# Patient Record
Sex: MALE | Race: OTHER | HISPANIC OR LATINO | URBAN - METROPOLITAN AREA
[De-identification: names, ages, dates, MRNs, and addresses within clinical notes are randomized per-mention and may not be internally consistent; named-entity substitution may affect disease eponyms.]

---

## 2020-02-24 ENCOUNTER — OUTPATIENT (OUTPATIENT)
Dept: OUTPATIENT SERVICES | Facility: HOSPITAL | Age: 50
LOS: 1 days | End: 2020-02-24
Payer: MEDICARE

## 2020-02-24 ENCOUNTER — TRANSCRIPTION ENCOUNTER (OUTPATIENT)
Age: 50
End: 2020-02-24

## 2020-02-24 VITALS
SYSTOLIC BLOOD PRESSURE: 150 MMHG | DIASTOLIC BLOOD PRESSURE: 102 MMHG | RESPIRATION RATE: 16 BRPM | HEART RATE: 83 BPM | HEIGHT: 71 IN | OXYGEN SATURATION: 98 % | WEIGHT: 216.93 LBS | TEMPERATURE: 98 F

## 2020-02-24 DIAGNOSIS — Z98.890 OTHER SPECIFIED POSTPROCEDURAL STATES: Chronic | ICD-10-CM

## 2020-02-24 DIAGNOSIS — N18.6 END STAGE RENAL DISEASE: ICD-10-CM

## 2020-02-24 DIAGNOSIS — E87.5 HYPERKALEMIA: ICD-10-CM

## 2020-02-24 DIAGNOSIS — Z01.818 ENCOUNTER FOR OTHER PREPROCEDURAL EXAMINATION: ICD-10-CM

## 2020-02-24 DIAGNOSIS — E11.9 TYPE 2 DIABETES MELLITUS WITHOUT COMPLICATIONS: ICD-10-CM

## 2020-02-24 DIAGNOSIS — I10 ESSENTIAL (PRIMARY) HYPERTENSION: ICD-10-CM

## 2020-02-24 PROCEDURE — G0463: CPT

## 2020-02-24 RX ORDER — POVIDONE-IODINE 5 %
1 AEROSOL (ML) TOPICAL ONCE
Refills: 0 | Status: COMPLETED | OUTPATIENT
Start: 2020-02-25 | End: 2020-02-25

## 2020-02-24 NOTE — H&P PST ADULT - NEGATIVE GENERAL GENITOURINARY SYMPTOMS
no dysuria/no flank pain L/end stage renal disease, pt on HD via righ chest permacath from 2/2019/no hematuria/no renal colic/no flank pain R/no urinary hesitancy/no nocturia/no urine discoloration/no gas in urine/no incontinence/normal urinary frequency

## 2020-02-24 NOTE — H&P PST ADULT - NEGATIVE OPHTHALMOLOGIC SYMPTOMS
no lacrimation L/no photophobia/no diplopia/no blurred vision L/no lacrimation R/no blurred vision R

## 2020-02-24 NOTE — H&P PST ADULT - HISTORY OF PRESENT ILLNESS
49 years old male presented to PST for evaluation before surgery, pt was diagnosed with end stage renal disease and is scheduled for left arm arteriovenous creation on 2/25/2020.

## 2020-02-24 NOTE — H&P PST ADULT - VENOUS THROMBOEMBOLISM CURRENT STATUS
(2) central venous access/(1) other risk factor (includes escalating BMI, pack-years of smoking, diabetes requiring insulin, chemotherapy, female gender and length of surgery)

## 2020-02-24 NOTE — H&P PST ADULT - GASTROINTESTINAL DETAILS
soft/no distention/no masses palpable/nontender/no rebound tenderness/bowel sounds normal/no bruit/normal

## 2020-02-24 NOTE — H&P PST ADULT - NEGATIVE GASTROINTESTINAL SYMPTOMS
no change in bowel habits/no flatulence/no abdominal pain/no nausea/no diarrhea/no constipation/no vomiting

## 2020-02-24 NOTE — H&P PST ADULT - NSICDXFAMILYHX_GEN_ALL_CORE_FT
FAMILY HISTORY:  FH: hypertension, mother, father- alive  FH: type 2 diabetes, mother, father- alive

## 2020-02-24 NOTE — H&P PST ADULT - NEGATIVE NEUROLOGICAL SYMPTOMS
no vertigo/no loss of sensation/no difficulty walking/no confusion/no facial palsy/no syncope/no tremors/no loss of consciousness/no hemiparesis/no paresthesias/no transient paralysis/no weakness/no generalized seizures/no focal seizures/no headache

## 2020-02-24 NOTE — H&P PST ADULT - MUSCULOSKELETAL
details… detailed exam no joint warmth/no calf tenderness/decreased ROM/no joint erythema/no joint swelling

## 2020-02-24 NOTE — H&P PST ADULT - NSICDXPASTMEDICALHX_GEN_ALL_CORE_FT
PAST MEDICAL HISTORY:  Anemia     Bone spur right shoulder- hx of - sx done    End stage renal disease started HD 2/2019 T, Th, Sat via right chest permacath    History of hyperkalemia before HD- K-6.2 - to repeat in am of sx, pt had HD today    Hypertension     Injury of right wrist hx of at age 14    Type 2 diabetes mellitus

## 2020-02-24 NOTE — H&P PST ADULT - DOES PATIENT HAVE ADVANCE DIRECTIVE
No/in case of emergency call patient 's girl friend Sofie [phone] to make medical decisions , HCP form given to pt

## 2020-02-24 NOTE — H&P PST ADULT - NEGATIVE CARDIOVASCULAR SYMPTOMS
no paroxysmal nocturnal dyspnea/no claudication/no palpitations/no chest pain/no dyspnea on exertion/no orthopnea/no peripheral edema

## 2020-02-24 NOTE — H&P PST ADULT - NSICDXPROBLEM_GEN_ALL_CORE_FT
PROBLEM DIAGNOSES  Problem: Hyperkalemia  Assessment and Plan: K- before HD- K-6.2 - to repeat in am of sx, pt had HD today- Anesthesia aware    Problem: Hypertension  Assessment and Plan: Continue antihypertensive meds and take with sips of water on day of surgery.  Cleared by PCP. Follow-up with PCP postop for management.     Problem: Type 2 diabetes mellitus  Assessment and Plan: Continue antidiabetic meds and hold on day of surgery. Perioperative glucose monitoring and cover as needed. Follow-up with PCP for diabetic management     Problem: End stage renal disease  Assessment and Plan: Patient  is scheduled for left arm arteriovenous creation on 2/25/2020.

## 2020-02-24 NOTE — H&P PST ADULT - NSICDXPASTSURGICALHX_GEN_ALL_CORE_FT
PAST SURGICAL HISTORY:  H/O right wrist surgery tendons repair - at age 14    History of vascular access device right chest permacath - 2/2019    S/P arthroscopy of right shoulder 2010

## 2020-02-25 ENCOUNTER — OUTPATIENT (OUTPATIENT)
Dept: OUTPATIENT SERVICES | Facility: HOSPITAL | Age: 50
LOS: 1 days | End: 2020-02-25
Payer: MEDICARE

## 2020-02-25 VITALS
TEMPERATURE: 99 F | WEIGHT: 216.93 LBS | HEIGHT: 71 IN | DIASTOLIC BLOOD PRESSURE: 89 MMHG | SYSTOLIC BLOOD PRESSURE: 159 MMHG | RESPIRATION RATE: 16 BRPM | OXYGEN SATURATION: 100 % | HEART RATE: 88 BPM

## 2020-02-25 VITALS
OXYGEN SATURATION: 98 % | HEART RATE: 82 BPM | TEMPERATURE: 98 F | SYSTOLIC BLOOD PRESSURE: 126 MMHG | DIASTOLIC BLOOD PRESSURE: 72 MMHG | RESPIRATION RATE: 16 BRPM

## 2020-02-25 DIAGNOSIS — N18.6 END STAGE RENAL DISEASE: ICD-10-CM

## 2020-02-25 DIAGNOSIS — Z98.890 OTHER SPECIFIED POSTPROCEDURAL STATES: Chronic | ICD-10-CM

## 2020-02-25 LAB
ANION GAP SERPL CALC-SCNC: 9 MMOL/L — SIGNIFICANT CHANGE UP (ref 5–17)
BUN SERPL-MCNC: 48 MG/DL — HIGH (ref 7–18)
CALCIUM SERPL-MCNC: 8.9 MG/DL — SIGNIFICANT CHANGE UP (ref 8.4–10.5)
CHLORIDE SERPL-SCNC: 105 MMOL/L — SIGNIFICANT CHANGE UP (ref 96–108)
CO2 SERPL-SCNC: 24 MMOL/L — SIGNIFICANT CHANGE UP (ref 22–31)
CREAT SERPL-MCNC: 7.61 MG/DL — HIGH (ref 0.5–1.3)
GLUCOSE BLDC GLUCOMTR-MCNC: 116 MG/DL — HIGH (ref 70–99)
GLUCOSE BLDC GLUCOMTR-MCNC: 91 MG/DL — SIGNIFICANT CHANGE UP (ref 70–99)
GLUCOSE SERPL-MCNC: 94 MG/DL — SIGNIFICANT CHANGE UP (ref 70–99)
POTASSIUM SERPL-MCNC: 5.1 MMOL/L — SIGNIFICANT CHANGE UP (ref 3.5–5.3)
POTASSIUM SERPL-MCNC: 7.2 MMOL/L — CRITICAL HIGH (ref 3.5–5.3)
POTASSIUM SERPL-SCNC: 5.1 MMOL/L — SIGNIFICANT CHANGE UP (ref 3.5–5.3)
POTASSIUM SERPL-SCNC: 7.2 MMOL/L — CRITICAL HIGH (ref 3.5–5.3)
SODIUM SERPL-SCNC: 138 MMOL/L — SIGNIFICANT CHANGE UP (ref 135–145)

## 2020-02-25 PROCEDURE — 80048 BASIC METABOLIC PNL TOTAL CA: CPT

## 2020-02-25 PROCEDURE — 36819 AV FUSE UPPR ARM BASILIC: CPT

## 2020-02-25 PROCEDURE — 82962 GLUCOSE BLOOD TEST: CPT

## 2020-02-25 PROCEDURE — 36415 COLL VENOUS BLD VENIPUNCTURE: CPT

## 2020-02-25 PROCEDURE — 84132 ASSAY OF SERUM POTASSIUM: CPT

## 2020-02-25 RX ORDER — CHLORHEXIDINE GLUCONATE 213 G/1000ML
1 SOLUTION TOPICAL ONCE
Refills: 0 | Status: COMPLETED | OUTPATIENT
Start: 2020-02-25 | End: 2020-02-25

## 2020-02-25 RX ORDER — TRAMADOL HYDROCHLORIDE 50 MG/1
1 TABLET ORAL
Qty: 20 | Refills: 0
Start: 2020-02-25 | End: 2020-02-29

## 2020-02-25 RX ORDER — SODIUM CHLORIDE 9 MG/ML
1000 INJECTION INTRAMUSCULAR; INTRAVENOUS; SUBCUTANEOUS
Refills: 0 | Status: DISCONTINUED | OUTPATIENT
Start: 2020-02-25 | End: 2020-02-25

## 2020-02-25 RX ORDER — ONDANSETRON 8 MG/1
4 TABLET, FILM COATED ORAL ONCE
Refills: 0 | Status: DISCONTINUED | OUTPATIENT
Start: 2020-02-25 | End: 2020-02-25

## 2020-02-25 RX ORDER — SODIUM CHLORIDE 9 MG/ML
3 INJECTION INTRAMUSCULAR; INTRAVENOUS; SUBCUTANEOUS EVERY 8 HOURS
Refills: 0 | Status: DISCONTINUED | OUTPATIENT
Start: 2020-02-25 | End: 2020-02-25

## 2020-02-25 RX ORDER — HYDROMORPHONE HYDROCHLORIDE 2 MG/ML
0.5 INJECTION INTRAMUSCULAR; INTRAVENOUS; SUBCUTANEOUS
Refills: 0 | Status: DISCONTINUED | OUTPATIENT
Start: 2020-02-25 | End: 2020-02-25

## 2020-02-25 RX ORDER — TRAMADOL HYDROCHLORIDE 50 MG/1
1 TABLET ORAL
Qty: 10 | Refills: 0
Start: 2020-02-25 | End: 2020-02-28

## 2020-02-25 RX ORDER — FENTANYL CITRATE 50 UG/ML
25 INJECTION INTRAVENOUS
Refills: 0 | Status: DISCONTINUED | OUTPATIENT
Start: 2020-02-25 | End: 2020-02-25

## 2020-02-25 RX ORDER — TRAMADOL HYDROCHLORIDE 50 MG/1
50 TABLET ORAL ONCE
Refills: 0 | Status: DISCONTINUED | OUTPATIENT
Start: 2020-02-25 | End: 2020-02-25

## 2020-02-25 RX ADMIN — Medication 1 APPLICATION(S): at 07:11

## 2020-02-25 RX ADMIN — CHLORHEXIDINE GLUCONATE 1 APPLICATION(S): 213 SOLUTION TOPICAL at 07:08

## 2020-02-25 RX ADMIN — SODIUM CHLORIDE 3 MILLILITER(S): 9 INJECTION INTRAMUSCULAR; INTRAVENOUS; SUBCUTANEOUS at 07:07

## 2020-02-25 NOTE — BRIEF OPERATIVE NOTE - OPERATION/FINDINGS
preop dx: ESRD on HD via right permacath  postop dx: same  procedure: left brachiobasilic arteriovenous fistula creation with venous transposition

## 2020-02-25 NOTE — ASU DISCHARGE PLAN (ADULT/PEDIATRIC) - NURSING INSTRUCTIONS
KEEP DRESSING DRY UNTIL SEEN BY DR Rubio KEEP DRESSING DRY UNTIL SEEN BY DR Rubio  How to empty the Lewis-Cardona drain. EMPTY the bulb when it is half full or every 8 to 12 hours. WASH your hands with soap and water. REMOVE the plug from the bulb. POUR the fluid into a measuring cup. CLEAN the plug with an alcohol swab or a cotton ball dipped in rubbing alcohol. SQUEEZE the bulb flat and put the plug back in. The bulb should stay flat until it starts to fill with fluid again. MEASURE the amount of fluid you pour out. WRITE down how much fluid you empty from the PITA drain and the date and time you collected it. Flush the fluid down the toilet. Wash your hands.

## 2020-02-25 NOTE — ASU DISCHARGE PLAN (ADULT/PEDIATRIC) - ASU DC SPECIAL INSTRUCTIONSFT
Follow up with Dr Rubio on Thursday. Call office for an appointment Follow up with Dr Rubio on Thursday. Call office for an appointment  empty  drain, record amount, twice daily, bring list with you to the doctor at the F/U appointment

## 2020-02-25 NOTE — ASU PATIENT PROFILE, ADULT - DOES PATIENT HAVE ADVANCE DIRECTIVE
in case of emergency call patient 's girl friend Sofie [phone] to make medical decisions , HCP form given to pt/No

## 2020-02-25 NOTE — BRIEF OPERATIVE NOTE - NSICDXBRIEFPROCEDURE_GEN_ALL_CORE_FT
PROCEDURES:  Creation, AV fistula, brachiobasilic, using transposition technique 25-Feb-2020 12:02:35  Fili Bennett

## 2021-10-23 NOTE — ASU PATIENT PROFILE, ADULT - NS PRO TALK SOMEONE YN
CV Surgery: ECMO Daily Management    Date: 10/23/2021    Reason for Visit: Management of temporary circulatory support    ECMO information: VV ECMO 9/25/21    ECMO indication: Acute respiratory collapse     Cannulation: 31 Fr Protek-Duo R IJ     ECMO Results   Internal Changes:               ECMO Pump Type Centrifugal Pump              ECMO Sweep Flow 4 L/min             ECMO Sweep Flow FiO2 1             ECMO Pump Flow 3.4 L/min             ECMO Pump Flow - RPM @ 4,000 RPMs             ECMO Preoxygenator Pressure 230 mm Hg     No line chatter.    Anticoagulation: No current anticoagulation.  INR 1.7  PTT 33    Oxygen: Nasal cannula 4 L/min      Current Status: Patient is stable with adequate flows on VV ECMO. Seen sitting upright in a bedside chair.     Current Infusions:  No current gtts      Vitals, labs, and imaging documented over the past 24 hours have been independently reviewed.    Assessment    Patient is a 34 year old male with COVID-19 PNA and acute respiratory failure requiring VV ECMO.    Plan    -Correction of hypofibrinogenemia.   -Epistaxis again today (10/23), if tolerating diet, will d/c Dobhoff tube.  -Continue ECMO speeds at 4,000 RPMs.     Charting performed by yunior Walker for Dr. Ferguson.     All medical record entries made by the raysaibgerald were at my direction. I have reviewed the chart and agree that the record accurately reflects my personal performance of the history, physical exam, hospital course, and assessment and plan.   no

## 2021-12-14 ENCOUNTER — EMERGENCY (EMERGENCY)
Facility: HOSPITAL | Age: 51
LOS: 1 days | Discharge: HOME | End: 2021-12-14
Attending: EMERGENCY MEDICINE | Admitting: EMERGENCY MEDICINE
Payer: MEDICARE

## 2021-12-14 VITALS
DIASTOLIC BLOOD PRESSURE: 85 MMHG | OXYGEN SATURATION: 98 % | HEART RATE: 85 BPM | RESPIRATION RATE: 18 BRPM | TEMPERATURE: 98 F | SYSTOLIC BLOOD PRESSURE: 160 MMHG

## 2021-12-14 DIAGNOSIS — Z98.890 OTHER SPECIFIED POSTPROCEDURAL STATES: Chronic | ICD-10-CM

## 2021-12-14 LAB
ALBUMIN SERPL ELPH-MCNC: 3.4 G/DL — SIGNIFICANT CHANGE UP (ref 3.3–5)
ALP SERPL-CCNC: 92 U/L — SIGNIFICANT CHANGE UP (ref 40–120)
ALT FLD-CCNC: 12 U/L — SIGNIFICANT CHANGE UP (ref 4–41)
ANION GAP SERPL CALC-SCNC: 12 MMOL/L — SIGNIFICANT CHANGE UP (ref 7–14)
AST SERPL-CCNC: 25 U/L — SIGNIFICANT CHANGE UP (ref 4–40)
BASOPHILS # BLD AUTO: 0.05 K/UL — SIGNIFICANT CHANGE UP (ref 0–0.2)
BASOPHILS NFR BLD AUTO: 0.7 % — SIGNIFICANT CHANGE UP (ref 0–2)
BILIRUB SERPL-MCNC: 0.4 MG/DL — SIGNIFICANT CHANGE UP (ref 0.2–1.2)
BUN SERPL-MCNC: 42 MG/DL — HIGH (ref 7–23)
CALCIUM SERPL-MCNC: 8.4 MG/DL — SIGNIFICANT CHANGE UP (ref 8.4–10.5)
CHLORIDE SERPL-SCNC: 97 MMOL/L — LOW (ref 98–107)
CO2 SERPL-SCNC: 27 MMOL/L — SIGNIFICANT CHANGE UP (ref 22–31)
CREAT SERPL-MCNC: 5.82 MG/DL — HIGH (ref 0.5–1.3)
EOSINOPHIL # BLD AUTO: 0.63 K/UL — HIGH (ref 0–0.5)
EOSINOPHIL NFR BLD AUTO: 8.3 % — HIGH (ref 0–6)
GLUCOSE SERPL-MCNC: 121 MG/DL — HIGH (ref 70–99)
HCT VFR BLD CALC: 35.1 % — LOW (ref 39–50)
HGB BLD-MCNC: 11.3 G/DL — LOW (ref 13–17)
IANC: 5.15 K/UL — SIGNIFICANT CHANGE UP (ref 1.5–8.5)
IMM GRANULOCYTES NFR BLD AUTO: 0.3 % — SIGNIFICANT CHANGE UP (ref 0–1.5)
LYMPHOCYTES # BLD AUTO: 0.89 K/UL — LOW (ref 1–3.3)
LYMPHOCYTES # BLD AUTO: 11.7 % — LOW (ref 13–44)
MCHC RBC-ENTMCNC: 32.2 GM/DL — SIGNIFICANT CHANGE UP (ref 32–36)
MCHC RBC-ENTMCNC: 34 PG — SIGNIFICANT CHANGE UP (ref 27–34)
MCV RBC AUTO: 105.7 FL — HIGH (ref 80–100)
MONOCYTES # BLD AUTO: 0.89 K/UL — SIGNIFICANT CHANGE UP (ref 0–0.9)
MONOCYTES NFR BLD AUTO: 11.7 % — SIGNIFICANT CHANGE UP (ref 2–14)
NEUTROPHILS # BLD AUTO: 5.15 K/UL — SIGNIFICANT CHANGE UP (ref 1.8–7.4)
NEUTROPHILS NFR BLD AUTO: 67.3 % — SIGNIFICANT CHANGE UP (ref 43–77)
NRBC # BLD: 0 /100 WBCS — SIGNIFICANT CHANGE UP
NRBC # FLD: 0 K/UL — SIGNIFICANT CHANGE UP
PLATELET # BLD AUTO: 199 K/UL — SIGNIFICANT CHANGE UP (ref 150–400)
POTASSIUM SERPL-MCNC: 5.3 MMOL/L — SIGNIFICANT CHANGE UP (ref 3.5–5.3)
POTASSIUM SERPL-MCNC: 5.6 MMOL/L — HIGH (ref 3.5–5.3)
POTASSIUM SERPL-MCNC: 5.6 MMOL/L — HIGH (ref 3.5–5.3)
POTASSIUM SERPL-SCNC: 5.3 MMOL/L — SIGNIFICANT CHANGE UP (ref 3.5–5.3)
POTASSIUM SERPL-SCNC: 5.6 MMOL/L — HIGH (ref 3.5–5.3)
POTASSIUM SERPL-SCNC: 5.6 MMOL/L — HIGH (ref 3.5–5.3)
PROT SERPL-MCNC: 6.3 G/DL — SIGNIFICANT CHANGE UP (ref 6–8.3)
RBC # BLD: 3.32 M/UL — LOW (ref 4.2–5.8)
RBC # FLD: 14.2 % — SIGNIFICANT CHANGE UP (ref 10.3–14.5)
SODIUM SERPL-SCNC: 136 MMOL/L — SIGNIFICANT CHANGE UP (ref 135–145)
WBC # BLD: 7.63 K/UL — SIGNIFICANT CHANGE UP (ref 3.8–10.5)
WBC # FLD AUTO: 7.63 K/UL — SIGNIFICANT CHANGE UP (ref 3.8–10.5)

## 2021-12-14 PROCEDURE — G1004: CPT

## 2021-12-14 PROCEDURE — 99284 EMERGENCY DEPT VISIT MOD MDM: CPT | Mod: 25,GC

## 2021-12-14 PROCEDURE — 73070 X-RAY EXAM OF ELBOW: CPT | Mod: 26,RT

## 2021-12-14 PROCEDURE — 12002 RPR S/N/AX/GEN/TRNK2.6-7.5CM: CPT

## 2021-12-14 PROCEDURE — 73090 X-RAY EXAM OF FOREARM: CPT | Mod: 26,RT

## 2021-12-14 PROCEDURE — 70450 CT HEAD/BRAIN W/O DYE: CPT | Mod: 26,MG

## 2021-12-14 PROCEDURE — 93010 ELECTROCARDIOGRAM REPORT: CPT | Mod: 59

## 2021-12-14 RX ORDER — TETANUS TOXOID, REDUCED DIPHTHERIA TOXOID AND ACELLULAR PERTUSSIS VACCINE, ADSORBED 5; 2.5; 8; 8; 2.5 [IU]/.5ML; [IU]/.5ML; UG/.5ML; UG/.5ML; UG/.5ML
0.5 SUSPENSION INTRAMUSCULAR ONCE
Refills: 0 | Status: COMPLETED | OUTPATIENT
Start: 2021-12-14 | End: 2021-12-14

## 2021-12-14 RX ADMIN — TETANUS TOXOID, REDUCED DIPHTHERIA TOXOID AND ACELLULAR PERTUSSIS VACCINE, ADSORBED 0.5 MILLILITER(S): 5; 2.5; 8; 8; 2.5 SUSPENSION INTRAMUSCULAR at 19:18

## 2021-12-14 NOTE — ED ADULT NURSE NOTE - OBJECTIVE STATEMENT
pt received to 3b, aox4.  pt c/o "I syncopized today while I was getting peritoneal dialysis".  pt reports + LOC.  + lac to back of head.  bleeding controlled.  SL laced, labs sent.  amadou martin

## 2021-12-14 NOTE — ED PROVIDER NOTE - CLINICAL SUMMARY MEDICAL DECISION MAKING FREE TEXT BOX
50 yo M with hx of ESRD on HD last session yesterday, cirrhosis, CHF presents with syncope after undergoing paracentesis (12.5) at 3pm today. VS WNL; normotensive. R sided occipital laceration 5cm. R elbow ttp, neurovascularly intact. Syncope likely secondary to transient hypotension secondary to paracentesis. Will check electrolytes, albumin, head CT, elbow XR, laceration repair, reassess.

## 2021-12-14 NOTE — ED PROVIDER NOTE - CARE PLAN
Principal Discharge DX:	Syncope, vasovagal  Secondary Diagnosis:	Laceration of scalp  Secondary Diagnosis:	Contusion of head, initial encounter   1 Principal Discharge DX:	Traumatic subdural hemorrhage  Secondary Diagnosis:	Laceration of scalp  Secondary Diagnosis:	Contusion of head, initial encounter  Secondary Diagnosis:	Vasovagal syncope

## 2021-12-14 NOTE — ED PROVIDER NOTE - OBJECTIVE STATEMENT
50 yo M with hx of ESRD on HD last session yesterday, cirrhosis, CHF presents with syncope after undergoing paracentesis (12.5) at 3pm today. Reports standing after paracentesis, feeling lightheaded and passing out for a few seconds, hitting the back of his head. Denies chest pain, sob or palpitations. No abd pain or fever.

## 2021-12-14 NOTE — ED PROVIDER NOTE - CARE PROVIDER_API CALL
Clifford Alvarez (MD; ANUSHA; MS)  Neurosurgery  [address], [address]  [place] [address]  Phone: [phone]  Fax: [phone]  Established Patient  Follow Up Time: Routine

## 2021-12-14 NOTE — ED ADULT TRIAGE NOTE - CHIEF COMPLAINT QUOTE
p/t with ESRD on HD and  liver cirrhosis had a syncopal episode after paracentesis procedure this afternoon,, 12.5 l was removed, p/t is awake and responsive, denies any chest pain, no neuro deficits noted, lac to back of head noted

## 2021-12-14 NOTE — ED PROVIDER NOTE - NSFOLLOWUPINSTRUCTIONS_ED_ALL_ED_FT
You were seen for evaluation after a fall.    Your head CT showed a small bleed.  We repeated the scan after a 4-hour interval and made sure there was no worsening of the bleed.    You were evaluated by the Neurosurgery doctors and deemed stable for discharge.  Please follow-up with Dr. Rc Alvarez in two weeks time.  Call the number above to schedule an appointment.    I am sending a medication to your pharmacy for seizure prophylaxis at the recommendation of the neurosurgeon.  Please take this as indicated on the pill bottle.    If you have any concerning symptoms, seek immediate medical attention.      Subdural Hematoma       A subdural hematoma is a collection of blood between the brain and its outer covering (dura). As the amount of blood increases, pressure builds on the brain.  There are two types of subdural hematomas:  •Acute. This type develops shortly after a hard, direct hit to the head and causes blood to collect very quickly. This is a medical emergency. If it is not diagnosed and treated quickly, it can lead to severe brain injury or death.      •Chronic. This is when bleeding develops more slowly, over weeks or months. In some cases, this type does not cause symptoms.        What are the causes?    This condition is caused by bleeding (hemorrhage) from a broken (ruptured) blood vessel. In most cases, a blood vessel ruptures and bleeds because of a head injury, such as from a hard, direct hit. Head injuries can happen in car accidents, falls, assaults, or while playing sports.    In rare cases, a hemorrhage can happen without a known cause (spontaneously), especially if you take blood thinners (anticoagulants).      What increases the risk?  This condition is more likely to develop in:  •Older people.      •Infants.      •People who take blood thinners.      •People who have head injuries.      •People who abuse alcohol.        What are the signs or symptoms?  Symptoms of this condition can vary depending on the size of the hematoma. Symptoms can be mild, severe, or life-threatening. They include:  •Headaches.      •Nausea or vomiting.      •Changes in vision, such as double vision or loss of vision.      •Changes in speech or trouble understanding what people say.      •Loss of balance or trouble walking.      •Weakness, numbness, or tingling in the arms or legs, especially on one side of the body.      •Seizures.      •Change in personality.      •Increased sleepiness.      •Memory loss.      •Loss of consciousness.      •Coma.      Symptoms of acute subdural hematoma can develop over minutes or hours. Symptoms of chronic subdural hematoma may develop over weeks or months.      How is this diagnosed?  This condition is diagnosed based on the results of:  •A physical exam.       •Tests of strength, reflexes, coordination, senses, manner of walking (gait), and facial and eye movements (neurological exam).       •Imaging tests, such as an MRI or a CT scan.        How is this treated?    Treatment for this condition depends on the type of hematoma and how severe it is.  Treatment for acute hematoma may include:  •Emergency surgery to drain blood or remove a blood clot.      •Medicines that help the body get rid of excess fluids (diuretics). These may help to reduce pressure in the brain.      •Assisted breathing (ventilation).    Treatment for chronic hematoma may include:  •Observation and bed rest at the hospital.      •Surgery.      If you take blood thinners, you may need to stop taking them for a short time. You may also be given anti-seizure (anticonvulsant) medicine.    Sometimes, no treatment is needed for chronic subdural hematoma.      Follow these instructions at home:    Activity   •Avoid situations where you could injure your head again, such as in competitive sports, downhill snow sports, and horseback riding. Do not do these activities until your health care provider approves.  •Wear protective gear, such as a helmet, when participating in activities such as biking or contact sports.        •Avoid too much visual stimulation while recovering. This means limiting how much you read and limiting your screen time on a smart phone, tablet, computer, or TV.      •Rest as told by your health care provider. Rest helps the brain heal.      •Try to avoid activities that cause physical or mental stress. Return to work or school as told by your health care provider.      • Do not lift anything that is heavier than 5 lb (2.3 kg), or the limit you are told, until your health care provider says that it is safe.      • Do not drive, ride a bike, or use heavy machinery until your health care provider approves.      •Always wear your seat belt when you are in a motor vehicle.      Alcohol use     • Do not drink alcohol if your health care provider tells you not to drink.    •If you drink alcohol, limit how much you use to:  •0–1 drink a day for women.      •0–2 drinks a day for men.        General instructions     •Monitor your symptoms, and ask people around you to do the same. Recovery from brain injuries varies. Talk with your health care provider about what to expect.      •Take over-the-counter and prescription medicines only as told by your health care provider. Do not take blood thinners or NSAIDs unless your health care provider approves. These include aspirin, ibuprofen, naproxen, and warfarin.      •Keep your home environment safe to reduce the risk of falling.      •Keep all follow-up visits as told by your health care provider. This is important.        Where to find more information    •National [place] of Neurological Disorders and Stroke: www.ninds.nih.gov      •American Academy of Neurology (AAN): www.aan.com      •Brain Injury Association of [place]: www.biausa.org        Get help right away if you:    •Are taking blood thinners and you fall or you experience minor trauma to the head. If you take any blood thinners, even a very small injury can cause a subdural hematoma.      •Have a bleeding disorder and you fall or you experience minor trauma to the head.    •Develop any of the following symptoms after a head injury:  •Clear fluid draining from your nose or ears.      •Nausea or vomiting.      •Changes in speech or trouble understanding what people say.      •Seizures.      •Drowsiness or a decrease in alertness.      •Double vision.      •Numbness or inability to move (paralysis) in any part of your body.      •Difficulty walking or poor coordination.      •Difficulty thinking.      •Confusion or forgetfulness.      •Personality changes.      •Irrational or aggressive behavior.        These symptoms may represent a serious problem that is an emergency. Do not wait to see if the symptoms will go away. Get medical help right away. Call your local emergency services (911 in the [place] Do not drive yourself to the hospital.       Summary    •A subdural hematoma is a collection of blood between the brain and its outer covering (dura).      •Treatment for this condition depends on what type of subdural hematoma you have and how severe it is.      •Symptoms can vary from mild to severe to life-threatening.      •Monitor your symptoms, and ask others around you to do the same.      This information is not intended to replace advice given to you by your health care provider. Make sure you discuss any questions you have with your health care provider.

## 2021-12-14 NOTE — ED PROVIDER NOTE - NS ED ROS FT
GENERAL: No fever or chills  EYES: no change in vision  HEENT: Head trauma.   CARDIAC: no chest pain or palpitations   PULMONARY: no cough or SOB  GI: no abdominal pain, nausea, vomiting, diarrhea, or constipation   : No changes in urination  SKIN: no rashes  NEURO: no headache, numbness, or weakness.  MSK: see hpi

## 2021-12-14 NOTE — ED PROVIDER NOTE - SHIFT CHANGE DETAILS
51M p/w syncope and fall after ~12L peritoneal fluid was taken out earlier today during a therapeutic paracentesis.  Two sets of K were hemolyzed, pending a repeat.  Will reassess and dispo pending results.

## 2021-12-14 NOTE — ED PROVIDER NOTE - ATTENDING CONTRIBUTION TO CARE
MD Dukes:  patient seen and evaluated with the resident.  I was present for key portions of the History and Physical, and I agree with the Impression and Plan.    Patient is a 50 yo M, MHx of ESRD on HD as well as cirrhosis, who experienced an LOC after large-volume paracentesis.   Onset:  gradually with antecedent light-headedness and tunnel-vision.    Context:  patient had just finished large volume paracentesis (12L removed) and was in the process of being discharged from the facility where procedure had occurred.  "Usually after they take off a lot of fluid I go sleep in my car for an hour, but I didn't make it to the car."   Quality:  full LOC.  Duration < 5 sec.  Associated Sx:  No CP/SOB, no palpitations, back pain.  No weakness/numbness, no abdominal pain, & no fever/chills.    VS: wnl.  Physical Exam: adult M, NAD,   5-6cm occipital scalp laceration, no midline c-spine TTP, PERRL, EOMI, neck supple, CTA B, RRR, Abd: s/nd/nt, Ext: no edema.  Neuro:  AAOx3, moving all 4 extremities equally, normal gait.     ECG:  QTc 508 w/o comparison, no ST-elev/depr.  Impression/Plan: H&P at this time most consistent with vasovagal syncope due to intravascular depletion, in turn due to large-volume paracentesis.    Suspicion for ACS/AMI low, given appearance of patient in the ED, VS, lab values, and modifiers.  H&P inconsistent with myocarditis, pulmonary embolism, pneumothorax, pneumonia, or arrhythmia at this time.  History not abrupt in onset, tearing or ripping; pulses symmetric; no evidence of aortic dissection.  Neither CTA chest, nor ddimer indicated at this time.  While his ECG does show a QTc of 508, that patient's HPI is very consistent with intravascular volume depletion as the cause of his syncopal episode, as opposed to malignant arrhythmia.

## 2021-12-14 NOTE — ED PROVIDER NOTE - PATIENT PORTAL LINK FT
You can access the FollowMyHealth Patient Portal offered by [facility] by registering at the following website: [email or web address]. By joining [facility]’s FollowMyHealth portal, you will also be able to view your health information using other applications (apps) compatible with our system.

## 2021-12-14 NOTE — ED PROVIDER NOTE - PROGRESS NOTE DETAILS
Brandon PGY3 - Received critical notification from radiology resident stating pt. has a small subdural hematoma along the falx.  NSx paged. Brandon PGY3 - Spoke w/ neurosurgery who said if interval scan is unremarkable, pt. can be dc/d w/ two week f/u and keppra 500mg BID for two weeks. Brandon PGY3 - Interval scan unremarkable.  Keppra rx sent.  Will dc w/ neurosurg f/u and return precautions.  All other questions and concerns have been addressed.

## 2021-12-15 VITALS
SYSTOLIC BLOOD PRESSURE: 158 MMHG | RESPIRATION RATE: 16 BRPM | TEMPERATURE: 97 F | OXYGEN SATURATION: 99 % | DIASTOLIC BLOOD PRESSURE: 77 MMHG | HEART RATE: 70 BPM

## 2021-12-15 PROBLEM — Z86.39 PERSONAL HISTORY OF OTHER ENDOCRINE, NUTRITIONAL AND METABOLIC DISEASE: Chronic | Status: ACTIVE | Noted: 2020-02-24

## 2021-12-15 PROBLEM — N18.6 END STAGE RENAL DISEASE: Chronic | Status: ACTIVE | Noted: 2020-02-24

## 2021-12-15 PROBLEM — M77.9 ENTHESOPATHY, UNSPECIFIED: Chronic | Status: ACTIVE | Noted: 2020-02-24

## 2021-12-15 PROBLEM — E11.9 TYPE 2 DIABETES MELLITUS WITHOUT COMPLICATIONS: Chronic | Status: ACTIVE | Noted: 2020-02-24

## 2021-12-15 PROBLEM — D64.9 ANEMIA, UNSPECIFIED: Chronic | Status: ACTIVE | Noted: 2020-02-24

## 2021-12-15 PROBLEM — S69.91XA UNSPECIFIED INJURY OF RIGHT WRIST, HAND AND FINGER(S), INITIAL ENCOUNTER: Chronic | Status: ACTIVE | Noted: 2020-02-24

## 2021-12-15 PROBLEM — I10 ESSENTIAL (PRIMARY) HYPERTENSION: Chronic | Status: ACTIVE | Noted: 2020-02-24

## 2021-12-15 LAB
APTT BLD: 31.5 SEC — SIGNIFICANT CHANGE UP (ref 27–36.3)
INR BLD: 1.27 RATIO — HIGH (ref 0.88–1.16)
PROTHROM AB SERPL-ACNC: 14.3 SEC — HIGH (ref 10.6–13.6)
SARS-COV-2 RNA SPEC QL NAA+PROBE: SIGNIFICANT CHANGE UP

## 2021-12-15 PROCEDURE — 70450 CT HEAD/BRAIN W/O DYE: CPT | Mod: 26,MA

## 2021-12-15 PROCEDURE — 99282 EMERGENCY DEPT VISIT SF MDM: CPT

## 2021-12-15 RX ORDER — LEVETIRACETAM 250 MG/1
1 TABLET, FILM COATED ORAL
Qty: 28 | Refills: 0
Start: 2021-12-15 | End: 2021-12-28

## 2021-12-15 RX ORDER — LEVETIRACETAM 250 MG/1
1000 TABLET, FILM COATED ORAL ONCE
Refills: 0 | Status: COMPLETED | OUTPATIENT
Start: 2021-12-15 | End: 2021-12-15

## 2021-12-15 RX ADMIN — LEVETIRACETAM 400 MILLIGRAM(S): 250 TABLET, FILM COATED ORAL at 03:58

## 2021-12-15 NOTE — CONSULT NOTE ADULT - ASSESSMENT
51M s/p syncope fall found to have small falx SDH    recc:  - no acute neurosurgical intervention at this time  - rpt CTH 4-6h for stability  - keppra for seizure ppx

## 2021-12-15 NOTE — CONSULT NOTE ADULT - SUBJECTIVE AND OBJECTIVE BOX
NEUROSURGERY CONSULT    HPI: 51y Male pmhx ESRD on HD, CHF, Cirhosis presents s/p fall hitting head after syncope. CTh with small falx sdh, posterior scalp laceration repaired in ED. Pateint denies any Meyer, nausea, vomiting. Denies any anticogulants.     RADIOLOGY:   IMPRESSION:    1. Small subdural hematoma layering along the falx cerebri.  2. Mild right parietal scalp soft tissue swelling without evidence of an   underlying calvarial fracture.      MEDS:      PHYSICAL EXAM:  Vital Signs Last 24 Hrs  T(C): 36.6 (14 Dec 2021 20:12), Max: 36.7 (14 Dec 2021 16:10)  T(F): 97.9 (14 Dec 2021 20:12), Max: 98 (14 Dec 2021 16:10)  HR: 67 (14 Dec 2021 20:12) (67 - 85)  BP: 167/80 (14 Dec 2021 20:12) (160/85 - 167/80)  BP(mean): --  RR: 18 (14 Dec 2021 20:12) (18 - 18)  SpO2: 100% (14 Dec 2021 20:12) (98% - 100%)    LABS:                        11.3   7.63  )-----------( 199      ( 14 Dec 2021 19:32 )             35.1     12-14    x   |  x   |  x   ----------------------------<  x   5.3   |  x   |  x     Ca    8.4      14 Dec 2021 19:32    TPro  6.3  /  Alb  3.4  /  TBili  0.4  /  DBili  x   /  AST  25  /  ALT  12  /  AlkPhos  92  12-14

## 2022-10-28 ENCOUNTER — EMERGENCY (EMERGENCY)
Facility: HOSPITAL | Age: 52
LOS: 1 days | Discharge: HOME | End: 2022-10-28
Attending: STUDENT IN AN ORGANIZED HEALTH CARE EDUCATION/TRAINING PROGRAM
Payer: MEDICARE

## 2022-10-28 VITALS
RESPIRATION RATE: 16 BRPM | HEART RATE: 72 BPM | WEIGHT: 194.01 LBS | TEMPERATURE: 98 F | OXYGEN SATURATION: 99 % | DIASTOLIC BLOOD PRESSURE: 88 MMHG | HEIGHT: 71 IN | SYSTOLIC BLOOD PRESSURE: 176 MMHG

## 2022-10-28 DIAGNOSIS — Z98.890 OTHER SPECIFIED POSTPROCEDURAL STATES: Chronic | ICD-10-CM

## 2022-10-28 PROCEDURE — 99284 EMERGENCY DEPT VISIT MOD MDM: CPT

## 2022-10-28 RX ORDER — ACETAMINOPHEN 500 MG
1000 TABLET ORAL ONCE
Refills: 0 | Status: COMPLETED | OUTPATIENT
Start: 2022-10-28 | End: 2022-10-28

## 2022-10-28 RX ORDER — SODIUM CHLORIDE 9 MG/ML
3 INJECTION INTRAMUSCULAR; INTRAVENOUS; SUBCUTANEOUS EVERY 8 HOURS
Refills: 0 | Status: DISCONTINUED | OUTPATIENT
Start: 2022-10-28 | End: 2022-11-01

## 2022-10-28 RX ADMIN — Medication 400 MILLIGRAM(S): at 23:44

## 2022-10-28 NOTE — ED ADULT TRIAGE NOTE - CHIEF COMPLAINT QUOTE
patient states " I have pain for three days, when I get up it's a ten "  Claims with umbilical hernia

## 2022-10-29 VITALS
TEMPERATURE: 98 F | RESPIRATION RATE: 18 BRPM | DIASTOLIC BLOOD PRESSURE: 89 MMHG | HEART RATE: 63 BPM | OXYGEN SATURATION: 97 % | SYSTOLIC BLOOD PRESSURE: 189 MMHG

## 2022-10-29 LAB
ALBUMIN SERPL ELPH-MCNC: 2.4 G/DL — LOW (ref 3.5–5)
ALP SERPL-CCNC: 78 U/L — SIGNIFICANT CHANGE UP (ref 40–120)
ALT FLD-CCNC: 17 U/L DA — SIGNIFICANT CHANGE UP (ref 10–60)
ANION GAP SERPL CALC-SCNC: 5 MMOL/L — SIGNIFICANT CHANGE UP (ref 5–17)
AST SERPL-CCNC: 19 U/L — SIGNIFICANT CHANGE UP (ref 10–40)
BASOPHILS # BLD AUTO: 0.05 K/UL — SIGNIFICANT CHANGE UP (ref 0–0.2)
BASOPHILS NFR BLD AUTO: 0.6 % — SIGNIFICANT CHANGE UP (ref 0–2)
BILIRUB SERPL-MCNC: 0.2 MG/DL — SIGNIFICANT CHANGE UP (ref 0.2–1.2)
BUN SERPL-MCNC: 32 MG/DL — HIGH (ref 7–18)
CALCIUM SERPL-MCNC: 8.5 MG/DL — SIGNIFICANT CHANGE UP (ref 8.4–10.5)
CHLORIDE SERPL-SCNC: 103 MMOL/L — SIGNIFICANT CHANGE UP (ref 96–108)
CO2 SERPL-SCNC: 33 MMOL/L — HIGH (ref 22–31)
CREAT SERPL-MCNC: 4.98 MG/DL — HIGH (ref 0.5–1.3)
EGFR: 13 ML/MIN/1.73M2 — LOW
EOSINOPHIL # BLD AUTO: 0.93 K/UL — HIGH (ref 0–0.5)
EOSINOPHIL NFR BLD AUTO: 11.8 % — HIGH (ref 0–6)
GLUCOSE SERPL-MCNC: 201 MG/DL — HIGH (ref 70–99)
HCT VFR BLD CALC: 32.4 % — LOW (ref 39–50)
HGB BLD-MCNC: 10.8 G/DL — LOW (ref 13–17)
IMM GRANULOCYTES NFR BLD AUTO: 0.3 % — SIGNIFICANT CHANGE UP (ref 0–0.9)
LACTATE SERPL-SCNC: 1.4 MMOL/L — SIGNIFICANT CHANGE UP (ref 0.7–2)
LIDOCAIN IGE QN: 410 U/L — HIGH (ref 73–393)
LYMPHOCYTES # BLD AUTO: 0.83 K/UL — LOW (ref 1–3.3)
LYMPHOCYTES # BLD AUTO: 10.5 % — LOW (ref 13–44)
MCHC RBC-ENTMCNC: 33.3 GM/DL — SIGNIFICANT CHANGE UP (ref 32–36)
MCHC RBC-ENTMCNC: 34.8 PG — HIGH (ref 27–34)
MCV RBC AUTO: 104.5 FL — HIGH (ref 80–100)
MONOCYTES # BLD AUTO: 0.64 K/UL — SIGNIFICANT CHANGE UP (ref 0–0.9)
MONOCYTES NFR BLD AUTO: 8.1 % — SIGNIFICANT CHANGE UP (ref 2–14)
NEUTROPHILS # BLD AUTO: 5.41 K/UL — SIGNIFICANT CHANGE UP (ref 1.8–7.4)
NEUTROPHILS NFR BLD AUTO: 68.7 % — SIGNIFICANT CHANGE UP (ref 43–77)
NRBC # BLD: 0 /100 WBCS — SIGNIFICANT CHANGE UP (ref 0–0)
PLATELET # BLD AUTO: 196 K/UL — SIGNIFICANT CHANGE UP (ref 150–400)
POTASSIUM SERPL-MCNC: 5.1 MMOL/L — SIGNIFICANT CHANGE UP (ref 3.5–5.3)
POTASSIUM SERPL-SCNC: 5.1 MMOL/L — SIGNIFICANT CHANGE UP (ref 3.5–5.3)
PROT SERPL-MCNC: 6.1 G/DL — SIGNIFICANT CHANGE UP (ref 6–8.3)
RBC # BLD: 3.1 M/UL — LOW (ref 4.2–5.8)
RBC # FLD: 14.5 % — SIGNIFICANT CHANGE UP (ref 10.3–14.5)
SODIUM SERPL-SCNC: 141 MMOL/L — SIGNIFICANT CHANGE UP (ref 135–145)
WBC # BLD: 7.88 K/UL — SIGNIFICANT CHANGE UP (ref 3.8–10.5)
WBC # FLD AUTO: 7.88 K/UL — SIGNIFICANT CHANGE UP (ref 3.8–10.5)

## 2022-10-29 PROCEDURE — 99284 EMERGENCY DEPT VISIT MOD MDM: CPT | Mod: 25

## 2022-10-29 PROCEDURE — 83690 ASSAY OF LIPASE: CPT

## 2022-10-29 PROCEDURE — 74176 CT ABD & PELVIS W/O CONTRAST: CPT | Mod: MA

## 2022-10-29 PROCEDURE — 85025 COMPLETE CBC W/AUTO DIFF WBC: CPT

## 2022-10-29 PROCEDURE — 96374 THER/PROPH/DIAG INJ IV PUSH: CPT

## 2022-10-29 PROCEDURE — 80074 ACUTE HEPATITIS PANEL: CPT

## 2022-10-29 PROCEDURE — 74176 CT ABD & PELVIS W/O CONTRAST: CPT | Mod: 26,MA

## 2022-10-29 PROCEDURE — 36415 COLL VENOUS BLD VENIPUNCTURE: CPT

## 2022-10-29 PROCEDURE — 83605 ASSAY OF LACTIC ACID: CPT

## 2022-10-29 PROCEDURE — 80053 COMPREHEN METABOLIC PANEL: CPT

## 2022-10-29 RX ORDER — DIATRIZOATE MEGLUMINE 180 MG/ML
30 INJECTION, SOLUTION INTRAVESICAL ONCE
Refills: 0 | Status: COMPLETED | OUTPATIENT
Start: 2022-10-29 | End: 2022-10-29

## 2022-10-29 RX ADMIN — DIATRIZOATE MEGLUMINE 30 MILLILITER(S): 180 INJECTION, SOLUTION INTRAVESICAL at 01:47

## 2022-10-29 NOTE — ED PROVIDER NOTE - PATIENT PORTAL LINK FT
You can access the FollowMyHealth Patient Portal offered by [facility] by registering at the following website: [email or web address]. By joining [facility]’s FollowMyHealth portal, you will also be able to view your health information using other applications (apps) compatible with our system.

## 2022-10-29 NOTE — ED ADULT NURSE NOTE - NSIMPLEMENTINTERV_GEN_ALL_ED
Implemented All Universal Safety Interventions:  [place] to call system. Call bell, personal items and telephone within reach. Instruct patient to call for assistance. Room bathroom lighting operational. Non-slip footwear when patient is off stretcher. Physically safe environment: no spills, clutter or unnecessary equipment. Stretcher in lowest position, wheels locked, appropriate side rails in place.

## 2022-10-29 NOTE — ED PROVIDER NOTE - DURATION
Jersey called back and said [facility] needed an order put in his chart for Eval and Treat for low back pain.   Notified him I would forward the message to Dr Garcia and she will order PT next week .  Verb understanding given    day(s)

## 2022-10-29 NOTE — ED PROVIDER NOTE - PHYSICAL EXAMINATION
Vital Signs Reviewed  GEN: Comfortable, NAD, AAOx3  HEENT: NCAT, EOMI, MMM, Neck Supple  RESP: CTAB, No rales/rhonchi/wheezing  CV: RRR, S1S2, No murmurs  ABD: No TTP, ND, No masses, Right CVA Tenderness  Extrem/Skin: Equal pulses bilat, No cyanosis/edema/rashes  Neuro: No focal deficits Vital Signs Reviewed  GEN: Comfortable, NAD, AAOx3  HEENT: NCAT, EOMI, MMM, Neck Supple  RESP: CTAB, No rales/rhonchi/wheezing  CV: RRR, S1S2, No murmurs  ABD: ND, No masses, Umbilical hernia soft minimal tenderness to palpation, no skin changes, unable to fully reduce.   Extrem/Skin: Equal pulses bilat, No cyanosis/edema/rashes  Neuro: No focal deficits

## 2022-10-29 NOTE — ED PROVIDER NOTE - CLINICAL SUMMARY MEDICAL DECISION MAKING FREE TEXT BOX
Patient presenting with signs of symptoms previous kidney stones. Labs and urine are pending. Patient's stable and will reassess. Patient presenting with pain over the umbilical hernia, hernia is soft and able to partially reduct though not fully. Obtaining labs and CT. Patient's stable and will reassess. Patient presenting with pain over the umbilical hernia, hernia is soft and able to partially reduce though not fully. Obtaining labs and CT. Patient's stable and will reassess.    Labs unremarkable. CT showing hernia without signs of ischemia. On reassessment pt is sleeping comfortably. When woken pt states that he has pain when standing. Rec urgent gen surg f/u and pt advised to get HD on Monday as he received PO contrast. Most likely hernia w/o surgical complications - the details of the case, history, and exam make more emergent diagnoses much less likely. Discussed with pt my clinical impression and results, patient given strict return precautions if persistent or worsening of symptoms occurs, and need for close follow up. Pt expressed understanding and agrees with plan. Pt is well appearing with a reassuring exam. Discharge home with PMD or Specialist f/u within 5 days.

## 2022-10-29 NOTE — ED PROVIDER NOTE - NSFOLLOWUPINSTRUCTIONS_ED_ALL_ED_FT
You were seen in the emergency room today for a hernia. Please see the General Surgery doctors at the next available appointment. Please call your primary doctor to inform them of this ER visit and obtain the next available appointment within the next 5 days. As we discussed, return to the ER if you have any worsening symptoms.    We no longer feel that you need further emergency care or admission to the hospital at this time.    While we have determined that you are currently stable for discharge, we know that things can change. Please seek immediate medical attention or return to the ER if you experience any of the following:  Any worsening or persistent symptoms  Severe Pain  Chest Pain  Difficulty Breathing  Bleeding  Passing Out  Severe Rash  Inability to Eat or Drink  Persistent Fever    Please see a primary care doctor or specialist within 5 days to ensure that you are improving.    Please call the [facility] phone numbers on this document if you have any problems obtaining a follow up appointment.    I wish you well! -Dr León

## 2022-10-29 NOTE — ED ADULT NURSE NOTE - DOES PATIENT HAVE ADVANCE DIRECTIVE
in case of emergency call patient 's girl friend Sofie [phone] to make medical decisions , HCP form given to pt/No

## 2022-10-29 NOTE — ED PROVIDER NOTE - OBJECTIVE STATEMENT
52 year old male with a PHMx of ESRD ( last dialysis this morning) ?CHF, psoriasis and many years of umbilical hernia pain presents to the ED with pain in the umbilical hernia times several days with no other associated symptoms. Patient states his pain is worse with standing up and certain movements. Patient continuing eating normally and passing gas normally. No nausea, vomiting, fevers, skin changes, urinary symptoms, chest pain, shortness of breath, syncope and other recent illnesses or hospitalizations. NKDA.

## 2022-10-29 NOTE — ED PROVIDER NOTE - CHPI ED SYMPTOMS NEG
no skin changes, no urinary symptoms, no chest pain, no shortness of breath, no syncope/no fever/no nausea/no vomiting

## 2022-10-29 NOTE — ED PROVIDER NOTE - NSFOLLOWUPCLINICS_GEN_ALL_ED_FT
[facility]  [address] [place] [address]  Phone: [phone]  Fax: [phone]  Follow Up Time: Urgent

## 2022-10-31 PROBLEM — Z00.00 ENCOUNTER FOR PREVENTIVE HEALTH EXAMINATION: Status: ACTIVE | Noted: 2022-10-31

## 2022-11-03 ENCOUNTER — APPOINTMENT (OUTPATIENT)
Dept: SURGERY | Facility: CLINIC | Age: 52
End: 2022-11-03

## 2022-11-03 VITALS
DIASTOLIC BLOOD PRESSURE: 91 MMHG | SYSTOLIC BLOOD PRESSURE: 195 MMHG | WEIGHT: 202.82 LBS | HEART RATE: 85 BPM | BODY MASS INDEX: 28.4 KG/M2 | HEIGHT: 71 IN

## 2022-11-03 VITALS — TEMPERATURE: 97.5 F

## 2022-11-03 DIAGNOSIS — Z01.818 ENCOUNTER FOR OTHER PREPROCEDURAL EXAMINATION: ICD-10-CM

## 2022-11-03 DIAGNOSIS — I50.9 HEART FAILURE, UNSPECIFIED: ICD-10-CM

## 2022-11-03 DIAGNOSIS — Z87.891 PERSONAL HISTORY OF NICOTINE DEPENDENCE: ICD-10-CM

## 2022-11-03 DIAGNOSIS — I10 ESSENTIAL (PRIMARY) HYPERTENSION: ICD-10-CM

## 2022-11-03 DIAGNOSIS — K40.90 UNILATERAL INGUINAL HERNIA, W/OUT OBSTRUCTION OR GANGRENE, NOT SPECIFIED AS RECURRENT: ICD-10-CM

## 2022-11-03 DIAGNOSIS — N19 UNSPECIFIED KIDNEY FAILURE: ICD-10-CM

## 2022-11-03 DIAGNOSIS — E11.9 TYPE 2 DIABETES MELLITUS W/OUT COMPLICATIONS: ICD-10-CM

## 2022-11-03 DIAGNOSIS — K42.0 UMBILICAL HERNIA WITH OBSTRUCTION, W/OUT GANGRENE: ICD-10-CM

## 2022-11-03 PROCEDURE — 99203 OFFICE O/P NEW LOW 30 MIN: CPT

## 2022-11-03 RX ORDER — CARVEDILOL 12.5 MG/1
12.5 TABLET, FILM COATED ORAL
Qty: 180 | Refills: 0 | Status: ACTIVE | COMMUNITY
Start: 2021-12-28

## 2022-11-03 RX ORDER — CALCIUM ACETATE 667 MG
667 TABLET ORAL
Qty: 90 | Refills: 0 | Status: ACTIVE | COMMUNITY
Start: 2022-10-15

## 2022-11-03 RX ORDER — LOVASTATIN 10 MG/1
10 TABLET ORAL
Qty: 90 | Refills: 0 | Status: ACTIVE | COMMUNITY
Start: 2022-05-31

## 2022-11-03 RX ORDER — LIDOCAINE AND PRILOCAINE 25; 25 MG/G; MG/G
2.5-2.5 CREAM TOPICAL
Qty: 60 | Refills: 0 | Status: ACTIVE | COMMUNITY
Start: 2022-05-31

## 2022-11-03 RX ORDER — TRAZODONE HYDROCHLORIDE 50 MG/1
50 TABLET ORAL
Qty: 30 | Refills: 0 | Status: ACTIVE | COMMUNITY
Start: 2021-12-31

## 2022-11-03 RX ORDER — FOLIC ACID/VIT B COMPLEX AND C 0.8 MG
TABLET ORAL
Qty: 90 | Refills: 0 | Status: ACTIVE | COMMUNITY
Start: 2022-05-31

## 2022-11-03 RX ORDER — LISINOPRIL 20 MG/1
20 TABLET ORAL
Qty: 90 | Refills: 0 | Status: ACTIVE | COMMUNITY
Start: 2022-07-25

## 2022-11-03 RX ORDER — GLIPIZIDE 5 MG/1
5 TABLET ORAL
Qty: 146 | Refills: 0 | Status: ACTIVE | COMMUNITY
Start: 2022-10-22

## 2022-11-03 RX ORDER — TRAZODONE HYDROCHLORIDE 100 MG/1
100 TABLET ORAL
Qty: 30 | Refills: 0 | Status: ACTIVE | COMMUNITY
Start: 2022-05-31

## 2022-11-03 NOTE — PLAN
[FreeTextEntry1] : Mr. ALAINA CANO  was informed of significance of findings. All options, risks and benefits were discussed at length. Informed consent for repair of R inguinal hernia  with the use of mesh, and repair of incarcerated umbilical hernia w poss mesh, and the potential post op complications were discussed, including infection, recurrence and other related complications. \par The patient wishes to proceed with the planned surgery. We will schedule for surgery at the next available date, pending any required insurance pre-certification or pre-approval. Patient agrees to obtain any necessary pre-operative evaluations and testing prior to surgery.\par He was advised to seek immediate medical attention with any acute change in symptoms or with the development of any new or worsening symptoms. \par Patient's questions and concerns addressed to patient's satisfaction, and patient verbalized an understanding of the information discussed.\par \par \par Will schedule for the surgery at [facility] [place].

## 2022-11-03 NOTE — PHYSICAL EXAM
[No Rash or Lesion] : No rash or lesion [Alert] : alert [Oriented to Person] : oriented to person [Oriented to Place] : oriented to place [Oriented to Time] : oriented to time [Calm] : calm [de-identified] : A/Ox3; NAD. appears comfortable [de-identified] : EOMI; sclera anicteric. [de-identified] : no cervical lymphadenopathy  [de-identified] : airway patent, no use of accessory muscles [de-identified] : abd is soft, NT/ND\par incarcerated umbilical hernia  [de-identified] : No penile discharge or lesions. No scrotal swelling or discoloration. Testes descended bilaterally, smooth, without masses. Epididymis non-tender. Right Inguinal Hernia  [de-identified] : +ROM, normal gait

## 2022-11-03 NOTE — REVIEW OF SYSTEMS
[Fever] : no fever [Chills] : no chills [Feeling Poorly] : not feeling poorly [Chest Pain] : no chest pain [Lower Ext Edema] : no extremity edema [Shortness Of Breath] : no shortness of breath [Cough] : no cough [Abdominal Pain] : no abdominal pain [Anxiety] : no anxiety [Muscle Weakness] : no muscle weakness [Swollen Glands] : no swollen glands [FreeTextEntry8] : c/o pain to the right side of groin

## 2022-11-03 NOTE — HISTORY OF PRESENT ILLNESS
[de-identified] : Mr. CANO is a 52 year y/o M w PMHX CKD, on dialysis M W F (since 2019), HTN, T2DM, CHF, presents for consultation visit w the cc of having pain to the right side of groin and discomfort to the umbilicus.

## 2022-11-03 NOTE — REASON FOR VISIT
[Consultation] : a consultation visit [FreeTextEntry1] : discomfort, umbilicus, right side of groin

## 2022-11-03 NOTE — CONSULT LETTER
[Dear  ___] : Dear  [unfilled], [Consult Letter:] : I had the pleasure of evaluating your patient, [unfilled]. [Consult Closing:] : Thank you very much for allowing me to participate in the care of this patient.  If you have any questions, please do not hesitate to contact me. [Sincerely,] : Sincerely, [FreeTextEntry3] : Michael Escobedo MD\par

## 2022-11-03 NOTE — ASSESSMENT
[FreeTextEntry1] : Mr. CANO is a 52 year y/o M who presents with incarcerated umbilical hernia and R Inguinal Hernia.

## 2022-12-20 ENCOUNTER — OUTPATIENT (OUTPATIENT)
Dept: OUTPATIENT SERVICES | Facility: HOSPITAL | Age: 52
LOS: 1 days | End: 2022-12-20
Payer: MEDICARE

## 2022-12-20 VITALS
SYSTOLIC BLOOD PRESSURE: 178 MMHG | RESPIRATION RATE: 16 BRPM | HEIGHT: 71 IN | WEIGHT: 210.1 LBS | OXYGEN SATURATION: 98 % | TEMPERATURE: 98 F | DIASTOLIC BLOOD PRESSURE: 84 MMHG | HEART RATE: 61 BPM

## 2022-12-20 DIAGNOSIS — Z98.890 OTHER SPECIFIED POSTPROCEDURAL STATES: Chronic | ICD-10-CM

## 2022-12-20 DIAGNOSIS — K40.90 UNILATERAL INGUINAL HERNIA, WITHOUT OBSTRUCTION OR GANGRENE, NOT SPECIFIED AS RECURRENT: ICD-10-CM

## 2022-12-20 DIAGNOSIS — K42.0 UMBILICAL HERNIA WITH OBSTRUCTION, WITHOUT GANGRENE: ICD-10-CM

## 2022-12-20 DIAGNOSIS — I77.0 ARTERIOVENOUS FISTULA, ACQUIRED: Chronic | ICD-10-CM

## 2022-12-20 DIAGNOSIS — N18.6 END STAGE RENAL DISEASE: ICD-10-CM

## 2022-12-20 DIAGNOSIS — E11.9 TYPE 2 DIABETES MELLITUS WITHOUT COMPLICATIONS: ICD-10-CM

## 2022-12-20 DIAGNOSIS — Z01.818 ENCOUNTER FOR OTHER PREPROCEDURAL EXAMINATION: ICD-10-CM

## 2022-12-20 DIAGNOSIS — Z29.9 ENCOUNTER FOR PROPHYLACTIC MEASURES, UNSPECIFIED: ICD-10-CM

## 2022-12-20 DIAGNOSIS — I50.9 HEART FAILURE, UNSPECIFIED: ICD-10-CM

## 2022-12-20 DIAGNOSIS — I10 ESSENTIAL (PRIMARY) HYPERTENSION: ICD-10-CM

## 2022-12-20 DIAGNOSIS — E78.5 HYPERLIPIDEMIA, UNSPECIFIED: ICD-10-CM

## 2022-12-20 PROCEDURE — 93005 ELECTROCARDIOGRAM TRACING: CPT

## 2022-12-20 PROCEDURE — G0463: CPT

## 2022-12-20 RX ORDER — SEVELAMER CARBONATE 2400 MG/1
2 POWDER, FOR SUSPENSION ORAL
Qty: 0 | Refills: 0 | DISCHARGE

## 2022-12-20 RX ORDER — LISINOPRIL 2.5 MG/1
1 TABLET ORAL
Qty: 0 | Refills: 0 | DISCHARGE

## 2022-12-20 RX ORDER — AMLODIPINE BESYLATE 2.5 MG/1
1 TABLET ORAL
Qty: 0 | Refills: 0 | DISCHARGE

## 2022-12-20 NOTE — H&P PST ADULT - NSICDXPASTSURGICALHX_GEN_ALL_CORE_FT
PAST SURGICAL HISTORY:  AV fistula     H/O right wrist surgery tendons repair - at age 14    History of vascular access device right chest permacath - 2/2019    S/P arthroscopy of right shoulder 2010

## 2022-12-20 NOTE — H&P PST ADULT - NSICDXPROCEDURE_GEN_ALL_CORE_FT
PROCEDURES:  Repair of incarcerated umbilical hernia in patient older than 5 years of age 20-Dec-2022 16:01:50  Nicky Donahue  Initial repair, hernia, inguinal, reducible, age 5 years or older 20-Dec-2022 16:03:54  Nicky Donahue

## 2022-12-20 NOTE — H&P PST ADULT - ASSESSMENT
51 yo male is scheduled for : Repair of Incarcerated Umbilical Hernia with Possible Mesh and Right Inguinal Hernia Repair with Mesh, on 12/30/22

## 2022-12-20 NOTE — H&P PST ADULT - PROBLEM SELECTOR PLAN 2
Repair of Incarcerated Umbilical Hernia with Possible Mesh and Right Inguinal Hernia Repair with Mesh

## 2022-12-20 NOTE — H&P PST ADULT - PROBLEM SELECTOR PLAN 1
Repair of Incarcerated Umbilical Hernia with Possible Mesh and Right Inguinal Hernia Repair with Mesh        STOP BANG : 3  Intermediate risk for BARBARA complication

## 2022-12-20 NOTE — H&P PST ADULT - WEIGHT IN KG
Patient contacted. Results message read. Patient states calf pain is intermittent. He will monitor this and call in a week if pain doesn't improve. No orders entered at this time.
Xrays of the left calf look good except for minimal arthritis of the knee. If he still having left calf pain have him do an MRI of the lumbar spine without contrast to rule out lumbar radiculopathy.
95.3

## 2022-12-20 NOTE — H&P PST ADULT - HISTORY OF PRESENT ILLNESS
53 yo male with history od HTN, DM, CHF, ESRD (HD MWF via left AVF placed in 2021), reports the above.  Patient states has had umbilical hernia for approximately one year, the right inguinal hernia for approximately two months.  He wants to have the surgery because the right inguinal hernia is very painful.  He is scheduled for : Repair of Incarcerated Umbilical Hernia with Possible Mesh and Right Inguinal Hernia Repair with Mesh, on 12/30/22

## 2022-12-20 NOTE — H&P PST ADULT - PROBLEM SELECTOR PLAN 8
Instruction regarding chlorhexidine soap use is given.  Patient verbalized understanding. Literature also given

## 2022-12-20 NOTE — H&P PST ADULT - PROBLEM SELECTOR PLAN 5
Continue HD MWF  Surgery falls on Friday  Advised patient to have HD the day before.  Also spoke to nurse Parnell at [facility] [place]

## 2023-01-20 ENCOUNTER — APPOINTMENT (OUTPATIENT)
Dept: SURGERY | Facility: HOSPITAL | Age: 53
End: 2023-01-20

## 2023-06-28 NOTE — ASU DISCHARGE PLAN (ADULT/PEDIATRIC) - ACTIVITY LEVEL
No heavy lifting No excercise/No sports/gym/No heavy lifting Cimzia Pregnancy And Lactation Text: This medication crosses the placenta but can be considered safe in certain situations. Cimzia may be excreted in breast milk.

## 2023-10-01 ENCOUNTER — INPATIENT (INPATIENT)
Facility: HOSPITAL | Age: 53
LOS: 1 days | Discharge: HOME | DRG: 917 | End: 2023-10-03
Attending: STUDENT IN AN ORGANIZED HEALTH CARE EDUCATION/TRAINING PROGRAM | Admitting: STUDENT IN AN ORGANIZED HEALTH CARE EDUCATION/TRAINING PROGRAM
Payer: COMMERCIAL

## 2023-10-01 VITALS
TEMPERATURE: 98 F | HEART RATE: 34 BPM | DIASTOLIC BLOOD PRESSURE: 58 MMHG | RESPIRATION RATE: 22 BRPM | WEIGHT: 205.03 LBS | SYSTOLIC BLOOD PRESSURE: 95 MMHG | HEIGHT: 71 IN | OXYGEN SATURATION: 100 %

## 2023-10-01 DIAGNOSIS — Z98.890 OTHER SPECIFIED POSTPROCEDURAL STATES: Chronic | ICD-10-CM

## 2023-10-01 DIAGNOSIS — I77.0 ARTERIOVENOUS FISTULA, ACQUIRED: Chronic | ICD-10-CM

## 2023-10-01 LAB
ALBUMIN SERPL ELPH-MCNC: 3.1 G/DL — LOW (ref 3.5–5)
ALP SERPL-CCNC: 93 U/L — SIGNIFICANT CHANGE UP (ref 40–120)
ALT FLD-CCNC: 17 U/L DA — SIGNIFICANT CHANGE UP (ref 10–60)
ANION GAP SERPL CALC-SCNC: 7 MMOL/L — SIGNIFICANT CHANGE UP (ref 5–17)
AST SERPL-CCNC: 24 U/L — SIGNIFICANT CHANGE UP (ref 10–40)
BASOPHILS # BLD AUTO: 0.07 K/UL — SIGNIFICANT CHANGE UP (ref 0–0.2)
BASOPHILS NFR BLD AUTO: 0.8 % — SIGNIFICANT CHANGE UP (ref 0–2)
BILIRUB SERPL-MCNC: 0.5 MG/DL — SIGNIFICANT CHANGE UP (ref 0.2–1.2)
BUN SERPL-MCNC: 73 MG/DL — HIGH (ref 7–18)
CALCIUM SERPL-MCNC: 8.4 MG/DL — SIGNIFICANT CHANGE UP (ref 8.4–10.5)
CHLORIDE SERPL-SCNC: 103 MMOL/L — SIGNIFICANT CHANGE UP (ref 96–108)
CO2 SERPL-SCNC: 26 MMOL/L — SIGNIFICANT CHANGE UP (ref 22–31)
CREAT SERPL-MCNC: 8.66 MG/DL — HIGH (ref 0.5–1.3)
EGFR: 7 ML/MIN/1.73M2 — LOW
EOSINOPHIL # BLD AUTO: 1.01 K/UL — HIGH (ref 0–0.5)
EOSINOPHIL NFR BLD AUTO: 12 % — HIGH (ref 0–6)
FLUAV AG NPH QL: SIGNIFICANT CHANGE UP
FLUBV AG NPH QL: SIGNIFICANT CHANGE UP
GAS PNL BLDV: SIGNIFICANT CHANGE UP
GLUCOSE SERPL-MCNC: 234 MG/DL — HIGH (ref 70–99)
HCT VFR BLD CALC: 35.6 % — LOW (ref 39–50)
HGB BLD-MCNC: 11.3 G/DL — LOW (ref 13–17)
IMM GRANULOCYTES NFR BLD AUTO: 0.4 % — SIGNIFICANT CHANGE UP (ref 0–0.9)
LACTATE SERPL-SCNC: 2.1 MMOL/L — HIGH (ref 0.7–2)
LYMPHOCYTES # BLD AUTO: 0.82 K/UL — LOW (ref 1–3.3)
LYMPHOCYTES # BLD AUTO: 9.8 % — LOW (ref 13–44)
MAGNESIUM SERPL-MCNC: 2.3 MG/DL — SIGNIFICANT CHANGE UP (ref 1.6–2.6)
MCHC RBC-ENTMCNC: 31.4 PG — SIGNIFICANT CHANGE UP (ref 27–34)
MCHC RBC-ENTMCNC: 31.7 GM/DL — LOW (ref 32–36)
MCV RBC AUTO: 98.9 FL — SIGNIFICANT CHANGE UP (ref 80–100)
MONOCYTES # BLD AUTO: 0.78 K/UL — SIGNIFICANT CHANGE UP (ref 0–0.9)
MONOCYTES NFR BLD AUTO: 9.3 % — SIGNIFICANT CHANGE UP (ref 2–14)
NEUTROPHILS # BLD AUTO: 5.7 K/UL — SIGNIFICANT CHANGE UP (ref 1.8–7.4)
NEUTROPHILS NFR BLD AUTO: 67.7 % — SIGNIFICANT CHANGE UP (ref 43–77)
NRBC # BLD: 0 /100 WBCS — SIGNIFICANT CHANGE UP (ref 0–0)
PHOSPHATE SERPL-MCNC: 6 MG/DL — HIGH (ref 2.5–4.5)
PLATELET # BLD AUTO: 210 K/UL — SIGNIFICANT CHANGE UP (ref 150–400)
POTASSIUM SERPL-MCNC: 7.4 MMOL/L — CRITICAL HIGH (ref 3.5–5.3)
POTASSIUM SERPL-SCNC: 7.4 MMOL/L — CRITICAL HIGH (ref 3.5–5.3)
PROT SERPL-MCNC: 7.3 G/DL — SIGNIFICANT CHANGE UP (ref 6–8.3)
RBC # BLD: 3.6 M/UL — LOW (ref 4.2–5.8)
RBC # FLD: 14.7 % — HIGH (ref 10.3–14.5)
SARS-COV-2 RNA SPEC QL NAA+PROBE: SIGNIFICANT CHANGE UP
SODIUM SERPL-SCNC: 136 MMOL/L — SIGNIFICANT CHANGE UP (ref 135–145)
TROPONIN I, HIGH SENSITIVITY RESULT: 449.1 NG/L — HIGH
WBC # BLD: 8.41 K/UL — SIGNIFICANT CHANGE UP (ref 3.8–10.5)
WBC # FLD AUTO: 8.41 K/UL — SIGNIFICANT CHANGE UP (ref 3.8–10.5)

## 2023-10-01 PROCEDURE — 99291 CRITICAL CARE FIRST HOUR: CPT | Mod: GC

## 2023-10-01 RX ORDER — INSULIN HUMAN 100 [IU]/ML
5 INJECTION, SOLUTION SUBCUTANEOUS ONCE
Refills: 0 | Status: DISCONTINUED | OUTPATIENT
Start: 2023-10-01 | End: 2023-10-01

## 2023-10-01 RX ORDER — CALCIUM GLUCONATE 100 MG/ML
1 VIAL (ML) INTRAVENOUS ONCE
Refills: 0 | Status: DISCONTINUED | OUTPATIENT
Start: 2023-10-01 | End: 2023-10-01

## 2023-10-01 RX ORDER — SODIUM CHLORIDE 9 MG/ML
100 INJECTION INTRAMUSCULAR; INTRAVENOUS; SUBCUTANEOUS
Refills: 0 | Status: DISCONTINUED | OUTPATIENT
Start: 2023-10-01 | End: 2023-10-03

## 2023-10-01 RX ORDER — MUPIROCIN 20 MG/G
1 OINTMENT TOPICAL
Refills: 0 | Status: DISCONTINUED | OUTPATIENT
Start: 2023-10-01 | End: 2023-10-03

## 2023-10-01 RX ORDER — DEXTROSE 50 % IN WATER 50 %
25 SYRINGE (ML) INTRAVENOUS ONCE
Refills: 0 | Status: DISCONTINUED | OUTPATIENT
Start: 2023-10-01 | End: 2023-10-01

## 2023-10-01 RX ORDER — SODIUM BICARBONATE 1 MEQ/ML
50 SYRINGE (ML) INTRAVENOUS ONCE
Refills: 0 | Status: COMPLETED | OUTPATIENT
Start: 2023-10-01 | End: 2023-10-01

## 2023-10-01 RX ORDER — CALCIUM GLUCONATE 100 MG/ML
2 VIAL (ML) INTRAVENOUS ONCE
Refills: 0 | Status: COMPLETED | OUTPATIENT
Start: 2023-10-01 | End: 2023-10-01

## 2023-10-01 RX ADMIN — Medication 100 GRAM(S): at 23:07

## 2023-10-01 RX ADMIN — Medication 200 GRAM(S): at 23:28

## 2023-10-01 RX ADMIN — Medication 50 MILLIEQUIVALENT(S): at 23:05

## 2023-10-01 NOTE — CHART NOTE - NSCHARTNOTEFT_GEN_A_CORE
Full consult note to follow.    Notified by Internal Medicine Resident team that this patient is requiring emergent dialysis. In brief, this is a 52 year old male with PMHx of ESRD on HD (M/W/F), HTN, DM2 who arrived at the hospital due to feeing weak. Initial VBG is notable for severe hyperkalemia of 7.3 mEq/L. The patient is also bradycardia, with heart rates at less than 50 bpm. There is hypotension present, with BP of 95/58, though that has since improved. Orders for emergent hemodialysis have been placed, and the nursing supervisor has been contacted to inform the on call dialysis nurse to initiate dialysis.

## 2023-10-02 DIAGNOSIS — R00.1 BRADYCARDIA, UNSPECIFIED: ICD-10-CM

## 2023-10-02 DIAGNOSIS — R79.89 OTHER SPECIFIED ABNORMAL FINDINGS OF BLOOD CHEMISTRY: ICD-10-CM

## 2023-10-02 DIAGNOSIS — I50.9 HEART FAILURE, UNSPECIFIED: ICD-10-CM

## 2023-10-02 DIAGNOSIS — K74.60 UNSPECIFIED CIRRHOSIS OF LIVER: ICD-10-CM

## 2023-10-02 DIAGNOSIS — E11.9 TYPE 2 DIABETES MELLITUS WITHOUT COMPLICATIONS: ICD-10-CM

## 2023-10-02 DIAGNOSIS — E87.5 HYPERKALEMIA: ICD-10-CM

## 2023-10-02 DIAGNOSIS — R18.8 OTHER ASCITES: ICD-10-CM

## 2023-10-02 DIAGNOSIS — Z29.9 ENCOUNTER FOR PROPHYLACTIC MEASURES, UNSPECIFIED: ICD-10-CM

## 2023-10-02 LAB
ANION GAP SERPL CALC-SCNC: 3 MMOL/L — LOW (ref 5–17)
ANION GAP SERPL CALC-SCNC: 8 MMOL/L — SIGNIFICANT CHANGE UP (ref 5–17)
BUN SERPL-MCNC: 44 MG/DL — HIGH (ref 7–18)
BUN SERPL-MCNC: 74 MG/DL — HIGH (ref 7–18)
CALCIUM SERPL-MCNC: 8.2 MG/DL — LOW (ref 8.4–10.5)
CALCIUM SERPL-MCNC: 8.4 MG/DL — SIGNIFICANT CHANGE UP (ref 8.4–10.5)
CHLORIDE SERPL-SCNC: 104 MMOL/L — SIGNIFICANT CHANGE UP (ref 96–108)
CHLORIDE SERPL-SCNC: 105 MMOL/L — SIGNIFICANT CHANGE UP (ref 96–108)
CK MB BLD-MCNC: 4.5 % — HIGH (ref 0–3.5)
CK MB CFR SERPL CALC: 5 NG/ML — HIGH (ref 0–3.6)
CK SERPL-CCNC: 112 U/L — SIGNIFICANT CHANGE UP (ref 35–232)
CO2 SERPL-SCNC: 28 MMOL/L — SIGNIFICANT CHANGE UP (ref 22–31)
CO2 SERPL-SCNC: 30 MMOL/L — SIGNIFICANT CHANGE UP (ref 22–31)
CREAT SERPL-MCNC: 5 MG/DL — HIGH (ref 0.5–1.3)
CREAT SERPL-MCNC: 8.85 MG/DL — HIGH (ref 0.5–1.3)
EGFR: 13 ML/MIN/1.73M2 — LOW
EGFR: 7 ML/MIN/1.73M2 — LOW
GLUCOSE BLDC GLUCOMTR-MCNC: 102 MG/DL — HIGH (ref 70–99)
GLUCOSE BLDC GLUCOMTR-MCNC: 108 MG/DL — HIGH (ref 70–99)
GLUCOSE BLDC GLUCOMTR-MCNC: 66 MG/DL — LOW (ref 70–99)
GLUCOSE SERPL-MCNC: 85 MG/DL — SIGNIFICANT CHANGE UP (ref 70–99)
GLUCOSE SERPL-MCNC: 90 MG/DL — SIGNIFICANT CHANGE UP (ref 70–99)
HBV CORE AB SER-ACNC: SIGNIFICANT CHANGE UP
HBV SURFACE AB SER-ACNC: >1000 MIU/ML — SIGNIFICANT CHANGE UP
HBV SURFACE AB SER-ACNC: REACTIVE
HBV SURFACE AG SER-ACNC: SIGNIFICANT CHANGE UP
HCT VFR BLD CALC: 32 % — LOW (ref 39–50)
HCV AB S/CO SERPL IA: 0.12 S/CO — SIGNIFICANT CHANGE UP (ref 0–0.99)
HCV AB SERPL-IMP: SIGNIFICANT CHANGE UP
HGB BLD-MCNC: 10.4 G/DL — LOW (ref 13–17)
LACTATE SERPL-SCNC: 0.8 MMOL/L — SIGNIFICANT CHANGE UP (ref 0.7–2)
MAGNESIUM SERPL-MCNC: 2.1 MG/DL — SIGNIFICANT CHANGE UP (ref 1.6–2.6)
MCHC RBC-ENTMCNC: 31.5 PG — SIGNIFICANT CHANGE UP (ref 27–34)
MCHC RBC-ENTMCNC: 32.5 GM/DL — SIGNIFICANT CHANGE UP (ref 32–36)
MCV RBC AUTO: 97 FL — SIGNIFICANT CHANGE UP (ref 80–100)
NRBC # BLD: 0 /100 WBCS — SIGNIFICANT CHANGE UP (ref 0–0)
NT-PROBNP SERPL-SCNC: HIGH PG/ML (ref 0–125)
PHOSPHATE SERPL-MCNC: 3.3 MG/DL — SIGNIFICANT CHANGE UP (ref 2.5–4.5)
PLATELET # BLD AUTO: 183 K/UL — SIGNIFICANT CHANGE UP (ref 150–400)
POTASSIUM SERPL-MCNC: 4.1 MMOL/L — SIGNIFICANT CHANGE UP (ref 3.5–5.3)
POTASSIUM SERPL-MCNC: 5.4 MMOL/L — HIGH (ref 3.5–5.3)
POTASSIUM SERPL-SCNC: 4.1 MMOL/L — SIGNIFICANT CHANGE UP (ref 3.5–5.3)
POTASSIUM SERPL-SCNC: 5.4 MMOL/L — HIGH (ref 3.5–5.3)
RBC # BLD: 3.3 M/UL — LOW (ref 4.2–5.8)
RBC # FLD: 14.9 % — HIGH (ref 10.3–14.5)
SODIUM SERPL-SCNC: 137 MMOL/L — SIGNIFICANT CHANGE UP (ref 135–145)
SODIUM SERPL-SCNC: 141 MMOL/L — SIGNIFICANT CHANGE UP (ref 135–145)
TROPONIN I, HIGH SENSITIVITY RESULT: 332.5 NG/L — HIGH
WBC # BLD: 6.84 K/UL — SIGNIFICANT CHANGE UP (ref 3.8–10.5)
WBC # FLD AUTO: 6.84 K/UL — SIGNIFICANT CHANGE UP (ref 3.8–10.5)

## 2023-10-02 PROCEDURE — 99222 1ST HOSP IP/OBS MODERATE 55: CPT | Mod: GC

## 2023-10-02 PROCEDURE — 99223 1ST HOSP IP/OBS HIGH 75: CPT

## 2023-10-02 PROCEDURE — 93306 TTE W/DOPPLER COMPLETE: CPT | Mod: 26

## 2023-10-02 PROCEDURE — 71045 X-RAY EXAM CHEST 1 VIEW: CPT | Mod: 26

## 2023-10-02 RX ORDER — INSULIN LISPRO 100/ML
VIAL (ML) SUBCUTANEOUS
Refills: 0 | Status: DISCONTINUED | OUTPATIENT
Start: 2023-10-02 | End: 2023-10-03

## 2023-10-02 RX ORDER — INSULIN HUMAN 100 [IU]/ML
5 INJECTION, SOLUTION SUBCUTANEOUS ONCE
Refills: 0 | Status: COMPLETED | OUTPATIENT
Start: 2023-10-02 | End: 2023-10-02

## 2023-10-02 RX ORDER — LOVASTATIN 20 MG
1 TABLET ORAL
Qty: 0 | Refills: 0 | DISCHARGE

## 2023-10-02 RX ORDER — CARVEDILOL PHOSPHATE 80 MG/1
1 CAPSULE, EXTENDED RELEASE ORAL
Qty: 0 | Refills: 0 | DISCHARGE

## 2023-10-02 RX ORDER — TRAZODONE HCL 50 MG
50 TABLET ORAL AT BEDTIME
Refills: 0 | Status: DISCONTINUED | OUTPATIENT
Start: 2023-10-02 | End: 2023-10-03

## 2023-10-02 RX ORDER — NIFEDIPINE 30 MG
60 TABLET, EXTENDED RELEASE 24 HR ORAL DAILY
Refills: 0 | Status: DISCONTINUED | OUTPATIENT
Start: 2023-10-02 | End: 2023-10-03

## 2023-10-02 RX ORDER — HEPARIN SODIUM 5000 [USP'U]/ML
5000 INJECTION INTRAVENOUS; SUBCUTANEOUS EVERY 8 HOURS
Refills: 0 | Status: DISCONTINUED | OUTPATIENT
Start: 2023-10-02 | End: 2023-10-03

## 2023-10-02 RX ORDER — TRAZODONE HCL 50 MG
50 TABLET ORAL ONCE
Refills: 0 | Status: COMPLETED | OUTPATIENT
Start: 2023-10-02 | End: 2023-10-02

## 2023-10-02 RX ORDER — INFLUENZA VIRUS VACCINE 15; 15; 15; 15 UG/.5ML; UG/.5ML; UG/.5ML; UG/.5ML
0.5 SUSPENSION INTRAMUSCULAR ONCE
Refills: 0 | Status: DISCONTINUED | OUTPATIENT
Start: 2023-10-02 | End: 2023-10-03

## 2023-10-02 RX ORDER — CARVEDILOL PHOSPHATE 80 MG/1
12.5 CAPSULE, EXTENDED RELEASE ORAL EVERY 12 HOURS
Refills: 0 | Status: DISCONTINUED | OUTPATIENT
Start: 2023-10-02 | End: 2023-10-03

## 2023-10-02 RX ORDER — LISINOPRIL 2.5 MG/1
20 TABLET ORAL DAILY
Refills: 0 | Status: DISCONTINUED | OUTPATIENT
Start: 2023-10-02 | End: 2023-10-03

## 2023-10-02 RX ORDER — INSULIN LISPRO 100/ML
VIAL (ML) SUBCUTANEOUS AT BEDTIME
Refills: 0 | Status: DISCONTINUED | OUTPATIENT
Start: 2023-10-02 | End: 2023-10-03

## 2023-10-02 RX ORDER — DEXTROSE 50 % IN WATER 50 %
25 SYRINGE (ML) INTRAVENOUS ONCE
Refills: 0 | Status: COMPLETED | OUTPATIENT
Start: 2023-10-02 | End: 2023-10-02

## 2023-10-02 RX ADMIN — CARVEDILOL PHOSPHATE 12.5 MILLIGRAM(S): 80 CAPSULE, EXTENDED RELEASE ORAL at 17:57

## 2023-10-02 RX ADMIN — LISINOPRIL 20 MILLIGRAM(S): 2.5 TABLET ORAL at 08:16

## 2023-10-02 RX ADMIN — Medication 25 GRAM(S): at 00:26

## 2023-10-02 RX ADMIN — HEPARIN SODIUM 5000 UNIT(S): 5000 INJECTION INTRAVENOUS; SUBCUTANEOUS at 14:07

## 2023-10-02 RX ADMIN — HEPARIN SODIUM 5000 UNIT(S): 5000 INJECTION INTRAVENOUS; SUBCUTANEOUS at 23:43

## 2023-10-02 RX ADMIN — Medication 60 MILLIGRAM(S): at 16:03

## 2023-10-02 RX ADMIN — Medication 50 MILLIGRAM(S): at 06:43

## 2023-10-02 RX ADMIN — Medication 50 MILLIGRAM(S): at 23:43

## 2023-10-02 RX ADMIN — INSULIN HUMAN 5 UNIT(S): 100 INJECTION, SOLUTION SUBCUTANEOUS at 00:25

## 2023-10-02 NOTE — CONSULT NOTE ADULT - ATTENDING COMMENTS
52 year old male with hx of ESRD (HD MWF), CHF, cirrhosis presents with chest pressure. In the ED patient found to be bradycardic to 30s, with blood gas revealing for K 7.4, without significant acidosis. Troponin 449 with EKG showing sinus bradycardia, no peaked t waves. Received insulin/dextrose and calcium gluconate with improvement in HR to 50s-60s as well as resolution of symptoms. ER team discussed with nephrology, patient scheduled for urgent HD.    On exam O2 sat maintained >95% on NC 5 L, HR 60s, normotensive. Non-labored breathing. Edematous b/l LE    Labs and imaging reviewed as above    Impression:    1. Symptomatic bradycardia, likely hyperkalemia-induced, resolved  2. Acute hypoxic respiratory failure  2. Hyperkalemia  3. ESRD on HD    Recommendations:  - urgent dialysis for hyperkalemia  - obtain post HD BMP  - trend troponin, EKG  - f/u nephrology for additional HD as needed  - telemetry monitoring    Patient does not require ICU level of care at this time.

## 2023-10-02 NOTE — H&P ADULT - HISTORY OF PRESENT ILLNESS
Patient is a 52 year old male from home, ambulates independently at baseline, A&OX3, with PMH of HTN, DM, anemia, ESRD on HD (M/W/F), CHF, cirrhosis, and chronic recurrent ascites (goes to [facility] radiology once ever 3 weeks to get it drained) presented to ED with chest pressure starting today. Patient states he was resting, when he had sudden onset of chest pressure, associated with lightheadedness and felt like he was going to pass out. Patient states the this lasted about an hour and has since resolved. Patient states he has been having generalized weakness for one day. Patient denies any fever, chills, nausea chest pain, SOB, abdominal pain, or change in bowel habits. Patient's last HD session was on Friday.  Patient last Ascites drained Thursday 9/22 when they removed 4.5L.

## 2023-10-02 NOTE — H&P ADULT - PROBLEM SELECTOR PLAN 4
- Patient with history of DM  - Patient on Glipizide 10mg at home  - Hold Home antidiabetics  - Start Insulin sliding scale  - Finger sticks ACHS  - Diabetic diet

## 2023-10-02 NOTE — ED PROVIDER NOTE - CARE PLAN
Principal Discharge DX:	Hyperkalemia   1 Principal Discharge DX:	Hyperkalemia  Secondary Diagnosis:	Bradycardia  Secondary Diagnosis:	Elevated troponin level

## 2023-10-02 NOTE — H&P ADULT - ATTENDING COMMENTS
Vital Signs Last 24 Hrs  T(C): 36.7 (01 Oct 2023 22:41), Max: 36.7 (01 Oct 2023 22:41)  T(F): 98 (01 Oct 2023 22:41), Max: 98 (01 Oct 2023 22:41)  HR: 67 (01 Oct 2023 23:52) (34 - 67)  BP: 126/72 (01 Oct 2023 23:52) (95/58 - 126/72)  BP(mean): 77 (01 Oct 2023 23:22) (77 - 77)  RR: 22 (01 Oct 2023 22:41) (22 - 22)  SpO2: 100% (01 Oct 2023 23:52) (100% - 100%)    Parameters below as of 01 Oct 2023 23:21  Patient On (Oxygen Delivery Method): mask, nonrebreather    Labs  hgb - 11.3  wbc 8.4 with eosinophilia    K - 7.4  BUN -73 / Cr 8.66  Glu - 234  Alb - 3.1  Phos - 6  Lactate - 2.1  Trop - 449    CXR reviewed  Appears unremarkable based on independent  review    Impression   - Severe symptomatic hyperkalemia  - Chest pain with elevated troponin-> r/o ACS   - Symptomatic  bradycardia induced by hyperkalemia  - Acute respiratory failure with hypoxia  - ESRD on HD with hyperphosphatemia    Plan   - s/p emergent management of hyperkalemia in the ED   with clinical improvement in presenting symptoms   - s/p ICU  and nephrology evaluation with plans for emergent HD for symptomatic hyperkalemia   - Admit to tele   - Serial troponin/EKG  - Supplemental O2 - wean off as able  - HD asap  - Close monitoring; improvement in vital signs   - post HD labs Vital Signs Last 24 Hrs  T(C): 36.7 (01 Oct 2023 22:41), Max: 36.7 (01 Oct 2023 22:41)  T(F): 98 (01 Oct 2023 22:41), Max: 98 (01 Oct 2023 22:41)  HR: 67 (01 Oct 2023 23:52) (34 - 67)  BP: 126/72 (01 Oct 2023 23:52) (95/58 - 126/72)  BP(mean): 77 (01 Oct 2023 23:22) (77 - 77)  RR: 22 (01 Oct 2023 22:41) (22 - 22)  SpO2: 100% (01 Oct 2023 23:52) (100% - 100%)    Parameters below as of 01 Oct 2023 23:21  Patient On (Oxygen Delivery Method): mask, nonrebreather    Labs  hgb - 11.3  wbc 8.4 with eosinophilia    K - 7.4  BUN -73 / Cr 8.66  Glu - 234  Alb - 3.1  Phos - 6  Lactate - 2.1  Trop - 449    CXR reviewed  Appears unremarkable based on independent  review    Impression   - Severe symptomatic hyperkalemia  - Chest pain with elevated troponin-> r/o ACS   - Symptomatic  bradycardia induced by hyperkalemia  - Acute respiratory failure with hypoxia  - ESRD on HD with hyperphosphatemia  - Chronic liver disease -decompensated- no active issue    Plan   - s/p emergent management of hyperkalemia in the ED   with clinical improvement in presenting symptoms   - s/p ICU  and nephrology evaluation with plans for emergent HD for symptomatic hyperkalemia   - Admit to tele   - Serial troponin/EKG  - Supplemental O2 - wean off  - HD asap  - Close monitoring; improvement in vital signs   - post HD labs

## 2023-10-02 NOTE — ED PROVIDER NOTE - OBJECTIVE STATEMENT
53 yo M PMHx of ESRD on dialysis MWF (last dialysis Friday) presents with chest pressure since this evening. Started when he was watching TV. Accompanied by lightheadedness, shortness of breath, severe chest discomfort. Patient arrived to ER bradycardic to the 30s. Received full session of dialysis Friday at [facility].

## 2023-10-02 NOTE — ED PROVIDER NOTE - PHYSICAL EXAMINATION
GENERAL: no acute distress, non-toxic appearing  HEAD: normocephalic, atraumatic  HEENT: normal conjunctiva, oral mucosa moist, neck supple  CARDIAC: +bradycardic, normal S1 and S2,  no appreciable murmurs  PULM: +crackles bilaterally  GI: abdomen nondistended, soft, nontender, no guarding or rebound tenderness  NEURO: alert and oriented x 3, normal speech, moving all extremities   MSK: +RUE fistula with palpable thrill, +LE pitting edema 2+, no visible deformities, calf tenderness/redness/swelling  SKIN: no visible rashes, dry, well-perfused  PSYCH: appropriate mood and affect

## 2023-10-02 NOTE — H&P ADULT - PROBLEM SELECTOR PLAN 2
- Patient with lightheadness and chest pressure at home  - Patient asymptomatic on exam  - Patient HR in 60s on exam  - Patient with bradycardia to 30s in ED  - Patients EKG showing Junctional bradycardia, HR 33,   - Likely 2/2 hyperkalemia

## 2023-10-02 NOTE — PATIENT PROFILE ADULT - FALL HARM RISK - HARM RISK INTERVENTIONS
Assistance with ambulation/Assistance OOB with selected safe patient handling equipment/Communicate Risk of Fall with Harm to all staff/Reinforce activity limits and safety measures with patient and family/Review medications for side effects contributing to fall risk/Sit up slowly, dangle for a short time, stand at bedside before walking/Tailored Fall Risk Interventions/Toileting schedule using arm’s reach rule for commode and bathroom/Visual Cue: Yellow wristband and red socks/Bed in lowest position, wheels locked, appropriate side rails in place/Call bell, personal items and telephone in reach/Instruct patient to call for assistance before getting out of bed or chair/Non-slip footwear when patient is out of bed/[place] to call system/Physically safe environment - no spills, clutter or unnecessary equipment/Purposeful Proactive Rounding/Room/bathroom lighting operational, light cord in reach

## 2023-10-02 NOTE — CONSULT NOTE ADULT - ASSESSMENT
=================== Neuro============================      ================= Cardiovascular==========================        ================- Pulm=================================      ==================ID===================================      ================= Nephro================================      =================GI====================================      ================ Heme==================================      =================Endocrine===============================      ================= Skin/Catheters============================  Peripheral IV lines   Vigil catheter   Patient consented for A line and CVC     =================Prophylaxis =============================  DVT prophylaxis   GI prophylaxis     ==================GOC==================================  FULL CODE   Disposition       52 year old male with hx of ESRD (HD MWF), CHF, cirrhosis presents with chest pressure. Patient was found to be hyperkalemic  and bradycardic. Patient was seen and examined at bedside, in NAD with resolution of sxs. BP remained stable 119/60 and HR was 60bpm. EKG showed sinus james, with no evidence of peaked T waves. Nephrology was consulted and patient will get HD through left AVF tonight. No indication for ICU at this time, can reconsult as needed. Patient can be admitted to telemetry for electrolyte abnormalities     #Bradycardia  # Hyperkalemia  # ESRD    =================== Neuro============================    #No issues     ================= Cardiovascular==========================  #Bradycardia  p/w lightheadedness w/ HR 37  Now asymptomatic w/ HR 60s, hemodynamically stable   EKG showed sinus james  trop 449  likely 2/2 hyperkalemia  trend trop     #HTN  on lisinopril, nifedipine and coreq at home  can hold for now as BP on softer side      ================- Pulm=================================  #Acute Hypoxic Resp Failure   hx of ESRD on HD, last session Friday  p/w dyspnea  CXR shows mild vascular congestion  titrated off NRB to 5L NC saturating 100%  HD session today  titrate O2 as tolerated       ==================ID===================================  #no issues     ================= Nephro================================  ESRD  hx of ESRD on HD, last session Friday  p/w dyspnea  CXR shows mild vascular congestion  titrated off NRB to 5L NC saturating 100%  HD session today    Hyperkalemia  K 7.4  s/p insulin + dextrose  HD session now  f/u post HD labs  admit to tele       =================GI====================================  No issues  ================ Heme==================================  No issues     =================Endocrine===============================  No issues     ================= Skin/Catheters============================  Peripheral IV lines        =================Prophylaxis =============================  DVT prophylaxis : hep Sub Q  GI prophylaxis     ==================GOC==================================  FULL CODE   Disposition       52 year old male with hx of ESRD (HD MWF), CHF, cirrhosis presents with chest pressure. Patient was found to be hyperkalemic  and bradycardic. Patient was seen and examined at bedside, in NAD with resolution of sxs. BP remained stable 119/60 and HR was 60bpm. EKG showed sinus james, with no evidence of peaked T waves. Nephrology was consulted and patient will get HD through left AVF tonight. No indication for ICU at this time, can reconsult as needed. Patient can be admitted to telemetry for electrolyte abnormalities     #Bradycardia  # Hyperkalemia  # ESRD    =================== Neuro============================    #No issues     ================= Cardiovascular==========================  #Bradycardia  p/w lightheadedness w/ HR 37  Now asymptomatic w/ HR 60s, hemodynamically stable   EKG showed sinus james  trop 449  likely 2/2 hyperkalemia  trend trop     #HTN  on lisinopril, nifedipine and coreq at home  can hold for now as BP on softer side      ================- Pulm=================================  #Acute Hypoxic Resp Failure   hx of ESRD on HD, last session Friday  p/w dyspnea  CXR shows mild vascular congestion  titrated off NRB to 5L NC saturating 100%  HD session today  titrate O2 as tolerated       ==================ID===================================  #no issues     ================= Nephro================================  ESRD  hx of ESRD on HD, last session Friday  p/w dyspnea  CXR shows mild vascular congestion  titrated off NRB to 5L NC saturating 100%  HD session today    Hyperkalemia  K 7.4  s/p insulin + dextrose  HD session now  f/u post HD labs  admit to tele       =================GI====================================  No issues  ================ Heme==================================  No issues     =================Endocrine===============================  No issues     ================= Skin/Catheters============================  Peripheral IV lines        =================Prophylaxis =============================  DVT prophylaxis : hep Sub Q  GI prophylaxis     ==================GOC==================================  FULL CODE   Disposition : Telemetry

## 2023-10-02 NOTE — H&P ADULT - NSHPPHYSICALEXAM_GEN_ALL_CORE
HEAD:  Atraumatic, Normocephalic  EYES:  conjunctiva and sclera clear  NECK: Supple, No JVD, Normal thyroid  NERVOUS SYSTEM:  Alert & Oriented, moving all ext  CHEST/LUNG: decrease BS b/l; No rales, rhonchi, wheezing, or rubs  HEART: Regular rate and rhythm; No murmurs, rubs, or gallops  ABDOMEN: Soft, mild generalized tenderness   EXTREMITIES:  +2 BLE edema. left AVF w/ thrill   LYMPH: No lymphadenopathy noted  SKIN: No rashes or lesions

## 2023-10-02 NOTE — H&P ADULT - ASSESSMENT
Patient is a 52 year old male from home, ambulates independently at baseline, A&OX3, with PMH of HTN, DM, anemia, ESRD on HD (M/W/F), CHF, cirrhosis, and chronic recurrent ascites (goes to [facility] radiology once ever 3 weeks to get it drained) presented to ED with chest pressure starting today.  Patient was found to have elevated K to 7 and bradycardia down to 33 with junctional bradycardia on EKG.  ICU was consulted for bradycardia and hyperkalemia however patient did not require ICU at this time.  patient was treated with Insulin and dextrose to lower K and his potassium was found to be 5.4 on repeat.  Nephrology was consulted for urgent HD session which was indicated and patient had Urgent HD session overnight without fluid removal as per Nephrologist recommendations.  Patient will likely require repeat HD session for fluid removal.  Patient is being admitted to tele for hyperkalemia, bradycardia, and elevated trops to 449.1.

## 2023-10-02 NOTE — H&P ADULT - PROBLEM SELECTOR PLAN 1
- Patient with elevated K on arrival to 7.4  - Patient with Chest pressure   - Patient bradycardic in ED  - Patient with EKG showing junctional bradycardia  - Patient received insulin and dextrose in ED for Hyperkalemia  - Repeat K found to be 5.4  - ICU consulted for hyperkalemia, no ICU indication at this time  - Patient last HD Session was on Friday 9/30  - Patient denies missing HD session  - Patient had Urgent HD session overnight per nephrology recommendations  - F/U Post HD labs  - Admit to tele   - Monitor CMP closely  - Nephrology Consulted Dr. Weems

## 2023-10-02 NOTE — H&P ADULT - PROBLEM SELECTOR PLAN 5
- Patient with recurrent episodes of ascites requiring drainage every 3 weeks  - - Patient with recurrent episodes of ascites requiring drainage every 3 weeks  - Patients last drainage on 9/22 with 4.5L removed  - Patient with mild ascites on exam  - Patient with history of Cirrhosis

## 2023-10-02 NOTE — ED PROVIDER NOTE - ATTENDING CONTRIBUTION TO CARE
The resident's documentation has been prepared under my direct supervision and personally reviewed by me in its entirety. I confirm that the note above accurately reflects all work, treatment, procedures, and medical decision making performed by me. I was present at all time during patient encounter.  I examined patient independently-patient with hx ESRD on HD-last HD 2 days ago, CHF, HTN presents with 1 hr of chest pressure/dyspnea and lightheadedness, in ED patient Pacer pads placed, has severe junctional bradycardia HR 33 on EKG, CXR shows no pulmonary edema. Given hyperkalemia cocktail for suspected hyperkalemia with improvement of HR 33->50-60s and improvement of hypotension, nephrology emergently consulted for emergent dialysis, ICU consulted who recommends HD and medical admission-no need for ICU level care at this time. Patient stable for admission for emergent HD. (Yuval NICHOLS)

## 2023-10-02 NOTE — ED PROVIDER NOTE - CLINICAL SUMMARY MEDICAL DECISION MAKING FREE TEXT BOX
53 yo M PMHx of ESRD on dialysis MWF last done on Friday presents with bradycardia to 30s, BP 90s, patient symptomatic with chest pressure, lightheadedness - patient given hyperkalemia cocktail (insulin/D50, bicarbonate, calcium) with improving HR, BP. Arranged for emergent dialysis with nephrology. ICU consulted.

## 2023-10-02 NOTE — H&P ADULT - PROBLEM SELECTOR PLAN 7
Discussed results with patient. Patient verbalized understanding.
- Patient with recurrent episodes of ascites requiring drainage every 3 weeks  - Patients last drainage on 9/22 with 4.5L removed  - Patient with mild ascites on exam  - Patient with history of Cirrhosis  - Patient LFTs elevated

## 2023-10-02 NOTE — H&P ADULT - PROBLEM SELECTOR PLAN 6
- Patient with history of CHF  - Patient on Lisinopril and Carvedilol at home  - Continue home meds with holding parameters

## 2023-10-02 NOTE — CONSULT NOTE ADULT - ASSESSMENT
Patient is a 53yo Male with ESRD on HD (MWF), CHF, Cirrhosis requiring recurrent paracentesis (last performed 1.5 wks ago), HTN, DM,  presents with chest pressure. Pt found to be hyperkalemic requiring urgent HD O/N. Nephrology consulted for ESRD status.    1) ESRD: Last HD early this am (10/2), tolerated well with net 0.5L removed. Pt follows with Nephrologist Dr. Franco, who will resume care tomorrow 10/3 and assess if pt needs additional HD. Recc 1L fluid restriction/day. Monitor electrolytes.  2) HTN with ESRD: BP elevated. c/w Lisinopril 20mg PO daily. Pt states he also takes Nifedipine ER 60mg PO daily and Coreg 12.5mg PO bid. Will resume Nifedipine at this time and can resume Coreg if no further episodes of bradycardia. c/w  low sodium diet. Monitor BP.  3) Hyperkalemia- resolved with HD. c/w low potassium diet. Monitor serum potassium  4) Anemia of renal disease: Hb acceptable. Can resume outpt [facility]RISTIANE. Monitor Hb.    [facility] NEPHROLOGY  Rashad Johns M.D.  Félix Montoya D.O.  Bobbi Esparza M.D.  MD Tarah Bowser, MSN, ANP-C    Telephone: [phone]  Facsimile: [phone]    [facility] [place] [address], [address]  [place] [address]

## 2023-10-02 NOTE — CONSULT NOTE ADULT - SUBJECTIVE AND OBJECTIVE BOX
Patient is a 52y old  Male who presents with a chief complaint of chest pressure    HPI: 52 year old male with hx of ESRD (HD MWF), CHF, cirrhosis presents with chest pressure. Patient states that while he was resting, he had sudden onset of chest pressure, associated with lightheadedness and felt like he was going to pass out. Patient states the sxs lasted for an hour and has now resolved. Patient states prior to this episode he has been having generalized weakness for one day. Currently denies any fever, chills, nausea chest pain, SOB, abdominal pain, or change in bowel habits. Patient's last HD session was on Friday.    In the ED:  Afebrile, HR 35-47, /60, 100% on 5L NC  K 7.4, Trop 449  EKG showed   s/p insulin/dextrose + Ca Gluconate       Allergies    No Known Allergies    Intolerances        MEDICATIONS  (STANDING):  lisinopril 20 milliGRAM(s) Oral daily  mupirocin 2% Ointment 1 Application(s) Both Nostrils two times a day    MEDICATIONS  (PRN):  sodium chloride 0.9% Bolus. 100 milliLiter(s) IV Bolus every 5 minutes PRN SBP LESS THAN or EQUAL to 100 mmHg      Daily Height in cm: 180.34 (01 Oct 2023 22:41)    Daily     Drug Dosing Weight  Height (cm): 180.3 (01 Oct 2023 22:41)  Weight (kg): 93 (01 Oct 2023 22:41)  BMI (kg/m2): 28.6 (01 Oct 2023 22:41)  BSA (m2): 2.13 (01 Oct 2023 22:41)    PAST MEDICAL & SURGICAL HISTORY:  Type 2 diabetes mellitus      Hypertension      End stage renal disease  started HD 2/2019 T, Th, Sat via right chest permacath      Bone spur  right shoulder- hx of - sx done      Anemia      Injury of right wrist  hx of at age 14      History of hyperkalemia  before HD- K-6.2 - to repeat in am of sx, pt had HD today      S/P arthroscopy of right shoulder  2010      History of vascular access device  right chest permacath - 2/2019      H/O right wrist surgery  tendons repair - at age 14      AV fistula          FAMILY HISTORY:  FH: hypertension  mother, father- alive    FH: type 2 diabetes  mother, father- alive        SOCIAL HISTORY:    ADVANCE DIRECTIVES:    REVIEW OF SYSTEMS:    CONSTITUTIONAL: No fever, weight loss, or fatigue  EYES: No eye pain, visual disturbances, or discharge  ENMT:  No difficulty hearing, tinnitus, vertigo; No sinus or throat pain  NECK: No pain or stiffness  BREASTS: No pain, masses, or nipple discharge  RESPIRATORY: No cough, wheezing, chills or hemoptysis; No shortness of breath  CARDIOVASCULAR: No chest pain, palpitations, dizziness, or leg swelling  GASTROINTESTINAL: No abdominal or epigastric pain. No nausea, vomiting, or hematemesis; No diarrhea or constipation. No melena or hematochezia.  GENITOURINARY: No dysuria, frequency, hematuria, or incontinence  NEUROLOGICAL: No headaches, memory loss, loss of strength, numbness, or tremors  SKIN: No itching, burning, rashes, or lesions   LYMPH NODES: No enlarged glands  ENDOCRINE: No heat or cold intolerance; No hair loss  MUSCULOSKELETAL: No joint pain or swelling; No muscle, back, or extremity pain  PSYCHIATRIC: No depression, anxiety, mood swings, or difficulty sleeping  HEME/LYMPH: No easy bruising, or bleeding gums  ALLERGY AND IMMUNOLOGIC: No hives or eczema          ICU Vital Signs Last 24 Hrs  T(C): 36.7 (01 Oct 2023 22:41), Max: 36.7 (01 Oct 2023 22:41)  T(F): 98 (01 Oct 2023 22:41), Max: 98 (01 Oct 2023 22:41)  HR: 67 (01 Oct 2023 23:52) (34 - 67)  BP: 126/72 (01 Oct 2023 23:52) (95/58 - 126/72)  BP(mean): 77 (01 Oct 2023 23:22) (77 - 77)  ABP: --  ABP(mean): --  RR: 22 (01 Oct 2023 22:41) (22 - 22)  SpO2: 100% (01 Oct 2023 23:52) (100% - 100%)    O2 Parameters below as of 01 Oct 2023 23:21  Patient On (Oxygen Delivery Method): mask, nonrebreather                I&O's Detail      PHYSICAL EXAM:    GENERAL: NAD, well-groomed, well-developed  HEAD:  Atraumatic, Normocephalic  EYES: EOMI, PERRLA, conjunctiva and sclera clear  ENMT: No tonsillar erythema, exudates, or enlargement; Moist mucous membranes, Good dentition, No lesions  NECK: Supple, No JVD, Normal thyroid  NERVOUS SYSTEM:  Alert & Oriented X3, Good concentration; Motor Strength 5/5 B/L upper and lower extremities; DTRs 2+ intact and symmetric  CHEST/LUNG: Clear to percussion bilaterally; No rales, rhonchi, wheezing, or rubs  HEART: Regular rate and rhythm; No murmurs, rubs, or gallops  ABDOMEN: Soft, Nontender, Nondistended; Bowel sounds present  EXTREMITIES:  2+ Peripheral Pulses, No clubbing, cyanosis, or edema  LYMPH: No lymphadenopathy noted  SKIN: No rashes or lesions    LABS:  CBC Full  -  ( 01 Oct 2023 23:00 )  WBC Count : 8.41 K/uL  RBC Count : 3.60 M/uL  Hemoglobin : 11.3 g/dL  Hematocrit : 35.6 %  Platelet Count - Automated : 210 K/uL  Mean Cell Volume : 98.9 fl  Mean Cell Hemoglobin : 31.4 pg  Mean Cell Hemoglobin Concentration : 31.7 gm/dL  Auto Neutrophil # : 5.70 K/uL  Auto Lymphocyte # : 0.82 K/uL  Auto Monocyte # : 0.78 K/uL  Auto Eosinophil # : 1.01 K/uL  Auto Basophil # : 0.07 K/uL  Auto Neutrophil % : 67.7 %  Auto Lymphocyte % : 9.8 %  Auto Monocyte % : 9.3 %  Auto Eosinophil % : 12.0 %  Auto Basophil % : 0.8 %    10-01    136  |  103  |  73<H>  ----------------------------<  234<H>  7.4<HH>   |  26  |  8.66<H>    Ca    8.4      01 Oct 2023 23:00  Phos  6.0     10-01  Mg     2.3     10-01    TPro  7.3  /  Alb  3.1<L>  /  TBili  0.5  /  DBili  x   /  AST  24  /  ALT  17  /  AlkPhos  93  10-01    CAPILLARY BLOOD GLUCOSE      POCT Blood Glucose.: 196 mg/dL (01 Oct 2023 22:44)      Urinalysis Basic - ( 01 Oct 2023 23:00 )    Color: x / Appearance: x / SG: x / pH: x  Gluc: 234 mg/dL / Ketone: x  / Bili: x / Urobili: x   Blood: x / Protein: x / Nitrite: x   Leuk Esterase: x / RBC: x / WBC x   Sq Epi: x / Non Sq Epi: x / Bacteria: x              EKG:    ECHO, US:    RADIOLOGY:    CRITICAL CARE TIME SPENT:   Patient is a 52y old  Male who presents with a chief complaint of chest pressure    HPI: 52 year old male with hx of ESRD (HD MWF), CHF, cirrhosis presents with chest pressure. Patient states that while he was resting, he had sudden onset of chest pressure, associated with lightheadedness and felt like he was going to pass out. Patient states the sxs lasted for an hour and has now resolved. Patient states prior to this episode he has been having generalized weakness for one day. Currently denies any fever, chills, nausea chest pain, SOB, abdominal pain, or change in bowel habits. Patient's last HD session was on Friday.    In the ED:  Afebrile, HR 35-47, /60, 100% on 5L NC  K 7.4, Trop 449  EKG showed sinus james  s/p insulin/dextrose + Ca Gluconate       Allergies    No Known Allergies    Intolerances        MEDICATIONS  (STANDING):  lisinopril 20 milliGRAM(s) Oral daily  mupirocin 2% Ointment 1 Application(s) Both Nostrils two times a day    MEDICATIONS  (PRN):  sodium chloride 0.9% Bolus. 100 milliLiter(s) IV Bolus every 5 minutes PRN SBP LESS THAN or EQUAL to 100 mmHg      Daily Height in cm: 180.34 (01 Oct 2023 22:41)    Daily     Drug Dosing Weight  Height (cm): 180.3 (01 Oct 2023 22:41)  Weight (kg): 93 (01 Oct 2023 22:41)  BMI (kg/m2): 28.6 (01 Oct 2023 22:41)  BSA (m2): 2.13 (01 Oct 2023 22:41)    PAST MEDICAL & SURGICAL HISTORY:  Type 2 diabetes mellitus      Hypertension      End stage renal disease  started HD 2/2019 T, Th, Sat via right chest permacath      Bone spur  right shoulder- hx of - sx done      Anemia      Injury of right wrist  hx of at age 14      History of hyperkalemia  before HD- K-6.2 - to repeat in am of sx, pt had HD today      S/P arthroscopy of right shoulder  2010      History of vascular access device  right chest permacath - 2/2019      H/O right wrist surgery  tendons repair - at age 14      AV fistula          FAMILY HISTORY:  FH: hypertension  mother, father- alive    FH: type 2 diabetes  mother, father- alive        SOCIAL HISTORY:    ADVANCE DIRECTIVES:    REVIEW OF SYSTEMS:      CONSTITUTIONAL: + weakness, no fevers or chills  EYES/ENT: No visual changes;  No vertigo or throat pain   NECK: No pain or stiffness  RESPIRATORY: No cough, wheezing, hemoptysis; + shortness of breath  CARDIOVASCULAR: No chest pain or palpitations  GASTROINTESTINAL: No abdominal or epigastric pain. No nausea, vomiting, or hematemesis; No diarrhea or constipation. No melena or hematochezia.  GENITOURINARY: No dysuria, frequency or hematuria  NEUROLOGICAL: No numbness or weakness  SKIN: No itching, burning, rashes, or lesions   All other review of systems is negative unless indicated above.        ICU Vital Signs Last 24 Hrs  T(C): 36.7 (01 Oct 2023 22:41), Max: 36.7 (01 Oct 2023 22:41)  T(F): 98 (01 Oct 2023 22:41), Max: 98 (01 Oct 2023 22:41)  HR: 67 (01 Oct 2023 23:52) (34 - 67)  BP: 126/72 (01 Oct 2023 23:52) (95/58 - 126/72)  BP(mean): 77 (01 Oct 2023 23:22) (77 - 77)  ABP: --  ABP(mean): --  RR: 22 (01 Oct 2023 22:41) (22 - 22)  SpO2: 100% (01 Oct 2023 23:52) (100% - 100%)    O2 Parameters below as of 01 Oct 2023 23:21  Patient On (Oxygen Delivery Method): mask, nonrebreather                I&O's Detail      PHYSICAL EXAM:    GENERAL: NAD,  HEAD:  Atraumatic, Normocephalic  EYES:  conjunctiva and sclera clear  NECK: Supple, No JVD, Normal thyroid  NERVOUS SYSTEM:  Alert & Oriented, moving all ext  CHEST/LUNG: decrease BS b/l; No rales, rhonchi, wheezing, or rubs  HEART: Regular rate and rhythm; No murmurs, rubs, or gallops  ABDOMEN: Soft, mild generalized tenderness   EXTREMITIES:  +2 BLE edema. left AVF w/ thrill   LYMPH: No lymphadenopathy noted  SKIN: No rashes or lesions    LABS:  CBC Full  -  ( 01 Oct 2023 23:00 )  WBC Count : 8.41 K/uL  RBC Count : 3.60 M/uL  Hemoglobin : 11.3 g/dL  Hematocrit : 35.6 %  Platelet Count - Automated : 210 K/uL  Mean Cell Volume : 98.9 fl  Mean Cell Hemoglobin : 31.4 pg  Mean Cell Hemoglobin Concentration : 31.7 gm/dL  Auto Neutrophil # : 5.70 K/uL  Auto Lymphocyte # : 0.82 K/uL  Auto Monocyte # : 0.78 K/uL  Auto Eosinophil # : 1.01 K/uL  Auto Basophil # : 0.07 K/uL  Auto Neutrophil % : 67.7 %  Auto Lymphocyte % : 9.8 %  Auto Monocyte % : 9.3 %  Auto Eosinophil % : 12.0 %  Auto Basophil % : 0.8 %    10-01    136  |  103  |  73<H>  ----------------------------<  234<H>  7.4<HH>   |  26  |  8.66<H>    Ca    8.4      01 Oct 2023 23:00  Phos  6.0     10-01  Mg     2.3     10-01    TPro  7.3  /  Alb  3.1<L>  /  TBili  0.5  /  DBili  x   /  AST  24  /  ALT  17  /  AlkPhos  93  10-01    CAPILLARY BLOOD GLUCOSE      POCT Blood Glucose.: 196 mg/dL (01 Oct 2023 22:44)      Urinalysis Basic - ( 01 Oct 2023 23:00 )    Color: x / Appearance: x / SG: x / pH: x  Gluc: 234 mg/dL / Ketone: x  / Bili: x / Urobili: x   Blood: x / Protein: x / Nitrite: x   Leuk Esterase: x / RBC: x / WBC x   Sq Epi: x / Non Sq Epi: x / Bacteria: x              EKG:    ECHO, US:    RADIOLOGY:    CRITICAL CARE TIME SPENT:

## 2023-10-02 NOTE — CONSULT NOTE ADULT - SUBJECTIVE AND OBJECTIVE BOX
[facility] NEPHROLOGY- CONSULTATION NOTE    Patient is a 51yo Male with ESRD on HD (MWF), CHF, Cirrhosis requiring recurrent paracentesis (last performed 1.5 wks ago), HTN, DM,  presents with chest pressure. Pt found to have symptomatic hyperkalemia with bradycardia requiring urgent HD O/N. Nephrology consulted for ESRD status.    Pt with ESRD on HD, MWF @ [facility] (Nephrologist Dr. Domenic Franco). Last outpt HD 9/29  Pt denies any SOB, current chest pain, n/v/d, or abd pain +r LE edema.      PAST MEDICAL & SURGICAL HISTORY:  Type 2 diabetes mellitus      Hypertension      End stage renal disease  started HD 2/2019 T, Th, Sat via right chest permacath      Bone spur  right shoulder- hx of - sx done      Anemia      Injury of right wrist  hx of at age 14      History of hyperkalemia  before HD- K-6.2 - to repeat in am of sx, pt had HD today      S/P arthroscopy of right shoulder  2010      History of vascular access device  right chest permacath - 2/2019      H/O right wrist surgery  tendons repair - at age 14      AV fistula        No Known Allergies    Home Medications Reviewed  Hospital Medications:   MEDICATIONS  (STANDING):  heparin   Injectable 5000 Unit(s) SubCutaneous every 8 hours  insulin lispro (ADMELOG) corrective regimen sliding scale   SubCutaneous three times a day before meals  insulin lispro (ADMELOG) corrective regimen sliding scale   SubCutaneous at bedtime  lisinopril 20 milliGRAM(s) Oral daily  mupirocin 2% Ointment 1 Application(s) Both Nostrils two times a day  traZODone 50 milliGRAM(s) Oral at bedtime    SOCIAL HISTORY: +smoker 1-2 ciggs/day  Denies ETOh, or drug use  FAMILY HISTORY:  FH: hypertension  mother, father- alive    FH: type 2 diabetes  mother, father- alive        REVIEW OF SYSTEMS:  Gen: no changes in weight  HEENT: no rhinorrhea  Neck: no sore throat  Cards: no chest pain  Resp: no dyspnea  GI: no nausea or vomiting or diarrhea  : no dysuria or hematuria  Vascular: +LE edema  Derm: no rashes  Neuro: no numbness/tingling  All other review of systems is negative unless indicated above.    VITALS:  T(F): 97.9 (10-02-23 @ 11:40), Max: 98.5 (10-02-23 @ 07:30)  HR: 69 (10-02-23 @ 11:40)  BP: 177/85 (10-02-23 @ 11:40)  RR: 18 (10-02-23 @ 11:40)  SpO2: 96% (10-02-23 @ 11:40)  Wt(kg): --    Height (cm): 180.3 (10-01 @ 22:41)  Weight (kg): 93 (10-01 @ 22:41)  BMI (kg/m2): 28.6 (10-01 @ 22:41)  BSA (m2): 2.13 (10-01 @ 22:41)    PHYSICAL EXAM:  Gen: NAD, calm  HEENT: MMM  Neck: no JVD  Cards: RRR, +S1/S2, no M/G/R  Resp: mild left basilar rales; clear right side  GI: soft, NT +mild distention  : no CVA tenderness  Extremities: +trace LE edema B/L  Derm: no rashes  Neuro: non-focal  Access: Left AVF +thrill +bruit    LABS:  10-02    137  |  104  |  44<H>  ----------------------------<  85  4.1   |  30  |  5.00<H>    Ca    8.2<L>      02 Oct 2023 06:25  Phos  3.3     10-02  Mg     2.1     10-02    TPro  7.3  /  Alb  3.1<L>  /  TBili  0.5  /  DBili      /  AST  24  /  ALT  17  /  AlkPhos  93  10-01    Creatinine Trend: 5.00 <--, 8.85 <--, 8.66 <--                        10.4   6.84  )-----------( 183      ( 02 Oct 2023 09:57 )             32.0     Urine Studies:  Urinalysis Basic - ( 02 Oct 2023 06:25 )    Color:  / Appearance:  / SG:  / pH:   Gluc: 85 mg/dL / Ketone:   / Bili:  / Urobili:    Blood:  / Protein:  / Nitrite:    Leuk Esterase:  / RBC:  / WBC    Sq Epi:  / Non Sq Epi:  / Bacteria:         RADIOLOGY & ADDITIONAL STUDIES:      < from: Xray Chest 1 View- PORTABLE-Urgent (Xray Chest 1 View- PORTABLE-Urgent .) (10.02.23 @ 00:08) >    ACC: [id number] EXAM:  XR CHEST PORTABLE URGENT 1V   ORDERED BY: ROCKY GÓMEZ     PROCEDURE DATE:  10/02/2023          INTERPRETATION:  EXAM: XR CHEST URGENT    INDICATION: Admitting Dxs: E87.5 HYPERKALEMIA esrd bradycardic HXR    COMPARISON: None    IMPRESSION: No focal infiltrate or congestion. Heart is at the upper   limits of normal in its transthoracic diameter on this portable exam.   Regional osseous structures appropriate for age.    --- End of Report ---      < end of copied text >

## 2023-10-03 ENCOUNTER — TRANSCRIPTION ENCOUNTER (OUTPATIENT)
Age: 53
End: 2023-10-03

## 2023-10-03 VITALS
HEART RATE: 70 BPM | OXYGEN SATURATION: 98 % | SYSTOLIC BLOOD PRESSURE: 103 MMHG | TEMPERATURE: 98 F | DIASTOLIC BLOOD PRESSURE: 61 MMHG | RESPIRATION RATE: 17 BRPM

## 2023-10-03 LAB
ANION GAP SERPL CALC-SCNC: 9 MMOL/L — SIGNIFICANT CHANGE UP (ref 5–17)
BASOPHILS # BLD AUTO: 0.06 K/UL — SIGNIFICANT CHANGE UP (ref 0–0.2)
BASOPHILS NFR BLD AUTO: 0.9 % — SIGNIFICANT CHANGE UP (ref 0–2)
BUN SERPL-MCNC: 57 MG/DL — HIGH (ref 7–18)
CALCIUM SERPL-MCNC: 8.6 MG/DL — SIGNIFICANT CHANGE UP (ref 8.4–10.5)
CHLORIDE SERPL-SCNC: 102 MMOL/L — SIGNIFICANT CHANGE UP (ref 96–108)
CO2 SERPL-SCNC: 28 MMOL/L — SIGNIFICANT CHANGE UP (ref 22–31)
CREAT SERPL-MCNC: 7.89 MG/DL — HIGH (ref 0.5–1.3)
CULTURE RESULTS: SIGNIFICANT CHANGE UP
EGFR: 8 ML/MIN/1.73M2 — LOW
EOSINOPHIL # BLD AUTO: 1.15 K/UL — HIGH (ref 0–0.5)
EOSINOPHIL NFR BLD AUTO: 16.7 % — HIGH (ref 0–6)
GLUCOSE BLDC GLUCOMTR-MCNC: 88 MG/DL — SIGNIFICANT CHANGE UP (ref 70–99)
GLUCOSE SERPL-MCNC: 132 MG/DL — HIGH (ref 70–99)
HCT VFR BLD CALC: 36.3 % — LOW (ref 39–50)
HGB BLD-MCNC: 11.8 G/DL — LOW (ref 13–17)
IMM GRANULOCYTES NFR BLD AUTO: 0.3 % — SIGNIFICANT CHANGE UP (ref 0–0.9)
LYMPHOCYTES # BLD AUTO: 0.87 K/UL — LOW (ref 1–3.3)
LYMPHOCYTES # BLD AUTO: 12.6 % — LOW (ref 13–44)
MAGNESIUM SERPL-MCNC: 2.3 MG/DL — SIGNIFICANT CHANGE UP (ref 1.6–2.6)
MCHC RBC-ENTMCNC: 31.4 PG — SIGNIFICANT CHANGE UP (ref 27–34)
MCHC RBC-ENTMCNC: 32.5 GM/DL — SIGNIFICANT CHANGE UP (ref 32–36)
MCV RBC AUTO: 96.5 FL — SIGNIFICANT CHANGE UP (ref 80–100)
MONOCYTES # BLD AUTO: 0.65 K/UL — SIGNIFICANT CHANGE UP (ref 0–0.9)
MONOCYTES NFR BLD AUTO: 9.4 % — SIGNIFICANT CHANGE UP (ref 2–14)
NEUTROPHILS # BLD AUTO: 4.13 K/UL — SIGNIFICANT CHANGE UP (ref 1.8–7.4)
NEUTROPHILS NFR BLD AUTO: 60.1 % — SIGNIFICANT CHANGE UP (ref 43–77)
NRBC # BLD: 0 /100 WBCS — SIGNIFICANT CHANGE UP (ref 0–0)
PHOSPHATE SERPL-MCNC: 5 MG/DL — HIGH (ref 2.5–4.5)
PLATELET # BLD AUTO: 216 K/UL — SIGNIFICANT CHANGE UP (ref 150–400)
POTASSIUM SERPL-MCNC: 5.1 MMOL/L — SIGNIFICANT CHANGE UP (ref 3.5–5.3)
POTASSIUM SERPL-SCNC: 5.1 MMOL/L — SIGNIFICANT CHANGE UP (ref 3.5–5.3)
RBC # BLD: 3.76 M/UL — LOW (ref 4.2–5.8)
RBC # FLD: 14.6 % — HIGH (ref 10.3–14.5)
SODIUM SERPL-SCNC: 139 MMOL/L — SIGNIFICANT CHANGE UP (ref 135–145)
SPECIMEN SOURCE: SIGNIFICANT CHANGE UP
WBC # BLD: 6.88 K/UL — SIGNIFICANT CHANGE UP (ref 3.8–10.5)
WBC # FLD AUTO: 6.88 K/UL — SIGNIFICANT CHANGE UP (ref 3.8–10.5)

## 2023-10-03 PROCEDURE — 93005 ELECTROCARDIOGRAM TRACING: CPT

## 2023-10-03 PROCEDURE — 82803 BLOOD GASES ANY COMBINATION: CPT

## 2023-10-03 PROCEDURE — 71045 X-RAY EXAM CHEST 1 VIEW: CPT

## 2023-10-03 PROCEDURE — 83605 ASSAY OF LACTIC ACID: CPT

## 2023-10-03 PROCEDURE — 82553 CREATINE MB FRACTION: CPT

## 2023-10-03 PROCEDURE — 82962 GLUCOSE BLOOD TEST: CPT

## 2023-10-03 PROCEDURE — 85027 COMPLETE CBC AUTOMATED: CPT

## 2023-10-03 PROCEDURE — 80053 COMPREHEN METABOLIC PANEL: CPT

## 2023-10-03 PROCEDURE — 86706 HEP B SURFACE ANTIBODY: CPT

## 2023-10-03 PROCEDURE — 36415 COLL VENOUS BLD VENIPUNCTURE: CPT

## 2023-10-03 PROCEDURE — 87070 CULTURE OTHR SPECIMN AEROBIC: CPT

## 2023-10-03 PROCEDURE — 93306 TTE W/DOPPLER COMPLETE: CPT

## 2023-10-03 PROCEDURE — 83880 ASSAY OF NATRIURETIC PEPTIDE: CPT

## 2023-10-03 PROCEDURE — 83735 ASSAY OF MAGNESIUM: CPT

## 2023-10-03 PROCEDURE — 99285 EMERGENCY DEPT VISIT HI MDM: CPT

## 2023-10-03 PROCEDURE — 84100 ASSAY OF PHOSPHORUS: CPT

## 2023-10-03 PROCEDURE — 96374 THER/PROPH/DIAG INJ IV PUSH: CPT

## 2023-10-03 PROCEDURE — 84295 ASSAY OF SERUM SODIUM: CPT

## 2023-10-03 PROCEDURE — 99261: CPT

## 2023-10-03 PROCEDURE — 99238 HOSP IP/OBS DSCHRG MGMT 30/<: CPT

## 2023-10-03 PROCEDURE — 80048 BASIC METABOLIC PNL TOTAL CA: CPT

## 2023-10-03 PROCEDURE — 84484 ASSAY OF TROPONIN QUANT: CPT

## 2023-10-03 PROCEDURE — 84132 ASSAY OF SERUM POTASSIUM: CPT

## 2023-10-03 PROCEDURE — 87340 HEPATITIS B SURFACE AG IA: CPT

## 2023-10-03 PROCEDURE — 87637 SARSCOV2&INF A&B&RSV AMP PRB: CPT

## 2023-10-03 PROCEDURE — 86704 HEP B CORE ANTIBODY TOTAL: CPT

## 2023-10-03 PROCEDURE — 96375 TX/PRO/DX INJ NEW DRUG ADDON: CPT

## 2023-10-03 PROCEDURE — 85025 COMPLETE CBC W/AUTO DIFF WBC: CPT

## 2023-10-03 PROCEDURE — 86803 HEPATITIS C AB TEST: CPT

## 2023-10-03 PROCEDURE — 82550 ASSAY OF CK (CPK): CPT

## 2023-10-03 RX ORDER — NIFEDIPINE 30 MG
1 TABLET, EXTENDED RELEASE 24 HR ORAL
Qty: 30 | Refills: 0
Start: 2023-10-03 | End: 2023-11-01

## 2023-10-03 RX ORDER — CHLORHEXIDINE GLUCONATE 213 G/1000ML
1 SOLUTION TOPICAL
Refills: 0 | Status: DISCONTINUED | OUTPATIENT
Start: 2023-10-03 | End: 2023-10-03

## 2023-10-03 RX ORDER — LISINOPRIL 2.5 MG/1
1 TABLET ORAL
Qty: 0 | Refills: 0 | DISCHARGE

## 2023-10-03 RX ORDER — NICOTINE POLACRILEX 2 MG
4 GUM BUCCAL ONCE
Refills: 0 | Status: COMPLETED | OUTPATIENT
Start: 2023-10-03 | End: 2023-10-03

## 2023-10-03 RX ADMIN — CARVEDILOL PHOSPHATE 12.5 MILLIGRAM(S): 80 CAPSULE, EXTENDED RELEASE ORAL at 06:40

## 2023-10-03 RX ADMIN — LISINOPRIL 20 MILLIGRAM(S): 2.5 TABLET ORAL at 06:40

## 2023-10-03 RX ADMIN — Medication 4 MILLIGRAM(S): at 08:21

## 2023-10-03 RX ADMIN — HEPARIN SODIUM 5000 UNIT(S): 5000 INJECTION INTRAVENOUS; SUBCUTANEOUS at 06:39

## 2023-10-03 RX ADMIN — Medication 60 MILLIGRAM(S): at 06:40

## 2023-10-03 RX ADMIN — MUPIROCIN 1 APPLICATION(S): 20 OINTMENT TOPICAL at 06:39

## 2023-10-03 NOTE — CONSULT NOTE ADULT - SUBJECTIVE AND OBJECTIVE BOX
C A R D I O L O G Y  *********************    DATE OF SERVICE: 10-03-23    HISTORY OF PRESENT ILLNESS:  Patient is a 52 year old male from home, ambulates independently at baseline, A&OX3, with PMH of HTN, DM, anemia, ESRD on HD (M/W/F), CHF, known NICM s/p cath this year,  cirrhosis, and chronic recurrent ascites (goes to [facility] radiology once ever 3 weeks to get it drained) presented to ED with chest pressure  Patient states he was resting, when he had sudden onset of chest pressure, associated with lightheadedness and felt like he was going to pass out. Patient states the this lasted about an hour and has since resolved. Patient states he has been having generalized weakness for one day. Patient denies any fever, chills, nausea chest pain, SOB, abdominal pain, or change in bowel habits. Patient's last HD session was on Friday.  Patient last Ascites drained Thursday 9/22 when they removed 4.5L.   CArdiology is called for bradycardia ? heart block      PAST MEDICAL & SURGICAL HISTORY:  Type 2 diabetes mellitus  Hypertension  End stage renal disease  started HD 2/2019 T, Th, Sat via right chest permacath  Bone spur  right shoulder- hx of - sx done  Anemia  Injury of right wrist  hx of at age 14  History of hyperkalemia  before HD- K-6.2 - to repeat in am of sx, pt had HD today  S/P arthroscopy of right shoulder  2010  History of vascular access device  right chest permacath - 2/2019  H/O right wrist surgery  tendons repair - at age 14  AV fistula        MEDICATIONS:  MEDICATIONS  (STANDING):  carvedilol 12.5 milliGRAM(s) Oral every 12 hours  chlorhexidine 2% Cloths 1 Application(s) Topical <User Schedule>  heparin   Injectable 5000 Unit(s) SubCutaneous every 8 hours  influenza   Vaccine 0.5 milliLiter(s) IntraMuscular once  insulin lispro (ADMELOG) corrective regimen sliding scale   SubCutaneous at bedtime  insulin lispro (ADMELOG) corrective regimen sliding scale   SubCutaneous three times a day before meals  lisinopril 20 milliGRAM(s) Oral daily  mupirocin 2% Ointment 1 Application(s) Both Nostrils two times a day  NIFEdipine XL 60 milliGRAM(s) Oral daily  traZODone 50 milliGRAM(s) Oral at bedtime      Allergies    No Known Allergies    Intolerances        FAMILY HISTORY:  FH: hypertension  mother, father- alive    FH: type 2 diabetes  mother, father- alive      Non-contributary for premature coronary disease or sudden cardiac death    SOCIAL HISTORY:    [ ] Non-smoker  [ X] Smoker  [ ] Alcohol        REVIEW OF SYSTEMS:  [ ]chest pain  [  ]shortness of breath  [  ]palpitations  [  ]syncope  [ ]near syncope [ ]upper extremity weakness   [ ] lower extremity weakness  [  ]diplopia  [  ]altered mental status   [  ]fevers  [ ]chills [ ]nausea  [ ]vomitting  [  ]dysphagia    [ ]abdominal pain  [ ]melena  [ ]BRBPR    [  ]epistaxis  [  ]rash    [ ]lower extremity edema        [X] All others negative	  [ ] Unable to obtain      LABS:	 	    CARDIAC MARKERS:  CARDIAC MARKERS ( 02 Oct 2023 07:04 )  x     / x     / 112 U/L / x     / 5.0 ng/mL        Troponin I, High Sensitivity Result: 332.5 ng/L (10-02-23 @ 07:04)  Troponin I, High Sensitivity Result: 449.1 ng/L (10-01-23 @ 23:00)                            11.8   6.88  )-----------( 216      ( 03 Oct 2023 10:37 )             36.3     Hb Trend: 11.8<--    10-02    137  |  104  |  44<H>  ----------------------------<  85  4.1   |  30  |  5.00<H>    Ca    8.2<L>      02 Oct 2023 06:25  Phos  5.0     10-03  Mg     2.3     10-03    TPro  7.3  /  Alb  3.1<L>  /  TBili  0.5  /  DBili  x   /  AST  24  /  ALT  17  /  AlkPhos  93  10-01    Creatinine Trend: 5.00<--, 8.85<--, 8.66<--        PHYSICAL EXAM:  T(C): 36.4 (10-03-23 @ 11:11), Max: 37.4 (10-02-23 @ 22:17)  HR: 71 (10-03-23 @ 11:11) (62 - 71)  BP: 130/99 (10-03-23 @ 11:11) (130/99 - 200/89)  RR: 16 (10-03-23 @ 11:11) (16 - 18)  SpO2: 98% (10-03-23 @ 11:11) (85% - 98%)  Wt(kg): --   BMI (kg/m2): 28.6 (10-01-23 @ 22:41)  I&O's Summary    03 Oct 2023 07:01  -  03 Oct 2023 12:01  --------------------------------------------------------  IN: 220 mL / OUT: 0 mL / NET: 220 mL    HEENT:  (-)icterus (-)pallor  CV: N S1 S2 1/6 AMANDA (+)2 Pulses B/l  Resp:  Clear to ausculatation B/L, normal effort  GI: (+) BS Soft, NT, ND  Lymph:  (-)Edema, (-)obvious lymphadenopathy  Skin: Warm to touch, Normal turgor  Psych: Appropriate mood and affect        TELEMETRY: 	  sinus     ECG:  	? CHB ventricualr rate 33 BPM    RADIOLOGY:         CXR:     < from: Xray Chest 1 View- PORTABLE-Urgent (Xray Chest 1 View- PORTABLE-Urgent .) (10.02.23 @ 00:08) >  No focal infiltrate or congestion. Heart is at the upper   limits of normal in its transthoracic diameter on this portable exam.   Regional osseous structures appropriate for age.    < end of copied text >    ASSESSMENT/PLAN: 	52 year old male from home, ambulates independently at baseline, A&OX3, with PMH of HTN, DM, anemia, ESRD on HD (M/W/F), CHF, known NICM s/p cath this year,  cirrhosis, and chronic recurrent ascites (goes to [facility] radiology once ever 3 weeks to get it drained) presented to ED with chest pressure near syncope found with bradycardia in the setting of hyperkalemic    # bradycardia   - Worrisome of CHB, EP kali Dominique called      # Cardiomyopathy  - Appears well compensated from a CHF prospectice  - Cont coreg and lisinopril for now  - con tele      # ESRD  - HD per renal  - monitor K+    I once again thank you for allowing me to participate in the care of your patient.  If you have any questions or concerns please do not hesitate to contact me.    Deep Benoit MD, [facility]  BEEPER [phone]

## 2023-10-03 NOTE — PROGRESS NOTE ADULT - ATTENDING COMMENTS
Patient was seen and examined, he feels well. No chest pain or shortness of breath. He was offered an extra HD session by nephrologist this am but he refused. D/w Dr. Griffiths, patient is not volume overloaded, he ambulated w/ no difficulty and O2 sats noted to be over 95%. Patient is for DC home and out-patient nephro f/u. Dietary compliance urged

## 2023-10-03 NOTE — CONSULT NOTE ADULT - SUBJECTIVE AND OBJECTIVE BOX
EP  HISTORY OF PRESENT ILLNESS: HPI:  Patient is a 52 year old male from home, ambulates independently at baseline, A&OX3, with PMH of HTN, DM, anemia, ESRD on HD (M/W/F), CHF, cirrhosis, and chronic recurrent ascites (goes to [facility] radiology once ever 3 weeks to get it drained) presented to ED with chest pressure starting today. Patient states he was resting, when he had sudden onset of chest pressure, associated with lightheadedness and felt like he was going to pass out. Patient states the this lasted about an hour and has since resolved. Patient states he has been having generalized weakness for one day. Patient denies any fever, chills, nausea chest pain, SOB, abdominal pain, or change in bowel habits. Patient's last HD session was on Friday.  Patient last Ascites drained Thursday 9/22 when they removed 4.5L.   (02 Oct 2023 02:23)    Mr Torres is a very pleasant 52yoM here for global weakness and collapse/fainting at home.  On arrival found to be bradycardic in the 30s, with a narrow complex rhythm on EKG.  Potassium levels were elevated, and he was taken for urgent hemodialysis.  He has recovered.  EP called re: determining why he was so bradycardic, and to determine if he requires permanent pacing.  Patient states his last dialysis session was 48hrs prior to arrival.  He has been drinking grapefruit juice and enjoying whole grapefruit starting just before the weekend (~3 days).  He takes carvedilol 12.5mg BID, and lisinopril for chronic systolic CHF.  He is aware of an LVEF% < 35%, and knows he should talk to his cardiology team at [facility] about an ICD.   He previously had a right sided permacath for dialysis.  Currently dialyzes via left upper arm AV fistula.  No history of palpitations or fainting prior to this hospitalization.    PAST MEDICAL & SURGICAL HISTORY:  Type 2 diabetes mellitus  Hypertension  End stage renal disease  started HD 2/2019 T, Th, Sat via right chest permacath  Bone spur  right shoulder- hx of - sx done  Anemia  Injury of right wrist  hx of at age 14  History of hyperkalemia  before HD- K-6.2 - to repeat in am of sx, pt had HD today  S/P arthroscopy of right shoulder  2010  History of vascular access device  right chest permacath - 2/2019  H/O right wrist surgery  tendons repair - at age 14  AV fistula    MEDICATIONS  (STANDING):  carvedilol 12.5 milliGRAM(s) Oral every 12 hours  chlorhexidine 2% Cloths 1 Application(s) Topical <User Schedule>  heparin   Injectable 5000 Unit(s) SubCutaneous every 8 hours  influenza   Vaccine 0.5 milliLiter(s) IntraMuscular once  insulin lispro (ADMELOG) corrective regimen sliding scale   SubCutaneous at bedtime  insulin lispro (ADMELOG) corrective regimen sliding scale   SubCutaneous three times a day before meals  lisinopril 20 milliGRAM(s) Oral daily  mupirocin 2% Ointment 1 Application(s) Both Nostrils two times a day  NIFEdipine XL 60 milliGRAM(s) Oral daily  traZODone 50 milliGRAM(s) Oral at bedtime    Allergies    No Known Allergies    Intolerances      FAMILY HISTORY:  FH: hypertension  mother, father- alive    FH: type 2 diabetes  mother, father- alive    Non-contributary for premature coronary disease or sudden cardiac death    SOCIAL HISTORY:    [ x] Non-smoker  [ ] Smoker  [ ] Alcohol      PHYSICAL EXAM:  T(C): 36.4 (10-03-23 @ 11:11), Max: 37.4 (10-02-23 @ 22:17)  HR: 71 (10-03-23 @ 11:11) (62 - 71)  BP: 130/99 (10-03-23 @ 11:11) (130/99 - 200/89)  RR: 16 (10-03-23 @ 11:11) (16 - 18)  SpO2: 98% (10-03-23 @ 11:11) (85% - 98%)  Wt(kg): --    Appearance: Normal appearing adult male in no acute distress	  HEENT:   Normal oral mucosa, PERRL, EOMI	  Lymphatic: No lymphadenopathy , no edema  Cardiovascular: Normal S1 S2, No JVD, No murmurs , Peripheral pulses palpable 2+ bilaterally, left arm AVF.  Respiratory: Lungs clear to auscultation, normal effort 	  Gastrointestinal:  Soft, Non-tender, + BS	  Skin: No rashes, No ecchymoses, No cyanosis, warm to touch  Musculoskeletal: Normal range of motion, normal strength  Psychiatry:  Mood & affect appropriate    TELEMETRY: NSR, narrow QRS	    ECG:  	  Sinus arrest, rare P waves.  Junctional escape rhythm (retrograde P-waves embedded in QRS), longer ST-segments, peaked T-waves.    Echo:  < from: Transthoracic Echocardiogram (10.02.23 @ 13:52) >  DIMENSIONS:  Dimensions:     Normal Values:  LA:     5.7 cm    2.0 - 4.0 cm  Ao:     3.5 cm    2.0 - 3.8 cm  SEPTUM: 1.2 cm    0.6 - 1.2 cm  PWT:    1.2 cm    0.6 - 1.1 cm  LVIDd:  6.1 cm    3.0 - 5.6 cm  LVIDs:  4.7 cm    1.8 - 4.0 cm      Derived Variables:  LVMI: 152 g/m2  RWT: 0.39  Ejection Fraction Michaels: 34 %    ------------------------------------------------------------------------  OBSERVATIONS:  Mitral Valve: Tethered mitral valve leaflets. Trace mitral  regurgitation.  Aortic Root: Aortic Root: 3.5 cm. Ascending aorta not well  seen.  Aortic Valve: Trileaflet aortic valve. No aortic stenosis.  Mild aortic regurgitation.  Left Atrium: Severely dilated left atrium.  LA volume index  = 49 cc/m2.  Left Ventricle: Moderate global left ventricular systolic  dysfunction (LVEF 34% by biplane) Dilated left ventricle  (LVIDd 6.1cm). Mildly increased left ventricular wall  thickness.  Grade III diastolic dysfunction (severe).  Right Heart: Mild right atrial enlargement. Right  ventricular enlargement with normal RV systolic function  (TAPSE 2.2cm). Normal tricuspid valve. At least moderate  tricuspid regurgitation,eccentric jet severity may be  underestimated. Normal pulmonic valve. Mild-moderate  pulmonic insufficiency is noted.  Pericardium/PleuraSmall pericardial effusion. No  echocardiographic evidence of tamponade physiology. Cardiac  tamponade is a clinical diagnosis.  Hemodynamic: RA Pressure is 8 mm Hg. RV systolic pressure  is severely increased at  60 mm Hg.    < end of copied text >    LABS:	 	                          11.8   6.88  )-----------( 216      ( 03 Oct 2023 10:37 )             36.3     10-03    139  |  102  |  57<H>  ----------------------------<  132<H>  5.1   |  28  |  7.89<H>    Ca    8.6      03 Oct 2023 10:37  Phos  5.0     10-03  Mg     2.3     10-03    TPro  7.3  /  Alb  3.1<L>  /  TBili  0.5  /  DBili  x   /  AST  24  /  ALT  17  /  AlkPhos  93  10-01    ASSESSMENT/PLAN: Mr Torres is a very pleasant 52y Male here w/ weakness and syncope at home.  His presenting rhythm was essentially a junctional escape rhythm with sinus arrest, and peaked T-waves worrisome for hyperkalemia.  The mechanism of bradycardia was multifactorial:    -increased dietary potassium from grapefruit intake.  -longer interdialytic period over the weekend.  -beta blocker toxicity from grapefruit juice interference in carvedilol metabolism.  Dialysis treated the hyperkalemia, but as carvedilol is lipid-soluble, it does not dialyze-off the beta blocker.  With potassium over 7, I would expect the QRS to start widening, but can certainly see loss of P-waves in certain individuals.  This is likely a multi-hit phenomenon as described above.    He should continue carvedilol and his ACE inhibitor for CHF management.  He should NOT drink grapefruit or orange juice and needs re-education on which high-potassium foods to avoid.  Recommend outpatient evaluation for a primary prevention ICD.  Being a younger patient on hemodialysis, an S-ICD system is a good option.  If he needs atrial pacing eventually, the EP community is working towards atrial lead-less pacing (the AVEIR platform from [employer]), but this is not available commercially yet.  At this time he does not require ventricular pacing or cardiac resynchronization therapy.  He can be discharged home with close followup w/ his [facility] cardiology team.      Elfego Dominique M.D.  Cardiac Electrophysiology    office [phone]  pager [phone]

## 2023-10-03 NOTE — PROGRESS NOTE ADULT - SUBJECTIVE AND OBJECTIVE BOX
NEPHROLOGY MEDICAL CARE, [facility] - Dr. Domenic Griffiths/ Dr. Bishnu Amador/ Dr. Fili Smiley/ Dr. Naomie Crowder    Date of Service: 10-03-23    Patient was seen and examined at bedside.    CC: patient is okay and no sob;  can't get sleep    Vital Signs Last 24 Hrs  T(C): 36.4 (03 Oct 2023 11:11), Max: 37.4 (02 Oct 2023 22:17)  T(F): 97.5 (03 Oct 2023 11:11), Max: 99.3 (02 Oct 2023 22:17)  HR: 71 (03 Oct 2023 11:11) (62 - 71)  BP: 130/99 (03 Oct 2023 11:11) (130/99 - 200/89)  BP(mean): --  RR: 16 (03 Oct 2023 11:11) (16 - 18)  SpO2: 98% (03 Oct 2023 11:11) (85% - 98%)    Parameters below as of 03 Oct 2023 11:11  Patient On (Oxygen Delivery Method): room air        10-03 @ 07:01  -  10-03 @ 12:05  --------------------------------------------------------  IN: 220 mL / OUT: 0 mL / NET: 220 mL        PHYSICAL EXAM:  GENERAL: No acute respiratory distress.  HEAD:  Atraumatic, Normocephalic  EYES: conjunctiva and sclera clear  ENMT: Moist mucous membranes  NECK: Supple, No JVD  NERVOUS SYSTEM:  Awake, Alert & Oriented X3, No focal deficits present.   CHEST/LUNG: b/l rhonchi; no rales or wheezing  HEART: Regular rate and rhythm; No murmurs, rubs, or gallops  ABDOMEN: Soft, Non-tender, Nondistended; Bowel sounds present  EXTREMITIES: pedal edema  SKIN: No rashes or lesions  DIALYSIS ACCESS:  AVF thrill/bruit+       MEDICATIONS:  MEDICATIONS  (STANDING):  carvedilol 12.5 milliGRAM(s) Oral every 12 hours  chlorhexidine 2% Cloths 1 Application(s) Topical <User Schedule>  heparin   Injectable 5000 Unit(s) SubCutaneous every 8 hours  influenza   Vaccine 0.5 milliLiter(s) IntraMuscular once  insulin lispro (ADMELOG) corrective regimen sliding scale   SubCutaneous at bedtime  insulin lispro (ADMELOG) corrective regimen sliding scale   SubCutaneous three times a day before meals  lisinopril 20 milliGRAM(s) Oral daily  mupirocin 2% Ointment 1 Application(s) Both Nostrils two times a day  NIFEdipine XL 60 milliGRAM(s) Oral daily  traZODone 50 milliGRAM(s) Oral at bedtime    MEDICATIONS  (PRN):  sodium chloride 0.9% Bolus. 100 milliLiter(s) IV Bolus every 5 minutes PRN SBP LESS THAN or EQUAL to 100 mmHg          LABS:                        11.8   6.88  )-----------( 216      ( 03 Oct 2023 10:37 )             36.3     10-02    137  |  104  |  44<H>  ----------------------------<  85  4.1   |  30  |  5.00<H>    Ca    8.2<L>      02 Oct 2023 06:25  Phos  5.0     10-03  Mg     2.3     10-03    TPro  7.3  /  Alb  3.1<L>  /  TBili  0.5  /  DBili  x   /  AST  24  /  ALT  17  /  AlkPhos  93  10-01      Urinalysis Basic - ( 02 Oct 2023 06:25 )    Color: x / Appearance: x / SG: x / pH: x  Gluc: 85 mg/dL / Ketone: x  / Bili: x / Urobili: x   Blood: x / Protein: x / Nitrite: x   Leuk Esterase: x / RBC: x / WBC x   Sq Epi: x / Non Sq Epi: x / Bacteria: x      Magnesium: 2.3 mg/dL (10-03 @ 10:37)  Phosphorus: 5.0 mg/dL (10-03 @ 10:37)    Urine studies    PTH and Vit D:        
PGY-1 Progress Note discussed with attending    PAGER #: [[phone]] TILL 5:00 PM  PLEASE CONTACT ON CALL TEAM:  - On Call Team (Please refer to Alva) FROM 5:00 PM - 8:30PM  - Nightfloat Team FROM 8:30 -7:30 AM    CHIEF COMPLAINT & BRIEF HOSPITAL COURSE:    INTERVAL HPI/OVERNIGHT EVENTS:   MEDICATIONS  (STANDING):  carvedilol 12.5 milliGRAM(s) Oral every 12 hours  chlorhexidine 2% Cloths 1 Application(s) Topical <User Schedule>  heparin   Injectable 5000 Unit(s) SubCutaneous every 8 hours  influenza   Vaccine 0.5 milliLiter(s) IntraMuscular once  insulin lispro (ADMELOG) corrective regimen sliding scale   SubCutaneous at bedtime  insulin lispro (ADMELOG) corrective regimen sliding scale   SubCutaneous three times a day before meals  lisinopril 20 milliGRAM(s) Oral daily  mupirocin 2% Ointment 1 Application(s) Both Nostrils two times a day  NIFEdipine XL 60 milliGRAM(s) Oral daily  traZODone 50 milliGRAM(s) Oral at bedtime    MEDICATIONS  (PRN):  sodium chloride 0.9% Bolus. 100 milliLiter(s) IV Bolus every 5 minutes PRN SBP LESS THAN or EQUAL to 100 mmHg      REVIEW OF SYSTEMS:  CONSTITUTIONAL: No fever, weight loss, or fatigue  RESPIRATORY: No shortness of breath  CARDIOVASCULAR: No chest pain  GASTROINTESTINAL: No abdominal pain.  GENITOURINARY: No dysuria  NEUROLOGICAL: No headaches  SKIN: No itching, burning, rashes    Vital Signs Last 24 Hrs  T(C): 36.4 (03 Oct 2023 11:11), Max: 37.4 (02 Oct 2023 22:17)  T(F): 97.5 (03 Oct 2023 11:11), Max: 99.3 (02 Oct 2023 22:17)  HR: 71 (03 Oct 2023 11:11) (62 - 71)  BP: 130/99 (03 Oct 2023 11:11) (130/99 - 200/89)  BP(mean): --  RR: 16 (03 Oct 2023 11:11) (16 - 18)  SpO2: 98% (03 Oct 2023 11:11) (85% - 98%)    Parameters below as of 03 Oct 2023 11:11  Patient On (Oxygen Delivery Method): room air        PHYSICAL EXAMINATION:  GENERAL: NAD, well built  HEAD:  Atraumatic, Normocephalic  EYES:  conjunctiva and sclera clear  CHEST/LUNG: Clear to auscultation. No rales, rhonchi, wheezing, or rubs  HEART: Regular rate and rhythm; No murmurs, rubs, or gallops  ABDOMEN: Soft, Nontender, Nondistended; Bowel sounds present  NERVOUS SYSTEM:  Alert & Oriented X3,    EXTREMITIES:  2+ Peripheral Pulses, No clubbing, cyanosis, or edema  SKIN: warm dry                          11.8   6.88  )-----------( 216      ( 03 Oct 2023 10:37 )             36.3     10-02    137  |  104  |  44<H>  ----------------------------<  85  4.1   |  30  |  5.00<H>    Ca    8.2<L>      02 Oct 2023 06:25  Phos  3.3     10-02  Mg     2.1     10-02    TPro  7.3  /  Alb  3.1<L>  /  TBili  0.5  /  DBili  x   /  AST  24  /  ALT  17  /  AlkPhos  93  10-01    LIVER FUNCTIONS - ( 01 Oct 2023 23:00 )  Alb: 3.1 g/dL / Pro: 7.3 g/dL / ALK PHOS: 93 U/L / ALT: 17 U/L DA / AST: 24 U/L / GGT: x           CARDIAC MARKERS ( 02 Oct 2023 07:04 )  x     / x     / 112 U/L / x     / 5.0 ng/mL          CAPILLARY BLOOD GLUCOSE      RADIOLOGY & ADDITIONAL TESTS:

## 2023-10-03 NOTE — DISCHARGE NOTE PROVIDER - HOSPITAL COURSE
Patient is a 52 year old male from home, ambulates independently at baseline, A&OX3, with PMH of HTN, DM, anemia, ESRD on HD (M/W/F), CHF, cirrhosis, and chronic recurrent ascites (goes to [facility] radiology once ever 3 weeks to get it drained) presented to ED with chest pressure starting and global weakness/collapse at home on 10/2. Patient was found to have elevated K to 7 and bradycardia down to 33 with junctional bradycardia on EKG. ICU was consulted for bradycardia and hyperkalemia however patient did not require ICU at this time.  Patient was treated with insulin and dextrose to lower K and his potassium was found to be 5.4 on repeat. Nephrology was consulted for urgent HD session, which was indicated. He underwent urgent HD session overnight without fluid removal as per Nephrologist recommendations.  Patient is being admitted to tele for hyperkalemia, bradycardia, and elevated trops to 449.1. Electrophysiology was consulted to help determine why he was so bradycardic and to determine if he requires permanent pacing. Found that pt had been drinking grapefruit juice and eating whole grapefruit 3 days prior, which led to beta blocker toxicity. Grapefruit also has a high potassium content, compounding the issue. Notably, because carvedilol is lipid-soluble, it doesn't dialyze.         .  He is aware of an LVEF% < 35%, and knows he should talk to his cardiology team at [facility] about an ICD.  He previously had a right sided permacath for dialysis.  Currently dialyzes via left upper arm AV fistula.  No history of palpitations or fainting prior to this hospitalization.        HISTORY OF PRESENT ILLNESS: HPI:  Patient is a 52 year old male from home, ambulates independently at baseline, A&OX3, with PMH of HTN, DM, anemia, ESRD on HD (M/W/F), CHF, cirrhosis, and chronic recurrent ascites (goes to [facility] radiology once ever 3 weeks to get it drained) presented to ED with chest pressure starting today. Patient states he was resting, when he had sudden onset of chest pressure, associated with lightheadedness and felt like he was going to pass out. Patient states the this lasted about an hour and has since resolved. Patient states he has been having generalized weakness for one day. Patient denies any fever, chills, nausea chest pain, SOB, abdominal pain, or change in bowel habits. Patient's last HD session was on Friday.  Patient last Ascites drained Thursday 9/22 when they removed 4.5L.   (02 Oct 2023 02:23)        ASSESSMENT/PLAN: Mr Torres is a very pleasant 52y Male here w/ weakness and syncope at home.  His presenting rhythm was essentially a junctional escape rhythm with sinus arrest, and peaked T-waves worrisome for hyperkalemia.  The mechanism of bradycardia was multifactorial:    -increased dietary potassium from grapefruit intake.  -longer interdialytic period over the weekend.  -beta blocker toxicity from grapefruit juice interference in carvedilol metabolism.  Dialysis treated the hyperkalemia, but as carvedilol is lipid-soluble, it does not dialyze-off the beta blocker.  With potassium over 7, I would expect the QRS to start widening, but can certainly see loss of P-waves in certain individuals.  This is likely a multi-hit phenomenon as described above.    He should continue carvedilol and his ACE inhibitor for CHF management.  He should NOT drink grapefruit or orange juice and needs re-education on which high-potassium foods to avoid.  Recommend outpatient evaluation for a primary prevention ICD.  Being a younger patient on hemodialysis, an S-ICD system is a good option.  If he needs atrial pacing eventually, the EP community is working towards atrial lead-less pacing (the AVEIR platform from [employer]), but this is not available commercially yet.  At this time he does not require ventricular pacing or cardiac resynchronization therapy.  He can be discharged home with close followup w/ his [facility] cardiology team.   Patient is a 52 year old male from home, ambulates independently at baseline, A&OX3, with PMH of HTN, DM, anemia, ESRD on HD (M/W/F), CHF, cirrhosis, and chronic recurrent ascites (goes to [facility] radiology once ever 3 weeks to get it drained) presented to ED with chest pressure starting and global weakness/collapse at home on 10/2. Patient was found to have elevated K to 7 and bradycardia down to 33 with junctional bradycardia on EKG. ICU was consulted for bradycardia and hyperkalemia however patient did not require ICU at this time.  Patient was treated with insulin and dextrose to lower K and his potassium was found to be 5.4 on repeat. Nephrology was consulted for urgent HD session, which was indicated. He underwent urgent HD session overnight without fluid removal as per Nephrologist recommendations.  He later underwent another round of HD, and .5L was removed. Patient is being admitted to tele for hyperkalemia, bradycardia, and elevated trops to 449.1. Electrophysiology was consulted to help determine why he was so bradycardic and to determine if he requires permanent pacing. Found that pt had been drinking grapefruit juice and eating whole grapefruit 3 days prior, which led to beta blocker toxicity. Grapefruit also has a high potassium content, compounding the issue. Notably, because carvedilol is lipid-soluble, it doesn't dialyze.         .  He is aware of an LVEF% < 35%, and knows he should talk to his cardiology team at [facility] about an ICD.  He previously had a right sided permacath for dialysis.  Currently dialyzes via left upper arm AV fistula.  No history of palpitations or fainting prior to this hospitalization.        HISTORY OF PRESENT ILLNESS: HPI:  Patient is a 52 year old male from home, ambulates independently at baseline, A&OX3, with PMH of HTN, DM, anemia, ESRD on HD (M/W/F), CHF, cirrhosis, and chronic recurrent ascites (goes to [facility] radiology once ever 3 weeks to get it drained) presented to ED with chest pressure starting today. Patient states he was resting, when he had sudden onset of chest pressure, associated with lightheadedness and felt like he was going to pass out. Patient states the this lasted about an hour and has since resolved. Patient states he has been having generalized weakness for one day. Patient denies any fever, chills, nausea chest pain, SOB, abdominal pain, or change in bowel habits. Patient's last HD session was on Friday.  Patient last Ascites drained Thursday 9/22 when they removed 4.5L.   (02 Oct 2023 02:23)        ASSESSMENT/PLAN: Mr Torres is a very pleasant 52y Male here w/ weakness and syncope at home.  His presenting rhythm was essentially a junctional escape rhythm with sinus arrest, and peaked T-waves worrisome for hyperkalemia.  The mechanism of bradycardia was multifactorial:    -increased dietary potassium from grapefruit intake.  -longer interdialytic period over the weekend.  -beta blocker toxicity from grapefruit juice interference in carvedilol metabolism.  Dialysis treated the hyperkalemia, but as carvedilol is lipid-soluble, it does not dialyze-off the beta blocker.  With potassium over 7, I would expect the QRS to start widening, but can certainly see loss of P-waves in certain individuals.  This is likely a multi-hit phenomenon as described above.    He should continue carvedilol and his ACE inhibitor for CHF management.  He should NOT drink grapefruit or orange juice and needs re-education on which high-potassium foods to avoid.  Recommend outpatient evaluation for a primary prevention ICD.  Being a younger patient on hemodialysis, an S-ICD system is a good option.  If he needs atrial pacing eventually, the EP community is working towards atrial lead-less pacing (the AVEIR platform from [employer]), but this is not available commercially yet.  At this time he does not require ventricular pacing or cardiac resynchronization therapy.  He can be discharged home with close followup w/ his [facility] cardiology team.   Patient is a 52 year old male from home, ambulates independently at baseline, A&OX3, with PMH of HTN, DM, anemia, ESRD on HD (M/W/F), CHF, cirrhosis, and chronic recurrent ascites (goes to [facility] radiology once ever 3 weeks to get it drained) presented to ED with chest pressure starting and global weakness/collapse at home on 10/2. Patient was found to have elevated K to 7 and bradycardia down to 33 with junctional bradycardia on EKG. He was admitted to tele for hyperkalemia, bradycardia, and elevated trops to 449.1. ICU was consulted for bradycardia and hyperkalemia however patient did not require ICU at this time. Patient was treated with insulin and dextrose to lower K and his potassium was found to be 5.4 on repeat. Nephrology was consulted for urgent HD session, which was indicated. He underwent urgent HD session overnight without fluid removal as per Nephrologist recommendations. He later underwent another round of HD, and .5L was removed.     Electrophysiology was consulted to help determine why he was so bradycardic and to determine if he requires permanent pacing. Found that pt had been drinking grapefruit juice and eating whole grapefruit 3 days prior, which led to beta blocker toxicity. Grapefruit also has a high potassium content, compounding the issue. Notably, because carvedilol is lipid-soluble, it doesn't dialyze. He was educated about appropriate nutrition going forward.     Pt also has LVEF <35% and is aware he should talk to his cardiology team at [facility] about an S-ICD, which is appropriate given his young age and hemodialysis. Per EP, at this time he does not require ventricular pacing or cardiac resynchronization therapy, and he can be discharged home with close followup w/ his [facility] cardiology team.     Patient is a 52 year old male from home, ambulates independently at baseline, A&OX3, with PMH of HTN, DM, anemia, ESRD on HD (M/W/F), CHF, cirrhosis, and chronic recurrent ascites (goes to [facility] radiology once ever 3 weeks to get it drained) presented to ED with chest pressure starting and global weakness/collapse at home on 10/2. Patient was found to have elevated K to 7 and bradycardia down to 33 with junctional bradycardia on EKG. He was admitted to tele for hyperkalemia, bradycardia, and elevated trops to 449.1. ICU was consulted for bradycardia and hyperkalemia however patient did not require ICU at this time. Patient was treated with insulin and dextrose to lower K and his potassium was found to be 5.4 on repeat. Nephrology was consulted for urgent HD session, which was indicated. He underwent urgent HD session overnight without fluid removal as per Nephrologist recommendations. He later underwent another round of HD, and .5L was removed.     Electrophysiology was consulted to help determine why he was so bradycardic and to determine if he requires permanent pacing. Found that pt had been drinking grapefruit juice and eating whole grapefruit 3 days prior, which led to beta blocker toxicity. Grapefruit also has a high potassium content, compounding the issue. Notably, because carvedilol is lipid-soluble, it doesn't dialyze. He was educated about appropriate nutrition going forward.     Pt also has LVEF <35% and is aware he should talk to his cardiology team at [facility] about an S-ICD, which is appropriate given his young age and hemodialysis. Per EP, at this time he does not require ventricular pacing or cardiac resynchronization therapy, and he can be discharged home with close followup w/ his [facility] cardiology team.    Given patient's improved clinical status and current hemodynamic stability, decision was made to discharge. Discussed with attending. Please refer to patient's complete medical chart with documents for a full hospital course, for this is only a brief summary.       Patient is a 52 year old male from home, ambulates independently at baseline, A&OX3, with PMH of HTN, DM, anemia, ESRD on HD (M/W/F), CHF, cirrhosis, and chronic recurrent ascites (goes to [facility] radiology once ever 3 weeks to get it drained) presented to ED with chest pressure starting and global weakness/collapse at home on 10/2. Patient was found to have elevated K to 7 and bradycardia down to 33 with junctional bradycardia on EKG. He was admitted to tele for hyperkalemia, bradycardia, and elevated trops to 449.1. ICU was consulted for bradycardia and hyperkalemia however patient did not require ICU at this time. Patient was treated with insulin and dextrose to lower K and his potassium was found to be 5.4 on repeat. Nephrology was consulted for urgent HD session, which was indicated. He underwent urgent HD session overnight without fluid removal as per Nephrologist recommendations.     Electrophysiology was consulted to help determine why he was so bradycardic and to determine if he requires permanent pacing. Found that pt had been drinking grapefruit juice and eating whole grapefruit 3 days prior, which led to beta blocker toxicity. Grapefruit also has a high potassium content, compounding the issue. Notably, because carvedilol is lipid-soluble, it doesn't dialyze. He was educated about appropriate nutrition going forward.     Pt also has LVEF <35% and is aware he should talk to his cardiology team at [facility] about an S-ICD, which is appropriate given his young age and hemodialysis. Per EP, at this time he does not require ventricular pacing or cardiac resynchronization therapy, and he can be discharged home with close followup w/ his [facility] cardiology team.    Given patient's improved clinical status and current hemodynamic stability, decision was made to discharge. Discussed with attending. Please refer to patient's complete medical chart with documents for a full hospital course, for this is only a brief summary.

## 2023-10-03 NOTE — PROGRESS NOTE ADULT - PROBLEM SELECTOR PLAN 3
- Patient with elevated trops to 449  - Patient with chest pressure and lightheadedness at home  - symptoms resolved by time of exam in ED  - Trend trops till downtrend  - EKG without acute signs of ischemia  - Likely 2/2 to ESRD requiring urgent HD vs Cardiac (less likely)  - Consider Echo if chest pain returns or trops continue to rise  - Admit to tele

## 2023-10-03 NOTE — DISCHARGE NOTE PROVIDER - NSDCCPCAREPLAN_GEN_ALL_CORE_FT
PRINCIPAL DISCHARGE DIAGNOSIS  Diagnosis: Hyperkalemia  Assessment and Plan of Treatment: You were admitted with very slow heart rate and very high potassium, which caused weakness and an irregular heart rhythm. It is likely you were affected by BETA BLOCKER TOXICITY, which was induced when you drank grapefruit juice. Grapefruit and grapefruit juice is full of an enzyme that interacts with certain medications, including the coreg you take. You were admitted to telemetry to monitor your heart. You were treated with insulin and dextrose, which helped bring down your high potassium level, and your potassium normalized. You also had an emergent session of hemodialysis, which helped your symptoms. You will be discharged on your regular home medications. Please make sure to avoid grapefruit and grapefruit juice in the future while you are taking a beta blocker (a medication that ends in -lol). Please follow with your primary care doctor regularly.      SECONDARY DISCHARGE DIAGNOSES  Diagnosis: Elevated troponin level  Assessment and Plan of Treatment: You came with elevated troponin levels, which can indicate heart injury. You had an EKG, which found an unusual heart rhythm. There were no signs of ischemia. Likely, the elevated troponin was due to your kidney disease. Over the course of your hospital stay, the troponin levels normalized. If you have chest pain that may radiate into the left arm, please seek medical care urgently.       Diagnosis: Ascites  Assessment and Plan of Treatment: You have recurrent ascites due to cirrhosis, or fluid buildup in the abdomen because of liver dysfunction. Your last drainage was on 9/22 when 4.5L were removed. Please continue your regular sessions as usual and follow with your primary doctor frequently.    Diagnosis: Chronic CHF  Assessment and Plan of Treatment: You have chronic congestive heart failure, which means your heart doesn't pump blood appropriately. You take LISINOPRIL and CARVEDILOL at home. These were continued while you were at the hospital. Please continue at home and follow with your primary care doctor regularly.       Diagnosis: Diabetes  Assessment and Plan of Treatment: You have diabetes, or high blood sugar. While you were in the hospital, your oral diabetes medications (glipizide 10) was held, and you were put on an insulin sliding scale to ensure tight glucose control. When you go home, please continue with GLIPIZIDE 10 as prescribed and follow with your primary care doctor.    Diagnosis: Hyperkalemia  Assessment and Plan of Treatment: You were admitted with very slow heart rate and very high potassium, which caused weakness and an irregular heart rhythm. It is likely you were affected by BETA BLOCKER TOXICITY, which was induced when you drank grapefruit juice. Grapefruit and grapefruit juice is full of an enzyme that interacts with certain medications, including the coreg you take. You were admitted to telemetry to monitor your heart. You were treated with insulin and dextrose, which helped bring down your high potassium level, and your potassium normalized. You also had an emergent session of hemodialysis, which helped your symptoms. You will be discharged on your regular home medications. Please make sure to avoid grapefruit and grapefruit juice in the future while you are taking a beta blocker (a medication that ends in -lol). Please follow with your primary care doctor regularly.    Diagnosis: Hypertension  Assessment and Plan of Treatment: You have high blood pressure, for which you take LISINOPRIL 20, NIFEDIPINE 60, and COREG 12.5 daily. While you were here, we continued your lisinopril and nifedipine and initially held your coreg. On discharge, you will continue your regular medications as usual. Your nifedipine dose will be increased from 60 to 90. Please follow with your primary care doctor regularly.     PRINCIPAL DISCHARGE DIAGNOSIS  Diagnosis: Hyperkalemia  Assessment and Plan of Treatment: You were admitted with very slow heart rate and very high potassium, which caused weakness and an irregular heart rhythm. It is likely you were affected by BETA BLOCKER TOXICITY, which was induced when you drank grapefruit juice. Grapefruit and grapefruit juice is full of an enzyme that interacts with certain medications, including the coreg you take. You were admitted to telemetry to monitor your heart. You were treated with insulin and dextrose, which helped bring down your high potassium level, and your potassium normalized. You also had an emergent session of hemodialysis, which helped your symptoms. You will be discharged on your regular home medications. Please make sure to avoid grapefruit and grapefruit juice in the future while you are taking a beta blocker (a medication that ends in -lol). Please follow with your primary care doctor regularly.      SECONDARY DISCHARGE DIAGNOSES  Diagnosis: Elevated troponin level  Assessment and Plan of Treatment: You came with elevated troponin levels, which can indicate heart injury. You had an EKG, which found an unusual heart rhythm. There were no signs of ischemia. Likely, the elevated troponin was due to your kidney disease. Over the course of your hospital stay, the troponin levels normalized. If you have chest pain that may radiate into the left arm, please seek medical care urgently.       Diagnosis: Ascites  Assessment and Plan of Treatment: You have recurrent ascites due to cirrhosis, or fluid buildup in the abdomen because of liver dysfunction. Your last drainage was on 9/22 when 4.5L were removed. Please continue your regular sessions as usual and follow with your primary doctor frequently.    Diagnosis: Chronic CHF  Assessment and Plan of Treatment: You have chronic congestive heart failure, which means your heart doesn't pump blood appropriately. You take LISINOPRIL and CARVEDILOL at home. These were continued while you were at the hospital. Please continue at home and follow with your primary care doctor regularly.       Diagnosis: Diabetes  Assessment and Plan of Treatment: You have diabetes, or high blood sugar. While you were in the hospital, your oral diabetes medications (glipizide 10) was held, and you were put on an insulin sliding scale to ensure tight glucose control. When you go home, please continue with GLIPIZIDE 10 as prescribed and follow with your primary care doctor.    Diagnosis: Hypertension  Assessment and Plan of Treatment: You have high blood pressure, for which you take LISINOPRIL 20, NIFEDIPINE 60, and COREG 12.5 daily. While you were here, we continued your lisinopril and nifedipine and initially held your coreg. On discharge, we will increase your nifedipine from 60 to 90 daily. PLease continue with your primary care doctor regularly.     PRINCIPAL DISCHARGE DIAGNOSIS  Diagnosis: Hyperkalemia  Assessment and Plan of Treatment: You were admitted with very slow heart rate and very high potassium, which caused weakness and an irregular heart rhythm. It is likely you were affected by BETA BLOCKER TOXICITY, which was induced when you drank grapefruit juice. Grapefruit and grapefruit juice is full of an enzyme that interacts with certain medications, including the coreg you take. You were admitted to telemetry to monitor your heart. You were treated with insulin and dextrose, which helped bring down your high potassium level, and your potassium normalized. You also had an emergent session of hemodialysis, which helped your symptoms. You will be discharged on your regular home medications. Please make sure to avoid grapefruit and grapefruit juice in the future while you are taking a beta blocker (a medication that ends in -lol). Please follow with your primary care doctor regularly.  Hold lisinopril, follow with your nephrologist.      SECONDARY DISCHARGE DIAGNOSES  Diagnosis: Acute respiratory failure with hypoxia  Assessment and Plan of Treatment: You were noted to have low oxygen levels on admisson, you underwent urgent hemodialysis after which symptoms improved.    Diagnosis: Bradycardia  Assessment and Plan of Treatment: You were found to have low HR on admission, you were evaluated by cardiologist. Recommend outpatient evaluation for a primary prevention ICD.  Being a younger patient on hemodialysis, an S-ICD system is a good option per Electrophysilogist and advised to follow up with your primary cardiologist.    Diagnosis: Diabetes  Assessment and Plan of Treatment: You have diabetes, or high blood sugar. While you were in the hospital, your oral diabetes medications (glipizide 10) was held, and you were put on an insulin sliding scale to ensure tight glucose control. When you go home, please continue with GLIPIZIDE 10 as prescribed and follow with your primary care doctor.    Diagnosis: Ascites  Assessment and Plan of Treatment: You have recurrent ascites due to cirrhosis, or fluid buildup in the abdomen because of liver dysfunction. Your last drainage was on 9/22 when 4.5L were removed. Please continue your regular sessions as usual and follow with your primary doctor frequently.    Diagnosis: Chronic CHF  Assessment and Plan of Treatment: You have chronic congestive heart failure, which means your heart doesn't pump blood appropriately. You take LISINOPRIL and CARVEDILOL at home. These were continued while you were at the hospital. Please continue at home and follow with your primary care doctor regularly.       Diagnosis: Hypertension  Assessment and Plan of Treatment: You have high blood pressure, for which you take NIFEDIPINE 60, and COREG 12.5 daily. While you were here, we continued your lisinopril and nifedipine and initially held your coreg. On discharge, we will increase your nifedipine from 60 to 90 daily. HOLD lisinopril as it causes potassium retention as well.  PLease continue with your primary care doctor regularly.

## 2023-10-03 NOTE — PROGRESS NOTE ADULT - ASSESSMENT
1. ESRD on HD (M/W/F or T/TH/SAT)  -HD today was planning for UF but pt refused. Patient's volume status is clinically stable. HD orders were written and discussed with dialysis nurse.   -Plan for HD tomorrow at outpatient dialysis center if discharged.   -Hemodialysis Renal Diet and Fluid restriction to 1L/day  -Adjust meds to eGFR and avoid IV Gadolinium contrast,NSAIDs, and phosphate enema.  -Monitor Electrolytes daily.  2. Anemia of ESRD:  -order Epogen if hb <10.0g/dL.   -F/u CBC daily  -transfuse if HB < 7.0.  3. HTN:   -bp is acceptable.   -continue coreg 12.5mg bid if hr >60; increase nifedipine 90mg daily.  -titrate bp meds to keep sbp >110 and < 130  4. Mineral Bone Disease:  -continue phos binders  5. Hyperkalemia:  -k is stable post HD yesterday; awaiting labs.  -avoid lisinopril for now.     d/w primary team.     
Patient is a 52 year old male from home, ambulates independently at baseline, A&OX3, with PMH of HTN, DM, anemia, ESRD on HD (M/W/F), CHF, cirrhosis, and chronic recurrent ascites (goes to [facility] radiology once ever 3 weeks to get it drained) presented to ED with chest pressure starting today.  Patient was found to have elevated K to 7 and bradycardia down to 33 with junctional bradycardia on EKG.  ICU was consulted for bradycardia and hyperkalemia however patient did not require ICU at this time.  patient was treated with Insulin and dextrose to lower K and his potassium was found to be 5.4 on repeat.  Nephrology was consulted for urgent HD session which was indicated and patient had Urgent HD session overnight without fluid removal as per Nephrologist recommendations.  Patient will likely require repeat HD session for fluid removal.  Patient is being admitted to tele for hyperkalemia, bradycardia, and elevated trops to 449.1.

## 2023-10-03 NOTE — DISCHARGE NOTE NURSING/CASE MANAGEMENT/SOCIAL WORK - NSDCVIVACCINE_GEN_ALL_CORE_FT
Tdap; 14-Dec-2021 19:18; Roxi Lewis (MARIA ELENA); [employer]; [id number] (Exp. Date: 09-Sep-2023); IntraMuscular; Deltoid Right.; 0.5 milliLiter(s); VIS (VIS Published: 09-May-2013, VIS Presented: 14-Dec-2021);

## 2023-10-03 NOTE — PROGRESS NOTE ADULT - PROBLEM SELECTOR PLAN 5
- Patient with recurrent episodes of ascites requiring drainage every 3 weeks  - Patients last drainage on 9/22 with 4.5L removed  - Patient with mild ascites on exam  - Patient with history of Cirrhosis

## 2023-10-03 NOTE — DISCHARGE NOTE PROVIDER - CARE PROVIDER_API CALL
Domenic Griffiths  Internal Medicine  [address], [place] [address]  Phone: [phone]  Fax: [phone]  Follow Up Time: 1-3 days    FATOUMATA GAGNON  [address] [place] [address]  Phone: [phone]  Fax: ()-  Follow Up Time: 1 week

## 2023-10-03 NOTE — PROGRESS NOTE ADULT - PROBLEM SELECTOR PLAN 7
- Patient with recurrent episodes of ascites requiring drainage every 3 weeks  - Patients last drainage on 9/22 with 4.5L removed  - Patient with mild ascites on exam  - Patient with history of Cirrhosis  - Patient LFTs elevated

## 2023-10-03 NOTE — DISCHARGE NOTE PROVIDER - NSDCMRMEDTOKEN_GEN_ALL_CORE_FT
carvedilol 12.5 mg oral tablet: 1 tab(s) orally 2 times a day  glipiZIDE 10 mg oral tablet: 1 tab(s) orally once a day  lisinopril 20 mg oral tablet: 1 tab(s) orally once a day  traZODone 50 mg oral tablet: 1 tab(s) orally once a day (at bedtime)   carvedilol 12.5 mg oral tablet: 1 tab(s) orally 2 times a day  glipiZIDE 10 mg oral tablet: 1 tab(s) orally once a day  lisinopril 20 mg oral tablet: 1 tab(s) orally once a day  NIFEdipine 90 mg oral tablet, extended release: 1 tab(s) orally once a day  traZODone 50 mg oral tablet: 1 tab(s) orally once a day (at bedtime)   carvedilol 12.5 mg oral tablet: 1 tab(s) orally 2 times a day  glipiZIDE 10 mg oral tablet: 1 tab(s) orally once a day  NIFEdipine 90 mg oral tablet, extended release: 1 tab(s) orally once a day  traZODone 50 mg oral tablet: 1 tab(s) orally once a day (at bedtime)

## 2023-10-03 NOTE — DISCHARGE NOTE PROVIDER - ATTENDING DISCHARGE PHYSICAL EXAMINATION:
Vital Signs Last 24 Hrs  T(C): 36.8 (03 Oct 2023 15:58), Max: 37.4 (02 Oct 2023 22:17)  T(F): 98.2 (03 Oct 2023 15:58), Max: 99.3 (02 Oct 2023 22:17)  HR: 70 (03 Oct 2023 15:58) (62 - 71)  BP: 103/61 (03 Oct 2023 15:58) (103/61 - 180/87)  RR: 17 (03 Oct 2023 15:58) (16 - 18)  SpO2: 98% (03 Oct 2023 15:58) (85% - 98%)    Parameters below as of 03 Oct 2023 15:58  Patient On (Oxygen Delivery Method): room air    PHYSICAL EXAMINATION:  GENERAL: NAD, well built  HEAD:  Atraumatic, Normocephalic  EYES:  conjunctiva and sclera clear  CHEST/LUNG: Clear to auscultation. No rales, rhonchi, wheezing, or rubs  HEART: Regular rate and rhythm; No murmurs, rubs, or gallops  ABDOMEN: Soft, Nontender, Nondistended; Bowel sounds present  NERVOUS SYSTEM:  Alert & Oriented X3,    EXTREMITIES:  2+ Peripheral Pulses, No clubbing, cyanosis, or edema  SKIN: warm dry

## 2023-10-03 NOTE — DISCHARGE NOTE NURSING/CASE MANAGEMENT/SOCIAL WORK - NSDCPEFALRISK_GEN_ALL_CORE
For information on Fall & Injury Prevention, visit: [email or web address] OR  [email or web address] OR  https://www.cdc.gov/steadi/patient.html

## 2023-10-03 NOTE — DISCHARGE NOTE NURSING/CASE MANAGEMENT/SOCIAL WORK - PATIENT PORTAL LINK FT
You can access the FollowMyHealth Patient Portal offered by [facility] by registering at the following website: [email or web address]. By joining [employer]’s FollowMyHealth portal, you will also be able to view your health information using other applications (apps) compatible with our system.

## 2023-10-03 NOTE — DISCHARGE NOTE PROVIDER - PROVIDER TOKENS
PROVIDER:[TOKEN:[[id number]],FOLLOWUP:[1-3 days]],PROVIDER:[TOKEN:[[id number]],FOLLOWUP:[1 week]]

## 2023-11-02 ENCOUNTER — INPATIENT (INPATIENT)
Facility: HOSPITAL | Age: 53
LOS: 3 days | Discharge: HOME | DRG: 291 | End: 2023-11-06
Attending: INTERNAL MEDICINE | Admitting: INTERNAL MEDICINE
Payer: MEDICARE

## 2023-11-02 VITALS
HEART RATE: 97 BPM | WEIGHT: 199.96 LBS | DIASTOLIC BLOOD PRESSURE: 91 MMHG | OXYGEN SATURATION: 96 % | TEMPERATURE: 99 F | HEIGHT: 71 IN | SYSTOLIC BLOOD PRESSURE: 190 MMHG | RESPIRATION RATE: 23 BRPM

## 2023-11-02 DIAGNOSIS — Z98.890 OTHER SPECIFIED POSTPROCEDURAL STATES: Chronic | ICD-10-CM

## 2023-11-02 DIAGNOSIS — J81.0 ACUTE PULMONARY EDEMA: ICD-10-CM

## 2023-11-02 DIAGNOSIS — N18.6 END STAGE RENAL DISEASE: ICD-10-CM

## 2023-11-02 DIAGNOSIS — J96.01 ACUTE RESPIRATORY FAILURE WITH HYPOXIA: ICD-10-CM

## 2023-11-02 DIAGNOSIS — A41.9 SEPSIS, UNSPECIFIED ORGANISM: ICD-10-CM

## 2023-11-02 DIAGNOSIS — I50.9 HEART FAILURE, UNSPECIFIED: ICD-10-CM

## 2023-11-02 DIAGNOSIS — I10 ESSENTIAL (PRIMARY) HYPERTENSION: ICD-10-CM

## 2023-11-02 DIAGNOSIS — K74.60 UNSPECIFIED CIRRHOSIS OF LIVER: ICD-10-CM

## 2023-11-02 DIAGNOSIS — I77.0 ARTERIOVENOUS FISTULA, ACQUIRED: Chronic | ICD-10-CM

## 2023-11-02 DIAGNOSIS — Z29.9 ENCOUNTER FOR PROPHYLACTIC MEASURES, UNSPECIFIED: ICD-10-CM

## 2023-11-02 DIAGNOSIS — E11.9 TYPE 2 DIABETES MELLITUS WITHOUT COMPLICATIONS: ICD-10-CM

## 2023-11-02 LAB
ALBUMIN SERPL ELPH-MCNC: 3.3 G/DL — LOW (ref 3.5–5)
ALBUMIN SERPL ELPH-MCNC: 3.3 G/DL — LOW (ref 3.5–5)
ALP SERPL-CCNC: 99 U/L — SIGNIFICANT CHANGE UP (ref 40–120)
ALP SERPL-CCNC: 99 U/L — SIGNIFICANT CHANGE UP (ref 40–120)
ALT FLD-CCNC: 18 U/L DA — SIGNIFICANT CHANGE UP (ref 10–60)
ALT FLD-CCNC: 18 U/L DA — SIGNIFICANT CHANGE UP (ref 10–60)
ANION GAP SERPL CALC-SCNC: 7 MMOL/L — SIGNIFICANT CHANGE UP (ref 5–17)
ANION GAP SERPL CALC-SCNC: 7 MMOL/L — SIGNIFICANT CHANGE UP (ref 5–17)
APTT BLD: 34.7 SEC — SIGNIFICANT CHANGE UP (ref 24.5–35.6)
APTT BLD: 34.7 SEC — SIGNIFICANT CHANGE UP (ref 24.5–35.6)
AST SERPL-CCNC: 21 U/L — SIGNIFICANT CHANGE UP (ref 10–40)
AST SERPL-CCNC: 21 U/L — SIGNIFICANT CHANGE UP (ref 10–40)
BASE EXCESS BLDV CALC-SCNC: 11.2 MMOL/L — SIGNIFICANT CHANGE UP
BASE EXCESS BLDV CALC-SCNC: 11.2 MMOL/L — SIGNIFICANT CHANGE UP
BASOPHILS # BLD AUTO: 0.06 K/UL — SIGNIFICANT CHANGE UP (ref 0–0.2)
BASOPHILS # BLD AUTO: 0.06 K/UL — SIGNIFICANT CHANGE UP (ref 0–0.2)
BASOPHILS NFR BLD AUTO: 0.4 % — SIGNIFICANT CHANGE UP (ref 0–2)
BASOPHILS NFR BLD AUTO: 0.4 % — SIGNIFICANT CHANGE UP (ref 0–2)
BILIRUB SERPL-MCNC: 0.5 MG/DL — SIGNIFICANT CHANGE UP (ref 0.2–1.2)
BILIRUB SERPL-MCNC: 0.5 MG/DL — SIGNIFICANT CHANGE UP (ref 0.2–1.2)
BUN SERPL-MCNC: 45 MG/DL — HIGH (ref 7–18)
BUN SERPL-MCNC: 45 MG/DL — HIGH (ref 7–18)
CALCIUM SERPL-MCNC: 8.6 MG/DL — SIGNIFICANT CHANGE UP (ref 8.4–10.5)
CALCIUM SERPL-MCNC: 8.6 MG/DL — SIGNIFICANT CHANGE UP (ref 8.4–10.5)
CHLORIDE SERPL-SCNC: 103 MMOL/L — SIGNIFICANT CHANGE UP (ref 96–108)
CHLORIDE SERPL-SCNC: 103 MMOL/L — SIGNIFICANT CHANGE UP (ref 96–108)
CK MB CFR SERPL CALC: 5.5 NG/ML — HIGH (ref 0–3.6)
CK MB CFR SERPL CALC: 5.5 NG/ML — HIGH (ref 0–3.6)
CO2 SERPL-SCNC: 31 MMOL/L — SIGNIFICANT CHANGE UP (ref 22–31)
CO2 SERPL-SCNC: 31 MMOL/L — SIGNIFICANT CHANGE UP (ref 22–31)
CREAT SERPL-MCNC: 5.81 MG/DL — HIGH (ref 0.5–1.3)
CREAT SERPL-MCNC: 5.81 MG/DL — HIGH (ref 0.5–1.3)
EGFR: 11 ML/MIN/1.73M2 — LOW
EGFR: 11 ML/MIN/1.73M2 — LOW
EOSINOPHIL # BLD AUTO: 0.81 K/UL — HIGH (ref 0–0.5)
EOSINOPHIL # BLD AUTO: 0.81 K/UL — HIGH (ref 0–0.5)
EOSINOPHIL NFR BLD AUTO: 5.9 % — SIGNIFICANT CHANGE UP (ref 0–6)
EOSINOPHIL NFR BLD AUTO: 5.9 % — SIGNIFICANT CHANGE UP (ref 0–6)
GLUCOSE SERPL-MCNC: 122 MG/DL — HIGH (ref 70–99)
GLUCOSE SERPL-MCNC: 122 MG/DL — HIGH (ref 70–99)
HCO3 BLDV-SCNC: 36 MMOL/L — HIGH (ref 22–29)
HCO3 BLDV-SCNC: 36 MMOL/L — HIGH (ref 22–29)
HCT VFR BLD CALC: 33.7 % — LOW (ref 39–50)
HCT VFR BLD CALC: 33.7 % — LOW (ref 39–50)
HGB BLD-MCNC: 11 G/DL — LOW (ref 13–17)
HGB BLD-MCNC: 11 G/DL — LOW (ref 13–17)
IMM GRANULOCYTES NFR BLD AUTO: 0.5 % — SIGNIFICANT CHANGE UP (ref 0–0.9)
IMM GRANULOCYTES NFR BLD AUTO: 0.5 % — SIGNIFICANT CHANGE UP (ref 0–0.9)
INR BLD: 1.13 RATIO — SIGNIFICANT CHANGE UP (ref 0.85–1.18)
INR BLD: 1.13 RATIO — SIGNIFICANT CHANGE UP (ref 0.85–1.18)
LACTATE SERPL-SCNC: 0.9 MMOL/L — SIGNIFICANT CHANGE UP (ref 0.7–2)
LACTATE SERPL-SCNC: 0.9 MMOL/L — SIGNIFICANT CHANGE UP (ref 0.7–2)
LYMPHOCYTES # BLD AUTO: 0.59 K/UL — LOW (ref 1–3.3)
LYMPHOCYTES # BLD AUTO: 0.59 K/UL — LOW (ref 1–3.3)
LYMPHOCYTES # BLD AUTO: 4.3 % — LOW (ref 13–44)
LYMPHOCYTES # BLD AUTO: 4.3 % — LOW (ref 13–44)
MCHC RBC-ENTMCNC: 32 PG — SIGNIFICANT CHANGE UP (ref 27–34)
MCHC RBC-ENTMCNC: 32 PG — SIGNIFICANT CHANGE UP (ref 27–34)
MCHC RBC-ENTMCNC: 32.6 GM/DL — SIGNIFICANT CHANGE UP (ref 32–36)
MCHC RBC-ENTMCNC: 32.6 GM/DL — SIGNIFICANT CHANGE UP (ref 32–36)
MCV RBC AUTO: 98 FL — SIGNIFICANT CHANGE UP (ref 80–100)
MCV RBC AUTO: 98 FL — SIGNIFICANT CHANGE UP (ref 80–100)
MONOCYTES # BLD AUTO: 0.89 K/UL — SIGNIFICANT CHANGE UP (ref 0–0.9)
MONOCYTES # BLD AUTO: 0.89 K/UL — SIGNIFICANT CHANGE UP (ref 0–0.9)
MONOCYTES NFR BLD AUTO: 6.5 % — SIGNIFICANT CHANGE UP (ref 2–14)
MONOCYTES NFR BLD AUTO: 6.5 % — SIGNIFICANT CHANGE UP (ref 2–14)
NEUTROPHILS # BLD AUTO: 11.29 K/UL — HIGH (ref 1.8–7.4)
NEUTROPHILS # BLD AUTO: 11.29 K/UL — HIGH (ref 1.8–7.4)
NEUTROPHILS NFR BLD AUTO: 82.4 % — HIGH (ref 43–77)
NEUTROPHILS NFR BLD AUTO: 82.4 % — HIGH (ref 43–77)
NRBC # BLD: 0 /100 WBCS — SIGNIFICANT CHANGE UP (ref 0–0)
NRBC # BLD: 0 /100 WBCS — SIGNIFICANT CHANGE UP (ref 0–0)
PCO2 BLDV: 49 MMHG — SIGNIFICANT CHANGE UP (ref 42–55)
PCO2 BLDV: 49 MMHG — SIGNIFICANT CHANGE UP (ref 42–55)
PH BLDV: 7.48 — HIGH (ref 7.32–7.43)
PH BLDV: 7.48 — HIGH (ref 7.32–7.43)
PLATELET # BLD AUTO: 200 K/UL — SIGNIFICANT CHANGE UP (ref 150–400)
PLATELET # BLD AUTO: 200 K/UL — SIGNIFICANT CHANGE UP (ref 150–400)
PO2 BLDV: 100 MMHG — SIGNIFICANT CHANGE UP
PO2 BLDV: 100 MMHG — SIGNIFICANT CHANGE UP
POTASSIUM SERPL-MCNC: 4.6 MMOL/L — SIGNIFICANT CHANGE UP (ref 3.5–5.3)
POTASSIUM SERPL-MCNC: 4.6 MMOL/L — SIGNIFICANT CHANGE UP (ref 3.5–5.3)
POTASSIUM SERPL-SCNC: 4.6 MMOL/L — SIGNIFICANT CHANGE UP (ref 3.5–5.3)
POTASSIUM SERPL-SCNC: 4.6 MMOL/L — SIGNIFICANT CHANGE UP (ref 3.5–5.3)
PROT SERPL-MCNC: 8 G/DL — SIGNIFICANT CHANGE UP (ref 6–8.3)
PROT SERPL-MCNC: 8 G/DL — SIGNIFICANT CHANGE UP (ref 6–8.3)
PROTHROM AB SERPL-ACNC: 12.8 SEC — SIGNIFICANT CHANGE UP (ref 9.5–13)
PROTHROM AB SERPL-ACNC: 12.8 SEC — SIGNIFICANT CHANGE UP (ref 9.5–13)
RAPID RVP RESULT: SIGNIFICANT CHANGE UP
RAPID RVP RESULT: SIGNIFICANT CHANGE UP
RBC # BLD: 3.44 M/UL — LOW (ref 4.2–5.8)
RBC # BLD: 3.44 M/UL — LOW (ref 4.2–5.8)
RBC # FLD: 14.2 % — SIGNIFICANT CHANGE UP (ref 10.3–14.5)
RBC # FLD: 14.2 % — SIGNIFICANT CHANGE UP (ref 10.3–14.5)
SAO2 % BLDV: 97.3 % — SIGNIFICANT CHANGE UP
SAO2 % BLDV: 97.3 % — SIGNIFICANT CHANGE UP
SARS-COV-2 RNA SPEC QL NAA+PROBE: SIGNIFICANT CHANGE UP
SARS-COV-2 RNA SPEC QL NAA+PROBE: SIGNIFICANT CHANGE UP
SODIUM SERPL-SCNC: 141 MMOL/L — SIGNIFICANT CHANGE UP (ref 135–145)
SODIUM SERPL-SCNC: 141 MMOL/L — SIGNIFICANT CHANGE UP (ref 135–145)
TROPONIN I, HIGH SENSITIVITY RESULT: 690.1 NG/L — HIGH
TROPONIN I, HIGH SENSITIVITY RESULT: 690.1 NG/L — HIGH
WBC # BLD: 13.71 K/UL — HIGH (ref 3.8–10.5)
WBC # BLD: 13.71 K/UL — HIGH (ref 3.8–10.5)
WBC # FLD AUTO: 13.71 K/UL — HIGH (ref 3.8–10.5)
WBC # FLD AUTO: 13.71 K/UL — HIGH (ref 3.8–10.5)

## 2023-11-02 PROCEDURE — 99223 1ST HOSP IP/OBS HIGH 75: CPT | Mod: GC

## 2023-11-02 PROCEDURE — 99291 CRITICAL CARE FIRST HOUR: CPT

## 2023-11-02 PROCEDURE — 71045 X-RAY EXAM CHEST 1 VIEW: CPT | Mod: 26

## 2023-11-02 PROCEDURE — 93010 ELECTROCARDIOGRAM REPORT: CPT

## 2023-11-02 RX ORDER — CARVEDILOL PHOSPHATE 80 MG/1
1 CAPSULE, EXTENDED RELEASE ORAL
Refills: 0 | DISCHARGE

## 2023-11-02 RX ORDER — ONDANSETRON 8 MG/1
4 TABLET, FILM COATED ORAL EVERY 8 HOURS
Refills: 0 | Status: DISCONTINUED | OUTPATIENT
Start: 2023-11-02 | End: 2023-11-06

## 2023-11-02 RX ORDER — ATORVASTATIN CALCIUM 80 MG/1
40 TABLET, FILM COATED ORAL AT BEDTIME
Refills: 0 | Status: DISCONTINUED | OUTPATIENT
Start: 2023-11-02 | End: 2023-11-06

## 2023-11-02 RX ORDER — LANOLIN ALCOHOL/MO/W.PET/CERES
3 CREAM (GRAM) TOPICAL AT BEDTIME
Refills: 0 | Status: DISCONTINUED | OUTPATIENT
Start: 2023-11-02 | End: 2023-11-06

## 2023-11-02 RX ORDER — NIFEDIPINE 30 MG
60 TABLET, EXTENDED RELEASE 24 HR ORAL DAILY
Refills: 0 | Status: DISCONTINUED | OUTPATIENT
Start: 2023-11-02 | End: 2023-11-06

## 2023-11-02 RX ORDER — ACETAMINOPHEN 500 MG
650 TABLET ORAL EVERY 6 HOURS
Refills: 0 | Status: DISCONTINUED | OUTPATIENT
Start: 2023-11-02 | End: 2023-11-06

## 2023-11-02 RX ORDER — TRAZODONE HCL 50 MG
1 TABLET ORAL
Refills: 0 | DISCHARGE

## 2023-11-02 RX ORDER — HEPARIN SODIUM 5000 [USP'U]/ML
5000 INJECTION INTRAVENOUS; SUBCUTANEOUS EVERY 12 HOURS
Refills: 0 | Status: DISCONTINUED | OUTPATIENT
Start: 2023-11-02 | End: 2023-11-06

## 2023-11-02 RX ORDER — LISINOPRIL 2.5 MG/1
20 TABLET ORAL DAILY
Refills: 0 | Status: DISCONTINUED | OUTPATIENT
Start: 2023-11-02 | End: 2023-11-06

## 2023-11-02 RX ORDER — CARVEDILOL PHOSPHATE 80 MG/1
6.25 CAPSULE, EXTENDED RELEASE ORAL EVERY 12 HOURS
Refills: 0 | Status: DISCONTINUED | OUTPATIENT
Start: 2023-11-02 | End: 2023-11-06

## 2023-11-02 RX ADMIN — CARVEDILOL PHOSPHATE 6.25 MILLIGRAM(S): 80 CAPSULE, EXTENDED RELEASE ORAL at 21:11

## 2023-11-02 RX ADMIN — Medication 60 MILLIGRAM(S): at 22:57

## 2023-11-02 RX ADMIN — HEPARIN SODIUM 5000 UNIT(S): 5000 INJECTION INTRAVENOUS; SUBCUTANEOUS at 21:12

## 2023-11-02 RX ADMIN — ATORVASTATIN CALCIUM 40 MILLIGRAM(S): 80 TABLET, FILM COATED ORAL at 22:57

## 2023-11-02 RX ADMIN — LISINOPRIL 20 MILLIGRAM(S): 2.5 TABLET ORAL at 22:58

## 2023-11-02 RX ADMIN — Medication 3 MILLIGRAM(S): at 22:57

## 2023-11-02 NOTE — ED ADULT NURSE NOTE - OBJECTIVE STATEMENT
Pt to ED wit PAU. Pt received on NRB and placed on bipap by respiratory. Pt with hx of CHF, dialysis M/W/F, with left AV fistula. PIV inserted, labs drawn and sent. Pt connected to cardiac monitor, scoping NSR.

## 2023-11-02 NOTE — PATIENT PROFILE ADULT - FALL HARM RISK - HARM RISK INTERVENTIONS

## 2023-11-02 NOTE — PATIENT PROFILE ADULT - FUNCTIONAL ASSESSMENT - DAILY ACTIVITY 6.
I discussed the patient with the resident, reviewed their findings, assessment, and plan, and agree with the content of their clinical note for today.    Briefly, patient is a 32 y/o female, who presents for follow-up of anxiety.    Anxiety - Was recently started on gabapentin, but does not like this. Was in the ER yesterday with anxiety and prescribed a short prescription of lorazepam. Discussed long psychiatry history. Would not start SSRI or BZDs due to history. Will restart on buspar, which has worked well for her in the past. Will also refer to [facility] to establish with behavioral health.     Please see resident note for more information.    Ashley Wu MD, MPH  Formerly named [facility] Faculty      4 = No assist / stand by assistance

## 2023-11-02 NOTE — H&P ADULT - PROBLEM SELECTOR PLAN 4
Neprhology Dr. Griffiths consulted  Appreciate [facility]  Social Work Consult

## 2023-11-02 NOTE — H&P ADULT - ASSESSMENT
53M from home, ambulates independently at baseline, A&OX3, with PMH of HTN, DM, anemia, ESRD on HD (M/W/F) minimal urine, CHF (EF 33% 10/23, follows at [facility] for cardiology), cirrhosis, and chronic recurrent ascites (goes to [facility] radiology once ever 3 weeks to get it drained) presenting with sudden shortness of breath at 1AM 11/2., HR 97, WBC 14K, cultures collected, CXR shows increasing hazy density right base; pitting edema, admitted for AHRF in the setting of CHF exacerbation, will admit for dialysis and rule out sepsis.

## 2023-11-02 NOTE — ED PROVIDER NOTE - CLINICAL SUMMARY MEDICAL DECISION MAKING FREE TEXT BOX
53 yr old male with hx of HTN, DM, anemia, ESRD on HD (M/W/F) left arm avf, CHF, cirrhosis, and chronic recurrent ascites (goes to [facility] radiology once ever 3 weeks to get it drained) presents to ed c/o sob and chest tightness this am after having taken his medications. +dry cough, pt did dialysis yesterday. no fever, no uri sx, no abd pain. EMS placed nonrebreather for 89% O2    resp distress r/o acute pulm edema vs pna vs viral uri vs acs vs pleural effusion vs chf vs anemia vs sepsis? vs pericarditis? labs, cxr, bipap, ekg, admit

## 2023-11-02 NOTE — ED ADULT NURSE REASSESSMENT NOTE - NS ED NURSE REASSESS COMMENT FT1
pt provided poc, pt provided meal before transfer to floor, vitals stable, pt 98% on room air, no other concerns are noted.

## 2023-11-02 NOTE — ED PROVIDER NOTE - PROGRESS NOTE DETAILS
dominguez: cp improve and sob improve. bipap removed. work up shows no pleural effusion. trop elevated but likely demand. dr melchor saw pt and will dialysis today to remove more fluid. pt also with ef ~ 30-35 likely main cause of sx. dominguez: cp improve and sob improve. bipap removed. work up shows no pleural effusion. trop elevated but likely demand. dr melchor saw pt and will dialysis today to remove more fluid. pt also with ef ~ 30-35 likely main cause of sx. trop higher than prior but possibly due to type II demand. pt now off bipap, cp improve and sob.

## 2023-11-02 NOTE — ED ADULT NURSE NOTE - NSFALLUNIVINTERV_ED_ALL_ED
Bed/Stretcher in lowest position, wheels locked, appropriate side rails in place/Call bell, personal items and telephone in reach/Instruct patient to call for assistance before getting out of bed/chair/stretcher/Non-slip footwear applied when patient is off stretcher/[place] to call system/Physically safe environment - no spills, clutter or unnecessary equipment/Purposeful proactive rounding/Room/bathroom lighting operational, light cord in reach

## 2023-11-02 NOTE — H&P ADULT - PROBLEM SELECTOR PLAN 1
Assessment:  - Pt appears clinically hypervolemic, likely cause of SoB  - Echo in 10/23 revealed LVEF 33%  - Makes minimal urine, thus will require HD for fluid removal  - BNP __K on admission  - CXR as above  Plan:  [] Admit to tele  [] Dialysis  [] Strict I&O  [] Daily weights  [] Fluid restriction  [] Cardiac/DASH Diet: Low salt, low cholesterol diet  [] Cardiology consult Assessment:  - Pt appears clinically hypervolemic, likely cause of SoB  - Echo in 10/23 revealed LVEF 33%  - Makes minimal urine, thus will require HD for fluid removal  - BNP __K on admission  - CXR as above  Plan:  [] Dialysis  [] Strict I&O  [] Daily weights  [] Fluid restriction  [] Cardiac/DASH Diet: Low salt, low cholesterol diet  [] Cardiology consult

## 2023-11-02 NOTE — CONSULT NOTE ADULT - ASSESSMENT
Plan for for UF today with UF 2.0kg and HD tomorrow as scheduled.  1. ESRD on HD (M/W/F)  -Plan for UF for 2.0kg for 2hrs today and HD orders were written and discussed with dialysis nurse.   -Plan tomorrow for his schedule HD session.   -Hemodialysis Renal Diet and Fluid restriction to 1L/day  -Adjust meds to eGFR and avoid IV Gadolinium contrast,NSAIDs, and phosphate enema.  -Monitor Electrolytes daily.  2. HTN:   -bp is acceptable at present.  -continue bp meds  -titrate bp meds to keep sbp >110 and < 130  3. Anemia of ESRD:  -order Epogen if hb <10  -F/u CBC daily  -transfuse if HB < 7.0.  4. Mineral Bone Disease:  -continue phoslo tid.   -monitor phos  5. Sob possible fluid overload with uncontrolled HTN  -Plan extra UF today  -consult cardiology r/o ACS.     Discussed with patient in detail regarding the renal plan and care.  d/w ER physician.      1. ESRD on HD (M/W/F)  -Plan for UF for 2.0kg for 2hrs today and HD orders were written and discussed with dialysis nurse.   -Plan tomorrow for his schedule HD session.   -Hemodialysis Renal Diet and Fluid restriction to 1L/day  -Adjust meds to eGFR and avoid IV Gadolinium contrast,NSAIDs, and phosphate enema.  -Monitor Electrolytes daily.  2. HTN:   -bp is acceptable at present.  -continue bp meds  -titrate bp meds to keep sbp >110 and < 130  3. Anemia of ESRD:  -order Epogen if hb <10  -F/u CBC daily  -transfuse if HB < 7.0.  4. Mineral Bone Disease:  -continue phoslo tid.   -monitor phos  5. Sob possible fluid overload with uncontrolled HTN  -Plan extra UF today  -consult cardiology r/o ACS.   -bipap as needed.    Discussed with patient in detail regarding the renal plan and care.  d/w ER physician.

## 2023-11-02 NOTE — H&P ADULT - PROBLEM SELECTOR PLAN 7
cirrhosis, and chronic recurrent ascites (goes to [facility] radiology once ever 3 weeks to get it drained  CT abdomen

## 2023-11-02 NOTE — ED ADULT NURSE REASSESSMENT NOTE - NS ED NURSE REASSESS COMMENT FT1
per provider pt has  ischemia, end stage renal disease, provider said no concern of elevated troponin, will f/u in AM

## 2023-11-02 NOTE — ED PROVIDER NOTE - OBJECTIVE STATEMENT
53 yr old male with hx of HTN, DM, anemia, ESRD on HD (M/W/F) left arm avf, CHF, cirrhosis, and chronic recurrent ascites (goes to [facility] radiology once ever 3 weeks to get it drained) presents to ed c/o sob and chest tightness this am after having taken his medications. +dry cough, pt did dialysis yesterday. no fever, no uri sx, no abd pain. EMS placed nonrebreather for 89% O2

## 2023-11-02 NOTE — ED PROVIDER NOTE - CRITICAL CARE ATTENDING CONTRIBUTION TO CARE
Hale: Due to the presence of resp distress and the risk of sudden rapid deterioration due to my attendance to this patient required critical care time of approx 40 minutes including assessment/reassessment, documentation, ordering and interpreting ancillary studies, discussion with ED staff and consultants, patient and their family, and excludes time spent in teaching of Residents and time spent on separately billable procedures.

## 2023-11-02 NOTE — H&P ADULT - NSHPREVIEWOFSYSTEMS_GEN_ALL_CORE
CONSTITUTIONAL: No fever, weight loss, or fatigue  RESPIRATORY: No cough, wheezing, chills or hemoptysis; No shortness of breath  CARDIOVASCULAR: No chest pain, palpitations, dizziness, or leg swelling  GASTROINTESTINAL: No abdominal pain. No nausea, vomiting, or hematemesis; No diarrhea or constipation. No melena or hematochezia.  GENITOURINARY: No dysuria or hematuria, urinary frequency  NEUROLOGICAL: No headaches, memory loss, loss of strength, numbness, or tremors  ENDOCRINE: No polyuria, polydipsia, or heat/cold intolerance  MUSKULOSKELETAL: No muscle aches, joint pains  HEME: no easy bruisability, no tender or enlarged lymph nodes  SKIN: No itching, burning, rashes, or lesions . CONSTITUTIONAL: Fatigued  RESPIRATORY: Shortness of breath  CARDIOVASCULAR: No chest pain, some leg swelling  GASTROINTESTINAL: No abdominal pain.   GENITOURINARY: No dysuria   MUSCULOSKELETAL: No muscle aches  HEME: no easy bruisability  SKIN: No itching

## 2023-11-02 NOTE — ED PROVIDER NOTE - CARDIAC, MLM
Normal rate, regular rhythm.  Heart sounds S1, S2.  No murmurs, rubs or gallops. left avf with + bruit/thrill
3

## 2023-11-02 NOTE — CONSULT NOTE ADULT - SUBJECTIVE AND OBJECTIVE BOX
NEPHROLOGY MEDICAL CARE, [facility] - Dr. Domenic Griffiths/ Dr. Bishnu Amador/ Dr. Fili Smiley/ Dr. Naomie Crowder    Date of Service: 11-02-23    Patient was seen and examined at bedside.    Consultation requested by:  Unknown Doctor    Reason for Consult: End Stage Renal Disease management    HPI:      PMH:   Type 2 diabetes mellitus    Hypertension    HLD (hyperlipidemia)    End stage renal disease    Bone spur    Anemia    Injury of right wrist    History of hyperkalemia    No pertinent past medical history        PSH:   S/P arthroscopy of right shoulder    History of vascular access device    H/O right wrist surgery    AV fistula        FAMILY HISTORY:  FH: hypertension  mother, father- alive    FH: type 2 diabetes  mother, father- alive        Social History:  non-smoker/ non-alcoholic     Home Meds:  Home Medications:  carvedilol 12.5 mg oral tablet: 1 tab(s) orally 2 times a day (02 Oct 2023 01:15)  glipiZIDE 10 mg oral tablet: 1 tab(s) orally once a day (02 Oct 2023 01:15)  traZODone 50 mg oral tablet: 1 tab(s) orally once a day (at bedtime) (02 Oct 2023 06:24)      Allergies:  Allergies    No Known Allergies    Intolerances        REVIEW OF SYSTEMS:  CONSTITUTIONAL: No fever; No weight loss; No fatigue  EYES: No eye pain; No visual disturbances; No discharge  ENMT:  No difficulty hearing; No tinnitus;  No vertigo; No sinus; No throat pain  NECK: No pain; No stiffness  BREASTS: No pain; No masses; No nipple discharge  RESPIRATORY: No cough; No wheezing; No chills; No hemoptysis; No shortness of breath  CARDIOVASCULAR: No chest pain; No palpitations; No dizziness; No leg swelling  GASTROINTESTINAL: No abdominal pain; No epigastric pain; No nausea; No vomiting; No hematemesis; No diarrhea; No constipation. No melena   GENITOURINARY: No dysuria No frequency; No hematuria; No incontinence  NEUROLOGICAL: No headaches; No memory loss; No loss of strength; No numbness; No tremors  SKIN: No itching; No burning; No rashes  ENDOCRINE: No heat or cold intolerance; No hair loss  MUSCULOSKELETAL: No joint pain or swelling; No muscle, back, or extremity pain  PSYCHIATRIC: No depression; No anxiety; No mood swings; No difficulty sleeping  HEME/LYMPH: No easy bruising; No bleeding gums  ALLERY AND IMMUNOLOGIC: No hives or eczema    Vital Signs Last 24 Hrs  T(C): 37.3 (02 Nov 2023 08:14), Max: 37.3 (02 Nov 2023 08:14)  T(F): 99.2 (02 Nov 2023 08:14), Max: 99.2 (02 Nov 2023 08:14)  HR: 81 (02 Nov 2023 09:47) (81 - 97)  BP: 190/91 (02 Nov 2023 08:14) (190/91 - 190/91)  BP(mean): --  RR: 23 (02 Nov 2023 08:14) (23 - 23)  SpO2: 96% (02 Nov 2023 08:14) (96% - 96%)    Parameters below as of 02 Nov 2023 08:14  Patient On (Oxygen Delivery Method): mask, nonrebreather  O2 Flow (L/min): 15        PHYSICAL EXAM:  General: No acute respiratory distress.  Eyes: conjunctiva and sclera clear  ENMT: Atraumatic, Normocephalic, supple, No JVD present. Moist mucous membranes  Respiratory:   Bilaterally clear lungs; No rales, rhonchi, wheezing  Cardiovascular: S1S2+; no m/r/g  Gastrointestinal: Soft, Non-tender, Nondistended; Bowel sounds present, no hepatosplenomegaly.   Neuro:  Awake, Alert & Oriented X3, No focal deficits present.   Ext:  2+ Peripheral Pulses and No edema, No Cyanosis  Skin: No rashes  Back: No CVA tenderness.  Dialysis Access::  AVF thrill/bruit+ or AVG thrill/bruit+ or PERMACATH    LABS:                        11.0   13.71 )-----------( 200      ( 02 Nov 2023 09:30 )             33.7     11-02    141  |  103  |  45<H>  ----------------------------<  122<H>  4.6   |  31  |  5.81<H>    Ca    8.6      02 Nov 2023 09:30    TPro  8.0  /  Alb  3.3<L>  /  TBili  0.5  /  DBili  x   /  AST  21  /  ALT  18  /  AlkPhos  99  11-02    PT/INR - ( 02 Nov 2023 09:30 )   PT: 12.8 sec;   INR: 1.13 ratio         PTT - ( 02 Nov 2023 09:30 )  PTT:34.7 sec  Urinalysis Basic - ( 02 Nov 2023 09:30 )    Color: x / Appearance: x / SG: x / pH: x  Gluc: 122 mg/dL / Ketone: x  / Bili: x / Urobili: x   Blood: x / Protein: x / Nitrite: x   Leuk Esterase: x / RBC: x / WBC x   Sq Epi: x / Non Sq Epi: x / Bacteria: x        Urine studies      Medications:         NEPHROLOGY MEDICAL CARE, [facility] - Dr. Domenic Griffiths/ Dr. Bishnu Amador/ Dr. Fili Smiley/ Dr. Naomie Crowder    Date of Service: 11-02-23    Patient was seen and examined at bedside.    Consultation requested by:  Unknown Doctor    Reason for Consult: End Stage Renal Disease management    HPI:  52 y/o male with PMH ESRD on HD (MWF at [facility], Nephro: Dr. Griffiths), HTN, HFrEF (30 to 35%), Ascites due to Heart Failure, DM came for elevated bp and sob. Renal consulted for ESRD management. Patient last HD yesterday with UF 2.5kg and reached his edw (91.0kg) and his bp was stable around 130s. Patient's edw can be falsely elevated when patient has too much ascites and pt feel weak and drained in the past when trying to challenge his edw in the past.  Patient states his bp became elevated to 200s and took extra bp meds then became sob so called 911. Patient also gets monthly paracentesis for the ascites at [facility] with 5 to 6L every session. Patient does makes urine but not much. Patient was placed on BiPAP in the ER and patient feel better immediately and his bp improved to 140s. Pt also states he has chest pain during the elevated bp. denies nausea, vomiting, diarrhea and urinary symptoms.     PMH:   Type 2 diabetes mellitus    Hypertension    HLD (hyperlipidemia)    End stage renal disease    Bone spur    Anemia    Injury of right wrist    History of hyperkalemia            PSH:   S/P arthroscopy of right shoulder    History of vascular access device    H/O right wrist surgery    AV fistula        FAMILY HISTORY:  FH: hypertension  mother, father- alive    FH: type 2 diabetes  mother, father- alive        Social History:  non-smoker/ non-alcoholic     Home Meds:  Home Medications:  carvedilol 12.5 mg oral tablet: 1 tab(s) orally 2 times a day (02 Oct 2023 01:15)  glipiZIDE 10 mg oral tablet: 1 tab(s) orally once a day (02 Oct 2023 01:15)  traZODone 50 mg oral tablet: 1 tab(s) orally once a day (at bedtime) (02 Oct 2023 06:24)      Allergies:  Allergies    No Known Allergies    Intolerances        REVIEW OF SYSTEMS:  CONSTITUTIONAL: No fever; No weight loss; No fatigue  EYES: No eye pain; No visual disturbances; No discharge  ENMT:  No difficulty hearing; No tinnitus;  No vertigo; No sinus; No throat pain  NECK: No pain; No stiffness  BREASTS: No pain; No masses; No nipple discharge  RESPIRATORY: No cough; No wheezing; No chills; No hemoptysis;  shortness of breath  CARDIOVASCULAR:  chest pain; No palpitations; No dizziness; No leg swelling  GASTROINTESTINAL: No abdominal pain; No epigastric pain; No nausea; No vomiting; No hematemesis; No diarrhea; No constipation. No melena   GENITOURINARY: No dysuria No frequency; No hematuria; No incontinence  NEUROLOGICAL: No headaches; No memory loss; No loss of strength; No numbness; No tremors  SKIN: No itching; No burning; No rashes  ENDOCRINE: No heat or cold intolerance; No hair loss  MUSCULOSKELETAL: No joint pain or swelling; No muscle, back, or extremity pain  PSYCHIATRIC: No depression; No anxiety; No mood swings; No difficulty sleeping  HEME/LYMPH: No easy bruising; No bleeding gums  ALLERY AND IMMUNOLOGIC: No hives or eczema    Vital Signs Last 24 Hrs  T(C): 37.3 (02 Nov 2023 08:14), Max: 37.3 (02 Nov 2023 08:14)  T(F): 99.2 (02 Nov 2023 08:14), Max: 99.2 (02 Nov 2023 08:14)  HR: 81 (02 Nov 2023 09:47) (81 - 97)  BP: 190/91 (02 Nov 2023 08:14) (190/91 - 190/91)  BP(mean): --  RR: 23 (02 Nov 2023 08:14) (23 - 23)  SpO2: 96% (02 Nov 2023 08:14) (96% - 96%)    Parameters below as of 02 Nov 2023 08:14  Patient On (Oxygen Delivery Method): mask, nonrebreather  O2 Flow (L/min): 15        PHYSICAL EXAM:  General: No acute respiratory distress.  Eyes: conjunctiva and sclera clear  ENMT: Atraumatic, Normocephalic, supple, No JVD present. Moist mucous membranes  Respiratory:   Bilaterally poor lungs; No rales, rhonchi, wheezing  Cardiovascular: S1S2+; no m/r/g  Gastrointestinal: Soft, Non-tender, distended; Bowel sounds presen  Neuro:  Awake, Alert & Oriented X3, No focal deficits present.   Ext:  2+ Peripheral Pulses and 3+ pedal edema, No Cyanosis  Skin: No rashes  Back: No CVA tenderness.  Dialysis Access::  AVF thrill/bruit+ or AVG thrill/bruit+ or PERMACATH    LABS:                        11.0   13.71 )-----------( 200      ( 02 Nov 2023 09:30 )             33.7     11-02    141  |  103  |  45<H>  ----------------------------<  122<H>  4.6   |  31  |  5.81<H>    Ca    8.6      02 Nov 2023 09:30    TPro  8.0  /  Alb  3.3<L>  /  TBili  0.5  /  DBili  x   /  AST  21  /  ALT  18  /  AlkPhos  99  11-02    PT/INR - ( 02 Nov 2023 09:30 )   PT: 12.8 sec;   INR: 1.13 ratio         PTT - ( 02 Nov 2023 09:30 )  PTT:34.7 sec  Urinalysis Basic - ( 02 Nov 2023 09:30 )    Color: x / Appearance: x / SG: x / pH: x  Gluc: 122 mg/dL / Ketone: x  / Bili: x / Urobili: x   Blood: x / Protein: x / Nitrite: x   Leuk Esterase: x / RBC: x / WBC x   Sq Epi: x / Non Sq Epi: x / Bacteria: x        Urine studies      Medications:         NEPHROLOGY MEDICAL CARE, [facility] - Dr. Domenic Griffiths/ Dr. Bishnu Amador/ Dr. Fili Smiley/ Dr. Naomie Crowder    Date of Service: 11-02-23    Patient was seen and examined at bedside.    Consultation requested by:  Unknown Doctor    Reason for Consult: End Stage Renal Disease management    HPI:  52 y/o male with PMH ESRD on HD (MWF at [facility], Nephro: Dr. Griffiths), HTN, HFrEF (30 to 35%), Ascites due to Heart Failure, DM came for elevated bp and sob. Renal consulted for ESRD management. Patient last HD yesterday with UF 2.5kg and reached his edw (91.0kg) and his bp was stable around 130s. Patient's edw can be falsely elevated when patient has too much ascites and pt feel weak and drained in the past when trying to challenge his edw in the past.  Patient states his bp became elevated to 200s and took extra bp meds then became sob so called 911. Patient also gets monthly paracentesis for the ascites at [facility] with 5 to 6L every session. Patient does makes urine but not much. Patient was placed on BiPAP in the ER and patient feel better immediately and his bp improved to 140s. Pt also states he has chest pain during the elevated bp. denies nausea, vomiting, diarrhea and urinary symptoms.     PMH:   Type 2 diabetes mellitus    Hypertension    HLD (hyperlipidemia)    End stage renal disease    Bone spur    Anemia    Injury of right wrist    History of hyperkalemia            PSH:   S/P arthroscopy of right shoulder    History of vascular access device    H/O right wrist surgery    AV fistula        FAMILY HISTORY:  FH: hypertension  mother, father- alive    FH: type 2 diabetes  mother, father- alive        Social History:  non-smoker/ non-alcoholic     Home Meds:  Home Medications:  carvedilol 12.5 mg oral tablet: 1 tab(s) orally 2 times a day (02 Oct 2023 01:15)  glipiZIDE 10 mg oral tablet: 1 tab(s) orally once a day (02 Oct 2023 01:15)  traZODone 50 mg oral tablet: 1 tab(s) orally once a day (at bedtime) (02 Oct 2023 06:24)      Allergies:  Allergies    No Known Allergies    Intolerances        REVIEW OF SYSTEMS:  CONSTITUTIONAL: No fever; No weight loss; No fatigue  EYES: No eye pain; No visual disturbances; No discharge  ENMT:  No difficulty hearing; No tinnitus;  No vertigo; No sinus; No throat pain  NECK: No pain; No stiffness  BREASTS: No pain; No masses; No nipple discharge  RESPIRATORY: No cough; No wheezing; No chills; No hemoptysis;  shortness of breath  CARDIOVASCULAR:  chest pain; No palpitations; No dizziness; No leg swelling  GASTROINTESTINAL: No abdominal pain; No epigastric pain; No nausea; No vomiting; No hematemesis; No diarrhea; No constipation. No melena   GENITOURINARY: No dysuria No frequency; No hematuria; No incontinence  NEUROLOGICAL: No headaches; No memory loss; No loss of strength; No numbness; No tremors  SKIN: No itching; No burning; No rashes  ENDOCRINE: No heat or cold intolerance; No hair loss  MUSCULOSKELETAL: No joint pain or swelling; No muscle, back, or extremity pain  PSYCHIATRIC: No depression; No anxiety; No mood swings; No difficulty sleeping  HEME/LYMPH: No easy bruising; No bleeding gums  ALLERY AND IMMUNOLOGIC: No hives or eczema    Vital Signs Last 24 Hrs  T(C): 37.3 (02 Nov 2023 08:14), Max: 37.3 (02 Nov 2023 08:14)  T(F): 99.2 (02 Nov 2023 08:14), Max: 99.2 (02 Nov 2023 08:14)  HR: 81 (02 Nov 2023 09:47) (81 - 97)  BP: 190/91 (02 Nov 2023 08:14) (190/91 - 190/91)  BP(mean): --  RR: 23 (02 Nov 2023 08:14) (23 - 23)  SpO2: 96% (02 Nov 2023 08:14) (96% - 96%)    Parameters below as of 02 Nov 2023 08:14  Patient On (Oxygen Delivery Method): mask, nonrebreather  O2 Flow (L/min): 15        PHYSICAL EXAM:  General: No acute respiratory distress.  Eyes: conjunctiva and sclera clear  ENMT: Atraumatic, Normocephalic, supple, No JVD present. Moist mucous membranes  Respiratory:   Bilaterally poor lungs; No rales, rhonchi, wheezing  Cardiovascular: S1S2+; no m/r/g  Gastrointestinal: Soft, Non-tender, distended; Bowel sounds presen  Neuro:  Awake, Alert & Oriented X3, No focal deficits present.   Ext:  2+ Peripheral Pulses and 3+ pedal edema, No Cyanosis  Skin: No rashes  Back: No CVA tenderness.  Dialysis Access::  Left upper arm AVF thrill/bruit+     LABS:                        11.0   13.71 )-----------( 200      ( 02 Nov 2023 09:30 )             33.7     11-02    141  |  103  |  45<H>  ----------------------------<  122<H>  4.6   |  31  |  5.81<H>    Ca    8.6      02 Nov 2023 09:30    TPro  8.0  /  Alb  3.3<L>  /  TBili  0.5  /  DBili  x   /  AST  21  /  ALT  18  /  AlkPhos  99  11-02    PT/INR - ( 02 Nov 2023 09:30 )   PT: 12.8 sec;   INR: 1.13 ratio         PTT - ( 02 Nov 2023 09:30 )  PTT:34.7 sec  Urinalysis Basic - ( 02 Nov 2023 09:30 )    Color: x / Appearance: x / SG: x / pH: x  Gluc: 122 mg/dL / Ketone: x  / Bili: x / Urobili: x   Blood: x / Protein: x / Nitrite: x   Leuk Esterase: x / RBC: x / WBC x   Sq Epi: x / Non Sq Epi: x / Bacteria: x        Urine studies      Medications:

## 2023-11-02 NOTE — ED PROVIDER NOTE - CONSTITUTIONAL, MLM
normal... awake, alert, oriented to person, place, time/situation and in mild apparent distress. sitting up with NRBM

## 2023-11-02 NOTE — H&P ADULT - NSHPPHYSICALEXAM_GEN_ALL_CORE
GENERAL: NAD  HEAD:  Atraumatic, Normocephalic  No JVD; Normal thyroid; Trachea midline  NERVOUS SYSTEM:  Alert & Oriented X3  CHEST/LUNG: Crackles  HEART: Regular rate and rhythm  ABDOMEN: Soft, Nontender, Nondistended  EXTREMITIES: +1edema  SKIN: No rashes or lesions;  Good capillary refill

## 2023-11-02 NOTE — H&P ADULT - ATTENDING COMMENTS
#AHRF 2/2 flash pulmonary edema   #ESRD on HD (MWF)  #Type 2 DM   #Anemia of chronic disease  #HFrEF (35%)  #Liver cirrhosis  #Ascites requiring frequent paracentesis     53yM with extensive past medical history including type 2 DM, anemia of chronic disease, HFrEF (33%), ESRD on HD (MWF), who presented with acute onset shortness of breath and an episode of vomiting. Admitted for flash pulmonary edema.   Patient seen at bedside on 11/02/23. Stated he follows with Dr. Griffiths for dialysis, and has never missed a session; last session was the day prior to admission. Reports that he makes urine once a week. The evening after dialysis, patient was eating pizza and cherry flavored jelly with peaches (red colored), and subsequently developed acute shortness of breath, stating he felt like he was drowning, and proceeded to vomit. He states that the emesis was all red, but is unsure whether it was blood or the jello he ate. Upon arrival of EMS, patient was told he was saturating int he high 80s, and supplemental oxygen was not helping. He was put on BiPAP on the ED, and noted improvement at that time. ROS otherwise negative.     FH: mother - diabetes  Social Hx; history of cocaine use, smoking (1pk/week), quit in 2019; drinks alcohol socially     PE: as above; mild crackles in the b/l lower lobes, 2+ pitting edema b/l LE  Labs and imaging reviewed by me: CBC, BMP, CXR    Plan:   -Dr. Griffiths following, planned for HD on 11/03, per schedule   -Daily weight, monitor intake/output; per patient, he makes urine weekly   -Titrate oxygen to keep saturation >92%, initially required BiPAP, at time of evaluation was on 3L NC  -Sepsis criteria met based on elevated WBC, HR >90, and possible infiltrate on CXR, however, low suspicion for infection, will hold on antibiotics for now and obtain procalcitonin  -ACHS FS, ISS  -Chronic ascites, patient follows OP for paracentesis [id number]  -Continue meds for chronic conditions   -SW consult for dialysis reinstatement   -F/U BCx, procalcitonin  -DVT ppx

## 2023-11-02 NOTE — H&P ADULT - HISTORY OF PRESENT ILLNESS
53M from home, ambulates independently at baseline, A&OX3, with PMH of HTN, DM, anemia, ESRD on HD (M/W/F), CHF (EF 35% for many years), cirrhosis, and chronic recurrent ascites (goes to [facility] radiology once ever 3 weeks to get it drained) 53M from home, ambulates independently at baseline, A&OX3, with PMH of HTN, DM, anemia, ESRD on HD (M/W/F) minimal urine, CHF (EF 33% 10/23, follows at [facility] for cardiology), cirrhosis, and chronic recurrent ascites (goes to [facility] radiology once ever 3 weeks to get it drained) presenting with sudden shortness of breath at 1AM 11/2. Patient states that he was eating pizza, cherry jello at the time, then suddenly felt like he was drowning. He noted to cough up bright red substance as well. He has been compliant with dialysis where he usually leaves fluid negative.     Patient denies fevers, chills, abdominal pain, nausea, or vomiting. No diarrhea. No acute weakness.    In the ED, HR 97, WBC 14K, cultures collected, CXR shows There is an increasing hazy density right base suggesting infiltration has effusion and slight increase left base effusion as well. RVP negative. Nephrology Dr. Griffiths was consulted; patient to receive dialysis

## 2023-11-02 NOTE — ED ADULT TRIAGE NOTE - CHIEF COMPLAINT QUOTE
Pt with hx. of CHF, dialysis M/W/F, AV fistula to Lt arm c/o difficulty breathing and cough x today. As per EMS, noted saturation 89%RA.

## 2023-11-02 NOTE — ED PROVIDER NOTE - CADM POA URETHRAL CATHETER
PATIENT NAME Radha Bautista M.D. SURGERY DATE  3/29/2019 10:57 AM    AGE 61 y.o. PATIENT TYPE female    PRE-OPERATIVE DIAGNOSIS: right knee osteoarthritis    POST-OPERATIVE DIAGNOSIS: right knee osteoarthritis    PROCEDURE PERFORMED: right total knee replacement using robotic assistance ([employer]); all three components were press-fit ; posterior cruciate retaining implants. Median parapatellar arthrotomy; with lateral retinacular release. Implants used:  JOE TRIATHALON TKR SYSTEM WITH:    Size 3 CR Femoral component                          Size 3 primary tibial baseplate                          Size 9 mm X3 polyethylene CS tibial insert                          Size 31 x9 mm symmetric patellar component    FIRST ASSISTANT: Bria Chawla PA-C    ANESTHESIA: spinal with adductor canal nerve block. COMPLICATIONS: None apparent. POST-OP CONDITION:  To recovery room. SPECIMENS: BONE    EBL: MINIMAL    INDICATIONS FOR SURGERY: The patient is a 61y.o.-year-old female with a long history of knee pain affecting the activities of daily living. Pt had severe pain inability to ambulate, night pain and frequent falls. The pain was refractory to conservative management including injections (corticosteroid/viscosupplementation); PT, Ambulatory aids; oral medication (NSAIDs and pain medication) and bracing for 3 - 6 months. Preop workup showed radiographic changes with osteophyte formation; loss of cartilage space; subchondral sclerosis and deformity. The patient is scheduled for a right total knee replacement. The risks, benefits and alternatives of surgery were clearly discussed and the patient wished to proceed with surgery. OPERATIVE FINDINGS: severe osteoarthritis with tricompartmental involvement and varus deformity. Complete loss of articular cartilage, osteophyte formation and severe synovitis.    DETAILS OF PROCEDURE: The patient was brought to the operating room, and balance both medial and laterally. The patella  tracked centrally with  lateral retinacular release. Next, the posterior osteophytes and meniscal remnants were resected. The pain cocktail was injected into the posterior capsule of the medial and lateral gutters. Final range of motion: 0 extension to 130 degrees of flexion. Next all trials were removed. The femoral and tibia array pins were removed. The cut bony surfaces were irrigated with copious amounts of irrigation solution. The tibial, femoral and patellar components were press-fit in place. The insert was then placed. The knee was held in extension. The knee was irrigated with 5 liters of irrigation solution using pulsatile lavage. The tourniquet was let down before closure. Any bleeding vessels were coagulated using the Bovie cautery. The knee was then closed in layers using #2 Ethibond to close the extensor mechanism, a combination of 0- and 2-0 Monocryl to close the subcutaneous tissue and 4-0 Monocryl to close the skin. Dermaflex was applied to seal the wound and allowed to dry before a dry sterile compression dressing was applied. The patient was then taken to recovery in stable condition having tolerated the procedure well.     Tony Garrett M.D. No

## 2023-11-03 DIAGNOSIS — R04.2 HEMOPTYSIS: ICD-10-CM

## 2023-11-03 DIAGNOSIS — R79.89 OTHER SPECIFIED ABNORMAL FINDINGS OF BLOOD CHEMISTRY: ICD-10-CM

## 2023-11-03 DIAGNOSIS — Z29.9 ENCOUNTER FOR PROPHYLACTIC MEASURES, UNSPECIFIED: ICD-10-CM

## 2023-11-03 LAB
ANION GAP SERPL CALC-SCNC: 6 MMOL/L — SIGNIFICANT CHANGE UP (ref 5–17)
ANION GAP SERPL CALC-SCNC: 6 MMOL/L — SIGNIFICANT CHANGE UP (ref 5–17)
BASOPHILS # BLD AUTO: 0.05 K/UL — SIGNIFICANT CHANGE UP (ref 0–0.2)
BASOPHILS # BLD AUTO: 0.05 K/UL — SIGNIFICANT CHANGE UP (ref 0–0.2)
BASOPHILS NFR BLD AUTO: 0.4 % — SIGNIFICANT CHANGE UP (ref 0–2)
BASOPHILS NFR BLD AUTO: 0.4 % — SIGNIFICANT CHANGE UP (ref 0–2)
BUN SERPL-MCNC: 63 MG/DL — HIGH (ref 7–18)
BUN SERPL-MCNC: 63 MG/DL — HIGH (ref 7–18)
CALCIUM SERPL-MCNC: 8.4 MG/DL — SIGNIFICANT CHANGE UP (ref 8.4–10.5)
CALCIUM SERPL-MCNC: 8.4 MG/DL — SIGNIFICANT CHANGE UP (ref 8.4–10.5)
CHLORIDE SERPL-SCNC: 102 MMOL/L — SIGNIFICANT CHANGE UP (ref 96–108)
CHLORIDE SERPL-SCNC: 102 MMOL/L — SIGNIFICANT CHANGE UP (ref 96–108)
CO2 SERPL-SCNC: 32 MMOL/L — HIGH (ref 22–31)
CO2 SERPL-SCNC: 32 MMOL/L — HIGH (ref 22–31)
CREAT SERPL-MCNC: 7.34 MG/DL — HIGH (ref 0.5–1.3)
CREAT SERPL-MCNC: 7.34 MG/DL — HIGH (ref 0.5–1.3)
EGFR: 8 ML/MIN/1.73M2 — LOW
EGFR: 8 ML/MIN/1.73M2 — LOW
EOSINOPHIL # BLD AUTO: 0.94 K/UL — HIGH (ref 0–0.5)
EOSINOPHIL # BLD AUTO: 0.94 K/UL — HIGH (ref 0–0.5)
EOSINOPHIL NFR BLD AUTO: 8.2 % — HIGH (ref 0–6)
EOSINOPHIL NFR BLD AUTO: 8.2 % — HIGH (ref 0–6)
GLUCOSE BLDC GLUCOMTR-MCNC: 84 MG/DL — SIGNIFICANT CHANGE UP (ref 70–99)
GLUCOSE BLDC GLUCOMTR-MCNC: 84 MG/DL — SIGNIFICANT CHANGE UP (ref 70–99)
GLUCOSE BLDC GLUCOMTR-MCNC: 89 MG/DL — SIGNIFICANT CHANGE UP (ref 70–99)
GLUCOSE BLDC GLUCOMTR-MCNC: 89 MG/DL — SIGNIFICANT CHANGE UP (ref 70–99)
GLUCOSE SERPL-MCNC: 110 MG/DL — HIGH (ref 70–99)
GLUCOSE SERPL-MCNC: 110 MG/DL — HIGH (ref 70–99)
HAV IGM SER-ACNC: SIGNIFICANT CHANGE UP
HAV IGM SER-ACNC: SIGNIFICANT CHANGE UP
HBV CORE IGM SER-ACNC: SIGNIFICANT CHANGE UP
HBV CORE IGM SER-ACNC: SIGNIFICANT CHANGE UP
HBV SURFACE AG SER-ACNC: SIGNIFICANT CHANGE UP
HBV SURFACE AG SER-ACNC: SIGNIFICANT CHANGE UP
HCT VFR BLD CALC: 29.5 % — LOW (ref 39–50)
HCT VFR BLD CALC: 29.5 % — LOW (ref 39–50)
HCV AB S/CO SERPL IA: 0.1 S/CO — SIGNIFICANT CHANGE UP (ref 0–0.99)
HCV AB S/CO SERPL IA: 0.1 S/CO — SIGNIFICANT CHANGE UP (ref 0–0.99)
HCV AB SERPL-IMP: SIGNIFICANT CHANGE UP
HCV AB SERPL-IMP: SIGNIFICANT CHANGE UP
HGB BLD-MCNC: 9.6 G/DL — LOW (ref 13–17)
HGB BLD-MCNC: 9.6 G/DL — LOW (ref 13–17)
IMM GRANULOCYTES NFR BLD AUTO: 0.3 % — SIGNIFICANT CHANGE UP (ref 0–0.9)
IMM GRANULOCYTES NFR BLD AUTO: 0.3 % — SIGNIFICANT CHANGE UP (ref 0–0.9)
LYMPHOCYTES # BLD AUTO: 0.79 K/UL — LOW (ref 1–3.3)
LYMPHOCYTES # BLD AUTO: 0.79 K/UL — LOW (ref 1–3.3)
LYMPHOCYTES # BLD AUTO: 6.9 % — LOW (ref 13–44)
LYMPHOCYTES # BLD AUTO: 6.9 % — LOW (ref 13–44)
MAGNESIUM SERPL-MCNC: 2.2 MG/DL — SIGNIFICANT CHANGE UP (ref 1.6–2.6)
MAGNESIUM SERPL-MCNC: 2.2 MG/DL — SIGNIFICANT CHANGE UP (ref 1.6–2.6)
MCHC RBC-ENTMCNC: 32 PG — SIGNIFICANT CHANGE UP (ref 27–34)
MCHC RBC-ENTMCNC: 32 PG — SIGNIFICANT CHANGE UP (ref 27–34)
MCHC RBC-ENTMCNC: 32.5 GM/DL — SIGNIFICANT CHANGE UP (ref 32–36)
MCHC RBC-ENTMCNC: 32.5 GM/DL — SIGNIFICANT CHANGE UP (ref 32–36)
MCV RBC AUTO: 98.3 FL — SIGNIFICANT CHANGE UP (ref 80–100)
MCV RBC AUTO: 98.3 FL — SIGNIFICANT CHANGE UP (ref 80–100)
MONOCYTES # BLD AUTO: 0.87 K/UL — SIGNIFICANT CHANGE UP (ref 0–0.9)
MONOCYTES # BLD AUTO: 0.87 K/UL — SIGNIFICANT CHANGE UP (ref 0–0.9)
MONOCYTES NFR BLD AUTO: 7.6 % — SIGNIFICANT CHANGE UP (ref 2–14)
MONOCYTES NFR BLD AUTO: 7.6 % — SIGNIFICANT CHANGE UP (ref 2–14)
NEUTROPHILS # BLD AUTO: 8.75 K/UL — HIGH (ref 1.8–7.4)
NEUTROPHILS # BLD AUTO: 8.75 K/UL — HIGH (ref 1.8–7.4)
NEUTROPHILS NFR BLD AUTO: 76.6 % — SIGNIFICANT CHANGE UP (ref 43–77)
NEUTROPHILS NFR BLD AUTO: 76.6 % — SIGNIFICANT CHANGE UP (ref 43–77)
NRBC # BLD: 0 /100 WBCS — SIGNIFICANT CHANGE UP (ref 0–0)
NRBC # BLD: 0 /100 WBCS — SIGNIFICANT CHANGE UP (ref 0–0)
PHOSPHATE SERPL-MCNC: 3.6 MG/DL — SIGNIFICANT CHANGE UP (ref 2.5–4.5)
PHOSPHATE SERPL-MCNC: 3.6 MG/DL — SIGNIFICANT CHANGE UP (ref 2.5–4.5)
PLATELET # BLD AUTO: 158 K/UL — SIGNIFICANT CHANGE UP (ref 150–400)
PLATELET # BLD AUTO: 158 K/UL — SIGNIFICANT CHANGE UP (ref 150–400)
POTASSIUM SERPL-MCNC: 4.9 MMOL/L — SIGNIFICANT CHANGE UP (ref 3.5–5.3)
POTASSIUM SERPL-MCNC: 4.9 MMOL/L — SIGNIFICANT CHANGE UP (ref 3.5–5.3)
POTASSIUM SERPL-SCNC: 4.9 MMOL/L — SIGNIFICANT CHANGE UP (ref 3.5–5.3)
POTASSIUM SERPL-SCNC: 4.9 MMOL/L — SIGNIFICANT CHANGE UP (ref 3.5–5.3)
RBC # BLD: 3 M/UL — LOW (ref 4.2–5.8)
RBC # BLD: 3 M/UL — LOW (ref 4.2–5.8)
RBC # FLD: 14.2 % — SIGNIFICANT CHANGE UP (ref 10.3–14.5)
RBC # FLD: 14.2 % — SIGNIFICANT CHANGE UP (ref 10.3–14.5)
SODIUM SERPL-SCNC: 140 MMOL/L — SIGNIFICANT CHANGE UP (ref 135–145)
SODIUM SERPL-SCNC: 140 MMOL/L — SIGNIFICANT CHANGE UP (ref 135–145)
TROPONIN I, HIGH SENSITIVITY RESULT: 1967.8 NG/L — HIGH
TROPONIN I, HIGH SENSITIVITY RESULT: 1967.8 NG/L — HIGH
TROPONIN I, HIGH SENSITIVITY RESULT: 2870.9 NG/L — HIGH
TROPONIN I, HIGH SENSITIVITY RESULT: 2870.9 NG/L — HIGH
TSH SERPL-MCNC: 1.56 UU/ML — SIGNIFICANT CHANGE UP (ref 0.34–4.82)
TSH SERPL-MCNC: 1.56 UU/ML — SIGNIFICANT CHANGE UP (ref 0.34–4.82)
WBC # BLD: 11.44 K/UL — HIGH (ref 3.8–10.5)
WBC # BLD: 11.44 K/UL — HIGH (ref 3.8–10.5)
WBC # FLD AUTO: 11.44 K/UL — HIGH (ref 3.8–10.5)
WBC # FLD AUTO: 11.44 K/UL — HIGH (ref 3.8–10.5)

## 2023-11-03 PROCEDURE — 99233 SBSQ HOSP IP/OBS HIGH 50: CPT | Mod: GC

## 2023-11-03 PROCEDURE — 99223 1ST HOSP IP/OBS HIGH 75: CPT | Mod: GC,25

## 2023-11-03 PROCEDURE — 99406 BEHAV CHNG SMOKING 3-10 MIN: CPT

## 2023-11-03 RX ORDER — CALCIUM ACETATE 667 MG
667 TABLET ORAL
Refills: 0 | Status: DISCONTINUED | OUTPATIENT
Start: 2023-11-03 | End: 2023-11-06

## 2023-11-03 RX ORDER — INSULIN LISPRO 100/ML
VIAL (ML) SUBCUTANEOUS AT BEDTIME
Refills: 0 | Status: DISCONTINUED | OUTPATIENT
Start: 2023-11-03 | End: 2023-11-06

## 2023-11-03 RX ORDER — INSULIN LISPRO 100/ML
VIAL (ML) SUBCUTANEOUS
Refills: 0 | Status: DISCONTINUED | OUTPATIENT
Start: 2023-11-03 | End: 2023-11-06

## 2023-11-03 RX ORDER — CHLORHEXIDINE GLUCONATE 213 G/1000ML
1 SOLUTION TOPICAL DAILY
Refills: 0 | Status: DISCONTINUED | OUTPATIENT
Start: 2023-11-03 | End: 2023-11-06

## 2023-11-03 RX ADMIN — CARVEDILOL PHOSPHATE 6.25 MILLIGRAM(S): 80 CAPSULE, EXTENDED RELEASE ORAL at 06:12

## 2023-11-03 RX ADMIN — LISINOPRIL 20 MILLIGRAM(S): 2.5 TABLET ORAL at 06:12

## 2023-11-03 RX ADMIN — CARVEDILOL PHOSPHATE 6.25 MILLIGRAM(S): 80 CAPSULE, EXTENDED RELEASE ORAL at 18:16

## 2023-11-03 RX ADMIN — CHLORHEXIDINE GLUCONATE 1 APPLICATION(S): 213 SOLUTION TOPICAL at 14:38

## 2023-11-03 RX ADMIN — Medication 60 MILLIGRAM(S): at 06:15

## 2023-11-03 NOTE — PROGRESS NOTE ADULT - PROBLEM SELECTOR PLAN 6
- Patient with history of CHF  - Patient on Lisinopril and Carvedilol at home  - Continue home meds with holding parameters Continue home medications.

## 2023-11-03 NOTE — PROGRESS NOTE ADULT - ASSESSMENT
Patient is a 52 year old male from home, ambulates independently at baseline, A&OX3, with PMH of HTN, DM, anemia, ESRD on HD (M/W/F), CHF, cirrhosis, and chronic recurrent ascites (goes to [facility] radiology once ever 3 weeks to get it drained) presented to ED with chest pressure starting today.  Patient was found to have elevated K to 7 and bradycardia down to 33 with junctional bradycardia on EKG.  ICU was consulted for bradycardia and hyperkalemia however patient did not require ICU at this time.  patient was treated with Insulin and dextrose to lower K and his potassium was found to be 5.4 on repeat.  Nephrology was consulted for urgent HD session which was indicated and patient had Urgent HD session overnight without fluid removal as per Nephrologist recommendations.  Patient will likely require repeat HD session for fluid removal.  Patient is being admitted to tele for hyperkalemia, bradycardia, and elevated trops to 449.1. 53M from home, ambulates independently at baseline, A&OX3, with PMH of HTN, DM, anemia, ESRD on HD (M/W/F) minimal urine, CHF (EF 33% 10/23, follows at [facility] for cardiology), cirrhosis, and chronic recurrent ascites (goes to [facility] radiology once ever 3 weeks to get it drained) presenting with sudden shortness of breath. HR 97, WBC 14K, cultures collected, CXR shows increasing hazy density right base; pitting edema, admitted for AHRF in the setting of CHF exacerbation, will admit for dialysis and rule out sepsis.

## 2023-11-03 NOTE — CONSULT NOTE ADULT - TIME BILLING
- Review of records, telemetry, vital signs and daily labs.   - General and cardiovascular physical examination.  - Generation of cardiovascular treatment plan.  - Coordination of care.      Patient was seen and examined by me on 11/3/23 ,interim events noted,labs and radiology studies reviewed.  Solo Quick MD,FACC.  [address] [place], [place] [id number].  [phone]
- Review of chart (documentation, labs/studies, VS trends)  - Personal review of chest imaging  - Medication reconciliation  - Bedside interview and physical examination  - Bedside SpO2 monitoring  - Coordination of care with primary team

## 2023-11-03 NOTE — CHART NOTE - NSCHARTNOTEFT_GEN_A_CORE
Patient refusing CT scan of chest. Patient states he wants to sleep. Primary team to follow. Patient refusing CT scan of chest. Patient states he wants to sleep. 1AM Patient agreeable to do CT scan now. RN and I reached out to CT multiple times but no answer. Primary team to follow.

## 2023-11-03 NOTE — CONSULT NOTE ADULT - SUBJECTIVE AND OBJECTIVE BOX
PULMONARY SERVICE INITIAL CONSULT NOTE    HPI:  53M from home, ambulates independently at baseline, A&OX3, with PMH of HTN, DM, anemia, ESRD on HD (M/W/F) minimal urine, CHF (EF 33% 10/23, follows at [facility] for cardiology), cirrhosis, and chronic recurrent ascites (goes to [facility] radiology once ever 3 weeks to get it drained) presenting with sudden shortness of breath at 1AM 11/2. Patient states that he was eating pizza, cherry jello at the time, then suddenly felt like he was drowning. He noted to cough up bright red substance as well. He has been compliant with dialysis where he usually leaves fluid negative. Patient denies fevers, chills, abdominal pain, nausea, or vomiting. No diarrhea. No acute weakness.  In the ED, HR 97, WBC 14K, cultures collected, CXR shows There is an increasing hazy density right base suggesting infiltration has effusion and slight increase left base effusion as well. RVP negative. Nephrology Dr. Griffiths was consulted; patient to receive dialysis  (02 Nov 2023 12:54)    Pulmonary hx: 53 year old male light smoker, hx of HTN, DM, ESRD (MWF), CHF (EF 33%), cirrhosis, recurrent ascites presented with sudden SOB. Patient states he was eating, then went to sleep and woke up with diffuse abdominal pain and vomited multiple times. States his emesis was red in color due to the jello he had consumed. He developed acute onset SOB and felt lightheaded.  Then this morning he noticed he coughed up phlegm with a dime sized amount of blood. States he only coughs occasionally and has no associated fever, chill, chest pain, SOB, recent travel or any hx of lung disease. Does endorse 50lbs unintentional weight loss over the past two years. No family hx of cancers. Smokes habitually with 1-3 cigarettes week over 20 years.        REVIEW OF SYSTEMS:    CONSTITUTIONAL: No weakness, fevers or chills  EYES/ENT: No visual changes;  No vertigo or throat pain   NECK: No pain or stiffness  RESPIRATORY: No cough, wheezing, + hemoptysis; No shortness of breath  CARDIOVASCULAR: No chest pain or palpitations  GASTROINTESTINAL: No abdominal or epigastric pain. No nausea, vomiting, or hematemesis; No diarrhea or constipation. No melena or hematochezia.  GENITOURINARY: No dysuria, frequency or hematuria  NEUROLOGICAL: No numbness or weakness  SKIN: No itching, burning, rashes, or lesions   All other review of systems is negative unless indicated above.    PAST MEDICAL & SURGICAL HISTORY:  Type 2 diabetes mellitus      Hypertension      End stage renal disease  started HD 2/2019 T, Th, Sat via right chest permacath      Bone spur  right shoulder- hx of - sx done      Anemia      Injury of right wrist  hx of at age 14      History of hyperkalemia  before HD- K-6.2 - to repeat in am of sx, pt had HD today      S/P arthroscopy of right shoulder  2010      History of vascular access device  right chest permacath - 2/2019      H/O right wrist surgery  tendons repair - at age 14      AV fistula          FAMILY HISTORY:  FH: hypertension  mother, father- alive    FH: type 2 diabetes  mother, father- alive        SOCIAL HISTORY:  Smoking Status: [ ] Current, [ ] Former, [ ] Never  Pack Years:    MEDICATIONS:  Pulmonary:    Antimicrobials:    Anticoagulants:  heparin   Injectable 5000 Unit(s) SubCutaneous every 12 hours    Onc:    GI/:  aluminum hydroxide/magnesium hydroxide/simethicone Suspension 30 milliLiter(s) Oral every 4 hours PRN    Endocrine:  atorvastatin 40 milliGRAM(s) Oral at bedtime  insulin lispro (ADMELOG) corrective regimen sliding scale   SubCutaneous three times a day before meals  insulin lispro (ADMELOG) corrective regimen sliding scale   SubCutaneous at bedtime    Cardiac:  carvedilol 6.25 milliGRAM(s) Oral every 12 hours  lisinopril 20 milliGRAM(s) Oral daily  NIFEdipine XL 60 milliGRAM(s) Oral daily    Other Medications:  acetaminophen     Tablet .. 650 milliGRAM(s) Oral every 6 hours PRN  chlorhexidine 2% Cloths 1 Application(s) Topical daily  melatonin 3 milliGRAM(s) Oral at bedtime PRN  ondansetron Injectable 4 milliGRAM(s) IV Push every 8 hours PRN      Allergies    No Known Allergies    Intolerances        Vital Signs Last 24 Hrs  T(C): 36.8 (03 Nov 2023 17:34), Max: 36.9 (03 Nov 2023 05:23)  T(F): 98.2 (03 Nov 2023 17:34), Max: 98.5 (03 Nov 2023 05:23)  HR: 74 (03 Nov 2023 17:34) (67 - 76)  BP: 120/95 (03 Nov 2023 17:34) (120/95 - 162/80)  BP(mean): --  RR: 17 (03 Nov 2023 17:34) (16 - 18)  SpO2: 95% (03 Nov 2023 17:34) (92% - 98%)    Parameters below as of 03 Nov 2023 17:34  Patient On (Oxygen Delivery Method): room air        11-03 @ 07:01  -  11-03 @ 17:53  --------------------------------------------------------  IN: 600 mL / OUT: 3100 mL / NET: -2500 mL          PHYSICAL EXAM:  Constitutional: well-appearing  Head: NC/AT  EENT: PERRL, anicteric sclera; oropharynx clear, MMM  Neck: supple, no appreciable JVD  Respiratory: mild LLL crackles   Cardiovascular: +S1/S2, RRR  Gastrointestinal: soft, NT/ND  Extremities: WWP; no edema, clubbing or cyanosis  Neurological: awake and alert; ANAYA    LABS:      CBC Full  -  ( 03 Nov 2023 09:00 )  WBC Count : 11.44 K/uL  RBC Count : 3.00 M/uL  Hemoglobin : 9.6 g/dL  Hematocrit : 29.5 %  Platelet Count - Automated : 158 K/uL  Mean Cell Volume : 98.3 fl  Mean Cell Hemoglobin : 32.0 pg  Mean Cell Hemoglobin Concentration : 32.5 gm/dL  Auto Neutrophil # : 8.75 K/uL  Auto Lymphocyte # : 0.79 K/uL  Auto Monocyte # : 0.87 K/uL  Auto Eosinophil # : 0.94 K/uL  Auto Basophil # : 0.05 K/uL  Auto Neutrophil % : 76.6 %  Auto Lymphocyte % : 6.9 %  Auto Monocyte % : 7.6 %  Auto Eosinophil % : 8.2 %  Auto Basophil % : 0.4 %    11-03    140  |  102  |  63<H>  ----------------------------<  110<H>  4.9   |  32<H>  |  7.34<H>    Ca    8.4      03 Nov 2023 09:00  Phos  3.6     11-03  Mg     2.2     11-03    TPro  8.0  /  Alb  3.3<L>  /  TBili  0.5  /  DBili  x   /  AST  21  /  ALT  18  /  AlkPhos  99  11-02    PT/INR - ( 02 Nov 2023 09:30 )   PT: 12.8 sec;   INR: 1.13 ratio         PTT - ( 02 Nov 2023 09:30 )  PTT:34.7 sec      Urinalysis Basic - ( 03 Nov 2023 09:00 )    Color: x / Appearance: x / SG: x / pH: x  Gluc: 110 mg/dL / Ketone: x  / Bili: x / Urobili: x   Blood: x / Protein: x / Nitrite: x   Leuk Esterase: x / RBC: x / WBC x   Sq Epi: x / Non Sq Epi: x / Bacteria: x                RADIOLOGY & ADDITIONAL STUDIES:

## 2023-11-03 NOTE — PROGRESS NOTE ADULT - PROBLEM SELECTOR PLAN 2
- Patient with lightheadness and chest pressure at home  - Patient asymptomatic on exam  - Patient HR in 60s on exam  - Patient with bradycardia to 30s in ED  - Patients EKG showing Junctional bradycardia, HR 33,   - Likely 2/2 hyperkalemia As above.

## 2023-11-03 NOTE — PROGRESS NOTE ADULT - PROBLEM SELECTOR PLAN 5
- Patient with recurrent episodes of ascites requiring drainage every 3 weeks  - Patients last drainage on 9/22 with 4.5L removed  - Patient with mild ascites on exam  - Patient with history of Cirrhosis hx of ESRD on M/W/F  Neprhology Dr. Griffiths consulted  Appreciate recs  HD on 11/02, 11/03  HD schedule as per nephro recs  pt is euvolemic at this time  Social Work Consult.

## 2023-11-03 NOTE — CONSULT NOTE ADULT - ASSESSMENT
53M from home, ambulates independently at baseline, A&OX3, with PMH of HTN, DM, anemia, ESRD on HD (M/W/F) minimal urine, CHF (EF 33% 10/23, follows at [facility] for cardiology), cirrhosis, and chronic recurrent ascites (goes to [facility] radiology once ever 3 weeks to get it drained) presenting with sudden shortness of breath. HR 97, WBC 14K, cultures collected, CXR shows increasing hazy density right base; pitting edema, admitted for AHRF in the setting of CHF exacerbation, will admit for dialysis and rule out sepsis.      #  Acute hypoxic respiratory failure.   - Echo in 10/23 revealed LVEF 33%  - 2/2 fluid overload   - HD per Nephro  - Nephro f/u     # HF     #  Elevated troponin.   EKG shows NSR with T wave abnormalities  Tele Monitoring  f/u Echo

## 2023-11-03 NOTE — PROGRESS NOTE ADULT - SUBJECTIVE AND OBJECTIVE BOX
PGY-1 Progress Note discussed with attending    PAGER #: [[phone]] TILL 5:00 PM  PLEASE CONTACT ON CALL TEAM:  - On Call Team (Please refer to Alva) FROM 5:00 PM - 8:30PM  - Nightfloat Team FROM 8:30 -7:30 AM    CHIEF COMPLAINT & BRIEF HOSPITAL COURSE:    INTERVAL HPI/OVERNIGHT EVENTS:       REVIEW OF SYSTEMS:  CONSTITUTIONAL: No fever, weight loss, or fatigue  RESPIRATORY: No cough, wheezing, chills or hemoptysis; No shortness of breath  CARDIOVASCULAR: No chest pain, palpitations, dizziness, or leg swelling  GASTROINTESTINAL: No abdominal pain. No nausea, vomiting, or hematemesis; No diarrhea or constipation. No melena or hematochezia.  GENITOURINARY: No dysuria or hematuria, urinary frequency  NEUROLOGICAL: No headaches, memory loss, loss of strength, numbness, or tremors  SKIN: No itching, burning, rashes, or lesions     Vital Signs Last 24 Hrs  T(C): 36.8 (03 Nov 2023 09:02), Max: 36.9 (03 Nov 2023 05:23)  T(F): 98.2 (03 Nov 2023 09:02), Max: 98.5 (03 Nov 2023 05:23)  HR: 76 (03 Nov 2023 09:02) (73 - 82)  BP: 138/72 (03 Nov 2023 09:02) (121/69 - 162/80)  BP(mean): --  RR: 17 (03 Nov 2023 09:02) (16 - 19)  SpO2: 96% (03 Nov 2023 09:02) (94% - 100%)    Parameters below as of 03 Nov 2023 09:02  Patient On (Oxygen Delivery Method): nasal cannula  O2 Flow (L/min): 2      PHYSICAL EXAMINATION:  GENERAL: NAD, well built  HEAD:  Atraumatic, Normocephalic  EYES:  conjunctiva and sclera clear  NECK: Supple, No JVD, Normal thyroid  CHEST/LUNG: Clear to auscultation. Clear to percussion bilaterally; No rales, rhonchi, wheezing, or rubs  HEART: Regular rate and rhythm; No murmurs, rubs, or gallops  ABDOMEN: Soft, Nontender, Nondistended; Bowel sounds present  NERVOUS SYSTEM:  Alert & Oriented X3,    EXTREMITIES:  2+ Peripheral Pulses, No clubbing, cyanosis, or edema  SKIN: warm dry                          9.6    11.44 )-----------( 158      ( 03 Nov 2023 09:00 )             29.5     11-03    140  |  102  |  63<H>  ----------------------------<  110<H>  4.9   |  32<H>  |  7.34<H>    Ca    8.4      03 Nov 2023 09:00  Phos  3.6     11-03  Mg     2.2     11-03    TPro  8.0  /  Alb  3.3<L>  /  TBili  0.5  /  DBili  x   /  AST  21  /  ALT  18  /  AlkPhos  99  11-02    LIVER FUNCTIONS - ( 02 Nov 2023 09:30 )  Alb: 3.3 g/dL / Pro: 8.0 g/dL / ALK PHOS: 99 U/L / ALT: 18 U/L DA / AST: 21 U/L / GGT: x           CARDIAC MARKERS ( 02 Nov 2023 09:30 )  x     / x     / x     / x     / 5.5 ng/mL      PT/INR - ( 02 Nov 2023 09:30 )   PT: 12.8 sec;   INR: 1.13 ratio         PTT - ( 02 Nov 2023 09:30 )  PTT:34.7 sec    CAPILLARY BLOOD GLUCOSE      RADIOLOGY & ADDITIONAL TESTS:                   PGY-1 Progress Note discussed with attending    PAGER #: [[phone]] TILL 5:00 PM  PLEASE CONTACT ON CALL TEAM:  - On Call Team (Please refer to Alva) FROM 5:00 PM - 8:30PM  - Nightfloat Team FROM 8:30 -7:30 AM    INTERVAL HPI/OVERNIGHT EVENTS:   no acute overnight events, pt. seen and examined at bedside. Pt is stating that he is able to breath better today. However, he is still complaining of bloody coughs that is bright red. He denies any SOB, CP, N,V,D,Abd pain.       REVIEW OF SYSTEMS:  CONSTITUTIONAL: No fever, weight loss, or fatigue  RESPIRATORY: No cough, wheezing, chills + hemoptysis; No shortness of breath  CARDIOVASCULAR: No chest pain, palpitations, dizziness, or leg swelling  GASTROINTESTINAL: No abdominal pain. No nausea, vomiting, or hematemesis; No diarrhea or constipation. No melena or hematochezia.  GENITOURINARY: No dysuria or hematuria, urinary frequency  NEUROLOGICAL: No headaches, memory loss, loss of strength, numbness, or tremors  SKIN: No itching, burning, rashes, or lesions     Vital Signs Last 24 Hrs  T(C): 36.8 (03 Nov 2023 09:02), Max: 36.9 (03 Nov 2023 05:23)  T(F): 98.2 (03 Nov 2023 09:02), Max: 98.5 (03 Nov 2023 05:23)  HR: 76 (03 Nov 2023 09:02) (73 - 82)  BP: 138/72 (03 Nov 2023 09:02) (121/69 - 162/80)  BP(mean): --  RR: 17 (03 Nov 2023 09:02) (16 - 19)  SpO2: 96% (03 Nov 2023 09:02) (94% - 100%)    Parameters below as of 03 Nov 2023 09:02  Patient On (Oxygen Delivery Method): nasal cannula  O2 Flow (L/min): 2      PHYSICAL EXAMINATION:  GENERAL: NAD, well built  HEAD:  Atraumatic, Normocephalic  EYES:  conjunctiva and sclera clear  NECK: Supple, No JVD, Normal thyroid  CHEST/LUNG: Clear to auscultation. Clear to percussion bilaterally; No rales, rhonchi, wheezing, or rubs  HEART: Regular rate and rhythm; No murmurs, rubs, or gallops  ABDOMEN: Soft, Nontender, Nondistended; Bowel sounds present  NERVOUS SYSTEM:  Alert & Oriented X3,    EXTREMITIES:  2+ Peripheral Pulses, No clubbing, cyanosis, or edema, + AV fistula in the RUE with palpable thrush  SKIN: warm dry                          9.6    11.44 )-----------( 158      ( 03 Nov 2023 09:00 )             29.5     11-03    140  |  102  |  63<H>  ----------------------------<  110<H>  4.9   |  32<H>  |  7.34<H>    Ca    8.4      03 Nov 2023 09:00  Phos  3.6     11-03  Mg     2.2     11-03    TPro  8.0  /  Alb  3.3<L>  /  TBili  0.5  /  DBili  x   /  AST  21  /  ALT  18  /  AlkPhos  99  11-02    LIVER FUNCTIONS - ( 02 Nov 2023 09:30 )  Alb: 3.3 g/dL / Pro: 8.0 g/dL / ALK PHOS: 99 U/L / ALT: 18 U/L DA / AST: 21 U/L / GGT: x           CARDIAC MARKERS ( 02 Nov 2023 09:30 )  x     / x     / x     / x     / 5.5 ng/mL      PT/INR - ( 02 Nov 2023 09:30 )   PT: 12.8 sec;   INR: 1.13 ratio         PTT - ( 02 Nov 2023 09:30 )  PTT:34.7 sec    CAPILLARY BLOOD GLUCOSE      RADIOLOGY & ADDITIONAL TESTS:

## 2023-11-03 NOTE — CONSULT NOTE ADULT - ATTENDING COMMENTS
· Continue Coreg 6 25 mg oral b i d   With meals  · Continue Imdur 30 mg oral daily  · Continue nifedipine 30 mg oral daily 53M active light smoker with PMH ESRD on HD, HFrEF, recurrent ascites, p/w acute dyspnea and abdominal discomfort with new onset low volume hemoptysis for which pulmonary was consulted. Cough with hemoptysis started after bout of emesis; R sided airspace opacity seen on CXR. Possible aspiration pneumonitis in setting of uremia and fluid overload state contributing to respiratory sx.      Recommendations:  - Please provide collection cup at bedside to quantify/qualify hemoptysis; pt says decreasing amounts and now dark rather than bright red  - CT chest non con to evaluate for occult airway dz, better evaluate parenchyma, r/o other causes for hemoptysis  - If hemoptysis worsens or persistent would consider cough suppressants +/- desmopressin dose  - Given improvement of respiratory sx, downtrending leukocytosis, can observe off abx for now. Low threshold for initiation of abx for aspiration pna vs CAP  - Monitor strict I/Os, daily weights; fluid removal as per primary team/nephrology as hemodynamics allow  - Smoking cessation counseling provided at bedside; pt motivated to quit without NRT/adjunct therapies  - Aspiration precautions  - Will continue to follow       Mena Ervin MD  Pulmonary & Critical Care  Available on Teams

## 2023-11-03 NOTE — PROGRESS NOTE ADULT - SUBJECTIVE AND OBJECTIVE BOX
NEPHROLOGY MEDICAL CARE, [facility] - Dr. Domenic Griffiths/ Dr. Bishnu Amador/ Dr. Fili Smiley/ Dr. Naomie Crowder    Date of Service: 11-03-23    Patient was seen and examined at bedside.    CC: patient is okay and NAD  breathing is better.    Vital Signs Last 24 Hrs  T(C): 36.4 (03 Nov 2023 12:05), Max: 36.9 (03 Nov 2023 05:23)  T(F): 97.6 (03 Nov 2023 12:05), Max: 98.5 (03 Nov 2023 05:23)  HR: 67 (03 Nov 2023 12:05) (67 - 76)  BP: 153/72 (03 Nov 2023 12:05) (121/69 - 162/80)  BP(mean): --  RR: 16 (03 Nov 2023 12:05) (16 - 18)  SpO2: 96% (03 Nov 2023 12:05) (94% - 98%)    Parameters below as of 03 Nov 2023 12:05  Patient On (Oxygen Delivery Method): nasal cannula  O2 Flow (L/min): 2      11-03 @ 07:01  -  11-03 @ 13:48  --------------------------------------------------------  IN: 600 mL / OUT: 3100 mL / NET: -2500 mL        PHYSICAL EXAM:  General: No acute respiratory distress.  Eyes: conjunctiva and sclera clear  ENMT: Atraumatic, Normocephalic  Respiratory:   Bilaterally poor lungs; No rales, rhonchi, wheezing  Cardiovascular: S1S2+; no m/r/g  Gastrointestinal: Soft, Non-tender, distended; Bowel sounds present  Neuro:  Awake, Alert & Oriented X3, No focal deficits present.   Ext: 2+ pedal edema, No Cyanosis  Skin: No rashes  Dialysis Access:  Left Upper Arm AVF thrill/bruit+     MEDICATIONS:  MEDICATIONS  (STANDING):  atorvastatin 40 milliGRAM(s) Oral at bedtime  carvedilol 6.25 milliGRAM(s) Oral every 12 hours  chlorhexidine 2% Cloths 1 Application(s) Topical daily  heparin   Injectable 5000 Unit(s) SubCutaneous every 12 hours  lisinopril 20 milliGRAM(s) Oral daily  NIFEdipine XL 60 milliGRAM(s) Oral daily    MEDICATIONS  (PRN):  acetaminophen     Tablet .. 650 milliGRAM(s) Oral every 6 hours PRN Temp greater or equal to 38C (100.4F), Mild Pain (1 - 3)  aluminum hydroxide/magnesium hydroxide/simethicone Suspension 30 milliLiter(s) Oral every 4 hours PRN Dyspepsia  melatonin 3 milliGRAM(s) Oral at bedtime PRN Insomnia  ondansetron Injectable 4 milliGRAM(s) IV Push every 8 hours PRN Nausea and/or Vomiting          LABS:                        9.6    11.44 )-----------( 158      ( 03 Nov 2023 09:00 )             29.5     11-03    140  |  102  |  63<H>  ----------------------------<  110<H>  4.9   |  32<H>  |  7.34<H>    Ca    8.4      03 Nov 2023 09:00  Phos  3.6     11-03  Mg     2.2     11-03    TPro  8.0  /  Alb  3.3<L>  /  TBili  0.5  /  DBili  x   /  AST  21  /  ALT  18  /  AlkPhos  99  11-02    PT/INR - ( 02 Nov 2023 09:30 )   PT: 12.8 sec;   INR: 1.13 ratio         PTT - ( 02 Nov 2023 09:30 )  PTT:34.7 sec  Urinalysis Basic - ( 03 Nov 2023 09:00 )    Color: x / Appearance: x / SG: x / pH: x  Gluc: 110 mg/dL / Ketone: x  / Bili: x / Urobili: x   Blood: x / Protein: x / Nitrite: x   Leuk Esterase: x / RBC: x / WBC x   Sq Epi: x / Non Sq Epi: x / Bacteria: x      Magnesium: 2.2 mg/dL (11-03 @ 09:00)  Phosphorus: 3.6 mg/dL (11-03 @ 09:00)    Urine studies    PTH and Vit D:

## 2023-11-03 NOTE — PROGRESS NOTE ADULT - PROBLEM SELECTOR PLAN 7
- Patient with recurrent episodes of ascites requiring drainage every 3 weeks  - Patients last drainage on 9/22 with 4.5L removed  - Patient with mild ascites on exam  - Patient with history of Cirrhosis  - Patient LFTs elevated On oral DM med at home but doesn't state he takes  ISS.

## 2023-11-03 NOTE — PROGRESS NOTE ADULT - ATTENDING COMMENTS
Patient seen and examined this afternoon after return from HD with Dr. Griffiths Nphrologist at bedside    53yM with extensive past medical history of type 2 DM, anemia of chronic disease, HFrEF (33%), ESRD on HD (MWF), who presented with acute onset shortness of breath and an episode of vomiting. Admitted for flash pulmonary edema.  Patient also complaining of hemoptysis  Patient seen at bedside on 11/02/23. Stated he follows with Dr. Griffiths for dialysis, and has never missed a session; Dialyzed Wednesday full session. Upon arrival of EMS, patient was told he was saturating int he high 80s, and supplemental oxygen was not helping. He was put on BiPAP on the ED, and noted improvement at that time. ROS otherwise negative.     Patient s/p HD doing much better does complain of blood streaks in sputum. appeared to be fluid overloaded on admission but seems improved post HD and 2.5 liter fluid removal in HD. Ambulate independently; reports gets ascitic fluid tapped every 3 weeks outpt Radiology and remove about 4 liters. smoker 1 pk/week. also c/o inability to sleep at night and takes Trazadone around midnight but does not sleep until early morning. reports also his wife reporting snoring at night    Patient noted to have mildly elevated troponin on admission likely demand ischemia but rise in troponin this morning with labs with HD noted to have rise in troponin to 4 times admission levels. denies fever, chills, SOB, palpitations, chest pain, nausea, vomiting, diarrhea, constipation, dizziness    Vital Signs Last 24 Hrs  T(C): 37.2 (03 Nov 2023 22:36), Max: 37.2 (03 Nov 2023 22:36)  T(F): 99 (03 Nov 2023 22:36), Max: 99 (03 Nov 2023 22:36)  HR: 75 (03 Nov 2023 22:36) (67 - 76)  BP: 136/71 (03 Nov 2023 22:36) (120/95 - 162/80)  RR: 18 (03 Nov 2023 22:36) (16 - 18)  SpO2: 91% (03 Nov 2023 22:36) (91% - 96%): room air    PE: as above;  Psych: AAO x3  Neuro: No gross focal deficits; Power and sensation intact  CVS: S1S2 present, regular, no edema; crackles at bases  Resp: BLAE+, No wheeze or Rhonchi  GI: Soft, BS+, Non tender, non distended  Extr: No  calf tenderness B/L Lower extremities  Skin: Warm and moist without any rashes    Labs:                        9.6    11.44 )-----------( 158      ( 03 Nov 2023 09:00 )             29.5   11-03    140  |  102  |  63<H>  ----------------------------<  110<H>  4.9   |  32<H>  |  7.34<H>  Ca    8.4      03 Nov 2023 09:00  Phos  3.6     11-03  Mg     2.2     11-03  TPro  8.0  /  Alb  3.3<L>  /  TBili  0.5  /  DBili  x   /  AST  21  /  ALT  18  /  AlkPhos  99  11-02    Xray Chest 1 View- PORTABLE-Urgent (11.02.23 @ 10:35)   IMPRESSION: There are some new small bibasilar lung findings as above. Heart enlargement again seen.    A/P:  #AHRF 2/2 flash pulmonary edema sec to fluid overload  Elevated tropnin sec. to demand ischemia vs NSTEMI  #Hemoptysis suspected Aspiration Pneumonitis  #ESRD on HD (MWF)  #Type 2 DM   #Anemia of chronic disease  #HFrEF (35%) with acute exacerbation  #Liver cirrhosis ?? Cardiac cirrhosis with   #Recurrent Ascites requiring frequent paracentesis     Plan:   Nephrology consult appreciated; d/w Aye; will try to squeeze in for extra session of HD in AM if HD unit scheduling allows   Daily weight, monitor intake/output;   Acute elevation in troponin; EKG; Transfer to Tele; Cardiology consult Dr Quick; 2D Echo AM  Patient off supplemental oxygen on admission now tapered off; saturating well on RA  Sepsis criteria met based on elevated WBC, HR >90, and possible infiltrate on CXR, however, low suspicion for infection, likely d/t pulm edema  agree with holding antibiotics at this time  Pulmonary consult requested; appreciate imput; d/w Dr. Ervin; will get CT Chest non contrast  Accuchek ACHS , ISS  Chronic ascites, patient follows OP for paracentesis [id number]  Continue Losartan, Coreg, Lovostatin at home to Lipitor in house   -SWer f/u for reinstate outpt HD for Monday ; d/w Atilio Dunn  Follow up Blood Cx  DVT ppx  Rest as per PGY2 note above; d/w Dr. Lim and RN

## 2023-11-03 NOTE — PROGRESS NOTE ADULT - ASSESSMENT
1. ESRD on HD (M/W/F)  -s/p UF for 2.0kg yesterday and HD today with UF 2.5kg. plan for extra UF tomorrow if available.   -Hemodialysis Renal Diet and Fluid restriction to 1L/day  -Adjust meds to eGFR and avoid IV Gadolinium contrast,NSAIDs, and phosphate enema.  -Monitor Electrolytes daily.  2. HTN:   -bp is acceptable at present.  -continue bp meds  -titrate bp meds to keep sbp >110 and < 130  3. Anemia of ESRD:  -F/u CBC daily  -transfuse if HB < 7.0.  4. Mineral Bone Disease:  -continue phoslo tid.   -monitor phos  5. Sob possible fluid overload with uncontrolled HTN  -improving  -consult pulmon   -bipap as needed.    Discussed with patient in detail regarding the renal plan and care.  d/w Primary Team

## 2023-11-03 NOTE — PROGRESS NOTE ADULT - PROBLEM SELECTOR PLAN 3
- Patient with elevated trops to 449  - Patient with chest pressure and lightheadedness at home  - symptoms resolved by time of exam in ED  - Trend trops till downtrend  - EKG without acute signs of ischemia  - Likely 2/2 to ESRD requiring urgent HD vs Cardiac (less likely)  - Consider Echo if chest pain returns or trops continue to rise  - Admit to tele p/w elevated troponin  EKG shows NSR with T wave abnormalities  Trop 690 > 2870   f/u T3  Tele Monitoring  f/u Echo  likely demand ischemia in the setting of ESRD  Cardio consulted: Dr. Balderrama p/w elevated troponin  EKG shows NSR with T wave abnormalities  Trop 690 > 2870   f/u T3  Tele Monitoring  f/u Echo  likely demand ischemia in the setting of ESRD  Cardio consulted: Dr. Quick

## 2023-11-03 NOTE — PROGRESS NOTE ADULT - PROBLEM SELECTOR PLAN 4
- Patient with history of DM  - Patient on Glipizide 10mg at home  - Hold Home antidiabetics  - Start Insulin sliding scale  - Finger sticks ACHS  - Diabetic diet pt complains of hemoptysis one week ago that has reoccurred on this admission  pt with streaks of blood in the cup  bright red in color  Pulm consulted Dr. Ervin  F/u CT chest non con  please monitor for bleeding  stop dvt ppx if bleeding increases or continues

## 2023-11-03 NOTE — PROGRESS NOTE ADULT - PROBLEM SELECTOR PLAN 1
- Patient with elevated K on arrival to 7.4  - Patient with Chest pressure   - Patient bradycardic in ED  - Patient with EKG showing junctional bradycardia  - Patient received insulin and dextrose in ED for Hyperkalemia  - Repeat K found to be 5.4  - ICU consulted for hyperkalemia, no ICU indication at this time  - Patient last HD Session was on Friday 9/30  - Patient denies missing HD session  - Patient had Urgent HD session overnight per nephrology recommendations  - F/U Post HD labs  - Admit to tele   - Monitor CMP closely  - Nephrology Consulted Dr. Weems Pt appears clinically hypervolemic, likely cause of SoB  - Echo in 10/23 revealed LVEF 33%  - Makes minimal urine, thus will require HD for fluid removal  - CXR as above  Plan:  - Dialysis on 11/02, 11/03  - Nephro consulted Dr. Griffiths  - Strict I&O  - Daily weights  - Fluid restriction  - Cardiac/DASH Diet: Low salt, low cholesterol diet  - Cardiology consult. Dr. Balderrama Pt appears clinically hypervolemic, likely cause of SoB  - Echo in 10/23 revealed LVEF 33%  - Makes minimal urine, thus will require HD for fluid removal  - CXR as above  Plan:  - Dialysis on 11/02, 11/03  - Nephro consulted Dr. Griffiths  - Strict I&O  - Daily weights  - Fluid restriction  - Cardiac/DASH Diet: Low salt, low cholesterol diet  - Cardiology consult. Dr. Quick

## 2023-11-03 NOTE — CONSULT NOTE ADULT - SUBJECTIVE AND OBJECTIVE BOX
PATIENT SEEN AND EXAMINED BY JEAN PAUL GABRIEL M.D. ON :-  DATE OF SERVICE:             Interim events noted,Labs ,Radiological studies and Cardiology tests reviewed .  DISCUSSED WITH ACP/MEDICAL RESIDENT ON PLAN OF CARE.    MR#[id number]  PATIENT NAME:-[facility] COURSE: HPI:  53M from home, ambulates independently at baseline, A&OX3, with PMH of HTN, DM, anemia, ESRD on HD (M/W/F) minimal urine, CHF (EF 33% 10/23, follows at [facility] for cardiology), cirrhosis, and chronic recurrent ascites (goes to [facility] radiology once ever 3 weeks to get it drained) presenting with sudden shortness of breath at 1AM 11/2. Patient states that he was eating pizza, cherry jello at the time, then suddenly felt like he was drowning. He noted to cough up bright red substance as well. He has been compliant with dialysis where he usually leaves fluid negative.     Patient denies fevers, chills, abdominal pain, nausea, or vomiting. No diarrhea. No acute weakness.    In the ED, HR 97, WBC 14K, cultures collected, CXR shows There is an increasing hazy density right base suggesting infiltration has effusion and slight increase left base effusion as well. RVP negative. Nephrology Dr. Griffiths was consulted; patient to receive dialysis       (02 Nov 2023 12:54)      INTERIM EVENTS:Patient seen at bedside ,interim events noted.      PMH -reviewed admission note, no change since admission  HEART FAILURE: Acute[ ]Chronic[ ] Systolic[ ] Diastolic[ ] Combined Systolic and Diastolic[ ]  CAD[ ] CABG[ ] PCI[ ]  DEVICES[ ] PPM[ ] ICD[ ] ILR[ ]  ATRIAL FIBRILLATION[ ] Paroxysmal[ ] Permanent[ ] CHADS2-[  ]  GHASSAN[ ] CKD1[ ] CKD2[ ] CKD3[ ] CKD4[ ] ESRD[ ]  COPD[ ] HTN[ ]   DM[ ] Type1[ ] Type 2[ ]   CVA[ ] Paresis[ ]    AMBULATION: Assisted[ ] Cane/walker[ ] Independent[ ]    MEDICATIONS  (STANDING):  atorvastatin 40 milliGRAM(s) Oral at bedtime  calcium acetate 667 milliGRAM(s) Oral three times a day with meals  carvedilol 6.25 milliGRAM(s) Oral every 12 hours  chlorhexidine 2% Cloths 1 Application(s) Topical daily  heparin   Injectable 5000 Unit(s) SubCutaneous every 12 hours  insulin lispro (ADMELOG) corrective regimen sliding scale   SubCutaneous three times a day before meals  insulin lispro (ADMELOG) corrective regimen sliding scale   SubCutaneous at bedtime  lisinopril 20 milliGRAM(s) Oral daily  NIFEdipine XL 60 milliGRAM(s) Oral daily    MEDICATIONS  (PRN):  acetaminophen     Tablet .. 650 milliGRAM(s) Oral every 6 hours PRN Temp greater or equal to 38C (100.4F), Mild Pain (1 - 3)  aluminum hydroxide/magnesium hydroxide/simethicone Suspension 30 milliLiter(s) Oral every 4 hours PRN Dyspepsia  melatonin 3 milliGRAM(s) Oral at bedtime PRN Insomnia  ondansetron Injectable 4 milliGRAM(s) IV Push every 8 hours PRN Nausea and/or Vomiting            REVIEW OF SYSTEMS:  Constitutional: [ ] fever, [ ]weight loss,  [ ]fatigue [ ]weight gain  Eyes: [ ] visual changes  Respiratory: [ ]shortness of breath;  [ ] cough, [ ]wheezing, [ ]chills, [ ]hemoptysis  Cardiovascular: [ ] chest pain, [ ]palpitations, [ ]dizziness,  [ ]leg swelling[ ]orthopnea[ ]PND  Gastrointestinal: [ ] abdominal pain, [ ]nausea, [ ]vomiting,  [ ]diarrhea [ ]Constipation [ ]Melena  Genitourinary: [ ] dysuria, [ ] hematuria [ ]Vigil  Neurologic: [ ] headaches [ ] tremors[ ]weakness [ ]Paralysis Right[ ] Left[ ]  Skin: [ ] itching, [ ]burning, [ ] rashes  Endocrine: [ ] heat or cold intolerance  Musculoskeletal: [ ] joint pain or swelling; [ ] muscle, back, or extremity pain  Psychiatric: [ ] depression, [ ]anxiety, [ ]mood swings, or [ ]difficulty sleeping  Hematologic: [ ] easy bruising, [ ] bleeding gums    [ ] All remaining systems negative except as per above.   [ ]Unable to obtain.  [x] No change in ROS since admission      Vital Signs Last 24 Hrs  T(C): 36.8 (03 Nov 2023 17:34), Max: 36.9 (03 Nov 2023 05:23)  T(F): 98.2 (03 Nov 2023 17:34), Max: 98.5 (03 Nov 2023 05:23)  HR: 74 (03 Nov 2023 17:34) (67 - 76)  BP: 120/95 (03 Nov 2023 17:34) (120/95 - 162/80)  BP(mean): --  RR: 17 (03 Nov 2023 17:34) (16 - 18)  SpO2: 95% (03 Nov 2023 17:34) (92% - 96%)    Parameters below as of 03 Nov 2023 17:34  Patient On (Oxygen Delivery Method): room air      I&O's Summary    03 Nov 2023 07:01  -  03 Nov 2023 20:18  --------------------------------------------------------  IN: 600 mL / OUT: 3100 mL / NET: -2500 mL        PHYSICAL EXAM:  General: No acute distress BMI-  HEENT: EOMI, PERRL  Neck: Supple, [ ] JVD  Lungs: Equal air entry bilaterally; [ ] rales [ ] wheezing [ ] rhonchi  Heart: Regular rate and rhythm; [x ] murmur   2/6 [ x] systolic [ ] diastolic [ ] radiation[ ] rubs [ ]  gallops  Abdomen: Nontender, bowel sounds present  Extremities: No clubbing, cyanosis, [ ] edema [ ]Pulses  equal and intact  Nervous system:  Alert & Oriented X3, no focal deficits  Psychiatric: Normal affect  Skin: No rashes or lesions    LABS:  11-03    140  |  102  |  63<H>  ----------------------------<  110<H>  4.9   |  32<H>  |  7.34<H>    Ca    8.4      03 Nov 2023 09:00  Phos  3.6     11-03  Mg     2.2     11-03    TPro  8.0  /  Alb  3.3<L>  /  TBili  0.5  /  DBili  x   /  AST  21  /  ALT  18  /  AlkPhos  99  11-02    Creatinine Trend: 7.34<--, 5.81<--                        9.6    11.44 )-----------( 158      ( 03 Nov 2023 09:00 )             29.5     PT/INR - ( 02 Nov 2023 09:30 )   PT: 12.8 sec;   INR: 1.13 ratio         PTT - ( 02 Nov 2023 09:30 )  PTT:34.7 sec

## 2023-11-04 LAB
ANION GAP SERPL CALC-SCNC: 7 MMOL/L — SIGNIFICANT CHANGE UP (ref 5–17)
ANION GAP SERPL CALC-SCNC: 7 MMOL/L — SIGNIFICANT CHANGE UP (ref 5–17)
BASOPHILS # BLD AUTO: 0.04 K/UL — SIGNIFICANT CHANGE UP (ref 0–0.2)
BASOPHILS # BLD AUTO: 0.04 K/UL — SIGNIFICANT CHANGE UP (ref 0–0.2)
BASOPHILS NFR BLD AUTO: 0.5 % — SIGNIFICANT CHANGE UP (ref 0–2)
BASOPHILS NFR BLD AUTO: 0.5 % — SIGNIFICANT CHANGE UP (ref 0–2)
BILIRUB SERPL-MCNC: 0.4 MG/DL — SIGNIFICANT CHANGE UP (ref 0.2–1.2)
BILIRUB SERPL-MCNC: 0.4 MG/DL — SIGNIFICANT CHANGE UP (ref 0.2–1.2)
BUN SERPL-MCNC: 50 MG/DL — HIGH (ref 7–18)
BUN SERPL-MCNC: 50 MG/DL — HIGH (ref 7–18)
CALCIUM SERPL-MCNC: 8.1 MG/DL — LOW (ref 8.4–10.5)
CALCIUM SERPL-MCNC: 8.1 MG/DL — LOW (ref 8.4–10.5)
CHLORIDE SERPL-SCNC: 103 MMOL/L — SIGNIFICANT CHANGE UP (ref 96–108)
CHLORIDE SERPL-SCNC: 103 MMOL/L — SIGNIFICANT CHANGE UP (ref 96–108)
CK MB BLD-MCNC: 3.5 % — SIGNIFICANT CHANGE UP (ref 0–3.5)
CK MB BLD-MCNC: 3.5 % — SIGNIFICANT CHANGE UP (ref 0–3.5)
CK MB CFR SERPL CALC: 2.6 NG/ML — SIGNIFICANT CHANGE UP (ref 0–3.6)
CK MB CFR SERPL CALC: 2.6 NG/ML — SIGNIFICANT CHANGE UP (ref 0–3.6)
CK SERPL-CCNC: 74 U/L — SIGNIFICANT CHANGE UP (ref 35–232)
CK SERPL-CCNC: 74 U/L — SIGNIFICANT CHANGE UP (ref 35–232)
CO2 SERPL-SCNC: 30 MMOL/L — SIGNIFICANT CHANGE UP (ref 22–31)
CO2 SERPL-SCNC: 30 MMOL/L — SIGNIFICANT CHANGE UP (ref 22–31)
CREAT SERPL-MCNC: 5.95 MG/DL — HIGH (ref 0.5–1.3)
CREAT SERPL-MCNC: 5.95 MG/DL — HIGH (ref 0.5–1.3)
EGFR: 11 ML/MIN/1.73M2 — LOW
EGFR: 11 ML/MIN/1.73M2 — LOW
EOSINOPHIL # BLD AUTO: 1.06 K/UL — HIGH (ref 0–0.5)
EOSINOPHIL # BLD AUTO: 1.06 K/UL — HIGH (ref 0–0.5)
EOSINOPHIL NFR BLD AUTO: 13.9 % — HIGH (ref 0–6)
EOSINOPHIL NFR BLD AUTO: 13.9 % — HIGH (ref 0–6)
GLUCOSE BLDC GLUCOMTR-MCNC: 110 MG/DL — HIGH (ref 70–99)
GLUCOSE BLDC GLUCOMTR-MCNC: 110 MG/DL — HIGH (ref 70–99)
GLUCOSE BLDC GLUCOMTR-MCNC: 120 MG/DL — HIGH (ref 70–99)
GLUCOSE BLDC GLUCOMTR-MCNC: 120 MG/DL — HIGH (ref 70–99)
GLUCOSE BLDC GLUCOMTR-MCNC: 140 MG/DL — HIGH (ref 70–99)
GLUCOSE BLDC GLUCOMTR-MCNC: 140 MG/DL — HIGH (ref 70–99)
GLUCOSE BLDC GLUCOMTR-MCNC: 92 MG/DL — SIGNIFICANT CHANGE UP (ref 70–99)
GLUCOSE BLDC GLUCOMTR-MCNC: 92 MG/DL — SIGNIFICANT CHANGE UP (ref 70–99)
GLUCOSE SERPL-MCNC: 127 MG/DL — HIGH (ref 70–99)
GLUCOSE SERPL-MCNC: 127 MG/DL — HIGH (ref 70–99)
HCT VFR BLD CALC: 29.7 % — LOW (ref 39–50)
HCT VFR BLD CALC: 29.7 % — LOW (ref 39–50)
HGB BLD-MCNC: 9.6 G/DL — LOW (ref 13–17)
HGB BLD-MCNC: 9.6 G/DL — LOW (ref 13–17)
IMM GRANULOCYTES NFR BLD AUTO: 0.3 % — SIGNIFICANT CHANGE UP (ref 0–0.9)
IMM GRANULOCYTES NFR BLD AUTO: 0.3 % — SIGNIFICANT CHANGE UP (ref 0–0.9)
INR BLD: 1.24 RATIO — HIGH (ref 0.85–1.18)
INR BLD: 1.24 RATIO — HIGH (ref 0.85–1.18)
LYMPHOCYTES # BLD AUTO: 0.86 K/UL — LOW (ref 1–3.3)
LYMPHOCYTES # BLD AUTO: 0.86 K/UL — LOW (ref 1–3.3)
LYMPHOCYTES # BLD AUTO: 11.3 % — LOW (ref 13–44)
LYMPHOCYTES # BLD AUTO: 11.3 % — LOW (ref 13–44)
MAGNESIUM SERPL-MCNC: 2.1 MG/DL — SIGNIFICANT CHANGE UP (ref 1.6–2.6)
MAGNESIUM SERPL-MCNC: 2.1 MG/DL — SIGNIFICANT CHANGE UP (ref 1.6–2.6)
MCHC RBC-ENTMCNC: 31.1 PG — SIGNIFICANT CHANGE UP (ref 27–34)
MCHC RBC-ENTMCNC: 31.1 PG — SIGNIFICANT CHANGE UP (ref 27–34)
MCHC RBC-ENTMCNC: 32.3 GM/DL — SIGNIFICANT CHANGE UP (ref 32–36)
MCHC RBC-ENTMCNC: 32.3 GM/DL — SIGNIFICANT CHANGE UP (ref 32–36)
MCV RBC AUTO: 96.1 FL — SIGNIFICANT CHANGE UP (ref 80–100)
MCV RBC AUTO: 96.1 FL — SIGNIFICANT CHANGE UP (ref 80–100)
MELD SCORE WITH DIALYSIS: 22 POINTS — SIGNIFICANT CHANGE UP
MELD SCORE WITH DIALYSIS: 22 POINTS — SIGNIFICANT CHANGE UP
MELD SCORE WITHOUT DIALYSIS: 22 POINTS — SIGNIFICANT CHANGE UP
MELD SCORE WITHOUT DIALYSIS: 22 POINTS — SIGNIFICANT CHANGE UP
MONOCYTES # BLD AUTO: 0.84 K/UL — SIGNIFICANT CHANGE UP (ref 0–0.9)
MONOCYTES # BLD AUTO: 0.84 K/UL — SIGNIFICANT CHANGE UP (ref 0–0.9)
MONOCYTES NFR BLD AUTO: 11.1 % — SIGNIFICANT CHANGE UP (ref 2–14)
MONOCYTES NFR BLD AUTO: 11.1 % — SIGNIFICANT CHANGE UP (ref 2–14)
NEUTROPHILS # BLD AUTO: 4.78 K/UL — SIGNIFICANT CHANGE UP (ref 1.8–7.4)
NEUTROPHILS # BLD AUTO: 4.78 K/UL — SIGNIFICANT CHANGE UP (ref 1.8–7.4)
NEUTROPHILS NFR BLD AUTO: 62.9 % — SIGNIFICANT CHANGE UP (ref 43–77)
NEUTROPHILS NFR BLD AUTO: 62.9 % — SIGNIFICANT CHANGE UP (ref 43–77)
NRBC # BLD: 0 /100 WBCS — SIGNIFICANT CHANGE UP (ref 0–0)
NRBC # BLD: 0 /100 WBCS — SIGNIFICANT CHANGE UP (ref 0–0)
PHOSPHATE SERPL-MCNC: 3.4 MG/DL — SIGNIFICANT CHANGE UP (ref 2.5–4.5)
PHOSPHATE SERPL-MCNC: 3.4 MG/DL — SIGNIFICANT CHANGE UP (ref 2.5–4.5)
PLATELET # BLD AUTO: 159 K/UL — SIGNIFICANT CHANGE UP (ref 150–400)
PLATELET # BLD AUTO: 159 K/UL — SIGNIFICANT CHANGE UP (ref 150–400)
POTASSIUM SERPL-MCNC: 4.4 MMOL/L — SIGNIFICANT CHANGE UP (ref 3.5–5.3)
POTASSIUM SERPL-MCNC: 4.4 MMOL/L — SIGNIFICANT CHANGE UP (ref 3.5–5.3)
POTASSIUM SERPL-SCNC: 4.4 MMOL/L — SIGNIFICANT CHANGE UP (ref 3.5–5.3)
POTASSIUM SERPL-SCNC: 4.4 MMOL/L — SIGNIFICANT CHANGE UP (ref 3.5–5.3)
PROTHROM AB SERPL-ACNC: 14.1 SEC — HIGH (ref 9.5–13)
PROTHROM AB SERPL-ACNC: 14.1 SEC — HIGH (ref 9.5–13)
RBC # BLD: 3.09 M/UL — LOW (ref 4.2–5.8)
RBC # BLD: 3.09 M/UL — LOW (ref 4.2–5.8)
RBC # FLD: 13.7 % — SIGNIFICANT CHANGE UP (ref 10.3–14.5)
RBC # FLD: 13.7 % — SIGNIFICANT CHANGE UP (ref 10.3–14.5)
SODIUM SERPL-SCNC: 140 MMOL/L — SIGNIFICANT CHANGE UP (ref 135–145)
SODIUM SERPL-SCNC: 140 MMOL/L — SIGNIFICANT CHANGE UP (ref 135–145)
TROPONIN I, HIGH SENSITIVITY RESULT: 1381.4 NG/L — HIGH
TROPONIN I, HIGH SENSITIVITY RESULT: 1381.4 NG/L — HIGH
WBC # BLD: 7.6 K/UL — SIGNIFICANT CHANGE UP (ref 3.8–10.5)
WBC # BLD: 7.6 K/UL — SIGNIFICANT CHANGE UP (ref 3.8–10.5)
WBC # FLD AUTO: 7.6 K/UL — SIGNIFICANT CHANGE UP (ref 3.8–10.5)
WBC # FLD AUTO: 7.6 K/UL — SIGNIFICANT CHANGE UP (ref 3.8–10.5)

## 2023-11-04 PROCEDURE — 71250 CT THORAX DX C-: CPT | Mod: 26

## 2023-11-04 PROCEDURE — 99233 SBSQ HOSP IP/OBS HIGH 50: CPT

## 2023-11-04 RX ADMIN — Medication 3 MILLIGRAM(S): at 01:44

## 2023-11-04 RX ADMIN — CARVEDILOL PHOSPHATE 6.25 MILLIGRAM(S): 80 CAPSULE, EXTENDED RELEASE ORAL at 17:51

## 2023-11-04 RX ADMIN — Medication 667 MILLIGRAM(S): at 08:19

## 2023-11-04 RX ADMIN — LISINOPRIL 20 MILLIGRAM(S): 2.5 TABLET ORAL at 05:58

## 2023-11-04 RX ADMIN — CARVEDILOL PHOSPHATE 6.25 MILLIGRAM(S): 80 CAPSULE, EXTENDED RELEASE ORAL at 05:58

## 2023-11-04 RX ADMIN — Medication 60 MILLIGRAM(S): at 05:58

## 2023-11-04 RX ADMIN — Medication 667 MILLIGRAM(S): at 12:57

## 2023-11-04 RX ADMIN — CHLORHEXIDINE GLUCONATE 1 APPLICATION(S): 213 SOLUTION TOPICAL at 12:56

## 2023-11-04 RX ADMIN — ATORVASTATIN CALCIUM 40 MILLIGRAM(S): 80 TABLET, FILM COATED ORAL at 22:13

## 2023-11-04 NOTE — PROGRESS NOTE ADULT - ASSESSMENT
1. ESRD on HD (M/W/F)  -s/p HD yesterday. HD on Monday.  -Hemodialysis Renal Diet and Fluid restriction to 1L/day  -Adjust meds to eGFR and avoid IV Gadolinium contrast,NSAIDs, and phosphate enema.  -Monitor Electrolytes daily.  2. HTN:   -BP is acceptable at present.  -continue bp meds  -titrate bp meds to keep sbp >110 and < 130  3. Anemia of ESRD:  -F/u CBC daily  -transfuse if HB < 7.0.  4. Mineral Bone Disease:  -continue phoslo tid.   -monitor phos  5. Sob possible fluid overload with uncontrolled HTN  -improving  -consult pulmon   -bipap as needed.    Discussed with patient in detail regarding the renal plan and care.  d/w Primary Team

## 2023-11-04 NOTE — PROGRESS NOTE ADULT - ASSESSMENT
A/P:  #Elevated tropnin sec. to demand ischemia vs NSTEMI  #AHRF 2/2 flash pulmonary edema sec to fluid overload  #Hemoptysis suspected Aspiration Pneumonitis  #ESRD on HD (MWF)  #Type 2 DM   #Anemia of chronic disease  #HFrEF (35%) with acute exacerbation  #Liver cirrhosis ?? Cardiac cirrhosis with   #Recurrent Ascites requiring frequent paracentesis     Plan:   Nephrology consult and f/u appreciated; d/w Siriki; unable to do extra HD today staffing as well as pt hemodynamically stable  Daily weight, monitor intake/output;   Acute elevation in troponin; EKG; Monitor on Tele; Cardiology consult d/w Dr Quick; Follow up 2DEcho; if no significant abnormality no further intervention inpatient and f/u outpatient  Patient off supplemental oxygen  saturating well on RA  Sepsis criteria met based on elevated WBC, HR >90, and possible infiltrate on CXR, however, low suspicion for infection, likely d/t pulm edema  agree with holding antibiotics at this time  Pulmonary consult appreciated; d/w Dr. Ervin; Await CT Chest non contrast  Accuchek ACHS , ISS  Chronic ascites, patient follows OP for paracentesis [id number]  Continue Losartan, Coreg, Lovostatin at home to Lipitor in house   -SWer f/u for reinstate outpt HD for Monday ; d/w Atilio Dunn  Follow up Blood Cx  DVT ppx  Rest as per PGY2 note above; d/w Dr. Lim and RN .

## 2023-11-04 NOTE — PROGRESS NOTE ADULT - SUBJECTIVE AND OBJECTIVE BOX
PATIENT SEEN AND EXAMINED BY JEAN PAUL GABRIEL M.D. ON :- 11/4/23  DATE OF SERVICE:  11/4/23           Interim events noted,Labs ,Radiological studies and Cardiology tests reviewed .  DISCUSSED WITH ACP/MEDICAL RESIDENT ON PLAN OF CARE.    MR#[id number]  [facility]: HPI:  53M from home, ambulates independently at baseline, A&OX3, with PMH of HTN, DM, anemia, ESRD on HD (M/W/F) minimal urine, CHF (EF 33% 10/23, follows at [facility] for cardiology), cirrhosis, and chronic recurrent ascites (goes to [facility] radiology once ever 3 weeks to get it drained) presenting with sudden shortness of breath at 1AM 11/2. Patient states that he was eating pizza, cherry jello at the time, then suddenly felt like he was drowning. He noted to cough up bright red substance as well. He has been compliant with dialysis where he usually leaves fluid negative.     Patient denies fevers, chills, abdominal pain, nausea, or vomiting. No diarrhea. No acute weakness.    In the ED, HR 97, WBC 14K, cultures collected, CXR shows There is an increasing hazy density right base suggesting infiltration has effusion and slight increase left base effusion as well. RVP negative. Nephrology Dr. Griffiths was consulted; patient to receive dialysis       (02 Nov 2023 12:54)      INTERIM EVENTS:Patient seen at bedside ,interim events noted.      PMH -reviewed admission note, no change since admission  HEART FAILURE: Acute[ ]Chronic[ ] Systolic[ ] Diastolic[ ] Combined Systolic and Diastolic[ ]  CAD[ ] CABG[ ] PCI[ ]  DEVICES[ ] PPM[ ] ICD[ ] ILR[ ]  ATRIAL FIBRILLATION[ ] Paroxysmal[ ] Permanent[ ] CHADS2-[  ]  GHASSAN[ ] CKD1[ ] CKD2[ ] CKD3[ ] CKD4[ ] ESRD[ ]  COPD[ ] HTN[ ]   DM[ ] Type1[ ] Type 2[ ]   CVA[ ] Paresis[ ]    AMBULATION: Assisted[ ] Cane/walker[ ] Independent[ ]    MEDICATIONS  (STANDING):  atorvastatin 40 milliGRAM(s) Oral at bedtime  calcium acetate 667 milliGRAM(s) Oral three times a day with meals  carvedilol 6.25 milliGRAM(s) Oral every 12 hours  chlorhexidine 2% Cloths 1 Application(s) Topical daily  heparin   Injectable 5000 Unit(s) SubCutaneous every 12 hours  insulin lispro (ADMELOG) corrective regimen sliding scale   SubCutaneous three times a day before meals  insulin lispro (ADMELOG) corrective regimen sliding scale   SubCutaneous at bedtime  lisinopril 20 milliGRAM(s) Oral daily  NIFEdipine XL 60 milliGRAM(s) Oral daily    MEDICATIONS  (PRN):  acetaminophen     Tablet .. 650 milliGRAM(s) Oral every 6 hours PRN Temp greater or equal to 38C (100.4F), Mild Pain (1 - 3)  aluminum hydroxide/magnesium hydroxide/simethicone Suspension 30 milliLiter(s) Oral every 4 hours PRN Dyspepsia  melatonin 3 milliGRAM(s) Oral at bedtime PRN Insomnia  ondansetron Injectable 4 milliGRAM(s) IV Push every 8 hours PRN Nausea and/or Vomiting            REVIEW OF SYSTEMS:  Constitutional: [ ] fever, [ ]weight loss,  [ ]fatigue [ ]weight gain  Eyes: [ ] visual changes  Respiratory: [ ]shortness of breath;  [ ] cough, [ ]wheezing, [ ]chills, [ ]hemoptysis  Cardiovascular: [ ] chest pain, [ ]palpitations, [ ]dizziness,  [ ]leg swelling[ ]orthopnea[ ]PND  Gastrointestinal: [ ] abdominal pain, [ ]nausea, [ ]vomiting,  [ ]diarrhea [ ]Constipation [ ]Melena  Genitourinary: [ ] dysuria, [ ] hematuria [ ]Vigil  Neurologic: [ ] headaches [ ] tremors[ ]weakness [ ]Paralysis Right[ ] Left[ ]  Skin: [ ] itching, [ ]burning, [ ] rashes  Endocrine: [ ] heat or cold intolerance  Musculoskeletal: [ ] joint pain or swelling; [ ] muscle, back, or extremity pain  Psychiatric: [ ] depression, [ ]anxiety, [ ]mood swings, or [ ]difficulty sleeping  Hematologic: [ ] easy bruising, [ ] bleeding gums    [ ] All remaining systems negative except as per above.   [ ]Unable to obtain.  [x] No change in ROS since admission      Vital Signs Last 24 Hrs  T(C): 36.9 (04 Nov 2023 15:37), Max: 37.2 (03 Nov 2023 22:36)  T(F): 98.4 (04 Nov 2023 15:37), Max: 99 (03 Nov 2023 22:36)  HR: 68 (04 Nov 2023 15:37) (65 - 75)  BP: 136/69 (04 Nov 2023 15:37) (132/64 - 141/76)  BP(mean): --  RR: 19 (04 Nov 2023 15:37) (18 - 20)  SpO2: 97% (04 Nov 2023 15:37) (91% - 100%)    Parameters below as of 04 Nov 2023 15:37  Patient On (Oxygen Delivery Method): room air      I&O's Summary    03 Nov 2023 07:01  -  04 Nov 2023 07:00  --------------------------------------------------------  IN: 600 mL / OUT: 3100 mL / NET: -2500 mL        PHYSICAL EXAM:  General: No acute distress BMI-  HEENT: EOMI, PERRL  Neck: Supple, [ ] JVD  Lungs: Equal air entry bilaterally; [ ] rales [ ] wheezing [ ] rhonchi  Heart: Regular rate and rhythm; [x ] murmur   2/6 [ x] systolic [ ] diastolic [ ] radiation[ ] rubs [ ]  gallops  Abdomen: Nontender, bowel sounds present  Extremities: No clubbing, cyanosis, [ ] edema [ ]Pulses  equal and intact  Nervous system:  Alert & Oriented X3, no focal deficits  Psychiatric: Normal affect  Skin: No rashes or lesions    LABS:  11-04    140  |  103  |  50<H>  ----------------------------<  127<H>  4.4   |  30  |  5.95<H>    Ca    8.1<L>      04 Nov 2023 05:22  Phos  3.4     11-04  Mg     2.1     11-04    TPro  x   /  Alb  x   /  TBili  0.4  /  DBili  x   /  AST  x   /  ALT  x   /  AlkPhos  x   11-04    Creatinine Trend: 5.95<--, 7.34<--, 5.81<--                        9.6    7.60  )-----------( 159      ( 04 Nov 2023 05:22 )             29.7     PT/INR - ( 04 Nov 2023 05:22 )   PT: 14.1 sec;   INR: 1.24 ratio

## 2023-11-04 NOTE — SBIRT NOTE ADULT - NSSBIRTDRGPOSREINDET_GEN_A_CORE
Atilio commended pt on his non illicit drug use and discussed the harms and life threatening results illicit drug use has on one's overall health.

## 2023-11-04 NOTE — PROGRESS NOTE ADULT - SUBJECTIVE AND OBJECTIVE BOX
Patient seen and examined this afternoon after return from HD with Dr. Griffiths Nphrologist at bedside    53yM with extensive past medical history of type 2 DM, anemia of chronic disease, HFrEF (33%), ESRD on HD (MWF), who presented with acute onset shortness of breath and an episode of vomiting. Admitted for flash pulmonary edema.  Patient also complaining of hemoptysis  Patient seen at bedside on 11/02/23. Stated he follows with Dr. Griffiths for dialysis, and has never missed a session; Dialyzed Wednesday full session. Upon arrival of EMS, patient was told he was saturating int he high 80s, and supplemental oxygen was not helping. He was put on BiPAP on the ED, and noted improvement at that time. ROS otherwise negative.     Patient s/p HD doing much better does complain of blood streaks in sputum. appeared to be fluid overloaded on admission but seems improved post HD and 2.5 liter fluid removal in HD. Ambulate independently; reports gets ascitic fluid tapped every 3 weeks outpt Radiology and remove about 4 liters. smoker 1 pk/week. also c/o inability to sleep at night and takes Trazadone around midnight but does not sleep until early morning. reports also his wife reporting snoring at night    Patient noted to have mildly elevated troponin on admission likely demand ischemia but rise in troponin this morning with labs with HD noted to have rise in troponin to 4 times admission levels. denies fever, chills, SOB, palpitations, chest pain, nausea, vomiting, diarrhea, constipation, dizziness    Vital Signs Last 24 Hrs  T(C): 37.2 (03 Nov 2023 22:36), Max: 37.2 (03 Nov 2023 22:36)  T(F): 99 (03 Nov 2023 22:36), Max: 99 (03 Nov 2023 22:36)  HR: 75 (03 Nov 2023 22:36) (67 - 76)  BP: 136/71 (03 Nov 2023 22:36) (120/95 - 162/80)  RR: 18 (03 Nov 2023 22:36) (16 - 18)  SpO2: 91% (03 Nov 2023 22:36) (91% - 96%): room air    PE: as above;  Psych: AAO x3  Neuro: No gross focal deficits; Power and sensation intact  CVS: S1S2 present, regular, no edema; crackles at bases  Resp: BLAE+, No wheeze or Rhonchi  GI: Soft, BS+, Non tender, non distended  Extr: No  calf tenderness B/L Lower extremities  Skin: Warm and moist without any rashes    Labs:                        9.6    7.60  )-----------( 159      ( 04 Nov 2023 05:22 )             29.7     11-04  140  |  103  |  50<H>  ----------------------------<  127<H>  4.4   |  30  |  5.95<H>  Ca    8.1<L>      04 Nov 2023 05:22  Phos  3.4     11-04  Mg     2.1     11-04  TPro  x   /  Alb  x   /  TBili  0.4  /  DBili  x   /  AST  x   /  ALT  x   /  AlkPhos  x   11-04      Xray Chest 1 View- PORTABLE-Urgent (11.02.23 @ 10:35)   IMPRESSION: There are some new small bibasilar lung findings as above. Heart enlargement again seen.

## 2023-11-04 NOTE — SBIRT NOTE ADULT - NSSBIRTALCPOSREINDET_GEN_A_CORE
Atilio discussed susbtance use resources and educated pt on the harms of alcohol use has on his overall health.

## 2023-11-04 NOTE — PROGRESS NOTE ADULT - ASSESSMENT
53M from home, ambulates independently at baseline, A&OX3, with PMH of HTN, DM, anemia, ESRD on HD (M/W/F) minimal urine, CHF (EF 33% 10/23, follows at [facility] for cardiology), cirrhosis, and chronic recurrent ascites (goes to [facility] radiology once ever 3 weeks to get it drained) presenting with sudden shortness of breath. HR 97, WBC 14K, cultures collected, CXR shows increasing hazy density right base; pitting edema, admitted for AHRF in the setting of CHF exacerbation, will admit for dialysis and rule out sepsis.      #  Acute hypoxic respiratory failure.   - Echo in 10/23 revealed LVEF 33%  - 2/2 fluid overload   - HD per Nephro  - Nephro f/u     # HF     #  Elevated troponin.   EKG shows NSR with T wave abnormalities  Tele Monitoring  f/u Echo

## 2023-11-04 NOTE — PROGRESS NOTE ADULT - SUBJECTIVE AND OBJECTIVE BOX
ALAINA CANO  53y  Patient is a 53y old  Male who presents with a chief complaint of CHF Exacerbation (2023 17:18)    HPI:  ESRD on HD, s/p HD yesterday.    HEALTH ISSUES - PROBLEM Dx:  Acute hypoxic respiratory failure    CHF exacerbation    ESRD on dialysis    HTN (hypertension)    DM (diabetes mellitus)    Elevated troponin    Hemoptysis    Prophylactic measure          MEDICATIONS  (STANDING):  atorvastatin 40 milliGRAM(s) Oral at bedtime  calcium acetate 667 milliGRAM(s) Oral three times a day with meals  carvedilol 6.25 milliGRAM(s) Oral every 12 hours  chlorhexidine 2% Cloths 1 Application(s) Topical daily  heparin   Injectable 5000 Unit(s) SubCutaneous every 12 hours  insulin lispro (ADMELOG) corrective regimen sliding scale   SubCutaneous three times a day before meals  insulin lispro (ADMELOG) corrective regimen sliding scale   SubCutaneous at bedtime  lisinopril 20 milliGRAM(s) Oral daily  NIFEdipine XL 60 milliGRAM(s) Oral daily    MEDICATIONS  (PRN):  acetaminophen     Tablet .. 650 milliGRAM(s) Oral every 6 hours PRN Temp greater or equal to 38C (100.4F), Mild Pain (1 - 3)  aluminum hydroxide/magnesium hydroxide/simethicone Suspension 30 milliLiter(s) Oral every 4 hours PRN Dyspepsia  melatonin 3 milliGRAM(s) Oral at bedtime PRN Insomnia  ondansetron Injectable 4 milliGRAM(s) IV Push every 8 hours PRN Nausea and/or Vomiting    Vital Signs Last 24 Hrs  T(C): 36.4 (2023 19:45), Max: 37.2 (2023 22:36)  T(F): 97.5 (2023 19:45), Max: 99 (2023 22:36)  HR: 70 (2023 19:45) (65 - 75)  BP: 140/73 (2023 19:45) (132/64 - 141/76)  BP(mean): --  RR: 19 (2023 19:45) (18 - 20)  SpO2: 100% (2023 19:45) (91% - 100%)    Parameters below as of 2023 19:45  Patient On (Oxygen Delivery Method): room air      Daily     Daily Weight in k (2023 23:53)  PHYSICAL EXAM:  Constitutional: He appears comfortable and not distressed. Not diaphoretic.    Neck:  The thyroid is normal. Trachea is midline.     Breasts: Normal examination.    Respiratory: The lungs are clear to auscultation. No dullness and expansion is normal.    Cardiovascular: S1 and S2 are normal. No mummurs, rubs or gallops are present.    Gastrointestinal: The abdomen is soft. No tenderness is present. No masses are present. Bowel sounds are normal.    Genitourinary: The bladder is not distended. No CVA tenderness is present.    Extremities: No edema is noted. No deformities are present.    Neurological: Cognition is normal. Tone, power and sensation are normal. Gait is steady.    Skin: No leasions are seen  or palpated.    Lymph Nodes: No lymphadenopathy is present.    Psychiatric: Mood is appropriate. No hallucinations or flight of ideas are noted.                              9.6    7.60  )-----------( 159      ( 2023 05:22 )             29.7     11-04    140  |  103  |  50<H>  ----------------------------<  127<H>  4.4   |  30  |  5.95<H>    Ca    8.1<L>      2023 05:22  Phos  3.4     11-04  Mg     2.1     11-04    TPro  x   /  Alb  x   /  TBili  0.4  /  DBili  x   /  AST  x   /  ALT  x   /  AlkPhos  x   11-04    Urinalysis Basic - ( 2023 05:22 )    Color: x / Appearance: x / SG: x / pH: x  Gluc: 127 mg/dL / Ketone: x  / Bili: x / Urobili: x   Blood: x / Protein: x / Nitrite: x   Leuk Esterase: x / RBC: x / WBC x   Sq Epi: x / Non Sq Epi: x / Bacteria: x

## 2023-11-05 LAB
CRP SERPL-MCNC: 61 MG/L — HIGH
CRP SERPL-MCNC: 61 MG/L — HIGH
ERYTHROCYTE [SEDIMENTATION RATE] IN BLOOD: 46 MM/HR — HIGH (ref 0–20)
ERYTHROCYTE [SEDIMENTATION RATE] IN BLOOD: 46 MM/HR — HIGH (ref 0–20)
GLUCOSE BLDC GLUCOMTR-MCNC: 113 MG/DL — HIGH (ref 70–99)
GLUCOSE BLDC GLUCOMTR-MCNC: 113 MG/DL — HIGH (ref 70–99)
GLUCOSE BLDC GLUCOMTR-MCNC: 137 MG/DL — HIGH (ref 70–99)
GLUCOSE BLDC GLUCOMTR-MCNC: 137 MG/DL — HIGH (ref 70–99)
GLUCOSE BLDC GLUCOMTR-MCNC: 168 MG/DL — HIGH (ref 70–99)
GLUCOSE BLDC GLUCOMTR-MCNC: 168 MG/DL — HIGH (ref 70–99)
GLUCOSE BLDC GLUCOMTR-MCNC: 69 MG/DL — LOW (ref 70–99)
GLUCOSE BLDC GLUCOMTR-MCNC: 69 MG/DL — LOW (ref 70–99)

## 2023-11-05 PROCEDURE — 99233 SBSQ HOSP IP/OBS HIGH 50: CPT | Mod: GC

## 2023-11-05 RX ORDER — TRAZODONE HCL 50 MG
50 TABLET ORAL DAILY
Refills: 0 | Status: DISCONTINUED | OUTPATIENT
Start: 2023-11-05 | End: 2023-11-06

## 2023-11-05 RX ADMIN — Medication 667 MILLIGRAM(S): at 17:06

## 2023-11-05 RX ADMIN — Medication 50 MILLIGRAM(S): at 21:18

## 2023-11-05 RX ADMIN — ATORVASTATIN CALCIUM 40 MILLIGRAM(S): 80 TABLET, FILM COATED ORAL at 21:19

## 2023-11-05 RX ADMIN — CARVEDILOL PHOSPHATE 6.25 MILLIGRAM(S): 80 CAPSULE, EXTENDED RELEASE ORAL at 17:06

## 2023-11-05 RX ADMIN — Medication 667 MILLIGRAM(S): at 08:07

## 2023-11-05 RX ADMIN — CARVEDILOL PHOSPHATE 6.25 MILLIGRAM(S): 80 CAPSULE, EXTENDED RELEASE ORAL at 06:06

## 2023-11-05 RX ADMIN — LISINOPRIL 20 MILLIGRAM(S): 2.5 TABLET ORAL at 06:06

## 2023-11-05 RX ADMIN — Medication 60 MILLIGRAM(S): at 06:06

## 2023-11-05 RX ADMIN — Medication 667 MILLIGRAM(S): at 11:55

## 2023-11-05 RX ADMIN — CHLORHEXIDINE GLUCONATE 1 APPLICATION(S): 213 SOLUTION TOPICAL at 11:55

## 2023-11-05 NOTE — PROGRESS NOTE ADULT - ATTENDING COMMENTS
Patient seen and examined this morning    53yM with extensive past medical history of type 2 DM, anemia of chronic disease, HFrEF (35%), ESRD on HD (MWF), who presented with acute onset shortness of breath and an episode of vomiting. Admitted for flash pulmonary edema.  Patient also complaining of hemoptysis; Patient follows with Dr. Griffiths for dialysis, and hcompliant with HD; Upon arrival of EMS, patient was told he was saturating int he high 80s, and supplemental oxygen was not helping. He was put on BiPAP on the ED, and noted improvement at that time. ROS otherwise negative.     Patient s/p HD doing much better does complain of blood streaks in sputum. appeared to be fluid overloaded on admission but seems improved post HD and remaining stable. Ambulate independently; reports gets ascitic fluid tapped every 3 weeks outpt Radiology and remove about 4 liters. smoker 1 pk/week. also c/o inability to sleep at night and takes Trazadone around midnight but does not sleep until early morning. reports also his wife reporting snoring at night    Patient noted to have mildly elevated troponin on admission likely demand ischemia but rise in troponin this morning with labs with HD noted to have rise in troponin to 4 times admission levels. denies fever, chills, SOB, palpitations, chest pain, nausea, vomiting, diarrhea, constipation, dizziness Patient seen and examined this morning    53yM with extensive past medical history of type 2 DM, anemia of chronic disease, HFrEF (35%), ESRD on HD (MWF), who presented with acute onset shortness of breath and an episode of vomiting. Admitted for flash pulmonary edema.  Patient also complaining of hemoptysis; Patient follows with Dr. Griffiths for dialysis, and hcompliant with HD; Upon arrival of EMS, patient was told he was saturating int he high 80s, and supplemental oxygen was not helping. He was put on BiPAP on the ED, and noted improvement at that time. ROS otherwise negative.     Patient s/p HD doing much better does complain of blood streaks in sputum. appeared to be fluid overloaded on admission but seems improved post HD and remaining stable. Ambulate independently; reports gets ascitic fluid tapped every 3 weeks outpt Radiology and remove about 4 liters. smoker 1 pk/week. also c/o inability to sleep at night and takes Trazadone around midnight but does not sleep until early morning. reports also his wife reporting snoring at night; Patient noted to have mildly elevated troponin on admission likely demand ischemia but rise in troponin  4 times admission transferred to Tele    Patient doing well; Ambulating well; not in fluid overload; noted to have Eosinophils to 12 to 15% going back to last admission. still has some mild streaks of blood but improved as per patient; Review of prior Echo a month ago with EF35% and Grade III DD; Patient reports having a Coronary Angio at [facility] within the year not needing any stent (non ischemic ?). No new complaints today    Vital Signs Last 24 Hrs: reviewed as above; saturation 97 to 100% RA    P/E:  Psych: AAO x3  Neuro: No gross focal deficits; Power and sensation intact  CVS: S1S2 present, regular, no edema  Resp: BLAE+, No wheeze or Rhonchi  GI: Soft, BS+, Non tender, non distended  Extr: No  calf tenderness B/L Lower extremities  Skin: Warm and moist without any rashes    Labs:                        9.6    7.60  )-----------( 159      ( 04 Nov 2023 05:22 )             29.7     11-04  140  |  103  |  50<H>  ----------------------------<  127<H>  4.4   |  30  |  5.95<H>  Ca    8.1<L>      04 Nov 2023 05:22  Phos  3.4     11-04  Mg     2.1     11-04  TPro  x   /  Alb  x   /  TBili  0.4  /  DBili  x   /  AST  x   /  ALT  x   /  AlkPhos  x   11-04    CT Chest No Cont (11.04.23 @ 15:24)   IMPRESSION: Patchy opacities are noted in the right middle and left lower lobes. Etiology is unclear.    A/P:  #Hemoptysis with Eosinophilia and underlying CKD/ESRD concern for Vasculitis/ Autoimmune disease process  #Elevated troponin sec. to demand ischemia vs NSTEMI  #AHRF 2/2 flash pulmonary edema sec to fluid overload resolved  #Hemoptysis suspected Aspiration Pneumonitis  #ESRD on HD (MWF)  #Type 2 DM   #Anemia of chronic disease due toCKD/ESRD  Chronic combined systolic and Diastolic HF ??infiltrative disease  #Liver cirrhosis ?? Cardiac cirrhosis with Recurrent Ascites requiring frequent paracentesis     Plan:   Given the eosinophilia will hold discharge today send an autoimmune work up for vasculitis especially given hemoptysis and patchy opacities on CT  ESR/ CRP/ JONAS/ C3/C4/ ANCA/ Cryoglobulins/ Strongyloid Ab; Hold discharge for today; Will D/C after HD tomorrow; d/w Nephro Dr. Griffiths  No fever or leucocytosis in the setting of patchy infiltrates and hence will hold off Antibiotics; d/w Pulmonary Dr. Ervin; agrees  Discussed with Cardio Dr. Quick; reviewed Echo findings and prior Angio; will consider infiltrative cardiac disease like Amyloidosis  Follow up a repeat 2DEcho; if no significant abnormality no further intervention inpatient and f/u outpatient  Patient off supplemental oxygen  saturating well on RA  Sepsis criteria met based on elevated WBC, HR >90, and possible infiltrate on CXR, however, low suspicion for infection, likely d/t pulm edema  agree with holding antibiotics at this time  Accuchek ACHS , ISS  Chronic ascites, patient follows OP for paracentesis [id number]  Continue Losartan, Coreg, Lovastatin at home to Lipitor in house   SWer follow up AM   DVT ppx    Discussed with PGy1 Dr. Quevedo and RN

## 2023-11-05 NOTE — PROGRESS NOTE ADULT - SUBJECTIVE AND OBJECTIVE BOX
PATIENT SEEN AND EXAMINED BY SOLO GABRIEL M.D. ON :- 11/5/23  DATE OF SERVICE:   11/5/23          Interim events noted,Labs ,Radiological studies and Cardiology tests reviewed .  [facility] ON PLAN OF CARE.    MR#[id number]  PATIENT NAME:RAAD CANO    DATE OF SERVICE: 11-05-23 @ 21:57  Patient was seen and examined by Solo Gabriel MD on    11-05-23 @ 21:57 .  Interim events noted.Consultant notes ,Labs,Telemetry reviewed by me       HOSPITAL COURSE: HPI:  53M from home, ambulates independently at baseline, A&OX3, with PMH of HTN, DM, anemia, ESRD on HD (M/W/F) minimal urine, CHF (EF 33% 10/23, follows at [facility] for cardiology), cirrhosis, and chronic recurrent ascites (goes to [facility] radiology once ever 3 weeks to get it drained) presenting with sudden shortness of breath at 1AM 11/2. Patient states that he was eating pizza, cherry jello at the time, then suddenly felt like he was drowning. He noted to cough up bright red substance as well. He has been compliant with dialysis where he usually leaves fluid negative.     Patient denies fevers, chills, abdominal pain, nausea, or vomiting. No diarrhea. No acute weakness.    In the ED, HR 97, WBC 14K, cultures collected, CXR shows There is an increasing hazy density right base suggesting infiltration has effusion and slight increase left base effusion as well. RVP negative. Nephrology Dr. Griffiths was consulted; patient to receive dialysis       (02 Nov 2023 12:54)      INTERIM EVENTS:Patient seen at bedside ,interim events noted.      PMH -reviewed admission note, no change since admission  HEART FAILURE: Acute[ ]Chronic[ ] Systolic[ ] Diastolic[ ] Combined Systolic and Diastolic[ ]  CAD[ ] CABG[ ] PCI[ ]  DEVICES[ ] PPM[ ] ICD[ ] ILR[ ]  ATRIAL FIBRILLATION[ ] Paroxysmal[ ] Permanent[ ] CHADS2-[  ]  GHASSAN[ ] CKD1[ ] CKD2[ ] CKD3[ ] CKD4[ ] ESRD[ ]  COPD[ ] HTN[ ]   DM[ ] Type1[ ] Type 2[ ]   CVA[ ] Paresis[ ]    AMBULATION: Assisted[ ] Cane/walker[ ] Independent[ ]    MEDICATIONS  (STANDING):  atorvastatin 40 milliGRAM(s) Oral at bedtime  calcium acetate 667 milliGRAM(s) Oral three times a day with meals  carvedilol 6.25 milliGRAM(s) Oral every 12 hours  chlorhexidine 2% Cloths 1 Application(s) Topical daily  heparin   Injectable 5000 Unit(s) SubCutaneous every 12 hours  insulin lispro (ADMELOG) corrective regimen sliding scale   SubCutaneous three times a day before meals  insulin lispro (ADMELOG) corrective regimen sliding scale   SubCutaneous at bedtime  lisinopril 20 milliGRAM(s) Oral daily  NIFEdipine XL 60 milliGRAM(s) Oral daily  traZODone 50 milliGRAM(s) Oral daily    MEDICATIONS  (PRN):  acetaminophen     Tablet .. 650 milliGRAM(s) Oral every 6 hours PRN Temp greater or equal to 38C (100.4F), Mild Pain (1 - 3)  aluminum hydroxide/magnesium hydroxide/simethicone Suspension 30 milliLiter(s) Oral every 4 hours PRN Dyspepsia  melatonin 3 milliGRAM(s) Oral at bedtime PRN Insomnia  ondansetron Injectable 4 milliGRAM(s) IV Push every 8 hours PRN Nausea and/or Vomiting            REVIEW OF SYSTEMS:  Constitutional: [ ] fever, [ ]weight loss,  [ ]fatigue [ ]weight gain  Eyes: [ ] visual changes  Respiratory: [ ]shortness of breath;  [ ] cough, [ ]wheezing, [ ]chills, [ ]hemoptysis  Cardiovascular: [ ] chest pain, [ ]palpitations, [ ]dizziness,  [ ]leg swelling[ ]orthopnea[ ]PND  Gastrointestinal: [ ] abdominal pain, [ ]nausea, [ ]vomiting,  [ ]diarrhea [ ]Constipation [ ]Melena  Genitourinary: [ ] dysuria, [ ] hematuria [ ]Vigil  Neurologic: [ ] headaches [ ] tremors[ ]weakness [ ]Paralysis Right[ ] Left[ ]  Skin: [ ] itching, [ ]burning, [ ] rashes  Endocrine: [ ] heat or cold intolerance  Musculoskeletal: [ ] joint pain or swelling; [ ] muscle, back, or extremity pain  Psychiatric: [ ] depression, [ ]anxiety, [ ]mood swings, or [ ]difficulty sleeping  Hematologic: [ ] easy bruising, [ ] bleeding gums    [ ] All remaining systems negative except as per above.   [ ]Unable to obtain.  [x] No change in ROS since admission      Vital Signs Last 24 Hrs  T(C): 36.7 (05 Nov 2023 19:57), Max: 36.9 (05 Nov 2023 04:55)  T(F): 98.1 (05 Nov 2023 19:57), Max: 98.4 (05 Nov 2023 04:55)  HR: 69 (05 Nov 2023 19:57) (61 - 69)  BP: 145/77 (05 Nov 2023 19:57) (139/76 - 163/73)  BP(mean): --  RR: 18 (05 Nov 2023 19:57) (18 - 20)  SpO2: 100% (05 Nov 2023 19:57) (97% - 100%)    Parameters below as of 05 Nov 2023 19:57  Patient On (Oxygen Delivery Method): room air      I&O's Summary    04 Nov 2023 08:01  -  05 Nov 2023 07:00  --------------------------------------------------------  IN: 300 mL / OUT: 0 mL / NET: 300 mL        PHYSICAL EXAM:  General: No acute distress BMI-  HEENT: EOMI, PERRL  Neck: Supple, [ ] JVD  Lungs: Equal air entry bilaterally; [ ] rales [ ] wheezing [ ] rhonchi  Heart: Regular rate and rhythm; [x ] murmur   2/6 [ x] systolic [ ] diastolic [ ] radiation[ ] rubs [ ]  gallops  Abdomen: Nontender, bowel sounds present  Extremities: No clubbing, cyanosis, [ ] edema [ ]Pulses  equal and intact  Nervous system:  Alert & Oriented X3, no focal deficits  Psychiatric: Normal affect  Skin: No rashes or lesions    LABS:  11-04    140  |  103  |  50<H>  ----------------------------<  127<H>  4.4   |  30  |  5.95<H>    Ca    8.1<L>      04 Nov 2023 05:22  Phos  3.4     11-04  Mg     2.1     11-04    TPro  x   /  Alb  x   /  TBili  0.4  /  DBili  x   /  AST  x   /  ALT  x   /  AlkPhos  x   11-04    Creatinine Trend: 5.95<--, 7.34<--, 5.81<--                        9.6    7.60  )-----------( 159      ( 04 Nov 2023 05:22 )             29.7     PT/INR - ( 04 Nov 2023 05:22 )   PT: 14.1 sec;   INR: 1.24 ratio           CONCLUSIONS:  1. Trace mitral regurgitation.  2.  Trace aortic regurgitation.  3. Moderately dilated left atrium.  LA volume index = 46  cc/m2.  4. Severe left ventricular enlargement.  5. Moderate global left ventricular systolic dysfunction.  6. Grade III-IV diastolic dysfunction (severe).  7. Right ventricle not well visualized.    Probably normal  right ventricular size and systolic function.  8. Trace pericardial effusion.    ------------------------------------------------------------------------  Confirmed on  11/5/2023 - 15:50:34 by Deep Benoit MD  ------------------------------------------------------------------------

## 2023-11-05 NOTE — PROGRESS NOTE ADULT - ASSESSMENT
53M from home, ambulates independently at baseline, A&OX3, with PMH of HTN, DM, anemia, ESRD on HD (M/W/F) minimal urine, CHF (EF 33% 10/23, follows at [facility] for cardiology), cirrhosis, and chronic recurrent ascites (goes to [facility] radiology once ever 3 weeks to get it drained) presenting with sudden shortness of breath. HR 97, WBC 14K, cultures collected, CXR shows increasing hazy density right base; pitting edema, admitted for AHRF in the setting of CHF exacerbation, will admit for dialysis and rule out sepsis.      #  Acute hypoxic respiratory failure.   - Echo in 10/23 revealed LVEF 33%  - 2/2 fluid overload   - HD per Nephro  - Nephro f/u     # HF     #  Elevated troponin.   EKG shows NSR with T wave abnormalities  Tele Monitoring   Echo Severe left ventricular enlargement.  5. Moderate global left ventricular systolic dysfunction.  6. Grade III-IV diastolic dysfunction (severe).

## 2023-11-05 NOTE — PROGRESS NOTE ADULT - SUBJECTIVE AND OBJECTIVE BOX
PGY-1 Progress Note discussed with attending    PAGER #: [[phone]] TILL 5:00 PM  PLEASE CONTACT ON CALL TEAM:  - On Call Team (Please refer to Alva) FROM 5:00 PM - 8:30PM  - Nightfloat Team FROM 8:30 -7:30 AM    CHIEF COMPLAINT & BRIEF HOSPITAL COURSE:    INTERVAL HPI/OVERNIGHT EVENTS:       REVIEW OF SYSTEMS:  CONSTITUTIONAL: No fever, weight loss, or fatigue  RESPIRATORY: No cough, wheezing, chills or hemoptysis; No shortness of breath  CARDIOVASCULAR: No chest pain, palpitations, dizziness, or leg swelling  GASTROINTESTINAL: No abdominal pain. No nausea, vomiting, or hematemesis; No diarrhea or constipation. No melena or hematochezia.  GENITOURINARY: No dysuria or hematuria, urinary frequency  NEUROLOGICAL: No headaches, memory loss, loss of strength, numbness, or tremors  SKIN: No itching, burning, rashes, or lesions     Vital Signs Last 24 Hrs  T(C): 36.6 (05 Nov 2023 07:22), Max: 36.9 (04 Nov 2023 11:15)  T(F): 97.9 (05 Nov 2023 07:22), Max: 98.4 (04 Nov 2023 11:15)  HR: 63 (05 Nov 2023 07:22) (63 - 70)  BP: 163/73 (05 Nov 2023 07:22) (136/69 - 163/73)  BP(mean): --  RR: 18 (05 Nov 2023 07:22) (18 - 20)  SpO2: 97% (05 Nov 2023 07:22) (97% - 100%)    Parameters below as of 05 Nov 2023 07:22  Patient On (Oxygen Delivery Method): room air        PHYSICAL EXAMINATION:  GENERAL: NAD, well built  HEAD:  Atraumatic, Normocephalic  EYES:  conjunctiva and sclera clear  NECK: Supple, No JVD, Normal thyroid  CHEST/LUNG: Clear to auscultation. Clear to percussion bilaterally; No rales, rhonchi, wheezing, or rubs  HEART: Regular rate and rhythm; No murmurs, rubs, or gallops  ABDOMEN: Soft, Nontender, Nondistended; Bowel sounds present  NERVOUS SYSTEM:  Alert & Oriented X3,    EXTREMITIES:  2+ Peripheral Pulses, No clubbing, cyanosis, or edema  SKIN: warm dry                          9.6    7.60  )-----------( 159      ( 04 Nov 2023 05:22 )             29.7     11-04    140  |  103  |  50<H>  ----------------------------<  127<H>  4.4   |  30  |  5.95<H>    Ca    8.1<L>      04 Nov 2023 05:22  Phos  3.4     11-04  Mg     2.1     11-04    TPro  x   /  Alb  x   /  TBili  0.4  /  DBili  x   /  AST  x   /  ALT  x   /  AlkPhos  x   11-04      CARDIAC MARKERS ( 04 Nov 2023 05:22 )  x     / x     / 74 U/L / x     / 2.6 ng/mL      PT/INR - ( 04 Nov 2023 05:22 )   PT: 14.1 sec;   INR: 1.24 ratio             CAPILLARY BLOOD GLUCOSE      RADIOLOGY & ADDITIONAL TESTS:                   PGY-1 Progress Note discussed with attending    PAGER #: [[phone]] TILL 5:00 PM  PLEASE CONTACT ON CALL TEAM:  - On Call Team (Please refer to Alva) FROM 5:00 PM - 8:30PM  - Nightfloat Team FROM 8:30 -7:30 AM      INTERVAL HPI/OVERNIGHT EVENTS: Patient examined at bedside, offers no complaints.      REVIEW OF SYSTEMS:  CONSTITUTIONAL: No fever, weight loss, or fatigue  RESPIRATORY: No cough, wheezing, chills or hemoptysis; No shortness of breath  CARDIOVASCULAR: No chest pain, palpitations, dizziness, or leg swelling  GASTROINTESTINAL: No abdominal pain. No nausea, vomiting, or hematemesis; No diarrhea or constipation. No melena or hematochezia.  GENITOURINARY: No dysuria or hematuria, urinary frequency  NEUROLOGICAL: No headaches, memory loss, loss of strength, numbness, or tremors  SKIN: No itching, burning, rashes, or lesions     Vital Signs Last 24 Hrs  T(C): 36.6 (05 Nov 2023 07:22), Max: 36.9 (04 Nov 2023 11:15)  T(F): 97.9 (05 Nov 2023 07:22), Max: 98.4 (04 Nov 2023 11:15)  HR: 63 (05 Nov 2023 07:22) (63 - 70)  BP: 163/73 (05 Nov 2023 07:22) (136/69 - 163/73)  BP(mean): --  RR: 18 (05 Nov 2023 07:22) (18 - 20)  SpO2: 97% (05 Nov 2023 07:22) (97% - 100%)    Parameters below as of 05 Nov 2023 07:22  Patient On (Oxygen Delivery Method): room air        PHYSICAL EXAMINATION:  GENERAL: NAD, well built  HEAD:  Atraumatic, Normocephalic  EYES:  conjunctiva and sclera clear  NECK: Supple, No JVD, Normal thyroid  CHEST/LUNG: Clear to auscultation. No rales, rhonchi, wheezing, or rubs  HEART: Regular rate and rhythm; No murmurs, rubs, or gallops  ABDOMEN: Soft, Nontender, Nondistended; Bowel sounds present  NERVOUS SYSTEM:  Alert & Oriented X3,    EXTREMITIES:  2+ Peripheral Pulses, No clubbing, cyanosis, or edema  SKIN: warm dry                          9.6    7.60  )-----------( 159      ( 04 Nov 2023 05:22 )             29.7     11-04    140  |  103  |  50<H>  ----------------------------<  127<H>  4.4   |  30  |  5.95<H>    Ca    8.1<L>      04 Nov 2023 05:22  Phos  3.4     11-04  Mg     2.1     11-04    TPro  x   /  Alb  x   /  TBili  0.4  /  DBili  x   /  AST  x   /  ALT  x   /  AlkPhos  x   11-04      CARDIAC MARKERS ( 04 Nov 2023 05:22 )  x     / x     / 74 U/L / x     / 2.6 ng/mL      PT/INR - ( 04 Nov 2023 05:22 )   PT: 14.1 sec;   INR: 1.24 ratio             CAPILLARY BLOOD GLUCOSE      RADIOLOGY & ADDITIONAL TESTS:

## 2023-11-05 NOTE — PROGRESS NOTE ADULT - PROBLEM SELECTOR PLAN 4
pt complains of hemoptysis one week ago that has reoccurred on this admission  pt with streaks of blood in the cup  bright red in color  Pulm consulted Dr. Ervin  F/u CT chest non con  please monitor for bleeding  stop dvt ppx if bleeding increases or continues hx of ESRD on M/W/F  Neprhology Dr. Griffiths consulted  Appreciate recs  HD on 11/02, 11/03  HD schedule as per nephro recs  pt is euvolemic at this time  Social Work Consult.

## 2023-11-05 NOTE — PROGRESS NOTE ADULT - ASSESSMENT
53M from home, ambulates independently at baseline, A&OX3, with PMH of HTN, DM, anemia, ESRD on HD (M/W/F) minimal urine, CHF (EF 33% 10/23, follows at [facility] for cardiology), cirrhosis, and chronic recurrent ascites (goes to [facility] radiology once ever 3 weeks to get it drained) presenting with sudden shortness of breath. HR 97, WBC 14K, cultures collected, CXR shows increasing hazy density right base; pitting edema, admitted for AHRF in the setting of CHF exacerbation, will admit for dialysis and rule out sepsis. 53M from home, ambulates independently at baseline, A&OX3, with PMH of HTN, DM, anemia, ESRD on HD (M/W/F) minimal urine, CHF (EF 33% 10/23, follows at [facility] for cardiology), cirrhosis, and chronic recurrent ascites (goes to [facility] radiology once ever 3 weeks to get it drained) presenting with sudden shortness of breath. Also c/o blood streaked sputum. HR 97, WBC 14K, cultures collected, CXR shows increasing hazy density right base; pitting edema, admitted for AHRF in the setting of CHF exacerbation, Admitted for dialysis.

## 2023-11-05 NOTE — PROGRESS NOTE ADULT - PROBLEM SELECTOR PLAN 7
On oral DM med at home but doesn't state he takes  ISS. On oral DM med at home but takes only when he has heavy meals  ISS.

## 2023-11-05 NOTE — PROGRESS NOTE ADULT - ASSESSMENT
1. ESRD on HD (M/W/F)  -s/p HD yesterday. HD on Monday.  -Hemodialysis Renal Diet and Fluid restriction to 1L/day  -Adjust meds to eGFR and avoid IV Gadolinium contrast,NSAIDs, and phosphate enema.  -Monitor Electrolytes daily.    2. HTN:   -BP is acceptable at present.  -continue bp meds  -titrate bp meds to keep sbp >110 and < 130.    3. Anemia of ESRD:  -F/u CBC daily  -transfuse if HB < 7.0.    4. Mineral Bone Disease:  -continue phoslo tid.   -monitor phos.    5. Sob possible fluid overload with uncontrolled HTN  -improving  -consult pulmon   - BiPAP as needed.    Discussed with patient in detail regarding the renal plan and care.

## 2023-11-05 NOTE — PROGRESS NOTE ADULT - PROBLEM SELECTOR PLAN 2
As above. Pt appears clinically hypervolemic, likely cause of SoB  (CKD exacerbation)  - Echo in 10/23 revealed LVEF 33%  - CXR as above  - CT chest: Patchy opacities are noted in the right middle and left lower lobes  - s/p HD on 11/03  - SATing well on RA  - HD tomorrow  - Nephro consulted Dr. Griffiths  - Strict I&O  - Daily weights  - Fluid restriction  - Cardiac/DASH Diet: Low salt, low cholesterol diet  - Cardiology consult. Dr. Quick

## 2023-11-05 NOTE — PROGRESS NOTE ADULT - SUBJECTIVE AND OBJECTIVE BOX
Lam Park MD  Nephrology      Covering for Dr Zak CANO, EDWARD  53y  Patient is a 53y old  Male who presents with a chief complaint of CHF Exacerbation (2023 09:52)    HPI:  ESRD and CHF, admitted with volume overload.  S/P HD on Friday.  No dyspnea now.      HEALTH ISSUES - PROBLEM Dx:  Acute hypoxic respiratory failure    CHF exacerbation    ESRD on dialysis    HTN (hypertension)    DM (diabetes mellitus)    Elevated troponin    Hemoptysis    Prophylactic measure          MEDICATIONS  (STANDING):  atorvastatin 40 milliGRAM(s) Oral at bedtime  calcium acetate 667 milliGRAM(s) Oral three times a day with meals  carvedilol 6.25 milliGRAM(s) Oral every 12 hours  chlorhexidine 2% Cloths 1 Application(s) Topical daily  heparin   Injectable 5000 Unit(s) SubCutaneous every 12 hours  insulin lispro (ADMELOG) corrective regimen sliding scale   SubCutaneous three times a day before meals  insulin lispro (ADMELOG) corrective regimen sliding scale   SubCutaneous at bedtime  lisinopril 20 milliGRAM(s) Oral daily  NIFEdipine XL 60 milliGRAM(s) Oral daily  traZODone 50 milliGRAM(s) Oral daily    MEDICATIONS  (PRN):  acetaminophen     Tablet .. 650 milliGRAM(s) Oral every 6 hours PRN Temp greater or equal to 38C (100.4F), Mild Pain (1 - 3)  aluminum hydroxide/magnesium hydroxide/simethicone Suspension 30 milliLiter(s) Oral every 4 hours PRN Dyspepsia  melatonin 3 milliGRAM(s) Oral at bedtime PRN Insomnia  ondansetron Injectable 4 milliGRAM(s) IV Push every 8 hours PRN Nausea and/or Vomiting    Vital Signs Last 24 Hrs  T(C): 36.7 (2023 11:20), Max: 36.9 (2023 15:37)  T(F): 98.1 (2023 11:20), Max: 98.4 (2023 15:37)  HR: 61 (2023 11:20) (61 - 70)  BP: 142/67 (2023 11:20) (136/69 - 163/73)  BP(mean): --  RR: 18 (2023 11:20) (18 - 20)  SpO2: 97% (2023 11:20) (97% - 100%)    Parameters below as of 2023 11:20  Patient On (Oxygen Delivery Method): room air      Daily     Daily Weight in k.8 (2023 04:55)    PHYSICAL EXAM:  Constitutional:  He appears comfortable and not distressed. Not diaphoretic.    Neck:  The thyroid is normal. Trachea is midline.     Breasts: Normal examination.    Respiratory: The lungs are clear to auscultation. No dullness and expansion is normal.    Cardiovascular: S1 and S2 are normal. No mummurs, rubs or gallops are present.    Gastrointestinal: The abdomen is soft. No tenderness is present. Minimal ascites.    Genitourinary: The bladder is not distended. No CVA tenderness is present.    Extremities: No edema is noted. No deformities are present.    Neurological: Cognition is normal. Tone, power and sensation are normal. Gait is steady.    Skin: No lesions are seen  or palpated.    Lymph Nodes: No lymphadenopathy is present.    Psychiatric: Mood is appropriate. No hallucinations or flight of ideas are noted.                              9.6    7.60  )-----------( 159      ( 2023 05:22 )             29.7     11-04    140  |  103  |  50<H>  ----------------------------<  127<H>  4.4   |  30  |  5.95<H>    Ca    8.1<L>      2023 05:22  Phos  3.4     11-  Mg     2.1     -    TPro  x   /  Alb  x   /  TBili  0.4  /  DBili  x   /  AST  x   /  ALT  x   /  AlkPhos  x   04

## 2023-11-05 NOTE — PROGRESS NOTE ADULT - PROBLEM SELECTOR PLAN 1
Pt appears clinically hypervolemic, likely cause of SoB  - Echo in 10/23 revealed LVEF 33%  - Makes minimal urine, thus will require HD for fluid removal  - CXR as above  Plan:  - Dialysis on 11/02, 11/03  - Nephro consulted Dr. Griffiths  - Strict I&O  - Daily weights  - Fluid restriction  - Cardiac/DASH Diet: Low salt, low cholesterol diet  - Cardiology consult. Dr. Quick pt complains of hemoptysis one week ago that has reoccurred on this admission  pt with streaks of blood in the cup; bright red in color  ? due to Pul HTN vs parasitic infection vs Autoimmune  f/u JONAS, ESR, CRP, ANCA, Cryoglobulin, Strongyloides Ab  Pulm consulted Dr. Ervin  CT chest as above.  please monitor for bleeding

## 2023-11-05 NOTE — PROGRESS NOTE ADULT - PROBLEM SELECTOR PLAN 5
hx of ESRD on M/W/F  Neprhology Dr. Griffiths consulted  Appreciate recs  HD on 11/02, 11/03  HD schedule as per nephro recs  pt is euvolemic at this time  Social Work Consult. - Echo in 10/23 revealed LVEF 33% and Grade III DD, RV systolic pressure 60mm Hg  - f/u repeat ECHO  - not on exacerbation

## 2023-11-05 NOTE — PROGRESS NOTE ADULT - PROBLEM SELECTOR PLAN 3
p/w elevated troponin  EKG shows NSR with T wave abnormalities  Trop 690 > 2870   f/u T3  Tele Monitoring  f/u Echo  likely demand ischemia in the setting of ESRD  Cardio consulted: Dr. Quick p/w elevated troponin  EKG shows NSR with T wave abnormalities  Trop 690 > 2870   Tele Monitoring  Patient had CATH less than a month ago in [place], as per pt, No blockage or stents placed  f/u Echo  likely demand ischemia in the setting of ESRD  Cardio consulted: Dr. Quick

## 2023-11-06 ENCOUNTER — TRANSCRIPTION ENCOUNTER (OUTPATIENT)
Age: 53
End: 2023-11-06

## 2023-11-06 VITALS
SYSTOLIC BLOOD PRESSURE: 156 MMHG | HEART RATE: 70 BPM | DIASTOLIC BLOOD PRESSURE: 80 MMHG | OXYGEN SATURATION: 100 % | TEMPERATURE: 98 F | RESPIRATION RATE: 18 BRPM

## 2023-11-06 LAB
ALBUMIN SERPL ELPH-MCNC: 2.7 G/DL — LOW (ref 3.5–5)
ALBUMIN SERPL ELPH-MCNC: 2.7 G/DL — LOW (ref 3.5–5)
ALP SERPL-CCNC: 83 U/L — SIGNIFICANT CHANGE UP (ref 40–120)
ALP SERPL-CCNC: 83 U/L — SIGNIFICANT CHANGE UP (ref 40–120)
ALT FLD-CCNC: 17 U/L DA — SIGNIFICANT CHANGE UP (ref 10–60)
ALT FLD-CCNC: 17 U/L DA — SIGNIFICANT CHANGE UP (ref 10–60)
ANION GAP SERPL CALC-SCNC: 11 MMOL/L — SIGNIFICANT CHANGE UP (ref 5–17)
ANION GAP SERPL CALC-SCNC: 11 MMOL/L — SIGNIFICANT CHANGE UP (ref 5–17)
AST SERPL-CCNC: 13 U/L — SIGNIFICANT CHANGE UP (ref 10–40)
AST SERPL-CCNC: 13 U/L — SIGNIFICANT CHANGE UP (ref 10–40)
BASOPHILS # BLD AUTO: 0.05 K/UL — SIGNIFICANT CHANGE UP (ref 0–0.2)
BASOPHILS # BLD AUTO: 0.05 K/UL — SIGNIFICANT CHANGE UP (ref 0–0.2)
BASOPHILS NFR BLD AUTO: 0.8 % — SIGNIFICANT CHANGE UP (ref 0–2)
BASOPHILS NFR BLD AUTO: 0.8 % — SIGNIFICANT CHANGE UP (ref 0–2)
BILIRUB SERPL-MCNC: 0.4 MG/DL — SIGNIFICANT CHANGE UP (ref 0.2–1.2)
BILIRUB SERPL-MCNC: 0.4 MG/DL — SIGNIFICANT CHANGE UP (ref 0.2–1.2)
BUN SERPL-MCNC: 95 MG/DL — HIGH (ref 7–18)
BUN SERPL-MCNC: 95 MG/DL — HIGH (ref 7–18)
C3 SERPL-MCNC: 129 MG/DL — SIGNIFICANT CHANGE UP (ref 81–157)
C3 SERPL-MCNC: 129 MG/DL — SIGNIFICANT CHANGE UP (ref 81–157)
C4 SERPL-MCNC: 34 MG/DL — SIGNIFICANT CHANGE UP (ref 13–39)
C4 SERPL-MCNC: 34 MG/DL — SIGNIFICANT CHANGE UP (ref 13–39)
CALCIUM SERPL-MCNC: 8.3 MG/DL — LOW (ref 8.4–10.5)
CALCIUM SERPL-MCNC: 8.3 MG/DL — LOW (ref 8.4–10.5)
CHLORIDE SERPL-SCNC: 102 MMOL/L — SIGNIFICANT CHANGE UP (ref 96–108)
CHLORIDE SERPL-SCNC: 102 MMOL/L — SIGNIFICANT CHANGE UP (ref 96–108)
CO2 SERPL-SCNC: 25 MMOL/L — SIGNIFICANT CHANGE UP (ref 22–31)
CO2 SERPL-SCNC: 25 MMOL/L — SIGNIFICANT CHANGE UP (ref 22–31)
CREAT SERPL-MCNC: 9.1 MG/DL — HIGH (ref 0.5–1.3)
CREAT SERPL-MCNC: 9.1 MG/DL — HIGH (ref 0.5–1.3)
EGFR: 6 ML/MIN/1.73M2 — LOW
EGFR: 6 ML/MIN/1.73M2 — LOW
EOSINOPHIL # BLD AUTO: 1.1 K/UL — HIGH (ref 0–0.5)
EOSINOPHIL # BLD AUTO: 1.1 K/UL — HIGH (ref 0–0.5)
EOSINOPHIL NFR BLD AUTO: 17.9 % — HIGH (ref 0–6)
EOSINOPHIL NFR BLD AUTO: 17.9 % — HIGH (ref 0–6)
GLUCOSE BLDC GLUCOMTR-MCNC: 112 MG/DL — HIGH (ref 70–99)
GLUCOSE BLDC GLUCOMTR-MCNC: 112 MG/DL — HIGH (ref 70–99)
GLUCOSE BLDC GLUCOMTR-MCNC: 79 MG/DL — SIGNIFICANT CHANGE UP (ref 70–99)
GLUCOSE BLDC GLUCOMTR-MCNC: 79 MG/DL — SIGNIFICANT CHANGE UP (ref 70–99)
GLUCOSE SERPL-MCNC: 117 MG/DL — HIGH (ref 70–99)
GLUCOSE SERPL-MCNC: 117 MG/DL — HIGH (ref 70–99)
HCT VFR BLD CALC: 30 % — LOW (ref 39–50)
HCT VFR BLD CALC: 30 % — LOW (ref 39–50)
HGB BLD-MCNC: 9.7 G/DL — LOW (ref 13–17)
HGB BLD-MCNC: 9.7 G/DL — LOW (ref 13–17)
IMM GRANULOCYTES NFR BLD AUTO: 0.3 % — SIGNIFICANT CHANGE UP (ref 0–0.9)
IMM GRANULOCYTES NFR BLD AUTO: 0.3 % — SIGNIFICANT CHANGE UP (ref 0–0.9)
LYMPHOCYTES # BLD AUTO: 0.82 K/UL — LOW (ref 1–3.3)
LYMPHOCYTES # BLD AUTO: 0.82 K/UL — LOW (ref 1–3.3)
LYMPHOCYTES # BLD AUTO: 13.3 % — SIGNIFICANT CHANGE UP (ref 13–44)
LYMPHOCYTES # BLD AUTO: 13.3 % — SIGNIFICANT CHANGE UP (ref 13–44)
MAGNESIUM SERPL-MCNC: 2.4 MG/DL — SIGNIFICANT CHANGE UP (ref 1.6–2.6)
MAGNESIUM SERPL-MCNC: 2.4 MG/DL — SIGNIFICANT CHANGE UP (ref 1.6–2.6)
MCHC RBC-ENTMCNC: 31.3 PG — SIGNIFICANT CHANGE UP (ref 27–34)
MCHC RBC-ENTMCNC: 31.3 PG — SIGNIFICANT CHANGE UP (ref 27–34)
MCHC RBC-ENTMCNC: 32.3 GM/DL — SIGNIFICANT CHANGE UP (ref 32–36)
MCHC RBC-ENTMCNC: 32.3 GM/DL — SIGNIFICANT CHANGE UP (ref 32–36)
MCV RBC AUTO: 96.8 FL — SIGNIFICANT CHANGE UP (ref 80–100)
MCV RBC AUTO: 96.8 FL — SIGNIFICANT CHANGE UP (ref 80–100)
MONOCYTES # BLD AUTO: 0.58 K/UL — SIGNIFICANT CHANGE UP (ref 0–0.9)
MONOCYTES # BLD AUTO: 0.58 K/UL — SIGNIFICANT CHANGE UP (ref 0–0.9)
MONOCYTES NFR BLD AUTO: 9.4 % — SIGNIFICANT CHANGE UP (ref 2–14)
MONOCYTES NFR BLD AUTO: 9.4 % — SIGNIFICANT CHANGE UP (ref 2–14)
NEUTROPHILS # BLD AUTO: 3.58 K/UL — SIGNIFICANT CHANGE UP (ref 1.8–7.4)
NEUTROPHILS # BLD AUTO: 3.58 K/UL — SIGNIFICANT CHANGE UP (ref 1.8–7.4)
NEUTROPHILS NFR BLD AUTO: 58.3 % — SIGNIFICANT CHANGE UP (ref 43–77)
NEUTROPHILS NFR BLD AUTO: 58.3 % — SIGNIFICANT CHANGE UP (ref 43–77)
NRBC # BLD: 0 /100 WBCS — SIGNIFICANT CHANGE UP (ref 0–0)
NRBC # BLD: 0 /100 WBCS — SIGNIFICANT CHANGE UP (ref 0–0)
PHOSPHATE SERPL-MCNC: 4.8 MG/DL — HIGH (ref 2.5–4.5)
PHOSPHATE SERPL-MCNC: 4.8 MG/DL — HIGH (ref 2.5–4.5)
PLATELET # BLD AUTO: 196 K/UL — SIGNIFICANT CHANGE UP (ref 150–400)
PLATELET # BLD AUTO: 196 K/UL — SIGNIFICANT CHANGE UP (ref 150–400)
POTASSIUM SERPL-MCNC: 5.7 MMOL/L — HIGH (ref 3.5–5.3)
POTASSIUM SERPL-MCNC: 5.7 MMOL/L — HIGH (ref 3.5–5.3)
POTASSIUM SERPL-SCNC: 5.7 MMOL/L — HIGH (ref 3.5–5.3)
POTASSIUM SERPL-SCNC: 5.7 MMOL/L — HIGH (ref 3.5–5.3)
PROT SERPL-MCNC: 7 G/DL — SIGNIFICANT CHANGE UP (ref 6–8.3)
PROT SERPL-MCNC: 7 G/DL — SIGNIFICANT CHANGE UP (ref 6–8.3)
RBC # BLD: 3.1 M/UL — LOW (ref 4.2–5.8)
RBC # BLD: 3.1 M/UL — LOW (ref 4.2–5.8)
RBC # FLD: 13.8 % — SIGNIFICANT CHANGE UP (ref 10.3–14.5)
RBC # FLD: 13.8 % — SIGNIFICANT CHANGE UP (ref 10.3–14.5)
SODIUM SERPL-SCNC: 138 MMOL/L — SIGNIFICANT CHANGE UP (ref 135–145)
SODIUM SERPL-SCNC: 138 MMOL/L — SIGNIFICANT CHANGE UP (ref 135–145)
WBC # BLD: 6.15 K/UL — SIGNIFICANT CHANGE UP (ref 3.8–10.5)
WBC # BLD: 6.15 K/UL — SIGNIFICANT CHANGE UP (ref 3.8–10.5)
WBC # FLD AUTO: 6.15 K/UL — SIGNIFICANT CHANGE UP (ref 3.8–10.5)
WBC # FLD AUTO: 6.15 K/UL — SIGNIFICANT CHANGE UP (ref 3.8–10.5)

## 2023-11-06 PROCEDURE — 85025 COMPLETE CBC W/AUTO DIFF WBC: CPT

## 2023-11-06 PROCEDURE — 93306 TTE W/DOPPLER COMPLETE: CPT

## 2023-11-06 PROCEDURE — 71250 CT THORAX DX C-: CPT

## 2023-11-06 PROCEDURE — 71045 X-RAY EXAM CHEST 1 VIEW: CPT

## 2023-11-06 PROCEDURE — 0225U NFCT DS DNA&RNA 21 SARSCOV2: CPT

## 2023-11-06 PROCEDURE — 82803 BLOOD GASES ANY COMBINATION: CPT

## 2023-11-06 PROCEDURE — 85652 RBC SED RATE AUTOMATED: CPT

## 2023-11-06 PROCEDURE — 86038 ANTINUCLEAR ANTIBODIES: CPT

## 2023-11-06 PROCEDURE — 80048 BASIC METABOLIC PNL TOTAL CA: CPT

## 2023-11-06 PROCEDURE — 82553 CREATINE MB FRACTION: CPT

## 2023-11-06 PROCEDURE — 87040 BLOOD CULTURE FOR BACTERIA: CPT

## 2023-11-06 PROCEDURE — 93005 ELECTROCARDIOGRAM TRACING: CPT

## 2023-11-06 PROCEDURE — 83605 ASSAY OF LACTIC ACID: CPT

## 2023-11-06 PROCEDURE — 99285 EMERGENCY DEPT VISIT HI MDM: CPT | Mod: 25

## 2023-11-06 PROCEDURE — 86036 ANCA SCREEN EACH ANTIBODY: CPT

## 2023-11-06 PROCEDURE — 99232 SBSQ HOSP IP/OBS MODERATE 35: CPT

## 2023-11-06 PROCEDURE — 80053 COMPREHEN METABOLIC PANEL: CPT

## 2023-11-06 PROCEDURE — 82595 ASSAY OF CRYOGLOBULIN: CPT

## 2023-11-06 PROCEDURE — 85610 PROTHROMBIN TIME: CPT

## 2023-11-06 PROCEDURE — 82962 GLUCOSE BLOOD TEST: CPT

## 2023-11-06 PROCEDURE — 86682 HELMINTH ANTIBODY: CPT

## 2023-11-06 PROCEDURE — 99261: CPT

## 2023-11-06 PROCEDURE — 86140 C-REACTIVE PROTEIN: CPT

## 2023-11-06 PROCEDURE — 86160 COMPLEMENT ANTIGEN: CPT

## 2023-11-06 PROCEDURE — 99239 HOSP IP/OBS DSCHRG MGMT >30: CPT

## 2023-11-06 PROCEDURE — 84484 ASSAY OF TROPONIN QUANT: CPT

## 2023-11-06 PROCEDURE — 85730 THROMBOPLASTIN TIME PARTIAL: CPT

## 2023-11-06 PROCEDURE — 82550 ASSAY OF CK (CPK): CPT

## 2023-11-06 PROCEDURE — 83735 ASSAY OF MAGNESIUM: CPT

## 2023-11-06 PROCEDURE — 84100 ASSAY OF PHOSPHORUS: CPT

## 2023-11-06 PROCEDURE — 36415 COLL VENOUS BLD VENIPUNCTURE: CPT

## 2023-11-06 PROCEDURE — 80074 ACUTE HEPATITIS PANEL: CPT

## 2023-11-06 PROCEDURE — 94660 CPAP INITIATION&MGMT: CPT

## 2023-11-06 PROCEDURE — 84443 ASSAY THYROID STIM HORMONE: CPT

## 2023-11-06 RX ORDER — LISINOPRIL 2.5 MG/1
1 TABLET ORAL
Refills: 0 | DISCHARGE

## 2023-11-06 RX ORDER — CARVEDILOL PHOSPHATE 80 MG/1
1 CAPSULE, EXTENDED RELEASE ORAL
Refills: 0 | DISCHARGE

## 2023-11-06 RX ORDER — NIFEDIPINE 30 MG
1 TABLET, EXTENDED RELEASE 24 HR ORAL
Refills: 0 | DISCHARGE

## 2023-11-06 RX ORDER — LOVASTATIN 20 MG
1 TABLET ORAL
Refills: 0 | DISCHARGE

## 2023-11-06 RX ORDER — CARVEDILOL PHOSPHATE 80 MG/1
1 CAPSULE, EXTENDED RELEASE ORAL
Qty: 0 | Refills: 0 | DISCHARGE

## 2023-11-06 RX ORDER — TRAZODONE HCL 50 MG
1 TABLET ORAL
Refills: 0 | DISCHARGE

## 2023-11-06 RX ORDER — LISINOPRIL 2.5 MG/1
1 TABLET ORAL
Qty: 1 | Refills: 0
Start: 2023-11-06

## 2023-11-06 RX ORDER — ATORVASTATIN CALCIUM 80 MG/1
1 TABLET, FILM COATED ORAL
Refills: 0 | DISCHARGE

## 2023-11-06 RX ADMIN — LISINOPRIL 20 MILLIGRAM(S): 2.5 TABLET ORAL at 05:30

## 2023-11-06 RX ADMIN — Medication 60 MILLIGRAM(S): at 05:30

## 2023-11-06 RX ADMIN — Medication 667 MILLIGRAM(S): at 08:50

## 2023-11-06 NOTE — DISCHARGE NOTE PROVIDER - PROVIDER TOKENS
PROVIDER:[TOKEN:[[id number]],FOLLOWUP:[1 week]],PROVIDER:[TOKEN:[[id number]],FOLLOWUP:[1 week]] PROVIDER:[TOKEN:[[id number]],FOLLOWUP:[1 week]],PROVIDER:[TOKEN:[[id number]],FOLLOWUP:[1 week]],PROVIDER:[TOKEN:[[id number]],FOLLOWUP:[1 week],ESTABLISHEDPATIENT:[T]]

## 2023-11-06 NOTE — PROGRESS NOTE ADULT - PROBLEM SELECTOR PLAN 7
On oral DM med at home, but takes only when he has heavy meals  - ISS On glipizide 5mg at home, takes only when he has heavy meals as per pt  - ISS Home med:  glipizide 5mg at home, takes only when he has heavy meals as per pt  - ISS

## 2023-11-06 NOTE — DISCHARGE NOTE PROVIDER - ATTENDING DISCHARGE PHYSICAL EXAMINATION:
Patiewnt admitted with SOB noted to have fluid overload along with c/o hemoptysis with sputum also noted eosinophilia  Doing well s/p HD    P/E:  Psych: AAO x3  Neuro: No gross focal deficits; Power and sensation intact  CVS: S1S2 present, regular, no edema  Resp: BLAE+, No wheeze or Rhonchi  GI: Soft, BS+, Non tender, non distended  Extr: No  calf tenderness B/L Lower extremities  Skin: Warm and moist without any rashes    ]Plan:  D/C Home  Outpt f/u Dr. Ervin/ Dr. Ceja Pulm  Follow up Eosinophils  Lisinopril 20 mg ; f/u potassium with next outpt HD; d/w Dr. Griffiths  See discharge instructions reviewed and updated and d/w patient at length

## 2023-11-06 NOTE — PROGRESS NOTE ADULT - PROBLEM SELECTOR PLAN 3
p/w elevated troponin  EKG shows NSR with T wave abnormalities  Trop 690 > 2870 > 1967 >1381  Patient had CATH less than a month ago in [place], as per pt, No blockage or stents placed  11/04 echo: grade III-IV diastolic dysfunction with trace pericardial effusion  likely demand ischemia in the setting of ESRD  Cardio Dr. Quick consulted  - tele Monitoring  - trops downtrending p/w elevated troponin  EKG shows NSR with T wave abnormalities  Trop 690 > 2870 > 1967 >1381  Patient had CATH less than a month ago in [place], as per pt, No blockage or stents placed  11/04 echo: grade III-IV diastolic dysfunction with trace pericardial effusion  likely demand ischemia in the setting of ESRD  Cardio Dr. Quick consulted  - tele Monitoring  - trops downtrending  - advised to follow-up with cardiologist at [place] for ongoing monitoring in 1-2 weeks s/p discharge p/w elevated troponin  EKG shows NSR with T wave abnormalities  Trop 690 > 2870 > 1967 >1381 downtrended  Pt had CATH less than a month ago in [place], as per pt, No blockage or stents placed  11/04 echo: grade III-IV diastolic dysfunction with trace pericardial effusion likely demand ischemia in the setting of ESRD  Cardio Dr. Quick consulted  - tele Monitoring  - advised to follow-up with cardiologist at [place] for ongoing monitoring in 1-2 weeks s/p discharge

## 2023-11-06 NOTE — DISCHARGE NOTE NURSING/CASE MANAGEMENT/SOCIAL WORK - NSDCVIVACCINE_GEN_ALL_CORE_FT
Tdap; 14-Dec-2021 19:18; Roxi Lewis (MARIA ELENA); [employer]; [id number] (Exp. Date: 09-Sep-2023); IntraMuscular; Deltoid Right.; 0.5 milliLiter(s); VIS (VIS Published: 09-May-2013, VIS Presented: 14-Dec-2021);

## 2023-11-06 NOTE — DISCHARGE NOTE PROVIDER - HOSPITAL COURSE
53 year old male from home, ambulates independently at baseline , A&OX3, with PMH of HTN, DM, anemia, ESRD on HD (M/W/F) minimal urine, CHF (EF 33% 10/23, follows at [facility] for cardiology), cirrhosis, and chronic recurrent ascites (goes to [facility] street radiology once ever 3 weeks to get it drained) presenting with sudden shortness of breath. Patient was eating pizza and cherry jello at the time. Also complained of cough w/ bright blood-streaked sputum. In the ED, HR 97, WBC 14k. Patient had crackles on auscultation and 1+ pitting edema on physical examination. Elevated trops (690) at admission. EKG showed NSR with T wave abnormalities. Blood cultures were collected. RVP and COVID negative. CXR showed increased hazy density in R base. Patient was admitted to medicine for CHF exacerbation requiring urgent hemodialysis. Since admission, patient was started on heparin for DVT prophylaxis. Pulmonology Dr. Ervin. nephrology Dr. Griffiths, and cardiology Dr. Quick were consulted. Troponins peaked at 2870 and downtrending to 1300s; elevated trops likely due to demand ischemia in the setting of ESRD/ CT chest showed patchy opacity in R middle and L lower lobes. CRP 61. Blood cultures NGTD. Hemoptysis resolved 3 days into admission. Patient appeared clinically hypervolemic, likely due to CKD exacerbation. Echo in 10/23 revealed LVEF 33%. Repeat echo in 11/04 showed grade 3-4 diastolic dysfunction with trace pericardial effusion. Patient had hemodialysis on 11/02, 11/03, and 11/06 as per nephrology recommendations; pt euvolemic at this time. Patient was saturating well on room air throughout hospital course. Patient was started on ISS and atorvastatin. Pt was continued on home blood pressure medications (carvedilol, lisinopril, and nifedipine) throughout hospital course. Given patient's improved clinical status and current hemodynamic stability, decision was made to discharge the patient. Patient is stable for discharge per attending and is advised to follow up with PCP as outpatient. Please refer to patient's complete medical chart with documents for a full hospital course, for this is only a brief summary.   53 year old male from home, ambulates independently at baseline , A&OX3, with PMH of HTN, DM, anemia, ESRD on HD (M/W/F) minimal urine, CHF (EF 33% 10/23, follows at [facility] for cardiology), cirrhosis, and chronic recurrent ascites (goes to [address] radiology once ever 3 weeks to get it drained) presenting with cough wi bright blood- streaked streaked sputum and  shortness of breath which started while patient was eating. In the ED, /91, HR 97. On physical examination, crackles on auscultation and 1+ pitting edema. Labs WBC 14k, elevated troponin (690). EKG showed NSR with T wave abnormalities. CXR showed increased hazy density in R base. Patient was admitted to medicine for CHF exacerbation requiring urgent hemodialysis    Since admission, troponins peaked at 2870 and downtrending to 1300s;elevated trops likely due to demand ischemia in the setting of ESRD/ CT chest showed patchy opacity in R middle and L lower lobes. Pulmonology Dr. Ervin. nephrology Dr. Griffiths, and cardiology Dr. Quick were consulted.Blood cultures NGTD. Hemoptysis resolved 3 days into admission. Patient appeared clinically hypervolemic, likely due to CKD exacerbation. Echo in 10/23 revealed LVEF 33%. Repeat echo in 11/04 showed grade 3-4 diastolic dysfunction with trace pericardial effusion. Patient had hemodialysis on 11/02, 11/03, and 11/06 as per nephrology recommendations; pt euvolemic at this time.     For his HTN, pt was continued on home blood pressure medications (carvedilol, lisinopril, and nifedipine) throughout hospital course.     For his ESRD, Pt was dialyzed (M/W/F)    For his diabetes, pt takes ........ Pt was started on insulin sliding scale     Patient has been  saturating well on room air throughout hospital course. Given patient's improved clinical status and current hemodynamic stability, decision was made to discharge the patient. Patient is stable for discharge per attending and is advised to follow up with PCP as outpatient. Please refer to patient's complete medical chart with documents for a full hospital course, for this is only a brief summary.   53 year old male from home, ambulates independently at baseline , A&OX3, with PMH of HTN, DM, anemia, ESRD on HD (M/W/F) minimal urine, CHF (EF 33% 10/23, follows at [facility] for cardiology), cirrhosis, and chronic recurrent ascites (goes to [address] radiology once ever 3 weeks to get it drained) presenting with cough with bright blood- streaked streaked sputum and  shortness of breath which started while patient was eating. In the ED, /91, HR 97. On physical examination, crackles on auscultation and 1+ pitting edema. Labs WBC 14k, elevated troponin (690). EKG showed NSR with T wave abnormalities. CXR showed increased hazy density in R base. Patient was admitted to medicine for CHF exacerbation requiring urgent hemodialysis    Since admission, troponins peaked at 2870 and downtrending to 1300s; elevated trops likely due to demand ischemia in the setting of ESRD. CT chest showed patchy opacity in R middle and L lower lobes. Pulmonology Dr. Ervin. nephrology Dr. Griffiths, and cardiology Dr. Quick were consulted. Blood cultures NGTD. Hemoptysis resolved 3 days into admission. Patient appeared clinically hypervolemic, likely due to CKD exacerbation. Echo in 10/23 revealed LVEF 33%. Repeat echo in 11/04 showed grade 3-4 diastolic dysfunction with trace pericardial effusion. Patient had hemodialysis on 11/02, 11/03, and 11/06 as per nephrology recommendations; pt euvolemic at this time.     For his HTN, pt was continued on home blood pressure medications (carvedilol, lisinopril, and nifedipine) throughout hospital course.     For his ESRD, Pt was dialyzed (M/W/F)    For his diabetes, pt takes ........ Pt was started on insulin sliding scale     Patient has been  saturating well on room air throughout hospital course. Given patient's improved clinical status and current hemodynamic stability, decision was made to discharge the patient. Patient is stable for discharge per attending and is advised to follow up with PCP as outpatient. Please refer to patient's complete medical chart with documents for a full hospital course, for this is only a brief summary.   53 year old male from home, ambulates independently at baseline , A&OX3, with PMH of HTN, DM, anemia, ESRD on HD (M/W/F) minimal urine, CHF (EF 33% 10/23, follows at [facility] for cardiology), cirrhosis, and chronic recurrent ascites (goes to [address] radiology once ever 3 weeks to get it drained) presenting with cough with bright blood- streaked streaked sputum and  shortness of breath which started while patient was eating. In the ED, /91, HR 97. On physical examination, crackles on auscultation and 1+ pitting edema. Labs WBC 14k, elevated troponin (690). EKG showed NSR with T wave abnormalities. CXR showed increased hazy density in R base. Patient was admitted to medicine for CHF exacerbation requiring urgent hemodialysis    Since admission, troponins peaked at 2870 and downtrending to 1300s; elevated trops likely due to demand ischemia in the setting of ESRD. CT chest showed patchy opacity in R middle and L lower lobes. Pulmonology Dr. Ervin. nephrology Dr. Griffiths, and cardiology Dr. Quick were consulted. Blood cultures NGTD. Hemoptysis resolved 3 days into admission. Patient appeared clinically hypervolemic, likely due to CKD exacerbation. Echo in 10/23 revealed LVEF 33%. Repeat echo in 11/04 showed grade 3-4 diastolic dysfunction with trace pericardial effusion. Patient had hemodialysis on 11/02, 11/03, and 11/06 as per nephrology recommendations; pt euvolemic at this time.     For his HTN, pt was continued on home blood pressure medications (carvedilol, lisinopril, and nifedipine) throughout hospital course.     For his ESRD, Pt was dialyzed (M/W/F)    For his diabetes, pt takes glipizide 5mg at home. Pt was started on insulin sliding scale.    Patient has been  saturating well on room air throughout hospital course. Given patient's improved clinical status and current hemodynamic stability, decision was made to discharge the patient. Patient is stable for discharge per attending and is advised to follow up with PCP as outpatient. Please refer to patient's complete medical chart with documents for a full hospital course, for this is only a brief summary.   53 year old male from home, ambulates independently at baseline , A&OX3, with PMH of HTN, DM, anemia, ESRD on HD (M/W/F) minimal urine, CHF (EF 33% 10/23, follows at [facility] for cardiology), cirrhosis, and chronic recurrent ascites (goes to [address] radiology once ever 3 weeks to get it drained) presenting with cough with bright blood- streaked streaked sputum and  shortness of breath which started while patient was eating. In the ED, /91, HR 97. On physical examination, crackles on auscultation and 1+ pitting edema. Labs WBC 14k, elevated troponin (690). EKG showed NSR with T wave abnormalities. CXR showed increased hazy density in R base. Patient was admitted to medicine for CHF exacerbation requiring urgent hemodialysis    Since admission, troponins peaked at 2870 and downtrending to 1300s; elevated trops likely due to demand ischemia in the setting of ESRD. CT chest showed patchy opacity in R middle and L lower lobes. Pulmonology Dr. Ervin. nephrology Dr. Griffiths, and cardiology Dr. Quick were consulted. Blood cultures NGTD. Hemoptysis resolved 3 days into admission. Patient appeared clinically hypervolemic, likely due to CKD exacerbation. Echo in 10/23 revealed LVEF 33%. Repeat echo in 11/04 showed grade 3-4 diastolic dysfunction with trace pericardial effusion. Patient had hemodialysis on 11/02, 11/03, and 11/06 as per nephrology recommendations; pt euvolemic at this time.     For his HTN, pt was continued on home blood pressure medications (carvedilol, lisinopril, and nifedipine) throughout hospital course.     For his ESRD, Pt was dialyzed (M/W/F)    For his diabetes, pt takes glipizide 5mg at home. Pt was started on insulin sliding scale.    Patient has been  saturating well on room air throughout hospital course. Given patient's improved clinical status and current hemodynamic stability, decision was made to discharge the patient. Patient is stable for discharge per attending and is advised to follow up with PCP as outpatient. Please refer to patient's complete medical chart with documents for a full hospital course, for this is only a brief summary. 53 year old male from home, ambulates independently at baseline , A&OX3, with PMH of HTN, DM, anemia, ESRD on HD (M/W/F) minimal urine, CHF (EF 33% 10/23, follows at [facility] for cardiology), cirrhosis, and chronic recurrent ascites (goes to [address] radiology once ever 3 weeks to get it drained) presenting with cough with bright blood- streaked streaked sputum and  shortness of breath which started while patient was eating. In the ED, /91, HR 97. On physical examination, crackles on auscultation and 1+ pitting edema. Labs WBC 14k, elevated troponin (690). EKG showed NSR with T wave abnormalities. CXR showed increased hazy density in R base. Patient was admitted to medicine for CHF exacerbation requiring urgent hemodialysis    Since admission, troponins peaked at 2870 and downtrending to 1300s; elevated trops likely due to demand ischemia in the setting of ESRD. CT chest showed patchy opacity in R middle and L lower lobes. Pulmonology Dr. Ervin, nephrology Dr. Griffiths, and cardiology Dr. Quick were consulted. Blood cultures NGTD. Hemoptysis resolved 3 days into admission. Patient appeared clinically hypervolemic, likely due to CKD exacerbation. Echo in 10/23 revealed LVEF 33%. Repeat echo in 11/04 showed grade 3-4 diastolic dysfunction with trace pericardial effusion. Patient had hemodialysis on 11/02, 11/03, and 11/06 as per nephrology recommendations; pt euvolemic at this time.     For his HTN, pt was continued on home blood pressure medications (carvedilol, lisinopril, and nifedipine) throughout hospital course.     For his ESRD, Pt was dialyzed (M/W/F)    For his diabetes, pt takes glipizide 5mg at home. Pt was started on insulin sliding scale.    Patient has been  saturating well on room air throughout hospital course. Given patient's improved clinical status and current hemodynamic stability, decision was made to discharge the patient. Patient is stable for discharge per attending and is advised to follow up with PCP as outpatient. Please refer to patient's complete medical chart with documents for a full hospital course, for this is only a brief summary. 53 year old male from home, ambulates independently at baseline , A&OX3, with PMH of HTN, DM, anemia, ESRD on HD (M/W/F) minimal urine, CHF (EF 33% 10/23, follows at [facility] for cardiology), cirrhosis, and chronic recurrent ascites (goes to [address] radiology once ever 3 weeks to get it drained) presenting with cough with bright blood- streaked streaked sputum and  shortness of breath which started while patient was eating. In the ED, /91, HR 97. On physical examination, crackles on auscultation and 1+ pitting edema. Labs WBC 14k, elevated troponin (690). EKG showed NSR with T wave abnormalities. CXR showed increased hazy density in R base. Patient was admitted to medicine for CHF exacerbation requiring urgent hemodialysis    Since admission, troponin peaked at 2870 and downtrending to 1300s; elevated trops likely due to demand ischemia in the setting of ESRD. CT chest showed patchy opacity in R middle and L lower lobes. Pulmonology Dr. Ervin, nephrology Dr. Griffiths, and cardiology Dr. Quick were consulted. Blood cultures NGTD. Hemoptysis resolved 3 days into admission. Patient appeared clinically hypervolemic, likely due to CKD exacerbation. Echo in 10/23 revealed LVEF 33%. Repeat echo in 11/04 showed grade 3-4 diastolic dysfunction with trace pericardial effusion. Patient had hemodialysis on 11/02, 11/03, and 11/06 as per nephrology recommendations; pt euvolemic at this time.     For his HTN, pt was continued on home blood pressure medications (carvedilol, lisinopril, and nifedipine) throughout hospital course.     For his ESRD, Pt was dialyzed (M/W/F)    For his diabetes, pt takes glipizide 5mg at home. Pt was started on insulin sliding scale.    Patient has been  saturating well on room air throughout hospital course. Given patient's improved clinical status and current hemodynamic stability, decision was made to discharge the patient. Patient is stable for discharge per attending and is advised to follow up with PCP as outpatient. Please refer to patient's complete medical chart with documents for a full hospital course, for this is only a brief summary.

## 2023-11-06 NOTE — PROGRESS NOTE ADULT - SUBJECTIVE AND OBJECTIVE BOX
NEPHROLOGY MEDICAL CARE, [facility] - Dr. Domenic Griffiths/ Dr. Bishnu Amador/ Dr. Fili Smiley/ Dr. Naomie Crowder    Date of Service: 11-06-23    Patient was seen and examined at bedside.    CC: patient is okay and hemoptysis has resolved.    Vital Signs Last 24 Hrs  T(C): 36.9 (06 Nov 2023 13:36), Max: 37.1 (05 Nov 2023 23:58)  T(F): 98.4 (06 Nov 2023 13:36), Max: 98.8 (05 Nov 2023 23:58)  HR: 68 (06 Nov 2023 13:36) (58 - 69)  BP: 160/70 (06 Nov 2023 13:36) (145/77 - 178/86)  BP(mean): --  RR: 19 (06 Nov 2023 13:36) (17 - 19)  SpO2: 95% (06 Nov 2023 13:36) (93% - 100%)    Parameters below as of 06 Nov 2023 13:36  Patient On (Oxygen Delivery Method): room air          PHYSICAL EXAM:  General: No acute respiratory distress.  Eyes: conjunctiva and sclera clear  ENMT: Atraumatic, Normocephalic  Respiratory:   Bilaterally poor lungs; No rales, rhonchi, wheezing  Cardiovascular: S1S2+; no m/r/g  Gastrointestinal: Soft, Non-tender, distended; Bowel sounds present  Neuro:  Awake, Alert & Oriented X#  Ext: 1+ pedal edema, No Cyanosis  Skin: No rashes  Dialysis Access:  Left Upper Arm AVF thrill/bruit+     MEDICATIONS:  MEDICATIONS  (STANDING):  atorvastatin 40 milliGRAM(s) Oral at bedtime  calcium acetate 667 milliGRAM(s) Oral three times a day with meals  carvedilol 6.25 milliGRAM(s) Oral every 12 hours  chlorhexidine 2% Cloths 1 Application(s) Topical daily  heparin   Injectable 5000 Unit(s) SubCutaneous every 12 hours  insulin lispro (ADMELOG) corrective regimen sliding scale   SubCutaneous three times a day before meals  insulin lispro (ADMELOG) corrective regimen sliding scale   SubCutaneous at bedtime  lisinopril 20 milliGRAM(s) Oral daily  NIFEdipine XL 60 milliGRAM(s) Oral daily  traZODone 50 milliGRAM(s) Oral daily    MEDICATIONS  (PRN):  acetaminophen     Tablet .. 650 milliGRAM(s) Oral every 6 hours PRN Temp greater or equal to 38C (100.4F), Mild Pain (1 - 3)  aluminum hydroxide/magnesium hydroxide/simethicone Suspension 30 milliLiter(s) Oral every 4 hours PRN Dyspepsia  melatonin 3 milliGRAM(s) Oral at bedtime PRN Insomnia  ondansetron Injectable 4 milliGRAM(s) IV Push every 8 hours PRN Nausea and/or Vomiting          LABS:                        9.7    6.15  )-----------( 196      ( 06 Nov 2023 10:05 )             30.0     11-06    138  |  102  |  95<H>  ----------------------------<  117<H>  5.7<H>   |  25  |  9.10<H>    Ca    8.3<L>      06 Nov 2023 10:05  Phos  4.8     11-06  Mg     2.4     11-06    TPro  7.0  /  Alb  2.7<L>  /  TBili  0.4  /  DBili  x   /  AST  13  /  ALT  17  /  AlkPhos  83  11-06      Urinalysis Basic - ( 06 Nov 2023 10:05 )    Color: x / Appearance: x / SG: x / pH: x  Gluc: 117 mg/dL / Ketone: x  / Bili: x / Urobili: x   Blood: x / Protein: x / Nitrite: x   Leuk Esterase: x / RBC: x / WBC x   Sq Epi: x / Non Sq Epi: x / Bacteria: x      Magnesium: 2.4 mg/dL (11-06 @ 10:05)  Phosphorus: 4.8 mg/dL (11-06 @ 10:05)    Urine studies    PTH and Vit D:

## 2023-11-06 NOTE — PROGRESS NOTE ADULT - PROVIDER SPECIALTY LIST ADULT
Hospitalist
Internal Medicine
Nephrology
Nephrology
Cardiology
Nephrology
Pulmonology
Cardiology
Internal Medicine
Internal Medicine
Nephrology

## 2023-11-06 NOTE — PROGRESS NOTE ADULT - REASON FOR ADMISSION
CHF Exacerbation

## 2023-11-06 NOTE — PROGRESS NOTE ADULT - TIME BILLING
- Review of records, telemetry, vital signs and daily labs.   - General and cardiovascular physical examination.  - Generation of cardiovascular treatment plan.  - Coordination of care.      Patient was seen and examined by me on 11/4/23 ,interim events noted,labs and radiology studies reviewed.  Solo Quick MD,FACC.  [address] [place], [place] [id number].  [phone]
- Review of records, telemetry, vital signs and daily labs.   - General and cardiovascular physical examination.  - Generation of cardiovascular treatment plan.  - Coordination of care.      Patient was seen and examined by me on 11/5/23 ,interim events noted,labs and radiology studies reviewed.  Solo Quick MD,FACC.  [address] [place], [place] [id number].  [phone]
- personally reviewed pt's labs, VS/flowsheets, pertinent imaging, and consultant notes  - bedside SpO2 measurement to determine supplemental O2 needs  - general pulm hx/exam  - medication reconciliation  - coordination of care with primary team  - dispo planning/counseling of hemoptysis, common etiologies, and return precautions

## 2023-11-06 NOTE — PROGRESS NOTE ADULT - PROBLEM SELECTOR PLAN 5
Echo in 10/23 revealed LVEF 33% and Grade III DD, RV systolic pressure 60mm Hg  Repeat echo 11/04 grade III-IV diastolic dysfunction with trace pericardial effusion  - not on exacerbation

## 2023-11-06 NOTE — DISCHARGE NOTE PROVIDER - NSDCMRMEDTOKEN_GEN_ALL_CORE_FT
atorvastatin 20 mg oral tablet: 1 tab(s) orally once a day  carvedilol 6.25 mg oral tablet: 1 tab(s) orally 2 times a day  lisinopril 20 mg oral tablet: 1 tab(s) orally once a day  NIFEdipine (Eqv-Adalat CC) 60 mg oral tablet, extended release: 1 tab(s) orally once a day   carvedilol 12.5 mg oral tablet: 1 tab(s) orally once a day  glipiZIDE 5 mg oral tablet: 1 tab(s) orally once a day  lisinopril 40 mg oral tablet: 1 tab(s) orally once a day  lovastatin 10 mg oral tablet: 1 tab(s) orally once a day (at bedtime)  NIFEdipine (Eqv-Procardia XL) 90 mg oral tablet, extended release: 1 tab(s) orally once a day  traZODone 100 mg oral tablet: 1 orally once a day (at bedtime)   carvedilol 12.5 mg oral tablet: 1 tab(s) orally 2 times a day  lisinopril 40 mg oral tablet: 1 tab(s) orally once a day  lovastatin 10 mg oral tablet: 1 tab(s) orally once a day (at bedtime)  NIFEdipine (Eqv-Procardia XL) 90 mg oral tablet, extended release: 1 tab(s) orally once a day  traZODone 100 mg oral tablet: 1 orally once a day (at bedtime)   carvedilol 12.5 mg oral tablet: 1 tab(s) orally 2 times a day  lisinopril 20 mg oral tablet: 1 tab(s) orally once a day  lovastatin 10 mg oral tablet: 1 tab(s) orally once a day (at bedtime)  NIFEdipine (Eqv-Procardia XL) 90 mg oral tablet, extended release: 1 tab(s) orally once a day  traZODone 100 mg oral tablet: 1 orally once a day (at bedtime)

## 2023-11-06 NOTE — DISCHARGE NOTE NURSING/CASE MANAGEMENT/SOCIAL WORK - NSDCPEFALRISK_GEN_ALL_CORE
For information on Fall & Injury Prevention, visit: [email or web address] OR  [email or web address] OR  https://www.cdc.gov/steadi/patient.html

## 2023-11-06 NOTE — PROGRESS NOTE ADULT - ASSESSMENT
54yo man active light smoker with PMH ESRD on HD, HFrEF, recurrent ascites, p/w acute dyspnea and abdominal discomfort with new onset low volume hemoptysis for which pulmonary was consulted. Cough with hemoptysis started after bout of emesis - also after which he had cherry jello; R sided airspace opacity seen on CXR. Possible aspiration pneumonitis in setting of uremia and fluid overload state contributing to respiratory sx. CT chest reviewed, non-specific patchy opacities in RML/LLL that can be seen after aspiration of blood.     #Hemoptysis - now resolved  #Aspiration into airway  #Fluid overload    Recommendations:  - CT reviewed as above  - no longer with hemoptysis  - unlikely aspiration PNA based on clinical improvement thus far, can withhold ABx  - aspiration precautions   - fluid removal/HD per primary team/nephrology as tolerated, he has outpatient follow-up already  - discussed return precautions if hemoptysis were to recur after discharge    Please call back with quesitons/concerns

## 2023-11-06 NOTE — DISCHARGE NOTE PROVIDER - CARE PROVIDER_API CALL
Mena Ervin  Pulmonary Disease  [address], [address]  [place] [address]  Phone: [phone]  Fax: [phone]  Follow Up Time: 1 week    Domenic Griffiths  Internal Medicine  [address] [place] [address]  Phone: [phone]  Fax: [phone]  Follow Up Time: 1 week   Mena Ervin  Pulmonary Disease  [address], [address]  [place] [address]  Phone: [phone]  Fax: [phone]  Follow Up Time: 1 week    Domenic Griffiths  Internal Medicine  [address] [place] [address]  Phone: [phone]  Fax: [phone]  Follow Up Time: 1 week    FATOUMATA GAGNON  [address], [place] [address]  Phone: [phone]  Fax: [phone]  Established Patient  Follow Up Time: 1 week

## 2023-11-06 NOTE — PROGRESS NOTE ADULT - SUBJECTIVE AND OBJECTIVE BOX
PULMONARY CONSULT SERVICE FOLLOW-UP NOTE    INTERVAL HPI:  Reviewed chart and overnight events; patient seen and examined at bedside. Seen during HD this AM - feeling overall much better. Has not had any hemoptysis since Saturday and was already clearing up prior to then. Has coughed and clear mucus. Endorses sore throat / feeling raw after having vomited last week. No dyspnea or other respiratory complaints otherwise. Hopeful to go home after HD today.    MEDICATIONS:  Pulmonary:    Antimicrobials:    Anticoagulants:  heparin   Injectable 5000 Unit(s) SubCutaneous every 12 hours    Cardiac:  carvedilol 6.25 milliGRAM(s) Oral every 12 hours  lisinopril 20 milliGRAM(s) Oral daily  NIFEdipine XL 60 milliGRAM(s) Oral daily    Allergies    No Known Allergies    Intolerances    Vital Signs Last 24 Hrs  T(C): 36.9 (06 Nov 2023 10:02), Max: 37.1 (05 Nov 2023 23:58)  T(F): 98.5 (06 Nov 2023 10:02), Max: 98.8 (05 Nov 2023 23:58)  HR: 60 (06 Nov 2023 10:02) (58 - 69)  BP: 178/86 (06 Nov 2023 10:02) (145/77 - 178/86)  BP(mean): --  RR: 19 (06 Nov 2023 08:17) (18 - 19)  SpO2: 95% (06 Nov 2023 08:17) (94% - 100%)    Parameters below as of 06 Nov 2023 08:17  Patient On (Oxygen Delivery Method): room air    PHYSICAL EXAM:  Constitutional: well-appearing man resting comfortably during HD; NAD  HEENT: atraumatic; PERRL, anicteric sclera; MMM  Neck: supple  Cardiovascular: +S1/S2, RRR  Respiratory: few/scant crackles at bases otherwise CTA; respirations non-labored on ambient air  Gastrointestinal: soft, NT/ND  Extremities: WWP; no edema, clubbing or cyanosis  Vascular: 2+ radial pulses B/L  Neurological: awake and alert; ANAYA    LABS:  CBC Full  -  ( 06 Nov 2023 10:05 )  WBC Count : 6.15 K/uL  RBC Count : 3.10 M/uL  Hemoglobin : 9.7 g/dL  Hematocrit : 30.0 %  Platelet Count - Automated : 196 K/uL  Mean Cell Volume : 96.8 fl  Mean Cell Hemoglobin : 31.3 pg  Mean Cell Hemoglobin Concentration : 32.3 gm/dL  Auto Neutrophil # : 3.58 K/uL  Auto Lymphocyte # : 0.82 K/uL  Auto Monocyte # : 0.58 K/uL  Auto Eosinophil # : 1.10 K/uL  Auto Basophil # : 0.05 K/uL  Auto Neutrophil % : 58.3 %  Auto Lymphocyte % : 13.3 %  Auto Monocyte % : 9.4 %  Auto Eosinophil % : 17.9 %  Auto Basophil % : 0.8 %    11-06    138  |  102  |  95<H>  ----------------------------<  117<H>  5.7<H>   |  25  |  9.10<H>    Ca    8.3<L>      06 Nov 2023 10:05  Phos  4.8     11-06  Mg     2.4     11-06    TPro  7.0  /  Alb  2.7<L>  /  TBili  0.4  /  DBili  x   /  AST  13  /  ALT  17  /  AlkPhos  83  11-06      Urinalysis Basic - ( 06 Nov 2023 10:05 )    Color: x / Appearance: x / SG: x / pH: x  Gluc: 117 mg/dL / Ketone: x  / Bili: x / Urobili: x   Blood: x / Protein: x / Nitrite: x   Leuk Esterase: x / RBC: x / WBC x   Sq Epi: x / Non Sq Epi: x / Bacteria: x    RADIOLOGY & ADDITIONAL STUDIES:  < from: CT Chest No Cont (11.04.23 @ 15:24) >  IMPRESSION: Patchy opacities are noted in the right middle and left lower   lobes.    PULMONARY CONSULT SERVICE FOLLOW-UP NOTE    INTERVAL HPI:  Reviewed chart and overnight events; patient seen and examined at bedside. Seen during HD this AM - feeling overall much better. Has not had any hemoptysis since Saturday and was already clearing up prior to then. Has coughed and clear mucus. Endorses sore throat / feeling raw after having vomited last week. No dyspnea or other respiratory complaints otherwise. Hopeful to go home after HD today.    MEDICATIONS:  Pulmonary:    Antimicrobials:    Anticoagulants:  heparin   Injectable 5000 Unit(s) SubCutaneous every 12 hours    Cardiac:  carvedilol 6.25 milliGRAM(s) Oral every 12 hours  lisinopril 20 milliGRAM(s) Oral daily  NIFEdipine XL 60 milliGRAM(s) Oral daily    Allergies    No Known Allergies    Intolerances    Vital Signs Last 24 Hrs  T(C): 36.9 (06 Nov 2023 10:02), Max: 37.1 (05 Nov 2023 23:58)  T(F): 98.5 (06 Nov 2023 10:02), Max: 98.8 (05 Nov 2023 23:58)  HR: 60 (06 Nov 2023 10:02) (58 - 69)  BP: 178/86 (06 Nov 2023 10:02) (145/77 - 178/86)  BP(mean): --  RR: 19 (06 Nov 2023 08:17) (18 - 19)  SpO2: 95% (06 Nov 2023 08:17) (94% - 100%)    Parameters below as of 06 Nov 2023 08:17  Patient On (Oxygen Delivery Method): room air    PHYSICAL EXAM:  Constitutional: well-appearing man resting comfortably during HD; NAD  HEENT: atraumatic; PERRL, anicteric sclera; MMM  Neck: supple  Cardiovascular: +S1/S2, RRR  Respiratory: few/scant crackles at bases otherwise CTA; respirations non-labored on ambient air  Gastrointestinal: soft, NT/ND  Extremities: WWP; no edema, clubbing or cyanosis  Vascular: 2+ radial pulses B/L  Neurological: awake and alert; ANAYA    LABS:  CBC Full  -  ( 06 Nov 2023 10:05 )  WBC Count : 6.15 K/uL  RBC Count : 3.10 M/uL  Hemoglobin : 9.7 g/dL  Hematocrit : 30.0 %  Platelet Count - Automated : 196 K/uL  Mean Cell Volume : 96.8 fl  Mean Cell Hemoglobin : 31.3 pg  Mean Cell Hemoglobin Concentration : 32.3 gm/dL  Auto Neutrophil # : 3.58 K/uL  Auto Lymphocyte # : 0.82 K/uL  Auto Monocyte # : 0.58 K/uL  Auto Eosinophil # : 1.10 K/uL  Auto Basophil # : 0.05 K/uL  Auto Neutrophil % : 58.3 %  Auto Lymphocyte % : 13.3 %  Auto Monocyte % : 9.4 %  Auto Eosinophil % : 17.9 %  Auto Basophil % : 0.8 %    11-06    138  |  102  |  95<H>  ----------------------------<  117<H>  5.7<H>   |  25  |  9.10<H>    Ca    8.3<L>      06 Nov 2023 10:05  Phos  4.8     11-06  Mg     2.4     11-06    TPro  7.0  /  Alb  2.7<L>  /  TBili  0.4  /  DBili  x   /  AST  13  /  ALT  17  /  AlkPhos  83  11-06      Urinalysis Basic - ( 06 Nov 2023 10:05 )    Color: x / Appearance: x / SG: x / pH: x  Gluc: 117 mg/dL / Ketone: x  / Bili: x / Urobili: x   Blood: x / Protein: x / Nitrite: x   Leuk Esterase: x / RBC: x / WBC x   Sq Epi: x / Non Sq Epi: x / Bacteria: x    RADIOLOGY & ADDITIONAL STUDIES (personally reviewed):  < from: CT Chest No Cont (11.04.23 @ 15:24) >  IMPRESSION: Patchy opacities are noted in the right middle and left lower   lobes.

## 2023-11-06 NOTE — PROGRESS NOTE ADULT - PROBLEM SELECTOR PLAN 4
Hx of ESRD on M/W/F  Neprhology Dr. Griffiths consulted  HD on 11/02, 11/03  - Continue HD schedule as per nephro recs  - pt euvolemic at this time  - social work consult Hx of ESRD on M/W/F  Neprhology Dr. Griffiths consulted  HD on 11/02, 11/03, 11/06  - Continue HD schedule as per nephro recs  - pt euvolemic at this time  - social work consult

## 2023-11-06 NOTE — PROGRESS NOTE ADULT - ASSESSMENT
1. ESRD on HD (M/W/F)  -s/p HD today with UF 3.0kg    -Hemodialysis Renal Diet and Fluid restriction to 1L/day  -Adjust meds to eGFR and avoid IV Gadolinium contrast,NSAIDs, and phosphate enema.  -Monitor Electrolytes daily.  2. HTN:   -bp is acceptable at present.  -continue bp meds  -titrate bp meds to keep sbp >110 and < 130  3. Anemia of ESRD:  -F/u CBC daily  -transfuse if HB < 7.0.  4. Mineral Bone Disease:  -continue phoslo tid.   -monitor phos  5. Sob possible fluid overload with uncontrolled HTN  -Hemoptysis has resolved; eosinophilia present and in the past; serology w/u sent and f/u as outpatient.  -f/u with pulmon as outpatient. Ct scan chest noted.  6. Hyperkalemia:  -Use 2K bath during HD  -monitor K.       Discussed with patient in detail regarding the renal plan and care.  d/w Primary Team

## 2023-11-06 NOTE — DISCHARGE NOTE NURSING/CASE MANAGEMENT/SOCIAL WORK - PATIENT PORTAL LINK FT
You can access the FollowMyHealth Patient Portal offered by [facility] by registering at the following website: [email or web address]. By joining [employer]’s FollowMyHealth portal, you will also be able to view your health information using other applications (apps) compatible with our system.

## 2023-11-06 NOTE — DISCHARGE NOTE PROVIDER - NSDCCPCAREPLAN_GEN_ALL_CORE_FT
PRINCIPAL DISCHARGE DIAGNOSIS  Diagnosis: Flash pulmonary edema  Assessment and Plan of Treatment:      PRINCIPAL DISCHARGE DIAGNOSIS  Diagnosis: Hemoptysis  Assessment and Plan of Treatment:       SECONDARY DISCHARGE DIAGNOSES  Diagnosis: Acute hypoxic respiratory failure  Assessment and Plan of Treatment:     Diagnosis: Elevated troponin  Assessment and Plan of Treatment:     Diagnosis: ESRD on dialysis  Assessment and Plan of Treatment:     Diagnosis: CHF exacerbation  Assessment and Plan of Treatment:     Diagnosis: Hypertension  Assessment and Plan of Treatment:     Diagnosis: Diabetes  Assessment and Plan of Treatment:      PRINCIPAL DISCHARGE DIAGNOSIS  Diagnosis: Hemoptysis  Assessment and Plan of Treatment: You presented to the ED with shortness of breath and cough with blood-streaked sputum (hemoptysis). You had a chest-Xray which showed a hazy density in R lung base. You had a chest CT scan that showed patchy opacity in R middle and L lower lobes. You were admitted to medicine for CHF exacerbation requiring urgent hemodialysis. Your blood cultures were negative. Your labs showed elevated inflammatory markers (CRP). Your hemoptysis self-resolved 3 days into admission. Pulmonology (Dr. Ervin) was consulted, did not require antibiotic treatment at this time. You were recommended to return ED if hemoptysis were to recur after discharge. Please follow up with your primary care physician in one week to inform them of your recent hospitalization and further management of your medical conditions.      SECONDARY DISCHARGE DIAGNOSES  Diagnosis: Acute hypoxic respiratory failure  Assessment and Plan of Treatment: You presented to the ED with shortness of breath. You appeared to be in a fluid-overloaded state (hypervolemic) due to exacerbation of your chronic kidney disease; this likely caused your difficulty breathing. Your oxygen levels were normal on room air throughout your stay at the hospital. Nephrology (Dr. Griffiths) was consulted; as per their recommendations, you had hemodialysis on 11/02, 11/03, 11/06. Your repeat echo showed no significant acute findings. You were also on fluid restriction and cardiac DASH diet (low salt, low sodium). Please follow up with your primary care physician / nephrologist in one week to inform them of your recent hospitalization and further management of your medical conditions.    Diagnosis: Elevated troponin  Assessment and Plan of Treatment: You presented to the ED with elevated troponins at 690 (cardiac enzyme labs). In the ED, your EKG showed abnormalities in your T-wave, but otherwise normal.  Since admission, your repeat troponins peaked at 2870 and downtrended to 1300s; this was likely due to extra demand placed on your heart in the setting of your end-stage renal disease. You had a repeat echocardiogram (ultrasound of your heart). Cardiology (Dr. Quick) was consulted. Please follow up with your primary care physician / nephrologist in one week to inform them of your recent hospitalization and further management of your medical conditions    Diagnosis: ESRD on dialysis  Assessment and Plan of Treatment: Nephrology (Dr. Griffiths) was consulted. You had hemodialysis on 11/02, 11/03, 11/06 as per nephrology's recommendations. Please follow up with your primary care physician / nephrologist in one week to inform them of your recent hospitalization and further management of your medical conditions.    Diagnosis: CHF exacerbation  Assessment and Plan of Treatment: You were not in acute distress throughout your hospital stay. You had a repeat echocardiogram (ultrasound of your heart). Cardiology (Dr. Quick) was consulted. Please follow up with your primary care physician / nephrologist in one week to inform them of your recent hospitalization and further management of your medical conditions.    Diagnosis: Hypertension  Assessment and Plan of Treatment: You were continued on home carvedilol 6.25mg twice daily, lisinopril 20mg daily, nifedipine 60mg daily throughout your hospital stay. You were also started on atorvastatin 40mg at bedtime due to your cardiovascular risk factors (history of hypertension and diabetes). Continue current regimen. Please follow up with your primary care physician in one week to inform them of your recent hospitalization and further management of your medical conditions.  CONTINUE CARVEDILOL 6.25 MG TWICE DAILY.  CONTINUE LISINOPRIL 20MG ONCE DAILY.  CONTINUE NIFEDIPINE 60MG ONCE DAILY.  CONTINUE ATORVASTATIN 40MG ONCE DAILY AT BEDTIME.  - Your Blood Pressure was adequately controlled with above regimen.  - You should continue on the current antihypertensive regimen regularly.  - You blood pressure should be within 140-120/80-90.  - You should follow-up with your PCP within 1 week of your discharge for routine blood pressure monitoring at your next visit.  - Notify your doctor if you have any of the following symptoms:   (Dizziness, Lightheadedness, Blurry vision, etc.)    Diagnosis: Diabetes  Assessment and Plan of Treatment: At home, you take oral ___ for diabetes. Since admission, you were started on insulin sliding scale.  Please follow up with your primary care physician in one week to inform them of your recent hospitalization and further management of your medical conditions.     PRINCIPAL DISCHARGE DIAGNOSIS  Diagnosis: Hemoptysis  Assessment and Plan of Treatment: You presented to the ED with shortness of breath and cough with blood-streaked sputum (hemoptysis). You had a chest-Xray which showed a hazy density in R lung base. You had a chest CT scan that showed patchy opacity in R middle and L lower lobes. You were admitted to medicine for CHF exacerbation requiring urgent hemodialysis. Your blood cultures were negative. Your labs showed elevated inflammatory markers (CRP). Your hemoptysis self-resolved 3 days into admission. Pulmonology (Dr. Ervin) was consulted, did not require antibiotic treatment at this time. You were recommended to return ED if hemoptysis were to recur after discharge. Please follow up with your primary care physician in one week to inform them of your recent hospitalization and further management of your medical conditions.      SECONDARY DISCHARGE DIAGNOSES  Diagnosis: Acute hypoxic respiratory failure  Assessment and Plan of Treatment: You presented to the ED with shortness of breath. You appeared to be in a fluid-overloaded state (hypervolemic) due to exacerbation of your chronic kidney disease; this likely caused your difficulty breathing. Your oxygen levels were normal on room air throughout your stay at the hospital. Nephrology (Dr. Griffiths) was consulted; as per their recommendations, you had hemodialysis on 11/02, 11/03, 11/06. Your repeat echo showed no significant acute findings. You were also on fluid restriction and cardiac DASH diet (low salt, low sodium). Please follow up with your primary care physician / nephrologist in one week to inform them of your recent hospitalization and further management of your medical conditions.    Diagnosis: Elevated troponin  Assessment and Plan of Treatment: You presented to the ED with elevated troponins at 690 (cardiac enzyme labs). In the ED, your EKG showed abnormalities in your T-wave, but otherwise normal.  Since admission, your repeat troponins peaked at 2870 and downtrended to 1300s; this was likely due to extra demand placed on your heart in the setting of your end-stage renal disease. You had a repeat echocardiogram (ultrasound of your heart). Cardiology (Dr. Quick) was consulted. Please follow up with your primary care physician / nephrologist in one week to inform them of your recent hospitalization and further management of your medical conditions    Diagnosis: ESRD on dialysis  Assessment and Plan of Treatment: Nephrology (Dr. Griffiths) was consulted. You had hemodialysis on 11/02, 11/03, 11/06 as per nephrology's recommendations. Please follow up with your primary care physician / nephrologist in one week to inform them of your recent hospitalization and further management of your medical conditions.    Diagnosis: CHF exacerbation  Assessment and Plan of Treatment: You were not in acute distress throughout your hospital stay. You had a repeat echocardiogram (ultrasound of your heart). Cardiology (Dr. Quick) was consulted. Please follow up with your primary care physician / nephrologist in one week to inform them of your recent hospitalization and further management of your medical conditions.    Diagnosis: Hypertension  Assessment and Plan of Treatment: You were continued on home carvedilol, lisinopril, nifedipine throughout your hospital stay. You were also started on atorvastatin 40mg at bedtime due to your cardiovascular risk factors (history of hypertension and diabetes). Continue current regimen. Please follow up with your primary care physician in one week to inform them of your recent hospitalization and further management of your medical conditions.  CONTINUE CARVEDILOL.  CONTINUE LISINOPRIL.  CONTINUE NIFEDIPINE.  CONTINUE ATORVASTATIN 40MG ONCE DAILY AT BEDTIME.  - Your Blood Pressure was adequately controlled with above regimen.  - You should continue on the current antihypertensive regimen regularly.  - You blood pressure should be within 140-120/80-90.  - You should follow-up with your PCP within 1 week of your discharge for routine blood pressure monitoring at your next visit.  - Notify your doctor if you have any of the following symptoms:   (Dizziness, Lightheadedness, Blurry vision, etc.)    Diagnosis: Diabetes  Assessment and Plan of Treatment: At home, you take oral glipizide 5mg for diabetes. Since admission, you were started on insulin sliding scale.  Please follow up with your primary care physician in one week to inform them of your recent hospitalization and further management of your medical conditions.     PRINCIPAL DISCHARGE DIAGNOSIS  Diagnosis: Hemoptysis  Assessment and Plan of Treatment: You presented to the ED with shortness of breath and cough with blood-streaked sputum (hemoptysis). You had a chest-Xray which showed a hazy density in R lung base. You had a chest CT scan that showed patchy opacity in R middle and L lower lobes. You were admitted to medicine for CHF exacerbation requiring urgent hemodialysis. Your blood cultures were negative. Your labs showed elevated inflammatory markers (CRP). Your hemoptysis self-resolved 3 days into admission. Pulmonology (Dr. Ervin) was consulted, did not require antibiotic treatment at this time. You have labs that are pending; will contact you if there is any abnormals. You were recommended to return ED if hemoptysis were to recur after discharge. Please follow up with pulmonology in one week to inform them of your recent hospitalization and further management of your medical conditions.      SECONDARY DISCHARGE DIAGNOSES  Diagnosis: Acute hypoxic respiratory failure  Assessment and Plan of Treatment: You presented to the ED with shortness of breath. You appeared to be in a fluid-overloaded state (hypervolemic) due to exacerbation of your chronic kidney disease; this likely caused your difficulty breathing. Your oxygen levels were normal on room air throughout your stay at the hospital. Nephrology (Dr. Griffiths) was consulted; as per their recommendations, you had hemodialysis on 11/02, 11/03, 11/06. You were also on fluid restriction and cardiac DASH diet (low salt, low sodium). Please follow up with your primary care physician / nephrologist in one week to inform them of your recent hospitalization and further management of your medical conditions.    Diagnosis: Elevated troponin  Assessment and Plan of Treatment: You presented to the ED with elevated troponins at 690 (cardiac enzyme labs). In the ED, your EKG showed abnormalities in your T-wave, but otherwise normal.  Since admission, your repeat troponins peaked at 2870 and downtrended to 1300s; this was likely due to extra demand placed on your heart in the setting of your end-stage renal disease. You had a repeat echocardiogram (ultrasound of your heart). Cardiology (Dr. Quick) was consulted. Please follow up with your primary care physician / nephrologist in one week to inform them of your recent hospitalization and further management of your medical conditions    Diagnosis: ESRD on dialysis  Assessment and Plan of Treatment: Nephrology (Dr. Griffiths) was consulted. You had hemodialysis on 11/02, 11/03, 11/06 as per nephrology's recommendations. Please follow up with your primary care physician / nephrologist in one week to inform them of your recent hospitalization and further management of your medical conditions    Diagnosis: CHF exacerbation  Assessment and Plan of Treatment: You were not in acute distress throughout your hospital stay. You had a repeat echocardiogram (ultrasound of your heart); it showed severe left ventricular enlargement, moderate left ventricular systolic dysfunction, and grade 3-4 diastolic dysfunction. Cardiology (Dr. Quick) was consulted. Please follow-up with your cardiologist at [place] outpatient for ongoing monitoring; you were recommended to get MRI of heart to rule out other causes.  Please follow up with your primary care physician / nephrologist in one week to inform them of your recent hospitalization and further management of your medical conditions.    Diagnosis: Hypertension  Assessment and Plan of Treatment: You were continued on home carvedilol, lisinopril, nifedipine throughout your hospital stay. You were also started on atorvastatin 40mg at bedtime due to your cardiovascular risk factors (history of hypertension and diabetes). Continue current regimen. Please follow up with your primary care physician in one week to inform them of your recent hospitalization and further management of your medical conditions.  CONTINUE CARVEDILOL XXXX  CONTINUE LISINOPRIL XXXX  CONTINUE NIFEDIPINE XXXX  CONTINUE ATORVASTATIN XXXX  - Your Blood Pressure was adequately controlled with above regimen.  - You should continue on the current antihypertensive regimen regularly.  - You blood pressure should be within 140-120/80-90.  - You should follow-up with your PCP within 1 week of your discharge for routine blood pressure monitoring at your next visit.  - Notify your doctor if you have any of the following symptoms:   (Dizziness, Lightheadedness, Blurry vision, etc.)    Diagnosis: Diabetes  Assessment and Plan of Treatment: At home, you take oral glipizide 5mg for diabetes. Since admission, you were started on insulin sliding scale.  CONTINUE GLIPIZIDE XXXX  Please follow up with your primary care physician in one week to inform them of your recent hospitalization and further management of your medical conditions.     PRINCIPAL DISCHARGE DIAGNOSIS  Diagnosis: Hemoptysis  Assessment and Plan of Treatment: You presented to the ED with shortness of breath and cough with blood-streaked sputum (hemoptysis). You had a chest-Xray which showed a hazy density in R lung base. You had a chest CT scan that showed patchy opacity in R middle and L lower lobes. You were admitted to medicine for CHF exacerbation requiring urgent hemodialysis. Your blood cultures were negative. Your labs showed elevated inflammatory markers (CRP). Your hemoptysis self-resolved 3 days into admission. Pulmonology (Dr. Ervin) was consulted, did not require antibiotic treatment at this time. You have labs that are pending; will contact you if there is any abnormals. You were recommended to return ED if hemoptysis were to recur after discharge. Please follow up with pulmonology in one week to inform them of your recent hospitalization and further management of your medical conditions.      SECONDARY DISCHARGE DIAGNOSES  Diagnosis: Acute hypoxic respiratory failure  Assessment and Plan of Treatment: You presented to the ED with shortness of breath. You appeared to be in a fluid-overloaded state (hypervolemic) due to exacerbation of your chronic kidney disease; this likely caused your difficulty breathing. Your oxygen levels were normal on room air throughout your stay at the hospital. Nephrology (Dr. Griffiths) was consulted; as per their recommendations, you had hemodialysis on 11/02, 11/03, 11/06. You were also on fluid restriction and cardiac DASH diet (low salt, low sodium). Please follow up with your primary care physician / nephrologist in one week to inform them of your recent hospitalization and further management of your medical conditions.    Diagnosis: Elevated troponin  Assessment and Plan of Treatment: You presented to the ED with elevated troponins at 690 (cardiac enzyme labs). In the ED, your EKG showed abnormalities in your T-wave, but otherwise normal.  Since admission, your repeat troponins peaked at 2870 and downtrended to 1300s; this was likely due to extra demand placed on your heart in the setting of your end-stage renal disease. You had a repeat echocardiogram (ultrasound of your heart). Cardiology (Dr. Quick) was consulted. Please follow up with your primary care physician / nephrologist in one week to inform them of your recent hospitalization and further management of your medical conditions    Diagnosis: ESRD on dialysis  Assessment and Plan of Treatment: Nephrology (Dr. Griffiths) was consulted. You had hemodialysis on 11/02, 11/03, 11/06 as per nephrology's recommendations. Please follow up with your primary care physician / nephrologist in one week to inform them of your recent hospitalization and further management of your medical conditions    Diagnosis: CHF exacerbation  Assessment and Plan of Treatment: You were not in acute distress throughout your hospital stay. You had a repeat echocardiogram (ultrasound of your heart); it showed severe left ventricular enlargement, moderate left ventricular systolic dysfunction, and grade 3-4 diastolic dysfunction. Cardiology (Dr. Quick) was consulted. Please follow-up with your cardiologist at [facility] for ongoing monitoring; you were recommended to get MRI of heart to rule out other causes.  Please follow up with your primary care physician / nephrologist in one week to inform them of your recent hospitalization and further management of your medical conditions.    Diagnosis: Hypertension  Assessment and Plan of Treatment: You were continued on home carvedilol, lisinopril, nifedipine throughout your hospital stay. You were also started on atorvastatin 40mg at bedtime due to your cardiovascular risk factors (history of hypertension and diabetes). Continue current regimen. Please follow up with your primary care physician in one week to inform them of your recent hospitalization and further management of your medical conditions.  CONTINUE CARVEDILOL, LISINOPRIL, NIFEDIPINE   - Your Blood Pressure was adequately controlled with above regimen.  - You should continue on the current antihypertensive regimen regularly.  - You blood pressure should be within 140-120/80-90.  - You should follow-up with your PCP within 1 week of your discharge for routine blood pressure monitoring at your next visit.  - Notify your doctor if you have any of the following symptoms:   (Dizziness, Lightheadedness, Blurry vision, etc.)    Diagnosis: Diabetes  Assessment and Plan of Treatment: At home, you take oral glipizide 5mg for diabetes. Since admission, you were started on insulin sliding scale.  CONTINUE GLIPIZIDE  Please follow up with your primary care physician in one week to inform them of your recent hospitalization and further management of your medical conditions.     PRINCIPAL DISCHARGE DIAGNOSIS  Diagnosis: Acute hypoxic respiratory failure  Assessment and Plan of Treatment: You presented to the ED with shortness of breath. You appeared to be in a fluid-overloaded state (hypervolemic) due to exacerbation of your chronic kidney disease; this likely caused your difficulty breathing. Your oxygen levels were normal on room air throughout your stay at the hospital. Nephrology (Dr. Griffiths) was consulted; as per their recommendations, you had hemodialysis on 11/02, 11/03, 11/06. You were also on fluid restriction and cardiac DASH diet (low salt, low sodium). Please follow up with your primary care physician / nephrologist in one week to inform them of your recent hospitalization and further management of your medical conditions.      SECONDARY DISCHARGE DIAGNOSES  Diagnosis: Elevated troponin  Assessment and Plan of Treatment: You presented to the ED with elevated troponins at 690 (cardiac enzyme labs). In the ED, your EKG showed abnormalities in your T-wave, but otherwise normal.  Since admission, your repeat troponins peaked at 2870 and downtrended to 1300s; this was likely due to extra demand placed on your heart in the setting of your end-stage renal disease. You had a repeat echocardiogram (ultrasound of your heart). Cardiology (Dr. Quick) was consulted. Please follow up with your primary care physician / nephrologist in one week to inform them of your recent hospitalization and further management of your medical conditions    Diagnosis: ESRD on dialysis  Assessment and Plan of Treatment: Nephrology (Dr. Griffiths) was consulted. You had hemodialysis on 11/02, 11/03, 11/06 as per nephrology's recommendations. Please follow up with your primary care physician / nephrologist in one week to inform them of your recent hospitalization and further management of your medical conditions    Diagnosis: CHF exacerbation  Assessment and Plan of Treatment: You were not in acute distress throughout your hospital stay. You had a repeat echocardiogram (ultrasound of your heart); it showed severe left ventricular enlargement, moderate left ventricular systolic dysfunction, and grade 3-4 diastolic dysfunction. Cardiology (Dr. Quick) was consulted. Please follow-up with your cardiologist at [facility] for ongoing monitoring; you were recommended to get MRI of heart to rule out other causes.  Please follow up with your primary care physician / nephrologist in one week to inform them of your recent hospitalization and further management of your medical conditions.    Diagnosis: Hypertension  Assessment and Plan of Treatment: You were continued on home carvedilol, lisinopril, nifedipine throughout your hospital stay. You were also started on atorvastatin 40mg at bedtime due to your cardiovascular risk factors (history of hypertension and diabetes). Continue current regimen. Please follow up with your primary care physician in one week to inform them of your recent hospitalization and further management of your medical conditions.  CONTINUE CARVEDILOL, LISINOPRIL, NIFEDIPINE   - Your Blood Pressure was adequately controlled with above regimen.  - You should continue on the current antihypertensive regimen regularly.  - You blood pressure should be within 140-120/80-90.  - You should follow-up with your PCP within 1 week of your discharge for routine blood pressure monitoring at your next visit.  - Notify your doctor if you have any of the following symptoms:   (Dizziness, Lightheadedness, Blurry vision, etc.)    Diagnosis: Diabetes  Assessment and Plan of Treatment: During your hospitilzation your sugar levels were well controlled and on the lower end. We recommend you discontinue any diabetic medications including your jardiance.   Please follow up with your primary care physician in one week to inform them of your recent hospitalization and further management of your medical conditions and if needed an endocrinology consult    Diagnosis: Hemoptysis  Assessment and Plan of Treatment: You presented to the ED with shortness of breath and cough with blood-streaked sputum (hemoptysis). You had a chest-Xray which showed a hazy density in R lung base. You had a chest CT scan that showed patchy opacity in R middle and L lower lobes. You were admitted to medicine for CHF exacerbation requiring urgent hemodialysis. Your blood cultures were negative. Your labs showed elevated inflammatory markers (CRP). Your hemoptysis self-resolved 3 days into admission. Pulmonology (Dr. Ervin) was consulted, did not require antibiotic treatment at this time. You have labs that are pending; will contact you if there is any abnormals. You were recommended to return ED if hemoptysis were to recur after discharge. Please follow up with pulmonology in one week to inform them of your recent hospitalization and further management of your medical conditions.    Diagnosis: Cardiomyopathy  Assessment and Plan of Treatment: You were found to have systolic and diastolic dysfunction of your heart. You also have history of hemoptysis. One of the causes of cardiomyopathy is amyloidosis. We recommend you follow up with your cardiologist for further workup.    Diagnosis: Hyperkalemia  Assessment and Plan of Treatment: Your potassium was 5.7 this morning before dialysis. Dialysis lowers your potassium. Please make sure to continue going to your HD sessions so your potassium levels stay within normal limits     PRINCIPAL DISCHARGE DIAGNOSIS  Diagnosis: Acute hypoxic respiratory failure  Assessment and Plan of Treatment: You presented to the ED with worsening shortness of breath. You appeared to be in a fluid-overloaded state (hypervolemic) due to exacerbation of your heart failure potentially from elevated Blood Pressure; this likely caused your difficulty breathing. Your oxygen levels were normal on room air throughout your stay at the hospital after you got dialyzed. Nephrology (Dr. Griffiths) was consulted; as per their recommendations, you had hemodialysis on 11/02, 11/03, 11/06. You were also on fluid restriction and cardiac DASH diet (low salt, low sodium). Please follow up with your primary care physician / nephrologist in one week to inform them of your recent hospitalization and further management of your medical conditions.      SECONDARY DISCHARGE DIAGNOSES  Diagnosis: Elevated troponin  Assessment and Plan of Treatment: You presented to the ED with elevated troponins at 690 (cardiac enzyme labs). In the ED, your EKG showed abnormalities in your T-wave, but otherwise normal.  Since admission, your repeat troponins peaked at 2870 and downtrended to 1300s; this was likely due to extra demand placed on your heart in the setting of your end-stage renal disease and fluid overload. You had a repeat echocardiogram (ultrasound of your heart) which was negative for acute changes. Cardiology (Dr. Quick) was consulted. Please follow up with your primary care physician / nephrologist in one week to inform them of your recent hospitalization and further management of your medical conditions  PLEASE FOLLOW UP WITH YOUR HEART SPECIALIST    Diagnosis: ESRD on dialysis  Assessment and Plan of Treatment: Nephrology (Dr. Griffiths) was consulted. You had hemodialysis on 11/02, 11/03, 11/06 as per nephrology's recommendations. Please follow up with your primary care physician / nephrologist in one week to inform them of your recent hospitalization and further management of your medical conditions; PLEASE GET A REPEAT POTASSIUM WITH NEXT DIALYSIS ON WEDNESDAY    Diagnosis: CHF exacerbation  Assessment and Plan of Treatment: You were not in acute distress throughout your hospital stay. You had a repeat echocardiogram (ultrasound of your heart); it showed severe left ventricular enlargement, moderate left ventricular systolic dysfunction, and grade 3-4 diastolic dysfunction. Cardiology (Dr. Quick) was consulted. Please follow-up with your cardiologist at [facility] outpatient for ongoing monitoring; you were recommended to get MRI of heart to rule out other causes.  Please follow up with your primary care physician / nephrologist in one week to inform them of your recent hospitalization and further management of your medical conditions.    Diagnosis: Hypertension  Assessment and Plan of Treatment: You were continued on home carvedilol, lisinopril, nifedipine throughout your hospital stay. You were also started on atorvastatin 40mg at bedtime due to your cardiovascular risk factors (history of hypertension and diabetes). Continue current regimen. Please follow up with your primary care physician in one week to inform them of your recent hospitalization and further management of your medical conditions.  CONTINUE CARVEDILOL, LISINOPRIL, NIFEDIPINE   - Your Blood Pressure was adequately controlled with above regimen.  - You should continue on the current antihypertensive regimen regularly.  - You blood pressure should be within 140-120/80-90.  - You should follow-up with your PCP within 1 week of your discharge for routine blood pressure monitoring at your next visit.  - Notify your doctor if you have any of the following symptoms:   (Dizziness, Lightheadedness, Blurry vision, etc.)    Diagnosis: Hemoptysis  Assessment and Plan of Treatment: You presented to the ED with shortness of breath and you reported cough with blood-streaked sputum (hemoptysis). You had a chest-Xray which showed a hazy density in Right  lung base. You had a chest CT scan that showed patchy opacity in Right middle and Left  lower lobes. You did not had any fever or elevated WBC count to suggest any Pneumonia and was not given any antyibitics. You were admitted to medicine for CHF exacerbation requiring urgent hemodialysis. Your blood cultures were negative. Your labs showed elevated inflammatory markers (CRP). Your hemoptysis self-resolved 3 days into admission. Pulmonology (Dr. Reese) was consulted, did not require antibiotic treatment at this time.  You were recommended to return ED if hemoptysis were to recur after discharge. Please follow up with pulmonology in one week to inform them of your recent hospitalization and further management of your medical conditions.  PLEASE FOLLOW UP WITH DR. REESE OR DR. PEREZ LUNG SPECIALIST  IN 2 TO 3 WEKS WEEKS AS OUTPATIENT TO FOLLOW UP ON ELEVATED ESOSINOPHILS AND BLOOD IN SPUTUM    Diagnosis: Diabetes  Assessment and Plan of Treatment: During your hospitilzation your sugar levels were well controlled and on the lower end. We recommend you discontinue any diabetic medications including your jardiance.   Please follow up with your primary care physician in one week to inform them of your recent hospitalization and further management of your medical conditions and if needed an endocrinology consult    Diagnosis: Cardiomyopathy  Assessment and Plan of Treatment: You were found to have systolic and diastolic dysfunction of your heart. You also have history of hemoptysis. One of the causes of cardiomyopathy is amyloidosis. We recommend you follow up with your cardiologist for further workup.    Diagnosis: Hyperkalemia  Assessment and Plan of Treatment: Your potassium was 5.7 this morning before dialysis. Dialysis lowers your potassium. Please make sure to continue going to your HD sessions so your potassium levels stay within normal limits

## 2023-11-06 NOTE — DISCHARGE NOTE PROVIDER - NSDCFUADDINST_GEN_ALL_CORE_FT
Pending labs; will contact if there are any abnormalities. Recommend follow-up with cardiologist at [place] outpatient; recommended to get MRI to rule out amyloid disease vs. other.

## 2023-11-06 NOTE — PROGRESS NOTE ADULT - PROBLEM SELECTOR PLAN 1
11/02 pt complains of hemoptysis one week ago that has reoccurred on this admission  Pt with streaks of blood in the cup; bright red in color  Likely due to Pul HTN vs parasitic infection vs Autoimmune  Pulm Dr. Ervin consulted  10/02 CXR: increased hazy density in R base  10/04 CT chest: patchy opacity in R middle and L lower lobes  CRP 61,  - f/u JONAS, ESR, ANCA, Cryoglobulin, Strongyloides Ab  - please monitor for bleeding 11/02 pt complains of hemoptysis one week ago that has reoccurred on this admission  Pt with streaks of blood in the cup; bright red in color  Likely due to Pul HTN vs parasitic infection vs Autoimmune  Pulm Dr. Ervin consulted  10/02 CXR: increased hazy density in R base  10/04 CT chest: patchy opacity in R middle and L lower lobes  CRP 61,  - pending JONAS, ESR, ANCA, Cryoglobulin, Strongyloides Ab -- will f/u with pt if abnormal  - monitor for bleeding Pt appears clinically hypervolemic, likely cause of SOB  (CKD exacerbation)  10/23 echo revealed LVEF 33%  11/04 repeat echo: grade III-IV diastolic dysfunction with trace pericardial effusion  Nephrology Dr. Griffiths consulted  Cardiology Dr. Quick consulted  Patient is saturating well on RA (>95% pO2)  11/02 CXR: increased hazy density in R base  11/04 CT chest: patchy opacity in R middle and L lower lobes  s/p HD on 11/02 and 11/03; HD today 11/06  - Strict I&O  - Daily weights  - Fluid restriction  - Cardiac/DASH Diet: Low salt, low cholesterol diet  - advised to follow-up with Dr. Griffiths outpatient for ongoing monitoring in 1-2 weeks s/p discharge

## 2023-11-06 NOTE — PROGRESS NOTE ADULT - PROBLEM SELECTOR PLAN 2
Pt appears clinically hypervolemic, likely cause of SoB  (CKD exacerbation)  10/23 echo revealed LVEF 33%  11/04 repeat echo: grade III-IV diastolic dysfunction with trace pericardial effusion  Nephrology Dr. Griffiths consulted  Cardiology Dr. Quick consulted  Patient is saturating well on RA (>95% pO2)  - CXR and CT chest as above  - s/p HD on 11/03; HD today 11/06  - Strict I&O  - Daily weights  - Fluid restriction  - Cardiac/DASH Diet: Low salt, low cholesterol diet Pt appears clinically hypervolemic, likely cause of SoB  (CKD exacerbation)  10/23 echo revealed LVEF 33%  11/04 repeat echo: grade III-IV diastolic dysfunction with trace pericardial effusion  Nephrology Dr. Griffiths consulted  Cardiology Dr. Quick consulted  Patient is saturating well on RA (>95% pO2)  - CXR and CT chest as above  - s/p HD on 11/02 and 11/03; HD today 11/06  - Strict I&O  - Daily weights  - Fluid restriction  - Cardiac/DASH Diet: Low salt, low cholesterol diet  - advised to follow-up with Dr. Griffiths outpatient for ongoing monitoring in 1-2 weeks s/p discharge On this admission,  pt complained of coughing with streaks of blood in the cup; bright red in color  Likely due to Pul HTN vs parasitic infection vs Autoimmune  Pulm Dr. Ervin consulted  11/02 CXR: increased hazy density in R base  11/04 CT chest: patchy opacity in R middle and L lower lobes  CRP 61,  - pending JONAS, ESR, ANCA, Cryoglobulin, Strongyloides Ab -- will f/u with pt if abnormal  - monitor for bleeding

## 2023-11-06 NOTE — PROGRESS NOTE ADULT - SUBJECTIVE AND OBJECTIVE BOX
PGY-1 Progress Note discussed with attending    PAGER #: [[phone]] TILL 5:00 PM  PLEASE CONTACT ON CALL TEAM:  - On Call Team (Please refer to Alva) FROM 5:00 PM - 8:30PM  - Nightfloat Team FROM 8:30 -7:30 AM    INTERVAL HPI/OVERNIGHT EVENTS:       REVIEW OF SYSTEMS:  CONSTITUTIONAL: No fever, chills, weight loss, or fatigue  RESPIRATORY: No cough, wheezing, hemoptysis; No shortness of breath  CARDIOVASCULAR: No chest pain, palpitations, dizziness, or leg swelling  GASTROINTESTINAL: No abdominal pain. No nausea, vomiting, or hematemesis; No diarrhea or constipation. No bloody or black stool  GENITOURINARY: No dysuria or hematuria, urinary frequency  NEUROLOGICAL: No headaches, tremors  SKIN: No itching, burning, rashes, or lesions     Vital Signs Last 24 Hrs  T(C): 36.6 (06 Nov 2023 04:37), Max: 37.1 (05 Nov 2023 23:58)  T(F): 97.9 (06 Nov 2023 04:37), Max: 98.8 (05 Nov 2023 23:58)  HR: 58 (06 Nov 2023 04:37) (58 - 69)  BP: 151/77 (06 Nov 2023 04:37) (142/67 - 163/73)  BP(mean): --  RR: 18 (06 Nov 2023 04:37) (18 - 18)  SpO2: 95% (06 Nov 2023 04:37) (94% - 100%)    Parameters below as of 06 Nov 2023 04:37  Patient On (Oxygen Delivery Method): room air        PHYSICAL EXAMINATION:  GENERAL: NAD,   HEAD:  Atraumatic, Normocephalic  EYES:  PERRLA,  conjunctiva and sclera clear  NECK: Supple, No JVD,   CHEST/LUNG: Clear to auscultation. Clear to percussion bilaterally; No rales, rhonchi, wheezing, or rubs  HEART: Regular rate and rhythm; No murmurs, rubs, or gallops  ABDOMEN: Soft, Nontender, Nondistended; Bowel sounds present, No Rovsing's sign   NERVOUS SYSTEM:  Alert & Oriented X3,    EXTREMITIES:  2+ Peripheral Pulses, No clubbing, cyanosis, or edema  CALF: No Calf tenderness   SKIN: warm dry                      CAPILLARY BLOOD GLUCOSE      RADIOLOGY & ADDITIONAL TESTS:                   PGY-1 Progress Note discussed with attending    PAGER #: [[phone]] TILL 5:00 PM  PLEASE CONTACT ON CALL TEAM:  - On Call Team (Please refer to Alva) FROM 5:00 PM - 8:30PM  - Nightfloat Team FROM 8:30 -7:30 AM    INTERVAL HPI/OVERNIGHT EVENTS: No acute overnight events. States feeling better today compared to yesterday. Did not sleep well overnight; states only being able to stay asleep for 3 hours. Takes trazodone 50mg at home, occasionally 100mg, to help with sleep; interested in sleep study outpatient. Patient also states that his hemoptysis resolved yesterday evening. Complains of ongoing intermittent dyspnea and orthopnea. Patient states that his baseline systolic bp is 160-190s on days he goes for dialysis; last HD session prior to admission 11/01. Patient also states that he usually vomits "green phlegm" after his dialysis sessions. Otherwise, no acute complaints. Today, denies CP, palpitations, cough, n/v, constipation, diarrhea, HA, dizziness, and other pertinent sx.      REVIEW OF SYSTEMS:  CONSTITUTIONAL: No fever, chills, weight loss, or fatigue  RESPIRATORY: (+) dyspnea, orthopnea. No cough, wheezing, hemoptysis  CARDIOVASCULAR: No chest pain, palpitations, dizziness, or leg swelling  GASTROINTESTINAL: No abdominal pain. No nausea, vomiting, or hematemesis; No diarrhea or constipation. No bloody or black stool  GENITOURINARY: No dysuria or hematuria, urinary frequency  NEUROLOGICAL: (+) sleep disturbance; No headaches, tremors  SKIN: No itching, burning, rashes, or lesions     Vital Signs Last 24 Hrs  T(C): 36.6 (06 Nov 2023 04:37), Max: 37.1 (05 Nov 2023 23:58)  T(F): 97.9 (06 Nov 2023 04:37), Max: 98.8 (05 Nov 2023 23:58)  HR: 58 (06 Nov 2023 04:37) (58 - 69)  BP: 151/77 (06 Nov 2023 04:37) (142/67 - 163/73)  BP(mean): --  RR: 18 (06 Nov 2023 04:37) (18 - 18)  SpO2: 95% (06 Nov 2023 04:37) (94% - 100%)    Parameters below as of 06 Nov 2023 04:37  Patient On (Oxygen Delivery Method): room air        PHYSICAL EXAMINATION:  GENERAL: NAD,   HEAD:  Atraumatic, Normocephalic  EYES:  PERRLA,  conjunctiva and sclera clear  NECK: Supple, No JVD,   CHEST/LUNG: Clear to auscultation. Clear to percussion bilaterally; No rales, rhonchi, wheezing, or rubs  HEART: Regular rate and rhythm; No murmurs, rubs, or gallops  ABDOMEN: Soft, Nontender, Nondistended; Bowel sounds present, No Rovsing's sign   NERVOUS SYSTEM:  Alert & Oriented X3,    EXTREMITIES:  2+ Peripheral Pulses, No clubbing, cyanosis, or edema  CALF: No Calf tenderness   SKIN: warm dry                      CAPILLARY BLOOD GLUCOSE      RADIOLOGY & ADDITIONAL TESTS:

## 2023-11-07 LAB
ANA TITR SER: NEGATIVE — SIGNIFICANT CHANGE UP
ANA TITR SER: NEGATIVE — SIGNIFICANT CHANGE UP
CULTURE RESULTS: SIGNIFICANT CHANGE UP
SPECIMEN SOURCE: SIGNIFICANT CHANGE UP
STRONGYLOIDES AB SER-ACNC: NEGATIVE — SIGNIFICANT CHANGE UP
STRONGYLOIDES AB SER-ACNC: NEGATIVE — SIGNIFICANT CHANGE UP

## 2023-11-08 LAB
AUTO DIFF PNL BLD: NEGATIVE — SIGNIFICANT CHANGE UP
AUTO DIFF PNL BLD: NEGATIVE — SIGNIFICANT CHANGE UP
C-ANCA SER-ACNC: NEGATIVE — SIGNIFICANT CHANGE UP
C-ANCA SER-ACNC: NEGATIVE — SIGNIFICANT CHANGE UP
CRYOGLOB SERPL-MCNC: NEGATIVE — SIGNIFICANT CHANGE UP
CRYOGLOB SERPL-MCNC: NEGATIVE — SIGNIFICANT CHANGE UP
MPO AB + PR3 PNL SER: SIGNIFICANT CHANGE UP
MPO AB + PR3 PNL SER: SIGNIFICANT CHANGE UP
P-ANCA SER-ACNC: NEGATIVE — SIGNIFICANT CHANGE UP
P-ANCA SER-ACNC: NEGATIVE — SIGNIFICANT CHANGE UP

## 2023-12-07 ENCOUNTER — APPOINTMENT (OUTPATIENT)
Dept: SURGERY | Facility: CLINIC | Age: 53
End: 2023-12-07
Payer: MEDICARE

## 2023-12-07 VITALS
BODY MASS INDEX: 28.42 KG/M2 | HEIGHT: 71 IN | WEIGHT: 203 LBS | SYSTOLIC BLOOD PRESSURE: 167 MMHG | DIASTOLIC BLOOD PRESSURE: 85 MMHG | HEART RATE: 63 BPM | OXYGEN SATURATION: 96 %

## 2023-12-07 DIAGNOSIS — L02.91 CUTANEOUS ABSCESS, UNSPECIFIED: ICD-10-CM

## 2023-12-07 PROCEDURE — 99213 OFFICE O/P EST LOW 20 MIN: CPT | Mod: 25

## 2023-12-07 PROCEDURE — 10060 I&D ABSCESS SIMPLE/SINGLE: CPT

## 2023-12-14 LAB — BACTERIA WND CULT: ABNORMAL

## 2024-06-18 NOTE — H&P PST ADULT - NEGATIVE CARDIOVASCULAR SYMPTOMS
Abdomen , soft, nontender, nondistended , no guarding or rigidity , no masses palpable , normal bowel sounds , Liver and Spleen , no hepatomegaly present , no hepatosplenomegaly , liver nontender , spleen not palpable no chest pain/no palpitations/no peripheral edema

## 2024-07-08 NOTE — ASU PREOP CHECKLIST - ISOLATION PRECAUTIONS
Today you were seen for your annual Medicare wellness exam and follow-up of your chronic conditions and your medications were refilled you also had a cortisone shot in your right knee monitor your blood sugar closely and follow-up if symptoms do not improve please also watch for signs of infection otherwise follow-up in 1 year otherwise as needed   none

## 2024-11-18 ENCOUNTER — INPATIENT (INPATIENT)
Facility: HOSPITAL | Age: 54
LOS: 6 days | Discharge: HOME | DRG: 392 | End: 2024-11-25
Attending: STUDENT IN AN ORGANIZED HEALTH CARE EDUCATION/TRAINING PROGRAM | Admitting: STUDENT IN AN ORGANIZED HEALTH CARE EDUCATION/TRAINING PROGRAM
Payer: MEDICARE

## 2024-11-18 VITALS
DIASTOLIC BLOOD PRESSURE: 80 MMHG | HEIGHT: 71 IN | TEMPERATURE: 98 F | WEIGHT: 207.9 LBS | SYSTOLIC BLOOD PRESSURE: 149 MMHG | RESPIRATION RATE: 18 BRPM | OXYGEN SATURATION: 99 % | HEART RATE: 56 BPM

## 2024-11-18 DIAGNOSIS — I50.22 CHRONIC SYSTOLIC (CONGESTIVE) HEART FAILURE: ICD-10-CM

## 2024-11-18 DIAGNOSIS — R10.9 UNSPECIFIED ABDOMINAL PAIN: ICD-10-CM

## 2024-11-18 DIAGNOSIS — Z29.9 ENCOUNTER FOR PROPHYLACTIC MEASURES, UNSPECIFIED: ICD-10-CM

## 2024-11-18 DIAGNOSIS — I77.0 ARTERIOVENOUS FISTULA, ACQUIRED: Chronic | ICD-10-CM

## 2024-11-18 DIAGNOSIS — N18.6 END STAGE RENAL DISEASE: ICD-10-CM

## 2024-11-18 DIAGNOSIS — I10 ESSENTIAL (PRIMARY) HYPERTENSION: ICD-10-CM

## 2024-11-18 DIAGNOSIS — Z87.898 PERSONAL HISTORY OF OTHER SPECIFIED CONDITIONS: ICD-10-CM

## 2024-11-18 DIAGNOSIS — E87.8 OTHER DISORDERS OF ELECTROLYTE AND FLUID BALANCE, NOT ELSEWHERE CLASSIFIED: ICD-10-CM

## 2024-11-18 DIAGNOSIS — I50.42 CHRONIC COMBINED SYSTOLIC (CONGESTIVE) AND DIASTOLIC (CONGESTIVE) HEART FAILURE: ICD-10-CM

## 2024-11-18 DIAGNOSIS — Z98.890 OTHER SPECIFIED POSTPROCEDURAL STATES: Chronic | ICD-10-CM

## 2024-11-18 DIAGNOSIS — E78.5 HYPERLIPIDEMIA, UNSPECIFIED: ICD-10-CM

## 2024-11-18 DIAGNOSIS — R00.1 BRADYCARDIA, UNSPECIFIED: ICD-10-CM

## 2024-11-18 DIAGNOSIS — E11.9 TYPE 2 DIABETES MELLITUS WITHOUT COMPLICATIONS: ICD-10-CM

## 2024-11-18 LAB
ALBUMIN SERPL ELPH-MCNC: 3.9 G/DL — SIGNIFICANT CHANGE UP (ref 3.5–5)
ALP SERPL-CCNC: 85 U/L — SIGNIFICANT CHANGE UP (ref 40–120)
ALT FLD-CCNC: 20 U/L DA — SIGNIFICANT CHANGE UP (ref 10–60)
ANION GAP SERPL CALC-SCNC: 8 MMOL/L — SIGNIFICANT CHANGE UP (ref 5–17)
AST SERPL-CCNC: 35 U/L — SIGNIFICANT CHANGE UP (ref 10–40)
BASE EXCESS BLDV CALC-SCNC: 5.8 MMOL/L — SIGNIFICANT CHANGE UP
BASOPHILS # BLD AUTO: 0.05 K/UL — SIGNIFICANT CHANGE UP (ref 0–0.2)
BASOPHILS NFR BLD AUTO: 0.6 % — SIGNIFICANT CHANGE UP (ref 0–2)
BILIRUB SERPL-MCNC: 0.7 MG/DL — SIGNIFICANT CHANGE UP (ref 0.2–1.2)
BLD GP AB SCN SERPL QL: SIGNIFICANT CHANGE UP
BLOOD GAS COMMENTS, VENOUS: SIGNIFICANT CHANGE UP
BUN SERPL-MCNC: 39 MG/DL — HIGH (ref 7–18)
CA-I SERPL-SCNC: SIGNIFICANT CHANGE UP MMOL/L (ref 1.15–1.33)
CALCIUM SERPL-MCNC: 9.4 MG/DL — SIGNIFICANT CHANGE UP (ref 8.4–10.5)
CHLORIDE SERPL-SCNC: 100 MMOL/L — SIGNIFICANT CHANGE UP (ref 96–108)
CO2 SERPL-SCNC: 26 MMOL/L — SIGNIFICANT CHANGE UP (ref 22–31)
CREAT SERPL-MCNC: 5.13 MG/DL — HIGH (ref 0.5–1.3)
EGFR: 12 ML/MIN/1.73M2 — LOW
EOSINOPHIL # BLD AUTO: 0.72 K/UL — HIGH (ref 0–0.5)
EOSINOPHIL NFR BLD AUTO: 8.6 % — HIGH (ref 0–6)
GAS PNL BLDV: 124 MMOL/L — LOW (ref 136–145)
GAS PNL BLDV: SIGNIFICANT CHANGE UP
GAS PNL BLDV: SIGNIFICANT CHANGE UP
GLUCOSE SERPL-MCNC: 136 MG/DL — HIGH (ref 70–99)
HCO3 BLDV-SCNC: 30 MMOL/L — HIGH (ref 22–29)
HCT VFR BLD CALC: 35.7 % — LOW (ref 39–50)
HGB BLD-MCNC: 12.1 G/DL — LOW (ref 13–17)
HOROWITZ INDEX BLDV+IHG-RTO: SIGNIFICANT CHANGE UP
IMM GRANULOCYTES NFR BLD AUTO: 0.2 % — SIGNIFICANT CHANGE UP (ref 0–0.9)
LACTATE BLDV-MCNC: 1.7 MMOL/L — SIGNIFICANT CHANGE UP (ref 0.5–2)
LIDOCAIN IGE QN: 56 U/L — SIGNIFICANT CHANGE UP (ref 13–75)
LYMPHOCYTES # BLD AUTO: 0.64 K/UL — LOW (ref 1–3.3)
LYMPHOCYTES # BLD AUTO: 7.6 % — LOW (ref 13–44)
MCHC RBC-ENTMCNC: 32 PG — SIGNIFICANT CHANGE UP (ref 27–34)
MCHC RBC-ENTMCNC: 33.9 G/DL — SIGNIFICANT CHANGE UP (ref 32–36)
MCV RBC AUTO: 94.4 FL — SIGNIFICANT CHANGE UP (ref 80–100)
MONOCYTES # BLD AUTO: 0.65 K/UL — SIGNIFICANT CHANGE UP (ref 0–0.9)
MONOCYTES NFR BLD AUTO: 7.7 % — SIGNIFICANT CHANGE UP (ref 2–14)
NEUTROPHILS # BLD AUTO: 6.33 K/UL — SIGNIFICANT CHANGE UP (ref 1.8–7.4)
NEUTROPHILS NFR BLD AUTO: 75.3 % — SIGNIFICANT CHANGE UP (ref 43–77)
NRBC # BLD: 0 /100 WBCS — SIGNIFICANT CHANGE UP (ref 0–0)
PCO2 BLDV: 40 MMHG — LOW (ref 42–55)
PH BLDV: 7.48 — HIGH (ref 7.32–7.43)
PLATELET # BLD AUTO: 209 K/UL — SIGNIFICANT CHANGE UP (ref 150–400)
PO2 BLDV: 37 MMHG — SIGNIFICANT CHANGE UP
POTASSIUM BLDV-SCNC: 7.7 MMOL/L — CRITICAL HIGH (ref 3.5–5.1)
POTASSIUM SERPL-MCNC: 4.5 MMOL/L — SIGNIFICANT CHANGE UP (ref 3.5–5.3)
POTASSIUM SERPL-SCNC: 4.5 MMOL/L — SIGNIFICANT CHANGE UP (ref 3.5–5.3)
PROT SERPL-MCNC: 8.8 G/DL — HIGH (ref 6–8.3)
RBC # BLD: 3.78 M/UL — LOW (ref 4.2–5.8)
RBC # FLD: 15.9 % — HIGH (ref 10.3–14.5)
SAO2 % BLDV: 66.9 % — SIGNIFICANT CHANGE UP
SODIUM SERPL-SCNC: 134 MMOL/L — LOW (ref 135–145)
TROPONIN I, HIGH SENSITIVITY RESULT: 18.9 NG/L — SIGNIFICANT CHANGE UP
WBC # BLD: 8.41 K/UL — SIGNIFICANT CHANGE UP (ref 3.8–10.5)
WBC # FLD AUTO: 8.41 K/UL — SIGNIFICANT CHANGE UP (ref 3.8–10.5)

## 2024-11-18 PROCEDURE — 74176 CT ABD & PELVIS W/O CONTRAST: CPT | Mod: 26,MC

## 2024-11-18 PROCEDURE — 76705 ECHO EXAM OF ABDOMEN: CPT | Mod: 26

## 2024-11-18 PROCEDURE — 99223 1ST HOSP IP/OBS HIGH 75: CPT | Mod: GC

## 2024-11-18 PROCEDURE — 99285 EMERGENCY DEPT VISIT HI MDM: CPT

## 2024-11-18 PROCEDURE — 71045 X-RAY EXAM CHEST 1 VIEW: CPT | Mod: 26

## 2024-11-18 RX ORDER — ACETAMINOPHEN 500MG 500 MG/1
1000 TABLET, COATED ORAL ONCE
Refills: 0 | Status: COMPLETED | OUTPATIENT
Start: 2024-11-18 | End: 2024-11-18

## 2024-11-18 RX ORDER — FAMOTIDINE 20 MG/1
20 TABLET, FILM COATED ORAL ONCE
Refills: 0 | Status: COMPLETED | OUTPATIENT
Start: 2024-11-18 | End: 2024-11-18

## 2024-11-18 RX ORDER — ONDANSETRON HYDROCHLORIDE 4 MG/1
4 TABLET, FILM COATED ORAL ONCE
Refills: 0 | Status: COMPLETED | OUTPATIENT
Start: 2024-11-18 | End: 2024-11-18

## 2024-11-18 RX ORDER — ONDANSETRON HYDROCHLORIDE 4 MG/1
4 TABLET, FILM COATED ORAL EVERY 8 HOURS
Refills: 0 | Status: DISCONTINUED | OUTPATIENT
Start: 2024-11-18 | End: 2024-11-19

## 2024-11-18 RX ORDER — MAGNESIUM, ALUMINUM HYDROXIDE 200-225/5
30 SUSPENSION, ORAL (FINAL DOSE FORM) ORAL EVERY 4 HOURS
Refills: 0 | Status: DISCONTINUED | OUTPATIENT
Start: 2024-11-18 | End: 2024-11-19

## 2024-11-18 RX ORDER — TRAZODONE HYDROCHLORIDE 150 MG/1
100 TABLET ORAL AT BEDTIME
Refills: 0 | Status: DISCONTINUED | OUTPATIENT
Start: 2024-11-18 | End: 2024-11-25

## 2024-11-18 RX ORDER — HEPARIN SODIUM,PORCINE 1000/ML
5000 VIAL (ML) INJECTION EVERY 8 HOURS
Refills: 0 | Status: DISCONTINUED | OUTPATIENT
Start: 2024-11-18 | End: 2024-11-25

## 2024-11-18 RX ORDER — NIFEDIPINE 10 MG
90 CAPSULE ORAL DAILY
Refills: 0 | Status: DISCONTINUED | OUTPATIENT
Start: 2024-11-18 | End: 2024-11-25

## 2024-11-18 RX ORDER — ACETAMINOPHEN 500MG 500 MG/1
650 TABLET, COATED ORAL EVERY 6 HOURS
Refills: 0 | Status: DISCONTINUED | OUTPATIENT
Start: 2024-11-18 | End: 2024-11-25

## 2024-11-18 RX ORDER — CEFTRIAXONE SODIUM 1 G
2000 VIAL (EA) INJECTION ONCE
Refills: 0 | Status: COMPLETED | OUTPATIENT
Start: 2024-11-18 | End: 2024-11-18

## 2024-11-18 RX ORDER — CARVEDILOL 25 MG/1
12.5 TABLET, FILM COATED ORAL EVERY 12 HOURS
Refills: 0 | Status: DISCONTINUED | OUTPATIENT
Start: 2024-11-18 | End: 2024-11-25

## 2024-11-18 RX ORDER — ACETAMINOPHEN, DIPHENHYDRAMINE HCL, PHENYLEPHRINE HCL 325; 25; 5 MG/1; MG/1; MG/1
3 TABLET ORAL AT BEDTIME
Refills: 0 | Status: DISCONTINUED | OUTPATIENT
Start: 2024-11-18 | End: 2024-11-19

## 2024-11-18 RX ADMIN — ACETAMINOPHEN 500MG 400 MILLIGRAM(S): 500 TABLET, COATED ORAL at 19:05

## 2024-11-18 RX ADMIN — Medication 4 MILLIGRAM(S): at 21:58

## 2024-11-18 RX ADMIN — FAMOTIDINE 20 MILLIGRAM(S): 20 TABLET, FILM COATED ORAL at 18:53

## 2024-11-18 RX ADMIN — ONDANSETRON HYDROCHLORIDE 4 MILLIGRAM(S): 4 TABLET, FILM COATED ORAL at 18:53

## 2024-11-18 NOTE — H&P ADULT - NSHPPHYSICALEXAM_GEN_ALL_CORE
PHYSICAL EXAM:  GENERAL: in mild distress due to pain, speaks in full sentences, no signs of respiratory distress  HEAD:  Atraumatic, Normocephalic  EYES: EOMI, PERRLA, conjunctiva and sclera clear  NECK: Supple, No JVD  CHEST/LUNG: Clear to auscultation bilaterally; No wheeze; No crackles; No accessory muscles used  HEART: Regular rate and rhythm; No murmurs;   ABDOMEN: Soft, + epigastric tenderness, + mild distended,; Bowel sounds present; No guarding  EXTREMITIES:  2+ Peripheral Pulses, No cyanosis or edema + bruit over LUE AVF   PSYCH: AAOx3  NEUROLOGY: non-focal  SKIN: No rashes or lesions

## 2024-11-18 NOTE — ED PROVIDER NOTE - PHYSICAL EXAMINATION
Constitutional: In acute distress secondary to pain and actively vomiting.   Head: Normocephalic, atraumatic.   Eyes: PERRL. EOMI. No conjunctival pallor.   ENT: Moist mucous membranes. Uvula midline. No pharyngeal erythema or exudates.  Neck: No LAD. Supple.  CVS: Regular rate, regular rhythm. Normal S1, S2. No murmurs, rubs, or gallops.2+ pitting edema bilaterally.   Respiratory: No respiratory distress. Clear to auscultation bilaterally. No wheezing, rales, or rhonchi.   Abdomen: Abd is soft and distended. epigastric, RUQ, and LLQ tenderness. No rebound, guarding, or rigidity.   MSK: No CVA tenderness bilaterally.   Neuro: Alert and oriented to person, place, and time. Follows commands. Moves all extremities.   Skin: Warm and dry. No rashes.   Psych: Normal affect, cooperative.

## 2024-11-18 NOTE — H&P ADULT - ASSESSMENT
Being admitted to medicine for intractable abdominal pain, nausea 54Y/ M with PMHx of HTN, HLD, DM (not on medications currently), CHF, ESRD on HD ( L UE AVF)  MWF( follows with Dr. Griffiths) , ascites( follows with Dr. Li at [facility])  last drained 10/10/2024 presented  to the with intractable abdominal pain associated with nausea, bilious emesis x 4 . Patient states he states tolerated his HD session well after which he began experiencing 10/10 epigastric abdominal pain with bilious emesis x 4 ( last .episode .witnessed iun ER) . Patient repports improvemnt in pain after vomiting.  He denies chest pain, shortness of breath, cough, fever, chills, diarrhea, dysuria, hematuria, flank pain, and any additional complaints at this time.  No recent travel or sick contacts.        In the ER   VS   /80, RR18, HR 56, afebrile, sats 99% RA   s/p zofran, tylenol, pepcid , morphine   Labs s/o Hb 12.1, Na 134, BUn / Cr 31/ 5.13  CTAP  Moderate to large volume ascites, and generalized anasarca.    Trace pericardial effusion.    Apparent wall thickening of the urinary bladder, likely related to its   underdistended nature. Cystitis may appear similar. Correlation with   urinalysis.    Small hiatal hernia.  US abd  Atrophic right kidney.    Moderate abdominal ascites      Being admitted to medicine for symptomatic treatment of  intractable abdominal pain, nausea, now resolving, on clear liquids  54Y/ M with PMHx of HTN, HLD, DM (not on medications currently), CHF, ESRD on HD ( L UE AVF)  MWF( follows with Dr. Griffiths) , ascites( follows with Dr. Li at [facility])  last drained 10/10/2024 presented  to the with intractable abdominal pain associated with nausea, bilious emesis x 4 . Patient states he states tolerated his HD session well after which he began experiencing 10/10 epigastric abdominal pain with bilious emesis x 4 ( last .episode .witnessed iun ER) . Patient repports improvemnt in pain after vomiting.  He denies chest pain, shortness of breath, cough, fever, chills, diarrhea, dysuria, hematuria, flank pain, and any additional complaints at this time.  No recent travel or sick contacts.        In the ER   VS   /80, RR18, HR 56, afebrile, sats 99% RA   s/p zofran, tylenol, pepcid , morphine   Labs s/o Hb 12.1, Na 134, BUn / Cr 31/ 5.13  CTAP  Moderate to large volume ascites, and generalized anasarca.    Trace pericardial effusion.    Apparent wall thickening of the urinary bladder, likely related to its   underdistended nature. Cystitis may appear similar. Correlation with   urinalysis.    Small hiatal hernia.  US abd  Atrophic right kidney.    Moderate abdominal ascites      Being admitted to medicine for symptomatic treatment of  intractable abdominal pain, nausea, now resolving, on clear liquids   s/p CTX for SBP PPx  54Y/ M with PMHx of HTN, HLD, DM (not on medications currently), CHF, ESRD on HD ( L UE AVF)  MWF( follows with Dr. Griffiths) , ascites( follows with Dr. Li at [facility])  last drained 10/10/2024 presented  to the with intractable abdominal pain associated with nausea, bilious emesis x 4 . In the ER, VSS, s/p  zofran, tylenol, pepcid , morphine . Labs s/o Hb 12.1, Na 134, BUn / Cr 31/ 5.13. CTAP c/w large volume ascites     Being admitted to medicine for symptomatic treatment of  intractable abdominal pain, nausea, now resolving, on clear liquids   s/p CTX for SBP PPx

## 2024-11-18 NOTE — ED ADULT TRIAGE NOTE - CHIEF COMPLAINT QUOTE
C/o generalized abdominal pain with nausea and vomiting x today. Dialysis days M,W,F, last dialysis treatment today.

## 2024-11-18 NOTE — ED PROVIDER NOTE - PRIOR EKG STATUS
the EKG is unchanged from prior EKG [facility] Counseling:  I discussed with the patient the risks of golimumab including but not limited to myelosuppression, immunosuppression, autoimmune hepatitis, demyelinating diseases, lymphoma, and serious infections.  The patient understands that monitoring is required including a PPD at baseline and must alert us or the primary physician if symptoms of infection or other concerning signs are noted.

## 2024-11-18 NOTE — H&P ADULT - PROBLEM SELECTOR PLAN 4
EKG shows sinus james EKG shows sinus james with 1st degree AVB     as per prior EKG patient has 1 st degree AVB EKG shows sinus james with 1st degree AVB     as per prior EKG patient has Hx of  1 st degree AVB    currently asymptomatic EKG shows sinus james with 1st degree AVB     as per prior EKG patient has Hx of  1 st degree AVB    currently asymptomatic  c/w monitoring

## 2024-11-18 NOTE — H&P ADULT - HISTORY OF PRESENT ILLNESS
54Y/ M with PMHx of HTN, HLD, DM (not on medications currently), CHF, ESRD on HD ( L UE AVF)  MWF( follows with Dr. Griffiths) , ascites( follows with Dr. Li at [facility])  last drained 10/10/2024 presented  to the with intractable abdominal pain associated with nausea, bilious emesis x 4 . Patient states he states tolerated his HD session well after which he began experiencing 10/10 epigastric abdominal pain with bilious emesis x 4 ( last .episode .witnessed iun ER) . Patient repports improvement in pain after vomiting.  He denies chest pain, shortness of breath, cough, fever, chills, diarrhea, dysuria, hematuria, flank pain, and any additional complaints at this time.  No recent travel or sick contacts.        In the ER   VS   /80, RR18, HR 56, afebrile, sats 99% RA   s/p zofran, tylenol, pepcid , morphine   Labs s/o Hb 12.1, Na 134, BUn / Cr 31/ 5.13  CTAP  Moderate to large volume ascites, and generalized anasarca.    Trace pericardial effusion.    Apparent wall thickening of the urinary bladder, likely related to its   underdistended nature. Cystitis may appear similar. Correlation with   urinalysis.    Small hiatal hernia.  US abd  Atrophic right kidney.    Moderate abdominal ascites   54Y/ M with PMHx of HTN, HLD, DM (not on medications currently), CHF, ESRD on HD ( L UE AVF)  MWF( follows with Dr. Griffiths) , ascites( follows with Dr. Li at [facility])  last drained 10/10/2024 presented  to the with intractable abdominal pain associated with nausea, bilious emesis x 4 . Patient states he states tolerated his HD session well after which he began experiencing 10/10 epigastric abdominal pain with bilious emesis x 4 ( last .episode .witnessed iun ER) . Patient repports improvement in pain after vomiting.  He denies chest pain, shortness of breath, cough, fever, chills, diarrhea, dysuria, hematuria, flank pain, and any additional complaints at this time.  No recent travel or sick contacts.        In the ER   VS   /80, RR18, HR 56, afebrile, sats 99% RA   s/p zofran, tylenol, pepcid , morphine   Labs s/o Hb 12.1, Na 134, BUn / Cr 31/ 5.13    CTAP  Moderate to large volume ascites, and generalized anasarca.    Trace pericardial effusion.    Apparent wall thickening of the urinary bladder, likely related to its   underdistended nature. Cystitis may appear similar. Correlation with   urinalysis.    Small hiatal hernia.    US abd  Atrophic right kidney.    Moderate abdominal ascites   54Y/ M with PMHx of HTN, HLD, DM (not on medications currently), CHF, ESRD on HD ( L UE AVF)  MWF( follows with Dr. Griffiths) , ascites( follows with Dr. Li at [facility])  last drained 10/10/2024 presented  to the with intractable abdominal pain associated with nausea, bilious emesis x 4 . Patient states he states tolerated his HD session well after which he began experiencing 10/10 epigastric abdominal pain with bilious emesis x 4 ( last .episode .witnessed iun ER) . Patient repports improvement in pain after vomiting.  He denies chest pain, shortness of breath, cough, fever, chills, diarrhea, dysuria, hematuria, flank pain, and any additional complaints at this time.  No recent travel or sick contacts.        In the ER   VS   /80, RR18, HR 56, afebrile, sats 99% RA   s/p zofran, tylenol, pepcid , morphine   Labs s/o Hb 12.1, Na 134, BUn / Cr 31/ 5.13    CTAP c/w mod- large ascites , anasarca, Trace pericardial effusion, ? cystitis, Small hiatal hernia.    US abd- no acute finding

## 2024-11-18 NOTE — H&P ADULT - PROBLEM SELECTOR PLAN 3
ESRD on HD  MWF   Follows with Dr. Griffiths   Last dialysis 11/18- tolerated well, 2.5  removed     -Nephro Dr. Griffiths consulted   -renal diet  -fluid restriction

## 2024-11-18 NOTE — H&P ADULT - ATTENDING COMMENTS
# Intractable Abdominal Pain  # Nausea and Vomiting  > CT A/P with moderate to large ascites  > Prior notes document patient with cirrhosis; patient denies cirrhosis but states he used to drink a lot when he was younger; RUQ Abdominal U/S shows liver is WNL; MELD-Na score is 25 points  > Patient's next paracentesis was supposed to be 11/26/24  - IV Ceftriaxone 2gm for SBP PPx given prior documents saying patient has cirrhosis and elevated MELD-Na score  - Can consult IR for possible paracentesis  - PPI  - Maalox  - Zofran prn  - IV Fluid Boluses prn  - Tylenol prn for mild pain, IV Dilaudid 0.2mg prn for moderate to severe pain    # Airway impaction on CT  - Incentive Spirometer    # Hx of DM  > Blood Glucose of 120 in the ER  - Insulin Sliding Scale  - Blood Glucose Monitoring  - Can monitor blood glucose for 24 hours before starting long acting insulin if needed    # Hx of HTN, HLD, CHF, ESRD  - Continue home medications Lisinopril, Carvedilol, Nifedipine, Statin  - Can consult Nephrology for dialysis    # DVT PPx  - Heparin    # FEN  - Clear Liquid Diet  - Monitor and replete electrolytes as needed  - Avoid standing fluids due to ESRD # Intractable Abdominal Pain  # Nausea and Vomiting  > CT A/P with moderate to large ascites  > Prior notes document patient with cirrhosis; patient denies cirrhosis but states he used to drink a lot when he was younger; RUQ Abdominal U/S shows liver is WNL; MELD-Na score is 25 points  > Patient's next paracentesis was supposed to be 11/26/24  - IV Ceftriaxone 2gm for SBP PPx given prior documents saying patient has cirrhosis and elevated MELD-Na score  - Can consult IR for possible paracentesis  - PPI  - Maalox  - Zofran prn  - IV Fluid Boluses prn  - Tylenol prn for mild pain, IV Dilaudid 0.2mg prn for moderate to severe pain    # Airway impaction on CT  - Incentive Spirometer    # Hx of DM  > Blood Glucose of 120 in the ER; diet controlled only; not on home diabetic medications  - Diabetic Diet    # Hx of HTN, HLD, CHF, ESRD  - Continue home medications Lisinopril, Carvedilol, Nifedipine, Statin  - Can consult Nephrology for dialysis    # DVT PPx  - Heparin    # FEN  - Diabetic Clear Liquid Diet  - Monitor and replete electrolytes as needed  - Avoid standing fluids due to ESRD RUQ Abdominal U/S  FINDINGS:  Liver: Within normal limits.  IMPRESSION:  Atrophic right kidney.  Moderate abdominal ascites    Non-Contrast CT A/P  PERITONEUM/RETROPERITONEUM: Moderate to large volume ascites. Extra peritoneal fat is seen herniating into the left lateral canal.  IMPRESSION:  Moderate to large volume ascites, and generalized anasarca.  Trace pericardial effusion.  Apparent wall thickening of the urinary bladder, likely related to its underdistended nature. Cystitis may appear similar. Correlation with urinalysis.  Small hiatal hernia.    Chest X-Ray: IMPRESSION: No acute cardiopulmonary disease process. Cardiomegaly.    EKG: Sinus Bradycardia, 58bpm, QTc 496ms, MA 228ms, on my read no ST segment elevation or depression, no TWI    54 year old male patient with pmhx HTN, HLD, CHF, ESRD on HD (MWF) still makes a little urine who presented to the ER due to abdominal pain, nausea, and vomiting.  The patient completed a 4 hour dialysis session and afterwards had severe abdominal pain with nausea and vomiting. He denies eating anything bad or spoiled recently; he had only eaten a protein bar in the morning. He has ascites for the past 2 years and used to get a paracentesis every 4 weeks, but now only gets one every 6 weeks. His last paracentesis was 10/10/24 and his next one is for 11/26/24. In the ER CT A/P positive only for moderate to large ascites. Patient denies history of cirrhosis; although noted that prior admission notes document patient as having decompensated cirrhosis. Patient states he used to drink a lot of alcohol when he was younger, but does not drink any alcohol anymore. RUQ Abdominal U/S shows liver is within normal limits.  Review Of Systems included: + abdominal pain, + nausea, + vomiting, + loss of appetite, + chills, no fever, no chest pain, no shortness of breath, no dysuria, no diarrhea, no constipation, no sore throat, no cough, no recent eating of bad/spoiled food, no rashes or wounds    General: Awake, Alert, Oriented  Cardiac: RRR  Pulmonary: CTA b/l  Abdominal: Soft, Distended, + epigastric tenderness  Extremities: 2+ edema b/l    # Intractable Abdominal Pain  # Nausea and Vomiting  > CT A/P with moderate to large ascites  > Prior notes document patient with cirrhosis; patient denies cirrhosis but states he used to drink a lot when he was younger; RUQ Abdominal U/S shows liver is WNL; MELD-Na score is 25 points  > Patient's next paracentesis was supposed to be 11/26/24  - IV Ceftriaxone 2gm for SBP PPx given prior documents saying patient has cirrhosis and elevated MELD-Na score  - Can consult IR for possible paracentesis  - PPI  - Maalox  - Zofran prn  - IV Fluid Boluses prn  - Tylenol prn for mild pain, IV Dilaudid 0.2mg prn for moderate to severe pain    # Airway impaction on CT  - Incentive Spirometer    # Hx of DM  > Blood Glucose of 120 in the ER  - Insulin Sliding Scale  - Blood Glucose Monitoring  - Can monitor blood glucose for 24 hours before starting long acting insulin if needed    # Hx of HTN, HLD, CHF, ESRD on HD (MWF) still makes a little urine  - Continue home medications Lisinopril, Carvedilol, Nifedipine, Statin  - Can consult Nephrology for dialysis    # DVT PPx: Heparin    # FEN  - Clear Liquid Diet  - Monitor and replete electrolytes as needed  - Avoid standing fluids due to ESRD    Previous Admissions Included  11/2/2023 - 11/6/2023: CHF Exacerbation. Elevated troponin of 2,870 due to demand ischemia, PNA. echo in 11/04/23 showed grade 3-4 diastolic dysfunction, EF 35-40%  10/2/2023 - 10/3/2023: Hyperkalemia, Junctional Bradycardia. Pt also has LVEF <35% and is aware he should talk to his cardiology team at [facility] about an S-ICD, which is appropriate given his young age and hemodialysis. Per EP, at this time he does not require ventricular pacing or cardiac resynchronization therapy, and he can be discharged home with close followup w/ his [facility] cardiology team.  11/29/2022: ER Visit for umbilical hernia  12/14/2021: ER Visit for Vasovagal Syncope, Traumatic subdural hemorrhage, Laceration of scalp, Contusion of head  2/25/2020: s/p AV fistula creation    Patient case and management was discussed with ER Attending. I did examine all labs (including CBC, PT/INR, CMP, Lipase, Troponin, VBG), imaging, prior notes

## 2024-11-18 NOTE — ED ADULT NURSE NOTE - CAS EDN DISCHARGE ASSESSMENT
Price (Use Numbers Only, No Special Characters Or $): 200.00 Consent: The patient's consent was obtained including but not limited to risks of crusting, scabbing, blistering, scarring, darker or lighter pigmentary change, recurrence, incomplete removal and infection. The patient understands that the procedure is cosmetic in nature and is not covered by insurance. Detail Level: Detailed Post-Care Instructions: I reviewed with the patient in detail post-care instructions. Patient is to wear sunprotection, and avoid picking at any of the treated lesions. Pt may apply Vaseline to crusted or scabbing areas. Render Post-Care Instructions In Note?: no Alert and oriented to person, place and time

## 2024-11-18 NOTE — H&P ADULT - PROBLEM SELECTOR PLAN 1
s/p zofran, tylenol, pepcid     Patient reports mild improvement in pain   Patient is hungry, requesting trial of liquids    clear liquids - advance as tolerated   c/w tylenol for mild , dilaudid for severe pain   c/w pepcid s/p zofran, tylenol, pepcid , morphine     Patient reports mild improvement in pain   Patient is hungry, requesting trial of liquids    clear liquids - advance as tolerated   c/w tylenol for mild , dilaudid for severe pain PRN  c/w pepcid, maalox PRN s/p zofran, tylenol, pepcid , morphine     Patient reports mild improvement in pain   Patient is hungry, requesting trial of liquids    clear liquids - advance as tolerated   c/w tylenol for mild , dilaudid for severe pain PRN  c/w protonix, maalox PRN

## 2024-11-18 NOTE — ED PROVIDER NOTE - CARE PLAN
Principal Discharge DX:	Abdominal pain   1 Principal Discharge DX:	Abdominal pain  Secondary Diagnosis:	Intractable abdominal pain

## 2024-11-18 NOTE — H&P ADULT - NSHPREVIEWOFSYSTEMS_GEN_ALL_CORE
- CONSTITUTIONAL: Denies fever and  + chills  - HEENT: Denies changes in vision and hearing.  - RESPIRATORY: Denies SOB and cough.  - CV: Denies chest pain and palpitations  - GI: + epigastric abdominal pain, nausea, vomiting and denies diarrhea.  - : Denies dysuria and urinary frequency.  - SKIN: Denies rash and pruritus.  - NEUROLOGICAL: Denies headache and syncope.  - PSYCHIATRIC: Denies recent changes in mood. Denies anxiety and depression.

## 2024-11-18 NOTE — ED ADULT NURSE NOTE - CADM POA URETHRAL CATHETER
Continuity of Care Form    Patient Name: Jaimie Augustin   :  1950  MRN:  [id number]    Admit date:  2022  Discharge date:  ***    Code Status Order: Full Code   Advance Directives:      Admitting Physician:  Brett Mcguire MD  PCP: Martha Tim MD    Discharging Nurse: [facility] Unit/Room#: 5893/2042-25  Discharging Unit Phone Number: ***    Emergency Contact:   Extended Emergency Contact Information  Primary Emergency Contact: [facility]  Address: [address] [facility], [address] Phone: [phone]  Mobile Phone: [phone]  Relation: Other  Secondary Emergency Contact: Anabell Chavarria  Address: [facility] [facility], [address]  Home Phone: [phone]  Relation: Spouse    Past Surgical History:  Past Surgical History:   Procedure Laterality Date    BACK SURGERY      BREAST SURGERY      COLONOSCOPY      [address]      fused right heel and right leg lengthened-due to polio    TONSILLECTOMY         Immunization History:   Immunization History   Administered Date(s) Administered    COVID-19, Philippe Turcios, Primary or Immunocompromised, PF, 100mcg/0.5mL 2021, 2021    COVID-19, Pfizer Purple top, DILUTE for use, 12+ yrs, 30mcg/0.3mL dose 2021    Influenza Virus Vaccine 10/01/2009, 2013, 10/02/2014    Pneumococcal Conjugate 13-valent ([id number]) 2015    Pneumococcal Polysaccharide (Pacpjhtrw06) 10/30/2012    Zoster Live (Zostavax) 12/15/2011       Active Problems:  Patient Active Problem List   Diagnosis Code    HTN (hypertension) I10    Hypertriglyceridemia E78.1    DDD (degenerative disc disease), cervical M50.30    Menopause Z78.0    Debility R53.81    Non-intractable vomiting with nausea R11.2    Status post lumbar spine surgery for decompression of spinal cord Z98.890       Isolation/Infection:   Isolation            No Isolation          Patient Infection Status       None to display            Nurse Assessment:  Last Vital Signs: /75   Pulse 77   Temp 97.7 °F (36.5 °C)   Resp 19   Ht 5' 4\" (1.626 m)   Wt 180 lb 3.2 oz (81.7 kg)   SpO2 97%   BMI 30.93 kg/m²     Last documented pain score (0-10 scale): Pain Level: 6  Last Weight:   Wt Readings from Last 1 Encounters:   22 180 lb 3.2 oz (81.7 kg)     Mental Status:  {IP PT MENTAL STATUS:}    IV Access:  { GUILLERMO IV ACCESS:191941494}    Nursing Mobility/ADLs:  Walking   {P DME LXTO:538492726}  Transfer  {P DME CPCH:960935778}  Bathing  {CHP DME QYZB:520519336}  Dressing  {CHP DME CJQX:449926445}  Toileting  {P DME GFJD:177448401}  Feeding  {[facility] DME WKC}  Med Admin  {[facility] DME LGW}  Med Delivery   { GUILLERMO MED Delivery:210434863}    Wound Care Documentation and Therapy:  Wound 22 Back Lower;Medial closed with sutures, no drainage, no odor. (Active)   Wound Etiology Surgical 22 2100   Dressing Status New dressing applied;Clean;Dry; Intact 22 1200   Wound Cleansed Soap and water 22 0948   Dressing/Treatment Open to air 22 2100   Dressing Change Due 22 2110   Wound Assessment Dry 22 2100   Drainage Amount None 22 2100   Odor None 22 2100   Jennifer-wound Assessment Dry/flaky; Intact 22 2100   Margins Attached edges 22 2100   Number of days: 9        Elimination:  Continence: Bowel: {YES / FC:16575}  Bladder: {YES / IP:36826}  Urinary Catheter: {Urinary Catheter:277999584}   Colostomy/Ileostomy/Ileal Conduit: {YES / IM:43183}       Date of Last BM: ***    Intake/Output Summary (Last 24 hours) at 5/3/2022 1338  Last data filed at 2022 1900  Gross per 24 hour   Intake 480 ml   Output --   Net 480 ml     I/O last 3 completed shifts:   In: 480 [P.O.:480]  Out: -     Safety Concerns:     [address] Safety Concerns:487699625}    Impairments/Disabilities:      [address] Impairments/Disabilities:257289793}    Nutrition Therapy:  Current Nutrition Therapy:   [address] GUILLERMO Diet List:715713066}    Routes of Feeding: {CHP DME Other Feedings:825832600}  Liquids: {Slp liquid thickness:68677}  Daily Fluid Restriction: {CHP DME Yes amt example:791159920}  Last Modified Barium Swallow with Video (Video Swallowing Test): {Done Not Done HZDD:127086425}    Treatments at the Time of Hospital Discharge:   Respiratory Treatments: ***  Oxygen Therapy:  {Therapy; copd oxygen:97428}  Ventilator:    { CC Vent LNEX:599268410}    Rehab Therapies: {THERAPEUTIC INTERVENTION:7290999490}  Weight Bearing Status/Restrictions: { CC Weight Bearin}  Other Medical Equipment (for information only, NOT a DME order):  {EQUIPMENT:754969232}  Other Treatments: ***    Patient's personal belongings (please select all that are sent with patient):  {[facility] DME Belongings:003536235}    RN SIGNATURE:  {Esignature:449390929}    CASE MANAGEMENT/SOCIAL WORK SECTION    Inpatient Status Date: ***    Readmission Risk Assessment Score:  Readmission Risk              Risk of Unplanned Readmission:  10           Discharging to Facility/ Agency   Name:   Address:  Phone:  Fax:    Dialysis Facility (if applicable)   Name:  Address:  Dialysis Schedule:  Phone:  Fax:    / signature: {Esignature:442908395}    PHYSICIAN SECTION    Prognosis: {Prognosis:2242478505}    Condition at Discharge: [address]levins Patient Condition:765116623}    Rehab Potential (if transferring to Rehab): {Prognosis:3922687055}    Recommended Labs or Other Treatments After Discharge: ***    Physician Certification: I certify the above information and transfer of Tracey Forbes  is necessary for the continuing treatment of the diagnosis listed and that she requires {Admit to Appropriate Level of Care:68995} for {GREATER/LESS:755844985} 30 days.      Update Admission H&P: [facility]349[facility]}    PHYSICIAN SIGNATURE: {[facility]:401876258} No

## 2024-11-18 NOTE — H&P ADULT - PROBLEM SELECTOR PLAN 6
denies SOB  TTE 11/23- EF 35-40 %, G3-4 DD  CXR wet read- cardiomegaly     takes lisinopril 40 mg nifedipine 90 mg OD, carvedilol 12.5 mg BID    -c/w home meds    -daily weights, strict I&O

## 2024-11-18 NOTE — H&P ADULT - PROBLEM SELECTOR PLAN 9
h/o DM not on any home meds  will  hold oral dm meds while inpatient   f/u A1c  c/w lantus + lispro + sliding scale  Adjust insulin as indicated  FS ACHS q6 while NPO h/o DM not on any home meds for many years   controlled by diet   will hold off on any IP treatment and monitor CMP daily

## 2024-11-18 NOTE — H&P ADULT - PROBLEM SELECTOR PLAN 7
h/o HTN on lisinopril 40 mg nifedipine 90 mg OD, carvedilol 12.5 mg BID  Monitor BP  c/w home meds  with holding parameters   Lower MAP 20-25% in next 2-4 hrs

## 2024-11-18 NOTE — ED PROVIDER NOTE - CLINICAL SUMMARY MEDICAL DECISION MAKING FREE TEXT BOX
54-year-old male with past medical history of HTN, HLD, DM (not on medications currently), CHF, ESRD on HD MWF, ascites last drained 10/10/2024 presenting to the ED for evaluation of severe abdominal pain with bilious vomiting since earlier this afternoon.  As per patient, he states that he went to dialysis as planned and completed a full 4-hour session without difficulty.  Shortly after he began experiencing severe abdominal pain with several episodes of bilious emesis and sent to the ED for further evaluation.  Patient reports that after vomiting his pain improves.  He has never experienced Imler symptoms in the past.  He denies chest pain, shortness of breath, cough, fever, chills, diarrhea, dysuria, hematuria, flank pain, and any additional complaints at this time.  No recent travel or sick contacts.    On arrival to the ED, the patient is hypertensive, bradycardic, afebrile, and satting 99% on room air.  Patient's abdomen is tender in the right upper quadrant, left lower quadrant and epigastric region.  Lungs are clear to auscultation bilaterally.  Plan for CTA labs, EKG, symptomatic management, and reevaluate. 54-year-old male with past medical history of HTN, HLD, DM (not on medications currently), CHF, ESRD on HD MWF, ascites last drained 10/10/2024 presenting to the ED for evaluation of severe abdominal pain with bilious vomiting since earlier this afternoon.  As per patient, he states that he went to dialysis as planned and completed a full 4-hour session without difficulty.  Shortly after he began experiencing severe abdominal pain with several episodes of bilious emesis and sent to the ED for further evaluation.  Patient reports that after vomiting his pain improves.  He has never experienced similar symptoms in the past.  He denies chest pain, shortness of breath, cough, fever, chills, diarrhea, dysuria, hematuria, flank pain, and any additional complaints at this time.  No recent travel or sick contacts.    On arrival to the ED, the patient is hypertensive, bradycardic, afebrile, and satting 99% on room air.  Patient's abdomen is tender in the right upper quadrant, left lower quadrant and epigastric region.  Lungs are clear to auscultation bilaterally.  Plan for CTA labs, EKG, symptomatic management, and reevaluate.

## 2024-11-18 NOTE — ED PROVIDER NOTE - PROGRESS NOTE DETAILS
Discussed case with Dr. Man, Hospitalist, who accepts this patient for admission. Still experiencing severe abdominal pain. Plan for admission.

## 2024-11-18 NOTE — H&P ADULT - PROBLEM SELECTOR PLAN 5
As per chart review - Hx of cirrhosis   follows with Dr. Li at [facility] radiology  for regular  paracentesis   lasts session 11/10, next scheduled for 11/26 As per chart review - Hx of cirrhosis   follows with Dr. Li at [facility] radiology  for regular  paracentesis   lasts session 11/10, next scheduled for 11/26  s/p CTX 2 g IV for SBP PPx   [ ] consider hepatology consult in AM No As per chart review - Hx of cirrhosis   follows with Dr. Li at [facility] radiology  for regular  paracentesis   lasts session 11/10, next scheduled for 11/26  patient started to have chills   s/p CTX 2 g IV for SBP PPx     [ ] consider hepatology consult in AM

## 2024-11-19 ENCOUNTER — TRANSCRIPTION ENCOUNTER (OUTPATIENT)
Age: 54
End: 2024-11-19

## 2024-11-19 ENCOUNTER — RESULT REVIEW (OUTPATIENT)
Age: 54
End: 2024-11-19

## 2024-11-19 DIAGNOSIS — Z75.8 OTHER PROBLEMS RELATED TO MEDICAL FACILITIES AND OTHER HEALTH CARE: ICD-10-CM

## 2024-11-19 LAB
ALBUMIN SERPL ELPH-MCNC: 3.3 G/DL — LOW (ref 3.5–5)
ALBUMIN SERPL ELPH-MCNC: 3.6 G/DL — SIGNIFICANT CHANGE UP (ref 3.5–5)
ALP SERPL-CCNC: 80 U/L — SIGNIFICANT CHANGE UP (ref 40–120)
ALP SERPL-CCNC: 86 U/L — SIGNIFICANT CHANGE UP (ref 40–120)
ALT FLD-CCNC: 16 U/L DA — SIGNIFICANT CHANGE UP (ref 10–60)
ALT FLD-CCNC: 19 U/L DA — SIGNIFICANT CHANGE UP (ref 10–60)
ANION GAP SERPL CALC-SCNC: 4 MMOL/L — LOW (ref 5–17)
ANION GAP SERPL CALC-SCNC: 6 MMOL/L — SIGNIFICANT CHANGE UP (ref 5–17)
APTT BLD: 33.1 SEC — SIGNIFICANT CHANGE UP (ref 24.5–35.6)
AST SERPL-CCNC: 18 U/L — SIGNIFICANT CHANGE UP (ref 10–40)
AST SERPL-CCNC: 40 U/L — SIGNIFICANT CHANGE UP (ref 10–40)
B PERT IGG+IGM PNL SER: ABNORMAL
BASOPHILS # BLD AUTO: 0 K/UL — SIGNIFICANT CHANGE UP (ref 0–0.2)
BASOPHILS NFR BLD AUTO: 0 % — SIGNIFICANT CHANGE UP (ref 0–2)
BILIRUB SERPL-MCNC: 0.6 MG/DL — SIGNIFICANT CHANGE UP (ref 0.2–1.2)
BILIRUB SERPL-MCNC: 0.7 MG/DL — SIGNIFICANT CHANGE UP (ref 0.2–1.2)
BUN SERPL-MCNC: 45 MG/DL — HIGH (ref 7–18)
BUN SERPL-MCNC: 50 MG/DL — HIGH (ref 7–18)
CALCIUM SERPL-MCNC: 8.7 MG/DL — SIGNIFICANT CHANGE UP (ref 8.4–10.5)
CALCIUM SERPL-MCNC: 9.4 MG/DL — SIGNIFICANT CHANGE UP (ref 8.4–10.5)
CHLORIDE SERPL-SCNC: 102 MMOL/L — SIGNIFICANT CHANGE UP (ref 96–108)
CHLORIDE SERPL-SCNC: 102 MMOL/L — SIGNIFICANT CHANGE UP (ref 96–108)
CO2 SERPL-SCNC: 25 MMOL/L — SIGNIFICANT CHANGE UP (ref 22–31)
CO2 SERPL-SCNC: 27 MMOL/L — SIGNIFICANT CHANGE UP (ref 22–31)
COLOR FLD: YELLOW — SIGNIFICANT CHANGE UP
COMMENT - FLUIDS: SIGNIFICANT CHANGE UP
CREAT SERPL-MCNC: 5.35 MG/DL — HIGH (ref 0.5–1.3)
CREAT SERPL-MCNC: 5.96 MG/DL — HIGH (ref 0.5–1.3)
EGFR: 10 ML/MIN/1.73M2 — LOW
EGFR: 12 ML/MIN/1.73M2 — LOW
EOSINOPHIL # BLD AUTO: 0.42 K/UL — SIGNIFICANT CHANGE UP (ref 0–0.5)
EOSINOPHIL NFR BLD AUTO: 3 % — SIGNIFICANT CHANGE UP (ref 0–6)
FLUID INTAKE SUBSTANCE CLASS: SIGNIFICANT CHANGE UP
GLUCOSE SERPL-MCNC: 120 MG/DL — HIGH (ref 70–99)
GLUCOSE SERPL-MCNC: 141 MG/DL — HIGH (ref 70–99)
HCT VFR BLD CALC: 33.2 % — LOW (ref 39–50)
HCT VFR BLD CALC: 35.5 % — LOW (ref 39–50)
HGB BLD-MCNC: 11.2 G/DL — LOW (ref 13–17)
HGB BLD-MCNC: 11.8 G/DL — LOW (ref 13–17)
HYPOCHROMIA BLD QL: SLIGHT — SIGNIFICANT CHANGE UP
HYPOSEGMENTATION: PRESENT — SIGNIFICANT CHANGE UP
INR BLD: 1.18 RATIO — HIGH (ref 0.85–1.16)
LYMPHOCYTES # BLD AUTO: 0.42 K/UL — LOW (ref 1–3.3)
LYMPHOCYTES # BLD AUTO: 3 % — LOW (ref 13–44)
LYMPHOCYTES # FLD: 4 % — SIGNIFICANT CHANGE UP
MAGNESIUM SERPL-MCNC: 2.1 MG/DL — SIGNIFICANT CHANGE UP (ref 1.6–2.6)
MAGNESIUM SERPL-MCNC: 2.1 MG/DL — SIGNIFICANT CHANGE UP (ref 1.6–2.6)
MANUAL SMEAR VERIFICATION: SIGNIFICANT CHANGE UP
MCHC RBC-ENTMCNC: 31.8 PG — SIGNIFICANT CHANGE UP (ref 27–34)
MCHC RBC-ENTMCNC: 32.7 PG — SIGNIFICANT CHANGE UP (ref 27–34)
MCHC RBC-ENTMCNC: 33.2 G/DL — SIGNIFICANT CHANGE UP (ref 32–36)
MCHC RBC-ENTMCNC: 33.7 G/DL — SIGNIFICANT CHANGE UP (ref 32–36)
MCV RBC AUTO: 95.7 FL — SIGNIFICANT CHANGE UP (ref 80–100)
MCV RBC AUTO: 97.1 FL — SIGNIFICANT CHANGE UP (ref 80–100)
MESOTHL CELL # FLD: 6 % — SIGNIFICANT CHANGE UP
METAMYELOCYTES # FLD: 4 % — HIGH (ref 0–0)
MONOCYTES # BLD AUTO: 1.25 K/UL — HIGH (ref 0–0.9)
MONOCYTES NFR BLD AUTO: 9 % — SIGNIFICANT CHANGE UP (ref 2–14)
MONOS+MACROS # FLD: 3 % — SIGNIFICANT CHANGE UP
MRSA PCR RESULT.: SIGNIFICANT CHANGE UP
MYELOCYTES NFR BLD: 2 % — HIGH (ref 0–0)
NEUTROPHILS # BLD AUTO: 10.95 K/UL — HIGH (ref 1.8–7.4)
NEUTROPHILS NFR BLD AUTO: 71 % — SIGNIFICANT CHANGE UP (ref 43–77)
NEUTROPHILS-BODY FLUID: 87 % — SIGNIFICANT CHANGE UP
NEUTS BAND # BLD: 8 % — SIGNIFICANT CHANGE UP (ref 0–8)
NRBC # BLD: 0 /100 WBCS — SIGNIFICANT CHANGE UP (ref 0–0)
NRBC # BLD: 0 /100 WBCS — SIGNIFICANT CHANGE UP (ref 0–0)
PHOSPHATE SERPL-MCNC: 3.1 MG/DL — SIGNIFICANT CHANGE UP (ref 2.5–4.5)
PHOSPHATE SERPL-MCNC: 3.4 MG/DL — SIGNIFICANT CHANGE UP (ref 2.5–4.5)
PLAT MORPH BLD: NORMAL — SIGNIFICANT CHANGE UP
PLATELET # BLD AUTO: 184 K/UL — SIGNIFICANT CHANGE UP (ref 150–400)
PLATELET # BLD AUTO: 219 K/UL — SIGNIFICANT CHANGE UP (ref 150–400)
PLATELET COUNT - ESTIMATE: NORMAL — SIGNIFICANT CHANGE UP
POTASSIUM SERPL-MCNC: 4.9 MMOL/L — SIGNIFICANT CHANGE UP (ref 3.5–5.3)
POTASSIUM SERPL-MCNC: 5.2 MMOL/L — SIGNIFICANT CHANGE UP (ref 3.5–5.3)
POTASSIUM SERPL-SCNC: 4.9 MMOL/L — SIGNIFICANT CHANGE UP (ref 3.5–5.3)
POTASSIUM SERPL-SCNC: 5.2 MMOL/L — SIGNIFICANT CHANGE UP (ref 3.5–5.3)
PROT SERPL-MCNC: 7.4 G/DL — SIGNIFICANT CHANGE UP (ref 6–8.3)
PROT SERPL-MCNC: 8.4 G/DL — HIGH (ref 6–8.3)
PROTHROM AB SERPL-ACNC: 13.8 SEC — HIGH (ref 9.9–13.4)
RBC # BLD: 3.42 M/UL — LOW (ref 4.2–5.8)
RBC # BLD: 3.71 M/UL — LOW (ref 4.2–5.8)
RBC # FLD: 15.9 % — HIGH (ref 10.3–14.5)
RBC # FLD: 16 % — HIGH (ref 10.3–14.5)
RBC BLD AUTO: ABNORMAL
RCV VOL RI: 215 /UL — HIGH (ref 0–5)
S AUREUS DNA NOSE QL NAA+PROBE: DETECTED
SODIUM SERPL-SCNC: 133 MMOL/L — LOW (ref 135–145)
SODIUM SERPL-SCNC: 133 MMOL/L — LOW (ref 135–145)
TOTAL NUCLEATED CELL COUNT, BODY FLUID: 3780 /UL — SIGNIFICANT CHANGE UP
WBC # BLD: 13.05 K/UL — HIGH (ref 3.8–10.5)
WBC # BLD: 13.86 K/UL — HIGH (ref 3.8–10.5)
WBC # FLD AUTO: 13.05 K/UL — HIGH (ref 3.8–10.5)
WBC # FLD AUTO: 13.86 K/UL — HIGH (ref 3.8–10.5)

## 2024-11-19 PROCEDURE — 88305 TISSUE EXAM BY PATHOLOGIST: CPT | Mod: 26

## 2024-11-19 PROCEDURE — 88112 CYTOPATH CELL ENHANCE TECH: CPT | Mod: 26

## 2024-11-19 PROCEDURE — 99233 SBSQ HOSP IP/OBS HIGH 50: CPT | Mod: FS

## 2024-11-19 PROCEDURE — 49083 ABD PARACENTESIS W/IMAGING: CPT

## 2024-11-19 RX ORDER — CEFTRIAXONE SODIUM 1 G
2000 VIAL (EA) INJECTION EVERY 24 HOURS
Refills: 0 | Status: DISCONTINUED | OUTPATIENT
Start: 2024-11-20 | End: 2024-11-25

## 2024-11-19 RX ORDER — CEFTRIAXONE SODIUM 1 G
VIAL (EA) INJECTION
Refills: 0 | Status: DISCONTINUED | OUTPATIENT
Start: 2024-11-19 | End: 2024-11-25

## 2024-11-19 RX ORDER — CHLORHEXIDINE GLUCONATE 1.2 MG/ML
1 RINSE ORAL DAILY
Refills: 0 | Status: COMPLETED | OUTPATIENT
Start: 2024-11-19 | End: 2024-11-24

## 2024-11-19 RX ORDER — PANTOPRAZOLE SODIUM 40 MG/1
40 TABLET, DELAYED RELEASE ORAL
Refills: 0 | Status: DISCONTINUED | OUTPATIENT
Start: 2024-11-19 | End: 2024-11-25

## 2024-11-19 RX ORDER — HYDROMORPHONE HYDROCHLORIDE 2 MG/1
0.2 TABLET ORAL EVERY 4 HOURS
Refills: 0 | Status: DISCONTINUED | OUTPATIENT
Start: 2024-11-19 | End: 2024-11-25

## 2024-11-19 RX ORDER — POLYETHYLENE GLYCOL 3350 17 G/17G
17 POWDER, FOR SOLUTION ORAL DAILY
Refills: 0 | Status: DISCONTINUED | OUTPATIENT
Start: 2024-11-19 | End: 2024-11-25

## 2024-11-19 RX ORDER — SENNOSIDES 8.6 MG
2 TABLET ORAL AT BEDTIME
Refills: 0 | Status: DISCONTINUED | OUTPATIENT
Start: 2024-11-19 | End: 2024-11-25

## 2024-11-19 RX ORDER — CEFTRIAXONE SODIUM 1 G
2000 VIAL (EA) INJECTION ONCE
Refills: 0 | Status: COMPLETED | OUTPATIENT
Start: 2024-11-19 | End: 2024-11-19

## 2024-11-19 RX ADMIN — Medication 90 MILLIGRAM(S): at 06:14

## 2024-11-19 RX ADMIN — HYDROMORPHONE HYDROCHLORIDE 0.2 MILLIGRAM(S): 2 TABLET ORAL at 02:15

## 2024-11-19 RX ADMIN — ONDANSETRON HYDROCHLORIDE 4 MILLIGRAM(S): 4 TABLET, FILM COATED ORAL at 00:48

## 2024-11-19 RX ADMIN — Medication 100 MILLIGRAM(S): at 02:29

## 2024-11-19 RX ADMIN — HYDROMORPHONE HYDROCHLORIDE 0.2 MILLIGRAM(S): 2 TABLET ORAL at 12:55

## 2024-11-19 RX ADMIN — HYDROMORPHONE HYDROCHLORIDE 0.2 MILLIGRAM(S): 2 TABLET ORAL at 12:25

## 2024-11-19 RX ADMIN — Medication 10 MILLIGRAM(S): at 22:42

## 2024-11-19 RX ADMIN — HYDROMORPHONE HYDROCHLORIDE 0.2 MILLIGRAM(S): 2 TABLET ORAL at 02:30

## 2024-11-19 RX ADMIN — PANTOPRAZOLE SODIUM 40 MILLIGRAM(S): 40 TABLET, DELAYED RELEASE ORAL at 09:40

## 2024-11-19 RX ADMIN — TRAZODONE HYDROCHLORIDE 100 MILLIGRAM(S): 150 TABLET ORAL at 22:41

## 2024-11-19 RX ADMIN — CARVEDILOL 12.5 MILLIGRAM(S): 25 TABLET, FILM COATED ORAL at 18:22

## 2024-11-19 RX ADMIN — POLYETHYLENE GLYCOL 3350 17 GRAM(S): 17 POWDER, FOR SOLUTION ORAL at 12:25

## 2024-11-19 RX ADMIN — Medication 2000 MILLIGRAM(S): at 18:22

## 2024-11-19 RX ADMIN — CARVEDILOL 12.5 MILLIGRAM(S): 25 TABLET, FILM COATED ORAL at 06:14

## 2024-11-19 RX ADMIN — Medication 2 TABLET(S): at 22:42

## 2024-11-19 NOTE — CONSULT NOTE ADULT - SUBJECTIVE AND OBJECTIVE BOX
NEPHROLOGY MEDICAL CARE, [facility] - Dr. Domenic Griffiths/ Dr. Bishnu Amador/ Dr. Fili Smiley/ Dr. Naomie Crowder    Date of Service: 11-19-24    Patient was seen and examined at bedside.    Consultation requested by:  Joy Reyes    Reason for Consult: End Stage Renal Disease management        HPI:  54Y/ M with PMHx of HTN, HLD, DM (not on medications currently), CHF, ESRD on HD ( L UE AVF)  MWF([place], Nephro: Dr. Griffiths), ascites( follows with Dr. Li at [facility])  last drained 10/10/2024 presented  to the with intractable abdominal pain associated with nausea, bilious emesis x 4 . Patient states he states tolerated his HD session well after which he began experiencing 10/10 epigastric abdominal pain with bilious emesis x 4 ( last .episode .witnessed iun ER) . Patient repports improvement in pain after vomiting.  He denies chest pain, shortness of breath, cough, fever, chills, diarrhea, dysuria, hematuria, flank pain, and any additional complaints at this time.  No recent travel or sick contacts.  Patient completed his HD with UF 2.0kg        In the ER   VS   /80, RR18, HR 56, afebrile, sats 99% RA   s/p zofran, tylenol, pepcid , morphine   Labs s/o Hb 12.1, Na 134, BUn / Cr 31/ 5.13    CTAP c/w mod- large ascites , anasarca, Trace pericardial effusion, ? cystitis, Small hiatal hernia.    US abd- no acute finding    (18 Nov 2024 22:22)        PMH:   Type 2 diabetes mellitus    Hypertension    HLD (hyperlipidemia)    End stage renal disease    Bone spur    Anemia    Injury of right wrist    History of hyperkalemia    No pertinent past medical history        PSH:   S/P arthroscopy of right shoulder    History of vascular access device    H/O right wrist surgery    AV fistula        FAMILY HISTORY:  FH: hypertension  mother, father- alive    FH: type 2 diabetes  mother, father- alive        Social History:  non-smoker/ non-alcoholic     Home Meds:  Home Medications:  carvedilol 12.5 mg oral tablet: 1 tab(s) orally 2 times a day (06 Nov 2023 15:55)  lisinopril 40 mg oral tablet: 1 tab(s) orally once a day (18 Nov 2024 22:28)  lovastatin 10 mg oral tablet: 1 tab(s) orally once a day (at bedtime) (06 Nov 2023 14:37)  NIFEdipine (Eqv-Procardia XL) 90 mg oral tablet, extended release: 1 tab(s) orally once a day (06 Nov 2023 14:38)  traZODone 100 mg oral tablet: 1 orally once a day (at bedtime) (06 Nov 2023 14:37)      Allergies:  Allergies    No Known Allergies    Intolerances        REVIEW OF SYSTEMS:  CONSTITUTIONAL: No fever; No weight loss; No fatigue  EYES: No eye pain; No visual disturbances; No discharge  ENMT:  No difficulty hearing; No tinnitus;  No vertigo; No sinus; No throat pain  NECK: No pain; No stiffness  BREASTS: No pain; No masses; No nipple discharge  RESPIRATORY: No cough; No wheezing; No chills; No hemoptysis; No shortness of breath  CARDIOVASCULAR: No chest pain; No palpitations; No dizziness; No leg swelling  GASTROINTESTINAL: abdominal pain; No epigastric pain;  nausea; vomiting; No hematemesis; No diarrhea; No constipation. No melena   GENITOURINARY: No dysuria No frequency; No hematuria; No incontinence  NEUROLOGICAL: No headaches; No memory loss; No loss of strength; No numbness; No tremors  SKIN: No itching; No burning; No rashes  ENDOCRINE: No heat or cold intolerance; No hair loss  MUSCULOSKELETAL: No joint pain or swelling; No muscle, back, or extremity pain  PSYCHIATRIC: No depression; No anxiety; No mood swings; No difficulty sleeping  HEME/LYMPH: No easy bruising; No bleeding gums  ALLERY AND IMMUNOLOGIC: No hives or eczema    Vital Signs Last 24 Hrs  T(C): 37.1 (19 Nov 2024 12:57), Max: 37.9 (19 Nov 2024 02:08)  T(F): 98.8 (19 Nov 2024 12:57), Max: 100.3 (19 Nov 2024 02:08)  HR: 76 (19 Nov 2024 12:57) (56 - 80)  BP: 144/74 (19 Nov 2024 12:57) (144/74 - 178/73)  BP(mean): 99 (19 Nov 2024 02:08) (99 - 99)  RR: 20 (19 Nov 2024 12:57) (18 - 20)  SpO2: 95% (19 Nov 2024 12:57) (91% - 99%)    Parameters below as of 19 Nov 2024 12:57  Patient On (Oxygen Delivery Method): room air          PHYSICAL EXAM:  General: No acute respiratory distress.  Eyes: conjunctiva and sclera clear  ENMT: Atraumatic, Normocephalic, supple, No JVD present. Moist mucous membranes  Respiratory:   Bilaterally poor lungs; No rales, rhonchi, wheezing  Cardiovascular: S1S2+; no m/r/g  Gastrointestinal: Soft, Non-tender, distended; Bowel sounds presen  Neuro:  Awake, Alert & Oriented X3, No focal deficits present.   Ext:  2+ Peripheral Pulses and 3+ pedal edema, No Cyanosis  Skin: No rashes  Back: No CVA tenderness.  Dialysis Access::  Left upper arm AVF thrill/bruit+         LABS:                        11.2   13.86 )-----------( 184      ( 19 Nov 2024 06:55 )             33.2     11-19    133[L]  |  102  |  50[H]  ----------------------------<  141[H]  4.9   |  27  |  5.96[H]    Ca    8.7      19 Nov 2024 06:55  Phos  3.1     11-19  Mg     2.1     11-19    TPro  7.4  /  Alb  3.3[L]  /  TBili  0.6  /  DBili  x   /  AST  18  /  ALT  16  /  AlkPhos  80  11-19    PT/INR - ( 19 Nov 2024 00:03 )   PT: 13.8 sec;   INR: 1.18 ratio         PTT - ( 19 Nov 2024 00:03 )  PTT:33.1 sec  Urinalysis Basic - ( 19 Nov 2024 06:55 )    Color: x / Appearance: x / SG: x / pH: x  Gluc: 141 mg/dL / Ketone: x  / Bili: x / Urobili: x   Blood: x / Protein: x / Nitrite: x   Leuk Esterase: x / RBC: x / WBC x   Sq Epi: x / Non Sq Epi: x / Bacteria: x      Magnesium: 2.1 mg/dL (11-19 @ 06:55)  Phosphorus: 3.1 mg/dL (11-19 @ 06:55)  Magnesium: 2.1 mg/dL (11-19 @ 00:03)  Phosphorus: 3.4 mg/dL (11-19 @ 00:03)    Urine studies      Medications:  acetaminophen     Tablet .. 650 milliGRAM(s) Oral every 6 hours PRN  atorvastatin 10 milliGRAM(s) Oral at bedtime  bisacodyl 5 milliGRAM(s) Oral daily  carvedilol 12.5 milliGRAM(s) Oral every 12 hours  heparin   Injectable 5000 Unit(s) SubCutaneous every 8 hours  HYDROmorphone  Injectable 0.2 milliGRAM(s) IV Push every 4 hours PRN  NIFEdipine XL 90 milliGRAM(s) Oral daily  pantoprazole    Tablet 40 milliGRAM(s) Oral before breakfast  polyethylene glycol 3350 17 Gram(s) Oral daily  senna 2 Tablet(s) Oral at bedtime  traZODone 100 milliGRAM(s) Oral at bedtime

## 2024-11-19 NOTE — PROGRESS NOTE ADULT - SUBJECTIVE AND OBJECTIVE BOX
NP Note discussed with  Primary Attending    Patient is a 54y old  Male who presents with a chief complaint of intractable abdominal pain (19 Nov 2024 08:58)      INTERVAL HPI/OVERNIGHT EVENTS: no new complaints    MEDICATIONS  (STANDING):  atorvastatin 10 milliGRAM(s) Oral at bedtime  bisacodyl 5 milliGRAM(s) Oral daily  carvedilol 12.5 milliGRAM(s) Oral every 12 hours  heparin   Injectable 5000 Unit(s) SubCutaneous every 8 hours  NIFEdipine XL 90 milliGRAM(s) Oral daily  pantoprazole    Tablet 40 milliGRAM(s) Oral before breakfast  polyethylene glycol 3350 17 Gram(s) Oral daily  senna 2 Tablet(s) Oral at bedtime  traZODone 100 milliGRAM(s) Oral at bedtime    MEDICATIONS  (PRN):  acetaminophen     Tablet .. 650 milliGRAM(s) Oral every 6 hours PRN Temp greater or equal to 38C (100.4F), Mild Pain (1 - 3)  HYDROmorphone  Injectable 0.2 milliGRAM(s) IV Push every 4 hours PRN Pain      __________________________________________________  REVIEW OF SYSTEMS:    CONSTITUTIONAL: No fever,   EYES: no acute visual disturbances  NECK: No pain or stiffness  RESPIRATORY: No cough; No shortness of breath  CARDIOVASCULAR: No chest pain, no palpitations  GASTROINTESTINAL: No pain. No nausea or vomiting; No diarrhea   NEUROLOGICAL: No headache or numbness, no tremors  MUSCULOSKELETAL: No joint pain, no muscle pain  GENITOURINARY: no dysuria, no frequency, no hesitancy, constipation   PSYCHIATRY: no depression , no anxiety  ALL OTHER  ROS negative        Vital Signs Last 24 Hrs  T(C): 36.9 (19 Nov 2024 04:50), Max: 37.9 (19 Nov 2024 02:08)  T(F): 98.5 (19 Nov 2024 04:50), Max: 100.3 (19 Nov 2024 02:08)  HR: 80 (19 Nov 2024 04:50) (56 - 80)  BP: 165/66 (19 Nov 2024 04:50) (149/80 - 178/73)  BP(mean): 99 (19 Nov 2024 02:08) (99 - 99)  RR: 18 (19 Nov 2024 04:50) (18 - 18)  SpO2: 91% (19 Nov 2024 04:50) (91% - 99%)    Parameters below as of 19 Nov 2024 04:50  Patient On (Oxygen Delivery Method): room air        ________________________________________________  PHYSICAL EXAM:  GENERAL: NAD  HEENT: Normocephalic;  conjunctivae and sclerae clear; moist mucous membranes;   NECK : supple  CHEST/LUNG: Clear to auscultation bilaterally with good air entry   HEART: S1 S2  regular; no murmurs, gallops or rubs  ABDOMEN: firm, Nontender, distended; Bowel sounds present, constpation   EXTREMITIES: no cyanosis; no edema; no calf tenderness  SKIN: warm and dry; no rash  NERVOUS SYSTEM:  Awake and alert; Oriented  to place, person and time ; no new deficits    _________________________________________________  LABS:                        11.2   13.86 )-----------( 184      ( 19 Nov 2024 06:55 )             33.2     11-19    133[L]  |  102  |  50[H]  ----------------------------<  141[H]  4.9   |  27  |  5.96[H]    Ca    8.7      19 Nov 2024 06:55  Phos  3.1     11-19  Mg     2.1     11-19    TPro  7.4  /  Alb  3.3[L]  /  TBili  0.6  /  DBili  x   /  AST  18  /  ALT  16  /  AlkPhos  80  11-19    PT/INR - ( 19 Nov 2024 00:03 )   PT: 13.8 sec;   INR: 1.18 ratio         PTT - ( 19 Nov 2024 00:03 )  PTT:33.1 sec  Urinalysis Basic - ( 19 Nov 2024 06:55 )    Color: x / Appearance: x / SG: x / pH: x  Gluc: 141 mg/dL / Ketone: x  / Bili: x / Urobili: x   Blood: x / Protein: x / Nitrite: x   Leuk Esterase: x / RBC: x / WBC x   Sq Epi: x / Non Sq Epi: x / Bacteria: x      CAPILLARY BLOOD GLUCOSE      POCT Blood Glucose.: 142 mg/dL (18 Nov 2024 18:18)        RADIOLOGY & ADDITIONAL TESTS:  < from: US Abdomen Upper Quadrant Right (11.18.24 @ 20:26) >    ACC: [id number] EXAM:  US ABDOMEN RT UPR QUADRANT   ORDERED BY:  YUSRA REEVES     PROCEDURE DATE:  11/18/2024          INTERPRETATION:  CLINICAL INFORMATION: Bilious emesis, right upper   quadrant pain    COMPARISON: CT abdomen pelvis performed on the same day.    TECHNIQUE: Sonography of the right upper quadrant.    FINDINGS:  Liver: Within normal limits.  Bile ducts: Normal caliber. Common bile duct measures 3 mm.  Gallbladder: Within normal limits.  Pancreas: Poorly visualized.  Right kidney: 7.7 cm. No hydronephrosis. Atrophic.  Ascites: Moderate.  IVC: Visualized portions are within normal limits.    IMPRESSION:  Atrophic right kidney.    Moderate abdominal ascites    --- End of Report ---            MORTEZA PRINCE MD; Attending Radiologist  This document has been electronically signed. Nov 18 2024  8:39PM    < end of copied text >    Imaging Personally Reviewed:  YES    Consultant(s) Notes Reviewed:   YES    Plan of care was discussed with patient and /or primary care giver; all questions and concerns were addressed and care was aligned with patient's wishes.

## 2024-11-19 NOTE — PROGRESS NOTE ADULT - ASSESSMENT
54Y/ M with PMHx of HTN, HLD, DM (not on medications currently), CHF, ESRD on HD ( L UE AVF)  MWF( follows with Dr. Griffiths) , ascites( follows with Dr. Li at [facility])  last drained 10/10/2024 presented  to the with intractable abdominal pain associated with nausea, bilious emesis x 4 . In the ER, VSS, s/p  Zofran Tylenol Pepcid , morphine . Labs s/o Hb 12.1, Na 134, BUn / Cr 31/ 5.13. CTAP c/w large volume ascites     Being admitted to medicine for symptomatic treatment of  intractable abdominal pain  s/p CTX for SBP PPx   IR consulted for therapeutic and diagnostic paracentesis.   f/u Bcx   f/u constipation Bowl regimen initiated

## 2024-11-19 NOTE — CONSULT NOTE ADULT - SUBJECTIVE AND OBJECTIVE BOX
[  ] STAT REQUEST              [ X ] ROUTINE REQUEST    Patient is a 54 year old male with intractable abdominal pain. GI consulted to evaluate.         HPI:  54Y/ M with PMHx of HTN, HLD, DM (not on medications currently), CHF, ESRD on HD ( L UE AVF)  MWF( follows with Dr. Griffiths) , ascites( follows with Dr. Li at [facility])  last drained 10/10/2024 presented  to the with intractable abdominal pain associated with nausea, bilious emesis x 4 . Patient states he states tolerated his HD session well after which he began experiencing 10/10 epigastric abdominal pain with bilious emesis x 4 ( last .episode .witnessed iun ER) . Patient repports improvement in pain after vomiting.  He denies chest pain, shortness of breath, cough, fever, chills, diarrhea, dysuria, hematuria, flank pain, and any additional complaints at this time.  No recent travel or sick contacts.           PAIN MANAGEMENT:  Pain Scale:                10 /10  Pain Location:  Diffuse abdominal pain       PAST MEDICAL HISTORY    Type 2 diabetes mellitus    Hypertension    HLD (hyperlipidemia)    End stage renal disease    Bone spur    Anemia    Injury of right wrist    History of hyperkalemia    No pertinent past medical history        PAST SURGICAL HISTORY  S/P arthroscopy of right shoulder    History of vascular access device    H/O right wrist surgery    AV fistula        Allergies    No Known Allergies    Intolerances        HOME MEDICATIONS    MEDICATIONS  (STANDING):  atorvastatin 10 milliGRAM(s) Oral at bedtime  carvedilol 12.5 milliGRAM(s) Oral every 12 hours  heparin   Injectable 5000 Unit(s) SubCutaneous every 8 hours  NIFEdipine XL 90 milliGRAM(s) Oral daily  pantoprazole    Tablet 40 milliGRAM(s) Oral before breakfast  traZODone 100 milliGRAM(s) Oral at bedtime    MEDICATIONS  (PRN):  acetaminophen     Tablet .. 650 milliGRAM(s) Oral every 6 hours PRN Temp greater or equal to 38C (100.4F), Mild Pain (1 - 3)  HYDROmorphone  Injectable 0.2 milliGRAM(s) IV Push every 4 hours PRN Pain      SOCIAL HISTORY  Advanced Directives:       [ X ] Full Code       [  ] DNR  Marital Status:         [  ] M      [ X ] S      [  ] D       [  ] W  Children:       [ X ] Yes      [  ] No  Occupation:        [  ] Employed       [ X ] Unemployed       [  ] Retired  Diet:       [ X ] Regular       [  ] PEG feeding          [  ] NG tube feeding  Drug Use:           [X  ] Patient denied          [  ] Yes  Alcohol:           [  ] No             [ X ] Yes (socially)         [  ] Yes (chronic)  Tobacco:           [  ] Yes           [ X ] No    FAMILY HISTORY  [ X ] Heart Disease            [ X ] Diabetes             [ X ] HTN             [  ] Colon Cancer             [  ] Stomach Cancer              [  ] Pancreatic Cancer    VITAL SIGNS   Vital Signs Last 24 Hrs  T(C): 36.9 (24 @ 04:50), Max: 37.9 (24 @ 02:08)  T(F): 98.5 (24 @ 04:50), Max: 100.3 (24 @ 02:08)  HR: 80 (24 @ 04:50) (56 - 80)  BP: 165/66 (24 @ 04:50) (149/80 - 178/73)  BP(mean): 99 (24 @ 02:08) (99 - 99)  RR: 18 (24 @ 04:50) (18 - 18)  SpO2: 91% (24 @ 04:50) (91% - 99%)  Daily Height in cm: 180.34 (2024 02:08)    Daily Weight in k (2024 02:08)       CBC Full  -  ( 2024 06:55 )  WBC Count : 13.86 K/uL  RBC Count : 3.42 M/uL  Hemoglobin : 11.2 g/dL  Hematocrit : 33.2 %  Platelet Count - Automated : 184 K/uL  Mean Cell Volume : 97.1 fl  Mean Cell Hemoglobin : 32.7 pg  Mean Cell Hemoglobin Concentration : 33.7 g/dL  Auto Neutrophil # : x  Auto Lymphocyte # : x  Auto Monocyte # : x  Auto Eosinophil # : x  Auto Basophil # : x  Auto Neutrophil % : x  Auto Lymphocyte % : x  Auto Monocyte % : x  Auto Eosinophil % : x  Auto Basophil % : x          133[L]  |  102  |  50[H]  ----------------------------<  141[H]  4.9   |  27  |  5.96[H]    Ca    8.7      2024 06:55  Phos  3.1       Mg     2.1         TPro  7.4  /  Alb  3.3[L]  /  TBili  0.6  /  DBili  x   /  AST  18  /  ALT  16  /  AlkPhos  80        Lipase: 56 U/L ( @ 18:54)      PT/INR - ( 2024 00:03 )   PT: 13.8 sec;   INR: 1.18 ratio       PTT - ( 2024 00:03 )  PTT:33.1 sec         ECG    Ventricular Rate 73 BPM    Atrial Rate 73 BPM    P-R Interval 192 ms    QRS Duration 98 ms    Q-T Interval 410 ms    QTC Calculation(Bazett) 451 ms    P Axis 22 degrees    R Axis -53 degrees    T Axis 147 degrees    Diagnosis Line Normal sinus rhythm  Possible Left atrial enlargement  Incomplete right bundle branch block  Left anterior fascicular block  Left ventricular hypertrophy  ST & T wave abnormality, consider lateral ischemia  Abnormal ECG        RADIOLOGY/IMAGING                  ACC: [id number] EXAM:  CT ABDOMEN AND PELVIS   ORDERED BY:  YUSRA REEVES     PROCEDURE DATE:  2024          INTERPRETATION:  CLINICAL INFORMATION: Abdominal pain. Bilious emesis.   End-stage renal disease on hemodialysis.    COMPARISON: 10/29/2022 PM.    CONTRAST/COMPLICATIONS:  IV Contrast: NONE  Oral Contrast: NONE      PROCEDURE:  CT of the Abdomen and Pelvis was performed.  Sagittal and coronal reformats were performed.    FINDINGS:  LOWER CHEST: There is bilateral gynecomastia. The heart is borderline in   size. There is trace pericardial effusion. Coronary, valvular and aortic   calcifications are noted. There is a small hiatal hernia. Impacted airway   focal bronchiectasis of the left lower lobe is appreciated. This appears   worsened when compared to 10/29/2022..    LIVER: Within normal limits.  BILE DUCTS: Normal caliber.  GALLBLADDER: The gallbladder is distended..  SPLEEN: Calcified splenic granulomata and splenic vascular calcifications   are appreciated..  PANCREAS: Within normal limits.  ADRENALS: Within normal limits.  KIDNEYS/URETERS: The kidneys are atrophic. Calcifications of this   bilateral kidneys are likely vascular in nature. There is a left renal   cyst. There is no urinary tract obstruction.    BLADDER: Minimally distended though appears diffusely thick-walled.  REPRODUCTIVE ORGANS: The prostate gland is partially visualized.    BOWEL: No bowel obstruction. Appendix is normal. There is colonic   diverticulosis. There is a small hiatal hernia.  PERITONEUM/RETROPERITONEUM: Moderate to large volume ascites. Extra   peritoneal fat is seen herniating into the left lateral canal.  VESSELS: Extensive atherosclerotic changes.  LYMPH NODES: No lymphadenopathy.  ABDOMINAL WALL: Postsurgical changes are appreciated, compatible with   right inguinal and anterior abdominal wall herniorrhaphy. There is   generalized soft tissue edema superficial subcutaneous soft tissues.  BONES: Degenerative changes..    IMPRESSION:  Moderate to large volume ascites, and generalized anasarca.    Trace pericardial effusion.    Apparent wall thickening of the urinary bladder, likely related to its   underdistended nature. Cystitis may appear similar. Correlation with   urinalysis.    Small hiatal hernia.        ACC: [id number] EXAM:  US ABDOMEN RT UPR QUADRANT   ORDERED BY:  YUSRA REEVES     PROCEDURE DATE:  2024          INTERPRETATION:  CLINICAL INFORMATION: Bilious emesis, right upper   quadrant pain    COMPARISON: CT abdomen pelvis performed on the same day.    TECHNIQUE: Sonography of the right upper quadrant.    FINDINGS:  Liver: Within normal limits.  Bile ducts: Normal caliber. Common bile duct measures 3 mm.  Gallbladder: Within normal limits.  Pancreas: Poorly visualized.  Right kidney: 7.7 cm. No hydronephrosis. Atrophic.  Ascites: Moderate.  IVC: Visualized portions are within normal limits.    IMPRESSION:  Atrophic right kidney.    Moderate abdominal ascites         [  ] STAT REQUEST              [ X ] ROUTINE REQUEST    Patient is a 54 year old male with intractable abdominal pain. GI consulted to evaluate.         HPI:  54 year old male with past medical history significant for Hyperlipidemia, HTN, DM, CAD, CHF, CHF, ESRD on HD ( L UE AVF)  MWF( follows with Dr. Griffiths) , ascites( follows with Dr. Li at [facility])  last drained 10/10/2024 presented  to the emergency room with one day history of progressively worsening non radiating 10/10 intensity crampy periumbilical abdominal pain associated with nausea and a few episodes of non bloody vomiting. Patient denies hematemesis, hematochezia, melena, fever, chills, chest pain, SOB, cough, hematuria, dysuria or diarrhea.        PAIN MANAGEMENT:  Pain Scale:                10 /10  Pain Location:  Diffuse abdominal pain       PAST MEDICAL HISTORY    DM    Hypertension    Hyperlipidemia    ESRD on HD    Bone spur    Anemia    Injury of right wrist          PAST SURGICAL HISTORY    Arthroscopy of right shoulder    Vascular access device    Right wrist surgery    AV fistula        Allergies    No Known Allergies    Intolerances  None         MEDICATIONS  (STANDING):  atorvastatin 10 milliGRAM(s) Oral at bedtime  carvedilol 12.5 milliGRAM(s) Oral every 12 hours  heparin   Injectable 5000 Unit(s) SubCutaneous every 8 hours  NIFEdipine XL 90 milliGRAM(s) Oral daily  pantoprazole    Tablet 40 milliGRAM(s) Oral before breakfast  traZODone 100 milliGRAM(s) Oral at bedtime    MEDICATIONS  (PRN):  acetaminophen     Tablet .. 650 milliGRAM(s) Oral every 6 hours PRN Temp greater or equal to 38C (100.4F), Mild Pain (1 - 3)  HYDROmorphone  Injectable 0.2 milliGRAM(s) IV Push every 4 hours PRN Pain      SOCIAL HISTORY  Advanced Directives:       [ X ] Full Code       [  ] DNR  Marital Status:         [  ] M      [ X ] S      [  ] D       [  ] W  Children:       [ X ] Yes      [  ] No  Occupation:        [  ] Employed       [ X ] Unemployed       [  ] Retired  Diet:       [ X ] Regular       [  ] PEG feeding          [  ] NG tube feeding  Drug Use:           [X  ] Patient denied          [  ] Yes  Alcohol:           [  ] No             [ X ] Yes (socially)         [  ] Yes (chronic)  Tobacco:           [  ] Yes           [ X ] No    FAMILY HISTORY  [ X ] Heart Disease            [ X ] Diabetes             [ X ] HTN             [  ] Colon Cancer             [  ] Stomach Cancer              [  ] Pancreatic Cancer    VITAL SIGNS   Vital Signs Last 24 Hrs  T(C): 36.9 (24 @ 04:50), Max: 37.9 (24 @ 02:08)  T(F): 98.5 (24 @ 04:50), Max: 100.3 (24 @ 02:08)  HR: 80 (24 @ 04:50) (56 - 80)  BP: 165/66 (24 @ 04:50) (149/80 - 178/73)  BP(mean): 99 (24 @ 02:08) (99 - 99)  RR: 18 (24 @ 04:50) (18 - 18)  SpO2: 91% (24 @ 04:50) (91% - 99%)  Daily Height in cm: 180.34 (2024 02:08)    Daily Weight in k (2024 02:08)       CBC Full  -  ( 2024 06:55 )  WBC Count : 13.86 K/uL  RBC Count : 3.42 M/uL  Hemoglobin : 11.2 g/dL  Hematocrit : 33.2 %  Platelet Count - Automated : 184 K/uL  Mean Cell Volume : 97.1 fl  Mean Cell Hemoglobin : 32.7 pg  Mean Cell Hemoglobin Concentration : 33.7 g/dL  Auto Neutrophil # : x  Auto Lymphocyte # : x  Auto Monocyte # : x  Auto Eosinophil # : x  Auto Basophil # : x  Auto Neutrophil % : x  Auto Lymphocyte % : x  Auto Monocyte % : x  Auto Eosinophil % : x  Auto Basophil % : x          133[L]  |  102  |  50[H]  ----------------------------<  141[H]  4.9   |  27  |  5.96[H]    Ca    8.7      2024 06:55  Phos  3.1       Mg     2.1         TPro  7.4  /  Alb  3.3[L]  /  TBili  0.6  /  DBili  x   /  AST  18  /  ALT  16  /  AlkPhos  80        Lipase: 56 U/L ( @ 18:54)      PT/INR - ( 2024 00:03 )   PT: 13.8 sec;   INR: 1.18 ratio       PTT - ( 2024 00:03 )  PTT:33.1 sec         ECG    Ventricular Rate 73 BPM    Atrial Rate 73 BPM    P-R Interval 192 ms    QRS Duration 98 ms    Q-T Interval 410 ms    QTC Calculation(Bazett) 451 ms    P Axis 22 degrees    R Axis -53 degrees    T Axis 147 degrees    Diagnosis Line Normal sinus rhythm  Possible Left atrial enlargement  Incomplete right bundle branch block  Left anterior fascicular block  Left ventricular hypertrophy  ST & T wave abnormality, consider lateral ischemia  Abnormal ECG        RADIOLOGY/IMAGING                  ACC: [id number] EXAM:  CT ABDOMEN AND PELVIS   ORDERED BY:  YUSRA REEVES     PROCEDURE DATE:  2024          INTERPRETATION:  CLINICAL INFORMATION: Abdominal pain. Bilious emesis.   End-stage renal disease on hemodialysis.    COMPARISON: 10/29/2022 PM.    CONTRAST/COMPLICATIONS:  IV Contrast: NONE  Oral Contrast: NONE      PROCEDURE:  CT of the Abdomen and Pelvis was performed.  Sagittal and coronal reformats were performed.    FINDINGS:  LOWER CHEST: There is bilateral gynecomastia. The heart is borderline in   size. There is trace pericardial effusion. Coronary, valvular and aortic   calcifications are noted. There is a small hiatal hernia. Impacted airway   focal bronchiectasis of the left lower lobe is appreciated. This appears   worsened when compared to 10/29/2022..    LIVER: Within normal limits.  BILE DUCTS: Normal caliber.  GALLBLADDER: The gallbladder is distended..  SPLEEN: Calcified splenic granulomata and splenic vascular calcifications   are appreciated..  PANCREAS: Within normal limits.  ADRENALS: Within normal limits.  KIDNEYS/URETERS: The kidneys are atrophic. Calcifications of this   bilateral kidneys are likely vascular in nature. There is a left renal   cyst. There is no urinary tract obstruction.    BLADDER: Minimally distended though appears diffusely thick-walled.  REPRODUCTIVE ORGANS: The prostate gland is partially visualized.    BOWEL: No bowel obstruction. Appendix is normal. There is colonic   diverticulosis. There is a small hiatal hernia.  PERITONEUM/RETROPERITONEUM: Moderate to large volume ascites. Extra   peritoneal fat is seen herniating into the left lateral canal.  VESSELS: Extensive atherosclerotic changes.  LYMPH NODES: No lymphadenopathy.  ABDOMINAL WALL: Postsurgical changes are appreciated, compatible with   right inguinal and anterior abdominal wall herniorrhaphy. There is   generalized soft tissue edema superficial subcutaneous soft tissues.  BONES: Degenerative changes..    IMPRESSION:  Moderate to large volume ascites, and generalized anasarca.    Trace pericardial effusion.    Apparent wall thickening of the urinary bladder, likely related to its   underdistended nature. Cystitis may appear similar. Correlation with   urinalysis.    Small hiatal hernia.        ACC: [id number] EXAM:  US ABDOMEN RT UPR QUADRANT   ORDERED BY:  YUSRA REEVES     PROCEDURE DATE:  2024          INTERPRETATION:  CLINICAL INFORMATION: Bilious emesis, right upper   quadrant pain    COMPARISON: CT abdomen pelvis performed on the same day.    TECHNIQUE: Sonography of the right upper quadrant.    FINDINGS:  Liver: Within normal limits.  Bile ducts: Normal caliber. Common bile duct measures 3 mm.  Gallbladder: Within normal limits.  Pancreas: Poorly visualized.  Right kidney: 7.7 cm. No hydronephrosis. Atrophic.  Ascites: Moderate.  IVC: Visualized portions are within normal limits.    IMPRESSION:  Atrophic right kidney.    Moderate abdominal ascites

## 2024-11-19 NOTE — DISCHARGE NOTE PROVIDER - NSDCFUADDAPPT_GEN_ALL_CORE_FT
APPTS ARE READY TO BE MADE: [x ] YES    Best Family or Patient Contact (if needed):    Additional Information about above appointments (if needed):    1: New PCP  2:   3:     Other comments or requests:

## 2024-11-19 NOTE — PROCEDURE NOTE - PROCEDURE FINDINGS AND DETAILS
4600 cc serous fluid drained. Catheter removed and a sterile  dressing applied.  Specimens were obtained and sent for a complete evaluation including cytology.

## 2024-11-19 NOTE — DISCHARGE NOTE PROVIDER - ATTENDING DISCHARGE PHYSICAL EXAMINATION:
I, Beny Galeas, am the last provider completing and signing this document after my resident or NP has started the document. I have personally seen and examined the patient. I have collaborated with and supervised the midlevel practitioner on the discharge service for the patient. I agree with everything as documented by the midlevel practitioner. I have reviewed and made amendments to the documentation where necessary.    I was present with the Resident during the key portions of the history and exam. I discussed the case with the Resident and agree with the findings and plan as documented in the Resident 's note, unless noted below.    My physical exam on day of discharge:  Alert, answering qs appropriately, following commands  no murmurs heard  breathing comfortably  abdomen NT/ND BS+    #sepsis

## 2024-11-19 NOTE — DISCHARGE NOTE PROVIDER - NSDCCPCAREPLAN_GEN_ALL_CORE_FT
PRINCIPAL DISCHARGE DIAGNOSIS  Diagnosis: Ascites  Assessment and Plan of Treatment:      PRINCIPAL DISCHARGE DIAGNOSIS  Diagnosis: Ascites  Assessment and Plan of Treatment: You presented to the hospital with abdominal pain and abdominal distension. Your CT A/P showed that you had large volume ascites, You were evaluated by GI whom recommended a Theraputic and diagnostic paracentesis and removed 4600cc from your abdomin.      SECONDARY DISCHARGE DIAGNOSES  Diagnosis: HLD (hyperlipidemia)  Assessment and Plan of Treatment: You have a history of hyperlipidemia, which is when you have too much cholesterol in your blood. High amounts of cholesterol in your blood can put you at higher risks for heart attck, strokes and other health problems. Follow up with PCP for treatment goals, continue medication as prescribed, have liver function testing every 3 months as anti lipid medications can cause liver irritation, eat low fat meals, avoid red meat, butter, fried foods and cheese. Get daily exercise.    Diagnosis: HTN (hypertension)  Assessment and Plan of Treatment: You have a history of high blood pressure. High blood pressure is a condition that puts you at risk for heart attack, stroke and kidney disease. Please continue to take your medications as prescribed. You can also help control your blood pressure by maintaining a healthy weight, eating a diet low in fat and rich in fruits and vegetables, reduce the amount of salt in your diet. Also, reduce alcohol and try to include some form of physical activity daily for at least 30 mins. Follow up with your medical doctor to establish long term blood pressure treatment goals.  Notify your doctor if you have any of the following symptoms:   Dizziness, Lightheadedness, Blurry vision, Headache, Chest pain, Shortness of breath    Diagnosis: DM (diabetes mellitus)  Assessment and Plan of Treatment: Continue to follow with your primary care MD or your endocrinologist. Discuss what the goal hemoglobin A1C level is for you.  Follow a heart healthy diabetic diet. If you check your fingerstick glucose at home, call your MD if it is greater than 250mg/dL on 2 occasions or less than 100mg/dL on 2 occasions. Know signs of low blood sugar, such as: dizziness, shakiness, sweating, confusion, hunger, nervousness- drink 4 ounces apple juice if occurs and call your doctor. Know early signs of high blood sugar, such as: frequent urination, increased thirst, blurry vision, fatigue, headache - call your doctor if this occurs.    Diagnosis: ESRD on dialysis  Assessment and Plan of Treatment: You has a Hx of ESRD and are on HD MWF, dueing HD Excess fluids and waste products will be removed from your blood; your electrolytes will be balanced; your blood pressure will be controlled. Continue with your dialysis treatments. Follow with your nephrologist (kidney physician). Continue your medications as prescribed.     PRINCIPAL DISCHARGE DIAGNOSIS  Diagnosis: Ascites  Assessment and Plan of Treatment: You presented to the hospital with abdominal pain and abdominal distension. Your CT A/P showed that you had large volume ascites, You were evaluated by GI whom recommended a Theraputic and diagnostic paracentesis and removed 4600cc from your abdomin.      SECONDARY DISCHARGE DIAGNOSES  Diagnosis: SBP (spontaneous bacterial peritonitis)  Assessment and Plan of Treatment: You completed ceftriaxone for SBP.  Please follow up with PCP in 1 week for further medical management.    Diagnosis: ESRD on dialysis  Assessment and Plan of Treatment: You has a Hx of ESRD and are on HD MWF, dueing HD Excess fluids and waste products will be removed from your blood; your electrolytes will be balanced; your blood pressure will be controlled. Continue with your dialysis treatments. Follow with your nephrologist (kidney physician). Continue your medications as prescribed.    Diagnosis: HLD (hyperlipidemia)  Assessment and Plan of Treatment: You have a history of hyperlipidemia, which is when you have too much cholesterol in your blood. High amounts of cholesterol in your blood can put you at higher risks for heart attck, strokes and other health problems. Follow up with PCP for treatment goals, continue medication as prescribed, have liver function testing every 3 months as anti lipid medications can cause liver irritation, eat low fat meals, avoid red meat, butter, fried foods and cheese. Get daily exercise.    Diagnosis: HTN (hypertension)  Assessment and Plan of Treatment: You have a history of high blood pressure. High blood pressure is a condition that puts you at risk for heart attack, stroke and kidney disease. Please continue to take your medications as prescribed. You can also help control your blood pressure by maintaining a healthy weight, eating a diet low in fat and rich in fruits and vegetables, reduce the amount of salt in your diet. Also, reduce alcohol and try to include some form of physical activity daily for at least 30 mins. Follow up with your medical doctor to establish long term blood pressure treatment goals.  Notify your doctor if you have any of the following symptoms:   Dizziness, Lightheadedness, Blurry vision, Headache, Chest pain, Shortness of breath    Diagnosis: DM (diabetes mellitus)  Assessment and Plan of Treatment: Continue to follow with your primary care MD or your endocrinologist. Discuss what the goal hemoglobin A1C level is for you.  Follow a heart healthy diabetic diet. If you check your fingerstick glucose at home, call your MD if it is greater than 250mg/dL on 2 occasions or less than 100mg/dL on 2 occasions. Know signs of low blood sugar, such as: dizziness, shakiness, sweating, confusion, hunger, nervousness- drink 4 ounces apple juice if occurs and call your doctor. Know early signs of high blood sugar, such as: frequent urination, increased thirst, blurry vision, fatigue, headache - call your doctor if this occurs.     PRINCIPAL DISCHARGE DIAGNOSIS  Diagnosis: SBP (spontaneous bacterial peritonitis)  Assessment and Plan of Treatment: You were found with an infection of the ascites fluid in your abdomen. You completed a course of IV antibiotics. You will need to take oral antibiotics for a couple more days.  Please follow up with PCP in 1 week for further medical management.      SECONDARY DISCHARGE DIAGNOSES  Diagnosis: Ascites  Assessment and Plan of Treatment: You presented to the hospital with abdominal pain and abdominal distension. Your CT A/P showed that you had large volume ascites, You were evaluated by GI whom recommended a Theraputic and diagnostic paracentesis and removed 4600cc from your abdomin.    Diagnosis: ESRD on dialysis  Assessment and Plan of Treatment: You has a Hx of ESRD and are on HD MWF, dueing HD Excess fluids and waste products will be removed from your blood; your electrolytes will be balanced; your blood pressure will be controlled. Continue with your dialysis treatments. Follow with your nephrologist (kidney physician). Continue your medications as prescribed.    Diagnosis: HLD (hyperlipidemia)  Assessment and Plan of Treatment: You have a history of hyperlipidemia, which is when you have too much cholesterol in your blood. High amounts of cholesterol in your blood can put you at higher risks for heart attck, strokes and other health problems. Follow up with PCP for treatment goals, continue medication as prescribed, have liver function testing every 3 months as anti lipid medications can cause liver irritation, eat low fat meals, avoid red meat, butter, fried foods and cheese. Get daily exercise.    Diagnosis: HTN (hypertension)  Assessment and Plan of Treatment: You have a history of high blood pressure. High blood pressure is a condition that puts you at risk for heart attack, stroke and kidney disease. Please continue to take your medications as prescribed. You can also help control your blood pressure by maintaining a healthy weight, eating a diet low in fat and rich in fruits and vegetables, reduce the amount of salt in your diet. Also, reduce alcohol and try to include some form of physical activity daily for at least 30 mins. Follow up with your medical doctor to establish long term blood pressure treatment goals.  Notify your doctor if you have any of the following symptoms:   Dizziness, Lightheadedness, Blurry vision, Headache, Chest pain, Shortness of breath    Diagnosis: DM (diabetes mellitus)  Assessment and Plan of Treatment: Continue to follow with your primary care MD or your endocrinologist. Discuss what the goal hemoglobin A1C level is for you.  Follow a heart healthy diabetic diet. If you check your fingerstick glucose at home, call your MD if it is greater than 250mg/dL on 2 occasions or less than 100mg/dL on 2 occasions. Know signs of low blood sugar, such as: dizziness, shakiness, sweating, confusion, hunger, nervousness- drink 4 ounces apple juice if occurs and call your doctor. Know early signs of high blood sugar, such as: frequent urination, increased thirst, blurry vision, fatigue, headache - call your doctor if this occurs.    Diagnosis: SBP (spontaneous bacterial peritonitis)  Assessment and Plan of Treatment: You completed ceftriaxone for SBP.  Please follow up with PCP in 1 week for further medical management.

## 2024-11-19 NOTE — DISCHARGE NOTE PROVIDER - DATE OF DISCHARGE SERVICE:
Please see TE 3/19/21. Symbicort prescribed 3/19/22. No other questions or concerns at this time.     25-Nov-2024

## 2024-11-19 NOTE — DISCHARGE NOTE PROVIDER - NSDCMRMEDTOKEN_GEN_ALL_CORE_FT
carvedilol 12.5 mg oral tablet: 1 tab(s) orally 2 times a day  lisinopril 40 mg oral tablet: 1 tab(s) orally once a day  lovastatin 10 mg oral tablet: 1 tab(s) orally once a day (at bedtime)  NIFEdipine (Eqv-Procardia XL) 90 mg oral tablet, extended release: 1 tab(s) orally once a day  traZODone 100 mg oral tablet: 1 orally once a day (at bedtime)   carvedilol 12.5 mg oral tablet: 1 tab(s) orally 2 times a day  Lasix 20 mg oral tablet: 1 tab(s) orally once a day  lisinopril 40 mg oral tablet: 1 tab(s) orally once a day  lovastatin 10 mg oral tablet: 1 tab(s) orally once a day (at bedtime)  NIFEdipine (Eqv-Procardia XL) 90 mg oral tablet, extended release: 1 tab(s) orally once a day  traZODone 100 mg oral tablet: 1 orally once a day (at bedtime)   carvedilol 12.5 mg oral tablet: 1 tab(s) orally 2 times a day  cefuroxime 250 mg oral tablet: 1 tab(s) orally every 12 hours  Lasix 20 mg oral tablet: 1 tab(s) orally once a day  lisinopril 40 mg oral tablet: 1 tab(s) orally once a day  lovastatin 10 mg oral tablet: 1 tab(s) orally once a day (at bedtime)  NIFEdipine (Eqv-Procardia XL) 90 mg oral tablet, extended release: 1 tab(s) orally once a day  ondansetron 4 mg oral tablet: 1 tab(s) orally every 8 hours as needed for  nausea  pantoprazole 40 mg oral delayed release tablet: 1 tab(s) orally once a day (before a meal)  traZODone 100 mg oral tablet: 1 orally once a day (at bedtime)

## 2024-11-19 NOTE — ED ADULT NURSE REASSESSMENT NOTE - NS ED NURSE REASSESS COMMENT FT1
Pt actively vomiting, medicated as per MAR. Pt also actively shivering, unable to get blood cultures at this time. Dr. Villarreal made aware and will delay cultures at this time

## 2024-11-19 NOTE — PATIENT PROFILE ADULT - FALL HARM RISK - HARM RISK INTERVENTIONS

## 2024-11-19 NOTE — DISCHARGE NOTE PROVIDER - HOSPITAL COURSE
54Y/ M with PMHx of HTN, HLD, DM (not on medications currently), CHF, ESRD on HD ( L UE AVF)  MWF( follows with Dr. Griffiths) , ascites( follows with Dr. Li at [facility])  last drained 10/10/2024 presented  to the with intractable abdominal pain associated with nausea, bilious emesis x 4 . In the ER, VSS, s/p  Zofran Tylenol Pepcid , morphine . Labs s/o Hb 12.1, Na 134, BUn / Cr 31/ 5.13. CTAP c/w large volume ascites.      Being admitted to medicine for symptomatic treatment of  intractable abdominal pain  s/p CTX for SBP PPx   IR consulted for therapeutic and diagnostic paracentesis preformed 11/19/2024 with 4600cc removed.     INCOMPLETE 11/19/2024++++++++    Please note that this a brief summary of hospital course please refer to daily progress notes and consult notes for full course and events. Patient seen and examined at bedside, discussed with medical attending. Patient medically cleared for discharge to ++++++++++ 54Y/ M with PMHx of HTN, HLD, DM (not on medications currently), CHF, ESRD on HD ( L UE AVF)  MWF( follows with Dr. Griffiths) , ascites( follows with Dr. Li at [facility])  last drained 10/10/2024 presented  to the with intractable abdominal pain associated with nausea, bilious emesis x 4 . In the ER, VSS, s/p  Zofran Tylenol Pepcid , morphine . Labs s/o Hb 12.1, Na 134, BUn / Cr 31/ 5.13. CTAP c/w large volume ascites.      Being admitted to medicine for symptomatic treatment of  intractable abdominal pain  s/p CTX for SBP with ceftriaxone.   IR consulted for therapeutic and diagnostic paracentesis preformed 11/19/2024 with 4600cc removed.   GI consulted recommending EGD.       Please note that this a brief summary of hospital course please refer to daily progress notes and consult notes for full course and events. Patient seen and examined at bedside, discussed with medical attending. Patient medically cleared for discharge to home.

## 2024-11-19 NOTE — DISCHARGE NOTE PROVIDER - DETAILS OF MALNUTRITION DIAGNOSIS/DIAGNOSES
This patient has been assessed with a concern for Malnutrition and was treated during this hospitalization for the following Nutrition diagnosis/diagnoses:     -  11/25/2024: Moderate protein-calorie malnutrition

## 2024-11-19 NOTE — DISCHARGE NOTE PROVIDER - CARE PROVIDER_API CALL
FATOUMATA GAGNON  [address] [place] [address]  Phone: [phone]  Fax: [phone]  Follow Up Time: 1 week

## 2024-11-19 NOTE — DISCHARGE NOTE PROVIDER - NSFOLLOWUPCLINICS_GEN_ALL_ED_FT
[facility]  Internal Medicine  [address] [place] [address]  Phone: [phone]  Fax: [phone]  Follow Up Time: 2 weeks

## 2024-11-20 LAB
ALBUMIN FLD-MCNC: 2.7 G/DL — SIGNIFICANT CHANGE UP
ALBUMIN SERPL ELPH-MCNC: 2.9 G/DL — LOW (ref 3.5–5)
ALP SERPL-CCNC: 76 U/L — SIGNIFICANT CHANGE UP (ref 40–120)
ALT FLD-CCNC: 17 U/L DA — SIGNIFICANT CHANGE UP (ref 10–60)
ANION GAP SERPL CALC-SCNC: 6 MMOL/L — SIGNIFICANT CHANGE UP (ref 5–17)
AST SERPL-CCNC: 15 U/L — SIGNIFICANT CHANGE UP (ref 10–40)
BILIRUB SERPL-MCNC: 0.6 MG/DL — SIGNIFICANT CHANGE UP (ref 0.2–1.2)
BUN SERPL-MCNC: 68 MG/DL — HIGH (ref 7–18)
CALCIUM SERPL-MCNC: 8.9 MG/DL — SIGNIFICANT CHANGE UP (ref 8.4–10.5)
CHLORIDE SERPL-SCNC: 98 MMOL/L — SIGNIFICANT CHANGE UP (ref 96–108)
CO2 SERPL-SCNC: 26 MMOL/L — SIGNIFICANT CHANGE UP (ref 22–31)
CREAT SERPL-MCNC: 7.73 MG/DL — HIGH (ref 0.5–1.3)
EGFR: 8 ML/MIN/1.73M2 — LOW
GLUCOSE BLDC GLUCOMTR-MCNC: 164 MG/DL — HIGH (ref 70–99)
GLUCOSE BLDC GLUCOMTR-MCNC: 180 MG/DL — HIGH (ref 70–99)
GLUCOSE FLD-MCNC: 131 MG/DL — SIGNIFICANT CHANGE UP
GLUCOSE SERPL-MCNC: 196 MG/DL — HIGH (ref 70–99)
GRAM STN FLD: SIGNIFICANT CHANGE UP
HBV SURFACE AG SER-ACNC: SIGNIFICANT CHANGE UP
HCT VFR BLD CALC: 31.6 % — LOW (ref 39–50)
HGB BLD-MCNC: 10.9 G/DL — LOW (ref 13–17)
LDH SERPL L TO P-CCNC: 258 U/L — SIGNIFICANT CHANGE UP
MCHC RBC-ENTMCNC: 33.2 PG — SIGNIFICANT CHANGE UP (ref 27–34)
MCHC RBC-ENTMCNC: 34.5 G/DL — SIGNIFICANT CHANGE UP (ref 32–36)
MCV RBC AUTO: 96.3 FL — SIGNIFICANT CHANGE UP (ref 80–100)
NRBC # BLD: 0 /100 WBCS — SIGNIFICANT CHANGE UP (ref 0–0)
PLATELET # BLD AUTO: 149 K/UL — LOW (ref 150–400)
POTASSIUM SERPL-MCNC: 5.4 MMOL/L — HIGH (ref 3.5–5.3)
POTASSIUM SERPL-SCNC: 5.4 MMOL/L — HIGH (ref 3.5–5.3)
PROT FLD-MCNC: 5 G/DL — SIGNIFICANT CHANGE UP
PROT SERPL-MCNC: 7 G/DL — SIGNIFICANT CHANGE UP (ref 6–8.3)
RBC # BLD: 3.28 M/UL — LOW (ref 4.2–5.8)
RBC # FLD: 15.7 % — HIGH (ref 10.3–14.5)
SODIUM SERPL-SCNC: 130 MMOL/L — LOW (ref 135–145)
SPECIMEN SOURCE: SIGNIFICANT CHANGE UP
WBC # BLD: 14.81 K/UL — HIGH (ref 3.8–10.5)
WBC # FLD AUTO: 14.81 K/UL — HIGH (ref 3.8–10.5)

## 2024-11-20 PROCEDURE — 99497 ADVNCD CARE PLAN 30 MIN: CPT

## 2024-11-20 PROCEDURE — 99233 SBSQ HOSP IP/OBS HIGH 50: CPT | Mod: FS

## 2024-11-20 RX ORDER — FUROSEMIDE 40 MG/1
20 TABLET ORAL DAILY
Refills: 0 | Status: DISCONTINUED | OUTPATIENT
Start: 2024-11-20 | End: 2024-11-25

## 2024-11-20 RX ADMIN — CARVEDILOL 12.5 MILLIGRAM(S): 25 TABLET, FILM COATED ORAL at 17:11

## 2024-11-20 RX ADMIN — Medication 10 MILLIGRAM(S): at 21:51

## 2024-11-20 RX ADMIN — CHLORHEXIDINE GLUCONATE 1 APPLICATION(S): 1.2 RINSE ORAL at 14:28

## 2024-11-20 RX ADMIN — TRAZODONE HYDROCHLORIDE 100 MILLIGRAM(S): 150 TABLET ORAL at 21:51

## 2024-11-20 RX ADMIN — POLYETHYLENE GLYCOL 3350 17 GRAM(S): 17 POWDER, FOR SOLUTION ORAL at 14:25

## 2024-11-20 RX ADMIN — Medication 5 MILLIGRAM(S): at 14:28

## 2024-11-20 RX ADMIN — Medication 2 TABLET(S): at 21:51

## 2024-11-20 RX ADMIN — ACETAMINOPHEN 500MG 650 MILLIGRAM(S): 500 TABLET, COATED ORAL at 15:46

## 2024-11-20 RX ADMIN — Medication 100 MILLIGRAM(S): at 15:49

## 2024-11-20 RX ADMIN — Medication 1 APPLICATION(S): at 17:10

## 2024-11-20 RX ADMIN — Medication 1: at 17:09

## 2024-11-20 RX ADMIN — ACETAMINOPHEN 500MG 650 MILLIGRAM(S): 500 TABLET, COATED ORAL at 16:06

## 2024-11-20 RX ADMIN — FUROSEMIDE 20 MILLIGRAM(S): 40 TABLET ORAL at 02:26

## 2024-11-20 NOTE — PROGRESS NOTE ADULT - ASSESSMENT
1. ESRD on HD (M/W/F)  -HD today with 1.5kg/ HD orders were written and discussed with dialysis nurse.   -Hemodialysis Renal Diet and Fluid restriction to 1L/day  -Adjust meds to eGFR and avoid IV Gadolinium contrast,NSAIDs, and phosphate enema.  -Monitor Electrolytes daily.  2. HTN:   -bp is acceptable at present.  -continue bp meds  -titrate bp meds to keep sbp >110 and < 130  3. Anemia of ESRD:  -order Epogen if hb <10  -F/u CBC daily  -transfuse if HB < 7.0.  4. Mineral Bone Disease:  -continue phoslo tid.   -monitor phos  5. Abd pain due to SBP. Ascites due to heart failure.   -s/p paracentesis 4L on 11/19  -SBP with diagnostic tap.  -on rocephin and f/u cultures

## 2024-11-20 NOTE — PROGRESS NOTE ADULT - SUBJECTIVE AND OBJECTIVE BOX
[   ] ICU                                          [   ] CCU                                      [  X ] Medical Floor      Patient is a 54 year old male with intractable abdominal pain. GI consulted to evaluate.        54 year old male with past medical history significant for Hyperlipidemia, HTN, DM, CAD, CHF, CHF, ESRD on HD ( L UE AVF)  MWF( follows with Dr. Griffiths) , ascites( follows with Dr. Li at [facility])  last drained 10/10/2024 presented  to the emergency room with one day history of progressively worsening non radiating 10/10 intensity crampy periumbilical abdominal pain associated with nausea and a few episodes of non bloody vomiting. Patient denies hematemesis, hematochezia, melena, fever, chills, chest pain, SOB, cough, hematuria, dysuria or diarrhea.     Patient is comfortable. No new complaints reported, No nausea, vomiting, hematemesis, hematochezia, melena, fever, chills, chest pain, SOB, cough or diarrhea reported.          PAST MEDICAL HISTORY    DM    Hypertension    Hyperlipidemia    ESRD on HD    Bone spur    Anemia    Injury of right wrist          PAST SURGICAL HISTORY    Arthroscopy of right shoulder    Vascular access device    Right wrist surgery    AV fistula        Allergies    No Known Allergies    Intolerances  None         MEDICATIONS  (STANDING):  atorvastatin 10 milliGRAM(s) Oral at bedtime  carvedilol 12.5 milliGRAM(s) Oral every 12 hours  heparin   Injectable 5000 Unit(s) SubCutaneous every 8 hours  NIFEdipine XL 90 milliGRAM(s) Oral daily  pantoprazole    Tablet 40 milliGRAM(s) Oral before breakfast  traZODone 100 milliGRAM(s) Oral at bedtime    MEDICATIONS  (PRN):  acetaminophen     Tablet .. 650 milliGRAM(s) Oral every 6 hours PRN Temp greater or equal to 38C (100.4F), Mild Pain (1 - 3)  HYDROmorphone  Injectable 0.2 milliGRAM(s) IV Push every 4 hours PRN Pain      SOCIAL HISTORY  Advanced Directives:       [ X ] Full Code       [  ] DNR  Marital Status:         [  ] M      [ X ] S      [  ] D       [  ] W  Children:       [ X ] Yes      [  ] No  Occupation:        [  ] Employed       [ X ] Unemployed       [  ] Retired  Diet:       [ X ] Regular       [  ] PEG feeding          [  ] NG tube feeding  Drug Use:           [X  ] Patient denied          [  ] Yes  Alcohol:           [  ] No             [ X ] Yes (socially)         [  ] Yes (chronic)  Tobacco:           [  ] Yes           [ X ] No    FAMILY HISTORY  [ X ] Heart Disease            [ X ] Diabetes             [ X ] HTN             [  ] Colon Cancer             [  ] Stomach Cancer              [  ] Pancreatic Cancer        VITALS   Vital Signs Last 24 Hrs  T(C): 37.6 (11-20-24 @ 06:34), Max: 38.1 (11-20-24 @ 05:11)  T(F): 99.6 (11-20-24 @ 06:34), Max: 100.6 (11-20-24 @ 05:11)  HR: 71 (11-20-24 @ 05:11) (63 - 76)  BP: 127/62 (11-20-24 @ 05:11) (126/64 - 144/74)   RR: 17 (11-20-24 @ 05:11) (17 - 20)  SpO2: 90% (11-20-24 @ 05:11) (90% - 95%)      MEDICATIONS  (STANDING):  atorvastatin 10 milliGRAM(s) Oral at bedtime  bisacodyl 5 milliGRAM(s) Oral daily  carvedilol 12.5 milliGRAM(s) Oral every 12 hours  cefTRIAXone   IVPB      cefTRIAXone   IVPB 2000 milliGRAM(s) IV Intermittent every 24 hours  chlorhexidine 2% Cloths 1 Application(s) Topical daily  furosemide    Tablet 20 milliGRAM(s) Oral daily  heparin   Injectable 5000 Unit(s) SubCutaneous every 8 hours  mupirocin 2% Ointment 1 Application(s) Topical two times a day  NIFEdipine XL 90 milliGRAM(s) Oral daily  pantoprazole    Tablet 40 milliGRAM(s) Oral before breakfast  polyethylene glycol 3350 17 Gram(s) Oral daily  senna 2 Tablet(s) Oral at bedtime  traZODone 100 milliGRAM(s) Oral at bedtime    MEDICATIONS  (PRN):  acetaminophen     Tablet .. 650 milliGRAM(s) Oral every 6 hours PRN Temp greater or equal to 38C (100.4F), Mild Pain (1 - 3)  HYDROmorphone  Injectable 0.2 milliGRAM(s) IV Push every 4 hours PRN Pain                            11.2   13.86 )-----------( 184      ( 19 Nov 2024 06:55 )             33.2       11-19    133[L]  |  102  |  50[H]  ----------------------------<  141[H]  4.9   |  27  |  5.96[H]    Ca    8.7      19 Nov 2024 06:55  Phos  3.1     11-19  Mg     2.1     11-19    TPro  7.4  /  Alb  3.3[L]  /  TBili  0.6  /  DBili  x   /  AST  18  /  ALT  16  /  AlkPhos  80  11-19      PT/INR - ( 19 Nov 2024 00:03 )   PT: 13.8 sec;   INR: 1.18 ratio         PTT - ( 19 Nov 2024 00:03 )  PTT:33.1 sec

## 2024-11-20 NOTE — PROGRESS NOTE ADULT - SUBJECTIVE AND OBJECTIVE BOX
NEPHROLOGY MEDICAL CARE, [facility] - Dr. Domenic Griffiths/ Dr. Bishnu Amador/ Dr. Fili Smiley/ Dr. Naomie Crowder    Date of Service: 11-20-24    Patient was seen and examined at bedside.    CC: patient is okay and NAD    Vital Signs Last 24 Hrs  T(C): 37.2 (20 Nov 2024 13:50), Max: 38.1 (20 Nov 2024 05:11)  T(F): 98.9 (20 Nov 2024 13:50), Max: 100.6 (20 Nov 2024 05:11)  HR: 73 (20 Nov 2024 17:12) (63 - 73)  BP: 135/65 (20 Nov 2024 17:12) (126/64 - 148/68)  BP(mean): 82 (20 Nov 2024 17:12) (82 - 82)  RR: 16 (20 Nov 2024 13:50) (16 - 20)  SpO2: 97% (20 Nov 2024 13:50) (88% - 97%)    Parameters below as of 20 Nov 2024 13:50  Patient On (Oxygen Delivery Method): room air        11-20 @ 07:01  -  11-20 @ 18:32  --------------------------------------------------------  IN: 700 mL / OUT: 2700 mL / NET: -2000 mL        PHYSICAL EXAM:  General: No acute respiratory distress.  Eyes: conjunctiva and sclera clear  ENMT: Atraumatic, Normocephalic, supple  Respiratory:   Bilaterally poor lungs; No rales, rhonchi, wheezing  Cardiovascular: S1S2+; no m/r/g  Gastrointestinal: Soft, Non-tender, distended; Bowel sounds present  Neuro:  Awake, Alert & Oriented X3, No focal deficits present.   Ext:  2+ pedal edema, No Cyanosis  Skin: No rashes  Dialysis Access::  Left upper arm AVF thrill/bruit+     MEDICATIONS:  MEDICATIONS  (STANDING):  atorvastatin 10 milliGRAM(s) Oral at bedtime  bisacodyl 5 milliGRAM(s) Oral daily  carvedilol 12.5 milliGRAM(s) Oral every 12 hours  cefTRIAXone   IVPB      cefTRIAXone   IVPB 2000 milliGRAM(s) IV Intermittent every 24 hours  chlorhexidine 2% Cloths 1 Application(s) Topical daily  dextrose 50% Injectable 25 Gram(s) IV Push once  furosemide    Tablet 20 milliGRAM(s) Oral daily  heparin   Injectable 5000 Unit(s) SubCutaneous every 8 hours  insulin lispro (ADMELOG) corrective regimen sliding scale   SubCutaneous three times a day before meals  insulin lispro (ADMELOG) corrective regimen sliding scale   SubCutaneous at bedtime  mupirocin 2% Ointment 1 Application(s) Topical two times a day  NIFEdipine XL 90 milliGRAM(s) Oral daily  pantoprazole    Tablet 40 milliGRAM(s) Oral before breakfast  polyethylene glycol 3350 17 Gram(s) Oral daily  senna 2 Tablet(s) Oral at bedtime  traZODone 100 milliGRAM(s) Oral at bedtime    MEDICATIONS  (PRN):  acetaminophen     Tablet .. 650 milliGRAM(s) Oral every 6 hours PRN Temp greater or equal to 38C (100.4F), Mild Pain (1 - 3)  dextrose Oral Gel 15 Gram(s) Oral once PRN Blood Glucose LESS THAN 70 milliGRAM(s)/deciliter  HYDROmorphone  Injectable 0.2 milliGRAM(s) IV Push every 4 hours PRN Pain          LABS:                        10.9   14.81 )-----------( 149      ( 20 Nov 2024 10:00 )             31.6     11-20    130[L]  |  98  |  68[H]  ----------------------------<  196[H]  5.4[H]   |  26  |  7.73[H]    Ca    8.9      20 Nov 2024 10:00  Phos  3.1     11-19  Mg     2.1     11-19    TPro  7.0  /  Alb  2.9[L]  /  TBili  0.6  /  DBili  x   /  AST  15  /  ALT  17  /  AlkPhos  76  11-20    PT/INR - ( 19 Nov 2024 00:03 )   PT: 13.8 sec;   INR: 1.18 ratio         PTT - ( 19 Nov 2024 00:03 )  PTT:33.1 sec  Urinalysis Basic - ( 20 Nov 2024 10:00 )    Color: x / Appearance: x / SG: x / pH: x  Gluc: 196 mg/dL / Ketone: x  / Bili: x / Urobili: x   Blood: x / Protein: x / Nitrite: x   Leuk Esterase: x / RBC: x / WBC x   Sq Epi: x / Non Sq Epi: x / Bacteria: x        Urine studies    PTH and Vit D:

## 2024-11-20 NOTE — GOALS OF CARE CONVERSATION - ADVANCED CARE PLANNING - CONVERSATION DETAILS
Spoke in full detail regarding life sustaining treatment in the event of cardiac and respiratory arrest. Patient A+OX3, patient verbalizes that he wants to have CPR and mechanical ventilation. Patient verbalizes that his spouse Sofie Torres would be his HCP when he does not have medical capacity. Patient encouraged to verbalize concerns and emotional support given.

## 2024-11-20 NOTE — PROGRESS NOTE ADULT - ASSESSMENT
1. Abdominal pain  2. Ascites  3. SBP  4. S/p therapeutic and diagnostic paracentesis  5. Peptic ulcer disease less likely  6. Anemia    Suggestions:    1. Continue antibiotic   2. Diet as tolerated  3. Protonix 40mg daily  4. Avoid NSAID  5. ID evaluation  6. Monitor H/H  7. Transfuse PRBC as needed  8. DVT prophylaxis

## 2024-11-20 NOTE — PROGRESS NOTE ADULT - SUBJECTIVE AND OBJECTIVE BOX
NP Note discussed with  Primary Attending    Patient is a 54y old  Male who presents with a chief complaint of intractable abdominal pain (20 Nov 2024 09:23)      INTERVAL HPI/OVERNIGHT EVENTS: no new complaints    MEDICATIONS  (STANDING):  atorvastatin 10 milliGRAM(s) Oral at bedtime  bisacodyl 5 milliGRAM(s) Oral daily  carvedilol 12.5 milliGRAM(s) Oral every 12 hours  cefTRIAXone   IVPB      cefTRIAXone   IVPB 2000 milliGRAM(s) IV Intermittent every 24 hours  chlorhexidine 2% Cloths 1 Application(s) Topical daily  furosemide    Tablet 20 milliGRAM(s) Oral daily  heparin   Injectable 5000 Unit(s) SubCutaneous every 8 hours  mupirocin 2% Ointment 1 Application(s) Topical two times a day  NIFEdipine XL 90 milliGRAM(s) Oral daily  pantoprazole    Tablet 40 milliGRAM(s) Oral before breakfast  polyethylene glycol 3350 17 Gram(s) Oral daily  senna 2 Tablet(s) Oral at bedtime  traZODone 100 milliGRAM(s) Oral at bedtime    MEDICATIONS  (PRN):  acetaminophen     Tablet .. 650 milliGRAM(s) Oral every 6 hours PRN Temp greater or equal to 38C (100.4F), Mild Pain (1 - 3)  HYDROmorphone  Injectable 0.2 milliGRAM(s) IV Push every 4 hours PRN Pain      __________________________________________________  REVIEW OF SYSTEMS:    CONSTITUTIONAL: No fever,   EYES: no acute visual disturbances  NECK: No pain or stiffness  RESPIRATORY: No cough; No shortness of breath  CARDIOVASCULAR: No chest pain, no palpitations  GASTROINTESTINAL: No pain. No nausea or vomiting; No diarrhea   NEUROLOGICAL: No headache or numbness, no tremors  MUSCULOSKELETAL: No joint pain, no muscle pain  GENITOURINARY: no dysuria, no frequency, no hesitancy  PSYCHIATRY: no depression , no anxiety  ALL OTHER  ROS negative        Vital Signs Last 24 Hrs  T(C): 37.6 (20 Nov 2024 06:34), Max: 38.1 (20 Nov 2024 05:11)  T(F): 99.6 (20 Nov 2024 06:34), Max: 100.6 (20 Nov 2024 05:11)  HR: 71 (20 Nov 2024 05:11) (63 - 76)  BP: 127/62 (20 Nov 2024 05:11) (126/64 - 144/74)  BP(mean): --  RR: 17 (20 Nov 2024 05:11) (17 - 20)  SpO2: 90% (20 Nov 2024 05:11) (90% - 95%)    Parameters below as of 20 Nov 2024 05:11  Patient On (Oxygen Delivery Method): room air        ________________________________________________  PHYSICAL EXAM:  GENERAL: NAD  HEENT: Normocephalic;  conjunctivae and sclerae clear; moist mucous membranes;   NECK : supple  CHEST/LUNG: Clear to auscultation bilaterally with good air entry   HEART: S1 S2  regular; no murmurs, gallops or rubs  ABDOMEN: Soft, Nontender, Nondistended; Bowel sounds present  EXTREMITIES: no cyanosis; no edema; no calf tenderness  SKIN: warm and dry; no rash  NERVOUS SYSTEM:  Awake and alert; Oriented  to place, person and time ; no new deficits    _________________________________________________  LABS:                        11.2   13.86 )-----------( 184      ( 19 Nov 2024 06:55 )             33.2     11-19    133[L]  |  102  |  50[H]  ----------------------------<  141[H]  4.9   |  27  |  5.96[H]    Ca    8.7      19 Nov 2024 06:55  Phos  3.1     11-19  Mg     2.1     11-19    TPro  7.4  /  Alb  3.3[L]  /  TBili  0.6  /  DBili  x   /  AST  18  /  ALT  16  /  AlkPhos  80  11-19    PT/INR - ( 19 Nov 2024 00:03 )   PT: 13.8 sec;   INR: 1.18 ratio         PTT - ( 19 Nov 2024 00:03 )  PTT:33.1 sec  Urinalysis Basic - ( 19 Nov 2024 06:55 )    Color: x / Appearance: x / SG: x / pH: x  Gluc: 141 mg/dL / Ketone: x  / Bili: x / Urobili: x   Blood: x / Protein: x / Nitrite: x   Leuk Esterase: x / RBC: x / WBC x   Sq Epi: x / Non Sq Epi: x / Bacteria: x      CAPILLARY BLOOD GLUCOSE            RADIOLOGY & ADDITIONAL TESTS:    Imaging  Reviewed:  YES/NO    Consultant(s) Notes Reviewed:   YES/ No      Plan of care was discussed with patient and /or primary care giver; all questions and concerns were addressed  NP Note discussed with  Primary Attending    Patient is a 54y old  Male who presents with a chief complaint of intractable abdominal pain (20 Nov 2024 09:23)      INTERVAL HPI/OVERNIGHT EVENTS: no new complaints    MEDICATIONS  (STANDING):  atorvastatin 10 milliGRAM(s) Oral at bedtime  bisacodyl 5 milliGRAM(s) Oral daily  carvedilol 12.5 milliGRAM(s) Oral every 12 hours  cefTRIAXone   IVPB      cefTRIAXone   IVPB 2000 milliGRAM(s) IV Intermittent every 24 hours  chlorhexidine 2% Cloths 1 Application(s) Topical daily  furosemide    Tablet 20 milliGRAM(s) Oral daily  heparin   Injectable 5000 Unit(s) SubCutaneous every 8 hours  mupirocin 2% Ointment 1 Application(s) Topical two times a day  NIFEdipine XL 90 milliGRAM(s) Oral daily  pantoprazole    Tablet 40 milliGRAM(s) Oral before breakfast  polyethylene glycol 3350 17 Gram(s) Oral daily  senna 2 Tablet(s) Oral at bedtime  traZODone 100 milliGRAM(s) Oral at bedtime    MEDICATIONS  (PRN):  acetaminophen     Tablet .. 650 milliGRAM(s) Oral every 6 hours PRN Temp greater or equal to 38C (100.4F), Mild Pain (1 - 3)  HYDROmorphone  Injectable 0.2 milliGRAM(s) IV Push every 4 hours PRN Pain      __________________________________________________  REVIEW OF SYSTEMS:    CONSTITUTIONAL: No fever,   EYES: no acute visual disturbances  NECK: No pain or stiffness  RESPIRATORY: No cough; No shortness of breath  CARDIOVASCULAR: No chest pain, no palpitations  GASTROINTESTINAL: No pain. No nausea or vomiting; No diarrhea   NEUROLOGICAL: No headache or numbness, no tremors  MUSCULOSKELETAL: No joint pain, no muscle pain  GENITOURINARY: no dysuria, no frequency, no hesitancy  PSYCHIATRY: no depression , no anxiety  ALL OTHER  ROS negative        Vital Signs Last 24 Hrs  T(C): 37.6 (20 Nov 2024 06:34), Max: 38.1 (20 Nov 2024 05:11)  T(F): 99.6 (20 Nov 2024 06:34), Max: 100.6 (20 Nov 2024 05:11)  HR: 71 (20 Nov 2024 05:11) (63 - 76)  BP: 127/62 (20 Nov 2024 05:11) (126/64 - 144/74)  BP(mean): --  RR: 17 (20 Nov 2024 05:11) (17 - 20)  SpO2: 90% (20 Nov 2024 05:11) (90% - 95%)    Parameters below as of 20 Nov 2024 05:11  Patient On (Oxygen Delivery Method): room air        ________________________________________________  PHYSICAL EXAM:  GENERAL: NAD  HEENT: Normocephalic;  conjunctivae and sclerae clear; moist mucous membranes;   NECK : supple  CHEST/LUNG: Clear to auscultation bilaterally with good air entry   HEART: S1 S2  regular; no murmurs, gallops or rubs  ABDOMEN: Soft, Nontender, Nondistended; Bowel sounds present  EXTREMITIES: LUE AVF, no redness or swelling, no cyanosis; no edema; no calf tenderness  SKIN: warm and dry; no rash  NERVOUS SYSTEM:  Awake and alert; Oriented  to place, person and time ; no new deficits    _________________________________________________  LABS:                        11.2   13.86 )-----------( 184      ( 19 Nov 2024 06:55 )             33.2     11-19    133[L]  |  102  |  50[H]  ----------------------------<  141[H]  4.9   |  27  |  5.96[H]    Ca    8.7      19 Nov 2024 06:55  Phos  3.1     11-19  Mg     2.1     11-19    TPro  7.4  /  Alb  3.3[L]  /  TBili  0.6  /  DBili  x   /  AST  18  /  ALT  16  /  AlkPhos  80  11-19    PT/INR - ( 19 Nov 2024 00:03 )   PT: 13.8 sec;   INR: 1.18 ratio         PTT - ( 19 Nov 2024 00:03 )  PTT:33.1 sec  Urinalysis Basic - ( 19 Nov 2024 06:55 )    Color: x / Appearance: x / SG: x / pH: x  Gluc: 141 mg/dL / Ketone: x  / Bili: x / Urobili: x   Blood: x / Protein: x / Nitrite: x   Leuk Esterase: x / RBC: x / WBC x   Sq Epi: x / Non Sq Epi: x / Bacteria: x      CAPILLARY BLOOD GLUCOSE      RADIOLOGY & ADDITIONAL TESTS:  < from: US Abdomen Upper Quadrant Right (11.18.24 @ 20:26) >  ACC: [id number] EXAM:  US ABDOMEN RT UPR QUADRANT   ORDERED BY:  [email or web address]     PROCEDURE DATE:  11/18/2024          INTERPRETATION:  CLINICAL INFORMATION: Bilious emesis, right upper   quadrant pain    COMPARISON: CT abdomen pelvis performed on the same day.    TECHNIQUE: Sonography of the right upper quadrant.    FINDINGS:  Liver: Within normal limits.  Bile ducts: Normal caliber. Common bile duct measures 3 mm.  Gallbladder: Within normal limits.  Pancreas: Poorly visualized.  Right kidney: 7.7 cm. No hydronephrosis. Atrophic.  Ascites: Moderate.  IVC: Visualized portions are within normal limits.    IMPRESSION:  Atrophic right kidney.    Moderate abdominal ascites    --- End of Report ---    < end of copied text >  < from: CT Abdomen and Pelvis No Cont (11.18.24 @ 19:25) >  ACC: [id number] EXAM:  CT ABDOMEN AND PELVIS   ORDERED BY:  Metrasens     PROCEDURE DATE:  11/18/2024          INTERPRETATION:  CLINICAL INFORMATION: Abdominal pain. Bilious emesis.   End-stage renal disease on hemodialysis.    COMPARISON: 10/29/2022 PM.    CONTRAST/COMPLICATIONS:  IV Contrast: NONE  Oral Contrast: NONE      PROCEDURE:  CT of the Abdomen and Pelvis was performed.  Sagittal and coronal reformats were performed.    FINDINGS:  LOWER CHEST: There is bilateral gynecomastia. The heart is borderline in   size. There is trace pericardial effusion. Coronary, valvular and aortic   calcifications are noted. There is a small hiatal hernia. Impacted airway   focal bronchiectasis of the left lower lobe is appreciated. This appears   worsened when compared to 10/29/2022..    LIVER: Within normal limits.  BILE DUCTS: Normal caliber.  GALLBLADDER: The gallbladder is distended..  SPLEEN: Calcified splenic granulomata and splenic vascular calcifications   are appreciated..  PANCREAS: Within normal limits.  ADRENALS: Within normal limits.  KIDNEYS/URETERS: The kidneys are atrophic. Calcifications of this   bilateral kidneys are likely vascular in nature. There is a left renal   cyst. There is no urinary tract obstruction.    BLADDER: Minimally distended though appears diffusely thick-walled.  REPRODUCTIVE ORGANS: The prostate gland is partially visualized.    BOWEL: No bowel obstruction. Appendix is normal. There is colonic   diverticulosis. There is a small hiatal hernia.  PERITONEUM/RETROPERITONEUM: Moderate to large volume ascites. Extra   peritoneal fat is seen herniating into the left lateral canal.  VESSELS: Extensive atherosclerotic changes.  LYMPH NODES: No lymphadenopathy.  ABDOMINAL WALL: Postsurgical changes are appreciated, compatible with   right inguinal and anterior abdominal wall herniorrhaphy. There is   generalized soft tissue edema superficial subcutaneous soft tissues.  BONES: Degenerative changes..    IMPRESSION:  Moderate to large volume ascites, and generalized anasarca.    Trace pericardial effusion.    Apparent wall thickening of the urinary bladder, likely related to its   underdistended nature. Cystitis may appear similar. Correlation with   urinalysis.    Small hiatal hernia.        --- End of Report ---    < end of copied text >    Imaging  Reviewed:  YES    Consultant(s) Notes Reviewed:   YES      Plan of care was discussed with patient and /or primary care giver; all questions and concerns were addressed

## 2024-11-20 NOTE — PROGRESS NOTE ADULT - ASSESSMENT
55 y/o M with PMHx of HTN, HLD, DM (not on medications currently), CHF, ESRD on HD ( L UE AVF)  MWF( follows with Dr. Griffiths) , ascites( follows with Dr. Li at [facility])  last drained 10/10/2024 presented  to the with intractable abdominal pain associated with nausea, bilious emesis x 4 . In the ER, VSS, s/p  Zofran Tylenol Pepcid , morphine . Labs s/o Hb 12.1, Na 134, BUN/Cr 31/ 5.13. CTAP c/w large volume ascites. Being admitted to medicine for symptomatic treatment of  intractable abdominal pain. CTX for SBP PPX. IR consulted for therapeutic and diagnostic paracentesis. Blood culture negative. Patient spike fever however isolated.

## 2024-11-21 DIAGNOSIS — K65.2 SPONTANEOUS BACTERIAL PERITONITIS: ICD-10-CM

## 2024-11-21 LAB
GLUCOSE BLDC GLUCOMTR-MCNC: 124 MG/DL — HIGH (ref 70–99)
GLUCOSE BLDC GLUCOMTR-MCNC: 138 MG/DL — HIGH (ref 70–99)
GLUCOSE BLDC GLUCOMTR-MCNC: 184 MG/DL — HIGH (ref 70–99)
GLUCOSE BLDC GLUCOMTR-MCNC: 203 MG/DL — HIGH (ref 70–99)

## 2024-11-21 PROCEDURE — 99233 SBSQ HOSP IP/OBS HIGH 50: CPT | Mod: FS

## 2024-11-21 RX ADMIN — Medication 2 TABLET(S): at 22:29

## 2024-11-21 RX ADMIN — Medication 1 APPLICATION(S): at 17:19

## 2024-11-21 RX ADMIN — Medication 2: at 17:18

## 2024-11-21 RX ADMIN — CHLORHEXIDINE GLUCONATE 1 APPLICATION(S): 1.2 RINSE ORAL at 12:39

## 2024-11-21 RX ADMIN — HYDROMORPHONE HYDROCHLORIDE 0.2 MILLIGRAM(S): 2 TABLET ORAL at 08:10

## 2024-11-21 RX ADMIN — HYDROMORPHONE HYDROCHLORIDE 0.2 MILLIGRAM(S): 2 TABLET ORAL at 07:54

## 2024-11-21 RX ADMIN — CARVEDILOL 12.5 MILLIGRAM(S): 25 TABLET, FILM COATED ORAL at 05:19

## 2024-11-21 RX ADMIN — TRAZODONE HYDROCHLORIDE 100 MILLIGRAM(S): 150 TABLET ORAL at 22:29

## 2024-11-21 RX ADMIN — PANTOPRAZOLE SODIUM 40 MILLIGRAM(S): 40 TABLET, DELAYED RELEASE ORAL at 05:18

## 2024-11-21 RX ADMIN — Medication 90 MILLIGRAM(S): at 05:19

## 2024-11-21 RX ADMIN — Medication 100 MILLIGRAM(S): at 16:21

## 2024-11-21 RX ADMIN — Medication 1: at 12:39

## 2024-11-21 RX ADMIN — FUROSEMIDE 20 MILLIGRAM(S): 40 TABLET ORAL at 05:19

## 2024-11-21 RX ADMIN — Medication 1 APPLICATION(S): at 05:21

## 2024-11-21 RX ADMIN — Medication 10 MILLIGRAM(S): at 22:29

## 2024-11-21 RX ADMIN — Medication 5 MILLIGRAM(S): at 12:40

## 2024-11-21 NOTE — PROGRESS NOTE ADULT - SUBJECTIVE AND OBJECTIVE BOX
[   ] ICU                                          [   ] CCU                                      [  X ] Medical Floor    Patient is a 54 year old male with intractable abdominal pain. GI consulted to evaluate.        54 year old male with past medical history significant for Hyperlipidemia, HTN, DM, CAD, CHF, CHF, ESRD on HD ( L UE AVF)  MWF( follows with Dr. Griffiths) , ascites( [facility] with [facility]. [facility] at [facility])  last drained 10/10/2024 presented  to the emergency room with one day history of progressively worsening non radiating 10/10 intensity crampy periumbilical abdominal pain associated with nausea and a few episodes of non bloody vomiting. Patient denies hematemesis, hematochezia, melena, fever, chills, chest pain, SOB, cough, hematuria, dysuria or diarrhea.     Patient is comfortable. No new complaints reported, No nausea, vomiting, hematemesis, hematochezia, melena, fever, chills, chest pain, SOB, cough or diarrhea reported.          PAST MEDICAL HISTORY    DM    Hypertension    Hyperlipidemia    ESRD on HD    Bone spur    Anemia    Injury of right wrist          PAST SURGICAL HISTORY    Arthroscopy of right shoulder    Vascular access device    Right wrist surgery    AV fistula        Allergies    No Known Allergies    Intolerances  None         MEDICATIONS  (STANDING):  atorvastatin 10 milliGRAM(s) Oral at bedtime  carvedilol 12.5 milliGRAM(s) Oral every 12 hours  heparin   Injectable 5000 Unit(s) SubCutaneous every 8 hours  NIFEdipine XL 90 milliGRAM(s) Oral daily  pantoprazole    Tablet 40 milliGRAM(s) Oral before breakfast  traZODone 100 milliGRAM(s) Oral at bedtime    MEDICATIONS  (PRN):  acetaminophen     Tablet .. 650 milliGRAM(s) Oral every 6 hours PRN Temp greater or equal to 38C (100.4F), Mild Pain (1 - 3)  HYDROmorphone  Injectable 0.2 milliGRAM(s) IV Push every 4 hours PRN Pain      SOCIAL HISTORY  Advanced Directives:       [ X ] Full Code       [  ] DNR  Marital Status:         [  ] M      [ X ] S      [  ] D       [  ] W  Children:       [ X ] Yes      [  ] No  Occupation:        [  ] Employed       [ X ] Unemployed       [  ] Retired  Diet:       [ X ] Regular       [  ] PEG feeding          [  ] NG tube feeding  Drug Use:           [X  ] Patient denied          [  ] Yes  Alcohol:           [  ] No             [ X ] Yes (socially)         [  ] Yes (chronic)  Tobacco:           [  ] Yes           [ X ] No    FAMILY HISTORY  [ X ] Heart Disease            [ X ] Diabetes             [ X ] HTN             [  ] Colon Cancer             [  ] Stomach Cancer              [  ] Pancreatic Cancer        VITALS   Vital Signs Last 24 Hrs  T(C): 37.6 (11-21-24 @ 05:21), Max: 37.6 (11-20-24 @ 10:30)  T(F): 99.7 (11-21-24 @ 05:21), Max: 99.7 (11-20-24 @ 10:30)  HR: 76 (11-21-24 @ 05:21) (67 - 76)  BP: 166/79 (11-21-24 @ 05:21) (128/64 - 166/79)  BP(mean): 80 (11-20-24 @ 21:11) (80 - 82)  RR: 18 (11-21-24 @ 05:21) (16 - 18)  SpO2: 94% (11-21-24 @ 05:21) (88% - 97%)        MEDICATIONS  (STANDING):  atorvastatin 10 milliGRAM(s) Oral at bedtime  bisacodyl 5 milliGRAM(s) Oral daily  carvedilol 12.5 milliGRAM(s) Oral every 12 hours  cefTRIAXone   IVPB      cefTRIAXone   IVPB 2000 milliGRAM(s) IV Intermittent every 24 hours  chlorhexidine 2% Cloths 1 Application(s) Topical daily  dextrose 50% Injectable 25 Gram(s) IV Push once  furosemide    Tablet 20 milliGRAM(s) Oral daily  heparin   Injectable 5000 Unit(s) SubCutaneous every 8 hours  insulin lispro (ADMELOG) corrective regimen sliding scale   SubCutaneous three times a day before meals  insulin lispro (ADMELOG) corrective regimen sliding scale   SubCutaneous at bedtime  mupirocin 2% Ointment 1 Application(s) Topical two times a day  NIFEdipine XL 90 milliGRAM(s) Oral daily  pantoprazole    Tablet 40 milliGRAM(s) Oral before breakfast  polyethylene glycol 3350 17 Gram(s) Oral daily  senna 2 Tablet(s) Oral at bedtime  traZODone 100 milliGRAM(s) Oral at bedtime    MEDICATIONS  (PRN):  acetaminophen     Tablet .. 650 milliGRAM(s) Oral every 6 hours PRN Temp greater or equal to 38C (100.4F), Mild Pain (1 - 3)  dextrose Oral Gel 15 Gram(s) Oral once PRN Blood Glucose LESS THAN 70 milliGRAM(s)/deciliter  HYDROmorphone  Injectable 0.2 milliGRAM(s) IV Push every 4 hours PRN Pain                            10.9   14.81 )-----------( 149      ( 20 Nov 2024 10:00 )             31.6       11-20    130[L]  |  98  |  68[H]  ----------------------------<  196[H]  5.4[H]   |  26  |  7.73[H]    Ca    8.9      20 Nov 2024 10:00    TPro  7.0  /  Alb  2.9[L]  /  TBili  0.6  /  DBili  x   /  AST  15  /  ALT  17  /  AlkPhos  76  11-20

## 2024-11-21 NOTE — PROGRESS NOTE ADULT - SUBJECTIVE AND OBJECTIVE BOX
NEPHROLOGY MEDICAL CARE, [facility] - Dr. Domenic Griffiths/ Dr. Bishnu Amador/ Dr. Fili Smiley/ Dr. Naomie Crowder    Date of Service: 11-21-24    Patient was seen and examined at bedside.    CC: patient is okay and NAD    Vital Signs Last 24 Hrs  T(C): 37.6 (21 Nov 2024 05:21), Max: 37.6 (21 Nov 2024 05:21)  T(F): 99.7 (21 Nov 2024 05:21), Max: 99.7 (21 Nov 2024 05:21)  HR: 76 (21 Nov 2024 05:21) (67 - 76)  BP: 166/79 (21 Nov 2024 05:21) (128/64 - 166/79)  BP(mean): 80 (20 Nov 2024 21:11) (80 - 82)  RR: 18 (21 Nov 2024 05:21) (16 - 18)  SpO2: 94% (21 Nov 2024 05:21) (94% - 97%)    Parameters below as of 21 Nov 2024 05:21  Patient On (Oxygen Delivery Method): room air        11-20 @ 07:01  -  11-21 @ 07:00  --------------------------------------------------------  IN: 700 mL / OUT: 2700 mL / NET: -2000 mL        PHYSICAL EXAM:  General: No acute respiratory distress.  Eyes: conjunctiva and sclera clear  ENMT: Atraumatic, Normocephalic, supple  Respiratory:   Bilaterally poor lungs; No rales, rhonchi, wheezing  Cardiovascular: S1S2+; no m/r/g  Gastrointestinal: Soft, Non-tender, distended; Bowel sounds present  Neuro:  Awake, Alert & Oriented X3, No focal deficits present.   Ext:  2+ pedal edema, No Cyanosis  Skin: No rashes  Dialysis Access::  Left upper arm AVF thrill/bruit+     MEDICATIONS:  MEDICATIONS  (STANDING):  atorvastatin 10 milliGRAM(s) Oral at bedtime  bisacodyl 5 milliGRAM(s) Oral daily  carvedilol 12.5 milliGRAM(s) Oral every 12 hours  cefTRIAXone   IVPB      cefTRIAXone   IVPB 2000 milliGRAM(s) IV Intermittent every 24 hours  chlorhexidine 2% Cloths 1 Application(s) Topical daily  dextrose 50% Injectable 25 Gram(s) IV Push once  furosemide    Tablet 20 milliGRAM(s) Oral daily  heparin   Injectable 5000 Unit(s) SubCutaneous every 8 hours  insulin lispro (ADMELOG) corrective regimen sliding scale   SubCutaneous three times a day before meals  insulin lispro (ADMELOG) corrective regimen sliding scale   SubCutaneous at bedtime  mupirocin 2% Ointment 1 Application(s) Topical two times a day  NIFEdipine XL 90 milliGRAM(s) Oral daily  pantoprazole    Tablet 40 milliGRAM(s) Oral before breakfast  polyethylene glycol 3350 17 Gram(s) Oral daily  senna 2 Tablet(s) Oral at bedtime  traZODone 100 milliGRAM(s) Oral at bedtime    MEDICATIONS  (PRN):  acetaminophen     Tablet .. 650 milliGRAM(s) Oral every 6 hours PRN Temp greater or equal to 38C (100.4F), Mild Pain (1 - 3)  dextrose Oral Gel 15 Gram(s) Oral once PRN Blood Glucose LESS THAN 70 milliGRAM(s)/deciliter  HYDROmorphone  Injectable 0.2 milliGRAM(s) IV Push every 4 hours PRN Pain          LABS:                        10.9   14.81 )-----------( 149      ( 20 Nov 2024 10:00 )             31.6     11-20    130[L]  |  98  |  68[H]  ----------------------------<  196[H]  5.4[H]   |  26  |  7.73[H]    Ca    8.9      20 Nov 2024 10:00    TPro  7.0  /  Alb  2.9[L]  /  TBili  0.6  /  DBili  x   /  AST  15  /  ALT  17  /  AlkPhos  76  11-20      Urinalysis Basic - ( 20 Nov 2024 10:00 )    Color: x / Appearance: x / SG: x / pH: x  Gluc: 196 mg/dL / Ketone: x  / Bili: x / Urobili: x   Blood: x / Protein: x / Nitrite: x   Leuk Esterase: x / RBC: x / WBC x   Sq Epi: x / Non Sq Epi: x / Bacteria: x        Urine studies    PTH and Vit D:

## 2024-11-21 NOTE — PROGRESS NOTE ADULT - ASSESSMENT
53 y/o M with PMHx of HTN, HLD, DM (not on medications currently), CHF, ESRD on HD ( L UE AVF)  MWF( follows with Dr. Griffiths) , ascites( follows with Dr. Li at [facility])  last drained 10/10/2024 presented  to the with intractable abdominal pain associated with nausea, bilious emesis x 4 . In the ER, VSS, s/p  Zofran Tylenol Pepcid , morphine . Labs s/o Hb 12.1, Na 134, BUN/Cr 31/ 5.13. CTAP c/w large volume ascites. Being admitted to medicine for symptomatic treatment of  intractable abdominal pain. CTX for SBP PPX. IR consulted for therapeutic and diagnostic paracentesis. Blood culture negative. Patient spike fever however isolated.

## 2024-11-21 NOTE — PROGRESS NOTE ADULT - SUBJECTIVE AND OBJECTIVE BOX
NP Note discussed with  Primary Attending    Patient is a 54y old  Male who presents with a chief complaint of intractable abdominal pain (21 Nov 2024 09:21)      INTERVAL HPI/OVERNIGHT EVENTS: no new complaints    MEDICATIONS  (STANDING):  atorvastatin 10 milliGRAM(s) Oral at bedtime  bisacodyl 5 milliGRAM(s) Oral daily  carvedilol 12.5 milliGRAM(s) Oral every 12 hours  cefTRIAXone   IVPB      cefTRIAXone   IVPB 2000 milliGRAM(s) IV Intermittent every 24 hours  chlorhexidine 2% Cloths 1 Application(s) Topical daily  dextrose 50% Injectable 25 Gram(s) IV Push once  furosemide    Tablet 20 milliGRAM(s) Oral daily  heparin   Injectable 5000 Unit(s) SubCutaneous every 8 hours  insulin lispro (ADMELOG) corrective regimen sliding scale   SubCutaneous three times a day before meals  insulin lispro (ADMELOG) corrective regimen sliding scale   SubCutaneous at bedtime  mupirocin 2% Ointment 1 Application(s) Topical two times a day  NIFEdipine XL 90 milliGRAM(s) Oral daily  pantoprazole    Tablet 40 milliGRAM(s) Oral before breakfast  polyethylene glycol 3350 17 Gram(s) Oral daily  senna 2 Tablet(s) Oral at bedtime  traZODone 100 milliGRAM(s) Oral at bedtime    MEDICATIONS  (PRN):  acetaminophen     Tablet .. 650 milliGRAM(s) Oral every 6 hours PRN Temp greater or equal to 38C (100.4F), Mild Pain (1 - 3)  dextrose Oral Gel 15 Gram(s) Oral once PRN Blood Glucose LESS THAN 70 milliGRAM(s)/deciliter  HYDROmorphone  Injectable 0.2 milliGRAM(s) IV Push every 4 hours PRN Pain      __________________________________________________  REVIEW OF SYSTEMS:    CONSTITUTIONAL: No fever,   EYES: no acute visual disturbances  NECK: No pain or stiffness  RESPIRATORY: No cough; No shortness of breath  CARDIOVASCULAR: No chest pain, no palpitations  GASTROINTESTINAL: No pain. mild nausea no vomiting; No diarrhea   NEUROLOGICAL: No headache or numbness, no tremors  MUSCULOSKELETAL: No joint pain, no muscle pain  GENITOURINARY: no dysuria, no frequency, no hesitancy  PSYCHIATRY: no depression , no anxiety  ALL OTHER  ROS negative        Vital Signs Last 24 Hrs  T(C): 37.6 (21 Nov 2024 05:21), Max: 37.6 (21 Nov 2024 05:21)  T(F): 99.7 (21 Nov 2024 05:21), Max: 99.7 (21 Nov 2024 05:21)  HR: 76 (21 Nov 2024 05:21) (67 - 76)  BP: 166/79 (21 Nov 2024 05:21) (128/64 - 166/79)  BP(mean): 80 (20 Nov 2024 21:11) (80 - 82)  RR: 18 (21 Nov 2024 05:21) (16 - 18)  SpO2: 94% (21 Nov 2024 05:21) (94% - 97%)    Parameters below as of 21 Nov 2024 05:21  Patient On (Oxygen Delivery Method): room air        ________________________________________________  PHYSICAL EXAM:  GENERAL: NAD  HEENT: Normocephalic;  conjunctivae and sclerae clear; moist mucous membranes;   NECK : supple  CHEST/LUNG: Clear to auscultation bilaterally with good air entry   HEART: S1 S2  regular; no murmurs, gallops or rubs  ABDOMEN: Soft, Nontender, Nondistended; Bowel sounds present  EXTREMITIES: LUE AVF, no redness or swelling, no cyanosis; no edema; no calf tenderness  SKIN: warm and dry; no rash  NERVOUS SYSTEM:  Awake and alert; Oriented  to place, person and time ; no new deficits      _________________________________________________  LABS:                        10.9   14.81 )-----------( 149      ( 20 Nov 2024 10:00 )             31.6     11-20    130[L]  |  98  |  68[H]  ----------------------------<  196[H]  5.4[H]   |  26  |  7.73[H]    Ca    8.9      20 Nov 2024 10:00    TPro  7.0  /  Alb  2.9[L]  /  TBili  0.6  /  DBili  x   /  AST  15  /  ALT  17  /  AlkPhos  76  11-20      Urinalysis Basic - ( 20 Nov 2024 10:00 )    Color: x / Appearance: x / SG: x / pH: x  Gluc: 196 mg/dL / Ketone: x  / Bili: x / Urobili: x   Blood: x / Protein: x / Nitrite: x   Leuk Esterase: x / RBC: x / WBC x   Sq Epi: x / Non Sq Epi: x / Bacteria: x      CAPILLARY BLOOD GLUCOSE      POCT Blood Glucose.: 184 mg/dL (21 Nov 2024 11:57)  POCT Blood Glucose.: 138 mg/dL (21 Nov 2024 07:46)  POCT Blood Glucose.: 180 mg/dL (20 Nov 2024 21:28)  POCT Blood Glucose.: 164 mg/dL (20 Nov 2024 16:51)        RADIOLOGY & ADDITIONAL TESTS:  < from: US Abdomen Upper Quadrant Right (11.18.24 @ 20:26) >  ACC: [id number] EXAM:  US ABDOMEN RT UPR QUADRANT   ORDERED BY:  AirXP     PROCEDURE DATE:  11/18/2024          INTERPRETATION:  CLINICAL INFORMATION: Bilious emesis, right upper   quadrant pain    COMPARISON: CT abdomen pelvis performed on the same day.    TECHNIQUE: Sonography of the right upper quadrant.    FINDINGS:  Liver: Within normal limits.  Bile ducts: Normal caliber. Common bile duct measures 3 mm.  Gallbladder: Within normal limits.  Pancreas: Poorly visualized.  Right kidney: 7.7 cm. No hydronephrosis. Atrophic.  Ascites: Moderate.  IVC: Visualized portions are within normal limits.    IMPRESSION:  Atrophic right kidney.    Moderate abdominal ascites    --- End of Report ---    < end of copied text >  < from: CT Abdomen and Pelvis No Cont (11.18.24 @ 19:25) >  ACC: [id number] EXAM:  CT ABDOMEN AND PELVIS   ORDERED BY:  AirXP     PROCEDURE DATE:  11/18/2024          INTERPRETATION:  CLINICAL INFORMATION: Abdominal pain. Bilious emesis.   End-stage renal disease on hemodialysis.    COMPARISON: 10/29/2022 PM.    CONTRAST/COMPLICATIONS:  IV Contrast: NONE  Oral Contrast: NONE      PROCEDURE:  CT of the Abdomen and Pelvis was performed.  Sagittal and coronal reformats were performed.    FINDINGS:  LOWER CHEST: There is bilateral gynecomastia. The heart is borderline in   size. There is trace pericardial effusion. Coronary, valvular and aortic   calcifications are noted. There is a small hiatal hernia. Impacted airway   focal bronchiectasis of the left lower lobe is appreciated. This appears   worsened when compared to 10/29/2022..    LIVER: Within normal limits.  BILE DUCTS: Normal caliber.  GALLBLADDER: The gallbladder is distended..  SPLEEN: Calcified splenic granulomata and splenic vascular calcifications   are appreciated..  PANCREAS: Within normal limits.  ADRENALS: Within normal limits.  KIDNEYS/URETERS: The kidneys are atrophic. Calcifications of this   bilateral kidneys are likely vascular in nature. There is a left renal   cyst. There is no urinary tract obstruction.    BLADDER: Minimally distended though appears diffusely thick-walled.  REPRODUCTIVE ORGANS: The prostate gland is partially visualized.    BOWEL: No bowel obstruction. Appendix is normal. There is colonic   diverticulosis. There is a small hiatal hernia.  PERITONEUM/RETROPERITONEUM: Moderate to large volume ascites. Extra   peritoneal fat is seen herniating into the left lateral canal.  VESSELS: Extensive atherosclerotic changes.  LYMPH NODES: No lymphadenopathy.  ABDOMINAL WALL: Postsurgical changes are appreciated, compatible with   right inguinal and anterior abdominal wall herniorrhaphy. There is   generalized soft tissue edema superficial subcutaneous soft tissues.  BONES: Degenerative changes..    IMPRESSION:  Moderate to large volume ascites, and generalized anasarca.    Trace pericardial effusion.    Apparent wall thickening of the urinary bladder, likely related to its   underdistended nature. Cystitis may appear similar. Correlation with   urinalysis.    Small hiatal hernia.        --- End of Report ---    < end of copied text >    Imaging  Reviewed:  YES    Consultant(s) Notes Reviewed:   YES      Plan of care was discussed with patient and /or primary care giver; all questions and concerns were addressed

## 2024-11-21 NOTE — PROGRESS NOTE ADULT - ASSESSMENT
1. ESRD on HD (M/W/F)  -s/p HD yesterday with 1.5kg. Plan for HD tomorrow. HD orders were written and discussed with dialysis nurse.   -Hemodialysis Renal Diet and Fluid restriction to 1L/day  -Adjust meds to eGFR and avoid IV Gadolinium contrast,NSAIDs, and phosphate enema.  -Monitor Electrolytes daily.  2. HTN:   -bp is acceptable at present.  -continue bp meds  -titrate bp meds to keep sbp >110 and < 130  3. Anemia of ESRD:  -order Epogen if hb <10  -F/u CBC daily  -transfuse if HB < 7.0.  4. Mineral Bone Disease:  -continue phoslo tid.   -monitor phos  5. Abd pain due to SBP. Ascites due to heart failure.   -s/p paracentesis 4L on 11/19  -SBP with diagnostic tap.  -on rocephin and f/u cultures

## 2024-11-22 LAB
ALBUMIN SERPL ELPH-MCNC: 2.7 G/DL — LOW (ref 3.5–5)
ALP SERPL-CCNC: 89 U/L — SIGNIFICANT CHANGE UP (ref 40–120)
ALT FLD-CCNC: 17 U/L DA — SIGNIFICANT CHANGE UP (ref 10–60)
ANION GAP SERPL CALC-SCNC: 8 MMOL/L — SIGNIFICANT CHANGE UP (ref 5–17)
AST SERPL-CCNC: 16 U/L — SIGNIFICANT CHANGE UP (ref 10–40)
BILIRUB SERPL-MCNC: 0.5 MG/DL — SIGNIFICANT CHANGE UP (ref 0.2–1.2)
BUN SERPL-MCNC: 74 MG/DL — HIGH (ref 7–18)
CALCIUM SERPL-MCNC: 9.1 MG/DL — SIGNIFICANT CHANGE UP (ref 8.4–10.5)
CHLORIDE SERPL-SCNC: 103 MMOL/L — SIGNIFICANT CHANGE UP (ref 96–108)
CO2 SERPL-SCNC: 23 MMOL/L — SIGNIFICANT CHANGE UP (ref 22–31)
CREAT SERPL-MCNC: 8.43 MG/DL — HIGH (ref 0.5–1.3)
EGFR: 7 ML/MIN/1.73M2 — LOW
GLUCOSE BLDC GLUCOMTR-MCNC: 108 MG/DL — HIGH (ref 70–99)
GLUCOSE BLDC GLUCOMTR-MCNC: 155 MG/DL — HIGH (ref 70–99)
GLUCOSE BLDC GLUCOMTR-MCNC: 158 MG/DL — HIGH (ref 70–99)
GLUCOSE BLDC GLUCOMTR-MCNC: 94 MG/DL — SIGNIFICANT CHANGE UP (ref 70–99)
GLUCOSE SERPL-MCNC: 104 MG/DL — HIGH (ref 70–99)
HCT VFR BLD CALC: 29.6 % — LOW (ref 39–50)
HGB BLD-MCNC: 10.2 G/DL — LOW (ref 13–17)
MAGNESIUM SERPL-MCNC: 2.2 MG/DL — SIGNIFICANT CHANGE UP (ref 1.6–2.6)
MCHC RBC-ENTMCNC: 32.7 PG — SIGNIFICANT CHANGE UP (ref 27–34)
MCHC RBC-ENTMCNC: 34.5 G/DL — SIGNIFICANT CHANGE UP (ref 32–36)
MCV RBC AUTO: 94.9 FL — SIGNIFICANT CHANGE UP (ref 80–100)
NRBC # BLD: 0 /100 WBCS — SIGNIFICANT CHANGE UP (ref 0–0)
PHOSPHATE SERPL-MCNC: 3.1 MG/DL — SIGNIFICANT CHANGE UP (ref 2.5–4.5)
PLATELET # BLD AUTO: 167 K/UL — SIGNIFICANT CHANGE UP (ref 150–400)
POTASSIUM SERPL-MCNC: 4.6 MMOL/L — SIGNIFICANT CHANGE UP (ref 3.5–5.3)
POTASSIUM SERPL-SCNC: 4.6 MMOL/L — SIGNIFICANT CHANGE UP (ref 3.5–5.3)
PROT SERPL-MCNC: 7 G/DL — SIGNIFICANT CHANGE UP (ref 6–8.3)
RBC # BLD: 3.12 M/UL — LOW (ref 4.2–5.8)
RBC # FLD: 15.2 % — HIGH (ref 10.3–14.5)
SODIUM SERPL-SCNC: 134 MMOL/L — LOW (ref 135–145)
WBC # BLD: 7.7 K/UL — SIGNIFICANT CHANGE UP (ref 3.8–10.5)
WBC # FLD AUTO: 7.7 K/UL — SIGNIFICANT CHANGE UP (ref 3.8–10.5)

## 2024-11-22 PROCEDURE — 76705 ECHO EXAM OF ABDOMEN: CPT | Mod: 26

## 2024-11-22 PROCEDURE — 99232 SBSQ HOSP IP/OBS MODERATE 35: CPT | Mod: FS

## 2024-11-22 RX ADMIN — Medication 5000 UNIT(S): at 16:02

## 2024-11-22 RX ADMIN — Medication 1: at 08:14

## 2024-11-22 RX ADMIN — CARVEDILOL 12.5 MILLIGRAM(S): 25 TABLET, FILM COATED ORAL at 21:20

## 2024-11-22 RX ADMIN — Medication 100 MILLIGRAM(S): at 16:03

## 2024-11-22 RX ADMIN — Medication 1: at 11:48

## 2024-11-22 RX ADMIN — Medication 2 TABLET(S): at 21:20

## 2024-11-22 RX ADMIN — HYDROMORPHONE HYDROCHLORIDE 0.2 MILLIGRAM(S): 2 TABLET ORAL at 20:21

## 2024-11-22 RX ADMIN — CHLORHEXIDINE GLUCONATE 1 APPLICATION(S): 1.2 RINSE ORAL at 11:52

## 2024-11-22 RX ADMIN — Medication 10 MILLIGRAM(S): at 21:21

## 2024-11-22 RX ADMIN — PANTOPRAZOLE SODIUM 40 MILLIGRAM(S): 40 TABLET, DELAYED RELEASE ORAL at 06:44

## 2024-11-22 RX ADMIN — FUROSEMIDE 20 MILLIGRAM(S): 40 TABLET ORAL at 05:55

## 2024-11-22 RX ADMIN — POLYETHYLENE GLYCOL 3350 17 GRAM(S): 17 POWDER, FOR SOLUTION ORAL at 11:52

## 2024-11-22 RX ADMIN — Medication 5 MILLIGRAM(S): at 11:51

## 2024-11-22 RX ADMIN — TRAZODONE HYDROCHLORIDE 100 MILLIGRAM(S): 150 TABLET ORAL at 21:20

## 2024-11-22 RX ADMIN — HYDROMORPHONE HYDROCHLORIDE 0.2 MILLIGRAM(S): 2 TABLET ORAL at 21:12

## 2024-11-22 RX ADMIN — Medication 1 APPLICATION(S): at 06:28

## 2024-11-22 RX ADMIN — Medication 1 APPLICATION(S): at 21:20

## 2024-11-22 NOTE — PROGRESS NOTE ADULT - PROBLEM SELECTOR PLAN 10
DVT PPX: heparin   GI PPX: PPI
heparin sc
DVT PPX: heparin   GI PPX: PPI
DVT PPX: heparin   GI PPX: PPI

## 2024-11-22 NOTE — CONSULT NOTE ADULT - SUBJECTIVE AND OBJECTIVE BOX
HPI:  54Y/ M with PMHx of HTN, HLD, DM (not on medications currently), CHF, ESRD on HD ( L UE AVF)  MWF( follows with Dr. Griffiths) , ascites( follows with Dr. Li at [facility])  last drained 10/10/2024 presented  to the with intractable abdominal pain associated with nausea, bilious emesis x 4 . Patient states he states tolerated his HD session well after which he began experiencing 10/10 epigastric abdominal pain with bilious emesis x 4 ( last .episode .witnessed iun ER) . Patient repports improvement in pain after vomiting.  He denies chest pain, shortness of breath, cough, fever, chills, diarrhea, dysuria, hematuria, flank pain, and any additional complaints at this time.  No recent travel or sick contacts.                      PAST MEDICAL & SURGICAL HISTORY:  Type 2 diabetes mellitus      Hypertension      End stage renal disease  started HD 2/2019 T, Th, Sat via right chest permacath      Bone spur  right shoulder- hx of - sx done      Anemia      Injury of right wrist  hx of at age 14      History of hyperkalemia  before HD- K-6.2 - to repeat in am of sx, pt had HD today      S/P arthroscopy of right shoulder  2010      History of vascular access device  right chest permacath - 2/2019      H/O right wrist surgery  tendons repair - at age 14      AV fistula          No Known Allergies      Meds:  acetaminophen     Tablet .. 650 milliGRAM(s) Oral every 6 hours PRN  atorvastatin 10 milliGRAM(s) Oral at bedtime  bisacodyl 5 milliGRAM(s) Oral daily  carvedilol 12.5 milliGRAM(s) Oral every 12 hours  cefTRIAXone   IVPB      cefTRIAXone   IVPB 2000 milliGRAM(s) IV Intermittent every 24 hours  chlorhexidine 2% Cloths 1 Application(s) Topical daily  dextrose 50% Injectable 25 Gram(s) IV Push once  dextrose Oral Gel 15 Gram(s) Oral once PRN  furosemide    Tablet 20 milliGRAM(s) Oral daily  heparin   Injectable 5000 Unit(s) SubCutaneous every 8 hours  HYDROmorphone  Injectable 0.2 milliGRAM(s) IV Push every 4 hours PRN  insulin lispro (ADMELOG) corrective regimen sliding scale   SubCutaneous three times a day before meals  insulin lispro (ADMELOG) corrective regimen sliding scale   SubCutaneous at bedtime  mupirocin 2% Ointment 1 Application(s) Topical two times a day  NIFEdipine XL 90 milliGRAM(s) Oral daily  pantoprazole    Tablet 40 milliGRAM(s) Oral before breakfast  polyethylene glycol 3350 17 Gram(s) Oral daily  senna 2 Tablet(s) Oral at bedtime  traZODone 100 milliGRAM(s) Oral at bedtime      SOCIAL HISTORY:  Smoker:  YES / NO        PACK YEARS:                         WHEN QUIT?  ETOH use:  YES / NO               FREQUENCY / QUANTITY:  Ilicit Drug use:  YES / NO  Occupation:  Assisted device use (Cane / Walker):  Live with:    FAMILY HISTORY:  FH: hypertension  mother, father- alive    FH: type 2 diabetes  mother, father- alive        VITALS:  Vital Signs Last 24 Hrs  T(C): 36.8 (22 Nov 2024 11:57), Max: 37.2 (22 Nov 2024 05:23)  T(F): 98.3 (22 Nov 2024 11:57), Max: 98.9 (22 Nov 2024 05:23)  HR: 75 (22 Nov 2024 11:57) (73 - 77)  BP: 122/62 (22 Nov 2024 11:57) (122/62 - 129/60)  BP(mean): 71 (21 Nov 2024 21:37) (71 - 71)  RR: 15 (22 Nov 2024 11:57) (15 - 18)  SpO2: 94% (22 Nov 2024 11:57) (93% - 95%)    Parameters below as of 22 Nov 2024 11:57  Patient On (Oxygen Delivery Method): room air        LABS/DIAGNOSTIC TESTS:                          10.2   7.70  )-----------( 167      ( 22 Nov 2024 16:45 )             29.6     WBC Count: 7.70 K/uL (11-22 @ 16:45)  WBC Count: 14.81 K/uL (11-20 @ 10:00)      11-22    134[L]  |  103  |  74[H]  ----------------------------<  104[H]  4.6   |  23  |  8.43[H]    Ca    9.1      22 Nov 2024 16:45  Protein Total, Fluid: 5.0:    Lactate Dehydrogenase, Fluid: 258:    Glucose, Fluid: 131:    Albumin, Fluid: 2.7:  Fluid Type: Peritoneal fl  Body Fluid Appearance: Slightly Cloudy  Color - Body Fluid: Yellow  Total Nucleated Cell Count, Body Fluid: 3780: Reference Ranges:    Total RBC Count: 215 /uL  Neutrophils-Body Fluid: 87 %  BF Lymphocytes: 4 %  Monocyte/Macrophage Count - Body Fluid: 3 %  Mesothelial Cells - Body Fluid: 6 %  Comment - Fluids: Differential is based on 100 cells counted after cytocentrifugationPhos  3.1     11-22  Mg     2.2     11-22    TPro  7.0  /  Alb  2.7[L]  /  TBili  0.5  /  DBili  x   /  AST  16  /  ALT  17  /  AlkPhos  89  11-22            LIVER FUNCTIONS - ( 22 Nov 2024 16:45 )  Alb: 2.7 g/dL / Pro: 7.0 g/dL / ALK PHOS: 89 U/L / ALT: 17 U/L DA / AST: 16 U/L / GGT: x                 LACTATE:    ABG -     CULTURES:   Ascites Fl  11-19 @ 15:33   No growth  --    polymorphonuclear leukocytes seen  No organisms seen  by cytocentrifuge      .Blood BLOOD  11-18 @ 02:30   No growth at 72 Hours  --  --      .Blood BLOOD  11-18 @ 02:20   No growth at 72 Hours  --  --          RADIOLOGY:< from: US Abdomen Limited (11.22.24 @ 10:46) >  ACC: [id number] EXAM:  US ABDOMEN LIMITED   ORDERED BY: IDALIA ZIMMER     PROCEDURE DATE:  11/22/2024          INTERPRETATION:  CLINICAL INFORMATION: Ascites    COMPARISON: None available.    TECHNIQUE: Limited ultrasound of the abdomen to evaluate for ascites.    Findings: Ascites in the right upper quadrant, left upper quadrant and   the left lower quadrant abdomen with the largest ascites pocket in the   left lower quadrant abdomen measuring 19.2 x 11.6 x 15.2 cm.    IMPRESSION:  Ascites as above.    --- End of Report ---            REBECCA WILDER MD; Attending Radiologist  This document has been electronically signed. Nov 22 2024 12:26PM    < end of copied text >  ----------------------------------------------------------------------------------------------------------------  ACC: [id number] EXAM:  CT ABDOMEN AND PELVIS   ORDERED BY:  YUSRA REEVES     PROCEDURE DATE:  11/18/2024          INTERPRETATION:  CLINICAL INFORMATION: Abdominal pain. Bilious emesis.   End-stage renal disease on hemodialysis.    COMPARISON: 10/29/2022 PM.    CONTRAST/COMPLICATIONS:  IV Contrast: NONE  Oral Contrast: NONE      PROCEDURE:  CT of the Abdomen and Pelvis was performed.  Sagittal and coronal reformats were performed.    FINDINGS:  LOWER CHEST: There is bilateral gynecomastia. The heart is borderline in   size. There is trace pericardial effusion. Coronary, valvular and aortic   calcifications are noted. There is a small hiatal hernia. Impacted airway   focal bronchiectasis of the left lower lobe is appreciated. This appears   worsened when compared to 10/29/2022..    LIVER: Within normal limits.  BILE DUCTS: Normal caliber.  GALLBLADDER: The gallbladder is distended..  SPLEEN: Calcified splenic granulomata and splenic vascular calcifications   are appreciated..  PANCREAS: Within normal limits.  ADRENALS: Within normal limits.  KIDNEYS/URETERS: The kidneys are atrophic. Calcifications of this   bilateral kidneys are likely vascular in nature. There is a left renal   cyst. There is no urinary tract obstruction.    BLADDER: Minimally distended though appears diffusely thick-walled.  REPRODUCTIVE ORGANS: The prostate gland is partially visualized.    BOWEL: No bowel obstruction. Appendix is normal. There is colonic   diverticulosis. There is a small hiatal hernia.  PERITONEUM/RETROPERITONEUM: Moderate to large volume ascites. Extra   peritoneal fat is seen herniating into the left lateral canal.  VESSELS: Extensive atherosclerotic changes.  LYMPH NODES: No lymphadenopathy.  ABDOMINAL WALL: Postsurgical changes are appreciated, compatible with   right inguinal and anterior abdominal wall herniorrhaphy. There is   generalized soft tissue edema superficial subcutaneous soft tissues.  BONES: Degenerative changes..    IMPRESSION:  Moderate to large volume ascites, and generalized anasarca.    Trace pericardial effusion.    Apparent wall thickening of the urinary bladder, likely related to its   underdistended nature. Cystitis may appear similar. Correlation with   urinalysis.    Small hiatal hernia.        --- End of Report ---            LUIZA IVAN M.D., Attending Radiologist  This document has been electronically signed. Nov 18 2024  8:02PM    < end of copied text >        ROS  [  ] UNABLE TO ELICIT               HPI:  54Y/ M with PMHx of HTN, HLD, DM (not on medications currently), CHF, ESRD on HD ( L UE AVF)  MWF( follows with Dr. Griffiths) , ascites( follows with Dr. Li at [facility])  last drained 10/10/2024 presented  to the with intractable abdominal pain associated with nausea, bilious emesis x 4 . Patient states he states tolerated his HD session well after which he began experiencing 10/10 epigastric abdominal pain with bilious emesis x 4 ( last .episode .witnessed in ER) . Patient reports improvement in pain after vomiting.  He denies chest pain, shortness of breath, cough, fever, chills, diarrhea, dysuria, hematuria, flank pain, and any additional complaints at this time.  No recent travel or sick contacts.            History as above, asked to see this patient who is from home and comes in with abdominal pain, nausea , vomiting x 4, he was found to have spontaneous bacterial peritonitis here with a very high cell count but negative peritoneal cultures. He is on Rocephin and doing better overall with less abdominal pain, he has no more nausea or vomiting. He has no fevers but has a elevated WBC count. He has no more nausea or vomiting.          PAST MEDICAL & SURGICAL HISTORY:  Type 2 diabetes mellitus      Hypertension      End stage renal disease  started HD 2/2019 T, Th, Sat via right chest permacath      Bone spur  right shoulder- hx of - sx done      Anemia      Injury of right wrist  hx of at age 14      History of hyperkalemia  before HD- K-6.2 - to repeat in am of sx, pt had HD today      S/P arthroscopy of right shoulder  2010      History of vascular access device  right chest permacath - 2/2019      H/O right wrist surgery  tendons repair - at age 14      AV fistula          No Known Allergies      Meds:  acetaminophen     Tablet .. 650 milliGRAM(s) Oral every 6 hours PRN  atorvastatin 10 milliGRAM(s) Oral at bedtime  bisacodyl 5 milliGRAM(s) Oral daily  carvedilol 12.5 milliGRAM(s) Oral every 12 hours  cefTRIAXone   IVPB      cefTRIAXone   IVPB 2000 milliGRAM(s) IV Intermittent every 24 hours  chlorhexidine 2% Cloths 1 Application(s) Topical daily  dextrose 50% Injectable 25 Gram(s) IV Push once  dextrose Oral Gel 15 Gram(s) Oral once PRN  furosemide    Tablet 20 milliGRAM(s) Oral daily  heparin   Injectable 5000 Unit(s) SubCutaneous every 8 hours  HYDROmorphone  Injectable 0.2 milliGRAM(s) IV Push every 4 hours PRN  insulin lispro (ADMELOG) corrective regimen sliding scale   SubCutaneous three times a day before meals  insulin lispro (ADMELOG) corrective regimen sliding scale   SubCutaneous at bedtime  mupirocin 2% Ointment 1 Application(s) Topical two times a day  NIFEdipine XL 90 milliGRAM(s) Oral daily  pantoprazole    Tablet 40 milliGRAM(s) Oral before breakfast  polyethylene glycol 3350 17 Gram(s) Oral daily  senna 2 Tablet(s) Oral at bedtime  traZODone 100 milliGRAM(s) Oral at bedtime      SOCIAL HISTORY:  Smoker:  YES   ETOH use:  no  Ilicit Drug use:  no    FAMILY HISTORY:  FH: hypertension  mother, father- alive    FH: type 2 diabetes  mother, father- alive        VITALS:  Vital Signs Last 24 Hrs  T(C): 36.8 (22 Nov 2024 11:57), Max: 37.2 (22 Nov 2024 05:23)  T(F): 98.3 (22 Nov 2024 11:57), Max: 98.9 (22 Nov 2024 05:23)  HR: 75 (22 Nov 2024 11:57) (73 - 77)  BP: 122/62 (22 Nov 2024 11:57) (122/62 - 129/60)  BP(mean): 71 (21 Nov 2024 21:37) (71 - 71)  RR: 15 (22 Nov 2024 11:57) (15 - 18)  SpO2: 94% (22 Nov 2024 11:57) (93% - 95%)    Parameters below as of 22 Nov 2024 11:57  Patient On (Oxygen Delivery Method): room air        LABS/DIAGNOSTIC TESTS:                          10.2   7.70  )-----------( 167      ( 22 Nov 2024 16:45 )             29.6     WBC Count: 7.70 K/uL (11-22 @ 16:45)  WBC Count: 14.81 K/uL (11-20 @ 10:00)      11-22    134[L]  |  103  |  74[H]  ----------------------------<  104[H]  4.6   |  23  |  8.43[H]    Ca    9.1      22 Nov 2024 16:45  Protein Total, Fluid: 5.0:    Lactate Dehydrogenase, Fluid: 258:    Glucose, Fluid: 131:    Albumin, Fluid: 2.7:  Fluid Type: Peritoneal fl  Body Fluid Appearance: Slightly Cloudy  Color - Body Fluid: Yellow  Total Nucleated Cell Count, Body Fluid: 3780: Reference Ranges:    Total RBC Count: 215 /uL  Neutrophils-Body Fluid: 87 %  BF Lymphocytes: 4 %  Monocyte/Macrophage Count - Body Fluid: 3 %  Mesothelial Cells - Body Fluid: 6 %  Comment - Fluids: Differential is based on 100 cells counted after cytocentrifugationPhos  3.1     11-22  Mg     2.2     11-22    TPro  7.0  /  Alb  2.7[L]  /  TBili  0.5  /  DBili  x   /  AST  16  /  ALT  17  /  AlkPhos  89  11-22            LIVER FUNCTIONS - ( 22 Nov 2024 16:45 )  Alb: 2.7 g/dL / Pro: 7.0 g/dL / ALK PHOS: 89 U/L / ALT: 17 U/L DA / AST: 16 U/L / GGT: x                 LACTATE:    ABG -     CULTURES:   Ascites Fl  11-19 @ 15:33   No growth  --    polymorphonuclear leukocytes seen  No organisms seen  by cytocentrifuge      .Blood BLOOD  11-18 @ 02:30   No growth at 72 Hours  --  --      .Blood BLOOD  11-18 @ 02:20   No growth at 72 Hours  --  --          RADIOLOGY:< from: US Abdomen Limited (11.22.24 @ 10:46) >  ACC: [id number] EXAM:  US ABDOMEN LIMITED   ORDERED BY: IDALIA ZIMMER     PROCEDURE DATE:  11/22/2024          INTERPRETATION:  CLINICAL INFORMATION: Ascites    COMPARISON: None available.    TECHNIQUE: Limited ultrasound of the abdomen to evaluate for ascites.    Findings: Ascites in the right upper quadrant, left upper quadrant and   the left lower quadrant abdomen with the largest ascites pocket in the   left lower quadrant abdomen measuring 19.2 x 11.6 x 15.2 cm.    IMPRESSION:  Ascites as above.    --- End of Report ---            REBECCA WILDER MD; Attending Radiologist  This document has been electronically signed. Nov 22 2024 12:26PM    < end of copied text >  ----------------------------------------------------------------------------------------------------------------  ACC: [id number] EXAM:  CT ABDOMEN AND PELVIS   ORDERED BY:  YUSRA REEVES     PROCEDURE DATE:  11/18/2024          INTERPRETATION:  CLINICAL INFORMATION: Abdominal pain. Bilious emesis.   End-stage renal disease on hemodialysis.    COMPARISON: 10/29/2022 PM.    CONTRAST/COMPLICATIONS:  IV Contrast: NONE  Oral Contrast: NONE      PROCEDURE:  CT of the Abdomen and Pelvis was performed.  Sagittal and coronal reformats were performed.    FINDINGS:  LOWER CHEST: There is bilateral gynecomastia. The heart is borderline in   size. There is trace pericardial effusion. Coronary, valvular and aortic   calcifications are noted. There is a small hiatal hernia. Impacted airway   focal bronchiectasis of the left lower lobe is appreciated. This appears   worsened when compared to 10/29/2022..    LIVER: Within normal limits.  BILE DUCTS: Normal caliber.  GALLBLADDER: The gallbladder is distended..  SPLEEN: Calcified splenic granulomata and splenic vascular calcifications   are appreciated..  PANCREAS: Within normal limits.  ADRENALS: Within normal limits.  KIDNEYS/URETERS: The kidneys are atrophic. Calcifications of this   bilateral kidneys are likely vascular in nature. There is a left renal   cyst. There is no urinary tract obstruction.    BLADDER: Minimally distended though appears diffusely thick-walled.  REPRODUCTIVE ORGANS: The prostate gland is partially visualized.    BOWEL: No bowel obstruction. Appendix is normal. There is colonic   diverticulosis. There is a small hiatal hernia.  PERITONEUM/RETROPERITONEUM: Moderate to large volume ascites. Extra   peritoneal fat is seen herniating into the left lateral canal.  VESSELS: Extensive atherosclerotic changes.  LYMPH NODES: No lymphadenopathy.  ABDOMINAL WALL: Postsurgical changes are appreciated, compatible with   right inguinal and anterior abdominal wall herniorrhaphy. There is   generalized soft tissue edema superficial subcutaneous soft tissues.  BONES: Degenerative changes..    IMPRESSION:  Moderate to large volume ascites, and generalized anasarca.    Trace pericardial effusion.    Apparent wall thickening of the urinary bladder, likely related to its   underdistended nature. Cystitis may appear similar. Correlation with   urinalysis.    Small hiatal hernia.        --- End of Report ---            LUIZA IVAN M.D., Attending Radiologist  This document has been electronically signed. Nov 18 2024  8:02PM    < end of copied text >        ROS  [  ] UNABLE TO ELICIT

## 2024-11-22 NOTE — PROGRESS NOTE ADULT - PROBLEM SELECTOR PLAN 9
h/o DM not on any home meds for many years   controlled by diet   will hold off on any IP treatment and monitor CMP daily
h/o DM not on any home meds for many years   controlled by diet   ISS

## 2024-11-22 NOTE — PROGRESS NOTE ADULT - SUBJECTIVE AND OBJECTIVE BOX
NEPHROLOGY MEDICAL CARE, [facility] - Dr. Domenic Griffiths/ Dr. Bishnu Amador/ Dr. Fili Smiley/ Dr. Naomie Crowder    Date of Service: 11-22-24    Patient was seen and examined at bedside.    CC: patient is okay and NAD    Vital Signs Last 24 Hrs  T(C): 37.2 (22 Nov 2024 05:23), Max: 37.2 (22 Nov 2024 05:23)  T(F): 98.9 (22 Nov 2024 05:23), Max: 98.9 (22 Nov 2024 05:23)  HR: 77 (22 Nov 2024 05:23) (71 - 77)  BP: 124/61 (22 Nov 2024 05:23) (111/64 - 129/60)  BP(mean): 71 (21 Nov 2024 21:37) (69 - 71)  RR: 17 (22 Nov 2024 05:23) (17 - 18)  SpO2: 93% (22 Nov 2024 05:23) (93% - 95%)    Parameters below as of 22 Nov 2024 05:23  Patient On (Oxygen Delivery Method): room air          PHYSICAL EXAM:  General: No acute respiratory distress.  Eyes: conjunctiva and sclera clear  ENMT: Atraumatic, Normocephalic, supple  Respiratory:   Bilaterally poor lungs; No rales, rhonchi, wheezing  Cardiovascular: S1S2+; no m/r/g  Gastrointestinal: Soft, Non-tender, distended; Bowel sounds present  Neuro:  Awake, Alert & Oriented X3, No focal deficits present.   Ext:  2+ pedal edema, No Cyanosis  Skin: No rashes  Dialysis Access::  Left upper arm AVF thrill/bruit+     MEDICATIONS:  MEDICATIONS  (STANDING):  atorvastatin 10 milliGRAM(s) Oral at bedtime  bisacodyl 5 milliGRAM(s) Oral daily  carvedilol 12.5 milliGRAM(s) Oral every 12 hours  cefTRIAXone   IVPB      cefTRIAXone   IVPB 2000 milliGRAM(s) IV Intermittent every 24 hours  chlorhexidine 2% Cloths 1 Application(s) Topical daily  dextrose 50% Injectable 25 Gram(s) IV Push once  furosemide    Tablet 20 milliGRAM(s) Oral daily  heparin   Injectable 5000 Unit(s) SubCutaneous every 8 hours  insulin lispro (ADMELOG) corrective regimen sliding scale   SubCutaneous three times a day before meals  insulin lispro (ADMELOG) corrective regimen sliding scale   SubCutaneous at bedtime  mupirocin 2% Ointment 1 Application(s) Topical two times a day  NIFEdipine XL 90 milliGRAM(s) Oral daily  pantoprazole    Tablet 40 milliGRAM(s) Oral before breakfast  polyethylene glycol 3350 17 Gram(s) Oral daily  senna 2 Tablet(s) Oral at bedtime  traZODone 100 milliGRAM(s) Oral at bedtime    MEDICATIONS  (PRN):  acetaminophen     Tablet .. 650 milliGRAM(s) Oral every 6 hours PRN Temp greater or equal to 38C (100.4F), Mild Pain (1 - 3)  dextrose Oral Gel 15 Gram(s) Oral once PRN Blood Glucose LESS THAN 70 milliGRAM(s)/deciliter  HYDROmorphone  Injectable 0.2 milliGRAM(s) IV Push every 4 hours PRN Pain          LABS:                        10.9   14.81 )-----------( 149      ( 20 Nov 2024 10:00 )             31.6     11-20    130[L]  |  98  |  68[H]  ----------------------------<  196[H]  5.4[H]   |  26  |  7.73[H]    Ca    8.9      20 Nov 2024 10:00    TPro  7.0  /  Alb  2.9[L]  /  TBili  0.6  /  DBili  x   /  AST  15  /  ALT  17  /  AlkPhos  76  11-20      Urinalysis Basic - ( 20 Nov 2024 10:00 )    Color: x / Appearance: x / SG: x / pH: x  Gluc: 196 mg/dL / Ketone: x  / Bili: x / Urobili: x   Blood: x / Protein: x / Nitrite: x   Leuk Esterase: x / RBC: x / WBC x   Sq Epi: x / Non Sq Epi: x / Bacteria: x        Urine studies    PTH and Vit D:

## 2024-11-22 NOTE — CONSULT NOTE ADULT - GASTROINTESTINAL
soft/normal active bowel sounds/no guarding/no rigidity/no organomegaly/no masses palpable/tender/distended/ascites details…

## 2024-11-22 NOTE — PROGRESS NOTE ADULT - PROBLEM SELECTOR PLAN 8
- c/w atorvastatin 10 mg   - Dash Diet

## 2024-11-22 NOTE — PROGRESS NOTE ADULT - PROBLEM SELECTOR PLAN 5
As per chart review - Hx of cirrhosis   follows with Dr. Li at [facility] radiology  for regular  paracentesis   lasts session 11/10, next scheduled for 11/26  patient started to have chills   s/p CTX 2 g IV for SBP PPx  abdominal US- large ascites- however patient gets paracentesis regularly  no SOB
As per chart review - Hx of cirrhosis   follows with Dr. Li at [facility] radiology  for regular  paracentesis   lasts session 11/10, next scheduled for 11/26  patient started to have chills   s/p CTX 2 g IV for SBP PPx
As per chart review - Hx of cirrhosis   follows with Dr. Li at [facility] radiology  for regular  paracentesis   lasts session 11/10, next scheduled for 11/26  patient started to have chills   s/p CTX 2 g IV for SBP PPx
As per chart review - Hx of cirrhosis   follows with Dr. Li at [facility] radiology  for regular  paracentesis   lasts session 11/10, next scheduled for 11/26  patient started to have chills   s/p CTX 2 g IV for SBP PPx     [ ] consider hepatology consult in AM

## 2024-11-22 NOTE — CONSULT NOTE ADULT - NEGATIVE NEUROLOGICAL SYMPTOMS
no headache/no confusion
no focal seizures/no syncope/no tremors/no vertigo/no loss of sensation/no headache

## 2024-11-22 NOTE — PROGRESS NOTE ADULT - ASSESSMENT
1. Abdominal pain  2. Ascites  3. SBP  4. S/p therapeutic and diagnostic paracentesis  5. Peptic ulcer disease less likely  6. Anemia    Suggestions:    1. Continue antibiotic   2. Diet as tolerated  3. Protonix 40mg daily  4. Avoid NSAID  5. Monitor H/H  6. Transfuse PRBC as needed  7. DVT prophylaxis

## 2024-11-22 NOTE — CONSULT NOTE ADULT - PROVIDER SPECIALTY LIST ADULT
April 30, 2024         No Recipients      Patient: Alfredo Arroyo   YOB: 1955   Date of Visit: 4/30/2024       Dear Dr. Woodward Recipients:    Thank you for referring Alfredo Arroyo to me for evaluation. Below are my notes for this visit with him.    If you have questions, please do not hesitate to call me. I look forward to following your patient along with you.      Sincerely,        Guicho Armenta MD        CC:   No Recipients   
Gastroenterology
Infectious Disease
Nephrology
Mustarde Flap Text: The defect edges were debeveled with a #15 scalpel blade.  Given the size, depth and location of the defect and the proximity to free margins a Mustarde flap was deemed most appropriate.  Using a sterile surgical marker, an appropriate flap was drawn incorporating the defect. The area thus outlined was incised with a #15 scalpel blade.  The skin margins were undermined to an appropriate distance in all directions utilizing iris scissors.

## 2024-11-22 NOTE — PROGRESS NOTE ADULT - SUBJECTIVE AND OBJECTIVE BOX
[   ] ICU                                          [   ] CCU                                      [ X  ] Medical Floor    Patient is a 54 year old male with intractable abdominal pain. GI consulted to evaluate.        54 year old male with past medical history significant for Hyperlipidemia, HTN, DM, CAD, CHF, CHF, ESRD on HD ( L UE AVF)  MWF( follows with Dr. Griffiths) , ascites( follows with Dr. Li at [facility])  last drained 10/10/2024 presented  to the emergency room with one day history of progressively worsening non radiating 10/10 intensity crampy periumbilical abdominal pain associated with nausea and a few episodes of non bloody vomiting. Patient denies hematemesis, hematochezia, melena, fever, chills, chest pain, SOB, cough, hematuria, dysuria or diarrhea.     Patient is comfortable. No new complaints reported, No nausea, vomiting, hematemesis, hematochezia, melena, fever, chills, chest pain, SOB, cough or diarrhea reported.          PAST MEDICAL HISTORY    DM    Hypertension    Hyperlipidemia    ESRD on HD    Bone spur    Anemia    Injury of right wrist          PAST SURGICAL HISTORY    Arthroscopy of right shoulder    Vascular access device    Right wrist surgery    AV fistula        Allergies    No Known Allergies    Intolerances  None         MEDICATIONS  (STANDING):  atorvastatin 10 milliGRAM(s) Oral at bedtime  carvedilol 12.5 milliGRAM(s) Oral every 12 hours  heparin   Injectable 5000 Unit(s) SubCutaneous every 8 hours  NIFEdipine XL 90 milliGRAM(s) Oral daily  pantoprazole    Tablet 40 milliGRAM(s) Oral before breakfast  traZODone 100 milliGRAM(s) Oral at bedtime    MEDICATIONS  (PRN):  acetaminophen     Tablet .. 650 milliGRAM(s) Oral every 6 hours PRN Temp greater or equal to 38C (100.4F), Mild Pain (1 - 3)  HYDROmorphone  Injectable 0.2 milliGRAM(s) IV Push every 4 hours PRN Pain      SOCIAL HISTORY  Advanced Directives:       [ X ] Full Code       [  ] DNR  Marital Status:         [  ] M      [ X ] S      [  ] D       [  ] W  Children:       [ X ] Yes      [  ] No  Occupation:        [  ] Employed       [ X ] Unemployed       [  ] Retired  Diet:       [ X ] Regular       [  ] PEG feeding          [  ] NG tube feeding  Drug Use:           [X  ] Patient denied          [  ] Yes  Alcohol:           [  ] No             [ X ] Yes (socially)         [  ] Yes (chronic)  Tobacco:           [  ] Yes           [ X ] No    FAMILY HISTORY  [ X ] Heart Disease            [ X ] Diabetes             [ X ] HTN             [  ] Colon Cancer             [  ] Stomach Cancer              [  ] Pancreatic Cancer        VITALS   Vital Signs Last 24 Hrs  T(C): 36.8 (11-22-24 @ 11:57), Max: 37.2 (11-22-24 @ 05:23)  T(F): 98.3 (11-22-24 @ 11:57), Max: 98.9 (11-22-24 @ 05:23)  HR: 75 (11-22-24 @ 11:57) (71 - 77)  BP: 122/62 (11-22-24 @ 11:57) (111/64 - 129/60)  BP(mean): 71 (11-21-24 @ 21:37) (69 - 71)  RR: 15 (11-22-24 @ 11:57) (15 - 18)  SpO2: 94% (11-22-24 @ 11:57) (93% - 95%)      MEDICATIONS  (STANDING):  atorvastatin 10 milliGRAM(s) Oral at bedtime  bisacodyl 5 milliGRAM(s) Oral daily  carvedilol 12.5 milliGRAM(s) Oral every 12 hours  cefTRIAXone   IVPB      cefTRIAXone   IVPB 2000 milliGRAM(s) IV Intermittent every 24 hours  chlorhexidine 2% Cloths 1 Application(s) Topical daily  dextrose 50% Injectable 25 Gram(s) IV Push once  furosemide    Tablet 20 milliGRAM(s) Oral daily  heparin   Injectable 5000 Unit(s) SubCutaneous every 8 hours  insulin lispro (ADMELOG) corrective regimen sliding scale   SubCutaneous three times a day before meals  insulin lispro (ADMELOG) corrective regimen sliding scale   SubCutaneous at bedtime  mupirocin 2% Ointment 1 Application(s) Topical two times a day  NIFEdipine XL 90 milliGRAM(s) Oral daily  pantoprazole    Tablet 40 milliGRAM(s) Oral before breakfast  polyethylene glycol 3350 17 Gram(s) Oral daily  senna 2 Tablet(s) Oral at bedtime  traZODone 100 milliGRAM(s) Oral at bedtime    MEDICATIONS  (PRN):  acetaminophen     Tablet .. 650 milliGRAM(s) Oral every 6 hours PRN Temp greater or equal to 38C (100.4F), Mild Pain (1 - 3)  dextrose Oral Gel 15 Gram(s) Oral once PRN Blood Glucose LESS THAN 70 milliGRAM(s)/deciliter  HYDROmorphone  Injectable 0.2 milliGRAM(s) IV Push every 4 hours PRN Pain

## 2024-11-22 NOTE — PROGRESS NOTE ADULT - ASSESSMENT
1. ESRD on HD (M/W/F)  -plan for HD day with 1.5kg.  HD orders were written and discussed with dialysis nurse.   -Hemodialysis Renal Diet and Fluid restriction to 1L/day  -Adjust meds to eGFR and avoid IV Gadolinium contrast,NSAIDs, and phosphate enema.  -Monitor Electrolytes daily.  2. HTN:   -bp is acceptable at present.  -continue bp meds  -titrate bp meds to keep sbp >110 and < 130  3. Anemia of ESRD:  -order Epogen if hb <10  -F/u CBC daily  -transfuse if HB < 7.0.  4. Mineral Bone Disease:  -continue phoslo tid.   -monitor phos  5. Abd pain due to SBP. Ascites due to heart failure.   -s/p paracentesis 4L on 11/19  -SBP with diagnostic tap.  -on rocephin and f/u cultures

## 2024-11-22 NOTE — PROGRESS NOTE ADULT - SUBJECTIVE AND OBJECTIVE BOX
NP Note discussed with  Primary Attending    Patient is a 54y old  Male who presents with a chief complaint of intractable abdominal pain (22 Nov 2024 12:10)      INTERVAL HPI/OVERNIGHT EVENTS: no new complaints    MEDICATIONS  (STANDING):  atorvastatin 10 milliGRAM(s) Oral at bedtime  bisacodyl 5 milliGRAM(s) Oral daily  carvedilol 12.5 milliGRAM(s) Oral every 12 hours  cefTRIAXone   IVPB      cefTRIAXone   IVPB 2000 milliGRAM(s) IV Intermittent every 24 hours  chlorhexidine 2% Cloths 1 Application(s) Topical daily  dextrose 50% Injectable 25 Gram(s) IV Push once  furosemide    Tablet 20 milliGRAM(s) Oral daily  heparin   Injectable 5000 Unit(s) SubCutaneous every 8 hours  insulin lispro (ADMELOG) corrective regimen sliding scale   SubCutaneous three times a day before meals  insulin lispro (ADMELOG) corrective regimen sliding scale   SubCutaneous at bedtime  mupirocin 2% Ointment 1 Application(s) Topical two times a day  NIFEdipine XL 90 milliGRAM(s) Oral daily  pantoprazole    Tablet 40 milliGRAM(s) Oral before breakfast  polyethylene glycol 3350 17 Gram(s) Oral daily  senna 2 Tablet(s) Oral at bedtime  traZODone 100 milliGRAM(s) Oral at bedtime    MEDICATIONS  (PRN):  acetaminophen     Tablet .. 650 milliGRAM(s) Oral every 6 hours PRN Temp greater or equal to 38C (100.4F), Mild Pain (1 - 3)  dextrose Oral Gel 15 Gram(s) Oral once PRN Blood Glucose LESS THAN 70 milliGRAM(s)/deciliter  HYDROmorphone  Injectable 0.2 milliGRAM(s) IV Push every 4 hours PRN Pain      __________________________________________________  REVIEW OF SYSTEMS:    CONSTITUTIONAL: No fever,   EYES: no acute visual disturbances  NECK: No pain or stiffness  RESPIRATORY: No cough; No shortness of breath  CARDIOVASCULAR: No chest pain, no palpitations  GASTROINTESTINAL: No pain. No nausea or vomiting; No diarrhea   NEUROLOGICAL: No headache or numbness, no tremors  MUSCULOSKELETAL: No joint pain, no muscle pain  GENITOURINARY: no dysuria, no frequency, no hesitancy  PSYCHIATRY: no depression , no anxiety  ALL OTHER  ROS negative        Vital Signs Last 24 Hrs  T(C): 36.8 (22 Nov 2024 11:57), Max: 37.2 (22 Nov 2024 05:23)  T(F): 98.3 (22 Nov 2024 11:57), Max: 98.9 (22 Nov 2024 05:23)  HR: 75 (22 Nov 2024 11:57) (71 - 77)  BP: 122/62 (22 Nov 2024 11:57) (111/64 - 129/60)  BP(mean): 71 (21 Nov 2024 21:37) (69 - 71)  RR: 15 (22 Nov 2024 11:57) (15 - 18)  SpO2: 94% (22 Nov 2024 11:57) (93% - 95%)    Parameters below as of 22 Nov 2024 11:57  Patient On (Oxygen Delivery Method): room air        ________________________________________________  PHYSICAL EXAM:  GENERAL: NAD  HEENT: Normocephalic;  conjunctivae and sclerae clear; moist mucous membranes;   NECK : supple  CHEST/LUNG: Clear to auscultation bilaterally with good air entry   HEART: S1 S2  regular; no murmurs, gallops or rubs  ABDOMEN: Soft, Nontender, Nondistended; Bowel sounds present  EXTREMITIES: no cyanosis; no edema; no calf tenderness  SKIN: warm and dry; no rash  NERVOUS SYSTEM:  Awake and alert; Oriented  to place, person and time ; no new deficits    _________________________________________________  LABS:    CAPILLARY BLOOD GLUCOSE      POCT Blood Glucose.: 158 mg/dL (22 Nov 2024 11:38)  POCT Blood Glucose.: 155 mg/dL (22 Nov 2024 07:42)  POCT Blood Glucose.: 124 mg/dL (21 Nov 2024 21:45)  POCT Blood Glucose.: 203 mg/dL (21 Nov 2024 16:47)        RADIOLOGY & ADDITIONAL TESTS:    Imaging  Reviewed:  YES/NO    Consultant(s) Notes Reviewed:   YES/ No      Plan of care was discussed with patient and /or primary care giver; all questions and concerns were addressed

## 2024-11-22 NOTE — PROGRESS NOTE ADULT - PROBLEM SELECTOR PLAN 3
ESRD on HD  MWF   Follows with Dr. Griffiths   Last dialysis 11/18- tolerated well, 2.5  removed     -Nephro Dr. Griffiths consulted   -renal diet  -fluid restriction
ESRD on HD  MWF   Follows with Dr. Griffiths   Last dialysis 11/18- tolerated well, 2.5  removed   -Nephro Dr. Griffiths   -renal diet  -fluid restriction

## 2024-11-22 NOTE — PROGRESS NOTE ADULT - PROBLEM SELECTOR PROBLEM 1
Intractable abdominal pain
SBP (spontaneous bacterial peritonitis)
SBP (spontaneous bacterial peritonitis)
Intractable abdominal pain

## 2024-11-22 NOTE — CONSULT NOTE ADULT - RESPIRATORY
normal/clear to auscultation bilaterally/no wheezes/no rales/no rhonchi
clear to auscultation bilaterally/no wheezes/no rales/no rhonchi/no respiratory distress/no use of accessory muscles/no subcutaneous emphysema/airway patent/breath sounds equal/good air movement/respirations non-labored

## 2024-11-22 NOTE — PROGRESS NOTE ADULT - PROBLEM SELECTOR PLAN 1
s/p zofran, tylenol, pepcid , morphine   advanced diet  c/w tylenol for mild, dilaudid for severe pain PRN  c/w protonix, maalox PRN.  continue ceftriaxone 5-7 days, day 5 today  blood culture negative  peritoneal cytology cloudy with RBC
s/p zofran, tylenol, pepcid , morphine   advanced diet  c/w tylenol for mild , dilaudid for severe pain PRN  c/w protonix, maalox PRN
s/p zofran, tylenol, pepcid , morphine   advanced diet  c/w tylenol for mild, dilaudid for severe pain PRN  c/w protonix, maalox PRN.  continue ceftriaxone 5-7 days, day 4 today  blood culture negative  peritoneal cytology cloudy with RBC
s/p zofran, tylenol, pepcid , morphine     Patient reports mild improvement in pain   Patient is hungry, requesting trial of liquids    clear liquids - advance as tolerated   c/w tylenol for mild , dilaudid for severe pain PRN  c/w protonix, maalox PRN

## 2024-11-22 NOTE — CONSULT NOTE ADULT - ASSESSMENT
1. ESRD on HD (M/W/F)  -s/p HD yesterday with UF 2.0kg; plan for HD tomorrow around 1.5kg if bp tolerates.  and HD orders were written and discussed with dialysis nurse.   -Plan tomorrow for his schedule HD session.   -Hemodialysis Renal Diet and Fluid restriction to 1L/day  -Adjust meds to eGFR and avoid IV Gadolinium contrast,NSAIDs, and phosphate enema.  -Monitor Electrolytes daily.  2. HTN:   -bp is acceptable at present.  -continue bp meds  -titrate bp meds to keep sbp >110 and < 130  3. Anemia of ESRD:  -order Epogen if hb <10  -F/u CBC daily  -transfuse if HB < 7.0.  4. Mineral Bone Disease:  -continue phoslo tid.   -monitor phos  5. Abd pain r/o SBP. Ascites due to heart failure.   -s/p paracentesis 4L today  -r/o SBP with diagnostic tap. 
1. Abdominal pain  2. Ascites  3. R/o SBP  4. ? food poisoning  5. Peptic ulcer disease less likely  6. Anemia    Suggestions:    1. Consider therapeutic and diagnostic paracentesis      (check total cell count, gram stain, ascitic fluid albumin)  2. Continue antibiotics  3. Protonix 40mg daily  4. Avoid NSAID  5. Diet as tolerated  6. Monitor H/H  7. Transfuse PRBC as needed  8. DVT prophylaxis
Spontaneous Bacterial Peritonitis  Leukocytosis    Plan - Cont Rocephin 2gms iv q24hrs  will give IV abxs till Monday then switch to ceftin 250mgs po bid x 2-3 days more.

## 2024-11-22 NOTE — PROGRESS NOTE ADULT - PROBLEM SELECTOR PLAN 4
EKG shows sinus james with 1st degree AVB   as per prior EKG patient has Hx of  1 st degree AVB  currently asymptomatic
EKG shows sinus james with 1st degree AVB     as per prior EKG patient has Hx of  1 st degree AVB    currently asymptomatic  c/w monitoring
EKG shows sinus james with 1st degree AVB   as per prior EKG patient has Hx of  1 st degree AVB  currently asymptomatic
EKG shows sinus james with 1st degree AVB   as per prior EKG patient has Hx of  1 st degree AVB  currently asymptomatic

## 2024-11-22 NOTE — PROGRESS NOTE ADULT - PROBLEM SELECTOR PLAN 7
continue lisinopril 40 mg nifedipine 90 mg OD, carvedilol 12.5 mg BID
h/o HTN on lisinopril 40 mg nifedipine 90 mg OD, carvedilol 12.5 mg BID  Monitor BP  c/w home meds  with holding parameters   Lower MAP 20-25% in next 2-4 hrs
continue lisinopril 40 mg nifedipine 90 mg OD, carvedilol 12.5 mg BID
continue lisinopril 40 mg nifedipine 90 mg OD, carvedilol 12.5 mg BID

## 2024-11-22 NOTE — PROGRESS NOTE ADULT - PROBLEM SELECTOR PLAN 11
pt from home,   HD today  febrile overnight
pt from home,   HD 11/22/24  need ceftriaxone total 5-7 days
pt from home,   HD 11/20/24  need ceftriaxone total 5-7 days
pt from home,   f/u IR w/ therapeutic and diagnostic paracentesis  f/u bx  f/u BM post bowel regimen

## 2024-11-22 NOTE — PROGRESS NOTE ADULT - PROBLEM SELECTOR PROBLEM 6
Chronic combined systolic and diastolic heart failure

## 2024-11-22 NOTE — PROGRESS NOTE ADULT - PROBLEM SELECTOR PLAN 6
not in exacerbation  TTE 11/23- EF 35-40 %, G3-4 DD  CXR wet read- cardiomegaly   takes lisinopril 40 mg nifedipine 90 mg OD, carvedilol 12.5 mg BID  -daily weights, strict I&O
not in exacerbation  TTE 11/23- EF 35-40 %, G3-4 DD  CXR wet read- cardiomegaly   takes lisinopril 40 mg nifedipine 90 mg OD, carvedilol 12.5 mg BID  -daily weights, strict I&O
denies SOB  TTE 11/23- EF 35-40 %, G3-4 DD  CXR wet read- cardiomegaly     takes lisinopril 40 mg nifedipine 90 mg OD, carvedilol 12.5 mg BID    -c/w home meds    -daily weights, strict I&O
not in exacerbation  TTE 11/23- EF 35-40 %, G3-4 DD  CXR wet read- cardiomegaly   takes lisinopril 40 mg nifedipine 90 mg OD, carvedilol 12.5 mg BID  -daily weights, strict I&O

## 2024-11-22 NOTE — PROGRESS NOTE ADULT - PROBLEM SELECTOR PLAN 2
replete as needed  ESRD HD MWF
replete as needed  ESRD HD MWF
f/u  BMP, Mg, P  replete as needed  ESRD HD MWF
replete as needed  ESRD HD MWF

## 2024-11-23 LAB
GLUCOSE BLDC GLUCOMTR-MCNC: 103 MG/DL — HIGH (ref 70–99)
GLUCOSE BLDC GLUCOMTR-MCNC: 110 MG/DL — HIGH (ref 70–99)
GLUCOSE BLDC GLUCOMTR-MCNC: 149 MG/DL — HIGH (ref 70–99)
GLUCOSE BLDC GLUCOMTR-MCNC: 150 MG/DL — HIGH (ref 70–99)
NON-GYNECOLOGICAL CYTOLOGY STUDY: SIGNIFICANT CHANGE UP

## 2024-11-23 PROCEDURE — 99232 SBSQ HOSP IP/OBS MODERATE 35: CPT

## 2024-11-23 RX ADMIN — Medication 1 APPLICATION(S): at 06:27

## 2024-11-23 RX ADMIN — PANTOPRAZOLE SODIUM 40 MILLIGRAM(S): 40 TABLET, DELAYED RELEASE ORAL at 06:32

## 2024-11-23 RX ADMIN — FUROSEMIDE 20 MILLIGRAM(S): 40 TABLET ORAL at 06:32

## 2024-11-23 RX ADMIN — CARVEDILOL 12.5 MILLIGRAM(S): 25 TABLET, FILM COATED ORAL at 06:31

## 2024-11-23 RX ADMIN — Medication 90 MILLIGRAM(S): at 06:32

## 2024-11-23 RX ADMIN — Medication 10 MILLIGRAM(S): at 21:46

## 2024-11-23 RX ADMIN — TRAZODONE HYDROCHLORIDE 100 MILLIGRAM(S): 150 TABLET ORAL at 21:46

## 2024-11-23 RX ADMIN — CHLORHEXIDINE GLUCONATE 1 APPLICATION(S): 1.2 RINSE ORAL at 12:05

## 2024-11-23 RX ADMIN — Medication 100 MILLIGRAM(S): at 15:50

## 2024-11-23 RX ADMIN — CARVEDILOL 12.5 MILLIGRAM(S): 25 TABLET, FILM COATED ORAL at 18:19

## 2024-11-23 RX ADMIN — Medication 1 APPLICATION(S): at 18:19

## 2024-11-23 NOTE — PROGRESS NOTE ADULT - ASSESSMENT
54M PMH HTN, HLD, DM, ESRD on HD MWF, ascites, p/w intractable abdominal pain, N/V, found with large volume ascites and ?bladder thickening on CTAP. S/p therapeutic paracentesis. Started on CTX for SBP. Ascites fluid is c/w SBP. Pain and nausea are improved but still present.      AP:  Sepsis 2/2   SBP  Ascites  sever N/V  ESRD HD MWF  HF  HLD  DM    -discussed with GI Dr. Cervantes: plan for EGD given severe N/V intractable  -discussed with ID Dr. Lopez: c/w CTX 2g until improvement in sx (currently on day 5)  -monitor vitals and WBC count  -ascites fluid and BCx without growth  -HD per nephrology  -c/w PPI, prn zofran, prn pain meds  -rest of care as above    =================================  I independently reviewed the following tests: N/A  I discussed management with specialists and the plan of care is described above.  I ordered labs and studies: CBC, BMP  I reviewed the following labs to guide my management:                        10.2   7.70  )-----------( 167      ( 22 Nov 2024 16:45 )             29.6     11-22    134[L]  |  103  |  74[H]  ----------------------------<  104[H]  4.6   |  23  |  8.43[H]    Ca    9.1      22 Nov 2024 16:45  Phos  3.1     11-22  Mg     2.2     11-22    TPro  7.0  /  Alb  2.7[L]  /  TBili  0.5  /  DBili  x   /  AST  16  /  ALT  17  /  AlkPhos  89  11-22

## 2024-11-23 NOTE — PROGRESS NOTE ADULT - SUBJECTIVE AND OBJECTIVE BOX
[   ] ICU                                          [   ] CCU                                      [ X  ] Medical Floor    Patient is a 54 year old male with intractable abdominal pain. GI consulted to evaluate.        54 year old male with past medical history significant for Hyperlipidemia, HTN, DM, CAD, CHF, CHF, ESRD on HD ( L UE AVF)  MWF( follows with Dr. Griffiths) , ascites( follows with Dr. Li at [facility])  last drained 10/10/2024 presented  to the emergency room with one day history of progressively worsening non radiating 10/10 intensity crampy periumbilical abdominal pain associated with nausea and a few episodes of non bloody vomiting. Patient denies hematemesis, hematochezia, melena, fever, chills, chest pain, SOB, cough, hematuria, dysuria or diarrhea.     Patient is comfortable. Patient still c/o intermittent abdominal pain and vomiting. No hematemesis, hematochezia, melena, fever, chills, chest pain, SOB, cough or diarrhea reported.        PAST MEDICAL HISTORY    DM    Hypertension    Hyperlipidemia    ESRD on HD    Bone spur    Anemia    Injury of right wrist          PAST SURGICAL HISTORY    Arthroscopy of right shoulder    Vascular access device    Right wrist surgery    AV fistula        Allergies    No Known Allergies    Intolerances  None            SOCIAL HISTORY  Advanced Directives:       [ X ] Full Code       [  ] DNR  Marital Status:         [  ] M      [ X ] S      [  ] D       [  ] W  Children:       [ X ] Yes      [  ] No  Occupation:        [  ] Employed       [ X ] Unemployed       [  ] Retired  Diet:       [ X ] Regular       [  ] PEG feeding          [  ] NG tube feeding  Drug Use:           [X  ] Patient denied          [  ] Yes  Alcohol:           [  ] No             [ X ] Yes (socially)         [  ] Yes (chronic)  Tobacco:           [  ] Yes           [ X ] No    FAMILY HISTORY  [ X ] Heart Disease            [ X ] Diabetes             [ X ] HTN             [  ] Colon Cancer             [  ] Stomach Cancer              [  ] Pancreatic Cancer    VITALS    T(C): 36.6   T(F): 97.9   HR: 77   BP:  145/66   RR:  16  SpO2: 99%      MEDICATIONS  (STANDING):  atorvastatin 10 milliGRAM(s) Oral at bedtime  bisacodyl 5 milliGRAM(s) Oral daily  carvedilol 12.5 milliGRAM(s) Oral every 12 hours  cefTRIAXone   IVPB      cefTRIAXone   IVPB 2000 milliGRAM(s) IV Intermittent every 24 hours  chlorhexidine 2% Cloths 1 Application(s) Topical daily  dextrose 50% Injectable 25 Gram(s) IV Push once  furosemide    Tablet 20 milliGRAM(s) Oral daily  heparin   Injectable 5000 Unit(s) SubCutaneous every 8 hours  insulin lispro (ADMELOG) corrective regimen sliding scale   SubCutaneous three times a day before meals  insulin lispro (ADMELOG) corrective regimen sliding scale   SubCutaneous at bedtime  mupirocin 2% Ointment 1 Application(s) Topical two times a day  NIFEdipine XL 90 milliGRAM(s) Oral daily  pantoprazole    Tablet 40 milliGRAM(s) Oral before breakfast  polyethylene glycol 3350 17 Gram(s) Oral daily  senna 2 Tablet(s) Oral at bedtime  traZODone 100 milliGRAM(s) Oral at bedtime    MEDICATIONS  (PRN):  acetaminophen     Tablet .. 650 milliGRAM(s) Oral every 6 hours PRN Temp greater or equal to 38C (100.4F), Mild Pain (1 - 3)  dextrose Oral Gel 15 Gram(s) Oral once PRN Blood Glucose LESS THAN 70 milliGRAM(s)/deciliter  HYDROmorphone  Injectable 0.2 milliGRAM(s) IV Push every 4 hours PRN Pain                            10.2   7.70  )-----------( 167      ( 22 Nov 2024 16:45 )             29.6       11-22    134[L]  |  103  |  74[H]  ----------------------------<  104[H]  4.6   |  23  |  8.43[H]    Ca    9.1      22 Nov 2024 16:45  Phos  3.1     11-22  Mg     2.2     11-22    TPro  7.0  /  Alb  2.7[L]  /  TBili  0.5  /  DBili  x   /  AST  16  /  ALT  17  /  AlkPhos  89  11-22

## 2024-11-23 NOTE — PROGRESS NOTE ADULT - SUBJECTIVE AND OBJECTIVE BOX
NEPHROLOGY MEDICAL CARE, [facility] - Dr. Domenic Griffiths/ Dr. Bishnu Amador/ Dr. Fili Smiley/ Dr. Naomie Crowder    Date of Service: 11-23-24    Patient was seen and examined at bedside.    CC: patient has some n/v intermittently.     Vital Signs Last 24 Hrs  T(C): 36.6 (23 Nov 2024 06:31), Max: 36.7 (22 Nov 2024 21:31)  T(F): 97.9 (23 Nov 2024 06:31), Max: 98.1 (22 Nov 2024 21:31)  HR: 77 (23 Nov 2024 06:31) (77 - 79)  BP: 145/66 (23 Nov 2024 06:31) (145/66 - 147/64)  BP(mean): --  RR: 16 (23 Nov 2024 06:31) (16 - 18)  SpO2: 99% (23 Nov 2024 06:31) (99% - 99%)    Parameters below as of 23 Nov 2024 06:31  Patient On (Oxygen Delivery Method): room air        11-22 @ 07:01  -  11-23 @ 07:00  --------------------------------------------------------  IN: 600 mL / OUT: 2100 mL / NET: -1500 mL        PHYSICAL EXAM:  General: No acute respiratory distress.  Eyes: conjunctiva and sclera clear  ENMT: Atraumatic, Normocephalic, supple  Respiratory:   Bilaterally poor lungs; No rales, rhonchi, wheezing  Cardiovascular: S1S2+; no m/r/g  Gastrointestinal: Soft, Non-tender, distended; Bowel sounds present  Neuro:  Awake, Alert & Oriented X3, No focal deficits present.   Ext:  2+ pedal edema, No Cyanosis  Skin: No rashes  Dialysis Access::  Left upper arm AVF thrill/bruit+     MEDICATIONS:  MEDICATIONS  (STANDING):  atorvastatin 10 milliGRAM(s) Oral at bedtime  bisacodyl 5 milliGRAM(s) Oral daily  carvedilol 12.5 milliGRAM(s) Oral every 12 hours  cefTRIAXone   IVPB      cefTRIAXone   IVPB 2000 milliGRAM(s) IV Intermittent every 24 hours  chlorhexidine 2% Cloths 1 Application(s) Topical daily  dextrose 50% Injectable 25 Gram(s) IV Push once  furosemide    Tablet 20 milliGRAM(s) Oral daily  heparin   Injectable 5000 Unit(s) SubCutaneous every 8 hours  insulin lispro (ADMELOG) corrective regimen sliding scale   SubCutaneous three times a day before meals  insulin lispro (ADMELOG) corrective regimen sliding scale   SubCutaneous at bedtime  mupirocin 2% Ointment 1 Application(s) Topical two times a day  NIFEdipine XL 90 milliGRAM(s) Oral daily  pantoprazole    Tablet 40 milliGRAM(s) Oral before breakfast  polyethylene glycol 3350 17 Gram(s) Oral daily  senna 2 Tablet(s) Oral at bedtime  traZODone 100 milliGRAM(s) Oral at bedtime    MEDICATIONS  (PRN):  acetaminophen     Tablet .. 650 milliGRAM(s) Oral every 6 hours PRN Temp greater or equal to 38C (100.4F), Mild Pain (1 - 3)  dextrose Oral Gel 15 Gram(s) Oral once PRN Blood Glucose LESS THAN 70 milliGRAM(s)/deciliter  HYDROmorphone  Injectable 0.2 milliGRAM(s) IV Push every 4 hours PRN Pain          LABS:                        10.2   7.70  )-----------( 167      ( 22 Nov 2024 16:45 )             29.6     11-22    134[L]  |  103  |  74[H]  ----------------------------<  104[H]  4.6   |  23  |  8.43[H]    Ca    9.1      22 Nov 2024 16:45  Phos  3.1     11-22  Mg     2.2     11-22    TPro  7.0  /  Alb  2.7[L]  /  TBili  0.5  /  DBili  x   /  AST  16  /  ALT  17  /  AlkPhos  89  11-22      Urinalysis Basic - ( 22 Nov 2024 16:45 )    Color: x / Appearance: x / SG: x / pH: x  Gluc: 104 mg/dL / Ketone: x  / Bili: x / Urobili: x   Blood: x / Protein: x / Nitrite: x   Leuk Esterase: x / RBC: x / WBC x   Sq Epi: x / Non Sq Epi: x / Bacteria: x      Magnesium: 2.2 mg/dL (11-22 @ 16:45)  Phosphorus: 3.1 mg/dL (11-22 @ 16:45)    Urine studies    PTH and Vit D:

## 2024-11-23 NOTE — PROGRESS NOTE ADULT - ASSESSMENT
1. Abdominal pain with vomiting  2. Ascites  3. SBP  4. S/p therapeutic and diagnostic paracentesis  5. Peptic ulcer disease less likely  6. Anemia    Suggestions:    1. Continue antibiotic   2. Diet as tolerated  3. Protonix 40mg daily  4. Avoid NSAID  5. Monitor H/H  6. Transfuse PRBC as needed  7. EGD  8. DVT prophylaxis

## 2024-11-23 NOTE — PROGRESS NOTE ADULT - ASSESSMENT
1. ESRD on HD (M/W/F)  -s/p HD yesterday with 1.5kg.  Plan for next HD on Sunday as per Holiday Schedule. HD orders were written and discussed with dialysis nurse.   -Hemodialysis Renal Diet and Fluid restriction to 1L/day  -Adjust meds to eGFR and avoid IV Gadolinium contrast,NSAIDs, and phosphate enema.  -Monitor Electrolytes daily.  2. HTN:   -bp is acceptable at present.  -continue bp meds  -titrate bp meds to keep sbp >110 and < 130  3. Anemia of ESRD:  -order Epogen if hb <10  -F/u CBC daily  -transfuse if HB < 7.0.  4. Mineral Bone Disease:  -continue phoslo tid.   -monitor phos  5. Abd pain due to SBP. Ascites due to heart failure.   -s/p paracentesis 4L on 11/19  -SBP with diagnostic tap.  -on rocephin and f/u cultures

## 2024-11-23 NOTE — PROGRESS NOTE ADULT - SUBJECTIVE AND OBJECTIVE BOX
Interval of present illness: No acute events overnight. Pt seen at bedside. Complains of abdominal pain still 5/10 and vomiting right after eating    O:  Vital Signs Last 24 Hrs  T(C): 36.6 (23 Nov 2024 06:31), Max: 36.7 (22 Nov 2024 21:31)  T(F): 97.9 (23 Nov 2024 06:31), Max: 98.1 (22 Nov 2024 21:31)  HR: 77 (23 Nov 2024 06:31) (77 - 79)  BP: 145/66 (23 Nov 2024 06:31) (145/66 - 147/64)  BP(mean): --  RR: 16 (23 Nov 2024 06:31) (16 - 18)  SpO2: 99% (23 Nov 2024 06:31) (99% - 99%)    Parameters below as of 23 Nov 2024 06:31  Patient On (Oxygen Delivery Method): room air        Gen: NAD  Neuro: alert, answering qs appropriately, moves all extremities  HEENT: anicteric, moist oral mucosa  Neck: supple  Cards: no murmurs appreciated  Pulm: good inspiratory effort, breathing comfortably  Abd: soft, distended, diffusely tender+  Ext: no edema  Skin: warm, dry      acetaminophen     Tablet .. 650 milliGRAM(s) Oral every 6 hours PRN  atorvastatin 10 milliGRAM(s) Oral at bedtime  bisacodyl 5 milliGRAM(s) Oral daily  carvedilol 12.5 milliGRAM(s) Oral every 12 hours  cefTRIAXone   IVPB      cefTRIAXone   IVPB 2000 milliGRAM(s) IV Intermittent every 24 hours  chlorhexidine 2% Cloths 1 Application(s) Topical daily  dextrose 50% Injectable 25 Gram(s) IV Push once  dextrose Oral Gel 15 Gram(s) Oral once PRN  furosemide    Tablet 20 milliGRAM(s) Oral daily  heparin   Injectable 5000 Unit(s) SubCutaneous every 8 hours  HYDROmorphone  Injectable 0.2 milliGRAM(s) IV Push every 4 hours PRN  insulin lispro (ADMELOG) corrective regimen sliding scale   SubCutaneous three times a day before meals  insulin lispro (ADMELOG) corrective regimen sliding scale   SubCutaneous at bedtime  mupirocin 2% Ointment 1 Application(s) Topical two times a day  NIFEdipine XL 90 milliGRAM(s) Oral daily  pantoprazole    Tablet 40 milliGRAM(s) Oral before breakfast  polyethylene glycol 3350 17 Gram(s) Oral daily  senna 2 Tablet(s) Oral at bedtime  traZODone 100 milliGRAM(s) Oral at bedtime                            10.2   7.70  )-----------( 167      ( 22 Nov 2024 16:45 )             29.6       11-22    134[L]  |  103  |  74[H]  ----------------------------<  104[H]  4.6   |  23  |  8.43[H]    Ca    9.1      22 Nov 2024 16:45  Phos  3.1     11-22  Mg     2.2     11-22    TPro  7.0  /  Alb  2.7[L]  /  TBili  0.5  /  DBili  x   /  AST  16  /  ALT  17  /  AlkPhos  89  11-22

## 2024-11-24 LAB
CULTURE RESULTS: SIGNIFICANT CHANGE UP
GLUCOSE BLDC GLUCOMTR-MCNC: 115 MG/DL — HIGH (ref 70–99)
GLUCOSE BLDC GLUCOMTR-MCNC: 132 MG/DL — HIGH (ref 70–99)
GLUCOSE BLDC GLUCOMTR-MCNC: 135 MG/DL — HIGH (ref 70–99)
GLUCOSE BLDC GLUCOMTR-MCNC: 98 MG/DL — SIGNIFICANT CHANGE UP (ref 70–99)
SPECIMEN SOURCE: SIGNIFICANT CHANGE UP

## 2024-11-24 PROCEDURE — 99233 SBSQ HOSP IP/OBS HIGH 50: CPT

## 2024-11-24 RX ORDER — ONDANSETRON HYDROCHLORIDE 4 MG/1
4 TABLET, FILM COATED ORAL EVERY 8 HOURS
Refills: 0 | Status: DISCONTINUED | OUTPATIENT
Start: 2024-11-24 | End: 2024-11-25

## 2024-11-24 RX ADMIN — Medication 5 MILLIGRAM(S): at 13:53

## 2024-11-24 RX ADMIN — Medication 1 APPLICATION(S): at 08:28

## 2024-11-24 RX ADMIN — CHLORHEXIDINE GLUCONATE 1 APPLICATION(S): 1.2 RINSE ORAL at 15:47

## 2024-11-24 RX ADMIN — Medication 90 MILLIGRAM(S): at 08:27

## 2024-11-24 RX ADMIN — Medication 1 APPLICATION(S): at 18:12

## 2024-11-24 RX ADMIN — FUROSEMIDE 20 MILLIGRAM(S): 40 TABLET ORAL at 08:27

## 2024-11-24 RX ADMIN — Medication 10 MILLIGRAM(S): at 21:46

## 2024-11-24 RX ADMIN — CARVEDILOL 12.5 MILLIGRAM(S): 25 TABLET, FILM COATED ORAL at 08:27

## 2024-11-24 RX ADMIN — POLYETHYLENE GLYCOL 3350 17 GRAM(S): 17 POWDER, FOR SOLUTION ORAL at 13:53

## 2024-11-24 RX ADMIN — ONDANSETRON HYDROCHLORIDE 4 MILLIGRAM(S): 4 TABLET, FILM COATED ORAL at 16:54

## 2024-11-24 RX ADMIN — Medication 100 MILLIGRAM(S): at 15:47

## 2024-11-24 RX ADMIN — TRAZODONE HYDROCHLORIDE 100 MILLIGRAM(S): 150 TABLET ORAL at 22:56

## 2024-11-24 NOTE — PROGRESS NOTE ADULT - ASSESSMENT
54M PMH HTN, HLD, DM, ESRD on HD MWF, ascites, p/w intractable abdominal pain, N/V, found with large volume ascites and ?bladder thickening on CTAP. S/p therapeutic paracentesis. Started on CTX for SBP. Ascites fluid is c/w SBP. Pain and nausea are improved but still present.      AP:  Sepsis 2/2   SBP  Ascites  sever N/V  ESRD HD MWF  HF  HLD  DM    -discussed with GI Dr. Cervantes: plan for EGD given severe N/V intractable, NPO mn  -discussed with ID Dr. Lopez: c/w CTX 2g until improvement in sx (currently on day 6) - can switch to po ceftin 250 bid after dc  -monitor vitals and WBC count  -ascites fluid and BCx without growth  -HD per nephrology  -c/w PPI, prn zofran, prn pain meds  -rest of care as above    =================================  I independently reviewed the following tests: N/A  I discussed management with specialists and the plan of care is described above.  I ordered labs and studies: CBC, BMP  I reviewed the following labs to guide my management:                        10.2   7.70  )-----------( 167      ( 22 Nov 2024 16:45 )             29.6     11-22    134[L]  |  103  |  74[H]  ----------------------------<  104[H]  4.6   |  23  |  8.43[H]    Ca    9.1      22 Nov 2024 16:45  Phos  3.1     11-22  Mg     2.2     11-22    TPro  7.0  /  Alb  2.7[L]  /  TBili  0.5  /  DBili  x   /  AST  16  /  ALT  17  /  AlkPhos  89  11-22

## 2024-11-24 NOTE — PROGRESS NOTE ADULT - SUBJECTIVE AND OBJECTIVE BOX
[   ] ICU                                          [   ] CCU                                      [ X  ] Medical Floor    Patient is a 54 year old male with intractable abdominal pain. GI consulted to evaluate.        54 year old male with past medical history significant for Hyperlipidemia, HTN, DM, CAD, CHF, CHF, ESRD on HD ( L UE AVF)  MWF( follows with Dr. Griffiths) , ascites( follows with Dr. [facility] at [facility])  last drained 10/10/2024 presented  to the emergency room with one day history of progressively worsening non radiating 10/10 intensity crampy periumbilical abdominal pain associated with nausea and a few episodes of non bloody vomiting. Patient denies hematemesis, hematochezia, melena, fever, chills, chest pain, SOB, cough, hematuria, dysuria or diarrhea.     Patient is comfortable. Patient still c/o intermittent abdominal pain and vomiting. No hematemesis, hematochezia, melena, fever, chills, chest pain, SOB, cough or diarrhea reported.        PAST MEDICAL HISTORY    DM    Hypertension    Hyperlipidemia    ESRD on HD    Bone spur    Anemia    Injury of right wrist          PAST SURGICAL HISTORY    Arthroscopy of right shoulder    Vascular access device    Right wrist surgery    AV fistula        Allergies    No Known Allergies    Intolerances  None            SOCIAL HISTORY  Advanced Directives:       [ X ] Full Code       [  ] DNR  Marital Status:         [  ] M      [ X ] S      [  ] D       [  ] W  Children:       [ X ] Yes      [  ] No  Occupation:        [  ] Employed       [ X ] Unemployed       [  ] Retired  Diet:       [ X ] Regular       [  ] PEG feeding          [  ] NG tube feeding  Drug Use:           [X  ] Patient denied          [  ] Yes  Alcohol:           [  ] No             [ X ] Yes (socially)         [  ] Yes (chronic)  Tobacco:           [  ] Yes           [ X ] No    FAMILY HISTORY  [ X ] Heart Disease            [ X ] Diabetes             [ X ] HTN             [  ] Colon Cancer             [  ] Stomach Cancer              [  ] Pancreatic Cancer       VITALS   Vital Signs Last 24 Hrs  T(C): 36.4 (11-24-24 @ 12:24), Max: 37.1 (11-24-24 @ 08:19)  T(F): 97.6 (11-24-24 @ 12:24), Max: 98.7 (11-24-24 @ 08:19)  HR: 74 (11-24-24 @ 12:24) (68 - 74)  BP: 157/76 (11-24-24 @ 12:24) (128/68 - 157/76)   RR: 17 (11-24-24 @ 12:24) (16 - 18)  SpO2: 96% (11-24-24 @ 12:24) (96% - 99%)      MEDICATIONS  (STANDING):  atorvastatin 10 milliGRAM(s) Oral at bedtime  bisacodyl 5 milliGRAM(s) Oral daily  carvedilol 12.5 milliGRAM(s) Oral every 12 hours  cefTRIAXone   IVPB      cefTRIAXone   IVPB 2000 milliGRAM(s) IV Intermittent every 24 hours  chlorhexidine 2% Cloths 1 Application(s) Topical daily  dextrose 50% Injectable 25 Gram(s) IV Push once  furosemide    Tablet 20 milliGRAM(s) Oral daily  heparin   Injectable 5000 Unit(s) SubCutaneous every 8 hours  insulin lispro (ADMELOG) corrective regimen sliding scale   SubCutaneous three times a day before meals  insulin lispro (ADMELOG) corrective regimen sliding scale   SubCutaneous at bedtime  mupirocin 2% Ointment 1 Application(s) Topical two times a day  NIFEdipine XL 90 milliGRAM(s) Oral daily  pantoprazole    Tablet 40 milliGRAM(s) Oral before breakfast  polyethylene glycol 3350 17 Gram(s) Oral daily  senna 2 Tablet(s) Oral at bedtime  traZODone 100 milliGRAM(s) Oral at bedtime    MEDICATIONS  (PRN):  acetaminophen     Tablet .. 650 milliGRAM(s) Oral every 6 hours PRN Temp greater or equal to 38C (100.4F), Mild Pain (1 - 3)  dextrose Oral Gel 15 Gram(s) Oral once PRN Blood Glucose LESS THAN 70 milliGRAM(s)/deciliter  HYDROmorphone  Injectable 0.2 milliGRAM(s) IV Push every 4 hours PRN Pain                            10.2   7.70  )-----------( 167      ( 22 Nov 2024 16:45 )             29.6       11-22    134[L]  |  103  |  74[H]  ----------------------------<  104[H]  4.6   |  23  |  8.43[H]    Ca    9.1      22 Nov 2024 16:45  Phos  3.1     11-22  Mg     2.2     11-22    TPro  7.0  /  Alb  2.7[L]  /  TBili  0.5  /  DBili  x   /  AST  16  /  ALT  17  /  AlkPhos  89  11-22

## 2024-11-24 NOTE — PROGRESS NOTE ADULT - SUBJECTIVE AND OBJECTIVE BOX
NEPHROLOGY MEDICAL CARE, [facility] - Dr. Domenic Griffiths/ Dr. Bishnu Amador/ Dr. Fili Smiley/ Dr. Naomie Crowder    Date of Service: 11-24-24    Patient was seen and examined at bedside.    CC: pt still have difficult eating because gets nausea.    Vital Signs Last 24 Hrs  T(C): 36.3 (24 Nov 2024 09:23), Max: 37.1 (24 Nov 2024 08:19)  T(F): 97.3 (24 Nov 2024 09:23), Max: 98.7 (24 Nov 2024 08:19)  HR: 70 (24 Nov 2024 09:23) (68 - 71)  BP: 145/69 (24 Nov 2024 09:23) (128/68 - 145/69)  BP(mean): --  RR: 17 (24 Nov 2024 09:23) (16 - 18)  SpO2: 98% (24 Nov 2024 09:23) (96% - 99%)    Parameters below as of 24 Nov 2024 09:23  Patient On (Oxygen Delivery Method): nasal cannula  O2 Flow (L/min): 2        PHYSICAL EXAM:  General: No acute respiratory distress.  Eyes: conjunctiva and sclera clear  ENMT: Atraumatic, Normocephalic, supple  Respiratory:   Bilaterally poor lungs; No rales, rhonchi, wheezing  Cardiovascular: S1S2+; no m/r/g  Gastrointestinal: Soft, Non-tender, distended; Bowel sounds present  Neuro:  Awake, Alert & Oriented X3, No focal deficits present.   Ext:  2+ pedal edema, No Cyanosis  Skin: No rashes  Dialysis Access::  Left upper arm AVF thrill/bruit+     MEDICATIONS:  MEDICATIONS  (STANDING):  atorvastatin 10 milliGRAM(s) Oral at bedtime  bisacodyl 5 milliGRAM(s) Oral daily  carvedilol 12.5 milliGRAM(s) Oral every 12 hours  cefTRIAXone   IVPB      cefTRIAXone   IVPB 2000 milliGRAM(s) IV Intermittent every 24 hours  chlorhexidine 2% Cloths 1 Application(s) Topical daily  dextrose 50% Injectable 25 Gram(s) IV Push once  furosemide    Tablet 20 milliGRAM(s) Oral daily  heparin   Injectable 5000 Unit(s) SubCutaneous every 8 hours  insulin lispro (ADMELOG) corrective regimen sliding scale   SubCutaneous three times a day before meals  insulin lispro (ADMELOG) corrective regimen sliding scale   SubCutaneous at bedtime  mupirocin 2% Ointment 1 Application(s) Topical two times a day  NIFEdipine XL 90 milliGRAM(s) Oral daily  pantoprazole    Tablet 40 milliGRAM(s) Oral before breakfast  polyethylene glycol 3350 17 Gram(s) Oral daily  senna 2 Tablet(s) Oral at bedtime  traZODone 100 milliGRAM(s) Oral at bedtime    MEDICATIONS  (PRN):  acetaminophen     Tablet .. 650 milliGRAM(s) Oral every 6 hours PRN Temp greater or equal to 38C (100.4F), Mild Pain (1 - 3)  dextrose Oral Gel 15 Gram(s) Oral once PRN Blood Glucose LESS THAN 70 milliGRAM(s)/deciliter  HYDROmorphone  Injectable 0.2 milliGRAM(s) IV Push every 4 hours PRN Pain          LABS:                        10.2   7.70  )-----------( 167      ( 22 Nov 2024 16:45 )             29.6     11-22    134[L]  |  103  |  74[H]  ----------------------------<  104[H]  4.6   |  23  |  8.43[H]    Ca    9.1      22 Nov 2024 16:45  Phos  3.1     11-22  Mg     2.2     11-22    TPro  7.0  /  Alb  2.7[L]  /  TBili  0.5  /  DBili  x   /  AST  16  /  ALT  17  /  AlkPhos  89  11-22      Urinalysis Basic - ( 22 Nov 2024 16:45 )    Color: x / Appearance: x / SG: x / pH: x  Gluc: 104 mg/dL / Ketone: x  / Bili: x / Urobili: x   Blood: x / Protein: x / Nitrite: x   Leuk Esterase: x / RBC: x / WBC x   Sq Epi: x / Non Sq Epi: x / Bacteria: x        Urine studies    PTH and Vit D:

## 2024-11-24 NOTE — PROGRESS NOTE ADULT - ASSESSMENT
1. ESRD on HD (M/W/F)  -Plan for HD today as per Holiday Schedule. HD orders were written and discussed with dialysis nurse.   -Hemodialysis Renal Diet and Fluid restriction to 1L/day  -Adjust meds to eGFR and avoid IV Gadolinium contrast,NSAIDs, and phosphate enema.  -Monitor Electrolytes daily.  2. HTN:   -bp is acceptable at present.  -continue bp meds  -titrate bp meds to keep sbp >110 and < 130  3. Anemia of ESRD:  -order Epogen if hb <10  -F/u CBC daily  -transfuse if HB < 7.0.  4. Mineral Bone Disease:  -continue phoslo tid.   -monitor phos  5. Abd pain due to SBP. Ascites due to heart failure.   -s/p paracentesis 4L on 11/19  -SBP with diagnostic tap.  -on rocephin and f/u cultures; cefitin upon discharge  -plan for EGD tomorrow.

## 2024-11-24 NOTE — PROGRESS NOTE ADULT - ASSESSMENT
Spontaneous Bacterial Peritonitis  Leukocytosis    Plan -   ·	Cont Rocephin 2gms iv q24hrs  ·	will give IV abxs till Monday then switch to ceftin 250mgs po bid x 2-3 days more. Spontaneous Bacterial Peritonitis  Leukocytosis    Plan -   ·	Continue Rocephin 2gms iv q24hrs  ·	will give IV abxs till Monday then switch to ceftin 250mgs po bid x 2-3 days more. Spontaneous Bacterial Peritonitis  Leukocytosis - normalized    Plan -   ·	Continue Rocephin 2gms iv q24hrs  ·	will give IV abxs till Monday then switch to ceftin 250mgs po bid x 2-3 days more.

## 2024-11-24 NOTE — PROGRESS NOTE ADULT - SUBJECTIVE AND OBJECTIVE BOX
54y Male is under our care for     MEDS:  cefTRIAXone   IVPB      cefTRIAXone   IVPB 2000 milliGRAM(s) IV Intermittent every 24 hours    ALLERGIES: Allergies    No Known Allergies    Intolerances    REVIEW OF SYSTEMS:  [  ] Not able to elicit  General:	  Chest:	  GI:	  :  Skin:	  Musculoskeletal:	  Neuro:	    VITALS:  Vital Signs Last 24 Hrs  T(C): 37.1 (24 Nov 2024 08:19), Max: 37.1 (24 Nov 2024 08:19)  T(F): 98.7 (24 Nov 2024 08:19), Max: 98.7 (24 Nov 2024 08:19)  HR: 68 (24 Nov 2024 08:19) (68 - 71)  BP: 145/63 (24 Nov 2024 08:19) (128/68 - 145/63)  BP(mean): --  RR: 18 (24 Nov 2024 08:19) (16 - 18)  SpO2: 96% (24 Nov 2024 08:19) (96% - 99%)    Parameters below as of 24 Nov 2024 08:19  Patient On (Oxygen Delivery Method): room air    PHYSICAL EXAM:  HEENT:  Neck:  Respiratory:  Cardiovascular:  Gastrointestinal:  :  Extremities:  Skin:  Ortho:  Neuro:    LABS/DIAGNOSTIC TESTS:                        10.2   7.70  )-----------( 167      ( 22 Nov 2024 16:45 )             29.6     WBC Count: 7.70 K/uL (11-22 @ 16:45)  WBC Count: 14.81 K/uL (11-20 @ 10:00)    11-22    134[L]  |  103  |  74[H]  ----------------------------<  104[H]  4.6   |  23  |  8.43[H]    Ca    9.1      22 Nov 2024 16:45  Phos  3.1     11-22  Mg     2.2     11-22    TPro  7.0  /  Alb  2.7[L]  /  TBili  0.5  /  DBili  x   /  AST  16  /  ALT  17  /  AlkPhos  89  11-22    CULTURES:   Ascites Fl  11-19 @ 15:33   No growth  --    polymorphonuclear leukocytes seen  No organisms seen  by cytocentrifuge    .Blood BLOOD  11-18 @ 02:30   No growth at 5 days  --  --    .Blood BLOOD  11-18 @ 02:20   No growth at 5 days  --  --    RADIOLOGY:  no new studies 54y Male with no overnight events. c.o mild abdominal pain.  Refused AM labs. Remains afebrile. s/p dialysis today.     MEDS:  cefTRIAXone   IVPB      cefTRIAXone   IVPB 2000 milliGRAM(s) IV Intermittent every 24 hours    ALLERGIES: Allergies    No Known Allergies    Intolerances    REVIEW OF SYSTEMS:  [  ] Not able to elicit  General: no fevers no malaise  Chest: no cough no sob  GI: +vomiting; +abdominal pain ; No diarrhea  : no urinary sxs   Skin: no rashes  Musculoskeletal: no trauma no LBP  Neuro: no ha's no dizziness     VITALS:  Vital Signs Last 24 Hrs  T(C): 37.1 (24 Nov 2024 08:19), Max: 37.1 (24 Nov 2024 08:19)  T(F): 98.7 (24 Nov 2024 08:19), Max: 98.7 (24 Nov 2024 08:19)  HR: 68 (24 Nov 2024 08:19) (68 - 71)  BP: 145/63 (24 Nov 2024 08:19) (128/68 - 145/63)  BP(mean): --  RR: 18 (24 Nov 2024 08:19) (16 - 18)  SpO2: 96% (24 Nov 2024 08:19) (96% - 99%)    Parameters below as of 24 Nov 2024 08:19  Patient On (Oxygen Delivery Method): room air    PHYSICAL EXAM:  HEENT: normocephalic, conjunctivae and sclerae clear; moist mucous membranes  Neck: supple no LN's   Respiratory: lungs clear no rales  Cardiovascular: S1 S2 reg no murmurs  Gastrointestinal: +BS with soft, distended abdomen; + tender   Extremities: no edema; left upper arm AVF   Skin: no rashes  Ortho: no erythema or joint swelling  Neuro: awake and alert     LABS/DIAGNOSTIC TESTS:                        10.2   7.70  )-----------( 167      ( 22 Nov 2024 16:45 )             29.6     WBC Count: 7.70 K/uL (11-22 @ 16:45)  WBC Count: 14.81 K/uL (11-20 @ 10:00)    11-22    134[L]  |  103  |  74[H]  ----------------------------<  104[H]  4.6   |  23  |  8.43[H]    Ca    9.1      22 Nov 2024 16:45  Phos  3.1     11-22  Mg     2.2     11-22    TPro  7.0  /  Alb  2.7[L]  /  TBili  0.5  /  DBili  x   /  AST  16  /  ALT  17  /  AlkPhos  89  11-22    CULTURES:   Ascites Fl  11-19 @ 15:33   No growth  --    polymorphonuclear leukocytes seen  No organisms seen  by cytocentrifuge    .Blood BLOOD  11-18 @ 02:30   No growth at 5 days  --  --    .Blood BLOOD  11-18 @ 02:20   No growth at 5 days  --  --    RADIOLOGY:  no new studies

## 2024-11-24 NOTE — PROGRESS NOTE ADULT - ASSESSMENT
Health Maintenance       Shingles Vaccine (1 of 2)  Order placed this encounter    COVID-19 Vaccine (3 - 2023-24 season)  Order placed this encounter    Breast Cancer Screening (Yearly)  Scheduled for 5/30/2024           Following review of the above:  Patient is not proceeding with: COVID-19 and Shingles    Note: Refer to final orders and clinician documentation.       1. Abdominal pain with vomiting  2. Ascites  3. SBP  4. S/p therapeutic and diagnostic paracentesis  5. Peptic ulcer disease less likely  6. Anemia    Suggestions:    1. Continue antibiotic   2. Diet as tolerated  3. Protonix 40mg daily  4. Avoid NSAID  5. Monitor H/H  6. Transfuse PRBC as needed  7. EGD  8. DVT prophylaxis

## 2024-11-25 ENCOUNTER — TRANSCRIPTION ENCOUNTER (OUTPATIENT)
Age: 54
End: 2024-11-25

## 2024-11-25 VITALS
HEART RATE: 73 BPM | OXYGEN SATURATION: 97 % | SYSTOLIC BLOOD PRESSURE: 144 MMHG | DIASTOLIC BLOOD PRESSURE: 68 MMHG | TEMPERATURE: 98 F | RESPIRATION RATE: 17 BRPM

## 2024-11-25 DIAGNOSIS — K21.9 GASTRO-ESOPHAGEAL REFLUX DISEASE WITHOUT ESOPHAGITIS: ICD-10-CM

## 2024-11-25 LAB — GLUCOSE BLDC GLUCOMTR-MCNC: 113 MG/DL — HIGH (ref 70–99)

## 2024-11-25 PROCEDURE — 82962 GLUCOSE BLOOD TEST: CPT

## 2024-11-25 PROCEDURE — 88112 CYTOPATH CELL ENHANCE TECH: CPT

## 2024-11-25 PROCEDURE — 87040 BLOOD CULTURE FOR BACTERIA: CPT

## 2024-11-25 PROCEDURE — 99261: CPT

## 2024-11-25 PROCEDURE — 89051 BODY FLUID CELL COUNT: CPT

## 2024-11-25 PROCEDURE — 84295 ASSAY OF SERUM SODIUM: CPT

## 2024-11-25 PROCEDURE — 87015 SPECIMEN INFECT AGNT CONCNTJ: CPT

## 2024-11-25 PROCEDURE — 85610 PROTHROMBIN TIME: CPT

## 2024-11-25 PROCEDURE — 87205 SMEAR GRAM STAIN: CPT

## 2024-11-25 PROCEDURE — 87070 CULTURE OTHR SPECIMN AEROBIC: CPT

## 2024-11-25 PROCEDURE — 84100 ASSAY OF PHOSPHORUS: CPT

## 2024-11-25 PROCEDURE — 85027 COMPLETE CBC AUTOMATED: CPT

## 2024-11-25 PROCEDURE — 76705 ECHO EXAM OF ABDOMEN: CPT

## 2024-11-25 PROCEDURE — 49083 ABD PARACENTESIS W/IMAGING: CPT

## 2024-11-25 PROCEDURE — 83615 LACTATE (LD) (LDH) ENZYME: CPT

## 2024-11-25 PROCEDURE — 82945 GLUCOSE OTHER FLUID: CPT

## 2024-11-25 PROCEDURE — 74176 CT ABD & PELVIS W/O CONTRAST: CPT | Mod: MC

## 2024-11-25 PROCEDURE — 96375 TX/PRO/DX INJ NEW DRUG ADDON: CPT

## 2024-11-25 PROCEDURE — 87641 MR-STAPH DNA AMP PROBE: CPT

## 2024-11-25 PROCEDURE — 36415 COLL VENOUS BLD VENIPUNCTURE: CPT

## 2024-11-25 PROCEDURE — C1769: CPT

## 2024-11-25 PROCEDURE — 86850 RBC ANTIBODY SCREEN: CPT

## 2024-11-25 PROCEDURE — 84157 ASSAY OF PROTEIN OTHER: CPT

## 2024-11-25 PROCEDURE — 76942 ECHO GUIDE FOR BIOPSY: CPT

## 2024-11-25 PROCEDURE — 99285 EMERGENCY DEPT VISIT HI MDM: CPT

## 2024-11-25 PROCEDURE — 87340 HEPATITIS B SURFACE AG IA: CPT

## 2024-11-25 PROCEDURE — 87075 CULTR BACTERIA EXCEPT BLOOD: CPT

## 2024-11-25 PROCEDURE — 84484 ASSAY OF TROPONIN QUANT: CPT

## 2024-11-25 PROCEDURE — 82042 OTHER SOURCE ALBUMIN QUAN EA: CPT

## 2024-11-25 PROCEDURE — 88305 TISSUE EXAM BY PATHOLOGIST: CPT

## 2024-11-25 PROCEDURE — 82803 BLOOD GASES ANY COMBINATION: CPT

## 2024-11-25 PROCEDURE — 85730 THROMBOPLASTIN TIME PARTIAL: CPT

## 2024-11-25 PROCEDURE — 84132 ASSAY OF SERUM POTASSIUM: CPT

## 2024-11-25 PROCEDURE — C1729: CPT

## 2024-11-25 PROCEDURE — 71045 X-RAY EXAM CHEST 1 VIEW: CPT

## 2024-11-25 PROCEDURE — 83690 ASSAY OF LIPASE: CPT

## 2024-11-25 PROCEDURE — 87640 STAPH A DNA AMP PROBE: CPT

## 2024-11-25 PROCEDURE — 85025 COMPLETE CBC W/AUTO DIFF WBC: CPT

## 2024-11-25 PROCEDURE — 99239 HOSP IP/OBS DSCHRG MGMT >30: CPT

## 2024-11-25 PROCEDURE — 83605 ASSAY OF LACTIC ACID: CPT

## 2024-11-25 PROCEDURE — 80053 COMPREHEN METABOLIC PANEL: CPT

## 2024-11-25 PROCEDURE — 86900 BLOOD TYPING SEROLOGIC ABO: CPT

## 2024-11-25 PROCEDURE — 83735 ASSAY OF MAGNESIUM: CPT

## 2024-11-25 PROCEDURE — 82330 ASSAY OF CALCIUM: CPT

## 2024-11-25 PROCEDURE — 96374 THER/PROPH/DIAG INJ IV PUSH: CPT

## 2024-11-25 PROCEDURE — 86901 BLOOD TYPING SEROLOGIC RH(D): CPT

## 2024-11-25 PROCEDURE — 93005 ELECTROCARDIOGRAM TRACING: CPT

## 2024-11-25 RX ORDER — PANTOPRAZOLE SODIUM 40 MG/1
1 TABLET, DELAYED RELEASE ORAL
Qty: 30 | Refills: 0
Start: 2024-11-25 | End: 2024-12-24

## 2024-11-25 RX ORDER — CHLORHEXIDINE GLUCONATE 1.2 MG/ML
1 RINSE ORAL
Refills: 0 | Status: DISCONTINUED | OUTPATIENT
Start: 2024-11-25 | End: 2024-11-25

## 2024-11-25 RX ORDER — ONDANSETRON HYDROCHLORIDE 4 MG/1
1 TABLET, FILM COATED ORAL
Qty: 18 | Refills: 0
Start: 2024-11-25 | End: 2024-11-30

## 2024-11-25 RX ADMIN — CARVEDILOL 12.5 MILLIGRAM(S): 25 TABLET, FILM COATED ORAL at 14:36

## 2024-11-25 RX ADMIN — Medication 90 MILLIGRAM(S): at 14:36

## 2024-11-25 RX ADMIN — PANTOPRAZOLE SODIUM 40 MILLIGRAM(S): 40 TABLET, DELAYED RELEASE ORAL at 14:36

## 2024-11-25 RX ADMIN — FUROSEMIDE 20 MILLIGRAM(S): 40 TABLET ORAL at 14:37

## 2024-11-25 RX ADMIN — CHLORHEXIDINE GLUCONATE 1 APPLICATION(S): 1.2 RINSE ORAL at 14:38

## 2024-11-25 NOTE — PROGRESS NOTE ADULT - SKIN
warm and dry/no rashes

## 2024-11-25 NOTE — PROGRESS NOTE ADULT - NEGATIVE ENMT SYMPTOMS
no hearing difficulty/no ear pain/no tinnitus/no vertigo/no sinus symptoms/no nasal congestion/no nasal discharge/no nose bleeds/no gum bleeding/no dry mouth/no throat pain/no dysphagia

## 2024-11-25 NOTE — DIETITIAN INITIAL EVALUATION ADULT - PROBLEM SELECTOR PLAN 4
EKG shows sinus james with 1st degree AVB     as per prior EKG patient has Hx of  1 st degree AVB    currently asymptomatic  c/w monitoring

## 2024-11-25 NOTE — PROGRESS NOTE ADULT - SUBJECTIVE AND OBJECTIVE BOX
[   ] ICU                                          [   ] CCU                                      [ X  ] Medical Floor      Patient is a 54 year old male with intractable abdominal pain. GI consulted to evaluate.        54 year old male with past medical history significant for Hyperlipidemia, HTN, DM, CAD, CHF, CHF, ESRD on HD ( L UE AVF)  MWF( follows with Dr. Griffiths) , ascites( follows with Dr. [facility] at [facility])  last drained 10/10/2024 presented  to the emergency room with one day history of progressively worsening non radiating 10/10 intensity crampy periumbilical abdominal pain associated with nausea and a few episodes of non bloody vomiting. Patient denies hematemesis, hematochezia, melena, fever, chills, chest pain, SOB, cough, hematuria, dysuria or diarrhea.     Patient is comfortable. Patient still c/o intermittent abdominal pain and vomiting. No hematemesis, hematochezia, melena, fever, chills, chest pain, SOB, cough or diarrhea reported.        PAST MEDICAL HISTORY    DM    Hypertension    Hyperlipidemia    ESRD on HD    Bone spur    Anemia    Injury of right wrist          PAST SURGICAL HISTORY    Arthroscopy of right shoulder    Vascular access device    Right wrist surgery    AV fistula        Allergies    No Known Allergies    Intolerances  None            SOCIAL HISTORY  Advanced Directives:       [ X ] Full Code       [  ] DNR  Marital Status:         [  ] M      [ X ] S      [  ] D       [  ] W  Children:       [ X ] Yes      [  ] No  Occupation:        [  ] Employed       [ X ] Unemployed       [  ] Retired  Diet:       [ X ] Regular       [  ] PEG feeding          [  ] NG tube feeding  Drug Use:           [X  ] Patient denied          [  ] Yes  Alcohol:           [  ] No             [ X ] Yes (socially)         [  ] Yes (chronic)  Tobacco:           [  ] Yes           [ X ] No    FAMILY HISTORY  [ X ] Heart Disease            [ X ] Diabetes             [ X ] HTN             [  ] Colon Cancer             [  ] Stomach Cancer              [  ] Pancreatic Cancer      VITALS   Vital Signs Last 24 Hrs  T(C): 36.8 (24 Nov 2024 20:00), Max: 36.8 (24 Nov 2024 20:00)  T(F): 98.2 (24 Nov 2024 20:00), Max: 98.2 (24 Nov 2024 20:00)  HR: 69 (24 Nov 2024 20:00) (69 - 74)  BP: 136/70 (24 Nov 2024 20:00) (134/62 - 157/76)   RR: 17 (24 Nov 2024 20:00) (17 - 18)  SpO2: 97% (24 Nov 2024 20:00) (96% - 99%)  Parameters below as of 24 Nov 2024 20:00  Patient On (Oxygen Delivery Method): room air        MEDICATIONS  (STANDING):  atorvastatin 10 milliGRAM(s) Oral at bedtime  bisacodyl 5 milliGRAM(s) Oral daily  carvedilol 12.5 milliGRAM(s) Oral every 12 hours  cefTRIAXone   IVPB      cefTRIAXone   IVPB 2000 milliGRAM(s) IV Intermittent every 24 hours  chlorhexidine 2% Cloths 1 Application(s) Topical <User Schedule>  dextrose 50% Injectable 25 Gram(s) IV Push once  furosemide    Tablet 20 milliGRAM(s) Oral daily  heparin   Injectable 5000 Unit(s) SubCutaneous every 8 hours  insulin lispro (ADMELOG) corrective regimen sliding scale   SubCutaneous three times a day before meals  insulin lispro (ADMELOG) corrective regimen sliding scale   SubCutaneous at bedtime  NIFEdipine XL 90 milliGRAM(s) Oral daily  pantoprazole    Tablet 40 milliGRAM(s) Oral before breakfast  polyethylene glycol 3350 17 Gram(s) Oral daily  senna 2 Tablet(s) Oral at bedtime  traZODone 100 milliGRAM(s) Oral at bedtime    MEDICATIONS  (PRN):  acetaminophen     Tablet .. 650 milliGRAM(s) Oral every 6 hours PRN Temp greater or equal to 38C (100.4F), Mild Pain (1 - 3)  dextrose Oral Gel 15 Gram(s) Oral once PRN Blood Glucose LESS THAN 70 milliGRAM(s)/deciliter  HYDROmorphone  Injectable 0.2 milliGRAM(s) IV Push every 4 hours PRN Pain  ondansetron Injectable 4 milliGRAM(s) IV Push every 8 hours PRN Nausea and/or Vomiting

## 2024-11-25 NOTE — DIETITIAN INITIAL EVALUATION ADULT - PERTINENT MEDS FT
MEDICATIONS  (STANDING):  atorvastatin 10 milliGRAM(s) Oral at bedtime  bisacodyl 5 milliGRAM(s) Oral daily  carvedilol 12.5 milliGRAM(s) Oral every 12 hours  cefTRIAXone   IVPB      cefTRIAXone   IVPB 2000 milliGRAM(s) IV Intermittent every 24 hours  chlorhexidine 2% Cloths 1 Application(s) Topical <User Schedule>  dextrose 50% Injectable 25 Gram(s) IV Push once  furosemide    Tablet 20 milliGRAM(s) Oral daily  heparin   Injectable 5000 Unit(s) SubCutaneous every 8 hours  insulin lispro (ADMELOG) corrective regimen sliding scale   SubCutaneous three times a day before meals  insulin lispro (ADMELOG) corrective regimen sliding scale   SubCutaneous at bedtime  NIFEdipine XL 90 milliGRAM(s) Oral daily  pantoprazole    Tablet 40 milliGRAM(s) Oral before breakfast  polyethylene glycol 3350 17 Gram(s) Oral daily  senna 2 Tablet(s) Oral at bedtime  traZODone 100 milliGRAM(s) Oral at bedtime    MEDICATIONS  (PRN):  acetaminophen     Tablet .. 650 milliGRAM(s) Oral every 6 hours PRN Temp greater or equal to 38C (100.4F), Mild Pain (1 - 3)  dextrose Oral Gel 15 Gram(s) Oral once PRN Blood Glucose LESS THAN 70 milliGRAM(s)/deciliter  HYDROmorphone  Injectable 0.2 milliGRAM(s) IV Push every 4 hours PRN Pain  ondansetron Injectable 4 milliGRAM(s) IV Push every 8 hours PRN Nausea and/or Vomiting

## 2024-11-25 NOTE — PROGRESS NOTE ADULT - GIT GEN HX ROS MEA POS PC
nausea/vomiting/abdominal pain

## 2024-11-25 NOTE — DIETITIAN INITIAL EVALUATION ADULT - OTHER INFO
Pt Visited. Pt OOb to chair. Pt Reports  Po intake Varies. Pt eats good after Paracentesis. But as Fluid builds up Po intake < 50 % of meal. Pt Gets Paracentesis every 4-5 weeks. S/P paracentesis ~ 4600 ml fluid removed on 11/19. Pt w H/O Cirrhosis . Pt with ascites d/t HF.  Pt is on paracentesis since Jan 2022. Per Pt HT 5 feet 11 1/2 inches. USUAL BODY  Lb ~ 6 months ago. Current wt Post  LB. wt loss is combination of Paracentesis , Decrease po intake before ascites ~ 1 week. NFPE  Partially Performed. Since Pt  Refused.  Pt Likes [employer] flavour Nutritional supplement. Pt is on NEPRO   BID. GI on case. Pt was [facility]. Per Pt H/O DM but d/t Disease Process BG are Well controlled and sometimes goes low. Pt educated on Fluid Restriction ( 1 Litre) Pt receptive. NO N/V Reported at present.

## 2024-11-25 NOTE — DISCHARGE NOTE NURSING/CASE MANAGEMENT/SOCIAL WORK - NSDCVIVACCINE_GEN_ALL_CORE_FT
Tdap; 14-Dec-2021 19:18; Roxi Lewis (MARIA ELENA); [employer]; [id number] (Exp. Date: 09-Sep-2023); IntraMuscular; Deltoid Right.; 0.5 milliLiter(s); VIS (VIS Published: 09-May-2013, VIS Presented: 14-Dec-2021);

## 2024-11-25 NOTE — PROGRESS NOTE ADULT - NEGATIVE GENERAL GENITOURINARY SYMPTOMS
"[facility] [place].  [place] [facility] [facility] domestic violence shelter [facility] MI commitment through [facility]. Per chart review from previous admission, pt's [facility] : Grayson Gerardo ([facility]): [phone]. [facility] attempted to reach. [facility] [facility] 11/2/23-1/30/24 for similar presentation. Currently under MI commitment through [facility]. [employer] Paging/Directory   ______________________________________________________________________                [facility] : Grayson Gerardo ([facility] [place]): 
no hematuria/no renal colic/no flank pain L/no flank pain R/no incontinence
[phone].  Hospitalizations: Per chart review [place] [facility] [place]  History of Commitment: During most recent psychiatric hospitalization [place] [facility] [facility] ELISEO Fink CNP     -     3/23/2024     -     10:00 AM                      
no hematuria/no renal colic/no flank pain L/no flank pain R/no incontinence

## 2024-11-25 NOTE — DISCHARGE NOTE NURSING/CASE MANAGEMENT/SOCIAL WORK - NSDCPEFALRISK_GEN_ALL_CORE
For information on Fall & Injury Prevention, visit: [email or web address] OR  [email or web address] OR  https://www.cdc.gov/steadi/patient.html

## 2024-11-25 NOTE — PROGRESS NOTE ADULT - NEGATIVE OPHTHALMOLOGIC SYMPTOMS
no diplopia/no photophobia/no lacrimation L/no lacrimation R/no blurred vision L/no blurred vision R/no discharge L/no discharge R/no pain L/no pain R/no irritation L/no irritation R/no scleral injection L/no scleral injection R

## 2024-11-25 NOTE — DIETITIAN INITIAL EVALUATION ADULT - ENTER FROM (CAL/KG)
Dapsone Pregnancy And Lactation Text: This medication is Pregnancy Category C and is not considered safe during pregnancy or breast feeding. Bactrim Counseling:  I discussed with the patient the risks of sulfa antibiotics including but not limited to GI upset, allergic reaction, drug rash, diarrhea, dizziness, photosensitivity, and yeast infections.  Rarely, more serious reactions can occur including but not limited to aplastic anemia, agranulocytosis, methemoglobinemia, blood dyscrasias, liver or kidney failure, lung infiltrates or desquamative/blistering drug rashes. Tetracycline Pregnancy And Lactation Text: This medication is Pregnancy Category D and not consider safe during pregnancy. It is also excreted in breast milk. Minocycline Counseling: Patient advised regarding possible photosensitivity and discoloration of the teeth, skin, lips, tongue and gums.  Patient instructed to avoid sunlight, if possible.  When exposed to sunlight, patients should wear protective clothing, sunglasses, and sunscreen.  The patient was instructed to call the office immediately if the following severe adverse effects occur:  hearing changes, easy bruising/bleeding, severe headache, or vision changes.  The patient verbalized understanding of the proper use and possible adverse effects of minocycline.  All of the patient's questions and concerns were addressed. Topical Sulfur Applications Counseling: Topical Sulfur Counseling: Patient counseled that this medication may cause skin irritation or allergic reactions.  In the event of skin irritation, the patient was advised to reduce the amount of the drug applied or use it less frequently.   The patient verbalized understanding of the proper use and possible adverse effects of topical sulfur application.  All of the patient's questions and concerns were addressed. Topical Retinoid Pregnancy And Lactation Text: This medication is Pregnancy Category C. It is unknown if this medication is excreted in breast milk. Erythromycin Pregnancy And Lactation Text: This medication is Pregnancy Category B and is considered safe during pregnancy. It is also excreted in breast milk. Dapsone Counseling: I discussed with the patient the risks of dapsone including but not limited to hemolytic anemia, agranulocytosis, rashes, methemoglobinemia, kidney failure, peripheral neuropathy, headaches, GI upset, and liver toxicity.  Patients who start dapsone require monitoring including baseline LFTs and weekly CBCs for the first month, then every month thereafter.  The patient verbalized understanding of the proper use and possible adverse effects of dapsone.  All of the patient's questions and concerns were addressed. Azithromycin Pregnancy And Lactation Text: This medication is considered safe during pregnancy and is also secreted in breast milk. Detail Level: Detailed Tetracycline Counseling: Patient counseled regarding possible photosensitivity and increased risk for sunburn.  Patient instructed to avoid sunlight, if possible.  When exposed to sunlight, patients should wear protective clothing, sunglasses, and sunscreen.  The patient was instructed to call the office immediately if the following severe adverse effects occur:  hearing changes, easy bruising/bleeding, severe headache, or vision changes.  The patient verbalized understanding of the proper use and possible adverse effects of tetracycline.  All of the patient's questions and concerns were addressed. Patient understands to avoid pregnancy while on therapy due to potential birth defects. 30 Topical Clindamycin Pregnancy And Lactation Text: This medication is Pregnancy Category B and is considered safe during pregnancy. It is unknown if it is excreted in breast milk. High Dose Vitamin A Pregnancy And Lactation Text: High dose vitamin A therapy is contraindicated during pregnancy and breast feeding. Erythromycin Counseling:  I discussed with the patient the risks of erythromycin including but not limited to GI upset, allergic reaction, drug rash, diarrhea, increase in liver enzymes, and yeast infections. Topical Retinoid counseling:  Patient advised to apply a pea-sized amount only at bedtime and wait 30 minutes after washing their face before applying.  If too drying, patient may add a non-comedogenic moisturizer. The patient verbalized understanding of the proper use and possible adverse effects of retinoids.  All of the patient's questions and concerns were addressed. Birth Control Pills Pregnancy And Lactation Text: This medication should be avoided if pregnant and for the first 30 days post-partum. Azithromycin Counseling:  I discussed with the patient the risks of azithromycin including but not limited to GI upset, allergic reaction, drug rash, diarrhea, and yeast infections. Use Enhanced Medication Counseling?: No Spironolactone Pregnancy And Lactation Text: This medication can cause feminization of the male fetus and should be avoided during pregnancy. The active metabolite is also found in breast milk. High Dose Vitamin A Counseling: Side effects reviewed, pt to contact office should one occur. Topical Clindamycin Counseling: Patient counseled that this medication may cause skin irritation or allergic reactions.  In the event of skin irritation, the patient was advised to reduce the amount of the drug applied or use it less frequently.   The patient verbalized understanding of the proper use and possible adverse effects of clindamycin.  All of the patient's questions and concerns were addressed. Doxycycline Pregnancy And Lactation Text: This medication is Pregnancy Category D and not consider safe during pregnancy. It is also excreted in breast milk but is considered safe for shorter treatment courses. Birth Control Pills Counseling: Birth Control Pill Counseling: I discussed with the patient the potential side effects of OCPs including but not limited to increased risk of stroke, heart attack, thrombophlebitis, deep venous thrombosis, hepatic adenomas, breast changes, GI upset, headaches, and depression.  The patient verbalized understanding of the proper use and possible adverse effects of OCPs. All of the patient's questions and concerns were addressed. Benzoyl Peroxide Pregnancy And Lactation Text: This medication is Pregnancy Category C. It is unknown if benzoyl peroxide is excreted in breast milk. Spironolactone Counseling: Patient advised regarding risks of diarrhea, abdominal pain, hyperkalemia, birth defects (for female patients), liver toxicity and renal toxicity. The patient may need blood work to monitor liver and kidney function and potassium levels while on therapy. The patient verbalized understanding of the proper use and possible adverse effects of spironolactone.  All of the patient's questions and concerns were addressed. Isotretinoin Pregnancy And Lactation Text: This medication is Pregnancy Category X and is considered extremely dangerous during pregnancy. It is unknown if it is excreted in breast milk. Tazorac Pregnancy And Lactation Text: This medication is not safe during pregnancy. It is unknown if this medication is excreted in breast milk. Doxycycline Counseling:  Patient counseled regarding possible photosensitivity and increased risk for sunburn.  Patient instructed to avoid sunlight, if possible.  When exposed to sunlight, patients should wear protective clothing, sunglasses, and sunscreen.  The patient was instructed to call the office immediately if the following severe adverse effects occur:  hearing changes, easy bruising/bleeding, severe headache, or vision changes.  The patient verbalized understanding of the proper use and possible adverse effects of doxycycline.  All of the patient's questions and concerns were addressed. Bactrim Pregnancy And Lactation Text: This medication is Pregnancy Category D and is known to cause fetal risk.  It is also excreted in breast milk. Benzoyl Peroxide Counseling: Patient counseled that medicine may cause skin irritation and bleach clothing.  In the event of skin irritation, the patient was advised to reduce the amount of the drug applied or use it less frequently.   The patient verbalized understanding of the proper use and possible adverse effects of benzoyl peroxide.  All of the patient's questions and concerns were addressed. Topical Sulfur Applications Pregnancy And Lactation Text: This medication is Pregnancy Category C and has an unknown safety profile during pregnancy. It is unknown if this topical medication is excreted in breast milk. Isotretinoin Counseling: Patient should get monthly blood tests, not donate blood, not drive at night if vision affected, not share medication, and not undergo elective surgery for 6 months after tx completed. Side effects reviewed, pt to contact office should one occur. Tazorac Counseling:  Patient advised that medication is irritating and drying.  Patient may need to apply sparingly and wash off after an hour before eventually leaving it on overnight.  The patient verbalized understanding of the proper use and possible adverse effects of tazorac.  All of the patient's questions and concerns were addressed.

## 2024-11-25 NOTE — CHART NOTE - NSCHARTNOTEFT_GEN_A_CORE
GI procedure cancelled today by GI. Procedure postponed until Wednesday.  Patient has elected to go home and follow up with his own GI doctor.  Medically stable for discharge.  Medications sent to pharmacy.  All questions answered.    Vital Signs Last 24 Hrs  T(C): 36.7 (25 Nov 2024 14:33), Max: 36.8 (24 Nov 2024 20:00)  T(F): 98 (25 Nov 2024 14:33), Max: 98.2 (24 Nov 2024 20:00)  HR: 73 (25 Nov 2024 14:33) (69 - 73)  BP: 144/68 (25 Nov 2024 14:33) (134/62 - 144/68)  BP(mean): --  RR: 17 (25 Nov 2024 14:33) (17 - 17)  SpO2: 97% (25 Nov 2024 14:33) (97% - 97%)    Parameters below as of 25 Nov 2024 14:33  Patient On (Oxygen Delivery Method): room air          POCT Blood Glucose.: 113 mg/dL (25 Nov 2024 08:00)  POCT Blood Glucose.: 135 mg/dL (24 Nov 2024 21:29)  POCT Blood Glucose.: 132 mg/dL (24 Nov 2024 17:15)
EVENT: MRSA/MSSA PCR (11.19.24 @ 09:46)  MRSA PCR Result.: NotDetec:   Staph aureus PCR Result: Detected    BRIEF HPI: 54Y/ M with PMHx of HTN, HDL, DM (not on medications currently), CHF, ESRD on HD ( L UE AVF)  MWF( follows with Dr. Griffiths) , ascites( follows with Dr. Li at [facility])  last drained 10/10/2024 presented  to the with intractable abdominal pain associated with nausea, bilious emesis x 4 . In the ER, VSS, s/p  Zofran Tylenol Pepcid , morphine . Labs s/o Hb 12.1, Na 134, BUn / Cr 31/ 5.13. CTAP c/w large volume ascites. Admitteding admitted to medicine for symptomatic treatment of  intractable abdominal pain. S/p therapeutic and diagnostic paracentesis 4600 cc serous fluid drained 11/19. F/u cultures.      Vital Signs Last 24 Hrs  T(C): 36.7 (19 Nov 2024 21:20), Max: 37.9 (19 Nov 2024 02:08)  T(F): 98 (19 Nov 2024 21:20), Max: 100.3 (19 Nov 2024 02:08)  HR: 63 (19 Nov 2024 21:20) (63 - 80)  BP: 126/64 (19 Nov 2024 21:20) (126/64 - 178/73)  BP(mean): 99 (19 Nov 2024 02:08) (99 - 99)  RR: 20 (19 Nov 2024 21:20) (18 - 20)  SpO2: 95% (19 Nov 2024 21:20) (91% - 97%)    Parameters below as of 19 Nov 2024 21:20  Patient On (Oxygen Delivery Method): room air    PLAN  Mupirocin 2% Ointment 1 Application(s) Topical two times a day X 5 dys  Chlorhexidine 2% Cloths 1 Application(s) Topical daily X 5 dys    FOLLOW UP: effectiveness of med
Requested by GI Dr. Cervantes to schedule pt for EGD.     -Tentative EGD on Wednesday, Nov 27th.  Keep NPO after midnight Tuesday. Hold pharmacologic DVT ppx 24 hrs prior to procedure.   -Rest of recs per primary GI Dr. Cervantes  -D/w pt   D/w primary team.     Inga Bennett NP   GI
Requesting new PCP; provided information for new PCP

## 2024-11-25 NOTE — DIETITIAN INITIAL EVALUATION ADULT - SIGNS/SYMPTOMS
PO INTAKE< 50 % X 1 WEEK before Paracentesis, Loss of Muscle mass ( MOD), Wt loss ~8.7 % x 6 mos(flu

## 2024-11-25 NOTE — PROGRESS NOTE ADULT - REASON FOR ADMISSION
intractable abdominal pain

## 2024-11-25 NOTE — DIETITIAN INITIAL EVALUATION ADULT - PROBLEM SELECTOR PLAN 1
s/p zofran, tylenol, pepcid , morphine     Patient reports mild improvement in pain   Patient is hungry, requesting trial of liquids    clear liquids - advance as tolerated   c/w tylenol for mild , dilaudid for severe pain PRN  c/w protonix, maalox PRN

## 2024-11-25 NOTE — PROGRESS NOTE ADULT - NEGATIVE NEUROLOGICAL SYMPTOMS
no focal seizures/no syncope/no tremors/no vertigo/no loss of sensation/no headache

## 2024-11-25 NOTE — PROGRESS NOTE ADULT - NEGATIVE RESPIRATORY AND THORAX SYMPTOMS
no wheezing/no dyspnea/no cough/no hemoptysis/no pleuritic chest pain

## 2024-11-25 NOTE — PROGRESS NOTE ADULT - NEGATIVE ENDOCRINE SYMPTOMS
no cold intolerance/no heat intolerance/no striae

## 2024-11-25 NOTE — PROGRESS NOTE ADULT - NEGATIVE GENERAL SYMPTOMS
no fever/no chills/no sweating/no anorexia/no polyphagia/no polyuria/no polydipsia

## 2024-11-25 NOTE — PROGRESS NOTE ADULT - RESPIRATORY
clear to auscultation bilaterally/no wheezes/no rales/no rhonchi/no respiratory distress/no use of accessory muscles/no subcutaneous emphysema/airway patent/breath sounds equal/good air movement/respirations non-labored

## 2024-11-25 NOTE — PROGRESS NOTE ADULT - PROVIDER SPECIALTY LIST ADULT
Infectious Disease
Nephrology
Nephrology
Infectious Disease
Internal Medicine
Nephrology
Gastroenterology
Internal Medicine
Gastroenterology
Internal Medicine
Internal Medicine
Gastroenterology

## 2024-11-25 NOTE — DIETITIAN INITIAL EVALUATION ADULT - NS FNS DIET ORDER
Diet, Renal Restrictions:   For patients receiving Renal Replacement - No Protein Restr, No Conc K, No Conc Phos, Low Sodium  Supplement Feeding Modality:  Oral  Nepro Cans or Servings Per Day:  2       Frequency:  Two Times a day (11-25-24 @ 12:41)

## 2024-11-25 NOTE — PROGRESS NOTE ADULT - NEGATIVE MUSCULOSKELETAL SYMPTOMS
no muscle cramps/no stiffness/no neck pain/no arm pain L/no arm pain R/no leg pain L/no leg pain R

## 2024-11-25 NOTE — PROGRESS NOTE ADULT - NECK
supple/symmetric/no tracheal deviation
supple/symmetric
supple/symmetric/no tracheal deviation

## 2024-11-25 NOTE — PROGRESS NOTE ADULT - TONSILS
no redness/no swelling
no redness/no swelling
no redness/no swelling/no discharge

## 2024-11-25 NOTE — DIETITIAN INITIAL EVALUATION ADULT - PROBLEM SELECTOR PLAN 9
h/o DM not on any home meds for many years   controlled by diet   will hold off on any IP treatment and monitor CMP daily

## 2024-11-25 NOTE — PROGRESS NOTE ADULT - CONSTITUTIONAL
well-groomed/no distress

## 2024-11-25 NOTE — DISCHARGE NOTE NURSING/CASE MANAGEMENT/SOCIAL WORK - FINANCIAL ASSISTANCE
[facility] provides services at a reduced cost to those who are determined to be eligible through [facility]’s financial assistance program. Information regarding [facility]’s financial assistance program can be found by going to [email or web address] or by calling [phone].

## 2024-11-25 NOTE — DIETITIAN NUTRITION RISK NOTIFICATION - TREATMENT: THE FOLLOWING DIET HAS BEEN RECOMMENDED
Diet, Renal Restrictions:   For patients receiving Renal Replacement - No Protein Restr, No Conc K, No Conc Phos, Low Sodium  Supplement Feeding Modality:  Oral  Nepro Cans or Servings Per Day:  2       Frequency:  Two Times a day (11-25-24 @ 12:41) [Active]

## 2024-11-25 NOTE — PROGRESS NOTE ADULT - SUBJECTIVE AND OBJECTIVE BOX
54y Male sitting up in the chair in no acute distress. Reports his abdominal pain has improved today. He has mild nausea but no vomiting. No fevers or chills.     MEDS:  cefTRIAXone   IVPB      cefTRIAXone   IVPB 2000 milliGRAM(s) IV Intermittent every 24 hours    ALLERGIES: Allergies    No Known Allergies    Intolerances    REVIEW OF SYSTEMS:  [  ] Not able to elicit  General: no fevers no malaise  Chest: no cough no sob  GI: no vomiting; +mild abdominal pain ; No diarrhea, +nausea  : no urinary sxs   Skin: no rashes  Musculoskeletal: no trauma no LBP  Neuro: no ha's no dizziness     VITALS:  Vital Signs Last 24 Hrs  T(C): 36.8 (24 Nov 2024 20:00), Max: 36.8 (24 Nov 2024 20:00)  T(F): 98.2 (24 Nov 2024 20:00), Max: 98.2 (24 Nov 2024 20:00)  HR: 69 (24 Nov 2024 20:00) (69 - 73)  BP: 136/70 (24 Nov 2024 20:00) (134/62 - 155/75)  BP(mean): --  RR: 17 (24 Nov 2024 20:00) (17 - 18)  SpO2: 97% (24 Nov 2024 20:00) (97% - 99%)    Parameters below as of 24 Nov 2024 20:00  Patient On (Oxygen Delivery Method): room air    PHYSICAL EXAM:  HEENT: normocephalic, conjunctivae and sclerae clear; moist mucous membranes  Neck: supple no LN's   Respiratory: lungs clear no rales  Cardiovascular: S1 S2 reg no murmurs  Gastrointestinal: +BS with soft, distended abdomen; + mildly tender   Extremities: no edema; left upper arm AVF   Skin: no rashes  Ortho: no erythema or joint swelling  Neuro: A&Ox3    LABS/DIAGNOSTIC TESTS:    WBC Count: 7.70 K/uL (11-22 @ 16:45)    CULTURES:   Ascites Fl  11-19 @ 15:33   No growth at 5 days  --    polymorphonuclear leukocytes seen  No organisms seen  by cytocentrifuge    .Blood BLOOD  11-18 @ 02:30   No growth at 5 days  --  --    .Blood BLOOD  11-18 @ 02:20   No growth at 5 days  --  --    RADIOLOGY:

## 2024-11-25 NOTE — DIETITIAN INITIAL EVALUATION ADULT - PROBLEM SELECTOR PLAN 5
As per chart review - Hx of cirrhosis   follows with Dr. Li at [facility] radiology  for regular  paracentesis   lasts session 11/10, next scheduled for 11/26  patient started to have chills   s/p CTX 2 g IV for SBP PPx     [ ] consider hepatology consult in AM

## 2024-11-25 NOTE — DISCHARGE NOTE NURSING/CASE MANAGEMENT/SOCIAL WORK - PATIENT PORTAL LINK FT
You can access the FollowMyHealth Patient Portal offered by [facility] by registering at the following website: [email or web address]. By joining [facility]’s FollowMyHealth portal, you will also be able to view your health information using other applications (apps) compatible with our system.

## 2024-11-25 NOTE — PROGRESS NOTE ADULT - NS ATTEND AMEND GEN_ALL_CORE FT
I agree with above
I agree with above
54M PMH HTN, HLD, DM, ESRD on HD MWF, ascites, p/w intractable abdominal pain, N/V, found with large volume ascites and ?bladder thickening on CTAP. S/p therapeutic paracentesis. Started on CTX for SBP. Ascites fluid is c/w SBP. Pain and nausea are improved but still present.      AP:  Sepsis 2/2   SBP  Ascites  ESRD HD MWF  HF  HLD  DM    -c/w CTX 2g x5d; will consider extending abx if no improvement  -monitor vitals and WBC count  -f/u BCx and ascites fluid cx  -HD per nephrology  -may consider dx para in a few days if not improving  -c/w PPI, prn zofran, prn pain meds  -rest of care as above    =================================  I independently reviewed the following tests: N/A  I discussed management with specialists and the plan of care is described above.  I ordered labs and studies: CBC, BMP  I reviewed the following labs to guide my management:                        10.9   14.81 )-----------( 149      ( 20 Nov 2024 10:00 )             31.6     11-20    130[L]  |  98  |  68[H]  ----------------------------<  196[H]  5.4[H]   |  26  |  7.73[H]    Ca    8.9      20 Nov 2024 10:00  Phos  3.1     11-19  Mg     2.1     11-19    TPro  7.0  /  Alb  2.9[L]  /  TBili  0.6  /  DBili  x   /  AST  15  /  ALT  17  /  AlkPhos  76  11-20
Patient seen and examined. Case discussed with JAX Macias. Patient seen prior to paracentesis. He continues to endorse abdominal pain but denies any SOB at this time. Lab work notable for ongoing leukocytosis to 13.86K with 8% bands, concerning for possible SBP. Patient underwent 4.6L paracentesis today. Fluids studies remain pending but are in the lab. Will need to assess/rule out SBP as the etiology of the leukocytosis/bandemia and abdominal pain. Patient is not on antibiotics at this time, but will restart ceftriaxone 2g IV qdaily for concerns over SBP until we can rule it in or out. Appreciate GI follow-up. Also appreciate nephrology follow-up. Will plan for HD tomorrow with attempts at UF removal. Will follow-up any additional recommendations. Remaining care as noted above.
54M PMH HTN, HLD, DM, ESRD on HD MWF, ascites, p/w intractable abdominal pain, N/V, found with large volume ascites and ?bladder thickening on CTAP. S/p therapeutic paracentesis. Started on CTX for SBP. Ascites fluid is c/w SBP. Pain and nausea are improved but still present.      AP:  Sepsis 2/2   SBP  Ascites  ESRD HD MWF  HF  HLD  DM    -consulted ID Dr. Lopez  -c/w CTX 2g x5d; will consider extending abx if no improvement  -monitor vitals and WBC count  -f/u BCx and ascites fluid cx  -HD per nephrology  -may consider dx para in a few days if not improving  -c/w PPI, prn zofran, prn pain meds  -rest of care as above    =================================  I independently reviewed the following tests: N/A  I discussed management with specialists and the plan of care is described above.  I ordered labs and studies: CBC, BMP  I reviewed the following labs to guide my management:
54M PMH HTN, HLD, DM, ESRD on HD MWF, ascites, p/w intractable abdominal pain, N/V, found with large volume ascites and ?bladder thickening on CTAP. S/p therapeutic paracentesis. Started on CTX for SBP. Ascites fluid is c/w SBP. Pain and nausea are improved but still present.      AP:  Sepsis 2/2   SBP  Ascites  ESRD HD MWF  HF  HLD  DM    -c/w CTX 2g x5d  -monitor vitals and WBC count  -f/u BCx and ascites fluid cx  -HD per nephrology  -may consider dx para in a few days  -c/w PPI, prn zofran, prn pain meds  -rest of care as above    =================================  I independently reviewed the following tests: N/A  I discussed management with specialists and the plan of care is described above.  I ordered labs and studies: CBC, BMP  I reviewed the following labs to guide my management:                        10.9   14.81 )-----------( 149      ( 20 Nov 2024 10:00 )             31.6     11-20    130[L]  |  98  |  68[H]  ----------------------------<  196[H]  5.4[H]   |  26  |  7.73[H]    Ca    8.9      20 Nov 2024 10:00  Phos  3.1     11-19  Mg     2.1     11-19    TPro  7.0  /  Alb  2.9[L]  /  TBili  0.6  /  DBili  x   /  AST  15  /  ALT  17  /  AlkPhos  76  11-20

## 2024-11-25 NOTE — PROGRESS NOTE ADULT - NEGATIVE GASTROINTESTINAL SYMPTOMS
no diarrhea/no melena/no hematochezia/no steatorrhea/no jaundice/no hiccoughs

## 2024-11-25 NOTE — PROGRESS NOTE ADULT - ASSESSMENT
Spontaneous Bacterial Peritonitis  Leukocytosis    Plan -   ·	Continue Rocephin 2gms iv q24hrs till today  ·	Then switch to Ceftin 250mgs po bid x 2-3 days more. Spontaneous Bacterial Peritonitis  Leukocytosis    Plan -   ·	Continue Rocephin 2gms iv q24hrs to complete todays dose.  ·	Then switch to Ceftin 250mgs po bid x 2-3 days more.  ·	DC planning for today.  ·	reconsult prn.

## 2024-11-27 ENCOUNTER — INPATIENT (INPATIENT)
Facility: HOSPITAL | Age: 54
LOS: 4 days | Discharge: HOME | DRG: 392 | End: 2024-12-02
Attending: STUDENT IN AN ORGANIZED HEALTH CARE EDUCATION/TRAINING PROGRAM | Admitting: STUDENT IN AN ORGANIZED HEALTH CARE EDUCATION/TRAINING PROGRAM
Payer: MEDICARE

## 2024-11-27 VITALS
HEIGHT: 71 IN | HEART RATE: 71 BPM | WEIGHT: 190.92 LBS | OXYGEN SATURATION: 100 % | RESPIRATION RATE: 17 BRPM | DIASTOLIC BLOOD PRESSURE: 99 MMHG | TEMPERATURE: 98 F | SYSTOLIC BLOOD PRESSURE: 195 MMHG

## 2024-11-27 DIAGNOSIS — I50.20 UNSPECIFIED SYSTOLIC (CONGESTIVE) HEART FAILURE: ICD-10-CM

## 2024-11-27 DIAGNOSIS — Z29.9 ENCOUNTER FOR PROPHYLACTIC MEASURES, UNSPECIFIED: ICD-10-CM

## 2024-11-27 DIAGNOSIS — Z98.890 OTHER SPECIFIED POSTPROCEDURAL STATES: Chronic | ICD-10-CM

## 2024-11-27 DIAGNOSIS — R19.7 DIARRHEA, UNSPECIFIED: ICD-10-CM

## 2024-11-27 DIAGNOSIS — I25.10 ATHEROSCLEROTIC HEART DISEASE OF NATIVE CORONARY ARTERY WITHOUT ANGINA PECTORIS: ICD-10-CM

## 2024-11-27 DIAGNOSIS — I77.0 ARTERIOVENOUS FISTULA, ACQUIRED: Chronic | ICD-10-CM

## 2024-11-27 DIAGNOSIS — K43.9 VENTRAL HERNIA WITHOUT OBSTRUCTION OR GANGRENE: ICD-10-CM

## 2024-11-27 DIAGNOSIS — R18.8 OTHER ASCITES: ICD-10-CM

## 2024-11-27 DIAGNOSIS — E11.9 TYPE 2 DIABETES MELLITUS WITHOUT COMPLICATIONS: ICD-10-CM

## 2024-11-27 DIAGNOSIS — D72.829 ELEVATED WHITE BLOOD CELL COUNT, UNSPECIFIED: ICD-10-CM

## 2024-11-27 DIAGNOSIS — N18.6 END STAGE RENAL DISEASE: ICD-10-CM

## 2024-11-27 DIAGNOSIS — K82.8 OTHER SPECIFIED DISEASES OF GALLBLADDER: ICD-10-CM

## 2024-11-27 DIAGNOSIS — I10 ESSENTIAL (PRIMARY) HYPERTENSION: ICD-10-CM

## 2024-11-27 DIAGNOSIS — R10.9 UNSPECIFIED ABDOMINAL PAIN: ICD-10-CM

## 2024-11-27 LAB
ALBUMIN SERPL ELPH-MCNC: 2.8 G/DL — LOW (ref 3.5–5)
ALP SERPL-CCNC: 84 U/L — SIGNIFICANT CHANGE UP (ref 40–120)
ALT FLD-CCNC: 17 U/L DA — SIGNIFICANT CHANGE UP (ref 10–60)
ANION GAP SERPL CALC-SCNC: 6 MMOL/L — SIGNIFICANT CHANGE UP (ref 5–17)
APPEARANCE UR: CLEAR — SIGNIFICANT CHANGE UP
AST SERPL-CCNC: 27 U/L — SIGNIFICANT CHANGE UP (ref 10–40)
BASOPHILS # BLD AUTO: 0.03 K/UL — SIGNIFICANT CHANGE UP (ref 0–0.2)
BASOPHILS NFR BLD AUTO: 0.2 % — SIGNIFICANT CHANGE UP (ref 0–2)
BILIRUB SERPL-MCNC: 0.4 MG/DL — SIGNIFICANT CHANGE UP (ref 0.2–1.2)
BILIRUB UR-MCNC: NEGATIVE — SIGNIFICANT CHANGE UP
BUN SERPL-MCNC: 61 MG/DL — HIGH (ref 7–18)
C DIFF GDH STL QL: NEGATIVE — SIGNIFICANT CHANGE UP
C DIFF GDH STL QL: SIGNIFICANT CHANGE UP
CALCIUM SERPL-MCNC: 8.8 MG/DL — SIGNIFICANT CHANGE UP (ref 8.4–10.5)
CHLORIDE SERPL-SCNC: 102 MMOL/L — SIGNIFICANT CHANGE UP (ref 96–108)
CO2 SERPL-SCNC: 27 MMOL/L — SIGNIFICANT CHANGE UP (ref 22–31)
COLOR SPEC: YELLOW — SIGNIFICANT CHANGE UP
CREAT SERPL-MCNC: 8.72 MG/DL — HIGH (ref 0.5–1.3)
DIFF PNL FLD: NEGATIVE — SIGNIFICANT CHANGE UP
EGFR: 7 ML/MIN/1.73M2 — LOW
EOSINOPHIL # BLD AUTO: 0.64 K/UL — HIGH (ref 0–0.5)
EOSINOPHIL NFR BLD AUTO: 4.9 % — SIGNIFICANT CHANGE UP (ref 0–6)
GLUCOSE BLDC GLUCOMTR-MCNC: 99 MG/DL — SIGNIFICANT CHANGE UP (ref 70–99)
GLUCOSE SERPL-MCNC: 183 MG/DL — HIGH (ref 70–99)
GLUCOSE UR QL: 250 MG/DL
HCT VFR BLD CALC: 32.6 % — LOW (ref 39–50)
HGB BLD-MCNC: 11.3 G/DL — LOW (ref 13–17)
IMM GRANULOCYTES NFR BLD AUTO: 0.8 % — SIGNIFICANT CHANGE UP (ref 0–0.9)
KETONES UR-MCNC: NEGATIVE MG/DL — SIGNIFICANT CHANGE UP
LACTATE SERPL-SCNC: 1 MMOL/L — SIGNIFICANT CHANGE UP (ref 0.7–2)
LEUKOCYTE ESTERASE UR-ACNC: NEGATIVE — SIGNIFICANT CHANGE UP
LIDOCAIN IGE QN: 64 U/L — SIGNIFICANT CHANGE UP (ref 13–75)
LYMPHOCYTES # BLD AUTO: 0.63 K/UL — LOW (ref 1–3.3)
LYMPHOCYTES # BLD AUTO: 4.9 % — LOW (ref 13–44)
MCHC RBC-ENTMCNC: 32.6 PG — SIGNIFICANT CHANGE UP (ref 27–34)
MCHC RBC-ENTMCNC: 34.7 G/DL — SIGNIFICANT CHANGE UP (ref 32–36)
MCV RBC AUTO: 93.9 FL — SIGNIFICANT CHANGE UP (ref 80–100)
MONOCYTES # BLD AUTO: 0.83 K/UL — SIGNIFICANT CHANGE UP (ref 0–0.9)
MONOCYTES NFR BLD AUTO: 6.4 % — SIGNIFICANT CHANGE UP (ref 2–14)
NEUTROPHILS # BLD AUTO: 10.72 K/UL — HIGH (ref 1.8–7.4)
NEUTROPHILS NFR BLD AUTO: 82.8 % — HIGH (ref 43–77)
NITRITE UR-MCNC: NEGATIVE — SIGNIFICANT CHANGE UP
NRBC # BLD: 0 /100 WBCS — SIGNIFICANT CHANGE UP (ref 0–0)
PH UR: 7.5 — SIGNIFICANT CHANGE UP (ref 5–8)
PLATELET # BLD AUTO: 242 K/UL — SIGNIFICANT CHANGE UP (ref 150–400)
POTASSIUM SERPL-MCNC: 4.4 MMOL/L — SIGNIFICANT CHANGE UP (ref 3.5–5.3)
POTASSIUM SERPL-SCNC: 4.4 MMOL/L — SIGNIFICANT CHANGE UP (ref 3.5–5.3)
PROT SERPL-MCNC: 7.2 G/DL — SIGNIFICANT CHANGE UP (ref 6–8.3)
PROT UR-MCNC: 100 MG/DL
RBC # BLD: 3.47 M/UL — LOW (ref 4.2–5.8)
RBC # FLD: 14.7 % — HIGH (ref 10.3–14.5)
SODIUM SERPL-SCNC: 135 MMOL/L — SIGNIFICANT CHANGE UP (ref 135–145)
SP GR SPEC: 1.01 — SIGNIFICANT CHANGE UP (ref 1–1.03)
UROBILINOGEN FLD QL: 0.2 MG/DL — SIGNIFICANT CHANGE UP (ref 0.2–1)
WBC # BLD: 12.95 K/UL — HIGH (ref 3.8–10.5)
WBC # FLD AUTO: 12.95 K/UL — HIGH (ref 3.8–10.5)

## 2024-11-27 PROCEDURE — 74176 CT ABD & PELVIS W/O CONTRAST: CPT | Mod: 26,MC

## 2024-11-27 PROCEDURE — 71045 X-RAY EXAM CHEST 1 VIEW: CPT | Mod: 26

## 2024-11-27 PROCEDURE — 78226 HEPATOBILIARY SYSTEM IMAGING: CPT | Mod: 26

## 2024-11-27 PROCEDURE — 99223 1ST HOSP IP/OBS HIGH 75: CPT | Mod: GC

## 2024-11-27 PROCEDURE — 76705 ECHO EXAM OF ABDOMEN: CPT | Mod: 26

## 2024-11-27 PROCEDURE — 99285 EMERGENCY DEPT VISIT HI MDM: CPT

## 2024-11-27 PROCEDURE — 99222 1ST HOSP IP/OBS MODERATE 55: CPT

## 2024-11-27 RX ORDER — TRAZODONE HYDROCHLORIDE 150 MG/1
100 TABLET ORAL AT BEDTIME
Refills: 0 | Status: DISCONTINUED | OUTPATIENT
Start: 2024-11-27 | End: 2024-12-02

## 2024-11-27 RX ORDER — HYDROMORPHONE HYDROCHLORIDE 2 MG/1
0.5 TABLET ORAL ONCE
Refills: 0 | Status: DISCONTINUED | OUTPATIENT
Start: 2024-11-27 | End: 2024-11-27

## 2024-11-27 RX ORDER — CARVEDILOL 25 MG/1
12.5 TABLET, FILM COATED ORAL EVERY 12 HOURS
Refills: 0 | Status: DISCONTINUED | OUTPATIENT
Start: 2024-11-27 | End: 2024-12-02

## 2024-11-27 RX ORDER — PANTOPRAZOLE SODIUM 40 MG/1
40 TABLET, DELAYED RELEASE ORAL
Refills: 0 | Status: DISCONTINUED | OUTPATIENT
Start: 2024-11-27 | End: 2024-12-02

## 2024-11-27 RX ORDER — LISINOPRIL 20 MG/1
40 TABLET ORAL DAILY
Refills: 0 | Status: DISCONTINUED | OUTPATIENT
Start: 2024-11-27 | End: 2024-12-02

## 2024-11-27 RX ORDER — ACETAMINOPHEN 500MG 500 MG/1
650 TABLET, COATED ORAL EVERY 6 HOURS
Refills: 0 | Status: DISCONTINUED | OUTPATIENT
Start: 2024-11-27 | End: 2024-12-02

## 2024-11-27 RX ORDER — NIFEDIPINE 10 MG
90 CAPSULE ORAL
Refills: 0 | Status: DISCONTINUED | OUTPATIENT
Start: 2024-11-27 | End: 2024-12-02

## 2024-11-27 RX ORDER — CEFTRIAXONE SODIUM 1 G
1000 VIAL (EA) INJECTION EVERY 24 HOURS
Refills: 0 | Status: DISCONTINUED | OUTPATIENT
Start: 2024-11-28 | End: 2024-12-02

## 2024-11-27 RX ORDER — CEFTRIAXONE SODIUM 1 G
2000 VIAL (EA) INJECTION ONCE
Refills: 0 | Status: COMPLETED | OUTPATIENT
Start: 2024-11-27 | End: 2024-11-27

## 2024-11-27 RX ORDER — NIFEDIPINE 10 MG
90 CAPSULE ORAL DAILY
Refills: 0 | Status: DISCONTINUED | OUTPATIENT
Start: 2024-11-27 | End: 2024-11-27

## 2024-11-27 RX ORDER — FAMOTIDINE 20 MG/1
20 TABLET, FILM COATED ORAL ONCE
Refills: 0 | Status: COMPLETED | OUTPATIENT
Start: 2024-11-27 | End: 2024-11-27

## 2024-11-27 RX ORDER — CLOPIDOGREL 75 MG/1
75 TABLET, FILM COATED ORAL DAILY
Refills: 0 | Status: DISCONTINUED | OUTPATIENT
Start: 2024-11-27 | End: 2024-11-28

## 2024-11-27 RX ORDER — HEPARIN SODIUM,PORCINE 1000/ML
5000 VIAL (ML) INJECTION EVERY 8 HOURS
Refills: 0 | Status: DISCONTINUED | OUTPATIENT
Start: 2024-11-27 | End: 2024-11-28

## 2024-11-27 RX ORDER — ONDANSETRON HYDROCHLORIDE 4 MG/1
4 TABLET, FILM COATED ORAL ONCE
Refills: 0 | Status: COMPLETED | OUTPATIENT
Start: 2024-11-27 | End: 2024-11-27

## 2024-11-27 RX ORDER — ONDANSETRON HYDROCHLORIDE 4 MG/1
4 TABLET, FILM COATED ORAL EVERY 8 HOURS
Refills: 0 | Status: DISCONTINUED | OUTPATIENT
Start: 2024-11-27 | End: 2024-12-02

## 2024-11-27 RX ADMIN — ONDANSETRON HYDROCHLORIDE 4 MILLIGRAM(S): 4 TABLET, FILM COATED ORAL at 09:00

## 2024-11-27 RX ADMIN — HYDROMORPHONE HYDROCHLORIDE 0.5 MILLIGRAM(S): 2 TABLET ORAL at 13:08

## 2024-11-27 RX ADMIN — CARVEDILOL 12.5 MILLIGRAM(S): 25 TABLET, FILM COATED ORAL at 20:31

## 2024-11-27 RX ADMIN — HYDROMORPHONE HYDROCHLORIDE 0.5 MILLIGRAM(S): 2 TABLET ORAL at 09:01

## 2024-11-27 RX ADMIN — TRAZODONE HYDROCHLORIDE 100 MILLIGRAM(S): 150 TABLET ORAL at 23:17

## 2024-11-27 RX ADMIN — HYDROMORPHONE HYDROCHLORIDE 0.5 MILLIGRAM(S): 2 TABLET ORAL at 22:32

## 2024-11-27 RX ADMIN — Medication 2000 MILLIGRAM(S): at 09:32

## 2024-11-27 RX ADMIN — FAMOTIDINE 20 MILLIGRAM(S): 20 TABLET, FILM COATED ORAL at 09:00

## 2024-11-27 RX ADMIN — ONDANSETRON HYDROCHLORIDE 4 MILLIGRAM(S): 4 TABLET, FILM COATED ORAL at 12:48

## 2024-11-27 RX ADMIN — Medication 100 MILLIGRAM(S): at 09:02

## 2024-11-27 RX ADMIN — HYDROMORPHONE HYDROCHLORIDE 0.5 MILLIGRAM(S): 2 TABLET ORAL at 09:31

## 2024-11-27 NOTE — H&P ADULT - ATTENDING COMMENTS
54y M with pmh of HTN, HLD, ESRD on HD (MWF) via LUE AVF, ascites (requiring repeat paracenteses every 4-6wks), NICM with moderate LV dysfunction and CAD NSTEMI 4/7/2024 s/p PCI to LAD - Mid (Atherectomy, IVL and SALLIE), intervention of LAD branch D1 (PTCA, Atherotomy, IVL and SALLIE), intervention of LCx branch LPL1 (Atherotomy, Atherectomy and SALLIE on 04/11/2024), who presents with nausea/vomiting, abdominal pain along with diarrhea. He notes that the day he came back home from the hospital this abdominal pain was not too bad. But feels like in the next day, the pain increased and feels more distended. He notes vomiting several times and saw that it was green tinged. He also notes diarrhea over the past days, he notes having spicy wings tuesday night and then an hour or so after noted his stomach pain increased and been having several diarrhea episodes after that.  Denies fevers, chills, body aches.    Of note he was recently admitted a few days ago (11/1-11/25)  for which he was found to have large vol ascites, s/p paracentesis, and tx with CTX for SBP and then transitioned to ceftin 250mg bid po for 2 more days additional. He states he took his last few doses yesterday, but also might have vomited out the medication.      HIE reviewed, he has seen cardiology recently on 10/4/24 - follows with Dr Henrry Ospina at [facility]. He had an echo on 10/1/24, EF 46%. It is noted that patient had follow up with liver clnic and underwent liver biopsy which was “normal.” Per cardiology he is to be on lisinopril 40 daily, coreg 12.5mg bid, and nifedipine 90mg on non HD days.    TTE: 10/01/2024   mild left ventricular dilatation overall   mildly decreased left ventricular systolic function (diffuse); ejection fraction (2D) = 46%   mildly increased left ventricular wall thickness   normal right ventricular size normal right ventricular function   mild valvular aortic regurgitation no evidence for valvular aortic stenosis   minimal valvular mitral regurgitation   moderate valvular tricuspid regurgitation   moderate pulmonary hypertension; estimated pulmonary artery systolic pressure = 60 mmHg   small pericardial effusion pleural effusion ascites Compared to the previous   study performed on 04/09/2024: prior images reviewed ultrasound enhancing agent   used in previous but not in current study pulmonary artery systolic pressure increased otherwise no significant change.     Vitals, labs reviewed. Wbc 12, hgb 11.3, LFTs wnl. Afebrile, initial BP noted in 190 then on repeat in 140s. UA neg   CT a/p - Moderate to severe abdominal pelvic ascites, grossly unchanged from 11/18/2024. Nonspecific gallbladder wall edema/pericholecystic fluid may be reactive. Bladder wall thickening similar to prior may reflect underdistention.    US RUQ - Gallbladder sludge. Gallbladder wall thickening and trace pericholecystic fluid may be reactive. Cholecystitis not excluded. No biliary dilatation. Ascites.   CXR – with no acute findings   PE – nontoxic appearing, NC/AT, RRR, lungs grossly clear, abd with hernia noted, mild epigastric ttp, mildly distended, +1 LE edema noted.       A/P   #Nausea/Vomiting   #Abdominal pain   #Gallbladder wall thickening   #Ascites   #HTN   #HLD   #ESRD on HD   #Diarrhea   #HFrEF   #CAD s/p PCI   #DM      -surgery consulted for CT findings of gallbladder wall thickening, planned for HIDA scan   -trend cbc, fever curve   -c/w CTX   -zofran prn for nausea, diet clears for now   -off IVF hydration given esrd   -GI consult, prior admission was planned for EGD but was not completed at that time, currently with intractable N/V again   -nephro consulted for HD   -monitor for BM, check GI PCR   -if neg for infectious diarrhea, can trial imodium for diarrhea prn   -per cardiology he is to be on lisinopril 40 daily, coreg 12.5mg bid, and nifedipine 90mg on non HD days.   -c/w asa, plavix   -may need repeat paracentesis prior to dc   -monitor FS/ISS   -dvt ppx hsq

## 2024-11-27 NOTE — ED ADULT TRIAGE NOTE - HOW PATIENT ADDRESSED, PROFILE
[FreeTextEntry1] : urinary urgency:  given hx of recurrent uti's, will check UA\par rash:  suspect fungal; however not improving with ketoconazole: change to nystatin powder. dermatology follow up Edwes

## 2024-11-27 NOTE — H&P ADULT - PROBLEM SELECTOR PLAN 2
p/w 1d history of >10 episodes of diarrhea, >5 episodes of NBNB vomiting   Supportive therapy - zofran PRN  No fluids given ESRD status   f/u C.diff, GI PCR p/w 1d history of >10 episodes of diarrhea, >5 episodes of NBNB vomiting   Supportive therapy - zofran PRN  No fluids given ESRD status   C diff neg   CLD, advance diet as tolerated   f/u GI PCR p/w 1d history of >10 episodes of diarrhea, >5 episodes of NBNB vomiting   Supportive therapy - zofran PRN (QTc 470)  No fluids given ESRD status   C diff neg   CLD, advance diet as tolerated   f/u GI PCR

## 2024-11-27 NOTE — H&P ADULT - PROBLEM SELECTOR PLAN 6
Hx of HTN on nifedipine 90mg qd, lisionpril 40mg qd, carvedilol 12.5mg bid  c/w home meds Hx of HTN on nifedipine 90mg on non-HD days, lisionpril 40mg qd, carvedilol 12.5mg bid  c/w home meds

## 2024-11-27 NOTE — H&P ADULT - PROBLEM SELECTOR PLAN 1
CT A/P: Nonspecific gallbladder wall edema/pericholecystic fluid may be reactive.  RUQ US: GB sludge. Gallbladder wall thickening and trace pericholecystic fluid  may be reactive  Nael 0.4, ALP 84  PEx: Angel's sign neg. No guarding or rebound tenderness  Surgery following  GI Dr. Cervantes consulted   f/u HIDA scan CT A/P: Nonspecific gallbladder wall edema/pericholecystic fluid may be reactive.  RUQ US: GB sludge. Gallbladder wall thickening and trace pericholecystic fluid  may be reactive  Nael 0.4, ALP 84  PEx: Angel's sign neg. No guarding or rebound tenderness  s/p ceftriaxone 2g in ED   Surgery following  GI Dr. Cervantes consulted   HIDA scan: Findings compatible with biliary obstruction. Acute cholecystitis cannot be ruled out   c/w ceftriaxone 1g   f/u MRCP

## 2024-11-27 NOTE — CONSULT NOTE ADULT - SUBJECTIVE AND OBJECTIVE BOX
GENERAL SURGERY CONSULTATION NOTE  Patient is a 54y old  Male who presents with a chief complaint of     HPI:  54M w/PMH xsignificant for DM, HTN, ESRD, anemia, recently hospitalized, discharged two days ago on Monday, comes in with nausea and vomiting. States he ate cheeseburger, a [employer] milkshake and shrimp linguine and chicken wings since he's been out of the hospital. States he has diffuse abdominal pain. Denies fever or chills at home.     REVIEW OF SYSTEMS:  Negative except stated above in HPI    PAST MEDICAL & SURGICAL HISTORY:  Type 2 diabetes mellitus  Hypertension  End stage renal disease  started HD 2019 T, Th, Sat via right chest permacath  Bone spur  right shoulder- hx of - sx done  Anemia  Injury of right wrist  hx of at age 14  History of hyperkalemia  before HD- K-6.2 - to repeat in am of sx, pt had HD today  S/P arthroscopy of right shoulder    History of vascular access device  right chest permacath - 2019  H/O right wrist surgery  tendons repair - at age 14  AV fistula    Allergies:  No Known Allergies    Vital Signs Last 24 Hrs  T(C): 36.3 (2024 12:52), Max: 36.6 (2024 07:53)  T(F): 97.4 (2024 12:52), Max: 97.9 (2024 07:53)  HR: 63 (2024 14:17) (63 - 71)  BP: 143/70 (2024 14:17) (143/70 - 195/99)  BP(mean): --  RR: 20 (2024 12:52) (17 - 20)  SpO2: 99% (2024 12:52) (99% - 100%)    Parameters below as of 2024 12:52  Patient On (Oxygen Delivery Method): room air    PHYSICAL EXAM:   General: Alert and oriented, not in acute distress  Resp: Breathing unlabored  GI: abdomen: soft, distended with ascites, tender in RUQ  : No Vigil, no dysuria or hematuria  Extremities: No pedal edema    LABS:                      11.3   12.95 )-----------( 242      ( 2024 09:00 )             32.6                  135  |  102  |  61[H]  ----------------------------<  183[H]  4.4   |  27  |  8.72[H]    Ca    8.8      2024 09:00    TPro  7.2  /  Alb  2.8[L]  /  TBili  0.4  /  DBili  x   /  AST  27  /  ALT  17  /  AlkPhos  84                Urinalysis Basic - ( 2024 10:25 )    Color: Yellow / Appearance: Clear / S.011 / pH: x  Gluc: x / Ketone: Negative mg/dL  / Bili: Negative / Urobili: 0.2 mg/dL   Blood: x / Protein: 100 mg/dL / Nitrite: Negative   Leuk Esterase: Negative / RBC: 2 /HPF / WBC 2 /HPF   Sq Epi: x / Non Sq Epi: x / Bacteria: x      RADIOLOGY & ADDITIONAL STUDIES:  < from: CT Abdomen and Pelvis No Cont (24 @ 09:41) >  FINDINGS:  LOWER CHEST: No gynecomastia. Cardiomegaly with cardiac vascular   calcification trace pericardial effusion. Left lower lobe mucoid impacted   small airways  LIVER: Within normal limits.  BILE DUCTS: Normal caliber.  GALLBLADDER: No calcified gallstones. Gallbladder wall   edema/pericholecystic fluid is nonspecific and may be reactive in this   setting. Correlate with symptomatology. If there is concern for   cholecystitis ultrasound or HIDA scan can be obtained..  SPLEEN: Within normal limits.  PANCREAS: Within normal limits.  ADRENALS: Within normal limits.  KIDNEYS/URETERS: Marked bilateral cortical thinning with extensive renal   vascular calcification. Left renal cyst no hydronephrosis..  BLADDER: Circumferential bladder wall thickening similar to prior May   reflect under distention. Correlate with urinalysis for cystitis  REPRODUCTIVE ORGANS: Enlarged prostate  BOWEL: No bowel obstruction. Appendix no appendicitis  PERITONEUM/RETROPERITONEUM: Moderate to severe abdominal pelvic ascites   grossly unchanged. No organized fluid collections or extraluminal gas..  VESSELS: Nonaneurysmal, severely atherosclerotic.  LYMPH NODES: No lymphadenopathy.  ABDOMINAL WALL: Postoperative changes. Mild generalized anasarca.  BONES: Degenerative changes.    IMPRESSION:  Moderate to severe abdominal pelvic ascites, grossly unchanged from   2024.  Nonspecific gallbladder wall edema/pericholecystic fluid may be reactive.   Correlate with symptomatology. If there is concern for cholecystitis   right upper quadrant ultrasound and/or HIDA scan can be obtained.  Bladder wall thickening similar to prior may reflect underdistention.   Correlate with urinalysis for cholecystitis.  Additional findings as discussed  --- End of Report ---  < end of copied text >

## 2024-11-27 NOTE — H&P ADULT - ASSESSMENT
Patient is a 54M with PMHx of ESRD on HD via LUE AVF (M/W/F), HFrEF (10/24 EF 45%), CAD s/p PCI, recurrent ascites (gets 10-15L removed every 5-6 weeks), HTN, DM (not on meds), HLD who presented to the hospital for intractable vomiting and diarrhea. T 97.9F, HR 66, /70, O2 99% in RA.  CT A/P showed nonspecific gallbladder wall edema/pericholecystic fluid may be reactive. Admitted for abdominal pain and intractable diarrhea.

## 2024-11-27 NOTE — H&P ADULT - PROBLEM SELECTOR PLAN 3
Hx of ascites w/ monthly paracentesis since 1/2021 (~10-15L/month)  Unclear etiology for recurrent ascites. No cirrhosis. Possibly cardiogenic  Last paracentesis on 11/19, fluid w/u showed SBP, SAAG <1.1, fluid protein >5  CT A/P:  Moderate to severe abdominal pelvic ascites, grossly unchanged from 11/18/2024  PEx: + distended, soft, non tender, no guarding or rebound tenderness. No clinical signs of SBP   s/p ceftriaxone 2g qd 11/20-11/25 and cefuroxime 250mg bid on 11/26, s/p ceftriaxone 2g in ED   Consider repeat paracentesis Hx of ascites w/ monthly paracentesis since 1/2021 (~10-15L/month)  Unclear etiology for recurrent ascites. No cirrhosis. Possibly cardiogenic  Last paracentesis on 11/19, fluid w/u showed SBP, SAAG <1.1, fluid protein >5  CT A/P:  Moderate to severe abdominal pelvic ascites, grossly unchanged from 11/18/2024  PEx: + distended, soft, non tender, no guarding or rebound tenderness. No clinical signs of SBP   s/p ceftriaxone 2g qd 11/20-11/25 and cefuroxime 250mg bid on 11/26, s/p ceftriaxone 2g in ED  c/w ceftriaxone 2g qd, stop if HIDA negative   Consider repeat paracentesis Hx of ascites w/ monthly paracentesis since 1/2021 (~10-15L/month)  Unclear etiology for recurrent ascites. No cirrhosis. Possibly cardiogenic  Last paracentesis on 11/19, fluid w/u showed SBP, SAAG <1.1, fluid protein >5  CT A/P:  Moderate to severe abdominal pelvic ascites, grossly unchanged from 11/18/2024  PEx: + distended, soft, non tender, no guarding or rebound tenderness. No clinical signs of SBP   s/p ceftriaxone 2g qd 11/20-11/25 and cefuroxime 250mg bid on 11/26, s/p ceftriaxone 2g in ED  Consider repeat paracentesis

## 2024-11-27 NOTE — H&P ADULT - NSICDXPASTSURGICALHX_GEN_ALL_CORE_FT
PAST SURGICAL HISTORY:  AV fistula     H/O right wrist surgery tendons repair - at age 14    History of vascular access device right chest permacath - 2/2019    S/P arthroscopy of right shoulder 2010     PAST SURGICAL HISTORY:  AV fistula     H/O right wrist surgery tendons repair - at age 14    H/O ventral hernia repair     History of vascular access device right chest permacath - 2/2019    S/P arthroscopy of right shoulder 2010

## 2024-11-27 NOTE — H&P ADULT - PROBLEM SELECTOR PLAN 4
Hx of ESRD on HD via LUE AVF on [facility], makes minimal urine   Nephro Dr. Bailey consulted

## 2024-11-27 NOTE — ED PROVIDER NOTE - CARE PLAN
Principal Discharge DX:	Abdominal pain, vomiting, and diarrhea   1 Principal Discharge DX:	Abdominal pain, vomiting, and diarrhea  Secondary Diagnosis:	Gallbladder sludge  Secondary Diagnosis:	ESRD on hemodialysis

## 2024-11-27 NOTE — CONSULT NOTE ADULT - SUBJECTIVE AND OBJECTIVE BOX
NEPHROLOGY MEDICAL CARE, [facility] - Dr. Domenic Griffiths/ Dr. Bishnu Amador/ Dr. Fili Smiley/ Dr. Naomie Crowder    Date of Service: 24    Patient was seen and examined at bedside.    Consultation requested by:  Unknown Doctor    Reason for Consult: End Stage Renal Disease management        HPI:  54Y/ M with PMHx of HTN, HLD, DM (not on medications currently), CHF, ESRD on HD ( L UE AVF)  MWF([facility] [place], Nephro: Dr. Griffiths), ascites( follows with Dr. Li at [facility])  last drained 10/10/2024 presented  to the with intractable abdominal pain again and pt was discharged on Monday after being treated to SBP. Patient last HD was on  as per Holiday Schedule at [facility].  He denies chest pain, shortness of breath, cough, fever, chills, diarrhea, dysuria, hematuria, flank pain, and any additional complaints at this time.  No recent travel or sick contacts.        PMH:   Type 2 diabetes mellitus    Hypertension    HLD (hyperlipidemia)    End stage renal disease    Bone spur    Anemia    Injury of right wrist    History of hyperkalemia    No pertinent past medical history        PSH:   S/P arthroscopy of right shoulder    History of vascular access device    H/O right wrist surgery    AV fistula        FAMILY HISTORY:  FH: hypertension  mother, father- alive    FH: type 2 diabetes  mother, father- alive        Social History:  non-smoker/ non-alcoholic     Home Meds:  Home Medications:  carvedilol 12.5 mg oral tablet: 1 tab(s) orally 2 times a day (2023 15:55)  Lasix 20 mg oral tablet: 1 tab(s) orally once a day (2024 02:13)  lisinopril 40 mg oral tablet: 1 tab(s) orally once a day (2024 22:28)  lovastatin 10 mg oral tablet: 1 tab(s) orally once a day (at bedtime) (2023 14:37)  NIFEdipine (Eqv-Procardia XL) 90 mg oral tablet, extended release: 1 tab(s) orally once a day (2023 14:38)  traZODone 100 mg oral tablet: 1 orally once a day (at bedtime) (2023 14:37)      Allergies:  Allergies    No Known Allergies    Intolerances        REVIEW OF SYSTEMS:  CONSTITUTIONAL: No fever; No weight loss; No fatigue  EYES: No eye pain; No visual disturbances; No discharge  ENMT:  No difficulty hearing; No tinnitus;  No vertigo; No sinus; No throat pain  NECK: No pain; No stiffness  BREASTS: No pain; No masses; No nipple discharge  RESPIRATORY: No cough; No wheezing; No chills; No hemoptysis; No shortness of breath  CARDIOVASCULAR: No chest pain; No palpitations; No dizziness; No leg swelling  GASTROINTESTINAL: No abdominal pain; No epigastric pain; nausea; vomiting; No hematemesis; No diarrhea; No constipation. No melena   GENITOURINARY: No dysuria No frequency; No hematuria; No incontinence  NEUROLOGICAL: No headaches; No memory loss; No loss of strength; No numbness; No tremors  SKIN: No itching; No burning; No rashes  ENDOCRINE: No heat or cold intolerance; No hair loss  MUSCULOSKELETAL: No joint pain or swelling; No muscle, back, or extremity pain  PSYCHIATRIC: No depression; No anxiety; No mood swings; No difficulty sleeping  HEME/LYMPH: No easy bruising; No bleeding gums  ALLERY AND IMMUNOLOGIC: No hives or eczema    Vital Signs Last 24 Hrs  T(C): 36.3 (2024 12:52), Max: 36.6 (2024 07:53)  T(F): 97.4 (2024 12:52), Max: 97.9 (2024 07:53)  HR: 66 (2024 12:52) (66 - 71)  BP: 195/99 (2024 07:53) (195/99 - 195/99)  BP(mean): --  RR: 20 (2024 12:52) (17 - 20)  SpO2: 99% (2024 12:52) (99% - 100%)    Parameters below as of 2024 12:52  Patient On (Oxygen Delivery Method): room air          PHYSICAL EXAM:  General: No acute respiratory distress.  Eyes: conjunctiva and sclera clear  ENMT: Atraumatic, Normocephalic, supple, No JVD present. Moist mucous membranes  Respiratory:   Bilaterally clear lungs; No rales, rhonchi, wheezing  Cardiovascular: S1S2+; no m/r/g  Gastrointestinal: Soft, tenderness all over, Nondistended; Bowel sounds present  Neuro:  Awake, Alert & Oriented X3, No focal deficits present.   Ext:  2+ Peripheral Pulses and No edema, No Cyanosis  Skin: No rashes  Back: No CVA tenderness.  Dialysis Access::   Left upper arm AVF thrill/bruit+         LABS:                        11.3   12.95 )-----------( 242      ( 2024 09:00 )             32.6         135  |  102  |  61[H]  ----------------------------<  183[H]  4.4   |  27  |  8.72[H]    Ca    8.8      2024 09:00    TPro  7.2  /  Alb  2.8[L]  /  TBili  0.4  /  DBili  x   /  AST  27  /  ALT  17  /  AlkPhos  84        Urinalysis Basic - ( 2024 10:25 )    Color: Yellow / Appearance: Clear / S.011 / pH: x  Gluc: x / Ketone: Negative mg/dL  / Bili: Negative / Urobili: 0.2 mg/dL   Blood: x / Protein: 100 mg/dL / Nitrite: Negative   Leuk Esterase: Negative / RBC: 2 /HPF / WBC 2 /HPF   Sq Epi: x / Non Sq Epi: x / Bacteria: x        Urine studies      Medications:

## 2024-11-27 NOTE — ED PROVIDER NOTE - OBJECTIVE STATEMENT
54-year-old male with history of HTN, HLD, CHF, ESRD on HD M/W/F, last dialyzed here when admitted 2 days ago on Monday, per patient as well as on my review of EMR discharged after presenting with abdominal pain, vomiting, during which he had large-volume paracentesis, was found to have SBP and given ceftriaxone and discharged on Ceftin, patient  was supposed to undergo endoscopy however it kept being delayed, presents with return of his symptoms. He states his symptoms are identical to that of his prior, has been having it to an extent since discharge however has required significantly since last night. Generalized abdominal cramping in character, nonradiating. Multiple episodes of NBNB emesis last night, and 2 episodes of NB diarrhea today.  is essentially anuric. Denies all other symptoms including fever, cough, chest pain, shortness of breath, urinary symptoms, change in his baseline lower extremity swelling.  took only 2 disparate doses of his antibiotic since he was discharged.

## 2024-11-27 NOTE — H&P ADULT - PROBLEM SELECTOR PLAN 5
Hx of HFrEF on carvedilol 12.5mg bid, lisinopril 40mg qd  TTE (10/24): LVEF 46%, GIIIDD, Moderate TR, Moderate pulmonary HTN, small pericardial effusion

## 2024-11-27 NOTE — H&P ADULT - HISTORY OF PRESENT ILLNESS
Patient is a 54M with PMHx of ESRD on HD via LUE AVF (M/W/F), HFrEF (10/24 EF 45%), CAD s/p PCI, recurrent ascites (gets 10-15L removed every 5-6 weeks), HTN, DM (not on meds), HLD who presented to the hospital for intractable vomiting and diarrhea. Patient was admitted 11/18 - 11/25 for SBP. Patient reports took his cefuroxime as prescribed yesterday. He ate hot chicken wings and Pina colada shake for dinner and 2h later he started having NBNB vomiting and non-bloody watery diarrhea. He reports being up all night due to diarrhea. He reports >10 episodes of diarrhea and >5 episodes of vomiting. He reports diffuse abdominal pain but more focally in the epigastric area. He denies chest pain, palpitation, SOB, fever, chills, cough arm or leg weakness.

## 2024-11-27 NOTE — H&P ADULT - PROBLEM SELECTOR PLAN 9
DVT prophylaxis - heparin SQ Hx of ventral hernia s/p repair x2  PEx: reducible hernia. No signs of incarceration

## 2024-11-27 NOTE — ED PROVIDER NOTE - PHYSICAL EXAMINATION
Afebrile, hemodynamically stable, saturating well on room air  NAD, uncomfortable but nontoxic appearing, no WOB/tachypnea, speaking full sentences  Head NCAT  EOMI grossly, anicteric  MMM  RRR, nml S1/S2, no m/r/g  Lungs CTAB, no w/r/r  Abd soft, generalized upper abdominal TTP, ND, nml BS, no bruit, no rebound or guarding, no CVAT  AAO, CN's 3-12 grossly intact  ANAYA spontaneously, bilateral symmetric 1+ pitting edema  Skin warm, dry, PVD appearance

## 2024-11-27 NOTE — PATIENT PROFILE ADULT - FALL HARM RISK - HARM RISK INTERVENTIONS

## 2024-11-27 NOTE — H&P ADULT - NSHPPHYSICALEXAM_GEN_ALL_CORE
Vital Signs Last 24 Hrs  T(C): 37.1 (27 Nov 2024 15:33), Max: 37.1 (27 Nov 2024 15:33)  T(F): 98.7 (27 Nov 2024 15:33), Max: 98.7 (27 Nov 2024 15:33)  HR: 60 (27 Nov 2024 15:33) (60 - 71)  BP: 146/70 (27 Nov 2024 15:33) (143/70 - 195/99)  BP(mean): --  RR: 20 (27 Nov 2024 15:33) (17 - 20)  SpO2: 96% (27 Nov 2024 15:33) (96% - 100%)    Parameters below as of 27 Nov 2024 15:33  Patient On (Oxygen Delivery Method): room air    GENERAL: NAD, laying comfortably in bed   HEAD:  Atraumatic, Normocephalic  EYES: EOMI, PERRLA, conjunctiva and sclera clear  ENMT: No tonsillar erythema, exudates, or enlargement; Moist mucous membranes, No lesions  NECK: Supple, normal appearance, No JVD;   NERVOUS SYSTEM:  Alert & Oriented X3,  Motor Strength 5/5 B/L upper and lower extremities, sensation intact, CN II-XII intact.   CHEST/LUNG: Lungs clear to auscultation bilaterally, No rales, rhonchi, wheezing   HEART: Regular rate and rhythm; LUSB diastolic murmur; No rubs, or gallops  ABDOMEN: +distended, umbilical hernia; Soft, Nontender, Angel's sign negative; Bowel sounds present  : No suprapubic tenderness, CVAT;   EXTREMITIES:  1+ Peripheral Pulses, 3+ B/L LE edema; No clubbing, cyanosis  LYMPH: No lymphadenopathy noted  SKIN: No rashes or lesions;  Poor capillary refill

## 2024-11-27 NOTE — ED ADULT TRIAGE NOTE - CHIEF COMPLAINT QUOTE
abdominal pain, nausea and vomiting x last night. Pt reports was admitted and discharged from here 11/25.

## 2024-11-27 NOTE — CONSULT NOTE ADULT - NS ATTEND AMEND GEN_ALL_CORE FT
Likely acute cholecystitis - HIDA read pending, but appears to not visualize the gallbladder, consistent with cholecystitis    Will plan for lap darryl on Friday if the patient can be medically optimized.

## 2024-11-27 NOTE — H&P ADULT - PROBLEM SELECTOR PROBLEM 6
Ladi Gil (: 1999) is a 24 y.o. female, ESTABLISHED patient, here for a VIRTUAL VISIT to address:    ICD-10-CM ICD-9-CM   1. Suicidal ideations  R45.851 V62.84   2. Auditory hallucination  R44.0 780.1   3. Paranoid schizophrenia ([facility]  F20.0 295.30   4. Systemic lupus erythematosus, unspecified SLE type, unspecified organ involvement status ([facility]  M32.9 710.0   5. Borderline intellectual disability  R41.83 V62.89   6. Panic attacks  F41.0 300.01   7. Anxiety about health  F41.8 300.09   8. Attention deficit hyperactivity disorder (ADHD), unspecified ADHD type  F90.9 314.01   9. Anticoagulated  Z79.01 V58.61     Assessment   Plan     #Suicidal ideations - Reports thoughts of taking unused pills, denies prior attempts; Reviewed need to discard all unnecessary prescriptions and that if misses appointment I will have to notify nonemergency police line to do welfare check. States parents (lives with) are protective factors. Denies concerns for safety . Provided with National suicide prevention lifeline and encouraged to enter contact into cell phone, available . Encouraged to utilize virtual visit same or next day visits with myself for acute mental health concerns. #Primarily auditory hallucinations with tendency toward paranoia consistent with likely #schizophrenia - uncontrolled  Main hallucination of \"scary voice\" making comments/no commands   Feels in crisis when positive symptoms uncontrolled, Also with significant negative symptoms   #Anxiety about health   With #Panic attacks   #ADHD - Formal Neuropsychiatry dx 2022  #Borderline intellectual learning disability   Reviewed risk/benefit of both preventative daily treatment and rescue (prn) medication. CSA reviewed and signed in office 22. Random UDS 22     Prior treatments:   Felt \"zombie\" like with Zoloft (3mo of use). Xanax helps with sx but reports is too sedating even with use of 1/2 of 0.25mg tablet.    CBD gummies help with sx but too sedating. Poorly tolerated Duloxetine and discontinued after short use  Poorly tolerated Prozac stating felt anxious with every dose she took, stopped before using regularly ~1mo  Tried 2 days of Atomoxetine and report notable improvement in productivity with use. Fearful that recent SOB was from medication but suspect uncontrolled asthma and encouraged to try again. Stopped Lexapro after ~1mo, 2 weeks without; Nightmares, brainzaps have restarted since discharge   Stopped Wellbutrin after ~1mo, initially thought was helpful     Current regimen and adjustments:   Lorazepam as rescue agent ONLY (previously used as AED and believes tolerated well). Goal to use less than twice weekly on average. Reviewed to avoid prolonged regular use >6wks to prevent dependence. Effective response in symptoms with one dose. Established with Therapist   Established with Psychiatrist with recommendation to manage medications, encouraged to continue with treatment plan as prescribed. Reviewed indications, safety profile of treatments. 03/28/2022 03/28/2022   1  Lorazepam 0.5 Mg Tablet 30.00  30  Ki Neg  [id number]   Matthias (0556)  0  0.50 LME  [place]  [place]     12/03/2021 12/03/2021   1  Alprazolam 0.25 Mg Tablet              Last PDMP Joe as Reviewed:  Review User Review Instant Review Result   Denzel Carreno 1/29/2023  8:12 AM Reviewed PDMP [1]     #Body mass index is 36.39 kg/m². Counseled on diet and exercise methods. Positive reinforcement provided. Wt Readings from Last 3 Encounters:   12/09/22 217 lb (98.4 kg)   10/12/22 216 lb (98 kg)   09/09/22 213 lb 3.2 oz (96.7 kg)     #GERD with esophagitis  And #Esophageal stenosis s/p dilation  And #Atrophic gastritis  Established with GI  Rec review of EGD 4/27/22 with \"LA Grade A reflux esophagitis. Benign-appearing esophageal stenosis. Dilated. Atrophic gastritis. \" and plan to increase Omeprazole to 40mg and for gastric emptying study and repeat EGD prn for retreatment     #Hx of Seizure - reports 1 prior seizure; Not on AEDs  Reports occurrence of Seizure around time of initial Lupus diagnosis (Late 2018)  Record review EEG 10/18/18: Normal EEG recorded during the awake state. No epileptiform discharges or focal abnormality was seen. This does not preclude a diagnosis of epilepsy. When first diagnosed with Lupus (Late 2018), was having significant migraines (daily), hyperpigmented rash on arms/face, and fatigue - also notes significant memory loss regarding this time of life. Reports initial symptoms improved after starting Lupus treatment (Cellcept, Plaquenil) but has had recent recurrence of neuro sx in last few months and interested in evaluation. No longer on Cellcept. MRI brain 4/25/22 with no acute infarct, hemorrhage, mass effect, or herniation. Referred to Neurology 3/28/22 Maya Pablo) - unable to schedule prior to October 2022      #mild persistent asthma - improved  Restarted daily preventative inhaler . Reviewed instructions and optimal use to improve symptoms. Given difficulty with inhalers, encouraged to find nebulizer machine to utilize nebulizer treatments when having acute illness. Follow up if use of Albuterol inhaler increases to twice weekly or more. Established with Pulmonology; Encouraged to follow up and pursue sleep study to r/o MADDY contributing to sx (STOP Bang screen 3, high risk)     Key COPD Medications               albuterol (PROVENTIL VENTOLIN) 2.5 mg /3 mL (0.083 %) nebu (Taking) 1.5 mL by Nebulization route every four (4) hours as needed for Wheezing, Shortness of Breath or Respiratory Distress. Flovent HFA 44 mcg/actuation inhaler (Taking) Take 2 Puffs by inhalation two (2) times a day. albuterol (PROVENTIL HFA, VENTOLIN HFA, PROAIR HFA) 90 mcg/actuation inhaler (Taking) Take 2 Puffs by inhalation every four (4) hours as needed.     Advair -21 mcg/actuation inhaler           #Acute DVT of LLE in setting of #Lupus   #Anticoagulated on Xarelto  Established with Rheumatology for Lupus monitoring   Taking plaquenil; Cellcept discontinued     Lab Results   Component Value Date/Time    Sed rate (ESR) 38 (H) 08/29/2022 07:57 AM     #HLD - goal <100  Reviewed Statin hx, unclear if needed but has not been using regularly. Interested to stop using given Patient's goal to reduce pill burden and continue with monitoring and focusing on management with heart healthy habits. Lab Results   Component Value Date/Time    LDL, calculated 96 08/29/2022 07:57 AM     #Vit D Def - reviewed dx and recommended supplement dosing  Lab Results   Component Value Date/Time    VITAMIN D, 25-HYDROXY 37.4 08/29/2022 07:57 AM             Orders Placed This Encounter    mycophenolate (CELLCEPT) 500 mg tablet     Sig: Take 500 mg by mouth four (4) times daily. pantoprazole (PROTONIX) 40 mg tablet    ondansetron (ZOFRAN ODT) 4 mg disintegrating tablet    predniSONE (DELTASONE) 5 mg tablet     Sig: Take 5 mg by mouth four (4) times daily. Return for visit as already scheduled. Subjective   Last PCP visit: 12/9/2022  See A/P     Current Outpatient Medications   Medication Instructions    Advair -21 mcg/actuation inhaler No dose, route, or frequency recorded. albuterol (PROVENTIL HFA, VENTOLIN HFA, PROAIR HFA) 90 mcg/actuation inhaler 2 Puffs, Inhalation, EVERY 4 HOURS AS NEEDED    albuterol (PROVENTIL VENTOLIN) 1.25 mg, Nebulization, EVERY 4 HOURS AS NEEDED    brexpiprazole (REXULTI) 0.25 mg tab tablet Starting dose:  Will start with low dose of 0.25mg once daily until follow up with Psychiatry 12/13/22    Flovent HFA 44 mcg/actuation inhaler 2 Puffs, 2 TIMES DAILY    fluticasone propionate (FLONASE) 50 mcg/actuation nasal spray 2 Sprays, Both Nostrils, DAILY    hydrOXYchloroQUINE (PLAQUENIL) 200 mg, Oral, DAILY    loratadine (CLARITIN) 10 mg tablet loratadine 10 mg tablet  Take 1 tablet every day by oral route    LORazepam (ATIVAN) 1 mg, Oral, DAILY AS NEEDED    mycophenolate (CELLCEPT) 500 mg, Oral, 4 TIMES DAILY    omeprazole (PRILOSEC) 40 mg, Oral, DAILY    ondansetron (ZOFRAN ODT) 4 mg disintegrating tablet No dose, route, or frequency recorded. pantoprazole (PROTONIX) 40 mg tablet No dose, route, or frequency recorded. predniSONE (DELTASONE) 5 mg, Oral, 4 TIMES DAILY    Xarelto 10 mg, Oral, DAILY WITH DINNER      Allergies   Allergen Reactions    Cephalexin Other (comments)     Patient c/o severe chest pain      Inapsine [Droperidol] Other (comments)     Confusion, light headedness, neck dystonia, responded to benadryl     Metronidazole Other (comments)    Pcn [Penicillins] Hives and Itching      Objective   There were no vitals taken for this visit. Physical Exam  Nursing note reviewed. Constitutional:       General: She is not in acute distress. Pulmonary:      Effort: Pulmonary effort is normal. No respiratory distress. Neurological:      Mental Status: She is alert and oriented to person, place, and time. Psychiatric:         Attention and Perception: Attention normal. She perceives auditory hallucinations. Mood and Affect: Mood is anxious. Affect is tearful. Speech: Speech normal.         Behavior: Behavior normal.         Thought Content: Thought content is paranoid. Veronica Encinas, who was evaluated through a synchronous (real-time) audio-video encounter, and/or her healthcare decision maker, is aware that it is a billable service, which includes applicable co-pays, with coverage as determined by her insurance carrier. She provided verbal consent to proceed and patient identification was verified. This visit was conducted pursuant to the emergency declaration under the [address] [place] [address], [address] [place] [address] authority and the [email or web address] and [employer] General Act. A caregiver was present when appropriate.  Ability to conduct physical exam was limited. The patient was located at home in a state where the provider was licensed to provide care.    Corie Anderson DO  Family Medicine  01/04/2023 HTN (hypertension)

## 2024-11-27 NOTE — ED PROVIDER NOTE - CLINICAL SUMMARY MEDICAL DECISION MAKING FREE TEXT BOX
No chest pain/shortness of breath and character low suspicion for ACS to warrant cardiac workup, and ECG ___________. Character, exam, appearance low suspicion for mesenteric ischemia, AAA, dissection to warrant CT angio imaging. Patient with no urinary symptoms or CVAT and low suspicion for urinary process or pyelonephritis. Abdomen with generalized tenderness without focality. Nondistended, does not require repeat paracentesis at this time. Patient was not compliant with his SBP antibiotics and given dose of ceftriaxone here. Exam nonperitoneal as well. Character/exam low suspicion for acute intra-abdominal process, and CT _______ No chest pain/shortness of breath and character low suspicion for ACS to warrant cardiac workup, and ECG nonacute. Character, exam, appearance low suspicion for mesenteric ischemia, AAA, dissection to warrant CT angio imaging. Patient with no urinary symptoms or CVAT and low suspicion for urinary process or pyelonephritis. Abdomen with generalized tenderness without focality. Nondistended, does not require repeat paracentesis at this time. Patient was not compliant with his SBP antibiotics and given dose of ceftriaxone here. Exam nonperitoneal as well. Character/exam low suspicion for acute intra-abdominal process, and CT _______ No chest pain/shortness of breath and character low suspicion for ACS to warrant cardiac workup, and ECG nonacute. Character, exam, appearance low suspicion for mesenteric ischemia, AAA, dissection to warrant CT angio imaging. Patient with no urinary symptoms or CVAT and low suspicion for urinary process or pyelonephritis. Abdomen with generalized tenderness without focality. Nondistended, does not require repeat paracentesis at this time. Patient was not compliant with his SBP antibiotics and given dose of ceftriaxone here. Exam nonperitoneal as well. Character/exam low suspicion for acute intra-abdominal process. CT/US concerning for possible cholecystitis. Seen by Dr. Aye mcdaniel and will dialyze; no acute need at this time. Seen by surgery and requests medical admission and will perform HIDA scan. NAD, well appearing, hemodynamically stable.

## 2024-11-27 NOTE — H&P ADULT - PROBLEM SELECTOR PLAN 7
Hx of 3 vessel CAD s/p PCI to LAD-mid, D1, LCx (SALLIE) on 4/24 on aspirin 81mg qd and plavix 75mg qd   c/w home meds

## 2024-11-27 NOTE — H&P ADULT - NSHPREVIEWOFSYSTEMS_GEN_ALL_CORE
CONSTITUTIONAL: No fever, weight loss, or fatigue  EYES: No eye pain, visual disturbances, or discharge  ENT:  No difficulty hearing, tinnitus, vertigo; No sinus or throat pain  NECK: No pain or stiffness  RESPIRATORY: No cough, wheezing, chills or hemoptysis; No Shortness of Breath  CARDIOVASCULAR: No chest pain, palpitations, passing out, dizziness, or leg swelling  GASTROINTESTINAL: +nausea/diarrhea, epigastric pain, diarrhea; No abdominal pain. No hematemesis; No constipation. No melena or hematochezia.  GENITOURINARY: No dysuria, frequency, hematuria, or incontinence  NEUROLOGICAL: No headaches, memory loss, loss of strength, numbness, or tremors  SKIN: No itching, burning, rashes, or lesions   LYMPH Nodes: No enlarged glands  MUSCULOSKELETAL: No joint pain or swelling; No muscle, back, or extremity pain  HEME/LYMPH: No easy bruising, or bleeding gums

## 2024-11-27 NOTE — PATIENT PROFILE ADULT - FUNCTIONAL ASSESSMENT - BASIC MOBILITY 2.
Clinical Research Services  August 26, 2022  11:30 AM    Asked by Dr. Grace Raza to evaluate the patient for protocol [employer] [id number]: A Multicenter, Prospective, Niifpspco-jvoref-qrlhelx Observational Study Evaluating Immunoassay Measurements of Pancreatic Stone Protein Performed on [employer]'s abioSCOPE Device with the PSP Assay on ICU Patients at Risk of Sepsis as an Aid in Identifying Sepsis. IRB #  [id number]                     NCT # [id number]  [x] Patient met inclusion/exclusion criteria  [x] Patients children given consent for review  [x] Patients children read study consent. The four children present were all in agreement with study participation. Questions answered. Consent signed by daughter Adrien Golden. [x] A copy of the signed consents given to the patients family. [x] Patient number:   [x] Baseline labs obtained  [x] Patients children educated on purpose of the abioSCOPE device and the PSP assay for use as an aid in identifying sepsis in those patients at risk for sepsis. Patients children verbalizes understanding. Matthew Medley RN  Clinical Research Nurse     Bernardino Cortez MD    Patient's daughter provided informed consent for inclusion into study. Blood draw obtained for testing. Patient did not experience any local adverse events secondary to blood draw from arterial line.     Bernardino Cortez MD
4 = No assist / stand by assistance

## 2024-11-27 NOTE — ED ADULT NURSE NOTE - OBJECTIVE STATEMENT
Pt c/o abdominal pain, nausea, vomiting and diarrhea since last night. Reports being discharged from the hospital on 11/25/24. On Dialysis Monday, Wednesday, Friday, AV fistula to left arm noted.

## 2024-11-28 DIAGNOSIS — Z01.818 ENCOUNTER FOR OTHER PREPROCEDURAL EXAMINATION: ICD-10-CM

## 2024-11-28 LAB
ALBUMIN SERPL ELPH-MCNC: 2.5 G/DL — LOW (ref 3.5–5)
ALP SERPL-CCNC: 280 U/L — HIGH (ref 40–120)
ALT FLD-CCNC: 217 U/L DA — HIGH (ref 10–60)
ANION GAP SERPL CALC-SCNC: 7 MMOL/L — SIGNIFICANT CHANGE UP (ref 5–17)
AST SERPL-CCNC: 343 U/L — HIGH (ref 10–40)
BASOPHILS # BLD AUTO: 0.04 K/UL — SIGNIFICANT CHANGE UP (ref 0–0.2)
BASOPHILS NFR BLD AUTO: 0.4 % — SIGNIFICANT CHANGE UP (ref 0–2)
BILIRUB SERPL-MCNC: 2.3 MG/DL — HIGH (ref 0.2–1.2)
BUN SERPL-MCNC: 71 MG/DL — HIGH (ref 7–18)
CALCIUM SERPL-MCNC: 8.7 MG/DL — SIGNIFICANT CHANGE UP (ref 8.4–10.5)
CHLORIDE SERPL-SCNC: 102 MMOL/L — SIGNIFICANT CHANGE UP (ref 96–108)
CO2 SERPL-SCNC: 25 MMOL/L — SIGNIFICANT CHANGE UP (ref 22–31)
CREAT SERPL-MCNC: 10 MG/DL — HIGH (ref 0.5–1.3)
EGFR: 6 ML/MIN/1.73M2 — LOW
EOSINOPHIL # BLD AUTO: 0.72 K/UL — HIGH (ref 0–0.5)
EOSINOPHIL NFR BLD AUTO: 7.7 % — HIGH (ref 0–6)
GLUCOSE SERPL-MCNC: 144 MG/DL — HIGH (ref 70–99)
HCT VFR BLD CALC: 30.7 % — LOW (ref 39–50)
HGB BLD-MCNC: 10.5 G/DL — LOW (ref 13–17)
IMM GRANULOCYTES NFR BLD AUTO: 0.6 % — SIGNIFICANT CHANGE UP (ref 0–0.9)
LYMPHOCYTES # BLD AUTO: 0.62 K/UL — LOW (ref 1–3.3)
LYMPHOCYTES # BLD AUTO: 6.6 % — LOW (ref 13–44)
MAGNESIUM SERPL-MCNC: 2.1 MG/DL — SIGNIFICANT CHANGE UP (ref 1.6–2.6)
MCHC RBC-ENTMCNC: 31.9 PG — SIGNIFICANT CHANGE UP (ref 27–34)
MCHC RBC-ENTMCNC: 34.2 G/DL — SIGNIFICANT CHANGE UP (ref 32–36)
MCV RBC AUTO: 93.3 FL — SIGNIFICANT CHANGE UP (ref 80–100)
MONOCYTES # BLD AUTO: 0.63 K/UL — SIGNIFICANT CHANGE UP (ref 0–0.9)
MONOCYTES NFR BLD AUTO: 6.7 % — SIGNIFICANT CHANGE UP (ref 2–14)
NEUTROPHILS # BLD AUTO: 7.31 K/UL — SIGNIFICANT CHANGE UP (ref 1.8–7.4)
NEUTROPHILS NFR BLD AUTO: 78 % — HIGH (ref 43–77)
NRBC # BLD: 0 /100 WBCS — SIGNIFICANT CHANGE UP (ref 0–0)
PHOSPHATE SERPL-MCNC: 4.4 MG/DL — SIGNIFICANT CHANGE UP (ref 2.5–4.5)
PLATELET # BLD AUTO: 247 K/UL — SIGNIFICANT CHANGE UP (ref 150–400)
POTASSIUM SERPL-MCNC: 4.5 MMOL/L — SIGNIFICANT CHANGE UP (ref 3.5–5.3)
POTASSIUM SERPL-SCNC: 4.5 MMOL/L — SIGNIFICANT CHANGE UP (ref 3.5–5.3)
PROT SERPL-MCNC: 6.8 G/DL — SIGNIFICANT CHANGE UP (ref 6–8.3)
RBC # BLD: 3.29 M/UL — LOW (ref 4.2–5.8)
RBC # FLD: 14.8 % — HIGH (ref 10.3–14.5)
SODIUM SERPL-SCNC: 134 MMOL/L — LOW (ref 135–145)
WBC # BLD: 9.38 K/UL — SIGNIFICANT CHANGE UP (ref 3.8–10.5)
WBC # FLD AUTO: 9.38 K/UL — SIGNIFICANT CHANGE UP (ref 3.8–10.5)

## 2024-11-28 PROCEDURE — 99232 SBSQ HOSP IP/OBS MODERATE 35: CPT | Mod: 57

## 2024-11-28 PROCEDURE — 99233 SBSQ HOSP IP/OBS HIGH 50: CPT | Mod: GC

## 2024-11-28 RX ORDER — CLOPIDOGREL 75 MG/1
75 TABLET, FILM COATED ORAL ONCE
Refills: 0 | Status: DISCONTINUED | OUTPATIENT
Start: 2024-11-28 | End: 2024-11-28

## 2024-11-28 RX ORDER — HEPARIN SODIUM,PORCINE 1000/ML
5000 VIAL (ML) INJECTION EVERY 8 HOURS
Refills: 0 | Status: DISCONTINUED | OUTPATIENT
Start: 2024-11-28 | End: 2024-11-29

## 2024-11-28 RX ADMIN — PANTOPRAZOLE SODIUM 40 MILLIGRAM(S): 40 TABLET, DELAYED RELEASE ORAL at 06:01

## 2024-11-28 RX ADMIN — Medication 81 MILLIGRAM(S): at 12:37

## 2024-11-28 RX ADMIN — Medication 90 MILLIGRAM(S): at 06:00

## 2024-11-28 RX ADMIN — CARVEDILOL 12.5 MILLIGRAM(S): 25 TABLET, FILM COATED ORAL at 17:52

## 2024-11-28 RX ADMIN — LISINOPRIL 40 MILLIGRAM(S): 20 TABLET ORAL at 06:00

## 2024-11-28 RX ADMIN — Medication 100 MILLIGRAM(S): at 16:59

## 2024-11-28 RX ADMIN — CARVEDILOL 12.5 MILLIGRAM(S): 25 TABLET, FILM COATED ORAL at 06:00

## 2024-11-28 RX ADMIN — TRAZODONE HYDROCHLORIDE 100 MILLIGRAM(S): 150 TABLET ORAL at 22:09

## 2024-11-28 NOTE — PROGRESS NOTE ADULT - SUBJECTIVE AND OBJECTIVE BOX
Vital Signs Last 24 Hrs  T(F): 97.3 (11-28-24 @ 12:22), Max: 98.7 (11-27-24 @ 15:33)  HR: 61 (11-28-24 @ 12:22)  BP: 108/61 (11-28-24 @ 12:22)  RR: 18 (11-28-24 @ 12:22)  SpO2: 94% (11-28-24 @ 12:22)    I&O's Detail    LABS:                        11.3   12.95 )-----------( 242      ( 27 Nov 2024 09:00 )             32.6     11-27    135  |  102  |  61[H]  ----------------------------<  183[H]  4.4   |  27  |  8.72[H]    Ca    8.8      27 Nov 2024 09:00    TPro  7.2  /  Alb  2.8[L]  /  TBili  0.4  /  DBili  x   /  AST  27  /  ALT  17  /  AlkPhos  84  11-27    RADIOLOGY:  < from: [facility] (11.27.24 @ 20:07) >  IMPRESSION: Abnormal hepatobiliary scan.    Findings compatible with biliary obstruction.    Underlying acute cholcystitis is not excluded.    Report was discussed with Dr. Hines on 11/27/2024, at 8:30 PM, by telephone   with read back.    --- End of Report ---    LUDIN PAZ MD; Nuclear Medicine Attending  This document has been electronically signed. Nov 27 2024  8:33PM    < end of copied text >

## 2024-11-28 NOTE — PROGRESS NOTE ADULT - PROBLEM SELECTOR PLAN 5
Hx of HFrEF on carvedilol 12.5mg bid, lisinopril 40mg qd  TTE (10/24): LVEF 46%, GIIIDD, Moderate TR, Moderate pulmonary HTN, small pericardial effusion Hx of ESRD on HD via LUE AVF on M[facility]W/F [facility], makes minimal urine   Nephro Dr. Bailey consulted  Unable to perform HD on admission as patient was held in HIDA scan  B/L LE edema +1, Lungs CTA, mild ascites  f/u Neph for need of HD Hx of ESRD on HD via LUE AVF on M/W/F @[facility], makes minimal urine   Nephro Dr. Bailey consulted  Unable to perform HD on admission as patient was held in HIDA scan  B/L LE edema +1, Lungs CTA, mild ascites  per Neph, will receive HD today if surgery approved for tomorrow

## 2024-11-28 NOTE — PROGRESS NOTE ADULT - PROBLEM SELECTOR PLAN 9
Hx of ventral hernia s/p repair x2  PEx: reducible hernia. No signs of incarceration Hx of T2DM not on medication  Monitor FS AC QHS

## 2024-11-28 NOTE — PROGRESS NOTE ADULT - PROBLEM SELECTOR PLAN 1
CT A/P: Nonspecific gallbladder wall edema/pericholecystic fluid may be reactive.  RUQ US: GB sludge. Gallbladder wall thickening and trace pericholecystic fluid  may be reactive  Nael 0.4, ALP 84  PEx: Angel's sign neg. No guarding or rebound tenderness  s/p ceftriaxone 2g in ED   Surgery following  GI Dr. Cervantes consulted   HIDA scan: Findings compatible with biliary obstruction. Acute cholecystitis cannot be ruled out   c/w ceftriaxone 1g   f/u MRCP CT A/P: Nonspecific gallbladder wall edema/pericholecystic fluid may be reactive.  RUQ US: GB sludge. Gallbladder wall thickening and trace pericholecystic fluid  may be reactive  Nael 0.4, ALP 84  PEx: Angel's sign neg. No guarding or rebound tenderness  s/p ceftriaxone 2g in ED       GI Dr. Cervantes consulted   HIDA scan: Findings compatible with biliary obstruction. Acute cholecystitis cannot be ruled out   c/w ceftriaxone 1g   f/u MRCP  Surgery following: for lap darryl 11/29   preop labs, NPO after MN, preop risk stratification  HOLD ASA, PLAVIX, HEPARIN in preparation for procedure tomorrow CT A/P: Nonspecific gallbladder wall edema/pericholecystic fluid may be reactive.  RUQ US: GB sludge. Gallbladder wall thickening and trace pericholecystic fluid  may be reactive  Nael 0.4, ALP 84  PEx: Angel's sign neg. No guarding or rebound tenderness  s/p ceftriaxone 2g in ED       GI Dr. Cervantes consulted   HIDA scan: Findings compatible with biliary obstruction. Acute cholecystitis cannot be ruled out   c/w ceftriaxone 1g   Surgery following: for lap darryl 11/29   preop labs, NPO after MN, preop risk stratification  HOLD ASA & PLAVIX in preparation for procedure tomorrow CT A/P: Nonspecific gallbladder wall edema/pericholecystic fluid may be reactive.  RUQ US: GB sludge. Gallbladder wall thickening and trace pericholecystic fluid  may be reactive  Nael 0.4, ALP 84  PEx: Angel's sign neg. No guarding or rebound tenderness  s/p ceftriaxone 2g in ED       GI Dr. Cervantes consulted   HIDA scan: Findings compatible with biliary obstruction. Acute cholecystitis cannot be ruled out   c/w ceftriaxone 1g   Surgery following: for lap darryl 11/29   preop labs, NPO after MN, preop risk stratification  HOLD ASA & PLAVIX in preparation for potential procedure tomorrow

## 2024-11-28 NOTE — PROGRESS NOTE ADULT - ASSESSMENT
Patient is a 54M with PMHx of ESRD on HD via LUE AVF (M/W/F), HFrEF (10/24 EF 45%), CAD s/p PCI, recurrent ascites (gets 10-15L removed every 5-6 weeks), HTN, DM (not on meds), HLD who presented to the hospital for intractable vomiting and diarrhea. T 97.9F, HR 66, /70, O2 99% in RA.  CT A/P showed nonspecific gallbladder wall edema/pericholecystic fluid may be reactive. Admitted for abdominal pain and intractable diarrhea.     Patient is a 54M with PMHx of ESRD on HD via LUE AVF (M/W/F), HFrEF (10/24 EF 45%), CAD s/p PCI, recurrent ascites (gets 10-15L removed every 5-6 weeks), HTN, DM (not on meds), HLD who presented to the hospital for intractable vomiting and diarrhea. T 97.9F, HR 66, /70, O2 99% in RA.  CT A/P showed nonspecific gallbladder wall edema/pericholecystic fluid may be reactive. Admitted for abdominal pain and intractable diarrhea. HIDA scan positive    Patient is a 54M with PMHx of ESRD on HD via LUE AVF (M/W/F), HFrEF (10/24 EF 45%), CAD s/p PCI, recurrent ascites (gets 10-15L removed every 5-6 weeks), HTN, DM (not on meds), HLD who presented to the hospital for intractable vomiting and diarrhea. T 97.9F, HR 66, /70, O2 99% in RA.  CT A/P showed nonspecific gallbladder wall edema/pericholecystic fluid may be reactive. Admitted for abdominal pain and intractable diarrhea. HIDA scan positive for biliary obstruction and acute cholecystitis and scheduled for potential lap darryl 11/29.

## 2024-11-28 NOTE — PROGRESS NOTE ADULT - PROBLEM SELECTOR PLAN 3
Hx of ascites w/ monthly paracentesis since 1/2021 (~10-15L/month)  Unclear etiology for recurrent ascites. No cirrhosis. Possibly cardiogenic  Last paracentesis on 11/19, fluid w/u showed SBP, SAAG <1.1, fluid protein >5  CT A/P:  Moderate to severe abdominal pelvic ascites, grossly unchanged from 11/18/2024  PEx: + distended, soft, non tender, no guarding or rebound tenderness. No clinical signs of SBP   s/p ceftriaxone 2g qd 11/20-11/25 and cefuroxime 250mg bid on 11/26, s/p ceftriaxone 2g in ED  Consider repeat paracentesis p/w 1d history of >10 episodes of diarrhea, >5 episodes of NBNB vomiting   Supportive therapy - zofran PRN (QTc 470)  No fluids given ESRD status   C diff neg   CLD, advance diet as tolerated     11/28; denies N/V, or further episodes of diarrhea  will continue to monitor BM  f/u GI PCR p/w 1d history of >10 episodes of diarrhea, >5 episodes of NBNB vomiting   Supportive therapy - zofran PRN (QTc 470)  No fluids given ESRD status   C diff neg   CLD, advance diet as tolerated     11/28; denies N/V, or further episodes of diarrhea  will continue to monitor BM  f/u GI PCR  will be NPO after MN pending possible surgical intervention tomorrow

## 2024-11-28 NOTE — PROGRESS NOTE ADULT - PROBLEM SELECTOR PLAN 6
Hx of HTN on nifedipine 90mg on non-HD days, lisionpril 40mg qd, carvedilol 12.5mg bid  c/w home meds Hx of HFrEF on carvedilol 12.5mg bid, lisinopril 40mg qd  TTE (10/24): LVEF 46%, GIIIDD, Moderate TR, Moderate pulmonary HTN, small pericardial effusion  mild pitting edema in B/L LE  c/w home meds Hx of HFrEF on carvedilol 12.5mg bid, lisinopril 40mg qd  TTE (10/24): LVEF 46%, GIIIDD, Moderate TR, Moderate pulmonary HTN, small pericardial effusion  mild pitting edema in B/L LE  Cardio Dr. Quick following  c/w home meds

## 2024-11-28 NOTE — PROGRESS NOTE ADULT - PROBLEM SELECTOR PLAN 7
Hx of 3 vessel CAD s/p PCI to LAD-mid, D1, LCx (SALLIE) on 4/24 on aspirin 81mg qd and plavix 75mg qd   c/w home meds Hx of HTN on nifedipine 90mg on non-HD days, lisionpril 40mg qd, carvedilol 12.5mg bid  c/w home meds

## 2024-11-28 NOTE — CONSULT NOTE ADULT - SUBJECTIVE AND OBJECTIVE BOX
[  ] STAT REQUEST              [ X ] ROUTINE REQUEST    Patient is a 54 year old male with abdominal pain, diarrhea and vomiting. GI consulted to evaluate.        HPI:  Patient is a 54 year old male with past medical history significant for HTN, DM, Hyperlipidemia, ESRD on HD, CAD, CHF recently treated for ascites with SBP presented to the emergency room with 2 days history of watery, non bloody diarrhea associated with intermittent diffuse 8/10 intensity non radiating crampy abdominal pain and intractable non bloody vomiting. Patient reported >10 episodes of diarrhea and >5 episodes of vomiting. Patient denies hematemesis, hematochezia, melena, fever, chills, chest pain, SOOB, cough, hematuria, dysuria or recent traveling.l      PAIN MANAGEMENT:  Pain Scale:                 8/10  Pain Location:  Diffuse abdominal pain         PAST MEDICAL HISTORY    Type 2 diabetes mellitus    Hypertension    HLD (hyperlipidemia)    ESRD on HD    Bone spur    Anemia    Injury of right wrist         PAST SURGICAL HISTORY    Arthroscopy of right shoulder    Vascular access device    Right wrist surgery    AV fistula    Ventral hernia repair        Allergies    No Known Allergies    Intolerances  None         MEDICATIONS  (STANDING):  carvedilol 12.5 milliGRAM(s) Oral every 12 hours  cefTRIAXone   IVPB 1000 milliGRAM(s) IV Intermittent every 24 hours  heparin   Injectable 5000 Unit(s) SubCutaneous every 8 hours  lisinopril 40 milliGRAM(s) Oral daily  NIFEdipine XL 90 milliGRAM(s) Oral <User Schedule>  pantoprazole    Tablet 40 milliGRAM(s) Oral before breakfast  traZODone 100 milliGRAM(s) Oral at bedtime    MEDICATIONS  (PRN):  acetaminophen     Tablet .. 650 milliGRAM(s) Oral every 6 hours PRN Temp greater or equal to 38C (100.4F), Mild Pain (1 - 3)  ondansetron Injectable 4 milliGRAM(s) IV Push every 8 hours PRN Nausea and/or Vomiting      SOCIAL HISTORY  Advanced Directives:       [ X ] Full Code       [  ] DNR  Marital Status:         [  ] M      [  X] S      [  ] D       [  ] W  Children:       [ X ] Yes      [  ] No  Occupation:        [  ] Employed       [ X ] Unemployed       [  ] Retired  Diet:       [ X ] Regular       [  ] PEG feeding          [  ] NG tube feeding  Drug Use:           [ X ] Patient denied          [  ] Yes  Alcohol:           [ X ] No             [  ] Yes (socially)         [  ] Yes (chronic)  Tobacco:           [  ] Yes           [ X ] No        FAMILY HISTORY  [ X ] Heart Disease            [ X ] Diabetes             [ X ] HTN             [  ] Colon Cancer             [  ] Stomach Cancer              [  ] Pancreatic Cancer      VITAL SIGNS   Vital Signs Last 24 Hrs  T(C): 36.7 (11-28-24 @ 06:07), Max: 37.1 (11-27-24 @ 15:33)  T(F): 98 (11-28-24 @ 06:07), Max: 98.7 (11-27-24 @ 15:33)  HR: 61 (11-28-24 @ 06:07) (60 - 66)  BP: 175/72 (11-28-24 @ 06:07) (143/70 - 188/84)   RR: 18 (11-28-24 @ 06:07) (18 - 20)  SpO2: 97% (11-28-24 @ 06:07) (95% - 99%)          CBC Full  -  ( 27 Nov 2024 09:00 )  WBC Count : 12.95 K/uL  RBC Count : 3.47 M/uL  Hemoglobin : 11.3 g/dL  Hematocrit : 32.6 %  Platelet Count - Automated : 242 K/uL  Mean Cell Volume : 93.9 fl  Mean Cell Hemoglobin : 32.6 pg  Mean Cell Hemoglobin Concentration : 34.7 g/dL  Auto Neutrophil # : 10.72 K/uL  Auto Lymphocyte # : 0.63 K/uL  Auto Monocyte # : 0.83 K/uL  Auto Eosinophil # : 0.64 K/uL  Auto Basophil # : 0.03 K/uL  Auto Neutrophil % : 82.8 %  Auto Lymphocyte % : 4.9 %  Auto Monocyte % : 6.4 %  Auto Eosinophil % : 4.9 %  Auto Basophil % : 0.2 %      11-27    135  |  102  |  61[H]  ----------------------------<  183[H]  4.4   |  27  |  8.72[H]    Ca    8.8      27 Nov 2024 09:00    TPro  7.2  /  Alb  2.8[L]  /  TBili  0.4  /  DBili  x   /  AST  27  /  ALT  17  /  AlkPhos  84  11-27     Urinalysis with Rflx Culture (11.27.24 @ 10:25)   Urine Appearance: Clear  Color: Yellow  Specific Gravity: 1.011  pH Urine: 7.5  Protein, Urine: 100 mg/dL  Glucose Qualitative, Urine: 250 mg/dL  Ketone - Urine: Negative mg/dL  Blood, Urine: Negative  Bilirubin: Negative  Urobilinogen: 0.2 mg/dL  Leukocyte Esterase Concentration: Negative  Nitrite: Negative    ECG    Ventricular Rate 58 BPM    Atrial Rate 58 BPM    P-R Interval 228 ms    QRS Duration 110 ms    Q-T Interval 506 ms    QTC Calculation(Bazett) 496 ms    P Axis 23 degrees    R Axis -59 degrees    T Axis 90 degrees    Diagnosis Line Sinus bradycardiawith 1st degree A-V block  Left anterior fascicular block  Anterior infarct , age undetermined  Abnormal ECG               RADIOLOGY/IMAGING                  ACC: [id number] EXAM:  NM HEPATOBILIARY IMG   ORDERED BY:  SO POWERS     PROCEDURE DATE:  11/27/2024          INTERPRETATION:  CLINICAL INFORMATION: Abdominal pain. Gallbladder wall   thickening and trace pericholecystic fluid. Evaluate for acute   cholecystitis.    DURATION of DYNAMIC SERIES: 60 minutes  RADIOPHARMACEUTICAL: 3.4 mCi Tc-99m-Mebrofenin, I.V.  PHARMACOLOGIC INTERVENTION: None.    TECHNIQUE: Dynamic imaging of the anterior abdomen was performed for 60   minutes, followed by a static image of the anterior abdomen at 4 hours   post-injection.    COMPARISON: None    OTHER STUDIES USED FOR CORRELATION: Abdominal ultrasound and CT of   abdomen and pelvis dated 11/27/2024    FINDINGS: There is prompt, homogeneous uptake of radiotracer by the   hepatocytes. There is no biliary to bowel transit on imaging performed up   to 4 hours post injection. The bowel and gallbladder are not visualized.   There is retention of activity in the liver at the end of the study.    IMPRESSION: Abnormal hepatobiliary scan.    Findings compatible with biliary obstruction.    Underlying acute cholcystitis is not excluded.    Report was discussed with Dr. Hines on 11/27/2024, at 8:30 PM, by telephone   with read back.          ACC: [id number] EXAM:  US ABDOMEN RT UPR QUADRANT   ORDERED BY: JUAN DAVIS     PROCEDURE DATE:  11/27/2024          INTERPRETATION:  CLINICAL INFORMATION: Abdominal pain. Abnormal   gallbladder on CT of the same day    COMPARISON: CT abdomen and pelvis 11/27/2024    TECHNIQUE: Sonography of the right upper quadrant.    FINDINGS:  Liver: Within normal limits.  Bile ducts: Normal caliber. Common bile duct measures 5 mm.  Gallbladder: No gallstones. Gallbladder sludge. Diffuse gallbladder wall   thickening and trace pericholecystic fluid. This is nonspecific and may   be reactive . Cholecystitis not excluded. Correlate with symptomatology.   If warranted HIDA scan can be obtained.  Pancreas: Not adequately visualized  Right kidney: 10.7 cm. No hydronephrosis. Cortical thinning  Ascites: Moderate to large ascites  IVC: Visualized portions are within normal limits.    IMPRESSION:  Gallbladder sludge. Gallbladder wall thickening and trace pericholecystic   fluid  may be reactive. Cholecystitis not excluded. Therefore HIDA scan   can be obtained. No biliary dilatation.  Ascites..      ACC: [id number] EXAM:  CT ABDOMEN AND PELVIS   ORDERED BY: JUAN DAVIS     PROCEDURE DATE:  11/27/2024          INTERPRETATION:  CLINICAL INFORMATION: Generalized abdominal pain    COMPARISON: CT abdomen and pelvis 11/18/2024    CONTRAST/COMPLICATIONS:  IV Contrast: NONE  Oral Contrast: NONE      PROCEDURE:  CT of the Abdomen and Pelvis was performed.  Sagittal and coronal reformats were performed.    FINDINGS:  LOWER CHEST: No gynecomastia. Cardiomegaly with cardiac vascular   calcificationtrace pericardial effusion. Left lower lobe mucoid impacted   small airways    LIVER: Within normal limits.  BILE DUCTS: Normal caliber.  GALLBLADDER: No calcified gallstones. Gallbladder wall   edema/pericholecystic fluid is nonspecific and may be reactive in this   setting. Correlate with symptomatology. If there is concern for   cholecystitis ultrasound or HIDA scan can be obtained..  SPLEEN: Within normal limits.  PANCREAS: Within normal limits.  ADRENALS: Within normal limits.  KIDNEYS/URETERS: Marked bilateral cortical thinning with extensive renal   vascular calcification. Left renal cyst no hydronephrosis..    BLADDER: Circumferential bladder wall thickening similar to prior May   reflect under distention. Correlate with urinalysis for cystitis  REPRODUCTIVE ORGANS: Enlarged prostate    BOWEL: No bowel obstruction. Appendix no appendicitis  PERITONEUM/RETROPERITONEUM: Moderate to severe abdominal pelvic ascites   grossly unchanged. No organized fluid collections or extraluminal gas..  VESSELS: Nonaneurysmal, severely atherosclerotic.  LYMPH NODES: No lymphadenopathy.  ABDOMINAL WALL: Postoperative changes. Mild generalized anasarca.  BONES: Degenerative changes.    IMPRESSION:  Moderate to severe abdominal pelvic ascites, grossly unchanged from   11/18/2024.    Nonspecific gallbladder wall edema/pericholecystic fluid may be reactive.   Correlate with symptomatology. If there is concern for cholecystitis   right upper quadrant ultrasound and/or HIDA scan can be obtained.    Bladder wall thickening similar to prior may reflect underdistention.   Correlate with urinalysis for cholecystitis.

## 2024-11-28 NOTE — PROGRESS NOTE ADULT - ASSESSMENT
54 year old male with acute cholecystitis    - plan for laparoscopic cholecystectomy 11/29  - please provide medical/cardiology/nephrology clearance for OR  - HD per nephrology prior to OR  - preop labs  - NPO after midnight  - hold ASA/Plavix if medically safe  - consent obtained by Dr. Levine  - discussed with Dr. Levine

## 2024-11-28 NOTE — PROGRESS NOTE ADULT - PROBLEM SELECTOR PLAN 8
Hx of T2DM not on medication  Monitor FS AC QHS Hx of 3 vessel CAD s/p PCI to LAD-mid, D1, LCx (SALLIE) on 4/24 on aspirin 81mg qd and plavix 75mg qd   c/w home meds

## 2024-11-28 NOTE — PROGRESS NOTE ADULT - SUBJECTIVE AND OBJECTIVE BOX
PGY-1 Progress Note discussed with attending      PLEASE CONTACT ON CALL TEAM:  - On Call Team (Please refer to Alva) FROM 5:00 PM - 8:30PM  - Nightfloat Team FROM 8:30 -7:30 AM    INTERVAL HPI/OVERNIGHT EVENTS:     No acute overnight events. Pt evaluated at [facility]. Pt has no new complaints.    _________________________________________________  REVIEW OF SYSTEMS:  CONSTITUTIONAL: No acute distress  RESPIRATORY: No shortness of breath, wheezing, or cough  CARDIOVASCULAR: No chest pain or palpitations  GASTROINTESTINAL: No abdominal pain, nausea, vomiting, diarrhea, or constipation  GENITOURINARY: No dysuria or hermaturia  NEUROLOGICAL: No numbness, tremors, or loss of strength  SKIN: No new lesions    MEDICATIONS  (STANDING):  aspirin  chewable 81 milliGRAM(s) Oral daily  carvedilol 12.5 milliGRAM(s) Oral every 12 hours  cefTRIAXone   IVPB 1000 milliGRAM(s) IV Intermittent every 24 hours  clopidogrel Tablet 75 milliGRAM(s) Oral daily  heparin   Injectable 5000 Unit(s) SubCutaneous every 8 hours  lisinopril 40 milliGRAM(s) Oral daily  NIFEdipine XL 90 milliGRAM(s) Oral <User Schedule>  pantoprazole    Tablet 40 milliGRAM(s) Oral before breakfast  traZODone 100 milliGRAM(s) Oral at bedtime    MEDICATIONS  (PRN):  acetaminophen     Tablet .. 650 milliGRAM(s) Oral every 6 hours PRN Temp greater or equal to 38C (100.4F), Mild Pain (1 - 3)  ondansetron Injectable 4 milliGRAM(s) IV Push every 8 hours PRN Nausea and/or Vomiting      Vital Signs Last 24 Hrs  T(C): 36.7 (2024 06:07), Max: 37.1 (2024 15:33)  T(F): 98 (2024 06:07), Max: 98.7 (2024 15:33)  HR: 61 (2024 06:07) (60 - 66)  BP: 175/72 (2024 06:07) (143/70 - 188/84)  BP(mean): --  RR: 18 (2024 06:07) (18 - 20)  SpO2: 97% (2024 06:07) (95% - 99%)    Parameters below as of 2024 06:07  Patient On (Oxygen Delivery Method): room air      _________________________________________________  PHYSICAL EXAMINATION:  GENERAL: NAD  HEAD:  Atraumatic, Normocephalic  EYES:  conjunctiva and sclera clear  NECK: Supple, No JVD, Normal thyroid  CHEST/LUNG: Clear to auscultation.  HEART: Regular rate and rhythm; No murmurs, rubs, or gallops  ABDOMEN: Soft, Nontender, Bowel sounds present, no masses on palpation  NERVOUS SYSTEM:  Alert and oriented  EXTREMITIES:  No BLE edema  SKIN: warm, dry    I&O's Summary    _________________________________________________  LABS:                        11.3   12.95 )-----------( 242      ( 2024 09:00 )             32.6     11    135  |  102  |  61[H]  ----------------------------<  183[H]  4.4   |  27  |  8.72[H]    Ca    8.8      2024 09:00    TPro  7.2  /  Alb  2.8[L]  /  TBili  0.4  /  DBili  x   /  AST  27  /  ALT  17  /  AlkPhos  84      LIVER FUNCTIONS - ( 2024 09:00 )  Alb: 2.8 g/dL / Pro: 7.2 g/dL / ALK PHOS: 84 U/L / ALT: 17 U/L DA / AST: 27 U/L / GGT: x           CAPILLARY BLOOD GLUCOSE      POCT Blood Glucose.: 99 mg/dL (2024 22:41)              Urinalysis with Rflx Culture (collected 2024 10:25)      Urinalysis Basic - ( 2024 10:25 )    Color: Yellow / Appearance: Clear / S.011 / pH: x  Gluc: x / Ketone: Negative mg/dL  / Bili: Negative / Urobili: 0.2 mg/dL   Blood: x / Protein: 100 mg/dL / Nitrite: Negative   Leuk Esterase: Negative / RBC: 2 /HPF / WBC 2 /HPF   Sq Epi: x / Non Sq Epi: x / Bacteria: x      _________________________________________________  RADIOLOGY & ADDITIONAL TESTS:    Imaging Personally Reviewed:  YES    Consultant(s) Notes Reviewed:   YES    Care Discussed with Consultants : YES     Plan of care was discussed with patient and /or primary care giver; all questions and concerns were addressed and care was aligned with patient's wishes.       PGY-1 Progress Note discussed with attending      PLEASE CONTACT ON CALL TEAM:  - On Call Team (Please refer to Alva) FROM 5:00 PM - 8:30PM  - Nightfloat Team FROM 8:30 -7:30 AM    INTERVAL HPI/OVERNIGHT EVENTS:     No acute overnight events. Pt evaluated at bedside. Pt reports improvement in abdominal pain. Denies N/V or BM since admission.     _________________________________________________  REVIEW OF SYSTEMS:  CONSTITUTIONAL: No acute distress  RESPIRATORY: No shortness of breath, wheezing, or cough  CARDIOVASCULAR: No chest pain or palpitations  GASTROINTESTINAL: No abdominal pain, nausea, vomiting, diarrhea, or constipation  GENITOURINARY: No dysuria or hematuria  NEUROLOGICAL: No numbness, tremors, or loss of strength  SKIN: No new lesions    MEDICATIONS  (STANDING):  aspirin  chewable 81 milliGRAM(s) Oral daily  carvedilol 12.5 milliGRAM(s) Oral every 12 hours  cefTRIAXone   IVPB 1000 milliGRAM(s) IV Intermittent every 24 hours  clopidogrel Tablet 75 milliGRAM(s) Oral daily  heparin   Injectable 5000 Unit(s) SubCutaneous every 8 hours  lisinopril 40 milliGRAM(s) Oral daily  NIFEdipine XL 90 milliGRAM(s) Oral <User Schedule>  pantoprazole    Tablet 40 milliGRAM(s) Oral before breakfast  traZODone 100 milliGRAM(s) Oral at bedtime    MEDICATIONS  (PRN):  acetaminophen     Tablet .. 650 milliGRAM(s) Oral every 6 hours PRN Temp greater or equal to 38C (100.4F), Mild Pain (1 - 3)  ondansetron Injectable 4 milliGRAM(s) IV Push every 8 hours PRN Nausea and/or Vomiting      Vital Signs Last 24 Hrs  T(C): 36.7 (2024 06:07), Max: 37.1 (2024 15:33)  T(F): 98 (2024 06:07), Max: 98.7 (2024 15:33)  HR: 61 (2024 06:07) (60 - 66)  BP: 175/72 (2024 06:07) (143/70 - 188/84)  BP(mean): --  RR: 18 (2024 06:07) (18 - 20)  SpO2: 97% (2024 06:07) (95% - 99%)    Parameters below as of 2024 06:07  Patient On (Oxygen Delivery Method): room air      _________________________________________________  PHYSICAL EXAMINATION:  GENERAL: NAD  HEAD:  Atraumatic, Normocephalic  EYES:  conjunctiva and sclera clear  NECK: Supple, No JVD  CHEST/LUNG: Clear to auscultation.  HEART: Regular rate and rhythm; No murmurs, rubs, or gallops  ABDOMEN: Mild distention, TTP in RUQ, Bowel sounds present, no masses on palpation  NERVOUS SYSTEM:  Alert and oriented  EXTREMITIES:  1+ BLE edema  SKIN: warm, dry    I&O's Summary    _________________________________________________  LABS:                        11.3   12.95 )-----------( 242      ( 2024 09:00 )             32.6     11    135  |  102  |  61[H]  ----------------------------<  183[H]  4.4   |  27  |  8.72[H]    Ca    8.8      2024 09:00    TPro  7.2  /  Alb  2.8[L]  /  TBili  0.4  /  DBili  x   /  AST  27  /  ALT  17  /  AlkPhos  84      LIVER FUNCTIONS - ( 2024 09:00 )  Alb: 2.8 g/dL / Pro: 7.2 g/dL / ALK PHOS: 84 U/L / ALT: 17 U/L DA / AST: 27 U/L / GGT: x           CAPILLARY BLOOD GLUCOSE      POCT Blood Glucose.: 99 mg/dL (2024 22:41)              Urinalysis with Rflx Culture (collected 2024 10:25)      Urinalysis Basic - ( 2024 10:25 )    Color: Yellow / Appearance: Clear / S.011 / pH: x  Gluc: x / Ketone: Negative mg/dL  / Bili: Negative / Urobili: 0.2 mg/dL   Blood: x / Protein: 100 mg/dL / Nitrite: Negative   Leuk Esterase: Negative / RBC: 2 /HPF / WBC 2 /HPF   Sq Epi: x / Non Sq Epi: x / Bacteria: x      _________________________________________________  RADIOLOGY & ADDITIONAL TESTS:    < from: CT Abdomen and Pelvis No Cont (24 @ 09:41) >  Nonspecific gallbladder wall edema/pericholecystic fluid may be reactive.   Correlate with symptomatology. If there is concern for cholecystitis   right upper quadrant ultrasound and/or HIDA scan can be obtained.    Bladder wall thickening similar to prior may reflect underdistention.   Correlate with urinalysis for cholecystitis.    < end of copied text >  < from: US Abdomen Upper Quadrant Right (24 @ 11:51) >  Gallbladder sludge. Gallbladder wall thickening and trace pericholecystic   fluid  may be reactive. Cholecystitis not excluded. Therefore HIDA scan   can be obtained. No biliary dilatation.  Ascites..    < end of copied text >  < from: NM Hepatobiliary Imaging (24 @ 20:07) >  IMPRESSION: Abnormal hepatobiliary scan.    Findings compatible with biliary obstruction.    Underlying acute cholcystitis is not excluded.    < end of copied text >      Imaging Personally Reviewed:  YES    Consultant(s) Notes Reviewed:   YES    Care Discussed with Consultants : YES     Plan of care was discussed with patient and /or primary care giver; all questions and concerns were addressed and care was aligned with patient's wishes.

## 2024-11-28 NOTE — PROGRESS NOTE ADULT - PROBLEM SELECTOR PLAN 4
Hx of ESRD on HD via LUE AVF on M[facility]F [facility], makes minimal urine   Nephro Dr. Bailey consulted Hx of ascites w/ monthly paracentesis since 1/2021 (~10-15L/month)  Unclear etiology for recurrent ascites. No cirrhosis. Possibly cardiogenic  Last paracentesis on 11/19, fluid w/u showed SBP, SAAG <1.1, fluid protein >5  CT A/P:  Moderate to severe abdominal pelvic ascites, grossly unchanged from 11/18/2024  PEx: + distended, soft, non tender, no guarding or rebound tenderness. No clinical signs of SBP   s/p ceftriaxone 2g qd 11/20-11/25 and cefuroxime 250mg bid on 11/26, s/p ceftriaxone 2g in ED  Consider repeat paracentesis

## 2024-11-28 NOTE — PROGRESS NOTE ADULT - PROBLEM SELECTOR PLAN 2
p/w 1d history of >10 episodes of diarrhea, >5 episodes of NBNB vomiting   Supportive therapy - zofran PRN (QTc 470)  No fluids given ESRD status   C diff neg   CLD, advance diet as tolerated   f/u GI PCR Patient pending procedure, risk stratification as below:  RCRI = 3 points. 15% 30-day risk of death, MI, or cardiac arrest  Brown score = 0.2% Risk of myocardial infarction or cardiac arrest, intraoperatively or upto 30 days post-op  Overall in consideration of pt's prior medical history they have been optimized for procedure from a medical standpoint    HOLD ASA, PLAVIX, HEPARIN in preparation for procedure tomorrow Patient pending procedure, risk stratification as below:  RCRI = 3 points. 15% 30-day risk of death, MI, or cardiac arrest  Brown score = 0.2% Risk of myocardial infarction or cardiac arrest, intraoperatively or up to 30 days post-op  Overall in consideration of pt's prior medical history they have been optimized for procedure from a medical standpoint    Cardiology Dr. Quick consulted  HOLD ASA & PLAVIX in preparation for possible procedure tomorrow

## 2024-11-28 NOTE — CONSULT NOTE ADULT - SUBJECTIVE AND OBJECTIVE BOX
1045 Patient was bladder scanned for 526ml, received orders. straight cath was 525ml     1800 Patient was bladder scanned for 493ml. Orders received.  Straight cath was 600ml MR:- [id number] :  NAME:-ALAINA CANO:-    DATE OF SERVICE:11-28-24 @ 10:35    Patient was seen,examined and evaluated  by Solo Quick MD [id number] @ 10:35 .  ER evaluation, Labs and Hospital course was reviewed,    CHIEF COMPLAINT:Abdominal Pain    HPI:HPI:  Patient is a 54M with PMHx of ESRD on HD via LUE AVF (M/W/F), HFrEF (10/24 EF 45%), CAD s/p PCI, recurrent ascites (gets 10-15L removed every 5-6 weeks), HTN, DM (not on meds), HLD who presented to the hospital for intractable vomiting and diarrhea. Patient was admitted 11/18 - 11/25 for SBP. Patient reports took his cefuroxime as prescribed yesterday. He ate hot chicken wings and Pina colada shake for dinner and 2h later he started having NBNB vomiting and non-bloody watery diarrhea. He reports being up all night due to diarrhea. He reports >10 episodes of diarrhea and >5 episodes of vomiting. He reports diffuse abdominal pain but more focally in the epigastric area. He denies chest pain, palpitation, SOB, fever, chills, cough arm or leg weakness.      (27 Nov 2024 16:13)    Scheduled for [facility]  Denies chest pain dyspnea     CARDIAC HISTORY:  [x ] CAD [x [PCI [ ] CABG [ ] Prior Cath  [ ] Atrial Fibrillation  Devices[ ] PPM [ ] ICD [ ]ILR  Heart Failure [x ] HFrEF [ ] HFpEF    PAST MEDICAL & SURGICAL HISTORY:  Type 2 diabetes mellitus      Hypertension      End stage renal disease  started HD 2/2019 T, Th, Sat via right chest permacath      Bone spur  right shoulder- hx of - sx done      Anemia      Injury of right wrist  hx of at age 14      History of hyperkalemia  before HD- K-6.2 - to repeat in am of sx, pt had HD today      S/P arthroscopy of right shoulder  2010      History of vascular access device  right chest permacath - 2/2019      H/O right wrist surgery  tendons repair - at age 14      AV fistula      H/O ventral hernia repair        Home Medications:   * Patient Currently Takes Medications as of 27-Nov-2024 17:23 documented in Structured Notes  · 	cefuroxime 250 mg oral tablet: Last Dose Taken:  , 1 tab(s) orally every 12 hours  · 	ondansetron 4 mg oral tablet: Last Dose Taken:  , 1 tab(s) orally every 8 hours as needed for  nausea  · 	pantoprazole 40 mg oral delayed release tablet: Last Dose Taken:  , 1 tab(s) orally once a day (before a meal)  · 	lisinopril 40 mg oral tablet: Last Dose Taken:  , 1 tab(s) orally once a day  · 	carvedilol 12.5 mg oral tablet: Last Dose Taken:  , 1 tab(s) orally 2 times a day  · 	NIFEdipine (Eqv-Procardia XL) 90 mg oral tablet, extended release: Last Dose Taken:  , 1 tab(s) orally once a day  · 	Lasix 20 mg oral tablet: Last Dose Taken:  , 1 tab(s) orally once a day  · 	lovastatin 10 mg oral tablet: Last Dose Taken:  , 1 tab(s) orally once a day (at bedtime)  · 	traZODone 100 mg oral tablet: Last Dose Taken:  , 1 orally once a day (at bedtime)  · 	Plavix 75 mg oral tablet: 1 tab(s) orally once a day    MEDICATIONS  (STANDING):  aspirin  chewable 81 milliGRAM(s) Oral once  carvedilol 12.5 milliGRAM(s) Oral every 12 hours  cefTRIAXone   IVPB 1000 milliGRAM(s) IV Intermittent every 24 hours  heparin   Injectable 5000 Unit(s) SubCutaneous every 8 hours  lisinopril 40 milliGRAM(s) Oral daily  NIFEdipine XL 90 milliGRAM(s) Oral <User Schedule>  pantoprazole    Tablet 40 milliGRAM(s) Oral before breakfast  traZODone 100 milliGRAM(s) Oral at bedtime    MEDICATIONS  (PRN):  acetaminophen     Tablet .. 650 milliGRAM(s) Oral every 6 hours PRN Temp greater or equal to 38C (100.4F), Mild Pain (1 - 3)  ondansetron Injectable 4 milliGRAM(s) IV Push every 8 hours PRN Nausea and/or Vomiting      FAMILY HISTORY:  FH: hypertension  mother, father- alive    FH: type 2 diabetes  mother, father- alive      No family history of premature coronary artery disease or sudden cardiac death    SOCIAL HISTORY:  Smoking-[ ] Active  [ x] Former [ ] Non Smoker  Alcohol-[ x] Denies [ ] Social [ ] Daily  Ilicit Drug use-[x ] Denies [ ] Active user    REVIEW OF SYSTEMS:  Constitutional: [ ] fever, [ ]weight loss, [ x]fatigue   Activity [ ] Bedbound,[x ] Ambulates [x ] Unassisted[ ] Cane/Walker [ ] Assistence.  Effort tolerance:[ ] Excellent [ ] Good [ ] Fair [x ] Poor [ ]  Eyes: [ ] visual changes  Respiratory: [ ]shortness of breath;  [ ] cough, [ ]wheezing, [ ]chills, [ ]hemoptysis  Cardiovascular: [ ] chest pain, [ ]palpitations, [ ]dizziness,  [ ]leg swelling[ ]orthopnea [ ]PND  Gastrointestinal: [x ] abdominal pain, [x ]nausea, [x ]vomiting,  [ ]diarrhea,[ ]constipation  Genitourinary: [ ] dysuria, [ ] hematuria  Neurologic: [ ] headaches [ ] tremors[ ] weakness  Skin: [ ] itching, [ ]burning, [ ] rashes  Endocrine: [ ] heat or cold intolerance  Musculoskeletal: [ ] joint pain or swelling; [ ] muscle, back, or extremity pain  Psychiatric: [ ] depression, [ ]anxiety, [ ]mood swings, or [ ]difficulty sleeping  Hematologic: [ ] easy bruising, [ ] bleeding gums       [ x] All others negative	  [ ] Unable to obtain    Vital Signs Last 24 Hrs  T(C): 36.7 (28 Nov 2024 06:07), Max: 37.1 (27 Nov 2024 15:33)  T(F): 98 (28 Nov 2024 06:07), Max: 98.7 (27 Nov 2024 15:33)  HR: 61 (28 Nov 2024 06:07) (60 - 66)  BP: 175/72 (28 Nov 2024 06:07) (143/70 - 188/84)  RR: 18 (28 Nov 2024 06:07) (18 - 20)  SpO2: 97% (28 Nov 2024 06:07) (95% - 99%)    Parameters below as of 28 Nov 2024 06:07  Patient On (Oxygen Delivery Method): room air      I&O's Summary      PHYSICAL EXAM:  General: No acute distress BMI-32  HEENT: EOMI, PERRL[ ] Icteric  Neck: Supple, [ ] JVD  Lungs: Equal air entry bilaterally; [ ] Rales [ ] Rhonchi [ ] Wheezing  Heart: Regular rate and rhythm;[ x] Murmurs- 2  /6 [x ] Systolic [ ] Diastolic [ ] Radiation,No rubs, or gallops  Abdomen: Nontender, bowel sounds present  Extremities: No clubbing, cyanosis, [ ] edema[ ] Calf tenderness  Nervous system:  Alert & Oriented X3, no focal deficits  Psychiatric: Normal affect  Skin: No rashes or lesions      LABS:  11-27    135  |  102  |  61[H]  ----------------------------<  183[H]  4.4   |  27  |  8.72[H]    Ca    8.8      27 Nov 2024 09:00    TPro  7.2  /  Alb  2.8[L]  /  TBili  0.4  /  DBili  x   /  AST  27  /  ALT  17  /  AlkPhos  84  11-27    Creatinine Trend: 8.72<--, 8.43<--, 7.73<--, 5.96<--, 5.35<--, 5.13<--                        11.3   12.95 )-----------( 242      ( 27 Nov 2024 09:00 )             32.6      Transthoracic Echocardiogram (11.04.23 @ 16:00) >  CONCLUSIONS:  1. Trace mitral regurgitation.  2.  Trace aortic regurgitation.  3. Moderately dilated left atrium.  LA volume index = 46 cc/m2.  4. Severe left ventricular enlargement.  5. Moderate global left ventricular systolic dysfunction.EF 35-40%  6. Grade III-IV diastolic dysfunction (severe).  7. Right ventricle not well visualized.    Probably normal right ventricular size and systolic function.  8. Trace pericardial effusion.         ECG     Sinus bradycardiawith 1st degree A-V block  Left anterior fascicular block  Anterior infarct , age undetermined  Abnormal ECG      NM Hepatobiliary Imaging (11.27.24 @ 20:07) >    IMPRESSION: Abnormal hepatobiliary scan.    Findings compatible with biliary obstruction.    Underlying acute cholcystitis is not excluded.         MR:- [id number] :  NAME:ALAINA GONZALES:-    DATE OF SERVICE:11-28-24 @ 10:35    Patient was seen,examined and evaluated  by Solo Quick MD [id number] @ 10:35 .  ER evaluation, Labs and Hospital course was reviewed,    CHIEF COMPLAINT:Abdominal Pain    HPI:HPI:  Patient is a 54M with PMHx of ESRD on HD via LUE AVF (M/W/F), HFrEF (10/24 EF 45%), CAD s/p PCI, recurrent ascites (gets 10-15L removed every 5-6 weeks), HTN, DM (not on meds), HLD who presented to the hospital for intractable vomiting and diarrhea. Patient was admitted 11/18 - 11/25 for SBP. Patient reports took his cefuroxime as prescribed yesterday. He ate hot chicken wings and Pina colada shake for dinner and 2h later he started having NBNB vomiting and non-bloody watery diarrhea. He reports being up all night due to diarrhea. He reports >10 episodes of diarrhea and >5 episodes of vomiting. He reports diffuse abdominal pain but more focally in the epigastric area. He denies chest pain, palpitation, SOB, fever, chills, cough arm or leg weakness.      (27 Nov 2024 16:13)    CARDIAC HX  Presented 04/05/2024 to [facility] with Shortness of breath and hypertensive emergency. Troponin noted to be elevated peak 3.99 (scale: normal< 0.33). NSTEMI,  C 04/07/2024 with 3 vessel CAD, 70% lesion of proximal LAD, 95% lesion of 1st Diag and 50% lesion of mid LCx.  Transferred to [facility] on 04/09 and underwent PCI to LAD - Mid (Atherectomy, IVL and SALLIE), intervention of LAD branch D1 (PTCA, Atherotomy, IVL and SALLIE), intervention of LCx branch LPL1  (Atherotomy, Atherectomy and SALLIE.   Discharged home 4/11 after HD. Represented to [facility] for chest pressure 04/16/24, s/p paracentesis and abx for     peritonitis.  Scheduled for [facility]  Denies chest pain dyspnea     CARDIAC HISTORY:  [x ] CAD [x [PCI [ ] CABG [ ] Prior Cath  [ ] Atrial Fibrillation  Devices[ ] PPM [ ] ICD [ ]ILR  Heart Failure [x ] HFrEF [ ] HFpEF    PAST MEDICAL & SURGICAL HISTORY:  Type 2 diabetes mellitus      Hypertension      End stage renal disease  started HD 2/2019 T, Th, Sat via right chest permacath      Bone spur  right shoulder- hx of - sx done      Anemia      Injury of right wrist  hx of at age 14      History of hyperkalemia  before HD- K-6.2 - to repeat in am of sx, pt had HD today      S/P arthroscopy of right shoulder  2010      History of vascular access device  right chest permacath - 2/2019      H/O right wrist surgery  tendons repair - at age 14      AV fistula      H/O ventral hernia repair        Home Medications:   * Patient Currently Takes Medications as of 27-Nov-2024 17:23 documented in Structured Notes  · 	cefuroxime 250 mg oral tablet: Last Dose Taken:  , 1 tab(s) orally every 12 hours  · 	ondansetron 4 mg oral tablet: Last Dose Taken:  , 1 tab(s) orally every 8 hours as needed for  nausea  · 	pantoprazole 40 mg oral delayed release tablet: Last Dose Taken:  , 1 tab(s) orally once a day (before a meal)  · 	lisinopril 40 mg oral tablet: Last Dose Taken:  , 1 tab(s) orally once a day  · 	carvedilol 12.5 mg oral tablet: Last Dose Taken:  , 1 tab(s) orally 2 times a day  · 	NIFEdipine (Eqv-Procardia XL) 90 mg oral tablet, extended release: Last Dose Taken:  , 1 tab(s) orally once a day  · 	Lasix 20 mg oral tablet: Last Dose Taken:  , 1 tab(s) orally once a day  · 	lovastatin 10 mg oral tablet: Last Dose Taken:  , 1 tab(s) orally once a day (at bedtime)  · 	traZODone 100 mg oral tablet: Last Dose Taken:  , 1 orally once a day (at bedtime)  · 	Plavix 75 mg oral tablet: 1 tab(s) orally once a day    MEDICATIONS  (STANDING):  aspirin  chewable 81 milliGRAM(s) Oral once  carvedilol 12.5 milliGRAM(s) Oral every 12 hours  cefTRIAXone   IVPB 1000 milliGRAM(s) IV Intermittent every 24 hours  heparin   Injectable 5000 Unit(s) SubCutaneous every 8 hours  lisinopril 40 milliGRAM(s) Oral daily  NIFEdipine XL 90 milliGRAM(s) Oral <User Schedule>  pantoprazole    Tablet 40 milliGRAM(s) Oral before breakfast  traZODone 100 milliGRAM(s) Oral at bedtime    MEDICATIONS  (PRN):  acetaminophen     Tablet .. 650 milliGRAM(s) Oral every 6 hours PRN Temp greater or equal to 38C (100.4F), Mild Pain (1 - 3)  ondansetron Injectable 4 milliGRAM(s) IV Push every 8 hours PRN Nausea and/or Vomiting      FAMILY HISTORY:  FH: hypertension  mother, father- alive    FH: type 2 diabetes  mother, father- alive      No family history of premature coronary artery disease or sudden cardiac death    SOCIAL HISTORY:  Smoking-[ ] Active  [ x] Former [ ] Non Smoker  Alcohol-[ x] Denies [ ] Social [ ] Daily  Ilicit Drug use-[x ] Denies [ ] Active user    REVIEW OF SYSTEMS:  Constitutional: [ ] fever, [ ]weight loss, [ x]fatigue   Activity [ ] Bedbound,[x ] Ambulates [x ] Unassisted[ ] Cane/Walker [ ] Assistence.  Effort tolerance:[ ] Excellent [ ] Good [ ] Fair [x ] Poor [ ]  Eyes: [ ] visual changes  Respiratory: [ ]shortness of breath;  [ ] cough, [ ]wheezing, [ ]chills, [ ]hemoptysis  Cardiovascular: [ ] chest pain, [ ]palpitations, [ ]dizziness,  [ ]leg swelling[ ]orthopnea [ ]PND  Gastrointestinal: [x ] abdominal pain, [x ]nausea, [x ]vomiting,  [ ]diarrhea,[ ]constipation  Genitourinary: [ ] dysuria, [ ] hematuria  Neurologic: [ ] headaches [ ] tremors[ ] weakness  Skin: [ ] itching, [ ]burning, [ ] rashes  Endocrine: [ ] heat or cold intolerance  Musculoskeletal: [ ] joint pain or swelling; [ ] muscle, back, or extremity pain  Psychiatric: [ ] depression, [ ]anxiety, [ ]mood swings, or [ ]difficulty sleeping  Hematologic: [ ] easy bruising, [ ] bleeding gums       [ x] All others negative	  [ ] Unable to obtain    Vital Signs Last 24 Hrs  T(C): 36.7 (28 Nov 2024 06:07), Max: 37.1 (27 Nov 2024 15:33)  T(F): 98 (28 Nov 2024 06:07), Max: 98.7 (27 Nov 2024 15:33)  HR: 61 (28 Nov 2024 06:07) (60 - 66)  BP: 175/72 (28 Nov 2024 06:07) (143/70 - 188/84)  RR: 18 (28 Nov 2024 06:07) (18 - 20)  SpO2: 97% (28 Nov 2024 06:07) (95% - 99%)    Parameters below as of 28 Nov 2024 06:07  Patient On (Oxygen Delivery Method): room air      I&O's Summary      PHYSICAL EXAM:  General: No acute distress BMI-32  HEENT: EOMI, PERRL[ ] Icteric  Neck: Supple, [ ] JVD  Lungs: Equal air entry bilaterally; [ ] Rales [ ] Rhonchi [ ] Wheezing  Heart: Regular rate and rhythm;[ x] Murmurs- 2  /6 [x ] Systolic [ ] Diastolic [ ] Radiation,No rubs, or gallops  Abdomen: Nontender, bowel sounds present  Extremities: No clubbing, cyanosis, [ ] edema[ ] Calf tenderness  Nervous system:  Alert & Oriented X3, no focal deficits  Psychiatric: Normal affect  Skin: No rashes or lesions      LABS:  11-27    135  |  102  |  61[H]  ----------------------------<  183[H]  4.4   |  27  |  8.72[H]    Ca    8.8      27 Nov 2024 09:00    TPro  7.2  /  Alb  2.8[L]  /  TBili  0.4  /  DBili  x   /  AST  27  /  ALT  17  /  AlkPhos  84  11-27    Creatinine Trend: 8.72<--, 8.43<--, 7.73<--, 5.96<--, 5.35<--, 5.13<--                        11.3   12.95 )-----------( 242      ( 27 Nov 2024 09:00 )             32.6      Transthoracic Echocardiogram (11.04.23 @ 16:00) >  CONCLUSIONS:  1. Trace mitral regurgitation.  2.  Trace aortic regurgitation.  3. Moderately dilated left atrium.  LA volume index = 46 cc/m2.  4. Severe left ventricular enlargement.  5. Moderate global left ventricular systolic dysfunction.EF 35-40%  6. Grade III-IV diastolic dysfunction (severe).  7. Right ventricle not well visualized.    Probably normal right ventricular size and systolic function.  8. Trace pericardial effusion.         ECG     Sinus bradycardiawith 1st degree A-V block  Left anterior fascicular block  Anterior infarct , age undetermined  Abnormal ECG      NM Hepatobiliary Imaging (11.27.24 @ 20:07) >    IMPRESSION: Abnormal hepatobiliary scan.    Findings compatible with biliary obstruction.    Underlying acute cholcystitis is not excluded.

## 2024-11-28 NOTE — PROGRESS NOTE ADULT - SUBJECTIVE AND OBJECTIVE BOX
ALAINA CANO  54y  Patient is a 54y old  Male who presents with a chief complaint of Vomiting and diarrhea (28 Nov 2024 13:10)    HPI:  Seen and examined. ESRD on HD.    HEALTH ISSUES - PROBLEM Dx:  ESRD on hemodialysis    Ascites    Acute diarrhea    HTN (hypertension)    HFrEF (heart failure with reduced ejection fraction)    CAD S/P percutaneous coronary angioplasty    Prophylactic measure    T2DM (type 2 diabetes mellitus)    Thickening of wall of gallbladder with pericholecystic fluid    Ventral hernia    Preoperative clearance          MEDICATIONS  (STANDING):  carvedilol 12.5 milliGRAM(s) Oral every 12 hours  cefTRIAXone   IVPB 1000 milliGRAM(s) IV Intermittent every 24 hours  heparin   Injectable 5000 Unit(s) SubCutaneous every 8 hours  lisinopril 40 milliGRAM(s) Oral daily  NIFEdipine XL 90 milliGRAM(s) Oral <User Schedule>  pantoprazole    Tablet 40 milliGRAM(s) Oral before breakfast  traZODone 100 milliGRAM(s) Oral at bedtime    MEDICATIONS  (PRN):  acetaminophen     Tablet .. 650 milliGRAM(s) Oral every 6 hours PRN Temp greater or equal to 38C (100.4F), Mild Pain (1 - 3)  ondansetron Injectable 4 milliGRAM(s) IV Push every 8 hours PRN Nausea and/or Vomiting    Vital Signs Last 24 Hrs  T(C): 36.2 (28 Nov 2024 13:20), Max: 37.1 (27 Nov 2024 15:33)  T(F): 97.1 (28 Nov 2024 13:20), Max: 98.7 (27 Nov 2024 15:33)  HR: 57 (28 Nov 2024 13:20) (57 - 64)  BP: 123/63 (28 Nov 2024 13:20) (108/61 - 188/84)  BP(mean): --  RR: 18 (28 Nov 2024 13:20) (18 - 20)  SpO2: 94% (28 Nov 2024 13:20) (94% - 99%)    Parameters below as of 28 Nov 2024 13:20  Patient On (Oxygen Delivery Method): room air      Daily     Daily     PHYSICAL EXAM:  Constitutional:   appears comfortable and not distressed. Not diaphoretic.    Neck:  The thyroid is normal. Trachea is midline.     Breasts: Normal examination.    Respiratory: The lungs are clear to auscultation. No dullness and expansion is normal.    Cardiovascular: S1 and S2 are normal. No mummurs, rubs or gallops are present.    Gastrointestinal: The abdomen is soft. RUQ tenderness is present.     Genitourinary: The bladder is not distended. No CVA tenderness is present.    Extremities: No edema is noted. No deformities are present.    Neurological: Cognition is normal. Tone, power and sensation are normal. Gait is steady.    Skin: No leasions are seen  or palpated.    Lymph Nodes: No lymphadenopathy is present.    Psychiatric: Mood is appropriate. No hallucinations or flight of ideas are noted.                              10.5   9.38  )-----------( 247      ( 28 Nov 2024 13:15 )             30.7     11-28    134[L]  |  102  |  71[H]  ----------------------------<  144[H]  4.5   |  25  |  10.00[H]    Ca    8.7      28 Nov 2024 13:15  Phos  4.4     11-28  Mg     2.1     11-28    TPro  6.8  /  Alb  2.5[L]  /  TBili  2.3[H]  /  DBili  x   /  AST  343[H]  /  ALT  217[H]  /  AlkPhos  280[H]  11-28    Urinalysis Basic - ( 28 Nov 2024 13:15 )    Color: x / Appearance: x / SG: x / pH: x  Gluc: 144 mg/dL / Ketone: x  / Bili: x / Urobili: x   Blood: x / Protein: x / Nitrite: x   Leuk Esterase: x / RBC: x / WBC x   Sq Epi: x / Non Sq Epi: x / Bacteria: x

## 2024-11-29 ENCOUNTER — TRANSCRIPTION ENCOUNTER (OUTPATIENT)
Age: 54
End: 2024-11-29

## 2024-11-29 LAB
ALBUMIN SERPL ELPH-MCNC: 2.5 G/DL — LOW (ref 3.5–5)
ALP SERPL-CCNC: 287 U/L — HIGH (ref 40–120)
ALT FLD-CCNC: 214 U/L DA — HIGH (ref 10–60)
ANION GAP SERPL CALC-SCNC: 6 MMOL/L — SIGNIFICANT CHANGE UP (ref 5–17)
APTT BLD: 31.6 SEC — SIGNIFICANT CHANGE UP (ref 24.5–35.6)
AST SERPL-CCNC: 256 U/L — HIGH (ref 10–40)
BILIRUB SERPL-MCNC: 1.7 MG/DL — HIGH (ref 0.2–1.2)
BLD GP AB SCN SERPL QL: SIGNIFICANT CHANGE UP
BUN SERPL-MCNC: 53 MG/DL — HIGH (ref 7–18)
CALCIUM SERPL-MCNC: 8.3 MG/DL — LOW (ref 8.4–10.5)
CHLORIDE SERPL-SCNC: 107 MMOL/L — SIGNIFICANT CHANGE UP (ref 96–108)
CO2 SERPL-SCNC: 28 MMOL/L — SIGNIFICANT CHANGE UP (ref 22–31)
CREAT SERPL-MCNC: 8.37 MG/DL — HIGH (ref 0.5–1.3)
EGFR: 7 ML/MIN/1.73M2 — LOW
GLUCOSE BLDC GLUCOMTR-MCNC: 164 MG/DL — HIGH (ref 70–99)
GLUCOSE SERPL-MCNC: 140 MG/DL — HIGH (ref 70–99)
HCT VFR BLD CALC: 28.4 % — LOW (ref 39–50)
HGB BLD-MCNC: 9.9 G/DL — LOW (ref 13–17)
INR BLD: 1.15 RATIO — SIGNIFICANT CHANGE UP (ref 0.85–1.16)
MAGNESIUM SERPL-MCNC: 2.1 MG/DL — SIGNIFICANT CHANGE UP (ref 1.6–2.6)
MCHC RBC-ENTMCNC: 32.6 PG — SIGNIFICANT CHANGE UP (ref 27–34)
MCHC RBC-ENTMCNC: 34.9 G/DL — SIGNIFICANT CHANGE UP (ref 32–36)
MCV RBC AUTO: 93.4 FL — SIGNIFICANT CHANGE UP (ref 80–100)
NRBC # BLD: 0 /100 WBCS — SIGNIFICANT CHANGE UP (ref 0–0)
PHOSPHATE SERPL-MCNC: 3.9 MG/DL — SIGNIFICANT CHANGE UP (ref 2.5–4.5)
PLATELET # BLD AUTO: 233 K/UL — SIGNIFICANT CHANGE UP (ref 150–400)
POTASSIUM SERPL-MCNC: 4.4 MMOL/L — SIGNIFICANT CHANGE UP (ref 3.5–5.3)
POTASSIUM SERPL-SCNC: 4.4 MMOL/L — SIGNIFICANT CHANGE UP (ref 3.5–5.3)
PROT SERPL-MCNC: 6.2 G/DL — SIGNIFICANT CHANGE UP (ref 6–8.3)
PROTHROM AB SERPL-ACNC: 13.4 SEC — SIGNIFICANT CHANGE UP (ref 9.9–13.4)
RBC # BLD: 3.04 M/UL — LOW (ref 4.2–5.8)
RBC # FLD: 15 % — HIGH (ref 10.3–14.5)
SODIUM SERPL-SCNC: 141 MMOL/L — SIGNIFICANT CHANGE UP (ref 135–145)
WBC # BLD: 8.85 K/UL — SIGNIFICANT CHANGE UP (ref 3.8–10.5)
WBC # FLD AUTO: 8.85 K/UL — SIGNIFICANT CHANGE UP (ref 3.8–10.5)

## 2024-11-29 PROCEDURE — 47563 LAPARO CHOLECYSTECTOMY/GRAPH: CPT | Mod: AS

## 2024-11-29 PROCEDURE — 99233 SBSQ HOSP IP/OBS HIGH 50: CPT | Mod: GC

## 2024-11-29 PROCEDURE — 88304 TISSUE EXAM BY PATHOLOGIST: CPT | Mod: 26

## 2024-11-29 PROCEDURE — 47563 LAPARO CHOLECYSTECTOMY/GRAPH: CPT

## 2024-11-29 DEVICE — LIGATING CLIPS WECK HEMOLOK POLYMER LARGE (PURPLE) 6: Type: IMPLANTABLE DEVICE | Status: FUNCTIONAL

## 2024-11-29 DEVICE — IMPLANTABLE DEVICE: Type: IMPLANTABLE DEVICE | Status: FUNCTIONAL

## 2024-11-29 RX ORDER — HYDROMORPHONE HYDROCHLORIDE 2 MG/1
0.5 TABLET ORAL
Refills: 0 | Status: DISCONTINUED | OUTPATIENT
Start: 2024-11-29 | End: 2024-11-29

## 2024-11-29 RX ORDER — SODIUM CHLORIDE 9 MG/ML
1000 INJECTION, SOLUTION INTRAMUSCULAR; INTRAVENOUS; SUBCUTANEOUS
Refills: 0 | Status: DISCONTINUED | OUTPATIENT
Start: 2024-11-29 | End: 2024-11-29

## 2024-11-29 RX ORDER — TETRACAINE HYDROCHLORIDE 5 MG/ML
1 SOLUTION OPHTHALMIC
Refills: 0 | Status: COMPLETED | OUTPATIENT
Start: 2024-11-29 | End: 2024-11-29

## 2024-11-29 RX ORDER — OXYCODONE HYDROCHLORIDE 30 MG/1
5 TABLET ORAL ONCE
Refills: 0 | Status: DISCONTINUED | OUTPATIENT
Start: 2024-11-29 | End: 2024-11-29

## 2024-11-29 RX ORDER — CHLORHEXIDINE GLUCONATE 1.2 MG/ML
1 RINSE ORAL DAILY
Refills: 0 | Status: DISCONTINUED | OUTPATIENT
Start: 2024-11-29 | End: 2024-12-02

## 2024-11-29 RX ORDER — ONDANSETRON HYDROCHLORIDE 4 MG/1
4 TABLET, FILM COATED ORAL ONCE
Refills: 0 | Status: DISCONTINUED | OUTPATIENT
Start: 2024-11-29 | End: 2024-11-29

## 2024-11-29 RX ORDER — ENOXAPARIN SODIUM 30 MG/.3ML
30 INJECTION SUBCUTANEOUS EVERY 24 HOURS
Refills: 0 | Status: DISCONTINUED | OUTPATIENT
Start: 2024-11-30 | End: 2024-12-02

## 2024-11-29 RX ORDER — ERYTHROMYCIN BASE 5 MG/GRAM
1 OINTMENT (GRAM) OPHTHALMIC (EYE)
Refills: 0 | Status: DISCONTINUED | OUTPATIENT
Start: 2024-11-29 | End: 2024-12-02

## 2024-11-29 RX ADMIN — TETRACAINE HYDROCHLORIDE 1 DROP(S): 5 SOLUTION OPHTHALMIC at 16:30

## 2024-11-29 RX ADMIN — CARVEDILOL 12.5 MILLIGRAM(S): 25 TABLET, FILM COATED ORAL at 18:19

## 2024-11-29 RX ADMIN — TETRACAINE HYDROCHLORIDE 1 DROP(S): 5 SOLUTION OPHTHALMIC at 17:30

## 2024-11-29 RX ADMIN — Medication 1 APPLICATION(S): at 17:15

## 2024-11-29 RX ADMIN — PANTOPRAZOLE SODIUM 40 MILLIGRAM(S): 40 TABLET, DELAYED RELEASE ORAL at 06:16

## 2024-11-29 RX ADMIN — Medication 100 MILLIGRAM(S): at 17:00

## 2024-11-29 RX ADMIN — TETRACAINE HYDROCHLORIDE 1 DROP(S): 5 SOLUTION OPHTHALMIC at 18:30

## 2024-11-29 RX ADMIN — HYDROMORPHONE HYDROCHLORIDE 0.5 MILLIGRAM(S): 2 TABLET ORAL at 15:15

## 2024-11-29 RX ADMIN — HYDROMORPHONE HYDROCHLORIDE 0.5 MILLIGRAM(S): 2 TABLET ORAL at 14:55

## 2024-11-29 RX ADMIN — TRAZODONE HYDROCHLORIDE 100 MILLIGRAM(S): 150 TABLET ORAL at 22:43

## 2024-11-29 RX ADMIN — Medication 50 MILLILITER(S): at 16:17

## 2024-11-29 RX ADMIN — HYDROMORPHONE HYDROCHLORIDE 0.5 MILLIGRAM(S): 2 TABLET ORAL at 16:16

## 2024-11-29 RX ADMIN — CARVEDILOL 12.5 MILLIGRAM(S): 25 TABLET, FILM COATED ORAL at 06:16

## 2024-11-29 RX ADMIN — HYDROMORPHONE HYDROCHLORIDE 0.5 MILLIGRAM(S): 2 TABLET ORAL at 16:49

## 2024-11-29 RX ADMIN — LISINOPRIL 40 MILLIGRAM(S): 20 TABLET ORAL at 06:16

## 2024-11-29 NOTE — DISCHARGE NOTE PROVIDER - NSDCMRMEDTOKEN_GEN_ALL_CORE_FT
carvedilol 12.5 mg oral tablet: 1 tab(s) orally 2 times a day  cefuroxime 250 mg oral tablet: 1 tab(s) orally every 12 hours  Lasix 20 mg oral tablet: 1 tab(s) orally once a day  lisinopril 40 mg oral tablet: 1 tab(s) orally once a day  lovastatin 10 mg oral tablet: 1 tab(s) orally once a day (at bedtime)  NIFEdipine (Eqv-Procardia XL) 90 mg oral tablet, extended release: 1 tab(s) orally once a day on non-HD days  ondansetron 4 mg oral tablet: 1 tab(s) orally every 8 hours as needed for  nausea  pantoprazole 40 mg oral delayed release tablet: 1 tab(s) orally once a day (before a meal)  Plavix 75 mg oral tablet: 1 tab(s) orally once a day  traZODone 100 mg oral tablet: 1 orally once a day (at bedtime)   bisacodyl 5 mg oral delayed release tablet: 1 tab(s) orally once a day (at bedtime) as needed for  constipation  carvedilol 12.5 mg oral tablet: 1 tab(s) orally 2 times a day  Lasix 20 mg oral tablet: 1 tab(s) orally once a day  lisinopril 40 mg oral tablet: 1 tab(s) orally once a day  NIFEdipine (Eqv-Procardia XL) 90 mg oral tablet, extended release: 1 tab(s) orally once a day on non-HD days  ondansetron 4 mg oral tablet: 1 tab(s) orally every 8 hours as needed for  nausea  oxyCODONE 5 mg oral tablet: 1 tab(s) orally every 8 hours MDD: 3 tabs  pantoprazole 40 mg oral delayed release tablet: 1 tab(s) orally once a day (before a meal)  traZODone 100 mg oral tablet: 1 orally once a day (at bedtime)   aspirin 81 mg oral delayed release tablet: 1 tab(s) orally  bisacodyl 5 mg oral delayed release tablet: 1 tab(s) orally once a day (at bedtime) as needed for  constipation  carvedilol 12.5 mg oral tablet: 1 tab(s) orally 2 times a day  furosemide 20 mg oral tablet: 1 tab(s) orally once a day  hydrALAZINE 25 mg oral tablet: 1 tab(s) orally 3 times a day  lisinopril 40 mg oral tablet: 1 tab(s) orally once a day  lovastatin 10 mg oral tablet: 1 tab(s) orally once a day  NIFEdipine (Eqv-Procardia XL) 90 mg oral tablet, extended release: 1 tab(s) orally once a day on non-HD days  ondansetron 4 mg oral tablet: 1 tab(s) orally every 8 hours as needed for  nausea  oxyCODONE 5 mg oral tablet: 1 tab(s) orally every 8 hours MDD: 3 tabs  pantoprazole 40 mg oral delayed release tablet: 1 tab(s) orally once a day (before a meal)  traZODone 100 mg oral tablet: 1 orally once a day (at bedtime)

## 2024-11-29 NOTE — DISCHARGE NOTE PROVIDER - HOSPITAL COURSE
Patient is a 54M with PMHx of ESRD on HD via LUE AVF (M/W/F), HFrEF (10/24 EF 45%), CAD s/p PCI, recurrent ascites (gets 10-15L removed every 5-6 weeks), HTN, DM (not on meds), HLD who presented to the hospital for intractable vomiting and diarrhea. Patient was recently admitted on 11/18 - 11/25 for SBP and took his prescribed cefuroxime.     In ED: T 97.9F, HR 66, /70, O2 99% in RA.    CT A/P showed nonspecific gallbladder wall edema/pericholecystic fluid may be reactive.    Admitted for abdominal pain and intractable diarrhea. HIDA scan positive for biliary obstruction and acute cholecystitis. Patient received 2g CTX in ED and continued on 1g CTX antibiotic treatment for cholecystitis. GI and Surgery were consulted and performed lap darryl 11/29. Patient seen by nephrology and received HD session on 11/28 in preparation for surgery. Patient also with history HFrEF on carvedilol 12.5mg bid, lisinopril 40mg qd. TTE from 10/24 showed LVEF 46%, GIIIDD, Moderate TR, Moderate pulmonary HTN, small pericardial effusion. Hx of 3 vessel CAD s/p PCI to LAD-mid, D1, LCx (SALLIE) on 4/24 on aspirin 81mg qd and plavix 75mg qd, which was held for procedure. Cardiology was consulted and patient was cleared for surgery.     Patient was placed on CLD and held NPO after MN. Cdiff was negative. Patient had no further episodes of diarrhea or vomiting. Patient also with history of ascites w/ monthly paracentesis since 1/2021 (~10-15L/month). Last paracentesis on 11/19, fluid w/u showed SBP, SAAG <1.1, fluid protein >5. CT A/P:  Moderate to severe abdominal pelvic ascites, grossly unchanged from 11/18/202    The rest of patient's chronic conditions managed with home regimen.   Patient is a 54M with PMHx of ESRD on HD via LUE AVF (M/W/F), HFrEF (10/24 EF 45%), CAD s/p PCI, recurrent ascites (gets 10-15L removed every 5-6 weeks), HTN, DM (not on meds), HLD who presented to the hospital for intractable vomiting and diarrhea. Patient was recently admitted on 11/18 - 11/25 for SBP and took his prescribed cefuroxime.  CT A/P showed nonspecific gallbladder wall edema/pericholecystic fluid may be reactive. Moderate to severe abdominal pelvic ascites, grossly unchanged from 11/18/202 Admitted for abdominal pain and intractable diarrhea. HIDA scan positive for biliary obstruction and acute cholecystitis. Patient received 2g CTX in ED and continued on 1g CTX antibiotic treatment for cholecystitis. GI and Surgery were consulted and performed lap darryl 11/29. Patient seen by nephrology and received HD session on 11/28 in preparation for surgery. Patient also with history HFrEF on carvedilol 12.5mg bid, lisinopril 40mg qd. TTE from 10/24 showed LVEF 46%, GIIIDD, Moderate TR, Moderate pulmonary HTN, small pericardial effusion. Hx of 3 vessel CAD s/p PCI to LAD-mid, D1, LCx (SALLIE) on 4/24 on aspirin 81mg qd and plavix 75mg qd, which was held for procedure. Cardiology was consulted and patient was cleared for surgery. Patient was placed on CLD and held NPO after MN. Cdiff was negative. Gallbladder was removed and a drain was placed in the abdomen due to ascites. Albumin was given.  Patient symptoms resolved and was discharged with xxx follow up.       Pt is stable for discharge. Pt has been advised to follow up as outpatient. Case has been discussed with the attending. This is just a summary of the case. For further information please refer to pt. chart document.     Patient is a 54M with PMHx of ESRD on HD via LUE AVF (M/W/F), HFrEF (10/24 EF 45%), CAD s/p PCI, recurrent ascites (gets 10-15L removed every 5-6 weeks), HTN, DM (not on meds), HLD who presented to the hospital for intractable vomiting and diarrhea. Patient was recently admitted on 11/18 - 11/25 for SBP and took his prescribed cefuroxime.  CT A/P showed nonspecific gallbladder wall edema/pericholecystic fluid may be reactive. Moderate to severe abdominal pelvic ascites, grossly unchanged from 11/18/202 Admitted for abdominal pain and intractable diarrhea. HIDA scan positive for biliary obstruction and acute cholecystitis. Patient received 2g CTX in ED and continued on 1g CTX antibiotic treatment for cholecystitis. GI and Surgery were consulted and performed lap darryl 11/29. Patient seen by nephrology and received HD session on 11/28 in preparation for surgery. Patient also with history HFrEF on carvedilol 12.5mg bid, lisinopril 40mg qd. TTE from 10/24 showed LVEF 46%, GIIIDD, Moderate TR, Moderate pulmonary HTN, small pericardial effusion. Hx of 3 vessel CAD s/p PCI to LAD-mid, D1, LCx (SALLIE) on 4/24 on aspirin 81mg qd and plavix 75mg qd, which was held for procedure. Cardiology was consulted and patient was cleared for surgery. Patient was placed on CLD and held NPO after MN. Cdiff was negative. Gallbladder was removed and a drain was placed in the abdomen due to ascites. Albumin was given. Patient with PITA drain and follow up with Surgery on drain removal.  Patient symptoms resolved and was discharged with surgical follow up.       Pt is stable for discharge. Pt has been advised to follow up as outpatient. Case has been discussed with the attending. This is just a summary of the case. For further information please refer to pt. chart document.

## 2024-11-29 NOTE — BRIEF OPERATIVE NOTE - NSICDXBRIEFPROCEDURE_GEN_ALL_CORE_FT
PROCEDURES:  Cholecystectomy, robot-assisted, with cholangiogram 29-Nov-2024 14:08:07  Micheal Joy

## 2024-11-29 NOTE — PROGRESS NOTE ADULT - PROBLEM SELECTOR PLAN 6
Hx of HFrEF on carvedilol 12.5mg bid, lisinopril 40mg qd  TTE (10/24): LVEF 46%, GIIIDD, Moderate TR, Moderate pulmonary HTN, small pericardial effusion  mild pitting edema in B/L LE  Cardio Dr. Quick following  c/w home meds

## 2024-11-29 NOTE — DISCHARGE NOTE PROVIDER - NSDCFUADDAPPT_GEN_ALL_CORE_FT
APPTS ARE READY TO BE MADE: [x] YES    Best Family or Patient Contact (if needed):    Additional Information about above appointments (if needed):    1: Surgery  2: PCP   3: Nephrology    Other comments or requests:   APPTS ARE READY TO BE MADE: [x] YES    Best Family or Patient Contact (if needed):    Additional Information about above appointments (if needed):    1: Surgery  2: PCP   3: Nephrology    Other comments or requests:    Patient was outreached three times but could not connect, however there is an appointment reflected for the patient on [facility]. Patient is scheduled to see Dr. Levine on 12/19 at [address] [place] [address]

## 2024-11-29 NOTE — PROGRESS NOTE ADULT - PROBLEM SELECTOR PLAN 5
Hx of ESRD on HD via LUE AVF on M/W/F @[facility], makes minimal urine   Nephro Dr. Bailey consulted  Unable to perform HD on admission as patient was held in HIDA scan  B/L LE edema +1, Lungs CTA, mild ascites  s/p HD 11/28  will receive another session tomorrow 11/30

## 2024-11-29 NOTE — PROGRESS NOTE ADULT - SUBJECTIVE AND OBJECTIVE BOX
Chart reviewed. 24w3d G1.   Per PL, low lying placenta. Pt moving to [facility] 8/15, needs f/u US prior to move.   Call to pt. Pt reports she is interested in going to [place] on Saturday; her  will be driving them. They would be going on a boat cruise there.     Advised pt our office does not have any restrictions; however pt at greater risk d/t low lying placenta.Something to consider when traveling during pregnancy, if any complications should arise, you would need to be comfortable seeking care at that destination and be aware of the small chance of going into labor and possibly delivering at that destination. We recommend obtaining a copy of your prenatal record and taking it with you on your travels.     We recommend stopping frequently to stretch your legs. Also, stay hydrated and empty your bladder often.     Pt understands and will consider the trip.      PGY-1 Progress Note discussed with attending      PLEASE CONTACT ON CALL TEAM:  - On Call Team (Please refer to Alva) FROM 5:00 PM - 8:30PM  - Nightfloat Team FROM 8:30 -7:30 AM    INTERVAL HPI/OVERNIGHT EVENTS:     No acute overnight events. Pt evaluated at bedside. Reports pain is improved, only tender with palpation. Pt also reports 2 episodes of semi-formed stool this AM.  _________________________________________________  REVIEW OF SYSTEMS:  CONSTITUTIONAL: No acute distress  RESPIRATORY: No shortness of breath, wheezing, or cough  CARDIOVASCULAR: No chest pain or palpitations  GASTROINTESTINAL: No nausea, vomiting, or constipation  GENITOURINARY: No dysuria or hematuria  NEUROLOGICAL: No numbness, tremors, or loss of strength  SKIN: No new lesions    MEDICATIONS  (STANDING):  carvedilol 12.5 milliGRAM(s) Oral every 12 hours  cefTRIAXone   IVPB 1000 milliGRAM(s) IV Intermittent every 24 hours  chlorhexidine 2% Cloths 1 Application(s) Topical daily  heparin   Injectable 5000 Unit(s) SubCutaneous every 8 hours  lisinopril 40 milliGRAM(s) Oral daily  NIFEdipine XL 90 milliGRAM(s) Oral <User Schedule>  pantoprazole    Tablet 40 milliGRAM(s) Oral before breakfast  traZODone 100 milliGRAM(s) Oral at bedtime    MEDICATIONS  (PRN):  acetaminophen     Tablet .. 650 milliGRAM(s) Oral every 6 hours PRN Temp greater or equal to 38C (100.4F), Mild Pain (1 - 3)  ondansetron Injectable 4 milliGRAM(s) IV Push every 8 hours PRN Nausea and/or Vomiting      Vital Signs Last 24 Hrs  T(C): 36.7 (29 Nov 2024 08:53), Max: 36.7 (29 Nov 2024 06:11)  T(F): 98.1 (29 Nov 2024 08:53), Max: 98.1 (29 Nov 2024 06:11)  HR: 62 (29 Nov 2024 08:53) (57 - 66)  BP: 148/66 (29 Nov 2024 08:53) (123/63 - 148/66)  BP(mean): --  RR: 19 (29 Nov 2024 08:53) (18 - 20)  SpO2: 92% (29 Nov 2024 08:53) (92% - 96%)    Parameters below as of 29 Nov 2024 06:11  Patient On (Oxygen Delivery Method): room air      _________________________________________________  PHYSICAL EXAMINATION:  GENERAL: NAD  HEAD:  Atraumatic, Normocephalic  EYES:  conjunctiva and sclera clear  NECK: Supple, No JVD  CHEST/LUNG: Clear to auscultation;  HEART: Regular rate and rhythm; No murmurs, rubs, or gallops  ABDOMEN: Mild Distention, Soft, Nontender, Bowel sounds present, no masses on palpation  NERVOUS SYSTEM:  Alert and orientedx3  EXTREMITIES:  No BLE edema +1  SKIN: warm, dry    I&O's Summary    28 Nov 2024 07:01  -  29 Nov 2024 07:00  --------------------------------------------------------  IN: 500 mL / OUT: 2000 mL / NET: -1500 mL      _________________________________________________  LABS:                        9.9    8.85  )-----------( 233      ( 29 Nov 2024 06:05 )             28.4     11-29    141  |  107  |  53[H]  ----------------------------<  140[H]  4.4   |  28  |  8.37[H]    Ca    8.3[L]      29 Nov 2024 06:05  Phos  3.9     11-29  Mg     2.1     11-29    TPro  6.2  /  Alb  2.5[L]  /  TBili  1.7[H]  /  DBili  x   /  AST  256[H]  /  ALT  214[H]  /  AlkPhos  287[H]  11-29    LIVER FUNCTIONS - ( 29 Nov 2024 06:05 )  Alb: 2.5 g/dL / Pro: 6.2 g/dL / ALK PHOS: 287 U/L / ALT: 214 U/L DA / AST: 256 U/L / GGT: x           CAPILLARY BLOOD GLUCOSE            PT/INR - ( 29 Nov 2024 06:05 )   PT: 13.4 sec;   INR: 1.15 ratio         PTT - ( 29 Nov 2024 06:05 )  PTT:31.6 sec      Urinalysis with Rflx Culture (collected 27 Nov 2024 10:25)      Urinalysis Basic - ( 29 Nov 2024 06:05 )    Color: x / Appearance: x / SG: x / pH: x  Gluc: 140 mg/dL / Ketone: x  / Bili: x / Urobili: x   Blood: x / Protein: x / Nitrite: x   Leuk Esterase: x / RBC: x / WBC x   Sq Epi: x / Non Sq Epi: x / Bacteria: x      _________________________________________________  RADIOLOGY & ADDITIONAL TESTS:    Imaging Personally Reviewed:  YES    Consultant(s) Notes Reviewed:   YES    Care Discussed with Consultants : YES     Plan of care was discussed with patient and /or primary care giver; all questions and concerns were addressed and care was aligned with patient's wishes.

## 2024-11-29 NOTE — DISCHARGE NOTE PROVIDER - CARE PROVIDER_API CALL
FATOUMATA GAGNON  [address] [place] [address]  Phone: [phone]  Fax: ()-  Follow Up Time:     Triston Levine [facility]  Surgery  [address] [place] [address]  Phone: [phone]  Fax: [phone]  Follow Up Time:    FATOUMATA GAGNON  [address] [place] [address]  Phone: [phone]  Fax: ()-  Follow Up Time:     Triston Levine [facility]  Surgery  [address] [place] [address]  Phone: [phone]  Fax: [phone]  Follow Up Time: 1 week

## 2024-11-29 NOTE — PROGRESS NOTE ADULT - ATTENDING COMMENTS
54M with PMH HTN. HLD, ESRD on HD MWF via LUE AVF, ascites (requiring repeat paracenteses q4-6wks), NICM with moderate LV dysfunction and CAD NSTEMI 4/7/2024 s/p atherectomy, IVL, SALLIE, intervention of LAD branch D1 (PTCA atherectomy, atherectomy and SALLIE on 4/11/2024), who presented with nausea, vomiting, abdominal pain, and diarrhea that started 2 days ago. Patient was recently admitted a few days ago (11/18-11/25), for which he was found with large volume ascites, s/p paracentesis, and tx with CTX for SBP, transitioned to ceftin 250mg BID for 2 more days. During that admission, he was also planned for EGD but procedure was cancelled so he was advised to follow-up outpatient. Admitted for further evaluation and management.    For surgery today for cholecysectomy. He was seen after surgery, he had left eye pain, had a stye inside the top eyelid of his left eye. Otherwise feeling well, abdominal pain much improved.     mild distress, left eye pain, heart regular, lungs clear, has abdomen drain in place    Plan:  #Nausea/vomiting (resolved)  #Abdominal pain (improved)  #Gallbladder wall thickening   #Biliary obstruction  #Ascites  #HTN  #HLD  #CAD s/p PCI  #HFrEF not in acute exacerbation  #ESRD on HD  #DM  #Preoperative clearance   #Hordeolum    -c/w zofran prn for nausea   -surgery recs appreciated - plan for lap darryl today 11/29  -NPO at midnight, preop labs   -c/w ceftriaxone 1g q24h   -GI recs appreciated  -will likely need therapeutic paracentesis prior to discharge- had drain placed by surgery- reportedly 3.5L of fluids taken out. Will replace albumin  -hold plavix prior to surgery  -can c/w home lisinopril 40mg qd, coreg 12.5mg BID, nifedipine 90mg on non HD days   -nephrology consulted for HD - plan for HD prior to OR as patient missed session yesterday   -monitor FS/ISS  Left eye lesion consistent with hordeolum- treat with tetracaine for pain, stop after 3 doses, erythromycin eye ointment, warm compress and massage
54M with PMH HTN. HLD, ESRD on HD MWF via LUE AVF, ascites (requiring repeat paracenteses q4-6wks), NICM with moderate LV dysfunction and CAD NSTEMI 4/7/2024 s/p atherectomy, IVL, SALLIE, intervention of LAD branch D1 (PTCA atherectomy, atherectomy and SALLIE on 4/11/2024), who presented with nausea, vomiting, abdominal pain, and diarrhea that started 2 days ago. Patient was recently admitted a few days ago (11/18-11/25), for which he was found with large volume ascites, s/p paracentesis, and tx with CTX for SBP, transitioned to ceftin 250mg BID for 2 more days. During that admission, he was also planned for EGD but procedure was cancelled so he was advised to follow-up outpatient. Admitted for further evaluation and management.    Patient seen and examined at bedside. No acute events overnight. Reports feeling much better compared to initial presentation - abdominal pain, nausea, and vomiting have resolved. Has not had a BM since admission. No other complaints at this time.     Labs and imaging reviewed. HIDA scan findings compatible with biliary obstruction, underlying acute cholecystitis is not excluded.   Leukocytosis resolved    Exam as above including NAD, RRR, CTAB, abd distended but soft, minimal TTP right side, no LE edema, AOx3    Plan:  #Nausea/vomiting (resolved)  #Abdominal pain (improved)  #Gallbladder wall thickening   #Biliary obstruction  #Ascites  #HTN  #HLD  #CAD s/p PCI  #HFrEF not in acute exacerbation  #ESRD on HD  #DM  #Preoperative clearance   -c/w zofran prn for nausea   -surgery recs appreciated - plan for lap darryl on 11/29  -NPO at midnight, preop labs   -c/w ceftriaxone 1g q24h   -GI recs appreciated  -will likely need therapeutic paracentesis prior to discharge  -hold plavix prior to surgery  -can c/w home lisinopril 40mg qd, coreg 12.5mg BID, nifedipine 90mg on non HD days   -nephrology consulted for HD - plan for HD prior to OR as patient missed session yesterday   -monitor FS/ISS  -cardiology consulted - clearance noted   -has had hernia surgery in past, denies any surgical or anesthetic complications previously. Able to walk 4 blocks before needing to rest, only 1 flight of stairs due to feeling weak in legs. Stephanie 1.7% risk of myocardial infarction or cardiac arrest, intraoperatively or up to 30 days post-op. RCRI class IV risk which confers 15% 30-day risk of death, MI, or cardiac arrest. Patient is intermediate risk for intermediate risk procedure.

## 2024-11-29 NOTE — PROGRESS NOTE ADULT - PROBLEM SELECTOR PLAN 7
Hx of HTN on nifedipine 90mg on non-HD days, lisionpril 40mg qd, carvedilol 12.5mg bid  c/w home meds

## 2024-11-29 NOTE — PROGRESS NOTE ADULT - PROBLEM SELECTOR PLAN 4
Hx of ascites w/ monthly paracentesis since 1/2021 (~10-15L/month)  Unclear etiology for recurrent ascites. No cirrhosis. Possibly cardiogenic  Last paracentesis on 11/19, fluid w/u showed SBP, SAAG <1.1, fluid protein >5  CT A/P:  Moderate to severe abdominal pelvic ascites, grossly unchanged from 11/18/2024  PEx: + distended, soft, non tender, no guarding or rebound tenderness. No clinical signs of SBP   s/p ceftriaxone 2g qd 11/20-11/25 and cefuroxime 250mg bid on 11/26, s/p ceftriaxone 2g in ED  Consider repeat paracentesis before discharge

## 2024-11-29 NOTE — PROGRESS NOTE ADULT - ASSESSMENT
1. Abdominal pain  2. Transaminitis  3. Cholecystitis  4. Diarrhea  5. R/o gastroenteritis  6. Ascites  7. SBP (subacute bacterial peritonitis)  8. Anemia  9. No evidence of acute GI bleeding    Suggestions:    1. Continue antibiotics  2. ID evaluation  3. Follow up LFT's  4. Monitor electrolytes  5. NPO  6. IVF hydration  7. Surgical follow up  8. Monitor H/H  9. Transfuse PRBC as needed  10. Therapeutic paracentesis as needed  11. Protonix 40mg daily  12. Avoid NSAID  13. DVT prophylaxis

## 2024-11-29 NOTE — BRIEF OPERATIVE NOTE - OPERATION/FINDINGS
Veress entry without injury  Yellow ascites noted with evidence of recent peritonitis  Gallbladder was gangrenous  Top-down approach up to cystic duct  Cholangiogram negative for filling defect  Able to clip cystic duct  Hemostasis achieved  19fr Rufus drain place in GB fossa.

## 2024-11-29 NOTE — PROGRESS NOTE ADULT - ASSESSMENT
Rm Stratton Raeligh  : 1952  Primary: [facility] Medicare Advantage (Medicare Managed)  Secondary:  [facility] [place] [address]AYY Gallegos  [facility] [address]  Phone: [phone]  Fax: [phone] Plan Frequency: 2x/wk x 90days  Plan of Care/Certification Expiration Date: 24        Plan of Care/Certification Expiration Date:  Plan of Care/Certification Expiration Date: 24    Frequency/Duration:Plan Frequency: 2x/wk x 90days     Time In/Out:   Time In: 0850  Time Out: 09      PT Visit Info:    Plan Frequency: 2x/wk x 90days  Progress Note Counter: 1      Visit Count:  1                OUTPATIENT PHYSICAL THERAPY:             Initial Assessment 2024               Episode (LBP)         Treatment Diagnosis:     Other abnormalities of gait and mobility  Other low back pain  Pain in left hip  Medical/Referring Diagnosis:    Left hip pain [M25.552]  Radicular pain of left lower extremity [M54.10]      Referring Provider:  Jihan Mejia APRN - CNP  MD Orders:  PT Eval and Treat   Return MD Appt:    Date of Onset:      Allergies:  Penicillins  Restrictions/Precautions:      Medications Last Reviewed:  2024     SUBJECTIVE   History of Injury/Illness (Reason for Referral):  Pt is a 72 yo male referred to physical therapy due to pain in his left glutes. He had deep pian in his lef glutes and left lumbar spine to his left foot about 4 weeks ago. Anterior left thigh to the top of his left foot. He also has pain in the arch of his right foot. He has had active prostate cancer and has for years that has been stable. He says his PSA has consistently been at 10, and he had a PET scan about a year ago without METs.     Pain/Symptom Location: left LS, L glutes, anterior left thigh to the top of his left foot.          Aggravating Factors/ Functional limitations: pain/difficulty with getting in/out of his vehicle, twisting/turing to his left, putting shoes   54M with PMHx of ESRD on HD via LUE AVF (M/W/F), HFrEF (10/24 EF 45%), CAD s/p PCI, recurrent ascites (gets 10-15L removed every 5-6 weeks), HTN, DM (not on meds), HLD who presented to the hospital for intractable vomiting and diarrhea. T 97.9F, HR 66, /70, O2 99% in RA.  CT A/P showed nonspecific gallbladder wall edema/pericholecystic fluid may be reactive. Admitted for abdominal pain and intractable diarrhea.          # Thickening of wall of gallbladder with pericholecystic fluid.   ·  Plan: CT A/P: Nonspecific gallbladder wall edema/pericholecystic fluid may be reactive.  HIDA scan: Findings compatible with biliary obstruction. Acute cholecystitis cannot be ruled out   Scheduled for Lap Darryl    - Overall this patient is at   intermediate risk (for cardiac death, nonfatal myocardial infarction, and nonfatal cardiac arrest perioperatively for this intermediate risk procedure).     The patient is however without evidence of ACS, Decompensated Heart Failure,Obstructive Valvular Heart disease or Unstable arrhythmia.   There  are  no further recommendation for risk stratifying imaging/stress testing prior to planned surgery    # Ascites.   ·  Plan: Hx of ascites w/ monthly paracentesis since 1/2021 (~10-15L/month)  Unclear etiology for recurrent ascites. No cirrhosis. Possibly cardiogenic  Last paracentesis on 11/19, fluid w/u showed SBP, SAAG <1.1, fluid protein >5  CT A/P:  Moderate to severe abdominal pelvic ascites, grossly unchanged from 11/18/2024    # ESRD on hemodialysis.     # HFrEF (heart failure with reduced ejection fraction).   ·  Plan: Hx of HFrEF on carvedilol 12.5mg bid, lisinopril 40mg qd  TTE (10/24): LVEF 46%, GIIIDD, Moderate TR, Moderate pulmonary HTN, small pericardial effusion.        #  CAD S/P percutaneous coronary angioplasty.   ·  Plan: Hx of 3 vessel CAD s/p PCI to LAD-mid, D1, LCx (SALLIE) on 4/24 on aspirin 81mg qd and plavix 75mg qd   Patient has been on DAPT for > 6 months  Can hold Plavix prior to Lap darryl

## 2024-11-29 NOTE — DISCHARGE NOTE PROVIDER - ATTENDING DISCHARGE PHYSICAL EXAMINATION:
Seen this AM, sitting up in bed, no distress, no further eye pain. Heart regular, lungs clear, abdomen with right lower PITA drain, abdomen is softer than previous. No lower ext swelling.     55 yo male with chfref, cad on plavix, esrd on hd, HTN, HLD, who presented with abdominal pain, concerning for acute cholecystitis, he underwent cholecysectomy 11/29. Post op complicated by a stye in the left eye. FOr discharge today with outpatient surgery followup. Patient was recommended to remove PITA drain, he declined it prior to discharge

## 2024-11-29 NOTE — DISCHARGE NOTE PROVIDER - PROVIDER TOKENS
PROVIDER:[TOKEN:[[id number]]],PROVIDER:[TOKEN:[[id number]]] PROVIDER:[TOKEN:[[id number]]],PROVIDER:[TOKEN:[[id number]],FOLLOWUP:[1 week]]

## 2024-11-29 NOTE — PROGRESS NOTE ADULT - ASSESSMENT
Patient is a 54M with PMHx of ESRD on HD via LUE AVF (M/W/F), HFrEF (10/24 EF 45%), CAD s/p PCI, recurrent ascites (gets 10-15L removed every 5-6 weeks), HTN, DM (not on meds), HLD who presented to the hospital for intractable vomiting and diarrhea. T 97.9F, HR 66, /70, O2 99% in RA.  CT A/P showed nonspecific gallbladder wall edema/pericholecystic fluid may be reactive. Admitted for abdominal pain and intractable diarrhea. HIDA scan positive for biliary obstruction and acute cholecystitis. Patient for cholecystecomy today 11/29.

## 2024-11-29 NOTE — PROGRESS NOTE ADULT - ASSESSMENT
54 year old male with acute cholecystitis.   last HD done yesterday 11/28    - plan for laparoscopic cholecystectomy today 11/29  - medical/cardio clearance noted  - hold ASA/Plavix if medically safe  - discussed with Dr. Levine  54 year old male with acute cholecystitis.   +HIDA scan  last HD done yesterday 11/28    - plan for laparoscopic cholecystectomy today 11/29  - medical/cardio clearance noted  - hold ASA/Plavix if medically safe  - discussed with Dr. Levine

## 2024-11-29 NOTE — PROGRESS NOTE ADULT - PROBLEM SELECTOR PLAN 2
Patient pending procedure, risk stratification as below:  RCRI = 3 points. 15% 30-day risk of death, MI, or cardiac arrest  Brown score = 0.2% Risk of myocardial infarction or cardiac arrest, intraoperatively or up to 30 days post-op  Overall in consideration of pt's prior medical history they have been optimized for procedure from a medical standpoint    Cardiology Dr. Quick consulted  Resume ASA and Plavix s/p procedure

## 2024-11-29 NOTE — DISCHARGE NOTE PROVIDER - NSDCCPCAREPLAN_GEN_ALL_CORE_FT
PRINCIPAL DISCHARGE DIAGNOSIS  Diagnosis: Acute cholecystitis  Assessment and Plan of Treatment:       SECONDARY DISCHARGE DIAGNOSES  Diagnosis: Gallbladder sludge  Assessment and Plan of Treatment:     Diagnosis: ESRD on hemodialysis  Assessment and Plan of Treatment:     Diagnosis: HTN (hypertension)  Assessment and Plan of Treatment:     Diagnosis: CAD S/P percutaneous coronary angioplasty  Assessment and Plan of Treatment:     Diagnosis: HFrEF (heart failure with reduced ejection fraction)  Assessment and Plan of Treatment:      PRINCIPAL DISCHARGE DIAGNOSIS  Diagnosis: Acute cholecystitis  Assessment and Plan of Treatment: You had your Gallbladder removed.  You have Right PITA drain. Please follow the directions from the Nursing education your recieved.  Please follow up with your Surgeon in 1 week for further medical management.   If you have pain and discomfort call your Surgeon.      SECONDARY DISCHARGE DIAGNOSES  Diagnosis: ESRD on hemodialysis  Assessment and Plan of Treatment: Follow up with Nephrology and Dialysis center as prescribed by Nephrologist on the scheduled days.    Diagnosis: HTN (hypertension)  Assessment and Plan of Treatment: Continue Lasix    Diagnosis: CAD S/P percutaneous coronary angioplasty  Assessment and Plan of Treatment: Hold Plavix until 12/4/24 since you had surgery recently on 11/29/24  Please follow up with Cardiologist in 1 week for further medical management.    Diagnosis: HFrEF (heart failure with reduced ejection fraction)  Assessment and Plan of Treatment: Continue Lasix    Diagnosis: HLD (hyperlipidemia)  Assessment and Plan of Treatment: Hold your cholesterol medication as your liver enzymes were elevated.   Please follow up with Cardologist and PCP in 1 week for further medication and medical management.     PRINCIPAL DISCHARGE DIAGNOSIS  Diagnosis: Acute cholecystitis  Assessment and Plan of Treatment: You had your Gallbladder removed.  You have Right PITA drain. Please follow the directions from the Nursing education your recieved.  Please follow up with your Surgeon in 1 week for further followup and management of your drain.  We recommended to remove your drain prior to discharge, however you opted to leave it in until followup.  If you have pain and discomfort call your Surgeon.      SECONDARY DISCHARGE DIAGNOSES  Diagnosis: ESRD on hemodialysis  Assessment and Plan of Treatment: Follow up with Nephrology and Dialysis center as prescribed by Nephrologist on the scheduled days.    Diagnosis: HTN (hypertension)  Assessment and Plan of Treatment: Continue with prvious home medications: nifedipine, carvedilol, hydralazine, lisinopril and furosemide    Diagnosis: CAD S/P percutaneous coronary angioplasty  Assessment and Plan of Treatment: You have a history of CAD on clopidogrel(Plavix).  Please DO NOT take clopidogrel until December 9th. Until then, please take aspirin 81mg once a day  Please follow up with Cardiologist in 1-2 weeks to inform them of your current hospitalization    Diagnosis: HFrEF (heart failure with reduced ejection fraction)  Assessment and Plan of Treatment: Noted with history of CHFrEF. Please continue followup with your cardiologist outpatient. Continue carvedilol, lisinopril, furosemide.    Diagnosis: HLD (hyperlipidemia)  Assessment and Plan of Treatment: Hold your cholesterol medication as your liver enzymes were elevated.   Please follow up with Cardologist and PCP in 1 week for further medication and medical management.

## 2024-11-29 NOTE — PROGRESS NOTE ADULT - SUBJECTIVE AND OBJECTIVE BOX
PATIENT SEEN AND EXAMINED BY JEAN PAUL GABRIEL M.D. ON :- 11/29/24  DATE OF SERVICE:       11/29/24      Interim events noted,Labs ,Radiological studies and Cardiology tests reviewed .    Patient is a 54y old  Male who presents with a chief complaint of Vomiting and diarrhea (29 Nov 2024 13:47)      HPI:  Patient is a 54M with PMHx of ESRD on HD via LUE AVF (M/W/F), HFrEF (10/24 EF 45%), CAD s/p PCI, recurrent ascites (gets 10-15L removed every 5-6 weeks), HTN, DM (not on meds), HLD who presented to the hospital for intractable vomiting and diarrhea. Patient was admitted 11/18 - 11/25 for SBP. Patient reports took his cefuroxime as prescribed yesterday. He ate hot chicken wings and Pina colada shake for dinner and 2h later he started having NBNB vomiting and non-bloody watery diarrhea. He reports being up all night due to diarrhea. He reports >10 episodes of diarrhea and >5 episodes of vomiting. He reports diffuse abdominal pain but more focally in the epigastric area. He denies chest pain, palpitation, SOB, fever, chills, cough arm or leg weakness.      (27 Nov 2024 16:13)      PAST MEDICAL & SURGICAL HISTORY:  Type 2 diabetes mellitus      Hypertension      End stage renal disease  started HD 2/2019 T, Th, Sat via right chest permacath      Bone spur  right shoulder- hx of - sx done      Anemia      Injury of right wrist  hx of at age 14      History of hyperkalemia  before HD- K-6.2 - to repeat in am of sx, pt had HD today      S/P arthroscopy of right shoulder  2010      History of vascular access device  right chest permacath - 2/2019      H/O right wrist surgery  tendons repair - at age 14      AV fistula      H/O ventral hernia repair          PREVIOUS DIAGNOSTIC TESTING:      ECHO  FINDINGS:    STRESS  FINDINGS:    CATHETERIZATION  FINDINGS:    MEDICATIONS  (STANDING):  carvedilol 12.5 milliGRAM(s) Oral every 12 hours  cefTRIAXone   IVPB 1000 milliGRAM(s) IV Intermittent every 24 hours  chlorhexidine 2% Cloths 1 Application(s) Topical daily  lisinopril 40 milliGRAM(s) Oral daily  NIFEdipine XL 90 milliGRAM(s) Oral <User Schedule>  pantoprazole    Tablet 40 milliGRAM(s) Oral before breakfast  sodium chloride 0.9%. 1000 milliLiter(s) (50 mL/Hr) IV Continuous <Continuous>  traZODone 100 milliGRAM(s) Oral at bedtime    MEDICATIONS  (PRN):  acetaminophen     Tablet .. 650 milliGRAM(s) Oral every 6 hours PRN Temp greater or equal to 38C (100.4F), Mild Pain (1 - 3)  HYDROmorphone  Injectable 0.5 milliGRAM(s) IV Push every 10 minutes PRN Severe Pain (7 - 10)  ondansetron Injectable 4 milliGRAM(s) IV Push every 8 hours PRN Nausea and/or Vomiting  ondansetron Injectable 4 milliGRAM(s) IV Push once PRN Nausea and/or Vomiting  oxyCODONE    IR 5 milliGRAM(s) Oral once PRN Moderate Pain (4 - 6)  tetracaine 0.5% Solution 1 Drop(s) Left EYE every 5 minutes PRN Pain      FAMILY HISTORY:  FH: hypertension  mother, father- alive    FH: type 2 diabetes  mother, father- alive        SOCIAL HISTORY:    CIGARETTES:    ALCOHOL:    REVIEW OF SYSTEMS:  CONSTITUTIONAL: No fever, weight loss, or fatigue  EYES: No eye pain, visual disturbances, or discharge  ENMT:  No difficulty hearing, tinnitus, vertigo; No sinus or throat pain  NECK: No pain or stiffness  RESPIRATORY: No cough, wheezing, chills or hemoptysis; No shortness of breath  CARDIOVASCULAR: No chest pain, palpitations, dizziness, or leg swelling  GASTROINTESTINAL: No abdominal or epigastric pain. No nausea, vomiting, or hematemesis; No diarrhea or constipation. No melena or hematochezia.  GENITOURINARY: No dysuria, frequency, hematuria, or incontinence  NEUROLOGICAL: No headaches, memory loss, loss of strength, numbness, or tremors  SKIN: No itching, burning, rashes, or lesions   LYMPH NODES: No enlarged glands  ENDOCRINE: No heat or cold intolerance; No hair loss  MUSCULOSKELETAL: No joint pain or swelling; No muscle, back, or extremity pain  PSYCHIATRIC: No depression, anxiety, mood swings, or difficulty sleeping  HEME/LYMPH: No easy bruising, or bleeding gums  ALLERY AND IMMUNOLOGIC: No hives or eczema    Vital Signs Last 24 Hrs  T(C): 37 (29 Nov 2024 15:03), Max: 37 (29 Nov 2024 14:18)  T(F): 98.6 (29 Nov 2024 15:03), Max: 98.6 (29 Nov 2024 14:18)  HR: 63 (29 Nov 2024 16:17) (58 - 66)  BP: 158/75 (29 Nov 2024 16:17) (131/65 - 158/75)  BP(mean): 101 (29 Nov 2024 16:17) (83 - 101)  RR: 13 (29 Nov 2024 16:17) (12 - 20)  SpO2: 97% (29 Nov 2024 16:17) (92% - 97%)    Parameters below as of 29 Nov 2024 16:17  Patient On (Oxygen Delivery Method): room air          PHYSICAL EXAM:  GENERAL: NAD, well-groomed, well-developed  HEAD:  Atraumatic, Normocephalic  EYES: EOMI, PERRLA, conjunctiva and sclera clear  ENMT: No tonsillar erythema, exudates, or enlargement; Moist mucous membranes, Good dentition, No lesions  NECK: Supple, No JVD, Normal thyroid  NERVOUS SYSTEM:  Alert & Oriented X3, Good concentration; Motor Strength 5/5 B/L upper and lower extremities; DTRs 2+ intact and symmetric  CHEST/LUNG: Clear to percussion bilaterally; No rales, rhonchi, wheezing, or rubs  HEART: Regular rate and rhythm; No murmurs, rubs, or gallops  ABDOMEN: Soft, Nontender, Nondistended; Bowel sounds present  EXTREMITIES:  2+ Peripheral Pulses, No clubbing, cyanosis, or edema  LYMPH: No lymphadenopathy noted  SKIN: No rashes or lesions      INTERPRETATION OF TELEMETRY:    ECG:    ALONZOVAS:     LABS:                        9.9    8.85  )-----------( 233      ( 29 Nov 2024 06:05 )             28.4     11-29    141  |  107  |  53[H]  ----------------------------<  140[H]  4.4   |  28  |  8.37[H]    Ca    8.3[L]      29 Nov 2024 06:05  Phos  3.9     11-29  Mg     2.1     11-29    TPro  6.2  /  Alb  2.5[L]  /  TBili  1.7[H]  /  DBili  x   /  AST  256[H]  /  ALT  214[H]  /  AlkPhos  287[H]  11-29        PT/INR - ( 29 Nov 2024 06:05 )   PT: 13.4 sec;   INR: 1.15 ratio         PTT - ( 29 Nov 2024 06:05 )  PTT:31.6 sec  Urinalysis Basic - ( 29 Nov 2024 06:05 )    Color: x / Appearance: x / SG: x / pH: x  Gluc: 140 mg/dL / Ketone: x  / Bili: x / Urobili: x   Blood: x / Protein: x / Nitrite: x   Leuk Esterase: x / RBC: x / WBC x   Sq Epi: x / Non Sq Epi: x / Bacteria: x      Lipid Panel:   I&O's Summary    28 Nov 2024 07:01  -  29 Nov 2024 07:00  --------------------------------------------------------  IN: 500 mL / OUT: 2000 mL / NET: -1500 mL    29 Nov 2024 07:01  -  29 Nov 2024 16:46  --------------------------------------------------------  IN: 200 mL / OUT: 330 mL / NET: -130 mL        RADIOLOGY & ADDITIONAL STUDIES:    < from: Transthoracic Echocardiogram (11.04.23 @ 16:00) >  CONCLUSIONS:  1. Trace mitral regurgitation.  2.  Trace aortic regurgitation.  3. Moderately dilated left atrium.  LA volume index = 46  cc/m2.  4. Severe left ventricular enlargement.  5. Moderate global left ventricular systolic dysfunction.  6. Grade III-IV diastolic dysfunction (severe).  7. Right ventricle not well visualized.    Probably normal  right ventricular size and systolic function.  8. Trace pericardial effusion.    ------------------------------------------------------------------------  Confirmed on  11/5/2023 - 15:50:34 by Deep Benoit MD  ------------------------------------------------------------------------    < end of copied text >

## 2024-11-29 NOTE — PACU DISCHARGE NOTE - COMMENTS
pt. awake, alert, complained of severe left eye pain with slight swelling, tearing and sensitivity to touch. Exam only possible after two drops of tetracaine i.o. , revealed a reddish, tender lesion under the upper eyelid, laterally. The MD taking care of him on the floor, Dr. Cuevas, evaluated him as well, pt. will be placed on antibiotic eye drops and warm compresses, consider Ophthalmology consult.  No anesthesia complications noted

## 2024-11-29 NOTE — PROGRESS NOTE ADULT - SUBJECTIVE AND OBJECTIVE BOX
[   ] ICU                                          [   ] CCU                                      [  X ] Medical Floor    Patient is a 54 year old male with abdominal pain, diarrhea and vomiting. GI consulted to evaluate.         Patient is a 54 year old male with past medical history significant for HTN, DM, Hyperlipidemia, ESRD on HD, CAD, CHF recently treated for ascites with SBP presented to the emergency room with 2 days history of watery, non bloody diarrhea associated with intermittent diffuse 8/10 intensity non radiating crampy abdominal pain and intractable non bloody vomiting. Patient reported >10 episodes of diarrhea and >5 episodes of vomiting. Patient denies hematemesis, hematochezia, melena, fever, chills, chest pain, SOOB, cough, hematuria, dysuria or recent traveling.     Patient appears comfortable. No new complaints reported, No abdominal pain, N/V, hematemesis, hematochezia, melena, fever, chills, chest pain, SOB, cough or diarrhea reported.            PAST MEDICAL HISTORY    Type 2 diabetes mellitus    Hypertension    HLD (hyperlipidemia)    ESRD on HD    Bone spur    Anemia    Injury of right wrist         PAST SURGICAL HISTORY    Arthroscopy of right shoulder    Vascular access device    Right wrist surgery    AV fistula    Ventral hernia repair        Allergies    No Known Allergies    Intolerances  None          SOCIAL HISTORY  Advanced Directives:       [ X ] Full Code       [  ] DNR  Marital Status:         [  ] M      [  X] S      [  ] D       [  ] W  Children:       [ X ] Yes      [  ] No  Occupation:        [  ] Employed       [ X ] Unemployed       [  ] Retired  Diet:       [ X ] Regular       [  ] PEG feeding          [  ] NG tube feeding  Drug Use:           [ X ] Patient denied          [  ] Yes  Alcohol:           [ X ] No             [  ] Yes (socially)         [  ] Yes (chronic)  Tobacco:           [  ] Yes           [ X ] No        FAMILY HISTORY  [ X ] Heart Disease            [ X ] Diabetes             [ X ] HTN             [  ] Colon Cancer             [  ] Stomach Cancer              [  ] Pancreatic Cancer        VITALS   Vital Signs Last 24 Hrs  T(C): 36.7 (11-29-24 @ 08:53), Max: 36.7 (11-29-24 @ 06:11)  T(F): 98.1 (11-29-24 @ 08:53), Max: 98.1 (11-29-24 @ 06:11)  HR: 62 (11-29-24 @ 08:53) (57 - 66)  BP: 148/66 (11-29-24 @ 08:53) (108/61 - 148/66)   RR: 19 (11-29-24 @ 08:53) (18 - 20)  SpO2: 92% (11-29-24 @ 08:53) (92% - 96%)      MEDICATIONS  (STANDING):  carvedilol 12.5 milliGRAM(s) Oral every 12 hours  cefTRIAXone   IVPB 1000 milliGRAM(s) IV Intermittent every 24 hours  chlorhexidine 2% Cloths 1 Application(s) Topical daily  heparin   Injectable 5000 Unit(s) SubCutaneous every 8 hours  lisinopril 40 milliGRAM(s) Oral daily  NIFEdipine XL 90 milliGRAM(s) Oral <User Schedule>  pantoprazole    Tablet 40 milliGRAM(s) Oral before breakfast  traZODone 100 milliGRAM(s) Oral at bedtime    MEDICATIONS  (PRN):  acetaminophen     Tablet .. 650 milliGRAM(s) Oral every 6 hours PRN Temp greater or equal to 38C (100.4F), Mild Pain (1 - 3)  ondansetron Injectable 4 milliGRAM(s) IV Push every 8 hours PRN Nausea and/or Vomiting                            9.9    8.85  )-----------( 233      ( 29 Nov 2024 06:05 )             28.4       11-29    141  |  107  |  53[H]  ----------------------------<  140[H]  4.4   |  28  |  8.37[H]    Ca    8.3[L]      29 Nov 2024 06:05  Phos  3.9     11-29  Mg     2.1     11-29    TPro  6.2  /  Alb  2.5[L]  /  TBili  1.7[H]  /  DBili  x   /  AST  256[H]  /  ALT  214[H]  /  AlkPhos  287[H]  11-29      PT/INR - ( 29 Nov 2024 06:05 )   PT: 13.4 sec;   INR: 1.15 ratio         PTT - ( 29 Nov 2024 06:05 )  PTT:31.6 sec

## 2024-11-29 NOTE — PROGRESS NOTE ADULT - PROBLEM SELECTOR PLAN 1
CT A/P: Nonspecific gallbladder wall edema/pericholecystic fluid may be reactive.  RUQ US: GB sludge. Gallbladder wall thickening and trace pericholecystic fluid  may be reactive  Nael 0.4, ALP 84  PEx: Angel's sign neg. No guarding or rebound tenderness  s/p ceftriaxone 2g in ED       GI Dr. Cervantes consulted   HIDA scan: Findings compatible with biliary obstruction. Acute cholecystitis cannot be ruled out   c/w ceftriaxone 1g   Surgery following: for lap darryl 11/29   preop labs, NPO after MN, preop risk stratification  Resume diet, ASA, Plavix s/p procedure

## 2024-11-29 NOTE — PROGRESS NOTE ADULT - PROBLEM SELECTOR PLAN 3
p/w 1d history of >10 episodes of diarrhea, >5 episodes of NBNB vomiting   Supportive therapy - zofran PRN (QTc 470)  No fluids given ESRD status   C diff neg   CLD, advance diet as tolerated     11/28; denies N/V, or further episodes of diarrhea  will continue to monitor BM  f/u GI PCR  Resume diet s/p procedure

## 2024-11-29 NOTE — DISCHARGE NOTE PROVIDER - NSDCFUSCHEDAPPT_GEN_ALL_CORE_FT
Triston Levine  [facility]  [facility] [address] [facility]  Scheduled Appointment: 12/19/2024

## 2024-11-29 NOTE — PROGRESS NOTE ADULT - SUBJECTIVE AND OBJECTIVE BOX
INTERVAL HPI/OVERNIGHT EVENTS:  Pt seen and examined at bedside. Patient reports only having RUQ pain when touching the area. No acute complaints.     Vital Signs Last 24 Hrs  T(C): 36.7 (29 Nov 2024 08:53), Max: 36.7 (29 Nov 2024 06:11)  T(F): 98.1 (29 Nov 2024 08:53), Max: 98.1 (29 Nov 2024 06:11)  HR: 62 (29 Nov 2024 08:53) (57 - 66)  BP: 148/66 (29 Nov 2024 08:53) (108/61 - 148/66)  BP(mean): --  RR: 19 (29 Nov 2024 08:53) (18 - 20)  SpO2: 92% (29 Nov 2024 08:53) (92% - 96%)    Parameters below as of 29 Nov 2024 06:11  Patient On (Oxygen Delivery Method): room air      I&O's Detail    28 Nov 2024 07:01  -  29 Nov 2024 07:00  --------------------------------------------------------  IN:    Other (mL): 500 mL  Total IN: 500 mL    OUT:    Other (mL): 2000 mL  Total OUT: 2000 mL    Total NET: -1500 mL          Medications:  cefTRIAXone   IVPB 1000 milliGRAM(s) IV Intermittent every 24 hours  pantoprazole    Tablet 40 milliGRAM(s) Oral before breakfast      Physical Exam:  General: AAOx3, No acute distress  HEENT: NC/AT, trachea midline  Respiratory: Nonlabored breathing, equal chest rise b/l   Abdomen: soft distended/firm, tenderness in the epigastric region, no rebound tenderness, no guarding, no palpable masses  : Vigil***  Extremities: non edematous, no calf pain bilaterally      Drains/Tubes:     11-28-24 @ 07:01  -  11-29-24 @ 07:00  --------------------------------------------------------  IN: 500 mL / OUT: 2000 mL / NET: -1500 mL        Labs:                        9.9    8.85  )-----------( 233      ( 29 Nov 2024 06:05 )             28.4     11-29    141  |  107  |  53[H]  ----------------------------<  140[H]  4.4   |  28  |  8.37[H]    Ca    8.3[L]      29 Nov 2024 06:05  Phos  3.9     11-29  Mg     2.1     11-29    TPro  6.2  /  Alb  2.5[L]  /  TBili  1.7[H]  /  DBili  x   /  AST  256[H]  /  ALT  214[H]  /  AlkPhos  287[H]  11-29    PT/INR - ( 29 Nov 2024 06:05 )   PT: 13.4 sec;   INR: 1.15 ratio         PTT - ( 29 Nov 2024 06:05 )  PTT:31.6 sec

## 2024-11-30 LAB
ALBUMIN SERPL ELPH-MCNC: 2.7 G/DL — LOW (ref 3.5–5)
ALP SERPL-CCNC: 269 U/L — HIGH (ref 40–120)
ALT FLD-CCNC: 107 U/L DA — HIGH (ref 10–60)
ANION GAP SERPL CALC-SCNC: 9 MMOL/L — SIGNIFICANT CHANGE UP (ref 5–17)
AST SERPL-CCNC: 158 U/L — HIGH (ref 10–40)
BILIRUB SERPL-MCNC: 1 MG/DL — SIGNIFICANT CHANGE UP (ref 0.2–1.2)
BUN SERPL-MCNC: 65 MG/DL — HIGH (ref 7–18)
CALCIUM SERPL-MCNC: 7.9 MG/DL — LOW (ref 8.4–10.5)
CHLORIDE SERPL-SCNC: 102 MMOL/L — SIGNIFICANT CHANGE UP (ref 96–108)
CO2 SERPL-SCNC: 25 MMOL/L — SIGNIFICANT CHANGE UP (ref 22–31)
CREAT SERPL-MCNC: 9.87 MG/DL — HIGH (ref 0.5–1.3)
EGFR: 6 ML/MIN/1.73M2 — LOW
GLUCOSE BLDC GLUCOMTR-MCNC: 153 MG/DL — HIGH (ref 70–99)
GLUCOSE BLDC GLUCOMTR-MCNC: 155 MG/DL — HIGH (ref 70–99)
GLUCOSE BLDC GLUCOMTR-MCNC: 169 MG/DL — HIGH (ref 70–99)
GLUCOSE SERPL-MCNC: 194 MG/DL — HIGH (ref 70–99)
HCT VFR BLD CALC: 27.6 % — LOW (ref 39–50)
HGB BLD-MCNC: 9.3 G/DL — LOW (ref 13–17)
MAGNESIUM SERPL-MCNC: 2 MG/DL — SIGNIFICANT CHANGE UP (ref 1.6–2.6)
MCHC RBC-ENTMCNC: 31.4 PG — SIGNIFICANT CHANGE UP (ref 27–34)
MCHC RBC-ENTMCNC: 33.7 G/DL — SIGNIFICANT CHANGE UP (ref 32–36)
MCV RBC AUTO: 93.2 FL — SIGNIFICANT CHANGE UP (ref 80–100)
NRBC # BLD: 0 /100 WBCS — SIGNIFICANT CHANGE UP (ref 0–0)
PHOSPHATE SERPL-MCNC: 4.3 MG/DL — SIGNIFICANT CHANGE UP (ref 2.5–4.5)
PLATELET # BLD AUTO: 238 K/UL — SIGNIFICANT CHANGE UP (ref 150–400)
POTASSIUM SERPL-MCNC: 5 MMOL/L — SIGNIFICANT CHANGE UP (ref 3.5–5.3)
POTASSIUM SERPL-SCNC: 5 MMOL/L — SIGNIFICANT CHANGE UP (ref 3.5–5.3)
PROT SERPL-MCNC: 6.3 G/DL — SIGNIFICANT CHANGE UP (ref 6–8.3)
RBC # BLD: 2.96 M/UL — LOW (ref 4.2–5.8)
RBC # FLD: 15.8 % — HIGH (ref 10.3–14.5)
SODIUM SERPL-SCNC: 136 MMOL/L — SIGNIFICANT CHANGE UP (ref 135–145)
WBC # BLD: 13.86 K/UL — HIGH (ref 3.8–10.5)
WBC # FLD AUTO: 13.86 K/UL — HIGH (ref 3.8–10.5)

## 2024-11-30 PROCEDURE — 99233 SBSQ HOSP IP/OBS HIGH 50: CPT

## 2024-11-30 RX ORDER — HYDROMORPHONE HYDROCHLORIDE 2 MG/1
0.5 TABLET ORAL EVERY 4 HOURS
Refills: 0 | Status: DISCONTINUED | OUTPATIENT
Start: 2024-11-30 | End: 2024-12-02

## 2024-11-30 RX ORDER — ERYTHROPOIETIN 3000 [IU]/ML
4000 INJECTION, SOLUTION INTRAVENOUS; SUBCUTANEOUS
Refills: 0 | Status: DISCONTINUED | OUTPATIENT
Start: 2024-11-30 | End: 2024-12-02

## 2024-11-30 RX ADMIN — HYDROMORPHONE HYDROCHLORIDE 0.5 MILLIGRAM(S): 2 TABLET ORAL at 06:13

## 2024-11-30 RX ADMIN — TRAZODONE HYDROCHLORIDE 100 MILLIGRAM(S): 150 TABLET ORAL at 22:12

## 2024-11-30 RX ADMIN — Medication 1 APPLICATION(S): at 17:16

## 2024-11-30 RX ADMIN — Medication 1 APPLICATION(S): at 23:08

## 2024-11-30 RX ADMIN — ACETAMINOPHEN 500MG 650 MILLIGRAM(S): 500 TABLET, COATED ORAL at 11:10

## 2024-11-30 RX ADMIN — HYDROMORPHONE HYDROCHLORIDE 0.5 MILLIGRAM(S): 2 TABLET ORAL at 13:27

## 2024-11-30 RX ADMIN — Medication 100 MILLIGRAM(S): at 17:17

## 2024-11-30 RX ADMIN — HYDROMORPHONE HYDROCHLORIDE 0.5 MILLIGRAM(S): 2 TABLET ORAL at 07:00

## 2024-11-30 RX ADMIN — ACETAMINOPHEN 500MG 650 MILLIGRAM(S): 500 TABLET, COATED ORAL at 09:58

## 2024-11-30 RX ADMIN — LISINOPRIL 40 MILLIGRAM(S): 20 TABLET ORAL at 05:46

## 2024-11-30 RX ADMIN — PANTOPRAZOLE SODIUM 40 MILLIGRAM(S): 40 TABLET, DELAYED RELEASE ORAL at 06:12

## 2024-11-30 RX ADMIN — Medication 1 APPLICATION(S): at 06:14

## 2024-11-30 RX ADMIN — Medication 1 APPLICATION(S): at 00:19

## 2024-11-30 RX ADMIN — CARVEDILOL 12.5 MILLIGRAM(S): 25 TABLET, FILM COATED ORAL at 06:15

## 2024-11-30 RX ADMIN — CARVEDILOL 12.5 MILLIGRAM(S): 25 TABLET, FILM COATED ORAL at 17:16

## 2024-11-30 RX ADMIN — Medication 90 MILLIGRAM(S): at 05:46

## 2024-11-30 RX ADMIN — HYDROMORPHONE HYDROCHLORIDE 0.5 MILLIGRAM(S): 2 TABLET ORAL at 12:14

## 2024-11-30 RX ADMIN — ENOXAPARIN SODIUM 30 MILLIGRAM(S): 30 INJECTION SUBCUTANEOUS at 14:48

## 2024-11-30 NOTE — PROGRESS NOTE ADULT - SUBJECTIVE AND OBJECTIVE BOX
NEPHROLOGY MEDICAL CARE, [facility] - Dr. Domenic Griffiths/ Dr. Bishnu Amador/ Dr. Fili Smiley/ Dr. Naomie Crowder    Date of Service: 11-30-24    Patient was seen and examined at bedside.    CC: patient is okay and NAD  tolerating HD.    Vital Signs Last 24 Hrs  T(C): 36.6 (30 Nov 2024 08:15), Max: 37 (29 Nov 2024 14:18)  T(F): 97.8 (30 Nov 2024 08:15), Max: 98.6 (29 Nov 2024 14:18)  HR: 58 (30 Nov 2024 08:15) (58 - 67)  BP: 150/72 (30 Nov 2024 08:15) (131/65 - 176/80)  BP(mean): 98 (30 Nov 2024 08:15) (83 - 112)  RR: 18 (30 Nov 2024 08:15) (12 - 19)  SpO2: 97% (30 Nov 2024 08:15) (93% - 99%)    Parameters below as of 30 Nov 2024 08:15  Patient On (Oxygen Delivery Method): room air        11-29 @ 07:01  -  11-30 @ 07:00  --------------------------------------------------------  IN: 200 mL / OUT: 1410 mL / NET: -1210 mL        PHYSICAL EXAM:  General: No acute respiratory distress.  Eyes: conjunctiva and sclera clear  ENMT: Atraumatic, Normocephalic  Respiratory:   Bilaterally clear lungs; No rales, rhonchi, wheezing  Cardiovascular: S1S2+; no m/r/g  Gastrointestinal: Soft, mild tenderness, Nondistended; Bowel sounds present  Neuro:  Awake, Alert & Oriented X3  Ext:  No edema, No Cyanosis  Skin: No rashes  Dialysis Access::   Left upper arm AVF thrill/bruit+       MEDICATIONS:  MEDICATIONS  (STANDING):  carvedilol 12.5 milliGRAM(s) Oral every 12 hours  cefTRIAXone   IVPB 1000 milliGRAM(s) IV Intermittent every 24 hours  chlorhexidine 2% Cloths 1 Application(s) Topical daily  enoxaparin Injectable 30 milliGRAM(s) SubCutaneous every 24 hours  erythromycin   Ointment 1 Application(s) Left EYE four times a day  lisinopril 40 milliGRAM(s) Oral daily  NIFEdipine XL 90 milliGRAM(s) Oral <User Schedule>  pantoprazole    Tablet 40 milliGRAM(s) Oral before breakfast  traZODone 100 milliGRAM(s) Oral at bedtime    MEDICATIONS  (PRN):  acetaminophen     Tablet .. 650 milliGRAM(s) Oral every 6 hours PRN Temp greater or equal to 38C (100.4F), Mild Pain (1 - 3)  HYDROmorphone  Injectable 0.5 milliGRAM(s) IV Push every 4 hours PRN Severe Pain (7 - 10)  ondansetron Injectable 4 milliGRAM(s) IV Push every 8 hours PRN Nausea and/or Vomiting          LABS:                        9.3    13.86 )-----------( 238      ( 30 Nov 2024 10:10 )             27.6     11-30    136  |  102  |  65[H]  ----------------------------<  194[H]  5.0   |  25  |  9.87[H]    Ca    7.9[L]      30 Nov 2024 10:10  Phos  4.3     11-30  Mg     2.0     11-30    TPro  6.3  /  Alb  2.7[L]  /  TBili  1.0  /  DBili  x   /  AST  158[H]  /  ALT  107[H]  /  AlkPhos  269[H]  11-30    PT/INR - ( 29 Nov 2024 06:05 )   PT: 13.4 sec;   INR: 1.15 ratio         PTT - ( 29 Nov 2024 06:05 )  PTT:31.6 sec  Urinalysis Basic - ( 30 Nov 2024 10:10 )    Color: x / Appearance: x / SG: x / pH: x  Gluc: 194 mg/dL / Ketone: x  / Bili: x / Urobili: x   Blood: x / Protein: x / Nitrite: x   Leuk Esterase: x / RBC: x / WBC x   Sq Epi: x / Non Sq Epi: x / Bacteria: x      Magnesium: 2.0 mg/dL (11-30 @ 10:10)  Phosphorus: 4.3 mg/dL (11-30 @ 10:10)    Urine studies    PTH and Vit D:

## 2024-11-30 NOTE — PROGRESS NOTE ADULT - ASSESSMENT
1. ESRD on HD (M/W/F)  -HD today with UF 1.0kg only  and HD orders were written and discussed with dialysis nurse.   -Hemodialysis Renal Diet and Fluid restriction to 1L/day  -Adjust meds to eGFR and avoid IV Gadolinium contrast,NSAIDs, and phosphate enema.  -Monitor Electrolytes daily.  2. HTN:   -bp is acceptable at present.  -continue bp meds  -titrate bp meds to keep sbp >110 and < 130  3. Anemia of ESRD:  -start epogen 4000 tiw  -F/u CBC daily  -transfuse if HB < 7.0.  4. Mineral Bone Disease:  -continue phoslo tid.   -monitor phos  5. Abd pain possible cholecystitis  -s/p paracentesis 4L on 11/19.   -s/p Lap darryl on 11/29.   -plan as per Surgery.

## 2024-11-30 NOTE — PROGRESS NOTE ADULT - SUBJECTIVE AND OBJECTIVE BOX
[   ] ICU                                          [   ] CCU                                      [ X  ] Medical Floor    Patient is a 54 year old male with abdominal pain, diarrhea and vomiting. GI consulted to evaluate.         Patient is a 54 year old male with past medical history significant for HTN, DM, Hyperlipidemia, ESRD on HD, CAD, CHF recently treated for ascites with SBP presented to the emergency room with 2 days history of watery, non bloody diarrhea associated with intermittent diffuse 8/10 intensity non radiating crampy abdominal pain and intractable non bloody vomiting. Patient reported >10 episodes of diarrhea and >5 episodes of vomiting. Patient denies hematemesis, hematochezia, melena, fever, chills, chest pain, SOOB, cough, hematuria, dysuria or recent traveling.     Patient appears comfortable. No new complaints reported, No abdominal pain, N/V, hematemesis, hematochezia, melena, fever, chills, chest pain, SOB, cough or diarrhea reported.        PAST MEDICAL HISTORY    Type 2 diabetes mellitus    Hypertension    HLD (hyperlipidemia)    ESRD on HD    Bone spur    Anemia    Injury of right wrist         PAST SURGICAL HISTORY    Arthroscopy of right shoulder    Vascular access device    Right wrist surgery    AV fistula    Ventral hernia repair        Allergies    No Known Allergies    Intolerances  None          SOCIAL HISTORY  Advanced Directives:       [ X ] Full Code       [  ] DNR  Marital Status:         [  ] M      [  X] S      [  ] D       [  ] W  Children:       [ X ] Yes      [  ] No  Occupation:        [  ] Employed       [ X ] Unemployed       [  ] Retired  Diet:       [ X ] Regular       [  ] PEG feeding          [  ] NG tube feeding  Drug Use:           [ X ] Patient denied          [  ] Yes  Alcohol:           [ X ] No             [  ] Yes (socially)         [  ] Yes (chronic)  Tobacco:           [  ] Yes           [ X ] No        FAMILY HISTORY  [ X ] Heart Disease            [ X ] Diabetes             [ X ] HTN             [  ] Colon Cancer             [  ] Stomach Cancer              [  ] Pancreatic Cancer        VITALS   Vital Signs Last 24 Hrs  T(C): 36.5 (11-30-24 @ 13:18), Max: 37 (11-29-24 @ 14:18)  T(F): 97.7 (11-30-24 @ 13:18), Max: 98.6 (11-29-24 @ 14:18)  HR: 61 (11-30-24 @ 13:18) (57 - 67)  BP: 137/68 (11-30-24 @ 13:18) (131/65 - 176/80)  BP(mean): 98 (11-30-24 @ 08:15) (83 - 112)  RR: 17 (11-30-24 @ 13:18) (12 - 19)  SpO2: 98% (11-30-24 @ 13:18) (93% - 99%)      MEDICATIONS  (STANDING):  carvedilol 12.5 milliGRAM(s) Oral every 12 hours  cefTRIAXone   IVPB 1000 milliGRAM(s) IV Intermittent every 24 hours  chlorhexidine 2% Cloths 1 Application(s) Topical daily  enoxaparin Injectable 30 milliGRAM(s) SubCutaneous every 24 hours  epoetin ignacia-epbx (RETACRIT) Injectable 4000 Unit(s) IV Push <User Schedule>  erythromycin   Ointment 1 Application(s) Left EYE four times a day  lisinopril 40 milliGRAM(s) Oral daily  NIFEdipine XL 90 milliGRAM(s) Oral <User Schedule>  pantoprazole    Tablet 40 milliGRAM(s) Oral before breakfast  traZODone 100 milliGRAM(s) Oral at bedtime    MEDICATIONS  (PRN):  acetaminophen     Tablet .. 650 milliGRAM(s) Oral every 6 hours PRN Temp greater or equal to 38C (100.4F), Mild Pain (1 - 3)  HYDROmorphone  Injectable 0.5 milliGRAM(s) IV Push every 4 hours PRN Severe Pain (7 - 10)  ondansetron Injectable 4 milliGRAM(s) IV Push every 8 hours PRN Nausea and/or Vomiting                            9.3    13.86 )-----------( 238      ( 30 Nov 2024 10:10 )             27.6       11-30    136  |  102  |  65[H]  ----------------------------<  194[H]  5.0   |  25  |  9.87[H]    Ca    7.9[L]      30 Nov 2024 10:10  Phos  4.3     11-30  Mg     2.0     11-30    TPro  6.3  /  Alb  2.7[L]  /  TBili  1.0  /  DBili  x   /  AST  158[H]  /  ALT  107[H]  /  AlkPhos  269[H]  11-30      PT/INR - ( 29 Nov 2024 06:05 )   PT: 13.4 sec;   INR: 1.15 ratio         PTT - ( 29 Nov 2024 06:05 )  PTT:31.6 sec

## 2024-11-30 NOTE — PROGRESS NOTE ADULT - ASSESSMENT
#Nausea/vomiting (resolved)  #Abdominal pain (improved)  #acute cholecystitis s/p cholecysectomy 11/29/2024  #Biliary obstruction  #Ascites  #HTN  #HLD  #CAD s/p PCI  #HFrEF not in acute exacerbation  #ESRD on HD  #DM  #Preoperative clearance   #Hordeolum    -c/w zofran prn for nausea   -POD#1 cholecysectomy with drain in place  -c/w ceftriaxone 1g q24h   -GI recs appreciated  -will likely need therapeutic paracentesis prior to discharge- informed by surgery- reportedly 3.5L of ascitic fluids taken out. Will replace albumin  -holding plavix and asa for now, should be resumed when cleared by surgery  -can c/w home lisinopril 40mg qd, coreg 12.5mg BID, nifedipine 90mg on non HD days   Normally on HD MWF, on a TTS schedule at this moment  -monitor FS/ISS  Left eye lesion consistent with hordeolum- treat with tetracaine for pain, stop after 3 doses, erythromycin eye ointment, warm compress and massage .improved today

## 2024-11-30 NOTE — PROGRESS NOTE ADULT - SUBJECTIVE AND OBJECTIVE BOX
S/p robotic assisted laparoscopic cholecystectomy POD#1  Patient seen and examined at bedside  Complaining of abdominal pain and left eye pain  Denies nausea/ vomiting.   Tolerating diet.    Vital Signs Last 24 Hrs  T(F): 97.8 (11-30-24 @ 08:15), Max: 98.6 (11-29-24 @ 14:18)  HR: 58 (11-30-24 @ 08:15)  BP: 150/72 (11-30-24 @ 08:15)  RR: 18 (11-30-24 @ 08:15)  SpO2: 97% (11-30-24 @ 08:15)  POCT Blood Glucose.: 169 mg/dL (30 Nov 2024 07:40)    GENERAL: Alert, NAD  CHEST/LUNG: respirations nonlabored  ABDOMEN: port sites c/d/i, PITA in place with serosanguineous output: 560ml/24hours  EXTREMITIES:  no calf tenderness, No edema    I&O's Detail    29 Nov 2024 07:01  -  30 Nov 2024 07:00  --------------------------------------------------------  IN:    Lactated Ringers Bolus: 200 mL  Total IN: 200 mL    OUT:    Blood Loss (mL): 200 mL    Bulb (mL): 560 mL    Voided (mL): 650 mL  Total OUT: 1410 mL    Total NET: -1210 mL    LABS:                        9.9    8.85  )-----------( 233      ( 29 Nov 2024 06:05 )             28.4     11-29    141  |  107  |  53[H]  ----------------------------<  140[H]  4.4   |  28  |  8.37[H]    Ca    8.3[L]      29 Nov 2024 06:05  Phos  3.9     11-29  Mg     2.1     11-29    TPro  6.2  /  Alb  2.5[L]  /  TBili  1.7[H]  /  DBili  x   /  AST  256[H]  /  ALT  214[H]  /  AlkPhos  287[H]  11-29    PT/INR - ( 29 Nov 2024 06:05 )   PT: 13.4 sec;   INR: 1.15 ratio      PTT - ( 29 Nov 2024 06:05 )  PTT:31.6 sec

## 2024-11-30 NOTE — PROGRESS NOTE ADULT - SUBJECTIVE AND OBJECTIVE BOX
INTERVAL HPI/OVERNIGHT EVENTS:   Seen in HD, he reports improvement of his left eye pain. He reports severe pain in the abdomen when he moves but at rest is controlled. He has a drain in his abdomen he has been draining multiple times.      REVIEW OF SYSTEMS:negative except per hpi    Vital Signs Last 24 Hrs  T(C): 36.6 (30 Nov 2024 14:37), Max: 37 (29 Nov 2024 15:03)  T(F): 97.9 (30 Nov 2024 14:37), Max: 98.6 (29 Nov 2024 15:03)  HR: 63 (30 Nov 2024 14:37) (57 - 67)  BP: 129/66 (30 Nov 2024 14:37) (129/66 - 176/80)  BP(mean): 87 (30 Nov 2024 14:37) (87 - 112)  RR: 18 (30 Nov 2024 14:37) (12 - 19)  SpO2: 96% (30 Nov 2024 14:37) (94% - 99%)    Parameters below as of 30 Nov 2024 14:37  Patient On (Oxygen Delivery Method): room air        PHYSICAL EXAMINATION:  MIddle aged male, dialysis, mild distress, +abdominal tenderness, has a right sided PITA drain.                 9.3    13.86 )-----------( 238      ( 30 Nov 2024 10:10 )             27.6     11-30    136  |  102  |  65[H]  ----------------------------<  194[H]  5.0   |  25  |  9.87[H]    Ca    7.9[L]      30 Nov 2024 10:10  Phos  4.3     11-30  Mg     2.0     11-30    TPro  6.3  /  Alb  2.7[L]  /  TBili  1.0  /  DBili  x   /  AST  158[H]  /  ALT  107[H]  /  AlkPhos  269[H]  11-30    LIVER FUNCTIONS - ( 30 Nov 2024 10:10 )  Alb: 2.7 g/dL / Pro: 6.3 g/dL / ALK PHOS: 269 U/L / ALT: 107 U/L DA / AST: 158 U/L / GGT: x               PT/INR - ( 29 Nov 2024 06:05 )   PT: 13.4 sec;   INR: 1.15 ratio         PTT - ( 29 Nov 2024 06:05 )  PTT:31.6 sec    CAPILLARY BLOOD GLUCOSE      RADIOLOGY & ADDITIONAL TESTS:

## 2024-11-30 NOTE — PROGRESS NOTE ADULT - ASSESSMENT
54 year old male S/p robotic assisted laparoscopic cholecystectomy POD#1    - continue diet as tolerated  - pain control   - eye drops   - monitor PITA output  - discussed with Dr. Levine

## 2024-11-30 NOTE — PROGRESS NOTE ADULT - ASSESSMENT
54M with PMHx of ESRD on HD via LUE AVF (M/W/F), HFrEF (10/24 EF 45%), CAD s/p PCI, recurrent ascites (gets 10-15L removed every 5-6 weeks), HTN, DM (not on meds), HLD who presented to the hospital for intractable vomiting and diarrhea. T 97.9F, HR 66, /70, O2 99% in RA.  CT A/P showed nonspecific gallbladder wall edema/pericholecystic fluid may be reactive. Admitted for abdominal pain and intractable diarrhea.          # Thickening of wall of gallbladder with pericholecystic fluid.   ·  Plan: CT A/P: Nonspecific gallbladder wall edema/pericholecystic fluid may be reactive.  HIDA scan: Findings compatible with biliary obstruction. Acute cholecystitis cannot be ruled out   s/p  Lap Darryl 11/29  surgery f/u    # Ascites.   ·  Plan: Hx of ascites w/ monthly paracentesis since 1/2021 (~10-15L/month)  Unclear etiology for recurrent ascites. No cirrhosis. Possibly cardiogenic  Last paracentesis on 11/19, fluid w/u showed SBP, SAAG <1.1, fluid protein >5  CT A/P:  Moderate to severe abdominal pelvic ascites, grossly unchanged from 11/18/2024    # ESRD on hemodialysis.     # HFrEF (heart failure with reduced ejection fraction).   ·  Plan: Hx of HFrEF on carvedilol 12.5mg bid, lisinopril 40mg qd  TTE (10/24): LVEF 46%, GIIIDD, Moderate TR, Moderate pulmonary HTN, small pericardial effusion.        #  CAD S/P percutaneous coronary angioplasty.   ·  Plan: Hx of 3 vessel CAD s/p PCI to LAD-mid, D1, LCx (SALLIE) on 4/24 on aspirin 81mg qd and plavix 75mg qd   Patient has been on DAPT for > 6 months  Can hold Plavix prior to Lap darryl

## 2024-11-30 NOTE — PROGRESS NOTE ADULT - ASSESSMENT
1. Abdominal pain  2. Transaminitis improving  3. Cholecystitis  4. Diarrhea  5. Ascites  6. SBP (subacute bacterial peritonitis)  7. Anemia  8. No evidence of acute GI bleeding  9. S/o cholecystectomy    Suggestions:    1. Continue antibiotics  2. Diet as per surgery  3. Follow up LFT's  4. Monitor electrolytes  5. Surgical follow up  6. Monitor H/H  7. Transfuse PRBC as needed  8. Therapeutic paracentesis as needed  9. Protonix 40mg daily  10. Avoid NSAID  111. DVT prophylaxis

## 2024-11-30 NOTE — PROGRESS NOTE ADULT - SUBJECTIVE AND OBJECTIVE BOX
PATIENT SEEN AND EXAMINED BY JEAN PAUL GABRIEL M.D. ON :- 11/30/24  DATE OF SERVICE:     11/30/24        Interim events noted,Labs ,Radiological studies and Cardiology tests reviewed .    Patient is a 54y old  Male who presents with a chief complaint of Vomiting and diarrhea (30 Nov 2024 14:40)      HPI:  Patient is a 54M with PMHx of ESRD on HD via LUE AVF (M/W/F), HFrEF (10/24 EF 45%), CAD s/p PCI, recurrent ascites (gets 10-15L removed every 5-6 weeks), HTN, DM (not on meds), HLD who presented to the hospital for intractable vomiting and diarrhea. Patient was admitted 11/18 - 11/25 for SBP. Patient reports took his cefuroxime as prescribed yesterday. He ate hot chicken wings and Pina colada shake for dinner and 2h later he started having NBNB vomiting and non-bloody watery diarrhea. He reports being up all night due to diarrhea. He reports >10 episodes of diarrhea and >5 episodes of vomiting. He reports diffuse abdominal pain but more focally in the epigastric area. He denies chest pain, palpitation, SOB, fever, chills, cough arm or leg weakness.      (27 Nov 2024 16:13)      PAST MEDICAL & SURGICAL HISTORY:  Type 2 diabetes mellitus      Hypertension      End stage renal disease  started HD 2/2019 T, Th, Sat via right chest permacath      Bone spur  right shoulder- hx of - sx done      Anemia      Injury of right wrist  hx of at age 14      History of hyperkalemia  before HD- K-6.2 - to repeat in am of sx, pt had HD today      S/P arthroscopy of right shoulder  2010      History of vascular access device  right chest permacath - 2/2019      H/O right wrist surgery  tendons repair - at age 14      AV fistula      H/O ventral hernia repair          PREVIOUS DIAGNOSTIC TESTING:      ECHO  FINDINGS:    STRESS  FINDINGS:    CATHETERIZATION  FINDINGS:    MEDICATIONS  (STANDING):  carvedilol 12.5 milliGRAM(s) Oral every 12 hours  cefTRIAXone   IVPB 1000 milliGRAM(s) IV Intermittent every 24 hours  chlorhexidine 2% Cloths 1 Application(s) Topical daily  enoxaparin Injectable 30 milliGRAM(s) SubCutaneous every 24 hours  epoetin ignacia-epbx (RETACRIT) Injectable 4000 Unit(s) IV Push <User Schedule>  erythromycin   Ointment 1 Application(s) Left EYE four times a day  lisinopril 40 milliGRAM(s) Oral daily  NIFEdipine XL 90 milliGRAM(s) Oral <User Schedule>  pantoprazole    Tablet 40 milliGRAM(s) Oral before breakfast  traZODone 100 milliGRAM(s) Oral at bedtime    MEDICATIONS  (PRN):  acetaminophen     Tablet .. 650 milliGRAM(s) Oral every 6 hours PRN Temp greater or equal to 38C (100.4F), Mild Pain (1 - 3)  HYDROmorphone  Injectable 0.5 milliGRAM(s) IV Push every 4 hours PRN Severe Pain (7 - 10)  ondansetron Injectable 4 milliGRAM(s) IV Push every 8 hours PRN Nausea and/or Vomiting      FAMILY HISTORY:  FH: hypertension  mother, father- alive    FH: type 2 diabetes  mother, father- alive        SOCIAL HISTORY:    CIGARETTES:    ALCOHOL:    REVIEW OF SYSTEMS:  CONSTITUTIONAL: No fever, weight loss, or fatigue  EYES: No eye pain, visual disturbances, or discharge  ENMT:  No difficulty hearing, tinnitus, vertigo; No sinus or throat pain  NECK: No pain or stiffness  RESPIRATORY: No cough, wheezing, chills or hemoptysis; No shortness of breath  CARDIOVASCULAR: No chest pain, palpitations, dizziness, or leg swelling  GASTROINTESTINAL: No abdominal or epigastric pain. No nausea, vomiting, or hematemesis; No diarrhea or constipation. No melena or hematochezia.  GENITOURINARY: No dysuria, frequency, hematuria, or incontinence  NEUROLOGICAL: No headaches, memory loss, loss of strength, numbness, or tremors  SKIN: No itching, burning, rashes, or lesions   LYMPH NODES: No enlarged glands  ENDOCRINE: No heat or cold intolerance; No hair loss  MUSCULOSKELETAL: No joint pain or swelling; No muscle, back, or extremity pain  PSYCHIATRIC: No depression, anxiety, mood swings, or difficulty sleeping  HEME/LYMPH: No easy bruising, or bleeding gums  ALLERY AND IMMUNOLOGIC: No hives or eczema    Vital Signs Last 24 Hrs  T(C): 36.7 (30 Nov 2024 21:43), Max: 36.9 (30 Nov 2024 00:18)  T(F): 98.1 (30 Nov 2024 21:43), Max: 98.5 (30 Nov 2024 00:18)  HR: 63 (30 Nov 2024 21:43) (57 - 63)  BP: 120/50 (30 Nov 2024 21:43) (113/57 - 176/80)  BP(mean): 73 (30 Nov 2024 21:43) (73 - 112)  RR: 18 (30 Nov 2024 21:43) (17 - 18)  SpO2: 98% (30 Nov 2024 21:43) (96% - 98%)    Parameters below as of 30 Nov 2024 21:43  Patient On (Oxygen Delivery Method): room air          PHYSICAL EXAM:  GENERAL: NAD, well-groomed, well-developed  HEAD:  Atraumatic, Normocephalic  EYES: EOMI, PERRLA, conjunctiva and sclera clear  ENMT: No tonsillar erythema, exudates, or enlargement; Moist mucous membranes, Good dentition, No lesions  NECK: Supple, No JVD, Normal thyroid  NERVOUS SYSTEM:  Alert & Oriented X3, Good concentration; Motor Strength 5/5 B/L upper and lower extremities; DTRs 2+ intact and symmetric  CHEST/LUNG: Clear to percussion bilaterally; No rales, rhonchi, wheezing, or rubs  HEART: Regular rate and rhythm; No murmurs, rubs, or gallops  ABDOMEN: Soft, Nontender, Nondistended; Bowel sounds present  EXTREMITIES:  2+ Peripheral Pulses, No clubbing, cyanosis, or edema  LYMPH: No lymphadenopathy noted  SKIN: No rashes or lesions      INTERPRETATION OF TELEMETRY:    ECG:    CHADSVASC:     LABS:                        9.3    13.86 )-----------( 238      ( 30 Nov 2024 10:10 )             27.6     11-30    136  |  102  |  65[H]  ----------------------------<  194[H]  5.0   |  25  |  9.87[H]    Ca    7.9[L]      30 Nov 2024 10:10  Phos  4.3     11-30  Mg     2.0     11-30    TPro  6.3  /  Alb  2.7[L]  /  TBili  1.0  /  DBili  x   /  AST  158[H]  /  ALT  107[H]  /  AlkPhos  269[H]  11-30        PT/INR - ( 29 Nov 2024 06:05 )   PT: 13.4 sec;   INR: 1.15 ratio         PTT - ( 29 Nov 2024 06:05 )  PTT:31.6 sec  Urinalysis Basic - ( 30 Nov 2024 10:10 )    Color: x / Appearance: x / SG: x / pH: x  Gluc: 194 mg/dL / Ketone: x  / Bili: x / Urobili: x   Blood: x / Protein: x / Nitrite: x   Leuk Esterase: x / RBC: x / WBC x   Sq Epi: x / Non Sq Epi: x / Bacteria: x      Lipid Panel:   I&O's Summary    29 Nov 2024 07:01  -  30 Nov 2024 07:00  --------------------------------------------------------  IN: 200 mL / OUT: 1410 mL / NET: -1210 mL    30 Nov 2024 07:01  -  30 Nov 2024 23:38  --------------------------------------------------------  IN: 600 mL / OUT: 1810 mL / NET: -1210 mL        RADIOLOGY & ADDITIONAL STUDIES:    < from: Transthoracic Echocardiogram (11.04.23 @ 16:00) >  CONCLUSIONS:  1. Trace mitral regurgitation.  2.  Trace aortic regurgitation.  3. Moderately dilated left atrium.  LA volume index = 46  cc/m2.  4. Severe left ventricular enlargement.  5. Moderate global left ventricular systolic dysfunction.  6. Grade III-IV diastolic dysfunction (severe).  7. Right ventricle not well visualized.    Probably normal  right ventricular size and systolic function.  8. Trace pericardial effusion.    ------------------------------------------------------------------------  Confirmed on  11/5/2023 - 15:50:34 by Deep Benoit MD  ------------------------------------------------------------------------    < end of copied text >

## 2024-12-01 LAB
ALBUMIN SERPL ELPH-MCNC: 2.4 G/DL — LOW (ref 3.5–5)
ALP SERPL-CCNC: 239 U/L — HIGH (ref 40–120)
ALT FLD-CCNC: 46 U/L DA — SIGNIFICANT CHANGE UP (ref 10–60)
ANION GAP SERPL CALC-SCNC: 6 MMOL/L — SIGNIFICANT CHANGE UP (ref 5–17)
AST SERPL-CCNC: 79 U/L — HIGH (ref 10–40)
BILIRUB SERPL-MCNC: 0.7 MG/DL — SIGNIFICANT CHANGE UP (ref 0.2–1.2)
BUN SERPL-MCNC: 42 MG/DL — HIGH (ref 7–18)
CALCIUM SERPL-MCNC: 8 MG/DL — LOW (ref 8.4–10.5)
CHLORIDE SERPL-SCNC: 103 MMOL/L — SIGNIFICANT CHANGE UP (ref 96–108)
CO2 SERPL-SCNC: 28 MMOL/L — SIGNIFICANT CHANGE UP (ref 22–31)
CREAT SERPL-MCNC: 7.37 MG/DL — HIGH (ref 0.5–1.3)
EGFR: 8 ML/MIN/1.73M2 — LOW
GLUCOSE BLDC GLUCOMTR-MCNC: 147 MG/DL — HIGH (ref 70–99)
GLUCOSE BLDC GLUCOMTR-MCNC: 154 MG/DL — HIGH (ref 70–99)
GLUCOSE BLDC GLUCOMTR-MCNC: 185 MG/DL — HIGH (ref 70–99)
GLUCOSE SERPL-MCNC: 121 MG/DL — HIGH (ref 70–99)
HCT VFR BLD CALC: 27.7 % — LOW (ref 39–50)
HGB BLD-MCNC: 9.4 G/DL — LOW (ref 13–17)
MAGNESIUM SERPL-MCNC: 1.8 MG/DL — SIGNIFICANT CHANGE UP (ref 1.6–2.6)
MCHC RBC-ENTMCNC: 32.3 PG — SIGNIFICANT CHANGE UP (ref 27–34)
MCHC RBC-ENTMCNC: 33.9 G/DL — SIGNIFICANT CHANGE UP (ref 32–36)
MCV RBC AUTO: 95.2 FL — SIGNIFICANT CHANGE UP (ref 80–100)
NRBC # BLD: 0 /100 WBCS — SIGNIFICANT CHANGE UP (ref 0–0)
PHOSPHATE SERPL-MCNC: 4.8 MG/DL — HIGH (ref 2.5–4.5)
PLATELET # BLD AUTO: 265 K/UL — SIGNIFICANT CHANGE UP (ref 150–400)
POTASSIUM SERPL-MCNC: 4.7 MMOL/L — SIGNIFICANT CHANGE UP (ref 3.5–5.3)
POTASSIUM SERPL-SCNC: 4.7 MMOL/L — SIGNIFICANT CHANGE UP (ref 3.5–5.3)
PROT SERPL-MCNC: 6.4 G/DL — SIGNIFICANT CHANGE UP (ref 6–8.3)
RBC # BLD: 2.91 M/UL — LOW (ref 4.2–5.8)
RBC # FLD: 15.6 % — HIGH (ref 10.3–14.5)
SODIUM SERPL-SCNC: 137 MMOL/L — SIGNIFICANT CHANGE UP (ref 135–145)
WBC # BLD: 9.73 K/UL — SIGNIFICANT CHANGE UP (ref 3.8–10.5)
WBC # FLD AUTO: 9.73 K/UL — SIGNIFICANT CHANGE UP (ref 3.8–10.5)

## 2024-12-01 PROCEDURE — 99233 SBSQ HOSP IP/OBS HIGH 50: CPT

## 2024-12-01 RX ORDER — OXYCODONE AND ACETAMINOPHEN 5; 325 MG/1; MG/1
1 TABLET ORAL EVERY 4 HOURS
Refills: 0 | Status: DISCONTINUED | OUTPATIENT
Start: 2024-12-01 | End: 2024-12-02

## 2024-12-01 RX ADMIN — HYDROMORPHONE HYDROCHLORIDE 0.5 MILLIGRAM(S): 2 TABLET ORAL at 17:42

## 2024-12-01 RX ADMIN — HYDROMORPHONE HYDROCHLORIDE 0.5 MILLIGRAM(S): 2 TABLET ORAL at 21:06

## 2024-12-01 RX ADMIN — Medication 1 APPLICATION(S): at 12:16

## 2024-12-01 RX ADMIN — TRAZODONE HYDROCHLORIDE 100 MILLIGRAM(S): 150 TABLET ORAL at 21:05

## 2024-12-01 RX ADMIN — Medication 100 MILLIGRAM(S): at 17:43

## 2024-12-01 RX ADMIN — CARVEDILOL 12.5 MILLIGRAM(S): 25 TABLET, FILM COATED ORAL at 17:44

## 2024-12-01 RX ADMIN — LISINOPRIL 40 MILLIGRAM(S): 20 TABLET ORAL at 06:18

## 2024-12-01 RX ADMIN — Medication 1 APPLICATION(S): at 06:18

## 2024-12-01 RX ADMIN — Medication 90 MILLIGRAM(S): at 06:18

## 2024-12-01 RX ADMIN — HYDROMORPHONE HYDROCHLORIDE 0.5 MILLIGRAM(S): 2 TABLET ORAL at 01:19

## 2024-12-01 RX ADMIN — CARVEDILOL 12.5 MILLIGRAM(S): 25 TABLET, FILM COATED ORAL at 06:18

## 2024-12-01 RX ADMIN — HYDROMORPHONE HYDROCHLORIDE 0.5 MILLIGRAM(S): 2 TABLET ORAL at 00:23

## 2024-12-01 RX ADMIN — HYDROMORPHONE HYDROCHLORIDE 0.5 MILLIGRAM(S): 2 TABLET ORAL at 22:03

## 2024-12-01 RX ADMIN — Medication 1 APPLICATION(S): at 17:47

## 2024-12-01 RX ADMIN — Medication 5 MILLIGRAM(S): at 21:05

## 2024-12-01 RX ADMIN — CHLORHEXIDINE GLUCONATE 1 APPLICATION(S): 1.2 RINSE ORAL at 12:17

## 2024-12-01 RX ADMIN — HYDROMORPHONE HYDROCHLORIDE 0.5 MILLIGRAM(S): 2 TABLET ORAL at 18:40

## 2024-12-01 NOTE — PROGRESS NOTE ADULT - ASSESSMENT
1. ESRD on HD (M/W/F)  -s/p HD yesterday with UF 1.0kg. Plan for HD tomorrow as per his schedule and HD orders were written and discussed with dialysis nurse.   -Hemodialysis Renal Diet and Fluid restriction to 1L/day  -Adjust meds to eGFR and avoid IV Gadolinium contrast,NSAIDs, and phosphate enema.  -Monitor Electrolytes daily.  2. HTN:   -bp is acceptable at present.  -continue bp meds  -titrate bp meds to keep sbp >110 and < 130  3. Anemia of ESRD:  -on epogen 4000 tiw  -F/u CBC daily  -transfuse if HB < 7.0.  4. Mineral Bone Disease:  -continue phoslo tid.   -monitor phos  5. Abd pain possible cholecystitis  -s/p paracentesis 4L on 11/19.   -s/p Lap darryl on 11/29.   -plan as per Surgery.

## 2024-12-01 NOTE — PROGRESS NOTE ADULT - SUBJECTIVE AND OBJECTIVE BOX
NEPHROLOGY MEDICAL CARE, [facility] - Dr. Domenic Griffiths/ Dr. Bishnu Amador/ Dr. Fili Smiley/ Dr. Naomie Crowder    Date of Service: 12-01-24    Patient was seen and examined at bedside.    CC: patient is okay and NAD    Vital Signs Last 24 Hrs  T(C): 36.8 (01 Dec 2024 12:15), Max: 36.8 (01 Dec 2024 06:15)  T(F): 98.3 (01 Dec 2024 12:15), Max: 98.3 (01 Dec 2024 06:15)  HR: 64 (01 Dec 2024 12:15) (62 - 64)  BP: 156/65 (01 Dec 2024 12:15) (113/57 - 156/65)  BP(mean): 94 (01 Dec 2024 06:15) (73 - 94)  RR: 18 (01 Dec 2024 12:15) (17 - 18)  SpO2: 96% (01 Dec 2024 12:15) (96% - 98%)    Parameters below as of 01 Dec 2024 12:15  Patient On (Oxygen Delivery Method): room air        11-30 @ 07:01  -  12-01 @ 07:00  --------------------------------------------------------  IN: 600 mL / OUT: 1885 mL / NET: -1285 mL        PHYSICAL EXAM:  General: No acute respiratory distress.  Eyes: conjunctiva and sclera clear  ENMT: Atraumatic, Normocephalic  Respiratory:   Bilaterally clear lungs; No rales, rhonchi, wheezing  Cardiovascular: S1S2+; no m/r/g  Gastrointestinal: Soft, mild tenderness, Nondistended; Bowel sounds present  Neuro:  Awake, Alert & Oriented X3  Ext:  No edema, No Cyanosis  Skin: No rashes  Dialysis Access::   Left upper arm AVF thrill/bruit+     MEDICATIONS:  MEDICATIONS  (STANDING):  bisacodyl 5 milliGRAM(s) Oral at bedtime  carvedilol 12.5 milliGRAM(s) Oral every 12 hours  cefTRIAXone   IVPB 1000 milliGRAM(s) IV Intermittent every 24 hours  chlorhexidine 2% Cloths 1 Application(s) Topical daily  enoxaparin Injectable 30 milliGRAM(s) SubCutaneous every 24 hours  epoetin ignacia-epbx (RETACRIT) Injectable 4000 Unit(s) IV Push <User Schedule>  erythromycin   Ointment 1 Application(s) Left EYE four times a day  lisinopril 40 milliGRAM(s) Oral daily  NIFEdipine XL 90 milliGRAM(s) Oral <User Schedule>  pantoprazole    Tablet 40 milliGRAM(s) Oral before breakfast  traZODone 100 milliGRAM(s) Oral at bedtime    MEDICATIONS  (PRN):  acetaminophen     Tablet .. 650 milliGRAM(s) Oral every 6 hours PRN Temp greater or equal to 38C (100.4F), Mild Pain (1 - 3)  HYDROmorphone  Injectable 0.5 milliGRAM(s) IV Push every 4 hours PRN Severe Pain (7 - 10)  ondansetron Injectable 4 milliGRAM(s) IV Push every 8 hours PRN Nausea and/or Vomiting  oxycodone    5 mG/acetaminophen 325 mG 1 Tablet(s) Oral every 4 hours PRN Moderate Pain (4 - 6)          LABS:                        9.4    9.73  )-----------( 265      ( 01 Dec 2024 06:23 )             27.7     12-01    137  |  103  |  42[H]  ----------------------------<  121[H]  4.7   |  28  |  7.37[H]    Ca    8.0[L]      01 Dec 2024 06:23  Phos  4.8     12-01  Mg     1.8     12-01    TPro  6.4  /  Alb  2.4[L]  /  TBili  0.7  /  DBili  x   /  AST  79[H]  /  ALT  46  /  AlkPhos  239[H]  12-01      Urinalysis Basic - ( 01 Dec 2024 06:23 )    Color: x / Appearance: x / SG: x / pH: x  Gluc: 121 mg/dL / Ketone: x  / Bili: x / Urobili: x   Blood: x / Protein: x / Nitrite: x   Leuk Esterase: x / RBC: x / WBC x   Sq Epi: x / Non Sq Epi: x / Bacteria: x      Magnesium: 1.8 mg/dL (12-01 @ 06:23)  Phosphorus: 4.8 mg/dL (12-01 @ 06:23)    Urine studies    PTH and Vit D:

## 2024-12-01 NOTE — PROGRESS NOTE ADULT - SUBJECTIVE AND OBJECTIVE BOX
INTERVAL HPI/OVERNIGHT EVENTS:   Seen this AM, reports improving abdominal pain as he is getting used to it. Has been declining finger sticks and vitals per floor staff.  Denies fever or chills. making gas. TOlertating food.  No sob no chest pain    REVIEW OF SYSTEMS:  Negative except per hpi    Vital Signs Last 24 Hrs  T(C): 36.8 (01 Dec 2024 06:15), Max: 36.8 (01 Dec 2024 06:15)  T(F): 98.3 (01 Dec 2024 06:15), Max: 98.3 (01 Dec 2024 06:15)  HR: 64 (01 Dec 2024 06:15) (61 - 64)  BP: 143/69 (01 Dec 2024 06:15) (113/57 - 143/69)  BP(mean): 94 (01 Dec 2024 06:15) (73 - 94)  RR: 18 (01 Dec 2024 06:15) (17 - 18)  SpO2: 96% (01 Dec 2024 06:15) (96% - 98%)    Parameters below as of 01 Dec 2024 06:15  Patient On (Oxygen Delivery Method): room air        PHYSICAL EXAMINATION:  middle aged male well nourished no distress, sitting up in bed, completed breakfast. abdomen mildly firm bowel sounds present, nontender, has rhett drain in palce. no lower ext edema                        9.4    9.73  )-----------( 265      ( 01 Dec 2024 06:23 )             27.7     12-01    137  |  103  |  42[H]  ----------------------------<  121[H]  4.7   |  28  |  7.37[H]    Ca    8.0[L]      01 Dec 2024 06:23  Phos  4.8     12-01  Mg     1.8     12-01    TPro  6.4  /  Alb  2.4[L]  /  TBili  0.7  /  DBili  x   /  AST  79[H]  /  ALT  46  /  AlkPhos  239[H]  12-01    LIVER FUNCTIONS - ( 01 Dec 2024 06:23 )  Alb: 2.4 g/dL / Pro: 6.4 g/dL / ALK PHOS: 239 U/L / ALT: 46 U/L DA / AST: 79 U/L / GGT: x                   CAPILLARY BLOOD GLUCOSE      RADIOLOGY & ADDITIONAL TESTS:

## 2024-12-01 NOTE — PROGRESS NOTE ADULT - SUBJECTIVE AND OBJECTIVE BOX
INTERVAL HPI/OVERNIGHT EVENTS:  Pt seen/examined at bedside. No acute complaints. Tolerating diet without n/v.     Vital Signs Last 24 Hrs  T(C): 36.8 (01 Dec 2024 06:15), Max: 36.8 (01 Dec 2024 06:15)  T(F): 98.3 (01 Dec 2024 06:15), Max: 98.3 (01 Dec 2024 06:15)  HR: 64 (01 Dec 2024 06:15) (61 - 64)  BP: 143/69 (01 Dec 2024 06:15) (113/57 - 143/69)  BP(mean): 94 (01 Dec 2024 06:15) (73 - 94)  RR: 18 (01 Dec 2024 06:15) (17 - 18)  SpO2: 96% (01 Dec 2024 06:15) (96% - 98%)    Parameters below as of 01 Dec 2024 06:15  Patient On (Oxygen Delivery Method): room air      I&O's Detail    30 Nov 2024 07:01  -  01 Dec 2024 07:00  --------------------------------------------------------  IN:    Other (mL): 600 mL  Total IN: 600 mL    OUT:    Bulb (mL): 285 mL    Other (mL): 1600 mL  Total OUT: 1885 mL    Total NET: -1285 mL          Medications:  bisacodyl 5 milliGRAM(s) Oral at bedtime  cefTRIAXone   IVPB 1000 milliGRAM(s) IV Intermittent every 24 hours  pantoprazole    Tablet 40 milliGRAM(s) Oral before breakfast      Physical Exam:  General: AAOx3, No acute distress  HEENT: NC/AT, trachea midline  Respiratory: Nonlabored breathing, equal chest rise b/l   Abdomen: soft,  nondistended, nontender, no rebound tenderness, no guarding, no palpable masses; dressings c/d/i      Drains/Tubes:     11-30-24 @ 07:01  -  12-01-24 @ 07:00  --------------------------------------------------------  IN: 600 mL / OUT: 1885 mL / NET: -1285 mL        Labs:                        9.4    9.73  )-----------( 265      ( 01 Dec 2024 06:23 )             27.7     12-01    137  |  103  |  42[H]  ----------------------------<  121[H]  4.7   |  28  |  7.37[H]    Ca    8.0[L]      01 Dec 2024 06:23  Phos  4.8     12-01  Mg     1.8     12-01    TPro  6.4  /  Alb  2.4[L]  /  TBili  0.7  /  DBili  x   /  AST  79[H]  /  ALT  46  /  AlkPhos  239[H]  12-01

## 2024-12-01 NOTE — PROGRESS NOTE ADULT - ASSESSMENT
54 year old male S/p robotic assisted laparoscopic cholecystectomy POD#2    - low fat diet  - pain control prn  - monitor PITA output  - dvt ppx  - ambulate as tolerate/oobtc

## 2024-12-01 NOTE — PROGRESS NOTE ADULT - SUBJECTIVE AND OBJECTIVE BOX
[   ] ICU                                          [   ] CCU                                      [  X ] Medical Floor    Patient is a 54 year old male with abdominal pain, diarrhea and vomiting. GI consulted to evaluate.         Patient is a 54 year old male with past medical history significant for HTN, DM, Hyperlipidemia, ESRD on HD, CAD, CHF recently treated for ascites with SBP presented to the emergency room with 2 days history of watery, non bloody diarrhea associated with intermittent diffuse 8/10 intensity non radiating crampy abdominal pain and intractable non bloody vomiting. Patient reported >10 episodes of diarrhea and >5 episodes of vomiting. Patient denies hematemesis, hematochezia, melena, fever, chills, chest pain, SOOB, cough, hematuria, dysuria or recent traveling.     Patient appears comfortable. No new complaints reported, No abdominal pain, N/V, hematemesis, hematochezia, melena, fever, chills, chest pain, SOB, cough or diarrhea reported.        PAST MEDICAL HISTORY    Type 2 diabetes mellitus    Hypertension    HLD (hyperlipidemia)    ESRD on HD    Bone spur    Anemia    Injury of right wrist         PAST SURGICAL HISTORY    Arthroscopy of right shoulder    Vascular access device    Right wrist surgery    AV fistula    Ventral hernia repair        Allergies    No Known Allergies    Intolerances  None          SOCIAL HISTORY  Advanced Directives:       [ X ] Full Code       [  ] DNR  Marital Status:         [  ] M      [  X] S      [  ] D       [  ] W  Children:       [ X ] Yes      [  ] No  Occupation:        [  ] Employed       [ X ] Unemployed       [  ] Retired  Diet:       [ X ] Regular       [  ] PEG feeding          [  ] NG tube feeding  Drug Use:           [ X ] Patient denied          [  ] Yes  Alcohol:           [ X ] No             [  ] Yes (socially)         [  ] Yes (chronic)  Tobacco:           [  ] Yes           [ X ] No        FAMILY HISTORY  [ X ] Heart Disease            [ X ] Diabetes             [ X ] HTN             [  ] Colon Cancer             [  ] Stomach Cancer              [  ] Pancreatic Cancer        VITALS   Vital Signs Last 24 Hrs  T(C): 36.8 (01 Dec 2024 06:15), Max: 36.8 (01 Dec 2024 06:15)  T(F): 98.3 (01 Dec 2024 06:15), Max: 98.3 (01 Dec 2024 06:15)  HR: 64 (01 Dec 2024 06:15) (61 - 64)  BP: 143/69 (01 Dec 2024 06:15) (113/57 - 143/69)  BP(mean): 94 (01 Dec 2024 06:15) (73 - 94)  RR: 18 (01 Dec 2024 06:15) (17 - 18)  SpO2: 96% (01 Dec 2024 06:15) (96% - 98%)  Parameters below as of 01 Dec 2024 06:15  Patient On (Oxygen Delivery Method): room air        MEDICATIONS  (STANDING):  bisacodyl 5 milliGRAM(s) Oral at bedtime  carvedilol 12.5 milliGRAM(s) Oral every 12 hours  cefTRIAXone   IVPB 1000 milliGRAM(s) IV Intermittent every 24 hours  chlorhexidine 2% Cloths 1 Application(s) Topical daily  enoxaparin Injectable 30 milliGRAM(s) SubCutaneous every 24 hours  epoetin ignacia-epbx (RETACRIT) Injectable 4000 Unit(s) IV Push <User Schedule>  erythromycin   Ointment 1 Application(s) Left EYE four times a day  lisinopril 40 milliGRAM(s) Oral daily  NIFEdipine XL 90 milliGRAM(s) Oral <User Schedule>  pantoprazole    Tablet 40 milliGRAM(s) Oral before breakfast  traZODone 100 milliGRAM(s) Oral at bedtime    MEDICATIONS  (PRN):  acetaminophen     Tablet .. 650 milliGRAM(s) Oral every 6 hours PRN Temp greater or equal to 38C (100.4F), Mild Pain (1 - 3)  HYDROmorphone  Injectable 0.5 milliGRAM(s) IV Push every 4 hours PRN Severe Pain (7 - 10)  ondansetron Injectable 4 milliGRAM(s) IV Push every 8 hours PRN Nausea and/or Vomiting  oxycodone    5 mG/acetaminophen 325 mG 1 Tablet(s) Oral every 4 hours PRN Moderate Pain (4 - 6)                            9.4    9.73  )-----------( 265      ( 01 Dec 2024 06:23 )             27.7       12-01    137  |  103  |  42[H]  ----------------------------<  121[H]  4.7   |  28  |  7.37[H]    Ca    8.0[L]      01 Dec 2024 06:23  Phos  4.8     12-01  Mg     1.8     12-01    TPro  6.4  /  Alb  2.4[L]  /  TBili  0.7  /  DBili  x   /  AST  79[H]  /  ALT  46  /  AlkPhos  239[H]  12-01

## 2024-12-01 NOTE — PROGRESS NOTE ADULT - ASSESSMENT
#Nausea/vomiting (resolved)  #Abdominal pain (improved)  #acute cholecystitis s/p cholecysectomy 11/29/2024  #Biliary obstruction  #Ascites  #HTN  #HLD  #CAD s/p PCI  #HFrEF not in acute exacerbation  #ESRD on HD  #DM  #Preoperative clearance   #Hordeolum    -c/w zofran prn for nausea   -POD#2 cholecysectomy with drain in place  -c/w ceftriaxone 1g q24h- will continue ceftriaxone after cholecysectomy  -GI recs appreciated  -had ascitic fluid drainage during cholecysectomy reported 3.5L.  -holding plavix and asa for now, should be resumed when cleared by surgery  -can c/w home lisinopril 40mg qd, coreg 12.5mg BID, nifedipine 90mg(on non HD days)   Normally on HD MWF, on a TTS schedule at this moment- plan for reinstatement of HD after monday HD  -monitor FS/ISS  Left eye lesion consistent with hordeolum- treat with tetracaine for pain, stop after 3 doses, erythromycin eye ointment, warm compress and massage. improved no further complaints

## 2024-12-01 NOTE — PROGRESS NOTE ADULT - TIME BILLING
- Review of records, telemetry, vital signs and daily labs.   - General and cardiovascular physical examination.  - Generation of cardiovascular treatment plan and completion of note .  - Coordination of care.      Patient was seen and examined by me on 11/30/2024 ,interim events noted,labs and radiology studies reviewed.  Solo Quick MD,FACC.  [address] [facility].  [phone]
- Review of records, telemetry, vital signs and daily labs.   - General and cardiovascular physical examination.  - Generation of cardiovascular treatment plan and completion of note .  - Coordination of care.      Patient was seen and examined by me on 11/29/2024 ,interim events noted,labs and radiology studies reviewed.  Solo Quick MD,FACC.  [id number] [facility].  [phone]
- Review of records, telemetry, vital signs and daily labs.   - General and cardiovascular physical examination.  - Generation of cardiovascular treatment plan and completion of note .  - Coordination of care.      Patient was seen and examined by me on 12/1/2024 ,interim events noted,labs and radiology studies reviewed.  Solo Quick MD,FACC.  [address] [facility].  [phone]
coordination with consultants, interview with patient, coordiantion with HD unit, review of labs and imaging, formulation of plan, medical and medication management, documentation of encounter
Discussion and coordination with consultants, coordination with SW. review of labs and imaging,. formulation of plan, medical and medication management, documentation of encounter
Urgent consultation to pacu for eye pain, discussion with patient's wife, review of labs, review of imaging, review of previous imaging, formulation of plan, medical and medication management, documentation of encounter
Time spent includes direct patient care (interview and examination of patient), discussion with other providers, support staff and/or patient's family members, review of medical records, ordering diagnostic tests and analyzing results, and documentation.

## 2024-12-01 NOTE — PROGRESS NOTE ADULT - SUBJECTIVE AND OBJECTIVE BOX
PATIENT SEEN AND EXAMINED BY JEAN PAUL GABRIEL M.D. ON :- 12/1/24  DATE OF SERVICE:   12/1/24          Interim events noted,Labs ,Radiological studies and Cardiology tests reviewed .    Patient is a 54y old  Male who presents with a chief complaint of Vomiting and diarrhea (01 Dec 2024 14:27)      HPI:  Patient is a 54M with PMHx of ESRD on HD via LUE AVF (M/W/F), HFrEF (10/24 EF 45%), CAD s/p PCI, recurrent ascites (gets 10-15L removed every 5-6 weeks), HTN, DM (not on meds), HLD who presented to the hospital for intractable vomiting and diarrhea. Patient was admitted 11/18 - 11/25 for SBP. Patient reports took his cefuroxime as prescribed yesterday. He ate hot chicken wings and Pina colada shake for dinner and 2h later he started having NBNB vomiting and non-bloody watery diarrhea. He reports being up all night due to diarrhea. He reports >10 episodes of diarrhea and >5 episodes of vomiting. He reports diffuse abdominal pain but more focally in the epigastric area. He denies chest pain, palpitation, SOB, fever, chills, cough arm or leg weakness.      (27 Nov 2024 16:13)      PAST MEDICAL & SURGICAL HISTORY:  Type 2 diabetes mellitus      Hypertension      End stage renal disease  started HD 2/2019 T, Th, Sat via right chest permacath      Bone spur  right shoulder- hx of - sx done      Anemia      Injury of right wrist  hx of at age 14      History of hyperkalemia  before HD- K-6.2 - to repeat in am of sx, pt had HD today      S/P arthroscopy of right shoulder  2010      History of vascular access device  right chest permacath - 2/2019      H/O right wrist surgery  tendons repair - at age 14      AV fistula      H/O ventral hernia repair          PREVIOUS DIAGNOSTIC TESTING:      ECHO  FINDINGS:    STRESS  FINDINGS:    CATHETERIZATION  FINDINGS:    MEDICATIONS  (STANDING):  bisacodyl 5 milliGRAM(s) Oral at bedtime  carvedilol 12.5 milliGRAM(s) Oral every 12 hours  cefTRIAXone   IVPB 1000 milliGRAM(s) IV Intermittent every 24 hours  chlorhexidine 2% Cloths 1 Application(s) Topical daily  enoxaparin Injectable 30 milliGRAM(s) SubCutaneous every 24 hours  epoetin ignacia-epbx (RETACRIT) Injectable 4000 Unit(s) IV Push <User Schedule>  erythromycin   Ointment 1 Application(s) Left EYE four times a day  lisinopril 40 milliGRAM(s) Oral daily  NIFEdipine XL 90 milliGRAM(s) Oral <User Schedule>  pantoprazole    Tablet 40 milliGRAM(s) Oral before breakfast  traZODone 100 milliGRAM(s) Oral at bedtime    MEDICATIONS  (PRN):  acetaminophen     Tablet .. 650 milliGRAM(s) Oral every 6 hours PRN Temp greater or equal to 38C (100.4F), Mild Pain (1 - 3)  HYDROmorphone  Injectable 0.5 milliGRAM(s) IV Push every 4 hours PRN Severe Pain (7 - 10)  ondansetron Injectable 4 milliGRAM(s) IV Push every 8 hours PRN Nausea and/or Vomiting  oxycodone    5 mG/acetaminophen 325 mG 1 Tablet(s) Oral every 4 hours PRN Moderate Pain (4 - 6)      FAMILY HISTORY:  FH: hypertension  mother, father- alive    FH: type 2 diabetes  mother, father- alive        SOCIAL HISTORY:    CIGARETTES:    ALCOHOL:    REVIEW OF SYSTEMS:  CONSTITUTIONAL: No fever, weight loss, or fatigue  EYES: No eye pain, visual disturbances, or discharge  ENMT:  No difficulty hearing, tinnitus, vertigo; No sinus or throat pain  NECK: No pain or stiffness  RESPIRATORY: No cough, wheezing, chills or hemoptysis; No shortness of breath  CARDIOVASCULAR: No chest pain, palpitations, dizziness, or leg swelling  GASTROINTESTINAL: No abdominal or epigastric pain. No nausea, vomiting, or hematemesis; No diarrhea or constipation. No melena or hematochezia.  GENITOURINARY: No dysuria, frequency, hematuria, or incontinence  NEUROLOGICAL: No headaches, memory loss, loss of strength, numbness, or tremors  SKIN: No itching, burning, rashes, or lesions   LYMPH NODES: No enlarged glands  ENDOCRINE: No heat or cold intolerance; No hair loss  MUSCULOSKELETAL: No joint pain or swelling; No muscle, back, or extremity pain  PSYCHIATRIC: No depression, anxiety, mood swings, or difficulty sleeping  HEME/LYMPH: No easy bruising, or bleeding gums  ALLERY AND IMMUNOLOGIC: No hives or eczema    Vital Signs Last 24 Hrs  T(C): 36.5 (01 Dec 2024 20:47), Max: 36.8 (01 Dec 2024 06:15)  T(F): 97.7 (01 Dec 2024 20:47), Max: 98.3 (01 Dec 2024 06:15)  HR: 59 (01 Dec 2024 20:47) (59 - 64)  BP: 121/61 (01 Dec 2024 20:47) (121/61 - 156/65)  BP(mean): 94 (01 Dec 2024 06:15) (94 - 94)  RR: 18 (01 Dec 2024 20:47) (18 - 18)  SpO2: 97% (01 Dec 2024 20:47) (96% - 97%)    Parameters below as of 01 Dec 2024 20:47  Patient On (Oxygen Delivery Method): room air          PHYSICAL EXAM:  GENERAL: NAD, well-groomed, well-developed  HEAD:  Atraumatic, Normocephalic  EYES: EOMI, PERRLA, conjunctiva and sclera clear  ENMT: No tonsillar erythema, exudates, or enlargement; Moist mucous membranes, Good dentition, No lesions  NECK: Supple, No JVD, Normal thyroid  NERVOUS SYSTEM:  Alert & Oriented X3, Good concentration; Motor Strength 5/5 B/L upper and lower extremities; DTRs 2+ intact and symmetric  CHEST/LUNG: Clear to percussion bilaterally; No rales, rhonchi, wheezing, or rubs  HEART: Regular rate and rhythm; No murmurs, rubs, or gallops  ABDOMEN: Soft, Nontender, Nondistended; Bowel sounds present  EXTREMITIES:  2+ Peripheral Pulses, No clubbing, cyanosis, or edema  LYMPH: No lymphadenopathy noted  SKIN: No rashes or lesions      INTERPRETATION OF TELEMETRY:    ECG:    ALONZODSVASC:     LABS:                        9.4    9.73  )-----------( 265      ( 01 Dec 2024 06:23 )             27.7     12-01    137  |  103  |  42[H]  ----------------------------<  121[H]  4.7   |  28  |  7.37[H]    Ca    8.0[L]      01 Dec 2024 06:23  Phos  4.8     12-01  Mg     1.8     12-01    TPro  6.4  /  Alb  2.4[L]  /  TBili  0.7  /  DBili  x   /  AST  79[H]  /  ALT  46  /  AlkPhos  239[H]  12-01          Urinalysis Basic - ( 01 Dec 2024 06:23 )    Color: x / Appearance: x / SG: x / pH: x  Gluc: 121 mg/dL / Ketone: x  / Bili: x / Urobili: x   Blood: x / Protein: x / Nitrite: x   Leuk Esterase: x / RBC: x / WBC x   Sq Epi: x / Non Sq Epi: x / Bacteria: x      Lipid Panel:   I&O's Summary    30 Nov 2024 07:01  -  01 Dec 2024 07:00  --------------------------------------------------------  IN: 600 mL / OUT: 1885 mL / NET: -1285 mL    01 Dec 2024 07:01  -  01 Dec 2024 22:18  --------------------------------------------------------  IN: 0 mL / OUT: 120 mL / NET: -120 mL        RADIOLOGY & ADDITIONAL STUDIES:    < from: Transthoracic Echocardiogram (11.04.23 @ 16:00) >  ------------------------------------------------------------------------  CONCLUSIONS:  1. Trace mitral regurgitation.  2.  Trace aortic regurgitation.  3. Moderately dilated left atrium.  LA volume index = 46  cc/m2.  4. Severe left ventricular enlargement.  5. Moderate global left ventricular systolic dysfunction.  6. Grade III-IV diastolic dysfunction (severe).  7. Right ventricle not well visualized.    Probably normal  right ventricular size and systolic function.  8. Trace pericardial effusion.    ------------------------------------------------------------------------  Confirmed on  11/5/2023 - 15:50:34 by Deep Benoit MD  ------------------------------------------------------------------------    < end of copied text >

## 2024-12-02 ENCOUNTER — TRANSCRIPTION ENCOUNTER (OUTPATIENT)
Age: 54
End: 2024-12-02

## 2024-12-02 VITALS
DIASTOLIC BLOOD PRESSURE: 76 MMHG | RESPIRATION RATE: 16 BRPM | HEART RATE: 63 BPM | SYSTOLIC BLOOD PRESSURE: 172 MMHG | TEMPERATURE: 97 F

## 2024-12-02 LAB
ALBUMIN SERPL ELPH-MCNC: 2.1 G/DL — LOW (ref 3.5–5)
ALP SERPL-CCNC: 180 U/L — HIGH (ref 40–120)
ALT FLD-CCNC: 17 U/L DA — SIGNIFICANT CHANGE UP (ref 10–60)
ANION GAP SERPL CALC-SCNC: 6 MMOL/L — SIGNIFICANT CHANGE UP (ref 5–17)
AST SERPL-CCNC: 34 U/L — SIGNIFICANT CHANGE UP (ref 10–40)
BILIRUB SERPL-MCNC: 0.5 MG/DL — SIGNIFICANT CHANGE UP (ref 0.2–1.2)
BUN SERPL-MCNC: 62 MG/DL — HIGH (ref 7–18)
CALCIUM SERPL-MCNC: 8 MG/DL — LOW (ref 8.4–10.5)
CHLORIDE SERPL-SCNC: 104 MMOL/L — SIGNIFICANT CHANGE UP (ref 96–108)
CO2 SERPL-SCNC: 27 MMOL/L — SIGNIFICANT CHANGE UP (ref 22–31)
CREAT SERPL-MCNC: 8.91 MG/DL — HIGH (ref 0.5–1.3)
EGFR: 6 ML/MIN/1.73M2 — LOW
GLUCOSE BLDC GLUCOMTR-MCNC: 170 MG/DL — HIGH (ref 70–99)
GLUCOSE SERPL-MCNC: 134 MG/DL — HIGH (ref 70–99)
HCT VFR BLD CALC: 25.4 % — LOW (ref 39–50)
HGB BLD-MCNC: 8.6 G/DL — LOW (ref 13–17)
MAGNESIUM SERPL-MCNC: 1.7 MG/DL — SIGNIFICANT CHANGE UP (ref 1.6–2.6)
MCHC RBC-ENTMCNC: 32.8 PG — SIGNIFICANT CHANGE UP (ref 27–34)
MCHC RBC-ENTMCNC: 33.9 G/DL — SIGNIFICANT CHANGE UP (ref 32–36)
MCV RBC AUTO: 96.9 FL — SIGNIFICANT CHANGE UP (ref 80–100)
NRBC # BLD: 0 /100 WBCS — SIGNIFICANT CHANGE UP (ref 0–0)
PHOSPHATE SERPL-MCNC: 5.8 MG/DL — HIGH (ref 2.5–4.5)
PLATELET # BLD AUTO: 250 K/UL — SIGNIFICANT CHANGE UP (ref 150–400)
POTASSIUM SERPL-MCNC: 5.1 MMOL/L — SIGNIFICANT CHANGE UP (ref 3.5–5.3)
POTASSIUM SERPL-SCNC: 5.1 MMOL/L — SIGNIFICANT CHANGE UP (ref 3.5–5.3)
PROT SERPL-MCNC: 5.9 G/DL — LOW (ref 6–8.3)
RBC # BLD: 2.62 M/UL — LOW (ref 4.2–5.8)
RBC # FLD: 15.5 % — HIGH (ref 10.3–14.5)
SODIUM SERPL-SCNC: 137 MMOL/L — SIGNIFICANT CHANGE UP (ref 135–145)
WBC # BLD: 8.79 K/UL — SIGNIFICANT CHANGE UP (ref 3.8–10.5)
WBC # FLD AUTO: 8.79 K/UL — SIGNIFICANT CHANGE UP (ref 3.8–10.5)

## 2024-12-02 PROCEDURE — 80053 COMPREHEN METABOLIC PANEL: CPT

## 2024-12-02 PROCEDURE — 96365 THER/PROPH/DIAG IV INF INIT: CPT

## 2024-12-02 PROCEDURE — 86850 RBC ANTIBODY SCREEN: CPT

## 2024-12-02 PROCEDURE — 86900 BLOOD TYPING SEROLOGIC ABO: CPT

## 2024-12-02 PROCEDURE — 84100 ASSAY OF PHOSPHORUS: CPT

## 2024-12-02 PROCEDURE — S2900: CPT

## 2024-12-02 PROCEDURE — 85730 THROMBOPLASTIN TIME PARTIAL: CPT

## 2024-12-02 PROCEDURE — 83605 ASSAY OF LACTIC ACID: CPT

## 2024-12-02 PROCEDURE — 76000 FLUOROSCOPY <1 HR PHYS/QHP: CPT

## 2024-12-02 PROCEDURE — 86901 BLOOD TYPING SEROLOGIC RH(D): CPT

## 2024-12-02 PROCEDURE — 82962 GLUCOSE BLOOD TEST: CPT

## 2024-12-02 PROCEDURE — 93005 ELECTROCARDIOGRAM TRACING: CPT

## 2024-12-02 PROCEDURE — A9537: CPT

## 2024-12-02 PROCEDURE — 85025 COMPLETE CBC W/AUTO DIFF WBC: CPT

## 2024-12-02 PROCEDURE — 83690 ASSAY OF LIPASE: CPT

## 2024-12-02 PROCEDURE — 99261: CPT

## 2024-12-02 PROCEDURE — P9047: CPT

## 2024-12-02 PROCEDURE — 96376 TX/PRO/DX INJ SAME DRUG ADON: CPT

## 2024-12-02 PROCEDURE — 78226 HEPATOBILIARY SYSTEM IMAGING: CPT | Mod: MC

## 2024-12-02 PROCEDURE — 99285 EMERGENCY DEPT VISIT HI MDM: CPT

## 2024-12-02 PROCEDURE — 85610 PROTHROMBIN TIME: CPT

## 2024-12-02 PROCEDURE — 81001 URINALYSIS AUTO W/SCOPE: CPT

## 2024-12-02 PROCEDURE — C1889: CPT

## 2024-12-02 PROCEDURE — 88304 TISSUE EXAM BY PATHOLOGIST: CPT

## 2024-12-02 PROCEDURE — 76705 ECHO EXAM OF ABDOMEN: CPT

## 2024-12-02 PROCEDURE — 99239 HOSP IP/OBS DSCHRG MGMT >30: CPT

## 2024-12-02 PROCEDURE — 36415 COLL VENOUS BLD VENIPUNCTURE: CPT

## 2024-12-02 PROCEDURE — 87324 CLOSTRIDIUM AG IA: CPT

## 2024-12-02 PROCEDURE — 96375 TX/PRO/DX INJ NEW DRUG ADDON: CPT

## 2024-12-02 PROCEDURE — 83735 ASSAY OF MAGNESIUM: CPT

## 2024-12-02 PROCEDURE — 87449 NOS EACH ORGANISM AG IA: CPT

## 2024-12-02 PROCEDURE — 71045 X-RAY EXAM CHEST 1 VIEW: CPT

## 2024-12-02 PROCEDURE — 85027 COMPLETE CBC AUTOMATED: CPT

## 2024-12-02 PROCEDURE — 74176 CT ABD & PELVIS W/O CONTRAST: CPT | Mod: MC

## 2024-12-02 RX ORDER — POLYETHYLENE GLYCOL 3350 17 G/17G
17 POWDER, FOR SOLUTION ORAL DAILY
Refills: 0 | Status: DISCONTINUED | OUTPATIENT
Start: 2024-12-02 | End: 2024-12-02

## 2024-12-02 RX ORDER — CLOPIDOGREL 75 MG/1
1 TABLET, FILM COATED ORAL
Refills: 0 | DISCHARGE

## 2024-12-02 RX ORDER — FUROSEMIDE 40 MG/1
1 TABLET ORAL
Refills: 0 | DISCHARGE

## 2024-12-02 RX ORDER — OXYCODONE HYDROCHLORIDE 30 MG/1
1 TABLET ORAL
Qty: 9 | Refills: 0
Start: 2024-12-02 | End: 2024-12-04

## 2024-12-02 RX ADMIN — POLYETHYLENE GLYCOL 3350 17 GRAM(S): 17 POWDER, FOR SOLUTION ORAL at 11:10

## 2024-12-02 RX ADMIN — HYDROMORPHONE HYDROCHLORIDE 0.5 MILLIGRAM(S): 2 TABLET ORAL at 04:53

## 2024-12-02 RX ADMIN — CARVEDILOL 12.5 MILLIGRAM(S): 25 TABLET, FILM COATED ORAL at 05:16

## 2024-12-02 RX ADMIN — ACETAMINOPHEN 500MG 650 MILLIGRAM(S): 500 TABLET, COATED ORAL at 13:18

## 2024-12-02 RX ADMIN — PANTOPRAZOLE SODIUM 40 MILLIGRAM(S): 40 TABLET, DELAYED RELEASE ORAL at 05:16

## 2024-12-02 RX ADMIN — Medication 1 APPLICATION(S): at 05:16

## 2024-12-02 RX ADMIN — CHLORHEXIDINE GLUCONATE 1 APPLICATION(S): 1.2 RINSE ORAL at 11:10

## 2024-12-02 RX ADMIN — LISINOPRIL 40 MILLIGRAM(S): 20 TABLET ORAL at 05:16

## 2024-12-02 RX ADMIN — Medication 1 APPLICATION(S): at 11:11

## 2024-12-02 RX ADMIN — HYDROMORPHONE HYDROCHLORIDE 0.5 MILLIGRAM(S): 2 TABLET ORAL at 03:53

## 2024-12-02 RX ADMIN — ACETAMINOPHEN 500MG 650 MILLIGRAM(S): 500 TABLET, COATED ORAL at 14:00

## 2024-12-02 NOTE — PROGRESS NOTE ADULT - EYES
PERRL/EOMI/conjunctiva clear/non icteric sclera
PERRL/EOMI/conjunctiva clear/conjunctiva inflamed/non icteric sclera
PERRL/EOMI/conjunctiva clear/non icteric sclera
PERRL/EOMI/conjunctiva clear/non icteric sclera

## 2024-12-02 NOTE — DISCHARGE NOTE NURSING/CASE MANAGEMENT/SOCIAL WORK - NSDCPEFALRISK_GEN_ALL_CORE
For information on Fall & Injury Prevention, visit: [email or web address] OR  [email or web address] OR  https://www.cdc.gov/steadi/patient.html

## 2024-12-02 NOTE — PROGRESS NOTE ADULT - NEGATIVE ENDOCRINE SYMPTOMS
no cold intolerance/no heat intolerance/no striae

## 2024-12-02 NOTE — PROGRESS NOTE ADULT - TONSILS
no redness/no swelling/no discharge
no redness/no swelling
no redness/no swelling
no redness/no swelling/no discharge

## 2024-12-02 NOTE — PROGRESS NOTE ADULT - NEGATIVE RESPIRATORY AND THORAX SYMPTOMS
no wheezing/no dyspnea/no cough/no hemoptysis/no pleuritic chest pain

## 2024-12-02 NOTE — DISCHARGE NOTE NURSING/CASE MANAGEMENT/SOCIAL WORK - NSDCFUADDAPPT_GEN_ALL_CORE_FT
APPTS ARE READY TO BE MADE: [x] YES    Best Family or Patient Contact (if needed):    Additional Information about above appointments (if needed):    1: Surgery  2: PCP   3: Nephrology    Other comments or requests:

## 2024-12-02 NOTE — PROGRESS NOTE ADULT - NEGATIVE NEUROLOGICAL SYMPTOMS
no focal seizures/no syncope/no tremors/no vertigo/no loss of sensation/no headache

## 2024-12-02 NOTE — PROGRESS NOTE ADULT - NEGATIVE GENERAL SYMPTOMS
no fever/no chills/no sweating/no polyphagia/no polyuria/no polydipsia
no fever/no chills/no sweating/no polyphagia/no polyuria/no polydipsia
no fever/no chills/no sweating/no anorexia
ANY BED
no fever/no chills/no sweating/no polyphagia/no polyuria/no polydipsia

## 2024-12-02 NOTE — PROGRESS NOTE ADULT - GIT GEN HX ROS MEA POS PC
nausea/vomiting/diarrhea/abdominal pain

## 2024-12-02 NOTE — DISCHARGE NOTE NURSING/CASE MANAGEMENT/SOCIAL WORK - NSDCVIVACCINE_GEN_ALL_CORE_FT
Tdap; 14-Dec-2021 19:18; Roxi Lewis (MARIA ELENA); [employer]; [id number] (Exp. Date: 09-Sep-2023); IntraMuscular; Deltoid Right.; 0.5 milliLiter(s); VIS (VIS Published: 09-May-2013, VIS Presented: 14-Dec-2021);

## 2024-12-02 NOTE — PROGRESS NOTE ADULT - NECK
supple/symmetric/no tracheal deviation
supple/symmetric/no tracheal deviation
supple/symmetric
supple/symmetric/no tracheal deviation

## 2024-12-02 NOTE — PROGRESS NOTE ADULT - SUBJECTIVE AND OBJECTIVE BOX
NEPHROLOGY MEDICAL CARE, [facility] - Dr. Domenic Griffiths/ Dr. Bishnu Amador/ Dr. Fili Smiley/ Dr. Naomie Crowder    Date of Service: 12-02-24    Patient was seen and examined at bedside.    CC: patient is okay and Nad    Vital Signs Last 24 Hrs  T(C): 36.3 (02 Dec 2024 17:16), Max: 36.5 (01 Dec 2024 20:47)  T(F): 97.3 (02 Dec 2024 17:16), Max: 97.7 (01 Dec 2024 20:47)  HR: 63 (02 Dec 2024 17:16) (59 - 65)  BP: 172/76 (02 Dec 2024 17:16) (121/61 - 172/76)  BP(mean): --  RR: 16 (02 Dec 2024 17:16) (16 - 18)  SpO2: 96% (02 Dec 2024 04:02) (96% - 97%)    Parameters below as of 02 Dec 2024 04:02  Patient On (Oxygen Delivery Method): room air        12-01 @ 07:01  -  12-02 @ 07:00  --------------------------------------------------------  IN: 0 mL / OUT: 320 mL / NET: -320 mL    12-02 @ 07:01  -  12-02 @ 17:27  --------------------------------------------------------  IN: 500 mL / OUT: 1740 mL / NET: -1240 mL        PHYSICAL EXAM:  General: No acute respiratory distress.  Eyes: conjunctiva and sclera clear  ENMT: Atraumatic, Normocephalic  Respiratory:   Bilaterally clear lungs; No rales, rhonchi, wheezing  Cardiovascular: S1S2+; no m/r/g  Gastrointestinal: Soft, mild tenderness, Nondistended; Bowel sounds present  Neuro:  Awake, Alert & Oriented X3  Ext:  No edema, No Cyanosis  Skin: No rashes  Dialysis Access::   Left upper arm AVF thrill/bruit+       MEDICATIONS:  MEDICATIONS  (STANDING):  bisacodyl 5 milliGRAM(s) Oral at bedtime  carvedilol 12.5 milliGRAM(s) Oral every 12 hours  cefTRIAXone   IVPB 1000 milliGRAM(s) IV Intermittent every 24 hours  chlorhexidine 2% Cloths 1 Application(s) Topical daily  enoxaparin Injectable 30 milliGRAM(s) SubCutaneous every 24 hours  epoetin ignacia-epbx (RETACRIT) Injectable 4000 Unit(s) IV Push <User Schedule>  erythromycin   Ointment 1 Application(s) Left EYE four times a day  lisinopril 40 milliGRAM(s) Oral daily  NIFEdipine XL 90 milliGRAM(s) Oral <User Schedule>  pantoprazole    Tablet 40 milliGRAM(s) Oral before breakfast  polyethylene glycol 3350 17 Gram(s) Oral daily  traZODone 100 milliGRAM(s) Oral at bedtime    MEDICATIONS  (PRN):  acetaminophen     Tablet .. 650 milliGRAM(s) Oral every 6 hours PRN Temp greater or equal to 38C (100.4F), Mild Pain (1 - 3)  ondansetron Injectable 4 milliGRAM(s) IV Push every 8 hours PRN Nausea and/or Vomiting  oxycodone    5 mG/acetaminophen 325 mG 1 Tablet(s) Oral every 4 hours PRN Moderate Pain (4 - 6)          LABS:                        8.6    8.79  )-----------( 250      ( 02 Dec 2024 14:44 )             25.4     12-02    137  |  104  |  62[H]  ----------------------------<  134[H]  5.1   |  27  |  8.91[H]    Ca    8.0[L]      02 Dec 2024 14:44  Phos  5.8     12-02  Mg     1.7     12-02    TPro  5.9[L]  /  Alb  2.1[L]  /  TBili  0.5  /  DBili  x   /  AST  34  /  ALT  17  /  AlkPhos  180[H]  12-02      Urinalysis Basic - ( 02 Dec 2024 14:44 )    Color: x / Appearance: x / SG: x / pH: x  Gluc: 134 mg/dL / Ketone: x  / Bili: x / Urobili: x   Blood: x / Protein: x / Nitrite: x   Leuk Esterase: x / RBC: x / WBC x   Sq Epi: x / Non Sq Epi: x / Bacteria: x      Magnesium: 1.7 mg/dL (12-02 @ 14:44)  Phosphorus: 5.8 mg/dL (12-02 @ 14:44)    Urine studies    PTH and Vit D:

## 2024-12-02 NOTE — PROGRESS NOTE ADULT - REASON FOR ADMISSION
Vomiting and diarrhea

## 2024-12-02 NOTE — PROGRESS NOTE ADULT - NEGATIVE CARDIOVASCULAR SYMPTOMS
no chest pain/no palpitations/no orthopnea/no peripheral edema

## 2024-12-02 NOTE — PROGRESS NOTE ADULT - SUBJECTIVE AND OBJECTIVE BOX
[   ] ICU                                          [   ] CCU                                      [  X ] Medical Floor    Patient is a 54 year old male with abdominal pain, diarrhea and vomiting. GI consulted to evaluate.         Patient is a 54 year old male with past medical history significant for HTN, DM, Hyperlipidemia, ESRD on HD, CAD, CHF recently treated for ascites with SBP presented to the emergency room with 2 days history of watery, non bloody diarrhea associated with intermittent diffuse 8/10 intensity non radiating crampy abdominal pain and intractable non bloody vomiting. Patient reported >10 episodes of diarrhea and >5 episodes of vomiting. Patient denies hematemesis, hematochezia, melena, fever, chills, chest pain, SOOB, cough, hematuria, dysuria or recent traveling.     Patient appears comfortable. No new complaints reported, No abdominal pain, N/V, hematemesis, hematochezia, melena, fever, chills, chest pain, SOB, cough or diarrhea reported.        PAST MEDICAL HISTORY    Type 2 diabetes mellitus    Hypertension    HLD (hyperlipidemia)    ESRD on HD    Bone spur    Anemia    Injury of right wrist         PAST SURGICAL HISTORY    Arthroscopy of right shoulder    Vascular access device    Right wrist surgery    AV fistula    Ventral hernia repair        Allergies    No Known Allergies    Intolerances  None          SOCIAL HISTORY  Advanced Directives:       [ X ] Full Code       [  ] DNR  Marital Status:         [  ] M      [  X] S      [  ] D       [  ] W  Children:       [ X ] Yes      [  ] No  Occupation:        [  ] Employed       [ X ] Unemployed       [  ] Retired  Diet:       [ X ] Regular       [  ] PEG feeding          [  ] NG tube feeding  Drug Use:           [ X ] Patient denied          [  ] Yes  Alcohol:           [ X ] No             [  ] Yes (socially)         [  ] Yes (chronic)  Tobacco:           [  ] Yes           [ X ] No        FAMILY HISTORY  [ X ] Heart Disease            [ X ] Diabetes             [ X ] HTN             [  ] Colon Cancer             [  ] Stomach Cancer              [  ] Pancreatic Cancer        VITALS   Vital Signs Last 24 Hrs  T(C): 36.3 (12-02-24 @ 04:02), Max: 36.8 (12-01-24 @ 12:15)  T(F): 97.4 (12-02-24 @ 04:02), Max: 98.3 (12-01-24 @ 12:15)  HR: 65 (12-02-24 @ 04:02) (59 - 65)  BP: 148/75 (12-02-24 @ 04:02) (121/61 - 156/65)   RR: 18 (12-02-24 @ 04:02) (18 - 18)  SpO2: 96% (12-02-24 @ 04:02) (96% - 97%)      MEDICATIONS  (STANDING):  bisacodyl 5 milliGRAM(s) Oral at bedtime  carvedilol 12.5 milliGRAM(s) Oral every 12 hours  cefTRIAXone   IVPB 1000 milliGRAM(s) IV Intermittent every 24 hours  chlorhexidine 2% Cloths 1 Application(s) Topical daily  enoxaparin Injectable 30 milliGRAM(s) SubCutaneous every 24 hours  epoetin ignacia-epbx (RETACRIT) Injectable 4000 Unit(s) IV Push <User Schedule>  erythromycin   Ointment 1 Application(s) Left EYE four times a day  lisinopril 40 milliGRAM(s) Oral daily  NIFEdipine XL 90 milliGRAM(s) Oral <User Schedule>  pantoprazole    Tablet 40 milliGRAM(s) Oral before breakfast  traZODone 100 milliGRAM(s) Oral at bedtime    MEDICATIONS  (PRN):  acetaminophen     Tablet .. 650 milliGRAM(s) Oral every 6 hours PRN Temp greater or equal to 38C (100.4F), Mild Pain (1 - 3)  HYDROmorphone  Injectable 0.5 milliGRAM(s) IV Push every 4 hours PRN Severe Pain (7 - 10)  ondansetron Injectable 4 milliGRAM(s) IV Push every 8 hours PRN Nausea and/or Vomiting  oxycodone    5 mG/acetaminophen 325 mG 1 Tablet(s) Oral every 4 hours PRN Moderate Pain (4 - 6)                            9.4    9.73  )-----------( 265      ( 01 Dec 2024 06:23 )             27.7       12-01    137  |  103  |  42[H]  ----------------------------<  121[H]  4.7   |  28  |  7.37[H]    Ca    8.0[L]      01 Dec 2024 06:23  Phos  4.8     12-01  Mg     1.8     12-01    TPro  6.4  /  Alb  2.4[L]  /  TBili  0.7  /  DBili  x   /  AST  79[H]  /  ALT  46  /  AlkPhos  239[H]  12-01

## 2024-12-02 NOTE — PROGRESS NOTE ADULT - ASSESSMENT
1. ESRD on HD (M/W/F)  -s/p HD today without issues. HD orders were written and discussed with dialysis nurse.   -Hemodialysis Renal Diet and Fluid restriction to 1L/day  -Adjust meds to eGFR and avoid IV Gadolinium contrast,NSAIDs, and phosphate enema.  -Monitor Electrolytes daily.  2. HTN:   -bp is acceptable at present.  -continue bp meds  -titrate bp meds to keep sbp >110 and < 130  3. Anemia of ESRD:  -on epogen 4000 tiw  -F/u CBC daily  -transfuse if HB < 7.0.  4. Mineral Bone Disease:  -continue phoslo tid.   -monitor phos  5. Abd pain possible cholecystitis  -s/p paracentesis 4L on 11/19.   -s/p Lap darryl on 11/29.   -plan as per Surgery.

## 2024-12-02 NOTE — PROGRESS NOTE ADULT - PROVIDER SPECIALTY LIST ADULT
Gastroenterology
Surgery
Gastroenterology
Internal Medicine
Internal Medicine
Nephrology
Nephrology
Cardiology
Cardiology
Surgery
Nephrology
Nephrology
Surgery
Surgery
Cardiology
Internal Medicine
Internal Medicine
Gastroenterology
Gastroenterology

## 2024-12-02 NOTE — DISCHARGE NOTE NURSING/CASE MANAGEMENT/SOCIAL WORK - PATIENT PORTAL LINK FT
You can access the FollowMyHealth Patient Portal offered by [facility] by registering at the following website: [email or web address]. By joining [facility]’s FollowMyHealth portal, you will also be able to view your health information using other applications (apps) compatible with our system.

## 2024-12-02 NOTE — PROGRESS NOTE ADULT - PSY GEN HX ROS MEA POS PC
suicidal ideation/depression/insomnia/memory loss/paranoia/mood swings/agitation

## 2024-12-02 NOTE — PROGRESS NOTE ADULT - NEGATIVE MUSCULOSKELETAL SYMPTOMS
no muscle cramps/no stiffness/no neck pain/no arm pain L/no arm pain R/no leg pain L/no leg pain R

## 2024-12-02 NOTE — PROGRESS NOTE ADULT - NEGATIVE SKIN SYMPTOMS
no rash/no itching/no dryness/no brittle nails/no pitted nails/no hair loss
no rash/no itching/no dryness/no brittle nails/no pitted nails
no rash/no itching/no dryness/no brittle nails/no pitted nails/no hair loss
no rash/no itching/no dryness/no brittle nails/no pitted nails/no hair loss

## 2024-12-02 NOTE — DISCHARGE NOTE NURSING/CASE MANAGEMENT/SOCIAL WORK - FINANCIAL ASSISTANCE
[facility] provides services at a reduced cost to those who are determined to be eligible through [facility]’s financial assistance program. Information regarding [facility]’s financial assistance program can be found by going to [email or web address] or by calling [phone].

## 2024-12-02 NOTE — PROGRESS NOTE ADULT - NEGATIVE GASTROINTESTINAL SYMPTOMS
no melena/no hematochezia/no steatorrhea/no jaundice/no hiccoughs

## 2024-12-10 ENCOUNTER — NON-APPOINTMENT (OUTPATIENT)
Age: 54
End: 2024-12-10

## 2024-12-10 ENCOUNTER — APPOINTMENT (OUTPATIENT)
Dept: SURGERY | Facility: CLINIC | Age: 54
End: 2024-12-10
Payer: MEDICARE

## 2024-12-10 VITALS
BODY MASS INDEX: 26.18 KG/M2 | SYSTOLIC BLOOD PRESSURE: 176 MMHG | DIASTOLIC BLOOD PRESSURE: 70 MMHG | HEART RATE: 65 BPM | HEIGHT: 71 IN | WEIGHT: 187 LBS

## 2024-12-10 DIAGNOSIS — Z90.49 ACQUIRED ABSENCE OF OTHER SPECIFIED PARTS OF DIGESTIVE TRACT: ICD-10-CM

## 2024-12-10 LAB — SURGICAL PATHOLOGY STUDY: SIGNIFICANT CHANGE UP

## 2024-12-10 PROCEDURE — 99024 POSTOP FOLLOW-UP VISIT: CPT

## 2024-12-12 NOTE — CHART NOTE - NSCHARTNOTEFT_GEN_A_CORE
General Surgery    Subjective:  Pt resting comfortably. Complaining of left eye pain post procedure, was given tetracaine with relief and wants it again.  Has no voided.    T(C): 37 (11-29-24 @ 15:03), Max: 37 (11-29-24 @ 14:18)  HR: 61 (11-29-24 @ 17:00) (58 - 66)  BP: 157/67 (11-29-24 @ 17:00) (131/65 - 158/75)  RR: 14 (11-29-24 @ 17:00) (12 - 20)  SpO2: 95% (11-29-24 @ 17:00) (92% - 99%)    Physical:  Gen: A&O x3  ENT: Declined eye exam, no surrounding erythema on eyelids  Abd: Soft ND, dermabond in place c/d/i, rhett x 1     54 year old male POD#0 from RA darryl with -IOC, found to have gangrenous gallbladder.     - low fat diet  - pain control prn  - DVT ppx  - Incentive spirometry  - HD tomorrow per nephro   - rhett x 1, drain care
Nephro note: pt was taken to HIDA scan for long period of time and unable to goto HD afterwards; electrolytes are stable and pt is not   fluid overloaded. no urgent need for HD today and will reassess tomorrow if needed. d/w dialysis nurse already.
Patient was outreached about PCP but did not answer. A voicemail was left for the patient to return our call.
Patient was outreached on 12/11 and 12/12, about PCP  but did not answer. A voicemail was left for the patient to return our call.

## 2024-12-18 ENCOUNTER — INPATIENT (INPATIENT)
Facility: HOSPITAL | Age: 54
LOS: 4 days | Discharge: ANOTHER HOSPITAL | DRG: 189 | End: 2024-12-23
Payer: MEDICARE

## 2024-12-18 VITALS
HEIGHT: 71 IN | OXYGEN SATURATION: 86 % | SYSTOLIC BLOOD PRESSURE: 136 MMHG | DIASTOLIC BLOOD PRESSURE: 72 MMHG | WEIGHT: 195.11 LBS | HEART RATE: 84 BPM | RESPIRATION RATE: 16 BRPM | TEMPERATURE: 99 F

## 2024-12-18 DIAGNOSIS — Z98.890 OTHER SPECIFIED POSTPROCEDURAL STATES: Chronic | ICD-10-CM

## 2024-12-18 DIAGNOSIS — I77.0 ARTERIOVENOUS FISTULA, ACQUIRED: Chronic | ICD-10-CM

## 2024-12-18 LAB
ALBUMIN SERPL ELPH-MCNC: 2.6 G/DL — LOW (ref 3.5–5)
ALP SERPL-CCNC: 118 U/L — SIGNIFICANT CHANGE UP (ref 40–120)
ALT FLD-CCNC: 16 U/L DA — SIGNIFICANT CHANGE UP (ref 10–60)
ANION GAP SERPL CALC-SCNC: 7 MMOL/L — SIGNIFICANT CHANGE UP (ref 5–17)
ANISOCYTOSIS BLD QL: SLIGHT — SIGNIFICANT CHANGE UP
AST SERPL-CCNC: 29 U/L — SIGNIFICANT CHANGE UP (ref 10–40)
BASOPHILS # BLD AUTO: 0.04 K/UL — SIGNIFICANT CHANGE UP (ref 0–0.2)
BASOPHILS NFR BLD AUTO: 0.4 % — SIGNIFICANT CHANGE UP (ref 0–2)
BILIRUB SERPL-MCNC: 0.7 MG/DL — SIGNIFICANT CHANGE UP (ref 0.2–1.2)
BUN SERPL-MCNC: 29 MG/DL — HIGH (ref 7–18)
CALCIUM SERPL-MCNC: 8.8 MG/DL — SIGNIFICANT CHANGE UP (ref 8.4–10.5)
CHLORIDE SERPL-SCNC: 98 MMOL/L — SIGNIFICANT CHANGE UP (ref 96–108)
CO2 SERPL-SCNC: 27 MMOL/L — SIGNIFICANT CHANGE UP (ref 22–31)
CREAT SERPL-MCNC: 3.89 MG/DL — HIGH (ref 0.5–1.3)
EGFR: 17 ML/MIN/1.73M2 — LOW
EOSINOPHIL # BLD AUTO: 0.6 K/UL — HIGH (ref 0–0.5)
EOSINOPHIL NFR BLD AUTO: 6 % — SIGNIFICANT CHANGE UP (ref 0–6)
FLUAV AG NPH QL: SIGNIFICANT CHANGE UP
FLUBV AG NPH QL: SIGNIFICANT CHANGE UP
GLUCOSE SERPL-MCNC: 112 MG/DL — HIGH (ref 70–99)
HCT VFR BLD CALC: 24.6 % — LOW (ref 39–50)
HGB BLD-MCNC: 8.4 G/DL — LOW (ref 13–17)
HYPOCHROMIA BLD QL: SLIGHT — SIGNIFICANT CHANGE UP
IMM GRANULOCYTES NFR BLD AUTO: 0.2 % — SIGNIFICANT CHANGE UP (ref 0–0.9)
LG PLATELETS BLD QL AUTO: SLIGHT — SIGNIFICANT CHANGE UP
LYMPHOCYTES # BLD AUTO: 0.44 K/UL — LOW (ref 1–3.3)
LYMPHOCYTES # BLD AUTO: 4.4 % — LOW (ref 13–44)
MAGNESIUM SERPL-MCNC: 2 MG/DL — SIGNIFICANT CHANGE UP (ref 1.6–2.6)
MANUAL SMEAR VERIFICATION: SIGNIFICANT CHANGE UP
MCHC RBC-ENTMCNC: 32.9 PG — SIGNIFICANT CHANGE UP (ref 27–34)
MCHC RBC-ENTMCNC: 34.1 G/DL — SIGNIFICANT CHANGE UP (ref 32–36)
MCV RBC AUTO: 96.5 FL — SIGNIFICANT CHANGE UP (ref 80–100)
MONOCYTES # BLD AUTO: 0.56 K/UL — SIGNIFICANT CHANGE UP (ref 0–0.9)
MONOCYTES NFR BLD AUTO: 5.6 % — SIGNIFICANT CHANGE UP (ref 2–14)
NEUTROPHILS # BLD AUTO: 8.26 K/UL — HIGH (ref 1.8–7.4)
NEUTROPHILS NFR BLD AUTO: 83.4 % — HIGH (ref 43–77)
NRBC # BLD: 0 /100 WBCS — SIGNIFICANT CHANGE UP (ref 0–0)
PHOSPHATE SERPL-MCNC: 3 MG/DL — SIGNIFICANT CHANGE UP (ref 2.5–4.5)
PLAT MORPH BLD: NORMAL — SIGNIFICANT CHANGE UP
PLATELET # BLD AUTO: 223 K/UL — SIGNIFICANT CHANGE UP (ref 150–400)
POLYCHROMASIA BLD QL SMEAR: SLIGHT — SIGNIFICANT CHANGE UP
POTASSIUM SERPL-MCNC: 4.3 MMOL/L — SIGNIFICANT CHANGE UP (ref 3.5–5.3)
POTASSIUM SERPL-SCNC: 4.3 MMOL/L — SIGNIFICANT CHANGE UP (ref 3.5–5.3)
PROT SERPL-MCNC: 7.4 G/DL — SIGNIFICANT CHANGE UP (ref 6–8.3)
RBC # BLD: 2.55 M/UL — LOW (ref 4.2–5.8)
RBC # FLD: 14.1 % — SIGNIFICANT CHANGE UP (ref 10.3–14.5)
RBC BLD AUTO: ABNORMAL
RSV RNA NPH QL NAA+NON-PROBE: SIGNIFICANT CHANGE UP
SARS-COV-2 RNA SPEC QL NAA+PROBE: SIGNIFICANT CHANGE UP
SODIUM SERPL-SCNC: 132 MMOL/L — LOW (ref 135–145)
TROPONIN I, HIGH SENSITIVITY RESULT: 1719.9 NG/L — HIGH
TROPONIN I, HIGH SENSITIVITY RESULT: 2078.4 NG/L — HIGH
WBC # BLD: 9.92 K/UL — SIGNIFICANT CHANGE UP (ref 3.8–10.5)
WBC # FLD AUTO: 9.92 K/UL — SIGNIFICANT CHANGE UP (ref 3.8–10.5)

## 2024-12-18 PROCEDURE — 71045 X-RAY EXAM CHEST 1 VIEW: CPT | Mod: 26

## 2024-12-18 PROCEDURE — 99291 CRITICAL CARE FIRST HOUR: CPT

## 2024-12-18 RX ORDER — ASPIRIN 81 MG
324 TABLET, DELAYED RELEASE (ENTERIC COATED) ORAL ONCE
Refills: 0 | Status: COMPLETED | OUTPATIENT
Start: 2024-12-18 | End: 2024-12-18

## 2024-12-18 RX ORDER — NITROGLYCERIN 20.8 MG/1
0.4 FILM, EXTENDED RELEASE TRANSDERMAL ONCE
Refills: 0 | Status: COMPLETED | OUTPATIENT
Start: 2024-12-18 | End: 2024-12-18

## 2024-12-18 RX ORDER — BUMETANIDE 2 MG/1
2 TABLET ORAL ONCE
Refills: 0 | Status: COMPLETED | OUTPATIENT
Start: 2024-12-18 | End: 2024-12-18

## 2024-12-18 RX ADMIN — NITROGLYCERIN 0.4 MILLIGRAM(S): 20.8 FILM, EXTENDED RELEASE TRANSDERMAL at 20:35

## 2024-12-18 RX ADMIN — Medication 324 MILLIGRAM(S): at 23:23

## 2024-12-18 RX ADMIN — BUMETANIDE 2 MILLIGRAM(S): 2 TABLET ORAL at 20:53

## 2024-12-18 NOTE — ED PROVIDER NOTE - CARE PLAN
Principal Discharge DX:	Acute hypoxemic respiratory failure  Secondary Diagnosis:	Demand ischemia  Secondary Diagnosis:	Acute pulmonary edema   1

## 2024-12-18 NOTE — ED PROVIDER NOTE - PHYSICAL EXAMINATION
GENERAL: no acute distress; mildly dyspneic   HEAD:  Atraumatic, Normocephalic  EYES: EOMI, PERRLA,   ENT: MMM; oropharynx clear  NECK: Supple, No JVD  CHEST/LUNG: Bibasilar rales  HEART: Regular rate and rhythm; No murmurs, rubs, or gallops  ABDOMEN: Soft, Nontender, Nondistended; Bowel sounds present  EXTREMITIES:  2+ Peripheral Pulses, 1-2+ pitting LE edema. +LUE AVF with palpable thrill/bruit.   PSYCH: AAOx3  NEUROLOGY: no focal motor or sensory deficits. 5/5 muscle strength in all extremities.   SKIN: No rashes or lesions

## 2024-12-18 NOTE — ED ADULT TRIAGE NOTE - IDEAL BODY WEIGHT(KG)
"DOS: 2018  NAME: Sakshi Garcia   : 1940  PCP: Provider Not In System  Chief Complaint   Patient presents with   • Fall     Stroke    Subjective: No events overnight.     Patient denies CP, SOA,  HA, double/blurred vision, dizziness, numbness or loss of sensation or any other new neurological complaints at this time.      Objective:  Wt Readings from Last 3 Encounters:   18 75.8 kg (167 lb)   18 71.2 kg (157 lb)   18 71.2 kg (157 lb)     Temp Readings from Last 3 Encounters:   18 99.2 °F (37.3 °C) (Oral)   17 98.2 °F (36.8 °C) (Oral)     BP Readings from Last 3 Encounters:   18 141/75   17 (!) 192/81     Pulse Readings from Last 3 Encounters:   18 94   17 76       Vital signs: /75 (BP Location: Left arm, Patient Position: Lying)  Pulse 94  Temp 99.2 °F (37.3 °C) (Oral)   Resp 20  Ht 157.5 cm (62.01\")  Wt 75.8 kg (167 lb)  SpO2 94%  BMI 30.54 kg/m2      HEENT: Normocephalic, atraumatic   COR: RRR  Resp: Even and unlabored  Extremities: Equal pulses, non distal embolization    Neurological:   MS: AO to my grandfathers house. Thought is was . [facility]. No neglect. Higher integrative function normal  CN: II-XII normal mild dysarthria noted and decreased shoulder shrug   Motor: Normal strength and tone on the left. RUE strength at least 2/5 proximally, 1/5 distally, RLE at least 2-3/5 (unchanged)   Sensory: Intact to noxious stimuli   Coordination: Normal on left.   NIHSS 7    Laboratory results:  Lab Results   Component Value Date    GLUCOSE 176 (H) 2018    CALCIUM 8.7 2018     2018    K 4.0 2018    CO2 19.1 (L) 2018     (H) 2018    BUN 20 2018    CREATININE 1.29 (H) 2018    EGFRIFNONA 40 (L) 2018    BCR 15.5 2018    ANIONGAP 16.9 2018     Lab Results   Component Value Date    WBC 14.07 (H) 2018    HGB 8.6 (L) 2018    HCT 27.3 (L) 2018    MCV " 97.5 01/08/2018     01/08/2018        Lab Results   Component Value Date    LDLCALC 68 01/06/2018     Lab Results   Component Value Date    HGBA1C 6.50 (H) 01/02/2018     Lab Results   Component Value Date    TSH 0.411 01/06/2018     No results found for: [id number]    Review and interpretation of imaging: Per Dr. Greer.     CT brain 1/4/18:  No acute stroke mass or hemorrhage, there is extensive confluent white matter disease in the periventricular and subcortex most prominent in the bifrontal regions, there is evidence of old lacunar strokes in the thalami as well as right anterior limb internal capsule, this is unchanged compared with the scan from 1/1/18      MRI brain, MRA h/n 1/5/18: IMPRESSION:  1. Multiple lacunar infarcts are appreciated involving the left cerebral  hemisphere suggesting a watershed type distribution with infarcts  involving the frontal, parietal and occipital lobes. There are  questionable areas of restricted diffusion involving the right frontal  lobe. If true this would suggest a proximal cardiac source. However, the  MRA of the neck did demonstrate signal loss involving the proximal  aspect of the left internal carotid artery where there is likely  moderate-to-severe if not severe stenosis using NASCET criteria. There  is signal loss at the origin of the left common carotid artery  suggesting a severe stenosis. There is severe stenosis involving the  right vertebral artery approximately 3 cm beyond the origin and likely  moderate if not moderate-to-severe stenosis involving the right internal  carotid artery. Further evaluation with a CT angiogram of the neck and  head is recommended. A cardiac echo could also be performed.  2. A right pleural effusion is present.      EKG 1/1/18: SR      Carotid US 1/6/18: Per Dr. Greer, PSV on right 211/37, mild to mod stenosis; 557/216 on the left, severe stenosis     TTE 1/6/18: Interpretation Summary   · Left ventricular systolic  function is hyperdynamic (EF > 70).  · calcification of the aortic valve  · Left ventricular wall thickness is consistent with mild concentric hypertrophy.  · Mild tricuspid valve regurgitation is present.  · Mild mitral valve regurgitation is present  · Estimated right ventricular systolic pressure from tricuspid regurgitation is moderately elevated (45-55 mmHg). Mild to moderate pulmonary hypertension is present.         Impression: This is a 78 yo female with history of DM, frequent falls, HTN , stroke and questionable atrial fibrillation who presented to the ED 01/01/2018 with complaint of intermittent lightheadedness. Patient known to have frequent falls and was down on the ground for 13 hours 2 days before admission. Neurology was consulted due to patients right-sided weakness which started after admission; NIHSS 4 at time of consult. MR revealed  Multiple lacunar infarcts are appreciated involving the left cerebral hemisphere. Cardiology plan to do a stress cardiolite tomorrow if negative, an enterectomy and/or endovascular interventions can be considered.      Plan:  Consult cardiology for surgical clearance  Aspirin 325mg  Plavix 75mg  Lipitor 80 mg  Hydrate  Avoid hypotension, SBP goal 150-200  Neurochecks  Non-pharmacological DVT prophylaxis  EKG Tele  PT/OT/ST  Stroke Education  Goal LDL <70-recommend high dose statins-                       Serum glucose < 140      75

## 2024-12-18 NOTE — ED PROVIDER NOTE - PRIOR EKG STATUS
General Medicine Discharge Summary     Patient ID:  Mavis Ness  27year old  [id number]  3/19/1983    Admit date: 1/30/2018    Discharge date and time:  2/2/18    Attending Physician: Kody Suarez, *     Primary Care Physician: Evelyn Easley have limited effectiveness given above  -low haptoglobin, LDH up, fibrinogen low     CXR c possible PNA  -PCT mildly elevated  -sputum/blood cxs  -given hx, risk aspiration c hepatic encephalopathy, will cont empiric zoysn  -pulm following, appreciate    worsening bilateral pleural effusions.     Dictated by: Hitesh Higgins MD on 1/30/2018 at 14:14     Approved by: Hitesh Higgins MD            Us Abdomen Limited (IRX=70667)    Result Date: 1/31/2018  PROCEDURE:  US ABDOMEN LIMITED (CPT=76705)  LORI mildly prominent enlarged periaortic aortic caval lymph nodes are noted. A representative aortocaval lymph node measures 1.2 x 0.8 cm (image 66). BOWEL/MESENTERY:  There is diverticulosis of the colon without evidence of acute diverticulitis.  ABDOMINAL WA Small to moderate right pleural effusion and blunting left costophrenic angle is stable. Bibasilar consolidations are stable. No pneumothorax per  Cardiac silhouette is mildly prominent.       CONCLUSION:  No significant changes since prior exam.    Dicta by: Arianna Womack MD on 1/31/2018 at 10:15     Approved by: Arianna Womack MD            Us Abdomen Doppler Only (cpt=93975)    Result Date: 1/5/2018  PROCEDURE:  US ABDOMEN DOPPLER ONLY (CPT=93975)  INDICATIONS:  confirm TIPS patency  COMPARISON: mL with Pantoprazole Sodium 40 MG SOLR 40 mg  Inject 40 mg into the vein daily. CONTINUE these medications which have CHANGED    Midodrine HCl 10 MG Oral Tab  Take 1 tablet (10 mg total) by mouth 3 (three) times daily.       CONTINUE these medications the EKG is unchanged from prior EKG

## 2024-12-18 NOTE — ED PROVIDER NOTE - OBJECTIVE STATEMENT
55 y/o M PMH of ESRD (MWF via LUE AVF), HTN, HLD, CAD s/p PCI, DM2, HFrEF with recent cholecystectomy presenting with dyspnea    Patient underwent HD today- 1.5 L removed but states was dyspneic subsequently with chest tightness. Arrive to ER hypoxic to 86% on RA. No f/c. Patient notes weight gain since recent discharge approximately 6 kg. Follows with Dr. Griffiths nephrology. Notes some night sweats after discharge. 53 y/o M PMH of ESRD (MWF via LUE AVF), HTN, HLD, CAD s/p PCI, DM2, HFrEF with recent cholecystectomy presenting with dyspnea    Patient underwent HD today- 1.5 L removed but states was dyspneic subsequently with chest tightness. Arrive to ER hypoxic to 86% on RA. No f/c. Patient notes weight gain since recent discharge approximately 6 kg. Follows with Dr. Griffiths nephrology. Notes some night sweats after discharge. States makes little urine.

## 2024-12-18 NOTE — ED ADULT NURSE NOTE - NSFALLUNIVINTERV_ED_ALL_ED
Bed/Stretcher in lowest position, wheels locked, appropriate side rails in place/Call bell, personal items and telephone in reach/Instruct patient to call for assistance before getting out of bed/chair/stretcher/Non-slip footwear applied when patient is off stretcher/[place] to call system/Physically safe environment - no spills, clutter or unnecessary equipment/Purposeful proactive rounding/Room/bathroom lighting operational, light cord in reach

## 2024-12-18 NOTE — ED PROVIDER NOTE - PROGRESS NOTE DETAILS
d/w Cardiology fellow Dr. Chambers, at this time patient with no active chest pain, troponin leak likely type II demand, however can start heparin until formal cardiology evaluation in AM. d/w Dr. Butler

## 2024-12-18 NOTE — ED PROVIDER NOTE - CLINICAL SUMMARY MEDICAL DECISION MAKING FREE TEXT BOX
55 y/o M PMH of ESRD (MWF via LUE AVF), HTN, HLD, CAD s/p PCI, DM2, HFrEF with recent cholecystectomy presenting with dyspnea  Hypoxemic on room air, quickly improved with nasal cannula.   Appears volume overloaded- pulmonary edema, weight gain, pitting LE edema.   Nitro to reduce preload, Bumetanide  EKG unchanged LAFB  Admit for UF in AM  Stable on NC. 55 y/o M PMH of ESRD (MWF via LUE AVF), HTN, HLD, CAD s/p PCI, DM2, HFrEF with recent cholecystectomy presenting with dyspnea  Hypoxemic on room air, quickly improved with nasal cannula.   Appears volume overloaded- pulmonary edema, weight gain, pitting LE edema.   Nitro to reduce preload, Bumetanide  EKG unchanged LAFB  Admit for UF in AM  Stable on NC.    Found to have elevated troponin suspect from demand ischemia/CHF exacerbation. No active cp. d/w Cardiology at [facility]-->no urgent indication for catheterization. Started on Heparin infusion, though likely Demand Type II. ASA given. Plan for UF in AM.

## 2024-12-18 NOTE — ED PROVIDER NOTE - CRITICAL CARE ATTENDING CONTRIBUTION TO CARE
NSTEMI vs. Demand Ischemia  Acute Hypoxemic Respiratory Failure  Acute Pulmonary Edema  Acute CHF exacerbation   ESRD  CAD s/p PCI    The patient's condition is critical. Management options were put in place to ensure further deterioration does not occur. In addition to the usual care provided, I have spent additional time with this patient through but not limited to the following: additional documentation  reviewing test results,  discussing with consultants,  discussing with patient / patient's family prognosis and course of care,  reassessment of patient's status and response to interventions.

## 2024-12-19 ENCOUNTER — APPOINTMENT (OUTPATIENT)
Dept: SURGERY | Facility: CLINIC | Age: 54
End: 2024-12-19

## 2024-12-19 DIAGNOSIS — I21.4 NON-ST ELEVATION (NSTEMI) MYOCARDIAL INFARCTION: ICD-10-CM

## 2024-12-19 DIAGNOSIS — D64.9 ANEMIA, UNSPECIFIED: ICD-10-CM

## 2024-12-19 DIAGNOSIS — I10 ESSENTIAL (PRIMARY) HYPERTENSION: ICD-10-CM

## 2024-12-19 DIAGNOSIS — I25.10 ATHEROSCLEROTIC HEART DISEASE OF NATIVE CORONARY ARTERY WITHOUT ANGINA PECTORIS: ICD-10-CM

## 2024-12-19 DIAGNOSIS — R18.8 OTHER ASCITES: ICD-10-CM

## 2024-12-19 DIAGNOSIS — Z29.9 ENCOUNTER FOR PROPHYLACTIC MEASURES, UNSPECIFIED: ICD-10-CM

## 2024-12-19 DIAGNOSIS — J96.01 ACUTE RESPIRATORY FAILURE WITH HYPOXIA: ICD-10-CM

## 2024-12-19 DIAGNOSIS — N18.6 END STAGE RENAL DISEASE: ICD-10-CM

## 2024-12-19 DIAGNOSIS — E78.5 HYPERLIPIDEMIA, UNSPECIFIED: ICD-10-CM

## 2024-12-19 DIAGNOSIS — I50.9 HEART FAILURE, UNSPECIFIED: ICD-10-CM

## 2024-12-19 DIAGNOSIS — R79.89 OTHER SPECIFIED ABNORMAL FINDINGS OF BLOOD CHEMISTRY: ICD-10-CM

## 2024-12-19 DIAGNOSIS — Z90.49 ACQUIRED ABSENCE OF OTHER SPECIFIED PARTS OF DIGESTIVE TRACT: Chronic | ICD-10-CM

## 2024-12-19 LAB
ALBUMIN SERPL ELPH-MCNC: 2.5 G/DL — LOW (ref 3.5–5)
ALP SERPL-CCNC: 107 U/L — SIGNIFICANT CHANGE UP (ref 40–120)
ALT FLD-CCNC: 15 U/L DA — SIGNIFICANT CHANGE UP (ref 10–60)
ANION GAP SERPL CALC-SCNC: 6 MMOL/L — SIGNIFICANT CHANGE UP (ref 5–17)
APTT BLD: 32.3 SEC — SIGNIFICANT CHANGE UP (ref 24.5–35.6)
APTT BLD: 36.7 SEC — HIGH (ref 24.5–35.6)
APTT BLD: 37.6 SEC — HIGH (ref 24.5–35.6)
AST SERPL-CCNC: 23 U/L — SIGNIFICANT CHANGE UP (ref 10–40)
BASOPHILS # BLD AUTO: 0.05 K/UL — SIGNIFICANT CHANGE UP (ref 0–0.2)
BASOPHILS NFR BLD AUTO: 0.6 % — SIGNIFICANT CHANGE UP (ref 0–2)
BILIRUB SERPL-MCNC: 0.5 MG/DL — SIGNIFICANT CHANGE UP (ref 0.2–1.2)
BUN SERPL-MCNC: 36 MG/DL — HIGH (ref 7–18)
CALCIUM SERPL-MCNC: 8.2 MG/DL — LOW (ref 8.4–10.5)
CHLORIDE SERPL-SCNC: 100 MMOL/L — SIGNIFICANT CHANGE UP (ref 96–108)
CO2 SERPL-SCNC: 29 MMOL/L — SIGNIFICANT CHANGE UP (ref 22–31)
CREAT SERPL-MCNC: 4.77 MG/DL — HIGH (ref 0.5–1.3)
EGFR: 14 ML/MIN/1.73M2 — LOW
EOSINOPHIL # BLD AUTO: 0.66 K/UL — HIGH (ref 0–0.5)
EOSINOPHIL NFR BLD AUTO: 8.5 % — HIGH (ref 0–6)
GLUCOSE SERPL-MCNC: 131 MG/DL — HIGH (ref 70–99)
HCT VFR BLD CALC: 22.3 % — LOW (ref 39–50)
HCT VFR BLD CALC: 22.7 % — LOW (ref 39–50)
HGB BLD-MCNC: 7.8 G/DL — LOW (ref 13–17)
HGB BLD-MCNC: 7.8 G/DL — LOW (ref 13–17)
IMM GRANULOCYTES NFR BLD AUTO: 0.5 % — SIGNIFICANT CHANGE UP (ref 0–0.9)
INR BLD: 1.27 RATIO — HIGH (ref 0.85–1.16)
LYMPHOCYTES # BLD AUTO: 0.55 K/UL — LOW (ref 1–3.3)
LYMPHOCYTES # BLD AUTO: 7 % — LOW (ref 13–44)
MAGNESIUM SERPL-MCNC: 2 MG/DL — SIGNIFICANT CHANGE UP (ref 1.6–2.6)
MCHC RBC-ENTMCNC: 32.8 PG — SIGNIFICANT CHANGE UP (ref 27–34)
MCHC RBC-ENTMCNC: 33.5 PG — SIGNIFICANT CHANGE UP (ref 27–34)
MCHC RBC-ENTMCNC: 34.4 G/DL — SIGNIFICANT CHANGE UP (ref 32–36)
MCHC RBC-ENTMCNC: 35 G/DL — SIGNIFICANT CHANGE UP (ref 32–36)
MCV RBC AUTO: 95.4 FL — SIGNIFICANT CHANGE UP (ref 80–100)
MCV RBC AUTO: 95.7 FL — SIGNIFICANT CHANGE UP (ref 80–100)
MONOCYTES # BLD AUTO: 0.62 K/UL — SIGNIFICANT CHANGE UP (ref 0–0.9)
MONOCYTES NFR BLD AUTO: 7.9 % — SIGNIFICANT CHANGE UP (ref 2–14)
NEUTROPHILS # BLD AUTO: 5.89 K/UL — SIGNIFICANT CHANGE UP (ref 1.8–7.4)
NEUTROPHILS NFR BLD AUTO: 75.5 % — SIGNIFICANT CHANGE UP (ref 43–77)
NRBC # BLD: 0 /100 WBCS — SIGNIFICANT CHANGE UP (ref 0–0)
NRBC # BLD: 0 /100 WBCS — SIGNIFICANT CHANGE UP (ref 0–0)
NT-PROBNP SERPL-SCNC: HIGH PG/ML (ref 0–125)
PHOSPHATE SERPL-MCNC: 3.5 MG/DL — SIGNIFICANT CHANGE UP (ref 2.5–4.5)
PLATELET # BLD AUTO: 192 K/UL — SIGNIFICANT CHANGE UP (ref 150–400)
PLATELET # BLD AUTO: 196 K/UL — SIGNIFICANT CHANGE UP (ref 150–400)
POTASSIUM SERPL-MCNC: 4.5 MMOL/L — SIGNIFICANT CHANGE UP (ref 3.5–5.3)
POTASSIUM SERPL-SCNC: 4.5 MMOL/L — SIGNIFICANT CHANGE UP (ref 3.5–5.3)
PROT SERPL-MCNC: 6.6 G/DL — SIGNIFICANT CHANGE UP (ref 6–8.3)
PROTHROM AB SERPL-ACNC: 14.8 SEC — HIGH (ref 9.9–13.4)
RBC # BLD: 2.33 M/UL — LOW (ref 4.2–5.8)
RBC # BLD: 2.38 M/UL — LOW (ref 4.2–5.8)
RBC # FLD: 14.2 % — SIGNIFICANT CHANGE UP (ref 10.3–14.5)
RBC # FLD: 14.2 % — SIGNIFICANT CHANGE UP (ref 10.3–14.5)
SODIUM SERPL-SCNC: 135 MMOL/L — SIGNIFICANT CHANGE UP (ref 135–145)
TROPONIN I, HIGH SENSITIVITY RESULT: 2377.2 NG/L — HIGH
TROPONIN I, HIGH SENSITIVITY RESULT: 2822.9 NG/L — HIGH
WBC # BLD: 7.81 K/UL — SIGNIFICANT CHANGE UP (ref 3.8–10.5)
WBC # BLD: 8.05 K/UL — SIGNIFICANT CHANGE UP (ref 3.8–10.5)
WBC # FLD AUTO: 7.81 K/UL — SIGNIFICANT CHANGE UP (ref 3.8–10.5)
WBC # FLD AUTO: 8.05 K/UL — SIGNIFICANT CHANGE UP (ref 3.8–10.5)

## 2024-12-19 PROCEDURE — 99223 1ST HOSP IP/OBS HIGH 75: CPT

## 2024-12-19 PROCEDURE — 99233 SBSQ HOSP IP/OBS HIGH 50: CPT

## 2024-12-19 RX ORDER — CLOPIDOGREL BISULFATE 75 MG/1
75 TABLET, FILM COATED ORAL DAILY
Refills: 0 | Status: DISCONTINUED | OUTPATIENT
Start: 2024-12-19 | End: 2024-12-23

## 2024-12-19 RX ORDER — HEPARIN SODIUM 1000 [USP'U]/ML
INJECTION, SOLUTION INTRAVENOUS; SUBCUTANEOUS
Qty: 25000 | Refills: 0 | Status: DISCONTINUED | OUTPATIENT
Start: 2024-12-19 | End: 2024-12-20

## 2024-12-19 RX ORDER — ACETAMINOPHEN 80 MG/.8ML
650 SOLUTION/ DROPS ORAL EVERY 6 HOURS
Refills: 0 | Status: DISCONTINUED | OUTPATIENT
Start: 2024-12-19 | End: 2024-12-23

## 2024-12-19 RX ORDER — HEPARIN SODIUM 1000 [USP'U]/ML
5300 INJECTION, SOLUTION INTRAVENOUS; SUBCUTANEOUS EVERY 6 HOURS
Refills: 0 | Status: DISCONTINUED | OUTPATIENT
Start: 2024-12-19 | End: 2024-12-19

## 2024-12-19 RX ORDER — HEPARIN SODIUM 1000 [USP'U]/ML
INJECTION, SOLUTION INTRAVENOUS; SUBCUTANEOUS
Qty: 25000 | Refills: 0 | Status: DISCONTINUED | OUTPATIENT
Start: 2024-12-19 | End: 2024-12-19

## 2024-12-19 RX ORDER — PANTOPRAZOLE 40 MG/1
40 TABLET, DELAYED RELEASE ORAL
Refills: 0 | Status: DISCONTINUED | OUTPATIENT
Start: 2024-12-19 | End: 2024-12-23

## 2024-12-19 RX ORDER — HEPARIN SODIUM 1000 [USP'U]/ML
5300 INJECTION, SOLUTION INTRAVENOUS; SUBCUTANEOUS EVERY 6 HOURS
Refills: 0 | Status: DISCONTINUED | OUTPATIENT
Start: 2024-12-19 | End: 2024-12-20

## 2024-12-19 RX ORDER — TRAZODONE HYDROCHLORIDE 150 MG/1
100 TABLET ORAL AT BEDTIME
Refills: 0 | Status: DISCONTINUED | OUTPATIENT
Start: 2024-12-19 | End: 2024-12-23

## 2024-12-19 RX ORDER — CALCIUM ACETATE 667 MG/1
667 CAPSULE ORAL
Refills: 0 | Status: DISCONTINUED | OUTPATIENT
Start: 2024-12-19 | End: 2024-12-23

## 2024-12-19 RX ORDER — ASPIRIN 81 MG
81 TABLET, DELAYED RELEASE (ENTERIC COATED) ORAL DAILY
Refills: 0 | Status: DISCONTINUED | OUTPATIENT
Start: 2024-12-19 | End: 2024-12-23

## 2024-12-19 RX ORDER — ATORVASTATIN CALCIUM 40 MG/1
10 TABLET, FILM COATED ORAL AT BEDTIME
Refills: 0 | Status: DISCONTINUED | OUTPATIENT
Start: 2024-12-19 | End: 2024-12-23

## 2024-12-19 RX ORDER — LISINOPRIL 20 MG/1
1 TABLET ORAL
Refills: 0 | DISCHARGE

## 2024-12-19 RX ORDER — LISINOPRIL 30 MG/1
40 TABLET ORAL DAILY
Refills: 0 | Status: DISCONTINUED | OUTPATIENT
Start: 2024-12-19 | End: 2024-12-23

## 2024-12-19 RX ORDER — HEPARIN SODIUM 1000 [USP'U]/ML
5000 INJECTION, SOLUTION INTRAVENOUS; SUBCUTANEOUS ONCE
Refills: 0 | Status: DISCONTINUED | OUTPATIENT
Start: 2024-12-19 | End: 2024-12-19

## 2024-12-19 RX ORDER — CARVEDILOL 25 MG/1
12.5 TABLET, FILM COATED ORAL EVERY 12 HOURS
Refills: 0 | Status: DISCONTINUED | OUTPATIENT
Start: 2024-12-19 | End: 2024-12-23

## 2024-12-19 RX ORDER — EPOETIN ALFA 2000 [IU]/ML
10000 SOLUTION INTRAVENOUS; SUBCUTANEOUS
Refills: 0 | Status: DISCONTINUED | OUTPATIENT
Start: 2024-12-19 | End: 2024-12-23

## 2024-12-19 RX ORDER — HEPARIN SODIUM 1000 [USP'U]/ML
5000 INJECTION, SOLUTION INTRAVENOUS; SUBCUTANEOUS ONCE
Refills: 0 | Status: COMPLETED | OUTPATIENT
Start: 2024-12-19 | End: 2024-12-19

## 2024-12-19 RX ORDER — HYDRALAZINE HYDROCHLORIDE 10 MG/1
25 TABLET ORAL
Refills: 0 | Status: DISCONTINUED | OUTPATIENT
Start: 2024-12-19 | End: 2024-12-19

## 2024-12-19 RX ADMIN — ATORVASTATIN CALCIUM 10 MILLIGRAM(S): 40 TABLET, FILM COATED ORAL at 21:17

## 2024-12-19 RX ADMIN — CALCIUM ACETATE 667 MILLIGRAM(S): 667 CAPSULE ORAL at 17:40

## 2024-12-19 RX ADMIN — TRAZODONE HYDROCHLORIDE 100 MILLIGRAM(S): 150 TABLET ORAL at 21:17

## 2024-12-19 RX ADMIN — HEPARIN SODIUM 5000 UNIT(S): 1000 INJECTION, SOLUTION INTRAVENOUS; SUBCUTANEOUS at 09:11

## 2024-12-19 RX ADMIN — HEPARIN SODIUM 5300 UNIT(S): 1000 INJECTION, SOLUTION INTRAVENOUS; SUBCUTANEOUS at 15:23

## 2024-12-19 RX ADMIN — HEPARIN SODIUM 1250 UNIT(S)/HR: 1000 INJECTION, SOLUTION INTRAVENOUS; SUBCUTANEOUS at 15:20

## 2024-12-19 RX ADMIN — PANTOPRAZOLE 40 MILLIGRAM(S): 40 TABLET, DELAYED RELEASE ORAL at 06:38

## 2024-12-19 RX ADMIN — Medication 81 MILLIGRAM(S): at 15:03

## 2024-12-19 RX ADMIN — HEPARIN SODIUM 1250 UNIT(S)/HR: 1000 INJECTION, SOLUTION INTRAVENOUS; SUBCUTANEOUS at 19:09

## 2024-12-19 RX ADMIN — HEPARIN SODIUM 1500 UNIT(S)/HR: 1000 INJECTION, SOLUTION INTRAVENOUS; SUBCUTANEOUS at 23:02

## 2024-12-19 RX ADMIN — CLOPIDOGREL BISULFATE 75 MILLIGRAM(S): 75 TABLET, FILM COATED ORAL at 15:03

## 2024-12-19 RX ADMIN — CARVEDILOL 12.5 MILLIGRAM(S): 25 TABLET, FILM COATED ORAL at 17:40

## 2024-12-19 RX ADMIN — CALCIUM ACETATE 667 MILLIGRAM(S): 667 CAPSULE ORAL at 08:54

## 2024-12-19 RX ADMIN — HEPARIN SODIUM 1000 UNIT(S)/HR: 1000 INJECTION, SOLUTION INTRAVENOUS; SUBCUTANEOUS at 12:00

## 2024-12-19 RX ADMIN — HEPARIN SODIUM 1000 UNIT(S)/HR: 1000 INJECTION, SOLUTION INTRAVENOUS; SUBCUTANEOUS at 09:11

## 2024-12-19 NOTE — PROGRESS NOTE ADULT - PROBLEM SELECTOR PLAN 5
on Coreg, hydralazine and lisinopril at home  c/w home meds with holding parameters ESRD on HD MWF  HD on wednesday; removed 1.5L fluid  CXR fluid overload  makes minimal urine  Nephrology Dr Griffiths consulted    -HD today  -c/w calcium acetate

## 2024-12-19 NOTE — PROGRESS NOTE ADULT - PROBLEM SELECTOR PLAN 4
ESRD on HD MWF  HD on wednesday; removed 1.5L fluid  CXR fluid overload  makes minimal urine    -possible HD today  -c/w calcium acetate  -Nephrology Dr Griffiths consulted Baseline 10-11  Hb after cholecystectomy in Nov: 9.3  On this admission 8.4 > 7.8  iron panel: ACD    -monitor CBC  -f/u Hb 2pm

## 2024-12-19 NOTE — H&P ADULT - PROBLEM SELECTOR PLAN 3
p/w trop 1.7k>2K  EKG: left ant fascicular block, no new changes  pt c/o central chest pain; reproducible  less likely ACS; likely demand    - holding off on heparin drip until formal Cardio recs  -Cardiology Dr Quick consulted plan as above

## 2024-12-19 NOTE — CONSULT NOTE ADULT - TIME BILLING
- Review of records, telemetry, vital signs and daily labs.   - General and cardiovascular physical examination.  - Generation of cardiovascular treatment plan and completion of note .  - Coordination of care.      Patient was seen and examined by me on 12/19/24 ,interim events noted,labs and radiology studies reviewed.  Solo Quick MD,FACC.  [address] [place], [place] [id number].  [phone]  Availabe to call or text on [employer] TEAMS.

## 2024-12-19 NOTE — PROGRESS NOTE ADULT - SUBJECTIVE AND OBJECTIVE BOX
SUBJECTIVE: **TO UPDATE**  No overnight events. Pt seen at bedside, states that they feel "__________." Pt endorses _____. Pt denies headache, fever, chills, SOB, chest pain, cough, abd pain, n/v/d, constipation, dysuria, urinary frequency, back pain, myalgias, weakness, dizziness, gait disturbance, numbness/tingling, blurry vision.      OBJECTIVE:    T(C): 36.5 (12-19-24 @ 08:51), Max: 37.3 (12-18-24 @ 23:53)  HR: 70 (12-19-24 @ 08:51) (70 - 84)  BP: 116/68 (12-19-24 @ 08:51) (100/61 - 136/72)  RR: 20 (12-19-24 @ 08:51) (16 - 22)  SpO2: 97% (12-19-24 @ 08:51) (86% - 100%)        12-19-24 @ 07:01  -  12-19-24 @ 09:43  --------------------------------------------------------  IN: 240 mL / OUT: 0 mL / NET: 240 mL          ***TO BE EDITED***  CONSTITUTIONAL: No apparent distress, resting comfortably  EYES: Pupils symmetric, EOMI, No conjunctival or scleral injection, non-icteric  ENMT: Oral mucosa with moist membranes  RESP: No respiratory distress, no use of accessory muscles; CTA b/l, no crackles or wheezes  CV: RRR, +S1S2, no murmurs appreciated; no peripheral edema  GI: Soft, NTND, no rebound, no guarding  MSK: No digital clubbing or cyanosis; Extremities moving equally without additional effort; gait not appreciated  : deferred  SKIN: No rashes or ulcers noted  NEURO: CN II-XII grossly intact   PSYCH: Appropriate insight/judgment; A+O x 3, mood and affect appropriate, recent/remote memory intact    No Known Allergies      acetaminophen     Tablet .. 650 milliGRAM(s) Oral every 6 hours PRN  atorvastatin 10 milliGRAM(s) Oral at bedtime  calcium acetate 667 milliGRAM(s) Oral three times a day with meals  carvedilol 12.5 milliGRAM(s) Oral every 12 hours  heparin   Injectable 5300 Unit(s) IV Push every 6 hours PRN  heparin  Infusion.  Unit(s)/Hr (10 mL/Hr) IV Continuous <Continuous>  hydrALAZINE 25 milliGRAM(s) Oral two times a day  lisinopril 40 milliGRAM(s) Oral daily  pantoprazole    Tablet 40 milliGRAM(s) Oral before breakfast  traZODone 100 milliGRAM(s) Oral at bedtime                            7.8    7.81  )-----------( 196      ( 19 Dec 2024 05:00 )             22.7     12-19    135  |  100  |  36[H]  ----------------------------<  131[H]  4.5   |  29  |  4.77[H]    Ca    8.2[L]      19 Dec 2024 05:00  Phos  3.5     12-19  Mg     2.0     12-19    TPro  6.6  /  Alb  2.5[L]  /  TBili  0.5  /  DBili  x   /  AST  23  /  ALT  15  /  AlkPhos  107  12-19             SUBJECTIVE: No overnight events. Pt seen at bedside, states that they feel "compared to yesterday, a lot better. I no longer feel like I'm drowning." Pt endorses resolution of chest pain, dyspnea. Pt endorses continuation of chest pressure/restrictive sensation causing him to breathe shallowly, endorses exercise intolerance. Pt denies headache, fever, chills, chest pain, cough, abd pain, n/v, back pain, myalgias, dizziness, gait disturbance, numbness/tingling, blurry vision.      OBJECTIVE:    T(C): 36.5 (12-19-24 @ 08:51), Max: 37.3 (12-18-24 @ 23:53)  HR: 70 (12-19-24 @ 08:51) (70 - 84)  BP: 116/68 (12-19-24 @ 08:51) (100/61 - 136/72)  RR: 20 (12-19-24 @ 08:51) (16 - 22)  SpO2: 97% (12-19-24 @ 08:51) (86% - 100%)        12-19-24 @ 07:01  -  12-19-24 @ 09:43  --------------------------------------------------------  IN: 240 mL / OUT: 0 mL / NET: 240 mL        CONSTITUTIONAL: No apparent distress, resting comfortably  EYES: Pupils symmetric, EOMI, No conjunctival or scleral injection, non-icteric  ENMT: Oral mucosa with moist membranes  RESP: 4L NC. No respiratory distress, no use of accessory muscles; CTA b/l, no crackles or wheezes  CV: RRR, +S1S2, +systolic murmur; +1 b/l nonpitting edema  GI: Soft, NTND, no rebound, no guarding  MSK: No digital clubbing or cyanosis; Extremities moving equally without additional effort; gait not appreciated  : deferred  SKIN: No rashes or ulcers noted  NEURO: CN II-XII grossly intact   PSYCH: Appropriate insight/judgment; A+O x 3, mood and affect appropriate, recent/remote memory intact    No Known Allergies      acetaminophen     Tablet .. 650 milliGRAM(s) Oral every 6 hours PRN  atorvastatin 10 milliGRAM(s) Oral at bedtime  calcium acetate 667 milliGRAM(s) Oral three times a day with meals  carvedilol 12.5 milliGRAM(s) Oral every 12 hours  heparin   Injectable 5300 Unit(s) IV Push every 6 hours PRN  heparin  Infusion.  Unit(s)/Hr (10 mL/Hr) IV Continuous <Continuous>  hydrALAZINE 25 milliGRAM(s) Oral two times a day  lisinopril 40 milliGRAM(s) Oral daily  pantoprazole    Tablet 40 milliGRAM(s) Oral before breakfast  traZODone 100 milliGRAM(s) Oral at bedtime                            7.8    7.81  )-----------( 196      ( 19 Dec 2024 05:00 )             22.7     12-19    135  |  100  |  36[H]  ----------------------------<  131[H]  4.5   |  29  |  4.77[H]    Ca    8.2[L]      19 Dec 2024 05:00  Phos  3.5     12-19  Mg     2.0     12-19    TPro  6.6  /  Alb  2.5[L]  /  TBili  0.5  /  DBili  x   /  AST  23  /  ALT  15  /  AlkPhos  107  12-19      < from: 12 Lead ECG (12.18.24 @ 23:47) >    Ventricular Rate 76 BPM    Atrial Rate 76 BPM    P-R Interval 198 ms    QRS Duration 108 ms    Q-T Interval 426 ms    QTC Calculation(Bazett) 479 ms    P Axis 18 degrees    R Axis -58 degrees    T Axis 120 degrees    Diagnosis Line Normal sinus rhythm  Left anterior fascicular block  Anterior infarct , age undetermined  ST & T wave abnormality, consider lateral ischemia  Abnormal ECG    Confirmed by JOSE ANTONIO DIXON MD ([id number]) on 12/19/2024 1:17:24 PM    < end of copied text >  Iron with Total Binding Capacity (12.19.24 @ 05:00)   Iron - Total Binding Capacity.: 143 ug/dL  % Saturation, Iron: 20 %  Iron Total: 29 ug/dL  Unsaturated Iron Binding Capacity: 114 ug/dLPro-Brain Natriuretic Peptide (12.19.24 @ 05:00)   Pro-Brain Natriuretic Peptide: >[id number] I, High Sensitivity (12.19.24 @ 05:00)   Troponin I, High Sensitivity Result: 2822.9Troponin I, High Sensitivity (12.18.24 @ 22:24)   Troponin I, High Sensitivity Result: 2078.4Troponin I, High Sensitivity (12.18.24 @ 20:23)   Troponin I, High Sensitivity Result: 1719.9< from: Xray Chest 1 View- PORTABLE-Urgent (Xray Chest 1 View- PORTABLE-Urgent .) (12.18.24 @ 23:02) >  IMPRESSION:  Constellation of findings concerning for congestive heart failure.    --- End of Report ---      < end of copied text >

## 2024-12-19 NOTE — H&P ADULT - NSHPREVIEWOFSYSTEMS_GEN_ALL_CORE
- CONSTITUTIONAL: Denies fever and chills  - HEENT: Denies changes in vision and hearing.  - RESPIRATORY: +SOB   - CV: + central chest pain; denies palpitation  - GI: Denies abdominal pain, nausea, vomiting and diarrhea.  - SKIN: Denies rash and pruritus.  - NEUROLOGICAL: Denies headache and syncope.

## 2024-12-19 NOTE — PROGRESS NOTE ADULT - ASSESSMENT
1. ESRD on HD (M/W/F)  -plan for UF today with 2.0kg for fluid removal and plan for his schedule HD tomorrow and HD orders were written and discussed with dialysis nurse.   -Hemodialysis Renal Diet and Fluid restriction to 1L/day  -Adjust meds to eGFR and avoid IV Gadolinium contrast,NSAIDs, and phosphate enema.  -Monitor Electrolytes daily.  2. HTN:   -bp is low normal; d/c hydralazine; pt takes nifedipine 90mg daily but will hold for now.   and continue coreg 12.5mg bid and lisinopril 40mg daiy.   -titrate bp meds to keep sbp >110 and < 130  3. Anemia of ESRD:  -order Epogen 79987 tiw.  -F/u CBC daily  -transfuse if HB < 7.0.  4. Mineral Bone Disease:  -continue phoslo tid.   -monitor phos  5. Sob due to fluid overload with h/o HFrEF (35%)  -UF during HD  -monitor daily weight.     d/w patient.

## 2024-12-19 NOTE — H&P ADULT - PROBLEM SELECTOR PLAN 4
ESRD on HD MWF  HD on wednesday; removed 1.5L fluid  CXR fluid overload  makes minimal urine    -possible HD today  -c/w calcium acetate  -Nephrology Dr Griffiths consulted

## 2024-12-19 NOTE — PROGRESS NOTE ADULT - ASSESSMENT
54M with PMH HTN. HLD, ESRD on HD MWF via LUE AVF, ascites (requiring repeat paracenteses q4-6wks), NICM with moderate LV dysfunction and CAD NSTEMI 4/7/2024 s/p atherectomy, IVL, SALLIE, intervention of LAD branch D1 (PTCA atherectomy, atherectomy and SALLIE on 4/11/2024), recently admitted from 09/27-12/02 for acute cholecystitis s/p cholecystectomy presented with shortness of breath for 1day. In ED, pt was hypoxic to 86% on RA, trop 1.7K, EKG no new changes, CXR fluid overload. Admitted for AHRF 2/2 fluid overload. 54M with PMH HTN. HLD, ESRD on HD MWF via LUE AVF, ascites (requiring repeat paracenteses q4-6wks), NICM with moderate LV dysfunction and CAD NSTEMI 4/7/2024 s/p atherectomy, IVL, SALLIE, intervention of LAD branch D1 (PTCA atherectomy, atherectomy and SALLIE on 4/11/2024), recently admitted from 09/27-12/02 for acute cholecystitis s/p cholecystectomy presented with shortness of breath for 1day. In ED, pt was hypoxic to 86% on RA, trop 1.7K, EKG no new changes, CXR fluid overload. Admitted for AHRF 2/2 fluid overload. DAPT resumed, trending trops, pending cards recs, holding diuretics iso low urine production.

## 2024-12-19 NOTE — H&P ADULT - NSICDXPASTSURGICALHX_GEN_ALL_CORE_FT
PAST SURGICAL HISTORY:  AV fistula     H/O right wrist surgery tendons repair - at age 14    H/O ventral hernia repair     History of vascular access device right chest permacath - 2/2019    S/P arthroscopy of right shoulder 2010    S/P cholecystectomy

## 2024-12-19 NOTE — CONSULT NOTE ADULT - ASSESSMENT
54M with PMH HTN. HLD, ESRD on HD MWF via LUE AVF, ascites (requiring repeat paracenteses q4-6wks), NICM with moderate LV dysfunction and CAD NSTEMI 4/7/2024 s/p atherectomy, IVL, SALLIE, intervention of LAD branch D1 (PTCA atherectomy, atherectomy and SALLIE on 4/11/2024), recently admitted from 09/27-12/02 for acute cholecystitis s/p cholecystectomy presented with shortness of breath for 1day. In ED, pt was hypoxic to 86% on RA, trop 1.7K, EKG no new changes, CXR fluid overload. Admitted for AHRF 2/2 fluid overload.    # NSTEMI (non-ST elevation myocardial infarction).   # HF  # ESRD  # CAD s/p stent X3 on april, 24  # HLD  EKG: left ant fascicular block, no new changes  pt c/o central chest pain; reproducible    -started Heparin drip  - f/u TTE   - renal f/u   -  Coreg, hydralazine and lisinopril at home  - statin   
1. ESRD on HD (M/W/F)  -plan for UF today with 2.0kg for fluid removal and plan for his schedule HD tomorrow and HD orders were written and discussed with dialysis nurse.   -Hemodialysis Renal Diet and Fluid restriction to 1L/day  -Adjust meds to eGFR and avoid IV Gadolinium contrast,NSAIDs, and phosphate enema.  -Monitor Electrolytes daily.  2. HTN:   -bp is low normal; d/c hydralazine; pt takes nifedipine 90mg daily but will hold for now.   and continue coreg 12.5mg bid and lisinopril 40mg daiy.   -titrate bp meds to keep sbp >110 and < 130  3. Anemia of ESRD:  -order Epogen 17974 tiw.  -F/u CBC daily  -transfuse if HB < 7.0.  4. Mineral Bone Disease:  -continue phoslo tid.   -monitor phos  5. Sob due to fluid overload with h/o HFrEF (35%)  -UF during HD  -monitor daily weight.     d/w patient.

## 2024-12-19 NOTE — H&P ADULT - PROBLEM SELECTOR PLAN 6
has h/o recurrent ascites requiring routine para  likely cardiac in origin  last para on last admission to [facility] with cholecystectomy  PITA drain removed last week; was having drainage from the tract; not anymore  pt reports gradual distention  PE: soft, non distended, non tender

## 2024-12-19 NOTE — H&P ADULT - NSHPPHYSICALEXAM_GEN_ALL_CORE
T(C): 37.3 (12-18-24 @ 23:53), Max: 37.3 (12-18-24 @ 23:53)  HR: 76 (12-18-24 @ 23:53) (76 - 84)  BP: 102/55 (12-18-24 @ 23:53) (102/55 - 136/72)  RR: 20 (12-18-24 @ 23:53) (16 - 20)  SpO2: 91% (12-18-24 @ 23:53) (86% - 96%)      GENERAL: NAD, speaks in full sentences, no signs of respiratory distress  HEAD:  Atraumatic, Normocephalic  EYES: EOMI, PERRLA, conjunctiva and sclera clear  NECK: Supple, No JVD  CHEST/LUNG: Clear to auscultation bilaterally; No wheeze; No crackles; No accessory muscles used  HEART: Regular rate and rhythm; No murmurs;   ABDOMEN: Soft, Nontender, Nondistended; Bowel sounds present; No guarding  EXTREMITIES:  2+ Peripheral Pulses, No cyanosis or edema  PSYCH: AAOx3  NEUROLOGY: non-focal  SKIN: No rashes or lesions T(C): 37.3 (12-18-24 @ 23:53), Max: 37.3 (12-18-24 @ 23:53)  HR: 76 (12-18-24 @ 23:53) (76 - 84)  BP: 102/55 (12-18-24 @ 23:53) (102/55 - 136/72)  RR: 20 (12-18-24 @ 23:53) (16 - 20)  SpO2: 91% (12-18-24 @ 23:53) (86% - 96%)      GENERAL: speaks in full sentences, sitting upright in bed  HEAD:  Atraumatic, Normocephalic  EYES: EOMI, PERRLA, conjunctiva and sclera clear  NECK: Supple, No JVD  CHEST/LUNG: B/l mild basilar crackles; No wheeze;  No accessory muscles used  HEART: Regular rate and rhythm; No murmurs;   ABDOMEN: Multiple linear scar present from cholecystectomy, small open wound present on right side from PITA drain  Soft, Nontender, Nondistended; Bowel sounds present  EXTREMITIES: B/l 2+ pitting edema present;  2+ Peripheral Pulses, No cyanosis or edema  PSYCH: AAOx3  NEUROLOGY: No motor or sesnsory deficit  SKIN: No rashes or lesions

## 2024-12-19 NOTE — PATIENT PROFILE ADULT - FALL HARM RISK - HARM RISK INTERVENTIONS

## 2024-12-19 NOTE — PROGRESS NOTE ADULT - ATTENDING COMMENTS
54M CAD NSTEMI s/p PCI 4/11/24, ESRD HD MWF, HFrEF, recurrent ascites, s/p cholecystectomy p/w cc SOB and reproducible CP, found with troponemia and BNP elevation, admission for NSTEMI/ACS r/o, fluid overload.    AP:  AHRF  NSTEMI/ACS r/o  Fluid overload vs HFrEF exacerbation  ESRD HD MWF  recurrent ascites  s/p cholecystectomy  HTN    -discussed with cards Dr Quick: covering for ACS     -started hep gtt, DAPT, prn nitroglycerin    -obtain TTE, possible nuc stress tomorrow based on results    -trend trops  -ekg without lateral twi and poor R wave progression but unchanged from previous  -HD per nephro for fluid optimization     -EPO  -c/w PPI while on triple therapy  -BP well controlled, continue home meds    =================================  I independently reviewed the following tests: ekg without lateral twi and poor R wave progression but unchanged from previous  I discussed management with specialists and the plan of care is described above.  I ordered labs and studies: CBC, BMP  I reviewed the following labs to guide my management:                        7.8    8.05  )-----------( 192      ( 19 Dec 2024 14:50 )             22.3     12-19    135  |  100  |  36[H]  ----------------------------<  131[H]  4.5   |  29  |  4.77[H]    Ca    8.2[L]      19 Dec 2024 05:00  Phos  3.5     12-19  Mg     2.0     12-19    TPro  6.6  /  Alb  2.5[L]  /  TBili  0.5  /  DBili  x   /  AST  23  /  ALT  15  /  AlkPhos  107  12-19

## 2024-12-19 NOTE — H&P ADULT - ASSESSMENT
54M with PMH HTN. HLD, ESRD on HD MWF via LUE AVF, ascites (requiring repeat paracenteses q4-6wks), NICM with moderate LV dysfunction and CAD NSTEMI 4/7/2024 s/p atherectomy, IVL, SALLIE, intervention of LAD branch D1 (PTCA atherectomy, atherectomy and SALLIE on 4/11/2024), recently admitted from 09/27-12/02 for acute cholecystitis s/p cholecystectomy presented with shortness of breath for 1day. In ED, pt was hypoxic to 86% on RA, trop 1.7K, EKG no new changes, CXR fluid overload. Admitted for AHRF 2/2 fluid overload.

## 2024-12-19 NOTE — H&P ADULT - NSHPLABSRESULTS_GEN_ALL_CORE
Echocardiogram:  TTE 10/01/2024; mild left ventricular dilatation overall; mildly decreased left ventricular systolic function (diffuse); ejection fraction (2D) = 46%; mildly increased left ventricular wall thickness; normal right ventricular size normal right ventricular function; mild valvular aortic regurgitation no evidence for valvular aortic stenosis; minimal valvular mitral regurgitation; moderate valvular tricuspid regurgitation; moderate pulmonary hypertension; estimated pulmonary artery systolic pressure = 60 mmHg; small pericardial effusion pleural effusion ascites      TTE 04/09/2024; moderate left ventricular dilatation overall mildly decreased left ventricular systolic function (segmental); ejection fraction (2D) = 45%; no evidence for left ventricle thrombus; mild right ventricular dilatation normal right ventricular function; no evidence for valvular aortic stenosis; minimal valvular mitral regurgitation; moderate tricuspid regurgitation; mild pulmonary hypertension     'TTE 7/18/2023; severe left ventricular dilatation;’ overall moderately decreased left ventricular systolic function (diffuse); ejection fraction (2D) = 35%; mildly increased left ventricular wall thickness; global longitudinal strain (Lito): abnormal = -8.5%; moderate right ventricular dilatation; decreased right ventricular function grade III diastolic dysfunction with markedly elevated left atrial pressure; severe left atrial dilatation; Indexed Volume-biplane(2D) = 54.2 mL/m\S\2; right atrial dilatation; no evidence for valvular aortic stenosis; peak velocity = 1.3 m/s; mean gradient = 3 mmHg; mild valvular aortic regurgitation; minimal valvular mitral regurgitation; moderate tricuspid regurgitation; severe pulmonary hypertension; estimated pulmonary artery systolic pressure = 67mmHg; mild to moderate pulmonic regurgitation; minimal circumferential pericardial effusion    Cardiac catheterization:  LHC + Impella assisted PCI 04/09/2024  Intervention  1. Successful intervention of LAD - Mid (Atherectomy, IVL and SALLIE - Synergy XD)  2. Successful intervention of LAD branch D1 (PTCA, Atherotomy, IVL and SALLIE -Synergy XD)  3. Successful intervention of LCx branch LPL1 (Atherotomy, Atherectomy and SALLIE -Synergy XD) :  Coronary Angiography Dominance: Right Spasm:No Anomalies: None Vessel Segment  Occlusion Morphology Distal Vessel  RCA Proximal < 30% Calcified: Mild Large size, mild disease, -branching vessel  RPDA Mild diffuse disease Moderate size, mild disease  RPL1 Moderate diffuse disease Small size  Left Main No Obstruction  LAD Proximal < 30% Mid 80 - 90% Bifurcation, Calcified: Severe Large size, mild  disease D1 90 - 95% Bifurcation, Ostial, Calcified: Severe Moderate size, mild  disease  LCx Proximal No Obstruction Distal 30 - 50% Small size, severe disease  OM1 Mild diffuse disease Small size  OM2 Mild diffuse disease Small size  LPL1 80 - 90% Calcified: Severe Large size, mild disease    [facility] 5/31/2023  Hepatic Catherization Hepatic Free Pressure : 13; Hepatic Wedge Pressure : 16;  Hepatic Gradient : 3 (No evidence of portalhypertension)  Ao (S/D/Mean) 160/80/107 98 126/60/82 98  PCW (Mean/a/v) 20/36 16  PA (S/D/Mean) 50/20/30 66 48/18/28 68  RV (S/EDP) 50/12  RA (Mean/a/v) 12  Heart Rate 70 70  Baseline Post NTG  Measurement Measurement  Chato - CO (L/min) 6 6.3  Chato - CI (L/min/M2) 2.8 2.9  Thermodilution - CO (L/min) 8.6  Thermodilution - CI (L/min/M2) 4  PVR (Wood Units) 1.7 1.9  PVRI (Wood Units/M2) 3.6 4.1 Echocardiogram:  TTE 10/01/2024; mild left ventricular dilatation overall; mildly decreased left ventricular systolic function (diffuse); ejection fraction (2D) = 46%; mildly increased left ventricular wall thickness; normal right ventricular size normal right ventricular function; mild valvular aortic regurgitation no evidence for valvular aortic stenosis; minimal valvular mitral regurgitation; moderate valvular tricuspid regurgitation; moderate pulmonary hypertension; estimated pulmonary artery systolic pressure = 60 mmHg; small pericardial effusion pleural effusion ascites      TTE 04/09/2024; moderate left ventricular dilatation overall mildly decreased left ventricular systolic function (segmental); ejection fraction (2D) = 45%; no evidence for left ventricle thrombus; mild right ventricular dilatation normal right ventricular function; no evidence for valvular aortic stenosis; minimal valvular mitral regurgitation; moderate tricuspid regurgitation; mild pulmonary hypertension    TTE 7/18/2023; severe left ventricular dilatation;’ overall moderately decreased left ventricular systolic function (diffuse); ejection fraction (2D) = 35%; mildly increased left ventricular wall thickness; global longitudinal strain (Lito): abnormal = -8.5%; moderate right ventricular dilatation; decreased right ventricular function grade III diastolic dysfunction with markedly elevated left atrial pressure; severe left atrial dilatation; Indexed Volume-biplane(2D) = 54.2 mL/m\S\2; right atrial dilatation; no evidence for valvular aortic stenosis; peak velocity = 1.3 m/s; mean gradient = 3 mmHg; mild valvular aortic regurgitation; minimal valvular mitral regurgitation; moderate tricuspid regurgitation; severe pulmonary hypertension; estimated pulmonary artery systolic pressure = 67mmHg; mild to moderate pulmonic regurgitation; minimal circumferential pericardial effusion    Cardiac catheterization:  LHC + Impella assisted PCI 04/09/2024  => Intervention;   1. Successful intervention of LAD - Mid (Atherectomy, IVL and SALLIE - Synergy XD)  2. Successful intervention of LAD branch D1 (PTCA, Atherotomy, IVL and SALLIE -Synergy XD)  3. Successful intervention of LCx branch LPL1 (Atherotomy, Atherectomy and SALLIE -Synergy XD) :  RCA Proximal < 30% Calcified: Mild Large size, mild disease, -branching vessel  RPDA Mild diffuse disease Moderate size, mild disease  RPL1 Moderate diffuse disease Small size  Left Main No Obstruction  LAD Proximal < 30% Mid 80 - 90% Bifurcation, Calcified: Severe Large size, mild disease D1 90 - 95% Bifurcation, Ostial, Calcified: Severe Moderate size, mild disease  LCx Proximal No Obstruction Distal 30 - 50% Small size, severe disease  OM1 Mild diffuse disease Small size  OM2 Mild diffuse disease Small size  LPL1 80 - 90% Calcified: Severe Large size, mild disease    [facility] 5/31/2023  Hepatic Catherization Hepatic Free Pressure : 13; Hepatic Wedge Pressure : 16;  Hepatic Gradient : 3 (No evidence of portal hypertension)  Ao (S/D/Mean) 160/80/107 98 126/60/82 98  PCW (Mean/a/v) 20/36 16  PA (S/D/Mean) 50/20/30 66 48/18/28 68  RV (S/EDP) 50/12  RA (Mean/a/v) 12  Heart Rate 70 70  Baseline Post NTG  Measurement Measurement  Chato - CO (L/min) 6 6.3  Chato - CI (L/min/M2) 2.8 2.9  Thermodilution - CO (L/min) 8.6  Thermodilution - CI (L/min/M2) 4  PVR (Wood Units) 1.7 1.9  PVRI (Wood Units/M2) 3.6 4.1

## 2024-12-19 NOTE — H&P ADULT - PROBLEM SELECTOR PLAN 1
p/w SOB and hypoxia to 86% on RA  Flu, COVID, RSV negative  CXR fluid overload  Last HD on Wednesday  s/p bumex X1 in ED  Last echo in [facility]IE from 2023: LVEF 35-40%, Moderate global left ventricular systolic dysfunction. Grade III-IV diastolic dysfunction (severe).    -pt makes minimal urine; holding off on diuretics for now  -f/u proBNP  -possible HD in am for fluid overload  -Wean off oxygen as tolerated  -Cardio, Dr Quick consulted  -Nephrology Dr Griffiths consulted p/w trop 1.7k>2K>2.8K  EKG: left ant fascicular block, no new changes  pt c/o central chest pain; reproducible    -started Heparin drip  -TTE after cardio recs  -Cardiology Dr Quick consulted

## 2024-12-19 NOTE — PROGRESS NOTE ADULT - SUBJECTIVE AND OBJECTIVE BOX
NEPHROLOGY MEDICAL CARE, [facility] - Dr. Domenic Griffiths/ Dr. Bishnu Amador/ Dr. Fili Smiley/ Dr. Naomie Crowder    Date of Service: 12-19-24    Patient was seen and examined at bedside.    Consultation requested by:  Unknown Doctor    Reason for Consult: End Stage Renal Disease management        HPI:  54M with PMH HTN. HLD, ESRD on HD ESRD on HD ( L UE AVF)  MWF([facility] [place], Nephro: Dr. Griffiths),  ascites (requiring repeat paracenteses q4-6wks), NICM with moderate LV dysfunction and CAD NSTEMI 4/7/2024 s/p atherectomy, IVL, SALLIE, intervention of LAD branch D1 (PTCA atherectomy, atherectomy and SALLIE on 4/11/2024), recently admitted from 09/27-12/02 for acute cholecystitis s/p cholecystectomy presented with shortness of breath for 1day. As per pt, after HD session he was short of breath at rest, acute in onset, orthopnea present. Also endorses central chest pain, reproducible. Patient had 1.5kg UF yesterday during HD due to hypotension.    Denies cough, fever and chills, worsening leg edema. As per pt he had PITA drain removed last week outpt, endorses gradual reaccumulation of ascitic fluid after removal of drain.   States he makes minimal urine ~1oz. Patient notes weight gain since recent discharge approximately 6 kg. Follows with Dr. Griffiths nephrology.  In ED, pt was hypoxic to 86% on RA, trop 1.7K, EKG no new changes, CXR fluid overload  s/p bumex 2mg IV, aspirin and nitroglycerin in ED  AS per ED note,  d/w Cardiology fellow Dr. Chambers, troponin leak likely type II demand, however can start heparin until formal cardiology          PMH:   Type 2 diabetes mellitus    Hypertension    HLD (hyperlipidemia)    End stage renal disease    Bone spur    Anemia    Injury of right wrist    History of hyperkalemia    No pertinent past medical history        PSH:   S/P arthroscopy of right shoulder    History of vascular access device    H/O right wrist surgery    AV fistula    H/O ventral hernia repair    S/P cholecystectomy        FAMILY HISTORY:  FH: hypertension  mother, father- alive    FH: type 2 diabetes  mother, father- alive        Social History:  non-smoker/ non-alcoholic     Home Meds:  Home Medications:  aspirin 81 mg oral tablet, chewable: 1 tab(s) chewed once a day (19 Dec 2024 11:34)  calcium acetate: 667 milligram(s) orally 3 times a day (with meals) (19 Dec 2024 04:03)  carvedilol 12.5 mg oral tablet: 1 tab(s) orally 2 times a day (19 Dec 2024 04:04)  furosemide 20 mg oral tablet: 1 tab(s) orally once a day (19 Dec 2024 04:04)  hydrALAZINE 25 mg oral tablet: 1 tab(s) orally 2 times a day (19 Dec 2024 04:01)  lisinopril 20 mg oral tablet: 1 tab(s) orally once a day (19 Dec 2024 11:35)  lisinopril 40 mg oral tablet: 1 tab(s) orally once a day (19 Dec 2024 04:04)  lovastatin 10 mg oral tablet: 1 tab(s) orally once a day (19 Dec 2024 04:04)  NIFEdipine (Eqv-Procardia XL) 90 mg oral tablet, extended release: 1 tab(s) orally once a day on non-HD days (19 Dec 2024 11:37)  Plavix 75 mg oral tablet: 1 tab(s) orally once a day (19 Dec 2024 11:33)  Emani-Lisette oral tablet: 1 tab(s) orally once a day (19 Dec 2024 04:04)  traZODone 100 mg oral tablet: 1 orally once a day (at bedtime) (19 Dec 2024 04:04)      Allergies:  Allergies    No Known Allergies    Intolerances        REVIEW OF SYSTEMS:  CONSTITUTIONAL: No fever; No weight loss; No fatigue  EYES: No eye pain; No visual disturbances; No discharge  ENMT:  No difficulty hearing; No tinnitus;  No vertigo; No sinus; No throat pain  NECK: No pain; No stiffness  BREASTS: No pain; No masses; No nipple discharge  RESPIRATORY: No cough; No wheezing; No chills; No hemoptysis;  shortness of breath  CARDIOVASCULAR: No chest pain; No palpitations; No dizziness;  leg swelling  GASTROINTESTINAL: No abdominal pain; No epigastric pain; No nausea; No vomiting; No hematemesis; No diarrhea; No constipation. No melena   GENITOURINARY: No dysuria No frequency; No hematuria; No incontinence  NEUROLOGICAL: No headaches; No memory loss; No loss of strength; No numbness; No tremors  SKIN: No itching; No burning; No rashes  ENDOCRINE: No heat or cold intolerance; No hair loss  MUSCULOSKELETAL: No joint pain or swelling; No muscle, back, or extremity pain  PSYCHIATRIC: No depression; No anxiety; No mood swings; No difficulty sleeping  HEME/LYMPH: No easy bruising; No bleeding gums  ALLERY AND IMMUNOLOGIC: No hives or eczema    Vital Signs Last 24 Hrs  T(C): 36.4 (19 Dec 2024 12:17), Max: 37.3 (18 Dec 2024 23:53)  T(F): 97.6 (19 Dec 2024 12:17), Max: 99.1 (18 Dec 2024 23:53)  HR: 70 (19 Dec 2024 12:17) (70 - 84)  BP: 116/62 (19 Dec 2024 12:17) (100/61 - 136/72)  BP(mean): 75 (19 Dec 2024 04:00) (69 - 75)  RR: 15 (19 Dec 2024 12:17) (15 - 22)  SpO2: 99% (19 Dec 2024 12:17) (86% - 100%)    Parameters below as of 19 Dec 2024 12:17  Patient On (Oxygen Delivery Method): nasal cannula  O2 Flow (L/min): 2      12-19 @ 07:01  -  12-19 @ 13:01  --------------------------------------------------------  IN: 240 mL / OUT: 0 mL / NET: 240 mL        PHYSICAL EXAM:  General: No acute respiratory distress.  Eyes: conjunctiva and sclera clear  ENMT: Atraumatic, Normocephalic, supple, No JVD present. Moist mucous membranes  Respiratory:   Bilaterally rales; no rhonchi, wheezing  Cardiovascular: S1S2+; no m/r/g  Gastrointestinal: Soft, no tenderness, Nondistended; Bowel sounds present  Neuro:  Awake, Alert & Oriented X3, No focal deficits present.   Ext:  2+ Peripheral Pulses and 2+ pedal edema, No Cyanosis  Skin: No rashes  Back: No CVA tenderness.  Dialysis Access::   Left upper arm AVF thrill/bruit+         LABS:                        7.8    7.81  )-----------( 196      ( 19 Dec 2024 05:00 )             22.7     12-19    135  |  100  |  36[H]  ----------------------------<  131[H]  4.5   |  29  |  4.77[H]    Ca    8.2[L]      19 Dec 2024 05:00  Phos  3.5     12-19  Mg     2.0     12-19    TPro  6.6  /  Alb  2.5[L]  /  TBili  0.5  /  DBili  x   /  AST  23  /  ALT  15  /  AlkPhos  107  12-19    PT/INR - ( 19 Dec 2024 00:51 )   PT: 14.8 sec;   INR: 1.27 ratio         PTT - ( 19 Dec 2024 00:51 )  PTT:32.3 sec  Urinalysis Basic - ( 19 Dec 2024 05:00 )    Color: x / Appearance: x / SG: x / pH: x  Gluc: 131 mg/dL / Ketone: x  / Bili: x / Urobili: x   Blood: x / Protein: x / Nitrite: x   Leuk Esterase: x / RBC: x / WBC x   Sq Epi: x / Non Sq Epi: x / Bacteria: x      Magnesium: 2.0 mg/dL (12-19 @ 05:00)  Phosphorus: 3.5 mg/dL (12-19 @ 05:00)  Magnesium: 2.0 mg/dL (12-18 @ 20:23)  Phosphorus: 3.0 mg/dL (12-18 @ 20:23)    Urine studies      Medications:  acetaminophen     Tablet .. 650 milliGRAM(s) Oral every 6 hours PRN  aspirin  chewable 81 milliGRAM(s) Oral daily  atorvastatin 10 milliGRAM(s) Oral at bedtime  calcium acetate 667 milliGRAM(s) Oral three times a day with meals  carvedilol 12.5 milliGRAM(s) Oral every 12 hours  clopidogrel Tablet 75 milliGRAM(s) Oral daily  heparin   Injectable 5300 Unit(s) IV Push every 6 hours PRN  heparin  Infusion.  Unit(s)/Hr IV Continuous <Continuous>  hydrALAZINE 25 milliGRAM(s) Oral two times a day  lisinopril 40 milliGRAM(s) Oral daily  pantoprazole    Tablet 40 milliGRAM(s) Oral before breakfast  traZODone 100 milliGRAM(s) Oral at bedtime

## 2024-12-19 NOTE — H&P ADULT - HISTORY OF PRESENT ILLNESS
54M with PMH HTN. HLD, ESRD on HD MWF via LUE AVF, ascites (requiring repeat paracenteses q4-6wks), NICM with moderate LV dysfunction and CAD NSTEMI 4/7/2024 s/p atherectomy, IVL, SALLIE, intervention of LAD branch D1 (PTCA atherectomy, atherectomy and SALLIE on 4/11/2024), recently admitted from 09/27-12/02 for acute cholecystitis s/p cholecystectomy presented with shortness of breath 54M with PMH HTN. HLD, ESRD on HD MWF via LUE AVF, ascites (requiring repeat paracenteses q4-6wks), NICM with moderate LV dysfunction and CAD NSTEMI 4/7/2024 s/p atherectomy, IVL, SALLIE, intervention of LAD branch D1 (PTCA atherectomy, atherectomy and SALLIE on 4/11/2024), recently admitted from 09/27-12/02 for acute cholecystitis s/p cholecystectomy presented with shortness of breath for 1day. As per pt, after HD session he was short of breath at rest, acute in onset, orthopnea present. Also endorses central chest pain, reproducible. Denies cough, fever and chills, worsening leg edema. As per pt he had PITA drain removed last week outpt, endorses gradual reaccumulation of ascitic fluid after removal of drain.   States he makes minimal urine ~1oz. Patient notes weight gain since recent discharge approximately 6 kg. Follows with Dr. Griffiths nephrology.  In ED, pt was hypoxic to 86% on RA, trop 1.7K, EKG no new changes, CXR fluid overload  s/p bumex 2mg IV, aspirin and nitroglycerin in ED  AS per ED note,  d/w Cardiology fellow Dr. Chambers, troponin leak likely type II demand, however can start heparin until formal cardiology evaluation in AM   .

## 2024-12-19 NOTE — PROGRESS NOTE ADULT - PROBLEM SELECTOR PLAN 2
p/w SOB and hypoxia to 86% on RA  Flu, COVID, RSV negative  CXR fluid overload  Last HD on Wednesday  s/p bumex X1 in ED  Last echo in HIE from 2023: LVEF 35-40%, Moderate global left ventricular systolic dysfunction. Grade III-IV diastolic dysfunction (severe).    -pt makes minimal urine; holding off on diuretics for now  -f/u proBNP  -possible HD in am for fluid overload  -Wean off oxygen as tolerated  -Cardio, Dr Quick consulted  -Nephrology Dr Griffiths consulted p/w SOB and hypoxia to 86% on RA  Flu, COVID, RSV negative  CXR fluid overload  Last HD on Wednesday  s/p bumex X1 in ED  Last echo in HIE from 2023: LVEF 35-40%, Moderate global left ventricular systolic dysfunction. Grade III-IV diastolic dysfunction (severe).  proBNP >[id number]  Cardio, Dr Quick consulted  Nephrology Dr Griffiths consulted    -pt makes minimal urine; holding off on diuretics for now  -HD today for fluid overload  -Wean off oxygen as tolerated

## 2024-12-19 NOTE — H&P ADULT - PROBLEM SELECTOR PLAN 2
plan as above p/w SOB and hypoxia to 86% on RA  Flu, COVID, RSV negative  CXR fluid overload  Last HD on Wednesday  s/p bumex X1 in ED  Last echo in HIE from 2023: LVEF 35-40%, Moderate global left ventricular systolic dysfunction. Grade III-IV diastolic dysfunction (severe).    -pt makes minimal urine; holding off on diuretics for now  -f/u proBNP  -possible HD in am for fluid overload  -Wean off oxygen as tolerated  -Cardio, Dr Quick consulted  -Nephrology Dr Griffiths consulted

## 2024-12-19 NOTE — PROGRESS NOTE ADULT - PROBLEM SELECTOR PLAN 1
p/w trop 1.7k>2K>2.8K  EKG: left ant fascicular block, no new changes  pt c/o central chest pain; reproducible    -started Heparin drip  -TTE after cardio recs  -Cardiology Dr Quick consulted p/w trop 1.7k>2K>2.8K  EKG: left ant fascicular block, no new changes  pt c/o central chest pain; reproducible  Cardio, Dr Quick consulted    -c/w Heparin drip  -f/u cardio recs  -resumed DAPT

## 2024-12-19 NOTE — PROGRESS NOTE ADULT - PROBLEM SELECTOR PLAN 8
on lovastatin  started atorvastatin (therapeutic interchange) on lovastatin  -c/w atorvastatin (therapeutic interchange)

## 2024-12-19 NOTE — H&P ADULT - ATTENDING COMMENTS
55yo m w pmh htn, hld, dm, esrd on hd mwf via lue avf, cad s/p pci w stents, hfref c/b recurrent cardiac ascites, p/w sob/briggs; in er, found to be in ahrf req 2lpm via nc; suspect 2/2 adhf iso esrd; admit to medicine for further mgmt    Outpatient heart failure team documentation 10/1/2024 reviewed in hie - prev cardiac testing listed above  trop ~1700s -> 2000s; No clinft of acs; ekg w no new st seg – t wave changes suggestive of acute ischemia; suspect demand and/or decreased clearance  Clinically w crackles on auscultation; cxr pending final read but appears to show pulm vasc paul; pocus by er revealed b lines; most recent TTE 10/01/2024; mild lv dilatation overall; mildly decreased lv systolic function (diffuse); ejection fraction (2D) = 46%...mildly increased lv wall thickness….normal rv size and ventricular function…moderate valvular tricuspid regurgitation….moderate pulmonary hypertension; estimated pasp = 60 mmHg  Currently in no respiratory distress w adequate spo2 on 2lpm via nc  suspect 2/2 adhf iso esrd  er contacted [facility]; recommended ufh gtt until formal cards eval in am; no need for transfer for urgent card cath  s/p aspirin 324mg x1 + nitroglycerin 0.4mg sl x1 + bumex 2mg ivp x1 in er  follow up lipid profile, a1c, tsh; bnp, tte; procal, full rvp; vbg/abg  Monitor for chest pain, telemetry/EKG changes  Monitor SpO2, RR, for signs of respiratory distress; Goal SpO2 >88-92%  Monitor UO, acid-base balance, electrolytes  trend volume status via I/Os, daily weights; salt and fluid restriction   avoid nephrotoxic agents; appropriate dose adjustments for all renally cleared medications  start ufh gtt  cont home antiplt, statin, bb  continue home gdmt  vol mgmt w hd  cards consult in am  nephro consult in am    otherwise, concur w a+p as described by resident above 55yo m w pmh htn, hld, dm, esrd on hd mwf via lue avf, cad s/p pci w stents, hfref c/b recurrent cardiac ascites, p/w sob/briggs; in er, found to be in ahrf req 2lpm via nc; suspect 2/2 adhf iso esrd; admit to medicine for further mgmt    Outpatient heart failure team documentation 10/1/2024 reviewed in hie - prev cardiac testing listed above  trop ~1700s -> 2000s; reproducible substernal chest pain; ekg w no new st seg – t wave changes suggestive of acute ischemia; suspect demand and/or decreased clearance  Clinically w crackles on auscultation; cxr pending final read but appears to show pulm vasc paul; pocus by er revealed b lines; most recent TTE 10/01/2024; mild lv dilatation overall; mildly decreased lv systolic function (diffuse); ejection fraction (2D) = 46%...mildly increased lv wall thickness….normal rv size and ventricular function…moderate valvular tricuspid regurgitation….moderate pulmonary hypertension; estimated pasp = 60 mmHg  Currently in no respiratory distress w adequate spo2 on 2lpm via nc  suspect 2/2 adhf iso esrd  er contacted [facility]; recommended ufh gtt until formal cards eval in am; no need for transfer for urgent card cath  s/p aspirin 324mg x1 + nitroglycerin 0.4mg sl x1 + bumex 2mg ivp x1 in er  follow up lipid profile, a1c, tsh; bnp, tte; procal, full rvp; vbg/abg  Monitor for chest pain, telemetry/EKG changes  Monitor SpO2, RR, for signs of respiratory distress; Goal SpO2 >88-92%  Monitor UO, acid-base balance, electrolytes  trend volume status via I/Os, daily weights; salt and fluid restriction   avoid nephrotoxic agents; appropriate dose adjustments for all renally cleared medications  start ufh gtt  cont home antiplt, statin, bb  continue home gdmt  vol mgmt w hd  cards consult in am  nephro consult in am    otherwise, concur w a+p as described by resident above

## 2024-12-19 NOTE — PROGRESS NOTE ADULT - PROBLEM SELECTOR PLAN 6
has h/o recurrent ascites requiring routine para  likely cardiac in origin  last para on last admission to [facility] with cholecystectomy  PITA drain removed last week; was having drainage from the tract; not anymore  pt reports gradual distention  PE: soft, non distended, non tender on Coreg, hydralazine and lisinopril at home  -c/w home meds with holding parameters

## 2024-12-19 NOTE — CONSULT NOTE ADULT - SUBJECTIVE AND OBJECTIVE BOX
NEPHROLOGY MEDICAL CARE, [facility] - Dr. Domenic Griffiths/ Dr. Bishnu Amador/ Dr. Fili Smiley/ Dr. Naomie Crowder    Date of Service: 12-19-24    Patient was seen and examined at bedside.     Consultation requested by:  Unknown Doctor    Reason for Consult:    HPI:  54M with PMH HTN. HLD, ESRD on HD ESRD on HD ( L UE AVF)  MWF([place], Nephro: Dr. Griffiths),  ascites (requiring repeat paracenteses q4-6wks), NICM with moderate LV dysfunction and CAD NSTEMI 4/7/2024 s/p atherectomy, IVL, SALLIE, intervention of LAD branch D1 (PTCA atherectomy, atherectomy and SALLIE on 4/11/2024), recently admitted from 09/27-12/02 for acute cholecystitis s/p cholecystectomy presented with shortness of breath for 1day. As per pt, after HD session he was short of breath at rest, acute in onset, orthopnea present. Also endorses central chest pain, reproducible. Patient had 1.5kg UF yesterday during HD due to hypotension.    Denies cough, fever and chills, worsening leg edema. As per pt he had PITA drain removed last week outpt, endorses gradual reaccumulation of ascitic fluid after removal of drain.   States he makes minimal urine ~1oz. Patient notes weight gain since recent discharge approximately 6 kg. Follows with Dr. Griffiths nephrology.  In ED, pt was hypoxic to 86% on RA, trop 1.7K, EKG no new changes, CXR fluid overload  s/p bumex 2mg IV, aspirin and nitroglycerin in ED  AS per ED note,  d/w Cardiology fellow Dr. Chambers, troponin leak likely type II demand, however can start heparin until formal cardiology         PMH:   Type 2 diabetes mellitus    Hypertension    HLD (hyperlipidemia)    End stage renal disease    Bone spur    Anemia    Injury of right wrist    History of hyperkalemia    No pertinent past medical history        PSH:   S/P arthroscopy of right shoulder    History of vascular access device    H/O right wrist surgery    AV fistula    H/O ventral hernia repair    S/P cholecystectomy        FAMILY HISTORY:  FH: hypertension  mother, father- alive    FH: type 2 diabetes  mother, father- alive        Social History:  non-smoker/ non-alcoholic     Home Meds:  Home Medications:  aspirin 81 mg oral tablet, chewable: 1 tab(s) chewed once a day (19 Dec 2024 11:34)  calcium acetate: 667 milligram(s) orally 3 times a day (with meals) (19 Dec 2024 04:03)  carvedilol 12.5 mg oral tablet: 1 tab(s) orally 2 times a day (19 Dec 2024 04:04)  furosemide 20 mg oral tablet: 1 tab(s) orally once a day (19 Dec 2024 04:04)  hydrALAZINE 25 mg oral tablet: 1 tab(s) orally 2 times a day (19 Dec 2024 04:01)  lisinopril 20 mg oral tablet: 1 tab(s) orally once a day (19 Dec 2024 11:35)  lisinopril 40 mg oral tablet: 1 tab(s) orally once a day (19 Dec 2024 04:04)  lovastatin 10 mg oral tablet: 1 tab(s) orally once a day (19 Dec 2024 04:04)  NIFEdipine (Eqv-Procardia XL) 90 mg oral tablet, extended release: 1 tab(s) orally once a day on non-HD days (19 Dec 2024 11:37)  Plavix 75 mg oral tablet: 1 tab(s) orally once a day (19 Dec 2024 11:33)  Emani-Lisette oral tablet: 1 tab(s) orally once a day (19 Dec 2024 04:04)  traZODone 100 mg oral tablet: 1 orally once a day (at bedtime) (19 Dec 2024 04:04)      Allergies:  Allergies    No Known Allergies    Intolerances        REVIEW OF SYSTEMS:  CONSTITUTIONAL: No fever; No weight loss; No fatigue  EYES: No eye pain; No visual disturbances; No discharge  ENMT:  No difficulty hearing; No tinnitus;  No vertigo; No sinus; No throat pain  NECK: No pain; No stiffness  BREASTS: No pain; No masses; No nipple discharge  RESPIRATORY: No cough; No wheezing; No chills; No hemoptysis;  shortness of breath  CARDIOVASCULAR: No chest pain; No palpitations; No dizziness; leg swelling  GASTROINTESTINAL: No abdominal pain; No epigastric pain; No nausea; No vomiting; No hematemesis; No diarrhea; No constipation. No melena   GENITOURINARY: No dysuria No frequency; No hematuria; No incontinence  NEUROLOGICAL: No headaches; No memory loss; No loss of strength; No numbness; No tremors  SKIN: No itching; No burning; No rashes  ENDOCRINE: No heat or cold intolerance; No hair loss  MUSCULOSKELETAL: No joint pain or swelling; No muscle, back, or extremity pain  PSYCHIATRIC: No depression; No anxiety; No mood swings; No difficulty sleeping  HEME/LYMPH: No easy bruising; No bleeding gums  ALLERY AND IMMUNOLOGIC: No hives or eczema    Vital Signs Last 24 Hrs  T(C): 36.4 (19 Dec 2024 12:17), Max: 37.3 (18 Dec 2024 23:53)  T(F): 97.6 (19 Dec 2024 12:17), Max: 99.1 (18 Dec 2024 23:53)  HR: 70 (19 Dec 2024 12:17) (70 - 84)  BP: 116/62 (19 Dec 2024 12:17) (100/61 - 136/72)  BP(mean): 75 (19 Dec 2024 04:00) (69 - 75)  RR: 15 (19 Dec 2024 12:17) (15 - 22)  SpO2: 99% (19 Dec 2024 12:17) (86% - 100%)    Parameters below as of 19 Dec 2024 12:17  Patient On (Oxygen Delivery Method): nasal cannula  O2 Flow (L/min): 2      12-19 @ 07:01  -  12-19 @ 13:09  --------------------------------------------------------  IN: 240 mL / OUT: 0 mL / NET: 240 mL          PHYSICAL EXAM:  General: No acute respiratory distress.  Eyes: conjunctiva and sclera clear  ENMT: Atraumatic, Normocephalic, supple, No JVD present. Moist mucous membranes  Respiratory:   Bilaterally rales; no rhonchi, wheezing  Cardiovascular: S1S2+; no m/r/g  Gastrointestinal: Soft, no tenderness, Nondistended; Bowel sounds present  Neuro:  Awake, Alert & Oriented X3, No focal deficits present.   Ext:  2+ Peripheral Pulses and 2+ pedal edema, No Cyanosis  Skin: No rashes  Back: No CVA tenderness.  Dialysis Access::   Left upper arm AVF thrill/bruit+         LABS:                        7.8    7.81  )-----------( 196      ( 19 Dec 2024 05:00 )             22.7     12-19    135  |  100  |  36[H]  ----------------------------<  131[H]  4.5   |  29  |  4.77[H]    Ca    8.2[L]      19 Dec 2024 05:00  Phos  3.5     12-19  Mg     2.0     12-19    TPro  6.6  /  Alb  2.5[L]  /  TBili  0.5  /  DBili  x   /  AST  23  /  ALT  15  /  AlkPhos  107  12-19    PT/INR - ( 19 Dec 2024 00:51 )   PT: 14.8 sec;   INR: 1.27 ratio         PTT - ( 19 Dec 2024 00:51 )  PTT:32.3 sec  Urinalysis Basic - ( 19 Dec 2024 05:00 )    Color: x / Appearance: x / SG: x / pH: x  Gluc: 131 mg/dL / Ketone: x  / Bili: x / Urobili: x   Blood: x / Protein: x / Nitrite: x   Leuk Esterase: x / RBC: x / WBC x   Sq Epi: x / Non Sq Epi: x / Bacteria: x      Magnesium: 2.0 mg/dL (12-19 @ 05:00)  Phosphorus: 3.5 mg/dL (12-19 @ 05:00)  Magnesium: 2.0 mg/dL (12-18 @ 20:23)  Phosphorus: 3.0 mg/dL (12-18 @ 20:23)    Urine studies      Medications:  MEDICATIONS  (STANDING):  aspirin  chewable 81 milliGRAM(s) Oral daily  atorvastatin 10 milliGRAM(s) Oral at bedtime  calcium acetate 667 milliGRAM(s) Oral three times a day with meals  carvedilol 12.5 milliGRAM(s) Oral every 12 hours  clopidogrel Tablet 75 milliGRAM(s) Oral daily  epoetin ignacia-epbx (RETACRIT) Injectable 94826 Unit(s) IV Push <User Schedule>  heparin  Infusion.  Unit(s)/Hr (10 mL/Hr) IV Continuous <Continuous>  lisinopril 40 milliGRAM(s) Oral daily  pantoprazole    Tablet 40 milliGRAM(s) Oral before breakfast  traZODone 100 milliGRAM(s) Oral at bedtime    MEDICATIONS  (PRN):  acetaminophen     Tablet .. 650 milliGRAM(s) Oral every 6 hours PRN Temp greater or equal to 38C (100.4F), Mild Pain (1 - 3)  heparin   Injectable 5300 Unit(s) IV Push every 6 hours PRN For aPTT less than 40          
PATIENT SEEN AND EXAMINED BY JEAN PAUL GABRIEL M.D. ON :- 12/19/24  DATE OF SERVICE:    12/19/24         Interim events noted,Labs ,Radiological studies and Cardiology tests reviewed .    Patient is a 54y old  Male who presents with a chief complaint of AHRF 2/2 volume overload (19 Dec 2024 13:08)      HPI:  54M with PMH HTN. HLD, ESRD on HD MWF via LUE AVF, ascites (requiring repeat paracenteses q4-6wks), NICM with moderate LV dysfunction and CAD NSTEMI 4/7/2024 s/p atherectomy, IVL, SALLIE, intervention of LAD branch D1 (PTCA atherectomy, atherectomy and SALLIE on 4/11/2024), recently admitted from 09/27-12/02 for acute cholecystitis s/p cholecystectomy presented with shortness of breath for 1day. As per pt, after HD session he was short of breath at rest, acute in onset, orthopnea present. Also endorses central chest pain, reproducible. Denies cough, fever and chills, worsening leg edema. As per pt he had PITA drain removed last week outpt, endorses gradual reaccumulation of ascitic fluid after removal of drain.   States he makes minimal urine ~1oz. Patient notes weight gain since recent discharge approximately 6 kg. Follows with Dr. Griffiths nephrology.  In ED, pt was hypoxic to 86% on RA, trop 1.7K, EKG no new changes, CXR fluid overload  s/p bumex 2mg IV, aspirin and nitroglycerin in ED  AS per ED note,  d/w Cardiology fellow Dr. Chambers, troponin leak likely type II demand, however can start heparin until formal cardiology evaluation in AM   .   (19 Dec 2024 03:12)      PAST MEDICAL & SURGICAL HISTORY:  Type 2 diabetes mellitus      Hypertension      End stage renal disease  started HD 2/2019 T, Th, Sat via right chest permacath      Bone spur  right shoulder- hx of - sx done      Anemia      Injury of right wrist  hx of at age 14      History of hyperkalemia  before HD- K-6.2 - to repeat in am of sx, pt had HD today      S/P arthroscopy of right shoulder  2010      History of vascular access device  right chest permacath - 2/2019      H/O right wrist surgery  tendons repair - at age 14      AV fistula      H/O ventral hernia repair      S/P cholecystectomy          PREVIOUS DIAGNOSTIC TESTING:      ECHO  FINDINGS:    STRESS  FINDINGS:    CATHETERIZATION  FINDINGS:    MEDICATIONS  (STANDING):  aspirin  chewable 81 milliGRAM(s) Oral daily  atorvastatin 10 milliGRAM(s) Oral at bedtime  calcium acetate 667 milliGRAM(s) Oral three times a day with meals  carvedilol 12.5 milliGRAM(s) Oral every 12 hours  clopidogrel Tablet 75 milliGRAM(s) Oral daily  epoetin ignacia-epbx (RETACRIT) Injectable 59180 Unit(s) IV Push <User Schedule>  heparin  Infusion.  Unit(s)/Hr (10 mL/Hr) IV Continuous <Continuous>  lisinopril 40 milliGRAM(s) Oral daily  pantoprazole    Tablet 40 milliGRAM(s) Oral before breakfast  traZODone 100 milliGRAM(s) Oral at bedtime    MEDICATIONS  (PRN):  acetaminophen     Tablet .. 650 milliGRAM(s) Oral every 6 hours PRN Temp greater or equal to 38C (100.4F), Mild Pain (1 - 3)  heparin   Injectable 5300 Unit(s) IV Push every 6 hours PRN For aPTT less than 40      FAMILY HISTORY:  FH: hypertension  mother, father- alive    FH: type 2 diabetes  mother, father- alive        SOCIAL HISTORY:    CIGARETTES:    ALCOHOL:    REVIEW OF SYSTEMS:  CONSTITUTIONAL: No fever, weight loss, or fatigue  EYES: No eye pain, visual disturbances, or discharge  ENMT:  No difficulty hearing, tinnitus, vertigo; No sinus or throat pain  NECK: No pain or stiffness  RESPIRATORY: No cough, wheezing, chills or hemoptysis; No shortness of breath  CARDIOVASCULAR: No chest pain, palpitations, dizziness, or leg swelling  GASTROINTESTINAL: No abdominal or epigastric pain. No nausea, vomiting, or hematemesis; No diarrhea or constipation. No melena or hematochezia.  GENITOURINARY: No dysuria, frequency, hematuria, or incontinence  NEUROLOGICAL: No headaches, memory loss, loss of strength, numbness, or tremors  SKIN: No itching, burning, rashes, or lesions   LYMPH NODES: No enlarged glands  ENDOCRINE: No heat or cold intolerance; No hair loss  MUSCULOSKELETAL: No joint pain or swelling; No muscle, back, or extremity pain  PSYCHIATRIC: No depression, anxiety, mood swings, or difficulty sleeping  HEME/LYMPH: No easy bruising, or bleeding gums  ALLERY AND IMMUNOLOGIC: No hives or eczema    Vital Signs Last 24 Hrs  T(C): 37.1 (19 Dec 2024 16:10), Max: 37.3 (18 Dec 2024 23:53)  T(F): 98.8 (19 Dec 2024 16:10), Max: 99.1 (18 Dec 2024 23:53)  HR: 68 (19 Dec 2024 16:10) (66 - 76)  BP: 126/66 (19 Dec 2024 16:10) (100/61 - 126/70)  BP(mean): 75 (19 Dec 2024 04:00) (69 - 75)  RR: 17 (19 Dec 2024 16:10) (15 - 22)  SpO2: 92% (19 Dec 2024 16:10) (91% - 100%)    Parameters below as of 19 Dec 2024 16:10  Patient On (Oxygen Delivery Method): nasal cannula  O2 Flow (L/min): 2        PHYSICAL EXAM:  GENERAL: NAD, well-groomed, well-developed  HEAD:  Atraumatic, Normocephalic  EYES: EOMI, PERRLA, conjunctiva and sclera clear  ENMT: No tonsillar erythema, exudates, or enlargement; Moist mucous membranes, Good dentition, No lesions  NECK: Supple, No JVD, Normal thyroid  NERVOUS SYSTEM:  Alert & Oriented X3, Good concentration; Motor Strength 5/5 B/L upper and lower extremities; DTRs 2+ intact and symmetric  CHEST/LUNG: Clear to percussion bilaterally; No rales, rhonchi, wheezing, or rubs  HEART: Regular rate and rhythm; No murmurs, rubs, or gallops  ABDOMEN: Soft, Nontender, Nondistended; Bowel sounds present  EXTREMITIES:  2+ Peripheral Pulses, No clubbing, cyanosis, or edema  LYMPH: No lymphadenopathy noted  SKIN: No rashes or lesions      INTERPRETATION OF TELEMETRY:    ECG:    [facility]:     LABS:                        7.8    8.05  )-----------( 192      ( 19 Dec 2024 14:50 )             22.3     12-19    135  |  100  |  36[H]  ----------------------------<  131[H]  4.5   |  29  |  4.77[H]    Ca    8.2[L]      19 Dec 2024 05:00  Phos  3.5     12-19  Mg     2.0     12-19    TPro  6.6  /  Alb  2.5[L]  /  TBili  0.5  /  DBili  x   /  AST  23  /  ALT  15  /  AlkPhos  107  12-19        PT/INR - ( 19 Dec 2024 00:51 )   PT: 14.8 sec;   INR: 1.27 ratio         PTT - ( 19 Dec 2024 14:50 )  PTT:36.7 sec  Urinalysis Basic - ( 19 Dec 2024 05:00 )    Color: x / Appearance: x / SG: x / pH: x  Gluc: 131 mg/dL / Ketone: x  / Bili: x / Urobili: x   Blood: x / Protein: x / Nitrite: x   Leuk Esterase: x / RBC: x / WBC x   Sq Epi: x / Non Sq Epi: x / Bacteria: x      Lipid Panel:   I&O's Summary    19 Dec 2024 07:01  -  19 Dec 2024 20:52  --------------------------------------------------------  IN: 980 mL / OUT: 2600 mL / NET: -1620 mL        RADIOLOGY & ADDITIONAL STUDIES:    < from: Transthoracic Echocardiogram (11.04.23 @ 16:00) >    Patient name: ALAINA CANO  YOB: 1970   Age: 53 (M)   MR#: [id number]  Study Date: 11/4/2023  [place] [facility] [place] [facility] Yun Mcadams CH[place]STIANNE  Study quality: Technically difficult  Referring Physician: Reyna Park MD  Blood Pressure: 137/68 mmHg  Height: 180 cm  Weight: 93 kg  BSA: 2.1 m2  ------------------------------------------------------------------------    PROCEDURE: Transthoracic echocardiogram with 2-D, M-Mode  and complete spectral and color flow Doppler.  INDICATION: Chest pain, unspecified (R07.9)  HISTORY:  ------------------------------------------------------------------------  DIMENSIONS:  Dimensions:     Normal Values:  LA:     6.1 cm    2.0 - 4.0 cm  Ao:     3.4 cm    2.0 - 3.8 cm  SEPTUM: 1.1 cm    0.6 - 1.2 cm  PWT:    1.0 cm    0.6 - 1.1 cm  LVIDd:  6.4 cm    3.0 - 5.6 cm  LVIDs:  5.1 cm    1.8 - 4.0 cm      Derived Variables:  LVMI: 138 g/m2  RWT: 0.31  Ejection Fraction Visual Estimate: 35-40 %    ------------------------------------------------------------------------  OBSERVATIONS:  Mitral Valve: Normal mitral valve. Trace mitral  regurgitation.  Aortic Root: Normal aortic root.  Aortic Valve: Normal trileaflet aortic valve. Trace aortic  regurgitation.  Left Atrium: Moderately dilatedleft atrium.  LA volume  index = 46 cc/m2.  Left Ventricle: Moderate global left ventricular systolic  dysfunction. Severe left ventricular enlargement. Grade  III-IV diastolic dysfunction (severe).  Right Heart: Normal right atrium. Right ventricle not well  visualized.    Probably normal right ventricular size and  systolic function. There is mild-moderate tricuspid  regurgitation. There is mild pulmonic regurgitation.  Pericardium/PleuraTrace pericardial effusion.  Hemodynamic: RA Pressure is 10 mm Hg. RV systolic pressure  is 39 mm Hg.  ------------------------------------------------------------------------  CONCLUSIONS:  1. Trace mitral regurgitation.  2.  Trace aortic regurgitation.  3. Moderately dilated left atrium.  LA volume index = 46  cc/m2.  4. Severe left ventricular enlargement.  5. Moderate global left ventricular systolic dysfunction.  6. Grade III-IV diastolic dysfunction (severe).  7. Right ventricle not well visualized.    Probably normal  right ventricular size and systolic function.  8. Trace pericardial effusion.    ------------------------------------------------------------------------  Confirmed on  11/5/2023 - 15:50:34 by Deep Benoit MD  ------------------------------------------------------------------------    < end of copied text >

## 2024-12-19 NOTE — PROGRESS NOTE ADULT - PROBLEM SELECTOR PLAN 7
h/o CAD s/p stent X3 on april, 24  on plavix at home  c/w home meds h/o CAD s/p stent X3 on april, 24  on plavix at home  -c/w home meds

## 2024-12-20 ENCOUNTER — RESULT REVIEW (OUTPATIENT)
Age: 54
End: 2024-12-20

## 2024-12-20 LAB
ANION GAP SERPL CALC-SCNC: 8 MMOL/L — SIGNIFICANT CHANGE UP (ref 5–17)
APTT BLD: 39.8 SEC — HIGH (ref 24.5–35.6)
BUN SERPL-MCNC: 50 MG/DL — HIGH (ref 7–18)
CALCIUM SERPL-MCNC: 8.4 MG/DL — SIGNIFICANT CHANGE UP (ref 8.4–10.5)
CHLORIDE SERPL-SCNC: 99 MMOL/L — SIGNIFICANT CHANGE UP (ref 96–108)
CO2 SERPL-SCNC: 27 MMOL/L — SIGNIFICANT CHANGE UP (ref 22–31)
CREAT SERPL-MCNC: 6.44 MG/DL — HIGH (ref 0.5–1.3)
EGFR: 10 ML/MIN/1.73M2 — LOW
FERRITIN SERPL-MCNC: 904 NG/ML — HIGH (ref 30–400)
GLUCOSE SERPL-MCNC: 122 MG/DL — HIGH (ref 70–99)
HBV SURFACE AG SER-ACNC: SIGNIFICANT CHANGE UP
HCT VFR BLD CALC: 24 % — LOW (ref 39–50)
HGB BLD-MCNC: 8.2 G/DL — LOW (ref 13–17)
MAGNESIUM SERPL-MCNC: 2.3 MG/DL — SIGNIFICANT CHANGE UP (ref 1.6–2.6)
MCHC RBC-ENTMCNC: 32.9 PG — SIGNIFICANT CHANGE UP (ref 27–34)
MCHC RBC-ENTMCNC: 34.2 G/DL — SIGNIFICANT CHANGE UP (ref 32–36)
MCV RBC AUTO: 96.4 FL — SIGNIFICANT CHANGE UP (ref 80–100)
NRBC # BLD: 0 /100 WBCS — SIGNIFICANT CHANGE UP (ref 0–0)
PHOSPHATE SERPL-MCNC: 4.1 MG/DL — SIGNIFICANT CHANGE UP (ref 2.5–4.5)
PLATELET # BLD AUTO: 194 K/UL — SIGNIFICANT CHANGE UP (ref 150–400)
POTASSIUM SERPL-MCNC: 4.8 MMOL/L — SIGNIFICANT CHANGE UP (ref 3.5–5.3)
POTASSIUM SERPL-SCNC: 4.8 MMOL/L — SIGNIFICANT CHANGE UP (ref 3.5–5.3)
RBC # BLD: 2.49 M/UL — LOW (ref 4.2–5.8)
RBC # FLD: 14.3 % — SIGNIFICANT CHANGE UP (ref 10.3–14.5)
SODIUM SERPL-SCNC: 134 MMOL/L — LOW (ref 135–145)
TROPONIN I, HIGH SENSITIVITY RESULT: 1458.2 NG/L — HIGH
WBC # BLD: 7.67 K/UL — SIGNIFICANT CHANGE UP (ref 3.8–10.5)
WBC # FLD AUTO: 7.67 K/UL — SIGNIFICANT CHANGE UP (ref 3.8–10.5)

## 2024-12-20 PROCEDURE — 99233 SBSQ HOSP IP/OBS HIGH 50: CPT

## 2024-12-20 PROCEDURE — 71045 X-RAY EXAM CHEST 1 VIEW: CPT | Mod: 26

## 2024-12-20 RX ORDER — NITROGLYCERIN 20.8 MG/1
0.4 FILM, EXTENDED RELEASE TRANSDERMAL ONCE
Refills: 0 | Status: COMPLETED | OUTPATIENT
Start: 2024-12-20 | End: 2024-12-20

## 2024-12-20 RX ADMIN — HEPARIN SODIUM 1750 UNIT(S)/HR: 1000 INJECTION, SOLUTION INTRAVENOUS; SUBCUTANEOUS at 06:23

## 2024-12-20 RX ADMIN — CALCIUM ACETATE 667 MILLIGRAM(S): 667 CAPSULE ORAL at 11:36

## 2024-12-20 RX ADMIN — CARVEDILOL 12.5 MILLIGRAM(S): 25 TABLET, FILM COATED ORAL at 05:27

## 2024-12-20 RX ADMIN — LISINOPRIL 40 MILLIGRAM(S): 30 TABLET ORAL at 05:27

## 2024-12-20 RX ADMIN — PANTOPRAZOLE 40 MILLIGRAM(S): 40 TABLET, DELAYED RELEASE ORAL at 05:27

## 2024-12-20 RX ADMIN — CARVEDILOL 12.5 MILLIGRAM(S): 25 TABLET, FILM COATED ORAL at 21:38

## 2024-12-20 RX ADMIN — HEPARIN SODIUM 1500 UNIT(S)/HR: 1000 INJECTION, SOLUTION INTRAVENOUS; SUBCUTANEOUS at 04:02

## 2024-12-20 RX ADMIN — TRAZODONE HYDROCHLORIDE 100 MILLIGRAM(S): 150 TABLET ORAL at 21:38

## 2024-12-20 RX ADMIN — HEPARIN SODIUM 1750 UNIT(S)/HR: 1000 INJECTION, SOLUTION INTRAVENOUS; SUBCUTANEOUS at 07:13

## 2024-12-20 RX ADMIN — CALCIUM ACETATE 667 MILLIGRAM(S): 667 CAPSULE ORAL at 08:52

## 2024-12-20 RX ADMIN — CALCIUM ACETATE 667 MILLIGRAM(S): 667 CAPSULE ORAL at 21:37

## 2024-12-20 RX ADMIN — CLOPIDOGREL BISULFATE 75 MILLIGRAM(S): 75 TABLET, FILM COATED ORAL at 11:07

## 2024-12-20 RX ADMIN — EPOETIN ALFA 10000 UNIT(S): 2000 SOLUTION INTRAVENOUS; SUBCUTANEOUS at 17:46

## 2024-12-20 RX ADMIN — Medication 81 MILLIGRAM(S): at 11:07

## 2024-12-20 RX ADMIN — NITROGLYCERIN 0.4 MILLIGRAM(S): 20.8 FILM, EXTENDED RELEASE TRANSDERMAL at 04:37

## 2024-12-20 RX ADMIN — ATORVASTATIN CALCIUM 10 MILLIGRAM(S): 40 TABLET, FILM COATED ORAL at 21:38

## 2024-12-20 NOTE — DISCHARGE NOTE PROVIDER - NSDCMRMEDTOKEN_GEN_ALL_CORE_FT
aspirin 81 mg oral tablet, chewable: 1 tab(s) chewed once a day  calcium acetate: 667 milligram(s) orally 3 times a day (with meals)  carvedilol 12.5 mg oral tablet: 1 tab(s) orally 2 times a day  furosemide 20 mg oral tablet: 1 tab(s) orally once a day  hydrALAZINE 25 mg oral tablet: 1 tab(s) orally 2 times a day  lisinopril 20 mg oral tablet: 1 tab(s) orally once a day  lisinopril 40 mg oral tablet: 1 tab(s) orally once a day  lovastatin 10 mg oral tablet: 1 tab(s) orally once a day  NIFEdipine (Eqv-Procardia XL) 90 mg oral tablet, extended release: 1 tab(s) orally once a day on non-HD days  pantoprazole 40 mg oral delayed release tablet: 1 tab(s) orally once a day (before a meal)  Plavix 75 mg oral tablet: 1 tab(s) orally once a day  Emani-Lisette oral tablet: 1 tab(s) orally once a day  traZODone 100 mg oral tablet: 1 orally once a day (at bedtime)   aspirin 81 mg oral tablet, chewable: 1 tab(s) orally once a day  calcium acetate: 667 milligram(s) orally 3 times a day (with meals)  carvedilol 12.5 mg oral tablet: 1 tab(s) orally every 12 hours  clopidogrel 75 mg oral tablet: 1 tab(s) orally once a day  lisinopril 40 mg oral tablet: 1 tab(s) orally once a day  lovastatin 10 mg oral tablet: 1 tab(s) orally once a day  pantoprazole 40 mg oral delayed release tablet: 1 tab(s) orally once a day (before a meal)  traZODone 100 mg oral tablet: 1 tab(s) orally once a day (at bedtime)

## 2024-12-20 NOTE — DISCHARGE NOTE PROVIDER - CARE PROVIDER_API CALL
FATOUMATA GAGNON  [id number] [place] [address]  Phone: [phone]  Fax: [phone]  Follow Up Time: 2 weeks

## 2024-12-20 NOTE — PROGRESS NOTE ADULT - PROBLEM SELECTOR PLAN 5
ESRD on HD MWF  HD on wednesday; removed 1.5L fluid  CXR fluid overload  makes minimal urine  Nephrology Dr Griffiths consulted    -HD today  -c/w calcium acetate

## 2024-12-20 NOTE — PROGRESS NOTE ADULT - ATTENDING COMMENTS
54M CAD NSTEMI s/p PCI 4/11/24, ESRD HD MWF, HFrEF, recurrent ascites, s/p cholecystectomy p/w cc SOB and reproducible CP, found with troponemia and BNP elevation, admission for NSTEMI/ACS r/o, fluid overload.    AP:  AHRF  NSTEMI/ACS r/o  Fluid overload vs HFrEF exacerbation  ESRD HD MWF  recurrent ascites  s/p cholecystectomy  HTN    -discussed with cards Dr Quick: covering for ACS     -c/w hep gtt, DAPT, prn nitroglycerin    -obtain TTE and nuc stress test    -trend trops  -ekg without lateral twi and poor R wave progression but unchanged from previous  -HD per nephro for fluid optimization     -EPO  -c/w PPI while on triple therapy  -BP well controlled, continue home meds    =================================  I independently reviewed the following tests: ekg without lateral twi and poor R wave progression but unchanged from previous  I discussed management with specialists and the plan of care is described above.  I ordered labs and studies: CBC, BMP  I reviewed the following labs to guide my management:                        7.8    8.05  )-----------( 192      ( 19 Dec 2024 14:50 )             22.3     12-19    135  |  100  |  36[H]  ----------------------------<  131[H]  4.5   |  29  |  4.77[H]    Ca    8.2[L]      19 Dec 2024 05:00  Phos  3.5     12-19  Mg     2.0     12-19    TPro  6.6  /  Alb  2.5[L]  /  TBili  0.5  /  DBili  x   /  AST  23  /  ALT  15  /  AlkPhos  107  12-19 54M CAD NSTEMI s/p PCI 4/11/24, ESRD HD MWF, HFrEF, recurrent ascites, s/p cholecystectomy p/w cc SOB and reproducible CP, found with troponemia and BNP elevation, admission for NSTEMI/ACS r/o, fluid overload.    AP:  AHRF  NSTEMI/ACS r/o  Fluid overload vs HFrEF exacerbation  ESRD HD MWF  recurrent ascites  s/p cholecystectomy  HTN    -CXR 12/20 c/w fluid overload/HF exacerbation  -discussed with cards Dr Quick: covering for ACS     -c/w hep gtt, DAPT, prn nitroglycerin    -obtain TTE and nuc stress test    -trend trops  -ekg without lateral twi and poor R wave progression but unchanged from previous  -HD per nephro for fluid optimization     -EPO  -c/w PPI while on triple therapy  -BP well controlled, continue home meds    =================================  I independently reviewed the following tests: ekg without lateral twi and poor R wave progression but unchanged from previous  I discussed management with specialists and the plan of care is described above.  I ordered labs and studies: CBC, BMP  I reviewed the following labs to guide my management:                        7.8    8.05  )-----------( 192      ( 19 Dec 2024 14:50 )             22.3     12-19    135  |  100  |  36[H]  ----------------------------<  131[H]  4.5   |  29  |  4.77[H]    Ca    8.2[L]      19 Dec 2024 05:00  Phos  3.5     12-19  Mg     2.0     12-19    TPro  6.6  /  Alb  2.5[L]  /  TBili  0.5  /  DBili  x   /  AST  23  /  ALT  15  /  AlkPhos  107  12-19

## 2024-12-20 NOTE — PROGRESS NOTE ADULT - PROBLEM SELECTOR PLAN 4
Baseline 10-11  Hb after cholecystectomy in Nov: 9.3  On this admission 8.4 > 7.8  iron panel: ACD    -monitor CBC  -f/u Hb 2pm

## 2024-12-20 NOTE — PROGRESS NOTE ADULT - SUBJECTIVE AND OBJECTIVE BOX
SUBJECTIVE: **TO UPDATE**  No overnight events. Pt seen at bedside, states that they feel "__________." Pt endorses _____. Pt denies headache, fever, chills, SOB, chest pain, cough, abd pain, n/v/d, constipation, dysuria, urinary frequency, back pain, myalgias, weakness, dizziness, gait disturbance, numbness/tingling, blurry vision.      OBJECTIVE:    T(C): 37.1 (12-20-24 @ 05:16), Max: 37.1 (12-19-24 @ 16:10)  HR: 79 (12-20-24 @ 05:16) (66 - 79)  BP: 139/76 (12-20-24 @ 05:16) (116/62 - 148/74)  RR: 18 (12-20-24 @ 05:16) (15 - 20)  SpO2: 92% (12-20-24 @ 05:16) (90% - 100%)        12-19-24 @ 07:01  -  12-20-24 @ 07:00  --------------------------------------------------------  IN: 980 mL / OUT: 2600 mL / NET: -1620 mL          ***TO BE EDITED***  CONSTITUTIONAL: No apparent distress, resting comfortably  EYES: Pupils symmetric, EOMI, No conjunctival or scleral injection, non-icteric  ENMT: Oral mucosa with moist membranes  RESP: No respiratory distress, no use of accessory muscles; CTA b/l, no crackles or wheezes  CV: RRR, +S1S2, no murmurs appreciated; no peripheral edema  GI: Soft, NTND, no rebound, no guarding  MSK: No digital clubbing or cyanosis; Extremities moving equally without additional effort; gait not appreciated  : deferred  SKIN: No rashes or ulcers noted  NEURO: CN II-XII grossly intact   PSYCH: Appropriate insight/judgment; A+O x 3, mood and affect appropriate, recent/remote memory intact    No Known Allergies      acetaminophen     Tablet .. 650 milliGRAM(s) Oral every 6 hours PRN  aspirin  chewable 81 milliGRAM(s) Oral daily  atorvastatin 10 milliGRAM(s) Oral at bedtime  calcium acetate 667 milliGRAM(s) Oral three times a day with meals  carvedilol 12.5 milliGRAM(s) Oral every 12 hours  clopidogrel Tablet 75 milliGRAM(s) Oral daily  epoetin ignacia-epbx (RETACRIT) Injectable 57079 Unit(s) IV Push <User Schedule>  lisinopril 40 milliGRAM(s) Oral daily  pantoprazole    Tablet 40 milliGRAM(s) Oral before breakfast  traZODone 100 milliGRAM(s) Oral at bedtime                            8.2    7.67  )-----------( 194      ( 20 Dec 2024 05:20 )             24.0     12-20    134[L]  |  99  |  50[H]  ----------------------------<  122[H]  4.8   |  27  |  6.44[H]    Ca    8.4      20 Dec 2024 05:20  Phos  4.1     12-20  Mg     2.3     12-20    TPro  6.6  /  Alb  2.5[L]  /  TBili  0.5  /  DBili  x   /  AST  23  /  ALT  15  /  AlkPhos  107  12-19         SUBJECTIVE: **TO UPDATE**  No overnight events. Pt seen at bedside, states that they feel "__________." Pt endorses _____. Pt denies headache, fever, chills, SOB, chest pain, cough, abd pain, n/v/d, constipation, dysuria, urinary frequency, back pain, myalgias, weakness, dizziness, gait disturbance, numbness/tingling, blurry vision.      OBJECTIVE:    T(C): 37.1 (12-20-24 @ 05:16), Max: 37.1 (12-19-24 @ 16:10)  HR: 79 (12-20-24 @ 05:16) (66 - 79)  BP: 139/76 (12-20-24 @ 05:16) (116/62 - 148/74)  RR: 18 (12-20-24 @ 05:16) (15 - 20)  SpO2: 92% (12-20-24 @ 05:16) (90% - 100%)        12-19-24 @ 07:01  -  12-20-24 @ 07:00  --------------------------------------------------------  IN: 980 mL / OUT: 2600 mL / NET: -1620 mL          ***TO BE EDITED***  CONSTITUTIONAL: In mild distress, learning forward No apparent distress, resting comfortably  EYES: Pupils symmetric, EOMI, No conjunctival or scleral injection, non-icteric  ENMT: Oral mucosa with moist membranes  RESP: Poor inspiratory effort, lung sounds not well heard. No respiratory distress, no use of accessory muscles  CV: RRR, +S1S2, no murmurs appreciated; no peripheral edema  GI: Soft, NTND, no rebound, no guarding  MSK: No digital clubbing or cyanosis; Extremities moving equally without additional effort; gait not appreciated  : deferred  SKIN: No rashes or ulcers noted  NEURO: CN II-XII grossly intact   PSYCH: Appropriate insight/judgment; A+O x 3, mood and affect appropriate, recent/remote memory intact    No Known Allergies      acetaminophen     Tablet .. 650 milliGRAM(s) Oral every 6 hours PRN  aspirin  chewable 81 milliGRAM(s) Oral daily  atorvastatin 10 milliGRAM(s) Oral at bedtime  calcium acetate 667 milliGRAM(s) Oral three times a day with meals  carvedilol 12.5 milliGRAM(s) Oral every 12 hours  clopidogrel Tablet 75 milliGRAM(s) Oral daily  epoetin ignacia-epbx (RETACRIT) Injectable 47539 Unit(s) IV Push <User Schedule>  lisinopril 40 milliGRAM(s) Oral daily  pantoprazole    Tablet 40 milliGRAM(s) Oral before breakfast  traZODone 100 milliGRAM(s) Oral at bedtime                            8.2    7.67  )-----------( 194      ( 20 Dec 2024 05:20 )             24.0     12-20    134[L]  |  99  |  50[H]  ----------------------------<  122[H]  4.8   |  27  |  6.44[H]    Ca    8.4      20 Dec 2024 05:20  Phos  4.1     12-20  Mg     2.3     12-20    TPro  6.6  /  Alb  2.5[L]  /  TBili  0.5  /  DBili  x   /  AST  23  /  ALT  15  /  AlkPhos  107  12-19         SUBJECTIVE: Overnight, pt c/o chest pain with exertion. EKG ordered, no changes. Pt refused blood draw for trops. Pt seen at bedside, states that they feel "worse than yesterday, breathing is harder." Pt endorses improved sx from admission but worsened since yesterday. Pt endorses SOB, orthopnea, L pleuritic chest pain, discomfort with normal/deep breathing. Pt denies headache, fever, chills, cough, abd pain, n/v, weakness, dizziness.      OBJECTIVE:    T(C): 37.1 (12-20-24 @ 05:16), Max: 37.1 (12-19-24 @ 16:10)  HR: 79 (12-20-24 @ 05:16) (66 - 79)  BP: 139/76 (12-20-24 @ 05:16) (116/62 - 148/74)  RR: 18 (12-20-24 @ 05:16) (15 - 20)  SpO2: 92% (12-20-24 @ 05:16) (90% - 100%)        12-19-24 @ 07:01  -  12-20-24 @ 07:00  --------------------------------------------------------  IN: 980 mL / OUT: 2600 mL / NET: -1620 mL          CONSTITUTIONAL: In mild distress, learning forward while sitting on side of bed  EYES: Pupils symmetric, EOMI, No conjunctival or scleral injection, non-icteric  ENMT: Oral mucosa with moist membranes  RESP: Poor inspiratory effort, lung sounds not well heard. No respiratory distress, no use of accessory muscles  CV: RRR, +S1S2, no murmurs appreciated; no peripheral edema  GI: Soft, NTND, no rebound, no guarding  MSK: No digital clubbing or cyanosis; Extremities moving equally without additional effort; gait not appreciated  : deferred  SKIN: No rashes or ulcers noted  NEURO: CN II-XII grossly intact   PSYCH: Appropriate insight/judgment; A+O x 3, mood and affect appropriate, recent/remote memory intact    No Known Allergies      acetaminophen     Tablet .. 650 milliGRAM(s) Oral every 6 hours PRN  aspirin  chewable 81 milliGRAM(s) Oral daily  atorvastatin 10 milliGRAM(s) Oral at bedtime  calcium acetate 667 milliGRAM(s) Oral three times a day with meals  carvedilol 12.5 milliGRAM(s) Oral every 12 hours  clopidogrel Tablet 75 milliGRAM(s) Oral daily  epoetin ignacia-epbx (RETACRIT) Injectable 85819 Unit(s) IV Push <User Schedule>  lisinopril 40 milliGRAM(s) Oral daily  pantoprazole    Tablet 40 milliGRAM(s) Oral before breakfast  traZODone 100 milliGRAM(s) Oral at bedtime                            8.2    7.67  )-----------( 194      ( 20 Dec 2024 05:20 )             24.0     12-20    134[L]  |  99  |  50[H]  ----------------------------<  122[H]  4.8   |  27  |  6.44[H]    Ca    8.4      20 Dec 2024 05:20  Phos  4.1     12-20  Mg     2.3     12-20    TPro  6.6  /  Alb  2.5[L]  /  TBili  0.5  /  DBili  x   /  AST  23  /  ALT  15  /  AlkPhos  107  12-19    Troponin I, High Sensitivity (12.20.24 @ 05:26)   Troponin I, High Sensitivity Result: 1458.2Iron with Total Binding Capacity (12.19.24 @ 05:00)   % Saturation, Iron: 20 %  Iron - Total Binding Capacity.: 143 ug/dL  Iron Total: 29 ug/dL  Unsaturated Iron Binding Capacity: 114 ug/dLFerritin (12.19.24 @ 05:00)   Ferritin: 904 ng/mL

## 2024-12-20 NOTE — PROGRESS NOTE ADULT - SUBJECTIVE AND OBJECTIVE BOX
PATIENT SEEN AND EXAMINED BY JEAN PAUL GABRIEL M.D. ON :- 12/20/24  DATE OF SERVICE:   12/20/24          Interim events noted,Labs ,Radiological studies and Cardiology tests reviewed .    Patient is a 54y old  Male who presents with a chief complaint of AHRF 2/2 volume overload (20 Dec 2024 17:58)      HPI:  54M with PMH HTN. HLD, ESRD on HD MWF via LUE AVF, ascites (requiring repeat paracenteses q4-6wks), NICM with moderate LV dysfunction and CAD NSTEMI 4/7/2024 s/p atherectomy, IVL, SALLIE, intervention of LAD branch D1 (PTCA atherectomy, atherectomy and SALLIE on 4/11/2024), recently admitted from 09/27-12/02 for acute cholecystitis s/p cholecystectomy presented with shortness of breath for 1day. As per pt, after HD session he was short of breath at rest, acute in onset, orthopnea present. Also endorses central chest pain, reproducible. Denies cough, fever and chills, worsening leg edema. As per pt he had PITA drain removed last week outpt, endorses gradual reaccumulation of ascitic fluid after removal of drain.   States he makes minimal urine ~1oz. Patient notes weight gain since recent discharge approximately 6 kg. Follows with Dr. Griffiths nephrology.  In ED, pt was hypoxic to 86% on RA, trop 1.7K, EKG no new changes, CXR fluid overload  s/p bumex 2mg IV, aspirin and nitroglycerin in ED  AS per ED note,  d/w Cardiology fellow Dr. Chambers, troponin leak likely type II demand, however can start heparin until formal cardiology evaluation in AM   .   (19 Dec 2024 03:12)      PAST MEDICAL & SURGICAL HISTORY:  Type 2 diabetes mellitus      Hypertension      End stage renal disease  started HD 2/2019 T, Th, Sat via right chest permacath      Bone spur  right shoulder- hx of - sx done      Anemia      Injury of right wrist  hx of at age 14      History of hyperkalemia  before HD- K-6.2 - to repeat in am of sx, pt had HD today      S/P arthroscopy of right shoulder  2010      History of vascular access device  right chest permacath - 2/2019      H/O right wrist surgery  tendons repair - at age 14      AV fistula      H/O ventral hernia repair      S/P cholecystectomy          PREVIOUS DIAGNOSTIC TESTING:      ECHO  FINDINGS:    STRESS  FINDINGS:    CATHETERIZATION  FINDINGS:    MEDICATIONS  (STANDING):  aspirin  chewable 81 milliGRAM(s) Oral daily  atorvastatin 10 milliGRAM(s) Oral at bedtime  calcium acetate 667 milliGRAM(s) Oral three times a day with meals  carvedilol 12.5 milliGRAM(s) Oral every 12 hours  clopidogrel Tablet 75 milliGRAM(s) Oral daily  epoetin ignacia-epbx (RETACRIT) Injectable 28727 Unit(s) IV Push <User Schedule>  lisinopril 40 milliGRAM(s) Oral daily  pantoprazole    Tablet 40 milliGRAM(s) Oral before breakfast  traZODone 100 milliGRAM(s) Oral at bedtime    MEDICATIONS  (PRN):  acetaminophen     Tablet .. 650 milliGRAM(s) Oral every 6 hours PRN Temp greater or equal to 38C (100.4F), Mild Pain (1 - 3)      FAMILY HISTORY:  FH: hypertension  mother, father- alive    FH: type 2 diabetes  mother, father- alive        SOCIAL HISTORY:    CIGARETTES:    ALCOHOL:    REVIEW OF SYSTEMS:  CONSTITUTIONAL: No fever, weight loss, or fatigue  EYES: No eye pain, visual disturbances, or discharge  ENMT:  No difficulty hearing, tinnitus, vertigo; No sinus or throat pain  NECK: No pain or stiffness  RESPIRATORY: No cough, wheezing, chills or hemoptysis; No shortness of breath  CARDIOVASCULAR: No chest pain, palpitations, dizziness, or leg swelling  GASTROINTESTINAL: No abdominal or epigastric pain. No nausea, vomiting, or hematemesis; No diarrhea or constipation. No melena or hematochezia.  GENITOURINARY: No dysuria, frequency, hematuria, or incontinence  NEUROLOGICAL: No headaches, memory loss, loss of strength, numbness, or tremors  SKIN: No itching, burning, rashes, or lesions   LYMPH NODES: No enlarged glands  ENDOCRINE: No heat or cold intolerance; No hair loss  MUSCULOSKELETAL: No joint pain or swelling; No muscle, back, or extremity pain  PSYCHIATRIC: No depression, anxiety, mood swings, or difficulty sleeping  HEME/LYMPH: No easy bruising, or bleeding gums  ALLERY AND IMMUNOLOGIC: No hives or eczema    Vital Signs Last 24 Hrs  T(C): 37 (20 Dec 2024 20:00), Max: 37.1 (20 Dec 2024 05:16)  T(F): 98.6 (20 Dec 2024 20:00), Max: 98.8 (20 Dec 2024 05:16)  HR: 82 (20 Dec 2024 20:00) (67 - 82)  BP: 155/76 (20 Dec 2024 20:00) (139/76 - 168/80)  BP(mean): --  RR: 17 (20 Dec 2024 20:00) (17 - 19)  SpO2: 100% (20 Dec 2024 18:20) (91% - 100%)    Parameters below as of 20 Dec 2024 18:20  Patient On (Oxygen Delivery Method): nasal cannula  O2 Flow (L/min): 2        PHYSICAL EXAM:  GENERAL: NAD, well-groomed, well-developed  HEAD:  Atraumatic, Normocephalic  EYES: EOMI, PERRLA, conjunctiva and sclera clear  ENMT: No tonsillar erythema, exudates, or enlargement; Moist mucous membranes, Good dentition, No lesions  NECK: Supple, No JVD, Normal thyroid  NERVOUS SYSTEM:  Alert & Oriented X3, Good concentration; Motor Strength 5/5 B/L upper and lower extremities; DTRs 2+ intact and symmetric  CHEST/LUNG: Clear to percussion bilaterally; No rales, rhonchi, wheezing, or rubs  HEART: Regular rate and rhythm; No murmurs, rubs, or gallops  ABDOMEN: Soft, Nontender, Nondistended; Bowel sounds present  EXTREMITIES:  2+ Peripheral Pulses, No clubbing, cyanosis, or edema  LYMPH: No lymphadenopathy noted  SKIN: No rashes or lesions      INTERPRETATION OF TELEMETRY:    ECG:    ALONZODSVASC:     LABS:                        8.2    7.67  )-----------( 194      ( 20 Dec 2024 05:20 )             24.0     12-20    134[L]  |  99  |  50[H]  ----------------------------<  122[H]  4.8   |  27  |  6.44[H]    Ca    8.4      20 Dec 2024 05:20  Phos  4.1     12-20  Mg     2.3     12-20    TPro  6.6  /  Alb  2.5[L]  /  TBili  0.5  /  DBili  x   /  AST  23  /  ALT  15  /  AlkPhos  107  12-19        PT/INR - ( 19 Dec 2024 00:51 )   PT: 14.8 sec;   INR: 1.27 ratio         PTT - ( 20 Dec 2024 05:20 )  PTT:39.8 sec  Urinalysis Basic - ( 20 Dec 2024 05:20 )    Color: x / Appearance: x / SG: x / pH: x  Gluc: 122 mg/dL / Ketone: x  / Bili: x / Urobili: x   Blood: x / Protein: x / Nitrite: x   Leuk Esterase: x / RBC: x / WBC x   Sq Epi: x / Non Sq Epi: x / Bacteria: x      Lipid Panel:   I&O's Summary    19 Dec 2024 07:01  -  20 Dec 2024 07:00  --------------------------------------------------------  IN: 980 mL / OUT: 2600 mL / NET: -1620 mL    20 Dec 2024 07:01  -  20 Dec 2024 22:25  --------------------------------------------------------  IN: 600 mL / OUT: 3100 mL / NET: -2500 mL        RADIOLOGY & ADDITIONAL STUDIES:    < from: TTE W or WO Ultrasound Enhancing Agent (12.20.24 @ 08:03) >     CONCLUSIONS:      1. Left ventricular cavity is moderately dilated. Left ventricular wall thickness is normal. Left ventricular systolic function is mildly decreased with an ejection fraction visually estimated at 40 to 45 %. Global left ventricular hypokinesis.   2. There is moderate (grade 2) left ventricular diastolic dysfunction.   3. Normal right ventricular cavity size, with normal wall thickness, and reduced right ventricular systolic function. Tricuspid annular plane systolic excursion (TAPSE) is 1.3 cm (normal >=1.7 cm).   4. The right atrium is normal in size.   5. Mild mitral regurgitation.   6. Moderate tricuspid regurgitation.   7. Estimated pulmonary artery systolic pressure is 55mmHg, consistent with moderate pulmonary hypertension.   8. Mild aortic regurgitation.   9. Mild to moderate pulmonic regurgitation.  10. Trace pericardial effusion.  11. Right pleural effusion noted.  12. No prior echocardiogram is available for comparison.    < end of copied text >

## 2024-12-20 NOTE — PROGRESS NOTE ADULT - PROBLEM SELECTOR PLAN 5
ESRD on HD MWF  HD on wednesday; removed 1.5L fluid  CXR fluid overload  makes minimal urine  Nephrology Dr Griffiths consulted    -HD today  -c/w calcium acetate ESRD on HD MWF  HD on wednesday; removed 1.5L fluid  CXR fluid overload  makes minimal urine  Nephrology Dr Griffiths consulted    -HD for resumption of schedule  -c/w calcium acetate

## 2024-12-20 NOTE — DISCHARGE NOTE PROVIDER - NSDCCPCAREPLAN_GEN_ALL_CORE_FT
PRINCIPAL DISCHARGE DIAGNOSIS  Diagnosis: Acute hypoxemic respiratory failure  Assessment and Plan of Treatment:       SECONDARY DISCHARGE DIAGNOSES  Diagnosis: Demand ischemia  Assessment and Plan of Treatment:     Diagnosis: Acute pulmonary edema  Assessment and Plan of Treatment:      PRINCIPAL DISCHARGE DIAGNOSIS  Diagnosis: Acute CHF  Assessment and Plan of Treatment: Acute congestive heart failure (CHF) occurs when the heart is unable to pump blood effectively, leading to a buildup of fluid in the lungs and other parts of the body. This can cause symptoms such as shortness of breath, fatigue, and swelling in the legs and abdomen. In your case, the fluid overload associated with CHF is being managed through hemodialysis, a treatment that helps remove excess fluid and waste products from your blood. During hemodialysis sessions, a machine filters your blood to remove the extra fluid, which helps relieve the symptoms of fluid overload and improves your breathing and comfort. It is important to adhere to your hemodialysis schedule and follow dietary recommendations to manage your fluid intake, as these steps are crucial in controlling CHF and maintaining your overall health. If you experience any worsening symptoms, such as increased shortness of breath or swelling, contact your healthcare provider promptly.      SECONDARY DISCHARGE DIAGNOSES  Diagnosis: ESRD on hemodialysis  Assessment and Plan of Treatment: End-stage renal disease (ESRD) is the final stage of chronic kidney disease, where the kidneys have lost nearly all their ability to function effectively. This means they can no longer filter waste products and excess fluids from your blood, which is essential for maintaining a healthy balance of minerals and electrolytes in your body. To manage ESRD, you are undergoing hemodialysis, a treatment that uses a machine to mimic the filtering function of healthy kidneys. During hemodialysis sessions, your blood is circulated through a dialyzer, where waste products and extra fluids are removed before the clean blood is returned to your body. This process helps control blood pressure, balance electrolytes, and prevent complications associated with kidney failure. It's important to attend all scheduled dialysis sessions and follow dietary and fluid restrictions as recommended by your healthcare team, as these measures are vital for maintaining your health and well-being. If you have any questions or concerns about your treatment, don't hesitate to discuss them with your healthcare provider.    Diagnosis: HTN (hypertension)  Assessment and Plan of Treatment: You have high blood pressure. Please continue to taking your medications as prescribed. Please measure your blood pressure at least once daily. Maintain a healthy diet which includes incorporating more vegetables and decreasing the amount of salt (low sodium) and sugar in your diet such as rice, bread, and pasta low sugar, low fat, low sodium diet. Exercise frequently if possible.  Please follow up with your primary care provider within one week of your discharge.    Diagnosis: Elevated troponin  Assessment and Plan of Treatment: Elevated troponin levels in your blood test indicate that there has been some stress or injury to your heart muscle. Troponin is a protein released into the bloodstream when the heart muscle is damaged. In your case, the elevated troponin is likely due to demand ischemia, a condition where the heart requires more oxygen than it's receiving, often due to increased physical or emotional stress, anemia, or other medical conditions. Unlike a heart attack, which is caused by a blockage in the coronary arteries, demand ischemia doesn't involve a direct obstruction but rather an imbalance between oxygen supply and demand. It's important to manage underlying conditions, such as high blood pressure or anemia, and to follow your doctor’s recommendations on lifestyle changes, medications, and possible further testing. If you experience symptoms like chest pain or shortness of breath, seek medical attention promptly. Your healthcare team will work with you to monitor your heart health and address any risk factors.     PRINCIPAL DISCHARGE DIAGNOSIS  Diagnosis: Acute CHF  Assessment and Plan of Treatment: You have a history of NICM with left ventricular dysfunction. During this admission, you were found to have acute congestive heart failure (CHF) occurs when the heart is unable to pump blood effectively, leading to a buildup of fluid in the lungs and other parts of the body. This can cause symptoms such as shortness of breath, fatigue, and swelling in the legs and abdomen. In your case, the fluid overload associated with CHF is being managed through hemodialysis. It is important to adhere to your hemodialysis schedule and follow dietary recommendations to manage your fluid intake, as these steps are crucial in controlling CHF and maintaining your overall health. If you experience any worsening symptoms, such as increased shortness of breath or swelling, contact your healthcare provider promptly.  -  Your heart function was assessed with an echocardiogram and a cardiac stress test. Your stress test results were abnormal, with ejection fraction 24%, decreased motion of your heart walls, and signs of ischemia. You are strongly recommended to follow up with ____________________________________________________________      SECONDARY DISCHARGE DIAGNOSES  Diagnosis: ESRD on hemodialysis  Assessment and Plan of Treatment: You have a history of end-stage renal disease (ESRD), the final stage of chronic kidney disease, where the kidneys have lost nearly all their ability to function effectively. This means they can no longer filter waste products and excess fluids from your blood, which is essential for maintaining a healthy balance of minerals and electrolytes in your body. To manage ESRD, you are undergoing hemodialysis, a treatment that uses a machine to mimic the filtering function of healthy kidneys. During hemodialysis sessions, your blood is circulated through a dialyzer, where waste products and extra fluids are removed before the clean blood is returned to your body. This process helps control blood pressure, balance electrolytes, and prevent complications associated with kidney failure. It's important to attend all scheduled dialysis sessions and follow dietary and fluid restrictions as recommended by your healthcare team, as these measures are vital for maintaining your health and well-being. If you have any questions or concerns about your treatment, don't hesitate to discuss them with your healthcare provider.    Diagnosis: HTN (hypertension)  Assessment and Plan of Treatment: You have high blood pressure. Please continue to taking your medications as prescribed. Please measure your blood pressure at least once daily. Maintain a healthy diet which includes incorporating more vegetables and decreasing the amount of salt (low sodium) and sugar in your diet such as rice, bread, and pasta low sugar, low fat, low sodium diet. Exercise frequently if possible.  Please follow up with your primary care provider within one week of your discharge.    Diagnosis: Elevated troponin  Assessment and Plan of Treatment: Elevated troponin levels in your blood test indicate that there has been some stress or injury to your heart muscle. Troponin is a protein released into the bloodstream when the heart muscle is damaged. In your case, the elevated troponin is likely due to demand ischemia, a condition where the heart requires more oxygen than it's receiving, often due to increased physical or emotional stress, anemia, or other medical conditions. Unlike a heart attack, which is caused by a blockage in the coronary arteries, demand ischemia doesn't involve a direct obstruction but rather an imbalance between oxygen supply and demand. It's important to manage underlying conditions, such as high blood pressure or anemia, and to follow your doctor’s recommendations on lifestyle changes, medications, and possible further testing. If you experience symptoms like chest pain or shortness of breath, seek medical attention promptly. Your healthcare team will work with you to monitor your heart health and address any risk factors.    Diagnosis: HLD (hyperlipidemia)  Assessment and Plan of Treatment: You have history of Hyperlipidemia. In the hosptial, you were continued on your home statin medication. Please continue to take your medication at home as prescribed. Maintain healthy lifestyle, low fat diet, exercise regularly and check your lipid levels routinely.   Please follow up with your PCP in 1 week from discharge.    Diagnosis: CAD S/P percutaneous coronary angioplasty  Assessment and Plan of Treatment: You have history of coronary artery disease which is a narrowing of the arteries of your heart caused by a buildup of plaque made of cholesterol. The narrowing decreased the amount of blood flow to your heart causing chest pain, shortness of breath, sweating.   You are taking aspirin and plavix as home medications which were continued in the hospital.  Please continue to take your medications as prescribed.  Please follow with your PCP/Cardiologist in a week.

## 2024-12-20 NOTE — PROGRESS NOTE ADULT - PROBLEM SELECTOR PLAN 4
Baseline 10-11  Hb after cholecystectomy in Nov: 9.3  On this admission 8.4 > 7.8  iron panel: ACD    -monitor CBC  -f/u Hb 2pm Baseline 10-11  Hb after cholecystectomy in Nov: 9.3  On this admission 8.4 > 7.8>7.8>8.2  iron panel: ACD  Stable  likely 2/2 ESRD    -c/w epo during HD

## 2024-12-20 NOTE — DISCHARGE NOTE PROVIDER - NSDCCAREPROVSEEN_GEN_ALL_CORE_FT
Solo Quick, Hilary Khan, Chandra  Doctor, Neal Mireles, Fiona Galvez, Norman Galeas, Beny Ma, Ronald Packer, Aline Motta

## 2024-12-20 NOTE — PROGRESS NOTE ADULT - ASSESSMENT
54M with PMH HTN. HLD, ESRD on HD MWF via LUE AVF, ascites (requiring repeat paracenteses q4-6wks), NICM with moderate LV dysfunction and CAD NSTEMI 4/7/2024 s/p atherectomy, IVL, SALLIE, intervention of LAD branch D1 (PTCA atherectomy, atherectomy and SALLIE on 4/11/2024), recently admitted from 09/27-12/02 for acute cholecystitis s/p cholecystectomy presented with shortness of breath for 1day. In ED, pt was hypoxic to 86% on RA, trop 1.7K, EKG no new changes, CXR fluid overload. Admitted for AHRF 2/2 fluid overload. DAPT resumed, trending trops, pending cards recs, holding diuretics iso low urine production.

## 2024-12-20 NOTE — PROGRESS NOTE ADULT - ASSESSMENT
1. ESRD on HD (M/W/F)  -s/p UF yesterday with UF 2.0kg; Plan for HD with UF 3.0kg today.  HD orders were written and discussed with dialysis nurse.   -Hemodialysis Renal Diet and Fluid restriction to 1L/day  -Adjust meds to eGFR and avoid IV Gadolinium contrast,NSAIDs, and phosphate enema.  -Monitor Electrolytes daily.  2. HTN:   -bp is low normal; pt takes nifedipine 90mg daily but will hold for now.   and continue coreg 12.5mg bid and lisinopril 40mg daiy. off hydralazine.  -titrate bp meds to keep sbp >110 and < 130  3. Anemia of ESRD:  -continue Epogen 22771 tiw.  -F/u CBC daily  -transfuse if HB < 7.0.  4. Mineral Bone Disease:  -continue phoslo tid.   -monitor phos  5. Sob due to fluid overload with h/o HFrEF (35%)  -UF during HD  -First part of stress test done and 2nd part to be done on Monday.   -monitor daily weight.     d/w patient.

## 2024-12-20 NOTE — PROGRESS NOTE ADULT - SUBJECTIVE AND OBJECTIVE BOX
NEPHROLOGY MEDICAL CARE, [facility] - Dr. Domenic Griffiths/ Dr. Bishnu Amador/ Dr. Fili Smiley/ Dr. Naomie Crowder    Date of Service: 12-20-24    Patient was seen and examined at bedside.    CC: patient is okay and NAD    Vital Signs Last 24 Hrs  T(C): 36.3 (20 Dec 2024 15:00), Max: 37.1 (20 Dec 2024 05:16)  T(F): 97.3 (20 Dec 2024 15:00), Max: 98.8 (20 Dec 2024 05:16)  HR: 67 (20 Dec 2024 15:00) (67 - 79)  BP: 150/73 (20 Dec 2024 15:00) (139/76 - 150/73)  BP(mean): --  RR: 17 (20 Dec 2024 15:00) (17 - 19)  SpO2: 100% (20 Dec 2024 15:00) (90% - 100%)    Parameters below as of 20 Dec 2024 15:00  Patient On (Oxygen Delivery Method): nasal cannula  O2 Flow (L/min): 2      12-19 @ 07:01  -  12-20 @ 07:00  --------------------------------------------------------  IN: 980 mL / OUT: 2600 mL / NET: -1620 mL        PHYSICAL EXAM:  General: No acute respiratory distress.  Eyes: conjunctiva and sclera clear  ENMT: Atraumatic, Normocephalic, supple, No JVD present. Moist mucous membranes  Respiratory:   Bilaterally rales; no rhonchi, wheezing  Cardiovascular: S1S2+; no m/r/g  Gastrointestinal: Soft, no tenderness, Nondistended; Bowel sounds present  Neuro:  Awake, Alert & Oriented X3,   Ext:  2+ pedal edema, No Cyanosis  Skin: No rashes  Dialysis Access::   Left upper arm AVF thrill/bruit+     MEDICATIONS:  MEDICATIONS  (STANDING):  aspirin  chewable 81 milliGRAM(s) Oral daily  atorvastatin 10 milliGRAM(s) Oral at bedtime  calcium acetate 667 milliGRAM(s) Oral three times a day with meals  carvedilol 12.5 milliGRAM(s) Oral every 12 hours  clopidogrel Tablet 75 milliGRAM(s) Oral daily  epoetin ignacia-epbx (RETACRIT) Injectable 66990 Unit(s) IV Push <User Schedule>  lisinopril 40 milliGRAM(s) Oral daily  pantoprazole    Tablet 40 milliGRAM(s) Oral before breakfast  traZODone 100 milliGRAM(s) Oral at bedtime    MEDICATIONS  (PRN):  acetaminophen     Tablet .. 650 milliGRAM(s) Oral every 6 hours PRN Temp greater or equal to 38C (100.4F), Mild Pain (1 - 3)          LABS:                        8.2    7.67  )-----------( 194      ( 20 Dec 2024 05:20 )             24.0     12-20    134[L]  |  99  |  50[H]  ----------------------------<  122[H]  4.8   |  27  |  6.44[H]    Ca    8.4      20 Dec 2024 05:20  Phos  4.1     12-20  Mg     2.3     12-20    TPro  6.6  /  Alb  2.5[L]  /  TBili  0.5  /  DBili  x   /  AST  23  /  ALT  15  /  AlkPhos  107  12-19    PT/INR - ( 19 Dec 2024 00:51 )   PT: 14.8 sec;   INR: 1.27 ratio         PTT - ( 20 Dec 2024 05:20 )  PTT:39.8 sec  Urinalysis Basic - ( 20 Dec 2024 05:20 )    Color: x / Appearance: x / SG: x / pH: x  Gluc: 122 mg/dL / Ketone: x  / Bili: x / Urobili: x   Blood: x / Protein: x / Nitrite: x   Leuk Esterase: x / RBC: x / WBC x   Sq Epi: x / Non Sq Epi: x / Bacteria: x      Magnesium: 2.3 mg/dL (12-20 @ 05:20)  Phosphorus: 4.1 mg/dL (12-20 @ 05:20)    Urine studies    PTH and Vit D:

## 2024-12-20 NOTE — PROGRESS NOTE ADULT - SUBJECTIVE AND OBJECTIVE BOX
SUBJECTIVE: **TO UPDATE**  No overnight events. Pt seen at bedside, states that they feel "__________." Pt endorses _____. Pt denies headache, fever, chills, SOB, chest pain, cough, abd pain, n/v/d, constipation, dysuria, urinary frequency, back pain, myalgias, weakness, dizziness, gait disturbance, numbness/tingling, blurry vision.      OBJECTIVE:    T(C): 37.1 (12-20-24 @ 05:16), Max: 37.1 (12-19-24 @ 16:10)  HR: 79 (12-20-24 @ 05:16) (66 - 79)  BP: 139/76 (12-20-24 @ 05:16) (116/62 - 148/74)  RR: 18 (12-20-24 @ 05:16) (15 - 20)  SpO2: 92% (12-20-24 @ 05:16) (90% - 100%)        12-19-24 @ 07:01  -  12-20-24 @ 07:00  --------------------------------------------------------  IN: 980 mL / OUT: 2600 mL / NET: -1620 mL          ***TO BE EDITED***  CONSTITUTIONAL: No apparent distress, resting comfortably  EYES: Pupils symmetric, EOMI, No conjunctival or scleral injection, non-icteric  ENMT: Oral mucosa with moist membranes  RESP: No respiratory distress, no use of accessory muscles; CTA b/l, no crackles or wheezes  CV: RRR, +S1S2, no murmurs appreciated; no peripheral edema  GI: Soft, NTND, no rebound, no guarding  MSK: No digital clubbing or cyanosis; Extremities moving equally without additional effort; gait not appreciated  : deferred  SKIN: No rashes or ulcers noted  NEURO: CN II-XII grossly intact   PSYCH: Appropriate insight/judgment; A+O x 3, mood and affect appropriate, recent/remote memory intact    No Known Allergies      acetaminophen     Tablet .. 650 milliGRAM(s) Oral every 6 hours PRN  aspirin  chewable 81 milliGRAM(s) Oral daily  atorvastatin 10 milliGRAM(s) Oral at bedtime  calcium acetate 667 milliGRAM(s) Oral three times a day with meals  carvedilol 12.5 milliGRAM(s) Oral every 12 hours  clopidogrel Tablet 75 milliGRAM(s) Oral daily  epoetin ignacia-epbx (RETACRIT) Injectable 83694 Unit(s) IV Push <User Schedule>  lisinopril 40 milliGRAM(s) Oral daily  pantoprazole    Tablet 40 milliGRAM(s) Oral before breakfast  traZODone 100 milliGRAM(s) Oral at bedtime                            8.2    7.67  )-----------( 194      ( 20 Dec 2024 05:20 )             24.0     12-20    134[L]  |  99  |  50[H]  ----------------------------<  122[H]  4.8   |  27  |  6.44[H]    Ca    8.4      20 Dec 2024 05:20  Phos  4.1     12-20  Mg     2.3     12-20    TPro  6.6  /  Alb  2.5[L]  /  TBili  0.5  /  DBili  x   /  AST  23  /  ALT  15  /  AlkPhos  107  12-19

## 2024-12-20 NOTE — PROGRESS NOTE ADULT - ASSESSMENT
54M with PMH HTN. HLD, ESRD on HD MWF via LUE AVF, ascites (requiring repeat paracenteses q4-6wks), NICM with moderate LV dysfunction and CAD NSTEMI 4/7/2024 s/p atherectomy, IVL, SALLIE, intervention of LAD branch D1 (PTCA atherectomy, atherectomy and SALLIE on 4/11/2024), recently admitted from 09/27-12/02 for acute cholecystitis s/p cholecystectomy presented with shortness of breath for 1day. In ED, pt was hypoxic to 86% on RA, trop 1.7K, EKG no new changes, CXR fluid overload. Admitted for AHRF 2/2 fluid overload.    # NSTEMI (non-ST elevation myocardial infarction).   # HF  # ESRD  # CAD s/p stent X3 on april, 24  # HLD  EKG: left ant fascicular block, no new changes  pt c/o central chest pain; reproducible    -started Heparin drip  - f/u TTE   - renal f/u   -  Coreg, hydralazine and lisinopril at home  - statin

## 2024-12-20 NOTE — PROGRESS NOTE ADULT - PROBLEM SELECTOR PLAN 2
p/w SOB and hypoxia to 86% on RA  Flu, COVID, RSV negative  CXR fluid overload  Last HD on Wednesday  s/p bumex X1 in ED  Last echo in [facility] from 2023: LVEF 35-40%, Moderate global left ventricular systolic dysfunction. Grade III-IV diastolic dysfunction (severe).  proBNP >[id number]  Cardio, Dr Quick consulted  Nephrology Dr Griffiths consulted    -pt makes minimal urine; holding off on diuretics for now  -HD today for fluid overload  -Wean off oxygen as tolerated

## 2024-12-20 NOTE — PROGRESS NOTE ADULT - ASSESSMENT
54M with PMH HTN. HLD, ESRD on HD MWF via LUE AVF, ascites (requiring repeat paracenteses q4-6wks), NICM with moderate LV dysfunction and CAD NSTEMI 4/7/2024 s/p atherectomy, IVL, SALLIE, intervention of LAD branch D1 (PTCA atherectomy, atherectomy and SALLIE on 4/11/2024), recently admitted from 09/27-12/02 for acute cholecystitis s/p cholecystectomy presented with shortness of breath for 1day. In ED, pt was hypoxic to 86% on RA, trop 1.7K, EKG no new changes, CXR fluid overload. Admitted for AHRF 2/2 fluid overload. DAPT resumed, trending trops, pending cards recs, holding diuretics iso low urine production. 54M with PMH HTN. HLD, ESRD on HD MWF via LUE AVF, ascites (requiring repeat paracenteses q4-6wks), NICM with moderate LV dysfunction and CAD NSTEMI 4/7/2024 s/p atherectomy, IVL, SALLIE, intervention of LAD branch D1 (PTCA atherectomy, atherectomy and SALLIE on 4/11/2024), recently admitted from 09/27-12/02 for acute cholecystitis s/p cholecystectomy presented with shortness of breath for 1day. In ED, pt was hypoxic to 86% on RA, trop 1.7K, EKG no new changes, CXR fluid overload. Admitted for AHRF 2/2 fluid overload. DAPT resumed, pending tte and stress test, holding diuretics iso low urine production. Got HD 12/18, 12/19.

## 2024-12-20 NOTE — PROGRESS NOTE ADULT - PROBLEM SELECTOR PLAN 1
p/w trop 1.7k>2K>2.8K  EKG: left ant fascicular block, no new changes  pt c/o central chest pain; reproducible  Cardio, Dr Quick consulted    -c/w Heparin drip  -f/u cardio recs  -resumed DAPT p/w trop 1.7k>2K>2.8K  EKG: left ant fascicular block, no new changes  pt c/o central chest pain; reproducible  Cardio, Dr Quick consulted    -c/w Heparin drip  -f/u stress test  -f/u tte  -c/w DAPT

## 2024-12-20 NOTE — DISCHARGE NOTE PROVIDER - HOSPITAL COURSE
54M with PMH HTN. HLD, ESRD on HD MWF via LUE AVF, ascites (requiring repeat paracenteses q4-6wks), NICM with moderate LV dysfunction and CAD NSTEMI 4/7/2024 s/p atherectomy, IVL, SALLIE, intervention of LAD branch D1 (PTCA atherectomy, atherectomy and SALLIE on 4/11/2024), recently admitted from 09/27-12/02 for acute cholecystitis s/p cholecystectomy presented with shortness of breath for 1day. In ED, pt was hypoxic to 86% on RA, trop 1.7K, EKG no new changes, CXR fluid overload. Admitted for AHRF 2/2 fluid overload. DAPT resumed, pending tte and stress test, holding diuretics iso low urine production. Got HD 12/18, 12/19.       Nutritional Assessment:  · Nutritional Assessment	Diet, Regular:   Consistent Carbohydrate {Evening Snacks}  DASH/TLC {Sodium & Cholesterol Restricted}  For patients receiving Renal Replacement - No Protein Restr, No Conc K, No Conc Phos, Low Sodium (RENAL) (12-19-24 @ 03:11) [Active]     Problem/Plan - 1:  ·  Problem: NSTEMI (non-ST elevation myocardial infarction).   ·  Plan: p/w trop 1.7k>2K>2.8K  EKG: left ant fascicular block, no new changes  pt c/o central chest pain; reproducible  CardioDr Quick consulted    -c/w Heparin drip  -f/u stress test  -f/u tte  -c/w DAPT.     Problem/Plan - 2:  ·  Problem: Acute respiratory failure with hypoxia.   ·  Plan: p/w SOB and hypoxia to 86% on RA  Flu, COVID, RSV negative  CXR fluid overload  Last HD on Wednesday  s/p bumex X1 in ED  Last echo in [facility] from 2023: LVEF 35-40%, Moderate global left ventricular systolic dysfunction. Grade III-IV diastolic dysfunction (severe).  proBNP >[id number]  CardioDr Quick consulted  Nephrology Dr Griffiths consulted    -pt makes minimal urine; holding off on diuretics for now  -HD for resumption of schedule  -Wean off oxygen as tolerated  -f/u CXR iso pt not improving.     Problem/Plan - 3:  ·  Problem: CHF exacerbation.   ·  Plan: plan as above.     Problem/Plan - 4:  ·  Problem: Anemia.   ·  Plan: Baseline 10-11  Hb after cholecystectomy in Nov: 9.3  On this admission 8.4 > 7.8>7.8>8.2  iron panel: ACD  Stable  likely 2/2 ESRD    -c/w epo during HD.     Problem/Plan - 5:  ·  Problem: ESRD on hemodialysis.   ·  Plan: ESRD on HD MWF  HD on wednesday; removed 1.5L fluid  CXR fluid overload  makes minimal urine    Nephrology Dr Griffiths consulted, recommended ___________________.    -HD for resumption of schedule  -c/w calcium acetate.    H/o HTN, CAD, HLD continued on coreg, hydralazine, lisinopril, plavix, asa, statin.      heparin drip. 54-year-old male with a history of hypertension (HTN), hyperlipidemia (HLD), end-stage renal disease (ESRD) on hemodialysis (HD) Monday, Wednesday, Friday via left upper extremity arteriovenous fistula (LUE AVF), ascites requiring repeat paracenteses every 4-6 weeks, non-ischemic cardiomyopathy (NICM) with moderate left ventricular dysfunction, and coronary artery disease (CAD) status post non-ST elevation myocardial infarction (NSTEMI) on April 7, 2024, followed by atherectomy, intravascular lithotripsy (IVL), and drug-eluting stent (SALLIE) placement in the LAD branch D1 on April 11, 2024.  **Recent Hospitalization:** Admitted from September 27 to December 2 for acute cholecystitis and underwent cholecystectomy.  **Current Admission:** Presented with shortness of breath for one day.  **Emergency Department Findings:**  - Hypoxia with oxygen saturation at 86% on room air.  - Elevated troponin at 1.7K.  - ECG showed no new changes.  - Chest X-ray indicated fluid overload.  **Admission Diagnosis:** Acute hypoxic respiratory failure secondary to fluid overload.    #**NSTEMI:**     - Initial troponin levels increased to 2.8K.     - ECG showed left anterior fascicular block with no new changes.     - Patient reported central chest pain, which was reproducible.     - Continued on heparin drip and dual antiplatelet therapy (DAPT).     - Follow-up stress test and transthoracic echocardiogram (TTE) which showed____________________________  # **Acute Respiratory Failure with Hypoxia:**     - Shortness of breath and hypoxia identified.     - Negative for flu, COVID-19, and RSV.     - Fluid overload confirmed on chest X-ray.     - Nephrology and Cardiology consulted.     - Held diuretics due to minimal urine output.     - Resumed hemodialysis; weaned off oxygen as tolerated.  #**Congestive Heart Failure (CHF) Exacerbation:**     - Managed with fluid removal during HD.  #. **Anemia:**     - Baseline hemoglobin levels between 10-11 g/dL.     - Current hemoglobin at 8.4 g/dL, stable after cholecystectomy.     - Anemia likely secondary to ESRD.     - Continued erythropoietin (Epogen) during HD.  # **ESRD on Hemodialysis:**     - Hemodialysis continued on [facility] schedule.     - Adjusted medications to be compatible with renal function.     - Followed a renal diet with fluid restriction to 1L/day.     - Daily monitoring of electrolytes and weight.  # **Hypertension:**     - Held nifedipine due to low-normal blood pressure.     - Continued on carvedilol (Coreg) and lisinopril, adjusted as needed.  #. **Mineral Bone Disease:**     - Continued calcium acetate (Phoslo).     - Monitored phosphate levels.  **Medications:**  - Continued on carvedilol, lisinopril, Plavix, aspirin, and statin.  - Heparin drip for anticoagulation.  **Plan:**  - Resume hemodialysis as per schedule with ultrafiltration.  - Monitor for improvement in respiratory status and fluid management.  - Complete stress test and TTE evaluation.  - Adjust treatment plan based on ongoing assessments and test results.    This summary provides a concise overview of the patient's hospital course and management plan. Please ensure all specific details and changes are updated as needed. The patient is a 54-year-old male with a significant medical history, including hypertension, hyperlipidemia, end-stage renal disease on hemodialysis (scheduled for Monday, Wednesday, and Friday via a left upper extremity arteriovenous fistula), ascites requiring repeat paracenteses every 4 to 6 weeks, non-ischemic cardiomyopathy with moderate left ventricular dysfunction, and coronary artery disease. He experienced a non-ST elevation myocardial infarction on April 7, 2024, and subsequently underwent an atherectomy, intravascular lithotripsy, and drug-eluting stent placement in the LAD branch D1 on April 11, 2024.    Recently, the patient was admitted from September 27 to December 2 for acute cholecystitis, during which he underwent a cholecystectomy. He presented again with shortness of breath lasting one day prior to admission. In the emergency department, he was noted to be hypoxic with an oxygen saturation of 86% on room air. Laboratory tests revealed an elevated troponin level of 1.7K, although his ECG did not show any new changes. A chest X-ray indicated fluid overload.    Based on these findings, he was admitted for acute hypoxic respiratory failure secondary to fluid overload. During his hospital stay, his troponin levels babatunde to 2.8K, and he reported central chest pain that was reproducible on examination. Despite an ECG showing left anterior fascicular block, no new changes were noted. He was managed with a heparin drip and continued on dual antiplatelet therapy. Plans were made to follow up with a stress test and transthoracic echocardiogram to further evaluate his cardiac status.    Regarding his respiratory failure, tests for flu, COVID-19, and RSV were negative, and nephrology and cardiology were consulted due to the suspected fluid overload. Diuretics were held due to minimal urine output, and he resumed hemodialysis, with a plan to wean off supplemental oxygen as his condition allowed.    The patient also experienced a CHF exacerbation, which was managed through fluid removal during hemodialysis sessions. His anemia, with baseline hemoglobin levels between 10 to 11 g/dL, was noted to be stable following his cholecystectomy, with current levels at 8.4 g/dL. This anemia was attributed to his ESRD, and erythropoietin was continued during dialysis.    For his ESRD, he remained on his regular hemodialysis schedule, with adjustments made to his medications to accommodate his renal function. He adhered to a renal diet with a fluid restriction of 1 liter per day, and his electrolytes and weight were monitored daily.    In managing his hypertension, nifedipine was temporarily held due to his low-normal blood pressure, while carvedilol and lisinopril were continued with adjustments as necessary. His mineral bone disease was treated with calcium acetate, and phosphate levels were monitored regularly.    Throughout his stay, he continued on medications including carvedilol, lisinopril, Plavix, aspirin, and a statin. He also received a heparin drip for anticoagulation. The plan included resuming his regular hemodialysis schedule with ultrafiltration to manage fluid overload, completing the stress test and echocardiogram, and adjusting his treatment plan based on ongoing assessments and test results.    Pt is stable for discharge. Pt has been advised to follow up as outpatient. Case has deshawn discussed with the attending. The patient is a 54-year-old male with a significant medical history, including hypertension, hyperlipidemia, end-stage renal disease on hemodialysis (scheduled for Monday, Wednesday, and Friday via a left upper extremity arteriovenous fistula), ascites requiring repeat paracenteses every 4 to 6 weeks, non-ischemic cardiomyopathy with moderate left ventricular dysfunction, and coronary artery disease. He experienced a non-ST elevation myocardial infarction on April 7, 2024, and subsequently underwent an atherectomy, intravascular lithotripsy, and drug-eluting stent placement in the LAD branch D1 on April 11, 2024.    Recently, the patient was admitted from September 27 to December 2 for acute cholecystitis, during which he underwent a cholecystectomy. He presented again with shortness of breath lasting one day prior to admission. In the emergency department, he was noted to be hypoxic with an oxygen saturation of 86% on room air. Laboratory tests revealed an elevated troponin level of 1.7K, although his ECG did not show any new changes. A chest X-ray indicated fluid overload. Stress Test Showed__________________________________    Based on these findings, he was admitted for acute hypoxic respiratory failure secondary to fluid overload. During his hospital stay, his troponin levels babatunde to 2.8K, and he reported central chest pain that was reproducible on examination. Despite an ECG showing left anterior fascicular block, no new changes were noted. He was managed with a heparin drip and continued on dual antiplatelet therapy. Plans were made to follow up with a stress test and transthoracic echocardiogram to further evaluate his cardiac status.    Regarding his respiratory failure, tests for flu, COVID-19, and RSV were negative, and nephrology and cardiology were consulted due to the suspected fluid overload. Diuretics were held due to minimal urine output, and he resumed hemodialysis, with a plan to wean off supplemental oxygen as his condition allowed.    The patient also experienced a CHF exacerbation, which was managed through fluid removal during hemodialysis sessions. His anemia, with baseline hemoglobin levels between 10 to 11 g/dL, was noted to be stable following his cholecystectomy, with current levels at 8.4 g/dL. This anemia was attributed to his ESRD, and erythropoietin was continued during dialysis.    For his ESRD, he remained on his regular hemodialysis schedule, with adjustments made to his medications to accommodate his renal function. He adhered to a renal diet with a fluid restriction of 1 liter per day, and his electrolytes and weight were monitored daily.    In managing his hypertension, nifedipine was temporarily held due to his low-normal blood pressure, while carvedilol and lisinopril were continued with adjustments as necessary. His mineral bone disease was treated with calcium acetate, and phosphate levels were monitored regularly.    Throughout his stay, he continued on medications including carvedilol, lisinopril, Plavix, aspirin, and a statin. He also received a heparin drip for anticoagulation. The plan included resuming his regular hemodialysis schedule with ultrafiltration to manage fluid overload, completing the stress test and echocardiogram, and adjusting his treatment plan based on ongoing assessments and test results.    Pt is stable for discharge. Pt has been advised to follow up as outpatient. Case has deshawn discussed with the attending. The patient is a 54-year-old male with a significant medical history, including hypertension, hyperlipidemia, end-stage renal disease on hemodialysis (scheduled for Monday, Wednesday, and Friday via a left upper extremity arteriovenous fistula), ascites requiring repeat paracenteses every 4 to 6 weeks, non-ischemic cardiomyopathy with moderate left ventricular dysfunction, and coronary artery disease. He experienced a non-ST elevation myocardial infarction on April 7, 2024, and subsequently underwent an atherectomy, intravascular lithotripsy, and drug-eluting stent placement in the LAD branch D1 on April 11, 2024.    Recently, the patient was admitted from September 27 to December 2 for acute cholecystitis, during which he underwent a cholecystectomy. He presented again with shortness of breath lasting one day prior to admission. In the emergency department, he was noted to be hypoxic with an oxygen saturation of 86% on room air. Laboratory tests revealed an elevated troponin level of 1.7K, although his ECG did not show any new changes. A chest X-ray indicated fluid overload. Stress Test showed: Abnormal Myocardial Perfusion. Test results:  -Baseline electrocardiogram: normal sinus rhythm at a rate of 66 bpm with q anterior leads,T wave inversion lateral leads.  -Electrocardiogram ischemic changes: 1 mm horizontal st-t depression in inferior leads which started 1 minute into stress test and persisted 4 minutes into recovery.  -Qualitative Perfusion:      - medium-sized, severe defect(s) in the inferior wall that is reversible suggestive of ischemia. - medium-sized, severe defect(s) in the lateral wall that is fixed suggestive of an infarction. - medium-sized, severe defect(s) in the anterior and apical wall that is fixed suggestive of an infarction.  -The left ventricle is severely reduced in function and moderately enlarged in size. The post stress left ventricular EF is 24 %. The stress end diastolic volume is 213 ml and systolic volume is 162 ml.  -The inferior, anterior and lateral walls are hypokinetic.    Based on these findings, he was admitted for acute hypoxic respiratory failure secondary to fluid overload. During his hospital stay, his troponin levels babatunde to 2.8K, and he reported central chest pain that was reproducible on examination. Despite an ECG showing left anterior fascicular block, no new changes were noted. He was managed with a heparin drip and continued on dual antiplatelet therapy. Plans were made to follow up with a stress test and transthoracic echocardiogram to further evaluate his cardiac status.    Regarding his respiratory failure, tests for flu, COVID-19, and RSV were negative, and nephrology and cardiology were consulted due to the suspected fluid overload. Diuretics were held due to minimal urine output, and he resumed hemodialysis, with a plan to wean off supplemental oxygen as his condition allowed.    The patient also experienced a CHF exacerbation, which was managed through fluid removal during hemodialysis sessions. His anemia, with baseline hemoglobin levels between 10 to 11 g/dL, was noted to be stable following his cholecystectomy, with current levels at 8.4 g/dL. This anemia was attributed to his ESRD, and erythropoietin was continued during dialysis.    For his ESRD, he remained on his regular hemodialysis schedule, with adjustments made to his medications to accommodate his renal function. He adhered to a renal diet with a fluid restriction of 1 liter per day, and his electrolytes and weight were monitored daily.    In managing his hypertension, nifedipine was temporarily held due to his low-normal blood pressure, while carvedilol and lisinopril were continued with adjustments as necessary. His mineral bone disease was treated with calcium acetate, and phosphate levels were monitored regularly.    Throughout his stay, he continued on medications including carvedilol, lisinopril, Plavix, aspirin, and a statin. He also received a heparin drip for anticoagulation. The plan included resuming his regular hemodialysis schedule with ultrafiltration to manage fluid overload, completing the stress test and echocardiogram, and adjusting his treatment plan based on ongoing assessments and test results.    Pt is stable for discharge. Pt has been advised to follow up as outpatient. Case has been discussed with the attending.

## 2024-12-20 NOTE — PROGRESS NOTE ADULT - PROBLEM SELECTOR PLAN 1
p/w trop 1.7k>2K>2.8K  EKG: left ant fascicular block, no new changes  pt c/o central chest pain; reproducible  Cardio, Dr Quick consulted    -c/w Heparin drip  -f/u cardio recs  -resumed DAPT

## 2024-12-20 NOTE — PROGRESS NOTE ADULT - PROBLEM SELECTOR PLAN 2
p/w SOB and hypoxia to 86% on RA  Flu, COVID, RSV negative  CXR fluid overload  Last HD on Wednesday  s/p bumex X1 in ED  Last echo in [facility] from 2023: LVEF 35-40%, Moderate global left ventricular systolic dysfunction. Grade III-IV diastolic dysfunction (severe).  proBNP >479685  Cardio, Dr Quick consulted  Nephrology Dr Griffiths consulted    -pt makes minimal urine; holding off on diuretics for now  -HD today for fluid overload  -Wean off oxygen as tolerated p/w SOB and hypoxia to 86% on RA  Flu, COVID, RSV negative  CXR fluid overload  Last HD on Wednesday  s/p bumex X1 in ED  Last echo in [facility] from 2023: LVEF 35-40%, Moderate global left ventricular systolic dysfunction. Grade III-IV diastolic dysfunction (severe).  proBNP >[id number]  Cardio, Dr Quick consulted  Nephrology Dr Griffiths consulted    -pt makes minimal urine; holding off on diuretics for now  -HD for resumption of schedule  -Wean off oxygen as tolerated  -f/u CXR iso pt not improving

## 2024-12-20 NOTE — CHART NOTE - NSCHARTNOTEFT_GEN_A_CORE
Notified by RN that patient is complaining of chest pain limiting his breathing. Patient seen and examined at bedside. Describes nonradiating midsternal chest pain and pressure that started after he walked to the bathroom. On exam, patient with regular rate and rhythm. Trops ordered, but patient declined bloodwork. EKG shows sinus rhythm, no new ST-elevations or ST-depressions noted. Nitroglycerin 0.4 x1 ordered. Primary team to follow in AM.

## 2024-12-21 PROCEDURE — 99233 SBSQ HOSP IP/OBS HIGH 50: CPT

## 2024-12-21 RX ADMIN — TRAZODONE HYDROCHLORIDE 100 MILLIGRAM(S): 150 TABLET ORAL at 22:42

## 2024-12-21 RX ADMIN — CALCIUM ACETATE 667 MILLIGRAM(S): 667 CAPSULE ORAL at 17:31

## 2024-12-21 RX ADMIN — CALCIUM ACETATE 667 MILLIGRAM(S): 667 CAPSULE ORAL at 11:35

## 2024-12-21 RX ADMIN — CALCIUM ACETATE 667 MILLIGRAM(S): 667 CAPSULE ORAL at 08:01

## 2024-12-21 RX ADMIN — Medication 81 MILLIGRAM(S): at 11:30

## 2024-12-21 RX ADMIN — ATORVASTATIN CALCIUM 10 MILLIGRAM(S): 40 TABLET, FILM COATED ORAL at 22:42

## 2024-12-21 RX ADMIN — CLOPIDOGREL BISULFATE 75 MILLIGRAM(S): 75 TABLET, FILM COATED ORAL at 11:30

## 2024-12-21 RX ADMIN — CARVEDILOL 12.5 MILLIGRAM(S): 25 TABLET, FILM COATED ORAL at 17:31

## 2024-12-21 NOTE — PROGRESS NOTE ADULT - PROBLEM SELECTOR PLAN 4
Baseline 10-11  Hb after cholecystectomy in Nov: 9.3  On this admission 8.4 > 7.8>7.8>8.2  iron panel: ACD  Stable  likely 2/2 ESRD    -c/w epo during HD

## 2024-12-21 NOTE — PROGRESS NOTE ADULT - PROBLEM SELECTOR PLAN 1
p/w trop 1.7k>2K>2.8K  EKG: left ant fascicular block, no new changes  pt c/o central chest pain; reproducible  Cardio, Dr Quick consulted    -c/w Heparin drip  -f/u stress test  -f/u tte  -c/w DAPT

## 2024-12-21 NOTE — PROGRESS NOTE ADULT - SUBJECTIVE AND OBJECTIVE BOX
PATIENT SEEN AND EXAMINED BY JEAN PAUL GABRIEL M.D. ON :- 12/21/24  DATE OF SERVICE:    12/21/24         Interim events noted,Labs ,Radiological studies and Cardiology tests reviewed .    Patient is a 54y old  Male who presents with a chief complaint of AHRF 2/2 volume overload (21 Dec 2024 13:19)      HPI:  54M with PMH HTN. HLD, ESRD on HD MWF via LUE AVF, ascites (requiring repeat paracenteses q4-6wks), NICM with moderate LV dysfunction and CAD NSTEMI 4/7/2024 s/p atherectomy, IVL, SALLIE, intervention of LAD branch D1 (PTCA atherectomy, atherectomy and SALLIE on 4/11/2024), recently admitted from 09/27-12/02 for acute cholecystitis s/p cholecystectomy presented with shortness of breath for 1day. As per pt, after HD session he was short of breath at rest, acute in onset, orthopnea present. Also endorses central chest pain, reproducible. Denies cough, fever and chills, worsening leg edema. As per pt he had PITA drain removed last week outpt, endorses gradual reaccumulation of ascitic fluid after removal of drain.   States he makes minimal urine ~1oz. Patient notes weight gain since recent discharge approximately 6 kg. Follows with Dr. Griffiths nephrology.  In ED, pt was hypoxic to 86% on RA, trop 1.7K, EKG no new changes, CXR fluid overload  s/p bumex 2mg IV, aspirin and nitroglycerin in ED  AS per ED note,  d/w Cardiology fellow Dr. Chambers, troponin leak likely type II demand, however can start heparin until formal cardiology evaluation in AM   .   (19 Dec 2024 03:12)      PAST MEDICAL & SURGICAL HISTORY:  Type 2 diabetes mellitus      Hypertension      End stage renal disease  started HD 2/2019 T, Th, Sat via right chest permacath      Bone spur  right shoulder- hx of - sx done      Anemia      Injury of right wrist  hx of at age 14      History of hyperkalemia  before HD- K-6.2 - to repeat in am of sx, pt had HD today      S/P arthroscopy of right shoulder  2010      History of vascular access device  right chest permacath - 2/2019      H/O right wrist surgery  tendons repair - at age 14      AV fistula      H/O ventral hernia repair      S/P cholecystectomy          PREVIOUS DIAGNOSTIC TESTING:      ECHO  FINDINGS:    STRESS  FINDINGS:    CATHETERIZATION  FINDINGS:    MEDICATIONS  (STANDING):  aspirin  chewable 81 milliGRAM(s) Oral daily  atorvastatin 10 milliGRAM(s) Oral at bedtime  calcium acetate 667 milliGRAM(s) Oral three times a day with meals  carvedilol 12.5 milliGRAM(s) Oral every 12 hours  clopidogrel Tablet 75 milliGRAM(s) Oral daily  epoetin ignacia-epbx (RETACRIT) Injectable 39649 Unit(s) IV Push <User Schedule>  lisinopril 40 milliGRAM(s) Oral daily  pantoprazole    Tablet 40 milliGRAM(s) Oral before breakfast  traZODone 100 milliGRAM(s) Oral at bedtime    MEDICATIONS  (PRN):  acetaminophen     Tablet .. 650 milliGRAM(s) Oral every 6 hours PRN Temp greater or equal to 38C (100.4F), Mild Pain (1 - 3)      FAMILY HISTORY:  FH: hypertension  mother, father- alive    FH: type 2 diabetes  mother, father- alive        SOCIAL HISTORY:    CIGARETTES:    ALCOHOL:    REVIEW OF SYSTEMS:  CONSTITUTIONAL: No fever, weight loss, or fatigue  EYES: No eye pain, visual disturbances, or discharge  ENMT:  No difficulty hearing, tinnitus, vertigo; No sinus or throat pain  NECK: No pain or stiffness  RESPIRATORY: No cough, wheezing, chills or hemoptysis; No shortness of breath  CARDIOVASCULAR: No chest pain, palpitations, dizziness, or leg swelling  GASTROINTESTINAL: No abdominal or epigastric pain. No nausea, vomiting, or hematemesis; No diarrhea or constipation. No melena or hematochezia.  GENITOURINARY: No dysuria, frequency, hematuria, or incontinence  NEUROLOGICAL: No headaches, memory loss, loss of strength, numbness, or tremors  SKIN: No itching, burning, rashes, or lesions   LYMPH NODES: No enlarged glands  ENDOCRINE: No heat or cold intolerance; No hair loss  MUSCULOSKELETAL: No joint pain or swelling; No muscle, back, or extremity pain  PSYCHIATRIC: No depression, anxiety, mood swings, or difficulty sleeping  HEME/LYMPH: No easy bruising, or bleeding gums  ALLERY AND IMMUNOLOGIC: No hives or eczema    Vital Signs Last 24 Hrs  T(C): 36.7 (21 Dec 2024 19:33), Max: 37.2 (21 Dec 2024 13:15)  T(F): 98.1 (21 Dec 2024 19:33), Max: 98.9 (21 Dec 2024 13:15)  HR: 71 (21 Dec 2024 19:33) (66 - 75)  BP: 153/72 (21 Dec 2024 19:33) (129/92 - 166/69)  BP(mean): --  RR: 16 (21 Dec 2024 19:33) (16 - 18)  SpO2: 98% (21 Dec 2024 19:33) (90% - 98%)    Parameters below as of 21 Dec 2024 19:33  Patient On (Oxygen Delivery Method): room air          PHYSICAL EXAM:  GENERAL: NAD, well-groomed, well-developed  HEAD:  Atraumatic, Normocephalic  EYES: EOMI, PERRLA, conjunctiva and sclera clear  ENMT: No tonsillar erythema, exudates, or enlargement; Moist mucous membranes, Good dentition, No lesions  NECK: Supple, No JVD, Normal thyroid  NERVOUS SYSTEM:  Alert & Oriented X3, Good concentration; Motor Strength 5/5 B/L upper and lower extremities; DTRs 2+ intact and symmetric  CHEST/LUNG: Clear to percussion bilaterally; No rales, rhonchi, wheezing, or rubs  HEART: Regular rate and rhythm; No murmurs, rubs, or gallops  ABDOMEN: Soft, Nontender, Nondistended; Bowel sounds present  EXTREMITIES:  2+ Peripheral Pulses, No clubbing, cyanosis, or edema  LYMPH: No lymphadenopathy noted  SKIN: No rashes or lesions      INTERPRETATION OF TELEMETRY:    ECG:    CHADSVASC:     LABS:                        8.2    7.67  )-----------( 194      ( 20 Dec 2024 05:20 )             24.0     12-20    134[L]  |  99  |  50[H]  ----------------------------<  122[H]  4.8   |  27  |  6.44[H]    Ca    8.4      20 Dec 2024 05:20  Phos  4.1     12-20  Mg     2.3     12-20          PTT - ( 20 Dec 2024 05:20 )  PTT:39.8 sec  Urinalysis Basic - ( 20 Dec 2024 05:20 )    Color: x / Appearance: x / SG: x / pH: x  Gluc: 122 mg/dL / Ketone: x  / Bili: x / Urobili: x   Blood: x / Protein: x / Nitrite: x   Leuk Esterase: x / RBC: x / WBC x   Sq Epi: x / Non Sq Epi: x / Bacteria: x      Lipid Panel:   I&O's Summary    20 Dec 2024 07:01  -  21 Dec 2024 07:00  --------------------------------------------------------  IN: 600 mL / OUT: 3100 mL / NET: -2500 mL        RADIOLOGY & ADDITIONAL STUDIES:    < from: TTE W or WO Ultrasound Enhancing Agent (12.20.24 @ 08:03) >  CONCLUSIONS:      1. Left ventricular cavity is moderately dilated. Left ventricular wall thickness is normal. Left ventricular systolic function is mildly decreased with an ejection fraction visually estimated at 40 to 45 %. Global left ventricular hypokinesis.   2. There is moderate (grade 2) left ventricular diastolic dysfunction.   3. Normal right ventricular cavity size, with normal wall thickness, and reduced right ventricular systolic function. Tricuspid annular plane systolic excursion (TAPSE) is 1.3 cm (normal >=1.7 cm).   4. The right atrium is normal in size.   5. Mild mitral regurgitation.   6. Moderate tricuspid regurgitation.   7. Estimated pulmonary artery systolic pressure is 55mmHg, consistent with moderate pulmonary hypertension.   8. Mild aortic regurgitation.   9. Mild to moderate pulmonic regurgitation.  10. Trace pericardial effusion.  11. Right pleural effusion noted.  12. No prior echocardiogram is available for comparison.    < end of copied text >

## 2024-12-21 NOTE — PROGRESS NOTE ADULT - ASSESSMENT
1. ESRD on HD (M/W/F)  -s/p HD yesterday with UF 2.5kg; plan for HD tomorrow as per Holiday schedule.  HD orders were written and discussed with dialysis nurse.   -Hemodialysis Renal Diet and Fluid restriction to 1L/day  -Adjust meds to eGFR and avoid IV Gadolinium contrast,NSAIDs, and phosphate enema.  -Monitor Electrolytes daily.  2. HTN:   -bp is low normal; pt takes nifedipine 90mg daily but will hold for now.   and continue coreg 12.5mg bid and lisinopril 40mg daiy. off hydralazine.  -titrate bp meds to keep sbp >110 and < 130  3. Anemia of ESRD:  -continue Epogen 62240 tiw.  -F/u CBC daily  -transfuse if HB < 7.0.  4. Mineral Bone Disease:  -continue phoslo tid.   -monitor phos  5. Sob due to fluid overload with h/o HFrEF (35%)  -UF during HD  -First part of stress test done and 2nd part to be done on Monday.   -monitor daily weight.     d/w patient.

## 2024-12-21 NOTE — PROGRESS NOTE ADULT - ASSESSMENT
54M with PMH HTN. HLD, ESRD on HD MWF via LUE AVF, ascites (requiring repeat paracenteses q4-6wks), NICM with moderate LV dysfunction and CAD NSTEMI 4/7/2024 s/p atherectomy, IVL, SALLIE, intervention of LAD branch D1 (PTCA atherectomy, atherectomy and SALLIE on 4/11/2024), recently admitted from 09/27-12/02 for acute cholecystitis s/p cholecystectomy presented with shortness of breath for 1day. In ED, pt was hypoxic to 86% on RA, trop 1.7K, EKG no new changes, CXR fluid overload. Admitted for AHRF 2/2 fluid overload. DAPT resumed, pending tte and stress test, holding diuretics iso low urine production. Got HD 12/18, 12/19.

## 2024-12-21 NOTE — PROGRESS NOTE ADULT - ASSESSMENT
54M with PMH HTN. HLD, ESRD on HD MWF via LUE AVF, ascites (requiring repeat paracenteses q4-6wks), NICM with moderate LV dysfunction and CAD NSTEMI 4/7/2024 s/p atherectomy, IVL, SALLIE, intervention of LAD branch D1 (PTCA atherectomy, atherectomy and SALLIE on 4/11/2024), recently admitted from 09/27-12/02 for acute cholecystitis s/p cholecystectomy presented with shortness of breath for 1day. In ED, pt was hypoxic to 86% on RA, trop 1.7K, EKG no new changes, CXR fluid overload. Admitted for AHRF 2/2 fluid overload. DAPT resumed, tte 40-45% EF, G2DD, TAPSE 1.3, pending repeat stress test, holding diuretics iso low urine production. Got HD 12/18, 12/19, 12/20

## 2024-12-21 NOTE — PROGRESS NOTE ADULT - SUBJECTIVE AND OBJECTIVE BOX
SUBJECTIVE:  No overnight events. Pt seen walking around the unit off oxygen, appears comfortable. Pt seen at bedside, states that they feel "better today." Pt endorses improvement of fatigue, dyspnea on exertion. Pt denies headache, fever, chills, chest pain, cough, abd pain, n/v, dizziness.      OBJECTIVE:    T(C): 37 (12-21-24 @ 10:35), Max: 37 (12-20-24 @ 20:00)  HR: 75 (12-21-24 @ 10:35) (67 - 82)  BP: 152/72 (12-21-24 @ 10:35) (139/90 - 168/80)  RR: 18 (12-21-24 @ 10:35) (16 - 18)  SpO2: 93% (12-21-24 @ 10:35) (90% - 100%)        12-20-24 @ 07:01  -  12-21-24 @ 07:00  --------------------------------------------------------  IN: 600 mL / OUT: 3100 mL / NET: -2500 mL        CONSTITUTIONAL: No apparent distress  EYES: Pupils symmetric, EOMI, No conjunctival or scleral injection, non-icteric  ENMT: Oral mucosa with moist membranes  RESP: Shallow respirations; No respiratory distress, no use of accessory muscles; CTA b/l, no crackles or wheezes  CV: RRR, +S1S2, no murmurs appreciated; no peripheral edema  GI: Soft, NTND, no rebound, no guarding  MSK: No digital clubbing or cyanosis; Extremities moving equally without additional effort; gait not appreciated  : deferred  SKIN: No rashes or ulcers noted  NEURO: CN II-XII grossly intact   PSYCH: Appropriate insight/judgment; A+O x 3, mood and affect appropriate, recent/remote memory intact    No Known Allergies      acetaminophen     Tablet .. 650 milliGRAM(s) Oral every 6 hours PRN  aspirin  chewable 81 milliGRAM(s) Oral daily  atorvastatin 10 milliGRAM(s) Oral at bedtime  calcium acetate 667 milliGRAM(s) Oral three times a day with meals  carvedilol 12.5 milliGRAM(s) Oral every 12 hours  clopidogrel Tablet 75 milliGRAM(s) Oral daily  epoetin ignacia-epbx (RETACRIT) Injectable 13743 Unit(s) IV Push <User Schedule>  lisinopril 40 milliGRAM(s) Oral daily  pantoprazole    Tablet 40 milliGRAM(s) Oral before breakfast  traZODone 100 milliGRAM(s) Oral at bedtime            Pt refused labs          < from: TTE W or WO Ultrasound Enhancing Agent (12.20.24 @ 08:03) >  CONCLUSIONS:      1. Left ventricular cavity is moderately dilated. Left ventricular wall thickness is normal. Left ventricular systolic function is mildly decreased with an ejection fraction visually estimated at 40 to 45 %. Global left ventricular hypokinesis.   2. There is moderate (grade 2) left ventricular diastolic dysfunction.   3. Normal right ventricular cavity size, with normal wall thickness, and reduced right ventricular systolic function. Tricuspid annular plane systolic excursion (TAPSE) is 1.3 cm (normal >=1.7 cm).   4. The right atrium is normal in size.   5. Mild mitral regurgitation.   6. Moderate tricuspid regurgitation.   7. Estimated pulmonary artery systolic pressure is 55mmHg, consistent with moderate pulmonary hypertension.   8. Mild aortic regurgitation.   9. Mild to moderate pulmonic regurgitation.  10. Trace pericardial effusion.  11. Right pleural effusion noted.  12. No prior echocardiogram is available for comparison.    < end of copied text >

## 2024-12-21 NOTE — PROGRESS NOTE ADULT - PROBLEM SELECTOR PLAN 5
ESRD on HD MWF  HD on wednesday; removed 1.5L fluid  CXR fluid overload  makes minimal urine  Nephrology Dr Griffiths consulted    -HD for resumption of schedule  -c/w calcium acetate

## 2024-12-21 NOTE — PROGRESS NOTE ADULT - PROBLEM SELECTOR PLAN 2
p/w SOB and hypoxia to 86% on RA  Flu, COVID, RSV negative  CXR fluid overload  Last HD on Wednesday  s/p bumex X1 in ED  Last echo in [facility]IE from 2023: LVEF 35-40%, Moderate global left ventricular systolic dysfunction. Grade III-IV diastolic dysfunction (severe).  proBNP >647968  Cardio, Dr Quick consulted  Nephrology Dr Griffiths consulted    -pt makes minimal urine; holding off on diuretics for now  -HD for resumption of schedule  -Wean off oxygen as tolerated  -f/u CXR iso pt not improving

## 2024-12-21 NOTE — PROGRESS NOTE ADULT - PROBLEM SELECTOR PLAN 1
p/w trop 1.7k>2K>2.8K  EKG: left ant fascicular block, no new changes  pt c/o central chest pain; reproducible  Cardio, Dr Quick consulted  TTE:  G2DD, EF 40-45%, global LV hypokinesis, mod PHTN, TAPSE 1.3, mod TR, R pleural effusion  Pt unable to complete stress test 12/20 iso tachypnea, distress. ST rescheduled for monday.    -c/w Heparin drip  -f/u stress test  -c/w DAPT

## 2024-12-21 NOTE — PROGRESS NOTE ADULT - ATTENDING COMMENTS
54M CAD NSTEMI s/p PCI 4/11/24, ESRD HD MWF, HFrEF, recurrent ascites, s/p cholecystectomy p/w cc SOB and reproducible CP, found with troponemia and BNP elevation, admission for NSTEMI/ACS r/o, fluid overload.    AP:  AHRF  NSTEMI/ACS r/o  Fluid overload vs HFrEF exacerbation  ESRD HD MWF  recurrent ascites  s/p cholecystectomy  HTN    -CXR 12/20 c/w fluid overload/HF exacerbation  -discussed with cards Dr Quick: covering for ACS     -s/p hep gtt    -c/w DAPT, prn nitroglycerin    -obtained TTE     -pending stress test Monday  -ekg without lateral twi and poor R wave progression but unchanged from previous  -HD per nephro for fluid optimization     -EPO  -c/w PPI while on triple therapy  -BP well controlled, continue home meds    =================================  I independently reviewed the following tests: N/A  I discussed management with specialists and the plan of care is described above.  I ordered labs and studies: CBC, BMP  I reviewed the following labs to guide my management:                        8.2    7.67  )-----------( 194      ( 20 Dec 2024 05:20 )             24.0     12-20    134[L]  |  99  |  50[H]  ----------------------------<  122[H]  4.8   |  27  |  6.44[H]    Ca    8.4      20 Dec 2024 05:20  Phos  4.1     12-20  Mg     2.3     12-20

## 2024-12-21 NOTE — PROGRESS NOTE ADULT - PROBLEM SELECTOR PLAN 2
p/w SOB and hypoxia to 86% on RA  Flu, COVID, RSV negative  CXR fluid overload  Last HD on Wednesday  s/p bumex X1 in ED  Last echo in [facility] from 2023: LVEF 35-40%, Moderate global left ventricular systolic dysfunction. Grade III-IV diastolic dysfunction (severe).  proBNP >025829  Cardio, Dr Quick consulted  Nephrology Dr Griffiths consulted    -pt makes minimal urine; holding off on diuretics for now  -Wean off oxygen as tolerated

## 2024-12-21 NOTE — PROGRESS NOTE ADULT - SUBJECTIVE AND OBJECTIVE BOX
SUBJECTIVE: **TO UPDATE**  No overnight events. Pt seen at bedside, states that they feel "__________." Pt endorses _____. Pt denies headache, fever, chills, SOB, chest pain, cough, abd pain, n/v/d, constipation, dysuria, urinary frequency, back pain, myalgias, weakness, dizziness, gait disturbance, numbness/tingling, blurry vision.      OBJECTIVE:    T(C): 36.7 (12-21-24 @ 07:54), Max: 37 (12-20-24 @ 20:00)  HR: 67 (12-21-24 @ 07:54) (67 - 82)  BP: 166/69 (12-21-24 @ 07:54) (139/90 - 168/80)  RR: 17 (12-21-24 @ 07:59) (16 - 18)  SpO2: 96% (12-21-24 @ 07:59) (90% - 100%)        12-20-24 @ 07:01  -  12-21-24 @ 07:00  --------------------------------------------------------  IN: 600 mL / OUT: 3100 mL / NET: -2500 mL          ***TO BE EDITED***  CONSTITUTIONAL: No apparent distress, resting comfortably  EYES: Pupils symmetric, EOMI, No conjunctival or scleral injection, non-icteric  ENMT: Oral mucosa with moist membranes  RESP: No respiratory distress, no use of accessory muscles; CTA b/l, no crackles or wheezes  CV: RRR, +S1S2, no murmurs appreciated; no peripheral edema  GI: Soft, NTND, no rebound, no guarding  MSK: No digital clubbing or cyanosis; Extremities moving equally without additional effort; gait not appreciated  : deferred  SKIN: No rashes or ulcers noted  NEURO: CN II-XII grossly intact   PSYCH: Appropriate insight/judgment; A+O x 3, mood and affect appropriate, recent/remote memory intact    No Known Allergies      acetaminophen     Tablet .. 650 milliGRAM(s) Oral every 6 hours PRN  aspirin  chewable 81 milliGRAM(s) Oral daily  atorvastatin 10 milliGRAM(s) Oral at bedtime  calcium acetate 667 milliGRAM(s) Oral three times a day with meals  carvedilol 12.5 milliGRAM(s) Oral every 12 hours  clopidogrel Tablet 75 milliGRAM(s) Oral daily  epoetin ignacia-epbx (RETACRIT) Injectable 95266 Unit(s) IV Push <User Schedule>  lisinopril 40 milliGRAM(s) Oral daily  pantoprazole    Tablet 40 milliGRAM(s) Oral before breakfast  traZODone 100 milliGRAM(s) Oral at bedtime

## 2024-12-22 LAB
ANION GAP SERPL CALC-SCNC: 7 MMOL/L — SIGNIFICANT CHANGE UP (ref 5–17)
BUN SERPL-MCNC: 64 MG/DL — HIGH (ref 7–18)
CALCIUM SERPL-MCNC: 8.8 MG/DL — SIGNIFICANT CHANGE UP (ref 8.4–10.5)
CHLORIDE SERPL-SCNC: 102 MMOL/L — SIGNIFICANT CHANGE UP (ref 96–108)
CO2 SERPL-SCNC: 28 MMOL/L — SIGNIFICANT CHANGE UP (ref 22–31)
CREAT SERPL-MCNC: 7.48 MG/DL — HIGH (ref 0.5–1.3)
EGFR: 8 ML/MIN/1.73M2 — LOW
GLUCOSE SERPL-MCNC: 131 MG/DL — HIGH (ref 70–99)
HCT VFR BLD CALC: 25 % — LOW (ref 39–50)
HGB BLD-MCNC: 8.3 G/DL — LOW (ref 13–17)
MAGNESIUM SERPL-MCNC: 2.1 MG/DL — SIGNIFICANT CHANGE UP (ref 1.6–2.6)
MCHC RBC-ENTMCNC: 32.7 PG — SIGNIFICANT CHANGE UP (ref 27–34)
MCHC RBC-ENTMCNC: 33.2 G/DL — SIGNIFICANT CHANGE UP (ref 32–36)
MCV RBC AUTO: 98.4 FL — SIGNIFICANT CHANGE UP (ref 80–100)
NRBC # BLD: 0 /100 WBCS — SIGNIFICANT CHANGE UP (ref 0–0)
PHOSPHATE SERPL-MCNC: 4 MG/DL — SIGNIFICANT CHANGE UP (ref 2.5–4.5)
PLATELET # BLD AUTO: 210 K/UL — SIGNIFICANT CHANGE UP (ref 150–400)
POTASSIUM SERPL-MCNC: 5 MMOL/L — SIGNIFICANT CHANGE UP (ref 3.5–5.3)
POTASSIUM SERPL-SCNC: 5 MMOL/L — SIGNIFICANT CHANGE UP (ref 3.5–5.3)
RBC # BLD: 2.54 M/UL — LOW (ref 4.2–5.8)
RBC # FLD: 14.6 % — HIGH (ref 10.3–14.5)
SODIUM SERPL-SCNC: 137 MMOL/L — SIGNIFICANT CHANGE UP (ref 135–145)
WBC # BLD: 7.56 K/UL — SIGNIFICANT CHANGE UP (ref 3.8–10.5)
WBC # FLD AUTO: 7.56 K/UL — SIGNIFICANT CHANGE UP (ref 3.8–10.5)

## 2024-12-22 PROCEDURE — 99232 SBSQ HOSP IP/OBS MODERATE 35: CPT

## 2024-12-22 RX ADMIN — CALCIUM ACETATE 667 MILLIGRAM(S): 667 CAPSULE ORAL at 13:28

## 2024-12-22 RX ADMIN — CARVEDILOL 12.5 MILLIGRAM(S): 25 TABLET, FILM COATED ORAL at 17:07

## 2024-12-22 RX ADMIN — CLOPIDOGREL BISULFATE 75 MILLIGRAM(S): 75 TABLET, FILM COATED ORAL at 13:28

## 2024-12-22 RX ADMIN — ACETAMINOPHEN 650 MILLIGRAM(S): 80 SOLUTION/ DROPS ORAL at 23:55

## 2024-12-22 RX ADMIN — CARVEDILOL 12.5 MILLIGRAM(S): 25 TABLET, FILM COATED ORAL at 09:08

## 2024-12-22 RX ADMIN — Medication 81 MILLIGRAM(S): at 13:28

## 2024-12-22 RX ADMIN — ATORVASTATIN CALCIUM 10 MILLIGRAM(S): 40 TABLET, FILM COATED ORAL at 22:55

## 2024-12-22 RX ADMIN — CALCIUM ACETATE 667 MILLIGRAM(S): 667 CAPSULE ORAL at 17:07

## 2024-12-22 RX ADMIN — LISINOPRIL 40 MILLIGRAM(S): 30 TABLET ORAL at 09:08

## 2024-12-22 RX ADMIN — CALCIUM ACETATE 667 MILLIGRAM(S): 667 CAPSULE ORAL at 09:08

## 2024-12-22 RX ADMIN — TRAZODONE HYDROCHLORIDE 100 MILLIGRAM(S): 150 TABLET ORAL at 22:55

## 2024-12-22 RX ADMIN — ACETAMINOPHEN 650 MILLIGRAM(S): 80 SOLUTION/ DROPS ORAL at 22:55

## 2024-12-22 NOTE — PROGRESS NOTE ADULT - SUBJECTIVE AND OBJECTIVE BOX
Interval of present illness: No acute events overnight. Pt seen at bedside. Feeling well    O:  Vital Signs Last 24 Hrs  T(C): 36.3 (22 Dec 2024 14:35), Max: 36.8 (21 Dec 2024 22:51)  T(F): 97.3 (22 Dec 2024 14:35), Max: 98.2 (21 Dec 2024 22:51)  HR: 67 (22 Dec 2024 14:35) (66 - 76)  BP: 148/72 (22 Dec 2024 14:35) (148/72 - 161/90)  BP(mean): 94 (22 Dec 2024 09:11) (94 - 94)  RR: 18 (22 Dec 2024 14:35) (16 - 18)  SpO2: 98% (22 Dec 2024 14:35) (96% - 99%)    Parameters below as of 22 Dec 2024 14:35  Patient On (Oxygen Delivery Method): room air        Gen: NAD  Neuro: alert, answering qs appropriately, moves all extremities  HEENT: anicteric, moist oral mucosa  Neck: supple  Pulm: good inspiratory effort, breathing comfortably  Abd: distended but soft, non-tender  Ext: no edema  Skin: warm, dry      acetaminophen     Tablet .. 650 milliGRAM(s) Oral every 6 hours PRN  aspirin  chewable 81 milliGRAM(s) Oral daily  atorvastatin 10 milliGRAM(s) Oral at bedtime  calcium acetate 667 milliGRAM(s) Oral three times a day with meals  carvedilol 12.5 milliGRAM(s) Oral every 12 hours  clopidogrel Tablet 75 milliGRAM(s) Oral daily  epoetin ignacia-epbx (RETACRIT) Injectable 49944 Unit(s) IV Push <User Schedule>  lisinopril 40 milliGRAM(s) Oral daily  pantoprazole    Tablet 40 milliGRAM(s) Oral before breakfast  traZODone 100 milliGRAM(s) Oral at bedtime                            8.3    7.56  )-----------( 210      ( 22 Dec 2024 08:59 )             25.0       12-22    137  |  102  |  64[H]  ----------------------------<  131[H]  5.0   |  28  |  7.48[H]    Ca    8.8      22 Dec 2024 08:59  Phos  4.0     12-22  Mg     2.1     12-22

## 2024-12-22 NOTE — PROGRESS NOTE ADULT - SUBJECTIVE AND OBJECTIVE BOX
PATIENT SEEN AND EXAMINED BY JEAN PAUL GABRIEL M.D. ON :- 12/22/24  DATE OF SERVICE:    12/22/24         Interim events noted,Labs ,Radiological studies and Cardiology tests reviewed .    Patient is a 54y old  Male who presents with a chief complaint of AHRF 2/2 volume overload (22 Dec 2024 17:07)      HPI:  54M with PMH HTN. HLD, ESRD on HD MWF via LUE AVF, ascites (requiring repeat paracenteses q4-6wks), NICM with moderate LV dysfunction and CAD NSTEMI 4/7/2024 s/p atherectomy, IVL, SALLIE, intervention of LAD branch D1 (PTCA atherectomy, atherectomy and SALLIE on 4/11/2024), recently admitted from 09/27-12/02 for acute cholecystitis s/p cholecystectomy presented with shortness of breath for 1day. As per pt, after HD session he was short of breath at rest, acute in onset, orthopnea present. Also endorses central chest pain, reproducible. Denies cough, fever and chills, worsening leg edema. As per pt he had PITA drain removed last week outpt, endorses gradual reaccumulation of ascitic fluid after removal of drain.   States he makes minimal urine ~1oz. Patient notes weight gain since recent discharge approximately 6 kg. Follows with Dr. Griffiths nephrology.  In ED, pt was hypoxic to 86% on RA, trop 1.7K, EKG no new changes, CXR fluid overload  s/p bumex 2mg IV, aspirin and nitroglycerin in ED  AS per ED note,  d/w Cardiology fellow Dr. Chambers, troponin leak likely type II demand, however can start heparin until formal cardiology evaluation in AM   .   (19 Dec 2024 03:12)      PAST MEDICAL & SURGICAL HISTORY:  Type 2 diabetes mellitus      Hypertension      End stage renal disease  started HD 2/2019 T, Th, Sat via right chest permacath      Bone spur  right shoulder- hx of - sx done      Anemia      Injury of right wrist  hx of at age 14      History of hyperkalemia  before HD- K-6.2 - to repeat in am of sx, pt had HD today      S/P arthroscopy of right shoulder  2010      History of vascular access device  right chest permacath - 2/2019      H/O right wrist surgery  tendons repair - at age 14      AV fistula      H/O ventral hernia repair      S/P cholecystectomy          PREVIOUS DIAGNOSTIC TESTING:      ECHO  FINDINGS:    STRESS  FINDINGS:    CATHETERIZATION  FINDINGS:    MEDICATIONS  (STANDING):  aspirin  chewable 81 milliGRAM(s) Oral daily  atorvastatin 10 milliGRAM(s) Oral at bedtime  calcium acetate 667 milliGRAM(s) Oral three times a day with meals  carvedilol 12.5 milliGRAM(s) Oral every 12 hours  clopidogrel Tablet 75 milliGRAM(s) Oral daily  epoetin ignacia-epbx (RETACRIT) Injectable 83656 Unit(s) IV Push <User Schedule>  lisinopril 40 milliGRAM(s) Oral daily  pantoprazole    Tablet 40 milliGRAM(s) Oral before breakfast  traZODone 100 milliGRAM(s) Oral at bedtime    MEDICATIONS  (PRN):  acetaminophen     Tablet .. 650 milliGRAM(s) Oral every 6 hours PRN Temp greater or equal to 38C (100.4F), Mild Pain (1 - 3)      FAMILY HISTORY:  FH: hypertension  mother, father- alive    FH: type 2 diabetes  mother, father- alive        SOCIAL HISTORY:    CIGARETTES:    ALCOHOL:    REVIEW OF SYSTEMS:  CONSTITUTIONAL: No fever, weight loss, or fatigue  EYES: No eye pain, visual disturbances, or discharge  ENMT:  No difficulty hearing, tinnitus, vertigo; No sinus or throat pain  NECK: No pain or stiffness  RESPIRATORY: No cough, wheezing, chills or hemoptysis; No shortness of breath  CARDIOVASCULAR: No chest pain, palpitations, dizziness, or leg swelling  GASTROINTESTINAL: No abdominal or epigastric pain. No nausea, vomiting, or hematemesis; No diarrhea or constipation. No melena or hematochezia.  GENITOURINARY: No dysuria, frequency, hematuria, or incontinence  NEUROLOGICAL: No headaches, memory loss, loss of strength, numbness, or tremors  SKIN: No itching, burning, rashes, or lesions   LYMPH NODES: No enlarged glands  ENDOCRINE: No heat or cold intolerance; No hair loss  MUSCULOSKELETAL: No joint pain or swelling; No muscle, back, or extremity pain  PSYCHIATRIC: No depression, anxiety, mood swings, or difficulty sleeping  HEME/LYMPH: No easy bruising, or bleeding gums  ALLERY AND IMMUNOLOGIC: No hives or eczema    Vital Signs Last 24 Hrs  T(C): 36.2 (22 Dec 2024 19:15), Max: 36.8 (21 Dec 2024 22:51)  T(F): 97.1 (22 Dec 2024 19:15), Max: 98.2 (21 Dec 2024 22:51)  HR: 70 (22 Dec 2024 19:15) (66 - 76)  BP: 150/74 (22 Dec 2024 19:15) (148/72 - 161/90)  BP(mean): 94 (22 Dec 2024 09:11) (94 - 94)  RR: 18 (22 Dec 2024 19:15) (16 - 18)  SpO2: 95% (22 Dec 2024 19:15) (95% - 99%)    Parameters below as of 22 Dec 2024 19:15  Patient On (Oxygen Delivery Method): room air          PHYSICAL EXAM:  GENERAL: NAD, well-groomed, well-developed  HEAD:  Atraumatic, Normocephalic  EYES: EOMI, PERRLA, conjunctiva and sclera clear  ENMT: No tonsillar erythema, exudates, or enlargement; Moist mucous membranes, Good dentition, No lesions  NECK: Supple, No JVD, Normal thyroid  NERVOUS SYSTEM:  Alert & Oriented X3, Good concentration; Motor Strength 5/5 B/L upper and lower extremities; DTRs 2+ intact and symmetric  CHEST/LUNG: Clear to percussion bilaterally; No rales, rhonchi, wheezing, or rubs  HEART: Regular rate and rhythm; No murmurs, rubs, or gallops  ABDOMEN: Soft, Nontender, Nondistended; Bowel sounds present  EXTREMITIES:  2+ Peripheral Pulses, No clubbing, cyanosis, or edema  LYMPH: No lymphadenopathy noted  SKIN: No rashes or lesions      INTERPRETATION OF TELEMETRY:    ECG:    ALONZOVAS:     LABS:                        8.3    7.56  )-----------( 210      ( 22 Dec 2024 08:59 )             25.0     12-22    137  |  102  |  64[H]  ----------------------------<  131[H]  5.0   |  28  |  7.48[H]    Ca    8.8      22 Dec 2024 08:59  Phos  4.0     12-22  Mg     2.1     12-22            Urinalysis Basic - ( 22 Dec 2024 08:59 )    Color: x / Appearance: x / SG: x / pH: x  Gluc: 131 mg/dL / Ketone: x  / Bili: x / Urobili: x   Blood: x / Protein: x / Nitrite: x   Leuk Esterase: x / RBC: x / WBC x   Sq Epi: x / Non Sq Epi: x / Bacteria: x      Lipid Panel:   I&O's Summary    22 Dec 2024 07:01  -  22 Dec 2024 21:40  --------------------------------------------------------  IN: 800 mL / OUT: 3100 mL / NET: -2300 mL        RADIOLOGY & ADDITIONAL STUDIES:    < from: TTE W or WO Ultrasound Enhancing Agent (12.20.24 @ 08:03) >  CONCLUSIONS:      1. Left ventricular cavity is moderately dilated. Left ventricular wall thickness is normal. Left ventricular systolic function is mildly decreased with an ejection fraction visually estimated at 40 to 45 %. Global left ventricular hypokinesis.   2. There is moderate (grade 2) left ventricular diastolic dysfunction.   3. Normal right ventricular cavity size, with normal wall thickness, and reduced right ventricular systolic function. Tricuspid annular plane systolic excursion (TAPSE) is 1.3 cm (normal >=1.7 cm).   4. The right atrium is normal in size.   5. Mild mitral regurgitation.   6. Moderate tricuspid regurgitation.   7. Estimated pulmonary artery systolic pressure is 55mmHg, consistent with moderate pulmonary hypertension.   8. Mild aortic regurgitation.   9. Mild to moderate pulmonic regurgitation.  10. Trace pericardial effusion.  11. Right pleural effusion noted.  12. No prior echocardiogram is available for comparison.    < end of copied text >

## 2024-12-22 NOTE — PROGRESS NOTE ADULT - ASSESSMENT
1. ESRD on HD (M/W/F)  -HD today with UF 3.0kg as per Holiday schedule.  HD orders were written and discussed with dialysis nurse.   -Hemodialysis Renal Diet and Fluid restriction to 1L/day  -Adjust meds to eGFR and avoid IV Gadolinium contrast,NSAIDs, and phosphate enema.  -Monitor Electrolytes daily.  2. HTN:   -bp is low normal; pt takes nifedipine 90mg daily but will hold for now.   and continue coreg 12.5mg bid and lisinopril 40mg daiy. off hydralazine.  -titrate bp meds to keep sbp >110 and < 130  3. Anemia of ESRD:  -continue Epogen 92498 tiw.  -F/u CBC daily  -transfuse if HB < 7.0.  4. Mineral Bone Disease:  -continue phoslo tid.   -monitor phos  5. Sob due to fluid overload with h/o HFrEF (35%)  -UF during HD  -First part of stress test done and 2nd part to be done on Monday.   -monitor daily weight.     d/w patient.

## 2024-12-22 NOTE — PROGRESS NOTE ADULT - SUBJECTIVE AND OBJECTIVE BOX
NEPHROLOGY MEDICAL CARE, [facility] - Dr. Domenic Griffiths/ Dr. Bishnu Amador/ Dr. Fili Smiley/ Dr. Naomie Crowder    Date of Service: 12-22-24    Patient was seen and examined at bedside.    CC: patient is okay and NAD    Vital Signs Last 24 Hrs  T(C): 36.2 (22 Dec 2024 09:30), Max: 37.2 (21 Dec 2024 13:15)  T(F): 97.1 (22 Dec 2024 09:30), Max: 98.9 (21 Dec 2024 13:15)  HR: 67 (22 Dec 2024 09:30) (66 - 71)  BP: 161/90 (22 Dec 2024 09:30) (129/92 - 161/90)  BP(mean): 94 (22 Dec 2024 09:11) (94 - 94)  RR: 18 (22 Dec 2024 09:30) (16 - 18)  SpO2: 96% (22 Dec 2024 09:30) (94% - 98%)    Parameters below as of 22 Dec 2024 09:30  Patient On (Oxygen Delivery Method): room air        12-22 @ 07:01  -  12-22 @ 12:47  --------------------------------------------------------  IN: 200 mL / OUT: 0 mL / NET: 200 mL        PHYSICAL EXAM:  General: No acute respiratory distress.  Eyes: conjunctiva and sclera clear  ENMT: Atraumatic, Normocephalic, supple, No JVD present. Moist mucous membranes  Respiratory:   Bilaterally rales; no rhonchi, wheezing  Cardiovascular: S1S2+; no m/r/g  Gastrointestinal: Soft, no tenderness, Nondistended; Bowel sounds present  Neuro:  Awake, Alert & Oriented X3,   Ext:  1+ pedal edema, No Cyanosis  Skin: No rashes  Dialysis Access::   Left upper arm AVF thrill/bruit+     MEDICATIONS:  MEDICATIONS  (STANDING):  aspirin  chewable 81 milliGRAM(s) Oral daily  atorvastatin 10 milliGRAM(s) Oral at bedtime  calcium acetate 667 milliGRAM(s) Oral three times a day with meals  carvedilol 12.5 milliGRAM(s) Oral every 12 hours  clopidogrel Tablet 75 milliGRAM(s) Oral daily  epoetin ignacia-epbx (RETACRIT) Injectable 43267 Unit(s) IV Push <User Schedule>  lisinopril 40 milliGRAM(s) Oral daily  pantoprazole    Tablet 40 milliGRAM(s) Oral before breakfast  traZODone 100 milliGRAM(s) Oral at bedtime    MEDICATIONS  (PRN):  acetaminophen     Tablet .. 650 milliGRAM(s) Oral every 6 hours PRN Temp greater or equal to 38C (100.4F), Mild Pain (1 - 3)          LABS:                        8.3    7.56  )-----------( 210      ( 22 Dec 2024 08:59 )             25.0     12-22    137  |  102  |  64[H]  ----------------------------<  131[H]  5.0   |  28  |  7.48[H]    Ca    8.8      22 Dec 2024 08:59  Phos  4.0     12-22  Mg     2.1     12-22        Urinalysis Basic - ( 22 Dec 2024 08:59 )    Color: x / Appearance: x / SG: x / pH: x  Gluc: 131 mg/dL / Ketone: x  / Bili: x / Urobili: x   Blood: x / Protein: x / Nitrite: x   Leuk Esterase: x / RBC: x / WBC x   Sq Epi: x / Non Sq Epi: x / Bacteria: x      Magnesium: 2.1 mg/dL (12-22 @ 08:59)  Phosphorus: 4.0 mg/dL (12-22 @ 08:59)    Urine studies    PTH and Vit D:

## 2024-12-22 NOTE — PROGRESS NOTE ADULT - ASSESSMENT
54M CAD NSTEMI s/p PCI 4/11/24, ESRD HD MWF, HFrEF, recurrent ascites, s/p cholecystectomy p/w cc SOB and reproducible CP, found with troponemia and BNP elevation, admission for NSTEMI/ACS r/o, fluid overload.    AP:  AHRF  NSTEMI/ACS r/o  Fluid overload vs HFrEF exacerbation  ESRD HD MWF  recurrent ascites  s/p cholecystectomy  HTN    -CXR 12/20 c/w fluid overload/HF exacerbation  -discussed with cards Dr Quick: covering for ACS     -s/p hep gtt    -c/w DAPT, statin, prn nitroglycerin    -obtained TTE     -pending stress test Monday  -ekg with lateral twi and poor R wave progression but unchanged from previous  -HD per nephro for fluid optimization     -EPO  -c/w PPI   -BP well controlled, continue home meds    =================================  I independently reviewed the following tests: N/A  I discussed management with specialists and the plan of care is described above.  I ordered labs and studies: CBC, BMP  I reviewed the following labs to guide my management:                        8.3    7.56  )-----------( 210      ( 22 Dec 2024 08:59 )             25.0     12-22    137  |  102  |  64[H]  ----------------------------<  131[H]  5.0   |  28  |  7.48[H]    Ca    8.8      22 Dec 2024 08:59  Phos  4.0     12-22  Mg     2.1     12-22

## 2024-12-23 ENCOUNTER — TRANSCRIPTION ENCOUNTER (OUTPATIENT)
Age: 54
End: 2024-12-23

## 2024-12-23 ENCOUNTER — INPATIENT (INPATIENT)
Facility: HOSPITAL | Age: 54
LOS: 0 days | Discharge: ANOTHER HOSPITAL | End: 2024-12-24
Attending: INTERNAL MEDICINE | Admitting: INTERNAL MEDICINE
Payer: MEDICARE

## 2024-12-23 VITALS
TEMPERATURE: 98 F | WEIGHT: 195.11 LBS | DIASTOLIC BLOOD PRESSURE: 75 MMHG | SYSTOLIC BLOOD PRESSURE: 155 MMHG | HEIGHT: 69 IN | OXYGEN SATURATION: 94 % | HEART RATE: 72 BPM | RESPIRATION RATE: 14 BRPM

## 2024-12-23 VITALS
SYSTOLIC BLOOD PRESSURE: 152 MMHG | TEMPERATURE: 98 F | HEART RATE: 64 BPM | OXYGEN SATURATION: 98 % | DIASTOLIC BLOOD PRESSURE: 72 MMHG | RESPIRATION RATE: 18 BRPM

## 2024-12-23 DIAGNOSIS — Z98.890 OTHER SPECIFIED POSTPROCEDURAL STATES: Chronic | ICD-10-CM

## 2024-12-23 DIAGNOSIS — Z90.49 ACQUIRED ABSENCE OF OTHER SPECIFIED PARTS OF DIGESTIVE TRACT: Chronic | ICD-10-CM
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
## 2024-12-23 DIAGNOSIS — I77.0 ARTERIOVENOUS FISTULA, ACQUIRED: Chronic | ICD-10-CM

## 2024-12-23 DIAGNOSIS — I50.9 HEART FAILURE, UNSPECIFIED: ICD-10-CM

## 2024-12-23 LAB
HCT VFR BLD CALC: 24.2 % — LOW (ref 39–50)
HGB BLD-MCNC: 7.8 G/DL — LOW (ref 13–17)
MCHC RBC-ENTMCNC: 32 PG — SIGNIFICANT CHANGE UP (ref 27–34)
MCHC RBC-ENTMCNC: 32.2 G/DL — SIGNIFICANT CHANGE UP (ref 32–36)
MCV RBC AUTO: 99.2 FL — SIGNIFICANT CHANGE UP (ref 80–100)
NRBC # BLD: 0 /100 WBCS — SIGNIFICANT CHANGE UP (ref 0–0)
NRBC # FLD: 0 K/UL — SIGNIFICANT CHANGE UP (ref 0–0)
PLATELET # BLD AUTO: 210 K/UL — SIGNIFICANT CHANGE UP (ref 150–400)
RBC # BLD: 2.44 M/UL — LOW (ref 4.2–5.8)
RBC # FLD: 15.4 % — HIGH (ref 10.3–14.5)
WBC # BLD: 7.32 K/UL — SIGNIFICANT CHANGE UP (ref 3.8–10.5)
WBC # FLD AUTO: 7.32 K/UL — SIGNIFICANT CHANGE UP (ref 3.8–10.5)

## 2024-12-23 PROCEDURE — 99261: CPT

## 2024-12-23 PROCEDURE — 85025 COMPLETE CBC W/AUTO DIFF WBC: CPT

## 2024-12-23 PROCEDURE — 85730 THROMBOPLASTIN TIME PARTIAL: CPT

## 2024-12-23 PROCEDURE — 85610 PROTHROMBIN TIME: CPT

## 2024-12-23 PROCEDURE — 87637 SARSCOV2&INF A&B&RSV AMP PRB: CPT

## 2024-12-23 PROCEDURE — 83550 IRON BINDING TEST: CPT

## 2024-12-23 PROCEDURE — 36415 COLL VENOUS BLD VENIPUNCTURE: CPT

## 2024-12-23 PROCEDURE — 93306 TTE W/DOPPLER COMPLETE: CPT

## 2024-12-23 PROCEDURE — 82728 ASSAY OF FERRITIN: CPT

## 2024-12-23 PROCEDURE — 84100 ASSAY OF PHOSPHORUS: CPT

## 2024-12-23 PROCEDURE — 94010 BREATHING CAPACITY TEST: CPT | Mod: 26

## 2024-12-23 PROCEDURE — 83540 ASSAY OF IRON: CPT

## 2024-12-23 PROCEDURE — 84484 ASSAY OF TROPONIN QUANT: CPT

## 2024-12-23 PROCEDURE — 93010 ELECTROCARDIOGRAM REPORT: CPT

## 2024-12-23 PROCEDURE — 99152 MOD SED SAME PHYS/QHP 5/>YRS: CPT

## 2024-12-23 PROCEDURE — 93005 ELECTROCARDIOGRAM TRACING: CPT

## 2024-12-23 PROCEDURE — 93017 CV STRESS TEST TRACING ONLY: CPT

## 2024-12-23 PROCEDURE — 87340 HEPATITIS B SURFACE AG IA: CPT

## 2024-12-23 PROCEDURE — 78452 HT MUSCLE IMAGE SPECT MULT: CPT | Mod: MC

## 2024-12-23 PROCEDURE — 99285 EMERGENCY DEPT VISIT HI MDM: CPT

## 2024-12-23 PROCEDURE — 80053 COMPREHEN METABOLIC PANEL: CPT

## 2024-12-23 PROCEDURE — A9502: CPT

## 2024-12-23 PROCEDURE — 83735 ASSAY OF MAGNESIUM: CPT

## 2024-12-23 PROCEDURE — 99233 SBSQ HOSP IP/OBS HIGH 50: CPT

## 2024-12-23 PROCEDURE — 85027 COMPLETE CBC AUTOMATED: CPT

## 2024-12-23 PROCEDURE — 93458 L HRT ARTERY/VENTRICLE ANGIO: CPT | Mod: 26

## 2024-12-23 PROCEDURE — 83880 ASSAY OF NATRIURETIC PEPTIDE: CPT

## 2024-12-23 PROCEDURE — 96374 THER/PROPH/DIAG INJ IV PUSH: CPT

## 2024-12-23 PROCEDURE — 71045 X-RAY EXAM CHEST 1 VIEW: CPT

## 2024-12-23 PROCEDURE — 80048 BASIC METABOLIC PNL TOTAL CA: CPT

## 2024-12-23 RX ORDER — ASPIRIN 81 MG
81 TABLET, DELAYED RELEASE (ENTERIC COATED) ORAL DAILY
Refills: 0 | Status: DISCONTINUED | OUTPATIENT
Start: 2024-12-23 | End: 2024-12-24

## 2024-12-23 RX ORDER — CALCIUM ACETATE 667 MG/1
667 CAPSULE ORAL
Refills: 0 | Status: DISCONTINUED | OUTPATIENT
Start: 2024-12-23 | End: 2024-12-24

## 2024-12-23 RX ORDER — CLOPIDOGREL BISULFATE 75 MG/1
1 TABLET, FILM COATED ORAL
Qty: 0 | Refills: 0 | DISCHARGE
Start: 2024-12-23

## 2024-12-23 RX ORDER — ASPIRIN 81 MG
1 TABLET, DELAYED RELEASE (ENTERIC COATED) ORAL
Refills: 0 | DISCHARGE

## 2024-12-23 RX ORDER — CARVEDILOL 25 MG/1
12.5 TABLET, FILM COATED ORAL EVERY 12 HOURS
Refills: 0 | Status: DISCONTINUED | OUTPATIENT
Start: 2024-12-23 | End: 2024-12-24

## 2024-12-23 RX ORDER — EPOETIN ALFA 2000 [IU]/ML
10000 SOLUTION INTRAVENOUS; SUBCUTANEOUS
Refills: 0 | Status: DISCONTINUED | OUTPATIENT
Start: 2024-12-23 | End: 2024-12-24

## 2024-12-23 RX ORDER — NIFEDIPINE 60 MG/1
1 TABLET, EXTENDED RELEASE ORAL
Refills: 0 | DISCHARGE

## 2024-12-23 RX ORDER — FUROSEMIDE 40 MG/1
1 TABLET ORAL
Refills: 0 | DISCHARGE

## 2024-12-23 RX ORDER — CLOPIDOGREL BISULFATE 75 MG/1
75 TABLET, FILM COATED ORAL DAILY
Refills: 0 | Status: DISCONTINUED | OUTPATIENT
Start: 2024-12-23 | End: 2024-12-24

## 2024-12-23 RX ORDER — CARVEDILOL 25 MG/1
1 TABLET, FILM COATED ORAL
Qty: 0 | Refills: 0 | DISCHARGE
Start: 2024-12-23

## 2024-12-23 RX ORDER — LISINOPRIL 30 MG/1
40 TABLET ORAL DAILY
Refills: 0 | Status: DISCONTINUED | OUTPATIENT
Start: 2024-12-23 | End: 2024-12-24

## 2024-12-23 RX ORDER — LISINOPRIL 30 MG/1
1 TABLET ORAL
Refills: 0 | DISCHARGE

## 2024-12-23 RX ORDER — FUROSEMIDE 20 MG
1 TABLET ORAL
Refills: 0 | DISCHARGE

## 2024-12-23 RX ORDER — SODIUM CHLORIDE 9 MG/ML
3 INJECTION, SOLUTION INTRAMUSCULAR; INTRAVENOUS; SUBCUTANEOUS EVERY 8 HOURS
Refills: 0 | Status: DISCONTINUED | OUTPATIENT
Start: 2024-12-23 | End: 2024-12-24

## 2024-12-23 RX ORDER — HEPARIN SODIUM 1000 [USP'U]/ML
INJECTION, SOLUTION INTRAVENOUS; SUBCUTANEOUS
Qty: 25000 | Refills: 0 | Status: DISCONTINUED | OUTPATIENT
Start: 2024-12-24 | End: 2024-12-24

## 2024-12-23 RX ORDER — HEPARIN SODIUM 1000 [USP'U]/ML
5300 INJECTION, SOLUTION INTRAVENOUS; SUBCUTANEOUS EVERY 6 HOURS
Refills: 0 | Status: DISCONTINUED | OUTPATIENT
Start: 2024-12-23 | End: 2024-12-24

## 2024-12-23 RX ORDER — ATORVASTATIN CALCIUM 40 MG/1
80 TABLET, FILM COATED ORAL AT BEDTIME
Refills: 0 | Status: DISCONTINUED | OUTPATIENT
Start: 2024-12-23 | End: 2024-12-24

## 2024-12-23 RX ORDER — B COMPLEX, C NO.20/FOLIC ACID 1 MG
1 CAPSULE ORAL
Refills: 0 | DISCHARGE

## 2024-12-23 RX ORDER — TRAZODONE HYDROCHLORIDE 150 MG/1
1 TABLET ORAL
Qty: 0 | Refills: 0 | DISCHARGE
Start: 2024-12-23

## 2024-12-23 RX ORDER — CALCIUM ACETATE 667 MG/1
667 CAPSULE ORAL
Refills: 0 | DISCHARGE

## 2024-12-23 RX ORDER — HYDRALAZINE HYDROCHLORIDE 10 MG/1
1 TABLET ORAL
Refills: 0 | DISCHARGE

## 2024-12-23 RX ORDER — LOVASTATIN 10 MG
1 TABLET ORAL
Refills: 0 | DISCHARGE

## 2024-12-23 RX ORDER — CLOPIDOGREL BISULFATE 75 MG/1
1 TABLET, FILM COATED ORAL
Refills: 0 | DISCHARGE

## 2024-12-23 RX ORDER — PANTOPRAZOLE 40 MG/1
1 TABLET, DELAYED RELEASE ORAL
Qty: 0 | Refills: 0 | DISCHARGE
Start: 2024-12-23

## 2024-12-23 RX ORDER — PANTOPRAZOLE 40 MG/1
40 TABLET, DELAYED RELEASE ORAL
Refills: 0 | Status: DISCONTINUED | OUTPATIENT
Start: 2024-12-24 | End: 2024-12-24

## 2024-12-23 RX ORDER — ASPIRIN 81 MG
1 TABLET, DELAYED RELEASE (ENTERIC COATED) ORAL
Qty: 0 | Refills: 0 | DISCHARGE
Start: 2024-12-23

## 2024-12-23 RX ORDER — TRAZODONE HYDROCHLORIDE 150 MG/1
100 TABLET ORAL AT BEDTIME
Refills: 0 | Status: DISCONTINUED | OUTPATIENT
Start: 2024-12-23 | End: 2024-12-24

## 2024-12-23 RX ORDER — CALCIUM ACETATE 667 MG/1
1 CAPSULE ORAL
Refills: 0 | DISCHARGE

## 2024-12-23 RX ADMIN — CARVEDILOL 12.5 MILLIGRAM(S): 25 TABLET, FILM COATED ORAL at 07:38

## 2024-12-23 RX ADMIN — CLOPIDOGREL BISULFATE 75 MILLIGRAM(S): 75 TABLET, FILM COATED ORAL at 12:05

## 2024-12-23 RX ADMIN — CALCIUM ACETATE 667 MILLIGRAM(S): 667 CAPSULE ORAL at 08:23

## 2024-12-23 RX ADMIN — LISINOPRIL 40 MILLIGRAM(S): 30 TABLET ORAL at 07:38

## 2024-12-23 RX ADMIN — TRAZODONE HYDROCHLORIDE 100 MILLIGRAM(S): 150 TABLET ORAL at 22:37

## 2024-12-23 RX ADMIN — PANTOPRAZOLE 40 MILLIGRAM(S): 40 TABLET, DELAYED RELEASE ORAL at 08:38

## 2024-12-23 RX ADMIN — CALCIUM ACETATE 667 MILLIGRAM(S): 667 CAPSULE ORAL at 12:05

## 2024-12-23 RX ADMIN — CALCIUM ACETATE 667 MILLIGRAM(S): 667 CAPSULE ORAL at 22:37

## 2024-12-23 RX ADMIN — Medication 81 MILLIGRAM(S): at 12:05

## 2024-12-23 RX ADMIN — ATORVASTATIN CALCIUM 80 MILLIGRAM(S): 40 TABLET, FILM COATED ORAL at 22:37

## 2024-12-23 RX ADMIN — SODIUM CHLORIDE 3 MILLILITER(S): 9 INJECTION, SOLUTION INTRAMUSCULAR; INTRAVENOUS; SUBCUTANEOUS at 22:05

## 2024-12-23 NOTE — PROGRESS NOTE ADULT - ASSESSMENT
1. ESRD on HD (M/W/F)  -s/p HD yesterday with UF 2.3kg as per Holiday schedule.  Plan for HD tomorrow at [facility]   -HD orders were written and discussed with dialysis nurse.   -Hemodialysis Renal Diet and Fluid restriction to 1L/day  -Adjust meds to eGFR and avoid IV Gadolinium contrast,NSAIDs, and phosphate enema.  -Monitor Electrolytes daily.  2. HTN:   -bp acceptable; pt takes nifedipine 90mg daily but will hold for now.   and continue coreg 12.5mg bid and lisinopril 40mg daiy. off hydralazine.  -titrate bp meds to keep sbp >110 and < 130  3. Anemia of ESRD:  -continue Epogen 84664 tiw.  -F/u CBC daily  -transfuse if HB < 7.0.  4. Mineral Bone Disease:  -continue phoslo tid.   -monitor phos  5. Sob due to fluid overload with h/o HFrEF (35%)  -UF during HD  -Stress test shows ischemia so patient will be transferred to [facility] for cardiac cath today.   -monitor daily weight.     d/w patient.

## 2024-12-23 NOTE — H&P CARDIOLOGY - COMMENTS
Pre Procedural Sedation Evaluation    Urine pregnancy: N/A  Dentures: None  Last PO intake: 12/23 @1PM  Obstructive sleep apnea: No  Aspiration risk: No  Mallampati score: 2  ASA Classification: 2  Prior Sedative or Anesthesia Experience: No complications  Informed consent by responsible adult: Yes  Responsible adult escort: Yes  Based on today's assessment, anesthesia consult requested: No

## 2024-12-23 NOTE — PROGRESS NOTE ADULT - SUBJECTIVE AND OBJECTIVE BOX
SUBJECTIVE: **TO UPDATE**  No overnight events. Pt seen at bedside, states that they feel "__________." Pt endorses _____. Pt denies headache, fever, chills, SOB, chest pain, cough, abd pain, n/v/d, constipation, dysuria, urinary frequency, back pain, myalgias, weakness, dizziness, gait disturbance, numbness/tingling, blurry vision.      OBJECTIVE:    T(C): 36.8 (12-22-24 @ 22:55), Max: 36.8 (12-22-24 @ 22:55)  HR: 72 (12-22-24 @ 22:55) (67 - 76)  BP: 153/69 (12-22-24 @ 22:55) (148/72 - 160/77)  RR: 18 (12-22-24 @ 22:55) (18 - 18)  SpO2: 97% (12-22-24 @ 22:55) (95% - 99%)        12-22-24 @ 07:01  -  12-23-24 @ 07:00  --------------------------------------------------------  IN: 800 mL / OUT: 3100 mL / NET: -2300 mL          ***TO BE EDITED***  CONSTITUTIONAL: No apparent distress, resting comfortably  EYES: Pupils symmetric, EOMI, No conjunctival or scleral injection, non-icteric  ENMT: Oral mucosa with moist membranes  RESP: No respiratory distress, no use of accessory muscles; CTA b/l, no crackles or wheezes  CV: RRR, +S1S2, no murmurs appreciated; no peripheral edema  GI: Soft, NTND, no rebound, no guarding  MSK: No digital clubbing or cyanosis; Extremities moving equally without additional effort; gait not appreciated  : deferred  SKIN: No rashes or ulcers noted  NEURO: CN II-XII grossly intact   PSYCH: Appropriate insight/judgment; A+O x 3, mood and affect appropriate, recent/remote memory intact    No Known Allergies      acetaminophen     Tablet .. 650 milliGRAM(s) Oral every 6 hours PRN  aspirin  chewable 81 milliGRAM(s) Oral daily  atorvastatin 10 milliGRAM(s) Oral at bedtime  calcium acetate 667 milliGRAM(s) Oral three times a day with meals  carvedilol 12.5 milliGRAM(s) Oral every 12 hours  clopidogrel Tablet 75 milliGRAM(s) Oral daily  epoetin ignacia-epbx (RETACRIT) Injectable 79219 Unit(s) IV Push <User Schedule>  lisinopril 40 milliGRAM(s) Oral daily  pantoprazole    Tablet 40 milliGRAM(s) Oral before breakfast  traZODone 100 milliGRAM(s) Oral at bedtime                           SUBJECTIVE: No overnight events. Pt seen at bedside, states that they feel "fine, just waiting for the second half of my test." Pt endorses resolution of SOB. Pt denies headache, fever, chills, SOB, chest pain, weakness, dizziness, gait disturbance.      OBJECTIVE:    T(C): 36.8 (12-22-24 @ 22:55), Max: 36.8 (12-22-24 @ 22:55)  HR: 72 (12-22-24 @ 22:55) (67 - 76)  BP: 153/69 (12-22-24 @ 22:55) (148/72 - 160/77)  RR: 18 (12-22-24 @ 22:55) (18 - 18)  SpO2: 97% (12-22-24 @ 22:55) (95% - 99%)        12-22-24 @ 07:01  -  12-23-24 @ 07:00  --------------------------------------------------------  IN: 800 mL / OUT: 3100 mL / NET: -2300 mL          CONSTITUTIONAL: No apparent distress, resting comfortably  EYES: Pupils symmetric, EOMI, No conjunctival or scleral injection, non-icteric  ENMT: Oral mucosa with moist membranes  RESP: No respiratory distress, no use of accessory muscles; CTA b/l, no crackles or wheezes  CV: RRR, +S1S2, no murmurs appreciated; no peripheral edema  GI: Soft, NTND, no rebound, no guarding  MSK: No digital clubbing or cyanosis; Extremities moving equally without additional effort; gait not appreciated  : deferred  SKIN: No rashes or ulcers noted  NEURO: CN II-XII grossly intact   PSYCH: Appropriate insight/judgment; A+O x 3, mood and affect appropriate, recent/remote memory intact    No Known Allergies      acetaminophen     Tablet .. 650 milliGRAM(s) Oral every 6 hours PRN  aspirin  chewable 81 milliGRAM(s) Oral daily  atorvastatin 10 milliGRAM(s) Oral at bedtime  calcium acetate 667 milliGRAM(s) Oral three times a day with meals  carvedilol 12.5 milliGRAM(s) Oral every 12 hours  clopidogrel Tablet 75 milliGRAM(s) Oral daily  epoetin ignacia-epbx (RETACRIT) Injectable 11337 Unit(s) IV Push <User Schedule>  lisinopril 40 milliGRAM(s) Oral daily  pantoprazole    Tablet 40 milliGRAM(s) Oral before breakfast  traZODone 100 milliGRAM(s) Oral at bedtime          Refused Morning Labs    Stress test: Abnormal -- reviewed

## 2024-12-23 NOTE — DISCHARGE NOTE PROVIDER - CARE PROVIDER_API CALL
Solo Quick  Cardiology  [address] [place] [address]  Phone: [phone]  Fax: [phone]  Follow Up Time:

## 2024-12-23 NOTE — PROGRESS NOTE ADULT - PROBLEM/PLAN-9
DISPLAY PLAN FREE TEXT
Faxed to PCP. Portal message sent. Letter mailed as well.
DISPLAY PLAN FREE TEXT

## 2024-12-23 NOTE — CONSULT NOTE ADULT - ASSESSMENT
54M with PMH of HTN, HLD, ESRD on HD MWF via LUE AVF, ascites (requiring repeat paracenteses q4-6wks), NICM with moderate LV dysfunction w/ EF 35-40%(2023) and CAD s/p atherectomy + SALLIE to D1(4/11/2024) presented to [facility] ED with shortness of breath. Of note he was recently admitted from 09/27-12/02 for acute cholecystitis s/p cholecystectomy. In ED, pt was hypoxic to 86% on RA, trop 1.7K, EKG no new changes, CXR fluid overload. Admitted for AHRF 2/2 fluid overload. Pt received HD on 12/18, 12/19, 12/20 and 12/22. DAPT resumed, TTE showed improvement in LVEF to 40-45%. Stress test was abnormal with 1mm inferior STD, reversible ischemia in the inferior wall and fixed defects in the lateral, anterior and apical walls with post stress EF of 24%. Pt now transferred to [facility] for cardiac catheretization. Nephrology consulted for dialysis needs.     A/P  ESRD  Center: [facility]  Nephro: Dr. Griffiths  Access: L AVF  MWF schedule  Last hd 12/22  Consent obtained, witnessed, placed in chart  [facility] tonight  HD tomorrow   Monitor bmp     HTN  BP suboptimal  Resume home meds  UF w/ HD as tolerated  Monitor bp     Anemia   Hgb below goal  CRISTIANE w/ HD  Transfuse if hgb <8  Monitor hgb     CKD-MBD  Check pth   PO4 and Ca at goal  Monitor PO4, Ca daily     Abnormal stress test  [facility] [employer]  Cardiology follow up

## 2024-12-23 NOTE — ASU PREOP CHECKLIST - HEIGHT IN INCHES
Physical Therapy    Visit Type: treatment  Co-treat with: PT studentAjay.  Treatment diagnosis: Pain, decreased activity tolerance  SUBJECTIVE  Patient agreed to participate in therapy this date.  RN in agreement to work with patient for therapy session.  Patient feels motivated to get her strength back.  Patient / Family Goal: return to previous functional status and return home    Pain     Location: R LE    At onset of session (out of 10): 0     OBJECTIVE     Cognitive Status   Orientation    - Oriented to: person, place, time and situation  Functional Communication   - Overall Status: within functional limits  Attention Span    - Attention: intact  Following Direction   - follows all commands and directions consistently    Patient Activity Tolerance: 1 to 1 activity to rest      Range of Motion (ROM)   (degrees unless noted; active unless noted; norms in ( ); negative=lacking to 0, positive=beyond 0)  WFL: LUE, RUE  WFL: LLE, RLE    Strength  (out of 5 unless noted, standard test position unless noted)   WFL: LUE, RUE  WFL: LLE, RLE, except as noted (R LE affected by weakness and chronic sciatica)      Sitting Balance  (PARISH = base of support)  Static      - Trial 1 details: with back unsupported and modified independent  Dynamic      - Trial 1 details: supervision and with back unsupported    Standing Balance  (PARISH = base of support)  Firm Surface: Double Leg      - Static, Eyes Open       - Trial 1 details: with double UE support and contact guard     - Dynamic, Eyes Open       - Trial 1 details: with double UE support and contact guard  2 ww utilized for standing balance.       Bed Mobility  Bed mobility not performed as patient started and ended session sitting EOB.  Transfers  Assistive devices: gait belt, 2-wheeled walker  - Sit to stand: contact guard/touching/steadying assist, with tactile cues, with verbal cues  - Stand to sit: contact guard/touching/steadying assist, with verbal cues, with tactile  cues    Ambulation / Gait  - Assistive device: gait belt and two-wheeled walker  - Distance (feet unless otherwise indicated): 60, 30  - Assist Level: contact guard/touching/steadying assist  - Surface: even  - Description: heavy reliance on BUE, decreased step length left, decreased step length right and decreased re/pace    Stair Ambulation  - Number of steps: 4; 3;   - Assist Level: contact guard/touching/steadying assist  - Rails: bilateral rails  - Pattern: step to  Patient was educated on proper stair negotiation sequencing, patient able to accurately recall throughout attempt.   Interventions     Training provided: activity tolerance, balance retraining, bed mobility training, use of assistive device, safety training, functional ambulation, gait training, transfer training and stair training    Skilled input: Verbal instruction/cues and tactile instruction/cues  Verbal Consent: Writer verbally educated and received verbal consent for hand placement, positioning of patient, and techniques to be performed today from patient for clothing adjustments for techniques, therapist position for techniques and hand placement and palpation for techniques as described above and how they are pertinent to the patient's plan of care.         Education:   - Present and ready to learn: patient and with patient present  Education provided during session:  - Results of above outlined education: Verbalizes understanding and Demonstrates understanding    ASSESSMENT   Patient will benefit from skilled therapy to address listed impairments and functional limitations.  Progress: progressing toward goals  Interferring components: decreased activity tolerance    Discharge needs based on today's assessment:   - Current level of function: slightly below baseline level of function   - Therapy needs at discharge: therapy 1-3 times per week (resume home PT vs. DENISE pending progress)   - Activities of daily living (ADLs) requiring  support at discharge: bed mobility, transfers, ambulation and stairs   - Impairments that require further therapy intervention: activity tolerance, balance, strength, pain, ROM and coordination    AM-PAC  - Generalized Prior Level of Function: IND/MOD I ([facility] 22-24)       Key: MOD A=moderate assistance, IND/MOD I=independent/modified independent  - Generalized Current Level of Function     - Current Mobility Score: 18       AM-PAC Scoring Key= >21 Modified Independent; 20-21 Supervision; 18-19 Minimal assist; 13-17 Moderate assist; 9-12 Max assist; <9 Total assist      PLAN (while hospitalized)  Suggestions for next session as indicated: Progress ambulation as able (wheel chair follow for seated rest breaks between ambulation bouts). Review stair negotiation and engage in car transfer.    PT Frequency: 3-5 x per week      PT/OT Mobility Equipment for Discharge: Has 2WW and cane  Agreement to plan and goals: patient agrees with goals and treatment plan        GOALS  Review Date: 2/2/2024  Long Term Goals: (to be met by time of discharge from hospital)  Sit to supine: Patient will complete sit to supine modified independent.  Status: progressing/ongoing  Supine to sit: Patient will complete supine to sit modified independent.  Status: progressing/ongoing  Sit to stand: Patient will complete sit to stand transfer with least restrictive device, modified independent.   Status: progressing/ongoing  Ambulation (even): Patient will ambulate on even surface for 300 feet with least restrictive device, modified independent.   Status: progressing/ongoing  Stairs: Patient will ambulate 4 steps with least restrictive device modified independent.   Status: not met  Home program: patient independent with home program as instructed.   Status: not met  Documented in the chart in the following areas: Assessment/Plan.    Admitting diagnosis: Chronic anticoagulation [Z79.01];Weakness of right leg [R29.898];History of pulmonary embolism  [Z86.711];Intractable back pain [M54.9];Leukocytosis, unspecified type [D72.829]     Co-morbidities and problem list:   Patient Active Problem List:     Arthritis of knee, degenerative     Leg length discrepancy - R post epiphysiodesis     BMI 50.0-59.9, adult (CMD)     Arthropathy of shoulder region     Irregular menstrual cycle     Myofascial pain     Chronic right-sided low back pain with right-sided sciatica     Right lumbar radiculopathy     History of marijuana use     History of total right knee replacement     Pain- Interventional Pain Management     Odontogenic infection of jaw     Edema     Sciatica of right side     Intractable back pain     Left leg pain     Right-sided low back pain with right-sided sciatica, unspecified chronicity     Radiculopathy      Treatment Diagnosis: Pain, decreased activity tolerance    The referring provider's electronic signature on the evaluation authorizes the therapy plan of care and certifies the need for these services, furnished under this plan of care while under their care.      Patient at End of Session:   Location: in bed  Safety measures: call light within reach and equipment intact  Handoff to: nurse      I was in the immediate presence of the student and directed the student’s performance of the services. I am responsible for all treatment, assessment, documentation, and billing rendered for this patient.   Linda Wasserman, PT        Therapy procedure time and total treatment time can be found documented on the Time Entry flowsheet   9

## 2024-12-23 NOTE — CHART NOTE - NSCHARTNOTEFT_GEN_A_CORE
Patient s/p cardiac cath, seen and examined at bedside, appears comfortable NAD noted, offers no complaints. Rt wrist appears clean, dry, intact, no evidence of active bleeding, no hematoma, + pulses, good capillary refill. Continue to monitor.

## 2024-12-23 NOTE — DISCHARGE NOTE PROVIDER - NSDCMRMEDTOKEN_GEN_ALL_CORE_FT
aspirin 81 mg oral delayed release tablet: 1 tab(s) orally once a day  calcium acetate 667 mg oral tablet: 1 tab(s) orally 3 times a day  carvedilol 12.5 mg oral tablet: 1 tab(s) orally every 12 hours  clopidogrel 75 mg oral tablet: 1 tab(s) orally once a day  furosemide 20 mg oral tablet: 1 tab(s) orally once a day  hydrALAZINE 25 mg oral tablet: 1 tab(s) orally 3 times a day  lisinopril 40 mg oral tablet: 1 tab(s) orally once a day  lovastatin 10 mg oral tablet: 1 tab(s) orally once a day  NIFEdipine 90 mg oral tablet, extended release: 1 tab(s) orally once a day  Emani-Lisette oral tablet: 1 tab(s) orally once a day  traZODone 100 mg oral tablet: 1 tab(s) orally once a day (at bedtime)

## 2024-12-23 NOTE — ASU PATIENT PROFILE, ADULT - FALL HARM RISK - UNIVERSAL INTERVENTIONS
Bed in lowest position, wheels locked, appropriate side rails in place/Call bell, personal items and telephone in reach/Instruct patient to call for assistance before getting out of bed or chair/Non-slip footwear when patient is out of bed/[place] to call system/Physically safe environment - no spills, clutter or unnecessary equipment/Purposeful Proactive Rounding/Room/bathroom lighting operational, light cord in reach

## 2024-12-23 NOTE — PROGRESS NOTE ADULT - PROBLEM SELECTOR PLAN 2
p/w SOB and hypoxia to 86% on RA  Flu, COVID, RSV negative  CXR fluid overload  Last HD on Wednesday  s/p bumex X1 in ED  Last echo in HIE from 2023: LVEF 35-40%, Moderate global left ventricular systolic dysfunction. Grade III-IV diastolic dysfunction (severe).  proBNP >[id number]  Cardio, Dr Quick consulted  Nephrology Dr Griffiths consulted    -pt makes minimal urine; holding off on diuretics for now  -Wean off oxygen as tolerated

## 2024-12-23 NOTE — ACUTE INTERFACILITY TRANSFER NOTE - SECONDARY DIAGNOSIS.
Elevated troponin HTN (hypertension) CAD S/P percutaneous coronary angioplasty HLD (hyperlipidemia) ESRD on hemodialysis

## 2024-12-23 NOTE — PROGRESS NOTE ADULT - PROVIDER SPECIALTY LIST ADULT
Internal Medicine
Nephrology
Cardiology
Nephrology
Nephrology
Cardiology
Internal Medicine
Cardiology
Internal Medicine

## 2024-12-23 NOTE — CONSULT NOTE ADULT - SUBJECTIVE AND OBJECTIVE BOX
[facility]   MD DREW VIZCARRA MD MARIA SANTIAGO, NP        TEL:  OFFICE: [phone]  From 5pm-7am answering service [phone]    --- INITIAL RENAL CONSULT NOTE ---date of service 12-23-24 @ 18:13    HPI:  54M with PMH of HTN, HLD, ESRD on HD MWF via LUE AVF, ascites (requiring repeat paracenteses q4-6wks), NICM with moderate LV dysfunction w/ EF 35-40%(2023) and CAD s/p atherectomy + SALLIE to D1(4/11/2024) presented to [facility] ED with shortness of breath. Of note he was recently admitted from 09/27-12/02 for acute cholecystitis s/p cholecystectomy. In ED, pt was hypoxic to 86% on RA, trop 1.7K, EKG no new changes, CXR fluid overload. Admitted for AHRF 2/2 fluid overload. Pt received HD on 12/18, 12/19, 12/20 and 12/22. DAPT resumed, TTE showed improvement in LVEF to 40-45%. Stress test was abnormal with 1mm inferior STD, reversible ischemia in the inferior wall and fixed defects in the lateral, anterior and apical walls with post stress EF of 24%. Pt now transferred to [facility] for cardiac catheretization. Nephrology consulted for dialysis needs.         Allergies:  No Known Allergies      PAST MEDICAL & SURGICAL HISTORY:  Type 2 diabetes mellitus      Hypertension      End stage renal disease  started HD 2/2019 T, Th, Sat via right chest permacath      Bone spur  right shoulder- hx of - sx done      Anemia      Injury of right wrist  hx of at age 14      History of hyperkalemia  before HD- K-6.2 - to repeat in am of sx, pt had HD today      S/P arthroscopy of right shoulder  2010      History of vascular access device  right chest permacath - 2/2019      H/O right wrist surgery  tendons repair - at age 14      AV fistula      H/O ventral hernia repair      S/P cholecystectomy          Home Medications Reviewed    Hospital Medications:   MEDICATIONS  (STANDING):  sodium chloride 0.9% lock flush 3 milliLiter(s) IV Push every 8 hours      SOCIAL HISTORY:  Denies ETOh, Smoking    FAMILY HISTORY:  FH: hypertension  mother, father- alive    FH: type 2 diabetes  mother, father- alive        REVIEW OF SYSTEMS:  CONSTITUTIONAL: No weakness, fevers or chills  EYES/ENT: No visual changes;  No vertigo or throat pain   NECK: No pain or stiffness  RESPIRATORY: as per hpi   CARDIOVASCULAR: as per hpi   GASTROINTESTINAL: No abdominal or epigastric pain. No nausea, vomiting, or hematemesis; No diarrhea or constipation. No melena or hematochezia.  GENITOURINARY: No dysuria, frequency, foamy urine, urinary urgency, incontinence or hematuria  NEUROLOGICAL: No numbness or weakness  SKIN: No itching, burning, rashes, or lesions   VASCULAR: No bilateral lower extremity edema.   All other review of systems is negative unless indicated above.    VITALS:  T(F): 98.2 (12-23-24 @ 16:26), Max: 98.2 (12-22-24 @ 22:55)  HR: 72 (12-23-24 @ 16:26)  BP: 155/75 (12-23-24 @ 16:26)  RR: 14 (12-23-24 @ 16:26)  SpO2: 94% (12-23-24 @ 16:26)  Wt(kg): --    Height (cm): 175.3 (12-23 @ 16:26)  Weight (kg): 88.5 (12-23 @ 16:26)  BMI (kg/m2): 28.8 (12-23 @ 16:26)  BSA (m2): 2.04 (12-23 @ 16:26)    PHYSICAL EXAM:  General: NAD  HEENT: anicteric sclera, oropharynx clear, MMM  Neck: No JVD  Respiratory: CTAB, no wheezes, rales or rhonchi  Cardiovascular: S1, S2, RRR  Gastrointestinal: BS+, soft, NT/ND  Extremities: No cyanosis or clubbing. No peripheral edema  Neurological: A/O x 3, no focal deficits  Psychiatric: Normal mood, normal affect  : No CVA tenderness. No blunt.   Skin: No rashes  Vascular Access: L AVF    LABS:  12-22    137  |  102  |  64[H]  ----------------------------<  131[H]  5.0   |  28  |  7.48[H]    Ca    8.8      22 Dec 2024 08:59  Phos  4.0     12-22  Mg     2.1     12-22      Creatinine Trend: 7.48 <--, 6.44 <--, 4.77 <--, 3.89 <--                        8.3    7.56  )-----------( 210      ( 22 Dec 2024 08:59 )             25.0     Urine Studies:  Urinalysis Basic - ( 22 Dec 2024 08:59 )    Color:  / Appearance:  / SG:  / pH:   Gluc: 131 mg/dL / Ketone:   / Bili:  / Urobili:    Blood:  / Protein:  / Nitrite:    Leuk Esterase:  / RBC:  / WBC    Sq Epi:  / Non Sq Epi:  / Bacteria:           RADIOLOGY & ADDITIONAL STUDIES:

## 2024-12-23 NOTE — PROGRESS NOTE ADULT - PROBLEM SELECTOR PLAN 1
p/w trop 1.7k>2K>2.8K  EKG: left ant fascicular block, no new changes  pt c/o central chest pain; reproducible  Cardio, Dr Quick consulted  TTE:  G2DD, EF 40-45%, global LV hypokinesis, mod PHTN, TAPSE 1.3, mod TR, R pleural effusion  Pt unable to complete stress test 12/20 iso tachypnea, distress. ST rescheduled for monday.    -c/w Heparin drip  -f/u stress test  -c/w DAPT p/w trop 1.7k>2K>2.8K  EKG: left ant fascicular block, no new changes  pt c/o central chest pain; reproducible  Cardio, Dr Quick consulted  TTE:  G2DD, EF 40-45%, global LV hypokinesis, mod PHTN, TAPSE 1.3, mod TR, R pleural effusion  Pt unable to complete stress test 12/20 iso tachypnea, distress. ST rescheduled for monday.  stress test - abnormal  -c/w Heparin drip  -transfer to [facility] for cath  -c/w DAPT

## 2024-12-23 NOTE — ACUTE INTERFACILITY TRANSFER NOTE - PLAN OF CARE
Pt with h/o NICM with LV dysfunction. Prior TTE showed 35-40 % EF, moderate LA dilation, G3-4DD, moderate global LV systolic dysfunction. TTE during this admission showed EF 40-45%, TAPSE 1.3cm, mod TR. Stress test was abnormal with electrocardiogram ischemic changes, qualitative perfusion defects, EF 24 %, severe LV dysfunction, mod LV enlargement. Transfer to [facility] for cardiac cath. Pt with h/o NICM with LV dysfunction HFrEF, ESRD on HD, CAD s/p PCI, recurrent ascites, p/w SOB and chest pain. Prior TTE showed 35-40 % EF, moderate LA dilation, G3-4DD, moderate global LV systolic dysfunction. TTE during this admission showed EF 40-45%, TAPSE 1.3cm, mod TR. Stress test was abnormal with electrocardiogram ischemic changes, qualitative perfusion defects, EF 24 %, severe LV dysfunction, mod LV enlargement. Transfer to [facility] for cardiac cath.

## 2024-12-23 NOTE — PROGRESS NOTE ADULT - PROBLEM SELECTOR PLAN 9
DVT prop: heparin drip

## 2024-12-23 NOTE — PROGRESS NOTE ADULT - SUBJECTIVE AND OBJECTIVE BOX
NEPHROLOGY MEDICAL CARE, [facility] - Dr. Domenic Griffiths/ Dr. Bishnu Amador/ Dr. Fili Smiley/ Dr. Naomie Crowder    Date of Service: 12-23-24    Patient was seen and examined at bedside.    CC: patient is okay and no sob present.     Vital Signs Last 24 Hrs  T(C): 36.6 (23 Dec 2024 11:10), Max: 36.8 (22 Dec 2024 22:55)  T(F): 97.9 (23 Dec 2024 11:10), Max: 98.2 (22 Dec 2024 22:55)  HR: 64 (23 Dec 2024 11:10) (64 - 72)  BP: 152/72 (23 Dec 2024 11:10) (148/72 - 153/69)  BP(mean): --  RR: 18 (23 Dec 2024 11:10) (18 - 18)  SpO2: 98% (23 Dec 2024 11:10) (95% - 98%)    Parameters below as of 23 Dec 2024 11:10  Patient On (Oxygen Delivery Method): room air        12-22 @ 07:01  -  12-23 @ 07:00  --------------------------------------------------------  IN: 800 mL / OUT: 3100 mL / NET: -2300 mL        PHYSICAL EXAM:  General: No acute respiratory distress.  Eyes: conjunctiva and sclera clear  ENMT: Atraumatic, Normocephalic, supple, No JVD present. Moist mucous membranes  Respiratory:   Bilaterally rales; no rhonchi, wheezing  Cardiovascular: S1S2+; no m/r/g  Gastrointestinal: Soft, no tenderness, Nondistended; Bowel sounds present  Neuro:  Awake, Alert & Oriented X3,   Ext:  1+ pedal edema, No Cyanosis  Skin: No rashes  Dialysis Access::   Left upper arm AVF thrill/bruit+     MEDICATIONS:  MEDICATIONS  (STANDING):  aspirin  chewable 81 milliGRAM(s) Oral daily  atorvastatin 10 milliGRAM(s) Oral at bedtime  calcium acetate 667 milliGRAM(s) Oral three times a day with meals  carvedilol 12.5 milliGRAM(s) Oral every 12 hours  clopidogrel Tablet 75 milliGRAM(s) Oral daily  epoetin ignacia-epbx (RETACRIT) Injectable 61218 Unit(s) IV Push <User Schedule>  lisinopril 40 milliGRAM(s) Oral daily  pantoprazole    Tablet 40 milliGRAM(s) Oral before breakfast  traZODone 100 milliGRAM(s) Oral at bedtime    MEDICATIONS  (PRN):  acetaminophen     Tablet .. 650 milliGRAM(s) Oral every 6 hours PRN Temp greater or equal to 38C (100.4F), Mild Pain (1 - 3)          LABS:                        8.3    7.56  )-----------( 210      ( 22 Dec 2024 08:59 )             25.0     12-22    137  |  102  |  64[H]  ----------------------------<  131[H]  5.0   |  28  |  7.48[H]    Ca    8.8      22 Dec 2024 08:59  Phos  4.0     12-22  Mg     2.1     12-22        Urinalysis Basic - ( 22 Dec 2024 08:59 )    Color: x / Appearance: x / SG: x / pH: x  Gluc: 131 mg/dL / Ketone: x  / Bili: x / Urobili: x   Blood: x / Protein: x / Nitrite: x   Leuk Esterase: x / RBC: x / WBC x   Sq Epi: x / Non Sq Epi: x / Bacteria: x        Urine studies    PTH and Vit D:

## 2024-12-23 NOTE — PATIENT PROFILE ADULT - FALL HARM RISK - HARM RISK INTERVENTIONS

## 2024-12-23 NOTE — H&P CARDIOLOGY - HISTORY OF PRESENT ILLNESS
54M with PMH of HTN, HLD, ESRD on HD MWF via LUE AVF, ascites (requiring repeat paracenteses q4-6wks), NICM with moderate LV dysfunction w/ EF 35-40%(2023) and CAD s/p atherectomy + SALLIE to D1(4/11/2024) preseneted to [facility] ED with shortness of breath. Of note he was recently admitted from 09/27-12/02 for acute cholecystitis s/p cholecystectomy. In ED, pt was hypoxic to 86% on RA, trop 1.7K, EKG no new changes, CXR fluid overload. Admitted for AHRF 2/2 fluid overload. Pt recieved HD on 12/18, 12/19, 12/20 and 12/22. DAPT resumed, TTE showed improvement in LVEF to 40-45%. Stress test was abnormal with 1mm inferior STD, reversible ischemia in the inferior wall and fixed defects in the lateral, anterior and apical walls with post stress EF of 24%. Pt now transferred to [facility] for cardiac catheretization. 54M with PMH of HTN, HLD, ESRD on HD MWF via LUE AVF, ascites (requiring repeat paracenteses q4-6wks), NICM with moderate LV dysfunction w/ EF 35-40%(2023) and CAD s/p atherectomy + SALLIE to D1(4/11/2024) presented to [facility] ED with shortness of breath. Of note he was recently admitted from 09/27-12/02 for acute cholecystitis s/p cholecystectomy. In ED, pt was hypoxic to 86% on RA, trop 1.7K, EKG no new changes, CXR fluid overload. Admitted for AHRF 2/2 fluid overload. Pt received HD on 12/18, 12/19, 12/20 and 12/22. DAPT resumed, TTE showed improvement in LVEF to 40-45%. Stress test was abnormal with 1mm inferior STD, reversible ischemia in the inferior wall and fixed defects in the lateral, anterior and apical walls with post stress EF of 24%. Pt now transferred to [facility] for cardiac catheretization.

## 2024-12-23 NOTE — ACUTE INTERFACILITY TRANSFER NOTE - HOSPITAL COURSE
The patient is a 54-year-old male with a significant medical history, including hypertension, hyperlipidemia, end-stage renal disease on hemodialysis (scheduled for Monday, Wednesday, and Friday via a left upper extremity arteriovenous fistula), ascites requiring repeat paracenteses every 4 to 6 weeks, non-ischemic cardiomyopathy with moderate left ventricular dysfunction, and coronary artery disease. He experienced a non-ST elevation myocardial infarction on April 7, 2024, and subsequently underwent an atherectomy, intravascular lithotripsy, and drug-eluting stent placement in the LAD branch D1 on April 11, 2024.    Recently, the patient was admitted from September 27 to December 2 for acute cholecystitis, during which he underwent a cholecystectomy. He presented again with shortness of breath lasting one day prior to admission. In the emergency department, he was noted to be hypoxic with an oxygen saturation of 86% on room air. Laboratory tests revealed an elevated troponin level of 1.7K, although his ECG did not show any new changes. A chest X-ray indicated fluid overload. Stress Test showed: Abnormal Myocardial Perfusion. Test results:  -Baseline electrocardiogram: normal sinus rhythm at a rate of 66 bpm with q anterior leads,T wave inversion lateral leads.  -Electrocardiogram ischemic changes: 1 mm horizontal st-t depression in inferior leads which started 1 minute into stress test and persisted 4 minutes into recovery.  -Qualitative Perfusion:      - medium-sized, severe defect(s) in the inferior wall that is reversible suggestive of ischemia. - medium-sized, severe defect(s) in the lateral wall that is fixed suggestive of an infarction. - medium-sized, severe defect(s) in the anterior and apical wall that is fixed suggestive of an infarction.  -The left ventricle is severely reduced in function and moderately enlarged in size. The post stress left ventricular EF is 24 %. The stress end diastolic volume is 213 ml and systolic volume is 162 ml.  -The inferior, anterior and lateral walls are hypokinetic.    Based on these findings, he was admitted for acute hypoxic respiratory failure secondary to fluid overload. During his hospital stay, his troponin levels babatunde to 2.8K, and he reported central chest pain that was reproducible on examination. Despite an ECG showing left anterior fascicular block, no new changes were noted. He was managed with a heparin drip and continued on dual antiplatelet therapy. Plans were made to follow up with a stress test and transthoracic echocardiogram to further evaluate his cardiac status.  Regarding his respiratory failure, tests for flu, COVID-19, and RSV were negative, and nephrology and cardiology were consulted due to the suspected fluid overload. Diuretics were held due to minimal urine output, and he resumed hemodialysis, with a plan to wean off supplemental oxygen as his condition allowed.  The patient also experienced a CHF exacerbation, which was managed through fluid removal during hemodialysis sessions. His anemia, with baseline hemoglobin levels between 10 to 11 g/dL, was noted to be stable following his cholecystectomy, with current levels at 8.4 g/dL. This anemia was attributed to his ESRD, and erythropoietin was continued during dialysis.  For his ESRD, he remained on his regular hemodialysis schedule, with adjustments made to his medications to accommodate his renal function. He adhered to a renal diet with a fluid restriction of 1 liter per day, and his electrolytes and weight were monitored daily.  In managing his hypertension, nifedipine was temporarily held due to his low-normal blood pressure, while carvedilol and lisinopril were continued with adjustments as necessary. His mineral bone disease was treated with calcium acetate, and phosphate levels were monitored regularly.  Throughout his stay, he continued on medications including carvedilol, lisinopril, Plavix, aspirin, and a statin. He also received a heparin drip for anticoagulation.

## 2024-12-23 NOTE — PROGRESS NOTE ADULT - NUTRITIONAL ASSESSMENT
Diet, Regular:   Consistent Carbohydrate {Evening Snacks}  DASH/TLC {Sodium & Cholesterol Restricted}  For patients receiving Renal Replacement - No Protein Restr, No Conc K, No Conc Phos, Low Sodium (RENAL) (12-19-24 @ 03:11) [Active]

## 2024-12-23 NOTE — PROGRESS NOTE ADULT - NS ATTEND AMEND GEN_ALL_CORE FT
54M CAD NSTEMI s/p PCI 4/11/24, ESRD HD MWF, HFrEF, recurrent ascites, s/p cholecystectomy p/w cc SOB and reproducible CP, found with troponemia and BNP elevation, admission for NSTEMI/ACS r/o, fluid overload.    AP:  AHRF  NSTEMI/ACS r/o  Fluid overload vs HFrEF exacerbation  ESRD HD MWF  recurrent ascites  s/p cholecystectomy  HTN    -CXR 12/20 c/w fluid overload/HF exacerbation  -discussed with cards Dr Quick: covering for ACS     -s/p hep gtt    -c/w DAPT, statin, prn nitroglycerin    -obtained TTE     -pending stress test Monday  -ekg with lateral twi and poor R wave progression but unchanged from previous  -HD per nephro for fluid optimization     -EPO  -c/w PPI   -BP well controlled, continue home meds    =================================  I independently reviewed the following tests: N/A  I discussed management with specialists and the plan of care is described above.  I ordered labs and studies: CBC, BMP  I reviewed the following labs to guide my management:                        8.3    7.56  )-----------( 210      ( 22 Dec 2024 08:59 )             25.0     12-22    137  |  102  |  64[H]  ----------------------------<  131[H]  5.0   |  28  |  7.48[H]    Ca    8.8      22 Dec 2024 08:59  Phos  4.0     12-22  Mg     2.1     12-22 54M CAD NSTEMI s/p PCI 4/11/24, ESRD HD MWF, HFrEF, recurrent ascites, s/p cholecystectomy p/w cc SOB and reproducible CP, found with troponemia and BNP elevation, admission for NSTEMI/ACS r/o, fluid overload.    AP:  AHRF  NSTEMI/ACS r/o  Fluid overload vs HFrEF exacerbation  ESRD HD MWF  recurrent ascites  s/p cholecystectomy  HTN    -CXR 12/20 c/w fluid overload/HF exacerbation  -discussed with cards Dr Quick: covering for ACS     -s/p hep gtt    -c/w DAPT, statin, prn nitroglycerin    -obtained TTE     -stress test is positive; pending transfer to [facility] for [facility]  -ekg with lateral twi and poor R wave progression but unchanged from previous  -HD per nephro for fluid optimization     -EPO  -c/w PPI   -BP well controlled, continue home meds    =================================  I independently reviewed the following tests: N/A  I discussed management with specialists and the plan of care is described above.  I ordered labs and studies: CBC, BMP  I reviewed the following labs to guide my management:                        8.3    7.56  )-----------( 210      ( 22 Dec 2024 08:59 )             25.0     12-22    137  |  102  |  64[H]  ----------------------------<  131[H]  5.0   |  28  |  7.48[H]    Ca    8.8      22 Dec 2024 08:59  Phos  4.0     12-22  Mg     2.1     12-22

## 2024-12-23 NOTE — DISCHARGE NOTE PROVIDER - NSDCCPCAREPLAN_GEN_ALL_CORE_FT
PRINCIPAL DISCHARGE DIAGNOSIS  Diagnosis: CAD (coronary artery disease)  Assessment and Plan of Treatment:       SECONDARY DISCHARGE DIAGNOSES  Diagnosis: ESRD on dialysis  Assessment and Plan of Treatment:

## 2024-12-23 NOTE — PATIENT PROFILE ADULT - NSPROGENBLOODRESTRICT_GEN_A_NUR
2nd request Team Randy  Please advise coordinator when 4.5.22 OP note is complete for faxing to [facility]     none

## 2024-12-23 NOTE — PATIENT PROFILE ADULT - FUNCTIONAL ASSESSMENT - BASIC MOBILITY 4.
DadRyan picked up form from physical that was left for patient behind the .   4 = No assist / stand by assistance

## 2024-12-23 NOTE — PROGRESS NOTE ADULT - REASON FOR ADMISSION
AHRF 2/2 volume overload

## 2024-12-23 NOTE — PATIENT PROFILE ADULT - VISION (WITH CORRECTIVE LENSES IF THE PATIENT USUALLY WEARS THEM):
Patient given Rx for glasses. Normal vision: sees adequately in most situations; can see medication labels, newsprint

## 2024-12-23 NOTE — DISCHARGE NOTE PROVIDER - HOSPITAL COURSE
54M with PMH of HTN, HLD, ESRD on HD MWF via LUE AVF, ascites (requiring repeat paracenteses q4-6wks), NICM with moderate LV dysfunction w/ EF 35-40%(2023) and CAD s/p atherectomy + SALLIE to D1(4/11/2024) [facility] with shortness of breath. Of note he was recently admitted from 09/27-12/02 for acute cholecystitis s/p cholecystectomy. In ED, pt was hypoxic to 86% on RA, trop 1.7K, EKG no new changes, CXR fluid overload. Admitted for AHRF 2/2 fluid overload. Pt received HD on 12/18, 12/19, 12/20 and 12/22. DAPT resumed, TTE showed improvement in LVEF to 40-45%. Stress test was abnormal with 1mm inferior STD, reversible ischemia in the inferior wall and fixed defects in the lateral, anterior and apical walls with post stress EF of 24%. Pt now transferred to [facility] for cardiac catheretization.  12/23 [facility]: pLAD 70% ISR, mLCx 70%, D1 severe dz, dRCA 40%, LVEDP 28, RRA accessed- site stable, pt to be started on hep qtt and transfer to [facility] for poss CABG eval.

## 2024-12-23 NOTE — PROGRESS NOTE ADULT - PROBLEM SELECTOR PLAN 5
ESRD on HD MWF  HD on wednesday; removed 1.5L fluid  CXR fluid overload  makes minimal urine  Nephrology Dr Griffiths consulted    -c/w calcium acetate

## 2024-12-24 ENCOUNTER — TRANSCRIPTION ENCOUNTER (OUTPATIENT)
Age: 54
End: 2024-12-24

## 2024-12-24 ENCOUNTER — INPATIENT (INPATIENT)
Facility: HOSPITAL | Age: 54
LOS: 84 days | Discharge: HOME HEALTH | DRG: 303 | End: 2025-03-19
Attending: THORACIC SURGERY (CARDIOTHORACIC VASCULAR SURGERY) | Admitting: THORACIC SURGERY (CARDIOTHORACIC VASCULAR SURGERY)
Payer: MEDICARE

## 2024-12-24 ENCOUNTER — RESULT REVIEW (OUTPATIENT)
Age: 54
End: 2024-12-24

## 2024-12-24 VITALS
OXYGEN SATURATION: 96 % | RESPIRATION RATE: 18 BRPM | TEMPERATURE: 99 F | SYSTOLIC BLOOD PRESSURE: 149 MMHG | HEART RATE: 75 BPM | DIASTOLIC BLOOD PRESSURE: 76 MMHG

## 2024-12-24 VITALS
HEIGHT: 71 IN | OXYGEN SATURATION: 92 % | HEART RATE: 74 BPM | DIASTOLIC BLOOD PRESSURE: 86 MMHG | SYSTOLIC BLOOD PRESSURE: 166 MMHG | WEIGHT: 184.31 LBS | TEMPERATURE: 99 F | RESPIRATION RATE: 18 BRPM

## 2024-12-24 DIAGNOSIS — Z98.890 OTHER SPECIFIED POSTPROCEDURAL STATES: Chronic | ICD-10-CM

## 2024-12-24 DIAGNOSIS — E11.9 TYPE 2 DIABETES MELLITUS WITHOUT COMPLICATIONS: ICD-10-CM

## 2024-12-24 DIAGNOSIS — I77.0 ARTERIOVENOUS FISTULA, ACQUIRED: Chronic | ICD-10-CM

## 2024-12-24 DIAGNOSIS — I25.10 ATHEROSCLEROTIC HEART DISEASE OF NATIVE CORONARY ARTERY WITHOUT ANGINA PECTORIS: ICD-10-CM

## 2024-12-24 DIAGNOSIS — Z90.49 ACQUIRED ABSENCE OF OTHER SPECIFIED PARTS OF DIGESTIVE TRACT: Chronic | ICD-10-CM

## 2024-12-24 DIAGNOSIS — N18.6 END STAGE RENAL DISEASE: ICD-10-CM

## 2024-12-24 LAB
A1C WITH ESTIMATED AVERAGE GLUCOSE RESULT: 5.6 % — SIGNIFICANT CHANGE UP (ref 4–5.6)
ALBUMIN SERPL ELPH-MCNC: 3.4 G/DL — SIGNIFICANT CHANGE UP (ref 3.3–5)
ALP SERPL-CCNC: 114 U/L — SIGNIFICANT CHANGE UP (ref 40–120)
ALT FLD-CCNC: 9 U/L — LOW (ref 10–45)
ANION GAP SERPL CALC-SCNC: 15 MMOL/L — SIGNIFICANT CHANGE UP (ref 5–17)
APPEARANCE UR: CLEAR — SIGNIFICANT CHANGE UP
APTT BLD: 34.4 SEC — SIGNIFICANT CHANGE UP (ref 24.5–35.6)
APTT BLD: 39.7 SEC — HIGH (ref 24.5–35.6)
AST SERPL-CCNC: 15 U/L — SIGNIFICANT CHANGE UP (ref 10–40)
BILIRUB SERPL-MCNC: 0.3 MG/DL — SIGNIFICANT CHANGE UP (ref 0.2–1.2)
BILIRUB UR-MCNC: NEGATIVE — SIGNIFICANT CHANGE UP
BLD GP AB SCN SERPL QL: NEGATIVE — SIGNIFICANT CHANGE UP
BUN SERPL-MCNC: 71 MG/DL — HIGH (ref 7–23)
CALCIUM SERPL-MCNC: 8.9 MG/DL — SIGNIFICANT CHANGE UP (ref 8.4–10.5)
CAST: 0 /LPF — SIGNIFICANT CHANGE UP (ref 0–4)
CHLORIDE SERPL-SCNC: 98 MMOL/L — SIGNIFICANT CHANGE UP (ref 96–108)
CO2 SERPL-SCNC: 23 MMOL/L — SIGNIFICANT CHANGE UP (ref 22–31)
CREAT SERPL-MCNC: 7.65 MG/DL — HIGH (ref 0.5–1.3)
DIFF PNL FLD: NEGATIVE — SIGNIFICANT CHANGE UP
EGFR: 8 ML/MIN/1.73M2 — LOW
EGFR: 8 ML/MIN/1.73M2 — LOW
EOSINOPHIL # BLD AUTO: 0.85 K/UL — HIGH (ref 0–0.5)
EOSINOPHIL NFR BLD AUTO: 12.5 % — HIGH (ref 0–6)
ESTIMATED AVERAGE GLUCOSE: 114 MG/DL — SIGNIFICANT CHANGE UP (ref 68–114)
GLUCOSE SERPL-MCNC: 116 MG/DL — HIGH (ref 70–99)
GLUCOSE UR QL: 100 MG/DL
HCT VFR BLD CALC: 25.7 % — LOW (ref 39–50)
HGB BLD-MCNC: 8 G/DL — LOW (ref 13–17)
IMM GRANULOCYTES NFR BLD AUTO: 0.4 % — SIGNIFICANT CHANGE UP (ref 0–0.9)
INR BLD: 1.19 RATIO — HIGH (ref 0.85–1.16)
KETONES UR-MCNC: NEGATIVE MG/DL — SIGNIFICANT CHANGE UP
LEUKOCYTE ESTERASE UR-ACNC: NEGATIVE — SIGNIFICANT CHANGE UP
LYMPHOCYTES # BLD AUTO: 0.64 K/UL — LOW (ref 1–3.3)
LYMPHOCYTES # BLD AUTO: 9.4 % — LOW (ref 13–44)
MCHC RBC-ENTMCNC: 31.1 G/DL — LOW (ref 32–36)
MCHC RBC-ENTMCNC: 31.7 PG — SIGNIFICANT CHANGE UP (ref 27–34)
MCV RBC AUTO: 102 FL — HIGH (ref 80–100)
MONOCYTES # BLD AUTO: 0.46 K/UL — SIGNIFICANT CHANGE UP (ref 0–0.9)
MONOCYTES NFR BLD AUTO: 6.8 % — SIGNIFICANT CHANGE UP (ref 2–14)
MRSA PCR RESULT.: SIGNIFICANT CHANGE UP
NEUTROPHILS # BLD AUTO: 4.76 K/UL — SIGNIFICANT CHANGE UP (ref 1.8–7.4)
NEUTROPHILS NFR BLD AUTO: 70.2 % — SIGNIFICANT CHANGE UP (ref 43–77)
NITRITE UR-MCNC: NEGATIVE — SIGNIFICANT CHANGE UP
NRBC # BLD: 0 /100 WBCS — SIGNIFICANT CHANGE UP (ref 0–0)
NRBC BLD-RTO: 0 /100 WBCS — SIGNIFICANT CHANGE UP (ref 0–0)
NT-PROBNP SERPL-SCNC: HIGH PG/ML (ref 0–300)
PA ADP PRP-ACNC: 321 PRU — SIGNIFICANT CHANGE UP (ref 182–335)
PH UR: 8.5 (ref 5–8)
PLATELET # BLD AUTO: 206 K/UL — SIGNIFICANT CHANGE UP (ref 150–400)
POTASSIUM SERPL-MCNC: 5.5 MMOL/L — HIGH (ref 3.5–5.3)
POTASSIUM SERPL-SCNC: 5.5 MMOL/L — HIGH (ref 3.5–5.3)
PROT SERPL-MCNC: 7.4 G/DL — SIGNIFICANT CHANGE UP (ref 6–8.3)
PROT UR-MCNC: 100 MG/DL
PROTHROM AB SERPL-ACNC: 13.7 SEC — HIGH (ref 9.9–13.4)
RBC # BLD: 2.52 M/UL — LOW (ref 4.2–5.8)
RBC # FLD: 15.6 % — HIGH (ref 10.3–14.5)
RBC CASTS # UR COMP ASSIST: 1 /HPF — SIGNIFICANT CHANGE UP (ref 0–4)
RH IG SCN BLD-IMP: POSITIVE — SIGNIFICANT CHANGE UP
S AUREUS DNA NOSE QL NAA+PROBE: DETECTED
SODIUM SERPL-SCNC: 136 MMOL/L — SIGNIFICANT CHANGE UP (ref 135–145)
SP GR SPEC: 1.01 — SIGNIFICANT CHANGE UP (ref 1–1.03)
SQUAMOUS # UR AUTO: 4 /HPF — SIGNIFICANT CHANGE UP (ref 0–5)
T4 FREE SERPL-MCNC: 1 NG/DL — SIGNIFICANT CHANGE UP (ref 0.9–1.7)
UROBILINOGEN FLD QL: 0.2 MG/DL — SIGNIFICANT CHANGE UP (ref 0.2–1)
WBC # BLD: 6.79 K/UL — SIGNIFICANT CHANGE UP (ref 3.8–10.5)
WBC # FLD AUTO: 6.79 K/UL — SIGNIFICANT CHANGE UP (ref 3.8–10.5)
WBC UR QL: 2 /HPF — SIGNIFICANT CHANGE UP (ref 0–5)

## 2024-12-24 PROCEDURE — 93356 MYOCRD STRAIN IMG SPCKL TRCK: CPT

## 2024-12-24 PROCEDURE — 71045 X-RAY EXAM CHEST 1 VIEW: CPT | Mod: 26

## 2024-12-24 PROCEDURE — 93306 TTE W/DOPPLER COMPLETE: CPT | Mod: 26

## 2024-12-24 PROCEDURE — 99222 1ST HOSP IP/OBS MODERATE 55: CPT | Mod: FS

## 2024-12-24 PROCEDURE — 93880 EXTRACRANIAL BILAT STUDY: CPT | Mod: 26

## 2024-12-24 RX ORDER — EPOETIN ALFA 10000 [IU]/ML
10000 SOLUTION INTRAVENOUS; SUBCUTANEOUS
Refills: 0 | Status: DISCONTINUED | OUTPATIENT
Start: 2024-12-24 | End: 2024-12-30

## 2024-12-24 RX ORDER — EPOETIN ALFA 10000 [IU]/ML
10000 SOLUTION INTRAVENOUS; SUBCUTANEOUS ONCE
Refills: 0 | Status: COMPLETED | OUTPATIENT
Start: 2024-12-24 | End: 2024-12-24

## 2024-12-24 RX ORDER — HEPARIN SODIUM 1000 [USP'U]/ML
1100 INJECTION INTRAVENOUS; SUBCUTANEOUS
Qty: 25000 | Refills: 0 | Status: DISCONTINUED | OUTPATIENT
Start: 2024-12-24 | End: 2024-12-24

## 2024-12-24 RX ORDER — INSULIN LISPRO 100 U/ML
INJECTION, SOLUTION INTRAVENOUS; SUBCUTANEOUS AT BEDTIME
Refills: 0 | Status: DISCONTINUED | OUTPATIENT
Start: 2024-12-24 | End: 2024-12-30

## 2024-12-24 RX ORDER — TRAZODONE HCL 100 MG
100 TABLET ORAL AT BEDTIME
Refills: 0 | Status: DISCONTINUED | OUTPATIENT
Start: 2024-12-24 | End: 2024-12-30

## 2024-12-24 RX ORDER — ENOXAPARIN SODIUM 100 MG/ML
30 INJECTION SUBCUTANEOUS EVERY 24 HOURS
Refills: 0 | Status: DISCONTINUED | OUTPATIENT
Start: 2024-12-24 | End: 2024-12-29

## 2024-12-24 RX ORDER — CARVEDILOL 3.12 MG/1
12.5 TABLET, FILM COATED ORAL EVERY 12 HOURS
Refills: 0 | Status: DISCONTINUED | OUTPATIENT
Start: 2024-12-24 | End: 2024-12-30

## 2024-12-24 RX ORDER — HEPARIN SODIUM 1000 [USP'U]/ML
1000 INJECTION INTRAVENOUS; SUBCUTANEOUS
Qty: 25000 | Refills: 0 | Status: DISCONTINUED | OUTPATIENT
Start: 2024-12-24 | End: 2024-12-24

## 2024-12-24 RX ORDER — LISINOPRIL 5 MG/1
40 TABLET ORAL DAILY
Refills: 0 | Status: DISCONTINUED | OUTPATIENT
Start: 2024-12-24 | End: 2024-12-24

## 2024-12-24 RX ORDER — ASPIRIN 325 MG
81 TABLET ORAL DAILY
Refills: 0 | Status: DISCONTINUED | OUTPATIENT
Start: 2024-12-24 | End: 2024-12-30

## 2024-12-24 RX ORDER — AMLODIPINE BESYLATE 10 MG/1
5 TABLET ORAL DAILY
Refills: 0 | Status: DISCONTINUED | OUTPATIENT
Start: 2024-12-24 | End: 2024-12-25

## 2024-12-24 RX ORDER — INSULIN LISPRO 100 U/ML
INJECTION, SOLUTION INTRAVENOUS; SUBCUTANEOUS
Refills: 0 | Status: DISCONTINUED | OUTPATIENT
Start: 2024-12-24 | End: 2024-12-30

## 2024-12-24 RX ORDER — ATORVASTATIN CALCIUM 80 MG/1
80 TABLET, FILM COATED ORAL AT BEDTIME
Refills: 0 | Status: DISCONTINUED | OUTPATIENT
Start: 2024-12-24 | End: 2024-12-30

## 2024-12-24 RX ADMIN — Medication 667 MILLIGRAM(S): at 12:01

## 2024-12-24 RX ADMIN — HEPARIN SODIUM 1000 UNIT(S)/HR: 1000 INJECTION, SOLUTION INTRAVENOUS; SUBCUTANEOUS at 01:21

## 2024-12-24 RX ADMIN — Medication 667 MILLIGRAM(S): at 18:19

## 2024-12-24 RX ADMIN — CARVEDILOL 12.5 MILLIGRAM(S): 3.12 TABLET, FILM COATED ORAL at 06:11

## 2024-12-24 RX ADMIN — Medication 3 MILLILITER(S): at 21:43

## 2024-12-24 RX ADMIN — AMLODIPINE BESYLATE 5 MILLIGRAM(S): 10 TABLET ORAL at 23:08

## 2024-12-24 RX ADMIN — ATORVASTATIN CALCIUM 80 MILLIGRAM(S): 80 TABLET, FILM COATED ORAL at 21:37

## 2024-12-24 RX ADMIN — Medication 40 MILLIGRAM(S): at 06:11

## 2024-12-24 RX ADMIN — Medication 3 MILLILITER(S): at 13:25

## 2024-12-24 RX ADMIN — CARVEDILOL 12.5 MILLIGRAM(S): 3.12 TABLET, FILM COATED ORAL at 18:19

## 2024-12-24 RX ADMIN — ENOXAPARIN SODIUM 30 MILLIGRAM(S): 100 INJECTION SUBCUTANEOUS at 12:00

## 2024-12-24 RX ADMIN — Medication 3 MILLILITER(S): at 05:11

## 2024-12-24 RX ADMIN — Medication 667 MILLIGRAM(S): at 10:45

## 2024-12-24 RX ADMIN — Medication 81 MILLIGRAM(S): at 12:01

## 2024-12-24 RX ADMIN — EPOETIN ALFA 10000 UNIT(S): 10000 SOLUTION INTRAVENOUS; SUBCUTANEOUS at 15:21

## 2024-12-24 RX ADMIN — Medication 100 MILLIGRAM(S): at 21:37

## 2024-12-24 NOTE — H&P ADULT - NSHPPHYSICALEXAM_GEN_ALL_CORE
PHYSICAL EXAM  General: NAD   HEENT:  NC/AT  Neurology: A&Ox3, NAD  CV : RRR+S1S2  Lungs: Respirations non-labored, B/L BS clear  Abdomen: Soft, mild tenderness/ND, +BSx4Q  Extremities: negative B/L LE edema, negative calf tenderness, +PP B/L   Musculoskeletal: B/L UE and LE 5/5 strength

## 2024-12-24 NOTE — CONSULT NOTE ADULT - SUBJECTIVE AND OBJECTIVE BOX
[facility]    Dr. Nica Styles     Office [phone] (9 am to 5 pm)  Service : [phone] ( 5pm to 9 am)  Also Available on Teams        RENAL INITIAL CONSULT NOTE: DATE OF SERVICE 24 @ 12:31    HPI:  54M with PMH of HTN, HLD, ESRD on HD MWF via LUE AVF, ascites (requiring repeat paracenteses q4-6wks), NICM with moderate LV dysfunction w/ EF 35-40%() and CAD s/p atherectomy + SALLIE to D1(2024) presents to [facility] transferred from [facility]. Patient initially presented to [facility] ED with shortness of breath. Of note he was recently admitted from - for acute cholecystitis s/p cholecystectomy. In [facility] ED, pt was hypoxic to 86% on RA, trop 1.7K, EKG no new changes, CXR fluid overload. Admitted for AHRF 2/2 fluid overload. Pt received HD on , ,  and . DAPT was resumed, TTE showed improvement in LVEF to 40-45%. Stress test was abnormal with 1mm inferior STD, reversible ischemia in the inferior wall and fixed defects in the lateral, anterior and apical walls with post stress EF of 24%. Pt was then transferred to [facility] for cardiac cath which showed pLAD 70% ISR, mLCx 70%, D1 severe dz. Patient now transferred to [facility] CTS Dr. Rivers for CABG eval. Patient denies any current SOB or chest pain on admission. Otherwise denies headaches, abdominal pain, urinary or bowel changes, fevers, chills, N/V/D, sick contacts.  (24 Dec 2024 05:07)    Nephrology called for ESRD on HD last HD on  as per patient     Allergies:  No Known Allergies      PAST MEDICAL & SURGICAL HISTORY:  Type 2 diabetes mellitus      Hypertension      End stage renal disease  started HD 2019 T, Th, Sat via right chest permacath      Bone spur  right shoulder- hx of - sx done      Anemia      Injury of right wrist  hx of at age 14      History of hyperkalemia  before HD- K-6.2 - to repeat in am of sx, pt had HD today      S/P arthroscopy of right shoulder  2010      History of vascular access device  right chest permacath - 2019      H/O right wrist surgery  tendons repair - at age 14      AV fistula      H/O ventral hernia repair      S/P cholecystectomy          Home Medications Reviewed    Hospital Medications:   MEDICATIONS  (STANDING):  aspirin enteric coated 81 milliGRAM(s) Oral daily  atorvastatin 80 milliGRAM(s) Oral at bedtime  calcium acetate 667 milliGRAM(s) Oral three times a day with meals  carvedilol 12.5 milliGRAM(s) Oral every 12 hours  enoxaparin Injectable 30 milliGRAM(s) SubCutaneous every 24 hours  epoetin ignacia (EPOGEN) Injectable 84868 Unit(s) IV Push <User Schedule>  insulin lispro (ADMELOG) corrective regimen sliding scale   SubCutaneous three times a day before meals  insulin lispro (ADMELOG) corrective regimen sliding scale   SubCutaneous at bedtime  pantoprazole    Tablet 40 milliGRAM(s) Oral before breakfast  sodium chloride 0.9% lock flush 3 milliLiter(s) IV Push every 8 hours  traZODone 100 milliGRAM(s) Oral at bedtime      SOCIAL HISTORY:  Denies ETOh, Smoking,     FAMILY HISTORY:  FH: hypertension  mother, father- alive    FH: type 2 diabetes  mother, father- alive        REVIEW OF SYSTEMS:  CONSTITUTIONAL: No weakness, fevers or chills  EYES/ENT: No visual changes;  No vertigo or throat pain   NECK: No pain or stiffness  RESPIRATORY: No cough, wheezing, hemoptysis; No shortness of breath  CARDIOVASCULAR: No chest pain or palpitations.  GASTROINTESTINAL: No abdominal or epigastric pain. No nausea, vomiting, or hematemesis; No diarrhea or constipation. No melena or hematochezia.  GENITOURINARY: No dysuria, frequency, foamy urine, urinary urgency, incontinence or hematuria  NEUROLOGICAL: No numbness or weakness  SKIN: No itching, burning, rashes, or lesions   VASCULAR: No bilateral lower extremity edema.   All other review of systems is negative unless indicated above.    VITALS:  T(F): 98.8 (24 @ 04:16), Max: 98.8 (24 @ 04:16)  HR: 66 (24 @ 06:07)  BP: 165/83 (24 @ 06:07)  RR: 18 (24 @ 04:16)  SpO2: 92% (24 04:16)  Wt(kg): --     07:01  -   07:00  --------------------------------------------------------  IN: 140 mL / OUT: 0 mL / NET: 140 mL     07:01  -   12:31  --------------------------------------------------------  IN: 11 mL / OUT: 0 mL / NET: 11 mL      Height (cm): 180.3 (:16), 175.3 ( 16:26)  Weight (kg): 83.6 (:16), 88.5 (:26)  BMI (kg/m2): 25.7 (:16), 28.8 ( 16:26)  BSA (m2): 2.04 (:16), 2.04 (:26)    PHYSICAL EXAM:  Constitutional: NAD  HEENT: anicteric sclera, oropharynx clear, MMM  Neck: No JVD  Respiratory: CTAB, no wheezes, rales or rhonchi  Cardiovascular: S1, S2, RRR  Gastrointestinal: BS+, soft, NT/ND  Extremities: No cyanosis or clubbing. No peripheral edema  Neurological: A/O x 3, no focal deficits  Psychiatric: Normal mood, normal affect  : No CVA tenderness. No blunt.   Skin: No rashes  Vascular Access:    LABS:      136  |  98  |  71[H]  ----------------------------<  116[H]  5.5[H]   |  23  |  7.65[H]    Ca    8.9      24 Dec 2024 06:50    TPro  7.4  /  Alb  3.4  /  TBili  0.3  /  DBili      /  AST  15  /  ALT  9[L]  /  AlkPhos  114  12-24    Creatinine Trend: 7.65 <--, 7.48 <--, 6.44 <--, 4.77 <--, 3.89 <--                        8.0    6.79  )-----------( 206      ( 24 Dec 2024 06:50 )             25.7     Urine Studies:  Urinalysis Basic - ( 24 Dec 2024 07:24 )    Color: Yellow / Appearance: Clear / S.013 / pH:   Gluc:  / Ketone: Negative mg/dL  / Bili: Negative / Urobili: 0.2 mg/dL   Blood:  / Protein: 100 mg/dL / Nitrite: Negative   Leuk Esterase: Negative / RBC: 1 /HPF / WBC 2 /HPF   Sq Epi:  / Non Sq Epi: 4 /HPF / Bacteria: Negative /HPF          RADIOLOGY & ADDITIONAL STUDIES:

## 2024-12-24 NOTE — CONSULT NOTE ADULT - SUBJECTIVE AND OBJECTIVE BOX
PATIENT SEEN AND EXAMINED BY JEAN PAUL GABRIEL M.D. ON :- 24  DATE OF SERVICE:  24           Interim events noted,Labs ,Radiological studies and Cardiology tests reviewed .    Patient is a 54y old  Male who presents with a chief complaint of     HPI:  54M with PMH of HTN, HLD, ESRD on HD MWF via LUE AVF, ascites (requiring repeat paracenteses q4-6wks), NICM with moderate LV dysfunction w/ EF 35-40%() and CAD s/p atherectomy + SALLIE to D1(2024) presents to [facility] transferred from [facility]. Patient initially presented to [facility] ED with shortness of breath. Of note he was recently admitted from - for acute cholecystitis s/p cholecystectomy. In [facility] ED, pt was hypoxic to 86% on RA, trop 1.7K, EKG no new changes, CXR fluid overload. Admitted for AHRF 2/2 fluid overload. Pt received HD on , ,  and . DAPT was resumed, TTE showed improvement in LVEF to 40-45%. Stress test was abnormal with 1mm inferior STD, reversible ischemia in the inferior wall and fixed defects in the lateral, anterior and apical walls with post stress EF of 24%. Pt was then transferred to [facility] for cardiac cath which showed pLAD 70% ISR, mLCx 70%, D1 severe dz. Patient now transferred to [facility] CTS Dr. Rivers for CABG eval. Patient denies any current SOB or chest pain on admission. Otherwise denies headaches, abdominal pain, urinary or bowel changes, fevers, chills, N/V/D, sick contacts.  (24 Dec 2024 05:07)      PAST MEDICAL & SURGICAL HISTORY:  Type 2 diabetes mellitus      Hypertension      End stage renal disease  started HD 2019 T, Th, Sat via right chest permacath      Bone spur  right shoulder- hx of - sx done      Anemia      Injury of right wrist  hx of at age 14      History of hyperkalemia  before HD- K-6.2 - to repeat in am of sx, pt had HD today      S/P arthroscopy of right shoulder        History of vascular access device  right chest permacath - 2019      H/O right wrist surgery  tendons repair - at age 14      AV fistula      H/O ventral hernia repair      S/P cholecystectomy          PREVIOUS DIAGNOSTIC TESTING:      ECHO  FINDINGS:    STRESS  FINDINGS:    CATHETERIZATION  FINDINGS:    MEDICATIONS  (STANDING):  aspirin enteric coated 81 milliGRAM(s) Oral daily  atorvastatin 80 milliGRAM(s) Oral at bedtime  calcium acetate 667 milliGRAM(s) Oral three times a day with meals  carvedilol 12.5 milliGRAM(s) Oral every 12 hours  enoxaparin Injectable 30 milliGRAM(s) SubCutaneous every 24 hours  epoetin ignacia (EPOGEN) Injectable 64778 Unit(s) IV Push <User Schedule>  insulin lispro (ADMELOG) corrective regimen sliding scale   SubCutaneous three times a day before meals  insulin lispro (ADMELOG) corrective regimen sliding scale   SubCutaneous at bedtime  pantoprazole    Tablet 40 milliGRAM(s) Oral before breakfast  sodium chloride 0.9% lock flush 3 milliLiter(s) IV Push every 8 hours  traZODone 100 milliGRAM(s) Oral at bedtime    MEDICATIONS  (PRN):      FAMILY HISTORY:  FH: hypertension  mother, father- alive    FH: type 2 diabetes  mother, father- alive        SOCIAL HISTORY:    CIGARETTES:    ALCOHOL:    REVIEW OF SYSTEMS:  CONSTITUTIONAL: No fever, weight loss, or fatigue  EYES: No eye pain, visual disturbances, or discharge  ENMT:  No difficulty hearing, tinnitus, vertigo; No sinus or throat pain  NECK: No pain or stiffness  RESPIRATORY: No cough, wheezing, chills or hemoptysis; No shortness of breath  CARDIOVASCULAR: No chest pain, palpitations, dizziness, or leg swelling  GASTROINTESTINAL: No abdominal or epigastric pain. No nausea, vomiting, or hematemesis; No diarrhea or constipation. No melena or hematochezia.  GENITOURINARY: No dysuria, frequency, hematuria, or incontinence  NEUROLOGICAL: No headaches, memory loss, loss of strength, numbness, or tremors  SKIN: No itching, burning, rashes, or lesions   LYMPH NODES: No enlarged glands  ENDOCRINE: No heat or cold intolerance; No hair loss  MUSCULOSKELETAL: No joint pain or swelling; No muscle, back, or extremity pain  PSYCHIATRIC: No depression, anxiety, mood swings, or difficulty sleeping  HEME/LYMPH: No easy bruising, or bleeding gums  ALLERY AND IMMUNOLOGIC: No hives or eczema    Vital Signs Last 24 Hrs  T(C): 36.5 (24 Dec 2024 20:07), Max: 37.1 (24 Dec 2024 04:16)  T(F): 97.7 (24 Dec 2024 20:07), Max: 98.8 (24 Dec 2024 04:16)  HR: 71 (24 Dec 2024 20:07) (65 - 75)  BP: 162/71 (24 Dec 2024 20:07) (149/76 - 166/86)  BP(mean): --  RR: 18 (24 Dec 2024 20:07) (18 - 18)  SpO2: 97% (24 Dec 2024 20:07) (92% - 97%)    Parameters below as of 24 Dec 2024 20:07  Patient On (Oxygen Delivery Method): room air          PHYSICAL EXAM:  GENERAL: NAD, well-groomed, well-developed  HEAD:  Atraumatic, Normocephalic  EYES: EOMI, PERRLA, conjunctiva and sclera clear  ENMT: No tonsillar erythema, exudates, or enlargement; Moist mucous membranes, Good dentition, No lesions  NECK: Supple, No JVD, Normal thyroid  NERVOUS SYSTEM:  Alert & Oriented X3, Good concentration; Motor Strength 5/5 B/L upper and lower extremities; DTRs 2+ intact and symmetric  CHEST/LUNG: Clear to percussion bilaterally; No rales, rhonchi, wheezing, or rubs  HEART: Regular rate and rhythm; No murmurs, rubs, or gallops  ABDOMEN: Soft, Nontender, Nondistended; Bowel sounds present  EXTREMITIES:  2+ Peripheral Pulses, No clubbing, cyanosis, or edema  LYMPH: No lymphadenopathy noted  SKIN: No rashes or lesions      INTERPRETATION OF TELEMETRY:    ECG:    ALONZODSVASC:     LABS:                        8.0    6.79  )-----------( 206      ( 24 Dec 2024 06:50 )             25.7     12-24    136  |  98  |  71[H]  ----------------------------<  116[H]  5.5[H]   |  23  |  7.65[H]    Ca    8.9      24 Dec 2024 06:50    TPro  7.4  /  Alb  3.4  /  TBili  0.3  /  DBili  x   /  AST  15  /  ALT  9[L]  /  AlkPhos  114  12-24        PT/INR - ( 24 Dec 2024 06:50 )   PT: 13.7 sec;   INR: 1.19 ratio         PTT - ( 24 Dec 2024 06:50 )  PTT:39.7 sec  Urinalysis Basic - ( 24 Dec 2024 07:24 )    Color: Yellow / Appearance: Clear / S.013 / pH: x  Gluc: x / Ketone: Negative mg/dL  / Bili: Negative / Urobili: 0.2 mg/dL   Blood: x / Protein: 100 mg/dL / Nitrite: Negative   Leuk Esterase: Negative / RBC: 1 /HPF / WBC 2 /HPF   Sq Epi: x / Non Sq Epi: 4 /HPF / Bacteria: Negative /HPF      Lipid Panel:   I&O's Summary    23 Dec 2024 07:01  -  24 Dec 2024 07:00  --------------------------------------------------------  IN: 140 mL / OUT: 0 mL / NET: 140 mL    24 Dec 2024 07:01  -  24 Dec 2024 21:08  --------------------------------------------------------  IN: 11 mL / OUT: 1000 mL / NET: -989 mL        RADIOLOGY & ADDITIONAL STUDIES:    < from: TTE W or WO Ultrasound Enhancing Agent (24 @ 14:25) >  CONCLUSIONS:      1. Left ventricular cavity is severely dilated. Left ventricular wall thickness is normal. Left ventricular systolic function is moderately to severely decreased with an ejection fraction of 35 % by Michaels's method of disks. Global left ventricular hypokinesis.   2. Elevated left ventricular filling pressure.   3. Severe tricuspid regurgitation.   4. Moderate-to-severe pulmonary hypertension.    < end of copied text >

## 2024-12-24 NOTE — H&P ADULT - NSHPLABSRESULTS_GEN_ALL_CORE
< from: Xray Chest 1 View- PORTABLE-Urgent (Xray Chest 1 View- PORTABLE-Urgent .) (12.20.24 @ 12:58) >      IMPRESSION:  Redemonstrated bilateral perihilar haziness and small bilateral pleural   effusions compatible with pulmonary edema.    --- End of Report ---    < end of copied text >    Cardiac cath prelim: pLAD 70% ISR, mLCx 70%, D1 severe dz. f/u official report

## 2024-12-24 NOTE — PROGRESS NOTE ADULT - PROBLEM SELECTOR PLAN 1
c/w ASA, Statin, coreg 12.5mg Q12   p2y12 321  Lvx 30mg q 24 hrs   12/24 carotids completed: No sig stenosis   12/20 TTE EF 40 - 45%, Mild MR, Mod Tricupsid regurgitation

## 2024-12-24 NOTE — PROGRESS NOTE ADULT - ASSESSMENT
54M with PMH of HTN, HLD, ESRD on HD MWF via LUE AVF, ascites (requiring repeat paracenteses q4-6wks), NICM with moderate LV dysfunction w/ EF 35-40%(2023) and CAD s/p atherectomy + SALLIE to D1(4/11/2024) presents to [facility] transferred from [facility]. [facility] with [facility] of breath. Of note he was recently admitted from 09/27-12/02 for acute cholecystitis s/p cholecystectomy. [facility], pt was hypoxic to 86% on RA, trop 1.7K, EKG no new changes, CXR fluid overload. Admitted for AHRF 2/2 fluid overload. Pt received HD on 12/18, 12/19, 12/20 and 12/22. DAPT was resumed, TTE showed improvement in LVEF to 40-45%. Stress test was abnormal with 1mm inferior STD, reversible ischemia in the inferior wall and fixed defects in the lateral, anterior and apical walls with post stress EF of 24%. Pt was then transferred to [facility] for cardiac cath which showed pLAD 70% ISR, mLCx 70%, D1 severe dz. Patient now transferred to [facility] CTS Dr. Rivers for CABG eval. Patient denies any current SOB or chest pain on admission. Otherwise denies headaches, abdominal pain, urinary or bowel changes, fevers, chills, N/V/D, sick contacts.    54M with PMH of HTN, HLD, ESRD on HD MWF via LUE AVF, ascites (requiring repeat paracenteses q4-6wks), NICM with moderate LV dysfunction w/ EF 35-40%(2023) and CAD s/p atherectomy + SALLIE to D1(4/11/2024) presents to [facility] transferred from [facility]. Patient initially presented to [facility] ED with shortness of breath. Of note he was recently admitted from 09/27-12/02 for acute cholecystitis s/p cholecystectomy. In [facility] ED, pt was hypoxic to 86% on RA, trop 1.7K, EKG no new changes, CXR fluid overload. Admitted for AHRF 2/2 fluid overload. Pt received HD on 12/18, 12/19, 12/20 and 12/22. DAPT was resumed, TTE showed improvement in LVEF to 40-45%. Stress test was abnormal with 1mm inferior STD, reversible ischemia in the inferior wall and fixed defects in the lateral, anterior and apical walls with post stress EF of 24%. Pt was then transferred to [facility] for cardiac cath which showed pLAD 70% ISR, mLCx 70%, D1 severe dz. Patient now transferred to [facility] CTS Dr. Rivers for CABG eval. Patient denies any current SOB or chest pain on admission. Otherwise denies headaches, abdominal pain, urinary or bowel changes, fevers, chills, N/V/D, sick contacts.

## 2024-12-24 NOTE — DISCHARGE NOTE NURSING/CASE MANAGEMENT/SOCIAL WORK - FINANCIAL ASSISTANCE
[facility] provides services at a reduced cost to those who are determined to be eligible through [facility]’s financial assistance program. Information regarding [facility]’s financial assistance program can be found by going to [email or web address] or by calling [phone].

## 2024-12-24 NOTE — H&P ADULT - ASSESSMENT
54M with PMH of HTN, HLD, ESRD on HD MWF via LUE AVF, ascites (requiring repeat paracenteses q4-6wks), NICM with moderate LV dysfunction w/ EF 35-40%(2023) and CAD s/p atherectomy + SALLIE to D1(4/11/2024) presents to [facility] transferred from [facility]. Patient initially presented to [facility] ED with shortness of breath. Of note he was recently admitted from 09/27-12/02 for acute cholecystitis s/p cholecystectomy. In [facility] ED, pt was hypoxic to 86% on RA, trop 1.7K, EKG no new changes, CXR fluid overload. Admitted for AHRF 2/2 fluid overload. Pt received HD on 12/18, 12/19, 12/20 and 12/22. DAPT was resumed, TTE showed improvement in LVEF to 40-45%. Stress test was abnormal with 1mm inferior STD, reversible ischemia in the inferior wall and fixed defects in the lateral, anterior and apical walls with post stress EF of 24%. Pt was then transferred to [facility] for cardiac cath which showed pLAD 70% ISR, mLCx 70%, D1 severe dz. Patient now transferred to [facility] CTS Dr. Rivers for CABG eval. Patient denies any current SOB or chest pain on admission. Otherwise denies headaches, abdominal pain, urinary or bowel changes, fevers, chills, N/V/D, sick contacts.

## 2024-12-24 NOTE — PROGRESS NOTE ADULT - SUBJECTIVE AND OBJECTIVE BOX
VITAL SIGNS    Subjective:     Telemetry:      Vital Signs Last 24 Hrs  T(C): 37.1 (24 @ 04:16), Max: 37.1 (24 @ 04:16)  T(F): 98.8 (24 @ 04:16), Max: 98.8 (24 @ 04:16)  HR: 66 (24 @ 06:07) (66 - 88)  BP: 165/83 (24 @ 06:07) (149/76 - 166/86)  RR: 18 (24 @ 04:16) (14 - 19)  SpO2: 92% (24 @ 04:16) (92% - 96%)                @ 07:01  -   @ 07:00  --------------------------------------------------------  IN: 140 mL / OUT: 0 mL / NET: 140 mL     @ 07:01  -   @ 13:15  --------------------------------------------------------  IN: 11 mL / OUT: 0 mL / NET: 11 mL        LABS      136  |  98  |  71[H]  ----------------------------<  116[H]  5.5[H]   |  23  |  7.65[H]    Ca    8.9      24 Dec 2024 06:50    TPro  7.4  /  Alb  3.4  /  TBili  0.3  /  DBili  x   /  AST  15  /  ALT  9[L]  /  AlkPhos  114                                   8.0    6.79  )-----------( 206      ( 24 Dec 2024 06:50 )             25.7          PT/INR - ( 24 Dec 2024 06:50 )   PT: 13.7 sec;   INR: 1.19 ratio         PTT - ( 24 Dec 2024 06:50 )  PTT:39.7 sec        Daily Height in cm: 180.34 (24 Dec 2024 04:16)    Daily Weight in k.6 (24 Dec 2024 04:16)      CAPILLARY BLOOD GLUCOSE      POCT Blood Glucose.: 131 mg/dL (24 Dec 2024 11:40)  POCT Blood Glucose.: 110 mg/dL (24 Dec 2024 07:54)          Drains:     MS         [  ] Drainage:                 L Pleural  [  ]  Drainage:                R Pleural  [  ]  Drainage:    Pacing Wires        [  ]   Settings:                                  Isolated  [  ]    Coumadin    [ ] YES          [  ]      NO         Reason:           PHYSICAL EXAM      Neurology: alert and oriented x 3, nonfocal, no gross deficits    CV:     Sternal Wound: MSI -->CDI, sternum stable    Lungs: CTA B/L     Abdomen: soft, nontender, nondistended, positive bowel sounds, (+) Flatus; (+) BM     :  Voiding               Extremities:  B/L LE (+) edema; negative calf tenderness; (+) 2 DP palpable          aspirin enteric coated 81 milliGRAM(s) Oral daily  atorvastatin 80 milliGRAM(s) Oral at bedtime  calcium acetate 667 milliGRAM(s) Oral three times a day with meals  carvedilol 12.5 milliGRAM(s) Oral every 12 hours  enoxaparin Injectable 30 milliGRAM(s) SubCutaneous every 24 hours  epoetin ignacia (EPOGEN) Injectable 36751 Unit(s) IV Push <User Schedule>  epoetin ignacia (PROCRIT) Injectable 06226 Unit(s) IV Push once  insulin lispro (ADMELOG) corrective regimen sliding scale   SubCutaneous three times a day before meals  insulin lispro (ADMELOG) corrective regimen sliding scale   SubCutaneous at bedtime  pantoprazole    Tablet 40 milliGRAM(s) Oral before breakfast  sodium chloride 0.9% lock flush 3 milliLiter(s) IV Push every 8 hours  traZODone 100 milliGRAM(s) Oral at bedtime                 Physical Therapy Rec:   Home  [  ]   Home w/ PT  [  ]  Rehab  [  ]    Discussed with Cardiothoracic Team at AM rounds.     VITAL SIGNS    Subjective: "Im okay" Denies CP, palpitation, SOB, WEBSTER, HA, dizziness, N/V/D, fever or chills.  No acute event noted overnight.    Telemetry:  NSR 70s     Vital Signs Last 24 Hrs  T(C): 37.1 (24 @ 04:16), Max: 37.1 (24 @ 04:16)  T(F): 98.8 (24 @ 04:16), Max: 98.8 (24 @ 04:16)  HR: 66 (24 @ 06:07) (66 - 88)  BP: 165/83 (24 @ 06:07) (149/76 - 166/86)  RR: 18 (24 @ 04:16) (14 - 19)  SpO2: 92% (24 @ 04:16) (92% - 96%)                @ 07:01  -   @ 07:00  --------------------------------------------------------  IN: 140 mL / OUT: 0 mL / NET: 140 mL     @ 07:01  -   @ 13:15  --------------------------------------------------------  IN: 11 mL / OUT: 0 mL / NET: 11 mL        LABS      136  |  98  |  71[H]  ----------------------------<  116[H]  5.5[H]   |  23  |  7.65[H]    Ca    8.9      24 Dec 2024 06:50    TPro  7.4  /  Alb  3.4  /  TBili  0.3  /  DBili  x   /  AST  15  /  ALT  9[L]  /  AlkPhos  114                                   8.0    6.79  )-----------( 206      ( 24 Dec 2024 06:50 )             25.7          PT/INR - ( 24 Dec 2024 06:50 )   PT: 13.7 sec;   INR: 1.19 ratio         PTT - ( 24 Dec 2024 06:50 )  PTT:39.7 sec        Daily Height in cm: 180.34 (24 Dec 2024 04:16)    Daily Weight in k.6 (24 Dec 2024 04:16)      CAPILLARY BLOOD GLUCOSE      POCT Blood Glucose.: 131 mg/dL (24 Dec 2024 11:40)  POCT Blood Glucose.: 110 mg/dL (24 Dec 2024 07:54)          Drains:     MS         [  ] Drainage:                 L Pleural  [  ]  Drainage:                R Pleural  [  ]  Drainage:    Pacing Wires        [  ]   Settings:                                  Isolated  [  ]    Coumadin    [ ] YES          [  ]      NO         Reason:           PHYSICAL EXAM      Neurology: alert and oriented x 3, nonfocal, no gross deficits    CV: +S1, S2. no heart murmurs heard     Lungs: CTA B/L     Abdomen: soft, nontender, nondistended, positive bowel sounds, (+) Flatus; (+) BM     :  Voiding               Extremities:  B/L LE +1  edema; negative calf tenderness; (+) 2 DP palpable        + LUE AVF +thrill       aspirin enteric coated 81 milliGRAM(s) Oral daily  atorvastatin 80 milliGRAM(s) Oral at bedtime  calcium acetate 667 milliGRAM(s) Oral three times a day with meals  carvedilol 12.5 milliGRAM(s) Oral every 12 hours  enoxaparin Injectable 30 milliGRAM(s) SubCutaneous every 24 hours  epoetin ignacia (EPOGEN) Injectable 24667 Unit(s) IV Push <User Schedule>  epoetin ignacia (PROCRIT) Injectable 59491 Unit(s) IV Push once  insulin lispro (ADMELOG) corrective regimen sliding scale   SubCutaneous three times a day before meals  insulin lispro (ADMELOG) corrective regimen sliding scale   SubCutaneous at bedtime  pantoprazole    Tablet 40 milliGRAM(s) Oral before breakfast  sodium chloride 0.9% lock flush 3 milliLiter(s) IV Push every 8 hours  traZODone 100 milliGRAM(s) Oral at bedtime                 Physical Therapy Rec:   Home  [ x ]   Home w/ PT  [  ]  Rehab  [  ]    Discussed with Cardiothoracic Team at AM rounds.

## 2024-12-24 NOTE — H&P ADULT - NSHPSOCIALHISTORY_GEN_ALL_CORE
Chemo/Rad to chest: Denies   Implantable Devices: Denies  ADLs: Independent   Residence: Lives with family, retired, drove taxi and owed a business

## 2024-12-24 NOTE — H&P ADULT - HISTORY OF PRESENT ILLNESS
54M with PMH of HTN, HLD, ESRD on HD MWF via LUE AVF, ascites (requiring repeat paracenteses q4-6wks), NICM with moderate LV dysfunction w/ EF 35-40%(2023) and CAD s/p atherectomy + SALLIE to D1(4/11/2024) presents to [facility] transferred from [facility]. Patient initially presented to [facility] ED with shortness of breath. Of note he was recently admitted from 09/27-12/02 for acute cholecystitis s/p cholecystectomy. In [facility] ED, pt was hypoxic to 86% on RA, trop 1.7K, EKG no new changes, CXR fluid overload. Admitted for AHRF 2/2 fluid overload. Pt received HD on 12/18, 12/19, 12/20 and 12/22. DAPT was resumed, TTE showed improvement in LVEF to 40-45%. Stress test was abnormal with 1mm inferior STD, reversible ischemia in the inferior wall and fixed defects in the lateral, anterior and apical walls with post stress EF of 24%. Pt was then transferred to [facility] for cardiac cath which showed pLAD 70% ISR, mLCx 70%, D1 severe dz. Patient now transferred to [facility] CTS Dr. Rivers for CABG eval. Patient denies any current SOB or chest pain on admission. Otherwise denies headaches, abdominal pain, urinary or bowel changes, fevers, chills, N/V/D, sick contacts.

## 2024-12-24 NOTE — PATIENT PROFILE ADULT - FALL HARM RISK - TYPE OF ASSESSMENT
----- Message from Ursula Mcneal PA-C sent at 11/20/2017  5:05 PM CST -----  Please inform CXR with chronic changes, shows heart size upper limits of normal, some scarring of lung bases and scoliosis.  No further evaluation needed at this time. She may want to have a copy of report.   Admission

## 2024-12-24 NOTE — DISCHARGE NOTE NURSING/CASE MANAGEMENT/SOCIAL WORK - NSDCVIVACCINE_GEN_ALL_CORE_FT
Tdap; 14-Dec-2021 19:18; Roxi Lewis (MARIA ELENA); [employer]; [id number] (Exp. Date: 09-Sep-2023); IntraMuscular; Deltoid Right.; 0.5 milliLiter(s); VIS (VIS Published: 09-May-2013, VIS Presented: 14-Dec-2021);

## 2024-12-24 NOTE — CONSULT NOTE ADULT - ASSESSMENT
54M with PMH of HTN, HLD, ESRD on HD MWF via LUE AVF, ascites (requiring repeat paracenteses q4-6wks), NICM with moderate LV dysfunction w/ EF 35-40%(2023) and CAD s/p atherectomy + SALLIE to D1(4/11/2024) presents to [facility] transferred from [facility] for CABG eval  Nephro on [facility] for HD    ESRD on HD   Regular scheduele MWF   Access LUE AVF  Last DH on 12/22  Will arrange for dialysis today as Unit Closed for Holiday tomm   Will reassess daily for HD needs  Keep MAP>65  Renal Diet    HTN  resume home meds    CKD MBD  Can send a PTH  Ca and Phos daily    Anemia  CRISTIANE with HD  Trnasfuse if hgb <7    CAD with multivessel disease  Planned for CABG

## 2024-12-24 NOTE — H&P ADULT - PROBLEM SELECTOR PLAN 1
Admit to CTS Tita Mcneill  Cath Report noted above f/u official report   c/w ASA, Statin, coreg 12.5mg Q12   Plavix on hold in preop setting, f/u p2y12   c/w heparin gtt from [facility]  lisinopril on hold in preop setting  Preop w/u in progress- Carotids, PFTs, TFTs  Tentative OR Date: TBD  All Labs and imaging to be reviewed by Dr. Rivers

## 2024-12-24 NOTE — PATIENT PROFILE ADULT - FALL HARM RISK - HARM RISK INTERVENTIONS

## 2024-12-24 NOTE — PATIENT PROFILE ADULT - HOME ACCESSIBILITY CONCERNS
1 episode of clear vomit. Zofran given. No recent food consumption, but patient did drink water quickly. States he feels better now. Patient has had bath, new gown and linens, all dressings changed. none

## 2024-12-24 NOTE — CONSULT NOTE ADULT - ASSESSMENT
54M with PMH of HTN, HLD, ESRD on HD MWF via LUE AVF, ascites (requiring repeat paracenteses q4-6wks), NICM with moderate LV dysfunction w/ EF 35-40%(2023) and CAD s/p atherectomy + SALLIE to D1(4/11/2024) presents to [facility] transferred from [facility]. Patient initially presented to [facility] ED with shortness of breath. Of note he was recently admitted from 09/27-12/02 for acute cholecystitis s/p cholecystectomy. In [facility] ED, pt was hypoxic to 86% on RA, trop 1.7K, EKG no new changes, CXR fluid overload. Admitted for AHRF 2/2 fluid overload. Pt received HD on 12/18, 12/19, 12/20 and 12/22. DAPT was resumed, TTE showed improvement in LVEF to 40-45%. Stress test was abnormal with 1mm inferior STD, reversible ischemia in the inferior wall and fixed defects in the lateral, anterior and apical walls with post stress EF of 24%. Pt was then transferred to [facility] for cardiac cath which showed pLAD 70% ISR, mLCx 70%, D1 severe dz. Patient now transferred to [facility] CTS Dr. Rivers for CABG eval. Patient denies any current SOB or chest pain on admission. Otherwise denies headaches, abdominal pain, urinary or bowel changes, fevers, chills, N/V/D, sick contacts.          Problem/Plan - 1:  ·  Problem: CAD, multiple vessel.   ·  Plan: c/w ASA, Statin, coreg 12.5mg Q12   p2y12 321  Lvx 30mg q 24 hrs   12/24 carotids completed: No sig stenosis   12/20 TTE EF 40 - 45%, Mild MR, Mod Tricupsid regurgitation.     Problem/Plan - 2:  ·  Problem: ESRD on dialysis.   ·  Plan: on HD- typical schedule MWF via LUE AVF   12/24 Dialysis today   Nephrology following.     Problem/Plan - 3:  ·  Problem: Diabetes.   ·  Plan: A1C 5.6   ISS  monitor finger sticks TID with meals and QHS.

## 2024-12-24 NOTE — DISCHARGE NOTE NURSING/CASE MANAGEMENT/SOCIAL WORK - PATIENT PORTAL LINK FT
You can access the FollowMyHealth Patient Portal offered by [facility] by registering at the following website: [email or web address]. By joining [facility]’s FollowMyHealth portal, you will also be able to view your health information using other applications (apps) compatible with our system.

## 2024-12-25 LAB
ANION GAP SERPL CALC-SCNC: 13 MMOL/L — SIGNIFICANT CHANGE UP (ref 5–17)
AST SERPL-CCNC: 14 U/L — SIGNIFICANT CHANGE UP (ref 10–40)
BILIRUB SERPL-MCNC: 0.3 MG/DL — SIGNIFICANT CHANGE UP (ref 0.2–1.2)
BUN SERPL-MCNC: 53 MG/DL — HIGH (ref 7–23)
CALCIUM SERPL-MCNC: 8.9 MG/DL — SIGNIFICANT CHANGE UP (ref 8.4–10.5)
CO2 SERPL-SCNC: 26 MMOL/L — SIGNIFICANT CHANGE UP (ref 22–31)
CREAT SERPL-MCNC: 6.47 MG/DL — HIGH (ref 0.5–1.3)
EGFR: 9 ML/MIN/1.73M2 — LOW
EGFR: 9 ML/MIN/1.73M2 — LOW
GLUCOSE SERPL-MCNC: 109 MG/DL — HIGH (ref 70–99)
HCT VFR BLD CALC: 25.9 % — LOW (ref 39–50)
HGB BLD-MCNC: 8.1 G/DL — LOW (ref 13–17)
MAGNESIUM SERPL-MCNC: 1.9 MG/DL — SIGNIFICANT CHANGE UP (ref 1.6–2.6)
MCHC RBC-ENTMCNC: 31.3 G/DL — LOW (ref 32–36)
MCHC RBC-ENTMCNC: 31.6 PG — SIGNIFICANT CHANGE UP (ref 27–34)
MCV RBC AUTO: 101.2 FL — HIGH (ref 80–100)
NRBC # BLD: 0 /100 WBCS — SIGNIFICANT CHANGE UP (ref 0–0)
NRBC BLD-RTO: 0 /100 WBCS — SIGNIFICANT CHANGE UP (ref 0–0)
PHOSPHATE SERPL-MCNC: 4.2 MG/DL — SIGNIFICANT CHANGE UP (ref 2.5–4.5)
POTASSIUM SERPL-MCNC: 5.2 MMOL/L — SIGNIFICANT CHANGE UP (ref 3.5–5.3)
POTASSIUM SERPL-SCNC: 5.2 MMOL/L — SIGNIFICANT CHANGE UP (ref 3.5–5.3)
PROT SERPL-MCNC: 7.3 G/DL — SIGNIFICANT CHANGE UP (ref 6–8.3)
RBC # BLD: 2.56 M/UL — LOW (ref 4.2–5.8)
RBC # FLD: 15.9 % — HIGH (ref 10.3–14.5)
SODIUM SERPL-SCNC: 140 MMOL/L — SIGNIFICANT CHANGE UP (ref 135–145)
WBC # BLD: 7.27 K/UL — SIGNIFICANT CHANGE UP (ref 3.8–10.5)
WBC # FLD AUTO: 7.27 K/UL — SIGNIFICANT CHANGE UP (ref 3.8–10.5)

## 2024-12-25 PROCEDURE — 99232 SBSQ HOSP IP/OBS MODERATE 35: CPT

## 2024-12-25 PROCEDURE — 93010 ELECTROCARDIOGRAM REPORT: CPT

## 2024-12-25 RX ORDER — AMLODIPINE BESYLATE 10 MG/1
10 TABLET ORAL AT BEDTIME
Refills: 0 | Status: DISCONTINUED | OUTPATIENT
Start: 2024-12-25 | End: 2024-12-30

## 2024-12-25 RX ADMIN — Medication 81 MILLIGRAM(S): at 11:52

## 2024-12-25 RX ADMIN — ATORVASTATIN CALCIUM 80 MILLIGRAM(S): 80 TABLET, FILM COATED ORAL at 21:43

## 2024-12-25 RX ADMIN — Medication 667 MILLIGRAM(S): at 07:57

## 2024-12-25 RX ADMIN — Medication 40 MILLIGRAM(S): at 06:14

## 2024-12-25 RX ADMIN — Medication 3 MILLILITER(S): at 21:07

## 2024-12-25 RX ADMIN — ENOXAPARIN SODIUM 30 MILLIGRAM(S): 100 INJECTION SUBCUTANEOUS at 11:52

## 2024-12-25 RX ADMIN — Medication 3 MILLILITER(S): at 13:14

## 2024-12-25 RX ADMIN — CARVEDILOL 12.5 MILLIGRAM(S): 3.12 TABLET, FILM COATED ORAL at 06:14

## 2024-12-25 RX ADMIN — AMLODIPINE BESYLATE 10 MILLIGRAM(S): 10 TABLET ORAL at 22:39

## 2024-12-25 RX ADMIN — Medication 3 MILLILITER(S): at 06:28

## 2024-12-25 RX ADMIN — CARVEDILOL 12.5 MILLIGRAM(S): 3.12 TABLET, FILM COATED ORAL at 17:20

## 2024-12-25 RX ADMIN — Medication 100 MILLIGRAM(S): at 21:42

## 2024-12-25 RX ADMIN — Medication 667 MILLIGRAM(S): at 11:52

## 2024-12-25 RX ADMIN — Medication 667 MILLIGRAM(S): at 17:19

## 2024-12-25 NOTE — CONSULT NOTE ADULT - ASSESSMENT
54M with PMH of HTN, HLD, ESRD on HD MWF via LUE AVF, ascites (requiring repeat paracenteses q4-6wks), NICM with moderate LV dysfunction w/ EF 35-40%(2023) and CAD s/p atherectomy + SALLIE to D1(4/11/2024) presents to [facility] transferred from [facility]. Patient initially presented to [facility] ED with shortness of breath. Of note he was recently admitted from 09/27-12/02 for acute cholecystitis s/p cholecystectomy. In [facility] ED, pt was hypoxic to 86% on RA, trop 1.7K, EKG no new changes, CXR fluid overload. Admitted for AHRF 2/2 fluid overload. Pt received HD on 12/18, 12/19, 12/20 and 12/22. DAPT was resumed, TTE showed improvement in LVEF to 40-45%. Stress test was abnormal with 1mm inferior STD, reversible ischemia in the inferior wall and fixed defects in the lateral, anterior and apical walls with post stress EF of 24%. Pt was then transferred to [facility] for cardiac cath which showed pLAD 70% ISR, mLCx 70%, D1 severe dz. Patient now transferred to [facility] CTS Dr. Rivers for CABG eval.     CAD, multiple vessel:    CTS/Cardio eval appreciated.  For CABG  ASA/Coreg/Atorvastatin    ESRD:    Nephro f/up appreciated  On HD  On Epogen

## 2024-12-25 NOTE — PROGRESS NOTE ADULT - SUBJECTIVE AND OBJECTIVE BOX
Subjective " hello "    VITAL SIGNS    Telemetry:  NSR 60-90    Vital Signs Last 24 Hrs  T(C): 37 (24 @ 04:55), Max: 37 (24 @ 04:55)  T(F): 98.6 (24 @ 04:55), Max: 98.6 (24 @ 04:55)  HR: 70 (24 @ 06:10) (65 - 76)  BP: 153/73 (24 @ 06:10) (153/72 - 173/76)  RR: 18 (24 @ 04:55) (18 - 18)  SpO2: 93% (24 @ 04:55) (93% - 97%)              @ 07:01  -   @ 07:00  --------------------------------------------------------  IN: 211 mL / OUT: 1000 mL / NET: -789 mL     @ 07:01  -   @ 13:26  --------------------------------------------------------  IN: 360 mL / OUT: 0 mL / NET: 360 mL    Daily Weight in k.3 (25 Dec 2024 09:00)                            8.1    7.27  )-----------( 215      ( 25 Dec 2024 10:39 )             25.9           140  |  101  |  53[H]  ----------------------------<  109[H]  5.2   |  26  |  6.47[H]    Ca    8.9      25 Dec 2024 10:39  Phos  4.2       Mg     1.9         TPro  7.3  /  Alb  3.5  /  TBili  0.3  /  DBili  x   /  AST  14  /  ALT  9[L]  /  AlkPhos  112  12-25    MEDICATIONS  (STANDING):  amLODIPine   Tablet 5 milliGRAM(s) Oral daily  aspirin enteric coated 81 milliGRAM(s) Oral daily  atorvastatin 80 milliGRAM(s) Oral at bedtime  calcium acetate 667 milliGRAM(s) Oral three times a day with meals  carvedilol 12.5 milliGRAM(s) Oral every 12 hours  enoxaparin Injectable 30 milliGRAM(s) SubCutaneous every 24 hours  epoetin ignacia (EPOGEN) Injectable 85472 Unit(s) IV Push <User Schedule>  insulin lispro (ADMELOG) corrective regimen sliding scale   SubCutaneous three times a day before meals  insulin lispro (ADMELOG) corrective regimen sliding scale   SubCutaneous at bedtime  pantoprazole    Tablet 40 milliGRAM(s) Oral before breakfast  sodium chloride 0.9% lock flush 3 milliLiter(s) IV Push every 8 hours  traZODone 100 milliGRAM(s) Oral at bedtime      POCT Blood Glucose.: 143 mg/dL (25 Dec 2024 11:28)  POCT Blood Glucose.: 114 mg/dL (25 Dec 2024 07:46)  POCT Blood Glucose.: 215 mg/dL (24 Dec 2024 21:39)  POCT Blood Glucose.: 132 mg/dL (24 Dec 2024 18:14)                                PHYSICAL EXAM        Neurology: alert and oriented x 3, nonfocal, no gross deficits    CV : S1 S2  Lungs: B/l CT a on room air    Abdomen:  fluctuant t ascites soft, tender, nondistended, positive bowel sounds,  + BM    :voids               Extremities:   warm well perfused equal strength throughout    B/lle + DP                                         Discussed with Cardiothoracic Team at AM rounds.

## 2024-12-25 NOTE — PROGRESS NOTE ADULT - SUBJECTIVE AND OBJECTIVE BOX
[facility] [place] [facility]   MD DREW VIZCARRA MD RUORU WONG, PA    TEL:  FROM 9 AM to 5 PM ---OFFICE: [phone]  AVAILABLE ON TEAMS    FROM 5 PM - 9 AM PLEASE CALL ANSWERING SERVICE: [phone]    RENAL FOLLOW UP NOTE--Date of Service 12-25-24 @ 09:21  --------------------------------------------------------------------------------  HPI:      Pt seen and examined at bedside.   Youies SOB, chest pain     PAST HISTORY  --------------------------------------------------------------------------------  No significant changes to PMH, PSH, FHx, SHx, unless otherwise noted    ALLERGIES & MEDICATIONS  --------------------------------------------------------------------------------  Allergies    No Known Allergies    Intolerances      Standing Inpatient Medications  amLODIPine   Tablet 5 milliGRAM(s) Oral daily  aspirin enteric coated 81 milliGRAM(s) Oral daily  atorvastatin 80 milliGRAM(s) Oral at bedtime  calcium acetate 667 milliGRAM(s) Oral three times a day with meals  carvedilol 12.5 milliGRAM(s) Oral every 12 hours  enoxaparin Injectable 30 milliGRAM(s) SubCutaneous every 24 hours  epoetin ignacia (EPOGEN) Injectable 94731 Unit(s) IV Push <User Schedule>  insulin lispro (ADMELOG) corrective regimen sliding scale   SubCutaneous three times a day before meals  insulin lispro (ADMELOG) corrective regimen sliding scale   SubCutaneous at bedtime  pantoprazole    Tablet 40 milliGRAM(s) Oral before breakfast  sodium chloride 0.9% lock flush 3 milliLiter(s) IV Push every 8 hours  traZODone 100 milliGRAM(s) Oral at bedtime    PRN Inpatient Medications      REVIEW OF SYSTEMS  --------------------------------------------------------------------------------  General: no fever    MSK: no edema     VITALS/PHYSICAL EXAM  --------------------------------------------------------------------------------  T(C): 37 (12-25-24 @ 04:55), Max: 37 (12-25-24 @ 04:55)  HR: 70 (12-25-24 @ 06:10) (65 - 76)  BP: 153/73 (12-25-24 @ 06:10) (153/72 - 173/76)  RR: 18 (12-25-24 @ 04:55) (18 - 18)  SpO2: 93% (12-25-24 @ 04:55) (93% - 97%)  Wt(kg): --  Height (cm): 180.3 (12-24-24 @ 04:16)  Weight (kg): 83.6 (12-24-24 @ 04:16)  BMI (kg/m2): 25.7 (12-24-24 @ 04:16)  BSA (m2): 2.04 (12-24-24 @ 04:16)      12-24-24 @ 07:01  -  12-25-24 @ 07:00  --------------------------------------------------------  IN: 211 mL / OUT: 1000 mL / NET: -789 mL      Physical Exam:  	Gen: NAD  	HEENT: MMM  	Pulm: CTA B/L  	CV: S1S2  	Abd: Soft, +BS  	Ext: No LE edema B/L                      Neuro: Awake   	Skin: Warm and Dry   	Vascular access: NO HD catheter             no blunt  LABS/STUDIES  --------------------------------------------------------------------------------              8.0    6.79  >-----------<  206      [12-24-24 @ 06:50]              25.7     136  |  98  |  71  ----------------------------<  116      [12-24-24 @ 06:50]  5.5   |  23  |  7.65        Ca     8.9     [12-24-24 @ 06:50]    TPro  7.4  /  Alb  3.4  /  TBili  0.3  /  DBili  x   /  AST  15  /  ALT  9   /  AlkPhos  114  [12-24-24 @ 06:50]    PT/INR: PT 13.7 , INR 1.19       [12-24-24 @ 06:50]  PTT: 39.7       [12-24-24 @ 06:50]      Creatinine Trend:  SCr 7.65 [12-24 @ 06:50]  SCr 7.48 [12-22 @ 08:59]  SCr 6.44 [12-20 @ 05:20]  SCr 4.77 [12-19 @ 05:00]  SCr 3.89 [12-18 @ 20:23]    Urinalysis - [12-24-24 @ 07:24]      Color Yellow / Appearance Clear / SG 1.013 / pH 8.5      Gluc 100 / Ketone Negative  / Bili Negative / Urobili 0.2       Blood Negative / Protein 100 / Leuk Est Negative / Nitrite Negative      RBC 1 / WBC 2 / Hyaline  / Gran  / Sq Epi  / Non Sq Epi 4 / Bacteria Negative      Iron 29, TIBC 143, %sat 20      [12-19-24 @ 05:00]  Ferritin 904      [12-19-24 @ 05:00]  TSH 4.75      [12-24-24 @ 06:50]    HBsAg Nonreact      [12-19-24 @ 05:00]     No cervical/supraclavicular lymphadenopathy palpated on exam.

## 2024-12-25 NOTE — PROGRESS NOTE ADULT - ASSESSMENT
54M with PMH of HTN, HLD, ESRD on HD MWF via LUE AVF, ascites (requiring repeat paracenteses q4-6wks), NICM with moderate LV dysfunction w/ EF 35-40%(2023) and CAD s/p atherectomy + SALLIE to D1(4/11/2024) presents to [facility] transferred from [facility] for CABG eval  Nephro on baGervais for HD    ESRD on HD   Regular scheduele MWF   Access LUE AVF  Last HD on 12/24  Will reassess daily for HD needs  Keep MAP>65  Renal Diet    HTN  BP flucutaitng   cw home meds    CKD MBD  Can send a PTH  Ca and Phos daily    Anemia  CRISTIANE with HD  Trnasfuse if hgb <7    CAD with multivessel disease  Planned for CABG

## 2024-12-25 NOTE — PROVIDER CONTACT NOTE (OTHER) - ACTION/TREATMENT ORDERED:
12.5mg PO Coreg administered as per routine order. No further interventions at this time as per Tarah Harris NP.

## 2024-12-25 NOTE — PROGRESS NOTE ADULT - ASSESSMENT
54M with PMH of HTN, HLD, ESRD on HD MWF via LUE AVF, ascites (requiring repeat paracenteses q4-6wks), NICM with moderate LV dysfunction w/ EF 35-40%(2023) and CAD s/p atherectomy + SALLIE to D1(4/11/2024) presents to [facility] transferred from [facility]. Patient initially presented to [facility] ED with shortness of breath. Of note he was recently admitted from 09/27-12/02 for acute cholecystitis s/p cholecystectomy. In [facility] ED, pt was hypoxic to 86% on RA, trop 1.7K, EKG no new changes, CXR fluid overload. Admitted for AHRF 2/2 fluid overload. Pt received HD on 12/18, 12/19, 12/20 and 12/22. DAPT was resumed, TTE showed improvement in LVEF to 40-45%. Stress test was abnormal with 1mm inferior STD, reversible ischemia in the inferior wall and fixed defects in the lateral, anterior and apical walls with post stress EF of 24%. Pt was then transferred to [facility] for cardiac cath which showed pLAD 70% ISR, mLCx 70%, D1 severe dz. Patient now transferred to [facility] CTS Dr. Rivers for CABG eval. Patient denies any current SOB or chest pain on admission. Otherwise denies headaches, abdominal pain, urinary or bowel changes, fevers, chills, N/V/D, sick contacts.  12/ 25 VSS  CP free   HD via avf  for daily HD pre op- on lovenox 30 qd

## 2024-12-25 NOTE — PROGRESS NOTE ADULT - ASSESSMENT
54M with PMH of HTN, HLD, ESRD on HD MWF via LUE AVF, ascites (requiring repeat paracenteses q4-6wks), NICM with moderate LV dysfunction w/ EF 35-40%(2023) and CAD s/p atherectomy + SALLIE to D1(4/11/2024) presents to [facility] transferred from [facility]. Patient initially presented to [facility] ED with shortness of breath. Of note he was recently admitted from 09/27-12/02 for acute cholecystitis s/p cholecystectomy. In [facility] ED, pt was hypoxic to 86% on RA, trop 1.7K, EKG no new changes, CXR fluid overload. Admitted for AHRF 2/2 fluid overload. Pt received HD on 12/18, 12/19, 12/20 and 12/22. DAPT was resumed, TTE showed improvement in LVEF to 40-45%. Stress test was abnormal with 1mm inferior STD, reversible ischemia in the inferior wall and fixed defects in the lateral, anterior and apical walls with post stress EF of 24%. Pt was then transferred to [facility] for cardiac cath which showed pLAD 70% ISR, mLCx 70%, D1 severe dz. Patient now transferred to [facility] CTS Dr. Rivers for CABG eval. Patient denies any current SOB or chest pain on admission. Otherwise denies headaches, abdominal pain, urinary or bowel changes, fevers, chills, N/V/D, sick contacts.  12/ 25 VSS  CP free   HD via avf  for daily HD pre op- on lovenox 30 qd

## 2024-12-25 NOTE — PROGRESS NOTE ADULT - SUBJECTIVE AND OBJECTIVE BOX
PATIENT SEEN AND EXAMINED BY JEAN PAUL GABRIEL M.D. ON :- 12/25/24  DATE OF SERVICE:   12/25/24          Interim events noted,Labs ,Radiological studies and Cardiology tests reviewed .    Patient is a 54y old  Male who presents with a chief complaint of CAD (25 Dec 2024 13:23)      HPI:  54M with PMH of HTN, HLD, ESRD on HD MWF via LUE AVF, ascites (requiring repeat paracenteses q4-6wks), NICM with moderate LV dysfunction w/ EF 35-40%(2023) and CAD s/p atherectomy + SALLIE to D1(4/11/2024) presents to [facility] transferred from [facility]. Patient initially presented to [facility] ED with shortness of breath. Of note he was recently admitted from 09/27-12/02 for acute cholecystitis s/p cholecystectomy. In [facility] ED, pt was hypoxic to 86% on RA, trop 1.7K, EKG no new changes, CXR fluid overload. Admitted for AHRF 2/2 fluid overload. Pt received HD on 12/18, 12/19, 12/20 and 12/22. DAPT was resumed, TTE showed improvement in LVEF to 40-45%. Stress test was abnormal with 1mm inferior STD, reversible ischemia in the inferior wall and fixed defects in the lateral, anterior and apical walls with post stress EF of 24%. Pt was then transferred to [facility] for cardiac cath which showed pLAD 70% ISR, mLCx 70%, D1 severe dz. Patient now transferred to [facility] CTS Dr. Rivers for CABG eval. Patient denies any current SOB or chest pain on admission. Otherwise denies headaches, abdominal pain, urinary or bowel changes, fevers, chills, N/V/D, sick contacts.  (24 Dec 2024 05:07)      PAST MEDICAL & SURGICAL HISTORY:  Type 2 diabetes mellitus      Hypertension      End stage renal disease  started HD 2/2019 T, Th, Sat via right chest permacath      Bone spur  right shoulder- hx of - sx done      Anemia      Injury of right wrist  hx of at age 14      History of hyperkalemia  before HD- K-6.2 - to repeat in am of sx, pt had HD today      S/P arthroscopy of right shoulder  2010      History of vascular access device  right chest permacath - 2/2019      H/O right wrist surgery  tendons repair - at age 14      AV fistula      H/O ventral hernia repair      S/P cholecystectomy          PREVIOUS DIAGNOSTIC TESTING:      ECHO  FINDINGS:    STRESS  FINDINGS:    CATHETERIZATION  FINDINGS:    MEDICATIONS  (STANDING):  amLODIPine   Tablet 5 milliGRAM(s) Oral daily  aspirin enteric coated 81 milliGRAM(s) Oral daily  atorvastatin 80 milliGRAM(s) Oral at bedtime  calcium acetate 667 milliGRAM(s) Oral three times a day with meals  carvedilol 12.5 milliGRAM(s) Oral every 12 hours  enoxaparin Injectable 30 milliGRAM(s) SubCutaneous every 24 hours  epoetin ignacia (EPOGEN) Injectable 02245 Unit(s) IV Push <User Schedule>  insulin lispro (ADMELOG) corrective regimen sliding scale   SubCutaneous three times a day before meals  insulin lispro (ADMELOG) corrective regimen sliding scale   SubCutaneous at bedtime  pantoprazole    Tablet 40 milliGRAM(s) Oral before breakfast  sodium chloride 0.9% lock flush 3 milliLiter(s) IV Push every 8 hours  traZODone 100 milliGRAM(s) Oral at bedtime    MEDICATIONS  (PRN):      FAMILY HISTORY:  FH: hypertension  mother, father- alive    FH: type 2 diabetes  mother, father- alive        SOCIAL HISTORY:    CIGARETTES:    ALCOHOL:    REVIEW OF SYSTEMS:  CONSTITUTIONAL: No fever, weight loss, or fatigue  EYES: No eye pain, visual disturbances, or discharge  ENMT:  No difficulty hearing, tinnitus, vertigo; No sinus or throat pain  NECK: No pain or stiffness  RESPIRATORY: No cough, wheezing, chills or hemoptysis; No shortness of breath  CARDIOVASCULAR: No chest pain, palpitations, dizziness, or leg swelling  GASTROINTESTINAL: No abdominal or epigastric pain. No nausea, vomiting, or hematemesis; No diarrhea or constipation. No melena or hematochezia.  GENITOURINARY: No dysuria, frequency, hematuria, or incontinence  NEUROLOGICAL: No headaches, memory loss, loss of strength, numbness, or tremors  SKIN: No itching, burning, rashes, or lesions   LYMPH NODES: No enlarged glands  ENDOCRINE: No heat or cold intolerance; No hair loss  MUSCULOSKELETAL: No joint pain or swelling; No muscle, back, or extremity pain  PSYCHIATRIC: No depression, anxiety, mood swings, or difficulty sleeping  HEME/LYMPH: No easy bruising, or bleeding gums  ALLERY AND IMMUNOLOGIC: No hives or eczema    Vital Signs Last 24 Hrs  T(C): 36.8 (25 Dec 2024 20:05), Max: 37 (25 Dec 2024 04:55)  T(F): 98.2 (25 Dec 2024 20:05), Max: 98.6 (25 Dec 2024 04:55)  HR: 71 (25 Dec 2024 20:05) (66 - 77)  BP: 159/83 (25 Dec 2024 20:05) (153/72 - 177/69)  BP(mean): 108 (25 Dec 2024 20:05) (99 - 111)  RR: 18 (25 Dec 2024 20:05) (18 - 19)  SpO2: 93% (25 Dec 2024 20:05) (93% - 96%)    Parameters below as of 25 Dec 2024 20:05  Patient On (Oxygen Delivery Method): room air          PHYSICAL EXAM:  GENERAL: NAD, well-groomed, well-developed  HEAD:  Atraumatic, Normocephalic  EYES: EOMI, PERRLA, conjunctiva and sclera clear  ENMT: No tonsillar erythema, exudates, or enlargement; Moist mucous membranes, Good dentition, No lesions  NECK: Supple, No JVD, Normal thyroid  NERVOUS SYSTEM:  Alert & Oriented X3, Good concentration; Motor Strength 5/5 B/L upper and lower extremities; DTRs 2+ intact and symmetric  CHEST/LUNG: Clear to percussion bilaterally; No rales, rhonchi, wheezing, or rubs  HEART: Regular rate and rhythm; No murmurs, rubs, or gallops  ABDOMEN: Soft, Nontender, Nondistended; Bowel sounds present  EXTREMITIES:  2+ Peripheral Pulses, No clubbing, cyanosis, or edema  LYMPH: No lymphadenopathy noted  SKIN: No rashes or lesions      INTERPRETATION OF TELEMETRY:    ECG:    [facility]:     LABS:                        8.1    7.27  )-----------( 215      ( 25 Dec 2024 10:39 )             25.9     12-25    140  |  101  |  53[H]  ----------------------------<  109[H]  5.2   |  26  |  6.47[H]    Ca    8.9      25 Dec 2024 10:39  Phos  4.2     12-25  Mg     1.9     12-25    TPro  7.3  /  Alb  3.5  /  TBili  0.3  /  DBili  x   /  AST  14  /  ALT  9[L]  /  AlkPhos  112  12-25        PT/INR - ( 24 Dec 2024 06:50 )   PT: 13.7 sec;   INR: 1.19 ratio         PTT - ( 24 Dec 2024 06:50 )  PTT:39.7 sec  Urinalysis Basic - ( 25 Dec 2024 10:39 )    Color: x / Appearance: x / SG: x / pH: x  Gluc: 109 mg/dL / Ketone: x  / Bili: x / Urobili: x   Blood: x / Protein: x / Nitrite: x   Leuk Esterase: x / RBC: x / WBC x   Sq Epi: x / Non Sq Epi: x / Bacteria: x      Lipid Panel:   I&O's Summary    24 Dec 2024 07:01  -  25 Dec 2024 07:00  --------------------------------------------------------  IN: 211 mL / OUT: 1000 mL / NET: -789 mL    25 Dec 2024 07:01  -  25 Dec 2024 21:04  --------------------------------------------------------  IN: 620 mL / OUT: 0 mL / NET: 620 mL        RADIOLOGY & ADDITIONAL STUDIES:    < from: TTE W or WO Ultrasound Enhancing Agent (12.24.24 @ 14:25) >  CONCLUSIONS:      1. Left ventricular cavity is severely dilated. Left ventricular wall thickness is normal. Left ventricular systolic function is moderately to severely decreased with an ejection fraction of 35 % by Michaels's method of disks. Global left ventricular hypokinesis.   2. Elevated left ventricular filling pressure.   3. Severe tricuspid regurgitation.   4. Moderate-to-severe pulmonary hypertension.    < end of copied text >

## 2024-12-25 NOTE — CONSULT NOTE ADULT - SUBJECTIVE AND OBJECTIVE BOX
Patient is a 54y old  Male who presents with a chief complaint of CAD (25 Dec 2024 13:23)      HPI:  54M with PMH of HTN, HLD, ESRD on HD MWF via LUE AVF, ascites (requiring repeat paracenteses q4-6wks), NICM with moderate LV dysfunction w/ EF 35-40%(2023) and CAD s/p atherectomy + SALLIE to D1(4/11/2024) presents to [facility] transferred from [facility]. [facility]. Of note he was recently admitted from 09/27-12/02 for acute cholecystitis s/p cholecystectomy. [facility], pt was hypoxic to 86% on RA, trop 1.7K, EKG no new changes, CXR fluid overload. Admitted for AHRF 2/2 fluid overload. Pt received HD on 12/18, 12/19, 12/20 and 12/22. DAPT was resumed, TTE showed improvement in LVEF to 40-45%. Stress test was abnormal with 1mm inferior STD, reversible ischemia in the inferior wall and fixed defects in the lateral, anterior and apical walls with post stress EF of 24%. Pt was then transferred to [facility] for cardiac cath which showed pLAD 70% ISR, mLCx 70%, D1 severe dz. Patient now transferred to [facility] CTS Dr. Rivers for CABG eval. Patient denies any current SOB or chest pain on admission. Otherwise denies headaches, abdominal pain, urinary or bowel changes, fevers, chills, N/V/D, sick contacts.  (24 Dec 2024 05:07)      PAST MEDICAL & SURGICAL HISTORY:  Type 2 diabetes mellitus      Hypertension      End stage renal disease  started HD 2/2019 T, Th, Sat via right chest permacath      Bone spur  right shoulder- hx of - sx done      Anemia      Injury of right wrist  hx of at age 14      History of hyperkalemia  before HD- K-6.2 - to repeat in am of sx, pt had HD today      S/P arthroscopy of right shoulder  2010      History of vascular access device  right chest permacath - 2/2019      H/O right wrist surgery  tendons repair - at age 14      AV fistula      H/O ventral hernia repair      S/P cholecystectomy          Review of Systems:   CONSTITUTIONAL: No fever,  or fatigue  NECK: No pain or stiffness  RESPIRATORY: No cough,  No shortness of breath  CARDIOVASCULAR: No chest pain, palpitations, dizziness, or leg swelling  GASTROINTESTINAL: No abdominal  pain. No nausea, vomiting.  NEUROLOGICAL: No headaches,           Allergies    No Known Allergies    Intolerances        Social History:     FAMILY HISTORY:  FH: hypertension  mother, father- alive    FH: type 2 diabetes  mother, father- alive        MEDICATIONS  (STANDING):  amLODIPine   Tablet 10 milliGRAM(s) Oral at bedtime  aspirin enteric coated 81 milliGRAM(s) Oral daily  atorvastatin 80 milliGRAM(s) Oral at bedtime  calcium acetate 667 milliGRAM(s) Oral three times a day with meals  carvedilol 12.5 milliGRAM(s) Oral every 12 hours  enoxaparin Injectable 30 milliGRAM(s) SubCutaneous every 24 hours  epoetin ignacia (EPOGEN) Injectable 28270 Unit(s) IV Push <User Schedule>  insulin lispro (ADMELOG) corrective regimen sliding scale   SubCutaneous three times a day before meals  insulin lispro (ADMELOG) corrective regimen sliding scale   SubCutaneous at bedtime  pantoprazole    Tablet 40 milliGRAM(s) Oral before breakfast  sodium chloride 0.9% lock flush 3 milliLiter(s) IV Push every 8 hours  traZODone 100 milliGRAM(s) Oral at bedtime    MEDICATIONS  (PRN):      CAPILLARY BLOOD GLUCOSE      POCT Blood Glucose.: 159 mg/dL (25 Dec 2024 21:43)  POCT Blood Glucose.: 157 mg/dL (25 Dec 2024 16:32)  POCT Blood Glucose.: 143 mg/dL (25 Dec 2024 11:28)  POCT Blood Glucose.: 114 mg/dL (25 Dec 2024 07:46)    I&O's Summary    24 Dec 2024 07:01  -  25 Dec 2024 07:00  --------------------------------------------------------  IN: 211 mL / OUT: 1000 mL / NET: -789 mL    25 Dec 2024 07:01  -  25 Dec 2024 22:09  --------------------------------------------------------  IN: 620 mL / OUT: 0 mL / NET: 620 mL        T(C): 36.8 (12-25-24 @ 20:05), Max: 37 (12-25-24 @ 04:55)  HR: 66 (12-25-24 @ 21:52) (66 - 77)  BP: 165/85 (12-25-24 @ 21:52) (153/72 - 177/69)  RR: 18 (12-25-24 @ 21:52) (18 - 19)  SpO2: 96% (12-25-24 @ 21:52) (93% - 96%)    PHYSICAL EXAM:    GENERAL: NAD  NECK: Supple, No JVD  CHEST/LUNG: Clear to auscultation bilaterally; No wheezing.  HEART: Regular rate and rhythm; No murmurs, rubs, or gallops  ABDOMEN: Soft, Nontender, Nondistended; Bowel sounds present  EXTREMITIES:  2+ Peripheral Pulses, No edema  NEUROLOGY: AAOx 3      LABS:                        8.1    7.27  )-----------( 215      ( 25 Dec 2024 10:39 )             25.9     12-25    140  |  101  |  53[H]  ----------------------------<  109[H]  5.2   |  26  |  6.47[H]    Ca    8.9      25 Dec 2024 10:39  Phos  4.2     12-25  Mg     1.9     12-25    TPro  7.3  /  Alb  3.5  /  TBili  0.3  /  DBili  x   /  AST  14  /  ALT  9[L]  /  AlkPhos  112  12-25    PT/INR - ( 24 Dec 2024 06:50 )   PT: 13.7 sec;   INR: 1.19 ratio         PTT - ( 24 Dec 2024 06:50 )  PTT:39.7 sec      Urinalysis Basic - ( 25 Dec 2024 10:39 )    Color: x / Appearance: x / SG: x / pH: x  Gluc: 109 mg/dL / Ketone: x  / Bili: x / Urobili: x   Blood: x / Protein: x / Nitrite: x   Leuk Esterase: x / RBC: x / WBC x   Sq Epi: x / Non Sq Epi: x / Bacteria: x      CAPILLARY BLOOD GLUCOSE      POCT Blood Glucose.: 159 mg/dL (25 Dec 2024 21:43)  POCT Blood Glucose.: 157 mg/dL (25 Dec 2024 16:32)  POCT Blood Glucose.: 143 mg/dL (25 Dec 2024 11:28)  POCT Blood Glucose.: 114 mg/dL (25 Dec 2024 07:46)        RADIOLOGY & ADDITIONAL TESTS:    Imaging Personally Reviewed:    Consultant(s) Notes Reviewed:      Care Discussed with Consultants/Other Providers:    Thanks for consult. Will follow.

## 2024-12-26 LAB
ALBUMIN SERPL ELPH-MCNC: 3.5 G/DL — SIGNIFICANT CHANGE UP (ref 3.3–5)
ALP SERPL-CCNC: 109 U/L — SIGNIFICANT CHANGE UP (ref 40–120)
ALT FLD-CCNC: 8 U/L — LOW (ref 10–45)
ANION GAP SERPL CALC-SCNC: 15 MMOL/L — SIGNIFICANT CHANGE UP (ref 5–17)
AST SERPL-CCNC: 10 U/L — SIGNIFICANT CHANGE UP (ref 10–40)
BILIRUB SERPL-MCNC: 0.4 MG/DL — SIGNIFICANT CHANGE UP (ref 0.2–1.2)
BUN SERPL-MCNC: 65 MG/DL — HIGH (ref 7–23)
CALCIUM SERPL-MCNC: 9 MG/DL — SIGNIFICANT CHANGE UP (ref 8.4–10.5)
CHLORIDE SERPL-SCNC: 99 MMOL/L — SIGNIFICANT CHANGE UP (ref 96–108)
CO2 SERPL-SCNC: 23 MMOL/L — SIGNIFICANT CHANGE UP (ref 22–31)
CREAT SERPL-MCNC: 7.88 MG/DL — HIGH (ref 0.5–1.3)
EGFR: 7 ML/MIN/1.73M2 — LOW
EGFR: 7 ML/MIN/1.73M2 — LOW
GLUCOSE SERPL-MCNC: 113 MG/DL — HIGH (ref 70–99)
HCT VFR BLD CALC: 26.6 % — LOW (ref 39–50)
HGB BLD-MCNC: 8.3 G/DL — LOW (ref 13–17)
MAGNESIUM SERPL-MCNC: 1.9 MG/DL — SIGNIFICANT CHANGE UP (ref 1.6–2.6)
MCHC RBC-ENTMCNC: 31.2 G/DL — LOW (ref 32–36)
MCHC RBC-ENTMCNC: 31.2 PG — SIGNIFICANT CHANGE UP (ref 27–34)
MCV RBC AUTO: 100 FL — SIGNIFICANT CHANGE UP (ref 80–100)
NRBC # BLD: 0 /100 WBCS — SIGNIFICANT CHANGE UP (ref 0–0)
NRBC BLD-RTO: 0 /100 WBCS — SIGNIFICANT CHANGE UP (ref 0–0)
PHOSPHATE SERPL-MCNC: 4.6 MG/DL — HIGH (ref 2.5–4.5)
PLATELET # BLD AUTO: 203 K/UL — SIGNIFICANT CHANGE UP (ref 150–400)
POTASSIUM SERPL-MCNC: 5.6 MMOL/L — HIGH (ref 3.5–5.3)
POTASSIUM SERPL-SCNC: 5.6 MMOL/L — HIGH (ref 3.5–5.3)
PROT SERPL-MCNC: 7.1 G/DL — SIGNIFICANT CHANGE UP (ref 6–8.3)
RBC # BLD: 2.66 M/UL — LOW (ref 4.2–5.8)
RBC # FLD: 16 % — HIGH (ref 10.3–14.5)
SODIUM SERPL-SCNC: 137 MMOL/L — SIGNIFICANT CHANGE UP (ref 135–145)
WBC # BLD: 6.53 K/UL — SIGNIFICANT CHANGE UP (ref 3.8–10.5)
WBC # FLD AUTO: 6.53 K/UL — SIGNIFICANT CHANGE UP (ref 3.8–10.5)

## 2024-12-26 PROCEDURE — 99232 SBSQ HOSP IP/OBS MODERATE 35: CPT

## 2024-12-26 RX ORDER — MUPIROCIN CALCIUM 20 MG/G
1 CREAM TOPICAL
Refills: 0 | Status: DISCONTINUED | OUTPATIENT
Start: 2024-12-26 | End: 2024-12-29

## 2024-12-26 RX ADMIN — Medication 667 MILLIGRAM(S): at 17:31

## 2024-12-26 RX ADMIN — Medication 3 MILLILITER(S): at 04:39

## 2024-12-26 RX ADMIN — ATORVASTATIN CALCIUM 80 MILLIGRAM(S): 80 TABLET, FILM COATED ORAL at 21:15

## 2024-12-26 RX ADMIN — Medication 667 MILLIGRAM(S): at 08:01

## 2024-12-26 RX ADMIN — Medication 40 MILLIGRAM(S): at 05:57

## 2024-12-26 RX ADMIN — Medication 3 MILLILITER(S): at 21:03

## 2024-12-26 RX ADMIN — Medication 81 MILLIGRAM(S): at 11:50

## 2024-12-26 RX ADMIN — Medication 667 MILLIGRAM(S): at 11:50

## 2024-12-26 RX ADMIN — ENOXAPARIN SODIUM 30 MILLIGRAM(S): 100 INJECTION SUBCUTANEOUS at 11:51

## 2024-12-26 RX ADMIN — CARVEDILOL 12.5 MILLIGRAM(S): 3.12 TABLET, FILM COATED ORAL at 05:57

## 2024-12-26 RX ADMIN — Medication 100 MILLIGRAM(S): at 21:15

## 2024-12-26 RX ADMIN — AMLODIPINE BESYLATE 10 MILLIGRAM(S): 10 TABLET ORAL at 19:28

## 2024-12-26 RX ADMIN — CARVEDILOL 12.5 MILLIGRAM(S): 3.12 TABLET, FILM COATED ORAL at 17:31

## 2024-12-26 RX ADMIN — MUPIROCIN CALCIUM 1 APPLICATION(S): 20 CREAM TOPICAL at 17:31

## 2024-12-26 RX ADMIN — Medication 3 MILLILITER(S): at 13:03

## 2024-12-26 RX ADMIN — MUPIROCIN CALCIUM 1 APPLICATION(S): 20 CREAM TOPICAL at 08:53

## 2024-12-26 NOTE — PROGRESS NOTE ADULT - SUBJECTIVE AND OBJECTIVE BOX
VITAL SIGNS    Telemetry:  SR 60  Vital Signs Last 24 Hrs  T(C): 36.9 (24 @ 11:00), Max: 36.9 (24 @ 05:29)  T(F): 98.4 (24 @ 11:00), Max: 98.4 (24 @ 05:29)  HR: 67 (24 @ 05:29) (66 - 73)  BP: 144/82 (24 @ 11:00) (144/82 - 169/83)  RR: 18 (24 @ 11:00) (18 - 18)  SpO2: 96% (24 @ 11:00) (91% - 96%)             @ 07:01  -   @ 07:00  --------------------------------------------------------  IN: 720 mL / OUT: 0 mL / NET: 720 mL     @ 07:01  -   @ 12:28  --------------------------------------------------------  IN: 300 mL / OUT: 0 mL / NET: 300 mL       Daily     Daily Weight in k.9 (26 Dec 2024 07:30)  Admit Wt: Drug Dosing Weight  Height (cm): 180.3 (24 Dec 2024 04:16)  Weight (kg): 83.6 (24 Dec 2024 04:16)  BMI (kg/m2): 25.7 (24 Dec 2024 04:16)  BSA (m2): 2.04 (24 Dec 2024 04:16)    Bilirubin Total: 0.4 mg/dL ( @ 08:37)    CAPILLARY BLOOD GLUCOSE      POCT Blood Glucose.: 126 mg/dL (26 Dec 2024 11:33)  POCT Blood Glucose.: 110 mg/dL (26 Dec 2024 07:45)  POCT Blood Glucose.: 159 mg/dL (25 Dec 2024 21:43)  POCT Blood Glucose.: 157 mg/dL (25 Dec 2024 16:32)          MEDICATIONS  amLODIPine   Tablet 10 milliGRAM(s) Oral at bedtime  aspirin enteric coated 81 milliGRAM(s) Oral daily  atorvastatin 80 milliGRAM(s) Oral at bedtime  calcium acetate 667 milliGRAM(s) Oral three times a day with meals  carvedilol 12.5 milliGRAM(s) Oral every 12 hours  enoxaparin Injectable 30 milliGRAM(s) SubCutaneous every 24 hours  epoetin ignacia (EPOGEN) Injectable 47984 Unit(s) IV Push <User Schedule>  insulin lispro (ADMELOG) corrective regimen sliding scale   SubCutaneous three times a day before meals  insulin lispro (ADMELOG) corrective regimen sliding scale   SubCutaneous at bedtime  mupirocin 2% Nasal 1 Application(s) Both Nostrils two times a day  pantoprazole    Tablet 40 milliGRAM(s) Oral before breakfast  sodium chloride 0.9% lock flush 3 milliLiter(s) IV Push every 8 hours  traZODone 100 milliGRAM(s) Oral at bedtime      >>> <<<  PHYSICAL EXAM  Subjective: NAD  Neurology: alert and oriented x 3, nonfocal, no gross deficits  CV :s1s2  Lungs:CTA  Abdomen: soft, tender abd,ND, ( +)BM  :  voiding  Extremities: -c/c/e      LABS      137  |  99  |  65[H]  ----------------------------<  113[H]  5.6[H]   |  23  |  7.88[H]    Ca    9.0      26 Dec 2024 08:37  Phos  4.6       Mg     1.9         TPro  7.1  /  Alb  3.5  /  TBili  0.4  /  DBili  x   /  AST  10  /  ALT  8[L]  /  AlkPhos  109                                   8.3    6.53  )-----------( 203      ( 26 Dec 2024 08:37 )             26.6                 PAST MEDICAL & SURGICAL HISTORY:  Type 2 diabetes mellitus      Hypertension      End stage renal disease  started HD 2019 T, Th, Sat via right chest permacath      Bone spur  right shoulder- hx of - sx done      Anemia      Injury of right wrist  hx of at age 14      History of hyperkalemia  before HD- K-6.2 - to repeat in am of sx, pt had HD today      S/P arthroscopy of right shoulder        History of vascular access device  right chest permacath - 2019      H/O right wrist surgery  tendons repair - at age 14      AV fistula      H/O ventral hernia repair      S/P cholecystectomy

## 2024-12-26 NOTE — PROGRESS NOTE ADULT - SUBJECTIVE AND OBJECTIVE BOX
Patient is a 54y old  Male who presents with a chief complaint of SR 60-80 (26 Dec 2024 12:28)      SUBJECTIVE / OVERNIGHT EVENTS:    Events noted.  CONSTITUTIONAL: No fever,  or fatigue  RESPIRATORY: No cough, wheezing,  No shortness of breath  CARDIOVASCULAR: No chest pain, palpitations, dizziness, or leg swelling  GASTROINTESTINAL: No abdominal or epigastric pain. No nausea, vomiting.  NEUROLOGICAL: No headache    MEDICATIONS  (STANDING):  amLODIPine   Tablet 10 milliGRAM(s) Oral at bedtime  aspirin enteric coated 81 milliGRAM(s) Oral daily  atorvastatin 80 milliGRAM(s) Oral at bedtime  calcium acetate 667 milliGRAM(s) Oral three times a day with meals  carvedilol 12.5 milliGRAM(s) Oral every 12 hours  enoxaparin Injectable 30 milliGRAM(s) SubCutaneous every 24 hours  epoetin ignacia (EPOGEN) Injectable 98281 Unit(s) IV Push <User Schedule>  insulin lispro (ADMELOG) corrective regimen sliding scale   SubCutaneous three times a day before meals  insulin lispro (ADMELOG) corrective regimen sliding scale   SubCutaneous at bedtime  mupirocin 2% Nasal 1 Application(s) Both Nostrils two times a day  pantoprazole    Tablet 40 milliGRAM(s) Oral before breakfast  sodium chloride 0.9% lock flush 3 milliLiter(s) IV Push every 8 hours  traZODone 100 milliGRAM(s) Oral at bedtime    MEDICATIONS  (PRN):        CAPILLARY BLOOD GLUCOSE      POCT Blood Glucose.: 183 mg/dL (26 Dec 2024 21:28)  POCT Blood Glucose.: 139 mg/dL (26 Dec 2024 16:20)  POCT Blood Glucose.: 126 mg/dL (26 Dec 2024 11:33)  POCT Blood Glucose.: 110 mg/dL (26 Dec 2024 07:45)    I&O's Summary    25 Dec 2024 07:01  -  26 Dec 2024 07:00  --------------------------------------------------------  IN: 720 mL / OUT: 0 mL / NET: 720 mL    26 Dec 2024 07:01  -  26 Dec 2024 21:59  --------------------------------------------------------  IN: 800 mL / OUT: 0 mL / NET: 800 mL        T(C): 36.7 (12-26-24 @ 19:15), Max: 36.9 (12-26-24 @ 05:29)  HR: 64 (12-26-24 @ 19:15) (64 - 67)  BP: 166/77 (12-26-24 @ 19:15) (144/82 - 172/85)  RR: 18 (12-26-24 @ 19:15) (18 - 18)  SpO2: 95% (12-26-24 @ 19:15) (91% - 96%)    PHYSICAL EXAM:    NECK: Supple, No JVD  CHEST/LUNG: Clear to auscultation bilaterally; No wheezing.  HEART: Regular rate and rhythm; No murmurs, rubs, or gallops  ABDOMEN: Soft, Nontender, Nondistended; Bowel sounds present  EXTREMITIES:   No edema  NEUROLOGY: AAO X 3      LABS:                        8.3    6.53  )-----------( 203      ( 26 Dec 2024 08:37 )             26.6     12-26    137  |  99  |  65[H]  ----------------------------<  113[H]  5.6[H]   |  23  |  7.88[H]    Ca    9.0      26 Dec 2024 08:37  Phos  4.6     12-26  Mg     1.9     12-26    TPro  7.1  /  Alb  3.5  /  TBili  0.4  /  DBili  x   /  AST  10  /  ALT  8[L]  /  AlkPhos  109  12-26          Urinalysis Basic - ( 26 Dec 2024 08:37 )    Color: x / Appearance: x / SG: x / pH: x  Gluc: 113 mg/dL / Ketone: x  / Bili: x / Urobili: x   Blood: x / Protein: x / Nitrite: x   Leuk Esterase: x / RBC: x / WBC x   Sq Epi: x / Non Sq Epi: x / Bacteria: x      CAPILLARY BLOOD GLUCOSE      POCT Blood Glucose.: 183 mg/dL (26 Dec 2024 21:28)  POCT Blood Glucose.: 139 mg/dL (26 Dec 2024 16:20)  POCT Blood Glucose.: 126 mg/dL (26 Dec 2024 11:33)  POCT Blood Glucose.: 110 mg/dL (26 Dec 2024 07:45)        RADIOLOGY & ADDITIONAL TESTS:    Imaging Personally Reviewed:    Consultant(s) Notes Reviewed:      Care Discussed with Consultants/Other Providers:    Donaldo Ferro MD, CMD, FACP    [id number] [place] [id number]  Office Tel: [phone]  Cell: [phone]   0 = swallows foods/liquids without difficulty

## 2024-12-26 NOTE — PROGRESS NOTE ADULT - ASSESSMENT
54M with PMH of HTN, HLD, ESRD on HD MWF via LUE AVF, ascites (requiring repeat paracenteses q4-6wks), NICM with moderate LV dysfunction w/ EF 35-40%(2023) and CAD s/p atherectomy + SALLIE to D1(4/11/2024) presents to [facility] transferred from [facility]. Patient initially presented to [facility] ED with shortness of breath. Of note he was recently admitted from 09/27-12/02 for acute cholecystitis s/p cholecystectomy. In [facility] ED, pt was hypoxic to 86% on RA, trop 1.7K, EKG no new changes, CXR fluid overload. Admitted for AHRF 2/2 fluid overload. Pt received HD on 12/18, 12/19, 12/20 and 12/22. DAPT was resumed, TTE showed improvement in LVEF to 40-45%. Stress test was abnormal with 1mm inferior STD, reversible ischemia in the inferior wall and fixed defects in the lateral, anterior and apical walls with post stress EF of 24%. Pt was then transferred to [facility] for cardiac cath which showed pLAD 70% ISR, mLCx 70%, D1 severe dz. Patient now transferred to [facility] CTS Dr. Rivers for CABG eval. Patient denies any current SOB or chest pain on admission. Otherwise denies headaches, abdominal pain, urinary or bowel changes, fevers, chills, N/V/D, sick contacts.  12/ 25 VSS  CP free   HD via avf  for daily HD pre op- on lovenox 30 qd

## 2024-12-26 NOTE — PROGRESS NOTE ADULT - SUBJECTIVE AND OBJECTIVE BOX
[facility]    Dr. Nica Styles     Office [phone] (9 am to 5 pm)  Service: [phone] (5pm to 9am)  Also Available on TEAMS      RENAL PROGRESS NOTE: DATE OF SERVICE 12-26-24 @ 12:37    Patient is a 54y old  Male who presents with a chief complaint of SR 60-80 (26 Dec 2024 12:28)      Patient seen and examined at bedside. No chest pain/sob    VITALS:  T(F): 98.4 (12-26-24 @ 11:00), Max: 98.4 (12-26-24 @ 05:29)  HR: 67 (12-26-24 @ 05:29)  BP: 144/82 (12-26-24 @ 11:00)  RR: 18 (12-26-24 @ 11:00)  SpO2: 96% (12-26-24 @ 11:00)  Wt(kg): --    12-25 @ 07:01  -  12-26 @ 07:00  --------------------------------------------------------  IN: 720 mL / OUT: 0 mL / NET: 720 mL    12-26 @ 07:01  -  12-26 @ 12:37  --------------------------------------------------------  IN: 300 mL / OUT: 0 mL / NET: 300 mL          PHYSICAL EXAM:  Constitutional: NAD  Neck: No JVD  Respiratory: CTAB, no wheezes, rales or rhonchi  Cardiovascular: S1, S2, RRR  Gastrointestinal: BS+, soft, NT/ND  Extremities: No peripheral edema    Hospital Medications:   MEDICATIONS  (STANDING):  amLODIPine   Tablet 10 milliGRAM(s) Oral at bedtime  aspirin enteric coated 81 milliGRAM(s) Oral daily  atorvastatin 80 milliGRAM(s) Oral at bedtime  calcium acetate 667 milliGRAM(s) Oral three times a day with meals  carvedilol 12.5 milliGRAM(s) Oral every 12 hours  enoxaparin Injectable 30 milliGRAM(s) SubCutaneous every 24 hours  epoetin ignacia (EPOGEN) Injectable 39847 Unit(s) IV Push <User Schedule>  insulin lispro (ADMELOG) corrective regimen sliding scale   SubCutaneous three times a day before meals  insulin lispro (ADMELOG) corrective regimen sliding scale   SubCutaneous at bedtime  mupirocin 2% Nasal 1 Application(s) Both Nostrils two times a day  pantoprazole    Tablet 40 milliGRAM(s) Oral before breakfast  sodium chloride 0.9% lock flush 3 milliLiter(s) IV Push every 8 hours  traZODone 100 milliGRAM(s) Oral at bedtime      LABS:  12-26    137  |  99  |  65[H]  ----------------------------<  113[H]  5.6[H]   |  23  |  7.88[H]    Ca    9.0      26 Dec 2024 08:37  Phos  4.6     12-26  Mg     1.9     12-26    TPro  7.1  /  Alb  3.5  /  TBili  0.4  /  DBili      /  AST  10  /  ALT  8[L]  /  AlkPhos  109  12-26    Creatinine Trend: 7.88 <--, 6.47 <--, 7.65 <--, 7.48 <--, 6.44 <--    Albumin: 3.5 g/dL (12-26 @ 08:37)  Phosphorus: 4.6 mg/dL (12-26 @ 08:37)                              8.3    6.53  )-----------( 203      ( 26 Dec 2024 08:37 )             26.6     Urine Studies:  Urinalysis - [12-26-24 @ 08:37]      Color  / Appearance  / SG  / pH       Gluc 113 / Ketone   / Bili  / Urobili        Blood  / Protein  / Leuk Est  / Nitrite       RBC  / WBC  / Hyaline  / Gran  / Sq Epi  / Non Sq Epi  / Bacteria       Iron 29, TIBC 143, %sat 20      [12-19-24 @ 05:00]  Ferritin 904      [12-19-24 @ 05:00]  TSH 4.75      [12-24-24 @ 06:50]    HBsAg Nonreact      [12-19-24 @ 05:00]      RADIOLOGY & ADDITIONAL STUDIES:

## 2024-12-26 NOTE — PROGRESS NOTE ADULT - ASSESSMENT
54M with PMH of HTN, HLD, ESRD on HD MWF via LUE AVF, ascites (requiring repeat paracenteses q4-6wks), NICM with moderate LV dysfunction w/ EF 35-40%(2023) and CAD s/p atherectomy + SALLIE to D1(4/11/2024) presents to [facility] transferred from [facility]. Patient initially presented to [facility] ED with shortness of breath. Of note he was recently admitted from 09/27-12/02 for acute cholecystitis s/p cholecystectomy. In [facility] ED, pt was hypoxic to 86% on RA, trop 1.7K, EKG no new changes, CXR fluid overload. Admitted for AHRF 2/2 fluid overload. Pt received HD on 12/18, 12/19, 12/20 and 12/22. DAPT was resumed, TTE showed improvement in LVEF to 40-45%. Stress test was abnormal with 1mm inferior STD, reversible ischemia in the inferior wall and fixed defects in the lateral, anterior and apical walls with post stress EF of 24%. Pt was then transferred to [facility] for cardiac cath which showed pLAD 70% ISR, mLCx 70%, D1 severe dz. Patient now transferred to [facility] CTS Dr. Rivers for CABG eval.     CAD, multiple vessel:    CTS/Cardio eval appreciated.  For CABG  ASA/Coreg/Atorvastatin    ESRD:    Nephro f/up appreciated  On HD  On Epogen

## 2024-12-26 NOTE — PROGRESS NOTE ADULT - ASSESSMENT
54M with PMH of HTN, HLD, ESRD on HD MWF via LUE AVF, ascites (requiring repeat paracenteses q4-6wks), NICM with moderate LV dysfunction w/ EF 35-40%(2023) and CAD s/p atherectomy + SALLIE to D1(4/11/2024) presents to [facility] transferred from [facility] for CABG eval  [place] for HD    ESRD on HD   Regular scheduele MWF   Access LUE AVF  Last HD on 12/24  Will arrange for HD tomm  Keep MAP>65  Renal Diet  Consent in physical chart    Hyperkalemia  Can give Lokelma 10 grams x1 today    HTN  BP fluctuating   cw home meds    CKD MBD  Can send a PTH  Ca and Phos daily    Anemia  CRISTIANE with HD  Trnasfuse if hgb <7    CAD with multivessel disease  Planned for CABG

## 2024-12-26 NOTE — PROGRESS NOTE ADULT - ASSESSMENT
54M with PMH of HTN, HLD, ESRD on HD MWF via LUE AVF, ascites (requiring repeat paracenteses q4-6wks), NICM with moderate LV dysfunction w/ EF 35-40%(2023) and CAD s/p atherectomy + SALLIE to D1(4/11/2024) presents to [facility] transferred from [facility]. Patient initially presented to [facility] ED with shortness of breath. Of note he was recently admitted from 09/27-12/02 for acute cholecystitis s/p cholecystectomy. In [facility] ED, pt was hypoxic to 86% on RA, trop 1.7K, EKG no new changes, CXR fluid overload. Admitted for AHRF 2/2 fluid overload. Pt received HD on 12/18, 12/19, 12/20 and 12/22. DAPT was resumed, TTE showed improvement in LVEF to 40-45%. Stress test was abnormal with 1mm inferior STD, reversible ischemia in the inferior wall and fixed defects in the lateral, anterior and apical walls with post stress EF of 24%. Pt was then transferred to [facility] for cardiac cath which showed pLAD 70% ISR, mLCx 70%, D1 severe dz. Patient now transferred to [facility] CTS Dr. Rivers for CABG eval. Patient denies any current SOB or chest pain on admission. Otherwise denies headaches, abdominal pain, urinary or bowel changes, fevers, chills, N/V/D, sick contacts.  12/ 25 VSS  CP free   HD via avf  for daily HD pre op- on lovenox 30 qd  54M with PMH of HTN, HLD, ESRD on HD MWF via LUE AVF, ascites (requiring repeat paracenteses q4-6wks), NICM with moderate LV dysfunction w/ EF 35-40%(2023) and CAD s/p atherectomy + SALLIE to D1(4/11/2024) presents to [facility] transferred from [facility]. Patient [facility] with [facility]ness of breath. Of note he was recently admitted from 09/27-12/02 for acute cholecystitis s/p cholecystectomy. In [facility] ED, pt was hypoxic to 86% on RA, trop 1.7K, EKG no new changes, CXR fluid overload. Admitted for AHRF 2/2 fluid overload. Pt received HD on 12/18, 12/19, 12/20 and 12/22. DAPT was resumed, TTE showed improvement in LVEF to 40-45%. Stress test was abnormal with 1mm inferior STD, reversible ischemia in the inferior wall and fixed defects in the lateral, anterior and apical walls with post stress EF of 24%. Pt was then transferred to [facility] for cardiac cath which showed pLAD 70% ISR, mLCx 70%, D1 severe dz. Patient now transferred to [facility] CTS Dr. Rivers for CABG eval. Patient denies any current SOB or chest pain on admission. Otherwise denies headaches, abdominal pain, urinary or bowel changes, fevers, chills, N/V/D, sick contacts.  12/ 25 VSS  CP free   HD via avf  for daily HD pre op- on lovenox 30 qd   12/26 HD today continue with present meds. Plan for CABG possibleTVR next week

## 2024-12-26 NOTE — PROGRESS NOTE ADULT - SUBJECTIVE AND OBJECTIVE BOX
PATIENT SEEN AND EXAMINED BY JEAN PAUL GABRIEL M.D. ON :- 12/26/24  DATE OF SERVICE:     12/26/24        Interim events noted,Labs ,Radiological studies and Cardiology tests reviewed .    Patient is a 54y old  Male who presents with a chief complaint of SR 60-80 (26 Dec 2024 12:28)      HPI:  54M with PMH of HTN, HLD, ESRD on HD MWF via LUE AVF, ascites (requiring repeat paracenteses q4-6wks), NICM with moderate LV dysfunction w/ EF 35-40%(2023) and CAD s/p atherectomy + SALLIE to D1(4/11/2024) presents to [facility] transferred from [facility]. Patient initially presented to [facility] ED with shortness of breath. Of note he was recently admitted from 09/27-12/02 for acute cholecystitis s/p cholecystectomy. In [facility] ED, pt was hypoxic to 86% on RA, trop 1.7K, EKG no new changes, CXR fluid overload. Admitted for AHRF 2/2 fluid overload. Pt received HD on 12/18, 12/19, 12/20 and 12/22. DAPT was resumed, TTE showed improvement in LVEF to 40-45%. Stress test was abnormal with 1mm inferior STD, reversible ischemia in the inferior wall and fixed defects in the lateral, anterior and apical walls with post stress EF of 24%. Pt was then transferred to [facility] for cardiac cath which showed pLAD 70% ISR, mLCx 70%, D1 severe dz. Patient now transferred to [facility] CTS Dr. Rivers for CABG eval. Patient denies any current SOB or chest pain on admission. Otherwise denies headaches, abdominal pain, urinary or bowel changes, fevers, chills, N/V/D, sick contacts.  (24 Dec 2024 05:07)      PAST MEDICAL & SURGICAL HISTORY:  Type 2 diabetes mellitus      Hypertension      End stage renal disease  started HD 2/2019 T, Th, Sat via right chest permacath      Bone spur  right shoulder- hx of - sx done      Anemia      Injury of right wrist  hx of at age 14      History of hyperkalemia  before HD- K-6.2 - to repeat in am of sx, pt had HD today      S/P arthroscopy of right shoulder  2010      History of vascular access device  right chest permacath - 2/2019      H/O right wrist surgery  tendons repair - at age 14      AV fistula      H/O ventral hernia repair      S/P cholecystectomy          PREVIOUS DIAGNOSTIC TESTING:      ECHO  FINDINGS:    STRESS  FINDINGS:    CATHETERIZATION  FINDINGS:    MEDICATIONS  (STANDING):  amLODIPine   Tablet 10 milliGRAM(s) Oral at bedtime  aspirin enteric coated 81 milliGRAM(s) Oral daily  atorvastatin 80 milliGRAM(s) Oral at bedtime  calcium acetate 667 milliGRAM(s) Oral three times a day with meals  carvedilol 12.5 milliGRAM(s) Oral every 12 hours  enoxaparin Injectable 30 milliGRAM(s) SubCutaneous every 24 hours  epoetin ignacia (EPOGEN) Injectable 71500 Unit(s) IV Push <User Schedule>  insulin lispro (ADMELOG) corrective regimen sliding scale   SubCutaneous three times a day before meals  insulin lispro (ADMELOG) corrective regimen sliding scale   SubCutaneous at bedtime  mupirocin 2% Nasal 1 Application(s) Both Nostrils two times a day  pantoprazole    Tablet 40 milliGRAM(s) Oral before breakfast  sodium chloride 0.9% lock flush 3 milliLiter(s) IV Push every 8 hours  traZODone 100 milliGRAM(s) Oral at bedtime    MEDICATIONS  (PRN):      FAMILY HISTORY:  FH: hypertension  mother, father- alive    FH: type 2 diabetes  mother, father- alive        SOCIAL HISTORY:    CIGARETTES:    ALCOHOL:    REVIEW OF SYSTEMS:  CONSTITUTIONAL: No fever, weight loss, or fatigue  EYES: No eye pain, visual disturbances, or discharge  ENMT:  No difficulty hearing, tinnitus, vertigo; No sinus or throat pain  NECK: No pain or stiffness  RESPIRATORY: No cough, wheezing, chills or hemoptysis; No shortness of breath  CARDIOVASCULAR: No chest pain, palpitations, dizziness, or leg swelling  GASTROINTESTINAL: No abdominal or epigastric pain. No nausea, vomiting, or hematemesis; No diarrhea or constipation. No melena or hematochezia.  GENITOURINARY: No dysuria, frequency, hematuria, or incontinence  NEUROLOGICAL: No headaches, memory loss, loss of strength, numbness, or tremors  SKIN: No itching, burning, rashes, or lesions   LYMPH NODES: No enlarged glands  ENDOCRINE: No heat or cold intolerance; No hair loss  MUSCULOSKELETAL: No joint pain or swelling; No muscle, back, or extremity pain  PSYCHIATRIC: No depression, anxiety, mood swings, or difficulty sleeping  HEME/LYMPH: No easy bruising, or bleeding gums  ALLERY AND IMMUNOLOGIC: No hives or eczema    Vital Signs Last 24 Hrs  T(C): 36.7 (26 Dec 2024 19:15), Max: 36.9 (26 Dec 2024 05:29)  T(F): 98.1 (26 Dec 2024 19:15), Max: 98.4 (26 Dec 2024 05:29)  HR: 66 (26 Dec 2024 21:16) (64 - 67)  BP: 156/88 (26 Dec 2024 21:16) (144/82 - 172/85)  BP(mean): 110 (26 Dec 2024 21:16) (104 - 114)  RR: 18 (26 Dec 2024 19:15) (18 - 18)  SpO2: 95% (26 Dec 2024 19:15) (91% - 96%)    Parameters below as of 26 Dec 2024 19:15  Patient On (Oxygen Delivery Method): room air          PHYSICAL EXAM:  GENERAL: NAD, well-groomed, well-developed  HEAD:  Atraumatic, Normocephalic  EYES: EOMI, PERRLA, conjunctiva and sclera clear  ENMT: No tonsillar erythema, exudates, or enlargement; Moist mucous membranes, Good dentition, No lesions  NECK: Supple, No JVD, Normal thyroid  NERVOUS SYSTEM:  Alert & Oriented X3, Good concentration; Motor Strength 5/5 B/L upper and lower extremities; DTRs 2+ intact and symmetric  CHEST/LUNG: Clear to percussion bilaterally; No rales, rhonchi, wheezing, or rubs  HEART: Regular rate and rhythm; No murmurs, rubs, or gallops  ABDOMEN: Soft, Nontender, Nondistended; Bowel sounds present  EXTREMITIES:  2+ Peripheral Pulses, No clubbing, cyanosis, or edema  LYMPH: No lymphadenopathy noted  SKIN: No rashes or lesions      INTERPRETATION OF TELEMETRY:    [facility]    [facility]     LABS:                        8.3    6.53  )-----------( 203      ( 26 Dec 2024 08:37 )             26.6     12-26    137  |  99  |  65[H]  ----------------------------<  113[H]  5.6[H]   |  23  |  7.88[H]    Ca    9.0      26 Dec 2024 08:37  Phos  4.6     12-26  Mg     1.9     12-26    TPro  7.1  /  Alb  3.5  /  TBili  0.4  /  DBili  x   /  AST  10  /  ALT  8[L]  /  AlkPhos  109  12-26          Urinalysis Basic - ( 26 Dec 2024 08:37 )    Color: x / Appearance: x / SG: x / pH: x  Gluc: 113 mg/dL / Ketone: x  / Bili: x / Urobili: x   Blood: x / Protein: x / Nitrite: x   Leuk Esterase: x / RBC: x / WBC x   Sq Epi: x / Non Sq Epi: x / Bacteria: x      Lipid Panel:   I&O's Summary    25 Dec 2024 07:01  -  26 Dec 2024 07:00  --------------------------------------------------------  IN: 720 mL / OUT: 0 mL / NET: 720 mL    26 Dec 2024 07:01  -  26 Dec 2024 23:11  --------------------------------------------------------  IN: 900 mL / OUT: 0 mL / NET: 900 mL        RADIOLOGY & ADDITIONAL STUDIES:    < from: TTE W or WO Ultrasound Enhancing Agent (12.24.24 @ 14:25) >     CONCLUSIONS:      1. Left ventricular cavity is severely dilated. Left ventricular wall thickness is normal. Left ventricular systolic function is moderately to severely decreased with an ejection fraction of 35 % by Michaels's method of disks. Global left ventricular hypokinesis.   2. Elevated left ventricular filling pressure.   3. Severe tricuspid regurgitation.   4. Moderate-to-severe pulmonary hypertension.    < end of copied text >

## 2024-12-27 LAB
ANION GAP SERPL CALC-SCNC: 15 MMOL/L — SIGNIFICANT CHANGE UP (ref 5–17)
BLD GP AB SCN SERPL QL: NEGATIVE — SIGNIFICANT CHANGE UP
BUN SERPL-MCNC: 90 MG/DL — HIGH (ref 7–23)
CALCIUM SERPL-MCNC: 9 MG/DL — SIGNIFICANT CHANGE UP (ref 8.4–10.5)
CHLORIDE SERPL-SCNC: 98 MMOL/L — SIGNIFICANT CHANGE UP (ref 96–108)
CO2 SERPL-SCNC: 23 MMOL/L — SIGNIFICANT CHANGE UP (ref 22–31)
CREAT SERPL-MCNC: 9.21 MG/DL — HIGH (ref 0.5–1.3)
EGFR: 6 ML/MIN/1.73M2 — LOW
EGFR: 6 ML/MIN/1.73M2 — LOW
GLUCOSE SERPL-MCNC: 101 MG/DL — HIGH (ref 70–99)
HCT VFR BLD CALC: 27.1 % — LOW (ref 39–50)
HGB BLD-MCNC: 8.4 G/DL — LOW (ref 13–17)
MAGNESIUM SERPL-MCNC: 2.1 MG/DL — SIGNIFICANT CHANGE UP (ref 1.6–2.6)
MCHC RBC-ENTMCNC: 30.9 PG — SIGNIFICANT CHANGE UP (ref 27–34)
MCHC RBC-ENTMCNC: 31 G/DL — LOW (ref 32–36)
MCV RBC AUTO: 99.6 FL — SIGNIFICANT CHANGE UP (ref 80–100)
NRBC # BLD: 0 /100 WBCS — SIGNIFICANT CHANGE UP (ref 0–0)
NRBC BLD-RTO: 0 /100 WBCS — SIGNIFICANT CHANGE UP (ref 0–0)
PHOSPHATE SERPL-MCNC: 5.3 MG/DL — HIGH (ref 2.5–4.5)
PLATELET # BLD AUTO: 206 K/UL — SIGNIFICANT CHANGE UP (ref 150–400)
POTASSIUM SERPL-MCNC: 5.9 MMOL/L — HIGH (ref 3.5–5.3)
POTASSIUM SERPL-SCNC: 5.9 MMOL/L — HIGH (ref 3.5–5.3)
RBC # BLD: 2.72 M/UL — LOW (ref 4.2–5.8)
RH IG SCN BLD-IMP: POSITIVE — SIGNIFICANT CHANGE UP
SODIUM SERPL-SCNC: 136 MMOL/L — SIGNIFICANT CHANGE UP (ref 135–145)
WBC # BLD: 7.01 K/UL — SIGNIFICANT CHANGE UP (ref 3.8–10.5)
WBC # FLD AUTO: 7.01 K/UL — SIGNIFICANT CHANGE UP (ref 3.8–10.5)

## 2024-12-27 PROCEDURE — 99232 SBSQ HOSP IP/OBS MODERATE 35: CPT | Mod: FS

## 2024-12-27 RX ORDER — FOLIC ACID 1 MG/1
1 TABLET ORAL DAILY
Refills: 0 | Status: DISCONTINUED | OUTPATIENT
Start: 2024-12-27 | End: 2024-12-30

## 2024-12-27 RX ORDER — FERROUS SULFATE 137(45) MG
325 TABLET, EXTENDED RELEASE ORAL EVERY 8 HOURS
Refills: 0 | Status: DISCONTINUED | OUTPATIENT
Start: 2024-12-27 | End: 2024-12-30

## 2024-12-27 RX ORDER — ONDANSETRON HCL/PF 4 MG/2 ML
4 VIAL (ML) INJECTION ONCE
Refills: 0 | Status: COMPLETED | OUTPATIENT
Start: 2024-12-27 | End: 2024-12-27

## 2024-12-27 RX ADMIN — ENOXAPARIN SODIUM 30 MILLIGRAM(S): 100 INJECTION SUBCUTANEOUS at 11:03

## 2024-12-27 RX ADMIN — Medication 3 MILLILITER(S): at 21:41

## 2024-12-27 RX ADMIN — EPOETIN ALFA 10000 UNIT(S): 10000 SOLUTION INTRAVENOUS; SUBCUTANEOUS at 15:30

## 2024-12-27 RX ADMIN — Medication 667 MILLIGRAM(S): at 11:07

## 2024-12-27 RX ADMIN — Medication 3 MILLILITER(S): at 14:23

## 2024-12-27 RX ADMIN — Medication 325 MILLIGRAM(S): at 14:23

## 2024-12-27 RX ADMIN — Medication 40 MILLIGRAM(S): at 06:33

## 2024-12-27 RX ADMIN — MUPIROCIN CALCIUM 1 APPLICATION(S): 20 CREAM TOPICAL at 06:34

## 2024-12-27 RX ADMIN — Medication 500 MILLIGRAM(S): at 11:03

## 2024-12-27 RX ADMIN — Medication 4 MILLIGRAM(S): at 16:18

## 2024-12-27 RX ADMIN — CARVEDILOL 12.5 MILLIGRAM(S): 3.12 TABLET, FILM COATED ORAL at 06:34

## 2024-12-27 RX ADMIN — Medication 667 MILLIGRAM(S): at 17:45

## 2024-12-27 RX ADMIN — Medication 325 MILLIGRAM(S): at 06:35

## 2024-12-27 RX ADMIN — Medication 667 MILLIGRAM(S): at 08:12

## 2024-12-27 RX ADMIN — MUPIROCIN CALCIUM 1 APPLICATION(S): 20 CREAM TOPICAL at 17:46

## 2024-12-27 RX ADMIN — CARVEDILOL 12.5 MILLIGRAM(S): 3.12 TABLET, FILM COATED ORAL at 18:48

## 2024-12-27 RX ADMIN — ATORVASTATIN CALCIUM 80 MILLIGRAM(S): 80 TABLET, FILM COATED ORAL at 21:37

## 2024-12-27 RX ADMIN — Medication 325 MILLIGRAM(S): at 21:37

## 2024-12-27 RX ADMIN — Medication 100 MILLIGRAM(S): at 21:37

## 2024-12-27 RX ADMIN — Medication 3 MILLILITER(S): at 08:11

## 2024-12-27 RX ADMIN — AMLODIPINE BESYLATE 10 MILLIGRAM(S): 10 TABLET ORAL at 21:37

## 2024-12-27 RX ADMIN — Medication 81 MILLIGRAM(S): at 11:03

## 2024-12-27 RX ADMIN — INSULIN LISPRO 1: 100 INJECTION, SOLUTION INTRAVENOUS; SUBCUTANEOUS at 11:49

## 2024-12-27 RX ADMIN — FOLIC ACID 1 MILLIGRAM(S): 1 TABLET ORAL at 11:03

## 2024-12-27 NOTE — PROGRESS NOTE ADULT - ASSESSMENT
54M with PMH of HTN, HLD, ESRD on HD MWF via LUE AVF, ascites (requiring repeat paracenteses q4-6wks), NICM with moderate LV dysfunction w/ EF 35-40%(2023) and CAD s/p atherectomy + SALLIE to D1(4/11/2024) presents to [facility] transferred from [facility] for CABG eval  Nephro on [place] for HD    ESRD on HD   Regular scheduele MWF   Access LUE AVF  Last HD on 12/24  HD today, prbc with HD   Keep MAP>65  Renal Diet  Consent in physical chart    Hyperkalemia  Add Lokelma 10 grams on non dialysis days     HTN  BP fluctuating   cw home meds    CKD MBD  Can send a PTH  Ca and Phos daily    Anemia  CRISTIANE with HD  Trnasfuse if hgb <7    CAD with multivessel disease  Planned for CABG 54M with PMH of HTN, HLD, ESRD on HD MWF via LUE AVF, ascites (requiring repeat paracenteses q4-6wks), NICM with moderate LV dysfunction w/ EF 35-40%(2023) and CAD s/p atherectomy + SALLIE to D1(4/11/2024) presents to [facility] transferred from [facility] for CABG eval  Nephro on [place] for HD    ESRD on HD   Regular scheduele MWF   Access LUE AVF\  [facility]  Nephrologist Dr. Bailey  Last HD on 12/24  HD today, prbc with HD   Keep MAP>65  Renal Diet  Consent in physical chart    Hyperkalemia  Add Lokelma 10 grams on non dialysis days     HTN  BP fluctuating   cw home meds    CKD MBD  Can send a PTH  Ca and Phos daily    Anemia  CRISTIANE with HD  Trnasfuse if hgb <7    CAD with multivessel disease  Planned for CABG

## 2024-12-27 NOTE — PROGRESS NOTE ADULT - ASSESSMENT
54M with PMH of HTN, HLD, ESRD on HD MWF via LUE AVF, ascites (requiring repeat paracenteses q4-6wks), NICM with moderate LV dysfunction w/ EF 35-40%(2023) and CAD s/p atherectomy + SALLIE to D1(4/11/2024) presents to [facility] transferred from [facility]. Patient initially presented to [facility] ED with shortness of breath. Of note he was recently admitted from 09/27-12/02 for acute cholecystitis s/p cholecystectomy. In [facility] ED, pt was hypoxic to 86% on RA, trop 1.7K, EKG no new changes, CXR fluid overload. Admitted for AHRF 2/2 fluid overload. Pt received HD on 12/18, 12/19, 12/20 and 12/22. DAPT was resumed, TTE showed improvement in LVEF to 40-45%. Stress test was abnormal with 1mm inferior STD, reversible ischemia in the inferior wall and fixed defects in the lateral, anterior and apical walls with post stress EF of 24%.     Pt was then transferred to [facility] for cardiac cath which showed pLAD 70% ISR, mLCx 70%, D1 severe dz.     Patient now transferred to [facility] CTS Dr. Rivers for CABG eval.    # CAD s/p NSTEMI  Hx CAD s/p PCI LAD 4/24  Presented with ADHF elevated troponins  Positive NST  12/23-Cath- pLAD 70% ISR, mLCx 70%, D1 severe dz.   TTE EF 35%  Plan foe CABG,TVR  Was on [id number] been off Plavix since 12/23    # Acute on Chronic Systolic Heart Failure  EF-35% 12/24,severe TR  Daily HD for fluid offload    # ESRD  On HD    # T2DM  A1c-5.6  RISS      Will follow

## 2024-12-27 NOTE — PROGRESS NOTE ADULT - PROBLEM SELECTOR PLAN 2
on HD- typical schedule MWF via LUE AVF   12/24 Dialysis today   12/25 k5.2  Nephrology following on HD- typical schedule MWF via LUE AVF   HD today with 1 unit prbc  d/w renal- hd sunday with another 1 unit prbc   12/25 k5.2  Nephrology following

## 2024-12-27 NOTE — PROGRESS NOTE ADULT - SUBJECTIVE AND OBJECTIVE BOX
Patient is a 54y old  Male who presents with a chief complaint of CAD s/p NSTEMI (27 Dec 2024 06:08)      SUBJECTIVE / OVERNIGHT EVENTS:    Events noted.  CONSTITUTIONAL: No fever,  or fatigue  RESPIRATORY: No cough, wheezing,  No shortness of breath  CARDIOVASCULAR: No chest pain, palpitations, dizziness, or leg swelling  GASTROINTESTINAL: No abdominal or epigastric pain. No nausea, vomiting.  NEUROLOGICAL: No headache    MEDICATIONS  (STANDING):  amLODIPine   Tablet 10 milliGRAM(s) Oral at bedtime  ascorbic acid 500 milliGRAM(s) Oral daily  aspirin enteric coated 81 milliGRAM(s) Oral daily  atorvastatin 80 milliGRAM(s) Oral at bedtime  calcium acetate 667 milliGRAM(s) Oral three times a day with meals  carvedilol 12.5 milliGRAM(s) Oral every 12 hours  enoxaparin Injectable 30 milliGRAM(s) SubCutaneous every 24 hours  epoetin ignacia (EPOGEN) Injectable 90676 Unit(s) IV Push <User Schedule>  ferrous    sulfate 325 milliGRAM(s) Oral every 8 hours  folic acid 1 milliGRAM(s) Oral daily  insulin lispro (ADMELOG) corrective regimen sliding scale   SubCutaneous three times a day before meals  insulin lispro (ADMELOG) corrective regimen sliding scale   SubCutaneous at bedtime  mupirocin 2% Nasal 1 Application(s) Both Nostrils two times a day  pantoprazole    Tablet 40 milliGRAM(s) Oral before breakfast  sodium chloride 0.9% lock flush 3 milliLiter(s) IV Push every 8 hours  traZODone 100 milliGRAM(s) Oral at bedtime    MEDICATIONS  (PRN):        CAPILLARY BLOOD GLUCOSE      POCT Blood Glucose.: 130 mg/dL (27 Dec 2024 18:43)  POCT Blood Glucose.: 163 mg/dL (27 Dec 2024 11:47)  POCT Blood Glucose.: 116 mg/dL (27 Dec 2024 07:21)  POCT Blood Glucose.: 183 mg/dL (26 Dec 2024 21:28)    I&O's Summary    26 Dec 2024 07:01  -  27 Dec 2024 07:00  --------------------------------------------------------  IN: 900 mL / OUT: 0 mL / NET: 900 mL    27 Dec 2024 07:01  -  27 Dec 2024 21:15  --------------------------------------------------------  IN: 1680 mL / OUT: 2100 mL / NET: -420 mL        T(C): 36.7 (12-27-24 @ 20:24), Max: 36.8 (12-27-24 @ 11:00)  HR: 70 (12-27-24 @ 20:24) (63 - 75)  BP: 174/70 (12-27-24 @ 20:24) (141/63 - 174/70)  RR: 18 (12-27-24 @ 20:24) (18 - 18)  SpO2: 97% (12-27-24 @ 20:24) (97% - 100%)    PHYSICAL EXAM:  GENERAL: NAD  NECK: Supple, No JVD  CHEST/LUNG: Clear to auscultation bilaterally; No wheezing.  HEART: Regular rate and rhythm; No murmurs, rubs, or gallops  ABDOMEN: Soft, Nontender, Nondistended; Bowel sounds present  EXTREMITIES:   No edema  NEUROLOGY: AAO X 3      LABS:                        8.4    7.01  )-----------( 206      ( 27 Dec 2024 08:51 )             27.1     12-27    136  |  98  |  90[H]  ----------------------------<  101[H]  5.9[H]   |  23  |  9.21[H]    Ca    9.0      27 Dec 2024 08:52  Phos  5.3     12-27  Mg     2.1     12-27    TPro  7.1  /  Alb  3.5  /  TBili  0.4  /  DBili  x   /  AST  10  /  ALT  8[L]  /  AlkPhos  109  12-26          Urinalysis Basic - ( 27 Dec 2024 08:52 )    Color: x / Appearance: x / SG: x / pH: x  Gluc: 101 mg/dL / Ketone: x  / Bili: x / Urobili: x   Blood: x / Protein: x / Nitrite: x   Leuk Esterase: x / RBC: x / WBC x   Sq Epi: x / Non Sq Epi: x / Bacteria: x      CAPILLARY BLOOD GLUCOSE      POCT Blood Glucose.: 130 mg/dL (27 Dec 2024 18:43)  POCT Blood Glucose.: 163 mg/dL (27 Dec 2024 11:47)  POCT Blood Glucose.: 116 mg/dL (27 Dec 2024 07:21)  POCT Blood Glucose.: 183 mg/dL (26 Dec 2024 21:28)        RADIOLOGY & ADDITIONAL TESTS:    Imaging Personally Reviewed:    Consultant(s) Notes Reviewed:      Care Discussed with Consultants/Other Providers:    Donaldo Ferro MD, CMD, FACP    [address] [place] [address]  Office Tel: [phone]  Cell: [phone]

## 2024-12-27 NOTE — PROGRESS NOTE ADULT - SUBJECTIVE AND OBJECTIVE BOX
VITAL SIGNS    Telemetry:    Vital Signs Last 24 Hrs  T(C): 36.7 (24 @ 19:15), Max: 36.9 (24 @ 11:00)  T(F): 98.1 (24 @ 19:15), Max: 98.4 (24 @ 11:00)  HR: 69 (24 @ 06:30) (64 - 69)  BP: 141/63 (24 @ 06:30) (141/63 - 172/85)  RR: 18 (24 @ 19:15) (18 - 18)  SpO2: 95% (24 @ 19:15) (95% - 96%)             @ 07:  -   @ 07:00  --------------------------------------------------------  IN: 900 mL / OUT: 0 mL / NET: 900 mL       Daily     Daily Weight in k.9 (26 Dec 2024 07:30)  Admit Wt: Drug Dosing Weight  Height (cm): 180.3 (24 Dec 2024 04:16)  Weight (kg): 83.6 (24 Dec 2024 04:16)  BMI (kg/m2): 25.7 (24 Dec 2024 04:16)  BSA (m2): 2.04 (24 Dec 2024 04:16)    Bilirubin Total: 0.4 mg/dL ( @ 08:37)    CAPILLARY BLOOD GLUCOSE      POCT Blood Glucose.: 183 mg/dL (26 Dec 2024 21:28)  POCT Blood Glucose.: 139 mg/dL (26 Dec 2024 16:20)  POCT Blood Glucose.: 126 mg/dL (26 Dec 2024 11:33)  POCT Blood Glucose.: 110 mg/dL (26 Dec 2024 07:45)          LABS:     @ 07:01  -   @ 07:00  --------------------------------------------------------  IN: 900 mL / OUT: 0 mL / NET: 900 mL    cret                        8.3    6.53  )-----------( 203      ( 26 Dec 2024 08:37 )             26.6         137  |  99  |  65[H]  ----------------------------<  113[H]  5.6[H]   |  23  |  7.88[H]    Ca    9.0      26 Dec 2024 08:37  Phos  4.6       Mg     1.9         TPro  7.1  /  Alb  3.5  /  TBili  0.4  /  DBili  x   /  AST  10  /  ALT  8[L]  /  AlkPhos  109          amLODIPine   Tablet 10 milliGRAM(s) Oral at bedtime  ascorbic acid 500 milliGRAM(s) Oral daily  aspirin enteric coated 81 milliGRAM(s) Oral daily  atorvastatin 80 milliGRAM(s) Oral at bedtime  calcium acetate 667 milliGRAM(s) Oral three times a day with meals  carvedilol 12.5 milliGRAM(s) Oral every 12 hours  enoxaparin Injectable 30 milliGRAM(s) SubCutaneous every 24 hours  epoetin ignacia (EPOGEN) Injectable 93028 Unit(s) IV Push <User Schedule>  ferrous    sulfate 325 milliGRAM(s) Oral every 8 hours  folic acid 1 milliGRAM(s) Oral daily  insulin lispro (ADMELOG) corrective regimen sliding scale   SubCutaneous three times a day before meals  insulin lispro (ADMELOG) corrective regimen sliding scale   SubCutaneous at bedtime  mupirocin 2% Nasal 1 Application(s) Both Nostrils two times a day  pantoprazole    Tablet 40 milliGRAM(s) Oral before breakfast  sodium chloride 0.9% lock flush 3 milliLiter(s) IV Push every 8 hours  traZODone 100 milliGRAM(s) Oral at bedtime      PHYSICAL EXAM    Subjective: "Hi.   Neurology: alert and oriented x 3, nonfocal, no gross deficits  CV : tele:  RSR  Sternal Wound :  CDI with dressing , Stable  Lungs: clear. RR easy, unlabored   Abdomen: soft, nontender, nondistended, positive bowel sounds, bowel movement   Neg N/V/D   :  pt voiding without difficulty   Extremities:   ANAYA; edema, neg calf tenderness.   PPP bilaterally      PW:  Chest tubes:                 VITAL SIGNS    Telemetry:  rsr 55-80  Vital Signs Last 24 Hrs  T(C): 36.7 (24 @ 19:15), Max: 36.9 (24 @ 11:00)  T(F): 98.1 (24 @ 19:15), Max: 98.4 (24 @ 11:00)  HR: 69 (24 @ 06:30) (64 - 69)  BP: 141/63 (24 @ 06:30) (141/63 - 172/85)  RR: 18 (24 @ 19:15) (18 - 18)  SpO2: 95% (24 @ 19:15) (95% - 96%)             @ 07:  -   @ 07:00  --------------------------------------------------------  IN: 900 mL / OUT: 0 mL / NET: 900 mL       Daily     Daily Weight in k.9 (26 Dec 2024 07:30)  Admit Wt: Drug Dosing Weight  Height (cm): 180.3 (24 Dec 2024 04:16)  Weight (kg): 83.6 (24 Dec 2024 04:16)  BMI (kg/m2): 25.7 (24 Dec 2024 04:16)  BSA (m2): 2.04 (24 Dec 2024 04:16)    Bilirubin Total: 0.4 mg/dL ( @ 08:37)    CAPILLARY BLOOD GLUCOSE      POCT Blood Glucose.: 183 mg/dL (26 Dec 2024 21:28)  POCT Blood Glucose.: 139 mg/dL (26 Dec 2024 16:20)  POCT Blood Glucose.: 126 mg/dL (26 Dec 2024 11:33)  POCT Blood Glucose.: 110 mg/dL (26 Dec 2024 07:45)          LABS:     @ 07:01  -   @ 07:00  --------------------------------------------------------  IN: 900 mL / OUT: 0 mL / NET: 900 mL    cret                        8.3    6.53  )-----------( 203      ( 26 Dec 2024 08:37 )             26.6         137  |  99  |  65[H]  ----------------------------<  113[H]  5.6[H]   |  23  |  7.88[H]    Ca    9.0      26 Dec 2024 08:37  Phos  4.6       Mg     1.9         TPro  7.1  /  Alb  3.5  /  TBili  0.4  /  DBili  x   /  AST  10  /  ALT  8[L]  /  AlkPhos  109          amLODIPine   Tablet 10 milliGRAM(s) Oral at bedtime  ascorbic acid 500 milliGRAM(s) Oral daily  aspirin enteric coated 81 milliGRAM(s) Oral daily  atorvastatin 80 milliGRAM(s) Oral at bedtime  calcium acetate 667 milliGRAM(s) Oral three times a day with meals  carvedilol 12.5 milliGRAM(s) Oral every 12 hours  enoxaparin Injectable 30 milliGRAM(s) SubCutaneous every 24 hours  epoetin ignacia (EPOGEN) Injectable 17657 Unit(s) IV Push <User Schedule>  ferrous    sulfate 325 milliGRAM(s) Oral every 8 hours  folic acid 1 milliGRAM(s) Oral daily  insulin lispro (ADMELOG) corrective regimen sliding scale   SubCutaneous three times a day before meals  insulin lispro (ADMELOG) corrective regimen sliding scale   SubCutaneous at bedtime  mupirocin 2% Nasal 1 Application(s) Both Nostrils two times a day  pantoprazole    Tablet 40 milliGRAM(s) Oral before breakfast  sodium chloride 0.9% lock flush 3 milliLiter(s) IV Push every 8 hours  traZODone 100 milliGRAM(s) Oral at bedtime      PHYSICAL EXAM    Subjective: "I feel ok."  Neurology: alert and oriented x 3, nonfocal, no gross deficits  CV : tele:  RSR 55-80  Lungs: clear. RR easy, unlabored   Abdomen: soft, nontender, nondistended, positive bowel sounds, + bowel movement;  Neg N/V/D   :  HD patient   Extremities:   ANAYA; neg LE edema, neg calf tenderness.   PPP bilaterally; rt radial cath site cdi anahi- neg hematoma/bleeding   + left AVF + bruit/ thrill

## 2024-12-27 NOTE — PROGRESS NOTE ADULT - ASSESSMENT
54M with PMH of HTN, HLD, ESRD on HD MWF via LUE AVF, ascites (requiring repeat paracenteses q4-6wks), NICM with moderate LV dysfunction w/ EF 35-40%(2023) and CAD s/p atherectomy + SALLIE to D1(4/11/2024) presents to [facility] transferred from [facility]. Patient initially presented to [facility] ED with shortness of breath. Of note he was recently admitted from 09/27-12/02 for acute cholecystitis s/p cholecystectomy. In [facility] ED, pt was hypoxic to 86% on RA, trop 1.7K, EKG no new changes, CXR fluid overload. Admitted for AHRF 2/2 fluid overload. Pt received HD on 12/18, 12/19, 12/20 and 12/22. DAPT was resumed, TTE showed improvement in LVEF to 40-45%. Stress test was abnormal with 1mm inferior STD, reversible ischemia in the inferior wall and fixed defects in the lateral, anterior and apical walls with post stress EF of 24%. Pt was then transferred to [facility] for cardiac cath which showed pLAD 70% ISR, mLCx 70%, D1 severe dz. Patient now transferred to [facility] CTS Dr. Rivers for CABG eval. Patient denies any current SOB or chest pain on admission. Otherwise denies headaches, abdominal pain, urinary or bowel changes, fevers, chills, N/V/D, sick contacts.  12/ 25 VSS  CP free   HD via avf  for daily HD pre op- on lovenox 30 qd   12/26 HD today continue with present meds. Plan for CABG possibleTVR next week 54M with PMH of HTN, HLD, ESRD on HD MWF via LUE AVF, ascites (requiring repeat paracenteses q4-6wks), NICM with moderate LV dysfunction w/ EF 35-40%(2023) and CAD s/p atherectomy + SALLIE to D1(4/11/2024) presents to [facility] transferred from [facility]. Patient initially presented to [facility] ED with shortness of breath. Of note he was recently admitted from 09/27-12/02 for acute cholecystitis s/p cholecystectomy. In [facility] ED, pt was hypoxic to 86% on RA, trop 1.7K, EKG no new changes, CXR fluid overload. Admitted for AHRF 2/2 fluid overload. Pt received HD on 12/18, 12/19, 12/20 and 12/22. DAPT was resumed, TTE showed improvement in LVEF to 40-45%. Stress test was abnormal with 1mm inferior STD, reversible ischemia in the inferior wall and fixed defects in the lateral, anterior and apical walls with post stress EF of 24%. Pt was then transferred to [facility] for cardiac cath which showed pLAD 70% ISR, mLCx 70%, D1 severe dz. Patient now transferred to [facility] CTS Dr. Rivers for CABG eval. Patient denies any current SOB or chest pain on admission. Otherwise denies headaches, abdominal pain, urinary or bowel changes, fevers, chills, N/V/D, sick contacts.  12/ 25 VSS  CP free   HD via avf  for daily HD pre op- on lovenox 30 qd   12/26 HD today continue with present meds. Plan for CABG possibleTVR next week  12/27 vss; rsr 55-80; hd today w/ 1 unit prbc; pt to be dialzyed also this sunday 12/29 w/ another 1 unit prbc  potential OR monday 12/30 with DR. Rivers

## 2024-12-27 NOTE — PROGRESS NOTE ADULT - SUBJECTIVE AND OBJECTIVE BOX
[facility]    Dr. Nica Styles     Office [phone] (9 am to 5 pm)  Service: [phone] (5pm to 9am)  Also Available on TEAMS      RENAL PROGRESS NOTE: DATE OF SERVICE 12-27-24 @ 10:29    Patient is a 54y old  Male who presents with a chief complaint of CAD s/p NSTEMI (27 Dec 2024 06:08)      Patient seen and examined at bedside. No chest pain/sob    VITALS:  T(F): 98.1 (12-26-24 @ 19:15), Max: 98.4 (12-26-24 @ 11:00)  HR: 69 (12-27-24 @ 06:30)  BP: 141/63 (12-27-24 @ 06:30)  RR: 18 (12-26-24 @ 19:15)  SpO2: 95% (12-26-24 @ 19:15)  Wt(kg): --    12-26 @ 07:01  -  12-27 @ 07:00  --------------------------------------------------------  IN: 900 mL / OUT: 0 mL / NET: 900 mL          PHYSICAL EXAM:  Constitutional: NAD  Neck: No JVD  Respiratory: CTAB, no wheezes, rales or rhonchi  Cardiovascular: S1, S2, RRR  Gastrointestinal: BS+, soft, NT/ND  Extremities: No peripheral edema    Hospital Medications:   MEDICATIONS  (STANDING):  amLODIPine   Tablet 10 milliGRAM(s) Oral at bedtime  ascorbic acid 500 milliGRAM(s) Oral daily  aspirin enteric coated 81 milliGRAM(s) Oral daily  atorvastatin 80 milliGRAM(s) Oral at bedtime  calcium acetate 667 milliGRAM(s) Oral three times a day with meals  carvedilol 12.5 milliGRAM(s) Oral every 12 hours  enoxaparin Injectable 30 milliGRAM(s) SubCutaneous every 24 hours  epoetin ignacia (EPOGEN) Injectable 45383 Unit(s) IV Push <User Schedule>  ferrous    sulfate 325 milliGRAM(s) Oral every 8 hours  folic acid 1 milliGRAM(s) Oral daily  insulin lispro (ADMELOG) corrective regimen sliding scale   SubCutaneous three times a day before meals  insulin lispro (ADMELOG) corrective regimen sliding scale   SubCutaneous at bedtime  mupirocin 2% Nasal 1 Application(s) Both Nostrils two times a day  pantoprazole    Tablet 40 milliGRAM(s) Oral before breakfast  sodium chloride 0.9% lock flush 3 milliLiter(s) IV Push every 8 hours  traZODone 100 milliGRAM(s) Oral at bedtime      LABS:  12-27    136  |  98  |  90[H]  ----------------------------<  101[H]  5.9[H]   |  23  |  9.21[H]    Ca    9.0      27 Dec 2024 08:52  Phos  5.3     12-27  Mg     2.1     12-27    TPro  7.1  /  Alb  3.5  /  TBili  0.4  /  DBili      /  AST  10  /  ALT  8[L]  /  AlkPhos  109  12-26    Creatinine Trend: 9.21 <--, 7.88 <--, 6.47 <--, 7.65 <--, 7.48 <--    Phosphorus: 5.3 mg/dL (12-27 @ 08:52)                              8.4    7.01  )-----------( 206      ( 27 Dec 2024 08:51 )             27.1     Urine Studies:  Urinalysis - [12-27-24 @ 08:52]      Color  / Appearance  / SG  / pH       Gluc 101 / Ketone   / Bili  / Urobili        Blood  / Protein  / Leuk Est  / Nitrite       RBC  / WBC  / Hyaline  / Gran  / Sq Epi  / Non Sq Epi  / Bacteria       Iron 29, TIBC 143, %sat 20      [12-19-24 @ 05:00]  Ferritin 904      [12-19-24 @ 05:00]  TSH 4.75      [12-24-24 @ 06:50]    HBsAg Nonreact      [12-19-24 @ 05:00]      RADIOLOGY & ADDITIONAL STUDIES:

## 2024-12-27 NOTE — PROGRESS NOTE ADULT - ASSESSMENT
54M with PMH of HTN, HLD, ESRD on HD MWF via LUE AVF, ascites (requiring repeat paracenteses q4-6wks), NICM with moderate LV dysfunction w/ EF 35-40%(2023) and CAD s/p atherectomy + SALLIE to D1(4/11/2024) presents to [facility] transferred from [facility]. Patient initially presented to [facility] ED with shortness of breath. Of note he was recently admitted from 09/27-12/02 for acute cholecystitis s/p cholecystectomy. In [facility] ED, pt was hypoxic to 86% on RA, trop 1.7K, EKG no new changes, CXR fluid overload. Admitted for AHRF 2/2 fluid overload. Pt received HD on 12/18, 12/19, 12/20 and 12/22. DAPT was resumed, TTE showed improvement in LVEF to 40-45%. Stress test was abnormal with 1mm inferior STD, reversible ischemia in the inferior wall and fixed defects in the lateral, anterior and apical walls with post stress EF of 24%. Pt was then transferred to [facility] for cardiac cath which showed pLAD 70% ISR, mLCx 70%, D1 severe dz. Patient now transferred to [facility] CTS Dr. Rivers for CABG eval.     CAD, multiple vessel:    CTS/Cardio f/up appreciated.  For CABG  ASA/Coreg/Atorvastatin    ESRD:    Nephro f/up appreciated  On HD  On Epogen

## 2024-12-27 NOTE — PROGRESS NOTE ADULT - SUBJECTIVE AND OBJECTIVE BOX
MR#[id number]  PATIENT NAME:RAAD CANO    DATE OF SERVICE: 12-27-24 @ 06:09  Patient was seen and examined by Solo Quick MD on    12-27-24 @ 06:09 .  Interim events noted.Consultant notes ,Labs,Telemetry reviewed by me       HOSPITAL COURSE: HPI:  54M with PMH of HTN, HLD, ESRD on HD MWF via LUE AVF, ascites (requiring repeat paracenteses q4-6wks), NICM with moderate LV dysfunction w/ EF 35-40%(2023) and CAD s/p atherectomy + SALLIE to D1(4/11/2024) presents to [facility] transferred from [facility]. Patient initially presented to [facility] ED with shortness of breath. Of note he was recently admitted from 09/27-12/02 for acute cholecystitis s/p cholecystectomy. In [facility] ED, pt was hypoxic to 86% on RA, trop 1.7K, EKG no new changes, CXR fluid overload. Admitted for AHRF 2/2 fluid overload. Pt received HD on 12/18, 12/19, 12/20 and 12/22. DAPT was resumed, TTE showed improvement in LVEF to 40-45%. Stress test was abnormal with 1mm inferior STD, reversible ischemia in the inferior wall and fixed defects in the lateral, anterior and apical walls with post stress EF of 24%. Pt was then transferred to [facility] for cardiac cath which showed pLAD 70% ISR, mLCx 70%, D1 severe dz. Patient now transferred to [facility] CTS Dr. Rivers for CABG eval. Patient denies any current SOB or chest pain on admission. Otherwise denies headaches, abdominal pain, urinary or bowel changes, fevers, chills, N/V/D, sick contacts.  (24 Dec 2024 05:07)      INTERIM EVENTS:Patient seen at bedside ,interim events noted.  12/23 Cardiac cath  pLAD 70% ISR, mLCx 70%, D1 severe dz.   12/ 25 VSS  CP free   HD via avf  for daily HD pre op- on lovenox 30 qd   12/26 HD today continue with present meds. Plan for CABG possibleTVR next week  12/27- VSS Sinus rhythm       PMH -reviewed admission note, no change since admission  HEART FAILURE: Acute[x ]Chronic[x ] Systolic[x ] Diastolic[ ] Combined Systolic and Diastolic[ ]  CAD[x ] CABG[ ] PCI[x ]  DEVICES[ ] PPM[ ] ICD[ ] ILR[ ]  ATRIAL FIBRILLATION[ ] Paroxysmal[ ] Permanent[ ] CHADS2-[  ]  GHASSAN[ ] CKD1[ ] CKD2[ ] CKD3[ ] CKD4[ ] ESRD[x ]  COPD[ ] HTN[x ]   DM[ ] Type1[ ] Type 2[ ]   CVA[ ] Paresis[ ]    AMBULATION: Assisted[ ] Cane/walker[ ] Independent[x ]    MEDICATIONS  (STANDING):  amLODIPine   Tablet 10 milliGRAM(s) Oral at bedtime  ascorbic acid 500 milliGRAM(s) Oral daily  aspirin enteric coated 81 milliGRAM(s) Oral daily  atorvastatin 80 milliGRAM(s) Oral at bedtime  calcium acetate 667 milliGRAM(s) Oral three times a day with meals  carvedilol 12.5 milliGRAM(s) Oral every 12 hours  enoxaparin Injectable 30 milliGRAM(s) SubCutaneous every 24 hours  epoetin ignacia (EPOGEN) Injectable 51137 Unit(s) IV Push <User Schedule>  ferrous    sulfate 325 milliGRAM(s) Oral every 8 hours  folic acid 1 milliGRAM(s) Oral daily  insulin lispro (ADMELOG) corrective regimen sliding scale   SubCutaneous three times a day before meals  insulin lispro (ADMELOG) corrective regimen sliding scale   SubCutaneous at bedtime  mupirocin 2% Nasal 1 Application(s) Both Nostrils two times a day  pantoprazole    Tablet 40 milliGRAM(s) Oral before breakfast  sodium chloride 0.9% lock flush 3 milliLiter(s) IV Push every 8 hours  traZODone 100 milliGRAM(s) Oral at bedtime                REVIEW OF SYSTEMS:  Constitutional: [ ] fever, [ ]weight loss,  [x ]fatigue [ ]weight gain  Eyes: [ ] visual changes  Respiratory: [ ]shortness of breath;  [ ] cough, [ ]wheezing, [ ]chills, [ ]hemoptysis  Cardiovascular: [ ] chest pain, [ ]palpitations, [ ]dizziness,  [ ]leg swelling[ ]orthopnea[ ]PND  Gastrointestinal: [ ] abdominal pain, [ ]nausea, [ ]vomiting,  [ ]diarrhea [ ]Constipation [ ]Melena  Genitourinary: [ ] dysuria, [ ] hematuria [ ]Vigil  Neurologic: [ ] headaches [ ] tremors[ ]weakness [ ]Paralysis Right[ ] Left[ ]  Skin: [ ] itching, [ ]burning, [ ] rashes  Endocrine: [ ] heat or cold intolerance  Musculoskeletal: [ ] joint pain or swelling; [ ] muscle, back, or extremity pain  Psychiatric: [ ] depression, [ ]anxiety, [ ]mood swings, or [ ]difficulty sleeping  Hematologic: [ ] easy bruising, [ ] bleeding gums    [ ] All remaining systems negative except as per above.   [ ]Unable to obtain.  [x] No change in ROS since admission      Vital Signs Last 24 Hrs  T(C): 36.7 (26 Dec 2024 19:15), Max: 36.9 (26 Dec 2024 11:00)  T(F): 98.1 (26 Dec 2024 19:15), Max: 98.4 (26 Dec 2024 11:00)  HR: 66 (26 Dec 2024 21:16) (64 - 66)  BP: 156/88 (26 Dec 2024 21:16) (144/82 - 172/85)  BP(mean): 110 (26 Dec 2024 21:16) (110 - 114)  RR: 18 (26 Dec 2024 19:15) (18 - 18)  SpO2: 95% (26 Dec 2024 19:15) (95% - 96%)    Parameters below as of 26 Dec 2024 19:15  Patient On (Oxygen Delivery Method): room air      I&O's Summary    25 Dec 2024 07:01  -  26 Dec 2024 07:00  --------------------------------------------------------  IN: 720 mL / OUT: 0 mL / NET: 720 mL    26 Dec 2024 07:01  -  27 Dec 2024 06:09  --------------------------------------------------------  IN: 900 mL / OUT: 0 mL / NET: 900 mL        PHYSICAL EXAM:  General: No acute distress BMI-31  HEENT: EOMI, PERRL  Neck: Supple, [ ] JVD  Lungs: Equal air entry bilaterally; [ ] rales [ ] wheezing [ ] rhonchi  Heart: Regular rate and rhythm; [x ] murmur   2/6 [ x] systolic [ ] diastolic [ ] radiation[ ] rubs [ ]  gallops  Abdomen: Nontender, bowel sounds present  Extremities: No clubbing, cyanosis, [ ] edema [ ]Pulses  equal and intact  Nervous system:  Alert & Oriented X3, no focal deficits  Psychiatric: Normal affect  Skin: No rashes or lesions    LABS:  12-26    137  |  99  |  65[H]  ----------------------------<  113[H]  5.6[H]   |  23  |  7.88[H]    Ca    9.0      26 Dec 2024 08:37  Phos  4.6     12-26  Mg     1.9     12-26    TPro  7.1  /  Alb  3.5  /  TBili  0.4  /  DBili  x   /  AST  10  /  ALT  8[L]  /  AlkPhos  109  12-26    Creatinine Trend: 7.88<--, 6.47<--, 7.65<--, 7.48<--, 6.44<--, 4.77<--                        8.3    6.53  )-----------( 203      ( 26 Dec 2024 08:37 )             26.6        TTE W or WO Ultrasound Enhancing Agent (12.24.24 @ 14:25) >  CONCLUSIONS:      1. Left ventricular cavity is severely dilated. Left ventricular wall thickness is normal. Left ventricular systolic function is moderately to severely decreased with an ejection fraction of 35 % by Michaels's method of disks. Global left ventricular hypokinesis. EF 35%   2. Elevated left ventricular filling pressure.   3. Severe tricuspid regurgitation.   4. Moderate-to-severe pulmonary hypertension.        12 Lead ECG (12.25.24 @ 08:39) >   NORMAL SINUS RHYTHM  POSSIBLE LEFT ATRIAL ENLARGEMENT  PULMONARY DISEASE PATTERN  INCOMPLETE RIGHT BUNDLE BRANCH BLOCK  LEFT ANTERIOR FASCICULAR BLOCK  T WAVE ABNORMALITY, CONSIDER LATERAL ISCHEMIA  ABNORMAL ECG     Xray Chest 1 View- PORTABLE-Urgent (Xray Chest 1 View- PORTABLE-Urgent .) (12.24.24 @ 10:20) >  IMPRESSION:  Mild perihilar fullness. Nofocal consolidations.  Interval resolution of bilateral pleural effusions.       Cardiac Catheterization (12.23.24 @ 17:55) >  Diagnostic Conclusions:   Severe in-stent restenosis in LAD/D1 bifurcation. Severe in-stent stenosis in mid Cx. Elevated LVEDP 25 mmHg.

## 2024-12-28 LAB
ANION GAP SERPL CALC-SCNC: 17 MMOL/L — SIGNIFICANT CHANGE UP (ref 5–17)
BUN SERPL-MCNC: 63 MG/DL — HIGH (ref 7–23)
CALCIUM SERPL-MCNC: 8.9 MG/DL — SIGNIFICANT CHANGE UP (ref 8.4–10.5)
CHLORIDE SERPL-SCNC: 100 MMOL/L — SIGNIFICANT CHANGE UP (ref 96–108)
CO2 SERPL-SCNC: 24 MMOL/L — SIGNIFICANT CHANGE UP (ref 22–31)
CREAT SERPL-MCNC: 7.2 MG/DL — HIGH (ref 0.5–1.3)
EGFR: 8 ML/MIN/1.73M2 — LOW
EGFR: 8 ML/MIN/1.73M2 — LOW
GLUCOSE SERPL-MCNC: 119 MG/DL — HIGH (ref 70–99)
HCT VFR BLD CALC: 30.9 % — LOW (ref 39–50)
HGB BLD-MCNC: 9.8 G/DL — LOW (ref 13–17)
MCHC RBC-ENTMCNC: 31.4 PG — SIGNIFICANT CHANGE UP (ref 27–34)
MCHC RBC-ENTMCNC: 31.7 G/DL — LOW (ref 32–36)
NRBC # BLD: 0 /100 WBCS — SIGNIFICANT CHANGE UP (ref 0–0)
NRBC BLD-RTO: 0 /100 WBCS — SIGNIFICANT CHANGE UP (ref 0–0)
PLATELET # BLD AUTO: 223 K/UL — SIGNIFICANT CHANGE UP (ref 150–400)
POTASSIUM SERPL-MCNC: 5.1 MMOL/L — SIGNIFICANT CHANGE UP (ref 3.5–5.3)
POTASSIUM SERPL-SCNC: 5.1 MMOL/L — SIGNIFICANT CHANGE UP (ref 3.5–5.3)
RBC # BLD: 3.12 M/UL — LOW (ref 4.2–5.8)
RBC # FLD: 18.2 % — HIGH (ref 10.3–14.5)
SODIUM SERPL-SCNC: 141 MMOL/L — SIGNIFICANT CHANGE UP (ref 135–145)
WBC # BLD: 6.95 K/UL — SIGNIFICANT CHANGE UP (ref 3.8–10.5)
WBC # FLD AUTO: 6.95 K/UL — SIGNIFICANT CHANGE UP (ref 3.8–10.5)

## 2024-12-28 PROCEDURE — 99232 SBSQ HOSP IP/OBS MODERATE 35: CPT | Mod: FS

## 2024-12-28 RX ADMIN — Medication 667 MILLIGRAM(S): at 07:57

## 2024-12-28 RX ADMIN — MUPIROCIN CALCIUM 1 APPLICATION(S): 20 CREAM TOPICAL at 07:57

## 2024-12-28 RX ADMIN — Medication 325 MILLIGRAM(S): at 21:42

## 2024-12-28 RX ADMIN — Medication 100 MILLIGRAM(S): at 21:41

## 2024-12-28 RX ADMIN — MUPIROCIN CALCIUM 1 APPLICATION(S): 20 CREAM TOPICAL at 17:00

## 2024-12-28 RX ADMIN — CARVEDILOL 12.5 MILLIGRAM(S): 3.12 TABLET, FILM COATED ORAL at 17:00

## 2024-12-28 RX ADMIN — Medication 81 MILLIGRAM(S): at 11:48

## 2024-12-28 RX ADMIN — Medication 667 MILLIGRAM(S): at 11:48

## 2024-12-28 RX ADMIN — Medication 325 MILLIGRAM(S): at 13:16

## 2024-12-28 RX ADMIN — ATORVASTATIN CALCIUM 80 MILLIGRAM(S): 80 TABLET, FILM COATED ORAL at 21:41

## 2024-12-28 RX ADMIN — Medication 325 MILLIGRAM(S): at 07:57

## 2024-12-28 RX ADMIN — Medication 3 MILLILITER(S): at 22:10

## 2024-12-28 RX ADMIN — Medication 3 MILLILITER(S): at 06:50

## 2024-12-28 RX ADMIN — Medication 40 MILLIGRAM(S): at 07:57

## 2024-12-28 RX ADMIN — CARVEDILOL 12.5 MILLIGRAM(S): 3.12 TABLET, FILM COATED ORAL at 07:57

## 2024-12-28 RX ADMIN — FOLIC ACID 1 MILLIGRAM(S): 1 TABLET ORAL at 11:48

## 2024-12-28 RX ADMIN — Medication 667 MILLIGRAM(S): at 17:00

## 2024-12-28 RX ADMIN — AMLODIPINE BESYLATE 10 MILLIGRAM(S): 10 TABLET ORAL at 21:41

## 2024-12-28 RX ADMIN — Medication 3 MILLILITER(S): at 16:27

## 2024-12-28 RX ADMIN — Medication 500 MILLIGRAM(S): at 11:49

## 2024-12-28 NOTE — PROGRESS NOTE ADULT - PROBLEM SELECTOR PLAN 1
preop workup in progress   c/w ASA, Statin, coreg 12.5mg Q12   p2y12 321  Lvx 30mg q 24 hrs   12/24 carotids completed: No sig stenosis   12/20 TTE EF 40 - 45%, Mild MR, Mod Tricupsid regurgitation  hd today and sun 12/29  OR mon with Dr. Rivers preop workup in progress   c/w ASA, Statin, coreg 12.5mg Q12   p2y12 321  Lvx 30mg q 24 hrs -d/c after am dose sun   12/24 carotids completed: No sig stenosis   12/20 TTE EF 40 - 45%, Mild MR, Mod Tricupsid regurgitation  hd today and sun 12/29  OR mon with Dr. Rivers

## 2024-12-28 NOTE — PROGRESS NOTE ADULT - ASSESSMENT
54M with PMH of HTN, HLD, ESRD on HD MWF via LUE AVF, ascites (requiring repeat paracenteses q4-6wks), NICM with moderate LV dysfunction w/ EF 35-40%(2023) and CAD s/p atherectomy + SALLIE to D1(4/11/2024) presents to [facility] transferred from [facility]. Patient initially presented to [facility] ED with shortness of breath. Of note he was recently admitted from 09/27-12/02 for acute cholecystitis s/p cholecystectomy. In [facility] ED, pt was hypoxic to 86% on RA, trop 1.7K, EKG no new changes, CXR fluid overload. Admitted for AHRF 2/2 fluid overload. Pt received HD on 12/18, 12/19, 12/20 and 12/22. DAPT was resumed, TTE showed improvement in LVEF to 40-45%. Stress test was abnormal with 1mm inferior STD, reversible ischemia in the inferior wall and fixed defects in the lateral, anterior and apical walls with post stress EF of 24%.     Pt was then transferred to [facility] for cardiac cath which showed pLAD 70% ISR, mLCx 70%, D1 severe dz.     Patient now transferred to [facility] CTS Dr. Rivers for CABG eval.    # CAD s/p NSTEMI  Hx CAD s/p PCI LAD 4/24  Presented with ADHF elevated troponins  Positive NST  12/23-Cath- pLAD 70% ISR, mLCx 70%, D1 severe dz.   TTE EF 35%  Plan foe CABG,TVR  Was on Plavix-p2y12- 321 been off Plavix since 12/23  Plan for CABG/TVR on Monday    # Acute on Chronic Systolic Heart Failure  EF-35% 12/24,severe TR  Daily HD for fluid offload    # ESRD  On HD  HD tomorrow    # T2DM  A1c-5.6  RISS      Will follow

## 2024-12-28 NOTE — PROGRESS NOTE ADULT - ASSESSMENT
54M with PMH of HTN, HLD, ESRD on HD MWF via LUE AVF, ascites (requiring repeat paracenteses q4-6wks), NICM with moderate LV dysfunction w/ EF 35-40%(2023) and CAD s/p atherectomy + SALLIE to D1(4/11/2024) presents to [facility] transferred from [facility]. Patient initially presented to [facility] ED with shortness of breath. Of note he was recently admitted from 09/27-12/02 for acute cholecystitis s/p cholecystectomy. In [facility] ED, pt was hypoxic to 86% on RA, trop 1.7K, EKG no new changes, CXR fluid overload. Admitted for AHRF 2/2 fluid overload. Pt received HD on 12/18, 12/19, 12/20 and 12/22. DAPT was resumed, TTE showed improvement in LVEF to 40-45%. Stress test was abnormal with 1mm inferior STD, reversible ischemia in the inferior wall and fixed defects in the lateral, anterior and apical walls with post stress EF of 24%. Pt was then transferred to [facility] for cardiac cath which showed pLAD 70% ISR, mLCx 70%, D1 severe dz. Patient now transferred to [facility] CTS Dr. Rivers for CABG eval. Patient denies any current SOB or chest pain on admission. Otherwise denies headaches, abdominal pain, urinary or bowel changes, fevers, chills, N/V/D, sick contacts.  12/ 25 VSS  CP free   HD via avf  for daily HD pre op- on lovenox 30 qd   12/26 HD today continue with present meds. Plan for CABG possibleTVR next week  12/27 vss; rsr 55-80; hd today w/ 1 unit prbc; pt to be dialzyed also this sunday 12/29 w/ another 1 unit prbc  potential OR monday 12/30 with DR. Rivers  54M with PMH of HTN, HLD, ESRD on HD MWF via LUE AVF, ascites (requiring repeat paracenteses q4-6wks), NICM with moderate LV dysfunction w/ EF 35-40%(2023) and CAD s/p atherectomy + SALLIE to D1(4/11/2024) presents to [facility] transferred from [facility]. Patient initially presented to [facility] ED with shortness of breath. Of note he was recently admitted from 09/27-12/02 for acute cholecystitis s/p cholecystectomy. In [facility] ED, pt was hypoxic to 86% on RA, trop 1.7K, EKG no new changes, CXR fluid overload. Admitted for AHRF 2/2 fluid overload. Pt received HD on 12/18, 12/19, 12/20 and 12/22. DAPT was resumed, TTE showed improvement in LVEF to 40-45%. Stress test was abnormal with 1mm inferior STD, reversible ischemia in the inferior wall and fixed defects in the lateral, anterior and apical walls with post stress EF of 24%. Pt was then transferred to [facility] for cardiac cath which showed pLAD 70% ISR, mLCx 70%, D1 severe dz. Patient now transferred to [facility] CTS Dr. Rivers for CABG eval. Patient denies any current SOB or chest pain on admission. Otherwise denies headaches, abdominal pain, urinary or bowel changes, fevers, chills, N/V/D, sick contacts.  12/ 25 VSS  CP free   HD via avf  for daily HD pre op- on lovenox 30 qd   12/26 HD today continue with present meds. Plan for CABG possibleTVR next week  12/27 vss; rsr 55-80; hd today w/ 1 unit prbc; pt to be dialzyed also this sunday 12/29 w/ another 1 unit prbc  12/28 VSS; RSR 60-80; continue asa/bb/ statin; HD in am - transfuse 1 unit prbc with HD; d/c lovenox after am dose sun   OR monday 12/30 with DR. Rivers

## 2024-12-28 NOTE — PROGRESS NOTE ADULT - SUBJECTIVE AND OBJECTIVE BOX
MR#[id number]  PATIENT NAME:RAAD CANO    DATE OF SERVICE: 12-28-24 @ 07:05  Patient was seen and examined by Solo Quick MD on    12-28-24 @ 07:05 .  Interim events noted.Consultant notes ,Labs,Telemetry reviewed by me       HOSPITAL COURSE: HPI:  54M with PMH of HTN, HLD, ESRD on HD MWF via LUE AVF, ascites (requiring repeat paracenteses q4-6wks), NICM with moderate LV dysfunction w/ EF 35-40%(2023) and CAD s/p atherectomy + SALLIE to D1(4/11/2024) presents to [facility] transferred from [facility]. Patient initially presented to [facility] ED with shortness of breath. Of note he was recently admitted from 09/27-12/02 for acute cholecystitis s/p cholecystectomy. In [facility] ED, pt was hypoxic to 86% on RA, trop 1.7K, EKG no new changes, CXR fluid overload. Admitted for AHRF 2/2 fluid overload. Pt received HD on 12/18, 12/19, 12/20 and 12/22. DAPT was resumed, TTE showed improvement in LVEF to 40-45%. Stress test was abnormal with 1mm inferior STD, reversible ischemia in the inferior wall and fixed defects in the lateral, anterior and apical walls with post stress EF of 24%. Pt was then transferred to [facility] for cardiac cath which showed pLAD 70% ISR, mLCx 70%, D1 severe dz. Patient now transferred to [facility] CTS Dr. Rivers for CABG eval. Patient denies any current SOB or chest pain on admission. Otherwise denies headaches, abdominal pain, urinary or bowel changes, fevers, chills, N/V/D, sick contacts.  (24 Dec 2024 05:07)      INTERIM EVENTS:Patient seen at bedside ,interim events noted.  12/23 Cardiac cath  pLAD 70% ISR, mLCx 70%, D1 severe dz.   12/ 25 VSS  CP free   HD via avf  for daily HD pre op- on lovenox 30 qd   12/26 HD today continue with present meds. Plan for CABG possibleTVR next week  12/27- VSS Sinus rhythm   12/28-Sinus rhythm plan for OR on Monday HD tomorrow        [facility] -reviewed admission note, no change since admission  HEART FAILURE: Acute[x ]Chronic[ ] Systolic[x ] Diastolic[ ] Combined Systolic and Diastolic[ ]  CAD[ x] CABG[ ] PCI[x ]  DEVICES[ ] PPM[ ] ICD[ ] ILR[ ]  ATRIAL FIBRILLATION[ ] Paroxysmal[ ] Permanent[ ] CHADS2-[  ]  GHASSAN[ ] CKD1[ ] CKD2[ ] CKD3[ ] CKD4[ ] ESRD[x ]  COPD[ ] HTN[ ]   DM[ ] Type1[ ] Type 2[ ]   CVA[ ] Paresis[ ]    AMBULATION: Assisted[ ] Cane/walker[ ] Independent[x ]    MEDICATIONS  (STANDING):  amLODIPine   Tablet 10 milliGRAM(s) Oral at bedtime  ascorbic acid 500 milliGRAM(s) Oral daily  aspirin enteric coated 81 milliGRAM(s) Oral daily  atorvastatin 80 milliGRAM(s) Oral at bedtime  calcium acetate 667 milliGRAM(s) Oral three times a day with meals  carvedilol 12.5 milliGRAM(s) Oral every 12 hours  enoxaparin Injectable 30 milliGRAM(s) SubCutaneous every 24 hours  epoetin ignacia (EPOGEN) Injectable 57494 Unit(s) IV Push <User Schedule>  ferrous    sulfate 325 milliGRAM(s) Oral every 8 hours  folic acid 1 milliGRAM(s) Oral daily  insulin lispro (ADMELOG) corrective regimen sliding scale   SubCutaneous three times a day before meals  insulin lispro (ADMELOG) corrective regimen sliding scale   SubCutaneous at bedtime  mupirocin 2% Nasal 1 Application(s) Both Nostrils two times a day  pantoprazole    Tablet 40 milliGRAM(s) Oral before breakfast  sodium chloride 0.9% lock flush 3 milliLiter(s) IV Push every 8 hours  traZODone 100 milliGRAM(s) Oral at bedtime    MEDICATIONS  (PRN):            REVIEW OF SYSTEMS:  Constitutional: [ ] fever, [ ]weight loss,  [ ]fatigue [ ]weight gain  Eyes: [ ] visual changes  Respiratory: [ ]shortness of breath;  [ ] cough, [ ]wheezing, [ ]chills, [ ]hemoptysis  Cardiovascular: [ ] chest pain, [ ]palpitations, [ ]dizziness,  [ ]leg swelling[ ]orthopnea[ ]PND  Gastrointestinal: [ ] abdominal pain, [ ]nausea, [ ]vomiting,  [ ]diarrhea [ ]Constipation [ ]Melena  Genitourinary: [ ] dysuria, [ ] hematuria [ ]Vigil  Neurologic: [ ] headaches [ ] tremors[ ]weakness [ ]Paralysis Right[ ] Left[ ]  Skin: [ ] itching, [ ]burning, [ ] rashes  Endocrine: [ ] heat or cold intolerance  Musculoskeletal: [ ] joint pain or swelling; [ ] muscle, back, or extremity pain  Psychiatric: [ ] depression, [ ]anxiety, [ ]mood swings, or [ ]difficulty sleeping  Hematologic: [ ] easy bruising, [ ] bleeding gums    [ ] All remaining systems negative except as per above.   [ ]Unable to obtain.  [x] No change in ROS since admission      Vital Signs Last 24 Hrs  T(C): 36.7 (27 Dec 2024 20:24), Max: 36.8 (27 Dec 2024 11:00)  T(F): 98 (27 Dec 2024 20:24), Max: 98.2 (27 Dec 2024 11:00)  HR: 70 (27 Dec 2024 20:24) (63 - 75)  BP: 174/70 (27 Dec 2024 20:24) (149/74 - 174/70)  RR: 18 (27 Dec 2024 20:24) (18 - 18)  SpO2: 97% (27 Dec 2024 20:24) (97% - 100%)    Parameters below as of 27 Dec 2024 20:24  Patient On (Oxygen Delivery Method): room air      I&O's Summary    27 Dec 2024 07:01  -  28 Dec 2024 07:00  --------------------------------------------------------  IN: 1680 mL / OUT: 2100 mL / NET: -420 mL        PHYSICAL EXAM:  General: No acute distress BMI-30  HEENT: EOMI, PERRL  Neck: Supple, [ ] JVD  Lungs: Equal air entry bilaterally; [ ] rales [ ] wheezing [ ] rhonchi  Heart: Regular rate and rhythm; [x ] murmur   2/6 [ x] systolic [ ] diastolic [ ] radiation[ ] rubs [ ]  gallops  Abdomen: Nontender, bowel sounds present  Extremities: No clubbing, cyanosis, [ ] edema [ ]Pulses  equal and intact  Nervous system:  Alert & Oriented X3, no focal deficits  Psychiatric: Normal affect  Skin: No rashes or lesions    LABS:  12-27    136  |  98  |  90[H]  ----------------------------<  101[H]  5.9[H]   |  23  |  9.21[H]    Ca    9.0      27 Dec 2024 08:52  Phos  5.3     12-27  Mg     2.1     12-27    TPro  7.1  /  Alb  3.5  /  TBili  0.4  /  DBili  x   /  AST  10  /  ALT  8[L]  /  AlkPhos  109  12-26    Creatinine Trend: 9.21<--, 7.88<--, 6.47<--, 7.65<--, 7.48<--, 6.44<--                        8.4    7.01  )-----------( 206      ( 27 Dec 2024 08:51 )             27.1     TTE W or WO Ultrasound Enhancing Agent (12.24.24 @ 14:25) >  CONCLUSIONS:      1. Left ventricular cavity is severely dilated. Left ventricular wall thickness is normal. Left ventricular systolic function is moderately to severely decreased with an ejection fraction of 35 % by Michaels's method of disks. Global left ventricular hypokinesis. EF 35%   2. Elevated left ventricular filling pressure.   3. Severe tricuspid regurgitation.   4. Moderate-to-severe pulmonary hypertension.        12 Lead ECG (12.25.24 @ 08:39) >   NORMAL SINUS RHYTHM  POSSIBLE LEFT ATRIAL ENLARGEMENT  PULMONARY DISEASE PATTERN  INCOMPLETE RIGHT BUNDLE BRANCH BLOCK  LEFT ANTERIOR FASCICULAR BLOCK  T WAVE ABNORMALITY, CONSIDER LATERAL ISCHEMIA  ABNORMAL ECG  Xray Chest 1 View- PORTABLE-Urgent (Xray Chest 1 View- PORTABLE-Urgent .) (12.24.24 @ 10:20) >  IMPRESSION:  Mild perihilar fullness. Nofocal consolidations.  Interval resolution of bilateral pleural effusions.       Cardiac Catheterization (12.23.24 @ 17:55) >  Diagnostic Conclusions:   Severe in-stent restenosis in LAD/D1 bifurcation. Severe in-stent stenosis in mid Cx. Elevated LVEDP 25 mmHg.

## 2024-12-28 NOTE — PROGRESS NOTE ADULT - ASSESSMENT
54M with PMH of HTN, HLD, ESRD on HD MWF via LUE AVF, ascites (requiring repeat paracenteses q4-6wks), NICM with moderate LV dysfunction w/ EF 35-40%(2023) and CAD s/p atherectomy + SALLIE to D1(4/11/2024) presents to [facility] transferred from [facility] for CABG eval  Nephro on [facility] for HD    ESRD on HD   Regular scheduele MWF   Access LUE AVF\  [facility]  Nephrologist Dr. Bailey  Last HD on 12/24  S/p HD on 12/27 will arrange for HD on 12/29 due to Holiday scheduele, can receive prbc with HD if indicated  Keep MAP>65  Renal Diet  Consent in physical chart    Hyperkalemia  Add Lokelma 10 grams on non dialysis days     HTN  BP fluctuating   cw home meds    CKD MBD  Can send a PTH  Ca and Phos daily    Anemia  CRISTIANE with HD  Trnasfuse if hgb <7    CAD with multivessel disease  Planned for CABG

## 2024-12-28 NOTE — PROGRESS NOTE ADULT - SUBJECTIVE AND OBJECTIVE BOX
Patient is a 54y old  Male who presents with a chief complaint of CAD s/p NSTEMI (27 Dec 2024 06:08)      SUBJECTIVE / OVERNIGHT EVENTS:    Events noted.  CONSTITUTIONAL: No fever,  or fatigue  RESPIRATORY: No cough, wheezing,  No shortness of breath  CARDIOVASCULAR: No chest pain, palpitations, dizziness, or leg swelling  GASTROINTESTINAL: No abdominal or epigastric pain. No nausea, vomiting.  NEUROLOGICAL: No headache    MEDICATIONS  (STANDING):  amLODIPine   Tablet 10 milliGRAM(s) Oral at bedtime  ascorbic acid 500 milliGRAM(s) Oral daily  aspirin enteric coated 81 milliGRAM(s) Oral daily  atorvastatin 80 milliGRAM(s) Oral at bedtime  calcium acetate 667 milliGRAM(s) Oral three times a day with meals  carvedilol 12.5 milliGRAM(s) Oral every 12 hours  enoxaparin Injectable 30 milliGRAM(s) SubCutaneous every 24 hours  epoetin ignacia (EPOGEN) Injectable 55400 Unit(s) IV Push <User Schedule>  ferrous    sulfate 325 milliGRAM(s) Oral every 8 hours  folic acid 1 milliGRAM(s) Oral daily  insulin lispro (ADMELOG) corrective regimen sliding scale   SubCutaneous three times a day before meals  insulin lispro (ADMELOG) corrective regimen sliding scale   SubCutaneous at bedtime  mupirocin 2% Nasal 1 Application(s) Both Nostrils two times a day  pantoprazole    Tablet 40 milliGRAM(s) Oral before breakfast  sodium chloride 0.9% lock flush 3 milliLiter(s) IV Push every 8 hours  traZODone 100 milliGRAM(s) Oral at bedtime    MEDICATIONS  (PRN):        CAPILLARY BLOOD GLUCOSE      POCT Blood Glucose.: 130 mg/dL (27 Dec 2024 18:43)  POCT Blood Glucose.: 163 mg/dL (27 Dec 2024 11:47)  POCT Blood Glucose.: 116 mg/dL (27 Dec 2024 07:21)  POCT Blood Glucose.: 183 mg/dL (26 Dec 2024 21:28)    I&O's Summary    26 Dec 2024 07:01  -  27 Dec 2024 07:00  --------------------------------------------------------  IN: 900 mL / OUT: 0 mL / NET: 900 mL    27 Dec 2024 07:01  -  27 Dec 2024 21:15  --------------------------------------------------------  IN: 1680 mL / OUT: 2100 mL / NET: -420 mL        T(C): 36.7 (12-27-24 @ 20:24), Max: 36.8 (12-27-24 @ 11:00)  HR: 70 (12-27-24 @ 20:24) (63 - 75)  BP: 174/70 (12-27-24 @ 20:24) (141/63 - 174/70)  RR: 18 (12-27-24 @ 20:24) (18 - 18)  SpO2: 97% (12-27-24 @ 20:24) (97% - 100%)    PHYSICAL EXAM:  GENERAL: NAD  NECK: Supple, No JVD  CHEST/LUNG: Clear to auscultation bilaterally; No wheezing.  HEART: Regular rate and rhythm; No murmurs, rubs, or gallops  ABDOMEN: Soft, Nontender, Nondistended; Bowel sounds present  EXTREMITIES:   No edema  NEUROLOGY: AAO X 3      LABS:                        8.4    7.01  )-----------( 206      ( 27 Dec 2024 08:51 )             27.1     12-27    136  |  98  |  90[H]  ----------------------------<  101[H]  5.9[H]   |  23  |  9.21[H]    Ca    9.0      27 Dec 2024 08:52  Phos  5.3     12-27  Mg     2.1     12-27    TPro  7.1  /  Alb  3.5  /  TBili  0.4  /  DBili  x   /  AST  10  /  ALT  8[L]  /  AlkPhos  109  12-26          Urinalysis Basic - ( 27 Dec 2024 08:52 )    Color: x / Appearance: x / SG: x / pH: x  Gluc: 101 mg/dL / Ketone: x  / Bili: x / Urobili: x   Blood: x / Protein: x / Nitrite: x   Leuk Esterase: x / RBC: x / WBC x   Sq Epi: x / Non Sq Epi: x / Bacteria: x      CAPILLARY BLOOD GLUCOSE      POCT Blood Glucose.: 130 mg/dL (27 Dec 2024 18:43)  POCT Blood Glucose.: 163 mg/dL (27 Dec 2024 11:47)  POCT Blood Glucose.: 116 mg/dL (27 Dec 2024 07:21)  POCT Blood Glucose.: 183 mg/dL (26 Dec 2024 21:28)        RADIOLOGY & ADDITIONAL TESTS:    Imaging Personally Reviewed:    Consultant(s) Notes Reviewed:      Care Discussed with Consultants/Other Providers:    Donaldo Ferro MD, CMD, FACP    [address] [place] [address]  Office Tel: [phone]  Cell: [phone]

## 2024-12-28 NOTE — PROGRESS NOTE ADULT - SUBJECTIVE AND OBJECTIVE BOX
VITAL SIGNS    Telemetry:    Vital Signs Last 24 Hrs  T(C): 36.7 (24 @ 20:24), Max: 36.8 (24 @ 11:00)  T(F): 98 (24 @ 20:24), Max: 98.2 (24 @ 11:00)  HR: 70 (24 @ 20:24) (63 - 75)  BP: 174/70 (24 @ 20:24) (149/74 - 174/70)  RR: 18 (24 @ 20:24) (18 - 18)  SpO2: 97% (24 @ 20:24) (97% - 100%)             @ 07:  -   @ 07:00  --------------------------------------------------------  IN: 1680 mL / OUT: 2100 mL / NET: -420 mL       Daily     Daily Weight in k.7 (27 Dec 2024 18:20)  Admit Wt: Drug Dosing Weight  Height (cm): 180.3 (24 Dec 2024 04:16)  Weight (kg): 83.6 (24 Dec 2024 04:16)  BMI (kg/m2): 25.7 (24 Dec 2024 04:16)  BSA (m2): 2.04 (24 Dec 2024 04:16)      CAPILLARY BLOOD GLUCOSE      POCT Blood Glucose.: 161 mg/dL (27 Dec 2024 21:19)  POCT Blood Glucose.: 130 mg/dL (27 Dec 2024 18:43)  POCT Blood Glucose.: 163 mg/dL (27 Dec 2024 11:47)  POCT Blood Glucose.: 116 mg/dL (27 Dec 2024 07:21)          LABS:     @ 07:01  -   @ 07:00  --------------------------------------------------------  IN: 1680 mL / OUT: 2100 mL / NET: -420 mL    cret                        8.4    7.01  )-----------( 206      ( 27 Dec 2024 08:51 )             27.1         136  |  98  |  90[H]  ----------------------------<  101[H]  5.9[H]   |  23  |  9.21[H]    Ca    9.0      27 Dec 2024 08:52  Phos  5.3       Mg     2.1         TPro  7.1  /  Alb  3.5  /  TBili  0.4  /  DBili  x   /  AST  10  /  ALT  8[L]  /  AlkPhos  109          amLODIPine   Tablet 10 milliGRAM(s) Oral at bedtime  ascorbic acid 500 milliGRAM(s) Oral daily  aspirin enteric coated 81 milliGRAM(s) Oral daily  atorvastatin 80 milliGRAM(s) Oral at bedtime  calcium acetate 667 milliGRAM(s) Oral three times a day with meals  carvedilol 12.5 milliGRAM(s) Oral every 12 hours  enoxaparin Injectable 30 milliGRAM(s) SubCutaneous every 24 hours  epoetin ignacia (EPOGEN) Injectable 76579 Unit(s) IV Push <User Schedule>  ferrous    sulfate 325 milliGRAM(s) Oral every 8 hours  folic acid 1 milliGRAM(s) Oral daily  insulin lispro (ADMELOG) corrective regimen sliding scale   SubCutaneous three times a day before meals  insulin lispro (ADMELOG) corrective regimen sliding scale   SubCutaneous at bedtime  mupirocin 2% Nasal 1 Application(s) Both Nostrils two times a day  pantoprazole    Tablet 40 milliGRAM(s) Oral before breakfast  sodium chloride 0.9% lock flush 3 milliLiter(s) IV Push every 8 hours  traZODone 100 milliGRAM(s) Oral at bedtime      PHYSICAL EXAM    Subjective: "Hi.   Neurology: alert and oriented x 3, nonfocal, no gross deficits  CV : tele:  RSR  Sternal Wound :  CDI with dressing , Stable  Lungs: clear. RR easy, unlabored   Abdomen: soft, nontender, nondistended, positive bowel sounds, bowel movement   Neg N/V/D   :  pt voiding without difficulty   Extremities:   ANAYA; edema, neg calf tenderness.   PPP bilaterally      PW:  Chest tubes:                 VITAL SIGNS    Telemetry:  rsr 60-80  Vital Signs Last 24 Hrs  T(C): 36.7 (24 @ 20:24), Max: 36.8 (24 @ 11:00)  T(F): 98 (24 @ 20:24), Max: 98.2 (24 @ 11:00)  HR: 70 (24 @ 20:24) (63 - 75)  BP: 174/70 (24 @ 20:24) (149/74 - 174/70)  RR: 18 (24 @ 20:24) (18 - 18)  SpO2: 97% (24 @ 20:24) (97% - 100%)             @ 07:01  -   @ 07:00  --------------------------------------------------------  IN: 1680 mL / OUT: 2100 mL / NET: -420 mL       Daily     Daily Weight in k.7 (27 Dec 2024 18:20)  Admit Wt: Drug Dosing Weight  Height (cm): 180.3 (24 Dec 2024 04:16)  Weight (kg): 83.6 (24 Dec 2024 04:16)  BMI (kg/m2): 25.7 (24 Dec 2024 04:16)  BSA (m2): 2.04 (24 Dec 2024 04:16)      CAPILLARY BLOOD GLUCOSE      POCT Blood Glucose.: 161 mg/dL (27 Dec 2024 21:19)  POCT Blood Glucose.: 130 mg/dL (27 Dec 2024 18:43)  POCT Blood Glucose.: 163 mg/dL (27 Dec 2024 11:47)  POCT Blood Glucose.: 116 mg/dL (27 Dec 2024 07:21)          LABS:     @ 07:01  -   @ 07:00  --------------------------------------------------------  IN: 1680 mL / OUT: 2100 mL / NET: -420 mL    cret                        8.4    7.01  )-----------( 206      ( 27 Dec 2024 08:51 )             27.1         136  |  98  |  90[H]  ----------------------------<  101[H]  5.9[H]   |  23  |  9.21[H]    Ca    9.0      27 Dec 2024 08:52  Phos  5.3       Mg     2.1         TPro  7.1  /  Alb  3.5  /  TBili  0.4  /  DBili  x   /  AST  10  /  ALT  8[L]  /  AlkPhos  109          amLODIPine   Tablet 10 milliGRAM(s) Oral at bedtime  ascorbic acid 500 milliGRAM(s) Oral daily  aspirin enteric coated 81 milliGRAM(s) Oral daily  atorvastatin 80 milliGRAM(s) Oral at bedtime  calcium acetate 667 milliGRAM(s) Oral three times a day with meals  carvedilol 12.5 milliGRAM(s) Oral every 12 hours  enoxaparin Injectable 30 milliGRAM(s) SubCutaneous every 24 hours  epoetin ignacia (EPOGEN) Injectable 44946 Unit(s) IV Push <User Schedule>  ferrous    sulfate 325 milliGRAM(s) Oral every 8 hours  folic acid 1 milliGRAM(s) Oral daily  insulin lispro (ADMELOG) corrective regimen sliding scale   SubCutaneous three times a day before meals  insulin lispro (ADMELOG) corrective regimen sliding scale   SubCutaneous at bedtime  mupirocin 2% Nasal 1 Application(s) Both Nostrils two times a day  pantoprazole    Tablet 40 milliGRAM(s) Oral before breakfast  sodium chloride 0.9% lock flush 3 milliLiter(s) IV Push every 8 hours  traZODone 100 milliGRAM(s) Oral at bedtime      PHYSICAL EXAM    Subjective: "My surgery is Monday."   Neurology: alert and oriented x 3, nonfocal, no gross deficits  CV : tele:  RSR 55-80  Lungs: clear. RR easy, unlabored   Abdomen: soft, nontender, nondistended, positive bowel sounds, + bowel movement;  Neg N/V/D   :  HD patient   Extremities:   ANAYA; neg LE edema, neg calf tenderness.   PPP bilaterally; rt radial cath site cdi anahi- neg hematoma/bleeding   + left AVF + bruit/ thrill

## 2024-12-28 NOTE — PROGRESS NOTE ADULT - ASSESSMENT
54M with PMH of HTN, HLD, ESRD on HD MWF via LUE AVF, ascites (requiring repeat paracenteses q4-6wks), NICM with moderate LV dysfunction w/ EF 35-40%(2023) and CAD s/p atherectomy + SALLIE to D1(4/11/2024) presents to [facility] transferred from [facility]. Patient initially presented to [facility] ED with shortness of breath. Of note he was recently admitted from 09/27-12/02 for acute cholecystitis s/p cholecystectomy. In [facility] ED, pt was hypoxic to 86% on RA, trop 1.7K, EKG no new changes, CXR fluid overload. Admitted for AHRF 2/2 fluid overload. Pt received HD on 12/18, 12/19, 12/20 and 12/22. DAPT was resumed, TTE showed improvement in LVEF to 40-45%. Stress test was abnormal with 1mm inferior STD, reversible ischemia in the inferior wall and fixed defects in the lateral, anterior and apical walls with post stress EF of 24%. Pt was then transferred to [facility] for cardiac cath which showed pLAD 70% ISR, mLCx 70%, D1 severe dz. Patient now transferred to [facility] CTS Dr. Rivers for CABG eval.     CAD, multiple vessel:    CTS/Cardio f/up appreciated.  For CABG  ASA/Coreg/Atorvastatin    ESRD:    Nephro f/up appreciated  On HD  On Epogen

## 2024-12-28 NOTE — PROGRESS NOTE ADULT - SUBJECTIVE AND OBJECTIVE BOX
[facility]    Dr. Nica Styles     Office [phone] (9 am to 5 pm)  Service: [phone] (5pm to 9am)  Also Available on TEAMS      RENAL PROGRESS NOTE: DATE OF SERVICE 12-28-24 @ 14:28    Patient is a 54y old  Male who presents with a chief complaint of CAD s/p NSTEMI (28 Dec 2024 07:05)      Patient seen and examined at bedside. No chest pain/sob    VITALS:  T(F): 97.6 (12-28-24 @ 10:58), Max: 98 (12-27-24 @ 20:24)  HR: 58 (12-28-24 @ 10:58)  BP: 159/73 (12-28-24 @ 10:58)  RR: 18 (12-28-24 @ 10:58)  SpO2: 95% (12-28-24 @ 10:58)  Wt(kg): --    12-27 @ 07:01  -  12-28 @ 07:00  --------------------------------------------------------  IN: 1680 mL / OUT: 2100 mL / NET: -420 mL    12-28 @ 07:01  -  12-28 @ 14:28  --------------------------------------------------------  IN: 220 mL / OUT: 0 mL / NET: 220 mL          PHYSICAL EXAM:  Constitutional: NAD  Neck: No JVD  Respiratory: CTAB, no wheezes, rales or rhonchi  Cardiovascular: S1, S2, RRR  Gastrointestinal: BS+, soft, NT/ND  Extremities: No peripheral edema    Hospital Medications:   MEDICATIONS  (STANDING):  amLODIPine   Tablet 10 milliGRAM(s) Oral at bedtime  ascorbic acid 500 milliGRAM(s) Oral daily  aspirin enteric coated 81 milliGRAM(s) Oral daily  atorvastatin 80 milliGRAM(s) Oral at bedtime  calcium acetate 667 milliGRAM(s) Oral three times a day with meals  carvedilol 12.5 milliGRAM(s) Oral every 12 hours  enoxaparin Injectable 30 milliGRAM(s) SubCutaneous every 24 hours  epoetin ignacia (EPOGEN) Injectable 43635 Unit(s) IV Push <User Schedule>  ferrous    sulfate 325 milliGRAM(s) Oral every 8 hours  folic acid 1 milliGRAM(s) Oral daily  insulin lispro (ADMELOG) corrective regimen sliding scale   SubCutaneous three times a day before meals  insulin lispro (ADMELOG) corrective regimen sliding scale   SubCutaneous at bedtime  mupirocin 2% Nasal 1 Application(s) Both Nostrils two times a day  pantoprazole    Tablet 40 milliGRAM(s) Oral before breakfast  sodium chloride 0.9% lock flush 3 milliLiter(s) IV Push every 8 hours  traZODone 100 milliGRAM(s) Oral at bedtime      LABS:  12-28    141  |  100  |  63[H]  ----------------------------<  119[H]  5.1   |  24  |  7.20[H]    Ca    8.9      28 Dec 2024 11:11  Phos  5.3     12-27  Mg     2.1     12-27      Creatinine Trend: 7.20 <--, 9.21 <--, 7.88 <--, 6.47 <--, 7.65 <--, 7.48 <--                                9.8    6.95  )-----------( 223      ( 28 Dec 2024 11:11 )             30.9     Urine Studies:  Urinalysis - [12-28-24 @ 11:11]      Color  / Appearance  / SG  / pH       Gluc 119 / Ketone   / Bili  / Urobili        Blood  / Protein  / Leuk Est  / Nitrite       RBC  / WBC  / Hyaline  / Gran  / Sq Epi  / Non Sq Epi  / Bacteria       Iron 29, TIBC 143, %sat 20      [12-19-24 @ 05:00]  Ferritin 904      [12-19-24 @ 05:00]  TSH 4.75      [12-24-24 @ 06:50]    HBsAg Nonreact      [12-19-24 @ 05:00]      RADIOLOGY & ADDITIONAL STUDIES:

## 2024-12-28 NOTE — PROGRESS NOTE ADULT - PROBLEM SELECTOR PLAN 2
on HD- typical schedule MWF via LUE AVF   HD today with 1 unit prbc  d/w renal- hd sunday with another 1 unit prbc   12/25 k5.2  Nephrology following on HD- typical schedule MWF via LUE AVF   HD today with 1 unit prbc  d/w renal- hd sunday with another 1 unit prbc   Nephrology following

## 2024-12-29 LAB
ANION GAP SERPL CALC-SCNC: 17 MMOL/L — SIGNIFICANT CHANGE UP (ref 5–17)
BLD GP AB SCN SERPL QL: NEGATIVE — SIGNIFICANT CHANGE UP
BUN SERPL-MCNC: 83 MG/DL — HIGH (ref 7–23)
CALCIUM SERPL-MCNC: 8.7 MG/DL — SIGNIFICANT CHANGE UP (ref 8.4–10.5)
CHLORIDE SERPL-SCNC: 100 MMOL/L — SIGNIFICANT CHANGE UP (ref 96–108)
CO2 SERPL-SCNC: 22 MMOL/L — SIGNIFICANT CHANGE UP (ref 22–31)
CREAT SERPL-MCNC: 8.86 MG/DL — HIGH (ref 0.5–1.3)
EGFR: 6 ML/MIN/1.73M2 — LOW
EGFR: 6 ML/MIN/1.73M2 — LOW
GLUCOSE SERPL-MCNC: 117 MG/DL — HIGH (ref 70–99)
HCT VFR BLD CALC: 27.4 % — LOW (ref 39–50)
HGB BLD-MCNC: 8.7 G/DL — LOW (ref 13–17)
MCHC RBC-ENTMCNC: 31.3 PG — SIGNIFICANT CHANGE UP (ref 27–34)
MCV RBC AUTO: 98.6 FL — SIGNIFICANT CHANGE UP (ref 80–100)
NRBC # BLD: 0 /100 WBCS — SIGNIFICANT CHANGE UP (ref 0–0)
NRBC BLD-RTO: 0 /100 WBCS — SIGNIFICANT CHANGE UP (ref 0–0)
PLATELET # BLD AUTO: 204 K/UL — SIGNIFICANT CHANGE UP (ref 150–400)
POTASSIUM SERPL-MCNC: 5.7 MMOL/L — HIGH (ref 3.5–5.3)
POTASSIUM SERPL-SCNC: 5.7 MMOL/L — HIGH (ref 3.5–5.3)
RBC # BLD: 2.78 M/UL — LOW (ref 4.2–5.8)
RBC # FLD: 17.3 % — HIGH (ref 10.3–14.5)
RH IG SCN BLD-IMP: POSITIVE — SIGNIFICANT CHANGE UP
WBC # BLD: 7.27 K/UL — SIGNIFICANT CHANGE UP (ref 3.8–10.5)
WBC # FLD AUTO: 7.27 K/UL — SIGNIFICANT CHANGE UP (ref 3.8–10.5)

## 2024-12-29 RX ORDER — ACETAMINOPHEN 500 MG/5ML
1000 LIQUID (ML) ORAL ONCE
Refills: 0 | Status: COMPLETED | OUTPATIENT
Start: 2024-12-29 | End: 2024-12-30

## 2024-12-29 RX ORDER — ENOXAPARIN SODIUM 100 MG/ML
30 INJECTION SUBCUTANEOUS
Refills: 0 | Status: COMPLETED | OUTPATIENT
Start: 2024-12-29 | End: 2024-12-29

## 2024-12-29 RX ORDER — MUPIROCIN CALCIUM 20 MG/G
1 CREAM TOPICAL
Refills: 0 | Status: DISCONTINUED | OUTPATIENT
Start: 2024-12-29 | End: 2024-12-30

## 2024-12-29 RX ORDER — GABAPENTIN 400 MG/1
300 CAPSULE ORAL ONCE
Refills: 0 | Status: COMPLETED | OUTPATIENT
Start: 2024-12-29 | End: 2024-12-30

## 2024-12-29 RX ORDER — CEFUROXIME SODIUM 1.5 G
1500 VIAL (EA) INJECTION ONCE
Refills: 0 | Status: DISCONTINUED | OUTPATIENT
Start: 2024-12-29 | End: 2024-12-30

## 2024-12-29 RX ORDER — MUPIROCIN CALCIUM 20 MG/G
1 CREAM TOPICAL
Refills: 0 | Status: DISCONTINUED | OUTPATIENT
Start: 2024-12-29 | End: 2024-12-29

## 2024-12-29 RX ADMIN — Medication 1 APPLICATION(S): at 21:45

## 2024-12-29 RX ADMIN — Medication 325 MILLIGRAM(S): at 08:06

## 2024-12-29 RX ADMIN — Medication 81 MILLIGRAM(S): at 15:34

## 2024-12-29 RX ADMIN — Medication 3 MILLILITER(S): at 13:33

## 2024-12-29 RX ADMIN — Medication 100 MILLIGRAM(S): at 21:59

## 2024-12-29 RX ADMIN — CARVEDILOL 12.5 MILLIGRAM(S): 3.12 TABLET, FILM COATED ORAL at 18:26

## 2024-12-29 RX ADMIN — Medication 2000 MILLIGRAM(S): at 21:59

## 2024-12-29 RX ADMIN — Medication 3 MILLILITER(S): at 08:23

## 2024-12-29 RX ADMIN — MUPIROCIN CALCIUM 1 APPLICATION(S): 20 CREAM TOPICAL at 18:26

## 2024-12-29 RX ADMIN — INSULIN LISPRO 1: 100 INJECTION, SOLUTION INTRAVENOUS; SUBCUTANEOUS at 18:45

## 2024-12-29 RX ADMIN — Medication 3 MILLILITER(S): at 22:21

## 2024-12-29 RX ADMIN — ENOXAPARIN SODIUM 30 MILLIGRAM(S): 100 INJECTION SUBCUTANEOUS at 14:57

## 2024-12-29 RX ADMIN — AMLODIPINE BESYLATE 10 MILLIGRAM(S): 10 TABLET ORAL at 22:21

## 2024-12-29 RX ADMIN — ATORVASTATIN CALCIUM 80 MILLIGRAM(S): 80 TABLET, FILM COATED ORAL at 21:59

## 2024-12-29 RX ADMIN — Medication 500 MILLIGRAM(S): at 15:30

## 2024-12-29 RX ADMIN — Medication 325 MILLIGRAM(S): at 21:59

## 2024-12-29 RX ADMIN — EPOETIN ALFA 10000 UNIT(S): 10000 SOLUTION INTRAVENOUS; SUBCUTANEOUS at 12:27

## 2024-12-29 RX ADMIN — FOLIC ACID 1 MILLIGRAM(S): 1 TABLET ORAL at 15:31

## 2024-12-29 RX ADMIN — Medication 40 MILLIGRAM(S): at 08:05

## 2024-12-29 RX ADMIN — CARVEDILOL 12.5 MILLIGRAM(S): 3.12 TABLET, FILM COATED ORAL at 08:05

## 2024-12-29 RX ADMIN — Medication 325 MILLIGRAM(S): at 15:31

## 2024-12-29 RX ADMIN — Medication 667 MILLIGRAM(S): at 08:05

## 2024-12-29 RX ADMIN — Medication 667 MILLIGRAM(S): at 18:26

## 2024-12-29 RX ADMIN — Medication 667 MILLIGRAM(S): at 15:31

## 2024-12-29 RX ADMIN — MUPIROCIN CALCIUM 1 APPLICATION(S): 20 CREAM TOPICAL at 08:23

## 2024-12-29 NOTE — PROGRESS NOTE ADULT - SUBJECTIVE AND OBJECTIVE BOX
Patient is a 54y old  Male who presents with a chief complaint of CAD s/p NSTEMI (29 Dec 2024 07:19)      SUBJECTIVE / OVERNIGHT EVENTS:    Events noted.  CONSTITUTIONAL: No fever,  or fatigue  RESPIRATORY: No cough, wheezing,  No shortness of breath  CARDIOVASCULAR: No chest pain, palpitations, dizziness, or leg swelling  GASTROINTESTINAL: No abdominal or epigastric pain.   NEUROLOGICAL: No headache    MEDICATIONS  (STANDING):  acetaminophen     Tablet .. 1000 milliGRAM(s) Oral once  amLODIPine   Tablet 10 milliGRAM(s) Oral at bedtime  ascorbic acid 2000 milliGRAM(s) Oral once  ascorbic acid 500 milliGRAM(s) Oral daily  aspirin enteric coated 81 milliGRAM(s) Oral daily  atorvastatin 80 milliGRAM(s) Oral at bedtime  calcium acetate 667 milliGRAM(s) Oral three times a day with meals  carvedilol 12.5 milliGRAM(s) Oral every 12 hours  cefuroxime  IVPB 1500 milliGRAM(s) IV Intermittent once  chlorhexidine 0.12% Liquid 30 milliLiter(s) Swish and Spit once  chlorhexidine 4% Liquid 1 Application(s) Topical once  epoetin ignacia (EPOGEN) Injectable 92934 Unit(s) IV Push <User Schedule>  ferrous    sulfate 325 milliGRAM(s) Oral every 8 hours  folic acid 1 milliGRAM(s) Oral daily  gabapentin 300 milliGRAM(s) Oral once  insulin lispro (ADMELOG) corrective regimen sliding scale   SubCutaneous three times a day before meals  insulin lispro (ADMELOG) corrective regimen sliding scale   SubCutaneous at bedtime  mupirocin 2% Ointment 1 Application(s) Both Nostrils two times a day  pantoprazole    Tablet 40 milliGRAM(s) Oral before breakfast  sodium chloride 0.9% lock flush 3 milliLiter(s) IV Push every 8 hours  traZODone 100 milliGRAM(s) Oral at bedtime    MEDICATIONS  (PRN):        CAPILLARY BLOOD GLUCOSE      POCT Blood Glucose.: 141 mg/dL (29 Dec 2024 15:26)  POCT Blood Glucose.: 107 mg/dL (29 Dec 2024 08:02)  POCT Blood Glucose.: 138 mg/dL (28 Dec 2024 21:25)    I&O's Summary    28 Dec 2024 07:01  -  29 Dec 2024 07:00  --------------------------------------------------------  IN: 760 mL / OUT: 0 mL / NET: 760 mL    29 Dec 2024 07:01  -  29 Dec 2024 18:18  --------------------------------------------------------  IN: 0 mL / OUT: 1500 mL / NET: -1500 mL        T(C): 36.8 (12-29-24 @ 14:20), Max: 37.1 (12-28-24 @ 19:33)  HR: 67 (12-29-24 @ 14:20) (66 - 77)  BP: 157/78 (12-29-24 @ 14:20) (143/73 - 172/81)  RR: 18 (12-29-24 @ 14:20) (18 - 18)  SpO2: 96% (12-29-24 @ 14:20) (93% - 96%)    PHYSICAL EXAM:  GENERAL: NAD  NECK: Supple, No JVD  CHEST/LUNG: Clear to auscultation bilaterally; No wheezing.  HEART: Regular rate and rhythm; No murmurs, rubs, or gallops  ABDOMEN: Soft, Nontender, Nondistended; Bowel sounds present  EXTREMITIES:   No edema  NEUROLOGY: AAO X 3      LABS:                        8.7    7.27  )-----------( 204      ( 29 Dec 2024 11:45 )             27.4     12-29    139  |  100  |  83[H]  ----------------------------<  117[H]  5.7[H]   |  22  |  8.86[H]    Ca    8.7      29 Dec 2024 11:45            Urinalysis Basic - ( 29 Dec 2024 11:45 )    Color: x / Appearance: x / SG: x / pH: x  Gluc: 117 mg/dL / Ketone: x  / Bili: x / Urobili: x   Blood: x / Protein: x / Nitrite: x   Leuk Esterase: x / RBC: x / WBC x   Sq Epi: x / Non Sq Epi: x / Bacteria: x      CAPILLARY BLOOD GLUCOSE      POCT Blood Glucose.: 141 mg/dL (29 Dec 2024 15:26)  POCT Blood Glucose.: 107 mg/dL (29 Dec 2024 08:02)  POCT Blood Glucose.: 138 mg/dL (28 Dec 2024 21:25)        RADIOLOGY & ADDITIONAL TESTS:    Imaging Personally Reviewed:    Consultant(s) Notes Reviewed:      Care Discussed with Consultants/Other Providers:    Donaldo Ferro MD, CMD, FACP    [address] [place] [address]  Office Tel: [phone]  Cell: [phone]

## 2024-12-29 NOTE — PROGRESS NOTE ADULT - SUBJECTIVE AND OBJECTIVE BOX
MR#[id number]  PATIENT NAME:RAAD CANO    DATE OF SERVICE: 12-29-24 @ 07:20  Patient was seen and examined by Solo Quick MD on    12-29-24 @ 07:20 .  Interim events noted.Consultant notes ,Labs,Telemetry reviewed by me       HOSPITAL COURSE: HPI:  54M with PMH of HTN, HLD, ESRD on HD MWF via LUE AVF, ascites (requiring repeat paracenteses q4-6wks), NICM with moderate LV dysfunction w/ EF 35-40%(2023) and CAD s/p atherectomy + SALLIE to D1(4/11/2024) presents to [facility] transferred from [facility]. Patient initially presented to [facility] ED with shortness of breath. Of note he was recently admitted from 09/27-12/02 for acute cholecystitis s/p cholecystectomy. In [facility] ED, pt was hypoxic to 86% on RA, trop 1.7K, EKG no new changes, CXR fluid overload. Admitted for AHRF 2/2 fluid overload. Pt received HD on 12/18, 12/19, 12/20 and 12/22. DAPT was resumed, TTE showed improvement in LVEF to 40-45%. Stress test was abnormal with 1mm inferior STD, reversible ischemia in the inferior wall and fixed defects in the lateral, anterior and apical walls with post stress EF of 24%. Pt was then transferred to [facility] for cardiac cath which showed pLAD 70% ISR, mLCx 70%, D1 severe dz. Patient now transferred to [facility] CTS Dr. Rivers for CABG eval. Patient denies any current SOB or chest pain on admission. Otherwise denies headaches, abdominal pain, urinary or bowel changes, fevers, chills, N/V/D, sick contacts.  (24 Dec 2024 05:07)      INTERIM EVENTS:Patient seen at bedside ,interim events noted.  12/23 Cardiac cath  pLAD 70% ISR, mLCx 70%, D1 severe dz.   12/ 25 VSS  CP free   HD via avf  for daily HD pre op- on lovenox 30 qd   12/26 HD today continue with present meds. Plan for CABG possibleTVR next week  12/27- VSS Sinus rhythm   12/28-Sinus rhythm plan for OR on Monday HD tomorrow  12/29-OOB Sinus rhythm no chest pain or dyspnea for HD with PRBC Tx today-CABG/TVR in AM -Dr Karan Rivers      [facility] -reviewed admission note, no change since admission  HEART FAILURE: Acute[x ]Chronic[ ] Systolic[x ] Diastolic[ ] Combined Systolic and Diastolic[ ]  CAD[ x] CABG[ ] PCI[x ]  DEVICES[ ] PPM[ ] ICD[ ] ILR[ ]  ATRIAL FIBRILLATION[ ] Paroxysmal[ ] Permanent[ ] CHADS2-[  ]  GHASSAN[ ] CKD1[ ] CKD2[ ] CKD3[ ] CKD4[ ] ESRD[x ]  COPD[ ] HTN[ ]   DM[ ] Type1[ ] Type 2[ ]   CVA[ ] Paresis[ ]    AMBULATION: Assisted[ ] Cane/walker[ ] Independent[x ]            MEDICATIONS  (STANDING):  acetaminophen     Tablet .. 1000 milliGRAM(s) Oral once  amLODIPine   Tablet 10 milliGRAM(s) Oral at bedtime  ascorbic acid 500 milliGRAM(s) Oral daily  ascorbic acid 2000 milliGRAM(s) Oral once  aspirin enteric coated 81 milliGRAM(s) Oral daily  atorvastatin 80 milliGRAM(s) Oral at bedtime  calcium acetate 667 milliGRAM(s) Oral three times a day with meals  carvedilol 12.5 milliGRAM(s) Oral every 12 hours  cefuroxime  IVPB 1500 milliGRAM(s) IV Intermittent once  chlorhexidine 0.12% Liquid 30 milliLiter(s) Swish and Spit once  chlorhexidine 4% Liquid 1 Application(s) Topical once  enoxaparin Injectable 30 milliGRAM(s) SubCutaneous <User Schedule>  epoetin ignacia (EPOGEN) Injectable 61994 Unit(s) IV Push <User Schedule>  ferrous    sulfate 325 milliGRAM(s) Oral every 8 hours  folic acid 1 milliGRAM(s) Oral daily  gabapentin 300 milliGRAM(s) Oral once  insulin lispro (ADMELOG) corrective regimen sliding scale   SubCutaneous three times a day before meals  insulin lispro (ADMELOG) corrective regimen sliding scale   SubCutaneous at bedtime  mupirocin 2% Nasal 1 Application(s) Both Nostrils two times a day  mupirocin 2% Ointment 1 Application(s) Both Nostrils two times a day  pantoprazole    Tablet 40 milliGRAM(s) Oral before breakfast  sodium chloride 0.9% lock flush 3 milliLiter(s) IV Push every 8 hours  traZODone 100 milliGRAM(s) Oral at bedtime    MEDICATIONS  (PRN):            REVIEW OF SYSTEMS:  Constitutional: [ ] fever, [ ]weight loss,  [x ]fatigue [ ]weight gain  Eyes: [ ] visual changes  Respiratory: [ ]shortness of breath;  [ ] cough, [ ]wheezing, [ ]chills, [ ]hemoptysis  Cardiovascular: [ ] chest pain, [ ]palpitations, [ ]dizziness,  [ ]leg swelling[ ]orthopnea[ ]PND  Gastrointestinal: [ ] abdominal pain, [ ]nausea, [ ]vomiting,  [ ]diarrhea [ ]Constipation [ ]Melena  Genitourinary: [ ] dysuria, [ ] hematuria [ ]Vigil  Neurologic: [ ] headaches [ ] tremors[ ]weakness [ ]Paralysis Right[ ] Left[ ]  Skin: [ ] itching, [ ]burning, [ ] rashes  Endocrine: [ ] heat or cold intolerance  Musculoskeletal: [ ] joint pain or swelling; [ ] muscle, back, or extremity pain  Psychiatric: [ ] depression, [ ]anxiety, [ ]mood swings, or [ ]difficulty sleeping  Hematologic: [ ] easy bruising, [ ] bleeding gums    [ ] All remaining systems negative except as per above.   [ ]Unable to obtain.  [x] No change in ROS since admission      Vital Signs Last 24 Hrs  T(C): 37.1 (28 Dec 2024 19:33), Max: 37.1 (28 Dec 2024 19:33)  T(F): 98.7 (28 Dec 2024 19:33), Max: 98.7 (28 Dec 2024 19:33)  HR: 71 (28 Dec 2024 22:06) (58 - 71)  BP: 172/81 (28 Dec 2024 22:06) (154/79 - 172/83)  BP(mean): 104 (28 Dec 2024 19:33) (101 - 104)  RR: 18 (28 Dec 2024 19:33) (18 - 18)  SpO2: 93% (28 Dec 2024 19:33) (93% - 95%)    Parameters below as of 28 Dec 2024 19:33  Patient On (Oxygen Delivery Method): room air      I&O's Summary    28 Dec 2024 07:01  -  29 Dec 2024 07:00  --------------------------------------------------------  IN: 760 mL / OUT: 0 mL / NET: 760 mL        PHYSICAL EXAM:  General: No acute distress BMI-25.7  HEENT: EOMI, PERRL  Neck: Supple, [ ] JVD  Lungs: Equal air entry bilaterally; [ ] rales [ ] wheezing [ ] rhonchi  Heart: Regular rate and rhythm; [x ] murmur   2/6 [ x] systolic [ ] diastolic [ ] radiation[ ] rubs [ ]  gallops  Abdomen: Nontender, bowel sounds present  Extremities: No clubbing, cyanosis, [ ] edema [ ]Pulses  equal and intact  Nervous system:  Alert & Oriented X3, no focal deficits  Psychiatric: Normal affect  Skin: No rashes or lesions    LABS:  12-28    141  |  100  |  63[H]  ----------------------------<  119[H]  5.1   |  24  |  7.20[H]    Ca    8.9      28 Dec 2024 11:11  Phos  5.3     12-27  Mg     2.1     12-27      Creatinine Trend: 7.20<--, 9.21<--, 7.88<--, 6.47<--, 7.65<--, 7.48<--                        9.8    6.95  )-----------( 223      ( 28 Dec 2024 11:11 )             30.9         TTE W or WO Ultrasound Enhancing Agent (12.24.24 @ 14:25) >  CONCLUSIONS:      1. Left ventricular cavity is severely dilated. Left ventricular wall thickness is normal. Left ventricular systolic function is moderately to severely decreased with an ejection fraction of 35 % by Michaels's method of disks. Global left ventricular hypokinesis. EF 35%   2. Elevated left ventricular filling pressure.   3. Severe tricuspid regurgitation.   4. Moderate-to-severe pulmonary hypertension.        12 Lead ECG (12.25.24 @ 08:39) >   NORMAL SINUS RHYTHM  POSSIBLE LEFT ATRIAL ENLARGEMENT  PULMONARY DISEASE PATTERN  INCOMPLETE RIGHT BUNDLE BRANCH BLOCK  LEFT ANTERIOR FASCICULAR BLOCK  T WAVE ABNORMALITY, CONSIDER LATERAL ISCHEMIA  ABNORMAL ECG        Xray Chest 1 View- PORTABLE-Urgent (Xray Chest 1 View- PORTABLE-Urgent .) (12.24.24 @ 10:20) >  IMPRESSION:  Mild perihilar fullness. Nofocal consolidations.  Interval resolution of bilateral pleural effusions.           Cardiac Catheterization (12.23.24 @ 17:55) >  Diagnostic Conclusions:   Severe in-stent restenosis in LAD/D1 bifurcation. Severe in-stent stenosis in mid Cx. Elevated LVEDP 25 mmHg.

## 2024-12-29 NOTE — PROGRESS NOTE ADULT - ASSESSMENT
54M with PMH of HTN, HLD, ESRD on HD MWF via LUE AVF, ascites (requiring repeat paracenteses q4-6wks), NICM with moderate LV dysfunction w/ EF 35-40%(2023) and CAD s/p atherectomy + SALLIE to D1(4/11/2024) presents to [facility] transferred from [facility]. Patient initially presented to [facility] ED with shortness of breath. Of note he was recently admitted from 09/27-12/02 for acute cholecystitis s/p cholecystectomy. In [facility] ED, pt was hypoxic to 86% on RA, trop 1.7K, EKG no new changes, CXR fluid overload. Admitted for AHRF 2/2 fluid overload. Pt received HD on 12/18, 12/19, 12/20 and 12/22. DAPT was resumed, TTE showed improvement in LVEF to 40-45%. Stress test was abnormal with 1mm inferior STD, reversible ischemia in the inferior wall and fixed defects in the lateral, anterior and apical walls with post stress EF of 24%. Pt was then transferred to [facility] for cardiac cath which showed pLAD 70% ISR, mLCx 70%, D1 severe dz. Patient now transferred to [facility] CTS Dr. Rivers for CABG eval.     CAD, multiple vessel:    CTS/Cardio f/up appreciated.  For CABG tomorrow  ASA/Coreg/Atorvastatin    ESRD:    Nephro f/up appreciated  On HD  On Epogen

## 2024-12-29 NOTE — PROGRESS NOTE ADULT - ASSESSMENT
54M with PMH of HTN, HLD, ESRD on HD MWF via LUE AVF, ascites (requiring repeat paracenteses q4-6wks), NICM with moderate LV dysfunction w/ EF 35-40%(2023) and CAD s/p atherectomy + SALLIE to D1(4/11/2024) presents to [facility] transferred from [facility]. Patient initially presented to [facility] ED with shortness of breath. Of note he was recently admitted from 09/27-12/02 for acute cholecystitis s/p cholecystectomy. In [facility] ED, pt was hypoxic to 86% on RA, trop 1.7K, EKG no new changes, CXR fluid overload. Admitted for AHRF 2/2 fluid overload. Pt received HD on 12/18, 12/19, 12/20 and 12/22. DAPT was resumed, TTE showed improvement in LVEF to 40-45%. Stress test was abnormal with 1mm inferior STD, reversible ischemia in the inferior wall and fixed defects in the lateral, anterior and apical walls with post stress EF of 24%.     Pt was then transferred to [facility] for cardiac cath which showed pLAD 70% ISR, mLCx 70%, D1 severe dz.     Patient now transferred to [facility] CTS Dr. Rivers for CABG eval.    # CAD s/p NSTEMI  Hx CAD s/p PCI LAD 4/24  Presented with ADHF elevated troponins  Positive NST  12/23-Cath- pLAD 70% ISR, mLCx 70%, D1 severe dz.   TTE EF 35% Severe TR    Was on [id number] been off Plavix since 12/23  Plan for CABG/TVR tomorrow Dr Karan Rivers        # Acute on Chronic Systolic Heart Failure  EF-35% 12/24,severe TR  HD today with PRBSC Tx  for fluid offload  GDMT post op    # ESRD  On HD  HD tomorrow    # T2DM  A1c-5.6  RISS        Will follow

## 2024-12-29 NOTE — PROGRESS NOTE ADULT - SUBJECTIVE AND OBJECTIVE BOX
[facility]    Dr. Nica Styles     Office [phone] (9 am to 5 pm)  Service: [phone] (5pm to 9am)  Also Available on TEAMS      RENAL PROGRESS NOTE: DATE OF SERVICE 12-29-24 @ 13:16    Patient is a 54y old  Male who presents with a chief complaint of CAD s/p NSTEMI (29 Dec 2024 07:19)      Patient seen and examined at bedside. No chest pain/sob    VITALS:  T(F): 97.8 (12-29-24 @ 11:20), Max: 98.7 (12-28-24 @ 19:33)  HR: 70 (12-29-24 @ 11:20)  BP: 143/73 (12-29-24 @ 11:20)  RR: 18 (12-29-24 @ 11:20)  SpO2: 96% (12-29-24 @ 11:20)  Wt(kg): --    12-28 @ 07:01  -  12-29 @ 07:00  --------------------------------------------------------  IN: 760 mL / OUT: 0 mL / NET: 760 mL          PHYSICAL EXAM:  Constitutional: NAD  Neck: No JVD  Respiratory: CTAB, no wheezes, rales or rhonchi  Cardiovascular: S1, S2, RRR  Gastrointestinal: BS+, soft, NT/ND  Extremities: No peripheral edema    Hospital Medications:   MEDICATIONS  (STANDING):  acetaminophen     Tablet .. 1000 milliGRAM(s) Oral once  amLODIPine   Tablet 10 milliGRAM(s) Oral at bedtime  ascorbic acid 500 milliGRAM(s) Oral daily  ascorbic acid 2000 milliGRAM(s) Oral once  aspirin enteric coated 81 milliGRAM(s) Oral daily  atorvastatin 80 milliGRAM(s) Oral at bedtime  calcium acetate 667 milliGRAM(s) Oral three times a day with meals  carvedilol 12.5 milliGRAM(s) Oral every 12 hours  cefuroxime  IVPB 1500 milliGRAM(s) IV Intermittent once  chlorhexidine 0.12% Liquid 30 milliLiter(s) Swish and Spit once  chlorhexidine 4% Liquid 1 Application(s) Topical once  enoxaparin Injectable 30 milliGRAM(s) SubCutaneous <User Schedule>  epoetin ignacia (EPOGEN) Injectable 64194 Unit(s) IV Push <User Schedule>  ferrous    sulfate 325 milliGRAM(s) Oral every 8 hours  folic acid 1 milliGRAM(s) Oral daily  gabapentin 300 milliGRAM(s) Oral once  insulin lispro (ADMELOG) corrective regimen sliding scale   SubCutaneous three times a day before meals  insulin lispro (ADMELOG) corrective regimen sliding scale   SubCutaneous at bedtime  mupirocin 2% Ointment 1 Application(s) Both Nostrils two times a day  pantoprazole    Tablet 40 milliGRAM(s) Oral before breakfast  sodium chloride 0.9% lock flush 3 milliLiter(s) IV Push every 8 hours  traZODone 100 milliGRAM(s) Oral at bedtime      LABS:  12-29    139  |  100  |  83[H]  ----------------------------<  117[H]  5.7[H]   |  22  |  8.86[H]    Ca    8.7      29 Dec 2024 11:45      Creatinine Trend: 8.86 <--, 7.20 <--, 9.21 <--, 7.88 <--, 6.47 <--, 7.65 <--                                8.7    7.27  )-----------( 204      ( 29 Dec 2024 11:45 )             27.4     Urine Studies:  Urinalysis - [12-29-24 @ 11:45]      Color  / Appearance  / SG  / pH       Gluc 117 / Ketone   / Bili  / Urobili        Blood  / Protein  / Leuk Est  / Nitrite       RBC  / WBC  / Hyaline  / Gran  / Sq Epi  / Non Sq Epi  / Bacteria       Iron 29, TIBC 143, %sat 20      [12-19-24 @ 05:00]  Ferritin 904      [12-19-24 @ 05:00]  TSH 4.75      [12-24-24 @ 06:50]    HBsAg Nonreact      [12-19-24 @ 05:00]      RADIOLOGY & ADDITIONAL STUDIES:

## 2024-12-29 NOTE — PROGRESS NOTE ADULT - ASSESSMENT
54M with PMH of HTN, HLD, ESRD on HD MWF via LUE AVF, ascites (requiring repeat paracenteses q4-6wks), NICM with moderate LV dysfunction w/ EF 35-40%(2023) and CAD s/p atherectomy + SALLIE to D1(4/11/2024) presents to [facility] transferred from [facility] for CABG eval  Nephro on baKemmerer for HD    ESRD on HD   Regular scheduele MWF   Access LUE AVF\  Center [facility]  Nephrologist Dr. Bailey  Last HD on 12/24  S/p HD on 12/27   HD today per schedule, Next planned for Tuesday, planned for CABG tomm  Keep MAP>65  Renal Diet  Consent in physical chart    Hyperkalemia  Lokelma on non dialysis days    HTN  BP fluctuating   cw home meds    CKD MBD  Can send a PTH  Ca and Phos daily    Anemia  CRISTIANE with HD  Trnasfuse if hgb <7    CAD with multivessel disease  Planned for CABG

## 2024-12-29 NOTE — PRE PROCEDURE NOTE - PRE PROCEDURE EVALUATION
Cardiac Surgery Pre-op Note:    CC: Patient is a 54y old  Male who presents with a chief complaint of CAD s/p NSTEMI  underwent cardiac surghery workup now for CABG  second case   with Dr Rivers                                                                                                           Surgeon: Dr Rivers    Procedure:   CABG 2nd case    Allergies    No Known Allergies    Intolerances        HPI:  54M with PMH of HTN, HLD, ESRD on HD MWF via LUE AVF, ascites (requiring repeat paracenteses q4-6wks), NICM with moderate LV dysfunction w/ EF 35-40%() and CAD s/p atherectomy + SALLIE to D1(2024) presents to [facility] transferred from [facility]. Patient initially presented to [facility] ED with shortness of breath. Of note he was recently admitted from - for acute cholecystitis s/p cholecystectomy. In [facility] ED, pt was hypoxic to 86% on RA, trop 1.7K, EKG no new changes, CXR fluid overload. Admitted for AHRF 2/2 fluid overload. Pt received HD on , ,  and . DAPT was resumed, TTE showed improvement in LVEF to 40-45%. Stress test was abnormal with 1mm inferior STD, reversible ischemia in the inferior wall and fixed defects in the lateral, anterior and apical walls with post stress EF of 24%. Pt was then transferred to [facility] for cardiac cath which showed pLAD 70% ISR, mLCx 70%, D1 severe dz. Patient now transferred to [facility] CTS Dr. Rivers for CABG eval. Patient denies any current SOB or chest pain on admission. Otherwise denies headaches, abdominal pain, urinary or bowel changes, fevers, chills, N/V/D, sick contacts.  (24 Dec 2024 05:07)     VSS  CP free   HD via avf  for daily HD pre op- on lovenox 30 qd    HD today continue with present meds. Plan for CABG possibleTVR next week   vss; rsr 55-80; hd today w/ 1 unit prbc; pt to be dialzyed also this  w/ another 1 unit prbc   VSS; RSR 60-80; continue asa/bb/ statin; HD in am - transfuse 1 unit prbc with HD; d/c lovenox after am dose sun    VSS CP free  HD/ PRBCx1 today   OR Monday  second case  with Dr Rivers       PAST MEDICAL & SURGICAL HISTORY:  Type 2 diabetes mellitus  Hypertension  End stage renal disease  started HD 2019 T, Th, Sat   Bone spur  right shoulder- hx of - sx done  Anemia  Injury of right wrist  hx of at age 14  History of hyperkalemia  before HD- K-6.2 - to repeat in am of sx, pt had HD today  S/P arthroscopy of right shoulder  2010History of vascular access device  right chest permacath - 2019  H/O right wrist surgery  tendons repair - at age 14  AV fistula  H/O ventral hernia repair  s/P cholecystectomy  MEDICATIONS  (STANDING):  acetaminophen     Tablet .. 1000 milliGRAM(s) Oral once  amLODIPine   Tablet 10 milliGRAM(s) Oral at bedtime  ascorbic acid 500 milliGRAM(s) Oral daily  ascorbic acid 2000 milliGRAM(s) Oral once  aspirin enteric coated 81 milliGRAM(s) Oral daily  atorvastatin 80 milliGRAM(s) Oral at bedtime  calcium acetate 667 milliGRAM(s) Oral three times a day with meals  carvedilol 12.5 milliGRAM(s) Oral every 12 hours  cefuroxime  IVPB 1500 milliGRAM(s) IV Intermittent once  chlorhexidine 0.12% Liquid 30 milliLiter(s) Swish and Spit once  chlorhexidine 4% Liquid 1 Application(s) Topical once  enoxaparin Injectable 30 milliGRAM(s) SubCutaneous <User Schedule>  epoetin ignacia (EPOGEN) Injectable 79943 Unit(s) IV Push <User Schedule>  ferrous    sulfate 325 milliGRAM(s) Oral every 8 hours  folic acid 1 milliGRAM(s) Oral daily  gabapentin 300 milliGRAM(s) Oral once  insulin lispro (ADMELOG) corrective regimen sliding scale   SubCutaneous three times a day before meals  insulin lispro (ADMELOG) corrective regimen sliding scale   SubCutaneous at bedtime  mupirocin 2% Nasal 1 Application(s) Both Nostrils two times a day  mupirocin 2% Ointment 1 Application(s) Both Nostrils two times a day  pantoprazole    Tablet 40 milliGRAM(s) Oral before breakfast  sodium chloride 0.9% lock flush 3 milliLiter(s) IV Push every 8 hours  traZODone 100 milliGRAM(s) Oral at bedtime    MEDICATIONS  (PRN):    T(C): 37.1 (24 @ 19:33), Max: 37.1 (24 @ 19:33)  T(F): 98.7 (24 @ 19:33), Max: 98.7 (24 @ 19:33)  HR: 71 (24 @ 22:06) (58 - 71)  BP: 172/81 (24 @ 22:06) (154/79 - 172/83)  RR: 18 (24 @ 19:33) (18 - 18)  SpO2: 93% (24 @ 19:33) (93% - 95%)  Daily   180.3 height   Daily Weight in k (28 Dec 2024 07:48)      Labs:                        9.8    6.95  )-----------( 223      ( 28 Dec 2024 11:11 )             30.9         141  |  100  |  63[H]  ----------------------------<  119[H]  5.1   |  24  |  7.20[H]    Ca    8.9      28 Dec 2024 11:11  Phos  5.3       Mg     2.1         Blood Type: ABO Interpretation: AB ( @ 08:41)    HGB A1C: 5.6  Thyroid Panel:  @ 06:50/4.75  1.0/--/--    MRSA: MRSA PCR Result.: NotDetec ( @ 07:15)   / MSSA:   Urinalysis Basic - ( 28 Dec 2024 11:11 )    Color: x / Appearance: x / SG: x / pH: x  Gluc: 119 mg/dL / Ketone: x  / Bili: x / Urobili: x   Blood: x / Protein: x / Nitrite: x   Leuk Esterase: x / RBC: x / WBC x   Sq Epi: x / Non Sq Epi: x / Bacteria: x        CXR:  wnl    EKG:< from: 12 Lead ECG (24 @ 08:39) >    Diagnosis Line NORMAL SINUS RHYTHM  POSSIBLE LEFT ATRIAL ENLARGEMENT  PULMONARY DISEASE PATTERN  INCOMPLETE RIGHT BUNDLE BRANCH BLOCK  LEFT ANTERIOR FASCICULAR BLOCK  T WAVE ABNORMALITY, CONSIDER LATERAL ISCHEMIA  ABNORMAL ECG  NO PREVIOUS ECGS AVAILABLE  Confirmed by MD YOHANA, NAZARIO ([id number]) on 2024 11:22:13 AM    < end of copied text >  < from: TTE W or WO Ultrasound Enhancing Agent (24 @ 14:25) >   1. Left ventricular cavity is severely dilated. Left ventricular wall thickness is normal. Left ventricular systolic function is moderately to severely decreased with an ejection fraction of 35 % by Michaels's method of disks. Global left ventricular hypokinesis.   2. Elevated left ventricular filling pressure.   3. Severe tricuspid regurgitation.   4. Moderate-to-severe pulmonary hypertension.      < end of copied text >      Carotid Duplex:  WNL    PFT's: pending    Cardiac catheterization:< from: Cardiac Catheterization (24 @ 17:55) >  Diagnostic Conclusions:   Severe in-stent restenosis in LAD/D1 bifurcation. Severe in-stent  stenosis in mid Cx. Elevated LVEDP 25 mmHg.    55 yo male with CAD (stents), HFrEF, HTN, ESRD, anemia, and recurrent  ascites (requiring paracentesis)  presented to [facility] with SOB/hypoxia secondary to volume overload.  Elevated TnI. Echo LVEF 40-45% with  moderate diastolic dysfunction, moderate TR, and PASP 55 mmHg. Nuclear  stress test positive with  post-stress LVEF 24%. Patient transferred for cardiac cath.       < end of copied text >          Gen: WN/WD NAD  Neuro: AAOx3, nonfocal  Pulm: CTA B/L  CV: RRR, S1S2  Abd: Protuberant- ascites  , NT, ND +BS  Ext: No edema, + peripheral pulses  LUE AVF       Pt has AICD/PPM [ ] Yes  [x ] No             Brand Name:  Pre-op Beta Blocker ordered within 24 hrs of surgery (CABG ONLY)?  [ ] Yes   Coreg 12.5 bid  Type & Cross  [ x] Yes  [ ] No  NPO after Midnight [x ] Yes  [ ] No  Pre-op ABX ordered, to be taped on chart:  [ ] Yes  [ ] No     Hibiclens/Peridex ordered [ ]x Yes  [ ] No  Intraop on Hold: PRBCs, CXR, EDU [x ]   Consent obtained  [ x] Yes  [ ] No    Plan   HD   PRBCx1    NPO at midnight    Hibiclens  shower tonight and am  peridex rinse and spit in am   Zinacef on call to  OR taped to chart   CABG second case  with Dr Rivers

## 2024-12-30 ENCOUNTER — TRANSCRIPTION ENCOUNTER (OUTPATIENT)
Age: 54
End: 2024-12-30

## 2024-12-30 ENCOUNTER — RESULT REVIEW (OUTPATIENT)
Age: 54
End: 2024-12-30

## 2024-12-30 ENCOUNTER — APPOINTMENT (OUTPATIENT)
Dept: CARDIOTHORACIC SURGERY | Facility: HOSPITAL | Age: 54
End: 2024-12-30

## 2024-12-30 LAB
ALBUMIN SERPL ELPH-MCNC: 3 G/DL — LOW (ref 3.3–5)
ALP SERPL-CCNC: 100 U/L — SIGNIFICANT CHANGE UP (ref 40–120)
ALT FLD-CCNC: 26 U/L — SIGNIFICANT CHANGE UP (ref 10–45)
ANION GAP SERPL CALC-SCNC: 16 MMOL/L — SIGNIFICANT CHANGE UP (ref 5–17)
ANION GAP SERPL CALC-SCNC: 17 MMOL/L — SIGNIFICANT CHANGE UP (ref 5–17)
ANISOCYTOSIS BLD QL: SLIGHT — SIGNIFICANT CHANGE UP
APTT BLD: 32.2 SEC — SIGNIFICANT CHANGE UP (ref 24.5–35.6)
AST SERPL-CCNC: 67 U/L — HIGH (ref 10–40)
BASE EXCESS BLDMV CALC-SCNC: -2.3 MMOL/L — SIGNIFICANT CHANGE UP (ref -3–3)
BASOPHILS # BLD AUTO: 0.1 K/UL — SIGNIFICANT CHANGE UP (ref 0–0.2)
BASOPHILS NFR BLD AUTO: 0.8 % — SIGNIFICANT CHANGE UP (ref 0–2)
BILIRUB SERPL-MCNC: 1.7 MG/DL — HIGH (ref 0.2–1.2)
BUN SERPL-MCNC: 52 MG/DL — HIGH (ref 7–23)
BURR CELLS BLD QL SMEAR: PRESENT — SIGNIFICANT CHANGE UP
CALCIUM SERPL-MCNC: 8.4 MG/DL — SIGNIFICANT CHANGE UP (ref 8.4–10.5)
CALCIUM SERPL-MCNC: 9.1 MG/DL — SIGNIFICANT CHANGE UP (ref 8.4–10.5)
CHLORIDE SERPL-SCNC: 103 MMOL/L — SIGNIFICANT CHANGE UP (ref 96–108)
CHLORIDE SERPL-SCNC: 109 MMOL/L — HIGH (ref 96–108)
CO2 BLDMV-SCNC: 24 MMOL/L — SIGNIFICANT CHANGE UP (ref 21–29)
CO2 SERPL-SCNC: 19 MMOL/L — LOW (ref 22–31)
CO2 SERPL-SCNC: 22 MMOL/L — SIGNIFICANT CHANGE UP (ref 22–31)
CREAT SERPL-MCNC: 5.24 MG/DL — HIGH (ref 0.5–1.3)
CREAT SERPL-MCNC: 6.71 MG/DL — HIGH (ref 0.5–1.3)
EGFR: 12 ML/MIN/1.73M2 — LOW
EGFR: 12 ML/MIN/1.73M2 — LOW
EGFR: 9 ML/MIN/1.73M2 — LOW
EGFR: 9 ML/MIN/1.73M2 — LOW
EOSINOPHIL # BLD AUTO: 0.1 K/UL — SIGNIFICANT CHANGE UP (ref 0–0.5)
EOSINOPHIL NFR BLD AUTO: 0.8 % — SIGNIFICANT CHANGE UP (ref 0–6)
FIBRINOGEN PPP-MCNC: 240 MG/DL — SIGNIFICANT CHANGE UP (ref 200–445)
GAS PNL BLDA: SIGNIFICANT CHANGE UP
GAS PNL BLDMV: SIGNIFICANT CHANGE UP
GAS PNL BLDV: SIGNIFICANT CHANGE UP
GLUCOSE SERPL-MCNC: 104 MG/DL — HIGH (ref 70–99)
GLUCOSE SERPL-MCNC: 153 MG/DL — HIGH (ref 70–99)
HCO3 BLDMV-SCNC: 22 MMOL/L — SIGNIFICANT CHANGE UP (ref 20–28)
HCT VFR BLD CALC: 28.3 % — LOW (ref 39–50)
HCT VFR BLD CALC: 34.3 % — LOW (ref 39–50)
HEPARINASE TEG R TIME: 7.4 MIN — SIGNIFICANT CHANGE UP (ref 4.3–8.3)
HGB BLD-MCNC: 10.7 G/DL — LOW (ref 13–17)
HGB BLD-MCNC: 9.3 G/DL — LOW (ref 13–17)
HOROWITZ INDEX BLDMV+IHG-RTO: 100 — SIGNIFICANT CHANGE UP
INR BLD: 1.46 RATIO — HIGH (ref 0.85–1.16)
LYMPHOCYTES # BLD AUTO: 0.32 K/UL — LOW (ref 1–3.3)
LYMPHOCYTES # BLD AUTO: 2.6 % — LOW (ref 13–44)
MACROCYTES BLD QL: SLIGHT — SIGNIFICANT CHANGE UP
MAGNESIUM SERPL-MCNC: 2.6 MG/DL — SIGNIFICANT CHANGE UP (ref 1.6–2.6)
MANUAL SMEAR VERIFICATION: SIGNIFICANT CHANGE UP
MCHC RBC-ENTMCNC: 29.6 PG — SIGNIFICANT CHANGE UP (ref 27–34)
MCHC RBC-ENTMCNC: 30.9 PG — SIGNIFICANT CHANGE UP (ref 27–34)
MCHC RBC-ENTMCNC: 31.2 G/DL — LOW (ref 32–36)
MCHC RBC-ENTMCNC: 32.9 G/DL — SIGNIFICANT CHANGE UP (ref 32–36)
MCV RBC AUTO: 94 FL — SIGNIFICANT CHANGE UP (ref 80–100)
MCV RBC AUTO: 95 FL — SIGNIFICANT CHANGE UP (ref 80–100)
MONOCYTES # BLD AUTO: 0.32 K/UL — SIGNIFICANT CHANGE UP (ref 0–0.9)
MONOCYTES NFR BLD AUTO: 2.6 % — SIGNIFICANT CHANGE UP (ref 2–14)
NEUTROPHILS # BLD AUTO: 11.44 K/UL — HIGH (ref 1.8–7.4)
NEUTROPHILS NFR BLD AUTO: 90.6 % — HIGH (ref 43–77)
NEUTS BAND # BLD: 2.6 % — SIGNIFICANT CHANGE UP (ref 0–8)
NEUTS BAND NFR BLD: 2.6 % — SIGNIFICANT CHANGE UP (ref 0–8)
NRBC # BLD: 0 /100 WBCS — SIGNIFICANT CHANGE UP (ref 0–0)
NRBC BLD-RTO: 0 /100 WBCS — SIGNIFICANT CHANGE UP (ref 0–0)
O2 CT VFR BLD CALC: 54 MMHG — SIGNIFICANT CHANGE UP (ref 30–65)
OVALOCYTES BLD QL SMEAR: SLIGHT — SIGNIFICANT CHANGE UP
PCO2 BLDMV: 38 MMHG — SIGNIFICANT CHANGE UP (ref 30–65)
PH BLDMV: 7.38 — SIGNIFICANT CHANGE UP (ref 7.32–7.45)
PHOSPHATE SERPL-MCNC: 3.7 MG/DL — SIGNIFICANT CHANGE UP (ref 2.5–4.5)
PLAT MORPH BLD: NORMAL — SIGNIFICANT CHANGE UP
PLATELET # BLD AUTO: 184 K/UL — SIGNIFICANT CHANGE UP (ref 150–400)
PLATELET # BLD AUTO: 223 K/UL — SIGNIFICANT CHANGE UP (ref 150–400)
PLATELET MAPPING ACTF MAX AMPLITUDE: 17 MM — SIGNIFICANT CHANGE UP (ref 2–19)
PLATELET MAPPING ADP PERCENT INHIBITION: 9.2 % — SIGNIFICANT CHANGE UP (ref 0–17)
PLATELET MAPPING ARACHIDONIC ACID INHIBITION: 48.9 % — HIGH (ref 0–11)
POIKILOCYTOSIS BLD QL AUTO: SLIGHT — SIGNIFICANT CHANGE UP
POLYCHROMASIA BLD QL SMEAR: SLIGHT — SIGNIFICANT CHANGE UP
POTASSIUM SERPL-MCNC: 4.6 MMOL/L — SIGNIFICANT CHANGE UP (ref 3.5–5.3)
POTASSIUM SERPL-MCNC: 5.4 MMOL/L — HIGH (ref 3.5–5.3)
POTASSIUM SERPL-SCNC: 5.4 MMOL/L — HIGH (ref 3.5–5.3)
PROT SERPL-MCNC: 5.6 G/DL — LOW (ref 6–8.3)
PROTHROM AB SERPL-ACNC: 16.6 SEC — HIGH (ref 9.9–13.4)
RAPIDTEG MAXIMUM AMPLITUDE: 65.8 MM — SIGNIFICANT CHANGE UP (ref 52–70)
RBC # BLD: 3.01 M/UL — LOW (ref 4.2–5.8)
RBC # BLD: 3.61 M/UL — LOW (ref 4.2–5.8)
RBC # FLD: 18.9 % — HIGH (ref 10.3–14.5)
RBC # FLD: 19 % — HIGH (ref 10.3–14.5)
RBC BLD AUTO: ABNORMAL
SAO2 % BLDMV: 86.5 — SIGNIFICANT CHANGE UP (ref 60–90)
SODIUM SERPL-SCNC: 141 MMOL/L — SIGNIFICANT CHANGE UP (ref 135–145)
SODIUM SERPL-SCNC: 145 MMOL/L — SIGNIFICANT CHANGE UP (ref 135–145)
TEG FUNCTIONAL FIBRINOGEN: 24.3 MM — SIGNIFICANT CHANGE UP (ref 15–32)
TEG MAXIMUM AMPLITUDE: 65.1 MM — SIGNIFICANT CHANGE UP (ref 52–69)
TEG REACTION TIME: 5.7 MIN — SIGNIFICANT CHANGE UP (ref 4.6–9.1)
WBC # BLD: 12.27 K/UL — HIGH (ref 3.8–10.5)
WBC # BLD: 6.93 K/UL — SIGNIFICANT CHANGE UP (ref 3.8–10.5)
WBC # FLD AUTO: 12.27 K/UL — HIGH (ref 3.8–10.5)
WBC # FLD AUTO: 6.93 K/UL — SIGNIFICANT CHANGE UP (ref 3.8–10.5)

## 2024-12-30 PROCEDURE — 71045 X-RAY EXAM CHEST 1 VIEW: CPT | Mod: 26,76

## 2024-12-30 PROCEDURE — 33518 CABG ARTERY-VEIN TWO: CPT

## 2024-12-30 PROCEDURE — 33533 CABG ARTERIAL SINGLE: CPT

## 2024-12-30 PROCEDURE — 99291 CRITICAL CARE FIRST HOUR: CPT

## 2024-12-30 PROCEDURE — 99292 CRITICAL CARE ADDL 30 MIN: CPT

## 2024-12-30 PROCEDURE — 33508 ENDOSCOPIC VEIN HARVEST: CPT | Mod: 59

## 2024-12-30 PROCEDURE — 94010 BREATHING CAPACITY TEST: CPT | Mod: 26

## 2024-12-30 DEVICE — LIGATING CLIPS WECK HORIZON MEDIUM (BLUE) 24: Type: IMPLANTABLE DEVICE | Status: FUNCTIONAL

## 2024-12-30 DEVICE — SHUNT FLO-THRU INTRALUMINAL1.5MM X 18MM: Type: IMPLANTABLE DEVICE | Status: FUNCTIONAL

## 2024-12-30 DEVICE — BONE WAX 2.5GM: Type: IMPLANTABLE DEVICE | Status: FUNCTIONAL

## 2024-12-30 DEVICE — CANNULA VESSEL 3MM BLUNT TIP CLEAR 1-WAY VALVE: Type: IMPLANTABLE DEVICE | Status: FUNCTIONAL

## 2024-12-30 DEVICE — SHUNT FLO-THRU INTRALUMINAL 1MM X 18MM: Type: IMPLANTABLE DEVICE | Status: FUNCTIONAL

## 2024-12-30 DEVICE — CLIP LIG TI SM/WIDE 24/BX: Type: IMPLANTABLE DEVICE | Status: FUNCTIONAL

## 2024-12-30 DEVICE — KIT CVC 2LUM MAC 9FR CHG: Type: IMPLANTABLE DEVICE | Status: FUNCTIONAL

## 2024-12-30 DEVICE — CANNULA RCC MANUALLY INFLATING W/GWIRE 15FR: Type: IMPLANTABLE DEVICE | Status: FUNCTIONAL

## 2024-12-30 DEVICE — CANNULA ANTEGRADE CARDIOPLEGIA 14 GA STRL: Type: IMPLANTABLE DEVICE | Status: FUNCTIONAL

## 2024-12-30 DEVICE — KIT A-LINE 1LUM 20G X 12CM SAFE KIT: Type: IMPLANTABLE DEVICE | Status: FUNCTIONAL

## 2024-12-30 DEVICE — CANNULA AORTIC PERFUSION EZ GLIDE STRAIGHT 21FR X 35CM NON-VENTED: Type: IMPLANTABLE DEVICE | Status: FUNCTIONAL

## 2024-12-30 DEVICE — PACING WIRE WHITE M-24 BAREWIRE 37MM X 89MM: Type: IMPLANTABLE DEVICE | Status: FUNCTIONAL

## 2024-12-30 DEVICE — CATH THERMDIL SWAN GANZ VIP 7.5FR: Type: IMPLANTABLE DEVICE | Status: FUNCTIONAL

## 2024-12-30 DEVICE — CANNULA VENOUS 2 STAGE LIGHTHOUSE TIP 28-38FR X 1/2" NON-VENTED: Type: IMPLANTABLE DEVICE | Status: FUNCTIONAL

## 2024-12-30 DEVICE — SURGICEL FIBRILLAR 2 X 4": Type: IMPLANTABLE DEVICE | Status: FUNCTIONAL

## 2024-12-30 RX ORDER — PROPOFOL 10 MG/ML
5 INJECTION, EMULSION INTRAVENOUS ONCE
Refills: 0 | Status: COMPLETED | OUTPATIENT
Start: 2024-12-30 | End: 2024-12-30

## 2024-12-30 RX ORDER — DOBUTAMINE 250 MG/20ML
1 INJECTION INTRAVENOUS
Qty: 500 | Refills: 0 | Status: DISCONTINUED | OUTPATIENT
Start: 2024-12-30 | End: 2025-01-01

## 2024-12-30 RX ORDER — PROPOFOL 10 MG/ML
10 INJECTION, EMULSION INTRAVENOUS
Qty: 500 | Refills: 0 | Status: DISCONTINUED | OUTPATIENT
Start: 2024-12-30 | End: 2024-12-31

## 2024-12-30 RX ORDER — CEFUROXIME SODIUM 1.5 G
1500 VIAL (EA) INJECTION EVERY 24 HOURS
Refills: 0 | Status: COMPLETED | OUTPATIENT
Start: 2024-12-31 | End: 2025-01-01

## 2024-12-30 RX ORDER — HYDROMORPHONE/SOD CHLOR,ISO/PF 2 MG/10 ML
0.5 SYRINGE (ML) INJECTION EVERY 6 HOURS
Refills: 0 | Status: DISCONTINUED | OUTPATIENT
Start: 2024-12-30 | End: 2025-01-01

## 2024-12-30 RX ORDER — OXYCODONE HYDROCHLORIDE 30 MG/1
10 TABLET ORAL EVERY 4 HOURS
Refills: 0 | Status: DISCONTINUED | OUTPATIENT
Start: 2024-12-30 | End: 2025-01-05

## 2024-12-30 RX ORDER — SENNA 187 MG
2 TABLET ORAL AT BEDTIME
Refills: 0 | Status: DISCONTINUED | OUTPATIENT
Start: 2024-12-31 | End: 2025-01-12

## 2024-12-30 RX ORDER — POLYETHYLENE GLYCOL 3350 17 G/17G
17 POWDER, FOR SOLUTION ORAL DAILY
Refills: 0 | Status: DISCONTINUED | OUTPATIENT
Start: 2024-12-31 | End: 2025-01-12

## 2024-12-30 RX ORDER — DEXMEDETOMIDINE HYDROCHLORIDE IN SODIUM CHLORIDE 4 UG/ML
0.5 INJECTION INTRAVENOUS
Qty: 200 | Refills: 0 | Status: DISCONTINUED | OUTPATIENT
Start: 2024-12-30 | End: 2025-01-01

## 2024-12-30 RX ORDER — METOCLOPRAMIDE HCL 10 MG
5 TABLET ORAL EVERY 8 HOURS
Refills: 0 | Status: COMPLETED | OUTPATIENT
Start: 2024-12-30 | End: 2025-01-01

## 2024-12-30 RX ORDER — GABAPENTIN 400 MG/1
100 CAPSULE ORAL EVERY 8 HOURS
Refills: 0 | Status: COMPLETED | OUTPATIENT
Start: 2024-12-30 | End: 2025-01-04

## 2024-12-30 RX ORDER — BISACODYL 5 MG
10 TABLET, DELAYED RELEASE (ENTERIC COATED) ORAL ONCE
Refills: 0 | Status: DISCONTINUED | OUTPATIENT
Start: 2025-01-01 | End: 2025-01-29

## 2024-12-30 RX ORDER — OXYCODONE HYDROCHLORIDE 30 MG/1
5 TABLET ORAL EVERY 4 HOURS
Refills: 0 | Status: DISCONTINUED | OUTPATIENT
Start: 2024-12-30 | End: 2025-01-05

## 2024-12-30 RX ORDER — ACETAMINOPHEN 500 MG/5ML
650 LIQUID (ML) ORAL EVERY 6 HOURS
Refills: 0 | Status: COMPLETED | OUTPATIENT
Start: 2024-12-30 | End: 2025-01-02

## 2024-12-30 RX ORDER — MILRINONE LACTATE 1 MG/ML
0.2 INJECTION, SOLUTION INTRAVENOUS
Qty: 20 | Refills: 0 | Status: DISCONTINUED | OUTPATIENT
Start: 2024-12-30 | End: 2024-12-31

## 2024-12-30 RX ORDER — ALBUMIN (HUMAN) 12.5 G/50ML
250 INJECTION, SOLUTION INTRAVENOUS ONCE
Refills: 0 | Status: COMPLETED | OUTPATIENT
Start: 2024-12-30 | End: 2024-12-30

## 2024-12-30 RX ORDER — AMIODARONE HYDROCHLORIDE 50 MG/ML
400 INJECTION, SOLUTION INTRAVENOUS
Refills: 0 | Status: DISCONTINUED | OUTPATIENT
Start: 2024-12-30 | End: 2025-01-01

## 2024-12-30 RX ORDER — DEXTROSE 50 % IN WATER 50 %
50 SYRINGE (ML) INTRAVENOUS
Refills: 0 | Status: DISCONTINUED | OUTPATIENT
Start: 2024-12-30 | End: 2025-03-19

## 2024-12-30 RX ORDER — DEXTROSE 50 % IN WATER 50 %
25 SYRINGE (ML) INTRAVENOUS
Refills: 0 | Status: DISCONTINUED | OUTPATIENT
Start: 2024-12-30 | End: 2025-01-17

## 2024-12-30 RX ORDER — ACETAMINOPHEN 500 MG/5ML
650 LIQUID (ML) ORAL EVERY 6 HOURS
Refills: 0 | Status: DISCONTINUED | OUTPATIENT
Start: 2025-01-02 | End: 2025-01-05

## 2024-12-30 RX ADMIN — Medication 1000 MILLIGRAM(S): at 08:32

## 2024-12-30 RX ADMIN — ALBUMIN (HUMAN) 125 MILLILITER(S): 12.5 INJECTION, SOLUTION INTRAVENOUS at 20:30

## 2024-12-30 RX ADMIN — MUPIROCIN CALCIUM 1 APPLICATION(S): 20 CREAM TOPICAL at 05:55

## 2024-12-30 RX ADMIN — GABAPENTIN 300 MILLIGRAM(S): 400 CAPSULE ORAL at 08:32

## 2024-12-30 RX ADMIN — Medication 3 MILLILITER(S): at 05:54

## 2024-12-30 RX ADMIN — DOBUTAMINE 7.66 MICROGRAM(S)/KG/MIN: 250 INJECTION INTRAVENOUS at 21:54

## 2024-12-30 RX ADMIN — Medication 30 MILLILITER(S): at 08:32

## 2024-12-30 RX ADMIN — Medication 5 MILLIGRAM(S): at 22:00

## 2024-12-30 RX ADMIN — Medication 40 MILLIGRAM(S): at 05:44

## 2024-12-30 RX ADMIN — Medication 0.5 MILLIGRAM(S): at 21:15

## 2024-12-30 RX ADMIN — CARVEDILOL 12.5 MILLIGRAM(S): 3.12 TABLET, FILM COATED ORAL at 05:44

## 2024-12-30 RX ADMIN — Medication 325 MILLIGRAM(S): at 05:44

## 2024-12-30 RX ADMIN — PROPOFOL 5 MILLIGRAM(S): 10 INJECTION, EMULSION INTRAVENOUS at 19:00

## 2024-12-30 RX ADMIN — DEXMEDETOMIDINE HYDROCHLORIDE IN SODIUM CHLORIDE 10.6 MICROGRAM(S)/KG/HR: 4 INJECTION INTRAVENOUS at 21:54

## 2024-12-30 RX ADMIN — Medication 0.5 MILLIGRAM(S): at 21:00

## 2024-12-30 RX ADMIN — Medication 3 UNIT(S)/HR: at 21:54

## 2024-12-30 NOTE — PROGRESS NOTE ADULT - SUBJECTIVE AND OBJECTIVE BOX
Patient seen and examined at the bedside.    Remains critically ill on continuous ICU monitoring.      Brief Summary:  54M with PMH of HTN, HLD, ESRD on HD MWF via LUE AVF, ascites (requiring repeat paracenteses q4-6wks), NICM with moderate LV dysfunction w/ EF 35-40%() and CAD s/p atherectomy + SALLIE to D1(2024) presents to [facility] transferred from [facility]. [facility] with shortness of breath, acute decompensated heart failure, NSTEMI. Of note he was recently admitted from - for acute cholecystitis s/p cholecystectomy.   Cardiac cath at [facility] - howed pLAD 70% ISR, mLCx 70%, D1 severe dz.  Got HD on .  POD# 0 from CABG x 3    Objective:  Vital Signs Last 24 Hrs  T(C): 36.6 (31 Dec 2024 00:00), Max: 37 (30 Dec 2024 05:38)  T(F): 97.9 (31 Dec 2024 00:00), Max: 98.6 (30 Dec 2024 05:38)  HR: 64 (31 Dec 2024 00:30) (60 - 72)  BP: 175/87 (30 Dec 2024 12:01) (166/76 - 175/87)  BP(mean): 113 (30 Dec 2024 12:01) (113 - 113)  RR: 8 (31 Dec 2024 00:30) (8 - 19)  SpO2: 100% (31 Dec 2024 00:30) (94% - 100%)    Parameters below as of 31 Dec 2024 00:00  Patient On (Oxygen Delivery Method): ventilator    O2 Concentration (%): 40    Mode: AC/ CMV (Assist Control/ Continuous Mandatory Ventilation)  RR (machine): 10  TV (machine): 700  FiO2: 40  PEEP: 5  ITime: 1  MAP: 8  PIP: 28              Physical Exam:   General: Alert and interactive   Neurology: Oriented, following commands  Respiratory: Clear bilaterally   CV: Sinus  Abdominal: Soft, Nontender  Extremities: Warm, well-perfused  -------------------------------------------------------------------------------------------------------------------------------    Labs:                        9.3    12.27 )-----------( 184      ( 30 Dec 2024 19:08 )             28.3     1230    145  |  109[H]  |  52[H]  ----------------------------<  153[H]  5.4[H]   |  19[L]  |  5.24[H]    Ca    8.4      30 Dec 2024 19:08  Phos  3.7     12  Mg     2.6     1230    TPro  5.6[L]  /  Alb  3.0[L]  /  TBili  1.7[H]  /  DBili  x   /  AST  67[H]  /  ALT  26  /  AlkPhos  100  12-30    LIVER FUNCTIONS - ( 30 Dec 2024 19:08 )  Alb: 3.0 g/dL / Pro: 5.6 g/dL / ALK PHOS: 100 U/L / ALT: 26 U/L / AST: 67 U/L / GGT: x           PT/INR - ( 30 Dec 2024 19:08 )   PT: 16.6 sec;   INR: 1.46 ratio         PTT - ( 30 Dec 2024 19:08 )  PTT:32.2 sec  ABG - ( 31 Dec 2024 00:14 )  pH, Arterial: 7.42  pH, Blood: x     /  pCO2: 41    /  pO2: 167   / HCO3: 27    / Base Excess: 1.9   /  SaO2: 98.7              ------------------------------------------------------------------------------------------------------------------------------  Assessment:    CAD with instent restenosis of LAD with D1 and LCx disease s/p CABG x 3 with free LIMA-LAD, vein to diag and Left PL  Left ventricular systolic and diastolic dysfunction  Severe tricuspid regurgitation, pulmonary HTN  Acute postoperative respiratory insufficiency  ESRD on iHD  Hyperkalemia  Acute blood loss anemia      Plan:   ***Neuro***  Postoperative acute pain control with Tylenol, Gabapentin, and prns.    ***Cardiovascular***  At high risk for hemodynamic instability and cardiac arrhythmias.  Trend Lactate   Weaned off pressors for MAP > 65 mm Hg  Continue @ 3, primacor discontinued with hypotension  Monitor chest tube output.      ***Pulmonary***  Postoperative acute respiratory insufficiency  Wean ventilator as tolerated. SBT post Hd, wean to extubate  Monitor ABG, Check CXR  Deep breathing and coughing exercises after extubation  Wean oxygen as able.      ***GI***  NPO for now.  Protonix for stress ulcer prophylaxis   Bowel regimen.    ***Renal***   ESRD on HD - Urgent HD tonight for hyperkalemia, no fluid removal  Vigil catheter for strict I/O measurements- still makes some urine   Replete electrolytes as indicated.      ***ID***  Periop Zinacef    ***Endocrine***  Hyperglycemia- Insulin infusion.    ***Hematology***  Acute blood loss anemia, monitor Chest tube outputs, trend Hct  SCD for DVT prophylaxis          Patient requires continuous monitoring with bedside rhythm monitoring, pulse oximetry monitoring, and continuous central venous and arterial pressure monitoring; and intermittent blood gas analysis. Care plan discussed with the ICU care team.   Patient remains critical, at risk for life threatening decompensation.    I have spent 106 minutes providing critical care management to this patient.     Patient seen and examined at the bedside.    Remains critically ill on continuous ICU monitoring.      Brief Summary:  54M with PMH of HTN, HLD, ESRD on HD MWF via LUE AVF, ascites (requiring repeat paracenteses q4-6wks), NICM with moderate LV dysfunction w/ EF 35-40%() and CAD s/p atherectomy + SALLIE to D1(2024) presents to [facility] transferred from [facility]. Patient initially presented to [facility] ED with shortness of breath, acute decompensated heart failure, NSTEMI. Of note he was recently admitted from - for acute cholecystitis s/p cholecystectomy.   Cardiac cath at [facility] - howed pLAD 70% ISR, mLCx 70%, D1 severe dz.  Got HD on .  POD# 0 from CABG x 3    Objective:  Vital Signs Last 24 Hrs  T(C): 36.6 (31 Dec 2024 00:00), Max: 37 (30 Dec 2024 05:38)  T(F): 97.9 (31 Dec 2024 00:00), Max: 98.6 (30 Dec 2024 05:38)  HR: 64 (31 Dec 2024 00:30) (60 - 72)  BP: 175/87 (30 Dec 2024 12:01) (166/76 - 175/87)  BP(mean): 113 (30 Dec 2024 12:01) (113 - 113)  RR: 8 (31 Dec 2024 00:30) (8 - 19)  SpO2: 100% (31 Dec 2024 00:30) (94% - 100%)    Parameters below as of 31 Dec 2024 00:00  Patient On (Oxygen Delivery Method): ventilator    O2 Concentration (%): 40    Mode: AC/ CMV (Assist Control/ Continuous Mandatory Ventilation)  RR (machine): 10  TV (machine): 700  FiO2: 40  PEEP: 5  ITime: 1  MAP: 8  PIP: 28              Physical Exam:   General: Alert and interactive   Neurology: Oriented, following commands  Respiratory: Clear bilaterally   CV: Sinus  Abdominal: Soft, Nontender  Extremities: Warm, well-perfused  -------------------------------------------------------------------------------------------------------------------------------    Labs:                        9.3    12.27 )-----------( 184      ( 30 Dec 2024 19:08 )             28.3     1230    145  |  109[H]  |  52[H]  ----------------------------<  153[H]  5.4[H]   |  19[L]  |  5.24[H]    Ca    8.4      30 Dec 2024 19:08  Phos  3.7     12  Mg     2.6     1230    TPro  5.6[L]  /  Alb  3.0[L]  /  TBili  1.7[H]  /  DBili  x   /  AST  67[H]  /  ALT  26  /  AlkPhos  100  12-30    LIVER FUNCTIONS - ( 30 Dec 2024 19:08 )  Alb: 3.0 g/dL / Pro: 5.6 g/dL / ALK PHOS: 100 U/L / ALT: 26 U/L / AST: 67 U/L / GGT: x           PT/INR - ( 30 Dec 2024 19:08 )   PT: 16.6 sec;   INR: 1.46 ratio         PTT - ( 30 Dec 2024 19:08 )  PTT:32.2 sec  ABG - ( 31 Dec 2024 00:14 )  pH, Arterial: 7.42  pH, Blood: x     /  pCO2: 41    /  pO2: 167   / HCO3: 27    / Base Excess: 1.9   /  SaO2: 98.7              ------------------------------------------------------------------------------------------------------------------------------  Assessment:    CAD with instent restenosis of LAD with D1 and LCx disease s/p CABG x 3 with free LIMA-LAD, vein to diag and Left PL  Left ventricular systolic and diastolic dysfunction  Severe tricuspid regurgitation, pulmonary HTN  Acute postoperative respiratory insufficiency  ESRD on iHD  Hyperkalemia  Acute blood loss anemia  Stress hyperglycemia      Plan:   ***Neuro***  Postoperative acute pain control with Tylenol, Gabapentin, and prns.    ***Cardiovascular***  At high risk for hemodynamic instability and cardiac arrhythmias.  Trend Lactate   Weaned off pressors for MAP > 65 mm Hg  Continue @ 3, primacor discontinued with hypotension  Monitor chest tube output.      ***Pulmonary***  Postoperative acute respiratory insufficiency  Wean ventilator as tolerated. SBT post Hd, wean to extubate  Monitor ABG, Check CXR  Deep breathing and coughing exercises after extubation  Wean oxygen as able.      ***GI***  NPO for now.  Protonix for stress ulcer prophylaxis   Bowel regimen.    ***Renal***   ESRD on HD - Urgent HD tonight for hyperkalemia, no fluid removal  Vigil catheter for strict I/O measurements- still makes some urine   Replete electrolytes as indicated.      ***ID***  Periop Zinacef    ***Endocrine***  Hyperglycemia- Insulin infusion.    ***Hematology***  Acute blood loss anemia, monitor Chest tube outputs, trend Hct  SCD for DVT prophylaxis          Patient requires continuous monitoring with bedside rhythm monitoring, pulse oximetry monitoring, and continuous central venous and arterial pressure monitoring; and intermittent blood gas analysis. Care plan discussed with the ICU care team.   Patient remains critical, at risk for life threatening decompensation.    I have spent 106 minutes providing critical care management to this patient.

## 2024-12-30 NOTE — PRE-ANESTHESIA EVALUATION ADULT - NSRADCARDRESULTSFT_GEN_ALL_CORE
< from: TTE W or WO Ultrasound Enhancing Agent (12.24.24 @ 14:25) >       CONCLUSIONS:      1. Left ventricular cavity is severely dilated. Left ventricular wall thickness is normal. Left ventricular systolic function is moderately to severely decreased with an ejection fraction of 35 % by Michaels's method of disks. Global left ventricular hypokinesis.   2. Elevated left ventricular filling pressure.   3. Severe tricuspid regurgitation.   4. Moderate-to-severe pulmonary hypertension.    ________________________________________________________________________________________  FINDINGS:     Left Ventricle:  The left ventricular cavity is severely dilated. Left ventricular wall thickness is normal. Left ventricular systolic function is moderately to severely decreased with a calculated ejection fraction of 35 % by the Michaels's biplane method of disks. There is global left ventricular hypokinesis. Elevated left ventricular filling pressure.     Right Ventricle:  The right ventricular cavity is mildly enlarged in size and right ventricular systolic function is probably normal. Tricuspid annular plane systolic excursion (TAPSE) is 1.8 cm (normal >=1.7 cm).     Left Atrium:  The left atrium is severely dilated with an indexed volume of 67.66 ml/m².     Right Atrium:  The right atrium is normal in size with an indexed volume of 33.83 ml/m² and an indexed area of 10.95 cm²/m².     Interatrial Septum:  The interatrial septum appears intact.     Aortic Valve:  There is mild calcification of the aortic valve leaflets. There is mild aortic regurgitation. AI VMax is 3.97 m/s. AI pressure half time is 547 msec. AI slopeis 2.50 m/s².     Mitral Valve:  There is calcification of the mitral valve annulus. There is trace mitral regurgitation.     Tricuspid Valve:  There is severe tricuspid regurgitation. Estimated pulmonary artery systolic pressure is 65 mmHg, consistent with moderate-to-severe pulmonary hypertension.     Pulmonic Valve:  There is mild pulmonic regurgitation.     Aorta:  The aortic root appears normal in size.     Pericardium:  There is a trace pericardial effusion with no echocardiographic evidence of tamponade physiology.     Systemic Veins:  The inferior vena cava is normal in size measuring 2.47 cm in diameter, (normal <2.1cm) with normal inspiratory collapse (normal >50%) consistent with normal right atrial pressure (~3, range 0-5mmHg).    < end of copied text >    < from: Cardiac Catheterization (12.23.24 @ 17:55) >    Diagnostic Conclusions:   Severe in-stent restenosis in LAD/D1 bifurcation. Severe in-stent  stenosis in mid Cx. Elevated LVEDP 25 mmHg.      < end of copied text >

## 2024-12-30 NOTE — PRE-ANESTHESIA EVALUATION ADULT - HEIGHT IN INCHES
Patient states that we cannot send Rx's to [facility] pharmacy.  Rx's sent to [address] [facility]
11
11

## 2024-12-30 NOTE — PROGRESS NOTE ADULT - ASSESSMENT
54M with PMH of HTN, HLD, ESRD on HD MWF via LUE AVF, ascites (requiring repeat paracenteses q4-6wks), NICM with moderate LV dysfunction w/ EF 35-40%(2023) and CAD s/p atherectomy + SALLIE to D1(4/11/2024) presents to [facility] transferred from [facility]. Patient initially presented to [facility] ED with shortness of breath. Of note he was recently admitted from 09/27-12/02 for acute cholecystitis s/p cholecystectomy. In [facility] ED, pt was hypoxic to 86% on RA, trop 1.7K, EKG no new changes, CXR fluid overload. Admitted for AHRF 2/2 fluid overload. Pt received HD on 12/18, 12/19, 12/20 and 12/22. DAPT was resumed, TTE showed improvement in LVEF to 40-45%. Stress test was abnormal with 1mm inferior STD, reversible ischemia in the inferior wall and fixed defects in the lateral, anterior and apical walls with post stress EF of 24%.     Pt was then transferred to [facility] for cardiac cath which showed pLAD 70% ISR, mLCx 70%, D1 severe dz.     Patient now transferred to [facility] CTS Dr. Rivers for CABG eval.    # CAD s/p NSTEMI  Hx CAD s/p PCI LAD 4/24  Presented with ADHF elevated troponins  Positive NST  12/23-Cath- pLAD 70% ISR, mLCx 70%, D1 severe dz.   TTE EF 35% Severe TR    Was on [id number] been off Plavix since 12/23  Plan for CABG/TVR today with  Dr Karan Rivers        # Acute on Chronic Systolic Heart Failure  EF-35% 12/24,severe TR  HD today with PRBSC Tx  for fluid offload  GDMT post op      # ESRD  On HD  Last HD 12/29      # T2DM  A1c-5.6  RISS        Will follow

## 2024-12-30 NOTE — PRE-ANESTHESIA EVALUATION ADULT - NSANTHPROCED_GEN_ALL_CORE
Arterial Catheter/Central Venous Catheter/Transesophageal Echocardiogram
Arterial Catheter/Central Venous Catheter/Pulmonary Artery Catheter/Transesophageal Echocardiogram

## 2024-12-30 NOTE — PRE-ANESTHESIA EVALUATION ADULT - NSANTHPMHFT_GEN_ALL_CORE
54M with PMH of HTN, HLD, ESRD on HD MWF via LUE AVF, ascites (requiring repeat paracenteses q4-6wks), NICM with moderate LV dysfunction w/ EF 35-40%(2023) and CAD s/p atherectomy + SALLIE to D1(4/11/2024) presents to [facility] transferred from [facility]. Patient initially presented to [facility] ED with shortness of breath. Of note he was recently admitted from 09/27-12/02 for acute cholecystitis s/p cholecystectomy. In [facility] ED, pt was hypoxic to 86% on RA, trop 1.7K, EKG no new changes, CXR fluid overload. Admitted for AHRF 2/2 fluid overload. Pt received HD on 12/18, 12/19, 12/20 and 12/22. DAPT was resumed, TTE showed improvement in LVEF to 40-45%. Stress test was abnormal with 1mm inferior STD, reversible ischemia in the inferior wall and fixed defects in the lateral, anterior and apical walls with post stress EF of 24%.     Pt was then transferred to [facility] for cardiac cath which showed pLAD 70% ISR, mLCx 70%, D1 severe dz.
54M PMH CAD s/p stent, HTN, ESRD (last HD yesterday, makes some urine).  Reduced LVSF, TVD, TR present.

## 2024-12-30 NOTE — PROGRESS NOTE ADULT - SUBJECTIVE AND OBJECTIVE BOX
[facility]    Dr. Nica Styles     Office [phone] (9 am to 5 pm)  Service: [phone] (5pm to 9am)  Also Available on TEAMS      RENAL PROGRESS NOTE: DATE OF SERVICE 12-30-24 @ 10:20    Patient is a 54y old  Male who presents with a chief complaint of CAD s/p NSTEMI (30 Dec 2024 06:54)      Patient seen and examined at bedside. No chest pain/sob    VITALS:  T(F): 97.9 (12-30-24 @ 08:52), Max: 98.6 (12-30-24 @ 05:38)  HR: 72 (12-30-24 @ 10:06)  BP: 175/87 (12-30-24 @ 10:06)  RR: 18 (12-30-24 @ 10:06)  SpO2: 97% (12-30-24 @ 10:06)  Wt(kg): --    12-29 @ 07:01  -  12-30 @ 07:00  --------------------------------------------------------  IN: 50 mL / OUT: 1500 mL / NET: -1450 mL      Height (cm): 180.3 (12-30 @ 10:06)  Weight (kg): 85.1 (12-30 @ 10:06)  BMI (kg/m2): 26.2 (12-30 @ 10:06)  BSA (m2): 2.05 (12-30 @ 10:06)    PHYSICAL EXAM:  Constitutional: NAD  Neck: No JVD  Respiratory: CTAB, no wheezes, rales or rhonchi  Cardiovascular: S1, S2, RRR  Gastrointestinal: BS+, soft, NT/ND  Extremities: No peripheral edema    Hospital Medications:   MEDICATIONS  (STANDING):  amLODIPine   Tablet 10 milliGRAM(s) Oral at bedtime  ascorbic acid 500 milliGRAM(s) Oral daily  aspirin enteric coated 81 milliGRAM(s) Oral daily  atorvastatin 80 milliGRAM(s) Oral at bedtime  calcium acetate 667 milliGRAM(s) Oral three times a day with meals  carvedilol 12.5 milliGRAM(s) Oral every 12 hours  cefuroxime  IVPB 1500 milliGRAM(s) IV Intermittent once  epoetin ignacia (EPOGEN) Injectable 24565 Unit(s) IV Push <User Schedule>  ferrous    sulfate 325 milliGRAM(s) Oral every 8 hours  folic acid 1 milliGRAM(s) Oral daily  insulin lispro (ADMELOG) corrective regimen sliding scale   SubCutaneous three times a day before meals  insulin lispro (ADMELOG) corrective regimen sliding scale   SubCutaneous at bedtime  mupirocin 2% Ointment 1 Application(s) Both Nostrils two times a day  pantoprazole    Tablet 40 milliGRAM(s) Oral before breakfast  sodium chloride 0.9% lock flush 3 milliLiter(s) IV Push every 8 hours  traZODone 100 milliGRAM(s) Oral at bedtime      LABS:  12-30    141  |  103  |  55[H]  ----------------------------<  104[H]  4.6   |  22  |  6.71[H]    Ca    9.1      30 Dec 2024 06:40      Creatinine Trend: 6.71 <--, 8.86 <--, 7.20 <--, 9.21 <--, 7.88 <--, 6.47 <--, 7.65 <--                                10.7   6.93  )-----------( 223      ( 30 Dec 2024 06:40 )             34.3     Urine Studies:  Urinalysis - [12-30-24 @ 06:40]      Color  / Appearance  / SG  / pH       Gluc 104 / Ketone   / Bili  / Urobili        Blood  / Protein  / Leuk Est  / Nitrite       RBC  / WBC  / Hyaline  / Gran  / Sq Epi  / Non Sq Epi  / Bacteria       Iron 29, TIBC 143, %sat 20      [12-19-24 @ 05:00]  Ferritin 904      [12-19-24 @ 05:00]  TSH 4.75      [12-24-24 @ 06:50]    HBsAg Nonreact      [12-19-24 @ 05:00]      RADIOLOGY & ADDITIONAL STUDIES:

## 2024-12-30 NOTE — PROGRESS NOTE ADULT - ASSESSMENT
54M with PMH of HTN, HLD, ESRD on HD MWF via LUE AVF, ascites (requiring repeat paracenteses q4-6wks), NICM with moderate LV dysfunction w/ EF 35-40%(2023) and CAD s/p atherectomy + SALLIE to D1(4/11/2024) presents to [facility] transferred from [facility] for CABG eval  Nephro on [facility] for HD    ESRD on HD   Regular scheduele MWF   Access LUE AVF\  Center [facility]  Nephrologist Dr. Bailey  Last HD on 12/24  S/p HD on 12/27   HD tomm as per holiday scheduele  Keep MAP>65  Renal Diet  Consent in physical chart    Hyperkalemia  Lokelma on non dialysis days    HTN  BP fluctuating   cw home meds    CKD MBD  Can send a PTH  Ca and Phos daily    Anemia  CRISTIANE with HD  Trnasfuse if hgb <7    CAD with multivessel disease  Planned for CABG today 54M with PMH of HTN, HLD, ESRD on HD MWF via LUE AVF, ascites (requiring repeat paracenteses q4-6wks), NICM with moderate LV dysfunction w/ EF 35-40%(2023) and CAD s/p atherectomy + SALLIE to D1(4/11/2024) presents to [facility] transferred from [facility] for CABG eval  Nephro on [facility] for HD    ESRD on HD   Regular scheduele MWF   Access LUE AVF  Center [facility]  Nephrologist Dr. Bailey  Last HD on 12/24  S/p HD on 12/27 12/29  HD tomm as per holiday schedule  Keep MAP>65  Renal Diet  Consent in physical chart    Hyperkalemia  Lokelma on non dialysis days    HTN  BP fluctuating   cw home meds    CKD MBD  Can send a PTH  Ca and Phos daily    Anemia  CRISTIANE with HD  Trnasfuse if hgb <7    CAD with multivessel disease  Planned for CABG today

## 2024-12-30 NOTE — PROGRESS NOTE ADULT - SUBJECTIVE AND OBJECTIVE BOX
MR#[id number]  PATIENT NAME:RAAD CANO    DATE OF SERVICE: 12-30-24 @ 06:55  Patient was seen and examined by Solo Quick MD on    12-30-24 @ 06:55 .  Interim events noted.Consultant notes ,Labs,Telemetry reviewed by me       HOSPITAL COURSE: HPI:  54M with PMH of HTN, HLD, ESRD on HD MWF via LUE AVF, ascites (requiring repeat paracenteses q4-6wks), NICM with moderate LV dysfunction w/ EF 35-40%(2023) and CAD s/p atherectomy + SALLIE to D1(4/11/2024) presents to [facility] transferred from [facility]. Patient initially presented to [facility] ED with shortness of breath. Of note he was recently admitted from 09/27-12/02 for acute cholecystitis s/p cholecystectomy. In [facility] ED, pt was hypoxic to 86% on RA, trop 1.7K, EKG no new changes, CXR fluid overload. Admitted for AHRF 2/2 fluid overload. Pt received HD on 12/18, 12/19, 12/20 and 12/22. DAPT was resumed, TTE showed improvement in LVEF to 40-45%. Stress test was abnormal with 1mm inferior STD, reversible ischemia in the inferior wall and fixed defects in the lateral, anterior and apical walls with post stress EF of 24%. Pt was then transferred to [facility] for cardiac cath which showed pLAD 70% ISR, mLCx 70%, D1 severe dz. Patient now transferred to [facility] CTS Dr. Rivers for CABG eval. Patient denies any current SOB or chest pain on admission. Otherwise denies headaches, abdominal pain, urinary or bowel changes, fevers, chills, N/V/D, sick contacts.  (24 Dec 2024 05:07)    INTERIM EVENTS:Patient seen at bedside ,interim events noted.  12/23 Cardiac cath  pLAD 70% ISR, mLCx 70%, D1 severe dz.   12/ 25 VSS  CP free   HD via avf  for daily HD pre op- on lovenox 30 qd   12/26 HD today continue with present meds. Plan for CABG possibleTVR next week  12/27- VSS Sinus rhythm   12/28-Sinus rhythm plan for OR on Monday HD tomorrow  12/29-OOB Sinus rhythm no chest pain or dyspnea for HD with PRBC Tx today-CABG/TVR in AM -Dr Karan Rivers  12/30-Awake had episode bilious vomit after bedside PFT testing no abdominal pain For OR today Had HD with PRBC Tx      PMH -reviewed admission note, no change since admission  HEART FAILURE: Acute[x ]Chronic[ ] Systolic[x ] Diastolic[ ] Combined Systolic and Diastolic[ ]  CAD[ x] CABG[ ] PCI[x ]  DEVICES[ ] PPM[ ] ICD[ ] ILR[ ]  ATRIAL FIBRILLATION[ ] Paroxysmal[ ] Permanent[ ] CHADS2-[  ]  GHASSAN[ ] CKD1[ ] CKD2[ ] CKD3[ ] CKD4[ ] ESRD[x ]  COPD[ ] HTN[ ]   DM[ ] Type1[ ] Type 2[ ]   CVA[ ] Paresis[ ]    AMBULATION: Assisted[ ] Cane/walker[ ] Independent[x ]    MEDICATIONS  (STANDING):  acetaminophen     Tablet .. 1000 milliGRAM(s) Oral once  amLODIPine   Tablet 10 milliGRAM(s) Oral at bedtime  ascorbic acid 500 milliGRAM(s) Oral daily  aspirin enteric coated 81 milliGRAM(s) Oral daily  atorvastatin 80 milliGRAM(s) Oral at bedtime  calcium acetate 667 milliGRAM(s) Oral three times a day with meals  carvedilol 12.5 milliGRAM(s) Oral every 12 hours  cefuroxime  IVPB 1500 milliGRAM(s) IV Intermittent once  chlorhexidine 0.12% Liquid 30 milliLiter(s) Swish and Spit once  epoetin ignacia (EPOGEN) Injectable 86944 Unit(s) IV Push <User Schedule>  ferrous    sulfate 325 milliGRAM(s) Oral every 8 hours  folic acid 1 milliGRAM(s) Oral daily  gabapentin 300 milliGRAM(s) Oral once  insulin lispro (ADMELOG) corrective regimen sliding scale   SubCutaneous three times a day before meals  insulin lispro (ADMELOG) corrective regimen sliding scale   SubCutaneous at bedtime  mupirocin 2% Ointment 1 Application(s) Both Nostrils two times a day  pantoprazole    Tablet 40 milliGRAM(s) Oral before breakfast  sodium chloride 0.9% lock flush 3 milliLiter(s) IV Push every 8 hours  traZODone 100 milliGRAM(s) Oral at bedtime    MEDICATIONS  (PRN):            REVIEW OF SYSTEMS:  Constitutional: [ ] fever, [ ]weight loss,  [ ]fatigue [ ]weight gain  Eyes: [ ] visual changes  Respiratory: [ ]shortness of breath;  [ ] cough, [ ]wheezing, [ ]chills, [ ]hemoptysis  Cardiovascular: [ ] chest pain, [ ]palpitations, [ ]dizziness,  [ ]leg swelling[ ]orthopnea[ ]PND  Gastrointestinal: [ ] abdominal pain, [ ]nausea, [ ]vomiting,  [ ]diarrhea [ ]Constipation [ ]Melena  Genitourinary: [ ] dysuria, [ ] hematuria [ ]Vigil  Neurologic: [ ] headaches [ ] tremors[ ]weakness [ ]Paralysis Right[ ] Left[ ]  Skin: [ ] itching, [ ]burning, [ ] rashes  Endocrine: [ ] heat or cold intolerance  Musculoskeletal: [ ] joint pain or swelling; [ ] muscle, back, or extremity pain  Psychiatric: [ ] depression, [ ]anxiety, [ ]mood swings, or [ ]difficulty sleeping  Hematologic: [ ] easy bruising, [ ] bleeding gums    [ ] All remaining systems negative except as per above.   [ ]Unable to obtain.  [x] No change in ROS since admission      Vital Signs Last 24 Hrs  T(C): 37 (30 Dec 2024 05:38), Max: 37 (30 Dec 2024 05:38)  T(F): 98.6 (30 Dec 2024 05:38), Max: 98.6 (30 Dec 2024 05:38)  HR: 70 (30 Dec 2024 05:38) (67 - 77)  BP: 166/76 (30 Dec 2024 05:38) (143/73 - 171/83)  BP(mean): 113 (29 Dec 2024 19:55) (113 - 113)  RR: 18 (30 Dec 2024 05:38) (18 - 18)  SpO2: 94% (30 Dec 2024 05:38) (91% - 96%)    Parameters below as of 30 Dec 2024 05:38  Patient On (Oxygen Delivery Method): room air      I&O's Summary    28 Dec 2024 07:01  -  29 Dec 2024 07:00  --------------------------------------------------------  IN: 760 mL / OUT: 0 mL / NET: 760 mL    29 Dec 2024 07:01  -  30 Dec 2024 06:55  --------------------------------------------------------  IN: 50 mL / OUT: 1500 mL / NET: -1450 mL        PHYSICAL EXAM:  General: No acute distress BMI-31  HEENT: EOMI, PERRL  Neck: Supple, [ ] JVD  Lungs: Equal air entry bilaterally; [ ] rales [ ] wheezing [ ] rhonchi  Heart: Regular rate and rhythm; [x ] murmur   2/6 [ x] systolic [ ] diastolic [ ] radiation[ ] rubs [ ]  gallops  Abdomen: Nontender, bowel sounds present  Extremities: No clubbing, cyanosis, [ ] edema [ ]Pulses  equal and intact  Nervous system:  Alert & Oriented X3, no focal deficits  Psychiatric: Normal affect  Skin: No rashes or lesions    LABS:  12-29    139  |  100  |  83[H]  ----------------------------<  117[H]  5.7[H]   |  22  |  8.86[H]    Ca    8.7      29 Dec 2024 11:45      Creatinine Trend: 8.86<--, 7.20<--, 9.21<--, 7.88<--, 6.47<--, 7.65<--                        10.7   6.93  )-----------( 223      ( 30 Dec 2024 06:40 )             34.3       TTE W or WO Ultrasound Enhancing Agent (12.24.24 @ 14:25) >  CONCLUSIONS:      1. Left ventricular cavity is severely dilated. Left ventricular wall thickness is normal. Left ventricular systolic function is moderately to severely decreased with an ejection fraction of 35 % by Michaels's method of disks. Global left ventricular hypokinesis. EF 35%   2. Elevated left ventricular filling pressure.   3. Severe tricuspid regurgitation.   4. Moderate-to-severe pulmonary hypertension.       Lead ECG (12.25.24 @ 08:39) >   NORMAL SINUS RHYTHM  POSSIBLE LEFT ATRIAL ENLARGEMENT  PULMONARY DISEASE PATTERN  INCOMPLETE RIGHT BUNDLE BRANCH BLOCK  LEFT ANTERIOR FASCICULAR BLOCK  T WAVE ABNORMALITY, CONSIDER LATERAL ISCHEMIA  ABNORMAL ECG        Xray Chest 1 View- PORTABLE-Urgent (Xray Chest 1 View- PORTABLE-Urgent .) (12.24.24 @ 10:20) >  IMPRESSION:  Mild perihilar fullness. Nofocal consolidations.  Interval resolution of bilateral pleural effusions.           Cardiac Catheterization (12.23.24 @ 17:55) >  Diagnostic Conclusions:   Severe in-stent restenosis in LAD/D1 bifurcation. Severe in-stent stenosis in mid Cx. Elevated LVEDP 25 mmHg.

## 2024-12-31 LAB
ALBUMIN SERPL ELPH-MCNC: 3.2 G/DL — LOW (ref 3.3–5)
ALP SERPL-CCNC: 107 U/L — SIGNIFICANT CHANGE UP (ref 40–120)
ALT FLD-CCNC: 26 U/L — SIGNIFICANT CHANGE UP (ref 10–45)
ANION GAP SERPL CALC-SCNC: 14 MMOL/L — SIGNIFICANT CHANGE UP (ref 5–17)
APTT BLD: 30 SEC — SIGNIFICANT CHANGE UP (ref 24.5–35.6)
AST SERPL-CCNC: 55 U/L — HIGH (ref 10–40)
BASOPHILS # BLD AUTO: 0.03 K/UL — SIGNIFICANT CHANGE UP (ref 0–0.2)
BASOPHILS NFR BLD AUTO: 0.2 % — SIGNIFICANT CHANGE UP (ref 0–2)
BILIRUB SERPL-MCNC: 1.1 MG/DL — SIGNIFICANT CHANGE UP (ref 0.2–1.2)
BUN SERPL-MCNC: 31 MG/DL — HIGH (ref 7–23)
CALCIUM SERPL-MCNC: 8.2 MG/DL — LOW (ref 8.4–10.5)
CHLORIDE SERPL-SCNC: 102 MMOL/L — SIGNIFICANT CHANGE UP (ref 96–108)
CO2 BLDMV-SCNC: 28 MMOL/L — SIGNIFICANT CHANGE UP (ref 21–29)
CO2 SERPL-SCNC: 24 MMOL/L — SIGNIFICANT CHANGE UP (ref 22–31)
CREAT SERPL-MCNC: 3.36 MG/DL — HIGH (ref 0.5–1.3)
EGFR: 21 ML/MIN/1.73M2 — LOW
EGFR: 21 ML/MIN/1.73M2 — LOW
EOSINOPHIL # BLD AUTO: 0.02 K/UL — SIGNIFICANT CHANGE UP (ref 0–0.5)
EOSINOPHIL NFR BLD AUTO: 0.2 % — SIGNIFICANT CHANGE UP (ref 0–6)
FIBRINOGEN PPP-MCNC: 271 MG/DL — SIGNIFICANT CHANGE UP (ref 200–445)
GAS PNL BLDA: SIGNIFICANT CHANGE UP
GAS PNL BLDMV: SIGNIFICANT CHANGE UP
GAS PNL BLDV: SIGNIFICANT CHANGE UP
GLUCOSE SERPL-MCNC: 128 MG/DL — HIGH (ref 70–99)
HCO3 BLDMV-SCNC: 27 MMOL/L — SIGNIFICANT CHANGE UP (ref 20–28)
HCT VFR BLD CALC: 27.9 % — LOW (ref 39–50)
HGB BLD-MCNC: 9.1 G/DL — LOW (ref 13–17)
HOROWITZ INDEX BLDMV+IHG-RTO: 40 — SIGNIFICANT CHANGE UP
INR BLD: 1.42 RATIO — HIGH (ref 0.85–1.16)
LYMPHOCYTES # BLD AUTO: 0.31 K/UL — LOW (ref 1–3.3)
LYMPHOCYTES # BLD AUTO: 2.5 % — LOW (ref 13–44)
MCHC RBC-ENTMCNC: 30.6 PG — SIGNIFICANT CHANGE UP (ref 27–34)
MCHC RBC-ENTMCNC: 32.6 G/DL — SIGNIFICANT CHANGE UP (ref 32–36)
MCV RBC AUTO: 93.9 FL — SIGNIFICANT CHANGE UP (ref 80–100)
MONOCYTES NFR BLD AUTO: 6.2 % — SIGNIFICANT CHANGE UP (ref 2–14)
NEUTROPHILS # BLD AUTO: 11.29 K/UL — HIGH (ref 1.8–7.4)
NEUTROPHILS NFR BLD AUTO: 90.2 % — HIGH (ref 43–77)
NRBC # BLD: 0 /100 WBCS — SIGNIFICANT CHANGE UP (ref 0–0)
NRBC BLD-RTO: 0 /100 WBCS — SIGNIFICANT CHANGE UP (ref 0–0)
O2 CT VFR BLD CALC: 47 MMHG — SIGNIFICANT CHANGE UP (ref 30–65)
PCO2 BLDMV: 44 MMHG — SIGNIFICANT CHANGE UP (ref 30–65)
PH BLDMV: 7.39 — SIGNIFICANT CHANGE UP (ref 7.32–7.45)
PHOSPHATE SERPL-MCNC: 2.8 MG/DL — SIGNIFICANT CHANGE UP (ref 2.5–4.5)
PLATELET # BLD AUTO: 208 K/UL — SIGNIFICANT CHANGE UP (ref 150–400)
POTASSIUM SERPL-MCNC: 4.1 MMOL/L — SIGNIFICANT CHANGE UP (ref 3.5–5.3)
POTASSIUM SERPL-SCNC: 4.1 MMOL/L — SIGNIFICANT CHANGE UP (ref 3.5–5.3)
PROT SERPL-MCNC: 6 G/DL — SIGNIFICANT CHANGE UP (ref 6–8.3)
PROTHROM AB SERPL-ACNC: 16.1 SEC — HIGH (ref 9.9–13.4)
RBC # BLD: 2.97 M/UL — LOW (ref 4.2–5.8)
RBC # FLD: 19.3 % — HIGH (ref 10.3–14.5)
SAO2 % BLDMV: 76.8 — SIGNIFICANT CHANGE UP (ref 60–90)
SODIUM SERPL-SCNC: 140 MMOL/L — SIGNIFICANT CHANGE UP (ref 135–145)
WBC # BLD: 12.51 K/UL — HIGH (ref 3.8–10.5)
WBC # FLD AUTO: 12.51 K/UL — HIGH (ref 3.8–10.5)

## 2024-12-31 PROCEDURE — 99291 CRITICAL CARE FIRST HOUR: CPT

## 2024-12-31 PROCEDURE — 71045 X-RAY EXAM CHEST 1 VIEW: CPT | Mod: 26

## 2024-12-31 PROCEDURE — 93010 ELECTROCARDIOGRAM REPORT: CPT

## 2024-12-31 RX ORDER — HEPARIN SODIUM 1000 [USP'U]/ML
5000 INJECTION INTRAVENOUS; SUBCUTANEOUS EVERY 8 HOURS
Refills: 0 | Status: DISCONTINUED | OUTPATIENT
Start: 2024-12-31 | End: 2024-12-31

## 2024-12-31 RX ORDER — NICARDIPINE HCL 30 MG
5 CAPSULE ORAL
Qty: 40 | Refills: 0 | Status: DISCONTINUED | OUTPATIENT
Start: 2024-12-31 | End: 2025-01-01

## 2024-12-31 RX ORDER — LISINOPRIL 5 MG/1
20 TABLET ORAL DAILY
Refills: 0 | Status: DISCONTINUED | OUTPATIENT
Start: 2024-12-31 | End: 2025-01-01

## 2024-12-31 RX ORDER — B1/B2/B3/B5/B6/B12/VIT C/FOLIC 500-0.5 MG
1 TABLET ORAL DAILY
Refills: 0 | Status: DISCONTINUED | OUTPATIENT
Start: 2024-12-31 | End: 2025-03-14

## 2024-12-31 RX ORDER — ATORVASTATIN CALCIUM 80 MG/1
80 TABLET, FILM COATED ORAL DAILY
Refills: 0 | Status: DISCONTINUED | OUTPATIENT
Start: 2024-12-31 | End: 2025-01-03

## 2024-12-31 RX ORDER — ACETAMINOPHEN 500 MG/5ML
1000 LIQUID (ML) ORAL ONCE
Refills: 0 | Status: COMPLETED | OUTPATIENT
Start: 2024-12-31 | End: 2024-12-31

## 2024-12-31 RX ORDER — HEPARIN SODIUM 1000 [USP'U]/ML
5000 INJECTION INTRAVENOUS; SUBCUTANEOUS EVERY 12 HOURS
Refills: 0 | Status: DISCONTINUED | OUTPATIENT
Start: 2024-12-31 | End: 2025-01-03

## 2024-12-31 RX ORDER — TRAZODONE HCL 100 MG
100 TABLET ORAL AT BEDTIME
Refills: 0 | Status: DISCONTINUED | OUTPATIENT
Start: 2024-12-31 | End: 2025-01-03

## 2024-12-31 RX ORDER — HYDROMORPHONE/SOD CHLOR,ISO/PF 2 MG/10 ML
0.25 SYRINGE (ML) INJECTION ONCE
Refills: 0 | Status: DISCONTINUED | OUTPATIENT
Start: 2024-12-31 | End: 2024-12-31

## 2024-12-31 RX ORDER — ASPIRIN 325 MG
81 TABLET ORAL DAILY
Refills: 0 | Status: DISCONTINUED | OUTPATIENT
Start: 2024-12-31 | End: 2025-01-03

## 2024-12-31 RX ADMIN — Medication 650 MILLIGRAM(S): at 12:45

## 2024-12-31 RX ADMIN — Medication 3 UNIT(S)/HR: at 07:35

## 2024-12-31 RX ADMIN — Medication 0.5 MILLIGRAM(S): at 13:00

## 2024-12-31 RX ADMIN — OXYCODONE HYDROCHLORIDE 10 MILLIGRAM(S): 30 TABLET ORAL at 08:00

## 2024-12-31 RX ADMIN — POLYETHYLENE GLYCOL 3350 17 GRAM(S): 17 POWDER, FOR SOLUTION ORAL at 11:56

## 2024-12-31 RX ADMIN — ATORVASTATIN CALCIUM 80 MILLIGRAM(S): 80 TABLET, FILM COATED ORAL at 06:02

## 2024-12-31 RX ADMIN — Medication 2 TABLET(S): at 21:01

## 2024-12-31 RX ADMIN — OXYCODONE HYDROCHLORIDE 10 MILLIGRAM(S): 30 TABLET ORAL at 07:36

## 2024-12-31 RX ADMIN — DOBUTAMINE 2.55 MICROGRAM(S)/KG/MIN: 250 INJECTION INTRAVENOUS at 07:35

## 2024-12-31 RX ADMIN — Medication 5 MILLIGRAM(S): at 06:27

## 2024-12-31 RX ADMIN — Medication 25 MG/HR: at 07:35

## 2024-12-31 RX ADMIN — LISINOPRIL 20 MILLIGRAM(S): 5 TABLET ORAL at 10:30

## 2024-12-31 RX ADMIN — Medication 100 MILLIGRAM(S): at 21:01

## 2024-12-31 RX ADMIN — Medication 5 MILLIGRAM(S): at 21:01

## 2024-12-31 RX ADMIN — Medication 40 MILLIGRAM(S): at 11:56

## 2024-12-31 RX ADMIN — Medication 0.5 MILLIGRAM(S): at 17:35

## 2024-12-31 RX ADMIN — Medication 667 MILLIGRAM(S): at 16:54

## 2024-12-31 RX ADMIN — Medication 0.5 MILLIGRAM(S): at 03:10

## 2024-12-31 RX ADMIN — Medication 650 MILLIGRAM(S): at 16:55

## 2024-12-31 RX ADMIN — Medication 1 APPLICATION(S): at 12:52

## 2024-12-31 RX ADMIN — Medication 0.5 MILLIGRAM(S): at 23:45

## 2024-12-31 RX ADMIN — Medication 500 MILLIGRAM(S): at 06:04

## 2024-12-31 RX ADMIN — Medication 650 MILLIGRAM(S): at 06:04

## 2024-12-31 RX ADMIN — Medication 1 TABLET(S): at 11:56

## 2024-12-31 RX ADMIN — Medication 15 MILLILITER(S): at 16:55

## 2024-12-31 RX ADMIN — GABAPENTIN 100 MILLIGRAM(S): 400 CAPSULE ORAL at 06:04

## 2024-12-31 RX ADMIN — Medication 0.25 MILLIGRAM(S): at 01:15

## 2024-12-31 RX ADMIN — Medication 0.5 MILLIGRAM(S): at 03:25

## 2024-12-31 RX ADMIN — OXYCODONE HYDROCHLORIDE 10 MILLIGRAM(S): 30 TABLET ORAL at 23:37

## 2024-12-31 RX ADMIN — Medication 0.5 MILLIGRAM(S): at 12:45

## 2024-12-31 RX ADMIN — Medication 667 MILLIGRAM(S): at 11:56

## 2024-12-31 RX ADMIN — Medication 5 MILLIGRAM(S): at 12:48

## 2024-12-31 RX ADMIN — Medication 0.25 MILLIGRAM(S): at 01:00

## 2024-12-31 RX ADMIN — Medication 650 MILLIGRAM(S): at 11:56

## 2024-12-31 RX ADMIN — Medication 400 MILLIGRAM(S): at 00:00

## 2024-12-31 RX ADMIN — HEPARIN SODIUM 5000 UNIT(S): 1000 INJECTION INTRAVENOUS; SUBCUTANEOUS at 16:55

## 2024-12-31 RX ADMIN — Medication 25 MG/HR: at 06:46

## 2024-12-31 RX ADMIN — AMIODARONE HYDROCHLORIDE 400 MILLIGRAM(S): 50 INJECTION, SOLUTION INTRAVENOUS at 16:55

## 2024-12-31 RX ADMIN — OXYCODONE HYDROCHLORIDE 10 MILLIGRAM(S): 30 TABLET ORAL at 22:37

## 2024-12-31 RX ADMIN — GABAPENTIN 100 MILLIGRAM(S): 400 CAPSULE ORAL at 21:01

## 2024-12-31 RX ADMIN — Medication 0.5 MILLIGRAM(S): at 18:00

## 2024-12-31 RX ADMIN — Medication 500 MILLIGRAM(S): at 16:54

## 2024-12-31 RX ADMIN — GABAPENTIN 100 MILLIGRAM(S): 400 CAPSULE ORAL at 12:49

## 2024-12-31 RX ADMIN — Medication 1000 MILLIGRAM(S): at 00:15

## 2024-12-31 RX ADMIN — AMIODARONE HYDROCHLORIDE 400 MILLIGRAM(S): 50 INJECTION, SOLUTION INTRAVENOUS at 06:03

## 2024-12-31 RX ADMIN — Medication 650 MILLIGRAM(S): at 17:45

## 2024-12-31 RX ADMIN — Medication 650 MILLIGRAM(S): at 06:30

## 2024-12-31 RX ADMIN — Medication 100 MILLIGRAM(S): at 01:00

## 2024-12-31 RX ADMIN — Medication 650 MILLIGRAM(S): at 23:46

## 2024-12-31 RX ADMIN — Medication 81 MILLIGRAM(S): at 06:03

## 2024-12-31 NOTE — PHYSICAL THERAPY INITIAL EVALUATION ADULT - PERTINENT HX OF CURRENT PROBLEM, REHAB EVAL
Pt is a 54 yr old male HTN, HLD, ESRD on HD MWF via LUE AVF / ascites (requiring repeat paracenteses q4-6wks) / NICM with moderate LV dysfunction w/ EF 35-40%(2023) and CAD s/p atherectomy + SALLIE to D1(4/11/2024).  CAD with instent restenosis of LAD with D1 and LCx disease s/p CABG x 3 with free LIMA-LAD, vein to diag and Left PL POD 1 Pt is a 54 yr old male HTN, HLD, ESRD on HD MWF via LUE AVF / ascites (requiring repeat paracenteses q4-6wks) / NICM with moderate LV dysfunction w/ EF 35-40%(2023) and CAD s/p atherectomy + SALLIE to D1(4/11/2024). CAD with instent restenosis of LAD with D1 and LCx disease s/p CABG x 3 with free LIMA-LAD, vein to diag and Left PL. Pt is a 54 yr old male HTN, HLD, ESRD on HD MWF via LUE AVF / ascites (requiring repeat paracenteses q4-6wks) / NICM with moderate LV dysfunction w/ EF 35-40%(2023) and CAD s/p atherectomy + SALLIE to D1(4/11/2024). CAD with instent restenosis of LAD with D1 and LCx disease s/p CABG x 3 with free LIMA-LAD, vein to diag and Left PL.    2/4/25- PT wound care consulted for Negative pressure therapy evaluation to now stage III sacral pressure injury s/p debridement 1/31/25

## 2024-12-31 NOTE — PROGRESS NOTE ADULT - ASSESSMENT
54M with PMH of HTN, HLD, ESRD on HD MWF via LUE AVF, ascites (requiring repeat paracenteses q4-6wks), NICM with moderate LV dysfunction w/ EF 35-40%(2023) and CAD s/p atherectomy + SALLIE to D1(4/11/2024) presents to [facility] transferred from [facility] for CABG eval  Nephro on [facility] for HD    ESRD on HD   Regular schedule MWF   Access LUE AVF  [facility]  Nephrologist Dr. Bailey  Last HD on 12/24  S/p HD on 12/27 12/29, 12/30 for hyperkalemia  HD today as per holiday schedule   Keep MAP>65  Renal Diet  Consent in physical chart    Hyperkalemia  Lokelma on non dialysis days  Monitor K     HTN  BP controlled   cw home meds  monitor bp     CKD MBD  Can send a PTH  Ca and Phos daily    Anemia  CRISTIANE with HD  Transfuse if hgb <7    CAD with multivessel disease  s/p CABG 12/30  ICU following

## 2024-12-31 NOTE — PROGRESS NOTE ADULT - SUBJECTIVE AND OBJECTIVE BOX
MR#[id number]  PATIENT NAME:RAAD CANO    DATE OF SERVICE: 12-31-24 @ 07:16  Patient was seen and examined by Solo Quick MD on    12-31-24 @ 07:16 .  Interim events noted.Consultant notes ,Labs,Telemetry reviewed by me       HOSPITAL COURSE: HPI:  54M with PMH of HTN, HLD, ESRD on HD MWF via LUE AVF, ascites (requiring repeat paracenteses q4-6wks), NICM with moderate LV dysfunction w/ EF 35-40%(2023) and CAD s/p atherectomy + SALLIE to D1(4/11/2024) presents to [facility] transferred from [facility]. Patient initially presented to [facility] ED with shortness of breath. Of note he was recently admitted from 09/27-12/02 for acute cholecystitis s/p cholecystectomy. In [facility] ED, pt was hypoxic to 86% on RA, trop 1.7K, EKG no new changes, CXR fluid overload. Admitted for AHRF 2/2 fluid overload. Pt received HD on 12/18, 12/19, 12/20 and 12/22. DAPT was resumed, TTE showed improvement in LVEF to 40-45%. Stress test was abnormal with 1mm inferior STD, reversible ischemia in the inferior wall and fixed defects in the lateral, anterior and apical walls with post stress EF of 24%. Pt was then transferred to [facility] for cardiac cath which showed pLAD 70% ISR, mLCx 70%, D1 severe dz. Patient now transferred to [facility] CTS Dr. Rivers for CABG eval. Patient denies any current SOB or chest pain on admission. Otherwise denies headaches, abdominal pain, urinary or bowel changes, fevers, chills, N/V/D, sick contacts.  (24 Dec 2024 05:07)      INTERIM EVENTS:Patient seen at bedside ,interim events noted.  12/23 Cardiac cath  pLAD 70% ISR, mLCx 70%, D1 severe dz.   12/ 25 VSS  CP free   HD via avf  for daily HD pre op- on lovenox 30 qd   12/26 HD today continue with present meds. Plan for CABG possibleTVR next week  12/27- VSS Sinus rhythm   12/28-Sinus rhythm plan for OR on Monday HD tomorrow  12/29-OOB Sinus rhythm no chest pain or dyspnea for HD with PRBC Tx today-CABG/TVR in AM -Dr Karan Rivers  12/30-Awake had episode bilious vomit after bedside PFT testing no abdominal pain For OR today Had HD with PRBC Tx  12/31-POD#1-C3L free LIMA-LAD; SVG-Diag; SVG-leftPL Awake OOB extubated Sinus rhythm on Dobutamine gtt      PMH -reviewed admission note, no change since admission  HEART FAILURE: Acute[x ]Chronic[ ] Systolic[x ] Diastolic[ ] Combined Systolic and Diastolic[ ]  CAD[ x] CABG[ ] PCI[x ]  DEVICES[ ] PPM[ ] ICD[ ] ILR[ ]  ATRIAL FIBRILLATION[ ] Paroxysmal[ ] Permanent[ ] CHADS2-[  ]  GHASSAN[ ] CKD1[ ] CKD2[ ] CKD3[ ] CKD4[ ] ESRD[x ]  COPD[ ] HTN[ ]   DM[ ] Type1[ ] Type 2[ ]   CVA[ ] Paresis[ ]    AMBULATION: Assisted[ ] Cane/walker[ ] Independent[x ]          MEDICATIONS  (STANDING):  acetaminophen     Tablet .. 650 milliGRAM(s) Oral every 6 hours  aMIOdarone    Tablet 400 milliGRAM(s) Oral two times a day  ascorbic acid 500 milliGRAM(s) Oral two times a day  aspirin enteric coated 81 milliGRAM(s) Oral daily  atorvastatin 80 milliGRAM(s) Oral daily  cefuroxime  IVPB 1500 milliGRAM(s) IV Intermittent every 24 hours  chlorhexidine 0.12% Liquid 15 milliLiter(s) Oral Mucosa every 12 hours  chlorhexidine 2% Cloths 1 Application(s) Topical daily  dexMEDEtomidine Infusion 0.5 MICROgram(s)/kG/Hr (10.6 mL/Hr) IV Continuous <Continuous>  dextrose 50% Injectable 50 milliLiter(s) IV Push every 15 minutes  dextrose 50% Injectable 25 milliLiter(s) IV Push every 15 minutes  DOBUTamine Infusion 3 MICROgram(s)/kG/Min (7.66 mL/Hr) IV Continuous <Continuous>  gabapentin 100 milliGRAM(s) Oral every 8 hours  insulin regular Infusion 3 Unit(s)/Hr (3 mL/Hr) IV Continuous <Continuous>  meperidine     Injectable 25 milliGRAM(s) IV Push once  metoclopramide Injectable 5 milliGRAM(s) IV Push every 8 hours  niCARdipine Infusion 5 mG/Hr (25 mL/Hr) IV Continuous <Continuous>  pantoprazole  Injectable 40 milliGRAM(s) IV Push daily  polyethylene glycol 3350 17 Gram(s) Oral daily  senna 2 Tablet(s) Oral at bedtime  sodium chloride 0.9%. 1000 milliLiter(s) (10 mL/Hr) IV Continuous <Continuous>    MEDICATIONS  (PRN):  HYDROmorphone  Injectable 0.5 milliGRAM(s) IV Push every 6 hours PRN Breakthrough Pain  oxyCODONE    IR 5 milliGRAM(s) Oral every 4 hours PRN Moderate Pain (4 - 6)  oxyCODONE    IR 10 milliGRAM(s) Oral every 4 hours PRN Severe Pain (7 - 10)            REVIEW OF SYSTEMS:  Constitutional: [ ] fever, [ ]weight loss,  [x ]fatigue [ ]weight gain  Eyes: [ ] visual changes  Respiratory: [ ]shortness of breath;  [ ] cough, [ ]wheezing, [ ]chills, [ ]hemoptysis  Cardiovascular: [ ] chest pain, [ ]palpitations, [ ]dizziness,  [ ]leg swelling[ ]orthopnea[ ]PND  Gastrointestinal: [ ] abdominal pain, [ ]nausea, [ ]vomiting,  [ ]diarrhea [ ]Constipation [ ]Melena  Genitourinary: [ ] dysuria, [ ] hematuria [ ]Vigil  Neurologic: [ ] headaches [ ] tremors[ ]weakness [ ]Paralysis Right[ ] Left[ ]  Skin: [ ] itching, [ ]burning, [ ] rashes  Endocrine: [ ] heat or cold intolerance  Musculoskeletal: [ ] joint pain or swelling; [ ] muscle, back, or extremity pain  Psychiatric: [ ] depression, [ ]anxiety, [ ]mood swings, or [ ]difficulty sleeping  Hematologic: [ ] easy bruising, [ ] bleeding gums    [ ] All remaining systems negative except as per above.   [ ]Unable to obtain.  [x] No change in ROS since admission      Vital Signs Last 24 Hrs  T(C): 36.8 (31 Dec 2024 04:00), Max: 36.8 (31 Dec 2024 04:00)  T(F): 98.2 (31 Dec 2024 04:00), Max: 98.2 (31 Dec 2024 04:00)  HR: 76 (31 Dec 2024 07:00) (60 - 85)  BP: 175/87 (30 Dec 2024 12:01) (175/87 - 175/87)  BP(mean): 113 (30 Dec 2024 12:01) (113 - 113)  RR: 9 (31 Dec 2024 07:00) (7 - 22)  SpO2: 99% (31 Dec 2024 07:00) (94% - 100%)    Parameters below as of 31 Dec 2024 04:00  Patient On (Oxygen Delivery Method): aerosol face mask     O2 Concentration (%): 40  I&O's Summary    30 Dec 2024 07:01  -  31 Dec 2024 07:00  --------------------------------------------------------  IN: 614.9 mL / OUT: 880 mL / NET: -265.1 mL        PHYSICAL EXAM:  General: No acute distress BMI-24  HEENT: EOMI, PERRL  Neck: Supple, [ ] JVD  Lungs: Equal air entry bilaterally; [ ] rales [ ] wheezing [ ] rhonchi  Heart: Regular rate and rhythm; [x ] murmur   2/6 [ x] systolic [ ] diastolic [ ] radiation[ ] rubs [ ]  gallops  Abdomen: Nontender, bowel sounds present  Extremities: No clubbing, cyanosis, [ ] edema [ ]Pulses  equal and intact  Nervous system:  Alert & Oriented X3, no focal deficits  Psychiatric: Normal affect  Skin: No rashes or lesions    LABS:  12-31    140  |  102  |  31[H]  ----------------------------<  128[H]  4.1   |  24  |  3.36[H]    Ca    8.2[L]      31 Dec 2024 00:30  Phos  2.8     12-31  Mg     2.1     12-31    TPro  6.0  /  Alb  3.2[L]  /  TBili  1.1  /  DBili  x   /  AST  55[H]  /  ALT  26  /  AlkPhos  107  12-31    Creatinine Trend: 3.36<--, 5.24<--, 6.71<--, 8.86<--, 7.20<--, 9.21<--                        9.1    12.51 )-----------( 208      ( 31 Dec 2024 00:30 )             27.9     PT/INR - ( 31 Dec 2024 00:30 )   PT: 16.1 sec;   INR: 1.42 ratio         PTT - ( 31 Dec 2024 00:30 )  PTT:30.0 sec       Intra-Operative Transesophageal Echo W or WO Ultrasound Enhancing Agent (12.30.24 @ 11:31) >  Post-Bypass:  S/p CABG x3  LV systolic function improved on Dobutmine 5 mcg/kg/min. No new wall motion abnormalities. EF ~45%. Norml RV function.  Mild TR.  mild PI.  Trace MR.  Trace AI.  No aortic dissection visualized.      PREOP-TTE W or WO Ultrasound Enhancing Agent (12.24.24 @ 14:25) >  CONCLUSIONS:      1. Left ventricular cavity is severely dilated. Left ventricular wall thickness is normal. Left ventricular systolic function is moderately to severely decreased with an ejection fraction of 35 % by Michaels's method of disks. Global left ventricular hypokinesis. EF 35%   2. Elevated left ventricular filling pressure.   3. Severe tricuspid regurgitation.   4. Moderate-to-severe pulmonary hypertension.       Cardiac Catheterization (12.23.24 @ 17:55) >  Diagnostic Conclusions:   Severe in-stent restenosis in LAD/D1 bifurcation. Severe in-stent stenosis in mid Cx. Elevated LVEDP 25 mmHg.

## 2024-12-31 NOTE — AIRWAY REMOVAL NOTE  ADULT & PEDS - ARTIFICAL AIRWAY REMOVAL COMMENTS
Written order for extubation verified. The patient was identified by full name and birth date compared to the identification band. Present during the procedure was Walker Hunt.

## 2024-12-31 NOTE — PHYSICAL THERAPY INITIAL EVALUATION ADULT - MODALITIES TREATMENT COMMENTS
2/4/25- Pt currently with sacral stage III pressure injury s/p debridement by plastics on 1/31/25. Wound measuring [id number] with no undermining/tunneling, scant serosanguinous drainage, wound bed with 80% slough and 20% pink granular tissue, periwound skin intact.

## 2024-12-31 NOTE — PROGRESS NOTE ADULT - SUBJECTIVE AND OBJECTIVE BOX
[facility]   MD DREW VIZCARRA MD SAKIL BHUIYAN, MD LEILA MENG, JAX    TEL:  OFFICE: [phone]  From 5pm-7am Answering Service [phone]    -- RENAL FOLLOW UP NOTE ---Date of Service 12-31-24 @ 12:45    Patient is a 54y old  Male who presents with a chief complaint of CAD    Patient seen and examined at bedside. No chest pain/sob    VITALS:  T(F): 98.4 (12-31-24 @ 12:00), Max: 98.4 (12-31-24 @ 12:00)  HR: 79 (12-31-24 @ 12:15)  BP: --  RR: 22 (12-31-24 @ 12:15)  SpO2: 99% (12-31-24 @ 12:15)  Wt(kg): --    12-30 @ 07:01  -  12-31 @ 07:00  --------------------------------------------------------  IN: 679.9 mL / OUT: 880 mL / NET: -200.1 mL    12-31 @ 07:01  -  12-31 @ 12:45  --------------------------------------------------------  IN: 69.5 mL / OUT: 160 mL / NET: -90.5 mL          PHYSICAL EXAM:  General: NAD  Neck: No JVD  Respiratory: CTAB, no wheezes, rales or rhonchi, +chest tube  Cardiovascular: S1, S2, RRR; +sternal dressing  Gastrointestinal: BS+, soft, NT/ND  Extremities: b/l +1 pedal edema    Hospital Medications:   MEDICATIONS  (STANDING):  acetaminophen     Tablet .. 650 milliGRAM(s) Oral every 6 hours  aMIOdarone    Tablet 400 milliGRAM(s) Oral two times a day  ascorbic acid 500 milliGRAM(s) Oral two times a day  aspirin enteric coated 81 milliGRAM(s) Oral daily  atorvastatin 80 milliGRAM(s) Oral daily  calcium acetate 667 milliGRAM(s) Oral three times a day with meals  cefuroxime  IVPB 1500 milliGRAM(s) IV Intermittent every 24 hours  chlorhexidine 0.12% Liquid 15 milliLiter(s) Oral Mucosa every 12 hours  chlorhexidine 2% Cloths 1 Application(s) Topical daily  dexMEDEtomidine Infusion 0.5 MICROgram(s)/kG/Hr (10.6 mL/Hr) IV Continuous <Continuous>  dextrose 50% Injectable 50 milliLiter(s) IV Push every 15 minutes  dextrose 50% Injectable 25 milliLiter(s) IV Push every 15 minutes  DOBUTamine Infusion 1 MICROgram(s)/kG/Min (2.55 mL/Hr) IV Continuous <Continuous>  gabapentin 100 milliGRAM(s) Oral every 8 hours  heparin   Injectable 5000 Unit(s) SubCutaneous every 12 hours  insulin regular Infusion 3 Unit(s)/Hr (3 mL/Hr) IV Continuous <Continuous>  lisinopril 20 milliGRAM(s) Oral daily  metoclopramide Injectable 5 milliGRAM(s) IV Push every 8 hours  Nephro-elicia 1 Tablet(s) Oral daily  niCARdipine Infusion 5 mG/Hr (25 mL/Hr) IV Continuous <Continuous>  pantoprazole    Tablet 40 milliGRAM(s) Oral before breakfast  polyethylene glycol 3350 17 Gram(s) Oral daily  senna 2 Tablet(s) Oral at bedtime  sodium chloride 0.9%. 1000 milliLiter(s) (10 mL/Hr) IV Continuous <Continuous>  traZODone 100 milliGRAM(s) Oral at bedtime      LABS:  12-31    140  |  102  |  31[H]  ----------------------------<  128[H]  4.1   |  24  |  3.36[H]    Ca    8.2[L]      31 Dec 2024 00:30  Phos  2.8     12-31  Mg     2.1     12-31    TPro  6.0  /  Alb  3.2[L]  /  TBili  1.1  /  DBili      /  AST  55[H]  /  ALT  26  /  AlkPhos  107  12-31    Creatinine Trend: 3.36 <--, 5.24 <--, 6.71 <--, 8.86 <--, 7.20 <--, 9.21 <--, 7.88 <--, 6.47 <--    Albumin: 3.2 g/dL (12-31 @ 00:30)  Phosphorus: 2.8 mg/dL (12-31 @ 00:30)  Albumin: 3.0 g/dL (12-30 @ 19:08)  Phosphorus: 3.7 mg/dL (12-30 @ 19:08)                              9.1    12.51 )-----------( 208      ( 31 Dec 2024 00:30 )             27.9     Urine Studies:  Urinalysis - [12-31-24 @ 00:30]      Color  / Appearance  / SG  / pH       Gluc 128 / Ketone   / Bili  / Urobili        Blood  / Protein  / Leuk Est  / Nitrite       RBC  / WBC  / Hyaline  / Gran  / Sq Epi  / Non Sq Epi  / Bacteria       Iron 29, TIBC 143, %sat 20      [12-19-24 @ 05:00]  Ferritin 904      [12-19-24 @ 05:00]  TSH 4.75      [12-24-24 @ 06:50]    HBsAg Nonreact      [12-19-24 @ 05:00]      RADIOLOGY & ADDITIONAL STUDIES:

## 2024-12-31 NOTE — PROGRESS NOTE ADULT - ASSESSMENT
54M with PMH of HTN, HLD, ESRD on HD MWF via LUE AVF, ascites (requiring repeat paracenteses q4-6wks), NICM with moderate LV dysfunction w/ EF 35-40%() and CAD s/p atherectomy + SALLIE to D1(2024) presents to [facility] transferred from [facility]. Patient initially presented to [facility] ED with shortness of breath. Of note he was recently admitted from - for acute cholecystitis s/p cholecystectomy. In [facility] ED, pt was hypoxic to 86% on RA, trop 1.7K, EKG no new changes, CXR fluid overload. Admitted for AHRF 2/2 fluid overload. Pt received HD on , ,  and . DAPT was resumed, TTE showed improvement in LVEF to 40-45%. Stress test was abnormal with 1mm inferior STD, reversible ischemia in the inferior wall and fixed defects in the lateral, anterior and apical walls with post stress EF of 24%.     Pt was then transferred to [facility] for cardiac cath which showed pLAD 70% ISR, mLCx 70%, D1 severe dz.     Patient now transferred to [facility] CTS Dr. Rivers for CABG eval.    # CAD s/p NSTEMI  Hx CAD s/p PCI LAD   Presented with ADHF elevated troponins  Positive NST  -Cath- pLAD 70% ISR, mLCx 70%, D1 severe dz.   TTE EF 35% Severe TR  Was on [id number] been off Plavix since -POD#1-C3L free LIMA-LAD; SVG-Diag; SVG-leftPL  Extubated on  Sinus rhythm,Amio for AF prophylaxis          # Acute on Chronic Systolic Heart Failure  EF-35% ,severe TR  Had HD post op  GDMT post op  LVEF improved post bypass      # Ascites  Hx chronic ascites  Has drainage q4-6 weeks  Last drained -4600mL  ? ascites related to severe TR  Monitor        # ESRD  On HD  HD post op      # T2DM  A1c-5.6  RISS

## 2024-12-31 NOTE — PROGRESS NOTE ADULT - SUBJECTIVE AND OBJECTIVE BOX
HPI:  54M with PMH of HTN, HLD, ESRD on HD MWF via LUE AVF, ascites (requiring repeat paracenteses q4-6wks), NICM with moderate LV dysfunction w/ EF 35-40%(2023) and CAD s/p atherectomy + SALLIE to D1(4/11/2024) presents to [facility] transferred from [facility]. Patient initially presented to [facility] with shortness of breath. Of note he was recently admitted from 09/27-12/02 for acute cholecystitis s/p cholecystectomy. In [facility] ED, pt was hypoxic to 86% on RA, trop 1.7K, EKG no new changes, CXR fluid overload. Admitted for AHRF 2/2 fluid overload. Pt received HD on 12/18, 12/19, 12/20 and 12/22. DAPT was resumed, TTE showed improvement in LVEF to 40-45%. Stress test was abnormal with 1mm inferior STD, reversible ischemia in the inferior wall and fixed defects in the lateral, anterior and apical walls with post stress EF of 24%. Pt was then transferred to [facility] for cardiac cath which showed pLAD 70% ISR, mLCx 70%, D1 severe dz. Patient now transferred to [facility] CTS Dr. Rivers for CABG eval. Patient denies any current SOB or chest pain on admission. Otherwise denies headaches, abdominal pain, urinary or bowel changes, fevers, chills, N/V/D, sick contacts.  (24 Dec 2024 05:07)    Patient underwent CABG x 3 12/30 with with free LIMA-LAD, vein to diag and Left PL.    Patient seen and examined at the bedside.    Remained critically ill on continuous ICU monitoring.    OBJECTIVE:  Vital Signs Last 24 Hrs  T(C): 36.6 (31 Dec 2024 08:00), Max: 36.8 (31 Dec 2024 04:00)  T(F): 97.9 (31 Dec 2024 08:00), Max: 98.2 (31 Dec 2024 04:00)  HR: 74 (31 Dec 2024 08:15) (60 - 90)  BP: 175/87 (30 Dec 2024 12:01) (175/87 - 175/87)  BP(mean): 113 (30 Dec 2024 12:01) (113 - 113)  RR: 10 (31 Dec 2024 08:15) (7 - 26)  SpO2: 97% (31 Dec 2024 08:15) (94% - 100%)    Parameters below as of 31 Dec 2024 08:00  Patient On (Oxygen Delivery Method): nasal cannula    O2 Concentration (%): 4    Physical Exam:   General: NAD   Neurology: nonfocal   Eyes: bilateral pupils equal and reactive   ENT/Neck: Neck supple, trachea midline, No JVD   Respiratory: Clear bilaterally   CV: S1S2, no murmurs        [x] Sternal dressing, [x] Mediastinal CT x2, [x] L Pleural CT        [x] Sinus rhythm  Abdominal: Soft, NT, ND +BS  Extremities: 1-2+ pedal edema noted, + peripheral pulses   Skin: No Rashes, Hematoma, Ecchymosis                         Assessment:   54 yr male HTN / HLD / ESRD on HD MWF via LUE AVF / ascites (requiring repeat paracenteses q4-6wks) / NICM with moderate LV dysfunction w/ EF 35-40%(2023) and CAD s/p atherectomy + SALLIE to D1(4/11/2024).  CAD with instent restenosis of LAD with D1 and LCx disease s/p CABG x 3 with free LIMA-LAD, vein to diag and Left PL POD 1    Left ventricular systolic and diastolic dysfunction  Severe tricuspid regurgitation, pulmonary HTN  Acute postoperative respiratory insufficiency  ESRD on iHD  Hyperkalemia  Acute blood loss anemia  Stress hyperglycemia    Plan:   ***Neuro***  Addressed analgesic regimen to optimize function. Continue Tylenol, Gabapentin, Dilaudid prn, and Oxycodone prn. Trazadone nightly for sleep.    ***Cardiovascular***  Patient admitted for AHRF found to have CAD s/p CABG x3 on 12/30. Inotropic support with IV Dobutamine infusion for acute systolic heart failure. Cardene and Lisinopril for afterload reduction and hypertension. Amiodarone for atrial fibrillation prophylaxis. Invasive hemodynamic monitoring with a PA catheter & an A-line were required for the following of serial CI's/MV02's and continuous central venous, pulmonary artery pressure and MAP/BP monitoring to ensure adequate cardiovascular support. ASA continued for graft occlusion-thromboembolism prophylaxis. Lipitor was also continued for long term graft patency.    ***Pulmonary***  Respiratory status required supplemental oxygen, close monitoring of breathing pattern and respiratory rate & the following of continuous pulse oximetry for support & to prevent decompensation.         ***Heme***  Monitor hemoglobin and hematocrit levels. Heparin continued for venous thromboembolism prophylaxis in addition to sequential compression devices.    ***GI***  Tolerating regular diet. Protonix for stress ulcer prophylaxis. Reglan for gut motility. Continue bowel regimen with Miralax and Senna.     ***Renal***  Optimize renal perfusion with adequate volume resuscitation and continued monitoring of urine output, fluid balance, electrolytes, and BUN/Creatinine. Patient with end stage renal disease receiving intermittent HD. Continue to maintain fluid balance as tolerated.      ***ID***  Afebrile, white blood count elevated. Continue trending white blood count and monitoring fever curve. IV Cefuroxime started for perioperative coverage.    ***Endocrine***  Metabolic stability, stress hyperglycemia required an IV regular Insulin drip while following serial glucose levels to help achieve and maintain euglycemia.          Patient requires continuous monitoring with bedside rhythm monitoring, pulse oximetry monitoring, and continuous central venous and arterial pressure monitoring; and intermittent blood gas analysis. Care plan discussed with the ICU care team.   Patient remained critical, at risk for life threatening decompensation.    I have spent 30 minutes providing critical care management to this patient.    By signing my name below, I, Baldemar Hernandez, attest that this documentation has been prepared under the direction and in the presence of Mari Gill MD   Electronically signed: Evelio Sevilla, 12-31-24 @ 08:49    I, Mari Gill, personally performed the services described in this documentation. All medical record entries made by the shubhamibdarlene were at my direction and in my presence. I have reviewed the chart and agree that the record reflects my personal performance and is accurate and complete  Electronically signed: Mari Gill MD  HPI:  54M with PMH of HTN, HLD, ESRD on HD MWF via LUE AVF, ascites (requiring repeat paracenteses q4-6wks), NICM with moderate LV dysfunction w/ EF 35-40%(2023) and CAD s/p atherectomy + SALLIE to D1(4/11/2024) presents to [facility] transferred from [facility]. Patient initially presented to [facility] ED with shortness of breath. Of note he was recently admitted from 09/27-12/02 for acute cholecystitis s/p cholecystectomy. In [facility] ED, pt was hypoxic to 86% on RA, trop 1.7K, EKG no new changes, CXR fluid overload. Admitted for AHRF 2/2 fluid overload. Pt received HD on 12/18, 12/19, 12/20 and 12/22. DAPT was resumed, TTE showed improvement in LVEF to 40-45%. Stress test was abnormal with 1mm inferior STD, reversible ischemia in the inferior wall and fixed defects in the lateral, anterior and apical walls with post stress EF of 24%. Pt was then transferred to [facility] for cardiac cath which showed pLAD 70% ISR, mLCx 70%, D1 severe dz. Patient now transferred to [facility] CTS Dr. Rivers for CABG eval. Patient denies any current SOB or chest pain on admission. Otherwise denies headaches, abdominal pain, urinary or bowel changes, fevers, chills, N/V/D, sick contacts.  (24 Dec 2024 05:07)    Patient underwent CABG x 3 12/30 with with free LIMA-LAD, vein to diag and Left PL.    Patient seen and examined at the bedside.    Remained critically ill on continuous ICU monitoring.    OBJECTIVE:  Vital Signs Last 24 Hrs  T(C): 36.6 (31 Dec 2024 08:00), Max: 36.8 (31 Dec 2024 04:00)  T(F): 97.9 (31 Dec 2024 08:00), Max: 98.2 (31 Dec 2024 04:00)  HR: 74 (31 Dec 2024 08:15) (60 - 90)  BP: 175/87 (30 Dec 2024 12:01) (175/87 - 175/87)  BP(mean): 113 (30 Dec 2024 12:01) (113 - 113)  RR: 10 (31 Dec 2024 08:15) (7 - 26)  SpO2: 97% (31 Dec 2024 08:15) (94% - 100%)    Parameters below as of 31 Dec 2024 08:00  Patient On (Oxygen Delivery Method): nasal cannula    O2 Concentration (%): 4    Physical Exam:   General: NAD   Neurology: nonfocal   Eyes: bilateral pupils equal and reactive   ENT/Neck: Neck supple, trachea midline, No JVD   Respiratory: Clear bilaterally   CV: S1S2, no murmurs        [x] Sternal dressing, [x] Mediastinal CT x2, [x] L Pleural CT        [x] Sinus rhythm  Abdominal: Soft, NT, ND +BS  Extremities: 1-2+ pedal edema noted, + peripheral pulses   Skin: No Rashes, Hematoma, Ecchymosis                         Assessment:   54 yr male HTN / HLD / ESRD on HD MWF via LUE AVF / ascites (requiring repeat paracenteses q4-6wks) / NICM with moderate LV dysfunction w/ EF 35-40%(2023) and CAD s/p atherectomy + SALLIE to D1(4/11/2024).  CAD with instent restenosis of LAD with D1 and LCx disease s/p CABG x 3 with free LIMA-LAD, vein to diag and Left PL POD 1    Left ventricular systolic and diastolic dysfunction  Severe tricuspid regurgitation, pulmonary HTN  Acute postoperative respiratory insufficiency  ESRD on iHD  Hyperkalemia  Acute blood loss anemia  Stress hyperglycemia    Plan:   ***Neuro***  Addressed analgesic regimen to optimize function. Continue Tylenol, Gabapentin, Dilaudid prn, and Oxycodone prn. Trazadone nightly for sleep.    ***Cardiovascular***  Patient admitted for AHRF found to have CAD s/p CABG x3 on 12/30. Inotropic support with IV Dobutamine infusion for acute systolic heart failure. Cardene and Lisinopril for afterload reduction and hypertension. Amiodarone for atrial fibrillation prophylaxis. Invasive hemodynamic monitoring with a PA catheter & an A-line were required for the following of serial CI's/MV02's and continuous central venous, pulmonary artery pressure and MAP/BP monitoring to ensure adequate cardiovascular support. ASA continued for graft occlusion-thromboembolism prophylaxis. Lipitor was also continued for long term graft patency.    ***Pulmonary***  Respiratory status required supplemental oxygen, close monitoring of breathing pattern and respiratory rate & the following of continuous pulse oximetry for support & to prevent decompensation.         ***Heme***  Monitor hemoglobin and hematocrit levels. Heparin continued for venous thromboembolism prophylaxis in addition to sequential compression devices.    ***GI***  Tolerating regular diet. Protonix for stress ulcer prophylaxis. Reglan for gut motility. Continue bowel regimen with Miralax and Senna.     ***Renal***  Optimize renal perfusion with adequate volume resuscitation and continued monitoring of urine output, fluid balance, electrolytes, and BUN/Creatinine. Patient with end stage renal disease receiving intermittent HD. Continue to maintain fluid balance as tolerated.      ***ID***  Afebrile, white blood count elevated. Continue trending white blood count and monitoring fever curve. IV Cefuroxime started for perioperative coverage.    ***Endocrine***  Metabolic stability, stress hyperglycemia required an IV regular Insulin drip while following serial glucose levels to help achieve and maintain euglycemia.          Patient requires continuous monitoring with bedside rhythm monitoring, pulse oximetry monitoring, pulmonary artery pressure, and continuous central venous and arterial pressure monitoring; and intermittent blood gas analysis. Care plan discussed with the ICU care team.   Patient remained critical, at risk for life threatening decompensation.    I have spent 30 minutes providing critical care management to this patient.    By signing my name below, I, Baldemar Hernandez, attest that this documentation has been prepared under the direction and in the presence of Mari Gill MD   Electronically signed: Evelio Sevilla, 12-31-24 @ 08:49    I, Mari Gill, personally performed the services described in this documentation. All medical record entries made by the shubhamibdarlene were at my direction and in my presence. I have reviewed the chart and agree that the record reflects my personal performance and is accurate and complete  Electronically signed: Mari Gill MD  HPI:  54M with PMH of HTN, HLD, ESRD on HD MWF via LUE AVF, ascites (requiring repeat paracenteses q4-6wks), NICM with moderate LV dysfunction w/ EF 35-40%(2023) and CAD s/p atherectomy + SALLIE to D1(4/11/2024) presents to [facility] transferred from [facility]. Patient initially presented to [facility] ED with shortness of breath. Of note he was recently admitted from 09/27-12/02 for acute cholecystitis s/p cholecystectomy. In [facility] ED, pt was hypoxic to 86% on RA, trop 1.7K, EKG no new changes, CXR fluid overload. Admitted for AHRF 2/2 fluid overload. Pt received HD on 12/18, 12/19, 12/20 and 12/22. DAPT was resumed, TTE showed improvement in LVEF to 40-45%. Stress test was abnormal with 1mm inferior STD, reversible ischemia in the inferior wall and fixed defects in the lateral, anterior and apical walls with post stress EF of 24%. Pt was then transferred to [facility] for cardiac cath which showed pLAD 70% ISR, mLCx 70%, D1 severe dz. Patient now transferred to [facility] CTS Dr. Rivers for CABG eval. Patient denies any current SOB or chest pain on admission. Otherwise denies headaches, abdominal pain, urinary or bowel changes, fevers, chills, N/V/D, sick contacts.  (24 Dec 2024 05:07)    Patient underwent CABG x 3 12/30 with with free LIMA-LAD, vein to diag and Left PL.    Patient seen and examined at the bedside.    Remained critically ill on continuous ICU monitoring.    OBJECTIVE:  Vital Signs Last 24 Hrs  T(C): 36.6 (31 Dec 2024 08:00), Max: 36.8 (31 Dec 2024 04:00)  T(F): 97.9 (31 Dec 2024 08:00), Max: 98.2 (31 Dec 2024 04:00)  HR: 74 (31 Dec 2024 08:15) (60 - 90)  BP: 175/87 (30 Dec 2024 12:01) (175/87 - 175/87)  BP(mean): 113 (30 Dec 2024 12:01) (113 - 113)  RR: 10 (31 Dec 2024 08:15) (7 - 26)  SpO2: 97% (31 Dec 2024 08:15) (94% - 100%)    Parameters below as of 31 Dec 2024 08:00  Patient On (Oxygen Delivery Method): nasal cannula    O2 Concentration (%): 4    Physical Exam:   General: Sluggish, complaining of pain, OOB to chair  Neurology: nonfocal   Eyes: bilateral pupils equal and reactive   ENT/Neck: Neck supple, trachea midline, No JVD   Respiratory: Clear bilaterally   CV: S1S2, positive friction rub        [x] Sternal dressing, [x] Mediastinal CT x2, [x] L Pleural CT        [x] Sinus rhythm  Abdominal: Soft, mildly distended, NT, ND +BS  Extremities: 1+ pedal edema noted, + peripheral pulses   Skin: No Rashes, Hematoma, Ecchymosis                         Assessment:   54 yr male HTN / HLD / ESRD on HD MWF via LUE AVF / ascites (requiring repeat paracenteses q4-6wks) / NICM with moderate LV dysfunction w/ EF 35-40%(2023) and CAD s/p atherectomy + SALLIE to D1(4/11/2024).  CAD with instent restenosis of LAD with D1 and LCx disease s/p CABG x 3 with free LIMA-LAD, vein to diag and Left PL POD 1    Left ventricular systolic and diastolic dysfunction  Severe tricuspid regurgitation, pulmonary HTN  Acute postoperative respiratory insufficiency  ESRD on iHD  Hyperkalemia  Acute blood loss anemia  Stress hyperglycemia    Plan:   ***Neuro***  Addressed analgesic regimen to optimize function. Continue Tylenol, Gabapentin, Dilaudid prn, and Oxycodone prn. Trazadone resumed nightly for sleep.    ***Cardiovascular***  Patient admitted for SOB and fluid overload and was found to have CAD s/p CABG x3 on 12/30. Patient managed post op with an IV Dobutamine infusion for acute on chronic systolic heart failure. In addition, patient has required an IV Nicardipine infusion for afterload reduction and hypertension. Lisinopril ordered this morning in order to wean Dobutamine and Nicardipine. Will consider adding preop Coreg once stable off of Dobutamine. Amiodarone for atrial fibrillation prophylaxis. Invasive hemodynamic monitoring with a PA catheter & an A-line were required for the following of serial CI's/MV02's and continuous central venous, pulmonary artery pressure and MAP/BP monitoring to ensure adequate cardiovascular support. PAC now removed with ongoing CVP and MAP monitoring. ASA continued for graft occlusion-thromboembolism prophylaxis. Will discuss with Dr. Rivers whether to continue Plavix. Lipitor was also ordered for long term graft patency.    ***Pulmonary***  Respiratory status required supplemental oxygen, close monitoring of breathing pattern and respiratory rate & the following of continuous pulse oximetry for support & to prevent decompensation.         ***Heme***  Anemia due to acute blood loss. No current plan for transfusion. Monitor hemoglobin and hematocrit levels. Heparin sq ordered for venous thromboembolism prophylaxis in addition to sequential compression devices.    ***GI***  Tolerating regular diet. Protonix for stress ulcer prophylaxis. Reglan for gut motility. Continue bowel regimen with Miralax and Senna. History of ascites of unclear etiology. Has previously required intermittent paracentesis. Patient may have a component of liver disease with remote drinking history vs. heart failure. No current ascites. Patient has recovered from recent hospitalization for cholecystitis and subsequent cholecystectomy.     ***Renal***  Patient with end stage renal disease receiving intermittent HD. Continued monitoring of fluid balance, electrolytes, pH and BUN/Creatinine. Patient underwent HD with fluid removal 12/29 and for clearance only post op due to hyperkalemia. I think he does not need HD today, but will continue to assess and follow up with nephrology since he was originally scheduled for today. Phoslo resumed.     ***ID***  Afebrile, white blood count elevated. Continue trending white blood count and monitoring fever curve. IV Cefuroxime started for perioperative coverage.    ***Endocrine***  Metabolic stability, stress hyperglycemia required an IV regular Insulin drip while following serial glucose levels to help achieve and maintain euglycemia.          Patient requires continuous monitoring with bedside rhythm monitoring, pulse oximetry monitoring, pulmonary artery pressure, and continuous central venous and arterial pressure monitoring; and intermittent blood gas analysis. Care plan discussed with the ICU care team.   Patient remained critical, at risk for life threatening decompensation.    I have spent 35 minutes providing critical care management to this patient.    By signing my name below, I, Baldemar Hernandez, attest that this documentation has been prepared under the direction and in the presence of Mari Gill MD   Electronically signed: Evelio Sevilla, 12-31-24 @ 08:49    I, Mari Gill, personally performed the services described in this documentation. All medical record entries made by the scribe were at my direction and in my presence. I have reviewed the chart and agree that the record reflects my personal performance and is accurate and complete  Electronically signed: Mari Gill MD

## 2025-01-01 ENCOUNTER — RESULT REVIEW (OUTPATIENT)
Age: 55
End: 2025-01-01

## 2025-01-01 LAB
ALBUMIN SERPL ELPH-MCNC: 3.2 G/DL — LOW (ref 3.3–5)
ALBUMIN SERPL ELPH-MCNC: 3.4 G/DL — SIGNIFICANT CHANGE UP (ref 3.3–5)
ALT FLD-CCNC: 21 U/L — SIGNIFICANT CHANGE UP (ref 10–45)
ALT FLD-CCNC: 562 U/L — HIGH (ref 10–45)
ANION GAP SERPL CALC-SCNC: 13 MMOL/L — SIGNIFICANT CHANGE UP (ref 5–17)
ANION GAP SERPL CALC-SCNC: 15 MMOL/L — SIGNIFICANT CHANGE UP (ref 5–17)
APTT BLD: 30.6 SEC — SIGNIFICANT CHANGE UP (ref 24.5–35.6)
AST SERPL-CCNC: 719 U/L — HIGH (ref 10–40)
BASE EXCESS BLDV CALC-SCNC: -1.8 MMOL/L — SIGNIFICANT CHANGE UP (ref -2–3)
BASOPHILS # BLD AUTO: 0.08 K/UL — SIGNIFICANT CHANGE UP (ref 0–0.2)
BASOPHILS NFR BLD AUTO: 0.6 % — SIGNIFICANT CHANGE UP (ref 0–2)
BILIRUB SERPL-MCNC: 0.4 MG/DL — SIGNIFICANT CHANGE UP (ref 0.2–1.2)
BLD GP AB SCN SERPL QL: NEGATIVE — SIGNIFICANT CHANGE UP
BUN SERPL-MCNC: 27 MG/DL — HIGH (ref 7–23)
BUN SERPL-MCNC: 39 MG/DL — HIGH (ref 7–23)
CALCIUM SERPL-MCNC: 8.5 MG/DL — SIGNIFICANT CHANGE UP (ref 8.4–10.5)
CALCIUM SERPL-MCNC: 9 MG/DL — SIGNIFICANT CHANGE UP (ref 8.4–10.5)
CHLORIDE SERPL-SCNC: 98 MMOL/L — SIGNIFICANT CHANGE UP (ref 96–108)
CHLORIDE SERPL-SCNC: 98 MMOL/L — SIGNIFICANT CHANGE UP (ref 96–108)
CO2 BLDV-SCNC: 28 MMOL/L — HIGH (ref 22–26)
CO2 SERPL-SCNC: 23 MMOL/L — SIGNIFICANT CHANGE UP (ref 22–31)
CO2 SERPL-SCNC: 24 MMOL/L — SIGNIFICANT CHANGE UP (ref 22–31)
CREAT SERPL-MCNC: 3.71 MG/DL — HIGH (ref 0.5–1.3)
CREAT SERPL-MCNC: 4.67 MG/DL — HIGH (ref 0.5–1.3)
EGFR: 14 ML/MIN/1.73M2 — LOW
EGFR: 14 ML/MIN/1.73M2 — LOW
EGFR: 18 ML/MIN/1.73M2 — LOW
EGFR: 18 ML/MIN/1.73M2 — LOW
EOSINOPHIL # BLD AUTO: 0.92 K/UL — HIGH (ref 0–0.5)
EOSINOPHIL NFR BLD AUTO: 6.3 % — HIGH (ref 0–6)
FIBRINOGEN PPP-MCNC: 301 MG/DL — SIGNIFICANT CHANGE UP (ref 200–445)
GAS PNL BLDA: SIGNIFICANT CHANGE UP
GAS PNL BLDV: SIGNIFICANT CHANGE UP
GLUCOSE SERPL-MCNC: 151 MG/DL — HIGH (ref 70–99)
GLUCOSE SERPL-MCNC: 84 MG/DL — SIGNIFICANT CHANGE UP (ref 70–99)
HCO3 BLDV-SCNC: 26 MMOL/L — SIGNIFICANT CHANGE UP (ref 22–29)
HCT VFR BLD CALC: 25.7 % — LOW (ref 39–50)
HGB BLD-MCNC: 8.2 G/DL — LOW (ref 13–17)
HOROWITZ INDEX BLDV+IHG-RTO: 32 — SIGNIFICANT CHANGE UP
IMM GRANULOCYTES NFR BLD AUTO: 0.7 % — SIGNIFICANT CHANGE UP (ref 0–0.9)
INR BLD: 1.41 RATIO — HIGH (ref 0.85–1.16)
LYMPHOCYTES # BLD AUTO: 0.67 K/UL — LOW (ref 1–3.3)
LYMPHOCYTES # BLD AUTO: 4.6 % — LOW (ref 13–44)
MAGNESIUM SERPL-MCNC: 2.1 MG/DL — SIGNIFICANT CHANGE UP (ref 1.6–2.6)
MCHC RBC-ENTMCNC: 30.7 PG — SIGNIFICANT CHANGE UP (ref 27–34)
MCHC RBC-ENTMCNC: 31.9 G/DL — LOW (ref 32–36)
MCV RBC AUTO: 96.3 FL — SIGNIFICANT CHANGE UP (ref 80–100)
MONOCYTES # BLD AUTO: 1.45 K/UL — HIGH (ref 0–0.9)
MONOCYTES NFR BLD AUTO: 10 % — SIGNIFICANT CHANGE UP (ref 2–14)
NEUTROPHILS # BLD AUTO: 11.27 K/UL — HIGH (ref 1.8–7.4)
NEUTROPHILS NFR BLD AUTO: 77.8 % — HIGH (ref 43–77)
NRBC # BLD: 0 /100 WBCS — SIGNIFICANT CHANGE UP (ref 0–0)
NRBC BLD-RTO: 0 /100 WBCS — SIGNIFICANT CHANGE UP (ref 0–0)
PCO2 BLDV: 55 MMHG — SIGNIFICANT CHANGE UP (ref 42–55)
PH BLDV: 7.28 — LOW (ref 7.32–7.43)
PHOSPHATE SERPL-MCNC: 4.3 MG/DL — SIGNIFICANT CHANGE UP (ref 2.5–4.5)
PLATELET # BLD AUTO: 245 K/UL — SIGNIFICANT CHANGE UP (ref 150–400)
PO2 BLDV: 43 MMHG — SIGNIFICANT CHANGE UP (ref 25–45)
POTASSIUM SERPL-MCNC: 4.3 MMOL/L — SIGNIFICANT CHANGE UP (ref 3.5–5.3)
POTASSIUM SERPL-MCNC: 5 MMOL/L — SIGNIFICANT CHANGE UP (ref 3.5–5.3)
POTASSIUM SERPL-SCNC: 4.3 MMOL/L — SIGNIFICANT CHANGE UP (ref 3.5–5.3)
POTASSIUM SERPL-SCNC: 5 MMOL/L — SIGNIFICANT CHANGE UP (ref 3.5–5.3)
PROT SERPL-MCNC: 6.1 G/DL — SIGNIFICANT CHANGE UP (ref 6–8.3)
PROT SERPL-MCNC: 6.3 G/DL — SIGNIFICANT CHANGE UP (ref 6–8.3)
PROTHROM AB SERPL-ACNC: 16.2 SEC — HIGH (ref 9.9–13.4)
RBC # BLD: 2.67 M/UL — LOW (ref 4.2–5.8)
RBC # FLD: 19.4 % — HIGH (ref 10.3–14.5)
RH IG SCN BLD-IMP: POSITIVE — SIGNIFICANT CHANGE UP
SAO2 % BLDV: 70.9 % — SIGNIFICANT CHANGE UP (ref 67–88)
SODIUM SERPL-SCNC: 134 MMOL/L — LOW (ref 135–145)
SODIUM SERPL-SCNC: 137 MMOL/L — SIGNIFICANT CHANGE UP (ref 135–145)
WBC # BLD: 14.49 K/UL — HIGH (ref 3.8–10.5)
WBC # FLD AUTO: 14.49 K/UL — HIGH (ref 3.8–10.5)

## 2025-01-01 PROCEDURE — 99291 CRITICAL CARE FIRST HOUR: CPT

## 2025-01-01 PROCEDURE — 93321 DOPPLER ECHO F-UP/LMTD STD: CPT | Mod: 26

## 2025-01-01 PROCEDURE — 93308 TTE F-UP OR LMTD: CPT | Mod: 26

## 2025-01-01 PROCEDURE — 71045 X-RAY EXAM CHEST 1 VIEW: CPT | Mod: 26

## 2025-01-01 PROCEDURE — 93325 DOPPLER ECHO COLOR FLOW MAPG: CPT | Mod: 26

## 2025-01-01 RX ORDER — EPOETIN ALFA 10000 [IU]/ML
10000 SOLUTION INTRAVENOUS; SUBCUTANEOUS ONCE
Refills: 0 | Status: COMPLETED | OUTPATIENT
Start: 2025-01-01 | End: 2025-01-01

## 2025-01-01 RX ORDER — NOREPINEPHRINE BITARTRATE 8 MG
0.05 SOLUTION INTRAVENOUS
Qty: 8 | Refills: 0 | Status: DISCONTINUED | OUTPATIENT
Start: 2025-01-01 | End: 2025-01-04

## 2025-01-01 RX ORDER — LIDOCAINE HCL/PF 10 MG/ML
10 VIAL (ML) INJECTION ONCE
Refills: 0 | Status: COMPLETED | OUTPATIENT
Start: 2025-01-01 | End: 2025-01-01

## 2025-01-01 RX ORDER — ACETAMINOPHEN 500 MG/5ML
1000 LIQUID (ML) ORAL ONCE
Refills: 0 | Status: COMPLETED | OUTPATIENT
Start: 2025-01-01 | End: 2025-01-01

## 2025-01-01 RX ORDER — INSULIN LISPRO 100 U/ML
INJECTION, SOLUTION INTRAVENOUS; SUBCUTANEOUS AT BEDTIME
Refills: 0 | Status: DISCONTINUED | OUTPATIENT
Start: 2025-01-01 | End: 2025-01-06

## 2025-01-01 RX ORDER — INSULIN LISPRO 100 U/ML
INJECTION, SOLUTION INTRAVENOUS; SUBCUTANEOUS
Refills: 0 | Status: DISCONTINUED | OUTPATIENT
Start: 2025-01-01 | End: 2025-01-06

## 2025-01-01 RX ORDER — DOBUTAMINE 250 MG/20ML
7.5 INJECTION INTRAVENOUS
Qty: 500 | Refills: 0 | Status: DISCONTINUED | OUTPATIENT
Start: 2025-01-01 | End: 2025-01-04

## 2025-01-01 RX ORDER — DEXTROSE 50 % IN WATER 50 %
50 SYRINGE (ML) INTRAVENOUS ONCE
Refills: 0 | Status: COMPLETED | OUTPATIENT
Start: 2025-01-01 | End: 2025-01-01

## 2025-01-01 RX ORDER — HYDROMORPHONE/SOD CHLOR,ISO/PF 2 MG/10 ML
0.5 SYRINGE (ML) INJECTION ONCE
Refills: 0 | Status: DISCONTINUED | OUTPATIENT
Start: 2025-01-01 | End: 2025-01-01

## 2025-01-01 RX ORDER — SODIUM ZIRCONIUM CYCLOSILICATE 5 G/5G
10 POWDER, FOR SUSPENSION ORAL ONCE
Refills: 0 | Status: COMPLETED | OUTPATIENT
Start: 2025-01-01 | End: 2025-01-01

## 2025-01-01 RX ORDER — CALCIUM GLUCONATE 20 MG/ML
2 INJECTION, SOLUTION INTRAVENOUS ONCE
Refills: 0 | Status: COMPLETED | OUTPATIENT
Start: 2025-01-01 | End: 2025-01-01

## 2025-01-01 RX ORDER — VASOPRESSIN 20 [USP'U]/ML
0.03 INJECTION INTRAVENOUS
Qty: 40 | Refills: 0 | Status: DISCONTINUED | OUTPATIENT
Start: 2025-01-01 | End: 2025-01-12

## 2025-01-01 RX ORDER — METHOCARBAMOL 500 MG/1
500 TABLET, FILM COATED ORAL EVERY 8 HOURS
Refills: 0 | Status: DISCONTINUED | OUTPATIENT
Start: 2025-01-01 | End: 2025-01-05

## 2025-01-01 RX ORDER — LIDOCAINE HYDROCHLORIDE 20 MG/ML
2 JELLY TOPICAL DAILY
Refills: 0 | Status: DISCONTINUED | OUTPATIENT
Start: 2025-01-01 | End: 2025-01-15

## 2025-01-01 RX ORDER — EPOETIN ALFA 10000 [IU]/ML
10000 SOLUTION INTRAVENOUS; SUBCUTANEOUS
Refills: 0 | Status: DISCONTINUED | OUTPATIENT
Start: 2025-01-01 | End: 2025-03-19

## 2025-01-01 RX ADMIN — Medication 5 MILLIGRAM(S): at 05:28

## 2025-01-01 RX ADMIN — LIDOCAINE HYDROCHLORIDE 2 PATCH: 20 JELLY TOPICAL at 17:31

## 2025-01-01 RX ADMIN — Medication 650 MILLIGRAM(S): at 23:44

## 2025-01-01 RX ADMIN — Medication 40 MILLIGRAM(S): at 07:54

## 2025-01-01 RX ADMIN — Medication 0.5 MILLIGRAM(S): at 12:01

## 2025-01-01 RX ADMIN — Medication 0.5 MILLIGRAM(S): at 06:39

## 2025-01-01 RX ADMIN — HEPARIN SODIUM 5000 UNIT(S): 1000 INJECTION INTRAVENOUS; SUBCUTANEOUS at 05:27

## 2025-01-01 RX ADMIN — Medication 50 MILLILITER(S): at 09:02

## 2025-01-01 RX ADMIN — Medication 2 TABLET(S): at 21:05

## 2025-01-01 RX ADMIN — Medication 667 MILLIGRAM(S): at 07:54

## 2025-01-01 RX ADMIN — OXYCODONE HYDROCHLORIDE 10 MILLIGRAM(S): 30 TABLET ORAL at 21:13

## 2025-01-01 RX ADMIN — Medication 0.5 MILLIGRAM(S): at 11:48

## 2025-01-01 RX ADMIN — CALCIUM GLUCONATE 200 GRAM(S): 20 INJECTION, SOLUTION INTRAVENOUS at 17:09

## 2025-01-01 RX ADMIN — INSULIN LISPRO 2: 100 INJECTION, SOLUTION INTRAVENOUS; SUBCUTANEOUS at 17:13

## 2025-01-01 RX ADMIN — Medication 400 MILLIGRAM(S): at 11:49

## 2025-01-01 RX ADMIN — Medication 15 MILLILITER(S): at 17:08

## 2025-01-01 RX ADMIN — Medication 650 MILLIGRAM(S): at 17:09

## 2025-01-01 RX ADMIN — Medication 1 TABLET(S): at 11:49

## 2025-01-01 RX ADMIN — ATORVASTATIN CALCIUM 80 MILLIGRAM(S): 80 TABLET, FILM COATED ORAL at 11:49

## 2025-01-01 RX ADMIN — GABAPENTIN 100 MILLIGRAM(S): 400 CAPSULE ORAL at 13:00

## 2025-01-01 RX ADMIN — VASOPRESSIN 4.5 UNIT(S)/MIN: 20 INJECTION INTRAVENOUS at 20:13

## 2025-01-01 RX ADMIN — Medication 500 MILLIGRAM(S): at 05:28

## 2025-01-01 RX ADMIN — METHOCARBAMOL 500 MILLIGRAM(S): 500 TABLET, FILM COATED ORAL at 11:49

## 2025-01-01 RX ADMIN — INSULIN LISPRO 5: 100 INJECTION, SOLUTION INTRAVENOUS; SUBCUTANEOUS at 12:32

## 2025-01-01 RX ADMIN — Medication 100 MILLIGRAM(S): at 02:32

## 2025-01-01 RX ADMIN — Medication 500 MILLIGRAM(S): at 17:08

## 2025-01-01 RX ADMIN — OXYCODONE HYDROCHLORIDE 10 MILLIGRAM(S): 30 TABLET ORAL at 20:13

## 2025-01-01 RX ADMIN — Medication 0.5 MILLIGRAM(S): at 00:46

## 2025-01-01 RX ADMIN — NOREPINEPHRINE BITARTRATE 7.98 MICROGRAM(S)/KG/MIN: 8 SOLUTION at 20:13

## 2025-01-01 RX ADMIN — GABAPENTIN 100 MILLIGRAM(S): 400 CAPSULE ORAL at 21:04

## 2025-01-01 RX ADMIN — Medication 0.5 MILLIGRAM(S): at 17:31

## 2025-01-01 RX ADMIN — LIDOCAINE HYDROCHLORIDE 2 PATCH: 20 JELLY TOPICAL at 23:30

## 2025-01-01 RX ADMIN — Medication 650 MILLIGRAM(S): at 06:37

## 2025-01-01 RX ADMIN — Medication 81 MILLIGRAM(S): at 11:48

## 2025-01-01 RX ADMIN — Medication 0.5 MILLIGRAM(S): at 07:26

## 2025-01-01 RX ADMIN — Medication 1000 MILLIGRAM(S): at 12:01

## 2025-01-01 RX ADMIN — Medication 667 MILLIGRAM(S): at 11:48

## 2025-01-01 RX ADMIN — GABAPENTIN 100 MILLIGRAM(S): 400 CAPSULE ORAL at 05:27

## 2025-01-01 RX ADMIN — METHOCARBAMOL 500 MILLIGRAM(S): 500 TABLET, FILM COATED ORAL at 21:04

## 2025-01-01 RX ADMIN — Medication 650 MILLIGRAM(S): at 17:31

## 2025-01-01 RX ADMIN — Medication 5 MILLIGRAM(S): at 13:01

## 2025-01-01 RX ADMIN — HEPARIN SODIUM 5000 UNIT(S): 1000 INJECTION INTRAVENOUS; SUBCUTANEOUS at 17:08

## 2025-01-01 RX ADMIN — DOBUTAMINE 6.38 MICROGRAM(S)/KG/MIN: 250 INJECTION INTRAVENOUS at 20:13

## 2025-01-01 RX ADMIN — LIDOCAINE HYDROCHLORIDE 2 PATCH: 20 JELLY TOPICAL at 10:32

## 2025-01-01 RX ADMIN — EPOETIN ALFA 10000 UNIT(S): 10000 SOLUTION INTRAVENOUS; SUBCUTANEOUS at 11:41

## 2025-01-01 RX ADMIN — Medication 650 MILLIGRAM(S): at 00:16

## 2025-01-01 RX ADMIN — Medication 0.5 MILLIGRAM(S): at 21:14

## 2025-01-01 RX ADMIN — Medication 0.5 MILLIGRAM(S): at 22:14

## 2025-01-01 RX ADMIN — Medication 0.5 MILLIGRAM(S): at 17:08

## 2025-01-01 RX ADMIN — Medication 15 MILLILITER(S): at 05:29

## 2025-01-01 RX ADMIN — POLYETHYLENE GLYCOL 3350 17 GRAM(S): 17 POWDER, FOR SOLUTION ORAL at 11:49

## 2025-01-01 RX ADMIN — Medication 10 MILLILITER(S): at 13:33

## 2025-01-01 RX ADMIN — Medication 100 MILLIGRAM(S): at 21:04

## 2025-01-01 RX ADMIN — CALCIUM GLUCONATE 200 GRAM(S): 20 INJECTION, SOLUTION INTRAVENOUS at 09:02

## 2025-01-01 RX ADMIN — Medication 650 MILLIGRAM(S): at 05:27

## 2025-01-01 RX ADMIN — Medication 667 MILLIGRAM(S): at 17:08

## 2025-01-01 RX ADMIN — AMIODARONE HYDROCHLORIDE 400 MILLIGRAM(S): 50 INJECTION, SOLUTION INTRAVENOUS at 05:28

## 2025-01-01 RX ADMIN — SODIUM ZIRCONIUM CYCLOSILICATE 10 GRAM(S): 5 POWDER, FOR SUSPENSION ORAL at 17:09

## 2025-01-01 NOTE — PROGRESS NOTE ADULT - SUBJECTIVE AND OBJECTIVE BOX
POD #     Brief history : 53yo with multiple medical problems including CAD s/p PCI in 2024    PMH: ESRD on HD, CAD s/p PCI , HTN, recurrent ascities requiring paracenthesis Q 4-6 wks, HLD s/p Acute darryl s/p cholecystectomy - admitted with acute SOB, Elevated trop (NSTEMI)  Echo showed newly depressed EF severe TR EF 35%   LHC ISR LAD/D1, LCx      CABG X 3  free LIMA-LAD; SVG-Diag; SVG-left PL  post ECHO EF 45 % on      24 hour events :  hypotension  Echo showed EF 25-30%   Dobutamine restarted   s/p 1 unit of PRBC    ICU Vital Signs Last 24 Hrs  T(C): 36.9 (25 @ 16:00), Max: 37.2 (25 @ 04:00)  T(F): 98.4 (25 @ 16:00), Max: 99 (25 @ 04:00)  HR: 66 (25 @ 18:00) (59 - 83)  BP: 92/51 (25 @ 13:45) (80/53 - 103/73)  BP(mean): 66 (25 @ 13:45) (61 - 84)  ABP: 101/44 (25 @ 18:00) (60/31 - 122/42)  ABP(mean): 60 (25 @ 18:00) (41 - 71)  RR: 5 (25 @ 18:00) (5 - 19)  SpO2: 97% (25 @ 18:00) (87% - 100%)    I&O's Summary    31 Dec 2024 07:01  -  2025 07:00  --------------------------------------------------------  IN: 1115.5 mL / OUT: 1100 mL / NET: 15.5 mL    2025 07:01  -  2025 18:18  --------------------------------------------------------  IN: 1043.2 mL / OUT: 70 mL / NET: 973.2 mL      ABG - ( 2025 17:25 )  pH: 7.30  /  pCO2: 51    /  pO2: 93    / HCO3: 25    / Base Excess: -1.5  /  SaO2: 98.7                            8.2    14.49 )-----------( 245      ( 2025 00:24 )             25.7     2025 00:24    137    |  98     |  27     ----------------------------<  84     4.3     |  24     |  3.71     Ca    8.5        2025 00:24  Phos  4.3       2025 00:24  Mg     2.1       2025 00:24    TPro  6.3    /  Alb  3.2    /  TBili  0.4    /  DBili  x      /  AST  37     /  ALT  21     /  AlkPhos  92     2025 00:24    PT/INR - ( 2025 00:24 )   PT: 16.2 sec;   INR: 1.41 ratio    PTT - ( 2025 00:24 )  PTT:30.6 sec      MEDICATIONS  (STANDING):  acetaminophen     Tablet .. 650 milliGRAM(s) Oral every 6 hours  gabapentin 100 milliGRAM(s) Oral every 8 hours  traZODone 100 milliGRAM(s) Oral at bedtime  lidocaine   4% Patch 2 Patch Transdermal daily  methocarbamol 500 milliGRAM(s) Oral every 8 hourslidocaine   4% Patch 2 Patch Transdermal daily  methocarbamol 500 milliGRAM(s) Oral every 8 hours    ascorbic acid 500 milliGRAM(s) Oral two times a day  aspirin enteric coated 81 milliGRAM(s) Oral daily  atorvastatin 80 milliGRAM(s) Oral daily  DOBUTamine Infusion 2.5 MICROgram(s)/kG/Min IV Continuous <Continuous>  vasopressin Infusion 0.03 Unit(s)/Min IV Continuous <Continuous>    calcium acetate 667 milliGRAM(s) Oral three times a day with meals  epoetin ignacia (EPOGEN) Injectable 43239 Unit(s) IV Push <User Schedule>  gabapentin 100 milliGRAM(s) Oral every 8 hours  heparin   Injectable 5000 Unit(s) SubCutaneous every 12 hours    insulin lispro (ADMELOG) corrective regimen sliding scale   SubCutaneous three times a day before meals  insulin lispro (ADMELOG) corrective regimen sliding scale   SubCutaneous at bedtime  Nephro-elicia 1 Tablet(s) Oral daily    pantoprazole    Tablet 40 milliGRAM(s) Oral before breakfast  polyethylene glycol 3350 17 Gram(s) Oral daily  senna 2 Tablet(s) Oral at bedtime      PHYSICAL EXAM:  Gen : no acute distress  Neck: No LAD, No JVD  Respiratory: decreased in the bases  Cardiovascular: S1 and S2, RRR, no M/G/R  Gastrointestinal: BS+, soft, NT/ND  Extremities: No peripheral edema  Vascular: 2+ peripheral pulses  Neurological: A/O x 3, no focal deficits  Incision: clean dry/ no sign of infection  Lines: no sign of infection      S/p CABG X3   Acute post operative respiratory insufficiency  Acute blood loss anemia/Thrombocytopenia  Electrolyte abnormalities - Hypomag/Hypokalcemia  Post operative pain   ESRD on HD      Neuro  Neuro assessment  Multimodal pain management    CVS   s/p   EF  Pressors -   Inotrope -   MCS -   Rhythm  Chest tube management    Pulm   Ventilator weaning as tolerated   Fast track extubation     GI   GI proph/Bowel regimen       Monitor UOP  Correct Electrolytes K >4.0-4.5 Mag 2.0-2.5    Endo   A1c  Glycemic control per protocl Glucose <180  Thyroid     Heme   Correct coagulapathy, monitor for bleeding  ASA  P2Y12  DVT proph  Acute blood loss anemia expected post operative loss - IV Fe, Epo    ID   Periop antibiotics  MRSA screen         Critical care time spent    min    POD #     Brief history : 55yo with multiple medical problems including CAD s/p PCI in 2024    PMH: ESRD on HD, CAD s/p PCI , HTN, recurrent ascities requiring paracenthesis Q 4-6 wks, HLD s/p Acute darryl s/p cholecystectomy - admitted with acute SOB, Elevated trop (NSTEMI)  Echo showed newly depressed EF severe TR EF 35%   LHC ISR LAD/D1, LCx      CABG X 3  free LIMA-LAD; SVG-Diag; SVG-left PL  post ECHO EF 45 % on      weaned     24 hour events :  hypotension  Echo showed EF 25-30%   Dobutamine restarted   s/p 1 unit of PRBC    ICU Vital Signs Last 24 Hrs  T(C): 36.9 (25 @ 16:00), Max: 37.2 (25 @ 04:00)  T(F): 98.4 (25 @ 16:00), Max: 99 (25 @ 04:00)  HR: 66 (25 @ 18:00) (59 - 83)  BP: 92/51 (25 @ 13:45) (80/53 - 103/73)  BP(mean): 66 (25 @ 13:45) (61 - 84)  ABP: 101/44 (25 @ 18:00) (60/31 - 122/42)  ABP(mean): 60 (25 @ 18:00) (41 - 71)  RR: 5 (25 @ 18:00) (5 - 19)  SpO2: 97% (25 @ 18:00) (87% - 100%)    I&O's Summary    31 Dec 2024 07:01  -  2025 07:00  --------------------------------------------------------  IN: 1115.5 mL / OUT: 1100 mL / NET: 15.5 mL    2025 07:01  -  2025 18:18  --------------------------------------------------------  IN: 1043.2 mL / OUT: 70 mL / NET: 973.2 mL      ABG - ( 2025 17:25 )  pH: 7.30  /  pCO2: 51    /  pO2: 93    / HCO3: 25    / Base Excess: -1.5  /  SaO2: 98.7                            8.2    14.49 )-----------( 245      ( 2025 00:24 )             25.7     2025 00:24    137    |  98     |  27     ----------------------------<  84     4.3     |  24     |  3.71     Ca    8.5        2025 00:24  Phos  4.3       2025 00:24  Mg     2.1       2025 00:24    TPro  6.3    /  Alb  3.2    /  TBili  0.4    /  DBili  x      /  AST  37     /  ALT  21     /  AlkPhos  92     2025 00:24    PT/INR - ( 2025 00:24 )   PT: 16.2 sec;   INR: 1.41 ratio    PTT - ( 2025 00:24 )  PTT:30.6 sec      MEDICATIONS  (STANDING):  acetaminophen     Tablet .. 650 milliGRAM(s) Oral every 6 hours  gabapentin 100 milliGRAM(s) Oral every 8 hours  traZODone 100 milliGRAM(s) Oral at bedtime  lidocaine   4% Patch 2 Patch Transdermal daily  methocarbamol 500 milliGRAM(s) Oral every 8 hourslidocaine   4% Patch 2 Patch Transdermal daily  methocarbamol 500 milliGRAM(s) Oral every 8 hours    ascorbic acid 500 milliGRAM(s) Oral two times a day  aspirin enteric coated 81 milliGRAM(s) Oral daily  atorvastatin 80 milliGRAM(s) Oral daily  DOBUTamine Infusion 2.5 MICROgram(s)/kG/Min IV Continuous <Continuous>  vasopressin Infusion 0.03 Unit(s)/Min IV Continuous <Continuous>    calcium acetate 667 milliGRAM(s) Oral three times a day with meals  epoetin ignacia (EPOGEN) Injectable 34921 Unit(s) IV Push <User Schedule>  gabapentin 100 milliGRAM(s) Oral every 8 hours  heparin   Injectable 5000 Unit(s) SubCutaneous every 12 hours    insulin lispro (ADMELOG) corrective regimen sliding scale   SubCutaneous three times a day before meals  insulin lispro (ADMELOG) corrective regimen sliding scale   SubCutaneous at bedtime  Nephro-elicia 1 Tablet(s) Oral daily    pantoprazole    Tablet 40 milliGRAM(s) Oral before breakfast  polyethylene glycol 3350 17 Gram(s) Oral daily  senna 2 Tablet(s) Oral at bedtime      PHYSICAL EXAM:  Gen : no acute distress  Neck: No LAD, No JVD  Respiratory: decreased in the bases  Cardiovascular: S1 and S2, RRR, no M/G/R  Gastrointestinal: BS+, soft, NT/ND  Extremities: No peripheral edema  Vascular: 2+ peripheral pulses  Neurological: A/O x 3, no focal deficits  Incision: clean dry/ no sign of infection  Lines: no sign of infection      S/p CABG X3   Acute post operative respiratory insufficiency  Acute blood loss anemia/Thrombocytopenia  Electrolyte abnormalities - Hypomag/Hypokalcemia  Post operative pain   ESRD on HD    Plan   POD # 2 s/p CABG  X 3    now with worsening       Critical care time spent    min    POD #     Brief history : 53yo with multiple medical problems including CAD s/p PCI in 2024    PMH: ESRD on HD, CAD s/p PCI , HTN, recurrent ascities requiring paracenthesis Q 4-6 wks, HLD s/p Acute darryl s/p cholecystectomy - admitted with acute SOB, Elevated trop (NSTEMI)  Echo showed newly depressed EF severe TR EF 35%   LHC ISR LAD/D1, LCx      CABG X 3  free LIMA-LAD; SVG-Diag; SVG-left PL  post ECHO EF 45 % on      weaned     24 hour events :  hypotension  Echo showed EF 25-30%   Dobutamine restarted   s/p 1 unit of PRBC    ICU Vital Signs Last 24 Hrs  T(C): 36.9 (25 @ 16:00), Max: 37.2 (25 @ 04:00)  T(F): 98.4 (25 @ 16:00), Max: 99 (25 @ 04:00)  HR: 66 (25 @ 18:00) (59 - 83)  BP: 92/51 (25 @ 13:45) (80/53 - 103/73)  BP(mean): 66 (25 @ 13:45) (61 - 84)  ABP: 101/44 (25 @ 18:00) (60/31 - 122/42)  ABP(mean): 60 (25 @ 18:00) (41 - 71)  RR: 5 (25 @ 18:00) (5 - 19)  SpO2: 97% (25 @ 18:00) (87% - 100%)    I&O's Summary    31 Dec 2024 07:01  -  2025 07:00  --------------------------------------------------------  IN: 1115.5 mL / OUT: 1100 mL / NET: 15.5 mL    2025 07:01  -  2025 18:18  --------------------------------------------------------  IN: 1043.2 mL / OUT: 70 mL / NET: 973.2 mL      ABG - ( 2025 17:25 )  pH: 7.30  /  pCO2: 51    /  pO2: 93    / HCO3: 25    / Base Excess: -1.5  /  SaO2: 98.7                            8.2    14.49 )-----------( 245      ( 2025 00:24 )             25.7     2025 00:24    137    |  98     |  27     ----------------------------<  84     4.3     |  24     |  3.71     Ca    8.5        2025 00:24  Phos  4.3       2025 00:24  Mg     2.1       2025 00:24    TPro  6.3    /  Alb  3.2    /  TBili  0.4    /  DBili  x      /  AST  37     /  ALT  21     /  AlkPhos  92     2025 00:24    PT/INR - ( 2025 00:24 )   PT: 16.2 sec;   INR: 1.41 ratio    PTT - ( 2025 00:24 )  PTT:30.6 sec      MEDICATIONS  (STANDING):  acetaminophen     Tablet .. 650 milliGRAM(s) Oral every 6 hours  gabapentin 100 milliGRAM(s) Oral every 8 hours  traZODone 100 milliGRAM(s) Oral at bedtime  lidocaine   4% Patch 2 Patch Transdermal daily  methocarbamol 500 milliGRAM(s) Oral every 8 hourslidocaine   4% Patch 2 Patch Transdermal daily  methocarbamol 500 milliGRAM(s) Oral every 8 hours    ascorbic acid 500 milliGRAM(s) Oral two times a day  aspirin enteric coated 81 milliGRAM(s) Oral daily  atorvastatin 80 milliGRAM(s) Oral daily  DOBUTamine Infusion 2.5 MICROgram(s)/kG/Min IV Continuous <Continuous>  vasopressin Infusion 0.03 Unit(s)/Min IV Continuous <Continuous>    calcium acetate 667 milliGRAM(s) Oral three times a day with meals  epoetin ignacia (EPOGEN) Injectable 98868 Unit(s) IV Push <User Schedule>  gabapentin 100 milliGRAM(s) Oral every 8 hours  heparin   Injectable 5000 Unit(s) SubCutaneous every 12 hours    insulin lispro (ADMELOG) corrective regimen sliding scale   SubCutaneous three times a day before meals  insulin lispro (ADMELOG) corrective regimen sliding scale   SubCutaneous at bedtime  Nephro-elicia 1 Tablet(s) Oral daily    pantoprazole    Tablet 40 milliGRAM(s) Oral before breakfast  polyethylene glycol 3350 17 Gram(s) Oral daily  senna 2 Tablet(s) Oral at bedtime      PHYSICAL EXAM:  Gen : no acute distress  Neck: No LAD, No JVD  Respiratory: decreased in the bases  Cardiovascular: S1 and S2, RRR, no M/G/R  Gastrointestinal: BS+, soft, NT/ND  Extremities: No peripheral edema  LUE AVF   Cool to touch   Vascular: 2+ peripheral pulses  Neurological: A/O x 3, no focal deficits  Incision: clean dry/ no sign of infection  Lines: no sign of infection      S/p CABG X3   Acute post operative respiratory insufficiency  Acute blood loss anemia/Thrombocytopenia  Electrolyte abnormalities   Post operative pain   ESRD on HD    Plan   POD # 2 s/p CABG  X 3  in the setting of NSTEMI, LHC showing ISR of LAD/D1/LCx stent . pt with hypotension and ECHO showing Systolic HF/depressed BIV Function, currently supported with /pressors.  Labs this evening showing transaminitis    Neuro - multimodal pain management  CVS s/p CABG,   Continue support with inotrope for concern of low flow state, Cardiogenic shock - labs suggestive of hepatic congestion   increased to 5  wean pressors as tolerated  pulm - stable on NC   - K- 5 Currently no acute indication for HD but will need RRT tmr  GI transaminitis - likely due to hepatic congestion in setting of low flow state     Critical care time spent  45  min    Brief history : 53yo with multiple medical problems including CAD s/p PCI in 2024    PMH: ESRD on HD, CAD s/p PCI , HTN, recurrent ascities requiring paracenthesis Q 4-6 wks, HLD s/p Acute darryl s/p cholecystectomy - admitted with acute SOB, Elevated trop (NSTEMI)  Echo showed newly depressed EF severe TR EF 35%   LHC ISR LAD/D1, LCx      CABG X 3  free LIMA-LAD; SVG-Diag; SVG-left PL  post ECHO EF 45 % on      weaned     24 hour events :  hypotension  Echo showed EF 25-30%   Dobutamine restarted   s/p 1 unit of PRBC    ICU Vital Signs Last 24 Hrs  T(C): 36.9 (25 @ 16:00), Max: 37.2 (25 @ 04:00)  T(F): 98.4 (25 @ 16:00), Max: 99 (25 @ 04:00)  HR: 66 (25 @ 18:00) (59 - 83)  BP: 92/51 (25 @ 13:45) (80/53 - 103/73)  BP(mean): 66 (25 @ 13:45) (61 - 84)  ABP: 101/44 (25 @ 18:00) (60/31 - 122/42)  ABP(mean): 60 (25 @ 18:00) (41 - 71)  RR: 5 (25 @ 18:00) (5 - 19)  SpO2: 97% (25 @ 18:00) (87% - 100%)    I&O's Summary    31 Dec 2024 07:01  -  2025 07:00  --------------------------------------------------------  IN: 1115.5 mL / OUT: 1100 mL / NET: 15.5 mL    2025 07:01  -  2025 18:18  --------------------------------------------------------  IN: 1043.2 mL / OUT: 70 mL / NET: 973.2 mL      ABG - ( 2025 17:25 )  pH: 7.30  /  pCO2: 51    /  pO2: 93    / HCO3: 25    / Base Excess: -1.5  /  SaO2: 98.7                            8.2    14.49 )-----------( 245      ( 2025 00:24 )             25.7     2025 00:24    137    |  98     |  27     ----------------------------<  84     4.3     |  24     |  3.71     Ca    8.5        2025 00:24  Phos  4.3       2025 00:24  Mg     2.1       2025 00:24    TPro  6.3    /  Alb  3.2    /  TBili  0.4    /  DBili  x      /  AST  37     /  ALT  21     /  AlkPhos  92     2025 00:24    PT/INR - ( 2025 00:24 )   PT: 16.2 sec;   INR: 1.41 ratio    PTT - ( 2025 00:24 )  PTT:30.6 sec      MEDICATIONS  (STANDING):  acetaminophen     Tablet .. 650 milliGRAM(s) Oral every 6 hours  gabapentin 100 milliGRAM(s) Oral every 8 hours  traZODone 100 milliGRAM(s) Oral at bedtime  lidocaine   4% Patch 2 Patch Transdermal daily  methocarbamol 500 milliGRAM(s) Oral every 8 hourslidocaine   4% Patch 2 Patch Transdermal daily  methocarbamol 500 milliGRAM(s) Oral every 8 hours    ascorbic acid 500 milliGRAM(s) Oral two times a day  aspirin enteric coated 81 milliGRAM(s) Oral daily  atorvastatin 80 milliGRAM(s) Oral daily  DOBUTamine Infusion 2.5 MICROgram(s)/kG/Min IV Continuous <Continuous>  vasopressin Infusion 0.03 Unit(s)/Min IV Continuous <Continuous>    calcium acetate 667 milliGRAM(s) Oral three times a day with meals  epoetin ignacia (EPOGEN) Injectable 71925 Unit(s) IV Push <User Schedule>  gabapentin 100 milliGRAM(s) Oral every 8 hours  heparin   Injectable 5000 Unit(s) SubCutaneous every 12 hours    insulin lispro (ADMELOG) corrective regimen sliding scale   SubCutaneous three times a day before meals  insulin lispro (ADMELOG) corrective regimen sliding scale   SubCutaneous at bedtime  Nephro-elicia 1 Tablet(s) Oral daily    pantoprazole    Tablet 40 milliGRAM(s) Oral before breakfast  polyethylene glycol 3350 17 Gram(s) Oral daily  senna 2 Tablet(s) Oral at bedtime      PHYSICAL EXAM:  Gen : no acute distress  Neck: No LAD, No JVD  Respiratory: decreased in the bases  Cardiovascular: S1 and S2, RRR, no M/G/R  Gastrointestinal: BS+, soft, NT/ND  Extremities: No peripheral edema  LUE AVF   Cool to touch   Vascular: 2+ peripheral pulses  Neurological: A/O x 3, no focal deficits  Incision: clean dry/ no sign of infection  Lines: no sign of infection      S/p CABG X3   Acute post operative respiratory insufficiency  Acute blood loss anemia/Thrombocytopenia  Electrolyte abnormalities   Post operative pain   ESRD on HD    Plan   POD # 2 s/p CABG  X 3  in the setting of NSTEMI, LHC showing ISR of LAD/D1/LCx stent . pt with hypotension and ECHO showing Systolic HF/depressed BIV Function, currently supported with /pressors.  Labs this evening showing transaminitis    Neuro - multimodal pain management  CVS s/p CABG,   Continue support with inotrope for concern of low flow state, Cardiogenic shock - labs suggestive of hepatic congestion   increased to 5  wean pressors as tolerated  pulm - stable on NC   - K- 5 Currently no acute indication for HD but will need RRT tmr  GI transaminitis - likely due to hepatic congestion in setting of low flow state     Critical care time spent  45  min

## 2025-01-01 NOTE — PROGRESS NOTE ADULT - ASSESSMENT
54M with PMH of HTN, HLD, ESRD on HD MWF via LUE AVF, ascites (requiring repeat paracenteses q4-6wks), NICM with moderate LV dysfunction w/ EF 35-40%() and CAD s/p atherectomy + SALLIE to D1(2024) presents to [facility] transferred from [facility]. Patient initially presented to [facility] ED with shortness of breath. Of note he was recently admitted from - for acute cholecystitis s/p cholecystectomy. In [facility] ED, pt was hypoxic to 86% on RA, trop 1.7K, EKG no new changes, CXR fluid overload. Admitted for AHRF 2/2 fluid overload. Pt received HD on , ,  and . DAPT was resumed, TTE showed improvement in LVEF to 40-45%. Stress test was abnormal with 1mm inferior STD, reversible ischemia in the inferior wall and fixed defects in the lateral, anterior and apical walls with post stress EF of 24%.     Pt was then transferred to [facility] for cardiac cath which showed pLAD 70% ISR, mLCx 70%, D1 severe dz.     Patient now transferred to [facility] CTS Dr. Rivers for CABG eval.    # CAD s/p NSTEMI  Hx CAD s/p PCI LAD   Presented with ADHF elevated troponins  Positive NST  -Cath- pLAD 70% ISR, mLCx 70%, D1 severe dz.   TTE EF 35% Severe TR  Was on Plavix-[id number] 321 been off Plavix since -POD#1-C3L free LIMA-LAD; SVG-Diag; [id number]-Uqgxoyymo on  Sinus rhythm,Amio for AF prophylaxis  -OOB off  Sinus rhythm         # Acute on Chronic Systolic Heart Failure  EF-35% ,severe TR  Had HD post op  GDMT post op  LVEF improved post bypass      # Ascites  Hx chronic ascites  Has drainage q4-6 weeks  Last drained -4600mL  ? ascites related to severe TR  Monitor        # ESRD  On HD  HD post op      # T2DM  A1c-5.6  RISS

## 2025-01-01 NOTE — PROGRESS NOTE ADULT - SUBJECTIVE AND OBJECTIVE BOX
[facility]    Dr. Nica Styles     Office [phone] (9 am to 5 pm)  Service: [phone] (5pm to 9am)  Also Available on TEAMS      RENAL PROGRESS NOTE: DATE OF SERVICE 01-01-25 @ 13:52    Patient is a 54y old  Male who presents with a chief complaint of CAD (01 Jan 2025 08:10)      Patient seen and examined at bedside. No chest pain/sob    VITALS:  T(F): 99 (01-01-25 @ 12:00), Max: 99 (01-01-25 @ 04:00)  HR: 63 (01-01-25 @ 13:00)  BP: 91/59 (01-01-25 @ 13:00)  RR: 8 (01-01-25 @ 13:00)  SpO2: 99% (01-01-25 @ 13:00)  Wt(kg): --    12-31 @ 07:01 - 01-01 @ 07:00  --------------------------------------------------------  IN: 1115.5 mL / OUT: 1100 mL / NET: 15.5 mL    01-01 @ 07:01 - 01-01 @ 13:52  --------------------------------------------------------  IN: 822 mL / OUT: 70 mL / NET: 752 mL          PHYSICAL EXAM:  Constitutional: NAD  Neck: No JVD  Respiratory: CTAB, no wheezes, rales or rhonchi  Cardiovascular: S1, S2, RRR  Gastrointestinal: BS+, soft, NT/ND  Extremities: No peripheral edema    Hospital Medications:   MEDICATIONS  (STANDING):  acetaminophen     Tablet .. 650 milliGRAM(s) Oral every 6 hours  aMIOdarone    Tablet 400 milliGRAM(s) Oral two times a day  ascorbic acid 500 milliGRAM(s) Oral two times a day  aspirin enteric coated 81 milliGRAM(s) Oral daily  atorvastatin 80 milliGRAM(s) Oral daily  bisacodyl Suppository 10 milliGRAM(s) Rectal once  calcium acetate 667 milliGRAM(s) Oral three times a day with meals  chlorhexidine 0.12% Liquid 15 milliLiter(s) Oral Mucosa every 12 hours  chlorhexidine 2% Cloths 1 Application(s) Topical daily  dextrose 50% Injectable 50 milliLiter(s) IV Push every 15 minutes  dextrose 50% Injectable 25 milliLiter(s) IV Push every 15 minutes  epoetin ignacia (EPOGEN) Injectable 89523 Unit(s) IV Push <User Schedule>  gabapentin 100 milliGRAM(s) Oral every 8 hours  heparin   Injectable 5000 Unit(s) SubCutaneous every 12 hours  insulin lispro (ADMELOG) corrective regimen sliding scale   SubCutaneous three times a day before meals  insulin lispro (ADMELOG) corrective regimen sliding scale   SubCutaneous at bedtime  insulin regular Infusion 3 Unit(s)/Hr (3 mL/Hr) IV Continuous <Continuous>  lidocaine   4% Patch 2 Patch Transdermal daily  methocarbamol 500 milliGRAM(s) Oral every 8 hours  Nephro-elicia 1 Tablet(s) Oral daily  pantoprazole    Tablet 40 milliGRAM(s) Oral before breakfast  polyethylene glycol 3350 17 Gram(s) Oral daily  senna 2 Tablet(s) Oral at bedtime  sodium chloride 0.9%. 1000 milliLiter(s) (10 mL/Hr) IV Continuous <Continuous>  traZODone 100 milliGRAM(s) Oral at bedtime  vasopressin Infusion 0.03 Unit(s)/Min (4.5 mL/Hr) IV Continuous <Continuous>      LABS:  01-01    137  |  98  |  27[H]  ----------------------------<  84  4.3   |  24  |  3.71[H]    Ca    8.5      01 Jan 2025 00:24  Phos  4.3     01-01  Mg     2.1     01-01    TPro  6.3  /  Alb  3.2[L]  /  TBili  0.4  /  DBili      /  AST  37  /  ALT  21  /  AlkPhos  92  01-01    Creatinine Trend: 3.71 <--, 3.36 <--, 5.24 <--, 6.71 <--, 8.86 <--, 7.20 <--, 9.21 <--, 7.88 <--    Albumin: 3.2 g/dL (01-01 @ 00:24)  Phosphorus: 4.3 mg/dL (01-01 @ 00:24)                              8.2    14.49 )-----------( 245      ( 01 Jan 2025 00:24 )             25.7     Urine Studies:  Urinalysis - [01-01-25 @ 00:24]      Color  / Appearance  / SG  / pH       Gluc 84 / Ketone   / Bili  / Urobili        Blood  / Protein  / Leuk Est  / Nitrite       RBC  / WBC  / Hyaline  / Gran  / Sq Epi  / Non Sq Epi  / Bacteria       Iron 29, TIBC 143, %sat 20      [12-19-24 @ 05:00]  Ferritin 904      [12-19-24 @ 05:00]  TSH 4.75      [12-24-24 @ 06:50]    HBsAg Nonreact      [12-19-24 @ 05:00]      RADIOLOGY & ADDITIONAL STUDIES:

## 2025-01-01 NOTE — PROGRESS NOTE ADULT - ASSESSMENT
54M with PMH of HTN, HLD, ESRD on HD MWF via LUE AVF, ascites (requiring repeat paracenteses q4-6wks), NICM with moderate LV dysfunction w/ EF 35-40%(2023) and CAD s/p atherectomy + SALLIE to D1(4/11/2024) presents to [facility] transferred from [facility] for CABG eval  Nephro on [facility] for HD    ESRD on HD   Regular schedule MWF   Access LUE AVF  Center [facility]  Nephrologist Dr. Bailey  Last HD on 12/24  S/p HD on 12/27 12/29, 12/30 for hyperkalemia and 12/31  HD planned for Friday next per cristin  Keep MAP>65  Renal Diet  Consent in physical chart    Hyperkalemia  Lokelma on non dialysis days  Monitor K     HTN  BP controlled   cw home meds  monitor bp     CKD MBD  Can send a PTH  Ca and Phos daily    Anemia  CRISTIANE with HD  Transfuse if hgb <7    CAD with multivessel disease  s/p CABG 12/30  CTICU following

## 2025-01-01 NOTE — PROGRESS NOTE ADULT - SUBJECTIVE AND OBJECTIVE BOX
Patient seen and examined at the bedside.    Remained critically ill on continuous ICU monitoring.    OBJECTIVE:  Vital Signs Last 24 Hrs  T(C): 37.1 (01 Jan 2025 08:00), Max: 37.2 (01 Jan 2025 04:00)  T(F): 98.8 (01 Jan 2025 08:00), Max: 99 (01 Jan 2025 04:00)  HR: 64 (01 Jan 2025 10:00) (64 - 84)  BP: 84/50 (01 Jan 2025 10:00) (80/53 - 99/57)  BP(mean): 61 (01 Jan 2025 10:00) (61 - 73)  RR: 9 (01 Jan 2025 10:00) (6 - 23)  SpO2: 99% (01 Jan 2025 10:00) (94% - 100%)    Parameters below as of 01 Jan 2025 08:00  Patient On (Oxygen Delivery Method): nasal cannula  O2 Flow (L/min): 3        Physical Exam:   General: NAD   Neurology: nonfocal   Eyes: bilateral pupils equal and reactive   ENT/Neck: Neck supple, trachea midline, No JVD   Respiratory: Clear bilaterally   CV: S1S2, no murmurs        [x] Sternal dressing,   Abdominal: Soft, NT, ND +BS   Extremities: 1-2+ pedal edema noted, + peripheral pulses   Skin: No Rashes, Hematoma, Ecchymosis                           Assessment:    54M with PMH of HTN, HLD, ESRD on HD MWF via LUE AVF, ascites (requiring repeat paracenteses q4-6wks), NICM with moderate LV dysfunction w/ EF 35-40%(2023) and CAD s/p atherectomy + SALLIE to D1(4/11/2024) presents to [facility] transferred from [facility] for CABG eval  Nephro on [facility] for HD      Plan:   ***Neuro***  [x] Hx of CVA   [x] Nonfocal    Post operative neuro assessment     ***Cardiovascular***  Invasive hemodynamic monitoring, assess perfusion indices   SR / CVP ___ / MAP ___ / PAP ___ / CI _98__ / Hct _25.7__ / Lactate 1.3___   [x] Amiodarone [x] Vasopressin    Volume:    Reassessment of hemodynamics post resuscitation   Monitor chest tube outputs    [x] VTE ppx with heparin  [x]  ASA  [x] Statin   Serial EKG and cardiac enzymes     ***Pulmonary***   [x] NC 3L            ***GI***  [x] Diet: regular  [x] Protonix     ***Renal***   [x] ESRD on HD   Continue to monitor I/Os, BUN/Creatinine.   Replete lytes PRN  Vigil present    ***ID***   No active antibiotic coverage      ***Endocrine***  [x] Stress Hyperglycemia [x]  DM2 [x] DM1 [x] Prediabetes : HbA1c _5.6___%                - [x] Insulin gtt  [x]  ISS              - Need tight glycemic control to prevent wound infection.      ALL MEDICATIONS (delete after use)  MEDICATIONS  (STANDING):  acetaminophen     Tablet .. 650 milliGRAM(s) Oral every 6 hours  acetaminophen   IVPB .. 1000 milliGRAM(s) IV Intermittent once  aMIOdarone    Tablet 400 milliGRAM(s) Oral two times a day  ascorbic acid 500 milliGRAM(s) Oral two times a day  aspirin enteric coated 81 milliGRAM(s) Oral daily  atorvastatin 80 milliGRAM(s) Oral daily  bisacodyl Suppository 10 milliGRAM(s) Rectal once  calcium acetate 667 milliGRAM(s) Oral three times a day with meals  chlorhexidine 0.12% Liquid 15 milliLiter(s) Oral Mucosa every 12 hours  chlorhexidine 2% Cloths 1 Application(s) Topical daily  dextrose 50% Injectable 50 milliLiter(s) IV Push every 15 minutes  dextrose 50% Injectable 25 milliLiter(s) IV Push every 15 minutes  epoetin ignacia (EPOGEN) Injectable 87860 Unit(s) IV Push once  epoetin ignacia (EPOGEN) Injectable 09695 Unit(s) IV Push <User Schedule>  gabapentin 100 milliGRAM(s) Oral every 8 hours  heparin   Injectable 5000 Unit(s) SubCutaneous every 12 hours  HYDROmorphone  Injectable 0.5 milliGRAM(s) IV Push once  insulin lispro (ADMELOG) corrective regimen sliding scale   SubCutaneous three times a day before meals  insulin lispro (ADMELOG) corrective regimen sliding scale   SubCutaneous at bedtime  insulin regular Infusion 3 Unit(s)/Hr (3 mL/Hr) IV Continuous <Continuous>  lidocaine   4% Patch 2 Patch Transdermal daily  methocarbamol 500 milliGRAM(s) Oral every 8 hours  metoclopramide Injectable 5 milliGRAM(s) IV Push every 8 hours  Nephro-elicia 1 Tablet(s) Oral daily  pantoprazole    Tablet 40 milliGRAM(s) Oral before breakfast  polyethylene glycol 3350 17 Gram(s) Oral daily  senna 2 Tablet(s) Oral at bedtime  sodium chloride 0.9%. 1000 milliLiter(s) (10 mL/Hr) IV Continuous <Continuous>  traZODone 100 milliGRAM(s) Oral at bedtime  vasopressin Infusion 0.03 Unit(s)/Min (4.5 mL/Hr) IV Continuous <Continuous>    MEDICATIONS  (PRN):  HYDROmorphone  Injectable 0.5 milliGRAM(s) IV Push every 6 hours PRN Breakthrough Pain  oxyCODONE    IR 5 milliGRAM(s) Oral every 4 hours PRN Moderate Pain (4 - 6)  oxyCODONE    IR 10 milliGRAM(s) Oral every 4 hours PRN Severe Pain (7 - 10)      Patient requires continuous monitoring with bedside rhythm monitoring, pulse oximetry monitoring, and continuous central venous and arterial pressure monitoring; and intermittent blood gas analysis. Care plan discussed with the ICU care team.   Patient remained critical, at risk for life threatening decompensation.    I have spent 30 minutes providing critical care management to this patient.    By signing my name below, I, Sonali Valle, attest that this documentation has been prepared under the direction and in the presence of ___  Electronically signed: Autumn Lainez, 01-01-25 @ 11:12    I, ___ , personally performed the services described in this documentation. all medical record entries made by the autumn were at my direction and in my presence. I have reviewed the chart and agree that the record reflects my personal performance and is accurate and complete  Electronically signed: ___ Patient seen and examined at the bedside.    Remained critically ill on continuous ICU monitoring.    Today:  - Ambulated  - Increased pain regimen with Robaxin and Lidocaine patches  - Hypotension without shock requiring vasopressin, Lactate and central venous saturation normal  - Received 100cc of 25% albumin for volume and 1U PRBC for acute blood loss anemia without much improvement in pressor requirements  - TTE ordered to reassess heart function given recent weaning of Dobutamine yesterday  - Patient received Lisinopril yesterday which was d/c'd and could also be impacting hemodynamics  - Epogen added  - Vigil removed  - Consider adding Plavix for recent stents once chest tube output decreases    OBJECTIVE:  Vital Signs Last 24 Hrs  T(C): 37.1 (01 Jan 2025 08:00), Max: 37.2 (01 Jan 2025 04:00)  T(F): 98.8 (01 Jan 2025 08:00), Max: 99 (01 Jan 2025 04:00)  HR: 64 (01 Jan 2025 10:00) (64 - 84)  BP: 84/50 (01 Jan 2025 10:00) (80/53 - 99/57)  BP(mean): 61 (01 Jan 2025 10:00) (61 - 73)  RR: 9 (01 Jan 2025 10:00) (6 - 23)  SpO2: 99% (01 Jan 2025 10:00) (94% - 100%)    Parameters below as of 01 Jan 2025 08:00  Patient On (Oxygen Delivery Method): nasal cannula  O2 Flow (L/min): 3        Physical Exam:   General: NAD   Neurology: nonfocal   Eyes: bilateral pupils equal and reactive   ENT/Neck: Neck supple, trachea midline, No JVD   Respiratory: Clear bilaterally   CV: S1S2, no murmurs        [x] Sternal dressing, Chest tubes, pacing wires  Abdominal: Soft, NT, ND +BS   Extremities: 1-2+ pedal edema noted, + peripheral pulses   Skin: No Rashes, Hematoma, Ecchymosis                      Assessment:    54M with PMH of HTN, HLD, ESRD on HD MWF via LUE AVF, ascites (requiring repeat paracenteses q4-6wks), NICM with moderate LV dysfunction w/ EF 35-40%(2023) and CAD s/p atherectomy + SALLIE to D1(4/11/2024) presents to [facility] transferred from [facility] for CABG eval  Nephro on [facility] for HD    Ischemic Cardiomyopathy  CAD s/p CABG x3  End Stage Renal Disease requiring hemodialysis  Hypotension without shock  Acute blood loss anemia      Plan:   ***Neuro***  [x] Hx of CVA   [x] Nonfocal    Post operative neuro assessment   pain meds    ***Cardiovascular***  Invasive hemodynamic monitoring, assess perfusion indices   SR / CVP ___ / MAP ___ / PAP ___ / CI _98__ / Hct _25.7__ / Lactate 1.3___   [x] Vasopressin    Volume:  100cc Albumin  Reassessment of hemodynamics post resuscitation   Monitor chest tube outputs   [x] VTE ppx with heparin  [x]  ASA  [x] Statin   Serial EKG and cardiac enzymes   TTE  s/p 1U PRBC    ***Pulmonary***   [x] NC 3L            ***GI***  [x] Diet: regular  [x] Protonix     ***Renal***   [x] ESRD on HD   Continue to monitor I/Os, BUN/Creatinine.   Replete lytes PRN  Vigil removed  HD tomorrow most likely    ***ID***   No active antibiotic coverage      ***Endocrine***  [x] Stress Hyperglycemia [x]  DM2 [x] DM1 [x] Prediabetes : HbA1c _5.6___%                - [x] Insulin gtt  [x]  ISS              - Need tight glycemic control to prevent wound infection.      ALL MEDICATIONS (delete after use)  MEDICATIONS  (STANDING):  acetaminophen     Tablet .. 650 milliGRAM(s) Oral every 6 hours  acetaminophen   IVPB .. 1000 milliGRAM(s) IV Intermittent once  aMIOdarone    Tablet 400 milliGRAM(s) Oral two times a day  ascorbic acid 500 milliGRAM(s) Oral two times a day  aspirin enteric coated 81 milliGRAM(s) Oral daily  atorvastatin 80 milliGRAM(s) Oral daily  bisacodyl Suppository 10 milliGRAM(s) Rectal once  calcium acetate 667 milliGRAM(s) Oral three times a day with meals  chlorhexidine 0.12% Liquid 15 milliLiter(s) Oral Mucosa every 12 hours  chlorhexidine 2% Cloths 1 Application(s) Topical daily  dextrose 50% Injectable 50 milliLiter(s) IV Push every 15 minutes  dextrose 50% Injectable 25 milliLiter(s) IV Push every 15 minutes  epoetin ignacia (EPOGEN) Injectable 56616 Unit(s) IV Push once  epoetin ignacia (EPOGEN) Injectable 19026 Unit(s) IV Push <User Schedule>  gabapentin 100 milliGRAM(s) Oral every 8 hours  heparin   Injectable 5000 Unit(s) SubCutaneous every 12 hours  HYDROmorphone  Injectable 0.5 milliGRAM(s) IV Push once  insulin lispro (ADMELOG) corrective regimen sliding scale   SubCutaneous three times a day before meals  insulin lispro (ADMELOG) corrective regimen sliding scale   SubCutaneous at bedtime  insulin regular Infusion 3 Unit(s)/Hr (3 mL/Hr) IV Continuous <Continuous>  lidocaine   4% Patch 2 Patch Transdermal daily  methocarbamol 500 milliGRAM(s) Oral every 8 hours  metoclopramide Injectable 5 milliGRAM(s) IV Push every 8 hours  Nephro-elicia 1 Tablet(s) Oral daily  pantoprazole    Tablet 40 milliGRAM(s) Oral before breakfast  polyethylene glycol 3350 17 Gram(s) Oral daily  senna 2 Tablet(s) Oral at bedtime  sodium chloride 0.9%. 1000 milliLiter(s) (10 mL/Hr) IV Continuous <Continuous>  traZODone 100 milliGRAM(s) Oral at bedtime  vasopressin Infusion 0.03 Unit(s)/Min (4.5 mL/Hr) IV Continuous <Continuous>    MEDICATIONS  (PRN):  HYDROmorphone  Injectable 0.5 milliGRAM(s) IV Push every 6 hours PRN Breakthrough Pain  oxyCODONE    IR 5 milliGRAM(s) Oral every 4 hours PRN Moderate Pain (4 - 6)  oxyCODONE    IR 10 milliGRAM(s) Oral every 4 hours PRN Severe Pain (7 - 10)      Patient requires continuous monitoring with bedside rhythm monitoring, pulse oximetry monitoring, and continuous central venous and arterial pressure monitoring; and intermittent blood gas analysis. Care plan discussed with the ICU care team.   Patient remained critical, at risk for life threatening decompensation.    I have spent 45minutes providing critical care management to this patient.    By signing my name below, I, Sonali Valle, attest that this documentation has been prepared under the direction and in the presence of Olayinka Crocker  Electronically signed: Evelio Lainez, 01-01-25 @ 11:12    I, Olayinka Crocker, personally performed the services described in this documentation. all medical record entries made by the scribe were at my direction and in my presence. I have reviewed the chart and agree that the record reflects my personal performance and is accurate and complete  Electronically signed: Olayinka Crocker PA-C

## 2025-01-01 NOTE — PROGRESS NOTE ADULT - SUBJECTIVE AND OBJECTIVE BOX
MR#[id number]  PATIENT NAME:RAAD CANO    DATE OF SERVICE: 01-01-25 @ 08:10  Patient was seen and examined by Solo Quick MD on    01-01-25 @ 08:10 .  Interim events noted.Consultant notes ,Labs,Telemetry reviewed by me       HOSPITAL COURSE: HPI:  54M with PMH of HTN, HLD, ESRD on HD MWF via LUE AVF, ascites (requiring repeat paracenteses q4-6wks), NICM with moderate LV dysfunction w/ EF 35-40%(2023) and CAD s/p atherectomy + SALLIE to D1(4/11/2024) presents to [facility] transferred from [facility]. Patient initially presented to [facility] with shortness of breath. Of note he was recently admitted from 09/27-12/02 for acute cholecystitis s/p cholecystectomy. In [facility] ED, pt was hypoxic to 86% on RA, trop 1.7K, EKG no new changes, CXR fluid overload. Admitted for AHRF 2/2 fluid overload. Pt received HD on 12/18, 12/19, 12/20 and 12/22. DAPT was resumed, TTE showed improvement in LVEF to 40-45%. Stress test was abnormal with 1mm inferior STD, reversible ischemia in the inferior wall and fixed defects in the lateral, anterior and apical walls with post stress EF of 24%. Pt was then transferred to [facility] for cardiac cath which showed pLAD 70% ISR, mLCx 70%, D1 severe dz. Patient now transferred to [facility] CTS Dr. Rivers for CABG eval. Patient denies any current SOB or chest pain on admission. Otherwise denies headaches, abdominal pain, urinary or bowel changes, fevers, chills, N/V/D, sick contacts.  (24 Dec 2024 05:07)    INTERIM EVENTS:Patient seen at bedside ,interim events noted.  12/23 Cardiac cath  pLAD 70% ISR, mLCx 70%, D1 severe dz.   12/ 25 VSS  CP free   HD via avf  for daily HD pre op- on lovenox 30 qd   12/26 HD today continue with present meds. Plan for CABG possibleTVR next week  12/27- VSS Sinus rhythm   12/28-Sinus rhythm plan for OR on Monday HD tomorrow  12/29-OOB Sinus rhythm no chest pain or dyspnea for HD with PRBC Tx today-CABG/TVR in AM -Dr Karan Rivers  12/30-Awake had episode bilious vomit after bedside PFT testing no abdominal pain For OR today Had HD with PRBC Tx  12/31-POD#1-C3L free LIMA-LAD; SVG-Diag; SVG-leftPL Awake OOB extubated Sinus rhythm on Dobutamine gtt  01/01-POD#2 OOB c/o pain on Nicardipine gtt Sinus Rhythm    PMH -reviewed admission note, no change since admission  HEART FAILURE: Acute[x ]Chronic[ ] Systolic[x ] Diastolic[ ] Combined Systolic and Diastolic[ ]  CAD[ x] CABG[ ] PCI[x ]  DEVICES[ ] PPM[ ] ICD[ ] ILR[ ]  ATRIAL FIBRILLATION[ ] Paroxysmal[ ] Permanent[ ] CHADS2-[  ]  GHASSAN[ ] CKD1[ ] CKD2[ ] CKD3[ ] CKD4[ ] ESRD[x ]  COPD[ ] HTN[ ]   DM[ ] Type1[ ] Type 2[ ]   CVA[ ] Paresis[ ]    AMBULATION: Assisted[ ] Cane/walker[ ] Independent[x ]      MEDICATIONS  (STANDING):  acetaminophen     Tablet .. 650 milliGRAM(s) Oral every 6 hours  aMIOdarone    Tablet 400 milliGRAM(s) Oral two times a day  ascorbic acid 500 milliGRAM(s) Oral two times a day  aspirin enteric coated 81 milliGRAM(s) Oral daily  atorvastatin 80 milliGRAM(s) Oral daily  bisacodyl Suppository 10 milliGRAM(s) Rectal once  calcium acetate 667 milliGRAM(s) Oral three times a day with meals  chlorhexidine 0.12% Liquid 15 milliLiter(s) Oral Mucosa every 12 hours  chlorhexidine 2% Cloths 1 Application(s) Topical daily  dextrose 50% Injectable 50 milliLiter(s) IV Push every 15 minutes  dextrose 50% Injectable 25 milliLiter(s) IV Push every 15 minutes  gabapentin 100 milliGRAM(s) Oral every 8 hours  heparin   Injectable 5000 Unit(s) SubCutaneous every 12 hours  insulin lispro (ADMELOG) corrective regimen sliding scale   SubCutaneous three times a day before meals  insulin lispro (ADMELOG) corrective regimen sliding scale   SubCutaneous at bedtime  insulin regular Infusion 3 Unit(s)/Hr (3 mL/Hr) IV Continuous <Continuous>  lisinopril 20 milliGRAM(s) Oral daily  metoclopramide Injectable 5 milliGRAM(s) IV Push every 8 hours  Nephro-elicia 1 Tablet(s) Oral daily  niCARdipine Infusion 5 mG/Hr (25 mL/Hr) IV Continuous <Continuous>  pantoprazole    Tablet 40 milliGRAM(s) Oral before breakfast  polyethylene glycol 3350 17 Gram(s) Oral daily  senna 2 Tablet(s) Oral at bedtime  sodium chloride 0.9%. 1000 milliLiter(s) (10 mL/Hr) IV Continuous <Continuous>  traZODone 100 milliGRAM(s) Oral at bedtime    MEDICATIONS  (PRN):  HYDROmorphone  Injectable 0.5 milliGRAM(s) IV Push every 6 hours PRN Breakthrough Pain  oxyCODONE    IR 5 milliGRAM(s) Oral every 4 hours PRN Moderate Pain (4 - 6)  oxyCODONE    IR 10 milliGRAM(s) Oral every 4 hours PRN Severe Pain (7 - 10)            REVIEW OF SYSTEMS:  Constitutional: [ ] fever, [ ]weight loss,  [ ]fatigue [ ]weight gain  Eyes: [ ] visual changes  Respiratory: [ ]shortness of breath;  [ ] cough, [ ]wheezing, [ ]chills, [ ]hemoptysis  Cardiovascular: [ ] chest pain, [ ]palpitations, [ ]dizziness,  [ ]leg swelling[ ]orthopnea[ ]PND  Gastrointestinal: [ ] abdominal pain, [ ]nausea, [ ]vomiting,  [ ]diarrhea [ ]Constipation [ ]Melena  Genitourinary: [ ] dysuria, [ ] hematuria [ ]Vigil  Neurologic: [ ] headaches [ ] tremors[ ]weakness [ ]Paralysis Right[ ] Left[ ]  Skin: [ ] itching, [ ]burning, [ ] rashes  Endocrine: [ ] heat or cold intolerance  Musculoskeletal: [ ] joint pain or swelling; [ ] muscle, back, or extremity pain  Psychiatric: [ ] depression, [ ]anxiety, [ ]mood swings, or [ ]difficulty sleeping  Hematologic: [ ] easy bruising, [ ] bleeding gums    [ ] All remaining systems negative except as per above.   [ ]Unable to obtain.  [x] No change in ROS since admission      Vital Signs Last 24 Hrs  T(C): 37.2 (01 Jan 2025 04:00), Max: 37.2 (01 Jan 2025 04:00)  T(F): 99 (01 Jan 2025 04:00), Max: 99 (01 Jan 2025 04:00)  HR: 69 (01 Jan 2025 07:00) (69 - 87)  BP: 81/49 (01 Jan 2025 07:00) (80/53 - 98/55)  BP(mean): 61 (01 Jan 2025 07:00) (61 - 72)  RR: 12 (01 Jan 2025 07:00) (7 - 27)  SpO2: 100% (01 Jan 2025 07:00) (94% - 100%)    Parameters below as of 01 Jan 2025 04:00  Patient On (Oxygen Delivery Method): nasal cannula  O2 Flow (L/min): 3    I&O's Summary    31 Dec 2024 07:01  -  01 Jan 2025 07:00  --------------------------------------------------------  IN: 1115.5 mL / OUT: 1100 mL / NET: 15.5 mL        PHYSICAL EXAM:  General: No acute distress BMI-28  HEENT: EOMI, PERRL  Neck: Supple, [ ] JVD  Lungs: Equal air entry bilaterally; [ ] rales [ ] wheezing [ ] rhonchi  Heart: Regular rate and rhythm; [x ] murmur   2/6 [ x] systolic [ ] diastolic [ ] radiation[ ] rubs [ ]  gallops  Abdomen: Nontender, bowel sounds present  Extremities: No clubbing, cyanosis, [ ] edema [ ]Pulses  equal and intact  Nervous system:  Alert & Oriented X3, no focal deficits  Psychiatric: Normal affect  Skin: No rashes or lesions    LABS:  01-01    137  |  98  |  27[H]  ----------------------------<  84  4.3   |  24  |  3.71[H]    Ca    8.5      01 Jan 2025 00:24  Phos  4.3     01-01  Mg     2.1     01-01    TPro  6.3  /  Alb  3.2[L]  /  TBili  0.4  /  DBili  x   /  AST  37  /  ALT  21  /  AlkPhos  92  01-01    Creatinine Trend: 3.71<--, 3.36<--, 5.24<--, 6.71<--, 8.86<--, 7.20<--                        8.2    14.49 )-----------( 245      ( 01 Jan 2025 00:24 )             25.7     PT/INR - ( 01 Jan 2025 00:24 )   PT: 16.2 sec;   INR: 1.41 ratio         PTT - ( 01 Jan 2025 00:24 )  PTT:30.6 sec         Xray Chest 1 View- PORTABLE-Routine (12.31.24 @ 02:17) >  IMPRESSION:  No focal consolidation.       Intra-Operative Transesophageal Echo W or WO Ultrasound Enhancing Agent (12.30.24 @ 11:31) >  Post-Bypass:  S/p CABG x3  LV systolic function improved on Dobutmine 5 mcg/kg/min. No new wall motion abnormalities. EF ~45%. Norml RV function.  Mild TR.  mild PI.  Trace MR.  Trace AI.  No aortic dissection visualized.PREOP-TTE W or WO Ultrasound Enhancing Agent (12.24.24 @ 14:25) >  CONCLUSIONS:      1. Left ventricular cavity is severely dilated. Left ventricular wall thickness is normal. Left ventricular systolic function is moderately to severely decreased with an ejection fraction of 35 % by Michaels's method of disks. Global left ventricular hypokinesis. EF 35%   2. Elevated left ventricular filling pressure.   3. Severe tricuspid regurgitation.   4. Moderate-to-severe pulmonary hypertension.       Cardiac Catheterization (12.23.24 @ 17:55) >  Diagnostic Conclusions:   Severe in-stent restenosis in LAD/D1 bifurcation. Severe in-stent stenosis in mid Cx. Elevated LVEDP 25 mmHg.

## 2025-01-02 LAB
ALBUMIN SERPL ELPH-MCNC: 3.3 G/DL — SIGNIFICANT CHANGE UP (ref 3.3–5)
ALP SERPL-CCNC: 90 U/L — SIGNIFICANT CHANGE UP (ref 40–120)
ALT FLD-CCNC: 632 U/L — HIGH (ref 10–45)
ANION GAP SERPL CALC-SCNC: 16 MMOL/L — SIGNIFICANT CHANGE UP (ref 5–17)
APTT BLD: 32.3 SEC — SIGNIFICANT CHANGE UP (ref 24.5–35.6)
AST SERPL-CCNC: 901 U/L — HIGH (ref 10–40)
BASE EXCESS BLDV CALC-SCNC: -2.8 MMOL/L — LOW (ref -2–3)
BASE EXCESS BLDV CALC-SCNC: -3.1 MMOL/L — LOW (ref -2–3)
BASOPHILS # BLD AUTO: 0.06 K/UL — SIGNIFICANT CHANGE UP (ref 0–0.2)
BASOPHILS NFR BLD AUTO: 0.4 % — SIGNIFICANT CHANGE UP (ref 0–2)
BILIRUB SERPL-MCNC: 0.6 MG/DL — SIGNIFICANT CHANGE UP (ref 0.2–1.2)
BUN SERPL-MCNC: 43 MG/DL — HIGH (ref 7–23)
CALCIUM SERPL-MCNC: 8.8 MG/DL — SIGNIFICANT CHANGE UP (ref 8.4–10.5)
CHLORIDE SERPL-SCNC: 98 MMOL/L — SIGNIFICANT CHANGE UP (ref 96–108)
CO2 BLDV-SCNC: 26 MMOL/L — SIGNIFICANT CHANGE UP (ref 22–26)
CO2 BLDV-SCNC: 26 MMOL/L — SIGNIFICANT CHANGE UP (ref 22–26)
EGFR: 13 ML/MIN/1.73M2 — LOW
EGFR: 13 ML/MIN/1.73M2 — LOW
EOSINOPHIL # BLD AUTO: 0.72 K/UL — HIGH (ref 0–0.5)
EOSINOPHIL NFR BLD AUTO: 5 % — SIGNIFICANT CHANGE UP (ref 0–6)
FIBRINOGEN PPP-MCNC: 318 MG/DL — SIGNIFICANT CHANGE UP (ref 200–445)
GAS PNL BLDA: SIGNIFICANT CHANGE UP
GAS PNL BLDV: SIGNIFICANT CHANGE UP
GLUCOSE SERPL-MCNC: 166 MG/DL — HIGH (ref 70–99)
HCO3 BLDV-SCNC: 24 MMOL/L — SIGNIFICANT CHANGE UP (ref 22–29)
HCO3 BLDV-SCNC: 24 MMOL/L — SIGNIFICANT CHANGE UP (ref 22–29)
HGB BLD-MCNC: 8.6 G/DL — LOW (ref 13–17)
HOROWITZ INDEX BLDV+IHG-RTO: 60 — SIGNIFICANT CHANGE UP
IMM GRANULOCYTES NFR BLD AUTO: 0.8 % — SIGNIFICANT CHANGE UP (ref 0–0.9)
INR BLD: 1.7 RATIO — HIGH (ref 0.85–1.16)
LYMPHOCYTES # BLD AUTO: 4.2 % — LOW (ref 13–44)
MAGNESIUM SERPL-MCNC: 2 MG/DL — SIGNIFICANT CHANGE UP (ref 1.6–2.6)
MCHC RBC-ENTMCNC: 30.8 PG — SIGNIFICANT CHANGE UP (ref 27–34)
MCHC RBC-ENTMCNC: 32.3 G/DL — SIGNIFICANT CHANGE UP (ref 32–36)
MONOCYTES # BLD AUTO: 0.9 K/UL — SIGNIFICANT CHANGE UP (ref 0–0.9)
MONOCYTES NFR BLD AUTO: 6.3 % — SIGNIFICANT CHANGE UP (ref 2–14)
NEUTROPHILS # BLD AUTO: 11.94 K/UL — HIGH (ref 1.8–7.4)
NEUTROPHILS NFR BLD AUTO: 83.3 % — HIGH (ref 43–77)
NRBC # BLD: 0 /100 WBCS — SIGNIFICANT CHANGE UP (ref 0–0)
NRBC BLD-RTO: 0 /100 WBCS — SIGNIFICANT CHANGE UP (ref 0–0)
PCO2 BLDV: 50 MMHG — SIGNIFICANT CHANGE UP (ref 42–55)
PCO2 BLDV: 51 MMHG — SIGNIFICANT CHANGE UP (ref 42–55)
PH BLDV: 7.28 — LOW (ref 7.32–7.43)
PH BLDV: 7.29 — LOW (ref 7.32–7.43)
PHOSPHATE SERPL-MCNC: 7 MG/DL — HIGH (ref 2.5–4.5)
PLATELET # BLD AUTO: 156 K/UL — SIGNIFICANT CHANGE UP (ref 150–400)
PO2 BLDV: 35 MMHG — SIGNIFICANT CHANGE UP (ref 25–45)
PO2 BLDV: 45 MMHG — SIGNIFICANT CHANGE UP (ref 25–45)
POTASSIUM SERPL-MCNC: 5 MMOL/L — SIGNIFICANT CHANGE UP (ref 3.5–5.3)
POTASSIUM SERPL-SCNC: 5 MMOL/L — SIGNIFICANT CHANGE UP (ref 3.5–5.3)
PROT SERPL-MCNC: 6 G/DL — SIGNIFICANT CHANGE UP (ref 6–8.3)
PROTHROM AB SERPL-ACNC: 19.5 SEC — HIGH (ref 9.9–13.4)
RBC # BLD: 2.79 M/UL — LOW (ref 4.2–5.8)
RBC # FLD: 18.9 % — HIGH (ref 10.3–14.5)
SAO2 % BLDV: 60 % — LOW (ref 67–88)
SAO2 % BLDV: 78.7 % — SIGNIFICANT CHANGE UP (ref 67–88)
SODIUM SERPL-SCNC: 135 MMOL/L — SIGNIFICANT CHANGE UP (ref 135–145)
WBC # BLD: 14.34 K/UL — HIGH (ref 3.8–10.5)
WBC # FLD AUTO: 14.34 K/UL — HIGH (ref 3.8–10.5)

## 2025-01-02 PROCEDURE — 71045 X-RAY EXAM CHEST 1 VIEW: CPT | Mod: 26,77

## 2025-01-02 PROCEDURE — 71045 X-RAY EXAM CHEST 1 VIEW: CPT | Mod: 26

## 2025-01-02 PROCEDURE — 31500 INSERT EMERGENCY AIRWAY: CPT

## 2025-01-02 PROCEDURE — 99291 CRITICAL CARE FIRST HOUR: CPT | Mod: 25

## 2025-01-02 PROCEDURE — 99292 CRITICAL CARE ADDL 30 MIN: CPT | Mod: 25

## 2025-01-02 PROCEDURE — 31624 DX BRONCHOSCOPE/LAVAGE: CPT

## 2025-01-02 RX ORDER — ASPIRIN 325 MG
300 TABLET ORAL ONCE
Refills: 0 | Status: COMPLETED | OUTPATIENT
Start: 2025-01-02 | End: 2025-01-02

## 2025-01-02 RX ORDER — PIPERACILLIN-TAZO-DEXTROSE,ISO 3.375G/5
3.38 IV SOLUTION, PIGGYBACK PREMIX FROZEN(ML) INTRAVENOUS ONCE
Refills: 0 | Status: COMPLETED | OUTPATIENT
Start: 2025-01-02 | End: 2025-01-02

## 2025-01-02 RX ORDER — HYDROMORPHONE/SOD CHLOR,ISO/PF 2 MG/10 ML
0.5 SYRINGE (ML) INJECTION ONCE
Refills: 0 | Status: DISCONTINUED | OUTPATIENT
Start: 2025-01-02 | End: 2025-01-02

## 2025-01-02 RX ORDER — FENTANYL CITRATE-0.9 % NACL/PF 100MCG/2ML
100 SYRINGE (ML) INTRAVENOUS ONCE
Refills: 0 | Status: DISCONTINUED | OUTPATIENT
Start: 2025-01-02 | End: 2025-01-02

## 2025-01-02 RX ORDER — ETOMIDATE 2 MG/ML
20 SYRINGE (ML) INTRAVENOUS ONCE
Refills: 0 | Status: COMPLETED | OUTPATIENT
Start: 2025-01-02 | End: 2025-01-02

## 2025-01-02 RX ORDER — VANCOMYCIN HCL IN 5 % DEXTROSE 1.5G/250ML
PLASTIC BAG, INJECTION (ML) INTRAVENOUS
Refills: 0 | Status: DISCONTINUED | OUTPATIENT
Start: 2025-01-02 | End: 2025-01-02

## 2025-01-02 RX ORDER — ROCURONIUM BROMIDE 10 MG/ML
100 INJECTION, SOLUTION INTRAVENOUS ONCE
Refills: 0 | Status: COMPLETED | OUTPATIENT
Start: 2025-01-02 | End: 2025-01-02

## 2025-01-02 RX ORDER — PIPERACILLIN-TAZO-DEXTROSE,ISO 3.375G/5
3.38 IV SOLUTION, PIGGYBACK PREMIX FROZEN(ML) INTRAVENOUS EVERY 12 HOURS
Refills: 0 | Status: DISCONTINUED | OUTPATIENT
Start: 2025-01-03 | End: 2025-01-03

## 2025-01-02 RX ORDER — ACETAMINOPHEN 500 MG/5ML
1000 LIQUID (ML) ORAL ONCE
Refills: 0 | Status: COMPLETED | OUTPATIENT
Start: 2025-01-02 | End: 2025-01-02

## 2025-01-02 RX ORDER — CALCIUM GLUCONATE 20 MG/ML
2 INJECTION, SOLUTION INTRAVENOUS ONCE
Refills: 0 | Status: COMPLETED | OUTPATIENT
Start: 2025-01-02 | End: 2025-01-02

## 2025-01-02 RX ORDER — DEXTROSE 50 % IN WATER 50 %
50 SYRINGE (ML) INTRAVENOUS
Refills: 0 | Status: COMPLETED | OUTPATIENT
Start: 2025-01-02 | End: 2025-01-02

## 2025-01-02 RX ORDER — FENTANYL CITRATE-0.9 % NACL/PF 100MCG/2ML
50 SYRINGE (ML) INTRAVENOUS ONCE
Refills: 0 | Status: DISCONTINUED | OUTPATIENT
Start: 2025-01-02 | End: 2025-01-02

## 2025-01-02 RX ORDER — LIDOCAINE HCL/PF 10 MG/ML
20 VIAL (ML) INJECTION
Refills: 0 | Status: COMPLETED | OUTPATIENT
Start: 2025-01-02 | End: 2025-01-02

## 2025-01-02 RX ORDER — MIDAZOLAM IN 0.9 % SOD.CHLORID 1 MG/ML
1 PLASTIC BAG, INJECTION (ML) INTRAVENOUS ONCE
Refills: 0 | Status: DISCONTINUED | OUTPATIENT
Start: 2025-01-02 | End: 2025-01-02

## 2025-01-02 RX ORDER — DEXMEDETOMIDINE HYDROCHLORIDE IN SODIUM CHLORIDE 4 UG/ML
0.5 INJECTION INTRAVENOUS
Qty: 200 | Refills: 0 | Status: DISCONTINUED | OUTPATIENT
Start: 2025-01-02 | End: 2025-01-03

## 2025-01-02 RX ORDER — DEXTROSE 50 % IN WATER 50 %
50 SYRINGE (ML) INTRAVENOUS ONCE
Refills: 0 | Status: COMPLETED | OUTPATIENT
Start: 2025-01-02 | End: 2025-01-02

## 2025-01-02 RX ORDER — PROPOFOL 10 MG/ML
20 INJECTION, EMULSION INTRAVENOUS
Qty: 1000 | Refills: 0 | Status: DISCONTINUED | OUTPATIENT
Start: 2025-01-02 | End: 2025-01-02

## 2025-01-02 RX ORDER — CLOPIDOGREL BISULFATE 75 MG/1
75 TABLET, FILM COATED ORAL DAILY
Refills: 0 | Status: DISCONTINUED | OUTPATIENT
Start: 2025-01-02 | End: 2025-01-07

## 2025-01-02 RX ORDER — PIPERACILLIN-TAZO-DEXTROSE,ISO 3.375G/5
3.38 IV SOLUTION, PIGGYBACK PREMIX FROZEN(ML) INTRAVENOUS ONCE
Refills: 0 | Status: COMPLETED | OUTPATIENT
Start: 2025-01-03 | End: 2025-01-03

## 2025-01-02 RX ORDER — ALBUMIN (HUMAN) 12.5 G/50ML
100 INJECTION, SOLUTION INTRAVENOUS ONCE
Refills: 0 | Status: COMPLETED | OUTPATIENT
Start: 2025-01-02 | End: 2025-01-02

## 2025-01-02 RX ORDER — VANCOMYCIN HCL IN 5 % DEXTROSE 1.5G/250ML
1000 PLASTIC BAG, INJECTION (ML) INTRAVENOUS ONCE
Refills: 0 | Status: COMPLETED | OUTPATIENT
Start: 2025-01-02 | End: 2025-01-02

## 2025-01-02 RX ORDER — IPRATROPIUM BROMIDE AND ALBUTEROL SULFATE .5; 2.5 MG/3ML; MG/3ML
3 SOLUTION RESPIRATORY (INHALATION) EVERY 6 HOURS
Refills: 0 | Status: DISCONTINUED | OUTPATIENT
Start: 2025-01-02 | End: 2025-01-17

## 2025-01-02 RX ADMIN — LIDOCAINE HYDROCHLORIDE 2 PATCH: 20 JELLY TOPICAL at 12:57

## 2025-01-02 RX ADMIN — LIDOCAINE HYDROCHLORIDE 2 PATCH: 20 JELLY TOPICAL at 19:00

## 2025-01-02 RX ADMIN — Medication 650 MILLIGRAM(S): at 13:30

## 2025-01-02 RX ADMIN — OXYCODONE HYDROCHLORIDE 10 MILLIGRAM(S): 30 TABLET ORAL at 13:30

## 2025-01-02 RX ADMIN — Medication 100 MICROGRAM(S): at 23:20

## 2025-01-02 RX ADMIN — OXYCODONE HYDROCHLORIDE 10 MILLIGRAM(S): 30 TABLET ORAL at 05:59

## 2025-01-02 RX ADMIN — Medication 15 MILLILITER(S): at 05:59

## 2025-01-02 RX ADMIN — OXYCODONE HYDROCHLORIDE 10 MILLIGRAM(S): 30 TABLET ORAL at 12:57

## 2025-01-02 RX ADMIN — GABAPENTIN 100 MILLIGRAM(S): 400 CAPSULE ORAL at 05:59

## 2025-01-02 RX ADMIN — Medication 500 MILLIGRAM(S): at 06:00

## 2025-01-02 RX ADMIN — Medication 250 MILLIGRAM(S): at 21:42

## 2025-01-02 RX ADMIN — Medication 400 MILLIGRAM(S): at 20:06

## 2025-01-02 RX ADMIN — Medication 50 MICROGRAM(S): at 17:35

## 2025-01-02 RX ADMIN — Medication 50 MILLIEQUIVALENT(S): at 23:45

## 2025-01-02 RX ADMIN — Medication 650 MILLIGRAM(S): at 12:57

## 2025-01-02 RX ADMIN — Medication 1 APPLICATION(S): at 21:27

## 2025-01-02 RX ADMIN — Medication 20 MILLIGRAM(S): at 23:10

## 2025-01-02 RX ADMIN — Medication 667 MILLIGRAM(S): at 08:16

## 2025-01-02 RX ADMIN — CALCIUM GLUCONATE 200 GRAM(S): 20 INJECTION, SOLUTION INTRAVENOUS at 21:06

## 2025-01-02 RX ADMIN — ROCURONIUM BROMIDE 100 MILLIGRAM(S): 10 INJECTION, SOLUTION INTRAVENOUS at 23:10

## 2025-01-02 RX ADMIN — Medication 0.1 MILLIGRAM(S): at 23:40

## 2025-01-02 RX ADMIN — Medication 650 MILLIGRAM(S): at 06:00

## 2025-01-02 RX ADMIN — DOBUTAMINE 12.8 MICROGRAM(S)/KG/MIN: 250 INJECTION INTRAVENOUS at 19:50

## 2025-01-02 RX ADMIN — HEPARIN SODIUM 5000 UNIT(S): 1000 INJECTION INTRAVENOUS; SUBCUTANEOUS at 18:11

## 2025-01-02 RX ADMIN — DOBUTAMINE 12.8 MICROGRAM(S)/KG/MIN: 250 INJECTION INTRAVENOUS at 08:15

## 2025-01-02 RX ADMIN — Medication 50 MICROGRAM(S): at 17:20

## 2025-01-02 RX ADMIN — Medication 20 MILLIGRAM(S): at 17:15

## 2025-01-02 RX ADMIN — Medication 4 MILLILITER(S): at 17:48

## 2025-01-02 RX ADMIN — CLOPIDOGREL BISULFATE 75 MILLIGRAM(S): 75 TABLET, FILM COATED ORAL at 10:35

## 2025-01-02 RX ADMIN — METHOCARBAMOL 500 MILLIGRAM(S): 500 TABLET, FILM COATED ORAL at 06:00

## 2025-01-02 RX ADMIN — Medication 200 GRAM(S): at 20:50

## 2025-01-02 RX ADMIN — DOBUTAMINE 12.8 MICROGRAM(S)/KG/MIN: 250 INJECTION INTRAVENOUS at 12:14

## 2025-01-02 RX ADMIN — Medication 40 MILLIGRAM(S): at 06:01

## 2025-01-02 RX ADMIN — CALCIUM CHLORIDE 1000 MILLIGRAM(S): 100 INJECTION, SOLUTION INTRAVENOUS at 23:30

## 2025-01-02 RX ADMIN — OXYCODONE HYDROCHLORIDE 10 MILLIGRAM(S): 30 TABLET ORAL at 07:00

## 2025-01-02 RX ADMIN — VASOPRESSIN 4.5 UNIT(S)/MIN: 20 INJECTION INTRAVENOUS at 19:49

## 2025-01-02 RX ADMIN — Medication 100 MICROGRAM(S): at 23:05

## 2025-01-02 RX ADMIN — Medication 1000 MILLIGRAM(S): at 20:21

## 2025-01-02 RX ADMIN — HEPARIN SODIUM 5000 UNIT(S): 1000 INJECTION INTRAVENOUS; SUBCUTANEOUS at 06:00

## 2025-01-02 RX ADMIN — Medication 0.5 MILLIGRAM(S): at 08:16

## 2025-01-02 RX ADMIN — Medication 650 MILLIGRAM(S): at 07:00

## 2025-01-02 RX ADMIN — Medication 0.5 MILLIGRAM(S): at 08:32

## 2025-01-02 RX ADMIN — Medication 300 MILLIGRAM(S): at 18:11

## 2025-01-02 RX ADMIN — Medication 650 MILLIGRAM(S): at 00:44

## 2025-01-02 RX ADMIN — IPRATROPIUM BROMIDE AND ALBUTEROL SULFATE 3 MILLILITER(S): .5; 2.5 SOLUTION RESPIRATORY (INHALATION) at 23:10

## 2025-01-02 RX ADMIN — INSULIN LISPRO 5: 100 INJECTION, SOLUTION INTRAVENOUS; SUBCUTANEOUS at 12:00

## 2025-01-02 RX ADMIN — NOREPINEPHRINE BITARTRATE 7.98 MICROGRAM(S)/KG/MIN: 8 SOLUTION at 19:49

## 2025-01-02 RX ADMIN — Medication 50 MILLILITER(S): at 18:10

## 2025-01-02 RX ADMIN — METHOCARBAMOL 500 MILLIGRAM(S): 500 TABLET, FILM COATED ORAL at 13:51

## 2025-01-02 RX ADMIN — ALBUMIN (HUMAN) 50 MILLILITER(S): 12.5 INJECTION, SOLUTION INTRAVENOUS at 20:52

## 2025-01-02 NOTE — DIETITIAN INITIAL EVALUATION ADULT - REASON INDICATOR FOR ASSESSMENT
RD assessment warranted for: ICU length of stay. Chart reviewed, events noted.  Source: medical record, patient.

## 2025-01-02 NOTE — PROVIDER CONTACT NOTE (HYPOGLYCEMIA EVENT) - NS PROVIDER CONTACT BACKGROUND-HYPO
Age: 54y    Gender: Male    POCT Blood Glucose:98 (01-02-25 @ 16:00)  148 (01-02-25 @ 06:00)  170 (01-01-25 @ 22:00)    159 mg/dL (01-02-25 @ 18:35)  63 mg/dL (01-02-25 @ 18:08)  98 mg/dL (01-02-25 @ 16:27)  152 mg/dL (01-02-25 @ 11:57)  148 mg/dL (01-02-25 @ 06:08)  170 mg/dL (01-01-25 @ 22:07)      eMAR:  dextrose 50% Injectable   50 milliLiter(s) IV Push (01-02-25 @ 18:10)    insulin lispro (ADMELOG) corrective regimen sliding scale   5 Unit(s) SubCutaneous (01-02-25 @ 12:00)

## 2025-01-02 NOTE — DIETITIAN INITIAL EVALUATION ADULT - NS FNS DIET ORDER
Diet, Regular:   Consistent Carbohydrate {No Snacks} (CSTCHO)  For patients receiving Renal Replacement - No Protein Restr, No Conc K, No Conc Phos, Low Sodium (RENAL) (12-31-24 @ 11:30) [Active]

## 2025-01-02 NOTE — PROGRESS NOTE ADULT - SUBJECTIVE AND OBJECTIVE BOX
[facility]    Dr. Nica Styles     Office [phone] (9 am to 5 pm)  Service: [phone] (5pm to 9am)  Also Available on TEAMS      RENAL PROGRESS NOTE: DATE OF SERVICE 01-02-25 @ 10:44    Patient is a 54y old  Male who presents with a chief complaint of Atherosclerosis of native coronary artery without angina pectoris     (02 Jan 2025 09:03)      Patient seen and examined at bedside. No chest pain/sob    VITALS:  T(F): 97.4 (01-02-25 @ 08:00), Max: 99 (01-01-25 @ 12:00)  HR: 67 (01-02-25 @ 10:30)  BP: 92/51 (01-01-25 @ 13:45)  RR: 12 (01-02-25 @ 10:30)  SpO2: 95% (01-02-25 @ 10:30)  Wt(kg): --    01-01 @ 07:01 - 01-02 @ 07:00  --------------------------------------------------------  IN: 1653.9 mL / OUT: 280 mL / NET: 1373.9 mL    01-02 @ 07:01 - 01-02 @ 10:44  --------------------------------------------------------  IN: 172.4 mL / OUT: 70 mL / NET: 102.4 mL          PHYSICAL EXAM:  Constitutional: NAD  Neck: No JVD  Respiratory: CTAB, no wheezes, rales or rhonchi  Cardiovascular: S1, S2, RRR  Gastrointestinal: BS+, soft, NT/ND  Extremities: No peripheral edema    Hospital Medications:   MEDICATIONS  (STANDING):  acetaminophen     Tablet .. 650 milliGRAM(s) Oral every 6 hours  ascorbic acid 500 milliGRAM(s) Oral two times a day  aspirin enteric coated 81 milliGRAM(s) Oral daily  atorvastatin 80 milliGRAM(s) Oral daily  bisacodyl Suppository 10 milliGRAM(s) Rectal once  calcium acetate 667 milliGRAM(s) Oral three times a day with meals  chlorhexidine 2% Cloths 1 Application(s) Topical daily  clopidogrel Tablet 75 milliGRAM(s) Oral daily  dextrose 50% Injectable 50 milliLiter(s) IV Push every 15 minutes  dextrose 50% Injectable 25 milliLiter(s) IV Push every 15 minutes  DOBUTamine Infusion 5 MICROgram(s)/kG/Min (12.8 mL/Hr) IV Continuous <Continuous>  epoetin ignacia (EPOGEN) Injectable 57080 Unit(s) IV Push <User Schedule>  gabapentin 100 milliGRAM(s) Oral every 8 hours  heparin   Injectable 5000 Unit(s) SubCutaneous every 12 hours  insulin lispro (ADMELOG) corrective regimen sliding scale   SubCutaneous three times a day before meals  insulin lispro (ADMELOG) corrective regimen sliding scale   SubCutaneous at bedtime  lidocaine   4% Patch 2 Patch Transdermal daily  methocarbamol 500 milliGRAM(s) Oral every 8 hours  Nephro-elicia 1 Tablet(s) Oral daily  norepinephrine Infusion 0.05 MICROgram(s)/kG/Min (7.98 mL/Hr) IV Continuous <Continuous>  pantoprazole    Tablet 40 milliGRAM(s) Oral before breakfast  polyethylene glycol 3350 17 Gram(s) Oral two times a day  senna 2 Tablet(s) Oral at bedtime  sodium chloride 0.9% for Nebulization 3 milliLiter(s) Nebulizer every 6 hours  sodium chloride 0.9%. 1000 milliLiter(s) (10 mL/Hr) IV Continuous <Continuous>  traZODone 100 milliGRAM(s) Oral at bedtime  vasopressin Infusion 0.03 Unit(s)/Min (4.5 mL/Hr) IV Continuous <Continuous>      LABS:  01-02    135  |  98  |  43[H]  ----------------------------<  166[H]  5.0   |  21[L]  |  5.03[H]    Ca    8.8      02 Jan 2025 00:32  Phos  7.0     01-02  Mg     2.0     01-02    TPro  6.0  /  Alb  3.3  /  TBili  0.6  /  DBili      /  AST  901[H]  /  ALT  632[H]  /  AlkPhos  90  01-02    Creatinine Trend: 5.03 <--, 4.67 <--, 3.71 <--, 3.36 <--, 5.24 <--, 6.71 <--, 8.86 <--, 7.20 <--, 9.21 <--    Albumin: 3.3 g/dL (01-02 @ 00:32)  Phosphorus: 7.0 mg/dL (01-02 @ 00:32)  Albumin: 3.4 g/dL (01-01 @ 18:35)                              8.6    14.34 )-----------( 156      ( 02 Jan 2025 00:33 )             26.6     Urine Studies:  Urinalysis - [01-02-25 @ 00:32]      Color  / Appearance  / SG  / pH       Gluc 166 / Ketone   / Bili  / Urobili        Blood  / Protein  / Leuk Est  / Nitrite       RBC  / WBC  / Hyaline  / Gran  / Sq Epi  / Non Sq Epi  / Bacteria       Iron 29, TIBC 143, %sat 20      [12-19-24 @ 05:00]  Ferritin 904      [12-19-24 @ 05:00]  TSH 4.75      [12-24-24 @ 06:50]    HBsAg Nonreact      [12-19-24 @ 05:00]      RADIOLOGY & ADDITIONAL STUDIES:

## 2025-01-02 NOTE — DIETITIAN INITIAL EVALUATION ADULT - ADD RECOMMEND
-Continue Consistent Carbohydrate Renal diet.  -Add Nepro x 1/day (provides 425 kcal, 19 g protein) to assist with protein-energy intake for midsternal surgical incision healing and in setting of HD.  -Continue nephro-elicia and vitamin C pending no medical contraindications.  -Monitor for malnutrition given significant weight loss PTA.

## 2025-01-02 NOTE — DIETITIAN INITIAL EVALUATION ADULT - NSFNSGIIOFT_GEN_A_CORE
Patient reports nausea at times.   01-01-25 @ 07:01  -  01-02-25 @ 07:00  --------------------------------------------------------  OUT:    Chest Tube (mL): 0 mL    Chest Tube (mL): 200 mL    Chest Tube (mL): 80 mL  Total OUT: 280 mL    Total NET: -280 mL      01-02-25 @ 07:01  -  01-02-25 @ 09:03  --------------------------------------------------------  OUT:    Chest Tube (mL): 40 mL    Chest Tube (mL): 0 mL    Chest Tube (mL): 10 mL  Total OUT: 50 mL    Total NET: -50 mL

## 2025-01-02 NOTE — DIETITIAN INITIAL EVALUATION ADULT - PERSON TAUGHT/METHOD
RD reviewed nutrition therapy for s/p CABG with midsternal incision. Emphasized importance of adequate lean protein intake and optimal blood glucose levels for wound healing. Advised pt to limit concentrated sweets, portion control, and importance of fiber intake. Also discussed importance of limiting sodium intake as well as heart healthy food options. Pt made aware RD to remain available. Patient lethargic and in pain at time of visit, could benefit from education reinforcement at later date./verbal instruction/patient instructed

## 2025-01-02 NOTE — PROGRESS NOTE ADULT - SUBJECTIVE AND OBJECTIVE BOX
Patient seen and examined at the bedside.    Remains critically ill on continuous ICU monitoring.    Brief Summary:  54yoM with multiple medical problems including CAD s/p PCI in April 2024  PMH: ESRD on HD, CAD s/p PCI 4/20024, HTN, recurrent ascities requiring paracenthesis Q 4-6 wks, HLD s/p Acute darryl s/p cholecystectomy 9/27-12/7 12/30 CABG X 3  free LIMA-LAD; SVG-Diag; SVG-left PL    24 Hour events:  Hypotension  Echo showed EF 25-30%   Dobutamine restarted  Remains on Levophed  Off Vasopressin    Objective:  Vital Signs Last 24 Hrs  T(C): 36.6 (02 Jan 2025 04:00), Max: 37.2 (01 Jan 2025 12:00)  T(F): 97.8 (02 Jan 2025 04:00), Max: 99 (01 Jan 2025 12:00)  HR: 67 (02 Jan 2025 06:45) (59 - 75)  BP: 92/51 (01 Jan 2025 13:45) (84/50 - 103/73)  BP(mean): 66 (01 Jan 2025 13:45) (61 - 84)  RR: 9 (02 Jan 2025 06:45) (5 - 23)  SpO2: 94% (02 Jan 2025 06:45) (75% - 100%)    Parameters below as of 02 Jan 2025 00:00  Patient On (Oxygen Delivery Method): nasal cannula  O2 Flow (L/min): 3    Physical Exam:  General: Alert and interactive  Neurology: Oriented, following commands  Respiratory: Bilateral breath sounds  CV: Sinus  Abdominal: Soft, Nontender  Extremities: Warm, well-perfused  -------------------------------------------------------------------------------------------------------------------------------    Labs:                        8.6    14.34 )-----------( 156      ( 02 Jan 2025 00:33 )             26.6     01-02    135  |  98  |  43[H]  ----------------------------<  166[H]  5.0   |  21[L]  |  5.03[H]    Ca    8.8      02 Jan 2025 00:32  Phos  7.0     01-02  Mg     2.0     01-02    TPro  6.0  /  Alb  3.3  /  TBili  0.6  /  DBili  x   /  AST  901[H]  /  ALT  632[H]  /  AlkPhos  90  01-02    LIVER FUNCTIONS - ( 02 Jan 2025 00:32 )  Alb: 3.3 g/dL / Pro: 6.0 g/dL / ALK PHOS: 90 U/L / ALT: 632 U/L / AST: 901 U/L / GGT: x           PT/INR - ( 02 Jan 2025 00:33 )   PT: 19.5 sec;   INR: 1.70 ratio       PTT - ( 02 Jan 2025 00:33 )  PTT:32.3 sec  ABG - ( 02 Jan 2025 02:00 )  pH, Arterial: 7.31  pH, Blood: x     /  pCO2: 51    /  pO2: 91    / HCO3: 26    / Base Excess: -0.8  /  SaO2: 98.9    ------------------------------------------------------------------------------------------------------------------------------  Assessment:  54yoM with multiple medical problems including CAD s/p PCI in April 2024  PMH: ESRD on HD, CAD s/p PCI 4/20024, HTN, recurrent ascities requiring paracenthesis Q 4-6 wks, HLD s/p Acute darryl s/p cholecystectomy 9/27-12/7 12/30 CABG X 3  free LIMA-LAD; SVG-Diag; SVG-left PL  Acute post operative respiratory insufficiency  Acute blood loss anemia/Thrombocytopenia  Electrolyte abnormalities   Post operative pain   ESRD on HD    Plan:    ***Neuro***  Postoperative acute pain control with Tylenol, Gabapentin, Robaxin, and prns.  Trazadone nightly.    ***Cardiovascular***  At high risk for hemodynamic instability and cardiac arrhythmias.  s/p CABG x3  Wean Levophed for MAP > 65 mm Hg. Off Vasopressin.  Remains on Dobutamine.  ASA / Statin  Monitor chest tube outputs.    ***Pulmonary***  Postoperative acute pulmonary insufficiency - NC 3L  Encourage incentive spirometry, continue pulse ox monitoring, follow ABGs   Deep breathing and coughing exercises.    ***GI***  Regular diet  Protonix for stress ulcer prophylaxis.   Bowel regimen.    ***Renal***  ESRD on HD  Trend Creatinine.  Replete electrolytes as indicated.    ***ID***  No antibiotic coverage.  Trend leukocytosis.    ***Endocrine***  Hyperglycemia - Insulin sliding scale    ***Hematology***  Acute blood loss anemia and thrombocytopenia.  No current transfusion indication.  SQ Heparin for DVT prophylaxis.        Care plan discussed with the ICU care team.   Patient remains critical, at risk for life threatening decompensation.    I have spent 30 minutes providing critical care management to this patient.    By signing my name below, I, Baldemar Hernandez, attest that this documentation has been prepared under the direction and in the presence of Jen Sierra MD.  Electronically signed: Baldemar Hernandez, 01-02-25 @ 08:22    I, Jen Sierra,  personally performed the services described in this documentation. All medical record entries made by the scribe were at my direction and in my presence. I have reviewed the chart and agree that the record reflects my personal performance and is accurate and complete  Electronically signed: Jen Sierra MD. Patient seen and examined at the bedside.    Remains critically ill on continuous ICU monitoring.    Brief Summary:  55 yo M with multiple medical problems including CAD s/p PCI in April 2024 s/p CABG x 3 on 12/30/24      24 Hour events:  Hypotensive yesterday requiring increased pressor support.  Dobutamine resumed with good effect.    Objective:  Vital Signs Last 24 Hrs  T(C): 36.6 (02 Jan 2025 04:00), Max: 37.2 (01 Jan 2025 12:00)  T(F): 97.8 (02 Jan 2025 04:00), Max: 99 (01 Jan 2025 12:00)  HR: 67 (02 Jan 2025 06:45) (59 - 75)  BP: 92/51 (01 Jan 2025 13:45) (84/50 - 103/73)  BP(mean): 66 (01 Jan 2025 13:45) (61 - 84)  RR: 9 (02 Jan 2025 06:45) (5 - 23)  SpO2: 94% (02 Jan 2025 06:45) (75% - 100%)    Parameters below as of 02 Jan 2025 00:00  Patient On (Oxygen Delivery Method): nasal cannula  O2 Flow (L/min): 3    Physical Exam:  General: Alert and interactive, sitting in chair  Neurology: Oriented, following commands  Respiratory: Bilateral breath sounds  CV: Sinus  Abdominal: Soft, Nontender  Extremities: Warm, well-perfused  -------------------------------------------------------------------------------------------------------------------------------    Labs:                        8.6    14.34 )-----------( 156      ( 02 Jan 2025 00:33 )             26.6     01-02    135  |  98  |  43[H]  ----------------------------<  166[H]  5.0   |  21[L]  |  5.03[H]    Ca    8.8      02 Jan 2025 00:32  Phos  7.0     01-02  Mg     2.0     01-02    TPro  6.0  /  Alb  3.3  /  TBili  0.6  /  DBili  x   /  AST  901[H]  /  ALT  632[H]  /  AlkPhos  90  01-02    LIVER FUNCTIONS - ( 02 Jan 2025 00:32 )  Alb: 3.3 g/dL / Pro: 6.0 g/dL / ALK PHOS: 90 U/L / ALT: 632 U/L / AST: 901 U/L / GGT: x           PT/INR - ( 02 Jan 2025 00:33 )   PT: 19.5 sec;   INR: 1.70 ratio       PTT - ( 02 Jan 2025 00:33 )  PTT:32.3 sec  ABG - ( 02 Jan 2025 02:00 )  pH, Arterial: 7.31  pH, Blood: x     /  pCO2: 51    /  pO2: 91    / HCO3: 26    / Base Excess: -0.8  /  SaO2: 98.9    ------------------------------------------------------------------------------------------------------------------------------  Assessment:  55 yo M s/p CABG x 3 on 12/30/24    Acute post operative pulmonary insufficiency  Hypotension without shock  Acute blood loss anemia  Transaminitis  Hyperkalemia  Hyperphosphatemia  ESRD on HD    Plan:    ***Neuro***  Postoperative acute pain control with Tylenol, Gabapentin, Robaxin, and prns.  Trazadone nightly.  PT ongoing.    ***Cardiovascular***  At high risk for ongoing hemodynamic instability and cardiac arrhythmias.  s/p CABG x 3  Wean pressors for SBP > 100 mm Hg  Remains on Dobutamine.  ASA / Statin and will resume Plavix.  Monitor chest tube outputs, may be able to remove.  Vascular surgery consulted for fistula evaluation - may be contributing to heart failure.    ***Pulmonary***  Postoperative acute pulmonary insufficiency   Saline nebs for thick secretions.  Encourage incentive spirometry, deep breathing and coughing exercises.    ***GI***  Regular diet  Protonix for stress ulcer prophylaxis.   Bowel regimen.  Transaminitis - should improve on Dobutamine, will trend, avoid hepatotoxins.    ***Renal***  ESRD on HD  Hyperkalemia - HD today and Lokelma given once yesterday.  Hyperphosphatemia - Phoslo    ***ID***  No antibiotic coverage.  Trend leukocytosis.    ***Endocrine***  Hyperglycemia - Insulin sliding scale    ***Hematology***  Acute blood loss anemia and thrombocytopenia.  No current transfusion indication.  SQ Heparin for DVT prophylaxis.        Care plan discussed with the ICU care team.   Patient remains critical, at risk for life threatening decompensation.    I have spent 45 minutes providing critical care management to this patient.    By signing my name below, I, Baldemar Hernandez, attest that this documentation has been prepared under the direction and in the presence of Jen Sierra MD.  Electronically signed: Baldemar Hernandez, 01-02-25 @ 08:22    I, Jen Sierra,  personally performed the services described in this documentation. All medical record entries made by the scribe were at my direction and in my presence. I have reviewed the chart and agree that the record reflects my personal performance and is accurate and complete  Electronically signed: Jen Sierra MD.

## 2025-01-02 NOTE — PROGRESS NOTE ADULT - SUBJECTIVE AND OBJECTIVE BOX
MR#[id number]  PATIENT NAME:RAAD CANO    DATE OF SERVICE: 25 @ 06:52  Patient was seen and examined by Solo Quick MD on    25 @ 06:52 .  Interim events noted.Consultant notes ,Labs,Telemetry reviewed by me       HOSPITAL COURSE: HPI:  54M with PMH of HTN, HLD, ESRD on HD MWF via LUE AVF, ascites (requiring repeat paracenteses q4-6wks), NICM with moderate LV dysfunction w/ EF 35-40%() and CAD s/p atherectomy + SALLIE to D1(2024) presents to [facility] transferred from [facility]. Patient initially presented to [facility] ED with shortness of breath. Of note he was recently admitted from - for acute cholecystitis s/p cholecystectomy. In [facility] ED, pt was hypoxic to 86% on RA, trop 1.7K, EKG no new changes, CXR fluid overload. Admitted for AHRF 2/2 fluid overload. Pt received HD on , ,  and . DAPT was resumed, TTE showed improvement in LVEF to 40-45%. Stress test was abnormal with 1mm inferior STD, reversible ischemia in the inferior wall and fixed defects in the lateral, anterior and apical walls with post stress EF of 24%. Pt was then transferred to [facility] for cardiac cath which showed pLAD 70% ISR, mLCx 70%, D1 severe dz. Patient now transferred to [facility] CTS Dr. Rivers for CABG eval. Patient denies any current SOB or chest pain on admission. Otherwise denies headaches, abdominal pain, urinary or bowel changes, fevers, chills, N/V/D, sick contacts.  (24 Dec 2024 05:07)      INTERIM EVENTS:Patient seen at bedside ,interim events noted.   Cardiac cath  pLAD 70% ISR, mLCx 70%, D1 severe dz.    VSS  CP free   HD via avf  for daily HD pre op- on lovenox 30 qd    HD today continue with present meds. Plan for CABG possibleTVR next week  - VSS Sinus rhythm   -Sinus rhythm plan for OR on Monday HD tomorrow  -OOB Sinus rhythm no chest pain or dyspnea for HD with PRBC Tx today-CABG/TVR in AM -Dr Karan Rivers  -Awake had episode bilious vomit after bedside PFT testing no abdominal pain For OR today Had HD with PRBC Tx  -POD#1-C3L free LIMA-LAD; SVG-Diag; SVG-leftPL Awake OOB extubated Sinus rhythm on Dobutamine gtt  -POD#2 OOB c/o pain on Nicardipine gtt Sinus Rhythm  -OOB -restarted on  TTE EF 25-30% Remains in Sinus Rhythm      PMH -reviewed admission note, no change since admission  HEART FAILURE: Acute[x ]Chronic[ ] Systolic[x ] Diastolic[ ] Combined Systolic and Diastolic[ ]  CAD[ x] CABG[ ] PCI[x ]  DEVICES[ ] PPM[ ] ICD[ ] ILR[ ]  ATRIAL FIBRILLATION[ ] Paroxysmal[ ] Permanent[ ] CHADS2-[  ]  GHASSAN[ ] CKD1[ ] CKD2[ ] CKD3[ ] CKD4[ ] ESRD[x ]  COPD[ ] HTN[ ]   DM[ ] Type1[ ] Type 2[ ]   CVA[ ] Paresis[ ]    AMBULATION: Assisted[ ] Cane/walker[ ] Independent[x ]    MEDICATIONS  (STANDING):  acetaminophen     Tablet .. 650 milliGRAM(s) Oral every 6 hours  ascorbic acid 500 milliGRAM(s) Oral two times a day  aspirin enteric coated 81 milliGRAM(s) Oral daily  atorvastatin 80 milliGRAM(s) Oral daily  bisacodyl Suppository 10 milliGRAM(s) Rectal once  calcium acetate 667 milliGRAM(s) Oral three times a day with meals  chlorhexidine 0.12% Liquid 15 milliLiter(s) Oral Mucosa every 12 hours  chlorhexidine 2% Cloths 1 Application(s) Topical daily  dextrose 50% Injectable 50 milliLiter(s) IV Push every 15 minutes  dextrose 50% Injectable 25 milliLiter(s) IV Push every 15 minutes  DOBUTamine Infusion 5 MICROgram(s)/kG/Min (12.8 mL/Hr) IV Continuous <Continuous>  epoetin ignacia (EPOGEN) Injectable 37190 Unit(s) IV Push <User Schedule>  gabapentin 100 milliGRAM(s) Oral every 8 hours  heparin   Injectable 5000 Unit(s) SubCutaneous every 12 hours  insulin lispro (ADMELOG) corrective regimen sliding scale   SubCutaneous three times a day before meals  insulin lispro (ADMELOG) corrective regimen sliding scale   SubCutaneous at bedtime  lidocaine   4% Patch 2 Patch Transdermal daily  methocarbamol 500 milliGRAM(s) Oral every 8 hours  Nephro-elicia 1 Tablet(s) Oral daily  norepinephrine Infusion 0.05 MICROgram(s)/kG/Min (7.98 mL/Hr) IV Continuous <Continuous>  pantoprazole    Tablet 40 milliGRAM(s) Oral before breakfast  polyethylene glycol 3350 17 Gram(s) Oral daily  senna 2 Tablet(s) Oral at bedtime  sodium chloride 0.9%. 1000 milliLiter(s) (10 mL/Hr) IV Continuous <Continuous>  traZODone 100 milliGRAM(s) Oral at bedtime  vasopressin Infusion 0.03 Unit(s)/Min (4.5 mL/Hr) IV Continuous <Continuous>    MEDICATIONS  (PRN):  acetaminophen     Tablet .. 650 milliGRAM(s) Oral every 6 hours PRN Mild Pain (1 - 3)  oxyCODONE    IR 10 milliGRAM(s) Oral every 4 hours PRN Severe Pain (7 - 10)  oxyCODONE    IR 5 milliGRAM(s) Oral every 4 hours PRN Moderate Pain (4 - 6)            REVIEW OF SYSTEMS:  Constitutional: [ ] fever, [ ]weight loss,  [x ]fatigue [ ]weight gain  Eyes: [ ] visual changes  Respiratory: [ ]shortness of breath;  [ ] cough, [ ]wheezing, [ ]chills, [ ]hemoptysis  Cardiovascular: [x ] chest pain, [ ]palpitations, [ ]dizziness,  [x ]leg swelling[ ]orthopnea[ ]PND  Gastrointestinal: [ ] abdominal pain, [ ]nausea, [ ]vomiting,  [ ]diarrhea [ ]Constipation [ ]Melena  Genitourinary: [ ] dysuria, [ ] hematuria [ ]Vigil  Neurologic: [ ] headaches [ ] tremors[ ]weakness [ ]Paralysis Right[ ] Left[ ]  Skin: [ ] itching, [ ]burning, [ ] rashes  Endocrine: [ ] heat or cold intolerance  Musculoskeletal: [ ] joint pain or swelling; [ ] muscle, back, or extremity pain  Psychiatric: [ ] depression, [ ]anxiety, [ ]mood swings, or [ ]difficulty sleeping  Hematologic: [ ] easy bruising, [ ] bleeding gums    [ ] All remaining systems negative except as per above.   [ ]Unable to obtain.  [x] No change in ROS since admission      Vital Signs Last 24 Hrs  T(C): 36.6 (2025 04:00), Max: 37.2 (2025 12:00)  T(F): 97.8 (2025 04:00), Max: 99 (2025 12:00)  HR: 67 (2025 06:45) (59 - 75)  BP: 92/51 (2025 13:45) (81/49 - 103/73)  BP(mean): 66 (2025 13:45) (61 - 84)  RR: 9 (2025 06:45) (5 - 23)  SpO2: 94% (2025 06:45) (75% - 100%)    Parameters below as of 2025 00:00  Patient On (Oxygen Delivery Method): nasal cannula  O2 Flow (L/min): 3    I&O's Summary    31 Dec 2024 07:01  -  2025 07:00  --------------------------------------------------------  IN: 1115.5 mL / OUT: 1100 mL / NET: 15.5 mL    2025 07:01  -  2025 06:52  --------------------------------------------------------  IN: 1653.9 mL / OUT: 280 mL / NET: 1373.9 mL        PHYSICAL EXAM:  General: No acute distress BMI-27  HEENT: EOMI, PERRL  Neck: Supple, [ ] JVD  Lungs: Equal air entry bilaterally; [ ] rales [ ] wheezing [ ] rhonchi  Heart: Regular rate and rhythm; [x ] murmur   2/6 [ x] systolic [ ] diastolic [ ] radiation[ ] rubs [ ]  gallops  Abdomen: Nontender, bowel sounds present  Extremities: No clubbing, cyanosis, [x ] edema [ ]Pulses  equal and intact  Nervous system:  Alert & Oriented X3, no focal deficits  Psychiatric: Normal affect  Skin: No rashes or lesions    LABS:      135  |  98  |  43[H]  ----------------------------<  166[H]  5.0   |  21[L]  |  5.03[H]    Ca    8.8      2025 00:32  Phos  7.0       Mg     2.0         TPro  6.0  /  Alb  3.3  /  TBili  0.6  /  DBili  x   /  AST  901[H]  /  ALT  632[H]  /  AlkPhos  90      Creatinine Trend: 5.03<--, 4.67<--, 3.71<--, 3.36<--, 5.24<--, 6.71<--                        8.6    14.34 )-----------( 156      ( 2025 00:33 )             26.6     PT/INR - ( 2025 00:33 )   PT: 19.5 sec;   INR: 1.70 ratio         PTT - ( 2025 00:33 )  PTT:32.3 sec     Echo W or WO Ultrasound Enhancing Agent (25 @ 12:32) >  CONCLUSIONS:      1. Left ventricular cavity is normal in size. Left ventricular systolic function is severely decreased with an ejection fractionvisually estimated at 25 to 30 %.   2. Enlarged right ventricular cavity size and reduced right ventricular systolic function.   3. Mild to moderate tricuspid regurgitation.       Intra-Operative Transesophageal Echo W or WO Ultrasound Enhancing Agent (24 @ 11:31) >  Post-Bypass:  S/p CABG x3  LV systolic function improved on Dobutmine 5 mcg/kg/min. No new wall motion abnormalities. EF ~45%. Norml RV function.  Mild TR.  mild PI.  Trace MR.  Trace AI.  No aortic dissection visualized.      PREOP-TTE W or WO Ultrasound Enhancing Agent (24 @ 14:25) >  CONCLUSIONS:      1. Left ventricular cavity is severely dilated. Left ventricular wall thickness is normal. Left ventricular systolic function is moderately to severely decreased with an ejection fraction of 35 % by Michaels's method of disks. Global left ventricular hypokinesis. EF 35%   2. Elevated left ventricular filling pressure.   3. Severe tricuspid regurgitation.   4. Moderate-to-severe pulmonary hypertension.       Cardiac Catheterization (24 @ 17:55) >  Diagnostic Conclusions:   Severe in-stent restenosis in LAD/D1 bifurcation. Severe in-stent stenosis in mid Cx. Elevated LVEDP 25 mmHg.

## 2025-01-02 NOTE — PROCEDURE NOTE - NSBRONCHPROCDETAILS_GEN_A_CORE_FT
Bronchoscopy inserted orally.  Lidocaine injected to vocal cords via scope.  Trachea entered, grossly normal.  Large purulent/milky mucus plug in left main stem and left lower lobe.  Irrigated and suctioned. BAL sent.  Right bronchus and airways with minimal secretions.    Patient tolerated procedure well with improved oxygenation immediately post-procedure.  CXR pending.  BIPAP initiated to re-recruit left lung.    Wife updated post-procedure.

## 2025-01-02 NOTE — DIETITIAN INITIAL EVALUATION ADULT - ORAL INTAKE PTA/DIET HISTORY
Baseline good appetite and intake PTA. Follows low sugar, low-salt diet at home. [facility] outpatient HD center has RD available. Confirms no known food allergies. Drinking protein shakes 1x/day at home (each has 30g per patient). Takes darryl-elicia PTA.

## 2025-01-02 NOTE — DIETITIAN INITIAL EVALUATION ADULT - PROBLEM SELECTOR PLAN 1
Admit to CTS Tita Mcneill  Cath Report noted above f/u official report   c/w ASA, Statin, coreg 12.5mg Q12   Plavix on hold in preop setting, f/u p2y12   c/w heparin gtt from [facility]  lisinopril on hold in preop setting  Preop w/u in progress- Carotids, PFTs, TFTs  Tentative OR Date: TBD  All Labs and imaging to be reviewed by Dr. Rivers

## 2025-01-02 NOTE — DIETITIAN INITIAL EVALUATION ADULT - DIET TYPE
Nepro x 1/day (provides 425 kcal, 19 g protein)/consistent carbohydrate (no snacks)/renal replacement pts:no protein restr,no conc K & phos, low sodium/supplement (specify)

## 2025-01-02 NOTE — PROGRESS NOTE ADULT - ASSESSMENT
54M with PMH of HTN, HLD, ESRD on HD MWF via LUE AVF, ascites (requiring repeat paracenteses q4-6wks), NICM with moderate LV dysfunction w/ EF 35-40%(2023) and CAD s/p atherectomy + SALLIE to D1(4/11/2024) presents to [facility] transferred from [facility] for CABG eval  Nephro on [facility] for HD    ESRD on HD   Regular schedule MWF   Access LUE AVF  Center [facility]  Nephrologist Dr. Bailey  Last HD on 12/24  S/p HD on 12/27 12/29, 12/30 for hyperkalemia and 12/31  2 L UF today and the HD per cristin walters  Keep MAP>65  Renal Diet  Consent in physical chart    Hyperkalemia  Lokelma on non dialysis days  Monitor K     HTN  BP controlled   cw home meds  monitor bp     CKD MBD  Can send a PTH  Ca and Phos daily    Anemia  CRISTIANE with HD  Transfuse if hgb <7    CAD with multivessel disease  s/p CABG 12/30  CTICU following

## 2025-01-02 NOTE — DIETITIAN INITIAL EVALUATION ADULT - PERTINENT MEDS FT
MEDICATIONS  (STANDING):  acetaminophen     Tablet .. 650 milliGRAM(s) Oral every 6 hours  ascorbic acid 500 milliGRAM(s) Oral two times a day  aspirin enteric coated 81 milliGRAM(s) Oral daily  atorvastatin 80 milliGRAM(s) Oral daily  bisacodyl Suppository 10 milliGRAM(s) Rectal once  calcium acetate 667 milliGRAM(s) Oral three times a day with meals  chlorhexidine 0.12% Liquid 15 milliLiter(s) Oral Mucosa every 12 hours  chlorhexidine 2% Cloths 1 Application(s) Topical daily  dextrose 50% Injectable 50 milliLiter(s) IV Push every 15 minutes  dextrose 50% Injectable 25 milliLiter(s) IV Push every 15 minutes  DOBUTamine Infusion 5 MICROgram(s)/kG/Min (12.8 mL/Hr) IV Continuous <Continuous>  epoetin ignacia (EPOGEN) Injectable 09580 Unit(s) IV Push <User Schedule>  gabapentin 100 milliGRAM(s) Oral every 8 hours  heparin   Injectable 5000 Unit(s) SubCutaneous every 12 hours  insulin lispro (ADMELOG) corrective regimen sliding scale   SubCutaneous three times a day before meals  insulin lispro (ADMELOG) corrective regimen sliding scale   SubCutaneous at bedtime  lidocaine   4% Patch 2 Patch Transdermal daily  methocarbamol 500 milliGRAM(s) Oral every 8 hours  Nephro-elicia 1 Tablet(s) Oral daily  norepinephrine Infusion 0.05 MICROgram(s)/kG/Min (7.98 mL/Hr) IV Continuous <Continuous>  pantoprazole    Tablet 40 milliGRAM(s) Oral before breakfast  polyethylene glycol 3350 17 Gram(s) Oral daily  senna 2 Tablet(s) Oral at bedtime  sodium chloride 0.9%. 1000 milliLiter(s) (10 mL/Hr) IV Continuous <Continuous>  traZODone 100 milliGRAM(s) Oral at bedtime  vasopressin Infusion 0.03 Unit(s)/Min (4.5 mL/Hr) IV Continuous <Continuous>    MEDICATIONS  (PRN):  acetaminophen     Tablet .. 650 milliGRAM(s) Oral every 6 hours PRN Mild Pain (1 - 3)  oxyCODONE    IR 10 milliGRAM(s) Oral every 4 hours PRN Severe Pain (7 - 10)  oxyCODONE    IR 5 milliGRAM(s) Oral every 4 hours PRN Moderate Pain (4 - 6)

## 2025-01-02 NOTE — PROVIDER CONTACT NOTE (HYPOGLYCEMIA EVENT) - NS PROVIDER CONTACT CONTRIBUTING FACTORS OF EPISODE
Patient NPO greater than 8 hours Poor oral intake within the last 24 hours/Previous finger stick less than 100 mg/dL

## 2025-01-02 NOTE — DIETITIAN INITIAL EVALUATION ADULT - OTHER INFO
Patient reports 25lb weight loss from 205lb to 177lb in setting of recent hospitalizations (note patient had cholecystectomy 12/29/24 per chart). Reports with improved intake and drinking protein shakes he was able to gain weight to 187lb.  Dosing weight: 187.6lb/85.1kg (12/30).  Daily weights: 80.4kg (1/2, bed), 85.3kg (12/29, standing), 79.7kg (12/27), 81.3kg (12/25, standing), 83.6kg (12/24, standing)  IBW: 172lb.  Weight history per [facility]: 187lb (12/10/24), 191lb (11/27/24), 207.9lb (11/18/24), 203lb (12/8/23).  Weight appears decreasing, clinically significant x 1 month. RD to continue to monitor weight trends as available/able.    Nutrition-Related Concerns   -s/p CABG 12/30  -acute on chronic systolic heart failure  -hx ESRD on HD, elevated phosphorus today noted. ordered for phoslo.   -hx recurrent ascities requiring paracentesis Q 4-6 wks  -diagnosed with moderate chronic malnutrition on 11/25/24 during previous hospitalization.

## 2025-01-02 NOTE — DIETITIAN INITIAL EVALUATION ADULT - ENERGY INTAKE
RD observed good intake of breakfast, patient notes appetite and intake not as good as usual due to feeling weak after surgery.

## 2025-01-02 NOTE — PROGRESS NOTE ADULT - ASSESSMENT
54M with PMH of HTN, HLD, ESRD on HD MWF via LUE AVF, ascites (requiring repeat paracenteses q4-6wks), NICM with moderate LV dysfunction w/ EF 35-40%() and CAD s/p atherectomy + SALLIE to D1(2024) presents to [facility] transferred from [facility]. Patient initially presented to [facility] ED with shortness of breath. Of note he was recently admitted from - for acute cholecystitis s/p cholecystectomy. In [facility] ED, pt was hypoxic to 86% on RA, trop 1.7K, EKG no new changes, CXR fluid overload. Admitted for AHRF 2/2 fluid overload. Pt received HD on , ,  and . DAPT was resumed, TTE showed improvement in LVEF to 40-45%. Stress test was abnormal with 1mm inferior STD, reversible ischemia in the inferior wall and fixed defects in the lateral, anterior and apical walls with post stress EF of 24%.     Pt was then transferred to [facility] for cardiac cath which showed pLAD 70% ISR, mLCx 70%, D1 severe dz.     Patient now transferred to [facility] CTS Dr. Rivers for CABG eval.    # CAD s/p NSTEMI  Hx CAD s/p PCI LAD   Presented with ADHF elevated troponins  Positive NST  -Cath- pLAD 70% ISR, mLCx 70%, D1 severe dz.   TTE EF 35% Severe TR  Was on Plavix-p2y12- 321 been off Plavix since -POD#1-C3L free LIMA-LAD; SVG-Diag; PSS-svkqEW-Lamrcntcz on  Sinus rhythm,Amio for AF prophylaxis  -OOB off  Sinus rhythm   -POD#3-On /pressors-EF 25-30% RV failure with transaminitis-Sinus Rhythm        # Acute on Chronic Systolic Heart Failure  EF-35% ,severe TR  Had HD post op  GDMT post op  LVEF improved post bypass but now at 23-30%-BiV dysfunction on /pressors      # Ascites  Hx chronic ascites  Has drainage q4-6 weeks  Last drained -4600mL  ? ascites related to severe TR  Monitor        # ESRD  On HD  HD post op      # T2DM  A1c-5.6  RISS

## 2025-01-02 NOTE — DIETITIAN INITIAL EVALUATION ADULT - PERTINENT LABORATORY DATA
01-02    135  |  98  |  43[H]  ----------------------------<  166[H]  5.0   |  21[L]  |  5.03[H]    Ca    8.8      02 Jan 2025 00:32  Phos  7.0     01-02  Mg     2.0     01-02    TPro  6.0  /  Alb  3.3  /  TBili  0.6  /  DBili  x   /  AST  901[H]  /  ALT  632[H]  /  AlkPhos  90  01-02    POCT Blood Glucose.: 148 mg/dL (02 Jan 2025 06:08)  POCT Blood Glucose.: 170 mg/dL (01 Jan 2025 22:07)  POCT Blood Glucose.: 141 mg/dL (01 Jan 2025 16:35)  POCT Blood Glucose.: 165 mg/dL (01 Jan 2025 11:56)  A1C with Estimated Average Glucose Result: 5.6 % (12-24-24 @ 06:50)

## 2025-01-02 NOTE — PROGRESS NOTE ADULT - SUBJECTIVE AND OBJECTIVE BOX
Patient seen and examined at the bedside.    Remained critically ill on continuous ICU monitoring.    OBJECTIVE:  Vital Signs Last 24 Hrs  T(C): 38.4 (02 Jan 2025 20:00), Max: 39 (02 Jan 2025 18:15)  T(F): 101.2 (02 Jan 2025 20:00), Max: 102.2 (02 Jan 2025 18:15)  HR: 89 (02 Jan 2025 20:49) (66 - 91)  BP: --  BP(mean): --  RR: 17 (02 Jan 2025 20:49) (7 - 29)  SpO2: 97% (02 Jan 2025 20:49) (75% - 100%)    Parameters below as of 02 Jan 2025 20:49  Patient On (Oxygen Delivery Method): BiPAP/CPAP    O2 Concentration (%): 60      Physical Exam:   General: NAD   Neurology: Sedated  Eyes: bilateral pupils equal and reactive   ENT/Neck: Neck supple, trachea midline, No JVD   Respiratory: Clear bilaterally   CV: S1S2, no murmurs        [x] Sternal dressing [x] Mediastinal CT x2, [x] L Pleural CT        [x] Sinus rhythm  Abdominal: Soft, NT, ND +BS   Extremities: 1-2+ pedal edema noted, + peripheral pulses   Skin: No Rashes, Hematoma, Ecchymosis                      Assessment:  54M with PMH of HTN, HLD, ESRD on HD MWF via LUE AVF, ascites (requiring repeat paracenteses q4-6wks), NICM with moderate LV dysfunction w/ EF 35-40%(2023) and CAD s/p atherectomy + SALLIE to D1(4/11/2024) presents to [facility] transferred from [facility] for CABG eval  Nephro on [facility] for HD    Ischemic Cardiomyopathy  CAD s/p CABG x3  End Stage Renal Disease requiring hemodialysis  Hypotension without shock  Acute blood loss anemia  Leukocytosis    Plan:   ***Neuro***  [x] Sedated with Precedex  [x] Tylenol, Gabapentin and Oxycodone for pain management  On Robaxin and Trazadone  Post operative neuro assessment     ***Cardiovascular***  Invasive hemodynamic monitoring, assess perfusion indices   SR / CVP 12 / MAP 60 / Hct 26.6 / Lactate 1.6  [x] Vasopressin 0.03 units/min  [x] Dobutamine 7.5 mcgs/kg/min  [x] Levophed 0.05 mcgs/kg/min  [x] Lidocaine 20 mgs  [x] Volume: 250 cc Albumin  Reassessment of hemodynamics post resuscitation   Monitor chest tube outputs   [x] DVT ppx with Heparin  [x]  ASA  [x] Statin [x] Plavix  Serial EKG and cardiac enzymes   TTE    ***Pulmonary***  [x] HFNC 50L/80%  [x] BIPAP       Titration of FiO2 and PEEP, follow SpO2, CXR, blood gasses     ***GI***  [x] Diet: regular  [x] Protonix   Bowel regimen    ***Renal***  [x] ESRD on HD   Continue to monitor I/Os, BUN/Creatinine.   Replete lytes PRN  HD removed 2 L today    ***ID***  IV Vancomycin for empiric dosing      ***Endocrine***  [x] Stress Hyperglycemia [x]  DM2 [x] DM1 [x] Prediabetes : HbA1c 5.6%                - [x] ISS              - Need tight glycemic control to prevent wound infection.        Patient requires continuous monitoring with bedside rhythm monitoring, pulse oximetry monitoring, and continuous central venous and arterial pressure monitoring; and intermittent blood gas analysis. Care plan discussed with the ICU care team.   Patient remained critical, at risk for life threatening decompensation.    I have spent 60 minutes providing critical care management to this patient.    By signing my name below, I, Angelo Barbosa, attest that this documentation has been prepared under the direction and in the presence of MYRA Gil   Electronically signed: Autumn Ag, 01-02-25 @ 21:10    I, Xavier Isaacs, personally performed the services described in this documentation. all medical record entries made by the autumn were at my direction and in my presence. I have reviewed the chart and agree that the record reflects my personal performance and is accurate and complete  Electronically signed: MYRA Gil  Patient seen and examined at the bedside.    Remained critically ill on continuous ICU monitoring.    OBJECTIVE:  Vital Signs Last 24 Hrs  T(C): 38.4 (02 Jan 2025 20:00), Max: 39 (02 Jan 2025 18:15)  T(F): 101.2 (02 Jan 2025 20:00), Max: 102.2 (02 Jan 2025 18:15)  HR: 89 (02 Jan 2025 20:49) (66 - 91)  BP: --  BP(mean): --  RR: 17 (02 Jan 2025 20:49) (7 - 29)  SpO2: 97% (02 Jan 2025 20:49) (75% - 100%)    Parameters below as of 02 Jan 2025 20:49  Patient On (Oxygen Delivery Method): BiPAP/CPAP v --> Intubated     O2 Concentration (%): 60      Physical Exam:   General: NAD   Neurology: Sedated  Eyes: bilateral pupils equal and reactive   ENT/Neck: Neck supple, trachea midline, No JVD   Respiratory: Clear bilaterally, nonw intubated   CV: S1S2, no murmurs        [x] Sternal dressing [x] Mediastinal CT x2, [x] L Pleural CT        [x] Sinus rhythm  Abdominal: Soft, NT, ND +BS   Extremities: 1-2+ pedal edema noted, + peripheral pulses   Skin: No Rashes, Hematoma, Ecchymosis                      Assessment:  54M with PMH of HTN, HLD, ESRD on HD MWF via LUE AVF, ascites (requiring repeat paracenteses q4-6wks), NICM with moderate LV dysfunction w/ EF 35-40%(2023) and CAD s/p atherectomy + SALLIE to D1(4/11/2024) presents to [facility] transferred from [facility] for CABG eval  Nephro on [facility] for HD    Ischemic Cardiomyopathy  CAD s/p CABG x3  End Stage Renal Disease requiring hemodialysis  Hypotension without shock  Acute blood loss anemia  Leukocytosis    Plan:   ***Neuro***  [x] Sedated with Precedex  [x] Tylenol, Gabapentin and Oxycodone for pain management  On Robaxin and Trazadone  Post operative neuro assessment     ***Cardiovascular***  Invasive hemodynamic monitoring, assess perfusion indices   SR / CVP 12 / MAP 60 / Hct 26.6 / Lactate 1.6  [x] Vasopressin 0.03 units/min  [x] Dobutamine 7.5 mcgs/kg/min  [x] Levophed 0.05 mcgs/kg/min  [x] Lidocaine 20 mgs  [x] Volume: 250 cc Albumin  Reassessment of hemodynamics post resuscitation   Monitor chest tube outputs   [x] DVT ppx with Heparin  [x]  ASA  [x] Statin [x] Plavix  Serial EKG and cardiac enzymes   TTE    ***Pulmonary***  [x] HFNC 50L/80%  [x] BIPAP       Titration of FiO2 and PEEP, follow SpO2, CXR, blood gasses   - reintubated for hypoxia and hypercarbia     ***GI***  [x] Diet: regular  [x] Protonix   Bowel regimen    ***Renal***  [x] ESRD on HD   Continue to monitor I/Os, BUN/Creatinine.   Replete lytes PRN  HD removed 2 L today    ***ID***  IV Vancomycin for empiric dosing      ***Endocrine***  [x] Stress Hyperglycemia [x]  DM2 [x] DM1 [x] Prediabetes : HbA1c 5.6%                - [x] ISS              - Need tight glycemic control to prevent wound infection.        Patient requires continuous monitoring with bedside rhythm monitoring, pulse oximetry monitoring, and continuous central venous and arterial pressure monitoring; and intermittent blood gas analysis. Care plan discussed with the ICU care team.   Patient remained critical, at risk for life threatening decompensation.    I have spent 60 minutes providing critical care management to this patient.    By signing my name below, I, Angelo Barbosa, attest that this documentation has been prepared under the direction and in the presence of MYRA Gil   Electronically signed: Autumn Ag, 01-02-25 @ 21:10    I, Xavier Isaacs, personally performed the services described in this documentation. all medical record entries made by the autumn were at my direction and in my presence. I have reviewed the chart and agree that the record reflects my personal performance and is accurate and complete  Electronically signed: MYRA Gil

## 2025-01-03 ENCOUNTER — RESULT REVIEW (OUTPATIENT)
Age: 55
End: 2025-01-03

## 2025-01-03 LAB
-  CTX-M RESISTANCE MARKER: SIGNIFICANT CHANGE UP
ADD ON TEST-SPECIMEN IN LAB: SIGNIFICANT CHANGE UP
ALBUMIN FLD-MCNC: 2.2 G/DL — SIGNIFICANT CHANGE UP
ALBUMIN SERPL ELPH-MCNC: 3.1 G/DL — LOW (ref 3.3–5)
ALBUMIN SERPL ELPH-MCNC: 3.3 G/DL — SIGNIFICANT CHANGE UP (ref 3.3–5)
ALP SERPL-CCNC: 68 U/L — SIGNIFICANT CHANGE UP (ref 40–120)
ALP SERPL-CCNC: 76 U/L — SIGNIFICANT CHANGE UP (ref 40–120)
ALP SERPL-CCNC: 90 U/L — SIGNIFICANT CHANGE UP (ref 40–120)
ALT FLD-CCNC: 1002 U/L — HIGH (ref 10–45)
ALT FLD-CCNC: 588 U/L — HIGH (ref 10–45)
ALT FLD-CCNC: 927 U/L — HIGH (ref 10–45)
AMMONIA BLD-MCNC: 44 UMOL/L — SIGNIFICANT CHANGE UP (ref 11–55)
ANION GAP SERPL CALC-SCNC: 26 MMOL/L — HIGH (ref 5–17)
ANION GAP SERPL CALC-SCNC: 26 MMOL/L — HIGH (ref 5–17)
APPEARANCE UR: ABNORMAL
APTT BLD: 35.6 SEC — SIGNIFICANT CHANGE UP (ref 24.5–35.6)
APTT BLD: 40.1 SEC — HIGH (ref 24.5–35.6)
APTT BLD: 45.7 SEC — HIGH (ref 24.5–35.6)
AST SERPL-CCNC: 1170 U/L — HIGH (ref 10–40)
AST SERPL-CCNC: 1253 U/L — HIGH (ref 10–40)
AST SERPL-CCNC: 588 U/L — HIGH (ref 10–40)
BACTERIA # UR AUTO: ABNORMAL /HPF
BASE EXCESS BLDV CALC-SCNC: -3.4 MMOL/L — LOW (ref -2–3)
BASE EXCESS BLDV CALC-SCNC: -4.2 MMOL/L — LOW (ref -2–3)
BASE EXCESS BLDV CALC-SCNC: -4.4 MMOL/L — LOW (ref -2–3)
BASE EXCESS BLDV CALC-SCNC: -5.2 MMOL/L — LOW (ref -2–3)
BASE EXCESS BLDV CALC-SCNC: -5.6 MMOL/L — LOW (ref -2–3)
BASE EXCESS BLDV CALC-SCNC: -5.7 MMOL/L — LOW (ref -2–3)
BASE EXCESS BLDV CALC-SCNC: -6.3 MMOL/L — LOW (ref -2–3)
BASE EXCESS BLDV CALC-SCNC: -6.5 MMOL/L — LOW (ref -2–3)
BASE EXCESS BLDV CALC-SCNC: -8.1 MMOL/L — LOW (ref -2–3)
BASE EXCESS BLDV CALC-SCNC: -8.2 MMOL/L — LOW (ref -2–3)
BASOPHILS # BLD AUTO: 0 K/UL — SIGNIFICANT CHANGE UP (ref 0–0.2)
BASOPHILS # BLD AUTO: 0.01 K/UL — SIGNIFICANT CHANGE UP (ref 0–0.2)
BASOPHILS # BLD AUTO: 0.02 K/UL — SIGNIFICANT CHANGE UP (ref 0–0.2)
BASOPHILS NFR BLD AUTO: 0 % — SIGNIFICANT CHANGE UP (ref 0–2)
BASOPHILS NFR BLD AUTO: 0.4 % — SIGNIFICANT CHANGE UP (ref 0–2)
BASOPHILS NFR BLD AUTO: 0.9 % — SIGNIFICANT CHANGE UP (ref 0–2)
BILIRUB SERPL-MCNC: 0.8 MG/DL — SIGNIFICANT CHANGE UP (ref 0.2–1.2)
BILIRUB SERPL-MCNC: 1.5 MG/DL — HIGH (ref 0.2–1.2)
BILIRUB SERPL-MCNC: 1.7 MG/DL — HIGH (ref 0.2–1.2)
BUN SERPL-MCNC: 50 MG/DL — HIGH (ref 7–23)
BUN SERPL-MCNC: 51 MG/DL — HIGH (ref 7–23)
BUN SERPL-MCNC: 52 MG/DL — HIGH (ref 7–23)
CALCIUM SERPL-MCNC: 8.7 MG/DL — SIGNIFICANT CHANGE UP (ref 8.4–10.5)
CALCIUM SERPL-MCNC: 9 MG/DL — SIGNIFICANT CHANGE UP (ref 8.4–10.5)
CALCIUM SERPL-MCNC: 9.4 MG/DL — SIGNIFICANT CHANGE UP (ref 8.4–10.5)
CAST: 31 /LPF — HIGH (ref 0–4)
CHLORIDE SERPL-SCNC: 94 MMOL/L — LOW (ref 96–108)
CHLORIDE SERPL-SCNC: 95 MMOL/L — LOW (ref 96–108)
CHLORIDE SERPL-SCNC: 96 MMOL/L — SIGNIFICANT CHANGE UP (ref 96–108)
CO2 BLDV-SCNC: 19 MMOL/L — LOW (ref 22–26)
CO2 BLDV-SCNC: 19 MMOL/L — LOW (ref 22–26)
CO2 BLDV-SCNC: 20 MMOL/L — LOW (ref 22–26)
CO2 BLDV-SCNC: 21 MMOL/L — LOW (ref 22–26)
CO2 BLDV-SCNC: 21 MMOL/L — LOW (ref 22–26)
CO2 BLDV-SCNC: 22 MMOL/L — SIGNIFICANT CHANGE UP (ref 22–26)
CO2 BLDV-SCNC: 22 MMOL/L — SIGNIFICANT CHANGE UP (ref 22–26)
CO2 BLDV-SCNC: 23 MMOL/L — SIGNIFICANT CHANGE UP (ref 22–26)
CO2 BLDV-SCNC: 23 MMOL/L — SIGNIFICANT CHANGE UP (ref 22–26)
CO2 BLDV-SCNC: 24 MMOL/L — SIGNIFICANT CHANGE UP (ref 22–26)
CO2 BLDV-SCNC: 25 MMOL/L — SIGNIFICANT CHANGE UP (ref 22–26)
CO2 SERPL-SCNC: 16 MMOL/L — LOW (ref 22–31)
CO2 SERPL-SCNC: 17 MMOL/L — LOW (ref 22–31)
CO2 SERPL-SCNC: 18 MMOL/L — LOW (ref 22–31)
COLOR FLD: YELLOW
COLOR SPEC: SIGNIFICANT CHANGE UP
CREAT SERPL-MCNC: 5.93 MG/DL — HIGH (ref 0.5–1.3)
CREAT SERPL-MCNC: 6.21 MG/DL — HIGH (ref 0.5–1.3)
CREAT SERPL-MCNC: 6.32 MG/DL — HIGH (ref 0.5–1.3)
DIFF PNL FLD: ABNORMAL
E COLI DNA BLD POS QL NAA+NON-PROBE: SIGNIFICANT CHANGE UP
EGFR: 10 ML/MIN/1.73M2 — LOW
EOSINOPHIL # BLD AUTO: 0.03 K/UL — SIGNIFICANT CHANGE UP (ref 0–0.5)
EOSINOPHIL # BLD AUTO: 0.05 K/UL — SIGNIFICANT CHANGE UP (ref 0–0.5)
EOSINOPHIL # BLD AUTO: 0.06 K/UL — SIGNIFICANT CHANGE UP (ref 0–0.5)
EOSINOPHIL # FLD: 1 % — SIGNIFICANT CHANGE UP
EOSINOPHIL NFR BLD AUTO: 4.5 % — SIGNIFICANT CHANGE UP (ref 0–6)
EOSINOPHIL NFR BLD AUTO: 4.6 % — SIGNIFICANT CHANGE UP (ref 0–6)
FIBRINOGEN PPP-MCNC: 328 MG/DL — SIGNIFICANT CHANGE UP (ref 200–445)
FLUAV AG NPH QL: SIGNIFICANT CHANGE UP
FLUBV AG NPH QL: SIGNIFICANT CHANGE UP
FLUID INTAKE SUBSTANCE CLASS: SIGNIFICANT CHANGE UP
GAS PNL BLDA: SIGNIFICANT CHANGE UP
GAS PNL BLDV: SIGNIFICANT CHANGE UP
GIANT PLATELETS BLD QL SMEAR: PRESENT — SIGNIFICANT CHANGE UP
GLUCOSE FLD-MCNC: 110 MG/DL — SIGNIFICANT CHANGE UP
GLUCOSE SERPL-MCNC: 138 MG/DL — HIGH (ref 70–99)
GLUCOSE SERPL-MCNC: 144 MG/DL — HIGH (ref 70–99)
GLUCOSE SERPL-MCNC: 87 MG/DL — SIGNIFICANT CHANGE UP (ref 70–99)
GLUCOSE UR QL: NEGATIVE MG/DL — SIGNIFICANT CHANGE UP
GRAM STN FLD: ABNORMAL
GRAM STN FLD: SIGNIFICANT CHANGE UP
HCO3 BLDV-SCNC: 18 MMOL/L — LOW (ref 22–29)
HCO3 BLDV-SCNC: 18 MMOL/L — LOW (ref 22–29)
HCO3 BLDV-SCNC: 19 MMOL/L — LOW (ref 22–29)
HCO3 BLDV-SCNC: 20 MMOL/L — LOW (ref 22–29)
HCO3 BLDV-SCNC: 20 MMOL/L — LOW (ref 22–29)
HCO3 BLDV-SCNC: 21 MMOL/L — LOW (ref 22–29)
HCO3 BLDV-SCNC: 21 MMOL/L — LOW (ref 22–29)
HCO3 BLDV-SCNC: 22 MMOL/L — SIGNIFICANT CHANGE UP (ref 22–29)
HCO3 BLDV-SCNC: 22 MMOL/L — SIGNIFICANT CHANGE UP (ref 22–29)
HCT VFR BLD CALC: 25.5 % — LOW (ref 39–50)
HCT VFR BLD CALC: 28.7 % — LOW (ref 39–50)
HCT VFR BLD CALC: 29.9 % — LOW (ref 39–50)
HCT VFR BLD CALC: 31.1 % — LOW (ref 39–50)
HGB BLD-MCNC: 10.1 G/DL — LOW (ref 13–17)
HGB BLD-MCNC: 8.1 G/DL — LOW (ref 13–17)
HGB BLD-MCNC: 9 G/DL — LOW (ref 13–17)
HGB BLD-MCNC: 9.6 G/DL — LOW (ref 13–17)
HOROWITZ INDEX BLDV+IHG-RTO: 100 — SIGNIFICANT CHANGE UP
HOROWITZ INDEX BLDV+IHG-RTO: 50 — SIGNIFICANT CHANGE UP
HOROWITZ INDEX BLDV+IHG-RTO: 60 — SIGNIFICANT CHANGE UP
HOROWITZ INDEX BLDV+IHG-RTO: 70 — SIGNIFICANT CHANGE UP
HOROWITZ INDEX BLDV+IHG-RTO: 80 — SIGNIFICANT CHANGE UP
IMM GRANULOCYTES NFR BLD AUTO: 0.8 % — SIGNIFICANT CHANGE UP (ref 0–0.9)
INR BLD: 2.19 RATIO — HIGH (ref 0.85–1.16)
INR BLD: 2.57 RATIO — HIGH (ref 0.85–1.16)
KETONES UR-MCNC: ABNORMAL MG/DL
LDH SERPL L TO P-CCNC: 245 U/L — SIGNIFICANT CHANGE UP
LEUKOCYTE ESTERASE UR-ACNC: ABNORMAL
LYMPHOCYTES # BLD AUTO: 0.09 K/UL — LOW (ref 1–3.3)
LYMPHOCYTES # BLD AUTO: 0.21 K/UL — LOW (ref 1–3.3)
LYMPHOCYTES # BLD AUTO: 0.27 K/UL — LOW (ref 1–3.3)
LYMPHOCYTES # BLD AUTO: 18.9 % — SIGNIFICANT CHANGE UP (ref 13–44)
LYMPHOCYTES # BLD AUTO: 7 % — LOW (ref 13–44)
LYMPHOCYTES # FLD: 30 % — SIGNIFICANT CHANGE UP
MAGNESIUM SERPL-MCNC: 1.8 MG/DL — SIGNIFICANT CHANGE UP (ref 1.6–2.6)
MAGNESIUM SERPL-MCNC: 2.1 MG/DL — SIGNIFICANT CHANGE UP (ref 1.6–2.6)
MANUAL SMEAR VERIFICATION: SIGNIFICANT CHANGE UP
MCHC RBC-ENTMCNC: 29.5 PG — SIGNIFICANT CHANGE UP (ref 27–34)
MCHC RBC-ENTMCNC: 29.6 PG — SIGNIFICANT CHANGE UP (ref 27–34)
MCHC RBC-ENTMCNC: 30.7 PG — SIGNIFICANT CHANGE UP (ref 27–34)
MCHC RBC-ENTMCNC: 31.4 G/DL — LOW (ref 32–36)
MCHC RBC-ENTMCNC: 31.8 G/DL — LOW (ref 32–36)
MCHC RBC-ENTMCNC: 32.1 G/DL — SIGNIFICANT CHANGE UP (ref 32–36)
MCHC RBC-ENTMCNC: 32.5 G/DL — SIGNIFICANT CHANGE UP (ref 32–36)
MCV RBC AUTO: 90.9 FL — SIGNIFICANT CHANGE UP (ref 80–100)
MCV RBC AUTO: 92.3 FL — SIGNIFICANT CHANGE UP (ref 80–100)
MCV RBC AUTO: 94.7 FL — SIGNIFICANT CHANGE UP (ref 80–100)
MCV RBC AUTO: 96.6 FL — SIGNIFICANT CHANGE UP (ref 80–100)
METAMYELOCYTES # FLD: 12.8 % — HIGH (ref 0–0)
METAMYELOCYTES NFR BLD: 12.8 % — HIGH (ref 0–0)
METHOD TYPE: SIGNIFICANT CHANGE UP
MONOCYTES # BLD AUTO: 0.06 K/UL — SIGNIFICANT CHANGE UP (ref 0–0.9)
MONOCYTES # BLD AUTO: 0.3 K/UL — SIGNIFICANT CHANGE UP (ref 0–0.9)
MONOCYTES NFR BLD AUTO: 4.7 % — SIGNIFICANT CHANGE UP (ref 2–14)
MONOCYTES NFR BLD AUTO: 6.4 % — SIGNIFICANT CHANGE UP (ref 2–14)
MONOS+MACROS # FLD: 24 % — SIGNIFICANT CHANGE UP
MYELOCYTES NFR BLD: 2.3 % — HIGH (ref 0–0)
NEUTROPHILS # BLD AUTO: 0.81 K/UL — LOW (ref 1.8–7.4)
NEUTROPHILS # BLD AUTO: 0.93 K/UL — LOW (ref 1.8–7.4)
NEUTROPHILS # BLD AUTO: 4.05 K/UL — SIGNIFICANT CHANGE UP (ref 1.8–7.4)
NEUTROPHILS NFR BLD AUTO: 45.3 % — SIGNIFICANT CHANGE UP (ref 43–77)
NEUTROPHILS NFR BLD AUTO: 73 % — SIGNIFICANT CHANGE UP (ref 43–77)
NEUTROPHILS NFR BLD AUTO: 86.1 % — HIGH (ref 43–77)
NEUTROPHILS-BODY FLUID: 45 % — SIGNIFICANT CHANGE UP
NEUTS BAND # BLD: 23.3 % — HIGH (ref 0–8)
NEUTS BAND NFR BLD: 23.3 % — HIGH (ref 0–8)
NIGHT BLUE STAIN TISS: SIGNIFICANT CHANGE UP
NITRITE UR-MCNC: NEGATIVE — SIGNIFICANT CHANGE UP
NRBC # BLD: 0 /100 WBCS — SIGNIFICANT CHANGE UP (ref 0–0)
NRBC # BLD: 0 /100 WBCS — SIGNIFICANT CHANGE UP (ref 0–0)
NRBC # BLD: 4 /100 WBCS — HIGH (ref 0–0)
NRBC # BLD: 7 /100 WBCS — HIGH (ref 0–0)
NRBC BLD-RTO: 0 /100 WBCS — SIGNIFICANT CHANGE UP (ref 0–0)
NRBC BLD-RTO: 0 /100 WBCS — SIGNIFICANT CHANGE UP (ref 0–0)
NRBC BLD-RTO: 4 /100 WBCS — HIGH (ref 0–0)
NRBC BLD-RTO: 7 /100 WBCS — HIGH (ref 0–0)
PCO2 BLDV: 36 MMHG — LOW (ref 42–55)
PCO2 BLDV: 37 MMHG — LOW (ref 42–55)
PCO2 BLDV: 38 MMHG — LOW (ref 42–55)
PCO2 BLDV: 39 MMHG — LOW (ref 42–55)
PCO2 BLDV: 40 MMHG — LOW (ref 42–55)
PCO2 BLDV: 42 MMHG — SIGNIFICANT CHANGE UP (ref 42–55)
PCO2 BLDV: 44 MMHG — SIGNIFICANT CHANGE UP (ref 42–55)
PCO2 BLDV: 46 MMHG — SIGNIFICANT CHANGE UP (ref 42–55)
PCO2 BLDV: 46 MMHG — SIGNIFICANT CHANGE UP (ref 42–55)
PCO2 BLDV: 49 MMHG — SIGNIFICANT CHANGE UP (ref 42–55)
PH BLDV: 7.26 — LOW (ref 7.32–7.43)
PH BLDV: 7.28 — LOW (ref 7.32–7.43)
PH BLDV: 7.29 — LOW (ref 7.32–7.43)
PH BLDV: 7.29 — LOW (ref 7.32–7.43)
PH BLDV: 7.3 — LOW (ref 7.32–7.43)
PH BLDV: 7.3 — LOW (ref 7.32–7.43)
PH BLDV: 7.32 — SIGNIFICANT CHANGE UP (ref 7.32–7.43)
PH BLDV: 7.32 — SIGNIFICANT CHANGE UP (ref 7.32–7.43)
PH BLDV: 7.34 — SIGNIFICANT CHANGE UP (ref 7.32–7.43)
PH UR: 6.5 — SIGNIFICANT CHANGE UP (ref 5–8)
PHOSPHATE SERPL-MCNC: 6.4 MG/DL — HIGH (ref 2.5–4.5)
PHOSPHATE SERPL-MCNC: 6.7 MG/DL — HIGH (ref 2.5–4.5)
PLAT MORPH BLD: NORMAL — SIGNIFICANT CHANGE UP
PLATELET # BLD AUTO: 111 K/UL — LOW (ref 150–400)
PLATELET # BLD AUTO: 119 K/UL — LOW (ref 150–400)
PLATELET # BLD AUTO: 123 K/UL — LOW (ref 150–400)
PO2 BLDV: 37 MMHG — SIGNIFICANT CHANGE UP (ref 25–45)
PO2 BLDV: 43 MMHG — SIGNIFICANT CHANGE UP (ref 25–45)
PO2 BLDV: 46 MMHG — HIGH (ref 25–45)
PO2 BLDV: 47 MMHG — HIGH (ref 25–45)
PO2 BLDV: 52 MMHG — HIGH (ref 25–45)
PO2 BLDV: 52 MMHG — HIGH (ref 25–45)
PO2 BLDV: 54 MMHG — HIGH (ref 25–45)
PO2 BLDV: 54 MMHG — HIGH (ref 25–45)
POTASSIUM SERPL-MCNC: 4.8 MMOL/L — SIGNIFICANT CHANGE UP (ref 3.5–5.3)
POTASSIUM SERPL-MCNC: 4.9 MMOL/L — SIGNIFICANT CHANGE UP (ref 3.5–5.3)
POTASSIUM SERPL-SCNC: 4.1 MMOL/L — SIGNIFICANT CHANGE UP (ref 3.5–5.3)
POTASSIUM SERPL-SCNC: 4.8 MMOL/L — SIGNIFICANT CHANGE UP (ref 3.5–5.3)
POTASSIUM SERPL-SCNC: 4.9 MMOL/L — SIGNIFICANT CHANGE UP (ref 3.5–5.3)
PROT FLD-MCNC: 4 G/DL — SIGNIFICANT CHANGE UP
PROT SERPL-MCNC: 5.6 G/DL — LOW (ref 6–8.3)
PROT SERPL-MCNC: 5.7 G/DL — LOW (ref 6–8.3)
PROT SERPL-MCNC: 5.8 G/DL — LOW (ref 6–8.3)
PROT UR-MCNC: 300 MG/DL
PROTHROM AB SERPL-ACNC: 24.8 SEC — HIGH (ref 9.9–13.4)
PROTHROM AB SERPL-ACNC: 29.3 SEC — HIGH (ref 9.9–13.4)
PROTHROM AB SERPL-ACNC: 32.1 SEC — HIGH (ref 9.9–13.4)
RBC # BLD: 2.64 M/UL — LOW (ref 4.2–5.8)
RBC # BLD: 3.03 M/UL — LOW (ref 4.2–5.8)
RBC # BLD: 3.24 M/UL — LOW (ref 4.2–5.8)
RBC # BLD: 3.42 M/UL — LOW (ref 4.2–5.8)
RBC # FLD: 18.2 % — HIGH (ref 10.3–14.5)
RBC # FLD: 18.6 % — HIGH (ref 10.3–14.5)
RBC # FLD: 18.8 % — HIGH (ref 10.3–14.5)
RBC # FLD: 19 % — HIGH (ref 10.3–14.5)
RBC BLD AUTO: SIGNIFICANT CHANGE UP
RCV VOL RI: <2000 CELLS/UL — HIGH
REVIEW: SIGNIFICANT CHANGE UP
RSV RNA NPH QL NAA+NON-PROBE: SIGNIFICANT CHANGE UP
SAO2 % BLDV: 67.2 % — SIGNIFICANT CHANGE UP (ref 67–88)
SAO2 % BLDV: 71 % — SIGNIFICANT CHANGE UP (ref 67–88)
SAO2 % BLDV: 75.8 % — SIGNIFICANT CHANGE UP (ref 67–88)
SAO2 % BLDV: 79.6 % — SIGNIFICANT CHANGE UP (ref 67–88)
SAO2 % BLDV: 81.6 % — SIGNIFICANT CHANGE UP (ref 67–88)
SAO2 % BLDV: 83.6 % — SIGNIFICANT CHANGE UP (ref 67–88)
SAO2 % BLDV: 84.9 % — SIGNIFICANT CHANGE UP (ref 67–88)
SAO2 % BLDV: 85.6 % — SIGNIFICANT CHANGE UP (ref 67–88)
SARS-COV-2 RNA SPEC QL NAA+PROBE: SIGNIFICANT CHANGE UP
SODIUM SERPL-SCNC: 137 MMOL/L — SIGNIFICANT CHANGE UP (ref 135–145)
SODIUM SERPL-SCNC: 137 MMOL/L — SIGNIFICANT CHANGE UP (ref 135–145)
SODIUM SERPL-SCNC: 138 MMOL/L — SIGNIFICANT CHANGE UP (ref 135–145)
SP GR SPEC: 1.02 — SIGNIFICANT CHANGE UP (ref 1–1.03)
SPECIMEN SOURCE: SIGNIFICANT CHANGE UP
SPERM AB SPEC-ACNC: PRESENT
SQUAMOUS # UR AUTO: 17 /HPF — HIGH (ref 0–5)
TOTAL CELLS COUNTED, BODY FLUID: 96 CELLS — SIGNIFICANT CHANGE UP
TOTAL NUCLEATED CELL COUNT, BODY FLUID: 96 CELLS/UL — SIGNIFICANT CHANGE UP
TUBE TYPE: SIGNIFICANT CHANGE UP
UROBILINOGEN FLD QL: 0.2 MG/DL — SIGNIFICANT CHANGE UP (ref 0.2–1)
VANCOMYCIN TROUGH SERPL-MCNC: 9.3 UG/ML — LOW (ref 10–20)
WBC # BLD: 1.11 K/UL — LOW (ref 3.8–10.5)
WBC # BLD: 1.35 K/UL — LOW (ref 3.8–10.5)
WBC # BLD: 4.71 K/UL — SIGNIFICANT CHANGE UP (ref 3.8–10.5)
WBC # BLD: 9.39 K/UL — SIGNIFICANT CHANGE UP (ref 3.8–10.5)
WBC # FLD AUTO: 1.11 K/UL — LOW (ref 3.8–10.5)
WBC # FLD AUTO: 1.35 K/UL — LOW (ref 3.8–10.5)
WBC # FLD AUTO: 4.71 K/UL — SIGNIFICANT CHANGE UP (ref 3.8–10.5)
WBC COUNT.: 88 CELLS/UL — SIGNIFICANT CHANGE UP
WBC UR QL: 238 /HPF — HIGH (ref 0–5)

## 2025-01-03 PROCEDURE — 76700 US EXAM ABDOM COMPLETE: CPT | Mod: 26

## 2025-01-03 PROCEDURE — 36556 INSERT NON-TUNNEL CV CATH: CPT | Mod: RT

## 2025-01-03 PROCEDURE — 93325 DOPPLER ECHO COLOR FLOW MAPG: CPT | Mod: 26,XU

## 2025-01-03 PROCEDURE — 99223 1ST HOSP IP/OBS HIGH 75: CPT

## 2025-01-03 PROCEDURE — 31624 DX BRONCHOSCOPE/LAVAGE: CPT

## 2025-01-03 PROCEDURE — 99291 CRITICAL CARE FIRST HOUR: CPT | Mod: 25

## 2025-01-03 PROCEDURE — 36620 INSERTION CATHETER ARTERY: CPT | Mod: RT

## 2025-01-03 PROCEDURE — 93308 TTE F-UP OR LMTD: CPT | Mod: 26,XP

## 2025-01-03 PROCEDURE — 93306 TTE W/DOPPLER COMPLETE: CPT | Mod: 26

## 2025-01-03 PROCEDURE — G0545: CPT

## 2025-01-03 PROCEDURE — 49083 ABD PARACENTESIS W/IMAGING: CPT

## 2025-01-03 PROCEDURE — 71045 X-RAY EXAM CHEST 1 VIEW: CPT | Mod: 26

## 2025-01-03 RX ORDER — HYDROCORTISONE 20 MG
100 TABLET ORAL EVERY 8 HOURS
Refills: 0 | Status: COMPLETED | OUTPATIENT
Start: 2025-01-03 | End: 2025-01-04

## 2025-01-03 RX ORDER — DEXTROSE MONOHYDRATE 100 G/1000ML
1000 INJECTION, SOLUTION INTRAVENOUS
Refills: 0 | Status: DISCONTINUED | OUTPATIENT
Start: 2025-01-03 | End: 2025-01-05

## 2025-01-03 RX ORDER — ALBUMIN (HUMAN) 12.5 G/50ML
250 INJECTION, SOLUTION INTRAVENOUS ONCE
Refills: 0 | Status: COMPLETED | OUTPATIENT
Start: 2025-01-03 | End: 2025-01-03

## 2025-01-03 RX ORDER — CALCIUM CHLORIDE 100 MG/ML
1000 INJECTION, SOLUTION INTRAVENOUS ONCE
Refills: 0 | Status: COMPLETED | OUTPATIENT
Start: 2025-01-03 | End: 2025-01-03

## 2025-01-03 RX ORDER — FENTANYL CITRATE-0.9 % NACL/PF 100MCG/2ML
25 SYRINGE (ML) INTRAVENOUS
Refills: 0 | Status: DISCONTINUED | OUTPATIENT
Start: 2025-01-03 | End: 2025-01-10

## 2025-01-03 RX ORDER — PHENYLEPHRINE HCL IN 0.9% NACL 0.5 MG/5ML
1000 SYRINGE (ML) INTRAVENOUS ONCE
Refills: 0 | Status: COMPLETED | OUTPATIENT
Start: 2025-01-03 | End: 2025-01-03

## 2025-01-03 RX ORDER — MEROPENEM 1 G/30ML
500 INJECTION INTRAVENOUS EVERY 24 HOURS
Refills: 0 | Status: DISCONTINUED | OUTPATIENT
Start: 2025-01-03 | End: 2025-01-03

## 2025-01-03 RX ORDER — SODIUM BICARBONATE 1 MEQ/ML
50 SYRINGE (ML) INTRAVENOUS ONCE
Refills: 0 | Status: COMPLETED | OUTPATIENT
Start: 2025-01-03 | End: 2025-01-03

## 2025-01-03 RX ORDER — SODIUM BICARBONATE 1 MEQ/ML
50 SYRINGE (ML) INTRAVENOUS
Refills: 0 | Status: COMPLETED | OUTPATIENT
Start: 2025-01-03 | End: 2025-01-03

## 2025-01-03 RX ORDER — ALBUMIN (HUMAN) 12.5 G/50ML
100 INJECTION, SOLUTION INTRAVENOUS ONCE
Refills: 0 | Status: COMPLETED | OUTPATIENT
Start: 2025-01-03 | End: 2025-01-03

## 2025-01-03 RX ORDER — DEXTROSE 50 % IN WATER 50 %
50 SYRINGE (ML) INTRAVENOUS ONCE
Refills: 0 | Status: COMPLETED | OUTPATIENT
Start: 2025-01-03 | End: 2025-01-03

## 2025-01-03 RX ORDER — CALCIUM GLUCONATE 20 MG/ML
2 INJECTION, SOLUTION INTRAVENOUS ONCE
Refills: 0 | Status: COMPLETED | OUTPATIENT
Start: 2025-01-03 | End: 2025-01-03

## 2025-01-03 RX ORDER — ASPIRIN 325 MG
81 TABLET ORAL DAILY
Refills: 0 | Status: DISCONTINUED | OUTPATIENT
Start: 2025-01-03 | End: 2025-03-19

## 2025-01-03 RX ORDER — FENTANYL CITRATE-0.9 % NACL/PF 100MCG/2ML
12.5 SYRINGE (ML) INTRAVENOUS
Refills: 0 | Status: DISCONTINUED | OUTPATIENT
Start: 2025-01-03 | End: 2025-01-06

## 2025-01-03 RX ORDER — FILGRASTIM 300 UG/.5ML
300 INJECTION, SOLUTION INTRAVENOUS; SUBCUTANEOUS ONCE
Refills: 0 | Status: COMPLETED | OUTPATIENT
Start: 2025-01-03 | End: 2025-01-03

## 2025-01-03 RX ORDER — GENTAMICIN SULFATE 40 MG/ML
300 VIAL (ML) INJECTION ONCE
Refills: 0 | Status: DISCONTINUED | OUTPATIENT
Start: 2025-01-03 | End: 2025-01-03

## 2025-01-03 RX ORDER — CALCIUM CHLORIDE 100 MG/ML
1000 INJECTION, SOLUTION INTRAVENOUS ONCE
Refills: 0 | Status: COMPLETED | OUTPATIENT
Start: 2025-01-03 | End: 2025-01-02

## 2025-01-03 RX ORDER — MIDAZOLAM IN 0.9 % SOD.CHLORID 1 MG/ML
2 PLASTIC BAG, INJECTION (ML) INTRAVENOUS ONCE
Refills: 0 | Status: DISCONTINUED | OUTPATIENT
Start: 2025-01-03 | End: 2025-01-03

## 2025-01-03 RX ORDER — METOCLOPRAMIDE HCL 10 MG
10 TABLET ORAL EVERY 8 HOURS
Refills: 0 | Status: COMPLETED | OUTPATIENT
Start: 2025-01-03 | End: 2025-01-05

## 2025-01-03 RX ORDER — FENTANYL CITRATE-0.9 % NACL/PF 100MCG/2ML
75 SYRINGE (ML) INTRAVENOUS ONCE
Refills: 0 | Status: DISCONTINUED | OUTPATIENT
Start: 2025-01-03 | End: 2025-01-03

## 2025-01-03 RX ORDER — MEROPENEM 1 G/30ML
1000 INJECTION INTRAVENOUS EVERY 8 HOURS
Refills: 0 | Status: DISCONTINUED | OUTPATIENT
Start: 2025-01-03 | End: 2025-01-10

## 2025-01-03 RX ORDER — MAGNESIUM SULFATE 500 MG/ML
2 SYRINGE (ML) INJECTION ONCE
Refills: 0 | Status: COMPLETED | OUTPATIENT
Start: 2025-01-03 | End: 2025-01-03

## 2025-01-03 RX ORDER — VANCOMYCIN HCL IN 5 % DEXTROSE 1.5G/250ML
1000 PLASTIC BAG, INJECTION (ML) INTRAVENOUS EVERY 12 HOURS
Refills: 0 | Status: DISCONTINUED | OUTPATIENT
Start: 2025-01-03 | End: 2025-01-04

## 2025-01-03 RX ORDER — GENTAMICIN SULFATE 40 MG/ML
250 VIAL (ML) INJECTION ONCE
Refills: 0 | Status: COMPLETED | OUTPATIENT
Start: 2025-01-03 | End: 2025-01-03

## 2025-01-03 RX ORDER — DEXTROSE 50 % IN WATER 50 %
25 SYRINGE (ML) INTRAVENOUS ONCE
Refills: 0 | Status: COMPLETED | OUTPATIENT
Start: 2025-01-03 | End: 2025-01-03

## 2025-01-03 RX ORDER — ROCURONIUM BROMIDE 10 MG/ML
10 INJECTION, SOLUTION INTRAVENOUS ONCE
Refills: 0 | Status: COMPLETED | OUTPATIENT
Start: 2025-01-03 | End: 2025-01-03

## 2025-01-03 RX ORDER — FENTANYL CITRATE-0.9 % NACL/PF 100MCG/2ML
50 SYRINGE (ML) INTRAVENOUS ONCE
Refills: 0 | Status: DISCONTINUED | OUTPATIENT
Start: 2025-01-03 | End: 2025-01-03

## 2025-01-03 RX ADMIN — ALBUMIN (HUMAN) 50 MILLILITER(S): 12.5 INJECTION, SOLUTION INTRAVENOUS at 17:55

## 2025-01-03 RX ADMIN — MEROPENEM 100 MILLIGRAM(S): 1 INJECTION INTRAVENOUS at 20:10

## 2025-01-03 RX ADMIN — DOBUTAMINE 19.1 MICROGRAM(S)/KG/MIN: 250 INJECTION INTRAVENOUS at 20:21

## 2025-01-03 RX ADMIN — Medication 50 MILLIEQUIVALENT(S): at 01:50

## 2025-01-03 RX ADMIN — Medication 500 MILLIGRAM(S): at 17:14

## 2025-01-03 RX ADMIN — Medication 9.57 MICROGRAM(S)/KG/MIN: at 00:50

## 2025-01-03 RX ADMIN — EPOETIN ALFA 10000 UNIT(S): 10000 SOLUTION INTRAVENOUS; SUBCUTANEOUS at 12:10

## 2025-01-03 RX ADMIN — Medication 50 MILLIEQUIVALENT(S): at 11:29

## 2025-01-03 RX ADMIN — Medication 250 MILLIGRAM(S): at 22:57

## 2025-01-03 RX ADMIN — Medication 12.8 MICROGRAM(S)/KG/MIN: at 09:48

## 2025-01-03 RX ADMIN — Medication 15 MILLILITER(S): at 17:14

## 2025-01-03 RX ADMIN — CALCIUM GLUCONATE 200 GRAM(S): 20 INJECTION, SOLUTION INTRAVENOUS at 20:42

## 2025-01-03 RX ADMIN — Medication 50 MILLIEQUIVALENT(S): at 17:08

## 2025-01-03 RX ADMIN — Medication 50 MILLIEQUIVALENT(S): at 17:15

## 2025-01-03 RX ADMIN — Medication 50 MILLILITER(S): at 02:34

## 2025-01-03 RX ADMIN — Medication 12.8 MICROGRAM(S)/KG/MIN: at 20:21

## 2025-01-03 RX ADMIN — CALCIUM GLUCONATE 200 GRAM(S): 20 INJECTION, SOLUTION INTRAVENOUS at 13:57

## 2025-01-03 RX ADMIN — Medication 212.5 MILLIGRAM(S): at 11:49

## 2025-01-03 RX ADMIN — IPRATROPIUM BROMIDE AND ALBUTEROL SULFATE 3 MILLILITER(S): .5; 2.5 SOLUTION RESPIRATORY (INHALATION) at 11:28

## 2025-01-03 RX ADMIN — Medication 25 GRAM(S): at 01:41

## 2025-01-03 RX ADMIN — ALBUMIN (HUMAN) 125 MILLILITER(S): 12.5 INJECTION, SOLUTION INTRAVENOUS at 09:38

## 2025-01-03 RX ADMIN — Medication 50 MICROGRAM(S): at 06:30

## 2025-01-03 RX ADMIN — Medication 12.5 MICROGRAM(S): at 20:30

## 2025-01-03 RX ADMIN — IPRATROPIUM BROMIDE AND ALBUTEROL SULFATE 3 MILLILITER(S): .5; 2.5 SOLUTION RESPIRATORY (INHALATION) at 23:13

## 2025-01-03 RX ADMIN — DEXTROSE MONOHYDRATE 30 MILLILITER(S): 100 INJECTION, SOLUTION INTRAVENOUS at 20:20

## 2025-01-03 RX ADMIN — VASOPRESSIN 4.5 UNIT(S)/MIN: 20 INJECTION INTRAVENOUS at 20:21

## 2025-01-03 RX ADMIN — Medication 1 APPLICATION(S): at 22:05

## 2025-01-03 RX ADMIN — NOREPINEPHRINE BITARTRATE 7.98 MICROGRAM(S)/KG/MIN: 8 SOLUTION at 07:51

## 2025-01-03 RX ADMIN — Medication 12.5 MICROGRAM(S): at 21:55

## 2025-01-03 RX ADMIN — Medication 81 MILLIGRAM(S): at 13:31

## 2025-01-03 RX ADMIN — Medication 50 MILLIEQUIVALENT(S): at 06:52

## 2025-01-03 RX ADMIN — Medication 12.5 MICROGRAM(S): at 04:25

## 2025-01-03 RX ADMIN — Medication 75 MICROGRAM(S): at 12:00

## 2025-01-03 RX ADMIN — Medication 100 MILLIGRAM(S): at 00:49

## 2025-01-03 RX ADMIN — Medication 12.5 MICROGRAM(S): at 20:45

## 2025-01-03 RX ADMIN — Medication 1000 MICROGRAM(S): at 01:40

## 2025-01-03 RX ADMIN — Medication 100 MILLIGRAM(S): at 21:06

## 2025-01-03 RX ADMIN — NOREPINEPHRINE BITARTRATE 7.98 MICROGRAM(S)/KG/MIN: 8 SOLUTION at 20:21

## 2025-01-03 RX ADMIN — IPRATROPIUM BROMIDE AND ALBUTEROL SULFATE 3 MILLILITER(S): .5; 2.5 SOLUTION RESPIRATORY (INHALATION) at 17:33

## 2025-01-03 RX ADMIN — ALBUMIN (HUMAN) 125 MILLILITER(S): 12.5 INJECTION, SOLUTION INTRAVENOUS at 13:16

## 2025-01-03 RX ADMIN — Medication 50 MICROGRAM(S): at 06:15

## 2025-01-03 RX ADMIN — Medication 100 MILLIGRAM(S): at 13:30

## 2025-01-03 RX ADMIN — Medication 50 MILLIEQUIVALENT(S): at 01:40

## 2025-01-03 RX ADMIN — Medication 4 MILLILITER(S): at 05:06

## 2025-01-03 RX ADMIN — Medication 50 MILLIEQUIVALENT(S): at 06:44

## 2025-01-03 RX ADMIN — ROCURONIUM BROMIDE 10 MILLIGRAM(S): 10 INJECTION, SOLUTION INTRAVENOUS at 06:10

## 2025-01-03 RX ADMIN — Medication 25 GRAM(S): at 04:25

## 2025-01-03 RX ADMIN — CALCIUM CHLORIDE 1000 MILLIGRAM(S): 100 INJECTION, SOLUTION INTRAVENOUS at 01:56

## 2025-01-03 RX ADMIN — Medication 4 MILLILITER(S): at 17:33

## 2025-01-03 RX ADMIN — Medication 15 MILLILITER(S): at 06:48

## 2025-01-03 RX ADMIN — CLOPIDOGREL BISULFATE 75 MILLIGRAM(S): 75 TABLET, FILM COATED ORAL at 13:30

## 2025-01-03 RX ADMIN — Medication 50 MILLIEQUIVALENT(S): at 00:00

## 2025-01-03 RX ADMIN — Medication 75 MICROGRAM(S): at 11:28

## 2025-01-03 RX ADMIN — Medication 25 MILLILITER(S): at 06:48

## 2025-01-03 RX ADMIN — MEROPENEM 100 MILLIGRAM(S): 1 INJECTION INTRAVENOUS at 13:12

## 2025-01-03 RX ADMIN — VASOPRESSIN 4.5 UNIT(S)/MIN: 20 INJECTION INTRAVENOUS at 07:52

## 2025-01-03 RX ADMIN — LIDOCAINE HYDROCHLORIDE 2 PATCH: 20 JELLY TOPICAL at 00:01

## 2025-01-03 RX ADMIN — Medication 50 MILLIEQUIVALENT(S): at 03:18

## 2025-01-03 RX ADMIN — Medication 40 MILLIGRAM(S): at 11:29

## 2025-01-03 RX ADMIN — FILGRASTIM 300 MICROGRAM(S): 300 INJECTION, SOLUTION INTRAVENOUS; SUBCUTANEOUS at 11:49

## 2025-01-03 RX ADMIN — DOBUTAMINE 19.1 MICROGRAM(S)/KG/MIN: 250 INJECTION INTRAVENOUS at 07:51

## 2025-01-03 RX ADMIN — CALCIUM CHLORIDE 1000 MILLIGRAM(S): 100 INJECTION, SOLUTION INTRAVENOUS at 00:00

## 2025-01-03 RX ADMIN — Medication 100 MILLIGRAM(S): at 06:48

## 2025-01-03 RX ADMIN — IPRATROPIUM BROMIDE AND ALBUTEROL SULFATE 3 MILLILITER(S): .5; 2.5 SOLUTION RESPIRATORY (INHALATION) at 05:06

## 2025-01-03 RX ADMIN — Medication 12.5 MICROGRAM(S): at 04:30

## 2025-01-03 RX ADMIN — DEXTROSE MONOHYDRATE 30 MILLILITER(S): 100 INJECTION, SOLUTION INTRAVENOUS at 02:34

## 2025-01-03 RX ADMIN — Medication 2 MILLIGRAM(S): at 11:28

## 2025-01-03 RX ADMIN — Medication 12.5 MICROGRAM(S): at 21:40

## 2025-01-03 RX ADMIN — Medication 10 MILLIGRAM(S): at 21:06

## 2025-01-03 NOTE — PROGRESS NOTE ADULT - SUBJECTIVE AND OBJECTIVE BOX
[facility]    Dr. Nica Styles     Office [phone] (9 am to 5 pm)  Service: [phone] (5pm to 9am)  Also Available on TEAMS      RENAL PROGRESS NOTE: DATE OF SERVICE 01-03-25 @ 11:26    Patient is a 54y old  Male who presents with a chief complaint of CAD (03 Jan 2025 07:10)      Patient seen and examined at bedside. No chest pain/sob    VITALS:  T(F): 100.9 (01-03-25 @ 08:00), Max: 102.2 (01-02-25 @ 18:15)  HR: 80 (01-03-25 @ 11:00)  BP: 87/54 (01-03-25 @ 00:00)  RR: 18 (01-03-25 @ 11:00)  SpO2: 99% (01-03-25 @ 11:00)  Wt(kg): --    01-02 @ 07:01  -  01-03 @ 07:00  --------------------------------------------------------  IN: 2518.6 mL / OUT: 2670 mL / NET: -151.4 mL    01-03 @ 07:01  -  01-03 @ 11:26  --------------------------------------------------------  IN: 378.6 mL / OUT: 10 mL / NET: 368.6 mL          PHYSICAL EXAM:  Constitutional: NAD  Neck: No JVD  Respiratory: CTAB, no wheezes, rales or rhonchi  Cardiovascular: S1, S2, RRR  Gastrointestinal: BS+, soft, NT/ND  Extremities: No peripheral edema    Hospital Medications:   MEDICATIONS  (STANDING):  albuterol/ipratropium for Nebulization 3 milliLiter(s) Nebulizer every 6 hours  ascorbic acid 500 milliGRAM(s) Oral two times a day  aspirin enteric coated 81 milliGRAM(s) Oral daily  bisacodyl Suppository 10 milliGRAM(s) Rectal once  calcium acetate 667 milliGRAM(s) Oral three times a day with meals  chlorhexidine 0.12% Liquid 15 milliLiter(s) Oral Mucosa every 12 hours  chlorhexidine 2% Cloths 1 Application(s) Topical daily  clopidogrel Tablet 75 milliGRAM(s) Oral daily  dextrose 10%. 1000 milliLiter(s) (30 mL/Hr) IV Continuous <Continuous>  dextrose 50% Injectable 50 milliLiter(s) IV Push every 15 minutes  dextrose 50% Injectable 25 milliLiter(s) IV Push every 15 minutes  DOBUTamine Infusion 7.5 MICROgram(s)/kG/Min (19.1 mL/Hr) IV Continuous <Continuous>  EPINEPHrine    Infusion 0.04 MICROgram(s)/kG/Min (12.8 mL/Hr) IV Continuous <Continuous>  epoetin ignacia (EPOGEN) Injectable 17438 Unit(s) IV Push <User Schedule>  fentaNYL    Injectable 75 MICROGram(s) IV Push once  filgrastim-sndz (ZARXIO) Injectable 300 MICROGram(s) SubCutaneous once  gabapentin 100 milliGRAM(s) Oral every 8 hours  gentamicin   IVPB 250 milliGRAM(s) IV Intermittent once  hydrocortisone sodium succinate Injectable 100 milliGRAM(s) IV Push every 8 hours  insulin lispro (ADMELOG) corrective regimen sliding scale   SubCutaneous three times a day before meals  insulin lispro (ADMELOG) corrective regimen sliding scale   SubCutaneous at bedtime  lidocaine   4% Patch 2 Patch Transdermal daily  meropenem  IVPB 500 milliGRAM(s) IV Intermittent every 24 hours  methocarbamol 500 milliGRAM(s) Oral every 8 hours  midazolam Injectable 2 milliGRAM(s) IV Push once  Nephro-elicia 1 Tablet(s) Oral daily  norepinephrine Infusion 0.05 MICROgram(s)/kG/Min (7.98 mL/Hr) IV Continuous <Continuous>  pantoprazole  Injectable 40 milliGRAM(s) IV Push daily  polyethylene glycol 3350 17 Gram(s) Oral two times a day  senna 2 Tablet(s) Oral at bedtime  sodium bicarbonate  Injectable 50 milliEquivalent(s) IV Push once  sodium chloride 0.9%. 1000 milliLiter(s) (10 mL/Hr) IV Continuous <Continuous>  sodium chloride 3%  Inhalation 4 milliLiter(s) Inhalation every 12 hours  vasopressin Infusion 0.03 Unit(s)/Min (4.5 mL/Hr) IV Continuous <Continuous>      LABS:  01-03    137  |  94[L]  |  50[H]  ----------------------------<  138[H]  4.9   |  17[L]  |  6.32[H]    Ca    9.4      03 Jan 2025 09:25  Phos  6.4     01-03  Mg     1.8     01-03    TPro  5.6[L]  /  Alb  2.7[L]  /  TBili  1.5[H]  /  DBili      /  AST  1170[H]  /  ALT  927[H]  /  AlkPhos  76  01-03    Creatinine Trend: 6.32 <--, 6.21 <--, 5.03 <--, 4.67 <--, 3.71 <--, 3.36 <--, 5.24 <--, 6.71 <--, 8.86 <--, 7.20 <--    Albumin: 2.7 g/dL (01-03 @ 09:25)  Albumin: 3.3 g/dL (01-03 @ 00:43)  Phosphorus: 6.4 mg/dL (01-03 @ 00:43)                              10.1   4.71  )-----------( 123      ( 03 Jan 2025 09:25 )             31.1     Urine Studies:  Urinalysis - [01-03-25 @ 09:25]      Color  / Appearance  / SG  / pH       Gluc 138 / Ketone   / Bili  / Urobili        Blood  / Protein  / Leuk Est  / Nitrite       RBC  / WBC  / Hyaline  / Gran  / Sq Epi  / Non Sq Epi  / Bacteria       Iron 29, TIBC 143, %sat 20      [12-19-24 @ 05:00]  Ferritin 904      [12-19-24 @ 05:00]  TSH 4.75      [12-24-24 @ 06:50]    HBsAg Nonreact      [12-19-24 @ 05:00]      RADIOLOGY & ADDITIONAL STUDIES:

## 2025-01-03 NOTE — PROGRESS NOTE ADULT - SUBJECTIVE AND OBJECTIVE BOX
Brief history : 55yo with multiple medical problems including CAD s/p PCI in 2024    PMH: ESRD on HD, CAD s/p PCI , HTN, recurrent ascities requiring paracenthesis Q 4-6 wks, HLD s/p Acute darryl s/p cholecystectomy - admitted with acute SOB, Elevated trop (NSTEMI)  Echo showed newly depressed EF severe TR EF 35%   LHC ISR LAD/D1, LCx      CABG X 3  free LIMA-LAD; SVG-Diag; SVG-left PL  post ECHO EF 45 % on      weaned     24 hour events :  Hypotension  Dobutamine restarted   s/p 2 units of PRBC      ICU Vital Signs Last 24 Hrs  T(C): 38 (25 @ 04:00), Max: 39 (25 @ 18:15)  T(F): 100.4 (25 @ 04:00), Max: 102.2 (25 @ 18:15)  HR: 80 (25 @ 07:00) (67 - 91)  BP: 87/54 (25 @ 00:00) (87/54 - 145/67)  BP(mean): 65 (25 @ 00:00) (65 - 97)  ABP: 115/38 (25 @ 07:00) (-64/-68 - 179/41)  ABP(mean): 68 (25 @ 07:00) (-66 - 77)  RR: 23 (25 @ 06:30) (10 - 29)  SpO2: 100% (25 @ 07:00) (77% - 100%)    I&O's Summary    2025 07:01  -  2025 07:00  --------------------------------------------------------  IN: 2518.6 mL / OUT: 2160 mL / NET: 358.6 mL    Mode: AC/ CMV (Assist Control/ Continuous Mandatory Ventilation)  RR (machine): 24  TV (machine): 450  FiO2: 70  PEEP: 5  ITime: 1  MAP: 12  PIP: 35    ABG - ( 2025 06:47 )  pH: 7.37  /  pCO2: 36    /  pO2: 143   / HCO3: 21    / Base Excess: -4.0  /  SaO2: 99.8                          9.0    1.11  )-----------( 118      ( 2025 01:31 )             28.7     2025 00:43    137    |  95     |  52     ----------------------------<  87     4.1     |  18     |  6.21     Ca    9.0        2025 00:43  Phos  6.4       2025 00:43  Mg     1.8       2025 00:43    TPro  5.8    /  Alb  3.3    /  TBili  0.8    /  DBili  x      /  AST  588    /  ALT  588    /  AlkPhos  90     2025 00:43    PT/INR - ( 2025 00:43 )   PT: 24.8 sec;   INR: 2.19 ratio       PTT - ( 2025 00:43 )  PTT:35.6 sec    Culture - Sputum (collected 2025 17:42)  Source: .Sputum Sputum  Gram Stain (2025 06:44):    Numerous polymorphonuclear leukocytes per low power field    Few Squamous epithelial cells per low power field    Moderate Gram positive cocci in pairs seen per oil power field    Moderate Gram Negative Rods seen per oil power field      MEDICATIONS  (STANDING):  albuterol/ipratropium for Nebulization 3 milliLiter(s) Nebulizer every 6 hours  ascorbic acid 500 milliGRAM(s) Oral two times a day  aspirin enteric coated 81 milliGRAM(s) Oral daily  atorvastatin 80 milliGRAM(s) Oral daily  bisacodyl Suppository 10 milliGRAM(s) Rectal once  calcium acetate 667 milliGRAM(s) Oral three times a day with meals  chlorhexidine 0.12% Liquid 15 milliLiter(s) Oral Mucosa every 12 hours  chlorhexidine 2% Cloths 1 Application(s) Topical daily  clopidogrel Tablet 75 milliGRAM(s) Oral daily  dextrose 10%. 1000 milliLiter(s) IV Continuous <Continuous>  dextrose 50% Injectable 50 milliLiter(s) IV Push every 15 minutes  dextrose 50% Injectable 25 milliLiter(s) IV Push every 15 minutes  DOBUTamine Infusion 7.5 MICROgram(s)/kG/Min IV Continuous <Continuous>  epoetin ignacia (EPOGEN) Injectable 68616 Unit(s) IV Push <User Schedule>  gabapentin 100 milliGRAM(s) Oral every 8 hours  hydrocortisone sodium succinate Injectable 100 milliGRAM(s) IV Push every 8 hours  insulin lispro (ADMELOG) corrective regimen sliding scale   SubCutaneous three times a day before meals  insulin lispro (ADMELOG) corrective regimen sliding scale   SubCutaneous at bedtime  lidocaine   4% Patch 2 Patch Transdermal daily  lidocaine 2% Syringe 20 milliGRAM(s) IV Push every 5 minutes  meropenem  IVPB 500 milliGRAM(s) IV Intermittent every 24 hours  methocarbamol 500 milliGRAM(s) Oral every 8 hours  Nephro-elicia 1 Tablet(s) Oral daily  norepinephrine Infusion 0.05 MICROgram(s)/kG/Min IV Continuous <Continuous>  pantoprazole  Injectable 40 milliGRAM(s) IV Push daily  polyethylene glycol 3350 17 Gram(s) Oral two times a day  senna 2 Tablet(s) Oral at bedtime  sodium chloride 0.9%. 1000 milliLiter(s) IV Continuous <Continuous>  sodium chloride 3%  Inhalation 4 milliLiter(s) Inhalation every 12 hours  vasopressin Infusion 0.03 Unit(s)/Min IV Continuous <Continuous>    Home Medications:  aspirin 81 mg oral delayed release tablet: 1 tab(s) orally once a day (23 Dec 2024 16:58)  calcium acetate 667 mg oral tablet: 1 tab(s) orally 3 times a day (23 Dec 2024 16:58)  carvedilol 12.5 mg oral tablet: 1 tab(s) orally every 12 hours (23 Dec 2024 16:56)  clopidogrel 75 mg oral tablet: 1 tab(s) orally once a day (23 Dec 2024 16:56)  furosemide 20 mg oral tablet: 1 tab(s) orally once a day (23 Dec 2024 16:58)  hydrALAZINE 25 mg oral tablet: 1 tab(s) orally 3 times a day (23 Dec 2024 16:58)  lisinopril 40 mg oral tablet: 1 tab(s) orally once a day (23 Dec 2024 16:58)  lovastatin 10 mg oral tablet: 1 tab(s) orally once a day (23 Dec 2024 16:56)  NIFEdipine 90 mg oral tablet, extended release: 1 tab(s) orally once a day (23 Dec 2024 16:58)  Emani-Elicia oral tablet: 1 tab(s) orally once a day (23 Dec 2024 16:58)  traZODone 100 mg oral tablet: 1 tab(s) orally once a day (at bedtime) (23 Dec 2024 16:56)    PHYSICAL EXAM:  Gen : no acute distress  Neck: No LAD, No JVD  Respiratory: decreased in the bases  Cardiovascular: S1 and S2, RRR, no M/G/R  Gastrointestinal: BS+, soft, NT/ND  Extremities: No peripheral edema  LUE AVF   Cool to touch   Vascular: 2+ peripheral pulses  Neurological: A/O x 3, no focal deficits  Incision: clean dry/ no sign of infection  Lines: no sign of infection      S/p CABG X3   Acute post operative respiratory insufficiency  Acute blood loss anemia/Thrombocytopenia  Electrolyte abnormalities   Post operative pain   ESRD on HD    Plan   POD # 4 s/p CABG  X 3  in the setting of NSTEMI, LHC showing ISR of LAD/D1/LCx stent . pt with hypotension and ECHO showing Systolic HF/depressed BIV Function, currently supported with /pressors.  Labs this evening showing transaminitis    Neuro  Multimodal pain management    CVS  s/p CABG  Continue support with inotrope for concern of low flow state  Cardiogenic shock - labs suggestive of hepatic congestion  Inotropes -  increased to 7.5  Pressors - Levo 0.05, Vaso 0.03  Wean pressors as tolerated  Rhythm - NSR  Alex 20 ppm  Chest tube management  Vascular surgery consulted for fistula evaluation - may be contributing to heart failure.    Pulm  Stable on NC       ESRD on HD  Hyperkalemia - HD yesterday and Lokelma given once  Hyperphosphatemia - Phoslo    GI  NPO for now  Transaminitis - likely due to hepatic congestion in setting of low flow state   GI proph/Bowel regimen    ID  Empiric coverage with Meropenem    Endo  Glycemic control per protocol    Heme  Acute blood loss anemia  Transfused 2U PRBC in CTU  ASA / Statin  P2Y12        Critical care time spent    min       Care plan discussed with the ICU care team.   Patient remains critical, at risk for life threatening decompensation.    I have spent 30 minutes providing critical care management to this patient.    By signing my name below, I, Baldemar Hernandez, attest that this documentation has been prepared under the direction and in the presence of Herminio Mendez MD.   Electronically signed: Baldemar Mary, 25 @ 07:44    I, Herminio Mendez, personally performed the services described in this documentation. All medical record entries made by the scribe were at my direction and in my presence. I have reviewed the chart and agree that the record reflects my personal performance and is accurate and complete  Electronically signed: Herminio Mendez MD.  Brief history : 55yo with multiple medical problems including CAD s/p PCI in 2024    PMH: ESRD on HD, CAD s/p PCI 2024, HTN, recurrent ascites requiring paracentesis Q 4-6 wks, HLD   s/p Acute darryl s/p cholecystectomy - admitted with acute SOB, Elevated trop (NSTEMI)  Echo showed newly depressed EF severe TR EF 35%   LHC ISR LAD/D1, LCx      CABG X 3  free LIMA-LAD; SVG-Diag; SVG-left PL  post ECHO EF 45 % on      weaned    Shock/low flow -  restarted with improvement  1/2 Late in the day - hypoxia - left lung white out s/p awake bronch with removal of copious mucopurulent secretions  febrile T max 102 - late in evening  - respiratory failure worsening shock - intubated, labs showed acute leukopenia/lactic acidosis    IABP inserted, mod to high dose pressors   Blood cultures with GNR  bottles, resp culture gram stain with moderate GNR   severe sepsis/septic shock due to GNR/ECOLI       ICU Vital Signs Last 24 Hrs  T(C): 38 (25 @ 04:00), Max: 39 (25 @ 18:15)  T(F): 100.4 (25 @ 04:00), Max: 102.2 (25 @ 18:15)  HR: 80 (25 @ 07:00) (67 - 91)  BP: 87/54 (25 @ 00:00) (87/54 - 145/67)  BP(mean): 65 (25 @ 00:00) (65 - 97)  ABP: 115/38 (25 @ 07:00) (-64/-68 - 179/41)  ABP(mean): 68 (25 @ 07:00) (-66 - 77)  RR: 23 (25 @ 06:30) (10 - 29)  SpO2: 100% (25 @ 07:00) (77% - 100%)    I&O's Summary    2025 07:01  -  2025 07:00  --------------------------------------------------------  IN: 2518.6 mL / OUT: 2160 mL / NET: 358.6 mL    Mode: AC/ CMV (Assist Control/ Continuous Mandatory Ventilation)  RR (machine): 24  TV (machine): 450  FiO2: 70  PEEP: 5  ITime: 1  MAP: 12  PIP: 35    ABG - ( 2025 06:47 )  pH: 7.37  /  pCO2: 36    /  pO2: 143   / HCO3: 21    / Base Excess: -4.0  /  SaO2: 99.8                          9.0    1.11  )-----------( 118      ( 2025 01:31 )             28.7     2025 00:43    137    |  95     |  52     ----------------------------<  87     4.1     |  18     |  6.21     Ca    9.0        2025 00:43  Phos  6.4       2025 00:43  Mg     1.8       2025 00:43    TPro  5.8    /  Alb  3.3    /  TBili  0.8    /  DBili  x      /  AST  588    /  ALT  588    /  AlkPhos  90     2025 00:43    PT/INR - ( 2025 00:43 )   PT: 24.8 sec;   INR: 2.19 ratio     PTT - ( 2025 00:43 )  PTT:35.6 sec      Culture - Bronchial (collected 2025 05:51)  Source: Bronchial  Gram Stain (2025 14:40):    Numerous polymorphonuclear leukocytes seen per low power field    Numerous Gram Negative Rods seen per oil power field    Culture - Blood (collected 2025 20:45)  Source: .Blood BLOOD  Gram Stain (2025 08:12):    Growth in aerobic bottle: Gram Negative Rods    Growth in anaerobic bottle: Gram Negative Rods  Preliminary Report (2025 08:13):    Growth in aerobic bottle: Gram Negative Rods    Growth in anaerobic bottle: Gram Negative Rods    Culture - Blood (collected 2025 20:30)  Source: .Blood BLOOD  Gram Stain (2025 08:11):    Growth in aerobic bottle: Gram Negative Rods    Growth in anaerobic bottle: Gram Negative Rods  Preliminary Report (2025 08:12):    Growth in aerobic bottle: Gram Negative Rods    Growth in anaerobic bottle: Gram Negative Rods    Culture - Sputum (collected 2025 17:42)  Source: .Sputum Sputum  Gram Stain (2025 06:44):    Numerous polymorphonuclear leukocytes per low power field    Few Squamous epithelial cells per low power field    Moderate Gram positive cocci in pairs seen per oil power field    Moderate Gram Negative Rods seen per oil power field        MEDICATIONS  (STANDING):  albuterol/ipratropium for Nebulization 3 milliLiter(s) Nebulizer every 6 hours    ascorbic acid 500 milliGRAM(s) Oral two times a day  aspirin  chewable 81 milliGRAM(s) Oral daily  bisacodyl Suppository 10 milliGRAM(s) Rectal once  calcium acetate 667 milliGRAM(s) Oral three times a day with meals  clopidogrel Tablet 75 milliGRAM(s) Oral daily    norepinephrine Infusion 0.05 MICROgram(s)/kG/Min (7.98 mL/Hr) IV Continuous <Continuous>  vasopressin Infusion 0.03 Unit(s)/Min (4.5 mL/Hr) IV Continuous <Continuous>  DOBUTamine Infusion 7.5 MICROgram(s)/kG/Min (19.1 mL/Hr) IV Continuous <Continuous>  EPINEPHrine    Infusion 0.04 MICROgram(s)/kG/Min (12.8 mL/Hr) IV Continuous <Continuous>    calcium acetate 667 milliGRAM(s) Oral three times a day with meals  epoetin ignacia (EPOGEN) Injectable 66057 Unit(s) IV Push <User Schedule>  gabapentin 100 milliGRAM(s) Oral every 8 hours    hydrocortisone sodium succinate Injectable 100 milliGRAM(s) IV Push every 8 hours  insulin lispro (ADMELOG) corrective regimen sliding scale   SubCutaneous three times a day before meals  insulin lispro (ADMELOG) corrective regimen sliding scale   SubCutaneous at bedtime    meropenem  IVPB 1000 milliGRAM(s) IV Intermittent every 8 hours    methocarbamol 500 milliGRAM(s) Oral every 8 hours  pantoprazole  Injectable 40 milliGRAM(s) IV Push daily  polyethylene glycol 3350 17 Gram(s) Oral two times a day  senna 2 Tablet(s) Oral at bedtime      PHYSICAL EXAM:  Gen : intubated  Respiratory: decreased in the bases  Cardiovascular: S1 and S2, RRR, no M/G/R  Gastrointestinal: BS+, soft, NT/ND  Extremities: No peripheral edema  LUE AVF   Cool to touch   Vascular: 2+ peripheral pulses  IABP to left fem no hematoma, Right groin - no hematoma   Neurological: following commands  Incision: clean dry/ no sign of infection  Lines: no sign of infection      S/p CABG X3  POD #4    Severe sepsis/Septic shock - gram negative bacteremia likely source pneumonia  IABP management  Mixed shock - cardiogenic and septic  Shock liver  Lactic acidosis   Leukopenia in setting of severe sepsis     Plan   POD # 4 s/p CABG  X 3  in the setting of NSTEMI, LHC showing ISR of LAD/D1/LCx stent . now with severe sepsis, septic shock due to Gram negative bacteremia for Pneumonia - shock with multiorgan failure     Neuro  Multimodal pain management  Precedex as need for sedation   but following command     CVS  s/p CABG  Mixed shock - Sepsis/Septic shock with superimposed cardiogenic shock  ECHO with EF 25-30% BiV dysfunction   Inotropes -  increased to 7.5, Epi 0.04  Pressors - Levo 0.16, Vaso 0.1  IABP support   Rhythm - NSR  Alex 20 ppm  Chest tube management  Vascular surgery consulted for fistula evaluation - concern for aneurysmal Fistula     Pulm  Intubated on full vent support  Left lung whiteout 1/3 s/p bronch - repeat bronch today showed mild airway erythema - tan colored fluid removed for left lung  improved aeration post bronch        ESRD on HD - plan for CVVH for clearance today     GI  Trophic feeds for GI mucosa   Transaminitis - in setting of shock - monitor for now  Recent history of Cholecystectomy   s/p Diagnotic Paracentesis for concern of SBP  - fluid clear/serous - cultures sent     ID  Empiric coverage with Meropenem   Gent X 1 given  GNR bacteremia from Pneumonia     Endo  Hypoglycemia overnight on Dextrose infusion  - monitor closely     Heme  leukopenia  - s/p c-GSF  in setting of severe sepsis and bone marrow suppresssion   Acute blood loss anemia  Transfused 2U PRBC in CTU  ASA / Statin  P2Y12    Critical care time spent  95   min   Family updated at length over phone and in person       Care plan discussed with the ICU care team.   Patient remains critical, at risk for life threatening decompensation.    By signing my name below, I, Baldemar Hernandez, attest that this documentation has been prepared under the direction and in the presence of Herminio Mendez MD.   Electronically signed: Baldemar Mary, 25 @ 07:44    I, Herminio Mendez, personally performed the services described in this documentation. All medical record entries made by the scribe were at my direction and in my presence. I have reviewed the chart and agree that the record reflects my personal performance and is accurate and complete  Electronically signed: Herminio Mendez MD.  Brief history : 55yo with multiple medical problems including CAD s/p PCI in 2024    PMH: ESRD on HD, CAD s/p PCI 2024, HTN, recurrent ascites requiring paracentesis Q 4-6 wks, HLD   s/p Acute darryl s/p cholecystectomy - admitted with acute SOB, Elevated trop (NSTEMI)  Echo showed newly depressed EF severe TR EF 35%   LHC ISR LAD/D1, LCx      CABG X 3  free LIMA-LAD; SVG-Diag; SVG-left PL  post ECHO EF 45 % on      weaned    Shock/low flow -  restarted with improvement  1/2 Late in the day - hypoxia - left lung white out s/p awake bronch with removal of copious mucopurulent secretions  febrile T max 102 - late in evening  - respiratory failure worsening shock - intubated, labs showed acute leukopenia/lactic acidosis    IABP inserted, mod to high dose pressors   Blood cultures with GNR  bottles, resp culture gram stain with moderate GNR   severe sepsis/septic shock due to GNR/ECOLI       ICU Vital Signs Last 24 Hrs  T(C): 38 (25 @ 04:00), Max: 39 (25 @ 18:15)  T(F): 100.4 (25 @ 04:00), Max: 102.2 (25 @ 18:15)  HR: 80 (25 @ 07:00) (67 - 91)  BP: 87/54 (25 @ 00:00) (87/54 - 145/67)  BP(mean): 65 (25 @ 00:00) (65 - 97)  ABP: 115/38 (25 @ 07:00) (-64/-68 - 179/41)  ABP(mean): 68 (25 @ 07:00) (-66 - 77)  RR: 23 (25 @ 06:30) (10 - 29)  SpO2: 100% (25 @ 07:00) (77% - 100%)    I&O's Summary    2025 07:01  -  2025 07:00  --------------------------------------------------------  IN: 2518.6 mL / OUT: 2160 mL / NET: 358.6 mL    Mode: AC/ CMV (Assist Control/ Continuous Mandatory Ventilation)  RR (machine): 24  TV (machine): 450  FiO2: 70  PEEP: 5  ITime: 1  MAP: 12  PIP: 35    ABG - ( 2025 06:47 )  pH: 7.37  /  pCO2: 36    /  pO2: 143   / HCO3: 21    / Base Excess: -4.0  /  SaO2: 99.8                          9.0    1.11  )-----------( 118      ( 2025 01:31 )             28.7     2025 00:43    137    |  95     |  52     ----------------------------<  87     4.1     |  18     |  6.21     Ca    9.0        2025 00:43  Phos  6.4       2025 00:43  Mg     1.8       2025 00:43    TPro  5.8    /  Alb  3.3    /  TBili  0.8    /  DBili  x      /  AST  588    /  ALT  588    /  AlkPhos  90     2025 00:43    PT/INR - ( 2025 00:43 )   PT: 24.8 sec;   INR: 2.19 ratio     PTT - ( 2025 00:43 )  PTT:35.6 sec      Culture - Bronchial (collected 2025 05:51)  Source: Bronchial  Gram Stain (2025 14:40):    Numerous polymorphonuclear leukocytes seen per low power field    Numerous Gram Negative Rods seen per oil power field    Culture - Blood (collected 2025 20:45)  Source: .Blood BLOOD  Gram Stain (2025 08:12):    Growth in aerobic bottle: Gram Negative Rods    Growth in anaerobic bottle: Gram Negative Rods  Preliminary Report (2025 08:13):    Growth in aerobic bottle: Gram Negative Rods    Growth in anaerobic bottle: Gram Negative Rods    Culture - Blood (collected 2025 20:30)  Source: .Blood BLOOD  Gram Stain (2025 08:11):    Growth in aerobic bottle: Gram Negative Rods    Growth in anaerobic bottle: Gram Negative Rods  Preliminary Report (2025 08:12):    Growth in aerobic bottle: Gram Negative Rods    Growth in anaerobic bottle: Gram Negative Rods    Culture - Sputum (collected 2025 17:42)  Source: .Sputum Sputum  Gram Stain (2025 06:44):    Numerous polymorphonuclear leukocytes per low power field    Few Squamous epithelial cells per low power field    Moderate Gram positive cocci in pairs seen per oil power field    Moderate Gram Negative Rods seen per oil power field        MEDICATIONS  (STANDING):  albuterol/ipratropium for Nebulization 3 milliLiter(s) Nebulizer every 6 hours    ascorbic acid 500 milliGRAM(s) Oral two times a day  aspirin  chewable 81 milliGRAM(s) Oral daily  bisacodyl Suppository 10 milliGRAM(s) Rectal once  calcium acetate 667 milliGRAM(s) Oral three times a day with meals  clopidogrel Tablet 75 milliGRAM(s) Oral daily    norepinephrine Infusion 0.05 MICROgram(s)/kG/Min (7.98 mL/Hr) IV Continuous <Continuous>  vasopressin Infusion 0.03 Unit(s)/Min (4.5 mL/Hr) IV Continuous <Continuous>  DOBUTamine Infusion 7.5 MICROgram(s)/kG/Min (19.1 mL/Hr) IV Continuous <Continuous>  EPINEPHrine    Infusion 0.04 MICROgram(s)/kG/Min (12.8 mL/Hr) IV Continuous <Continuous>    calcium acetate 667 milliGRAM(s) Oral three times a day with meals  epoetin ignacia (EPOGEN) Injectable 13282 Unit(s) IV Push <User Schedule>  gabapentin 100 milliGRAM(s) Oral every 8 hours    hydrocortisone sodium succinate Injectable 100 milliGRAM(s) IV Push every 8 hours  insulin lispro (ADMELOG) corrective regimen sliding scale   SubCutaneous three times a day before meals  insulin lispro (ADMELOG) corrective regimen sliding scale   SubCutaneous at bedtime    meropenem  IVPB 1000 milliGRAM(s) IV Intermittent every 8 hours    methocarbamol 500 milliGRAM(s) Oral every 8 hours  pantoprazole  Injectable 40 milliGRAM(s) IV Push daily  polyethylene glycol 3350 17 Gram(s) Oral two times a day  senna 2 Tablet(s) Oral at bedtime      PHYSICAL EXAM:  Gen : intubated  Respiratory: decreased in the bases  Cardiovascular: S1 and S2, RRR, no M/G/R  Gastrointestinal: BS+, soft, NT/ND  Extremities: No peripheral edema  LUE AVF   Cool to touch   Vascular: 2+ peripheral pulses  IABP to left fem no hematoma, Right groin - no hematoma   Neurological: following commands  Incision: clean dry/ no sign of infection  Lines: no sign of infection      S/p CABG X3  POD #4    Severe sepsis/Septic shock - gram negative bacteremia likely source pneumonia  IABP management  Mixed shock - cardiogenic and septic  Shock liver  Lactic acidosis   Leukopenia in setting of severe sepsis   coagulopathy - in setting of shock liver/sepsis     Plan   POD # 4 s/p CABG  X 3  in the setting of NSTEMI, LHC showing ISR of LAD/D1/LCx stent . now with severe sepsis, septic shock due to Gram negative bacteremia for Pneumonia - shock with multiorgan failure     Neuro  Multimodal pain management  Precedex as need for sedation   but following command     CVS  s/p CABG  Mixed shock - Sepsis/Septic shock with superimposed cardiogenic shock  ECHO with EF 25-30% BiV dysfunction   Inotropes -  increased to 7.5, Epi 0.04  Pressors - Levo 0.16, Vaso 0.1  IABP support   Rhythm - NSR  Alex 20 ppm  Chest tube management  Vascular surgery consulted for fistula evaluation - concern for aneurysmal Fistula     Pulm  Intubated on full vent support  Left lung whiteout 1/3 s/p bronch - repeat bronch today showed mild airway erythema - tan colored fluid removed for left lung  improved aeration post bronch        ESRD on HD - plan for CVVH for clearance today     GI  Trophic feeds for GI mucosa   Transaminitis - in setting of shock - monitor for now  Recent history of Cholecystectomy   s/p Diagnotic Paracentesis for concern of SBP  - fluid clear/serous - cultures sent     ID  Empiric coverage with Meropenem   Gent X 1 given  GNR bacteremia from Pneumonia     Endo  Hypoglycemia overnight on Dextrose infusion  - monitor closely     Heme  leukopenia  - s/p c-GSF  in setting of severe sepsis and bone marrow suppresssion   Acute blood loss anemia  Transfused 2U PRBC in CTU  ASA / Statin  P2Y12 (consider holding Plavix if INR continues to rise)   coagulopathy in setting of septic shock and shock liver  - not actively bleeding   Hold anticoagulation for IABP       Critical care time spent  95   min   Family updated at length over phone and in person       Care plan discussed with the ICU care team.   Patient remains critical, at risk for life threatening decompensation.    By signing my name below, I, Baldemar Hernandez, attest that this documentation has been prepared under the direction and in the presence of Herminio Mendez MD.   Electronically signed: Baldemar Hernandez, 25 @ 07:44    I, Herminio Mendez, personally performed the services described in this documentation. All medical record entries made by the scribe were at my direction and in my presence. I have reviewed the chart and agree that the record reflects my personal performance and is accurate and complete  Electronically signed: Herminio Mendez MD.

## 2025-01-03 NOTE — PROCEDURE NOTE - ADDITIONAL PROCEDURE DETAILS
Pre-Bronchoscopy Tuberculosis Risk Screening Tool  To reduce the risk for airborne transmission of Mycobacterium tuberculosis, this assessment form must be completed prior to bronchoscopy procedures being performed outside of a negative pressure environment.    Procedure Date:  01-03-25 @ 13:08  Health Care Provider Name: Rona Hung PA-C  Form Completed By: Rona Hung PA-C  Reason for the Bronchoscopy: Secretions/atelectasis  Planned Location for the Procedure: X Intensive Care Unit       Risk Assessment  I. I have personally evaluated this patient for Mycobacterium tuberculosis and I determined the following risk:       X No Risk for TB  [ ]  Low risk or TB     [ ] Significant risk for TB    II. Additional Findings: None    III. Based on the Determined Risk for TB the following Action(s) are Suggested:  1. If there are no risk factors for TB infection proceed with the procedure.  2. If there is low risk or significant risk for TB infection the following recommendations should be followed:            a. Perform the procedure in a negative pressure room, with N95 respirator.            b. If not feasible to move the patient or defer the procedure:                    i. Use a single-bedded room low traffic area to perform the bronchoscopy procedure.                   ii. Place a portable high-efficiency particulate air (HEPA) filter in the space prior to starting the procedure and keep closed.                       Refer to the INF.1132 titled “Tuberculosis Control Strategy Plan” for additional information.                  iii. All Health Care Providers within the procedure room shall wear an N95 respirator.            c. Documentation of the tuberculosis risk assessment should be included within the patient’s medical record.      Indication: findings on CXR, L side    Findings:   Bronchoscope inserted through ETT. ETT noted to be in good position. Airway evaluation revealed Sharp Abena. AUGUSTO and LLL evaluation revealed mild-moderate amount of rust colored sputum. RUL, RML, RLL revealed very minimal secretions. Bronchoscope then withdrawn from ETT. No bleeding noted. Follow up CXR ordered.    Specimens: sent bronchial lavage

## 2025-01-03 NOTE — PROGRESS NOTE ADULT - SUBJECTIVE AND OBJECTIVE BOX
MR#[id number]  PATIENT NAME:RAAD CANO    DATE OF SERVICE: 01-03-25 @ 07:10  Patient was seen and examined by Solo Quick MD on    01-03-25 @ 07:10 .  Interim events noted.Consultant notes ,Labs,Telemetry reviewed by me       HOSPITAL COURSE: HPI:  54M with PMH of HTN, HLD, ESRD on HD MWF via LUE AVF, ascites (requiring repeat paracenteses q4-6wks), NICM with moderate LV dysfunction w/ EF 35-40%(2023) and CAD s/p atherectomy + SALLIE to D1(4/11/2024) presents to [facility] transferred from [facility]. Patient initially presented to [facility] ED with shortness of breath. Of note he was recently admitted from 09/27-12/02 for acute cholecystitis s/p cholecystectomy. In [facility] ED, pt was hypoxic to 86% on RA, trop 1.7K, EKG no new changes, CXR fluid overload. Admitted for AHRF 2/2 fluid overload. Pt received HD on 12/18, 12/19, 12/20 and 12/22. DAPT was resumed, TTE showed improvement in LVEF to 40-45%. Stress test was abnormal with 1mm inferior STD, reversible ischemia in the inferior wall and fixed defects in the lateral, anterior and apical walls with post stress EF of 24%. Pt was then transferred to [facility] for cardiac cath which showed pLAD 70% ISR, mLCx 70%, D1 severe dz. Patient now transferred to [facility] CTS Dr. Rivers for CABG eval. Patient denies any current SOB or chest pain on admission. Otherwise denies headaches, abdominal pain, urinary or bowel changes, fevers, chills, N/V/D, sick contacts.  (24 Dec 2024 05:07)      INTERIM EVENTS:Patient seen at bedside ,interim events noted.  12/23 Cardiac cath  pLAD 70% ISR, mLCx 70%, D1 severe dz.   12/ 25 VSS  CP free   HD via avf  for daily HD pre op- on lovenox 30 qd   12/26 HD today continue with present meds. Plan for CABG possibleTVR next week  12/27- VSS Sinus rhythm   12/28-Sinus rhythm plan for OR on Monday HD tomorrow  12/29-OOB Sinus rhythm no chest pain or dyspnea for HD with PRBC Tx today-CABG/TVR in AM -Dr Karan Rivers  12/30-Awake had episode bilious vomit after bedside PFT testing no abdominal pain For OR today Had HD with PRBC Tx  12/31-POD#1-C3L free LIMA-LAD; SVG-Diag; SVG-leftPL Awake OOB extubated Sinus rhythm on Dobutamine gtt  01/01-POD#2 OOB c/o pain on Nicardipine gtt Sinus Rhythm  01/03- Was intubated last night for airway protection s/p bronchoscopy This morning had IABP inserted  remains sedated intubated Sinus Rhythm      PMH -reviewed admission note, no change since admission  HEART FAILURE: Acute[x ]Chronic[ ] Systolic[x ] Diastolic[ ] Combined Systolic and Diastolic[ ]  CAD[ x] CABG[ ] PCI[x ]  DEVICES[ ] PPM[ ] ICD[ ] ILR[ ]  ATRIAL FIBRILLATION[ ] Paroxysmal[ ] Permanent[ ] CHADS2-[  ]  GHASSAN[ ] CKD1[ ] CKD2[ ] CKD3[ ] CKD4[ ] ESRD[x ]  COPD[ ] HTN[ ]   DM[ ] Type1[ ] Type 2[ ]   CVA[ ] Paresis[ ]    AMBULATION: Assisted[ ] Cane/walker[ ] Independent[x ]          MEDICATIONS  (STANDING):  albuterol/ipratropium for Nebulization 3 milliLiter(s) Nebulizer every 6 hours  ascorbic acid 500 milliGRAM(s) Oral two times a day  aspirin enteric coated 81 milliGRAM(s) Oral daily  atorvastatin 80 milliGRAM(s) Oral daily  bisacodyl Suppository 10 milliGRAM(s) Rectal once  calcium acetate 667 milliGRAM(s) Oral three times a day with meals  chlorhexidine 0.12% Liquid 15 milliLiter(s) Oral Mucosa every 12 hours  chlorhexidine 2% Cloths 1 Application(s) Topical daily  clopidogrel Tablet 75 milliGRAM(s) Oral daily  dextrose 10%. 1000 milliLiter(s) (30 mL/Hr) IV Continuous <Continuous>  dextrose 50% Injectable 50 milliLiter(s) IV Push once  dextrose 50% Injectable 50 milliLiter(s) IV Push every 15 minutes  dextrose 50% Injectable 25 milliLiter(s) IV Push every 15 minutes  DOBUTamine Infusion 7.5 MICROgram(s)/kG/Min (19.1 mL/Hr) IV Continuous <Continuous>  epoetin ignacia (EPOGEN) Injectable 09655 Unit(s) IV Push <User Schedule>  gabapentin 100 milliGRAM(s) Oral every 8 hours  hydrocortisone sodium succinate Injectable 100 milliGRAM(s) IV Push every 8 hours  insulin lispro (ADMELOG) corrective regimen sliding scale   SubCutaneous three times a day before meals  insulin lispro (ADMELOG) corrective regimen sliding scale   SubCutaneous at bedtime  lidocaine   4% Patch 2 Patch Transdermal daily  lidocaine 2% Syringe 20 milliGRAM(s) IV Push every 5 minutes  meropenem  IVPB 500 milliGRAM(s) IV Intermittent every 24 hours  methocarbamol 500 milliGRAM(s) Oral every 8 hours  Nephro-elicia 1 Tablet(s) Oral daily  norepinephrine Infusion 0.05 MICROgram(s)/kG/Min (7.98 mL/Hr) IV Continuous <Continuous>  pantoprazole  Injectable 40 milliGRAM(s) IV Push daily  polyethylene glycol 3350 17 Gram(s) Oral two times a day  senna 2 Tablet(s) Oral at bedtime  sodium chloride 0.9%. 1000 milliLiter(s) (10 mL/Hr) IV Continuous <Continuous>  sodium chloride 3%  Inhalation 4 milliLiter(s) Inhalation every 12 hours  vasopressin Infusion 0.03 Unit(s)/Min (4.5 mL/Hr) IV Continuous <Continuous>    MEDICATIONS  (PRN):  acetaminophen     Tablet .. 650 milliGRAM(s) Oral every 6 hours PRN Mild Pain (1 - 3)  fentaNYL    Injectable 12.5 MICROGram(s) IV Push every 2 hours PRN Moderate Pain (4 - 6)  fentaNYL    Injectable 25 MICROGram(s) IV Push every 3 hours PRN Severe Pain (7 - 10)  oxyCODONE    IR 5 milliGRAM(s) Oral every 4 hours PRN Moderate Pain (4 - 6)  oxyCODONE    IR 10 milliGRAM(s) Oral every 4 hours PRN Severe Pain (7 - 10)            REVIEW OF SYSTEMS:  Constitutional: [ ] fever, [ ]weight loss,  [ ]fatigue [ ]weight gain  Eyes: [ ] visual changes  Respiratory: [ ]shortness of breath;  [ ] cough, [ ]wheezing, [ ]chills, [ ]hemoptysis  Cardiovascular: [ ] chest pain, [ ]palpitations, [ ]dizziness,  [ ]leg swelling[ ]orthopnea[ ]PND  Gastrointestinal: [ ] abdominal pain, [ ]nausea, [ ]vomiting,  [ ]diarrhea [ ]Constipation [ ]Melena  Genitourinary: [ ] dysuria, [ ] hematuria [ ]Vigil  Neurologic: [ ] headaches [ ] tremors[ ]weakness [ ]Paralysis Right[ ] Left[ ]  Skin: [ ] itching, [ ]burning, [ ] rashes  Endocrine: [ ] heat or cold intolerance  Musculoskeletal: [ ] joint pain or swelling; [ ] muscle, back, or extremity pain  Psychiatric: [ ] depression, [ ]anxiety, [ ]mood swings, or [ ]difficulty sleeping  Hematologic: [ ] easy bruising, [ ] bleeding gums    [ ] All remaining systems negative except as per above.   [x ]Unable to obtain.  [x] No change in ROS since admission      Vital Signs Last 24 Hrs  T(C): 38 (03 Jan 2025 04:00), Max: 39 (02 Jan 2025 18:15)  T(F): 100.4 (03 Jan 2025 04:00), Max: 102.2 (02 Jan 2025 18:15)  HR: 80 (03 Jan 2025 07:00) (67 - 91)  BP: 87/54 (03 Jan 2025 00:00) (87/54 - 145/67)  BP(mean): 65 (03 Jan 2025 00:00) (65 - 97)  RR: 23 (03 Jan 2025 06:30) (10 - 29)  SpO2: 100% (03 Jan 2025 07:00) (77% - 100%)    Parameters below as of 03 Jan 2025 05:10  Patient On (Oxygen Delivery Method): ventilator      I&O's Summary    02 Jan 2025 07:01  -  03 Jan 2025 07:00  --------------------------------------------------------  IN: 2518.6 mL / OUT: 2160 mL / NET: 358.6 mL        PHYSICAL EXAM:  General: No acute distress BMI-28  HEENT: EOMI, PERRL  Neck: Supple, [ ] JVD  Lungs: Equal air entry bilaterally; [ ] rales [ ] wheezing [ ] rhonchi  Heart: Regular rate and rhythm; [x ] murmur   2/6 [ x] systolic [ ] diastolic [ ] radiation[ ] rubs [ ]  gallops  Abdomen: Nontender, bowel sounds present  Extremities: No clubbing, cyanosis, [ ] edema [ ]Pulses  equal and intact  Nervous system:  Sedated and intubated  Skin: No rashes or lesions    LABS:  01-03    137  |  95[L]  |  52[H]  ----------------------------<  87  4.1   |  18[L]  |  6.21[H]    Ca    9.0      03 Jan 2025 00:43  Phos  6.4     01-03  Mg     1.8     01-03    TPro  5.8[L]  /  Alb  3.3  /  TBili  0.8  /  DBili  x   /  AST  588[H]  /  ALT  588[H]  /  AlkPhos  90  01-03    Creatinine Trend: 6.21<--, 5.03<--, 4.67<--, 3.71<--, 3.36<--, 5.24<--                        9.0    1.11  )-----------( 118      ( 03 Jan 2025 01:31 )             28.7     PT/INR - ( 03 Jan 2025 00:43 )   PT: 24.8 sec;   INR: 2.19 ratio         PTT - ( 03 Jan 2025 00:43 )  PTT:35.6 sec      Blood Gas Arterial, Lactate: 6.4 <-- 8.9 <--8.1 <--5.3 <--4.0  mmol/L     TTE W or WO Ultrasound Enhancing Agent (01.03.25 @ 02:42) >  CONCLUSIONS:      1. Left ventricular systolic function is severely decreased.   2. Enlarged right ventricular cavity size and reduced right ventricular systolic function.   3. Left lung consolidation.      TTE Limited W or WO Ultrasound Enhancing Agent (01.01.25 @ 12:32) >  CONCLUSIONS:      1. Left ventricular cavity is normal in size. Left ventricular systolic function is severely decreased with an ejection fractionvisually estimated at 25 to 30 %.   2. Enlarged right ventricular cavity size and reduced right ventricular systolic function.   3. Mild to moderate tricuspid regurgitation.

## 2025-01-03 NOTE — CONSULT NOTE ADULT - SUBJECTIVE AND OBJECTIVE BOX
Patient is a 54y old  Male who presents with a chief complaint of CAD (03 Jan 2025 07:10)      HPI:  54M with PMH of HTN, HLD, ESRD on HD MWF via LUE AVG (2019), ascites (requiring repeat paracenteses q4-6wks), NICM with moderate LV dysfunction w/ EF 35-40%(2023) and CAD s/p atherectomy + SALLIE to D1(4/11/2024) presents to [facility] transferred from [facility]. Patient initially presented to [facility] ED with shortness of breath. Of note he was recently admitted from 09/27-12/02 for acute cholecystitis s/p cholecystectomy. In [facility] ED, pt was hypoxic to 86% on RA, trop 1.7K, EKG no new changes, CXR fluid overload. Admitted for AHRF 2/2 fluid overload. Now s/p CABG by Dr Rivers on 12/30 no C/w cardiogenic shock now on multiple pressors, intubated  and requiring CRRT. vascular surgery consulted for aneurysmal LUE fistula to r/o still Sd causing Right ventricular decompensation.       Vital Signs Last 24 Hrs  T(C): 37.8 (03 Jan 2025 16:00), Max: 38.4 (02 Jan 2025 20:00)  T(F): 100 (03 Jan 2025 16:00), Max: 101.2 (02 Jan 2025 20:00)  HR: 80 (03 Jan 2025 18:04) (69 - 89)  BP: 87/54 (03 Jan 2025 00:00) (87/54 - 145/67)  BP(mean): 65 (03 Jan 2025 00:00) (65 - 97)  RR: 19 (03 Jan 2025 18:00) (5 - 28)  SpO2: 100% (03 Jan 2025 18:04) (77% - 100%)    Parameters below as of 03 Jan 2025 18:04  Patient On (Oxygen Delivery Method): ventilator        PHYSICAL EXAM:  Constitutional: intubated, sedated  Respiratory: intubated. VAC in chets holding suction.   Cardiovascular: RRR on multiple pressors.   Gastrointestinal: abdomen soft, nondistended. No obvious masses. No peritonitis  Extremities:  -  LUE aneurysmal AVG, thrill pulsatile, +radial signal +ulnar signal, hand is warm to touch.       LABS:                        9.6    9.39  )-----------( 119      ( 03 Jan 2025 16:17 )             29.9     01-03    138  |  96  |  51[H]  ----------------------------<  144[H]  4.8   |  16[L]  |  5.93[H]    Ca    8.7      03 Jan 2025 16:17  Phos  6.7     01-03  Mg     2.1     01-03    TPro  5.7[L]  /  Alb  3.1[L]  /  TBili  1.7[H]  /  DBili  x   /  AST  1253[H]  /  ALT  1002[H]  /  AlkPhos  68  01-03    PT/INR - ( 03 Jan 2025 16:36 )   PT: 32.1 sec;   INR: 2.82 ratio         PTT - ( 03 Jan 2025 16:36 )  PTT:45.7 sec  Urinalysis Basic - ( 03 Jan 2025 16:17 )    Color: x / Appearance: x / SG: x / pH: x  Gluc: 144 mg/dL / Ketone: x  / Bili: x / Urobili: x   Blood: x / Protein: x / Nitrite: x   Leuk Esterase: x / RBC: x / WBC x   Sq Epi: x / Non Sq Epi: x / Bacteria: x        RADIOLOGY & ADDITIONAL STUDIES:

## 2025-01-03 NOTE — CONSULT NOTE ADULT - ASSESSMENT
54M with PMH of HTN, HLD, ESRD on HD MWF via LUE AVF, ascites (requiring repeat paracenteses q4-6wks), NICM with moderate LV dysfunction w/ EF 35-40%() and CAD s/p atherectomy + SALLIE to D1(2024) presents to [facility] transferred from [facility]. Patient initially presented to [facility] beds at the [facility] ED with shortness of breath. Of note he was recently admitted from - for acute cholecystitis s/p cholecystectomy. In [facility] beds at the [facility] ED, pt was hypoxic to 86% on RA, trop 1.7K, EKG no new changes, CXR fluid overload. Admitted for AHRF 2/2 fluid overload. Pt received HD on , ,  and . DAPT was resumed, TTE showed improvement in LVEF to 40-45%. Stress test was abnormal with 1mm inferior STD, reversible ischemia in the inferior wall and fixed defects in the lateral, anterior and apical walls with post stress EF of 24%. Pt was then transferred to [facility] for cardiac cath which showed pLAD 70% ISR, mLCx 70%, D1 severe dz. Patient now transferred to [facility] CTS Dr. Rivers for CABG eval. Patient denies any current SOB or chest pain on admission. Otherwise denies headaches, abdominal pain, urinary or bowel changes, fevers, chills, N/V/D, sick contacts.  (24 Dec 2024 05:07)   VSS  CP free   HD via avf  for daily HD pre op- on lovenox 30 qd    HD today continue with present meds. Plan for CABG possibleTVR next week   vss; rsr 55-80; hd today w/ 1 unit prbc; pt to be dialzyed also this  w/ another 1 unit prbc   VSS; RSR 60-80; continue asa/bb/ statin; HD in am - transfuse 1 unit prbc with HD; d/c lovenox after am dose sun    - Pt underwent  C3L free LIMA-LAD; SVG-Diag; SVG-leftPL  Pt given cefuroxime perioperatively   pt extubated   . pt with hypotension and ECHO showing Systolic HF/depressed BIV Function, currently supported with /pressors.  Labs this evening showing transaminitis  1/2 -3 pt hypotensive, febrile and reintubated  Pt pancultured. and started on zosyn- meropenem and gent  pt found to have Gram negative bacteremia  ID is asked to evaluate        A/P  #sepsis  ? source - many options. Pt with abnormal u/a but is a dialysis pt so hard to interpret. Pt with h//o SBP- pt with ascites  would do paracentesis - if possible- send for cell count, glucose, protein, and routine culture , AFB and fungal   could be from lungs but ? effusion vs infiltrate.  agree with meropenem  if going to change to CVVHD need to increase the meropenem dose    #elevated LFTs  likely shock liver  check hepatitis serology  trend  avoid hepatotoxic meds     #leukopenia  likely from sepsis  improved today      Marla Champion M.D. ,   please reach via teams   If no answer, or after 5PM/ weekends,  then please call  [phone]    Assessment and plan discussed with the primary team .    ID service will be covering over the weekend. Please call for acute issues or questions. [phone]

## 2025-01-03 NOTE — CONSULT NOTE ADULT - SUBJECTIVE AND OBJECTIVE BOX
Patient is a 54y old  Male who presents with a chief complaint of CAD (2025 07:10)      HPI:  54M with PMH of HTN, HLD, ESRD on HD MWF via LUE AVF, ascites (requiring repeat paracenteses q4-6wks), NICM with moderate LV dysfunction w/ EF 35-40%() and CAD s/p atherectomy + SALLIE to D1(2024) presents to [facility] transferred from [facility]. Patient initially presented to [facility] ED with shortness of breath. Of note he was recently admitted from - for acute cholecystitis s/p cholecystectomy. In [facility] ED, pt was hypoxic to 86% on RA, trop 1.7K, EKG no new changes, CXR fluid overload. Admitted for AHRF 2/2 fluid overload. Pt received HD on , ,  and . DAPT was resumed, TTE showed improvement in LVEF to 40-45%. Stress test was abnormal with 1mm inferior STD, reversible ischemia in the inferior wall and fixed defects in the lateral, anterior and apical walls with post stress EF of 24%. Pt was then transferred to [facility] for cardiac cath which showed pLAD 70% ISR, mLCx 70%, D1 severe dz. Patient now transferred to [facility] CTS Dr. Rivers for CABG eval. Patient denies any current SOB or chest pain on admission. Otherwise denies headaches, abdominal pain, urinary or bowel changes, fevers, chills, N/V/D, sick contacts.  (24 Dec 2024 05:07)   VSS  CP free   HD via avf  for daily HD pre op- on lovenox 30 qd    HD today continue with present meds. Plan for CABG possibleTVR next week   vss; rsr 55-80; hd today w/ 1 unit prbc; pt to be dialzyed also this  w/ another 1 unit prbc   VSS; RSR 60-80; continue asa/bb/ statin; HD in am - transfuse 1 unit prbc with HD; d/c lovenox after am dose sun    - Pt underwent  C3L free LIMA-LAD; SVG-Diag; SVG-leftPL  Pt given cefuroxime perioperatively   pt extubated   . pt with hypotension and ECHO showing Systolic HF/depressed BIV Function, currently supported with /pressors.  Labs this evening showing transaminitis  1/2 -3 pt hypotensive, febrile and reintubated  Pt pancultured. and started on zosyn- meropenem and gent  pt found to have Gram negative bacteremia  ID is asked to evaluate      PAST MEDICAL & SURGICAL HISTORY:  Type 2 diabetes mellitus      Hypertension      End stage renal disease  started HD 2019 T, Th, Sat via right chest permacath      Bone spur  right shoulder- hx of - sx done      Anemia      Injury of right wrist  hx of at age 14      History of hyperkalemia  before HD- K-6.2 - to repeat in am of sx, pt had HD today      S/P arthroscopy of right shoulder        History of vascular access device  right chest permacath - 2019      H/O right wrist surgery  tendons repair - at age 14      AV fistula      H/O ventral hernia repair      S/P cholecystectomy          Social history:   lives with family   retired , drove a taxi  former smoker  quit   social beer use in the past          FAMILY HISTORY:  FH: hypertension  mother, father- alive    FH: type 2 diabetes  mother, father- alive        REVIEW OF SYSTEMS    Allergies    No Known Allergies    Intolerances        Antimicrobials:       MEDICATIONS  (prior antimicrobials ):    cefuroxime  IVPB   100 mL/Hr IV Intermittent (25 @ 02:32)   100 mL/Hr IV Intermittent (24 @ 01:00)    gentamicin   IVPB   212.5 mL/Hr IV Intermittent (25 @ 11:49)    piperacillin/tazobactam IVPB.   200 mL/Hr IV Intermittent (25 @ 20:50)    piperacillin/tazobactam IVPB.-   25 mL/Hr IV Intermittent (25 @ 04:25)    vancomycin  IVPB   250 mL/Hr IV Intermittent (25 @ 21:42)             meropenem  IVPB 500 milliGRAM(s) IV Intermittent every 24 hours      MEDICATIONS  (STANDING):  albuterol/ipratropium for Nebulization 3 milliLiter(s) Nebulizer every 6 hours  ascorbic acid 500 milliGRAM(s) Oral two times a day  aspirin enteric coated 81 milliGRAM(s) Oral daily  bisacodyl Suppository 10 milliGRAM(s) Rectal once  calcium acetate 667 milliGRAM(s) Oral three times a day with meals  chlorhexidine 0.12% Liquid 15 milliLiter(s) Oral Mucosa every 12 hours  chlorhexidine 2% Cloths 1 Application(s) Topical daily  clopidogrel Tablet 75 milliGRAM(s) Oral daily  CRRT Treatment    <Continuous>  dextrose 10%. 1000 milliLiter(s) (30 mL/Hr) IV Continuous <Continuous>  dextrose 50% Injectable 50 milliLiter(s) IV Push every 15 minutes  dextrose 50% Injectable 25 milliLiter(s) IV Push every 15 minutes  DOBUTamine Infusion 7.5 MICROgram(s)/kG/Min (19.1 mL/Hr) IV Continuous <Continuous>  EPINEPHrine    Infusion 0.04 MICROgram(s)/kG/Min (12.8 mL/Hr) IV Continuous <Continuous>  epoetin ignacia (EPOGEN) Injectable 54770 Unit(s) IV Push <User Schedule>  gabapentin 100 milliGRAM(s) Oral every 8 hours  hydrocortisone sodium succinate Injectable 100 milliGRAM(s) IV Push every 8 hours  insulin lispro (ADMELOG) corrective regimen sliding scale   SubCutaneous three times a day before meals  insulin lispro (ADMELOG) corrective regimen sliding scale   SubCutaneous at bedtime  lidocaine   4% Patch 2 Patch Transdermal daily  meropenem  IVPB 500 milliGRAM(s) IV Intermittent every 24 hours  methocarbamol 500 milliGRAM(s) Oral every 8 hours  Nephro-elicia 1 Tablet(s) Oral daily  norepinephrine Infusion 0.05 MICROgram(s)/kG/Min (7.98 mL/Hr) IV Continuous <Continuous>  pantoprazole  Injectable 40 milliGRAM(s) IV Push daily  polyethylene glycol 3350 17 Gram(s) Oral two times a day  PrismaSATE Dialysate BGK 4 / 2.5 5000 milliLiter(s) (1000 mL/Hr) CRRT <Continuous>  PrismaSOL Filtration BGK 4 / 2.5 5000 milliLiter(s) (800 mL/Hr) CRRT <Continuous>  PrismaSOL Filtration BGK 4 / 2.5 5000 milliLiter(s) (200 mL/Hr) CRRT <Continuous>  senna 2 Tablet(s) Oral at bedtime  sodium chloride 0.9%. 1000 milliLiter(s) (10 mL/Hr) IV Continuous <Continuous>  sodium chloride 3%  Inhalation 4 milliLiter(s) Inhalation every 12 hours  vasopressin Infusion 0.03 Unit(s)/Min (4.5 mL/Hr) IV Continuous <Continuous>    MEDICATIONS  (PRN):  acetaminophen     Tablet .. 650 milliGRAM(s) Oral every 6 hours PRN Mild Pain (1 - 3)  fentaNYL    Injectable 12.5 MICROGram(s) IV Push every 2 hours PRN Moderate Pain (4 - 6)  fentaNYL    Injectable 25 MICROGram(s) IV Push every 3 hours PRN Severe Pain (7 - 10)  oxyCODONE    IR 5 milliGRAM(s) Oral every 4 hours PRN Moderate Pain (4 - 6)  oxyCODONE    IR 10 milliGRAM(s) Oral every 4 hours PRN Severe Pain (7 - 10)        Vital Signs Last 24 Hrs  T(C): 38.4 (2025 12:00), Max: 39 (2025 18:15)  T(F): 101.1 (2025 12:00), Max: 102.2 (2025 18:15)  HR: 80 (2025 12:00) (69 - 91)  BP: 87/54 (2025 00:00) (87/54 - 145/67)  BP(mean): 65 (2025 00:00) (65 - 97)  RR: 18 (2025 12:00) (5 - 29)  SpO2: 100% (2025 12:00) (77% - 100%)    Parameters below as of 2025 12:00  Patient On (Oxygen Delivery Method): ventilator    O2 Concentration (%): 60    PHYSICAL EXAM:Pleasant patient in no acute distress.      Constitutional:Comfortable.Awake and alert  No cachexia     Eyes:PERRL EOMI.NO discharge or conjunctival injection    ENMT:No sinus tenderness.No thrush.No pharyngeal exudate or erythema.Fair dental hygiene    Neck:Supple,No LN,no JVD      Respiratory:Good air entry bilaterally,CTA    Cardiovascular:S1 S2 wnl, No murmurs,rub or gallops    Gastrointestinal:Soft BS(+) no tenderness no masses ,No rebound or guarding    Genitourinary:No CVA tendereness     Rectal:    Extremities:No cyanosis,clubbing or edema.    Vascular:peripheral pulses felt    Neurological:AAO X 3,No grossly focal deficits    Skin:No rash     Lymph Nodes:No palpable LNs    Musculoskeletal:No joint swelling or LOM    Psychiatric:Affect normal.                                10.1   4.71  )-----------( 123      ( 2025 09:25 )             31.1     LIVER FUNCTIONS - ( 2025 09:25 )  Alb: 2.7 g/dL / Pro: 5.6 g/dL / ALK PHOS: 76 U/L / ALT: 927 U/L / AST: 1170 U/L / GGT: x                 137  |  94[L]  |  50[H]  ----------------------------<  138[H]  4.9   |  17[L]  |  6.32[H]    Ca    9.4      2025 09:25  Phos  6.4       Mg     1.8         TPro  5.6[L]  /  Alb  2.7[L]  /  TBili  1.5[H]  /  DBili  x   /  AST  1170[H]  /  ALT  927[H]  /  AlkPhos  76          Urinalysis (25 @ 02:59)    pH Urine: 6.5   Glucose Qualitative, Urine: Negative mg/dL   Blood, Urine: Large   Color: Dark Yellow   Urine Appearance: Turbid   Bilirubin: Negative   Ketone - Urine: Trace mg/dL   Specific Gravity: 1.019   Protein, Urine: 300 mg/dL   Urobilinogen: 0.2 mg/dL   Nitrite: Negative   Leukocyte Esterase Concentration: Moderate  Urine Microscopic-Add On (NC) (25 @ 02:59)    Sperm-like Cells: Present   Red Blood Cell - Urine: 294 /HPF   White Blood Cell - Urine: 238 /HPF   Bacteria: Few /HPF   Epithelial Cells: 17 /HPF   Review: Reviewed   Cast: 31 /LPF      Culture - Blood (25 @ 20:45)    Gram Stain:   Growth in aerobic bottle: Gram Negative Rods  Growth in anaerobic bottle: Gram Negative Rods   Specimen Source: .Blood BLOOD   Culture Results:   Growth in aerobic bottle: Gram Negative Rods  Growth in anaerobic bottle: Gram Negative Rods      Culture - Blood (25 @ 20:30)    Gram Stain:   Growth in aerobic bottle: Gram Negative Rods  Growth in anaerobic bottle: Gram Negative Rods   -  ESBL: Detec   -  Escherichia coli: Detec   -  CTX-M Resistance Marker: Detec   Specimen Source: .Blood BLOOD   Organism: Blood Culture PCR   Culture Results:   Growth in aerobic bottle: Gram Negative Rods  Growth in anaerobic bottle: Gram Negative Rods  Direct identification is available within approximately 3-5  hours either by Blood Panel Multiplexed PCR or Direct  MALDI-TOF. Details: [email or web address]   Organism Identification: Blood Culture PCR   Method Type: PCR      Culture - Sputum . (25 @ 17:42)    Gram Stain:   Numerous polymorphonuclear leukocytes per low power field  Few Squamous epithelial cells per low power field  Moderate Gram positive cocci in pairs seen per oil power field  Moderate Gram Negative Rods seen per oil power field   Specimen Source: .Sputum Sputum        MRSA/MSSA PCR (24 @ 07:15)    MRSA PCR Result.: NotDetec: The results of this test should be interpreted with consideration of  clinical context.  Not Detected result indicates the absence of organisms or that the number  of organisms is below the assay limit of detection.  Detected result indicates the presence of organism nucleic acid.  Indeterminate result may indicate the presence of amplification  inhibitors in specimen; presence or absence of organisms cannot be  determined. Consider collecting new specimen if further testing is still  needed.  This qualitative PCR assay is FDA-approved, and its performance was  established by [facility] [employer], [place], [place].   Staph aureus PCR Result: Detected        < from: US Abdomen Complete (US Abdomen Complete .) (25 @ 07:23) >  ACC: [id number] EXAM:  US ABDOMEN COMPLETE   ORDERED BY:  CHAPITO LÓPEZ     PROCEDURE DATE:  2025          INTERPRETATION:  CLINICAL INFORMATION: Paracentesis.    COMPARISON: 2024    TECHNIQUE: Sonography of the abdomen.    FINDINGS:  Liver: Enlarged measuring 18.5 cm.  Bile ducts: Normal caliber. Common bile duct measures 2.5 mm.  Gallbladder: Contracted without shadowing gallstone.  Pancreas: Visualized portions are within normal limits.  Spleen: 12.3 cm. Echogenic foci noted.  Rightkidney: 8.6 cm. No hydronephrosis. Atrophic and echogenic  Left kidney: Not visualized.  Ascites: Mild/moderate free fluid largest pocket noted in the right lower   quadrant.  Aorta and IVC: Visualized portions are within normal limits.  Bladder: Right pleural effusion.    IMPRESSION:  Mild/moderate free fluid largest pocket noted in the right lower quadrant.  Enlarged liver.  Small echogenic right kidney.  Right pleural effusion.  Left kidney not well-visualized.      < end of copied text >    < from: Xray Chest 1 View- PORTABLE-Urgent (Xray Chest 1 View- PORTABLE-Urgent .) (25 @ 07:11) >  IMPRESSION:  Lines, devices, and tubes as above.  Moderate left pleural effusion with associated atelectasis.  Right lung is clear    < end of copied text >    < from: NM Hepatobiliary Imaging (24 @ 20:07) >    IMPRESSION: Abnormal hepatobiliary scan.    Findings compatible with biliary obstruction.    Underlying acute cholcystitis is not excluded.    Report was discussed with Dr. Hines on 2024, at 8:30 PM, by telephone   with read back.    --- End of Report ---      < end of copied text >       Patient is a 54y old  Male who presents with a chief complaint of CAD (2025 07:10)      HPI:  54M with PMH of HTN, HLD, ESRD on HD MWF via LUE AVF, ascites (requiring repeat paracenteses q4-6wks), NICM with moderate LV dysfunction w/ EF 35-40%() and CAD s/p atherectomy + SALLIE to D1(2024) presents to [facility] transferred from [facility]. Patient initially presented to [facility] ED with shortness of breath. Of note he was recently admitted from - for acute cholecystitis s/p cholecystectomy. In [facility] ED, pt was hypoxic to 86% on RA, trop 1.7K, EKG no new changes, CXR fluid overload. Admitted for AHRF 2/2 fluid overload. Pt received HD on , ,  and . DAPT was resumed, TTE showed improvement in LVEF to 40-45%. Stress test was abnormal with 1mm inferior STD, reversible ischemia in the inferior wall and fixed defects in the lateral, anterior and apical walls with post stress EF of 24%. Pt was then transferred to [facility] for cardiac cath which showed pLAD 70% ISR, mLCx 70%, D1 severe dz. Patient now transferred to [facility] CTS Dr. Rivers for CABG eval. Patient denies any current SOB or chest pain on admission. Otherwise denies headaches, abdominal pain, urinary or bowel changes, fevers, chills, N/V/D, sick contacts.  (24 Dec 2024 05:07)   VSS  CP free   HD via avf  for daily HD pre op- on lovenox 30 qd    HD today continue with present meds. Plan for CABG possibleTVR next week   vss; rsr 55-80; hd today w/ 1 unit prbc; pt to be dialzyed also this  w/ another 1 unit prbc   VSS; RSR 60-80; continue asa/bb/ statin; HD in am - transfuse 1 unit prbc with HD; d/c lovenox after am dose sun    - Pt underwent  C3L free LIMA-LAD; SVG-Diag; SVG-leftPL  Pt given cefuroxime perioperatively   pt extubated   . pt with hypotension and ECHO showing Systolic HF/depressed BIV Function, currently supported with /pressors.  Labs this evening showing transaminitis  1/2 -3 pt hypotensive, febrile and reintubated  Pt pancultured. and started on zosyn- meropenem and gent  pt found to have Gram negative bacteremia  ID is asked to evaluate      PAST MEDICAL & SURGICAL HISTORY:  Type 2 diabetes mellitus      Hypertension      End stage renal disease  started HD 2019 T, Th, Sat via right chest permacath      Bone spur  right shoulder- hx of - sx done      Anemia      Injury of right wrist  hx of at age 14      History of hyperkalemia  before HD- K-6.2 - to repeat in am of sx, pt had HD today      S/P arthroscopy of right shoulder        History of vascular access device  right chest permacath - 2019      H/O right wrist surgery  tendons repair - at age 14      AV fistula      H/O ventral hernia repair      S/P cholecystectomy          Social history:   lives with family   retired , drove a taxi  former smoker  quit   social beer use in the past          FAMILY HISTORY:  FH: hypertension  mother, father- alive    FH: type 2 diabetes  mother, father- alive        REVIEW OF SYSTEMS  Pt unable to provide ROS  discolored sputum- thin s/p bronchscopy yesterday and today  s/p ? mucus plug yesterday  pt for CVVHD today     Allergies    No Known Allergies    Intolerances        Antimicrobials:       MEDICATIONS  (prior antimicrobials ):    cefuroxime  IVPB   100 mL/Hr IV Intermittent (25 @ 02:32)   100 mL/Hr IV Intermittent (24 @ 01:00)    gentamicin   IVPB   212.5 mL/Hr IV Intermittent (25 @ 11:49)    piperacillin/tazobactam IVPB.   200 mL/Hr IV Intermittent (25 @ 20:50)    piperacillin/tazobactam IVPB.-   25 mL/Hr IV Intermittent (25 @ 04:25)    vancomycin  IVPB   250 mL/Hr IV Intermittent (25 @ 21:42)             meropenem  IVPB 500 milliGRAM(s) IV Intermittent every 24 hours      MEDICATIONS  (STANDING):  albuterol/ipratropium for Nebulization 3 milliLiter(s) Nebulizer every 6 hours  ascorbic acid 500 milliGRAM(s) Oral two times a day  aspirin enteric coated 81 milliGRAM(s) Oral daily  bisacodyl Suppository 10 milliGRAM(s) Rectal once  calcium acetate 667 milliGRAM(s) Oral three times a day with meals  chlorhexidine 0.12% Liquid 15 milliLiter(s) Oral Mucosa every 12 hours  chlorhexidine 2% Cloths 1 Application(s) Topical daily  clopidogrel Tablet 75 milliGRAM(s) Oral daily  CRRT Treatment    <Continuous>  dextrose 10%. 1000 milliLiter(s) (30 mL/Hr) IV Continuous <Continuous>  dextrose 50% Injectable 50 milliLiter(s) IV Push every 15 minutes  dextrose 50% Injectable 25 milliLiter(s) IV Push every 15 minutes  DOBUTamine Infusion 7.5 MICROgram(s)/kG/Min (19.1 mL/Hr) IV Continuous <Continuous>  EPINEPHrine    Infusion 0.04 MICROgram(s)/kG/Min (12.8 mL/Hr) IV Continuous <Continuous>  epoetin ignacia (EPOGEN) Injectable 02660 Unit(s) IV Push <User Schedule>  gabapentin 100 milliGRAM(s) Oral every 8 hours  hydrocortisone sodium succinate Injectable 100 milliGRAM(s) IV Push every 8 hours  insulin lispro (ADMELOG) corrective regimen sliding scale   SubCutaneous three times a day before meals  insulin lispro (ADMELOG) corrective regimen sliding scale   SubCutaneous at bedtime  lidocaine   4% Patch 2 Patch Transdermal daily  meropenem  IVPB 500 milliGRAM(s) IV Intermittent every 24 hours  methocarbamol 500 milliGRAM(s) Oral every 8 hours  Nephro-elicia 1 Tablet(s) Oral daily  norepinephrine Infusion 0.05 MICROgram(s)/kG/Min (7.98 mL/Hr) IV Continuous <Continuous>  pantoprazole  Injectable 40 milliGRAM(s) IV Push daily  polyethylene glycol 3350 17 Gram(s) Oral two times a day  PrismaSATE Dialysate BGK 4 / 2.5 5000 milliLiter(s) (1000 mL/Hr) CRRT <Continuous>  PrismaSOL Filtration BGK 4 / 2.5 5000 milliLiter(s) (800 mL/Hr) CRRT <Continuous>  PrismaSOL Filtration BGK 4 / 2.5 5000 milliLiter(s) (200 mL/Hr) CRRT <Continuous>  senna 2 Tablet(s) Oral at bedtime  sodium chloride 0.9%. 1000 milliLiter(s) (10 mL/Hr) IV Continuous <Continuous>  sodium chloride 3%  Inhalation 4 milliLiter(s) Inhalation every 12 hours  vasopressin Infusion 0.03 Unit(s)/Min (4.5 mL/Hr) IV Continuous <Continuous>    MEDICATIONS  (PRN):  acetaminophen     Tablet .. 650 milliGRAM(s) Oral every 6 hours PRN Mild Pain (1 - 3)  fentaNYL    Injectable 12.5 MICROGram(s) IV Push every 2 hours PRN Moderate Pain (4 - 6)  fentaNYL    Injectable 25 MICROGram(s) IV Push every 3 hours PRN Severe Pain (7 - 10)  oxyCODONE    IR 5 milliGRAM(s) Oral every 4 hours PRN Moderate Pain (4 - 6)  oxyCODONE    IR 10 milliGRAM(s) Oral every 4 hours PRN Severe Pain (7 - 10)        Vital Signs Last 24 Hrs  T(C): 38.4 (2025 12:00), Max: 39 (2025 18:15)  T(F): 101.1 (2025 12:00), Max: 102.2 (2025 18:15)  HR: 80 (2025 12:00) (69 - 91)  BP: 87/54 (2025 00:00) (87/54 - 145/67)  BP(mean): 65 (2025 00:00) (65 - 97)  RR: 18 (2025 12:00) (5 - 29)  SpO2: 100% (2025 12:00) (77% - 100%)    Parameters below as of 2025 12:00  Patient On (Oxygen Delivery Method): ventilator    O2 Concentration (%): 60    PHYSICAL EXAM:  intubated      Constitutional:Comfortable. sedated    Eyes:PERRL EOMI.NO discharge or conjunctival injection    Neck:Supple,No LN,no JVD intubated      Respiratory:Good air entry bilaterally,CTA    Cardiovascular:S1 S2  sounds of the IABP    Gastrointestinal: softly distended     Genitourinary:No CVA tendereness     Extremities:No cyanosis,clubbing or edema.    Neurological: sedated    Skin:No rash     Lymph Nodes:No palpable LNs    Musculoskeletal:No joint swelling or LOM                            10.1   4.71  )-----------( 123      ( 2025 09:25 )             31.1     LIVER FUNCTIONS - ( 2025 09:25 )  Alb: 2.7 g/dL / Pro: 5.6 g/dL / ALK PHOS: 76 U/L / ALT: 927 U/L / AST: 1170 U/L / GGT: x           Complete Blood Count + Automated Diff (25 @ 09:25)    WBC Count: 4.71 K/uL   RBC Count: 3.42 M/uL   Hemoglobin: 10.1 g/dL   Hematocrit: 31.1 %   Mean Cell Volume: 90.9 fl   Mean Cell Hemoglobin: 29.5 pg   Mean Cell Hemoglobin Conc: 32.5 g/dL   Red Cell Distrib Width: 18.8 %   Platelet Count - Automated: 123 K/uL   Auto Neutrophil #: 4.05 K/uL   Auto Lymphocyte #: 0.27 K/uL   Auto Monocyte #: 0.30 K/uL   Auto Eosinophil #: 0.03 K/uL   Auto Basophil #: 0.02 K/uL   Auto Neutrophil %: 86.1: Manual Differential performed within last 36 hours.  See prior Manual  Differential. Note Automated Differential does not count Blasts, Bands,  Reactive Lymphocytes, or Other Lymphocytes. Manual Differential performed  within last 36 hours.  See prior Manual Differential. Note Automated  Differential does not count Blasts, Bands, Reactive Lymphocytes, or Other  Lymphocytes.  Differential percentages must be correlated with absolute numbers for  clinical significance. %   Auto Lymphocyte %: 5.7 %   Auto Monocyte %: 6.4 %   Auto Eosinophil %: 0.6 %   Auto Basophil %: 0.4 %   Auto Immature Granulocyte %: 0.8: (Includes meta, myelo and promyelocytes). Mild elevations in immature  granulocytes may be seen with many inflammatory processes and pregnancy;  clinical correlation suggested. %   Nucleated RBC: 0 /100 WBCs    Manual Differential (25 @ 00:43)    Band Neutrophils %: 23.3 %      -    137  |  94[L]  |  50[H]  ----------------------------<  138[H]  4.9   |  17[L]  |  6.32[H]    Ca    9.4      2025 09:25  Phos  6.4     01-  Mg     1.8     -    TPro  5.6[L]  /  Alb  2.7[L]  /  TBili  1.5[H]  /  DBili  x   /  AST  1170[H]  /  ALT  927[H]  /  AlkPhos  76          Urinalysis (25 @ 02:59)    pH Urine: 6.5   Glucose Qualitative, Urine: Negative mg/dL   Blood, Urine: Large   Color: Dark Yellow   Urine Appearance: Turbid   Bilirubin: Negative   Ketone - Urine: Trace mg/dL   Specific Gravity: 1.019   Protein, Urine: 300 mg/dL   Urobilinogen: 0.2 mg/dL   Nitrite: Negative   Leukocyte Esterase Concentration: Moderate  Urine Microscopic-Add On (NC) (25 @ 02:59)    Sperm-like Cells: Present   Red Blood Cell - Urine: 294 /HPF   White Blood Cell - Urine: 238 /HPF   Bacteria: Few /HPF   Epithelial Cells: 17 /HPF   Review: Reviewed   Cast: 31 /LPF      Culture - Blood (25 @ 20:45)    Gram Stain:   Growth in aerobic bottle: Gram Negative Rods  Growth in anaerobic bottle: Gram Negative Rods   Specimen Source: .Blood BLOOD   Culture Results:   Growth in aerobic bottle: Gram Negative Rods  Growth in anaerobic bottle: Gram Negative Rods      Culture - Blood (25 @ 20:30)    Gram Stain:   Growth in aerobic bottle: Gram Negative Rods  Growth in anaerobic bottle: Gram Negative Rods   -  ESBL: Detec   -  Escherichia coli: Detec   -  CTX-M Resistance Marker: Detec   Specimen Source: .Blood BLOOD   Organism: Blood Culture PCR   Culture Results:   Growth in aerobic bottle: Gram Negative Rods  Growth in anaerobic bottle: Gram Negative Rods  Direct identification is available within approximately 3-5  hours either by Blood Panel Multiplexed PCR or Direct  MALDI-TOF. Details: [email or web address]   Organism Identification: Blood Culture PCR   Method Type: PCR      Culture - Sputum . (25 @ 17:42)    Gram Stain:   Numerous polymorphonuclear leukocytes per low power field  Few Squamous epithelial cells per low power field  Moderate Gram positive cocci in pairs seen per oil power field  Moderate Gram Negative Rods seen per oil power field   Specimen Source: .Sputum Sputum        MRSA/MSSA PCR (24 @ 07:15)    MRSA PCR Result.: NotDetec: The results of this test should be interpreted with consideration of  clinical context.  Not Detected result indicates the absence of organisms or that the number  of organisms is below the assay limit of detection.  Detected result indicates the presence of organism nucleic acid.  Indeterminate result may indicate the presence of amplification  inhibitors in specimen; presence or absence of organisms cannot be  determined. Consider collecting new specimen if further testing is still  needed.  This qualitative PCR assay is FDA-approved, and its performance was  established by [facility] [employer], [place], [place].   Staph aureus PCR Result: Detected        < from: US Abdomen Complete (US Abdomen Complete .) (25 @ 07:23) >  ACC: [id number] EXAM:  US ABDOMEN COMPLETE   ORDERED BY:  CHAPITO LÓPEZ     PROCEDURE DATE:  2025          INTERPRETATION:  CLINICAL INFORMATION: Paracentesis.    COMPARISON: 2024    TECHNIQUE: Sonography of the abdomen.    FINDINGS:  Liver: Enlarged measuring 18.5 cm.  Bile ducts: Normal caliber. Common bile duct measures 2.5 mm.  Gallbladder: Contracted without shadowing gallstone.  Pancreas: Visualized portions are within normal limits.  Spleen: 12.3 cm. Echogenic foci noted.  Rightkidney: 8.6 cm. No hydronephrosis. Atrophic and echogenic  Left kidney: Not visualized.  Ascites: Mild/moderate free fluid largest pocket noted in the right lower   quadrant.  Aorta and IVC: Visualized portions are within normal limits.  Bladder: Right pleural effusion.    IMPRESSION:  Mild/moderate free fluid largest pocket noted in the right lower quadrant.  Enlarged liver.  Small echogenic right kidney.  Right pleural effusion.  Left kidney not well-visualized.      < end of copied text >    < from: Xray Chest 1 View- PORTABLE-Urgent (Xray Chest 1 View- PORTABLE-Urgent .) (25 @ 07:11) >  IMPRESSION:  Lines, devices, and tubes as above.  Moderate left pleural effusion with associated atelectasis.  Right lung is clear    < end of copied text >    < from: NM Hepatobiliary Imaging (24 @ 20:07) >    IMPRESSION: Abnormal hepatobiliary scan.    Findings compatible with biliary obstruction.    Underlying acute cholcystitis is not excluded.    Report was discussed with Dr. Hines on 2024, at 8:30 PM, by telephone   with read back.    --- End of Report ---      < end of copied text >    < from: TTE W or WO Ultrasound Enhancing Agent (25 @ 02:42) >  CONCLUSIONS:      1. Left ventricular systolic function is severely decreased.   2. Enlarged right ventricular cavity size and reduced right ventricular systolic function.   3. Left lung consolidation.    ____________________________________    < end of copied text >

## 2025-01-03 NOTE — PROGRESS NOTE ADULT - ASSESSMENT
54M with PMH of HTN, HLD, ESRD on HD MWF via LUE AVF, ascites (requiring repeat paracenteses q4-6wks), NICM with moderate LV dysfunction w/ EF 35-40%() and CAD s/p atherectomy + SALLIE to D1(2024) presents to [facility] transferred from [facility]. Patient initially presented to [facility] ED with shortness of breath. Of note he was recently admitted from - for acute cholecystitis s/p cholecystectomy. In [facility] ED, pt was hypoxic to 86% on RA, trop 1.7K, EKG no new changes, CXR fluid overload. Admitted for AHRF 2/2 fluid overload. Pt received HD on , ,  and . DAPT was resumed, TTE showed improvement in LVEF to 40-45%. Stress test was abnormal with 1mm inferior STD, reversible ischemia in the inferior wall and fixed defects in the lateral, anterior and apical walls with post stress EF of 24%.     Pt was then transferred to [facility] for cardiac cath which showed pLAD 70% ISR, mLCx 70%, D1 severe dz.     Patient now transferred to [facility] CTS Dr. Rivers for CABG eval.        # CAD s/p NSTEMI  Hx CAD s/p PCI LAD   Presented with ADHF elevated troponins  Positive NST  -Cath- pLAD 70% ISR, mLCx 70%, D1 severe dz.   TTE EF 35% Severe TR  Was on Plavix-p2y12- 321 been off Plavix since -POD#1-C3L free LIMA-LAD; SVG-Diag; VEO-rltyZC-Gkgcxxkup on  Sinus rhythm,Amio for AF prophylaxis  -OOB off  Sinus rhythm   -POD#3-On /pressors-EF 25-30% RV failure with transaminitis-Sinus Rhythm  -POD#4-Was intubated for hypoxia-gradual hypotension on /pressors had IABP inserted this morning      # Acute on Chronic Systolic Heart Failure  EF-35% ,severe TR  Had HD post op  GDMT post op  LVEF improved post bypass but now at 23-30%-BiV dysfunction on /pressors  Vascular to evaluate AVGraft /steal causing RV failure      # Ascites  Hx chronic ascites  Has drainage q4-6 weeks  Last drained -4600mL  ? ascites related to severe TR  Monitor        # ESRD  On HD  HD post op

## 2025-01-03 NOTE — CONSULT NOTE ADULT - ASSESSMENT
54M with PMH of HTN, HLD, ESRD on HD MWF via LUE AVG (2019, unknown where),  s/p CABG by Dr Rivers on 12/30 no C/w cardiogenic shock now on multiple pressors, intubated  and requiring CRRT. vascular surgery consulted for aneurysmal LUE fistula to r/o still Sd causing Right ventricular decompensation.     PLAN:  - No acute surgical intervention  - Very low suspicion of steal Sd and AVG causing current clinical state.   - Pls obtain LUE duplex when able     Discussed with fellow Dr Mina on behalf of Dr Radha Champion PGY3  Vascular surgery  [id number]   54M with PMH of HTN, HLD, ESRD on HD MWF via LUE AVG (2019, unknown where),  s/p CABG by Dr Rivers on 12/30 no C/w cardiogenic shock now on multiple pressors, intubated  and requiring CRRT. vascular surgery consulted for aneurysmal LUE fistula to r/o still Sd causing Right ventricular decompensation.     PLAN:  - No acute surgical intervention  - Very low suspicion of steal Sd and AVF causing current clinical state.   - Pls obtain LUE duplex when able     Discussed with fellow Dr Mina on behalf of Dr Radha Champion PGY3  Vascular surgery  [id number]

## 2025-01-03 NOTE — PROGRESS NOTE ADULT - ASSESSMENT
54M with PMH of HTN, HLD, ESRD on HD MWF via LUE AVF, ascites (requiring repeat paracenteses q4-6wks), NICM with moderate LV dysfunction w/ EF 35-40%(2023) and CAD s/p atherectomy + SALLIE to D1(4/11/2024) presents to [facility] transferred from [facility] for CABG eval  Nephro on [facility] for HD    ESRD on HD   Regular schedule MWF   Access LUE AVF  Center [facility]  Nephrologist Dr. Bailey  Last HD on 12/24  S/p HD on 12/27 12/29, 12/30 for hyperkalemia and 12/31  2 L PUF and planned for HD again today 2 L UF as tolerated, patient intubated again overnight   iHD as needed  Keep MAP>65  Renal Diet  Consent in physical chart    Hyperkalemia  Monitor K     HTN  BP controlled   cw home meds  monitor bp     CKD MBD  Can send a PTH  Ca and Phos daily    Anemia  CRISTIANE with HD  Transfuse if hgb <7    CAD with multivessel disease  s/p CABG 12/30  CTICU following 54M with PMH of HTN, HLD, ESRD on HD MWF via LUE AVF, ascites (requiring repeat paracenteses q4-6wks), NICM with moderate LV dysfunction w/ EF 35-40%(2023) and CAD s/p atherectomy + SALLIE to D1(4/11/2024) presents to [facility] transferred from [facility] for CABG eval  Nephro on [facility] for HD    ESRD on HD   Regular schedule MWF   Access LUE AVF  Center [facility]  Nephrologist Dr. Bailey  Last HD on 12/24  S/p HD on 12/27 12/29, 12/30 for hyperkalemia and 12/31  2 L PUF On 01/02/2024  However hospital course now complicated by Cardiogenic shock now on multiple pressors, Will plan to speak to primary team re CRRT  iHD as needed  Keep MAP>65  Renal Diet  Consent in physical chart    Hyperkalemia  Monitor K     HTN  BP controlled   cw home meds  monitor bp     CKD MBD  Can send a PTH  Ca and Phos daily    Anemia  CRISTIANE with HD  Transfuse if hgb <7    CAD with multivessel disease  s/p CABG 12/30  CTICU following 54M with PMH of HTN, HLD, ESRD on HD MWF via LUE AVF, ascites (requiring repeat paracenteses q4-6wks), NICM with moderate LV dysfunction w/ EF 35-40%(2023) and CAD s/p atherectomy + SALLIE to D1(4/11/2024) presents to [facility] transferred from [facility] for CABG eval  Nephro on [facility] for HD    ESRD on HD   Regular schedule MWF   Access LUE AVF  Center [facility]  Nephrologist Dr. Bailey  Last HD on 12/24  S/p HD on 12/27 12/29, 12/30 for hyperkalemia and 12/31  2 L PUF On 01/02/2024  However hospital course now complicated by Cardiogenic shock now on multiple pressors,   Spoke with primary team and will arrange for CRRT   iHD as needed  Keep MAP>65  Renal Diet  Consent in physical chart    Hyperkalemia  Monitor K     HTN  BP controlled   cw home meds  monitor bp     CKD MBD  Can send a PTH  Ca and Phos daily    Anemia  CRISTIANE with HD  Transfuse if hgb <7    CAD with multivessel disease  s/p CABG 12/30  CTICU following

## 2025-01-03 NOTE — PROGRESS NOTE ADULT - SUBJECTIVE AND OBJECTIVE BOX
Patient seen and examined at the bedside.    Remained critically ill on continuous ICU monitoring.    OBJECTIVE:  Vital Signs Last 24 Hrs  T(C): 37.8 (03 Jan 2025 16:00), Max: 38.4 (02 Jan 2025 20:00)  T(F): 100 (03 Jan 2025 16:00), Max: 101.2 (02 Jan 2025 20:00)  HR: 77 (03 Jan 2025 19:00) (69 - 89)  BP: 87/54 (03 Jan 2025 00:00) (87/54 - 145/67)  BP(mean): 65 (03 Jan 2025 00:00) (65 - 97)  RR: 18 (03 Jan 2025 19:00) (5 - 28)  SpO2: 99% (03 Jan 2025 19:00) (77% - 100%)    Parameters below as of 03 Jan 2025 18:04  Patient On (Oxygen Delivery Method): ventilator          Physical Exam:   General: NAD   Neurology: nonfocal   Eyes: bilateral pupils equal and reactive   ENT/Neck: Neck supple, trachea midline, No JVD   Respiratory: Clear bilaterally   CV: S1S2, no murmurs        [x] Sternal dressing, [x] L. Pleural CT         [x] Sinus rhythm   Abdominal: Soft, NT, ND +BS   Extremities: 1-2+ pedal edema noted, + peripheral pulses   Skin: No Rashes, Hematoma, Ecchymosis                           Assessment:  54M with PMH of HTN, HLD, ESRD on HD MWF via LUE AVF, ascites (requiring repeat paracenteses q4-6wks), NICM with moderate LV dysfunction w/ EF 35-40%(2023) and CAD s/p atherectomy + SALLIE to D1(4/11/2024) presents to [facility] transferred from [facility].    Multi-CAD s/p C3L on 12/30/24  Hemodynamic instability  Hypovolemia  Lactic acidosis  Post op respiratory insufficiency  Acute blood loss anemia  Thrombocytopenia  Plan:   ***Neuro***  [x] Nonfocal   Post operative neuro assessment   Tylenol, Gabapentin, Robaxin, PRN Fentanyl, and PRN Oxycodone for pain management.    ***Cardiovascular***  Invasive hemodynamic monitoring, assess perfusion indices   SR / CVP 17 / MAP 69 / Hct 29.9% / Lactate 6.6  [x] Levophed - 0.05 mcg/kg/min [x] Vasopressin - 0.03 Unit(s)/Min [x] Epinephrine - 0.04 mcg/kg/min [x] Dobutamine - 7.5 mcg/kg/min  [x]  L. Fem IABP 1:1  Volume:  [x] Albumin 50mLx2, [x] Albumin 250mL  Reassessment of hemodynamics post resuscitation   Monitor chest tube output. -- Removed Mediastinal CTx2 yesterday (1/2/25)  [x]  ASA [x]  Plavix   Serial EKG and cardiac enzymes   Bronched yesterday during the day and again this morning  Repeat ECHO performed today -- Decreased BiV function, Severe TR, and LVEF 35%     ***Pulmonary***  [x] Alex 20 PPM  Post op vent management   Titration of FiO2 and PEEP, follow SpO2, CXR, blood gasses   Continue bronchodilators as needed.    Mode: AC/ CMV (Assist Control/ Continuous Mandatory Ventilation)  RR (machine): 14  TV (machine): 450  FiO2: 50  PEEP: 8  ITime: 1  MAP: 11  PIP: 30              ***GI***  [x] NPO   [x] Protonix   Bowel regimen with Miralax and Senna     ***Renal***  [x] ESRD on HD   Continue to monitor I/Os, BUN/Creatinine.   Replete lytes PRN  Vigil present  CVVH on since 3p today, currently net even   Nephro-Lisette for renal support    ***ID***  Empiric Meropenem  Send Vanc level and f/u results   Paracentesis performed today (1/3); + E. Coli Cx in Sputum and Blood    ***Endocrine***  [x] DM2 : HbA1c 5.6%                - [x] ISS             - Need tight glycemic control to prevent wound infection.            Patient requires continuous monitoring with bedside rhythm monitoring, pulse oximetry monitoring, and continuous central venous and arterial pressure monitoring; and intermittent blood gas analysis. Care plan discussed with the ICU care team.   Patient remained critical, at risk for life threatening decompensation.    I have spent 10 minutes providing critical care management to this patient.    By signing my name below, I, Mel Castro, attest that this documentation has been prepared under the direction and in the presence of MYRA Gil  Electronically signed: Evelio Solitario, 01-03-25 @ 19:56    I, MYRA Gil, personally performed the services described in this documentation. all medical record entries made by the scribe were at my direction and in my presence. I have reviewed the chart and agree that the record reflects my personal performance and is accurate and complete  Electronically signed: MYRA Gil

## 2025-01-04 LAB
-  AMPICILLIN/SULBACTAM: SIGNIFICANT CHANGE UP
-  AMPICILLIN/SULBACTAM: SIGNIFICANT CHANGE UP
-  AMPICILLIN: SIGNIFICANT CHANGE UP
-  AMPICILLIN: SIGNIFICANT CHANGE UP
-  AZTREONAM: SIGNIFICANT CHANGE UP
-  AZTREONAM: SIGNIFICANT CHANGE UP
-  CEFAZOLIN: SIGNIFICANT CHANGE UP
-  CEFAZOLIN: SIGNIFICANT CHANGE UP
-  CEFEPIME: SIGNIFICANT CHANGE UP
-  CEFEPIME: SIGNIFICANT CHANGE UP
-  CEFTRIAXONE: SIGNIFICANT CHANGE UP
-  CEFTRIAXONE: SIGNIFICANT CHANGE UP
-  CIPROFLOXACIN: SIGNIFICANT CHANGE UP
-  ERTAPENEM: SIGNIFICANT CHANGE UP
-  GENTAMICIN: SIGNIFICANT CHANGE UP
-  GENTAMICIN: SIGNIFICANT CHANGE UP
-  IMIPENEM: SIGNIFICANT CHANGE UP
-  IMIPENEM: SIGNIFICANT CHANGE UP
-  LEVOFLOXACIN: SIGNIFICANT CHANGE UP
-  LEVOFLOXACIN: SIGNIFICANT CHANGE UP
-  MEROPENEM: SIGNIFICANT CHANGE UP
-  MEROPENEM: SIGNIFICANT CHANGE UP
-  PIPERACILLIN/TAZOBACTAM: SIGNIFICANT CHANGE UP
-  PIPERACILLIN/TAZOBACTAM: SIGNIFICANT CHANGE UP
-  TOBRAMYCIN: SIGNIFICANT CHANGE UP
-  TOBRAMYCIN: SIGNIFICANT CHANGE UP
-  TRIMETHOPRIM/SULFAMETHOXAZOLE: SIGNIFICANT CHANGE UP
-  TRIMETHOPRIM/SULFAMETHOXAZOLE: SIGNIFICANT CHANGE UP
ALBUMIN SERPL ELPH-MCNC: 3.4 G/DL — SIGNIFICANT CHANGE UP (ref 3.3–5)
ALBUMIN SERPL ELPH-MCNC: 3.5 G/DL — SIGNIFICANT CHANGE UP (ref 3.3–5)
ALP SERPL-CCNC: 73 U/L — SIGNIFICANT CHANGE UP (ref 40–120)
ALP SERPL-CCNC: 85 U/L — SIGNIFICANT CHANGE UP (ref 40–120)
ALT FLD-CCNC: 867 U/L — HIGH (ref 10–45)
ALT FLD-CCNC: 938 U/L — HIGH (ref 10–45)
ANION GAP SERPL CALC-SCNC: 20 MMOL/L — HIGH (ref 5–17)
ANION GAP SERPL CALC-SCNC: 25 MMOL/L — HIGH (ref 5–17)
AST SERPL-CCNC: 1029 U/L — HIGH (ref 10–40)
AST SERPL-CCNC: 690 U/L — HIGH (ref 10–40)
BASE EXCESS BLDV CALC-SCNC: -4.2 MMOL/L — LOW (ref -2–3)
BASE EXCESS BLDV CALC-SCNC: -4.4 MMOL/L — LOW (ref -2–3)
BASE EXCESS BLDV CALC-SCNC: -5 MMOL/L — LOW (ref -2–3)
BASE EXCESS BLDV CALC-SCNC: -5 MMOL/L — LOW (ref -2–3)
BASE EXCESS BLDV CALC-SCNC: -5.1 MMOL/L — LOW (ref -2–3)
BASE EXCESS BLDV CALC-SCNC: -6 MMOL/L — LOW (ref -2–3)
BASE EXCESS BLDV CALC-SCNC: -7 MMOL/L — LOW (ref -2–3)
BASOPHILS # BLD AUTO: 0.03 K/UL — SIGNIFICANT CHANGE UP (ref 0–0.2)
BASOPHILS NFR BLD AUTO: 0.2 % — SIGNIFICANT CHANGE UP (ref 0–2)
BILIRUB SERPL-MCNC: 2 MG/DL — HIGH (ref 0.2–1.2)
BILIRUB SERPL-MCNC: 2.5 MG/DL — HIGH (ref 0.2–1.2)
BUN SERPL-MCNC: 36 MG/DL — HIGH (ref 7–23)
BUN SERPL-MCNC: 42 MG/DL — HIGH (ref 7–23)
CALCIUM SERPL-MCNC: 8.5 MG/DL — SIGNIFICANT CHANGE UP (ref 8.4–10.5)
CALCIUM SERPL-MCNC: 8.6 MG/DL — SIGNIFICANT CHANGE UP (ref 8.4–10.5)
CHLORIDE SERPL-SCNC: 95 MMOL/L — LOW (ref 96–108)
CHLORIDE SERPL-SCNC: 96 MMOL/L — SIGNIFICANT CHANGE UP (ref 96–108)
CO2 BLDV-SCNC: 21 MMOL/L — LOW (ref 22–26)
CO2 BLDV-SCNC: 22 MMOL/L — SIGNIFICANT CHANGE UP (ref 22–26)
CO2 BLDV-SCNC: 23 MMOL/L — SIGNIFICANT CHANGE UP (ref 22–26)
CO2 BLDV-SCNC: 24 MMOL/L — SIGNIFICANT CHANGE UP (ref 22–26)
CO2 BLDV-SCNC: 24 MMOL/L — SIGNIFICANT CHANGE UP (ref 22–26)
CO2 SERPL-SCNC: 17 MMOL/L — LOW (ref 22–31)
CO2 SERPL-SCNC: 20 MMOL/L — LOW (ref 22–31)
CREAT SERPL-MCNC: 3.81 MG/DL — HIGH (ref 0.5–1.3)
CREAT SERPL-MCNC: 4.91 MG/DL — HIGH (ref 0.5–1.3)
CULTURE RESULTS: ABNORMAL
EGFR: 13 ML/MIN/1.73M2 — LOW
EGFR: 13 ML/MIN/1.73M2 — LOW
EGFR: 18 ML/MIN/1.73M2 — LOW
EGFR: 18 ML/MIN/1.73M2 — LOW
EOSINOPHIL # BLD AUTO: 0.02 K/UL — SIGNIFICANT CHANGE UP (ref 0–0.5)
EOSINOPHIL NFR BLD AUTO: 0.2 % — SIGNIFICANT CHANGE UP (ref 0–6)
FIBRINOGEN PPP-MCNC: 406 MG/DL — SIGNIFICANT CHANGE UP (ref 200–445)
GAS PNL BLDA: SIGNIFICANT CHANGE UP
GAS PNL BLDV: SIGNIFICANT CHANGE UP
GLUCOSE SERPL-MCNC: 151 MG/DL — HIGH (ref 70–99)
GLUCOSE SERPL-MCNC: 184 MG/DL — HIGH (ref 70–99)
HAPTOGLOB SERPL-MCNC: 159 MG/DL — SIGNIFICANT CHANGE UP (ref 34–200)
HCO3 BLDV-SCNC: 20 MMOL/L — LOW (ref 22–29)
HCO3 BLDV-SCNC: 20 MMOL/L — LOW (ref 22–29)
HCO3 BLDV-SCNC: 21 MMOL/L — LOW (ref 22–29)
HCO3 BLDV-SCNC: 22 MMOL/L — SIGNIFICANT CHANGE UP (ref 22–29)
HCO3 BLDV-SCNC: 22 MMOL/L — SIGNIFICANT CHANGE UP (ref 22–29)
HCO3 BLDV-SCNC: 23 MMOL/L — SIGNIFICANT CHANGE UP (ref 22–29)
HCT VFR BLD CALC: 28 % — LOW (ref 39–50)
HCT VFR BLD CALC: 28.2 % — LOW (ref 39–50)
HEPARIN-PF4 AB RESULT: <0.6 U/ML — SIGNIFICANT CHANGE UP (ref 0–0.9)
HEPARINASE TEG R TIME: 14.6 MIN — HIGH (ref 4.3–8.3)
HGB BLD-MCNC: 9.1 G/DL — LOW (ref 13–17)
HGB BLD-MCNC: 9.1 G/DL — LOW (ref 13–17)
HOROWITZ INDEX BLDV+IHG-RTO: 50 — SIGNIFICANT CHANGE UP
IMM GRANULOCYTES NFR BLD AUTO: 3 % — HIGH (ref 0–0.9)
INR BLD: 2.83 RATIO — HIGH (ref 0.85–1.16)
INR BLD: 2.97 RATIO — HIGH (ref 0.85–1.16)
LDH SERPL L TO P-CCNC: 659 U/L — HIGH (ref 50–242)
LYMPHOCYTES # BLD AUTO: 0.34 K/UL — LOW (ref 1–3.3)
LYMPHOCYTES # BLD AUTO: 2.6 % — LOW (ref 13–44)
MAGNESIUM SERPL-MCNC: 2.2 MG/DL — SIGNIFICANT CHANGE UP (ref 1.6–2.6)
MAGNESIUM SERPL-MCNC: 2.2 MG/DL — SIGNIFICANT CHANGE UP (ref 1.6–2.6)
MCHC RBC-ENTMCNC: 30.2 PG — SIGNIFICANT CHANGE UP (ref 27–34)
MCHC RBC-ENTMCNC: 30.7 PG — SIGNIFICANT CHANGE UP (ref 27–34)
MCHC RBC-ENTMCNC: 32.3 G/DL — SIGNIFICANT CHANGE UP (ref 32–36)
MCHC RBC-ENTMCNC: 32.5 G/DL — SIGNIFICANT CHANGE UP (ref 32–36)
MCV RBC AUTO: 93 FL — SIGNIFICANT CHANGE UP (ref 80–100)
MCV RBC AUTO: 95.3 FL — SIGNIFICANT CHANGE UP (ref 80–100)
METHOD TYPE: SIGNIFICANT CHANGE UP
METHOD TYPE: SIGNIFICANT CHANGE UP
MONOCYTES # BLD AUTO: 0.51 K/UL — SIGNIFICANT CHANGE UP (ref 0–0.9)
MONOCYTES NFR BLD AUTO: 3.8 % — SIGNIFICANT CHANGE UP (ref 2–14)
NEUTROPHILS # BLD AUTO: 11.99 K/UL — HIGH (ref 1.8–7.4)
NEUTROPHILS NFR BLD AUTO: 90.2 % — HIGH (ref 43–77)
NRBC BLD-RTO: 0 /100 WBCS — SIGNIFICANT CHANGE UP (ref 0–0)
NRBC BLD-RTO: 0 /100 WBCS — SIGNIFICANT CHANGE UP (ref 0–0)
ORGANISM # SPEC MICROSCOPIC CNT: ABNORMAL
PCO2 BLDV: 42 MMHG — SIGNIFICANT CHANGE UP (ref 42–55)
PCO2 BLDV: 43 MMHG — SIGNIFICANT CHANGE UP (ref 42–55)
PCO2 BLDV: 44 MMHG — SIGNIFICANT CHANGE UP (ref 42–55)
PCO2 BLDV: 45 MMHG — SIGNIFICANT CHANGE UP (ref 42–55)
PCO2 BLDV: 45 MMHG — SIGNIFICANT CHANGE UP (ref 42–55)
PCO2 BLDV: 47 MMHG — SIGNIFICANT CHANGE UP (ref 42–55)
PF4 HEPARIN CMPLX AB SER-ACNC: NEGATIVE — SIGNIFICANT CHANGE UP
PH BLDV: 7.27 — LOW (ref 7.32–7.43)
PH BLDV: 7.28 — LOW (ref 7.32–7.43)
PH BLDV: 7.28 — LOW (ref 7.32–7.43)
PH BLDV: 7.29 — LOW (ref 7.32–7.43)
PH BLDV: 7.3 — LOW (ref 7.32–7.43)
PH BLDV: 7.3 — LOW (ref 7.32–7.43)
PH BLDV: 7.31 — LOW (ref 7.32–7.43)
PHOSPHATE SERPL-MCNC: 4.4 MG/DL — SIGNIFICANT CHANGE UP (ref 2.5–4.5)
PHOSPHATE SERPL-MCNC: 5.4 MG/DL — HIGH (ref 2.5–4.5)
PLATELET # BLD AUTO: 103 K/UL — LOW (ref 150–400)
PLATELET # BLD AUTO: 85 K/UL — LOW (ref 150–400)
PO2 BLDV: 45 MMHG — SIGNIFICANT CHANGE UP (ref 25–45)
PO2 BLDV: 46 MMHG — HIGH (ref 25–45)
PO2 BLDV: 49 MMHG — HIGH (ref 25–45)
PO2 BLDV: 51 MMHG — HIGH (ref 25–45)
PO2 BLDV: 51 MMHG — HIGH (ref 25–45)
PO2 BLDV: 55 MMHG — HIGH (ref 25–45)
PO2 BLDV: 56 MMHG — HIGH (ref 25–45)
PO2 BLDV: 57 MMHG — HIGH (ref 25–45)
POTASSIUM SERPL-MCNC: 5.1 MMOL/L — SIGNIFICANT CHANGE UP (ref 3.5–5.3)
POTASSIUM SERPL-SCNC: 5.1 MMOL/L — SIGNIFICANT CHANGE UP (ref 3.5–5.3)
POTASSIUM SERPL-SCNC: 5.1 MMOL/L — SIGNIFICANT CHANGE UP (ref 3.5–5.3)
PROT SERPL-MCNC: 6 G/DL — SIGNIFICANT CHANGE UP (ref 6–8.3)
PROT SERPL-MCNC: 6.5 G/DL — SIGNIFICANT CHANGE UP (ref 6–8.3)
PROTHROM AB SERPL-ACNC: 31.9 SEC — HIGH (ref 9.9–13.4)
RAPIDTEG MAXIMUM AMPLITUDE: 64.9 MM — SIGNIFICANT CHANGE UP (ref 52–70)
RBC # BLD: 2.96 M/UL — LOW (ref 4.2–5.8)
RBC # BLD: 3.01 M/UL — LOW (ref 4.2–5.8)
RBC # FLD: 19.2 % — HIGH (ref 10.3–14.5)
RBC # FLD: 19.6 % — HIGH (ref 10.3–14.5)
SAO2 % BLDV: 79.8 % — SIGNIFICANT CHANGE UP (ref 67–88)
SAO2 % BLDV: 82 % — SIGNIFICANT CHANGE UP (ref 67–88)
SAO2 % BLDV: 82 % — SIGNIFICANT CHANGE UP (ref 67–88)
SAO2 % BLDV: 82.7 % — SIGNIFICANT CHANGE UP (ref 67–88)
SAO2 % BLDV: 83 % — SIGNIFICANT CHANGE UP (ref 67–88)
SAO2 % BLDV: 83.2 % — SIGNIFICANT CHANGE UP (ref 67–88)
SAO2 % BLDV: 86.1 % — SIGNIFICANT CHANGE UP (ref 67–88)
SAO2 % BLDV: 88.9 % — HIGH (ref 67–88)
SODIUM SERPL-SCNC: 135 MMOL/L — SIGNIFICANT CHANGE UP (ref 135–145)
SODIUM SERPL-SCNC: 138 MMOL/L — SIGNIFICANT CHANGE UP (ref 135–145)
SPECIMEN SOURCE: SIGNIFICANT CHANGE UP
TEG FUNCTIONAL FIBRINOGEN: 28.5 MM — SIGNIFICANT CHANGE UP (ref 15–32)
TEG MAXIMUM AMPLITUDE: 65 MM — SIGNIFICANT CHANGE UP (ref 52–69)
TEG REACTION TIME: 17 MIN — HIGH (ref 4.6–9.1)
VANCOMYCIN TROUGH SERPL-MCNC: 19.8 UG/ML — SIGNIFICANT CHANGE UP (ref 10–20)
WBC # BLD: 13.29 K/UL — HIGH (ref 3.8–10.5)
WBC # BLD: 19.46 K/UL — HIGH (ref 3.8–10.5)
WBC # FLD AUTO: 13.29 K/UL — HIGH (ref 3.8–10.5)
WBC # FLD AUTO: 19.46 K/UL — HIGH (ref 3.8–10.5)

## 2025-01-04 PROCEDURE — 99291 CRITICAL CARE FIRST HOUR: CPT

## 2025-01-04 PROCEDURE — 99292 CRITICAL CARE ADDL 30 MIN: CPT

## 2025-01-04 PROCEDURE — 71045 X-RAY EXAM CHEST 1 VIEW: CPT | Mod: 26

## 2025-01-04 PROCEDURE — 99232 SBSQ HOSP IP/OBS MODERATE 35: CPT

## 2025-01-04 PROCEDURE — G0545: CPT

## 2025-01-04 PROCEDURE — 93010 ELECTROCARDIOGRAM REPORT: CPT

## 2025-01-04 RX ORDER — DEXTROSE 50 % IN WATER 50 %
25 SYRINGE (ML) INTRAVENOUS ONCE
Refills: 0 | Status: COMPLETED | OUTPATIENT
Start: 2025-01-04 | End: 2025-01-04

## 2025-01-04 RX ORDER — SODIUM BICARBONATE 1 MEQ/ML
650 SYRINGE (ML) INTRAVENOUS EVERY 8 HOURS
Refills: 0 | Status: DISCONTINUED | OUTPATIENT
Start: 2025-01-04 | End: 2025-01-06

## 2025-01-04 RX ORDER — CALCIUM GLUCONATE 20 MG/ML
2 INJECTION, SOLUTION INTRAVENOUS ONCE
Refills: 0 | Status: COMPLETED | OUTPATIENT
Start: 2025-01-04 | End: 2025-01-04

## 2025-01-04 RX ORDER — DEXMEDETOMIDINE HYDROCHLORIDE IN SODIUM CHLORIDE 4 UG/ML
0.5 INJECTION INTRAVENOUS
Qty: 200 | Refills: 0 | Status: DISCONTINUED | OUTPATIENT
Start: 2025-01-04 | End: 2025-01-11

## 2025-01-04 RX ORDER — FENTANYL CITRATE-0.9 % NACL/PF 100MCG/2ML
75 SYRINGE (ML) INTRAVENOUS ONCE
Refills: 0 | Status: DISCONTINUED | OUTPATIENT
Start: 2025-01-04 | End: 2025-01-04

## 2025-01-04 RX ORDER — NOREPINEPHRINE BITARTRATE 8 MG
0.08 SOLUTION INTRAVENOUS
Qty: 16 | Refills: 0 | Status: DISCONTINUED | OUTPATIENT
Start: 2025-01-04 | End: 2025-01-05

## 2025-01-04 RX ORDER — DOBUTAMINE 250 MG/20ML
2.5 INJECTION INTRAVENOUS
Qty: 1000 | Refills: 0 | Status: DISCONTINUED | OUTPATIENT
Start: 2025-01-04 | End: 2025-01-08

## 2025-01-04 RX ORDER — FENTANYL CITRATE-0.9 % NACL/PF 100MCG/2ML
50 SYRINGE (ML) INTRAVENOUS ONCE
Refills: 0 | Status: DISCONTINUED | OUTPATIENT
Start: 2025-01-04 | End: 2025-01-04

## 2025-01-04 RX ADMIN — Medication 667 MILLIGRAM(S): at 18:20

## 2025-01-04 RX ADMIN — Medication 1 TABLET(S): at 13:44

## 2025-01-04 RX ADMIN — DOBUTAMINE 9.57 MICROGRAM(S)/KG/MIN: 250 INJECTION INTRAVENOUS at 19:37

## 2025-01-04 RX ADMIN — MEROPENEM 100 MILLIGRAM(S): 1 INJECTION INTRAVENOUS at 13:45

## 2025-01-04 RX ADMIN — DEXMEDETOMIDINE HYDROCHLORIDE IN SODIUM CHLORIDE 10.6 MICROGRAM(S)/KG/HR: 4 INJECTION INTRAVENOUS at 09:05

## 2025-01-04 RX ADMIN — Medication 100 MILLIGRAM(S): at 05:44

## 2025-01-04 RX ADMIN — Medication 15 MILLILITER(S): at 18:19

## 2025-01-04 RX ADMIN — Medication 1 APPLICATION(S): at 21:39

## 2025-01-04 RX ADMIN — CLOPIDOGREL BISULFATE 75 MILLIGRAM(S): 75 TABLET, FILM COATED ORAL at 13:45

## 2025-01-04 RX ADMIN — CALCIUM GLUCONATE 200 GRAM(S): 20 INJECTION, SOLUTION INTRAVENOUS at 02:26

## 2025-01-04 RX ADMIN — DEXMEDETOMIDINE HYDROCHLORIDE IN SODIUM CHLORIDE 10.6 MICROGRAM(S)/KG/HR: 4 INJECTION INTRAVENOUS at 03:26

## 2025-01-04 RX ADMIN — DEXTROSE MONOHYDRATE 10 MILLILITER(S): 100 INJECTION, SOLUTION INTRAVENOUS at 09:06

## 2025-01-04 RX ADMIN — Medication 10 MILLIGRAM(S): at 21:26

## 2025-01-04 RX ADMIN — DOBUTAMINE 9.57 MICROGRAM(S)/KG/MIN: 250 INJECTION INTRAVENOUS at 05:34

## 2025-01-04 RX ADMIN — Medication 650 MILLIGRAM(S): at 21:26

## 2025-01-04 RX ADMIN — Medication 500 MILLIGRAM(S): at 18:28

## 2025-01-04 RX ADMIN — Medication 75 MICROGRAM(S): at 11:19

## 2025-01-04 RX ADMIN — GABAPENTIN 100 MILLIGRAM(S): 400 CAPSULE ORAL at 13:44

## 2025-01-04 RX ADMIN — IPRATROPIUM BROMIDE AND ALBUTEROL SULFATE 3 MILLILITER(S): .5; 2.5 SOLUTION RESPIRATORY (INHALATION) at 23:45

## 2025-01-04 RX ADMIN — Medication 6.38 MICROGRAM(S)/KG/MIN: at 05:34

## 2025-01-04 RX ADMIN — Medication 40 MILLIGRAM(S): at 13:31

## 2025-01-04 RX ADMIN — NOREPINEPHRINE BITARTRATE 6.38 MICROGRAM(S)/KG/MIN: 8 SOLUTION at 05:33

## 2025-01-04 RX ADMIN — Medication 25 MILLILITER(S): at 09:10

## 2025-01-04 RX ADMIN — Medication 100 MILLIGRAM(S): at 13:43

## 2025-01-04 RX ADMIN — IPRATROPIUM BROMIDE AND ALBUTEROL SULFATE 3 MILLILITER(S): .5; 2.5 SOLUTION RESPIRATORY (INHALATION) at 11:15

## 2025-01-04 RX ADMIN — IPRATROPIUM BROMIDE AND ALBUTEROL SULFATE 3 MILLILITER(S): .5; 2.5 SOLUTION RESPIRATORY (INHALATION) at 17:22

## 2025-01-04 RX ADMIN — NOREPINEPHRINE BITARTRATE 6.38 MICROGRAM(S)/KG/MIN: 8 SOLUTION at 09:06

## 2025-01-04 RX ADMIN — MEROPENEM 100 MILLIGRAM(S): 1 INJECTION INTRAVENOUS at 20:47

## 2025-01-04 RX ADMIN — METHOCARBAMOL 500 MILLIGRAM(S): 500 TABLET, FILM COATED ORAL at 21:27

## 2025-01-04 RX ADMIN — Medication 10 MILLIGRAM(S): at 05:45

## 2025-01-04 RX ADMIN — Medication 75 MICROGRAM(S): at 11:35

## 2025-01-04 RX ADMIN — VASOPRESSIN 4.5 UNIT(S)/MIN: 20 INJECTION INTRAVENOUS at 09:05

## 2025-01-04 RX ADMIN — Medication 250 MILLIGRAM(S): at 05:59

## 2025-01-04 RX ADMIN — Medication 15 MILLILITER(S): at 05:45

## 2025-01-04 RX ADMIN — Medication 50 MICROGRAM(S): at 15:45

## 2025-01-04 RX ADMIN — Medication 5 UNIT(S): at 09:10

## 2025-01-04 RX ADMIN — Medication 81 MILLIGRAM(S): at 13:44

## 2025-01-04 RX ADMIN — Medication 4 MILLILITER(S): at 17:23

## 2025-01-04 RX ADMIN — Medication 4 MILLILITER(S): at 05:32

## 2025-01-04 RX ADMIN — Medication 650 MILLIGRAM(S): at 13:44

## 2025-01-04 RX ADMIN — Medication 10 MILLIGRAM(S): at 13:44

## 2025-01-04 RX ADMIN — DEXMEDETOMIDINE HYDROCHLORIDE IN SODIUM CHLORIDE 10.6 MICROGRAM(S)/KG/HR: 4 INJECTION INTRAVENOUS at 19:37

## 2025-01-04 RX ADMIN — METHOCARBAMOL 500 MILLIGRAM(S): 500 TABLET, FILM COATED ORAL at 13:44

## 2025-01-04 RX ADMIN — Medication 10 MILLILITER(S): at 09:07

## 2025-01-04 RX ADMIN — Medication 667 MILLIGRAM(S): at 13:45

## 2025-01-04 RX ADMIN — DOBUTAMINE 9.57 MICROGRAM(S)/KG/MIN: 250 INJECTION INTRAVENOUS at 09:06

## 2025-01-04 RX ADMIN — Medication 6.38 MICROGRAM(S)/KG/MIN: at 09:06

## 2025-01-04 RX ADMIN — IPRATROPIUM BROMIDE AND ALBUTEROL SULFATE 3 MILLILITER(S): .5; 2.5 SOLUTION RESPIRATORY (INHALATION) at 05:31

## 2025-01-04 RX ADMIN — Medication 50 MICROGRAM(S): at 16:00

## 2025-01-04 RX ADMIN — VASOPRESSIN 4.5 UNIT(S)/MIN: 20 INJECTION INTRAVENOUS at 19:37

## 2025-01-04 RX ADMIN — Medication 650 MILLIGRAM(S): at 07:32

## 2025-01-04 RX ADMIN — MEROPENEM 100 MILLIGRAM(S): 1 INJECTION INTRAVENOUS at 04:11

## 2025-01-04 NOTE — PROGRESS NOTE ADULT - SUBJECTIVE AND OBJECTIVE BOX
Follow Up:  gnr bacteremia    Interval History/ROS: sedated on vent    Allergies  No Known Allergies    ANTIMICROBIALS:  meropenem  IVPB 1000 every 8 hours  vancomycin  IVPB 1000 every 12 hours      OTHER MEDS:  MEDICATIONS  (STANDING):  acetaminophen     Tablet .. 650 every 6 hours PRN  albuterol/ipratropium for Nebulization 3 every 6 hours  aspirin  chewable 81 daily  bisacodyl Suppository 10 once  clopidogrel Tablet 75 daily  dexMEDEtomidine Infusion 0.5 <Continuous>  dextrose 50% Injectable 50 every 15 minutes  dextrose 50% Injectable 25 every 15 minutes  DOBUTamine Infusion 7.5 <Continuous>  EPINEPHrine    Infusion 0.04 <Continuous>  epoetin ignacia (EPOGEN) Injectable 28492 <User Schedule>  fentaNYL    Injectable 75 once  fentaNYL    Injectable 12.5 every 2 hours PRN  fentaNYL    Injectable 25 every 3 hours PRN  gabapentin 100 every 8 hours  hydrocortisone sodium succinate Injectable 100 every 8 hours  insulin lispro (ADMELOG) corrective regimen sliding scale  three times a day before meals  insulin lispro (ADMELOG) corrective regimen sliding scale  at bedtime  methocarbamol 500 every 8 hours  metoclopramide Injectable 10 every 8 hours  norepinephrine Infusion 0.08 <Continuous>  oxyCODONE    IR 5 every 4 hours PRN  oxyCODONE    IR 10 every 4 hours PRN  pantoprazole  Injectable 40 daily  polyethylene glycol 3350 17 two times a day  senna 2 at bedtime  sodium chloride 3%  Inhalation 4 every 12 hours  vasopressin Infusion 0.03 <Continuous>      Vital Signs Last 24 Hrs  T(C): 37.7 (04 Jan 2025 08:00), Max: 38.4 (03 Jan 2025 12:00)  T(F): 99.9 (04 Jan 2025 08:00), Max: 101.1 (03 Jan 2025 12:00)  HR: 75 (04 Jan 2025 10:45) (68 - 85)  BP: --  BP(mean): --  RR: 18 (04 Jan 2025 10:45) (5 - 24)  SpO2: 99% (04 Jan 2025 10:45) (97% - 100%)    Parameters below as of 04 Jan 2025 08:00  Patient On (Oxygen Delivery Method): ventilator    O2 Concentration (%): 50    PHYSICAL EXAM:  General: sedated on vent, ill appearing, IABP in place  Neurology: unresponsive  Respiratory: fine crackle  CV: RRR, S1S2, no murmurs, rubs or gallops  Abdominal: Soft, Non-tender, sl distended, normal bowel sounds  Extremities: No edema, left foot mildly cool  Line Sites: Clear  Skin: No rash                          9.1    13.29 )-----------( 103      ( 04 Jan 2025 00:56 )             28.0   WBC Count: 13.29 (01-04 @ 00:56)  WBC Count: 9.39 (01-03 @ 16:17)  WBC Count: 4.71 (01-03 @ 09:25)  WBC Count: 1.11 (01-03 @ 01:31)  WBC Count: 1.35 (01-03 @ 00:43)  WBC Count: 14.34 (01-02 @ 00:33)  WBC Count: 14.49 (01-01 @ 00:24)  WBC Count: 12.51 (12-31 @ 00:30)  WBC Count: 12.27 (12-30 @ 19:08)       01-04    138  |  96  |  42[H]  ----------------------------<  184[H]  5.1   |  17[L]  |  4.91[H]    Ca    8.6      04 Jan 2025 00:56  Phos  5.4     01-04  Mg     2.2     01-04    TPro  6.0  /  Alb  3.5  /  TBili  2.0[H]  /  DBili  x   /  AST  1029[H]  /  ALT  938[H]  /  AlkPhos  73  01-04    MICROBIOLOGY:  Peritoneal  01-03-25   Testing in progress  --    polymorphonuclear leukocytes seen  No organisms seen  by cytocentrifuge      Bronchial  01-03-25 --  --  --      Bronchial  01-03-25 --  --    Numerous polymorphonuclear leukocytes seen per low power field  Numerous Gram Negative Rods seen per oil power field      .Blood BLOOD  01-02-25   Growth in aerobic and anaerobic bottles: Escherichia coli  --  +ESBL  +CTX-M resistance marker  Growth in aerobic bottle: Gram Negative Rods  Growth in anaerobic bottle: Gram Negative Rods      .Blood BLOOD  01-02-25   Growth in aerobic and anaerobic bottles: Escherichia coli  See previous culture [id number]  Direct identification is available within approximately 3-5  hours either by Blood Panel Multiplexed PCR or Direct  MALDI-TOF. Details: [email or web address]  --  Blood Culture PCR      .Sputum Sputum  01-02-25   Numerous Escherichia coli  Commensal manjit consistent with body site  --    Numerous polymorphonuclear leukocytes per low power field  Few Squamous epithelial cells per low power field  Moderate Gram positive cocci in pairs seen per oil power field  Moderate Gram Negative Rods seen per oil power field      Vancomycin Level, Trough: 19.8 ug/mL (01-04-25 @ 05:32)  Vancomycin Level, Trough: 9.3 ug/mL (01-03-25 @ 20:00)      RADIOLOGY:  < from: Xray Chest 1 View- PORTABLE-Urgent (Xray Chest 1 View- PORTABLE-Urgent .) (01.03.25 @ 13:36) >  FINDINGS:  Endotracheal tube tip above the ema.  Enteric tube courses below the diaphragm with tip out of view.  Right IJ central venous catheter, with tip in the SVC.  The heart is similar in size. Sternotomy. Mediastinal drain in place.  Intra-aortic balloon pump marker with tip approximately 2.0 cm below the   top of the aortic arch.  Bilateral perihilar opacities not significantly changed.  There is no pneumothorax  Left pleural effusion with associated atelectasis, unchanged from prior.  No acute osseous abnormalities.    IMPRESSION:  Stable pulmonary edema and left-sided pleural effusion.    < end of copied text >    Mesfin Alaniz MD; Division of Infectious Disease; Pager: [phone]; nights and weekends: [phone]

## 2025-01-04 NOTE — PROGRESS NOTE ADULT - SUBJECTIVE AND OBJECTIVE BOX
MR#[id number]  PATIENT NAME:RAAD CANO    DATE OF SERVICE: 01-04-25 @ 11:44  Patient was seen and examined by Solo Quick MD on    01-04-25 @ 11:44 .  Interim events noted.Consultant notes ,Labs,Telemetry reviewed by me       HOSPITAL COURSE: HPI:  54M with PMH of HTN, HLD, ESRD on HD MWF via LUE AVF, ascites (requiring repeat paracenteses q4-6wks), NICM with moderate LV dysfunction w/ EF 35-40%(2023) and CAD s/p atherectomy + SALLIE to D1(4/11/2024) presents to [facility] transferred from [facility]. Patient initially presented to [facility] ED with shortness of breath. Of note he was recently admitted from 09/27-12/02 for acute cholecystitis s/p cholecystectomy. In [facility] ED, pt was hypoxic to 86% on RA, trop 1.7K, EKG no new changes, CXR fluid overload. Admitted for AHRF 2/2 fluid overload. Pt received HD on 12/18, 12/19, 12/20 and 12/22. DAPT was resumed, TTE showed improvement in LVEF to 40-45%. Stress test was abnormal with 1mm inferior STD, reversible ischemia in the inferior wall and fixed defects in the lateral, anterior and apical walls with post stress EF of 24%. Pt was then transferred to [facility] for cardiac cath which showed pLAD 70% ISR, mLCx 70%, D1 severe dz. Patient now transferred to [facility] CTS Dr. Rivers for CABG eval. Patient denies any current SOB or chest pain on admission. Otherwise denies headaches, abdominal pain, urinary or bowel changes, fevers, chills, N/V/D, sick contacts.  (24 Dec 2024 05:07)      INTERIM EVENTS:Patient seen at bedside ,interim events noted.  12/23 Cardiac cath  pLAD 70% ISR, mLCx 70%, D1 severe dz.   12/31-POD#1-C3L free LIMA-LAD; SVG-Diag; SVG-leftPL Awake OOB extubated Sinus rhythm on Dobutamine gtt  01/03- Was intubated last night for airway protection s/p bronchoscopy This morning had IABP inserted  remains sedated intubated Sinus Rhythm  01/04- s/p IABP insertion, sepsis/septic shock due to GNR/ECOLI -Paracentesis for suspected bacterial peritonitis  Transfused 1u pbrcs overnight  Remains on inoptropes and pressors with IABP at 1:1      PMH -reviewed admission note, no change since admission    MEDICATIONS  (STANDING):  albuterol/ipratropium for Nebulization 3 milliLiter(s) Nebulizer every 6 hours  ascorbic acid 500 milliGRAM(s) Oral two times a day  aspirin  chewable 81 milliGRAM(s) Oral daily  bisacodyl Suppository 10 milliGRAM(s) Rectal once  calcium acetate 667 milliGRAM(s) Oral three times a day with meals  chlorhexidine 0.12% Liquid 15 milliLiter(s) Oral Mucosa every 12 hours  chlorhexidine 2% Cloths 1 Application(s) Topical daily  clopidogrel Tablet 75 milliGRAM(s) Oral daily  CRRT Treatment    <Continuous>  dexMEDEtomidine Infusion 0.5 MICROgram(s)/kG/Hr (10.6 mL/Hr) IV Continuous <Continuous>  dextrose 10%. 1000 milliLiter(s) (10 mL/Hr) IV Continuous <Continuous>  dextrose 50% Injectable 50 milliLiter(s) IV Push every 15 minutes  dextrose 50% Injectable 25 milliLiter(s) IV Push every 15 minutes  DOBUTamine Infusion 7.5 MICROgram(s)/kG/Min (9.57 mL/Hr) IV Continuous <Continuous>  EPINEPHrine    Infusion 0.04 MICROgram(s)/kG/Min (6.38 mL/Hr) IV Continuous <Continuous>  epoetin ignacia (EPOGEN) Injectable 69608 Unit(s) IV Push <User Schedule>  gabapentin 100 milliGRAM(s) Oral every 8 hours  hydrocortisone sodium succinate Injectable 100 milliGRAM(s) IV Push every 8 hours  insulin lispro (ADMELOG) corrective regimen sliding scale   SubCutaneous three times a day before meals  insulin lispro (ADMELOG) corrective regimen sliding scale   SubCutaneous at bedtime  lidocaine   4% Patch 2 Patch Transdermal daily  meropenem  IVPB 1000 milliGRAM(s) IV Intermittent every 8 hours  methocarbamol 500 milliGRAM(s) Oral every 8 hours  metoclopramide Injectable 10 milliGRAM(s) IV Push every 8 hours  Nephro-elicia 1 Tablet(s) Oral daily  norepinephrine Infusion 0.08 MICROgram(s)/kG/Min (6.38 mL/Hr) IV Continuous <Continuous>  pantoprazole  Injectable 40 milliGRAM(s) IV Push daily  polyethylene glycol 3350 17 Gram(s) Oral two times a day  PrismaSATE Dialysate BK 0 / 3.5 5000 milliLiter(s) (1000 mL/Hr) CRRT <Continuous>  PrismaSOL Filtration BGK 4 / 2.5 5000 milliLiter(s) (800 mL/Hr) CRRT <Continuous>  PrismaSOL Filtration BGK 4 / 2.5 5000 milliLiter(s) (200 mL/Hr) CRRT <Continuous>  senna 2 Tablet(s) Oral at bedtime  sodium bicarbonate 650 milliGRAM(s) Oral every 8 hours  sodium chloride 0.9%. 1000 milliLiter(s) (10 mL/Hr) IV Continuous <Continuous>  sodium chloride 3%  Inhalation 4 milliLiter(s) Inhalation every 12 hours  vancomycin  IVPB 1000 milliGRAM(s) IV Intermittent every 12 hours  vasopressin Infusion 0.03 Unit(s)/Min (4.5 mL/Hr) IV Continuous <Continuous>    MEDICATIONS  (PRN):  acetaminophen     Tablet .. 650 milliGRAM(s) Oral every 6 hours PRN Mild Pain (1 - 3)  fentaNYL    Injectable 12.5 MICROGram(s) IV Push every 2 hours PRN Moderate Pain (4 - 6)  fentaNYL    Injectable 25 MICROGram(s) IV Push every 3 hours PRN Severe Pain (7 - 10)  oxyCODONE    IR 5 milliGRAM(s) Oral every 4 hours PRN Moderate Pain (4 - 6)  oxyCODONE    IR 10 milliGRAM(s) Oral every 4 hours PRN Severe Pain (7 - 10)            REVIEW OF SYSTEMS:  Constitutional: [ ] fever, [ ]weight loss,  [ ]fatigue [ ]weight gain  Eyes: [ ] visual changes  Respiratory: [ ]shortness of breath;  [ ] cough, [ ]wheezing, [ ]chills, [ ]hemoptysis  Cardiovascular: [ ] chest pain, [ ]palpitations, [ ]dizziness,  [ ]leg swelling[ ]orthopnea[ ]PND  Gastrointestinal: [ ] abdominal pain, [ ]nausea, [ ]vomiting,  [ ]diarrhea [ ]Constipation [ ]Melena  Genitourinary: [ ] dysuria, [ ] hematuria [ ]Vigil  Neurologic: [ ] headaches [ ] tremors[ ]weakness [ ]Paralysis Right[ ] Left[ ]  Skin: [ ] itching, [ ]burning, [ ] rashes  Endocrine: [ ] heat or cold intolerance  Musculoskeletal: [ ] joint pain or swelling; [ ] muscle, back, or extremity pain  Psychiatric: [ ] depression, [ ]anxiety, [ ]mood swings, or [ ]difficulty sleeping  Hematologic: [ ] easy bruising, [ ] bleeding gums    [ ] All remaining systems negative except as per above.   [xUnable to obtain.  [x] No change in ROS since admission      Vital Signs Last 24 Hrs  T(C): 37.7 (04 Jan 2025 08:00), Max: 38.4 (03 Jan 2025 12:00)  T(F): 99.9 (04 Jan 2025 08:00), Max: 101.1 (03 Jan 2025 12:00)  HR: 69 (04 Jan 2025 11:33) (68 - 85)  BP: --111/38  RR: 18 (04 Jan 2025 11:30) (7 - 24)  SpO2: 94% (04 Jan 2025 11:33) (93% - 100%)    Parameters below as of 04 Jan 2025 11:33  Patient On (Oxygen Delivery Method): ventilator      I&O's Summary    03 Jan 2025 07:01  -  04 Jan 2025 07:00  --------------------------------------------------------  IN: 3872.8 mL / OUT: 4075 mL / NET: -202.2 mL    04 Jan 2025 07:01  -  04 Jan 2025 11:44  --------------------------------------------------------  IN: 231.7 mL / OUT: 700 mL / NET: -468.3 mL        PHYSICAL EXAM:  General: No acute distress BMI-31  HEENT: EOMI, PERRL  Neck: Supple, [ ] JVD  Lungs: Equal air entry bilaterally; [ ] rales [ ] wheezing [ ] rhonchi  Heart: Regular rate and rhythm; [x ] murmur   2/6 [ x] systolic [ ] diastolic [ ] radiation[ ] rubs [ ]  gallops  Abdomen: Nontender, bowel sounds present  Extremities: No clubbing, cyanosis, [ ] edema [ ]Pulses  equal and intact  Nervous system:  Sedated intubated  Psychiatric: Normal affect  Skin: No rashes or lesions    LABS:  01-04    138  |  96  |  42[H]  ----------------------------<  184[H]  5.1   |  17[L]  |  4.91[H]    Ca    8.6      04 Jan 2025 00:56  Phos  5.4     01-04  Mg     2.2     01-04    TPro  6.0  /  Alb  3.5  /  TBili  2.0[H]  /  DBili  x   /  AST  1029[H]  /  ALT  938[H]  /  AlkPhos  73  01-04    Creatinine Trend: 4.91<--, 5.93<--, 6.32<--, 6.21<--, 5.03<--, 4.67<--                        9.1    13.29 )-----------( 103      ( 04 Jan 2025 00:56 )             28.0     PT/INR - ( 04 Jan 2025 00:56 )   PT: 33.8 sec;   INR: 2.97 ratio         PTT - ( 04 Jan 2025 00:56 )  PTT:46.1 sec      MICROBIOLOGY:  Peritoneal  01-03-25   Testing in progress  --    polymorphonuclear leukocytes seen  No organisms seen  by cytocentrifuge    .Blood BLOOD  01-02-25   Growth in aerobic and anaerobic bottles: Escherichia coli  --  +ESBL  +CTX-M resistance marker  Growth in aerobic bottle: Gram Negative Rods  Growth in anaerobic bottle: Gram Negative Rods      .Blood BLOOD  01-02-25   Growth in aerobic and anaerobic bottles: Escherichia coli  See previous culture [id number]  Direct identification is available within approximately 3-5  hours either by Blood Panel Multiplexed PCR or Direct  MALDI-TOF. Details: [email or web address]  --  Blood Culture PCR        IMPRESSION:  Stable pulmonary edema and left-sided pleural effusion.       TTE W or WO Ultrasound Enhancing Agent (01.03.25 @ 11:09) >     CONCLUSIONS:      1. Left ventricular systolic function is moderately to severely decreased with anejection fraction of 35 % by 3D. Global left ventricular hypokinesis.   2. Enlarged right ventricular cavity size and moderately to severely reduced right ventricular systolic function.   3. Severe tricuspid regurgitation.   4. Compared to the transthoracic echocardiogram performed on 1/3/2025, there have been no significant interval changes.

## 2025-01-04 NOTE — PROGRESS NOTE ADULT - ASSESSMENT
54M with PMH of HTN, HLD, ESRD on HD MWF via LUE AVF, ascites (requiring repeat paracenteses q4-6wks), NICM with moderate LV dysfunction w/ EF 35-40%() and CAD s/p atherectomy + SALLIE to D1(2024) presents to [facility] transferred from [facility]. Patient initially presented to [facility] ED with shortness of breath. Of note he was recently admitted from - for acute cholecystitis s/p cholecystectomy. In [facility] ED, pt was hypoxic to 86% on RA, trop 1.7K, EKG no new changes, CXR fluid overload. Admitted for AHRF 2/2 fluid overload. Pt received HD on , ,  and . DAPT was resumed, TTE showed improvement in LVEF to 40-45%. Stress test was abnormal with 1mm inferior STD, reversible ischemia in the inferior wall and fixed defects in the lateral, anterior and apical walls with post stress EF of 24%. Pt was then transferred to [facility] for cardiac cath which showed pLAD 70% ISR, mLCx 70%, D1 severe dz. Patient now transferred to [facility] CTS Dr. Rivers for CABG eval. Patient denies any current SOB or chest pain on admission. Otherwise denies headaches, abdominal pain, urinary or bowel changes, fevers, chills, N/V/D, sick contacts.  (24 Dec 2024 05:07)   VSS  CP free   HD via avf  for daily HD pre op- on lovenox 30 qd    HD today continue with present meds. Plan for CABG possibleTVR next week   vss; rsr 55-80; hd today w/ 1 unit prbc; pt to be dialzyed also this  w/ another 1 unit prbc   VSS; RSR 60-80; continue asa/bb/ statin; HD in am - transfuse 1 unit prbc with HD; d/c lovenox after am dose sun    - Pt underwent  C3L free LIMA-LAD; SVG-Diag; SVG-leftPL  Pt given cefuroxime perioperatively   pt extubated   . pt with hypotension and ECHO showing Systolic HF/depressed BIV Function, currently supported with /pressors.  Labs this evening showing transaminitis  1/2 -3 pt hypotensive, febrile and reintubated  Pt pancultured. and started on zosyn- meropenem and gent  pt found to have Gram negative bacteremia    E coli +ESBL bacteremia        A/P  #sepsis  ? source - many options. Pt with abnormal u/a but is a dialysis pt so hard to interpret. Pt with h//o SBP- pt with ascites   s/p  paracentesis 1/3  cultures No Growth to date  - no organisms notes on gram stain  could be from lungs but ? effusion vs infiltrate.  Continue meropenem  if going to change to CVVHD need to increase the meropenem dose    #elevated LFTs  likely shock liver  check hepatitis serology  trend  avoid hepatotoxic meds     #leukopenia  likely from sepsis  improved today      Discussed with CTU attending  - modest clinical improvement    Suggest  STOP Vanco  continue Meropenem  follow up blood cultures

## 2025-01-04 NOTE — PROGRESS NOTE ADULT - ASSESSMENT
54M with PMH of HTN, HLD, ESRD on HD MWF via LUE AVF, ascites (requiring repeat paracenteses q4-6wks), NICM with moderate LV dysfunction w/ EF 35-40%(2023) and CAD s/p atherectomy + SALLIE to D1(4/11/2024) presents to [facility] transferred from [facility] for CABG eval  Nephro on [facility] for HD    ESRD on HD   Regular schedule MWF   Access LUE AVF  Center [facility]  Nephrologist Dr. Bailey  Last HD on 12/24  S/p HD on 12/27 12/29, 12/30 for hyperkalemia and 12/31  2 L PUF On 01/02/2024  However hospital course now complicated by Cardiogenic shock now on multiple pressors,   Spoke with primary team and will continue CRRT   iHD as needed  Keep MAP>65  Renal Diet  Consent in physical chart    Hyperkalemia  Monitor K     HTN  BP controlled   cw home meds  monitor bp     CKD MBD  Can send a PTH  Ca and Phos daily    Anemia  CRISTIANE with HD  Transfuse if hgb <7    CAD with multivessel disease  s/p CABG 12/30  CTICU following 54M with PMH of HTN, HLD, ESRD on HD MWF via LUE AVF, ascites (requiring repeat paracenteses q4-6wks), NICM with moderate LV dysfunction w/ EF 35-40%(2023) and CAD s/p atherectomy + SALLIE to D1(4/11/2024) presents to [facility] transferred from [facility] for CABG eval  Nephro on [facility] for HD    ESRD on HD   Regular schedule MWF   Access LUE AVF  Center [facility]  Nephrologist Dr. Bailey  Last HD on 12/24  S/p HD on 12/27 12/29, 12/30 for hyperkalemia and 12/31  2 L PUF On 01/02/2024  However hospital course now complicated by Cardiogenic shock now on multiple pressors,   Spoke with primary team and will continue CRRT   iHD as needed  Keep MAP>65  Renal Diet  Consent in physical chart    Hyperkalemia  Monitor K, will change dialysate fluid     HTN  currently on pressure support  monitor bp     CKD MBD  Can send a PTH  Ca and Phos daily    Anemia  CRISTIANE with HD  Transfuse if hgb <7    CAD with multivessel disease  s/p CABG 12/30  CTICU following

## 2025-01-04 NOTE — PROGRESS NOTE ADULT - SUBJECTIVE AND OBJECTIVE BOX
Dr. Yousif  Office [phone] (9 am to 5 pm)  Service: [phone] (5pm to 9am)  Chanell RENTERIA      RENAL PROGRESS NOTE: DATE OF SERVICE 01-04-25 @ 17:21    Patient is a 54y old  Male who presents with a chief complaint of CAD (04 Jan 2025 11:43)      Patient seen and examined at bedside. No apparent distress, remained intubated, on vasopressures    VITALS:  T(F): 100.2 (01-04-25 @ 16:00), Max: 101.1 (01-04-25 @ 12:00)  HR: 69 (01-04-25 @ 17:00)  BP: --  RR: 18 (01-04-25 @ 17:00)  SpO2: 100% (01-04-25 @ 17:00)  Wt(kg): --    01-03 @ 07:01  -  01-04 @ 07:00  --------------------------------------------------------  IN: 3872.8 mL / OUT: 4075 mL / NET: -202.2 mL    01-04 @ 07:01  -  01-04 @ 17:21  --------------------------------------------------------  IN: 569.8 mL / OUT: 1646 mL / NET: -1076.2 mL          PHYSICAL EXAM:  Constitutional: Intubated  Neck: No JVD  Respiratory: CTAB, no wheezes, rales or rhonchi  Cardiovascular: S1, S2, RRR  Gastrointestinal: BS+, soft, NT/ND  Extremities: No peripheral edema    Hospital Medications:   MEDICATIONS  (STANDING):  albuterol/ipratropium for Nebulization 3 milliLiter(s) Nebulizer every 6 hours  ascorbic acid 500 milliGRAM(s) Oral two times a day  aspirin  chewable 81 milliGRAM(s) Oral daily  bisacodyl Suppository 10 milliGRAM(s) Rectal once  calcium acetate 667 milliGRAM(s) Oral three times a day with meals  chlorhexidine 0.12% Liquid 15 milliLiter(s) Oral Mucosa every 12 hours  chlorhexidine 2% Cloths 1 Application(s) Topical daily  clopidogrel Tablet 75 milliGRAM(s) Oral daily  CRRT Treatment    <Continuous>  dexMEDEtomidine Infusion 0.5 MICROgram(s)/kG/Hr (10.6 mL/Hr) IV Continuous <Continuous>  dextrose 10%. 1000 milliLiter(s) (10 mL/Hr) IV Continuous <Continuous>  dextrose 50% Injectable 50 milliLiter(s) IV Push every 15 minutes  dextrose 50% Injectable 25 milliLiter(s) IV Push every 15 minutes  DOBUTamine Infusion 7.5 MICROgram(s)/kG/Min (9.57 mL/Hr) IV Continuous <Continuous>  EPINEPHrine    Infusion 0.02 MICROgram(s)/kG/Min (6.38 mL/Hr) IV Continuous <Continuous>  epoetin ignacia (EPOGEN) Injectable 00130 Unit(s) IV Push <User Schedule>  insulin lispro (ADMELOG) corrective regimen sliding scale   SubCutaneous three times a day before meals  insulin lispro (ADMELOG) corrective regimen sliding scale   SubCutaneous at bedtime  lidocaine   4% Patch 2 Patch Transdermal daily  meropenem  IVPB 1000 milliGRAM(s) IV Intermittent every 8 hours  methocarbamol 500 milliGRAM(s) Oral every 8 hours  metoclopramide Injectable 10 milliGRAM(s) IV Push every 8 hours  Nephro-elicia 1 Tablet(s) Oral daily  norepinephrine Infusion 0.08 MICROgram(s)/kG/Min (6.38 mL/Hr) IV Continuous <Continuous>  pantoprazole  Injectable 40 milliGRAM(s) IV Push daily  polyethylene glycol 3350 17 Gram(s) Oral two times a day  PrismaSATE Dialysate BK 0 / 3.5 5000 milliLiter(s) (1000 mL/Hr) CRRT <Continuous>  PrismaSOL Filtration BGK 4 / 2.5 5000 milliLiter(s) (800 mL/Hr) CRRT <Continuous>  PrismaSOL Filtration BGK 4 / 2.5 5000 milliLiter(s) (200 mL/Hr) CRRT <Continuous>  senna 2 Tablet(s) Oral at bedtime  sodium bicarbonate 650 milliGRAM(s) Oral every 8 hours  sodium chloride 0.9%. 1000 milliLiter(s) (10 mL/Hr) IV Continuous <Continuous>  sodium chloride 3%  Inhalation 4 milliLiter(s) Inhalation every 12 hours  vasopressin Infusion 0.03 Unit(s)/Min (4.5 mL/Hr) IV Continuous <Continuous>      LABS:  01-04    135  |  95[L]  |  36[H]  ----------------------------<  151[H]  5.1   |  20[L]  |  3.81[H]    Ca    8.5      04 Jan 2025 14:33  Phos  4.4     01-04  Mg     2.2     01-04    TPro  6.5  /  Alb  3.4  /  TBili  2.5[H]  /  DBili      /  AST  690[H]  /  ALT  867[H]  /  AlkPhos  85  01-04    Creatinine Trend: 3.81 <--, 4.91 <--, 5.93 <--, 6.32 <--, 6.21 <--, 5.03 <--, 4.67 <--, 3.71 <--, 3.36 <--, 5.24 <--, 6.71 <--, 8.86 <--    Albumin: 3.4 g/dL (01-04 @ 14:33)  Phosphorus: 4.4 mg/dL (01-04 @ 14:33)  Albumin: 3.5 g/dL (01-04 @ 00:56)  Phosphorus: 5.4 mg/dL (01-04 @ 00:56)                              9.1    19.46 )-----------( 85       ( 04 Jan 2025 14:33 )             28.2     Urine Studies:  Urinalysis - [01-04-25 @ 14:33]      Color  / Appearance  / SG  / pH       Gluc 151 / Ketone   / Bili  / Urobili        Blood  / Protein  / Leuk Est  / Nitrite       RBC  / WBC  / Hyaline  / Gran  / Sq Epi  / Non Sq Epi  / Bacteria       Iron 29, TIBC 143, %sat 20      [12-19-24 @ 05:00]  Ferritin 904      [12-19-24 @ 05:00]  TSH 4.75      [12-24-24 @ 06:50]    HBsAg Nonreact      [12-19-24 @ 05:00]      RADIOLOGY & ADDITIONAL STUDIES:

## 2025-01-04 NOTE — PROGRESS NOTE ADULT - ASSESSMENT
54M with PMH of HTN, HLD, ESRD on HD MWF via LUE AVF, ascites (requiring repeat paracenteses q4-6wks), NICM with moderate LV dysfunction w/ EF 35-40%() and CAD s/p atherectomy + SALLIE to D1(2024) presents to [facility] transferred from [facility]. Patient initially presented to [facility] ED with shortness of breath. Of note he was recently admitted from - for acute cholecystitis s/p cholecystectomy. In [facility] ED, pt was hypoxic to 86% on RA, trop 1.7K, EKG no new changes, CXR fluid overload. Admitted for AHRF 2/2 fluid overload. Pt received HD on , ,  and . DAPT was resumed, TTE showed improvement in LVEF to 40-45%. Stress test was abnormal with 1mm inferior STD, reversible ischemia in the inferior wall and fixed defects in the lateral, anterior and apical walls with post stress EF of 24%.     Pt was then transferred to [facility] for cardiac cath which showed pLAD 70% ISR, mLCx 70%, D1 severe dz.     Patient now transferred to [facility] CTS Dr. Rivers for CABG eval.        # CAD s/p NSTEMI  Hx CAD s/p PCI LAD   Presented with ADHF elevated troponins  Positive NST  -Cath- pLAD 70% ISR, mLCx 70%, D1 severe dz.   TTE EF 35% Severe TRWas on Plavix-p2y12- 321 been off Plavix since -POD#1-C3L free LIMA-LAD; SVG-Diag; EPW-pmgfCM-Fthphfcfe on  Sinus rhythm,Amio for AF prophylaxis  -OOB off  Sinus rhythm   -POD#3-On /pressors-EF 25-30% RV failure with transaminitis-Sinus Rhythm  -POD#4-Was intubated for hypoxia-gradual hypotension on /pressors had IABP inserted this morning  -POD#5-s/p IABP insertion, sepsis/septic shock due to GNR/ECOLI HD cath placed   Bronched yesterday 1/3 - minimal secretions with rust-tinged sputum   Paracentesis for suspected bacterial peritonitis-No organisms noted  Transfused 1u pbrcs overnight  Remains on inoptropes and pressors with IABP at 1:1    # Acute on Chronic Systolic Heart Failure  EF-35% ,severe TR  Had HD post op  GDMT post op  LVEF improved post bypass but now at 23-30%-BiV dysfunction on /pressors  Vascular to evaluate AVGraft /steal causing RV failure-'' Very low suspicion of steal Sd and AVF causing current clinical state. ''      # Ascites  Hx chronic ascites  Has drainage q4-6 weeks  Last drained -4600mL  ? ascites related to severe TR  Monitor        # ESRD  On HD  HD post op

## 2025-01-04 NOTE — PROGRESS NOTE ADULT - SUBJECTIVE AND OBJECTIVE BOX
Patient seen and examined at the bedside.    Remains critically ill on continuous ICU monitoring.      Brief Summary:  55 yo M with multiple medical problems including CAD s/p PCI in April 2024 s/p CABG x 3 on 12/30/24    24 Hour events:  Intubated overnight   Double lumen central line placed   Bronchoscopy today    Objective:  Vital Signs Last 24 Hrs  T(C): 37.8 (04 Jan 2025 04:00), Max: 38.4 (03 Jan 2025 12:00)  T(F): 100 (04 Jan 2025 04:00), Max: 101.1 (03 Jan 2025 12:00)  HR: 75 (04 Jan 2025 07:00) (68 - 85)  BP: --  BP(mean): --  RR: 14 (04 Jan 2025 06:45) (5 - 24)  SpO2: 99% (04 Jan 2025 07:00) (97% - 100%)    Parameters below as of 04 Jan 2025 05:32  Patient On (Oxygen Delivery Method): ventilator    Mode: AC/ CMV (Assist Control/ Continuous Mandatory Ventilation)  RR (machine): 14  TV (machine): 450  FiO2: 50  PEEP: 8  ITime: 1  MAP: 12  PIP: 28              Physical Exam:   General: Intubated  Neurology: Oriented, following commands  Respiratory: Bilateral breath sounds  CV: Sinus  Abdominal: Soft, Nontender  Extremities: Warm, well-perfused  -------------------------------------------------------------------------------------------------------------------------------    Labs:                        9.1    13.29 )-----------( 103      ( 04 Jan 2025 00:56 )             28.0     01-04    138  |  96  |  42[H]  ----------------------------<  184[H]  5.1   |  17[L]  |  4.91[H]    Ca    8.6      04 Jan 2025 00:56  Phos  5.4     01-04  Mg     2.2     01-04    TPro  6.0  /  Alb  3.5  /  TBili  2.0[H]  /  DBili  x   /  AST  1029[H]  /  ALT  938[H]  /  AlkPhos  73  01-04    LIVER FUNCTIONS - ( 04 Jan 2025 00:56 )  Alb: 3.5 g/dL / Pro: 6.0 g/dL / ALK PHOS: 73 U/L / ALT: 938 U/L / AST: 1029 U/L / GGT: x           PT/INR - ( 04 Jan 2025 00:56 )   PT: 33.8 sec;   INR: 2.97 ratio         PTT - ( 04 Jan 2025 00:56 )  PTT:46.1 sec  ABG - ( 04 Jan 2025 06:06 )  pH, Arterial: 7.35  pH, Blood: x     /  pCO2: 36    /  pO2: 116   / HCO3: 20    / Base Excess: -5.2  /  SaO2: 100.0     -------------------------------------------------------------------------------------------------------------------------------    Assessment:  55 yo M s/p CABG x 3 on 12/30/24    Acute post operative pulmonary insufficiency  Hypotension without shock  Acute blood loss anemia  Transaminitis  Hyperkalemia  Hyperphosphatemia  ESRD on HD    Plan:    ***Neuro***  Postoperative acute pain control with Tylenol, Gabapentin, Robaxin, and prns.  Trazadone nightly.  PT ongoing.    ***Cardiovascular***  At high risk for ongoing hemodynamic instability and cardiac arrhythmias.  s/p CABG x 3  Wean pressors for SBP > 100 mm Hg  Remains on Dobutamine and Epi.  ASA / Plavix resumed.  Monitor chest tube outputs, may be able to remove.  Vascular surgery consulted for fistula evaluation - may be contributing to heart failure.    ***Pulmonary***  Postoperative acute pulmonary insufficiency   Saline nebs for thick secretions.  Encourage incentive spirometry, deep breathing and coughing exercises.    ***GI***  Regular diet  Protonix for stress ulcer prophylaxis.   Bowel regimen.  Transaminitis - should improve on Dobutamine, will trend, avoid hepatotoxins.    ***Renal***  ESRD on HD  Hyperkalemia - HD today and Lokelma given once yesterday.  Hyperphosphatemia - Phoslo    ***ID***  Meropenem   Trend leukocytosis.    ***Endocrine***  Hyperglycemia - Insulin sliding scale    ***Hematology***  Acute blood loss anemia and thrombocytopenia.  No current transfusion indication.  SQ Heparin for DVT prophylaxis.         Patient requires continuous monitoring with bedside rhythm monitoring, pulse oximetry monitoring, and continuous central venous and arterial pressure monitoring; and intermittent blood gas analysis. Care plan discussed with the ICU care team.   Patient remains critical, at risk for life threatening decompensation.    I have spent 30 minutes providing critical care management to this patient.    By signing my name below, I, Gustavo Hudson, attest that this documentation has been prepared under the direction and in the presence of Jen Sierra MD  Electronically signed: Gustavo Hudson, 01-04-25 @ 07:33    I, Jen Sierra MD, personally performed the services described in this documentation. all medical record entries made by the scribe were at my direction and in my presence. I have reviewed the chart and agree that the record reflects my personal performance and is accurate and complete  Electronically signed: Jen Sierra MD Patient seen and examined at the bedside.    Remains critically ill on continuous ICU monitoring.      Brief Summary:  53 yo M with multiple medical problems including CAD s/p PCI in April 2024 s/p CABG x 3 on 12/30/24    24 Hour events:  Intubated overnight   HD cath placed   Bronchoscopy yesterday Bronched yesterday 1/3 - minimal secretions with rust-tinged sputum   Paracentesis for suspected bacterial peritonitis  Repeat TTE Decreased BiV function, Severe TR, and LVEF 35%   Transfused 1u pbrcs overnight, no current transfusion indications  Remains on inoptropes & pressors    Objective:  Vital Signs Last 24 Hrs  T(C): 37.8 (04 Jan 2025 04:00), Max: 38.4 (03 Jan 2025 12:00)  T(F): 100 (04 Jan 2025 04:00), Max: 101.1 (03 Jan 2025 12:00)  HR: 75 (04 Jan 2025 07:00) (68 - 85)  BP: --  BP(mean): --  RR: 14 (04 Jan 2025 06:45) (5 - 24)  SpO2: 99% (04 Jan 2025 07:00) (97% - 100%)    Parameters below as of 04 Jan 2025 05:32  Patient On (Oxygen Delivery Method): ventilator    Mode: AC/ CMV (Assist Control/ Continuous Mandatory Ventilation)  RR (machine): 14  TV (machine): 450  FiO2: 50  PEEP: 8  ITime: 1  MAP: 12  PIP: 28              Physical Exam:   General: Intubated  Neurology: Oriented, following commands. Currently on sedation.  Respiratory: Bilateral breath sounds  CV: Junctional Rhythm accelerated   Abdominal: Soft, Nontender  Extremities: Warm, well-perfused  -------------------------------------------------------------------------------------------------------------------------------    Labs:                        9.1    13.29 )-----------( 103      ( 04 Jan 2025 00:56 )             28.0     01-04    138  |  96  |  42[H]  ----------------------------<  184[H]  5.1   |  17[L]  |  4.91[H]    Ca    8.6      04 Jan 2025 00:56  Phos  5.4     01-04  Mg     2.2     01-04    TPro  6.0  /  Alb  3.5  /  TBili  2.0[H]  /  DBili  x   /  AST  1029[H]  /  ALT  938[H]  /  AlkPhos  73  01-04    LIVER FUNCTIONS - ( 04 Jan 2025 00:56 )  Alb: 3.5 g/dL / Pro: 6.0 g/dL / ALK PHOS: 73 U/L / ALT: 938 U/L / AST: 1029 U/L / GGT: x           PT/INR - ( 04 Jan 2025 00:56 )   PT: 33.8 sec;   INR: 2.97 ratio         PTT - ( 04 Jan 2025 00:56 )  PTT:46.1 sec  ABG - ( 04 Jan 2025 06:06 )  pH, Arterial: 7.35  pH, Blood: x     /  pCO2: 36    /  pO2: 116   / HCO3: 20    / Base Excess: -5.2  /  SaO2: 100.0     -------------------------------------------------------------------------------------------------------------------------------    Assessment:  53 yo M s/p CABG x 3 on 12/30/24    Acute post operative pulmonary insufficiency  Hypotension without shock  Acute blood loss anemia  Transaminitis  Lactic Acidosis   Hyperkalemia  Thrombocytopenia   Hyperphosphatemia  ESRD on HD    Plan:    ***Neuro***  Sedated with Precedex  Postoperative acute pain control with Tylenol, Gabapentin, Robaxin, and prns.  Trazadone nightly.  PT ongoing.    ***Cardiovascular***  At high risk for ongoing hemodynamic instability and cardiac arrhythmias.  s/p CABG x 3  Trend lactate  R.fem IABP 1:1  Wean pressors for SBP > 100 mm Hg  Remains on Dobutamine and Epi.  ASA / Plavix   Monitor chest tube output - M2 CT removed   Vascular surgery consulted for fistula evaluation - may be contributing to heart failure.  Repeat TTE yesterday 1/3: Decreased BiV function, Severe TR    ***Pulmonary***  Alex 20 ppms  Postoperative acute pulmonary insufficiency   Saline nebs for thick secretions.  Encourage incentive spirometry, deep breathing and coughing exercises.  Bronched yesterday 1/3 - minimal secretions with rust-tinged sputum     ***GI***  NPO for now.   Protonix for stress ulcer prophylaxis.   Reglan for gut motility   Bowel regimen.  Transaminitis - should improve on Dobutamine, will trend, avoid hepatotoxins.  Paracentesis yesterday 1/3 for suspected bacterial peritonitis - clear yellow fluid    ***Renal***  ESRD on HD  Hyperkalemia - HD today and Lokelma given once yesterday.  Hyperphosphatemia - Phoslo    ***ID***  Empiric dosing with Meropenem & Vanco  Follow up peritonitis culture   Trend leukocytosis.    ***Endocrine***  DM2 - Insulin sliding scale    ***Hematology***  Acute blood loss anemia and thrombocytopenia.  No current transfusion indication.  Remains off Heparin         Patient requires continuous monitoring with bedside rhythm monitoring, pulse oximetry monitoring, and continuous central venous and arterial pressure monitoring; and intermittent blood gas analysis. Care plan discussed with the ICU care team.   Patient remains critical, at risk for life threatening decompensation.    I have spent 30 minutes providing critical care management to this patient.    By signing my name below, I, Gustavo Hudson, attest that this documentation has been prepared under the direction and in the presence of Jen Sierra MD  Electronically signed: Gustavo Hudson, 01-04-25 @ 07:33    I, Jen Sierra MD, personally performed the services described in this documentation. all medical record entries made by the scribe were at my direction and in my presence. I have reviewed the chart and agree that the record reflects my personal performance and is accurate and complete  Electronically signed: Jen Sierra MD Patient seen and examined at the bedside.    Remains critically ill on continuous ICU monitoring.      Brief Summary:  53 yo M with multiple medical problems including CAD s/p PCI in April 2024 s/p CABG x 3 on 12/30/24 1/2: Intubated for hypoxia & left lung white out s/p awake bronch with removal of copious mucopurulent secretions  1/4: s/p IABP insertion, sepsis/septic shock due to GNR/ECOLI     24 Hour events:  HD cath placed   Bronchoscopy yesterday Bronched yesterday 1/3 - minimal secretions with rust-tinged sputum   Paracentesis for suspected bacterial peritonitis  Repeat TTE Decreased BiV function, Severe TR, and LVEF 35%   Transfused 1u pbrcs overnight, no current transfusion indications  Remains on inoptropes & pressors    Objective:  Vital Signs Last 24 Hrs  T(C): 37.8 (04 Jan 2025 04:00), Max: 38.4 (03 Jan 2025 12:00)  T(F): 100 (04 Jan 2025 04:00), Max: 101.1 (03 Jan 2025 12:00)  HR: 75 (04 Jan 2025 07:00) (68 - 85)  BP: --  BP(mean): --  RR: 14 (04 Jan 2025 06:45) (5 - 24)  SpO2: 99% (04 Jan 2025 07:00) (97% - 100%)    Parameters below as of 04 Jan 2025 05:32  Patient On (Oxygen Delivery Method): ventilator    Mode: AC/ CMV (Assist Control/ Continuous Mandatory Ventilation)  RR (machine): 14  TV (machine): 450  FiO2: 50  PEEP: 8  ITime: 1  MAP: 12  PIP: 28              Physical Exam:   General: Intubated  Neurology: Oriented, following commands. Currently on sedation.  Respiratory: Bilateral breath sounds  CV: Junctional Rhythm accelerated   Abdominal: Soft, Nontender  Extremities: Warm, well-perfused  -------------------------------------------------------------------------------------------------------------------------------    Labs:                        9.1    13.29 )-----------( 103      ( 04 Jan 2025 00:56 )             28.0     01-04    138  |  96  |  42[H]  ----------------------------<  184[H]  5.1   |  17[L]  |  4.91[H]    Ca    8.6      04 Jan 2025 00:56  Phos  5.4     01-04  Mg     2.2     01-04    TPro  6.0  /  Alb  3.5  /  TBili  2.0[H]  /  DBili  x   /  AST  1029[H]  /  ALT  938[H]  /  AlkPhos  73  01-04    LIVER FUNCTIONS - ( 04 Jan 2025 00:56 )  Alb: 3.5 g/dL / Pro: 6.0 g/dL / ALK PHOS: 73 U/L / ALT: 938 U/L / AST: 1029 U/L / GGT: x           PT/INR - ( 04 Jan 2025 00:56 )   PT: 33.8 sec;   INR: 2.97 ratio         PTT - ( 04 Jan 2025 00:56 )  PTT:46.1 sec  ABG - ( 04 Jan 2025 06:06 )  pH, Arterial: 7.35  pH, Blood: x     /  pCO2: 36    /  pO2: 116   / HCO3: 20    / Base Excess: -5.2  /  SaO2: 100.0     -------------------------------------------------------------------------------------------------------------------------------    Assessment:  53 yo M s/p CABG x 3 on 12/30/24    Acute post operative pulmonary insufficiency  Hypotension without shock  Acute blood loss anemia  Transaminitis  Lactic Acidosis   Hyperkalemia  Thrombocytopenia   Hyperphosphatemia  ESRD on HD    Plan:    ***Neuro***  Sedated with Precedex  Postoperative acute pain control with Tylenol, Gabapentin, Robaxin, and prns.  Trazadone nightly.    ***Cardiovascular***  At high risk for ongoing hemodynamic instability and cardiac arrhythmias.  s/p CABG x 3  Trend lactate  R.fem IABP 1:1  Wean pressors for SBP > 100 mm Hg  Remains on Dobutamine and Epi.  ASA / Plavix   Monitor chest tube output - M2 CT removed   Vascular surgery consulted for fistula evaluation - may be contributing to heart failure.  Repeat TTE yesterday 1/3: Decreased BiV function, Severe TR    ***Pulmonary***  Alex 20 ppms  Postoperative acute pulmonary insufficiency   Saline nebs for thick secretions.  Encourage incentive spirometry, deep breathing and coughing exercises.  Bronched yesterday 1/3 - minimal secretions with rust-tinged sputum     ***GI***  NPO for now.   Protonix for stress ulcer prophylaxis.   Reglan for gut motility   Bowel regimen.  Transaminitis - should improve on Dobutamine, will trend, avoid hepatotoxins.  Paracentesis yesterday 1/3 for suspected bacterial peritonitis - clear yellow fluid    ***Renal***  ESRD on HD  Hyperkalemia - HD today and Lokelma given once yesterday.  Hyperphosphatemia - Phoslo    ***ID***  Sepsis/septic shock due to GNR/ECOLI, continue empiric dosing with Meropenem & Vanco  Follow up all cultures  Trend leukocytosis.    ***Endocrine***  DM2 - Insulin sliding scale    ***Hematology***  Acute blood loss anemia and thrombocytopenia.  No current transfusion indication.  Remains off Heparin         Patient requires continuous monitoring with bedside rhythm monitoring, pulse oximetry monitoring, and continuous central venous and arterial pressure monitoring; and intermittent blood gas analysis. Care plan discussed with the ICU care team.   Patient remains critical, at risk for life threatening decompensation.    I have spent 30 minutes providing critical care management to this patient.    By signing my name below, I, Gustavo Hudson, attest that this documentation has been prepared under the direction and in the presence of Jen Sierra MD  Electronically signed: Gustavo Hudson, 01-04-25 @ 07:33    TOSIN, Jen Sierra MD, personally performed the services described in this documentation. all medical record entries made by the scribe were at my direction and in my presence. I have reviewed the chart and agree that the record reflects my personal performance and is accurate and complete  Electronically signed: Jen Sierra MD Patient seen and examined at the bedside.    Remains critically ill on continuous ICU monitoring.      Brief Summary:  55 yo M with multiple medical problems including CAD s/p PCI in April 2024 s/p CABG x 3 on 12/30/24 1/2: Intubated for hypoxia & left lung white out s/p awake bronch with removal of copious mucopurulent secretions  1/4: s/p IABP insertion, sepsis/septic shock due to GNR/ECOLI     24 Hour events:  HD cath placed   Bronched yesterday 1/3 - minimal secretions with rust-tinged sputum   Paracentesis for suspected bacterial peritonitis  Transfused 1u pbrcs overnight  Remains on inoptropes and pressors with IABP at 1:1      Objective:  Vital Signs Last 24 Hrs  T(C): 37.8 (04 Jan 2025 04:00), Max: 38.4 (03 Jan 2025 12:00)  T(F): 100 (04 Jan 2025 04:00), Max: 101.1 (03 Jan 2025 12:00)  HR: 75 (04 Jan 2025 07:00) (68 - 85)  BP: --  BP(mean): --  RR: 14 (04 Jan 2025 06:45) (5 - 24)  SpO2: 99% (04 Jan 2025 07:00) (97% - 100%)    Parameters below as of 04 Jan 2025 05:32  Patient On (Oxygen Delivery Method): ventilator    Mode: AC/ CMV (Assist Control/ Continuous Mandatory Ventilation)  RR (machine): 14  TV (machine): 450  FiO2: 50  PEEP: 8  ITime: 1  MAP: 12  PIP: 28              Physical Exam:   General: Intubated  Neurology: Oriented, following commands.   Respiratory: Bilateral breath sounds  CV: Regular  Abdominal: Soft, Nontender  Extremities: Warm, well-perfused  IABP site ok.    -------------------------------------------------------------------------------------------------------------------------------    Labs:                        9.1    13.29 )-----------( 103      ( 04 Jan 2025 00:56 )             28.0     01-04    138  |  96  |  42[H]  ----------------------------<  184[H]  5.1   |  17[L]  |  4.91[H]    Ca    8.6      04 Jan 2025 00:56  Phos  5.4     01-04  Mg     2.2     01-04    TPro  6.0  /  Alb  3.5  /  TBili  2.0[H]  /  DBili  x   /  AST  1029[H]  /  ALT  938[H]  /  AlkPhos  73  01-04    LIVER FUNCTIONS - ( 04 Jan 2025 00:56 )  Alb: 3.5 g/dL / Pro: 6.0 g/dL / ALK PHOS: 73 U/L / ALT: 938 U/L / AST: 1029 U/L / GGT: x           PT/INR - ( 04 Jan 2025 00:56 )   PT: 33.8 sec;   INR: 2.97 ratio         PTT - ( 04 Jan 2025 00:56 )  PTT:46.1 sec  ABG - ( 04 Jan 2025 06:06 )  pH, Arterial: 7.35  pH, Blood: x     /  pCO2: 36    /  pO2: 116   / HCO3: 20    / Base Excess: -5.2  /  SaO2: 100.0     -------------------------------------------------------------------------------------------------------------------------------    Assessment:  55 yo M s/p CABG x 3 on 12/30/24    Postop acute respiratory failure  Cardiogenic and Septic shock  Acute blood loss anemia  Transaminitis  Lactic Acidosis   Hyperkalemia  Thrombocytopenia   Hyperphosphatemia  ESRD on HD    Plan:    ***Neuro***  Sedated with Precedex  Postoperative acute pain control with Tylenol, Gabapentin, Robaxin, and prns.  Trazadone nightly.    ***Cardiovascular***  s/p CABG x 3  Trend lactate  R.fem IABP 1:1  Wean pressors for MAP 65 mm Hg  Remains on Dobutamine and Epinephrine, will wean Epinephrine.  ASA / Plavix   Monitor chest tube output   Vascular surgery consulted for fistula evaluation - may be contributing to heart failure.  Repeat TTE yesterday 1/3: Decreased BiV function, Severe TR    ***Pulmonary***  Alex 20 ppms  Postoperative acute respiratory failure.  Continue vent support.    ***GI***  Will start trickle tube feeds.  Protonix for stress ulcer prophylaxis.   Reglan for gut motility   Bowel regimen.  Transaminitis - will trend, avoid hepatotoxins.  Trend bilirubin.  Paracentesis yesterday 1/3 for suspected bacterial peritonitis - clear yellow fluid    ***Renal***  ESRD on HD  Hyperkalemia - CRRT  Hyperphosphatemia - Phoslo, follow Calcium levels.    ***ID***  Sepsis/septic shock due to ESBL E coli - Vanco, Merrem, and Gentamicin x 1  Follow up all cultures  Trend leukocytosis.    ***Endocrine***  D10 for hypoglycemia.    ***Hematology***  Acute blood loss anemia and thrombocytopenia.  No current transfusion indication.  Off Heparin, will send a TEG in setting of elevated INR.      Care plan discussed with the ICU care team.   Patient remains critical, at risk for life threatening decompensation.    I have spent 30 minutes providing critical care management to this patient.    By signing my name below, I, Gustavo Hudson, attest that this documentation has been prepared under the direction and in the presence of Jen Sierra MD  Electronically signed: Gustavo Hudson, 01-04-25 @ 07:33    I, Jen Sierra MD, personally performed the services described in this documentation. all medical record entries made by the scribe were at my direction and in my presence. I have reviewed the chart and agree that the record reflects my personal performance and is accurate and complete  Electronically signed: Jen Sierra MD Patient seen and examined at the bedside.    Remains critically ill on continuous ICU monitoring.      Brief Summary:  55 yo M with multiple medical problems including CAD s/p PCI in April 2024 s/p CABG x 3 on 12/30/24 1/2: Intubated for hypoxia & left lung white out s/p awake bronch with removal of copious mucopurulent secretions  1/4: s/p IABP insertion, sepsis/septic shock due to GNR/ECOLI     24 Hour events:  HD cath placed   Bronched yesterday 1/3 - minimal secretions with rust-tinged sputum   Paracentesis for suspected bacterial peritonitis  Transfused 1u pbrcs overnight  Remains on inoptropes and pressors with IABP at 1:1      Objective:  Vital Signs Last 24 Hrs  T(C): 37.8 (04 Jan 2025 04:00), Max: 38.4 (03 Jan 2025 12:00)  T(F): 100 (04 Jan 2025 04:00), Max: 101.1 (03 Jan 2025 12:00)  HR: 75 (04 Jan 2025 07:00) (68 - 85)  BP: --  BP(mean): --  RR: 14 (04 Jan 2025 06:45) (5 - 24)  SpO2: 99% (04 Jan 2025 07:00) (97% - 100%)    Parameters below as of 04 Jan 2025 05:32  Patient On (Oxygen Delivery Method): ventilator    Mode: AC/ CMV (Assist Control/ Continuous Mandatory Ventilation)  RR (machine): 14  TV (machine): 450  FiO2: 50  PEEP: 8  ITime: 1  MAP: 12  PIP: 28              Physical Exam:   General: Intubated  Neurology: Oriented, following commands.   Respiratory: Bilateral breath sounds  CV: Regular  Abdominal: Soft, Nontender  Extremities: Warm, well-perfused  IABP site ok.    -------------------------------------------------------------------------------------------------------------------------------    Labs:                        9.1    13.29 )-----------( 103      ( 04 Jan 2025 00:56 )             28.0     01-04    138  |  96  |  42[H]  ----------------------------<  184[H]  5.1   |  17[L]  |  4.91[H]    Ca    8.6      04 Jan 2025 00:56  Phos  5.4     01-04  Mg     2.2     01-04    TPro  6.0  /  Alb  3.5  /  TBili  2.0[H]  /  DBili  x   /  AST  1029[H]  /  ALT  938[H]  /  AlkPhos  73  01-04    LIVER FUNCTIONS - ( 04 Jan 2025 00:56 )  Alb: 3.5 g/dL / Pro: 6.0 g/dL / ALK PHOS: 73 U/L / ALT: 938 U/L / AST: 1029 U/L / GGT: x           PT/INR - ( 04 Jan 2025 00:56 )   PT: 33.8 sec;   INR: 2.97 ratio         PTT - ( 04 Jan 2025 00:56 )  PTT:46.1 sec  ABG - ( 04 Jan 2025 06:06 )  pH, Arterial: 7.35  pH, Blood: x     /  pCO2: 36    /  pO2: 116   / HCO3: 20    / Base Excess: -5.2  /  SaO2: 100.0     -------------------------------------------------------------------------------------------------------------------------------    Assessment:  55 yo M s/p CABG x 3 on 12/30/24    Postop acute respiratory failure  Cardiogenic and Septic shock  Acute blood loss anemia  Transaminitis  Lactic Acidosis   Hyperkalemia  Thrombocytopenia   Hyperphosphatemia  ESRD on HD    Plan:    ***Neuro***  Sedated with Precedex  Postoperative acute pain control with Tylenol, Gabapentin, Robaxin, and prns.    ***Cardiovascular***  s/p CABG x 3  Trend lactate  R. fem IABP 1:1  Wean pressors for MAP 65 mm Hg  Remains on Dobutamine and Epinephrine, will wean Epinephrine.  ASA / Plavix   Monitor chest tube output   Vascular surgery consulted for fistula evaluation - may be contributing to heart failure.  Repeat TTE yesterday 1/3: Decreased BiV function, Severe TR    ***Pulmonary***  Alex 20 ppms  Postoperative acute respiratory failure.  Continue vent support.    ***GI***  Will start trickle tube feeds.  Protonix for stress ulcer prophylaxis.   Reglan for gut motility   Bowel regimen.  Transaminitis - will trend, avoid hepatotoxins.  Trend bilirubin.  Paracentesis yesterday 1/3 for suspected bacterial peritonitis - clear yellow fluid    ***Renal***  ESRD on HD  Hyperkalemia - CRRT  Hyperphosphatemia - Phoslo, follow Calcium levels.    ***ID***  Sepsis/septic shock due to ESBL E coli - Continue Merrem per ID recs. Will stop Vanco.  Follow up all cultures  Trend leukocytosis.    ***Endocrine***  D10 for hypoglycemia - will wean to off as trickle TF start.    ***Hematology***  Acute blood loss anemia and thrombocytopenia.  No current transfusion indication.  Off Heparin, will send a TEG in setting of elevated INR.      Care plan discussed with the ICU care team.   Patient remains critical, at risk for life threatening decompensation.    I have spent 50 minutes providing critical care management to this patient.    By signing my name below, I, Gustavo Hudson, attest that this documentation has been prepared under the direction and in the presence of Jen Sierra MD  Electronically signed: Gustavo Hudson, 01-04-25 @ 07:33    I, Jen Sierra MD, personally performed the services described in this documentation. all medical record entries made by the scribe were at my direction and in my presence. I have reviewed the chart and agree that the record reflects my personal performance and is accurate and complete  Electronically signed: Jen Sierra MD

## 2025-01-04 NOTE — PROGRESS NOTE ADULT - SUBJECTIVE AND OBJECTIVE BOX
Patient seen and examined at the bedside.    Remained critically ill on continuous ICU monitoring.    OBJECTIVE:  Vital Signs Last 24 Hrs  T(C): 37.9 (04 Jan 2025 16:00), Max: 38.4 (04 Jan 2025 12:00)  T(F): 100.2 (04 Jan 2025 16:00), Max: 101.1 (04 Jan 2025 12:00)  HR: 69 (04 Jan 2025 19:15) (68 - 78)  BP: --  BP(mean): --  RR: 19 (04 Jan 2025 19:15) (4 - 36)  SpO2: 100% (04 Jan 2025 19:15) (86% - 100%)    Parameters below as of 04 Jan 2025 17:40  Patient On (Oxygen Delivery Method): ventilator          Physical Exam:   General: NAD   Neurology: Sedated  Eyes: bilateral pupils equal and reactive   ENT/Neck: Neck supple, trachea midline, No JVD   Respiratory: Clear bilaterally   CV: S1S2, no murmurs        [x] Sternal dressing, [x] L. Pleural CT         [x] Junctional rhythm   Abdominal: Soft, NT, ND +BS   Extremities: 1-2+ pedal edema noted, + peripheral pulses   Skin: No Rashes, Hematoma, Ecchymosis                           Assessment:  54M with PMH of HTN, HLD, ESRD on HD MWF via LUE AVF, ascites (requiring repeat paracenteses q4-6wks), NICM with moderate LV dysfunction w/ EF 35-40%(2023) and CAD s/p atherectomy + SALLIE to D1(4/11/2024) presents to [facility] transferred from [facility].    Multi-CAD s/p C3L on 12/30/24  Hemodynamic instability  Hypovolemia  Lactic acidosis  Leukocytosis  Post op respiratory insufficiency  Acute blood loss anemia  Thrombocytopenia  Plan:   ***Neuro***  [x] Sedated with [x] Precedex  Post operative neuro assessment   Tylenol, Robaxin, PRN Fentanyl, and PRN Oxycodone for pain management.    ***Cardiovascular***  Invasive hemodynamic monitoring, assess perfusion indices   JR / CVP 10 / MAP 71 / Hct 28.2% / Lactate 2.5  [x] Levophed - 0.08 mcg/kg/min [x] Vasopressin - 0.03 Unit(s)/Min [x] Epinephrine - weaned off [x] Dobutamine - 7.5 mcg/kg/min  [x]  L. Fem IABP 1:1  Continuos reassessment of hemodynamics   Monitor chest tube output. -- Removed Mediastinal CTx2 (1/2/25)  [x]  ASA [x]  Plavix   Serial EKG and cardiac enzymes   Bronched (1/2) during the day and again yesterday morning (1/3)  Repeat ECHO performed (1/3) -- Decreased BiV function, Severe TR, and LVEF 35%     ***Pulmonary***  [x] Alex 20 PPM  Post op vent management   Titration of FiO2 and PEEP, follow SpO2, CXR, blood gasses   Continue bronchodilators as needed.  Bronched today (1/3) - fair amount of thick, yellow secretions  CXR stable, Repeat CXR appears the same    Mode: AC/ CMV (Assist Control/ Continuous Mandatory Ventilation)  RR (machine): 14  TV (machine): 450  FiO2: 50  PEEP: 8  ITime: 1  MAP: 12  PIP: 25      ***GI***  [x] NPO, Start Trickle Feeds at 10p  [x] Protonix   Bowel regimen with Miralax and Senna     ***Renal***  [x] ESRD on HD   Continue to monitor I/Os, BUN/Creatinine.   Replete lytes PRN  Vigil present  CVVH on since 3p yesterday (1/3), currently net even   Nephro-Lisette for renal support    ***ID***  Empiric Meropenem  Send Vanc level and f/u results   Paracentesis performed today (1/3); + E. Coli Cx in Sputum and Blood    ***Endocrine***  [x] DM2 : HbA1c 5.6%                - [x] ISS             - Need tight glycemic control to prevent wound infection.                  Patient requires continuous monitoring with bedside rhythm monitoring, pulse oximetry monitoring, and continuous central venous and arterial pressure monitoring; and intermittent blood gas analysis. Care plan discussed with the ICU care team.   Patient remained critical, at risk for life threatening decompensation.    I have spent 55 minutes providing critical care management to this patient.    By signing my name below, I, Mel Castro, attest that this documentation has been prepared under the direction and in the presence of MYRA Gil  Electronically signed: Evelio Solitario, 01-04-25 @ 19:31    I, MYRA Gil, personally performed the services described in this documentation. all medical record entries made by the scribe were at my direction and in my presence. I have reviewed the chart and agree that the record reflects my personal performance and is accurate and complete  Electronically signed: MYRA Gil Patient seen and examined at the bedside.    Remained critically ill on continuous ICU monitoring.    OBJECTIVE:  Vital Signs Last 24 Hrs  T(C): 37.9 (04 Jan 2025 16:00), Max: 38.4 (04 Jan 2025 12:00)  T(F): 100.2 (04 Jan 2025 16:00), Max: 101.1 (04 Jan 2025 12:00)  HR: 69 (04 Jan 2025 19:15) (68 - 78)  RR: 19 (04 Jan 2025 19:15) (4 - 36)  SpO2: 100% (04 Jan 2025 19:15) (86% - 100%)    Parameters below as of 04 Jan 2025 17:40  Patient On (Oxygen Delivery Method): ventilator    Physical Exam:   General: NAD   Neurology: Sedated  Eyes: bilateral pupils equal and reactive   ENT/Neck: Neck supple, trachea midline, No JVD   Respiratory: Clear bilaterally   CV: S1S2, no murmurs        [x] Sternal dressing, [x] L. Pleural CT         NSR  Abdominal: Soft, NT, ND +BS   Extremities: 1-2+ pedal edema noted, + peripheral pulses   Skin: No Rashes, Hematoma, Ecchymosis                           Assessment:  54M with PMH of HTN, HLD, ESRD on HD MWF via LUE AVF, ascites (requiring repeat paracenteses q4-6wks), NICM with moderate LV dysfunction w/ EF 35-40%(2023) and CAD s/p atherectomy + SALLIE to D1(4/11/2024) presents to [facility] transferred from [facility].    Multi-CAD s/p C3L on 12/30/24  Hemodynamic instability  Hypovolemia  Lactic acidosis  Leukocytosis  Post op respiratory insufficiency  Acute blood loss anemia  Thrombocytopenia  Plan:   ***Neuro***  [x] Sedated with [x] Precedex  Post operative neuro assessment   Tylenol, Robaxin, PRN Fentanyl, and PRN Oxycodone for pain management.    ***Cardiovascular***  Invasive hemodynamic monitoring, assess perfusion indices   JR / CVP 10 / MAP 71 / Hct 28.2% / Lactate 2.5  [x] Levophed - 0.08 mcg/kg/min [x] Vasopressin - 0.03 Unit(s)/Min [x] Epinephrine - weaned off [x] Dobutamine - 7.5 mcg/kg/min  [x]  L. Fem IABP 1:1  Continuos reassessment of hemodynamics   Monitor chest tube output. -- Removed Mediastinal CTx2 (1/2/25)  [x]  ASA [x]  Plavix   Serial EKG and cardiac enzymes   Repeat ECHO performed (1/3) -- Decreased BiV function, Severe TR, and LVEF 35%     ***Pulmonary***  [x] Alex 20 PPM  Post op vent management   Titration of FiO2 and PEEP, follow SpO2, CXR, blood gasses   Continue bronchodilators as needed.  Bronched today (1/4) - fair amount of thick, yellow secretions  CXR stable, Repeat CXR appears the same    Mode: AC/ CMV (Assist Control/ Continuous Mandatory Ventilation)  RR (machine): 14  TV (machine): 450  FiO2: 50  PEEP: 8  ITime: 1  MAP: 12  PIP: 25      ***GI***  [x] NPO, Start Trickle Feeds at 10p  [x] Protonix   Bowel regimen with Miralax and Senna     ***Renal***  [x] ESRD on HD   Continue to monitor I/Os, BUN/Creatinine.   Replete lytes PRN  Vigil present  CVVH on since 3p yesterday (1/3), currently net even (goal net negative 100cc/hr)   Nephro-Lisette for renal support    ***ID***  Empiric Meropenem  Send Vanc level and f/u results   Paracentesis performed (1/3);   + E. Coli Cx in Sputum and Blood - ESBL - sensitive to meropenem     ***Endocrine***  [x] DM2 : HbA1c 5.6%                - [x] ISS             - Need tight glycemic control to prevent wound infection.        Patient requires continuous monitoring with bedside rhythm monitoring, pulse oximetry monitoring, and continuous central venous and arterial pressure monitoring; and intermittent blood gas analysis. Care plan discussed with the ICU care team.   Patient remained critical, at risk for life threatening decompensation.    I have spent 55 minutes providing critical care management to this patient.    By signing my name below, IMel, attest that this documentation has been prepared under the direction and in the presence of MYRA Gil  Electronically signed: Evelio Solitario, 01-04-25 @ 19:31    I, MYRA Gil, personally performed the services described in this documentation. all medical record entries made by the scribe were at my direction and in my presence. I have reviewed the chart and agree that the record reflects my personal performance and is accurate and complete  Electronically signed: MYRA Gil

## 2025-01-05 LAB
-  AMPICILLIN/SULBACTAM: SIGNIFICANT CHANGE UP
-  AMPICILLIN: SIGNIFICANT CHANGE UP
-  AZTREONAM: SIGNIFICANT CHANGE UP
-  CEFAZOLIN: SIGNIFICANT CHANGE UP
-  CEFEPIME: SIGNIFICANT CHANGE UP
-  CEFTRIAXONE: SIGNIFICANT CHANGE UP
-  CIPROFLOXACIN: SIGNIFICANT CHANGE UP
-  ERTAPENEM: SIGNIFICANT CHANGE UP
-  GENTAMICIN: SIGNIFICANT CHANGE UP
-  IMIPENEM: SIGNIFICANT CHANGE UP
-  MEROPENEM: SIGNIFICANT CHANGE UP
-  PIPERACILLIN/TAZOBACTAM: SIGNIFICANT CHANGE UP
-  TOBRAMYCIN: SIGNIFICANT CHANGE UP
-  TRIMETHOPRIM/SULFAMETHOXAZOLE: SIGNIFICANT CHANGE UP
ALBUMIN SERPL ELPH-MCNC: 3.3 G/DL — SIGNIFICANT CHANGE UP (ref 3.3–5)
ALP SERPL-CCNC: 108 U/L — SIGNIFICANT CHANGE UP (ref 40–120)
ALT FLD-CCNC: 787 U/L — HIGH (ref 10–45)
ANION GAP SERPL CALC-SCNC: 20 MMOL/L — HIGH (ref 5–17)
APTT BLD: 41.1 SEC — HIGH (ref 24.5–35.6)
AST SERPL-CCNC: 560 U/L — HIGH (ref 10–40)
BASE EXCESS BLDV CALC-SCNC: -5.1 MMOL/L — LOW (ref -2–3)
BILIRUB SERPL-MCNC: 2.6 MG/DL — HIGH (ref 0.2–1.2)
BLD GP AB SCN SERPL QL: NEGATIVE — SIGNIFICANT CHANGE UP
BUN SERPL-MCNC: 33 MG/DL — HIGH (ref 7–23)
CALCIUM SERPL-MCNC: 8.8 MG/DL — SIGNIFICANT CHANGE UP (ref 8.4–10.5)
CO2 BLDV-SCNC: 23 MMOL/L — SIGNIFICANT CHANGE UP (ref 22–26)
CO2 SERPL-SCNC: 18 MMOL/L — LOW (ref 22–31)
CREAT SERPL-MCNC: 3.23 MG/DL — HIGH (ref 0.5–1.3)
CULTURE RESULTS: ABNORMAL
EGFR: 22 ML/MIN/1.73M2 — LOW
EGFR: 22 ML/MIN/1.73M2 — LOW
GAS PNL BLDA: SIGNIFICANT CHANGE UP
GAS PNL BLDV: SIGNIFICANT CHANGE UP
GLUCOSE SERPL-MCNC: 131 MG/DL — HIGH (ref 70–99)
HCO3 BLDV-SCNC: 22 MMOL/L — SIGNIFICANT CHANGE UP (ref 22–29)
HGB BLD-MCNC: 9.1 G/DL — LOW (ref 13–17)
HOROWITZ INDEX BLDV+IHG-RTO: 50 — SIGNIFICANT CHANGE UP
INR BLD: 2.09 RATIO — HIGH (ref 0.85–1.16)
MAGNESIUM SERPL-MCNC: 2.2 MG/DL — SIGNIFICANT CHANGE UP (ref 1.6–2.6)
MCHC RBC-ENTMCNC: 30.6 PG — SIGNIFICANT CHANGE UP (ref 27–34)
MCHC RBC-ENTMCNC: 31.7 G/DL — LOW (ref 32–36)
MCV RBC AUTO: 96.6 FL — SIGNIFICANT CHANGE UP (ref 80–100)
METHOD TYPE: SIGNIFICANT CHANGE UP
NRBC # BLD: 0 /100 WBCS — SIGNIFICANT CHANGE UP (ref 0–0)
NRBC BLD-RTO: 0 /100 WBCS — SIGNIFICANT CHANGE UP (ref 0–0)
ORGANISM # SPEC MICROSCOPIC CNT: ABNORMAL
ORGANISM # SPEC MICROSCOPIC CNT: ABNORMAL
PCO2 BLDV: 46 MMHG — SIGNIFICANT CHANGE UP (ref 42–55)
PH BLDV: 7.28 — LOW (ref 7.32–7.43)
PHOSPHATE SERPL-MCNC: 4.4 MG/DL — SIGNIFICANT CHANGE UP (ref 2.5–4.5)
PLATELET # BLD AUTO: 84 K/UL — LOW (ref 150–400)
PO2 BLDV: 55 MMHG — HIGH (ref 25–45)
POTASSIUM SERPL-MCNC: 4.6 MMOL/L — SIGNIFICANT CHANGE UP (ref 3.5–5.3)
POTASSIUM SERPL-SCNC: 4.6 MMOL/L — SIGNIFICANT CHANGE UP (ref 3.5–5.3)
PROCALCITONIN SERPL-MCNC: 51.33 NG/ML — HIGH (ref 0.02–0.1)
PROT SERPL-MCNC: 6.6 G/DL — SIGNIFICANT CHANGE UP (ref 6–8.3)
PROTHROM AB SERPL-ACNC: 23.6 SEC — HIGH (ref 9.9–13.4)
RBC # BLD: 2.97 M/UL — LOW (ref 4.2–5.8)
RBC # FLD: 19.3 % — HIGH (ref 10.3–14.5)
RH IG SCN BLD-IMP: POSITIVE — SIGNIFICANT CHANGE UP
SAO2 % BLDV: 86.7 % — SIGNIFICANT CHANGE UP (ref 67–88)
SPECIMEN SOURCE: SIGNIFICANT CHANGE UP
VANCOMYCIN TROUGH SERPL-MCNC: 12.4 UG/ML — SIGNIFICANT CHANGE UP (ref 10–20)
WBC # BLD: 24.3 K/UL — HIGH (ref 3.8–10.5)

## 2025-01-05 PROCEDURE — 71045 X-RAY EXAM CHEST 1 VIEW: CPT | Mod: 26

## 2025-01-05 PROCEDURE — 93321 DOPPLER ECHO F-UP/LMTD STD: CPT | Mod: 26

## 2025-01-05 PROCEDURE — 93325 DOPPLER ECHO COLOR FLOW MAPG: CPT | Mod: 26

## 2025-01-05 PROCEDURE — 93010 ELECTROCARDIOGRAM REPORT: CPT

## 2025-01-05 PROCEDURE — 99292 CRITICAL CARE ADDL 30 MIN: CPT

## 2025-01-05 PROCEDURE — 93308 TTE F-UP OR LMTD: CPT | Mod: 26

## 2025-01-05 PROCEDURE — 99291 CRITICAL CARE FIRST HOUR: CPT

## 2025-01-05 RX ORDER — MAGNESIUM SULFATE 500 MG/ML
1 SYRINGE (ML) INJECTION ONCE
Refills: 0 | Status: COMPLETED | OUTPATIENT
Start: 2025-01-05 | End: 2025-01-05

## 2025-01-05 RX ORDER — AMIODARONE HYDROCHLORIDE 50 MG/ML
150 INJECTION, SOLUTION INTRAVENOUS ONCE
Refills: 0 | Status: COMPLETED | OUTPATIENT
Start: 2025-01-05 | End: 2025-01-05

## 2025-01-05 RX ORDER — COLCHICINE 0.6 MG/1
0.3 TABLET, FILM COATED ORAL DAILY
Refills: 0 | Status: DISCONTINUED | OUTPATIENT
Start: 2025-01-05 | End: 2025-01-09

## 2025-01-05 RX ORDER — AMIODARONE HYDROCHLORIDE 50 MG/ML
0.5 INJECTION, SOLUTION INTRAVENOUS
Qty: 450 | Refills: 0 | Status: COMPLETED | OUTPATIENT
Start: 2025-01-05 | End: 2025-01-06

## 2025-01-05 RX ADMIN — IPRATROPIUM BROMIDE AND ALBUTEROL SULFATE 3 MILLILITER(S): .5; 2.5 SOLUTION RESPIRATORY (INHALATION) at 23:19

## 2025-01-05 RX ADMIN — Medication 50 MILLIEQUIVALENT(S): at 14:27

## 2025-01-05 RX ADMIN — Medication 81 MILLIGRAM(S): at 13:55

## 2025-01-05 RX ADMIN — Medication 667 MILLIGRAM(S): at 08:56

## 2025-01-05 RX ADMIN — Medication 650 MILLIGRAM(S): at 22:00

## 2025-01-05 RX ADMIN — DEXMEDETOMIDINE HYDROCHLORIDE IN SODIUM CHLORIDE 10.6 MICROGRAM(S)/KG/HR: 4 INJECTION INTRAVENOUS at 19:20

## 2025-01-05 RX ADMIN — Medication 10 MILLIGRAM(S): at 05:30

## 2025-01-05 RX ADMIN — Medication 25 MICROGRAM(S): at 17:32

## 2025-01-05 RX ADMIN — Medication 100 GRAM(S): at 12:48

## 2025-01-05 RX ADMIN — AMIODARONE HYDROCHLORIDE 600 MILLIGRAM(S): 50 INJECTION, SOLUTION INTRAVENOUS at 13:54

## 2025-01-05 RX ADMIN — Medication 15 MILLILITER(S): at 05:29

## 2025-01-05 RX ADMIN — MEROPENEM 100 MILLIGRAM(S): 1 INJECTION INTRAVENOUS at 13:54

## 2025-01-05 RX ADMIN — Medication 40 MILLIGRAM(S): at 13:54

## 2025-01-05 RX ADMIN — COLCHICINE 0.3 MILLIGRAM(S): 0.6 TABLET, FILM COATED ORAL at 13:54

## 2025-01-05 RX ADMIN — IPRATROPIUM BROMIDE AND ALBUTEROL SULFATE 3 MILLILITER(S): .5; 2.5 SOLUTION RESPIRATORY (INHALATION) at 05:07

## 2025-01-05 RX ADMIN — IPRATROPIUM BROMIDE AND ALBUTEROL SULFATE 3 MILLILITER(S): .5; 2.5 SOLUTION RESPIRATORY (INHALATION) at 11:24

## 2025-01-05 RX ADMIN — MEROPENEM 100 MILLIGRAM(S): 1 INJECTION INTRAVENOUS at 05:29

## 2025-01-05 RX ADMIN — Medication 15 MILLILITER(S): at 17:01

## 2025-01-05 RX ADMIN — Medication 667 MILLIGRAM(S): at 13:55

## 2025-01-05 RX ADMIN — VASOPRESSIN 4.5 UNIT(S)/MIN: 20 INJECTION INTRAVENOUS at 19:21

## 2025-01-05 RX ADMIN — METHOCARBAMOL 500 MILLIGRAM(S): 500 TABLET, FILM COATED ORAL at 05:30

## 2025-01-05 RX ADMIN — Medication 1 APPLICATION(S): at 12:58

## 2025-01-05 RX ADMIN — AMIODARONE HYDROCHLORIDE 600 MILLIGRAM(S): 50 INJECTION, SOLUTION INTRAVENOUS at 12:47

## 2025-01-05 RX ADMIN — Medication 4 MILLILITER(S): at 05:07

## 2025-01-05 RX ADMIN — Medication 1 TABLET(S): at 13:55

## 2025-01-05 RX ADMIN — DOBUTAMINE 6.38 MICROGRAM(S)/KG/MIN: 250 INJECTION INTRAVENOUS at 19:21

## 2025-01-05 RX ADMIN — Medication 650 MILLIGRAM(S): at 13:55

## 2025-01-05 RX ADMIN — MEROPENEM 100 MILLIGRAM(S): 1 INJECTION INTRAVENOUS at 22:00

## 2025-01-05 RX ADMIN — Medication 10 MILLIGRAM(S): at 13:55

## 2025-01-05 RX ADMIN — Medication 25 MICROGRAM(S): at 17:17

## 2025-01-05 RX ADMIN — Medication 650 MILLIGRAM(S): at 05:30

## 2025-01-05 RX ADMIN — Medication 667 MILLIGRAM(S): at 17:24

## 2025-01-05 RX ADMIN — CLOPIDOGREL BISULFATE 75 MILLIGRAM(S): 75 TABLET, FILM COATED ORAL at 13:55

## 2025-01-05 RX ADMIN — Medication 50 MILLIEQUIVALENT(S): at 13:55

## 2025-01-05 NOTE — PROGRESS NOTE ADULT - SUBJECTIVE AND OBJECTIVE BOX
Dr. Yousif  Office [phone] (9 am to 5 pm)  Service: [phone] (5pm to 9am)  Chanell RENTERIA      RENAL PROGRESS NOTE: DATE OF SERVICE 01-05-25 @ 17:05    Patient is a 54y old  Male who presents with a chief complaint of CAD (05 Jan 2025 07:33)      Patient seen and examined at bedside. No apparent distress    VITALS:  T(F): 98.1 (01-05-25 @ 16:00), Max: 100 (01-05-25 @ 04:00)  HR: 105 (01-05-25 @ 17:00)  BP: --  RR: 20 (01-05-25 @ 17:00)  SpO2: 99% (01-05-25 @ 17:00)  Wt(kg): --    01-04 @ 07:01  -  01-05 @ 07:00  --------------------------------------------------------  IN: 1387.1 mL / OUT: 4351 mL / NET: -2963.9 mL    01-05 @ 07:01  -  01-05 @ 17:05  --------------------------------------------------------  IN: 773.9 mL / OUT: 1400 mL / NET: -626.1 mL          PHYSICAL EXAM:  Constitutional: NAD, intubated  Neck: No JVD  Respiratory: CTAB, no wheezes, rales or rhonchi  Cardiovascular: S1, S2, RRR  Gastrointestinal: BS+, soft, NT/ND  Extremities: No peripheral edema    Hospital Medications:   MEDICATIONS  (STANDING):  albuterol/ipratropium for Nebulization 3 milliLiter(s) Nebulizer every 6 hours  aspirin  chewable 81 milliGRAM(s) Oral daily  bisacodyl Suppository 10 milliGRAM(s) Rectal once  calcium acetate 667 milliGRAM(s) Oral three times a day with meals  chlorhexidine 0.12% Liquid 15 milliLiter(s) Oral Mucosa every 12 hours  chlorhexidine 2% Cloths 1 Application(s) Topical daily  clopidogrel Tablet 75 milliGRAM(s) Oral daily  colchicine 0.3 milliGRAM(s) Oral daily  CRRT Treatment    <Continuous>  dexMEDEtomidine Infusion 0.5 MICROgram(s)/kG/Hr (10.6 mL/Hr) IV Continuous <Continuous>  dextrose 50% Injectable 50 milliLiter(s) IV Push every 15 minutes  dextrose 50% Injectable 25 milliLiter(s) IV Push every 15 minutes  DOBUTamine Infusion 6 MICROgram(s)/kG/Min (7.66 mL/Hr) IV Continuous <Continuous>  epoetin ignacia (EPOGEN) Injectable 72483 Unit(s) IV Push <User Schedule>  insulin lispro (ADMELOG) corrective regimen sliding scale   SubCutaneous three times a day before meals  insulin lispro (ADMELOG) corrective regimen sliding scale   SubCutaneous at bedtime  lidocaine   4% Patch 2 Patch Transdermal daily  meropenem  IVPB 1000 milliGRAM(s) IV Intermittent every 8 hours  Nephro-elicia 1 Tablet(s) Oral daily  pantoprazole  Injectable 40 milliGRAM(s) IV Push daily  polyethylene glycol 3350 17 Gram(s) Oral two times a day  PrismaSATE Dialysate BGK 4 / 2.5 5000 milliLiter(s) (1000 mL/Hr) CRRT <Continuous>  PrismaSOL Filtration BGK 4 / 2.5 5000 milliLiter(s) (800 mL/Hr) CRRT <Continuous>  PrismaSOL Filtration BGK 4 / 2.5 5000 milliLiter(s) (200 mL/Hr) CRRT <Continuous>  senna 2 Tablet(s) Oral at bedtime  sodium bicarbonate 650 milliGRAM(s) Oral every 8 hours  sodium chloride 0.9%. 1000 milliLiter(s) (10 mL/Hr) IV Continuous <Continuous>  vasopressin Infusion 0.03 Unit(s)/Min (4.5 mL/Hr) IV Continuous <Continuous>      LABS:  01-05    134[L]  |  96  |  33[H]  ----------------------------<  131[H]  4.6   |  18[L]  |  3.23[H]    Ca    8.8      05 Jan 2025 00:47  Phos  4.4     01-05  Mg     2.2     01-05    TPro  6.6  /  Alb  3.3  /  TBili  2.6[H]  /  DBili      /  AST  560[H]  /  ALT  787[H]  /  AlkPhos  108  01-05    Creatinine Trend: 3.23 <--, 3.81 <--, 4.91 <--, 5.93 <--, 6.32 <--, 6.21 <--, 5.03 <--, 4.67 <--, 3.71 <--, 3.36 <--, 5.24 <--, 6.71 <--    Albumin: 3.3 g/dL (01-05 @ 00:47)  Phosphorus: 4.4 mg/dL (01-05 @ 00:47)                              9.1    24.30 )-----------( 84       ( 05 Jan 2025 00:48 )             28.7     Urine Studies:  Urinalysis - [01-05-25 @ 00:47]      Color  / Appearance  / SG  / pH       Gluc 131 / Ketone   / Bili  / Urobili        Blood  / Protein  / Leuk Est  / Nitrite       RBC  / WBC  / Hyaline  / Gran  / Sq Epi  / Non Sq Epi  / Bacteria       Iron 29, TIBC 143, %sat 20      [12-19-24 @ 05:00]  Ferritin 904      [12-19-24 @ 05:00]  TSH 4.75      [12-24-24 @ 06:50]    HBsAg Nonreact      [12-19-24 @ 05:00]      RADIOLOGY & ADDITIONAL STUDIES:

## 2025-01-05 NOTE — PROGRESS NOTE ADULT - SUBJECTIVE AND OBJECTIVE BOX
HPI:  54M with PMH of HTN, HLD, ESRD on HD MWF via LUE AVF, ascites (requiring repeat paracenteses q4-6wks), NICM with moderate LV dysfunction w/ EF 35-40%(2023) and CAD s/p atherectomy + SALLIE to D1(4/11/2024) presents to [facility] transferred from [facility]. Patient initially presented to [facility] ED with shortness of breath. Of note he was recently admitted from 09/27-12/02 for acute cholecystitis s/p cholecystectomy. In [facility] ED, pt was hypoxic to 86% on RA, trop 1.7K, EKG no new changes, CXR fluid overload. Admitted for AHRF 2/2 fluid overload. Pt received HD on 12/18, 12/19, 12/20 and 12/22. DAPT was resumed, TTE showed improvement in LVEF to 40-45%. Stress test was abnormal with 1mm inferior STD, reversible ischemia in the inferior wall and fixed defects in the lateral, anterior and apical walls with post stress EF of 24%. Pt was then transferred to [facility] for cardiac cath which showed pLAD 70% ISR, mLCx 70%, D1 severe dz. Patient now transferred to [facility] CTS Dr. Rivers for CABG eval. Patient denies any current SOB or chest pain on admission. Otherwise denies headaches, abdominal pain, urinary or bowel changes, fevers, chills, N/V/D, sick contacts.  (24 Dec 2024 05:07)      Patient seen and examined at the bedside.    Remained critically ill on continuous ICU monitoring.    OBJECTIVE:  Vital Signs Last 24 Hrs  T(C): 36.6 (05 Jan 2025 20:00), Max: 37.8 (05 Jan 2025 04:00)  T(F): 97.9 (05 Jan 2025 20:00), Max: 100 (05 Jan 2025 04:00)  HR: 102 (05 Jan 2025 19:45) (67 - 129)  BP: --  BP(mean): --  RR: 18 (05 Jan 2025 19:45) (14 - 26)  SpO2: 100% (05 Jan 2025 19:45) (87% - 100%)    Parameters below as of 05 Jan 2025 20:00  Patient On (Oxygen Delivery Method): ventilator    O2 Concentration (%): 40    Physical Exam:   General: Sluggish, complaining of pain, OOB to chair  Neurology: Sedated   Eyes: bilateral pupils equal and reactive   ENT/Neck: Neck supple, trachea midline, No JVD   Respiratory: Clear bilaterally   CV: S1S2, positive friction rub        [x] Sternal dressing, [x] L Pleural CT        [x] Afib  Abdominal: Soft, mildly distended, NT, ND +BS  Extremities: 1+ pedal edema noted, + peripheral pulses   Skin: No Rashes, Hematoma, Ecchymosis                         Assessment:  54 yr male HTN / HLD / ESRD on HD MWF via LUE AVF / ascites (requiring repeat paracenteses q4-6wks) / NICM with moderate LV dysfunction w/ EF 35-40%(2023) and CAD s/p atherectomy + SALLIE to D1(4/11/2024).  CAD with instent restenosis of LAD with D1 and LCx disease s/p CABG x 3 with free LIMA-LAD, vein to diag and Left PL POD 1    Left ventricular systolic and diastolic dysfunction  Severe tricuspid regurgitation, pulmonary HTN  Acute postoperative respiratory insufficiency  ESRD on iHD  Hyperkalemia  Acute blood loss anemia  Stress hyperglycemia    Plan:   ***Neuro***  Addressed analgesic regimen to optimize function. Continue Tylenol, Gabapentin, Dilaudid prn, and Oxycodone prn. On low dose Precedex.     ***Cardiovascular***  Patient admitted for SOB and fluid overload and was found to have CAD s/p CABG x3 on 12/30. Patient managed post op with an IV Dobutamine infusion for acute on chronic systolic heart failure. Remains on Dobutamine. Has a pressor requirement currently being met with Vasopressin. Will consider adding preop Coreg once stable off of Dobutamine. Amiodarone for atrial fibrillation prophylaxis. Invasive hemodynamic monitoring with a PA catheter & an A-line were required for the following of serial CI's/MV02's and continuous central venous, pulmonary artery pressure and MAP/BP monitoring to ensure adequate cardiovascular support. PAC now removed with ongoing CVP and MAP monitoring. ASA continued for graft occlusion-thromboembolism prophylaxis. IABP with good 1:1 diastolic augmentation in tact. Lipitor was also ordered for long term graft patency.      ***Pulmonary***  Respiratory status required full ventilatory support, close monitoring of respiratory rate and breathing pattern, the following of ABG’s with A-line monitoring, continuous pulse oximetry monitoring.      Mode: AC/ CMV (Assist Control/ Continuous Mandatory Ventilation)  RR (machine): 14  TV (machine): 450  FiO2: 40  PEEP: 8  ITime: 1  MAP: 12  PIP: 23      ***Heme***  Anemia due to acute blood loss. No current plan for transfusion. Monitor hemoglobin and hematocrit levels.     ***GI***  On TF's, tolerating well. Protonix for stress ulcer prophylaxis. Reglan for gut motility. Continue bowel regimen with Miralax and Senna. History of ascites of unclear etiology. Has previously required intermittent paracentesis. Patient may have a component of liver disease with remote drinking history vs. heart failure. No current ascites. Patient has recovered from recent hospitalization for cholecystitis and subsequent cholecystectomy.     ***Renal***  Patient with end stage renal disease receiving intermittent HD and CRRT treatment. Continued monitoring of fluid balance, electrolytes, pH and BUN/Creatinine. Patient underwent HD with fluid removal 12/29 and for clearance only post op due to hyperkalemia.      ***ID***  Afebrile, white blood count elevated. Continue trending white blood count and monitoring fever curve. IV Meropenum started for empiric dosing.    ***Endocrine***  Metabolic stability, stress hyperglycemia required an IV regular Insulin drip while following serial glucose levels to help achieve and maintain euglycemia.        Patient requires continuous monitoring with bedside rhythm monitoring, pulse oximetry monitoring, and continuous central venous and arterial pressure monitoring; and intermittent blood gas analysis. Care plan discussed with the ICU care team.   Patient remained critical, at risk for life threatening decompensation.    I have spent 55 minutes providing critical care management to this patient.    By signing my name below, I, Sonia Sánchez, attest that this documentation has been prepared under the direction and in the presence of Mari Gill MD   Electronically signed: Evelio Gomez, 01-05-25 @ 20:02    I, Mari Gill, personally performed the services described in this documentation. all medical record entries made by the shubhamibdarlene were at my direction and in my presence. I have reviewed the chart and agree that the record reflects my personal performance and is accurate and complete  Electronically signed: Mari Gill MD  HPI:  54M with PMH of HTN, HLD, ESRD on HD MWF via LUE AVF, ascites (requiring repeat paracenteses q4-6wks), NICM with moderate LV dysfunction w/ EF 35-40%(2023) and CAD s/p atherectomy + SALLIE to D1(4/11/2024) presents to [facility] transferred from [facility]. Patient initially presented to [facility] ED with shortness of breath. Of note he was recently admitted from 09/27-12/02 for acute cholecystitis s/p cholecystectomy. In [facility] ED, pt was hypoxic to 86% on RA, trop 1.7K, EKG no new changes, CXR fluid overload. Admitted for AHRF 2/2 fluid overload. Pt received HD on 12/18, 12/19, 12/20 and 12/22. DAPT was resumed, TTE showed improvement in LVEF to 40-45%. Stress test was abnormal with 1mm inferior STD, reversible ischemia in the inferior wall and fixed defects in the lateral, anterior and apical walls with post stress EF of 24%. Pt was then transferred to [facility] for cardiac cath which showed pLAD 70% ISR, mLCx 70%, D1 severe dz. Patient now transferred to [facility] CTS Dr. Rivers for CABG eval. Patient denies any current SOB or chest pain on admission. Otherwise denies headaches, abdominal pain, urinary or bowel changes, fevers, chills, N/V/D, sick contacts.  (24 Dec 2024 05:07)    Patient underwent C3L 12/30. Post operatively, patient developed worsening cardiogenic shock requiring inotropic support and an IABP. In addition, he was reintubated 1/02.     Patient seen and examined at the bedside.    Remained critically ill on continuous ICU monitoring.    OBJECTIVE:  Vital Signs Last 24 Hrs  T(C): 36.6 (05 Jan 2025 20:00), Max: 37.8 (05 Jan 2025 04:00)  T(F): 97.9 (05 Jan 2025 20:00), Max: 100 (05 Jan 2025 04:00)  HR: 102 (05 Jan 2025 19:45) (67 - 129)  BP: --  BP(mean): --  RR: 18 (05 Jan 2025 19:45) (14 - 26)  SpO2: 100% (05 Jan 2025 19:45) (87% - 100%)    Parameters below as of 05 Jan 2025 20:00  Patient On (Oxygen Delivery Method): ventilator    O2 Concentration (%): 40    Physical Exam:   General: Thin male, breathing in sync with the ventilator.  Neurology: Sedated   Eyes: bilateral pupils equal and reactive   ENT/Neck: Neck supple, trachea midline, No JVD   Respiratory: Clear bilaterally   CV: S1S2, positive friction rub        [x] Sternal dressing, [x] L Pleural CT        [x] Afib  Abdominal: Soft, mildly distended, NT, ND +BS  Extremities: 1+ pedal edema noted, + peripheral pulses   Skin: No Rashes, Hematoma, Ecchymosis                         Assessment:  54 yr male HTN / HLD / ESRD on HD MWF via LUE AVF / ascites (requiring repeat paracenteses q4-6wks) / NICM with moderate LV dysfunction w/ EF 35-40%(2023) and CAD s/p atherectomy + SALLIE to D1(4/11/2024).  CAD with instent restenosis of LAD with D1 and LCx disease s/p CABG x 3 with free LIMA-LAD, vein to diag and Left PL POD 1    Left ventricular systolic and diastolic dysfunction  Severe tricuspid regurgitation, pulmonary HTN  Acute postoperative respiratory insufficiency  ESRD on iHD  Hyperkalemia  Acute blood loss anemia  Stress hyperglycemia    Plan:   ***Neuro***  Addressed analgesic regimen to optimize function. Continue Tylenol, Gabapentin, Dilaudid prn, and Oxycodone prn. On low dose Precedex.     ***Cardiovascular***  Patient recently admitted for SOB and fluid overload and was found to have CAD s/p CABG x3 on 12/30. Post operatively patient has required an IV Dobutamine infusion for acute on chronic systolic heart failure and recently required escalation of care with placement of an IABP and an increased dose of Dobutamine . Remains on Dobutamine. Has a pressor requirement currently being met with Vasopressin. Will consider adding preop Coreg once stable off of Dobutamine. Amiodarone for atrial fibrillation prophylaxis. Invasive hemodynamic monitoring with a PA catheter & an A-line were required for the following of serial CI's/MV02's and continuous central venous, pulmonary artery pressure and MAP/BP monitoring to ensure adequate cardiovascular support. PAC now removed with ongoing CVP and MAP monitoring. ASA continued for graft occlusion-thromboembolism prophylaxis. IABP with good 1:1 diastolic augmentation in tact. Lipitor was also ordered for long term graft patency.      ***Pulmonary***  Respiratory status required full ventilatory support, close monitoring of respiratory rate and breathing pattern, the following of ABG’s with A-line monitoring, continuous pulse oximetry monitoring.      Mode: AC/ CMV (Assist Control/ Continuous Mandatory Ventilation)  RR (machine): 14  TV (machine): 450  FiO2: 40  PEEP: 8  ITime: 1  MAP: 12  PIP: 23      ***Heme***  Anemia due to acute blood loss. No current plan for transfusion. Monitor hemoglobin and hematocrit levels.     ***GI***  On TF's, tolerating well. Protonix for stress ulcer prophylaxis. Reglan for gut motility. Continue bowel regimen with Miralax and Senna. History of ascites of unclear etiology. Has previously required intermittent paracentesis. Patient may have a component of liver disease with remote drinking history vs. heart failure. No current ascites. Patient has recovered from recent hospitalization for cholecystitis and subsequent cholecystectomy.     ***Renal***  Patient with end stage renal disease receiving intermittent HD and CRRT treatment. Continued monitoring of fluid balance, electrolytes, pH and BUN/Creatinine. Patient underwent HD with fluid removal 12/29 and for clearance only post op due to hyperkalemia.      ***ID***  Afebrile, white blood count elevated. Continue trending white blood count and monitoring fever curve. IV Meropenum started for empiric dosing.    ***Endocrine***  Metabolic stability, stress hyperglycemia required an IV regular Insulin drip while following serial glucose levels to help achieve and maintain euglycemia.        Patient requires continuous monitoring with bedside rhythm monitoring, pulse oximetry monitoring, and continuous central venous and arterial pressure monitoring; and intermittent blood gas analysis. Care plan discussed with the ICU care team.   Patient remained critical, at risk for life threatening decompensation.    I have spent 55 minutes providing critical care management to this patient.    By signing my name below, I, Sonia Sánchez, attest that this documentation has been prepared under the direction and in the presence of Mari Gill MD   Electronically signed: Evelio Gomez, 01-05-25 @ 20:02    I, Mari Gill, personally performed the services described in this documentation. all medical record entries made by the scribe were at my direction and in my presence. I have reviewed the chart and agree that the record reflects my personal performance and is accurate and complete  Electronically signed: Mari Gill MD  HPI:  54M with PMH of HTN, HLD, ESRD on HD MWF via LUE AVF, ascites (requiring repeat paracenteses q4-6wks), NICM with moderate LV dysfunction w/ EF 35-40%(2023) and CAD s/p atherectomy + SALLIE to D1(4/11/2024) presents to [facility] transferred from [facility]. Patient initially presented to [facility] ED with shortness of breath. Of note he was recently admitted from 09/27-12/02 for acute cholecystitis s/p cholecystectomy. In [facility] ED, pt was hypoxic to 86% on RA, trop 1.7K, EKG no new changes, CXR fluid overload. Admitted for AHRF 2/2 fluid overload. Pt received HD on 12/18, 12/19, 12/20 and 12/22. DAPT was resumed, TTE showed improvement in LVEF to 40-45%. Stress test was abnormal with 1mm inferior STD, reversible ischemia in the inferior wall and fixed defects in the lateral, anterior and apical walls with post stress EF of 24%. Pt was then transferred to [facility] for cardiac cath which showed pLAD 70% ISR, mLCx 70%, D1 severe dz. Patient now transferred to [facility] CTS Dr. Rivers for CABG eval. Patient denies any current SOB or chest pain on admission. Otherwise denies headaches, abdominal pain, urinary or bowel changes, fevers, chills, N/V/D, sick contacts.  (24 Dec 2024 05:07)    Patient underwent C3L 12/30. Post operatively, patient developed worsening cardiogenic shock requiring inotropic support and an IABP. In addition, he was reintubated 1/02.     Patient seen and examined at the bedside.    Remained critically ill on continuous ICU monitoring.    OBJECTIVE:  Vital Signs Last 24 Hrs  T(C): 36.6 (05 Jan 2025 20:00), Max: 37.8 (05 Jan 2025 04:00)  T(F): 97.9 (05 Jan 2025 20:00), Max: 100 (05 Jan 2025 04:00)  HR: 102 (05 Jan 2025 19:45) (67 - 129)  BP: --  BP(mean): --  RR: 18 (05 Jan 2025 19:45) (14 - 26)  SpO2: 100% (05 Jan 2025 19:45) (87% - 100%)    Parameters below as of 05 Jan 2025 20:00  Patient On (Oxygen Delivery Method): ventilator    O2 Concentration (%): 40    Physical Exam:   General: Thin male, breathing in sync with the ventilator.  Neurology: Sedated   Eyes: bilateral pupils equal and reactive   ENT/Neck: Neck supple, trachea midline, No JVD   Respiratory: Clear bilaterally   CV: S1S2, positive friction rub        [x] Sternal dressing, [x] L Pleural CT        [x] Afib  Abdominal: Soft, mildly distended, NT, ND +BS  Extremities: 1+ pedal edema noted, + peripheral pulses by doppler  Skin: No Rashes, Hematoma, Ecchymosis                         Assessment:  54 yr male HTN / HLD / ESRD on HD MWF via LUE AVF / ascites (requiring repeat paracenteses q4-6wks) / NICM with moderate LV dysfunction w/ EF 35-40%(2023) and CAD s/p atherectomy + SALLIE to D1(4/11/2024).  CAD with instent restenosis of LAD with D1 and LCx disease s/p CABG x 3 with free LIMA-LAD, vein to diag and Left PL POD 1    Left ventricular systolic and diastolic dysfunction  Severe tricuspid regurgitation, pulmonary HTN  Acute postoperative respiratory insufficiency  ESRD on iHD  Hyperkalemia  Acute blood loss anemia  Stress hyperglycemia    Plan:   ***Neuro***  Sedated on an IV Dexmedetomidine infusion to help manage agitation and to assist with patient ventilator synchrony.     ***Cardiovascular***  Patient recently admitted for SOB and fluid overload and was found to have CAD s/p CABG x3 on 12/30. Post operatively patient has required an IV Dobutamine infusion for acute on chronic systolic heart failure and recently required escalation of care with placement of an IABP and an increased dose of Dobutamine as well as a Vasopressin infusion. An IV Amiodarone infusion is maintained for new onset atrial fibrillation. Invasive hemodynamic monitoring with a central venous catheter & an A-line were required for continuous central venous and MAP/BP monitoring to ensure adequate cardiovascular support. ASA and plavix continued for graft occlusion-thromboembolism prophylaxis. IABP with good 1:1 diastolic augmentation in tact. Lipitor was also ordered for long term graft patency. Colchicine ordered for pericardial friction rub and elevated ST segments on EKG consistent with pericarditis.      ***Pulmonary***  Post operative acute respiratory failure required full ventilatory support, close monitoring of respiratory rate and breathing pattern, the following of ABG’s with A-line monitoring, continuous pulse oximetry monitoring.      Mode: AC/ CMV (Assist Control/ Continuous Mandatory Ventilation)  RR (machine): 14  TV (machine): 450  FiO2: 40  PEEP: 8  ITime: 1  MAP: 12  PIP: 23      ***Heme***  Anemia due to acute blood loss. No current plan for transfusion. Monitor hemoglobin and hematocrit levels.     ***GI***  Enteral feeding, Nepro@20 cc/hr. Plan to advance slowly. Protonix for stress ulcer prophylaxis. Continue bowel regimen with Miralax and Senna. History of ascites of unclear etiology. Has previously required intermittent paracentesis. Patient may have a component of liver disease with remote drinking history vs. heart failure. Paracentesis performed, ascites fluid not consistent with peritonitis. Patient has recovered from recent hospitalization for cholecystitis and subsequent cholecystectomy.     ***Renal***  Patient with end stage renal disease receiving CRRT at present due to hemodynamic instability. Continued monitoring of fluid balance, electrolytes, pH and BUN/Creatinine.      ***ID***  Ecoli bacteremia with positive ESBL Ecoli on BAL and patient started on treatment with IV Meropenem. Afebrile, white blood count elevated. Continue trending white blood count and monitoring fever curve.     ***Endocrine***  Metabolic stability, stress hyperglycemia required an IV regular Insulin drip while following serial glucose levels to help achieve and maintain euglycemia.        Patient requires continuous monitoring with bedside rhythm monitoring, pulse oximetry monitoring, and continuous central venous and arterial pressure monitoring; and intermittent blood gas analysis. Care plan discussed with the ICU care team.   Patient remained critical, at risk for life threatening decompensation.    I have spent 55 minutes providing critical care management to this patient.    By signing my name below, I, Sonia Sánchez, attest that this documentation has been prepared under the direction and in the presence of Mari Gill MD   Electronically signed: Evelio Gomez, 01-05-25 @ 20:02    I, Mari Gill, personally performed the services described in this documentation. all medical record entries made by the shubhamibdarlene were at my direction and in my presence. I have reviewed the chart and agree that the record reflects my personal performance and is accurate and complete  Electronically signed: Mari Gill MD

## 2025-01-05 NOTE — PROGRESS NOTE ADULT - ASSESSMENT
54M with PMH of HTN, HLD, ESRD on HD MWF via LUE AVF, ascites (requiring repeat paracenteses q4-6wks), NICM with moderate LV dysfunction w/ EF 35-40%() and CAD s/p atherectomy + SALLIE to D1(2024) presents to [facility] transferred from [facility]. Patient initially presented to [facility] ED with shortness of breath. Of note he was recently admitted from - for acute cholecystitis s/p cholecystectomy. In [facility] ED, pt was hypoxic to 86% on RA, trop 1.7K, EKG no new changes, CXR fluid overload. Admitted for AHRF 2/2 fluid overload. Pt received HD on , ,  and . DAPT was resumed, TTE showed improvement in LVEF to 40-45%. Stress test was abnormal with 1mm inferior STD, reversible ischemia in the inferior wall and fixed defects in the lateral, anterior and apical walls with post stress EF of 24%.     Pt was then transferred to [facility] for cardiac cath which showed pLAD 70% ISR, mLCx 70%, D1 severe dz.     Patient now transferred to [facility] CTS Dr. Rivers for CABG eval.    # CAD s/p NSTEMI  Hx CAD s/p PCI LAD   Presented with ADHF elevated troponins  Positive NST  -Cath- pLAD 70% ISR, mLCx 70%, D1 severe dz.   TTE EF 35% Severe TRWas on Plavix-[id number] 321 been off Plavix since -POD#1-C3L free LIMA-LAD; SVG-Diag; OMI-xaciKZ-Hhbhmlwlm on  Sinus rhythm,Amio for AF prophylaxis  -OOB off  Sinus rhythm   -POD#3-On /pressors-EF 25-30% RV failure with transaminitis-Sinus Rhythm  -POD#4-Was intubated for hypoxia-gradual hypotension on /pressors had IABP inserted this morning  -POD#5-s/p IABP insertion, sepsis/septic shock due to GNR/ECOLI HD cath placed   Bronched yesterday 1/3 - minimal secretions with rust-tinged sputum   Paracentesis for suspected bacterial peritonitis-No organisms noted  Transfused 1u pbrcs overnight  Remains on inoptropes and pressors with IABP at 1:1  ESBL-E Coli bacteremia on meropenam        # Acute on Chronic Systolic Heart Failure  EF-35% ,severe TR  Had HD post op  GDMT post op  LVEF improved post bypass but now at 23-30%-BiV dysfunction on /pressors  Vascular to evaluate AVGraft /steal causing RV failure-'' Very low suspicion of steal Sd and AVF causing current clinical state. ''      # Ascites  Hx chronic ascites  Has drainage q4-6 weeks  Last drained -4600mL  ? ascites related to severe TR  Monitor        # ESRD  Now on CRRT

## 2025-01-05 NOTE — PROGRESS NOTE ADULT - ASSESSMENT
54M with PMH of HTN, HLD, ESRD on HD MWF via LUE AVF, ascites (requiring repeat paracenteses q4-6wks), NICM with moderate LV dysfunction w/ EF 35-40%(2023) and CAD s/p atherectomy + SALLIE to D1(4/11/2024) presents to [facility] transferred from [facility] for CABG eval  Nephro on [facility] for HD    ESRD on HD   Regular schedule MWF   Access LUE AVF  [facility]  Nephrologist Dr. Bailey  Last HD on 12/24  S/p HD on 12/27 12/29, 12/30 for hyperkalemia and 12/31  2 L PUF On 01/02/2024  However hospital course now complicated by Cardiogenic shock now on multiple pressors,   Spoke with primary team and will continue CRRT   iHD as needed  Keep MAP>65  Renal Diet  Consent in physical chart    Hyper/Hypokalemia  Monitor K, will change dialysate fluid as needed    HTN  currently on pressure support  monitor bp     CKD MBD  Can send a PTH  Ca and Phos daily    Anemia  CRISTIANE with HD  Transfuse if hgb <7    CAD with multivessel disease  s/p CABG 12/30  CTICU following

## 2025-01-05 NOTE — PROGRESS NOTE ADULT - SUBJECTIVE AND OBJECTIVE BOX
Patient seen and examined at the bedside.    Remains critically ill on continuous ICU monitoring.      Brief Summary:  53 yo M with multiple medical problems including CAD s/p PCI in April 2024 s/p CABG x 3 on 12/30/24 1/2: Intubated for hypoxia & left lung white out s/p awake bronch with removal of copious mucopurulent secretions  1/4: s/p IABP insertion, sepsis/septic shock due to GNR/ECOLI     24 Hour events:  HD cath placed   Bronched yesterday 1/3 - minimal secretions with rust-tinged sputum   Paracentesis for suspected bacterial peritonitis  Transfused 1u pbrcs overnight  Remains on inoptropes and pressors with IABP at 1:1        Objective:  Vital Signs Last 24 Hrs  T(C): 37.8 (05 Jan 2025 04:00), Max: 38.4 (04 Jan 2025 12:00)  T(F): 100 (05 Jan 2025 04:00), Max: 101.1 (04 Jan 2025 12:00)  HR: 75 (05 Jan 2025 07:00) (67 - 80)  BP: --  BP(mean): --  RR: 18 (05 Jan 2025 07:00) (4 - 36)  SpO2: 100% (05 Jan 2025 07:00) (86% - 100%)    Parameters below as of 05 Jan 2025 05:36  Patient On (Oxygen Delivery Method): ventilator        Mode: AC/ CMV (Assist Control/ Continuous Mandatory Ventilation)  RR (machine): 14  TV (machine): 450  FiO2: 50  PEEP: 8  ITime: 1  MAP: 11  PIP: 23              Physical Exam:   General: Intubated  Neurology: Oriented, following commands.   Respiratory: Bilateral breath sounds  CV: Regular  Abdominal: Soft, Nontender  Extremities: Warm, well-perfused  IABP site ok.  -------------------------------------------------------------------------------------------------------------------------------    Labs:                        9.1    24.30 )-----------( 84       ( 05 Jan 2025 00:48 )             28.7     01-05    134[L]  |  96  |  33[H]  ----------------------------<  131[H]  4.6   |  18[L]  |  3.23[H]    Ca    8.8      05 Jan 2025 00:47  Phos  4.4     01-05  Mg     2.2     01-05    TPro  6.6  /  Alb  3.3  /  TBili  2.6[H]  /  DBili  x   /  AST  560[H]  /  ALT  787[H]  /  AlkPhos  108  01-05    LIVER FUNCTIONS - ( 05 Jan 2025 00:47 )  Alb: 3.3 g/dL / Pro: 6.6 g/dL / ALK PHOS: 108 U/L / ALT: 787 U/L / AST: 560 U/L / GGT: x           PT/INR - ( 05 Jan 2025 00:58 )   PT: 23.6 sec;   INR: 2.09 ratio         PTT - ( 05 Jan 2025 00:58 )  PTT:41.1 sec  ABG - ( 05 Jan 2025 04:29 )  pH, Arterial: 7.35  pH, Blood: x     /  pCO2: 38    /  pO2: 154   / HCO3: 21    / Base Excess: -4.2  /  SaO2: 100.0   ------------------------------------------------------------------------------------------------------------------------------  Assessment:  53 yo M s/p CABG x 3 on 12/30/24    Postop acute respiratory failure  Cardiogenic and Septic shock  Acute blood loss anemia  Transaminitis  Lactic Acidosis   Hyperkalemia  Thrombocytopenia   Hyperphosphatemia  ESRD on HD    Plan:    ***Neuro***  Sedated with Precedex  Postoperative acute pain control with Tylenol, Gabapentin, Robaxin, and prns.    ***Cardiovascular***  s/p CABG x 3  Trend lactate  R. fem IABP 1:1  Wean pressors for MAP 65 mm Hg  Remains on Dobutamine and Epinephrine, will wean Epinephrine.  ASA / Plavix   Monitor chest tube output   Vascular surgery consulted for fistula evaluation - may be contributing to heart failure.  Repeat TTE yesterday 1/3: Decreased BiV function, Severe TR    ***Pulmonary***  Alex 20 ppms  Postoperative acute respiratory failure.  Continue vent support.    ***GI***  Will start trickle tube feeds.  Protonix for stress ulcer prophylaxis.   Reglan for gut motility   Bowel regimen.  Transaminitis - will trend, avoid hepatotoxins.  Trend bilirubin.  Paracentesis yesterday 1/3 for suspected bacterial peritonitis - clear yellow fluid    ***Renal***  ESRD on HD  Hyperkalemia - CRRT  Hyperphosphatemia - Phoslo, follow Calcium levels.    ***ID***  Sepsis/septic shock due to ESBL E coli - Continue Merrem per ID recs. Will stop Vanco.  Follow up all cultures  Trend leukocytosis.    ***Endocrine***  D10 for hypoglycemia - will wean to off as trickle TF start.    ***Hematology***  Acute blood loss anemia and thrombocytopenia.  No current transfusion indication.  Off Heparin, will send a TEG in setting of elevated INR.          Care plan discussed with the ICU care team.   Patient remains critical, at risk for life threatening decompensation.    I have spent 30 minutes providing critical care management to this patient.    By signing my name below, I, Sonia Sánchez, attest that this documentation has been prepared under the direction and in the presence of Jen Sierra MD.  Electronically signed: Sonia Sánchez, 01-05-25 @ 07:21    I, Jen Sierra,  personally performed the services described in this documentation. all medical record entries made by the scribe were at my direction and in my presence. I have reviewed the chart and agree that the record reflects my personal performance and is accurate and complete  Electronically signed: Jen Sierra MD. Patient seen and examined at the bedside.    Remains critically ill on continuous ICU monitoring.      Brief Summary:  55 yo M with multiple medical problems including CAD s/p PCI in April 2024 s/p CABG x 3 on 12/30/24 1/2: Intubated for hypoxia & left lung white out s/p awake bronch with removal of copious mucopurulent secretions  1/4: s/p IABP insertion, sepsis/septic shock due to E coli     24 Hour events:  Bronchoscopy yesterday with good clearance.  Tolerating volume removal with CRRT.  Sedation off.      Objective:  Vital Signs Last 24 Hrs  T(C): 37.8 (05 Jan 2025 04:00), Max: 38.4 (04 Jan 2025 12:00)  T(F): 100 (05 Jan 2025 04:00), Max: 101.1 (04 Jan 2025 12:00)  HR: 75 (05 Jan 2025 07:00) (67 - 80)  BP: --  BP(mean): --  RR: 18 (05 Jan 2025 07:00) (4 - 36)  SpO2: 100% (05 Jan 2025 07:00) (86% - 100%)    Parameters below as of 05 Jan 2025 05:36  Patient On (Oxygen Delivery Method): ventilator    Mode: AC/ CMV (Assist Control/ Continuous Mandatory Ventilation)  RR (machine): 14  TV (machine): 450  FiO2: 50  PEEP: 8  ITime: 1  MAP: 11  PIP: 23              Physical Exam:   General: Intubated  Neurology: Sedation off, moving some.  Respiratory: Bilateral breath sounds  CV: Regular  Abdominal: Soft, Nontender  Extremities: Warm, well-perfused  IABP site ok.  -------------------------------------------------------------------------------------------------------------------------------    Labs:                        9.1    24.30 )-----------( 84       ( 05 Jan 2025 00:48 )             28.7     01-05    134[L]  |  96  |  33[H]  ----------------------------<  131[H]  4.6   |  18[L]  |  3.23[H]    Ca    8.8      05 Jan 2025 00:47  Phos  4.4     01-05  Mg     2.2     01-05    TPro  6.6  /  Alb  3.3  /  TBili  2.6[H]  /  DBili  x   /  AST  560[H]  /  ALT  787[H]  /  AlkPhos  108  01-05    LIVER FUNCTIONS - ( 05 Jan 2025 00:47 )  Alb: 3.3 g/dL / Pro: 6.6 g/dL / ALK PHOS: 108 U/L / ALT: 787 U/L / AST: 560 U/L / GGT: x           PT/INR - ( 05 Jan 2025 00:58 )   PT: 23.6 sec;   INR: 2.09 ratio         PTT - ( 05 Jan 2025 00:58 )  PTT:41.1 sec  ABG - ( 05 Jan 2025 04:29 )  pH, Arterial: 7.35  pH, Blood: x     /  pCO2: 38    /  pO2: 154   / HCO3: 21    / Base Excess: -4.2  /  SaO2: 100.0   ------------------------------------------------------------------------------------------------------------------------------  Assessment:  55 yo M s/p CABG x 3 on 12/30/24    Postop acute respiratory failure  Cardiogenic and Septic shock  Acute blood loss anemia  Transaminitis  Thrombocytopenia   ESRD on HD  E coli bacteremia    Plan:    ***Neuro***  Off sedation currently  Postoperative acute pain control with prn Fentanyl.    ***Cardiovascular***  s/p CABG x 3  Trend lactate  R. fem IABP 1:1  Wean pressors for MAP 65 mm Hg  Remains on Dobutamine, will wean slightly  Nitric oxide at 20 ppm  ASA / Plavix   Monitor chest tube output   Vascular surgery consulted for fistula evaluation    ***Pulmonary***  Postoperative acute respiratory failure.  Continue vent support.    ***GI***  Advance tube feeds.  Protonix for stress ulcer prophylaxis.   Reglan for gut motility   Bowel regimen.  Transaminitis - will trend, avoid hepatotoxins.  Trend bilirubin.  Paracentesis yesterday 1/3 for suspected bacterial peritonitis - clear yellow fluid    ***Renal***  ESRD on CRRT - volume removal as tolerated.    ***ID***  Sepsis/septic shock due to ESBL E coli - Continue Merrem per ID recs.   Repeat blood cultures today.  Trend leukocytosis.    ***Endocrine***  D10 off, monitor blood sugars.    ***Hematology***  Acute blood loss anemia and thrombocytopenia.  No current transfusion indication.  Off Heparin      Care plan discussed with the ICU care team.   Patient remains critical, at risk for life threatening decompensation.    I have spent 50 minutes providing critical care management to this patient.    By signing my name below, I, Sonia Sánchez, attest that this documentation has been prepared under the direction and in the presence of Jen Sierra MD.  Electronically signed: Sonia Sánchez, 01-05-25 @ 07:21    I, Jen Sierra, personally performed the services described in this documentation. all medical record entries made by the scribe were at my direction and in my presence. I have reviewed the chart and agree that the record reflects my personal performance and is accurate and complete  Electronically signed: Jen Sierra MD.

## 2025-01-05 NOTE — PROGRESS NOTE ADULT - SUBJECTIVE AND OBJECTIVE BOX
MR#[id number]  PATIENT NAME:RAAD CANO    DATE OF SERVICE: 25 @ 07:33  Patient was seen and examined by Solo Quick MD on    25 @ 07:33 .  Interim events noted.Consultant notes ,Labs,Telemetry reviewed by me       HOSPITAL COURSE: HPI:  54M with PMH of HTN, HLD, ESRD on HD MWF via LUE AVF, ascites (requiring repeat paracenteses q4-6wks), NICM with moderate LV dysfunction w/ EF 35-40%() and CAD s/p atherectomy + SALLIE to D1(2024) presents to [facility] transferred from [facility]. Patient initially presented to [facility] ED with shortness of breath. Of note he was recently admitted from - for acute cholecystitis s/p cholecystectomy. In [facility] ED, pt was hypoxic to 86% on RA, trop 1.7K, EKG no new changes, CXR fluid overload. Admitted for AHRF 2/2 fluid overload. Pt received HD on , ,  and . DAPT was resumed, TTE showed improvement in LVEF to 40-45%. Stress test was abnormal with 1mm inferior STD, reversible ischemia in the inferior wall and fixed defects in the lateral, anterior and apical walls with post stress EF of 24%. Pt was then transferred to [facility] for cardiac cath which showed pLAD 70% ISR, mLCx 70%, D1 severe dz. Patient now transferred to [facility] CTS Dr. Rivers for CABG eval. Patient denies any current SOB or chest pain on admission. Otherwise denies headaches, abdominal pain, urinary or bowel changes, fevers, chills, N/V/D, sick contacts.  (24 Dec 2024 05:07)            INTERIM EVENTS:Patient seen at bedside ,interim events noted.   Cardiac cath  pLAD 70% ISR, mLCx 70%, D1 severe dz.   -POD#1-C3L free LIMA-LAD; SVG-Diag; SVG-leftPL Awake OOB extubated Sinus rhythm on Dobutamine gtt  - Was intubated last night for airway protection s/p bronchoscopy This morning had IABP inserted  remains sedated intubated Sinus Rhythm  - s/p IABP insertion, sepsis/septic shock due to GNR/ECOLI -Paracentesis for suspected bacterial peritonitis  Transfused 1u pbrcs overnight  Remains on inoptropes and pressors with IABP at 1:1  -Intunated on IABP 1:1,Alex@20ppm,on ,weaning pressors    MEDICATIONS  (STANDING):  albuterol/ipratropium for Nebulization 3 milliLiter(s) Nebulizer every 6 hours  aspirin  chewable 81 milliGRAM(s) Oral daily  bisacodyl Suppository 10 milliGRAM(s) Rectal once  calcium acetate 667 milliGRAM(s) Oral three times a day with meals  chlorhexidine 0.12% Liquid 15 milliLiter(s) Oral Mucosa every 12 hours  chlorhexidine 2% Cloths 1 Application(s) Topical daily  clopidogrel Tablet 75 milliGRAM(s) Oral daily  colchicine 0.3 milliGRAM(s) Oral daily  CRRT Treatment    <Continuous>  dexMEDEtomidine Infusion 0.5 MICROgram(s)/kG/Hr (10.6 mL/Hr) IV Continuous <Continuous>  dextrose 10%. 1000 milliLiter(s) (10 mL/Hr) IV Continuous <Continuous>  dextrose 50% Injectable 50 milliLiter(s) IV Push every 15 minutes  dextrose 50% Injectable 25 milliLiter(s) IV Push every 15 minutes  DOBUTamine Infusion 7.5 MICROgram(s)/kG/Min (9.57 mL/Hr) IV Continuous <Continuous>  EPINEPHrine    Infusion 0.02 MICROgram(s)/kG/Min (6.38 mL/Hr) IV Continuous <Continuous>  epoetin ignacia (EPOGEN) Injectable 05639 Unit(s) IV Push <User Schedule>  insulin lispro (ADMELOG) corrective regimen sliding scale   SubCutaneous three times a day before meals  insulin lispro (ADMELOG) corrective regimen sliding scale   SubCutaneous at bedtime  lidocaine   4% Patch 2 Patch Transdermal daily  meropenem  IVPB 1000 milliGRAM(s) IV Intermittent every 8 hours  methocarbamol 500 milliGRAM(s) Oral every 8 hours  metoclopramide Injectable 10 milliGRAM(s) IV Push every 8 hours  Nephro-elicia 1 Tablet(s) Oral daily  norepinephrine Infusion 0.08 MICROgram(s)/kG/Min (6.38 mL/Hr) IV Continuous <Continuous>  pantoprazole  Injectable 40 milliGRAM(s) IV Push daily  polyethylene glycol 3350 17 Gram(s) Oral two times a day  PrismaSATE Dialysate BK 0 / 3.5 5000 milliLiter(s) (1000 mL/Hr) CRRT <Continuous>  PrismaSOL Filtration BGK 0 / 2.5 5000 milliLiter(s) (800 mL/Hr) CRRT <Continuous>  PrismaSOL Filtration BGK 0 / 2.5 5000 milliLiter(s) (200 mL/Hr) CRRT <Continuous>  senna 2 Tablet(s) Oral at bedtime  sodium bicarbonate 650 milliGRAM(s) Oral every 8 hours  sodium chloride 0.9%. 1000 milliLiter(s) (10 mL/Hr) IV Continuous <Continuous>  vasopressin Infusion 0.03 Unit(s)/Min (4.5 mL/Hr) IV Continuous <Continuous>    MEDICATIONS  (PRN):  acetaminophen     Tablet .. 650 milliGRAM(s) Oral every 6 hours PRN Mild Pain (1 - 3)  fentaNYL    Injectable 25 MICROGram(s) IV Push every 3 hours PRN Severe Pain (7 - 10)  fentaNYL    Injectable 12.5 MICROGram(s) IV Push every 2 hours PRN Moderate Pain (4 - 6)  oxyCODONE    IR 5 milliGRAM(s) Oral every 4 hours PRN Moderate Pain (4 - 6)  oxyCODONE    IR 10 milliGRAM(s) Oral every 4 hours PRN Severe Pain (7 - 10)            REVIEW OF SYSTEMS:  Constitutional: [ ] fever, [ ]weight loss,  [ ]fatigue [ ]weight gain  Eyes: [ ] visual changes  Respiratory: [ ]shortness of breath;  [ ] cough, [ ]wheezing, [ ]chills, [ ]hemoptysis  Cardiovascular: [ ] chest pain, [ ]palpitations, [ ]dizziness,  [ ]leg swelling[ ]orthopnea[ ]PND  Gastrointestinal: [ ] abdominal pain, [ ]nausea, [ ]vomiting,  [ ]diarrhea [ ]Constipation [ ]Melena  Genitourinary: [ ] dysuria, [ ] hematuria [ ]Vigil  Neurologic: [ ] headaches [ ] tremors[ ]weakness [ ]Paralysis Right[ ] Left[ ]  Skin: [ ] itching, [ ]burning, [ ] rashes  Endocrine: [ ] heat or cold intolerance  Musculoskeletal: [ ] joint pain or swelling; [ ] muscle, back, or extremity pain  Psychiatric: [ ] depression, [ ]anxiety, [ ]mood swings, or [ ]difficulty sleeping  Hematologic: [ ] easy bruising, [ ] bleeding gums    [ ] All remaining systems negative except as per above.   [x ]Unable to obtain.  [x] No change in ROS since admission      Vital Signs Last 24 Hrs  T(C): 37.8 (2025 04:00), Max: 38.4 (2025 12:00)  T(F): 100 (2025 04:00), Max: 101.1 (2025 12:00)  HR: 75 (2025 07:00) (67 - 80)  BP: --114/37  RR: 18 (2025 07:00) (4 - 36)  SpO2: 100% (2025 07:00) (86% - 100%)    Parameters below as of 2025 05:36  Patient On (Oxygen Delivery Method): ventilator      I&O's Summary    2025 07:01  -  2025 07:00  --------------------------------------------------------  IN: 1387.1 mL / OUT: 4351 mL / NET: -2963.9 mL        PHYSICAL EXAM:  General: No acute distress BMI-28  HEENT: EOMI, PERRL  Neck: Supple, [ ] JVD  Lungs: Equal air entry bilaterally; [ ] rales [ ] wheezing [ ] rhonchi  Heart: Regular rate and rhythm; [x ] murmur   2/6 [ x] systolic [ ] diastolic [ ] radiation[ ] rubs [ ]  gallops  Abdomen: Nontender, bowel sounds present  Extremities: No clubbing, cyanosis, [ ] edema [ ]Pulses  equal and intact  Nervous system: Sedated intubated  Skin: No rashes or lesions    LABS:      134[L]  |  96  |  33[H]  ----------------------------<  131[H]  4.6   |  18[L]  |  3.23[H]    Ca    8.8      2025 00:47  Phos  4.4     -05  Mg     2.2     01-05    TPro  6.6  /  Alb  3.3  /  TBili  2.6[H]  /  DBili  x   /  AST  560[H]  /  ALT  787[H]  /  AlkPhos  108      Creatinine Trend: 3.23<--, 3.81<--, 4.91<--, 5.93<--, 6.32<--, 6.21<--                        9.1    24.30 )-----------( 84       ( 2025 00:48 )             28.7     PT/INR - ( 2025 00:58 )   PT: 23.6 sec;   INR: 2.09 ratio         PTT - ( 2025 00:58 )  PTT:41.1 sec    Culture - Body Fluid with Gram Stain (25 @ 15:34)   Gram Stain: polymorphonuclear leukocytes seen   No organisms seen by cytocentrifuge  Specimen Source: Peritoneal  Culture Results: -No growth    Culture - Bronchial (25 @ 05:51)   Gram Stain: Numerous polymorphonuclear leukocytes seen per low power field   Numerous Gram Negative Rods seen per oil power field  Specimen Source: Bronchial  Culture Results: -Numerous Escherichia coli    Culture - Blood (25 @ 20:30)   - ESBL: Detec  - Escherichia coli: Detec  - CTX-M Resistance Marker: Detec  - Meropenem: S <=1 (25 @ 20:45)      Xray Chest 1 View- PORTABLE-Urgent (Xray Chest 1 View- PORTABLE-Urgent .) (25 @ 13:15) >  IMPRESSION:  Additional enteric tube terminates just below the GE junction/proximal   stomach. Other lines and tubes as above.  Small left effusion and atelectasis     12 Lead ECG (25 @ 00:57) >   NORMAL SINUS RHYTHM  LEFT AXIS DEVIATION  LOW VOLTAGE QRS  INCOMPLETE RIGHT BUNDLE BRANCH BLOCK  POSSIBLE ACUTE PERICARDITIS  PROLONGED QT    TTE W or WO Ultrasound Enhancing Agent (25 @ 11:09) >  CONCLUSIONS:      1. Left ventricular systolic function is moderately to severely decreased with anejection fraction of 35 % by 3D. Global left ventricular hypokinesis.   2. Enlarged right ventricular cavity size and moderately to severely reduced right ventricular systolic function.   3. Severe tricuspid regurgitation.   4. Compared to the transthoracic echocardiogram performed on 1/3/2025, there have been no significant interval changes.

## 2025-01-06 LAB
ALBUMIN SERPL ELPH-MCNC: 3.2 G/DL — LOW (ref 3.3–5)
ALP SERPL-CCNC: 122 U/L — HIGH (ref 40–120)
ALT FLD-CCNC: 565 U/L — HIGH (ref 10–45)
ANION GAP SERPL CALC-SCNC: 18 MMOL/L — HIGH (ref 5–17)
APTT BLD: 34.3 SEC — SIGNIFICANT CHANGE UP (ref 24.5–35.6)
AST SERPL-CCNC: 301 U/L — HIGH (ref 10–40)
BASE EXCESS BLDV CALC-SCNC: -1.8 MMOL/L — SIGNIFICANT CHANGE UP (ref -2–3)
BASE EXCESS BLDV CALC-SCNC: -2.8 MMOL/L — LOW (ref -2–3)
BASE EXCESS BLDV CALC-SCNC: -3 MMOL/L — LOW (ref -2–3)
BASE EXCESS BLDV CALC-SCNC: -3 MMOL/L — LOW (ref -2–3)
BILIRUB SERPL-MCNC: 2.3 MG/DL — HIGH (ref 0.2–1.2)
BUN SERPL-MCNC: 31 MG/DL — HIGH (ref 7–23)
CALCIUM SERPL-MCNC: 8.7 MG/DL — SIGNIFICANT CHANGE UP (ref 8.4–10.5)
CHLORIDE SERPL-SCNC: 96 MMOL/L — SIGNIFICANT CHANGE UP (ref 96–108)
CO2 BLDV-SCNC: 24 MMOL/L — SIGNIFICANT CHANGE UP (ref 22–26)
CO2 BLDV-SCNC: 24 MMOL/L — SIGNIFICANT CHANGE UP (ref 22–26)
CO2 BLDV-SCNC: 25 MMOL/L — SIGNIFICANT CHANGE UP (ref 22–26)
CO2 BLDV-SCNC: 25 MMOL/L — SIGNIFICANT CHANGE UP (ref 22–26)
CO2 SERPL-SCNC: 19 MMOL/L — LOW (ref 22–31)
CREAT SERPL-MCNC: 2.45 MG/DL — HIGH (ref 0.5–1.3)
EGFR: 30 ML/MIN/1.73M2 — LOW
EGFR: 30 ML/MIN/1.73M2 — LOW
GAS PNL BLDA: SIGNIFICANT CHANGE UP
GAS PNL BLDV: SIGNIFICANT CHANGE UP
GLUCOSE SERPL-MCNC: 168 MG/DL — HIGH (ref 70–99)
HCO3 BLDV-SCNC: 23 MMOL/L — SIGNIFICANT CHANGE UP (ref 22–29)
HCO3 BLDV-SCNC: 24 MMOL/L — SIGNIFICANT CHANGE UP (ref 22–29)
HCT VFR BLD CALC: 25.6 % — LOW (ref 39–50)
HCT VFR BLD CALC: 26.2 % — LOW (ref 39–50)
HCT VFR BLD CALC: 27.3 % — LOW (ref 39–50)
HGB BLD-MCNC: 8.2 G/DL — LOW (ref 13–17)
HGB BLD-MCNC: 8.5 G/DL — LOW (ref 13–17)
HGB BLD-MCNC: 8.7 G/DL — LOW (ref 13–17)
HOROWITZ INDEX BLDV+IHG-RTO: 40 — SIGNIFICANT CHANGE UP
INR BLD: 1.5 RATIO — HIGH (ref 0.85–1.16)
MCHC RBC-ENTMCNC: 30.4 PG — SIGNIFICANT CHANGE UP (ref 27–34)
MCHC RBC-ENTMCNC: 30.6 PG — SIGNIFICANT CHANGE UP (ref 27–34)
MCHC RBC-ENTMCNC: 31.1 PG — SIGNIFICANT CHANGE UP (ref 27–34)
MCHC RBC-ENTMCNC: 31.9 G/DL — LOW (ref 32–36)
MCHC RBC-ENTMCNC: 32 G/DL — SIGNIFICANT CHANGE UP (ref 32–36)
MCHC RBC-ENTMCNC: 32.4 G/DL — SIGNIFICANT CHANGE UP (ref 32–36)
MCV RBC AUTO: 95.5 FL — SIGNIFICANT CHANGE UP (ref 80–100)
MCV RBC AUTO: 95.5 FL — SIGNIFICANT CHANGE UP (ref 80–100)
MCV RBC AUTO: 96 FL — SIGNIFICANT CHANGE UP (ref 80–100)
NRBC # BLD: 0 /100 WBCS — SIGNIFICANT CHANGE UP (ref 0–0)
NRBC BLD-RTO: 0 /100 WBCS — SIGNIFICANT CHANGE UP (ref 0–0)
PCO2 BLDV: 42 MMHG — SIGNIFICANT CHANGE UP (ref 42–55)
PCO2 BLDV: 44 MMHG — SIGNIFICANT CHANGE UP (ref 42–55)
PH BLDV: 7.33 — SIGNIFICANT CHANGE UP (ref 7.32–7.43)
PH BLDV: 7.34 — SIGNIFICANT CHANGE UP (ref 7.32–7.43)
PH BLDV: 7.34 — SIGNIFICANT CHANGE UP (ref 7.32–7.43)
PH BLDV: 7.36 — SIGNIFICANT CHANGE UP (ref 7.32–7.43)
PLATELET # BLD AUTO: 52 K/UL — LOW (ref 150–400)
PLATELET # BLD AUTO: 65 K/UL — LOW (ref 150–400)
PLATELET # BLD AUTO: 70 K/UL — LOW (ref 150–400)
PO2 BLDV: 49 MMHG — HIGH (ref 25–45)
PO2 BLDV: 51 MMHG — HIGH (ref 25–45)
PO2 BLDV: 52 MMHG — HIGH (ref 25–45)
PO2 BLDV: 53 MMHG — HIGH (ref 25–45)
POTASSIUM SERPL-MCNC: 3.8 MMOL/L — SIGNIFICANT CHANGE UP (ref 3.5–5.3)
POTASSIUM SERPL-SCNC: 3.8 MMOL/L — SIGNIFICANT CHANGE UP (ref 3.5–5.3)
PROT SERPL-MCNC: 6.4 G/DL — SIGNIFICANT CHANGE UP (ref 6–8.3)
PROTHROM AB SERPL-ACNC: 17.2 SEC — HIGH (ref 9.9–13.4)
RBC # BLD: 2.73 M/UL — LOW (ref 4.2–5.8)
RBC # BLD: 2.86 M/UL — LOW (ref 4.2–5.8)
RBC # FLD: 18.1 % — HIGH (ref 10.3–14.5)
RBC # FLD: 18.3 % — HIGH (ref 10.3–14.5)
RBC # FLD: 18.5 % — HIGH (ref 10.3–14.5)
SAO2 % BLDV: 81.4 % — SIGNIFICANT CHANGE UP (ref 67–88)
SAO2 % BLDV: 81.7 % — SIGNIFICANT CHANGE UP (ref 67–88)
SAO2 % BLDV: 86.8 % — SIGNIFICANT CHANGE UP (ref 67–88)
SAO2 % BLDV: 88.4 % — HIGH (ref 67–88)
SODIUM SERPL-SCNC: 133 MMOL/L — LOW (ref 135–145)
WBC # BLD: 21.81 K/UL — HIGH (ref 3.8–10.5)
WBC # BLD: 23.28 K/UL — HIGH (ref 3.8–10.5)
WBC # FLD AUTO: 21.81 K/UL — HIGH (ref 3.8–10.5)
WBC # FLD AUTO: 23.28 K/UL — HIGH (ref 3.8–10.5)
WBC # FLD AUTO: 23.31 K/UL — HIGH (ref 3.8–10.5)

## 2025-01-06 PROCEDURE — 33968 REMOVE AORTIC ASSIST DEVICE: CPT

## 2025-01-06 PROCEDURE — 71045 X-RAY EXAM CHEST 1 VIEW: CPT | Mod: 26,76

## 2025-01-06 PROCEDURE — 99292 CRITICAL CARE ADDL 30 MIN: CPT | Mod: FS,25

## 2025-01-06 PROCEDURE — 93010 ELECTROCARDIOGRAM REPORT: CPT

## 2025-01-06 PROCEDURE — 99291 CRITICAL CARE FIRST HOUR: CPT | Mod: FS

## 2025-01-06 PROCEDURE — G0545: CPT

## 2025-01-06 PROCEDURE — 93990 DOPPLER FLOW TESTING: CPT | Mod: 26

## 2025-01-06 PROCEDURE — 99232 SBSQ HOSP IP/OBS MODERATE 35: CPT

## 2025-01-06 RX ORDER — AMIODARONE HYDROCHLORIDE 50 MG/ML
0.5 INJECTION, SOLUTION INTRAVENOUS
Qty: 450 | Refills: 0 | Status: DISCONTINUED | OUTPATIENT
Start: 2025-01-06 | End: 2025-01-10

## 2025-01-06 RX ORDER — AMIODARONE HYDROCHLORIDE 50 MG/ML
150 INJECTION, SOLUTION INTRAVENOUS ONCE
Refills: 0 | Status: COMPLETED | OUTPATIENT
Start: 2025-01-06 | End: 2025-01-06

## 2025-01-06 RX ORDER — NOREPINEPHRINE BITARTRATE 8 MG
0.05 SOLUTION INTRAVENOUS
Qty: 8 | Refills: 0 | Status: DISCONTINUED | OUTPATIENT
Start: 2025-01-06 | End: 2025-01-07

## 2025-01-06 RX ORDER — INSULIN LISPRO 100 U/ML
INJECTION, SOLUTION INTRAVENOUS; SUBCUTANEOUS EVERY 6 HOURS
Refills: 0 | Status: DISCONTINUED | OUTPATIENT
Start: 2025-01-06 | End: 2025-03-14

## 2025-01-06 RX ORDER — ACETAMINOPHEN 500 MG/5ML
1000 LIQUID (ML) ORAL ONCE
Refills: 0 | Status: COMPLETED | OUTPATIENT
Start: 2025-01-06 | End: 2025-01-06

## 2025-01-06 RX ADMIN — Medication 81 MILLIGRAM(S): at 11:24

## 2025-01-06 RX ADMIN — INSULIN LISPRO 2: 100 INJECTION, SOLUTION INTRAVENOUS; SUBCUTANEOUS at 06:50

## 2025-01-06 RX ADMIN — Medication 650 MILLIGRAM(S): at 05:10

## 2025-01-06 RX ADMIN — Medication 2 TABLET(S): at 22:17

## 2025-01-06 RX ADMIN — Medication 40 MILLIGRAM(S): at 11:23

## 2025-01-06 RX ADMIN — Medication 15 MILLILITER(S): at 05:10

## 2025-01-06 RX ADMIN — IPRATROPIUM BROMIDE AND ALBUTEROL SULFATE 3 MILLILITER(S): .5; 2.5 SOLUTION RESPIRATORY (INHALATION) at 23:03

## 2025-01-06 RX ADMIN — Medication 25 MICROGRAM(S): at 05:00

## 2025-01-06 RX ADMIN — Medication 1 APPLICATION(S): at 11:48

## 2025-01-06 RX ADMIN — MEROPENEM 100 MILLIGRAM(S): 1 INJECTION INTRAVENOUS at 14:22

## 2025-01-06 RX ADMIN — Medication 1000 MILLIGRAM(S): at 17:30

## 2025-01-06 RX ADMIN — EPOETIN ALFA 10000 UNIT(S): 10000 SOLUTION INTRAVENOUS; SUBCUTANEOUS at 11:48

## 2025-01-06 RX ADMIN — Medication 667 MILLIGRAM(S): at 11:47

## 2025-01-06 RX ADMIN — MEROPENEM 100 MILLIGRAM(S): 1 INJECTION INTRAVENOUS at 04:10

## 2025-01-06 RX ADMIN — Medication 12.5 MICROGRAM(S): at 23:10

## 2025-01-06 RX ADMIN — Medication 25 MICROGRAM(S): at 11:10

## 2025-01-06 RX ADMIN — Medication 15 MILLILITER(S): at 17:11

## 2025-01-06 RX ADMIN — Medication 667 MILLIGRAM(S): at 11:10

## 2025-01-06 RX ADMIN — AMIODARONE HYDROCHLORIDE 16.7 MG/MIN: 50 INJECTION, SOLUTION INTRAVENOUS at 23:10

## 2025-01-06 RX ADMIN — Medication 25 MICROGRAM(S): at 17:37

## 2025-01-06 RX ADMIN — IPRATROPIUM BROMIDE AND ALBUTEROL SULFATE 3 MILLILITER(S): .5; 2.5 SOLUTION RESPIRATORY (INHALATION) at 05:42

## 2025-01-06 RX ADMIN — CLOPIDOGREL BISULFATE 75 MILLIGRAM(S): 75 TABLET, FILM COATED ORAL at 11:24

## 2025-01-06 RX ADMIN — Medication 25 MICROGRAM(S): at 17:52

## 2025-01-06 RX ADMIN — Medication 667 MILLIGRAM(S): at 17:10

## 2025-01-06 RX ADMIN — Medication 400 MILLIGRAM(S): at 17:10

## 2025-01-06 RX ADMIN — IPRATROPIUM BROMIDE AND ALBUTEROL SULFATE 3 MILLILITER(S): .5; 2.5 SOLUTION RESPIRATORY (INHALATION) at 12:27

## 2025-01-06 RX ADMIN — Medication 25 MICROGRAM(S): at 02:00

## 2025-01-06 RX ADMIN — COLCHICINE 0.3 MILLIGRAM(S): 0.6 TABLET, FILM COATED ORAL at 11:24

## 2025-01-06 RX ADMIN — MEROPENEM 100 MILLIGRAM(S): 1 INJECTION INTRAVENOUS at 20:40

## 2025-01-06 RX ADMIN — Medication 25 MICROGRAM(S): at 11:25

## 2025-01-06 RX ADMIN — Medication 25 MICROGRAM(S): at 02:15

## 2025-01-06 RX ADMIN — Medication 25 MICROGRAM(S): at 05:15

## 2025-01-06 RX ADMIN — AMIODARONE HYDROCHLORIDE 600 MILLIGRAM(S): 50 INJECTION, SOLUTION INTRAVENOUS at 18:36

## 2025-01-06 RX ADMIN — INSULIN LISPRO 2: 100 INJECTION, SOLUTION INTRAVENOUS; SUBCUTANEOUS at 11:23

## 2025-01-06 RX ADMIN — INSULIN LISPRO 2: 100 INJECTION, SOLUTION INTRAVENOUS; SUBCUTANEOUS at 23:21

## 2025-01-06 RX ADMIN — Medication 12.5 MICROGRAM(S): at 22:40

## 2025-01-06 RX ADMIN — IPRATROPIUM BROMIDE AND ALBUTEROL SULFATE 3 MILLILITER(S): .5; 2.5 SOLUTION RESPIRATORY (INHALATION) at 17:30

## 2025-01-06 RX ADMIN — Medication 1 TABLET(S): at 11:23

## 2025-01-06 NOTE — CHART NOTE - NSCHARTNOTEFT_GEN_A_CORE
NUTRITION FOLLOW UP NOTE    PATIENT SEEN FOR: ICU follow up    SOURCE: [] Patient  [x] Current Medical Record  [x] RN  [] Family/support person at bedside  [x] Patient unavailable/inappropriate  [] Other:  -pt intubated and sedated    CHART REVIEWED/EVENTS NOTED.  [] No changes to nutrition care plan to note  [x] Nutrition Status:  -s/p CABG 24  -intubated for hypoxia 25  -s/p paracentesis 1/3/25  -s/p IABP insertion 25  -sepsis  -shock  -Hx ESRD on HD, currently on Continuous renal replacement therapy     DIET ORDER:   Diet, NPO with Tube Feed:   Tube Feeding Modality: Nasogastric  Nepro with Carb Steady (NEPRORTH)  Total Volume for 24 Hours (mL): 480  Continuous  Starting Tube Feed Rate {mL per Hour}: 10  Until Goal Tube Feed Rate (mL per Hour): 20  Tube Feed Duration (in Hours): 24  Tube Feed Start Time: 13:00 (25)      CURRENT DIET ORDER IS:  [] Appropriate:  [x] Inadequate: patient on trickle feeds currently  [] Other:    NUTRITION INTAKE/PROVISION:  [] PO:  [x] Enteral Nutrition:  EN Order Provides: 480 ml formula, 864 kcal, 39 g protein, 349 ml free water; meets 11.1 kcal/kg and 0.5 g protein/kg, based on wt: IBW 172lb/78kg    Current Pump Rate: 20ml/hr at time of RD visit  2-Day Average Enteral Provision per RN flowsheet: 295 mL, 531 kcals, 24 g protein  (tube feeds initiated ).  [] Parenteral Nutrition:    ANTHROPOMETRICS:  Drug Dosing Weight  Height (cm): 180.3 (30 Dec 2024 12:01)  Weight (kg): 85.1 (30 Dec 2024 12:01)  BMI (kg/m2): 26.2 (30 Dec 2024 12:01)  Weights:   Daily Weight in k (), 86.2 (), 90 (), 84 (), 77.5 (), 79.5 (), 80.4 (), 87.8 (), 88.3 (, standing), 85.3 (, standing), 85 (, standing), 79.7 (), 80.9 (, standing), 81.3 (, standing), 82.6 ()    NUTRITIONALLY PERTINENT MEDICATIONS:  MEDICATIONS  (STANDING):  aMIOdarone Infusion  bisacodyl Suppository  calcium acetate  colchicine  dextrose 50% Injectable  dextrose 50% Injectable  DOBUTamine Infusion  insulin lispro (ADMELOG) corrective regimen sliding scale  meropenem  IVPB  Nephro-elicia  norepinephrine Infusion  pantoprazole  Injectable  polyethylene glycol 3350  senna  sodium bicarbonate  sodium chloride 0.9%.  vasopressin Infusion       NUTRITIONALLY PERTINENT LABS:   Na133 mmol/L[L] Glu 168 mg/dL[H] K+ 3.8 mmol/L Cr  2.45 mg/dL[H] BUN 31 mg/dL[H]  Phos 2.7 mg/dL  Alb 3.2 g/dL[L]  U/L[H]  U/L[H] Alkaline Phosphatase 122 U/L[H]  24 @ 06:50 a1c 5.6    A1C with Estimated Average Glucose Result: 5.6 % (24 @ 06:50)          Finger Sticks:  POCT Blood Glucose.: 172 mg/dL ( @ 06:33)  POCT Blood Glucose.: 186 mg/dL ( @ 06:31)  POCT Blood Glucose.: 139 mg/dL ( @ 17:19)  POCT Blood Glucose.: 118 mg/dL ( @ 11:45)      NUTRITIONALLY PERTINENT MEDICATIONS/LABS:  [x] Reviewed  [x] Relevant notes on medications/labs:  -sliding scale insulin for glycemic control  -dobutamine  -norepinephrine @ 0.01 micrograms/kG/min   -Vasopressin @ 0.1 units/min     EDEMA:  [x] Reviewed  [x] Relevant notes: 1+ generalized edema per flowsheets     GI/ I&O:  [x] Reviewed  [x] Relevant notes: noted with green stool 1/5 and 1/6 per flowsheets   [] Other:    SKIN:   [] No pressure injuries documented, per nursing flowsheet  [x] Pressure injury previously noted  -newly documented right buttocks suspected deep tissue injury per flowsheets   [] Change in pressure injury documentation:  [x] Other:  -midsternal surgical incision from CABG     ESTIMATED NEEDS:  [] No change:  [x] Updated:  Energy:  5814-3237 kcal/day (28-33 kcal/kg)  Protein:  109-140 g/day (1.4-1.8 g/kg)  Fluid:   ml/day or [x] defer to team  Based on: IBW 172lb/78kg  Albin state equation: 2025 kcal    NUTRITION DIAGNOSIS:  [x] Prior Dx: Inadequate energy intake, Increased Nutrient Needs (protein-energy, micronutrients)   [] New Dx:    EDUCATION:  [] Yes:  [x] Not appropriate/warranted    NUTRITION CARE PLAN:  1. Enteral nutrition + Supplements: Change formula to Vital 1.5 as patient on multiple pressors. When enteral nutrition rate ready to advance, recommend increasing by 10ml q6hr as tolerated to goal rate Vital 1.5 @ 60ml/hr x 24hr with Prosource TF Free 1x/day; provides at goal 1440ml formula, 2250kcal, 112g protein, 1100ml free water; meets 28.8kcal/kg and 1.44g/kg protein based on IBW 172lb/78kg. Defer free water flushes to medical team.   2. Multivitamin/mineral supplementation: Continue nephro-elicia for wound healing, recommend adding Vitamin C while patient on Continuous renal replacement therapy and pending no medical contraindications.  3. Monitor for malnutrition given ongoing Inadequate energy intake.    [] Achieved - Continue current nutrition intervention(s)  [] Current medical condition precludes nutrition intervention at this time.    MONITORING AND EVALUATION:   RD remains available upon request and will follow up per protocol.    Corinne Lima RDN, CDN - available via [facility] TEAMS

## 2025-01-06 NOTE — PROGRESS NOTE ADULT - SUBJECTIVE AND OBJECTIVE BOX
[facility]    Dr. Nica Styles     Office [phone] (9 am to 5 pm)  Service: [phone] (5pm to 9am)  Also Available on TEAMS      RENAL PROGRESS NOTE: DATE OF SERVICE 01-06-25 @ 12:07    Patient is a 54y old  Male who presents with a chief complaint of CAD (06 Jan 2025 06:59)      Patient seen and examined at bedside. No chest pain/sob    VITALS:  T(F): 99.7 (01-06-25 @ 08:00), Max: 99.7 (01-06-25 @ 04:00)  HR: 92 (01-06-25 @ 11:45)  BP: --  RR: 23 (01-06-25 @ 11:45)  SpO2: 100% (01-06-25 @ 11:45)  Wt(kg): --    01-05 @ 07:01  -  01-06 @ 07:00  --------------------------------------------------------  IN: 2024.2 mL / OUT: 3661 mL / NET: -1636.8 mL    01-06 @ 07:01  -  01-06 @ 12:07  --------------------------------------------------------  IN: 346.3 mL / OUT: 233 mL / NET: 113.3 mL          PHYSICAL EXAM:  Constitutional: NAD  Neck: No JVD  Respiratory: CTAB, no wheezes, rales or rhonchi  Cardiovascular: S1, S2, RRR  Gastrointestinal: BS+, soft, NT/ND  Extremities: No peripheral edema    Hospital Medications:   MEDICATIONS  (STANDING):  albuterol/ipratropium for Nebulization 3 milliLiter(s) Nebulizer every 6 hours  aMIOdarone Infusion 0.5 mG/Min (16.7 mL/Hr) IV Continuous <Continuous>  aspirin  chewable 81 milliGRAM(s) Oral daily  bisacodyl Suppository 10 milliGRAM(s) Rectal once  calcium acetate 667 milliGRAM(s) Oral three times a day with meals  chlorhexidine 0.12% Liquid 15 milliLiter(s) Oral Mucosa every 12 hours  chlorhexidine 2% Cloths 1 Application(s) Topical daily  clopidogrel Tablet 75 milliGRAM(s) Oral daily  colchicine 0.3 milliGRAM(s) Oral daily  CRRT Treatment    <Continuous>  dexMEDEtomidine Infusion 0.5 MICROgram(s)/kG/Hr (10.6 mL/Hr) IV Continuous <Continuous>  dextrose 50% Injectable 50 milliLiter(s) IV Push every 15 minutes  dextrose 50% Injectable 25 milliLiter(s) IV Push every 15 minutes  DOBUTamine Infusion 5 MICROgram(s)/kG/Min (6.38 mL/Hr) IV Continuous <Continuous>  epoetin ignacia (EPOGEN) Injectable 08716 Unit(s) IV Push <User Schedule>  insulin lispro (ADMELOG) corrective regimen sliding scale   SubCutaneous every 6 hours  lidocaine   4% Patch 2 Patch Transdermal daily  meropenem  IVPB 1000 milliGRAM(s) IV Intermittent every 8 hours  Nephro-elicia 1 Tablet(s) Oral daily  norepinephrine Infusion 0.05 MICROgram(s)/kG/Min (7.98 mL/Hr) IV Continuous <Continuous>  pantoprazole  Injectable 40 milliGRAM(s) IV Push daily  polyethylene glycol 3350 17 Gram(s) Oral two times a day  PrismaSATE Dialysate BGK 4 / 2.5 5000 milliLiter(s) (1000 mL/Hr) CRRT <Continuous>  PrismaSOL Filtration BGK 4 / 2.5 5000 milliLiter(s) (800 mL/Hr) CRRT <Continuous>  PrismaSOL Filtration BGK 4 / 2.5 5000 milliLiter(s) (200 mL/Hr) CRRT <Continuous>  senna 2 Tablet(s) Oral at bedtime  sodium chloride 0.9%. 1000 milliLiter(s) (10 mL/Hr) IV Continuous <Continuous>  vasopressin Infusion 0.03 Unit(s)/Min (4.5 mL/Hr) IV Continuous <Continuous>      LABS:  01-06    133[L]  |  96  |  31[H]  ----------------------------<  168[H]  3.8   |  19[L]  |  2.45[H]    Ca    8.7      06 Jan 2025 00:35  Phos  2.7     01-06  Mg     2.5     01-06    TPro  6.4  /  Alb  3.2[L]  /  TBili  2.3[H]  /  DBili      /  AST  301[H]  /  ALT  565[H]  /  AlkPhos  122[H]  01-06    Creatinine Trend: 2.45 <--, 3.23 <--, 3.81 <--, 4.91 <--, 5.93 <--, 6.32 <--, 6.21 <--, 5.03 <--, 4.67 <--, 3.71 <--, 3.36 <--, 5.24 <--    Albumin: 3.2 g/dL (01-06 @ 00:35)  Phosphorus: 2.7 mg/dL (01-06 @ 00:35)                              8.7    23.31 )-----------( 70       ( 06 Jan 2025 00:35 )             27.3     Urine Studies:  Urinalysis - [01-06-25 @ 00:35]      Color  / Appearance  / SG  / pH       Gluc 168 / Ketone   / Bili  / Urobili        Blood  / Protein  / Leuk Est  / Nitrite       RBC  / WBC  / Hyaline  / Gran  / Sq Epi  / Non Sq Epi  / Bacteria       Iron 29, TIBC 143, %sat 20      [12-19-24 @ 05:00]  Ferritin 904      [12-19-24 @ 05:00]  TSH 4.75      [12-24-24 @ 06:50]    HBsAg Nonreact      [12-19-24 @ 05:00]      RADIOLOGY & ADDITIONAL STUDIES:

## 2025-01-06 NOTE — PROGRESS NOTE ADULT - ASSESSMENT
54M with PMH of HTN, HLD, ESRD on HD MWF via LUE AVF, ascites (requiring repeat paracenteses q4-6wks), NICM with moderate LV dysfunction w/ EF 35-40%() and CAD s/p atherectomy + SALLIE to D1(2024) presents to [facility] transferred from [facility]. [facility] with shortness of breath. Of note he was recently admitted from - for acute cholecystitis s/p cholecystectomy. In [facility] ED, pt was hypoxic to 86% on RA, trop 1.7K, EKG no new changes, CXR fluid overload. Admitted for AHRF 2/2 fluid overload. Pt received HD on , ,  and . DAPT was resumed, TTE showed improvement in LVEF to 40-45%. Stress test was abnormal with 1mm inferior STD, reversible ischemia in the inferior wall and fixed defects in the lateral, anterior and apical walls with post stress EF of 24%.     Pt was then transferred to [facility] for cardiac cath which showed pLAD 70% ISR, mLCx 70%, D1 severe dz.     Patient now transferred to [facility] CTS Dr. Rivers for CABG eval.    # CAD s/p NSTEMI  Hx CAD s/p PCI LAD   Presented with ADHF elevated troponins  Positive NST  -Cath- pLAD 70% ISR, mLCx 70%, D1 severe dz.   TTE EF 35% Severe TRWas on Plavix-p2y12- 321 been off Plavix since -POD#1-C3L free LIMA-LAD; SVG-Diag; QTX-uoeuMG-Rzkwfswzm on  Sinus rhythm,Amio for AF prophylaxis  -OOB off  Sinus rhythm   -POD#3-On /pressors-EF 25-30% RV failure with transaminitis-Sinus Rhythm  -POD#4-Was intubated for hypoxia-gradual hypotension on /pressors had IABP inserted this morning  -POD#5-s/p IABP insertion, sepsis/septic shock due to GNR/ECOLI HD cath placed   Bronched yesterday 1/3 - minimal secretions with rust-tinged sputum   Paracentesis for suspected bacterial peritonitis-No organisms noted  Transfused 1u pbrcs overnight  Remains on inoptropes and pressors with IABP at 1:1  ESBL-E Coli bacteremia on meropenam    - POD#7 Atrial Fib ,remains intubated weaning IABP 1:3,off pressors on CRRT      # Acute on Chronic Systolic Heart Failure  EF-35% ,severe TR  Had HD post op  GDMT post op  LVEF improved post bypass but now at 23-30%-BiV dysfunction on  now off pressors  -TTE EF 40%,trace effusion  ECG c/w pericarditis-on colchicine     Vascular evaluated  AVGraft /?steal causing RV failure-'' Very low suspicion of steal Sd and AVF causing current clinical state. ''      # Ascites  Hx chronic ascites  Has drainage q4-6 weeks  Last drained -4600mL  ? ascites related to severe TR  Had mvjkicsmmitp-Nwfkyde-ec growth   Monitor      # ESRD  Now on CRRT

## 2025-01-06 NOTE — CHART NOTE - NSCHARTNOTEFT_GEN_A_CORE
Patient seen and examined at bedside with Dr. Garcia.    LUE fistula with palpable thrill. Duplex reviewed, flows within the fistula not suggestive of inciting steal syndrome.      Recommendations:  - continue to use fistula for HD    Vascular Surgery  [id number]

## 2025-01-06 NOTE — PROGRESS NOTE ADULT - SUBJECTIVE AND OBJECTIVE BOX
MR#[id number]  PATIENT NAME:RAAD CANO    DATE OF SERVICE: 25 @ 06:59  Patient was seen and examined by Solo Quick MD on    25 @ 06:59 .  Interim events noted.Consultant notes ,Labs,Telemetry reviewed by me       HOSPITAL COURSE: HPI:  54M with PMH of HTN, HLD, ESRD on HD MWF via LUE AVF, ascites (requiring repeat paracenteses q4-6wks), NICM with moderate LV dysfunction w/ EF 35-40%() and CAD s/p atherectomy + SALLIE to D1(2024) presents to [facility] transferred from [facility]. Patient initially presented to [facility] ED with shortness of breath. Of note he was recently admitted from - for acute cholecystitis s/p cholecystectomy. In [facility] ED, pt was hypoxic to 86% on RA, trop 1.7K, EKG no new changes, CXR fluid overload. Admitted for AHRF 2/2 fluid overload. Pt received HD on , ,  and . DAPT was resumed, TTE showed improvement in LVEF to 40-45%. Stress test was abnormal with 1mm inferior STD, reversible ischemia in the inferior wall and fixed defects in the lateral, anterior and apical walls with post stress EF of 24%. Pt was then transferred to [facility] for cardiac cath which showed pLAD 70% ISR, mLCx 70%, D1 severe dz. Patient now transferred to [facility] CTS Dr. Rivers for CABG eval. Patient denies any current SOB or chest pain on admission. Otherwise denies headaches, abdominal pain, urinary or bowel changes, fevers, chills, N/V/D, sick contacts.  (24 Dec 2024 05:07)      NTERIM EVENTS:Patient seen at bedside ,interim events noted.   Cardiac cath  pLAD 70% ISR, mLCx 70%, D1 severe dz.   -POD#1-C3L free LIMA-LAD; SVG-Diag; SVG-leftPL Awake OOB extubated Sinus rhythm on Dobutamine gtt  - Was intubated last night for airway protection s/p bronchoscopy This morning had IABP inserted  remains sedated intubated Sinus Rhythm  01/04- s/p IABP insertion, sepsis/septic shock due to GNR/ECOLI -Paracentesis for suspected bacterial peritonitis  Transfused 1u pbrcs overnight  Remains on inoptropes and pressors with IABP at 1:1  -Intunated on IABP 1:1,Alex@20ppm,on ,weaning pressors  -POD#7-Remains intubated on CRRT-IABP 1:3,off pressors on Dobutrex in Atrial Fibrillation      MEDICATIONS  (STANDING):  albuterol/ipratropium for Nebulization 3 milliLiter(s) Nebulizer every 6 hours  aMIOdarone Infusion 0.5 mG/Min (16.7 mL/Hr) IV Continuous <Continuous>  aspirin  chewable 81 milliGRAM(s) Oral daily  bisacodyl Suppository 10 milliGRAM(s) Rectal once  calcium acetate 667 milliGRAM(s) Oral three times a day with meals  chlorhexidine 0.12% Liquid 15 milliLiter(s) Oral Mucosa every 12 hours  chlorhexidine 2% Cloths 1 Application(s) Topical daily  clopidogrel Tablet 75 milliGRAM(s) Oral daily  colchicine 0.3 milliGRAM(s) Oral daily  CRRT Treatment    <Continuous>  dexMEDEtomidine Infusion 0.5 MICROgram(s)/kG/Hr (10.6 mL/Hr) IV Continuous <Continuous>  dextrose 50% Injectable 50 milliLiter(s) IV Push every 15 minutes  dextrose 50% Injectable 25 milliLiter(s) IV Push every 15 minutes  DOBUTamine Infusion 2.5 MICROgram(s)/kG/Min (3.19 mL/Hr) IV Continuous <Continuous>  epoetin ignacia (EPOGEN) Injectable 98234 Unit(s) IV Push <User Schedule>  insulin lispro (ADMELOG) corrective regimen sliding scale   SubCutaneous every 6 hours  lidocaine   4% Patch 2 Patch Transdermal daily  meropenem  IVPB 1000 milliGRAM(s) IV Intermittent every 8 hours  Nephro-elicia 1 Tablet(s) Oral daily  pantoprazole  Injectable 40 milliGRAM(s) IV Push daily  polyethylene glycol 3350 17 Gram(s) Oral two times a day  PrismaSATE Dialysate BGK 4 / 2.5 5000 milliLiter(s) (1000 mL/Hr) CRRT <Continuous>  PrismaSOL Filtration BGK 4 / 2.5 5000 milliLiter(s) (800 mL/Hr) CRRT <Continuous>  PrismaSOL Filtration BGK 4 / 2.5 5000 milliLiter(s) (200 mL/Hr) CRRT <Continuous>  senna 2 Tablet(s) Oral at bedtime  sodium bicarbonate 650 milliGRAM(s) Oral every 8 hours  sodium chloride 0.9%. 1000 milliLiter(s) (10 mL/Hr) IV Continuous <Continuous>  vasopressin Infusion 0.03 Unit(s)/Min (4.5 mL/Hr) IV Continuous <Continuous>    MEDICATIONS  (PRN):  fentaNYL    Injectable 12.5 MICROGram(s) IV Push every 2 hours PRN Moderate Pain (4 - 6)  fentaNYL    Injectable 25 MICROGram(s) IV Push every 3 hours PRN Severe Pain (7 - 10)            REVIEW OF SYSTEMS:  Constitutional: [ ] fever, [ ]weight loss,  [ ]fatigue [ ]weight gain  Eyes: [ ] visual changes  Respiratory: [ ]shortness of breath;  [ ] cough, [ ]wheezing, [ ]chills, [ ]hemoptysis  Cardiovascular: [ ] chest pain, [ ]palpitations, [ ]dizziness,  [ ]leg swelling[ ]orthopnea[ ]PND  Gastrointestinal: [ ] abdominal pain, [ ]nausea, [ ]vomiting,  [ ]diarrhea [ ]Constipation [ ]Melena  Genitourinary: [ ] dysuria, [ ] hematuria [ ]Vigil  Neurologic: [ ] headaches [ ] tremors[ ]weakness [ ]Paralysis Right[ ] Left[ ]  Skin: [ ] itching, [ ]burning, [ ] rashes  Endocrine: [ ] heat or cold intolerance  Musculoskeletal: [ ] joint pain or swelling; [ ] muscle, back, or extremity pain  Psychiatric: [ ] depression, [ ]anxiety, [ ]mood swings, or [ ]difficulty sleeping  Hematologic: [ ] easy bruising, [ ] bleeding gums    [ ] All remaining systems negative except as per above.   [x ]Unable to obtain.  [x] No change in ROS since admission      Vital Signs Last 24 Hrs  T(C): 37.6 (2025 04:00), Max: 37.6 (2025 04:00)  T(F): 99.7 (2025 04:00), Max: 99.7 (2025 04:00)  HR: 85 (2025 06:45) (73 - 129)  BP: --98/47  RR: 16 (2025 06:45) (11 - 26)  SpO2: 100% (2025 06:45) (87% - 100%)    Parameters below as of 2025 06:10  Patient On (Oxygen Delivery Method): ventilator      I&O's Summary    2025 07:01  -  2025 07:00  --------------------------------------------------------  IN: 1387.1 mL / OUT: 4351 mL / NET: -2963.9 mL    2025 07:01  -  2025 06:59  --------------------------------------------------------  IN: 2024.2 mL / OUT: 3378 mL / NET: -1353.8 mL        PHYSICAL EXAM:  General: No acute distress BMI-30  HEENT: EOMI, PERRL  Neck: Supple, [ ] JVD  Lungs: Equal air entry bilaterally; [ ] rales [ ] wheezing [ ] rhonchi  Heart: Irregular rate and rhythm; [x ] murmur   2/6 [ x] systolic [ ] diastolic [ ] radiation[ ] rubs [ ]  gallops  Abdomen: Nontender, bowel sounds present  Extremities: No clubbing, cyanosis, [ ] edema [ ]Pulses  equal and intact  Nervous system:  Sedated intubated  Skin: No rashes or lesions    LABS:      133[L]  |  96  |  31[H]  ----------------------------<  168[H]  3.8   |  19[L]  |  2.45[H]    Ca    8.7      2025 00:35  Phos  2.7     -  Mg     2.5     -    TPro  6.4  /  Alb  3.2[L]  /  TBili  2.3[H]  /  DBili  x   /  AST  301[H]  /  ALT  565[H]  /  AlkPhos  122[H]      Creatinine Trend: 2.45<--, 3.23<--, 3.81<--, 4.91<--, 5.93<--, 6.32<--                        8.7    23.31 )-----------( 70       ( 2025 00:35 )             27.3     PT/INR - ( 2025 00:35 )   PT: 17.2 sec;   INR: 1.50 ratio         PTT - ( 2025 00:35 )  PTT:34.3 sec    Culture - Body Fluid with Gram Stain (25 @ 15:34)   Gram Stain: polymorphonuclear leukocytes seen   No organisms seen by cytocentrifuge  Specimen Source: Peritoneal  Culture Results: No growth      Xray Chest 1 View- PORTABLE-Routine (Xray Chest 1 View- PORTABLE-Routine in AM.) (25 @ 02:46) >  FINDINGS:  2025 at 4:29 PM:  Enteric tube tip in the stomach. Endotracheal tube tip in the midtrachea.  Right IJ central venous catheter, with tip in the SVC.  Intra-aortic balloon pump marker unchanged in position.  The heart is similarly enlarged.. Left lower chest tube again seen.  Perihilar opacities. Opacification of the left lung may be secondary to  layering pleural effusion. Similar left lower lung field opacities.  There is no pneumothorax.    2025 at 1:41 AM:  No significant interval changes.    IMPRESSION:  Enteric tube tip in the stomach.    Findings consistent with mild pulmonary edema, similar to prior. Small  left pleural effusion with associated atelectasis, unchanged.          TTE W or WO Ultrasound Enhancing Agent (25 @ 09:59) >  CONCLUSIONS:      1. Left ventricular systolic function is moderately decreased with an ejection fraction visually estimated at 40 %.   2. There is hypokinesis of the inferior and inferolateral walls.   3. Enlarged right ventricular cavity size and reduced right ventricular systolic function.   4.Pericardium:-There ira trace pericardial effusion noted adjacent to the posterior left ventricle.        12 Lead ECG (25 @ 00:29) >  NORMAL SINUS RHYTHM  LOW VOLTAGE QRS  NON-SPECIFIC INTRA-VENTRICULAR CONDUCTION DELAY  ST ELEVATION-c/w PERICARDITIS  ABNORMAL ECG

## 2025-01-06 NOTE — PROGRESS NOTE ADULT - ASSESSMENT
54M with PMH of HTN, HLD, ESRD on HD MWF via LUE AVF, ascites (requiring repeat paracenteses q4-6wks), NICM with moderate LV dysfunction w/ EF 35-40%(2023) and CAD s/p atherectomy + SALLIE to D1(4/11/2024) presents to [facility] transferred from [facility] for CABG eval  Nephro on [facility] for HD    ESRD on HD   Regular schedule MWF   Access LUE AVF  [facility]  Nephrologist Dr. Bailey  Last HD on 12/24  S/p HD on 12/27 12/29, 12/30 for hyperkalemia and 12/31  2 L PUF On 01/02/2024  However hospital course now complicated by Cardiogenic shock now on multiple pressors,   CRRT initiated 01/03, patient hemodynamically unstable and continues to require CRRT  Keep MAP>65  Renal Diet  Consent in physical chart    Hyper/Hypokalemia  Monitor K, will change dialysate fluid as needed    HTN  currently on pressure support  monitor bp     CKD MBD  Can send a PTH  Ca and Phos daily    Anemia  CRISTIANE with HD  Transfuse if hgb <7    CAD with multivessel disease  s/p CABG 12/30  CTICU following

## 2025-01-06 NOTE — ADVANCED PRACTICE NURSE CONSULT - ASSESSMENT
Arrived on unit, patient was found lying in a low air loss pressure redistribution support surface style bed. Mr. Torres is intubated, with minimal arousal. Unable to turn independently, staff assistance x 2. Once turned, lexii care was provided. Once provided, able to view his skin.     Skin assessment reveals; B/L Buttocks non-blanchable deep red / maroon / purple discoloration, consistent with a deep tissue injury, size approximately 10.5 cm x 10.0 cm x 0.0 cm. Applied Triad wound paste at bed side to create a barrier on intact skin to prevent further damage from bodily fluids, friction, and adhesive trauma that could worsen a deep tissue injury.    Right Upper Back; non-blanchable deep red / maroon / purple discoloration, consistent with a deep tissue injury, size approximately 7.5 cm x 4.5 cm x 0.0 cm. Applied Cavilon barrier wipeto create a barrier on intact skin to prevent further damage from bodily fluids, friction, and adhesive trauma that could worsen a deep tissue injury.   Etiology of Sacrum/buttocks, right upper back wounds is Non-Pressure Skin Failure. All appropriate Pressure Relief / Pressure Redistribution Care including but not limited to alternating air with low air loss support surface, frequent turning and repositioning, use of positioning devices, incontinence management and nutrition support. Despite optimizing pressure injury prevention measures, a state of hypoperfusion has and does exist related to kidney and respiratory failure. Skin Failure development is not related to pressure or shear but to poor tissue perfusion where blood is shunted from the skin and skeletal muscle and there is not enough time for reperfusion even with consistent positioning. The cause of skin failure is lack of blood flow needed to remove waste products and restore tissue oxygen.   Bilateral heels with blanchable erythema and intact skin. Wearing complete cair air fluidized boots; ensuring that the soles of the feet are not resting on the foot board of the bed.     RN was educated on the importance of turning and positioning every 2 hours. The importance of keeping his skin clean and dry and to offload feet/heels, and optimal nutrition.        When the consultation was completed, the patient was left in a right sided position, with side rails up, call bell within reach, and bed in lowest position. Discussed plan of care with MARIA ELENA Claros.   Arrived on unit, patient was found lying in a low air loss pressure redistribution support surface style bed. Mr. Torres is intubated, with minimal arousal. Unable to turn independently, staff assistance x 2. Once turned, lexii care was provided. Once provided, able to view his skin.     Skin assessment reveals; B/L Buttocks non-blanchable deep red / maroon / purple discoloration, consistent with a deep tissue injury, size approximately 10.5 cm x 10.0 cm x 0.0 cm. Cleansed with incontinence cleanser, pat dry, and applied Triad topical wound paste at bed side to maintain moisture and protect the skin.   Right Upper Back; non-blanchable deep red / maroon / purple discoloration, consistent with a deep tissue injury, size approximately 7.5 cm x 4.5 cm x 0.0 cm. Applied Cavilon barrier wipeto create a barrier on intact skin to prevent further damage from bodily fluids, friction, and adhesive trauma that could worsen a deep tissue injury.   Etiology of Sacrum/buttocks, right upper back wounds is Non-Pressure Skin Failure. All appropriate Pressure Relief / Pressure Redistribution Care including but not limited to alternating air with low air loss support surface, frequent turning and repositioning, use of positioning devices, incontinence management and nutrition support. Despite optimizing pressure injury prevention measures, a state of hypoperfusion has and does exist related to kidney and respiratory failure. Skin Failure development is not related to pressure or shear but to poor tissue perfusion where blood is shunted from the skin and skeletal muscle and there is not enough time for reperfusion even with consistent positioning. The cause of skin failure is lack of blood flow needed to remove waste products and restore tissue oxygen.   Bilateral heels with blanchable erythema and intact skin. Wearing complete cair air fluidized boots; ensuring that the soles of the feet are not resting on the foot board of the bed.     RN was educated on the importance of turning and positioning every 2 hours. The importance of keeping his skin clean and dry and to offload feet/heels, and optimal nutrition.        When the consultation was completed, the patient was left in a right sided position, with side rails up, call bell within reach, and bed in lowest position. Discussed plan of care with Harlan Mixon).

## 2025-01-06 NOTE — PROCEDURE NOTE - ADDITIONAL PROCEDURE DETAILS
PROCEDURE NOTE  REMOVAL OF INTRA AORTIC BALLOON PUMP    The IABP (intra-aortic balloon pump) was weaned according to protocol.  Hemodynamics remained stable.  Pulses in the left lower extremity are audible by doppler.  The patient was placed in the supine position. The insertion site was identified and the sutures were removed. The IABP was turned off and the balloon deflated. The IABP was then removed. Direct pressure was applied for 45  minutes. A sandbag was applied and is to remain in place for four hours.    Monitoring of the left groin and both lower extremities including neuro-vascular checks and vital signs every 15 minutes  x4, then every 30 minutes x 2, then every 1 hr x 4 was ordered.    Complications: None

## 2025-01-06 NOTE — PROGRESS NOTE ADULT - SUBJECTIVE AND OBJECTIVE BOX
Patient seen and examined at the bedside.    Remained critically ill on continuous ICU monitoring.    OBJECTIVE:  Vital Signs Last 24 Hrs  T(C): 38.1 (06 Jan 2025 16:00), Max: 38.1 (06 Jan 2025 16:00)  T(F): 100.6 (06 Jan 2025 16:00), Max: 100.6 (06 Jan 2025 16:00)  HR: 91 (06 Jan 2025 19:00) (72 - 121)  BP: 121/61 (06 Jan 2025 16:00) (121/61 - 121/61)  BP(mean): 82 (06 Jan 2025 16:00) (82 - 82)  RR: 17 (06 Jan 2025 19:00) (11 - 26)  SpO2: 100% (06 Jan 2025 19:00) (91% - 100%)    Parameters below as of 06 Jan 2025 18:13  Patient On (Oxygen Delivery Method): ventilator          Physical Exam:   General: Intubated  Neurology: Sedated  Eyes: bilateral pupils equal and reactive   ENT/Neck: Neck supple, trachea midline, No JVD   Respiratory: Clear bilaterally   CV: S1S2, no murmurs         NSR  Abdominal: Soft, NT, ND +BS   Extremities: 1-2+ pedal edema noted, + peripheral pulses   Skin: No Rashes, Hematoma, Ecchymosis                           Assessment:  54M with PMH of HTN, HLD, ESRD on HD MWF via LUE AVF, ascites (requiring repeat paracenteses q4-6wks), NICM with moderate LV dysfunction w/ EF 35-40%(2023) and CAD s/p atherectomy + SALLIE to D1(4/11/2024) presents to [facility] transferred from [facility].    Multi-CAD s/p C3L on 12/30/24  Hemodynamic instability  Hypovolemia  Lactic acidosis  Leukocytosis  Post op respiratory insufficiency  Acute blood loss anemia  Thrombocytopenia      Plan:   ***Neuro***  [x] Sedated with [x] Precedex  Post operative neuro assessment   Tylenol, Robaxin, PRN Fentanyl, and PRN Oxycodone for pain management.    ***Cardiovascular***  Invasive hemodynamic monitoring, assess perfusion indices   JR / CVP 10 / MAP 71 / Hct 25.6% / Lactate 1.7  [x] Levophed - 0.05 mcg/kg/min   [x] Vasopressin - 0.03 Unit(s)/Min   [x] Dobutamine - 5 mcg/kg/min  [x] Amiodarone 0.5 mcg/kg/min   [x]  L. Fem IABP 1:1  Continuos reassessment of hemodynamics   Monitor chest tube output. -- Removed Mediastinal CTx2 (1/2/25)  Serial EKG and cardiac enzymes   Repeat ECHO performed (1/3) -- Decreased BiV function, Severe TR, and LVEF 35%     ***Pulmonary***  [x] Alex 20 PPM  Post op vent management   Titration of FiO2 and PEEP, follow SpO2, CXR, blood gasses   Continue bronchodilators as needed.  Bronched today (1/4) - fair amount of thick, yellow secretions  CXR stable, Repeat CXR appears the same      Mode: CPAP with PS  FiO2: 40  PEEP: 8  PS: 15  MAP: 12  PIP: 23              ***GI***  [x] On TF's   [x] Protonix   Bowel regimen with Miralax and Senna     ***Renal***  [x] ESRD on HD   Continue to monitor I/Os, BUN/Creatinine.   Replete lytes PRN  Yaritza present  CVVH - currently net even (goal net negative 100cc/hr)   Nephro-Lisette for renal support    ***ID***  Empiric Meropenem  Paracentesis performed (1/3);   + E. Coli Cx in Sputum and Blood - ESBL - sensitive to meropenem     ***Endocrine***  [x] DM2 : HbA1c 5.6%                - [x] ISS             - Need tight glycemic control to prevent wound infection.      Patient requires continuous monitoring with bedside rhythm monitoring, pulse oximetry monitoring, and continuous central venous and arterial pressure monitoring; and intermittent blood gas analysis. Care plan discussed with the ICU care team.   Patient remained critical, at risk for life threatening decompensation.    I have spent 45 minutes providing critical care management to this patient.    By signing my name below, I, Sonia Sánchez, attest that this documentation has been prepared under the direction and in the presence of Judith Marie NP   Electronically signed: Evelio Gomez, 01-06-25 @ 19:52    I, Judith Marie , personally performed the services described in this documentation. all medical record entries made by the scribe were at my direction and in my presence. I have reviewed the chart and agree that the record reflects my personal performance and is accurate and complete  Electronically signed: Judith Marie NP

## 2025-01-06 NOTE — ADVANCED PRACTICE NURSE CONSULT - REASON FOR CONSULT
Wound consultation requested to assess patient's skin status; right buttocks suspected deep tissue injury.  -s/p CABG 12/30/24  -intubated for hypoxia 1/2/25  -s/p paracentesis 1/3/25  -s/p IABP insertion 1/4/25  -sepsis  -shock Wound consultation requested to assess patient's skin status; right buttocks suspected deep tissue injury.  HPI: 55 Y/O M with PMH of HTN, HLD, ESRD on HD MWF via LUE AVF, ascites (requiring repeat paracenteses q4-6wks), NICM with moderate LV dysfunction w/ EF 35-40%(2023) and CAD s/p atherectomy + SALLIE to D1(4/11/2024) presents to [facility] transferred from [facility]. Patient initially presented to [facility] ED with shortness of breath. Of note he was recently admitted from 09/27-12/02 for acute cholecystitis s/p cholecystectomy. In [facility] ED, pt was hypoxic to 86% on RA, trop 1.7K, EKG no new changes, CXR fluid overload. Admitted for AHRF 2/2 fluid overload. Pt received HD on 12/18, 12/19, 12/20 and 12/22. DAPT was resumed, TTE showed improvement in LVEF to 40-45%. Stress test was abnormal with 1mm inferior STD, reversible ischemia in the inferior wall and fixed defects in the lateral, anterior and apical walls with post stress EF of 24%. Pt was then transferred to [facility] for cardiac cath which showed pLAD 70% ISR, mLCx 70%, D1 severe dz. Patient now transferred to [facility] CTS Dr. Rivers for CABG eval.   -s/p CABG 12/30/24  -intubated for hypoxia 1/2/25  -s/p paracentesis 1/3/25  -s/p IABP insertion 1/4/25  -sepsis  -shock

## 2025-01-06 NOTE — PROGRESS NOTE ADULT - ASSESSMENT
54M with PMH of HTN, HLD, ESRD on HD MWF via LUE AVF, ascites (requiring repeat paracenteses q4-6wks), NICM with moderate LV dysfunction w/ EF 35-40%() and CAD s/p atherectomy + SALLIE to D1(2024) presents to [facility] transferred from [facility]. Patient initially presented to [facility] ED with shortness of breath. Of note he was recently admitted from - for acute cholecystitis s/p cholecystectomy. In [facility] ED, pt was hypoxic to 86% on RA, trop 1.7K, EKG no new changes, CXR fluid overload. Admitted for AHRF 2/2 fluid overload. Pt received HD on , ,  and . DAPT was resumed, TTE showed improvement in LVEF to 40-45%. Stress test was abnormal with 1mm inferior STD, reversible ischemia in the inferior wall and fixed defects in the lateral, anterior and apical walls with post stress EF of 24%. Pt was then transferred to [facility] for cardiac cath which showed pLAD 70% ISR, mLCx 70%, D1 severe dz. Patient now transferred to [facility] CTS Dr. Rivers for CABG eval. Patient denies any current SOB or chest pain on admission. Otherwise denies headaches, abdominal pain, urinary or bowel changes, fevers, chills, N/V/D, sick contacts.  (24 Dec 2024 05:07)   VSS  CP free   HD via avf  for daily HD pre op- on lovenox 30 qd    HD today continue with present meds. Plan for CABG possibleTVR next week   vss; rsr 55-80; hd today w/ 1 unit prbc; pt to be dialzyed also this  w/ another 1 unit prbc   VSS; RSR 60-80; continue asa/bb/ statin; HD in am - transfuse 1 unit prbc with HD; d/c lovenox after am dose sun    - Pt underwent  C3L free LIMA-LAD; SVG-Diag; SVG-leftPL  Pt given cefuroxime perioperatively   pt extubated   . pt with hypotension and ECHO showing Systolic HF/depressed BIV Function, currently supported with /pressors.  Labs this evening showing transaminitis  1/2 -3 pt hypotensive, febrile and reintubated  Pt pancultured. and started on zosyn- meropenem and gent  pt found to have Gram negative bacteremia    E coli +ESBL bacteremia        A/P  #sepsis  ? source - many options. Pt with abnormal u/a but is a dialysis pt so hard to interpret. Pt with h//o SBP- pt with ascites   s/p  paracentesis 1/3  cultures No Growth to date  - no organisms notes on gram stain  could be from lungs but ? effusion vs infiltrate.  Continue meropenem  if going to change to CVVHD need to increase the meropenem dose    #elevated LFTs  likely shock liver  acute  hepatitis serology- negative   trend  avoid hepatotoxic meds   still quite elevated but improving     #leukopenia  likely from sepsis  improved today    #thrombocytopenia   HIt ab negative  Likely from sepsis vs from IABP or drugs   trend         Marla Champion M.D. ,   please reach via teams   If no answer, or after 5PM/ weekends,  then please call  [phone]    Assessment and plan discussed with the primary team .

## 2025-01-06 NOTE — PROGRESS NOTE ADULT - SUBJECTIVE AND OBJECTIVE BOX
Patient seen and examined at the bedside.    Remains critically ill on continuous ICU monitoring.      Brief Summary:  53 yo M with multiple medical problems including CAD s/p PCI in April 2024 s/p CABG x 3 on 12/30/24 1/2: Intubated for hypoxia & left lung white out s/p awake bronch with removal of copious mucopurulent secretions  1/4: s/p IABP insertion, sepsis/septic shock due to E coli       24 Hour events:      Objective:  Vital Signs Last 24 Hrs  T(C): 37.6 (06 Jan 2025 08:00), Max: 37.6 (06 Jan 2025 04:00)  T(F): 99.7 (06 Jan 2025 08:00), Max: 99.7 (06 Jan 2025 04:00)  HR: 89 (06 Jan 2025 07:45) (74 - 129)  BP: --  BP(mean): --  RR: 18 (06 Jan 2025 07:45) (11 - 26)  SpO2: 98% (06 Jan 2025 07:45) (87% - 100%)    Parameters below as of 06 Jan 2025 08:00  Patient On (Oxygen Delivery Method): ventilator    O2 Concentration (%): 40    Mode: AC/ CMV (Assist Control/ Continuous Mandatory Ventilation),   RR (machine): 14  TV (machine): 450  FiO2: 40  PEEP: 8  ITime: 1  MAP: 13  PIP: 29              Physical Exam:   General: Intubated  Neurology: Sedated   Respiratory: Clear bilaterally   CV: Sinus  Abdominal: Soft, Nontender  Extremities: Warm, well-perfused  IABP site ok.  -------------------------------------------------------------------------------------------------------------------------------    Labs:                        8.7    23.31 )-----------( 70       ( 06 Jan 2025 00:35 )             27.3     01-06    133[L]  |  96  |  31[H]  ----------------------------<  168[H]  3.8   |  19[L]  |  2.45[H]    Ca    8.7      06 Jan 2025 00:35  Phos  2.7     01-06  Mg     2.5     01-06    TPro  6.4  /  Alb  3.2[L]  /  TBili  2.3[H]  /  DBili  x   /  AST  301[H]  /  ALT  565[H]  /  AlkPhos  122[H]  01-06    LIVER FUNCTIONS - ( 06 Jan 2025 00:35 )  Alb: 3.2 g/dL / Pro: 6.4 g/dL / ALK PHOS: 122 U/L / ALT: 565 U/L / AST: 301 U/L / GGT: x           PT/INR - ( 06 Jan 2025 00:35 )   PT: 17.2 sec;   INR: 1.50 ratio         PTT - ( 06 Jan 2025 00:35 )  PTT:34.3 sec  ABG - ( 06 Jan 2025 07:38 )  pH, Arterial: 7.39  pH, Blood: x     /  pCO2: 34    /  pO2: 207   / HCO3: 21    / Base Excess: -3.8  /  SaO2: 99.5      ------------------------------------------------------------------------------------------------------------------------------  Assessment:  53 yo M s/p CABG x 3 on 12/30/24    Postop acute respiratory failure  Cardiogenic and Septic shock  Acute blood loss anemia  Transaminitis  Thrombocytopenia   ESRD on HD  E coli bacteremia  Leukocytosis     Plan:    ***Neuro***  Sedated with Precedex  Postoperative acute pain control with prn Fentanyl.    ***Cardiovascular***  s/p CABG x 3  R. fem IABP 1:1  Wean Vasopressin and Levophed for MAP 65 mm Hg  Remains on Dobutamine, will wean slightly  Amiodarone for a fib prophylaxis   Nitric oxide at 20 ppm  ASA / Plavix   Monitor chest tube output   Vascular surgery consulted for fistula evaluation    ***Pulmonary***  Postoperative acute respiratory failure.  Continue vent support.    ***GI***  Advance tube feeds.  Protonix for stress ulcer prophylaxis.   Bowel regimen.  Transaminitis - will trend, avoid hepatotoxins.  Trend bilirubin.  Paracentesis 1/3 for suspected bacterial peritonitis - clear yellow fluid    ***Renal***  ESRD on CRRT - volume removal as tolerated.    ***ID***  Sepsis/septic shock due to ESBL E coli - Continue Merrem per ID recs.   Repeat blood cultures today.  Trend leukocytosis.    ***Endocrine***  Hyperglycemia- Insulin sliding scale    ***Hematology***  Acute blood loss anemia and thrombocytopenia.  No current transfusion indication.  Off Heparin    *** Lines ***  RIJ Intro - 12/30  R rad afia - 12/30  R fem HD cath - 1/3    Care plan discussed with the ICU care team.   Patient remains critical, at risk for life threatening decompensation.    I have spent 30 minutes providing critical care management to this patient.    By signing my name below, I, Angelo Barbosa, attest that this documentation has been prepared under the direction and in the presence of Blaze Finch MD.  Electronically signed: Angelo Barbosa, 01-06-25 @ 07:52    I, Blaze Finch MD,  personally performed the services described in this documentation. all medical record entries made by the scribe were at my direction and in my presence. I have reviewed the chart and agree that the record reflects my personal performance and is accurate and complete  Electronically signed: Blaze Finch MD. Patient seen and examined at the bedside.    Remains critically ill on continuous ICU monitoring.      Brief Summary:  53 yo M with multiple medical problems including CAD s/p PCI in April 2024 s/p CABG x 3 on 12/30/24 1/2: Intubated for hypoxia & left lung white out s/p awake bronch with removal of copious mucopurulent secretions  1/4: s/p IABP insertion, sepsis/septic shock due to E coli       24 Hour events:  continues on full vent support  CRRT hx of ESRD on HD   IABP weaning parameters while on inotropic support       Objective:  Vital Signs Last 24 Hrs  T(C): 37.6 (06 Jan 2025 08:00), Max: 37.6 (06 Jan 2025 04:00)  T(F): 99.7 (06 Jan 2025 08:00), Max: 99.7 (06 Jan 2025 04:00)  HR: 89 (06 Jan 2025 07:45) (74 - 129)  BP: --  BP(mean): --  RR: 18 (06 Jan 2025 07:45) (11 - 26)  SpO2: 98% (06 Jan 2025 07:45) (87% - 100%)    Parameters below as of 06 Jan 2025 08:00  Patient On (Oxygen Delivery Method): ventilator    O2 Concentration (%): 40    Mode: AC/ CMV (Assist Control/ Continuous Mandatory Ventilation),   RR (machine): 14  TV (machine): 450  FiO2: 40  PEEP: 8  ITime: 1  MAP: 13  PIP: 29              Physical Exam:   General: Intubated  Neurology: Sedated   Respiratory: Clear bilaterally   CV: Sinus  Abdominal: Soft, Nontender  Extremities: Warm, well-perfused  IABP site ok.  -------------------------------------------------------------------------------------------------------------------------------    Labs:                        8.7    23.31 )-----------( 70       ( 06 Jan 2025 00:35 )             27.3     01-06    133[L]  |  96  |  31[H]  ----------------------------<  168[H]  3.8   |  19[L]  |  2.45[H]    Ca    8.7      06 Jan 2025 00:35  Phos  2.7     01-06  Mg     2.5     01-06    TPro  6.4  /  Alb  3.2[L]  /  TBili  2.3[H]  /  DBili  x   /  AST  301[H]  /  ALT  565[H]  /  AlkPhos  122[H]  01-06    LIVER FUNCTIONS - ( 06 Jan 2025 00:35 )  Alb: 3.2 g/dL / Pro: 6.4 g/dL / ALK PHOS: 122 U/L / ALT: 565 U/L / AST: 301 U/L / GGT: x           PT/INR - ( 06 Jan 2025 00:35 )   PT: 17.2 sec;   INR: 1.50 ratio         PTT - ( 06 Jan 2025 00:35 )  PTT:34.3 sec  ABG - ( 06 Jan 2025 07:38 )  pH, Arterial: 7.39  pH, Blood: x     /  pCO2: 34    /  pO2: 207   / HCO3: 21    / Base Excess: -3.8  /  SaO2: 99.5      ------------------------------------------------------------------------------------------------------------------------------  Assessment:  53 yo M s/p CABG x 3 on 12/30/24    Postop acute respiratory failure  Cardiogenic and Septic shock  Acute blood loss anemia  Transaminitis  Thrombocytopenia   ESRD on HD  E coli bacteremia 2/2 pneumonia  Leukocytosis     Plan:    ***Neuro***  Sedated with Precedex - RASS 0-1  Postoperative acute pain control with prn Fentanyl.   Colchicine     ***Cardiovascular***  s/p CABG x 3  R. fem IABP 1:1  Wean Vasopressin and Levophed for MAP 65 mm Hg  Remains on Dobutamine  Amiodarone infusion for a fib - transition to PO   Nitric oxide at 20 ppm - wean as tolerated   ASA / Plavix   Monitor chest tube output - remove left chest tube   Vascular surgery consulted for fistula evaluation  1/5 TTE     1. Left ventricular systolic function is moderately decreased with an ejection fraction visually estimated at 40 %.   2. There is hypokinesis of the inferior and inferolateral walls.   3. Enlarged right ventricular cavity size and reduced right ventricular systolic function.    ***Pulmonary***  Postoperative acute respiratory failure  Daily SAT/SBT  and PS as tolerated.   Continue vent support.    ***GI***  Advance tube feeds.  Protonix for stress ulcer prophylaxis.   Bowel regimen.  Transaminitis - will trend, avoid hepatotoxins.  Trend bilirubin.  Paracentesis 1/3 for suspected bacterial peritonitis - negative for SBP     ***Renal***  ESRD on CRRT - volume removal as tolerated.    ***ID***  Sepsis/septic shock due to ESBL E coli - Continue Merrem per ID recs.   Repeat blood cultures 1/5  1/2 Bcx ESBL E coli   1/3 Bronch ESBL E Coli   Trend leukocytosis.  Surgical chest wall wound well appearing - no active infection     ***Endocrine***  Hyperglycemia- Insulin sliding scale    ***Hematology***  Acute blood loss anemia and thrombocytopenia.  No current transfusion indication.  Off Heparin    *** Lines ***  RIJ Intro - 12/30 - consider replacing today or tomorrow with trialysis   R rad afia - 12/30  R fem HD cath - 1/3    Care plan discussed with the ICU care team.   Patient remains critical, at risk for life threatening decompensation.    I have spent 60 minutes providing critical care management to this patient.    By signing my name below, I, Angelo Barbosa, attest that this documentation has been prepared under the direction and in the presence of Blaze Finch MD.  Electronically signed: Angelo Barbosa, 01-06-25 @ 07:52    I, Blaze Finch MD,  personally performed the services described in this documentation. all medical record entries made by the scribe were at my direction and in my presence. I have reviewed the chart and agree that the record reflects my personal performance and is accurate and complete  Electronically signed: Blaze Finch MD.

## 2025-01-06 NOTE — PROGRESS NOTE ADULT - SUBJECTIVE AND OBJECTIVE BOX
Patient is a 54y old  Male who presents with a chief complaint of CAD (06 Jan 2025 06:59)    Being followed by ID for        Interval history:  No other acute events      ROS:  No cough,SOB,CP  No N/V/D  No abd pain  No urinary complaints  No HA  No joint or limb pain  No other complaints    PAST MEDICAL & SURGICAL HISTORY:  Type 2 diabetes mellitus      Hypertension      End stage renal disease  started HD 2/2019 T, Th, Sat via right chest permacath      Bone spur  right shoulder- hx of - sx done      Anemia      Injury of right wrist  hx of at age 14      History of hyperkalemia  before HD- K-6.2 - to repeat in am of sx, pt had HD today      S/P arthroscopy of right shoulder  2010      History of vascular access device  right chest permacath - 2/2019      H/O right wrist surgery  tendons repair - at age 14      AV fistula      H/O ventral hernia repair      S/P cholecystectomy        Allergies    No Known Allergies    Intolerances      Antimicrobials:    meropenem  IVPB 1000 milliGRAM(s) IV Intermittent every 8 hours    MEDICATIONS  (STANDING):  albuterol/ipratropium for Nebulization 3 milliLiter(s) Nebulizer every 6 hours  aMIOdarone Infusion 0.5 mG/Min (16.7 mL/Hr) IV Continuous <Continuous>  aspirin  chewable 81 milliGRAM(s) Oral daily  bisacodyl Suppository 10 milliGRAM(s) Rectal once  calcium acetate 667 milliGRAM(s) Oral three times a day with meals  chlorhexidine 0.12% Liquid 15 milliLiter(s) Oral Mucosa every 12 hours  chlorhexidine 2% Cloths 1 Application(s) Topical daily  clopidogrel Tablet 75 milliGRAM(s) Oral daily  colchicine 0.3 milliGRAM(s) Oral daily  CRRT Treatment    <Continuous>  dexMEDEtomidine Infusion 0.5 MICROgram(s)/kG/Hr (10.6 mL/Hr) IV Continuous <Continuous>  dextrose 50% Injectable 50 milliLiter(s) IV Push every 15 minutes  dextrose 50% Injectable 25 milliLiter(s) IV Push every 15 minutes  DOBUTamine Infusion 5 MICROgram(s)/kG/Min (6.38 mL/Hr) IV Continuous <Continuous>  epoetin ignacia (EPOGEN) Injectable 31891 Unit(s) IV Push <User Schedule>  insulin lispro (ADMELOG) corrective regimen sliding scale   SubCutaneous every 6 hours  lidocaine   4% Patch 2 Patch Transdermal daily  meropenem  IVPB 1000 milliGRAM(s) IV Intermittent every 8 hours  Nephro-elicia 1 Tablet(s) Oral daily  norepinephrine Infusion 0.05 MICROgram(s)/kG/Min (7.98 mL/Hr) IV Continuous <Continuous>  pantoprazole  Injectable 40 milliGRAM(s) IV Push daily  Phoxillum Filtration BK 4 / 2.5 5000 milliLiter(s) (200 mL/Hr) CRRT <Continuous>  Phoxillum Filtration BK 4 / 2.5 5000 milliLiter(s) (800 mL/Hr) CRRT <Continuous>  polyethylene glycol 3350 17 Gram(s) Oral two times a day  PrismaSATE Dialysate BGK 4 / 2.5 5000 milliLiter(s) (1000 mL/Hr) CRRT <Continuous>  senna 2 Tablet(s) Oral at bedtime  sodium chloride 0.9%. 1000 milliLiter(s) (10 mL/Hr) IV Continuous <Continuous>  vasopressin Infusion 0.03 Unit(s)/Min (4.5 mL/Hr) IV Continuous <Continuous>      Vital Signs Last 24 Hrs  T(C): 37.4 (01-06-25 @ 12:00), Max: 37.6 (01-06-25 @ 04:00)  T(F): 99.3 (01-06-25 @ 12:00), Max: 99.7 (01-06-25 @ 04:00)  HR: 98 (01-06-25 @ 15:29) (72 - 123)  BP: --  BP(mean): --  RR: 13 (01-06-25 @ 15:15) (11 - 24)  SpO2: 98% (01-06-25 @ 15:29) (87% - 100%)    Physical Exam:    Constitutional well preserved,comfortable,pleasant    HEENT PERRLA EOMI,No pallor or icterus    No oral exudate or erythema    Neck supple no JVD or LN    Chest Good AE,CTA    CVS RRR S1 S2 WNl No murmur or rub or gallop    Abd soft BS normal No tenderness no masses    Ext No cyanosis clubbing or edema    IV site no erythema tenderness or discharge    Joints no swelling or LOM    CNS AAO X 3 no focal    Lab Data:                          8.5    21.81 )-----------( 52       ( 06 Jan 2025 13:30 )             26.2       01-06    133[L]  |  96  |  31[H]  ----------------------------<  168[H]  3.8   |  19[L]  |  2.45[H]    Ca    8.7      06 Jan 2025 00:35  Phos  2.7     01-06  Mg     2.5     01-06    TPro  6.4  /  Alb  3.2[L]  /  TBili  2.3[H]  /  DBili  x   /  AST  301[H]  /  ALT  565[H]  /  AlkPhos  122[H]  01-06      Urinalysis Basic - ( 06 Jan 2025 00:35 )    Color: x / Appearance: x / SG: x / pH: x  Gluc: 168 mg/dL / Ketone: x  / Bili: x / Urobili: x   Blood: x / Protein: x / Nitrite: x   Leuk Esterase: x / RBC: x / WBC x   Sq Epi: x / Non Sq Epi: x / Bacteria: x        Peritoneal  01-03-25   No growth  --    polymorphonuclear leukocytes seen  No organisms seen  by cytocentrifuge      Bronchial  01-03-25   Culture is being performed.  --  --      Bronchial  01-03-25   Numerous Escherichia coli ESBL  Commensal manjit consistent with body site  --  Escherichia coli ESBL      .Blood BLOOD  01-02-25   Growth in aerobic and anaerobic bottles: Escherichia coli ESBL  --  Escherichia coli ESBL      .Blood BLOOD  01-02-25   Growth in aerobic and anaerobic bottles: Escherichia coli ESBL  See previous culture [id number]  Direct identification is available within approximately 3-5  hours either by Blood Panel Multiplexed PCR or Direct  MALDI-TOF. Details: [email or web address]  --  Blood Culture PCR      .Sputum Sputum  01-02-25   Numerous Escherichia coli ESBL  Commensal manjit consistent with body site  --  Escherichia coli ESBL                Vancomycin Level, Trough: 12.4 ug/mL (01-05-25 @ 20:19)      WBC Count: 21.81 (01-06-25 @ 13:30)  WBC Count: 23.31 (01-06-25 @ 00:35)  WBC Count: 24.30 (01-05-25 @ 00:48)  WBC Count: 19.46 (01-04-25 @ 14:33)  WBC Count: 13.29 (01-04-25 @ 00:56)  WBC Count: 9.39 (01-03-25 @ 16:17)  WBC Count: 4.71 (01-03-25 @ 09:25)  WBC Count: 1.11 (01-03-25 @ 01:31)  WBC Count: 1.35 (01-03-25 @ 00:43)  WBC Count: 14.34 (01-02-25 @ 00:33)  WBC Count: 14.49 (01-01-25 @ 00:24)  WBC Count: 12.51 (12-31-24 @ 00:30)  WBC Count: 12.27 (12-30-24 @ 19:08)       Bilirubin Total: 2.3 mg/dL (01-06-25 @ 00:35)  Aspartate Aminotransferase (AST/SGOT): 301 U/L (01-06-25 @ 00:35)  Alanine Aminotransferase (ALT/SGPT): 565 U/L (01-06-25 @ 00:35)  Alkaline Phosphatase: 122 U/L (01-06-25 @ 00:35)  Bilirubin Total: 2.6 mg/dL (01-05-25 @ 00:47)  Aspartate Aminotransferase (AST/SGOT): 560 U/L (01-05-25 @ 00:47)  Alkaline Phosphatase: 108 U/L (01-05-25 @ 00:47)  Alanine Aminotransferase (ALT/SGPT): 787 U/L (01-05-25 @ 00:47)  Bilirubin Total: 2.5 mg/dL (01-04-25 @ 14:33)  Alkaline Phosphatase: 85 U/L (01-04-25 @ 14:33)  Aspartate Aminotransferase (AST/SGOT): 690 U/L (01-04-25 @ 14:33)  Alanine Aminotransferase (ALT/SGPT): 867 U/L (01-04-25 @ 14:33)  Bilirubin Total: 2.0 mg/dL (01-04-25 @ 00:56)  Alkaline Phosphatase: 73 U/L (01-04-25 @ 00:56)  Aspartate Aminotransferase (AST/SGOT): 1029 U/L (01-04-25 @ 00:56)  Alanine Aminotransferase (ALT/SGPT): 938 U/L (01-04-25 @ 00:56)  Alkaline Phosphatase: 68 U/L (01-03-25 @ 16:17)  Bilirubin Total: 1.7 mg/dL (01-03-25 @ 16:17)  Aspartate Aminotransferase (AST/SGOT): 1253 U/L (01-03-25 @ 16:17)  Alanine Aminotransferase (ALT/SGPT): 1002 U/L (01-03-25 @ 16:17)  Bilirubin Total: 1.5 mg/dL (01-03-25 @ 09:25)  Alkaline Phosphatase: 76 U/L (01-03-25 @ 09:25)  Aspartate Aminotransferase (AST/SGOT): 1170 U/L (01-03-25 @ 09:25)  Alanine Aminotransferase (ALT/SGPT): 927 U/L (01-03-25 @ 09:25)  Bilirubin Total: 0.8 mg/dL (01-03-25 @ 00:43)  Aspartate Aminotransferase (AST/SGOT): 588 U/L (01-03-25 @ 00:43)  Alanine Aminotransferase (ALT/SGPT): 588 U/L (01-03-25 @ 00:43)  Alkaline Phosphatase: 90 U/L (01-03-25 @ 00:43)  Bilirubin Total: 0.6 mg/dL (01-02-25 @ 00:32)  Alkaline Phosphatase: 90 U/L (01-02-25 @ 00:32)  Aspartate Aminotransferase (AST/SGOT): 901 U/L (01-02-25 @ 00:32)  Alanine Aminotransferase (ALT/SGPT): 632 U/L (01-02-25 @ 00:32)  Aspartate Aminotransferase (AST/SGOT): 719 U/L (01-01-25 @ 18:35)  Bilirubin Total: 0.9 mg/dL (01-01-25 @ 18:35)  Alanine Aminotransferase (ALT/SGPT): 562 U/L (01-01-25 @ 18:35)  Alkaline Phosphatase: 86 U/L (01-01-25 @ 18:35)         Patient is a 54y old  Male who presents with a chief complaint of CAD (06 Jan 2025 06:59)    Being followed by ID for bacteremia        Interval history:  IABP removed  pt remains intubated   secretions less , weaning trials starting  No other acute events        PAST MEDICAL & SURGICAL HISTORY:  Type 2 diabetes mellitus      Hypertension      End stage renal disease  started HD 2/2019 T, Th, Sat via right chest permacath      Bone spur  right shoulder- hx of - sx done      Anemia      Injury of right wrist  hx of at age 14      History of hyperkalemia  before HD- K-6.2 - to repeat in am of sx, pt had HD today      S/P arthroscopy of right shoulder  2010      History of vascular access device  right chest permacath - 2/2019      H/O right wrist surgery  tendons repair - at age 14      AV fistula      H/O ventral hernia repair      S/P cholecystectomy        Allergies    No Known Allergies    Intolerances      Antimicrobials:    meropenem  IVPB 1000 milliGRAM(s) IV Intermittent every 8 hours    MEDICATIONS  (STANDING):  albuterol/ipratropium for Nebulization 3 milliLiter(s) Nebulizer every 6 hours  aMIOdarone Infusion 0.5 mG/Min (16.7 mL/Hr) IV Continuous <Continuous>  aspirin  chewable 81 milliGRAM(s) Oral daily  bisacodyl Suppository 10 milliGRAM(s) Rectal once  calcium acetate 667 milliGRAM(s) Oral three times a day with meals  chlorhexidine 0.12% Liquid 15 milliLiter(s) Oral Mucosa every 12 hours  chlorhexidine 2% Cloths 1 Application(s) Topical daily  clopidogrel Tablet 75 milliGRAM(s) Oral daily  colchicine 0.3 milliGRAM(s) Oral daily  CRRT Treatment    <Continuous>  dexMEDEtomidine Infusion 0.5 MICROgram(s)/kG/Hr (10.6 mL/Hr) IV Continuous <Continuous>  dextrose 50% Injectable 50 milliLiter(s) IV Push every 15 minutes  dextrose 50% Injectable 25 milliLiter(s) IV Push every 15 minutes  DOBUTamine Infusion 5 MICROgram(s)/kG/Min (6.38 mL/Hr) IV Continuous <Continuous>  epoetin ignacia (EPOGEN) Injectable 31378 Unit(s) IV Push <User Schedule>  insulin lispro (ADMELOG) corrective regimen sliding scale   SubCutaneous every 6 hours  lidocaine   4% Patch 2 Patch Transdermal daily  meropenem  IVPB 1000 milliGRAM(s) IV Intermittent every 8 hours  Nephro-elicia 1 Tablet(s) Oral daily  norepinephrine Infusion 0.05 MICROgram(s)/kG/Min (7.98 mL/Hr) IV Continuous <Continuous>  pantoprazole  Injectable 40 milliGRAM(s) IV Push daily  Phoxillum Filtration BK 4 / 2.5 5000 milliLiter(s) (200 mL/Hr) CRRT <Continuous>  Phoxillum Filtration BK 4 / 2.5 5000 milliLiter(s) (800 mL/Hr) CRRT <Continuous>  polyethylene glycol 3350 17 Gram(s) Oral two times a day  PrismaSATE Dialysate BGK 4 / 2.5 5000 milliLiter(s) (1000 mL/Hr) CRRT <Continuous>  senna 2 Tablet(s) Oral at bedtime  sodium chloride 0.9%. 1000 milliLiter(s) (10 mL/Hr) IV Continuous <Continuous>  vasopressin Infusion 0.03 Unit(s)/Min (4.5 mL/Hr) IV Continuous <Continuous>      Vital Signs Last 24 Hrs  T(C): 37.4 (01-06-25 @ 12:00), Max: 37.6 (01-06-25 @ 04:00)  T(F): 99.3 (01-06-25 @ 12:00), Max: 99.7 (01-06-25 @ 04:00)  HR: 98 (01-06-25 @ 15:29) (72 - 123)  BP: --  BP(mean): --  RR: 13 (01-06-25 @ 15:15) (11 - 24)  SpO2: 98% (01-06-25 @ 15:29) (87% - 100%)    Physical Exam:    Constitutional intubated    HEENT PERRLA EOMI,No pallor or icterus    No oral exudate or erythema    Neck supple no JVD or LN    Chest Good AE,CTA    CVS  S1 S2     Abd softly distended    Ext No cyanosis clubbing or edema    IV site no erythema tenderness or discharge        Lab Data:                          8.5    21.81 )-----------( 52       ( 06 Jan 2025 13:30 )             26.2       01-06    133[L]  |  96  |  31[H]  ----------------------------<  168[H]  3.8   |  19[L]  |  2.45[H]    Ca    8.7      06 Jan 2025 00:35  Phos  2.7     01-06  Mg     2.5     01-06    TPro  6.4  /  Alb  3.2[L]  /  TBili  2.3[H]  /  DBili  x   /  AST  301[H]  /  ALT  565[H]  /  AlkPhos  122[H]  01-06      Urinalysis (01.03.25 @ 02:59)    pH Urine: 6.5   Glucose Qualitative, Urine: Negative mg/dL   Blood, Urine: Large   Color: Dark Yellow   Urine Appearance: Turbid   Bilirubin: Negative   Ketone - Urine: Trace mg/dL   Specific Gravity: 1.019   Protein, Urine: 300 mg/dL   Urobilinogen: 0.2 mg/dL   Nitrite: Negative   Leukocyte Esterase Concentration: Moderate  Urine Microscopic-Add On (NC) (01.03.25 @ 02:59)    White Blood Cell - Urine: 238 /HPF   Sperm-like Cells: Present   Red Blood Cell - Urine: 294 /HPF   Epithelial Cells: 17 /HPF   Bacteria: Few /HPF   Review: Reviewed   Cast: 31 /LPF    Culture - Bronchial (01.03.25 @ 05:51)    -  Trimethoprim/Sulfamethoxazole: R >2/38   -  Piperacillin/Tazobactam: R <=8   -  Tobramycin: S <=2   -  Gentamicin: S <=2   -  Imipenem: S <=1   -  Levofloxacin: R >4   -  Meropenem: S <=1   -  Ampicillin: R >16 These ampicillin results predict results for amoxicillin   -  Ampicillin/Sulbactam: R 8/4   -  Aztreonam: R >16   -  Cefazolin: R >16   -  Cefepime: R >16   -  Ceftriaxone: R >32   -  Ciprofloxacin: R >2   -  Ertapenem: S <=0.5   Gram Stain:   Numerous polymorphonuclear leukocytes seen per low power field  Numerous Gram Negative Rods seen per oil power field   Specimen Source: Bronchial   Culture Results:   Numerous Escherichia coli ESBL  Commensal manjit consistent with body site   Organism Identification: Escherichia coli ESBL   Organism: Escherichia coli ESBL   Method Type: LISA              Peritoneal  01-03-25   No growth  --    polymorphonuclear leukocytes seen  No organisms seen  by cytocentrifuge      Bronchial  01-03-25   Culture is being performed.  --  --      Bronchial  01-03-25   Numerous Escherichia coli ESBL  Commensal manjit consistent with body site  --  Escherichia coli ESBL      .Blood BLOOD  01-02-25   Growth in aerobic and anaerobic bottles: Escherichia coli ESBL  --  Escherichia coli ESBL      .Blood BLOOD  01-02-25   Growth in aerobic and anaerobic bottles: Escherichia coli ESBL  See previous culture [id number]  Direct identification is available within approximately 3-5  hours either by Blood Panel Multiplexed PCR or Direct  MALDI-TOF. Details: [email or web address]  --  Blood Culture PCR      .Sputum Sputum  01-02-25   Numerous Escherichia coli ESBL  Commensal manjit consistent with body site  --  Escherichia coli ESBL      Culture - Blood (01.02.25 @ 20:45)    -  Ampicillin: R >16 These ampicillin results predict results for amoxicillin   -  Ertapenem: S <=0.5   -  Gentamicin: S <=2   -  Ceftriaxone: R >32   -  Ciprofloxacin: R >2   -  Cefazolin: R >16   -  Cefepime: R >16   -  Ampicillin/Sulbactam: R 16/8   -  Aztreonam: R >16   -  Meropenem: S <=1   -  Piperacillin/Tazobactam: R <=8   -  Imipenem: S <=1   -  Levofloxacin: R >4   -  Tobramycin: S <=2   -  Trimethoprim/Sulfamethoxazole: R >2/38   Gram Stain:   Growth in aerobic bottle: Gram Negative Rods  Growth in anaerobic bottle: Gram Negative Rods   Specimen Source: .Blood BLOOD   Organism: Escherichia coli ESBL   Culture Results:   Growth in aerobic and anaerobic bottles: Escherichia coli ESBL   Organism Identification: Escherichia coli ESBL   Method Type: LISA      Vancomycin Level, Trough: 12.4 ug/mL (01-05-25 @ 20:19)      WBC Count: 21.81 (01-06-25 @ 13:30)  WBC Count: 23.31 (01-06-25 @ 00:35)  WBC Count: 24.30 (01-05-25 @ 00:48)  WBC Count: 19.46 (01-04-25 @ 14:33)  WBC Count: 13.29 (01-04-25 @ 00:56)  WBC Count: 9.39 (01-03-25 @ 16:17)  WBC Count: 4.71 (01-03-25 @ 09:25)  WBC Count: 1.11 (01-03-25 @ 01:31)  WBC Count: 1.35 (01-03-25 @ 00:43)  WBC Count: 14.34 (01-02-25 @ 00:33)  WBC Count: 14.49 (01-01-25 @ 00:24)  WBC Count: 12.51 (12-31-24 @ 00:30)  WBC Count: 12.27 (12-30-24 @ 19:08)       Bilirubin Total: 2.3 mg/dL (01-06-25 @ 00:35)  Aspartate Aminotransferase (AST/SGOT): 301 U/L (01-06-25 @ 00:35)  Alanine Aminotransferase (ALT/SGPT): 565 U/L (01-06-25 @ 00:35)  Alkaline Phosphatase: 122 U/L (01-06-25 @ 00:35)    Bilirubin Total: 2.6 mg/dL (01-05-25 @ 00:47)  Aspartate Aminotransferase (AST/SGOT): 560 U/L (01-05-25 @ 00:47)  Alkaline Phosphatase: 108 U/L (01-05-25 @ 00:47)  Alanine Aminotransferase (ALT/SGPT): 787 U/L (01-05-25 @ 00:47)      Cell Count, Body Fluid (01.03.25 @ 15:34)    Tube Type: Lavender   Fluid Type: Peritoneal Fluid   Body Fluid Appearance: Clear   Color - Body Fluid: Yellow   Total Nucleated Cell Count, Body Fluid: 96: Reference Ranges:  Synovial Fluid  Total nucleated cells < 150 cells/uL  Neutrophils <25%  Lymphocytes 10-15%  Monocytes/Macrophages </=70%  Other parameters- No established reference ranges.  Bronchoalveolar lavage  Macrophages >85%  Lymphocytes 10-15%  Neutrophils <3%  Eosinophils <1%  Other parameters- No established reference ranges.  Peritoneal Fluid  WBC Count </=499 cells/uL  RBC Count </=999 cells/uL  Neutrophil % </=24%  Neutrophil # >/= 250 cells/uL critical  Other parameters- No established reference ranges.  Pleural/pericardial fluid/other body fluid types  No established reference ranges. cells/uL   WBC Count.: 88: Differentials performed on hematopoietic cells only. cells/uL   Total RBC Count: <2000 cells/uL   Neutrophils-Body Fluid: 45 %   BF Lymphocytes: 30 %   Monocyte/Macrophage Count - Body Fluid: 24 %   Eosinophil Count - Body Fluid: 1 %   Total Cells Counted, Body Fluid: 96 Cells      < from: [facility] Duplex Hemodialysis Access, Left (01.06.25 @ 13:35) >    There is an apparent stenosis of the egress cephalic vein with velocities   at the stenosis measuring 542/269 cm/s compared with velocities upstream    to the stenosis measuring 149/72 cm/s    However, the diameter at the apparent stenosis measured 0.58 cm    And the arterial flow volume of 1301 mL/m, and the venous flow volume of   1492 mL/m are consistent with a normally functioning dialysis access   fistula.    Retrograde flow in the left radial artery distal to the surgical   anastomosis indicates steal phenomenon.    < end of copied text >    < from: US Abdomen Complete (US Abdomen Complete .) (01.03.25 @ 07:23) >  IMPRESSION:  Mild/moderate free fluid largest pocket noted in the right lower quadrant.  Enlarged liver.  Small echogenic right kidney.  Right pleural effusion.  Left kidney not well-visualized.    < end of copied text >    < from: Xray Chest 1 View- PORTABLE-Urgent (Xray Chest 1 View- PORTABLE-Urgent .) (01.06.25 @ 10:18) >    IMPRESSION:  Interval removal of left-sided chest tube.  IABP 4.5 cm from the top the aortic arch.  No pneumothorax.  Other lines and tubes unchanged.    < end of copied text >

## 2025-01-06 NOTE — ADVANCED PRACTICE NURSE CONSULT - RECOMMEDATIONS
Impression       Sacrum / B/L Buttocks- deep tissue injur           Recommendations          1. Bilateral sacrum/buttock - non-pressure related wound        Topical therapy- sacral/bilateral buttocks- Apply Triad 2 X Daily and PRN Soiling        2. Right and Left heel          Elevate heels; apply Complete Cair air fluidized boots; ensure that the soles of the feet are not resting on the foot board of the bed.      3. Incontinent management - incontinent cleanser, pads, lexii care BID            4. Maintain on an alternating air with low air loss surface             5. Turn & reposition every 2 hr; Use positioning pillow to turn and reposition, soft pillow between bony prominences; continue measures to decrease friction/shear/pressure.            6. Nutrition optimization.            7. Place waffle cushion when out of bed to chair       Impression       Sacrum / B/L Buttocks           Recommendations          1. Bilateral sacrum/buttock - non-pressure related wound        Topical therapy- sacral/bilateral buttocks- Apply Triad 2 X Daily and PRN Soiling        2. Right and Left heel          Elevate heels; apply Complete Cair air fluidized boots; ensure that the soles of the feet are not resting on the foot board of the bed.      3. Incontinent management - incontinent cleanser, pads, lexii care BID            4. Maintain on an alternating air with low air loss surface             5. Turn & reposition every 2 hr; Use positioning pillow to turn and reposition, soft pillow between bony prominences; continue measures to decrease friction/shear/pressure.            6. Nutrition optimization.            7. Place waffle cushion when out of bed to chair

## 2025-01-07 LAB
ALBUMIN SERPL ELPH-MCNC: 3 G/DL — LOW (ref 3.3–5)
ALP SERPL-CCNC: 102 U/L — SIGNIFICANT CHANGE UP (ref 40–120)
ALT FLD-CCNC: 377 U/L — HIGH (ref 10–45)
ANION GAP SERPL CALC-SCNC: 16 MMOL/L — SIGNIFICANT CHANGE UP (ref 5–17)
ANISOCYTOSIS BLD QL: SIGNIFICANT CHANGE UP
APTT BLD: 23.7 SEC — LOW (ref 24.5–35.6)
AST SERPL-CCNC: 180 U/L — HIGH (ref 10–40)
BASE EXCESS BLDV CALC-SCNC: -0.2 MMOL/L — SIGNIFICANT CHANGE UP (ref -2–3)
BASE EXCESS BLDV CALC-SCNC: -2.8 MMOL/L — LOW (ref -2–3)
BASE EXCESS BLDV CALC-SCNC: 1 MMOL/L — SIGNIFICANT CHANGE UP (ref -2–3)
BASOPHILS # BLD AUTO: 0 K/UL — SIGNIFICANT CHANGE UP (ref 0–0.2)
BASOPHILS NFR BLD AUTO: 0 % — SIGNIFICANT CHANGE UP (ref 0–2)
BUN SERPL-MCNC: 31 MG/DL — HIGH (ref 7–23)
CALCIUM SERPL-MCNC: 8.2 MG/DL — LOW (ref 8.4–10.5)
CHLORIDE SERPL-SCNC: 99 MMOL/L — SIGNIFICANT CHANGE UP (ref 96–108)
CO2 BLDV-SCNC: 23 MMOL/L — SIGNIFICANT CHANGE UP (ref 22–26)
CO2 BLDV-SCNC: 26 MMOL/L — SIGNIFICANT CHANGE UP (ref 22–26)
CO2 SERPL-SCNC: 19 MMOL/L — LOW (ref 22–31)
CREAT SERPL-MCNC: 2.17 MG/DL — HIGH (ref 0.5–1.3)
EGFR: 35 ML/MIN/1.73M2 — LOW
EGFR: 35 ML/MIN/1.73M2 — LOW
ELLIPTOCYTES BLD QL SMEAR: SLIGHT — SIGNIFICANT CHANGE UP
EOSINOPHIL # BLD AUTO: 0 K/UL — SIGNIFICANT CHANGE UP (ref 0–0.5)
EOSINOPHIL NFR BLD AUTO: 0 % — SIGNIFICANT CHANGE UP (ref 0–6)
FIBRINOGEN PPP-MCNC: 396 MG/DL — SIGNIFICANT CHANGE UP (ref 200–445)
GAS PNL BLDA: SIGNIFICANT CHANGE UP
GAS PNL BLDV: SIGNIFICANT CHANGE UP
GIANT PLATELETS BLD QL SMEAR: PRESENT — SIGNIFICANT CHANGE UP
GLUCOSE SERPL-MCNC: 172 MG/DL — HIGH (ref 70–99)
HCO3 BLDV-SCNC: 22 MMOL/L — SIGNIFICANT CHANGE UP (ref 22–29)
HCO3 BLDV-SCNC: 25 MMOL/L — SIGNIFICANT CHANGE UP (ref 22–29)
HCO3 BLDV-SCNC: 25 MMOL/L — SIGNIFICANT CHANGE UP (ref 22–29)
HGB BLD-MCNC: 8.3 G/DL — LOW (ref 13–17)
HOROWITZ INDEX BLDV+IHG-RTO: 40 — SIGNIFICANT CHANGE UP
INR BLD: 1.48 RATIO — HIGH (ref 0.85–1.16)
LYMPHOCYTES # BLD AUTO: 0.36 K/UL — LOW (ref 1–3.3)
LYMPHOCYTES # BLD AUTO: 1.7 % — LOW (ref 13–44)
MACROCYTES BLD QL: SIGNIFICANT CHANGE UP
MAGNESIUM SERPL-MCNC: 2.5 MG/DL — SIGNIFICANT CHANGE UP (ref 1.6–2.6)
MANUAL SMEAR VERIFICATION: SIGNIFICANT CHANGE UP
MCHC RBC-ENTMCNC: 30.1 PG — SIGNIFICANT CHANGE UP (ref 27–34)
MCHC RBC-ENTMCNC: 31.4 G/DL — LOW (ref 32–36)
MCV RBC AUTO: 95.7 FL — SIGNIFICANT CHANGE UP (ref 80–100)
MONOCYTES # BLD AUTO: 1.09 K/UL — HIGH (ref 0–0.9)
MYELOCYTES NFR BLD: 0.9 % — HIGH (ref 0–0)
NEUTROPHILS # BLD AUTO: 19.23 K/UL — HIGH (ref 1.8–7.4)
NEUTROPHILS NFR BLD AUTO: 91.4 % — HIGH (ref 43–77)
NRBC # BLD: 1 /100 WBCS — HIGH (ref 0–0)
NRBC BLD-RTO: 1 /100 WBCS — HIGH (ref 0–0)
OVALOCYTES BLD QL SMEAR: SLIGHT — SIGNIFICANT CHANGE UP
PCO2 BLDV: 37 MMHG — LOW (ref 42–55)
PCO2 BLDV: 38 MMHG — LOW (ref 42–55)
PCO2 BLDV: 41 MMHG — LOW (ref 42–55)
PH BLDV: 7.38 — SIGNIFICANT CHANGE UP (ref 7.32–7.43)
PH BLDV: 7.39 — SIGNIFICANT CHANGE UP (ref 7.32–7.43)
PH BLDV: 7.43 — SIGNIFICANT CHANGE UP (ref 7.32–7.43)
PLAT MORPH BLD: NORMAL — SIGNIFICANT CHANGE UP
PLATELET # BLD AUTO: 58 K/UL — LOW (ref 150–400)
PO2 BLDV: 45 MMHG — SIGNIFICANT CHANGE UP (ref 25–45)
PO2 BLDV: 49 MMHG — HIGH (ref 25–45)
PO2 BLDV: 49 MMHG — HIGH (ref 25–45)
POIKILOCYTOSIS BLD QL AUTO: SLIGHT — SIGNIFICANT CHANGE UP
POTASSIUM SERPL-MCNC: 3.6 MMOL/L — SIGNIFICANT CHANGE UP (ref 3.5–5.3)
POTASSIUM SERPL-SCNC: 3.6 MMOL/L — SIGNIFICANT CHANGE UP (ref 3.5–5.3)
PROT SERPL-MCNC: 6.1 G/DL — SIGNIFICANT CHANGE UP (ref 6–8.3)
PROTHROM AB SERPL-ACNC: 16.8 SEC — HIGH (ref 9.9–13.4)
RBC # BLD: 2.76 M/UL — LOW (ref 4.2–5.8)
RBC # FLD: 18.4 % — HIGH (ref 10.3–14.5)
RBC BLD AUTO: ABNORMAL
SAO2 % BLDV: 79.9 % — SIGNIFICANT CHANGE UP (ref 67–88)
SAO2 % BLDV: 83.6 % — SIGNIFICANT CHANGE UP (ref 67–88)
SAO2 % BLDV: 89.1 % — HIGH (ref 67–88)
SODIUM SERPL-SCNC: 134 MMOL/L — LOW (ref 135–145)
TARGETS BLD QL SMEAR: SLIGHT — SIGNIFICANT CHANGE UP
VARIANT LYMPHS # BLD: 0.8 % — SIGNIFICANT CHANGE UP (ref 0–6)
VARIANT LYMPHS NFR BLD MANUAL: 0.8 % — SIGNIFICANT CHANGE UP (ref 0–6)
WBC # BLD: 21.04 K/UL — HIGH (ref 3.8–10.5)
WBC # FLD AUTO: 21.04 K/UL — HIGH (ref 3.8–10.5)

## 2025-01-07 PROCEDURE — 71045 X-RAY EXAM CHEST 1 VIEW: CPT | Mod: 26,76

## 2025-01-07 PROCEDURE — 99291 CRITICAL CARE FIRST HOUR: CPT | Mod: FS,25

## 2025-01-07 PROCEDURE — 36556 INSERT NON-TUNNEL CV CATH: CPT | Mod: LT

## 2025-01-07 PROCEDURE — 99233 SBSQ HOSP IP/OBS HIGH 50: CPT

## 2025-01-07 PROCEDURE — G0545: CPT

## 2025-01-07 RX ORDER — ACETAMINOPHEN 500 MG/5ML
1000 LIQUID (ML) ORAL ONCE
Refills: 0 | Status: COMPLETED | OUTPATIENT
Start: 2025-01-07 | End: 2025-01-07

## 2025-01-07 RX ORDER — PROPOFOL 10 MG/ML
20 INJECTION, EMULSION INTRAVENOUS ONCE
Refills: 0 | Status: DISCONTINUED | OUTPATIENT
Start: 2025-01-07 | End: 2025-01-07

## 2025-01-07 RX ORDER — SODIUM PHOSPHATE,DIBASIC DIHYD
15 POWDER (GRAM) MISCELLANEOUS ONCE
Refills: 0 | Status: COMPLETED | OUTPATIENT
Start: 2025-01-07 | End: 2025-01-07

## 2025-01-07 RX ORDER — PROPOFOL 10 MG/ML
3 INJECTION, EMULSION INTRAVENOUS ONCE
Refills: 0 | Status: COMPLETED | OUTPATIENT
Start: 2025-01-07 | End: 2025-01-07

## 2025-01-07 RX ADMIN — VASOPRESSIN 4.5 UNIT(S)/MIN: 20 INJECTION INTRAVENOUS at 07:54

## 2025-01-07 RX ADMIN — Medication 667 MILLIGRAM(S): at 07:54

## 2025-01-07 RX ADMIN — AMIODARONE HYDROCHLORIDE 16.7 MG/MIN: 50 INJECTION, SOLUTION INTRAVENOUS at 07:55

## 2025-01-07 RX ADMIN — POLYETHYLENE GLYCOL 3350 17 GRAM(S): 17 POWDER, FOR SOLUTION ORAL at 05:17

## 2025-01-07 RX ADMIN — COLCHICINE 0.3 MILLIGRAM(S): 0.6 TABLET, FILM COATED ORAL at 11:18

## 2025-01-07 RX ADMIN — IPRATROPIUM BROMIDE AND ALBUTEROL SULFATE 3 MILLILITER(S): .5; 2.5 SOLUTION RESPIRATORY (INHALATION) at 05:12

## 2025-01-07 RX ADMIN — Medication 25 MICROGRAM(S): at 12:13

## 2025-01-07 RX ADMIN — IPRATROPIUM BROMIDE AND ALBUTEROL SULFATE 3 MILLILITER(S): .5; 2.5 SOLUTION RESPIRATORY (INHALATION) at 17:54

## 2025-01-07 RX ADMIN — Medication 81 MILLIGRAM(S): at 11:18

## 2025-01-07 RX ADMIN — DOBUTAMINE 6.38 MICROGRAM(S)/KG/MIN: 250 INJECTION INTRAVENOUS at 07:55

## 2025-01-07 RX ADMIN — MEROPENEM 100 MILLIGRAM(S): 1 INJECTION INTRAVENOUS at 21:31

## 2025-01-07 RX ADMIN — DEXMEDETOMIDINE HYDROCHLORIDE IN SODIUM CHLORIDE 10.6 MICROGRAM(S)/KG/HR: 4 INJECTION INTRAVENOUS at 03:24

## 2025-01-07 RX ADMIN — Medication 400 MILLIGRAM(S): at 10:05

## 2025-01-07 RX ADMIN — Medication 1 TABLET(S): at 11:56

## 2025-01-07 RX ADMIN — Medication 1 APPLICATION(S): at 22:51

## 2025-01-07 RX ADMIN — Medication 2 TABLET(S): at 21:32

## 2025-01-07 RX ADMIN — Medication 50 MILLIEQUIVALENT(S): at 18:42

## 2025-01-07 RX ADMIN — Medication 25 MICROGRAM(S): at 03:10

## 2025-01-07 RX ADMIN — Medication 25 MICROGRAM(S): at 12:30

## 2025-01-07 RX ADMIN — Medication 10 MILLILITER(S): at 03:24

## 2025-01-07 RX ADMIN — Medication 63.75 MILLIMOLE(S): at 03:23

## 2025-01-07 RX ADMIN — Medication 25 MICROGRAM(S): at 02:40

## 2025-01-07 RX ADMIN — VASOPRESSIN 4.5 UNIT(S)/MIN: 20 INJECTION INTRAVENOUS at 03:24

## 2025-01-07 RX ADMIN — PROPOFOL 3 MILLIGRAM(S): 10 INJECTION, EMULSION INTRAVENOUS at 12:30

## 2025-01-07 RX ADMIN — Medication 40 MILLIGRAM(S): at 11:19

## 2025-01-07 RX ADMIN — MEROPENEM 100 MILLIGRAM(S): 1 INJECTION INTRAVENOUS at 12:13

## 2025-01-07 RX ADMIN — NOREPINEPHRINE BITARTRATE 7.98 MICROGRAM(S)/KG/MIN: 8 SOLUTION at 07:54

## 2025-01-07 RX ADMIN — IPRATROPIUM BROMIDE AND ALBUTEROL SULFATE 3 MILLILITER(S): .5; 2.5 SOLUTION RESPIRATORY (INHALATION) at 12:31

## 2025-01-07 RX ADMIN — IPRATROPIUM BROMIDE AND ALBUTEROL SULFATE 3 MILLILITER(S): .5; 2.5 SOLUTION RESPIRATORY (INHALATION) at 23:03

## 2025-01-07 RX ADMIN — DOBUTAMINE 6.38 MICROGRAM(S)/KG/MIN: 250 INJECTION INTRAVENOUS at 03:24

## 2025-01-07 RX ADMIN — Medication 15 MILLILITER(S): at 18:27

## 2025-01-07 RX ADMIN — Medication 1000 MILLIGRAM(S): at 10:50

## 2025-01-07 RX ADMIN — MEROPENEM 100 MILLIGRAM(S): 1 INJECTION INTRAVENOUS at 05:16

## 2025-01-07 RX ADMIN — Medication 15 MILLILITER(S): at 05:16

## 2025-01-07 RX ADMIN — INSULIN LISPRO 2: 100 INJECTION, SOLUTION INTRAVENOUS; SUBCUTANEOUS at 06:17

## 2025-01-07 NOTE — PROGRESS NOTE ADULT - SUBJECTIVE AND OBJECTIVE BOX
MR#[id number]  PATIENT NAME:RAAD CANO    DATE OF SERVICE: 25 @ 06:45  Patient was seen and examined by Solo Quick MD on    25 @ 06:45 .  Interim events noted.Consultant notes ,Labs,Telemetry reviewed by me       HOSPITAL COURSE: HPI:  54M with PMH of HTN, HLD, ESRD on HD MWF via LUE AVF, ascites (requiring repeat paracenteses q4-6wks), NICM with moderate LV dysfunction w/ EF 35-40%() and CAD s/p atherectomy + SALLIE to D1(2024) presents to [facility] transferred from [facility]. Patient initially presented to [facility] ED with shortness of breath. Of note he was recently admitted from - for acute cholecystitis s/p cholecystectomy. In [facility] ED, pt was hypoxic to 86% on RA, trop 1.7K, EKG no new changes, CXR fluid overload. Admitted for AHRF 2/2 fluid overload. Pt received HD on , ,  and . DAPT was resumed, TTE showed improvement in LVEF to 40-45%. Stress test was abnormal with 1mm inferior STD, reversible ischemia in the inferior wall and fixed defects in the lateral, anterior and apical walls with post stress EF of 24%. Pt was then transferred to [facility] for cardiac cath which showed pLAD 70% ISR, mLCx 70%, D1 severe dz. Patient now transferred to [facility] CTS Dr. Rivers for CABG eval. Patient denies any current SOB or chest pain on admission. Otherwise denies headaches, abdominal pain, urinary or bowel changes, fevers, chills, N/V/D, sick contacts.  (24 Dec 2024 05:07)      INTERIM EVENTS:Patient seen at bedside ,interim events noted.   Cardiac cath  pLAD 70% ISR, mLCx 70%, D1 severe dz.   -POD#1-C3L free LIMA-LAD; SVG-Diag; SVG-leftPL Awake OOB extubated Sinus rhythm on Dobutamine gtt  - Was intubated last night for airway protection s/p bronchoscopy This morning had IABP inserted  remains sedated intubated Sinus Rhythm  - s/p IABP insertion, sepsis/septic shock due to GNR/ECOLI -Paracentesis for suspected bacterial peritonitis  Transfused 1u pbrcs overnight  Remains on inoptropes and pressors with IABP at 1:1  -Intunated on IABP 1:1,Alex@20ppm,on ,weaning pressors  -POD#7-Remains intubated on CRRT-IABP 1:3,off pressors on Dobutrex in Atrial Fibrillation  -Intibated on Alex 10ppm,IABP dce'd,off Levophed,remains on Dobutrex-Atrial Fibrillation        MEDICATIONS  (STANDING):  albuterol/ipratropium for Nebulization 3 milliLiter(s) Nebulizer every 6 hours  aMIOdarone Infusion 0.5 mG/Min (16.7 mL/Hr) IV Continuous <Continuous>  aspirin  chewable 81 milliGRAM(s) Oral daily  bisacodyl Suppository 10 milliGRAM(s) Rectal once  calcium acetate 667 milliGRAM(s) Oral three times a day with meals  chlorhexidine 0.12% Liquid 15 milliLiter(s) Oral Mucosa every 12 hours  chlorhexidine 2% Cloths 1 Application(s) Topical daily  colchicine 0.3 milliGRAM(s) Oral daily  CRRT Treatment    <Continuous>  dexMEDEtomidine Infusion 0.5 MICROgram(s)/kG/Hr (10.6 mL/Hr) IV Continuous <Continuous>  dextrose 50% Injectable 50 milliLiter(s) IV Push every 15 minutes  dextrose 50% Injectable 25 milliLiter(s) IV Push every 15 minutes  DOBUTamine Infusion 5 MICROgram(s)/kG/Min (6.38 mL/Hr) IV Continuous <Continuous>  epoetin ignacia (EPOGEN) Injectable 48249 Unit(s) IV Push <User Schedule>  insulin lispro (ADMELOG) corrective regimen sliding scale   SubCutaneous every 6 hours  lidocaine   4% Patch 2 Patch Transdermal daily  meropenem  IVPB 1000 milliGRAM(s) IV Intermittent every 8 hours  Nephro-elicia 1 Tablet(s) Oral daily  norepinephrine Infusion 0.05 MICROgram(s)/kG/Min (7.98 mL/Hr) IV Continuous <Continuous>  pantoprazole  Injectable 40 milliGRAM(s) IV Push daily  Phoxillum Filtration BK 4 / 2.5 5000 milliLiter(s) (200 mL/Hr) CRRT <Continuous>  Phoxillum Filtration BK 4 / 2.5 5000 milliLiter(s) (800 mL/Hr) CRRT <Continuous>  polyethylene glycol 3350 17 Gram(s) Oral two times a day  PrismaSATE Dialysate BGK 4 / 2.5 5000 milliLiter(s) (1000 mL/Hr) CRRT <Continuous>  senna 2 Tablet(s) Oral at bedtime  sodium chloride 0.9%. 1000 milliLiter(s) (10 mL/Hr) IV Continuous <Continuous>  vasopressin Infusion 0.03 Unit(s)/Min (4.5 mL/Hr) IV Continuous <Continuous>    MEDICATIONS  (PRN):  fentaNYL    Injectable 25 MICROGram(s) IV Push every 3 hours PRN Severe Pain (7 - 10)  fentaNYL    Injectable 12.5 MICROGram(s) IV Push every 2 hours PRN Moderate Pain (4 - 6)            REVIEW OF SYSTEMS:  Constitutional: [ ] fever, [ ]weight loss,  [ ]fatigue [ ]weight gain  Eyes: [ ] visual changes  Respiratory: [ ]shortness of breath;  [ ] cough, [ ]wheezing, [ ]chills, [ ]hemoptysis  Cardiovascular: [ ] chest pain, [ ]palpitations, [ ]dizziness,  [ ]leg swelling[ ]orthopnea[ ]PND  Gastrointestinal: [ ] abdominal pain, [ ]nausea, [ ]vomiting,  [ ]diarrhea [ ]Constipation [ ]Melena  Genitourinary: [ ] dysuria, [ ] hematuria [ ]Vigil  Neurologic: [ ] headaches [ ] tremors[ ]weakness [ ]Paralysis Right[ ] Left[ ]  Skin: [ ] itching, [ ]burning, [ ] rashes  Endocrine: [ ] heat or cold intolerance  Musculoskeletal: [ ] joint pain or swelling; [ ] muscle, back, or extremity pain  Psychiatric: [ ] depression, [ ]anxiety, [ ]mood swings, or [ ]difficulty sleeping  Hematologic: [ ] easy bruising, [ ] bleeding gums    [ ] All remaining systems negative except as per above.   [x ]Unable to obtain.  [x] No change in ROS since admission      Vital Signs Last 24 Hrs  T(C): 37.1 (2025 04:00), Max: 38.3 (2025 20:00)  T(F): 98.7 (2025 04:00), Max: 100.9 (2025 20:00)  HR: 93 (2025 06:45) (72 - 121)  BP: 121/61 (2025 16:00) (121/61 - 121/61)  BP(mean): 82 (2025 16:00) (82 - 82)  RR: 21 (2025 06:45) (7 - 29)  SpO2: 100% (2025 06:45) (60% - 100%)    Parameters below as of 2025 05:48  Patient On (Oxygen Delivery Method): ventilator      I&O's Summary    2025 07:01  -  2025 07:00  --------------------------------------------------------  IN: 2024.2 mL / OUT: 3661 mL / NET: -1636.8 mL    2025 07:01  -  2025 06:45  --------------------------------------------------------  IN: 2190.5 mL / OUT: 2597 mL / NET: -406.5 mL        PHYSICAL EXAM:  General: No acute distress BMI-31  HEENT: EOMI, PERRL  Neck: Supple, [ ] JVD  Lungs: Equal air entry bilaterally; [ ] rales [ ] wheezing [ ] rhonchi  Heart: Irregular rate and rhythm; [x ] murmur   2/6 [ x] systolic [ ] diastolic [ ] radiation[ ] rubs [ ]  gallops  Abdomen: Nontender, bowel sounds present  Extremities: No clubbing, cyanosis, [ ] edema [ ]Pulses  equal and intact  Nervous system:  Sedated Intubated  Skin: No rashes or lesions    LABS:      134[L]  |  99  |  31[H]  ----------------------------<  172[H]  3.6   |  19[L]  |  2.17[H]    Ca    8.2[L]      2025 00:26  Phos  1.9       Mg     2.5         TPro  6.1  /  Alb  3.0[L]  /  TBili  2.3[H]  /  DBili  x   /  AST  180[H]  /  ALT  377[H]  /  AlkPhos  102      Creatinine Trend: 2.17<--, 2.45<--, 3.23<--, 3.81<--, 4.91<--, 5.93<--                        8.3    21.04 )-----------( 58       ( 2025 00:26 )             26.4     PT/INR - ( 2025 00:26 )   PT: 16.8 sec;   INR: 1.48 ratio         PTT - ( 2025 00:26 )  PTT:23.7 sec      Culture - Blood (25 @ 19:50)   Specimen Source: .Blood BLOOD  Culture Results: -No growth at 24 hours  Xray Chest 1 View- PORTABLE-Urgent (Xray Chest 1 View- PORTABLE-Urgent .) (25 @ 10:18) >  FINDINGS:  2025 at 2:29 AM  Endotracheal tube tip in the midtrachea, enteric tube tip out of view but  below the diaphragm.  Right IJ central venous catheter, with tip in the SVC. IABP with its tip  in the centimeter from the top the aortic arch.  Left chest tube overlying the left lower lung field.  The heart size cannot be accurately assessed on this AP projection.   Median sternotomy. Mediastinal surgical clips.  No focal consolidations.  There is no pneumothorax or sizable pleural effusion.      2025 at 9:57 AM:  Interval removal of left-sided chest tube. No pneumothorax. IABP 4.5 cm  from the top the aortic arch. Other lines and tubes unchanged.    IMPRESSION:  Interval removal of left-sided chest tube.  IABP 4.5 cm from the top the aortic arch.  No pneumothorax.  Other lines and tubes unchanged.      Culture - Body Fluid with Gram Stain (25 @ 15:34)   Gram Stain: polymorphonuclear leukocytes seen   No organisms seen by cytocentrifuge  Specimen Source: Peritoneal  Culture Results: No growth  TTE W or WO Ultrasound Enhancing Agent (25 @ 09:59) >  CONCLUSIONS:      1. Left ventricular systolic function is moderately decreased with an ejection fraction visually estimated at 40 %.   2. There is hypokinesis of the inferior and inferolateral walls.   3. Enlarged right ventricular cavity size and reduced right ventricular systolic function.   4.Pericardium:-There ira trace pericardial effusion noted adjacent to the posterior left ventricle.

## 2025-01-07 NOTE — PROGRESS NOTE ADULT - SUBJECTIVE AND OBJECTIVE BOX
Patient seen and examined at the bedside.    Remains critically ill on continuous ICU monitoring.      Brief Summary:  55 yo M with multiple medical problems including CAD s/p PCI in April 2024 s/p CABG x 3 on 12/30/24 1/2: Intubated for hypoxia & left lung white out s/p awake bronch with removal of copious mucopurulent secretions  1/4: s/p IABP insertion, sepsis/septic shock due to E coli     24 Hour events:  Continues on full vent support  CRRT hx of ESRD on HD   d/c IABP yesterday  Off Levophed    Objective:  Vital Signs Last 24 Hrs  T(C): 37.1 (07 Jan 2025 04:00), Max: 38.3 (06 Jan 2025 20:00)  T(F): 98.7 (07 Jan 2025 04:00), Max: 100.9 (06 Jan 2025 20:00)  HR: 96 (07 Jan 2025 06:30) (72 - 121)  BP: 121/61 (06 Jan 2025 16:00) (121/61 - 121/61)  BP(mean): 82 (06 Jan 2025 16:00) (82 - 82)  RR: 19 (07 Jan 2025 06:30) (7 - 29)  SpO2: 100% (07 Jan 2025 06:30) (60% - 100%)    Parameters below as of 07 Jan 2025 05:48  Patient On (Oxygen Delivery Method): ventilator    Mode: AC/ CMV (Assist Control/ Continuous Mandatory Ventilation)  RR (machine): 14  TV (machine): 450  FiO2: 40  PEEP: 8  ITime: 1  MAP: 11  PIP: 21    Physical Exam:  General: Intubated  Neurology: Sedated   Respiratory: Clear bilaterally   CV: Afib  Abdominal: Soft, Nontender  Extremities: Warm, well-perfused  IABP site ok.  -------------------------------------------------------------------------------------------------------------------------------    Labs:                        8.3    21.04 )-----------( 58       ( 07 Jan 2025 00:26 )             26.4     01-07    134[L]  |  99  |  31[H]  ----------------------------<  172[H]  3.6   |  19[L]  |  2.17[H]    Ca    8.2[L]      07 Jan 2025 00:26  Phos  1.9     01-07  Mg     2.5     01-07    TPro  6.1  /  Alb  3.0[L]  /  TBili  2.3[H]  /  DBili  x   /  AST  180[H]  /  ALT  377[H]  /  AlkPhos  102  01-07    LIVER FUNCTIONS - ( 07 Jan 2025 00:26 )  Alb: 3.0 g/dL / Pro: 6.1 g/dL / ALK PHOS: 102 U/L / ALT: 377 U/L / AST: 180 U/L / GGT: x           PT/INR - ( 07 Jan 2025 00:26 )   PT: 16.8 sec;   INR: 1.48 ratio       PTT - ( 07 Jan 2025 00:26 )  PTT:23.7 sec  ABG - ( 07 Jan 2025 06:09 )  pH, Arterial: 7.44  pH, Blood: x     /  pCO2: 32    /  pO2: 183   / HCO3: 22    / Base Excess: -2.0  /  SaO2: 99.3    ------------------------------------------------------------------------------------------------------------------------------  Assessment:  55 yo M s/p CABG x 3 on 12/30/24    Postop acute respiratory failure  Cardiogenic and Septic shock  Acute blood loss anemia  Transaminitis  Thrombocytopenia   ESRD on HD  E coli bacteremia 2/2 pneumonia  Leukocytosis     Plan:    ***Neuro***  Sedated with Precedex - RASS 0-1  Postoperative acute pain control with prn Fentanyl.   Colchicine     ***Cardiovascular***  s/p CABG x 3  d/c R. fem IABP 1/6  Wean Vasopressin for MAP > 65 mm Hg. Off Levophed  Remains on Dobutamine  Amiodarone infusion for a fib - transition to PO   Nitric oxide at 10 ppm - wean as tolerated   ASA / Plavix   d/c LP CT 1/6  Vascular surgery consulted for fistula evaluation  1/5 TTE     1. Left ventricular systolic function is moderately decreased with an ejection fraction visually estimated at 40 %.   2. There is hypokinesis of the inferior and inferolateral walls.   3. Enlarged right ventricular cavity size and reduced right ventricular systolic function.    ***Pulmonary***  Postoperative acute respiratory failure  Daily SAT/SBT  and PS as tolerated.   Continue vent support.    ***GI***  Advance tube feeds.  Protonix for stress ulcer prophylaxis.   Bowel regimen.  Transaminitis - will trend, avoid hepatotoxins.  Trend bilirubin.  Paracentesis 1/3 for suspected bacterial peritonitis - negative for SBP     ***Renal***  ESRD on CRRT - volume removal as tolerated.    ***ID***  Sepsis/septic shock due to ESBL E coli - Continue Merrem per ID recs.   Repeat blood cultures 1/5 1/2 Bcx ESBL E coli   1/3 Bronch ESBL E Coli   Trend leukocytosis.  Surgical chest wall wound well appearing - no active infection     ***Endocrine***  Hyperglycemia- Insulin sliding scale    ***Hematology***  Acute blood loss anemia and thrombocytopenia.  No current transfusion indication.  Off Heparin    *** Lines ***  RIJ Intro - 12/30 - consider replacing today with trialysis   R rad afia - 12/30  R fem HD cath - 1/3        Care plan discussed with the ICU care team.   Patient remains critical, at risk for life threatening decompensation.    I have spent 30 minutes providing critical care management to this patient.    By signing my name below, I, Baldemar Hernandez, attest that this documentation has been prepared under the direction and in the presence of Blaze Finch MD.  Electronically signed: Evelio Sevilla 01-07-25 @ 06:35    Blaze LEAHY MD,  personally performed the services described in this documentation. All medical record entries made by the scribe were at my direction and in my presence. I have reviewed the chart and agree that the record reflects my personal performance and is accurate and complete  Electronically signed: Blaze Finch MD. Patient seen and examined at the bedside.    Remains critically ill on continuous ICU monitoring.      Brief Summary:  53 yo M with multiple medical problems including CAD s/p PCI in April 2024 s/p CABG x 3 on 12/30/24 1/2: Intubated for hypoxia & left lung white out s/p awake bronch with removal of copious mucopurulent secretions  1/4: s/p IABP insertion, sepsis/septic shock due to E coli     24 Hour events:  PS during the day, rested overnight  Alex weaned to 10ppm   CRRT hx of ESRD on HD   d/c IABP yesterday  Off Levophed  remains on   Continues on Amio for new onset AF - received one bolus in 24 hrs for rates int he 120s     Objective:  Vital Signs Last 24 Hrs  T(C): 37.1 (07 Jan 2025 04:00), Max: 38.3 (06 Jan 2025 20:00)  T(F): 98.7 (07 Jan 2025 04:00), Max: 100.9 (06 Jan 2025 20:00)  HR: 96 (07 Jan 2025 06:30) (72 - 121)  BP: 121/61 (06 Jan 2025 16:00) (121/61 - 121/61)  BP(mean): 82 (06 Jan 2025 16:00) (82 - 82)  RR: 19 (07 Jan 2025 06:30) (7 - 29)  SpO2: 100% (07 Jan 2025 06:30) (60% - 100%)    Parameters below as of 07 Jan 2025 05:48  Patient On (Oxygen Delivery Method): ventilator    Mode: AC/ CMV (Assist Control/ Continuous Mandatory Ventilation)  RR (machine): 14  TV (machine): 450  FiO2: 40  PEEP: 8  ITime: 1  MAP: 11  PIP: 21    Physical Exam:  General: Intubated  Neurology: moving all extremities to command, non-focal   Respiratory: Clear bilaterally   CV: Afib  Abdominal: Soft, Nontender  Extremities: Warm, well-perfused  -------------------------------------------------------------------------------------------------------------------------------    Labs:                        8.3    21.04 )-----------( 58       ( 07 Jan 2025 00:26 )             26.4     01-07    134[L]  |  99  |  31[H]  ----------------------------<  172[H]  3.6   |  19[L]  |  2.17[H]    Ca    8.2[L]      07 Jan 2025 00:26  Phos  1.9     01-07  Mg     2.5     01-07    TPro  6.1  /  Alb  3.0[L]  /  TBili  2.3[H]  /  DBili  x   /  AST  180[H]  /  ALT  377[H]  /  AlkPhos  102  01-07    LIVER FUNCTIONS - ( 07 Jan 2025 00:26 )  Alb: 3.0 g/dL / Pro: 6.1 g/dL / ALK PHOS: 102 U/L / ALT: 377 U/L / AST: 180 U/L / GGT: x           PT/INR - ( 07 Jan 2025 00:26 )   PT: 16.8 sec;   INR: 1.48 ratio       PTT - ( 07 Jan 2025 00:26 )  PTT:23.7 sec  ABG - ( 07 Jan 2025 06:09 )  pH, Arterial: 7.44  pH, Blood: x     /  pCO2: 32    /  pO2: 183   / HCO3: 22    / Base Excess: -2.0  /  SaO2: 99.3    ------------------------------------------------------------------------------------------------------------------------------  Assessment:  53 yo M s/p CABG x 3 on 12/30/24    Postop acute respiratory failure  Cardiogenic and Septic shock  Acute blood loss anemia  Transaminitis  Thrombocytopenia   ESRD on HD  E coli bacteremia 2/2 pneumonia  Leukocytosis     Plan:    ***Neuro***  RASS goal 0-1, Precedex PRN   Postoperative acute pain control with prn Fentanyl.     ***Cardiovascular***  s/p CABG x 3  d/c R. fem IABP 1/6  Wean Vasopressin for MAP > 65 mm Hg. Off Levophed  Remains on Dobutamine  Amiodarone infusion for a fib - transition to PO once extubated   Nitric oxide at 10 ppm - wean today   Holding ASA / Plavix in setting of thrombocytopenia   Colchicine for pericarditis   Vascular surgery consulted for fistula evaluation  1/5 TTE     1. Left ventricular systolic function is moderately decreased with an ejection fraction visually estimated at 40 %.   2. There is hypokinesis of the inferior and inferolateral walls.   3. Enlarged right ventricular cavity size and reduced right ventricular systolic function.    ***Pulmonary***  Postoperative acute respiratory failure  Daily SAT/SBT  and PS as tolerated.   Continue vent support.    ***GI***  Advance tube feeds.  Protonix for stress ulcer prophylaxis.   Bowel regimen.  Transaminitis - will trend, avoid hepatotoxins.  Paracentesis 1/3 - negative for SBP     ***Renal***  ESRD on CRRT - volume removal as tolerated.  Plan for Slightly negative today     ***ID***  Sepsis/septic shock due to ESBL E. coli - Continue meropenem  Repeat blood cultures 1/5  1/2 Bcx ESBL E coli   1/3 Bronch ESBL E Coli   Trend leukocytosis.  Surgical chest wall wound well appearing - no active infection     ***Endocrine***  Hyperglycemia- Insulin sliding scale    ***Hematology***  Acute blood loss anemia and thrombocytopenia.  No current transfusion indication.  Off Heparin    *** Lines ***  RIJ Intro - 12/30 - consider replacing today with Trialysis vs TLC  R rad afia - 12/30  R fem HD cath - 1/3         Care plan discussed with the ICU care team.   Patient remains critical, at risk for life threatening decompensation.    I have spent 55 minutes providing critical care management to this patient.    By signing my name below, I, Baldemar Hernandez, attest that this documentation has been prepared under the direction and in the presence of Blaze Finch MD.  Electronically signed: Evelio Sevilla 01-07-25 @ 06:35    I, Blaze Finch MD,  personally performed the services described in this documentation. All medical record entries made by the scribe were at my direction and in my presence. I have reviewed the chart and agree that the record reflects my personal performance and is accurate and complete  Electronically signed: Blaze Finch MD. Surgeon Performing Repair (Optional): Rudolph Sanchez MD

## 2025-01-07 NOTE — PROGRESS NOTE ADULT - ASSESSMENT
54M with PMH of HTN, HLD, ESRD on HD MWF via LUE AVF, ascites (requiring repeat paracenteses q4-6wks), NICM with moderate LV dysfunction w/ EF 35-40%() and CAD s/p atherectomy + SALLIE to D1(2024) presents to [facility] transferred from [facility]. Patient initially presented to [facility] ED with shortness of breath. Of note he was recently admitted from - for acute cholecystitis s/p cholecystectomy. In [facility] ED, pt was hypoxic to 86% on RA, trop 1.7K, EKG no new changes, CXR fluid overload. Admitted for AHRF 2/2 fluid overload. Pt received HD on , ,  and . DAPT was resumed, TTE showed improvement in LVEF to 40-45%. Stress test was abnormal with 1mm inferior STD, reversible ischemia in the inferior wall and fixed defects in the lateral, anterior and apical walls with post stress EF of 24%.     Pt was then transferred to [facility] for cardiac cath which showed pLAD 70% ISR, mLCx 70%, D1 severe dz.     Patient now transferred to [facility] CTS Dr. Rivers for CABG eval.    # CAD s/p NSTEMI  Hx CAD s/p PCI LAD   Presented with ADHF elevated troponins  Positive NST  -Cath- pLAD 70% ISR, mLCx 70%, D1 severe dz.   TTE EF 35% Severe TRWas on Plavix-p2y12- 321 been off Plavix since -POD#1-C3L free LIMA-LAD; SVG-Diag; [id number] on  Sinus rhythm,Amio for AF prophylaxis  -OOB off  Sinus rhythm   -POD#3-On /pressors-EF 25-30% RV failure with transaminitis-Sinus Rhythm  -POD#4-Was intubated for hypoxia-gradual hypotension on /pressors had IABP inserted this morning  -POD#5-s/p IABP insertion, sepsis/septic shock due to GNR/ECOLI HD cath placed   Bronched yesterday 1/3 - minimal secretions with rust-tinged sputum   Paracentesis for suspected bacterial peritonitis-No organisms noted  Transfused 1u pbrcs overnight  Remains on inoptropes and pressors with IABP at 1:1  ESBL-E Coli bacteremia on meropenam    - POD#7 Atrial Fib ,remains intubated weaning IABP 1:3,off pressors on CRRT  -IABP removed.Remains on vent-Alex 10ppm,off Levo- CVP 10 / MAP 71 / Hct 25.6% / Lactate 1.7-Atrial Fibrillation      # Acute on Chronic Systolic Heart Failure  EF-35% ,severe TR  Had HD post op  GDMT post op  LVEF improved post bypass but now at 23-30%-BiV dysfunction on  now off pressors  -TTE EF 40%,trace effusion  ECG c/w pericarditis-on colchicine     Vascular evaluated  AVGraft /?steal causing RV failure-'' Very low suspicion of steal Sd and AVF causing current clinical state. ''   VA Duplex Hemodialysis Access, Left (25 @ 13:35) >   Arterial flow volume of 1301 mL/m, and the venous flow volume of  1492 mL/m are consistent with a normally functioning dialysis access  fistula.            # Ascites  Hx chronic ascites  Has drainage q4-6 weeks  Last drained -4600mL  ? ascites related to severe TR  Had [id number]-[id number]-wc growth   Monitor      # ESRD  Now on CRRT

## 2025-01-07 NOTE — PROGRESS NOTE ADULT - SUBJECTIVE AND OBJECTIVE BOX
[facility]    Dr. Nica Styles     Office [phone] (9 am to 5 pm)  Service: [phone] (5pm to 9am)  Also Available on TEAMS      RENAL PROGRESS NOTE: DATE OF SERVICE 01-07-25 @ 12:06    Patient is a 54y old  Male who presents with a chief complaint of CAD s/p CABG (07 Jan 2025 06:45)      Patient seen and examined at bedside. No chest pain/sob    VITALS:  T(F): 100.4 (01-07-25 @ 12:00), Max: 101.8 (01-07-25 @ 08:00)  HR: 93 (01-07-25 @ 11:45)  BP: 121/61 (01-06-25 @ 16:00)  RR: 25 (01-07-25 @ 11:45)  SpO2: 100% (01-07-25 @ 11:45)  Wt(kg): --    01-06 @ 07:01  -  01-07 @ 07:00  --------------------------------------------------------  IN: 2190.5 mL / OUT: 2817 mL / NET: -626.5 mL    01-07 @ 07:01  -  01-07 @ 12:06  --------------------------------------------------------  IN: 320.8 mL / OUT: 819 mL / NET: -498.2 mL          PHYSICAL EXAM:  Constitutional: NAD  Neck: No JVD  Respiratory: CTAB, no wheezes, rales or rhonchi  Cardiovascular: S1, S2, RRR  Gastrointestinal: BS+, soft, NT/ND  Extremities: No peripheral edema    Hospital Medications:   MEDICATIONS  (STANDING):  albuterol/ipratropium for Nebulization 3 milliLiter(s) Nebulizer every 6 hours  aMIOdarone Infusion 0.5 mG/Min (16.7 mL/Hr) IV Continuous <Continuous>  aspirin  chewable 81 milliGRAM(s) Oral daily  bisacodyl Suppository 10 milliGRAM(s) Rectal once  chlorhexidine 0.12% Liquid 15 milliLiter(s) Oral Mucosa every 12 hours  chlorhexidine 2% Cloths 1 Application(s) Topical daily  colchicine 0.3 milliGRAM(s) Oral daily  CRRT Treatment    <Continuous>  dexMEDEtomidine Infusion 0.5 MICROgram(s)/kG/Hr (10.6 mL/Hr) IV Continuous <Continuous>  dextrose 50% Injectable 50 milliLiter(s) IV Push every 15 minutes  dextrose 50% Injectable 25 milliLiter(s) IV Push every 15 minutes  DOBUTamine Infusion 5 MICROgram(s)/kG/Min (6.38 mL/Hr) IV Continuous <Continuous>  epoetin ignacia (EPOGEN) Injectable 33709 Unit(s) IV Push <User Schedule>  insulin lispro (ADMELOG) corrective regimen sliding scale   SubCutaneous every 6 hours  lidocaine   4% Patch 2 Patch Transdermal daily  meropenem  IVPB 1000 milliGRAM(s) IV Intermittent every 8 hours  Nephro-elicia 1 Tablet(s) Oral daily  norepinephrine Infusion 0.05 MICROgram(s)/kG/Min (7.98 mL/Hr) IV Continuous <Continuous>  pantoprazole  Injectable 40 milliGRAM(s) IV Push daily  Phoxillum Filtration BK 4 / 2.5 5000 milliLiter(s) (200 mL/Hr) CRRT <Continuous>  Phoxillum Filtration BK 4 / 2.5 5000 milliLiter(s) (800 mL/Hr) CRRT <Continuous>  polyethylene glycol 3350 17 Gram(s) Oral two times a day  PrismaSATE Dialysate BGK 4 / 2.5 5000 milliLiter(s) (1000 mL/Hr) CRRT <Continuous>  senna 2 Tablet(s) Oral at bedtime  sodium chloride 0.9%. 1000 milliLiter(s) (10 mL/Hr) IV Continuous <Continuous>  vasopressin Infusion 0.03 Unit(s)/Min (4.5 mL/Hr) IV Continuous <Continuous>      LABS:  01-07    134[L]  |  99  |  31[H]  ----------------------------<  172[H]  3.6   |  19[L]  |  2.17[H]    Ca    8.2[L]      07 Jan 2025 00:26  Phos  1.9     01-07  Mg     2.5     01-07    TPro  6.1  /  Alb  3.0[L]  /  TBili  2.3[H]  /  DBili      /  AST  180[H]  /  ALT  377[H]  /  AlkPhos  102  01-07    Creatinine Trend: 2.17 <--, 2.45 <--, 3.23 <--, 3.81 <--, 4.91 <--, 5.93 <--, 6.32 <--, 6.21 <--, 5.03 <--, 4.67 <--, 3.71 <--    Albumin: 3.0 g/dL (01-07 @ 00:26)  Phosphorus: 1.9 mg/dL (01-07 @ 00:26)                              8.3    21.04 )-----------( 58       ( 07 Jan 2025 00:26 )             26.4     Urine Studies:  Urinalysis - [01-07-25 @ 00:26]      Color  / Appearance  / SG  / pH       Gluc 172 / Ketone   / Bili  / Urobili        Blood  / Protein  / Leuk Est  / Nitrite       RBC  / WBC  / Hyaline  / Gran  / Sq Epi  / Non Sq Epi  / Bacteria       Iron 29, TIBC 143, %sat 20      [12-19-24 @ 05:00]  Ferritin 904      [12-19-24 @ 05:00]  TSH 4.75      [12-24-24 @ 06:50]    HBsAg Nonreact      [12-19-24 @ 05:00]      RADIOLOGY & ADDITIONAL STUDIES:   No

## 2025-01-07 NOTE — PROGRESS NOTE ADULT - ASSESSMENT
54M with PMH of HTN, HLD, ESRD on HD MWF via LUE AVF, ascites (requiring repeat paracenteses q4-6wks), NICM with moderate LV dysfunction w/ EF 35-40%() and CAD s/p atherectomy + SALLIE to D1(2024) presents to [facility] transferred from [facility]. Patient initially presented to [facility] ED with shortness of breath. Of note he was recently admitted from - for acute cholecystitis s/p cholecystectomy. In [facility] ED, pt was hypoxic to 86% on RA, trop 1.7K, EKG no new changes, CXR fluid overload. Admitted for AHRF 2/2 fluid overload. Pt received HD on , ,  and . DAPT was resumed, TTE showed improvement in LVEF to 40-45%. Stress test was abnormal with 1mm inferior STD, reversible ischemia in the inferior wall and fixed defects in the lateral, anterior and apical walls with post stress EF of 24%. Pt was then transferred to [facility] for cardiac cath which showed pLAD 70% ISR, mLCx 70%, D1 severe dz. Patient now transferred to [facility] CTS Dr. Rivers for CABG eval. Patient denies any current SOB or chest pain on admission. Otherwise denies headaches, abdominal pain, urinary or bowel changes, fevers, chills, N/V/D, sick contacts.  (24 Dec 2024 05:07)   VSS  CP free   HD via avf  for daily HD pre op- on lovenox 30 qd    HD today continue with present meds. Plan for CABG possibleTVR next week   vss; rsr 55-80; hd today w/ 1 unit prbc; pt to be dialzyed also this  w/ another 1 unit prbc   VSS; RSR 60-80; continue asa/bb/ statin; HD in am - transfuse 1 unit prbc with HD; d/c lovenox after am dose sun    - Pt underwent  C3L free LIMA-LAD; SVG-Diag; SVG-leftPL  Pt given cefuroxime perioperatively   pt extubated   . pt with hypotension and ECHO showing Systolic HF/depressed BIV Function, currently supported with /pressors.  Labs this evening showing transaminitis  1/2 -3 pt hypotensive, febrile and reintubated  Pt pancultured. and started on zosyn- meropenem and gent  pt found to have Gram negative bacteremia    E coli +ESBL bacteremia        A/P  #sepsis  ? source - many options. Pt with abnormal u/a but is a dialysis pt so hard to interpret. Pt with h//o SBP- pt with ascites   s/p  paracentesis 1/3  cultures No Growth to date  - no organisms notes on gram stain  could be from lungs but ? effusion vs infiltrate.  Continue meropenem  please reculture for fever  lines are being changed  if fevers persist , please obtain CT of head ( sinuses) chest and abdomen .Pt with recent cholecystectomy, pt with h/o SBP, pt with large secretions.   check c diff if develops diarrhea    #elevated LFTs  likely shock liver  acute  hepatitis serology- negative   trend  avoid hepatotoxic meds   still quite elevated but improving     #leukopenia  likely from sepsis  improved today    #thrombocytopenia   HIt ab negative  Likely from sepsis vs from IABP or drugs   trend         Marla Champion M.D. ,   please reach via teams   If no answer, or after 5PM/ weekends,  then please call  [phone]    Assessment and plan discussed with the primary team .

## 2025-01-07 NOTE — PROGRESS NOTE ADULT - SUBJECTIVE AND OBJECTIVE BOX
Patient is a 54y old  Male who presents with a chief complaint of CAD s/p CABG (07 Jan 2025 06:45)    Being followed by ID for        Interval history:  pt remains febrile   pt remains intubated but lessening requirements  still with large secretions  one loose BM last night, none today  No other acute events      PAST MEDICAL & SURGICAL HISTORY:  Type 2 diabetes mellitus      Hypertension      End stage renal disease  started HD 2/2019 T, Th, Sat via right chest permacath      Bone spur  right shoulder- hx of - sx done      Anemia      Injury of right wrist  hx of at age 14      History of hyperkalemia  before HD- K-6.2 - to repeat in am of sx, pt had HD today      S/P arthroscopy of right shoulder  2010      History of vascular access device  right chest permacath - 2/2019      H/O right wrist surgery  tendons repair - at age 14      AV fistula      H/O ventral hernia repair      S/P cholecystectomy        Allergies    No Known Allergies    Intolerances      Antimicrobials:    meropenem  IVPB 1000 milliGRAM(s) IV Intermittent every 8 hours    MEDICATIONS  (STANDING):  albuterol/ipratropium for Nebulization 3 milliLiter(s) Nebulizer every 6 hours  aMIOdarone Infusion 0.5 mG/Min (16.7 mL/Hr) IV Continuous <Continuous>  aspirin  chewable 81 milliGRAM(s) Oral daily  bisacodyl Suppository 10 milliGRAM(s) Rectal once  chlorhexidine 0.12% Liquid 15 milliLiter(s) Oral Mucosa every 12 hours  chlorhexidine 2% Cloths 1 Application(s) Topical daily  colchicine 0.3 milliGRAM(s) Oral daily  CRRT Treatment    <Continuous>  dexMEDEtomidine Infusion 0.5 MICROgram(s)/kG/Hr (10.6 mL/Hr) IV Continuous <Continuous>  dextrose 50% Injectable 50 milliLiter(s) IV Push every 15 minutes  dextrose 50% Injectable 25 milliLiter(s) IV Push every 15 minutes  DOBUTamine Infusion 5 MICROgram(s)/kG/Min (6.38 mL/Hr) IV Continuous <Continuous>  epoetin ignacia (EPOGEN) Injectable 72015 Unit(s) IV Push <User Schedule>  insulin lispro (ADMELOG) corrective regimen sliding scale   SubCutaneous every 6 hours  lidocaine   4% Patch 2 Patch Transdermal daily  meropenem  IVPB 1000 milliGRAM(s) IV Intermittent every 8 hours  Nephro-elicia 1 Tablet(s) Oral daily  norepinephrine Infusion 0.05 MICROgram(s)/kG/Min (7.98 mL/Hr) IV Continuous <Continuous>  pantoprazole  Injectable 40 milliGRAM(s) IV Push daily  Phoxillum Filtration BK 4 / 2.5 5000 milliLiter(s) (800 mL/Hr) CRRT <Continuous>  Phoxillum Filtration BK 4 / 2.5 5000 milliLiter(s) (200 mL/Hr) CRRT <Continuous>  polyethylene glycol 3350 17 Gram(s) Oral two times a day  PrismaSATE Dialysate BGK 4 / 2.5 5000 milliLiter(s) (1000 mL/Hr) CRRT <Continuous>  senna 2 Tablet(s) Oral at bedtime  sodium chloride 0.9%. 1000 milliLiter(s) (10 mL/Hr) IV Continuous <Continuous>  vasopressin Infusion 0.03 Unit(s)/Min (4.5 mL/Hr) IV Continuous <Continuous>      Vital Signs Last 24 Hrs  T(C): 38 (01-07-25 @ 12:00), Max: 38.8 (01-07-25 @ 08:00)  T(F): 100.4 (01-07-25 @ 12:00), Max: 101.8 (01-07-25 @ 08:00)  HR: 84 (01-07-25 @ 15:00) (77 - 121)  BP: 121/61 (01-06-25 @ 16:00) (121/61 - 121/61)  BP(mean): 82 (01-06-25 @ 16:00) (82 - 82)  RR: 17 (01-07-25 @ 15:00) (7 - 29)  SpO2: 95% (01-07-25 @ 15:00) (60% - 100%)    Physical Exam:    Constitutional intubated  NG tube awake    HEENT PERRLA EOMI,No pallor or icterus    Neck supple no JVD or LN    Chest Good AE,CTA    CVS  S1 S2    Abd soft BS normal No tenderness     Ext No cyanosis clubbing or edema    IV site no erythema tenderness or discharge    CNS AAO X 3 no focal    Lab Data:                          8.3    21.04 )-----------( 58       ( 07 Jan 2025 00:26 )             26.4       01-07    134[L]  |  99  |  31[H]  ----------------------------<  172[H]  3.6   |  19[L]  |  2.17[H]    Ca    8.2[L]      07 Jan 2025 00:26  Phos  1.9     01-07  Mg     2.5     01-07    TPro  6.1  /  Alb  3.0[L]  /  TBili  2.3[H]  /  DBili  x   /  AST  180[H]  /  ALT  377[H]  /  AlkPhos  102  01-07          .Blood BLOOD  01-05-25   No growth at 24 hours  --  --      .Blood BLOOD  01-05-25   No growth at 24 hours  --  --      Peritoneal  01-03-25   No growth  --    polymorphonuclear leukocytes seen  No organisms seen  by cytocentrifuge      Bronchial  01-03-25   Culture is being performed.  --  --      Bronchial  01-03-25   Numerous Escherichia coli ESBL  Commensal manjit consistent with body site  --  Escherichia coli ESBL      .Blood BLOOD  01-02-25   Growth in aerobic and anaerobic bottles: Escherichia coli ESBL  --  Escherichia coli ESBL      .Blood BLOOD  01-02-25   Growth in aerobic and anaerobic bottles: Escherichia coli ESBL  See previous culture [id number]  Direct identification is available within approximately 3-5  hours either by Blood Panel Multiplexed PCR or Direct  MALDI-TOF. Details: [email or web address]  --  Blood Culture PCR      .Sputum Sputum  01-02-25   Numerous Escherichia coli ESBL  Commensal manjit consistent with body site  --  Escherichia coli ESBL    Vancomycin Level, Trough: 12.4 ug/mL (01-05-25 @ 20:19)      WBC Count: 21.04 (01-07-25 @ 00:26)  WBC Count: 23.28 (01-06-25 @ 18:25)  WBC Count: 21.81 (01-06-25 @ 13:30)  WBC Count: 23.31 (01-06-25 @ 00:35)  WBC Count: 24.30 (01-05-25 @ 00:48)  WBC Count: 19.46 (01-04-25 @ 14:33)  WBC Count: 13.29 (01-04-25 @ 00:56)  WBC Count: 9.39 (01-03-25 @ 16:17)  WBC Count: 4.71 (01-03-25 @ 09:25)  WBC Count: 1.11 (01-03-25 @ 01:31)  WBC Count: 1.35 (01-03-25 @ 00:43)  WBC Count: 14.34 (01-02-25 @ 00:33)  WBC Count: 14.49 (01-01-25 @ 00:24)       Bilirubin Total: 2.3 mg/dL (01-07-25 @ 00:26)  Aspartate Aminotransferase (AST/SGOT): 180 U/L (01-07-25 @ 00:26)  Alanine Aminotransferase (ALT/SGPT): 377 U/L (01-07-25 @ 00:26)  Alkaline Phosphatase: 102 U/L (01-07-25 @ 00:26)      Bilirubin Total: 2.6 mg/dL (01-05-25 @ 00:47)  Aspartate Aminotransferase (AST/SGOT): 560 U/L (01-05-25 @ 00:47)  Alkaline Phosphatase: 108 U/L (01-05-25 @ 00:47)  Alanine Aminotransferase (ALT/SGPT): 787 U/L (01-05-25 @ 00:47)    Bilirubin Total: 2.0 mg/dL (01-04-25 @ 00:56)  Alkaline Phosphatase: 73 U/L (01-04-25 @ 00:56)  Aspartate Aminotransferase (AST/SGOT): 1029 U/L (01-04-25 @ 00:56)  Alanine Aminotransferase (ALT/SGPT): 938 U/L (01-04-25 @ 00:56)      Bilirubin Total: 1.5 mg/dL (01-03-25 @ 09:25)  Alkaline Phosphatase: 76 U/L (01-03-25 @ 09:25)  Aspartate Aminotransferase (AST/SGOT): 1170 U/L (01-03-25 @ 09:25)  Alanine Aminotransferase (ALT/SGPT): 927 U/L (01-03-25 @ 09:25)      MRSA/MSSA PCR (12.24.24 @ 07:15)    MRSA PCR Result.: NotDetec: The results of this test should be interpreted with consideration of  clinical context.  Not Detected result indicates the absence of organisms or that the number  of organisms is below the assay limit of detection.  Detected result indicates the presence of organism nucleic acid.  Indeterminate result may indicate the presence of amplification  inhibitors in specimen; presence or absence of organisms cannot be  determined. Consider collecting new specimen if further testing is still  needed.  This qualitative PCR assay is FDA-approved, and its performance was  established by [facility] [employer] [facility], [place], [place].   Staph aureus PCR Result: Detected

## 2025-01-07 NOTE — PROCEDURE NOTE - ADDITIONAL PROCEDURE DETAILS
Pre- Procedure   Patient and/or family/surrogate educated about central line-associated blood stream infection prevention practices  Central Line insertion checklist utilized    Catheter Type: (  ) Dialysis  (  ) Introducer  ( X) Central venous catheter   (  ) peripherally inserted central catheter (PICC)      Number of Lumens: (  ) single   (  ) Double   (  X ) Triple  (  ) Antimicrobial catheter    TIME OUT performed prior ti this procedure with verbal confirmation of correct patient identity, correct site, side amd noemi (if applicable), agreement of the procedure, correct patient position, availability of necessary equipment.  The consent form (if applicable) is complete and accurate.  Safety precautions based on patient history or medication use has been addressed   RN at bedside    Procedure  The patient was placed in the Supine position, Trendelenburg position. The patient's placement site was prepped with Chlorhexidine solution then drapped using maximum sterile barrier protection.  The area was injected with 3 cc of 1% Lidocaine.  Using the Ultrasound, the catheter was introduced.  Strict adherence to outlined aseptic technique, including hand washing prior to donning sterile barriers (gloves and gown), utilizing a mask and cap, plus draping the patient with a sterile drape was observed.  Upon completion of the line placement, the insertion site was covered with sterile dressing.    All materials used for catheter insertion, including the intact guide wire, were accounted for at the end of the procedure      Emergency placement ( X ) No    (   ) Yes   (   ) New Site    (   )  Guide Wire change      Attempted site and actual site (   ) Right  ( X  )  Left  Jugular      Post Procedure (Catheter Placement Verification)  Correct placement confirmed by ultrasonography and venous saturation  The Tip of the catheter is confirmed to be in appropriate position by radiography which revealed no pneumothorax and line may be accessed Pre- Procedure   Patient and/or family/surrogate educated about central line-associated blood stream infection prevention practices  Central Line insertion checklist utilized    Catheter Type: (  ) Dialysis  (  ) Introducer  ( X) Central venous catheter   (  ) peripherally inserted central catheter (PICC)      Number of Lumens: (  ) single   (  ) Double   (  X ) Triple  (  ) Antimicrobial catheter    TIME OUT performed prior ti this procedure with verbal confirmation of correct patient identity, correct site, side and noemi (if applicable), agreement of the procedure, correct patient position, availability of necessary equipment.  The consent form (if applicable) is complete and accurate.  Safety precautions based on patient history or medication use has been addressed   RN at bedside    Procedure  The patient was placed in the Supine position, Trendelenburg position. The patient's placement site was prepped with Chlorhexidine solution then draped using maximum sterile barrier protection.  The area was injected with 3 cc of 1% Lidocaine.  Using the Ultrasound, the catheter was introduced.  Strict adherence to outlined aseptic technique, including hand washing prior to donning sterile barriers (gloves and gown), utilizing a mask and cap, plus draping the patient with a sterile drape was observed.  Upon completion of the line placement, the insertion site was covered with sterile dressing.    All materials used for catheter insertion, including the intact guide wire, were accounted for at the end of the procedure      Emergency placement ( X ) No    (   ) Yes   (   ) New Site    (   )  Guide Wire change      Attempted site and actual site (   ) Right  ( X  )  Left  Jugular      Post Procedure (Catheter Placement Verification)  Correct placement confirmed by ultrasonography and venous saturation  The Tip of the catheter is confirmed to be in appropriate position by radiography which revealed no pneumothorax and line may be accessed

## 2025-01-08 LAB
ALBUMIN SERPL ELPH-MCNC: 2.9 G/DL — LOW (ref 3.3–5)
ALP SERPL-CCNC: 106 U/L — SIGNIFICANT CHANGE UP (ref 40–120)
ALT FLD-CCNC: 240 U/L — HIGH (ref 10–45)
ANION GAP SERPL CALC-SCNC: 15 MMOL/L — SIGNIFICANT CHANGE UP (ref 5–17)
APTT BLD: 30.6 SEC — SIGNIFICANT CHANGE UP (ref 24.5–35.6)
APTT BLD: 30.9 SEC — SIGNIFICANT CHANGE UP (ref 24.5–35.6)
AST SERPL-CCNC: 97 U/L — HIGH (ref 10–40)
BASOPHILS # BLD AUTO: 0.14 K/UL — SIGNIFICANT CHANGE UP (ref 0–0.2)
BASOPHILS NFR BLD AUTO: 0.5 % — SIGNIFICANT CHANGE UP (ref 0–2)
BILIRUB SERPL-MCNC: 1.9 MG/DL — HIGH (ref 0.2–1.2)
BLD GP AB SCN SERPL QL: NEGATIVE — SIGNIFICANT CHANGE UP
BUN SERPL-MCNC: 30 MG/DL — HIGH (ref 7–23)
CALCIUM SERPL-MCNC: 7.9 MG/DL — LOW (ref 8.4–10.5)
CHLORIDE SERPL-SCNC: 101 MMOL/L — SIGNIFICANT CHANGE UP (ref 96–108)
CO2 SERPL-SCNC: 19 MMOL/L — LOW (ref 22–31)
CULTURE RESULTS: SIGNIFICANT CHANGE UP
EGFR: 39 ML/MIN/1.73M2 — LOW
EGFR: 39 ML/MIN/1.73M2 — LOW
EOSINOPHIL # BLD AUTO: 0.11 K/UL — SIGNIFICANT CHANGE UP (ref 0–0.5)
EOSINOPHIL NFR BLD AUTO: 0.4 % — SIGNIFICANT CHANGE UP (ref 0–6)
FIBRINOGEN PPP-MCNC: 359 MG/DL — SIGNIFICANT CHANGE UP (ref 200–445)
GAS PNL BLDA: SIGNIFICANT CHANGE UP
GAS PNL BLDV: SIGNIFICANT CHANGE UP
GAS PNL BLDV: SIGNIFICANT CHANGE UP
GLUCOSE SERPL-MCNC: 133 MG/DL — HIGH (ref 70–99)
HCT VFR BLD CALC: 25.8 % — LOW (ref 39–50)
HGB BLD-MCNC: 8.2 G/DL — LOW (ref 13–17)
IMM GRANULOCYTES NFR BLD AUTO: 3.5 % — HIGH (ref 0–0.9)
INR BLD: 1.37 RATIO — HIGH (ref 0.85–1.16)
INR BLD: 1.43 RATIO — HIGH (ref 0.85–1.16)
LYMPHOCYTES # BLD AUTO: 0.61 K/UL — LOW (ref 1–3.3)
LYMPHOCYTES # BLD AUTO: 2.4 % — LOW (ref 13–44)
MAGNESIUM SERPL-MCNC: 2.5 MG/DL — SIGNIFICANT CHANGE UP (ref 1.6–2.6)
MCHC RBC-ENTMCNC: 30.7 PG — SIGNIFICANT CHANGE UP (ref 27–34)
MCHC RBC-ENTMCNC: 31.8 G/DL — LOW (ref 32–36)
MCV RBC AUTO: 96.6 FL — SIGNIFICANT CHANGE UP (ref 80–100)
MONOCYTES # BLD AUTO: 1.24 K/UL — HIGH (ref 0–0.9)
MONOCYTES NFR BLD AUTO: 4.8 % — SIGNIFICANT CHANGE UP (ref 2–14)
NEUTROPHILS # BLD AUTO: 22.63 K/UL — HIGH (ref 1.8–7.4)
NEUTROPHILS NFR BLD AUTO: 88.4 % — HIGH (ref 43–77)
NRBC # BLD: 0 /100 WBCS — SIGNIFICANT CHANGE UP (ref 0–0)
NRBC BLD-RTO: 0 /100 WBCS — SIGNIFICANT CHANGE UP (ref 0–0)
PHOSPHATE SERPL-MCNC: 2.6 MG/DL — SIGNIFICANT CHANGE UP (ref 2.5–4.5)
PLATELET # BLD AUTO: 65 K/UL — LOW (ref 150–400)
POTASSIUM SERPL-MCNC: 3.6 MMOL/L — SIGNIFICANT CHANGE UP (ref 3.5–5.3)
POTASSIUM SERPL-SCNC: 3.6 MMOL/L — SIGNIFICANT CHANGE UP (ref 3.5–5.3)
PROT SERPL-MCNC: 5.8 G/DL — LOW (ref 6–8.3)
PROTHROM AB SERPL-ACNC: 15.7 SEC — HIGH (ref 9.9–13.4)
PROTHROM AB SERPL-ACNC: 16.3 SEC — HIGH (ref 9.9–13.4)
RBC # BLD: 2.67 M/UL — LOW (ref 4.2–5.8)
RBC # FLD: 18.2 % — HIGH (ref 10.3–14.5)
RH IG SCN BLD-IMP: POSITIVE — SIGNIFICANT CHANGE UP
SODIUM SERPL-SCNC: 135 MMOL/L — SIGNIFICANT CHANGE UP (ref 135–145)
SPECIMEN SOURCE: SIGNIFICANT CHANGE UP
WBC # FLD AUTO: 25.63 K/UL — HIGH (ref 3.8–10.5)

## 2025-01-08 PROCEDURE — 31645 BRNCHSC W/THER ASPIR 1ST: CPT

## 2025-01-08 PROCEDURE — G0545: CPT

## 2025-01-08 PROCEDURE — 99222 1ST HOSP IP/OBS MODERATE 55: CPT | Mod: 25

## 2025-01-08 PROCEDURE — 99232 SBSQ HOSP IP/OBS MODERATE 35: CPT

## 2025-01-08 PROCEDURE — 99292 CRITICAL CARE ADDL 30 MIN: CPT | Mod: FS,25

## 2025-01-08 PROCEDURE — 99221 1ST HOSP IP/OBS SF/LOW 40: CPT | Mod: 25

## 2025-01-08 PROCEDURE — 71045 X-RAY EXAM CHEST 1 VIEW: CPT | Mod: 26,76

## 2025-01-08 PROCEDURE — 99291 CRITICAL CARE FIRST HOUR: CPT | Mod: 25

## 2025-01-08 RX ORDER — BUDESONIDE 0.25 MG/2ML
0.25 SUSPENSION RESPIRATORY (INHALATION) EVERY 12 HOURS
Refills: 0 | Status: DISCONTINUED | OUTPATIENT
Start: 2025-01-08 | End: 2025-01-11

## 2025-01-08 RX ORDER — DOBUTAMINE 250 MG/20ML
1 INJECTION INTRAVENOUS
Qty: 1000 | Refills: 0 | Status: DISCONTINUED | OUTPATIENT
Start: 2025-01-08 | End: 2025-01-15

## 2025-01-08 RX ORDER — HEPARIN SODIUM 1000 [USP'U]/ML
300 INJECTION INTRAVENOUS; SUBCUTANEOUS
Qty: 25000 | Refills: 0 | Status: DISCONTINUED | OUTPATIENT
Start: 2025-01-08 | End: 2025-01-15

## 2025-01-08 RX ORDER — ALTEPLASE 2.2 MG/2ML
2 INJECTION, POWDER, LYOPHILIZED, FOR SOLUTION INTRAVENOUS ONCE
Refills: 0 | Status: COMPLETED | OUTPATIENT
Start: 2025-01-08 | End: 2025-01-08

## 2025-01-08 RX ORDER — DOBUTAMINE 250 MG/20ML
5 INJECTION INTRAVENOUS
Qty: 500 | Refills: 0 | Status: DISCONTINUED | OUTPATIENT
Start: 2025-01-08 | End: 2025-01-08

## 2025-01-08 RX ORDER — PROPOFOL 10 MG/ML
90 INJECTION, EMULSION INTRAVENOUS ONCE
Refills: 0 | Status: COMPLETED | OUTPATIENT
Start: 2025-01-08 | End: 2025-01-08

## 2025-01-08 RX ORDER — FENTANYL CITRATE-0.9 % NACL/PF 100MCG/2ML
25 SYRINGE (ML) INTRAVENOUS ONCE
Refills: 0 | Status: DISCONTINUED | OUTPATIENT
Start: 2025-01-08 | End: 2025-01-08

## 2025-01-08 RX ADMIN — IPRATROPIUM BROMIDE AND ALBUTEROL SULFATE 3 MILLILITER(S): .5; 2.5 SOLUTION RESPIRATORY (INHALATION) at 05:11

## 2025-01-08 RX ADMIN — Medication 25 MICROGRAM(S): at 05:48

## 2025-01-08 RX ADMIN — MEROPENEM 100 MILLIGRAM(S): 1 INJECTION INTRAVENOUS at 12:01

## 2025-01-08 RX ADMIN — IPRATROPIUM BROMIDE AND ALBUTEROL SULFATE 3 MILLILITER(S): .5; 2.5 SOLUTION RESPIRATORY (INHALATION) at 23:04

## 2025-01-08 RX ADMIN — VASOPRESSIN 4.5 UNIT(S)/MIN: 20 INJECTION INTRAVENOUS at 19:28

## 2025-01-08 RX ADMIN — Medication 10 MILLILITER(S): at 19:29

## 2025-01-08 RX ADMIN — MEROPENEM 100 MILLIGRAM(S): 1 INJECTION INTRAVENOUS at 20:40

## 2025-01-08 RX ADMIN — ALTEPLASE 2 MILLIGRAM(S): 2.2 INJECTION, POWDER, LYOPHILIZED, FOR SOLUTION INTRAVENOUS at 18:01

## 2025-01-08 RX ADMIN — Medication 10 MILLILITER(S): at 12:36

## 2025-01-08 RX ADMIN — Medication 15 MILLILITER(S): at 05:46

## 2025-01-08 RX ADMIN — DEXMEDETOMIDINE HYDROCHLORIDE IN SODIUM CHLORIDE 10.6 MICROGRAM(S)/KG/HR: 4 INJECTION INTRAVENOUS at 08:21

## 2025-01-08 RX ADMIN — DOBUTAMINE 3.19 MICROGRAM(S)/KG/MIN: 250 INJECTION INTRAVENOUS at 12:03

## 2025-01-08 RX ADMIN — Medication 25 MICROGRAM(S): at 23:30

## 2025-01-08 RX ADMIN — Medication 25 MICROGRAM(S): at 20:30

## 2025-01-08 RX ADMIN — Medication 40 MILLIGRAM(S): at 11:16

## 2025-01-08 RX ADMIN — Medication 81 MILLIGRAM(S): at 11:27

## 2025-01-08 RX ADMIN — IPRATROPIUM BROMIDE AND ALBUTEROL SULFATE 3 MILLILITER(S): .5; 2.5 SOLUTION RESPIRATORY (INHALATION) at 11:45

## 2025-01-08 RX ADMIN — Medication 25 MICROGRAM(S): at 23:45

## 2025-01-08 RX ADMIN — COLCHICINE 0.3 MILLIGRAM(S): 0.6 TABLET, FILM COATED ORAL at 11:26

## 2025-01-08 RX ADMIN — HEPARIN SODIUM 3 UNIT(S)/HR: 1000 INJECTION INTRAVENOUS; SUBCUTANEOUS at 17:28

## 2025-01-08 RX ADMIN — Medication 25 MICROGRAM(S): at 12:30

## 2025-01-08 RX ADMIN — MEROPENEM 100 MILLIGRAM(S): 1 INJECTION INTRAVENOUS at 05:47

## 2025-01-08 RX ADMIN — Medication 1 APPLICATION(S): at 06:24

## 2025-01-08 RX ADMIN — IPRATROPIUM BROMIDE AND ALBUTEROL SULFATE 3 MILLILITER(S): .5; 2.5 SOLUTION RESPIRATORY (INHALATION) at 17:49

## 2025-01-08 RX ADMIN — POLYETHYLENE GLYCOL 3350 17 GRAM(S): 17 POWDER, FOR SOLUTION ORAL at 05:46

## 2025-01-08 RX ADMIN — Medication 1 APPLICATION(S): at 11:21

## 2025-01-08 RX ADMIN — Medication 25 MICROGRAM(S): at 12:19

## 2025-01-08 RX ADMIN — Medication 25 MICROGRAM(S): at 20:45

## 2025-01-08 RX ADMIN — HEPARIN SODIUM 3 UNIT(S)/HR: 1000 INJECTION INTRAVENOUS; SUBCUTANEOUS at 19:29

## 2025-01-08 RX ADMIN — DOBUTAMINE 3.19 MICROGRAM(S)/KG/MIN: 250 INJECTION INTRAVENOUS at 21:00

## 2025-01-08 RX ADMIN — INSULIN LISPRO 2: 100 INJECTION, SOLUTION INTRAVENOUS; SUBCUTANEOUS at 11:24

## 2025-01-08 RX ADMIN — INSULIN LISPRO 4: 100 INJECTION, SOLUTION INTRAVENOUS; SUBCUTANEOUS at 17:59

## 2025-01-08 RX ADMIN — Medication 25 MICROGRAM(S): at 06:18

## 2025-01-08 RX ADMIN — VASOPRESSIN 4.5 UNIT(S)/MIN: 20 INJECTION INTRAVENOUS at 08:22

## 2025-01-08 RX ADMIN — EPOETIN ALFA 10000 UNIT(S): 10000 SOLUTION INTRAVENOUS; SUBCUTANEOUS at 11:37

## 2025-01-08 RX ADMIN — AMIODARONE HYDROCHLORIDE 16.7 MG/MIN: 50 INJECTION, SOLUTION INTRAVENOUS at 19:39

## 2025-01-08 RX ADMIN — POLYETHYLENE GLYCOL 3350 17 GRAM(S): 17 POWDER, FOR SOLUTION ORAL at 17:25

## 2025-01-08 RX ADMIN — PROPOFOL 90 MILLIGRAM(S): 10 INJECTION, EMULSION INTRAVENOUS at 05:54

## 2025-01-08 RX ADMIN — Medication 1 TABLET(S): at 11:27

## 2025-01-08 NOTE — PROGRESS NOTE ADULT - ASSESSMENT
54M with PMH of HTN, HLD, ESRD on HD MWF via LUE AVF, ascites (requiring repeat paracenteses q4-6wks), NICM with moderate LV dysfunction w/ EF 35-40%() and CAD s/p atherectomy + SALLIE to D1(2024) presents to [facility] transferred from [facility]. Patient initially presented to [facility] ED with shortness of breath. Of note he was recently admitted from - for acute cholecystitis s/p cholecystectomy. In [facility] ED, pt was hypoxic to 86% on RA, trop 1.7K, EKG no new changes, CXR fluid overload. Admitted for AHRF 2/2 fluid overload. Pt received HD on , ,  and . DAPT was resumed, TTE showed improvement in LVEF to 40-45%. Stress test was abnormal with 1mm inferior STD, reversible ischemia in the inferior wall and fixed defects in the lateral, anterior and apical walls with post stress EF of 24%. Pt was then transferred to [facility] for cardiac cath which showed pLAD 70% ISR, mLCx 70%, D1 severe dz. Patient now transferred to [facility] CTS Dr. Rivers for CABG eval. Patient denies any current SOB or chest pain on admission. Otherwise denies headaches, abdominal pain, urinary or bowel changes, fevers, chills, N/V/D, sick contacts.  (24 Dec 2024 05:07)   VSS  CP free   HD via avf  for daily HD pre op- on lovenox 30 qd    HD today continue with present meds. Plan for CABG possibleTVR next week   vss; rsr 55-80; hd today w/ 1 unit prbc; pt to be dialzyed also this  w/ another 1 unit prbc   VSS; RSR 60-80; continue asa/bb/ statin; HD in am - transfuse 1 unit prbc with HD; d/c lovenox after am dose sun    - Pt underwent  C3L free LIMA-LAD; SVG-Diag; SVG-leftPL  Pt given cefuroxime perioperatively   pt extubated   . pt with hypotension and ECHO showing Systolic HF/depressed BIV Function, currently supported with /pressors.  Labs this evening showing transaminitis  1/2 -3 pt hypotensive, febrile and reintubated  Pt pancultured. and started on zosyn- meropenem and gent  pt found to have Gram negative bacteremia    E coli +ESBL bacteremia        A/P  #sepsis  ? source - many options. Pt with abnormal u/a but is a dialysis pt so hard to interpret. Pt with h//o SBP- pt with ascites   s/p  paracentesis 1/3  cultures No Growth to date  - no organisms notes on gram stain  could be from lungs but ? effusion vs infiltrate.  Continue meropenem  please reculture for fever  lines are being changed  if fevers persist , please obtain CT of head ( sinuses) chest and abdomen .Pt with recent cholecystectomy, pt with h/o SBP, pt with large secretions.   check c diff if develops diarrhea    #elevated LFTs  likely shock liver  acute  hepatitis serology- negative   trend  avoid hepatotoxic meds   still quite elevated but improving     #leukopenia  likely from sepsis  improved today    #thrombocytopenia   HIt ab negative  Likely from sepsis vs from IABP or drugs   trend         Marla Champion M.D. ,   please reach via teams   If no answer, or after 5PM/ weekends,  then please call  [phone]    Assessment and plan discussed with the primary team .   54M with PMH of HTN, HLD, ESRD on HD MWF via LUE AVF, ascites (requiring repeat paracenteses q4-6wks), NICM with moderate LV dysfunction w/ EF 35-40%() and CAD s/p atherectomy + SALLIE to D1(2024) presents to [facility] transferred from [facility]. Patient initially presented to [facility] ED with shortness of breath. Of note he was recently admitted from - for acute cholecystitis s/p cholecystectomy. In [facility] ED, pt was hypoxic to 86% on RA, trop 1.7K, EKG no new changes, CXR fluid overload. Admitted for AHRF 2/2 fluid overload. Pt received HD on , ,  and . DAPT was resumed, TTE showed improvement in LVEF to 40-45%. Stress test was abnormal with 1mm inferior STD, reversible ischemia in the inferior wall and fixed defects in the lateral, anterior and apical walls with post stress EF of 24%. Pt was then transferred to [facility] for cardiac cath which showed pLAD 70% ISR, mLCx 70%, D1 severe dz. Patient now transferred to [facility] CTS Dr. Rivers for CABG eval. Patient denies any current SOB or chest pain on admission. Otherwise denies headaches, abdominal pain, urinary or bowel changes, fevers, chills, N/V/D, sick contacts.  (24 Dec 2024 05:07)   VSS  CP free   HD via avf  for daily HD pre op- on lovenox 30 qd    HD today continue with present meds. Plan for CABG possibleTVR next week   vss; rsr 55-80; hd today w/ 1 unit prbc; pt to be dialzyed also this  w/ another 1 unit prbc   VSS; RSR 60-80; continue asa/bb/ statin; HD in am - transfuse 1 unit prbc with HD; d/c lovenox after am dose sun    - Pt underwent  C3L free LIMA-LAD; SVG-Diag; SVG-leftPL  Pt given cefuroxime perioperatively   pt extubated   . pt with hypotension and ECHO showing Systolic HF/depressed BIV Function, currently supported with /pressors.  Labs this evening showing transaminitis  1/2 -3 pt hypotensive, febrile and reintubated  Pt pancultured. and started on zosyn- meropenem and gent  pt found to have Gram negative bacteremia    E coli +ESBL bacteremia        A/P  #sepsis  ? source - many options. Pt with abnormal u/a but is a dialysis pt so hard to interpret. Pt with h//o SBP- pt with ascites   s/p  paracentesis 1/3  cultures No Growth to date  - no organisms notes on gram stain  could be from lungs but ? effusion vs infiltrate.  Continue meropenem  please reculture for fever  lines are being changed  if fevers persist/recurs , please obtain CT of head ( sinuses) chest and abdomen .Pt with recent cholecystectomy, pt with h/o SBP, pt with large secretions.   check c diff if develops diarrhea  may need to repeat paracentesis if continued leucocytosis     #elevated LFTs  likely shock liver  acute  hepatitis serology- negative   trend  avoid hepatotoxic meds   still quite elevated but improving daily    #leukopenia  likely from sepsis  improved  now leucocytosis     #thrombocytopenia   HIt ab negative  Likely from sepsis vs from IABP or drugs   trend   heme input if no improvement        Marla Champion M.D. ,   please reach via teams   If no answer, or after 5PM/ weekends,  then please call  [phone]    Assessment and plan discussed with the primary team .

## 2025-01-08 NOTE — PROGRESS NOTE ADULT - SUBJECTIVE AND OBJECTIVE BOX
Patient seen and examined at the bedside.    Remains critically ill on continuous ICU monitoring.      Brief Summary:  55 yo M with multiple medical problems including CAD s/p PCI in April 2024 s/p CABG x 3 on 12/30/24 1/2: Intubated for hypoxia & left lung white out s/p awake bronch with removal of copious mucopurulent secretions  1/4: s/p IABP insertion, sepsis/septic shock due to E coli     24 Hour events:  PS during the day, rested overnight  Alex weaned to 10ppm   CRRT hx of ESRD on HD   d/c IABP yesterday  Off Levophed  remains on   Continues on Amio for new onset AF - received one bolus in 24 hrs for rates int he 120s       Objective:  Vital Signs Last 24 Hrs  T(C): 36.8 (08 Jan 2025 04:00), Max: 38 (07 Jan 2025 12:00)  T(F): 98.2 (08 Jan 2025 04:00), Max: 100.4 (07 Jan 2025 12:00)  HR: 97 (08 Jan 2025 08:43) (80 - 107)  BP: --  BP(mean): --  RR: 28 (08 Jan 2025 07:45) (10 - 32)  SpO2: 100% (08 Jan 2025 08:43) (86% - 100%)    Parameters below as of 08 Jan 2025 05:47  Patient On (Oxygen Delivery Method): ventilator        Mode: CPAP with PS  FiO2: 40  PEEP: 8  PS: 10  MAP: 11  PIP: 19              Physical Exam:   General: Intubated  Neurology: moving all extremities to command, non-focal   Respiratory: Clear bilaterally   CV: Afib  Abdominal: Soft, Nontender  Extremities: Warm, well-perfused  -------------------------------------------------------------------------------------------------------------------------------    Labs:                        8.2    25.63 )-----------( 65       ( 08 Jan 2025 00:28 )             25.8     01-08    135  |  101  |  30[H]  ----------------------------<  133[H]  3.6   |  19[L]  |  2.00[H]    Ca    7.9[L]      08 Jan 2025 00:27  Phos  2.6     01-08  Mg     2.5     01-08    TPro  5.8[L]  /  Alb  2.9[L]  /  TBili  1.9[H]  /  DBili  x   /  AST  97[H]  /  ALT  240[H]  /  AlkPhos  106  01-08    LIVER FUNCTIONS - ( 08 Jan 2025 00:27 )  Alb: 2.9 g/dL / Pro: 5.8 g/dL / ALK PHOS: 106 U/L / ALT: 240 U/L / AST: 97 U/L / GGT: x           PT/INR - ( 08 Jan 2025 00:28 )   PT: 15.7 sec;   INR: 1.37 ratio         PTT - ( 08 Jan 2025 00:28 )  PTT:30.6 sec  ABG - ( 07 Jan 2025 22:12 )  pH, Arterial: 7.42  pH, Blood: x     /  pCO2: 32    /  pO2: 155   / HCO3: 21    / Base Excess: -3.2  /  SaO2: 99.1    ------------------------------------------------------------------------------------------------------------------------------  Assessment:  55 yo M s/p CABG x 3 on 12/30/24    Postop acute respiratory failure  Cardiogenic and Septic shock  Acute blood loss anemia  Transaminitis  Thrombocytopenia   ESRD on HD  E coli bacteremia 2/2 pneumonia  Leukocytosis     Plan:    ***Neuro***  RASS goal 0-1, Precedex PRN   Postoperative acute pain control with prn Fentanyl.     ***Cardiovascular***  s/p CABG x 3  d/c R. fem IABP 1/6  Wean Vasopressin for MAP > 65 mm Hg. Off Levophed  Remains on Dobutamine  Amiodarone infusion for a fib - transition to PO once extubated   Nitric oxide at 10 ppm - wean today   Holding ASA / Plavix in setting of thrombocytopenia   Colchicine for pericarditis   Vascular surgery consulted for fistula evaluation  1/5 TTE     1. Left ventricular systolic function is moderately decreased with an ejection fraction visually estimated at 40 %.   2. There is hypokinesis of the inferior and inferolateral walls.   3. Enlarged right ventricular cavity size and reduced right ventricular systolic function.    ***Pulmonary***  Postoperative acute respiratory failure  Daily SAT/SBT  and PS as tolerated.   Continue vent support.    ***GI***  Advance tube feeds.  Protonix for stress ulcer prophylaxis.   Bowel regimen.  Transaminitis - will trend, avoid hepatotoxins.  Paracentesis 1/3 - negative for SBP     ***Renal***  ESRD on CRRT - volume removal as tolerated.  Plan for Slightly negative today     ***ID***  Sepsis/septic shock due to ESBL E. coli - Continue meropenem  Repeat blood cultures 1/5 1/2 Bcx ESBL E coli   1/3 Bronch ESBL E Coli   Trend leukocytosis.  Surgical chest wall wound well appearing - no active infection     ***Endocrine***  Hyperglycemia- Insulin sliding scale    ***Hematology***  Acute blood loss anemia and thrombocytopenia.  No current transfusion indication.  Off Heparin    *** Lines ***  RIJ Intro - 12/30 - consider replacing today with Trialysis vs TLC  R rad afia - 12/30  R fem HD cath - 1/3       Care plan discussed with the ICU care team.   Patient remains critical, at risk for life threatening decompensation.    I have spent 30 minutes providing critical care management to this patient.    By signing my name below, I, Sonali Valle, attest that this documentation has been prepared under the direction and in the presence of Blaze Finch MD.  Electronically signed: Sonali Valle, 01-08-25 @ 08:50    I, Blaze Finch MD,  personally performed the services described in this documentation. all medical record entries made by the scribe were at my direction and in my presence. I have reviewed the chart and agree that the record reflects my personal performance and is accurate and complete  Electronically signed: Blaze Finch MD. Patient seen and examined at the bedside.    Remains critically ill on continuous ICU monitoring.      Brief Summary:  55 yo M with multiple medical problems including CAD s/p PCI in April 2024 s/p CABG x 3 on 12/30/24 1/2: Intubated for hypoxia & left lung white out s/p awake bronch with removal of copious mucopurulent secretions  1/4: s/p IABP insertion, sepsis/septic shock due to E coli     24 Hour events:  PS during the day, rested overnight  Weaned off Alex   CRRT hx of ESRD on HD   off vasopressors   remains on   Continues on Amio for new onset AF  Bronchoscopy overnight for increase secretions - high mucus burden in AUGUSTO and LLL      Objective:  Vital Signs Last 24 Hrs  T(C): 36.8 (08 Jan 2025 04:00), Max: 38 (07 Jan 2025 12:00)  T(F): 98.2 (08 Jan 2025 04:00), Max: 100.4 (07 Jan 2025 12:00)  HR: 97 (08 Jan 2025 08:43) (80 - 107)  BP: --  BP(mean): --  RR: 28 (08 Jan 2025 07:45) (10 - 32)  SpO2: 100% (08 Jan 2025 08:43) (86% - 100%)    Parameters below as of 08 Jan 2025 05:47  Patient On (Oxygen Delivery Method): ventilator        Mode: CPAP with PS  FiO2: 40  PEEP: 8  PS: 10  MAP: 11  PIP: 19              Physical Exam:   General: Intubated, awake, alert and responsive   Neurology: moving all extremities to command, non-focal   Respiratory: Clear bilaterally   CV: Afib  Abdominal: Soft, Nontender  Extremities: Warm, well-perfused  -------------------------------------------------------------------------------------------------------------------------------    Labs:                        8.2    25.63 )-----------( 65       ( 08 Jan 2025 00:28 )             25.8     01-08    135  |  101  |  30[H]  ----------------------------<  133[H]  3.6   |  19[L]  |  2.00[H]    Ca    7.9[L]      08 Jan 2025 00:27  Phos  2.6     01-08  Mg     2.5     01-08    TPro  5.8[L]  /  Alb  2.9[L]  /  TBili  1.9[H]  /  DBili  x   /  AST  97[H]  /  ALT  240[H]  /  AlkPhos  106  01-08    LIVER FUNCTIONS - ( 08 Jan 2025 00:27 )  Alb: 2.9 g/dL / Pro: 5.8 g/dL / ALK PHOS: 106 U/L / ALT: 240 U/L / AST: 97 U/L / GGT: x           PT/INR - ( 08 Jan 2025 00:28 )   PT: 15.7 sec;   INR: 1.37 ratio         PTT - ( 08 Jan 2025 00:28 )  PTT:30.6 sec  ABG - ( 07 Jan 2025 22:12 )  pH, Arterial: 7.42  pH, Blood: x     /  pCO2: 32    /  pO2: 155   / HCO3: 21    / Base Excess: -3.2  /  SaO2: 99.1    ------------------------------------------------------------------------------------------------------------------------------  Assessment:  55 yo M s/p CABG x 3 on 12/30/24    Postop acute respiratory failure  Cardiogenic and Septic shock  Acute blood loss anemia  Transaminitis  Ascites   Thrombocytopenia   ESRD on iHD requiring CRRT   E coli bacteremia 2/2 pneumonia  Leukocytosis     Plan:    ***Neuro***  RASS goal 0-1, Precedex PRN   Postoperative acute pain control with prn Fentanyl.     ***Cardiovascular***  s/p CABG x 3  d/c R. fem IABP 1/6  Off pressors, MAP > 65 mm Hg.   Remains on Dobutamine - wean as tolerated   Amiodarone infusion for a fib - transition to PO once extubated   Aelx weaned off 1/7  Holding ASA / Plavix in setting of thrombocytopenia   Colchicine for pericarditis   Vascular surgery consulted for fistula evaluation  1/5 TTE     1. Left ventricular systolic function is moderately decreased with an ejection fraction visually estimated at 40 %.   2. There is hypokinesis of the inferior and inferolateral walls.   3. Enlarged right ventricular cavity size and reduced right ventricular systolic function.    ***Pulmonary***  Postoperative acute respiratory failure  Daily SAT/SBT  and PS as tolerated.   Continue vent support   May need early trach placement iso heavy secretion burden/pneumonia     ***GI***  Tube feeds   Protonix for stress ulcer prophylaxis.   Bowel regimen.  Ascites: Paracentesis 1/3 - negative for SBP, will consider additional paracentesis with persistent fevers or reaccumulation with concern for intraabdominal HTN     ***Renal***  hx of ESRD iHD requiring CRRT - consider transitioning if remains off vasoactive medications.  LUE AV fistula   Plan for event to slightly negative today     ***ID***  Sepsis/septic shock due to ESBL E. coli - Continue meropenem, ongoing fevers   1/7 Bcx pending   1/5 Blood cultures negative to date  1/3 Bronch ESBL E Coli   1/2 Bcx ESBL E coli   Trend leukocytosis.  Surgical chest wall wound well appearing - no active infection     ***Endocrine***  Hyperglycemia- Insulin sliding scale    ***Hematology***  Acute blood loss anemia and thrombocytopenia.  No current transfusion indication.  PF4 negative 1/4  Off Heparin - consider restarting at low dose today given persistent AF     *** Lines ***  LIJ TLC - 1/7  R fem HD cath - 1/3   R rad afia - 12/30    Care plan discussed with the ICU care team.   Patient remains critical, at risk for life threatening decompensation.    I have spent 55 minutes providing critical care management to this patient.    By signing my name below, I, Sonali Valle, attest that this documentation has been prepared under the direction and in the presence of Blaze Finch MD.  Electronically signed: Sonali Chunlatosha, 01-08-25 @ 08:50    I, Blaze Finch MD,  personally performed the services described in this documentation. all medical record entries made by the scribe were at my direction and in my presence. I have reviewed the chart and agree that the record reflects my personal performance and is accurate and complete  Electronically signed: Blaze Finch MD.

## 2025-01-08 NOTE — PROGRESS NOTE ADULT - ASSESSMENT
54M with PMH of HTN, HLD, ESRD on HD MWF via LUE AVF, ascites (requiring repeat paracenteses q4-6wks), NICM with moderate LV dysfunction w/ EF 35-40%(2023) and CAD s/p atherectomy + SALLIE to D1(4/11/2024) presents to [facility] transferred from [facility] for CABG eval  Nephro on [facility] for HD    ESRD on HD   Regular schedule MWF   Access LUE AVF  Center [facility]  Nephrologist Dr. Bailey  Last HD on 12/24  S/p HD on 12/27 12/29, 12/30 for hyperkalemia and 12/31  2 L PUF On 01/02/2024  However hospital course now complicated by Cardiogenic shock now on multiple pressors,   CRRT initiated 01/03, patient hemodynamically unstable and continues to require CRRT  Keep MAP>65  Renal Diet  Consent in physical chart    Hyper/Hypokalemia  Monitor K, will change dialysate fluid as needed    HTN  currently on pressure support  monitor bp     CKD MBD  Can send a PTH  Ca and Phos daily    Anemia  CRISTIANE with HD  Transfuse if hgb <7    CAD with multivessel disease  s/p CABG 12/30  CTICU following

## 2025-01-08 NOTE — PROGRESS NOTE ADULT - ASSESSMENT
54M with PMH of HTN, HLD, ESRD on HD MWF via LUE AVF, ascites (requiring repeat paracenteses q4-6wks), NICM with moderate LV dysfunction w/ EF 35-40%() and CAD s/p atherectomy + SALLIE to D1(2024) presents to [facility] transferred from [facility]. Patient initially presented to [facility] ED with shortness of breath. Of note he was recently admitted from - for acute cholecystitis s/p cholecystectomy. In [facility] ED, pt was hypoxic to 86% on RA, trop 1.7K, EKG no new changes, CXR fluid overload. Admitted for AHRF 2/2 fluid overload. Pt received HD on , ,  and . DAPT was resumed, TTE showed improvement in LVEF to 40-45%. Stress test was abnormal with 1mm inferior STD, reversible ischemia in the inferior wall and fixed defects in the lateral, anterior and apical walls with post stress EF of 24%.     Pt was then transferred to [facility] for cardiac cath which showed pLAD 70% ISR, mLCx 70%, D1 severe dz.     Patient now transferred to [facility] CTS Dr. Rivers for CABG eval.      .  # CAD s/p NSTEMI  Hx CAD s/p PCI LAD   Presented with ADHF elevated troponins  Positive NST  -Cath- pLAD 70% ISR, mLCx 70%, D1 severe dz.   TTE EF 35% Severe TRWas on Plavix-p2y12- 321 been off Plavix since -POD#1-C3L free LIMA-LAD; SVG-Diag; SYK-[id number]-Crhwiuqwx on  Sinus rhythm,Amio for AF prophylaxis  -OOB off  Sinus rhythm   -POD#3-On /pressors-EF 25-30% RV failure with transaminitis-Sinus Rhythm  -POD#4-Was intubated for hypoxia-gradual hypotension on /pressors had IABP inserted this morning  -POD#5-s/p IABP insertion, sepsis/septic shock due to GNR/ECOLI HD cath placed   Bronched yesterday 1/3 - minimal secretions with rust-tinged sputum   Paracentesis for suspected bacterial peritonitis-No organisms noted  Transfused 1u pbrcs overnight  Remains on inotropes and pressors with IABP at 1:1  ESBL-E Coli bacteremia on meropenam      - POD#7 Atrial Fib ,remains intubated weaning IABP 1:3,off pressors on CRRT  -IABP removed.Remains on vent-Alex 10ppm,off Levo- CVP 10 / MAP 71 / Hct 25.6% / Lactate 1.7-Atrial Fibrillation  -Intubated on Alex 10ppm, gtt, BP better-AF~~90's on Amio-had bronch still febrile Tmax 101    # Acute on Chronic Systolic Heart Failure  EF-35% ,severe TR  Had HD post op  GDMT post op  LVEF improved post bypass but now at 23-30%-BiV dysfunction on  now off pressors  -TTE EF 40%,trace effusion  ECG c/w pericarditis-on colchicine     Vascular evaluated  AVGraft /?steal causing RV failure-'' Very low suspicion of steal Sd and AVF causing current clinical state. ''   VA Duplex Hemodialysis Access, Left (25 @ 13:35) >   Arterial flow volume of 1301 mL/m, and the venous flow volume of  1492 mL/m are consistent with a normally functioning dialysis access  fistula.      # Ascites  Hx chronic ascites  Has drainage q4-6 weeks  Last drained -4600mL  ? ascites related to severe TR  Had stsjkjqryusu-Fkifstk-ir growth   Monitor      # ESRD  Now on CRRT

## 2025-01-08 NOTE — PROGRESS NOTE ADULT - SUBJECTIVE AND OBJECTIVE BOX
MR#[id number]  PATIENT NAME:RAAD CANO    DATE OF SERVICE: 25 @ 06:48  Patient was seen and examined by Solo Quick MD on    25 @ 06:48 .  Interim events noted.Consultant notes ,Labs,Telemetry reviewed by me       HOSPITAL COURSE: HPI:  54M with PMH of HTN, HLD, ESRD on HD MWF via LUE AVF, ascites (requiring repeat paracenteses q4-6wks), NICM with moderate LV dysfunction w/ EF 35-40%() and CAD s/p atherectomy + SALLIE to D1(2024) presents to [facility] transferred from [facility]. Patient initially presented to [facility] ED with shortness of breath. Of note he was recently admitted from - for acute cholecystitis s/p cholecystectomy. In [facility] ED, pt was hypoxic to 86% on RA, trop 1.7K, EKG no new changes, CXR fluid overload. Admitted for AHRF 2/2 fluid overload. Pt received HD on , ,  and . DAPT was resumed, TTE showed improvement in LVEF to 40-45%. Stress test was abnormal with 1mm inferior STD, reversible ischemia in the inferior wall and fixed defects in the lateral, anterior and apical walls with post stress EF of 24%. Pt was then transferred to [facility] for cardiac cath which showed pLAD 70% ISR, mLCx 70%, D1 severe dz. Patient now transferred to [facility] CTS Dr. Rivers for CABG eval. Patient denies any current SOB or chest pain on admission. Otherwise denies headaches, abdominal pain, urinary or bowel changes, fevers, chills, N/V/D, sick contacts.  (24 Dec 2024 05:07)     Cardiac cath  pLAD 70% ISR, mLCx 70%, D1 severe dz.   -POD#1-C3L free LIMA-LAD; SVG-Diag; SVG-leftPL Awake OOB extubated Sinus rhythm on Dobutamine gtt  - Was intubated last night for airway protection s/p bronchoscopy This morning had IABP inserted  remains sedated intubated Sinus Rhythm  - s/p IABP insertion, sepsis/septic shock due to GNR/ECOLI -Paracentesis for suspected bacterial peritonitis  Transfused 1u pbrcs overnight  Remains on inoptropes and pressors with IABP at 1:1  -Intunated on IABP 1:1,Alex@20ppm,on ,weaning pressors  -POD#7-Remains intubated on CRRT-IABP 1:3,off pressors on Dobutrex in Atrial Fibrillation  -Intibated on Alex 10ppm,IABP dce'd,off Levophed,remains on Dobutrex-Atrial Fibrillation  -Remains intubated had bronch earlier for cupious secretions-On Dobutrex,AF~~90's On CRRT            MEDICATIONS  (STANDING):  albuterol/ipratropium for Nebulization 3 milliLiter(s) Nebulizer every 6 hours  aMIOdarone Infusion 0.5 mG/Min (16.7 mL/Hr) IV Continuous <Continuous>  aspirin  chewable 81 milliGRAM(s) Oral daily  bisacodyl Suppository 10 milliGRAM(s) Rectal once  chlorhexidine 0.12% Liquid 15 milliLiter(s) Oral Mucosa every 12 hours  chlorhexidine 2% Cloths 1 Application(s) Topical daily  chlorhexidine 4% Liquid 1 Application(s) Topical <User Schedule>  colchicine 0.3 milliGRAM(s) Oral daily  CRRT Treatment    <Continuous>  dexMEDEtomidine Infusion 0.5 MICROgram(s)/kG/Hr (10.6 mL/Hr) IV Continuous <Continuous>  dextrose 50% Injectable 50 milliLiter(s) IV Push every 15 minutes  dextrose 50% Injectable 25 milliLiter(s) IV Push every 15 minutes  DOBUTamine Infusion 5 MICROgram(s)/kG/Min (6.38 mL/Hr) IV Continuous <Continuous>  epoetin ignacia (EPOGEN) Injectable 91069 Unit(s) IV Push <User Schedule>  insulin lispro (ADMELOG) corrective regimen sliding scale   SubCutaneous every 6 hours  lidocaine   4% Patch 2 Patch Transdermal daily  meropenem  IVPB 1000 milliGRAM(s) IV Intermittent every 8 hours  Nephro-elicia 1 Tablet(s) Oral daily  pantoprazole  Injectable 40 milliGRAM(s) IV Push daily  Phoxillum Filtration BK 4 / 2.5 5000 milliLiter(s) (800 mL/Hr) CRRT <Continuous>  Phoxillum Filtration BK 4 / 2.5 5000 milliLiter(s) (200 mL/Hr) CRRT <Continuous>  polyethylene glycol 3350 17 Gram(s) Oral two times a day  PrismaSATE Dialysate BGK 4 / 2.5 5000 milliLiter(s) (1000 mL/Hr) CRRT <Continuous>  senna 2 Tablet(s) Oral at bedtime  sodium chloride 0.9%. 1000 milliLiter(s) (10 mL/Hr) IV Continuous <Continuous>  vasopressin Infusion 0.03 Unit(s)/Min (4.5 mL/Hr) IV Continuous <Continuous>    MEDICATIONS  (PRN):  fentaNYL    Injectable 12.5 MICROGram(s) IV Push every 2 hours PRN Moderate Pain (4 - 6)  fentaNYL    Injectable 25 MICROGram(s) IV Push every 3 hours PRN Severe Pain (7 - 10)  sodium chloride 0.9% lock flush 10 milliLiter(s) IV Push every 1 hour PRN Pre/post blood products, medications, blood draw, and to maintain line patency            REVIEW OF SYSTEMS:  Constitutional: [ ] fever, [ ]weight loss,  [ ]fatigue [ ]weight gain  Eyes: [ ] visual changes  Respiratory: [ ]shortness of breath;  [ ] cough, [ ]wheezing, [ ]chills, [ ]hemoptysis  Cardiovascular: [ ] chest pain, [ ]palpitations, [ ]dizziness,  [ ]leg swelling[ ]orthopnea[ ]PND  Gastrointestinal: [ ] abdominal pain, [ ]nausea, [ ]vomiting,  [ ]diarrhea [ ]Constipation [ ]Melena  Genitourinary: [ ] dysuria, [ ] hematuria [ ]Vigil  Neurologic: [ ] headaches [ ] tremors[ ]weakness [ ]Paralysis Right[ ] Left[ ]  Skin: [ ] itching, [ ]burning, [ ] rashes  Endocrine: [ ] heat or cold intolerance  Musculoskeletal: [ ] joint pain or swelling; [ ] muscle, back, or extremity pain  Psychiatric: [ ] depression, [ ]anxiety, [ ]mood swings, or [ ]difficulty sleeping  Hematologic: [ ] easy bruising, [ ] bleeding gums    [ ] All remaining systems negative except as per above.   [x ]Unable to obtain.  [x] No change in ROS since admission      Vital Signs Last 24 Hrs  T(C): 36.8 (2025 04:00), Max: 38.8 (2025 08:00)  T(F): 98.2 (2025 04:00), Max: 101.8 (2025 08:00)  HR: 96 (2025 06:15) (80 - 107)  BP: --143/47  RR: 27 (2025 06:15) (8 - 30)  SpO2: 100% (2025 06:15) (86% - 100%)    Parameters below as of 2025 05:47  Patient On (Oxygen Delivery Method): ventilator      I&O's Summary    2025 07:01  -  2025 07:00  --------------------------------------------------------  IN: 2190.5 mL / OUT: 2817 mL / NET: -626.5 mL    2025 07:01  -  2025 06:48  --------------------------------------------------------  IN: 1169.9 mL / OUT: 2539 mL / NET: -1369.1 mL        PHYSICAL EXAM:  General: No acute distress BMI-26.2  HEENT: EOMI, PERRL  Neck: Supple, [ ] JVD  Lungs: Fair  air entry bilaterally; [ ] rales [ ] wheezing [ ] rhonchi  Heart: Irregular rate and rhythm; [x ] murmur   2/6 [ x] systolic [ ] diastolic [ ] radiation[ ] rubs [ ]  gallops  Abdomen: Nontender, bowel sounds present  Extremities: No clubbing, cyanosis, [ ] edema [ ]Pulses  equal and intact  Nervous system: Sedated and intubated  Skin: No rashes or lesions    LABS:      135  |  101  |  30[H]  ----------------------------<  133[H]  3.6   |  19[L]  |  2.00[H]    Ca    7.9[L]      2025 00:27  Phos  2.6       Mg     2.5         TPro  5.8[L]  /  Alb  2.9[L]  /  TBili  1.9[H]  /  DBili  x   /  AST  97[H]  /  ALT  240[H]  /  AlkPhos  106  - AST  180[H]  /  ALT  377[H      Creatinine Trend: 2.00<--, 2.17<--, 2.45<--, 3.23<--, 3.81<--, 4.91<--                        8.2    25.63 )-----------( 65       ( 2025 00:28 )             25.8     PT/INR - ( 2025 00:28 )   PT: 15.7 sec;   INR: 1.37 ratio         PTT - ( 2025 00:28 )  PTT:30.6 sec      Culture - Blood (25 @ 19:50)   Specimen Source: .Blood BLOOD  Culture Results: -No growth at 48 Hours     12 Lead ECG (25 @ 00:21) >   ATRIAL FIBRILLATION  LEFT AXIS DEVIATION  NON-SPECIFIC INTRA-VENTRICULAR CONDUCTIONBLOCK  MINIMAL VOLTAGE CRITERIA FOR LVH, MAY BE NORMAL VARIANT ( Michael product )  INFERIOR INFARCT , AGE UNDETERMINED  ABNORMAL ECG        TTE W or WO Ultrasound Enhancing Agent (25 @ 09:59) >  CONCLUSIONS:      1. Left ventricular systolic function is moderately decreased with an ejection fraction visually estimated at 40 %.   2. There is hypokinesis of the inferior and inferolateral walls.   3. Enlarged right ventricular cavity size and reduced right ventricular systolic function.   4.Pericardium:-There ira trace pericardial effusion noted adjacent to the posterior left ventricle.

## 2025-01-08 NOTE — PROCEDURE NOTE - NSBRONCHPROCDETAILS_GEN_A_CORE_FT
Indication: Increased Secretions in intubated patient       History: S/P CABG with post op pneumonia         Findings:  Bronchoscope inserted through ETT. ETT noted to be in good position. Airway evaluation revealed Sharp Abena. AUGUSTO and LLL evaluation revealed white thick secretions. RUL, RML, RLL revealed minimal thin secretions. Bronchoscope then withdrawn from ETT. Minimal bleeding noted. Post Bronch xray ordered.

## 2025-01-08 NOTE — PROGRESS NOTE ADULT - SUBJECTIVE AND OBJECTIVE BOX
Patient seen and examined at the bedside.    Remained critically ill on continuous ICU monitoring.    OBJECTIVE:  Vital Signs Last 24 Hrs  T(C): 36.4 (08 Jan 2025 16:00), Max: 37.8 (07 Jan 2025 20:00)  T(F): 97.5 (08 Jan 2025 16:00), Max: 100 (07 Jan 2025 20:00)  HR: 96 (08 Jan 2025 19:30) (76 - 110)  BP: --  BP(mean): --  RR: 20 (08 Jan 2025 19:30) (12 - 33)  SpO2: 99% (08 Jan 2025 19:30) (90% - 100%)    Parameters below as of 08 Jan 2025 17:34  Patient On (Oxygen Delivery Method): nasal cannula, high flow          Physical Exam:   General: NAD   Neurology: Sedated  Eyes: bilateral pupils equal and reactive   ENT/Neck: Neck supple, trachea midline, No JVD   Respiratory: Clear bilaterally   CV: S1S2, no murmurs        [x] Sternal dressing         [x] Afib   Abdominal: Soft, NT, ND +BS   Extremities: 1-2+ pedal edema noted, + peripheral pulses   Skin: No Rashes, Hematoma, Ecchymosis                           Assessment:  54M with PMH of HTN, HLD, ESRD on HD MWF via LUE AVF, ascites (requiring repeat paracenteses q4-6wks), NICM with moderate LV dysfunction w/ EF 35-40%(2023) and CAD s/p atherectomy + SALLIE to D1(4/11/2024) presents to [facility] transferred from [facility].     s/p Cholecystectomy on 11/29/24  Multi-CAD s/p C3L on 12/30/24  Hypovolemia  Post op respiratory insufficiency  Acute blood loss anemia  Thrombocytopenia  Plan:   ***Neuro***  [x] Sedated with [x] Precedex   Post operative neuro assessment   PRNs for pain management     ***Cardiovascular***  Invasive hemodynamic monitoring, assess perfusion indices   AF / CVP 13 / MAP 64 / Hct 25.8% / Lactate 1.2  [x] Vasopressin - 0.03 Unit(s)/Min [x] Dobutamine - weaned off  Continuos reassessment of hemodynamics  Amiodarone for Afib prophylaxis   [x] AC therapy with Heparin gtt -- PTT goal is 40-50, Ordered PTT and f/u results   [x]  ASA  Serial EKG and cardiac enzymes     ***Pulmonary***  [x] Extubated to HFNC 40%/40L [x] BiPAP OVN  Post op vent management   Titration of FiO2 and PEEP, follow SpO2, CXR, blood gasses   Continue bronchodilators as needed.  Bronched in early AM, f/u results   Dr. Thomson planned to perform Trach, pt refused and was extubated instead      Mode: CPAP with PS  FiO2: 40  PEEP: 8  PS: 5  MAP: 8  PIP: 13              ***GI***  [x] NPO with TFs  [x] Protonix  Bowel regimen with Miralax and Senna.    ***Renal***  [x] ESRD on HD   Continue to monitor I/Os, BUN/Creatinine.   Replete lytes ORTEGAN  Yaritza present  Plan to stop CVVH tomorrow and transition to iHD  Nephro-Lisette for renal support    ***ID***  Empiric Meropenem  BCx q48hr    ***Endocrine***  [x]  DM2 : HbA1c 5.6%                - [x] ISS             - Need tight glycemic control to prevent wound infection.              Patient requires continuous monitoring with bedside rhythm monitoring, pulse oximetry monitoring, and continuous central venous and arterial pressure monitoring; and intermittent blood gas analysis. Care plan discussed with the ICU care team.   Patient remained critical, at risk for life threatening decompensation.    I have spent 65 minutes providing critical care management to this patient.    By signing my name below, I, Mel Castro, attest that this documentation has been prepared under the direction and in the presence of MYRA Romero  Electronically signed: Autumn Solitario, 01-08-25 @ 19:59    I, MYRA Romero, personally performed the services described in this documentation. all medical record entries made by the autumn were at my direction and in my presence. I have reviewed the chart and agree that the record reflects my personal performance and is accurate and complete  Electronically signed: MYRA Romero Patient seen and examined at the bedside.    Remained critically ill on continuous ICU monitoring.    Brief Summary:  53 yo M with multiple medical problems including CAD s/p PCI in April 2024 s/p CABG x 3 on 12/30/24  1/2: Intubated for hypoxia & left lung white out s/p awake bronch with removal of copious mucopurulent secretions  1/4: s/p IABP insertion, sepsis/septic shock due to E coli, epi weaned off  1/6-7 IABP removed and Jeff off  1/8 Extubated to [facility]      OBJECTIVE:  Vital Signs Last 24 Hrs  T(C): 36.4 (08 Jan 2025 16:00), Max: 37.8 (07 Jan 2025 20:00)  T(F): 97.5 (08 Jan 2025 16:00), Max: 100 (07 Jan 2025 20:00)  HR: 96 (08 Jan 2025 19:30) (76 - 110)  BP: --  BP(mean): --  RR: 20 (08 Jan 2025 19:30) (12 - 33)  SpO2: 99% (08 Jan 2025 19:30) (90% - 100%)    Parameters below as of 08 Jan 2025 17:34  Patient On (Oxygen Delivery Method): nasal cannula, high flow          Physical Exam:   General: NAD   Neurology: Alert/ oriented. follows commands, moves all extremities  Respiratory: Clear bilaterally   CV: Afib  Abdominal: Soft, Nontender  Extremities: Warm, well-perfused                   Assessment:  54M with PMH of HTN, HLD, ESRD on HD MWF via LUE AVF, ascites (requiring repeat paracenteses q4-6wks), NICM with moderate LV dysfunction w/ EF 35-40%(2023) and CAD s/p atherectomy + SALLIE to D1(4/11/2024) presents to [facility] transferred from [facility].     s/p Cholecystectomy on 11/29/24  Multi-CAD s/p C3L on 12/30/24  Hypovolemia  Post op respiratory insufficiency  Acute blood loss anemia  Thrombocytopenia  Plan:   ***Neuro***  PRNs for pain management     ***Cardiovascular***  s/p CABG x 3  d/c R. fem IABP 1/6  Off pressors, MAP > 65 mm Hg.   Amiodarone infusion for a fib - transition to PO once extubated   Jeff weaned off 1/7  Holding ASA / Plavix in setting of thrombocytopenia   Colchicine for pericarditis   Vascular surgery consulted for fistula evaluation  1/5 TTE     1. Left ventricular systolic function is moderately decreased with an ejection fraction visually estimated at 40 %.   2. There is hypokinesis of the inferior and inferolateral walls.   3. Enlarged right ventricular cavity size and reduced right ventricular systolic function.    Dobutamin restarted in setting of hypotension and lactate    ***Pulmonary***  Postoperative acute respiratory failure  Extubated, Chest PT, BiPAP support as tolerated        ***GI***  Tube feeds @ goal   Protonix for stress ulcer prophylaxis.   Bowel regimen.  Ascites: Paracentesis 1/3 - negative for SBP, will consider additional paracentesis with persistent fevers or reaccumulation with concern for intraabdominal HTN     ***Renal***  hx of ESRD iHD requiring CRRT - consider transitioning if remains off vasoactive medications.  LUE AV fistula   Plan for even to slightly negative today     ***ID***  Sepsis/septic shock due to ESBL E. coli - Continue meropenem, ongoing fevers   1/7 Bcx pending   1/5 Blood cultures negative to date  1/3 Bronch ESBL E Coli   1/2 Bcx ESBL E coli   Trend leukocytosis.  Surgical chest wall wound well appearing - no active infection     ***Endocrine***  Hyperglycemia- Insulin sliding scale    ***Hematology***  Acute blood loss anemia and thrombocytopenia.  No current transfusion indication.  PF4 negative 1/4  Heparin started in setting of Afib with PTT goal (40-50)     *** Lines ***  [facility] TLC - 1/7  R fem HD cath - 1/3   R rad afia - 12/30          Patient requires continuous monitoring with bedside rhythm monitoring, pulse oximetry monitoring, and continuous central venous and arterial pressure monitoring; and intermittent blood gas analysis. Care plan discussed with the ICU care team.   Patient remained critical, at risk for life threatening decompensation.    I have spent 65 minutes providing critical care management to this patient.    By signing my name below, IMel, attest that this documentation has been prepared under the direction and in the presence of MYRA Romero  Electronically signed: Evelio Solitario, 01-08-25 @ 19:59    ILouis PA, personally performed the services described in this documentation. all medical record entries made by the scribe were at my direction and in my presence. I have reviewed the chart and agree that the record reflects my personal performance and is accurate and complete  Electronically signed: MYRA Romero

## 2025-01-08 NOTE — CONSULT NOTE ADULT - SUBJECTIVE AND OBJECTIVE BOX
General Surgery Consult  Consulting surgical team: [facility]   Consulting attending: Berto   Patient seen and examined: 01-08-25 @ 12:51    HPI:  54M with PMH of HTN, HLD, ESRD on HD MWF via LUE AVF, ascites (requiring repeat paracenteses q4-6wks), NICM with moderate LV dysfunction w/ EF 35-40%(2023) and CAD s/p atherectomy + SALLIE to D1(4/11/2024) presents to [facility] transferred from [facility]. Patient now s/p CABG on 12/30/24. Patient was extubated postop, but had to be reintubated due to hypoxia and hypercarbia. Surgery consulted today for tracheostomy creation given high concern for failure to extubate.       PAST MEDICAL HISTORY:  Type 2 diabetes mellitus    Hypertension    HLD (hyperlipidemia)    End stage renal disease    Bone spur    Anemia    Injury of right wrist    History of hyperkalemia    No pertinent past medical history        PAST SURGICAL HISTORY:  S/P arthroscopy of right shoulder    History of vascular access device    H/O right wrist surgery    AV fistula    H/O ventral hernia repair    S/P cholecystectomy        ALLERGIES:  No Known Allergies      MEDICATIONS:  albuterol/ipratropium for Nebulization 3 milliLiter(s) Nebulizer every 6 hours  aMIOdarone Infusion 0.5 mG/Min IV Continuous <Continuous>  aspirin  chewable 81 milliGRAM(s) Oral daily  bisacodyl Suppository 10 milliGRAM(s) Rectal once  chlorhexidine 0.12% Liquid 15 milliLiter(s) Oral Mucosa every 12 hours  chlorhexidine 2% Cloths 1 Application(s) Topical daily  chlorhexidine 4% Liquid 1 Application(s) Topical <User Schedule>  colchicine 0.3 milliGRAM(s) Oral daily  CRRT Treatment    <Continuous>  dexMEDEtomidine Infusion 0.5 MICROgram(s)/kG/Hr IV Continuous <Continuous>  dextrose 50% Injectable 50 milliLiter(s) IV Push every 15 minutes  dextrose 50% Injectable 25 milliLiter(s) IV Push every 15 minutes  DOBUTamine Infusion 2.5 MICROgram(s)/kG/Min IV Continuous <Continuous>  epoetin ignacia (EPOGEN) Injectable 32515 Unit(s) IV Push <User Schedule>  fentaNYL    Injectable 12.5 MICROGram(s) IV Push every 2 hours PRN  fentaNYL    Injectable 25 MICROGram(s) IV Push every 3 hours PRN  insulin lispro (ADMELOG) corrective regimen sliding scale   SubCutaneous every 6 hours  lidocaine   4% Patch 2 Patch Transdermal daily  meropenem  IVPB 1000 milliGRAM(s) IV Intermittent every 8 hours  Nephro-elicia 1 Tablet(s) Oral daily  pantoprazole  Injectable 40 milliGRAM(s) IV Push daily  Phoxillum Filtration BK 4 / 2.5 5000 milliLiter(s) CRRT <Continuous>  Phoxillum Filtration BK 4 / 2.5 5000 milliLiter(s) CRRT <Continuous>  polyethylene glycol 3350 17 Gram(s) Oral two times a day  PrismaSATE Dialysate BGK 4 / 2.5 5000 milliLiter(s) CRRT <Continuous>  senna 2 Tablet(s) Oral at bedtime  sodium chloride 0.9% lock flush 10 milliLiter(s) IV Push every 1 hour PRN  sodium chloride 0.9%. 1000 milliLiter(s) IV Continuous <Continuous>  vasopressin Infusion 0.03 Unit(s)/Min IV Continuous <Continuous>      VITALS & I/Os:  Vital Signs Last 24 Hrs  T(C): 36.5 (08 Jan 2025 08:00), Max: 37.8 (07 Jan 2025 20:00)  T(F): 97.7 (08 Jan 2025 08:00), Max: 100 (07 Jan 2025 20:00)  HR: 88 (08 Jan 2025 12:45) (76 - 107)  BP: --  BP(mean): --  RR: 19 (08 Jan 2025 12:45) (11 - 33)  SpO2: 99% (08 Jan 2025 12:45) (86% - 100%)    Parameters below as of 08 Jan 2025 12:00  Patient On (Oxygen Delivery Method): ventilator    O2 Concentration (%): 40  Mode: CPAP with PS  FiO2: 40  PEEP: 8  PS: 10  MAP: 11  PIP: 19    I&O's Summary    07 Jan 2025 07:01  -  08 Jan 2025 07:00  --------------------------------------------------------  IN: 1512.3 mL / OUT: 2799 mL / NET: -1286.7 mL    08 Jan 2025 07:01  -  08 Jan 2025 12:51  --------------------------------------------------------  IN: 527.7 mL / OUT: 1248 mL / NET: -720.3 mL        PHYSICAL EXAM:  General: No acute distress  Respiratory: intubated, trachea midline, no scars  Chest: dressing strikethrough   Cardiovascular: appears well perfused   Extremities: Appears warm and well perfused    LABS:                        8.2    25.63 )-----------( 65       ( 08 Jan 2025 00:28 )             25.8     01-08    135  |  101  |  30[H]  ----------------------------<  133[H]  3.6   |  19[L]  |  2.00[H]    Ca    7.9[L]      08 Jan 2025 00:27  Phos  2.6     01-08  Mg     2.5     01-08    TPro  5.8[L]  /  Alb  2.9[L]  /  TBili  1.9[H]  /  DBili  x   /  AST  97[H]  /  ALT  240[H]  /  AlkPhos  106  01-08    Lactate:  01-08 @ 09:45  1.1  01-07 @ 22:12  1.1  01-07 @ 19:46  1.2    PT/INR - ( 08 Jan 2025 00:28 )   PT: 15.7 sec;   INR: 1.37 ratio         PTT - ( 08 Jan 2025 00:28 )  PTT:30.6 sec  ABG - ( 08 Jan 2025 09:45 )  pH, Arterial: 7.43  pH, Blood: x     /  pCO2: 33    /  pO2: 141   / HCO3: 22    / Base Excess: -2.0  /  SaO2: 100.0                   Urinalysis Basic - ( 08 Jan 2025 00:27 )    Color: x / Appearance: x / SG: x / pH: x  Gluc: 133 mg/dL / Ketone: x  / Bili: x / Urobili: x   Blood: x / Protein: x / Nitrite: x   Leuk Esterase: x / RBC: x / WBC x   Sq Epi: x / Non Sq Epi: x / Bacteria: x        IMAGING:

## 2025-01-08 NOTE — PROGRESS NOTE ADULT - SUBJECTIVE AND OBJECTIVE BOX
[facility]    Dr. Nica Styles     Office [phone] (9 am to 5 pm)  Service: [phone] (5pm to 9am)  Also Available on TEAMS      RENAL PROGRESS NOTE: DATE OF SERVICE 01-08-25 @ 11:44    Patient is a 54y old  Male who presents with a chief complaint of CAD s/p CABG (08 Jan 2025 06:48)      Patient seen and examined at bedside. No chest pain/sob    VITALS:  T(F): 97.7 (01-08-25 @ 08:00), Max: 100.4 (01-07-25 @ 12:00)  HR: 85 (01-08-25 @ 11:45)  BP: --  RR: 18 (01-08-25 @ 11:45)  SpO2: 100% (01-08-25 @ 11:45)  Wt(kg): --    01-07 @ 07:01  -  01-08 @ 07:00  --------------------------------------------------------  IN: 1512.3 mL / OUT: 2799 mL / NET: -1286.7 mL    01-08 @ 07:01  -  01-08 @ 11:44  --------------------------------------------------------  IN: 392.8 mL / OUT: 1084 mL / NET: -691.2 mL          PHYSICAL EXAM:  Constitutional: NAD  Neck: No JVD  Respiratory: CTAB, no wheezes, rales or rhonchi  Cardiovascular: S1, S2, RRR  Gastrointestinal: BS+, soft, NT/ND  Extremities: No peripheral edema    Hospital Medications:   MEDICATIONS  (STANDING):  albuterol/ipratropium for Nebulization 3 milliLiter(s) Nebulizer every 6 hours  aMIOdarone Infusion 0.5 mG/Min (16.7 mL/Hr) IV Continuous <Continuous>  aspirin  chewable 81 milliGRAM(s) Oral daily  bisacodyl Suppository 10 milliGRAM(s) Rectal once  chlorhexidine 0.12% Liquid 15 milliLiter(s) Oral Mucosa every 12 hours  chlorhexidine 2% Cloths 1 Application(s) Topical daily  chlorhexidine 4% Liquid 1 Application(s) Topical <User Schedule>  colchicine 0.3 milliGRAM(s) Oral daily  CRRT Treatment    <Continuous>  dexMEDEtomidine Infusion 0.5 MICROgram(s)/kG/Hr (10.6 mL/Hr) IV Continuous <Continuous>  dextrose 50% Injectable 50 milliLiter(s) IV Push every 15 minutes  dextrose 50% Injectable 25 milliLiter(s) IV Push every 15 minutes  DOBUTamine Infusion 2.5 MICROgram(s)/kG/Min (3.19 mL/Hr) IV Continuous <Continuous>  epoetin ignacia (EPOGEN) Injectable 27402 Unit(s) IV Push <User Schedule>  insulin lispro (ADMELOG) corrective regimen sliding scale   SubCutaneous every 6 hours  lidocaine   4% Patch 2 Patch Transdermal daily  meropenem  IVPB 1000 milliGRAM(s) IV Intermittent every 8 hours  Nephro-elicia 1 Tablet(s) Oral daily  pantoprazole  Injectable 40 milliGRAM(s) IV Push daily  Phoxillum Filtration BK 4 / 2.5 5000 milliLiter(s) (800 mL/Hr) CRRT <Continuous>  Phoxillum Filtration BK 4 / 2.5 5000 milliLiter(s) (200 mL/Hr) CRRT <Continuous>  polyethylene glycol 3350 17 Gram(s) Oral two times a day  PrismaSATE Dialysate BGK 4 / 2.5 5000 milliLiter(s) (1000 mL/Hr) CRRT <Continuous>  senna 2 Tablet(s) Oral at bedtime  sodium chloride 0.9%. 1000 milliLiter(s) (10 mL/Hr) IV Continuous <Continuous>  vasopressin Infusion 0.03 Unit(s)/Min (4.5 mL/Hr) IV Continuous <Continuous>      LABS:  01-08    135  |  101  |  30[H]  ----------------------------<  133[H]  3.6   |  19[L]  |  2.00[H]    Ca    7.9[L]      08 Jan 2025 00:27  Phos  2.6     01-08  Mg     2.5     01-08    TPro  5.8[L]  /  Alb  2.9[L]  /  TBili  1.9[H]  /  DBili      /  AST  97[H]  /  ALT  240[H]  /  AlkPhos  106  01-08    Creatinine Trend: 2.00 <--, 2.17 <--, 2.45 <--, 3.23 <--, 3.81 <--, 4.91 <--, 5.93 <--, 6.32 <--, 6.21 <--, 5.03 <--, 4.67 <--    Albumin: 2.9 g/dL (01-08 @ 00:27)  Phosphorus: 2.6 mg/dL (01-08 @ 00:27)                              8.2    25.63 )-----------( 65       ( 08 Jan 2025 00:28 )             25.8     Urine Studies:  Urinalysis - [01-08-25 @ 00:27]      Color  / Appearance  / SG  / pH       Gluc 133 / Ketone   / Bili  / Urobili        Blood  / Protein  / Leuk Est  / Nitrite       RBC  / WBC  / Hyaline  / Gran  / Sq Epi  / Non Sq Epi  / Bacteria       Iron 29, TIBC 143, %sat 20      [12-19-24 @ 05:00]  Ferritin 904      [12-19-24 @ 05:00]  TSH 4.75      [12-24-24 @ 06:50]    HBsAg Nonreact      [12-19-24 @ 05:00]      RADIOLOGY & ADDITIONAL STUDIES:

## 2025-01-08 NOTE — CONSULT NOTE ADULT - SUBJECTIVE AND OBJECTIVE BOX
Wound SURGERY CONSULT NOTE    HPI:  54M with PMH of HTN, HLD, ESRD on HD MWF via LUE AVF, ascites (requiring repeat paracenteses q4-6wks), NICM with moderate LV dysfunction w/ EF 35-40%(2023) and CAD s/p atherectomy + SALLIE to D1(4/11/2024) presents to [facility] transferred from [facility]. Patient initially presented to [facility] beds at the [facility] ED with shortness of breath. Of note he was recently admitted from 09/27-12/02 for acute cholecystitis s/p cholecystectomy. In [facility] beds at the [facility] ED, pt was hypoxic to 86% on RA, trop 1.7K, EKG no new changes, CXR fluid overload. Admitted for AHRF 2/2 fluid overload. Pt received HD on 12/18, 12/19, 12/20 and 12/22. DAPT was resumed, TTE showed improvement in LVEF to 40-45%. Stress test was abnormal with 1mm inferior STD, reversible ischemia in the inferior wall and fixed defects in the lateral, anterior and apical walls with post stress EF of 24%. Pt was then transferred to [facility] for cardiac cath which showed pLAD 70% ISR, mLCx 70%, D1 severe dz. Patient now transferred to [facility] CTS Dr. Rivers for CABG eval. Patient denies any current SOB or chest pain on admission. Otherwise denies headaches, abdominal pain, urinary or bowel changes, fevers, chills, N/V/D, sick contacts.  (24 Dec 2024 05:07)      Wound consult requested by team to assist w/ management of sacral, BL buttocks pressure injury.   Pt w/ c/o pain and w/o c/o drainage, odor, color change,  or worsening swelling. Offloading and pericare initiated upon admission as pt Increasingly sedentary 2/2 to illness. Pt is Incontinent of urine & stool. (+)blunt, intubated.    Current Diet: Diet, NPO with Tube Feed:   Tube Feeding Modality: Nasogastric  Vital 1.5 Jeremias (VITAL1.5RTH)  Total Volume for 24 Hours (mL): 1440  Continuous  Starting Tube Feed Rate mL per Hour: 20  Increase Tube Feed Rate by (mL): 10  Until Goal Tube Feed Rate (mL per Hour): 60  Tube Feed Duration (in Hours): 24  Tube Feed Start Time: 13:00  No Carb Prosource TF     Qty per Day:  1 (01-07-25 @ 23:29)      PAST MEDICAL & SURGICAL HISTORY:  Type 2 diabetes mellitus      Hypertension      End stage renal disease  started HD 2/2019 T, Th, Sat via right chest permacath      Bone spur  right shoulder- hx of - sx done      Anemia      Injury of right wrist  hx of at age 14      History of hyperkalemia  before HD- K-6.2 - to repeat in am of sx, pt had HD today      S/P arthroscopy of right shoulder  2010      History of vascular access device  right chest permacath - 2/2019      H/O right wrist surgery  tendons repair - at age 14      AV fistula      H/O ventral hernia repair      S/P cholecystectomy          REVIEW OF SYSTEMS: Pt unable to offer      MEDICATIONS  (STANDING):  albuterol/ipratropium for Nebulization 3 milliLiter(s) Nebulizer every 6 hours  aMIOdarone Infusion 0.5 mG/Min (16.7 mL/Hr) IV Continuous <Continuous>  aspirin  chewable 81 milliGRAM(s) Oral daily  bisacodyl Suppository 10 milliGRAM(s) Rectal once  chlorhexidine 0.12% Liquid 15 milliLiter(s) Oral Mucosa every 12 hours  chlorhexidine 2% Cloths 1 Application(s) Topical daily  chlorhexidine 4% Liquid 1 Application(s) Topical <User Schedule>  colchicine 0.3 milliGRAM(s) Oral daily  CRRT Treatment    <Continuous>  dexMEDEtomidine Infusion 0.5 MICROgram(s)/kG/Hr (10.6 mL/Hr) IV Continuous <Continuous>  dextrose 50% Injectable 50 milliLiter(s) IV Push every 15 minutes  dextrose 50% Injectable 25 milliLiter(s) IV Push every 15 minutes  DOBUTamine Infusion 2.5 MICROgram(s)/kG/Min (3.19 mL/Hr) IV Continuous <Continuous>  epoetin ignacia (EPOGEN) Injectable 24493 Unit(s) IV Push <User Schedule>  insulin lispro (ADMELOG) corrective regimen sliding scale   SubCutaneous every 6 hours  lidocaine   4% Patch 2 Patch Transdermal daily  meropenem  IVPB 1000 milliGRAM(s) IV Intermittent every 8 hours  Nephro-elicia 1 Tablet(s) Oral daily  pantoprazole  Injectable 40 milliGRAM(s) IV Push daily  Phoxillum Filtration BK 4 / 2.5 5000 milliLiter(s) (800 mL/Hr) CRRT <Continuous>  Phoxillum Filtration BK 4 / 2.5 5000 milliLiter(s) (200 mL/Hr) CRRT <Continuous>  polyethylene glycol 3350 17 Gram(s) Oral two times a day  PrismaSATE Dialysate BGK 4 / 2.5 5000 milliLiter(s) (1000 mL/Hr) CRRT <Continuous>  senna 2 Tablet(s) Oral at bedtime  sodium chloride 0.9%. 1000 milliLiter(s) (10 mL/Hr) IV Continuous <Continuous>  vasopressin Infusion 0.03 Unit(s)/Min (4.5 mL/Hr) IV Continuous <Continuous>    MEDICATIONS  (PRN):  fentaNYL    Injectable 12.5 MICROGram(s) IV Push every 2 hours PRN Moderate Pain (4 - 6)  fentaNYL    Injectable 25 MICROGram(s) IV Push every 3 hours PRN Severe Pain (7 - 10)  sodium chloride 0.9% lock flush 10 milliLiter(s) IV Push every 1 hour PRN Pre/post blood products, medications, blood draw, and to maintain line patency      Allergies    No Known Allergies    Intolerances        SOCIAL HISTORY:    Former smoker, ETOH, no hx drugs    FAMILY HISTORY:  FH: hypertension  mother, father- alive    FH: type 2 diabetes  mother, father- alive    PHYSICAL EXAM:  Vital Signs Last 24 Hrs  T(C): 36.5 (08 Jan 2025 08:00), Max: 37.8 (07 Jan 2025 20:00)  T(F): 97.7 (08 Jan 2025 08:00), Max: 100 (07 Jan 2025 20:00)  HR: 92 (08 Jan 2025 12:15) (76 - 107)  BP: --  BP(mean): --  RR: 20 (08 Jan 2025 12:15) (10 - 33)  SpO2: 96% (08 Jan 2025 12:15) (86% - 100%)    Parameters below as of 08 Jan 2025 12:00  Patient On (Oxygen Delivery Method): ventilator    O2 Concentration (%): 40    NAD/ Alert/ intubated/ thin  WD/ WN/Progressa bed  HEENT:  sclera clear, mucosa moist, throat clear, trachea midline, neck supple  Respiratory: nonlabored w/ equal chest rise  Gastrointestinal: soft NT/ND, (+)NGT  : Deferred   Neurology: nonverbal, can understand commands  Psych: difficult to assess  Musculoskeletal:  no deformities/ contractures  Vascular: BLE equally warm, no cyanosis, clubbing, edema nor acute ischemia  Skin:  moist w/ good turgor   sacral region, BL buttocks deep tissue injury      10.5 cm x 6.0 cm x 0.1 cm. periwound intact, without erythema        scant serosanguinous drainage, there is no increase in warmth         no induration, fluctuance, nor crepidus     Right Upper Back; deep tissue injury 7.5 cm x 4.5 cm x 0.0 cm.       There is no blistering, drainage, no odor, erythema, no increase in warmth           no induration, fluctuance, nor crepitus    LABS/ CULTURES/ RADIOLOGY:                        8.2    25.63 )-----------( 65       ( 08 Jan 2025 00:28 )             25.8       135  |  101  |  30  ----------------------------<  133      [01-08-25 @ 00:27]  3.6   |  19  |  2.00        Ca     7.9     [01-08-25 @ 00:27]      Mg     2.5     [01-08-25 @ 00:27]      Phos  2.6     [01-08-25 @ 00:27]    TPro  5.8  /  Alb  2.9  /  TBili  1.9  /  DBili  x   /  AST  97  /  ALT  240  /  AlkPhos  106  [01-08-25 @ 00:27]    PT/INR: PT 15.7 , INR 1.37       [01-08-25 @ 00:28]  PTT: 30.6       [01-08-25 @ 00:28]        A1C with Estimated Average Glucose Result: 5.6 % (12-24-24 @ 06:50)        Culture - Blood (collected 01-05-25 @ 19:50)  Source: .Blood BLOOD  Preliminary Report (01-07-25 @ 23:01):    No growth at 48 Hours    Culture - Blood (collected 01-05-25 @ 18:20)  Source: .Blood BLOOD  Preliminary Report (01-07-25 @ 22:01):    No growth at 48 Hours    Culture - Blood (collected 01-02-25 @ 20:45)  Source: .Blood BLOOD  Gram Stain (01-03-25 @ 08:12):    Growth in aerobic bottle: Gram Negative Rods    Growth in anaerobic bottle: Gram Negative Rods  Final Report (01-04-25 @ 16:15):    Growth in aerobic and anaerobic bottles: Escherichia coli ESBL  Organism: Escherichia coli ESBL (01-04-25 @ 16:15)  Organism: Escherichia coli ESBL (01-04-25 @ 16:15)      Method Type: LISA      -  Ampicillin: R >16 These ampicillin results predict results for amoxicillin      -  Ampicillin/Sulbactam: R 16/8      -  Aztreonam: R >16      -  Cefazolin: R >16      -  Cefepime: R >16      -  Ceftriaxone: R >32      -  Ciprofloxacin: R >2      -  Ertapenem: S <=0.5      -  Gentamicin: S <=2      -  Imipenem: S <=1      -  Levofloxacin: R >4      -  Meropenem: S <=1      -  Piperacillin/Tazobactam: R <=8      -  Tobramycin: S <=2      -  Trimethoprim/Sulfamethoxazole: R >2/38    Culture - Blood (collected 01-02-25 @ 20:30)  Source: .Blood BLOOD  Gram Stain (01-03-25 @ 08:11):    Growth in aerobic bottle: Gram Negative Rods    Growth in anaerobic bottle: Gram Negative Rods  Final Report (01-04-25 @ 16:16):    Growth in aerobic and anaerobic bottles: Escherichia coli ESBL    See previous culture [id number]    Direct identification is available within approximately 3-5    hours either by Blood Panel Multiplexed PCR or Direct    MALDI-TOF. Details: [email or web address]  Organism: Blood Culture PCR (01-04-25 @ 16:16)  Organism: Blood Culture PCR (01-04-25 @ 16:16)      Method Type: PCR      -  Escherichia coli: Detec      -  ESBL: Detec      -  CTX-M Resistance Marker: Detec                           Wound SURGERY CONSULT NOTE    HPI:  54M with PMH of HTN, HLD, ESRD on HD MWF via LUE AVF, ascites (requiring repeat paracenteses q4-6wks), NICM with moderate LV dysfunction w/ EF 35-40%(2023) and CAD s/p atherectomy + SALLIE to D1(4/11/2024) presents to [facility] transferred from [facility]. Patient initially presented to [facility] ED with shortness of breath. Of note he was recently admitted from 09/27-12/02 for acute cholecystitis s/p cholecystectomy. In [facility] ED, pt was hypoxic to 86% on RA, trop 1.7K, EKG no new changes, CXR fluid overload. Admitted for AHRF 2/2 fluid overload. Pt received HD on 12/18, 12/19, 12/20 and 12/22. DAPT was resumed, TTE showed improvement in LVEF to 40-45%. Stress test was abnormal with 1mm inferior STD, reversible ischemia in the inferior wall and fixed defects in the lateral, anterior and apical walls with post stress EF of 24%. Pt was then transferred to [facility] for cardiac cath which showed pLAD 70% ISR, mLCx 70%, D1 severe dz. Patient now transferred to [facility] CTS Dr. Rivers for CABG eval. Patient denies any current SOB or chest pain on admission. Otherwise denies headaches, abdominal pain, urinary or bowel changes, fevers, chills, N/V/D, sick contacts.  (24 Dec 2024 05:07)      Wound consult requested by team to assist w/ management of sacral, BL buttocks pressure injury.   Pt w/ c/o pain and w/o c/o drainage, odor, color change,  or worsening swelling. Offloading and pericare initiated upon admission as pt Increasingly sedentary 2/2 to illness. Pt is Incontinent of urine & stool. (+) Vigil intubated.    Current Diet: Diet, NPO with Tube Feed:   Tube Feeding Modality: Nasogastric  Vital 1.5 Jeremias (VITAL1.5RTH)  Total Volume for 24 Hours (mL): 1440  Continuous  Starting Tube Feed Rate mL per Hour: 20  Increase Tube Feed Rate by (mL): 10  Until Goal Tube Feed Rate (mL per Hour): 60  Tube Feed Duration (in Hours): 24  Tube Feed Start Time: 13:00  No Carb Prosource TF     Qty per Day:  1 (01-07-25 @ 23:29)      PAST MEDICAL & SURGICAL HISTORY:  Type 2 diabetes mellitus      Hypertension      End stage renal disease  started HD 2/2019 T, Th, Sat via right chest permacath      Bone spur  right shoulder- hx of - sx done      Anemia      Injury of right wrist  hx of at age 14      History of hyperkalemia  before HD- K-6.2 - to repeat in am of sx, pt had HD today      S/P arthroscopy of right shoulder  2010      History of vascular access device  right chest permacath - 2/2019      H/O right wrist surgery  tendons repair - at age 14      AV fistula      H/O ventral hernia repair      S/P cholecystectomy          REVIEW OF SYSTEMS: Pt unable to offer      MEDICATIONS  (STANDING):  albuterol/ipratropium for Nebulization 3 milliLiter(s) Nebulizer every 6 hours  aMIOdarone Infusion 0.5 mG/Min (16.7 mL/Hr) IV Continuous <Continuous>  aspirin  chewable 81 milliGRAM(s) Oral daily  bisacodyl Suppository 10 milliGRAM(s) Rectal once  chlorhexidine 0.12% Liquid 15 milliLiter(s) Oral Mucosa every 12 hours  chlorhexidine 2% Cloths 1 Application(s) Topical daily  chlorhexidine 4% Liquid 1 Application(s) Topical <User Schedule>  colchicine 0.3 milliGRAM(s) Oral daily  CRRT Treatment    <Continuous>  dexMEDEtomidine Infusion 0.5 MICROgram(s)/kG/Hr (10.6 mL/Hr) IV Continuous <Continuous>  dextrose 50% Injectable 50 milliLiter(s) IV Push every 15 minutes  dextrose 50% Injectable 25 milliLiter(s) IV Push every 15 minutes  DOBUTamine Infusion 2.5 MICROgram(s)/kG/Min (3.19 mL/Hr) IV Continuous <Continuous>  epoetin ignacia (EPOGEN) Injectable 07762 Unit(s) IV Push <User Schedule>  insulin lispro (ADMELOG) corrective regimen sliding scale   SubCutaneous every 6 hours  lidocaine   4% Patch 2 Patch Transdermal daily  meropenem  IVPB 1000 milliGRAM(s) IV Intermittent every 8 hours  Nephro-elicia 1 Tablet(s) Oral daily  pantoprazole  Injectable 40 milliGRAM(s) IV Push daily  Phoxillum Filtration BK 4 / 2.5 5000 milliLiter(s) (800 mL/Hr) CRRT <Continuous>  Phoxillum Filtration BK 4 / 2.5 5000 milliLiter(s) (200 mL/Hr) CRRT <Continuous>  polyethylene glycol 3350 17 Gram(s) Oral two times a day  PrismaSATE Dialysate BGK 4 / 2.5 5000 milliLiter(s) (1000 mL/Hr) CRRT <Continuous>  senna 2 Tablet(s) Oral at bedtime  sodium chloride 0.9%. 1000 milliLiter(s) (10 mL/Hr) IV Continuous <Continuous>  vasopressin Infusion 0.03 Unit(s)/Min (4.5 mL/Hr) IV Continuous <Continuous>    MEDICATIONS  (PRN):  fentaNYL    Injectable 12.5 MICROGram(s) IV Push every 2 hours PRN Moderate Pain (4 - 6)  fentaNYL    Injectable 25 MICROGram(s) IV Push every 3 hours PRN Severe Pain (7 - 10)  sodium chloride 0.9% lock flush 10 milliLiter(s) IV Push every 1 hour PRN Pre/post blood products, medications, blood draw, and to maintain line patency      Allergies    No Known Allergies    Intolerances        SOCIAL HISTORY:    Former smoker, ETOH, no hx drugs    FAMILY HISTORY:  FH: hypertension  mother, father- alive    FH: type 2 diabetes  mother, father- alive    PHYSICAL EXAM:  Vital Signs Last 24 Hrs  T(C): 36.5 (08 Jan 2025 08:00), Max: 37.8 (07 Jan 2025 20:00)  T(F): 97.7 (08 Jan 2025 08:00), Max: 100 (07 Jan 2025 20:00)  HR: 92 (08 Jan 2025 12:15) (76 - 107)  BP: --  BP(mean): --  RR: 20 (08 Jan 2025 12:15) (10 - 33)  SpO2: 96% (08 Jan 2025 12:15) (86% - 100%)    Parameters below as of 08 Jan 2025 12:00  Patient On (Oxygen Delivery Method): ventilator    O2 Concentration (%): 40    NAD/ Alert/ intubated/ thin  WD/ WN/Progressa bed  HEENT:  sclera clear, mucosa moist, throat clear, trachea midline, neck supple  Respiratory: nonlabored w/ equal chest rise  Gastrointestinal: soft NT/ND, (+)NGT  : Deferred   Neurology: nonverbal, can understand commands  Psych: difficult to assess  Musculoskeletal:  no deformities/ contractures  Vascular: BLE equally warm, no cyanosis, clubbing, edema nor acute ischemia  Skin:  moist w/ good turgor   sacral region, BL buttocks deep tissue injury      10.5 cm x 6.0 cm x 0.1 cm. periwound intact, without erythema        scant serosanguinous drainage, there is no increase in warmth         no induration, fluctuance, nor crepidus     Right Upper Back; deep tissue injury 7.5 cm x 4.5 cm x 0.0 cm.       There is no blistering, drainage, no odor, erythema, no increase in warmth           no induration, fluctuance, nor crepitus    LABS/ CULTURES/ RADIOLOGY:                        8.2    25.63 )-----------( 65       ( 08 Jan 2025 00:28 )             25.8       135  |  101  |  30  ----------------------------<  133      [01-08-25 @ 00:27]  3.6   |  19  |  2.00        Ca     7.9     [01-08-25 @ 00:27]      Mg     2.5     [01-08-25 @ 00:27]      Phos  2.6     [01-08-25 @ 00:27]    TPro  5.8  /  Alb  2.9  /  TBili  1.9  /  DBili  x   /  AST  97  /  ALT  240  /  AlkPhos  106  [01-08-25 @ 00:27]    PT/INR: PT 15.7 , INR 1.37       [01-08-25 @ 00:28]  PTT: 30.6       [01-08-25 @ 00:28]        A1C with Estimated Average Glucose Result: 5.6 % (12-24-24 @ 06:50)        Culture - Blood (collected 01-05-25 @ 19:50)  Source: .Blood BLOOD  Preliminary Report (01-07-25 @ 23:01):    No growth at 48 Hours    Culture - Blood (collected 01-05-25 @ 18:20)  Source: .Blood BLOOD  Preliminary Report (01-07-25 @ 22:01):    No growth at 48 Hours    Culture - Blood (collected 01-02-25 @ 20:45)  Source: .Blood BLOOD  Gram Stain (01-03-25 @ 08:12):    Growth in aerobic bottle: Gram Negative Rods    Growth in anaerobic bottle: Gram Negative Rods  Final Report (01-04-25 @ 16:15):    Growth in aerobic and anaerobic bottles: Escherichia coli ESBL  Organism: Escherichia coli ESBL (01-04-25 @ 16:15)  Organism: Escherichia coli ESBL (01-04-25 @ 16:15)      Method Type: LISA      -  Ampicillin: R >16 These ampicillin results predict results for amoxicillin      -  Ampicillin/Sulbactam: R 16/8      -  Aztreonam: R >16      -  Cefazolin: R >16      -  Cefepime: R >16      -  Ceftriaxone: R >32      -  Ciprofloxacin: R >2      -  Ertapenem: S <=0.5      -  Gentamicin: S <=2      -  Imipenem: S <=1      -  Levofloxacin: R >4      -  Meropenem: S <=1      -  Piperacillin/Tazobactam: R <=8      -  Tobramycin: S <=2      -  Trimethoprim/Sulfamethoxazole: R >2/38    Culture - Blood (collected 01-02-25 @ 20:30)  Source: .Blood BLOOD  Gram Stain (01-03-25 @ 08:11):    Growth in aerobic bottle: Gram Negative Rods    Growth in anaerobic bottle: Gram Negative Rods  Final Report (01-04-25 @ 16:16):    Growth in aerobic and anaerobic bottles: Escherichia coli ESBL    See previous culture [id number]    Direct identification is available within approximately 3-5    hours either by Blood Panel Multiplexed PCR or Direct    MALDI-TOF. Details: [email or web address]  Organism: Blood Culture PCR (01-04-25 @ 16:16)  Organism: Blood Culture PCR (01-04-25 @ 16:16)      Method Type: PCR      -  Escherichia coli: Detec      -  ESBL: Detec      -  CTX-M Resistance Marker: Detec

## 2025-01-08 NOTE — CONSULT NOTE ADULT - ASSESSMENT
Etiology of wounds is Non-Pressure Skin Failure. All appropriate Pressure Relief / Pressure Redistribution Care including but not limited to alternating air with low air loss support surface, frequent turning and repositioning, use of positioning devices, incontinence management and nutrition support. Despite optimizing pressure injury prevention measures, a state of hypoperfusion has and does exist related to kidney and respiratory failure. Skin Failure development is not related to pressure or shear but to poor tissue perfusion where blood is shunted from the skin and skeletal muscle and there is not enough time for reperfusion even with consistent positioning. The cause of skin failure is lack of blood flow needed to remove waste products and restore tissue oxygen.     A/P:  54M with PMH of HTN, HLD, ESRD on HD MWF via LUE AVF, ascites (requiring repeat paracenteses q4-6wks), NICM with moderate LV dysfunction w/ EF 35-40%(2023) and CAD s/p atherectomy + SALLIE to D1(4/11/2024) presents to [facility] transferred from [facility]. Patient initially presented to [facility] ED with shortness of breath. Of note he was recently admitted from 09/27-12/02 for acute cholecystitis s/p cholecystectomy. In [facility] ED, pt was hypoxic to 86% on RA, trop 1.7K, EKG no new changes, CXR fluid overload. Admitted for AHRF 2/2 fluid overload. Pt received HD on 12/18, 12/19, 12/20 and 12/22. DAPT was resumed, TTE showed improvement in LVEF to 40-45%. Stress test was abnormal with 1mm inferior STD, reversible ischemia in the inferior wall and fixed defects in the lateral, anterior and apical walls with post stress EF of 24%. Pt was then transferred to [facility] for cardiac cath which showed pLAD 70% ISR, mLCx 70%, D1 severe dz. Patient now transferred to [facility] CTS Dr. Rivers for CABG eval    Wound Consult requested to assist w/ management of  Sacral region/BL buttocks DTPI  Rt upper back DTPI    Recommendations:   Sacral region Triad BID and PRN soiling  Rt back: Cavilon daily       Moisturize intact skin w/ SWEEN cream BID  Nutrition Consult for optimization in pt w/ increased nutritional needs            encourage high quality protein, dedrick/ prosource, MVI & Vit C to promote wound healing  Continue turning and positioning w/ offloading assistive devices as per protocol       Continue w/ attends under pads and Pericare as per protocol  Waffle Cushion to chair when oob to chair  Continue w/ low air loss pressure redistribution bed surface   Pt will need Group 2 mattress on hospital bed and ROHO cushion for wheel chair upon discharge home  Care as per medicine, will follow w/ you  Upon discharge f/u as outpatient at [facility] [address] [facility] [phone]  Seen and D/w team & RN  Thank you for this consult,  LUISITO Manning-BC, [facility]  [phone]  Nights/ Weekends/ Holidays please call:  General Surgery Consult pager [phone]) for emergencies  Wound PT for multilayer leg wrapping or VAC issues ([phone])      Etiology of wounds is Non-Pressure Skin Failure. All appropriate Pressure Relief / Pressure Redistribution Care including but not limited to alternating air with low air loss support surface, frequent turning and repositioning, use of positioning devices, incontinence management and nutrition support. Despite optimizing pressure injury prevention measures, a state of hypoperfusion has and does exist related to kidney and respiratory failure. Skin Failure development is not related to pressure or shear but to poor tissue perfusion where blood is shunted from the skin and skeletal muscle and there is not enough time for reperfusion even with consistent positioning. The cause of skin failure is lack of blood flow needed to remove waste products and restore tissue oxygen.     A/P:  54M with PMH of HTN, HLD, ESRD on HD MWF via LUE AVF, ascites (requiring repeat paracenteses q4-6wks), NICM with moderate LV dysfunction w/ EF 35-40%(2023) and CAD s/p atherectomy + SALLIE to D1(4/11/2024) presents to [facility] transferred from [facility]. [facility]. Of note he was recently admitted from 09/27-12/02 for acute cholecystitis s/p cholecystectomy. [facility], pt was hypoxic to 86% on RA, trop 1.7K, EKG no new changes, CXR fluid overload. Admitted for AHRF 2/2 fluid overload. Pt received HD on 12/18, 12/19, 12/20 and 12/22. DAPT was resumed, TTE showed improvement in LVEF to 40-45%. Stress test was abnormal with 1mm inferior STD, reversible ischemia in the inferior wall and fixed defects in the lateral, anterior and apical walls with post stress EF of 24%. Pt was then transferred to [facility] for cardiac cath which showed pLAD 70% ISR, mLCx 70%, D1 severe dz. Patient now transferred to [facility] CTS Dr. Rivers for CABG eval    Wound Consult requested to assist w/ management of  Sacral region/BL buttocks DTI  Rt upper back DTI    Recommendations:   Sacral region Triad BID and PRN soiling  Rt back: Cavilon daily       Moisturize intact skin w/ SWEEN cream BID  Nutrition Consult for optimization in pt w/ increased nutritional needs            encourage high quality protein, dedrick/ prosource, MVI & Vit C to promote wound healing  Continue turning and positioning w/ offloading assistive devices as per protocol       Continue w/ attends under pads and Pericare as per protocol  Waffle Cushion to chair when oob to chair  Continue w/ low air loss pressure redistribution bed surface   Pt will need Group 2 mattress on hospital bed and ROHO cushion for wheel chair upon discharge home  Care as per medicine, will follow w/ you  Upon discharge f/u as outpatient at Wound [facility] [address] [facility] [phone]  Seen and D/w team & RN  Thank you for this consult,  LUISITO Manning-BC, [facility]  [phone]  Nights/ Weekends/ Holidays please call:  General Surgery Consult pager ([phone]) for emergencies  Wound PT for multilayer leg wrapping or VAC issues ([phone])

## 2025-01-08 NOTE — PROGRESS NOTE ADULT - SUBJECTIVE AND OBJECTIVE BOX
Patient is a 54y old  Male who presents with a chief complaint of CAD s/p CABG (08 Jan 2025 06:48)    Being followed by ID for        Interval history:  No other acute events      ROS:  No cough,SOB,CP  No N/V/D  No abd pain  No urinary complaints  No HA  No joint or limb pain  No other complaints    PAST MEDICAL & SURGICAL HISTORY:  Type 2 diabetes mellitus      Hypertension      End stage renal disease  started HD 2/2019 T, Th, Sat via right chest permacath      Bone spur  right shoulder- hx of - sx done      Anemia      Injury of right wrist  hx of at age 14      History of hyperkalemia  before HD- K-6.2 - to repeat in am of sx, pt had HD today      S/P arthroscopy of right shoulder  2010      History of vascular access device  right chest permacath - 2/2019      H/O right wrist surgery  tendons repair - at age 14      AV fistula      H/O ventral hernia repair      S/P cholecystectomy        Allergies    No Known Allergies    Intolerances      Antimicrobials:    meropenem  IVPB 1000 milliGRAM(s) IV Intermittent every 8 hours    MEDICATIONS  (STANDING):  albuterol/ipratropium for Nebulization 3 milliLiter(s) Nebulizer every 6 hours  aMIOdarone Infusion 0.5 mG/Min (16.7 mL/Hr) IV Continuous <Continuous>  aspirin  chewable 81 milliGRAM(s) Oral daily  bisacodyl Suppository 10 milliGRAM(s) Rectal once  chlorhexidine 0.12% Liquid 15 milliLiter(s) Oral Mucosa every 12 hours  chlorhexidine 2% Cloths 1 Application(s) Topical daily  chlorhexidine 4% Liquid 1 Application(s) Topical <User Schedule>  colchicine 0.3 milliGRAM(s) Oral daily  CRRT Treatment    <Continuous>  dexMEDEtomidine Infusion 0.5 MICROgram(s)/kG/Hr (10.6 mL/Hr) IV Continuous <Continuous>  dextrose 50% Injectable 50 milliLiter(s) IV Push every 15 minutes  dextrose 50% Injectable 25 milliLiter(s) IV Push every 15 minutes  DOBUTamine Infusion 2.5 MICROgram(s)/kG/Min (3.19 mL/Hr) IV Continuous <Continuous>  epoetin ignacia (EPOGEN) Injectable 50994 Unit(s) IV Push <User Schedule>  insulin lispro (ADMELOG) corrective regimen sliding scale   SubCutaneous every 6 hours  lidocaine   4% Patch 2 Patch Transdermal daily  meropenem  IVPB 1000 milliGRAM(s) IV Intermittent every 8 hours  Nephro-elicia 1 Tablet(s) Oral daily  pantoprazole  Injectable 40 milliGRAM(s) IV Push daily  Phoxillum Filtration BK 4 / 2.5 5000 milliLiter(s) (800 mL/Hr) CRRT <Continuous>  Phoxillum Filtration BK 4 / 2.5 5000 milliLiter(s) (200 mL/Hr) CRRT <Continuous>  polyethylene glycol 3350 17 Gram(s) Oral two times a day  PrismaSATE Dialysate BGK 4 / 2.5 5000 milliLiter(s) (1000 mL/Hr) CRRT <Continuous>  senna 2 Tablet(s) Oral at bedtime  sodium chloride 0.9%. 1000 milliLiter(s) (10 mL/Hr) IV Continuous <Continuous>  vasopressin Infusion 0.03 Unit(s)/Min (4.5 mL/Hr) IV Continuous <Continuous>      Vital Signs Last 24 Hrs  T(C): 36.5 (01-08-25 @ 08:00), Max: 38 (01-07-25 @ 12:00)  T(F): 97.7 (01-08-25 @ 08:00), Max: 100.4 (01-07-25 @ 12:00)  HR: 90 (01-08-25 @ 11:00) (80 - 107)  BP: --  BP(mean): --  RR: 20 (01-08-25 @ 11:00) (10 - 33)  SpO2: 100% (01-08-25 @ 11:00) (86% - 100%)    Physical Exam:    Constitutional well preserved,comfortable,pleasant    HEENT PERRLA EOMI,No pallor or icterus    No oral exudate or erythema    Neck supple no JVD or LN    Chest Good AE,CTA    CVS RRR S1 S2 WNl No murmur or rub or gallop    Abd soft BS normal No tenderness no masses    Ext No cyanosis clubbing or edema    IV site no erythema tenderness or discharge    Joints no swelling or LOM    CNS AAO X 3 no focal    Lab Data:                          8.2    25.63 )-----------( 65       ( 08 Jan 2025 00:28 )             25.8       01-08    135  |  101  |  30[H]  ----------------------------<  133[H]  3.6   |  19[L]  |  2.00[H]    Ca    7.9[L]      08 Jan 2025 00:27  Phos  2.6     01-08  Mg     2.5     01-08    TPro  5.8[L]  /  Alb  2.9[L]  /  TBili  1.9[H]  /  DBili  x   /  AST  97[H]  /  ALT  240[H]  /  AlkPhos  106  01-08      Urinalysis Basic - ( 08 Jan 2025 00:27 )    Color: x / Appearance: x / SG: x / pH: x  Gluc: 133 mg/dL / Ketone: x  / Bili: x / Urobili: x   Blood: x / Protein: x / Nitrite: x   Leuk Esterase: x / RBC: x / WBC x   Sq Epi: x / Non Sq Epi: x / Bacteria: x        .Blood BLOOD  01-05-25   No growth at 48 Hours  --  --      .Blood BLOOD  01-05-25   No growth at 48 Hours  --  --      Peritoneal  01-03-25   No growth  --    polymorphonuclear leukocytes seen  No organisms seen  by cytocentrifuge      Bronchial  01-03-25   Culture is being performed.  --  --      Bronchial  01-03-25   Numerous Escherichia coli ESBL  Commensal manjit consistent with body site  --  Escherichia coli ESBL      .Blood BLOOD  01-02-25   Growth in aerobic and anaerobic bottles: Escherichia coli ESBL  --  Escherichia coli ESBL      .Blood BLOOD  01-02-25   Growth in aerobic and anaerobic bottles: Escherichia coli ESBL  See previous culture [id number]  Direct identification is available within approximately 3-5  hours either by Blood Panel Multiplexed PCR or Direct  MALDI-TOF. Details: [email or web address]  --  Blood Culture PCR      .Sputum Sputum  01-02-25   Numerous Escherichia coli ESBL  Commensal manjit consistent with body site  --  Escherichia coli ESBL                    WBC Count: 25.63 (01-08-25 @ 00:28)  WBC Count: 21.04 (01-07-25 @ 00:26)  WBC Count: 23.28 (01-06-25 @ 18:25)  WBC Count: 21.81 (01-06-25 @ 13:30)  WBC Count: 23.31 (01-06-25 @ 00:35)  WBC Count: 24.30 (01-05-25 @ 00:48)  WBC Count: 19.46 (01-04-25 @ 14:33)  WBC Count: 13.29 (01-04-25 @ 00:56)  WBC Count: 9.39 (01-03-25 @ 16:17)  WBC Count: 4.71 (01-03-25 @ 09:25)  WBC Count: 1.11 (01-03-25 @ 01:31)  WBC Count: 1.35 (01-03-25 @ 00:43)  WBC Count: 14.34 (01-02-25 @ 00:33)       Bilirubin Total: 1.9 mg/dL (01-08-25 @ 00:27)  Aspartate Aminotransferase (AST/SGOT): 97 U/L (01-08-25 @ 00:27)  Alanine Aminotransferase (ALT/SGPT): 240 U/L (01-08-25 @ 00:27)  Alkaline Phosphatase: 106 U/L (01-08-25 @ 00:27)  Bilirubin Total: 2.3 mg/dL (01-07-25 @ 00:26)  Aspartate Aminotransferase (AST/SGOT): 180 U/L (01-07-25 @ 00:26)  Alanine Aminotransferase (ALT/SGPT): 377 U/L (01-07-25 @ 00:26)  Alkaline Phosphatase: 102 U/L (01-07-25 @ 00:26)  Bilirubin Total: 2.3 mg/dL (01-06-25 @ 00:35)  Aspartate Aminotransferase (AST/SGOT): 301 U/L (01-06-25 @ 00:35)  Alanine Aminotransferase (ALT/SGPT): 565 U/L (01-06-25 @ 00:35)  Alkaline Phosphatase: 122 U/L (01-06-25 @ 00:35)  Bilirubin Total: 2.6 mg/dL (01-05-25 @ 00:47)  Aspartate Aminotransferase (AST/SGOT): 560 U/L (01-05-25 @ 00:47)  Alkaline Phosphatase: 108 U/L (01-05-25 @ 00:47)  Alanine Aminotransferase (ALT/SGPT): 787 U/L (01-05-25 @ 00:47)  Bilirubin Total: 2.5 mg/dL (01-04-25 @ 14:33)  Alkaline Phosphatase: 85 U/L (01-04-25 @ 14:33)  Aspartate Aminotransferase (AST/SGOT): 690 U/L (01-04-25 @ 14:33)  Alanine Aminotransferase (ALT/SGPT): 867 U/L (01-04-25 @ 14:33)  Bilirubin Total: 2.0 mg/dL (01-04-25 @ 00:56)  Alkaline Phosphatase: 73 U/L (01-04-25 @ 00:56)  Aspartate Aminotransferase (AST/SGOT): 1029 U/L (01-04-25 @ 00:56)  Alanine Aminotransferase (ALT/SGPT): 938 U/L (01-04-25 @ 00:56)  Alkaline Phosphatase: 68 U/L (01-03-25 @ 16:17)  Bilirubin Total: 1.7 mg/dL (01-03-25 @ 16:17)  Aspartate Aminotransferase (AST/SGOT): 1253 U/L (01-03-25 @ 16:17)  Alanine Aminotransferase (ALT/SGPT): 1002 U/L (01-03-25 @ 16:17)         Patient is a 54y old  Male who presents with a chief complaint of CAD s/p CABG (08 Jan 2025 06:48)    Being followed by ID for        Interval history:  pt is afebrile today  pt is now off pressors  tolerating CVVHD  No other acute events          PAST MEDICAL & SURGICAL HISTORY:  Type 2 diabetes mellitus      Hypertension      End stage renal disease  started HD 2/2019 T, Th, Sat via right chest permacath      Bone spur  right shoulder- hx of - sx done      Anemia      Injury of right wrist  hx of at age 14      History of hyperkalemia  before HD- K-6.2 - to repeat in am of sx, pt had HD today      S/P arthroscopy of right shoulder  2010      History of vascular access device  right chest permacath - 2/2019      H/O right wrist surgery  tendons repair - at age 14      AV fistula      H/O ventral hernia repair      S/P cholecystectomy        Allergies    No Known Allergies    Intolerances      Antimicrobials:    meropenem  IVPB 1000 milliGRAM(s) IV Intermittent every 8 hours    MEDICATIONS  (STANDING):  albuterol/ipratropium for Nebulization 3 milliLiter(s) Nebulizer every 6 hours  aMIOdarone Infusion 0.5 mG/Min (16.7 mL/Hr) IV Continuous <Continuous>  aspirin  chewable 81 milliGRAM(s) Oral daily  bisacodyl Suppository 10 milliGRAM(s) Rectal once  chlorhexidine 0.12% Liquid 15 milliLiter(s) Oral Mucosa every 12 hours  chlorhexidine 2% Cloths 1 Application(s) Topical daily  chlorhexidine 4% Liquid 1 Application(s) Topical <User Schedule>  colchicine 0.3 milliGRAM(s) Oral daily  CRRT Treatment    <Continuous>  dexMEDEtomidine Infusion 0.5 MICROgram(s)/kG/Hr (10.6 mL/Hr) IV Continuous <Continuous>  dextrose 50% Injectable 50 milliLiter(s) IV Push every 15 minutes  dextrose 50% Injectable 25 milliLiter(s) IV Push every 15 minutes  DOBUTamine Infusion 2.5 MICROgram(s)/kG/Min (3.19 mL/Hr) IV Continuous <Continuous>  epoetin ignacia (EPOGEN) Injectable 29362 Unit(s) IV Push <User Schedule>  insulin lispro (ADMELOG) corrective regimen sliding scale   SubCutaneous every 6 hours  lidocaine   4% Patch 2 Patch Transdermal daily  meropenem  IVPB 1000 milliGRAM(s) IV Intermittent every 8 hours  Nephro-elicia 1 Tablet(s) Oral daily  pantoprazole  Injectable 40 milliGRAM(s) IV Push daily  Phoxillum Filtration BK 4 / 2.5 5000 milliLiter(s) (800 mL/Hr) CRRT <Continuous>  Phoxillum Filtration BK 4 / 2.5 5000 milliLiter(s) (200 mL/Hr) CRRT <Continuous>  polyethylene glycol 3350 17 Gram(s) Oral two times a day  PrismaSATE Dialysate BGK 4 / 2.5 5000 milliLiter(s) (1000 mL/Hr) CRRT <Continuous>  senna 2 Tablet(s) Oral at bedtime  sodium chloride 0.9%. 1000 milliLiter(s) (10 mL/Hr) IV Continuous <Continuous>  vasopressin Infusion 0.03 Unit(s)/Min (4.5 mL/Hr) IV Continuous <Continuous>      Vital Signs Last 24 Hrs  T(C): 36.5 (01-08-25 @ 08:00), Max: 38 (01-07-25 @ 12:00)  T(F): 97.7 (01-08-25 @ 08:00), Max: 100.4 (01-07-25 @ 12:00)  HR: 90 (01-08-25 @ 11:00) (80 - 107)  BP: --  BP(mean): --  RR: 20 (01-08-25 @ 11:00) (10 - 33)  SpO2: 100% (01-08-25 @ 11:00) (86% - 100%)    Physical Exam:    Constitutional well preserved,comfortable,pleasant    HEENT PERRLA EOMI,No pallor or icterus    No oral exudate or erythema    Neck supple no JVD or LN    Chest Good AE,CTA    CVS RRR S1 S2 WNl No murmur or rub or gallop    Abd soft BS normal No tenderness no masses    Ext No cyanosis clubbing or edema    IV site no erythema tenderness or discharge    Joints no swelling or LOM    CNS AAO X 3 no focal    Lab Data:                          8.2    25.63 )-----------( 65       ( 08 Jan 2025 00:28 )             25.8       01-08    135  |  101  |  30[H]  ----------------------------<  133[H]  3.6   |  19[L]  |  2.00[H]    Ca    7.9[L]      08 Jan 2025 00:27  Phos  2.6     01-08  Mg     2.5     01-08    TPro  5.8[L]  /  Alb  2.9[L]  /  TBili  1.9[H]  /  DBili  x   /  AST  97[H]  /  ALT  240[H]  /  AlkPhos  106  01-08      Urinalysis Basic - ( 08 Jan 2025 00:27 )    Color: x / Appearance: x / SG: x / pH: x  Gluc: 133 mg/dL / Ketone: x  / Bili: x / Urobili: x   Blood: x / Protein: x / Nitrite: x   Leuk Esterase: x / RBC: x / WBC x   Sq Epi: x / Non Sq Epi: x / Bacteria: x        .Blood BLOOD  01-05-25   No growth at 48 Hours  --  --      .Blood BLOOD  01-05-25   No growth at 48 Hours  --  --      Peritoneal  01-03-25   No growth  --    polymorphonuclear leukocytes seen  No organisms seen  by cytocentrifuge      Bronchial  01-03-25   Culture is being performed.  --  --      Bronchial  01-03-25   Numerous Escherichia coli ESBL  Commensal manjit consistent with body site  --  Escherichia coli ESBL      .Blood BLOOD  01-02-25   Growth in aerobic and anaerobic bottles: Escherichia coli ESBL  --  Escherichia coli ESBL      .Blood BLOOD  01-02-25   Growth in aerobic and anaerobic bottles: Escherichia coli ESBL  See previous culture [id number]  Direct identification is available within approximately 3-5  hours either by Blood Panel Multiplexed PCR or Direct  MALDI-TOF. Details: [email or web address]  --  Blood Culture PCR      .Sputum Sputum  01-02-25   Numerous Escherichia coli ESBL  Commensal manjit consistent with body site  --  Escherichia coli ESBL                    WBC Count: 25.63 (01-08-25 @ 00:28)  WBC Count: 21.04 (01-07-25 @ 00:26)  WBC Count: 23.28 (01-06-25 @ 18:25)  WBC Count: 21.81 (01-06-25 @ 13:30)  WBC Count: 23.31 (01-06-25 @ 00:35)  WBC Count: 24.30 (01-05-25 @ 00:48)  WBC Count: 19.46 (01-04-25 @ 14:33)  WBC Count: 13.29 (01-04-25 @ 00:56)  WBC Count: 9.39 (01-03-25 @ 16:17)  WBC Count: 4.71 (01-03-25 @ 09:25)  WBC Count: 1.11 (01-03-25 @ 01:31)  WBC Count: 1.35 (01-03-25 @ 00:43)  WBC Count: 14.34 (01-02-25 @ 00:33)       Bilirubin Total: 1.9 mg/dL (01-08-25 @ 00:27)  Aspartate Aminotransferase (AST/SGOT): 97 U/L (01-08-25 @ 00:27)  Alanine Aminotransferase (ALT/SGPT): 240 U/L (01-08-25 @ 00:27)  Alkaline Phosphatase: 106 U/L (01-08-25 @ 00:27)  Bilirubin Total: 2.3 mg/dL (01-07-25 @ 00:26)  Aspartate Aminotransferase (AST/SGOT): 180 U/L (01-07-25 @ 00:26)  Alanine Aminotransferase (ALT/SGPT): 377 U/L (01-07-25 @ 00:26)  Alkaline Phosphatase: 102 U/L (01-07-25 @ 00:26)  Bilirubin Total: 2.3 mg/dL (01-06-25 @ 00:35)  Aspartate Aminotransferase (AST/SGOT): 301 U/L (01-06-25 @ 00:35)  Alanine Aminotransferase (ALT/SGPT): 565 U/L (01-06-25 @ 00:35)  Alkaline Phosphatase: 122 U/L (01-06-25 @ 00:35)  Bilirubin Total: 2.6 mg/dL (01-05-25 @ 00:47)  Aspartate Aminotransferase (AST/SGOT): 560 U/L (01-05-25 @ 00:47)  Alkaline Phosphatase: 108 U/L (01-05-25 @ 00:47)  Alanine Aminotransferase (ALT/SGPT): 787 U/L (01-05-25 @ 00:47)  Bilirubin Total: 2.5 mg/dL (01-04-25 @ 14:33)  Alkaline Phosphatase: 85 U/L (01-04-25 @ 14:33)  Aspartate Aminotransferase (AST/SGOT): 690 U/L (01-04-25 @ 14:33)  Alanine Aminotransferase (ALT/SGPT): 867 U/L (01-04-25 @ 14:33)  Bilirubin Total: 2.0 mg/dL (01-04-25 @ 00:56)  Alkaline Phosphatase: 73 U/L (01-04-25 @ 00:56)  Aspartate Aminotransferase (AST/SGOT): 1029 U/L (01-04-25 @ 00:56)  Alanine Aminotransferase (ALT/SGPT): 938 U/L (01-04-25 @ 00:56)  Alkaline Phosphatase: 68 U/L (01-03-25 @ 16:17)  Bilirubin Total: 1.7 mg/dL (01-03-25 @ 16:17)  Aspartate Aminotransferase (AST/SGOT): 1253 U/L (01-03-25 @ 16:17)  Alanine Aminotransferase (ALT/SGPT): 1002 U/L (01-03-25 @ 16:17)         Patient is a 54y old  Male who presents with a chief complaint of CAD s/p CABG (08 Jan 2025 06:48)    Being followed by ID for        Interval history:  pt is afebrile today  pt is now off pressors  tolerating CVVHD  no urine output   No other acute events          PAST MEDICAL & SURGICAL HISTORY:  Type 2 diabetes mellitus      Hypertension      End stage renal disease  started HD 2/2019 T, Th, Sat via right chest permacath      Bone spur  right shoulder- hx of - sx done      Anemia      Injury of right wrist  hx of at age 14      History of hyperkalemia  before HD- K-6.2 - to repeat in am of sx, pt had HD today      S/P arthroscopy of right shoulder  2010      History of vascular access device  right chest permacath - 2/2019      H/O right wrist surgery  tendons repair - at age 14      AV fistula      H/O ventral hernia repair      S/P cholecystectomy        Allergies    No Known Allergies    Intolerances      Antimicrobials:    meropenem  IVPB 1000 milliGRAM(s) IV Intermittent every 8 hours    MEDICATIONS  (STANDING):  albuterol/ipratropium for Nebulization 3 milliLiter(s) Nebulizer every 6 hours  aMIOdarone Infusion 0.5 mG/Min (16.7 mL/Hr) IV Continuous <Continuous>  aspirin  chewable 81 milliGRAM(s) Oral daily  bisacodyl Suppository 10 milliGRAM(s) Rectal once  chlorhexidine 0.12% Liquid 15 milliLiter(s) Oral Mucosa every 12 hours  chlorhexidine 2% Cloths 1 Application(s) Topical daily  chlorhexidine 4% Liquid 1 Application(s) Topical <User Schedule>  colchicine 0.3 milliGRAM(s) Oral daily  CRRT Treatment    <Continuous>  dexMEDEtomidine Infusion 0.5 MICROgram(s)/kG/Hr (10.6 mL/Hr) IV Continuous <Continuous>  dextrose 50% Injectable 50 milliLiter(s) IV Push every 15 minutes  dextrose 50% Injectable 25 milliLiter(s) IV Push every 15 minutes  DOBUTamine Infusion 2.5 MICROgram(s)/kG/Min (3.19 mL/Hr) IV Continuous <Continuous>  epoetin ignacia (EPOGEN) Injectable 50952 Unit(s) IV Push <User Schedule>  insulin lispro (ADMELOG) corrective regimen sliding scale   SubCutaneous every 6 hours  lidocaine   4% Patch 2 Patch Transdermal daily  meropenem  IVPB 1000 milliGRAM(s) IV Intermittent every 8 hours  Nephro-elicia 1 Tablet(s) Oral daily  pantoprazole  Injectable 40 milliGRAM(s) IV Push daily  Phoxillum Filtration BK 4 / 2.5 5000 milliLiter(s) (800 mL/Hr) CRRT <Continuous>  Phoxillum Filtration BK 4 / 2.5 5000 milliLiter(s) (200 mL/Hr) CRRT <Continuous>  polyethylene glycol 3350 17 Gram(s) Oral two times a day  PrismaSATE Dialysate BGK 4 / 2.5 5000 milliLiter(s) (1000 mL/Hr) CRRT <Continuous>  senna 2 Tablet(s) Oral at bedtime  sodium chloride 0.9%. 1000 milliLiter(s) (10 mL/Hr) IV Continuous <Continuous>  vasopressin Infusion 0.03 Unit(s)/Min (4.5 mL/Hr) IV Continuous <Continuous>      Vital Signs Last 24 Hrs  T(C): 36.5 (01-08-25 @ 08:00), Max: 38 (01-07-25 @ 12:00)  T(F): 97.7 (01-08-25 @ 08:00), Max: 100.4 (01-07-25 @ 12:00)  HR: 90 (01-08-25 @ 11:00) (80 - 107)  BP: --  BP(mean): --  RR: 20 (01-08-25 @ 11:00) (10 - 33)  SpO2: 100% (01-08-25 @ 11:00) (86% - 100%)    Physical Exam:    Constitutional remains intubated     HEENT PERRLA EOMI,No pallor or icterus    No oral exudate or erythema    Neck supple no JVD or LN    Chest Good AE,CTA    CVS  S1 S2   Abd soft BS normal No tenderness softly distended     Ext No cyanosis clubbing or edema    IV site no erythema tenderness or discharge    Joints no swelling or LOM    CNS AAO X 3 no focal    Lab Data:                          8.2    25.63 )-----------( 65       ( 08 Jan 2025 00:28 )             25.8       01-08    135  |  101  |  30[H]  ----------------------------<  133[H]  3.6   |  19[L]  |  2.00[H]    Ca    7.9[L]      08 Jan 2025 00:27  Phos  2.6     01-08  Mg     2.5     01-08    TPro  5.8[L]  /  Alb  2.9[L]  /  TBili  1.9[H]  /  DBili  x   /  AST  97[H]  /  ALT  240[H]  /  AlkPhos  106  01-08        .Blood BLOOD  01-05-25   No growth at 48 Hours  --  --      .Blood BLOOD  01-05-25   No growth at 48 Hours  --  --      Peritoneal  01-03-25   No growth  --    polymorphonuclear leukocytes seen  No organisms seen  by cytocentrifuge      Bronchial  01-03-25   Culture is being performed.  --  --      Bronchial  01-03-25   Numerous Escherichia coli ESBL  Commensal manjit consistent with body site  --  Escherichia coli ESBL      .Blood BLOOD  01-02-25   Growth in aerobic and anaerobic bottles: Escherichia coli ESBL  --  Escherichia coli ESBL      .Blood BLOOD  01-02-25   Growth in aerobic and anaerobic bottles: Escherichia coli ESBL  See previous culture [id number]  Direct identification is available within approximately 3-5  hours either by Blood Panel Multiplexed PCR or Direct  MALDI-TOF. Details: [email or web address]  --  Blood Culture PCR      .Sputum Sputum  01-02-25   Numerous Escherichia coli ESBL  Commensal manjit consistent with body site  --  Escherichia coli ESBL                    WBC Count: 25.63 (01-08-25 @ 00:28)  WBC Count: 21.04 (01-07-25 @ 00:26)  WBC Count: 23.28 (01-06-25 @ 18:25)  WBC Count: 21.81 (01-06-25 @ 13:30)  WBC Count: 23.31 (01-06-25 @ 00:35)  WBC Count: 24.30 (01-05-25 @ 00:48)  WBC Count: 19.46 (01-04-25 @ 14:33)  WBC Count: 13.29 (01-04-25 @ 00:56)  WBC Count: 9.39 (01-03-25 @ 16:17)  WBC Count: 4.71 (01-03-25 @ 09:25)  WBC Count: 1.11 (01-03-25 @ 01:31)  WBC Count: 1.35 (01-03-25 @ 00:43)  WBC Count: 14.34 (01-02-25 @ 00:33)       Bilirubin Total: 1.9 mg/dL (01-08-25 @ 00:27)  Aspartate Aminotransferase (AST/SGOT): 97 U/L (01-08-25 @ 00:27)  Alanine Aminotransferase (ALT/SGPT): 240 U/L (01-08-25 @ 00:27)  Alkaline Phosphatase: 106 U/L (01-08-25 @ 00:27)  Bilirubin Total: 2.3 mg/dL (01-07-25 @ 00:26)  Aspartate Aminotransferase (AST/SGOT): 180 U/L (01-07-25 @ 00:26)  Alanine Aminotransferase (ALT/SGPT): 377 U/L (01-07-25 @ 00:26)  Alkaline Phosphatase: 102 U/L (01-07-25 @ 00:26)  Bilirubin Total: 2.3 mg/dL (01-06-25 @ 00:35)  Aspartate Aminotransferase (AST/SGOT): 301 U/L (01-06-25 @ 00:35)  Alanine Aminotransferase (ALT/SGPT): 565 U/L (01-06-25 @ 00:35)  Alkaline Phosphatase: 122 U/L (01-06-25 @ 00:35)  Bilirubin Total: 2.6 mg/dL (01-05-25 @ 00:47)  Aspartate Aminotransferase (AST/SGOT): 560 U/L (01-05-25 @ 00:47)  Alkaline Phosphatase: 108 U/L (01-05-25 @ 00:47)  Alanine Aminotransferase (ALT/SGPT): 787 U/L (01-05-25 @ 00:47)  Bilirubin Total: 2.5 mg/dL (01-04-25 @ 14:33)  Alkaline Phosphatase: 85 U/L (01-04-25 @ 14:33)  Aspartate Aminotransferase (AST/SGOT): 690 U/L (01-04-25 @ 14:33)  Alanine Aminotransferase (ALT/SGPT): 867 U/L (01-04-25 @ 14:33)  Bilirubin Total: 2.0 mg/dL (01-04-25 @ 00:56)  Alkaline Phosphatase: 73 U/L (01-04-25 @ 00:56)  Aspartate Aminotransferase (AST/SGOT): 1029 U/L (01-04-25 @ 00:56)  Alanine Aminotransferase (ALT/SGPT): 938 U/L (01-04-25 @ 00:56)  Alkaline Phosphatase: 68 U/L (01-03-25 @ 16:17)  Bilirubin Total: 1.7 mg/dL (01-03-25 @ 16:17)  Aspartate Aminotransferase (AST/SGOT): 1253 U/L (01-03-25 @ 16:17)  Alanine Aminotransferase (ALT/SGPT): 1002 U/L (01-03-25 @ 16:17)         Patient is a 54y old  Male who presents with a chief complaint of CAD s/p CABG (08 Jan 2025 06:48)    Being followed by ID for        Interval history:  pt is afebrile today    pt is now off pressors  tolerating CVVHD  no urine output   No other acute events          PAST MEDICAL & SURGICAL HISTORY:  Type 2 diabetes mellitus      Hypertension      End stage renal disease  started HD 2/2019 T, Th, Sat via right chest permacath      Bone spur  right shoulder- hx of - sx done      Anemia      Injury of right wrist  hx of at age 14      History of hyperkalemia  before HD- K-6.2 - to repeat in am of sx, pt had HD today      S/P arthroscopy of right shoulder  2010      History of vascular access device  right chest permacath - 2/2019      H/O right wrist surgery  tendons repair - at age 14      AV fistula      H/O ventral hernia repair      S/P cholecystectomy        Allergies    No Known Allergies    Intolerances      Antimicrobials:    meropenem  IVPB 1000 milliGRAM(s) IV Intermittent every 8 hours    MEDICATIONS  (STANDING):  albuterol/ipratropium for Nebulization 3 milliLiter(s) Nebulizer every 6 hours  aMIOdarone Infusion 0.5 mG/Min (16.7 mL/Hr) IV Continuous <Continuous>  aspirin  chewable 81 milliGRAM(s) Oral daily  bisacodyl Suppository 10 milliGRAM(s) Rectal once  chlorhexidine 0.12% Liquid 15 milliLiter(s) Oral Mucosa every 12 hours  chlorhexidine 2% Cloths 1 Application(s) Topical daily  chlorhexidine 4% Liquid 1 Application(s) Topical <User Schedule>  colchicine 0.3 milliGRAM(s) Oral daily  CRRT Treatment    <Continuous>  dexMEDEtomidine Infusion 0.5 MICROgram(s)/kG/Hr (10.6 mL/Hr) IV Continuous <Continuous>  dextrose 50% Injectable 50 milliLiter(s) IV Push every 15 minutes  dextrose 50% Injectable 25 milliLiter(s) IV Push every 15 minutes  DOBUTamine Infusion 2.5 MICROgram(s)/kG/Min (3.19 mL/Hr) IV Continuous <Continuous>  epoetin ignacia (EPOGEN) Injectable 18475 Unit(s) IV Push <User Schedule>  insulin lispro (ADMELOG) corrective regimen sliding scale   SubCutaneous every 6 hours  lidocaine   4% Patch 2 Patch Transdermal daily  meropenem  IVPB 1000 milliGRAM(s) IV Intermittent every 8 hours  Nephro-elicia 1 Tablet(s) Oral daily  pantoprazole  Injectable 40 milliGRAM(s) IV Push daily  Phoxillum Filtration BK 4 / 2.5 5000 milliLiter(s) (800 mL/Hr) CRRT <Continuous>  Phoxillum Filtration BK 4 / 2.5 5000 milliLiter(s) (200 mL/Hr) CRRT <Continuous>  polyethylene glycol 3350 17 Gram(s) Oral two times a day  PrismaSATE Dialysate BGK 4 / 2.5 5000 milliLiter(s) (1000 mL/Hr) CRRT <Continuous>  senna 2 Tablet(s) Oral at bedtime  sodium chloride 0.9%. 1000 milliLiter(s) (10 mL/Hr) IV Continuous <Continuous>  vasopressin Infusion 0.03 Unit(s)/Min (4.5 mL/Hr) IV Continuous <Continuous>      Vital Signs Last 24 Hrs  T(C): 36.5 (01-08-25 @ 08:00), Max: 38 (01-07-25 @ 12:00)  T(F): 97.7 (01-08-25 @ 08:00), Max: 100.4 (01-07-25 @ 12:00)  HR: 90 (01-08-25 @ 11:00) (80 - 107)  BP: --  BP(mean): --  RR: 20 (01-08-25 @ 11:00) (10 - 33)  SpO2: 100% (01-08-25 @ 11:00) (86% - 100%)    Physical Exam:    Constitutional remains intubated     HEENT PERRLA EOMI,No pallor or icterus    No oral exudate or erythema    Neck supple no JVD or LN    Chest Good AE,CTA    CVS  S1 S2   Abd soft BS normal No tenderness softly distended     Ext No cyanosis clubbing or edema     IV site no erythema tenderness or discharge    Joints no swelling or LOM    CNS AAO X 3 no focal    Lab Data:                          8.2    25.63 )-----------( 65       ( 08 Jan 2025 00:28 )             25.8       01-08    135  |  101  |  30[H]  ----------------------------<  133[H]  3.6   |  19[L]  |  2.00[H]    Ca    7.9[L]      08 Jan 2025 00:27  Phos  2.6     01-08  Mg     2.5     01-08    TPro  5.8[L]  /  Alb  2.9[L]  /  TBili  1.9[H]  /  DBili  x   /  AST  97[H]  /  ALT  240[H]  /  AlkPhos  106  01-08        .Blood BLOOD  01-05-25   No growth at 48 Hours  --  --      .Blood BLOOD  01-05-25   No growth at 48 Hours  --  --      Peritoneal  01-03-25   No growth  --    polymorphonuclear leukocytes seen  No organisms seen  by cytocentrifuge      Bronchial  01-03-25   Culture is being performed.  --  --      Bronchial  01-03-25   Numerous Escherichia coli ESBL  Commensal manjit consistent with body site  --  Escherichia coli ESBL      .Blood BLOOD  01-02-25   Growth in aerobic and anaerobic bottles: Escherichia coli ESBL  --  Escherichia coli ESBL      .Blood BLOOD  01-02-25   Growth in aerobic and anaerobic bottles: Escherichia coli ESBL  See previous culture [id number]  Direct identification is available within approximately 3-5  hours either by Blood Panel Multiplexed PCR or Direct  MALDI-TOF. Details: [email or web address]  --  Blood Culture PCR      .Sputum Sputum  01-02-25   Numerous Escherichia coli ESBL  Commensal manjit consistent with body site  --  Escherichia coli ESBL            WBC Count: 25.63 (01-08-25 @ 00:28)  WBC Count: 21.04 (01-07-25 @ 00:26)  WBC Count: 23.28 (01-06-25 @ 18:25)  WBC Count: 21.81 (01-06-25 @ 13:30)  WBC Count: 23.31 (01-06-25 @ 00:35)  WBC Count: 24.30 (01-05-25 @ 00:48)  WBC Count: 19.46 (01-04-25 @ 14:33)  WBC Count: 13.29 (01-04-25 @ 00:56)  WBC Count: 9.39 (01-03-25 @ 16:17)  WBC Count: 4.71 (01-03-25 @ 09:25)  WBC Count: 1.11 (01-03-25 @ 01:31)  WBC Count: 1.35 (01-03-25 @ 00:43)  WBC Count: 14.34 (01-02-25 @ 00:33)       Bilirubin Total: 1.9 mg/dL (01-08-25 @ 00:27)  Aspartate Aminotransferase (AST/SGOT): 97 U/L (01-08-25 @ 00:27)  Alanine Aminotransferase (ALT/SGPT): 240 U/L (01-08-25 @ 00:27)  Alkaline Phosphatase: 106 U/L (01-08-25 @ 00:27)    Bilirubin Total: 2.3 mg/dL (01-07-25 @ 00:26)  Aspartate Aminotransferase (AST/SGOT): 180 U/L (01-07-25 @ 00:26)  Alanine Aminotransferase (ALT/SGPT): 377 U/L (01-07-25 @ 00:26)  Alkaline Phosphatase: 102 U/L (01-07-25 @ 00:26)    Bilirubin Total: 2.3 mg/dL (01-06-25 @ 00:35)  Aspartate Aminotransferase (AST/SGOT): 301 U/L (01-06-25 @ 00:35)  Alanine Aminotransferase (ALT/SGPT): 565 U/L (01-06-25 @ 00:35)  Alkaline Phosphatase: 122 U/L (01-06-25 @ 00:35)    Bilirubin Total: 2.6 mg/dL (01-05-25 @ 00:47)  Aspartate Aminotransferase (AST/SGOT): 560 U/L (01-05-25 @ 00:47)  Alkaline Phosphatase: 108 U/L (01-05-25 @ 00:47)  Alanine Aminotransferase (ALT/SGPT): 787 U/L (01-05-25 @ 00:47)        < from: Xray Chest 1 View- PORTABLE-Urgent (Xray Chest 1 View- PORTABLE-Urgent .) (01.08.25 @ 07:06) >    IMPRESSION:  On the final image of the series, the left IJ approach central venous   catheter tip terminates in the left brachiocephalic vein.  Endotracheal tube with tip above the ema.  Enteric tubes descend below level of diaphragm and out of the   field-of-view.  Small left pleural effusion with associated atelectasis. Right lung is   clear.    --- End of Report ---    < end of copied text >     Patient is a 54y old  Male who presents with a chief complaint of CAD s/p CABG (08 Jan 2025 06:48)    Being followed by ID for        Interval history:  pt is afebrile today    pt is now off pressors  tolerating CVVHD  s/p bronchoscopy  no urine output   No other acute events          PAST MEDICAL & SURGICAL HISTORY:  Type 2 diabetes mellitus      Hypertension      End stage renal disease  started HD 2/2019 T, Th, Sat via right chest permacath      Bone spur  right shoulder- hx of - sx done      Anemia      Injury of right wrist  hx of at age 14      History of hyperkalemia  before HD- K-6.2 - to repeat in am of sx, pt had HD today      S/P arthroscopy of right shoulder  2010      History of vascular access device  right chest permacath - 2/2019      H/O right wrist surgery  tendons repair - at age 14      AV fistula      H/O ventral hernia repair      S/P cholecystectomy        Allergies    No Known Allergies    Intolerances      Antimicrobials:    meropenem  IVPB 1000 milliGRAM(s) IV Intermittent every 8 hours    MEDICATIONS  (STANDING):  albuterol/ipratropium for Nebulization 3 milliLiter(s) Nebulizer every 6 hours  aMIOdarone Infusion 0.5 mG/Min (16.7 mL/Hr) IV Continuous <Continuous>  aspirin  chewable 81 milliGRAM(s) Oral daily  bisacodyl Suppository 10 milliGRAM(s) Rectal once  chlorhexidine 0.12% Liquid 15 milliLiter(s) Oral Mucosa every 12 hours  chlorhexidine 2% Cloths 1 Application(s) Topical daily  chlorhexidine 4% Liquid 1 Application(s) Topical <User Schedule>  colchicine 0.3 milliGRAM(s) Oral daily  CRRT Treatment    <Continuous>  dexMEDEtomidine Infusion 0.5 MICROgram(s)/kG/Hr (10.6 mL/Hr) IV Continuous <Continuous>  dextrose 50% Injectable 50 milliLiter(s) IV Push every 15 minutes  dextrose 50% Injectable 25 milliLiter(s) IV Push every 15 minutes  DOBUTamine Infusion 2.5 MICROgram(s)/kG/Min (3.19 mL/Hr) IV Continuous <Continuous>  epoetin igancia (EPOGEN) Injectable 22724 Unit(s) IV Push <User Schedule>  insulin lispro (ADMELOG) corrective regimen sliding scale   SubCutaneous every 6 hours  lidocaine   4% Patch 2 Patch Transdermal daily  meropenem  IVPB 1000 milliGRAM(s) IV Intermittent every 8 hours  Nephro-elicia 1 Tablet(s) Oral daily  pantoprazole  Injectable 40 milliGRAM(s) IV Push daily  Phoxillum Filtration BK 4 / 2.5 5000 milliLiter(s) (800 mL/Hr) CRRT <Continuous>  Phoxillum Filtration BK 4 / 2.5 5000 milliLiter(s) (200 mL/Hr) CRRT <Continuous>  polyethylene glycol 3350 17 Gram(s) Oral two times a day  PrismaSATE Dialysate BGK 4 / 2.5 5000 milliLiter(s) (1000 mL/Hr) CRRT <Continuous>  senna 2 Tablet(s) Oral at bedtime  sodium chloride 0.9%. 1000 milliLiter(s) (10 mL/Hr) IV Continuous <Continuous>  vasopressin Infusion 0.03 Unit(s)/Min (4.5 mL/Hr) IV Continuous <Continuous>      Vital Signs Last 24 Hrs  T(C): 36.5 (01-08-25 @ 08:00), Max: 38 (01-07-25 @ 12:00)  T(F): 97.7 (01-08-25 @ 08:00), Max: 100.4 (01-07-25 @ 12:00)  HR: 90 (01-08-25 @ 11:00) (80 - 107)  BP: --  BP(mean): --  RR: 20 (01-08-25 @ 11:00) (10 - 33)  SpO2: 100% (01-08-25 @ 11:00) (86% - 100%)    Physical Exam:    Constitutional remains intubated     HEENT PERRLA EOMI,No pallor or icterus    No oral exudate or erythema    Neck supple no JVD or LN    Chest Good AE,CTA    CVS  S1 S2   Abd soft BS normal No tenderness softly distended     Ext No cyanosis clubbing or edema     IV site no erythema tenderness or discharge    Joints no swelling or LOM    CNS AAO X 3 no focal    Lab Data:                          8.2    25.63 )-----------( 65       ( 08 Jan 2025 00:28 )             25.8       01-08    135  |  101  |  30[H]  ----------------------------<  133[H]  3.6   |  19[L]  |  2.00[H]    Ca    7.9[L]      08 Jan 2025 00:27  Phos  2.6     01-08  Mg     2.5     01-08    TPro  5.8[L]  /  Alb  2.9[L]  /  TBili  1.9[H]  /  DBili  x   /  AST  97[H]  /  ALT  240[H]  /  AlkPhos  106  01-08        .Blood BLOOD  01-05-25   No growth at 48 Hours  --  --      .Blood BLOOD  01-05-25   No growth at 48 Hours  --  --      Peritoneal  01-03-25   No growth  --    polymorphonuclear leukocytes seen  No organisms seen  by cytocentrifuge      Bronchial  01-03-25   Culture is being performed.  --  --      Bronchial  01-03-25   Numerous Escherichia coli ESBL  Commensal manjit consistent with body site  --  Escherichia coli ESBL      .Blood BLOOD  01-02-25   Growth in aerobic and anaerobic bottles: Escherichia coli ESBL  --  Escherichia coli ESBL      .Blood BLOOD  01-02-25   Growth in aerobic and anaerobic bottles: Escherichia coli ESBL  See previous culture [id number]  Direct identification is available within approximately 3-5  hours either by Blood Panel Multiplexed PCR or Direct  MALDI-TOF. Details: [email or web address]  --  Blood Culture PCR      .Sputum Sputum  01-02-25   Numerous Escherichia coli ESBL  Commensal manjit consistent with body site  --  Escherichia coli ESBL            WBC Count: 25.63 (01-08-25 @ 00:28)  WBC Count: 21.04 (01-07-25 @ 00:26)  WBC Count: 23.28 (01-06-25 @ 18:25)  WBC Count: 21.81 (01-06-25 @ 13:30)  WBC Count: 23.31 (01-06-25 @ 00:35)  WBC Count: 24.30 (01-05-25 @ 00:48)  WBC Count: 19.46 (01-04-25 @ 14:33)  WBC Count: 13.29 (01-04-25 @ 00:56)  WBC Count: 9.39 (01-03-25 @ 16:17)  WBC Count: 4.71 (01-03-25 @ 09:25)  WBC Count: 1.11 (01-03-25 @ 01:31)  WBC Count: 1.35 (01-03-25 @ 00:43)  WBC Count: 14.34 (01-02-25 @ 00:33)       Bilirubin Total: 1.9 mg/dL (01-08-25 @ 00:27)  Aspartate Aminotransferase (AST/SGOT): 97 U/L (01-08-25 @ 00:27)  Alanine Aminotransferase (ALT/SGPT): 240 U/L (01-08-25 @ 00:27)  Alkaline Phosphatase: 106 U/L (01-08-25 @ 00:27)    Bilirubin Total: 2.3 mg/dL (01-07-25 @ 00:26)  Aspartate Aminotransferase (AST/SGOT): 180 U/L (01-07-25 @ 00:26)  Alanine Aminotransferase (ALT/SGPT): 377 U/L (01-07-25 @ 00:26)  Alkaline Phosphatase: 102 U/L (01-07-25 @ 00:26)    Bilirubin Total: 2.3 mg/dL (01-06-25 @ 00:35)  Aspartate Aminotransferase (AST/SGOT): 301 U/L (01-06-25 @ 00:35)  Alanine Aminotransferase (ALT/SGPT): 565 U/L (01-06-25 @ 00:35)  Alkaline Phosphatase: 122 U/L (01-06-25 @ 00:35)    Bilirubin Total: 2.6 mg/dL (01-05-25 @ 00:47)  Aspartate Aminotransferase (AST/SGOT): 560 U/L (01-05-25 @ 00:47)  Alkaline Phosphatase: 108 U/L (01-05-25 @ 00:47)  Alanine Aminotransferase (ALT/SGPT): 787 U/L (01-05-25 @ 00:47)        < from: Xray Chest 1 View- PORTABLE-Urgent (Xray Chest 1 View- PORTABLE-Urgent .) (01.08.25 @ 07:06) >    IMPRESSION:  On the final image of the series, the left IJ approach central venous   catheter tip terminates in the left brachiocephalic vein.  Endotracheal tube with tip above the ema.  Enteric tubes descend below level of diaphragm and out of the   field-of-view.  Small left pleural effusion with associated atelectasis. Right lung is   clear.    --- End of Report ---    < end of copied text >

## 2025-01-08 NOTE — CONSULT NOTE ADULT - ASSESSMENT
54M with PMH of HTN, HLD, ESRD on HD MWF via LUE AVF, ascites (requiring repeat paracenteses q4-6wks), NICM with moderate LV dysfunction w/ EF 35-40%(2023) s/p CABG on 12/30/24. General surgery consulted today for tracheostomy creation.     Recommendations  - Tracheostomy procedure explained to patient however patient currently declining procedure. Patient understands the risks and benefits of the procedure and the risks of prolonged intubation. Patient states he understands the risks and currently declines the procedure  - pain control PRN  - appreciate care per primary team  - surgery to be available if pt amenable to tracheostomy. Please contact pager for further updates    Plan discussed with attending Dr. Thomson     Trauma/ACS  [phone]

## 2025-01-08 NOTE — AIRWAY REMOVAL NOTE  ADULT & PEDS - ARTIFICAL AIRWAY REMOVAL COMMENTS
Written order for extubation verified. The patient was identified by full name and birth date compared to the identification band. Present during the procedure was MARIA ELENA Anguiano and  MD Thakur Written order for extubation verified. The patient was identified by full name and birth date compared to the identification band. Present during the procedure was MARIA ELENA Thakur and RT Novoa Written order for extubation verified. The patient was identified by full name and birth date compared to the identification band. Present during the procedure was MARIA ELENA Finch and RT Bright COUCH

## 2025-01-09 ENCOUNTER — RESULT REVIEW (OUTPATIENT)
Age: 55
End: 2025-01-09

## 2025-01-09 LAB
ALBUMIN SERPL ELPH-MCNC: 2.8 G/DL — LOW (ref 3.3–5)
ALP SERPL-CCNC: 114 U/L — SIGNIFICANT CHANGE UP (ref 40–120)
ALT FLD-CCNC: 171 U/L — HIGH (ref 10–45)
ANION GAP SERPL CALC-SCNC: 13 MMOL/L — SIGNIFICANT CHANGE UP (ref 5–17)
APTT BLD: 31.2 SEC — SIGNIFICANT CHANGE UP (ref 24.5–35.6)
APTT BLD: 31.3 SEC — SIGNIFICANT CHANGE UP (ref 24.5–35.6)
AST SERPL-CCNC: 68 U/L — HIGH (ref 10–40)
BASOPHILS # BLD AUTO: 0.13 K/UL — SIGNIFICANT CHANGE UP (ref 0–0.2)
BASOPHILS NFR BLD AUTO: 0.5 % — SIGNIFICANT CHANGE UP (ref 0–2)
BILIRUB SERPL-MCNC: 1.5 MG/DL — HIGH (ref 0.2–1.2)
BUN SERPL-MCNC: 32 MG/DL — HIGH (ref 7–23)
CALCIUM SERPL-MCNC: 8 MG/DL — LOW (ref 8.4–10.5)
CHLORIDE SERPL-SCNC: 103 MMOL/L — SIGNIFICANT CHANGE UP (ref 96–108)
CO2 SERPL-SCNC: 19 MMOL/L — LOW (ref 22–31)
CREAT SERPL-MCNC: 1.99 MG/DL — HIGH (ref 0.5–1.3)
EGFR: 39 ML/MIN/1.73M2 — LOW
EGFR: 39 ML/MIN/1.73M2 — LOW
EOSINOPHIL # BLD AUTO: 0.13 K/UL — SIGNIFICANT CHANGE UP (ref 0–0.5)
EOSINOPHIL NFR BLD AUTO: 0.5 % — SIGNIFICANT CHANGE UP (ref 0–6)
GAS PNL BLDA: SIGNIFICANT CHANGE UP
GAS PNL BLDV: SIGNIFICANT CHANGE UP
GLUCOSE SERPL-MCNC: 202 MG/DL — HIGH (ref 70–99)
HCT VFR BLD CALC: 26.7 % — LOW (ref 39–50)
HGB BLD-MCNC: 8.3 G/DL — LOW (ref 13–17)
IMM GRANULOCYTES NFR BLD AUTO: 4.4 % — HIGH (ref 0–0.9)
LYMPHOCYTES # BLD AUTO: 0.54 K/UL — LOW (ref 1–3.3)
LYMPHOCYTES # BLD AUTO: 1.9 % — LOW (ref 13–44)
MAGNESIUM SERPL-MCNC: 2.4 MG/DL — SIGNIFICANT CHANGE UP (ref 1.6–2.6)
MCHC RBC-ENTMCNC: 30 PG — SIGNIFICANT CHANGE UP (ref 27–34)
MCHC RBC-ENTMCNC: 31.1 G/DL — LOW (ref 32–36)
MCV RBC AUTO: 96.4 FL — SIGNIFICANT CHANGE UP (ref 80–100)
MONOCYTES # BLD AUTO: 1.05 K/UL — HIGH (ref 0–0.9)
MONOCYTES NFR BLD AUTO: 3.8 % — SIGNIFICANT CHANGE UP (ref 2–14)
NEUTROPHILS # BLD AUTO: 24.77 K/UL — HIGH (ref 1.8–7.4)
NEUTROPHILS NFR BLD AUTO: 88.9 % — HIGH (ref 43–77)
NRBC # BLD: 0 /100 WBCS — SIGNIFICANT CHANGE UP (ref 0–0)
NRBC BLD-RTO: 0 /100 WBCS — SIGNIFICANT CHANGE UP (ref 0–0)
PHOSPHATE SERPL-MCNC: 2.6 MG/DL — SIGNIFICANT CHANGE UP (ref 2.5–4.5)
PLATELET # BLD AUTO: 82 K/UL — LOW (ref 150–400)
POTASSIUM SERPL-MCNC: 3.7 MMOL/L — SIGNIFICANT CHANGE UP (ref 3.5–5.3)
POTASSIUM SERPL-SCNC: 3.7 MMOL/L — SIGNIFICANT CHANGE UP (ref 3.5–5.3)
PROT SERPL-MCNC: 5.7 G/DL — LOW (ref 6–8.3)
RBC # BLD: 2.77 M/UL — LOW (ref 4.2–5.8)
RBC # FLD: 18.6 % — HIGH (ref 10.3–14.5)
WBC # BLD: 27.85 K/UL — HIGH (ref 3.8–10.5)
WBC # FLD AUTO: 27.85 K/UL — HIGH (ref 3.8–10.5)

## 2025-01-09 PROCEDURE — 93308 TTE F-UP OR LMTD: CPT | Mod: 26

## 2025-01-09 PROCEDURE — 71045 X-RAY EXAM CHEST 1 VIEW: CPT | Mod: 26

## 2025-01-09 PROCEDURE — G0545: CPT

## 2025-01-09 PROCEDURE — 99291 CRITICAL CARE FIRST HOUR: CPT

## 2025-01-09 PROCEDURE — 99223 1ST HOSP IP/OBS HIGH 75: CPT

## 2025-01-09 PROCEDURE — 99232 SBSQ HOSP IP/OBS MODERATE 35: CPT

## 2025-01-09 RX ORDER — AMIODARONE HYDROCHLORIDE 50 MG/ML
INJECTION, SOLUTION INTRAVENOUS
Refills: 0 | Status: DISCONTINUED | OUTPATIENT
Start: 2025-01-09 | End: 2025-01-27

## 2025-01-09 RX ORDER — LIDOCAINE HYDROCHLORIDE 20 MG/ML
1 JELLY TOPICAL DAILY
Refills: 0 | Status: DISCONTINUED | OUTPATIENT
Start: 2025-01-09 | End: 2025-01-13

## 2025-01-09 RX ORDER — AMIODARONE HYDROCHLORIDE 50 MG/ML
400 INJECTION, SOLUTION INTRAVENOUS EVERY 8 HOURS
Refills: 0 | Status: COMPLETED | OUTPATIENT
Start: 2025-01-09 | End: 2025-01-13

## 2025-01-09 RX ORDER — AMIODARONE HYDROCHLORIDE 50 MG/ML
200 INJECTION, SOLUTION INTRAVENOUS DAILY
Refills: 0 | Status: DISCONTINUED | OUTPATIENT
Start: 2025-01-13 | End: 2025-01-27

## 2025-01-09 RX ORDER — GABAPENTIN 400 MG/1
100 CAPSULE ORAL EVERY 12 HOURS
Refills: 0 | Status: DISCONTINUED | OUTPATIENT
Start: 2025-01-09 | End: 2025-01-31

## 2025-01-09 RX ADMIN — Medication 25 MICROGRAM(S): at 07:30

## 2025-01-09 RX ADMIN — INSULIN LISPRO 4: 100 INJECTION, SOLUTION INTRAVENOUS; SUBCUTANEOUS at 11:27

## 2025-01-09 RX ADMIN — DEXMEDETOMIDINE HYDROCHLORIDE IN SODIUM CHLORIDE 10.6 MICROGRAM(S)/KG/HR: 4 INJECTION INTRAVENOUS at 00:00

## 2025-01-09 RX ADMIN — Medication 25 MICROGRAM(S): at 13:46

## 2025-01-09 RX ADMIN — AMIODARONE HYDROCHLORIDE 16.7 MG/MIN: 50 INJECTION, SOLUTION INTRAVENOUS at 10:01

## 2025-01-09 RX ADMIN — INSULIN LISPRO 2: 100 INJECTION, SOLUTION INTRAVENOUS; SUBCUTANEOUS at 00:02

## 2025-01-09 RX ADMIN — AMIODARONE HYDROCHLORIDE 400 MILLIGRAM(S): 50 INJECTION, SOLUTION INTRAVENOUS at 21:23

## 2025-01-09 RX ADMIN — GABAPENTIN 100 MILLIGRAM(S): 400 CAPSULE ORAL at 17:26

## 2025-01-09 RX ADMIN — IPRATROPIUM BROMIDE AND ALBUTEROL SULFATE 3 MILLILITER(S): .5; 2.5 SOLUTION RESPIRATORY (INHALATION) at 11:40

## 2025-01-09 RX ADMIN — DOBUTAMINE 6.38 MICROGRAM(S)/KG/MIN: 250 INJECTION INTRAVENOUS at 07:44

## 2025-01-09 RX ADMIN — Medication 81 MILLIGRAM(S): at 11:25

## 2025-01-09 RX ADMIN — GABAPENTIN 100 MILLIGRAM(S): 400 CAPSULE ORAL at 05:04

## 2025-01-09 RX ADMIN — Medication 2 TABLET(S): at 21:24

## 2025-01-09 RX ADMIN — IPRATROPIUM BROMIDE AND ALBUTEROL SULFATE 3 MILLILITER(S): .5; 2.5 SOLUTION RESPIRATORY (INHALATION) at 05:10

## 2025-01-09 RX ADMIN — HEPARIN SODIUM 7 UNIT(S)/HR: 1000 INJECTION INTRAVENOUS; SUBCUTANEOUS at 07:46

## 2025-01-09 RX ADMIN — Medication 1 APPLICATION(S): at 05:10

## 2025-01-09 RX ADMIN — POLYETHYLENE GLYCOL 3350 17 GRAM(S): 17 POWDER, FOR SOLUTION ORAL at 17:26

## 2025-01-09 RX ADMIN — MEROPENEM 100 MILLIGRAM(S): 1 INJECTION INTRAVENOUS at 13:06

## 2025-01-09 RX ADMIN — Medication 25 MICROGRAM(S): at 22:19

## 2025-01-09 RX ADMIN — LIDOCAINE HYDROCHLORIDE 2 PATCH: 20 JELLY TOPICAL at 20:13

## 2025-01-09 RX ADMIN — Medication 25 MICROGRAM(S): at 07:45

## 2025-01-09 RX ADMIN — Medication 25 MICROGRAM(S): at 14:00

## 2025-01-09 RX ADMIN — IPRATROPIUM BROMIDE AND ALBUTEROL SULFATE 3 MILLILITER(S): .5; 2.5 SOLUTION RESPIRATORY (INHALATION) at 23:47

## 2025-01-09 RX ADMIN — MEROPENEM 100 MILLIGRAM(S): 1 INJECTION INTRAVENOUS at 21:23

## 2025-01-09 RX ADMIN — MEROPENEM 100 MILLIGRAM(S): 1 INJECTION INTRAVENOUS at 05:04

## 2025-01-09 RX ADMIN — INSULIN LISPRO 4: 100 INJECTION, SOLUTION INTRAVENOUS; SUBCUTANEOUS at 17:25

## 2025-01-09 RX ADMIN — LIDOCAINE HYDROCHLORIDE 2 PATCH: 20 JELLY TOPICAL at 11:26

## 2025-01-09 RX ADMIN — AMIODARONE HYDROCHLORIDE 400 MILLIGRAM(S): 50 INJECTION, SOLUTION INTRAVENOUS at 13:07

## 2025-01-09 RX ADMIN — Medication 25 MICROGRAM(S): at 21:49

## 2025-01-09 RX ADMIN — Medication 40 MILLIGRAM(S): at 11:25

## 2025-01-09 RX ADMIN — Medication 1 APPLICATION(S): at 22:48

## 2025-01-09 RX ADMIN — BUDESONIDE 0.25 MILLIGRAM(S): 0.25 SUSPENSION RESPIRATORY (INHALATION) at 05:10

## 2025-01-09 RX ADMIN — Medication 1 TABLET(S): at 11:26

## 2025-01-09 NOTE — PROGRESS NOTE ADULT - SUBJECTIVE AND OBJECTIVE BOX
Patient seen and examined at the bedside.    Remained critically ill on continuous ICU monitoring.    OBJECTIVE:  Vital Signs Last 24 Hrs  T(C): 36.7 (09 Jan 2025 18:15), Max: 37.4 (08 Jan 2025 20:00)  T(F): 98.1 (09 Jan 2025 18:15), Max: 99.3 (08 Jan 2025 20:00)  HR: 104 (09 Jan 2025 18:45) (83 - 118)  BP: 83/54 (09 Jan 2025 18:30) (83/54 - 94/53)  BP(mean): 65 (09 Jan 2025 18:30) (65 - 66)  RR: 19 (09 Jan 2025 18:45) (6 - 28)  SpO2: 100% (09 Jan 2025 18:45) (76% - 100%)    Parameters below as of 09 Jan 2025 18:15  Patient On (Oxygen Delivery Method): nasal cannula, high flow      Physical Exam:   General: NAD   Neurology: Alert/ oriented. follows commands, moves all extremities  Respiratory: Clear bilaterally   CV: SR  Abdominal: Soft, Nontender  Extremities: Warm, well-perfused                   Assessment:  54M with PMH of HTN, HLD, ESRD on HD MWF via LUE AVF, ascites (requiring repeat paracenteses q4-6wks), NICM with moderate LV dysfunction w/ EF 35-40%(2023) and CAD s/p atherectomy + SALLIE to D1(4/11/2024) presents to [facility] transferred from [facility].     s/p Cholecystectomy on 11/29/24  Multi-CAD s/p C3L on 12/30/24  Hypovolemia  Post op respiratory insufficiency  Acute blood loss anemia  Thrombocytopenia      Plan:   ***Neuro***  [x] Sedated with Precedex   PRNs for pain management     ***Cardiovascular***  SR/ MAP 57/ CVP 10/  Hct 26.7 / Lact 1.4  [x] Dobutamine 5 mcg/kg/min   [x] Vasopressin 0.03 units/min   Amiodarone infusion for a fib - transition to PO once extubated   Alex weaned off 1/7  [x] ASA / Plavix in setting of thrombocytopenia   Colchicine for pericarditis   Heparin for AC therapy   Vascular surgery consulted for fistula evaluation      ***Pulmonary***  [x] HFNC 30L/40%  Encourage incentive spirometry, continue pulse ox monitoring, follow ABGs     ***GI***  Tube feeds @ goal   Protonix for stress ulcer prophylaxis.   Bowel regimen with Miralax and Senna    ***Renal***  hx of ESRD iHD requiring CRRT - consider transitioning if remains off vasoactive medications.  LUE AV fistula   Plan for even to slightly negative today     ***ID***  Sepsis/septic shock due to ESBL E. coli - Continue meropenem, ongoing fevers   1/7 Bcx pending   1/5 Blood cultures negative to date  1/3 Bronch ESBL E Coli   1/2 Bcx ESBL E coli   Trend leukocytosis.  Surgical chest wall wound well appearing - no active infection     ***Endocrine***  [x] Stress Hyperglycemia : HbA1c 5.6%                - [x] ISS             - Need tight glycemic control to prevent wound infection.      Patient requires continuous monitoring with bedside rhythm monitoring, pulse oximetry monitoring, and continuous central venous and arterial pressure monitoring; and intermittent blood gas analysis. Care plan discussed with the ICU care team.   Patient remained critical, at risk for life threatening decompensation.    I have spent 50 minutes providing critical care management to this patient.    By signing my name below, I, Sonia Sánchez, attest that this documentation has been prepared under the direction and in the presence of Foster Marquez NP.   Electronically signed: Autumn Gomez, 01-09-25 @ 19:33    I, Foster Marquez , personally performed the services described in this documentation. all medical record entries made by the autumn were at my direction and in my presence. I have reviewed the chart and agree that the record reflects my personal performance and is accurate and complete  Electronically signed: Foster Marquez NP. Patient seen and examined at the bedside.    Remained critically ill on continuous ICU monitoring.    OBJECTIVE:  Vital Signs Last 24 Hrs  T(C): 36.7 (09 Jan 2025 18:15), Max: 37.4 (08 Jan 2025 20:00)  T(F): 98.1 (09 Jan 2025 18:15), Max: 99.3 (08 Jan 2025 20:00)  HR: 104 (09 Jan 2025 18:45) (83 - 118)  BP: 83/54 (09 Jan 2025 18:30) (83/54 - 94/53)  BP(mean): 65 (09 Jan 2025 18:30) (65 - 66)  RR: 19 (09 Jan 2025 18:45) (6 - 28)  SpO2: 100% (09 Jan 2025 18:45) (76% - 100%)    Parameters below as of 09 Jan 2025 18:15  Patient On (Oxygen Delivery Method): nasal cannula, high flow      Physical Exam:   General: NAD   Neurology: Alert/ oriented. follows commands, moves all extremities  Respiratory: Clear bilaterally   CV: SR  Abdominal: Soft, Nontender  Extremities: Warm, well-perfused                   Assessment:  54M with PMH of HTN, HLD, ESRD on HD MWF via LUE AVF, ascites (requiring repeat paracenteses q4-6wks), NICM with moderate LV dysfunction w/ EF 35-40%(2023) and CAD s/p atherectomy + SALLIE to D1(4/11/2024) presents to [facility] transferred from [facility].     s/p Cholecystectomy on 11/29/24  Multi-CAD s/p C3L on 12/30/24  Hypovolemia  Post op respiratory insufficiency  Acute blood loss anemia  Thrombocytopenia      Plan:   ***Neuro***   PRNs for pain management     ***Cardiovascular***  SR/ MAP 57/ CVP 10/  Hct 26.7 / Lact 1.4  [x] Dobutamine 5 mcg/kg/min   [x] Vasopressin 0.01 units/min   Amiodarone infusion for a fib - transition to PO once extubated   Alex weaned off 1/7  [x] ASA / Plavix in setting of thrombocytopenia   Colchicine for pericarditis   Heparin for AC therapy   Vascular surgery consulted for fistula evaluation      ***Pulmonary***  [x] HFNC 30L/40%  Encourage incentive spirometry, continue pulse ox monitoring, follow ABGs     ***GI***  Tube feeds @ goal   Protonix for stress ulcer prophylaxis.   Bowel regimen with Miralax and Senna    ***Renal***  hx of ESRD iHD requiring CRRT - consider transitioning if remains off vasoactive medications.  LUE AV fistula   Plan for even to slightly negative today     ***ID***  Sepsis/septic shock due to ESBL E. coli - Continue meropenem, ongoing fevers   1/7 Bcx pending   1/5 Blood cultures negative to date  1/3 Bronch ESBL E Coli   1/2 Bcx ESBL E coli   Trend leukocytosis.    ***Endocrine***  [x] Stress Hyperglycemia : HbA1c 5.6%                - [x] ISS             - Need tight glycemic control to prevent wound infection.      Patient requires continuous monitoring with bedside rhythm monitoring, pulse oximetry monitoring, and continuous central venous and arterial pressure monitoring; and intermittent blood gas analysis. Care plan discussed with the ICU care team.   Patient remained critical, at risk for life threatening decompensation.    I have spent 50 minutes providing critical care management to this patient.    By signing my name below, I, Sonia Sánchez, attest that this documentation has been prepared under the direction and in the presence of Foster Marquez NP.   Electronically signed: Evelio Gomez, 01-09-25 @ 19:33    I, Foster Marquez , personally performed the services described in this documentation. all medical record entries made by the shubhamibdarlene were at my direction and in my presence. I have reviewed the chart and agree that the record reflects my personal performance and is accurate and complete  Electronically signed: Foster Marquez NP.

## 2025-01-09 NOTE — PHYSICAL THERAPY INITIAL EVALUATION ADULT - GAIT DEVIATIONS NOTED, PT EVAL
decreased spike/decreased step length/decreased weight-shifting ability
decreased spike/increased time in double stance/decreased step length/decreased stride length/increased stride width/decreased weight-shifting ability

## 2025-01-09 NOTE — PROGRESS NOTE ADULT - SUBJECTIVE AND OBJECTIVE BOX
Seen and examined.   Extubated overnight to HFNC.     Physical exam:  General; NAD  Respiratory: on HF, nonlabored.   Abdomen: soft, ND, NT. No rebound or guarding  Extremities: WWP

## 2025-01-09 NOTE — PHYSICAL THERAPY INITIAL EVALUATION ADULT - BALANCE TRAINING, PT EVAL
GOAL: Patient will improve static and dynamic standing balance by 1/2 grade using least restrictive device within 2 weeks to improve safety and decrease risk of falls.
GOAL: Patient will improve static and dynamic standing balance by 1/2 grade using least restrictive device within 2 weeks to improve safety and decrease risk of falls.

## 2025-01-09 NOTE — PROGRESS NOTE ADULT - ASSESSMENT
54M with PMH of HTN, HLD, ESRD on HD MWF via LUE AVF, ascites (requiring repeat paracenteses q4-6wks), NICM with moderate LV dysfunction w/ EF 35-40%() and CAD s/p atherectomy + SALLIE to D1(2024) presents to [facility] transferred from [facility]. Patient initially presented to [facility] ED with shortness of breath. Of note he was recently admitted from - for acute cholecystitis s/p cholecystectomy. In [facility] ED, pt was hypoxic to 86% on RA, trop 1.7K, EKG no new changes, CXR fluid overload. Admitted for AHRF 2/2 fluid overload. Pt received HD on , ,  and . DAPT was resumed, TTE showed improvement in LVEF to 40-45%. Stress test was abnormal with 1mm inferior STD, reversible ischemia in the inferior wall and fixed defects in the lateral, anterior and apical walls with post stress EF of 24%. Pt was then transferred to [facility] for cardiac cath which showed pLAD 70% ISR, mLCx 70%, D1 severe dz. Patient now transferred to [facility] CTS Dr. Rivers for CABG eval. Patient denies any current SOB or chest pain on admission. Otherwise denies headaches, abdominal pain, urinary or bowel changes, fevers, chills, N/V/D, sick contacts.  (24 Dec 2024 05:07)   VSS  CP free   HD via avf  for daily HD pre op- on lovenox 30 qd    HD today continue with present meds. Plan for CABG possibleTVR next week   vss; rsr 55-80; hd today w/ 1 unit prbc; pt to be dialzyed also this  w/ another 1 unit prbc   VSS; RSR 60-80; continue asa/bb/ statin; HD in am - transfuse 1 unit prbc with HD; d/c lovenox after am dose sun    - Pt underwent  C3L free LIMA-LAD; SVG-Diag; SVG-leftPL  Pt given cefuroxime perioperatively   pt extubated   . pt with hypotension and ECHO showing Systolic HF/depressed BIV Function, currently supported with /pressors.  Labs this evening showing transaminitis  1/2 -3 pt hypotensive, febrile and reintubated  Pt pancultured. and started on zosyn- meropenem and gent  pt found to have Gram negative bacteremia    E coli +ESBL bacteremia        A/P  #sepsis  ? source - many options. Pt with abnormal u/a but is a dialysis pt so hard to interpret. Pt with h//o SBP- pt with ascites   s/p  paracentesis 1/3  cultures No Growth to date  - no organisms notes on gram stain  could be from lungs   Continue meropenem  lines  were  changed, to d/c shiley today   if fevers persist/recurs , please obtain CT of head ( sinuses) chest and abdomen .Pt with recent cholecystectomy, pt with h/o SBP, pt with large secretions.   check c diff if develops diarrhea  may need to repeat paracentesis if continued leucocytosis  check bladder scan       #elevated LFTs  likely shock liver  acute  hepatitis serology- negative   trend  avoid hepatotoxic meds    improving daily         #thrombocytopenia   HIt ab negative  Likely from sepsis vs from IABP or drugs   trend   heme input if no improvement    #Renal failure  going back to Hemodialysis  please change the meropenem to daily dosing         Marla Champion M.D. ,   please reach via teams   If no answer, or after 5PM/ weekends,  then please call  [phone]    Assessment and plan discussed with the primary team .

## 2025-01-09 NOTE — PHYSICAL THERAPY INITIAL EVALUATION ADULT - GAIT TRAINING, PT EVAL
GOAL: Pt will independently ambulate x400 ft using least restrictive device without loss of balance, within 2 weeks.
GOAL: Pt will independently ambulate x400 ft using least restrictive device without loss of balance, within 2 weeks.

## 2025-01-09 NOTE — PHYSICAL THERAPY INITIAL EVALUATION ADULT - GENERAL OBSERVATIONS, REHAB EVAL
Pt received in sitting in NAD, (+)ICU monitoring, tele, CT to suction, external pacer, A line, IJ central line, 3L NC, pulse ox, blunt, sternal dressings intact, A&Ox4, VS screened and stable.
Patient received semi-reclined in bed with HFNC ( 40 L; 40% FiO2), RIJ TLC, afia, PIV infusing, R fem vascath, NGT and ICU monitoring.

## 2025-01-09 NOTE — PHYSICAL THERAPY INITIAL EVALUATION ADULT - PERTINENT HX OF CURRENT PROBLEM, REHAB EVAL
Pt is a 54 yr old male HTN, HLD, ESRD on HD MWF via LUE AVF / ascites (requiring repeat paracenteses q4-6wks) / NICM with moderate LV dysfunction w/ EF 35-40%(2023) and CAD s/p atherectomy + SALLIE to D1(4/11/2024). CAD with instent restenosis of LAD with D1 and LCx disease s/p CABG x 3 with free LIMA-LAD, vein to diag and Left PL. On 1/2 Intubated for hypoxia & left lung white out s/p awake bronch with removal of copious mucopurulent secretions; 1/4 s/p IABP insertion, sepsis/septic shock due to E col

## 2025-01-09 NOTE — PROGRESS NOTE ADULT - SUBJECTIVE AND OBJECTIVE BOX
MR#[id number]  PATIENT NAME:RAAD CANO    DATE OF SERVICE: 25 @ 07:22  Patient was seen and examined by Solo Quick MD on    25 @ 07:22 .  Interim events noted.Consultant notes ,Labs,Telemetry reviewed by me       HOSPITAL COURSE: HPI:  54M with PMH of HTN, HLD, ESRD on HD MWF via LUE AVF, ascites (requiring repeat paracenteses q4-6wks), NICM with moderate LV dysfunction w/ EF 35-40%() and CAD s/p atherectomy + SALLIE to D1(2024) presents to [facility] transferred from [facility]. Patient initially presented to [facility] ED with shortness of breath. Of note he was recently admitted from - for acute cholecystitis s/p cholecystectomy. In [facility] ED, pt was hypoxic to 86% on RA, trop 1.7K, EKG no new changes, CXR fluid overload. Admitted for AHRF 2/2 fluid overload. Pt received HD on , ,  and . DAPT was resumed, TTE showed improvement in LVEF to 40-45%. Stress test was abnormal with 1mm inferior STD, reversible ischemia in the inferior wall and fixed defects in the lateral, anterior and apical walls with post stress EF of 24%. Pt was then transferred to [facility] for cardiac cath which showed pLAD 70% ISR, mLCx 70%, D1 severe dz. Patient now transferred to [facility] CTS Dr. Rivers for CABG eval. Patient denies any current SOB or chest pain on admission. Otherwise denies headaches, abdominal pain, urinary or bowel changes, fevers, chills, N/V/D, sick contacts.  (24 Dec 2024 05:07)      NTERIM EVENTS:Patient seen at bedside ,interim events noted.   Cardiac cath  pLAD 70% ISR, mLCx 70%, D1 severe dz.   -POD#1-C3L free LIMA-LAD; SVG-Diag; SVG-leftPL Awake OOB extubated Sinus rhythm on Dobutamine gtt  - Was intubated last night for airway protection s/p bronchoscopy This morning had IABP inserted  remains sedated intubated Sinus Rhythm  01/04- s/p IABP insertion, sepsis/septic shock due to GNR/ECOLI -Paracentesis for suspected bacterial peritonitis  Transfused 1u pbrcs overnight  Remains on inoptropes and pressors with IABP at 1:1  -Intunated on IABP 1:1,Alex@20ppm,on ,weaning pressors  -POD#7-Remains intubated on CRRT-IABP 1:3,off pressors on Dobutrex in Atrial Fibrillation  -Intibated on Alex 10ppm,IABP dce'd,off Levophed,remains on Dobutrex-Atrial Fibrillation  -Remains intubated had bronch earlier for cupious secretions-On Dobutrex,AF~~90's On CRRT  -Extubated on HFNC,no dyspnea alert remains in AF        MEDICATIONS  (STANDING):  albuterol/ipratropium for Nebulization 3 milliLiter(s) Nebulizer every 6 hours  aMIOdarone Infusion 0.5 mG/Min (16.7 mL/Hr) IV Continuous <Continuous>  aspirin  chewable 81 milliGRAM(s) Oral daily  bisacodyl Suppository 10 milliGRAM(s) Rectal once  buDESOnide    Inhalation Suspension 0.25 milliGRAM(s) Inhalation every 12 hours  chlorhexidine 2% Cloths 1 Application(s) Topical daily  chlorhexidine 4% Liquid 1 Application(s) Topical <User Schedule>  colchicine 0.3 milliGRAM(s) Oral daily  CRRT Treatment    <Continuous>  dexMEDEtomidine Infusion 0.5 MICROgram(s)/kG/Hr (10.6 mL/Hr) IV Continuous <Continuous>  dextrose 50% Injectable 50 milliLiter(s) IV Push every 15 minutes  dextrose 50% Injectable 25 milliLiter(s) IV Push every 15 minutes  DOBUTamine Infusion 5 MICROgram(s)/kG/Min (6.38 mL/Hr) IV Continuous <Continuous>  epoetin ignacia (EPOGEN) Injectable 27215 Unit(s) IV Push <User Schedule>  gabapentin 100 milliGRAM(s) Oral every 12 hours  heparin  Infusion 300 Unit(s)/Hr (7 mL/Hr) IV Continuous <Continuous>  insulin lispro (ADMELOG) corrective regimen sliding scale   SubCutaneous every 6 hours  lidocaine   4% Patch 2 Patch Transdermal daily  meropenem  IVPB 1000 milliGRAM(s) IV Intermittent every 8 hours  Nephro-elicia 1 Tablet(s) Oral daily  pantoprazole  Injectable 40 milliGRAM(s) IV Push daily  Phoxillum Filtration BK 4 / 2.5 5000 milliLiter(s) (800 mL/Hr) CRRT <Continuous>  Phoxillum Filtration BK 4 / 2.5 5000 milliLiter(s) (200 mL/Hr) CRRT <Continuous>  polyethylene glycol 3350 17 Gram(s) Oral two times a day  PrismaSATE Dialysate BGK 4 / 2.5 5000 milliLiter(s) (1000 mL/Hr) CRRT <Continuous>  senna 2 Tablet(s) Oral at bedtime  sodium chloride 0.9%. 1000 milliLiter(s) (10 mL/Hr) IV Continuous <Continuous>  vasopressin Infusion 0.03 Unit(s)/Min (4.5 mL/Hr) IV Continuous <Continuous>    MEDICATIONS  (PRN):  fentaNYL    Injectable 12.5 MICROGram(s) IV Push every 2 hours PRN Moderate Pain (4 - 6)  fentaNYL    Injectable 25 MICROGram(s) IV Push every 3 hours PRN Severe Pain (7 - 10)  sodium chloride 0.9% lock flush 10 milliLiter(s) IV Push every 1 hour PRN Pre/post blood products, medications, blood draw, and to maintain line patency            REVIEW OF SYSTEMS:  Constitutional: [ ] fever, [ ]weight loss,  [ x]fatigue [ ]weight gain  Eyes: [ ] visual changes  Respiratory: [ ]shortness of breath;  [ ] cough, [ ]wheezing, [ ]chills, [ ]hemoptysis  Cardiovascular: [ ] chest pain, [ ]palpitations, [ ]dizziness,  [ ]leg swelling[ ]orthopnea[ ]PND  Gastrointestinal: [ ] abdominal pain, [ ]nausea, [ ]vomiting,  [ ]diarrhea [ ]Constipation [ ]Melena  Genitourinary: [ ] dysuria, [ ] hematuria [ ]Vigil  Neurologic: [ ] headaches [ ] tremors[ ]weakness [ ]Paralysis Right[ ] Left[ ]  Skin: [ ] itching, [ ]burning, [ ] rashes  Endocrine: [ ] heat or cold intolerance  Musculoskeletal: [ ] joint pain or swelling; [ ] muscle, back, or extremity pain  Psychiatric: [ ] depression, [ ]anxiety, [ ]mood swings, or [ ]difficulty sleeping  Hematologic: [ ] easy bruising, [ ] bleeding gums    [ ] All remaining systems negative except as per above.   [ ]Unable to obtain.  [x] No change in ROS since admission      Vital Signs Last 24 Hrs  T(C): 37.2 (2025 04:00), Max: 37.4 (2025 20:00)  T(F): 99 (2025 04:00), Max: 99.3 (2025 20:00)  HR: 95 (2025 07:00) (76 - 118)  BP: --142/47  RR: 10 (2025 07:00) (6 - 33)  SpO2: 100% (2025 07:00) (90% - 100%)    Parameters below as of 2025 05:40  Patient On (Oxygen Delivery Method): nasal cannula, high flow      I&O's Summary    2025 07:01  -  2025 07:00  --------------------------------------------------------  IN: 2056.6 mL / OUT: 3027 mL / NET: -970.4 mL        PHYSICAL EXAM:  General: No acute distress BMI-28  HEENT: EOMI, PERRL  Neck: Supple, [ ] JVD  Lungs: Equal air entry bilaterally; [ ] rales [ ] wheezing [ ] rhonchi  Heart: Regular rate and rhythm; [x ] murmur   2/6 [ x] systolic [ ] diastolic [ ] radiation[ ] rubs [ ]  gallops  Abdomen: Nontender, bowel sounds present  Extremities: No clubbing, cyanosis, [ ] edema [ ]Pulses  equal and intact  Nervous system:  Alert & Oriented X3, no focal deficits  Psychiatric: Normal affect  Skin: No rashes or lesions    LABS:      135  |  103  |  32[H]  ----------------------------<  202[H]  3.7   |  19[L]  |  1.99[H]    Ca    8.0[L]      2025 00:31  Phos  2.6       Mg     2.4         TPro  5.7[L]  /  Alb  2.8[L]  /  TBili  1.5[H]  /  DBili  x   /  AST  68[H]  /  ALT  171[H]  /  AlkPhos  114  -AST  97[H]  /  ALT  240[H]      Creatinine Trend: 1.99<--, 2.00<--, 2.17<--, 2.45<--, 3.23<--, 3.81<--                        8.3    27.85 )-----------( 82       ( 2025 00:31 )             26.7     PT/INR - ( 2025 20:56 )   PT: 16.3 sec;   INR: 1.43 ratio         PTT - ( 2025 03:36 )  PTT:31.2 sec      Culture - Blood (25 @ 14:08)   Specimen Source: .Blood BLOOD  Culture Results: No growth at 24 hours       Xray Chest 1 View- PORTABLE-Urgent (Xray Chest 1 View- PORTABLE-Urgent .) (25 @ 07:06) >  IMPRESSION:  On the final image of the series, the left IJ approach central venous  catheter tip terminates in the left brachiocephalic vein.  Endotracheal tube with tip above the ema.  Enteric tubes descend below level of diaphragm and out of the  field-of-view.  Small left pleural effusion with associated atelectasis. Right lung is  clear.       12 Lead ECG (25 @ 00:21) >   ATRIAL FIBRILLATION  LEFT AXIS DEVIATION  NON-SPECIFIC INTRA-VENTRICULAR CONDUCTIONBLOCK  MINIMAL VOLTAGE CRITERIA FOR LVH, MAY BE NORMAL VARIANT ( Michael product )  INFERIOR INFARCT , AGE UNDETERMINED  ABNORMAL ECG        TTE W or WO Ultrasound Enhancing Agent (25 @ 09:59) >  CONCLUSIONS:      1. Left ventricular systolic function is moderately decreased with an ejection fraction visually estimated at 40 %.   2. There is hypokinesis of the inferior and inferolateral walls.   3. Enlarged right ventricular cavity size and reduced right ventricular systolic function.   4.Pericardium:-There ira trace pericardial effusion noted adjacent to the posterior left ventricle.

## 2025-01-09 NOTE — CONSULT NOTE ADULT - SUBJECTIVE AND OBJECTIVE BOX
55 y/o male with PMHx of hypertension, ESRD on HD, ascites, non-      54M with PMH of HTN, HLD, ESRD on HD MWF via LUE AVF, ascites (requiring repeat paracenteses q4-6wks), NICM with moderate LV dysfunction w/ EF 35-40%() and CAD s/p atherectomy + SALLIE to D1(2024) presents to [facility] transferred from [facility]. Patient initially presented to [facility] ED with shortness of breath. Of note he was recently admitted from - for acute cholecystitis s/p cholecystectomy. In [facility] ED, pt was hypoxic to 86% on RA, trop 1.7K, EKG no new changes, CXR fluid overload. Admitted for AHRF 2/2 fluid overload. Pt received HD on , ,  and . DAPT was resumed, TTE showed improvement in LVEF to 40-45%. Stress test was abnormal with 1mm inferior STD, reversible ischemia in the inferior wall and fixed defects in the lateral, anterior and apical walls with post stress EF of 24%. Pt was then transferred to [facility] for cardiac cath which showed pLAD 70% ISR, mLCx 70%, D1 severe dz. Patient now transferred to [facility] CTS Dr. Rivers for CABG eval. Patient denies any current SOB or chest pain on admission. Otherwise denies headaches, abdominal pain, urinary or bowel changes, fevers, chills, N/V/D, sick contacts.  (24 Dec 2024 05:07)      PAST MEDICAL & SURGICAL HISTORY:  Type 2 diabetes mellitus  Hypertension  End stage renal disease  started HD 2019 T, Th, Sat via right chest permacath  Bone spur  right shoulder- hx of - sx done  Anemia  Injury of right wrist  hx of at age 14  History of hyperkalemia  before HD- K-6.2 - to repeat in am of sx, pt had HD today  S/P arthroscopy of right shoulder    History of vascular access device  right chest permacath - 2019  H/O right wrist surgery  tendons repair - at age 14  AV fistula  H/O ventral hernia repair  S/P cholecystectomy    FAMILY HISTORY: relevant information in HPI    SOCIAL HISTORY:  relevant information in HPI    MEDICATIONS:  aMIOdarone    Tablet   Oral   aMIOdarone    Tablet 400 milliGRAM(s) Oral every 8 hours  aMIOdarone Infusion 0.5 mG/Min IV Continuous <Continuous>  aspirin  chewable 81 milliGRAM(s) Oral daily  DOBUTamine Infusion 5 MICROgram(s)/kG/Min IV Continuous <Continuous>  heparin  Infusion 300 Unit(s)/Hr IV Continuous <Continuous>    meropenem  IVPB 1000 milliGRAM(s) IV Intermittent every 8 hours    albuterol/ipratropium for Nebulization 3 milliLiter(s) Nebulizer every 6 hours  buDESOnide    Inhalation Suspension 0.25 milliGRAM(s) Inhalation every 12 hours    dexMEDEtomidine Infusion 0.5 MICROgram(s)/kG/Hr IV Continuous <Continuous>  fentaNYL    Injectable 12.5 MICROGram(s) IV Push every 2 hours PRN  fentaNYL    Injectable 25 MICROGram(s) IV Push every 3 hours PRN  gabapentin 100 milliGRAM(s) Oral every 12 hours    bisacodyl Suppository 10 milliGRAM(s) Rectal once  pantoprazole  Injectable 40 milliGRAM(s) IV Push daily  polyethylene glycol 3350 17 Gram(s) Oral two times a day  senna 2 Tablet(s) Oral at bedtime    dextrose 50% Injectable 50 milliLiter(s) IV Push every 15 minutes  dextrose 50% Injectable 25 milliLiter(s) IV Push every 15 minutes  insulin lispro (ADMELOG) corrective regimen sliding scale   SubCutaneous every 6 hours  vasopressin Infusion 0.03 Unit(s)/Min IV Continuous <Continuous>    chlorhexidine 2% Cloths 1 Application(s) Topical daily  chlorhexidine 4% Liquid 1 Application(s) Topical <User Schedule>  epoetin ignacia (EPOGEN) Injectable 21531 Unit(s) IV Push <User Schedule>  lidocaine   4% Patch 2 Patch Transdermal daily  Nephro-elicia 1 Tablet(s) Oral daily  sodium chloride 0.9% lock flush 10 milliLiter(s) IV Push every 1 hour PRN  sodium chloride 0.9%. 1000 milliLiter(s) IV Continuous <Continuous>      -------------------------------------------------------------------------------------------  PHYSICAL EXAM:  T(C): 37.2 (25 @ 12:00), Max: 37.4 (25 @ 20:00)  HR: 106 (25 @ 12:00) (83 - 118)  BP: --  RR: 16 (25 @ 12:00) (6 - 31)  SpO2: 100% (25 @ 12:00) (90% - 100%)    GENERAL: no acute distress  NECK: JVP is   CHEST/LUNG: clear to auscultation bilaterally, unlabored breathing  HEART: regular rate and rhythm, no murmurs or gallops.  ABDOMEN: soft, non-tender, non-distended, bowel sounds present  EXTREMITIES:  warm, no LE edema, 2+ DP pulses    -------------------------------------------------------------------------------------------  RELEVANT LABS:    -------------------------------------------------------------------------------------------  RELEVANT IMAGING:    EC/4/25: sinus rhythm with incomplete RBBB and LAD  25: sinus rhythm with incomplete RBBB and LAD  25: Afib with incomplete RBBB and LAD, HR 100s    Echo:     25:   1. Left ventricular systolic function is severely decreased with an ejection fraction of 35 % by Michaels's method of disks. Global left ventricular hypokinesis.   2. Enlarged right ventricular cavity size and reduced right ventricular systolic function.   3. Trace pericardial effusion with no echocardiographic evidence of tamponade physiology.    25:  CONCLUSIONS:      1. Left ventricular systolic function is moderately decreased with an ejection fraction visually estimated at 40 %.   2. There is hypokinesis of the inferior and inferolateral walls.   3. Enlarged right ventricular cavity size and reduced right ventricular systolic function.    1/3/25:  CONCLUSIONS:      1. Left ventricular systolic function is moderately to severely decreased with an ejection fraction of 35 % by 3D. Global left ventricular hypokinesis.   2. Enlarged right ventricular cavity size and moderately to severely reduced right ventricular systolic function.   3. Severe tricuspid regurgitation.   4. Compared to the transthoracic echocardiogram performed on 1/3/2025, there have been no significant interval changes.    Stress Testin/20/24:   1. Myocardial Perfusion: Abnormal.   2. Baseline electrocardiogram: normal sinus rhythm at a rate of 66 bpm with q anterior leads,T wave inversion lateral leads.   3. Electrocardiogram ischemic changes: 1 mm horizontal st-t depression in inferior leads which started 1 minute into stress test and persisted 4 minutes into recovery.   4. Qualitative Perfusion:      - medium-sized, severe defect(s) in the inferior wall that is reversible suggestive of ischemia. - medium-sized, severe defect(s) in the lateral wall that is fixed suggestive of an infarction. - medium-sized, severe defect(s) in the anterior and apical wall that is fixed suggestive of an infarction.   5. The left ventricle is severely reduced in function and moderately enlarged in size. The post stress left ventricular EF is 24 %. The stress end diastolic volume is 213 ml and systolic volume is 162 ml.   6. The inferior, anterior and lateral walls are hypokinetic.   7. The above finding were discussed with  at 12:07pm.    Cath:    24:  The coronary circulation is right dominant. Cardiac catheterization  was performed electively.    LM Left main artery: Angiography shows minor irregularities.      LAD Proximal left anterior descending: There is a 70 % stenosis in the  distal third portion of the segment at branch to D1 and within  the stented segment. AUSTYN Flow 3.    First diagonal: There is a 95 % stenosis in the ostium portion of the  segment within the stented segment. AUSTYN Flow 3.    CX Mid circumflex: There is an 80 % stenosis in the middle third portion  of the segment within the stented segment. AUSTYN Flow 3.    RCA Mid right coronary artery: There is a 20 % stenosis in the proximal  third portion of the segment.  Right posterior descending artery: Angiography shows mild  atherosclerosis.  Right Posterolateral Segment: The segment is small. Angiography shows  moderate atherosclerosis.    Left Heart Cath Left ventricular function was not assessed.        CABG (24);  Coronary artery bypass grafting x3 with a free mammary to the LAD, saphenous vein graft to the diagonal branch and saphenous vein graft to the circumflex marginal branch.    (LIMA - LAD | SVG - D1 | SVG - OM)     -------------------------------------------------------------------------------------------                 53 y/o male with PMHx of hypertension, ESRD on HD, CAD (s/p       54M with PMH of HTN, HLD, ESRD on HD MWF via LUE AVF, ascites (requiring repeat paracenteses q4-6wks), NICM with moderate LV dysfunction w/ EF 35-40%() and CAD s/p atherectomy + SALLIE to D1(2024) presents to [facility] transferred from [facility]. Patient initially presented to [facility] ED with shortness of breath. Of note he was recently admitted from - for acute cholecystitis s/p cholecystectomy. In [facility] ED, pt was hypoxic to 86% on RA, trop 1.7K, EKG no new changes, CXR fluid overload. Admitted for AHRF 2/2 fluid overload. Pt received HD on , ,  and . DAPT was resumed, TTE showed improvement in LVEF to 40-45%. Stress test was abnormal with 1mm inferior STD, reversible ischemia in the inferior wall and fixed defects in the lateral, anterior and apical walls with post stress EF of 24%. Pt was then transferred to [facility] for cardiac cath which showed pLAD 70% ISR, mLCx 70%, D1 severe dz. Patient now transferred to [facility] CTS Dr. Rivers for CABG eval. Patient denies any current SOB or chest pain on admission. Otherwise denies headaches, abdominal pain, urinary or bowel changes, fevers, chills, N/V/D, sick contacts.  (24 Dec 2024 05:07)      PAST MEDICAL & SURGICAL HISTORY:  Type 2 diabetes mellitus  Hypertension  End stage renal disease  started HD 2019 T, Th, Sat via right chest permacath  Bone spur  right shoulder- hx of - sx done  Anemia  Injury of right wrist  hx of at age 14  History of hyperkalemia  before HD- K-6.2 - to repeat in am of sx, pt had HD today  S/P arthroscopy of right shoulder    History of vascular access device  right chest permacath - 2019  H/O right wrist surgery  tendons repair - at age 14  AV fistula  H/O ventral hernia repair  S/P cholecystectomy    FAMILY HISTORY: relevant information in HPI    SOCIAL HISTORY:  relevant information in HPI    MEDICATIONS:  aMIOdarone    Tablet   Oral   aMIOdarone    Tablet 400 milliGRAM(s) Oral every 8 hours  aMIOdarone Infusion 0.5 mG/Min IV Continuous <Continuous>  aspirin  chewable 81 milliGRAM(s) Oral daily  DOBUTamine Infusion 5 MICROgram(s)/kG/Min IV Continuous <Continuous>  heparin  Infusion 300 Unit(s)/Hr IV Continuous <Continuous>    meropenem  IVPB 1000 milliGRAM(s) IV Intermittent every 8 hours    albuterol/ipratropium for Nebulization 3 milliLiter(s) Nebulizer every 6 hours  buDESOnide    Inhalation Suspension 0.25 milliGRAM(s) Inhalation every 12 hours    dexMEDEtomidine Infusion 0.5 MICROgram(s)/kG/Hr IV Continuous <Continuous>  fentaNYL    Injectable 12.5 MICROGram(s) IV Push every 2 hours PRN  fentaNYL    Injectable 25 MICROGram(s) IV Push every 3 hours PRN  gabapentin 100 milliGRAM(s) Oral every 12 hours    bisacodyl Suppository 10 milliGRAM(s) Rectal once  pantoprazole  Injectable 40 milliGRAM(s) IV Push daily  polyethylene glycol 3350 17 Gram(s) Oral two times a day  senna 2 Tablet(s) Oral at bedtime    dextrose 50% Injectable 50 milliLiter(s) IV Push every 15 minutes  dextrose 50% Injectable 25 milliLiter(s) IV Push every 15 minutes  insulin lispro (ADMELOG) corrective regimen sliding scale   SubCutaneous every 6 hours  vasopressin Infusion 0.03 Unit(s)/Min IV Continuous <Continuous>    chlorhexidine 2% Cloths 1 Application(s) Topical daily  chlorhexidine 4% Liquid 1 Application(s) Topical <User Schedule>  epoetin ignacia (EPOGEN) Injectable 62982 Unit(s) IV Push <User Schedule>  lidocaine   4% Patch 2 Patch Transdermal daily  Nephro-elicia 1 Tablet(s) Oral daily  sodium chloride 0.9% lock flush 10 milliLiter(s) IV Push every 1 hour PRN  sodium chloride 0.9%. 1000 milliLiter(s) IV Continuous <Continuous>      -------------------------------------------------------------------------------------------  PHYSICAL EXAM:  T(C): 37.2 (25 @ 12:00), Max: 37.4 (25 @ 20:00)  HR: 106 (25 @ 12:00) (83 - 118)  BP: --  RR: 16 (25 @ 12:00) (6 - 31)  SpO2: 100% (25 @ 12:00) (90% - 100%)    GENERAL: no acute distress  NECK: JVP is   CHEST/LUNG: clear to auscultation bilaterally, unlabored breathing  HEART: regular rate and rhythm, no murmurs or gallops.  ABDOMEN: soft, non-tender, non-distended, bowel sounds present  EXTREMITIES:  warm, no LE edema, 2+ DP pulses    -------------------------------------------------------------------------------------------  RELEVANT LABS:    -------------------------------------------------------------------------------------------  RELEVANT IMAGING:    EC/4/25: sinus rhythm with incomplete RBBB and LAD  25: sinus rhythm with incomplete RBBB and LAD  25: Afib with incomplete RBBB and LAD, HR 100s    Echo:     25:   1. Left ventricular systolic function is severely decreased with an ejection fraction of 35 % by Michaels's method of disks. Global left ventricular hypokinesis.   2. Enlarged right ventricular cavity size and reduced right ventricular systolic function.   3. Trace pericardial effusion with no echocardiographic evidence of tamponade physiology.    25:  CONCLUSIONS:      1. Left ventricular systolic function is moderately decreased with an ejection fraction visually estimated at 40 %.   2. There is hypokinesis of the inferior and inferolateral walls.   3. Enlarged right ventricular cavity size and reduced right ventricular systolic function.    1/3/25:  CONCLUSIONS:      1. Left ventricular systolic function is moderately to severely decreased with an ejection fraction of 35 % by 3D. Global left ventricular hypokinesis.   2. Enlarged right ventricular cavity size and moderately to severely reduced right ventricular systolic function.   3. Severe tricuspid regurgitation.   4. Compared to the transthoracic echocardiogram performed on 1/3/2025, there have been no significant interval changes.    Stress Testin/20/24:   1. Myocardial Perfusion: Abnormal.   2. Baseline electrocardiogram: normal sinus rhythm at a rate of 66 bpm with q anterior leads,T wave inversion lateral leads.   3. Electrocardiogram ischemic changes: 1 mm horizontal st-t depression in inferior leads which started 1 minute into stress test and persisted 4 minutes into recovery.   4. Qualitative Perfusion:      - medium-sized, severe defect(s) in the inferior wall that is reversible suggestive of ischemia. - medium-sized, severe defect(s) in the lateral wall that is fixed suggestive of an infarction. - medium-sized, severe defect(s) in the anterior and apical wall that is fixed suggestive of an infarction.   5. The left ventricle is severely reduced in function and moderately enlarged in size. The post stress left ventricular EF is 24 %. The stress end diastolic volume is 213 ml and systolic volume is 162 ml.   6. The inferior, anterior and lateral walls are hypokinetic.   7. The above finding were discussed with  at 12:07pm.    Cath:    24:  The coronary circulation is right dominant. Cardiac catheterization  was performed electively.    LM Left main artery: Angiography shows minor irregularities.      LAD Proximal left anterior descending: There is a 70 % stenosis in the  distal third portion of the segment at branch to D1 and within  the stented segment. AUSTYN Flow 3.    First diagonal: There is a 95 % stenosis in the ostium portion of the  segment within the stented segment. AUSTYN Flow 3.    CX Mid circumflex: There is an 80 % stenosis in the middle third portion  of the segment within the stented segment. AUSTYN Flow 3.    RCA Mid right coronary artery: There is a 20 % stenosis in the proximal  third portion of the segment.  Right posterior descending artery: Angiography shows mild  atherosclerosis.  Right Posterolateral Segment: The segment is small. Angiography shows  moderate atherosclerosis.    Left Heart Cath Left ventricular function was not assessed.        CABG (24);  Coronary artery bypass grafting x3 with a free mammary to the LAD, saphenous vein graft to the diagonal branch and saphenous vein graft to the circumflex marginal branch.    (LIMA - LAD | SVG - D1 | SVG - OM)     -------------------------------------------------------------------------------------------                 53 y/o male with PMHx of hypertension, ESRD on HD, CAD (s/p PCI in 2024), chronic systolic heart failure, chronic ascites recent admission for cholecystitis s/p cholecystectomy who was admitted for acute decompensated heart failure, and found to have abnormal nuclear stress. LHC showed multi-vessel coronary disease, for which he underwent a CABG (LIMA - LAD | SVG - D1 | SVG - LPL) on 24. Post-op course complicated by shock requiring IABP + dobutamine, ESBL E. coli bacteremia, new-onset atrial fibrillation (25). Currently on amiodarone and IV heparin. EP Is consulted for rhythm control.     On my evaluation today, patient had fatigue, cough and shortness of breath with exertion. Denies ongoing chest pain, palpitations, lightheadedness or syncope. Extubated yesterday. Now on BiPAP.    Telemetry from 25 till today shows atrial fibrillation with HR ~. Currently on dobutamine 5.    PAST MEDICAL & SURGICAL HISTORY:  Type 2 diabetes mellitus  Hypertension  End stage renal disease  started HD 2019 T, Th, Sat via right chest permacath  Bone spur  right shoulder- hx of - sx done  Anemia  Injury of right wrist  hx of at age 14  History of hyperkalemia  before HD- K-6.2 - to repeat in am of sx, pt had HD today  S/P arthroscopy of right shoulder  2010  History of vascular access device  right chest permacath - 2019  H/O right wrist surgery  tendons repair - at age 14  AV fistula  H/O ventral hernia repair  S/P cholecystectomy    FAMILY HISTORY: relevant information in HPI    SOCIAL HISTORY:  relevant information in HPI    MEDICATIONS:  aMIOdarone    Tablet   Oral   aMIOdarone    Tablet 400 milliGRAM(s) Oral every 8 hours  aMIOdarone Infusion 0.5 mG/Min IV Continuous <Continuous>  aspirin  chewable 81 milliGRAM(s) Oral daily  DOBUTamine Infusion 5 MICROgram(s)/kG/Min IV Continuous <Continuous>  heparin  Infusion 300 Unit(s)/Hr IV Continuous <Continuous>    meropenem  IVPB 1000 milliGRAM(s) IV Intermittent every 8 hours    albuterol/ipratropium for Nebulization 3 milliLiter(s) Nebulizer every 6 hours  buDESOnide    Inhalation Suspension 0.25 milliGRAM(s) Inhalation every 12 hours    dexMEDEtomidine Infusion 0.5 MICROgram(s)/kG/Hr IV Continuous <Continuous>  fentaNYL    Injectable 12.5 MICROGram(s) IV Push every 2 hours PRN  fentaNYL    Injectable 25 MICROGram(s) IV Push every 3 hours PRN  gabapentin 100 milliGRAM(s) Oral every 12 hours    bisacodyl Suppository 10 milliGRAM(s) Rectal once  pantoprazole  Injectable 40 milliGRAM(s) IV Push daily  polyethylene glycol 3350 17 Gram(s) Oral two times a day  senna 2 Tablet(s) Oral at bedtime    dextrose 50% Injectable 50 milliLiter(s) IV Push every 15 minutes  dextrose 50% Injectable 25 milliLiter(s) IV Push every 15 minutes  insulin lispro (ADMELOG) corrective regimen sliding scale   SubCutaneous every 6 hours  vasopressin Infusion 0.03 Unit(s)/Min IV Continuous <Continuous>    chlorhexidine 2% Cloths 1 Application(s) Topical daily  chlorhexidine 4% Liquid 1 Application(s) Topical <User Schedule>  epoetin ignacia (EPOGEN) Injectable 83752 Unit(s) IV Push <User Schedule>  lidocaine   4% Patch 2 Patch Transdermal daily  Nephro-elicia 1 Tablet(s) Oral daily  sodium chloride 0.9% lock flush 10 milliLiter(s) IV Push every 1 hour PRN  sodium chloride 0.9%. 1000 milliLiter(s) IV Continuous <Continuous>      -------------------------------------------------------------------------------------------  PHYSICAL EXAM:  T(C): 37.2 (25 @ 12:00), Max: 37.4 (25 @ 20:00)  HR: 106 (25 @ 12:00) (83 - 118)  BP: --  RR: 16 (25 @ 12:00) (6 - 31)  SpO2: 100% (25 @ 12:00) (90% - 100%)    GENERAL: no acute distress  NECK: JVP is difficult to assess  CHEST/LUNG: coarse breath sounds  HEART: irregularly irregular rhythm, no obvious murmurs  ABDOMEN: soft, non-tender, non-distended  EXTREMITIES:  warm, no LE edema    -------------------------------------------------------------------------------------------  RELEVANT LABS:    WBC 27  Hgb 8.3  Plt 82    INR 1.4  PTT 30    K 3.7  Mag 2.4  Cr 1.99    lactate 1.5  -------------------------------------------------------------------------------------------  RELEVANT IMAGING:    EC/4/25: sinus rhythm with incomplete RBBB and LAD  25: sinus rhythm with incomplete RBBB and LAD  25: Afib with incomplete RBBB and LAD, HR 100s    Echo:     25:   1. Left ventricular systolic function is severely decreased with an ejection fraction of 35 % by Michaels's method of disks. Global left ventricular hypokinesis.   2. Enlarged right ventricular cavity size and reduced right ventricular systolic function.   3. Trace pericardial effusion with no echocardiographic evidence of tamponade physiology.    25:  CONCLUSIONS:      1. Left ventricular systolic function is moderately decreased with an ejection fraction visually estimated at 40 %.   2. There is hypokinesis of the inferior and inferolateral walls.   3. Enlarged right ventricular cavity size and reduced right ventricular systolic function.    1/3/25:  CONCLUSIONS:      1. Left ventricular systolic function is moderately to severely decreased with an ejection fraction of 35 % by 3D. Global left ventricular hypokinesis.   2. Enlarged right ventricular cavity size and moderately to severely reduced right ventricular systolic function.   3. Severe tricuspid regurgitation.   4. Compared to the transthoracic echocardiogram performed on 1/3/2025, there have been no significant interval changes.    Stress Testin/20/24:   1. Myocardial Perfusion: Abnormal.   2. Baseline electrocardiogram: normal sinus rhythm at a rate of 66 bpm with q anterior leads,T wave inversion lateral leads.   3. Electrocardiogram ischemic changes: 1 mm horizontal st-t depression in inferior leads which started 1 minute into stress test and persisted 4 minutes into recovery.   4. Qualitative Perfusion:      - medium-sized, severe defect(s) in the inferior wall that is reversible suggestive of ischemia. - medium-sized, severe defect(s) in the lateral wall that is fixed suggestive of an infarction. - medium-sized, severe defect(s) in the anterior and apical wall that is fixed suggestive of an infarction.   5. The left ventricle is severely reduced in function and moderately enlarged in size. The post stress left ventricular EF is 24 %. The stress end diastolic volume is 213 ml and systolic volume is 162 ml.   6. The inferior, anterior and lateral walls are hypokinetic.   7. The above finding were discussed with  at 12:07pm.    Cath:    24:  The coronary circulation is right dominant. Cardiac catheterization  was performed electively.    LM Left main artery: Angiography shows minor irregularities.      LAD Proximal left anterior descending: There is a 70 % stenosis in the  distal third portion of the segment at branch to D1 and within  the stented segment. AUSTYN Flow 3.    First diagonal: There is a 95 % stenosis in the ostium portion of the  segment within the stented segment. AUSTYN Flow 3.    CX Mid circumflex: There is an 80 % stenosis in the middle third portion  of the segment within the stented segment. AUSTYN Flow 3.    RCA Mid right coronary artery: There is a 20 % stenosis in the proximal  third portion of the segment.  Right posterior descending artery: Angiography shows mild  atherosclerosis.  Right Posterolateral Segment: The segment is small. Angiography shows  moderate atherosclerosis.    Left Heart Cath Left ventricular function was not assessed.        CABG (24);  Coronary artery bypass grafting x3 with a free mammary to the LAD, saphenous vein graft to the diagonal branch and saphenous vein graft to the circumflex marginal branch.    (LIMA - LAD | SVG - D1 | SVG - LPL)     -------------------------------------------------------------------------------------------

## 2025-01-09 NOTE — PROGRESS NOTE ADULT - SUBJECTIVE AND OBJECTIVE BOX
Patient seen and examined at the bedside.    Remains critically ill on continuous ICU monitoring.      Brief Summary:  55 yo M with multiple medical problems including CAD s/p PCI in April 2024 s/p CABG x 3 on 12/30/24 1/2: Intubated for hypoxia & left lung white out s/p awake bronch with removal of copious mucopurulent secretions  1/4: s/p IABP insertion, sepsis/septic shock due to E coli     24 Hour events:  PS during the day, rested overnight  CRRT hx of ESRD on HD   Off vasopressors   Remains on   Continues on Amio for new onset AF  Restarted low dose Heparin gtt    Objective:  Vital Signs Last 24 Hrs  T(C): 37.2 (09 Jan 2025 04:00), Max: 37.4 (08 Jan 2025 20:00)  T(F): 99 (09 Jan 2025 04:00), Max: 99.3 (08 Jan 2025 20:00)  HR: 95 (09 Jan 2025 07:00) (76 - 118)  BP: --  BP(mean): --  RR: 10 (09 Jan 2025 07:00) (6 - 33)  SpO2: 100% (09 Jan 2025 07:00) (90% - 100%)    Parameters below as of 09 Jan 2025 05:40  Patient On (Oxygen Delivery Method): nasal cannula, high flow    Mode: CPAP with PS  FiO2: 40  PEEP: 8  PS: 5  MAP: 8  PIP: 13    Physical Exam:  General: Intubated, awake, alert and responsive   Neurology: moving all extremities to command, non-focal   Respiratory: Clear bilaterally   CV: Afib  Abdominal: Soft, Nontender  Extremities: Warm, well-perfused  -------------------------------------------------------------------------------------------------------------------------------    Labs:                        8.3    27.85 )-----------( 82       ( 09 Jan 2025 00:31 )             26.7     01-09    135  |  103  |  32[H]  ----------------------------<  202[H]  3.7   |  19[L]  |  1.99[H]    Ca    8.0[L]      09 Jan 2025 00:31  Phos  2.6     01-09  Mg     2.4     01-09    TPro  5.7[L]  /  Alb  2.8[L]  /  TBili  1.5[H]  /  DBili  x   /  AST  68[H]  /  ALT  171[H]  /  AlkPhos  114  01-09    LIVER FUNCTIONS - ( 09 Jan 2025 00:31 )  Alb: 2.8 g/dL / Pro: 5.7 g/dL / ALK PHOS: 114 U/L / ALT: 171 U/L / AST: 68 U/L / GGT: x           PT/INR - ( 08 Jan 2025 20:56 )   PT: 16.3 sec;   INR: 1.43 ratio      PTT - ( 09 Jan 2025 03:36 )  PTT:31.2 sec  ABG - ( 09 Jan 2025 00:54 )  pH, Arterial: 7.37  pH, Blood: x     /  pCO2: 38    /  pO2: 126   / HCO3: 22    / Base Excess: -3.0  /  SaO2: 99.9    ------------------------------------------------------------------------------------------------------------------------------  Assessment:  55 yo M s/p CABG x 3 on 12/30/24    Postop acute respiratory failure  Cardiogenic and Septic shock  Acute blood loss anemia  Transaminitis  Ascites   Thrombocytopenia   ESRD on iHD requiring CRRT   E coli bacteremia 2/2 pneumonia  Leukocytosis     Plan:    ***Neuro***  RASS goal 0-1, Precedex PRN   Postoperative acute pain control with Gabapentin and prn Fentanyl.     ***Cardiovascular***  s/p CABG x 3  d/c R. fem IABP 1/6  Off pressors, MAP > 65 mm Hg.   Remains on Dobutamine - wean as tolerated   Amiodarone infusion for a fib - transition to PO once extubated   Alex weaned off 1/7  Holding ASA / Plavix in setting of thrombocytopenia   Colchicine for pericarditis   Vascular surgery consulted for fistula evaluation  1/5 TTE     1. Left ventricular systolic function is moderately decreased with an ejection fraction visually estimated at 40 %.   2. There is hypokinesis of the inferior and inferolateral walls.   3. Enlarged right ventricular cavity size and reduced right ventricular systolic function.    ***Pulmonary***  Postoperative acute respiratory failure  Daily SAT/SBT  and PS as tolerated.   Continue vent support   May need early trach placement iso heavy secretion burden/pneumonia     ***GI***  Tube feeds   Protonix for stress ulcer prophylaxis.   Bowel regimen.  Ascites: Paracentesis 1/3 - negative for SBP, will consider additional paracentesis with persistent fevers or reaccumulation with concern for intraabdominal HTN     ***Renal***  hx of ESRD iHD requiring CRRT - consider transitioning if remains off vasoactive medications.  LUE AV fistula   Plan for event to slightly negative yesterday    ***ID***  Sepsis/septic shock due to ESBL E. coli - Continue Meropenem, ongoing fevers   1/7 Bcx pending   1/5 Blood cultures negative to date  1/3 Bronch ESBL E Coli   1/2 Bcx ESBL E coli   Trend leukocytosis.  Surgical chest wall wound well appearing - no active infection     ***Endocrine***  Hyperglycemia- Insulin sliding scale    ***Hematology***  Acute blood loss anemia and thrombocytopenia.  No current transfusion indication.  PF4 negative 1/4  Low dose Heparin gtt for persistent AF     *** Lines ***  [facility] TLC - 1/7  R fem HD cath - 1/3   R rad afia - 12/30        Care plan discussed with the ICU care team.   Patient remains critical, at risk for life threatening decompensation.    I have spent 30 minutes providing critical care management to this patient.    By signing my name below, I, Baldemar Hernandez, attest that this documentation has been prepared under the direction and in the presence of Blaze Finch MD.  Electronically signed: Evelio Sevilla 01-09-25 @ 07:35    I, Blaze Finch MD,  personally performed the services described in this documentation. All medical record entries made by the scribe were at my direction and in my presence. I have reviewed the chart and agree that the record reflects my personal performance and is accurate and complete  Electronically signed: Blaze Finch MD. Patient seen and examined at the bedside.    Remains critically ill on continuous ICU monitoring.      Brief Summary:  53 yo M with multiple medical problems including CAD s/p PCI in April 2024 s/p CABG x 3 on 12/30/24 1/2: Intubated for hypoxia & left lung white out s/p awake bronch with removal of copious mucopurulent secretions  1/4: s/p IABP insertion, sepsis/septic shock due to E coli     24 Hour events:  Extubated to HFNC   Dobutamine restarted in the setting of increasing pressors and lactic acidosis   BiPAP overnight for recruitment     Objective:  Vital Signs Last 24 Hrs  T(C): 37.2 (09 Jan 2025 04:00), Max: 37.4 (08 Jan 2025 20:00)  T(F): 99 (09 Jan 2025 04:00), Max: 99.3 (08 Jan 2025 20:00)  HR: 95 (09 Jan 2025 07:00) (76 - 118)  BP: --  BP(mean): --  RR: 10 (09 Jan 2025 07:00) (6 - 33)  SpO2: 100% (09 Jan 2025 07:00) (90% - 100%)    Parameters below as of 09 Jan 2025 05:40  Patient On (Oxygen Delivery Method): nasal cannula, high flow    Mode: CPAP with PS  FiO2: 40  PEEP: 8  PS: 5  MAP: 8  PIP: 13    Physical Exam:  General: awake, alert and responsive   Neurology: moving all extremities to command, non-focal   Respiratory: diminished left base   CV: Afib  Abdominal: Soft, Nontender  Extremities: Warm, well-perfused  -------------------------------------------------------------------------------------------------------------------------------    Labs:                        8.3    27.85 )-----------( 82       ( 09 Jan 2025 00:31 )             26.7     01-09    135  |  103  |  32[H]  ----------------------------<  202[H]  3.7   |  19[L]  |  1.99[H]    Ca    8.0[L]      09 Jan 2025 00:31  Phos  2.6     01-09  Mg     2.4     01-09    TPro  5.7[L]  /  Alb  2.8[L]  /  TBili  1.5[H]  /  DBili  x   /  AST  68[H]  /  ALT  171[H]  /  AlkPhos  114  01-09    LIVER FUNCTIONS - ( 09 Jan 2025 00:31 )  Alb: 2.8 g/dL / Pro: 5.7 g/dL / ALK PHOS: 114 U/L / ALT: 171 U/L / AST: 68 U/L / GGT: x           PT/INR - ( 08 Jan 2025 20:56 )   PT: 16.3 sec;   INR: 1.43 ratio      PTT - ( 09 Jan 2025 03:36 )  PTT:31.2 sec  ABG - ( 09 Jan 2025 00:54 )  pH, Arterial: 7.37  pH, Blood: x     /  pCO2: 38    /  pO2: 126   / HCO3: 22    / Base Excess: -3.0  /  SaO2: 99.9    ------------------------------------------------------------------------------------------------------------------------------  Assessment:  53 yo M s/p CABG x 3 on 12/30/24    Postop acute respiratory failure  Cardiogenic and Septic shock  Acute blood loss anemia  Transaminitis  Ascites   Thrombocytopenia   ESRD on iHD requiring CRRT   E coli bacteremia 2/2 pneumonia  Leukocytosis     Plan:    ***Neuro***  Postoperative acute pain control with Gabapentin and prn Fentanyl.     ***Cardiovascular***  s/p CABG x 3  d/c R. fem IABP 1/6  Off pressors, MAP > 65 mm Hg.   Remains on Dobutamine - wean as tolerated   Amiodarone infusion for a fib - transition to PO today   Consider cardioversion of AF   Alex weaned off 1/7  Holding ASA / Plavix in setting of thrombocytopenia   Colchicine for pericarditis   Vascular surgery consulted for fistula evaluation  1/5 TTE     1. Left ventricular systolic function is moderately decreased with an ejection fraction visually estimated at 40 %.   2. There is hypokinesis of the inferior and inferolateral walls.   3. Enlarged right ventricular cavity size and reduced right ventricular systolic function.  Repeat TTE today 1/9    ***Pulmonary***  Postoperative acute respiratory failure - extubated 1/8 to HFNC  Intermittent BiPAP for recruitment     ***GI***  Tube feeds   Protonix for stress ulcer prophylaxis.   Bowel regimen.  Ascites: Paracentesis 1/3 - negative for SBP, will consider additional paracentesis with persistent fevers or reaccumulation with concern for intraabdominal HTN     ***Renal***  hx of ESRD iHD - first session of HD today   LUE AV fistula     ***ID***  Sepsis/septic shock due to ESBL E. coli - Continue Meropenem, ongoing fevers   Repeat BCx today   1/7 Bcx negative to date  1/5 Bcx negative to date  1/3 Bronch ESBL E Coli   1/2 Bcx ESBL E coli   Trend leukocytosis.  Surgical chest wall wound well appearing - no active infection     ***Endocrine***  Hyperglycemia- Insulin sliding scale    ***Hematology***  Acute blood loss anemia and thrombocytopenia.  No current transfusion indication.  PF4 negative 1/4  Heparin gtt for persistent AF     *** Lines ***  [facility] TLC - 1/7  R fem HD cath - 1/3 - remove today   R rad afai - 12/30      Care plan discussed with the ICU care team.   Patient remains critical, at risk for life threatening decompensation.    I have spent 55 minutes providing critical care management to this patient.    By signing my name below, I, Baldemar Hernandez, attest that this documentation has been prepared under the direction and in the presence of Blaze Finch MD.  Electronically signed: Evelio Sevilla 01-09-25 @ 07:35    I, Blaze Finch MD,  personally performed the services described in this documentation. All medical record entries made by the scribe were at my direction and in my presence. I have reviewed the chart and agree that the record reflects my personal performance and is accurate and complete  Electronically signed: Blaze Finch MD.

## 2025-01-09 NOTE — PROGRESS NOTE ADULT - ASSESSMENT
54M with PMH of HTN, HLD, ESRD on HD MWF via LUE AVF, ascites (requiring repeat paracenteses q4-6wks), NICM with moderate LV dysfunction w/ EF 35-40%(2023) s/p CABG on 12/30/24. General surgery consulted today for tracheostomy creation. Patient initially refused tracheostomy placement, however was successfully extubated overnight.     Recommendations  - No need for tracheostomy at this time  - Surgery remains available for tracheostomy creation should patient be reintubated again and is agreeable     Plan discussed with attending Dr. Thomson     Trauma/ACS  [phone]

## 2025-01-09 NOTE — PHYSICAL THERAPY INITIAL EVALUATION ADULT - PLANNED THERAPY INTERVENTIONS, PT EVAL
GOAL: Pt will negotiate 1 flight of stairs using handrail independently to ensure safe home entry, within 2 weeks./balance training/bed mobility training/gait training/strengthening/transfer training
GOAL: Pt will negotiate 1 flight of stairs using handrail independently to ensure safe home entry, within 2 weeks./balance training/bed mobility training/gait training/strengthening/transfer training

## 2025-01-09 NOTE — PROGRESS NOTE ADULT - SUBJECTIVE AND OBJECTIVE BOX
Patient is a 54y old  Male who presents with a chief complaint of CAD s/p CABG (09 Jan 2025 07:21)    Being followed by ID for        Interval history:  No other acute events      ROS:  No cough,SOB,CP  No N/V/D  No abd pain  No urinary complaints  No HA  No joint or limb pain  No other complaints    PAST MEDICAL & SURGICAL HISTORY:  Type 2 diabetes mellitus      Hypertension      End stage renal disease  started HD 2/2019 T, Th, Sat via right chest permacath      Bone spur  right shoulder- hx of - sx done      Anemia      Injury of right wrist  hx of at age 14      History of hyperkalemia  before HD- K-6.2 - to repeat in am of sx, pt had HD today      S/P arthroscopy of right shoulder  2010      History of vascular access device  right chest permacath - 2/2019      H/O right wrist surgery  tendons repair - at age 14      AV fistula      H/O ventral hernia repair      S/P cholecystectomy        Allergies    No Known Allergies    Intolerances      Antimicrobials:    meropenem  IVPB 1000 milliGRAM(s) IV Intermittent every 8 hours    MEDICATIONS  (STANDING):  albuterol/ipratropium for Nebulization 3 milliLiter(s) Nebulizer every 6 hours  aMIOdarone    Tablet   Oral   aMIOdarone    Tablet 400 milliGRAM(s) Oral every 8 hours  aMIOdarone Infusion 0.5 mG/Min (16.7 mL/Hr) IV Continuous <Continuous>  aspirin  chewable 81 milliGRAM(s) Oral daily  bisacodyl Suppository 10 milliGRAM(s) Rectal once  buDESOnide    Inhalation Suspension 0.25 milliGRAM(s) Inhalation every 12 hours  chlorhexidine 2% Cloths 1 Application(s) Topical daily  chlorhexidine 4% Liquid 1 Application(s) Topical <User Schedule>  dexMEDEtomidine Infusion 0.5 MICROgram(s)/kG/Hr (10.6 mL/Hr) IV Continuous <Continuous>  dextrose 50% Injectable 50 milliLiter(s) IV Push every 15 minutes  dextrose 50% Injectable 25 milliLiter(s) IV Push every 15 minutes  DOBUTamine Infusion 5 MICROgram(s)/kG/Min (6.38 mL/Hr) IV Continuous <Continuous>  epoetin ignacia (EPOGEN) Injectable 33820 Unit(s) IV Push <User Schedule>  gabapentin 100 milliGRAM(s) Oral every 12 hours  heparin  Infusion 300 Unit(s)/Hr (7 mL/Hr) IV Continuous <Continuous>  insulin lispro (ADMELOG) corrective regimen sliding scale   SubCutaneous every 6 hours  lidocaine   4% Patch 2 Patch Transdermal daily  meropenem  IVPB 1000 milliGRAM(s) IV Intermittent every 8 hours  Nephro-elicia 1 Tablet(s) Oral daily  pantoprazole  Injectable 40 milliGRAM(s) IV Push daily  polyethylene glycol 3350 17 Gram(s) Oral two times a day  senna 2 Tablet(s) Oral at bedtime  sodium chloride 0.9%. 1000 milliLiter(s) (10 mL/Hr) IV Continuous <Continuous>  vasopressin Infusion 0.03 Unit(s)/Min (4.5 mL/Hr) IV Continuous <Continuous>      Vital Signs Last 24 Hrs  T(C): 37.2 (01-09-25 @ 12:00), Max: 37.4 (01-08-25 @ 20:00)  T(F): 99 (01-09-25 @ 12:00), Max: 99.3 (01-08-25 @ 20:00)  HR: 99 (01-09-25 @ 15:00) (83 - 118)  BP: --  BP(mean): --  RR: 23 (01-09-25 @ 15:00) (6 - 31)  SpO2: 97% (01-09-25 @ 15:00) (76% - 100%)    Physical Exam:    Constitutional well preserved,comfortable,pleasant    HEENT PERRLA EOMI,No pallor or icterus    No oral exudate or erythema    Neck supple no JVD or LN    Chest Good AE,CTA    CVS RRR S1 S2 WNl No murmur or rub or gallop    Abd soft BS normal No tenderness no masses    Ext No cyanosis clubbing or edema    IV site no erythema tenderness or discharge    Joints no swelling or LOM    CNS AAO X 3 no focal    Lab Data:                          8.3    27.85 )-----------( 82       ( 09 Jan 2025 00:31 )             26.7       01-09    135  |  103  |  32[H]  ----------------------------<  202[H]  3.7   |  19[L]  |  1.99[H]    Ca    8.0[L]      09 Jan 2025 00:31  Phos  2.6     01-09  Mg     2.4     01-09    TPro  5.7[L]  /  Alb  2.8[L]  /  TBili  1.5[H]  /  DBili  x   /  AST  68[H]  /  ALT  171[H]  /  AlkPhos  114  01-09      Urinalysis Basic - ( 09 Jan 2025 00:31 )    Color: x / Appearance: x / SG: x / pH: x  Gluc: 202 mg/dL / Ketone: x  / Bili: x / Urobili: x   Blood: x / Protein: x / Nitrite: x   Leuk Esterase: x / RBC: x / WBC x   Sq Epi: x / Non Sq Epi: x / Bacteria: x        .Blood BLOOD  01-07-25   No growth at 24 hours  --  --      .Blood BLOOD  01-05-25   No growth at 72 Hours  --  --      .Blood BLOOD  01-05-25   No growth at 72 Hours  --  --      Peritoneal  01-03-25   No growth at 5 days  --    polymorphonuclear leukocytes seen  No organisms seen  by cytocentrifuge      Bronchial  01-03-25   Culture is being performed.  --  --      Bronchial  01-03-25   Numerous Escherichia coli ESBL  Commensal manjit consistent with body site  --  Escherichia coli ESBL      .Blood BLOOD  01-02-25   Growth in aerobic and anaerobic bottles: Escherichia coli ESBL  --  Escherichia coli ESBL      .Blood BLOOD  01-02-25   Growth in aerobic and anaerobic bottles: Escherichia coli ESBL  See previous culture [id number]  Direct identification is available within approximately 3-5  hours either by Blood Panel Multiplexed PCR or Direct  MALDI-TOF. Details: [email or web address]  --  Blood Culture PCR      .Sputum Sputum  01-02-25   Numerous Escherichia coli ESBL  Commensal manjit consistent with body site  --  Escherichia coli ESBL                    WBC Count: 27.85 (01-09-25 @ 00:31)  WBC Count: 25.63 (01-08-25 @ 00:28)  WBC Count: 21.04 (01-07-25 @ 00:26)  WBC Count: 23.28 (01-06-25 @ 18:25)  WBC Count: 21.81 (01-06-25 @ 13:30)  WBC Count: 23.31 (01-06-25 @ 00:35)  WBC Count: 24.30 (01-05-25 @ 00:48)  WBC Count: 19.46 (01-04-25 @ 14:33)  WBC Count: 13.29 (01-04-25 @ 00:56)  WBC Count: 9.39 (01-03-25 @ 16:17)  WBC Count: 4.71 (01-03-25 @ 09:25)  WBC Count: 1.11 (01-03-25 @ 01:31)  WBC Count: 1.35 (01-03-25 @ 00:43)       Bilirubin Total: 1.5 mg/dL (01-09-25 @ 00:31)  Aspartate Aminotransferase (AST/SGOT): 68 U/L (01-09-25 @ 00:31)  Alanine Aminotransferase (ALT/SGPT): 171 U/L (01-09-25 @ 00:31)  Alkaline Phosphatase: 114 U/L (01-09-25 @ 00:31)  Bilirubin Total: 1.9 mg/dL (01-08-25 @ 00:27)  Aspartate Aminotransferase (AST/SGOT): 97 U/L (01-08-25 @ 00:27)  Alanine Aminotransferase (ALT/SGPT): 240 U/L (01-08-25 @ 00:27)  Alkaline Phosphatase: 106 U/L (01-08-25 @ 00:27)  Bilirubin Total: 2.3 mg/dL (01-07-25 @ 00:26)  Aspartate Aminotransferase (AST/SGOT): 180 U/L (01-07-25 @ 00:26)  Alanine Aminotransferase (ALT/SGPT): 377 U/L (01-07-25 @ 00:26)  Alkaline Phosphatase: 102 U/L (01-07-25 @ 00:26)  Bilirubin Total: 2.3 mg/dL (01-06-25 @ 00:35)  Aspartate Aminotransferase (AST/SGOT): 301 U/L (01-06-25 @ 00:35)  Alanine Aminotransferase (ALT/SGPT): 565 U/L (01-06-25 @ 00:35)  Alkaline Phosphatase: 122 U/L (01-06-25 @ 00:35)  Bilirubin Total: 2.6 mg/dL (01-05-25 @ 00:47)  Aspartate Aminotransferase (AST/SGOT): 560 U/L (01-05-25 @ 00:47)  Alkaline Phosphatase: 108 U/L (01-05-25 @ 00:47)  Alanine Aminotransferase (ALT/SGPT): 787 U/L (01-05-25 @ 00:47)         Patient is a 54y old  Male who presents with a chief complaint of CAD s/p CABG (09 Jan 2025 07:21)    Being followed by ID for        Interval history:  pt extubated  pt denies abd pain   remains afebrile but WBC continues to rise  no diarrhea   No other acute events      PAST MEDICAL & SURGICAL HISTORY:  Type 2 diabetes mellitus      Hypertension      End stage renal disease  started HD 2/2019 T, Th, Sat via right chest permacath      Bone spur  right shoulder- hx of - sx done      Anemia      Injury of right wrist  hx of at age 14      History of hyperkalemia  before HD- K-6.2 - to repeat in am of sx, pt had HD today      S/P arthroscopy of right shoulder  2010      History of vascular access device  right chest permacath - 2/2019      H/O right wrist surgery  tendons repair - at age 14      AV fistula      H/O ventral hernia repair      S/P cholecystectomy        Allergies    No Known Allergies    Intolerances      Antimicrobials:    meropenem  IVPB 1000 milliGRAM(s) IV Intermittent every 8 hours    MEDICATIONS  (STANDING):  albuterol/ipratropium for Nebulization 3 milliLiter(s) Nebulizer every 6 hours  aMIOdarone    Tablet   Oral   aMIOdarone    Tablet 400 milliGRAM(s) Oral every 8 hours  aMIOdarone Infusion 0.5 mG/Min (16.7 mL/Hr) IV Continuous <Continuous>  aspirin  chewable 81 milliGRAM(s) Oral daily  bisacodyl Suppository 10 milliGRAM(s) Rectal once  buDESOnide    Inhalation Suspension 0.25 milliGRAM(s) Inhalation every 12 hours  chlorhexidine 2% Cloths 1 Application(s) Topical daily  chlorhexidine 4% Liquid 1 Application(s) Topical <User Schedule>  dexMEDEtomidine Infusion 0.5 MICROgram(s)/kG/Hr (10.6 mL/Hr) IV Continuous <Continuous>  dextrose 50% Injectable 50 milliLiter(s) IV Push every 15 minutes  dextrose 50% Injectable 25 milliLiter(s) IV Push every 15 minutes  DOBUTamine Infusion 5 MICROgram(s)/kG/Min (6.38 mL/Hr) IV Continuous <Continuous>  epoetin ignacia (EPOGEN) Injectable 50111 Unit(s) IV Push <User Schedule>  gabapentin 100 milliGRAM(s) Oral every 12 hours  heparin  Infusion 300 Unit(s)/Hr (7 mL/Hr) IV Continuous <Continuous>  insulin lispro (ADMELOG) corrective regimen sliding scale   SubCutaneous every 6 hours  lidocaine   4% Patch 2 Patch Transdermal daily  meropenem  IVPB 1000 milliGRAM(s) IV Intermittent every 8 hours  Nephro-elicia 1 Tablet(s) Oral daily  pantoprazole  Injectable 40 milliGRAM(s) IV Push daily  polyethylene glycol 3350 17 Gram(s) Oral two times a day  senna 2 Tablet(s) Oral at bedtime  sodium chloride 0.9%. 1000 milliLiter(s) (10 mL/Hr) IV Continuous <Continuous>  vasopressin Infusion 0.03 Unit(s)/Min (4.5 mL/Hr) IV Continuous <Continuous>      Vital Signs Last 24 Hrs  T(C): 37.2 (01-09-25 @ 12:00), Max: 37.4 (01-08-25 @ 20:00)  T(F): 99 (01-09-25 @ 12:00), Max: 99.3 (01-08-25 @ 20:00)  HR: 99 (01-09-25 @ 15:00) (83 - 118)  BP: --  BP(mean): --  RR: 23 (01-09-25 @ 15:00) (6 - 31)  SpO2: 97% (01-09-25 @ 15:00) (76% - 100%)    Physical Exam:    Constitutional well preserved,comfortable,pleasant    HEENT PERRLA EOMI,No pallor or icterus    No oral exudate or erythema    Neck supple no JVD or LN    Chest Good AE,CTA    CVS  S1 S2     Abd softly distended No tenderness     Ext No cyanosis clubbing or edema    IV site no erythema tenderness or discharge    Joints no swelling or LOM    CNS AAO X 3 no focal    Lab Data:                          8.3    27.85 )-----------( 82       ( 09 Jan 2025 00:31 )             26.7       01-09    135  |  103  |  32[H]  ----------------------------<  202[H]  3.7   |  19[L]  |  1.99[H]    Ca    8.0[L]      09 Jan 2025 00:31  Phos  2.6     01-09  Mg     2.4     01-09    TPro  5.7[L]  /  Alb  2.8[L]  /  TBili  1.5[H]  /  DBili  x   /  AST  68[H]  /  ALT  171[H]  /  AlkPhos  114  01-09          .Blood BLOOD  01-07-25   No growth at 24 hours  --  --      .Blood BLOOD  01-05-25   No growth at 72 Hours  --  --      .Blood BLOOD  01-05-25   No growth at 72 Hours  --  --      Peritoneal  01-03-25   No growth at 5 days  --    polymorphonuclear leukocytes seen  No organisms seen  by cytocentrifuge      Bronchial  01-03-25   Culture is being performed.  --  --      Bronchial  01-03-25   Numerous Escherichia coli ESBL  Commensal manjit consistent with body site  --  Escherichia coli ESBL      .Blood BLOOD  01-02-25   Growth in aerobic and anaerobic bottles: Escherichia coli ESBL  --  Escherichia coli ESBL      .Blood BLOOD  01-02-25   Growth in aerobic and anaerobic bottles: Escherichia coli ESBL  See previous culture [id number]  Direct identification is available within approximately 3-5  hours either by Blood Panel Multiplexed PCR or Direct  MALDI-TOF. Details: [email or web address]  --  Blood Culture PCR      .Sputum Sputum  01-02-25   Numerous Escherichia coli ESBL  Commensal manjit consistent with body site  --  Escherichia coli ESBL        WBC Count: 27.85 (01-09-25 @ 00:31)  WBC Count: 25.63 (01-08-25 @ 00:28)  WBC Count: 21.04 (01-07-25 @ 00:26)  WBC Count: 23.28 (01-06-25 @ 18:25)  WBC Count: 21.81 (01-06-25 @ 13:30)  WBC Count: 23.31 (01-06-25 @ 00:35)  WBC Count: 24.30 (01-05-25 @ 00:48)  WBC Count: 19.46 (01-04-25 @ 14:33)  WBC Count: 13.29 (01-04-25 @ 00:56)  WBC Count: 9.39 (01-03-25 @ 16:17)  WBC Count: 4.71 (01-03-25 @ 09:25)  WBC Count: 1.11 (01-03-25 @ 01:31)  WBC Count: 1.35 (01-03-25 @ 00:43)       Bilirubin Total: 1.5 mg/dL (01-09-25 @ 00:31)  Aspartate Aminotransferase (AST/SGOT): 68 U/L (01-09-25 @ 00:31)  Alanine Aminotransferase (ALT/SGPT): 171 U/L (01-09-25 @ 00:31)  Alkaline Phosphatase: 114 U/L (01-09-25 @ 00:31)    Bilirubin Total: 1.9 mg/dL (01-08-25 @ 00:27)  Aspartate Aminotransferase (AST/SGOT): 97 U/L (01-08-25 @ 00:27)  Alanine Aminotransferase (ALT/SGPT): 240 U/L (01-08-25 @ 00:27)  Alkaline Phosphatase: 106 U/L (01-08-25 @ 00:27)

## 2025-01-09 NOTE — CONSULT NOTE ADULT - ASSESSMENT
53 y/o male with PMHx of hypertension, ESRD on HD, CAD (s/p PCI in 4/2024), chronic systolic heart failure, chronic ascites, recent admission for cholecystitis s/p cholecystectomy who was admitted for acute decompensated heart failure and found to have abnormal nuclear stress. LHC showed multi-vessel coronary disease, for which he underwent a CABG (LIMA - LAD | SVG - D1 | SVG - LPL) on 12/30/24. Post-op course complicated by shock requiring IABP + dobutamine, ESBL E. coli bacteremia, new-onset atrial fibrillation (1/6/25). Currently on amiodarone and IV heparin. EP Is consulted for rhythm control.     - New-onset, post-op atrial fibrillation (CHADSVASC = 3)    On my evaluation today, patient had fatigue, cough and shortness of breath with exertion. Denies ongoing chest pain, palpitations, lightheadedness or syncope. Extubated yesterday. Now on BiPAP.    Telemetry from 1/6/25 till today shows atrial fibrillation with HR ~. Currently on dobutamine 5 and IV amiodarone. Off pressors    Of note, has been sub-therapeutic on IV heparin.     RECOMMENDATIONS:  - continue therapeutic anticoagulation (recommend transitioning to PO apixaban 5 mg BID when able from surgical perspective)  - stop IV amiodarone, continue PO amiodarone 400 mg TID (until a 10 gram load)  - please have ICU pharmacist calculate total amiodarone dose thus far  - would check TSH and monitor LFTs while on amiodarone  - outpatient monitoring of PFTs and eyes  - discussed risks/benefits of EDU/DCCV with the patient, but he declines at this time. Would prefer to continue medical management, and re-visit the discussion at a later time  - EP will follow            Neno Somers (PGY 4)  Cardiology Fellow      Of note, these recommendations are preliminary. Case to be discussed with attending. Please see attending attestation for final recommendations.

## 2025-01-09 NOTE — PHYSICAL THERAPY INITIAL EVALUATION ADULT - TRANSFER TRAINING, PT EVAL
GOAL: Pt will independently perform sit to/from stand transfers from various surfaces without LOB using least restrictive device by 2 weeks.
GOAL: Pt will independently perform sit to/from stand transfers from various surfaces without LOB using least restrictive device by 2 weeks.

## 2025-01-09 NOTE — PHYSICAL THERAPY INITIAL EVALUATION ADULT - ADDITIONAL COMMENTS
Pt lives in private house with 5 stairs to enter and no stairs inside. Lives with family. Pt independently performs ADLs and ambulates without AD
Pt lives in private house with 5 stairs to enter and no stairs inside. Lives with family. Pt independently performs ADLs and ambulates without AD

## 2025-01-09 NOTE — PROGRESS NOTE ADULT - SUBJECTIVE AND OBJECTIVE BOX
[facility]    Dr. Nica Styles     Office [phone] (9 am to 5 pm)  Service: [phone] (5pm to 9am)  Also Available on TEAMS      RENAL PROGRESS NOTE: DATE OF SERVICE 01-09-25 @ 11:23    Patient is a 54y old  Male who presents with a chief complaint of CAD s/p CABG (09 Jan 2025 07:21)      Patient seen and examined at bedside. No chest pain/sob    VITALS:  T(F): 99.1 (01-09-25 @ 08:00), Max: 99.3 (01-08-25 @ 20:00)  HR: 99 (01-09-25 @ 10:00)  BP: --  RR: 15 (01-09-25 @ 10:00)  SpO2: 100% (01-09-25 @ 10:00)  Wt(kg): --    01-08 @ 07:01  -  01-09 @ 07:00  --------------------------------------------------------  IN: 2083.4 mL / OUT: 3027 mL / NET: -943.6 mL    01-09 @ 07:01  -  01-09 @ 11:23  --------------------------------------------------------  IN: 285.3 mL / OUT: 0 mL / NET: 285.3 mL          PHYSICAL EXAM:  Constitutional: NAD  Neck: No JVD  Respiratory: CTAB, no wheezes, rales or rhonchi  Cardiovascular: S1, S2, RRR  Gastrointestinal: BS+, soft, NT/ND  Extremities: No peripheral edema    Hospital Medications:   MEDICATIONS  (STANDING):  albuterol/ipratropium for Nebulization 3 milliLiter(s) Nebulizer every 6 hours  aMIOdarone    Tablet   Oral   aMIOdarone    Tablet 400 milliGRAM(s) Oral every 8 hours  aMIOdarone Infusion 0.5 mG/Min (16.7 mL/Hr) IV Continuous <Continuous>  aspirin  chewable 81 milliGRAM(s) Oral daily  bisacodyl Suppository 10 milliGRAM(s) Rectal once  buDESOnide    Inhalation Suspension 0.25 milliGRAM(s) Inhalation every 12 hours  chlorhexidine 2% Cloths 1 Application(s) Topical daily  chlorhexidine 4% Liquid 1 Application(s) Topical <User Schedule>  CRRT Treatment    <Continuous>  dexMEDEtomidine Infusion 0.5 MICROgram(s)/kG/Hr (10.6 mL/Hr) IV Continuous <Continuous>  dextrose 50% Injectable 50 milliLiter(s) IV Push every 15 minutes  dextrose 50% Injectable 25 milliLiter(s) IV Push every 15 minutes  DOBUTamine Infusion 5 MICROgram(s)/kG/Min (6.38 mL/Hr) IV Continuous <Continuous>  epoetin ignacia (EPOGEN) Injectable 79578 Unit(s) IV Push <User Schedule>  gabapentin 100 milliGRAM(s) Oral every 12 hours  heparin  Infusion 300 Unit(s)/Hr (7 mL/Hr) IV Continuous <Continuous>  insulin lispro (ADMELOG) corrective regimen sliding scale   SubCutaneous every 6 hours  lidocaine   4% Patch 2 Patch Transdermal daily  meropenem  IVPB 1000 milliGRAM(s) IV Intermittent every 8 hours  Nephro-elicia 1 Tablet(s) Oral daily  pantoprazole  Injectable 40 milliGRAM(s) IV Push daily  Phoxillum Filtration BK 4 / 2.5 5000 milliLiter(s) (800 mL/Hr) CRRT <Continuous>  Phoxillum Filtration BK 4 / 2.5 5000 milliLiter(s) (200 mL/Hr) CRRT <Continuous>  polyethylene glycol 3350 17 Gram(s) Oral two times a day  PrismaSATE Dialysate BGK 4 / 2.5 5000 milliLiter(s) (1000 mL/Hr) CRRT <Continuous>  senna 2 Tablet(s) Oral at bedtime  sodium chloride 0.9%. 1000 milliLiter(s) (10 mL/Hr) IV Continuous <Continuous>  vasopressin Infusion 0.03 Unit(s)/Min (4.5 mL/Hr) IV Continuous <Continuous>      LABS:  01-09    135  |  103  |  32[H]  ----------------------------<  202[H]  3.7   |  19[L]  |  1.99[H]    Ca    8.0[L]      09 Jan 2025 00:31  Phos  2.6     01-09  Mg     2.4     01-09    TPro  5.7[L]  /  Alb  2.8[L]  /  TBili  1.5[H]  /  DBili      /  AST  68[H]  /  ALT  171[H]  /  AlkPhos  114  01-09    Creatinine Trend: 1.99 <--, 2.00 <--, 2.17 <--, 2.45 <--, 3.23 <--, 3.81 <--, 4.91 <--, 5.93 <--, 6.32 <--, 6.21 <--    Albumin: 2.8 g/dL (01-09 @ 00:31)  Phosphorus: 2.6 mg/dL (01-09 @ 00:31)                              8.3    27.85 )-----------( 82       ( 09 Jan 2025 00:31 )             26.7     Urine Studies:  Urinalysis - [01-09-25 @ 00:31]      Color  / Appearance  / SG  / pH       Gluc 202 / Ketone   / Bili  / Urobili        Blood  / Protein  / Leuk Est  / Nitrite       RBC  / WBC  / Hyaline  / Gran  / Sq Epi  / Non Sq Epi  / Bacteria       Iron 29, TIBC 143, %sat 20      [12-19-24 @ 05:00]  Ferritin 904      [12-19-24 @ 05:00]  TSH 4.75      [12-24-24 @ 06:50]    HBsAg Nonreact      [12-19-24 @ 05:00]      RADIOLOGY & ADDITIONAL STUDIES:

## 2025-01-09 NOTE — PHYSICAL THERAPY INITIAL EVALUATION ADULT - MANUAL MUSCLE TESTING RESULTS, REHAB EVAL
at least 3+/5 x all 4 extremities grossly assessed through AROM/grossly assessed due to
at least 3+/5 x all 4 extremities grossly assessed through AROM/grossly assessed due to

## 2025-01-09 NOTE — CHART NOTE - NSCHARTNOTEFT_GEN_A_CORE
NUTRITION FOLLOW UP NOTE    PATIENT SEEN FOR: ICU follow up    SOURCE: [x] Patient  [x] Current Medical Record  [] RN  [] Family/support person at bedside  [] Patient unavailable/inappropriate  [] Other:    CHART REVIEWED/EVENTS NOTED.  [] No changes to nutrition care plan to note  [x] Nutrition Status:  -s/p CABG 24  -intubated for hypoxia 25; extubated 25  -s/p IABP insertion 25; removed 25  -Cardiogenic and Septic shock  -Hx ESRD on HD, currently on Continuous renal replacement therapy       DIET ORDER:   Diet, NPO with Tube Feed:   Tube Feeding Modality: Nasogastric  Vital 1.5 Jeremias (VITAL1.5RTH)  Total Volume for 24 Hours (mL): 1440  Continuous  Starting Tube Feed Rate {mL per Hour}: 20  Increase Tube Feed Rate by (mL): 10  Until Goal Tube Feed Rate (mL per Hour): 60  Tube Feed Duration (in Hours): 24  Tube Feed Start Time: 13:00  No Carb Prosource TF     Qty per Day:  1 (25)      CURRENT DIET ORDER IS:  [x] Appropriate:  [] Inadequate:  [] Other:    NUTRITION INTAKE/PROVISION:  [] PO:  [x] Enteral Nutrition:  EN Order + Prosource TF Free 1x/day Provides: 1440ml formula, 2250kcal, 112g protein, 1100ml free water; meets 28.8kcal/kg and 1.44g/kg protein based on IBW 172lb/78kg.     Current Pump Rate: 60ml/hr at time of RD visit  5-Day Average Enteral Provision per RN flowsheet: 478 mL, 860 kcals, 39 g protein   [] Parenteral Nutrition:    ANTHROPOMETRICS:  Drug Dosing Weight  Height (cm): 180.3 (30 Dec 2024 12:01)  Weight (kg): 85.1 (30 Dec 2024 12:01)  BMI (kg/m2): 26.2 (30 Dec 2024 12:01)  Weights:   Daily Weight in k.6 (), 93.4 (), 87.9 (), 85 (), 86.2 (), 90 (), 84 (), 77.5 (), 79.5 (), 80.4 (), 87.8 (), 88.3 (, standing), 85.3 (, standing), 85 (, standing), 79.7 (), 80.9 (, standing), 81.3 (, standing), 82.6 ()    NUTRITIONALLY PERTINENT MEDICATIONS:  MEDICATIONS  (STANDING):  aMIOdarone    Tablet  aMIOdarone    Tablet  aMIOdarone Infusion  bisacodyl Suppository  dextrose 50% Injectable  dextrose 50% Injectable  DOBUTamine Infusion  insulin lispro (ADMELOG) corrective regimen sliding scale  meropenem  IVPB  Nephro-elicia  pantoprazole  Injectable  polyethylene glycol 3350  senna  sodium chloride 0.9%.  vasopressin Infusion       NUTRITIONALLY PERTINENT LABS:   Na135 mmol/L Glu 202 mg/dL[H] K+ 3.7 mmol/L Cr  1.99 mg/dL[H] BUN 32 mg/dL[H]  Phos 2.6 mg/dL  Alb 2.8 g/dL[L]  U/L[H] AST 68 U/L[H] Alkaline Phosphatase 114 U/L  24 @ 06:50 a1c 5.6    A1C with Estimated Average Glucose Result: 5.6 % (24 @ 06:50)          Finger Sticks:  POCT Blood Glucose.: 107 mg/dL ( @ 05:03)  POCT Blood Glucose.: 195 mg/dL ( @ 23:57)  POCT Blood Glucose.: 228 mg/dL ( @ 17:05)  POCT Blood Glucose.: 172 mg/dL ( @ 11:23)      NUTRITIONALLY PERTINENT MEDICATIONS/LABS:  [x] Reviewed  [x] Relevant notes on medications/labs:  -sliding scale insulin for glycemic control  -dobutamine  -Vasopressin @ 0    EDEMA:  [x] Reviewed  [x] Relevant notes: No noted edema as per flowsheets.     GI/ I&O:  [x] Reviewed  [x] Relevant notes: Patient reports no current GI distress  [] Other:    SKIN:   [] No pressure injuries documented, per nursing flowsheet  [] Pressure injury previously noted  [x] Change in pressure injury documentation:  -Per Wound Care :  Sacral region/BL buttocks deep tissue pressure injury   Rt upper back deep tissue pressure injury   [x] Other:  -midsternal surgical incision from CABG     ESTIMATED NEEDS:  [x] No change:  [] Updated:  Energy:  0802-1520 kcal/day (28-33 kcal/kg)  Protein:  109-140 g/day (1.4-1.8 g/kg)  Fluid:   ml/day or [x] defer to team  Based on: IBW 172lb/78kg    NUTRITION DIAGNOSIS:  [x] Prior Dx: Inadequate energy intake, Increased Nutrient Needs (protein-energy, micronutrients)   [] New Dx:    EDUCATION:  [] Yes:  [x] Not appropriate/warranted    NUTRITION CARE PLAN:  1. Enteral nutrition: Continue Vital 1.5 @ 60ml/hr x 24hr + Prosource TF Free 1x/day. At goal rate provides 1440ml formula, 2250kcal, 112g protein, 1100ml free water; meets 28.8kcal/kg and 1.44g/kg protein based on IBW 172lb/78kg. Defer free water flushes to medical team.   2. Multivitamin/mineral supplementation: Continue nephro-elicia for wound healing, recommend adding Vitamin C while patient on Continuous renal replacement therapy and pending no medical contraindications.  3. If po diet becomes warranted, recommend Consistent Carbohydrate diet + Nepro x 1/day (provides 425 kcal, 19 g protein), with texture/consistency deferred to speech language pathologist/medical team.     [] Achieved - Continue current nutrition intervention(s)  [] Current medical condition precludes nutrition intervention at this time.    MONITORING AND EVALUATION:   RD remains available upon request and will follow up per protocol.    Corinne Lima RDN, CDN - available via [place] TEAMS

## 2025-01-09 NOTE — PROGRESS NOTE ADULT - ASSESSMENT
54M with PMH of HTN, HLD, ESRD on HD MWF via LUE AVF, ascites (requiring repeat paracenteses q4-6wks), NICM with moderate LV dysfunction w/ EF 35-40%() and CAD s/p atherectomy + SALLIE to D1(2024) presents to [facility] transferred from [facility]. Patient initially presented to [facility] ED with shortness of breath. Of note he was recently admitted from - for acute cholecystitis s/p cholecystectomy. In [facility] ED, pt was hypoxic to 86% on RA, trop 1.7K, EKG no new changes, CXR fluid overload. Admitted for AHRF 2/2 fluid overload. Pt received HD on , ,  and . DAPT was resumed, TTE showed improvement in LVEF to 40-45%. Stress test was abnormal with 1mm inferior STD, reversible ischemia in the inferior wall and fixed defects in the lateral, anterior and apical walls with post stress EF of 24%.     Pt was then transferred to [facility] for cardiac cath which showed pLAD 70% ISR, mLCx 70%, D1 severe dz.     Patient now transferred to [facility] CTS Dr. Rivers for CABG eval.      .  # CAD s/p NSTEMI  Hx CAD s/p PCI LAD   Presented with ADHF elevated troponins  Positive NST  -Cath- pLAD 70% ISR, mLCx 70%, D1 severe dz.   TTE EF 35% Severe TRWas on Plavix-p2y12- 321 been off Plavix since -POD#1-C3L free LIMA-LAD; SVG-Diag; QEX-fxegGZ-Zqwjbbmpg on  Sinus rhythm,Amio for AF prophylaxis  -OOB off  Sinus rhythm   -POD#3-On /pressors-EF 25-30% RV failure with transaminitis-Sinus Rhythm  -POD#4-Was intubated for hypoxia-gradual hypotension on /pressors had IABP inserted this morning  -POD#5-s/p IABP insertion, sepsis/septic shock due to GNR/ECOLI HD cath placed   Bronched yesterday 1/3 - minimal secretions with rust-tinged sputum   Paracentesis for suspected bacterial peritonitis-No organisms noted  Transfused 1u pbrcs overnight  Remains on inotropes and pressors with IABP at 1:1  ESBL-E Coli bacteremia on meropenam    - POD#7 Atrial Fib ,remains intubated weaning IABP 1:3,off pressors on CRRT  -IABP removed.Remains on vent-Alex 10ppm,off Levo- CVP 10 / MAP 71 / Hct 25.6% / Lactate 1.7-Atrial Fibrillation  -Intubated on Alex 10ppm, gtt, BP better-AF~~90's on Amio-had bronch still febrile Tmax 101  -Extubated awake alert on HFNC AF,on Dobutrex-CRRT,Cultures no growth      # Acute on Chronic Systolic Heart Failure  EF-35% ,severe TR  Had HD post op  GDMT post op  LVEF improved post bypass but now at 23-30%-BiV dysfunction on  now off pressors  -TTE EF 40%,trace effusion  ECG c/w pericarditis-on colchicine     Vascular evaluated  AVGraft /?steal causing RV failure-'' Very low suspicion of steal Sd and AVF causing current clinical state. ''   VA Duplex Hemodialysis Access, Left (25 @ 13:35) >   Arterial flow volume of 1301 mL/m, and the venous flow volume of  1492 mL/m are consistent with a normally functioning dialysis access  fistula.      # Ascites  Hx chronic ascites  Has drainage q4-6 weeks  Last drained -4600mL  ? ascites related to severe TR  Had znveoxfmwrem-Nqpueag-hg growth   Monitor      # ESRD  Now on CRRT

## 2025-01-09 NOTE — PHYSICAL THERAPY INITIAL EVALUATION ADULT - BED MOBILITY TRAINING, PT EVAL
GOAL: Pt will independently perform all aspects of bed mobility to help prevent pressure ulcers, by 2 weeks
GOAL: Pt will independently perform all aspects of bed mobility to help prevent pressure ulcers, by 2 weeks

## 2025-01-09 NOTE — PROGRESS NOTE ADULT - ASSESSMENT
54M with PMH of HTN, HLD, ESRD on HD MWF via LUE AVF, ascites (requiring repeat paracenteses q4-6wks), NICM with moderate LV dysfunction w/ EF 35-40%(2023) and CAD s/p atherectomy + SALLIE to D1(4/11/2024) presents to [facility] transferred from [facility] for CABG eval  Nephro on [facility] for HD    ESRD on HD   Regular schedule MWF   Access LUE AVF  [facility]  Nephrologist Dr. Bailey  Last HD on 12/24  S/p HD on 12/27 12/29, 12/30 for hyperkalemia and 12/31  2 L PUF On 01/02/2024  However hospital course now complicated by Cardiogenic shock now on multiple pressors,   CRRT initiated 01/03, off since 01/08   Will transition to HD from [facility]  Keep MAP>65  Renal Diet  Consent in physical chart    Hyper/Hypokalemia  Monitor K, will change dialysate fluid as needed    HTN  currently on pressure support  monitor bp     CKD MBD  Can send a PTH  Ca and Phos daily    Anemia  CRISTIANE with HD  Transfuse if hgb <7    CAD with multivessel disease  s/p CABG 12/30  CTICU following

## 2025-01-10 ENCOUNTER — RESULT REVIEW (OUTPATIENT)
Age: 55
End: 2025-01-10

## 2025-01-10 LAB
ALBUMIN SERPL ELPH-MCNC: 2.9 G/DL — LOW (ref 3.3–5)
ALP SERPL-CCNC: 131 U/L — HIGH (ref 40–120)
ALT FLD-CCNC: 128 U/L — HIGH (ref 10–45)
ANION GAP SERPL CALC-SCNC: 14 MMOL/L — SIGNIFICANT CHANGE UP (ref 5–17)
ANISOCYTOSIS BLD QL: SIGNIFICANT CHANGE UP
APTT BLD: 32.2 SEC — SIGNIFICANT CHANGE UP (ref 24.5–35.6)
APTT BLD: 34.5 SEC — SIGNIFICANT CHANGE UP (ref 24.5–35.6)
APTT BLD: 35.6 SEC — SIGNIFICANT CHANGE UP (ref 24.5–35.6)
APTT BLD: 51.6 SEC — HIGH (ref 24.5–35.6)
AST SERPL-CCNC: 63 U/L — HIGH (ref 10–40)
BASOPHILS # BLD AUTO: 0 K/UL — SIGNIFICANT CHANGE UP (ref 0–0.2)
BASOPHILS NFR BLD AUTO: 0 % — SIGNIFICANT CHANGE UP (ref 0–2)
BILIRUB SERPL-MCNC: 1.1 MG/DL — SIGNIFICANT CHANGE UP (ref 0.2–1.2)
BUN SERPL-MCNC: 42 MG/DL — HIGH (ref 7–23)
BURR CELLS BLD QL SMEAR: PRESENT — SIGNIFICANT CHANGE UP
CALCIUM SERPL-MCNC: 8.3 MG/DL — LOW (ref 8.4–10.5)
CHLORIDE SERPL-SCNC: 103 MMOL/L — SIGNIFICANT CHANGE UP (ref 96–108)
CO2 SERPL-SCNC: 18 MMOL/L — LOW (ref 22–31)
CREAT SERPL-MCNC: 2.98 MG/DL — HIGH (ref 0.5–1.3)
CULTURE RESULTS: SIGNIFICANT CHANGE UP
CULTURE RESULTS: SIGNIFICANT CHANGE UP
DACRYOCYTES BLD QL SMEAR: SLIGHT — SIGNIFICANT CHANGE UP
EGFR: 24 ML/MIN/1.73M2 — LOW
EGFR: 24 ML/MIN/1.73M2 — LOW
EOSINOPHIL # BLD AUTO: 0.3 K/UL — SIGNIFICANT CHANGE UP (ref 0–0.5)
EOSINOPHIL NFR BLD AUTO: 0.9 % — SIGNIFICANT CHANGE UP (ref 0–6)
GAS PNL BLDA: SIGNIFICANT CHANGE UP
GAS PNL BLDA: SIGNIFICANT CHANGE UP
GAS PNL BLDV: SIGNIFICANT CHANGE UP
GLUCOSE SERPL-MCNC: 217 MG/DL — HIGH (ref 70–99)
HCT VFR BLD CALC: 27.7 % — LOW (ref 39–50)
HGB BLD-MCNC: 8.5 G/DL — LOW (ref 13–17)
LYMPHOCYTES # BLD AUTO: 0.3 K/UL — LOW (ref 1–3.3)
LYMPHOCYTES # BLD AUTO: 0.9 % — LOW (ref 13–44)
MACROCYTES BLD QL: SIGNIFICANT CHANGE UP
MAGNESIUM SERPL-MCNC: 2.5 MG/DL — SIGNIFICANT CHANGE UP (ref 1.6–2.6)
MANUAL SMEAR VERIFICATION: SIGNIFICANT CHANGE UP
MCHC RBC-ENTMCNC: 29.7 PG — SIGNIFICANT CHANGE UP (ref 27–34)
MCHC RBC-ENTMCNC: 30.7 G/DL — LOW (ref 32–36)
MCV RBC AUTO: 96.9 FL — SIGNIFICANT CHANGE UP (ref 80–100)
MONOCYTES # BLD AUTO: 0.87 K/UL — SIGNIFICANT CHANGE UP (ref 0–0.9)
MONOCYTES NFR BLD AUTO: 2.6 % — SIGNIFICANT CHANGE UP (ref 2–14)
NEUTROPHILS NFR BLD AUTO: 95.6 % — HIGH (ref 43–77)
OVALOCYTES BLD QL SMEAR: SLIGHT — SIGNIFICANT CHANGE UP
PHOSPHATE SERPL-MCNC: 3.3 MG/DL — SIGNIFICANT CHANGE UP (ref 2.5–4.5)
PLAT MORPH BLD: NORMAL — SIGNIFICANT CHANGE UP
PLATELET # BLD AUTO: 131 K/UL — LOW (ref 150–400)
POIKILOCYTOSIS BLD QL AUTO: SLIGHT — SIGNIFICANT CHANGE UP
POLYCHROMASIA BLD QL SMEAR: SLIGHT — SIGNIFICANT CHANGE UP
POTASSIUM SERPL-MCNC: 4.3 MMOL/L — SIGNIFICANT CHANGE UP (ref 3.5–5.3)
POTASSIUM SERPL-SCNC: 4.3 MMOL/L — SIGNIFICANT CHANGE UP (ref 3.5–5.3)
PROT SERPL-MCNC: 6 G/DL — SIGNIFICANT CHANGE UP (ref 6–8.3)
RBC # BLD: 2.86 M/UL — LOW (ref 4.2–5.8)
RBC # FLD: 19.5 % — HIGH (ref 10.3–14.5)
RBC BLD AUTO: ABNORMAL
SCHISTOCYTES BLD QL AUTO: SLIGHT — SIGNIFICANT CHANGE UP
SODIUM SERPL-SCNC: 135 MMOL/L — SIGNIFICANT CHANGE UP (ref 135–145)
SPECIMEN SOURCE: SIGNIFICANT CHANGE UP
SPECIMEN SOURCE: SIGNIFICANT CHANGE UP
WBC # BLD: 33.53 K/UL — HIGH (ref 3.8–10.5)

## 2025-01-10 PROCEDURE — G0545: CPT

## 2025-01-10 PROCEDURE — 99291 CRITICAL CARE FIRST HOUR: CPT

## 2025-01-10 PROCEDURE — 93010 ELECTROCARDIOGRAM REPORT: CPT

## 2025-01-10 PROCEDURE — 92960 CARDIOVERSION ELECTRIC EXT: CPT | Mod: 59

## 2025-01-10 PROCEDURE — 99292 CRITICAL CARE ADDL 30 MIN: CPT

## 2025-01-10 PROCEDURE — 93312 ECHO TRANSESOPHAGEAL: CPT | Mod: 26

## 2025-01-10 PROCEDURE — 71045 X-RAY EXAM CHEST 1 VIEW: CPT | Mod: 26

## 2025-01-10 PROCEDURE — 74177 CT ABD & PELVIS W/CONTRAST: CPT | Mod: 26

## 2025-01-10 PROCEDURE — 71260 CT THORAX DX C+: CPT | Mod: 26

## 2025-01-10 PROCEDURE — 99233 SBSQ HOSP IP/OBS HIGH 50: CPT

## 2025-01-10 PROCEDURE — 99291 CRITICAL CARE FIRST HOUR: CPT | Mod: 25

## 2025-01-10 PROCEDURE — 31641 BRONCHOSCOPY TREAT BLOCKAGE: CPT

## 2025-01-10 PROCEDURE — 93320 DOPPLER ECHO COMPLETE: CPT | Mod: 26

## 2025-01-10 PROCEDURE — 93325 DOPPLER ECHO COLOR FLOW MAPG: CPT | Mod: 26

## 2025-01-10 RX ORDER — MIDODRINE HYDROCHLORIDE 5 MG/1
10 TABLET ORAL EVERY 8 HOURS
Refills: 0 | Status: DISCONTINUED | OUTPATIENT
Start: 2025-01-10 | End: 2025-01-14

## 2025-01-10 RX ORDER — MIDAZOLAM IN 0.9 % SOD.CHLORID 1 MG/ML
2 PLASTIC BAG, INJECTION (ML) INTRAVENOUS ONCE
Refills: 0 | Status: DISCONTINUED | OUTPATIENT
Start: 2025-01-10 | End: 2025-01-10

## 2025-01-10 RX ORDER — LIDOCAINE HCL/PF 10 MG/ML
8 VIAL (ML) INJECTION ONCE
Refills: 0 | Status: COMPLETED | OUTPATIENT
Start: 2025-01-10 | End: 2025-01-10

## 2025-01-10 RX ORDER — MEROPENEM 1 G/30ML
500 INJECTION INTRAVENOUS EVERY 24 HOURS
Refills: 0 | Status: COMPLETED | OUTPATIENT
Start: 2025-01-10 | End: 2025-01-17

## 2025-01-10 RX ORDER — KETAMINE HCL IN 0.9 % NACL 50 MG/5 ML
60 SYRINGE (ML) INTRAVENOUS ONCE
Refills: 0 | Status: DISCONTINUED | OUTPATIENT
Start: 2025-01-10 | End: 2025-01-10

## 2025-01-10 RX ADMIN — GABAPENTIN 100 MILLIGRAM(S): 400 CAPSULE ORAL at 17:28

## 2025-01-10 RX ADMIN — POLYETHYLENE GLYCOL 3350 17 GRAM(S): 17 POWDER, FOR SOLUTION ORAL at 05:52

## 2025-01-10 RX ADMIN — Medication 4 MILLILITER(S): at 10:55

## 2025-01-10 RX ADMIN — Medication 25 MICROGRAM(S): at 02:36

## 2025-01-10 RX ADMIN — Medication 4 MILLILITER(S): at 17:22

## 2025-01-10 RX ADMIN — AMIODARONE HYDROCHLORIDE 400 MILLIGRAM(S): 50 INJECTION, SOLUTION INTRAVENOUS at 16:08

## 2025-01-10 RX ADMIN — Medication 25 MICROGRAM(S): at 03:00

## 2025-01-10 RX ADMIN — HEPARIN SODIUM 11 UNIT(S)/HR: 1000 INJECTION INTRAVENOUS; SUBCUTANEOUS at 07:20

## 2025-01-10 RX ADMIN — IPRATROPIUM BROMIDE AND ALBUTEROL SULFATE 3 MILLILITER(S): .5; 2.5 SOLUTION RESPIRATORY (INHALATION) at 23:24

## 2025-01-10 RX ADMIN — Medication 25 MICROGRAM(S): at 05:52

## 2025-01-10 RX ADMIN — AMIODARONE HYDROCHLORIDE 400 MILLIGRAM(S): 50 INJECTION, SOLUTION INTRAVENOUS at 05:52

## 2025-01-10 RX ADMIN — Medication 25 MICROGRAM(S): at 22:15

## 2025-01-10 RX ADMIN — Medication 25 MICROGRAM(S): at 22:45

## 2025-01-10 RX ADMIN — Medication 1 APPLICATION(S): at 06:16

## 2025-01-10 RX ADMIN — Medication 60 MILLIGRAM(S): at 12:00

## 2025-01-10 RX ADMIN — Medication 40 MILLIGRAM(S): at 11:16

## 2025-01-10 RX ADMIN — LIDOCAINE HYDROCHLORIDE 2 PATCH: 20 JELLY TOPICAL at 23:44

## 2025-01-10 RX ADMIN — Medication 2 MILLIGRAM(S): at 12:00

## 2025-01-10 RX ADMIN — Medication 4 MILLILITER(S): at 23:24

## 2025-01-10 RX ADMIN — GABAPENTIN 100 MILLIGRAM(S): 400 CAPSULE ORAL at 05:52

## 2025-01-10 RX ADMIN — VASOPRESSIN 4.5 UNIT(S)/MIN: 20 INJECTION INTRAVENOUS at 07:20

## 2025-01-10 RX ADMIN — LIDOCAINE HYDROCHLORIDE 2 PATCH: 20 JELLY TOPICAL at 19:30

## 2025-01-10 RX ADMIN — INSULIN LISPRO 4: 100 INJECTION, SOLUTION INTRAVENOUS; SUBCUTANEOUS at 11:21

## 2025-01-10 RX ADMIN — IPRATROPIUM BROMIDE AND ALBUTEROL SULFATE 3 MILLILITER(S): .5; 2.5 SOLUTION RESPIRATORY (INHALATION) at 17:22

## 2025-01-10 RX ADMIN — Medication 25 MICROGRAM(S): at 06:16

## 2025-01-10 RX ADMIN — IPRATROPIUM BROMIDE AND ALBUTEROL SULFATE 3 MILLILITER(S): .5; 2.5 SOLUTION RESPIRATORY (INHALATION) at 05:51

## 2025-01-10 RX ADMIN — IPRATROPIUM BROMIDE AND ALBUTEROL SULFATE 3 MILLILITER(S): .5; 2.5 SOLUTION RESPIRATORY (INHALATION) at 10:55

## 2025-01-10 RX ADMIN — DOBUTAMINE 6.38 MICROGRAM(S)/KG/MIN: 250 INJECTION INTRAVENOUS at 07:20

## 2025-01-10 RX ADMIN — BUDESONIDE 0.25 MILLIGRAM(S): 0.25 SUSPENSION RESPIRATORY (INHALATION) at 17:34

## 2025-01-10 RX ADMIN — MEROPENEM 100 MILLIGRAM(S): 1 INJECTION INTRAVENOUS at 21:56

## 2025-01-10 RX ADMIN — LIDOCAINE HYDROCHLORIDE 2 PATCH: 20 JELLY TOPICAL at 11:17

## 2025-01-10 RX ADMIN — INSULIN LISPRO 4: 100 INJECTION, SOLUTION INTRAVENOUS; SUBCUTANEOUS at 00:56

## 2025-01-10 RX ADMIN — Medication 1 APPLICATION(S): at 21:56

## 2025-01-10 RX ADMIN — Medication 2 TABLET(S): at 21:56

## 2025-01-10 RX ADMIN — LIDOCAINE HYDROCHLORIDE 2 PATCH: 20 JELLY TOPICAL at 00:00

## 2025-01-10 RX ADMIN — BUDESONIDE 0.25 MILLIGRAM(S): 0.25 SUSPENSION RESPIRATORY (INHALATION) at 05:51

## 2025-01-10 RX ADMIN — Medication 8 MILLILITER(S): at 10:33

## 2025-01-10 RX ADMIN — AMIODARONE HYDROCHLORIDE 400 MILLIGRAM(S): 50 INJECTION, SOLUTION INTRAVENOUS at 21:55

## 2025-01-10 NOTE — PROGRESS NOTE ADULT - ASSESSMENT
54M with PMH of HTN, HLD, ESRD on HD MWF via LUE AVF, ascites (requiring repeat paracenteses q4-6wks), NICM with moderate LV dysfunction w/ EF 35-40%() and CAD s/p atherectomy + SALLIE to D1(2024) presents to [facility] transferred from [facility]. Patient initially presented to [facility] ED with shortness of breath. Of note he was recently admitted from - for acute cholecystitis s/p cholecystectomy. In [facility] ED, pt was hypoxic to 86% on RA, trop 1.7K, EKG no new changes, CXR fluid overload. Admitted for AHRF 2/2 fluid overload. Pt received HD on , ,  and . DAPT was resumed, TTE showed improvement in LVEF to 40-45%. Stress test was abnormal with 1mm inferior STD, reversible ischemia in the inferior wall and fixed defects in the lateral, anterior and apical walls with post stress EF of 24%.     Pt was then transferred to [facility] for cardiac cath which showed pLAD 70% ISR, mLCx 70%, D1 severe dz.     Patient now transferred to [facility] CTS Dr. Rivers for CABG eval.      .  # CAD s/p NSTEMI  Hx CAD s/p PCI LAD   Presented with ADHF elevated troponins  Positive NST  -Cath- pLAD 70% ISR, mLCx 70%, D1 severe dz.   TTE EF 35% Severe TRWas on Plavix-p2y12- 321 been off Plavix since -POD#1-C3L free LIMA-LAD; SVG-Diag; RGV-flqzXL-Lpisxppei on  Sinus rhythm,Amio for AF prophylaxis  -OOB off  Sinus rhythm   -POD#3-On /pressors-EF 25-30% RV failure with transaminitis-Sinus Fpcnrp87/03-POD#4-Was intubated for hypoxia-gradual hypotension on /pressors had IABP inserted this morning  -POD#5-s/p IABP insertion, sepsis/septic shock due to GNR/ECOLI HD cath placed   Bronched yesterday 1/3 - minimal secretions with rust-tinged sputum     ESBL-E Coli bacteremia on meropenam    - POD#7 Atrial Fib ,remains intubated weaning IABP 1:3,off pressors on CRRT  -IABP removed.Remains on vent-Alex 10ppm,off Levo- CVP 10 / MAP 71 / Hct 25.6% / Lactate 1.7-Atrial Fibrillation  -Intubated on Alex 10ppm, gtt, BP better-AF~~90's on Amio-had bronch still febrile Tmax 101  -Extubated awake alert on HFNC AF,on Dobutrex-CRRT,Cultures no growth    01/10-OOB on BiPAP Sinus Rhythm on -SR/ MAP 57/ CVP 10/  Hct 26.7 / Lact 1.4    # Acute on Chronic Systolic Heart Failure  EF-35% ,severe TR  Had HD post op  GDMT post op  LVEF improved post bypass but now at 23-30%-BiV dysfunction on  now off pressors  -TTE EF 40%,trace effusion  ECG c/w pericarditis-on colchicine     Vascular evaluated  AVGraft /?steal causing RV failure-'' Very low suspicion of steal Sd and AVF causing current clinical state. ''   VA Duplex Hemodialysis Access, Left (25 @ 13:35) >   Arterial flow volume of 1301 mL/m, and the venous flow volume of  1492 mL/m are consistent with a normally functioning dialysis access  fistula.        # Ascites  Hx chronic ascites  Has drainage q4-6 weeks  Last drained -4600mL  ? ascites related to severe TR  Had qvsdqmveymyu-Sotlicw-bn growth   Monitor      # ESRD  Now on CRRT

## 2025-01-10 NOTE — PROGRESS NOTE ADULT - SUBJECTIVE AND OBJECTIVE BOX
[facility]    Dr. Nica Styles     Office [phone] (9 am to 5 pm)  Service: [phone] (5pm to 9am)  Also Available on TEAMS      RENAL PROGRESS NOTE: DATE OF SERVICE 01-10-25 @ 15:20    Patient is a 54y old  Male who presents with a chief complaint of CAD s/p CABG (10 Aquilino 2025 06:24)      Patient seen and examined at bedside. No chest pain/sob    VITALS:  T(F): 97.9 (01-10-25 @ 12:00), Max: 98.6 (01-09-25 @ 16:00)  HR: 80 (01-10-25 @ 15:00)  BP: 110/57 (01-09-25 @ 20:30)  RR: 23 (01-10-25 @ 15:00)  SpO2: 100% (01-10-25 @ 15:00)  Wt(kg): --    01-09 @ 07:01  -  01-10 @ 07:00  --------------------------------------------------------  IN: 2831.6 mL / OUT: 2700 mL / NET: 131.6 mL    01-10 @ 07:01  -  01-10 @ 15:20  --------------------------------------------------------  IN: 204.7 mL / OUT: 0 mL / NET: 204.7 mL          PHYSICAL EXAM:  Constitutional: NAD  Neck: No JVD  Respiratory: CTAB, no wheezes, rales or rhonchi  Cardiovascular: S1, S2, RRR  Gastrointestinal: BS+, soft, NT/ND  Extremities: No peripheral edema    Hospital Medications:   MEDICATIONS  (STANDING):  albuterol/ipratropium for Nebulization 3 milliLiter(s) Nebulizer every 6 hours  aMIOdarone    Tablet   Oral   aMIOdarone    Tablet 400 milliGRAM(s) Oral every 8 hours  aspirin  chewable 81 milliGRAM(s) Oral daily  bisacodyl Suppository 10 milliGRAM(s) Rectal once  buDESOnide    Inhalation Suspension 0.25 milliGRAM(s) Inhalation every 12 hours  chlorhexidine 2% Cloths 1 Application(s) Topical daily  chlorhexidine 4% Liquid 1 Application(s) Topical <User Schedule>  dexMEDEtomidine Infusion 0.5 MICROgram(s)/kG/Hr (10.6 mL/Hr) IV Continuous <Continuous>  dextrose 50% Injectable 50 milliLiter(s) IV Push every 15 minutes  dextrose 50% Injectable 25 milliLiter(s) IV Push every 15 minutes  DOBUTamine Infusion 5 MICROgram(s)/kG/Min (6.38 mL/Hr) IV Continuous <Continuous>  epoetin ignacia (EPOGEN) Injectable 44395 Unit(s) IV Push <User Schedule>  gabapentin 100 milliGRAM(s) Oral every 12 hours  heparin  Infusion 300 Unit(s)/Hr (14 mL/Hr) IV Continuous <Continuous>  insulin lispro (ADMELOG) corrective regimen sliding scale   SubCutaneous every 6 hours  lidocaine   4% Patch 2 Patch Transdermal daily  meropenem  IVPB 500 milliGRAM(s) IV Intermittent every 24 hours  Nephro-elicia 1 Tablet(s) Oral daily  pantoprazole  Injectable 40 milliGRAM(s) IV Push daily  polyethylene glycol 3350 17 Gram(s) Oral two times a day  senna 2 Tablet(s) Oral at bedtime  sodium chloride 0.9%. 1000 milliLiter(s) (10 mL/Hr) IV Continuous <Continuous>  sodium chloride 3%  Inhalation 4 milliLiter(s) Inhalation every 6 hours  vasopressin Infusion 0.03 Unit(s)/Min (4.5 mL/Hr) IV Continuous <Continuous>      LABS:  01-10    135  |  103  |  42[H]  ----------------------------<  217[H]  4.3   |  18[L]  |  2.98[H]    Ca    8.3[L]      10 Aquilino 2025 00:59  Phos  3.3     01-10  Mg     2.5     01-10    TPro  6.0  /  Alb  2.9[L]  /  TBili  1.1  /  DBili      /  AST  63[H]  /  ALT  128[H]  /  AlkPhos  131[H]  01-10    Creatinine Trend: 2.98 <--, 1.99 <--, 2.00 <--, 2.17 <--, 2.45 <--, 3.23 <--, 3.81 <--, 4.91 <--, 5.93 <--    Albumin: 2.9 g/dL (01-10 @ 00:59)  Phosphorus: 3.3 mg/dL (01-10 @ 00:59)                              8.5    33.53 )-----------( 131      ( 10 Aquilino 2025 00:59 )             27.7     Urine Studies:  Urinalysis - [01-10-25 @ 00:59]      Color  / Appearance  / SG  / pH       Gluc 217 / Ketone   / Bili  / Urobili        Blood  / Protein  / Leuk Est  / Nitrite       RBC  / WBC  / Hyaline  / Gran  / Sq Epi  / Non Sq Epi  / Bacteria       Iron 29, TIBC 143, %sat 20      [12-19-24 @ 05:00]  Ferritin 904      [12-19-24 @ 05:00]  TSH 4.75      [12-24-24 @ 06:50]    HBsAg Nonreact      [12-19-24 @ 05:00]      RADIOLOGY & ADDITIONAL STUDIES:

## 2025-01-10 NOTE — PROGRESS NOTE ADULT - SUBJECTIVE AND OBJECTIVE BOX
MR#[id number]  PATIENT NAME:RAAD CANO    DATE OF SERVICE: 01-10-25 @ 06:25  Patient was seen and examined by Solo Quick MD on    01-10-25 @ 06:25 .  Interim events noted.Consultant notes ,Labs,Telemetry reviewed by me       HOSPITAL COURSE: HPI:  54M with PMH of HTN, HLD, ESRD on HD MWF via LUE AVF, ascites (requiring repeat paracenteses q4-6wks), NICM with moderate LV dysfunction w/ EF 35-40%() and CAD s/p atherectomy + SALLIE to D1(2024) presents to [facility] transferred from [facility]. [facility] with shortness of breath. Of note he was recently admitted from - for acute cholecystitis s/p cholecystectomy. In [facility] ED, pt was hypoxic to 86% on RA, trop 1.7K, EKG no new changes, CXR fluid overload. Admitted for AHRF 2/2 fluid overload. Pt received HD on , ,  and . DAPT was resumed, TTE showed improvement in LVEF to 40-45%. Stress test was abnormal with 1mm inferior STD, reversible ischemia in the inferior wall and fixed defects in the lateral, anterior and apical walls with post stress EF of 24%. Pt was then transferred to [facility] for cardiac cath which showed pLAD 70% ISR, mLCx 70%, D1 severe dz. Patient now transferred to [facility] CTS Dr. Rivers for CABG eval. Patient denies any current SOB or chest pain on admission. Otherwise denies headaches, abdominal pain, urinary or bowel changes, fevers, chills, N/V/D, sick contacts.  (24 Dec 2024 05:07)      INTERIM EVENTS:Patient seen at bedside ,interim events noted.     Cardiac cath  pLAD 70% ISR, mLCx 70%, D1 severe dz.   -POD#1-C3L free LIMA-LAD; SVG-Diag; SVG-leftPL Awake OOB extubated Sinus rhythm on Dobutamine gtt  - Was intubated last night for airway protection s/p bronchoscopy This morning had IABP inserted  remains sedated intubated Sinus Rhythm  - s/p IABP insertion, sepsis/septic shock due to GNR/ECOLI -Paracentesis for suspected bacterial peritonitis  Transfused 1u pbrcs overnight  Remains on inoptropes and pressors with IABP at 1:1  -Intunated on IABP 1:1,Alex@20ppm,on ,weaning pressors  -POD#7-Remains intubated on CRRT-IABP 1:3,off pressors on Dobutrex in Atrial Fibrillation  -Intibated on Alex 10ppm,IABP dce'd,off Levophed,remains on Dobutrex-Atrial Fibrillation  -Remains intubated had bronch earlier for cupious secretions-On Dobutrex,AF~~90's On CRRT  -Extubated on HFNC,no dyspnea alert remains in AF  01/10-OOB Awake  on BiPAP +,Sinus Rhythm      MEDICATIONS  (STANDING):  albuterol/ipratropium for Nebulization 3 milliLiter(s) Nebulizer every 6 hours  aMIOdarone    Tablet   Oral   aMIOdarone    Tablet 400 milliGRAM(s) Oral every 8 hours  aspirin  chewable 81 milliGRAM(s) Oral daily  bisacodyl Suppository 10 milliGRAM(s) Rectal once  buDESOnide    Inhalation Suspension 0.25 milliGRAM(s) Inhalation every 12 hours  chlorhexidine 2% Cloths 1 Application(s) Topical daily  chlorhexidine 4% Liquid 1 Application(s) Topical <User Schedule>  dexMEDEtomidine Infusion 0.5 MICROgram(s)/kG/Hr (10.6 mL/Hr) IV Continuous <Continuous>  dextrose 50% Injectable 50 milliLiter(s) IV Push every 15 minutes  dextrose 50% Injectable 25 milliLiter(s) IV Push every 15 minutes  DOBUTamine Infusion 5 MICROgram(s)/kG/Min (6.38 mL/Hr) IV Continuous <Continuous>  epoetin ignacia (EPOGEN) Injectable 42543 Unit(s) IV Push <User Schedule>  gabapentin 100 milliGRAM(s) Oral every 12 hours  heparin  Infusion 300 Unit(s)/Hr (11 mL/Hr) IV Continuous <Continuous>  insulin lispro (ADMELOG) corrective regimen sliding scale   SubCutaneous every 6 hours  lidocaine   4% Patch 2 Patch Transdermal daily  meropenem  IVPB 500 milliGRAM(s) IV Intermittent every 24 hours  Nephro-elicia 1 Tablet(s) Oral daily  pantoprazole  Injectable 40 milliGRAM(s) IV Push daily  polyethylene glycol 3350 17 Gram(s) Oral two times a day  senna 2 Tablet(s) Oral at bedtime  sodium chloride 0.9%. 1000 milliLiter(s) (10 mL/Hr) IV Continuous <Continuous>  sodium chloride 3%  Inhalation 4 milliLiter(s) Inhalation every 6 hours  vasopressin Infusion 0.03 Unit(s)/Min (4.5 mL/Hr) IV Continuous <Continuous>    MEDICATIONS  (PRN):  fentaNYL    Injectable 12.5 MICROGram(s) IV Push every 2 hours PRN Moderate Pain (4 - 6)  fentaNYL    Injectable 25 MICROGram(s) IV Push every 3 hours PRN Severe Pain (7 - 10)  lidocaine 2% Gel 1 Application(s) Topical daily PRN pre HD  sodium chloride 0.9% lock flush 10 milliLiter(s) IV Push every 1 hour PRN Pre/post blood products, medications, blood draw, and to maintain line patency            REVIEW OF SYSTEMS:  Constitutional: [ ] fever, [ ]weight loss,  [x ]fatigue [ ]weight gain  Eyes: [ ] visual changes  Respiratory: [x ]shortness of breath;  [x ] cough, [ ]wheezing, [ ]chills, [ ]hemoptysis  Cardiovascular: [ ] chest pain, [ ]palpitations, [ ]dizziness,  [ ]leg swelling[ ]orthopnea[ ]PND  Gastrointestinal: [ ] abdominal pain, [ ]nausea, [ ]vomiting,  [ ]diarrhea [ ]Constipation [ ]Melena  Genitourinary: [ ] dysuria, [ ] hematuria [ ]Vigil  Neurologic: [ ] headaches [ ] tremors[ ]weakness [ ]Paralysis Right[ ] Left[ ]  Skin: [ ] itching, [ ]burning, [ ] rashes  Endocrine: [ ] heat or cold intolerance  Musculoskeletal: [ ] joint pain or swelling; [ ] muscle, back, or extremity pain  Psychiatric: [ ] depression, [ ]anxiety, [ ]mood swings, or [ ]difficulty sleeping  Hematologic: [ ] easy bruising, [ ] bleeding gums    [ ] All remaining systems negative except as per above.   [ ]Unable to obtain.  [x] No change in ROS since admission      Vital Signs Last 24 Hrs  T(C): 36.8 (10 Aquilino 2025 00:00), Max: 37.3 (2025 08:00)  T(F): 98.2 (10 Aquilino 2025 00:00), Max: 99.1 (2025 08:00)  HR: 90 (10 Aquilino 2025 05:00) (87 - 113)  BP: 110/57 (2025 20:30) (83/54 - 113/56)  BP(mean): 77 (2025 20:30) (65 - 81)  RR: 20 (10 Aquilino 2025 05:00) (9 - 28)  SpO2: 100% (10 Aquilino 2025 05:00) (76% - 100%)    Parameters below as of 10 Aquilino 2025 04:00  Patient On (Oxygen Delivery Method): nasal cannula      I&O's Summary    2025 07:01  -  2025 07:00  --------------------------------------------------------  IN: 2083.4 mL / OUT: 3027 mL / NET: -943.6 mL    2025 07:01  -  10 Aquilino 2025 06:25  --------------------------------------------------------  IN: 2831.6 mL / OUT: 2700 mL / NET: 131.6 mL        PHYSICAL EXAM:  General: No acute distress BMI-32  HEENT: EOMI, PERRL  Neck: Supple, [ ] JVD  Lungs: Equal air entry bilaterally; [ ] rales [ ] wheezing [ ] rhonchi  Heart: Regular rate and rhythm; [x ] murmur   2/6 [ x] systolic [ ] diastolic [ ] radiation[ ] rubs [ ]  gallops  Abdomen: Nontender, bowel sounds present  Extremities: No clubbing, cyanosis, [ ] edema [ ]Pulses  equal and intact  Nervous system:  Alert & Oriented X3, no focal deficits  Psychiatric: Normal affect  Skin: No rashes or lesions    LABS:  01-10    135  |  103  |  42[H]  ----------------------------<  217[H]  4.3   |  18[L]  |  2.98[H]    Ca    8.3[L]      10 Aquilino 2025 00:59  Phos  3.3     01-10  Mg     2.5     01-10    TPro  6.0  /  Alb  2.9[L]  /  TBili  1.1  /  DBili  x   /  AST  63[H]  /  ALT  128[H]  /  AlkPhos  131[H]  01-10    Creatinine Trend: 2.98<--, 1.99<--, 2.00<--, 2.17<--, 2.45<--, 3.23<--                        8.5    33.53 )-----------( 131      ( 10 Aquilino 2025 00:59 )             27.7     PT/INR - ( 2025 20:56 )   PT: 16.3 sec;   INR: 1.43 ratio         PTT - ( 10 Aquilino 2025 03:13 )  PTT:34.5 sec        Culture - Blood (25 @ 14:08)   Specimen Source: .Blood BLOOD  Culture Results: No growth at 24 hours     12 Lead ECG (25 @ 00:21) >   ATRIAL FIBRILLATION  LEFT AXIS DEVIATION  NON-SPECIFIC INTRA-VENTRICULAR CONDUCTIONBLOCK  MINIMAL VOLTAGE CRITERIA FOR LVH, MAY BE NORMAL VARIANT ( [place] product )  INFERIOR INFARCT , AGE UNDETERMINED  ABNORMAL ECG    TTE Limited W or WO Ultrasound Enhancing Agent (25 @ 08:28) >     CONCLUSIONS:      1. Left ventricular systolic function is severely decreased with an ejection fraction of 35 % by Michaels's method of disks. Global left ventricular hypokinesis.   2. Enlarged right ventricular cavity size and reduced right ventricular systolic function.   3. Trace pericardial effusion with no echocardiographic evidence of tamponade physiology.        TTE W or WO Ultrasound Enhancing Agent (25 @ 09:59) >  CONCLUSIONS:      1. Left ventricular systolic function is moderately decreased with an ejection fraction visually estimated at 40 %.   2. There is hypokinesis of the inferior and inferolateral walls.   3. Enlarged right ventricular cavity size and reduced right ventricular systolic function.   4.Pericardium:-There ira trace pericardial effusion noted adjacent to the posterior left ventricle.

## 2025-01-10 NOTE — PROGRESS NOTE ADULT - ASSESSMENT
54M with PMH of HTN, HLD, ESRD on HD MWF via LUE AVF, ascites (requiring repeat paracenteses q4-6wks), NICM with moderate LV dysfunction w/ EF 35-40%() and CAD s/p atherectomy + SALLIE to D1(2024) presents to [facility] transferred from [facility]. Patient initially presented to [facility] ED with shortness of breath. Of note he was recently admitted from - for acute cholecystitis s/p cholecystectomy. In [facility] ED, pt was hypoxic to 86% on RA, trop 1.7K, EKG no new changes, CXR fluid overload. Admitted for AHRF 2/2 fluid overload. Pt received HD on , ,  and . DAPT was resumed, TTE showed improvement in LVEF to 40-45%. Stress test was abnormal with 1mm inferior STD, reversible ischemia in the inferior wall and fixed defects in the lateral, anterior and apical walls with post stress EF of 24%. Pt was then transferred to [facility] for cardiac cath which showed pLAD 70% ISR, mLCx 70%, D1 severe dz. Patient now transferred to [facility] CTS Dr. Rivers for CABG eval. Patient denies any current SOB or chest pain on admission. Otherwise denies headaches, abdominal pain, urinary or bowel changes, fevers, chills, N/V/D, sick contacts.  (24 Dec 2024 05:07)   VSS  CP free   HD via avf  for daily HD pre op- on lovenox 30 qd    HD today continue with present meds. Plan for CABG possibleTVR next week   vss; rsr 55-80; hd today w/ 1 unit prbc; pt to be dialzyed also this  w/ another 1 unit prbc   VSS; RSR 60-80; continue asa/bb/ statin; HD in am - transfuse 1 unit prbc with HD; d/c lovenox after am dose sun    - Pt underwent  C3L free LIMA-LAD; SVG-Diag; SVG-leftPL  Pt given cefuroxime perioperatively   pt extubated   . pt with hypotension and ECHO showing Systolic HF/depressed BIV Function, currently supported with /pressors.  Labs this evening showing transaminitis  1/2 -3 pt hypotensive, febrile and reintubated  Pt pancultured. and started on zosyn- meropenem and gent  pt found to have Gram negative bacteremia    E coli +ESBL bacteremia        A/P  #sepsis  ? source - many options. Pt with abnormal u/a but is a dialysis pt so hard to interpret. Pt with h//o SBP- pt with ascites   s/p  paracentesis 1/3  cultures No Growth to date  - no organisms notes on gram stain  could be from lungs   Continue meropenem  lines  were  changed, to d/c shiley today   Pt with worsening WBC. ? from plug  Ct with left sided consolidation- check cultures. also with large ascites. team to do PARACENTESIS   check c diff if develops diarrhea  check bladder scan- to make sure no urinary source- not distended on CT   COULD GRAFT BE INFECTED ? CHECK U/S OF GRAFT   IF NO ANSWER AFTER ABOVE THEN CONSDIER wbc TAGGED STUDY        #elevated LFTs  likely shock liver  acute  hepatitis serology- negative   trend  avoid hepatotoxic meds    improving daily         #thrombocytopenia   HIt ab negative  Likely from sepsis vs from IABP or drugs   trend   iMPROVING     #Renal failure  going back to Hemodialysis   changeD the meropenem to daily dosing - FOR RENAL FUNCTION        Marla Champion M.D. ,   please reach via teams   If no answer, or after 5PM/ weekends,  then please call  [phone]    Assessment and plan discussed with the primary team .    ID service will be covering over the weekend. Please call for acute issues or questions. [phone]

## 2025-01-10 NOTE — PROCEDURE NOTE - NSBRONCHFINDINGS_GEN_A_CORE_FT
Bronchoscope inserted through mouth. Oropharynx with heavy thin clear secretions. Airway evaluation revealed Sharp Abena. AUGUSTO and LLL evaluation revealed white thick secretions with multiple mucus plugs. RUL, RML, RLL revealed mild thin white secretions. Bronchoscope then withdrawn from mouth. No bleeding noted. Post Bronch xray ordered.     Patient tolerated procedure with no immediate complications.

## 2025-01-10 NOTE — PROGRESS NOTE ADULT - SUBJECTIVE AND OBJECTIVE BOX
Patient is a 54y old  Male who presents with a chief complaint of CAD s/p CABG (10 Aquil[place] 2025 06:24)    Being followed by ID for        Interval history:  No other acute events      ROS:  No cough,SOB,CP  No N/V/D  No abd pain  No urinary complaints  No HA  No joint or limb pain  No other complaints    PAST MEDICAL & SURGICAL HISTORY:  Type 2 diabetes mellitus      Hypertension      End stage renal disease  started HD 2/2019 T, Th, Sat via right chest permacath      Bone spur  right shoulder- hx of - sx done      Anemia      Injury of right wrist  hx of at age 14      History of hyperkalemia  before HD- K-6.2 - to repeat in am of sx, pt had HD today      S/P arthroscopy of right shoulder  2010      History of vascular access device  right chest permacath - 2/2019      H/O right wrist surgery  tendons repair - at age 14      AV fistula      H/O ventral hernia repair      S/P cholecystectomy        Allergies    No Known Allergies    Intolerances      Antimicrobials:    meropenem  IVPB 500 milliGRAM(s) IV Intermittent every 24 hours    MEDICATIONS  (STANDING):  albuterol/ipratropium for Nebulization 3 milliLiter(s) Nebulizer every 6 hours  aMIOdarone    Tablet   Oral   aMIOdarone    Tablet 400 milliGRAM(s) Oral every 8 hours  aspirin  chewable 81 milliGRAM(s) Oral daily  bisacodyl Suppository 10 milliGRAM(s) Rectal once  buDESOnide    Inhalation Suspension 0.25 milliGRAM(s) Inhalation every 12 hours  chlorhexidine 2% Cloths 1 Application(s) Topical daily  chlorhexidine 4% Liquid 1 Application(s) Topical <User Schedule>  dexMEDEtomidine Infusion 0.5 MICROgram(s)/kG/Hr (10.6 mL/Hr) IV Continuous <Continuous>  dextrose 50% Injectable 50 milliLiter(s) IV Push every 15 minutes  dextrose 50% Injectable 25 milliLiter(s) IV Push every 15 minutes  DOBUTamine Infusion 5 MICROgram(s)/kG/Min (6.38 mL/Hr) IV Continuous <Continuous>  epoetin ignacia (EPOGEN) Injectable 32569 Unit(s) IV Push <User Schedule>  gabapentin 100 milliGRAM(s) Oral every 12 hours  heparin  Infusion 300 Unit(s)/Hr (11 mL/Hr) IV Continuous <Continuous>  insulin lispro (ADMELOG) corrective regimen sliding scale   SubCutaneous every 6 hours  ketamine Injectable 60 milliGRAM(s) IV Push once  lidocaine   4% Patch 2 Patch Transdermal daily  lidocaine 2% Injection for Nebulization 8 milliLiter(s) Nebulizer once  meropenem  IVPB 500 milliGRAM(s) IV Intermittent every 24 hours  midazolam Injectable 2 milliGRAM(s) IV Push once  Nephro-elicia 1 Tablet(s) Oral daily  pantoprazole  Injectable 40 milliGRAM(s) IV Push daily  polyethylene glycol 3350 17 Gram(s) Oral two times a day  senna 2 Tablet(s) Oral at bedtime  sodium chloride 0.9%. 1000 milliLiter(s) (10 mL/Hr) IV Continuous <Continuous>  sodium chloride 3%  Inhalation 4 milliLiter(s) Inhalation every 6 hours  vasopressin Infusion 0.03 Unit(s)/Min (4.5 mL/Hr) IV Continuous <Continuous>      Vital Signs Last 24 Hrs  T(C): 36.3 (01-10-25 @ 08:00), Max: 37.2 (01-09-25 @ 12:00)  T(F): 97.3 (01-10-25 @ 08:00), Max: 99 (01-09-25 @ 12:00)  HR: 105 (01-10-25 @ 11:30) (85 - 113)  BP: 110/57 (01-09-25 @ 20:30) (83/54 - 113/56)  BP(mean): 77 (01-09-25 @ 20:30) (65 - 81)  RR: 26 (01-10-25 @ 11:30) (9 - 28)  SpO2: 97% (01-10-25 @ 11:30) (76% - 100%)    Physical Exam:    Constitutional well preserved,comfortable,pleasant    HEENT PERRLA EOMI,No pallor or icterus    No oral exudate or erythema    Neck supple no JVD or LN    Chest Good AE,CTA    CVS RRR S1 S2 WNl No murmur or rub or gallop    Abd soft BS normal No tenderness no masses    Ext No cyanosis clubbing or edema    IV site no erythema tenderness or discharge    Joints no swelling or LOM    CNS AAO X 3 no focal    Lab Data:                          8.5    33.53 )-----------( 131      ( 10 Aquilino 2025 00:59 )             27.7       01-10    135  |  103  |  42[H]  ----------------------------<  217[H]  4.3   |  18[L]  |  2.98[H]    Ca    8.3[L]      10 Aquilino 2025 00:59  Phos  3.3     01-10  Mg     2.5     01-10    TPro  6.0  /  Alb  2.9[L]  /  TBili  1.1  /  DBili  x   /  AST  63[H]  /  ALT  128[H]  /  AlkPhos  131[H]  01-10      Urinalysis Basic - ( 10 Aquilino 2025 00:59 )    Color: x / Appearance: x / SG: x / pH: x  Gluc: 217 mg/dL / Ketone: x  / Bili: x / Urobili: x   Blood: x / Protein: x / Nitrite: x   Leuk Esterase: x / RBC: x / WBC x   Sq Epi: x / Non Sq Epi: x / Bacteria: x        .Blood BLOOD  01-07-25   No growth at 48 Hours  --  --      .Blood BLOOD  01-05-25   No growth at 4 days  --  --      .Blood BLOOD  01-05-25   No growth at 4 days  --  --      Peritoneal  01-03-25   No growth at 5 days  --    polymorphonuclear leukocytes seen  No organisms seen  by cytocentrifuge      Bronchial  01-03-25   Culture is being performed.  --  --                    WBC Count: 33.53 (01-10-25 @ 00:59)  WBC Count: 27.85 (01-09-25 @ 00:31)  WBC Count: 25.63 (01-08-25 @ 00:28)  WBC Count: 21.04 (01-07-25 @ 00:26)  WBC Count: 23.28 (01-06-25 @ 18:25)  WBC Count: 21.81 (01-06-25 @ 13:30)  WBC Count: 23.31 (01-06-25 @ 00:35)  WBC Count: 24.30 (01-05-25 @ 00:48)  WBC Count: 19.46 (01-04-25 @ 14:33)  WBC Count: 13.29 (01-04-25 @ 00:56)  WBC Count: 9.39 (01-03-25 @ 16:17)       Bilirubin Total: 1.1 mg/dL (01-10-25 @ 00:59)  Aspartate Aminotransferase (AST/SGOT): 63 U/L (01-10-25 @ 00:59)  Alanine Aminotransferase (ALT/SGPT): 128 U/L (01-10-25 @ 00:59)  Alkaline Phosphatase: 131 U/L (01-10-25 @ 00:59)  Bilirubin Total: 1.5 mg/dL (01-09-25 @ 00:31)  Aspartate Aminotransferase (AST/SGOT): 68 U/L (01-09-25 @ 00:31)  Alanine Aminotransferase (ALT/SGPT): 171 U/L (01-09-25 @ 00:31)  Alkaline Phosphatase: 114 U/L (01-09-25 @ 00:31)  Bilirubin Total: 1.9 mg/dL (01-08-25 @ 00:27)  Aspartate Aminotransferase (AST/SGOT): 97 U/L (01-08-25 @ 00:27)  Alanine Aminotransferase (ALT/SGPT): 240 U/L (01-08-25 @ 00:27)  Alkaline Phosphatase: 106 U/L (01-08-25 @ 00:27)  Bilirubin Total: 2.3 mg/dL (01-07-25 @ 00:26)  Aspartate Aminotransferase (AST/SGOT): 180 U/L (01-07-25 @ 00:26)  Alanine Aminotransferase (ALT/SGPT): 377 U/L (01-07-25 @ 00:26)  Alkaline Phosphatase: 102 U/L (01-07-25 @ 00:26)  Bilirubin Total: 2.3 mg/dL (01-06-25 @ 00:35)  Aspartate Aminotransferase (AST/SGOT): 301 U/L (01-06-25 @ 00:35)  Alanine Aminotransferase (ALT/SGPT): 565 U/L (01-06-25 @ 00:35)  Alkaline Phosphatase: 122 U/L (01-06-25 @ 00:35)         Patient is a 54y old  Male who presents with a chief complaint of CAD s/p CABG (10 [place] 2025 06:24)    Being followed by ID for        Interval history:  pt obtaining bronchoscopy  mucus plug identified  WBC further elevated   No other acute events      PAST MEDICAL & SURGICAL HISTORY:  Type 2 diabetes mellitus      Hypertension      End stage renal disease  started HD 2/2019 T, Th, Sat via right chest permacath      Bone spur  right shoulder- hx of - sx done      Anemia      Injury of right wrist  hx of at age 14      History of hyperkalemia  before HD- K-6.2 - to repeat in am of sx, pt had HD today      S/P arthroscopy of right shoulder  2010      History of vascular access device  right chest permacath - 2/2019      H/O right wrist surgery  tendons repair - at age 14      AV fistula      H/O ventral hernia repair      S/P cholecystectomy        Allergies    No Known Allergies    Intolerances      Antimicrobials:    meropenem  IVPB 500 milliGRAM(s) IV Intermittent every 24 hours    MEDICATIONS  (STANDING):  albuterol/ipratropium for Nebulization 3 milliLiter(s) Nebulizer every 6 hours  aMIOdarone    Tablet   Oral   aMIOdarone    Tablet 400 milliGRAM(s) Oral every 8 hours  aspirin  chewable 81 milliGRAM(s) Oral daily  bisacodyl Suppository 10 milliGRAM(s) Rectal once  buDESOnide    Inhalation Suspension 0.25 milliGRAM(s) Inhalation every 12 hours  chlorhexidine 2% Cloths 1 Application(s) Topical daily  chlorhexidine 4% Liquid 1 Application(s) Topical <User Schedule>  dexMEDEtomidine Infusion 0.5 MICROgram(s)/kG/Hr (10.6 mL/Hr) IV Continuous <Continuous>  dextrose 50% Injectable 50 milliLiter(s) IV Push every 15 minutes  dextrose 50% Injectable 25 milliLiter(s) IV Push every 15 minutes  DOBUTamine Infusion 5 MICROgram(s)/kG/Min (6.38 mL/Hr) IV Continuous <Continuous>  epoetin ignacia (EPOGEN) Injectable 40485 Unit(s) IV Push <User Schedule>  gabapentin 100 milliGRAM(s) Oral every 12 hours  heparin  Infusion 300 Unit(s)/Hr (11 mL/Hr) IV Continuous <Continuous>  insulin lispro (ADMELOG) corrective regimen sliding scale   SubCutaneous every 6 hours  ketamine Injectable 60 milliGRAM(s) IV Push once  lidocaine   4% Patch 2 Patch Transdermal daily  lidocaine 2% Injection for Nebulization 8 milliLiter(s) Nebulizer once  meropenem  IVPB 500 milliGRAM(s) IV Intermittent every 24 hours  midazolam Injectable 2 milliGRAM(s) IV Push once  Nephro-elicia 1 Tablet(s) Oral daily  pantoprazole  Injectable 40 milliGRAM(s) IV Push daily  polyethylene glycol 3350 17 Gram(s) Oral two times a day  senna 2 Tablet(s) Oral at bedtime  sodium chloride 0.9%. 1000 milliLiter(s) (10 mL/Hr) IV Continuous <Continuous>  sodium chloride 3%  Inhalation 4 milliLiter(s) Inhalation every 6 hours  vasopressin Infusion 0.03 Unit(s)/Min (4.5 mL/Hr) IV Continuous <Continuous>      Vital Signs Last 24 Hrs  T(C): 36.3 (01-10-25 @ 08:00), Max: 37.2 (01-09-25 @ 12:00)  T(F): 97.3 (01-10-25 @ 08:00), Max: 99 (01-09-25 @ 12:00)  HR: 105 (01-10-25 @ 11:30) (85 - 113)  BP: 110/57 (01-09-25 @ 20:30) (83/54 - 113/56)  BP(mean): 77 (01-09-25 @ 20:30) (65 - 81)  RR: 26 (01-10-25 @ 11:30) (9 - 28)  SpO2: 97% (01-10-25 @ 11:30) (76% - 100%)    Physical Exam:    Constitutional well preserved,comfortable,pleasant    HEENT PERRLA EOMI,No pallor or icterus    No oral exudate or erythema    Neck supple no JVD or LN    Chest Good AE,CTA    CVS  S1 S2     Abd soft BS normal No tenderness     Ext No cyanosis clubbing or edema    IV site no erythema tenderness or discharge    LUE AV fistula        Lab Data:                          8.5    33.53 )-----------( 131      ( 10 Aquil[place] 2025 00:59 )             27.7       01-10    135  |  103  |  42[H]  ----------------------------<  217[H]  4.3   |  18[L]  |  2.98[H]    Ca    8.3[L]      10 Aquilino 2025 00:59  Phos  3.3     01-10  Mg     2.5     01-10    TPro  6.0  /  Alb  2.9[L]  /  TBili  1.1  /  DBili  x   /  AST  63[H]  /  ALT  128[H]  /  AlkPhos  131[H]  01-10          .Blood BLOOD  01-07-25   No growth at 48 Hours  --  --      .Blood BLOOD  01-05-25   No growth at 4 days  --  --      .Blood BLOOD  01-05-25   No growth at 4 days  --  --      Peritoneal  01-03-25   No growth at 5 days  --    polymorphonuclear leukocytes seen  No organisms seen  by cytocentrifuge      Bronchial  01-03-25   Culture is being performed.  --  --        WBC Count: 33.53 (01-10-25 @ 00:59)  WBC Count: 27.85 (01-09-25 @ 00:31)  WBC Count: 25.63 (01-08-25 @ 00:28)  WBC Count: 21.04 (01-07-25 @ 00:26)  WBC Count: 23.28 (01-06-25 @ 18:25)  WBC Count: 21.81 (01-06-25 @ 13:30)  WBC Count: 23.31 (01-06-25 @ 00:35)  WBC Count: 24.30 (01-05-25 @ 00:48)  WBC Count: 19.46 (01-04-25 @ 14:33)  WBC Count: 13.29 (01-04-25 @ 00:56)  WBC Count: 9.39 (01-03-25 @ 16:17)       Bilirubin Total: 1.1 mg/dL (01-10-25 @ 00:59)  Aspartate Aminotransferase (AST/SGOT): 63 U/L (01-10-25 @ 00:59)  Alanine Aminotransferase (ALT/SGPT): 128 U/L (01-10-25 @ 00:59)  Alkaline Phosphatase: 131 U/L (01-10-25 @ 00:59)    Bilirubin Total: 1.5 mg/dL (01-09-25 @ 00:31)  Aspartate Aminotransferase (AST/SGOT): 68 U/L (01-09-25 @ 00:31)  Alanine Aminotransferase (ALT/SGPT): 171 U/L (01-09-25 @ 00:31)  Alkaline Phosphatase: 114 U/L (01-09-25 @ 00:31)    Bilirubin Total: 1.9 mg/dL (01-08-25 @ 00:27)  Aspartate Aminotransferase (AST/SGOT): 97 U/L (01-08-25 @ 00:27)  Alanine Aminotransferase (ALT/SGPT): 240 U/L (01-08-25 @ 00:27)  Alkaline Phosphatase: 106 U/L (01-08-25 @ 00:27)      < from: CT Abdomen and Pelvis w/ IV Cont (01.10.25 @ 15:41) >    ACC: [id number] EXAM:  CT ABDOMEN AND PELVIS IC   ORDERED BY:  ANTONIO BOWERS     ACC: [id number] EXAM:  CT CHEST IC   ORDERED BY:  ANTONIO BOWERS     PROCEDURE DATE:  01/10/2025          INTERPRETATION:  CLINICAL INFORMATION: r/o infection. hx esbl Admitting   Dxs: I25.10 ATHSCL HEART DISEASE OF NATIVE CORONARY ARTERY W/O ANG PCTRS   MCT    COMPARISON: 11.27.24  11.4.23.    CONTRAST/COMPLICATIONS:  IV Contrast: Omnipaque 350 (accession [id number]), IV contrast documented   in unlinked concurrent exam (accession [id number])  90 cc administered   10   cc discarded  Oral Contrast: NONE  .    PROCEDURE:  CT of the Chest, Abdomen and Pelvis was performed.  Sagittal and coronal reformats were performed.    FINDINGS:  CHEST:  LUNGS AND LARGE AIRWAYS: Patent central airways. Right lower lobe   subsegmental atelectasis. Scattered consolidation in the left lung most   severe in the lower lobe.  PLEURA: Small pleural effusions.  VESSELS: A right internal jugular vein thrombus. Left IJ line.  HEART: Cardiomegaly. No pericardial effusion. Status post CABG.  MEDIASTINUM AND CASSANDRA: No lymphadenopathy.  CHEST WALL AND LOWER NECK: Sternotomy.    ABDOMEN AND PELVIS:  LIVER: Within normal limits.  BILE DUCTS: Normal caliber.  GALLBLADDER: Status post cholecystectomy.  SPLEEN: Within normal limits.  PANCREAS: Within normal limits.  ADRENALS: Within normal limits.  KIDNEYS/URETERS: Atrophic kidneys.    BLADDER: Within normal limits.  REPRODUCTIVE ORGANS: Within normal limits.    BOWEL: No bowel obstruction. Nasogastric tube terminates in the stomach.   Normal appendix.  PERITONEUM/RETROPERITONEUM: Large volume ascites.  VESSELS:  Within normal limits.  ABDOMINAL WALL: Ventral hernia mesh.  BONES: Within normal limits.    IMPRESSION: Scattered consolidation in theleft lung most severe in the   lower lobe.    Nonocclusive thrombus right internal jugular vein.    Large volume ascites.    --- End of Report ---            HOLA MIN MD; Attending Radiologist  This document has been electronically signed. Aquilino 10 2025  4:13PM    < end of copied text >

## 2025-01-10 NOTE — PROGRESS NOTE ADULT - SUBJECTIVE AND OBJECTIVE BOX
Patient seen and examined at the bedside.    Remains critically ill on continuous ICU monitoring.      Brief Summary:  53 yo M with multiple medical problems including CAD s/p PCI in April 2024 s/p CABG x 3 on 12/30/24 1/2: Intubated for hypoxia & left lung white out s/p awake bronch with removal of copious mucopurulent secretions  1/4: s/p IABP insertion, sepsis/septic shock due to E coli     24 Hour events:  Dobutamine restarted in the setting of increasing pressors and lactic acidosis   BiPAP overnight for recruitment   Transitioned to PO Amio  iHD yesterday net neg 2L    Objective:  Vital Signs Last 24 Hrs  T(C): 36.8 (10 Aquilino 2025 00:00), Max: 37.3 (09 Jan 2025 08:00)  T(F): 98.2 (10 Aquilino 2025 00:00), Max: 99.1 (09 Jan 2025 08:00)  HR: 90 (10 Aquilino 2025 05:00) (87 - 113)  BP: 110/57 (09 Jan 2025 20:30) (83/54 - 113/56)  BP(mean): 77 (09 Jan 2025 20:30) (65 - 81)  RR: 20 (10 Aquilino 2025 05:00) (9 - 28)  SpO2: 100% (10 Aquilino 2025 05:00) (76% - 100%)    Parameters below as of 10 Aquilino 2025 04:00  Patient On (Oxygen Delivery Method): nasal cannula    Physical Exam:  General: awake, alert and responsive   Neurology: moving all extremities to command, non-focal   Respiratory: diminished left base   CV: Sinus  Abdominal: Soft, Nontender  Extremities: Warm, well-perfused  -------------------------------------------------------------------------------------------------------------------------------    Labs:                        8.5    33.53 )-----------( 131      ( 10 Aquilino 2025 00:59 )             27.7     01-10    135  |  103  |  42[H]  ----------------------------<  217[H]  4.3   |  18[L]  |  2.98[H]    Ca    8.3[L]      10 Aquilino 2025 00:59  Phos  3.3     01-10  Mg     2.5     01-10    TPro  6.0  /  Alb  2.9[L]  /  TBili  1.1  /  DBili  x   /  AST  63[H]  /  ALT  128[H]  /  AlkPhos  131[H]  01-10    LIVER FUNCTIONS - ( 10 Aquilino 2025 00:59 )  Alb: 2.9 g/dL / Pro: 6.0 g/dL / ALK PHOS: 131 U/L / ALT: 128 U/L / AST: 63 U/L / GGT: x           PT/INR - ( 08 Jan 2025 20:56 )   PT: 16.3 sec;   INR: 1.43 ratio       PTT - ( 10 Aquilino 2025 03:13 )  PTT:34.5 sec  ABG - ( 10 Aquilino 2025 00:27 )  pH, Arterial: 7.39  pH, Blood: x     /  pCO2: 35    /  pO2: 72    / HCO3: 21    / Base Excess: -3.3  /  SaO2: 96.9    ------------------------------------------------------------------------------------------------------------------------------  Assessment:  53 yo M s/p CABG x 3 on 12/30/24    Postop acute pulmonary insufficiency  Cardiogenic and Septic shock  Acute blood loss anemia  Transaminitis  Ascites   Thrombocytopenia   ESRD on iHD requiring CRRT   E coli bacteremia 2/2 pneumonia  Leukocytosis     Plan:    ***Neuro***  Postoperative acute pain control with Gabapentin and prn Fentanyl.     ***Cardiovascular***  s/p CABG x 3  d/c R. fem IABP 1/6  Off pressors, MAP > 65 mm Hg.   Remains on Dobutamine - wean as tolerated   PO Amiodarone for Afib prophylaxis   Consider cardioversion of AF   Alex weaned off 1/7  Holding ASA / Plavix in setting of thrombocytopenia   Vascular surgery consulted for fistula evaluation  1/5 TTE     1. Left ventricular systolic function is moderately decreased with an ejection fraction visually estimated at 40 %.   2. There is hypokinesis of the inferior and inferolateral walls.   3. Enlarged right ventricular cavity size and reduced right ventricular systolic function.  Repeat TTE yesterday 1/9    ***Pulmonary***  Postoperative acute pulmonary insufficiency - extubated 1/8 to HFNC  Intermittent BiPAP for recruitment     ***GI***  Tube feeds   Protonix for stress ulcer prophylaxis.   Bowel regimen.  Ascites: Paracentesis 1/3 - negative for SBP, will consider additional paracentesis with persistent fevers or reaccumulation with concern for intraabdominal HTN     ***Renal***  hx of ESRD iHD - first session of HD yesterday 1/9  LUE AV fistula     ***ID***  Sepsis/septic shock due to ESBL E. coli - Continue Meropenem, ongoing fevers   Repeat BCx today   1/7 Bcx negative to date  1/5 Bcx negative to date  1/3 Bronch ESBL E Coli   1/2 Bcx ESBL E coli   Trend leukocytosis.  Surgical chest wall wound well appearing - no active infection     ***Endocrine***  Hyperglycemia- Insulin sliding scale    ***Hematology***  Acute blood loss anemia and thrombocytopenia.  No current transfusion indication.  PF4 negative 1/4  Heparin gtt for persistent AF     *** Lines ***  [facility] TLC - 1/7  R fem HD cath - 1/3 - d/c'ed 1/9   R rad afia - 12/30        Care plan discussed with the ICU care team.   Patient remains critical, at risk for life threatening decompensation.    I have spent 30 minutes providing critical care management to this patient.    By signing my name below, I, Baldemar Hernandez, attest that this documentation has been prepared under the direction and in the presence of Blaze Finch MD.  Electronically signed: Evelio Sevilla 01-10-25 @ 06:37    Blaze LEAHY MD,  personally performed the services described in this documentation. All medical record entries made by the scribe were at my direction and in my presence. I have reviewed the chart and agree that the record reflects my personal performance and is accurate and complete  Electronically signed: Blaze Finch MD. Patient seen and examined at the bedside.    Remains critically ill on continuous ICU monitoring.      Brief Summary:  55 yo M with multiple medical problems including CAD s/p PCI in April 2024 s/p CABG x 3 on 12/30/24 1/2: Intubated for hypoxia & left lung white out s/p awake bronch with removal of copious mucopurulent secretions  1/4: s/p IABP insertion, sepsis/septic shock due to E coli     24 Hour events:  Dobutamine restarted in the setting of increasing pressors and lactic acidosis   BiPAP for recruitment   Transitioned to PO Amio  iHD yesterday net neg 2L (required vaso 0.01)    Objective:  Vital Signs Last 24 Hrs  T(C): 36.8 (10 Aquilino 2025 00:00), Max: 37.3 (09 Jan 2025 08:00)  T(F): 98.2 (10 Aquilino 2025 00:00), Max: 99.1 (09 Jan 2025 08:00)  HR: 90 (10 Aquilino 2025 05:00) (87 - 113)  BP: 110/57 (09 Jan 2025 20:30) (83/54 - 113/56)  BP(mean): 77 (09 Jan 2025 20:30) (65 - 81)  RR: 20 (10 Aquilino 2025 05:00) (9 - 28)  SpO2: 100% (10 Aquilino 2025 05:00) (76% - 100%)    Parameters below as of 10 Aquilino 2025 04:00  Patient On (Oxygen Delivery Method): nasal cannula    Physical Exam:  General: awake, alert and responsive   Neurology: moving all extremities to command, non-focal   Respiratory: diminished left base   CV: Sinus  Abdominal: Soft, Nontender  Extremities: Warm, well-perfused  -------------------------------------------------------------------------------------------------------------------------------    Labs:                        8.5    33.53 )-----------( 131      ( 10 Aquilino 2025 00:59 )             27.7     01-10    135  |  103  |  42[H]  ----------------------------<  217[H]  4.3   |  18[L]  |  2.98[H]    Ca    8.3[L]      10 Aquilino 2025 00:59  Phos  3.3     01-10  Mg     2.5     01-10    TPro  6.0  /  Alb  2.9[L]  /  TBili  1.1  /  DBili  x   /  AST  63[H]  /  ALT  128[H]  /  AlkPhos  131[H]  01-10    LIVER FUNCTIONS - ( 10 [place] 2025 00:59 )  Alb: 2.9 g/dL / Pro: 6.0 g/dL / ALK PHOS: 131 U/L / ALT: 128 U/L / AST: 63 U/L / GGT: x           PT/INR - ( 08 Jan 2025 20:56 )   PT: 16.3 sec;   INR: 1.43 ratio       PTT - ( 10 Aquilino 2025 03:13 )  PTT:34.5 sec  ABG - ( 10 Aquilino 2025 00:27 )  pH, Arterial: 7.39  pH, Blood: x     /  pCO2: 35    /  pO2: 72    / HCO3: 21    / Base Excess: -3.3  /  SaO2: 96.9    ------------------------------------------------------------------------------------------------------------------------------  Assessment:  55 yo M s/p CABG x 3 on 12/30/24    Postop acute pulmonary insufficiency  Cardiogenic and Septic shock  Acute blood loss anemia  Transaminitis  Ascites   Thrombocytopenia   ESRD on iHD requiring CRRT   E coli bacteremia 2/2 pneumonia  Leukocytosis     Plan:    ***Neuro***  Postoperative acute pain control with Gabapentin and prn Fentanyl.     ***Cardiovascular***  s/p CABG x 3  d/c R. fem IABP 1/6  Alex weaned off 1/7  MAP > 65 mm Hg.   Remains on Dobutamine - wean as tolerated.   PO Amiodarone for Afib - patient declined cardioversion   ASA / Plavix in setting of thrombocytopenia   1/5 TTE  LVEF 40%, reduced and enlarged RV  1/9 TTE  LVEF 35%, reduced and enlarged RV    ***Pulmonary***  Postoperative acute pulmonary insufficiency - extubated 1/8 to HFNC/BiPAP  BiPAP for recruitment today   Left lung collapse  Bronchoscopy today     ***GI***  Tube feeds   Protonix for stress ulcer prophylaxis.   Bowel regimen.  Ascites: Paracentesis 1/3 - negative for SBP,   Likely to need repeat paracentesis today/tomorrow pending ascites amount    ***Renal***  hx of ESRD iHD - first session of HD yesterday 1/9 (-2L)  LUE AV fistula     ***ID***  Sepsis/septic shock due to ESBL E. coli - Continue Meropenem. persistent leukocytosis   1/9 Bcx pending   1/7 Bcx negative to date  1/5 Bcx negative to date  1/3 Bronch ESBL E Coli   1/2 Bcx ESBL E coli   Trend leukocytosis.  Surgical chest wall wound well appearing - no active infection     ***Endocrine***  Hyperglycemia- Insulin sliding scale    ***Hematology***  Acute blood loss anemia and thrombocytopenia.  No current transfusion indication.  PF4 negative 1/4  Heparin gtt for persistent AF     *** Lines ***  [facility] TLC - 1/7  R rad afia - 12/30  LUE AV fistula     Care plan discussed with the ICU care team.   Patient remains critical, at risk for life threatening decompensation.    I have spent 55 minutes providing critical care management to this patient.    By signing my name below, I, Baldemar Hernandez, attest that this documentation has been prepared under the direction and in the presence of Blaze Finch MD.  Electronically signed: Evelio Sevilla 01-10-25 @ 06:37    I, Blaze Finch MD,  personally performed the services described in this documentation. All medical record entries made by the scribe were at my direction and in my presence. I have reviewed the chart and agree that the record reflects my personal performance and is accurate and complete  Electronically signed: Blaze Finch MD.

## 2025-01-10 NOTE — PROGRESS NOTE ADULT - ASSESSMENT
55 y/o male with PMHx of hypertension, ESRD on HD, CAD (s/p PCI in 4/2024), chronic systolic heart failure, chronic ascites, recent admission for cholecystitis s/p cholecystectomy who was admitted for acute decompensated heart failure and found to have abnormal nuclear stress. LHC showed multi-vessel coronary disease, for which he underwent a CABG (LIMA - LAD | SVG - D1 | SVG - LPL) on 12/30/24. Post-op course complicated by shock requiring IABP + dobutamine, ESBL E. coli bacteremia, new-onset atrial fibrillation (1/6/25). Currently on amiodarone and IV heparin. EP Is consulted for rhythm control.     - New-onset, post-op atrial fibrillation (CHADSVASC = 3).    Telemetry from 1/6/25 till today shows atrial fibrillation with HR ~. Currently on dobutamine 5. Off pressors    Of note, has been sub-therapeutic on IV heparin.     RECOMMENDATIONS:  - continue therapeutic anticoagulation (recommend transitioning to PO apixaban 5 mg BID when able from surgical perspective)  - continue PO amiodarone 400 mg TID (until a 10 gram load)  - please have ICU pharmacist calculate total amiodarone received thus far  - would check TSH and monitor LFTs while on amiodarone  - outpatient monitoring of PFTs and eyes  - discussed risks/benefits of EDU/DCCV with the patient, but he declines at this time. Would prefer to continue medical management, and re-visit the discussion at a later time  - EP will follow            Neno Somers (PGY 4)  Cardiology Fellow      Of note, these recommendations are preliminary. Case to be discussed with attending. Please see attending attestation for final recommendations.  55 y/o male with PMHx of hypertension, ESRD on HD, CAD (s/p PCI in 4/2024), chronic systolic heart failure, chronic ascites, recent admission for cholecystitis s/p cholecystectomy who was admitted for acute decompensated heart failure and found to have abnormal nuclear stress. LHC showed multi-vessel coronary disease, for which he underwent a CABG (LIMA - LAD | SVG - D1 | SVG - LPL) on 12/30/24. Post-op course complicated by shock requiring IABP + dobutamine, ESBL E. coli bacteremia, new-onset atrial fibrillation (1/6/25). Currently on amiodarone and IV heparin. EP Is consulted for rhythm control.     - New-onset, post-op atrial fibrillation (CHADSVASC = 3) s/p EDU/DCCV (1/10/25)    Telemetry from 1/6/25 till today shows atrial fibrillation with HR ~. Currently on dobutamine 5. Vaso 0.02    Under moderate sedation, he received a bronchoscopy today as per ICU team. Subsequently, he received a EDU (no evidence of HERNAN clot) at bedside. Subsequently, he was prepped for DCCV. He received 200 joules of external shock. First attempt was unsuccessful. Defibrillator pads were re-attached with a different vector. Second external shock was attempted at 200 joules, which successfully converted rhythm to sinus.    RECOMMENDATIONS:  - s/p EDU/DCCV x 2 on 1/10/25 with conversion to sinus rhythm  - Currently on IV heparin, but has remained sub-therapeutic. Would ensure that patient achieves therapeutic PTT levels. Alternatively, recommend transitioning to PO apixaban 5 mg BID or IM lovenox when able to from surgical perspective  - continue PO amiodarone 400 mg TID (until a 10 gram load)  - please have ICU pharmacist calculate total amiodarone received thus far  - would check TSH and monitor LFTs while on amiodarone  - outpatient monitoring of PFTs and eyes  - EP will stop active follow up, please re-consult us if necessary            Neno Somers (PGY 4)  Cardiology Fellow      Of note, these recommendations are preliminary. Case to be discussed with attending. Please see attending attestation for final recommendations.

## 2025-01-10 NOTE — CONSULT NOTE ADULT - SUBJECTIVE AND OBJECTIVE BOX
Interventional Radiology    Evaluate for Procedure: Diagnostic Paracentesis and Left Chest tube     HPI: 55 y/o male with PMHx of hypertension, ESRD on HD, CAD (s/p PCI in 4/2024), chronic systolic heart failure, chronic ascites, recent admission for cholecystitis s/p cholecystectomy who was admitted for acute decompensated heart failure and found to have abnormal nuclear stress. LHC showed multi-vessel coronary disease, for which he underwent a CABG (LIMA - LAD | SVG - D1 | SVG - LPL) on 12/30/24. Post-op course complicated by shock requiring IABP + dobutamine, ESBL E. coli bacteremia, new-onset atrial fibrillation (1/6/25). Currently on amiodarone and IV heparin. Now s/p electrical cardioversion. IR consulted for diagnostic paracentesis and left chest tube placement.     Allergies: No Known Allergies    Medications (Abx/Cardiac/Anticoagulation/Blood Products)  alteplase for catheter clearance: 2 milliGRAM(s) Catheter (01-08 @ 18:01)  aMIOdarone    Tablet: 400 milliGRAM(s) Oral (01-10 @ 05:52)  aMIOdarone Infusion: 16.7 mL/Hr IV Continuous (01-08 @ 19:40)  aspirin  chewable: 81 milliGRAM(s) Oral (01-09 @ 11:25)  DOBUTamine Infusion: 6.38 mL/Hr IV Continuous (01-09 @ 07:45)  heparin  Infusion: 11 mL/Hr IV Continuous (01-10 @ 03:43)  meropenem  IVPB: 100 mL/Hr IV Intermittent (01-09 @ 21:23)    Data:  T(C): 36.6  HR: 80  BP: 110/57  RR: 23  SpO2: 100%    -WBC 33.53 / HgB 8.5 / Hct 27.7 / Plt 131  -Na 135 / Cl 103 / BUN 42 / Glucose 217  -K 4.3 / CO2 18 / Cr 2.98  - / Alk Phos 131 / T.Bili 1.1  -INR -- / PTT 35.6    Radiology:       Assessment/Plan:   55 y/o male with PMHx of hypertension, ESRD on HD, CAD (s/p PCI in 4/2024), chronic systolic heart failure, chronic ascites, recent admission for cholecystitis s/p cholecystectomy who was admitted for acute decompensated heart failure and found to have abnormal nuclear stress. LHC showed multi-vessel coronary disease, for which he underwent a CABG (LIMA - LAD | SVG - D1 | SVG - LPL) on 12/30/24. Post-op course complicated by shock requiring IABP + dobutamine, ESBL E. coli bacteremia, new-onset atrial fibrillation (1/6/25). Currently on amiodarone and IV heparin. Now s/p electrical cardioversion. IR consulted for diagnostic paracentesis and left chest tube placement.       - No drainable left pleural effusion to accommodate left chest tube, appears to be atelectatic lung on most recent CT  - Moderate volume ascites- please contact ICU team to assist in diagnostic paracentesis after appropriate hold time of heparin gtt (please clarify with EP that heparin gtt can be held post cardioversion)  - no intervention from IR at this time, please reconsult as necessary   - d/w primary team

## 2025-01-10 NOTE — PROGRESS NOTE ADULT - SUBJECTIVE AND OBJECTIVE BOX
Patient seen and examined at the bedside.    Remained critically ill on continuous ICU monitoring.    OBJECTIVE:  Vital Signs Last 24 Hrs  T(C): 36.4 (10 Aquilino 2025 16:00), Max: 36.8 (09 Jan 2025 20:00)  T(F): 97.6 (10 Aquilino 2025 16:00), Max: 98.2 (09 Jan 2025 20:00)  HR: 78 (10 Aquilino 2025 18:31) (74 - 105)  BP: 110/57 (09 Jan 2025 20:30) (100/55 - 113/56)  BP(mean): 77 (09 Jan 2025 20:30) (75 - 81)  RR: 15 (10 Aquilino 2025 18:00) (8 - 26)  SpO2: 100% (10 Aquilino 2025 18:31) (93% - 100%)    Parameters below as of 10 Aquilino 2025 18:31  Patient On (Oxygen Delivery Method): nasal cannula, high flow          Physical Exam:   General: NAD   Neurology: Alert/ oriented. follows commands, moves all extremities  Respiratory: Clear bilaterally   CV: SR  Abdominal: Soft, Nontender  Extremities: Warm, well-perfused                   Assessment:  54M with PMH of HTN, HLD, ESRD on HD MWF via LUE AVF, ascites (requiring repeat paracenteses q4-6wks), NICM with moderate LV dysfunction w/ EF 35-40%(2023) and CAD s/p atherectomy + SALLIE to D1(4/11/2024) [facility].     s/p Cholecystectomy on 11/29/24  Multi-CAD s/p C3L on 12/30/24  Hypovolemia  Post op respiratory insufficiency  Acute blood loss anemia  Thrombocytopenia  Plan:   ***Neuro***   PRNs for pain management     ***Cardiovascular***  SR / MAP 17 / CVP 18 / Hct 27.7 / Lact 0.8  [x] Dobutamine 5 mcg/kg/min   [x] Vasopressin 0.01 units/min weaned to 0.03 units/min  Amiodarone infusion for a fib - transitioned to PO once extubated   Alex weaned off 1/7  [x] ASA in setting of thrombocytopenia   Heparin for AC therapy   Vascular surgery consulted for fistula evaluation  EP consulted for postop Afib -- EDU performed today (1/10) - No HERNAN thrombus/vegetations  Cardioversion initiated post EDU - EKG Normal Sinus post cardioversion    ***Pulmonary***  [x] HFNC 30%/30L  [x] BiPAP overnight  Encourage incentive spirometry, continue pulse ox monitoring, follow ABGs   Continue bronchodilators as tolerated.  WBC increase -- Performed bronchoscopy today (1/10), Mucus plug indicated  Assess to perform Paracentesis   C/A/P performed (1/10) - Small bilateral pleural effusion    ***GI***  Tube feeds @ goal   Protonix for stress ulcer prophylaxis.   Bowel regimen with Miralax and Senna    ***Renal***  hx of ESRD iHD requiring CRRT - consider transitioning if remains off vasoactive medications.  LUE AV fistula   Nephro-Lisette for renal support  Dialysis performed yesterday (1/10), removed 2L - dialysis rescheduled for tomorrow (1/11)    ***ID***  Sepsis/septic shock due to ESBL E. coli - Continue meropenem, ongoing fevers   1/7 Bcx pending   1/5 Blood cultures negative to date  1/3 Bronch ESBL E Coli   1/2 Bcx ESBL E coli   Trend leukocytosis.    ***Endocrine***  [x] Stress Hyperglycemia : HbA1c 5.6%                - [x] ISS             - Need tight glycemic control to prevent wound infection.              Patient requires continuous monitoring with bedside rhythm monitoring, pulse oximetry monitoring, and continuous central venous and arterial pressure monitoring; and intermittent blood gas analysis. Care plan discussed with the ICU care team.   Patient remained critical, at risk for life threatening decompensation.    I have spent 60 minutes providing critical care management to this patient.    By signing my name below, I, Mel Castro, attest that this documentation has been prepared under the direction and in the presence of Jimbo Marquez NP  Electronically signed: Evelio Solitario, 01-10-25 @ 18:57    I, Jimbo Marquez NP, personally performed the services described in this documentation. all medical record entries made by the shubhamibdarlene were at my direction and in my presence. I have reviewed the chart and agree that the record reflects my personal performance and is accurate and complete  Electronically signed: Jimbo Marquez NP Patient seen and examined at the bedside.    Remained critically ill on continuous ICU monitoring.    OBJECTIVE:  Vital Signs Last 24 Hrs  T(C): 36.4 (10 Aquilino 2025 16:00), Max: 36.8 (09 Jan 2025 20:00)  T(F): 97.6 (10 Aquilino 2025 16:00), Max: 98.2 (09 Jan 2025 20:00)  HR: 78 (10 Aquilino 2025 18:31) (74 - 105)  BP: 110/57 (09 Jan 2025 20:30) (100/55 - 113/56)  BP(mean): 77 (09 Jan 2025 20:30) (75 - 81)  RR: 15 (10 Aquilino 2025 18:00) (8 - 26)  SpO2: 100% (10 Aquilino 2025 18:31) (93% - 100%)    Parameters below as of 10 Aquilino 2025 18:31  Patient On (Oxygen Delivery Method): nasal cannula, high flow          Physical Exam:   General: NAD   Neurology: Alert/ oriented. follows commands, moves all extremities  Respiratory: Clear bilaterally   CV: SR  Abdominal: Soft, Nontender  Extremities: Warm, well-perfused                   Assessment:  54M with PMH of HTN, HLD, ESRD on HD MWF via LUE AVF, ascites (requiring repeat paracenteses q4-6wks), NICM with moderate LV dysfunction w/ EF 35-40%(2023) and CAD s/p atherectomy + SALLIE to D1(4/11/2024) presents to [facility] transferred from [facility].     s/p Cholecystectomy on 11/29/24  Multi-CAD s/p C3L on 12/30/24  Hypovolemia  Post op respiratory insufficiency  Acute blood loss anemia  Thrombocytopenia  Plan:   ***Neuro***   PRNs for pain management     ***Cardiovascular***  SR / MAP 17 / CVP 18 / Hct 27.7 / Lact 0.8  [x] Dobutamine 5 mcg/kg/min   [x] Vasopressin 0.01 units/min weaned to 0.03 units/min  Amiodarone infusion for a fib - transitioned to PO once extubated   Alex weaned off 1/7  [x] ASA in setting of thrombocytopenia   Heparin for AC therapy   Vascular surgery consulted for fistula evaluation  EP consulted for postop Afib -- EDU performed today (1/10) - No HERNAN thrombus/vegetations  Cardioversion initiated post EDU - EKG Normal Sinus post cardioversion    ***Pulmonary***  [x] HFNC 30%/30L  [x] BiPAP overnight  Encourage incentive spirometry, continue pulse ox monitoring, follow ABGs   Continue bronchodilators as tolerated.  WBC increase -- Performed bronchoscopy today (1/10), Mucus plug indicated  Assess to perform Paracentesis   C/A/P performed (1/10) - Small bilateral pleural effusion    ***GI***  Tube feeds @ goal   Protonix for stress ulcer prophylaxis.   Bowel regimen with Miralax and Senna    ***Renal***  hx of ESRD iHD requiring CRRT - consider transitioning if remains off vasoactive medications.  LUE AV fistula   Nephro-Lisette for renal support  Dialysis performed yesterday (1/10), removed 2L - dialysis rescheduled for tomorrow (1/11)    ***ID***  Sepsis/septic shock due to ESBL E. coli - Continue meropenem, ongoing fevers   1/10 BAL pending  1/7 Bcx pending   1/5 Blood cultures negative to date  1/3 Bronch ESBL E Coli   1/2 Bcx ESBL E coli   Trend leukocytosis.    ***Endocrine***  [x] Stress Hyperglycemia : HbA1c 5.6%                - [x] ISS             - Need tight glycemic control to prevent wound infection.              Patient requires continuous monitoring with bedside rhythm monitoring, pulse oximetry monitoring, and continuous central venous and arterial pressure monitoring; and intermittent blood gas analysis. Care plan discussed with the ICU care team.   Patient remained critical, at risk for life threatening decompensation.    I have spent 60 minutes providing critical care management to this patient.    By signing my name below, I, Mel Castro, attest that this documentation has been prepared under the direction and in the presence of Jimbo Marquez NP  Electronically signed: Autumn Solitario, 01-10-25 @ 18:57    I, Jimbo Marquez NP, personally performed the services described in this documentation. all medical record entries made by the autumn were at my direction and in my presence. I have reviewed the chart and agree that the record reflects my personal performance and is accurate and complete  Electronically signed: Jimbo Marquez NP Patient seen and examined at the bedside.    Remained critically ill on continuous ICU monitoring.    OBJECTIVE:  Vital Signs Last 24 Hrs  T(C): 36.4 (10 Aquilino 2025 16:00), Max: 36.8 (09 Jan 2025 20:00)  T(F): 97.6 (10 Aquilino 2025 16:00), Max: 98.2 (09 Jan 2025 20:00)  HR: 78 (10 Aquilino 2025 18:31) (74 - 105)  BP: 110/57 (09 Jan 2025 20:30) (100/55 - 113/56)  BP(mean): 77 (09 Jan 2025 20:30) (75 - 81)  RR: 15 (10 Aquilino 2025 18:00) (8 - 26)  SpO2: 100% (10 Aquilino 2025 18:31) (93% - 100%)    Parameters below as of 10 Aquilino 2025 18:31  Patient On (Oxygen Delivery Method): nasal cannula, high flow          Physical Exam:   General: NAD   Neurology: Alert/ oriented. follows commands, moves all extremities  Respiratory: rhonchi bilaterally   CV: Sinus rhythm   Abdominal: Soft, Nontender  Extremities: Warm, well-perfused                   Assessment:  54M with PMH of HTN, HLD, ESRD on HD MWF via LUE AVF, ascites (requiring repeat paracenteses q4-6wks), NICM with moderate LV dysfunction w/ EF 35-40%(2023) and CAD s/p atherectomy + SALLIE to D1(4/11/2024) presents to [facility] transferred from [facility].     s/p Cholecystectomy on 11/29/24  Multi-CAD s/p C3L on 12/30/24  Hypovolemia  Post op respiratory insufficiency  Acute blood loss anemia  Thrombocytopenia  Plan:   ***Neuro***   PRNs for pain management     ***Cardiovascular***  SR / MAP 17 / CVP 18 / Hct 27.7 / Lact 0.8  [x] Dobutamine 5 mcg/kg/min   PO amio load   Alex weaned off 1/7  [x] ASA in setting of thrombocytopenia   Heparin for AC therapy   Vascular surgery consulted for fistula evaluation  EP consulted for postop Afib -- EDU performed today (1/10) - No HERNAN thrombus/vegetations  Cardioversion initiated post EDU - EKG Normal Sinus post cardioversion    ***Pulmonary***  [x] HFNC 30%/40L   Encourage incentive spirometry, continue pulse ox monitoring, follow ABGs   Continue bronchodilators as tolerated.  WBC increase -- Performed bronchoscopy today (1/10)  C/A/P performed (1/10) - Small bilateral pleural effusion    ***GI***  Tube feeds @ goal   Protonix for stress ulcer prophylaxis.   Bowel regimen with Miralax and Senna    ***Renal***  hx of ESRD iHD requiring CRRT - consider transitioning if remains off vasoactive medications.  LUE AV fistula   Nephro-Lisette for renal support  Dialysis performed yesterday (1/10), removed 2L - dialysis rescheduled for tomorrow (1/11)    ***ID***  Sepsis/septic shock due to ESBL E. coli - Continue meropenem, ongoing fevers   1/10 BAL pending  1/7 Bcx pending   1/5 Blood cultures negative to date  1/3 Bronch ESBL E Coli   1/2 Bcx ESBL E coli   Trend leukocytosis.    ***Endocrine***  [x] Stress Hyperglycemia : HbA1c 5.6%                - [x] ISS             - Need tight glycemic control to prevent wound infection.              Patient requires continuous monitoring with bedside rhythm monitoring, pulse oximetry monitoring, and continuous central venous and arterial pressure monitoring; and intermittent blood gas analysis. Care plan discussed with the ICU care team.   Patient remained critical, at risk for life threatening decompensation.    I have spent 60 minutes providing critical care management to this patient.    By signing my name below, I, Mel Castro, attest that this documentation has been prepared under the direction and in the presence of Jimbo Marquez NP  Electronically signed: Autumn Solitario, 01-10-25 @ 18:57    I, Jimbo Marquez NP, personally performed the services described in this documentation. all medical record entries made by the autumn were at my direction and in my presence. I have reviewed the chart and agree that the record reflects my personal performance and is accurate and complete  Electronically signed: Jimbo Marquez NP

## 2025-01-10 NOTE — PROGRESS NOTE ADULT - SUBJECTIVE AND OBJECTIVE BOX
Tele:  -------------------------------------------------------------------------------------------  PHYSICAL EXAM:  -120 on dobutamine 5, vasopressin 0.02  HR 80-90    GENERAL: no acute distress  NECK: JVP is difficult to assess  CHEST/LUNG: coarse breath sounds  HEART: irregularly irregular rhythm, no obvious murmurs  ABDOMEN: soft, non-tender, non-distended  EXTREMITIES:  warm, no LE edema    -------------------------------------------------------------------------------------------  RELEVANT LABS:    WBC 27 -> 33  Hgb 8.3 -> 8.5  Plt 82 -> 131    INR 1.4  PTT 30    K 3.7 -> 4.3  Mag 2.4 -> 2.5  Cr 1.99    lactate 1.5 -> 1.3  -------------------------------------------------------------------------------------------  RELEVANT IMAGING:    EC/4/25: sinus rhythm with incomplete RBBB and LAD  25: sinus rhythm with incomplete RBBB and LAD  25: Afib with incomplete RBBB and LAD, HR 100s    Echo:     25:   1. Left ventricular systolic function is severely decreased with an ejection fraction of 35 % by Michaels's method of disks. Global left ventricular hypokinesis.   2. Enlarged right ventricular cavity size and reduced right ventricular systolic function.   3. Trace pericardial effusion with no echocardiographic evidence of tamponade physiology.    25:  CONCLUSIONS:      1. Left ventricular systolic function is moderately decreased with an ejection fraction visually estimated at 40 %.   2. There is hypokinesis of the inferior and inferolateral walls.   3. Enlarged right ventricular cavity size and reduced right ventricular systolic function.    1/3/25:  CONCLUSIONS:      1. Left ventricular systolic function is moderately to severely decreased with an ejection fraction of 35 % by 3D. Global left ventricular hypokinesis.   2. Enlarged right ventricular cavity size and moderately to severely reduced right ventricular systolic function.   3. Severe tricuspid regurgitation.   4. Compared to the transthoracic echocardiogram performed on 1/3/2025, there have been no significant interval changes.    Stress Testin/20/24:   1. Myocardial Perfusion: Abnormal.   2. Baseline electrocardiogram: normal sinus rhythm at a rate of 66 bpm with q anterior leads,T wave inversion lateral leads.   3. Electrocardiogram ischemic changes: 1 mm horizontal st-t depression in inferior leads which started 1 minute into stress test and persisted 4 minutes into recovery.   4. Qualitative Perfusion:      - medium-sized, severe defect(s) in the inferior wall that is reversible suggestive of ischemia. - medium-sized, severe defect(s) in the lateral wall that is fixed suggestive of an infarction. - medium-sized, severe defect(s) in the anterior and apical wall that is fixed suggestive of an infarction.   5. The left ventricle is severely reduced in function and moderately enlarged in size. The post stress left ventricular EF is 24 %. The stress end diastolic volume is 213 ml and systolic volume is 162 ml.   6. The inferior, anterior and lateral walls are hypokinetic.   7. The above finding were discussed with  at 12:07pm.    Cath:    24:  The coronary circulation is right dominant. Cardiac catheterization  was performed electively.    LM Left main artery: Angiography shows minor irregularities.      LAD Proximal left anterior descending: There is a 70 % stenosis in the  distal third portion of the segment at branch to D1 and within  the stented segment. AUSTYN Flow 3.    First diagonal: There is a 95 % stenosis in the ostium portion of the  segment within the stented segment. AUSTYN Flow 3.    CX Mid circumflex: There is an 80 % stenosis in the middle third portion  of the segment within the stented segment. AUSTYN Flow 3.    RCA Mid right coronary artery: There is a 20 % stenosis in the proximal  third portion of the segment.  Right posterior descending artery: Angiography shows mild  atherosclerosis.  Right Posterolateral Segment: The segment is small. Angiography shows  moderate atherosclerosis.    Left Heart Cath Left ventricular function was not assessed.        CABG (24);  Coronary artery bypass grafting x3 with a free mammary to the LAD, saphenous vein graft to the diagonal branch and saphenous vein graft to the circumflex marginal branch.    (LIMA - LAD | SVG - D1 | SVG - LPL)     -------------------------------------------------------------------------------------------                 Tele: Afib with HR ~  -------------------------------------------------------------------------------------------  PHYSICAL EXAM:  -120 on dobutamine 5, vasopressin 0.02  HR     GENERAL: no acute distress  NECK: JVP is difficult to assess  CHEST/LUNG: coarse breath sounds, decreased on the left side  HEART: irregularly irregular rhythm, no obvious murmurs. (+) sternotomy  ABDOMEN: soft, non-tender, non-distended  EXTREMITIES:  warm, no LE edema    -------------------------------------------------------------------------------------------  RELEVANT LABS:    WBC 27 -> 33  Hgb 8.3 -> 8.5  Plt 82 -> 131    INR 1.4  PTT 30    K 3.7 -> 4.3  Mag 2.4 -> 2.5  Cr 1.99    lactate 1.5 -> 1.3  -------------------------------------------------------------------------------------------  RELEVANT IMAGING:    EC/4/25: sinus rhythm with incomplete RBBB and LAD  25: sinus rhythm with incomplete RBBB and LAD  25: Afib with incomplete RBBB and LAD, HR 100s    Echo:     25:   1. Left ventricular systolic function is severely decreased with an ejection fraction of 35 % by Michaels's method of disks. Global left ventricular hypokinesis.   2. Enlarged right ventricular cavity size and reduced right ventricular systolic function.   3. Trace pericardial effusion with no echocardiographic evidence of tamponade physiology.    25:  CONCLUSIONS:      1. Left ventricular systolic function is moderately decreased with an ejection fraction visually estimated at 40 %.   2. There is hypokinesis of the inferior and inferolateral walls.   3. Enlarged right ventricular cavity size and reduced right ventricular systolic function.    1/3/25:  CONCLUSIONS:      1. Left ventricular systolic function is moderately to severely decreased with an ejection fraction of 35 % by 3D. Global left ventricular hypokinesis.   2. Enlarged right ventricular cavity size and moderately to severely reduced right ventricular systolic function.   3. Severe tricuspid regurgitation.   4. Compared to the transthoracic echocardiogram performed on 1/3/2025, there have been no significant interval changes.    Stress Testin/20/24:   1. Myocardial Perfusion: Abnormal.   2. Baseline electrocardiogram: normal sinus rhythm at a rate of 66 bpm with q anterior leads,T wave inversion lateral leads.   3. Electrocardiogram ischemic changes: 1 mm horizontal st-t depression in inferior leads which started 1 minute into stress test and persisted 4 minutes into recovery.   4. Qualitative Perfusion:      - medium-sized, severe defect(s) in the inferior wall that is reversible suggestive of ischemia. - medium-sized, severe defect(s) in the lateral wall that is fixed suggestive of an infarction. - medium-sized, severe defect(s) in the anterior and apical wall that is fixed suggestive of an infarction.   5. The left ventricle is severely reduced in function and moderately enlarged in size. The post stress left ventricular EF is 24 %. The stress end diastolic volume is 213 ml and systolic volume is 162 ml.   6. The inferior, anterior and lateral walls are hypokinetic.   7. The above finding were discussed with  at 12:07pm.    Cath:    24:  The coronary circulation is right dominant. Cardiac catheterization  was performed electively.    LM Left main artery: Angiography shows minor irregularities.      LAD Proximal left anterior descending: There is a 70 % stenosis in the  distal third portion of the segment at branch to D1 and within  the stented segment. AUSTYN Flow 3.    First diagonal: There is a 95 % stenosis in the ostium portion of the  segment within the stented segment. AUSTYN Flow 3.    CX Mid circumflex: There is an 80 % stenosis in the middle third portion  of the segment within the stented segment. AUSTYN Flow 3.    RCA Mid right coronary artery: There is a 20 % stenosis in the proximal  third portion of the segment.  Right posterior descending artery: Angiography shows mild  atherosclerosis.  Right Posterolateral Segment: The segment is small. Angiography shows  moderate atherosclerosis.    Left Heart Cath Left ventricular function was not assessed.        CABG (24);  Coronary artery bypass grafting x3 with a free mammary to the LAD, saphenous vein graft to the diagonal branch and saphenous vein graft to the circumflex marginal branch.    (LIMA - LAD | SVG - D1 | SVG - LPL)     -------------------------------------------------------------------------------------------

## 2025-01-10 NOTE — PROGRESS NOTE ADULT - ASSESSMENT
54M with PMH of HTN, HLD, ESRD on HD MWF via LUE AVF, ascites (requiring repeat paracenteses q4-6wks), NICM with moderate LV dysfunction w/ EF 35-40%(2023) and CAD s/p atherectomy + SALLIE to D1(4/11/2024) presents to [facility] transferred from [facility] for CABG eval  Nephro on [facility] for HD    ESRD on HD   Regular schedule MWF   Access LUE AVF  Center [facility]  Nephrologist Dr. Bailey  Last HD on 12/24  S/p HD on 12/27 12/29, 12/30 for hyperkalemia and 12/31  2 L PUF On 01/02/2024  However hospital course now complicated by Cardiogenic shock now on multiple pressors,   CRRT initiated 01/03, off since 01/08   S/p PUF yesterday however unstable today on some pressors  Will hold off HD today and attempt tomm, discussed with primary team   Keep MAP>65  Renal Diet  Consent in physical chart    Hyper/Hypokalemia  Monitor K, will change dialysate fluid as needed    HTN  currently on pressure support  monitor bp     CKD MBD  Can send a PTH  Ca and Phos daily    Anemia  CRISTIANE with HD  Transfuse if hgb <7    CAD with multivessel disease  s/p CABG 12/30  CTICU following

## 2025-01-11 LAB
ALBUMIN SERPL ELPH-MCNC: 2.9 G/DL — LOW (ref 3.3–5)
ALT FLD-CCNC: 88 U/L — HIGH (ref 10–45)
ANION GAP SERPL CALC-SCNC: 14 MMOL/L — SIGNIFICANT CHANGE UP (ref 5–17)
APTT BLD: 40.6 SEC — HIGH (ref 24.5–35.6)
APTT BLD: 41.1 SEC — HIGH (ref 24.5–35.6)
AST SERPL-CCNC: 40 U/L — SIGNIFICANT CHANGE UP (ref 10–40)
BASOPHILS # BLD AUTO: 0.05 K/UL — SIGNIFICANT CHANGE UP (ref 0–0.2)
BASOPHILS NFR BLD AUTO: 0.2 % — SIGNIFICANT CHANGE UP (ref 0–2)
BILIRUB SERPL-MCNC: 1 MG/DL — SIGNIFICANT CHANGE UP (ref 0.2–1.2)
BUN SERPL-MCNC: 56 MG/DL — HIGH (ref 7–23)
CALCIUM SERPL-MCNC: 8.4 MG/DL — SIGNIFICANT CHANGE UP (ref 8.4–10.5)
CO2 SERPL-SCNC: 18 MMOL/L — LOW (ref 22–31)
CREAT SERPL-MCNC: 4.1 MG/DL — HIGH (ref 0.5–1.3)
EGFR: 16 ML/MIN/1.73M2 — LOW
EOSINOPHIL # BLD AUTO: 0.76 K/UL — HIGH (ref 0–0.5)
GAS PNL BLDA: SIGNIFICANT CHANGE UP
GAS PNL BLDV: SIGNIFICANT CHANGE UP
GLUCOSE SERPL-MCNC: 173 MG/DL — HIGH (ref 70–99)
GRAM STN FLD: SIGNIFICANT CHANGE UP
HCT VFR BLD CALC: 25.8 % — LOW (ref 39–50)
HGB BLD-MCNC: 8.1 G/DL — LOW (ref 13–17)
IMM GRANULOCYTES NFR BLD AUTO: 2.8 % — HIGH (ref 0–0.9)
LYMPHOCYTES # BLD AUTO: 0.54 K/UL — LOW (ref 1–3.3)
LYMPHOCYTES # BLD AUTO: 1.8 % — LOW (ref 13–44)
MAGNESIUM SERPL-MCNC: 2.6 MG/DL — SIGNIFICANT CHANGE UP (ref 1.6–2.6)
MCHC RBC-ENTMCNC: 30.2 PG — SIGNIFICANT CHANGE UP (ref 27–34)
MCHC RBC-ENTMCNC: 31.4 G/DL — LOW (ref 32–36)
MCV RBC AUTO: 96.3 FL — SIGNIFICANT CHANGE UP (ref 80–100)
MONOCYTES # BLD AUTO: 0.88 K/UL — SIGNIFICANT CHANGE UP (ref 0–0.9)
MONOCYTES NFR BLD AUTO: 3 % — SIGNIFICANT CHANGE UP (ref 2–14)
NEUTROPHILS # BLD AUTO: 26.56 K/UL — HIGH (ref 1.8–7.4)
NEUTROPHILS NFR BLD AUTO: 89.6 % — HIGH (ref 43–77)
NRBC # BLD: 0 /100 WBCS — SIGNIFICANT CHANGE UP (ref 0–0)
NRBC BLD-RTO: 0 /100 WBCS — SIGNIFICANT CHANGE UP (ref 0–0)
PHOSPHATE SERPL-MCNC: 4.4 MG/DL — SIGNIFICANT CHANGE UP (ref 2.5–4.5)
PLATELET # BLD AUTO: 164 K/UL — SIGNIFICANT CHANGE UP (ref 150–400)
POTASSIUM SERPL-MCNC: 4.3 MMOL/L — SIGNIFICANT CHANGE UP (ref 3.5–5.3)
POTASSIUM SERPL-SCNC: 4.3 MMOL/L — SIGNIFICANT CHANGE UP (ref 3.5–5.3)
PROT SERPL-MCNC: 6 G/DL — SIGNIFICANT CHANGE UP (ref 6–8.3)
RBC # BLD: 2.68 M/UL — LOW (ref 4.2–5.8)
RBC # FLD: 19.9 % — HIGH (ref 10.3–14.5)
SODIUM SERPL-SCNC: 132 MMOL/L — LOW (ref 135–145)
SPECIMEN SOURCE: SIGNIFICANT CHANGE UP
SPECIMEN SOURCE: SIGNIFICANT CHANGE UP
WBC # BLD: 29.61 K/UL — HIGH (ref 3.8–10.5)
WBC # FLD AUTO: 29.61 K/UL — HIGH (ref 3.8–10.5)

## 2025-01-11 PROCEDURE — 71045 X-RAY EXAM CHEST 1 VIEW: CPT | Mod: 26,77

## 2025-01-11 PROCEDURE — 99222 1ST HOSP IP/OBS MODERATE 55: CPT

## 2025-01-11 PROCEDURE — 99291 CRITICAL CARE FIRST HOUR: CPT

## 2025-01-11 PROCEDURE — 99291 CRITICAL CARE FIRST HOUR: CPT | Mod: FS

## 2025-01-11 PROCEDURE — 71045 X-RAY EXAM CHEST 1 VIEW: CPT | Mod: 26,76

## 2025-01-11 PROCEDURE — 99232 SBSQ HOSP IP/OBS MODERATE 35: CPT

## 2025-01-11 PROCEDURE — G0545: CPT

## 2025-01-11 RX ORDER — ACETYLCYSTEINE 200 MG/ML
4 INHALANT RESPIRATORY (INHALATION) ONCE
Refills: 0 | Status: COMPLETED | OUTPATIENT
Start: 2025-01-11 | End: 2025-01-11

## 2025-01-11 RX ORDER — SODIUM BICARBONATE 1 MEQ/ML
50 SYRINGE (ML) INTRAVENOUS ONCE
Refills: 0 | Status: COMPLETED | OUTPATIENT
Start: 2025-01-11 | End: 2025-01-11

## 2025-01-11 RX ORDER — ALBUMIN (HUMAN) 12.5 G/50ML
100 INJECTION, SOLUTION INTRAVENOUS EVERY 8 HOURS
Refills: 0 | Status: COMPLETED | OUTPATIENT
Start: 2025-01-11 | End: 2025-01-12

## 2025-01-11 RX ORDER — TOBRAMYCIN 300 MG/4ML
300 SOLUTION RESPIRATORY (INHALATION) EVERY 12 HOURS
Refills: 0 | Status: DISCONTINUED | OUTPATIENT
Start: 2025-01-11 | End: 2025-01-19

## 2025-01-11 RX ORDER — TRAZODONE HCL 100 MG
100 TABLET ORAL AT BEDTIME
Refills: 0 | Status: DISCONTINUED | OUTPATIENT
Start: 2025-01-11 | End: 2025-01-24

## 2025-01-11 RX ORDER — LIDOCAINE HYDROCHLORIDE 20 MG/ML
14 JELLY TOPICAL ONCE
Refills: 0 | Status: COMPLETED | OUTPATIENT
Start: 2025-01-11 | End: 2025-01-11

## 2025-01-11 RX ORDER — AMIKACIN SULFATE 250 MG/ML
430 VIAL (ML) INJECTION ONCE
Refills: 0 | Status: COMPLETED | OUTPATIENT
Start: 2025-01-11 | End: 2025-01-11

## 2025-01-11 RX ADMIN — Medication 40 MILLIGRAM(S): at 12:05

## 2025-01-11 RX ADMIN — ACETYLCYSTEINE 4 MILLILITER(S): 200 INHALANT RESPIRATORY (INHALATION) at 11:28

## 2025-01-11 RX ADMIN — LIDOCAINE HYDROCHLORIDE 2 PATCH: 20 JELLY TOPICAL at 10:37

## 2025-01-11 RX ADMIN — Medication 1 APPLICATION(S): at 22:05

## 2025-01-11 RX ADMIN — TOBRAMYCIN 300 MILLIGRAM(S): 300 SOLUTION RESPIRATORY (INHALATION) at 17:28

## 2025-01-11 RX ADMIN — DOBUTAMINE 3.83 MICROGRAM(S)/KG/MIN: 250 INJECTION INTRAVENOUS at 18:13

## 2025-01-11 RX ADMIN — BUDESONIDE 0.25 MILLIGRAM(S): 0.25 SUSPENSION RESPIRATORY (INHALATION) at 05:40

## 2025-01-11 RX ADMIN — INSULIN LISPRO 2: 100 INJECTION, SOLUTION INTRAVENOUS; SUBCUTANEOUS at 00:46

## 2025-01-11 RX ADMIN — MIDODRINE HYDROCHLORIDE 10 MILLIGRAM(S): 5 TABLET ORAL at 12:04

## 2025-01-11 RX ADMIN — Medication 4 MILLILITER(S): at 17:27

## 2025-01-11 RX ADMIN — Medication 10 MILLILITER(S): at 08:31

## 2025-01-11 RX ADMIN — AMIODARONE HYDROCHLORIDE 400 MILLIGRAM(S): 50 INJECTION, SOLUTION INTRAVENOUS at 21:33

## 2025-01-11 RX ADMIN — GABAPENTIN 100 MILLIGRAM(S): 400 CAPSULE ORAL at 06:41

## 2025-01-11 RX ADMIN — LIDOCAINE HYDROCHLORIDE 2 PATCH: 20 JELLY TOPICAL at 19:00

## 2025-01-11 RX ADMIN — INSULIN LISPRO 2: 100 INJECTION, SOLUTION INTRAVENOUS; SUBCUTANEOUS at 13:00

## 2025-01-11 RX ADMIN — Medication 4 MILLILITER(S): at 05:40

## 2025-01-11 RX ADMIN — IPRATROPIUM BROMIDE AND ALBUTEROL SULFATE 3 MILLILITER(S): .5; 2.5 SOLUTION RESPIRATORY (INHALATION) at 11:28

## 2025-01-11 RX ADMIN — GABAPENTIN 100 MILLIGRAM(S): 400 CAPSULE ORAL at 16:05

## 2025-01-11 RX ADMIN — IPRATROPIUM BROMIDE AND ALBUTEROL SULFATE 3 MILLILITER(S): .5; 2.5 SOLUTION RESPIRATORY (INHALATION) at 17:27

## 2025-01-11 RX ADMIN — Medication 101.72 MILLIGRAM(S): at 18:23

## 2025-01-11 RX ADMIN — Medication 81 MILLIGRAM(S): at 12:04

## 2025-01-11 RX ADMIN — LIDOCAINE HYDROCHLORIDE 2 PATCH: 20 JELLY TOPICAL at 22:06

## 2025-01-11 RX ADMIN — AMIODARONE HYDROCHLORIDE 400 MILLIGRAM(S): 50 INJECTION, SOLUTION INTRAVENOUS at 06:41

## 2025-01-11 RX ADMIN — Medication 100 MILLIGRAM(S): at 21:33

## 2025-01-11 RX ADMIN — MEROPENEM 100 MILLIGRAM(S): 1 INJECTION INTRAVENOUS at 21:33

## 2025-01-11 RX ADMIN — ALBUMIN (HUMAN) 50 MILLILITER(S): 12.5 INJECTION, SOLUTION INTRAVENOUS at 18:13

## 2025-01-11 RX ADMIN — ALBUMIN (HUMAN) 50 MILLILITER(S): 12.5 INJECTION, SOLUTION INTRAVENOUS at 10:39

## 2025-01-11 RX ADMIN — IPRATROPIUM BROMIDE AND ALBUTEROL SULFATE 3 MILLILITER(S): .5; 2.5 SOLUTION RESPIRATORY (INHALATION) at 23:24

## 2025-01-11 RX ADMIN — AMIODARONE HYDROCHLORIDE 400 MILLIGRAM(S): 50 INJECTION, SOLUTION INTRAVENOUS at 15:40

## 2025-01-11 RX ADMIN — Medication 50 MILLIEQUIVALENT(S): at 13:20

## 2025-01-11 RX ADMIN — LIDOCAINE HYDROCHLORIDE 14 MILLILITER(S): 20 JELLY TOPICAL at 05:00

## 2025-01-11 RX ADMIN — HEPARIN SODIUM 13 UNIT(S)/HR: 1000 INJECTION INTRAVENOUS; SUBCUTANEOUS at 08:32

## 2025-01-11 RX ADMIN — Medication 1 APPLICATION(S): at 06:42

## 2025-01-11 RX ADMIN — Medication 1 TABLET(S): at 12:05

## 2025-01-11 RX ADMIN — DOBUTAMINE 6.38 MICROGRAM(S)/KG/MIN: 250 INJECTION INTRAVENOUS at 08:32

## 2025-01-11 RX ADMIN — IPRATROPIUM BROMIDE AND ALBUTEROL SULFATE 3 MILLILITER(S): .5; 2.5 SOLUTION RESPIRATORY (INHALATION) at 05:39

## 2025-01-11 RX ADMIN — Medication 4 MILLILITER(S): at 11:36

## 2025-01-11 NOTE — PROVIDER CONTACT NOTE (OTHER) - BACKGROUND
12/30 s/p C3L course c/b reintubation and LV dysfxn. Pt receiving frequent bronchs. Failed Sp& Sw today plan for FEEST tomorrow. Pt corrected by MD Pakr this morning for chewing gum.

## 2025-01-11 NOTE — PROGRESS NOTE ADULT - ASSESSMENT
54M with PMH of HTN, HLD, ESRD on HD MWF via LUE AVF, ascites (requiring repeat paracenteses q4-6wks), NICM with moderate LV dysfunction w/ EF 35-40%(2023) and CAD s/p atherectomy + SALLIE to D1(4/11/2024) presents to [facility] transferred from [facility] for CABG eval  Nephro on [facility] for HD    ESRD on HD   Regular schedule MWF   Access LUE AVF  Center [facility]  Nephrologist Dr. Bailey  Last HD on 12/24  S/p HD on 12/27 12/29, 12/30 for hyperkalemia and 12/31  2 L PUF On 01/02/2024  However hospital course now complicated by Cardiogenic shock now on multiple pressors,   CRRT initiated 01/03, off since 01/08   S/p PUF on 01/09   HD held on 01/10 due to instability, Will trial HD today if tolerates  Keep MAP>65  Renal Diet  Consent in physical chart    Hyper/Hypokalemia  Monitor K, will change dialysate fluid as needed    HTN  currently on pressure support  monitor bp     CKD MBD  Can send a PTH  Ca and Phos daily    Anemia  CRISTIANE with HD  Transfuse if hgb <7    CAD with multivessel disease  s/p CABG 12/30  CTICU following

## 2025-01-11 NOTE — SWALLOW BEDSIDE ASSESSMENT ADULT - COMMENTS
ESRD on dialysis  Multi-CAD s/p C3L on 12/30/24  Post operatively, patient developed worsening cardiogenic shock requiring inotropic support and an IABP; reintubated 1/02  Per ID, pt hypotensive, febrile and reintubated; Pt pancultured. and started on zosyn- meropenem and gent; pt found to have Gram negative bacteremia;   E coli +ESBL bacteremia  1/8: extubated to 50/50 HFNC  1/11: Findings: thick mucoid secretion in Left bronchus, poor cough effort   Bronchoscope inserted Right nare. Airway evaluation revealed Sharp Abena. AUGUSTO and LLL evaluation revealed inflamed airway, thick mucoid secretion. RUL, RML, RLL revealed minimal secretion. Bronchoscope then withdrawn from airway.  No complication.  Post xray shows improved aeration of Left lung. remaining trials deferred to FEES

## 2025-01-11 NOTE — PROGRESS NOTE ADULT - SUBJECTIVE AND OBJECTIVE BOX
MR#[id number]  PATIENT NAME:RAAD CANO    DATE OF SERVICE: 25 @ 07:56  Patient was seen and examined by Solo Quick MD on    25 @ 07:56 .  Interim events noted.Consultant notes ,Labs,Telemetry reviewed by me       HOSPITAL COURSE: HPI:  54M with PMH of HTN, HLD, ESRD on HD MWF via LUE AVF, ascites (requiring repeat paracenteses q4-6wks), NICM with moderate LV dysfunction w/ EF 35-40%() and CAD s/p atherectomy + SALLIE to D1(2024) presents to [facility] transferred from [facility]. Patient initially presented to [facility] ED with shortness of breath. Of note he was recently admitted from - for acute cholecystitis s/p cholecystectomy. In [facility] ED, pt was hypoxic to 86% on RA, trop 1.7K, EKG no new changes, CXR fluid overload. Admitted for AHRF 2/2 fluid overload. Pt received HD on , ,  and . DAPT was resumed, TTE showed improvement in LVEF to 40-45%. Stress test was abnormal with 1mm inferior STD, reversible ischemia in the inferior wall and fixed defects in the lateral, anterior and apical walls with post stress EF of 24%. Pt was then transferred to [facility] for cardiac cath which showed pLAD 70% ISR, mLCx 70%, D1 severe dz. Patient now transferred to [facility] CTS Dr. Rivers for CABG eval. Patient denies any current SOB or chest pain on admission. Otherwise denies headaches, abdominal pain, urinary or bowel changes, fevers, chills, N/V/D, sick contacts.  (24 Dec 2024 05:07)          INTERIM EVENTS:Patient seen at bedside ,interim events noted.     Cardiac cath  pLAD 70% ISR, mLCx 70%, D1 severe dz.   -POD#1-C3L free LIMA-LAD; SVG-Diag; SVG-leftPL Awake OOB extubated Sinus rhythm on Dobutamine gtt  - Was intubated last night for airway protection s/p bronchoscopy This morning had IABP inserted  remains sedated intubated Sinus Rhythm  - s/p IABP insertion, sepsis/septic shock due to GNR/ECOLI -Paracentesis for suspected bacterial peritonitis  Transfused 1u pbrcs overnight  Remains on inoptropes and pressors with IABP at 1:1  -Intunated on IABP 1:1,Alex@20ppm,on ,weaning pressors  -POD#7-Remains intubated on CRRT-IABP 1:3,off pressors on Dobutrex in Atrial Fibrillation  -Intibated on Alex 10ppm,IABP dce'd,off Levophed,remains on Dobutrex-Atrial Fibrillation  -Remains intubated had bronch earlier for cupious secretions-On Dobutrex,AF~~90's On CRRT  -Extubated on HFNC,no dyspnea alert remains in AF  01/10-OOB Awake  on BiPAP +,  -Was cardioverted yesterday remains in Sinus rhythm-had Bronch earlier L lung mucus-on  On HFNC-40%/30L      MEDICATIONS  (STANDING):  albuterol/ipratropium for Nebulization 3 milliLiter(s) Nebulizer every 6 hours  aMIOdarone    Tablet   Oral   aMIOdarone    Tablet 400 milliGRAM(s) Oral every 8 hours  aspirin  chewable 81 milliGRAM(s) Oral daily  bisacodyl Suppository 10 milliGRAM(s) Rectal once  buDESOnide    Inhalation Suspension 0.25 milliGRAM(s) Inhalation every 12 hours  chlorhexidine 2% Cloths 1 Application(s) Topical daily  chlorhexidine 4% Liquid 1 Application(s) Topical <User Schedule>  dexMEDEtomidine Infusion 0.5 MICROgram(s)/kG/Hr (10.6 mL/Hr) IV Continuous <Continuous>  dextrose 50% Injectable 50 milliLiter(s) IV Push every 15 minutes  dextrose 50% Injectable 25 milliLiter(s) IV Push every 15 minutes  DOBUTamine Infusion 5 MICROgram(s)/kG/Min (6.38 mL/Hr) IV Continuous <Continuous>  epoetin ignacia (EPOGEN) Injectable 70300 Unit(s) IV Push <User Schedule>  gabapentin 100 milliGRAM(s) Oral every 12 hours  heparin  Infusion 300 Unit(s)/Hr (13 mL/Hr) IV Continuous <Continuous>  insulin lispro (ADMELOG) corrective regimen sliding scale   SubCutaneous every 6 hours  lidocaine   4% Patch 2 Patch Transdermal daily  meropenem  IVPB 500 milliGRAM(s) IV Intermittent every 24 hours  Nephro-elicia 1 Tablet(s) Oral daily  pantoprazole  Injectable 40 milliGRAM(s) IV Push daily  polyethylene glycol 3350 17 Gram(s) Oral two times a day  senna 2 Tablet(s) Oral at bedtime  sodium chloride 0.9%. 1000 milliLiter(s) (10 mL/Hr) IV Continuous <Continuous>  sodium chloride 3%  Inhalation 4 milliLiter(s) Inhalation every 6 hours  vasopressin Infusion 0.03 Unit(s)/Min (4.5 mL/Hr) IV Continuous <Continuous>    MEDICATIONS  (PRN):  lidocaine 2% Gel 1 Application(s) Topical daily PRN pre HD  midodrine 10 milliGRAM(s) Oral every 8 hours PRN pre-HD  sodium chloride 0.9% lock flush 10 milliLiter(s) IV Push every 1 hour PRN Pre/post blood products, medications, blood draw, and to maintain line patency            REVIEW OF SYSTEMS:  Constitutional: [ ] fever, [ ]weight loss,  [ x]fatigue [ ]weight gain  Eyes: [ ] visual changes  Respiratory: [ ]shortness of breath;  [x ] cough, [ ]wheezing, [ ]chills, [ ]hemoptysis  Cardiovascular: [ ] chest pain, [ ]palpitations, [ ]dizziness,  [ ]leg swelling[ ]orthopnea[ ]PND  Gastrointestinal: [ ] abdominal pain, [ ]nausea, [ ]vomiting,  [ ]diarrhea [ ]Constipation [ ]Melena  Genitourinary: [ ] dysuria, [ ] hematuria [ ]Vigil  Neurologic: [ ] headaches [ ] tremors[ ]weakness [ ]Paralysis Right[ ] Left[ ]  Skin: [ ] itching, [ ]burning, [ ] rashes  Endocrine: [ ] heat or cold intolerance  Musculoskeletal: [ ] joint pain or swelling; [ ] muscle, back, or extremity pain  Psychiatric: [ ] depression, [ ]anxiety, [ ]mood swings, or [ ]difficulty sleeping  Hematologic: [ ] easy bruising, [ ] bleeding gums    [ ] All remaining systems negative except as per above.   [ ]Unable to obtain.  [x] No change in ROS since admission      Vital Signs Last 24 Hrs  T(C): 36.6 (2025 04:00), Max: 36.6 (10 Aquilino 2025 12:00)  T(F): 97.8 (2025 04:00), Max: 97.9 (10 Aquilino 2025 12:00)  HR: 81 (2025 06:00) (74 - 105)  RR: 10 (2025 06:00) (8 - 26)  SpO2: 98% (2025 06:00) (94% - 100%)  Hemodynamics-SR / MAP 52 / CVP 12/ Hct 25.8 / Lact 0.7      Parameters below as of 2025 05:49  Patient On (Oxygen Delivery Method): nasal cannula, high flow      I&O's Summary    10 Aquilino 2025 07:01  -  2025 07:00  --------------------------------------------------------  IN: 761.1 mL / OUT: 0 mL / NET: 761.1 mL        PHYSICAL EXAM:  General: No acute distress BMI-30  HEENT: EOMI, PERRL  Neck: Supple, [ ] JVD  Lungs:  Fair air entry bilaterally; [ ] rales [ ] wheezing [x ] rhonchi  Heart: Regular rate and rhythm; [x ] murmur   2/6 [ x] systolic [ ] diastolic [ ] radiation[ ] rubs [ ]  gallops  Abdomen: Nontender, bowel sounds present  Extremities: No clubbing, cyanosis, [ ] edema [ ]Pulses  equal and intact  Nervous system:  Alert & Oriented X3, no focal deficits  Psychiatric: Normal affect  Skin: No rashes or lesions    LABS:      132[L]  |  100  |  56[H]  ----------------------------<  173[H]  4.3   |  18[L]  |  4.10[H]    Ca    8.4      2025 00:32  Phos  4.4       Mg     2.6         TPro  6.0  /  Alb  2.9[L]  /  TBili  1.0  /  DBili  x   /  AST  40  /  ALT  88[H]  /  AlkPhos  102  01-11    Creatinine Trend: 4.10<--, 2.98<--, 1.99<--, 2.00<--, 2.17<--, 2.45<--                        8.1    29.61 )-----------( 164      ( 2025 00:32 )             25.8     PTT - ( 2025 04:17 )  PTT:31.8 sec     EDU W or WO Ultrasound Enhancing Agent (01.10.25 @ 11:57) >  CONCLUSIONS:      1. Bedside Limited EDU in CTU. No gastric Images obtained.   2. No evidence of left atrial or left atrial appendage thrombus.   3. Left ventricular systolic function is severely decreased.   4. Enlarged right ventricular cavity size and reduced right ventricular systolic function.   5. Tricuspid annular dilation. Moderate tricuspid regurgitation.   6. No obvious echocardiographic evidence of vegetations.          CT Abdomen and Pelvis w/ IV Cont (01.10.25 @ 15:41) >    IMPRESSION: Scattered consolidation in theleft lung most severe in the  lower lobe.    Nonocclusive thrombus right internal jugular vein.    Large volume ascites.

## 2025-01-11 NOTE — CONSULT NOTE ADULT - TIME BILLING
chart and data review, clinical assessment, and coordination of care. This excludes any time spent on separate procedures or teaching.
- Review of records, telemetry, vital signs and daily labs.   - General and cardiovascular physical examination.  - Generation of cardiovascular treatment plan and completion of note .  - Coordination of care.      Patient was seen and examined by me on  12/24/24,interim events noted,labs and radiology studies reviewed.  Solo Quick MD,FACC.  [address] [place], [place] [id number].  [phone]  Availabe to call or text on [employer] TEAMS.

## 2025-01-11 NOTE — CONSULT NOTE ADULT - ASSESSMENT
Assessment  54 year old male with HTN, HLD, ESRD on HD, ascites, CHF, and CAD s/p PCI (4/2024) transferred to [facility] from [facility] for cardiac catheterization due to abnormal stress test. Cath showed multivessel disease now s/p CABG 12/30/24. Course c/b acute on chronic hypoxemic respiratory failure in setting of recurrent mucus plugging on left s/p multiple bronchoscopies. Patient is on HFNC saturating well on FiO2 30% 30 LPM.    Recommendations  Aggressive pulmonary toilet  ICU team to add Volara to airway clearance regimen  Nebulized bronchodilator Q8H followed by Nebulized 3% Saline Q8H + PRN  Consider when in bed to keep left side up when able to promote postural drainage    Discussed with ICU Team

## 2025-01-11 NOTE — PROGRESS NOTE ADULT - SUBJECTIVE AND OBJECTIVE BOX
Patient seen and examined at the bedside.    Remained critically ill on continuous ICU monitoring.    OBJECTIVE:  Vital Signs Last 24 Hrs  T(C): 36.8 (11 Jan 2025 16:25), Max: 37 (11 Jan 2025 12:00)  T(F): 98.2 (11 Jan 2025 16:25), Max: 98.6 (11 Jan 2025 12:00)  HR: 81 (11 Jan 2025 19:00) (79 - 88)  BP: 130/67 (11 Jan 2025 19:00) (112/63 - 136/63)  BP(mean): 92 (11 Jan 2025 19:00) (82 - 93)  RR: 13 (11 Jan 2025 19:00) (10 - 37)  SpO2: 100% (11 Jan 2025 19:00) (95% - 100%)    Parameters below as of 11 Jan 2025 18:21  Patient On (Oxygen Delivery Method): nasal cannula, high flow          Physical Exam:   General: NAD, resting comfortably in the chair on HFNC   Neurology: Alert/ oriented. follows commands, moves all extremities, ambulating without issues   Respiratory: rhonchi bilaterally, diminished breath sounds on L   CV: Sinus rhythm   Abdominal: Soft, Nontender, + BMs, + abd distension, paracentesis site--cdi   Extremities: Warm, well-perfused, LUE AV fistula with + thrill                  Assessment:  54M with PMH of HTN, HLD, ESRD on HD MWF via LUE AVF, ascites (requiring repeat paracenteses q4-6wks), NICM with moderate LV dysfunction w/ EF 35-40%(2023) and CAD s/p atherectomy + SALLIE to D1(4/11/2024) presents to [facility] transferred from [facility].     s/p Cholecystectomy on 11/29/24  Multi-CAD s/p C3L on 12/30/24  Hypovolemia  Post op respiratory insufficiency  Acute blood loss anemia  Leukocytosis  Plan:   ***Neuro***   Trazodone at night for improved sleep   PRNs for pain management     ***Cardiovascular***  SR / MAP 75 / CVP 10 / Hct 25.8% / Lact 0.9  [x] Dobutamine 5 mcg/kg/min weaned to 3 mcg/kg/min  [x] Vasopressin - 0.03 Unit(s)/Min  Alex weaned off 1/7  Amiodarone for afib prophylaxis   [x] ASA   Heparin for AC therapy s/p cardioversion 1/10  EP consulted for postop Afib -- EDU performed yesterday (1/10) - No HERNAN thrombus/vegetations  Cardioversion initiated post EDU - EKG Normal Sinus post cardioversion    ***Pulmonary***  [x] HFNC 40%/30L --> 30%/30L  Patient with continuous collapse of L lung on CXR, bronched this AM again with mild improvement   Encourage incentive spirometry, continue pulse ox monitoring, follow ABGs   Continue bronchodilators as tolerated. Adding Volera and pulm cx   Bronched overnight last night, plan for bronchoscopy tomorrow (1/12)  C/A/P performed (1/10) - Small bilateral pleural effusion w/ scattered consolidation throughout left lung field     ***GI***  NPO at MN for Bronch procedure tomorrow  Tube feeds @ goal -->S&S consult for diet progression   Protonix for stress ulcer prophylaxis.   Bowel regimen with Miralax and Senna    ***Renal***  hx of ESRD iHD requiring CRRT initially post op   LUE AV fistula   Nephro-Lisette for renal support  Dialysis performed today for 2L of removal via AVF , giving unit of PRBCs with HD today   Paracentesis performed today - Removed 1L    ***ID***  Sepsis/septic shock due to ESBL E. coli - Continue meropenem, ongoing fevers   Mitch Nebs for Respiratory infection  1/10 BAL pending  1/7 Bcx pending   1/5 Blood cultures negative to date  1/3 Bronch ESBL E Coli   1/2 Bcx ESBL E coli   Trend leukocytosis.    ***Endocrine***  [x] Stress Hyperglycemia : HbA1c 5.6%                - [x] ISS             - Need tight glycemic control to prevent wound infection.              Patient requires continuous monitoring with bedside rhythm monitoring, pulse oximetry monitoring, and continuous central venous and arterial pressure monitoring; and intermittent blood gas analysis. Care plan discussed with the ICU care team.   Patient remained critical, at risk for life threatening decompensation.    I have spent 60 minutes providing critical care management to this patient.    By signing my name below, I, Mel Castro, attest that this documentation has been prepared under the direction and in the presence of Jimbo Marquez NP  Electronically signed: Evelio Solitario, 01-11-25 @ 19:11    I, Jimbo Marquez NP, personally performed the services described in this documentation. all medical record entries made by the scribe were at my direction and in my presence. I have reviewed the chart and agree that the record reflects my personal performance and is accurate and complete  Electronically signed: Jimbo Marquez NP Patient seen and examined at the bedside.    Remained critically ill on continuous ICU monitoring.    OBJECTIVE:  Vital Signs Last 24 Hrs  T(C): 36.8 (11 Jan 2025 16:25), Max: 37 (11 Jan 2025 12:00)  T(F): 98.2 (11 Jan 2025 16:25), Max: 98.6 (11 Jan 2025 12:00)  HR: 81 (11 Jan 2025 19:00) (79 - 88)  BP: 130/67 (11 Jan 2025 19:00) (112/63 - 136/63)  BP(mean): 92 (11 Jan 2025 19:00) (82 - 93)  RR: 13 (11 Jan 2025 19:00) (10 - 37)  SpO2: 100% (11 Jan 2025 19:00) (95% - 100%)    Parameters below as of 11 Jan 2025 18:21  Patient On (Oxygen Delivery Method): nasal cannula, high flow          Physical Exam:   General: NAD, resting comfortably in the chair on HFNC   Neurology: Alert/ oriented. follows commands, moves all extremities, ambulating without issues   Respiratory: rhonchi bilaterally, diminished breath sounds on L   CV: Sinus rhythm   Abdominal: Soft, Nontender, + BMs, + abd distension, paracentesis site--clean/dry/intact   Extremities: Warm, well-perfused, LUE AV fistula with + thrill                  Assessment:  54M with PMH of HTN, HLD, ESRD on HD MWF via LUE AVF, ascites (requiring repeat paracenteses q4-6wks), NICM with moderate LV dysfunction w/ EF 35-40%(2023) and CAD s/p atherectomy + SALLIE to D1(4/11/2024) presents to [facility] transferred from [facility].     s/p Cholecystectomy on 11/29/24  Multi-CAD s/p C3L on 12/30/24  Hypovolemia  Post op respiratory insufficiency  Acute blood loss anemia  Leukocytosis  Plan:   ***Neuro***   Trazodone at night for improved sleep   PRNs for pain management     ***Cardiovascular***  SR / MAP 75 / CVP 10 / Hct 25.8% / Lact 0.9  [x] Dobutamine 5 mcg/kg/min weaned to 3 mcg/kg/min  Alex weaned off 1/7  EP consulted for postop Afib -- EDU performed yesterday (1/10) - No HERNAN thrombus/vegetations  Cardioversion initiated post EDU - EKG Normal Sinus post cardioversion, Amiodarone for afib prophylaxis post DCCV   [x] ASA   Heparin for AC therapy s/p cardioversion 1/10  EP consulted for postop Afib -- EDU performed yesterday (1/10) - No HERNAN thrombus/vegetations      ***Pulmonary***  [x] HFNC 40%/30L --> 30%/30L  Patient with continuous collapse of L lung on CXR, bronched this AM again with mild improvement   Encourage incentive spirometry, continue pulse ox monitoring, follow ABGs   Continue bronchodilators as tolerated. Adding Volera and pulm cx   Plan for bronchoscopy tomorrow (1/12)  C/A/P performed (1/10) - Small bilateral pleural effusion w/ scattered consolidation throughout left lung field     ***GI***  NPO at MN for Bronch procedure tomorrow  Tube feeds @ goal -->S&S consult for diet progression   Protonix for stress ulcer prophylaxis.   Bowel regimen with Miralax and Senna    ***Renal***  hx of ESRD iHD requiring CRRT initially post op   LUE AV fistula   Nephro-Lisette for renal support  Dialysis performed today for 2L of removal via AVF , giving unit of PRBCs with HD today   Paracentesis performed today - Removed 1L    ***ID***  Sepsis/septic shock due to ESBL E. coli - Continue meropenem, ongoing fevers   Mitch Nebs for Respiratory infection  1/10 BAL pending  1/7 Bcx pending   1/5 Blood cultures negative to date  1/3 Bronch ESBL E Coli   1/2 Bcx ESBL E coli   Trend leukocytosis.    ***Endocrine***  [x] Stress Hyperglycemia : HbA1c 5.6%                - [x] ISS             - Need tight glycemic control to prevent wound infection.              Patient requires continuous monitoring with bedside rhythm monitoring, pulse oximetry monitoring, and continuous central venous and arterial pressure monitoring; and intermittent blood gas analysis. Care plan discussed with the ICU care team.   Patient remained critical, at risk for life threatening decompensation.    I have spent 60 minutes providing critical care management to this patient.    By signing my name below, I, Mel Castro, attest that this documentation has been prepared under the direction and in the presence of Jimbo Marquez NP  Electronically signed: Mel CastroAutumn, 01-11-25 @ 19:11    I, Jimbo Marquez NP, personally performed the services described in this documentation. all medical record entries made by the autumn were at my direction and in my presence. I have reviewed the chart and agree that the record reflects my personal performance and is accurate and complete  Electronically signed: Jimbo Marquez NP

## 2025-01-11 NOTE — OCCUPATIONAL THERAPY INITIAL EVALUATION ADULT - PERTINENT HX OF CURRENT PROBLEM, REHAB EVAL
54M with PMH of HTN, HLD, ESRD on HD MWF via LUE AVF, ascites (requiring repeat paracenteses q4-6wks), NICM with moderate LV dysfunction w/ EF 35-40%(2023) and CAD s/p atherectomy + SALLIE to D1(4/11/2024) presents to [facility] transferred from [facility]. Patient initially presented to [facility] ED with shortness of breath. Of note he was recently admitted from 09/27-12/02 for acute cholecystitis s/p cholecystectomy. In [facility] ED, pt was hypoxic to 86% on RA, trop 1.7K, EKG no new changes, CXR fluid overload. Admitted for AHRF 2/2 fluid overload. Pt received HD on 12/18, 12/19, 12/20 and 12/22. DAPT was resumed, TTE showed improvement in LVEF to 40-45%. Stress test was abnormal with 1mm inferior STD, reversible ischemia in the inferior wall and fixed defects in the lateral, anterior and apical walls with post stress EF of 24%. Pt was then transferred to [facility] for cardiac cath which showed pLAD 70% ISR, mLCx 70%, D1 severe dz. Patient now transferred to [facility] CTS Dr. Rivers for CABG eval. Patient denies any current SOB or chest pain on admission. Otherwise denies headaches, abdominal pain, urinary or bowel changes, fevers, chills, N/V/D, sick contacts.  (24 Dec 2024 05:07)  s/p Cholecystectomy on 11/29/24  Multi-CAD s/p C3L on 12/30/24

## 2025-01-11 NOTE — OCCUPATIONAL THERAPY INITIAL EVALUATION ADULT - BED MOBILITY TRAINING, PT EVAL
GOAL: Pt will perform supine<>sit with IND in order to improve bed mobility in 4 weeks. Complex Repair And Ftsg Text: The defect edges were debeveled with a #15 scalpel blade.  The primary defect was closed partially with a complex linear closure.  Given the location of the defect, shape of the defect and the proximity to free margins a full thickness skin graft was deemed most appropriate to repair the remaining defect.  The graft was trimmed to fit the size of the remaining defect.  The graft was then placed in the primary defect, oriented appropriately, and sutured into place.

## 2025-01-11 NOTE — OCCUPATIONAL THERAPY INITIAL EVALUATION ADULT - ADDITIONAL COMMENTS
Pt lives in private house with 5 stairs to enter and no stairs inside. Lives with multiple family members. Pt independently performs ADLs and ambulates without AD. Pt has walk in shower.

## 2025-01-11 NOTE — PROGRESS NOTE ADULT - ASSESSMENT
54M with PMH of HTN, HLD, ESRD on HD MWF via LUE AVF, ascites (requiring repeat paracenteses q4-6wks), NICM with moderate LV dysfunction w/ EF 35-40%() and CAD s/p atherectomy + SALLIE to D1(2024) presents to [facility] transferred from [facility]. Patient initially presented to [facility] ED with shortness of breath. Of note he was recently admitted from - for acute cholecystitis s/p cholecystectomy. In [facility] ED, pt was hypoxic to 86% on RA, trop 1.7K, EKG no new changes, CXR fluid overload. Admitted for AHRF 2/2 fluid overload. Pt received HD on , ,  and . DAPT was resumed, TTE showed improvement in LVEF to 40-45%. Stress test was abnormal with 1mm inferior STD, reversible ischemia in the inferior wall and fixed defects in the lateral, anterior and apical walls with post stress EF of 24%. Pt was then transferred to [facility] for cardiac cath which showed pLAD 70% ISR, mLCx 70%, D1 severe dz. Patient now transferred to [facility] CTS Dr. Rivers for CABG eval. Patient denies any current SOB or chest pain on admission. Otherwise denies headaches, abdominal pain, urinary or bowel changes, fevers, chills, N/V/D, sick contacts.  (24 Dec 2024 05:07)   VSS  CP free   HD via avf  for daily HD pre op- on lovenox 30 qd    HD today continue with present meds. Plan for CABG possibleTVR next week   vss; rsr 55-80; hd today w/ 1 unit prbc; pt to be dialzyed also this  w/ another 1 unit prbc   VSS; RSR 60-80; continue asa/bb/ statin; HD in am - transfuse 1 unit prbc with HD; d/c lovenox after am dose sun    - Pt underwent  C3L free LIMA-LAD; SVG-Diag; SVG-leftPL  Pt given cefuroxime perioperatively   pt extubated   . pt with hypotension and ECHO showing Systolic HF/depressed BIV Function, currently supported with /pressors.  Labs this evening showing transaminitis  1/2 -3 pt hypotensive, febrile and reintubated  Pt pancultured. and started on zosyn- meropenem and gent  pt found to have Gram negative bacteremia    E coli +ESBL bacteremia        A/P  #sepsis  ? source - many options. Pt with abnormal u/a but is a dialysis pt so hard to interpret. Pt with h//o SBP- pt with ascites   s/p  paracentesis 1/3  cultures No Growth to date  - no organisms notes on gram stain  Likely from lungs - CT with consolidation Left lung, especially LLL  Continue meropenem  lines  were  changed, s/p d/c shiley   Pt with worsening WBC. ? from plug  Ct with left sided consolidation- check cultures. also with large ascites. team to do PARACENTESIS   check c diff if develops diarrhea  check bladder scan- to make sure no urinary source- not distended on CT   U/S of graft  with stenosis but normal function.  IF NO ANSWER AFTER ABOVE THEN CONSDIER wbc TAGGED STUDY        #elevated LFTs  likely shock liver  acute  hepatitis serology- negative   trend  avoid hepatotoxic meds    improving daily         #thrombocytopenia   HIt ab negative  Likely from sepsis vs from IABP or drugs   trend   iMPROVING     #Renal failure  going back to Hemodialysis   changed the meropenem to daily dosing - FOR RENAL FUNCTION        Godfrey Iglesias MD  pager [phone]  office [phone]  Over the weekend. Please call for acute issues or questions. [phone]     54M with PMH of HTN, HLD, ESRD on HD MWF via LUE AVF, ascites (requiring repeat paracenteses q4-6wks), NICM with moderate LV dysfunction w/ EF 35-40%() and CAD s/p atherectomy + SALLIE to D1(2024) presents to [facility] transferred from [facility]. Patient initially presented to [facility] ED with shortness of breath. Of note he was recently admitted from - for acute cholecystitis s/p cholecystectomy. In [facility] ED, pt was hypoxic to 86% on RA, trop 1.7K, EKG no new changes, CXR fluid overload. Admitted for AHRF 2/2 fluid overload. Pt received HD on , ,  and . DAPT was resumed, TTE showed improvement in LVEF to 40-45%. Stress test was abnormal with 1mm inferior STD, reversible ischemia in the inferior wall and fixed defects in the lateral, anterior and apical walls with post stress EF of 24%. Pt was then transferred to [facility] for cardiac cath which showed pLAD 70% ISR, mLCx 70%, D1 severe dz. Patient now transferred to [facility] CTS Dr. Rivers for CABG eval. Patient denies any current SOB or chest pain on admission. Otherwise denies headaches, abdominal pain, urinary or bowel changes, fevers, chills, N/V/D, sick contacts.  (24 Dec 2024 05:07)   VSS  CP free   HD via avf  for daily HD pre op- on lovenox 30 qd    HD today continue with present meds. Plan for CABG possibleTVR next week   vss; rsr 55-80; hd today w/ 1 unit prbc; pt to be dialzyed also this  w/ another 1 unit prbc   VSS; RSR 60-80; continue asa/bb/ statin; HD in am - transfuse 1 unit prbc with HD; d/c lovenox after am dose sun    - Pt underwent  C3L free LIMA-LAD; SVG-Diag; SVG-leftPL  Pt given cefuroxime perioperatively   pt extubated   . pt with hypotension and ECHO showing Systolic HF/depressed BIV Function, currently supported with /pressors.  Labs this evening showing transaminitis  1/2 -3 pt hypotensive, febrile and reintubated  Pt pancultured. and started on zosyn- meropenem and gent  pt found to have Gram negative bacteremia    E coli +ESBL bacteremia        A/P  #sepsis  ? source - many options. Pt with abnormal u/a but is a dialysis pt so hard to interpret. Pt with h//o SBP- pt with ascites   s/p  paracentesis 1/3  cultures No Growth to date  - no organisms notes on gram stain  Likely from lungs - CT with consolidation Left lung, especially LLL  Continue meropenem  lines  were  changed, s/p d/c shiley   Pt with worsening WBC. ? from plug  Ct with left sided consolidation- check cultures. also with large ascites. team to do PARACENTESIS   check c diff if develops diarrhea  check bladder scan- to make sure no urinary source- not distended on CT   U/S of graft  with stenosis but normal function.  IF NO ANSWER AFTER ABOVE THEN CONSIDER wbc TAGGED STUDY        #elevated LFTs  likely shock liver  acute  hepatitis serology- negative   trend  avoid hepatotoxic meds    improving daily         #thrombocytopenia   HIt ab negative  Likely from sepsis vs from IABP or drugs   trend   iMPROVING     #Renal failure  going back to Hemodialysis   changed the meropenem to daily dosing - FOR RENAL FUNCTION        Godfrey Iglesias MD  pager [phone]  office [phone]  Over the weekend. Please call for acute issues or questions. [phone]

## 2025-01-11 NOTE — SWALLOW BEDSIDE ASSESSMENT ADULT - SLP GENERAL OBSERVATIONS
Upright in chair; +ngt; voice subjectively wfl however pt c/o hoarse/scratchy quality; mild discomfort.

## 2025-01-11 NOTE — PROGRESS NOTE ADULT - ASSESSMENT
54M with PMH of HTN, HLD, ESRD on HD MWF via LUE AVF, ascites (requiring repeat paracenteses q4-6wks), NICM with moderate LV dysfunction w/ EF 35-40%() and CAD s/p atherectomy + SALLIE to D1(2024) presents to [facility] transferred from [facility]. Patient initially presented to [facility] ED with shortness of breath. Of note he was recently admitted from - for acute cholecystitis s/p cholecystectomy. In [facility] ED, pt was hypoxic to 86% on RA, trop 1.7K, EKG no new changes, CXR fluid overload. Admitted for AHRF 2/2 fluid overload. Pt received HD on , ,  and . DAPT was resumed, TTE showed improvement in LVEF to 40-45%. Stress test was abnormal with 1mm inferior STD, reversible ischemia in the inferior wall and fixed defects in the lateral, anterior and apical walls with post stress EF of 24%.     Pt was then transferred to [facility] for cardiac cath which showed pLAD 70% ISR, mLCx 70%, D1 severe dz.     Patient now transferred to [facility] CTS Dr. Rivers for CABG eval.      .  # CAD s/p NSTEMI  Hx CAD s/p PCI LAD   Presented with ADHF elevated troponins  Positive NST1-Cath- pLAD 70% ISR, mLCx 70%, D1 severe dz.   TTE EF 35% Severe TRWas on Plavix-p2y12- 321 been off Plavix since -POD#1-C3L free LIMA-LAD; SVG-Diag; DBQ-vnvoSU-Zrgflgrmk on  Sinus rhythm,Amio for AF prophylaxis  -OOB off  Sinus rhythm   -POD#3-On /pressors-EF 25-30% RV failure with transaminitis-Sinus Ltjufn44/03-POD#4-Was intubated for hypoxia-gradual hypotension on /pressors had IABP inserted this morning  -POD#5-s/p IABP insertion, sepsis/septic shock due to GNR/ECOLI HD cath placed   Bronched yesterday 1/3 - minimal secretions with rust-tinged sputum     ESBL-E Coli bacteremia on meropenam    - POD#7 Atrial Fib ,remains intubated weaning IABP 1:3,off pressors on CRRT  -IABP removed.Remains on vent-Alex 10ppm,off Levo- CVP 10 / MAP 71 / Hct 25.6% / Lactate 1.7-Atrial Fibrillation  -Intubated on Alex 10ppm, gtt, BP better-AF~~90's on Amio-had bronch still febrile Tmax 101  -Extubated awake alert on HFNC AF,on Dobutrex-CRRT,Cultures no growth    01/10-OOB on BiPAP Sinus Rhythm on -SR/ MAP 57/ CVP 10/  Hct 26.7 / Lact 1.4  -s/p EDU/DCCV-Sinus rhythm on -SR / MAP 52 / CVP 12/ Hct 25.8 / Lact 0.7-had bronch with BAL Left lung    # Acute on Chronic Systolic Heart Failure  EF-35% ,severe TR  Had HD post op  GDMT post op  LVEF improved post bypass but now at 23-30%-BiV dysfunction on  now off pressors  -TTE EF 40%,trace effusion  ECG c/w pericarditis-on colchicine     Vascular evaluated  AVGraft /?steal causing RV failure-'' Very low suspicion of steal Sd and AVF causing current clinical state. ''   VA Duplex Hemodialysis Access, Left (25 @ 13:35) >   Arterial flow volume of 1301 mL/m, and the venous flow volume of  1492 mL/m are consistent with a normally functioning dialysis access  fistula.        # Ascites  Hx chronic ascites  Has drainage q4-6 weeks  Last drained -4600mL  ? ascites related to severe TR  Had xskkpbbkdbcs-Laidfhy-lz growth   CT Abdomen 01/10-Large volume ascites-  Monitor      # ESRD  Now on CRRT transition to HD once off vasodilators-Vaso

## 2025-01-11 NOTE — PROCEDURE NOTE - NSBRONCHPROCDETAILS_GEN_A_CORE_FT
Indication: Left lung white out    History: s/p CABG, recent ESBL E Coli Pneumonia/bacteremia recurrent mucus plugging     Preop medication: Precedex     Xray Findings: Left lung opacification     Findings: thick mucoid secretion in Left bronchus, poor cough effort   Bronchoscope inserted Right nare. Airway evaluation revealed Sharp Abena. AUGUSTO and LLL evaluation revealed inflamed airway, thick mucoid secretion. RUL, RML, RLL revealed minimal secretion. Bronchoscope then withdrawn from airway.  No complication.  Post xray shows improved aeration of Left lung.     Specimens: BAL sent

## 2025-01-11 NOTE — PROVIDER CONTACT NOTE (OTHER) - ACTION/TREATMENT ORDERED:
MD Park and RN. Family warned that visitation will be restricted if they bring the pt food again. RN passed information along in report to following night RN.

## 2025-01-11 NOTE — PROVIDER CONTACT NOTE (OTHER) - SITUATION
Nurse entered room and questioned the smell of peanut butter, noticed patient chewing on something. Wife at the bedside

## 2025-01-11 NOTE — PROGRESS NOTE ADULT - SUBJECTIVE AND OBJECTIVE BOX
Patient is a 54y old  Male who presents with a chief complaint of CAD s/p CABG (10 Aquilino 2025 06:24)    Being followed by ID for leucocytosis, ESBL bacteremia    Interval history:  Afebrile after 1/7/25  No acute events      ROS:  No cough, SOB, CP  No N/V/D./abd pain  No other complaints      Antimicrobials:    meropenem  IVPB 500 milliGRAM(s) IV Intermittent every 24 hours      Vital Signs Last 24 Hrs  T(C): 36.6 (01-11-25 @ 04:00), Max: 36.6 (01-10-25 @ 12:00)  T(F): 97.8 (01-11-25 @ 04:00), Max: 97.9 (01-10-25 @ 12:00)  HR: 81 (01-11-25 @ 06:00) (74 - 105)  BP: --  BP(mean): --  RR: 10 (01-11-25 @ 06:00) (8 - 26)  SpO2: 98% (01-11-25 @ 06:00) (94% - 100%)    Physical Exam:    Constitutional well preserved, NAD    HEENT EOMI, No pallor or icterus, HFNC    No oral exudate or erythema    Neck supple no LN    Chest Scattered rhonchi    CVS S1 S2 WNl No murmur or rub or gallop    Abd soft BS normal No tenderness no masses    Ext No cyanosis clubbing or edema, LUE AVF    IV site no erythema tenderness or discharge    Joints no swelling or LOM    CNS AAO X 3 non focal    Lab Data:                          8.1    29.61 )-----------( 164      ( 11 Jan 2025 00:32 )             25.8       01-11    132[L]  |  100  |  56[H]  ----------------------------<  173[H]  4.3   |  18[L]  |  4.10[H]    Ca    8.4      11 Jan 2025 00:32  Phos  4.4     01-11  Mg     2.6     01-11    TPro  6.0  /  Alb  2.9[L]  /  TBili  1.0  /  DBili  x   /  AST  40  /  ALT  88[H]  /  AlkPhos  102  01-11        Bronchial  01-10-25 --  --    No polymorphonuclear leukocytes seen per low power field  No squamous epithelial cells seen per low power field  No organisms seen per oil power field      .Blood BLOOD  01-09-25   No growth at 24 hours  --  --      .Blood BLOOD  01-07-25   No growth at 72 Hours  --  --      .Blood BLOOD  01-05-25   No growth at 5 days  --  --      .Blood BLOOD  01-05-25   No growth at 5 days  --  --      < from: CT Abdomen and Pelvis w/ IV Cont (01.10.25 @ 15:41) >  ACC: [id number] EXAM:  CT ABDOMEN AND PELVIS IC   ORDERED BY:  ANTONIO BOWERS     ACC: [id number] EXAM:  CT CHEST IC   ORDERED BY:  ANTONIO BOWERS     PROCEDURE DATE:  01/10/2025          INTERPRETATION:  CLINICAL INFORMATION: r/o infection. hx esbl Admitting   Dxs: I25.10 ATHSCL HEART DISEASE OF NATIVE CORONARY ARTERY W/O ANG PCTRS   MCT    COMPARISON: 11.27.24  11.4.23.    CONTRAST/COMPLICATIONS:  IV Contrast: Omnipaque 350 (accession [id number]), IV contrast documented   in unlinked concurrent exam (accession [id number])  90 cc administered   10   cc discarded  Oral Contrast: NONE  .    PROCEDURE:  CT of the Chest, Abdomen and Pelvis was performed.  Sagittal and coronal reformats were performed.    FINDINGS:  CHEST:  LUNGS AND LARGE AIRWAYS: Patent central airways. Right lower lobe   subsegmental atelectasis. Scattered consolidation in the left lung most   severe in the lower lobe.  PLEURA: Small pleural effusions.  VESSELS: A right internal jugular vein thrombus. Left IJ line.  HEART: Cardiomegaly. No pericardial effusion. Status post CABG.  MEDIASTINUM AND CASSANDRA: No lymphadenopathy.  CHEST WALL AND LOWER NECK: Sternotomy.    ABDOMEN AND PELVIS:  LIVER: Within normal limits.  BILE DUCTS: Normal caliber.  GALLBLADDER: Status post cholecystectomy.  SPLEEN: Within normal limits.  PANCREAS: Within normal limits.  ADRENALS: Within normal limits.  KIDNEYS/URETERS: Atrophic kidneys.    BLADDER: Within normal limits.  REPRODUCTIVE ORGANS: Within normal limits.    BOWEL: No bowel obstruction. Nasogastric tube terminates in the stomach.   Normal appendix.  PERITONEUM/RETROPERITONEUM: Large volume ascites.  VESSELS:  Within normal limits.  ABDOMINAL WALL: Ventral hernia mesh.  BONES: Within normal limits.    IMPRESSION: Scattered consolidation in the left lung most severe in the   lower lobe.    Nonocclusive thrombus right internal jugular vein.    Large volume ascites.    < end of copied text >                     Patient is a 54y old  Male who presents with a chief complaint of CAD s/p CABG (10 Aquilino 2025 06:24)    Being followed by ID for leucocytosis, ESBL bacteremia    Interval history:  Afebrile after 1/7/25  No acute events      ROS: wants chicken soup  No cough, SOB, CP  No N/V/D./abd pain  No other complaints      Antimicrobials:    meropenem  IVPB 500 milliGRAM(s) IV Intermittent every 24 hours      Vital Signs Last 24 Hrs  T(C): 36.6 (01-11-25 @ 04:00), Max: 36.6 (01-10-25 @ 12:00)  T(F): 97.8 (01-11-25 @ 04:00), Max: 97.9 (01-10-25 @ 12:00)  HR: 81 (01-11-25 @ 06:00) (74 - 105)  BP: --  BP(mean): --  RR: 10 (01-11-25 @ 06:00) (8 - 26)  SpO2: 98% (01-11-25 @ 06:00) (94% - 100%)    Physical Exam:    Constitutional well preserved, NAD    HEENT EOMI, No pallor or icterus, HFNC    No oral exudate or erythema    Neck supple no LN    Chest Scattered rhonchi, sternotomy wound dressing dry/intact, able to use incentive spiromter    CVS S1 S2 WNl No murmur or rub or gallop    Abd soft BS normal No tenderness no masses    Ext No cyanosis clubbing or edema, LUE AVF, Left IJ TLC site non-tender    IV site no erythema tenderness or discharge    Joints no swelling or LOM    CNS AAO X 3 non focal    Lab Data:                          8.1    29.61 )-----------( 164      ( 11 Jan 2025 00:32 )             25.8       01-11    132[L]  |  100  |  56[H]  ----------------------------<  173[H]  4.3   |  18[L]  |  4.10[H]    Ca    8.4      11 Jan 2025 00:32  Phos  4.4     01-11  Mg     2.6     01-11    TPro  6.0  /  Alb  2.9[L]  /  TBili  1.0  /  DBili  x   /  AST  40  /  ALT  88[H]  /  AlkPhos  102  01-11        Bronchial  01-10-25 --  --    No polymorphonuclear leukocytes seen per low power field  No squamous epithelial cells seen per low power field  No organisms seen per oil power field      .Blood BLOOD  01-09-25   No growth at 24 hours  --  --      .Blood BLOOD  01-07-25   No growth at 72 Hours  --  --      .Blood BLOOD  01-05-25   No growth at 5 days  --  --      .Blood BLOOD  01-05-25   No growth at 5 days  --  --      < from: CT Abdomen and Pelvis w/ IV Cont (01.10.25 @ 15:41) >  ACC: [id number] EXAM:  CT ABDOMEN AND PELVIS IC   ORDERED BY:  ANTONIO BOWERS     ACC: [id number] EXAM:  CT CHEST IC   ORDERED BY:  ANTONIO BOWERS     PROCEDURE DATE:  01/10/2025          INTERPRETATION:  CLINICAL INFORMATION: r/o infection. hx esbl Admitting   Dxs: I25.10 ATHSCL HEART DISEASE OF NATIVE CORONARY ARTERY W/O ANG PCTRS   MCT    COMPARISON: 11.27.24 11.4.23.    CONTRAST/COMPLICATIONS:  IV Contrast: Omnipaque 350 (accession [id number]), IV contrast documented   in unlinked concurrent exam (accession [id number])  90 cc administered   10   cc discarded  Oral Contrast: NONE  .    PROCEDURE:  CT of the Chest, Abdomen and Pelvis was performed.  Sagittal and coronal reformats were performed.    FINDINGS:  CHEST:  LUNGS AND LARGE AIRWAYS: Patent central airways. Right lower lobe   subsegmental atelectasis. Scattered consolidation in the left lung most   severe in the lower lobe.  PLEURA: Small pleural effusions.  VESSELS: A right internal jugular vein thrombus. Left IJ line.  HEART: Cardiomegaly. No pericardial effusion. Status post CABG.  MEDIASTINUM AND CASSANDRA: No lymphadenopathy.  CHEST WALL AND LOWER NECK: Sternotomy.    ABDOMEN AND PELVIS:  LIVER: Within normal limits.  BILE DUCTS: Normal caliber.  GALLBLADDER: Status post cholecystectomy.  SPLEEN: Within normal limits.  PANCREAS: Within normal limits.  ADRENALS: Within normal limits.  KIDNEYS/URETERS: Atrophic kidneys.    BLADDER: Within normal limits.  REPRODUCTIVE ORGANS: Within normal limits.    BOWEL: No bowel obstruction. Nasogastric tube terminates in the stomach.   Normal appendix.  PERITONEUM/RETROPERITONEUM: Large volume ascites.  VESSELS:  Within normal limits.  ABDOMINAL WALL: Ventral hernia mesh.  BONES: Within normal limits.    IMPRESSION: Scattered consolidation in the left lung most severe in the   lower lobe.    Nonocclusive thrombus right internal jugular vein.    Large volume ascites.    < end of copied text >

## 2025-01-11 NOTE — CONSULT NOTE ADULT - SUBJECTIVE AND OBJECTIVE BOX
CHIEF COMPLAINT:  Recurrent mucus plug on left    HPI:  54 year old male with HTN, HLD, ESRD on HD, ascites, CHF, and CAD s/p PCI (4/2024) transferred to [facility] from [facility] for cardiac catheterization due to abnormal stress test. Cath showed multivessel disease now s/p CABG 12/30/24. Patient was extubated 12/31 however required re-intubation 1/2/25 for hypoxemia in setting of hypotension s/p IABP placement. IABP removed 1/6. Bronchoscopy 1/8 demonstrates thick white secretions on left which were aspirated. He was extubated later that day. Awake bronchoscopy repeated 1/9 and 1/10 for mucus plugging on left. Presently patient is saturating well on HFNC 30% FiO2 at 30 LPM. He is expectorating secretions using Yankauer. BAL grew ESBL e. coli 1/3. Repeat BAL 1/10/25 with no growth to date. Patient is a former smoker. Quit approx 5 years ago. Denies respiratory problems in past.    PAST MEDICAL & SURGICAL HISTORY:  Type 2 diabetes mellitus      Hypertension      End stage renal disease  started HD 2/2019 T, Th, Sat via right chest permacath      Bone spur  right shoulder- hx of - sx done      Anemia      Injury of right wrist  hx of at age 14      History of hyperkalemia  before HD- K-6.2 - to repeat in am of sx, pt had HD today      S/P arthroscopy of right shoulder  2010      History of vascular access device  right chest permacath - 2/2019      H/O right wrist surgery  tendons repair - at age 14      AV fistula      H/O ventral hernia repair      S/P cholecystectomy          FAMILY HISTORY:  FH: hypertension  mother, father- alive    FH: type 2 diabetes  mother, father- alive        SOCIAL HISTORY:  Smoking: [ ] Never Smoked [x] Former Smoker. Social smoker (<1 PPD) for many years. Unable to quantify. Quit 5 years ago.  Substance Use: [ ] Never Used [ ] Used ____  EtOH Use:  Marital Status: [ ] Single [ ]  [ ]  [ ]   Sexual History:   Occupation:  Recent Travel:  Country of Birth:  Advance Directives:    Allergies    No Known Allergies    Intolerances        HOME MEDICATIONS:    REVIEW OF SYSTEMS:  CONSTITUTIONAL: No fever, weight loss, or fatigue  EYES: No eye pain, visual disturbances, or discharge  ENMT:  No difficulty hearing, tinnitus, vertigo; No sinus or throat pain  NECK: No pain or stiffness  RESPIRATORY: Productive sputum. Cough. No wheezing.  CARDIOVASCULAR: No chest pain, palpitations, dizziness, or leg swelling  GASTROINTESTINAL: No abdominal or epigastric pain. No nausea, vomiting, or hematemesis; No diarrhea or constipation. No melena or hematochezia.  GENITOURINARY: No dysuria, frequency, hematuria, or incontinence  NEUROLOGICAL: No headaches, memory loss, loss of strength, numbness, or tremors  SKIN: No itching, burning, rashes, or lesions   LYMPH NODES: No enlarged glands  ENDOCRINE: No heat or cold intolerance; No hair loss  MUSCULOSKELETAL: No joint pain or swelling; No muscle, back, or extremity pain  PSYCHIATRIC: No depression, anxiety, mood swings, or difficulty sleeping  HEME/LYMPH: No easy bruising, or bleeding gums  ALLERGY AND IMMUNOLOGIC: No hives or eczema    OBJECTIVE:  ICU Vital Signs Last 24 Hrs  T(C): 36.4 (11 Jan 2025 08:00), Max: 36.6 (11 Jan 2025 00:00)  T(F): 97.6 (11 Jan 2025 08:00), Max: 97.8 (11 Jan 2025 00:00)  HR: 88 (11 Jan 2025 10:25) (74 - 88)  BP: 112/63 (11 Jan 2025 07:42) (112/63 - 112/63)  BP(mean): 82 (11 Jan 2025 07:42) (82 - 82)  ABP: 116/38 (11 Jan 2025 10:25) (96/34 - 167/51)  ABP(mean): 56 (11 Jan 2025 10:25) (51 - 77)  RR: 27 (11 Jan 2025 10:25) (8 - 27)  SpO2: 100% (11 Jan 2025 10:25) (95% - 100%)    O2 Parameters below as of 11 Jan 2025 08:00  Patient On (Oxygen Delivery Method): nasal cannula, high flow  O2 Flow (L/min): 40  O2 Concentration (%): 30          01-10 @ 07:01  -  01-11 @ 07:00  --------------------------------------------------------  IN: 761.1 mL / OUT: 0 mL / NET: 761.1 mL    01-11 @ 07:01 - 01-11 @ 12:22  --------------------------------------------------------  IN: 35.8 mL / OUT: 0 mL / NET: 35.8 mL      CAPILLARY BLOOD GLUCOSE      POCT Blood Glucose.: 160 mg/dL (11 Jan 2025 12:19)      PHYSICAL EXAM:  General: No apparent distress  HEENT: NC/AT  Neck: Supple  Respiratory: Decreased BS on left. Clear on right. No wheezing.   Cardiovascular: RRR, no LE edema  Abdomen: Soft, nontender, nondistended  Extremities: Warm  Skin: Intact  Neurological: A&Ox3, no focal deficits  Psychiatry: Normal mood and affect    HOSPITAL MEDICATIONS:  aspirin  chewable 81 milliGRAM(s) Oral daily  heparin  Infusion 300 Unit(s)/Hr IV Continuous <Continuous>    meropenem  IVPB 500 milliGRAM(s) IV Intermittent every 24 hours  tobramycin for Nebulization 300 milliGRAM(s) Inhalation every 12 hours    aMIOdarone    Tablet   Oral   aMIOdarone    Tablet 400 milliGRAM(s) Oral every 8 hours  DOBUTamine Infusion 3 MICROgram(s)/kG/Min IV Continuous <Continuous>  midodrine 10 milliGRAM(s) Oral every 8 hours PRN    dextrose 50% Injectable 50 milliLiter(s) IV Push every 15 minutes  dextrose 50% Injectable 25 milliLiter(s) IV Push every 15 minutes  insulin lispro (ADMELOG) corrective regimen sliding scale   SubCutaneous every 6 hours  vasopressin Infusion 0.03 Unit(s)/Min IV Continuous <Continuous>    albuterol/ipratropium for Nebulization 3 milliLiter(s) Nebulizer every 6 hours  sodium chloride 3%  Inhalation 4 milliLiter(s) Inhalation every 6 hours    dexMEDEtomidine Infusion 0.5 MICROgram(s)/kG/Hr IV Continuous <Continuous>  gabapentin 100 milliGRAM(s) Oral every 12 hours    bisacodyl Suppository 10 milliGRAM(s) Rectal once  pantoprazole  Injectable 40 milliGRAM(s) IV Push daily  polyethylene glycol 3350 17 Gram(s) Oral two times a day  senna 2 Tablet(s) Oral at bedtime        albumin human 25% IVPB 100 milliLiter(s) IV Intermittent every 8 hours  Nephro-elicia 1 Tablet(s) Oral daily  sodium chloride 0.9% lock flush 10 milliLiter(s) IV Push every 1 hour PRN  sodium chloride 0.9%. 1000 milliLiter(s) IV Continuous <Continuous>    epoetin ignacia (EPOGEN) Injectable 14802 Unit(s) IV Push <User Schedule>    chlorhexidine 2% Cloths 1 Application(s) Topical daily  chlorhexidine 4% Liquid 1 Application(s) Topical <User Schedule>  lidocaine   4% Patch 2 Patch Transdermal daily  lidocaine 2% Gel 1 Application(s) Topical daily PRN        LABS:                        8.1    29.61 )-----------( 164      ( 11 Jan 2025 00:32 )             25.8     Hgb Trend: 8.1<--, 8.5<--, 8.3<--, 8.2<--, 8.3<--  01-11    132[L]  |  100  |  56[H]  ----------------------------<  173[H]  4.3   |  18[L]  |  4.10[H]    Ca    8.4      11 Jan 2025 00:32  Phos  4.4     01-11  Mg     2.6     01-11    TPro  6.0  /  Alb  2.9[L]  /  TBili  1.0  /  DBili  x   /  AST  40  /  ALT  88[H]  /  AlkPhos  102  01-11    Creatinine Trend: 4.10<--, 2.98<--, 1.99<--, 2.00<--, 2.17<--, 2.45<--  PTT - ( 11 Jan 2025 04:17 )  PTT:31.8 sec  Urinalysis Basic - ( 11 Jan 2025 00:32 )    Color: x / Appearance: x / SG: x / pH: x  Gluc: 173 mg/dL / Ketone: x  / Bili: x / Urobili: x   Blood: x / Protein: x / Nitrite: x   Leuk Esterase: x / RBC: x / WBC x   Sq Epi: x / Non Sq Epi: x / Bacteria: x      Arterial Blood Gas:  01-11 @ 09:15  7.34/38/95/20/98.8/-4.8  ABG lactate: --  Arterial Blood Gas:  01-11 @ 00:20  7.36/36/104/20/100.0/-4.7  ABG lactate: --  Arterial Blood Gas:  01-10 @ 17:30  7.35/39/85/22/98.8/-3.8  ABG lactate: --  Arterial Blood Gas:  01-10 @ 00:27  7.39/35/72/21/96.9/-3.3  ABG lactate: --    Venous Blood Gas:  01-10 @ 00:27  7.35/39/46/22/79.4  VBG Lactate: 1.3      MICROBIOLOGY:     RADIOLOGY:  [x] Reviewed and interpreted by me  CXR with recurrent left sided near opacification

## 2025-01-11 NOTE — PROGRESS NOTE ADULT - SUBJECTIVE AND OBJECTIVE BOX
Patient seen and examined at the bedside.    Remained critically ill on continuous ICU monitoring.    OBJECTIVE:  Vital Signs Last 24 Hrs  T(C): 36.6 (11 Jan 2025 04:00), Max: 36.6 (10 Aquilino 2025 12:00)  T(F): 97.8 (11 Jan 2025 04:00), Max: 97.9 (10 Aquilino 2025 12:00)  HR: 81 (11 Jan 2025 06:00) (74 - 105)  BP: --  BP(mean): --  RR: 10 (11 Jan 2025 06:00) (8 - 26)  SpO2: 98% (11 Jan 2025 06:00) (94% - 100%)    Parameters below as of 11 Jan 2025 05:49  Patient On (Oxygen Delivery Method): nasal cannula, high flow          Physical Exam:   General: NAD   Neurology: Alert/ oriented. follows commands, moves all extremities  Respiratory: rhonchi bilaterally   CV: Sinus rhythm   Abdominal: Soft, Nontender  Extremities: Warm, well-perfused                   Assessment:  54M with PMH of HTN, HLD, ESRD on HD MWF via LUE AVF, ascites (requiring repeat paracenteses q4-6wks), NICM with moderate LV dysfunction w/ EF 35-40%(2023) and CAD s/p atherectomy + SALLIE to D1(4/11/2024) presents to [facility] transferred from [facility].     s/p Cholecystectomy on 11/29/24  Multi-CAD s/p C3L on 12/30/24  Hypovolemia  Post op respiratory insufficiency  Acute blood loss anemia  Thrombocytopenia        Plan:   ***Neuro***   Sedated with Precedex   PRNs for pain management     ***Cardiovascular***  SR / MAP 52 / CVP 12/ Hct 25.8 / Lact 0.7  [x] Dobutamine 5 mcg/kg/min   [x] Vasopressin 0.03 units   Alex weaned off 1/7  [x] ASA in setting of thrombocytopenia   Heparin for AC therapy   Vascular surgery consulted for fistula evaluation  EP consulted for postop Afib -- EDU performed today (1/10) - No HERNAN thrombus/vegetations  Cardioversion initiated post EDU - EKG Normal Sinus post cardioversion    ***Pulmonary***  [x] HFNC 40%/30L   Encourage incentive spirometry, continue pulse ox monitoring, follow ABGs   Continue bronchodilators as tolerated.  WBC increase -- Performed bronchoscopy today (1/10)  C/A/P performed (1/10) - Small bilateral pleural effusion    ***GI***  Tube feeds @ goal   Protonix for stress ulcer prophylaxis.   Bowel regimen with Miralax and Senna    ***Renal***  hx of ESRD iHD requiring CRRT - consider transitioning if remains off vasoactive medications.  LUE AV fistula   Nephro-Lisette for renal support  Dialysis performed yesterday (1/10), removed 2L - dialysis rescheduled for tomorrow (1/11)    ***ID***  Sepsis/septic shock due to ESBL E. coli - Continue meropenem, ongoing fevers   1/10 BAL pending  1/7 Bcx pending   1/5 Blood cultures negative to date  1/3 Bronch ESBL E Coli   1/2 Bcx ESBL E coli   Trend leukocytosis.    ***Endocrine***  [x] Stress Hyperglycemia : HbA1c 5.6%                - [x] ISS             - Need tight glycemic control to prevent wound infection.          Patient requires continuous monitoring with bedside rhythm monitoring, pulse oximetry monitoring, and continuous central venous and arterial pressure monitoring; and intermittent blood gas analysis. Care plan discussed with the ICU care team.   Patient remained critical, at risk for life threatening decompensation.    I have spent 30 minutes providing critical care management to this patient.    By signing my name below, I, Sonia Sánchez, attest that this documentation has been prepared under the direction and in the presence of MYRA Ellington   Electronically signed: Autumn Gomez, 01-11-25 @ 07:52    I, Yuval Rico , personally performed the services described in this documentation. all medical record entries made by the autumn were at my direction and in my presence. I have reviewed the chart and agree that the record reflects my personal performance and is accurate and complete  Electronically signed: MYRA Ellington  Patient seen and examined at the bedside.    Remained critically ill on continuous ICU monitoring.    OBJECTIVE:  Vital Signs Last 24 Hrs  T(C): 36.6 (11 Jan 2025 04:00), Max: 36.6 (10 Aquilino 2025 12:00)  T(F): 97.8 (11 Jan 2025 04:00), Max: 97.9 (10 Aquilino 2025 12:00)  HR: 81 (11 Jan 2025 06:00) (74 - 105)  RR: 10 (11 Jan 2025 06:00) (8 - 26)  SpO2: 98% (11 Jan 2025 06:00) (94% - 100%)    Parameters below as of 11 Jan 2025 05:49  Patient On (Oxygen Delivery Method): nasal cannula, high flow          Physical Exam:   General: NAD, resting comfortably in the chair on HFNC   Neurology: Alert/ oriented. follows commands, moves all extremities, ambulating without issues   Respiratory: rhonchi bilaterally, diminished breath sounds on L   CV: Sinus rhythm   Abdominal: Soft, Nontender, + BMs, + abd distension, paracentesis site--cdi   Extremities: Warm, well-perfused, LUE AV fistula with + thrill                    Assessment:  54M with PMH of HTN, HLD, ESRD on HD MWF via LUE AVF, ascites (requiring repeat paracenteses q4-6wks), NICM with moderate LV dysfunction w/ EF 35-40%(2023) and CAD s/p atherectomy + SALLIE to D1(4/11/2024) presents to [facility] transferred from [facility].     s/p Cholecystectomy on 11/29/24  Multi-CAD s/p C3L on 12/30/24  Hypovolemia  Post op respiratory insufficiency  Acute blood loss anemia  Thrombocytopenia        Plan:   ***Neuro***   Trazodone at night for improved sleep   PRNs for pain management     ***Cardiovascular***  SR / MAP 52 / CVP 12/ Hct 25.8 / Lact 0.7  [x] Dobutamine 5 mcg/kg/min-->plan to wean to 3 today    Alex weaned off 1/7  [x] ASA   Heparin for AC therapy s/p cardioversion yesterday   EP consulted for postop Afib -- EDU performed yesterday (1/10) - No HERNAN thrombus/vegetations  Cardioversion initiated post EDU - EKG Normal Sinus post cardioversion    ***Pulmonary***  [x] HFNC 40%/30L   Patient with continuous collapse of L lung on CXR, bronched this AM again with mild improvement   Encourage incentive spirometry, continue pulse ox monitoring, follow ABGs   Continue bronchodilators as tolerated. Adding Vol[place] and pulm cx   WBC increase -- Performed bronchoscopy this AM   C/A/P performed (1/10) - Small bilateral pleural effusion w/ scattered consolidation throughout left lung field     ***GI***  Tube feeds @ goal -->S&S consult for diet progression   Protonix for stress ulcer prophylaxis.   Bowel regimen with Miralax and Senna    ***Renal***  hx of ESRD iHD requiring CRRT initially post op   LUE AV fistula   Nephro-Lisette for renal support  Dialysis performed today for 2L of removal via AVF , giving unit of PRBCs with HD today     ***ID***  Sepsis/septic shock due to ESBL E. coli - Continue meropenem, ongoing fevers   1/10 BAL pending  1/7 Bcx pending   1/5 Blood cultures negative to date  1/3 Bronch ESBL E Coli   1/2 Bcx ESBL E coli   Trend leukocytosis.    ***Endocrine***  [x] Stress Hyperglycemia : HbA1c 5.6%                - [x] ISS             - Need tight glycemic control to prevent wound infection.          Patient requires continuous monitoring with bedside rhythm monitoring, pulse oximetry monitoring, and continuous central venous and arterial pressure monitoring; and intermittent blood gas analysis. Care plan discussed with the ICU care team.   Patient remained critical, at risk for life threatening decompensation.    I have spent 45 minutes providing critical care management to this patient.    By signing my name below, I, Sonia Sánchez, attest that this documentation has been prepared under the direction and in the presence of MYRA Ellington   Electronically signed: Autumn Gomez, 01-11-25 @ 07:52    I, Yuval Rico , personally performed the services described in this documentation. all medical record entries made by the autumn were at my direction and in my presence. I have reviewed the chart and agree that the record reflects my personal performance and is accurate and complete  Electronically signed: MYRA Ellington

## 2025-01-12 LAB
ALBUMIN SERPL ELPH-MCNC: 3 G/DL — LOW (ref 3.3–5)
ALP SERPL-CCNC: 91 U/L — SIGNIFICANT CHANGE UP (ref 40–120)
ALT FLD-CCNC: 48 U/L — HIGH (ref 10–45)
ANION GAP SERPL CALC-SCNC: 14 MMOL/L — SIGNIFICANT CHANGE UP (ref 5–17)
APTT BLD: 37.9 SEC — HIGH (ref 24.5–35.6)
APTT BLD: 50.1 SEC — HIGH (ref 24.5–35.6)
APTT BLD: 57.1 SEC — HIGH (ref 24.5–35.6)
AST SERPL-CCNC: 24 U/L — SIGNIFICANT CHANGE UP (ref 10–40)
BASE EXCESS BLDV CALC-SCNC: 0.8 MMOL/L — SIGNIFICANT CHANGE UP (ref -2–3)
BASOPHILS # BLD AUTO: 0.08 K/UL — SIGNIFICANT CHANGE UP (ref 0–0.2)
BASOPHILS NFR BLD AUTO: 0.4 % — SIGNIFICANT CHANGE UP (ref 0–2)
BUN SERPL-MCNC: 37 MG/DL — HIGH (ref 7–23)
CALCIUM SERPL-MCNC: 8.4 MG/DL — SIGNIFICANT CHANGE UP (ref 8.4–10.5)
CHLORIDE SERPL-SCNC: 99 MMOL/L — SIGNIFICANT CHANGE UP (ref 96–108)
CO2 BLDV-SCNC: 27 MMOL/L — HIGH (ref 22–26)
CO2 SERPL-SCNC: 23 MMOL/L — SIGNIFICANT CHANGE UP (ref 22–31)
CREAT SERPL-MCNC: 3.21 MG/DL — HIGH (ref 0.5–1.3)
CULTURE RESULTS: SIGNIFICANT CHANGE UP
CULTURE RESULTS: SIGNIFICANT CHANGE UP
EGFR: 22 ML/MIN/1.73M2 — LOW
EGFR: 22 ML/MIN/1.73M2 — LOW
EOSINOPHIL # BLD AUTO: 0.56 K/UL — HIGH (ref 0–0.5)
EOSINOPHIL NFR BLD AUTO: 2.6 % — SIGNIFICANT CHANGE UP (ref 0–6)
FIBRINOGEN PPP-MCNC: 314 MG/DL — SIGNIFICANT CHANGE UP (ref 200–445)
GAS PNL BLDA: SIGNIFICANT CHANGE UP
GAS PNL BLDV: SIGNIFICANT CHANGE UP
GLUCOSE SERPL-MCNC: 142 MG/DL — HIGH (ref 70–99)
HCO3 BLDV-SCNC: 26 MMOL/L — SIGNIFICANT CHANGE UP (ref 22–29)
HGB BLD-MCNC: 8.8 G/DL — LOW (ref 13–17)
IMM GRANULOCYTES NFR BLD AUTO: 1.4 % — HIGH (ref 0–0.9)
INR BLD: 1.27 RATIO — HIGH (ref 0.85–1.16)
INR BLD: 1.27 RATIO — HIGH (ref 0.85–1.16)
INR BLD: 1.29 RATIO — HIGH (ref 0.85–1.16)
LYMPHOCYTES # BLD AUTO: 0.32 K/UL — LOW (ref 1–3.3)
LYMPHOCYTES # BLD AUTO: 1.5 % — LOW (ref 13–44)
MAGNESIUM SERPL-MCNC: 2.2 MG/DL — SIGNIFICANT CHANGE UP (ref 1.6–2.6)
MCHC RBC-ENTMCNC: 29.8 PG — SIGNIFICANT CHANGE UP (ref 27–34)
MCHC RBC-ENTMCNC: 32.1 G/DL — SIGNIFICANT CHANGE UP (ref 32–36)
MCV RBC AUTO: 92.9 FL — SIGNIFICANT CHANGE UP (ref 80–100)
MONOCYTES # BLD AUTO: 0.85 K/UL — SIGNIFICANT CHANGE UP (ref 0–0.9)
MONOCYTES NFR BLD AUTO: 3.9 % — SIGNIFICANT CHANGE UP (ref 2–14)
NEUTROPHILS # BLD AUTO: 19.56 K/UL — HIGH (ref 1.8–7.4)
NEUTROPHILS NFR BLD AUTO: 90.2 % — HIGH (ref 43–77)
NRBC # BLD: 0 /100 WBCS — SIGNIFICANT CHANGE UP (ref 0–0)
NRBC BLD-RTO: 0 /100 WBCS — SIGNIFICANT CHANGE UP (ref 0–0)
PCO2 BLDV: 43 MMHG — SIGNIFICANT CHANGE UP (ref 42–55)
PH BLDV: 7.39 — SIGNIFICANT CHANGE UP (ref 7.32–7.43)
PHOSPHATE SERPL-MCNC: 3.9 MG/DL — SIGNIFICANT CHANGE UP (ref 2.5–4.5)
PLATELET # BLD AUTO: 198 K/UL — SIGNIFICANT CHANGE UP (ref 150–400)
PO2 BLDV: 48 MMHG — HIGH (ref 25–45)
POTASSIUM SERPL-SCNC: 3.9 MMOL/L — SIGNIFICANT CHANGE UP (ref 3.5–5.3)
PROT SERPL-MCNC: 5.9 G/DL — LOW (ref 6–8.3)
PROTHROM AB SERPL-ACNC: 14.4 SEC — HIGH (ref 9.9–13.4)
PROTHROM AB SERPL-ACNC: 14.6 SEC — HIGH (ref 9.9–13.4)
PROTHROM AB SERPL-ACNC: 14.7 SEC — HIGH (ref 9.9–13.4)
RBC # BLD: 2.95 M/UL — LOW (ref 4.2–5.8)
RBC # FLD: 21.2 % — HIGH (ref 10.3–14.5)
SAO2 % BLDV: 84.3 % — SIGNIFICANT CHANGE UP (ref 67–88)
SODIUM SERPL-SCNC: 136 MMOL/L — SIGNIFICANT CHANGE UP (ref 135–145)
SPECIMEN SOURCE: SIGNIFICANT CHANGE UP
SPECIMEN SOURCE: SIGNIFICANT CHANGE UP
WBC # BLD: 21.67 K/UL — HIGH (ref 3.8–10.5)
WBC # FLD AUTO: 21.67 K/UL — HIGH (ref 3.8–10.5)

## 2025-01-12 PROCEDURE — 99291 CRITICAL CARE FIRST HOUR: CPT

## 2025-01-12 PROCEDURE — 31645 BRNCHSC W/THER ASPIR 1ST: CPT

## 2025-01-12 PROCEDURE — 71045 X-RAY EXAM CHEST 1 VIEW: CPT | Mod: 26

## 2025-01-12 RX ORDER — KETAMINE HCL IN 0.9 % NACL 50 MG/5 ML
60 SYRINGE (ML) INTRAVENOUS ONCE
Refills: 0 | Status: DISCONTINUED | OUTPATIENT
Start: 2025-01-12 | End: 2025-01-12

## 2025-01-12 RX ORDER — LIDOCAINE HCL/PF 10 MG/ML
8 VIAL (ML) INJECTION ONCE
Refills: 0 | Status: COMPLETED | OUTPATIENT
Start: 2025-01-12 | End: 2025-01-12

## 2025-01-12 RX ORDER — DEXAMETHASONE 0.5 MG/1
6 TABLET ORAL EVERY 6 HOURS
Refills: 0 | Status: COMPLETED | OUTPATIENT
Start: 2025-01-12 | End: 2025-01-13

## 2025-01-12 RX ORDER — LIDOCAINE HCL/PF 10 MG/ML
10 VIAL (ML) INJECTION ONCE
Refills: 0 | Status: COMPLETED | OUTPATIENT
Start: 2025-01-12 | End: 2025-01-12

## 2025-01-12 RX ORDER — CALCIUM GLUCONATE 20 MG/ML
1 INJECTION, SOLUTION INTRAVENOUS ONCE
Refills: 0 | Status: COMPLETED | OUTPATIENT
Start: 2025-01-12 | End: 2025-01-12

## 2025-01-12 RX ORDER — DEXMEDETOMIDINE HYDROCHLORIDE IN SODIUM CHLORIDE 4 UG/ML
1 INJECTION INTRAVENOUS
Qty: 200 | Refills: 0 | Status: DISCONTINUED | OUTPATIENT
Start: 2025-01-12 | End: 2025-01-12

## 2025-01-12 RX ORDER — ACETAMINOPHEN 500 MG/5ML
1000 LIQUID (ML) ORAL ONCE
Refills: 0 | Status: COMPLETED | OUTPATIENT
Start: 2025-01-12 | End: 2025-01-12

## 2025-01-12 RX ORDER — VANCOMYCIN HCL IN 5 % DEXTROSE 1.5G/250ML
125 PLASTIC BAG, INJECTION (ML) INTRAVENOUS EVERY 12 HOURS
Refills: 0 | Status: DISCONTINUED | OUTPATIENT
Start: 2025-01-12 | End: 2025-01-19

## 2025-01-12 RX ADMIN — Medication 400 MILLIGRAM(S): at 01:32

## 2025-01-12 RX ADMIN — Medication 10 MILLILITER(S): at 19:24

## 2025-01-12 RX ADMIN — Medication 1 APPLICATION(S): at 04:02

## 2025-01-12 RX ADMIN — Medication 60 MILLIGRAM(S): at 11:32

## 2025-01-12 RX ADMIN — Medication 4 MILLILITER(S): at 11:54

## 2025-01-12 RX ADMIN — Medication 4 MILLILITER(S): at 00:01

## 2025-01-12 RX ADMIN — ALBUMIN (HUMAN) 50 MILLILITER(S): 12.5 INJECTION, SOLUTION INTRAVENOUS at 01:14

## 2025-01-12 RX ADMIN — IPRATROPIUM BROMIDE AND ALBUTEROL SULFATE 3 MILLILITER(S): .5; 2.5 SOLUTION RESPIRATORY (INHALATION) at 17:09

## 2025-01-12 RX ADMIN — Medication 100 MILLIGRAM(S): at 21:03

## 2025-01-12 RX ADMIN — LIDOCAINE HYDROCHLORIDE 2 PATCH: 20 JELLY TOPICAL at 23:32

## 2025-01-12 RX ADMIN — DEXAMETHASONE 6 MILLIGRAM(S): 0.5 TABLET ORAL at 16:53

## 2025-01-12 RX ADMIN — Medication 1 TABLET(S): at 11:49

## 2025-01-12 RX ADMIN — AMIODARONE HYDROCHLORIDE 400 MILLIGRAM(S): 50 INJECTION, SOLUTION INTRAVENOUS at 21:03

## 2025-01-12 RX ADMIN — CALCIUM GLUCONATE 100 GRAM(S): 20 INJECTION, SOLUTION INTRAVENOUS at 16:54

## 2025-01-12 RX ADMIN — Medication 40 MILLIGRAM(S): at 11:48

## 2025-01-12 RX ADMIN — LIDOCAINE HYDROCHLORIDE 2 PATCH: 20 JELLY TOPICAL at 19:54

## 2025-01-12 RX ADMIN — IPRATROPIUM BROMIDE AND ALBUTEROL SULFATE 3 MILLILITER(S): .5; 2.5 SOLUTION RESPIRATORY (INHALATION) at 05:30

## 2025-01-12 RX ADMIN — Medication 4 MILLILITER(S): at 17:10

## 2025-01-12 RX ADMIN — HEPARIN SODIUM 13 UNIT(S)/HR: 1000 INJECTION INTRAVENOUS; SUBCUTANEOUS at 01:10

## 2025-01-12 RX ADMIN — Medication 4 MILLILITER(S): at 05:30

## 2025-01-12 RX ADMIN — AMIODARONE HYDROCHLORIDE 400 MILLIGRAM(S): 50 INJECTION, SOLUTION INTRAVENOUS at 13:04

## 2025-01-12 RX ADMIN — AMIODARONE HYDROCHLORIDE 400 MILLIGRAM(S): 50 INJECTION, SOLUTION INTRAVENOUS at 05:38

## 2025-01-12 RX ADMIN — GABAPENTIN 100 MILLIGRAM(S): 400 CAPSULE ORAL at 17:04

## 2025-01-12 RX ADMIN — DEXAMETHASONE 6 MILLIGRAM(S): 0.5 TABLET ORAL at 23:41

## 2025-01-12 RX ADMIN — TOBRAMYCIN 300 MILLIGRAM(S): 300 SOLUTION RESPIRATORY (INHALATION) at 05:30

## 2025-01-12 RX ADMIN — Medication 10 MILLILITER(S): at 11:33

## 2025-01-12 RX ADMIN — INSULIN LISPRO 2: 100 INJECTION, SOLUTION INTRAVENOUS; SUBCUTANEOUS at 05:48

## 2025-01-12 RX ADMIN — IPRATROPIUM BROMIDE AND ALBUTEROL SULFATE 3 MILLILITER(S): .5; 2.5 SOLUTION RESPIRATORY (INHALATION) at 11:57

## 2025-01-12 RX ADMIN — DOBUTAMINE 3.83 MICROGRAM(S)/KG/MIN: 250 INJECTION INTRAVENOUS at 19:24

## 2025-01-12 RX ADMIN — Medication 1000 MILLIGRAM(S): at 01:47

## 2025-01-12 RX ADMIN — GABAPENTIN 100 MILLIGRAM(S): 400 CAPSULE ORAL at 05:39

## 2025-01-12 RX ADMIN — Medication 81 MILLIGRAM(S): at 11:49

## 2025-01-12 RX ADMIN — LIDOCAINE HYDROCHLORIDE 2 PATCH: 20 JELLY TOPICAL at 11:48

## 2025-01-12 RX ADMIN — TOBRAMYCIN 300 MILLIGRAM(S): 300 SOLUTION RESPIRATORY (INHALATION) at 17:10

## 2025-01-12 RX ADMIN — Medication 8 MILLILITER(S): at 11:45

## 2025-01-12 RX ADMIN — MEROPENEM 100 MILLIGRAM(S): 1 INJECTION INTRAVENOUS at 21:03

## 2025-01-12 RX ADMIN — Medication 125 MILLIGRAM(S): at 17:04

## 2025-01-12 RX ADMIN — Medication 1 APPLICATION(S): at 21:10

## 2025-01-12 RX ADMIN — Medication 4 MILLILITER(S): at 23:16

## 2025-01-12 RX ADMIN — HEPARIN SODIUM 14 UNIT(S)/HR: 1000 INJECTION INTRAVENOUS; SUBCUTANEOUS at 19:24

## 2025-01-12 RX ADMIN — IPRATROPIUM BROMIDE AND ALBUTEROL SULFATE 3 MILLILITER(S): .5; 2.5 SOLUTION RESPIRATORY (INHALATION) at 23:15

## 2025-01-12 NOTE — PROGRESS NOTE ADULT - ASSESSMENT
54M with PMH of HTN, HLD, ESRD on HD MWF via LUE AVF, ascites (requiring repeat paracenteses q4-6wks), NICM with moderate LV dysfunction w/ EF 35-40%(2023) and CAD s/p atherectomy + SALLIE to D1(4/11/2024) presents to [facility] transferred from [facility] for CABG eval  Nephro on [facility] for HD    ESRD on HD   Regular schedule MWF   Access LUE AVF  [facility]  Nephrologist Dr. Bailey  Last HD on 12/24  S/p HD on 12/27 12/29, 12/30 for hyperkalemia and 12/31  2 L PUF On 01/02/2024  However hospital course now complicated by Cardiogenic shock now on multiple pressors,   CRRT initiated 01/03, off since 01/08   S/p PUF on 01/09   HD held on 01/10 due to instability,  S/p HD on 01/11 2 L UF rmeoved  Will plan for HD tomm if patient is HDS, versus CRRT for volume removal  Keep MAP>65  Renal Diet  Consent in physical chart    Hyper/Hypokalemia  Monitor K, will change dialysate fluid as needed    HTN  currently on pressure support  monitor bp     CKD MBD  Can send a PTH  Ca and Phos daily    Anemia  CRISTIANE with HD  Transfuse if hgb <7    CAD with multivessel disease  s/p CABG 12/30  CTICU following

## 2025-01-12 NOTE — PROGRESS NOTE ADULT - ASSESSMENT
54M with PMH of HTN, HLD, ESRD on HD MWF via LUE AVF, ascites (requiring repeat paracenteses q4-6wks), NICM with moderate LV dysfunction w/ EF 35-40%() and CAD s/p atherectomy + SALLIE to D1(2024) presents to [facility] transferred from [facility]. Patient initially presented to [facility] ED with shortness of breath. Of note he was recently admitted from - for acute cholecystitis s/p cholecystectomy. In [facility] ED, pt was hypoxic to 86% on RA, trop 1.7K, EKG no new changes, CXR fluid overload. Admitted for AHRF 2/2 fluid overload. Pt received HD on , ,  and . DAPT was resumed, TTE showed improvement in LVEF to 40-45%. Stress test was abnormal with 1mm inferior STD, reversible ischemia in the inferior wall and fixed defects in the lateral, anterior and apical walls with post stress EF of 24%.     Pt was then transferred to [facility] for cardiac cath which showed pLAD 70% ISR, mLCx 70%, D1 severe dz.     Patient now transferred to [facility] CTS Dr. Rivers for CABG eval.      # CAD s/p NSTEMI  Hx CAD s/p PCI LAD   Presented with ADHF elevated troponins  Positive NST1-Cath- pLAD 70% ISR, mLCx 70%, D1 severe dz.   TTE EF 35% Severe TRWas on Plavix-p2y12- 321 been off Plavix since -POD#1-C3L free LIMA-LAD; SVG-Diag; ZME-vsjmQT-Tbsgljrlo on  Sinus rhythm,Amio for AF prophylaxis  -OOB off  Sinus rhythm   -POD#3-On /pressors-EF 25-30% RV failure with transaminitis-Sinus Ynwzkt85/03-POD#4-Was intubated for hypoxia-gradual hypotension on /pressors had IABP inserted this morning  -POD#5-s/p IABP insertion, sepsis/septic shock due to GNR/ECOLI HD cath placed   Bronched yesterday 1/3 - minimal secretions with rust-tinged sputum     ESBL-E Coli bacteremia on meropenam    - POD#7 Atrial Fib ,remains intubated weaning IABP 1:3,off pressors on CRRT  -IABP removed.Remains on vent-Alex 10ppm,off Levo- CVP 10 / MAP 71 / Hct 25.6% / Lactate 1.7-Atrial Fibrillation  -Intubated on Alex 10ppm, gtt, BP better-AF~~90's on Amio-had bronch still febrile Tmax 101  -Extubated awake alert on HFNC AF,on Dobutrex-CRRT,Cultures no growth    01/10-OOB on BiPAP Sinus Rhythm on -SR/ MAP 57/ CVP 10/  Hct 26.7 / Lact 1.4  -s/p EDU/DCCV-Sinus rhythm on -SR / MAP 52 / CVP 12/ Hct 25.8 / Lact 0.7-had bronch with BAL Left lung  -Sinus rhythm on -for repeat Bronch-SR / MAP 67 / CVP 11 / Hct 27.4% / Lact 0.8          # Acute on Chronic Systolic Heart Failure  EF-35% ,severe TR  Had HD post op  GDMT post op  LVEF improved post bypass but now at 23-30%-BiV dysfunction on  now off pressors  -TTE EF 40%,trace effusion  ECG c/w pericarditis-on colchicine     Vascular evaluated  AVGraft /?steal causing RV failure-'' Very low suspicion of steal Sd and AVF causing current clinical state. ''   VA Duplex Hemodialysis Access, Left (25 @ 13:35) >   Arterial flow volume of 1301 mL/m, and the venous flow volume of  1492 mL/m are consistent with a normally functioning dialysis access  fistula.        # Ascites  Hx chronic ascites  Has drainage q4-6 weeks  Last drained -4600mL  ? ascites related to severe TR  Had tqfbxewfzchn-Kzxqgxj-cz growth   CT Abdomen 01/10-Large volume ascites-consider paracentesis  Monitor      # ESRD  Now off CRRT transition to HD

## 2025-01-12 NOTE — PROCEDURE NOTE - NSBRONCHPROCDETAILS_GEN_A_CORE_FT
The bronchoscope was inserted via the mouth and advanced passed the vocal cords down to the tracheobronchial tree. Secretions were therapeutically aspirated.

## 2025-01-12 NOTE — PROGRESS NOTE ADULT - SUBJECTIVE AND OBJECTIVE BOX
MR#[id number]  PATIENT NAME:RAAD CANO    DATE OF SERVICE: 25 @ 09:14  Patient was seen and examined by Solo Quick MD on    25 @ 09:14 .  Interim events noted.Consultant notes ,Labs,Telemetry reviewed by me       HOSPITAL COURSE: HPI:  54M with PMH of HTN, HLD, ESRD on HD MWF via LUE AVF, ascites (requiring repeat paracenteses q4-6wks), NICM with moderate LV dysfunction w/ EF 35-40%() and CAD s/p atherectomy + SALLIE to D1(2024) presents to [facility] transferred from [facility]. Patient initially presented to [facility] ED with shortness of breath. Of note he was recently admitted from - for acute cholecystitis s/p cholecystectomy. In [facility] ED, pt was hypoxic to 86% on RA, trop 1.7K, EKG no new changes, CXR fluid overload. Admitted for AHRF 2/2 fluid overload. Pt received HD on , ,  and . DAPT was resumed, TTE showed improvement in LVEF to 40-45%. Stress test was abnormal with 1mm inferior STD, reversible ischemia in the inferior wall and fixed defects in the lateral, anterior and apical walls with post stress EF of 24%. Pt was then transferred to [facility] for cardiac cath which showed pLAD 70% ISR, mLCx 70%, D1 severe dz. Patient now transferred to [facility] CTS Dr. Rivers for CABG eval. Patient denies any current SOB or chest pain on admission. Otherwise denies headaches, abdominal pain, urinary or bowel changes, fevers, chills, N/V/D, sick contacts.  (24 Dec 2024 05:07)        INTERIM EVENTS:Patient seen at bedside ,interim events noted.   Cardiac cath  pLAD 70% ISR, mLCx 70%, D1 severe dz.   -POD#1-C3L free LIMA-LAD; SVG-Diag; SVG-leftPL Awake OOB extubated Sinus rhythm on Dobutamine gtt  - Was intubated last night for airway protection s/p bronchoscopy This morning had IABP inserted  remains sedated intubated Sinus Rhythm  - s/p IABP insertion, sepsis/septic shock due to GNR/ECOLI -Paracentesis for suspected bacterial peritonitis  Transfused 1u pbrcs overnight  Remains on inoptropes and pressors with IABP at 1:1  -Intunated on IABP 1:1,Alex@20ppm,on ,weaning pressors  -POD#7-Remains intubated on CRRT-IABP 1:3,off pressors on Dobutrex in Atrial Fibrillation  -Intibated on Alex 10ppm,IABP dce'd,off Levophed,remains on Dobutrex-Atrial Fibrillation  -Remains intubated had bronch earlier for cupious secretions-On Dobutrex,AF~~90's On CRRT  -Extubated on HFNC,no dyspnea alert remains in AF off CRRT  01/10-OOB Awake  on BiPAP +,  -Was cardioverted yesterday remains in Sinus rhythm-had Bronch earlier L lung mucus-on  On HFNC-40%/30L Trial HD  -OOB remains on HFNC for repeat bronch today-Sinus rhythm On /Vasopressin gtt         MEDICATIONS  (STANDING):  albuterol/ipratropium for Nebulization 3 milliLiter(s) Nebulizer every 6 hours  aMIOdarone    Tablet   Oral   aMIOdarone    Tablet 400 milliGRAM(s) Oral every 8 hours  aspirin  chewable 81 milliGRAM(s) Oral daily  bisacodyl Suppository 10 milliGRAM(s) Rectal once  chlorhexidine 2% Cloths 1 Application(s) Topical daily  chlorhexidine 4% Liquid 1 Application(s) Topical <User Schedule>  dexMEDEtomidine Infusion 1 MICROgram(s)/kG/Hr (21.3 mL/Hr) IV Continuous <Continuous>  dextrose 50% Injectable 50 milliLiter(s) IV Push every 15 minutes  dextrose 50% Injectable 25 milliLiter(s) IV Push every 15 minutes  DOBUTamine Infusion 3 MICROgram(s)/kG/Min (3.83 mL/Hr) IV Continuous <Continuous>  epoetin ignacia (EPOGEN) Injectable 96114 Unit(s) IV Push <User Schedule>  gabapentin 100 milliGRAM(s) Oral every 12 hours  heparin  Infusion 300 Unit(s)/Hr (14 mL/Hr) IV Continuous <Continuous>  insulin lispro (ADMELOG) corrective regimen sliding scale   SubCutaneous every 6 hours  ketamine Injectable 60 milliGRAM(s) IV Push once  lidocaine   4% Patch 2 Patch Transdermal daily  lidocaine 1% Injectable 10 milliLiter(s) Local Injection once  lidocaine 2% Injection for Nebulization 8 milliLiter(s) Nebulizer once  meropenem  IVPB 500 milliGRAM(s) IV Intermittent every 24 hours  Nephro-elicia 1 Tablet(s) Oral daily  pantoprazole  Injectable 40 milliGRAM(s) IV Push daily  polyethylene glycol 3350 17 Gram(s) Oral two times a day  senna 2 Tablet(s) Oral at bedtime  sodium chloride 0.9%. 1000 milliLiter(s) (10 mL/Hr) IV Continuous <Continuous>  sodium chloride 3%  Inhalation 4 milliLiter(s) Inhalation every 6 hours  tobramycin for Nebulization 300 milliGRAM(s) Inhalation every 12 hours  traZODone 100 milliGRAM(s) Oral at bedtime  vasopressin Infusion 0.03 Unit(s)/Min (4.5 mL/Hr) IV Continuous <Continuous>    MEDICATIONS  (PRN):  lidocaine 2% Gel 1 Application(s) Topical daily PRN pre HD  midodrine 10 milliGRAM(s) Oral every 8 hours PRN pre-HD  sodium chloride 0.9% lock flush 10 milliLiter(s) IV Push every 1 hour PRN Pre/post blood products, medications, blood draw, and to maintain line patency            REVIEW OF SYSTEMS:  Constitutional: [ ] fever, [ ]weight loss,  [ ]fatigue [ ]weight gain  Eyes: [ ] visual changes  Respiratory: [ ]shortness of breath;  [ ] cough, [ ]wheezing, [ ]chills, [ ]hemoptysis  Cardiovascular: [ ] chest pain, [ ]palpitations, [ ]dizziness,  [ ]leg swelling[ ]orthopnea[ ]PND  Gastrointestinal: [ ] abdominal pain, [ ]nausea, [ ]vomiting,  [ ]diarrhea [ ]Constipation [ ]Melena  Genitourinary: [ ] dysuria, [ ] hematuria [ ]Vigil  Neurologic: [ ] headaches [ ] tremors[ ]weakness [ ]Paralysis Right[ ] Left[ ]  Skin: [ ] itching, [ ]burning, [ ] rashes  Endocrine: [ ] heat or cold intolerance  Musculoskeletal: [ ] joint pain or swelling; [ ] muscle, back, or extremity pain  Psychiatric: [ ] depression, [ ]anxiety, [ ]mood swings, or [ ]difficulty sleeping  Hematologic: [ ] easy bruising, [ ] bleeding gums    [ ] All remaining systems negative except as per above.   [ ]Unable to obtain.  [x] No change in ROS since admission      Vital Signs Last 24 Hrs  T(C): 37.2 (2025 08:00), Max: 37.2 (2025 08:00)  T(F): 98.9 (2025 08:00), Max: 98.9 (2025 08:00)  HR: 91 (2025 09:00) (79 - 91)  BP: 111/61 (2025 08:00) (101/54 - 136/63)  BP(mean): 80 (:) (73 - 93)  RR: 26 (:00) (13 - 37)  SpO2: 100% (:00) (100% - 100%)    Parameters below as of 2025 08:00  Patient On (Oxygen Delivery Method): nasal cannula, high flow  O2 Flow (L/min): 30  O2 Concentration (%): 0        I&O's Summary    2025 07:01  -  2025 07:00  --------------------------------------------------------  IN: 1674 mL / OUT: 3000 mL / NET: -1326 mL    2025 07:01  -  2025 09:14  --------------------------------------------------------  IN: 31.6 mL / OUT: 0 mL / NET: 31.6 mL        PHYSICAL EXAM:  General: No acute distress BMI-29  HEENT: EOMI, PERRL  Neck: Supple, [ ] JVD  LungsFair  air entry bilaterally; [ ] rales [ ] wheezing [ x] rhonchi  Heart: Regular rate and rhythm; [x ] murmur   2/6 [ x] systolic [ ] diastolic [ ] radiation[ ] rubs [ ]  gallops  Abdomen: Nontender, bowel sounds present  Extremities: No clubbing, cyanosis, [ ] edema [ ]Pulses  equal and intact  Nervous system:  Alert & Oriented X3, no focal deficits  Psychiatric: Normal affect  Skin: No rashes or lesions    LABS:      136  |  99  |  37[H]  ----------------------------<  142[H]  3.9   |  23  |  3.21[H]    Ca    8.4      2025 00:30  Phos  3.9       Mg     2.2         TPro  5.9[L]  /  Alb  3.0[L]  /  TBili  1.3[H]  /  DBili  x   /  AST  24  /  ALT  48[H]  /  AlkPhos  91      Creatinine Trend: 3.21<--, 4.10<--, 2.98<--, 1.99<--, 2.00<--, 2.17<--                        8.8    21.67 )-----------( 198      ( 2025 00:30 )             27.4     PT/INR - ( 2025 08:27 )   PT: 14.7 sec;   INR: 1.29 ratio         PTT - ( 2025 08:27 )  PTT:50.1 sec       Xray Chest 1 View- PORTABLE-Urgent (Xray Chest 1 View- PORTABLE-Urgent .) (25 @ 15:21) >  FINDINGS/  IMPRESSION: There is a left IJV catheter the tip unchanged located in the  region of the left brachiocephalic vein. There is a feeding tube the tip   coiled back upon itself in the region the gastric fundus.   Persistent  pulmonary venous congestive changes with a large left-sided pleural  effusion       12 Lead ECG (01.10.25 @ 12:30) >  SINUS RHYTHM WITH 1ST DEGREE A-V BLOCK  LEFT AXIS DEVIATION  NON-SPECIFIC INTRA-VENTRICULAR CONDUCTION BLOCK  MINIMAL VOLTAGE CRITERIA FOR LVH, MAY BE NORMAL VARIANT ( [employer] product )  ABNORMAL ECG       EDU W or WO Ultrasound Enhancing Agent (01.10.25 @ 11:57) >  CONCLUSIONS:      1. Bedside Limited EDU in CTU. No gastric Images obtained.   2. No evidence of left atrial or left atrial appendage thrombus.   3. Left ventricular systolic function is severely decreased.   4. Enlarged right ventricular cavity size and reduced right ventricular systolic function.   5. Tricuspid annular dilation. Moderate tricuspid regurgitation.   6. No obvious echocardiographic evidence of vegetations.          CT Abdomen and Pelvis w/ IV Cont (01.10.25 @ 15:41) >    IMPRESSION: Scattered consolidation in theleft lung most severe in the  lower lobe.    Nonocclusive thrombus right internal jugular vein.    Large volume ascites.

## 2025-01-12 NOTE — PROCEDURE NOTE - NSICDXPROCEDURE_GEN_ALL_CORE_FT
PROCEDURES:  Bronchoscopy, initial, with therapeutic aspiration 12-Jan-2025 12:32:49  Dudley Patino

## 2025-01-12 NOTE — PROCEDURE NOTE - NSBRONCHFINDINGS_GEN_A_CORE_FT
Vocal cords appear edematous  Abena is sharp  Copious thick, green-colored secretions found within the right mainstem bronchus  Secretions were cleared and the distal left airway was examined and found to be patent  Minimal secretions present in the right proximal airway.  The bronchoscope was then withdrawn.  Patient tolerated procedure.

## 2025-01-12 NOTE — PROGRESS NOTE ADULT - SUBJECTIVE AND OBJECTIVE BOX
Patient seen and examined at the bedside.    Remained critically ill on continuous ICU monitoring.    OBJECTIVE:  Vital Signs Last 24 Hrs  T(C): 36.9 (12 Jan 2025 04:00), Max: 37.1 (11 Jan 2025 20:00)  T(F): 98.4 (12 Jan 2025 04:00), Max: 98.8 (11 Jan 2025 20:00)  HR: 86 (12 Jan 2025 07:00) (79 - 89)  BP: 111/60 (12 Jan 2025 07:00) (101/54 - 136/63)  BP(mean): 78 (12 Jan 2025 07:00) (73 - 93)  RR: 17 (12 Jan 2025 07:00) (12 - 37)  SpO2: 100% (12 Jan 2025 07:00) (100% - 100%)    Parameters below as of 12 Jan 2025 05:46  Patient On (Oxygen Delivery Method): nasal cannula, high flow          Physical Exam:   General: NAD, resting comfortably in the chair on HFNC   Neurology: Alert/ oriented. follows commands, moves all extremities, ambulating without issues   Respiratory: rhonchi bilaterally, diminished breath sounds on L   CV: Sinus rhythm   Abdominal: Soft, Nontender, + BMs, + abd distension, paracentesis site--clean/dry/intact   Extremities: Warm, well-perfused, LUE AV fistula with + thrill                  Assessment:  54M with PMH of HTN, HLD, ESRD on HD MWF via LUE AVF, ascites (requiring repeat paracenteses q4-6wks), NICM with moderate LV dysfunction w/ EF 35-40%(2023) and CAD s/p atherectomy + SALLIE to D1(4/11/2024) presents to [facility] transferred from [facility].     s/p Cholecystectomy on 11/29/24  Multi-CAD s/p C3L on 12/30/24  Hypovolemia  Post op respiratory insufficiency  Acute blood loss anemia  Leukocytosis      Plan:   ***Neuro***   Trazodone at night for improved sleep   PRNs for pain management     ***Cardiovascular***  SR / MAP 67 / CVP 11 / Hct 27.4% / Lact 0.8  [x] Dobutamine 5 mcg/kg/min weaned to 3 mcg/kg/min  [x] Vasopressin 0.03 units/min   Alex weaned off 1/7  EP consulted for postop Afib -- EDU performed yesterday (1/10) - No HERNAN thrombus/vegetations  Cardioversion initiated post EDU - EKG Normal Sinus post cardioversion, Amiodarone for afib prophylaxis post DCCV   [x] ASA   Heparin for AC therapy s/p cardioversion 1/10  EP consulted for postop Afib -- EDU performed yesterday (1/10) - No HERNAN thrombus/vegetations      ***Pulmonary***  [x] HFNC 30%/40L   Patient with continuous collapse of L lung on CXR, bronched this AM again with mild improvement   Encourage incentive spirometry, continue pulse ox monitoring, follow ABGs   Continue bronchodilators as tolerated. Adding Volera and pulm cx   Plan for bronchoscopy tomorrow (1/12)  C/A/P performed (1/10) - Small bilateral pleural effusion w/ scattered consolidation throughout left lung field     ***GI***  NPO at MN for Bronch procedure today  Tube feeds @ goal -->S&S consult for diet progression   Protonix for stress ulcer prophylaxis.   Bowel regimen with Miralax and Senna    ***Renal***  hx of ESRD iHD requiring CRRT initially post op   LUE AV fistula   Nephro-Lisette for renal support  Dialysis performed today for 2L of removal via AVF , giving unit of PRBCs with HD today   Paracentesis performed today - Removed 1L    ***ID***  Sepsis/septic shock due to ESBL E. coli - Continue meropenem, ongoing fevers   Mitch Nebs for Respiratory infection  1/10 BAL pending  1/7 Bcx pending   1/5 Blood cultures negative to date  1/3 Bronch ESBL E Coli   1/2 Bcx ESBL E coli   Trend leukocytosis.    ***Endocrine***  [x] Stress Hyperglycemia : HbA1c 5.6%                - [x] ISS             - Need tight glycemic control to prevent wound infection.        Patient requires continuous monitoring with bedside rhythm monitoring, pulse oximetry monitoring, and continuous central venous and arterial pressure monitoring; and intermittent blood gas analysis. Care plan discussed with the ICU care team.   Patient remained critical, at risk for life threatening decompensation.    I have spent 30 minutes providing critical care management to this patient.    By signing my name below, I, Sonia Sánchez, attest that this documentation has been prepared under the direction and in the presence of MYRA Madrigal.   Electronically signed: Autumn Gomez, 01-12-25 @ 07:35    I, Gerald Matthews , personally performed the services described in this documentation. all medical record entries made by the autumn were at my direction and in my presence. I have reviewed the chart and agree that the record reflects my personal performance and is accurate and complete  Electronically signed: Gerald RENTERIA.  Patient seen and examined at the bedside.    Remained critically ill on continuous ICU monitoring.    OBJECTIVE:  Vital Signs Last 24 Hrs  T(C): 36.9 (12 Jan 2025 04:00), Max: 37.1 (11 Jan 2025 20:00)  T(F): 98.4 (12 Jan 2025 04:00), Max: 98.8 (11 Jan 2025 20:00)  HR: 86 (12 Jan 2025 07:00) (79 - 89)  BP: 111/60 (12 Jan 2025 07:00) (101/54 - 136/63)  BP(mean): 78 (12 Jan 2025 07:00) (73 - 93)  RR: 17 (12 Jan 2025 07:00) (12 - 37)  SpO2: 100% (12 Jan 2025 07:00) (100% - 100%)    Parameters below as of 12 Jan 2025 05:46  Patient On (Oxygen Delivery Method): nasal cannula, high flow 30/40%      Physical Exam:   General: NAD, resting comfortably in the chair on HFNC   Neurology: Alert/ oriented. follows commands, moves all extremities, ambulating without issues   Respiratory: rhonchi bilaterally, diminished breath sounds on L side  CV: NSR  Abdominal: Soft, Nontender, + BMs, + abd distension  Extremities: Warm, well-perfused, LUE AV fistula with + thrill                  Assessment:  54M with PMH of HTN, HLD, ESRD on HD MWF via LUE AVF, ascites (requiring repeat paracenteses q4-6wks), NICM with moderate LV dysfunction w/ EF 35-40%(2023) and CAD s/p atherectomy + SALLIE to D1(4/11/2024) presents to [facility] transferred from [facility].     s/p Cholecystectomy on 11/29/24  Multi-CAD s/p C3L on 12/30/24  Hypovolemia  Post op respiratory insufficiency  Acute blood loss anemia  Leukocytosis      Plan:   ***Neuro***   Trazodone at night added for improved sleep   PRNs for pain management     ***Cardiovascular***  SR / MAP 67 / CVP 11 / Hct 27.4% / Lact 0.8  [x] Dobutamine 5 mcg/kg/min weaned to 2 mcg/kg/min  Alex weaned off 1/7  EP consulted for postop Afib -- EDU performed yesterday (1/10) - No HERNAN thrombus/vegetations  Cardioversion initiated post EDU - EKG Normal Sinus post cardioversion, Amiodarone for afib prophylaxis post DCCV   Heparin for AC therapy s/p cardioversion 1/10 - held this AM for bronch   ASA    ***Pulmonary***  [x] HFNC 30%/40L   [x] BiPAP 5/5 11p-4a  Patient with continuous collapse of L lung on CXR, bronched this AM again with mild improvement   Encourage incentive spirometry, continue pulse ox monitoring, follow ABGs   3% saline nebs/duonebs/volera    C/A/P performed (1/10) - Small bilateral pleural effusion w/ scattered consolidation throughout left lung field     ***GI***  Tube feeds restarted post bronch --> plan for FEES 1/13  Protonix for stress ulcer prophylaxis.   Bowel regimen discontinued i/s/o multiple bowel movements today     ***Renal***  hx of ESRD iHD requiring CRRT initially post op   LUE AV fistula   Nephro-Lisette for renal support  Dialysis performed yesterday for 2L of removal via AVF, next HD 1/13    ***ID***  Sepsis/septic shock due to ESBL E. coli - Continue meropenem, ongoing fevers   Mitch Nebs for Respiratory infection  PO Vanco for C.diff prophylaxis   1/10 BAL pending  1/7 Bcx pending   1/5 Blood cultures negative to date  1/3 Bronch ESBL E Coli   1/2 Bcx ESBL E coli   Trend leukocytosis.    ***Endocrine***  [x] Stress Hyperglycemia : HbA1c 5.6%                - [x] ISS             - Need tight glycemic control to prevent wound infection.        Patient requires continuous monitoring with bedside rhythm monitoring, pulse oximetry monitoring, and continuous central venous and arterial pressure monitoring; and intermittent blood gas analysis. Care plan discussed with the ICU care team.   Patient remained critical, at risk for life threatening decompensation.    I have spent 30 minutes providing critical care management to this patient.    By signing my name below, I, Sonia Sánchez, attest that this documentation has been prepared under the direction and in the presence of MYRA Madrigal.   Electronically signed: Evelio Gomez, 01-12-25 @ 07:35    TOSIN, Gerald Matthews , personally performed the services described in this documentation. all medical record entries made by the scribe were at my direction and in my presence. I have reviewed the chart and agree that the record reflects my personal performance and is accurate and complete  Electronically signed: Gerald RENTERIA.

## 2025-01-12 NOTE — PROGRESS NOTE ADULT - SUBJECTIVE AND OBJECTIVE BOX
[facility]    Dr. Nica Styles     Office [phone] (9 am to 5 pm)  Service: [phone] (5pm to 9am)  Also Available on TEAMS      RENAL PROGRESS NOTE: DATE OF SERVICE 01-12-25 @ 10:40    Patient is a 54y old  Male who presents with a chief complaint of CAD s/p CABG (12 Jan 2025 09:14)      Patient seen and examined at bedside. No chest pain/sob    VITALS:  T(F): 98.9 (01-12-25 @ 08:00), Max: 98.9 (01-12-25 @ 08:00)  HR: 76 (01-12-25 @ 10:00)  BP: 111/61 (01-12-25 @ 08:00)  RR: 16 (01-12-25 @ 10:00)  SpO2: 98% (01-12-25 @ 10:00)  Wt(kg): --    01-11 @ 07:01 - 01-12 @ 07:00  --------------------------------------------------------  IN: 1674 mL / OUT: 3000 mL / NET: -1326 mL    01-12 @ 07:01  - 01-12 @ 10:40  --------------------------------------------------------  IN: 61.7 mL / OUT: 0 mL / NET: 61.7 mL          PHYSICAL EXAM:  Constitutional: NAD  Neck: No JVD  Respiratory: CTAB, no wheezes, rales or rhonchi  Cardiovascular: S1, S2, RRR  Gastrointestinal: BS+, soft, NT/ND  Extremities: No peripheral edema    Hospital Medications:   MEDICATIONS  (STANDING):  albuterol/ipratropium for Nebulization 3 milliLiter(s) Nebulizer every 6 hours  aMIOdarone    Tablet   Oral   aMIOdarone    Tablet 400 milliGRAM(s) Oral every 8 hours  aspirin  chewable 81 milliGRAM(s) Oral daily  bisacodyl Suppository 10 milliGRAM(s) Rectal once  chlorhexidine 2% Cloths 1 Application(s) Topical daily  chlorhexidine 4% Liquid 1 Application(s) Topical <User Schedule>  dexMEDEtomidine Infusion 1 MICROgram(s)/kG/Hr (21.3 mL/Hr) IV Continuous <Continuous>  dextrose 50% Injectable 50 milliLiter(s) IV Push every 15 minutes  dextrose 50% Injectable 25 milliLiter(s) IV Push every 15 minutes  DOBUTamine Infusion 3 MICROgram(s)/kG/Min (3.83 mL/Hr) IV Continuous <Continuous>  epoetin ignacia (EPOGEN) Injectable 99311 Unit(s) IV Push <User Schedule>  gabapentin 100 milliGRAM(s) Oral every 12 hours  heparin  Infusion 300 Unit(s)/Hr (14 mL/Hr) IV Continuous <Continuous>  insulin lispro (ADMELOG) corrective regimen sliding scale   SubCutaneous every 6 hours  ketamine Injectable 60 milliGRAM(s) IV Push once  lidocaine   4% Patch 2 Patch Transdermal daily  lidocaine 1% Injectable 10 milliLiter(s) Local Injection once  lidocaine 2% Injection for Nebulization 8 milliLiter(s) Nebulizer once  meropenem  IVPB 500 milliGRAM(s) IV Intermittent every 24 hours  Nephro-elicia 1 Tablet(s) Oral daily  pantoprazole  Injectable 40 milliGRAM(s) IV Push daily  polyethylene glycol 3350 17 Gram(s) Oral two times a day  senna 2 Tablet(s) Oral at bedtime  sodium chloride 0.9%. 1000 milliLiter(s) (10 mL/Hr) IV Continuous <Continuous>  sodium chloride 3%  Inhalation 4 milliLiter(s) Inhalation every 6 hours  tobramycin for Nebulization 300 milliGRAM(s) Inhalation every 12 hours  traZODone 100 milliGRAM(s) Oral at bedtime  vasopressin Infusion 0.03 Unit(s)/Min (4.5 mL/Hr) IV Continuous <Continuous>      LABS:  01-12    136  |  99  |  37[H]  ----------------------------<  142[H]  3.9   |  23  |  3.21[H]    Ca    8.4      12 Jan 2025 00:30  Phos  3.9     01-12  Mg     2.2     01-12    TPro  5.9[L]  /  Alb  3.0[L]  /  TBili  1.3[H]  /  DBili      /  AST  24  /  ALT  48[H]  /  AlkPhos  91  01-12    Creatinine Trend: 3.21 <--, 4.10 <--, 2.98 <--, 1.99 <--, 2.00 <--, 2.17 <--, 2.45 <--    Albumin: 3.0 g/dL (01-12 @ 00:30)  Phosphorus: 3.9 mg/dL (01-12 @ 00:30)                              8.8    21.67 )-----------( 198      ( 12 Jan 2025 00:30 )             27.4     Urine Studies:  Urinalysis - [01-12-25 @ 00:30]      Color  / Appearance  / SG  / pH       Gluc 142 / Ketone   / Bili  / Urobili        Blood  / Protein  / Leuk Est  / Nitrite       RBC  / WBC  / Hyaline  / Gran  / Sq Epi  / Non Sq Epi  / Bacteria       Iron 29, TIBC 143, %sat 20      [12-19-24 @ 05:00]  Ferritin 904      [12-19-24 @ 05:00]  TSH 4.75      [12-24-24 @ 06:50]    HBsAg Nonreact      [12-19-24 @ 05:00]      RADIOLOGY & ADDITIONAL STUDIES:

## 2025-01-13 LAB
ALBUMIN SERPL ELPH-MCNC: 3.2 G/DL — LOW (ref 3.3–5)
ALP SERPL-CCNC: 85 U/L — SIGNIFICANT CHANGE UP (ref 40–120)
ALT FLD-CCNC: 36 U/L — SIGNIFICANT CHANGE UP (ref 10–45)
APTT BLD: 39.5 SEC — HIGH (ref 24.5–35.6)
APTT BLD: 43.8 SEC — HIGH (ref 24.5–35.6)
APTT BLD: 46.3 SEC — HIGH (ref 24.5–35.6)
AST SERPL-CCNC: 23 U/L — SIGNIFICANT CHANGE UP (ref 10–40)
BASE EXCESS BLDV CALC-SCNC: -0.4 MMOL/L — SIGNIFICANT CHANGE UP (ref -2–3)
BASOPHILS # BLD AUTO: 0.05 K/UL — SIGNIFICANT CHANGE UP (ref 0–0.2)
BASOPHILS NFR BLD AUTO: 0.2 % — SIGNIFICANT CHANGE UP (ref 0–2)
BILIRUB SERPL-MCNC: 1 MG/DL — SIGNIFICANT CHANGE UP (ref 0.2–1.2)
BUN SERPL-MCNC: 52 MG/DL — HIGH (ref 7–23)
CALCIUM SERPL-MCNC: 8.6 MG/DL — SIGNIFICANT CHANGE UP (ref 8.4–10.5)
CHLORIDE SERPL-SCNC: 97 MMOL/L — SIGNIFICANT CHANGE UP (ref 96–108)
CO2 BLDV-SCNC: 26 MMOL/L — SIGNIFICANT CHANGE UP (ref 22–26)
CO2 SERPL-SCNC: 22 MMOL/L — SIGNIFICANT CHANGE UP (ref 22–31)
CREAT SERPL-MCNC: 4.59 MG/DL — HIGH (ref 0.5–1.3)
EGFR: 14 ML/MIN/1.73M2 — LOW
EGFR: 14 ML/MIN/1.73M2 — LOW
EOSINOPHIL # BLD AUTO: 0.03 K/UL — SIGNIFICANT CHANGE UP (ref 0–0.5)
EOSINOPHIL NFR BLD AUTO: 0.1 % — SIGNIFICANT CHANGE UP (ref 0–6)
GAS PNL BLDA: SIGNIFICANT CHANGE UP
GLUCOSE SERPL-MCNC: 238 MG/DL — HIGH (ref 70–99)
HCO3 BLDV-SCNC: 25 MMOL/L — SIGNIFICANT CHANGE UP (ref 22–29)
HCT VFR BLD CALC: 28.4 % — LOW (ref 39–50)
HGB BLD-MCNC: 9.3 G/DL — LOW (ref 13–17)
IMM GRANULOCYTES NFR BLD AUTO: 0.9 % — SIGNIFICANT CHANGE UP (ref 0–0.9)
LYMPHOCYTES # BLD AUTO: 0.14 K/UL — LOW (ref 1–3.3)
LYMPHOCYTES # BLD AUTO: 0.6 % — LOW (ref 13–44)
MAGNESIUM SERPL-MCNC: 2.4 MG/DL — SIGNIFICANT CHANGE UP (ref 1.6–2.6)
MCHC RBC-ENTMCNC: 30.5 PG — SIGNIFICANT CHANGE UP (ref 27–34)
MCHC RBC-ENTMCNC: 32.7 G/DL — SIGNIFICANT CHANGE UP (ref 32–36)
MCV RBC AUTO: 93.1 FL — SIGNIFICANT CHANGE UP (ref 80–100)
MONOCYTES # BLD AUTO: 0.26 K/UL — SIGNIFICANT CHANGE UP (ref 0–0.9)
MONOCYTES NFR BLD AUTO: 1.2 % — LOW (ref 2–14)
NEUTROPHILS # BLD AUTO: 21.05 K/UL — HIGH (ref 1.8–7.4)
NEUTROPHILS NFR BLD AUTO: 97 % — HIGH (ref 43–77)
NRBC # BLD: 0 /100 WBCS — SIGNIFICANT CHANGE UP (ref 0–0)
NRBC BLD-RTO: 0 /100 WBCS — SIGNIFICANT CHANGE UP (ref 0–0)
PCO2 BLDV: 42 MMHG — SIGNIFICANT CHANGE UP (ref 42–55)
PHOSPHATE SERPL-MCNC: 5 MG/DL — HIGH (ref 2.5–4.5)
PLATELET # BLD AUTO: 226 K/UL — SIGNIFICANT CHANGE UP (ref 150–400)
PO2 BLDV: 54 MMHG — HIGH (ref 25–45)
POTASSIUM SERPL-MCNC: 4.6 MMOL/L — SIGNIFICANT CHANGE UP (ref 3.5–5.3)
PROT SERPL-MCNC: 6.3 G/DL — SIGNIFICANT CHANGE UP (ref 6–8.3)
RBC # BLD: 3.05 M/UL — LOW (ref 4.2–5.8)
RBC # FLD: 21.1 % — HIGH (ref 10.3–14.5)
SAO2 % BLDV: 85.2 % — SIGNIFICANT CHANGE UP (ref 67–88)
SODIUM SERPL-SCNC: 136 MMOL/L — SIGNIFICANT CHANGE UP (ref 135–145)
WBC # BLD: 21.73 K/UL — HIGH (ref 3.8–10.5)
WBC # FLD AUTO: 21.73 K/UL — HIGH (ref 3.8–10.5)

## 2025-01-13 PROCEDURE — G0545: CPT

## 2025-01-13 PROCEDURE — 71045 X-RAY EXAM CHEST 1 VIEW: CPT | Mod: 26,77

## 2025-01-13 PROCEDURE — 99232 SBSQ HOSP IP/OBS MODERATE 35: CPT

## 2025-01-13 PROCEDURE — 99231 SBSQ HOSP IP/OBS SF/LOW 25: CPT

## 2025-01-13 PROCEDURE — 99291 CRITICAL CARE FIRST HOUR: CPT

## 2025-01-13 PROCEDURE — 71045 X-RAY EXAM CHEST 1 VIEW: CPT | Mod: 26

## 2025-01-13 RX ORDER — LIDOCAINE AND PRILOCAINE 25; 25 MG/G; MG/G
1 CREAM TOPICAL DAILY
Refills: 0 | Status: DISCONTINUED | OUTPATIENT
Start: 2025-01-13 | End: 2025-03-19

## 2025-01-13 RX ORDER — BENZOCAINE 220 MG/G
1 SPRAY, METERED PERIODONTAL EVERY 6 HOURS
Refills: 0 | Status: DISCONTINUED | OUTPATIENT
Start: 2025-01-13 | End: 2025-01-29

## 2025-01-13 RX ORDER — SODIUM CHLORIDE 0.65 %
1 AEROSOL, SPRAY (ML) NASAL EVERY 12 HOURS
Refills: 0 | Status: DISCONTINUED | OUTPATIENT
Start: 2025-01-13 | End: 2025-03-19

## 2025-01-13 RX ORDER — ATORVASTATIN CALCIUM 80 MG/1
80 TABLET, FILM COATED ORAL AT BEDTIME
Refills: 0 | Status: DISCONTINUED | OUTPATIENT
Start: 2025-01-13 | End: 2025-03-19

## 2025-01-13 RX ORDER — ACETAMINOPHEN 500 MG/5ML
1000 LIQUID (ML) ORAL ONCE
Refills: 0 | Status: COMPLETED | OUTPATIENT
Start: 2025-01-13 | End: 2025-01-13

## 2025-01-13 RX ADMIN — LIDOCAINE AND PRILOCAINE 1 APPLICATION(S): 25; 25 CREAM TOPICAL at 08:41

## 2025-01-13 RX ADMIN — Medication 81 MILLIGRAM(S): at 11:19

## 2025-01-13 RX ADMIN — IPRATROPIUM BROMIDE AND ALBUTEROL SULFATE 3 MILLILITER(S): .5; 2.5 SOLUTION RESPIRATORY (INHALATION) at 05:27

## 2025-01-13 RX ADMIN — IPRATROPIUM BROMIDE AND ALBUTEROL SULFATE 3 MILLILITER(S): .5; 2.5 SOLUTION RESPIRATORY (INHALATION) at 23:06

## 2025-01-13 RX ADMIN — IPRATROPIUM BROMIDE AND ALBUTEROL SULFATE 3 MILLILITER(S): .5; 2.5 SOLUTION RESPIRATORY (INHALATION) at 17:28

## 2025-01-13 RX ADMIN — Medication 1 SPRAY(S): at 11:19

## 2025-01-13 RX ADMIN — Medication 1 APPLICATION(S): at 05:22

## 2025-01-13 RX ADMIN — TOBRAMYCIN 300 MILLIGRAM(S): 300 SOLUTION RESPIRATORY (INHALATION) at 05:27

## 2025-01-13 RX ADMIN — Medication 4 MILLILITER(S): at 17:34

## 2025-01-13 RX ADMIN — Medication 4 MILLILITER(S): at 11:37

## 2025-01-13 RX ADMIN — GABAPENTIN 100 MILLIGRAM(S): 400 CAPSULE ORAL at 05:09

## 2025-01-13 RX ADMIN — Medication 125 MILLIGRAM(S): at 05:09

## 2025-01-13 RX ADMIN — MEROPENEM 100 MILLIGRAM(S): 1 INJECTION INTRAVENOUS at 21:33

## 2025-01-13 RX ADMIN — LIDOCAINE HYDROCHLORIDE 2 PATCH: 20 JELLY TOPICAL at 22:49

## 2025-01-13 RX ADMIN — Medication 2 UNIT(S)/HR: at 05:20

## 2025-01-13 RX ADMIN — INSULIN LISPRO 4: 100 INJECTION, SOLUTION INTRAVENOUS; SUBCUTANEOUS at 00:32

## 2025-01-13 RX ADMIN — Medication 4 MILLILITER(S): at 05:27

## 2025-01-13 RX ADMIN — Medication 400 MILLIGRAM(S): at 04:15

## 2025-01-13 RX ADMIN — Medication 4 MILLILITER(S): at 23:06

## 2025-01-13 RX ADMIN — IPRATROPIUM BROMIDE AND ALBUTEROL SULFATE 3 MILLILITER(S): .5; 2.5 SOLUTION RESPIRATORY (INHALATION) at 11:36

## 2025-01-13 RX ADMIN — Medication 1000 MILLIGRAM(S): at 04:45

## 2025-01-13 RX ADMIN — LIDOCAINE HYDROCHLORIDE 2 PATCH: 20 JELLY TOPICAL at 19:01

## 2025-01-13 RX ADMIN — TOBRAMYCIN 300 MILLIGRAM(S): 300 SOLUTION RESPIRATORY (INHALATION) at 17:29

## 2025-01-13 RX ADMIN — HEPARIN SODIUM 13 UNIT(S)/HR: 1000 INJECTION INTRAVENOUS; SUBCUTANEOUS at 21:32

## 2025-01-13 RX ADMIN — GABAPENTIN 100 MILLIGRAM(S): 400 CAPSULE ORAL at 17:01

## 2025-01-13 RX ADMIN — Medication 125 MILLIGRAM(S): at 17:01

## 2025-01-13 RX ADMIN — Medication 5 MILLIGRAM(S): at 13:35

## 2025-01-13 RX ADMIN — Medication 1 TABLET(S): at 11:19

## 2025-01-13 RX ADMIN — ATORVASTATIN CALCIUM 80 MILLIGRAM(S): 80 TABLET, FILM COATED ORAL at 21:32

## 2025-01-13 RX ADMIN — LIDOCAINE HYDROCHLORIDE 2 PATCH: 20 JELLY TOPICAL at 11:19

## 2025-01-13 RX ADMIN — HEPARIN SODIUM 13 UNIT(S)/HR: 1000 INJECTION INTRAVENOUS; SUBCUTANEOUS at 07:57

## 2025-01-13 RX ADMIN — AMIODARONE HYDROCHLORIDE 400 MILLIGRAM(S): 50 INJECTION, SOLUTION INTRAVENOUS at 05:09

## 2025-01-13 RX ADMIN — Medication 40 MILLIGRAM(S): at 11:19

## 2025-01-13 RX ADMIN — Medication 1 APPLICATION(S): at 21:33

## 2025-01-13 RX ADMIN — DOBUTAMINE 2.55 MICROGRAM(S)/KG/MIN: 250 INJECTION INTRAVENOUS at 07:55

## 2025-01-13 RX ADMIN — DEXAMETHASONE 6 MILLIGRAM(S): 0.5 TABLET ORAL at 05:09

## 2025-01-13 RX ADMIN — Medication 1 SPRAY(S): at 16:37

## 2025-01-13 RX ADMIN — Medication 100 MILLIGRAM(S): at 21:33

## 2025-01-13 RX ADMIN — DOBUTAMINE 1.28 MICROGRAM(S)/KG/MIN: 250 INJECTION INTRAVENOUS at 21:32

## 2025-01-13 RX ADMIN — EPOETIN ALFA 10000 UNIT(S): 10000 SOLUTION INTRAVENOUS; SUBCUTANEOUS at 10:06

## 2025-01-13 RX ADMIN — HEPARIN SODIUM 13 UNIT(S)/HR: 1000 INJECTION INTRAVENOUS; SUBCUTANEOUS at 17:01

## 2025-01-13 RX ADMIN — Medication 5 MILLIGRAM(S): at 13:05

## 2025-01-13 RX ADMIN — Medication 2 UNIT(S)/HR: at 21:33

## 2025-01-13 NOTE — CHART NOTE - NSCHARTNOTEFT_GEN_A_CORE
NUTRITION FOLLOW UP NOTE    PATIENT SEEN FOR: ICU follow up    SOURCE: [x] Patient  [x] Current Medical Record  [] RN  [] Family/support person at bedside  [] Patient unavailable/inappropriate  [] Other:    CHART REVIEWED/EVENTS NOTED.  [x] No changes to nutrition care plan to note  [x] Nutrition Status:  -s/p CABG 24  -Cardiogenic and Septic shock  -Hx ESRD on HD, off Continuous renal replacement therapy since  and now on HD.  -s/p Swallow Bedside Assessment  with recommendations for NPO, with non-oral nutrition/hydration/medications. Pending FEES for furhter evaluation.  -Per  Provider Contact Note: "Pt found to be eating Peanut M&Ms ... Nurse entered room and questioned the smell of peanut butter, noticed patient chewing on something. Wife at the bedside ... PT admitted to eating 1 then 2 peanut M&Ms. M&M dye noted on NGT."    DIET ORDER:   Diet, NPO with Tube Feed:   Tube Feeding Modality: Nasogastric  Vital 1.5 Jeremias (VITAL1.5RTH)  Total Volume for 24 Hours (mL): 1440  Continuous  Starting Tube Feed Rate {mL per Hour}: 20  Increase Tube Feed Rate by (mL): 10  Until Goal Tube Feed Rate (mL per Hour): 60  Tube Feed Duration (in Hours): 24  Tube Feed Start Time: 13:00  No Carb Prosource TF     Qty per Day:  1 (25)      CURRENT DIET ORDER IS:  [] Appropriate:  [] Inadequate:  [x] Other: See recommendation below for formula change if patient to stay on enteral nutrition     NUTRITION INTAKE/PROVISION:  [] PO:  [x] Enteral Nutrition:  EN Order Provides: EN Order + Prosource TF Free 1x/day Provides: 1440ml formula, 2250kcal, 112g protein, 1100ml free water; meets 28.8kcal/kg and 1.44g/kg protein based on IBW 172lb/78kg.     Current Pump Rate: 60ml/hr at time of RD visit  5-Day Average Enteral Provision per RN flowsheet: 698 mL, 1047 kcals, 33 g protein  [] Parenteral Nutrition:    ANTHROPOMETRICS:  Drug Dosing Weight  Height (cm): 180.3 (30 Dec 2024 12:01)  Weight (kg): 85.1 (30 Dec 2024 12:01)  BMI (kg/m2): 26.2 (30 Dec 2024 12:01)  Weights:   Daily Weight in k (), 81 (), 88.7 (), 83.3 (), 97.6 (01-10), 79.6 (), 93.4 (), 87.9 (), 85 (), 86.2 (), 90 (), 84 (), 77.5 (), 79.5 (), 80.4 (), 87.8 (), 88.3 (, standing), 85.3 (, standing), 85 (, standing), 79.7 (), 80.9 (, standing), 81.3 (, standing), 82.6 ().  Weight fluctuations likely in setting of fluid shifts, scale inaccuracies, and/or Inadequate energy intake.    NUTRITIONALLY PERTINENT MEDICATIONS:  MEDICATIONS  (STANDING):  aMIOdarone    Tablet  aMIOdarone    Tablet  atorvastatin  bisacodyl Suppository  dextrose 50% Injectable  dextrose 50% Injectable  DOBUTamine Infusion  insulin lispro (ADMELOG) corrective regimen sliding scale  insulin regular Infusion  meropenem  IVPB  Nephro-elicia  pantoprazole  Injectable  sodium chloride 0.9%.  tobramycin for Nebulization  vancomycin    Solution       NUTRITIONALLY PERTINENT LABS:   Na136 mmol/L Glu 238 mg/dL[H] K+ 4.6 mmol/L Cr  4.59 mg/dL[H] BUN 52 mg/dL[H]  Phos 5.0 mg/dL[H]  Alb 3.2 g/dL[L] ALT 36 U/L AST 23 U/L Alkaline Phosphatase 85 U/L  24 @ 06:50 a1c 5.6    A1C with Estimated Average Glucose Result: 5.6 % (24 @ 06:50)          Finger Sticks:  POCT Blood Glucose.: 101 mg/dL ( @ 10:04)  POCT Blood Glucose.: 109 mg/dL ( @ 08:53)  POCT Blood Glucose.: 125 mg/dL ( @ 07:59)  POCT Blood Glucose.: 166 mg/dL ( @ 06:45)  POCT Blood Glucose.: 244 mg/dL ( @ 05:45)  POCT Blood Glucose.: 309 mg/dL ( @ 05:08)  POCT Blood Glucose.: 229 mg/dL ( @ 23:48)  POCT Blood Glucose.: 144 mg/dL ( @ 17:06)  POCT Blood Glucose.: 138 mg/dL ( @ 11:51)      NUTRITIONALLY PERTINENT MEDICATIONS/LABS:  [x] Reviewed  [x] Relevant notes on medications/labs:  -sliding scale insulin for glycemic control  -dobutamine @ 2 micrograms/kG/min   -off pressors    EDEMA:  [x] Reviewed  [x] Relevant notes: 1+ generalized per flowsheets     GI/ I&O:  [x] Reviewed  [x] Relevant notes: per flowsheets fecal incontinence and last BM today  [] Other:    SKIN:   [] No pressure injuries documented, per nursing flowsheet  [x] Pressure injury previously noted  -Per Wound Care :  Sacral region/BL buttocks deep tissue pressure injury   Rt upper back deep tissue pressure injury   [] Change in pressure injury documentation:  [x] Other: midsternal surgical incision from CABG     ESTIMATED NEEDS:  [] No change:  [x] Updated:  Energy:  7405-6938 kcal/day (30-35 kcal/kg)  Protein:  109-140 g/day (1.4-1.8 g/kg)  Fluid:   ml/day or [x] defer to team  Based on: IBW 172lb/78kg    NUTRITION DIAGNOSIS:  [x] Prior Dx: Inadequate energy intake, Increased Nutrient Needs (protein-energy, micronutrients)   [] New Dx:    EDUCATION:  [] Yes:  [x] Not appropriate/warranted    NUTRITION CARE PLAN:  1. Enteral nutrition: Pending results of FEES, if patient continues with enteral nutrition, recommend changing formula to Nepro @ 20ml/hr, increase by 10ml q6hr to goal rate 60ml/hr x 24hr, provides 1440ml formula, 2592kcal, 117g protein, 1047ml free water; meets 33.2kcal/kg and 1.50g/kg protein based on IBW 172lb/78kg. Defer additional free water flushes to medical team.   2. Diet: If patient cleared for po diet from FEES recommendations, recommend Consistent Carbohydrate diet, diet texture/consistency per speech language pathologist/medical team.   3. Multivitamin/mineral supplementation: Continue Nephro-elicia for wound healing pending no medical contraindications.  4: Monitor for malnutrition given Inadequate energy intake.    [] Achieved - Continue current nutrition intervention(s)  [] Current medical condition precludes nutrition intervention at this time.    MONITORING AND EVALUATION:   RD remains available upon request and will follow up per protocol.    Corinne Lima RDN, CDN - available via [place] TEAMS

## 2025-01-13 NOTE — PROGRESS NOTE ADULT - ASSESSMENT
54M with PMH of HTN, HLD, ESRD on HD MWF via LUE AVF, ascites (requiring repeat paracenteses q4-6wks), NICM with moderate LV dysfunction w/ EF 35-40%() and CAD s/p atherectomy + SALLIE to D1(2024) presents to [facility] transferred from [facility]. Patient initially presented to [facility] ED with shortness of breath. Of note he was recently admitted from - for acute cholecystitis s/p cholecystectomy. In [facility] ED, pt was hypoxic to 86% on RA, trop 1.7K, EKG no new changes, CXR fluid overload. Admitted for AHRF 2/2 fluid overload. Pt received HD on , ,  and . DAPT was resumed, TTE showed improvement in LVEF to 40-45%. Stress test was abnormal with 1mm inferior STD, reversible ischemia in the inferior wall and fixed defects in the lateral, anterior and apical walls with post stress EF of 24%. Pt was then transferred to [facility] for cardiac cath which showed pLAD 70% ISR, mLCx 70%, D1 severe dz. Patient now transferred to [facility] CTS Dr. Rivers for CABG eval. Patient denies any current SOB or chest pain on admission. Otherwise denies headaches, abdominal pain, urinary or bowel changes, fevers, chills, N/V/D, sick contacts.  (24 Dec 2024 05:07)   VSS  CP free   HD via avf  for daily HD pre op- on lovenox 30 qd    HD today continue with present meds. Plan for CABG possibleTVR next week   vss; rsr 55-80; hd today w/ 1 unit prbc; pt to be dialzyed also this  w/ another 1 unit prbc   VSS; RSR 60-80; continue asa/bb/ statin; HD in am - transfuse 1 unit prbc with HD; d/c lovenox after am dose sun    - Pt underwent  C3L free LIMA-LAD; SVG-Diag; SVG-leftPL  Pt given cefuroxime perioperatively   pt extubated   . pt with hypotension and ECHO showing Systolic HF/depressed BIV Function, currently supported with /pressors.  Labs this evening showing transaminitis  1/2 -3 pt hypotensive, febrile and reintubated  Pt pancultured. and started on zosyn- meropenem and gent  pt found to have Gram negative bacteremia    E coli +ESBL bacteremia        A/P  #sepsis  ? source - many options. Pt with abnormal u/a but is a dialysis pt so hard to interpret. Pt with h//o SBP- pt with ascites   s/p  paracentesis 1/3  cultures No Growth to date  - no organisms notes on gram stain  Likely from lungs - CT with consolidation Left lung, especially LLL  Continue meropenem  lines  were  changed, s/p d/c shiley   Pt with worsening WBC. ? from plug  Ct with left sided consolidation- check cultures. also with large ascites. team to do PARACENTESIS   check c diff if  continues to have diarrhea  check bladder scan- to make sure no urinary source- not distended on CT   U/S of graft  with stenosis but normal function.  IF NO ANSWER AFTER ABOVE THEN CONSIDER wbc TAGGED STUDY        #elevated LFTs  likely shock liver  acute  hepatitis serology- negative   trend  avoid hepatotoxic meds    improving daily         #thrombocytopenia   HIt ab negative  Likely from sepsis vs from IABP or drugs   trend   resolved !    #Renal failure  going back to Hemodialysis   changed the meropenem to daily dosing - FOR RENAL FUNCTION      Marla Champion M.D. ,   please reach via teams   If no answer, or after 5PM/ weekends,  then please call  [phone]    Assessment and plan discussed with the primary team .

## 2025-01-13 NOTE — PROGRESS NOTE ADULT - SUBJECTIVE AND OBJECTIVE BOX
[facility]    Dr. Nica Styles     Office [phone] (9 am to 5 pm)  Service: [phone] (5pm to 9am)  Also Available on TEAMS      RENAL PROGRESS NOTE: DATE OF SERVICE 01-13-25 @ 09:15    Patient is a 54y old  Male who presents with a chief complaint of CAD s/p CABG (12 Jan 2025 09:14)      Patient seen and examined at bedside. No chest pain/sob    VITALS:  T(F): 97.6 (01-13-25 @ 08:00), Max: 99 (01-12-25 @ 12:00)  HR: 78 (01-13-25 @ 09:00)  BP: 102/56 (01-13-25 @ 08:00)  RR: 29 (01-13-25 @ 09:00)  SpO2: 92% (01-13-25 @ 09:00)  Wt(kg): --    01-12 @ 07:01  -  01-13 @ 07:00  --------------------------------------------------------  IN: 1703.8 mL / OUT: 0 mL / NET: 1703.8 mL          PHYSICAL EXAM:  Constitutional: NAD  Neck: No JVD  Respiratory: CTAB, no wheezes, rales or rhonchi  Cardiovascular: S1, S2, RRR  Gastrointestinal: BS+, soft, NT/ND  Extremities: No peripheral edema    Hospital Medications:   MEDICATIONS  (STANDING):  albuterol/ipratropium for Nebulization 3 milliLiter(s) Nebulizer every 6 hours  aMIOdarone    Tablet   Oral   aMIOdarone    Tablet 200 milliGRAM(s) Oral daily  aspirin  chewable 81 milliGRAM(s) Oral daily  bisacodyl Suppository 10 milliGRAM(s) Rectal once  chlorhexidine 2% Cloths 1 Application(s) Topical daily  chlorhexidine 4% Liquid 1 Application(s) Topical <User Schedule>  dextrose 50% Injectable 50 milliLiter(s) IV Push every 15 minutes  dextrose 50% Injectable 25 milliLiter(s) IV Push every 15 minutes  DOBUTamine Infusion 2 MICROgram(s)/kG/Min (2.55 mL/Hr) IV Continuous <Continuous>  epoetin ignacia (EPOGEN) Injectable 91569 Unit(s) IV Push <User Schedule>  gabapentin 100 milliGRAM(s) Oral every 12 hours  heparin  Infusion 300 Unit(s)/Hr (13 mL/Hr) IV Continuous <Continuous>  insulin lispro (ADMELOG) corrective regimen sliding scale   SubCutaneous every 6 hours  insulin regular Infusion 2 Unit(s)/Hr (2 mL/Hr) IV Continuous <Continuous>  lidocaine   4% Patch 2 Patch Transdermal daily  meropenem  IVPB 500 milliGRAM(s) IV Intermittent every 24 hours  Nephro-elicia 1 Tablet(s) Oral daily  pantoprazole  Injectable 40 milliGRAM(s) IV Push daily  sodium chloride 0.9%. 1000 milliLiter(s) (10 mL/Hr) IV Continuous <Continuous>  sodium chloride 3%  Inhalation 4 milliLiter(s) Inhalation every 6 hours  tobramycin for Nebulization 300 milliGRAM(s) Inhalation every 12 hours  traZODone 100 milliGRAM(s) Oral at bedtime  vancomycin    Solution 125 milliGRAM(s) Oral every 12 hours      LABS:  01-13    136  |  97  |  52[H]  ----------------------------<  238[H]  4.6   |  22  |  4.59[H]    Ca    8.6      13 Jan 2025 00:26  Phos  5.0     01-13  Mg     2.4     01-13    TPro  6.3  /  Alb  3.2[L]  /  TBili  1.0  /  DBili      /  AST  23  /  ALT  36  /  AlkPhos  85  01-13    Creatinine Trend: 4.59 <--, 3.21 <--, 4.10 <--, 2.98 <--, 1.99 <--, 2.00 <--, 2.17 <--    Phosphorus: 5.0 mg/dL (01-13 @ 00:26)  Albumin: 3.2 g/dL (01-13 @ 00:26)                              9.3    21.73 )-----------( 226      ( 13 Jan 2025 00:26 )             28.4     Urine Studies:  Urinalysis - [01-13-25 @ 00:26]      Color  / Appearance  / SG  / pH       Gluc 238 / Ketone   / Bili  / Urobili        Blood  / Protein  / Leuk Est  / Nitrite       RBC  / WBC  / Hyaline  / Gran  / Sq Epi  / Non Sq Epi  / Bacteria       Iron 29, TIBC 143, %sat 20      [12-19-24 @ 05:00]  Ferritin 904      [12-19-24 @ 05:00]  TSH 4.75      [12-24-24 @ 06:50]    HBsAg Nonreact      [12-19-24 @ 05:00]      RADIOLOGY & ADDITIONAL STUDIES:

## 2025-01-13 NOTE — PROGRESS NOTE ADULT - ASSESSMENT
Assessment  54 year old male with HTN, HLD, ESRD on HD, ascites, CHF, and CAD s/p PCI (4/2024) transferred to [facility] from [facility] for cardiac catheterization due to abnormal stress test. Cath showed multivessel disease now s/p CABG 12/30/24. Course c/b acute on chronic hypoxemic respiratory failure in setting of recurrent mucus plugging on left s/p multiple bronchoscopies.     overall pt is improving and doing better with airway clearance.    Recommendations  - Aggressive pulmonary toilet  - duonebs q6h  - 3% saline nebs q6h  - chest pt   - c/w  Volara to airway clearance regimen        transplant pulm to follow

## 2025-01-13 NOTE — SWALLOW FEES ASSESSMENT ADULT - DIAGNOSTIC IMPRESSIONS
53 yo M s/p CABGx3; c/b post op hypoxia; septic shock; IABP, reintubated 1/2-1/8. Patient presents on FEES with a pharyngeal dysphagia, dysphonia, and odynophagia. Oral management is adequate across textures trialed. In the pharyngeal stage, pooling and penetration of frothy secretions are noted. Secretions are not cleared with use of ice chips. There is reduced airway closure and reduced supra/subglottic sensation contributing to laryngeal penetration and aspiration of purees, thickened liquids and ice chips. Compensatory swallow strategies are not effective in maintaining airway protection (chin tuck, head rotation, cued cough, effortful/repeat swallow). Patient c/o 10/10 throat pain on swallowing. Reduced movement of the left vocal cord is noted. Pharyngeal and laryngeal mucosa appear otherwise unremarkable. ENT consult may be considered for dysphagia, dysphonia and odynophagia. 55 yo M s/p CABGx3; c/b post op hypoxia; septic shock; IABP, reintubated 1/2-1/8. Patient presents on FEES with a pharyngeal dysphagia, dysphonia, and odynophagia. Oral management is adequate across textures trialed. In the pharyngeal stage, pooling and penetration of frothy secretions are noted. Secretions are not cleared with use of ice chips. There is reduced airway closure and reduced supra/subglottic sensation contributing to laryngeal penetration and aspiration of purees, thickened liquids and ice chips. Compensatory swallow strategies are not effective in maintaining airway protection (chin tuck, head rotation, cued cough, effortful/repeat swallow). Patient c/o throat pain on swallowing. Reduced movement of the left vocal cord is noted. Pharyngeal and laryngeal mucosa appear otherwise unremarkable. ENT consult may be considered for dysphagia, dysphonia and odynophagia.

## 2025-01-13 NOTE — PROGRESS NOTE ADULT - SUBJECTIVE AND OBJECTIVE BOX
Patient seen and examined at the bedside.    Remains critically ill on continuous ICU monitoring.      Brief Summary:  55 yo M with multiple medical problems including CAD s/p PCI in April 2024 s/p CABG x 3 on 12/30/24 1/2: Intubated for hypoxia & left lung white out s/p awake bronch with removal of copious mucopurulent secretions  1/4: s/p IABP insertion, sepsis/septic shock due to E coli       24 Hour events:  Bronchoscopy done yesterday    Objective:  Vital Signs Last 24 Hrs  T(C): 37 (13 Jan 2025 04:00), Max: 37.2 (12 Jan 2025 12:00)  T(F): 98.6 (13 Jan 2025 04:00), Max: 99 (12 Jan 2025 12:00)  HR: 80 (13 Jan 2025 07:00) (72 - 91)  BP: 104/58 (13 Jan 2025 07:00) (101/56 - 136/69)  BP(mean): 75 (13 Jan 2025 07:00) (74 - 95)  RR: 28 (13 Jan 2025 07:00) (15 - 28)  SpO2: 100% (13 Jan 2025 07:00) (97% - 100%)    Parameters below as of 13 Jan 2025 05:42  Patient On (Oxygen Delivery Method): face tent      Physical Exam:   General: Alert and interactive   Neurology: Oriented, following commands  Respiratory: diminished left base   CV: Sinus  Abdominal: Soft, Nontender  Extremities: Warm, well-perfused  -------------------------------------------------------------------------------------------------------------------------------    Labs:                        9.3    21.73 )-----------( 226      ( 13 Jan 2025 00:26 )             28.4     01-13    136  |  97  |  52[H]  ----------------------------<  238[H]  4.6   |  22  |  4.59[H]    Ca    8.6      13 Jan 2025 00:26  Phos  5.0     01-13  Mg     2.4     01-13    TPro  6.3  /  Alb  3.2[L]  /  TBili  1.0  /  DBili  x   /  AST  23  /  ALT  36  /  AlkPhos  85  01-13    LIVER FUNCTIONS - ( 13 Jan 2025 00:26 )  Alb: 3.2 g/dL / Pro: 6.3 g/dL / ALK PHOS: 85 U/L / ALT: 36 U/L / AST: 23 U/L / GGT: x           PT/INR - ( 12 Jan 2025 22:43 )   PT: 14.4 sec;   INR: 1.27 ratio         PTT - ( 13 Jan 2025 07:36 )  PTT:43.8 sec  ABG - ( 12 Jan 2025 23:53 )  pH, Arterial: 7.39  pH, Blood: x     /  pCO2: 38    /  pO2: 76    / HCO3: 23    / Base Excess: -1.7  /  SaO2: 97.3    ------------------------------------------------------------------------------------------------------------------------------  Assessment:  55 yo M s/p CABG x 3 on 12/30/24    Postop acute pulmonary insufficiency  Cardiogenic and Septic shock  Acute blood loss anemia  Transaminitis  Ascites    ESRD on iHD requiring CRRT   E coli bacteremia 2/2 pneumonia  Leukocytosis     Plan:  ***Neuro***  Postoperative acute pain control with Gabapentin   Trazadone nightly    ***Cardiovascular***  s/p CABG x 3  d/c R. fem IABP 1/6  Alex weaned off 1/7  MAP > 65 mm Hg.   Remains on Dobutamine - wean as tolerated.   PO Amiodarone for Afib - patient declined cardioversion   On Midodrine   ASA in setting of thrombocytopenia   1/5 TTE  LVEF 40%, reduced and enlarged RV  1/9 TTE  LVEF 35%, reduced and enlarged RV    ***Pulmonary***  Postoperative acute pulmonary insufficiency - extubated 1/8 to HFNC/BiPAP  Face tent  Left lung collapse  Bronchoscopy yesterday    ***GI***  Tube feeds   Protonix for stress ulcer prophylaxis.   Ascites: Paracentesis 1/11    ***Renal***  Hx of ESRD iHD - HD done 1/11  LUE AV fistula     ***ID***  Sepsis/septic shock due to ESBL E. coli - Continue Meropenem. persistent leukocytosis   1/9 Bcx pending   1/7 Bcx negative to date  1/5 Bcx negative to date  1/3 Bronch ESBL E Coli   1/2 Bcx ESBL E coli   Trend leukocytosis.  Surgical chest wall wound well appearing - no active infection   PO Vancomycin for C diff prophylaxis     ***Endocrine***  Hyperglycemia- Insulin sliding scale and infusion    ***Hematology***  Acute blood loss anemia  No current transfusion indication.  PF4 negative 1/4  Heparin gtt for persistent AF         Patient requires continuous monitoring with bedside rhythm monitoring, pulse oximetry monitoring, and continuous central venous and arterial pressure monitoring; and intermittent blood gas analysis. Care plan discussed with the ICU care team.   Patient remains critical, at risk for life threatening decompensation.    I have spent 30 minutes providing critical care management to this patient.    By signing my name below, I, Edinsonerummenadine Barbosa, attest that this documentation has been prepared under the direction and in the presence of Jen Sierra MD  Electronically signed: Angelo Barbosa, 01-13-25 @ 08:29    I, Jen Sierra MD, personally performed the services described in this documentation. all medical record entries made by the scribe were at my direction and in my presence. I have reviewed the chart and agree that the record reflects my personal performance and is accurate and complete  Electronically signed: Jen Sierra MD Patient seen and examined at the bedside.    Remains critically ill on continuous ICU monitoring.      Brief Summary:  53 yo M with multiple medical problems including CAD s/p PCI in April 2024 s/p CABG x 3 on 12/30/24 1/2: Intubated for hypoxia & left lung white out s/p awake bronch with removal of copious mucopurulent secretions  1/4: s/p IABP insertion, sepsis/septic shock due to E coli       24 Hour events:  Insulin infusion resumed.  Significant pain from sacral wound.  Decadron for 24 hours for airway edema.      Objective:  Vital Signs Last 24 Hrs  T(C): 37 (13 Jan 2025 04:00), Max: 37.2 (12 Jan 2025 12:00)  T(F): 98.6 (13 Jan 2025 04:00), Max: 99 (12 Jan 2025 12:00)  HR: 80 (13 Jan 2025 07:00) (72 - 91)  BP: 104/58 (13 Jan 2025 07:00) (101/56 - 136/69)  BP(mean): 75 (13 Jan 2025 07:00) (74 - 95)  RR: 28 (13 Jan 2025 07:00) (15 - 28)  SpO2: 100% (13 Jan 2025 07:00) (97% - 100%)    Parameters below as of 13 Jan 2025 05:42  Patient On (Oxygen Delivery Method): face tent      Physical Exam:   General: Alert and interactive   Neurology: Oriented, following commands  Respiratory: Diminished at bases  CV: Sinus  Abdominal: Soft, Nontender  Extremities: Warm, well-perfused  -------------------------------------------------------------------------------------------------------------------------------    Labs:                        9.3    21.73 )-----------( 226      ( 13 Jan 2025 00:26 )             28.4     01-13    136  |  97  |  52[H]  ----------------------------<  238[H]  4.6   |  22  |  4.59[H]    Ca    8.6      13 Jan 2025 00:26  Phos  5.0     01-13  Mg     2.4     01-13    TPro  6.3  /  Alb  3.2[L]  /  TBili  1.0  /  DBili  x   /  AST  23  /  ALT  36  /  AlkPhos  85  01-13    LIVER FUNCTIONS - ( 13 Jan 2025 00:26 )  Alb: 3.2 g/dL / Pro: 6.3 g/dL / ALK PHOS: 85 U/L / ALT: 36 U/L / AST: 23 U/L / GGT: x           PT/INR - ( 12 Jan 2025 22:43 )   PT: 14.4 sec;   INR: 1.27 ratio        PTT - ( 13 Jan 2025 07:36 )  PTT:43.8 sec  ABG - ( 12 Jan 2025 23:53 )  pH, Arterial: 7.39  pH, Blood: x     /  pCO2: 38    /  pO2: 76    / HCO3: 23    / Base Excess: -1.7  /  SaO2: 97.3    ------------------------------------------------------------------------------------------------------------------------------  Assessment:  53 yo M s/p CABG x 3 on 12/30/24    Postop acute pulmonary insufficiency  Cardiogenic and Septic shock  Acute blood loss anemia  Ascites    ESRD on iHD   E coli bacteremia 2/2 pneumonia  Leukocytosis       Plan:  ***Neuro***  Postoperative acute pain control with Gabapentin and prns.  Trazadone nightly  PT ongoing.    ***Cardiovascular***  s/p CABG x 3  Remains on Dobutamine  PO Amiodarone for Afib - completing load.  Midodrine prn for BP support.  ASA daily. Statin added now that LFTs normal.    ***Pulmonary***  Postoperative acute pulmonary insufficiency  Face tent for oxygen supplementation.  Bronchoscopy yesterday  Aggressive pulmonary clearance, [place], mobilization.    ***GI***  Tube feeds   Protonix for stress ulcer prophylaxis.   Ascites: Paracentesis 1/11    ***Renal***  ESRD - HD today.  LUE AV fistula     ***ID***  Sepsis/septic shock due to ESBL E. coli - Continue Meropenem. Trend leukocytosis   Also on Mitch nebs.  1/9 Bcx pending   1/7 Bcx negative to date  1/5 Bcx negative to date  1/3 Bronch ESBL E Coli   1/2 Bcx ESBL E coli   PO Vancomycin for C diff prophylaxis     ***Endocrine***  Hyperglycemia- Insulin infusion    ***Hematology***  Acute blood loss anemia  No current transfusion indication.  PF4 negative 1/4  Heparin infusion for recent a fib.  Epogen.        Care plan discussed with the ICU care team.   Patient remains critical, at risk for life threatening decompensation.    I have spent 55 minutes providing critical care management to this patient.    By signing my name below, I, Angelo Barbosa, attest that this documentation has been prepared under the direction and in the presence of Jen Sierra MD  Electronically signed: Angelo Barbosa, 01-13-25 @ 08:29    I, Jen Sierra MD, personally performed the services described in this documentation. All medical record entries made by the scribe were at my direction and in my presence. I have reviewed the chart and agree that the record reflects my personal performance and is accurate and complete  Electronically signed: Jen Sierra MD

## 2025-01-13 NOTE — PROGRESS NOTE ADULT - ASSESSMENT
54M with PMH of HTN, HLD, ESRD on HD MWF via LUE AVF, ascites (requiring repeat paracenteses q4-6wks), NICM with moderate LV dysfunction w/ EF 35-40%(2023) and CAD s/p atherectomy + SALLIE to D1(4/11/2024) presents to [facility] transferred from [facility] for CABG eval  Nephro on [facility] for HD    ESRD on HD   Regular schedule MWF   Access LUE AVF  [facility]  Nephrologist Dr. Bailey  Last HD on 12/24  S/p HD on 12/27 12/29, 12/30 for hyperkalemia and 12/31  2 L PUF On 01/02/2024  However hospital course now complicated by Cardiogenic shock now on multiple pressors,   CRRT initiated 01/03, off since 01/08   S/p PUF on 01/09   HD held on 01/10 due to instability,  S/p HD on 01/11 2 L UF rmeoved  HD today on minimal pressors   Keep MAP>65  Renal Diet  Consent in physical chart    Hyper/Hypokalemia  Monitor K, will change dialysate fluid as needed    HTN  currently on pressure support  monitor bp     CKD MBD  Can send a PTH  Ca and Phos daily    Anemia  CRISTIANE with HD  Transfuse if hgb <7    CAD with multivessel disease  s/p CABG 12/30  CTICU following

## 2025-01-13 NOTE — PROGRESS NOTE ADULT - ASSESSMENT
A/P:  54M with PMH of HTN, HLD, ESRD on HD MWF via LUE AVF, ascites (requiring repeat paracenteses q4-6wks), NICM with moderate LV dysfunction w/ EF 35-40%(2023) and CAD s/p atherectomy + SALLIE to D1(4/11/2024) presents to [facility] transferred from [facility]. Patient initially presented to [facility] ED with shortness of breath. Of note he was recently admitted from 09/27-12/02 for acute cholecystitis s/p cholecystectomy. In [facility] ED, pt was hypoxic to 86% on RA, trop 1.7K, EKG no new changes, CXR fluid overload. Admitted for AHRF 2/2 fluid overload. Pt received HD on 12/18, 12/19, 12/20 and 12/22. DAPT was resumed, TTE showed improvement in LVEF to 40-45%. Stress test was abnormal with 1mm inferior STD, reversible ischemia in the inferior wall and fixed defects in the lateral, anterior and apical walls with post stress EF of 24%. Pt was then transferred to [facility] for cardiac cath which showed pLAD 70% ISR, mLCx 70%, D1 severe dz. Patient now transferred to [facility] CTS Dr. Rivers for CABG eval    Wound Consult requested to assist w/ management of  Sacral region/BL buttocks DTPI  Rt upper back DTPI    Recommendations:   Sacral region continue Triad BID and PRN soiling  Rt back: continue Cavilon daily       Moisturize intact skin w/ SWEEN cream BID  Nutrition Consult for optimization in pt w/ increased nutritional needs            encourage high quality protein, dedrick/ prosource, MVI & Vit C to promote wound healing  Continue turning and positioning w/ offloading assistive devices as per protocol       Continue w/ attends under pads and Pericare as per protocol  Waffle Cushion to chair when oob to chair  Continue w/ low air loss pressure redistribution bed surface   Pt will need Group 2 mattress on hospital bed and ROHO cushion for wheel chair upon discharge home  Care as per medicine, will follow w/ you  Upon discharge f/u as outpatient at [facility] [address] [facility] [phone]  Seen and D/w team & RN  Thank you for this consult,  Maryam Andrews, LUISITO-BC, [facility]  [phone]  Nights/ Weekends/ Holidays please call:  General Surgery Consult pager ([phone]) for emergencies  Wound PT for multilayer leg wrapping or VAC issues (x [phone])    A/P:  54M with PMH of HTN, HLD, ESRD on HD MWF via LUE AVF, ascites (requiring repeat paracenteses q4-6wks), NICM with moderate LV dysfunction w/ EF 35-40%(2023) and CAD s/p atherectomy + SALLIE to D1(4/11/2024) presents to [facility] transferred from [facility]. Patient initially presented to [facility] ED with shortness of breath. Of note he was recently admitted from 09/27-12/02 for acute cholecystitis s/p cholecystectomy. In [facility] ED, pt was hypoxic to 86% on RA, trop 1.7K, EKG no new changes, CXR fluid overload. Admitted for AHRF 2/2 fluid overload. Pt received HD on 12/18, 12/19, 12/20 and 12/22. DAPT was resumed, TTE showed improvement in LVEF to 40-45%. Stress test was abnormal with 1mm inferior STD, reversible ischemia in the inferior wall and fixed defects in the lateral, anterior and apical walls with post stress EF of 24%. Pt was then transferred to [facility] for cardiac cath which showed pLAD 70% ISR, mLCx 70%, D1 severe dz. Patient now transferred to [facility] CTS Dr. Rivers for CABG eval    Wound Consult requested to assist w/ management of  Sacral region/BL buttocks DTI  Rt upper back DTI    Recommendations:   Sacral region continue Triad BID and PRN soiling  Rt back: continue Cavilon daily       Moisturize intact skin w/ SWEEN cream BID  Nutrition Consult for optimization in pt w/ increased nutritional needs            encourage high quality protein, dedrick/ prosource, MVI & Vit C to promote wound healing  Continue turning and positioning w/ offloading assistive devices as per protocol       Continue w/ attends under pads and Pericare as per protocol  Waffle Cushion to chair when oob to chair  Continue w/ low air loss pressure redistribution bed surface   Pt will need Group 2 mattress on hospital bed and ROHO cushion for wheel chair upon discharge home  Care as per medicine, will follow w/ you  Upon discharge f/u as outpatient at Wound Center [address] [facility] [phone]  Seen and D/w team & RN  Thank you for this consult,  Maryam Andrews, LUISITO-BC, [facility]  [phone]  Nights/ Weekends/ Holidays please call:  General Surgery Consult pager ([phone]) for emergencies  Wound PT for multilayer leg wrapping or VAC issues (x [phone])

## 2025-01-13 NOTE — PROGRESS NOTE ADULT - SUBJECTIVE AND OBJECTIVE BOX
[facility]-- WOUND TEAM -- FOLLOW UP NOTE  --------------------------------------------------------------------------------    24 hour events/subjective:  Afebrile  Last temp 36.5 C  Tolerating po w/o n/v  Chart reviewed  Hx of   54M with PMH of HTN, HLD, ESRD on HD MWF via LUE AVF, ascites (requiring repeat paracenteses q4-6wks), NICM with moderate LV dysfunction w/ EF 35-40%(2023) and CAD s/p atherectomy + SALLIE to D1(4/11/2024) presents to [facility] transferred from [facility]. Patient initially presented to [facility] ED with shortness of breath. Of note he was recently admitted from 09/27-12/02 for acute cholecystitis s/p cholecystectomy. In [facility] ED, pt was hypoxic to 86% on RA, trop 1.7K, EKG no new changes, CXR fluid overload. Admitted for AHRF 2/2 fluid overload. Pt received HD on 12/18, 12/19, 12/20 and 12/22. DAPT was resumed, TTE showed improvement in LVEF to 40-45%. Stress test was abnormal with 1mm inferior STD, reversible ischemia in the inferior wall and fixed defects in the lateral, anterior and apical walls with post stress EF of 24%. Pt was then transferred to [facility] for cardiac cath which showed pLAD 70% ISR, mLCx 70%, D1 severe dz. Patient now transferred to [facility] CTS Dr. Rivers for CABG eval.  All questions answered to the expressed satisfaction of patient   dc planning ongoing        Diet:  Diet, NPO with Tube Feed:   Tube Feeding Modality: Nasogastric  Vital 1.5 Jeremias (VITAL1.5RTH)  Total Volume for 24 Hours (mL): 1440  Continuous  Starting Tube Feed Rate mL per Hour: 20  Increase Tube Feed Rate by (mL): 10  Until Goal Tube Feed Rate (mL per Hour): 60  Tube Feed Duration (in Hours): 24  Tube Feed Start Time: 13:00  No Carb Prosource TF     Qty per Day:  1 (01-07-25 @ 23:29)      ROS: General/ Skin/ Msk/ Neuro/ GI see HPI  all other systems negative      ALLERGIES & MEDICATIONS  --------------------------------------------------------------------------------  Allergies    No Known Allergies    Intolerances          STANDING INPATIENT MEDICATIONS    albuterol/ipratropium for Nebulization 3 milliLiter(s) Nebulizer every 6 hours  aMIOdarone    Tablet   Oral   aMIOdarone    Tablet 200 milliGRAM(s) Oral daily  aspirin  chewable 81 milliGRAM(s) Oral daily  atorvastatin 80 milliGRAM(s) Oral at bedtime  bisacodyl Suppository 10 milliGRAM(s) Rectal once  chlorhexidine 2% Cloths 1 Application(s) Topical daily  chlorhexidine 4% Liquid 1 Application(s) Topical <User Schedule>  dextrose 50% Injectable 50 milliLiter(s) IV Push every 15 minutes  dextrose 50% Injectable 25 milliLiter(s) IV Push every 15 minutes  DOBUTamine Infusion 1 MICROgram(s)/kG/Min IV Continuous <Continuous>  epoetin ignacia (EPOGEN) Injectable 34961 Unit(s) IV Push <User Schedule>  gabapentin 100 milliGRAM(s) Oral every 12 hours  heparin  Infusion 300 Unit(s)/Hr IV Continuous <Continuous>  insulin lispro (ADMELOG) corrective regimen sliding scale   SubCutaneous every 6 hours  insulin regular Infusion 2 Unit(s)/Hr IV Continuous <Continuous>  lidocaine   4% Patch 2 Patch Transdermal daily  meropenem  IVPB 500 milliGRAM(s) IV Intermittent every 24 hours  Nephro-elicia 1 Tablet(s) Oral daily  pantoprazole  Injectable 40 milliGRAM(s) IV Push daily  sodium chloride 0.65% Nasal 1 Spray(s) Both Nostrils every 12 hours  sodium chloride 0.9%. 1000 milliLiter(s) IV Continuous <Continuous>  sodium chloride 3%  Inhalation 4 milliLiter(s) Inhalation every 6 hours  tobramycin for Nebulization 300 milliGRAM(s) Inhalation every 12 hours  traZODone 100 milliGRAM(s) Oral at bedtime  vancomycin    Solution 125 milliGRAM(s) Oral every 12 hours      PRN INPATIENT MEDICATION  lidocaine/prilocaine Cream 1 Application(s) Topical daily PRN  midodrine 10 milliGRAM(s) Oral every 8 hours PRN  sodium chloride 0.9% lock flush 10 milliLiter(s) IV Push every 1 hour PRN        VITALS/PHYSICAL EXAM  --------------------------------------------------------------------------------  T(C): 36.5 (01-13-25 @ 12:35), Max: 37 (01-12-25 @ 20:00)  HR: 87 (01-13-25 @ 14:00) (76 - 87)  BP: 102/56 (01-13-25 @ 08:00) (101/56 - 136/69)  RR: 25 (01-13-25 @ 14:00) (15 - 29)  SpO2: 90% (01-13-25 @ 14:00) (90% - 100%)  Wt(kg): --        01-12-25 @ 07:01  -  01-13-25 @ 07:00  --------------------------------------------------------  IN: 1703.8 mL / OUT: 0 mL / NET: 1703.8 mL    01-13-25 @ 07:01  -  01-13-25 @ 14:53  --------------------------------------------------------  IN: 463.6 mL / OUT: 2000 mL / NET: -1536.4 mL    NAD/ Alert/ in chair/ thin  WD/ WN/Progressa bed  HEENT:  sclera clear, mucosa moist, throat clear, trachea midline, neck supple  Respiratory: nonlabored w/ equal chest rise  Gastrointestinal: soft NT/ND, (+)NGT  : Deferred   Neurology: verbal, can follow commands  Psych: difficult to assess  Musculoskeletal:  no deformities/ contractures  Vascular: BLE equally warm, no cyanosis, clubbing, edema nor acute ischemia  Skin:  moist w/ good turgor   sacral region, BL buttocks deep tissue injury      9.0 cm x 5.5 cm x 0.1 cm. periwound intact, without erythema        scant serosanguinous drainage, there is no increase in warmth         no induration, fluctuance, nor crepidus     Right Upper Back; deep tissue injury 5.5 cm x 3.5 cm x 0.0 cm.       There is no blistering, drainage, no odor, erythema, no increase in warmth           no induration, fluctuance, nor crepitus          LABS/ CULTURES/ RADIOLOGY:                     9.3    21.73 >-----------<  226      [01-13-25 @ 00:26]              28.4     136  |  97  |  52  ----------------------------<  238      [01-13-25 @ 00:26]  4.6   |  22  |  4.59        Ca     8.6     [01-13-25 @ 00:26]      Mg     2.4     [01-13-25 @ 00:26]      Phos  5.0     [01-13-25 @ 00:26]    TPro  6.3  /  Alb  3.2  /  TBili  1.0  /  DBili  x   /  AST  23  /  ALT  36  /  AlkPhos  85  [01-13-25 @ 00:26]    PT/INR: PT 14.4 , INR 1.27       [01-12-25 @ 22:43]  PTT: 46.3       [01-13-25 @ 13:44]      Blood Gas Arterial - Calcium, Ionized: 1.23 mmol/L (01-13-25 @ 14:10)  Blood Gas Arterial - Calcium, Ionized: 1.17 mmol/L (01-12-25 @ 23:53)  Blood Gas Calcium, Ionized - Venous: 1.20 mmol/L (01-12-25 @ 00:00)      CAPILLARY BLOOD GLUCOSE  205 (13 Jan 2025 14:00)  189 (13 Jan 2025 13:00)  135 (13 Jan 2025 12:00)  68 (13 Jan 2025 11:00)  101 (13 Jan 2025 10:00)  109 (13 Jan 2025 09:00)  125 (13 Jan 2025 08:00)  166 (13 Jan 2025 07:00)  244 (13 Jan 2025 06:00)  309 (13 Jan 2025 05:00)  229 (13 Jan 2025 00:00)      POCT Blood Glucose.: 205 mg/dL (13 Jan 2025 14:11)  POCT Blood Glucose.: 189 mg/dL (13 Jan 2025 13:03)  POCT Blood Glucose.: 135 mg/dL (13 Jan 2025 12:17)  POCT Blood Glucose.: 96 mg/dL (13 Jan 2025 11:34)  POCT Blood Glucose.: 68 mg/dL (13 Jan 2025 11:06)  POCT Blood Glucose.: 101 mg/dL (13 Jan 2025 10:04)  POCT Blood Glucose.: 109 mg/dL (13 Jan 2025 08:53)  POCT Blood Glucose.: 125 mg/dL (13 Jan 2025 07:59)  POCT Blood Glucose.: 166 mg/dL (13 Jan 2025 06:45)  POCT Blood Glucose.: 244 mg/dL (13 Jan 2025 05:45)  POCT Blood Glucose.: 309 mg/dL (13 Jan 2025 05:08)  POCT Blood Glucose.: 229 mg/dL (12 Jan 2025 23:48)  POCT Blood Glucose.: 144 mg/dL (12 Jan 2025 17:06)                  Culture - Blood (collected 01-09-25 @ 16:30)  Source: .Blood BLOOD  Preliminary Report (01-12-25 @ 19:01):    No growth at 72 Hours    Culture - Blood (collected 01-07-25 @ 14:08)  Source: .Blood BLOOD  Final Report (01-12-25 @ 18:01):    No growth at 5 days          A1C with Estimated Average Glucose Result: 5.6 % (12-24-24 @ 06:50)           [facility]-- WOUND TEAM -- FOLLOW UP NOTE  --------------------------------------------------------------------------------    24 hour events/subjective:  Afebrile  Last temp 36.5 C  Tolerating po w/o n/v  Chart reviewed  Hx of   54M with PMH of HTN, HLD, ESRD on HD MWF via LUE AVF, ascites (requiring repeat paracenteses q4-6wks), NICM with moderate LV dysfunction w/ EF 35-40%(2023) and CAD s/p atherectomy + SALLIE to D1(4/11/2024) presents to [facility] transferred from [facility]. Patient initially presented to [facility] ED with shortness of breath. Of note he was recently admitted from 09/27-12/02 for acute cholecystitis s/p cholecystectomy. In [facility] ED, pt was hypoxic to 86% on RA, trop 1.7K, EKG no new changes, CXR fluid overload. Admitted for AHRF 2/2 fluid overload. Pt received HD on 12/18, 12/19, 12/20 and 12/22. DAPT was resumed, TTE showed improvement in LVEF to 40-45%. Stress test was abnormal with 1mm inferior STD, reversible ischemia in the inferior wall and fixed defects in the lateral, anterior and apical walls with post stress EF of 24%. Pt was then transferred to [facility] for cardiac cath which showed pLAD 70% ISR, mLCx 70%, D1 severe dz. Patient now transferred to [facility] CTS Dr. Rivers for CABG eval.  All questions answered to the expressed satisfaction of patient   dc planning ongoing        Diet:  Diet, NPO with Tube Feed:   Tube Feeding Modality: Nasogastric  Vital 1.5 Jeremias (VITAL1.5RTH)  Total Volume for 24 Hours (mL): 1440  Continuous  Starting Tube Feed Rate mL per Hour: 20  Increase Tube Feed Rate by (mL): 10  Until Goal Tube Feed Rate (mL per Hour): 60  Tube Feed Duration (in Hours): 24  Tube Feed Start Time: 13:00  No Carb Prosource TF     Qty per Day:  1 (01-07-25 @ 23:29)      ROS: General/ Skin/ Msk/ Neuro/ GI see HPI  all other systems negative      ALLERGIES & MEDICATIONS  --------------------------------------------------------------------------------  Allergies    No Known Allergies    Intolerances          STANDING INPATIENT MEDICATIONS    albuterol/ipratropium for Nebulization 3 milliLiter(s) Nebulizer every 6 hours  aMIOdarone    Tablet   Oral   aMIOdarone    Tablet 200 milliGRAM(s) Oral daily  aspirin  chewable 81 milliGRAM(s) Oral daily  atorvastatin 80 milliGRAM(s) Oral at bedtime  bisacodyl Suppository 10 milliGRAM(s) Rectal once  chlorhexidine 2% Cloths 1 Application(s) Topical daily  chlorhexidine 4% Liquid 1 Application(s) Topical <User Schedule>  dextrose 50% Injectable 50 milliLiter(s) IV Push every 15 minutes  dextrose 50% Injectable 25 milliLiter(s) IV Push every 15 minutes  DOBUTamine Infusion 1 MICROgram(s)/kG/Min IV Continuous <Continuous>  epoetin ignacia (EPOGEN) Injectable 37859 Unit(s) IV Push <User Schedule>  gabapentin 100 milliGRAM(s) Oral every 12 hours  heparin  Infusion 300 Unit(s)/Hr IV Continuous <Continuous>  insulin lispro (ADMELOG) corrective regimen sliding scale   SubCutaneous every 6 hours  insulin regular Infusion 2 Unit(s)/Hr IV Continuous <Continuous>  lidocaine   4% Patch 2 Patch Transdermal daily  meropenem  IVPB 500 milliGRAM(s) IV Intermittent every 24 hours  Nephro-elicia 1 Tablet(s) Oral daily  pantoprazole  Injectable 40 milliGRAM(s) IV Push daily  sodium chloride 0.65% Nasal 1 Spray(s) Both Nostrils every 12 hours  sodium chloride 0.9%. 1000 milliLiter(s) IV Continuous <Continuous>  sodium chloride 3%  Inhalation 4 milliLiter(s) Inhalation every 6 hours  tobramycin for Nebulization 300 milliGRAM(s) Inhalation every 12 hours  traZODone 100 milliGRAM(s) Oral at bedtime  vancomycin    Solution 125 milliGRAM(s) Oral every 12 hours      PRN INPATIENT MEDICATION  lidocaine/prilocaine Cream 1 Application(s) Topical daily PRN  midodrine 10 milliGRAM(s) Oral every 8 hours PRN  sodium chloride 0.9% lock flush 10 milliLiter(s) IV Push every 1 hour PRN        VITALS/PHYSICAL EXAM  --------------------------------------------------------------------------------  T(C): 36.5 (01-13-25 @ 12:35), Max: 37 (01-12-25 @ 20:00)  HR: 87 (01-13-25 @ 14:00) (76 - 87)  BP: 102/56 (01-13-25 @ 08:00) (101/56 - 136/69)  RR: 25 (01-13-25 @ 14:00) (15 - 29)  SpO2: 90% (01-13-25 @ 14:00) (90% - 100%)  Wt(kg): --        01-12-25 @ 07:01  -  01-13-25 @ 07:00  --------------------------------------------------------  IN: 1703.8 mL / OUT: 0 mL / NET: 1703.8 mL    01-13-25 @ 07:01  -  01-13-25 @ 14:53  --------------------------------------------------------  IN: 463.6 mL / OUT: 2000 mL / NET: -1536.4 mL    NAD/ Alert/ in chair/ thin  WD/ WN/Progressa bed  HEENT:  sclera clear, mucosa moist, throat clear, trachea midline, neck supple  Respiratory: nonlabored w/ equal chest rise  Gastrointestinal: soft NT/ND, (+)NGT  : Deferred   Neurology: verbal, can follow commands  Psych: difficult to assess  Musculoskeletal:  no deformities/ contractures  Vascular: BLE equally warm, no cyanosis, clubbing, edema nor acute ischemia  Skin:  moist w/ good turgor   sacral region, BL buttocks deep tissue injury      9.0 cm x 5.5 cm x 0.1 cm. periwound intact, without erythema        scant serosanguinous drainage, there is no increase in warmth         no induration, fluctuance, nor crepitus     Right Upper Back; deep tissue injury 5.5 cm x 3.5 cm x 0.0 cm.       There is no blistering, drainage, no odor, erythema, no increase in warmth           no induration, fluctuance, nor crepitus          LABS/ CULTURES/ RADIOLOGY:                     9.3    21.73 >-----------<  226      [01-13-25 @ 00:26]              28.4     136  |  97  |  52  ----------------------------<  238      [01-13-25 @ 00:26]  4.6   |  22  |  4.59        Ca     8.6     [01-13-25 @ 00:26]      Mg     2.4     [01-13-25 @ 00:26]      Phos  5.0     [01-13-25 @ 00:26]    TPro  6.3  /  Alb  3.2  /  TBili  1.0  /  DBili  x   /  AST  23  /  ALT  36  /  AlkPhos  85  [01-13-25 @ 00:26]    PT/INR: PT 14.4 , INR 1.27       [01-12-25 @ 22:43]  PTT: 46.3       [01-13-25 @ 13:44]      Blood Gas Arterial - Calcium, Ionized: 1.23 mmol/L (01-13-25 @ 14:10)  Blood Gas Arterial - Calcium, Ionized: 1.17 mmol/L (01-12-25 @ 23:53)  Blood Gas Calcium, Ionized - Venous: 1.20 mmol/L (01-12-25 @ 00:00)      CAPILLARY BLOOD GLUCOSE  205 (13 Jan 2025 14:00)  189 (13 Jan 2025 13:00)  135 (13 Jan 2025 12:00)  68 (13 Jan 2025 11:00)  101 (13 Jan 2025 10:00)  109 (13 Jan 2025 09:00)  125 (13 Jan 2025 08:00)  166 (13 Jan 2025 07:00)  244 (13 Jan 2025 06:00)  309 (13 Jan 2025 05:00)  229 (13 Jan 2025 00:00)      POCT Blood Glucose.: 205 mg/dL (13 Jan 2025 14:11)  POCT Blood Glucose.: 189 mg/dL (13 Jan 2025 13:03)  POCT Blood Glucose.: 135 mg/dL (13 Jan 2025 12:17)  POCT Blood Glucose.: 96 mg/dL (13 Jan 2025 11:34)  POCT Blood Glucose.: 68 mg/dL (13 Jan 2025 11:06)  POCT Blood Glucose.: 101 mg/dL (13 Jan 2025 10:04)  POCT Blood Glucose.: 109 mg/dL (13 Jan 2025 08:53)  POCT Blood Glucose.: 125 mg/dL (13 Jan 2025 07:59)  POCT Blood Glucose.: 166 mg/dL (13 Jan 2025 06:45)  POCT Blood Glucose.: 244 mg/dL (13 Jan 2025 05:45)  POCT Blood Glucose.: 309 mg/dL (13 Jan 2025 05:08)  POCT Blood Glucose.: 229 mg/dL (12 Jan 2025 23:48)  POCT Blood Glucose.: 144 mg/dL (12 Jan 2025 17:06)                  Culture - Blood (collected 01-09-25 @ 16:30)  Source: .Blood BLOOD  Preliminary Report (01-12-25 @ 19:01):    No growth at 72 Hours    Culture - Blood (collected 01-07-25 @ 14:08)  Source: .Blood BLOOD  Final Report (01-12-25 @ 18:01):    No growth at 5 days          A1C with Estimated Average Glucose Result: 5.6 % (12-24-24 @ 06:50)

## 2025-01-13 NOTE — PROGRESS NOTE ADULT - ATTENDING COMMENTS
Seen and examined patient with NP/PA.  Independently verified the review of systems, physical examination, labs, radiological imaging. Also discussed with consultants to formulate eventual assessment and plan.      Assessment and plan:   Post CABG surgery for CAD  Recurrent issues with mucous plugging, more on the left side.  Encouraged noninvasive pulmonary hygiene measures.  We will continue to follow closely    Carlos Trivedi MD, MPH   Lung Transplantation  , Pulmonary and Critical care Medicine  [facility]

## 2025-01-13 NOTE — PROGRESS NOTE ADULT - ASSESSMENT
54M with PMH of HTN, HLD, ESRD on HD MWF via LUE AVF, ascites (requiring repeat paracenteses q4-6wks), NICM with moderate LV dysfunction w/ EF 35-40%() and CAD s/p atherectomy + SALLIE to D1(2024) presents to [facility] transferred from [facility]. Patient initially presented to [facility] ED with shortness of breath. Of note he was recently admitted from - for acute cholecystitis s/p cholecystectomy. In [facility] ED, pt was hypoxic to 86% on RA, trop 1.7K, EKG no new changes, CXR fluid overload. Admitted for AHRF 2/2 fluid overload. Pt received HD on , ,  and . DAPT was resumed, TTE showed improvement in LVEF to 40-45%. Stress test was abnormal with 1mm inferior STD, reversible ischemia in the inferior wall and fixed defects in the lateral, anterior and apical walls with post stress EF of 24%.     Pt was then transferred to [facility] for cardiac cath which showed pLAD 70% ISR, mLCx 70%, D1 severe dz.     Patient now transferred to [facility] CTS Dr. Rivers for CABG eval.      # CAD s/p NSTEMI  Hx CAD s/p PCI LAD   Presented with ADHF elevated troponins  Positive NST1-Cath- pLAD 70% ISR, mLCx 70%, D1 severe dz.   TTE EF 35% Severe TRWas on Plavix-p2y12- 321 been off Plavix since -POD#1-C3L free LIMA-LAD; SVG-Diag; PBL-koieRU-Rcubimwcw on  Sinus rhythm,Amio for AF prophylaxis  -OOB off  Sinus rhythm   -POD#3-On /pressors-EF 25-30% RV failure with transaminitis-Sinus Vzleuw92/03-POD#4-Was intubated for hypoxia-gradual hypotension on /pressors had IABP inserted this morning  -POD#5-s/p IABP insertion, sepsis/septic shock due to GNR/ECOLI HD cath placed   Bronched yesterday 1/3 - minimal secretions with rust-tinged sputum     ESBL-E Coli bacteremia on meropenam    - POD#7 Atrial Fib ,remains intubated weaning IABP 1:3,off pressors on CRRT  -IABP removed.Remains on vent-Alex 10ppm,off Levo- CVP 10 / MAP 71 / Hct 25.6% / Lactate 1.7-Atrial Fibrillation  -Intubated on Alex 10ppm, gtt, BP better-AF~~90's on Amio-had bronch still febrile Tmax 101  -Extubated awake alert on HFNC AF,on Dobutrex-CRRT,Cultures no growth    01/10-OOB on BiPAP Sinus Rhythm on -SR/ MAP 57/ CVP 10/  Hct 26.7 / Lact 1.4  -s/p EDU/DCCV-Sinus rhythm on -SR / MAP 52 / CVP 12/ Hct 25.8 / Lact 0.7-had bronch with BAL Left lung  -Sinus rhythm on -for repeat Bronch-SR / MAP 67 / CVP 11 / Hct 27.4% / Lact 0.8          # Acute on Chronic Systolic Heart Failure  EF-35% ,severe TR  Had HD post op  GDMT post op  LVEF improved post bypass but now at 23-30%-BiV dysfunction on  now off pressors  -TTE EF 40%,trace effusion  ECG c/w pericarditis-on colchicine     Vascular evaluated  AVGraft /?steal causing RV failure-'' Very low suspicion of steal Sd and AVF causing current clinical state. ''   VA Duplex Hemodialysis Access, Left (25 @ 13:35) >   Arterial flow volume of 1301 mL/m, and the venous flow volume of  1492 mL/m are consistent with a normally functioning dialysis access  fistula.        # Ascites  Hx chronic ascites  Has drainage q4-6 weeks  Last drained -4600mL  ? ascites related to severe TR  Had gsgqkbbjxqow-Jqmuith-bm growth   CT Abdomen 01/10-Large volume ascites-consider paracentesis  Monitor      # ESRD  Now off CRRT transition to HD

## 2025-01-13 NOTE — PROGRESS NOTE ADULT - SUBJECTIVE AND OBJECTIVE BOX
Pulmonary Consult Follow up     Interval Events:    doing well. using volara without difficulty.    REVIEW OF SYSTEMS:  [x] All other systems negative except per HPI   [ ] Unable to assess ROS because ________    OBJECTIVE:  ICU Vital Signs Last 24 Hrs  T(C): 37.1 (13 Jan 2025 16:00), Max: 37.1 (13 Jan 2025 16:00)  T(F): 98.8 (13 Jan 2025 16:00), Max: 98.8 (13 Jan 2025 16:00)  HR: 88 (13 Jan 2025 17:00) (77 - 88)  BP: 102/56 (13 Jan 2025 08:00) (101/56 - 136/69)  BP(mean): 76 (13 Jan 2025 08:00) (74 - 95)  ABP: 142/48 (13 Jan 2025 17:00) (130/40 - 203/49)  ABP(mean): 67 (13 Jan 2025 17:00) (59 - 84)  RR: 20 (13 Jan 2025 17:00) (15 - 29)  SpO2: 92% (13 Jan 2025 17:00) (92% - 100%)    O2 Parameters below as of 13 Jan 2025 16:00  Patient On (Oxygen Delivery Method): nasal cannula  O2 Flow (L/min): 3            01-12 @ 07:01 - 01-13 @ 07:00  --------------------------------------------------------  IN: 1703.8 mL / OUT: 0 mL / NET: 1703.8 mL    01-13 @ 07:01  -  01-13 @ 17:17  --------------------------------------------------------  IN: 662.5 mL / OUT: 2000 mL / NET: -1337.5 mL        PHYSICAL EXAM:  GENERAL: NAD  ENMT: Moist mucous membranes  CHEST/LUNG: using volara comfortably.   HEART: Regular rate and rhythm;  ABDOMEN: Nondistended  VASCULAR: No  cyanosis, or edema  SKIN: No rashes or lesions  NERVOUS SYSTEM:  Alert & Oriented X3, Good concentration    HOSPITAL MEDICATIONS:  MEDICATIONS  (STANDING):  albuterol/ipratropium for Nebulization 3 milliLiter(s) Nebulizer every 6 hours  aMIOdarone    Tablet   Oral   aMIOdarone    Tablet 200 milliGRAM(s) Oral daily  aspirin  chewable 81 milliGRAM(s) Oral daily  atorvastatin 80 milliGRAM(s) Oral at bedtime  bisacodyl Suppository 10 milliGRAM(s) Rectal once  chlorhexidine 2% Cloths 1 Application(s) Topical daily  chlorhexidine 4% Liquid 1 Application(s) Topical <User Schedule>  dextrose 50% Injectable 50 milliLiter(s) IV Push every 15 minutes  dextrose 50% Injectable 25 milliLiter(s) IV Push every 15 minutes  DOBUTamine Infusion 1 MICROgram(s)/kG/Min (1.28 mL/Hr) IV Continuous <Continuous>  epoetin ignacia (EPOGEN) Injectable 47767 Unit(s) IV Push <User Schedule>  gabapentin 100 milliGRAM(s) Oral every 12 hours  heparin  Infusion 300 Unit(s)/Hr (13 mL/Hr) IV Continuous <Continuous>  insulin lispro (ADMELOG) corrective regimen sliding scale   SubCutaneous every 6 hours  insulin regular Infusion 2 Unit(s)/Hr (2 mL/Hr) IV Continuous <Continuous>  lidocaine   4% Patch 2 Patch Transdermal daily  meropenem  IVPB 500 milliGRAM(s) IV Intermittent every 24 hours  Nephro-elicia 1 Tablet(s) Oral daily  pantoprazole  Injectable 40 milliGRAM(s) IV Push daily  sodium chloride 0.65% Nasal 1 Spray(s) Both Nostrils every 12 hours  sodium chloride 0.9%. 1000 milliLiter(s) (10 mL/Hr) IV Continuous <Continuous>  sodium chloride 3%  Inhalation 4 milliLiter(s) Inhalation every 6 hours  tobramycin for Nebulization 300 milliGRAM(s) Inhalation every 12 hours  traZODone 100 milliGRAM(s) Oral at bedtime  vancomycin    Solution 125 milliGRAM(s) Oral every 12 hours    MEDICATIONS  (PRN):  lidocaine/prilocaine Cream 1 Application(s) Topical daily PRN pre-HD  midodrine 10 milliGRAM(s) Oral every 8 hours PRN pre-HD  sodium chloride 0.9% lock flush 10 milliLiter(s) IV Push every 1 hour PRN Pre/post blood products, medications, blood draw, and to maintain line patency      LABS:  01-13    136  |  97  |  52[H]  ----------------------------<  238[H]  4.6   |  22  |  4.59[H]  01-12    136  |  99  |  37[H]  ----------------------------<  142[H]  3.9   |  23  |  3.21[H]  01-11    132[L]  |  100  |  56[H]  ----------------------------<  173[H]  4.3   |  18[L]  |  4.10[H]    Ca    8.6      13 Jan 2025 00:26  Ca    8.4      12 Jan 2025 00:30  Ca    8.4      11 Jan 2025 00:32  Phos  5.0     01-13  Mg     2.4     01-13    TPro  6.3  /  Alb  3.2[L]  /  TBili  1.0  /  DBili  x   /  AST  23  /  ALT  36  /  AlkPhos  85  01-13  TPro  5.9[L]  /  Alb  3.0[L]  /  TBili  1.3[H]  /  DBili  x   /  AST  24  /  ALT  48[H]  /  AlkPhos  91  01-12  TPro  6.0  /  Alb  2.9[L]  /  TBili  1.0  /  DBili  x   /  AST  40  /  ALT  88[H]  /  AlkPhos  102  01-11    Magnesium: 2.4 mg/dL (01-13-25 @ 00:26)  Magnesium: 2.2 mg/dL (01-12-25 @ 00:30)  Magnesium: 2.6 mg/dL (01-11-25 @ 00:32)    Phosphorus: 5.0 mg/dL (01-13-25 @ 00:26)  Phosphorus: 3.9 mg/dL (01-12-25 @ 00:30)  Phosphorus: 4.4 mg/dL (01-11-25 @ 00:32)      PT/INR - ( 12 Jan 2025 22:43 )   PT: 14.4 sec;   INR: 1.27 ratio         PTT - ( 13 Jan 2025 13:44 )  PTT:46.3 sec              Urinalysis Basic - ( 13 Jan 2025 00:26 )    Color: x / Appearance: x / SG: x / pH: x  Gluc: 238 mg/dL / Ketone: x  / Bili: x / Urobili: x   Blood: x / Protein: x / Nitrite: x   Leuk Esterase: x / RBC: x / WBC x   Sq Epi: x / Non Sq Epi: x / Bacteria: x      ABG - ( 13 Jan 2025 14:10 )  pH, Arterial: 7.50  pH, Blood: x     /  pCO2: 35    /  pO2: 80    / HCO3: 27    / Base Excess: 4.1   /  SaO2: 98.2                                    9.3    21.73 )-----------( 226      ( 13 Jan 2025 00:26 )             28.4                         8.8    21.67 )-----------( 198      ( 12 Jan 2025 00:30 )             27.4                         8.1    29.61 )-----------( 164      ( 11 Jan 2025 00:32 )             25.8     CAPILLARY BLOOD GLUCOSE  130 (13 Jan 2025 17:00)  175 (13 Jan 2025 16:00)  196 (13 Jan 2025 15:00)  205 (13 Jan 2025 14:00)  189 (13 Jan 2025 13:00)  135 (13 Jan 2025 12:00)  68 (13 Jan 2025 11:00)  101 (13 Jan 2025 10:00)  109 (13 Jan 2025 09:00)  125 (13 Jan 2025 08:00)  166 (13 Jan 2025 07:00)  244 (13 Jan 2025 06:00)  309 (13 Jan 2025 05:00)  229 (13 Jan 2025 00:00)      POCT Blood Glucose.: 133 mg/dL (13 Jan 2025 17:03)  POCT Blood Glucose.: 175 mg/dL (13 Jan 2025 15:55)  POCT Blood Glucose.: 196 mg/dL (13 Jan 2025 15:31)  POCT Blood Glucose.: 205 mg/dL (13 Jan 2025 14:11)  POCT Blood Glucose.: 189 mg/dL (13 Jan 2025 13:03)  POCT Blood Glucose.: 135 mg/dL (13 Jan 2025 12:17)  POCT Blood Glucose.: 96 mg/dL (13 Jan 2025 11:34)  POCT Blood Glucose.: 68 mg/dL (13 Jan 2025 11:06)  POCT Blood Glucose.: 101 mg/dL (13 Jan 2025 10:04)  POCT Blood Glucose.: 109 mg/dL (13 Jan 2025 08:53)  POCT Blood Glucose.: 125 mg/dL (13 Jan 2025 07:59)  POCT Blood Glucose.: 166 mg/dL (13 Jan 2025 06:45)  POCT Blood Glucose.: 244 mg/dL (13 Jan 2025 05:45)  POCT Blood Glucose.: 309 mg/dL (13 Jan 2025 05:08)  POCT Blood Glucose.: 229 mg/dL (12 Jan 2025 23:48)    Blood Gas Source Venous: Venous (01-12-25 @ 00:00)  Blood Gas Source Venous: Venous (01-11-25 @ 12:20)

## 2025-01-13 NOTE — SWALLOW FEES ASSESSMENT ADULT - COMMENTS
reduced movement of left vocal cord; right mobile with contact  pooling and penetration of frothy secretions ESRD on dialysis  Multi-CAD s/p C3L on 12/30/24  Post operatively, patient developed worsening cardiogenic shock requiring inotropic support and an IABP; reintubated 1/02  Per ID, pt hypotensive, febrile and reintubated; Pt pancultured. and started on zosyn- meropenem and gent; pt found to have Gram negative bacteremia;   E coli +ESBL bacteremia  1/8: extubated to 50/50 HFNC  1/11: Findings: thick mucoid secretion in Left bronchus, poor cough effort; ...Airway evaluation revealed Sharp Abena. AUGUSTO and LLL evaluation revealed inflamed airway, thick mucoid secretion. RUL, RML, RLL revealed minimal secretion. .. Post xray shows improved aeration of Left lung.    Seen for initial bedside swallow evaluation 1/11/25 with c/f pharyngeal dysphagia and mild dysphonia. NPO, with non-oral nutrition/hydration/medications

## 2025-01-13 NOTE — PROGRESS NOTE ADULT - SUBJECTIVE AND OBJECTIVE BOX
PATIENT SEEN AND EXAMINED BY JEAN PAUL GABRIEL M.D. ON :- 1/16/25  DATE OF SERVICE: 1/16/25            Interim events noted,Labs ,Radiological studies and Cardiology tests reviewed .      Patient is a 54y old  Male who presents with a chief complaint of CAD s/p CABG (12 Jan 2025 09:14)      HPI:  54M with PMH of HTN, HLD, ESRD on HD MWF via LUE AVF, ascites (requiring repeat paracenteses q4-6wks), NICM with moderate LV dysfunction w/ EF 35-40%(2023) and CAD s/p atherectomy + SALILE to D1(4/11/2024) presents to [facility] transferred from [facility]. Patient initially presented to [facility] ED with shortness of breath. Of note he was recently admitted from 09/27-12/02 for acute cholecystitis s/p cholecystectomy. In [facility] ED, pt was hypoxic to 86% on RA, trop 1.7K, EKG no new changes, CXR fluid overload. Admitted for AHRF 2/2 fluid overload. Pt received HD on 12/18, 12/19, 12/20 and 12/22. DAPT was resumed, TTE showed improvement in LVEF to 40-45%. Stress test was abnormal with 1mm inferior STD, reversible ischemia in the inferior wall and fixed defects in the lateral, anterior and apical walls with post stress EF of 24%. Pt was then transferred to [facility] for cardiac cath which showed pLAD 70% ISR, mLCx 70%, D1 severe dz. Patient now transferred to [facility] CTS Dr. Rivers for CABG eval. Patient denies any current SOB or chest pain on admission. Otherwise denies headaches, abdominal pain, urinary or bowel changes, fevers, chills, N/V/D, sick contacts.  (24 Dec 2024 05:07)      PAST MEDICAL & SURGICAL HISTORY:  Type 2 diabetes mellitus      Hypertension      End stage renal disease  started HD 2/2019 T, Th, Sat via right chest permacath      Bone spur  right shoulder- hx of - sx done      Anemia      Injury of right wrist  hx of at age 14      History of hyperkalemia  before HD- K-6.2 - to repeat in am of sx, pt had HD today      S/P arthroscopy of right shoulder  2010      History of vascular access device  right chest permacath - 2/2019      H/O right wrist surgery  tendons repair - at age 14      AV fistula      H/O ventral hernia repair      S/P cholecystectomy          PREVIOUS DIAGNOSTIC TESTING:      ECHO  FINDINGS:    STRESS  FINDINGS:    CATHETERIZATION  FINDINGS:    MEDICATIONS  (STANDING):  albuterol/ipratropium for Nebulization 3 milliLiter(s) Nebulizer every 6 hours  aMIOdarone    Tablet   Oral   aMIOdarone    Tablet 200 milliGRAM(s) Oral daily  aspirin  chewable 81 milliGRAM(s) Oral daily  atorvastatin 80 milliGRAM(s) Oral at bedtime  bisacodyl Suppository 10 milliGRAM(s) Rectal once  chlorhexidine 2% Cloths 1 Application(s) Topical daily  chlorhexidine 4% Liquid 1 Application(s) Topical <User Schedule>  dextrose 50% Injectable 50 milliLiter(s) IV Push every 15 minutes  dextrose 50% Injectable 25 milliLiter(s) IV Push every 15 minutes  DOBUTamine Infusion 1 MICROgram(s)/kG/Min (1.28 mL/Hr) IV Continuous <Continuous>  epoetin ignacia (EPOGEN) Injectable 86415 Unit(s) IV Push <User Schedule>  gabapentin 100 milliGRAM(s) Oral every 12 hours  heparin  Infusion 300 Unit(s)/Hr (13 mL/Hr) IV Continuous <Continuous>  insulin lispro (ADMELOG) corrective regimen sliding scale   SubCutaneous every 6 hours  insulin regular Infusion 2 Unit(s)/Hr (2 mL/Hr) IV Continuous <Continuous>  lidocaine   4% Patch 2 Patch Transdermal daily  meropenem  IVPB 500 milliGRAM(s) IV Intermittent every 24 hours  Nephro-elicia 1 Tablet(s) Oral daily  pantoprazole  Injectable 40 milliGRAM(s) IV Push daily  sodium chloride 0.65% Nasal 1 Spray(s) Both Nostrils every 12 hours  sodium chloride 0.9%. 1000 milliLiter(s) (10 mL/Hr) IV Continuous <Continuous>  sodium chloride 3%  Inhalation 4 milliLiter(s) Inhalation every 6 hours  tobramycin for Nebulization 300 milliGRAM(s) Inhalation every 12 hours  traZODone 100 milliGRAM(s) Oral at bedtime  vancomycin    Solution 125 milliGRAM(s) Oral every 12 hours    MEDICATIONS  (PRN):  benzocaine 20% Spray 1 Spray(s) Topical every 6 hours PRN throat pain  lidocaine/prilocaine Cream 1 Application(s) Topical daily PRN pre-HD  midodrine 10 milliGRAM(s) Oral every 8 hours PRN pre-HD  sodium chloride 0.9% lock flush 10 milliLiter(s) IV Push every 1 hour PRN Pre/post blood products, medications, blood draw, and to maintain line patency      FAMILY HISTORY:  FH: hypertension  mother, father- alive    FH: type 2 diabetes  mother, father- alive        SOCIAL HISTORY:    CIGARETTES:    ALCOHOL:    REVIEW OF SYSTEMS:  CONSTITUTIONAL: No fever, weight loss, or fatigue  EYES: No eye pain, visual disturbances, or discharge  ENMT:  No difficulty hearing, tinnitus, vertigo; No sinus or throat pain  NECK: No pain or stiffness  RESPIRATORY: No cough, wheezing, chills or hemoptysis; No shortness of breath  CARDIOVASCULAR: No chest pain, palpitations, dizziness, or leg swelling  GASTROINTESTINAL: No abdominal or epigastric pain. No nausea, vomiting, or hematemesis; No diarrhea or constipation. No melena or hematochezia.  GENITOURINARY: No dysuria, frequency, hematuria, or incontinence  NEUROLOGICAL: No headaches, memory loss, loss of strength, numbness, or tremors  SKIN: No itching, burning, rashes, or lesions   LYMPH NODES: No enlarged glands  ENDOCRINE: No heat or cold intolerance; No hair loss  MUSCULOSKELETAL: No joint pain or swelling; No muscle, back, or extremity pain  PSYCHIATRIC: No depression, anxiety, mood swings, or difficulty sleeping  HEME/LYMPH: No easy bruising, or bleeding gums  ALLERY AND IMMUNOLOGIC: No hives or eczema    Vital Signs Last 24 Hrs  T(C): 37.1 (13 Jan 2025 20:00), Max: 37.1 (13 Jan 2025 16:00)  T(F): 98.7 (13 Jan 2025 20:00), Max: 98.8 (13 Jan 2025 16:00)  HR: 85 (13 Jan 2025 21:00) (78 - 88)  BP: 102/56 (13 Jan 2025 08:00) (101/56 - 132/69)  BP(mean): 76 (13 Jan 2025 08:00) (74 - 94)  RR: 20 (13 Jan 2025 21:00) (15 - 29)  SpO2: 100% (13 Jan 2025 21:00) (92% - 100%)    Parameters below as of 13 Jan 2025 20:00    O2 Flow (L/min): 3  O2 Concentration (%): 32      PHYSICAL EXAM:  GENERAL: NAD, well-groomed, well-developed  HEAD:  Atraumatic, Normocephalic  EYES: EOMI, PERRLA, conjunctiva and sclera clear  ENMT: No tonsillar erythema, exudates, or enlargement; Moist mucous membranes, Good dentition, No lesions  NECK: Supple, No JVD, Normal thyroid  NERVOUS SYSTEM:  Alert & Oriented X3, Good concentration; Motor Strength 5/5 B/L upper and lower extremities; DTRs 2+ intact and symmetric  CHEST/LUNG: Clear to percussion bilaterally; No rales, rhonchi, wheezing, or rubs  HEART: Regular rate and rhythm; No murmurs, rubs, or gallops  ABDOMEN: Soft, Nontender, Nondistended; Bowel sounds present  EXTREMITIES:  2+ Peripheral Pulses, No clubbing, cyanosis, or edema  LYMPH: No lymphadenopathy noted  SKIN: No rashes or lesions      INTERPRETATION OF TELEMETRY:    ECG:    CHADSVASC:     LABS:                        9.3    21.73 )-----------( 226      ( 13 Jan 2025 00:26 )             28.4     01-13    136  |  97  |  52[H]  ----------------------------<  238[H]  4.6   |  22  |  4.59[H]    Ca    8.6      13 Jan 2025 00:26  Phos  5.0     01-13  Mg     2.4     01-13    TPro  6.3  /  Alb  3.2[L]  /  TBili  1.0  /  DBili  x   /  AST  23  /  ALT  36  /  AlkPhos  85  01-13        PT/INR - ( 12 Jan 2025 22:43 )   PT: 14.4 sec;   INR: 1.27 ratio         PTT - ( 13 Jan 2025 13:44 )  PTT:46.3 sec  Urinalysis Basic - ( 13 Jan 2025 00:26 )    Color: x / Appearance: x / SG: x / pH: x  Gluc: 238 mg/dL / Ketone: x  / Bili: x / Urobili: x   Blood: x / Protein: x / Nitrite: x   Leuk Esterase: x / RBC: x / WBC x   Sq Epi: x / Non Sq Epi: x / Bacteria: x      Lipid Panel:   I&O's Summary    12 Jan 2025 07:01  -  13 Jan 2025 07:00  --------------------------------------------------------  IN: 1703.8 mL / OUT: 0 mL / NET: 1703.8 mL    13 Jan 2025 07:01  -  13 Jan 2025 22:28  --------------------------------------------------------  IN: 1060 mL / OUT: 2000 mL / NET: -940 mL        RADIOLOGY & ADDITIONAL STUDIES:    < from: EDU W or WO Ultrasound Enhancing Agent (01.10.25 @ 11:57) >  CONCLUSIONS:      1. Bedside Limited EDU in CTU. No gastric Images obtained.   2. No evidence of left atrial or left atrial appendage thrombus.   3. Left ventricular systolic function is severely decreased.   4. Enlarged right ventricular cavity size and reduced right ventricular systolic function.   5. Tricuspid annular dilation. Moderate tricuspid regurgitation.   6. No obvious echocardiographic evidence of vegetations.    < end of copied text >   PATIENT SEEN AND EXAMINED BY JEAN PAUL GABRIEL M.D. ON :- 1/13/25  DATE OF SERVICE: 1/13/25            Interim events noted,Labs ,Radiological studies and Cardiology tests reviewed .      Patient is a 54y old  Male who presents with a chief complaint of CAD s/p CABG (12 Jan 2025 09:14)      HPI:  54M with PMH of HTN, HLD, ESRD on HD MWF via LUE AVF, ascites (requiring repeat paracenteses q4-6wks), NICM with moderate LV dysfunction w/ EF 35-40%(2023) and CAD s/p atherectomy + SALLIE to D1(4/11/2024) presents to [facility] transferred from [facility]. Patient initially presented to [facility] ED with shortness of breath. Of note he was recently admitted from 09/27-12/02 for acute cholecystitis s/p cholecystectomy. In [facility] ED, pt was hypoxic to 86% on RA, trop 1.7K, EKG no new changes, CXR fluid overload. Admitted for AHRF 2/2 fluid overload. Pt received HD on 12/18, 12/19, 12/20 and 12/22. DAPT was resumed, TTE showed improvement in LVEF to 40-45%. Stress test was abnormal with 1mm inferior STD, reversible ischemia in the inferior wall and fixed defects in the lateral, anterior and apical walls with post stress EF of 24%. Pt was then transferred to [facility] for cardiac cath which showed pLAD 70% ISR, mLCx 70%, D1 severe dz. Patient now transferred to [facility] CTS Dr. Rivers for CABG eval. Patient denies any current SOB or chest pain on admission. Otherwise denies headaches, abdominal pain, urinary or bowel changes, fevers, chills, N/V/D, sick contacts.  (24 Dec 2024 05:07)      PAST MEDICAL & SURGICAL HISTORY:  Type 2 diabetes mellitus      Hypertension      End stage renal disease  started HD 2/2019 T, Th, Sat via right chest permacath      Bone spur  right shoulder- hx of - sx done      Anemia      Injury of right wrist  hx of at age 14      History of hyperkalemia  before HD- K-6.2 - to repeat in am of sx, pt had HD today      S/P arthroscopy of right shoulder  2010      History of vascular access device  right chest permacath - 2/2019      H/O right wrist surgery  tendons repair - at age 14      AV fistula      H/O ventral hernia repair      S/P cholecystectomy          PREVIOUS DIAGNOSTIC TESTING:      ECHO  FINDINGS:    STRESS  FINDINGS:    CATHETERIZATION  FINDINGS:    MEDICATIONS  (STANDING):  albuterol/ipratropium for Nebulization 3 milliLiter(s) Nebulizer every 6 hours  aMIOdarone    Tablet   Oral   aMIOdarone    Tablet 200 milliGRAM(s) Oral daily  aspirin  chewable 81 milliGRAM(s) Oral daily  atorvastatin 80 milliGRAM(s) Oral at bedtime  bisacodyl Suppository 10 milliGRAM(s) Rectal once  chlorhexidine 2% Cloths 1 Application(s) Topical daily  chlorhexidine 4% Liquid 1 Application(s) Topical <User Schedule>  dextrose 50% Injectable 50 milliLiter(s) IV Push every 15 minutes  dextrose 50% Injectable 25 milliLiter(s) IV Push every 15 minutes  DOBUTamine Infusion 1 MICROgram(s)/kG/Min (1.28 mL/Hr) IV Continuous <Continuous>  epoetin ignacia (EPOGEN) Injectable 05663 Unit(s) IV Push <User Schedule>  gabapentin 100 milliGRAM(s) Oral every 12 hours  heparin  Infusion 300 Unit(s)/Hr (13 mL/Hr) IV Continuous <Continuous>  insulin lispro (ADMELOG) corrective regimen sliding scale   SubCutaneous every 6 hours  insulin regular Infusion 2 Unit(s)/Hr (2 mL/Hr) IV Continuous <Continuous>  lidocaine   4% Patch 2 Patch Transdermal daily  meropenem  IVPB 500 milliGRAM(s) IV Intermittent every 24 hours  Nephro-elicia 1 Tablet(s) Oral daily  pantoprazole  Injectable 40 milliGRAM(s) IV Push daily  sodium chloride 0.65% Nasal 1 Spray(s) Both Nostrils every 12 hours  sodium chloride 0.9%. 1000 milliLiter(s) (10 mL/Hr) IV Continuous <Continuous>  sodium chloride 3%  Inhalation 4 milliLiter(s) Inhalation every 6 hours  tobramycin for Nebulization 300 milliGRAM(s) Inhalation every 12 hours  traZODone 100 milliGRAM(s) Oral at bedtime  vancomycin    Solution 125 milliGRAM(s) Oral every 12 hours    MEDICATIONS  (PRN):  benzocaine 20% Spray 1 Spray(s) Topical every 6 hours PRN throat pain  lidocaine/prilocaine Cream 1 Application(s) Topical daily PRN pre-HD  midodrine 10 milliGRAM(s) Oral every 8 hours PRN pre-HD  sodium chloride 0.9% lock flush 10 milliLiter(s) IV Push every 1 hour PRN Pre/post blood products, medications, blood draw, and to maintain line patency      FAMILY HISTORY:  FH: hypertension  mother, father- alive    FH: type 2 diabetes  mother, father- alive        SOCIAL HISTORY:    CIGARETTES:    ALCOHOL:    REVIEW OF SYSTEMS:  CONSTITUTIONAL: No fever, weight loss, or fatigue  EYES: No eye pain, visual disturbances, or discharge  ENMT:  No difficulty hearing, tinnitus, vertigo; No sinus or throat pain  NECK: No pain or stiffness  RESPIRATORY: No cough, wheezing, chills or hemoptysis; No shortness of breath  CARDIOVASCULAR: No chest pain, palpitations, dizziness, or leg swelling  GASTROINTESTINAL: No abdominal or epigastric pain. No nausea, vomiting, or hematemesis; No diarrhea or constipation. No melena or hematochezia.  GENITOURINARY: No dysuria, frequency, hematuria, or incontinence  NEUROLOGICAL: No headaches, memory loss, loss of strength, numbness, or tremors  SKIN: No itching, burning, rashes, or lesions   LYMPH NODES: No enlarged glands  ENDOCRINE: No heat or cold intolerance; No hair loss  MUSCULOSKELETAL: No joint pain or swelling; No muscle, back, or extremity pain  PSYCHIATRIC: No depression, anxiety, mood swings, or difficulty sleeping  HEME/LYMPH: No easy bruising, or bleeding gums  ALLERY AND IMMUNOLOGIC: No hives or eczema    Vital Signs Last 24 Hrs  T(C): 37.1 (13 Jan 2025 20:00), Max: 37.1 (13 Jan 2025 16:00)  T(F): 98.7 (13 Jan 2025 20:00), Max: 98.8 (13 Jan 2025 16:00)  HR: 85 (13 Jan 2025 21:00) (78 - 88)  BP: 102/56 (13 Jan 2025 08:00) (101/56 - 132/69)  BP(mean): 76 (13 Jan 2025 08:00) (74 - 94)  RR: 20 (13 Jan 2025 21:00) (15 - 29)  SpO2: 100% (13 Jan 2025 21:00) (92% - 100%)    Parameters below as of 13 Jan 2025 20:00    O2 Flow (L/min): 3  O2 Concentration (%): 32      PHYSICAL EXAM:  GENERAL: NAD, well-groomed, well-developed  HEAD:  Atraumatic, Normocephalic  EYES: EOMI, PERRLA, conjunctiva and sclera clear  ENMT: No tonsillar erythema, exudates, or enlargement; Moist mucous membranes, Good dentition, No lesions  NECK: Supple, No JVD, Normal thyroid  NERVOUS SYSTEM:  Alert & Oriented X3, Good concentration; Motor Strength 5/5 B/L upper and lower extremities; DTRs 2+ intact and symmetric  CHEST/LUNG: Clear to percussion bilaterally; No rales, rhonchi, wheezing, or rubs  HEART: Regular rate and rhythm; No murmurs, rubs, or gallops  ABDOMEN: Soft, Nontender, Nondistended; Bowel sounds present  EXTREMITIES:  2+ Peripheral Pulses, No clubbing, cyanosis, or edema  LYMPH: No lymphadenopathy noted  SKIN: No rashes or lesions      INTERPRETATION OF TELEMETRY:    ECG:    CHADSVASC:     LABS:                        9.3    21.73 )-----------( 226      ( 13 Jan 2025 00:26 )             28.4     01-13    136  |  97  |  52[H]  ----------------------------<  238[H]  4.6   |  22  |  4.59[H]    Ca    8.6      13 Jan 2025 00:26  Phos  5.0     01-13  Mg     2.4     01-13    TPro  6.3  /  Alb  3.2[L]  /  TBili  1.0  /  DBili  x   /  AST  23  /  ALT  36  /  AlkPhos  85  01-13        PT/INR - ( 12 Jan 2025 22:43 )   PT: 14.4 sec;   INR: 1.27 ratio         PTT - ( 13 Jan 2025 13:44 )  PTT:46.3 sec  Urinalysis Basic - ( 13 Jan 2025 00:26 )    Color: x / Appearance: x / SG: x / pH: x  Gluc: 238 mg/dL / Ketone: x  / Bili: x / Urobili: x   Blood: x / Protein: x / Nitrite: x   Leuk Esterase: x / RBC: x / WBC x   Sq Epi: x / Non Sq Epi: x / Bacteria: x      Lipid Panel:   I&O's Summary    12 Jan 2025 07:01  -  13 Jan 2025 07:00  --------------------------------------------------------  IN: 1703.8 mL / OUT: 0 mL / NET: 1703.8 mL    13 Jan 2025 07:01  -  13 Jan 2025 22:28  --------------------------------------------------------  IN: 1060 mL / OUT: 2000 mL / NET: -940 mL        RADIOLOGY & ADDITIONAL STUDIES:    < from: EDU W or WO Ultrasound Enhancing Agent (01.10.25 @ 11:57) >  CONCLUSIONS:      1. Bedside Limited EDU in CTU. No gastric Images obtained.   2. No evidence of left atrial or left atrial appendage thrombus.   3. Left ventricular systolic function is severely decreased.   4. Enlarged right ventricular cavity size and reduced right ventricular systolic function.   5. Tricuspid annular dilation. Moderate tricuspid regurgitation.   6. No obvious echocardiographic evidence of vegetations.    < end of copied text >

## 2025-01-13 NOTE — PROGRESS NOTE ADULT - SUBJECTIVE AND OBJECTIVE BOX
Patient is a 54y old  Male who presents with a chief complaint of CAD s/p CABG (12 Jan 2025 09:14)    Being followed by ID for        Interval history:  No other acute events      ROS:  No cough,SOB,CP  No N/V/D  No abd pain  No urinary complaints  No HA  No joint or limb pain  No other complaints    PAST MEDICAL & SURGICAL HISTORY:  Type 2 diabetes mellitus      Hypertension      End stage renal disease  started HD 2/2019 T, Th, Sat via right chest permacath      Bone spur  right shoulder- hx of - sx done      Anemia      Injury of right wrist  hx of at age 14      History of hyperkalemia  before HD- K-6.2 - to repeat in am of sx, pt had HD today      S/P arthroscopy of right shoulder  2010      History of vascular access device  right chest permacath - 2/2019      H/O right wrist surgery  tendons repair - at age 14      AV fistula      H/O ventral hernia repair      S/P cholecystectomy        Allergies    No Known Allergies    Intolerances      Antimicrobials:    meropenem  IVPB 500 milliGRAM(s) IV Intermittent every 24 hours  tobramycin for Nebulization 300 milliGRAM(s) Inhalation every 12 hours  vancomycin    Solution 125 milliGRAM(s) Oral every 12 hours    MEDICATIONS  (STANDING):  albuterol/ipratropium for Nebulization 3 milliLiter(s) Nebulizer every 6 hours  aMIOdarone    Tablet   Oral   aMIOdarone    Tablet 200 milliGRAM(s) Oral daily  aspirin  chewable 81 milliGRAM(s) Oral daily  bisacodyl Suppository 10 milliGRAM(s) Rectal once  chlorhexidine 2% Cloths 1 Application(s) Topical daily  chlorhexidine 4% Liquid 1 Application(s) Topical <User Schedule>  dextrose 50% Injectable 50 milliLiter(s) IV Push every 15 minutes  dextrose 50% Injectable 25 milliLiter(s) IV Push every 15 minutes  DOBUTamine Infusion 2 MICROgram(s)/kG/Min (2.55 mL/Hr) IV Continuous <Continuous>  epoetin ignacia (EPOGEN) Injectable 29924 Unit(s) IV Push <User Schedule>  gabapentin 100 milliGRAM(s) Oral every 12 hours  heparin  Infusion 300 Unit(s)/Hr (13 mL/Hr) IV Continuous <Continuous>  insulin lispro (ADMELOG) corrective regimen sliding scale   SubCutaneous every 6 hours  insulin regular Infusion 2 Unit(s)/Hr (2 mL/Hr) IV Continuous <Continuous>  lidocaine   4% Patch 2 Patch Transdermal daily  meropenem  IVPB 500 milliGRAM(s) IV Intermittent every 24 hours  Nephro-elicia 1 Tablet(s) Oral daily  pantoprazole  Injectable 40 milliGRAM(s) IV Push daily  sodium chloride 0.9%. 1000 milliLiter(s) (10 mL/Hr) IV Continuous <Continuous>  sodium chloride 3%  Inhalation 4 milliLiter(s) Inhalation every 6 hours  tobramycin for Nebulization 300 milliGRAM(s) Inhalation every 12 hours  traZODone 100 milliGRAM(s) Oral at bedtime  vancomycin    Solution 125 milliGRAM(s) Oral every 12 hours      Vital Signs Last 24 Hrs  T(C): 36.4 (01-13-25 @ 08:00), Max: 37.2 (01-12-25 @ 12:00)  T(F): 97.6 (01-13-25 @ 08:00), Max: 99 (01-12-25 @ 12:00)  HR: 78 (01-13-25 @ 09:00) (72 - 86)  BP: 102/56 (01-13-25 @ 08:00) (101/56 - 136/69)  BP(mean): 76 (01-13-25 @ 08:00) (74 - 95)  RR: 29 (01-13-25 @ 09:00) (15 - 29)  SpO2: 92% (01-13-25 @ 09:00) (92% - 100%)    Physical Exam:    Constitutional well preserved,comfortable,pleasant    HEENT PERRLA EOMI,No pallor or icterus    No oral exudate or erythema    Neck supple no JVD or LN    Chest Good AE,CTA    CVS RRR S1 S2 WNl No murmur or rub or gallop    Abd soft BS normal No tenderness no masses    Ext No cyanosis clubbing or edema    IV site no erythema tenderness or discharge    Joints no swelling or LOM    CNS AAO X 3 no focal    Lab Data:                          9.3    21.73 )-----------( 226      ( 13 Jan 2025 00:26 )             28.4       01-13    136  |  97  |  52[H]  ----------------------------<  238[H]  4.6   |  22  |  4.59[H]    Ca    8.6      13 Jan 2025 00:26  Phos  5.0     01-13  Mg     2.4     01-13    TPro  6.3  /  Alb  3.2[L]  /  TBili  1.0  /  DBili  x   /  AST  23  /  ALT  36  /  AlkPhos  85  01-13      Urinalysis Basic - ( 13 Jan 2025 00:26 )    Color: x / Appearance: x / SG: x / pH: x  Gluc: 238 mg/dL / Ketone: x  / Bili: x / Urobili: x   Blood: x / Protein: x / Nitrite: x   Leuk Esterase: x / RBC: x / WBC x   Sq Epi: x / Non Sq Epi: x / Bacteria: x        Bronchial  01-11-25 --  --  --      Bronchial  01-10-25   Commensal manjit consistent with body site  --    No polymorphonuclear leukocytes seen per low power field  No squamous epithelial cells seen per low power field  No organisms seen per oil power field      .Blood BLOOD  01-09-25   No growth at 72 Hours  --  --      .Blood BLOOD  01-07-25   No growth at 5 days  --  --                    WBC Count: 21.73 (01-13-25 @ 00:26)  WBC Count: 21.67 (01-12-25 @ 00:30)  WBC Count: 29.61 (01-11-25 @ 00:32)  WBC Count: 33.53 (01-10-25 @ 00:59)  WBC Count: 27.85 (01-09-25 @ 00:31)  WBC Count: 25.63 (01-08-25 @ 00:28)  WBC Count: 21.04 (01-07-25 @ 00:26)  WBC Count: 23.28 (01-06-25 @ 18:25)  WBC Count: 21.81 (01-06-25 @ 13:30)       Bilirubin Total: 1.0 mg/dL (01-13-25 @ 00:26)  Aspartate Aminotransferase (AST/SGOT): 23 U/L (01-13-25 @ 00:26)  Alanine Aminotransferase (ALT/SGPT): 36 U/L (01-13-25 @ 00:26)  Alkaline Phosphatase: 85 U/L (01-13-25 @ 00:26)  Bilirubin Total: 1.3 mg/dL (01-12-25 @ 00:30)  Aspartate Aminotransferase (AST/SGOT): 24 U/L (01-12-25 @ 00:30)  Alanine Aminotransferase (ALT/SGPT): 48 U/L (01-12-25 @ 00:30)  Alkaline Phosphatase: 91 U/L (01-12-25 @ 00:30)  Bilirubin Total: 1.0 mg/dL (01-11-25 @ 00:32)  Aspartate Aminotransferase (AST/SGOT): 40 U/L (01-11-25 @ 00:32)  Alanine Aminotransferase (ALT/SGPT): 88 U/L (01-11-25 @ 00:32)  Alkaline Phosphatase: 102 U/L (01-11-25 @ 00:32)  Bilirubin Total: 1.1 mg/dL (01-10-25 @ 00:59)  Aspartate Aminotransferase (AST/SGOT): 63 U/L (01-10-25 @ 00:59)  Alanine Aminotransferase (ALT/SGPT): 128 U/L (01-10-25 @ 00:59)  Alkaline Phosphatase: 131 U/L (01-10-25 @ 00:59)  Bilirubin Total: 1.5 mg/dL (01-09-25 @ 00:31)  Aspartate Aminotransferase (AST/SGOT): 68 U/L (01-09-25 @ 00:31)  Alanine Aminotransferase (ALT/SGPT): 171 U/L (01-09-25 @ 00:31)  Alkaline Phosphatase: 114 U/L (01-09-25 @ 00:31)         Patient is a 54y old  Male who presents with a chief complaint of CAD s/p CABG (12 Jan 2025 09:14)    Being followed by ID for        Interval history:  pt remains extubated  pt with NG tube  pt failed swallow study  pt with loose BMs - felt to be related to the feeds- no odor-per team  pt with irritation of buttocks   still with copious secretions  No other acute events        PAST MEDICAL & SURGICAL HISTORY:  Type 2 diabetes mellitus      Hypertension      End stage renal disease  started HD 2/2019 T, Th, Sat via right chest permacath      Bone spur  right shoulder- hx of - sx done      Anemia      Injury of right wrist  hx of at age 14      History of hyperkalemia  before HD- K-6.2 - to repeat in am of sx, pt had HD today      S/P arthroscopy of right shoulder  2010      History of vascular access device  right chest permacath - 2/2019      H/O right wrist surgery  tendons repair - at age 14      AV fistula      H/O ventral hernia repair      S/P cholecystectomy        Allergies    No Known Allergies    Intolerances      Antimicrobials:    meropenem  IVPB 500 milliGRAM(s) IV Intermittent every 24 hours  tobramycin for Nebulization 300 milliGRAM(s) Inhalation every 12 hours  vancomycin    Solution 125 milliGRAM(s) Oral every 12 hours    MEDICATIONS  (STANDING):  albuterol/ipratropium for Nebulization 3 milliLiter(s) Nebulizer every 6 hours  aMIOdarone    Tablet   Oral   aMIOdarone    Tablet 200 milliGRAM(s) Oral daily  aspirin  chewable 81 milliGRAM(s) Oral daily  bisacodyl Suppository 10 milliGRAM(s) Rectal once  chlorhexidine 2% Cloths 1 Application(s) Topical daily  chlorhexidine 4% Liquid 1 Application(s) Topical <User Schedule>  dextrose 50% Injectable 50 milliLiter(s) IV Push every 15 minutes  dextrose 50% Injectable 25 milliLiter(s) IV Push every 15 minutes  DOBUTamine Infusion 2 MICROgram(s)/kG/Min (2.55 mL/Hr) IV Continuous <Continuous>  epoetin ignacia (EPOGEN) Injectable 72080 Unit(s) IV Push <User Schedule>  gabapentin 100 milliGRAM(s) Oral every 12 hours  heparin  Infusion 300 Unit(s)/Hr (13 mL/Hr) IV Continuous <Continuous>  insulin lispro (ADMELOG) corrective regimen sliding scale   SubCutaneous every 6 hours  insulin regular Infusion 2 Unit(s)/Hr (2 mL/Hr) IV Continuous <Continuous>  lidocaine   4% Patch 2 Patch Transdermal daily  meropenem  IVPB 500 milliGRAM(s) IV Intermittent every 24 hours  Nephro-elicia 1 Tablet(s) Oral daily  pantoprazole  Injectable 40 milliGRAM(s) IV Push daily  sodium chloride 0.9%. 1000 milliLiter(s) (10 mL/Hr) IV Continuous <Continuous>  sodium chloride 3%  Inhalation 4 milliLiter(s) Inhalation every 6 hours  tobramycin for Nebulization 300 milliGRAM(s) Inhalation every 12 hours  traZODone 100 milliGRAM(s) Oral at bedtime  vancomycin    Solution 125 milliGRAM(s) Oral every 12 hours      Vital Signs Last 24 Hrs  T(C): 36.4 (01-13-25 @ 08:00), Max: 37.2 (01-12-25 @ 12:00)  T(F): 97.6 (01-13-25 @ 08:00), Max: 99 (01-12-25 @ 12:00)  HR: 78 (01-13-25 @ 09:00) (72 - 86)  BP: 102/56 (01-13-25 @ 08:00) (101/56 - 136/69)  BP(mean): 76 (01-13-25 @ 08:00) (74 - 95)  RR: 29 (01-13-25 @ 09:00) (15 - 29)  SpO2: 92% (01-13-25 @ 09:00) (92% - 100%)    Physical Exam:    Constitutional well preserved,comfortable,pleasant    HEENT PERRLA EOMI,No pallor or icterus    NG tube    Neck supple no JVD or LN    Chest Good AE,CTA    CVS S1 S2     Abd soft BS normal No tenderness n    Ext No cyanosis clubbing or edema    IV site no erythema tenderness or discharge    Joints no swelling or LOM    CNS AAO X 3 no focal    Lab Data:                          9.3    21.73 )-----------( 226      ( 13 Jan 2025 00:26 )             28.4       01-13    136  |  97  |  52[H]  ----------------------------<  238[H]  4.6   |  22  |  4.59[H]    Ca    8.6      13 Jan 2025 00:26  Phos  5.0     01-13  Mg     2.4     01-13    TPro  6.3  /  Alb  3.2[L]  /  TBili  1.0  /  DBili  x   /  AST  23  /  ALT  36  /  AlkPhos  85  01-13      Urinalysis Basic - ( 13 Jan 2025 00:26 )    Color: x / Appearance: x / SG: x / pH: x  Gluc: 238 mg/dL / Ketone: x  / Bili: x / Urobili: x   Blood: x / Protein: x / Nitrite: x   Leuk Esterase: x / RBC: x / WBC x   Sq Epi: x / Non Sq Epi: x / Bacteria: x        Bronchial  01-11-25 --  --  --      Bronchial  01-10-25   Commensal manjit consistent with body site  --    No polymorphonuclear leukocytes seen per low power field  No squamous epithelial cells seen per low power field  No organisms seen per oil power field      .Blood BLOOD  01-09-25   No growth at 72 Hours  --  --      .Blood BLOOD  01-07-25   No growth at 5 days  --  --                    WBC Count: 21.73 (01-13-25 @ 00:26)  WBC Count: 21.67 (01-12-25 @ 00:30)  WBC Count: 29.61 (01-11-25 @ 00:32)  WBC Count: 33.53 (01-10-25 @ 00:59)  WBC Count: 27.85 (01-09-25 @ 00:31)  WBC Count: 25.63 (01-08-25 @ 00:28)  WBC Count: 21.04 (01-07-25 @ 00:26)  WBC Count: 23.28 (01-06-25 @ 18:25)  WBC Count: 21.81 (01-06-25 @ 13:30)       Bilirubin Total: 1.0 mg/dL (01-13-25 @ 00:26)  Aspartate Aminotransferase (AST/SGOT): 23 U/L (01-13-25 @ 00:26)  Alanine Aminotransferase (ALT/SGPT): 36 U/L (01-13-25 @ 00:26)  Alkaline Phosphatase: 85 U/L (01-13-25 @ 00:26)  Bilirubin Total: 1.3 mg/dL (01-12-25 @ 00:30)  Aspartate Aminotransferase (AST/SGOT): 24 U/L (01-12-25 @ 00:30)  Alanine Aminotransferase (ALT/SGPT): 48 U/L (01-12-25 @ 00:30)  Alkaline Phosphatase: 91 U/L (01-12-25 @ 00:30)  Bilirubin Total: 1.0 mg/dL (01-11-25 @ 00:32)  Aspartate Aminotransferase (AST/SGOT): 40 U/L (01-11-25 @ 00:32)  Alanine Aminotransferase (ALT/SGPT): 88 U/L (01-11-25 @ 00:32)  Alkaline Phosphatase: 102 U/L (01-11-25 @ 00:32)  Bilirubin Total: 1.1 mg/dL (01-10-25 @ 00:59)  Aspartate Aminotransferase (AST/SGOT): 63 U/L (01-10-25 @ 00:59)  Alanine Aminotransferase (ALT/SGPT): 128 U/L (01-10-25 @ 00:59)  Alkaline Phosphatase: 131 U/L (01-10-25 @ 00:59)  Bilirubin Total: 1.5 mg/dL (01-09-25 @ 00:31)  Aspartate Aminotransferase (AST/SGOT): 68 U/L (01-09-25 @ 00:31)  Alanine Aminotransferase (ALT/SGPT): 171 U/L (01-09-25 @ 00:31)  Alkaline Phosphatase: 114 U/L (01-09-25 @ 00:31)         Patient is a 54y old  Male who presents with a chief complaint of CAD s/p CABG (12 Jan 2025 09:14)    Being followed by ID for        Interval history:  pt remains extubated  pt with NG tube  pt failed swallow study  pt with loose BMs - felt to be related to the feeds- no odor-per team  pt with irritation of buttocks   still with copious secretions  receiving hemodialysis   No other acute events        PAST MEDICAL & SURGICAL HISTORY:  Type 2 diabetes mellitus      Hypertension      End stage renal disease  started HD 2/2019 T, Th, Sat via right chest permacath      Bone spur  right shoulder- hx of - sx done      Anemia      Injury of right wrist  hx of at age 14      History of hyperkalemia  before HD- K-6.2 - to repeat in am of sx, pt had HD today      S/P arthroscopy of right shoulder  2010      History of vascular access device  right chest permacath - 2/2019      H/O right wrist surgery  tendons repair - at age 14      AV fistula      H/O ventral hernia repair      S/P cholecystectomy        Allergies    No Known Allergies    Intolerances      Antimicrobials:    meropenem  IVPB 500 milliGRAM(s) IV Intermittent every 24 hours  tobramycin for Nebulization 300 milliGRAM(s) Inhalation every 12 hours  vancomycin    Solution 125 milliGRAM(s) Oral every 12 hours    MEDICATIONS  (STANDING):  albuterol/ipratropium for Nebulization 3 milliLiter(s) Nebulizer every 6 hours  aMIOdarone    Tablet   Oral   aMIOdarone    Tablet 200 milliGRAM(s) Oral daily  aspirin  chewable 81 milliGRAM(s) Oral daily  bisacodyl Suppository 10 milliGRAM(s) Rectal once  chlorhexidine 2% Cloths 1 Application(s) Topical daily  chlorhexidine 4% Liquid 1 Application(s) Topical <User Schedule>  dextrose 50% Injectable 50 milliLiter(s) IV Push every 15 minutes  dextrose 50% Injectable 25 milliLiter(s) IV Push every 15 minutes  DOBUTamine Infusion 2 MICROgram(s)/kG/Min (2.55 mL/Hr) IV Continuous <Continuous>  epoetin ignacia (EPOGEN) Injectable 95341 Unit(s) IV Push <User Schedule>  gabapentin 100 milliGRAM(s) Oral every 12 hours  heparin  Infusion 300 Unit(s)/Hr (13 mL/Hr) IV Continuous <Continuous>  insulin lispro (ADMELOG) corrective regimen sliding scale   SubCutaneous every 6 hours  insulin regular Infusion 2 Unit(s)/Hr (2 mL/Hr) IV Continuous <Continuous>  lidocaine   4% Patch 2 Patch Transdermal daily  meropenem  IVPB 500 milliGRAM(s) IV Intermittent every 24 hours  Nephro-elicia 1 Tablet(s) Oral daily  pantoprazole  Injectable 40 milliGRAM(s) IV Push daily  sodium chloride 0.9%. 1000 milliLiter(s) (10 mL/Hr) IV Continuous <Continuous>  sodium chloride 3%  Inhalation 4 milliLiter(s) Inhalation every 6 hours  tobramycin for Nebulization 300 milliGRAM(s) Inhalation every 12 hours  traZODone 100 milliGRAM(s) Oral at bedtime  vancomycin    Solution 125 milliGRAM(s) Oral every 12 hours      Vital Signs Last 24 Hrs  T(C): 36.4 (01-13-25 @ 08:00), Max: 37.2 (01-12-25 @ 12:00)  T(F): 97.6 (01-13-25 @ 08:00), Max: 99 (01-12-25 @ 12:00)  HR: 78 (01-13-25 @ 09:00) (72 - 86)  BP: 102/56 (01-13-25 @ 08:00) (101/56 - 136/69)  BP(mean): 76 (01-13-25 @ 08:00) (74 - 95)  RR: 29 (01-13-25 @ 09:00) (15 - 29)  SpO2: 92% (01-13-25 @ 09:00) (92% - 100%)    Physical Exam:    Constitutional well preserved,comfortable,pleasant    HEENT PERRLA EOMI,No pallor or icterus    NG tube    Neck supple no JVD or LN    Chest Good AE,CTA    CVS S1 S2     Abd soft BS normal No tenderness     Ext No cyanosis clubbing or edema    IV site no erythema tenderness or discharge    Joints no swelling or LOM        Lab Data:                          9.3    21.73 )-----------( 226      ( 13 Jan 2025 00:26 )             28.4       01-13    136  |  97  |  52[H]  ----------------------------<  238[H]  4.6   |  22  |  4.59[H]    Ca    8.6      13 Jan 2025 00:26  Phos  5.0     01-13  Mg     2.4     01-13    TPro  6.3  /  Alb  3.2[L]  /  TBili  1.0  /  DBili  x   /  AST  23  /  ALT  36  /  AlkPhos  85  01-13            Bronchial  01-11-25 --  --  --      Bronchial  01-10-25   Commensal manjit consistent with body site  --    No polymorphonuclear leukocytes seen per low power field  No squamous epithelial cells seen per low power field  No organisms seen per oil power field      .Blood BLOOD  01-09-25   No growth at 72 Hours  --  --      .Blood BLOOD  01-07-25   No growth at 5 days  --  --    WBC Count: 21.73 (01-13-25 @ 00:26)  WBC Count: 21.67 (01-12-25 @ 00:30)  WBC Count: 29.61 (01-11-25 @ 00:32)  WBC Count: 33.53 (01-10-25 @ 00:59)  WBC Count: 27.85 (01-09-25 @ 00:31)  WBC Count: 25.63 (01-08-25 @ 00:28)  WBC Count: 21.04 (01-07-25 @ 00:26)  WBC Count: 23.28 (01-06-25 @ 18:25)  WBC Count: 21.81 (01-06-25 @ 13:30)       Bilirubin Total: 1.0 mg/dL (01-13-25 @ 00:26)  Aspartate Aminotransferase (AST/SGOT): 23 U/L (01-13-25 @ 00:26)  Alanine Aminotransferase (ALT/SGPT): 36 U/L (01-13-25 @ 00:26)  Alkaline Phosphatase: 85 U/L (01-13-25 @ 00:26)    Bilirubin Total: 1.3 mg/dL (01-12-25 @ 00:30)  Aspartate Aminotransferase (AST/SGOT): 24 U/L (01-12-25 @ 00:30)  Alanine Aminotransferase (ALT/SGPT): 48 U/L (01-12-25 @ 00:30)  Alkaline Phosphatase: 91 U/L (01-12-25 @ 00:30)    Bilirubin Total: 1.0 mg/dL (01-11-25 @ 00:32)  Aspartate Aminotransferase (AST/SGOT): 40 U/L (01-11-25 @ 00:32)  Alanine Aminotransferase (ALT/SGPT): 88 U/L (01-11-25 @ 00:32)  Alkaline Phosphatase: 102 U/L (01-11-25 @ 00:32)

## 2025-01-14 LAB
ALBUMIN SERPL ELPH-MCNC: 3.1 G/DL — LOW (ref 3.3–5)
ALP SERPL-CCNC: 85 U/L — SIGNIFICANT CHANGE UP (ref 40–120)
ALT FLD-CCNC: 26 U/L — SIGNIFICANT CHANGE UP (ref 10–45)
ANION GAP SERPL CALC-SCNC: 15 MMOL/L — SIGNIFICANT CHANGE UP (ref 5–17)
APTT BLD: 45.8 SEC — HIGH (ref 24.5–35.6)
AST SERPL-CCNC: 21 U/L — SIGNIFICANT CHANGE UP (ref 10–40)
BASOPHILS # BLD AUTO: 0.05 K/UL — SIGNIFICANT CHANGE UP (ref 0–0.2)
BASOPHILS NFR BLD AUTO: 0.2 % — SIGNIFICANT CHANGE UP (ref 0–2)
BILIRUB SERPL-MCNC: 0.7 MG/DL — SIGNIFICANT CHANGE UP (ref 0.2–1.2)
BUN SERPL-MCNC: 46 MG/DL — HIGH (ref 7–23)
CALCIUM SERPL-MCNC: 8.8 MG/DL — SIGNIFICANT CHANGE UP (ref 8.4–10.5)
CHLORIDE SERPL-SCNC: 97 MMOL/L — SIGNIFICANT CHANGE UP (ref 96–108)
CO2 SERPL-SCNC: 24 MMOL/L — SIGNIFICANT CHANGE UP (ref 22–31)
CREAT SERPL-MCNC: 3.81 MG/DL — HIGH (ref 0.5–1.3)
CULTURE RESULTS: SIGNIFICANT CHANGE UP
EGFR: 18 ML/MIN/1.73M2 — LOW
EGFR: 18 ML/MIN/1.73M2 — LOW
EOSINOPHIL # BLD AUTO: 0 K/UL — SIGNIFICANT CHANGE UP (ref 0–0.5)
EOSINOPHIL NFR BLD AUTO: 0 % — SIGNIFICANT CHANGE UP (ref 0–6)
GAS PNL BLDA: SIGNIFICANT CHANGE UP
GAS PNL BLDV: SIGNIFICANT CHANGE UP
HCT VFR BLD CALC: 29.4 % — LOW (ref 39–50)
HGB BLD-MCNC: 9.5 G/DL — LOW (ref 13–17)
IMM GRANULOCYTES NFR BLD AUTO: 1 % — HIGH (ref 0–0.9)
LYMPHOCYTES # BLD AUTO: 0.34 K/UL — LOW (ref 1–3.3)
MAGNESIUM SERPL-MCNC: 2.2 MG/DL — SIGNIFICANT CHANGE UP (ref 1.6–2.6)
MCHC RBC-ENTMCNC: 29.8 PG — SIGNIFICANT CHANGE UP (ref 27–34)
MCHC RBC-ENTMCNC: 32.3 G/DL — SIGNIFICANT CHANGE UP (ref 32–36)
MCV RBC AUTO: 92.2 FL — SIGNIFICANT CHANGE UP (ref 80–100)
MONOCYTES NFR BLD AUTO: 3.5 % — SIGNIFICANT CHANGE UP (ref 2–14)
NEUTROPHILS # BLD AUTO: 20.4 K/UL — HIGH (ref 1.8–7.4)
NEUTROPHILS NFR BLD AUTO: 93.7 % — HIGH (ref 43–77)
NRBC # BLD: 0 /100 WBCS — SIGNIFICANT CHANGE UP (ref 0–0)
NRBC BLD-RTO: 0 /100 WBCS — SIGNIFICANT CHANGE UP (ref 0–0)
PHOSPHATE SERPL-MCNC: 4 MG/DL — SIGNIFICANT CHANGE UP (ref 2.5–4.5)
PLATELET # BLD AUTO: 292 K/UL — SIGNIFICANT CHANGE UP (ref 150–400)
POTASSIUM SERPL-MCNC: 4 MMOL/L — SIGNIFICANT CHANGE UP (ref 3.5–5.3)
POTASSIUM SERPL-SCNC: 4 MMOL/L — SIGNIFICANT CHANGE UP (ref 3.5–5.3)
PROT SERPL-MCNC: 6.1 G/DL — SIGNIFICANT CHANGE UP (ref 6–8.3)
RBC # BLD: 3.19 M/UL — LOW (ref 4.2–5.8)
RBC # FLD: 20.9 % — HIGH (ref 10.3–14.5)
SODIUM SERPL-SCNC: 136 MMOL/L — SIGNIFICANT CHANGE UP (ref 135–145)
SPECIMEN SOURCE: SIGNIFICANT CHANGE UP
WBC # BLD: 21.76 K/UL — HIGH (ref 3.8–10.5)
WBC # FLD AUTO: 21.76 K/UL — HIGH (ref 3.8–10.5)

## 2025-01-14 PROCEDURE — G0545: CPT

## 2025-01-14 PROCEDURE — 71045 X-RAY EXAM CHEST 1 VIEW: CPT | Mod: 26

## 2025-01-14 PROCEDURE — 71045 X-RAY EXAM CHEST 1 VIEW: CPT | Mod: 26,77

## 2025-01-14 PROCEDURE — 99291 CRITICAL CARE FIRST HOUR: CPT

## 2025-01-14 PROCEDURE — 99232 SBSQ HOSP IP/OBS MODERATE 35: CPT

## 2025-01-14 RX ORDER — FENTANYL CITRATE-0.9 % NACL/PF 100MCG/2ML
25 SYRINGE (ML) INTRAVENOUS ONCE
Refills: 0 | Status: DISCONTINUED | OUTPATIENT
Start: 2025-01-14 | End: 2025-01-14

## 2025-01-14 RX ORDER — FLUCONAZOLE 150 MG
200 TABLET ORAL EVERY 24 HOURS
Refills: 0 | Status: COMPLETED | OUTPATIENT
Start: 2025-01-14 | End: 2025-01-15

## 2025-01-14 RX ADMIN — Medication 10 MILLIGRAM(S): at 20:03

## 2025-01-14 RX ADMIN — Medication 125 MILLIGRAM(S): at 17:51

## 2025-01-14 RX ADMIN — Medication 5 MILLIGRAM(S): at 09:45

## 2025-01-14 RX ADMIN — Medication 4 MILLILITER(S): at 17:23

## 2025-01-14 RX ADMIN — Medication 1 APPLICATION(S): at 21:29

## 2025-01-14 RX ADMIN — IPRATROPIUM BROMIDE AND ALBUTEROL SULFATE 3 MILLILITER(S): .5; 2.5 SOLUTION RESPIRATORY (INHALATION) at 05:23

## 2025-01-14 RX ADMIN — TOBRAMYCIN 300 MILLIGRAM(S): 300 SOLUTION RESPIRATORY (INHALATION) at 05:23

## 2025-01-14 RX ADMIN — Medication 50 MILLIGRAM(S): at 02:00

## 2025-01-14 RX ADMIN — Medication 40 MILLIGRAM(S): at 11:19

## 2025-01-14 RX ADMIN — BENZOCAINE 1 SPRAY(S): 220 SPRAY, METERED PERIODONTAL at 12:42

## 2025-01-14 RX ADMIN — IPRATROPIUM BROMIDE AND ALBUTEROL SULFATE 3 MILLILITER(S): .5; 2.5 SOLUTION RESPIRATORY (INHALATION) at 17:22

## 2025-01-14 RX ADMIN — ATORVASTATIN CALCIUM 80 MILLIGRAM(S): 80 TABLET, FILM COATED ORAL at 21:22

## 2025-01-14 RX ADMIN — LIDOCAINE HYDROCHLORIDE 2 PATCH: 20 JELLY TOPICAL at 22:18

## 2025-01-14 RX ADMIN — Medication 125 MILLIGRAM(S): at 05:02

## 2025-01-14 RX ADMIN — IPRATROPIUM BROMIDE AND ALBUTEROL SULFATE 3 MILLILITER(S): .5; 2.5 SOLUTION RESPIRATORY (INHALATION) at 11:20

## 2025-01-14 RX ADMIN — Medication 1 TABLET(S): at 11:19

## 2025-01-14 RX ADMIN — LIDOCAINE HYDROCHLORIDE 2 PATCH: 20 JELLY TOPICAL at 11:18

## 2025-01-14 RX ADMIN — Medication 1 APPLICATION(S): at 06:05

## 2025-01-14 RX ADMIN — Medication 1 SPRAY(S): at 17:51

## 2025-01-14 RX ADMIN — Medication 100 MILLIGRAM(S): at 21:22

## 2025-01-14 RX ADMIN — GABAPENTIN 100 MILLIGRAM(S): 400 CAPSULE ORAL at 05:03

## 2025-01-14 RX ADMIN — Medication 10 MILLILITER(S): at 20:02

## 2025-01-14 RX ADMIN — BENZOCAINE 1 SPRAY(S): 220 SPRAY, METERED PERIODONTAL at 19:59

## 2025-01-14 RX ADMIN — LIDOCAINE HYDROCHLORIDE 2 PATCH: 20 JELLY TOPICAL at 18:16

## 2025-01-14 RX ADMIN — TOBRAMYCIN 300 MILLIGRAM(S): 300 SOLUTION RESPIRATORY (INHALATION) at 17:24

## 2025-01-14 RX ADMIN — AMIODARONE HYDROCHLORIDE 200 MILLIGRAM(S): 50 INJECTION, SOLUTION INTRAVENOUS at 05:02

## 2025-01-14 RX ADMIN — Medication 4 MILLILITER(S): at 11:21

## 2025-01-14 RX ADMIN — Medication 25 MICROGRAM(S): at 01:00

## 2025-01-14 RX ADMIN — HEPARIN SODIUM 13 UNIT(S)/HR: 1000 INJECTION INTRAVENOUS; SUBCUTANEOUS at 20:02

## 2025-01-14 RX ADMIN — DOBUTAMINE 1.28 MICROGRAM(S)/KG/MIN: 250 INJECTION INTRAVENOUS at 20:02

## 2025-01-14 RX ADMIN — Medication 25 MICROGRAM(S): at 01:15

## 2025-01-14 RX ADMIN — Medication 81 MILLIGRAM(S): at 11:20

## 2025-01-14 RX ADMIN — Medication 10 MILLIGRAM(S): at 12:42

## 2025-01-14 RX ADMIN — MEROPENEM 100 MILLIGRAM(S): 1 INJECTION INTRAVENOUS at 22:17

## 2025-01-14 RX ADMIN — IPRATROPIUM BROMIDE AND ALBUTEROL SULFATE 3 MILLILITER(S): .5; 2.5 SOLUTION RESPIRATORY (INHALATION) at 23:04

## 2025-01-14 RX ADMIN — Medication 1 SPRAY(S): at 05:03

## 2025-01-14 RX ADMIN — Medication 4 MILLILITER(S): at 05:23

## 2025-01-14 RX ADMIN — GABAPENTIN 100 MILLIGRAM(S): 400 CAPSULE ORAL at 17:51

## 2025-01-14 RX ADMIN — Medication 4 MILLILITER(S): at 23:09

## 2025-01-14 NOTE — PROGRESS NOTE ADULT - ASSESSMENT
54M with PMH of HTN, HLD, ESRD on HD MWF via LUE AVF, ascites (requiring repeat paracenteses q4-6wks), NICM with moderate LV dysfunction w/ EF 35-40%() and CAD s/p atherectomy + SALLIE to D1(2024) presents to [facility] transferred from [facility] Patient initially presented to [facility] ED with shortness of breath. Of note he was recently admitted from - for acute cholecystitis s/p cholecystectomy. In [facility] ED, pt was hypoxic to 86% on RA, trop 1.7K, EKG no new changes, CXR fluid overload. Admitted for AHRF 2/2 fluid overload. Pt received HD on , ,  and . DAPT was resumed, TTE showed improvement in LVEF to 40-45%. Stress test was abnormal with 1mm inferior STD, reversible ischemia in the inferior wall and fixed defects in the lateral, anterior and apical walls with post stress EF of 24%. Pt was then transferred to [facility] for cardiac cath which showed pLAD 70% ISR, mLCx 70%, D1 severe dz. Patient now transferred to [facility] CTS Dr. Rivers for CABG eval. Patient denies any current SOB or chest pain on admission. Otherwise denies headaches, abdominal pain, urinary or bowel changes, fevers, chills, N/V/D, sick contacts.  (24 Dec 2024 05:07)   VSS  CP free   HD via avf  for daily HD pre op- on lovenox 30 qd    HD today continue with present meds. Plan for CABG possibleTVR next week   vss; rsr 55-80; hd today w/ 1 unit prbc; pt to be dialzyed also this  w/ another 1 unit prbc   VSS; RSR 60-80; continue asa/bb/ statin; HD in am - transfuse 1 unit prbc with HD; d/c lovenox after am dose sun    - Pt underwent  C3L free LIMA-LAD; SVG-Diag; SVG-leftPL  Pt given cefuroxime perioperatively   pt extubated   . pt with hypotension and ECHO showing Systolic HF/depressed BIV Function, currently supported with /pressors.  Labs this evening showing transaminitis  1/2 -3 pt hypotensive, febrile and reintubated  Pt pancultured. and started on zosyn- meropenem and gent  pt found to have Gram negative bacteremia    E coli +ESBL bacteremia        A/P  #sepsis  ? source of bacteremia  - many options. Pt with abnormal u/a but is a dialysis pt so hard to interpret. Pt with h//o SBP- pt with ascites   s/p  paracentesis 1/3  cultures No Growth to date  - no organisms notes on gram stain  Likely from lungs - CT with consolidation Left lung, especially LLL  Continue meropenem- day # 11  lines  were  changed, s/p d/c shiley   Pt with worsening WBC. ? from plug  Ct with left sided consolidation- check cultures. also with large ascites. consdier repeat paracentesis   check c diff if  continues to have diarrhea  check bladder scan- to make sure no urinary source- not distended on CT   U/S of graft 1 with stenosis but normal function.  IF NO ANSWER AFTER ABOVE THEN CONSIDER wbc TAGGED STUDY   unclear why WBC has not improved further        #elevated LFTs  likely shock liver  acute  hepatitis serology- negative   trend  avoid hepatotoxic meds    improving daily         #thrombocytopenia   HIt ab negative  Likely from sepsis vs from IABP or drugs   trend   resolved !    #Renal failure  going back to Hemodialysis   changed the meropenem to daily dosing - FOR RENAL FUNCTION    #hoarse voice  suggest ENT input       Marla Champion M.D. ,   please reach via teams   If no answer, or after 5PM/ weekends,  then please call  [phone]    Assessment and plan discussed with the primary team .

## 2025-01-14 NOTE — PROGRESS NOTE ADULT - SUBJECTIVE AND OBJECTIVE BOX
Pulmonary Consult Follow up     Interval Events:    no acute events.     oxygenation improving.    REVIEW OF SYSTEMS:  [x] All other systems negative except per HPI   [ ] Unable to assess ROS because ________    OBJECTIVE:  ICU Vital Signs Last 24 Hrs  T(C): 36.9 (14 Jan 2025 04:00), Max: 37.1 (13 Jan 2025 16:00)  T(F): 98.4 (14 Jan 2025 04:00), Max: 98.8 (13 Jan 2025 16:00)  HR: 84 (14 Jan 2025 07:00) (79 - 88)  BP: --  BP(mean): --  ABP: 170/51 (14 Jan 2025 07:00) (123/42 - 203/49)  ABP(mean): 75 (14 Jan 2025 07:00) (60 - 83)  RR: 20 (14 Jan 2025 07:00) (11 - 27)  SpO2: 100% (14 Jan 2025 07:00) (92% - 100%)    O2 Parameters below as of 14 Jan 2025 08:00  Patient On (Oxygen Delivery Method): nasal cannula              01-13 @ 07:01 - 01-14 @ 07:00  --------------------------------------------------------  IN: 1851.7 mL / OUT: 2000 mL / NET: -148.3 mL    01-14 @ 07:01 - 01-14 @ 09:14  --------------------------------------------------------  IN: 77.3 mL / OUT: 0 mL / NET: 77.3 mL        PHYSICAL EXAM:  GENERAL: NAD, well-groomed, well-developed  HEAD:  Atraumatic, Normocephalic  CHEST/LUNG: Breathing comfortably. Mobilizing secretions well.   HEART: Regular rate and rhythm; No murmurs, rubs, or gallops  VASCULAR: No  cyanosis, or edema  SKIN: No rashes or lesions  NERVOUS SYSTEM:  Alert & Oriented X3, Good concentration    HOSPITAL MEDICATIONS:  MEDICATIONS  (STANDING):  albuterol/ipratropium for Nebulization 3 milliLiter(s) Nebulizer every 6 hours  aMIOdarone    Tablet   Oral   aMIOdarone    Tablet 200 milliGRAM(s) Oral daily  aspirin  chewable 81 milliGRAM(s) Oral daily  atorvastatin 80 milliGRAM(s) Oral at bedtime  bisacodyl Suppository 10 milliGRAM(s) Rectal once  chlorhexidine 2% Cloths 1 Application(s) Topical daily  chlorhexidine 4% Liquid 1 Application(s) Topical <User Schedule>  dextrose 50% Injectable 50 milliLiter(s) IV Push every 15 minutes  dextrose 50% Injectable 25 milliLiter(s) IV Push every 15 minutes  DOBUTamine Infusion 1 MICROgram(s)/kG/Min (1.28 mL/Hr) IV Continuous <Continuous>  epoetin ignacia (EPOGEN) Injectable 22639 Unit(s) IV Push <User Schedule>  fluconAZOLE IVPB 200 milliGRAM(s) IV Intermittent every 24 hours  gabapentin 100 milliGRAM(s) Oral every 12 hours  heparin  Infusion 300 Unit(s)/Hr (13 mL/Hr) IV Continuous <Continuous>  insulin lispro (ADMELOG) corrective regimen sliding scale   SubCutaneous every 6 hours  insulin regular Infusion 2 Unit(s)/Hr (2 mL/Hr) IV Continuous <Continuous>  lidocaine   4% Patch 2 Patch Transdermal daily  meropenem  IVPB 500 milliGRAM(s) IV Intermittent every 24 hours  Nephro-elicia 1 Tablet(s) Oral daily  pantoprazole  Injectable 40 milliGRAM(s) IV Push daily  sodium chloride 0.65% Nasal 1 Spray(s) Both Nostrils every 12 hours  sodium chloride 0.9%. 1000 milliLiter(s) (10 mL/Hr) IV Continuous <Continuous>  sodium chloride 3%  Inhalation 4 milliLiter(s) Inhalation every 6 hours  tobramycin for Nebulization 300 milliGRAM(s) Inhalation every 12 hours  traZODone 100 milliGRAM(s) Oral at bedtime  vancomycin    Solution 125 milliGRAM(s) Oral every 12 hours    MEDICATIONS  (PRN):  benzocaine 20% Spray 1 Spray(s) Topical every 6 hours PRN throat pain  lidocaine/prilocaine Cream 1 Application(s) Topical daily PRN pre-HD  midodrine 10 milliGRAM(s) Oral every 8 hours PRN pre-HD  sodium chloride 0.9% lock flush 10 milliLiter(s) IV Push every 1 hour PRN Pre/post blood products, medications, blood draw, and to maintain line patency      LABS:  01-14    136  |  97  |  46[H]  ----------------------------<  159[H]  4.0   |  24  |  3.81[H]  01-13    136  |  97  |  52[H]  ----------------------------<  238[H]  4.6   |  22  |  4.59[H]  01-12    136  |  99  |  37[H]  ----------------------------<  142[H]  3.9   |  23  |  3.21[H]    Ca    8.8      14 Jan 2025 00:26  Ca    8.6      13 Jan 2025 00:26  Ca    8.4      12 Jan 2025 00:30  Phos  4.0     01-14  Mg     2.2     01-14    TPro  6.1  /  Alb  3.1[L]  /  TBili  0.7  /  DBili  x   /  AST  21  /  ALT  26  /  AlkPhos  85  01-14  TPro  6.3  /  Alb  3.2[L]  /  TBili  1.0  /  DBili  x   /  AST  23  /  ALT  36  /  AlkPhos  85  01-13  TPro  5.9[L]  /  Alb  3.0[L]  /  TBili  1.3[H]  /  DBili  x   /  AST  24  /  ALT  48[H]  /  AlkPhos  91  01-12    Magnesium: 2.2 mg/dL (01-14-25 @ 00:26)  Magnesium: 2.4 mg/dL (01-13-25 @ 00:26)  Magnesium: 2.2 mg/dL (01-12-25 @ 00:30)    Phosphorus: 4.0 mg/dL (01-14-25 @ 00:26)  Phosphorus: 5.0 mg/dL (01-13-25 @ 00:26)  Phosphorus: 3.9 mg/dL (01-12-25 @ 00:30)      PT/INR - ( 12 Jan 2025 22:43 )   PT: 14.4 sec;   INR: 1.27 ratio         PTT - ( 14 Jan 2025 00:26 )  PTT:45.8 sec              Urinalysis Basic - ( 14 Jan 2025 00:26 )    Color: x / Appearance: x / SG: x / pH: x  Gluc: 159 mg/dL / Ketone: x  / Bili: x / Urobili: x   Blood: x / Protein: x / Nitrite: x   Leuk Esterase: x / RBC: x / WBC x   Sq Epi: x / Non Sq Epi: x / Bacteria: x      ABG - ( 14 Jan 2025 00:03 )  pH, Arterial: 7.44  pH, Blood: x     /  pCO2: 39    /  pO2: 90    / HCO3: 26    / Base Excess: 2.2   /  SaO2: 99.0                                    9.5    21.76 )-----------( 292      ( 14 Jan 2025 00:26 )             29.4                         9.3    21.73 )-----------( 226      ( 13 Jan 2025 00:26 )             28.4                         8.8    21.67 )-----------( 198      ( 12 Jan 2025 00:30 )             27.4     CAPILLARY BLOOD GLUCOSE  161 (14 Jan 2025 08:00)  155 (14 Jan 2025 07:00)  136 (14 Jan 2025 06:00)  103 (14 Jan 2025 05:00)  111 (14 Jan 2025 04:00)  113 (14 Jan 2025 03:00)  135 (14 Jan 2025 02:00)  141 (14 Jan 2025 01:00)  149 (14 Jan 2025 00:00)  159 (13 Jan 2025 23:00)  145 (13 Jan 2025 22:00)  131 (13 Jan 2025 21:00)  95 (13 Jan 2025 20:00)  115 (13 Jan 2025 19:00)  128 (13 Jan 2025 18:00)  130 (13 Jan 2025 17:00)  175 (13 Jan 2025 16:00)  196 (13 Jan 2025 15:00)  205 (13 Jan 2025 14:00)  189 (13 Jan 2025 13:00)  135 (13 Jan 2025 12:00)  68 (13 Jan 2025 11:00)  101 (13 Jan 2025 10:00)      POCT Blood Glucose.: 132 mg/dL (14 Jan 2025 09:10)  POCT Blood Glucose.: 161 mg/dL (14 Jan 2025 07:57)  POCT Blood Glucose.: 155 mg/dL (14 Jan 2025 06:57)  POCT Blood Glucose.: 136 mg/dL (14 Jan 2025 06:03)  POCT Blood Glucose.: 103 mg/dL (14 Jan 2025 05:01)  POCT Blood Glucose.: 111 mg/dL (14 Jan 2025 04:06)  POCT Blood Glucose.: 113 mg/dL (14 Jan 2025 02:47)  POCT Blood Glucose.: 135 mg/dL (14 Jan 2025 02:14)  POCT Blood Glucose.: 141 mg/dL (14 Jan 2025 01:00)  POCT Blood Glucose.: 149 mg/dL (13 Jan 2025 23:59)  POCT Blood Glucose.: 159 mg/dL (13 Jan 2025 22:56)  POCT Blood Glucose.: 145 mg/dL (13 Jan 2025 21:43)  POCT Blood Glucose.: 131 mg/dL (13 Jan 2025 20:46)  POCT Blood Glucose.: 95 mg/dL (13 Jan 2025 19:50)  POCT Blood Glucose.: 115 mg/dL (13 Jan 2025 18:48)  POCT Blood Glucose.: 128 mg/dL (13 Jan 2025 18:11)  POCT Blood Glucose.: 133 mg/dL (13 Jan 2025 17:03)  POCT Blood Glucose.: 175 mg/dL (13 Jan 2025 15:55)  POCT Blood Glucose.: 196 mg/dL (13 Jan 2025 15:31)  POCT Blood Glucose.: 205 mg/dL (13 Jan 2025 14:11)  POCT Blood Glucose.: 189 mg/dL (13 Jan 2025 13:03)  POCT Blood Glucose.: 135 mg/dL (13 Jan 2025 12:17)  POCT Blood Glucose.: 96 mg/dL (13 Jan 2025 11:34)  POCT Blood Glucose.: 68 mg/dL (13 Jan 2025 11:06)  POCT Blood Glucose.: 101 mg/dL (13 Jan 2025 10:04)    Blood Gas Source Venous: Venous (01-14-25 @ 00:03)  Blood Gas Source Venous: Venous (01-12-25 @ 00:00)  Blood Gas Source Venous: Venous (01-11-25 @ 12:20)

## 2025-01-14 NOTE — PROGRESS NOTE ADULT - ASSESSMENT
Assessment  54 year old male with HTN, HLD, ESRD on HD, ascites, CHF, and CAD s/p PCI (4/2024) transferred to [facility] from [facility] for cardiac catheterization due to abnormal stress test. Cath showed multivessel disease now s/p CABG 12/30/24. Course c/b acute on chronic hypoxemic respiratory failure in setting of recurrent mucus plugging on left s/p multiple bronchoscopies.     overall pt is improving and doing better with airway clearance.    Recommendations  - Aggressive pulmonary toilet  - duonebs q6h  - 3% saline nebs q6h  - chest pt   - c/w  Volara to airway clearance regimen        transplant pulm to follow

## 2025-01-14 NOTE — PROGRESS NOTE ADULT - SUBJECTIVE AND OBJECTIVE BOX
[facility]    Dr. Nica Styles     Office [phone] (9 am to 5 pm)  Service: [phone] (5pm to 9am)  Also Available on TEAMS      RENAL PROGRESS NOTE: DATE OF SERVICE 01-14-25 @ 09:20    Patient is a 54y old  Male who presents with a chief complaint of CAD s/p CABG (14 Jan 2025 07:05)      Patient seen and examined at bedside. No chest pain/sob    VITALS:  T(F): 98 (01-14-25 @ 08:00), Max: 98.8 (01-13-25 @ 16:00)  HR: 87 (01-14-25 @ 09:00)  BP: --  RR: 17 (01-14-25 @ 09:00)  SpO2: 98% (01-14-25 @ 09:00)  Wt(kg): --    01-13 @ 07:01 - 01-14 @ 07:00  --------------------------------------------------------  IN: 1851.7 mL / OUT: 2000 mL / NET: -148.3 mL    01-14 @ 07:01 - 01-14 @ 09:20  --------------------------------------------------------  IN: 153.6 mL / OUT: 0 mL / NET: 153.6 mL          PHYSICAL EXAM:  Constitutional: NAD  Neck: No JVD  Respiratory: CTAB, no wheezes, rales or rhonchi  Cardiovascular: S1, S2, RRR  Gastrointestinal: BS+, soft, NT/ND  Extremities: No peripheral edema    Hospital Medications:   MEDICATIONS  (STANDING):  albuterol/ipratropium for Nebulization 3 milliLiter(s) Nebulizer every 6 hours  aMIOdarone    Tablet   Oral   aMIOdarone    Tablet 200 milliGRAM(s) Oral daily  aspirin  chewable 81 milliGRAM(s) Oral daily  atorvastatin 80 milliGRAM(s) Oral at bedtime  bisacodyl Suppository 10 milliGRAM(s) Rectal once  chlorhexidine 2% Cloths 1 Application(s) Topical daily  chlorhexidine 4% Liquid 1 Application(s) Topical <User Schedule>  dextrose 50% Injectable 50 milliLiter(s) IV Push every 15 minutes  dextrose 50% Injectable 25 milliLiter(s) IV Push every 15 minutes  DOBUTamine Infusion 1 MICROgram(s)/kG/Min (1.28 mL/Hr) IV Continuous <Continuous>  epoetin ignacia (EPOGEN) Injectable 51093 Unit(s) IV Push <User Schedule>  fluconAZOLE IVPB 200 milliGRAM(s) IV Intermittent every 24 hours  gabapentin 100 milliGRAM(s) Oral every 12 hours  heparin  Infusion 300 Unit(s)/Hr (13 mL/Hr) IV Continuous <Continuous>  insulin lispro (ADMELOG) corrective regimen sliding scale   SubCutaneous every 6 hours  insulin regular Infusion 2 Unit(s)/Hr (2 mL/Hr) IV Continuous <Continuous>  lidocaine   4% Patch 2 Patch Transdermal daily  meropenem  IVPB 500 milliGRAM(s) IV Intermittent every 24 hours  Nephro-elicia 1 Tablet(s) Oral daily  pantoprazole  Injectable 40 milliGRAM(s) IV Push daily  sodium chloride 0.65% Nasal 1 Spray(s) Both Nostrils every 12 hours  sodium chloride 0.9%. 1000 milliLiter(s) (10 mL/Hr) IV Continuous <Continuous>  sodium chloride 3%  Inhalation 4 milliLiter(s) Inhalation every 6 hours  tobramycin for Nebulization 300 milliGRAM(s) Inhalation every 12 hours  traZODone 100 milliGRAM(s) Oral at bedtime  vancomycin    Solution 125 milliGRAM(s) Oral every 12 hours      LABS:  01-14    136  |  97  |  46[H]  ----------------------------<  159[H]  4.0   |  24  |  3.81[H]    Ca    8.8      14 Jan 2025 00:26  Phos  4.0     01-14  Mg     2.2     01-14    TPro  6.1  /  Alb  3.1[L]  /  TBili  0.7  /  DBili      /  AST  21  /  ALT  26  /  AlkPhos  85  01-14    Creatinine Trend: 3.81 <--, 4.59 <--, 3.21 <--, 4.10 <--, 2.98 <--, 1.99 <--, 2.00 <--    Albumin: 3.1 g/dL (01-14 @ 00:26)  Phosphorus: 4.0 mg/dL (01-14 @ 00:26)                              9.5    21.76 )-----------( 292      ( 14 Jan 2025 00:26 )             29.4     Urine Studies:  Urinalysis - [01-14-25 @ 00:26]      Color  / Appearance  / SG  / pH       Gluc 159 / Ketone   / Bili  / Urobili        Blood  / Protein  / Leuk Est  / Nitrite       RBC  / WBC  / Hyaline  / Gran  / Sq Epi  / Non Sq Epi  / Bacteria       Iron 29, TIBC 143, %sat 20      [12-19-24 @ 05:00]  Ferritin 904      [12-19-24 @ 05:00]  TSH 4.75      [12-24-24 @ 06:50]    HBsAg Nonreact      [12-19-24 @ 05:00]      RADIOLOGY & ADDITIONAL STUDIES:

## 2025-01-14 NOTE — PROGRESS NOTE ADULT - SUBJECTIVE AND OBJECTIVE BOX
Patient seen and examined at the bedside.    Remains critically ill on continuous ICU monitoring.    Brief Summary:  55 yo M with multiple medical problems including CAD s/p PCI in April 2024 s/p CABG x 3 on 12/30/24 1/2: Intubated for hypoxia & left lung white out s/p awake bronch with removal of copious mucopurulent secretions  1/4: s/p IABP insertion, sepsis/septic shock due to E coli     24 Hour events:  Started insulin sliding scale  Significant pain from sacral wound.  Decadron for 24 hours for airway edema.    Objective:  Vital Signs Last 24 Hrs  T(C): 36.9 (14 Jan 2025 04:00), Max: 37.1 (13 Jan 2025 16:00)  T(F): 98.4 (14 Jan 2025 04:00), Max: 98.8 (13 Jan 2025 16:00)  HR: 83 (14 Jan 2025 06:00) (78 - 88)  BP: 102/56 (13 Jan 2025 08:00) (102/56 - 104/58)  BP(mean): 76 (13 Jan 2025 08:00) (75 - 76)  RR: 23 (14 Jan 2025 06:00) (11 - 29)  SpO2: 100% (14 Jan 2025 05:57) (92% - 100%)    Parameters below as of 14 Jan 2025 05:57  Patient On (Oxygen Delivery Method): nasal cannula    Physical Exam:  General: Alert and interactive   Neurology: Oriented, following commands  Respiratory: Diminished at bases  CV: Sinus  Abdominal: Soft, Nontender  Extremities: Warm, well-perfused  -------------------------------------------------------------------------------------------------------------------------------    Labs:                        9.5    21.76 )-----------( 292      ( 14 Jan 2025 00:26 )             29.4     01-14    136  |  97  |  46[H]  ----------------------------<  159[H]  4.0   |  24  |  3.81[H]    Ca    8.8      14 Jan 2025 00:26  Phos  4.0     01-14  Mg     2.2     01-14    TPro  6.1  /  Alb  3.1[L]  /  TBili  0.7  /  DBili  x   /  AST  21  /  ALT  26  /  AlkPhos  85  01-14    LIVER FUNCTIONS - ( 14 Jan 2025 00:26 )  Alb: 3.1 g/dL / Pro: 6.1 g/dL / ALK PHOS: 85 U/L / ALT: 26 U/L / AST: 21 U/L / GGT: x           PT/INR - ( 12 Jan 2025 22:43 )   PT: 14.4 sec;   INR: 1.27 ratio       PTT - ( 14 Jan 2025 00:26 )  PTT:45.8 sec  ABG - ( 14 Jan 2025 00:03 )  pH, Arterial: 7.44  pH, Blood: x     /  pCO2: 39    /  pO2: 90    / HCO3: 26    / Base Excess: 2.2   /  SaO2: 99.0    ------------------------------------------------------------------------------------------------------------------------------  Assessment:  55 yo M s/p CABG x 3 on 12/30/24    Postop acute pulmonary insufficiency  Cardiogenic and Septic shock  Acute blood loss anemia  Ascites    ESRD on iHD   E coli bacteremia 2/2 pneumonia  Leukocytosis     Plan:  ***Neuro***  Postoperative acute pain control with Gabapentin and prns.  Trazadone nightly  PT ongoing.    ***Cardiovascular***  s/p CABG x 3  Remains on Dobutamine  PO Amiodarone for Afib - completing load.  Midodrine prn for BP support.  ASA daily. Statin added now that LFTs normal.    ***Pulmonary***  Postoperative acute pulmonary insufficiency  Face tent for oxygen supplementation.  Bronchoscopy 1/12  Aggressive pulmonary clearance, Volera, mobilization.    ***GI***  Tube feeds   Protonix for stress ulcer prophylaxis.   Ascites: Paracentesis 1/11    ***Renal***  ESRD - HD yesterday.  LUE AV fistula     ***ID***  Sepsis/septic shock due to ESBL E. coli - Continue Meropenem. Trend leukocytosis   Diflucan for prophylaxis   Also on Mitch nebs.  1/9 Bcx pending   1/7 Bcx negative to date  1/5 Bcx negative to date  1/3 Bronch ESBL E Coli   1/2 Bcx ESBL E coli   PO Vancomycin for C diff prophylaxis     ***Endocrine***  Hyperglycemia- Insulin sliding scale and infusion    ***Hematology***  Acute blood loss anemia  No current transfusion indication.  PF4 negative 1/4  Heparin infusion for recent a fib.  Epogen.        Care plan discussed with the ICU care team.   Patient remains critical, at risk for life threatening decompensation.    I have spent 30 minutes providing critical care management to this patient.    By signing my name below, I, Baldemar Hernandez, attest that this documentation has been prepared under the direction and in the presence of Jen Sierra MD.  Electronically signed: Baldemar Hernandez, 01-14-25 @ 06:33    I, Jen Sierra,  personally performed the services described in this documentation. All medical record entries made by the scribe were at my direction and in my presence. I have reviewed the chart and agree that the record reflects my personal performance and is accurate and complete  Electronically signed: Jen Sierra MD. Patient seen and examined at the bedside.    Remains critically ill on continuous ICU monitoring.    Brief Summary:  53 yo M with multiple medical problems including CAD s/p PCI in April 2024 s/p CABG x 3 on 12/30/24 1/2: Intubated for hypoxia & left lung white out s/p awake bronch with removal of copious mucopurulent secretions  1/4: s/p IABP insertion, sepsis/septic shock due to E coli     24 Hour events:  HD yesterday for 2 liters removed.    Objective:  Vital Signs Last 24 Hrs  T(C): 36.9 (14 Jan 2025 04:00), Max: 37.1 (13 Jan 2025 16:00)  T(F): 98.4 (14 Jan 2025 04:00), Max: 98.8 (13 Jan 2025 16:00)  HR: 83 (14 Jan 2025 06:00) (78 - 88)  BP: 102/56 (13 Jan 2025 08:00) (102/56 - 104/58)  BP(mean): 76 (13 Jan 2025 08:00) (75 - 76)  RR: 23 (14 Jan 2025 06:00) (11 - 29)  SpO2: 100% (14 Jan 2025 05:57) (92% - 100%)    Parameters below as of 14 Jan 2025 05:57  Patient On (Oxygen Delivery Method): nasal cannula    Physical Exam:  General: Alert and interactive   Neurology: Oriented, following commands  Respiratory: Diminished at bases  CV: Sinus  Abdominal: Soft, Nontender  Extremities: Warm, well-perfused  -------------------------------------------------------------------------------------------------------------------------------    Labs:                        9.5    21.76 )-----------( 292      ( 14 Jan 2025 00:26 )             29.4     01-14    136  |  97  |  46[H]  ----------------------------<  159[H]  4.0   |  24  |  3.81[H]    Ca    8.8      14 Jan 2025 00:26  Phos  4.0     01-14  Mg     2.2     01-14    TPro  6.1  /  Alb  3.1[L]  /  TBili  0.7  /  DBili  x   /  AST  21  /  ALT  26  /  AlkPhos  85  01-14    LIVER FUNCTIONS - ( 14 Jan 2025 00:26 )  Alb: 3.1 g/dL / Pro: 6.1 g/dL / ALK PHOS: 85 U/L / ALT: 26 U/L / AST: 21 U/L / GGT: x           PT/INR - ( 12 Jan 2025 22:43 )   PT: 14.4 sec;   INR: 1.27 ratio       PTT - ( 14 Jan 2025 00:26 )  PTT:45.8 sec  ABG - ( 14 Jan 2025 00:03 )  pH, Arterial: 7.44  pH, Blood: x     /  pCO2: 39    /  pO2: 90    / HCO3: 26    / Base Excess: 2.2   /  SaO2: 99.0    ------------------------------------------------------------------------------------------------------------------------------  Assessment:  53 yo M s/p CABG x 3 on 12/30/24    Postop acute pulmonary insufficiency  Cardiogenic and Septic shock  Acute blood loss anemia  Ascites    ESRD on iHD   E coli bacteremia 2/2 pneumonia  Leukocytosis       Plan:  ***Neuro***  Postoperative acute pain control with Gabapentin and prns.  Trazadone nightly  PT ongoing.    ***Cardiovascular***  s/p CABG x 3  Remains on Dobutamine  A fib s/p cardioversion - Amiodarone, completing load.  Hydralazine for hypertension.  ASA / statin daily    ***Pulmonary***  Postoperative acute pulmonary insufficiency  Bronchoscopy 1/12 and will plan likely require bronchoscopy this afternoon.  Aggressive pulmonary clearance, Volera, mobilization, wean oxygen as able.    ***GI***  Tube feeds   Protonix for stress ulcer prophylaxis.   Ascites: Paracentesis 1/11    ***Renal***  ESRD - HD yesterday.  LUE AV fistula     ***ID***  Sepsis/septic shock due to ESBL E. coli - Continue Meropenem. Trend leukocytosis   Diflucan for prophylaxis   Also on Mitch nebs.  1/9 Bcx pending   1/7 Bcx negative to date  1/5 Bcx negative to date  1/3 Bronch ESBL E coli   1/2 Bcx ESBL E coli   PO Vancomycin for C diff prophylaxis     ***Endocrine***  Hyperglycemia - Insulin infusion    ***Hematology***  Acute blood loss anemia  No current transfusion indication.  Heparin infusion for recent a fib.  Epogen.      Care plan discussed with the ICU care team.   Patient remains critical, at risk for life threatening decompensation.    I have spent 50 minutes providing critical care management to this patient.    By signing my name below, I, Baldemar Hernandez, attest that this documentation has been prepared under the direction and in the presence of Jen Sierra MD.  Electronically signed: Baldemar Hernandez, 01-14-25 @ 06:33    I, Jen Sierra,  personally performed the services described in this documentation. All medical record entries made by the scribe were at my direction and in my presence. I have reviewed the chart and agree that the record reflects my personal performance and is accurate and complete  Electronically signed: Jen Sierra MD.

## 2025-01-14 NOTE — PROGRESS NOTE ADULT - SUBJECTIVE AND OBJECTIVE BOX
Patient is a 54y old  Male who presents with a chief complaint of CAD s/p CABG (14 Jan 2025 07:05)    Being followed by ID for        Interval history:  pt in chair c/o hoarse voice  still with NG tube  stool soft  productive cough  No other acute events        PAST MEDICAL & SURGICAL HISTORY:  Type 2 diabetes mellitus      Hypertension      End stage renal disease  started HD 2/2019 T, Th, Sat via right chest permacath      Bone spur  right shoulder- hx of - sx done      Anemia      Injury of right wrist  hx of at age 14      History of hyperkalemia  before HD- K-6.2 - to repeat in am of sx, pt had HD today      S/P arthroscopy of right shoulder  2010      History of vascular access device  right chest permacath - 2/2019      H/O right wrist surgery  tendons repair - at age 14      AV fistula      H/O ventral hernia repair      S/P cholecystectomy        Allergies    No Known Allergies    Intolerances      Antimicrobials:    fluconAZOLE IVPB 200 milliGRAM(s) IV Intermittent every 24 hours  meropenem  IVPB 500 milliGRAM(s) IV Intermittent every 24 hours  tobramycin for Nebulization 300 milliGRAM(s) Inhalation every 12 hours  vancomycin    Solution 125 milliGRAM(s) Oral every 12 hours    MEDICATIONS  (STANDING):  albuterol/ipratropium for Nebulization 3 milliLiter(s) Nebulizer every 6 hours  aMIOdarone    Tablet   Oral   aMIOdarone    Tablet 200 milliGRAM(s) Oral daily  aspirin  chewable 81 milliGRAM(s) Oral daily  atorvastatin 80 milliGRAM(s) Oral at bedtime  bisacodyl Suppository 10 milliGRAM(s) Rectal once  chlorhexidine 2% Cloths 1 Application(s) Topical daily  chlorhexidine 4% Liquid 1 Application(s) Topical <User Schedule>  dextrose 50% Injectable 50 milliLiter(s) IV Push every 15 minutes  dextrose 50% Injectable 25 milliLiter(s) IV Push every 15 minutes  DOBUTamine Infusion 1 MICROgram(s)/kG/Min (1.28 mL/Hr) IV Continuous <Continuous>  epoetin ignacia (EPOGEN) Injectable 41519 Unit(s) IV Push <User Schedule>  fluconAZOLE IVPB 200 milliGRAM(s) IV Intermittent every 24 hours  gabapentin 100 milliGRAM(s) Oral every 12 hours  heparin  Infusion 300 Unit(s)/Hr (13 mL/Hr) IV Continuous <Continuous>  insulin lispro (ADMELOG) corrective regimen sliding scale   SubCutaneous every 6 hours  insulin regular Infusion 2 Unit(s)/Hr (2 mL/Hr) IV Continuous <Continuous>  lidocaine   4% Patch 2 Patch Transdermal daily  meropenem  IVPB 500 milliGRAM(s) IV Intermittent every 24 hours  Nephro-elicia 1 Tablet(s) Oral daily  pantoprazole  Injectable 40 milliGRAM(s) IV Push daily  sodium chloride 0.65% Nasal 1 Spray(s) Both Nostrils every 12 hours  sodium chloride 0.9%. 1000 milliLiter(s) (10 mL/Hr) IV Continuous <Continuous>  sodium chloride 3%  Inhalation 4 milliLiter(s) Inhalation every 6 hours  tobramycin for Nebulization 300 milliGRAM(s) Inhalation every 12 hours  traZODone 100 milliGRAM(s) Oral at bedtime  vancomycin    Solution 125 milliGRAM(s) Oral every 12 hours      Vital Signs Last 24 Hrs  T(C): 36.7 (01-14-25 @ 08:00), Max: 37.1 (01-13-25 @ 16:00)  T(F): 98 (01-14-25 @ 08:00), Max: 98.8 (01-13-25 @ 16:00)  HR: 91 (01-14-25 @ 10:00) (79 - 91)  BP: --  BP(mean): --  RR: 13 (01-14-25 @ 10:00) (11 - 32)  SpO2: 97% (01-14-25 @ 10:00) (92% - 100%)    Physical Exam:    Constitutional in chair  awake    HEENT PERRLA EOMI,No pallor or icterus    No oral exudate or erythema    Neck supple no JVD or LN    Chest Good AE,CTA    CVS S1 S2     Abd soft BS normal No tenderness     Ext No cyanosis clubbing or edema    IV site no erythema tenderness or discharge Left AVF    Joints no swelling or LOM    CNS AAO X 3    Lab Data:                          9.5    21.76 )-----------( 292      ( 14 Jan 2025 00:26 )             29.4       01-14    136  |  97  |  46[H]  ----------------------------<  159[H]  4.0   |  24  |  3.81[H]    Ca    8.8      14 Jan 2025 00:26  Phos  4.0     01-14  Mg     2.2     01-14    TPro  6.1  /  Alb  3.1[L]  /  TBili  0.7  /  DBili  x   /  AST  21  /  ALT  26  /  AlkPhos  85  01-14              Bronchial  01-11-25 --  --  --      Bronchial  01-10-25   Commensal manjit consistent with body site  --    No polymorphonuclear leukocytes seen per low power field  No squamous epithelial cells seen per low power field  No organisms seen per oil power field      .Blood BLOOD  01-09-25   No growth at 4 days  --  --      .Blood BLOOD  01-07-25   No growth at 5 days  --  --          WBC Count: 21.76 (01-14-25 @ 00:26)  WBC Count: 21.73 (01-13-25 @ 00:26)  WBC Count: 21.67 (01-12-25 @ 00:30)  WBC Count: 29.61 (01-11-25 @ 00:32)  WBC Count: 33.53 (01-10-25 @ 00:59)  WBC Count: 27.85 (01-09-25 @ 00:31)  WBC Count: 25.63 (01-08-25 @ 00:28)       Bilirubin Total: 0.7 mg/dL (01-14-25 @ 00:26)  Aspartate Aminotransferase (AST/SGOT): 21 U/L (01-14-25 @ 00:26)  Alanine Aminotransferase (ALT/SGPT): 26 U/L (01-14-25 @ 00:26)  Alkaline Phosphatase: 85 U/L (01-14-25 @ 00:26)    Bilirubin Total: 1.0 mg/dL (01-13-25 @ 00:26)  Aspartate Aminotransferase (AST/SGOT): 23 U/L (01-13-25 @ 00:26)  Alanine Aminotransferase (ALT/SGPT): 36 U/L (01-13-25 @ 00:26)  Alkaline Phosphatase: 85 U/L (01-13-25 @ 00:26)    Bilirubin Total: 1.3 mg/dL (01-12-25 @ 00:30)  Aspartate Aminotransferase (AST/SGOT): 24 U/L (01-12-25 @ 00:30)  Alanine Aminotransferase (ALT/SGPT): 48 U/L (01-12-25 @ 00:30)  Alkaline Phosphatase: 91 U/L (01-12-25 @ 00:30)    Bilirubin Total: 1.0 mg/dL (01-11-25 @ 00:32)  Aspartate Aminotransferase (AST/SGOT): 40 U/L (01-11-25 @ 00:32)  Alanine Aminotransferase (ALT/SGPT): 88 U/L (01-11-25 @ 00:32)  Alkaline Phosphatase: 102 U/L (01-11-25 @ 00:32)  Bilirubin Total: 1.1 mg/dL (01-10-25 @ 00:59)  Aspartate Aminotransferase (AST/SGOT): 63 U/L (01-10-25 @ 00:59)  Alanine Aminotransferase (ALT/SGPT): 128 U/L (01-10-25 @ 00:59)  Alkaline Phosphatase: 131 U/L (01-10-25 @ 00:59)

## 2025-01-14 NOTE — PROGRESS NOTE ADULT - ASSESSMENT
54M with PMH of HTN, HLD, ESRD on HD MWF via LUE AVF, ascites (requiring repeat paracenteses q4-6wks), NICM with moderate LV dysfunction w/ EF 35-40%() and CAD s/p atherectomy + SALLIE to D1(2024) presents to [facility] transferred from [facility]. [facility] with shortness of breath. Of note he was recently admitted from - for acute cholecystitis s/p cholecystectomy. In [facility] ED, pt was hypoxic to 86% on RA, trop 1.7K, EKG no new changes, CXR fluid overload. Admitted for AHRF 2/2 fluid overload. Pt received HD on , ,  and . DAPT was resumed, TTE showed improvement in LVEF to 40-45%. Stress test was abnormal with 1mm inferior STD, reversible ischemia in the inferior wall and fixed defects in the lateral, anterior and apical walls with post stress EF of 24%.     Pt was then transferred to [facility] for cardiac cath which showed pLAD 70% ISR, mLCx 70%, D1 severe dz.     Patient now transferred to [facility] CTS Dr. Rivers for CABG eval.# CAD s/p NSTEMI  Hx CAD s/p PCI LAD   Presented with ADHF elevated troponins  Positive NST1-Cath- pLAD 70% ISR, mLCx 70%, D1 severe dz.   TTE EF 35% Severe TRWas on Plavix-p2y12- 321 been off Plavix since -POD#1-C3L free LIMA-LAD; SVG-Diag; AQP-taaoND-Tjxdcesxt on  Sinus rhythm,Amio for AF prophylaxis  -OOB off  Sinus rhythm   -POD#3-On /pressors-EF 25-30% RV failure with transaminitis-Sinus Sllgxz17/03-POD#4-Was intubated for hypoxia-gradual hypotension on /pressors had IABP inserted this morning  -POD#5-s/p IABP insertion, sepsis/septic shock due to GNR/ECOLI HD cath placed   Bronched yesterday 1/3 - minimal secretions with rust-tinged sputum     ESBL-E Coli bacteremia on meropenam    - POD#7 Atrial Fib ,remains intubated weaning IABP 1:3,off pressors on CRRT  -IABP removed.Remains on vent-Alex 10ppm,off Levo- CVP 10 / MAP 71 / Hct 25.6% / Lactate 1.7-Atrial Fibrillation  -Intubated on Alex 10ppm, gtt, BP better-AF~~90's on Amio-had bronch still febrile Tmax 101  -Extubated awake alert on HFNC AF,on Dobutrex-CRRT,Cultures no growth    01/10-OOB on BiPAP Sinus Rhythm on -SR/ MAP 57/ CVP 10/  Hct 26.7 / Lact 1.4  -s/p EDU/DCCV-Sinus rhythm on -SR / MAP 52 / CVP 12/ Hct 25.8 / Lact 0.7-had bronch with BAL Left lung  -Sinus rhythm on -for repeat Bronch-SR / MAP 67 / CVP 11 / Hct 27.4% / Lact 0.8    # Acute on Chronic Systolic Heart Failure  EF-35% ,severe TR  Had HD post op  GDMT post op  LVEF improved post bypass but now at 23-30%-BiV dysfunction on  now off pressors  -TTE EF 40%,trace effusion  ECG c/w pericarditis-on colchicine     Vascular evaluated  AVGraft /?steal causing RV failure-'' Very low suspicion of steal Sd and AVF causing current clinical state. ''   VA Duplex Hemodialysis Access, Left (25 @ 13:35) >   Arterial flow volume of 1301 mL/m, and the venous flow volume of  1492 mL/m are consistent with a normally functioning dialysis access  fistula.        # Ascites  Hx chronic ascites  Has drainage q4-6 weeks  Last drained -4600mL  ? ascites related to severe TR  Had jmqilepasecc-[place]-cu growth   CT Abdomen 01/10-Large volume ascites-consider paracentesis  Monitor      # ESRD  Now off CRRT transition to HD

## 2025-01-14 NOTE — CHART NOTE - NSCHARTNOTEFT_GEN_A_CORE
Brief Interim Note:    Patient seen for:   Consult from Wound Care NP Maryam Andrews to assess for Jeramy. Patient with hx of ESRD on HD and is currently receiving HD in-house; therefore patient not appropriate for Jeramy.  Patient also s/p FEES 1/13 with recommendations to continue NPO.    Current Diet:  Diet, NPO with Tube Feed:   Tube Feeding Modality: Nasogastric  Vital 1.5 Jeremias (VITAL1.5RTH)  Total Volume for 24 Hours (mL): 1440  Continuous  Starting Tube Feed Rate {mL per Hour}: 20  Increase Tube Feed Rate by (mL): 10  Until Goal Tube Feed Rate (mL per Hour): 60  Tube Feed Duration (in Hours): 24  Tube Feed Start Time: 13:00  No Carb Prosource TF     Qty per Day:  1 (01-07-25 @ 23:29) [Active]    Recommendations:  1. Enteral nutrition: as patient off pressors and previously tolerated Nepro, recommend changing formula to Nepro @ 20ml/hr, increase by 10ml q6hr to goal rate 60ml/hr x 24hr, provides 1440ml formula, 2592kcal, 117g protein, 1047ml free water; meets 33.2kcal/kg and 1.50g/kg protein based on IBW 172lb/78kg. Defer additional free water flushes to medical team.   2. Multivitamin/mineral supplementation: Continue Nephro-elicia for wound healing pending no medical contraindications.    Corinne Lima RDN, CDN - available via [facility] TEAMS

## 2025-01-14 NOTE — PROGRESS NOTE ADULT - ASSESSMENT
54M with PMH of HTN, HLD, ESRD on HD MWF via LUE AVF, ascites (requiring repeat paracenteses q4-6wks), NICM with moderate LV dysfunction w/ EF 35-40%(2023) and CAD s/p atherectomy + SALLIE to D1(4/11/2024) presents to [facility] transferred from [facility] for CABG eval  Nephro on [facility] for HD    ESRD on HD   Regular schedule MWF   Access LUE AVF  Center [facility]  Nephrologist Dr. Bailey  Last HD on 12/24  S/p HD on 12/27 12/29, 12/30 for hyperkalemia and 12/31  2 L PUF On 01/02/2024  However hospital course now complicated by Cardiogenic shock now on multiple pressors,   CRRT initiated 01/03, off since 01/08   S/p PUF on 01/09   HD held on 01/10 due to instability,  S/p HD on 01/11 2 L UF rmeoved  S/p HD on 01/13 HD tomm per cristin, no urgent needs today  Keep MAP>65  Renal Diet  Consent in physical chart    Hyper/Hypokalemia  Monitor K, will change dialysate fluid as needed    HTN  currently on pressure support  monitor bp     CKD MBD  Can send a PTH  Ca and Phos daily    Anemia  CRISTIANE with HD  Transfuse if hgb <7    CAD with multivessel disease  s/p CABG 12/30  CTICU following

## 2025-01-14 NOTE — PROGRESS NOTE ADULT - SUBJECTIVE AND OBJECTIVE BOX
MR#[id number]  PATIENT NAME:RAAD CANO    DATE OF SERVICE: 25 @ 07:06  Patient was seen and examined by Solo Quick MD on    25 @ 07:06 .  Interim events noted.Consultant notes ,Labs,Telemetry reviewed by me       HOSPITAL COURSE: HPI:  54M with PMH of HTN, HLD, ESRD on HD MWF via LUE AVF, ascites (requiring repeat paracenteses q4-6wks), NICM with moderate LV dysfunction w/ EF 35-40%() and CAD s/p atherectomy + SALLIE to D1(2024) presents to [facility] transferred from [facility]. Patient initially presented to [facility] ED with shortness of breath. Of note he was recently admitted from - for acute cholecystitis s/p cholecystectomy. In [facility] ED, pt was hypoxic to 86% on RA, trop 1.7K, EKG no new changes, CXR fluid overload. Admitted for AHRF 2/2 fluid overload. Pt received HD on , ,  and . DAPT was resumed, TTE showed improvement in LVEF to 40-45%. Stress test was abnormal with 1mm inferior STD, reversible ischemia in the inferior wall and fixed defects in the lateral, anterior and apical walls with post stress EF of 24%. Pt was then transferred to [facility] for cardiac cath which showed pLAD 70% ISR, mLCx 70%, D1 severe dz. Patient now transferred to [facility] CTS Dr. Rivers for CABG eval. Patient denies any current SOB or chest pain on admission. Otherwise denies headaches, abdominal pain, urinary or bowel changes, fevers, chills, N/V/D, sick contacts.  (24 Dec 2024 05:07)      INTERIM EVENTS:Patient seen at bedside ,interim events noted.   Cardiac cath  pLAD 70% ISR, mLCx 70%, D1 severe dz.   -POD#1-C3L free LIMA-LAD; SVG-Diag; SVG-leftPL Awake OOB extubated Sinus rhythm on Dobutamine gtt  - Was intubated last night for airway protection s/p bronchoscopy This morning had IABP inserted  remains sedated intubated Sinus Rhythm  - s/p IABP insertion, sepsis/septic shock due to GNR/ECOLI -Paracentesis for suspected bacterial peritonitis  Transfused 1u pbrcs overnight  Remains on inoptropes and pressors with IABP at 1:1  -Intunated on IABP 1:1,Alex@20ppm,on ,weaning pressors  -POD#7-Remains intubated on CRRT-IABP 1:3,off pressors on Dobutrex in Atrial Fibrillation  -Intibated on Alex 10ppm,IABP dce'd,off Levophed,remains on Dobutrex-Atrial Fibrillation  -Remains intubated had bronch earlier for cupious secretions-On Dobutrex,AF~~90's On CRRT  -Extubated on HFNC,no dyspnea alert remains in AF off CRRT  01/10-OOB Awake  on BiPAP +,  -Was cardioverted yesterday remains in Sinus rhythm-had Bronch earlier L lung mucus-on  On HFNC-40%/30L Trial HD  -OOB remains on HFNC for repeat bronch today-Sinus rhythm On /Vasopressin gtt   -OOB NGT,less dyspneac on  gtt        MEDICATIONS  (STANDING):  albuterol/ipratropium for Nebulization 3 milliLiter(s) Nebulizer every 6 hours  aMIOdarone    Tablet   Oral   aMIOdarone    Tablet 200 milliGRAM(s) Oral daily  aspirin  chewable 81 milliGRAM(s) Oral daily  atorvastatin 80 milliGRAM(s) Oral at bedtime  bisacodyl Suppository 10 milliGRAM(s) Rectal once  chlorhexidine 2% Cloths 1 Application(s) Topical daily  chlorhexidine 4% Liquid 1 Application(s) Topical <User Schedule>  dextrose 50% Injectable 50 milliLiter(s) IV Push every 15 minutes  dextrose 50% Injectable 25 milliLiter(s) IV Push every 15 minutes  DOBUTamine Infusion 1 MICROgram(s)/kG/Min (1.28 mL/Hr) IV Continuous <Continuous>  epoetin ignacia (EPOGEN) Injectable 26834 Unit(s) IV Push <User Schedule>  fluconAZOLE IVPB 200 milliGRAM(s) IV Intermittent every 24 hours  gabapentin 100 milliGRAM(s) Oral every 12 hours  heparin  Infusion 300 Unit(s)/Hr (13 mL/Hr) IV Continuous <Continuous>  insulin lispro (ADMELOG) corrective regimen sliding scale   SubCutaneous every 6 hours  insulin regular Infusion 2 Unit(s)/Hr (2 mL/Hr) IV Continuous <Continuous>  lidocaine   4% Patch 2 Patch Transdermal daily  meropenem  IVPB 500 milliGRAM(s) IV Intermittent every 24 hours  Nephro-elicia 1 Tablet(s) Oral daily  pantoprazole  Injectable 40 milliGRAM(s) IV Push daily  sodium chloride 0.65% Nasal 1 Spray(s) Both Nostrils every 12 hours  sodium chloride 0.9%. 1000 milliLiter(s) (10 mL/Hr) IV Continuous <Continuous>  sodium chloride 3%  Inhalation 4 milliLiter(s) Inhalation every 6 hours  tobramycin for Nebulization 300 milliGRAM(s) Inhalation every 12 hours  traZODone 100 milliGRAM(s) Oral at bedtime  vancomycin    Solution 125 milliGRAM(s) Oral every 12 hours    MEDICATIONS  (PRN):  benzocaine 20% Spray 1 Spray(s) Topical every 6 hours PRN throat pain  lidocaine/prilocaine Cream 1 Application(s) Topical daily PRN pre-HD  midodrine 10 milliGRAM(s) Oral every 8 hours PRN pre-HD  sodium chloride 0.9% lock flush 10 milliLiter(s) IV Push every 1 hour PRN Pre/post blood products, medications, blood draw, and to maintain line patency            REVIEW OF SYSTEMS:  Constitutional: [ ] fever, [ ]weight loss,  [ ]fatigue [ ]weight gain  Eyes: [ ] visual changes  Respiratory: [ ]shortness of breath;  [ ] cough, [ ]wheezing, [ ]chills, [ ]hemoptysis  Cardiovascular: [ ] chest pain, [ ]palpitations, [ ]dizziness,  [ ]leg swelling[ ]orthopnea[ ]PND  Gastrointestinal: [ ] abdominal pain, [ ]nausea, [ ]vomiting,  [ ]diarrhea [ ]Constipation [ ]Melena  Genitourinary: [ ] dysuria, [ ] hematuria [ ]Vigil  Neurologic: [ ] headaches [ ] tremors[ ]weakness [ ]Paralysis Right[ ] Left[ ]  Skin: [ ] itching, [ ]burning, [ ] rashes  Endocrine: [ ] heat or cold intolerance  Musculoskeletal: [ ] joint pain or swelling; [ ] muscle, back, or extremity pain  Psychiatric: [ ] depression, [ ]anxiety, [ ]mood swings, or [ ]difficulty sleeping  Hematologic: [ ] easy bruising, [ ] bleeding gums    [ ] All remaining systems negative except as per above.   [ ]Unable to obtain.  [x] No change in ROS since admission      Vital Signs Last 24 Hrs  T(C): 36.9 (2025 04:00), Max: 37.1 (2025 16:00)  T(F): 98.4 (2025 04:00), Max: 98.8 (2025 16:00)  HR: 83 (2025 06:00) (78 - 88)  BP: 102/56 (2025 08:00) (102/56 - 102/56)  BP(mean): 76 (2025 08:00) (76 - 76)  RR: 23 (2025 06:00) (11 - 29)  SpO2: 100% (2025 05:57) (92% - 100%)    Parameters below as of 2025 05:57  Patient On (Oxygen Delivery Method): nasal cannula      I&O's Summary    2025 07:01  -  2025 07:00  --------------------------------------------------------  IN: 1848.7 mL / OUT: 2000 mL / NET: -151.3 mL        PHYSICAL EXAM:  General: No acute distress BMI-28  HEENT: EOMI, PERRL  Neck: Supple, [ ] JVD  Lungs: Equal air entry bilaterally; [ ] rales [ ] wheezing [ ] rhonchi  Heart: Regular rate and rhythm; [x ] murmur   2/6 [ x] systolic [ ] diastolic [ ] radiation[ ] rubs [ ]  gallops  Abdomen: Nontender, bowel sounds present  Extremities: No clubbing, cyanosis, [ ] edema [ ]Pulses  equal and intact  Nervous system:  Alert & Oriented X3, no focal deficits  Psychiatric: Normal affect  Skin: No rashes or lesions    LABS:      136  |  97  |  46[H]  ----------------------------<  159[H]  4.0   |  24  |  3.81[H]    Ca    8.8      2025 00:26  Phos  4.0       Mg     2.2         TPro  6.1  /  Alb  3.1[L]  /  TBili  0.7  /  DBili  x   /  AST  21  /  ALT  26  /  AlkPhos  85      Creatinine Trend: 3.81<--, 4.59<--, 3.21<--, 4.10<--, 2.98<--, 1.99<--                        9.5    21.76 )-----------( 292      ( 2025 00:26 )             29.4     PT/INR - ( 2025 22:43 )   PT: 14.4 sec;   INR: 1.27 ratio         PTT - ( 2025 00:26 )  PTT:45.8 sec      Xray Chest 1 View- PORTABLE-Urgent (Xray Chest 1 View- PORTABLE-Urgent .) (25 @ 10:47) >  IMPRESSION:  Enteric tube coursing to the GE junction, recommend advancing.  Opacity overlying the left lung, stable.         EDU W or WO Ultrasound Enhancing Agent (01.10.25 @ 11:57) >  CONCLUSIONS:      1. Bedside Limited EDU in CTU. No gastric Images obtained.   2. No evidence of left atrial or left atrial appendage thrombus.   3. Left ventricular systolic function is severely decreased.   4. Enlarged right ventricular cavity size and reduced right ventricular systolic function.   5. Tricuspid annular dilation. Moderate tricuspid regurgitation.   6. No obvious echocardiographic evidence of vegetations.    12 Lead ECG (01.10.25 @ 12:30) >   SINUS RHYTHM WITH 1ST DEGREE A-V BLOCK  LEFT AXIS DEVIATION  NON-SPECIFIC INTRA-VENTRICULAR CONDUCTION BLOCK  MINIMAL VOLTAGE CRITERIA FOR LVH, MAY BE NORMAL VARIANT ( [place] product )  ABNORMAL ECG

## 2025-01-15 ENCOUNTER — RESULT REVIEW (OUTPATIENT)
Age: 55
End: 2025-01-15

## 2025-01-15 LAB
ALBUMIN SERPL ELPH-MCNC: 2.9 G/DL — LOW (ref 3.3–5)
ALP SERPL-CCNC: 88 U/L — SIGNIFICANT CHANGE UP (ref 40–120)
ALT FLD-CCNC: 23 U/L — SIGNIFICANT CHANGE UP (ref 10–45)
ANION GAP SERPL CALC-SCNC: 15 MMOL/L — SIGNIFICANT CHANGE UP (ref 5–17)
APTT BLD: 42.5 SEC — HIGH (ref 24.5–35.6)
AST SERPL-CCNC: 31 U/L — SIGNIFICANT CHANGE UP (ref 10–40)
BASE EXCESS BLDV CALC-SCNC: -2.4 MMOL/L — LOW (ref -2–3)
BASOPHILS # BLD AUTO: 0.03 K/UL — SIGNIFICANT CHANGE UP (ref 0–0.2)
BASOPHILS NFR BLD AUTO: 0.2 % — SIGNIFICANT CHANGE UP (ref 0–2)
BILIRUB SERPL-MCNC: 0.7 MG/DL — SIGNIFICANT CHANGE UP (ref 0.2–1.2)
BUN SERPL-MCNC: 66 MG/DL — HIGH (ref 7–23)
CALCIUM SERPL-MCNC: 8.3 MG/DL — LOW (ref 8.4–10.5)
CHLORIDE SERPL-SCNC: 97 MMOL/L — SIGNIFICANT CHANGE UP (ref 96–108)
CO2 BLDV-SCNC: 24 MMOL/L — SIGNIFICANT CHANGE UP (ref 22–26)
CO2 SERPL-SCNC: 23 MMOL/L — SIGNIFICANT CHANGE UP (ref 22–31)
CREAT SERPL-MCNC: 4.91 MG/DL — HIGH (ref 0.5–1.3)
EGFR: 13 ML/MIN/1.73M2 — LOW
EGFR: 13 ML/MIN/1.73M2 — LOW
EOSINOPHIL # BLD AUTO: 0.08 K/UL — SIGNIFICANT CHANGE UP (ref 0–0.5)
EOSINOPHIL NFR BLD AUTO: 0.5 % — SIGNIFICANT CHANGE UP (ref 0–6)
GAS PNL BLDA: SIGNIFICANT CHANGE UP
GAS PNL BLDV: SIGNIFICANT CHANGE UP
GAS PNL BLDV: SIGNIFICANT CHANGE UP
GLUCOSE SERPL-MCNC: 192 MG/DL — HIGH (ref 70–99)
HCO3 BLDV-SCNC: 23 MMOL/L — SIGNIFICANT CHANGE UP (ref 22–29)
HCT VFR BLD CALC: 29.4 % — LOW (ref 39–50)
HGB BLD-MCNC: 9.4 G/DL — LOW (ref 13–17)
HOROWITZ INDEX BLDV+IHG-RTO: 100 — SIGNIFICANT CHANGE UP
IMM GRANULOCYTES NFR BLD AUTO: 0.7 % — SIGNIFICANT CHANGE UP (ref 0–0.9)
LYMPHOCYTES # BLD AUTO: 1.7 % — LOW (ref 13–44)
MAGNESIUM SERPL-MCNC: 2.1 MG/DL — SIGNIFICANT CHANGE UP (ref 1.6–2.6)
MCHC RBC-ENTMCNC: 29.6 PG — SIGNIFICANT CHANGE UP (ref 27–34)
MCHC RBC-ENTMCNC: 32 G/DL — SIGNIFICANT CHANGE UP (ref 32–36)
MONOCYTES NFR BLD AUTO: 5.3 % — SIGNIFICANT CHANGE UP (ref 2–14)
NEUTROPHILS NFR BLD AUTO: 91.6 % — HIGH (ref 43–77)
NRBC # BLD: 0 /100 WBCS — SIGNIFICANT CHANGE UP (ref 0–0)
NRBC BLD-RTO: 0 /100 WBCS — SIGNIFICANT CHANGE UP (ref 0–0)
PCO2 BLDV: 42 MMHG — SIGNIFICANT CHANGE UP (ref 42–55)
PH BLDV: 7.35 — SIGNIFICANT CHANGE UP (ref 7.32–7.43)
PLATELET # BLD AUTO: 329 K/UL — SIGNIFICANT CHANGE UP (ref 150–400)
PO2 BLDV: 71 MMHG — HIGH (ref 25–45)
POTASSIUM SERPL-MCNC: 4.8 MMOL/L — SIGNIFICANT CHANGE UP (ref 3.5–5.3)
POTASSIUM SERPL-SCNC: 4.8 MMOL/L — SIGNIFICANT CHANGE UP (ref 3.5–5.3)
PROT SERPL-MCNC: 5.8 G/DL — LOW (ref 6–8.3)
PROTHROM AB SERPL-ACNC: 16.8 SEC — HIGH (ref 9.9–13.4)
RBC # BLD: 3.18 M/UL — LOW (ref 4.2–5.8)
RBC # FLD: 20.8 % — HIGH (ref 10.3–14.5)
SAO2 % BLDV: 96.8 % — HIGH (ref 67–88)
SODIUM SERPL-SCNC: 135 MMOL/L — SIGNIFICANT CHANGE UP (ref 135–145)
WBC # BLD: 17.07 K/UL — HIGH (ref 3.8–10.5)
WBC # FLD AUTO: 17.07 K/UL — HIGH (ref 3.8–10.5)
WRIGHT STN STL: NEGATIVE — SIGNIFICANT CHANGE UP

## 2025-01-15 PROCEDURE — 71045 X-RAY EXAM CHEST 1 VIEW: CPT | Mod: 26

## 2025-01-15 PROCEDURE — G0545: CPT

## 2025-01-15 PROCEDURE — 31646 BRNCHSC W/THER ASPIR SBSQ: CPT

## 2025-01-15 PROCEDURE — 99291 CRITICAL CARE FIRST HOUR: CPT

## 2025-01-15 PROCEDURE — 99233 SBSQ HOSP IP/OBS HIGH 50: CPT | Mod: 25

## 2025-01-15 PROCEDURE — 99232 SBSQ HOSP IP/OBS MODERATE 35: CPT

## 2025-01-15 PROCEDURE — 99232 SBSQ HOSP IP/OBS MODERATE 35: CPT | Mod: 25

## 2025-01-15 PROCEDURE — 99233 SBSQ HOSP IP/OBS HIGH 50: CPT

## 2025-01-15 PROCEDURE — 93308 TTE F-UP OR LMTD: CPT | Mod: 26

## 2025-01-15 RX ORDER — SUGAMMADEX 100 MG/ML
200 INJECTION, SOLUTION INTRAVENOUS ONCE
Refills: 0 | Status: COMPLETED | OUTPATIENT
Start: 2025-01-15 | End: 2025-01-15

## 2025-01-15 RX ORDER — MIDAZOLAM IN 0.9 % SOD.CHLORID 1 MG/ML
1 PLASTIC BAG, INJECTION (ML) INTRAVENOUS ONCE
Refills: 0 | Status: DISCONTINUED | OUTPATIENT
Start: 2025-01-15 | End: 2025-01-15

## 2025-01-15 RX ORDER — ROCURONIUM BROMIDE 10 MG/ML
100 INJECTION, SOLUTION INTRAVENOUS ONCE
Refills: 0 | Status: COMPLETED | OUTPATIENT
Start: 2025-01-15 | End: 2025-01-15

## 2025-01-15 RX ORDER — DOBUTAMINE 250 MG/20ML
1 INJECTION INTRAVENOUS
Qty: 500 | Refills: 0 | Status: DISCONTINUED | OUTPATIENT
Start: 2025-01-15 | End: 2025-01-18

## 2025-01-15 RX ORDER — DEXMEDETOMIDINE HYDROCHLORIDE IN SODIUM CHLORIDE 4 UG/ML
0.5 INJECTION INTRAVENOUS
Qty: 200 | Refills: 0 | Status: DISCONTINUED | OUTPATIENT
Start: 2025-01-15 | End: 2025-01-17

## 2025-01-15 RX ORDER — KETAMINE HCL IN 0.9 % NACL 50 MG/5 ML
50 SYRINGE (ML) INTRAVENOUS ONCE
Refills: 0 | Status: DISCONTINUED | OUTPATIENT
Start: 2025-01-15 | End: 2025-01-15

## 2025-01-15 RX ORDER — ACETYLCYSTEINE 200 MG/ML
4 INHALANT RESPIRATORY (INHALATION) EVERY 12 HOURS
Refills: 0 | Status: DISCONTINUED | OUTPATIENT
Start: 2025-01-15 | End: 2025-01-16

## 2025-01-15 RX ORDER — PROPOFOL 10 MG/ML
20 INJECTION, EMULSION INTRAVENOUS
Qty: 1000 | Refills: 0 | Status: DISCONTINUED | OUTPATIENT
Start: 2025-01-15 | End: 2025-01-15

## 2025-01-15 RX ORDER — LIDOCAINE HCL/PF 10 MG/ML
25 VIAL (ML) INJECTION ONCE
Refills: 0 | Status: COMPLETED | OUTPATIENT
Start: 2025-01-15 | End: 2025-01-15

## 2025-01-15 RX ORDER — FENTANYL CITRATE-0.9 % NACL/PF 100MCG/2ML
50 SYRINGE (ML) INTRAVENOUS ONCE
Refills: 0 | Status: DISCONTINUED | OUTPATIENT
Start: 2025-01-15 | End: 2025-01-15

## 2025-01-15 RX ORDER — PROPOFOL 10 MG/ML
40 INJECTION, EMULSION INTRAVENOUS ONCE
Refills: 0 | Status: DISCONTINUED | OUTPATIENT
Start: 2025-01-15 | End: 2025-01-17

## 2025-01-15 RX ADMIN — GABAPENTIN 100 MILLIGRAM(S): 400 CAPSULE ORAL at 17:58

## 2025-01-15 RX ADMIN — IPRATROPIUM BROMIDE AND ALBUTEROL SULFATE 3 MILLILITER(S): .5; 2.5 SOLUTION RESPIRATORY (INHALATION) at 17:39

## 2025-01-15 RX ADMIN — IPRATROPIUM BROMIDE AND ALBUTEROL SULFATE 3 MILLILITER(S): .5; 2.5 SOLUTION RESPIRATORY (INHALATION) at 23:01

## 2025-01-15 RX ADMIN — Medication 50 MICROGRAM(S): at 13:35

## 2025-01-15 RX ADMIN — IPRATROPIUM BROMIDE AND ALBUTEROL SULFATE 3 MILLILITER(S): .5; 2.5 SOLUTION RESPIRATORY (INHALATION) at 11:08

## 2025-01-15 RX ADMIN — Medication 1 APPLICATION(S): at 21:20

## 2025-01-15 RX ADMIN — Medication 125 MILLIGRAM(S): at 05:10

## 2025-01-15 RX ADMIN — Medication 81 MILLIGRAM(S): at 11:59

## 2025-01-15 RX ADMIN — Medication 4 MILLILITER(S): at 17:38

## 2025-01-15 RX ADMIN — Medication 1 APPLICATION(S): at 05:12

## 2025-01-15 RX ADMIN — DOBUTAMINE 2.55 MICROGRAM(S)/KG/MIN: 250 INJECTION INTRAVENOUS at 11:59

## 2025-01-15 RX ADMIN — Medication 25 MILLIGRAM(S): at 13:36

## 2025-01-15 RX ADMIN — Medication 125 MILLIGRAM(S): at 17:59

## 2025-01-15 RX ADMIN — Medication 1 SPRAY(S): at 17:59

## 2025-01-15 RX ADMIN — Medication 4 MILLILITER(S): at 11:08

## 2025-01-15 RX ADMIN — EPOETIN ALFA 10000 UNIT(S): 10000 SOLUTION INTRAVENOUS; SUBCUTANEOUS at 16:20

## 2025-01-15 RX ADMIN — ROCURONIUM BROMIDE 100 MILLIGRAM(S): 10 INJECTION, SOLUTION INTRAVENOUS at 12:30

## 2025-01-15 RX ADMIN — Medication 4 MILLILITER(S): at 23:01

## 2025-01-15 RX ADMIN — DOBUTAMINE 1.28 MICROGRAM(S)/KG/MIN: 250 INJECTION INTRAVENOUS at 07:26

## 2025-01-15 RX ADMIN — Medication 4 MILLILITER(S): at 05:06

## 2025-01-15 RX ADMIN — Medication 40 MILLIGRAM(S): at 11:59

## 2025-01-15 RX ADMIN — Medication 10 MILLIGRAM(S): at 05:11

## 2025-01-15 RX ADMIN — Medication 50 MILLIGRAM(S): at 01:55

## 2025-01-15 RX ADMIN — Medication 15 MILLILITER(S): at 17:59

## 2025-01-15 RX ADMIN — Medication 100 MILLIGRAM(S): at 21:17

## 2025-01-15 RX ADMIN — BENZOCAINE 1 SPRAY(S): 220 SPRAY, METERED PERIODONTAL at 05:11

## 2025-01-15 RX ADMIN — ATORVASTATIN CALCIUM 80 MILLIGRAM(S): 80 TABLET, FILM COATED ORAL at 21:17

## 2025-01-15 RX ADMIN — TOBRAMYCIN 300 MILLIGRAM(S): 300 SOLUTION RESPIRATORY (INHALATION) at 17:38

## 2025-01-15 RX ADMIN — GABAPENTIN 100 MILLIGRAM(S): 400 CAPSULE ORAL at 05:11

## 2025-01-15 RX ADMIN — DOBUTAMINE 5.11 MICROGRAM(S)/KG/MIN: 250 INJECTION INTRAVENOUS at 19:48

## 2025-01-15 RX ADMIN — HEPARIN SODIUM 13 UNIT(S)/HR: 1000 INJECTION INTRAVENOUS; SUBCUTANEOUS at 07:26

## 2025-01-15 RX ADMIN — IPRATROPIUM BROMIDE AND ALBUTEROL SULFATE 3 MILLILITER(S): .5; 2.5 SOLUTION RESPIRATORY (INHALATION) at 05:05

## 2025-01-15 RX ADMIN — Medication 1 TABLET(S): at 12:00

## 2025-01-15 RX ADMIN — Medication 2 UNIT(S)/HR: at 07:27

## 2025-01-15 RX ADMIN — DEXMEDETOMIDINE HYDROCHLORIDE IN SODIUM CHLORIDE 10.6 MICROGRAM(S)/KG/HR: 4 INJECTION INTRAVENOUS at 13:36

## 2025-01-15 RX ADMIN — MEROPENEM 100 MILLIGRAM(S): 1 INJECTION INTRAVENOUS at 21:17

## 2025-01-15 RX ADMIN — TOBRAMYCIN 300 MILLIGRAM(S): 300 SOLUTION RESPIRATORY (INHALATION) at 05:05

## 2025-01-15 RX ADMIN — Medication 50 MICROGRAM(S): at 13:45

## 2025-01-15 RX ADMIN — ACETYLCYSTEINE 4 MILLILITER(S): 200 INHALANT RESPIRATORY (INHALATION) at 17:38

## 2025-01-15 RX ADMIN — Medication 10 MILLIGRAM(S): at 21:17

## 2025-01-15 RX ADMIN — Medication 1 SPRAY(S): at 05:11

## 2025-01-15 RX ADMIN — AMIODARONE HYDROCHLORIDE 200 MILLIGRAM(S): 50 INJECTION, SOLUTION INTRAVENOUS at 05:10

## 2025-01-15 RX ADMIN — SUGAMMADEX 200 MILLIGRAM(S): 100 INJECTION, SOLUTION INTRAVENOUS at 13:45

## 2025-01-15 NOTE — PROGRESS NOTE ADULT - ASSESSMENT
54M with PMH of HTN, HLD, ESRD on HD MWF via LUE AVF, ascites (requiring repeat paracenteses q4-6wks), NICM with moderate LV dysfunction w/ EF 35-40%(2023) and CAD s/p atherectomy + SALLIE to D1(4/11/2024) presents to [facility] transferred from [facility] for CABG eval  Nephro on [facility] for HD    ESRD on HD   Regular schedule MWF   Access LUE AVF  Center [facility]  Nephrologist Dr. Bailey  Last HD on 12/24  S/p HD on 12/27 12/29, 12/30 for hyperkalemia and 12/31  2 L PUF On 01/02/2024  However hospital course now complicated by Cardiogenic shock now on multiple pressors,   CRRT initiated 01/03, off since 01/08   S/p PUF on 01/09   HD held on 01/10 due to instability,  S/p HD on 01/11 2 L UF rmeoved  S/p HD on 01/13   HD today, on minimal pressor  Keep MAP>65  Renal Diet  Consent in physical chart    Hyper/Hypokalemia  Monitor K, will change dialysate fluid as needed    HTN  currently on pressure support  monitor bp     CKD MBD  Can send a PTH  Ca and Phos daily    Anemia  CRISTIANE with HD  Transfuse if hgb <7    CAD with multivessel disease  s/p CABG 12/30  CTICU following

## 2025-01-15 NOTE — PROGRESS NOTE ADULT - SUBJECTIVE AND OBJECTIVE BOX
12/30/2024 - CABG x 3    on meropenem  heparin infusion was stopped this morning at 1100  on ASA 81 mg    intubated with 7.5 ET tube  on mechanical vent (20 / 15/+10 / 100%)  trachea palpable with neck in neutral position  no innominate artery palpable    NG feeding tube in right nostril with Nepro held @ 0600      plats - 329    1/15 coags  -INR - 1.4  -ptt - 42.5 sec (on heparin infusion)    CT abd/pelvis w/o contrast (12/27/2024) - entire stomach is in abdomen. moderate volume of ascites (I personally reviewed the images)

## 2025-01-15 NOTE — PROGRESS NOTE ADULT - ASSESSMENT
Assessment  54 year old male with HTN, HLD, ESRD on HD, ascites, CHF, and CAD s/p PCI (4/2024) transferred to [facility] from [facility] for cardiac catheterization due to abnormal stress test. Cath showed multivessel disease now s/p CABG 12/30/24. Course c/b acute on chronic hypoxemic respiratory failure in setting of recurrent mucus plugging on left s/p multiple bronchoscopies.     overall pt is improving and doing better with airway clearance.    Recommendations  - Aggressive pulmonary toilet  - duonebs q6h  - 3% saline nebs q6h  - chest pt   - c/w  Volara to airway clearance regimen   - Will need bronchoscopy given new opacification of the left hemithorax. Will coordinate with ICU team.       transplant pulm to follow

## 2025-01-15 NOTE — PROGRESS NOTE ADULT - SUBJECTIVE AND OBJECTIVE BOX
Patient seen and examined at the bedside.    Remains critically ill on continuous ICU monitoring.      Brief Summary:  55 yo M with multiple medical problems including CAD s/p PCI in April 2024 s/p CABG x 3 on 12/30/24 1/2: Intubated for hypoxia & left lung white out s/p awake bronch with removal of copious mucopurulent secretions  1/4: s/p IABP insertion, sepsis/septic shock due to E coli     24 Hour events:  HD yesterday for 2 liters removed.      Objective:  Vital Signs Last 24 Hrs  T(C): 37.6 (15 Aquilino 2025 04:00), Max: 37.7 (14 Jan 2025 20:00)  T(F): 99.7 (15 Aquilino 2025 04:00), Max: 99.8 (14 Jan 2025 20:00)  HR: 84 (15 Aquilino 2025 05:39) (81 - 94)  BP: 113/56 (15 Aquilino 2025 05:00) (112/56 - 155/76)  BP(mean): 80 (15 [place] 2025 05:00) (76 - 108)  RR: 22 (15 Aquilino 2025 05:00) (12 - 35)  SpO2: 100% (15 Aquilino 2025 05:39) (92% - 100%)    Parameters below as of 15 Aquilino 2025 05:39  Patient On (Oxygen Delivery Method): nasal cannula, high flow              Physical Exam:   General: Alert and interactive   Neurology: Oriented, following commands  Respiratory: Diminished at bases  CV: Sinus  Abdominal: Soft, Nontender  Extremities: Warm, well-perfused  -------------------------------------------------------------------------------------------------------------------------------    Labs:                        9.4    17.07 )-----------( 329      ( 15 Aquilino 2025 00:22 )             29.4     01-15    135  |  97  |  66[H]  ----------------------------<  192[H]  4.8   |  23  |  4.91[H]    Ca    8.3[L]      15 Aquilino 2025 00:22  Phos  4.4     01-15  Mg     2.1     01-15    TPro  5.8[L]  /  Alb  2.9[L]  /  TBili  0.7  /  DBili  x   /  AST  31  /  ALT  23  /  AlkPhos  88  01-15    LIVER FUNCTIONS - ( 15 Aquilino 2025 00:22 )  Alb: 2.9 g/dL / Pro: 5.8 g/dL / ALK PHOS: 88 U/L / ALT: 23 U/L / AST: 31 U/L / GGT: x           PT/INR - ( 15 Aquilino 2025 00:22 )   PT: 16.8 sec;   INR: 1.48 ratio         PTT - ( 15 Aquilino 2025 00:22 )  PTT:42.5 sec  ABG - ( 15 Aquilino 2025 00:14 )  pH, Arterial: 7.44  pH, Blood: x     /  pCO2: 36    /  pO2: 90    / HCO3: 24    / Base Excess: 0.5   /  SaO2: 98.9    ------------------------------------------------------------------------------------------------------------------------------  Assessment:  55 yo M s/p CABG x 3 on 12/30/24    Postop acute pulmonary insufficiency  Cardiogenic and Septic shock  Acute blood loss anemia  Ascites    ESRD on iHD   E coli bacteremia 2/2 pneumonia  Leukocytosis       Plan:  ***Neuro***  Postoperative acute pain control with Gabapentin and prns.  Trazadone nightly  PT ongoing.    ***Cardiovascular***  s/p CABG x 3  Remains on Dobutamine  A fib s/p cardioversion - Amiodarone, completing load.  Hydralazine for hypertension.  ASA / statin daily    ***Pulmonary***  Postoperative acute pulmonary insufficiency  Bronchoscopy 1/12 and will plan likely require bronchoscopy this afternoon.  Aggressive pulmonary clearance, Vol[place], mobilization, wean oxygen as able.    ***GI***  Tube feeds   Protonix for stress ulcer prophylaxis.   Ascites: Paracentesis 1/11    ***Renal***  ESRD - HD yesterday.  LUE AV fistula     ***ID***  Sepsis/septic shock due to ESBL E. coli - Continue Meropenem. Trend leukocytosis   Diflucan for prophylaxis   Also on Mitch nebs.  1/9 Bcx pending   1/7 Bcx negative to date  1/5 Bcx negative to date  1/3 Bronch ESBL E coli   1/2 Bcx ESBL E coli   PO Vancomycin for C diff prophylaxis     ***Endocrine***  Hyperglycemia - Insulin infusion    ***Hematology***  Acute blood loss anemia  No current transfusion indication.  Heparin infusion for recent a fib.  Epogen.      Care plan discussed with the ICU care team.   Patient remains critical, at risk for life threatening decompensation.    I have spent 30 minutes providing critical care management to this patient.    By signing my name below, I, Sonali Valel, attest that this documentation has been prepared under the direction and in the presence of Jen Sierra MD.  Electronically signedSonali Valle, 01-15-25 @ 06:28    I, Jen Sierra,  personally performed the services described in this documentation. all medical record entries made by the scribe were at my direction and in my presence. I have reviewed the chart and agree that the record reflects my personal performance and is accurate and complete  Electronically signed: Jen Sierra MD. Patient seen and examined at the bedside.    Remains critically ill on continuous ICU monitoring.      Brief Summary:  55 yo M with multiple medical problems including CAD s/p PCI in April 2024 s/p CABG x 3 on 12/30/24 1/2: Intubated for hypoxia & left lung white out s/p awake bronch with removal of copious mucopurulent secretions  1/4: s/p IABP insertion, sepsis/septic shock due to E coli     24 Hour events:  Placed on high flow nasal cannula overnight.  Left lung kushal out. TF held for bronchoscopy today.      Objective:  Vital Signs Last 24 Hrs  T(C): 37.6 (15 Aquilino 2025 04:00), Max: 37.7 (14 Jan 2025 20:00)  T(F): 99.7 (15 Aquilino 2025 04:00), Max: 99.8 (14 Jan 2025 20:00)  HR: 84 (15 Aquilino 2025 05:39) (81 - 94)  BP: 113/56 (15 Aquilino 2025 05:00) (112/56 - 155/76)  BP(mean): 80 (15 Aquilino 2025 05:00) (76 - 108)  RR: 22 (15 Aquilino 2025 05:00) (12 - 35)  SpO2: 100% (15 Aquilino 2025 05:39) (92% - 100%)    Parameters below as of 15 Aquilino 2025 05:39  Patient On (Oxygen Delivery Method): nasal cannula, high flow              Physical Exam:   General: Alert and interactive, able to ambulate.   Neurology: Oriented, following commands  Respiratory: Diminished on left.  CV: Sinus  Abdominal: Soft, Nontender  Extremities: Warm, well-perfused  -------------------------------------------------------------------------------------------------------------------------------    Labs:                        9.4    17.07 )-----------( 329      ( 15 Aquilino 2025 00:22 )             29.4     01-15    135  |  97  |  66[H]  ----------------------------<  192[H]  4.8   |  23  |  4.91[H]    Ca    8.3[L]      15 Aquilino 2025 00:22  Phos  4.4     01-15  Mg     2.1     01-15    TPro  5.8[L]  /  Alb  2.9[L]  /  TBili  0.7  /  DBili  x   /  AST  31  /  ALT  23  /  AlkPhos  88  01-15    LIVER FUNCTIONS - ( 15 Aquilino 2025 00:22 )  Alb: 2.9 g/dL / Pro: 5.8 g/dL / ALK PHOS: 88 U/L / ALT: 23 U/L / AST: 31 U/L / GGT: x           PT/INR - ( 15 Aquilino 2025 00:22 )   PT: 16.8 sec;   INR: 1.48 ratio         PTT - ( 15 Aquilino 2025 00:22 )  PTT:42.5 sec  ABG - ( 15 Aquilino 2025 00:14 )  pH, Arterial: 7.44  pH, Blood: x     /  pCO2: 36    /  pO2: 90    / HCO3: 24    / Base Excess: 0.5   /  SaO2: 98.9    ------------------------------------------------------------------------------------------------------------------------------  Assessment:  55 yo M s/p CABG x 3 on 12/30/24    Postop acute pulmonary insufficiency  Cardiogenic and Septic shock  Acute blood loss anemia  Ascites    ESRD on iHD   E coli bacteremia 2/2 pneumonia  Leukocytosis       Plan:  ***Neuro***  Postoperative acute pain control with Gabapentin and prns.  Trazadone nightly  PT ongoing.    ***Cardiovascular***  s/p CABG x 3  Remains on Dobutamine  A fib s/p cardioversion - Amiodarone, completing load.  Hydralazine for hypertension.  ASA / statin daily    ***Pulmonary***  Postoperative acute pulmonary insufficiency  Bronchoscopy today.  Aggressive pulmonary clearance, [place], mobilization, wean oxygen as able.    ***GI***  Tube feeds   Protonix for stress ulcer prophylaxis.   Ascites: Last paracentesis 1/11    ***Renal***  ESRD - HD today.  LUE AV fistula     ***ID***  Sepsis/septic shock due to ESBL E. coli - Continue Meropenem. Trend leukocytosis   Diflucan empirically.  Also on Mitch nebs.  PO Vancomycin for C diff prophylaxis     ***Endocrine***  Hyperglycemia - Insulin infusion    ***Hematology***  Acute blood loss anemia  No current transfusion indication.  Heparin infusion for recent a fib.  Epogen.      Care plan discussed with the ICU care team.   Patient remains critical, at risk for life threatening decompensation.    I have spent 65 minutes providing critical care management to this patient.    By signing my name below, I, Sonali Valle, attest that this documentation has been prepared under the direction and in the presence of Jen Sierra MD.  Electronically signed: Sonali Valle, 01-15-25 @ 06:28    I, Jen Sierra, personally performed the services described in this documentation. All medical record entries made by the scribe were at my direction and in my presence. I have reviewed the chart and agree that the record reflects my personal performance and is accurate and complete  Electronically signed: Jen Sierra MD. Patient seen and examined at the bedside.    Remains critically ill on continuous ICU monitoring.      Brief Summary:  55 yo M with multiple medical problems including CAD s/p PCI in April 2024 s/p CABG x 3 on 12/30/24 1/2: Intubated for hypoxia & left lung white out s/p awake bronch with removal of copious mucopurulent secretions  1/4: s/p IABP insertion, sepsis/septic shock due to E coli     24 Hour events:  Placed on high flow nasal cannula overnight.  Left lung kushal out. TF held for bronchoscopy today.      Objective:  Vital Signs Last 24 Hrs  T(C): 37.6 (15 Aquilino 2025 04:00), Max: 37.7 (14 Jan 2025 20:00)  T(F): 99.7 (15 Aquilino 2025 04:00), Max: 99.8 (14 Jan 2025 20:00)  HR: 84 (15 Aquil[place] 2025 05:39) (81 - 94)  BP: 113/56 (15 Aquilino 2025 05:00) (112/56 - 155/76)  BP(mean): 80 (15 Aqu[place] 2025 05:00) (76 - 108)  RR: 22 (15 Aquil[place] 2025 05:00) (12 - 35)  SpO2: 100% (15 Aquil[place] 2025 05:39) (92% - 100%)    Parameters below as of 15 Aquilino 2025 05:39  Patient On (Oxygen Delivery Method): nasal cannula, high flow              Physical Exam:   General: Alert and interactive, able to ambulate.   Neurology: Oriented, following commands  Respiratory: Diminished on left.  CV: Sinus  Abdominal: Soft, Nontender  Extremities: Warm, well-perfused  -------------------------------------------------------------------------------------------------------------------------------    Labs:                        9.4    17.07 )-----------( 329      ( 15 Aquilino 2025 00:22 )             29.4     01-15    135  |  97  |  66[H]  ----------------------------<  192[H]  4.8   |  23  |  4.91[H]    Ca    8.3[L]      15 Aquilino 2025 00:22  Phos  4.4     01-15  Mg     2.1     01-15    TPro  5.8[L]  /  Alb  2.9[L]  /  TBili  0.7  /  DBili  x   /  AST  31  /  ALT  23  /  AlkPhos  88  01-15    LIVER FUNCTIONS - ( 15 Aquilino 2025 00:22 )  Alb: 2.9 g/dL / Pro: 5.8 g/dL / ALK PHOS: 88 U/L / ALT: 23 U/L / AST: 31 U/L / GGT: x           PT/INR - ( 15 Aquil[place] 2025 00:22 )   PT: 16.8 sec;   INR: 1.48 ratio         PTT - ( 15 Aquilino 2025 00:22 )  PTT:42.5 sec  ABG - ( 15 Aquilino 2025 00:14 )  pH, Arterial: 7.44  pH, Blood: x     /  pCO2: 36    /  pO2: 90    / HCO3: 24    / Base Excess: 0.5   /  SaO2: 98.9    ------------------------------------------------------------------------------------------------------------------------------  Assessment:  55 yo M s/p CABG x 3 on 12/30/24    Postop acute pulmonary insufficiency  Cardiogenic and Septic shock  Acute blood loss anemia  Ascites    ESRD on iHD   E coli bacteremia 2/2 pneumonia  Leukocytosis       Plan:  ***Neuro***  Postoperative acute pain control with Gabapentin and prns.  Trazadone nightly  PT ongoing.    ***Cardiovascular***  s/p CABG x 3  Remains on low dose Dobutamine  A fib s/p cardioversion - Amiodarone, completing load.  Hydralazine for hypertension.  ASA / statin daily    ***Pulmonary***  Postoperative acute pulmonary insufficiency  Bronchoscopy today.  Aggressive pulmonary clearance, Vol[place], mobilization, wean oxygen as able.    ***GI***  Tube feeds held for bronchoscopy - will resume after.  Protonix for stress ulcer prophylaxis.   Ascites: Last paracentesis 1/11    ***Renal***  ESRD - HD today.  LUE AV fistula     ***ID***  Sepsis/septic shock due to ESBL E. coli - Continue Meropenem. Trend leukocytosis   Diflucan empirically.  Also on Mitch nebs.  PO Vancomycin for C diff prophylaxis   Mccauley stain ordered for diarrhea.    ***Endocrine***  Hyperglycemia - Insulin infusion    ***Hematology***  Acute blood loss anemia  No current transfusion indication.  Heparin infusion for recent a fib.  Epogen.      Care plan discussed with the ICU care team.   Patient remains critical, at risk for life threatening decompensation.    I have spent 65 minutes providing critical care management to this patient.    By signing my name below, I, Sonali Valle, attest that this documentation has been prepared under the direction and in the presence of Jen Sierra MD.  Electronically signed: Sonali Valle, 01-15-25 @ 06:28    I, Jen Sierra, personally performed the services described in this documentation. All medical record entries made by the scribe were at my direction and in my presence. I have reviewed the chart and agree that the record reflects my personal performance and is accurate and complete  Electronically signed: Jen Sierra MD.

## 2025-01-15 NOTE — PROGRESS NOTE ADULT - SUBJECTIVE AND OBJECTIVE BOX
[facility]    Dr. Nica Styles     Office [phone] (9 am to 5 pm)  Service: [phone] (5pm to 9am)  Also Available on TEAMS      RENAL PROGRESS NOTE: DATE OF SERVICE 01-15-25 @ 10:34    Patient is a 54y old  Male who presents with a chief complaint of CAD s/p CABG (14 Jan 2025 07:05)      Patient seen and examined at bedside. No chest pain/sob    VITALS:  T(F): 99.7 (01-15-25 @ 04:00), Max: 99.8 (01-14-25 @ 20:00)  HR: 82 (01-15-25 @ 09:13)  BP: 107/57 (01-15-25 @ 06:00)  RR: 40 (01-15-25 @ 09:13)  SpO2: 100% (01-15-25 @ 09:13)  Wt(kg): --    01-14 @ 07:01  -  01-15 @ 07:00  --------------------------------------------------------  IN: 2097.2 mL / OUT: 0 mL / NET: 2097.2 mL          PHYSICAL EXAM:  Constitutional: NAD  Neck: No JVD  Respiratory: CTAB, no wheezes, rales or rhonchi  Cardiovascular: S1, S2, RRR  Gastrointestinal: BS+, soft, NT/ND  Extremities: No peripheral edema    Hospital Medications:   MEDICATIONS  (STANDING):  albuterol/ipratropium for Nebulization 3 milliLiter(s) Nebulizer every 6 hours  aMIOdarone    Tablet   Oral   aMIOdarone    Tablet 200 milliGRAM(s) Oral daily  aspirin  chewable 81 milliGRAM(s) Oral daily  atorvastatin 80 milliGRAM(s) Oral at bedtime  bisacodyl Suppository 10 milliGRAM(s) Rectal once  chlorhexidine 2% Cloths 1 Application(s) Topical daily  chlorhexidine 4% Liquid 1 Application(s) Topical <User Schedule>  dexMEDEtomidine Infusion 0.5 MICROgram(s)/kG/Hr (10.6 mL/Hr) IV Continuous <Continuous>  dextrose 50% Injectable 50 milliLiter(s) IV Push every 15 minutes  dextrose 50% Injectable 25 milliLiter(s) IV Push every 15 minutes  DOBUTamine Infusion 1 MICROgram(s)/kG/Min (2.55 mL/Hr) IV Continuous <Continuous>  epoetin ignacia (EPOGEN) Injectable 33429 Unit(s) IV Push <User Schedule>  fentaNYL    Injectable 50 MICROGram(s) IV Push once  gabapentin 100 milliGRAM(s) Oral every 12 hours  heparin  Infusion 300 Unit(s)/Hr (13 mL/Hr) IV Continuous <Continuous>  hydrALAZINE 10 milliGRAM(s) Oral every 8 hours  insulin lispro (ADMELOG) corrective regimen sliding scale   SubCutaneous every 6 hours  insulin regular Infusion 2 Unit(s)/Hr (2 mL/Hr) IV Continuous <Continuous>  ketamine Injectable 50 milliGRAM(s) IV Push once  meropenem  IVPB 500 milliGRAM(s) IV Intermittent every 24 hours  Nephro-elicia 1 Tablet(s) Oral daily  pantoprazole  Injectable 40 milliGRAM(s) IV Push daily  sodium chloride 0.65% Nasal 1 Spray(s) Both Nostrils every 12 hours  sodium chloride 0.9%. 1000 milliLiter(s) (10 mL/Hr) IV Continuous <Continuous>  sodium chloride 3%  Inhalation 4 milliLiter(s) Inhalation every 6 hours  tobramycin for Nebulization 300 milliGRAM(s) Inhalation every 12 hours  traZODone 100 milliGRAM(s) Oral at bedtime  vancomycin    Solution 125 milliGRAM(s) Oral every 12 hours      LABS:  01-15    135  |  97  |  66[H]  ----------------------------<  192[H]  4.8   |  23  |  4.91[H]    Ca    8.3[L]      15 Aquilino 2025 00:22  Phos  4.4     01-15  Mg     2.1     01-15    TPro  5.8[L]  /  Alb  2.9[L]  /  TBili  0.7  /  DBili      /  AST  31  /  ALT  23  /  AlkPhos  88  01-15    Creatinine Trend: 4.91 <--, 3.81 <--, 4.59 <--, 3.21 <--, 4.10 <--, 2.98 <--, 1.99 <--    Albumin: 2.9 g/dL (01-15 @ 00:22)  Phosphorus: 4.4 mg/dL (01-15 @ 00:22)                              9.4    17.07 )-----------( 329      ( 15 Aquilino 2025 00:22 )             29.4     Urine Studies:  Urinalysis - [01-15-25 @ 00:22]      Color  / Appearance  / SG  / pH       Gluc 192 / Ketone   / Bili  / Urobili        Blood  / Protein  / Leuk Est  / Nitrite       RBC  / WBC  / Hyaline  / Gran  / Sq Epi  / Non Sq Epi  / Bacteria       Iron 29, TIBC 143, %sat 20      [12-19-24 @ 05:00]  Ferritin 904      [12-19-24 @ 05:00]  TSH 4.75      [12-24-24 @ 06:50]    HBsAg Nonreact      [12-19-24 @ 05:00]      RADIOLOGY & ADDITIONAL STUDIES:

## 2025-01-15 NOTE — PROCEDURE NOTE - NSBRONCHPROCDETAILS_GEN_A_CORE_FT
Procedure Bronchoscopy airway cleanout  Indication:  acute respiratory failure   Complications : None   Post procedure Diagnosis: Extensive mucus plugging with respiratory failure      A Bronchoscope inserted through an ET tube  in the bronchoscopy lab  at [facility]    Trachea was normal   Copious thick mucus plugs were noted in the entire left bronchial tree. Multiple passes were made as therapeutic scope kept getting occluded with mucus plugs. Moderate secretions on the right side.   10% Mucomyst instilled in the airway- 14 ml on the left and 6 ml on the right    No immediate complications were noted.     EBL was minimal < 5 ml    Impression:  Respiratory failure due to extensive mucus plugging from pneumonia. Now on ventilator. Likely needs frequent airway clearance besides non invasive therapies.

## 2025-01-15 NOTE — PROGRESS NOTE ADULT - SUBJECTIVE AND OBJECTIVE BOX
PATIENT SEEN AND EXAMINED BY JEAN PAUL GABRIEL M.D. ON :- 1/15/25  DATE OF SERVICE:    1/15/25         Interim events noted,Labs ,Radiological studies and Cardiology tests reviewed .    Patient is a 54y old  Male who presents with a chief complaint of CAD s/p CABG (14 Jan 2025 07:05)      HPI:  54M with PMH of HTN, HLD, ESRD on HD MWF via LUE AVF, ascites (requiring repeat paracenteses q4-6wks), NICM with moderate LV dysfunction w/ EF 35-40%(2023) and CAD s/p atherectomy + SALLIE to D1(4/11/2024) presents to [facility] transferred from [facility]. Patient initially presented to [facility] ED with shortness of breath. Of note he was recently admitted from 09/27-12/02 for acute cholecystitis s/p cholecystectomy. In [facility] ED, pt was hypoxic to 86% on RA, trop 1.7K, EKG no new changes, CXR fluid overload. Admitted for AHRF 2/2 fluid overload. Pt received HD on 12/18, 12/19, 12/20 and 12/22. DAPT was resumed, TTE showed improvement in LVEF to 40-45%. Stress test was abnormal with 1mm inferior STD, reversible ischemia in the inferior wall and fixed defects in the lateral, anterior and apical walls with post stress EF of 24%. Pt was then transferred to [facility] for cardiac cath which showed pLAD 70% ISR, mLCx 70%, D1 severe dz. Patient now transferred to [facility] CTS Dr. Rivers for CABG eval. Patient denies any current SOB or chest pain on admission. Otherwise denies headaches, abdominal pain, urinary or bowel changes, fevers, chills, N/V/D, sick contacts.  (24 Dec 2024 05:07)      PAST MEDICAL & SURGICAL HISTORY:  Type 2 diabetes mellitus      Hypertension      End stage renal disease  started HD 2/2019 T, Th, Sat via right chest permacath      Bone spur  right shoulder- hx of - sx done      Anemia      Injury of right wrist  hx of at age 14      History of hyperkalemia  before HD- K-6.2 - to repeat in am of sx, pt had HD today      S/P arthroscopy of right shoulder  2010      History of vascular access device  right chest permacath - 2/2019      H/O right wrist surgery  tendons repair - at age 14      AV fistula      H/O ventral hernia repair      S/P cholecystectomy          PREVIOUS DIAGNOSTIC TESTING:      ECHO  FINDINGS:    STRESS  FINDINGS:    CATHETERIZATION  FINDINGS:    MEDICATIONS  (STANDING):  acetylcysteine 20%  Inhalation 4 milliLiter(s) Inhalation every 12 hours  albuterol/ipratropium for Nebulization 3 milliLiter(s) Nebulizer every 6 hours  aMIOdarone    Tablet   Oral   aMIOdarone    Tablet 200 milliGRAM(s) Oral daily  aspirin  chewable 81 milliGRAM(s) Oral daily  atorvastatin 80 milliGRAM(s) Oral at bedtime  bisacodyl Suppository 10 milliGRAM(s) Rectal once  chlorhexidine 0.12% Liquid 15 milliLiter(s) Oral Mucosa every 12 hours  chlorhexidine 2% Cloths 1 Application(s) Topical daily  chlorhexidine 4% Liquid 1 Application(s) Topical <User Schedule>  dexMEDEtomidine Infusion 0.5 MICROgram(s)/kG/Hr (10.6 mL/Hr) IV Continuous <Continuous>  dextrose 50% Injectable 50 milliLiter(s) IV Push every 15 minutes  dextrose 50% Injectable 25 milliLiter(s) IV Push every 15 minutes  DOBUTamine Infusion 2 MICROgram(s)/kG/Min (5.11 mL/Hr) IV Continuous <Continuous>  epoetin ignacia (EPOGEN) Injectable 73055 Unit(s) IV Push <User Schedule>  gabapentin 100 milliGRAM(s) Oral every 12 hours  hydrALAZINE 10 milliGRAM(s) Oral every 8 hours  insulin lispro (ADMELOG) corrective regimen sliding scale   SubCutaneous every 6 hours  insulin regular Infusion 2 Unit(s)/Hr (2 mL/Hr) IV Continuous <Continuous>  meropenem  IVPB 500 milliGRAM(s) IV Intermittent every 24 hours  Nephro-elicia 1 Tablet(s) Oral daily  pantoprazole  Injectable 40 milliGRAM(s) IV Push daily  propofol Injectable 40 milliGRAM(s) IV Push once  sodium chloride 0.65% Nasal 1 Spray(s) Both Nostrils every 12 hours  sodium chloride 0.9%. 1000 milliLiter(s) (10 mL/Hr) IV Continuous <Continuous>  sodium chloride 3%  Inhalation 4 milliLiter(s) Inhalation every 6 hours  tobramycin for Nebulization 300 milliGRAM(s) Inhalation every 12 hours  traZODone 100 milliGRAM(s) Oral at bedtime  vancomycin    Solution 125 milliGRAM(s) Oral every 12 hours    MEDICATIONS  (PRN):  benzocaine 20% Spray 1 Spray(s) Topical every 6 hours PRN throat pain  lidocaine/prilocaine Cream 1 Application(s) Topical daily PRN pre-HD  sodium chloride 0.9% lock flush 10 milliLiter(s) IV Push every 1 hour PRN Pre/post blood products, medications, blood draw, and to maintain line patency      FAMILY HISTORY:  FH: hypertension  mother, father- alive    FH: type 2 diabetes  mother, father- alive        SOCIAL HISTORY:    CIGARETTES:    ALCOHOL:    REVIEW OF SYSTEMS:  CONSTITUTIONAL: No fever, weight loss, or fatigue  EYES: No eye pain, visual disturbances, or discharge  ENMT:  No difficulty hearing, tinnitus, vertigo; No sinus or throat pain  NECK: No pain or stiffness  RESPIRATORY: No cough, wheezing, chills or hemoptysis; No shortness of breath  CARDIOVASCULAR: No chest pain, palpitations, dizziness, or leg swelling  GASTROINTESTINAL: No abdominal or epigastric pain. No nausea, vomiting, or hematemesis; No diarrhea or constipation. No melena or hematochezia.  GENITOURINARY: No dysuria, frequency, hematuria, or incontinence  NEUROLOGICAL: No headaches, memory loss, loss of strength, numbness, or tremors  SKIN: No itching, burning, rashes, or lesions   LYMPH NODES: No enlarged glands  ENDOCRINE: No heat or cold intolerance; No hair loss  MUSCULOSKELETAL: No joint pain or swelling; No muscle, back, or extremity pain  PSYCHIATRIC: No depression, anxiety, mood swings, or difficulty sleeping  HEME/LYMPH: No easy bruising, or bleeding gums  ALLERY AND IMMUNOLOGIC: No hives or eczema    Vital Signs Last 24 Hrs  T(C): 36.6 (15 Aquilino 2025 19:30), Max: 37.6 (15 Aquilino 2025 04:00)  T(F): 97.8 (15 Aquilino 2025 19:30), Max: 99.7 (15 Aquilino 2025 04:00)  HR: 74 (15 Aquilino 2025 21:47) (65 - 93)  BP: 138/63 (15 Aquilino 2025 20:00) (107/51 - 150/60)  BP(mean): 90 (15 Auqilino 2025 20:00) (73 - 98)  RR: 20 (15 Aquilino 2025 20:00) (14 - 40)  SpO2: 100% (15 Aquliino 2025 21:47) (92% - 100%)    Parameters below as of 15 Aquilino 2025 20:00  Patient On (Oxygen Delivery Method): ventilator    O2 Concentration (%): 40      PHYSICAL EXAM:  GENERAL: NAD, well-groomed, well-developed  HEAD:  Atraumatic, Normocephalic  EYES: EOMI, PERRLA, conjunctiva and sclera clear  ENMT: No tonsillar erythema, exudates, or enlargement; Moist mucous membranes, Good dentition, No lesions  NECK: Supple, No JVD, Normal thyroid  NERVOUS SYSTEM:  Alert & Oriented X3, Good concentration; Motor Strength 5/5 B/L upper and lower extremities; DTRs 2+ intact and symmetric  CHEST/LUNG: Clear to percussion bilaterally; No rales, rhonchi, wheezing, or rubs  HEART: Regular rate and rhythm; No murmurs, rubs, or gallops  ABDOMEN: Soft, Nontender, Nondistended; Bowel sounds present  EXTREMITIES:  2+ Peripheral Pulses, No clubbing, cyanosis, or edema  LYMPH: No lymphadenopathy noted  SKIN: No rashes or lesions      INTERPRETATION OF TELEMETRY:    ECG:    CHADSVASC:     LABS:                        9.4    17.07 )-----------( 329      ( 15 Aquilino 2025 00:22 )             29.4     01-15    135  |  97  |  66[H]  ----------------------------<  192[H]  4.8   |  23  |  4.91[H]    Ca    8.3[L]      15 Aquilino 2025 00:22  Phos  4.4     01-15  Mg     2.1     01-15    TPro  5.8[L]  /  Alb  2.9[L]  /  TBili  0.7  /  DBili  x   /  AST  31  /  ALT  23  /  AlkPhos  88  01-15        PT/INR - ( 15 Aquilino 2025 00:22 )   PT: 16.8 sec;   INR: 1.48 ratio         PTT - ( 15 Aquilino 2025 00:22 )  PTT:42.5 sec  Urinalysis Basic - ( 15 Aquilino 2025 00:22 )    Color: x / Appearance: x / SG: x / pH: x  Gluc: 192 mg/dL / Ketone: x  / Bili: x / Urobili: x   Blood: x / Protein: x / Nitrite: x   Leuk Esterase: x / RBC: x / WBC x   Sq Epi: x / Non Sq Epi: x / Bacteria: x      Lipid Panel:   I&O's Summary    14 Jan 2025 07:01  -  15 Aquilino 2025 07:00  --------------------------------------------------------  IN: 2097.2 mL / OUT: 0 mL / NET: 2097.2 mL    15 Aquilino 2025 07:01  -  15 Jan 2025 22:24  --------------------------------------------------------  IN: 1052.7 mL / OUT: 1800 mL / NET: -747.3 mL        RADIOLOGY & ADDITIONAL STUDIES:    < from: TTE W or WO Ultrasound Enhancing Agent (01.15.25 @ 13:06) >    TRANSTHORACIC ECHOCARDIOGRAM REPORT  ________________________________________________________________________________                                      _______       Pt. Name:       ALAINA CANO Study Date:    1/15/2025  MRN:            [id number]       YOB: 1970  Accession #:    [id number]        Age:           54 years  Account#:       [id number]     Gender:        M  Heart Rate:     83 bpm           Height:        68.00 in (172.72 cm)  Rhythm:         sinus rhythmWeight:        160.00 lb (72.58 kg)  Blood Pressure: 115/39 mmHg      BSA/BMI:       1.86 m² / 24.33 kg/m²  ________________________________________________________________________________________  Referring Physician: [id number] Karan Rivers  Primary Sonographer: Paras Manjarrez RDCS    CPT:               ECHO TTE W/O CON F/U LTD - 81919.m  Indication(s):     Cardiogenic shock - R57.0  Procedure:         Limited transthoracic echocardiogram.  Ordering Location: [facility]  Admission Status:  Inpatient  Study Information: Image quality for this study is fair.    _______________________________________________________________________________________     CONCLUSIONS:      1. Left ventricular systolic function is normal with an ejection fraction visually estimated at 55 %.   2. Small pericardial effusion with evidence of hemodynamic compromise (or echocardiographic evidence of cardiac tamponade): respiratory variation across the mitral valve E wave is not greater than 30%.   3. Bilateral pleural effusion noted.    ________________________________________________________________________________________  FINDINGS:     Left Ventricle:  Left ventricular wall thickness is normal. Left ventricular systolic function is normal with an ejection fraction visually estimated at 55%. There are no regional wall motion abnormalities seen.     Aortic Valve:  There is focal calcification of the aortic valve leaflets.     Pericardium:  There is a small pericardial effusion with evidence of hemodynamic compromise (or echocardiographic evidence of cardiac tamponade). This was supported by evidence of, respiratory variation across the mitral valve E wave is not greater than 30%.     Pleura:  Bilateral pleural effusion noted.  ____________________________________________________________________  QUANTITATIVE DATA:  Left Ventricle Measurements: (Indexed to BSA)     IVSd (2D):   1.1 cm  LVPWd (2D):  1.3 cm  LVIDd (2D):  5.0 cm  LVIDs (2D):  2.7 cm  LV Mass:     234 g  125.7 g/m²  Visualized LV EF%: 55%                LVOT / RVOT/ Qp/Qs Data: (Indexed to BSA)  LVOT Diameter: 1.90 cm  LVOT Area:     2.84 cm²    ________________________________________________________________________________________  Electronically signed on 1/15/2025 at 1:52:42 PM by Shelia NICHOLS         *** Final ***    < end of copied text >

## 2025-01-15 NOTE — PROVIDER CONTACT NOTE (CHANGE IN STATUS NOTIFICATION) - ASSESSMENT
Patients O2 sat dropped and stayed consistent 60% escalating healthcare team to intubate to protect airway. See cardiac arrest sheet for more information.

## 2025-01-15 NOTE — AIRWAY PLACEMENT NOTE ADULT - AIRWAY COMMENTS:
intubated by anesthesia
pt got intubated in ctu by dr. mckeon
Assisted Dr. Hodges with intubation at bedside. ETT 7.5, 23cm @ lip. placed patient on , 22, +10, 100%. plan is to bronch patient

## 2025-01-15 NOTE — PROGRESS NOTE ADULT - SUBJECTIVE AND OBJECTIVE BOX
Patient is a 54y old  Male who presents with a chief complaint of CAD s/p CABG (14 Jan 2025 07:05)    Being followed by ID for        Interval history:  pt still with productive cough  soft stool  standing in stander   awake and alert   No other acute events        PAST MEDICAL & SURGICAL HISTORY:  Type 2 diabetes mellitus      Hypertension      End stage renal disease  started HD 2/2019 T, Th, Sat via right chest permacath      Bone spur  right shoulder- hx of - sx done      Anemia      Injury of right wrist  hx of at age 14      History of hyperkalemia  before HD- K-6.2 - to repeat in am of sx, pt had HD today      S/P arthroscopy of right shoulder  2010      History of vascular access device  right chest permacath - 2/2019      H/O right wrist surgery  tendons repair - at age 14      AV fistula      H/O ventral hernia repair      S/P cholecystectomy        Allergies    No Known Allergies    Intolerances      Antimicrobials:    meropenem  IVPB 500 milliGRAM(s) IV Intermittent every 24 hours  tobramycin for Nebulization 300 milliGRAM(s) Inhalation every 12 hours  vancomycin    Solution 125 milliGRAM(s) Oral every 12 hours    MEDICATIONS  (STANDING):  albuterol/ipratropium for Nebulization 3 milliLiter(s) Nebulizer every 6 hours  aMIOdarone    Tablet   Oral   aMIOdarone    Tablet 200 milliGRAM(s) Oral daily  aspirin  chewable 81 milliGRAM(s) Oral daily  atorvastatin 80 milliGRAM(s) Oral at bedtime  bisacodyl Suppository 10 milliGRAM(s) Rectal once  chlorhexidine 2% Cloths 1 Application(s) Topical daily  chlorhexidine 4% Liquid 1 Application(s) Topical <User Schedule>  dexMEDEtomidine Infusion 0.5 MICROgram(s)/kG/Hr (10.6 mL/Hr) IV Continuous <Continuous>  dextrose 50% Injectable 50 milliLiter(s) IV Push every 15 minutes  dextrose 50% Injectable 25 milliLiter(s) IV Push every 15 minutes  DOBUTamine Infusion 1 MICROgram(s)/kG/Min (2.55 mL/Hr) IV Continuous <Continuous>  epoetin ignacia (EPOGEN) Injectable 15634 Unit(s) IV Push <User Schedule>  fentaNYL    Injectable 50 MICROGram(s) IV Push once  gabapentin 100 milliGRAM(s) Oral every 12 hours  heparin  Infusion 300 Unit(s)/Hr (13 mL/Hr) IV Continuous <Continuous>  hydrALAZINE 10 milliGRAM(s) Oral every 8 hours  insulin lispro (ADMELOG) corrective regimen sliding scale   SubCutaneous every 6 hours  insulin regular Infusion 2 Unit(s)/Hr (2 mL/Hr) IV Continuous <Continuous>  ketamine Injectable 50 milliGRAM(s) IV Push once  meropenem  IVPB 500 milliGRAM(s) IV Intermittent every 24 hours  Nephro-elicia 1 Tablet(s) Oral daily  pantoprazole  Injectable 40 milliGRAM(s) IV Push daily  sodium chloride 0.65% Nasal 1 Spray(s) Both Nostrils every 12 hours  sodium chloride 0.9%. 1000 milliLiter(s) (10 mL/Hr) IV Continuous <Continuous>  sodium chloride 3%  Inhalation 4 milliLiter(s) Inhalation every 6 hours  tobramycin for Nebulization 300 milliGRAM(s) Inhalation every 12 hours  traZODone 100 milliGRAM(s) Oral at bedtime  vancomycin    Solution 125 milliGRAM(s) Oral every 12 hours      Vital Signs Last 24 Hrs  T(C): 37.6 (01-15-25 @ 04:00), Max: 37.7 (01-14-25 @ 20:00)  T(F): 99.7 (01-15-25 @ 04:00), Max: 99.8 (01-14-25 @ 20:00)  HR: 82 (01-15-25 @ 09:13) (81 - 94)  BP: 107/57 (01-15-25 @ 06:00) (107/57 - 155/76)  BP(mean): 78 (01-15-25 @ 06:00) (76 - 108)  RR: 40 (01-15-25 @ 09:13) (12 - 40)  SpO2: 100% (01-15-25 @ 09:13) (92% - 100%)    Physical Exam:    Constitutional standing in stand    HEENT PERRLA EOMI,No pallor or icterus    No oral exudate or erythema    Neck supple no JVD or LN    Chest Good AE,CTA    CVS  S1 S2     Abd soft BS normal No tenderness     Ext No cyanosis clubbing or edema    IV site no erythema tenderness or discharge    Joints no swelling or LOM    CNS AAO X 3 no focal    Lab Data:                          9.4    17.07 )-----------( 329      ( 15 Aquilino 2025 00:22 )             29.4       01-15    135  |  97  |  66[H]  ----------------------------<  192[H]  4.8   |  23  |  4.91[H]    Ca    8.3[L]      15 Aquilino 2025 00:22  Phos  4.4     01-15  Mg     2.1     01-15    TPro  5.8[L]  /  Alb  2.9[L]  /  TBili  0.7  /  DBili  x   /  AST  31  /  ALT  23  /  AlkPhos  88  01-15          Bronchial  01-11-25 --  --  --      Bronchial  01-10-25   Commensal manjit consistent with body site  --    No polymorphonuclear leukocytes seen per low power field  No squamous epithelial cells seen per low power field  No organisms seen per oil power field      .Blood BLOOD  01-09-25   No growth at 5 days  --  --          WBC Count: 17.07 (01-15-25 @ 00:22)  WBC Count: 21.76 (01-14-25 @ 00:26)  WBC Count: 21.73 (01-13-25 @ 00:26)  WBC Count: 21.67 (01-12-25 @ 00:30)  WBC Count: 29.61 (01-11-25 @ 00:32)  WBC Count: 33.53 (01-10-25 @ 00:59)  WBC Count: 27.85 (01-09-25 @ 00:31)       Bilirubin Total: 0.7 mg/dL (01-15-25 @ 00:22)  Aspartate Aminotransferase (AST/SGOT): 31 U/L (01-15-25 @ 00:22)  Alanine Aminotransferase (ALT/SGPT): 23 U/L (01-15-25 @ 00:22)  Alkaline Phosphatase: 88 U/L (01-15-25 @ 00:22)    Bilirubin Total: 0.7 mg/dL (01-14-25 @ 00:26)  Aspartate Aminotransferase (AST/SGOT): 21 U/L (01-14-25 @ 00:26)  Alanine Aminotransferase (ALT/SGPT): 26 U/L (01-14-25 @ 00:26)  Alkaline Phosphatase: 85 U/L (01-14-25 @ 00:26)      Culture - Bronchial (01.03.25 @ 05:51)    -  Trimethoprim/Sulfamethoxazole: R >2/38   Gram Stain:   Numerous polymorphonuclear leukocytes seen per low power field  Numerous Gram Negative Rods seen per oil power field   -  Ampicillin: R >16 These ampicillin results predict results for amoxicillin   -  Ampicillin/Sulbactam: R 8/4   -  Aztreonam: R >16   -  Cefazolin: R >16   -  Cefepime: R >16   -  Ceftriaxone: R >32   -  Ciprofloxacin: R >2   -  Ertapenem: S <=0.5   -  Gentamicin: S <=2   -  Imipenem: S <=1   -  Levofloxacin: R >4   -  Meropenem: S <=1   -  Piperacillin/Tazobactam: R <=8   -  Tobramycin: S <=2   Specimen Source: Bronchial   Culture Results:   Numerous Escherichia coli ESBL  Commensal manjti consistent with body site   Organism Identification: Escherichia coli ESBL   Organism: Escherichia coli ESBL   Method Type: LISA

## 2025-01-15 NOTE — PROGRESS NOTE ADULT - SUBJECTIVE AND OBJECTIVE BOX
Pulmonary Consult Follow up     Interval Events:    Worsening oxygenation and breathing overnight. CXR this morning with opacification of the left hemithorax.      REVIEW OF SYSTEMS:  [x] All other systems negative except per HPI   [ ] Unable to assess ROS because ________    OBJECTIVE:  ICU Vital Signs Last 24 Hrs  T(C): 36.5 (15 Aquilino 2025 16:03), Max: 37.7 (14 Jan 2025 20:00)  T(F): 97.7 (15 Aquilino 2025 16:03), Max: 99.8 (14 Jan 2025 20:00)  HR: 70 (15 Aquilino 2025 17:00) (65 - 94)  BP: 107/51 (15 Aquilino 2025 13:00) (107/51 - 155/76)  BP(mean): 73 (15 Aquilino 2025 13:00) (73 - 106)  ABP: 164/43 (15 Aquilino 2025 17:00) (101/37 - 201/53)  ABP(mean): 64 (15 Aquilino 2025 17:00) (49 - 96)  RR: 20 (15 Aquilino 2025 17:00) (14 - 40)  SpO2: 100% (15 Aquilino 2025 17:00) (92% - 100%)    O2 Parameters below as of 15 Aquilino 2025 16:03  Patient On (Oxygen Delivery Method): ventilator          Mode: AC/ CMV (Assist Control/ Continuous Mandatory Ventilation), RR (machine): 20, FiO2: 40, PEEP: 10, ITime: 1, MAP: 15, PC: 15, PIP: 26    01-14 @ 07:01  -  01-15 @ 07:00  --------------------------------------------------------  IN: 2097.2 mL / OUT: 0 mL / NET: 2097.2 mL    01-15 @ 07:01  -  01-15 @ 17:22  --------------------------------------------------------  IN: 204.8 mL / OUT: 0 mL / NET: 204.8 mL        PHYSICAL EXAM:  GENERAL: NAD   HEAD:  Atraumatic, Normocephalic  CHEST/LUNG: increased oxygen requirements   HEART: Regular rate and rhythm   ABDOMEN: Nondistended   SKIN: No rashes or lesions  NERVOUS SYSTEM:  Alert & Oriented X3, Good concentration    HOSPITAL MEDICATIONS:  MEDICATIONS  (STANDING):  acetylcysteine 20%  Inhalation 4 milliLiter(s) Inhalation every 12 hours  albuterol/ipratropium for Nebulization 3 milliLiter(s) Nebulizer every 6 hours  aMIOdarone    Tablet   Oral   aMIOdarone    Tablet 200 milliGRAM(s) Oral daily  aspirin  chewable 81 milliGRAM(s) Oral daily  atorvastatin 80 milliGRAM(s) Oral at bedtime  bisacodyl Suppository 10 milliGRAM(s) Rectal once  chlorhexidine 0.12% Liquid 15 milliLiter(s) Oral Mucosa every 12 hours  chlorhexidine 2% Cloths 1 Application(s) Topical daily  chlorhexidine 4% Liquid 1 Application(s) Topical <User Schedule>  dexMEDEtomidine Infusion 0.5 MICROgram(s)/kG/Hr (10.6 mL/Hr) IV Continuous <Continuous>  dextrose 50% Injectable 50 milliLiter(s) IV Push every 15 minutes  dextrose 50% Injectable 25 milliLiter(s) IV Push every 15 minutes  DOBUTamine Infusion 1 MICROgram(s)/kG/Min (2.55 mL/Hr) IV Continuous <Continuous>  epoetin ignacia (EPOGEN) Injectable 94123 Unit(s) IV Push <User Schedule>  gabapentin 100 milliGRAM(s) Oral every 12 hours  hydrALAZINE 10 milliGRAM(s) Oral every 8 hours  insulin lispro (ADMELOG) corrective regimen sliding scale   SubCutaneous every 6 hours  insulin regular Infusion 2 Unit(s)/Hr (2 mL/Hr) IV Continuous <Continuous>  meropenem  IVPB 500 milliGRAM(s) IV Intermittent every 24 hours  Nephro-elicia 1 Tablet(s) Oral daily  pantoprazole  Injectable 40 milliGRAM(s) IV Push daily  propofol Injectable 40 milliGRAM(s) IV Push once  sodium chloride 0.65% Nasal 1 Spray(s) Both Nostrils every 12 hours  sodium chloride 0.9%. 1000 milliLiter(s) (10 mL/Hr) IV Continuous <Continuous>  sodium chloride 3%  Inhalation 4 milliLiter(s) Inhalation every 6 hours  tobramycin for Nebulization 300 milliGRAM(s) Inhalation every 12 hours  traZODone 100 milliGRAM(s) Oral at bedtime  vancomycin    Solution 125 milliGRAM(s) Oral every 12 hours    MEDICATIONS  (PRN):  benzocaine 20% Spray 1 Spray(s) Topical every 6 hours PRN throat pain  lidocaine/prilocaine Cream 1 Application(s) Topical daily PRN pre-HD  sodium chloride 0.9% lock flush 10 milliLiter(s) IV Push every 1 hour PRN Pre/post blood products, medications, blood draw, and to maintain line patency      LABS:  01-15    135  |  97  |  66[H]  ----------------------------<  192[H]  4.8   |  23  |  4.91[H]  01-14    136  |  97  |  46[H]  ----------------------------<  159[H]  4.0   |  24  |  3.81[H]  01-13    136  |  97  |  52[H]  ----------------------------<  238[H]  4.6   |  22  |  4.59[H]    Ca    8.3[L]      15 Aquilino 2025 00:22  Ca    8.8      14 Jan 2025 00:26  Ca    8.6      13 Jan 2025 00:26  Phos  4.4     01-15  Mg     2.1     01-15    TPro  5.8[L]  /  Alb  2.9[L]  /  TBili  0.7  /  DBili  x   /  AST  31  /  ALT  23  /  AlkPhos  88  01-15  TPro  6.1  /  Alb  3.1[L]  /  TBili  0.7  /  DBili  x   /  AST  21  /  ALT  26  /  AlkPhos  85  01-14  TPro  6.3  /  Alb  3.2[L]  /  TBili  1.0  /  DBili  x   /  AST  23  /  ALT  36  /  AlkPhos  85  01-13    Magnesium: 2.1 mg/dL (01-15-25 @ 00:22)  Magnesium: 2.2 mg/dL (01-14-25 @ 00:26)  Magnesium: 2.4 mg/dL (01-13-25 @ 00:26)    Phosphorus: 4.4 mg/dL (01-15-25 @ 00:22)  Phosphorus: 4.0 mg/dL (01-14-25 @ 00:26)  Phosphorus: 5.0 mg/dL (01-13-25 @ 00:26)      PT/INR - ( 15 Aquilino 2025 00:22 )   PT: 16.8 sec;   INR: 1.48 ratio         PTT - ( 15 Aquilino 2025 00:22 )  PTT:42.5 sec              Urinalysis Basic - ( 15 Aquilino 2025 00:22 )    Color: x / Appearance: x / SG: x / pH: x  Gluc: 192 mg/dL / Ketone: x  / Bili: x / Urobili: x   Blood: x / Protein: x / Nitrite: x   Leuk Esterase: x / RBC: x / WBC x   Sq Epi: x / Non Sq Epi: x / Bacteria: x      ABG - ( 15 Aquilino 2025 15:00 )  pH, Arterial: 7.46  pH, Blood: x     /  pCO2: 33    /  pO2: 97    / HCO3: 24    / Base Excess: -0.1  /  SaO2: 99.8                                    9.4    17.07 )-----------( 329      ( 15 Aquilino 2025 00:22 )             29.4                         9.5    21.76 )-----------( 292      ( 14 Jan 2025 00:26 )             29.4                         9.3    21.73 )-----------( 226      ( 13 Jan 2025 00:26 )             28.4     CAPILLARY BLOOD GLUCOSE  87 (15 Aquilino 2025 07:00)  114 (15 Aquilino 2025 06:00)  120 (15 Aquilino 2025 05:00)  121 (15 Aquilino 2025 04:00)  117 (15 Aquilino 2025 03:00)  130 (15 Aquilino 2025 02:00)  158 (15 Aquilino 2025 01:00)  190 (15 Aquilino 2025 00:00)  147 (14 Jan 2025 23:00)  136 (14 Jan 2025 22:00)  96 (14 Jan 2025 21:00)  156 (14 Jan 2025 20:00)  98 (14 Jan 2025 19:00)      POCT Blood Glucose.: 91 mg/dL (15 Aquilino 2025 17:10)  POCT Blood Glucose.: 93 mg/dL (15 Aquilino 2025 16:16)  POCT Blood Glucose.: 174 mg/dL (15 Aquilino 2025 14:55)  POCT Blood Glucose.: 143 mg/dL (15 Aquilino 2025 11:28)  POCT Blood Glucose.: 113 mg/dL (15 Aquilino 2025 07:38)  POCT Blood Glucose.: 87 mg/dL (15 Aquilino 2025 06:48)  POCT Blood Glucose.: 114 mg/dL (15 Aquilino 2025 06:04)  POCT Blood Glucose.: 120 mg/dL (15 Aquilino 2025 05:03)  POCT Blood Glucose.: 121 mg/dL (15 Aquilino 2025 04:04)  POCT Blood Glucose.: 117 mg/dL (15 Aquilino 2025 03:07)  POCT Blood Glucose.: 130 mg/dL (15 Aquilino 2025 01:56)  POCT Blood Glucose.: 158 mg/dL (15 Aquilino 2025 00:59)  POCT Blood Glucose.: 190 mg/dL (15 Aquilino 2025 00:00)  POCT Blood Glucose.: 147 mg/dL (14 Jan 2025 22:50)  POCT Blood Glucose.: 137 mg/dL (14 Jan 2025 22:11)  POCT Blood Glucose.: 95 mg/dL (14 Jan 2025 20:44)  POCT Blood Glucose.: 156 mg/dL (14 Jan 2025 20:07)  POCT Blood Glucose.: 98 mg/dL (14 Jan 2025 18:55)  POCT Blood Glucose.: 120 mg/dL (14 Jan 2025 17:36)    Blood Gas Source Venous: Venous (01-15-25 @ 15:00)  Blood Gas Source Venous: Venous (01-15-25 @ 00:14)  Blood Gas Source Venous: Venous (01-14-25 @ 00:03)

## 2025-01-15 NOTE — PROGRESS NOTE ADULT - ATTENDING COMMENTS
Seen and examined patient with NP/PA/ Fellow physician.  Independently verified the review of systems, physical examination, labs, radiological imaging. Also discussed with consultants to formulate eventual assessment and plan.      Assessment and plan:   Respiratory arrest secondary to poor ventilation for mucous plugging.  Underwent bronchoscopy today with copious secretions on the left side and mild to moderate on the right side.  Instilled 20 mL of 10% Mucomyst in the airway after cleanout.  Plan to continue to use nebulization with 20% Mucomyst solution every 6 hours for now till airway clearance achieved  Plan for bronchoscopy again tomorrow    Carlos Trivedi MD, MPH   Lung Transplantation  , Pulmonary and Critical care Medicine  [facility]

## 2025-01-15 NOTE — PROGRESS NOTE ADULT - SUBJECTIVE AND OBJECTIVE BOX
Patient seen and examined at the bedside.    Remained critically ill on continuous ICU monitoring.    OBJECTIVE:  Vital Signs Last 24 Hrs  T(C): 36.6 (15 Aquilino 2025 19:30), Max: 37.6 (15 Aquilino 2025 04:00)  T(F): 97.8 (15 Aquilino 2025 19:30), Max: 99.7 (15 Aquilino 2025 04:00)  HR: 74 (15 Aquilino 2025 20:00) (65 - 93)  BP: 138/63 (15 Aquilino 2025 20:00) (107/51 - 150/60)  BP(mean): 90 (15 Aquilino 2025 20:00) (73 - 106)  RR: 20 (15 Aquilino 2025 20:00) (14 - 40)  SpO2: 100% (15 Aquilino 2025 20:00) (92% - 100%)    Parameters below as of 15 Aquilino 2025 20:00  Patient On (Oxygen Delivery Method): ventilator    O2 Concentration (%): 40      Physical Exam:   General: Intubated   Neurology: Sedated  Eyes: bilateral pupils equal and reactive   ENT/Neck: +ETT, Neck supple, trachea midline, No JVD   Respiratory: Clear bilaterally   CV: S1S2, no murmurs        [x] Sternal dressing         [x] Sinus rhythm   Abdominal: Soft, NT, ND +BS   Extremities: 1-2+ pedal edema noted, + peripheral pulses   Skin: No Rashes, Hematoma, Ecchymosis                           Assessment:  53 yo M s/p CABG x 3 on 12/30/24    Postop acute pulmonary insufficiency  Cardiogenic and Septic shock  Acute blood loss anemia  Ascites    ESRD on iHD   E coli bacteremia 2/2 pneumonia  Leukocytosis  Plan:   ***Neuro***  [x] Sedated with [x] Diprivan [x] Precedex   Post operative neuro assessment   Gabapentin for pain management  Nightly Trazodone    ***Cardiovascular***  Invasive hemodynamic monitoring, assess perfusion indices   SR / CVP 9 / MAP 71 / Hct 29.4% / Lactate 1.1  [x] Dobutamine - 2 mcg/kg/min  Continuos reassessment of hemodynamics  Amiodarone for Afib prophylaxis   Hydralazine for afterload reduction  [x]  ASA [x] Statin   Serial EKG and cardiac enzymes     ***Pulmonary***  Post op vent management   Titration of FiO2 and PEEP, follow SpO2, CXR, blood gasses   Continue bronchodilators as tolerated.  Trach to be performed tomorrow (1/16)    Mode: AC/ CMV (Assist Control/ Continuous Mandatory Ventilation)  RR (machine): 20  FiO2: 40  PEEP: 10  ITime: 1  MAP: 15  PC: 15  PIP: 26              ***GI***  [x] NPO at MN  [x] Protonix    ***Renal***  [x] ESRD on HD   Continue to monitor I/Os, BUN/Creatinine.   Replete lytes PRN  Nephro-Lisette for renal support    ***ID***  Meropenem for Bacteremia  Mitch for Respiratory infection  PO Vanco for C. Diff prophylaxis     ***Endocrine***  [x]  DM2 : HbA1c 5.6%                - [x] Insulin gtt  [x]  ISS              - Need tight glycemic control to prevent wound infection.            Patient requires continuous monitoring with bedside rhythm monitoring, pulse oximetry monitoring, and continuous central venous and arterial pressure monitoring; and intermittent blood gas analysis. Care plan discussed with the ICU care team.   Patient remained critical, at risk for life threatening decompensation.    I have spent 50 minutes providing critical care management to this patient.    By signing my name below, I, Mel Castro, attest that this documentation has been prepared under the direction and in the presence of Judith Marie NP  Electronically signed: Evelio Solitario, 01-15-25 @ 20:54    I, Judith Marie NP, personally performed the services described in this documentation. all medical record entries made by the shubhamibdarlene were at my direction and in my presence. I have reviewed the chart and agree that the record reflects my personal performance and is accurate and complete  Electronically signed: Judith Marie NP Patient seen and examined at the bedside.    Remained critically ill on continuous ICU monitoring.    OBJECTIVE:  Vital Signs Last 24 Hrs  T(C): 36.6 (15 Aquilino 2025 19:30), Max: 37.6 (15 Aquilino 2025 04:00)  T(F): 97.8 (15 Aquilino 2025 19:30), Max: 99.7 (15 Aquilino 2025 04:00)  HR: 74 (15 Aquilino 2025 20:00) (65 - 93)  BP: 138/63 (15 Aquilino 2025 20:00) (107/51 - 150/60)  BP(mean): 90 (15 Aquilino 2025 20:00) (73 - 106)  RR: 20 (15 Aquilino 2025 20:00) (14 - 40)  SpO2: 100% (15 Aquilino 2025 20:00) (92% - 100%)    Parameters below as of 15 Aquilino 2025 20:00  Patient On (Oxygen Delivery Method): ventilator    O2 Concentration (%): 40      Physical Exam:   General: Intubated   Neurology: Sedated  Eyes: bilateral pupils equal and reactive   ENT/Neck: +ETT, Neck supple, trachea midline, No JVD   Respiratory: Clear bilaterally   CV: S1S2, no murmurs        [x] Sternal dressing         [x] Sinus rhythm   Abdominal: Soft, NT, ND +BS   Extremities: 1-2+ pedal edema noted, + peripheral pulses   Skin: No Rashes, Hematoma, Ecchymosis                           Assessment:  55 yo M s/p CABG x 3 on 12/30/24    Postop acute pulmonary insufficiency  Cardiogenic and Septic shock  Acute blood loss anemia  Ascites    ESRD on iHD   E coli bacteremia 2/2 pneumonia  Leukocytosis  Plan:   ***Neuro***  [x] Sedated with [x] Diprivan [x] Precedex   Post operative neuro assessment   Gabapentin for pain management  Nightly Trazodone    ***Cardiovascular***  Invasive hemodynamic monitoring, assess perfusion indices   SR / CVP 9 / MAP 71 / Hct 29.4% / Lactate 1.1  [x] Dobutamine - 2 mcg/kg/min  Continuos reassessment of hemodynamics  Amiodarone for Afib prophylaxis   Hydralazine for afterload reduction  [x]  ASA [x] Statin   Serial EKG and cardiac enzymes     ***Pulmonary***  Post op vent management   Titration of FiO2 and PEEP, follow SpO2, CXR, blood gasses   Continue bronchodilators as tolerated.  Trach to be performed tomorrow (1/16)    Mode: AC/ CMV (Assist Control/ Continuous Mandatory Ventilation)  RR (machine): 20  FiO2: 40  PEEP: 10  ITime: 1  MAP: 15  PC: 15  PIP: 26              ***GI***  [x] NPO at MN  [x] Protonix    ***Renal***  [x] ESRD on HD   Continue to monitor I/Os, BUN/Creatinine.   Replete lytes PRN  Nephro-Lisette for renal support    ***ID***  Meropenem for Bacteremia  Mitch for Respiratory infection  PO Vanco for C. Diff prophylaxis     ***Endocrine***  [x]  DM2 : HbA1c 5.6%                - [x] Insulin gtt  [x]  ISS              - Need tight glycemic control to prevent wound infection.            Patient requires continuous monitoring with bedside rhythm monitoring, pulse oximetry monitoring, and continuous central venous and arterial pressure monitoring; and intermittent blood gas analysis. Care plan discussed with the ICU care team.   Patient remained critical, at risk for life threatening decompensation.    I have spent 40 minutes providing critical care management to this patient.    By signing my name below, I, Mel Castro, attest that this documentation has been prepared under the direction and in the presence of Judith Marie NP  Electronically signed: Evelio Solitario, 01-15-25 @ 20:54    I, Judith Marie NP, personally performed the services described in this documentation. all medical record entries made by the shubhamibdarlene were at my direction and in my presence. I have reviewed the chart and agree that the record reflects my personal performance and is accurate and complete  Electronically signed: Judith Marie NP

## 2025-01-15 NOTE — PROGRESS NOTE ADULT - ASSESSMENT
acute respiratory failure  -extubated on 1/8 after refusing tracheostomy, but having recurrent left lung collapse requiring bronchoscopy and reintubation today  -plan percutaneous tracheostomy at bedside tomorrow

## 2025-01-15 NOTE — PROGRESS NOTE ADULT - ASSESSMENT
54M with PMH of HTN, HLD, ESRD on HD MWF via LUE AVF, ascites (requiring repeat paracenteses q4-6wks), NICM with moderate LV dysfunction w/ EF 35-40%() and CAD s/p atherectomy + SALLIE to D1(2024) presents to [facility] transferred from [facility]. Patient initially presented to [facility] ED with shortness of breath. Of note he was recently admitted from - for acute cholecystitis s/p cholecystectomy. In [facility] ED, pt was hypoxic to 86% on RA, trop 1.7K, EKG no new changes, CXR fluid overload. Admitted for AHRF 2/2 fluid overload. Pt received HD on , ,  and . DAPT was resumed, TTE showed improvement in LVEF to 40-45%. Stress test was abnormal with 1mm inferior STD, reversible ischemia in the inferior wall and fixed defects in the lateral, anterior and apical walls with post stress EF of 24%. Pt was then transferred to [facility] for cardiac cath which showed pLAD 70% ISR, mLCx 70%, D1 severe dz. Patient now transferred to [facility] CTS Dr. Rivers for CABG eval. Patient denies any current SOB or chest pain on admission. Otherwise denies headaches, abdominal pain, urinary or bowel changes, fevers, chills, N/V/D, sick contacts.  (24 Dec 2024 05:07)   VSS  CP free   HD via avf  for daily HD pre op- on lovenox 30 qd    HD today continue with present meds. Plan for CABG possibleTVR next week   vss; rsr 55-80; hd today w/ 1 unit prbc; pt to be dialzyed also this  w/ another 1 unit prbc   VSS; RSR 60-80; continue asa/bb/ statin; HD in am - transfuse 1 unit prbc with HD; d/c lovenox after am dose sun    - Pt underwent  C3L free LIMA-LAD; SVG-Diag; SVG-leftPL  Pt given cefuroxime perioperatively   pt extubated   . pt with hypotension and ECHO showing Systolic HF/depressed BIV Function, currently supported with /pressors.  Labs this evening showing transaminitis  1/2 -3 pt hypotensive, febrile and reintubated  Pt pancultured. and started on zosyn- meropenem and gent  pt found to have Gram negative bacteremia    E coli +ESBL bacteremia        A/P  #sepsis  ? source of bacteremia  - many options. Pt with abnormal u/a but is a dialysis pt so hard to interpret. Pt with h//o SBP- pt with ascites   s/p  paracentesis 1/3  cultures No Growth to date  - no organisms notes on gram stain  Likely from lungs - CT with consolidation Left lung, especially LLL  Continue meropenem- day # 12 and inhaled tobramycin  lines  were  changed, s/p d/c shiley   Pt with worsening WBC. ? from plug  Ct with left sided consolidation- check cultures. also with large ascites. consdier repeat paracentesis   check c diff if  continues to have diarrhea  check bladder scan- to make sure no urinary source- not distended on CT   U/S of graft  with stenosis but normal function.  IF NO ANSWER AFTER ABOVE THEN CONSIDER wbc TAGGED STUDY   unclear why WBC has not improved further but certainly going in the right direction and pt seems stronger daily        #elevated LFTs  likely shock liver  acute  hepatitis serology- negative   trend  avoid hepatotoxic meds    improving daily         #thrombocytopenia   HIt ab negative  Likely from sepsis vs from IABP or drugs   trend   resolved !    #Renal failure  going back to Hemodialysis   changed the meropenem to daily dosing - FOR RENAL FUNCTION    #hoarse voice  suggest ENT input       Marla Champion M.D. ,   please reach via teams   If no answer, or after 5PM/ weekends,  then please call  [phone]    Assessment and plan discussed with the primary team .

## 2025-01-15 NOTE — PROGRESS NOTE ADULT - ASSESSMENT
54M with PMH of HTN, HLD, ESRD on HD MWF via LUE AVF, ascites (requiring repeat paracenteses q4-6wks), NICM with moderate LV dysfunction w/ EF 35-40%() and CAD s/p atherectomy + SALLIE to D1(2024) presents to [facility] transferred from [facility]. Patient initially presented to [facility] ED with shortness of breath. Of note he was recently admitted from - for acute cholecystitis s/p cholecystectomy. In [facility] ED, pt was hypoxic to 86% on RA, trop 1.7K, EKG no new changes, CXR fluid overload. Admitted for AHRF 2/2 fluid overload. Pt received HD on , ,  and . DAPT was resumed, TTE showed improvement in LVEF to 40-45%. Stress test was abnormal with 1mm inferior STD, reversible ischemia in the inferior wall and fixed defects in the lateral, anterior and apical walls with post stress EF of 24%.     Pt was then transferred to [facility] for cardiac cath which showed pLAD 70% ISR, mLCx 70%, D1 severe dz.     Patient now transferred to [facility] CTS Dr. Rivers for CABG eval.# CAD s/p NSTEMI  Hx CAD s/p PCI LAD   Presented with ADHF elevated troponins  Positive NST1-Cath- pLAD 70% ISR, mLCx 70%, D1 severe dz.   TTE EF 35% Severe TRWas on Plavix-[id number] 321 been off Plavix since -POD#1-C3L free LIMA-LAD; SVG-Diag; WTJ-[id number]-Lijbhcrwp on  Sinus rhythm,Amio for AF prophylaxis  -OOB off  Sinus rhythm   -POD#3-On /pressors-EF 25-30% RV failure with transaminitis-Sinus Toapse23/03-POD#4-Was intubated for hypoxia-gradual hypotension on /pressors had IABP inserted this morning  -POD#5-s/p IABP insertion, sepsis/septic shock due to GNR/ECOLI HD cath placed   Bronched yesterday 1/3 - minimal secretions with rust-tinged sputum     ESBL-E Coli bacteremia on meropenam    - POD#7 Atrial Fib ,remains intubated weaning IABP 1:3,off pressors on CRRT  -IABP removed.Remains on vent-Alex 10ppm,off Levo- CVP 10 / MAP 71 / Hct 25.6% / Lactate 1.7-Atrial Fibrillation  -Intubated on Alex 10ppm, gtt, BP better-AF~~90's on Amio-had bronch still febrile Tmax 101  -Extubated awake alert on HFNC AF,on Dobutrex-CRRT,Cultures no growth    01/10-OOB on BiPAP Sinus Rhythm on -SR/ MAP 57/ CVP 10/  Hct 26.7 / Lact 1.4  -s/p EDU/DCCV-Sinus rhythm on -SR / MAP 52 / CVP 12/ Hct 25.8 / Lact 0.7-had bronch with BAL Left lung  -Sinus rhythm on -for repeat Bronch-SR / MAP 67 / CVP 11 / Hct 27.4% / Lact 0.8    # Acute on Chronic Systolic Heart Failure  EF-35% ,severe TR  Had HD post op  GDMT post op  LVEF improved post bypass but now at 23-30%-BiV dysfunction on  now off pressors  -TTE EF 40%,trace effusion  ECG c/w pericarditis-on colchicine     Vascular evaluated  AVGraft /?steal causing RV failure-'' Very low suspicion of steal Sd and AVF causing current clinical state. ''   VA Duplex Hemodialysis Access, Left (25 @ 13:35) >   Arterial flow volume of 1301 mL/m, and the venous flow volume of  1492 mL/m are consistent with a normally functioning dialysis access  fistula.        # Ascites  Hx chronic ascites  Has drainage q4-6 weeks  Last drained -4600mL  ? ascites related to severe TR  Had pltwloxgrpxx-Lnyvnnx-bc growth   CT Abdomen 01/10-Large volume ascites-consider paracentesis  Monitor      # ESRD  Now off CRRT transition to HD

## 2025-01-16 ENCOUNTER — TRANSCRIPTION ENCOUNTER (OUTPATIENT)
Age: 55
End: 2025-01-16

## 2025-01-16 LAB
ALBUMIN SERPL ELPH-MCNC: 2.5 G/DL — LOW (ref 3.3–5)
ALP SERPL-CCNC: 75 U/L — SIGNIFICANT CHANGE UP (ref 40–120)
ALT FLD-CCNC: 17 U/L — SIGNIFICANT CHANGE UP (ref 10–45)
ANION GAP SERPL CALC-SCNC: 15 MMOL/L — SIGNIFICANT CHANGE UP (ref 5–17)
APTT BLD: 33.6 SEC — SIGNIFICANT CHANGE UP (ref 24.5–35.6)
AST SERPL-CCNC: 23 U/L — SIGNIFICANT CHANGE UP (ref 10–40)
BASE EXCESS BLDV CALC-SCNC: 5.8 MMOL/L — HIGH (ref -2–3)
BASOPHILS # BLD AUTO: 0.02 K/UL — SIGNIFICANT CHANGE UP (ref 0–0.2)
BASOPHILS NFR BLD AUTO: 0.1 % — SIGNIFICANT CHANGE UP (ref 0–2)
CALCIUM SERPL-MCNC: 8 MG/DL — LOW (ref 8.4–10.5)
CHLORIDE SERPL-SCNC: 95 MMOL/L — LOW (ref 96–108)
CO2 BLDV-SCNC: 31 MMOL/L — HIGH (ref 22–26)
CO2 SERPL-SCNC: 25 MMOL/L — SIGNIFICANT CHANGE UP (ref 22–31)
CREAT SERPL-MCNC: 3.74 MG/DL — HIGH (ref 0.5–1.3)
EGFR: 18 ML/MIN/1.73M2 — LOW
EGFR: 18 ML/MIN/1.73M2 — LOW
EOSINOPHIL # BLD AUTO: 0.11 K/UL — SIGNIFICANT CHANGE UP (ref 0–0.5)
EOSINOPHIL NFR BLD AUTO: 0.8 % — SIGNIFICANT CHANGE UP (ref 0–6)
GAS PNL BLDA: SIGNIFICANT CHANGE UP
GAS PNL BLDA: SIGNIFICANT CHANGE UP
GAS PNL BLDV: SIGNIFICANT CHANGE UP
GLUCOSE SERPL-MCNC: 122 MG/DL — HIGH (ref 70–99)
HCO3 BLDV-SCNC: 30 MMOL/L — HIGH (ref 22–29)
HCT VFR BLD CALC: 25.9 % — LOW (ref 39–50)
HGB BLD-MCNC: 8.3 G/DL — LOW (ref 13–17)
HOROWITZ INDEX BLDV+IHG-RTO: 40 — SIGNIFICANT CHANGE UP
IMM GRANULOCYTES NFR BLD AUTO: 0.7 % — SIGNIFICANT CHANGE UP (ref 0–0.9)
LYMPHOCYTES # BLD AUTO: 2.8 % — LOW (ref 13–44)
MAGNESIUM SERPL-MCNC: 2 MG/DL — SIGNIFICANT CHANGE UP (ref 1.6–2.6)
MCHC RBC-ENTMCNC: 29.6 PG — SIGNIFICANT CHANGE UP (ref 27–34)
MCHC RBC-ENTMCNC: 32 G/DL — SIGNIFICANT CHANGE UP (ref 32–36)
MCV RBC AUTO: 92.5 FL — SIGNIFICANT CHANGE UP (ref 80–100)
MONOCYTES # BLD AUTO: 0.62 K/UL — SIGNIFICANT CHANGE UP (ref 0–0.9)
MONOCYTES NFR BLD AUTO: 4.4 % — SIGNIFICANT CHANGE UP (ref 2–14)
NEUTROPHILS # BLD AUTO: 12.86 K/UL — HIGH (ref 1.8–7.4)
NEUTROPHILS NFR BLD AUTO: 91.2 % — HIGH (ref 43–77)
NRBC # BLD: 0 /100 WBCS — SIGNIFICANT CHANGE UP (ref 0–0)
NRBC BLD-RTO: 0 /100 WBCS — SIGNIFICANT CHANGE UP (ref 0–0)
PCO2 BLDV: 40 MMHG — LOW (ref 42–55)
PH BLDV: 7.48 — HIGH (ref 7.32–7.43)
PHOSPHATE SERPL-MCNC: 4.2 MG/DL — SIGNIFICANT CHANGE UP (ref 2.5–4.5)
PLATELET # BLD AUTO: 280 K/UL — SIGNIFICANT CHANGE UP (ref 150–400)
PO2 BLDV: 66 MMHG — HIGH (ref 25–45)
POTASSIUM SERPL-MCNC: 4.1 MMOL/L — SIGNIFICANT CHANGE UP (ref 3.5–5.3)
POTASSIUM SERPL-SCNC: 4.1 MMOL/L — SIGNIFICANT CHANGE UP (ref 3.5–5.3)
PROT SERPL-MCNC: 5.3 G/DL — LOW (ref 6–8.3)
PROTHROM AB SERPL-ACNC: 17.2 SEC — HIGH (ref 9.9–13.4)
RBC # BLD: 2.8 M/UL — LOW (ref 4.2–5.8)
RBC # FLD: 20.4 % — HIGH (ref 10.3–14.5)
SAO2 % BLDV: 93.3 % — HIGH (ref 67–88)
SODIUM SERPL-SCNC: 135 MMOL/L — SIGNIFICANT CHANGE UP (ref 135–145)
WBC # FLD AUTO: 14.1 K/UL — HIGH (ref 3.8–10.5)

## 2025-01-16 PROCEDURE — 31600 PLANNED TRACHEOSTOMY: CPT | Mod: 78,GC

## 2025-01-16 PROCEDURE — G0545: CPT

## 2025-01-16 PROCEDURE — 31624 DX BRONCHOSCOPE/LAVAGE: CPT

## 2025-01-16 PROCEDURE — 99232 SBSQ HOSP IP/OBS MODERATE 35: CPT

## 2025-01-16 PROCEDURE — 71045 X-RAY EXAM CHEST 1 VIEW: CPT | Mod: 26,76

## 2025-01-16 PROCEDURE — 93971 EXTREMITY STUDY: CPT | Mod: 26,RT

## 2025-01-16 PROCEDURE — 99291 CRITICAL CARE FIRST HOUR: CPT

## 2025-01-16 PROCEDURE — 99292 CRITICAL CARE ADDL 30 MIN: CPT | Mod: 25

## 2025-01-16 RX ORDER — ACETAMINOPHEN 500 MG/5ML
1000 LIQUID (ML) ORAL ONCE
Refills: 0 | Status: COMPLETED | OUTPATIENT
Start: 2025-01-16 | End: 2025-01-16

## 2025-01-16 RX ORDER — CYST/ALA/Q10/PHOS.SER/DHA/BROC 100-20-50
15 POWDER (GRAM) ORAL DAILY
Refills: 0 | Status: DISCONTINUED | OUTPATIENT
Start: 2025-01-16 | End: 2025-01-16

## 2025-01-16 RX ORDER — FENTANYL CITRATE-0.9 % NACL/PF 100MCG/2ML
50 SYRINGE (ML) INTRAVENOUS ONCE
Refills: 0 | Status: DISCONTINUED | OUTPATIENT
Start: 2025-01-16 | End: 2025-01-16

## 2025-01-16 RX ORDER — PROPOFOL 10 MG/ML
30 INJECTION, EMULSION INTRAVENOUS
Qty: 1000 | Refills: 0 | Status: DISCONTINUED | OUTPATIENT
Start: 2025-01-16 | End: 2025-01-16

## 2025-01-16 RX ORDER — HYDROMORPHONE/SOD CHLOR,ISO/PF 2 MG/10 ML
0.5 SYRINGE (ML) INJECTION ONCE
Refills: 0 | Status: DISCONTINUED | OUTPATIENT
Start: 2025-01-16 | End: 2025-01-16

## 2025-01-16 RX ORDER — PROPOFOL 10 MG/ML
30 INJECTION, EMULSION INTRAVENOUS
Qty: 500 | Refills: 0 | Status: DISCONTINUED | OUTPATIENT
Start: 2025-01-16 | End: 2025-01-16

## 2025-01-16 RX ORDER — ACETYLCYSTEINE 200 MG/ML
4 INHALANT RESPIRATORY (INHALATION) EVERY 6 HOURS
Refills: 0 | Status: DISCONTINUED | OUTPATIENT
Start: 2025-01-16 | End: 2025-01-20

## 2025-01-16 RX ORDER — SUGAMMADEX 100 MG/ML
200 INJECTION, SOLUTION INTRAVENOUS ONCE
Refills: 0 | Status: COMPLETED | OUTPATIENT
Start: 2025-01-16 | End: 2025-01-16

## 2025-01-16 RX ORDER — PROPOFOL 10 MG/ML
80 INJECTION, EMULSION INTRAVENOUS ONCE
Refills: 0 | Status: COMPLETED | OUTPATIENT
Start: 2025-01-16 | End: 2025-01-16

## 2025-01-16 RX ORDER — ROCURONIUM BROMIDE 10 MG/ML
100 INJECTION, SOLUTION INTRAVENOUS ONCE
Refills: 0 | Status: COMPLETED | OUTPATIENT
Start: 2025-01-16 | End: 2025-01-16

## 2025-01-16 RX ORDER — PROPOFOL 10 MG/ML
30 INJECTION, EMULSION INTRAVENOUS
Qty: 1000 | Refills: 0 | Status: DISCONTINUED | OUTPATIENT
Start: 2025-01-16 | End: 2025-01-17

## 2025-01-16 RX ORDER — MAGNESIUM SULFATE 500 MG/ML
2 SYRINGE (ML) INJECTION ONCE
Refills: 0 | Status: COMPLETED | OUTPATIENT
Start: 2025-01-16 | End: 2025-01-16

## 2025-01-16 RX ADMIN — IPRATROPIUM BROMIDE AND ALBUTEROL SULFATE 3 MILLILITER(S): .5; 2.5 SOLUTION RESPIRATORY (INHALATION) at 11:11

## 2025-01-16 RX ADMIN — AMIODARONE HYDROCHLORIDE 200 MILLIGRAM(S): 50 INJECTION, SOLUTION INTRAVENOUS at 05:19

## 2025-01-16 RX ADMIN — ROCURONIUM BROMIDE 100 MILLIGRAM(S): 10 INJECTION, SOLUTION INTRAVENOUS at 12:13

## 2025-01-16 RX ADMIN — IPRATROPIUM BROMIDE AND ALBUTEROL SULFATE 3 MILLILITER(S): .5; 2.5 SOLUTION RESPIRATORY (INHALATION) at 23:07

## 2025-01-16 RX ADMIN — DOBUTAMINE 5.11 MICROGRAM(S)/KG/MIN: 250 INJECTION INTRAVENOUS at 07:31

## 2025-01-16 RX ADMIN — Medication 81 MILLIGRAM(S): at 14:20

## 2025-01-16 RX ADMIN — IPRATROPIUM BROMIDE AND ALBUTEROL SULFATE 3 MILLILITER(S): .5; 2.5 SOLUTION RESPIRATORY (INHALATION) at 18:19

## 2025-01-16 RX ADMIN — Medication 10 MILLIGRAM(S): at 22:00

## 2025-01-16 RX ADMIN — ACETYLCYSTEINE 4 MILLILITER(S): 200 INHALANT RESPIRATORY (INHALATION) at 18:21

## 2025-01-16 RX ADMIN — PROPOFOL 15.3 MICROGRAM(S)/KG/MIN: 10 INJECTION, EMULSION INTRAVENOUS at 19:30

## 2025-01-16 RX ADMIN — Medication 15 MILLILITER(S): at 17:33

## 2025-01-16 RX ADMIN — Medication 1000 MILLIGRAM(S): at 21:10

## 2025-01-16 RX ADMIN — Medication 125 MILLIGRAM(S): at 17:33

## 2025-01-16 RX ADMIN — Medication 50 MICROGRAM(S): at 12:15

## 2025-01-16 RX ADMIN — PROPOFOL 80 MILLIGRAM(S): 10 INJECTION, EMULSION INTRAVENOUS at 19:17

## 2025-01-16 RX ADMIN — Medication 50 MICROGRAM(S): at 13:00

## 2025-01-16 RX ADMIN — Medication 4 MILLILITER(S): at 05:06

## 2025-01-16 RX ADMIN — TOBRAMYCIN 300 MILLIGRAM(S): 300 SOLUTION RESPIRATORY (INHALATION) at 18:20

## 2025-01-16 RX ADMIN — Medication 0.5 MILLIGRAM(S): at 02:24

## 2025-01-16 RX ADMIN — Medication 1 SPRAY(S): at 05:19

## 2025-01-16 RX ADMIN — Medication 1 TABLET(S): at 14:18

## 2025-01-16 RX ADMIN — Medication 10 MILLIGRAM(S): at 05:18

## 2025-01-16 RX ADMIN — GABAPENTIN 100 MILLIGRAM(S): 400 CAPSULE ORAL at 17:33

## 2025-01-16 RX ADMIN — DOBUTAMINE 5.11 MICROGRAM(S)/KG/MIN: 250 INJECTION INTRAVENOUS at 20:23

## 2025-01-16 RX ADMIN — TOBRAMYCIN 300 MILLIGRAM(S): 300 SOLUTION RESPIRATORY (INHALATION) at 05:07

## 2025-01-16 RX ADMIN — Medication 400 MILLIGRAM(S): at 20:40

## 2025-01-16 RX ADMIN — MEROPENEM 100 MILLIGRAM(S): 1 INJECTION INTRAVENOUS at 23:35

## 2025-01-16 RX ADMIN — INSULIN LISPRO 2: 100 INJECTION, SOLUTION INTRAVENOUS; SUBCUTANEOUS at 12:47

## 2025-01-16 RX ADMIN — Medication 25 GRAM(S): at 02:55

## 2025-01-16 RX ADMIN — Medication 1 APPLICATION(S): at 22:13

## 2025-01-16 RX ADMIN — INSULIN LISPRO 2: 100 INJECTION, SOLUTION INTRAVENOUS; SUBCUTANEOUS at 17:33

## 2025-01-16 RX ADMIN — Medication 10 MILLIGRAM(S): at 14:21

## 2025-01-16 RX ADMIN — DEXMEDETOMIDINE HYDROCHLORIDE IN SODIUM CHLORIDE 10.6 MICROGRAM(S)/KG/HR: 4 INJECTION INTRAVENOUS at 07:31

## 2025-01-16 RX ADMIN — ATORVASTATIN CALCIUM 80 MILLIGRAM(S): 80 TABLET, FILM COATED ORAL at 22:00

## 2025-01-16 RX ADMIN — SUGAMMADEX 200 MILLIGRAM(S): 100 INJECTION, SOLUTION INTRAVENOUS at 17:42

## 2025-01-16 RX ADMIN — Medication 125 MILLIGRAM(S): at 05:19

## 2025-01-16 RX ADMIN — ACETYLCYSTEINE 4 MILLILITER(S): 200 INHALANT RESPIRATORY (INHALATION) at 05:07

## 2025-01-16 RX ADMIN — Medication 0.5 MILLIGRAM(S): at 01:54

## 2025-01-16 RX ADMIN — Medication 1 APPLICATION(S): at 05:20

## 2025-01-16 RX ADMIN — Medication 500 MILLIGRAM(S): at 14:21

## 2025-01-16 RX ADMIN — Medication 1 SPRAY(S): at 17:34

## 2025-01-16 RX ADMIN — ACETYLCYSTEINE 4 MILLILITER(S): 200 INHALANT RESPIRATORY (INHALATION) at 11:11

## 2025-01-16 RX ADMIN — Medication 40 MILLIGRAM(S): at 14:20

## 2025-01-16 RX ADMIN — ACETYLCYSTEINE 4 MILLILITER(S): 200 INHALANT RESPIRATORY (INHALATION) at 23:07

## 2025-01-16 RX ADMIN — IPRATROPIUM BROMIDE AND ALBUTEROL SULFATE 3 MILLILITER(S): .5; 2.5 SOLUTION RESPIRATORY (INHALATION) at 05:06

## 2025-01-16 RX ADMIN — Medication 100 MILLIGRAM(S): at 22:01

## 2025-01-16 RX ADMIN — Medication 15 MILLILITER(S): at 05:19

## 2025-01-16 RX ADMIN — GABAPENTIN 100 MILLIGRAM(S): 400 CAPSULE ORAL at 05:18

## 2025-01-16 RX ADMIN — Medication 50 MICROGRAM(S): at 12:47

## 2025-01-16 NOTE — PROGRESS NOTE ADULT - SUBJECTIVE AND OBJECTIVE BOX
Patient seen and examined at the bedside.    Remains critically ill on continuous ICU monitoring.    Brief Summary:  53 yo M with multiple medical problems including CAD s/p PCI in April 2024 s/p CABG x 3 on 12/30/24 1/2: Intubated for hypoxia & left lung white out s/p awake bronch with removal of copious mucopurulent secretions  1/4: s/p IABP insertion, sepsis/septic shock due to E coli     24 Hour events:  Placed on high flow nasal cannula overnight.  Left lung kushal out. TF held for bronchoscopy yesterday.    Objective:  Vital Signs Last 24 Hrs  T(C): 37.5 (16 Jan 2025 00:00), Max: 37.5 (16 Jan 2025 00:00)  T(F): 99.5 (16 Jan 2025 00:00), Max: 99.5 (16 Jan 2025 00:00)  HR: 78 (16 Jan 2025 05:13) (65 - 86)  BP: 115/57 (16 Jan 2025 04:00) (107/51 - 150/60)  BP(mean): 82 (16 Jan 2025 04:00) (73 - 98)  RR: 15 (16 Jan 2025 04:00) (10 - 40)  SpO2: 100% (16 Jan 2025 05:13) (93% - 100%)    Parameters below as of 16 Jan 2025 05:13  Patient On (Oxygen Delivery Method): ventilator    Mode: AC/ CMV (Assist Control/ Continuous Mandatory Ventilation)  RR (machine): 14  FiO2: 40  PEEP: 10  ITime: 1  MAP: 14  PC: 15  PIP: 26    Physical Exam:  General: Alert and interactive, able to ambulate.   Neurology: Oriented, following commands  Respiratory: Diminished on left.  CV: Sinus  Abdominal: Soft, Nontender  Extremities: Warm, well-perfused  -------------------------------------------------------------------------------------------------------------------------------    Labs:                        8.3    14.10 )-----------( 280      ( 16 Jan 2025 00:41 )             25.9     01-16    135  |  95[L]  |  41[H]  ----------------------------<  122[H]  4.1   |  25  |  3.74[H]    Ca    8.0[L]      16 Jan 2025 00:41  Phos  4.2     01-16  Mg     2.0     01-16    TPro  5.3[L]  /  Alb  2.5[L]  /  TBili  0.7  /  DBili  x   /  AST  23  /  ALT  17  /  AlkPhos  75  01-16    LIVER FUNCTIONS - ( 16 Jan 2025 00:41 )  Alb: 2.5 g/dL / Pro: 5.3 g/dL / ALK PHOS: 75 U/L / ALT: 17 U/L / AST: 23 U/L / GGT: x           PT/INR - ( 16 Jan 2025 00:51 )   PT: 17.2 sec;   INR: 1.50 ratio       PTT - ( 16 Jan 2025 00:51 )  PTT:33.6 sec  ABG - ( 16 Jan 2025 00:17 )  pH, Arterial: 7.48  pH, Blood: x     /  pCO2: 40    /  pO2: 116   / HCO3: 30    / Base Excess: 5.8   /  SaO2: 99.2    ------------------------------------------------------------------------------------------------------------------------------  Assessment:  53 yo M s/p CABG x 3 on 12/30/24    Postop acute pulmonary insufficiency  Cardiogenic and Septic shock  Acute blood loss anemia  Ascites    ESRD on iHD   E coli bacteremia 2/2 pneumonia  Leukocytosis     Plan:  ***Neuro***  Postoperative acute pain control with Gabapentin and prns.  Trazodone nightly  PT ongoing.    ***Cardiovascular***  s/p CABG x 3  Remains on low dose Dobutamine  A fib s/p cardioversion - Amiodarone, completing load.  Hydralazine for hypertension.  ASA / Statin daily    ***Pulmonary***  Postoperative acute respiratory failure  Bronchoscopy yesterday.  Aggressive pulmonary clearance, [place], mobilization, wean oxygen as able.    ***GI***  On Tube feeds.  Protonix for stress ulcer prophylaxis.   Ascites: Last paracentesis 1/11    ***Renal***  ESRD - HD yesterday.  LUE AV fistula     ***ID***  Sepsis/septic shock due to ESBL E. coli - Continue Meropenem. Trend leukocytosis   Diflucan empirically.  Also on Mitch nebs.  PO Vancomycin for C diff prophylaxis   Mccauley stain ordered for diarrhea.    ***Endocrine***  Hyperglycemia - Insulin sliding scale    ***Hematology***  Acute blood loss anemia  No current transfusion indication.  Epogen.        Care plan discussed with the ICU care team.   Patient remains critical, at risk for life threatening decompensation.    I have spent 30 minutes providing critical care management to this patient.    By signing my name below, I, Baldemar Hernandez, attest that this documentation has been prepared under the direction and in the presence of Jen Sierra MD.  Electronically signed: Baldemar Hernandez, 01-16-25 @ 06:19    I, Jen Sierra,  personally performed the services described in this documentation. All medical record entries made by the scribe were at my direction and in my presence. I have reviewed the chart and agree that the record reflects my personal performance and is accurate and complete  Electronically signed: eJn Sierra MD. Patient seen and examined at the bedside.    Remains critically ill on continuous ICU monitoring.    Brief Summary:  55 yo M with multiple medical problems including CAD s/p PCI in April 2024 s/p CABG x 3 on 12/30/24 1/2: Intubated for hypoxia & left lung white out s/p awake bronch with removal of copious mucopurulent secretions  1/4: s/p IABP insertion, sepsis/septic shock due to E coli   1/15: Emergently reintubated.    24 Hour events:  Emergently intubated yesterday.  Bronchoscopy with copious left sided secretions - BAL sent.  TF held for tracheostomy      Objective:  Vital Signs Last 24 Hrs  T(C): 37.5 (16 Jan 2025 00:00), Max: 37.5 (16 Jan 2025 00:00)  T(F): 99.5 (16 Jan 2025 00:00), Max: 99.5 (16 Jan 2025 00:00)  HR: 78 (16 Jan 2025 05:13) (65 - 86)  BP: 115/57 (16 Jan 2025 04:00) (107/51 - 150/60)  BP(mean): 82 (16 Jan 2025 04:00) (73 - 98)  RR: 15 (16 Jan 2025 04:00) (10 - 40)  SpO2: 100% (16 Jan 2025 05:13) (93% - 100%)    Parameters below as of 16 Jan 2025 05:13  Patient On (Oxygen Delivery Method): ventilator    Mode: AC/ CMV (Assist Control/ Continuous Mandatory Ventilation)  RR (machine): 14  FiO2: 40  PEEP: 10  ITime: 1  MAP: 14  PC: 15  PIP: 26    Physical Exam:  General: Alert and interactive, intubated.  Neurology: Oriented, following commands  Respiratory: Breath sounds bilaterally  CV: Sinus  Abdominal: Soft, Nontender  Extremities: Warm, well-perfused  -------------------------------------------------------------------------------------------------------------------------------    Labs:                        8.3    14.10 )-----------( 280      ( 16 Jan 2025 00:41 )             25.9     01-16    135  |  95[L]  |  41[H]  ----------------------------<  122[H]  4.1   |  25  |  3.74[H]    Ca    8.0[L]      16 Jan 2025 00:41  Phos  4.2     01-16  Mg     2.0     01-16    TPro  5.3[L]  /  Alb  2.5[L]  /  TBili  0.7  /  DBili  x   /  AST  23  /  ALT  17  /  AlkPhos  75  01-16    LIVER FUNCTIONS - ( 16 Jan 2025 00:41 )  Alb: 2.5 g/dL / Pro: 5.3 g/dL / ALK PHOS: 75 U/L / ALT: 17 U/L / AST: 23 U/L / GGT: x           PT/INR - ( 16 Jan 2025 00:51 )   PT: 17.2 sec;   INR: 1.50 ratio       PTT - ( 16 Jan 2025 00:51 )  PTT:33.6 sec  ABG - ( 16 Jan 2025 00:17 )  pH, Arterial: 7.48  pH, Blood: x     /  pCO2: 40    /  pO2: 116   / HCO3: 30    / Base Excess: 5.8   /  SaO2: 99.2      ------------------------------------------------------------------------------------------------------------------------------  Assessment:  55 yo M s/p CABG x 3 on 12/30/24    Postop acute respiratory failure  Acute blood loss anemia  Ascites    ESRD on iHD   E coli bacteremia 2/2 pneumonia  Leukocytosis     Plan:  ***Neuro***  Postoperative acute pain control with Gabapentin and prns.  Precedex if needed to help with sleep in addition to Trazodone nightly  PT ongoing.    ***Cardiovascular***  s/p CABG x 3  Remains on low dose Dobutamine  A fib s/p cardioversion - Amiodarone  Hydralazine for hypertension.  ASA / Statin daily    ***Pulmonary***  Postoperative acute respiratory failure  Tracheostomy today.  Bronchoscopy daily.    ***GI***  On Tube feeds.  Protonix for stress ulcer prophylaxis.   Ascites: Last paracentesis 1/11    ***Renal***  ESRD - HD yesterday.  LUE AV fistula     ***ID***  Sepsis/septic shock due to ESBL E. coli - Continue Meropenem. Trend leukocytosis   Diflucan empirically.  Also on Mitch nebs.  PO Vancomycin for C diff prophylaxis   Mccauley stain negative.  Central line change today.    ***Endocrine***  Hyperglycemia - Insulin sliding scale    ***Hematology***  Acute blood loss anemia  No current transfusion indication, trend CBC and monitor for bleeding.  Epogen.      Care plan discussed with the ICU care team.   Patient remains critical, at risk for life threatening decompensation.    I have spent 65 minutes providing critical care management to this patient.    By signing my name below, I, Baldemar Hernandez, attest that this documentation has been prepared under the direction and in the presence of Jen Sierra MD.  Electronically signed: Baldemar Hernandez, 01-16-25 @ 06:19    I, Jen Sierra, personally performed the services described in this documentation. All medical record entries made by the scribe were at my direction and in my presence. I have reviewed the chart and agree that the record reflects my personal performance and is accurate and complete  Electronically signed: Jen Sierra MD.

## 2025-01-16 NOTE — PROGRESS NOTE ADULT - ASSESSMENT
54M with PMH of HTN, HLD, ESRD on HD MWF via LUE AVF, ascites (requiring repeat paracenteses q4-6wks), NICM with moderate LV dysfunction w/ EF 35-40%(2023) and CAD s/p atherectomy + SALLIE to D1(4/11/2024) presents to [facility] transferred from [facility] for CABG eval  Nephro on [facility] for HD    ESRD on HD   Regular schedule MWF   Access LUE AVF  Center [facility]  Nephrologist Dr. Bailey  Last HD on 12/24  S/p HD on 12/27 12/29, 12/30 for hyperkalemia and 12/31  2 L PUF On 01/02/2024  However hospital course now complicated by Cardiogenic shock now on multiple pressors,   CRRT initiated 01/03, off since 01/08   S/p PUF on 01/09   HD held on 01/10 due to instability,  S/p HD on 01/11 2 L UF rmeoved  S/p HD on 01/13 and 01/15   HD tomm per cristin, s/p trach placement today  Keep MAP>65  Renal Diet  Consent in physical chart    Hyper/Hypokalemia  Monitor K, will change dialysate fluid as needed    HTN  currently on pressure support  monitor bp     CKD MBD  Can send a PTH  Ca and Phos daily    Anemia  CRISTIANE with HD  Transfuse if hgb <7    CAD with multivessel disease  s/p CABG 12/30  CTICU following

## 2025-01-16 NOTE — PROGRESS NOTE ADULT - SUBJECTIVE AND OBJECTIVE BOX
Patient is a 54y old  Male who presents with a chief complaint of CAD s/p CABG (14 Jan 2025 07:05)    Being followed by ID for        Interval history:  events noted  pt reintubated in CTU  No other acute events      ROS:  No cough,SOB,CP  No N/V/D  No abd pain  No urinary complaints  No HA  No joint or limb pain  No other complaints    PAST MEDICAL & SURGICAL HISTORY:  Type 2 diabetes mellitus      Hypertension      End stage renal disease  started HD 2/2019 T, Th, Sat via right chest permacath      Bone spur  right shoulder- hx of - sx done      Anemia      Injury of right wrist  hx of at age 14      History of hyperkalemia  before HD- K-6.2 - to repeat in am of sx, pt had HD today      S/P arthroscopy of right shoulder  2010      History of vascular access device  right chest permacath - 2/2019      H/O right wrist surgery  tendons repair - at age 14      AV fistula      H/O ventral hernia repair      S/P cholecystectomy        Allergies    No Known Allergies    Intolerances      Antimicrobials:    meropenem  IVPB 500 milliGRAM(s) IV Intermittent every 24 hours  tobramycin for Nebulization 300 milliGRAM(s) Inhalation every 12 hours  vancomycin    Solution 125 milliGRAM(s) Oral every 12 hours    MEDICATIONS  (STANDING):  acetylcysteine 20%  Inhalation 4 milliLiter(s) Inhalation every 12 hours  albuterol/ipratropium for Nebulization 3 milliLiter(s) Nebulizer every 6 hours  aMIOdarone    Tablet   Oral   aMIOdarone    Tablet 200 milliGRAM(s) Oral daily  ascorbic acid 500 milliGRAM(s) Oral daily  aspirin  chewable 81 milliGRAM(s) Oral daily  atorvastatin 80 milliGRAM(s) Oral at bedtime  bisacodyl Suppository 10 milliGRAM(s) Rectal once  chlorhexidine 0.12% Liquid 15 milliLiter(s) Oral Mucosa every 12 hours  chlorhexidine 2% Cloths 1 Application(s) Topical daily  chlorhexidine 4% Liquid 1 Application(s) Topical <User Schedule>  dexMEDEtomidine Infusion 0.5 MICROgram(s)/kG/Hr (10.6 mL/Hr) IV Continuous <Continuous>  dextrose 50% Injectable 50 milliLiter(s) IV Push every 15 minutes  dextrose 50% Injectable 25 milliLiter(s) IV Push every 15 minutes  DOBUTamine Infusion 2 MICROgram(s)/kG/Min (5.11 mL/Hr) IV Continuous <Continuous>  epoetin ignacia (EPOGEN) Injectable 96010 Unit(s) IV Push <User Schedule>  gabapentin 100 milliGRAM(s) Oral every 12 hours  hydrALAZINE 10 milliGRAM(s) Oral every 8 hours  insulin lispro (ADMELOG) corrective regimen sliding scale   SubCutaneous every 6 hours  insulin regular Infusion 2 Unit(s)/Hr (2 mL/Hr) IV Continuous <Continuous>  meropenem  IVPB 500 milliGRAM(s) IV Intermittent every 24 hours  Nephro-elicia 1 Tablet(s) Oral daily  pantoprazole  Injectable 40 milliGRAM(s) IV Push daily  propofol Injectable 40 milliGRAM(s) IV Push once  sodium chloride 0.65% Nasal 1 Spray(s) Both Nostrils every 12 hours  sodium chloride 0.9%. 1000 milliLiter(s) (10 mL/Hr) IV Continuous <Continuous>  sodium chloride 3%  Inhalation 4 milliLiter(s) Inhalation every 6 hours  tobramycin for Nebulization 300 milliGRAM(s) Inhalation every 12 hours  traZODone 100 milliGRAM(s) Oral at bedtime  vancomycin    Solution 125 milliGRAM(s) Oral every 12 hours      Vital Signs Last 24 Hrs  T(C): 37 (01-16-25 @ 08:00), Max: 37.6 (01-16-25 @ 04:00)  T(F): 98.6 (01-16-25 @ 08:00), Max: 99.6 (01-16-25 @ 04:00)  HR: 71 (01-16-25 @ 09:05) (65 - 86)  BP: 110/58 (01-16-25 @ 09:00) (107/51 - 150/60)  BP(mean): 80 (01-16-25 @ 09:00) (73 - 98)  RR: 16 (01-16-25 @ 09:00) (10 - 30)  SpO2: 100% (01-16-25 @ 09:05) (93% - 100%)    Physical Exam:    Constitutional well preserved,comfortable,pleasant    HEENT PERRLA EOMI,No pallor or icterus    No oral exudate or erythema    Neck supple no JVD or LN    Chest Good AE,CTA    CVS RRR S1 S2 WNl No murmur or rub or gallop    Abd soft BS normal No tenderness no masses    Ext No cyanosis clubbing or edema    IV site no erythema tenderness or discharge    Joints no swelling or LOM    CNS AAO X 3 no focal    Lab Data:                          8.3    14.10 )-----------( 280      ( 16 Jan 2025 00:41 )             25.9       01-16    135  |  95[L]  |  41[H]  ----------------------------<  122[H]  4.1   |  25  |  3.74[H]    Ca    8.0[L]      16 Jan 2025 00:41  Phos  4.2     01-16  Mg     2.0     01-16    TPro  5.3[L]  /  Alb  2.5[L]  /  TBili  0.7  /  DBili  x   /  AST  23  /  ALT  17  /  AlkPhos  75  01-16            Bronchial  01-11-25   Culture is being performed.  --  --      Bronchial  01-10-25   Commensal manjit consistent with body site  --    No polymorphonuclear leukocytes seen per low power field  No squamous epithelial cells seen per low power field  No organisms seen per oil power field      .Blood BLOOD  01-09-25   No growth at 5 days  --  --        WBC Count: 14.10 (01-16-25 @ 00:41)  WBC Count: 17.07 (01-15-25 @ 00:22)  WBC Count: 21.76 (01-14-25 @ 00:26)  WBC Count: 21.73 (01-13-25 @ 00:26)  WBC Count: 21.67 (01-12-25 @ 00:30)  WBC Count: 29.61 (01-11-25 @ 00:32)  WBC Count: 33.53 (01-10-25 @ 00:59)       Bilirubin Total: 0.7 mg/dL (01-16-25 @ 00:41)  Aspartate Aminotransferase (AST/SGOT): 23 U/L (01-16-25 @ 00:41)  Alanine Aminotransferase (ALT/SGPT): 17 U/L (01-16-25 @ 00:41)  Alkaline Phosphatase: 75 U/L (01-16-25 @ 00:41)  Bilirubin Total: 0.7 mg/dL (01-15-25 @ 00:22)  Aspartate Aminotransferase (AST/SGOT): 31 U/L (01-15-25 @ 00:22)  Alanine Aminotransferase (ALT/SGPT): 23 U/L (01-15-25 @ 00:22)  Alkaline Phosphatase: 88 U/L (01-15-25 @ 00:22)  Bilirubin Total: 0.7 mg/dL (01-14-25 @ 00:26)  Aspartate Aminotransferase (AST/SGOT): 21 U/L (01-14-25 @ 00:26)  Alanine Aminotransferase (ALT/SGPT): 26 U/L (01-14-25 @ 00:26)  Alkaline Phosphatase: 85 U/L (01-14-25 @ 00:26)  Bilirubin Total: 1.0 mg/dL (01-13-25 @ 00:26)  Aspartate Aminotransferase (AST/SGOT): 23 U/L (01-13-25 @ 00:26)  Alanine Aminotransferase (ALT/SGPT): 36 U/L (01-13-25 @ 00:26)  Alkaline Phosphatase: 85 U/L (01-13-25 @ 00:26)  Bilirubin Total: 1.3 mg/dL (01-12-25 @ 00:30)  Aspartate Aminotransferase (AST/SGOT): 24 U/L (01-12-25 @ 00:30)  Alanine Aminotransferase (ALT/SGPT): 48 U/L (01-12-25 @ 00:30)  Alkaline Phosphatase: 91 U/L (01-12-25 @ 00:30)         Patient is a 54y old  Male who presents with a chief complaint of CAD s/p CABG (14 Jan 2025 07:05)    Being followed by ID for elevated WBC        Interval history:  events noted  pt reintubated in CTU  pt s/p bronchosocopy  No other acute events      PAST MEDICAL & SURGICAL HISTORY:  Type 2 diabetes mellitus      Hypertension      End stage renal disease  started HD 2/2019 T, Th, Sat via right chest permacath      Bone spur  right shoulder- hx of - sx done      Anemia      Injury of right wrist  hx of at age 14      History of hyperkalemia  before HD- K-6.2 - to repeat in am of sx, pt had HD today      S/P arthroscopy of right shoulder  2010      History of vascular access device  right chest permacath - 2/2019      H/O right wrist surgery  tendons repair - at age 14      AV fistula      H/O ventral hernia repair      S/P cholecystectomy        Allergies    No Known Allergies    Intolerances      Antimicrobials:    meropenem  IVPB 500 milliGRAM(s) IV Intermittent every 24 hours  tobramycin for Nebulization 300 milliGRAM(s) Inhalation every 12 hours  vancomycin    Solution 125 milliGRAM(s) Oral every 12 hours    MEDICATIONS  (STANDING):  acetylcysteine 20%  Inhalation 4 milliLiter(s) Inhalation every 12 hours  albuterol/ipratropium for Nebulization 3 milliLiter(s) Nebulizer every 6 hours  aMIOdarone    Tablet   Oral   aMIOdarone    Tablet 200 milliGRAM(s) Oral daily  ascorbic acid 500 milliGRAM(s) Oral daily  aspirin  chewable 81 milliGRAM(s) Oral daily  atorvastatin 80 milliGRAM(s) Oral at bedtime  bisacodyl Suppository 10 milliGRAM(s) Rectal once  chlorhexidine 0.12% Liquid 15 milliLiter(s) Oral Mucosa every 12 hours  chlorhexidine 2% Cloths 1 Application(s) Topical daily  chlorhexidine 4% Liquid 1 Application(s) Topical <User Schedule>  dexMEDEtomidine Infusion 0.5 MICROgram(s)/kG/Hr (10.6 mL/Hr) IV Continuous <Continuous>  dextrose 50% Injectable 50 milliLiter(s) IV Push every 15 minutes  dextrose 50% Injectable 25 milliLiter(s) IV Push every 15 minutes  DOBUTamine Infusion 2 MICROgram(s)/kG/Min (5.11 mL/Hr) IV Continuous <Continuous>  epoetin ignacia (EPOGEN) Injectable 09921 Unit(s) IV Push <User Schedule>  gabapentin 100 milliGRAM(s) Oral every 12 hours  hydrALAZINE 10 milliGRAM(s) Oral every 8 hours  insulin lispro (ADMELOG) corrective regimen sliding scale   SubCutaneous every 6 hours  insulin regular Infusion 2 Unit(s)/Hr (2 mL/Hr) IV Continuous <Continuous>  meropenem  IVPB 500 milliGRAM(s) IV Intermittent every 24 hours  Nephro-elicia 1 Tablet(s) Oral daily  pantoprazole  Injectable 40 milliGRAM(s) IV Push daily  propofol Injectable 40 milliGRAM(s) IV Push once  sodium chloride 0.65% Nasal 1 Spray(s) Both Nostrils every 12 hours  sodium chloride 0.9%. 1000 milliLiter(s) (10 mL/Hr) IV Continuous <Continuous>  sodium chloride 3%  Inhalation 4 milliLiter(s) Inhalation every 6 hours  tobramycin for Nebulization 300 milliGRAM(s) Inhalation every 12 hours  traZODone 100 milliGRAM(s) Oral at bedtime  vancomycin    Solution 125 milliGRAM(s) Oral every 12 hours      Vital Signs Last 24 Hrs  T(C): 37 (01-16-25 @ 08:00), Max: 37.6 (01-16-25 @ 04:00)  T(F): 98.6 (01-16-25 @ 08:00), Max: 99.6 (01-16-25 @ 04:00)  HR: 71 (01-16-25 @ 09:05) (65 - 86)  BP: 110/58 (01-16-25 @ 09:00) (107/51 - 150/60)  BP(mean): 80 (01-16-25 @ 09:00) (73 - 98)  RR: 16 (01-16-25 @ 09:00) (10 - 30)  SpO2: 100% (01-16-25 @ 09:05) (93% - 100%)    Physical Exam:    Constitutional intubated     Neck supple no JVD or LN    Chest Good AE,CTA    CVS  S1 S2     Abd soft BS normal No tenderness     Ext No cyanosis clubbing or edema    IV site no erythema tenderness or discharge    Joints no swelling or LOM    Lab Data:                          8.3    14.10 )-----------( 280      ( 16 Jan 2025 00:41 )             25.9       01-16    135  |  95[L]  |  41[H]  ----------------------------<  122[H]  4.1   |  25  |  3.74[H]    Ca    8.0[L]      16 Jan 2025 00:41  Phos  4.2     01-16  Mg     2.0     01-16    TPro  5.3[L]  /  Alb  2.5[L]  /  TBili  0.7  /  DBili  x   /  AST  23  /  ALT  17  /  AlkPhos  75  01-16      Bronchial  01-11-25   Culture is being performed.  --  --      Bronchial  01-10-25   Commensal manjit consistent with body site  --    No polymorphonuclear leukocytes seen per low power field  No squamous epithelial cells seen per low power field  No organisms seen per oil power field      .Blood BLOOD  01-09-25   No growth at 5 days  --  --        WBC Count: 14.10 (01-16-25 @ 00:41)  WBC Count: 17.07 (01-15-25 @ 00:22)  WBC Count: 21.76 (01-14-25 @ 00:26)  WBC Count: 21.73 (01-13-25 @ 00:26)  WBC Count: 21.67 (01-12-25 @ 00:30)  WBC Count: 29.61 (01-11-25 @ 00:32)  WBC Count: 33.53 (01-10-25 @ 00:59)       Bilirubin Total: 0.7 mg/dL (01-16-25 @ 00:41)  Aspartate Aminotransferase (AST/SGOT): 23 U/L (01-16-25 @ 00:41)  Alanine Aminotransferase (ALT/SGPT): 17 U/L (01-16-25 @ 00:41)  Alkaline Phosphatase: 75 U/L (01-16-25 @ 00:41)    Bilirubin Total: 0.7 mg/dL (01-15-25 @ 00:22)  Aspartate Aminotransferase (AST/SGOT): 31 U/L (01-15-25 @ 00:22)  Alanine Aminotransferase (ALT/SGPT): 23 U/L (01-15-25 @ 00:22)  Alkaline Phosphatase: 88 U/L (01-15-25 @ 00:22)      < from: [facility] Duplex Upper Ext Vein Scan, Right (01.16.25 @ 14:42) >  IMPRESSION:    Right internal jugular vein is not visualized due to technical   limitations. Repeat evaluation can be performed when feasible.    Otherwise, no acute DVT of the right upper extremity is identified.    --- End of Report ---      < end of copied text >  < from: Xray Chest 1 View- PORTABLE-Urgent (Xray Chest 1 View- PORTABLE-Urgent .) (01.16.25 @ 14:08) >  HISTORY: Postop    COMPARISON STUDY: 1/15/2025    Frontal expiratory view of the chest shows the heart to be similarly   enlarged in size. Endotracheal tube, left jugular central line and   feeding tube are present.    The lungs show mild pulmonary congestion with small to moderate left   pleural effusion and there is no evidence of pneumothorax nor right   pleural effusion.    Chest one view 1/16/2025 1:04 PM  Compared to the prior study, tracheostomy tube replaces endotracheal   tube. Right jugular central line reaches the SVC/right atrial junction.   The left lung base is clearer.    IMPRESSION:  Left base clearing. L    < end of copied text >

## 2025-01-16 NOTE — BRIEF OPERATIVE NOTE - OPERATION/FINDINGS
percutaneous tracheostomy placed with bronchoscopy
"  AoXC 92"  C3L free LIMA-LAD; SVG-Diag; SVG-leftPL

## 2025-01-16 NOTE — BRIEF OPERATIVE NOTE - NSICDXBRIEFPROCEDURE_GEN_ALL_CORE_FT
PROCEDURES:  CABG, using 1 arterial and 2 venous grafts 30-Dec-2024 19:13:51  Barbara Johnson  Bronchoscopy, initial, with therapeutic aspiration 12-Jan-2025 12:32:49  Dudley Patino  Planned tracheostomy 16-Jan-2025 13:13:21  Bernie Mcdowell  
PROCEDURES:  CABG, using 1 arterial and 2 venous grafts 30-Dec-2024 19:13:51  Barbara Johnson

## 2025-01-16 NOTE — BRIEF OPERATIVE NOTE - NSICDXBRIEFPOSTOP_GEN_ALL_CORE_FT
POST-OP DIAGNOSIS:  CAD, multiple vessel 30-Dec-2024 19:14:08  Barbara Johnson  Mucus plugging of bronchi 12-Jan-2025 12:26:58  Dudley Patino  Acute respiratory failure with hypoxia 12-Jan-2025 12:27:04  Dudley Patino  Acute hypoxic respiratory failure 12-Jan-2025 12:27:42  Dudley Patino  
POST-OP DIAGNOSIS:  CAD, multiple vessel 30-Dec-2024 19:14:08  Barbara Johnson

## 2025-01-16 NOTE — BRIEF OPERATIVE NOTE - NSICDXBRIEFPREOP_GEN_ALL_CORE_FT
PRE-OP DIAGNOSIS:  CAD, multiple vessel 30-Dec-2024 19:13:59  Barbara Johnson  Mucus plugging of bronchi 12-Jan-2025 12:26:18  Dudley Patino  Acute hypoxic respiratory failure 12-Jan-2025 12:26:48  Dudley Patino  
PRE-OP DIAGNOSIS:  CAD, multiple vessel 30-Dec-2024 19:13:59  Barbara Johnson

## 2025-01-16 NOTE — CHART NOTE - NSCHARTNOTEFT_GEN_A_CORE
Post Operative Note  Patient: ALAINA CANO 54y (1970) Male   MRN: [id number]  [address]  Visit: 12-24-24 Inpatient  Patient Seen and Examined: 01-16-25 @ 18:18    Procedure: S/P perc trach with bronchoscopy     Subjective: Patient seen and examined post operatively. Pain well controlled without visible demonstration of discomfort. Able to utilize trach with current vent settings, no active drainage seen outside of dressing.        Objective:  Vitals: T(F): 99.6 (01-16-25 @ 16:00), Max: 99.6 (01-16-25 @ 04:00)  HR: 67 (01-16-25 @ 17:00)  BP: 138/71 (01-16-25 @ 14:00) (109/51 - 150/60)  RR: 19 (01-16-25 @ 17:00)  SpO2: 100% (01-16-25 @ 17:00)    Physical Examination:  General: NAD, resting comfortably in bed  HEENT: Trach in place, bumper at the skin and connected to ventilator. Prolene suture x2 in place, trach collar in place. No active oozing and dressing nonsaturated.     Imaging:  No post-op imaging studies    Assessment:  55 yo M with multiple medical problems including CAD s/p PCI in April 2024 s/p CABG x 3 on 12/30/24  1/2: Intubated for hypoxia & left lung white out s/p awake bronch with removal of copious mucopurulent secretions  1/4: s/p IABP insertion, sepsis/septic shock due to E coli   1/15: Emergently reintubated.  Now s/p percutaneous tracheostomy 8, placed at bedside. Recovering appropriately.    Plan:  - Trach sutures to remain in place until POD5 1/21  - Continue trach collar  - appreciate care per CTICU     ACS/Trauma Surgery  [phone]

## 2025-01-16 NOTE — CONSULT NOTE ADULT - ASSESSMENT
53 yo M with multiple medical problems including CAD s/p PCI in April 2024 s/p CABG x 3 on 12/30/24  1/2: Intubated for hypoxia & left lung white out s/p awake bronch with removal of copious mucopurulent secretions  1/4: s/p IABP insertion, sepsis/septic shock due to E coli   1/15: Emergently reintubated.    ACS consulted for trach  percutaneous tracheostomy, 8, placed at bedside on 1/16  d/w wife  plan for trach sutures out on 1/20    Trauma Surgery

## 2025-01-16 NOTE — PROGRESS NOTE ADULT - ASSESSMENT
54M with PMH of HTN, HLD, ESRD on HD MWF via LUE AVF, ascites (requiring repeat paracenteses q4-6wks), NICM with moderate LV dysfunction w/ EF 35-40%() and CAD s/p atherectomy + SALLIE to D1(2024) presents to [facility] transferred from [facility]. Patient initially presented to [facility] ED with shortness of breath. Of note he was recently admitted from - for acute cholecystitis s/p cholecystectomy. In [facility] ED, pt was hypoxic to 86% on RA, trop 1.7K, EKG no new changes, CXR fluid overload. Admitted for AHRF 2/2 fluid overload. Pt received HD on , ,  and . DAPT was resumed, TTE showed improvement in LVEF to 40-45%. Stress test was abnormal with 1mm inferior STD, reversible ischemia in the inferior wall and fixed defects in the lateral, anterior and apical walls with post stress EF of 24%. Pt was then transferred to [facility] for cardiac cath which showed pLAD 70% ISR, mLCx 70%, D1 severe dz. Patient now transferred to [facility] CTS Dr. Rivers for CABG eval. Patient denies any current SOB or chest pain on admission. Otherwise denies headaches, abdominal pain, urinary or bowel changes, fevers, chills, N/V/D, sick contacts.  (24 Dec 2024 05:07)   VSS  CP free   HD via avf  for daily HD pre op- on lovenox 30 qd    HD today continue with present meds. Plan for CABG possibleTVR next week   vss; rsr 55-80; hd today w/ 1 unit prbc; pt to be dialzyed also this  w/ another 1 unit prbc   VSS; RSR 60-80; continue asa/bb/ statin; HD in am - transfuse 1 unit prbc with HD; d/c lovenox after am dose sun    - Pt underwent  C3L free LIMA-LAD; SVG-Diag; SVG-leftPL  Pt given cefuroxime perioperatively   pt extubated   . pt with hypotension and ECHO showing Systolic HF/depressed BIV Function, currently supported with /pressors.  Labs this evening showing transaminitis  1/2 -3 pt hypotensive, febrile and reintubated  Pt pancultured. and started on zosyn- meropenem and gent  pt found to have Gram negative bacteremia    E coli +ESBL bacteremia        A/P  #sepsis  pt with decompensation yesterday , reintubated  could pt have tracheomalacia ?  Pt was last bacteremic 1/2  paracentesis was bland   Continue meropenem- day # 13 and inhaled tobramycin  check bladder scan- to make sure no urinary source- not distended on CT   U/S of graft  with stenosis but normal function.  await repeat sputum cultures   WBC slowly improving       #Renal failure  dose meds for renal failure         Marla Champion M.D. ,   please reach via teams   If no answer, or after 5PM/ weekends,  then please call  [phone]    Assessment and plan discussed with the primary team .

## 2025-01-16 NOTE — PROCEDURE NOTE - NSBRONCHPROCDETAILS_GEN_A_CORE_FT
Performed bronchoscopy through the ETT. There was thick secretions in the left lower lobe. This was extensively irrigated and cleaned. The airway was erythematous. The right main and rest of the bronchi were clean.

## 2025-01-16 NOTE — PROGRESS NOTE ADULT - SUBJECTIVE AND OBJECTIVE BOX
Pulmonary Consult Follow up     Interval Events:    Doing well today. Planned for tracheostomy.    REVIEW OF SYSTEMS:  [x] All other systems negative except per HPI   [ ] Unable to assess ROS because ________    OBJECTIVE:  ICU Vital Signs Last 24 Hrs  T(C): 37.3 (16 Jan 2025 12:00), Max: 37.6 (16 Jan 2025 04:00)  T(F): 99.2 (16 Jan 2025 12:00), Max: 99.6 (16 Jan 2025 04:00)  HR: 68 (16 Jan 2025 14:31) (68 - 86)  BP: 138/71 (16 Jan 2025 14:00) (109/51 - 150/60)  BP(mean): 99 (16 Jan 2025 14:00) (74 - 99)  ABP: 184/48 (16 Jan 2025 14:00) (132/43 - 206/47)  ABP(mean): 77 (16 Jan 2025 14:00) (55 - 81)  RR: 23 (16 Jan 2025 14:00) (10 - 30)  SpO2: 100% (16 Jan 2025 14:31) (98% - 100%)    O2 Parameters below as of 16 Jan 2025 11:13  Patient On (Oxygen Delivery Method): ventilator          Mode: AC/ CMV (Assist Control/ Continuous Mandatory Ventilation), RR (machine): 22, FiO2: 40, PEEP: 10, ITime: 1, MAP: 14, PC: 15, PIP: 25    01-15 @ 07:01  -  01-16 @ 07:00  --------------------------------------------------------  IN: 1555 mL / OUT: 1800 mL / NET: -245 mL    01-16 @ 07:01  -  01-16 @ 15:17  --------------------------------------------------------  IN: 244.5 mL / OUT: 0 mL / NET: 244.5 mL        PHYSICAL EXAM:  GENERAL: NAD   HEAD:  Atraumatic, Normocephalic  EYES: EOMI, conjunctiva and sclera clear  ENMT: Endotracheal tube in place.  CHEST/LUNG: ventilating well on ventilator.   HEART: Regular rate and rhythm; No murmurs, rubs, or gallops  ABDOMEN: Nondistended  VASCULAR: No cyanosis, or edema  SKIN: No rashes or lesions  NERVOUS SYSTEM: Rass -2 on ventilator    HOSPITAL MEDICATIONS:  MEDICATIONS  (STANDING):  acetylcysteine 20%  Inhalation 4 milliLiter(s) Inhalation every 6 hours  albuterol/ipratropium for Nebulization 3 milliLiter(s) Nebulizer every 6 hours  aMIOdarone    Tablet   Oral   aMIOdarone    Tablet 200 milliGRAM(s) Oral daily  ascorbic acid 500 milliGRAM(s) Oral daily  aspirin  chewable 81 milliGRAM(s) Oral daily  atorvastatin 80 milliGRAM(s) Oral at bedtime  bisacodyl Suppository 10 milliGRAM(s) Rectal once  chlorhexidine 0.12% Liquid 15 milliLiter(s) Oral Mucosa every 12 hours  chlorhexidine 2% Cloths 1 Application(s) Topical daily  chlorhexidine 4% Liquid 1 Application(s) Topical <User Schedule>  dexMEDEtomidine Infusion 0.5 MICROgram(s)/kG/Hr (10.6 mL/Hr) IV Continuous <Continuous>  dextrose 50% Injectable 50 milliLiter(s) IV Push every 15 minutes  dextrose 50% Injectable 25 milliLiter(s) IV Push every 15 minutes  DOBUTamine Infusion 2 MICROgram(s)/kG/Min (5.11 mL/Hr) IV Continuous <Continuous>  epoetin ignacia (EPOGEN) Injectable 35984 Unit(s) IV Push <User Schedule>  fentaNYL    Injectable 50 MICROGram(s) IV Push once  gabapentin 100 milliGRAM(s) Oral every 12 hours  hydrALAZINE 10 milliGRAM(s) Oral every 8 hours  insulin lispro (ADMELOG) corrective regimen sliding scale   SubCutaneous every 6 hours  insulin regular Infusion 2 Unit(s)/Hr (2 mL/Hr) IV Continuous <Continuous>  meropenem  IVPB 500 milliGRAM(s) IV Intermittent every 24 hours  Nephro-elicia 1 Tablet(s) Oral daily  pantoprazole  Injectable 40 milliGRAM(s) IV Push daily  propofol Infusion 30 MICROgram(s)/kG/Min (15.3 mL/Hr) IV Continuous <Continuous>  propofol Injectable 40 milliGRAM(s) IV Push once  sodium chloride 0.65% Nasal 1 Spray(s) Both Nostrils every 12 hours  sodium chloride 0.9%. 1000 milliLiter(s) (10 mL/Hr) IV Continuous <Continuous>  tobramycin for Nebulization 300 milliGRAM(s) Inhalation every 12 hours  traZODone 100 milliGRAM(s) Oral at bedtime  vancomycin    Solution 125 milliGRAM(s) Oral every 12 hours    MEDICATIONS  (PRN):  benzocaine 20% Spray 1 Spray(s) Topical every 6 hours PRN throat pain  lidocaine/prilocaine Cream 1 Application(s) Topical daily PRN pre-HD  sodium chloride 0.9% lock flush 10 milliLiter(s) IV Push every 1 hour PRN Pre/post blood products, medications, blood draw, and to maintain line patency      LABS:  01-16    135  |  95[L]  |  41[H]  ----------------------------<  122[H]  4.1   |  25  |  3.74[H]  01-15    135  |  97  |  66[H]  ----------------------------<  192[H]  4.8   |  23  |  4.91[H]  01-14    136  |  97  |  46[H]  ----------------------------<  159[H]  4.0   |  24  |  3.81[H]    Ca    8.0[L]      16 Jan 2025 00:41  Ca    8.3[L]      15 Aquilino 2025 00:22  Ca    8.8      14 Jan 2025 00:26  Phos  4.2     01-16  Mg     2.0     01-16    TPro  5.3[L]  /  Alb  2.5[L]  /  TBili  0.7  /  DBili  x   /  AST  23  /  ALT  17  /  AlkPhos  75  01-16  TPro  5.8[L]  /  Alb  2.9[L]  /  TBili  0.7  /  DBili  x   /  AST  31  /  ALT  23  /  AlkPhos  88  01-15  TPro  6.1  /  Alb  3.1[L]  /  TBili  0.7  /  DBili  x   /  AST  21  /  ALT  26  /  AlkPhos  85  01-14    Magnesium: 2.0 mg/dL (01-16-25 @ 00:41)  Magnesium: 2.1 mg/dL (01-15-25 @ 00:22)  Magnesium: 2.2 mg/dL (01-14-25 @ 00:26)    Phosphorus: 4.2 mg/dL (01-16-25 @ 00:41)  Phosphorus: 4.4 mg/dL (01-15-25 @ 00:22)  Phosphorus: 4.0 mg/dL (01-14-25 @ 00:26)      PT/INR - ( 16 Jan 2025 00:51 )   PT: 17.2 sec;   INR: 1.50 ratio         PTT - ( 16 Jan 2025 00:51 )  PTT:33.6 sec              Urinalysis Basic - ( 16 Jan 2025 00:41 )    Color: x / Appearance: x / SG: x / pH: x  Gluc: 122 mg/dL / Ketone: x  / Bili: x / Urobili: x   Blood: x / Protein: x / Nitrite: x   Leuk Esterase: x / RBC: x / WBC x   Sq Epi: x / Non Sq Epi: x / Bacteria: x      ABG - ( 16 Jan 2025 12:45 )  pH, Arterial: 7.51  pH, Blood: x     /  pCO2: 34    /  pO2: 267   / HCO3: 27    / Base Excess: 4.1   /  SaO2: 100.0                                   8.3    14.10 )-----------( 280      ( 16 Jan 2025 00:41 )             25.9                         9.4    17.07 )-----------( 329      ( 15 Aquilino 2025 00:22 )             29.4                         9.5    21.76 )-----------( 292      ( 14 Jan 2025 00:26 )             29.4     CAPILLARY BLOOD GLUCOSE  126 (16 Jan 2025 00:00)  136 (15 Aquilino 2025 21:00)  117 (15 Aquilino 2025 19:00)      POCT Blood Glucose.: 171 mg/dL (16 Jan 2025 12:39)  POCT Blood Glucose.: 149 mg/dL (16 Jan 2025 05:24)  POCT Blood Glucose.: 126 mg/dL (16 Jan 2025 00:05)  POCT Blood Glucose.: 136 mg/dL (15 Aquilino 2025 21:13)  POCT Blood Glucose.: 117 mg/dL (15 Aquilino 2025 18:48)  POCT Blood Glucose.: 97 mg/dL (15 Aquilino 2025 17:30)  POCT Blood Glucose.: 91 mg/dL (15 Aquilino 2025 17:10)  POCT Blood Glucose.: 93 mg/dL (15 Aquilino 2025 16:16)    Blood Gas Source Venous: Venous (01-16-25 @ 12:45)  Blood Gas Source Venous: Venous (01-15-25 @ 15:00)  Blood Gas Source Venous: Venous (01-15-25 @ 00:14)

## 2025-01-16 NOTE — CONSULT NOTE ADULT - SUBJECTIVE AND OBJECTIVE BOX
SURGERY CONSULT NOTE    HPI:  54M with PMH of HTN, HLD, ESRD on HD MWF via LUE AVF, ascites (requiring repeat paracenteses q4-6wks), NICM with moderate LV dysfunction w/ EF 35-40%(2023) and CAD s/p atherectomy + SALLIE to D1(4/11/2024) presents to [facility] transferred from [facility]. Patient initially presented to [facility] ED with shortness of breath. Of note he was recently admitted from 09/27-12/02 for acute cholecystitis s/p cholecystectomy. In [facility] ED, pt was hypoxic to 86% on RA, trop 1.7K, EKG no new changes, CXR fluid overload. Admitted for AHRF 2/2 fluid overload. Pt received HD on 12/18, 12/19, 12/20 and 12/22. DAPT was resumed, TTE showed improvement in LVEF to 40-45%. Stress test was abnormal with 1mm inferior STD, reversible ischemia in the inferior wall and fixed defects in the lateral, anterior and apical walls with post stress EF of 24%. Pt was then transferred to [facility] for cardiac cath which showed pLAD 70% ISR, mLCx 70%, D1 severe dz. Patient now transferred to [facility] CTS Dr. Rivers for CABG eval. Patient denies any current SOB or chest pain on admission. Otherwise denies headaches, abdominal pain, urinary or bowel changes, fevers, chills, N/V/D, sick contacts.  (24 Dec 2024 05:07)    Trauma surgery consulted for trach placement.     PAST MEDICAL & SURGICAL HISTORY:  Type 2 diabetes mellitus      Hypertension      End stage renal disease  started HD 2/2019 T, Th, Sat via right chest permacath      Bone spur  right shoulder- hx of - sx done      Anemia      Injury of right wrist  hx of at age 14      History of hyperkalemia  before HD- K-6.2 - to repeat in am of sx, pt had HD today      S/P arthroscopy of right shoulder  2010      History of vascular access device  right chest permacath - 2/2019      H/O right wrist surgery  tendons repair - at age 14      AV fistula      H/O ventral hernia repair      S/P cholecystectomy          MEDICATIONS  (STANDING):  acetylcysteine 20%  Inhalation 4 milliLiter(s) Inhalation every 6 hours  albuterol/ipratropium for Nebulization 3 milliLiter(s) Nebulizer every 6 hours  aMIOdarone    Tablet   Oral   aMIOdarone    Tablet 200 milliGRAM(s) Oral daily  ascorbic acid 500 milliGRAM(s) Oral daily  aspirin  chewable 81 milliGRAM(s) Oral daily  atorvastatin 80 milliGRAM(s) Oral at bedtime  bisacodyl Suppository 10 milliGRAM(s) Rectal once  chlorhexidine 0.12% Liquid 15 milliLiter(s) Oral Mucosa every 12 hours  chlorhexidine 2% Cloths 1 Application(s) Topical daily  chlorhexidine 4% Liquid 1 Application(s) Topical <User Schedule>  dexMEDEtomidine Infusion 0.5 MICROgram(s)/kG/Hr (10.6 mL/Hr) IV Continuous <Continuous>  dextrose 50% Injectable 50 milliLiter(s) IV Push every 15 minutes  dextrose 50% Injectable 25 milliLiter(s) IV Push every 15 minutes  DOBUTamine Infusion 2 MICROgram(s)/kG/Min (5.11 mL/Hr) IV Continuous <Continuous>  epoetin ignacia (EPOGEN) Injectable 74705 Unit(s) IV Push <User Schedule>  fentaNYL    Injectable 50 MICROGram(s) IV Push once  gabapentin 100 milliGRAM(s) Oral every 12 hours  hydrALAZINE 10 milliGRAM(s) Oral every 8 hours  insulin lispro (ADMELOG) corrective regimen sliding scale   SubCutaneous every 6 hours  insulin regular Infusion 2 Unit(s)/Hr (2 mL/Hr) IV Continuous <Continuous>  meropenem  IVPB 500 milliGRAM(s) IV Intermittent every 24 hours  Nephro-elicia 1 Tablet(s) Oral daily  pantoprazole  Injectable 40 milliGRAM(s) IV Push daily  propofol Infusion 30 MICROgram(s)/kG/Min (15.3 mL/Hr) IV Continuous <Continuous>  propofol Injectable 40 milliGRAM(s) IV Push once  rocuronium Injectable 100 milliGRAM(s) IV Push once  sodium chloride 0.65% Nasal 1 Spray(s) Both Nostrils every 12 hours  sodium chloride 0.9%. 1000 milliLiter(s) (10 mL/Hr) IV Continuous <Continuous>  tobramycin for Nebulization 300 milliGRAM(s) Inhalation every 12 hours  traZODone 100 milliGRAM(s) Oral at bedtime  vancomycin    Solution 125 milliGRAM(s) Oral every 12 hours    MEDICATIONS  (PRN):  benzocaine 20% Spray 1 Spray(s) Topical every 6 hours PRN throat pain  lidocaine/prilocaine Cream 1 Application(s) Topical daily PRN pre-HD  sodium chloride 0.9% lock flush 10 milliLiter(s) IV Push every 1 hour PRN Pre/post blood products, medications, blood draw, and to maintain line patency      Allergies    No Known Allergies    Intolerances        SOCIAL HISTORY:    FAMILY HISTORY:  FH: hypertension  mother, father- alive    FH: type 2 diabetes  mother, father- alive        Physical Exam:  General: NAD, resting comfortably  HEENT: NC/AT, EOMI, normal hearing, no oral lesions, no LAD, neck supple  Pulmonary: normal resp effort, CTA-B  Cardiovascular: NSR, no murmurs  Abdominal: soft, ND/NT, no organomegaly  Extremities: WWP, normal strength, no clubbing/cyanosis/edema  Neuro: A/O x 3, CNs II-XII grossly intact, normal sensation, no focal deficits  Pulses: palpable distal pulses    Vital Signs Last 24 Hrs  T(C): 37.3 (16 Jan 2025 12:00), Max: 37.6 (16 Jan 2025 04:00)  T(F): 99.2 (16 Jan 2025 12:00), Max: 99.6 (16 Jan 2025 04:00)  HR: 69 (16 Jan 2025 13:00) (65 - 86)  BP: 135/63 (16 Jan 2025 12:00) (109/51 - 150/60)  BP(mean): 91 (16 Jan 2025 12:00) (74 - 96)  RR: 24 (16 Jan 2025 13:00) (10 - 30)  SpO2: 100% (16 Jan 2025 13:00) (98% - 100%)    Parameters below as of 16 Jan 2025 11:13  Patient On (Oxygen Delivery Method): ventilator        I&O's Summary    15 Aquilino 2025 07:01  -  16 Jan 2025 07:00  --------------------------------------------------------  IN: 1555 mL / OUT: 1800 mL / NET: -245 mL    16 Jan 2025 07:01  -  16 Jan 2025 13:08  --------------------------------------------------------  IN: 202.5 mL / OUT: 0 mL / NET: 202.5 mL            LABS:                        8.3    14.10 )-----------( 280      ( 16 Jan 2025 00:41 )             25.9     01-16    135  |  95[L]  |  41[H]  ----------------------------<  122[H]  4.1   |  25  |  3.74[H]    Ca    8.0[L]      16 Jan 2025 00:41  Phos  4.2     01-16  Mg     2.0     01-16    TPro  5.3[L]  /  Alb  2.5[L]  /  TBili  0.7  /  DBili  x   /  AST  23  /  ALT  17  /  AlkPhos  75  01-16    PT/INR - ( 16 Jan 2025 00:51 )   PT: 17.2 sec;   INR: 1.50 ratio         PTT - ( 16 Jan 2025 00:51 )  PTT:33.6 sec  Urinalysis Basic - ( 16 Jan 2025 00:41 )    Color: x / Appearance: x / SG: x / pH: x  Gluc: 122 mg/dL / Ketone: x  / Bili: x / Urobili: x   Blood: x / Protein: x / Nitrite: x   Leuk Esterase: x / RBC: x / WBC x   Sq Epi: x / Non Sq Epi: x / Bacteria: x      CAPILLARY BLOOD GLUCOSE  126 (16 Jan 2025 00:00)  136 (15 Aquilino 2025 21:00)  117 (15 Aquilino 2025 19:00)      POCT Blood Glucose.: 171 mg/dL (16 Jan 2025 12:39)  POCT Blood Glucose.: 149 mg/dL (16 Jan 2025 05:24)  POCT Blood Glucose.: 126 mg/dL (16 Jan 2025 00:05)  POCT Blood Glucose.: 136 mg/dL (15 Aquilino 2025 21:13)  POCT Blood Glucose.: 117 mg/dL (15 Aquilino 2025 18:48)  POCT Blood Glucose.: 97 mg/dL (15 Aquilino 2025 17:30)  POCT Blood Glucose.: 91 mg/dL (15 Aquilino 2025 17:10)  POCT Blood Glucose.: 93 mg/dL (15 Aquilino 2025 16:16)  POCT Blood Glucose.: 174 mg/dL (15 Aquilino 2025 14:55)    LIVER FUNCTIONS - ( 16 Jan 2025 00:41 )  Alb: 2.5 g/dL / Pro: 5.3 g/dL / ALK PHOS: 75 U/L / ALT: 17 U/L / AST: 23 U/L / GGT: x             Cultures:      RADIOLOGY & ADDITIONAL STUDIES:      Plan:          SURGERY CONSULT NOTE    HPI:    HPI:  54M with PMH of HTN, HLD, ESRD on HD MWF via LUE AVF, ascites (requiring repeat paracenteses q4-6wks), NICM with moderate LV dysfunction w/ EF 35-40%(2023) and CAD s/p atherectomy + SALLIE to D1(4/11/2024) presents to [facility] transferred from [facility]. Patient initially presented to [facility] ED with shortness of breath. Of note he was recently admitted from 09/27-12/02 for acute cholecystitis s/p cholecystectomy. In [facility] ED, pt was hypoxic to 86% on RA, trop 1.7K, EKG no new changes, CXR fluid overload. Admitted for AHRF 2/2 fluid overload. Pt received HD on 12/18, 12/19, 12/20 and 12/22. DAPT was resumed, TTE showed improvement in LVEF to 40-45%. Stress test was abnormal with 1mm inferior STD, reversible ischemia in the inferior wall and fixed defects in the lateral, anterior and apical walls with post stress EF of 24%. Pt was then transferred to [facility] for cardiac cath which showed pLAD 70% ISR, mLCx 70%, D1 severe dz. Patient now transferred to [facility] CTS Dr. Rivers for CABG eval. Patient denies any current SOB or chest pain on admission. Otherwise denies headaches, abdominal pain, urinary or bowel changes, fevers, chills, N/V/D, sick contacts.  (24 Dec 2024 05:07)      PAST MEDICAL & SURGICAL HISTORY:  Type 2 diabetes mellitus      Hypertension      End stage renal disease  started HD 2/2019 T, Th, Sat via right chest permacath      Bone spur  right shoulder- hx of - sx done      Anemia      Injury of right wrist  hx of at age 14      History of hyperkalemia  before HD- K-6.2 - to repeat in am of sx, pt had HD today      S/P arthroscopy of right shoulder  2010      History of vascular access device  right chest permacath - 2/2019      H/O right wrist surgery  tendons repair - at age 14      AV fistula      H/O ventral hernia repair      S/P cholecystectomy          MEDICATIONS  (STANDING):  acetylcysteine 20%  Inhalation 4 milliLiter(s) Inhalation every 6 hours  albuterol/ipratropium for Nebulization 3 milliLiter(s) Nebulizer every 6 hours  aMIOdarone    Tablet   Oral   aMIOdarone    Tablet 200 milliGRAM(s) Oral daily  ascorbic acid 500 milliGRAM(s) Oral daily  aspirin  chewable 81 milliGRAM(s) Oral daily  atorvastatin 80 milliGRAM(s) Oral at bedtime  bisacodyl Suppository 10 milliGRAM(s) Rectal once  chlorhexidine 0.12% Liquid 15 milliLiter(s) Oral Mucosa every 12 hours  chlorhexidine 2% Cloths 1 Application(s) Topical daily  chlorhexidine 4% Liquid 1 Application(s) Topical <User Schedule>  dexMEDEtomidine Infusion 0.5 MICROgram(s)/kG/Hr (10.6 mL/Hr) IV Continuous <Continuous>  dextrose 50% Injectable 50 milliLiter(s) IV Push every 15 minutes  dextrose 50% Injectable 25 milliLiter(s) IV Push every 15 minutes  DOBUTamine Infusion 2 MICROgram(s)/kG/Min (5.11 mL/Hr) IV Continuous <Continuous>  epoetin ignacia (EPOGEN) Injectable 14864 Unit(s) IV Push <User Schedule>  fentaNYL    Injectable 50 MICROGram(s) IV Push once  gabapentin 100 milliGRAM(s) Oral every 12 hours  hydrALAZINE 10 milliGRAM(s) Oral every 8 hours  insulin lispro (ADMELOG) corrective regimen sliding scale   SubCutaneous every 6 hours  insulin regular Infusion 2 Unit(s)/Hr (2 mL/Hr) IV Continuous <Continuous>  meropenem  IVPB 500 milliGRAM(s) IV Intermittent every 24 hours  Nephro-elicia 1 Tablet(s) Oral daily  pantoprazole  Injectable 40 milliGRAM(s) IV Push daily  propofol Infusion 30 MICROgram(s)/kG/Min (15.3 mL/Hr) IV Continuous <Continuous>  propofol Injectable 40 milliGRAM(s) IV Push once  rocuronium Injectable 100 milliGRAM(s) IV Push once  sodium chloride 0.65% Nasal 1 Spray(s) Both Nostrils every 12 hours  sodium chloride 0.9%. 1000 milliLiter(s) (10 mL/Hr) IV Continuous <Continuous>  tobramycin for Nebulization 300 milliGRAM(s) Inhalation every 12 hours  traZODone 100 milliGRAM(s) Oral at bedtime  vancomycin    Solution 125 milliGRAM(s) Oral every 12 hours    MEDICATIONS  (PRN):  benzocaine 20% Spray 1 Spray(s) Topical every 6 hours PRN throat pain  lidocaine/prilocaine Cream 1 Application(s) Topical daily PRN pre-HD  sodium chloride 0.9% lock flush 10 milliLiter(s) IV Push every 1 hour PRN Pre/post blood products, medications, blood draw, and to maintain line patency      Allergies    No Known Allergies    Intolerances        SOCIAL HISTORY:    FAMILY HISTORY:  FH: hypertension  mother, father- alive    FH: type 2 diabetes  mother, father- alive        Physical Exam:  General: NAD  Pulmonary: intuabted on vent  Cardiovascular: NSR, no murmurs  Abdominal: soft, ND/NT, no organomegaly    Vital Signs Last 24 Hrs  T(C): 37.3 (16 Jan 2025 12:00), Max: 37.6 (16 Jan 2025 04:00)  T(F): 99.2 (16 Jan 2025 12:00), Max: 99.6 (16 Jan 2025 04:00)  HR: 69 (16 Jan 2025 13:00) (65 - 86)  BP: 135/63 (16 Jan 2025 12:00) (109/51 - 150/60)  BP(mean): 91 (16 Jan 2025 12:00) (74 - 96)  RR: 24 (16 Jan 2025 13:00) (10 - 30)  SpO2: 100% (16 Jan 2025 13:00) (98% - 100%)    Parameters below as of 16 Jan 2025 11:13  Patient On (Oxygen Delivery Method): ventilator        I&O's Summary    15 Aquilino 2025 07:01  -  16 Jan 2025 07:00  --------------------------------------------------------  IN: 1555 mL / OUT: 1800 mL / NET: -245 mL    16 Jan 2025 07:01  -  16 Jan 2025 13:09  --------------------------------------------------------  IN: 202.5 mL / OUT: 0 mL / NET: 202.5 mL            LABS:                        8.3    14.10 )-----------( 280      ( 16 Jan 2025 00:41 )             25.9     01-16    135  |  95[L]  |  41[H]  ----------------------------<  122[H]  4.1   |  25  |  3.74[H]    Ca    8.0[L]      16 Jan 2025 00:41  Phos  4.2     01-16  Mg     2.0     01-16    TPro  5.3[L]  /  Alb  2.5[L]  /  TBili  0.7  /  DBili  x   /  AST  23  /  ALT  17  /  AlkPhos  75  01-16    PT/INR - ( 16 Jan 2025 00:51 )   PT: 17.2 sec;   INR: 1.50 ratio         PTT - ( 16 Jan 2025 00:51 )  PTT:33.6 sec  Urinalysis Basic - ( 16 Jan 2025 00:41 )    Color: x / Appearance: x / SG: x / pH: x  Gluc: 122 mg/dL / Ketone: x  / Bili: x / Urobili: x   Blood: x / Protein: x / Nitrite: x   Leuk Esterase: x / RBC: x / WBC x   Sq Epi: x / Non Sq Epi: x / Bacteria: x      CAPILLARY BLOOD GLUCOSE  126 (16 Jan 2025 00:00)  136 (15 Aquilino 2025 21:00)  117 (15 Aquilino 2025 19:00)      POCT Blood Glucose.: 171 mg/dL (16 Jan 2025 12:39)  POCT Blood Glucose.: 149 mg/dL (16 Jan 2025 05:24)  POCT Blood Glucose.: 126 mg/dL (16 Jan 2025 00:05)  POCT Blood Glucose.: 136 mg/dL (15 Aquilino 2025 21:13)  POCT Blood Glucose.: 117 mg/dL (15 Aquilino 2025 18:48)  POCT Blood Glucose.: 97 mg/dL (15 Aquilino 2025 17:30)  POCT Blood Glucose.: 91 mg/dL (15 Aquilino 2025 17:10)  POCT Blood Glucose.: 93 mg/dL (15 Aquilino 2025 16:16)  POCT Blood Glucose.: 174 mg/dL (15 Aquilino 2025 14:55)    LIVER FUNCTIONS - ( 16 Jan 2025 00:41 )  Alb: 2.5 g/dL / Pro: 5.3 g/dL / ALK PHOS: 75 U/L / ALT: 17 U/L / AST: 23 U/L / GGT: x             Cultures:      RADIOLOGY & ADDITIONAL STUDIES:      Plan:

## 2025-01-16 NOTE — PROGRESS NOTE ADULT - SUBJECTIVE AND OBJECTIVE BOX
PATIENT SEEN AND EXAMINED BY JEAN PAUL GABRIEL M.D. ON :- 1/16/25  DATE OF SERVICE:       1/16/25      Interim events noted,Labs ,Radiological studies and Cardiology tests reviewed .    Patient is a 54y old  Male who presents with a chief complaint of CAD s/p CABG (14 Jan 2025 07:05)      HPI:  54M with PMH of HTN, HLD, ESRD on HD MWF via LUE AVF, ascites (requiring repeat paracenteses q4-6wks), NICM with moderate LV dysfunction w/ EF 35-40%(2023) and CAD s/p atherectomy + SALLIE to D1(4/11/2024) presents to [facility] transferred from [facility]. Patient initially presented to [facility] ED with shortness of breath. Of note he was recently admitted from 09/27-12/02 for acute cholecystitis s/p cholecystectomy. In [facility] ED, pt was hypoxic to 86% on RA, trop 1.7K, EKG no new changes, CXR fluid overload. Admitted for AHRF 2/2 fluid overload. Pt received HD on 12/18, 12/19, 12/20 and 12/22. DAPT was resumed, TTE showed improvement in LVEF to 40-45%. Stress test was abnormal with 1mm inferior STD, reversible ischemia in the inferior wall and fixed defects in the lateral, anterior and apical walls with post stress EF of 24%. Pt was then transferred to [facility] for cardiac cath which showed pLAD 70% ISR, mLCx 70%, D1 severe dz. Patient now transferred to [facility] CTS Dr. Rivers for CABG eval. Patient denies any current SOB or chest pain on admission. Otherwise denies headaches, abdominal pain, urinary or bowel changes, fevers, chills, N/V/D, sick contacts.  (24 Dec 2024 05:07)      PAST MEDICAL & SURGICAL HISTORY:  Type 2 diabetes mellitus      Hypertension      End stage renal disease  started HD 2/2019 T, Th, Sat via right chest permacath      Bone spur  right shoulder- hx of - sx done      Anemia      Injury of right wrist  hx of at age 14      History of hyperkalemia  before HD- K-6.2 - to repeat in am of sx, pt had HD today      S/P arthroscopy of right shoulder  2010      History of vascular access device  right chest permacath - 2/2019      H/O right wrist surgery  tendons repair - at age 14      AV fistula      H/O ventral hernia repair      S/P cholecystectomy          PREVIOUS DIAGNOSTIC TESTING:      ECHO  FINDINGS:    STRESS  FINDINGS:    CATHETERIZATION  FINDINGS:    MEDICATIONS  (STANDING):  acetylcysteine 20%  Inhalation 4 milliLiter(s) Inhalation every 6 hours  albuterol/ipratropium for Nebulization 3 milliLiter(s) Nebulizer every 6 hours  aMIOdarone    Tablet   Oral   aMIOdarone    Tablet 200 milliGRAM(s) Oral daily  ascorbic acid 500 milliGRAM(s) Oral daily  aspirin  chewable 81 milliGRAM(s) Oral daily  atorvastatin 80 milliGRAM(s) Oral at bedtime  bisacodyl Suppository 10 milliGRAM(s) Rectal once  chlorhexidine 0.12% Liquid 15 milliLiter(s) Oral Mucosa every 12 hours  chlorhexidine 2% Cloths 1 Application(s) Topical daily  chlorhexidine 4% Liquid 1 Application(s) Topical <User Schedule>  dexMEDEtomidine Infusion 0.5 MICROgram(s)/kG/Hr (10.6 mL/Hr) IV Continuous <Continuous>  dextrose 50% Injectable 50 milliLiter(s) IV Push every 15 minutes  dextrose 50% Injectable 25 milliLiter(s) IV Push every 15 minutes  DOBUTamine Infusion 2 MICROgram(s)/kG/Min (5.11 mL/Hr) IV Continuous <Continuous>  epoetin ignacia (EPOGEN) Injectable 38490 Unit(s) IV Push <User Schedule>  gabapentin 100 milliGRAM(s) Oral every 12 hours  hydrALAZINE 10 milliGRAM(s) Oral every 8 hours  insulin lispro (ADMELOG) corrective regimen sliding scale   SubCutaneous every 6 hours  insulin regular Infusion 2 Unit(s)/Hr (2 mL/Hr) IV Continuous <Continuous>  meropenem  IVPB 500 milliGRAM(s) IV Intermittent every 24 hours  Nephro-elicia 1 Tablet(s) Oral daily  pantoprazole  Injectable 40 milliGRAM(s) IV Push daily  propofol Infusion 30 MICROgram(s)/kG/Min (15.3 mL/Hr) IV Continuous <Continuous>  propofol Injectable 40 milliGRAM(s) IV Push once  sodium chloride 0.65% Nasal 1 Spray(s) Both Nostrils every 12 hours  sodium chloride 0.9%. 1000 milliLiter(s) (10 mL/Hr) IV Continuous <Continuous>  tobramycin for Nebulization 300 milliGRAM(s) Inhalation every 12 hours  traZODone 100 milliGRAM(s) Oral at bedtime  vancomycin    Solution 125 milliGRAM(s) Oral every 12 hours    MEDICATIONS  (PRN):  benzocaine 20% Spray 1 Spray(s) Topical every 6 hours PRN throat pain  lidocaine/prilocaine Cream 1 Application(s) Topical daily PRN pre-HD  sodium chloride 0.9% lock flush 10 milliLiter(s) IV Push every 1 hour PRN Pre/post blood products, medications, blood draw, and to maintain line patency      FAMILY HISTORY:  FH: hypertension  mother, father- alive    FH: type 2 diabetes  mother, father- alive        SOCIAL HISTORY:    CIGARETTES:    ALCOHOL:    REVIEW OF SYSTEMS:  CONSTITUTIONAL: No fever, weight loss, or fatigue  EYES: No eye pain, visual disturbances, or discharge  ENMT:  No difficulty hearing, tinnitus, vertigo; No sinus or throat pain  NECK: No pain or stiffness  RESPIRATORY: No cough, wheezing, chills or hemoptysis; No shortness of breath  CARDIOVASCULAR: No chest pain, palpitations, dizziness, or leg swelling  GASTROINTESTINAL: No abdominal or epigastric pain. No nausea, vomiting, or hematemesis; No diarrhea or constipation. No melena or hematochezia.  GENITOURINARY: No dysuria, frequency, hematuria, or incontinence  NEUROLOGICAL: No headaches, memory loss, loss of strength, numbness, or tremors  SKIN: No itching, burning, rashes, or lesions   LYMPH NODES: No enlarged glands  ENDOCRINE: No heat or cold intolerance; No hair loss  MUSCULOSKELETAL: No joint pain or swelling; No muscle, back, or extremity pain  PSYCHIATRIC: No depression, anxiety, mood swings, or difficulty sleeping  HEME/LYMPH: No easy bruising, or bleeding gums  ALLERY AND IMMUNOLOGIC: No hives or eczema    Vital Signs Last 24 Hrs  T(C): 37.3 (16 Jan 2025 20:00), Max: 37.6 (16 Jan 2025 04:00)  T(F): 99.1 (16 Jan 2025 20:00), Max: 99.6 (16 Jan 2025 04:00)  HR: 73 (16 Jan 2025 23:19) (66 - 86)  BP: 136/69 (16 Jan 2025 21:00) (109/51 - 138/71)  BP(mean): 96 (16 Jan 2025 21:00) (74 - 99)  RR: 27 (16 Jan 2025 21:00) (10 - 27)  SpO2: 100% (16 Jan 2025 23:19) (97% - 100%)    Parameters below as of 16 Jan 2025 23:19  Patient On (Oxygen Delivery Method): ventilator          PHYSICAL EXAM:  GENERAL: NAD, well-groomed, well-developed  HEAD:  Atraumatic, Normocephalic  EYES: EOMI, PERRLA, conjunctiva and sclera clear  ENMT: No tonsillar erythema, exudates, or enlargement; Moist mucous membranes, Good dentition, No lesions  NECK: Supple, No JVD, Normal thyroid  NERVOUS SYSTEM:  Alert & Oriented X3, Good concentration; Motor Strength 5/5 B/L upper and lower extremities; DTRs 2+ intact and symmetric  CHEST/LUNG: Clear to percussion bilaterally; No rales, rhonchi, wheezing, or rubs  HEART: Regular rate and rhythm; No murmurs, rubs, or gallops  ABDOMEN: Soft, Nontender, Nondistended; Bowel sounds present  EXTREMITIES:  2+ Peripheral Pulses, No clubbing, cyanosis, or edema  LYMPH: No lymphadenopathy noted  SKIN: No rashes or lesions      INTERPRETATION OF TELEMETRY:    ECG:    CHADSVASC:     LABS:                        8.3    14.10 )-----------( 280      ( 16 Jan 2025 00:41 )             25.9     01-16    135  |  95[L]  |  41[H]  ----------------------------<  122[H]  4.1   |  25  |  3.74[H]    Ca    8.0[L]      16 Jan 2025 00:41  Phos  4.2     01-16  Mg     2.0     01-16    TPro  5.3[L]  /  Alb  2.5[L]  /  TBili  0.7  /  DBili  x   /  AST  23  /  ALT  17  /  AlkPhos  75  01-16        PT/INR - ( 16 Jan 2025 00:51 )   PT: 17.2 sec;   INR: 1.50 ratio         PTT - ( 16 Jan 2025 00:51 )  PTT:33.6 sec  Urinalysis Basic - ( 16 Jan 2025 00:41 )    Color: x / Appearance: x / SG: x / pH: x  Gluc: 122 mg/dL / Ketone: x  / Bili: x / Urobili: x   Blood: x / Protein: x / Nitrite: x   Leuk Esterase: x / RBC: x / WBC x   Sq Epi: x / Non Sq Epi: x / Bacteria: x      Lipid Panel:   I&O's Summary    15 Aquilino 2025 07:01  -  16 Jan 2025 07:00  --------------------------------------------------------  IN: 1555 mL / OUT: 1800 mL / NET: -245 mL    16 Jan 2025 07:01  -  16 Jan 2025 23:28  --------------------------------------------------------  IN: 457.7 mL / OUT: 0 mL / NET: 457.7 mL        RADIOLOGY & ADDITIONAL STUDIES:    < from: TTE W or WO Ultrasound Enhancing Agent (01.15.25 @ 13:06) >     CONCLUSIONS:      1. Left ventricular systolic function is normal with an ejection fraction visually estimated at 55 %.   2. Small pericardial effusion with evidence of hemodynamic compromise (or echocardiographic evidence of cardiac tamponade): respiratory variation across the mitral valve E wave is not greater than 30%.   3. Bilateral pleural effusion noted.    < end of copied text >

## 2025-01-16 NOTE — PROGRESS NOTE ADULT - SUBJECTIVE AND OBJECTIVE BOX
[facility]    Dr. Nica Styles     Office [phone] (9 am to 5 pm)  Service: [phone] (5pm to 9am)  Also Available on TEAMS      RENAL PROGRESS NOTE: DATE OF SERVICE 01-16-25 @ 13:27    Patient is a 54y old  Male who presents with a chief complaint of CAD s/p CABG (14 Jan 2025 07:05)      Patient seen and examined at bedside. No chest pain/sob    VITALS:  T(F): 99.2 (01-16-25 @ 12:00), Max: 99.6 (01-16-25 @ 04:00)  HR: 69 (01-16-25 @ 13:00)  BP: 135/63 (01-16-25 @ 12:00)  RR: 24 (01-16-25 @ 13:00)  SpO2: 100% (01-16-25 @ 13:00)  Wt(kg): --    01-15 @ 07:01 - 01-16 @ 07:00  --------------------------------------------------------  IN: 1555 mL / OUT: 1800 mL / NET: -245 mL    01-16 @ 07:01  - 01-16 @ 13:27  --------------------------------------------------------  IN: 202.5 mL / OUT: 0 mL / NET: 202.5 mL          PHYSICAL EXAM:  Constitutional: NAD  Neck: No JVD  Respiratory: CTAB, no wheezes, rales or rhonchi  Cardiovascular: S1, S2, RRR  Gastrointestinal: BS+, soft, NT/ND  Extremities: No peripheral edema    Hospital Medications:   MEDICATIONS  (STANDING):  acetylcysteine 20%  Inhalation 4 milliLiter(s) Inhalation every 6 hours  albuterol/ipratropium for Nebulization 3 milliLiter(s) Nebulizer every 6 hours  aMIOdarone    Tablet   Oral   aMIOdarone    Tablet 200 milliGRAM(s) Oral daily  ascorbic acid 500 milliGRAM(s) Oral daily  aspirin  chewable 81 milliGRAM(s) Oral daily  atorvastatin 80 milliGRAM(s) Oral at bedtime  bisacodyl Suppository 10 milliGRAM(s) Rectal once  chlorhexidine 0.12% Liquid 15 milliLiter(s) Oral Mucosa every 12 hours  chlorhexidine 2% Cloths 1 Application(s) Topical daily  chlorhexidine 4% Liquid 1 Application(s) Topical <User Schedule>  dexMEDEtomidine Infusion 0.5 MICROgram(s)/kG/Hr (10.6 mL/Hr) IV Continuous <Continuous>  dextrose 50% Injectable 50 milliLiter(s) IV Push every 15 minutes  dextrose 50% Injectable 25 milliLiter(s) IV Push every 15 minutes  DOBUTamine Infusion 2 MICROgram(s)/kG/Min (5.11 mL/Hr) IV Continuous <Continuous>  epoetin ignacia (EPOGEN) Injectable 56692 Unit(s) IV Push <User Schedule>  fentaNYL    Injectable 50 MICROGram(s) IV Push once  gabapentin 100 milliGRAM(s) Oral every 12 hours  hydrALAZINE 10 milliGRAM(s) Oral every 8 hours  insulin lispro (ADMELOG) corrective regimen sliding scale   SubCutaneous every 6 hours  insulin regular Infusion 2 Unit(s)/Hr (2 mL/Hr) IV Continuous <Continuous>  meropenem  IVPB 500 milliGRAM(s) IV Intermittent every 24 hours  Nephro-elicia 1 Tablet(s) Oral daily  pantoprazole  Injectable 40 milliGRAM(s) IV Push daily  propofol Infusion 30 MICROgram(s)/kG/Min (15.3 mL/Hr) IV Continuous <Continuous>  propofol Injectable 40 milliGRAM(s) IV Push once  rocuronium Injectable 100 milliGRAM(s) IV Push once  sodium chloride 0.65% Nasal 1 Spray(s) Both Nostrils every 12 hours  sodium chloride 0.9%. 1000 milliLiter(s) (10 mL/Hr) IV Continuous <Continuous>  tobramycin for Nebulization 300 milliGRAM(s) Inhalation every 12 hours  traZODone 100 milliGRAM(s) Oral at bedtime  vancomycin    Solution 125 milliGRAM(s) Oral every 12 hours      LABS:  01-16    135  |  95[L]  |  41[H]  ----------------------------<  122[H]  4.1   |  25  |  3.74[H]    Ca    8.0[L]      16 Jan 2025 00:41  Phos  4.2     01-16  Mg     2.0     01-16    TPro  5.3[L]  /  Alb  2.5[L]  /  TBili  0.7  /  DBili      /  AST  23  /  ALT  17  /  AlkPhos  75  01-16    Creatinine Trend: 3.74 <--, 4.91 <--, 3.81 <--, 4.59 <--, 3.21 <--, 4.10 <--, 2.98 <--    Albumin: 2.5 g/dL (01-16 @ 00:41)  Phosphorus: 4.2 mg/dL (01-16 @ 00:41)                              8.3    14.10 )-----------( 280      ( 16 Jan 2025 00:41 )             25.9     Urine Studies:  Urinalysis - [01-16-25 @ 00:41]      Color  / Appearance  / SG  / pH       Gluc 122 / Ketone   / Bili  / Urobili        Blood  / Protein  / Leuk Est  / Nitrite       RBC  / WBC  / Hyaline  / Gran  / Sq Epi  / Non Sq Epi  / Bacteria       Iron 29, TIBC 143, %sat 20      [12-19-24 @ 05:00]  Ferritin 904      [12-19-24 @ 05:00]  TSH 4.75      [12-24-24 @ 06:50]    HBsAg Nonreact      [12-19-24 @ 05:00]      RADIOLOGY & ADDITIONAL STUDIES:

## 2025-01-16 NOTE — PROGRESS NOTE ADULT - ASSESSMENT
Assessment  54 year old male with HTN, HLD, ESRD on HD, ascites, CHF, and CAD s/p PCI (4/2024) transferred to [facility] from [facility] for cardiac catheterization due to abnormal stress test. Cath showed multivessel disease now s/p CABG 12/30/24. Course c/b acute on chronic hypoxemic respiratory failure in setting of recurrent mucus plugging on left s/p multiple bronchoscopies.      Recommendations  - C/w  Aggressive pulmonary toilet  - duonebs q6h  - 3% saline nebs q6h  - chest pt   - Consider IPV  - Bronchoscopy today with deep secretions which were lavaged and aspirated.  - Pt planned for tracheostomy for managing secretions      transplant pulm to follow

## 2025-01-16 NOTE — PROCEDURE NOTE - NSPOSTPRCRAD_GEN_A_CORE
central line located in the superior vena cava
central line located in the superior vena cava/no pneumothorax/post-procedure radiography performed

## 2025-01-16 NOTE — PROGRESS NOTE ADULT - SUBJECTIVE AND OBJECTIVE BOX
Patient seen and examined at the bedside.    Remained critically ill on continuous ICU monitoring.    OBJECTIVE:  Vital Signs Last 24 Hrs  T(C): 37.6 (16 Jan 2025 16:00), Max: 37.6 (16 Jan 2025 04:00)  T(F): 99.6 (16 Jan 2025 16:00), Max: 99.6 (16 Jan 2025 04:00)  HR: 70 (16 Jan 2025 19:00) (66 - 86)  BP: 127/63 (16 Jan 2025 19:00) (109/51 - 138/71)  BP(mean): 89 (16 Jan 2025 19:00) (74 - 99)  RR: 22 (16 Jan 2025 19:00) (10 - 24)  SpO2: 98% (16 Jan 2025 19:00) (98% - 100%)    Parameters below as of 16 Jan 2025 18:27  Patient On (Oxygen Delivery Method): ventilator          Physical Exam:   General: Intubated   Neurology: Sedated  Eyes: bilateral pupils equal and reactive   ENT/Neck: +ETT, Neck supple, trachea midline, No JVD   Respiratory: Clear bilaterally   CV: S1S2, no murmurs        [x] Sternal dressing         [x] Sinus rhythm   Abdominal: Soft, NT, ND +BS   Extremities: 1-2+ pedal edema noted, + peripheral pulses   Skin: No Rashes, Hematoma, Ecchymosis                           Assessment:  53 yo M s/p CABG x 3 on 12/30/24    Postop acute pulmonary insufficiency  Cardiogenic and Septic shock  Acute blood loss anemia  Ascites    ESRD on iHD   E coli bacteremia 2/2 pneumonia  Leukocytosis  Plan:   ***Neuro***  [x] Sedated with [x] Diprivan [x] Precedex   Post operative neuro assessment   Gabapentin for pain management  Nightly Trazodone    ***Cardiovascular***  Invasive hemodynamic monitoring, assess perfusion indices   SR / CVP 9 / MAP 71 / Hct 25.9% / Lactate 0.9  [x] Dobutamine - 2 mcg/kg/min  Continuos reassessment of hemodynamics  Amiodarone for Afib prophylaxis   Hydralazine for afterload reduction  [x]  ASA [x] Statin   Serial EKG and cardiac enzymes     ***Pulmonary***  Post op vent management   Titration of FiO2 and PEEP, follow SpO2, CXR, blood gasses   Continue bronchodilators as tolerated.  Trach performed today (1/16)    Mode: AC/ CMV (Assist Control/ Continuous Mandatory Ventilation)  RR (machine): 22  FiO2: 40  PEEP: 10  ITime: 1  MAP: 14  PC: 15  PIP: 25              ***GI***  [x] Resumed TFs, goal is at 60cc/Hr  [x] Protonix    ***Renal***  [x] ESRD on HD   Continue to monitor I/Os, BUN/Creatinine.   Replete lytes PRN  Nephro-Lisette for renal support  Dialysis MWF, Dialysis yesterday for 1L is due for dialysis tomorrow     ***ID***  Meropenem for Bacteremia  Mitch for Respiratory infection  PO Vanco for C. Diff prophylaxis     ***Endocrine***  [x]  DM2 : HbA1c 5.6%                - [x] Insulin gtt  [x]  ISS              - Need tight glycemic control to prevent wound infection.                          Patient requires continuous monitoring with bedside rhythm monitoring, pulse oximetry monitoring, and continuous central venous and arterial pressure monitoring; and intermittent blood gas analysis. Care plan discussed with the ICU care team.   Patient remained critical, at risk for life threatening decompensation.    I have spent 40 minutes providing critical care management to this patient.    By signing my name below, I, Mel Castro, attest that this documentation has been prepared under the direction and in the presence of MYRA Barlow  Electronically signed: Evelio Solitario, 01-16-25 @ 19:58    I, MYRA Barlow, personally performed the services described in this documentation. all medical record entries made by the shubhamibdarlene were at my direction and in my presence. I have reviewed the chart and agree that the record reflects my personal performance and is accurate and complete  Electronically signed: MYRA Barlow Patient seen and examined at the bedside.    Remained critically ill on continuous ICU monitoring.    OBJECTIVE:  Vital Signs Last 24 Hrs  T(C): 37.6 (16 Jan 2025 16:00), Max: 37.6 (16 Jan 2025 04:00)  T(F): 99.6 (16 Jan 2025 16:00), Max: 99.6 (16 Jan 2025 04:00)  HR: 70 (16 Jan 2025 19:00) (66 - 86)  BP: 127/63 (16 Jan 2025 19:00) (109/51 - 138/71)  BP(mean): 89 (16 Jan 2025 19:00) (74 - 99)  RR: 22 (16 Jan 2025 19:00) (10 - 24)  SpO2: 98% (16 Jan 2025 19:00) (98% - 100%)    Parameters below as of 16 Jan 2025 18:27  Patient On (Oxygen Delivery Method): ventilator          Physical Exam:   General: Intubated   Neurology: Sedated  Eyes: bilateral pupils equal and reactive   ENT/Neck: +ETT, Neck supple, trachea midline, No JVD   Respiratory: Clear bilaterally   CV: S1S2, no murmurs        [x] Sternal dressing         [x] Sinus rhythm   Abdominal: Soft, NT, ND +BS   Extremities: 1-2+ pedal edema noted, + peripheral pulses   Skin: No Rashes, Hematoma, Ecchymosis                           Assessment:  53 yo M s/p CABG x 3 on 12/30/24    Postop acute pulmonary insufficiency  Cardiogenic and Septic shock  Acute blood loss anemia  Ascites    ESRD on iHD   E coli bacteremia 2/2 pneumonia  Leukocytosis  Plan:   ***Neuro***  [x] Sedated with [x] Diprivan [x] Precedex   Post operative neuro assessment   Gabapentin for pain management  Nightly Trazodone    ***Cardiovascular***  Invasive hemodynamic monitoring, assess perfusion indices   SR / CVP 2 / MAP 63 / Hct 25.9% / Lactate 0.9  [x] Dobutamine - 2 mcg/kg/min  Continuos reassessment of hemodynamics  Amiodarone for Afib prophylaxis   Hydralazine for afterload reduction  [x]  ASA [x] Statin   Serial EKG and cardiac enzymes     ***Pulmonary***  [x] Tracheostomy Collar  Post op vent management   Titration of FiO2 and PEEP, follow SpO2, CXR, blood gasses   Continue bronchodilators as tolerated.  Trach performed today (1/16)    Mode: AC/ CMV (Assist Control/ Continuous Mandatory Ventilation)  RR (machine): 22  FiO2: 40  PEEP: 10  ITime: 1  MAP: 14  PC: 15  PIP: 25              ***GI***  [x] Resumed TFs, goal is at 60cc/Hr  [x] Protonix    ***Renal***  [x] ESRD on HD   Continue to monitor I/Os, BUN/Creatinine.   Replete lytes PRN  Nephro-Lisette for renal support  Dialysis MWF, Dialysis yesterday for 1L is due for dialysis tomorrow     ***ID***  Meropenem for Bacteremia  Mitch for Respiratory infection  PO Vanco for C. Diff prophylaxis     ***Endocrine***  [x]  DM2 : HbA1c 5.6%                - [x] Insulin gtt  [x]  ISS              - Need tight glycemic control to prevent wound infection.                          Patient requires continuous monitoring with bedside rhythm monitoring, pulse oximetry monitoring, and continuous central venous and arterial pressure monitoring; and intermittent blood gas analysis. Care plan discussed with the ICU care team.   Patient remained critical, at risk for life threatening decompensation.    I have spent 40 minutes providing critical care management to this patient.    By signing my name below, I, Mel Castro, attest that this documentation has been prepared under the direction and in the presence of MYRA Barlow  Electronically signed: Evelio Solitario, 01-16-25 @ 19:58    I, MYRA Barlow, personally performed the services described in this documentation. all medical record entries made by the scribe were at my direction and in my presence. I have reviewed the chart and agree that the record reflects my personal performance and is accurate and complete  Electronically signed: MYRA Barlow Patient seen and examined at the bedside.    Remained critically ill on continuous ICU monitoring.    OBJECTIVE:  Vital Signs Last 24 Hrs  T(C): 37.6 (16 Jan 2025 16:00), Max: 37.6 (16 Jan 2025 04:00)  T(F): 99.6 (16 Jan 2025 16:00), Max: 99.6 (16 Jan 2025 04:00)  HR: 70 (16 Jan 2025 19:00) (66 - 86)  BP: 127/63 (16 Jan 2025 19:00) (109/51 - 138/71)  BP(mean): 89 (16 Jan 2025 19:00) (74 - 99)  RR: 22 (16 Jan 2025 19:00) (10 - 24)  SpO2: 98% (16 Jan 2025 19:00) (98% - 100%)    Parameters below as of 16 Jan 2025 18:27  Patient On (Oxygen Delivery Method): ventilator      Physical Exam:   General: Intubated   Neurology: Off sedation. Neurologically intact - follows commands and moves all 4 extremities. Writes on white board for communication.   Eyes: bilateral pupils equal and reactive   ENT/Neck: +ETT, Neck supple, trachea midline, No JVD. Trach size no ooze, slightly bloody dressings around trach.    Respiratory: Clear bilaterally   CV: S1S2, no murmurs        [x] Sternal dressing         [x] Sinus rhythm   Abdominal: Soft, NT, ND +BS   Extremities: 1-2+ pedal edema noted, + peripheral pulses   Skin: No Rashes. Sacral DTI.                         Assessment:  55 yo M s/p CABG x 3 on 12/30/24    Postop acute pulmonary insufficiency  Cardiogenic and Septic shock  Acute blood loss anemia  Ascites    ESRD on iHD   E coli bacteremia 2/2 pneumonia  Leukocytosis    Plan:   ***Neuro***  Off sedation  Post operative neuro assessment   Gabapentin for pain management  Nightly Trazodone    ***Cardiovascular***  Invasive hemodynamic monitoring, assess perfusion indices   SR / CVP 2 / MAP 63 / Hct 25.9% / Lactate 0.9  [x] Dobutamine - 2 mcg/kg/min  Continuos reassessment of hemodynamics  PO Amiodarone for Afib prophylaxis   Hydralazine 10Q8hr for afterload reduction  [x]  ASA [x] Statin   Serial EKG and cardiac enzymes     ***Pulmonary***  [x] Tracheostomy Collar  Post op vent management   Titration of FiO2 and PEEP, follow SpO2, CXR, blood gasses   Continue bronchodilators as tolerated.  Trach performed today (1/16)    Mode: AC/ CMV (Assist Control/ Continuous Mandatory Ventilation)  RR (machine): 22  FiO2: 40  PEEP: 10  ITime: 1  MAP: 14  PC: 15  PIP: 25            ***GI***  [x] Resumed TFs (nepro), goal is at 60cc/Hr  [x] Protonix    ***Renal***  [x] ESRD on HD   Continue to monitor I/Os, BUN/Creatinine.   Replete lytes PRN  Nephro-Lisette for renal support  Dialysis MWF, Dialysis yesterday for 1L is due for dialysis tomorrow     ***ID***  Meropenem for Bacteremia  Mitch for Respiratory infection  PO Vanco for C. Diff prophylaxis     ***Endocrine***  [x]  DM2 : HbA1c 5.6%                - [x] Insulin gtt  [x]  ISS              - Need tight glycemic control to prevent wound infection.                          Patient requires continuous monitoring with bedside rhythm monitoring, pulse oximetry monitoring, and continuous central venous and arterial pressure monitoring; and intermittent blood gas analysis. Care plan discussed with the ICU care team.   Patient remained critical, at risk for life threatening decompensation.    I have spent 40 minutes providing critical care management to this patient.    By signing my name below, I, Mel Castro, attest that this documentation has been prepared under the direction and in the presence of MYRA Barlow  Electronically signed: Evelio Solitario, 01-16-25 @ 19:58    I, MYRA Barlow, personally performed the services described in this documentation. all medical record entries made by the shubhamibdarlene were at my direction and in my presence. I have reviewed the chart and agree that the record reflects my personal performance and is accurate and complete  Electronically signed: MYRA Barlow Patient seen and examined at the bedside.    Remained critically ill on continuous ICU monitoring.    OBJECTIVE:  Vital Signs Last 24 Hrs  T(C): 37.6 (16 Jan 2025 16:00), Max: 37.6 (16 Jan 2025 04:00)  T(F): 99.6 (16 Jan 2025 16:00), Max: 99.6 (16 Jan 2025 04:00)  HR: 70 (16 Jan 2025 19:00) (66 - 86)  BP: 127/63 (16 Jan 2025 19:00) (109/51 - 138/71)  BP(mean): 89 (16 Jan 2025 19:00) (74 - 99)  RR: 22 (16 Jan 2025 19:00) (10 - 24)  SpO2: 98% (16 Jan 2025 19:00) (98% - 100%)    Parameters below as of 16 Jan 2025 18:27  Patient On (Oxygen Delivery Method): ventilator    Physical Exam:   General: Intubated   Neurology: Off sedation. Neurologically intact - follows commands and moves all 4 extremities. Writes on white board for communication.   Eyes: bilateral pupils equal and reactive   ENT/Neck: +ETT, Neck supple, trachea midline, No JVD. Trach size no ooze, slightly bloody dressings around trach.    Respiratory: Clear bilaterally   CV: S1S2, no murmurs        [x] Sternal dressing         [x] Sinus rhythm   Abdominal: Soft, NT, ND +BS   Extremities: 1-2+ pedal edema noted, + peripheral pulses   Skin: No Rashes. Sacral DTI.                         Assessment:  53 yo M s/p CABG x 3 on 12/30/24    Postop acute pulmonary insufficiency  Cardiogenic and Septic shock  Acute blood loss anemia  Ascites    ESRD on iHD   E coli bacteremia 2/2 pneumonia  Leukocytosis    Plan:   ***Neuro***  Off sedation  Post operative neuro assessment   Gabapentin for pain management  Nightly Trazodone    ***Cardiovascular***  Invasive hemodynamic monitoring, assess perfusion indices   SR / CVP 2 / MAP 63 / Hct 25.9% / Lactate 0.9  [x] Dobutamine - 2 mcg/kg/min  Continuos reassessment of hemodynamics  PO Amiodarone for Afib prophylaxis   Hydralazine 10Q8hr for afterload reduction  [x]  ASA [x] Statin   Serial EKG and cardiac enzymes   Heparin drip held for tracheostomy. Will resume today 1/17.     ***Pulmonary***  [x] Tracheostomy Collar  Post op vent management   Titration of FiO2 and PEEP, follow SpO2, CXR, blood gasses   Continue bronchodilators as tolerated.  Trach performed today (1/16)    Mode: AC/ CMV (Assist Control/ Continuous Mandatory Ventilation)  RR (machine): 22  FiO2: 40  PEEP: 10  ITime: 1  MAP: 14  PC: 15  PIP: 25            ***GI***  [x] Resumed TFs (nepro), goal is at 60cc/Hr  [x] Protonix    ***Renal***  [x] ESRD on HD   Continue to monitor I/Os, BUN/Creatinine.   Replete lytes PRN  Nephro-Lisette for renal support  Dialysis MWF, Dialysis yesterday for 1L. Due for dialysis tomorrow 1/17/25.     ***ID***  Meropenem for Bacteremia  Inhaled Mitch for Respiratory infection ESBL.   PO Vanco for C. Diff prophylaxis  Previous Blood cultures negative (1/9), BAL neg (1/10)     ***Endocrine***  [x]  DM2 : HbA1c 5.6%                - [x] Insulin gtt  [x]  ISS              - Need tight glycemic control to prevent wound infection.    ***MSK  Complaining of R forearm pain.  -Site negative for erythema or edema.  -Ultrasound done today negative for DVT.  -Will order xray r/o frx.             Patient requires continuous monitoring with bedside rhythm monitoring, pulse oximetry monitoring, and continuous central venous and arterial pressure monitoring; and intermittent blood gas analysis. Care plan discussed with the ICU care team.   Patient remained critical, at risk for life threatening decompensation.    I have spent 40 minutes providing critical care management to this patient.    By signing my name below, I, Mel Castro, attest that this documentation has been prepared under the direction and in the presence of MYRA Barlow  Electronically signed: Evelio Solitario, 01-16-25 @ 19:58    I, MYRA Barlow, personally performed the services described in this documentation. all medical record entries made by the shubhamibdarlene were at my direction and in my presence. I have reviewed the chart and agree that the record reflects my personal performance and is accurate and complete  Electronically signed: MYRA Barlow

## 2025-01-16 NOTE — PROGRESS NOTE ADULT - ASSESSMENT
54M with PMH of HTN, HLD, ESRD on HD MWF via LUE AVF, ascites (requiring repeat paracenteses q4-6wks), NICM with moderate LV dysfunction w/ EF 35-40%() and CAD s/p atherectomy + SALLIE to D1(2024) presents to [facility] transferred from [facility]. Patient initially presented to [facility] ED with shortness of breath. Of note he was recently admitted from - for acute cholecystitis s/p cholecystectomy. In [facility] ED, pt was hypoxic to 86% on RA, trop 1.7K, EKG no new changes, CXR fluid overload. Admitted for AHRF 2/2 fluid overload. Pt received HD on , ,  and . DAPT was resumed, TTE showed improvement in LVEF to 40-45%. Stress test was abnormal with 1mm inferior STD, reversible ischemia in the inferior wall and fixed defects in the lateral, anterior and apical walls with post stress EF of 24%.     Pt was then transferred to [facility] for cardiac cath which showed pLAD 70% ISR, mLCx 70%, D1 severe dz.     Patient now transferred to [facility] CTS Dr. Rivers for CABG eval.# CAD s/p NSTEMI  Hx CAD s/p PCI LAD   Presented with ADHF elevated troponins  Positive NST1-Cath- pLAD 70% ISR, mLCx 70%, D1 severe dz.   TTE EF 35% Severe TRWas on Plavix-p2y12- 321 been off Plavix since -POD#1-C3L free LIMA-LAD; SVG-Diag; XDN-mruqTY-Xwvozivpz on  Sinus rhythm,Amio for AF prophylaxis  -OOB off  Sinus rhythm   -POD#3-On /pressors-EF 25-30% RV failure with transaminitis-Sinus Cbhaoq71/03-POD#4-Was intubated for hypoxia-gradual hypotension on /pressors had IABP inserted this morning  -POD#5-s/p IABP insertion, sepsis/septic shock due to GNR/ECOLI HD cath placed   Bronched yesterday 1/3 - minimal secretions with rust-tinged sputum     ESBL-E Coli bacteremia on meropenam    - POD#7 Atrial Fib ,remains intubated weaning IABP 1:3,off pressors on CRRT  -IABP removed.Remains on vent-Alex 10ppm,off Levo- CVP 10 / MAP 71 / Hct 25.6% / Lactate 1.7-Atrial Fibrillation  -Intubated on Alex 10ppm, gtt, BP better-AF~~90's on Amio-had bronch still febrile Tmax 101  -Extubated awake alert on HFNC AF,on Dobutrex-CRRT,Cultures no growth    01/10-OOB on BiPAP Sinus Rhythm on -SR/ MAP 57/ CVP 10/  Hct 26.7 / Lact 1.4  -s/p EDU/DCCV-Sinus rhythm on -SR / MAP 52 / CVP 12/ Hct 25.8 / Lact 0.7-had bronch with BAL Left lung  -Sinus rhythm on -for repeat Bronch-SR / MAP 67 / CVP 11 / Hct 27.4% / Lact 0.8    # Acute on Chronic Systolic Heart Failure  EF-35% ,severe TR  Had HD post op  GDMT post op  LVEF improved post bypass but now at 23-30%-BiV dysfunction on  now off pressors  -TTE EF 40%,trace effusion  ECG c/w pericarditis-on colchicine     Vascular evaluated  AVGraft /?steal causing RV failure-'' Very low suspicion of steal Sd and AVF causing current clinical state. ''   VA Duplex Hemodialysis Access, Left (25 @ 13:35) >   Arterial flow volume of 1301 mL/m, and the venous flow volume of  1492 mL/m are consistent with a normally functioning dialysis access  fistula.        # Ascites  Hx chronic ascites  Has drainage q4-6 weeks  Last drained -4600mL  ? ascites related to severe TR  Had xpjlwylcnxaj-Zirqedt-xa growth   CT Abdomen 01/10-Large volume ascites-consider paracentesis  Monitor      # ESRD  Now off CRRT transition to HD

## 2025-01-17 LAB
ALBUMIN SERPL ELPH-MCNC: 2.6 G/DL — LOW (ref 3.3–5)
ALP SERPL-CCNC: 76 U/L — SIGNIFICANT CHANGE UP (ref 40–120)
ALT FLD-CCNC: 12 U/L — SIGNIFICANT CHANGE UP (ref 10–45)
ANION GAP SERPL CALC-SCNC: 16 MMOL/L — SIGNIFICANT CHANGE UP (ref 5–17)
APTT BLD: 43 SEC — HIGH (ref 24.5–35.6)
APTT BLD: 45.7 SEC — HIGH (ref 24.5–35.6)
AST SERPL-CCNC: 23 U/L — SIGNIFICANT CHANGE UP (ref 10–40)
BASOPHILS NFR BLD AUTO: 0.1 % — SIGNIFICANT CHANGE UP (ref 0–2)
BILIRUB SERPL-MCNC: 0.8 MG/DL — SIGNIFICANT CHANGE UP (ref 0.2–1.2)
BUN SERPL-MCNC: 52 MG/DL — HIGH (ref 7–23)
CALCIUM SERPL-MCNC: 7.9 MG/DL — LOW (ref 8.4–10.5)
CHLORIDE SERPL-SCNC: 96 MMOL/L — SIGNIFICANT CHANGE UP (ref 96–108)
CO2 SERPL-SCNC: 22 MMOL/L — SIGNIFICANT CHANGE UP (ref 22–31)
CREAT SERPL-MCNC: 5.09 MG/DL — HIGH (ref 0.5–1.3)
EGFR: 13 ML/MIN/1.73M2 — LOW
EGFR: 13 ML/MIN/1.73M2 — LOW
EOSINOPHIL # BLD AUTO: 0.24 K/UL — SIGNIFICANT CHANGE UP (ref 0–0.5)
GAS PNL BLDA: SIGNIFICANT CHANGE UP
GAS PNL BLDV: SIGNIFICANT CHANGE UP
GAS PNL BLDV: SIGNIFICANT CHANGE UP
GLUCOSE SERPL-MCNC: 105 MG/DL — HIGH (ref 70–99)
HCT VFR BLD CALC: 26.5 % — LOW (ref 39–50)
HGB BLD-MCNC: 8.7 G/DL — LOW (ref 13–17)
IMM GRANULOCYTES NFR BLD AUTO: 0.6 % — SIGNIFICANT CHANGE UP (ref 0–0.9)
LYMPHOCYTES # BLD AUTO: 0.44 K/UL — LOW (ref 1–3.3)
LYMPHOCYTES # BLD AUTO: 3.8 % — LOW (ref 13–44)
MAGNESIUM SERPL-MCNC: 2.5 MG/DL — SIGNIFICANT CHANGE UP (ref 1.6–2.6)
MCHC RBC-ENTMCNC: 29.9 PG — SIGNIFICANT CHANGE UP (ref 27–34)
MCHC RBC-ENTMCNC: 32.8 G/DL — SIGNIFICANT CHANGE UP (ref 32–36)
MCV RBC AUTO: 91.1 FL — SIGNIFICANT CHANGE UP (ref 80–100)
MONOCYTES # BLD AUTO: 0.56 K/UL — SIGNIFICANT CHANGE UP (ref 0–0.9)
MONOCYTES NFR BLD AUTO: 4.9 % — SIGNIFICANT CHANGE UP (ref 2–14)
NEUTROPHILS # BLD AUTO: 10.18 K/UL — HIGH (ref 1.8–7.4)
NEUTROPHILS NFR BLD AUTO: 88.5 % — HIGH (ref 43–77)
NRBC # BLD: 0 /100 WBCS — SIGNIFICANT CHANGE UP (ref 0–0)
NRBC BLD-RTO: 0 /100 WBCS — SIGNIFICANT CHANGE UP (ref 0–0)
PHOSPHATE SERPL-MCNC: 4.5 MG/DL — SIGNIFICANT CHANGE UP (ref 2.5–4.5)
PLATELET # BLD AUTO: 310 K/UL — SIGNIFICANT CHANGE UP (ref 150–400)
POTASSIUM SERPL-MCNC: 4.3 MMOL/L — SIGNIFICANT CHANGE UP (ref 3.5–5.3)
POTASSIUM SERPL-SCNC: 4.3 MMOL/L — SIGNIFICANT CHANGE UP (ref 3.5–5.3)
PROT SERPL-MCNC: 5.3 G/DL — LOW (ref 6–8.3)
RBC # BLD: 2.91 M/UL — LOW (ref 4.2–5.8)
RBC # FLD: 20.3 % — HIGH (ref 10.3–14.5)
SODIUM SERPL-SCNC: 134 MMOL/L — LOW (ref 135–145)
WBC # BLD: 11.5 K/UL — HIGH (ref 3.8–10.5)
WBC # FLD AUTO: 11.5 K/UL — HIGH (ref 3.8–10.5)

## 2025-01-17 PROCEDURE — 99291 CRITICAL CARE FIRST HOUR: CPT | Mod: 25

## 2025-01-17 PROCEDURE — 99232 SBSQ HOSP IP/OBS MODERATE 35: CPT

## 2025-01-17 PROCEDURE — 36620 INSERTION CATHETER ARTERY: CPT | Mod: RT

## 2025-01-17 PROCEDURE — 71045 X-RAY EXAM CHEST 1 VIEW: CPT | Mod: 26

## 2025-01-17 PROCEDURE — G0545: CPT

## 2025-01-17 PROCEDURE — 73090 X-RAY EXAM OF FOREARM: CPT | Mod: 26,LT

## 2025-01-17 RX ORDER — FENTANYL CITRATE-0.9 % NACL/PF 100MCG/2ML
50 SYRINGE (ML) INTRAVENOUS ONCE
Refills: 0 | Status: DISCONTINUED | OUTPATIENT
Start: 2025-01-17 | End: 2025-01-17

## 2025-01-17 RX ORDER — LIDOCAINE HYDROCHLORIDE 20 MG/ML
1 JELLY TOPICAL ONCE
Refills: 0 | Status: COMPLETED | OUTPATIENT
Start: 2025-01-17 | End: 2025-01-17

## 2025-01-17 RX ORDER — ALBUTEROL SULFATE 2.5 MG/3ML
2.5 VIAL, NEBULIZER (ML) INHALATION EVERY 6 HOURS
Refills: 0 | Status: DISCONTINUED | OUTPATIENT
Start: 2025-01-17 | End: 2025-02-06

## 2025-01-17 RX ORDER — ACETAMINOPHEN 500 MG/5ML
1000 LIQUID (ML) ORAL ONCE
Refills: 0 | Status: COMPLETED | OUTPATIENT
Start: 2025-01-17 | End: 2025-01-17

## 2025-01-17 RX ORDER — DEXMEDETOMIDINE HYDROCHLORIDE IN SODIUM CHLORIDE 4 UG/ML
0.5 INJECTION INTRAVENOUS
Qty: 200 | Refills: 0 | Status: DISCONTINUED | OUTPATIENT
Start: 2025-01-17 | End: 2025-01-18

## 2025-01-17 RX ORDER — ALBUMIN (HUMAN) 12.5 G/50ML
100 INJECTION, SOLUTION INTRAVENOUS ONCE
Refills: 0 | Status: COMPLETED | OUTPATIENT
Start: 2025-01-17 | End: 2025-01-17

## 2025-01-17 RX ORDER — HEPARIN SODIUM 1000 [USP'U]/ML
1300 INJECTION INTRAVENOUS; SUBCUTANEOUS
Qty: 25000 | Refills: 0 | Status: DISCONTINUED | OUTPATIENT
Start: 2025-01-17 | End: 2025-01-18

## 2025-01-17 RX ADMIN — Medication 10 MILLIGRAM(S): at 13:10

## 2025-01-17 RX ADMIN — Medication 2.5 MILLIGRAM(S): at 23:28

## 2025-01-17 RX ADMIN — ACETYLCYSTEINE 4 MILLILITER(S): 200 INHALANT RESPIRATORY (INHALATION) at 05:14

## 2025-01-17 RX ADMIN — Medication 81 MILLIGRAM(S): at 13:10

## 2025-01-17 RX ADMIN — Medication 10 MILLIGRAM(S): at 05:30

## 2025-01-17 RX ADMIN — AMIODARONE HYDROCHLORIDE 200 MILLIGRAM(S): 50 INJECTION, SOLUTION INTRAVENOUS at 05:22

## 2025-01-17 RX ADMIN — LIDOCAINE HYDROCHLORIDE 1 PATCH: 20 JELLY TOPICAL at 03:17

## 2025-01-17 RX ADMIN — HEPARIN SODIUM 13 UNIT(S)/HR: 1000 INJECTION INTRAVENOUS; SUBCUTANEOUS at 21:51

## 2025-01-17 RX ADMIN — Medication 15 MILLILITER(S): at 17:17

## 2025-01-17 RX ADMIN — IPRATROPIUM BROMIDE AND ALBUTEROL SULFATE 3 MILLILITER(S): .5; 2.5 SOLUTION RESPIRATORY (INHALATION) at 17:41

## 2025-01-17 RX ADMIN — LIDOCAINE HYDROCHLORIDE 1 PATCH: 20 JELLY TOPICAL at 16:13

## 2025-01-17 RX ADMIN — Medication 100 MILLIGRAM(S): at 21:52

## 2025-01-17 RX ADMIN — Medication 1 TABLET(S): at 13:09

## 2025-01-17 RX ADMIN — Medication 1 SPRAY(S): at 17:22

## 2025-01-17 RX ADMIN — TOBRAMYCIN 300 MILLIGRAM(S): 300 SOLUTION RESPIRATORY (INHALATION) at 05:14

## 2025-01-17 RX ADMIN — Medication 125 MILLIGRAM(S): at 17:17

## 2025-01-17 RX ADMIN — ACETYLCYSTEINE 4 MILLILITER(S): 200 INHALANT RESPIRATORY (INHALATION) at 23:29

## 2025-01-17 RX ADMIN — ACETYLCYSTEINE 4 MILLILITER(S): 200 INHALANT RESPIRATORY (INHALATION) at 17:42

## 2025-01-17 RX ADMIN — Medication 500 MILLIGRAM(S): at 13:09

## 2025-01-17 RX ADMIN — GABAPENTIN 100 MILLIGRAM(S): 400 CAPSULE ORAL at 05:22

## 2025-01-17 RX ADMIN — TOBRAMYCIN 300 MILLIGRAM(S): 300 SOLUTION RESPIRATORY (INHALATION) at 17:42

## 2025-01-17 RX ADMIN — IPRATROPIUM BROMIDE AND ALBUTEROL SULFATE 3 MILLILITER(S): .5; 2.5 SOLUTION RESPIRATORY (INHALATION) at 11:43

## 2025-01-17 RX ADMIN — ALBUMIN (HUMAN) 50 MILLILITER(S): 12.5 INJECTION, SOLUTION INTRAVENOUS at 10:01

## 2025-01-17 RX ADMIN — Medication 1000 MILLIGRAM(S): at 16:36

## 2025-01-17 RX ADMIN — IPRATROPIUM BROMIDE AND ALBUTEROL SULFATE 3 MILLILITER(S): .5; 2.5 SOLUTION RESPIRATORY (INHALATION) at 05:13

## 2025-01-17 RX ADMIN — DOBUTAMINE 5.11 MICROGRAM(S)/KG/MIN: 250 INJECTION INTRAVENOUS at 07:53

## 2025-01-17 RX ADMIN — Medication 50 MICROGRAM(S): at 01:00

## 2025-01-17 RX ADMIN — LIDOCAINE HYDROCHLORIDE 1 PATCH: 20 JELLY TOPICAL at 07:14

## 2025-01-17 RX ADMIN — PROPOFOL 15.3 MICROGRAM(S)/KG/MIN: 10 INJECTION, EMULSION INTRAVENOUS at 00:15

## 2025-01-17 RX ADMIN — Medication 125 MILLIGRAM(S): at 05:22

## 2025-01-17 RX ADMIN — Medication 15 MILLILITER(S): at 05:22

## 2025-01-17 RX ADMIN — Medication 50 MICROGRAM(S): at 00:30

## 2025-01-17 RX ADMIN — INSULIN LISPRO 2: 100 INJECTION, SOLUTION INTRAVENOUS; SUBCUTANEOUS at 17:23

## 2025-01-17 RX ADMIN — HEPARIN SODIUM 13 UNIT(S)/HR: 1000 INJECTION INTRAVENOUS; SUBCUTANEOUS at 09:28

## 2025-01-17 RX ADMIN — Medication 400 MILLIGRAM(S): at 16:19

## 2025-01-17 RX ADMIN — ATORVASTATIN CALCIUM 80 MILLIGRAM(S): 80 TABLET, FILM COATED ORAL at 21:51

## 2025-01-17 RX ADMIN — Medication 1 APPLICATION(S): at 21:52

## 2025-01-17 RX ADMIN — GABAPENTIN 100 MILLIGRAM(S): 400 CAPSULE ORAL at 17:21

## 2025-01-17 RX ADMIN — Medication 40 MILLIGRAM(S): at 13:10

## 2025-01-17 RX ADMIN — EPOETIN ALFA 10000 UNIT(S): 10000 SOLUTION INTRAVENOUS; SUBCUTANEOUS at 12:00

## 2025-01-17 RX ADMIN — Medication 1 SPRAY(S): at 05:23

## 2025-01-17 RX ADMIN — DOBUTAMINE 2.55 MICROGRAM(S)/KG/MIN: 250 INJECTION INTRAVENOUS at 21:51

## 2025-01-17 RX ADMIN — Medication 10 MILLIGRAM(S): at 21:51

## 2025-01-17 RX ADMIN — ACETYLCYSTEINE 4 MILLILITER(S): 200 INHALANT RESPIRATORY (INHALATION) at 11:43

## 2025-01-17 RX ADMIN — Medication 1 APPLICATION(S): at 05:23

## 2025-01-17 RX ADMIN — MEROPENEM 100 MILLIGRAM(S): 1 INJECTION INTRAVENOUS at 21:50

## 2025-01-17 NOTE — PROGRESS NOTE ADULT - ASSESSMENT
54M with PMH of HTN, HLD, ESRD on HD MWF via LUE AVF, ascites (requiring repeat paracenteses q4-6wks), NICM with moderate LV dysfunction w/ EF 35-40%() and CAD s/p atherectomy + SALLIE to D1(2024) presents to [facility] transferred from [facility]. Patient initially presented to [facility] ED with shortness of breath. Of note he was recently admitted from - for acute cholecystitis s/p cholecystectomy. In [facility] ED, pt was hypoxic to 86% on RA, trop 1.7K, EKG no new changes, CXR fluid overload. Admitted for AHRF 2/2 fluid overload. Pt received HD on , ,  and . DAPT was resumed, TTE showed improvement in LVEF to 40-45%. Stress test was abnormal with 1mm inferior STD, reversible ischemia in the inferior wall and fixed defects in the lateral, anterior and apical walls with post stress EF of 24%. Pt was then transferred to [facility] for cardiac cath which showed pLAD 70% ISR, mLCx 70%, D1 severe dz. Patient now transferred to [facility] CTS Dr. Rivers for CABG eval. Patient denies any current SOB or chest pain on admission. Otherwise denies headaches, abdominal pain, urinary or bowel changes, fevers, chills, N/V/D, sick contacts.  (24 Dec 2024 05:07)   VSS  CP free   HD via avf  for daily HD pre op- on lovenox 30 qd    HD today continue with present meds. Plan for CABG possibleTVR next week   vss; rsr 55-80; hd today w/ 1 unit prbc; pt to be dialzyed also this  w/ another 1 unit prbc   VSS; RSR 60-80; continue asa/bb/ statin; HD in am - transfuse 1 unit prbc with HD; d/c lovenox after am dose sun    - Pt underwent  C3L free LIMA-LAD; SVG-Diag; SVG-leftPL  Pt given cefuroxime perioperatively   pt extubated   . pt with hypotension and ECHO showing Systolic HF/depressed BIV Function, currently supported with /pressors.  Labs this evening showing transaminitis  1/2 -3 pt hypotensive, febrile and reintubated  Pt pancultured. and started on zosyn- meropenem and gent  pt found to have Gram negative bacteremia    E coli +ESBL bacteremia        A/P  #sepsis  pt with decompensation reintubated, now s/p trach  could pt have tracheomalacia ?  Pt was last bacteremic 1/2  paracentesis was bland   Continue meropenem- day # 14 and inhaled tobramycin  check bladder scan- to make sure no urinary source- not distended on CT   U/S of graft  with stenosis but normal function.  await repeat sputum cultures   WBC slowly improving       #Renal failure  dose meds for renal failure         Marla Champion M.D. ,   please reach via teams   If no answer, or after 5PM/ weekends,  then please call  [phone]    Assessment and plan discussed with the primary team .    ID service will be covering over the 3 day weekend. Please call for acute issues or questions. [phone]

## 2025-01-17 NOTE — PROGRESS NOTE ADULT - ASSESSMENT
54M s/p CABG x 3 on 12/30/24 > postop c/b acute respiratory failure 2/2 E coli bacteremia 2/2 pneumonia. Now s/p trach creation 1/16.     Plan:  - Trach sutures to remain in place until POD5 1/21  - surgery to follow   - appreciate care per CTICU     ACS/Trauma Surgery  [phone].

## 2025-01-17 NOTE — PROGRESS NOTE ADULT - SUBJECTIVE AND OBJECTIVE BOX
[facility]    Dr. Nica Styles     Office [phone] (9 am to 5 pm)  Service: [phone] (5pm to 9am)  Also Available on TEAMS      RENAL PROGRESS NOTE: DATE OF SERVICE 01-17-25 @ 15:33    Patient is a 54y old  Male who presents with a chief complaint of CAD s/p CABG (17 Jan 2025 06:13)      Patient seen and examined at bedside. No chest pain/sob    VITALS:  T(F): 98.9 (01-17-25 @ 14:20), Max: 99.6 (01-16-25 @ 16:00)  HR: 91 (01-17-25 @ 15:00)  BP: 142/70 (01-17-25 @ 00:00)  RR: 21 (01-17-25 @ 15:00)  SpO2: 95% (01-17-25 @ 15:00)  Wt(kg): --    01-16 @ 07:01  -  01-17 @ 07:00  --------------------------------------------------------  IN: 1063.7 mL / OUT: 0 mL / NET: 1063.7 mL    01-17 @ 07:01  -  01-17 @ 15:33  --------------------------------------------------------  IN: 1292.9 mL / OUT: 2700 mL / NET: -1407.1 mL          PHYSICAL EXAM:  Constitutional: NAD  Neck: No JVD  Respiratory: CTAB, no wheezes, rales or rhonchi  Cardiovascular: S1, S2, RRR  Gastrointestinal: BS+, soft, NT/ND  Extremities: No peripheral edema    Hospital Medications:   MEDICATIONS  (STANDING):  acetaminophen   IVPB .. 1000 milliGRAM(s) IV Intermittent once  acetylcysteine 20%  Inhalation 4 milliLiter(s) Inhalation every 6 hours  albuterol/ipratropium for Nebulization 3 milliLiter(s) Nebulizer every 6 hours  aMIOdarone    Tablet   Oral   aMIOdarone    Tablet 200 milliGRAM(s) Oral daily  ascorbic acid 500 milliGRAM(s) Oral daily  aspirin  chewable 81 milliGRAM(s) Oral daily  atorvastatin 80 milliGRAM(s) Oral at bedtime  bisacodyl Suppository 10 milliGRAM(s) Rectal once  chlorhexidine 0.12% Liquid 15 milliLiter(s) Oral Mucosa every 12 hours  chlorhexidine 2% Cloths 1 Application(s) Topical daily  chlorhexidine 4% Liquid 1 Application(s) Topical <User Schedule>  dextrose 50% Injectable 50 milliLiter(s) IV Push every 15 minutes  DOBUTamine Infusion 1 MICROgram(s)/kG/Min (2.55 mL/Hr) IV Continuous <Continuous>  epoetin ignacia (EPOGEN) Injectable 54283 Unit(s) IV Push <User Schedule>  gabapentin 100 milliGRAM(s) Oral every 12 hours  heparin  Infusion 1300 Unit(s)/Hr (13 mL/Hr) IV Continuous <Continuous>  hydrALAZINE 10 milliGRAM(s) Oral every 8 hours  insulin lispro (ADMELOG) corrective regimen sliding scale   SubCutaneous every 6 hours  meropenem  IVPB 500 milliGRAM(s) IV Intermittent every 24 hours  Nephro-elicia 1 Tablet(s) Oral daily  pantoprazole  Injectable 40 milliGRAM(s) IV Push daily  sodium chloride 0.65% Nasal 1 Spray(s) Both Nostrils every 12 hours  sodium chloride 0.9%. 1000 milliLiter(s) (10 mL/Hr) IV Continuous <Continuous>  tobramycin for Nebulization 300 milliGRAM(s) Inhalation every 12 hours  traZODone 100 milliGRAM(s) Oral at bedtime  vancomycin    Solution 125 milliGRAM(s) Oral every 12 hours      LABS:  01-17    134[L]  |  96  |  52[H]  ----------------------------<  105[H]  4.3   |  22  |  5.09[H]    Ca    7.9[L]      17 Jan 2025 00:33  Phos  4.5     01-17  Mg     2.5     01-17    TPro  5.3[L]  /  Alb  2.6[L]  /  TBili  0.8  /  DBili      /  AST  23  /  ALT  12  /  AlkPhos  76  01-17    Creatinine Trend: 5.09 <--, 3.74 <--, 4.91 <--, 3.81 <--, 4.59 <--, 3.21 <--, 4.10 <--    Albumin: 2.6 g/dL (01-17 @ 00:33)  Phosphorus: 4.5 mg/dL (01-17 @ 00:33)                              8.7    11.50 )-----------( 310      ( 17 Jan 2025 00:33 )             26.5     Urine Studies:  Urinalysis - [01-17-25 @ 00:33]      Color  / Appearance  / SG  / pH       Gluc 105 / Ketone   / Bili  / Urobili        Blood  / Protein  / Leuk Est  / Nitrite       RBC  / WBC  / Hyaline  / Gran  / Sq Epi  / Non Sq Epi  / Bacteria       Iron 29, TIBC 143, %sat 20      [12-19-24 @ 05:00]  Ferritin 904      [12-19-24 @ 05:00]  TSH 4.75      [12-24-24 @ 06:50]    HBsAg Nonreact      [12-19-24 @ 05:00]      RADIOLOGY & ADDITIONAL STUDIES:

## 2025-01-17 NOTE — PROGRESS NOTE ADULT - SUBJECTIVE AND OBJECTIVE BOX
General Surgery Progress Note    Overnight: RAINER  Subjective: Patient seen and examined on rounds. Tolerating on trach.     Objective:  Vitals:  T(C): 37.1 (01-17-25 @ 04:00), Max: 37.6 (01-16-25 @ 16:00)  HR: 82 (01-17-25 @ 06:00) (66 - 87)  BP: 142/70 (01-17-25 @ 00:00) (110/58 - 142/70)  RR: 22 (01-17-25 @ 06:00) (12 - 27)  SpO2: 100% (01-17-25 @ 06:00) (97% - 100%)  Wt(kg): --    01-16 @ 07:01  -  01-17 @ 07:00  --------------------------------------------------------  IN:    Dexmedetomidine: 180.6 mL    DOBUTamine: 117.3 mL    Enteral Tube Flush: 300 mL    IV PiggyBack: 150 mL    Nepro with Carb Steady: 120 mL    Propofol: 35.7 mL    sodium chloride 0.9%: 110 mL  Total IN: 1013.6 mL    OUT:    Insulin: 0 mL    Propofol: 0 mL  Total OUT: 0 mL    Total NET: 1013.6 mL          Physical Exam:  General: NAD, resting comfortably in bed  HEENT: Trach in place secured, some mild strikethrough on gauze but hemostatic       Labs:                        8.7    11.50 )-----------( 310      ( 17 Jan 2025 00:33 )             26.5     01-17    134[L]  |  96  |  52[H]  ----------------------------<  105[H]  4.3   |  22  |  5.09[H]    Ca    7.9[L]      17 Jan 2025 00:33  Phos  4.5     01-17  Mg     2.5     01-17    TPro  5.3[L]  /  Alb  2.6[L]  /  TBili  0.8  /  DBili  x   /  AST  23  /  ALT  12  /  AlkPhos  76  01-17    LIVER FUNCTIONS - ( 17 Jan 2025 00:33 )  Alb: 2.6 g/dL / Pro: 5.3 g/dL / ALK PHOS: 76 U/L / ALT: 12 U/L / AST: 23 U/L / GGT: x           PT/INR - ( 16 Jan 2025 00:51 )   PT: 17.2 sec;   INR: 1.50 ratio         PTT - ( 16 Jan 2025 00:51 )  PTT:33.6 sec  Urinalysis Basic - ( 17 Jan 2025 00:33 )    Color: x / Appearance: x / SG: x / pH: x  Gluc: 105 mg/dL / Ketone: x  / Bili: x / Urobili: x   Blood: x / Protein: x / Nitrite: x   Leuk Esterase: x / RBC: x / WBC x   Sq Epi: x / Non Sq Epi: x / Bacteria: x

## 2025-01-17 NOTE — CHART NOTE - NSCHARTNOTEFT_GEN_A_CORE
NUTRITION FOLLOW UP NOTE    PATIENT SEEN FOR: ICU follow up    SOURCE: [] Patient  [x] Current Medical Record  [x] RN  [] Family/support person at bedside  [x] Patient unavailable/inappropriate  [] Other:    CHART REVIEWED/EVENTS NOTED.  [] No changes to nutrition care plan to note  [x] Nutrition Status:  -s/p CABG 24  -septic shock  -Hx ESRD on HD, receiving HD sessions in-house  -emergently reintubated 1/15. s/p tracheostomy     DIET ORDER:   Diet, NPO with Tube Feed:   Tube Feeding Modality: Nasogastric  Nepro with Carb Steady (NEPRORTH)  Total Volume for 24 Hours (mL): 1440  Continuous  Starting Tube Feed Rate {mL per Hour}: 60  Until Goal Tube Feed Rate (mL per Hour): 60  Tube Feed Duration (in Hours): 24  Tube Feed Start Time: 13:00  Jeramy(7 Gm Arginine/7 Gm Glut/1.2 Gm HMB     Qty per Day:  2  No Carb Prosource TF     Qty per Day:  1 (25)      CURRENT DIET ORDER IS:  [] Appropriate:  [] Inadequate:  [x] Other: See recommendation below     NUTRITION INTAKE/PROVISION:  [] PO:  [x] Enteral Nutrition:  EN Order + Prosource TF 1x/day Provides:  1440 ml formula, 2682 kcal, 132 g protein, 1047 ml free water; meets 34.3 kcal/kg and 1.69 g protein/kg, based on wt:    Current Pump Rate: 40 ml/hr at time of RD visit (titrating up to goal rate)  5-Day Average Enteral Provision per RN flowsheet: 553 mL, 996 kcals, 45 g protein (note formula changed to Nepro on  and patient having periods of enteral nutrition off for procedures/tests)  [] Parenteral Nutrition:    ANTHROPOMETRICS:  Drug Dosing Weight  Height (cm): 180.3 (30 Dec 2024 12:01)  Weight (kg): 85.1 (30 Dec 2024 12:01)  BMI (kg/m2): 26.2 (30 Dec 2024 12:01)  Weights:   Daily Weight in k.4 (), 77.7 (), 82 (01-15), 81 (), 81 (), 88.7 (01-12), 83.3 (), 97.6 (01-10), 79.6 (), 93.4 (), 87.9 (), 85 (), 86.2 (), 90 (), 84 (), 77.5 (), 79.5 (), 80.4 (), 87.8 (), 88.3 (, standing), 85.3 (, standing), 85 (, standing), 79.7 (), 80.9 (, standing), 81.3 (, standing), 82.6 ().  Weight fluctuations likely in setting of fluid shifts, scale inaccuracies, and/or Inadequate energy intake.    NUTRITIONALLY PERTINENT MEDICATIONS:  MEDICATIONS  (STANDING):  aMIOdarone    Tablet  aMIOdarone    Tablet  ascorbic acid  atorvastatin  bisacodyl Suppository  dextrose 50% Injectable  DOBUTamine Infusion  hydrALAZINE  insulin lispro (ADMELOG) corrective regimen sliding scale  meropenem  IVPB  Nephro-elicia  pantoprazole  Injectable  sodium chloride 0.9%.  tobramycin for Nebulization  vancomycin    Solution       NUTRITIONALLY PERTINENT LABS:   Na134 mmol/L[L] Glu 105 mg/dL[H] K+ 4.3 mmol/L Cr  5.09 mg/dL[H] BUN 52 mg/dL[H]  Phos 4.5 mg/dL  Alb 2.6 g/dL[L] ALT 12 U/L AST 23 U/L Alkaline Phosphatase 76 U/L    A1C with Estimated Average Glucose Result: 5.6 % (24 @ 06:50)          Finger Sticks:  POCT Blood Glucose.: 109 mg/dL ( @ 05:21)  POCT Blood Glucose.: 85 mg/dL ( @ 23:33)  POCT Blood Glucose.: 159 mg/dL ( @ 17:29)  POCT Blood Glucose.: 171 mg/dL ( @ 12:39)      NUTRITIONALLY PERTINENT MEDICATIONS/LABS:  [x] Reviewed  [x] Relevant notes on medications/labs:  -dobutamine for inotrope support  -sliding scale insulin for glycemic control      EDEMA:  [x] Reviewed  [x] Relevant notes: 1+ generalized per flowsheets     GI/ I&O:  [x] Reviewed  [x] Relevant notes: tolerating enteral nutrition per RN  [] Other:    SKIN:   [] No pressure injuries documented, per nursing flowsheet  [x] Pressure injury previously noted  -Per Wound Care :  Sacral region/BL buttocks deep tissue pressure injury   Rt upper back deep tissue pressure injury   [] Change in pressure injury documentation:  [x] Other: midsternal surgical incision from CABG       ESTIMATED NEEDS:  [] No change:  [x] Updated: for intubation  Energy:  9884-0314 kcal/day (28-33 kcal/kg)  Protein:  109-140 g/day (1.4-1.8 g/kg)  Fluid:   ml/day or [x] defer to team  Based on: IBW 172lb/78kg with consideration for HD, skin integrity   Albin State Equation: kcal    NUTRITION DIAGNOSIS:  [x] Prior Dx: Inadequate energy intake, Increased Nutrient Needs (protein-energy, micronutrients)   [] New Dx:    EDUCATION:  [] Yes:  [x] Not appropriate/warranted    NUTRITION CARE PLAN:  1. Enteral nutrition: Continuing titrating up Nepro as tolerated, change goal rate to 50ml/hr and continuing Prosource TF Free 1x/day. Provides 1200ml formula, 2250kcal, 112g protein, 872ml free water. Meets 28.8kcal/kg and 1.44g/kg protein based on IBW 172lb/78kg. Defer additional free water flushes to medical team.  2. Supplements: Discontinue Jeramy supplements as patient receiving HD and has hx ESRD on HD. Continue Prosource TF Free 1x/day as noted above.  3. Multivitamin/mineral supplementation: Continue nephro-elicia for wound healing pending no medical contraindications.    [] Achieved - Continue current nutrition intervention(s)  [] Current medical condition precludes nutrition intervention at this time.    MONITORING AND EVALUATION:   RD remains available upon request and will follow up per protocol.    Corinne Lima RDN, CDN - available via [place] TEAMS

## 2025-01-17 NOTE — PROGRESS NOTE ADULT - ASSESSMENT
54M with PMH of HTN, HLD, ESRD on HD MWF via LUE AVF, ascites (requiring repeat paracenteses q4-6wks), NICM with moderate LV dysfunction w/ EF 35-40%() and CAD s/p atherectomy + SALLIE to D1(2024) presents to [facility] transferred from [facility]. Patient initially presented to [facility] ED with shortness of breath. Of note he was recently admitted from - for acute cholecystitis s/p cholecystectomy. In [facility] ED, pt was hypoxic to 86% on RA, trop 1.7K, EKG no new changes, CXR fluid overload. Admitted for AHRF 2/2 fluid overload. Pt received HD on , ,  and . DAPT was resumed, TTE showed improvement in LVEF to 40-45%. Stress test was abnormal with 1mm inferior STD, reversible ischemia in the inferior wall and fixed defects in the lateral, anterior and apical walls with post stress EF of 24%.     Pt was then transferred to [facility] for cardiac cath which showed pLAD 70% ISR, mLCx 70%, D1 severe dz.     Patient now transferred to [facility] CTS Dr. Rivers for CABG eval.# CAD s/p NSTEMI  Hx CAD s/p PCI LAD   Presented with ADHF elevated troponins  Positive NST1-Cath- pLAD 70% ISR, mLCx 70%, D1 severe dz.   TTE EF 35% Severe TRWas on Plavix-p2y12- 321 been off Plavix since -POD#1-C3L free LIMA-LAD; SVG-Diag; NKD-emtxAV-Qnjummlin on  Sinus rhythm,Amio for AF prophylaxis  -OOB off  Sinus rhythm   -POD#3-On /pressors-EF 25-30% RV failure with transaminitis-Sinus Mbmbhe88/03-POD#4-Was intubated for hypoxia-gradual hypotension on /pressors had IABP inserted this morning  -POD#5-s/p IABP insertion, sepsis/septic shock due to GNR/ECOLI HD cath placed   Bronched yesterday 1/3 - minimal secretions with rust-tinged sputum     ESBL-E Coli bacteremia on meropenam    - POD#7 Atrial Fib ,remains intubated weaning IABP 1:3,off pressors on CRRT  -IABP removed.Remains on vent-Alex 10ppm,off Levo- CVP 10 / MAP 71 / Hct 25.6% / Lactate 1.7-Atrial Fibrillation  -Intubated on Alex 10ppm, gtt, BP better-AF~~90's on Amio-had bronch still febrile Tmax 59217/09-Extubated awake alert on HFNC AF,on Dobutrex-CRRT,Cultures no growth    01/10-OOB on BiPAP Sinus Rhythm on -SR/ MAP 57/ CVP 10/  Hct 26.7 / Lact 1.4  -s/p EDU/DCCV-Sinus rhythm on -SR / MAP 52 / CVP 12/ Hct 25.8 / Lact 0.7-had bronch with BAL Left lung  -Sinus rhythm on -for repeat Bronch-SR / MAP 67 / CVP 11 / Hct 27.4% / Lact 0.8  -s/p Trach on  TTE EF 55%-SR / CVP 2 / MAP 63 / Hct 25.9% / Lactate 0.9        # Acute on Chronic Systolic Heart Failure now improved  EF-35% ,severe TR  Had HD post op  GDMT post op  LVEF improved post bypass but now at 23-30%-BiV dysfunction on  now off pressors  -TTE EF 40%,trace effusion  ECG c/w pericarditis-was on colchicine   01/15-TTE EF 55%    Vascular evaluated  AVGraft /?steal causing RV failure-'' Very low suspicion of steal Sd and AVF causing current clinical state. ''   VA Duplex Hemodialysis Access, Left (25 @ 13:35) >   Arterial flow volume of 1301 mL/m, and the venous flow volume of  1492 mL/m are consistent with a normally functioning dialysis access  fistula.    # Ascites  Hx chronic ascites  Has drainage q4-6 weeks  Last drained -4600mL  ? ascites related to severe TR  Had mjoqrqkfteax-Ktywapz-cm growth   CT Abdomen 01/10-Large volume ascites-consider paracentesis  Monitor      # ESRD  Now off CRRT transition to HD

## 2025-01-17 NOTE — PROGRESS NOTE ADULT - SUBJECTIVE AND OBJECTIVE BOX
MR#[id number]  PATIENT NAME:RAAD CANO    DATE OF SERVICE: 25 @ 06:14  Patient was seen and examined by Solo Quick MD on    25 @ 06:14 .  Interim events noted.Consultant notes ,Labs,Telemetry reviewed by me       HOSPITAL COURSE: HPI:  54M with PMH of HTN, HLD, ESRD on HD MWF via LUE AVF, ascites (requiring repeat paracenteses q4-6wks), NICM with moderate LV dysfunction w/ EF 35-40%() and CAD s/p atherectomy + SALLIE to D1(2024) presents to [facility] transferred from [facility]. Patient initially presented to [facility] ED with shortness of breath. Of note he was recently admitted from - for acute cholecystitis s/p cholecystectomy. In [facility] ED, pt was hypoxic to 86% on RA, trop 1.7K, EKG no new changes, CXR fluid overload. Admitted for AHRF 2/2 fluid overload. Pt received HD on , ,  and . DAPT was resumed, TTE showed improvement in LVEF to 40-45%. Stress test was abnormal with 1mm inferior STD, reversible ischemia in the inferior wall and fixed defects in the lateral, anterior and apical walls with post stress EF of 24%. Pt was then transferred to [facility] for cardiac cath which showed pLAD 70% ISR, mLCx 70%, D1 severe dz. Patient now transferred to [facility] CTS Dr. Rivers for CABG eval. Patient denies any current SOB or chest pain on admission. Otherwise denies headaches, abdominal pain, urinary or bowel changes, fevers, chills, N/V/D, sick contacts.  (24 Dec 2024 05:07)      INTERIM EVENTS:Patient seen at bedside ,interim events noted.   Cardiac cath  pLAD 70% ISR, mLCx 70%, D1 severe dz.   -POD#1-C3L free LIMA-LAD; SVG-Diag; SVG-leftPL Awake OOB extubated Sinus rhythm on Dobutamine gtt  - Was intubated last night for airway protection s/p bronchoscopy This morning had IABP inserted  remains sedated intubated Sinus Rhythm  - s/p IABP insertion, sepsis/septic shock due to GNR/ECOLI -Paracentesis for suspected bacterial peritonitis-no growth  -Was cardioverted yesterday remains in Sinus rhythm-had Bronch earlier L lung mucus-on  On HFNC-40%/30L Trial HD  -OOB remains on HFNC for repeat bronch today-Sinus rhythm On /Vasopressin gtt   -OOB NGT,less dyspneac on  gtt  -s/p Trach-vented on -SR / CVP 2 / MAP 63 / Hct 25.9% / Lactate 0.9        MEDICATIONS  (STANDING):  acetylcysteine 20%  Inhalation 4 milliLiter(s) Inhalation every 6 hours  albuterol/ipratropium for Nebulization 3 milliLiter(s) Nebulizer every 6 hours  aMIOdarone    Tablet   Oral   aMIOdarone    Tablet 200 milliGRAM(s) Oral daily  ascorbic acid 500 milliGRAM(s) Oral daily  aspirin  chewable 81 milliGRAM(s) Oral daily  atorvastatin 80 milliGRAM(s) Oral at bedtime  bisacodyl Suppository 10 milliGRAM(s) Rectal once  chlorhexidine 0.12% Liquid 15 milliLiter(s) Oral Mucosa every 12 hours  chlorhexidine 2% Cloths 1 Application(s) Topical daily  chlorhexidine 4% Liquid 1 Application(s) Topical <User Schedule>  dextrose 50% Injectable 50 milliLiter(s) IV Push every 15 minutes  DOBUTamine Infusion 2 MICROgram(s)/kG/Min (5.11 mL/Hr) IV Continuous <Continuous>  epoetin ignacia (EPOGEN) Injectable 57214 Unit(s) IV Push <User Schedule>  gabapentin 100 milliGRAM(s) Oral every 12 hours  hydrALAZINE 10 milliGRAM(s) Oral every 8 hours  insulin lispro (ADMELOG) corrective regimen sliding scale   SubCutaneous every 6 hours  meropenem  IVPB 500 milliGRAM(s) IV Intermittent every 24 hours  Nephro-elicia 1 Tablet(s) Oral daily  pantoprazole  Injectable 40 milliGRAM(s) IV Push daily  sodium chloride 0.65% Nasal 1 Spray(s) Both Nostrils every 12 hours  sodium chloride 0.9%. 1000 milliLiter(s) (10 mL/Hr) IV Continuous <Continuous>  tobramycin for Nebulization 300 milliGRAM(s) Inhalation every 12 hours  traZODone 100 milliGRAM(s) Oral at bedtime  vancomycin    Solution 125 milliGRAM(s) Oral every 12 hours    MEDICATIONS  (PRN):  benzocaine 20% Spray 1 Spray(s) Topical every 6 hours PRN throat pain  lidocaine/prilocaine Cream 1 Application(s) Topical daily PRN pre-HD  sodium chloride 0.9% lock flush 10 milliLiter(s) IV Push every 1 hour PRN Pre/post blood products, medications, blood draw, and to maintain line patency            REVIEW OF SYSTEMS:  Constitutional: [ ] fever, [ ]weight loss,  [ ]fatigue [ ]weight gain  Eyes: [ ] visual changes  Respiratory: [ ]shortness of breath;  [ ] cough, [ ]wheezing, [ ]chills, [ ]hemoptysis  Cardiovascular: [ ] chest pain, [ ]palpitations, [ ]dizziness,  [ ]leg swelling[ ]orthopnea[ ]PND  Gastrointestinal: [ ] abdominal pain, [ ]nausea, [ ]vomiting,  [ ]diarrhea [ ]Constipation [ ]Melena  Genitourinary: [ ] dysuria, [ ] hematuria [ ]Vigil  Neurologic: [ ] headaches [ ] tremors[ ]weakness [ ]Paralysis Right[ ] Left[ ]  Skin: [ ] itching, [ ]burning, [ ] rashes  Endocrine: [ ] heat or cold intolerance  Musculoskeletal: [ ] joint pain or swelling; [ ] muscle, back, or extremity pain  Psychiatric: [ ] depression, [ ]anxiety, [ ]mood swings, or [ ]difficulty sleeping  Hematologic: [ ] easy bruising, [ ] bleeding gums    [ ] All remaining systems negative except as per above.   [ x]Unable to obtain.  [ ] No change in ROS since admission      Vital Signs Last 24 Hrs  T(C): 37.1 (2025 04:00), Max: 37.6 (2025 16:00)  T(F): 98.7 (2025 04:00), Max: 99.6 (2025 16:00)  HR: 75 (2025 05:50) (66 - 83)  BP: 142/70 (2025 00:00) (109/51 - 142/70)  BP(mean): 99 (2025 00:00) (74 - 99)  RR: 20 (2025 04:00) (12 - 27)  SpO2: 100% (2025 05:50) (97% - 100%)    Parameters below as of 2025 05:50  Patient On (Oxygen Delivery Method): ventilator      I&O's Summary    15 [address] 2025 07:01  -  2025 07:00  --------------------------------------------------------  IN: 1555 mL / OUT: 1800 mL / NET: -245 mL    2025 07:01  -  2025 06:14  --------------------------------------------------------  IN: 833.4 mL / OUT: 0 mL / NET: 833.4 mL        PHYSICAL EXAM:  General: No acute distress BMI-29  HEENT: EOMI, PERRL  Neck: Supple, [ ] JVD[x] Trach collar  Lungs: Equal air entry bilaterally; [ ] rales [ ] wheezing [ ] rhonchi  Heart: Regular rate and rhythm; [x ] murmur   2/6 [ x] systolic [ ] diastolic [ ] radiation[ ] rubs [ ]  gallops  Abdomen: Nontender, bowel sounds present  Extremities: No clubbing, cyanosis, [ ] edema [ ]Pulses  equal and intact  Nervous system:  Trach  Psychiatric: Normal affect  Skin: No rashes or lesions    LABS:      134[L]  |  96  |  52[H]  ----------------------------<  105[H]  4.3   |  22  |  5.09[H]    Ca    7.9[L]      2025 00:33  Phos  4.5       Mg     2.5         TPro  5.3[L]  /  Alb  2.6[L]  /  TBili  0.8  /  DBili  x   /  AST  23  /  ALT  12  /  AlkPhos  76      Creatinine Trend: 5.09<--, 3.74<--, 4.91<--, 3.81<--, 4.59<--, 3.21<--                        8.7    11.50 )-----------( 310      ( 2025 00:33 )             26.5     PT/INR - ( 2025 00:51 )   PT: 17.2 sec;   INR: 1.50 ratio         PTT - ( 2025 00:51 )  PTT:33.6 sec      Xray Chest 1 View- PORTABLE-Urgent (Xray Chest 1 View- PORTABLE-Urgent .) (25 @ 14:08) >  COMPARISON STUDY: 1/15/2025    Frontal expiratory view of the chest shows the heart to be similarly  enlarged in size. Endotracheal tube, left jugular central line and  feeding tube are present.    The lungs show mild pulmonary congestion with small to moderate left  pleural effusion and there is no evidence of pneumothorax nor right   pleural effusion.    Chest one view 2025 1:04 PM  Compared to the prior study, tracheostomy tube replaces endotracheal  tube. Right jugular central line reaches the SVC/right atrial junction.   The left lung base is clearer.    IMPRESSION:  Left base clearing. Lines as noted.      12 Lead ECG (01.10.25 @ 12:30) >   SINUS RHYTHM WITH 1ST DEGREE A-V BLOCK  LEFT AXIS DEVIATION  NON-SPECIFIC INTRA-VENTRICULAR CONDUCTION BLOCK  MINIMAL VOLTAGE CRITERIA FOR LVH, MAY BE NORMAL VARIANT ( [place] product )  ABNORMAL ECG       TTE W or WO Ultrasound Enhancing Agent (01.15.25 @ 13:06) >  CONCLUSIONS:      1. Left ventricular systolic function is normal with an ejection fraction visually estimated at 55 %.   2. Small pericardial effusion with evidence of hemodynamic compromise (or echocardiographic evidence of cardiac tamponade): respiratory variation across the mitral valve E wave is not greater than 30%.   3. Bilateral pleural effusion noted.

## 2025-01-17 NOTE — PROGRESS NOTE ADULT - ASSESSMENT
54M with PMH of HTN, HLD, ESRD on HD MWF via LUE AVF, ascites (requiring repeat paracenteses q4-6wks), NICM with moderate LV dysfunction w/ EF 35-40%(2023) and CAD s/p atherectomy + SALLIE to D1(4/11/2024) presents to [facility] transferred from [facility] for CABG eval  Nephro on [facility] for HD    ESRD on HD   Regular schedule MWF   Access LUE AVF  [facility]  Nephrologist Dr. Bailey  Last HD on 12/24  S/p HD on 12/27 12/29, 12/30 for hyperkalemia and 12/31  2 L PUF On 01/02/2024  However hospital course now complicated by Cardiogenic shock now on multiple pressors,   CRRT initiated 01/03, off since 01/08   S/p PUF on 01/09   HD held on 01/10 due to instability,  S/p HD on 01/11 2 L UF rmeoved  S/p HD on 01/13 and 01/15   S/p HD on 01/15 and due for HD today on minimal pressor  Keep MAP>65  Renal Diet  Consent in physical chart    Hyper/Hypokalemia  Monitor K, will change dialysate fluid as needed    HTN  currently on pressure support  monitor bp     CKD MBD  Can send a PTH  Ca and Phos daily    Anemia  CRISTIANE with HD  Transfuse if hgb <7    CAD with multivessel disease  s/p CABG 12/30  CTICU following    Hypoxic Resp failure requiring Trach  Per surgery

## 2025-01-17 NOTE — PROGRESS NOTE ADULT - SUBJECTIVE AND OBJECTIVE BOX
Patient seen and examined at the bedside.    Remains critically ill on continuous ICU monitoring.    Brief Summary:  53 yo M with multiple medical problems including CAD s/p PCI in April 2024 s/p CABG x 3 on 12/30/24 1/2: Intubated for hypoxia & left lung white out s/p awake bronch with removal of copious mucopurulent secretions  1/4: s/p IABP insertion, sepsis/septic shock due to E coli   1/15: Emergently reintubated.    24 Hour events:  Bronchoscopy with copious left sided secretions - BAL sent.  Tracheostomy yesterday    Objective:  Vital Signs Last 24 Hrs  T(C): 37.1 (17 Jan 2025 04:00), Max: 37.6 (16 Jan 2025 16:00)  T(F): 98.7 (17 Jan 2025 04:00), Max: 99.6 (16 Jan 2025 16:00)  HR: 82 (17 Jan 2025 06:00) (66 - 87)  BP: 142/70 (17 Jan 2025 00:00) (109/51 - 142/70)  BP(mean): 99 (17 Jan 2025 00:00) (74 - 99)  RR: 22 (17 Jan 2025 06:00) (12 - 27)  SpO2: 100% (17 Jan 2025 06:00) (97% - 100%)    Parameters below as of 17 Jan 2025 05:50  Patient On (Oxygen Delivery Method): ventilator    Mode: AC/ CMV (Assist Control/ Continuous Mandatory Ventilation)  RR (machine): 22  FiO2: 40  PEEP: 10  ITime: 1  MAP: 16  PC: 15  PIP: 25    Physical Exam:  General: Alert and interactive, trach to vent.  Neurology: Oriented, following commands  Respiratory: Breath sounds bilaterally  CV: Sinus  Abdominal: Soft, Nontender  Extremities: Warm, well-perfused  -------------------------------------------------------------------------------------------------------------------------------    Labs:                        8.7    11.50 )-----------( 310      ( 17 Jan 2025 00:33 )             26.5     01-17    134[L]  |  96  |  52[H]  ----------------------------<  105[H]  4.3   |  22  |  5.09[H]    Ca    7.9[L]      17 Jan 2025 00:33  Phos  4.5     01-17  Mg     2.5     01-17    TPro  5.3[L]  /  Alb  2.6[L]  /  TBili  0.8  /  DBili  x   /  AST  23  /  ALT  12  /  AlkPhos  76  01-17    LIVER FUNCTIONS - ( 17 Jan 2025 00:33 )  Alb: 2.6 g/dL / Pro: 5.3 g/dL / ALK PHOS: 76 U/L / ALT: 12 U/L / AST: 23 U/L / GGT: x           PT/INR - ( 16 Jan 2025 00:51 )   PT: 17.2 sec;   INR: 1.50 ratio       PTT - ( 16 Jan 2025 00:51 )  PTT:33.6 sec  ABG - ( 17 Jan 2025 04:50 )  pH, Arterial: 7.47  pH, Blood: x     /  pCO2: 36    /  pO2: 190   / HCO3: 26    / Base Excess: 2.5   /  SaO2: 100.0   ------------------------------------------------------------------------------------------------------------------------------  Assessment:  53 yo M s/p CABG x 3 on 12/30/24    Postop acute respiratory failure  Acute blood loss anemia  Ascites    ESRD on iHD   E coli bacteremia 2/2 pneumonia  Leukocytosis     Plan:  ***Neuro***  Postoperative acute pain control with Gabapentin and prns.  Precedex if needed to help with sleep in addition to Trazodone nightly  PT ongoing.    ***Cardiovascular***  s/p CABG x 3  Remains on low dose Dobutamine  A fib s/p cardioversion - Amiodarone  Hydralazine for hypertension.  ASA / Statin daily    ***Pulmonary***  Postoperative acute respiratory failure  Tracheostomy yesterday.  Bronchoscopy daily.    ***GI***  On Tube feeds.  Protonix for stress ulcer prophylaxis.   Ascites: Last paracentesis 1/11    ***Renal***  ESRD - last HD 1/15.  LUE AV fistula     ***ID***  Sepsis/septic shock due to ESBL E. coli - Continue Meropenem. Trend leukocytosis   Diflucan empirically.  Also on Mitch nebs.  PO Vancomycin for C diff prophylaxis   Mccauley stain negative.  Central line change yesterday.    ***Endocrine***  Hyperglycemia - Insulin sliding scale    ***Hematology***  Acute blood loss anemia  No current transfusion indication, trend CBC and monitor for bleeding.  Epogen.        Care plan discussed with the ICU care team.   Patient remains critical, at risk for life threatening decompensation.    I have spent 30 minutes providing critical care management to this patient.    By signing my name below, I, Baldemar Hernandez, attest that this documentation has been prepared under the direction and in the presence of Jen Sierra MD.  Electronically signed: Baldemar Hernandez, 01-17-25 @ 06:37    I, Jen Sierra,  personally performed the services described in this documentation. All medical record entries made by the scribe were at my direction and in my presence. I have reviewed the chart and agree that the record reflects my personal performance and is accurate and complete  Electronically signed: Jen Sierra MD. Patient seen and examined at the bedside.    Remains critically ill on continuous ICU monitoring.    Brief Summary:  55 yo M with multiple medical problems including CAD s/p PCI in April 2024 s/p CABG x 3 on 12/30/24 1/2: Intubated for hypoxia & left lung white out s/p awake bronch with removal of copious mucopurulent secretions  1/4: s/p IABP insertion, sepsis/septic shock due to E coli   1/15: Emergently reintubated.    24 Hour events:  Tracheostomy yesterday.  Bronchoscopy performed - still with copious secretions but improved from day prior.  OOB to chair this morning.    Objective:  Vital Signs Last 24 Hrs  T(C): 37.1 (17 Jan 2025 04:00), Max: 37.6 (16 Jan 2025 16:00)  T(F): 98.7 (17 Jan 2025 04:00), Max: 99.6 (16 Jan 2025 16:00)  HR: 82 (17 Jan 2025 06:00) (66 - 87)  BP: 142/70 (17 Jan 2025 00:00) (109/51 - 142/70)  BP(mean): 99 (17 Jan 2025 00:00) (74 - 99)  RR: 22 (17 Jan 2025 06:00) (12 - 27)  SpO2: 100% (17 Jan 2025 06:00) (97% - 100%)    Parameters below as of 17 Jan 2025 05:50  Patient On (Oxygen Delivery Method): ventilator    Mode: AC/ CMV (Assist Control/ Continuous Mandatory Ventilation)  RR (machine): 22  FiO2: 40  PEEP: 10  ITime: 1  MAP: 16  PC: 15  PIP: 25    Physical Exam:  General: Alert and interactive, trach to vent.  Neurology: Oriented, following commands  Respiratory: Breath sounds bilaterally  CV: Sinus  Abdominal: Soft, Nontender  Extremities: Warm, well-perfused  Right arm tender, but stable    -------------------------------------------------------------------------------------------------------------------------------    Labs:                        8.7    11.50 )-----------( 310      ( 17 Jan 2025 00:33 )             26.5     01-17    134[L]  |  96  |  52[H]  ----------------------------<  105[H]  4.3   |  22  |  5.09[H]    Ca    7.9[L]      17 Jan 2025 00:33  Phos  4.5     01-17  Mg     2.5     01-17    TPro  5.3[L]  /  Alb  2.6[L]  /  TBili  0.8  /  DBili  x   /  AST  23  /  ALT  12  /  AlkPhos  76  01-17    LIVER FUNCTIONS - ( 17 Jan 2025 00:33 )  Alb: 2.6 g/dL / Pro: 5.3 g/dL / ALK PHOS: 76 U/L / ALT: 12 U/L / AST: 23 U/L / GGT: x           PT/INR - ( 16 Jan 2025 00:51 )   PT: 17.2 sec;   INR: 1.50 ratio       PTT - ( 16 Jan 2025 00:51 )  PTT:33.6 sec  ABG - ( 17 Jan 2025 04:50 )  pH, Arterial: 7.47  pH, Blood: x     /  pCO2: 36    /  pO2: 190   / HCO3: 26    / Base Excess: 2.5   /  SaO2: 100.0   ------------------------------------------------------------------------------------------------------------------------------  Assessment:  55 yo M s/p CABG x 3 on 12/30/24    Postop acute respiratory failure  Acute blood loss anemia  Ascites    ESRD on iHD   E coli bacteremia 2/2 pneumonia  Leukocytosis     Plan:  ***Neuro***  Postoperative acute pain control with Gabapentin and prns.  Trazodone nightly with Precedex overnight if needed.  PT ongoing.    ***Cardiovascular***  s/p CABG x 3  Remains on low dose Dobutamine - will wean slightly.  A fib s/p cardioversion - Amiodarone daily.  Hydralazine for hypertension.  ASA / Statin daily    ***Pulmonary***  Postoperative acute respiratory failure  Tracheostomy yesterday - will start trach collar trials today.  Bronchoscopy other day.    ***GI***  On Tube feeds.  Protonix for stress ulcer prophylaxis.   Ascites: Last paracentesis 1/11    ***Renal***  ESRD - last HD 1/15.  LUE AV fistula     ***ID***  Sepsis/septic shock due to ESBL E. coli - Continue Meropenem. Trend leukocytosis   Also on Mitch nebs.  PO Vancomycin for C diff prophylaxis   Mccauley stain negative.  Sacral wound - continue wound care, may benefit from a VAC    ***Endocrine***  Hyperglycemia - Insulin sliding scale    ***Hematology***  Acute blood loss anemia  No current transfusion indication, trend CBC and monitor for bleeding.  Resume Heparin infusion for recent A fib.  Epogen.        Care plan discussed with the ICU care team.   Patient remains critical, at risk for life threatening decompensation.    I have spent 50 minutes providing critical care management to this patient.    By signing my name below, I, Baldemar Hernandez, attest that this documentation has been prepared under the direction and in the presence of Jen Sierra MD.  Electronically signed: Baldemar Hernandez, 01-17-25 @ 06:37    I, Jen Sierra,  personally performed the services described in this documentation. All medical record entries made by the scribe were at my direction and in my presence. I have reviewed the chart and agree that the record reflects my personal performance and is accurate and complete  Electronically signed: Jen Sierra MD.

## 2025-01-17 NOTE — PROGRESS NOTE ADULT - SUBJECTIVE AND OBJECTIVE BOX
Patient is a 54y old  Male who presents with a chief complaint of CAD s/p CABG (17 Jan 2025 06:13)    Being followed by ID for        Interval history:  No other acute events      ROS:  No cough,SOB,CP  No N/V/D  No abd pain  No urinary complaints  No HA  No joint or limb pain  No other complaints    PAST MEDICAL & SURGICAL HISTORY:  Type 2 diabetes mellitus      Hypertension      End stage renal disease  started HD 2/2019 T, Th, Sat via right chest permacath      Bone spur  right shoulder- hx of - sx done      Anemia      Injury of right wrist  hx of at age 14      History of hyperkalemia  before HD- K-6.2 - to repeat in am of sx, pt had HD today      S/P arthroscopy of right shoulder  2010      History of vascular access device  right chest permacath - 2/2019      H/O right wrist surgery  tendons repair - at age 14      AV fistula      H/O ventral hernia repair      S/P cholecystectomy        Allergies    No Known Allergies    Intolerances      Antimicrobials:    meropenem  IVPB 500 milliGRAM(s) IV Intermittent every 24 hours  tobramycin for Nebulization 300 milliGRAM(s) Inhalation every 12 hours  vancomycin    Solution 125 milliGRAM(s) Oral every 12 hours    MEDICATIONS  (STANDING):  acetylcysteine 20%  Inhalation 4 milliLiter(s) Inhalation every 6 hours  albuterol/ipratropium for Nebulization 3 milliLiter(s) Nebulizer every 6 hours  aMIOdarone    Tablet   Oral   aMIOdarone    Tablet 200 milliGRAM(s) Oral daily  ascorbic acid 500 milliGRAM(s) Oral daily  aspirin  chewable 81 milliGRAM(s) Oral daily  atorvastatin 80 milliGRAM(s) Oral at bedtime  bisacodyl Suppository 10 milliGRAM(s) Rectal once  chlorhexidine 0.12% Liquid 15 milliLiter(s) Oral Mucosa every 12 hours  chlorhexidine 2% Cloths 1 Application(s) Topical daily  chlorhexidine 4% Liquid 1 Application(s) Topical <User Schedule>  dextrose 50% Injectable 50 milliLiter(s) IV Push every 15 minutes  DOBUTamine Infusion 1 MICROgram(s)/kG/Min (2.55 mL/Hr) IV Continuous <Continuous>  epoetin ignacia (EPOGEN) Injectable 82625 Unit(s) IV Push <User Schedule>  gabapentin 100 milliGRAM(s) Oral every 12 hours  heparin  Infusion 1300 Unit(s)/Hr (13 mL/Hr) IV Continuous <Continuous>  hydrALAZINE 10 milliGRAM(s) Oral every 8 hours  insulin lispro (ADMELOG) corrective regimen sliding scale   SubCutaneous every 6 hours  meropenem  IVPB 500 milliGRAM(s) IV Intermittent every 24 hours  Nephro-elicia 1 Tablet(s) Oral daily  pantoprazole  Injectable 40 milliGRAM(s) IV Push daily  sodium chloride 0.65% Nasal 1 Spray(s) Both Nostrils every 12 hours  sodium chloride 0.9%. 1000 milliLiter(s) (10 mL/Hr) IV Continuous <Continuous>  tobramycin for Nebulization 300 milliGRAM(s) Inhalation every 12 hours  traZODone 100 milliGRAM(s) Oral at bedtime  vancomycin    Solution 125 milliGRAM(s) Oral every 12 hours      Vital Signs Last 24 Hrs  T(C): 36.8 (01-17-25 @ 08:00), Max: 37.6 (01-16-25 @ 16:00)  T(F): 98.2 (01-17-25 @ 08:00), Max: 99.6 (01-16-25 @ 16:00)  HR: 96 (01-17-25 @ 11:00) (66 - 96)  BP: 142/70 (01-17-25 @ 00:00) (123/60 - 142/70)  BP(mean): 99 (01-17-25 @ 00:00) (86 - 99)  RR: 23 (01-17-25 @ 11:00) (13 - 27)  SpO2: 98% (01-17-25 @ 11:00) (97% - 100%)    Physical Exam:    Constitutional well preserved,comfortable,pleasant    HEENT PERRLA EOMI,No pallor or icterus    No oral exudate or erythema    Neck supple no JVD or LN    Chest Good AE,CTA    CVS RRR S1 S2 WNl No murmur or rub or gallop    Abd soft BS normal No tenderness no masses    Ext No cyanosis clubbing or edema    IV site no erythema tenderness or discharge    Joints no swelling or LOM    CNS AAO X 3 no focal    Lab Data:                          8.7    11.50 )-----------( 310      ( 17 Jan 2025 00:33 )             26.5       01-17    134[L]  |  96  |  52[H]  ----------------------------<  105[H]  4.3   |  22  |  5.09[H]    Ca    7.9[L]      17 Jan 2025 00:33  Phos  4.5     01-17  Mg     2.5     01-17    TPro  5.3[L]  /  Alb  2.6[L]  /  TBili  0.8  /  DBili  x   /  AST  23  /  ALT  12  /  AlkPhos  76  01-17              Bronchial  01-11-25   Culture is being performed.  --  --      Bronchial  01-10-25   Commensal manjit consistent with body site  --    No polymorphonuclear leukocytes seen per low power field  No squamous epithelial cells seen per low power field  No organisms seen per oil power field                    WBC Count: 11.50 (01-17-25 @ 00:33)  WBC Count: 14.10 (01-16-25 @ 00:41)  WBC Count: 17.07 (01-15-25 @ 00:22)  WBC Count: 21.76 (01-14-25 @ 00:26)  WBC Count: 21.73 (01-13-25 @ 00:26)  WBC Count: 21.67 (01-12-25 @ 00:30)  WBC Count: 29.61 (01-11-25 @ 00:32)       Bilirubin Total: 0.8 mg/dL (01-17-25 @ 00:33)  Aspartate Aminotransferase (AST/SGOT): 23 U/L (01-17-25 @ 00:33)  Alanine Aminotransferase (ALT/SGPT): 12 U/L (01-17-25 @ 00:33)  Alkaline Phosphatase: 76 U/L (01-17-25 @ 00:33)  Bilirubin Total: 0.7 mg/dL (01-16-25 @ 00:41)  Aspartate Aminotransferase (AST/SGOT): 23 U/L (01-16-25 @ 00:41)  Alanine Aminotransferase (ALT/SGPT): 17 U/L (01-16-25 @ 00:41)  Alkaline Phosphatase: 75 U/L (01-16-25 @ 00:41)  Bilirubin Total: 0.7 mg/dL (01-15-25 @ 00:22)  Aspartate Aminotransferase (AST/SGOT): 31 U/L (01-15-25 @ 00:22)  Alanine Aminotransferase (ALT/SGPT): 23 U/L (01-15-25 @ 00:22)  Alkaline Phosphatase: 88 U/L (01-15-25 @ 00:22)  Bilirubin Total: 0.7 mg/dL (01-14-25 @ 00:26)  Aspartate Aminotransferase (AST/SGOT): 21 U/L (01-14-25 @ 00:26)  Alanine Aminotransferase (ALT/SGPT): 26 U/L (01-14-25 @ 00:26)  Alkaline Phosphatase: 85 U/L (01-14-25 @ 00:26)  Bilirubin Total: 1.0 mg/dL (01-13-25 @ 00:26)  Aspartate Aminotransferase (AST/SGOT): 23 U/L (01-13-25 @ 00:26)  Alanine Aminotransferase (ALT/SGPT): 36 U/L (01-13-25 @ 00:26)  Alkaline Phosphatase: 85 U/L (01-13-25 @ 00:26)         Patient is a 54y old  Male who presents with a chief complaint of CAD s/p CABG (17 Jan 2025 06:13)    Being followed by ID for        Interval history:  events reviewed - pt s/p trach yesterday  bronchoscopy continues to show much mucus in the LLL  No other acute events        PAST MEDICAL & SURGICAL HISTORY:  Type 2 diabetes mellitus      Hypertension      End stage renal disease  started HD 2/2019 T, Th, Sat via right chest permacath      Bone spur  right shoulder- hx of - sx done      Anemia      Injury of right wrist  hx of at age 14      History of hyperkalemia  before HD- K-6.2 - to repeat in am of sx, pt had HD today      S/P arthroscopy of right shoulder  2010      History of vascular access device  right chest permacath - 2/2019      H/O right wrist surgery  tendons repair - at age 14      AV fistula      H/O ventral hernia repair      S/P cholecystectomy        Allergies    No Known Allergies    Intolerances      Antimicrobials:    meropenem  IVPB 500 milliGRAM(s) IV Intermittent every 24 hours  tobramycin for Nebulization 300 milliGRAM(s) Inhalation every 12 hours  vancomycin    Solution 125 milliGRAM(s) Oral every 12 hours    MEDICATIONS  (STANDING):  acetylcysteine 20%  Inhalation 4 milliLiter(s) Inhalation every 6 hours  albuterol/ipratropium for Nebulization 3 milliLiter(s) Nebulizer every 6 hours  aMIOdarone    Tablet   Oral   aMIOdarone    Tablet 200 milliGRAM(s) Oral daily  ascorbic acid 500 milliGRAM(s) Oral daily  aspirin  chewable 81 milliGRAM(s) Oral daily  atorvastatin 80 milliGRAM(s) Oral at bedtime  bisacodyl Suppository 10 milliGRAM(s) Rectal once  chlorhexidine 0.12% Liquid 15 milliLiter(s) Oral Mucosa every 12 hours  chlorhexidine 2% Cloths 1 Application(s) Topical daily  chlorhexidine 4% Liquid 1 Application(s) Topical <User Schedule>  dextrose 50% Injectable 50 milliLiter(s) IV Push every 15 minutes  DOBUTamine Infusion 1 MICROgram(s)/kG/Min (2.55 mL/Hr) IV Continuous <Continuous>  epoetin ignacia (EPOGEN) Injectable 54097 Unit(s) IV Push <User Schedule>  gabapentin 100 milliGRAM(s) Oral every 12 hours  heparin  Infusion 1300 Unit(s)/Hr (13 mL/Hr) IV Continuous <Continuous>  hydrALAZINE 10 milliGRAM(s) Oral every 8 hours  insulin lispro (ADMELOG) corrective regimen sliding scale   SubCutaneous every 6 hours  meropenem  IVPB 500 milliGRAM(s) IV Intermittent every 24 hours  Nephro-elicia 1 Tablet(s) Oral daily  pantoprazole  Injectable 40 milliGRAM(s) IV Push daily  sodium chloride 0.65% Nasal 1 Spray(s) Both Nostrils every 12 hours  sodium chloride 0.9%. 1000 milliLiter(s) (10 mL/Hr) IV Continuous <Continuous>  tobramycin for Nebulization 300 milliGRAM(s) Inhalation every 12 hours  traZODone 100 milliGRAM(s) Oral at bedtime  vancomycin    Solution 125 milliGRAM(s) Oral every 12 hours      Vital Signs Last 24 Hrs  T(C): 36.8 (01-17-25 @ 08:00), Max: 37.6 (01-16-25 @ 16:00)  T(F): 98.2 (01-17-25 @ 08:00), Max: 99.6 (01-16-25 @ 16:00)  HR: 96 (01-17-25 @ 11:00) (66 - 96)  BP: 142/70 (01-17-25 @ 00:00) (123/60 - 142/70)  BP(mean): 99 (01-17-25 @ 00:00) (86 - 99)  RR: 23 (01-17-25 @ 11:00) (13 - 27)  SpO2: 98% (01-17-25 @ 11:00) (97% - 100%)    Physical Exam:    Constitutional intubated    HEENT PERRLA EOMI,No pallor or icterus    NG tube    Neck trach    Chest occasional rhonchi    CVS  S1 S2     Abd soft BS normal No tenderness     Ext No cyanosis clubbing or edema    IV site no erythema tenderness or discharge    Joints no swelling or LOM      Lab Data:                          8.7    11.50 )-----------( 310      ( 17 Jan 2025 00:33 )             26.5       01-17    134[L]  |  96  |  52[H]  ----------------------------<  105[H]  4.3   |  22  |  5.09[H]    Ca    7.9[L]      17 Jan 2025 00:33  Phos  4.5     01-17  Mg     2.5     01-17    TPro  5.3[L]  /  Alb  2.6[L]  /  TBili  0.8  /  DBili  x   /  AST  23  /  ALT  12  /  AlkPhos  76  01-17              Bronchial  01-11-25   Culture is being performed.  --  --      Bronchial  01-10-25   Commensal manjit consistent with body site  --    No polymorphonuclear leukocytes seen per low power field  No squamous epithelial cells seen per low power field  No organisms seen per oil power field        WBC Count: 11.50 (01-17-25 @ 00:33)  WBC Count: 14.10 (01-16-25 @ 00:41)  WBC Count: 17.07 (01-15-25 @ 00:22)  WBC Count: 21.76 (01-14-25 @ 00:26)  WBC Count: 21.73 (01-13-25 @ 00:26)  WBC Count: 21.67 (01-12-25 @ 00:30)  WBC Count: 29.61 (01-11-25 @ 00:32)       Bilirubin Total: 0.8 mg/dL (01-17-25 @ 00:33)  Aspartate Aminotransferase (AST/SGOT): 23 U/L (01-17-25 @ 00:33)  Alanine Aminotransferase (ALT/SGPT): 12 U/L (01-17-25 @ 00:33)  Alkaline Phosphatase: 76 U/L (01-17-25 @ 00:33)    Bilirubin Total: 0.7 mg/dL (01-14-25 @ 00:26)  Aspartate Aminotransferase (AST/SGOT): 21 U/L (01-14-25 @ 00:26)  Alanine Aminotransferase (ALT/SGPT): 26 U/L (01-14-25 @ 00:26)  Alkaline Phosphatase: 85 U/L (01-14-25 @ 00:26)    Bilirubin Total: 1.0 mg/dL (01-13-25 @ 00:26)  Aspartate Aminotransferase (AST/SGOT): 23 U/L (01-13-25 @ 00:26)  Alanine Aminotransferase (ALT/SGPT): 36 U/L (01-13-25 @ 00:26)  Alkaline Phosphatase: 85 U/L (01-13-25 @ 00:26)      Culture - Bronchial (01.03.25 @ 05:51)    -  Meropenem: S <=1   -  Piperacillin/Tazobactam: R <=8   -  Tobramycin: S <=2   -  Trimethoprim/Sulfamethoxazole: R >2/38   Gram Stain:   Numerous polymorphonuclear leukocytes seen per low power field  Numerous Gram Negative Rods seen per oil power field   -  Ampicillin: R >16 These ampicillin results predict results for amoxicillin   -  Ampicillin/Sulbactam: R 8/4   -  Aztreonam: R >16   -  Cefazolin: R >16   -  Cefepime: R >16   -  Ceftriaxone: R >32   -  Ciprofloxacin: R >2   -  Ertapenem: S <=0.5   -  Gentamicin: S <=2   -  Imipenem: S <=1   -  Levofloxacin: R >4   Specimen Source: Bronchial   Culture Results:   Numerous Escherichia coli ESBL  Commensal manjit consistent with body site   Organism Identification: Escherichia coli ESBL   Organism: Escherichia coli ESBL   Method Type: LISA

## 2025-01-18 LAB
ALBUMIN SERPL ELPH-MCNC: 2.9 G/DL — LOW (ref 3.3–5)
ALP SERPL-CCNC: 87 U/L — SIGNIFICANT CHANGE UP (ref 40–120)
ALT FLD-CCNC: 14 U/L — SIGNIFICANT CHANGE UP (ref 10–45)
ANION GAP SERPL CALC-SCNC: 12 MMOL/L — SIGNIFICANT CHANGE UP (ref 5–17)
APTT BLD: 41 SEC — HIGH (ref 24.5–35.6)
AST SERPL-CCNC: 25 U/L — SIGNIFICANT CHANGE UP (ref 10–40)
BASE EXCESS BLDV CALC-SCNC: 2.2 MMOL/L — SIGNIFICANT CHANGE UP (ref -2–3)
BASOPHILS # BLD AUTO: 0.03 K/UL — SIGNIFICANT CHANGE UP (ref 0–0.2)
BASOPHILS NFR BLD AUTO: 0.3 % — SIGNIFICANT CHANGE UP (ref 0–2)
BILIRUB SERPL-MCNC: 0.6 MG/DL — SIGNIFICANT CHANGE UP (ref 0.2–1.2)
BUN SERPL-MCNC: 33 MG/DL — HIGH (ref 7–23)
CALCIUM SERPL-MCNC: 8.1 MG/DL — LOW (ref 8.4–10.5)
CHLORIDE SERPL-SCNC: 98 MMOL/L — SIGNIFICANT CHANGE UP (ref 96–108)
CO2 BLDV-SCNC: 29 MMOL/L — HIGH (ref 22–26)
CO2 SERPL-SCNC: 26 MMOL/L — SIGNIFICANT CHANGE UP (ref 22–31)
CREAT SERPL-MCNC: 4 MG/DL — HIGH (ref 0.5–1.3)
EGFR: 17 ML/MIN/1.73M2 — LOW
EGFR: 17 ML/MIN/1.73M2 — LOW
EOSINOPHIL # BLD AUTO: 0.4 K/UL — SIGNIFICANT CHANGE UP (ref 0–0.5)
EOSINOPHIL NFR BLD AUTO: 4.2 % — SIGNIFICANT CHANGE UP (ref 0–6)
FIBRINOGEN PPP-MCNC: 327 MG/DL — SIGNIFICANT CHANGE UP (ref 200–445)
GAS PNL BLDA: SIGNIFICANT CHANGE UP
GAS PNL BLDV: SIGNIFICANT CHANGE UP
GAS PNL BLDV: SIGNIFICANT CHANGE UP
GLUCOSE SERPL-MCNC: 128 MG/DL — HIGH (ref 70–99)
GRAM STN FLD: SIGNIFICANT CHANGE UP
HCO3 BLDV-SCNC: 27 MMOL/L — SIGNIFICANT CHANGE UP (ref 22–29)
HCT VFR BLD CALC: 26.9 % — LOW (ref 39–50)
HGB BLD-MCNC: 8.7 G/DL — LOW (ref 13–17)
HOROWITZ INDEX BLDV+IHG-RTO: 40 — SIGNIFICANT CHANGE UP
IMM GRANULOCYTES NFR BLD AUTO: 0.5 % — SIGNIFICANT CHANGE UP (ref 0–0.9)
INR BLD: 1.33 RATIO — HIGH (ref 0.85–1.16)
LYMPHOCYTES # BLD AUTO: 0.41 K/UL — LOW (ref 1–3.3)
LYMPHOCYTES # BLD AUTO: 4.3 % — LOW (ref 13–44)
MAGNESIUM SERPL-MCNC: 2.3 MG/DL — SIGNIFICANT CHANGE UP (ref 1.6–2.6)
MCHC RBC-ENTMCNC: 30.1 PG — SIGNIFICANT CHANGE UP (ref 27–34)
MCHC RBC-ENTMCNC: 32.3 G/DL — SIGNIFICANT CHANGE UP (ref 32–36)
MONOCYTES # BLD AUTO: 0.65 K/UL — SIGNIFICANT CHANGE UP (ref 0–0.9)
MONOCYTES NFR BLD AUTO: 6.8 % — SIGNIFICANT CHANGE UP (ref 2–14)
NEUTROPHILS # BLD AUTO: 8.07 K/UL — HIGH (ref 1.8–7.4)
NRBC # BLD: 0 /100 WBCS — SIGNIFICANT CHANGE UP (ref 0–0)
NRBC BLD-RTO: 0 /100 WBCS — SIGNIFICANT CHANGE UP (ref 0–0)
PCO2 BLDV: 43 MMHG — SIGNIFICANT CHANGE UP (ref 42–55)
PH BLDV: 7.41 — SIGNIFICANT CHANGE UP (ref 7.32–7.43)
PHOSPHATE SERPL-MCNC: 3.9 MG/DL — SIGNIFICANT CHANGE UP (ref 2.5–4.5)
PLATELET # BLD AUTO: 314 K/UL — SIGNIFICANT CHANGE UP (ref 150–400)
PO2 BLDV: 40 MMHG — SIGNIFICANT CHANGE UP (ref 25–45)
POTASSIUM SERPL-MCNC: 3.8 MMOL/L — SIGNIFICANT CHANGE UP (ref 3.5–5.3)
POTASSIUM SERPL-SCNC: 3.8 MMOL/L — SIGNIFICANT CHANGE UP (ref 3.5–5.3)
PROT SERPL-MCNC: 5.5 G/DL — LOW (ref 6–8.3)
PROTHROM AB SERPL-ACNC: 15.3 SEC — HIGH (ref 9.9–13.4)
RBC # BLD: 2.89 M/UL — LOW (ref 4.2–5.8)
RBC # FLD: 20 % — HIGH (ref 10.3–14.5)
SODIUM SERPL-SCNC: 136 MMOL/L — SIGNIFICANT CHANGE UP (ref 135–145)
SPECIMEN SOURCE: SIGNIFICANT CHANGE UP
WBC # BLD: 9.61 K/UL — SIGNIFICANT CHANGE UP (ref 3.8–10.5)
WBC # FLD AUTO: 9.61 K/UL — SIGNIFICANT CHANGE UP (ref 3.8–10.5)

## 2025-01-18 PROCEDURE — 99291 CRITICAL CARE FIRST HOUR: CPT | Mod: 25

## 2025-01-18 PROCEDURE — 71045 X-RAY EXAM CHEST 1 VIEW: CPT | Mod: 26

## 2025-01-18 PROCEDURE — G0545: CPT

## 2025-01-18 PROCEDURE — 31645 BRNCHSC W/THER ASPIR 1ST: CPT

## 2025-01-18 PROCEDURE — 99232 SBSQ HOSP IP/OBS MODERATE 35: CPT

## 2025-01-18 RX ORDER — HYDROMORPHONE/SOD CHLOR,ISO/PF 2 MG/10 ML
0.5 SYRINGE (ML) INJECTION ONCE
Refills: 0 | Status: DISCONTINUED | OUTPATIENT
Start: 2025-01-18 | End: 2025-01-18

## 2025-01-18 RX ORDER — CALCIUM GLUCONATE 20 MG/ML
1 INJECTION, SOLUTION INTRAVENOUS ONCE
Refills: 0 | Status: COMPLETED | OUTPATIENT
Start: 2025-01-18 | End: 2025-01-18

## 2025-01-18 RX ORDER — MEROPENEM 1 G/30ML
500 INJECTION INTRAVENOUS EVERY 24 HOURS
Refills: 0 | Status: DISCONTINUED | OUTPATIENT
Start: 2025-01-18 | End: 2025-01-19

## 2025-01-18 RX ORDER — PROPOFOL 10 MG/ML
6 INJECTION, EMULSION INTRAVENOUS ONCE
Refills: 0 | Status: COMPLETED | OUTPATIENT
Start: 2025-01-18 | End: 2025-01-18

## 2025-01-18 RX ORDER — PROPOFOL 10 MG/ML
20 INJECTION, EMULSION INTRAVENOUS
Qty: 500 | Refills: 0 | Status: DISCONTINUED | OUTPATIENT
Start: 2025-01-18 | End: 2025-01-18

## 2025-01-18 RX ADMIN — Medication 1 SPRAY(S): at 17:11

## 2025-01-18 RX ADMIN — Medication 2.5 MILLIGRAM(S): at 11:35

## 2025-01-18 RX ADMIN — Medication 1 SPRAY(S): at 06:35

## 2025-01-18 RX ADMIN — TOBRAMYCIN 300 MILLIGRAM(S): 300 SOLUTION RESPIRATORY (INHALATION) at 17:25

## 2025-01-18 RX ADMIN — Medication 2.5 MILLIGRAM(S): at 17:24

## 2025-01-18 RX ADMIN — Medication 0.5 MILLIGRAM(S): at 04:45

## 2025-01-18 RX ADMIN — ACETYLCYSTEINE 4 MILLILITER(S): 200 INHALANT RESPIRATORY (INHALATION) at 11:36

## 2025-01-18 RX ADMIN — GABAPENTIN 100 MILLIGRAM(S): 400 CAPSULE ORAL at 17:10

## 2025-01-18 RX ADMIN — TOBRAMYCIN 300 MILLIGRAM(S): 300 SOLUTION RESPIRATORY (INHALATION) at 05:21

## 2025-01-18 RX ADMIN — INSULIN LISPRO 2: 100 INJECTION, SOLUTION INTRAVENOUS; SUBCUTANEOUS at 06:39

## 2025-01-18 RX ADMIN — ACETYLCYSTEINE 4 MILLILITER(S): 200 INHALANT RESPIRATORY (INHALATION) at 23:09

## 2025-01-18 RX ADMIN — PROPOFOL 10.2 MICROGRAM(S)/KG/MIN: 10 INJECTION, EMULSION INTRAVENOUS at 06:15

## 2025-01-18 RX ADMIN — Medication 2.5 MILLIGRAM(S): at 05:20

## 2025-01-18 RX ADMIN — Medication 125 MILLIGRAM(S): at 17:10

## 2025-01-18 RX ADMIN — Medication 10 MILLIGRAM(S): at 06:33

## 2025-01-18 RX ADMIN — Medication 100 MILLIGRAM(S): at 22:02

## 2025-01-18 RX ADMIN — ACETYLCYSTEINE 4 MILLILITER(S): 200 INHALANT RESPIRATORY (INHALATION) at 05:21

## 2025-01-18 RX ADMIN — Medication 10 MILLIGRAM(S): at 20:24

## 2025-01-18 RX ADMIN — DEXMEDETOMIDINE HYDROCHLORIDE IN SODIUM CHLORIDE 10.6 MICROGRAM(S)/KG/HR: 4 INJECTION INTRAVENOUS at 04:33

## 2025-01-18 RX ADMIN — Medication 0.5 MILLIGRAM(S): at 14:00

## 2025-01-18 RX ADMIN — Medication 0.5 MILLIGRAM(S): at 13:34

## 2025-01-18 RX ADMIN — ACETYLCYSTEINE 4 MILLILITER(S): 200 INHALANT RESPIRATORY (INHALATION) at 17:24

## 2025-01-18 RX ADMIN — INSULIN LISPRO 2: 100 INJECTION, SOLUTION INTRAVENOUS; SUBCUTANEOUS at 12:22

## 2025-01-18 RX ADMIN — Medication 0.5 MILLIGRAM(S): at 04:30

## 2025-01-18 RX ADMIN — Medication 15 MILLILITER(S): at 06:33

## 2025-01-18 RX ADMIN — Medication 1 APPLICATION(S): at 22:02

## 2025-01-18 RX ADMIN — Medication 2.5 MILLIGRAM(S): at 23:08

## 2025-01-18 RX ADMIN — MEROPENEM 100 MILLIGRAM(S): 1 INJECTION INTRAVENOUS at 20:24

## 2025-01-18 RX ADMIN — ATORVASTATIN CALCIUM 80 MILLIGRAM(S): 80 TABLET, FILM COATED ORAL at 22:02

## 2025-01-18 RX ADMIN — PROPOFOL 6 MILLIGRAM(S): 10 INJECTION, EMULSION INTRAVENOUS at 04:45

## 2025-01-18 RX ADMIN — Medication 1 APPLICATION(S): at 06:41

## 2025-01-18 RX ADMIN — Medication 15 MILLILITER(S): at 17:10

## 2025-01-18 RX ADMIN — CALCIUM GLUCONATE 100 GRAM(S): 20 INJECTION, SOLUTION INTRAVENOUS at 12:20

## 2025-01-18 RX ADMIN — PROPOFOL 10.2 MICROGRAM(S)/KG/MIN: 10 INJECTION, EMULSION INTRAVENOUS at 04:31

## 2025-01-18 NOTE — PROGRESS NOTE ADULT - SUBJECTIVE AND OBJECTIVE BOX
Patient seen and examined at the bedside.    Remains critically ill on continuous ICU monitoring, at risk for life threatening decompensation.  All Labs, data reviewed. Plan of care discussed in length during multi-disciplinary ICU rounds.       Brief Summary:  53 yo M with multiple medical problems including CAD s/p PCI in April 2024 s/p CABG x 3 on 12/30/24  1/2: Intubated for hypoxia & left lung white out s/p awake bronch with removal of copious mucopurulent secretions  1/4: s/p IABP insertion, sepsis/septic shock due to E coli   1/15: Emergently reintubated.    24 Hour events:      Objective:  Vital Signs Last 24 Hrs  T(C): 37.4 (18 Jan 2025 04:00), Max: 37.5 (17 Jan 2025 11:20)  T(F): 99.3 (18 Jan 2025 04:00), Max: 99.5 (17 Jan 2025 11:20)  HR: 72 (18 Jan 2025 07:00) (71 - 96)  BP: --  BP(mean): --  RR: 22 (18 Jan 2025 07:00) (12 - 29)  SpO2: 100% (18 Jan 2025 07:00) (89% - 100%)    Parameters below as of 18 Jan 2025 06:54  Patient On (Oxygen Delivery Method): ventilator    Mode: AC/ CMV (Assist Control/ Continuous Mandatory Ventilation)  RR (machine): 22  FiO2: 40  PEEP: 10  PS: 15  ITime: 1  MAP: 15  PC: 15  PIP: 26              Physical Exam:   General: Alert and interactive, trach to vent.  Neurology: Oriented, following commands  Respiratory: Breath sounds bilaterally  CV: Sinus  Abdominal: Soft, Nontender  Extremities: Warm, well-perfused  Right arm tender, but stable  -------------------------------------------------------------------------------------------------------------------------------    Labs:                        8.7    9.61  )-----------( 314      ( 18 Jan 2025 00:51 )             26.9     01-18    136  |  98  |  33[H]  ----------------------------<  128[H]  3.8   |  26  |  4.00[H]    Ca    8.1[L]      18 Jan 2025 00:51  Phos  3.9     01-18  Mg     2.3     01-18    TPro  5.5[L]  /  Alb  2.9[L]  /  TBili  0.6  /  DBili  x   /  AST  25  /  ALT  14  /  AlkPhos  87  01-18    LIVER FUNCTIONS - ( 18 Jan 2025 00:51 )  Alb: 2.9 g/dL / Pro: 5.5 g/dL / ALK PHOS: 87 U/L / ALT: 14 U/L / AST: 25 U/L / GGT: x           PT/INR - ( 18 Jan 2025 00:51 )   PT: 15.3 sec;   INR: 1.33 ratio         PTT - ( 18 Jan 2025 00:51 )  PTT:41.0 sec  ABG - ( 18 Jan 2025 00:10 )  pH, Arterial: 7.48  pH, Blood: x     /  pCO2: 38    /  pO2: 130   / HCO3: 28    / Base Excess: 4.5   /  SaO2: 100.0     ALL MEDICATIONS   MEDICATIONS  (STANDING):  acetylcysteine 20%  Inhalation 4 milliLiter(s) Inhalation every 6 hours  albuterol    0.083% 2.5 milliGRAM(s) Nebulizer every 6 hours  aMIOdarone    Tablet   Oral   aMIOdarone    Tablet 200 milliGRAM(s) Oral daily  ascorbic acid 500 milliGRAM(s) Oral daily  aspirin  chewable 81 milliGRAM(s) Oral daily  atorvastatin 80 milliGRAM(s) Oral at bedtime  bisacodyl Suppository 10 milliGRAM(s) Rectal once  chlorhexidine 0.12% Liquid 15 milliLiter(s) Oral Mucosa every 12 hours  chlorhexidine 2% Cloths 1 Application(s) Topical daily  chlorhexidine 4% Liquid 1 Application(s) Topical <User Schedule>  dexMEDEtomidine Infusion 0.5 MICROgram(s)/kG/Hr (10.6 mL/Hr) IV Continuous <Continuous>  dextrose 50% Injectable 50 milliLiter(s) IV Push every 15 minutes  DOBUTamine Infusion 1 MICROgram(s)/kG/Min (2.55 mL/Hr) IV Continuous <Continuous>  epoetin ignacia (EPOGEN) Injectable 44919 Unit(s) IV Push <User Schedule>  gabapentin 100 milliGRAM(s) Oral every 12 hours  hydrALAZINE 10 milliGRAM(s) Oral every 8 hours  insulin lispro (ADMELOG) corrective regimen sliding scale   SubCutaneous every 6 hours  meropenem  IVPB 500 milliGRAM(s) IV Intermittent every 24 hours  Nephro-elicia 1 Tablet(s) Oral daily  pantoprazole  Injectable 40 milliGRAM(s) IV Push daily  propofol Infusion 20 MICROgram(s)/kG/Min (10.2 mL/Hr) IV Continuous <Continuous>  sodium chloride 0.65% Nasal 1 Spray(s) Both Nostrils every 12 hours  sodium chloride 0.9%. 1000 milliLiter(s) (10 mL/Hr) IV Continuous <Continuous>  tobramycin for Nebulization 300 milliGRAM(s) Inhalation every 12 hours  traZODone 100 milliGRAM(s) Oral at bedtime  vancomycin    Solution 125 milliGRAM(s) Oral every 12 hours    MEDICATIONS  (PRN):  benzocaine 20% Spray 1 Spray(s) Topical every 6 hours PRN throat pain  lidocaine/prilocaine Cream 1 Application(s) Topical daily PRN pre-HD  sodium chloride 0.9% lock flush 10 milliLiter(s) IV Push every 1 hour PRN Pre/post blood products, medications, blood draw, and to maintain line patency    ------------------------------------------------------------------------------------------------------------------------------  Assessment:  53 yo M s/p CABG x 3 on 12/30/24    Postop acute respiratory failure  Acute blood loss anemia  Ascites    ESRD on iHD   E coli bacteremia 2/2 pneumonia  Leukocytosis     Plan:  ***Neuro***  Postoperative acute pain control with Gabapentin and prns.  Trazodone nightly with Precedex & Propofol overnight.   PT ongoing.    ***Cardiovascular***  s/p CABG x 3  Remains on low dose Dobutamine - will wean slightly.  A fib s/p cardioversion - Amiodarone daily.  Hydralazine for hypertension.  ASA / Statin daily    ***Pulmonary***  Postoperative acute respiratory failure  Tracheostomy yesterday - will start trach collar trials today.  Bronchoscopy other day.    ***GI***  On Tube feeds.  Protonix for stress ulcer prophylaxis.   Ascites: Last paracentesis 1/11    ***Renal***  ESRD - last HD 1/15.  LUE AV fistula     ***ID***  Sepsis/septic shock due to ESBL E. coli - Continue Meropenem. Trend leukocytosis   PO Vancomycin for C diff prophylaxis   Mccauley stain negative.  Sacral wound - continue wound care, may benefit from a VAC    ***Endocrine***  Hyperglycemia - Insulin sliding scale    ***Hematology***  Acute blood loss anemia  No current transfusion indication, trend CBC and monitor for bleeding.  Resume Heparin infusion for recent A fib.  Continue on Epogen.          I, Stella Hodges MD, personally performed the services described in this documentation. all medical record entries made by the scribe were at my direction and in my presence. I have reviewed the chart and agree that the record reflects my personal performance and is accurate and complete  Electronically signed:   Stella Hodges MD  CT ICU attending     ICU time: ** mins     By signing my name below, I, Gustavo Hudson, attest that this documentation has been prepared under the direction and in the presence of Stella Hodges MD  Electronically signed: Gustavo Hudson, 01-18-25 @ 08:13             Patient seen and examined at the bedside.    Remains critically ill on continuous ICU monitoring, at risk for life threatening decompensation.  All Labs, data reviewed. Plan of care discussed in length during multi-disciplinary ICU rounds.       Brief Summary:  53 yo M with multiple medical problems including CAD s/p PCI in April 2024 s/p CABG x 3 on 12/30/24  1/2: Intubated for hypoxia & left lung white out s/p awake bronch with removal of copious mucopurulent secretions  1/4: s/p IABP insertion, sepsis/septic shock due to E coli   1/15: Emergently reintubated.    24 Hour events:  Tolerated HD session yesterday    Objective:  Vital Signs Last 24 Hrs  T(C): 37.4 (18 Jan 2025 04:00), Max: 37.5 (17 Jan 2025 11:20)  T(F): 99.3 (18 Jan 2025 04:00), Max: 99.5 (17 Jan 2025 11:20)  HR: 72 (18 Jan 2025 07:00) (71 - 96)  BP: --  BP(mean): --  RR: 22 (18 Jan 2025 07:00) (12 - 29)  SpO2: 100% (18 Jan 2025 07:00) (89% - 100%)    Parameters below as of 18 Jan 2025 06:54  Patient On (Oxygen Delivery Method): ventilator    Mode: AC/ CMV (Assist Control/ Continuous Mandatory Ventilation)  RR (machine): 22  FiO2: 40  PEEP: 10  PS: 15  ITime: 1  MAP: 15  PC: 15  PIP: 26              Physical Exam:   General: Alert and interactive, trach to vent.  Neurology: Oriented, following commands. Currently on sedation  Respiratory: Breath sounds bilaterally  CV: Sinus  Abdominal: Soft, Nontender  Extremities: Warm, well-perfused  Right arm tender, but stable  -------------------------------------------------------------------------------------------------------------------------------    Labs:                        8.7    9.61  )-----------( 314      ( 18 Jan 2025 00:51 )             26.9     01-18    136  |  98  |  33[H]  ----------------------------<  128[H]  3.8   |  26  |  4.00[H]    Ca    8.1[L]      18 Jan 2025 00:51  Phos  3.9     01-18  Mg     2.3     01-18    TPro  5.5[L]  /  Alb  2.9[L]  /  TBili  0.6  /  DBili  x   /  AST  25  /  ALT  14  /  AlkPhos  87  01-18    LIVER FUNCTIONS - ( 18 Jan 2025 00:51 )  Alb: 2.9 g/dL / Pro: 5.5 g/dL / ALK PHOS: 87 U/L / ALT: 14 U/L / AST: 25 U/L / GGT: x           PT/INR - ( 18 Jan 2025 00:51 )   PT: 15.3 sec;   INR: 1.33 ratio         PTT - ( 18 Jan 2025 00:51 )  PTT:41.0 sec  ABG - ( 18 Jan 2025 00:10 )  pH, Arterial: 7.48  pH, Blood: x     /  pCO2: 38    /  pO2: 130   / HCO3: 28    / Base Excess: 4.5   /  SaO2: 100.0     ALL MEDICATIONS   MEDICATIONS  (STANDING):  acetylcysteine 20%  Inhalation 4 milliLiter(s) Inhalation every 6 hours  albuterol    0.083% 2.5 milliGRAM(s) Nebulizer every 6 hours  aMIOdarone    Tablet   Oral   aMIOdarone    Tablet 200 milliGRAM(s) Oral daily  ascorbic acid 500 milliGRAM(s) Oral daily  aspirin  chewable 81 milliGRAM(s) Oral daily  atorvastatin 80 milliGRAM(s) Oral at bedtime  bisacodyl Suppository 10 milliGRAM(s) Rectal once  chlorhexidine 0.12% Liquid 15 milliLiter(s) Oral Mucosa every 12 hours  chlorhexidine 2% Cloths 1 Application(s) Topical daily  chlorhexidine 4% Liquid 1 Application(s) Topical <User Schedule>  dexMEDEtomidine Infusion 0.5 MICROgram(s)/kG/Hr (10.6 mL/Hr) IV Continuous <Continuous>  dextrose 50% Injectable 50 milliLiter(s) IV Push every 15 minutes  DOBUTamine Infusion 1 MICROgram(s)/kG/Min (2.55 mL/Hr) IV Continuous <Continuous>  epoetin ignacia (EPOGEN) Injectable 83121 Unit(s) IV Push <User Schedule>  gabapentin 100 milliGRAM(s) Oral every 12 hours  hydrALAZINE 10 milliGRAM(s) Oral every 8 hours  insulin lispro (ADMELOG) corrective regimen sliding scale   SubCutaneous every 6 hours  meropenem  IVPB 500 milliGRAM(s) IV Intermittent every 24 hours  Nephro-elicia 1 Tablet(s) Oral daily  pantoprazole  Injectable 40 milliGRAM(s) IV Push daily  propofol Infusion 20 MICROgram(s)/kG/Min (10.2 mL/Hr) IV Continuous <Continuous>  sodium chloride 0.65% Nasal 1 Spray(s) Both Nostrils every 12 hours  sodium chloride 0.9%. 1000 milliLiter(s) (10 mL/Hr) IV Continuous <Continuous>  tobramycin for Nebulization 300 milliGRAM(s) Inhalation every 12 hours  traZODone 100 milliGRAM(s) Oral at bedtime  vancomycin    Solution 125 milliGRAM(s) Oral every 12 hours    MEDICATIONS  (PRN):  benzocaine 20% Spray 1 Spray(s) Topical every 6 hours PRN throat pain  lidocaine/prilocaine Cream 1 Application(s) Topical daily PRN pre-HD  sodium chloride 0.9% lock flush 10 milliLiter(s) IV Push every 1 hour PRN Pre/post blood products, medications, blood draw, and to maintain line patency    ------------------------------------------------------------------------------------------------------------------------------  Assessment:  53 yo M s/p CABG x 3 on 12/30/24    Postop acute respiratory failure  Acute blood loss anemia  Ascites    ESRD on iHD   E coli bacteremia 2/2 pneumonia  Leukocytosis     Plan:  ***Neuro***  Postoperative acute pain control with Gabapentin and prns.  Trazodone nightly with Precedex & Propofol overnight.   PT ongoing.    ***Cardiovascular***  s/p CABG x 3  Remains on low dose Dobutamine - will wean slightly.  A fib s/p cardioversion - Amiodarone daily.  Hydralazine for hypertension.  ASA / Statin daily    ***Pulmonary***  Postoperative acute respiratory failure  Tracheostomy 1/16 - trach collar trials.  s/p Bronchoscopy 1/16    ***GI***  On Tube feeds.  Protonix for stress ulcer prophylaxis.   Ascites: Last paracentesis 1/11    ***Renal***  ESRD - last HD 1/17.  LUE AV fistula     ***ID***  Sepsis/septic shock due to ESBL E. coli - Continue Meropenem. Trend leukocytosis   PO Vancomycin for C diff prophylaxis   Mccauley stain negative.  Sacral wound - continue wound care, may benefit from a VAC    ***Endocrine***  Hyperglycemia - Insulin sliding scale    ***Hematology***  Acute blood loss anemia  No current transfusion indication, trend CBC and monitor for bleeding.  Resume Heparin infusion for recent A fib.  Continue on Epogen.          I, Stella Hodges MD, personally performed the services described in this documentation. all medical record entries made by the scribe were at my direction and in my presence. I have reviewed the chart and agree that the record reflects my personal performance and is accurate and complete  Electronically signed:   Stella Hodges MD  CT ICU attending     ICU time: ** mins     By signing my name below, I, Gustavo Hudson, attest that this documentation has been prepared under the direction and in the presence of Stella Hodges MD  Electronically signed: Gustavo Hudson, 01-18-25 @ 08:13             Patient seen and examined at the bedside.    Remains critically ill on continuous ICU monitoring, at risk for life threatening decompensation.  All Labs, data reviewed. Plan of care discussed in length during multi-disciplinary ICU rounds.       Brief Summary:  55 yo M with multiple medical problems including CAD s/p PCI in 2024 s/p CABG x 3 on 24  1: Intubated for hypoxia & left lung white out s/p awake bronch with removal of copious mucopurulent secretions  : s/p IABP insertion, sepsis/septic shock due to E coli   1/15: Emergently reintubated.  : HD and 2.5L UF removal      24 Hour events:  trach site bleed overnight  bronch and blood clot removed from left bronchus   @1. d/c  today and repeat 2decho  monitor trach site  TF increase to goal  HD tomorrow  on full vent support--> PS 15/8//40% and PS throughout day as tolerated to  recruit the lung      Objective:  Vital Signs Last 24 Hrs  T(C): 37.4 (2025 04:00), Max: 37.5 (2025 11:20)  T(F): 99.3 (2025 04:00), Max: 99.5 (2025 11:20)  HR: 72 (2025 07:00) (71 - 96)  BP: --  BP(mean): --  RR: 22 (2025 07:00) (12 - 29)  SpO2: 100% (2025 07:00) (89% - 100%)    Parameters below as of 2025 06:54  Patient On (Oxygen Delivery Method): ventilator    Mode: AC/ CMV (Assist Control/ Continuous Mandatory Ventilation)  RR (machine): 22  FiO2: 40  PEEP: 10  PS: 15  ITime: 1  MAP: 15  PC: 15  PIP: 26              Physical Exam:   General: Alert and interactive, trach to vent.  Neurology: Oriented, following commands. Currently on sedation  Respiratory: Breath sounds bilaterally  CV: Sinus  Abdominal: Soft, Nontender  Extremities: Warm, well-perfused  Right arm tender, but stable  -------------------------------------------------------------------------------------------------------------------------------    Labs:                        8.7    9.61  )-----------( 314      ( 2025 00:51 )             26.9     01-18    136  |  98  |  33[H]  ----------------------------<  128[H]  3.8   |  26  |  4.00[H]    Ca    8.1[L]      2025 00:51  Phos  3.9       Mg     2.3         TPro  5.5[L]  /  Alb  2.9[L]  /  TBili  0.6  /  DBili  x   /  AST  25  /  ALT  14  /  AlkPhos  87  -18    LIVER FUNCTIONS - ( 2025 00:51 )  Alb: 2.9 g/dL / Pro: 5.5 g/dL / ALK PHOS: 87 U/L / ALT: 14 U/L / AST: 25 U/L / GGT: x           PT/INR - ( 2025 00:51 )   PT: 15.3 sec;   INR: 1.33 ratio         PTT - ( 2025 00:51 )  PTT:41.0 sec  ABG - ( 2025 00:10 )  pH, Arterial: 7.48  pH, Blood: x     /  pCO2: 38    /  pO2: 130   / HCO3: 28    / Base Excess: 4.5   /  SaO2: 100.0     ALL MEDICATIONS   MEDICATIONS  (STANDING):  acetylcysteine 20%  Inhalation 4 milliLiter(s) Inhalation every 6 hours  albuterol    0.083% 2.5 milliGRAM(s) Nebulizer every 6 hours  aMIOdarone    Tablet   Oral   aMIOdarone    Tablet 200 milliGRAM(s) Oral daily  ascorbic acid 500 milliGRAM(s) Oral daily  aspirin  chewable 81 milliGRAM(s) Oral daily  atorvastatin 80 milliGRAM(s) Oral at bedtime  bisacodyl Suppository 10 milliGRAM(s) Rectal once  chlorhexidine 0.12% Liquid 15 milliLiter(s) Oral Mucosa every 12 hours  chlorhexidine 2% Cloths 1 Application(s) Topical daily  chlorhexidine 4% Liquid 1 Application(s) Topical <User Schedule>  dexMEDEtomidine Infusion 0.5 MICROgram(s)/kG/Hr (10.6 mL/Hr) IV Continuous <Continuous>  dextrose 50% Injectable 50 milliLiter(s) IV Push every 15 minutes  DOBUTamine Infusion 1 MICROgram(s)/kG/Min (2.55 mL/Hr) IV Continuous <Continuous>  epoetin ignacia (EPOGEN) Injectable 40894 Unit(s) IV Push <User Schedule>  gabapentin 100 milliGRAM(s) Oral every 12 hours  hydrALAZINE 10 milliGRAM(s) Oral every 8 hours  insulin lispro (ADMELOG) corrective regimen sliding scale   SubCutaneous every 6 hours  meropenem  IVPB 500 milliGRAM(s) IV Intermittent every 24 hours  Nephro-elicia 1 Tablet(s) Oral daily  pantoprazole  Injectable 40 milliGRAM(s) IV Push daily  propofol Infusion 20 MICROgram(s)/kG/Min (10.2 mL/Hr) IV Continuous <Continuous>  sodium chloride 0.65% Nasal 1 Spray(s) Both Nostrils every 12 hours  sodium chloride 0.9%. 1000 milliLiter(s) (10 mL/Hr) IV Continuous <Continuous>  tobramycin for Nebulization 300 milliGRAM(s) Inhalation every 12 hours  traZODone 100 milliGRAM(s) Oral at bedtime  vancomycin    Solution 125 milliGRAM(s) Oral every 12 hours    MEDICATIONS  (PRN):  benzocaine 20% Spray 1 Spray(s) Topical every 6 hours PRN throat pain  lidocaine/prilocaine Cream 1 Application(s) Topical daily PRN pre-HD  sodium chloride 0.9% lock flush 10 milliLiter(s) IV Push every 1 hour PRN Pre/post blood products, medications, blood draw, and to maintain line patency    ------------------------------------------------------------------------------------------------------------------------------  Assessment:  55 yo M s/p CABG x 3 on 24    Postop acute respiratory failure  Acute blood loss anemia  Ascites    ESRD on iHD   E coli bacteremia 2/2 pneumonia  Leukocytosis     Plan:  ***Neuro***  Postoperative acute pain control with Gabapentin and prns.  Trazodone nightly with Precedex   PT ongoing  d/c precedex during the day    ***Cardiovascular***  s/p CABG x 3  Remains on low dose Dobutamine 1 --> d/c today  A fib s/p cardioversion - Amiodarone daily.  Hydralazine for hypertension.  ASA / Statin daily    ***Pulmonary***  Postoperative acute respiratory failure  Tracheostomy    s/p Bronchoscopy   PS trials for recruitment    ***GI***  On Tube feeds.  Protonix for stress ulcer prophylaxis.   Ascites: Last paracentesis     ***Renal***  ESRD - last HD : -2.5L UF (MWF)  LUE AV fistula     ***ID***  Sepsis/septic shock due to ESBL E. coli - Continue Meropenem. inhaled mary  Trend leukocytosis   PO Vancomycin for C diff prophylaxis   Mccauley stain negative.  Sacral wound - continue wound care, may benefit from a VAC    ***Endocrine***  Hyperglycemia - Insulin sliding scale    ***Hematology***  Acute blood loss anemia  No current transfusion indication, trend CBC and monitor for bleeding.  A fib and DCCV and on heparin gtt since : held on  for trach bleeding  SCD's  Continue on Epogen.        I, Stella Hodges MD, personally performed the services described in this documentation. all medical record entries made by the scribe were at my direction and in my presence. I have reviewed the chart and agree that the record reflects my personal performance and is accurate and complete  Electronically signed:   Stella Hodges MD  CT ICU attending     ICU time: 53mins     By signing my name below, I, Gustavo Hudson, attest that this documentation has been prepared under the direction and in the presence of Stella Hodges MD  Electronically signed: Gustavo Hudson, 25 @ 08:13

## 2025-01-18 NOTE — PROGRESS NOTE ADULT - SUBJECTIVE AND OBJECTIVE BOX
[facility]  Division of Infectious Diseases  [phone]    Name: ALAINA CANO  Age: 54y  Gender: Male  MRN: [id number]    Interval History--  Notes reviewed.     Past Medical History--  Type 2 diabetes mellitus    Hypertension    HLD (hyperlipidemia)    End stage renal disease    Bone spur    Anemia    Injury of right wrist    History of hyperkalemia    No pertinent past medical history    S/P arthroscopy of right shoulder    History of vascular access device    H/O right wrist surgery    AV fistula    H/O ventral hernia repair    S/P cholecystectomy        For details regarding the patient's social history, family history, and other miscellaneous elements, please refer the initial infectious diseases consultation and/or the admitting history and physical examination for this admission.    Allergies    No Known Allergies    Intolerances        Medications--  Antibiotics:  meropenem  IVPB 500 milliGRAM(s) IV Intermittent every 24 hours  tobramycin for Nebulization 300 milliGRAM(s) Inhalation every 12 hours  vancomycin    Solution 125 milliGRAM(s) Oral every 12 hours    Immunologic:  epoetin ignacia (EPOGEN) Injectable 67046 Unit(s) IV Push <User Schedule>    Other:  acetylcysteine 20%  Inhalation  albuterol    0.083%  aMIOdarone    Tablet  aMIOdarone    Tablet  ascorbic acid  aspirin  chewable  atorvastatin  benzocaine 20% Spray PRN  bisacodyl Suppository  chlorhexidine 0.12% Liquid  chlorhexidine 2% Cloths  chlorhexidine 4% Liquid  dexMEDEtomidine Infusion  dextrose 50% Injectable  DOBUTamine Infusion  gabapentin  hydrALAZINE  insulin lispro (ADMELOG) corrective regimen sliding scale  lidocaine/prilocaine Cream PRN  Nephro-elicia  pantoprazole  Injectable  propofol Infusion  sodium chloride 0.65% Nasal  sodium chloride 0.9% lock flush PRN  sodium chloride 0.9%.  traZODone      Review of Systems--  A 10-point review of systems was obtained.     Pertinent positives and negatives--  Constitutional: No fevers. No Chills. No Rigors.   Cardiovascular: No chest pain. No palpitations.  Respiratory: No shortness of breath. No cough.  Gastrointestinal: No nausea or vomiting. No diarrhea or constipation.   Psychiatric: Pleasant. Appropriate affect.    Review of systems otherwise negative except as previously noted.    Physical Examination--  Vital Signs: T(F): 99 (01-18-25 @ 00:00), Max: 99.5 (01-17-25 @ 11:20)  HR: 72 (01-18-25 @ 06:00)  BP: --  RR: 23 (01-18-25 @ 06:00)  SpO2: 100% (01-18-25 @ 06:00)  Wt(kg): --  General: Nontoxic-appearing Male in no acute distress.  HEENT: AT/NC. PERRL. EOMI. Anicteric. Conjunctiva pink and moist. Oropharynx clear. Dentition fair.  Neck: Not rigid. No sense of mass.  Nodes: None palpable.  Lungs: Clear bilaterally without rales, wheezing or rhonchi  Heart: Regular rate and rhythm. No Murmur. No rub. No gallop. No palpable thrill.  Abdomen: Bowel sounds present and normoactive. Soft. Nondistended. Nontender. No sense of mass. No organomegaly.  Back: No spinal tenderness. No costovertebral angle tenderness.   Extremities: No cyanosis or clubbing. No edema.   Skin: Warm. Dry. Good turgor. No rash. No vasculitic stigmata.  Psychiatric: Appropriate affect and mood for situation.         Laboratory Studies--  CBC                        8.7    9.61  )-----------( 314      ( 18 Jan 2025 00:51 )             26.9       Chemistries  01-18    136  |  98  |  33[H]  ----------------------------<  128[H]  3.8   |  26  |  4.00[H]    Ca    8.1[L]      18 Jan 2025 00:51  Phos  3.9     01-18  Mg     2.3     01-18    TPro  5.5[L]  /  Alb  2.9[L]  /  TBili  0.6  /  DBili  x   /  AST  25  /  ALT  14  /  AlkPhos  87  01-18      Culture Data    Culture - Acid Fast - Bronchial w/Smear (collected 11 Jan 2025 06:58)  Source: Bronchial  Preliminary Report (15 Aquilino 2025 23:06):    Culture is being performed.             [facility]  Division of Infectious Diseases  [phone]    Name: ALAINA CANO  Age: 54y  Gender: Male  MRN: [id number]    Covering Dr. MELODIE Champion.     Interval History--  Notes reviewed. D/W patient's RN. Overnight developed leedign sround tracheostomy site, but not suctioning any bloody sputum (at most trace blood). Remains vent dependent, sedated.     Past Medical History--  Type 2 diabetes mellitus    Hypertension    HLD (hyperlipidemia)    End stage renal disease    Bone spur    Anemia    Injury of right wrist    History of hyperkalemia    No pertinent past medical history    S/P arthroscopy of right shoulder    History of vascular access device    H/O right wrist surgery    AV fistula    H/O ventral hernia repair    S/P cholecystectomy        For details regarding the patient's social history, family history, and other miscellaneous elements, please refer the initial infectious diseases consultation and/or the admitting history and physical examination for this admission.    Allergies    No Known Allergies    Intolerances        Medications--  Antibiotics:  meropenem  IVPB 500 milliGRAM(s) IV Intermittent every 24 hours  tobramycin for Nebulization 300 milliGRAM(s) Inhalation every 12 hours  vancomycin    Solution 125 milliGRAM(s) Oral every 12 hours    Immunologic:  epoetin ignacia (EPOGEN) Injectable 30784 Unit(s) IV Push <User Schedule>    Other:  acetylcysteine 20%  Inhalation  albuterol    0.083%  aMIOdarone    Tablet  aMIOdarone    Tablet  ascorbic acid  aspirin  chewable  atorvastatin  benzocaine 20% Spray PRN  bisacodyl Suppository  chlorhexidine 0.12% Liquid  chlorhexidine 2% Cloths  chlorhexidine 4% Liquid  dexMEDEtomidine Infusion  dextrose 50% Injectable  DOBUTamine Infusion  gabapentin  hydrALAZINE  insulin lispro (ADMELOG) corrective regimen sliding scale  lidocaine/prilocaine Cream PRN  Nephro-elicia  pantoprazole  Injectable  propofol Infusion  sodium chloride 0.65% Nasal  sodium chloride 0.9% lock flush PRN  sodium chloride 0.9%.  traZODone      Review of Systems--  A 10-point review of systems was obtained.   Review of systems otherwise negative except as previously noted.    Physical Examination--  Vital Signs: T(F): 99 (01-18-25 @ 00:00), Max: 99.5 (01-17-25 @ 11:20)  HR: 72 (01-18-25 @ 06:00)  BP: --  RR: 23 (01-18-25 @ 06:00)  SpO2: 100% (01-18-25 @ 06:00)  Wt(kg): --  General: Nontoxic-appearing Male in no acute distress.  HEENT: AT/NC. Anicteric. Conjunctiva pink and moist.   Neck: Not rigid. No sense of mass. RIJ CVl CDI. Trach site bloody.   Nodes: None palpable.  Lungs: Coarse B BS  Heart: Regular rate and rhythm.  Abdomen: Bowel sounds present and normoactive. Soft. Nondistended. Nontender.  Back: Unable   Extremities: No cyanosis or clubbing. No edema. Somewhat wasted.   Skin: Cool.  Dry. Good turgor. No rash. No vasculitic stigmata.  Psychiatric: Unable       Laboratory Studies--  CBC                        8.7    9.61  )-----------( 314      ( 18 Jan 2025 00:51 )             26.9       Chemistries  01-18    136  |  98  |  33[H]  ----------------------------<  128[H]  3.8   |  26  |  4.00[H]    Ca    8.1[L]      18 Jan 2025 00:51  Phos  3.9     01-18  Mg     2.3     01-18    TPro  5.5[L]  /  Alb  2.9[L]  /  TBili  0.6  /  DBili  x   /  AST  25  /  ALT  14  /  AlkPhos  87  01-18        Culture Data    Culture - Acid Fast - Bronchial w/Smear (collected 11 Jan 2025 06:58)  Source: Bronchial  Preliminary Report (15 Aquilino 2025 23:06):    Culture is being performed.    Culture - Bronchial (collected 10 Aquilino 2025 18:06)  Source: Bronchial  Gram Stain (11 Jan 2025 06:06):    No polymorphonuclear leukocytes seen per low power field    No squamous epithelial cells seen per low power field    No organisms seen per oil power field  Final Report (12 Jan 2025 07:39):    Commensal manjit consistent with body site    Culture - Blood (collected 09 Jan 2025 16:30)  Source: .Blood BLOOD  Final Report (14 Jan 2025 19:01):    No growth at 5 days    Culture - Blood (collected 07 Jan 2025 14:08)  Source: .Blood BLOOD  Final Report (12 Jan 2025 18:01):    No growth at 5 days    Culture - Blood (collected 05 Jan 2025 19:50)  Source: .Blood BLOOD  Final Report (10 Aquilino 2025 23:01):    No growth at 5 days    Culture - Blood (collected 05 Jan 2025 18:20)  Source: .Blood BLOOD  Final Report (10 Aquilino 2025 22:01):    No growth at 5 days

## 2025-01-18 NOTE — PROGRESS NOTE ADULT - SUBJECTIVE AND OBJECTIVE BOX
General Surgery Progress Note    Overnight: RAINER  Subjective: Patient seen and examined on rounds. Some oozing around trach site, H/H stable, tolerating on vent.    Objective:  Vitals:  T(C): 37.4 (01-18-25 @ 08:00), Max: 37.5 (01-17-25 @ 11:20)  HR: 72 (01-18-25 @ 08:00) (71 - 96)  BP: --  RR: 22 (01-18-25 @ 08:00) (12 - 29)  SpO2: 100% (01-18-25 @ 08:00) (89% - 100%)  Wt(kg): --    01-17 @ 07:01 - 01-18 @ 07:00  --------------------------------------------------------  IN:    Albumin 25%  -  50 mL: 1 mL    Albumin 5%  - 250 mL: 5 mL    Dexmedetomidine: 134.1 mL    DOBUTamine: 5.1 mL    DOBUTamine: 59.8 mL    Enteral Tube Flush: 60 mL    Heparin: 247 mL    IV PiggyBack: 50 mL    Nepro with Carb Steady: 770 mL    Other (mL): 700 mL    Propofol: 51 mL    sodium chloride 0.9%: 120 mL  Total IN: 2203 mL    OUT:    Other (mL): 2700 mL  Total OUT: 2700 mL    Total NET: -497 mL      01-18 @ 07:01 - 01-18 @ 08:38  --------------------------------------------------------  IN:    Dexmedetomidine: 12.8 mL    DOBUTamine: 2.6 mL    Enteral Tube Flush: 20 mL    Nepro with Carb Steady: 30 mL    sodium chloride 0.9%: 5 mL  Total IN: 70.4 mL    OUT:    Propofol: 0 mL  Total OUT: 0 mL    Total NET: 70.4 mL          Physical Exam:  General: NAD, resting comfortably in bed  HEENT: Trach in place secured, some mild strikethrough on gauze with someone oozing noted from skin edge      Labs:                        8.7    9.61  )-----------( 314      ( 18 Jan 2025 00:51 )             26.9     01-18    136  |  98  |  33[H]  ----------------------------<  128[H]  3.8   |  26  |  4.00[H]    Ca    8.1[L]      18 Jan 2025 00:51  Phos  3.9     01-18  Mg     2.3     01-18    TPro  5.5[L]  /  Alb  2.9[L]  /  TBili  0.6  /  DBili  x   /  AST  25  /  ALT  14  /  AlkPhos  87  01-18    LIVER FUNCTIONS - ( 18 Jan 2025 00:51 )  Alb: 2.9 g/dL / Pro: 5.5 g/dL / ALK PHOS: 87 U/L / ALT: 14 U/L / AST: 25 U/L / GGT: x           PT/INR - ( 18 Jan 2025 00:51 )   PT: 15.3 sec;   INR: 1.33 ratio         PTT - ( 18 Jan 2025 00:51 )  PTT:41.0 sec  Urinalysis Basic - ( 18 Jan 2025 00:51 )    Color: x / Appearance: x / SG: x / pH: x  Gluc: 128 mg/dL / Ketone: x  / Bili: x / Urobili: x   Blood: x / Protein: x / Nitrite: x   Leuk Esterase: x / RBC: x / WBC x   Sq Epi: x / Non Sq Epi: x / Bacteria: x

## 2025-01-18 NOTE — PROGRESS NOTE ADULT - ASSESSMENT
54M with PMH of HTN, HLD, ESRD on HD MWF via LUE AVF, ascites (requiring repeat paracenteses q4-6wks), NICM with moderate LV dysfunction w/ EF 35-40%() and CAD s/p atherectomy + SALLIE to D1(2024) presents to [facility] transferred from [facility]. Patient initially presented to [facility] ED with shortness of breath. Of note he was recently admitted from - for acute cholecystitis s/p cholecystectomy. In [facility] ED, pt was hypoxic to 86% on RA, trop 1.7K, EKG no new changes, CXR fluid overload. Admitted for AHRF 2/2 fluid overload. Pt received HD on , ,  and . DAPT was resumed, TTE showed improvement in LVEF to 40-45%. Stress test was abnormal with 1mm inferior STD, reversible ischemia in the inferior wall and fixed defects in the lateral, anterior and apical walls with post stress EF of 24%.     Pt was then transferred to [facility] for cardiac cath which showed pLAD 70% ISR, mLCx 70%, D1 severe dz.     Patient now transferred to [facility] CTS Dr. Rivers for CABG eval.# CAD s/p NSTEMI  Hx CAD s/p PCI LAD   Presented with ADHF elevated troponins  Positive NST1-Cath- pLAD 70% ISR, mLCx 70%, D1 severe dz.   TTE EF 35% Severe TRWas on Plavix-p2y12- 321 been off Plavix since -POD#1-C3L free LIMA-LAD; SVG-Diag; WIV-iwhmIL-Hexrdvaqj on  Sinus rhythm,Amio for AF prophylaxis  -OOB off  Sinus rhythm   -POD#3-On /pressors-EF 25-30% RV failure with transaminitis-Sinus Voocto15/03-POD#4-Was intubated for hypoxia-gradual hypotension on /pressors had IABP inserted this morning  -POD#5-s/p IABP insertion, sepsis/septic shock due to GNR/ECOLI HD cath placed   Bronched yesterday 1/3 - minimal secretions with rust-tinged sputum     ESBL-E Coli bacteremia on meropenam    - POD#7 Atrial Fib ,remains intubated weaning IABP 1:3,off pressors on CRRT  -IABP removed.Remains on vent-Alex 10ppm,off Levo- CVP 10 / MAP 71 / Hct 25.6% / Lactate 1.7-Atrial Fibrillation  -Intubated on Alex 10ppm, gtt, BP better-AF~~90's on Amio-had bronch still febrile Tmax 12900/09-Extubated awake alert on HFNC AF,on Dobutrex-CRRT,Cultures no growth    01/10-OOB on BiPAP Sinus Rhythm on -SR/ MAP 57/ CVP 10/  Hct 26.7 / Lact 1.4  -s/p EDU/DCCV-Sinus rhythm on -SR / MAP 52 / CVP 12/ Hct 25.8 / Lact 0.7-had bronch with BAL Left lung  -Sinus rhythm on -for repeat Bronch-SR / MAP 67 / CVP 11 / Hct 27.4% / Lact 0.8  -s/p Trach on  TTE EF 55%-SR / CVP 2 / MAP 63 / Hct 25.9% / Lactate 0.9        # Acute on Chronic Systolic Heart Failure now improved  EF-35% ,severe TR  Had HD post op  GDMT post op  LVEF improved post bypass but now at 23-30%-BiV dysfunction on  now off pressors  -TTE EF 40%,trace effusion  ECG c/w pericarditis-was on colchicine   01/15-TTE EF 55%    Vascular evaluated  AVGraft /?steal causing RV failure-'' Very low suspicion of steal Sd and AVF causing current clinical state. ''   VA Duplex Hemodialysis Access, Left (25 @ 13:35) >   Arterial flow volume of 1301 mL/m, and the venous flow volume of  1492 mL/m are consistent with a normally functioning dialysis access  fistula.    # Ascites  Hx chronic ascites  Has drainage q4-6 weeks  Last drained -4600mL  ? ascites related to severe TR  Had vmnszovmcvdf-Durbsez-ye growth   CT Abdomen 01/10-Large volume ascites-consider paracentesis  Monitor      # ESRD  Now off CRRT transition to HD

## 2025-01-18 NOTE — PROGRESS NOTE ADULT - SUBJECTIVE AND OBJECTIVE BOX
PATIENT SEEN AND EXAMINED BY JEAN PAUL GABRIEL M.D. ON :- 1/18/25  DATE OF SERVICE:   1/18/25          Interim events noted,Labs ,Radiological studies and Cardiology tests reviewed .    Patient is a 54y old  Male who presents with a chief complaint of CAD s/p CABG (17 Jan 2025 06:13)      HPI:  54M with PMH of HTN, HLD, ESRD on HD MWF via LUE AVF, ascites (requiring repeat paracenteses q4-6wks), NICM with moderate LV dysfunction w/ EF 35-40%(2023) and CAD s/p atherectomy + SALLIE to D1(4/11/2024) presents to [facility] transferred from [facility]. Patient initially presented to [facility] ED with shortness of breath. Of note he was recently admitted from 09/27-12/02 for acute cholecystitis s/p cholecystectomy. In [facility] ED, pt was hypoxic to 86% on RA, trop 1.7K, EKG no new changes, CXR fluid overload. Admitted for AHRF 2/2 fluid overload. Pt received HD on 12/18, 12/19, 12/20 and 12/22. DAPT was resumed, TTE showed improvement in LVEF to 40-45%. Stress test was abnormal with 1mm inferior STD, reversible ischemia in the inferior wall and fixed defects in the lateral, anterior and apical walls with post stress EF of 24%. Pt was then transferred to [facility] for cardiac cath which showed pLAD 70% ISR, mLCx 70%, D1 severe dz. Patient now transferred to [facility] CTS Dr. Rivers for CABG eval. Patient denies any current SOB or chest pain on admission. Otherwise denies headaches, abdominal pain, urinary or bowel changes, fevers, chills, N/V/D, sick contacts.  (24 Dec 2024 05:07)      PAST MEDICAL & SURGICAL HISTORY:  Type 2 diabetes mellitus      Hypertension      End stage renal disease  started HD 2/2019 T, Th, Sat via right chest permacath      Bone spur  right shoulder- hx of - sx done      Anemia      Injury of right wrist  hx of at age 14      History of hyperkalemia  before HD- K-6.2 - to repeat in am of sx, pt had HD today      S/P arthroscopy of right shoulder  2010      History of vascular access device  right chest permacath - 2/2019      H/O right wrist surgery  tendons repair - at age 14      AV fistula      H/O ventral hernia repair      S/P cholecystectomy          PREVIOUS DIAGNOSTIC TESTING:      ECHO  FINDINGS:    STRESS  FINDINGS:    CATHETERIZATION  FINDINGS:    MEDICATIONS  (STANDING):  acetylcysteine 20%  Inhalation 4 milliLiter(s) Inhalation every 6 hours  albuterol    0.083% 2.5 milliGRAM(s) Nebulizer every 6 hours  aMIOdarone    Tablet   Oral   aMIOdarone    Tablet 200 milliGRAM(s) Oral daily  ascorbic acid 500 milliGRAM(s) Oral daily  aspirin  chewable 81 milliGRAM(s) Oral daily  atorvastatin 80 milliGRAM(s) Oral at bedtime  bisacodyl Suppository 10 milliGRAM(s) Rectal once  chlorhexidine 0.12% Liquid 15 milliLiter(s) Oral Mucosa every 12 hours  chlorhexidine 2% Cloths 1 Application(s) Topical daily  chlorhexidine 4% Liquid 1 Application(s) Topical <User Schedule>  dextrose 50% Injectable 50 milliLiter(s) IV Push every 15 minutes  epoetin ignacia (EPOGEN) Injectable 84898 Unit(s) IV Push <User Schedule>  gabapentin 100 milliGRAM(s) Oral every 12 hours  hydrALAZINE 10 milliGRAM(s) Oral every 8 hours  insulin lispro (ADMELOG) corrective regimen sliding scale   SubCutaneous every 6 hours  meropenem  IVPB 500 milliGRAM(s) IV Intermittent every 24 hours  Nephro-elicia 1 Tablet(s) Oral daily  pantoprazole  Injectable 40 milliGRAM(s) IV Push daily  sodium chloride 0.65% Nasal 1 Spray(s) Both Nostrils every 12 hours  sodium chloride 0.9%. 1000 milliLiter(s) (10 mL/Hr) IV Continuous <Continuous>  tobramycin for Nebulization 300 milliGRAM(s) Inhalation every 12 hours  traZODone 100 milliGRAM(s) Oral at bedtime  vancomycin    Solution 125 milliGRAM(s) Oral every 12 hours    MEDICATIONS  (PRN):  benzocaine 20% Spray 1 Spray(s) Topical every 6 hours PRN throat pain  lidocaine/prilocaine Cream 1 Application(s) Topical daily PRN pre-HD  sodium chloride 0.9% lock flush 10 milliLiter(s) IV Push every 1 hour PRN Pre/post blood products, medications, blood draw, and to maintain line patency      FAMILY HISTORY:  FH: hypertension  mother, father- alive    FH: type 2 diabetes  mother, father- alive        SOCIAL HISTORY:    CIGARETTES:    ALCOHOL:    REVIEW OF SYSTEMS:  CONSTITUTIONAL: No fever, weight loss, or fatigue  EYES: No eye pain, visual disturbances, or discharge  ENMT:  No difficulty hearing, tinnitus, vertigo; No sinus or throat pain  NECK: No pain or stiffness  RESPIRATORY: No cough, wheezing, chills or hemoptysis; No shortness of breath  CARDIOVASCULAR: No chest pain, palpitations, dizziness, or leg swelling  GASTROINTESTINAL: No abdominal or epigastric pain. No nausea, vomiting, or hematemesis; No diarrhea or constipation. No melena or hematochezia.  GENITOURINARY: No dysuria, frequency, hematuria, or incontinence  NEUROLOGICAL: No headaches, memory loss, loss of strength, numbness, or tremors  SKIN: No itching, burning, rashes, or lesions   LYMPH NODES: No enlarged glands  ENDOCRINE: No heat or cold intolerance; No hair loss  MUSCULOSKELETAL: No joint pain or swelling; No muscle, back, or extremity pain  PSYCHIATRIC: No depression, anxiety, mood swings, or difficulty sleeping  HEME/LYMPH: No easy bruising, or bleeding gums  ALLERY AND IMMUNOLOGIC: No hives or eczema    Vital Signs Last 24 Hrs  T(C): 37.4 (18 Jan 2025 20:00), Max: 37.4 (18 Jan 2025 04:00)  T(F): 99.3 (18 Jan 2025 20:00), Max: 99.3 (18 Jan 2025 04:00)  HR: 83 (18 Jan 2025 22:00) (67 - 88)  BP: --  BP(mean): --  RR: 19 (18 Jan 2025 22:00) (12 - 60)  SpO2: 99% (18 Jan 2025 22:00) (90% - 100%)    Parameters below as of 18 Jan 2025 20:00  Patient On (Oxygen Delivery Method): ventilator    O2 Concentration (%): 40      PHYSICAL EXAM:  GENERAL: NAD, well-groomed, well-developed  HEAD:  Atraumatic, Normocephalic  EYES: EOMI, PERRLA, conjunctiva and sclera clear  ENMT: No tonsillar erythema, exudates, or enlargement; Moist mucous membranes, Good dentition, No lesions  NECK: Supple, No JVD, Normal thyroid  NERVOUS SYSTEM:  Alert & Oriented X3, Good concentration; Motor Strength 5/5 B/L upper and lower extremities; DTRs 2+ intact and symmetric  CHEST/LUNG: Clear to percussion bilaterally; No rales, rhonchi, wheezing, or rubs  HEART: Regular rate and rhythm; No murmurs, rubs, or gallops  ABDOMEN: Soft, Nontender, Nondistended; Bowel sounds present  EXTREMITIES:  2+ Peripheral Pulses, No clubbing, cyanosis, or edema  LYMPH: No lymphadenopathy noted  SKIN: No rashes or lesions      INTERPRETATION OF TELEMETRY:    [facility]    [facility]     LABS:                        8.7    9.61  )-----------( 314      ( 18 Jan 2025 00:51 )             26.9     01-18    136  |  98  |  33[H]  ----------------------------<  128[H]  3.8   |  26  |  4.00[H]    Ca    8.1[L]      18 Jan 2025 00:51  Phos  3.9     01-18  Mg     2.3     01-18    TPro  5.5[L]  /  Alb  2.9[L]  /  TBili  0.6  /  DBili  x   /  AST  25  /  ALT  14  /  AlkPhos  87  01-18        PT/INR - ( 18 Jan 2025 00:51 )   PT: 15.3 sec;   INR: 1.33 ratio         PTT - ( 18 Jan 2025 00:51 )  PTT:41.0 sec  Urinalysis Basic - ( 18 Jan 2025 00:51 )    Color: x / Appearance: x / SG: x / pH: x  Gluc: 128 mg/dL / Ketone: x  / Bili: x / Urobili: x   Blood: x / Protein: x / Nitrite: x   Leuk Esterase: x / RBC: x / WBC x   Sq Epi: x / Non Sq Epi: x / Bacteria: x      Lipid Panel:   I&O's Summary    17 Jan 2025 07:01  -  18 Jan 2025 07:00  --------------------------------------------------------  IN: 2203 mL / OUT: 2700 mL / NET: -497 mL    18 Jan 2025 07:01  -  18 Jan 2025 22:47  --------------------------------------------------------  IN: 703.3 mL / OUT: 0 mL / NET: 703.3 mL        RADIOLOGY & ADDITIONAL STUDIES:    < from: TTE W or WO Ultrasound Enhancing Agent (01.15.25 @ 13:06) >  CONCLUSIONS:      1. Left ventricular systolic function is normal with an ejection fraction visually estimated at 55 %.   2. Small pericardial effusion with evidence of hemodynamic compromise (or echocardiographic evidence of cardiac tamponade): respiratory variation across the mitral valve E wave is not greater than 30%.   3. Bilateral pleural effusion noted.    < end of copied text >

## 2025-01-18 NOTE — PROGRESS NOTE ADULT - SUBJECTIVE AND OBJECTIVE BOX
[facility]   MD DREW VIZCARRA MD RUORU WONG, PA    TEL:  FROM 9 AM to 5 PM ---OFFICE: [phone]  AVAILABLE ON TEAMS    FROM 5 PM - 9 AM PLEASE CALL ANSWERING SERVICE: [phone]    RENAL FOLLOW UP NOTE--Date of Service 01-18-25 @ 10:00  --------------------------------------------------------------------------------  HPI:      Pt seen and examined at bedside.       PAST HISTORY  --------------------------------------------------------------------------------  No significant changes to PMH, PSH, FHx, SHx, unless otherwise noted    ALLERGIES & MEDICATIONS  --------------------------------------------------------------------------------  Allergies    No Known Allergies    Intolerances      Standing Inpatient Medications  acetylcysteine 20%  Inhalation 4 milliLiter(s) Inhalation every 6 hours  albuterol    0.083% 2.5 milliGRAM(s) Nebulizer every 6 hours  aMIOdarone    Tablet   Oral   aMIOdarone    Tablet 200 milliGRAM(s) Oral daily  ascorbic acid 500 milliGRAM(s) Oral daily  aspirin  chewable 81 milliGRAM(s) Oral daily  atorvastatin 80 milliGRAM(s) Oral at bedtime  bisacodyl Suppository 10 milliGRAM(s) Rectal once  chlorhexidine 0.12% Liquid 15 milliLiter(s) Oral Mucosa every 12 hours  chlorhexidine 2% Cloths 1 Application(s) Topical daily  chlorhexidine 4% Liquid 1 Application(s) Topical <User Schedule>  dexMEDEtomidine Infusion 0.5 MICROgram(s)/kG/Hr IV Continuous <Continuous>  dextrose 50% Injectable 50 milliLiter(s) IV Push every 15 minutes  DOBUTamine Infusion 1 MICROgram(s)/kG/Min IV Continuous <Continuous>  epoetin ignacia (EPOGEN) Injectable 55164 Unit(s) IV Push <User Schedule>  gabapentin 100 milliGRAM(s) Oral every 12 hours  hydrALAZINE 10 milliGRAM(s) Oral every 8 hours  insulin lispro (ADMELOG) corrective regimen sliding scale   SubCutaneous every 6 hours  meropenem  IVPB 500 milliGRAM(s) IV Intermittent every 24 hours  Nephro-elicia 1 Tablet(s) Oral daily  pantoprazole  Injectable 40 milliGRAM(s) IV Push daily  propofol Infusion 20 MICROgram(s)/kG/Min IV Continuous <Continuous>  sodium chloride 0.65% Nasal 1 Spray(s) Both Nostrils every 12 hours  sodium chloride 0.9%. 1000 milliLiter(s) IV Continuous <Continuous>  tobramycin for Nebulization 300 milliGRAM(s) Inhalation every 12 hours  traZODone 100 milliGRAM(s) Oral at bedtime  vancomycin    Solution 125 milliGRAM(s) Oral every 12 hours    PRN Inpatient Medications  benzocaine 20% Spray 1 Spray(s) Topical every 6 hours PRN  lidocaine/prilocaine Cream 1 Application(s) Topical daily PRN  sodium chloride 0.9% lock flush 10 milliLiter(s) IV Push every 1 hour PRN      REVIEW OF SYSTEMS  --------------------------------------------------------------------------------  General: no fever  MSK: no edema     VITALS/PHYSICAL EXAM  --------------------------------------------------------------------------------  T(C): 37.4 (01-18-25 @ 08:00), Max: 37.5 (01-17-25 @ 11:20)  HR: 67 (01-18-25 @ 09:36) (67 - 96)  BP: --112/67  RR: 22 (01-18-25 @ 09:00) (12 - 29)  SpO2: 100% (01-18-25 @ 09:36) (89% - 100%)  Wt(kg): --        01-17-25 @ 07:01  -  01-18-25 @ 07:00  --------------------------------------------------------  IN: 2203 mL / OUT: 2700 mL / NET: -497 mL    01-18-25 @ 07:01  -  01-18-25 @ 10:00  --------------------------------------------------------  IN: 116.5 mL / OUT: 0 mL / NET: 116.5 mL      Physical Exam:  	Constitutional: NAD  Neck: No JVD  Respiratory: CTAB, no wheezes, rales or rhonchi  Cardiovascular: S1, S2, RRR  Gastrointestinal: BS+, soft, NT/ND  Extremities: No peripheral edema  LABS/STUDIES  --------------------------------------------------------------------------------              8.7    9.61  >-----------<  314      [01-18-25 @ 00:51]              26.9     136  |  98  |  33  ----------------------------<  128      [01-18-25 @ 00:51]  3.8   |  26  |  4.00        Ca     8.1     [01-18-25 @ 00:51]      Mg     2.3     [01-18-25 @ 00:51]      Phos  3.9     [01-18-25 @ 00:51]    TPro  5.5  /  Alb  2.9  /  TBili  0.6  /  DBili  x   /  AST  25  /  ALT  14  /  AlkPhos  87  [01-18-25 @ 00:51]    PT/INR: PT 15.3 , INR 1.33       [01-18-25 @ 00:51]  PTT: 41.0       [01-18-25 @ 00:51]      Creatinine Trend:  SCr 4.00 [01-18 @ 00:51]  SCr 5.09 [01-17 @ 00:33]  SCr 3.74 [01-16 @ 00:41]  SCr 4.91 [01-15 @ 00:22]  SCr 3.81 [01-14 @ 00:26]    Urinalysis - [01-18-25 @ 00:51]      Color  / Appearance  / SG  / pH       Gluc 128 / Ketone   / Bili  / Urobili        Blood  / Protein  / Leuk Est  / Nitrite       RBC  / WBC  / Hyaline  / Gran  / Sq Epi  / Non Sq Epi  / Bacteria       Iron 29, TIBC 143, %sat 20      [12-19-24 @ 05:00]  Ferritin 904      [12-19-24 @ 05:00]  TSH 4.75      [12-24-24 @ 06:50]

## 2025-01-18 NOTE — PROGRESS NOTE ADULT - ASSESSMENT
54M with PMH of HTN, HLD, ESRD on HD MWF via LUE AVF, ascites (requiring repeat paracenteses q4-6wks), NICM with moderate LV dysfunction w/ EF 35-40%(2023) and CAD s/p atherectomy + SALLIE to D1(4/11/2024) presents to [facility] transferred from [facility] for CABG eval  Nephro on [facility] for HD    ESRD on HD   Regular schedule MWF   Access LUE AVF  Center [facility]  Nephrologist Dr. Bailey  Last HD on 12/24  S/p HD on 12/27 12/29, 12/30 for hyperkalemia and 12/31  2 L PUF On 01/02/2024  However hospital course now complicated by Cardiogenic shock now on multiple pressors,   CRRT initiated 01/03, off since 01/08   S/p PUF on 01/09   HD held on 01/10 due to instability,  S/p HD on 01/17  Keep MAP>65  Renal Diet  Consent in physical chart    Hyper/Hypokalemia  Monitor K, will change dialysate fluid as needed    HTN  currently on pressure support  monitor bp     CKD MBD  Can send a PTH  Ca and Phos daily    Anemia  CRISTIANE with HD  Transfuse if hgb <7    CAD with multivessel disease  s/p CABG 12/30  CTICU following    Hypoxic Resp failure requiring Trach  Per surgery

## 2025-01-18 NOTE — PROCEDURE NOTE - NSBRONCHPROCDETAILS_GEN_A_CORE_FT
Indication: Partial collapse of left lung after bronchoscopy      History:    Preop medication:Diprivan    Xray Findings: Partial collapse of the left lung    Findings:  Bronchoscope inserted through tracheostomy  . trach noted to be in good position. Airway evaluation revealed Sharp Abena. AUGUSTO and LLL evaluation revealed moderate amount of thick yellow secretions suctioned after lavaging with saline using Ambu aScope bronchoscpe  RUL, RML, RLL revealed clean airways Bronchoscope then withdrawn from trach.   No bleeding noted  CxR revealed partial  resolution of left lung collapse

## 2025-01-18 NOTE — PROGRESS NOTE ADULT - ASSESSMENT
54M with PMH of HTN, HLD, ESRD on HD MWF via LUE AVF, ascites (requiring repeat paracenteses q4-6wks), NICM with moderate LV dysfunction w/ EF 35-40%() and CAD s/p atherectomy + SALLIE to D1(2024) presents to [facility] transferred from [facility]. Patient initially presented to [facility] ED with shortness of breath. Of note he was recently admitted from - for acute cholecystitis s/p cholecystectomy. In [facility] ED, pt was hypoxic to 86% on RA, trop 1.7K, EKG no new changes, CXR fluid overload. Admitted for AHRF 2/2 fluid overload. Pt received HD on , ,  and . DAPT was resumed, TTE showed improvement in LVEF to 40-45%. Stress test was abnormal with 1mm inferior STD, reversible ischemia in the inferior wall and fixed defects in the lateral, anterior and apical walls with post stress EF of 24%. Pt was then transferred to [facility] for cardiac cath which showed pLAD 70% ISR, mLCx 70%, D1 severe dz. Patient now transferred to [facility] CTS Dr. Rivers for CABG eval. Patient denies any current SOB or chest pain on admission. Otherwise denies headaches, abdominal pain, urinary or bowel changes, fevers, chills, N/V/D, sick contacts.  (24 Dec 2024 05:07)   VSS  CP free   HD via avf  for daily HD pre op- on lovenox 30 qd    HD today continue with present meds. Plan for CABG possibleTVR next week   vss; rsr 55-80; hd today w/ 1 unit prbc; pt to be dialzyed also this  w/ another 1 unit prbc   VSS; RSR 60-80; continue asa/bb/ statin; HD in am - transfuse 1 unit prbc with HD; d/c lovenox after am dose sun    - Pt underwent  C3L free LIMA-LAD; SVG-Diag; SVG-leftPL  Pt given cefuroxime perioperatively   pt extubated   . pt with hypotension and ECHO showing Systolic HF/depressed BIV Function, currently supported with /pressors.  Labs this evening showing transaminitis  1/2 -3 pt hypotensive, febrile and reintubated  Pt pancultured. and started on zosyn- meropenem and gent  pt found to have Gram negative bacteremia    E coli +ESBL bacteremia    : Afebrile, not on pressors, WBC normal. No new culture data. Bleeding around tracheostomy site. Role of antibiotics (IV and nebulized) at this point not clear.     Suggestions--  F/U sputum data  Unless stronger support for additional antibiotics emerges would hope to stop them altogether    If any ID input is needed over the remainder of the long weekend, please call [phone]. Thanks.    Gregg Arteaga MD  Attending Physician  [facility]  Division of Infectious Diseases  [phone]          54M with PMH of HTN, HLD, ESRD on HD MWF via LUE AVF, ascites (requiring repeat paracenteses q4-6wks), NICM with moderate LV dysfunction w/ EF 35-40%() and CAD s/p atherectomy + SALLIE to D1(2024) presents to [facility] transferred from [facility]. Patient initially presented to [facility] ED with shortness of breath. Of note he was recently admitted from - for acute cholecystitis s/p cholecystectomy. In [facility] ED, pt was hypoxic to 86% on RA, trop 1.7K, EKG no new changes, CXR fluid overload. Admitted for AHRF 2/2 fluid overload. Pt received HD on , ,  and . DAPT was resumed, TTE showed improvement in LVEF to 40-45%. Stress test was abnormal with 1mm inferior STD, reversible ischemia in the inferior wall and fixed defects in the lateral, anterior and apical walls with post stress EF of 24%. Pt was then transferred to [facility] for cardiac cath which showed pLAD 70% ISR, mLCx 70%, D1 severe dz. Patient now transferred to [facility] CTS Dr. Rivers for CABG eval. Patient denies any current SOB or chest pain on admission. Otherwise denies headaches, abdominal pain, urinary or bowel changes, fevers, chills, N/V/D, sick contacts.  (24 Dec 2024 05:07)   VSS  CP free   HD via avf  for daily HD pre op- on lovenox 30 qd    HD today continue with present meds. Plan for CABG possibleTVR next week   vss; rsr 55-80; hd today w/ 1 unit prbc; pt to be dialzyed also this  w/ another 1 unit prbc   VSS; RSR 60-80; continue asa/bb/ statin; HD in am - transfuse 1 unit prbc with HD; d/c lovenox after am dose sun    - Pt underwent  C3L free LIMA-LAD; SVG-Diag; SVG-leftPL  Pt given cefuroxime perioperatively   pt extubated   . pt with hypotension and ECHO showing Systolic HF/depressed BIV Function, currently supported with /pressors.  Labs this evening showing transaminitis  1/2 -3 pt hypotensive, febrile and reintubated  Pt pancultured. and started on zosyn- meropenem and gent  pt found to have Gram negative bacteremia    E coli +ESBL bacteremia    : Afebrile, on dobutmaine but not on pressors, WBC normal. No new culture data. Bleeding around tracheostomy site. Role of antibiotics (IV and nebulized) at this point not clear. CXR to my eye looks unchanged compared with prior.     Suggestions--  F/U sputum data  Unless stronger support for additional antibiotics emerges would hope to stop them altogether    If any ID input is needed over the remainder of the long weekend, please call [phone]. Thanks.    Gregg Arteaga MD  Attending Physician  [facility]  Division of Infectious Diseases  [phone]

## 2025-01-18 NOTE — PROGRESS NOTE ADULT - ASSESSMENT
54M s/p CABG x 3 on 12/30/24 > postop c/b acute respiratory failure 2/2 E coli bacteremia 2/2 pneumonia. Now s/p trach creation 1/16.     Plan:  - Noted blood around trach site, will monitor and keep gauze in place for now  - Trach sutures to remain in place until POD5 1/21  - surgery to follow   - appreciate care per CTICU     ACS/Trauma Surgery  [phone].

## 2025-01-19 LAB
ALBUMIN SERPL ELPH-MCNC: 2.6 G/DL — LOW (ref 3.3–5)
ALP SERPL-CCNC: 81 U/L — SIGNIFICANT CHANGE UP (ref 40–120)
ALT FLD-CCNC: 10 U/L — SIGNIFICANT CHANGE UP (ref 10–45)
ANION GAP SERPL CALC-SCNC: 15 MMOL/L — SIGNIFICANT CHANGE UP (ref 5–17)
AST SERPL-CCNC: 20 U/L — SIGNIFICANT CHANGE UP (ref 10–40)
BASOPHILS # BLD AUTO: 0.02 K/UL — SIGNIFICANT CHANGE UP (ref 0–0.2)
BASOPHILS NFR BLD AUTO: 0.2 % — SIGNIFICANT CHANGE UP (ref 0–2)
BILIRUB SERPL-MCNC: 0.5 MG/DL — SIGNIFICANT CHANGE UP (ref 0.2–1.2)
BUN SERPL-MCNC: 43 MG/DL — HIGH (ref 7–23)
CALCIUM SERPL-MCNC: 8.1 MG/DL — LOW (ref 8.4–10.5)
CHLORIDE SERPL-SCNC: 97 MMOL/L — SIGNIFICANT CHANGE UP (ref 96–108)
CO2 SERPL-SCNC: 24 MMOL/L — SIGNIFICANT CHANGE UP (ref 22–31)
CREAT SERPL-MCNC: 5.37 MG/DL — HIGH (ref 0.5–1.3)
EGFR: 12 ML/MIN/1.73M2 — LOW
EGFR: 12 ML/MIN/1.73M2 — LOW
EOSINOPHIL # BLD AUTO: 0.39 K/UL — SIGNIFICANT CHANGE UP (ref 0–0.5)
EOSINOPHIL NFR BLD AUTO: 4.1 % — SIGNIFICANT CHANGE UP (ref 0–6)
GLUCOSE SERPL-MCNC: 120 MG/DL — HIGH (ref 70–99)
HCT VFR BLD CALC: 24.3 % — LOW (ref 39–50)
HGB BLD-MCNC: 7.6 G/DL — LOW (ref 13–17)
IMM GRANULOCYTES NFR BLD AUTO: 0.6 % — SIGNIFICANT CHANGE UP (ref 0–0.9)
LYMPHOCYTES # BLD AUTO: 0.51 K/UL — LOW (ref 1–3.3)
LYMPHOCYTES # BLD AUTO: 5.4 % — LOW (ref 13–44)
MAGNESIUM SERPL-MCNC: 2.4 MG/DL — SIGNIFICANT CHANGE UP (ref 1.6–2.6)
MCHC RBC-ENTMCNC: 29.2 PG — SIGNIFICANT CHANGE UP (ref 27–34)
MCHC RBC-ENTMCNC: 31.3 G/DL — LOW (ref 32–36)
MCV RBC AUTO: 93.5 FL — SIGNIFICANT CHANGE UP (ref 80–100)
MONOCYTES # BLD AUTO: 0.61 K/UL — SIGNIFICANT CHANGE UP (ref 0–0.9)
MONOCYTES NFR BLD AUTO: 6.4 % — SIGNIFICANT CHANGE UP (ref 2–14)
NEUTROPHILS # BLD AUTO: 7.93 K/UL — HIGH (ref 1.8–7.4)
NEUTROPHILS NFR BLD AUTO: 83.3 % — HIGH (ref 43–77)
NRBC # BLD: 0 /100 WBCS — SIGNIFICANT CHANGE UP (ref 0–0)
NRBC BLD-RTO: 0 /100 WBCS — SIGNIFICANT CHANGE UP (ref 0–0)
PHOSPHATE SERPL-MCNC: 4.1 MG/DL — SIGNIFICANT CHANGE UP (ref 2.5–4.5)
PLATELET # BLD AUTO: 287 K/UL — SIGNIFICANT CHANGE UP (ref 150–400)
POTASSIUM SERPL-MCNC: 3.9 MMOL/L — SIGNIFICANT CHANGE UP (ref 3.5–5.3)
POTASSIUM SERPL-SCNC: 3.9 MMOL/L — SIGNIFICANT CHANGE UP (ref 3.5–5.3)
PROT SERPL-MCNC: 5.5 G/DL — LOW (ref 6–8.3)
RBC # BLD: 2.6 M/UL — LOW (ref 4.2–5.8)
RBC # FLD: 19.7 % — HIGH (ref 10.3–14.5)
SODIUM SERPL-SCNC: 136 MMOL/L — SIGNIFICANT CHANGE UP (ref 135–145)
SPECIMEN SOURCE: SIGNIFICANT CHANGE UP
WBC # BLD: 9.52 K/UL — SIGNIFICANT CHANGE UP (ref 3.8–10.5)
WBC # FLD AUTO: 9.52 K/UL — SIGNIFICANT CHANGE UP (ref 3.8–10.5)

## 2025-01-19 PROCEDURE — 71045 X-RAY EXAM CHEST 1 VIEW: CPT | Mod: 26

## 2025-01-19 PROCEDURE — 99291 CRITICAL CARE FIRST HOUR: CPT

## 2025-01-19 PROCEDURE — 93308 TTE F-UP OR LMTD: CPT | Mod: 26

## 2025-01-19 RX ORDER — HYDROMORPHONE/SOD CHLOR,ISO/PF 2 MG/10 ML
0.5 SYRINGE (ML) INJECTION ONCE
Refills: 0 | Status: DISCONTINUED | OUTPATIENT
Start: 2025-01-19 | End: 2025-01-19

## 2025-01-19 RX ORDER — HEPARIN SODIUM 1000 [USP'U]/ML
5000 INJECTION INTRAVENOUS; SUBCUTANEOUS EVERY 8 HOURS
Refills: 0 | Status: DISCONTINUED | OUTPATIENT
Start: 2025-01-19 | End: 2025-02-10

## 2025-01-19 RX ORDER — ACETAMINOPHEN 500 MG/5ML
1000 LIQUID (ML) ORAL ONCE
Refills: 0 | Status: COMPLETED | OUTPATIENT
Start: 2025-01-19 | End: 2025-01-19

## 2025-01-19 RX ADMIN — Medication 40 MILLIGRAM(S): at 12:41

## 2025-01-19 RX ADMIN — AMIODARONE HYDROCHLORIDE 200 MILLIGRAM(S): 50 INJECTION, SOLUTION INTRAVENOUS at 06:03

## 2025-01-19 RX ADMIN — Medication 0.5 MILLIGRAM(S): at 06:03

## 2025-01-19 RX ADMIN — Medication 125 MILLIGRAM(S): at 06:25

## 2025-01-19 RX ADMIN — Medication 10 MILLIGRAM(S): at 12:43

## 2025-01-19 RX ADMIN — Medication 10 MILLIGRAM(S): at 22:53

## 2025-01-19 RX ADMIN — Medication 1 TABLET(S): at 12:41

## 2025-01-19 RX ADMIN — Medication 100 MILLIGRAM(S): at 22:53

## 2025-01-19 RX ADMIN — Medication 1 SPRAY(S): at 06:16

## 2025-01-19 RX ADMIN — Medication 1 APPLICATION(S): at 22:53

## 2025-01-19 RX ADMIN — Medication 2.5 MILLIGRAM(S): at 17:44

## 2025-01-19 RX ADMIN — Medication 400 MILLIGRAM(S): at 02:38

## 2025-01-19 RX ADMIN — Medication 400 MILLIGRAM(S): at 12:40

## 2025-01-19 RX ADMIN — Medication 10 MILLIGRAM(S): at 05:39

## 2025-01-19 RX ADMIN — Medication 1000 MILLIGRAM(S): at 12:55

## 2025-01-19 RX ADMIN — ACETYLCYSTEINE 4 MILLILITER(S): 200 INHALANT RESPIRATORY (INHALATION) at 17:44

## 2025-01-19 RX ADMIN — HEPARIN SODIUM 5000 UNIT(S): 1000 INJECTION INTRAVENOUS; SUBCUTANEOUS at 22:53

## 2025-01-19 RX ADMIN — Medication 0.5 MILLIGRAM(S): at 06:30

## 2025-01-19 RX ADMIN — Medication 15 MILLILITER(S): at 06:03

## 2025-01-19 RX ADMIN — Medication 2.5 MILLIGRAM(S): at 12:03

## 2025-01-19 RX ADMIN — Medication 15 MILLILITER(S): at 17:52

## 2025-01-19 RX ADMIN — ACETYLCYSTEINE 4 MILLILITER(S): 200 INHALANT RESPIRATORY (INHALATION) at 12:03

## 2025-01-19 RX ADMIN — Medication 2.5 MILLIGRAM(S): at 23:06

## 2025-01-19 RX ADMIN — TOBRAMYCIN 300 MILLIGRAM(S): 300 SOLUTION RESPIRATORY (INHALATION) at 05:12

## 2025-01-19 RX ADMIN — GABAPENTIN 100 MILLIGRAM(S): 400 CAPSULE ORAL at 17:52

## 2025-01-19 RX ADMIN — Medication 81 MILLIGRAM(S): at 12:41

## 2025-01-19 RX ADMIN — Medication 500 MILLIGRAM(S): at 12:41

## 2025-01-19 RX ADMIN — ATORVASTATIN CALCIUM 80 MILLIGRAM(S): 80 TABLET, FILM COATED ORAL at 22:53

## 2025-01-19 RX ADMIN — Medication 2.5 MILLIGRAM(S): at 05:11

## 2025-01-19 RX ADMIN — GABAPENTIN 100 MILLIGRAM(S): 400 CAPSULE ORAL at 06:03

## 2025-01-19 RX ADMIN — ACETYLCYSTEINE 4 MILLILITER(S): 200 INHALANT RESPIRATORY (INHALATION) at 23:08

## 2025-01-19 RX ADMIN — Medication 1000 MILLIGRAM(S): at 03:00

## 2025-01-19 RX ADMIN — Medication 1 APPLICATION(S): at 05:39

## 2025-01-19 RX ADMIN — Medication 1 SPRAY(S): at 17:52

## 2025-01-19 RX ADMIN — ACETYLCYSTEINE 4 MILLILITER(S): 200 INHALANT RESPIRATORY (INHALATION) at 05:11

## 2025-01-19 NOTE — PROGRESS NOTE ADULT - ASSESSMENT
54M with PMH of HTN, HLD, ESRD on HD MWF via LUE AVF, ascites (requiring repeat paracenteses q4-6wks), NICM with moderate LV dysfunction w/ EF 35-40%() and CAD s/p atherectomy + SALLIE to D1(2024) presents to [facility] transferred from [facility]. Patient initially presented to [facility] ED with shortness of breath. Of note he was recently admitted from - for acute cholecystitis s/p cholecystectomy. In [facility] ED, pt was hypoxic to 86% on RA, trop 1.7K, EKG no new changes, CXR fluid overload. Admitted for AHRF 2/2 fluid overload. Pt received HD on , ,  and . DAPT was resumed, TTE showed improvement in LVEF to 40-45%. Stress test was abnormal with 1mm inferior STD, reversible ischemia in the inferior wall and fixed defects in the lateral, anterior and apical walls with post stress EF of 24%.     Pt was then transferred to [facility] for cardiac cath which showed pLAD 70% ISR, mLCx 70%, D1 severe dz.     Patient now transferred to [facility] CTS Dr. Rivers for CABG eval.# CAD s/p NSTEMI  Hx CAD s/p PCI LAD   Presented with ADHF elevated troponins  Positive NST1-Cath- pLAD 70% ISR, mLCx 70%, D1 severe dz.   TTE EF 35% Severe TRWas on Plavix-p2y12- 321 been off Plavix since -POD#1-C3L free LIMA-LAD; SVG-Diag; TZN-ixduKX-Apvvumqva on  Sinus rhythm,Amio for AF prophylaxis  -OOB off  Sinus rhythm   -POD#3-On /pressors-EF 25-30% RV failure with transaminitis-Sinus Jwmpca25/03-POD#4-Was intubated for hypoxia-gradual hypotension on /pressors had IABP inserted this morning  -POD#5-s/p IABP insertion, sepsis/septic shock due to GNR/ECOLI HD cath placed   Bronched yesterday 1/3 - minimal secretions with rust-tinged sputum     ESBL-E Coli bacteremia on meropenam    - POD#7 Atrial Fib ,remains intubated weaning IABP 1:3,off pressors on CRRT  -IABP removed.Remains on vent-Alex 10ppm,off Levo- CVP 10 / MAP 71 / Hct 25.6% / Lactate 1.7-Atrial Fibrillation  -Intubated on Alex 10ppm, gtt, BP better-AF~~90's on Amio-had bronch still febrile Tmax 31228/09-Extubated awake alert on HFNC AF,on Dobutrex-CRRT,Cultures no growth    01/10-OOB on BiPAP Sinus Rhythm on -SR/ MAP 57/ CVP 10/  Hct 26.7 / Lact 1.4  -s/p EDU/DCCV-Sinus rhythm on -SR / MAP 52 / CVP 12/ Hct 25.8 / Lact 0.7-had bronch with BAL Left lung  -Sinus rhythm on -for repeat Bronch-SR / MAP 67 / CVP 11 / Hct 27.4% / Lact 0.8  -s/p Trach on  TTE EF 55%-SR / CVP 2 / MAP 63 / Hct 25.9% / Lactate 0.9        # Acute on Chronic Systolic Heart Failure now improved  EF-35% ,severe TR  Had HD post op  GDMT post op  LVEF improved post bypass but now at 23-30%-BiV dysfunction on  now off pressors  -TTE EF 40%,trace effusion  ECG c/w pericarditis-was on colchicine   01/15-TTE EF 55%    Vascular evaluated  AVGraft /?steal causing RV failure-'' Very low suspicion of steal Sd and AVF causing current clinical state. ''   VA Duplex Hemodialysis Access, Left (25 @ 13:35) >   Arterial flow volume of 1301 mL/m, and the venous flow volume of  1492 mL/m are consistent with a normally functioning dialysis access  fistula.    # Ascites  Hx chronic ascites  Has drainage q4-6 weeks  Last drained -4600mL  ? ascites related to severe TR  Had otvrydhmaple-Eptrqnv-vu growth   CT Abdomen 01/10-Large volume ascites-consider paracentesis  Monitor      # ESRD  Now off CRRT transition to HD

## 2025-01-19 NOTE — PROGRESS NOTE ADULT - SUBJECTIVE AND OBJECTIVE BOX
Patient seen and examined at the bedside.    Remains critically ill on continuous ICU monitoring, at risk for life threatening decompensation.  All Labs, data reviewed. Plan of care discussed in length during multi-disciplinary ICU rounds.       Brief Summary:  53 yo M with multiple medical problems including CAD s/p PCI in April 2024 s/p CABG x 3 on 12/30/24  1/2: Intubated for hypoxia & left lung white out s/p awake bronch with removal of copious mucopurulent secretions  1/4: s/p IABP insertion, sepsis/septic shock due to E coli   1/15: Emergently reintubated.  1/17: HD and 2.5L UF removal      24 Hour events:  Hemodialysis scheduled for tomorrow  QOD bronchs   Heparin held for trach ooze  Bronched yesterday (1/18), f/u BAL  TTE yesterday  Dobutamine weaned off yesterday      Objective:  Vital Signs Last 24 Hrs  T(C): 37.3 (19 Jan 2025 04:00), Max: 37.4 (18 Jan 2025 08:00)  T(F): 99.1 (19 Jan 2025 04:00), Max: 99.3 (18 Jan 2025 08:00)  HR: 85 (19 Jan 2025 07:00) (67 - 88)  BP: --  BP(mean): --  RR: 34 (19 Jan 2025 07:00) (14 - 60)  SpO2: 100% (19 Jan 2025 07:00) (90% - 100%)    Parameters below as of 19 Jan 2025 05:22  Patient On (Oxygen Delivery Method): ventilator        Mode: CPAP with PS  FiO2: 40  PEEP: 8  PS: 15  ITime: 1  MAP: 13  PIP: 24              Physical Exam:   General: Alert and interactive, trach to vent.  Neurology: Oriented, following commands. Currently on sedation  Respiratory: Breath sounds bilaterally  CV: Sinus  Abdominal: Soft, Nontender  Extremities: Warm, well-perfused  Right arm tender, but stable  -------------------------------------------------------------------------------------------------------------------------------    Labs:                        7.6    9.52  )-----------( 287      ( 19 Jan 2025 00:21 )             24.3     01-19    136  |  97  |  43[H]  ----------------------------<  120[H]  3.9   |  24  |  5.37[H]    Ca    8.1[L]      19 Jan 2025 00:21  Phos  4.1     01-19  Mg     2.4     01-19    TPro  5.5[L]  /  Alb  2.6[L]  /  TBili  0.5  /  DBili  x   /  AST  20  /  ALT  10  /  AlkPhos  81  01-19    LIVER FUNCTIONS - ( 19 Jan 2025 00:21 )  Alb: 2.6 g/dL / Pro: 5.5 g/dL / ALK PHOS: 81 U/L / ALT: 10 U/L / AST: 20 U/L / GGT: x           PT/INR - ( 18 Jan 2025 00:51 )   PT: 15.3 sec;   INR: 1.33 ratio         PTT - ( 18 Jan 2025 00:51 )  PTT:41.0 sec  ABG - ( 18 Jan 2025 23:53 )  pH, Arterial: 7.54  pH, Blood: x     /  pCO2: 32    /  pO2: 126   / HCO3: 27    / Base Excess: 4.7   /  SaO2: 99.8                  ALL MEDICATIONS   MEDICATIONS  (STANDING):  acetylcysteine 20%  Inhalation 4 milliLiter(s) Inhalation every 6 hours  albuterol    0.083% 2.5 milliGRAM(s) Nebulizer every 6 hours  aMIOdarone    Tablet   Oral   aMIOdarone    Tablet 200 milliGRAM(s) Oral daily  ascorbic acid 500 milliGRAM(s) Oral daily  aspirin  chewable 81 milliGRAM(s) Oral daily  atorvastatin 80 milliGRAM(s) Oral at bedtime  bisacodyl Suppository 10 milliGRAM(s) Rectal once  chlorhexidine 0.12% Liquid 15 milliLiter(s) Oral Mucosa every 12 hours  chlorhexidine 2% Cloths 1 Application(s) Topical daily  chlorhexidine 4% Liquid 1 Application(s) Topical <User Schedule>  dextrose 50% Injectable 50 milliLiter(s) IV Push every 15 minutes  epoetin ignacia (EPOGEN) Injectable 52291 Unit(s) IV Push <User Schedule>  gabapentin 100 milliGRAM(s) Oral every 12 hours  hydrALAZINE 10 milliGRAM(s) Oral every 8 hours  insulin lispro (ADMELOG) corrective regimen sliding scale   SubCutaneous every 6 hours  meropenem  IVPB 500 milliGRAM(s) IV Intermittent every 24 hours  Nephro-elicia 1 Tablet(s) Oral daily  pantoprazole  Injectable 40 milliGRAM(s) IV Push daily  sodium chloride 0.65% Nasal 1 Spray(s) Both Nostrils every 12 hours  sodium chloride 0.9%. 1000 milliLiter(s) (10 mL/Hr) IV Continuous <Continuous>  tobramycin for Nebulization 300 milliGRAM(s) Inhalation every 12 hours  traZODone 100 milliGRAM(s) Oral at bedtime  vancomycin    Solution 125 milliGRAM(s) Oral every 12 hours    MEDICATIONS  (PRN):  benzocaine 20% Spray 1 Spray(s) Topical every 6 hours PRN throat pain  lidocaine/prilocaine Cream 1 Application(s) Topical daily PRN pre-HD  sodium chloride 0.9% lock flush 10 milliLiter(s) IV Push every 1 hour PRN Pre/post blood products, medications, blood draw, and to maintain line patency    ------------------------------------------------------------------------------------------------------------------------------  Assessment:  53 yo M s/p CABG x 3 on 12/30/24    Postop acute respiratory failure  Acute blood loss anemia  Ascites    ESRD on iHD   E coli bacteremia 2/2 pneumonia  Plan:  ***Neuro***  Postoperative acute pain control with Gabapentin and prns.  Trazodone nightly  PT ongoing    ***Cardiovascular***  s/p CABG x 3  Remains on low dose Dobutamine 1 --> d/c today  A fib s/p cardioversion - Amiodarone daily.  Hydralazine for hypertension.  ASA / Statin daily    ***Pulmonary***  Postoperative acute respiratory failure  Tracheostomy 1/16   s/p Bronchoscopy 1/8  PS trials for recruitment  Continue bronchodilators as tolerated  Bronched yesterday (1/18), f/u BAL    ***GI***  On Tube feeds.  Protonix for stress ulcer prophylaxis.   Ascites: Last paracentesis 1/11    ***Renal***  ESRD - last HD 1/17: -2.5L UF (MWF)  LUE AV fistula   Nephro-Elicia for renal support    ***ID***  Sepsis/septic shock due to ESBL E. coli - Continue Meropenem. inhaled mary  Trend leukocytosis   PO Vancomycin for C diff prophylaxis   Mccauley stain negative.  Sacral wound - continue wound care, may benefit from a VAC    ***Endocrine***  Hyperglycemia - Insulin sliding scale    ***Hematology***  Acute blood loss anemia  No current transfusion indication, trend CBC and monitor for bleeding.  A fib and DCCV and on heparin gtt since 1/9: held on 1/18 for trach bleeding  SCD's  Continue on Epogen.          I, Stella Hodges MD, personally performed the services described in this documentation. all medical record entries made by the scribe were at my direction and in my presence. I have reviewed the chart and agree that the record reflects my personal performance and is accurate and complete  Electronically signed:   Stella Hodges MD  CT ICU attending     ICU time: ** mins     By signing my name below, I, Mel Castro, attest that this documentation has been prepared under the direction and in the presence of Stella Hodges MD  Electronically signed: Mel Castro, 01-19-25 @ 07:49             Patient seen and examined at the bedside.    Remains critically ill on continuous ICU monitoring, at risk for life threatening decompensation.  All Labs, data reviewed. Plan of care discussed in length during multi-disciplinary ICU rounds.       Brief Summary:  53 yo M with multiple medical problems including CAD s/p PCI in 2024 s/p CABG x 3 on 24  1: Intubated for hypoxia & left lung white out s/p awake bronch with removal of copious mucopurulent secretions  : s/p IABP insertion, sepsis/septic shock due to E coli   1/15: Emergently reintubated.  : HD and 2.5L UF removal  : d/gladys , PS tolerated    24 Hour events:  Hemodialysis scheduled for tomorrow  Tolerated PS yesterday and will try HFTC today  Heparin held for trach ooze  Dobutamine weaned off yesterday  HD MWF    Objective:  Vital Signs Last 24 Hrs  T(C): 37.3 (2025 04:00), Max: 37.4 (2025 08:00)  T(F): 99.1 (2025 04:00), Max: 99.3 (2025 08:00)  HR: 85 (2025 07:00) (67 - 88)  BP: --  BP(mean): --  RR: 34 (2025 07:00) (14 - 60)  SpO2: 100% (2025 07:00) (90% - 100%)    Parameters below as of 2025 05:22  Patient On (Oxygen Delivery Method): ventilator        Mode: CPAP with PS  FiO2: 40  PEEP: 8  PS: 15  ITime: 1  MAP: 13  PIP: 24              Physical Exam:   General: Alert and interactive, trach to vent.  Neurology: Oriented, following commands. Currently on sedation  Respiratory: Breath sounds bilaterally  CV: Sinus  Abdominal: Soft, Nontender  Extremities: Warm, well-perfused  Right arm tender, but stable  -------------------------------------------------------------------------------------------------------------------------------    Labs:                        7.6    9.52  )-----------( 287      ( 2025 00:21 )             24.3     -    136  |  97  |  43[H]  ----------------------------<  120[H]  3.9   |  24  |  5.37[H]    Ca    8.1[L]      2025 00:21  Phos  4.1       Mg     2.4         TPro  5.5[L]  /  Alb  2.6[L]  /  TBili  0.5  /  DBili  x   /  AST  20  /  ALT  10  /  AlkPhos  81  19    LIVER FUNCTIONS - ( 2025 00:21 )  Alb: 2.6 g/dL / Pro: 5.5 g/dL / ALK PHOS: 81 U/L / ALT: 10 U/L / AST: 20 U/L / GGT: x           PT/INR - ( 2025 00:51 )   PT: 15.3 sec;   INR: 1.33 ratio         PTT - ( 2025 00:51 )  PTT:41.0 sec  ABG - ( 2025 23:53 )  pH, Arterial: 7.54  pH, Blood: x     /  pCO2: 32    /  pO2: 126   / HCO3: 27    / Base Excess: 4.7   /  SaO2: 99.8                  ALL MEDICATIONS   MEDICATIONS  (STANDING):  acetylcysteine 20%  Inhalation 4 milliLiter(s) Inhalation every 6 hours  albuterol    0.083% 2.5 milliGRAM(s) Nebulizer every 6 hours  aMIOdarone    Tablet   Oral   aMIOdarone    Tablet 200 milliGRAM(s) Oral daily  ascorbic acid 500 milliGRAM(s) Oral daily  aspirin  chewable 81 milliGRAM(s) Oral daily  atorvastatin 80 milliGRAM(s) Oral at bedtime  bisacodyl Suppository 10 milliGRAM(s) Rectal once  chlorhexidine 0.12% Liquid 15 milliLiter(s) Oral Mucosa every 12 hours  chlorhexidine 2% Cloths 1 Application(s) Topical daily  chlorhexidine 4% Liquid 1 Application(s) Topical <User Schedule>  dextrose 50% Injectable 50 milliLiter(s) IV Push every 15 minutes  epoetin ignacia (EPOGEN) Injectable 85604 Unit(s) IV Push <User Schedule>  gabapentin 100 milliGRAM(s) Oral every 12 hours  hydrALAZINE 10 milliGRAM(s) Oral every 8 hours  insulin lispro (ADMELOG) corrective regimen sliding scale   SubCutaneous every 6 hours  meropenem  IVPB 500 milliGRAM(s) IV Intermittent every 24 hours  Nephro-elicia 1 Tablet(s) Oral daily  pantoprazole  Injectable 40 milliGRAM(s) IV Push daily  sodium chloride 0.65% Nasal 1 Spray(s) Both Nostrils every 12 hours  sodium chloride 0.9%. 1000 milliLiter(s) (10 mL/Hr) IV Continuous <Continuous>  tobramycin for Nebulization 300 milliGRAM(s) Inhalation every 12 hours  traZODone 100 milliGRAM(s) Oral at bedtime  vancomycin    Solution 125 milliGRAM(s) Oral every 12 hours    MEDICATIONS  (PRN):  benzocaine 20% Spray 1 Spray(s) Topical every 6 hours PRN throat pain  lidocaine/prilocaine Cream 1 Application(s) Topical daily PRN pre-HD  sodium chloride 0.9% lock flush 10 milliLiter(s) IV Push every 1 hour PRN Pre/post blood products, medications, blood draw, and to maintain line patency    ------------------------------------------------------------------------------------------------------------------------------  Assessment:  53 yo M s/p CABG x 3 on 24    Postop acute respiratory failure  Acute blood loss anemia  Ascites    ESRD on iHD   E coli bacteremia 2/2 pneumonia    Plan:  ***Neuro***  Postoperative acute pain control with Gabapentin and prns.  Trazodone nightly  PT ongoing    ***Cardiovascular***  s/p CABG x 3  Remains on low dose Dobutamine 1 --> d/c today  A fib s/p cardioversion - Amiodarone daily.  Hydralazine for hypertension.  ASA / Statin daily    ***Pulmonary***  Postoperative acute respiratory failure  Tracheostomy    s/p Bronchoscopy   HFTC in AM and PS at night to recruit  Continue bronchodilators as tolerated      ***GI***  On Tube feeds at goal  Protonix for stress ulcer prophylaxis.   Ascites: Last paracentesis     ***Renal***  ESRD - last HD : -2.5L UF (MWF)  LUE AV fistula   Nephro-Elicia for renal support    ***ID***  Sepsis/septic shock due to ESBL E. coli - Continue Meropenem. inhaled mary  Trend leukocytosis   PO Vancomycin for C diff prophylaxis   Mccauley stain negative.  Sacral wound - continue wound care, may benefit from a VAC    ***Endocrine***  Hyperglycemia - Insulin sliding scale    ***Hematology***  Acute blood loss anemia  No current transfusion indication, trend CBC and monitor for bleeding.  A fib and DCCV and on heparin gtt since : held on  for trach bleeding  SCD's  Continue on Epogen.          I, Stella Hodges MD, personally performed the services described in this documentation. all medical record entries made by the scribe were at my direction and in my presence. I have reviewed the chart and agree that the record reflects my personal performance and is accurate and complete  Electronically signed:   Stella Hodges MD  CT ICU attending     ICU time: 55 mins     By signing my name below, I, Mel Castro, attest that this documentation has been prepared under the direction and in the presence of Stella Hodges MD  Electronically signed: Mel Castro, 25 @ 07:49

## 2025-01-19 NOTE — PROGRESS NOTE ADULT - ASSESSMENT
54M with PMH of HTN, HLD, ESRD on HD MWF via LUE AVF, ascites (requiring repeat paracenteses q4-6wks), NICM with moderate LV dysfunction w/ EF 35-40%() and CAD s/p atherectomy + SALLIE to D1(2024) presents to [facility] transferred from [facility]. Patient initially presented to [facility] ED with shortness of breath. Of note he was recently admitted from - for acute cholecystitis s/p cholecystectomy. In [facility] ED, pt was hypoxic to 86% on RA, trop 1.7K, EKG no new changes, CXR fluid overload. Admitted for AHRF 2/2 fluid overload. Pt received HD on , ,  and . DAPT was resumed, TTE showed improvement in LVEF to 40-45%. Stress test was abnormal with 1mm inferior STD, reversible ischemia in the inferior wall and fixed defects in the lateral, anterior and apical walls with post stress EF of 24%.     Pt was then transferred to [facility] for cardiac cath which showed pLAD 70% ISR, mLCx 70%, D1 severe dz.     Patient now transferred to [facility] CTS Dr. Rivers for CABG eval.# CAD s/p NSTEMI  Hx CAD s/p PCI LAD   Presented with ADHF elevated troponins  Positive NST1-Cath- pLAD 70% ISR, mLCx 70%, D1 severe dz.   TTE EF 35% Severe TRWas on Plavix-p2y12- 321 been off Plavix since -POD#1-C3L free LIMA-LAD; SVG-Diag; JEY-edcsYP-Ecpggkexa on  Sinus rhythm,Amio for AF prophylaxis  -OOB off  Sinus rhythm   -POD#3-On /pressors-EF 25-30% RV failure with transaminitis-Sinus Jofxmi08/03-POD#4-Was intubated for hypoxia-gradual hypotension on /pressors had IABP inserted this morning  -POD#5-s/p IABP insertion, sepsis/septic shock due to GNR/ECOLI HD cath placed   Bronched yesterday 1/3 - minimal secretions with rust-tinged sputum     ESBL-E Coli bacteremia on meropenam    - POD#7 Atrial Fib ,remains intubated weaning IABP 1:3,off pressors on CRRT  -IABP removed.Remains on vent-Alex 10ppm,off Levo- CVP 10 / MAP 71 / Hct 25.6% / Lactate 1.7-Atrial Fibrillation  -Intubated on Alex 10ppm, gtt, BP better-AF~~90's on Amio-had bronch still febrile Tmax 28841/09-Extubated awake alert on HFNC AF,on Dobutrex-CRRT,Cultures no growth    01/10-OOB on BiPAP Sinus Rhythm on -SR/ MAP 57/ CVP 10/  Hct 26.7 / Lact 1.4  -s/p EDU/DCCV-Sinus rhythm on -SR / MAP 52 / CVP 12/ Hct 25.8 / Lact 0.7-had bronch with BAL Left lung  -Sinus rhythm on -for repeat Bronch-SR / MAP 67 / CVP 11 / Hct 27.4% / Lact 0.8  -s/p Trach on  TTE EF 55%-SR / CVP 2 / MAP 63 / Hct 25.9% / Lactate 0.9        # Acute on Chronic Systolic Heart Failure now improved  EF-35% ,severe TR  Had HD post op  GDMT post op  LVEF improved post bypass but now at 23-30%-BiV dysfunction on  now off pressors  -TTE EF 40%,trace effusion  ECG c/w pericarditis-was on colchicine   01/15-TTE EF 55%    Vascular evaluated  AVGraft /?steal causing RV failure-'' Very low suspicion of steal Sd and AVF causing current clinical state. ''   VA Duplex Hemodialysis Access, Left (25 @ 13:35) >   Arterial flow volume of 1301 mL/m, and the venous flow volume of  1492 mL/m are consistent with a normally functioning dialysis access  fistula.    # Ascites  Hx chronic ascites  Has drainage q4-6 weeks  Last drained -4600mL  ? ascites related to severe TR  Had utgbylhrvdgp-Xcncglk-vw growth   CT Abdomen 01/10-Large volume ascites-consider paracentesis  Monitor      # ESRD  Now off CRRT transition to HD

## 2025-01-19 NOTE — PROGRESS NOTE ADULT - ASSESSMENT
54M with PMH of HTN, HLD, ESRD on HD MWF via LUE AVF, ascites (requiring repeat paracenteses q4-6wks), NICM with moderate LV dysfunction w/ EF 35-40%(2023) and CAD s/p atherectomy + SALLIE to D1(4/11/2024) presents to [facility] transferred from [facility] for CABG eval  Nephro on [facility] for HD    ESRD on HD   Regular schedule MWF   Access LUE AVF  Center [facility]  Nephrologist Dr. Bailey  Last HD on 12/24  S/p HD on 12/27 12/29, 12/30 for hyperkalemia and 12/31  2 L PUF On 01/02/2024  However hospital course now complicated by Cardiogenic shock now on multiple pressors,   CRRT initiated 01/03, off since 01/08   S/p PUF on 01/09   HD held on 01/10 due to instability,  S/p HD on 01/17  NO plan for HD today  Keep MAP>65  Renal Diet  Consent in physical chart    Hyper/Hypokalemia  Monitor K, will change dialysate fluid as needed    HTN  currently on pressure support  monitor bp     CKD MBD  Can send a PTH  Ca and Phos daily    Anemia  CRISTIANE with HD  Transfuse if hgb <7    CAD with multivessel disease  s/p CABG 12/30  CTICU following    Hypoxic Resp failure requiring Trach  Per surgery

## 2025-01-19 NOTE — PROGRESS NOTE ADULT - SUBJECTIVE AND OBJECTIVE BOX
SUBJECTIVE / OVERNIGHT EVENTS:pt seen and examined  01-19-25     MEDICATIONS  (STANDING):  acetylcysteine 20%  Inhalation 4 milliLiter(s) Inhalation every 6 hours  albuterol    0.083% 2.5 milliGRAM(s) Nebulizer every 6 hours  aMIOdarone    Tablet   Oral   aMIOdarone    Tablet 200 milliGRAM(s) Oral daily  ascorbic acid 500 milliGRAM(s) Oral daily  aspirin  chewable 81 milliGRAM(s) Oral daily  atorvastatin 80 milliGRAM(s) Oral at bedtime  bisacodyl Suppository 10 milliGRAM(s) Rectal once  chlorhexidine 0.12% Liquid 15 milliLiter(s) Oral Mucosa every 12 hours  chlorhexidine 2% Cloths 1 Application(s) Topical daily  chlorhexidine 4% Liquid 1 Application(s) Topical <User Schedule>  dextrose 50% Injectable 50 milliLiter(s) IV Push every 15 minutes  epoetin ignacia (EPOGEN) Injectable 68391 Unit(s) IV Push <User Schedule>  gabapentin 100 milliGRAM(s) Oral every 12 hours  heparin   Injectable 5000 Unit(s) SubCutaneous every 8 hours  hydrALAZINE 10 milliGRAM(s) Oral every 8 hours  insulin lispro (ADMELOG) corrective regimen sliding scale   SubCutaneous every 6 hours  Nephro-elicia 1 Tablet(s) Oral daily  pantoprazole  Injectable 40 milliGRAM(s) IV Push daily  sodium chloride 0.65% Nasal 1 Spray(s) Both Nostrils every 12 hours  sodium chloride 0.9%. 1000 milliLiter(s) (10 mL/Hr) IV Continuous <Continuous>  traZODone 100 milliGRAM(s) Oral at bedtime    MEDICATIONS  (PRN):  benzocaine 20% Spray 1 Spray(s) Topical every 6 hours PRN throat pain  lidocaine/prilocaine Cream 1 Application(s) Topical daily PRN pre-HD  sodium chloride 0.9% lock flush 10 milliLiter(s) IV Push every 1 hour PRN Pre/post blood products, medications, blood draw, and to maintain line patency    T(C): 36.6 (01-19-25 @ 16:00), Max: 37.4 (01-18-25 @ 20:00)  HR: 70 (01-19-25 @ 19:00) (66 - 86)  BP: --  RR: 35 (01-19-25 @ 19:00) (14 - 35)  SpO2: 96% (01-19-25 @ 19:00) (92% - 100%)    CAPILLARY BLOOD GLUCOSE  145 (19 Jan 2025 06:00)  125 (19 Jan 2025 00:00)      POCT Blood Glucose.: 121 mg/dL (19 Jan 2025 17:51)  POCT Blood Glucose.: 139 mg/dL (19 Jan 2025 12:45)  POCT Blood Glucose.: 144 mg/dL (19 Jan 2025 06:11)  POCT Blood Glucose.: 125 mg/dL (18 Jan 2025 23:52)    I&O's Summary    18 Jan 2025 07:01  -  19 Jan 2025 07:00  --------------------------------------------------------  IN: 1343.3 mL / OUT: 0 mL / NET: 1343.3 mL    19 Jan 2025 07:01  -  19 Jan 2025 19:45  --------------------------------------------------------  IN: 760 mL / OUT: 0 mL / NET: 760 mL    PHYSICAL EXAM:  GENERAL: NAD  EYES: EOMI, PERRLA  NECK: Supple, No JVD  CHEST/LUNG: dec breath sounds at bases  HEART:  S1 , S2 +  ABDOMEN: soft , bs+  EXTREMITIES:  edema  NEUROLOGY:alert awake      LABS:                        7.6    9.52  )-----------( 287      ( 19 Jan 2025 00:21 )             24.3     01-19    136  |  97  |  43[H]  ----------------------------<  120[H]  3.9   |  24  |  5.37[H]    Ca    8.1[L]      19 Jan 2025 00:21  Phos  4.1     01-19  Mg     2.4     01-19    TPro  5.5[L]  /  Alb  2.6[L]  /  TBili  0.5  /  DBili  x   /  AST  20  /  ALT  10  /  AlkPhos  81  01-19    PT/INR - ( 18 Jan 2025 00:51 )   PT: 15.3 sec;   INR: 1.33 ratio         PTT - ( 18 Jan 2025 00:51 )  PTT:41.0 sec      Urinalysis Basic - ( 19 Jan 2025 00:21 )    Color: x / Appearance: x / SG: x / pH: x  Gluc: 120 mg/dL / Ketone: x  / Bili: x / Urobili: x   Blood: x / Protein: x / Nitrite: x   Leuk Esterase: x / RBC: x / WBC x   Sq Epi: x / Non Sq Epi: x / Bacteria: x        RADIOLOGY & ADDITIONAL TESTS:    Imaging Personally Reviewed:    Consultant(s) Notes Reviewed:      Care Discussed with Consultants/Other Providers:

## 2025-01-19 NOTE — PROGRESS NOTE ADULT - SUBJECTIVE AND OBJECTIVE BOX
PATIENT SEEN AND EXAMINED BY JEAN PAUL GABRIEL M.D. ON :- 1/19/25  DATE OF SERVICE:  1/19/25           Interim events noted,Labs ,Radiological studies and Cardiology tests reviewed .    Patient is a 54y old  Male who presents with a chief complaint of CAD s/p CABG (17 Jan 2025 06:13)      HPI:  54M with PMH of HTN, HLD, ESRD on HD MWF via LUE AVF, ascites (requiring repeat paracenteses q4-6wks), NICM with moderate LV dysfunction w/ EF 35-40%(2023) and CAD s/p atherectomy + SALLIE to D1(4/11/2024) presents to [facility] transferred from [facility]. Patient initially presented to [facility] ED with shortness of breath. Of note he was recently admitted from 09/27-12/02 for acute cholecystitis s/p cholecystectomy. In [facility] ED, pt was hypoxic to 86% on RA, trop 1.7K, EKG no new changes, CXR fluid overload. Admitted for AHRF 2/2 fluid overload. Pt received HD on 12/18, 12/19, 12/20 and 12/22. DAPT was resumed, TTE showed improvement in LVEF to 40-45%. Stress test was abnormal with 1mm inferior STD, reversible ischemia in the inferior wall and fixed defects in the lateral, anterior and apical walls with post stress EF of 24%. Pt was then transferred to [facility] for cardiac cath which showed pLAD 70% ISR, mLCx 70%, D1 severe dz. Patient now transferred to [facility] CTS Dr. Rivers for CABG eval. Patient denies any current SOB or chest pain on admission. Otherwise denies headaches, abdominal pain, urinary or bowel changes, fevers, chills, N/V/D, sick contacts.  (24 Dec 2024 05:07)      PAST MEDICAL & SURGICAL HISTORY:  Type 2 diabetes mellitus      Hypertension      End stage renal disease  started HD 2/2019 T, Th, Sat via right chest permacath      Bone spur  right shoulder- hx of - sx done      Anemia      Injury of right wrist  hx of at age 14      History of hyperkalemia  before HD- K-6.2 - to repeat in am of sx, pt had HD today      S/P arthroscopy of right shoulder  2010      History of vascular access device  right chest permacath - 2/2019      H/O right wrist surgery  tendons repair - at age 14      AV fistula      H/O ventral hernia repair      S/P cholecystectomy          PREVIOUS DIAGNOSTIC TESTING:      ECHO  FINDINGS:    STRESS  FINDINGS:    CATHETERIZATION  FINDINGS:    MEDICATIONS  (STANDING):  acetylcysteine 20%  Inhalation 4 milliLiter(s) Inhalation every 6 hours  albuterol    0.083% 2.5 milliGRAM(s) Nebulizer every 6 hours  aMIOdarone    Tablet   Oral   aMIOdarone    Tablet 200 milliGRAM(s) Oral daily  ascorbic acid 500 milliGRAM(s) Oral daily  aspirin  chewable 81 milliGRAM(s) Oral daily  atorvastatin 80 milliGRAM(s) Oral at bedtime  bisacodyl Suppository 10 milliGRAM(s) Rectal once  chlorhexidine 0.12% Liquid 15 milliLiter(s) Oral Mucosa every 12 hours  chlorhexidine 2% Cloths 1 Application(s) Topical daily  chlorhexidine 4% Liquid 1 Application(s) Topical <User Schedule>  dextrose 50% Injectable 50 milliLiter(s) IV Push every 15 minutes  epoetin ignacia (EPOGEN) Injectable 00920 Unit(s) IV Push <User Schedule>  gabapentin 100 milliGRAM(s) Oral every 12 hours  hydrALAZINE 10 milliGRAM(s) Oral every 8 hours  insulin lispro (ADMELOG) corrective regimen sliding scale   SubCutaneous every 6 hours  Nephro-elicia 1 Tablet(s) Oral daily  pantoprazole  Injectable 40 milliGRAM(s) IV Push daily  sodium chloride 0.65% Nasal 1 Spray(s) Both Nostrils every 12 hours  sodium chloride 0.9%. 1000 milliLiter(s) (10 mL/Hr) IV Continuous <Continuous>  traZODone 100 milliGRAM(s) Oral at bedtime    MEDICATIONS  (PRN):  benzocaine 20% Spray 1 Spray(s) Topical every 6 hours PRN throat pain  lidocaine/prilocaine Cream 1 Application(s) Topical daily PRN pre-HD  sodium chloride 0.9% lock flush 10 milliLiter(s) IV Push every 1 hour PRN Pre/post blood products, medications, blood draw, and to maintain line patency      FAMILY HISTORY:  FH: hypertension  mother, father- alive    FH: type 2 diabetes  mother, father- alive        SOCIAL HISTORY:    CIGARETTES:    ALCOHOL:    REVIEW OF SYSTEMS:  CONSTITUTIONAL: No fever, weight loss, or fatigue  EYES: No eye pain, visual disturbances, or discharge  ENMT:  No difficulty hearing, tinnitus, vertigo; No sinus or throat pain  NECK: No pain or stiffness  RESPIRATORY: No cough, wheezing, chills or hemoptysis; No shortness of breath  CARDIOVASCULAR: No chest pain, palpitations, dizziness, or leg swelling  GASTROINTESTINAL: No abdominal or epigastric pain. No nausea, vomiting, or hematemesis; No diarrhea or constipation. No melena or hematochezia.  GENITOURINARY: No dysuria, frequency, hematuria, or incontinence  NEUROLOGICAL: No headaches, memory loss, loss of strength, numbness, or tremors  SKIN: No itching, burning, rashes, or lesions   LYMPH NODES: No enlarged glands  ENDOCRINE: No heat or cold intolerance; No hair loss  MUSCULOSKELETAL: No joint pain or swelling; No muscle, back, or extremity pain  PSYCHIATRIC: No depression, anxiety, mood swings, or difficulty sleeping  HEME/LYMPH: No easy bruising, or bleeding gums  ALLERY AND IMMUNOLOGIC: No hives or eczema    Vital Signs Last 24 Hrs  T(C): 36.6 (19 Jan 2025 16:00), Max: 37.4 (18 Jan 2025 20:00)  T(F): 97.9 (19 Jan 2025 16:00), Max: 99.3 (18 Jan 2025 20:00)  HR: 72 (19 Jan 2025 18:04) (66 - 86)  BP: --  BP(mean): --  RR: 15 (19 Jan 2025 17:00) (14 - 35)  SpO2: 93% (19 Jan 2025 18:04) (92% - 100%)    Parameters below as of 19 Jan 2025 16:00  Patient On (Oxygen Delivery Method): ventilator    O2 Concentration (%): 40      PHYSICAL EXAM:  GENERAL: NAD, well-groomed, well-developed  HEAD:  Atraumatic, Normocephalic  EYES: EOMI, PERRLA, conjunctiva and sclera clear  ENMT: No tonsillar erythema, exudates, or enlargement; Moist mucous membranes, Good dentition, No lesions  NECK: Supple, No JVD, Normal thyroid  NERVOUS SYSTEM:  Alert & Oriented X3, Good concentration; Motor Strength 5/5 B/L upper and lower extremities; DTRs 2+ intact and symmetric  CHEST/LUNG: Clear to percussion bilaterally; No rales, rhonchi, wheezing, or rubs  HEART: Regular rate and rhythm; No murmurs, rubs, or gallops  ABDOMEN: Soft, Nontender, Nondistended; Bowel sounds present  EXTREMITIES:  2+ Peripheral Pulses, No clubbing, cyanosis, or edema  LYMPH: No lymphadenopathy noted  SKIN: No rashes or lesions      INTERPRETATION OF TELEMETRY:    ECG:    JYOTI:     LABS:                        7.6    9.52  )-----------( 287      ( 19 Jan 2025 00:21 )             24.3     01-19    136  |  97  |  43[H]  ----------------------------<  120[H]  3.9   |  24  |  5.37[H]    Ca    8.1[L]      19 Jan 2025 00:21  Phos  4.1     01-19  Mg     2.4     01-19    TPro  5.5[L]  /  Alb  2.6[L]  /  TBili  0.5  /  DBili  x   /  AST  20  /  ALT  10  /  AlkPhos  81  01-19        PT/INR - ( 18 Jan 2025 00:51 )   PT: 15.3 sec;   INR: 1.33 ratio         PTT - ( 18 Jan 2025 00:51 )  PTT:41.0 sec  Urinalysis Basic - ( 19 Jan 2025 00:21 )    Color: x / Appearance: x / SG: x / pH: x  Gluc: 120 mg/dL / Ketone: x  / Bili: x / Urobili: x   Blood: x / Protein: x / Nitrite: x   Leuk Esterase: x / RBC: x / WBC x   Sq Epi: x / Non Sq Epi: x / Bacteria: x      Lipid Panel:   I&O's Summary    18 Jan 2025 07:01  -  19 Jan 2025 07:00  --------------------------------------------------------  IN: 1343.3 mL / OUT: 0 mL / NET: 1343.3 mL    19 Jan 2025 07:01  -  19 Jan 2025 18:23  --------------------------------------------------------  IN: 650 mL / OUT: 0 mL / NET: 650 mL        RADIOLOGY & ADDITIONAL STUDIES:    < from: TTE W or WO Ultrasound Enhancing Agent (01.19.25 @ 09:06) >    TRANSTHORACIC ECHOCARDIOGRAM REPORT  ________________________________________________________________________________                                      _______       Pt. Name:       ALAINA CANO Study Date:    1/19/2025  MRN:            [id number]       YOB: 1970  Accession #:    [id number]        Age:           54 years  Account#:       [id number]     Gender:        M  Heart Rate:                      Height:        71.00 in (180.34 cm)  Rhythm:Weight:        187.00 lb (84.82 kg)  Blood Pressure: 120/50 mmHg      BSA/BMI:       2.05 m² / 26.08 kg/m²  ________________________________________________________________________________________  Referring Physician:    [id number] Karan Rivers  Interpreting Physician: Bozena Suh MD  Primary Sonographer:    Layla MCLEAN    CPT:               ECHO TTE W/O CON F/U LTD - 39273.m  Indication(s):     Malignant pericardial effusion in diseases classified                     elsewhere - I31.31  Procedure:         Limited transthoracic echocardiogram.  Ordering Location: [facility]  Admission Status:  Inpatient  Study Information: Image quality for this study is technically difficult.    _______________________________________________________________________________________     CONCLUSIONS:      1. Technically difficult image quality.   2. No pericardial effusion seen.    ________________________________________________________________________________________  FINDINGS:     Pericardium:  No pericardial effusion seen.  ________________________________________________________________________________________  Electronically signed on 1/19/2025 at 4:42:24 PM by Bozena Suh MD      *** Final ***    < end of copied text >

## 2025-01-19 NOTE — PROGRESS NOTE ADULT - ASSESSMENT
54M s/p CABG x 3 on 12/30/24 > postop c/b acute respiratory failure 2/2 E coli bacteremia 2/2 pneumonia. Now s/p trach creation 1/16.     Plan:  - Noted blood around trach site, will monitor and keep gauze in place for now  - Trach sutures to remain in place until POD5 1/21  - surgery to follow   - appreciate care per CTICU     ACS/Trauma Surgery  [phone].

## 2025-01-19 NOTE — PROGRESS NOTE ADULT - SUBJECTIVE AND OBJECTIVE BOX
[facility] [place] NEPHROLOGY PRACTICE   MD DREW VIZCARRA MD RUORU WONG, PA    TEL:  FROM 9 AM to 5 PM ---OFFICE: [phone]  AVAILABLE ON TEAMS    FROM 5 PM - 9 AM PLEASE CALL ANSWERING SERVICE: [phone]    RENAL FOLLOW UP NOTE--Date of Service 01-19-25 @ 08:20  --------------------------------------------------------------------------------  HPI:      Pt seen and examined at bedside.       PAST HISTORY  --------------------------------------------------------------------------------  No significant changes to PMH, PSH, FHx, SHx, unless otherwise noted    ALLERGIES & MEDICATIONS  --------------------------------------------------------------------------------  Allergies    No Known Allergies    Intolerances      Standing Inpatient Medications  acetylcysteine 20%  Inhalation 4 milliLiter(s) Inhalation every 6 hours  albuterol    0.083% 2.5 milliGRAM(s) Nebulizer every 6 hours  aMIOdarone    Tablet   Oral   aMIOdarone    Tablet 200 milliGRAM(s) Oral daily  ascorbic acid 500 milliGRAM(s) Oral daily  aspirin  chewable 81 milliGRAM(s) Oral daily  atorvastatin 80 milliGRAM(s) Oral at bedtime  bisacodyl Suppository 10 milliGRAM(s) Rectal once  chlorhexidine 0.12% Liquid 15 milliLiter(s) Oral Mucosa every 12 hours  chlorhexidine 2% Cloths 1 Application(s) Topical daily  chlorhexidine 4% Liquid 1 Application(s) Topical <User Schedule>  dextrose 50% Injectable 50 milliLiter(s) IV Push every 15 minutes  epoetin ignacia (EPOGEN) Injectable 18525 Unit(s) IV Push <User Schedule>  gabapentin 100 milliGRAM(s) Oral every 12 hours  hydrALAZINE 10 milliGRAM(s) Oral every 8 hours  insulin lispro (ADMELOG) corrective regimen sliding scale   SubCutaneous every 6 hours  meropenem  IVPB 500 milliGRAM(s) IV Intermittent every 24 hours  Nephro-elicia 1 Tablet(s) Oral daily  pantoprazole  Injectable 40 milliGRAM(s) IV Push daily  sodium chloride 0.65% Nasal 1 Spray(s) Both Nostrils every 12 hours  sodium chloride 0.9%. 1000 milliLiter(s) IV Continuous <Continuous>  tobramycin for Nebulization 300 milliGRAM(s) Inhalation every 12 hours  traZODone 100 milliGRAM(s) Oral at bedtime  vancomycin    Solution 125 milliGRAM(s) Oral every 12 hours    PRN Inpatient Medications  benzocaine 20% Spray 1 Spray(s) Topical every 6 hours PRN  lidocaine/prilocaine Cream 1 Application(s) Topical daily PRN  sodium chloride 0.9% lock flush 10 milliLiter(s) IV Push every 1 hour PRN      REVIEW OF SYSTEMS  --------------------------------------------------------------------------------  General: no fever  MSK: no edema     VITALS/PHYSICAL EXAM  --------------------------------------------------------------------------------  T(C): 37.3 (01-19-25 @ 08:00), Max: 37.4 (01-18-25 @ 20:00)  HR: 82 (01-19-25 @ 08:00) (67 - 88)  BP: --112/67  RR: 25 (01-19-25 @ 08:00) (14 - 60)  SpO2: 100% (01-19-25 @ 08:00) (90% - 100%)  Wt(kg): --        01-18-25 @ 07:01  -  01-19-25 @ 07:00  --------------------------------------------------------  IN: 1343.3 mL / OUT: 0 mL / NET: 1343.3 mL    01-19-25 @ 07:01  -  01-19-25 @ 08:21  --------------------------------------------------------  IN: 55 mL / OUT: 0 mL / NET: 55 mL      Physical Exam:  	Constitutional: NAD  Neck: No JVD  Respiratory: CTAB, no wheezes, rales or rhonchi  Cardiovascular: S1, S2, RRR  Gastrointestinal: BS+, soft, NT/ND  Extremities: No peripheral edema  LABS/STUDIES  --------------------------------------------------------------------------------              7.6    9.52  >-----------<  287      [01-19-25 @ 00:21]              24.3     136  |  97  |  43  ----------------------------<  120      [01-19-25 @ 00:21]  3.9   |  24  |  5.37        Ca     8.1     [01-19-25 @ 00:21]      Mg     2.4     [01-19-25 @ 00:21]      Phos  4.1     [01-19-25 @ 00:21]    TPro  5.5  /  Alb  2.6  /  TBili  0.5  /  DBili  x   /  AST  20  /  ALT  10  /  AlkPhos  81  [01-19-25 @ 00:21]    PT/INR: PT 15.3 , INR 1.33       [01-18-25 @ 00:51]  PTT: 41.0       [01-18-25 @ 00:51]      Creatinine Trend:  SCr 5.37 [01-19 @ 00:21]  SCr 4.00 [01-18 @ 00:51]  SCr 5.09 [01-17 @ 00:33]  SCr 3.74 [01-16 @ 00:41]  SCr 4.91 [01-15 @ 00:22]    Urinalysis - [01-19-25 @ 00:21]      Color  / Appearance  / SG  / pH       Gluc 120 / Ketone   / Bili  / Urobili        Blood  / Protein  / Leuk Est  / Nitrite       RBC  / WBC  / Hyaline  / Gran  / Sq Epi  / Non Sq Epi  / Bacteria       Iron 29, TIBC 143, %sat 20      [12-19-24 @ 05:00]  Ferritin 904      [12-19-24 @ 05:00]  TSH 4.75      [12-24-24 @ 06:50]

## 2025-01-19 NOTE — PROGRESS NOTE ADULT - SUBJECTIVE AND OBJECTIVE BOX
General Surgery Progress Note    Overnight: RAINER  Subjective: Patient seen and examined on rounds.    Objective:  Vitals:  T(C): 37.3 (01-19-25 @ 08:00), Max: 37.4 (01-18-25 @ 20:00)  HR: 82 (01-19-25 @ 08:00) (67 - 88)  BP: --  RR: 25 (01-19-25 @ 08:00) (14 - 60)  SpO2: 100% (01-19-25 @ 08:00) (90% - 100%)  Wt(kg): --    01-18 @ 07:01 - 01-19 @ 07:00  --------------------------------------------------------  IN:    Dexmedetomidine: 25.5 mL    DOBUTamine: 7.8 mL    Enteral Tube Flush: 280 mL    IV PiggyBack: 200 mL    Nepro with Carb Steady: 720 mL    sodium chloride 0.9%: 110 mL  Total IN: 1343.3 mL    OUT:    Propofol: 0 mL  Total OUT: 0 mL    Total NET: 1343.3 mL      01-19 @ 07:01 - 01-19 @ 08:24  --------------------------------------------------------  IN:    Nepro with Carb Steady: 50 mL    sodium chloride 0.9%: 5 mL  Total IN: 55 mL    OUT:  Total OUT: 0 mL    Total NET: 55 mL          Physical Exam:  General: NAD, resting comfortably in bed  HEENT: Trach in place secured, some mild strikethrough on gauze with less oozing noted from skin       Labs:                        7.6    9.52  )-----------( 287      ( 19 Jan 2025 00:21 )             24.3     01-19    136  |  97  |  43[H]  ----------------------------<  120[H]  3.9   |  24  |  5.37[H]    Ca    8.1[L]      19 Jan 2025 00:21  Phos  4.1     01-19  Mg     2.4     01-19    TPro  5.5[L]  /  Alb  2.6[L]  /  TBili  0.5  /  DBili  x   /  AST  20  /  ALT  10  /  AlkPhos  81  01-19    LIVER FUNCTIONS - ( 19 Jan 2025 00:21 )  Alb: 2.6 g/dL / Pro: 5.5 g/dL / ALK PHOS: 81 U/L / ALT: 10 U/L / AST: 20 U/L / GGT: x           PT/INR - ( 18 Jan 2025 00:51 )   PT: 15.3 sec;   INR: 1.33 ratio         PTT - ( 18 Jan 2025 00:51 )  PTT:41.0 sec  Urinalysis Basic - ( 19 Jan 2025 00:21 )    Color: x / Appearance: x / SG: x / pH: x  Gluc: 120 mg/dL / Ketone: x  / Bili: x / Urobili: x   Blood: x / Protein: x / Nitrite: x   Leuk Esterase: x / RBC: x / WBC x   Sq Epi: x / Non Sq Epi: x / Bacteria: x

## 2025-01-20 LAB
ACANTHOCYTES BLD QL SMEAR: SLIGHT — SIGNIFICANT CHANGE UP
ALBUMIN SERPL ELPH-MCNC: 2.6 G/DL — LOW (ref 3.3–5)
ALP SERPL-CCNC: 91 U/L — SIGNIFICANT CHANGE UP (ref 40–120)
ALT FLD-CCNC: 9 U/L — LOW (ref 10–45)
ANION GAP SERPL CALC-SCNC: 15 MMOL/L — SIGNIFICANT CHANGE UP (ref 5–17)
ANISOCYTOSIS BLD QL: SIGNIFICANT CHANGE UP
AST SERPL-CCNC: 18 U/L — SIGNIFICANT CHANGE UP (ref 10–40)
BASOPHILS # BLD AUTO: 0 K/UL — SIGNIFICANT CHANGE UP (ref 0–0.2)
BASOPHILS NFR BLD AUTO: 0 % — SIGNIFICANT CHANGE UP (ref 0–2)
BILIRUB SERPL-MCNC: 0.5 MG/DL — SIGNIFICANT CHANGE UP (ref 0.2–1.2)
BLD GP AB SCN SERPL QL: NEGATIVE — SIGNIFICANT CHANGE UP
CALCIUM SERPL-MCNC: 8.5 MG/DL — SIGNIFICANT CHANGE UP (ref 8.4–10.5)
CHLORIDE SERPL-SCNC: 96 MMOL/L — SIGNIFICANT CHANGE UP (ref 96–108)
CO2 SERPL-SCNC: 22 MMOL/L — SIGNIFICANT CHANGE UP (ref 22–31)
CREAT SERPL-MCNC: 6.64 MG/DL — HIGH (ref 0.5–1.3)
DACRYOCYTES BLD QL SMEAR: SLIGHT — SIGNIFICANT CHANGE UP
EGFR: 9 ML/MIN/1.73M2 — LOW
EGFR: 9 ML/MIN/1.73M2 — LOW
EOSINOPHIL # BLD AUTO: 0.54 K/UL — HIGH (ref 0–0.5)
EOSINOPHIL NFR BLD AUTO: 5.2 % — SIGNIFICANT CHANGE UP (ref 0–6)
GAS PNL BLDA: SIGNIFICANT CHANGE UP
GLUCOSE SERPL-MCNC: 127 MG/DL — HIGH (ref 70–99)
HCT VFR BLD CALC: 24.6 % — LOW (ref 39–50)
HGB BLD-MCNC: 7.8 G/DL — LOW (ref 13–17)
LYMPHOCYTES # BLD AUTO: 0.46 K/UL — LOW (ref 1–3.3)
LYMPHOCYTES # BLD AUTO: 4.4 % — LOW (ref 13–44)
MACROCYTES BLD QL: SIGNIFICANT CHANGE UP
MAGNESIUM SERPL-MCNC: 2.6 MG/DL — SIGNIFICANT CHANGE UP (ref 1.6–2.6)
MANUAL SMEAR VERIFICATION: SIGNIFICANT CHANGE UP
MCHC RBC-ENTMCNC: 29.9 PG — SIGNIFICANT CHANGE UP (ref 27–34)
MCHC RBC-ENTMCNC: 31.7 G/DL — LOW (ref 32–36)
MCV RBC AUTO: 94.3 FL — SIGNIFICANT CHANGE UP (ref 80–100)
MONOCYTES # BLD AUTO: 0.46 K/UL — SIGNIFICANT CHANGE UP (ref 0–0.9)
MONOCYTES NFR BLD AUTO: 4.4 % — SIGNIFICANT CHANGE UP (ref 2–14)
NEUTROPHILS # BLD AUTO: 8.97 K/UL — HIGH (ref 1.8–7.4)
NEUTROPHILS NFR BLD AUTO: 86 % — HIGH (ref 43–77)
OVALOCYTES BLD QL SMEAR: SLIGHT — SIGNIFICANT CHANGE UP
PHOSPHATE SERPL-MCNC: 5 MG/DL — HIGH (ref 2.5–4.5)
PLAT MORPH BLD: ABNORMAL
PLATELET # BLD AUTO: 297 K/UL — SIGNIFICANT CHANGE UP (ref 150–400)
POIKILOCYTOSIS BLD QL AUTO: SLIGHT — SIGNIFICANT CHANGE UP
POLYCHROMASIA BLD QL SMEAR: SLIGHT — SIGNIFICANT CHANGE UP
POTASSIUM SERPL-MCNC: 4.1 MMOL/L — SIGNIFICANT CHANGE UP (ref 3.5–5.3)
POTASSIUM SERPL-SCNC: 4.1 MMOL/L — SIGNIFICANT CHANGE UP (ref 3.5–5.3)
PROT SERPL-MCNC: 5.6 G/DL — LOW (ref 6–8.3)
RBC # BLD: 2.61 M/UL — LOW (ref 4.2–5.8)
RBC # FLD: 19.2 % — HIGH (ref 10.3–14.5)
RBC BLD AUTO: ABNORMAL
RH IG SCN BLD-IMP: POSITIVE — SIGNIFICANT CHANGE UP
SODIUM SERPL-SCNC: 133 MMOL/L — LOW (ref 135–145)
WBC # BLD: 10.43 K/UL — SIGNIFICANT CHANGE UP (ref 3.8–10.5)
WBC # FLD AUTO: 10.43 K/UL — SIGNIFICANT CHANGE UP (ref 3.8–10.5)

## 2025-01-20 PROCEDURE — 31645 BRNCHSC W/THER ASPIR 1ST: CPT

## 2025-01-20 PROCEDURE — 76604 US EXAM CHEST: CPT | Mod: 26

## 2025-01-20 PROCEDURE — 71045 X-RAY EXAM CHEST 1 VIEW: CPT | Mod: 26

## 2025-01-20 PROCEDURE — 71045 X-RAY EXAM CHEST 1 VIEW: CPT | Mod: 26,77

## 2025-01-20 PROCEDURE — 99291 CRITICAL CARE FIRST HOUR: CPT | Mod: 25

## 2025-01-20 RX ORDER — PROPOFOL 10 MG/ML
150 INJECTION, EMULSION INTRAVENOUS ONCE
Refills: 0 | Status: COMPLETED | OUTPATIENT
Start: 2025-01-20 | End: 2025-01-20

## 2025-01-20 RX ORDER — PROPOFOL 10 MG/ML
50 INJECTION, EMULSION INTRAVENOUS ONCE
Refills: 0 | Status: DISCONTINUED | OUTPATIENT
Start: 2025-01-20 | End: 2025-01-20

## 2025-01-20 RX ORDER — FENTANYL CITRATE-0.9 % NACL/PF 100MCG/2ML
100 SYRINGE (ML) INTRAVENOUS ONCE
Refills: 0 | Status: DISCONTINUED | OUTPATIENT
Start: 2025-01-20 | End: 2025-01-20

## 2025-01-20 RX ADMIN — Medication 100 MILLIGRAM(S): at 21:48

## 2025-01-20 RX ADMIN — Medication 2.5 MILLIGRAM(S): at 05:09

## 2025-01-20 RX ADMIN — GABAPENTIN 100 MILLIGRAM(S): 400 CAPSULE ORAL at 05:19

## 2025-01-20 RX ADMIN — Medication 10 MILLIGRAM(S): at 14:12

## 2025-01-20 RX ADMIN — Medication 10 MILLIGRAM(S): at 20:39

## 2025-01-20 RX ADMIN — GABAPENTIN 100 MILLIGRAM(S): 400 CAPSULE ORAL at 17:35

## 2025-01-20 RX ADMIN — HEPARIN SODIUM 5000 UNIT(S): 1000 INJECTION INTRAVENOUS; SUBCUTANEOUS at 21:48

## 2025-01-20 RX ADMIN — Medication 2.5 MILLIGRAM(S): at 23:04

## 2025-01-20 RX ADMIN — ATORVASTATIN CALCIUM 80 MILLIGRAM(S): 80 TABLET, FILM COATED ORAL at 21:48

## 2025-01-20 RX ADMIN — Medication 40 MILLIGRAM(S): at 12:28

## 2025-01-20 RX ADMIN — Medication 500 MILLIGRAM(S): at 12:28

## 2025-01-20 RX ADMIN — Medication 10 MILLIGRAM(S): at 05:19

## 2025-01-20 RX ADMIN — HEPARIN SODIUM 5000 UNIT(S): 1000 INJECTION INTRAVENOUS; SUBCUTANEOUS at 14:11

## 2025-01-20 RX ADMIN — Medication 100 MICROGRAM(S): at 09:45

## 2025-01-20 RX ADMIN — Medication 81 MILLIGRAM(S): at 12:27

## 2025-01-20 RX ADMIN — Medication 1 TABLET(S): at 12:28

## 2025-01-20 RX ADMIN — Medication 4 MILLILITER(S): at 17:30

## 2025-01-20 RX ADMIN — Medication 1 SPRAY(S): at 05:19

## 2025-01-20 RX ADMIN — Medication 15 MILLILITER(S): at 05:19

## 2025-01-20 RX ADMIN — HEPARIN SODIUM 5000 UNIT(S): 1000 INJECTION INTRAVENOUS; SUBCUTANEOUS at 05:19

## 2025-01-20 RX ADMIN — ACETYLCYSTEINE 4 MILLILITER(S): 200 INHALANT RESPIRATORY (INHALATION) at 05:09

## 2025-01-20 RX ADMIN — Medication 1 APPLICATION(S): at 05:20

## 2025-01-20 RX ADMIN — EPOETIN ALFA 10000 UNIT(S): 10000 SOLUTION INTRAVENOUS; SUBCUTANEOUS at 13:30

## 2025-01-20 RX ADMIN — Medication 1 SPRAY(S): at 17:35

## 2025-01-20 RX ADMIN — Medication 15 MILLILITER(S): at 17:35

## 2025-01-20 RX ADMIN — Medication 2.5 MILLIGRAM(S): at 17:20

## 2025-01-20 RX ADMIN — Medication 2.5 MILLIGRAM(S): at 11:28

## 2025-01-20 RX ADMIN — Medication 1 APPLICATION(S): at 21:34

## 2025-01-20 RX ADMIN — PROPOFOL 150 MILLIGRAM(S): 10 INJECTION, EMULSION INTRAVENOUS at 09:45

## 2025-01-20 RX ADMIN — ACETYLCYSTEINE 4 MILLILITER(S): 200 INHALANT RESPIRATORY (INHALATION) at 11:29

## 2025-01-20 RX ADMIN — Medication 100 MICROGRAM(S): at 10:00

## 2025-01-20 NOTE — PROGRESS NOTE ADULT - SUBJECTIVE AND OBJECTIVE BOX
[facility] [place] [facility]ROLOGY PRACTICE   MD DREW VIZCARRA MD RUORU WONG, PA    TEL:  FROM 9 AM to 5 PM ---OFFICE: [phone]  AVAILABLE ON TEAMS    FROM 5 PM - 9 AM PLEASE CALL ANSWERING SERVICE: [phone]    RENAL FOLLOW UP NOTE--Date of Service 01-20-25 @ 09:02  --------------------------------------------------------------------------------  HPI:      Pt seen and examined at bedside.       PAST HISTORY  --------------------------------------------------------------------------------  No significant changes to PMH, PSH, FHx, SHx, unless otherwise noted    ALLERGIES & MEDICATIONS  --------------------------------------------------------------------------------  Allergies    No Known Allergies    Intolerances      Standing Inpatient Medications  acetylcysteine 20%  Inhalation 4 milliLiter(s) Inhalation every 6 hours  albuterol    0.083% 2.5 milliGRAM(s) Nebulizer every 6 hours  aMIOdarone    Tablet   Oral   aMIOdarone    Tablet 200 milliGRAM(s) Oral daily  ascorbic acid 500 milliGRAM(s) Oral daily  aspirin  chewable 81 milliGRAM(s) Oral daily  atorvastatin 80 milliGRAM(s) Oral at bedtime  bisacodyl Suppository 10 milliGRAM(s) Rectal once  chlorhexidine 0.12% Liquid 15 milliLiter(s) Oral Mucosa every 12 hours  chlorhexidine 2% Cloths 1 Application(s) Topical daily  chlorhexidine 4% Liquid 1 Application(s) Topical <User Schedule>  dextrose 50% Injectable 50 milliLiter(s) IV Push every 15 minutes  epoetin ignacia (EPOGEN) Injectable 69918 Unit(s) IV Push <User Schedule>  gabapentin 100 milliGRAM(s) Oral every 12 hours  heparin   Injectable 5000 Unit(s) SubCutaneous every 8 hours  hydrALAZINE 10 milliGRAM(s) Oral every 8 hours  insulin lispro (ADMELOG) corrective regimen sliding scale   SubCutaneous every 6 hours  Nephro-elicia 1 Tablet(s) Oral daily  pantoprazole  Injectable 40 milliGRAM(s) IV Push daily  sodium chloride 0.65% Nasal 1 Spray(s) Both Nostrils every 12 hours  sodium chloride 0.9%. 1000 milliLiter(s) IV Continuous <Continuous>  traZODone 100 milliGRAM(s) Oral at bedtime    PRN Inpatient Medications  benzocaine 20% Spray 1 Spray(s) Topical every 6 hours PRN  lidocaine/prilocaine Cream 1 Application(s) Topical daily PRN  sodium chloride 0.9% lock flush 10 milliLiter(s) IV Push every 1 hour PRN      REVIEW OF SYSTEMS  --------------------------------------------------------------------------------  General: no fever  MSK: no edema     VITALS/PHYSICAL EXAM  --------------------------------------------------------------------------------  T(C): 37 (01-20-25 @ 08:00), Max: 37 (01-20-25 @ 08:00)  HR: 75 (01-20-25 @ 08:00) (59 - 82)  BP: --112/67  RR: 23 (01-20-25 @ 08:00) (14 - 35)  SpO2: 98% (01-20-25 @ 08:00) (92% - 100%)  Wt(kg): --        01-19-25 @ 07:01  -  01-20-25 @ 07:00  --------------------------------------------------------  IN: 1465 mL / OUT: 0 mL / NET: 1465 mL    01-20-25 @ 07:01  -  01-20-25 @ 09:02  --------------------------------------------------------  IN: 10 mL / OUT: 0 mL / NET: 10 mL      Physical Exam:  	Constitutional: NAD  Neck: No JVD  Respiratory: CTAB, no wheezes, rales or rhonchi  Cardiovascular: S1, S2, RRR  Gastrointestinal: BS+, soft, NT/ND  Extremities: No peripheral edema  LABS/STUDIES  --------------------------------------------------------------------------------              7.8    10.43 >-----------<  297      [01-20-25 @ 00:29]              24.6     133  |  96  |  52  ----------------------------<  127      [01-20-25 @ 00:29]  4.1   |  22  |  6.64        Ca     8.5     [01-20-25 @ 00:29]      Mg     2.6     [01-20-25 @ 00:29]      Phos  5.0     [01-20-25 @ 00:29]    TPro  5.6  /  Alb  2.6  /  TBili  0.5  /  DBili  x   /  AST  18  /  ALT  9   /  AlkPhos  91  [01-20-25 @ 00:29]          Creatinine Trend:  SCr 6.64 [01-20 @ 00:29]  SCr 5.37 [01-19 @ 00:21]  SCr 4.00 [01-18 @ 00:51]  SCr 5.09 [01-17 @ 00:33]  SCr 3.74 [01-16 @ 00:41]    Urinalysis - [01-20-25 @ 00:29]      Color  / Appearance  / SG  / pH       Gluc 127 / Ketone   / Bili  / Urobili        Blood  / Protein  / Leuk Est  / Nitrite       RBC  / WBC  / Hyaline  / Gran  / Sq Epi  / Non Sq Epi  / Bacteria       Iron 29, TIBC 143, %sat 20      [12-19-24 @ 05:00]  Ferritin 904      [12-19-24 @ 05:00]  TSH 4.75      [12-24-24 @ 06:50]

## 2025-01-20 NOTE — PROGRESS NOTE ADULT - ASSESSMENT
54M with PMH of HTN, HLD, ESRD on HD MWF via LUE AVF, ascites (requiring repeat paracenteses q4-6wks), NICM with moderate LV dysfunction w/ EF 35-40%(2023) and CAD s/p atherectomy + SALLIE to D1(4/11/2024) presents to [facility] transferred from [facility] for CABG eval  Nephro on [facility] for HD    ESRD on HD   Regular schedule MWF   Access LUE AVF  [facility]  Nephrologist Dr. Baiely  Last HD on 12/24  S/p HD on 12/27 12/29, 12/30 for hyperkalemia and 12/31  2 L PUF On 01/02/2024  However hospital course now complicated by Cardiogenic shock now on multiple pressors,   CRRT initiated 01/03, off since 01/08   S/p PUF on 01/09   HD held on 01/10 due to instability,  S/p HD on 01/17  Plan for HD today  Keep MAP>65  Renal Diet  Consent in physical chart    Hyper/Hypokalemia  Monitor K, will change dialysate fluid as needed    HTN  currently on pressure support  monitor bp     CKD MBD  Can send a PTH  Ca and Phos daily    Anemia  CRISTIANE with HD  Transfuse if hgb <7    CAD with multivessel disease  s/p CABG 12/30  CTICU following    Hypoxic Resp failure requiring Trach  Per surgery

## 2025-01-20 NOTE — PROCEDURE NOTE - NSBRONCHPROCDETAILS_GEN_A_CORE_FT
Indication: Loss of left lung volumes on chest xray     History: s/p tracheostomy on 1/16 with persistent Left lung collapse     Preop medication: Mucomyst, albuterol     Xray Findings: Left lobe atelectasis      Findings:  Bronchoscope inserted through ETT. ETT noted to be in good position. Airway evaluation revealed Sharp Abena. Left main stem with large thick white mucus plug, AUGUSTO and LLL evaluation revealed minimal secretions with lavage, airways patent, non-erythematous. RUL, RML, RLL revealed minimal secretions. Bronchoscope then withdrawn from ETT.     Specimens: BAL sent     Post bronchoscopy xray: improved Left lung field size

## 2025-01-20 NOTE — PROGRESS NOTE ADULT - SUBJECTIVE AND OBJECTIVE BOX
PATIENT SEEN AND EXAMINED BY JEAN PAUL GABRIEL M.D. ON :- 1/20/25  DATE OF SERVICE:     1/20/25        Interim events noted,Labs ,Radiological studies and Cardiology tests reviewed .    Patient is a 54y old  Male who presents with a chief complaint of s/p CABG x 3 (20 Jan 2025 07:29)      HPI:  54M with PMH of HTN, HLD, ESRD on HD MWF via LUE AVF, ascites (requiring repeat paracenteses q4-6wks), NICM with moderate LV dysfunction w/ EF 35-40%(2023) and CAD s/p atherectomy + SALLIE to D1(4/11/2024) presents to [facility] transferred from [facility]. Patient initially presented to [facility] ED with shortness of breath. Of note he was recently admitted from 09/27-12/02 for acute cholecystitis s/p cholecystectomy. In [facility] ED, pt was hypoxic to 86% on RA, trop 1.7K, EKG no new changes, CXR fluid overload. Admitted for AHRF 2/2 fluid overload. Pt received HD on 12/18, 12/19, 12/20 and 12/22. DAPT was resumed, TTE showed improvement in LVEF to 40-45%. Stress test was abnormal with 1mm inferior STD, reversible ischemia in the inferior wall and fixed defects in the lateral, anterior and apical walls with post stress EF of 24%. Pt was then transferred to [facility] for cardiac cath which showed pLAD 70% ISR, mLCx 70%, D1 severe dz. Patient now transferred to [facility] CTS Dr. Rivers for CABG eval. Patient denies any current SOB or chest pain on admission. Otherwise denies headaches, abdominal pain, urinary or bowel changes, fevers, chills, N/V/D, sick contacts.  (24 Dec 2024 05:07)      PAST MEDICAL & SURGICAL HISTORY:  Type 2 diabetes mellitus      Hypertension      End stage renal disease  started HD 2/2019 T, Th, Sat via right chest permacath      Bone spur  right shoulder- hx of - sx done      Anemia      Injury of right wrist  hx of at age 14      History of hyperkalemia  before HD- K-6.2 - to repeat in am of sx, pt had HD today      S/P arthroscopy of right shoulder  2010      History of vascular access device  right chest permacath - 2/2019      H/O right wrist surgery  tendons repair - at age 14      AV fistula      H/O ventral hernia repair      S/P cholecystectomy          PREVIOUS DIAGNOSTIC TESTING:      ECHO  FINDINGS:    STRESS  FINDINGS:    CATHETERIZATION  FINDINGS:    MEDICATIONS  (STANDING):  albuterol    0.083% 2.5 milliGRAM(s) Nebulizer every 6 hours  aMIOdarone    Tablet   Oral   aMIOdarone    Tablet 200 milliGRAM(s) Oral daily  ascorbic acid 500 milliGRAM(s) Oral daily  aspirin  chewable 81 milliGRAM(s) Oral daily  atorvastatin 80 milliGRAM(s) Oral at bedtime  bisacodyl Suppository 10 milliGRAM(s) Rectal once  chlorhexidine 0.12% Liquid 15 milliLiter(s) Oral Mucosa every 12 hours  chlorhexidine 2% Cloths 1 Application(s) Topical daily  chlorhexidine 4% Liquid 1 Application(s) Topical <User Schedule>  dextrose 50% Injectable 50 milliLiter(s) IV Push every 15 minutes  epoetin ignacia (EPOGEN) Injectable 91297 Unit(s) IV Push <User Schedule>  gabapentin 100 milliGRAM(s) Oral every 12 hours  heparin   Injectable 5000 Unit(s) SubCutaneous every 8 hours  hydrALAZINE 10 milliGRAM(s) Oral every 8 hours  insulin lispro (ADMELOG) corrective regimen sliding scale   SubCutaneous every 6 hours  Nephro-elicia 1 Tablet(s) Oral daily  pantoprazole  Injectable 40 milliGRAM(s) IV Push daily  sodium chloride 0.65% Nasal 1 Spray(s) Both Nostrils every 12 hours  sodium chloride 0.9%. 1000 milliLiter(s) (10 mL/Hr) IV Continuous <Continuous>  sodium chloride 7% Inhalation 4 milliLiter(s) Inhalation every 12 hours  traZODone 100 milliGRAM(s) Oral at bedtime    MEDICATIONS  (PRN):  benzocaine 20% Spray 1 Spray(s) Topical every 6 hours PRN throat pain  lidocaine/prilocaine Cream 1 Application(s) Topical daily PRN pre-HD  sodium chloride 0.9% lock flush 10 milliLiter(s) IV Push every 1 hour PRN Pre/post blood products, medications, blood draw, and to maintain line patency      FAMILY HISTORY:  FH: hypertension  mother, father- alive    FH: type 2 diabetes  mother, father- alive        SOCIAL HISTORY:    CIGARETTES:    ALCOHOL:    REVIEW OF SYSTEMS:  CONSTITUTIONAL: No fever, weight loss, or fatigue  EYES: No eye pain, visual disturbances, or discharge  ENMT:  No difficulty hearing, tinnitus, vertigo; No sinus or throat pain  NECK: No pain or stiffness  RESPIRATORY: No cough, wheezing, chills or hemoptysis; No shortness of breath  CARDIOVASCULAR: No chest pain, palpitations, dizziness, or leg swelling  GASTROINTESTINAL: No abdominal or epigastric pain. No nausea, vomiting, or hematemesis; No diarrhea or constipation. No melena or hematochezia.  GENITOURINARY: No dysuria, frequency, hematuria, or incontinence  NEUROLOGICAL: No headaches, memory loss, loss of strength, numbness, or tremors  SKIN: No itching, burning, rashes, or lesions   LYMPH NODES: No enlarged glands  ENDOCRINE: No heat or cold intolerance; No hair loss  MUSCULOSKELETAL: No joint pain or swelling; No muscle, back, or extremity pain  PSYCHIATRIC: No depression, anxiety, mood swings, or difficulty sleeping  HEME/LYMPH: No easy bruising, or bleeding gums  ALLERY AND IMMUNOLOGIC: No hives or eczema    Vital Signs Last 24 Hrs  T(C): 36.7 (20 Jan 2025 16:20), Max: 37.1 (20 Jan 2025 12:00)  T(F): 98.1 (20 Jan 2025 16:20), Max: 98.8 (20 Jan 2025 12:00)  HR: 75 (20 Jan 2025 17:00) (58 - 77)  BP: --  BP(mean): --  RR: 24 (20 Jan 2025 17:00) (13 - 35)  SpO2: 97% (20 Jan 2025 17:00) (93% - 100%)    Parameters below as of 20 Jan 2025 16:20  Patient On (Oxygen Delivery Method): ventilator    O2 Concentration (%): 40      PHYSICAL EXAM:  GENERAL: NAD, well-groomed, well-developed  HEAD:  Atraumatic, Normocephalic  EYES: EOMI, PERRLA, conjunctiva and sclera clear  ENMT: No tonsillar erythema, exudates, or enlargement; Moist mucous membranes, Good dentition, No lesions  NECK: Supple, No JVD, Normal thyroid  NERVOUS SYSTEM:  Alert & Oriented X3, Good concentration; Motor Strength 5/5 B/L upper and lower extremities; DTRs 2+ intact and symmetric  CHEST/LUNG: Clear to percussion bilaterally; No rales, rhonchi, wheezing, or rubs  HEART: Regular rate and rhythm; No murmurs, rubs, or gallops  ABDOMEN: Soft, Nontender, Nondistended; Bowel sounds present  EXTREMITIES:  2+ Peripheral Pulses, No clubbing, cyanosis, or edema  LYMPH: No lymphadenopathy noted  SKIN: No rashes or lesions      INTERPRETATION OF TELEMETRY:    ECG:    PALVAS:     LABS:                        7.8    10.43 )-----------( 297      ( 20 Jan 2025 00:29 )             24.6     01-20    133[L]  |  96  |  52[H]  ----------------------------<  127[H]  4.1   |  22  |  6.64[H]    Ca    8.5      20 Jan 2025 00:29  Phos  5.0     01-20  Mg     2.6     01-20    TPro  5.6[L]  /  Alb  2.6[L]  /  TBili  0.5  /  DBili  x   /  AST  18  /  ALT  9[L]  /  AlkPhos  91  01-20          Urinalysis Basic - ( 20 Jan 2025 00:29 )    Color: x / Appearance: x / SG: x / pH: x  Gluc: 127 mg/dL / Ketone: x  / Bili: x / Urobili: x   Blood: x / Protein: x / Nitrite: x   Leuk Esterase: x / RBC: x / WBC x   Sq Epi: x / Non Sq Epi: x / Bacteria: x      Lipid Panel:   I&O's Summary    19 Jan 2025 07:01  -  20 Jan 2025 07:00  --------------------------------------------------------  IN: 1465 mL / OUT: 0 mL / NET: 1465 mL    20 Jan 2025 07:01  -  20 Jan 2025 17:28  --------------------------------------------------------  IN: 356 mL / OUT: 2000 mL / NET: -1644 mL        RADIOLOGY & ADDITIONAL STUDIES:    < from: TTE W or WO Ultrasound Enhancing Agent (01.19.25 @ 09:06) >    TRANSTHORACIC ECHOCARDIOGRAM REPORT  ________________________________________________________________________________                                      _______       Pt. Name:       ALAINA CANO Study Date:    1/19/2025  MRN:            [id number]       YOB: 1970  Accession #:    [id number]        Age:           54 years  Account#:       [id number]     Gender:        M  Heart Rate:                      Height:        71.00 in (180.34 cm)  Rhythm:Weight:        187.00 lb (84.82 kg)  Blood Pressure: 120/50 mmHg      BSA/BMI:       2.05 m² / 26.08 kg/m²  ________________________________________________________________________________________  Referring Physician:    [id number] Karan Rivers  Interpreting Physician: Bozena Suh MD  Primary Sonographer:    Layla MCLEAN    CPT:               ECHO TTE W/O CON F/U LTD - 78582.m  Indication(s):     Malignant pericardial effusion in diseases classified                     elsewhere - I31.31  Procedure:         Limited transthoracic echocardiogram.  Ordering Location: [facility]  Admission Status:  Inpatient  Study Information: Image quality for this study is technically difficult.    _______________________________________________________________________________________     CONCLUSIONS:      1. Technically difficult image quality.   2. No pericardial effusion seen.    ________________________________________________________________________________________  FINDINGS:     Pericardium:  No pericardial effusion seen.  ________________________________________________________________________________________  Electronically signed on 1/19/2025 at 4:42:24 PM by Bozena Suh MD      *** Final ***    < end of copied text >

## 2025-01-20 NOTE — PROGRESS NOTE ADULT - ASSESSMENT
54M with PMH of HTN, HLD, ESRD on HD MWF via LUE AVF, ascites (requiring repeat paracenteses q4-6wks), NICM with moderate LV dysfunction w/ EF 35-40%() and CAD s/p atherectomy + SALLIE to D1(2024) presents to [facility] transferred from [facility]. Patient initially presented to [facility] ED with shortness of breath. Of note he was recently admitted from - for acute cholecystitis s/p cholecystectomy. In [facility] ED, pt was hypoxic to 86% on RA, trop 1.7K, EKG no new changes, CXR fluid overload. Admitted for AHRF 2/2 fluid overload. Pt received HD on , ,  and . DAPT was resumed, TTE showed improvement in LVEF to 40-45%. Stress test was abnormal with 1mm inferior STD, reversible ischemia in the inferior wall and fixed defects in the lateral, anterior and apical walls with post stress EF of 24%.     Pt was then transferred to [facility] for cardiac cath which showed pLAD 70% ISR, mLCx 70%, D1 severe dz.     Patient now transferred to [facility] CTS Dr. Rivers for CABG eval.# CAD s/p NSTEMI  Hx CAD s/p PCI LAD   Presented with ADHF elevated troponins  Positive NST1-Cath- pLAD 70% ISR, mLCx 70%, D1 severe dz.   TTE EF 35% Severe TRWas on Plavix-p2y12- 321 been off Plavix since -POD#1-C3L free LIMA-LAD; SVG-Diag; MXG-dnhpMD-Iuubvaesm on  Sinus rhythm,Amio for AF prophylaxis  -OOB off  Sinus rhythm   -POD#3-On /pressors-EF 25-30% RV failure with transaminitis-Sinus Fdmgwn77/03-POD#4-Was intubated for hypoxia-gradual hypotension on /pressors had IABP inserted this morning  -POD#5-s/p IABP insertion, sepsis/septic shock due to GNR/ECOLI HD cath placed   Bronched yesterday 1/3 - minimal secretions with rust-tinged sputum     ESBL-E Coli bacteremia on meropenam    - POD#7 Atrial Fib ,remains intubated weaning IABP 1:3,off pressors on CRRT  -IABP removed.Remains on vent-Alex 10ppm,off Levo- CVP 10 / MAP 71 / Hct 25.6% / Lactate 1.7-Atrial Fibrillation  -Intubated on Alex 10ppm, gtt, BP better-AF~~90's on Amio-had bronch still febrile Tmax 22163/09-Extubated awake alert on HFNC AF,on Dobutrex-CRRT,Cultures no growth    01/10-OOB on BiPAP Sinus Rhythm on -SR/ MAP 57/ CVP 10/  Hct 26.7 / Lact 1.4  -s/p EDU/DCCV-Sinus rhythm on -SR / MAP 52 / CVP 12/ Hct 25.8 / Lact 0.7-had bronch with BAL Left lung  -Sinus rhythm on -for repeat Bronch-SR / MAP 67 / CVP 11 / Hct 27.4% / Lact 0.8  -s/p Trach on  TTE EF 55%-SR / CVP 2 / MAP 63 / Hct 25.9% / Lactate 0.9        # Acute on Chronic Systolic Heart Failure now improved  EF-35% ,severe TR  Had HD post op  GDMT post op  LVEF improved post bypass but now at 23-30%-BiV dysfunction on  now off pressors  -TTE EF 40%,trace effusion  ECG c/w pericarditis-was on colchicine   01/15-TTE EF 55%    Vascular evaluated  AVGraft /?steal causing RV failure-'' Very low suspicion of steal Sd and AVF causing current clinical state. ''   VA Duplex Hemodialysis Access, Left (25 @ 13:35) >   Arterial flow volume of 1301 mL/m, and the venous flow volume of  1492 mL/m are consistent with a normally functioning dialysis access  fistula.    # Ascites  Hx chronic ascites  Has drainage q4-6 weeks  Last drained -4600mL  ? ascites related to severe TR  Had tqmabxdtjizi-Ubcvjiw-id growth   CT Abdomen 01/10-Large volume ascites-consider paracentesis  Monitor      # ESRD  Now off CRRT transition to HD

## 2025-01-20 NOTE — PROGRESS NOTE ADULT - SUBJECTIVE AND OBJECTIVE BOX
Patient seen and examined at the bedside.    Remains critically ill on continuous ICU monitoring, at risk for life threatening decompensation.  All Labs, data reviewed. Plan of care discussed in length during multi-disciplinary ICU rounds.       Brief Summary:  53 yo M with multiple medical problems including CAD s/p PCI in 2024 s/p CABG x 3 on 24  1: Intubated for hypoxia & left lung white out s/p awake bronch with removal of copious mucopurulent secretions  : s/p IABP insertion, sepsis/septic shock due to E coli   1/15: Emergently reintubated.  : HD and 2.5L UF removal  : d/gladys , PS tolerated      24 Hour events:  Hemodialysis scheduled for tomorrow  Tolerated PS yesterday and will try HFTC today  Heparin held for trach ooze  Dobutamine weaned off yesterday  HD MWF      Objective:  Vital Signs Last 24 Hrs  T(C): 36.2 (2025 00:00), Max: 37.3 (2025 08:00)  T(F): 97.2 (2025 00:00), Max: 99.2 (2025 08:00)  HR: 71 (2025 06:00) (59 - 82)  BP: --  BP(mean): --  RR: 22 (2025 06:00) (14 - 35)  SpO2: 95% (2025 06:00) (92% - 100%)    Parameters below as of 2025 04:00  Patient On (Oxygen Delivery Method): ventilator    O2 Concentration (%): 50    Mode: AC/ CMV (Assist Control/ Continuous Mandatory Ventilation)  RR (machine): 22  FiO2: 40  PEEP: 8  ITime: 1  MAP: 11  PC: 15  PIP: 24              Physical Exam:   General: Alert and interactive, trach to vent.  Neurology: Oriented, following commands. Currently on sedation  Respiratory: Breath sounds bilaterally  CV: Sinus  Abdominal: Soft, Nontender  Extremities: Warm, well-perfused  Right arm tender, but stable  -------------------------------------------------------------------------------------------------------------------------------    Labs:                        7.8    10.43 )-----------( 297      ( 2025 00:29 )             24.6     01-    133[L]  |  96  |  52[H]  ----------------------------<  127[H]  4.1   |  22  |  6.64[H]    Ca    8.5      2025 00:29  Phos  5.0       Mg     2.6         TPro  5.6[L]  /  Alb  2.6[L]  /  TBili  0.5  /  DBili  x   /  AST  18  /  ALT  9[L]  /  AlkPhos  91      LIVER FUNCTIONS - ( 2025 00:29 )  Alb: 2.6 g/dL / Pro: 5.6 g/dL / ALK PHOS: 91 U/L / ALT: 9 U/L / AST: 18 U/L / GGT: x             ABG - ( 2025 00:10 )  pH, Arterial: 7.48  pH, Blood: x     /  pCO2: 36    /  pO2: 93    / HCO3: 27    / Base Excess: 3.2   /  SaO2: 98.8      ALL MEDICATIONS   MEDICATIONS  (STANDING):  acetylcysteine 20%  Inhalation 4 milliLiter(s) Inhalation every 6 hours  albuterol    0.083% 2.5 milliGRAM(s) Nebulizer every 6 hours  aMIOdarone    Tablet   Oral   aMIOdarone    Tablet 200 milliGRAM(s) Oral daily  ascorbic acid 500 milliGRAM(s) Oral daily  aspirin  chewable 81 milliGRAM(s) Oral daily  atorvastatin 80 milliGRAM(s) Oral at bedtime  bisacodyl Suppository 10 milliGRAM(s) Rectal once  chlorhexidine 0.12% Liquid 15 milliLiter(s) Oral Mucosa every 12 hours  chlorhexidine 2% Cloths 1 Application(s) Topical daily  chlorhexidine 4% Liquid 1 Application(s) Topical <User Schedule>  dextrose 50% Injectable 50 milliLiter(s) IV Push every 15 minutes  epoetin ignacia (EPOGEN) Injectable 25628 Unit(s) IV Push <User Schedule>  gabapentin 100 milliGRAM(s) Oral every 12 hours  heparin   Injectable 5000 Unit(s) SubCutaneous every 8 hours  hydrALAZINE 10 milliGRAM(s) Oral every 8 hours  insulin lispro (ADMELOG) corrective regimen sliding scale   SubCutaneous every 6 hours  Nephro-elicia 1 Tablet(s) Oral daily  pantoprazole  Injectable 40 milliGRAM(s) IV Push daily  sodium chloride 0.65% Nasal 1 Spray(s) Both Nostrils every 12 hours  sodium chloride 0.9%. 1000 milliLiter(s) (10 mL/Hr) IV Continuous <Continuous>  traZODone 100 milliGRAM(s) Oral at bedtime    MEDICATIONS  (PRN):  benzocaine 20% Spray 1 Spray(s) Topical every 6 hours PRN throat pain  lidocaine/prilocaine Cream 1 Application(s) Topical daily PRN pre-HD  sodium chloride 0.9% lock flush 10 milliLiter(s) IV Push every 1 hour PRN Pre/post blood products, medications, blood draw, and to maintain line patency    ------------------------------------------------------------------------------------------------------------------------------  Assessment:  53 yo M s/p CABG x 3 on 24    Postop acute respiratory failure  Acute blood loss anemia  Ascites    ESRD on iHD   E coli bacteremia 2/2 pneumonia    Plan:  ***Neuro***  Postoperative acute pain control with Gabapentin and prns.  Trazodone nightly  PT ongoing    ***Cardiovascular***  s/p CABG x 3  Remains on low dose Dobutamine 1 --> d/c today  A fib s/p cardioversion - Amiodarone daily.  Hydralazine for hypertension.  ASA / Statin daily    ***Pulmonary***  Postoperative acute respiratory failure  Tracheostomy    s/p Bronchoscopy   HFTC in AM and PS at night to recruit  Continue bronchodilators as tolerated      ***GI***  On Tube feeds at goal  Protonix for stress ulcer prophylaxis.   Ascites: Last paracentesis     ***Renal***  ESRD - last HD : -2.5L UF (MWF)  LUE AV fistula   Nephro-Elicia for renal support    ***ID***  Sepsis/septic shock due to ESBL E. coli - Continue Meropenem. inhaled mary  Trend leukocytosis   PO Vancomycin for C diff prophylaxis   Mccauley stain negative.  Sacral wound - continue wound care, may benefit from a VAC    ***Endocrine***  Hyperglycemia - Insulin sliding scale    ***Hematology***  Acute blood loss anemia  No current transfusion indication, trend CBC and monitor for bleeding.  A fib and DCCV and on heparin gtt since : held on  for trach bleeding  SCD's  Continue on Epogen.        I, Lillie Kingsley MD, personally performed the services described in this documentation. all medical record entries made by the scribe were at my direction and in my presence. I have reviewed the chart and agree that the record reflects my personal performance and is accurate and complete  Electronically signed:   Lillie Kingsley MD  CT ICU attending     ICU time: ** mins     By signing my name below, I, Angelo Barbosa, attest that this documentation has been prepared under the direction and in the presence of Lillie Kingsley MD  Electronically signed: Angelo Barbosa, 25 @ 07:29             Patient seen and examined at the bedside.    Remains critically ill on continuous ICU monitoring, at risk for life threatening decompensation.  All Labs, data reviewed. Plan of care discussed in length during multi-disciplinary ICU rounds.     Brief Summary:  55 yo M with multiple medical problems including CAD s/p PCI in 2024 s/p CABG x 3 on 24  1: Intubated for hypoxia & left lung white out s/p awake bronch with removal of copious mucopurulent secretions  : s/p IABP insertion, sepsis/septic shock due to E coli   1/15: Emergently reintubated.  : HD and 2.5L UF removal  : d/gladys ,     24 Hour events:  Tolerates Bronch, BAL follow up  Trach collar, walked in a unit  iHD today, 1 U of PRBC    Objective:  Vital Signs Last 24 Hrs  T(C): 36.2 (2025 00:00), Max: 37.3 (2025 08:00)  T(F): 97.2 (2025 00:00), Max: 99.2 (2025 08:00)  HR: 71 (2025 06:00) (59 - 82)  BP: --  BP(mean): --  RR: 22 (2025 06:00) (14 - 35)  SpO2: 95% (2025 06:00) (92% - 100%)    Parameters below as of 2025 04:00  Patient On (Oxygen Delivery Method): ventilator    O2 Concentration (%): 50    Mode: AC/ CMV (Assist Control/ Continuous Mandatory Ventilation)  RR (machine): 22  FiO2: 40  PEEP: 8  ITime: 1  MAP: 11  PC: 15  PIP: 24              Physical Exam:   General: Alert and interactive,   Neurology: Oriented, following commands. ambulates  Respiratory: Breath sounds bilaterally, trach collar  CV: Sinus  Abdominal: Soft, Nontender  Extremities: Warm, well-perfused  Right arm tender, but stable  -------------------------------------------------------------------------------------------------------------------------------    Labs:                        7.8    10.43 )-----------( 297      ( 2025 00:29 )             24.6     01-20    133[L]  |  96  |  52[H]  ----------------------------<  127[H]  4.1   |  22  |  6.64[H]    Ca    8.5      2025 00:29  Phos  5.0       Mg     2.6         TPro  5.6[L]  /  Alb  2.6[L]  /  TBili  0.5  /  DBili  x   /  AST  18  /  ALT  9[L]  /  AlkPhos  91      LIVER FUNCTIONS - ( 2025 00:29 )  Alb: 2.6 g/dL / Pro: 5.6 g/dL / ALK PHOS: 91 U/L / ALT: 9 U/L / AST: 18 U/L / GGT: x             ABG - ( 2025 00:10 )  pH, Arterial: 7.48  pH, Blood: x     /  pCO2: 36    /  pO2: 93    / HCO3: 27    / Base Excess: 3.2   /  SaO2: 98.8      ALL MEDICATIONS   MEDICATIONS  (STANDING):  acetylcysteine 20%  Inhalation 4 milliLiter(s) Inhalation every 6 hours  albuterol    0.083% 2.5 milliGRAM(s) Nebulizer every 6 hours  aMIOdarone    Tablet   Oral   aMIOdarone    Tablet 200 milliGRAM(s) Oral daily  ascorbic acid 500 milliGRAM(s) Oral daily  aspirin  chewable 81 milliGRAM(s) Oral daily  atorvastatin 80 milliGRAM(s) Oral at bedtime  bisacodyl Suppository 10 milliGRAM(s) Rectal once  chlorhexidine 0.12% Liquid 15 milliLiter(s) Oral Mucosa every 12 hours  chlorhexidine 2% Cloths 1 Application(s) Topical daily  chlorhexidine 4% Liquid 1 Application(s) Topical <User Schedule>  dextrose 50% Injectable 50 milliLiter(s) IV Push every 15 minutes  epoetin ignacia (EPOGEN) Injectable 98812 Unit(s) IV Push <User Schedule>  gabapentin 100 milliGRAM(s) Oral every 12 hours  heparin   Injectable 5000 Unit(s) SubCutaneous every 8 hours  hydrALAZINE 10 milliGRAM(s) Oral every 8 hours  insulin lispro (ADMELOG) corrective regimen sliding scale   SubCutaneous every 6 hours  Nephro-elicia 1 Tablet(s) Oral daily  pantoprazole  Injectable 40 milliGRAM(s) IV Push daily  sodium chloride 0.65% Nasal 1 Spray(s) Both Nostrils every 12 hours  sodium chloride 0.9%. 1000 milliLiter(s) (10 mL/Hr) IV Continuous <Continuous>  traZODone 100 milliGRAM(s) Oral at bedtime    MEDICATIONS  (PRN):  benzocaine 20% Spray 1 Spray(s) Topical every 6 hours PRN throat pain  lidocaine/prilocaine Cream 1 Application(s) Topical daily PRN pre-HD  sodium chloride 0.9% lock flush 10 milliLiter(s) IV Push every 1 hour PRN Pre/post blood products, medications, blood draw, and to maintain line patency    ------------------------------------------------------------------------------------------------------------------------------  Assessment:  55 yo M s/p CABG x 3 on 24    Postop acute respiratory failure  Acute blood loss anemia  Ascites    ESRD on iHD   E coli bacteremia 2/2 pneumonia    Plan:  ***Neuro***  Postoperative acute pain control with Gabapentin and prns.  Trazodone nightly  PT ongoing    ***Cardiovascular***  s/p CABG x 3  A fib s/p cardioversion - Amiodarone daily.  Hydralazine for hypertension.  ASA / Statin daily    ***Pulmonary***  Postoperative acute respiratory failure  Tracheostomy    s/p Bronchoscopy .   Tolerates trach collar, keep on a vent at night  Start on 3 Nebs, albuterol    ***GI***  On Tube feeds at goal  Protonix for stress ulcer prophylaxis.   Ascites: Last paracentesis     ***Renal***  ESRD - last HD : -2.5L UF (MWF)  Nephro-Elicia for renal support    ***ID***  Sepsis/septic shock due to ESBL E. coli -  Meropenem. inhaled mary  Course completed  Trend leukocytosis   PO Vancomycin for C diff prophylaxis   Sacral wound - continue wound care, may benefit from a VAC    ***Endocrine***  Hyperglycemia - Insulin sliding scale    ***Hematology***  Acute blood loss anemia  1U u prbc  CBC, coags  Continue on Epogen.  Heparin SQ for DVT    I, Lillie Kingsley MD, personally performed the services described in this documentation. all medical record entries made by the scribe were at my direction and in my presence. I have reviewed the chart and agree that the record reflects my personal performance and is accurate and complete  Electronically signed:   Lillie Kingsley MD  CT ICU attending     ICU time: 48 mins     By signing my name below, I, Angelo Barbosa, attest that this documentation has been prepared under the direction and in the presence of Lillie Kingsley MD  Electronically signed: Angelo Barbosa, 25 @ 07:29

## 2025-01-21 LAB
ALBUMIN SERPL ELPH-MCNC: 2.7 G/DL — LOW (ref 3.3–5)
ALP SERPL-CCNC: 90 U/L — SIGNIFICANT CHANGE UP (ref 40–120)
ALP SERPL-CCNC: 96 U/L — SIGNIFICANT CHANGE UP (ref 40–120)
ALT FLD-CCNC: 9 U/L — LOW (ref 10–45)
ANION GAP SERPL CALC-SCNC: 13 MMOL/L — SIGNIFICANT CHANGE UP (ref 5–17)
ANION GAP SERPL CALC-SCNC: 14 MMOL/L — SIGNIFICANT CHANGE UP (ref 5–17)
AST SERPL-CCNC: 16 U/L — SIGNIFICANT CHANGE UP (ref 10–40)
AST SERPL-CCNC: 22 U/L — SIGNIFICANT CHANGE UP (ref 10–40)
BASOPHILS # BLD AUTO: 0.04 K/UL — SIGNIFICANT CHANGE UP (ref 0–0.2)
BASOPHILS # BLD AUTO: 0.04 K/UL — SIGNIFICANT CHANGE UP (ref 0–0.2)
BASOPHILS NFR BLD AUTO: 0.4 % — SIGNIFICANT CHANGE UP (ref 0–2)
BASOPHILS NFR BLD AUTO: 0.4 % — SIGNIFICANT CHANGE UP (ref 0–2)
BILIRUB SERPL-MCNC: 0.6 MG/DL — SIGNIFICANT CHANGE UP (ref 0.2–1.2)
BILIRUB SERPL-MCNC: 0.6 MG/DL — SIGNIFICANT CHANGE UP (ref 0.2–1.2)
BUN SERPL-MCNC: 32 MG/DL — HIGH (ref 7–23)
BUN SERPL-MCNC: 33 MG/DL — HIGH (ref 7–23)
CALCIUM SERPL-MCNC: 8.2 MG/DL — LOW (ref 8.4–10.5)
CALCIUM SERPL-MCNC: 8.4 MG/DL — SIGNIFICANT CHANGE UP (ref 8.4–10.5)
CHLORIDE SERPL-SCNC: 95 MMOL/L — LOW (ref 96–108)
CO2 SERPL-SCNC: 25 MMOL/L — SIGNIFICANT CHANGE UP (ref 22–31)
CO2 SERPL-SCNC: 26 MMOL/L — SIGNIFICANT CHANGE UP (ref 22–31)
CREAT SERPL-MCNC: 4.8 MG/DL — HIGH (ref 0.5–1.3)
CREAT SERPL-MCNC: 4.86 MG/DL — HIGH (ref 0.5–1.3)
EGFR: 13 ML/MIN/1.73M2 — LOW
EGFR: 14 ML/MIN/1.73M2 — LOW
EGFR: 14 ML/MIN/1.73M2 — LOW
EOSINOPHIL # BLD AUTO: 0.61 K/UL — HIGH (ref 0–0.5)
EOSINOPHIL # BLD AUTO: 0.61 K/UL — HIGH (ref 0–0.5)
EOSINOPHIL NFR BLD AUTO: 6.3 % — HIGH (ref 0–6)
EOSINOPHIL NFR BLD AUTO: 6.4 % — HIGH (ref 0–6)
GAS PNL BLDA: SIGNIFICANT CHANGE UP
GAS PNL BLDA: SIGNIFICANT CHANGE UP
GLUCOSE SERPL-MCNC: 109 MG/DL — HIGH (ref 70–99)
GLUCOSE SERPL-MCNC: 139 MG/DL — HIGH (ref 70–99)
GRAM STN FLD: SIGNIFICANT CHANGE UP
HCT VFR BLD CALC: 30 % — LOW (ref 39–50)
HCT VFR BLD CALC: 30.7 % — LOW (ref 39–50)
HGB BLD-MCNC: 9.5 G/DL — LOW (ref 13–17)
HGB BLD-MCNC: 9.6 G/DL — LOW (ref 13–17)
IMM GRANULOCYTES NFR BLD AUTO: 0.5 % — SIGNIFICANT CHANGE UP (ref 0–0.9)
IMM GRANULOCYTES NFR BLD AUTO: 0.6 % — SIGNIFICANT CHANGE UP (ref 0–0.9)
LYMPHOCYTES # BLD AUTO: 0.44 K/UL — LOW (ref 1–3.3)
LYMPHOCYTES # BLD AUTO: 0.47 K/UL — LOW (ref 1–3.3)
LYMPHOCYTES # BLD AUTO: 4.5 % — LOW (ref 13–44)
LYMPHOCYTES # BLD AUTO: 4.9 % — LOW (ref 13–44)
MAGNESIUM SERPL-MCNC: 2.2 MG/DL — SIGNIFICANT CHANGE UP (ref 1.6–2.6)
MAGNESIUM SERPL-MCNC: 2.2 MG/DL — SIGNIFICANT CHANGE UP (ref 1.6–2.6)
MCHC RBC-ENTMCNC: 28.5 PG — SIGNIFICANT CHANGE UP (ref 27–34)
MCHC RBC-ENTMCNC: 28.7 PG — SIGNIFICANT CHANGE UP (ref 27–34)
MCHC RBC-ENTMCNC: 31.3 G/DL — LOW (ref 32–36)
MCHC RBC-ENTMCNC: 31.7 G/DL — LOW (ref 32–36)
MCV RBC AUTO: 90.1 FL — SIGNIFICANT CHANGE UP (ref 80–100)
MCV RBC AUTO: 91.9 FL — SIGNIFICANT CHANGE UP (ref 80–100)
MONOCYTES # BLD AUTO: 0.7 K/UL — SIGNIFICANT CHANGE UP (ref 0–0.9)
MONOCYTES # BLD AUTO: 0.7 K/UL — SIGNIFICANT CHANGE UP (ref 0–0.9)
MONOCYTES NFR BLD AUTO: 7.2 % — SIGNIFICANT CHANGE UP (ref 2–14)
MONOCYTES NFR BLD AUTO: 7.4 % — SIGNIFICANT CHANGE UP (ref 2–14)
NEUTROPHILS # BLD AUTO: 7.63 K/UL — HIGH (ref 1.8–7.4)
NEUTROPHILS # BLD AUTO: 7.91 K/UL — HIGH (ref 1.8–7.4)
NEUTROPHILS NFR BLD AUTO: 80.4 % — HIGH (ref 43–77)
NEUTROPHILS NFR BLD AUTO: 81 % — HIGH (ref 43–77)
NRBC # BLD: 0 /100 WBCS — SIGNIFICANT CHANGE UP (ref 0–0)
NRBC BLD-RTO: 0 /100 WBCS — SIGNIFICANT CHANGE UP (ref 0–0)
NRBC BLD-RTO: 0 /100 WBCS — SIGNIFICANT CHANGE UP (ref 0–0)
PHOSPHATE SERPL-MCNC: 3.2 MG/DL — SIGNIFICANT CHANGE UP (ref 2.5–4.5)
PHOSPHATE SERPL-MCNC: 3.3 MG/DL — SIGNIFICANT CHANGE UP (ref 2.5–4.5)
PLATELET # BLD AUTO: 300 K/UL — SIGNIFICANT CHANGE UP (ref 150–400)
PLATELET # BLD AUTO: 308 K/UL — SIGNIFICANT CHANGE UP (ref 150–400)
POTASSIUM BLDA-SCNC: 3.6 MMOL/L — SIGNIFICANT CHANGE UP (ref 3.5–5.1)
POTASSIUM BLDA-SCNC: 3.6 MMOL/L — SIGNIFICANT CHANGE UP (ref 3.5–5.1)
POTASSIUM SERPL-MCNC: 3.6 MMOL/L — SIGNIFICANT CHANGE UP (ref 3.5–5.3)
POTASSIUM SERPL-MCNC: 3.9 MMOL/L — SIGNIFICANT CHANGE UP (ref 3.5–5.3)
POTASSIUM SERPL-SCNC: 3.6 MMOL/L — SIGNIFICANT CHANGE UP (ref 3.5–5.3)
POTASSIUM SERPL-SCNC: 3.9 MMOL/L — SIGNIFICANT CHANGE UP (ref 3.5–5.3)
PROT SERPL-MCNC: 5.6 G/DL — LOW (ref 6–8.3)
PROT SERPL-MCNC: 5.9 G/DL — LOW (ref 6–8.3)
RBC # BLD: 3.33 M/UL — LOW (ref 4.2–5.8)
RBC # BLD: 3.34 M/UL — LOW (ref 4.2–5.8)
RBC # FLD: 19.9 % — HIGH (ref 10.3–14.5)
RBC # FLD: 19.9 % — HIGH (ref 10.3–14.5)
SODIUM SERPL-SCNC: 134 MMOL/L — LOW (ref 135–145)
SODIUM SERPL-SCNC: 135 MMOL/L — SIGNIFICANT CHANGE UP (ref 135–145)
SPECIMEN SOURCE: SIGNIFICANT CHANGE UP
WBC # BLD: 9.5 K/UL — SIGNIFICANT CHANGE UP (ref 3.8–10.5)
WBC # BLD: 9.76 K/UL — SIGNIFICANT CHANGE UP (ref 3.8–10.5)
WBC # FLD AUTO: 9.5 K/UL — SIGNIFICANT CHANGE UP (ref 3.8–10.5)
WBC # FLD AUTO: 9.76 K/UL — SIGNIFICANT CHANGE UP (ref 3.8–10.5)

## 2025-01-21 PROCEDURE — 71045 X-RAY EXAM CHEST 1 VIEW: CPT | Mod: 26

## 2025-01-21 PROCEDURE — 31645 BRNCHSC W/THER ASPIR 1ST: CPT

## 2025-01-21 PROCEDURE — 99232 SBSQ HOSP IP/OBS MODERATE 35: CPT

## 2025-01-21 PROCEDURE — G0545: CPT

## 2025-01-21 PROCEDURE — 99291 CRITICAL CARE FIRST HOUR: CPT | Mod: 25

## 2025-01-21 RX ORDER — FENTANYL CITRATE-0.9 % NACL/PF 100MCG/2ML
25 SYRINGE (ML) INTRAVENOUS ONCE
Refills: 0 | Status: DISCONTINUED | OUTPATIENT
Start: 2025-01-21 | End: 2025-01-21

## 2025-01-21 RX ORDER — PROPOFOL 10 MG/ML
200 INJECTION, EMULSION INTRAVENOUS ONCE
Refills: 0 | Status: COMPLETED | OUTPATIENT
Start: 2025-01-21 | End: 2025-01-21

## 2025-01-21 RX ORDER — PROPOFOL 10 MG/ML
50 INJECTION, EMULSION INTRAVENOUS
Qty: 1000 | Refills: 0 | Status: DISCONTINUED | OUTPATIENT
Start: 2025-01-21 | End: 2025-01-21

## 2025-01-21 RX ORDER — PROPOFOL 10 MG/ML
140 INJECTION, EMULSION INTRAVENOUS ONCE
Refills: 0 | Status: COMPLETED | OUTPATIENT
Start: 2025-01-21 | End: 2025-01-21

## 2025-01-21 RX ORDER — HYDROMORPHONE/SOD CHLOR,ISO/PF 2 MG/10 ML
0.5 SYRINGE (ML) INJECTION ONCE
Refills: 0 | Status: DISCONTINUED | OUTPATIENT
Start: 2025-01-21 | End: 2025-01-21

## 2025-01-21 RX ADMIN — GABAPENTIN 100 MILLIGRAM(S): 400 CAPSULE ORAL at 17:54

## 2025-01-21 RX ADMIN — Medication 25 MICROGRAM(S): at 18:45

## 2025-01-21 RX ADMIN — PROPOFOL 140 MILLIGRAM(S): 10 INJECTION, EMULSION INTRAVENOUS at 06:55

## 2025-01-21 RX ADMIN — Medication 15 MILLILITER(S): at 05:10

## 2025-01-21 RX ADMIN — GABAPENTIN 100 MILLIGRAM(S): 400 CAPSULE ORAL at 05:11

## 2025-01-21 RX ADMIN — Medication 1 APPLICATION(S): at 06:16

## 2025-01-21 RX ADMIN — HEPARIN SODIUM 5000 UNIT(S): 1000 INJECTION INTRAVENOUS; SUBCUTANEOUS at 06:19

## 2025-01-21 RX ADMIN — Medication 2.5 MILLIGRAM(S): at 11:12

## 2025-01-21 RX ADMIN — HEPARIN SODIUM 5000 UNIT(S): 1000 INJECTION INTRAVENOUS; SUBCUTANEOUS at 14:49

## 2025-01-21 RX ADMIN — ATORVASTATIN CALCIUM 80 MILLIGRAM(S): 80 TABLET, FILM COATED ORAL at 21:42

## 2025-01-21 RX ADMIN — Medication 2.5 MILLIGRAM(S): at 05:02

## 2025-01-21 RX ADMIN — Medication 10 MILLIGRAM(S): at 14:48

## 2025-01-21 RX ADMIN — HEPARIN SODIUM 5000 UNIT(S): 1000 INJECTION INTRAVENOUS; SUBCUTANEOUS at 21:41

## 2025-01-21 RX ADMIN — Medication 1 TABLET(S): at 12:40

## 2025-01-21 RX ADMIN — PROPOFOL 25.5 MICROGRAM(S)/KG/MIN: 10 INJECTION, EMULSION INTRAVENOUS at 07:52

## 2025-01-21 RX ADMIN — Medication 4 MILLILITER(S): at 11:30

## 2025-01-21 RX ADMIN — Medication 500 MILLIGRAM(S): at 12:35

## 2025-01-21 RX ADMIN — Medication 81 MILLIGRAM(S): at 12:41

## 2025-01-21 RX ADMIN — Medication 100 MILLIGRAM(S): at 21:41

## 2025-01-21 RX ADMIN — Medication 2.5 MILLIGRAM(S): at 17:43

## 2025-01-21 RX ADMIN — Medication 1 APPLICATION(S): at 22:30

## 2025-01-21 RX ADMIN — Medication 40 MILLIGRAM(S): at 12:36

## 2025-01-21 RX ADMIN — Medication 2.5 MILLIGRAM(S): at 23:08

## 2025-01-21 RX ADMIN — Medication 50 MILLIEQUIVALENT(S): at 01:33

## 2025-01-21 RX ADMIN — AMIODARONE HYDROCHLORIDE 200 MILLIGRAM(S): 50 INJECTION, SOLUTION INTRAVENOUS at 05:10

## 2025-01-21 RX ADMIN — Medication 4 MILLILITER(S): at 23:08

## 2025-01-21 RX ADMIN — Medication 4 MILLILITER(S): at 05:02

## 2025-01-21 RX ADMIN — Medication 25 MICROGRAM(S): at 10:30

## 2025-01-21 RX ADMIN — Medication 10 MILLIGRAM(S): at 05:11

## 2025-01-21 RX ADMIN — Medication 25 MICROGRAM(S): at 10:56

## 2025-01-21 RX ADMIN — Medication 25 MICROGRAM(S): at 18:30

## 2025-01-21 RX ADMIN — Medication 15 MILLILITER(S): at 17:53

## 2025-01-21 RX ADMIN — Medication 4 MILLILITER(S): at 17:46

## 2025-01-21 NOTE — PROGRESS NOTE ADULT - SUBJECTIVE AND OBJECTIVE BOX
Patient is a 54y old  Male who presents with a chief complaint of s/p CABG x 3 (21 Jan 2025 07:34)    Being followed by ID for        Interval history:  No other acute events      ROS:  No cough,SOB,CP  No N/V/D  No abd pain  No urinary complaints  No HA  No joint or limb pain  No other complaints    PAST MEDICAL & SURGICAL HISTORY:  Type 2 diabetes mellitus      Hypertension      End stage renal disease  started HD 2/2019 T, Th, Sat via right chest permacath      Bone spur  right shoulder- hx of - sx done      Anemia      Injury of right wrist  hx of at age 14      History of hyperkalemia  before HD- K-6.2 - to repeat in am of sx, pt had HD today      S/P arthroscopy of right shoulder  2010      History of vascular access device  right chest permacath - 2/2019      H/O right wrist surgery  tendons repair - at age 14      AV fistula      H/O ventral hernia repair      S/P cholecystectomy        Allergies    No Known Allergies    Intolerances      Antimicrobials:      MEDICATIONS  (STANDING):  albuterol    0.083% 2.5 milliGRAM(s) Nebulizer every 6 hours  aMIOdarone    Tablet   Oral   aMIOdarone    Tablet 200 milliGRAM(s) Oral daily  ascorbic acid 500 milliGRAM(s) Oral daily  aspirin  chewable 81 milliGRAM(s) Oral daily  atorvastatin 80 milliGRAM(s) Oral at bedtime  bisacodyl Suppository 10 milliGRAM(s) Rectal once  chlorhexidine 0.12% Liquid 15 milliLiter(s) Oral Mucosa every 12 hours  chlorhexidine 2% Cloths 1 Application(s) Topical daily  chlorhexidine 4% Liquid 1 Application(s) Topical <User Schedule>  dextrose 50% Injectable 50 milliLiter(s) IV Push every 15 minutes  epoetin ignacia (EPOGEN) Injectable 73657 Unit(s) IV Push <User Schedule>  gabapentin 100 milliGRAM(s) Oral every 12 hours  heparin   Injectable 5000 Unit(s) SubCutaneous every 8 hours  hydrALAZINE 10 milliGRAM(s) Oral every 8 hours  HYDROmorphone  Injectable 0.5 milliGRAM(s) IV Push once  insulin lispro (ADMELOG) corrective regimen sliding scale   SubCutaneous every 6 hours  Nephro-elicia 1 Tablet(s) Oral daily  pantoprazole  Injectable 40 milliGRAM(s) IV Push daily  sodium chloride 0.65% Nasal 1 Spray(s) Both Nostrils every 12 hours  sodium chloride 0.9%. 1000 milliLiter(s) (10 mL/Hr) IV Continuous <Continuous>  sodium chloride 7% Inhalation 4 milliLiter(s) Inhalation every 12 hours  traZODone 100 milliGRAM(s) Oral at bedtime      Vital Signs Last 24 Hrs  T(C): 36.7 (01-21-25 @ 00:00), Max: 37.1 (01-20-25 @ 12:00)  T(F): 98.1 (01-21-25 @ 00:00), Max: 98.8 (01-20-25 @ 12:00)  HR: 68 (01-21-25 @ 10:00) (61 - 77)  BP: --  BP(mean): --  RR: 14 (01-21-25 @ 10:00) (13 - 35)  SpO2: 100% (01-21-25 @ 10:00) (95% - 100%)    Physical Exam:    Constitutional well preserved,comfortable,pleasant    HEENT PERRLA EOMI,No pallor or icterus    No oral exudate or erythema    Neck supple no JVD or LN    Chest Good AE,CTA    CVS RRR S1 S2 WNl No murmur or rub or gallop    Abd soft BS normal No tenderness no masses    Ext No cyanosis clubbing or edema    IV site no erythema tenderness or discharge    Joints no swelling or LOM    CNS AAO X 3 no focal    Lab Data:                          9.5    9.50  )-----------( 308      ( 21 Jan 2025 01:52 )             30.0       01-21    135  |  96  |  33[H]  ----------------------------<  139[H]  3.6   |  26  |  4.86[H]    Ca    8.2[L]      21 Jan 2025 01:52  Phos  3.2     01-21  Mg     2.2     01-21    TPro  5.6[L]  /  Alb  2.6[L]  /  TBili  0.6  /  DBili  x   /  AST  16  /  ALT  9[L]  /  AlkPhos  90  01-21        Bronchial  01-20-25   Commensal manjit consistent with body site  --    Moderate polymorphonuclear leukocytes per low power field  Rare Squamous epithelial cells per low power field  No organisms seen per oil power field      Bronchial  01-18-25   Commensal manjit consistent with body site  --    Few polymorphonuclear leukocytes seen per low power field  No Squamous epithelial cells seen per low power field  No organisms seen per oil power field            WBC Count: 9.50 (01-21-25 @ 01:52)  WBC Count: 9.76 (01-21-25 @ 01:23)  WBC Count: 10.43 (01-20-25 @ 00:29)  WBC Count: 9.52 (01-19-25 @ 00:21)  WBC Count: 9.61 (01-18-25 @ 00:51)  WBC Count: 11.50 (01-17-25 @ 00:33)  WBC Count: 14.10 (01-16-25 @ 00:41)  WBC Count: 17.07 (01-15-25 @ 00:22)       Bilirubin Total: 0.6 mg/dL (01-21-25 @ 01:52)  Aspartate Aminotransferase (AST/SGOT): 16 U/L (01-21-25 @ 01:52)  Alanine Aminotransferase (ALT/SGPT): 9 U/L (01-21-25 @ 01:52)  Alkaline Phosphatase: 90 U/L (01-21-25 @ 01:52)    Bilirubin Total: 0.6 mg/dL (01-21-25 @ 01:23)  Aspartate Aminotransferase (AST/SGOT): 22 U/L (01-21-25 @ 01:23)  Alanine Aminotransferase (ALT/SGPT): 9 U/L (01-21-25 @ 01:23)  Alkaline Phosphatase: 96 U/L (01-21-25 @ 01:23)    Bilirubin Total: 0.5 mg/dL (01-20-25 @ 00:29)  Aspartate Aminotransferase (AST/SGOT): 18 U/L (01-20-25 @ 00:29)  Alanine Aminotransferase (ALT/SGPT): 9 U/L (01-20-25 @ 00:29)  Alkaline Phosphatase: 91 U/L (01-20-25 @ 00:29)             Patient is a 54y old  Male who presents with a chief complaint of s/p CABG x 3 (21 Jan 2025 07:34)    Being followed by ID for        Interval history:  pt c/o buttocks pain  pt s/p bronchoscopy today-Left main bronchus was completely occluded with thick secretion suctioned out rest of the airway inspected minimal erythema noted .  no endobronchial lesion identified   pt now off all abs  No other acute events        PAST MEDICAL & SURGICAL HISTORY:  Type 2 diabetes mellitus      Hypertension      End stage renal disease  started HD 2/2019 T, Th, Sat via right chest permacath      Bone spur  right shoulder- hx of - sx done      Anemia      Injury of right wrist  hx of at age 14      History of hyperkalemia  before HD- K-6.2 - to repeat in am of sx, pt had HD today      S/P arthroscopy of right shoulder  2010      History of vascular access device  right chest permacath - 2/2019      H/O right wrist surgery  tendons repair - at age 14      AV fistula      H/O ventral hernia repair      S/P cholecystectomy        Allergies    No Known Allergies    Intolerances      Antimicrobials:      MEDICATIONS  (STANDING):  albuterol    0.083% 2.5 milliGRAM(s) Nebulizer every 6 hours  aMIOdarone    Tablet   Oral   aMIOdarone    Tablet 200 milliGRAM(s) Oral daily  ascorbic acid 500 milliGRAM(s) Oral daily  aspirin  chewable 81 milliGRAM(s) Oral daily  atorvastatin 80 milliGRAM(s) Oral at bedtime  bisacodyl Suppository 10 milliGRAM(s) Rectal once  chlorhexidine 0.12% Liquid 15 milliLiter(s) Oral Mucosa every 12 hours  chlorhexidine 2% Cloths 1 Application(s) Topical daily  chlorhexidine 4% Liquid 1 Application(s) Topical <User Schedule>  dextrose 50% Injectable 50 milliLiter(s) IV Push every 15 minutes  epoetin ignacia (EPOGEN) Injectable 78441 Unit(s) IV Push <User Schedule>  gabapentin 100 milliGRAM(s) Oral every 12 hours  heparin   Injectable 5000 Unit(s) SubCutaneous every 8 hours  hydrALAZINE 10 milliGRAM(s) Oral every 8 hours  HYDROmorphone  Injectable 0.5 milliGRAM(s) IV Push once  insulin lispro (ADMELOG) corrective regimen sliding scale   SubCutaneous every 6 hours  Nephro-elicia 1 Tablet(s) Oral daily  pantoprazole  Injectable 40 milliGRAM(s) IV Push daily  sodium chloride 0.65% Nasal 1 Spray(s) Both Nostrils every 12 hours  sodium chloride 0.9%. 1000 milliLiter(s) (10 mL/Hr) IV Continuous <Continuous>  sodium chloride 7% Inhalation 4 milliLiter(s) Inhalation every 12 hours  traZODone 100 milliGRAM(s) Oral at bedtime      Vital Signs Last 24 Hrs  T(C): 36.7 (01-21-25 @ 00:00), Max: 37.1 (01-20-25 @ 12:00)  T(F): 98.1 (01-21-25 @ 00:00), Max: 98.8 (01-20-25 @ 12:00)  HR: 68 (01-21-25 @ 10:00) (61 - 77)  BP: --  BP(mean): --  RR: 14 (01-21-25 @ 10:00) (13 - 35)  SpO2: 100% (01-21-25 @ 10:00) (95% - 100%)    Physical Exam:    Constitutional well preserved,comfortable,pleasant    HEENT PERRLA EOMI,No pallor or icterus    No oral exudate or erythema    Neck supple no JVD or LN    Chest Good AE,CTA    CVS  S1 S2     Abd softly distended  BS normal No tenderness     Ext No cyanosis clubbing or edema    IV site no erythema tenderness or discharge    Joints no swelling or LOM    CNS AAO X 3 no focal    Lab Data:                          9.5    9.50  )-----------( 308      ( 21 Jan 2025 01:52 )             30.0       01-21    135  |  96  |  33[H]  ----------------------------<  139[H]  3.6   |  26  |  4.86[H]    Ca    8.2[L]      21 Jan 2025 01:52  Phos  3.2     01-21  Mg     2.2     01-21    TPro  5.6[L]  /  Alb  2.6[L]  /  TBili  0.6  /  DBili  x   /  AST  16  /  ALT  9[L]  /  AlkPhos  90  01-21        Bronchial  01-20-25   Commensal manjit consistent with body site  --    Moderate polymorphonuclear leukocytes per low power field  Rare Squamous epithelial cells per low power field  No organisms seen per oil power field      Bronchial  01-18-25   Commensal manjit consistent with body site  --    Few polymorphonuclear leukocytes seen per low power field  No Squamous epithelial cells seen per low power field  No organisms seen per oil power field      WBC Count: 9.50 (01-21-25 @ 01:52)  WBC Count: 9.76 (01-21-25 @ 01:23)  WBC Count: 10.43 (01-20-25 @ 00:29)  WBC Count: 9.52 (01-19-25 @ 00:21)  WBC Count: 9.61 (01-18-25 @ 00:51)  WBC Count: 11.50 (01-17-25 @ 00:33)  WBC Count: 14.10 (01-16-25 @ 00:41)  WBC Count: 17.07 (01-15-25 @ 00:22)       Bilirubin Total: 0.6 mg/dL (01-21-25 @ 01:52)  Aspartate Aminotransferase (AST/SGOT): 16 U/L (01-21-25 @ 01:52)  Alanine Aminotransferase (ALT/SGPT): 9 U/L (01-21-25 @ 01:52)  Alkaline Phosphatase: 90 U/L (01-21-25 @ 01:52)    Bilirubin Total: 0.6 mg/dL (01-21-25 @ 01:23)  Aspartate Aminotransferase (AST/SGOT): 22 U/L (01-21-25 @ 01:23)  Alanine Aminotransferase (ALT/SGPT): 9 U/L (01-21-25 @ 01:23)  Alkaline Phosphatase: 96 U/L (01-21-25 @ 01:23)    Bilirubin Total: 0.5 mg/dL (01-20-25 @ 00:29)  Aspartate Aminotransferase (AST/SGOT): 18 U/L (01-20-25 @ 00:29)  Alanine Aminotransferase (ALT/SGPT): 9 U/L (01-20-25 @ 00:29)  Alkaline Phosphatase: 91 U/L (01-20-25 @ 00:29)

## 2025-01-21 NOTE — PROGRESS NOTE ADULT - SUBJECTIVE AND OBJECTIVE BOX
Patient seen and examined at the bedside.    Remains critically ill on continuous ICU monitoring, at risk for life threatening decompensation.  All Labs, data reviewed. Plan of care discussed in length during multi-disciplinary ICU rounds.       Brief Summary:  53 yo M with multiple medical problems including CAD s/p PCI in 2024 s/p CABG x 3 on 24  1: Intubated for hypoxia & left lung white out s/p awake bronch with removal of copious mucopurulent secretions  : s/p IABP insertion, sepsis/septic shock due to E coli   1/15: Emergently reintubated.  : HD and 2.5L UF removal  : d/gladys ,     24 Hour events:  Tolerates Bronch, BAL follow up  Trach collar, walked in a unit  iHD today, 1 U of PRBC    Objective:  Vital Signs Last 24 Hrs  T(C): 36.7 (2025 00:00), Max: 37.1 (2025 12:00)  T(F): 98.1 (2025 00:00), Max: 98.8 (2025 12:00)  HR: 68 (2025 07:00) (58 - 77)  BP: --  BP(mean): --  RR: 22 (2025 07:00) (13 - 35)  SpO2: 100% (2025 07:00) (95% - 100%)    Parameters below as of 2025 05:04  Patient On (Oxygen Delivery Method): ventilator    Mode: AC/ CMV (Assist Control/ Continuous Mandatory Ventilation)  RR (machine): 22  FiO2: 40  PEEP: 8  ITime: 1  MAP: 14  PC: 15  PIP: 24    Physical Exam:  General: Alert and interactive,   Neurology: Oriented, following commands. ambulates  Respiratory: Breath sounds bilaterally, trach collar  CV: Sinus  Abdominal: Soft, Nontender  Extremities: Warm, well-perfused  Right arm tender, but stable  -------------------------------------------------------------------------------------------------------------------------------    Labs:                        9.5    9.50  )-----------( 308      ( 2025 01:52 )             30.0         135  |  96  |  33[H]  ----------------------------<  139[H]  3.6   |  26  |  4.86[H]    Ca    8.2[L]      2025 01:52  Phos  3.2       Mg     2.2         TPro  5.6[L]  /  Alb  2.6[L]  /  TBili  0.6  /  DBili  x   /  AST  16  /  ALT  9[L]  /  AlkPhos  90      LIVER FUNCTIONS - ( 2025 01:52 )  Alb: 2.6 g/dL / Pro: 5.6 g/dL / ALK PHOS: 90 U/L / ALT: 9 U/L / AST: 16 U/L / GGT: x           ABG - ( 2025 23:53 )  pH, Arterial: 7.55  pH, Blood: x     /  pCO2: 34    /  pO2: 71    / HCO3: 30    / Base Excess: 7.0   /  SaO2: 98.0      ALL MEDICATIONS   MEDICATIONS  (STANDING):  albuterol    0.083% 2.5 milliGRAM(s) Nebulizer every 6 hours  aMIOdarone    Tablet   Oral   aMIOdarone    Tablet 200 milliGRAM(s) Oral daily  ascorbic acid 500 milliGRAM(s) Oral daily  aspirin  chewable 81 milliGRAM(s) Oral daily  atorvastatin 80 milliGRAM(s) Oral at bedtime  bisacodyl Suppository 10 milliGRAM(s) Rectal once  chlorhexidine 0.12% Liquid 15 milliLiter(s) Oral Mucosa every 12 hours  chlorhexidine 2% Cloths 1 Application(s) Topical daily  chlorhexidine 4% Liquid 1 Application(s) Topical <User Schedule>  dextrose 50% Injectable 50 milliLiter(s) IV Push every 15 minutes  epoetin ignacia (EPOGEN) Injectable 30362 Unit(s) IV Push <User Schedule>  gabapentin 100 milliGRAM(s) Oral every 12 hours  heparin   Injectable 5000 Unit(s) SubCutaneous every 8 hours  hydrALAZINE 10 milliGRAM(s) Oral every 8 hours  insulin lispro (ADMELOG) corrective regimen sliding scale   SubCutaneous every 6 hours  Nephro-elicia 1 Tablet(s) Oral daily  pantoprazole  Injectable 40 milliGRAM(s) IV Push daily  propofol Injectable 200 milliGRAM(s) IV Push once  sodium chloride 0.65% Nasal 1 Spray(s) Both Nostrils every 12 hours  sodium chloride 0.9%. 1000 milliLiter(s) (10 mL/Hr) IV Continuous <Continuous>  sodium chloride 7% Inhalation 4 milliLiter(s) Inhalation every 12 hours  traZODone 100 milliGRAM(s) Oral at bedtime    MEDICATIONS  (PRN):  benzocaine 20% Spray 1 Spray(s) Topical every 6 hours PRN throat pain  lidocaine/prilocaine Cream 1 Application(s) Topical daily PRN pre-HD  sodium chloride 0.9% lock flush 10 milliLiter(s) IV Push every 1 hour PRN Pre/post blood products, medications, blood draw, and to maintain line patency  ------------------------------------------------------------------------------------------------------------------------------  Assessment:  53 yo M s/p CABG x 3 on 24    Postop acute respiratory failure  Acute blood loss anemia  Ascites    ESRD on iHD   E coli bacteremia 2/2 pneumonia    Plan:  ***Neuro***  Postoperative acute pain control with Gabapentin and prns.  Trazodone nightly  PT ongoing    ***Cardiovascular***  s/p CABG x 3  A fib s/p cardioversion - Amiodarone daily.  Hydralazine for hypertension.  ASA / Statin daily    ***Pulmonary***  Postoperative acute respiratory failure  Tracheostomy    s/p Bronchoscopy .   Tolerates trach collar, keep on a vent at night  Start on 3 Nebs, albuterol    ***GI***  On Tube feeds at goal  Protonix for stress ulcer prophylaxis.   Ascites: Last paracentesis     ***Renal***  ESRD - last HD : -2.5L UF (MWF)  Nephro-Elicia for renal support    ***ID***  Sepsis/septic shock due to ESBL E. coli -  Meropenem. inhaled mary  Course completed  Trend leukocytosis   PO Vancomycin for C diff prophylaxis   Sacral wound - continue wound care, may benefit from a VAC    ***Endocrine***  Hyperglycemia - Insulin sliding scale    ***Hematology***  Acute blood loss anemia  1U u prbc  CBC, coags  Continue on Epogen.  Heparin SQ for DVT        ILillie MD, personally performed the services described in this documentation. All medical record entries made by the scribe were at my direction and in my presence. I have reviewed the chart and agree that the record reflects my personal performance and is accurate and complete  Electronically signed:   Lillie Kingsley MD  CT ICU attending     ICU time: ** mins     By signing my name below, I, Baldemar Hernandez, attest that this documentation has been prepared under the direction and in the presence of Lillie Kingsley MD  Electronically signed: Baldemar Hernandez, 25 @ 07:34             Patient seen and examined at the bedside.    Remains critically ill on continuous ICU monitoring, at risk for life threatening decompensation.  All Labs, data reviewed. Plan of care discussed in length during multi-disciplinary ICU rounds.     Brief Summary:  53 yo M with multiple medical problems including CAD s/p PCI in April 2024 s/p CABG x 3 on 12/30/24  1/2: Intubated for hypoxia & left lung white out s/p awake bronch with removal of copious mucopurulent secretions  1/4: s/p IABP insertion, sepsis/septic shock due to E coli   ESBL pneumonia, collapsed Left Lung, s/p multiple bronch and tracheostomy tube placement 1/18    24 Hour events:  Tolerates Bronch, BAL to follow  Continue PT  iHD yesterday,, 1 U of PRBC    Objective:  Vital Signs Last 24 Hrs  T(C): 36.7 (21 Jan 2025 00:00), Max: 37.1 (20 Jan 2025 12:00)  T(F): 98.1 (21 Jan 2025 00:00), Max: 98.8 (20 Jan 2025 12:00)  HR: 68 (21 Jan 2025 07:00) (58 - 77)  BP: --  BP(mean): --  RR: 22 (21 Jan 2025 07:00) (13 - 35)  SpO2: 100% (21 Jan 2025 07:00) (95% - 100%)    Parameters below as of 21 Jan 2025 05:04  Patient On (Oxygen Delivery Method): ventilator    Mode: AC/ CMV (Assist Control/ Continuous Mandatory Ventilation)  RR (machine): 22  FiO2: 40  PEEP: 8  ITime: 1  MAP: 14  PC: 15  PIP: 24    Physical Exam:  General: Alert and interactive,   Neurology: Oriented, following commands. ambulates  Respiratory: Breath sounds bilaterally, trach collar  CV: Sinus  Abdominal: Soft, Nontender  Extremities: Warm, well-perfused  Right arm tender, but stable  -------------------------------------------------------------------------------------------------------------------------------    Labs:                        9.5    9.50  )-----------( 308      ( 21 Jan 2025 01:52 )             30.0     01-21    135  |  96  |  33[H]  ----------------------------<  139[H]  3.6   |  26  |  4.86[H]    Ca    8.2[L]      21 Jan 2025 01:52  Phos  3.2     01-21  Mg     2.2     01-21    TPro  5.6[L]  /  Alb  2.6[L]  /  TBili  0.6  /  DBili  x   /  AST  16  /  ALT  9[L]  /  AlkPhos  90  01-21    LIVER FUNCTIONS - ( 21 Jan 2025 01:52 )  Alb: 2.6 g/dL / Pro: 5.6 g/dL / ALK PHOS: 90 U/L / ALT: 9 U/L / AST: 16 U/L / GGT: x           ABG - ( 20 Jan 2025 23:53 )  pH, Arterial: 7.55  pH, Blood: x     /  pCO2: 34    /  pO2: 71    / HCO3: 30    / Base Excess: 7.0   /  SaO2: 98.0      ALL MEDICATIONS   MEDICATIONS  (STANDING):  albuterol    0.083% 2.5 milliGRAM(s) Nebulizer every 6 hours  aMIOdarone    Tablet   Oral   aMIOdarone    Tablet 200 milliGRAM(s) Oral daily  ascorbic acid 500 milliGRAM(s) Oral daily  aspirin  chewable 81 milliGRAM(s) Oral daily  atorvastatin 80 milliGRAM(s) Oral at bedtime  bisacodyl Suppository 10 milliGRAM(s) Rectal once  chlorhexidine 0.12% Liquid 15 milliLiter(s) Oral Mucosa every 12 hours  chlorhexidine 2% Cloths 1 Application(s) Topical daily  chlorhexidine 4% Liquid 1 Application(s) Topical <User Schedule>  dextrose 50% Injectable 50 milliLiter(s) IV Push every 15 minutes  epoetin ignacia (EPOGEN) Injectable 58242 Unit(s) IV Push <User Schedule>  gabapentin 100 milliGRAM(s) Oral every 12 hours  heparin   Injectable 5000 Unit(s) SubCutaneous every 8 hours  hydrALAZINE 10 milliGRAM(s) Oral every 8 hours  insulin lispro (ADMELOG) corrective regimen sliding scale   SubCutaneous every 6 hours  Nephro-elicia 1 Tablet(s) Oral daily  pantoprazole  Injectable 40 milliGRAM(s) IV Push daily  propofol Injectable 200 milliGRAM(s) IV Push once  sodium chloride 0.65% Nasal 1 Spray(s) Both Nostrils every 12 hours  sodium chloride 0.9%. 1000 milliLiter(s) (10 mL/Hr) IV Continuous <Continuous>  sodium chloride 7% Inhalation 4 milliLiter(s) Inhalation every 12 hours  traZODone 100 milliGRAM(s) Oral at bedtime    MEDICATIONS  (PRN):  benzocaine 20% Spray 1 Spray(s) Topical every 6 hours PRN throat pain  lidocaine/prilocaine Cream 1 Application(s) Topical daily PRN pre-HD  sodium chloride 0.9% lock flush 10 milliLiter(s) IV Push every 1 hour PRN Pre/post blood products, medications, blood draw, and to maintain line patency  ------------------------------------------------------------------------------------------------------------------------------  Assessment:  53 yo M s/p CABG x 3 on 12/30/24    Postop acute respiratory failure  Acute blood loss anemia  Ascites    ESRD on iHD   E coli bacteremia 2/2 pneumonia    Plan:  ***Neuro***  Postoperative acute pain control with Gabapentin and prns.  Trazodone nightly  PT ongoing    ***Cardiovascular***  s/p CABG x 3  A fib s/p cardioversion - Amiodarone daily.  Hydralazine for hypertension.  ASA / Statin daily    ***Pulmonary***  Postoperative acute respiratory failure  Tracheostomy 1/18   s/p Bronchoscopy 1/18. 1/20  Tolerates trach collar, keep on a vent at night  Start on 3 Nebs, albuterol    ***GI***  On Tube feeds at goal  Protonix for stress ulcer prophylaxis.   Ascites: Last paracentesis 1/11    ***Renal***  ESRD - last HD 1/17: -2.5L UF (MWF)  Nephro-Elicia for renal support    ***ID***  Sepsis/septic shock due to ESBL E. coli -  Meropenem. inhaled mary  Course completed  Trend leukocytosis   PO Vancomycin for C diff prophylaxis   Sacral wound - continue wound care, may benefit from a VAC    ***Endocrine***  Hyperglycemia - Insulin sliding scale    ***Hematology***  Acute blood loss anemia  CBC, coags  Continue  Epogen.  Heparin SQ for DVT    I, Llilie Kingsley MD, personally performed the services described in this documentation. All medical record entries made by the scribe were at my direction and in my presence. I have reviewed the chart and agree that the record reflects my personal performance and is accurate and complete  Electronically signed:   Lillie Kingsley MD  CT ICU attending     ICU time: 44 mins     By signing my name below, I, Baldemar Hernandez, attest that this documentation has been prepared under the direction and in the presence of Lillie Kingsley MD  Electronically signed: Baldemar Hernandez, 01-21-25 @ 07:34

## 2025-01-21 NOTE — PROCEDURE NOTE - NSBRONCHPROCDETAILS_GEN_A_CORE_FT
Bronchoscope inserted via Trach ema identified ,Left main bronchus was completely occluded with thick secretion suctioned out rest of the airway inspected minimal erythema noted .  no endobronchial lesion identified   F/u diaphragmatic ultrasound   CXR ordered

## 2025-01-21 NOTE — PROGRESS NOTE ADULT - SUBJECTIVE AND OBJECTIVE BOX
General Surgery Progress Note    Overnight: RAINER  Subjective: Patient seen and examined on rounds.    Objective:  Vitals:  T(C): 36.7 (01-21-25 @ 00:00), Max: 37.1 (01-20-25 @ 12:00)  HR: 66 (01-21-25 @ 08:00) (58 - 77)  BP: --  RR: 18 (01-21-25 @ 08:00) (13 - 35)  SpO2: 100% (01-21-25 @ 08:00) (95% - 100%)  Wt(kg): --    01-20 @ 07:01  -  01-21 @ 07:00  --------------------------------------------------------  IN:    Nepro with Carb Steady: 900 mL    PRBCs (Packed Red Blood Cells): 1 mL    sodium chloride 0.9%: 115 mL  Total IN: 1016 mL    OUT:    Other (mL): 2000 mL  Total OUT: 2000 mL    Total NET: -984 mL      01-21 @ 07:01  -  01-21 @ 09:04  --------------------------------------------------------  IN:    sodium chloride 0.9%: 5 mL  Total IN: 5 mL    OUT:    Nepro with Carb Steady: 0 mL  Total OUT: 0 mL    Total NET: 5 mL          Physical Exam:  General: NAD, resting comfortably in bed  HEENT: Trach in place secured, c/d/i      Labs:                        9.5    9.50  )-----------( 308      ( 21 Jan 2025 01:52 )             30.0     01-21    135  |  96  |  33[H]  ----------------------------<  139[H]  3.6   |  26  |  4.86[H]    Ca    8.2[L]      21 Jan 2025 01:52  Phos  3.2     01-21  Mg     2.2     01-21    TPro  5.6[L]  /  Alb  2.6[L]  /  TBili  0.6  /  DBili  x   /  AST  16  /  ALT  9[L]  /  AlkPhos  90  01-21    LIVER FUNCTIONS - ( 21 Jan 2025 01:52 )  Alb: 2.6 g/dL / Pro: 5.6 g/dL / ALK PHOS: 90 U/L / ALT: 9 U/L / AST: 16 U/L / GGT: x             Urinalysis Basic - ( 21 Jan 2025 01:52 )    Color: x / Appearance: x / SG: x / pH: x  Gluc: 139 mg/dL / Ketone: x  / Bili: x / Urobili: x   Blood: x / Protein: x / Nitrite: x   Leuk Esterase: x / RBC: x / WBC x   Sq Epi: x / Non Sq Epi: x / Bacteria: x

## 2025-01-21 NOTE — PROGRESS NOTE ADULT - ASSESSMENT
54M with PMH of HTN, HLD, ESRD on HD MWF via LUE AVF, ascites (requiring repeat paracenteses q4-6wks), NICM with moderate LV dysfunction w/ EF 35-40%(2023) and CAD s/p atherectomy + SALLIE to D1(4/11/2024) presents to [facility] transferred from [facility] for CABG eval  Nephro on [facility] for HD    ESRD on HD   Regular schedule MWF   Access LUE AVF  [facility]  Nephrologist Dr. Bailey  Last HD on 12/24  S/p HD on 12/27 12/29, 12/30 for hyperkalemia and 12/31  2 L PUF On 01/02/2024  However hospital course now complicated by Cardiogenic shock now on multiple pressors,   CRRT initiated 01/03, off since 01/08   S/p PUF on 01/09   HD held on 01/10 due to instability,  S/p HD on 01/17, 1/20  HD tomorrow   Keep MAP>65  Renal Diet  Consent in physical chart    Hyper/Hypokalemia  Monitor K, will change dialysate fluid as needed    HTN  currently on pressure support  monitor bp     CKD MBD  Can send a PTH  Ca and Phos daily    Anemia  CRISTIANE with HD  Transfuse if hgb <7    CAD with multivessel disease  s/p CABG 12/30  CTICU following    Hypoxic Resp failure requiring Trach  Per surgery

## 2025-01-21 NOTE — PROGRESS NOTE ADULT - ASSESSMENT
54M with PMH of HTN, HLD, ESRD on HD MWF via LUE AVF, ascites (requiring repeat paracenteses q4-6wks), NICM with moderate LV dysfunction w/ EF 35-40%() and CAD s/p atherectomy + SALLIE to D1(2024) presents to [facility] transferred from [facility]. Patient initially presented to [facility] ED with shortness of breath. Of note he was recently admitted from - for acute cholecystitis s/p cholecystectomy. In [facility] ED, pt was hypoxic to 86% on RA, trop 1.7K, EKG no new changes, CXR fluid overload. Admitted for AHRF 2/2 fluid overload. Pt received HD on , ,  and . DAPT was resumed, TTE showed improvement in LVEF to 40-45%. Stress test was abnormal with 1mm inferior STD, reversible ischemia in the inferior wall and fixed defects in the lateral, anterior and apical walls with post stress EF of 24%. Pt was then transferred to [facility] for cardiac cath which showed pLAD 70% ISR, mLCx 70%, D1 severe dz. Patient now transferred to [facility] CTS Dr. Rivers for CABG eval. Patient denies any current SOB or chest pain on admission. Otherwise denies headaches, abdominal pain, urinary or bowel changes, fevers, chills, N/V/D, sick contacts.  (24 Dec 2024 05:07)   VSS  CP free   HD via avf  for daily HD pre op- on lovenox 30 qd    HD today continue with present meds. Plan for CABG possibleTVR next week   vss; rsr 55-80; hd today w/ 1 unit prbc; pt to be dialzyed also this  w/ another 1 unit prbc   VSS; RSR 60-80; continue asa/bb/ statin; HD in am - transfuse 1 unit prbc with HD; d/c lovenox after am dose sun    - Pt underwent  C3L free LIMA-LAD; SVG-Diag; SVG-leftPL  Pt given cefuroxime perioperatively   pt extubated   . pt with hypotension and ECHO showing Systolic HF/depressed BIV Function, currently supported with /pressors.  Labs this evening showing transaminitis  1/2 -3 pt hypotensive, febrile and reintubated  Pt pancultured. and started on zosyn-> meropenem and gent  pt found to have Gram negative bacteremia    E coli +ESBL bacteremia    : Afebrile, on dobutmaine but not on pressors, WBC normal. No new culture data. Bleeding around tracheostomy site. Role of antibiotics (IV and nebulized) at this point not clear. CXR   antibiotics d/gladys -- ( january 3-- )    Recommendations  - continue to observe off abs  - pulmonary toilet- ? pulmnary input . ? bronchomalacia  pt now with trach-  - local care for sacral lesion  - ? plans to address the ascites. no evidence currently of peritonitis   - continue to closely trend WBC and temperature  - await sputum culture from bronchoscopy      Marla Champion M.D. ,   please reach via teams   If no answer, or after 5PM/ weekends,  then please call  [phone]    Assessment and plan discussed with the CTU team

## 2025-01-21 NOTE — PROGRESS NOTE ADULT - ASSESSMENT
54M s/p CABG x 3 on 12/30/24 > postop c/b acute respiratory failure 2/2 E coli bacteremia 2/2 pneumonia. Now s/p trach creation 1/16.     Plan:  - Trach sutures removed on am rounds  - surgery to sign off, please reconsult PRN  - appreciate care per CTICU     ACS/Trauma Surgery  [phone].

## 2025-01-22 LAB
ALBUMIN SERPL ELPH-MCNC: 2.6 G/DL — LOW (ref 3.3–5)
ALP SERPL-CCNC: 88 U/L — SIGNIFICANT CHANGE UP (ref 40–120)
ALT FLD-CCNC: 9 U/L — LOW (ref 10–45)
ANION GAP SERPL CALC-SCNC: 12 MMOL/L — SIGNIFICANT CHANGE UP (ref 5–17)
AST SERPL-CCNC: 16 U/L — SIGNIFICANT CHANGE UP (ref 10–40)
BASE EXCESS BLDV CALC-SCNC: 3.4 MMOL/L — HIGH (ref -2–3)
BASOPHILS # BLD AUTO: 0.04 K/UL — SIGNIFICANT CHANGE UP (ref 0–0.2)
BASOPHILS NFR BLD AUTO: 0.4 % — SIGNIFICANT CHANGE UP (ref 0–2)
BILIRUB SERPL-MCNC: 0.5 MG/DL — SIGNIFICANT CHANGE UP (ref 0.2–1.2)
BUN SERPL-MCNC: 39 MG/DL — HIGH (ref 7–23)
CALCIUM SERPL-MCNC: 8.4 MG/DL — SIGNIFICANT CHANGE UP (ref 8.4–10.5)
CHLORIDE SERPL-SCNC: 96 MMOL/L — SIGNIFICANT CHANGE UP (ref 96–108)
CO2 BLDV-SCNC: 31 MMOL/L — HIGH (ref 22–26)
CO2 SERPL-SCNC: 26 MMOL/L — SIGNIFICANT CHANGE UP (ref 22–31)
CREAT SERPL-MCNC: 5.89 MG/DL — HIGH (ref 0.5–1.3)
CULTURE RESULTS: SIGNIFICANT CHANGE UP
EGFR: 11 ML/MIN/1.73M2 — LOW
EGFR: 11 ML/MIN/1.73M2 — LOW
EOSINOPHIL # BLD AUTO: 0.87 K/UL — HIGH (ref 0–0.5)
EOSINOPHIL NFR BLD AUTO: 9.4 % — HIGH (ref 0–6)
GAS PNL BLDV: SIGNIFICANT CHANGE UP
GAS PNL BLDV: SIGNIFICANT CHANGE UP
GLUCOSE SERPL-MCNC: 119 MG/DL — HIGH (ref 70–99)
HCO3 BLDV-SCNC: 30 MMOL/L — HIGH (ref 22–29)
HCT VFR BLD CALC: 31.6 % — LOW (ref 39–50)
HGB BLD-MCNC: 9.8 G/DL — LOW (ref 13–17)
HOROWITZ INDEX BLDV+IHG-RTO: 40 — SIGNIFICANT CHANGE UP
IMM GRANULOCYTES NFR BLD AUTO: 0.6 % — SIGNIFICANT CHANGE UP (ref 0–0.9)
LYMPHOCYTES # BLD AUTO: 0.5 K/UL — LOW (ref 1–3.3)
MAGNESIUM SERPL-MCNC: 2.4 MG/DL — SIGNIFICANT CHANGE UP (ref 1.6–2.6)
MCHC RBC-ENTMCNC: 28.6 PG — SIGNIFICANT CHANGE UP (ref 27–34)
MCHC RBC-ENTMCNC: 31 G/DL — LOW (ref 32–36)
MCV RBC AUTO: 92.1 FL — SIGNIFICANT CHANGE UP (ref 80–100)
MONOCYTES # BLD AUTO: 0.76 K/UL — SIGNIFICANT CHANGE UP (ref 0–0.9)
MONOCYTES NFR BLD AUTO: 8.2 % — SIGNIFICANT CHANGE UP (ref 2–14)
NEUTROPHILS # BLD AUTO: 7.01 K/UL — SIGNIFICANT CHANGE UP (ref 1.8–7.4)
NEUTROPHILS NFR BLD AUTO: 76 % — SIGNIFICANT CHANGE UP (ref 43–77)
NRBC # BLD: 0 /100 WBCS — SIGNIFICANT CHANGE UP (ref 0–0)
NRBC BLD-RTO: 0 /100 WBCS — SIGNIFICANT CHANGE UP (ref 0–0)
PCO2 BLDV: 50 MMHG — SIGNIFICANT CHANGE UP (ref 42–55)
PH BLDV: 7.38 — SIGNIFICANT CHANGE UP (ref 7.32–7.43)
PHOSPHATE SERPL-MCNC: 4.6 MG/DL — HIGH (ref 2.5–4.5)
PLATELET # BLD AUTO: 325 K/UL — SIGNIFICANT CHANGE UP (ref 150–400)
PO2 BLDV: 57 MMHG — HIGH (ref 25–45)
POTASSIUM SERPL-MCNC: 3.8 MMOL/L — SIGNIFICANT CHANGE UP (ref 3.5–5.3)
PROCALCITONIN SERPL-MCNC: 1.37 NG/ML — HIGH (ref 0.02–0.1)
PROT SERPL-MCNC: 5.6 G/DL — LOW (ref 6–8.3)
RBC # BLD: 3.43 M/UL — LOW (ref 4.2–5.8)
RBC # FLD: 19.9 % — HIGH (ref 10.3–14.5)
SAO2 % BLDV: 87.6 % — SIGNIFICANT CHANGE UP (ref 67–88)
SODIUM SERPL-SCNC: 134 MMOL/L — LOW (ref 135–145)
SPECIMEN SOURCE: SIGNIFICANT CHANGE UP
WBC # BLD: 9.24 K/UL — SIGNIFICANT CHANGE UP (ref 3.8–10.5)
WBC # FLD AUTO: 9.24 K/UL — SIGNIFICANT CHANGE UP (ref 3.8–10.5)

## 2025-01-22 PROCEDURE — 99291 CRITICAL CARE FIRST HOUR: CPT

## 2025-01-22 PROCEDURE — G0545: CPT

## 2025-01-22 PROCEDURE — 99232 SBSQ HOSP IP/OBS MODERATE 35: CPT

## 2025-01-22 PROCEDURE — 71045 X-RAY EXAM CHEST 1 VIEW: CPT | Mod: 26

## 2025-01-22 PROCEDURE — 99233 SBSQ HOSP IP/OBS HIGH 50: CPT

## 2025-01-22 RX ORDER — VANCOMYCIN HCL IN 5 % DEXTROSE 1.5G/250ML
PLASTIC BAG, INJECTION (ML) INTRAVENOUS
Refills: 0 | Status: DISCONTINUED | OUTPATIENT
Start: 2025-01-22 | End: 2025-01-22

## 2025-01-22 RX ORDER — MEROPENEM 1 G/30ML
INJECTION INTRAVENOUS
Refills: 0 | Status: COMPLETED | OUTPATIENT
Start: 2025-01-22 | End: 2025-01-30

## 2025-01-22 RX ORDER — VANCOMYCIN HCL IN 5 % DEXTROSE 1.5G/250ML
PLASTIC BAG, INJECTION (ML) INTRAVENOUS
Refills: 0 | Status: DISCONTINUED | OUTPATIENT
Start: 2025-01-22 | End: 2025-01-23

## 2025-01-22 RX ORDER — VANCOMYCIN HCL IN 5 % DEXTROSE 1.5G/250ML
1000 PLASTIC BAG, INJECTION (ML) INTRAVENOUS EVERY 24 HOURS
Refills: 0 | Status: DISCONTINUED | OUTPATIENT
Start: 2025-01-23 | End: 2025-01-23

## 2025-01-22 RX ORDER — ACETAMINOPHEN 500 MG/5ML
1000 LIQUID (ML) ORAL ONCE
Refills: 0 | Status: COMPLETED | OUTPATIENT
Start: 2025-01-22 | End: 2025-01-22

## 2025-01-22 RX ORDER — FENTANYL CITRATE-0.9 % NACL/PF 100MCG/2ML
200 SYRINGE (ML) INTRAVENOUS ONCE
Refills: 0 | Status: DISCONTINUED | OUTPATIENT
Start: 2025-01-22 | End: 2025-01-22

## 2025-01-22 RX ORDER — MEROPENEM 1 G/30ML
500 INJECTION INTRAVENOUS EVERY 24 HOURS
Refills: 0 | Status: COMPLETED | OUTPATIENT
Start: 2025-01-23 | End: 2025-01-29

## 2025-01-22 RX ORDER — MEROPENEM 1 G/30ML
500 INJECTION INTRAVENOUS ONCE
Refills: 0 | Status: COMPLETED | OUTPATIENT
Start: 2025-01-22 | End: 2025-01-22

## 2025-01-22 RX ORDER — FENTANYL CITRATE-0.9 % NACL/PF 100MCG/2ML
150 SYRINGE (ML) INTRAVENOUS ONCE
Refills: 0 | Status: DISCONTINUED | OUTPATIENT
Start: 2025-01-22 | End: 2025-01-22

## 2025-01-22 RX ORDER — FENTANYL CITRATE-0.9 % NACL/PF 100MCG/2ML
25 SYRINGE (ML) INTRAVENOUS ONCE
Refills: 0 | Status: DISCONTINUED | OUTPATIENT
Start: 2025-01-22 | End: 2025-01-22

## 2025-01-22 RX ORDER — MIRTAZAPINE 30 MG/1
7.5 TABLET, FILM COATED ORAL DAILY
Refills: 0 | Status: DISCONTINUED | OUTPATIENT
Start: 2025-01-22 | End: 2025-02-14

## 2025-01-22 RX ORDER — VANCOMYCIN HCL IN 5 % DEXTROSE 1.5G/250ML
1000 PLASTIC BAG, INJECTION (ML) INTRAVENOUS ONCE
Refills: 0 | Status: COMPLETED | OUTPATIENT
Start: 2025-01-22 | End: 2025-01-22

## 2025-01-22 RX ORDER — HYDROMORPHONE/SOD CHLOR,ISO/PF 2 MG/10 ML
0.2 SYRINGE (ML) INJECTION ONCE
Refills: 0 | Status: DISCONTINUED | OUTPATIENT
Start: 2025-01-22 | End: 2025-01-22

## 2025-01-22 RX ORDER — ACETAMINOPHEN 500 MG/5ML
650 LIQUID (ML) ORAL EVERY 6 HOURS
Refills: 0 | Status: DISCONTINUED | OUTPATIENT
Start: 2025-01-22 | End: 2025-03-19

## 2025-01-22 RX ORDER — METHOCARBAMOL 500 MG/1
500 TABLET, FILM COATED ORAL EVERY 8 HOURS
Refills: 0 | Status: DISCONTINUED | OUTPATIENT
Start: 2025-01-22 | End: 2025-03-19

## 2025-01-22 RX ORDER — HYDROMORPHONE/SOD CHLOR,ISO/PF 2 MG/10 ML
0.5 SYRINGE (ML) INJECTION ONCE
Refills: 0 | Status: DISCONTINUED | OUTPATIENT
Start: 2025-01-22 | End: 2025-01-22

## 2025-01-22 RX ADMIN — EPOETIN ALFA 10000 UNIT(S): 10000 SOLUTION INTRAVENOUS; SUBCUTANEOUS at 15:00

## 2025-01-22 RX ADMIN — HEPARIN SODIUM 5000 UNIT(S): 1000 INJECTION INTRAVENOUS; SUBCUTANEOUS at 22:04

## 2025-01-22 RX ADMIN — MEROPENEM 500 MILLIGRAM(S): 1 INJECTION INTRAVENOUS at 11:48

## 2025-01-22 RX ADMIN — Medication 500 MILLIGRAM(S): at 11:07

## 2025-01-22 RX ADMIN — GABAPENTIN 100 MILLIGRAM(S): 400 CAPSULE ORAL at 05:12

## 2025-01-22 RX ADMIN — Medication 4 MILLILITER(S): at 17:30

## 2025-01-22 RX ADMIN — Medication 25 MICROGRAM(S): at 09:52

## 2025-01-22 RX ADMIN — Medication 2.5 MILLIGRAM(S): at 23:25

## 2025-01-22 RX ADMIN — Medication 15 MILLILITER(S): at 05:12

## 2025-01-22 RX ADMIN — Medication 1 SPRAY(S): at 19:13

## 2025-01-22 RX ADMIN — Medication 25 MICROGRAM(S): at 04:00

## 2025-01-22 RX ADMIN — HEPARIN SODIUM 5000 UNIT(S): 1000 INJECTION INTRAVENOUS; SUBCUTANEOUS at 05:12

## 2025-01-22 RX ADMIN — Medication 10 MILLILITER(S): at 19:00

## 2025-01-22 RX ADMIN — Medication 1000 MILLIGRAM(S): at 13:37

## 2025-01-22 RX ADMIN — GABAPENTIN 100 MILLIGRAM(S): 400 CAPSULE ORAL at 17:19

## 2025-01-22 RX ADMIN — METHOCARBAMOL 500 MILLIGRAM(S): 500 TABLET, FILM COATED ORAL at 22:05

## 2025-01-22 RX ADMIN — Medication 25 MICROGRAM(S): at 03:45

## 2025-01-22 RX ADMIN — Medication 4 MILLILITER(S): at 05:05

## 2025-01-22 RX ADMIN — Medication 2.5 MILLIGRAM(S): at 17:30

## 2025-01-22 RX ADMIN — Medication 25 MICROGRAM(S): at 09:18

## 2025-01-22 RX ADMIN — Medication 81 MILLIGRAM(S): at 11:07

## 2025-01-22 RX ADMIN — ATORVASTATIN CALCIUM 80 MILLIGRAM(S): 80 TABLET, FILM COATED ORAL at 22:05

## 2025-01-22 RX ADMIN — Medication 1 APPLICATION(S): at 22:05

## 2025-01-22 RX ADMIN — HEPARIN SODIUM 5000 UNIT(S): 1000 INJECTION INTRAVENOUS; SUBCUTANEOUS at 14:50

## 2025-01-22 RX ADMIN — Medication 10 MILLIGRAM(S): at 05:12

## 2025-01-22 RX ADMIN — Medication 0.2 MILLIGRAM(S): at 12:41

## 2025-01-22 RX ADMIN — Medication 40 MILLIGRAM(S): at 11:07

## 2025-01-22 RX ADMIN — Medication 0.2 MILLIGRAM(S): at 12:02

## 2025-01-22 RX ADMIN — METHOCARBAMOL 500 MILLIGRAM(S): 500 TABLET, FILM COATED ORAL at 13:38

## 2025-01-22 RX ADMIN — Medication 1 TABLET(S): at 11:07

## 2025-01-22 RX ADMIN — Medication 400 MILLIGRAM(S): at 22:00

## 2025-01-22 RX ADMIN — Medication 10 MILLIGRAM(S): at 13:38

## 2025-01-22 RX ADMIN — Medication 100 MILLIGRAM(S): at 22:05

## 2025-01-22 RX ADMIN — Medication 15 MILLILITER(S): at 17:19

## 2025-01-22 RX ADMIN — Medication 1000 MILLIGRAM(S): at 22:15

## 2025-01-22 RX ADMIN — Medication 1 APPLICATION(S): at 05:34

## 2025-01-22 RX ADMIN — AMIODARONE HYDROCHLORIDE 200 MILLIGRAM(S): 50 INJECTION, SOLUTION INTRAVENOUS at 05:12

## 2025-01-22 RX ADMIN — Medication 10 MILLIGRAM(S): at 22:05

## 2025-01-22 RX ADMIN — Medication 4 MILLILITER(S): at 23:25

## 2025-01-22 RX ADMIN — Medication 0.5 MILLIGRAM(S): at 22:00

## 2025-01-22 RX ADMIN — Medication 150 MICROGRAM(S): at 17:10

## 2025-01-22 RX ADMIN — Medication 0.5 MILLIGRAM(S): at 22:15

## 2025-01-22 RX ADMIN — Medication 2.5 MILLIGRAM(S): at 05:05

## 2025-01-22 RX ADMIN — MIRTAZAPINE 7.5 MILLIGRAM(S): 30 TABLET, FILM COATED ORAL at 17:18

## 2025-01-22 RX ADMIN — Medication 150 MICROGRAM(S): at 16:45

## 2025-01-22 NOTE — CONSULT NOTE ADULT - SUBJECTIVE AND OBJECTIVE BOX
HPI: 54M with PMH of HTN, HLD, ESRD on HD MWF via LUE AVF, ascites (requiring repeat paracenteses q4-6wks), NICM with moderate LV dysfunction w/ EF 35-40%(2023) and CAD s/p atherectomy + SALLIE to D1(4/11/2024, now s/p CABG x 3 on 12/30/24 w/ post op course complicated by reintubation for hypoxia found to have ESBL pneumonia, collapsed Left Lung,  s/p IABP insertion for septic shock due to E coli, s/p multiple bronch and tracheostomy tube placement. Colorectal surgery consulted due to concern for perirectal induration/fluctuance. Patient reports increasing pain in the sacrum/buttocks fo rhtt epast 2 weeks.           PAST MEDICAL & SURGICAL HISTORY:  Type 2 diabetes mellitus      Hypertension      End stage renal disease  started HD 2/2019 T, Th, Sat via right chest permacath      Bone spur  right shoulder- hx of - sx done      Anemia      Injury of right wrist  hx of at age 14      History of hyperkalemia  before HD- K-6.2 - to repeat in am of sx, pt had HD today      S/P arthroscopy of right shoulder  2010      History of vascular access device  right chest permacath - 2/2019      H/O right wrist surgery  tendons repair - at age 14      AV fistula      H/O ventral hernia repair      S/P cholecystectomy          Physical Exam:  General: NAD, resting comfortably  HEENT: NC/AT, EOMI  Pulmonary: Normal respiratory effort on RA  Cardiovascular: Normal rate, normotensive  Abdominal: Soft, nondistended, nontender  Neuro: A/O x 3, CNs II-XII grossly intact, no focal deficits, moving extremities spontaneously  Extremities/Vascular: Warm, well perfused, palpable distal pulses    Vital Signs Last 24 Hrs  T(C): 36.6 (22 Jan 2025 18:10), Max: 36.9 (22 Jan 2025 15:10)  T(F): 97.8 (22 Jan 2025 18:10), Max: 98.5 (22 Jan 2025 15:10)  HR: 63 (22 Jan 2025 18:10) (61 - 82)  BP: 105/59 (22 Jan 2025 17:00) (105/59 - 151/74)  BP(mean): 75 (22 Jan 2025 17:00) (75 - 105)  RR: 18 (22 Jan 2025 18:10) (10 - 31)  SpO2: 99% (22 Jan 2025 18:10) (91% - 100%)    Parameters below as of 22 Jan 2025 18:10  Patient On (Oxygen Delivery Method): ventilator          RADIOLOGY & ADDITIONAL STUDIES:               HPI: 54M with PMH of HTN, HLD, ESRD on HD MWF via LUE AVF, ascites (requiring repeat paracenteses q4-6wks), NICM with moderate LV dysfunction w/ EF 35-40%(2023) and CAD s/p atherectomy + SALLIE to D1(4/11/2024, now s/p CABG x 3 on 12/30/24 w/ post op course complicated by reintubation for hypoxia found to have ESBL pneumonia, collapsed left lung, s/p IABP insertion for septic shock due to E coli, s/p multiple bronch and tracheostomy tube placement. Colorectal surgery consulted due to concern for perirectal induration/fluctuance. Patient reports increasing pain in the sacrum/buttocks for the past 2 weeks. He denies fevers or chills. No personal history of perirectal abscesses/fistulas or Crohns disease.           PAST MEDICAL & SURGICAL HISTORY:  Type 2 diabetes mellitus      Hypertension      End stage renal disease  started HD 2/2019 T, Th, Sat via right chest permacath      Bone spur  right shoulder- hx of - sx done      Anemia      Injury of right wrist  hx of at age 14      History of hyperkalemia  before HD- K-6.2 - to repeat in am of sx, pt had HD today      S/P arthroscopy of right shoulder  2010      History of vascular access device  right chest permacath - 2/2019      H/O right wrist surgery  tendons repair - at age 14      AV fistula      H/O ventral hernia repair      S/P cholecystectomy          Physical Exam:  General: NAD, resting comfortably  HEENT: NC/AT, EOMI  Pulmonary: Trach   Cardiovascular: Normal rate, normotensive  Rectal: Sacrum w/ stage 1 pressure ulcer w/ induration, skin surrounding the rectum macerated, no palpable fluctuance  Neuro: A/O x 3, CNs II-XII grossly intact, no focal deficits, moving extremities spontaneously  Extremities/Vascular: Warm, well perfused    Vital Signs Last 24 Hrs  T(C): 36.6 (22 Jan 2025 18:10), Max: 36.9 (22 Jan 2025 15:10)  T(F): 97.8 (22 Jan 2025 18:10), Max: 98.5 (22 Jan 2025 15:10)  HR: 63 (22 Jan 2025 18:10) (61 - 82)  BP: 105/59 (22 Jan 2025 17:00) (105/59 - 151/74)  BP(mean): 75 (22 Jan 2025 17:00) (75 - 105)  RR: 18 (22 Jan 2025 18:10) (10 - 31)  SpO2: 99% (22 Jan 2025 18:10) (91% - 100%)    Parameters below as of 22 Jan 2025 18:10  Patient On (Oxygen Delivery Method): ventilator

## 2025-01-22 NOTE — PROGRESS NOTE ADULT - SUBJECTIVE AND OBJECTIVE BOX
Wound Surgery Progress Note:    HPI:  53 yo M with PMH of HTN, HLD, ESRD on HD MWF via LUE AVF, ascites (requiring repeat paracenteses q4-6wks), NICM with moderate LV dysfunction w/ EF 35-40%() and CAD s/p atherectomy + SALLIE to D1(2024) presents to [facility] transferred from [facility] to [facility] CTS Dr. Rivers for CABG eval. He is now s/p CABG x 3 on 24. On : Intubated for hypoxia & left lung white out s/p awake bronch with removal of copious mucopurulent secretions  : s/p IABP insertion, sepsis/septic shock due to E coli, ESBL pneumonia, collapsed Left Lung, s/p multiple bronch and tracheostomy tube placement   -s/p Trach-vented on -SR / CVP 2 / MAP 63 / Hct 25.9% / Lactate 0.9  -Awake on Trach collar off  c/o buttock pain    Request for reevaluation of sacral/bilateral buttocks wound received verbally from primary team. Patient seen after being seen by Plastic Surgery who, it is reported, debrided the patient of eschar today. Mr. Torres was encountered on an air fluidization simulation surface. He is complaining of pain with palpation of the sacral/buttocks wound on assessment. Within 72 hours of identification of this wound, the patient was in septic shock due to E coli and ESBL pneumonia, had MAPs <60, required vasopressors and mechanical ventilation for >72 hours and HD. Skin hypoperfusion occurred within 72 hours of the identification of this wound in the setting of MODS and skin failure criteria was met. Therefore, etiology of the sacral/buttock  wound is Non-Pressure Skin Failure. All appropriate Pressure Relief / Pressure Redistribution Care including but not limited to alternating air with low air loss support surface, frequent turning and repositioning, use of positioning devices, incontinence management and nutrition support. Despite optimizing pressure injury prevention measures, a state of hypoperfusion has and does exist related to severe malnutrition. Skin Failure development is not related to pressure or shear but to poor tissue perfusion where blood is shunted from the skin and skeletal muscle and there is not enough time for reperfusion even with consistent positioning. The cause of skin failure is lack of blood flow needed to remove waste products and restore tissue oxygen.    PAST MEDICAL & SURGICAL HISTORY:  Type 2 diabetes mellitus  Hypertension  End stage renal disease, started HD 2019 T, Th, Sat via right chest permacath  Bone spur, right shoulder- hx of - sx done  Anemia  Injury of right wrist, hx of at age 14  History of hyperkalemia, before HD- K-6.2 - to repeat in am of sx, pt had HD today  S/P arthroscopy of right shoulder, 2010  History of vascular access device, right chest permacath - 2019  H/O right wrist surgery, tendons repair - at age 14  AV fistula  H/O ventral hernia repair  S/P cholecystectomy    REVIEW OF SYSTEMS  CONSTITUTIONAL: + weakness, no fevers or chills  EYES/ENT: No visual changes;  No vertigo or throat pain   MOUTH: No oral lesion, moist  NECK: No pain or stiffness  RESPIRATORY: No cough, wheezing, hemoptysis; No shortness of breath  CARDIOVASCULAR: No chest pain or palpitations  GASTROINTESTINAL: No abdominal or epigastric pain. No nausea, vomiting, or hematemesis; No diarrhea or constipation. No melena or hematochezia.  GENITOURINARY: No dysuria, frequency or hematuria  NEUROLOGICAL: + weakness  SKIN: No itching, rashes  PSYCH: no confusion or altered mental status    MEDICATIONS  (STANDING):  albuterol    0.083% 2.5 milliGRAM(s) Nebulizer every 6 hours  aMIOdarone    Tablet   Oral   aMIOdarone    Tablet 200 milliGRAM(s) Oral daily  ascorbic acid 500 milliGRAM(s) Oral daily  aspirin  chewable 81 milliGRAM(s) Oral daily  atorvastatin 80 milliGRAM(s) Oral at bedtime  bisacodyl Suppository 10 milliGRAM(s) Rectal once  chlorhexidine 0.12% Liquid 15 milliLiter(s) Oral Mucosa every 12 hours  chlorhexidine 2% Cloths 1 Application(s) Topical daily  chlorhexidine 4% Liquid 1 Application(s) Topical <User Schedule>  dextrose 50% Injectable 50 milliLiter(s) IV Push every 15 minutes  epoetin ignacia (EPOGEN) Injectable 98308 Unit(s) IV Push <User Schedule>  gabapentin 100 milliGRAM(s) Oral every 12 hours  heparin   Injectable 5000 Unit(s) SubCutaneous every 8 hours  hydrALAZINE 10 milliGRAM(s) Oral every 8 hours  insulin lispro (ADMELOG) corrective regimen sliding scale   SubCutaneous every 6 hours  meropenem  IVPB      methocarbamol 500 milliGRAM(s) Oral every 8 hours  mirtazapine 7.5 milliGRAM(s) Oral daily  Nephro-elicia 1 Tablet(s) Oral daily  pantoprazole  Injectable 40 milliGRAM(s) IV Push daily  sodium chloride 0.65% Nasal 1 Spray(s) Both Nostrils every 12 hours  sodium chloride 0.9%. 1000 milliLiter(s) (10 mL/Hr) IV Continuous <Continuous>  sodium chloride 3%  Inhalation 4 milliLiter(s) Inhalation every 6 hours  traZODone 100 milliGRAM(s) Oral at bedtime  vancomycin  IVPB        MEDICATIONS  (PRN):  acetaminophen     Tablet .. 650 milliGRAM(s) Oral every 6 hours PRN Mild Pain (1 - 3)  benzocaine 20% Spray 1 Spray(s) Topical every 6 hours PRN throat pain  lidocaine/prilocaine Cream 1 Application(s) Topical daily PRN pre-HD  sodium chloride 0.9% lock flush 10 milliLiter(s) IV Push every 1 hour PRN Pre/post blood products, medications, blood draw, and to maintain line patency    Allergies    No Known Allergies    Intolerances    Vital Signs Last 24 Hrs  T(C): 36.7 (2025 16:00), Max: 36.9 (2025 15:10)  T(F): 98.1 (2025 16:00), Max: 98.5 (2025 15:10)  HR: 66 (2025 17:00) (61 - 82)  BP: 105/59 (2025 17:00) (105/59 - 151/74)  BP(mean): 75 (2025 17:00) (75 - 105)  RR: 28 (2025 17:00) (10 - 31)  SpO2: 100% (2025 17:00) (91% - 100%)    Parameters below as of 2025 16:00  Patient On (Oxygen Delivery Method): ventilator  O2 Flow (L/min): 15  O2 Concentration (%): 8    Physical Exam:  General: alert, WN  Ophthamology: sclera clear  ENMT: moist mucous membranes, trachea midline  Respiratory: equal chest rise with respirations  Gastrointestinal: soft NT/ND  Neurology: nonverbal,  following commands  Psych: calm, cooperative  Musculoskeletal: no contractures  Vascular: BLE edema equal  Skin:  Sacrum/bilateral buttocks with large central erosion with small amount of soft, adherent light tan slough on the surface of an otherwise pink wound bed L 10cm x W 6cm x D 0.2cm, moderate amount of serosanguinous drainage, +TTP  No odor, increased warmth, induration, fluctuance, erythema      LABS:      134[L]  |  96  |  39[H]  ----------------------------<  119[H]  3.8   |  26  |  5.89[H]    Ca    8.4      2025 00:11  Phos  4.6       Mg     2.4         TPro  5.6[L]  /  Alb  2.6[L]  /  TBili  0.5  /  DBili  x   /  AST  16  /  ALT  9[L]  /  AlkPhos  88                            9.8    9.24  )-----------( 325      ( 2025 00:11 )             31.6       Urinalysis Basic - ( 2025 00:11 )    Color: x / Appearance: x / SG: x / pH: x  Gluc: 119 mg/dL / Ketone: x  / Bili: x / Urobili: x   Blood: x / Protein: x / Nitrite: x   Leuk Esterase: x / RBC: x / WBC x   Sq Epi: x / Non Sq Epi: x / Bacteria: x             Wound Surgery Progress Note:    HPI:  53 yo M with PMH of HTN, HLD, ESRD on HD MWF via LUE AVF, ascites (requiring repeat paracenteses q4-6wks), NICM with moderate LV dysfunction w/ EF 35-40%() and CAD s/p atherectomy + SALLIE to D1(2024) presents to [facility] transferred from [facility] to [facility] CTS Dr. Rivers for CABG eval. He is now s/p CABG x 3 on 24. On : Intubated for hypoxia & left lung white out s/p awake bronch with removal of copious mucopurulent secretions  : s/p IABP insertion, sepsis/septic shock due to E coli, ESBL pneumonia, collapsed Left Lung, s/p multiple bronch and tracheostomy tube placement   -s/p Trach-vented on -SR / CVP 2 / MAP 63 / Hct 25.9% / Lactate 0.9  -Awake on Trach collar off  c/o buttock pain    Request for reevaluation of sacral/bilateral buttocks wound received verbally from primary team. Patient seen after being seen by Plastic Surgery who, it is reported, debrided the patient of eschar today. Mr. Torres was encountered on an air fluidization simulation surface. He is complaining of pain with palpation of the sacral/buttocks wound on assessment. Within 72 hours of identification of this wound, the patient was in septic shock due to E coli and ESBL pneumonia, had MAPs <60, required vasopressors and mechanical ventilation for >72 hours and HD. Skin hypoperfusion occurred within 72 hours of the identification of this wound in the setting of MODS and skin failure criteria was met. Therefore, etiology of the sacral/buttock  wound is Non-Pressure Skin Failure. All appropriate Pressure Relief / Pressure Redistribution Care including but not limited to alternating air with low air loss support surface, frequent turning and repositioning, use of positioning devices, incontinence management and nutrition support. Despite optimizing pressure injury prevention measures, a state of hypoperfusion has and does exist related to severe malnutrition. Skin Failure development is not related to pressure or shear but to poor tissue perfusion where blood is shunted from the skin and skeletal muscle and there is not enough time for reperfusion even with consistent positioning. The cause of skin failure is lack of blood flow needed to remove waste products and restore tissue oxygen.    Reference:    National Pressure Injury Advisory Panel. (n.d.). NPIAP Defines Non-Pressure Skin Failure in the Critically Ill. Retrieved from https://npiap.com/news/013859/NPIAP-Defines-Non-Pressure-Skin-Failure-in-the-Critically-Ill.htm     PAST MEDICAL & SURGICAL HISTORY:  Type 2 diabetes mellitus  Hypertension  End stage renal disease, started HD 2019 T, Th, Sat via right chest permacath  Bone spur, right shoulder- hx of - sx done  Anemia  Injury of right wrist, hx of at age 14  History of hyperkalemia, before HD- K-6.2 - to repeat in am of sx, pt had HD today  S/P arthroscopy of right shoulder,   History of vascular access device, right chest permacath - 2019  H/O right wrist surgery, tendons repair - at age 14  AV fistula  H/O ventral hernia repair  S/P cholecystectomy    REVIEW OF SYSTEMS  CONSTITUTIONAL: + weakness, no fevers or chills  EYES/ENT: No visual changes;  No vertigo or throat pain   MOUTH: No oral lesion, moist  NECK: No pain or stiffness  RESPIRATORY: No cough, wheezing, hemoptysis; No shortness of breath  CARDIOVASCULAR: No chest pain or palpitations  GASTROINTESTINAL: No abdominal or epigastric pain. No nausea, vomiting, or hematemesis; No diarrhea or constipation. No melena or hematochezia.  GENITOURINARY: No dysuria, frequency or hematuria  NEUROLOGICAL: + weakness  SKIN: No itching, rashes  PSYCH: no confusion or altered mental status    MEDICATIONS  (STANDING):  albuterol    0.083% 2.5 milliGRAM(s) Nebulizer every 6 hours  aMIOdarone    Tablet   Oral   aMIOdarone    Tablet 200 milliGRAM(s) Oral daily  ascorbic acid 500 milliGRAM(s) Oral daily  aspirin  chewable 81 milliGRAM(s) Oral daily  atorvastatin 80 milliGRAM(s) Oral at bedtime  bisacodyl Suppository 10 milliGRAM(s) Rectal once  chlorhexidine 0.12% Liquid 15 milliLiter(s) Oral Mucosa every 12 hours  chlorhexidine 2% Cloths 1 Application(s) Topical daily  chlorhexidine 4% Liquid 1 Application(s) Topical <User Schedule>  dextrose 50% Injectable 50 milliLiter(s) IV Push every 15 minutes  epoetin ignacia (EPOGEN) Injectable 80258 Unit(s) IV Push <User Schedule>  gabapentin 100 milliGRAM(s) Oral every 12 hours  heparin   Injectable 5000 Unit(s) SubCutaneous every 8 hours  hydrALAZINE 10 milliGRAM(s) Oral every 8 hours  insulin lispro (ADMELOG) corrective regimen sliding scale   SubCutaneous every 6 hours  meropenem  IVPB      methocarbamol 500 milliGRAM(s) Oral every 8 hours  mirtazapine 7.5 milliGRAM(s) Oral daily  Nephro-elicia 1 Tablet(s) Oral daily  pantoprazole  Injectable 40 milliGRAM(s) IV Push daily  sodium chloride 0.65% Nasal 1 Spray(s) Both Nostrils every 12 hours  sodium chloride 0.9%. 1000 milliLiter(s) (10 mL/Hr) IV Continuous <Continuous>  sodium chloride 3%  Inhalation 4 milliLiter(s) Inhalation every 6 hours  traZODone 100 milliGRAM(s) Oral at bedtime  vancomycin  IVPB        MEDICATIONS  (PRN):  acetaminophen     Tablet .. 650 milliGRAM(s) Oral every 6 hours PRN Mild Pain (1 - 3)  benzocaine 20% Spray 1 Spray(s) Topical every 6 hours PRN throat pain  lidocaine/prilocaine Cream 1 Application(s) Topical daily PRN pre-HD  sodium chloride 0.9% lock flush 10 milliLiter(s) IV Push every 1 hour PRN Pre/post blood products, medications, blood draw, and to maintain line patency    Allergies    No Known Allergies    Intolerances    Vital Signs Last 24 Hrs  T(C): 36.7 (2025 16:00), Max: 36.9 (2025 15:10)  T(F): 98.1 (2025 16:00), Max: 98.5 (2025 15:10)  HR: 66 (2025 17:00) (61 - 82)  BP: 105/59 (2025 17:00) (105/59 - 151/74)  BP(mean): 75 (2025 17:00) (75 - 105)  RR: 28 (2025 17:00) (10 - 31)  SpO2: 100% (2025 17:00) (91% - 100%)    Parameters below as of 2025 16:00  Patient On (Oxygen Delivery Method): ventilator  O2 Flow (L/min): 15  O2 Concentration (%): 8    Physical Exam:  General: alert, WN  Ophthamology: sclera clear  ENMT: moist mucous membranes, trachea midline  Respiratory: equal chest rise with respirations  Gastrointestinal: soft NT/ND  Neurology: nonverbal,  following commands  Psych: calm, cooperative  Musculoskeletal: no contractures  Vascular: BLE edema equal  Skin:  Sacrum/bilateral buttocks with large central erosion with small amount of soft, adherent light tan slough on the surface of an otherwise pink wound bed L 10cm x W 6cm x D 0.2cm, moderate amount of serosanguinous drainage, +TTP  No odor, increased warmth, induration, fluctuance, erythema      LABS:      134[L]  |  96  |  39[H]  ----------------------------<  119[H]  3.8   |  26  |  5.89[H]    Ca    8.4      2025 00:11  Phos  4.6       Mg     2.4         TPro  5.6[L]  /  Alb  2.6[L]  /  TBili  0.5  /  DBili  x   /  AST  16  /  ALT  9[L]  /  AlkPhos  88                            9.8    9.24  )-----------( 325      ( 2025 00:11 )             31.6       Urinalysis Basic - ( 2025 00:11 )    Color: x / Appearance: x / SG: x / pH: x  Gluc: 119 mg/dL / Ketone: x  / Bili: x / Urobili: x   Blood: x / Protein: x / Nitrite: x   Leuk Esterase: x / RBC: x / WBC x   Sq Epi: x / Non Sq Epi: x / Bacteria: x

## 2025-01-22 NOTE — CONSULT NOTE ADULT - ASSESSMENT
54M with PMH of HTN, HLD, ESRD on HD MWF via LUE AVF, ascites (requiring repeat paracenteses q4-6wks), NICM with moderate LV dysfunction w/ EF 35-40%(2023) and CAD s/p atherectomy + SALLIE to D1(4/11/2024, now s/p CABG x 3 on 12/30/24 w/ post op course complicated by reintubation for hypoxia found to have ESBL pneumonia, collapsed left lung, s/p IABP insertion for septic shock due to E coli, s/p multiple bronch and tracheostomy tube placement found to have a sacral pressure wound. No appreciable perirectal abscess on exam. Plastic surgery and wound care evaluating sacral wound.    Recommendations:  - No acute surgical intervention  - Surgical team to follow up CT A/P to r/o deeper abscess  - Care per CTICU    The above discussed with Dr. Aranda

## 2025-01-22 NOTE — PROGRESS NOTE ADULT - ASSESSMENT
54M with PMH of HTN, HLD, ESRD on HD MWF via LUE AVF, ascites (requiring repeat paracenteses q4-6wks), NICM with moderate LV dysfunction w/ EF 35-40%() and CAD s/p atherectomy + SALLIE to D1(2024) presents to [facility] transferred from [facility]. Patient initially presented to [facility] ED with shortness of breath. Of note he was recently admitted from - for acute cholecystitis s/p cholecystectomy. In [facility] ED, pt was hypoxic to 86% on RA, trop 1.7K, EKG no new changes, CXR fluid overload. Admitted for AHRF 2/2 fluid overload. Pt received HD on , ,  and . DAPT was resumed, TTE showed improvement in LVEF to 40-45%. Stress test was abnormal with 1mm inferior STD, reversible ischemia in the inferior wall and fixed defects in the lateral, anterior and apical walls with post stress EF of 24%.     Pt was then transferred to [facility] for cardiac cath which showed pLAD 70% ISR, mLCx 70%, D1 severe dz.     Patient now transferred to [facility] CTS Dr. Rivers for CABG eval.# CAD s/p NSTEMI  Hx CAD s/p PCI LAD   Presented with ADHF elevated troponins  Positive NST1-Cath- pLAD 70% ISR, mLCx 70%, D1 severe dz.   TTE EF 35% Severe TRWas on Plavix-p2y12- 321 been off Plavix since -POD#1-C3L free LIMA-LAD; SVG-Diag; XKO-rfdrNC-Redityuiv on  Sinus rhythm,Amio for AF prophylaxis  -OOB off  Sinus rhythm   -POD#3-On /pressors-EF 25-30% RV failure with transaminitis-Sinus Wjomhm65/03-POD#4-Was intubated for hypoxia-gradual hypotension on /pressors had IABP inserted this morning  -POD#5-s/p IABP insertion, sepsis/septic shock due to GNR/ECOLI HD cath placed   Bronched yesterday 1/3 - minimal secretions with rust-tinged sputum   ESBL-E Coli bacteremia on meropenam    - POD#7 Atrial Fib ,remains intubated weaning IABP 1:3,off pressors on CRRT  -IABP removed.Remains on vent-Alex 10ppm,off Levo- CVP 10 / MAP 71 / Hct 25.6% / Lactate 1.7-Atrial Fibrillation  -Intubated on Alex 10ppm, gtt, BP better-AF~~90's on Amio-had bronch still febrile Tmax 49583/09-Extubated awake alert on HFNC AF,on Dobutrex-CRRT,Cultures no growth    01/10-OOB on BiPAP Sinus Rhythm on -SR/ MAP 57/ CVP 10/  Hct 26.7 / Lact 1.4  -s/p EDU/DCCV-Sinus rhythm on -SR / MAP 52 / CVP 12/ Hct 25.8 / Lact 0.7-had bronch with BAL Left lung  -Sinus rhythm on -for repeat Bronch-SR / MAP 67 / CVP 11 / Hct 27.4% / Lact 0.8  -s/p Trach on  TTE EF 55%-SR / CVP 2 / MAP 63 / Hct 25.9% / Lactate 0.9  -Awake on Trach collar off  c/o buttock pain had bronch yesterday-BAL-Sinus rhythm        # Acute on Chronic Systolic Heart Failure now improved  EF-35% ,severe TR  Had HD post op  GDMT post op  LVEF improved post bypass but now at 23-30%-BiV dysfunction on  now off pressors  -TTE EF 40%,trace effusion  ECG c/w pericarditis-was on colchicine   01/15-TTE EF 55%    Vascular evaluated  AVGraft /?steal causing RV failure-'' Very low suspicion of steal Sd and AVF causing current clinical state. ''   VA Duplex Hemodialysis Access, Left (25 @ 13:35) >   Arterial flow volume of 1301 mL/m, and the venous flow volume of  1492 mL/m are consistent with a normally functioning dialysis access  fistula.      # Ascites  Hx chronic ascites  Has drainage q4-6 weeks  Last drained -4600mL  ? ascites related to severe TR  Had jumlwugdfory-Kcqidzh-ub growth   CT Abdomen 01/10-Large volume ascites-consider paracentesis  Monitor      # ESRD  Now off CRRT transition to HD

## 2025-01-22 NOTE — PROGRESS NOTE ADULT - ASSESSMENT
Impression:  Sacral/buttocks wound 2/2 skin failure  Rt upper back injury now resolved    Recommendations:   1.) topical therapy: sacral/buttock wound – cleanse with Vashe wound cleanser, pat dry, apply Triad ointment BID and PRN for incontinent episodes  2.) Incontinence Management - incontinence cleanser, pads, pericare BID  3.) Maintain on an alternating air with low air loss surface  4.) Turn and reposition Q 2 hours  5.) Nutrition optimization - please add Jeramy. optimize protein  6.) Offload heels/feet with complete cair air fluidized boots/pillows; ensure that the soles of the feet are not resting on the foot board of the bed.  7.) chair cushion for chair sitting  8.) Glycemic control, SS insulin coverage  9.) Pt will need Group 2 mattress on hospital bed and ROHO cushion for wheel chair upon discharge home    Care as per medicine, patient seen by plastics surgery today, please recall for further needs  Upon discharge f/u as outpatient at [facility] [address] [facility] [phone]  Discussed with clinical RN  Coty Mathis, JAX-C, CWOCN via TEAMS    Nights/ Weekends/ Holidays please call:  General Surgery Consult pager ([phone]) for emergencies

## 2025-01-22 NOTE — PROGRESS NOTE ADULT - ASSESSMENT
54M with PMH of HTN, HLD, ESRD on HD MWF via LUE AVF, ascites (requiring repeat paracenteses q4-6wks), NICM with moderate LV dysfunction w/ EF 35-40%(2023) and CAD s/p atherectomy + SALLIE to D1(4/11/2024) presents to [facility] transferred from [facility] for CABG eval  Nephro on [facility] for HD    ESRD on HD   Regular schedule MWF   Access LUE AVF  [facility]  Nephrologist Dr. Bailey  Last HD on 12/24  S/p HD on 12/27 12/29, 12/30 for hyperkalemia and 12/31  2 L PUF On 01/02/2024  However hospital course now complicated by Cardiogenic shock now on multiple pressors,   CRRT initiated 01/03, off since 01/08   S/p PUF on 01/09   HD held on 01/10 due to instability,  S/p HD on 01/17, 1/20  HD today  Keep MAP>65  Renal Diet  Consent in physical chart    Hyper/Hypokalemia  Monitor K, will change dialysate fluid as needed    HTN  currently on pressure support  monitor bp     CKD MBD  Can send a PTH  Ca and Phos daily    Anemia  CRISTIANE with HD  Transfuse if hgb <7    CAD with multivessel disease  s/p CABG 12/30  CTICU following    Hypoxic Resp failure requiring Trach  Per surgery

## 2025-01-22 NOTE — PROGRESS NOTE ADULT - SUBJECTIVE AND OBJECTIVE BOX
MR#[id number]  PATIENT NAME:RAAD CANO    DATE OF SERVICE: 25 @ 07:22  Patient was seen and examined by Solo Quick MD on    25 @ 07:22 .  Interim events noted.Consultant notes ,Labs,Telemetry reviewed by me       HOSPITAL COURSE: HPI:  54M with PMH of HTN, HLD, ESRD on HD MWF via LUE AVF, ascites (requiring repeat paracenteses q4-6wks), NICM with moderate LV dysfunction w/ EF 35-40%() and CAD s/p atherectomy + SALLIE to D1(2024) presents to [facility] transferred from [facility]. Patient initially presented to [facility] ED with shortness of breath. Of note he was recently admitted from - for acute cholecystitis s/p cholecystectomy. In [facility] ED, pt was hypoxic to 86% on RA, trop 1.7K, EKG no new changes, CXR fluid overload. Admitted for AHRF 2/2 fluid overload. Pt received HD on , ,  and . DAPT was resumed, TTE showed improvement in LVEF to 40-45%. Stress test was abnormal with 1mm inferior STD, reversible ischemia in the inferior wall and fixed defects in the lateral, anterior and apical walls with post stress EF of 24%. Pt was then transferred to [facility] for cardiac cath which showed pLAD 70% ISR, mLCx 70%, D1 severe dz. Patient now transferred to [facility] CTS Dr. Rivers for CABG eval. Patient denies any current SOB or chest pain on admission. Otherwise denies headaches, abdominal pain, urinary or bowel changes, fevers, chills, N/V/D, sick contacts.  (24 Dec 2024 05:07)      INTERIM EVENTS:Patient seen at bedside ,interim events noted.   Cardiac cath  pLAD 70% ISR, mLCx 70%, D1 severe dz.   -POD#1-C3L free LIMA-LAD; SVG-Diag; SVG-leftPL Awake OOB extubated Sinus rhythm on Dobutamine gtt  - Was intubated last night for airway protection s/p bronchoscopy This morning had IABP inserted  remains sedated intubated Sinus Rhythm  - s/p IABP insertion, sepsis/septic shock due to GNR/ECOLI -Paracentesis for suspected bacterial peritonitis-no growth  -Was cardioverted yesterday remains in Sinus rhythm-had Bronch earlier L lung mucus-on  On HFNC-40%/30L Trial HD  -OOB remains on HFNC for repeat bronch today-Sinus rhythm On /Vasopressin gtt   -OOB NGT,less dyspneac on  gtt  -s/p Trach-vented on -SR / CVP 2 / MAP 63 / Hct 25.9% / Lactate 0.9  -Awake on Trach collar off  c/o buttock pain had bronch yesterday-BAL-Sinus rhythm      PMH -reviewed admission note, no change since admission    MEDICATIONS  (STANDING):  albuterol    0.083% 2.5 milliGRAM(s) Nebulizer every 6 hours  aMIOdarone    Tablet   Oral   aMIOdarone    Tablet 200 milliGRAM(s) Oral daily  ascorbic acid 500 milliGRAM(s) Oral daily  aspirin  chewable 81 milliGRAM(s) Oral daily  atorvastatin 80 milliGRAM(s) Oral at bedtime  bisacodyl Suppository 10 milliGRAM(s) Rectal once  chlorhexidine 0.12% Liquid 15 milliLiter(s) Oral Mucosa every 12 hours  chlorhexidine 2% Cloths 1 Application(s) Topical daily  chlorhexidine 4% Liquid 1 Application(s) Topical <User Schedule>  dextrose 50% Injectable 50 milliLiter(s) IV Push every 15 minutes  epoetin ignacia (EPOGEN) Injectable 51575 Unit(s) IV Push <User Schedule>  gabapentin 100 milliGRAM(s) Oral every 12 hours  heparin   Injectable 5000 Unit(s) SubCutaneous every 8 hours  hydrALAZINE 10 milliGRAM(s) Oral every 8 hours  insulin lispro (ADMELOG) corrective regimen sliding scale   SubCutaneous every 6 hours  Nephro-elicia 1 Tablet(s) Oral daily  pantoprazole  Injectable 40 milliGRAM(s) IV Push daily  sodium chloride 0.65% Nasal 1 Spray(s) Both Nostrils every 12 hours  sodium chloride 0.9%. 1000 milliLiter(s) (10 mL/Hr) IV Continuous <Continuous>  sodium chloride 3%  Inhalation 4 milliLiter(s) Inhalation every 6 hours  traZODone 100 milliGRAM(s) Oral at bedtime    MEDICATIONS  (PRN):  benzocaine 20% Spray 1 Spray(s) Topical every 6 hours PRN throat pain  lidocaine/prilocaine Cream 1 Application(s) Topical daily PRN pre-HD  sodium chloride 0.9% lock flush 10 milliLiter(s) IV Push every 1 hour PRN Pre/post blood products, medications, blood draw, and to maintain line patency            REVIEW OF SYSTEMS:  Constitutional: [ ] fever, [ ]weight loss,  [c ]fatigue [ ]weight gain  Eyes: [ ] visual changes  Respiratory: [ ]shortness of breath;  [ ] cough, [ ]wheezing, [ ]chills, [ ]hemoptysis  Cardiovascular: [ ] chest pain, [ ]palpitations, [ ]dizziness,  [ ]leg swelling[ ]orthopnea[ ]PND  Gastrointestinal: [ ] abdominal pain, [ ]nausea, [ ]vomiting,  [ ]diarrhea [ ]Constipation [ ]Melena  Genitourinary: [ ] dysuria, [ ] hematuria [ ]Vigil  Neurologic: [ ] headaches [ ] tremors[ ]weakness [ ]Paralysis Right[ ] Left[ ]  Skin: [ ] itching, [ ]burning, [ ] rashes  Endocrine: [ ] heat or cold intolerance  Musculoskeletal: [ ] joint pain or swelling; [ ] muscle, back, or extremity pain  Psychiatric: [ ] depression, [ ]anxiety, [ ]mood swings, or [ ]difficulty sleeping  Hematologic: [ ] easy bruising, [ ] bleeding gums    [ ] All remaining systems negative except as per above.   [ ]Unable to obtain.  [x] No change in ROS since admission      Vital Signs Last 24 Hrs  T(C): 36.6 (2025 04:00), Max: 36.9 (2025 12:00)  T(F): 97.9 (2025 04:00), Max: 98.5 (2025 12:00)  HR: 69 (2025 07:00) (63 - 85)  BP: --145/50  RR: 18 (2025 07:00) (14 - 40)  SpO2: 100% (2025 07:00) (98% - 100%)    Parameters below as of 2025 05:26  Patient On (Oxygen Delivery Method): tracheostomy collar      I&O's Summary    2025 07:01  -  2025 07:00  --------------------------------------------------------  IN: 1275 mL / OUT: 0 mL / NET: 1275 mL    CVP-6    PHYSICAL EXAM:  General: No acute distress BMI-28  HEENT: EOMI, PERRL  Neck: Supple, [ ] JVD[x] T collar  Lungs: Equal air entry bilaterally; [ ] rales [ ] wheezing [ ] rhonchi  Heart: Regular rate and rhythm; [x ] murmur   2/6 [ x] systolic [ ] diastolic [ ] radiation[ ] rubs [ ]  gallops  Abdomen: Nontender, bowel sounds present  Extremities: No clubbing, cyanosis, [ ] edema [ ]Pulses  equal and intact  Nervous system:  Alert & Oriented X3, no focal deficits  Psychiatric: Normal affect  Skin: No rashes or lesions    LABS:      134[L]  |  96  |  39[H]  ----------------------------<  119[H]  3.8   |  26  |  5.89[H]    Ca    8.4      2025 00:11  Phos  4.6       Mg     2.4         TPro  5.6[L]  /  Alb  2.6[L]  /  TBili  0.5  /  DBili  x   /  AST  16  /  ALT  9[L]  /  AlkPhos  88      Creatinine Trend: 5.89<--, 4.86<--, 4.80<--, 6.64<--, 5.37<--, 4.00<--                        9.8    9.24  )-----------( 325      ( 2025 00:11 )             31.6       Xray Chest 1 View- PORTABLE-Urgent (Xray Chest 1 View- PORTABLE-Urgent .) (25 @ 07:49) >  IMPRESSION:  Improved aeration of left upper lung. Support devices as described above.    TTE W or WO Ultrasound Enhancing Agent (25 @ 09:06) >  CONCLUSIONS:      1. Technically difficult image quality.   2. No pericardial effusion seen.     TTE W or WO Ultrasound Enhancing Agent (01.15.25 @ 13:06) >  CONCLUSIONS:      1. Left ventricular systolic function is normal with an ejection fraction visually estimated at 55 %.   2. Small pericardial effusion with evidence of hemodynamic compromise (or echocardiographic evidence of cardiac tamponade): respiratory variation across the mitral valve E wave is not greater than 30%.   3. Bilateral pleural effusion noted.        12 Lead ECG (01.10.25 @ 12:30) >   SINUS RHYTHM WITH 1ST DEGREE A-V BLOCK  LEFT AXIS DEVIATION  NON-SPECIFIC INTRA-VENTRICULAR CONDUCTION BLOCK  MINIMAL VOLTAGE CRITERIA FOR LVH, MAY BE NORMAL VARIANT ( [place] product )  ABNORMAL ECG

## 2025-01-22 NOTE — ADVANCED PRACTICE NURSE CONSULT - REASON FOR CONSULT
Vascular Access Team    Evaluation for: Bedside Midline placement  Requested by name: Jimbo Marquez MODE  Date/Time: 1/22 @ 14:12    Indication: Access to remove CVC  Allergy to CHG or Heparin or Lidocaine: no     Platelets(>20): 325  INR(<3): 1.33  eGFR(>40): 9  Blood cultures sent: 1/22  Blood culture negative in 48hrs: pending  Anticoagulants: hep SQ  Arms DVT: no, doppler on 1/16  Mastectomy: no  Fistula: yes, LUE  PPM/Defib: NA  IR or Nephrology or ID clearance needed: IR for eGFR<40     Pending: IR clearance    Plan: Bedside midline order evaluated.

## 2025-01-22 NOTE — CONSULT NOTE ADULT - PROVIDER SPECIALTY LIST ADULT
Multiple xrays taken to the right leg     Mayda Gruber RN  10/12/22 2003 Vascular Surgery Colorectal Surgery

## 2025-01-22 NOTE — CONSULT NOTE ADULT - ASSESSMENT
sacral wound with eschar  expectant management of eschar, will slough off on own  pressure off loading

## 2025-01-22 NOTE — PROGRESS NOTE ADULT - SUBJECTIVE AND OBJECTIVE BOX
Patient is a 54y old  Male who presents with a chief complaint of CAD s/p CABG (22 Jan 2025 07:21)    Being followed by ID for        Interval history:  No other acute events      ROS:  No cough,SOB,CP  No N/V/D  No abd pain  No urinary complaints  No HA  No joint or limb pain  No other complaints    PAST MEDICAL & SURGICAL HISTORY:  Type 2 diabetes mellitus      Hypertension      End stage renal disease  started HD 2/2019 T, Th, Sat via right chest permacath      Bone spur  right shoulder- hx of - sx done      Anemia      Injury of right wrist  hx of at age 14      History of hyperkalemia  before HD- K-6.2 - to repeat in am of sx, pt had HD today      S/P arthroscopy of right shoulder  2010      History of vascular access device  right chest permacath - 2/2019      H/O right wrist surgery  tendons repair - at age 14      AV fistula      H/O ventral hernia repair      S/P cholecystectomy        Allergies    No Known Allergies    Intolerances      Antimicrobials:    meropenem  IVPB      vancomycin  IVPB      vancomycin  IVPB 1000 milliGRAM(s) IV Intermittent once    MEDICATIONS  (STANDING):  albuterol    0.083% 2.5 milliGRAM(s) Nebulizer every 6 hours  aMIOdarone    Tablet   Oral   aMIOdarone    Tablet 200 milliGRAM(s) Oral daily  ascorbic acid 500 milliGRAM(s) Oral daily  aspirin  chewable 81 milliGRAM(s) Oral daily  atorvastatin 80 milliGRAM(s) Oral at bedtime  bisacodyl Suppository 10 milliGRAM(s) Rectal once  chlorhexidine 0.12% Liquid 15 milliLiter(s) Oral Mucosa every 12 hours  chlorhexidine 2% Cloths 1 Application(s) Topical daily  chlorhexidine 4% Liquid 1 Application(s) Topical <User Schedule>  dextrose 50% Injectable 50 milliLiter(s) IV Push every 15 minutes  epoetin ignacia (EPOGEN) Injectable 77890 Unit(s) IV Push <User Schedule>  gabapentin 100 milliGRAM(s) Oral every 12 hours  heparin   Injectable 5000 Unit(s) SubCutaneous every 8 hours  hydrALAZINE 10 milliGRAM(s) Oral every 8 hours  insulin lispro (ADMELOG) corrective regimen sliding scale   SubCutaneous every 6 hours  meropenem  IVPB      methocarbamol 500 milliGRAM(s) Oral every 8 hours  Nephro-elicia 1 Tablet(s) Oral daily  pantoprazole  Injectable 40 milliGRAM(s) IV Push daily  sodium chloride 0.65% Nasal 1 Spray(s) Both Nostrils every 12 hours  sodium chloride 0.9%. 1000 milliLiter(s) (10 mL/Hr) IV Continuous <Continuous>  sodium chloride 3%  Inhalation 4 milliLiter(s) Inhalation every 6 hours  traZODone 100 milliGRAM(s) Oral at bedtime  vancomycin  IVPB      vancomycin  IVPB 1000 milliGRAM(s) IV Intermittent once      Vital Signs Last 24 Hrs  T(C): 36.8 (01-22-25 @ 08:00), Max: 36.8 (01-21-25 @ 16:00)  T(F): 98.2 (01-22-25 @ 08:00), Max: 98.3 (01-21-25 @ 16:00)  HR: 78 (01-22-25 @ 09:00) (66 - 85)  BP: --  BP(mean): --  RR: 18 (01-22-25 @ 09:00) (14 - 40)  SpO2: 100% (01-22-25 @ 09:00) (98% - 100%)    Physical Exam:    Constitutional well preserved,comfortable,pleasant    HEENT PERRLA EOMI,No pallor or icterus    No oral exudate or erythema    Neck supple no JVD or LN    Chest Good AE,CTA    CVS RRR S1 S2 WNl No murmur or rub or gallop    Abd soft BS normal No tenderness no masses    Ext No cyanosis clubbing or edema    IV site no erythema tenderness or discharge    Joints no swelling or LOM    CNS AAO X 3 no focal    Lab Data:                          9.8    9.24  )-----------( 325      ( 22 Jan 2025 00:11 )             31.6       01-22    134[L]  |  96  |  39[H]  ----------------------------<  119[H]  3.8   |  26  |  5.89[H]    Ca    8.4      22 Jan 2025 00:11  Phos  4.6     01-22  Mg     2.4     01-22    TPro  5.6[L]  /  Alb  2.6[L]  /  TBili  0.5  /  DBili  x   /  AST  16  /  ALT  9[L]  /  AlkPhos  88  01-22      Urinalysis Basic - ( 22 Jan 2025 00:11 )    Color: x / Appearance: x / SG: x / pH: x  Gluc: 119 mg/dL / Ketone: x  / Bili: x / Urobili: x   Blood: x / Protein: x / Nitrite: x   Leuk Esterase: x / RBC: x / WBC x   Sq Epi: x / Non Sq Epi: x / Bacteria: x        Bronchial  01-20-25   Commensal manjit consistent with body site  --    Moderate polymorphonuclear leukocytes per low power field  Rare Squamous epithelial cells per low power field  No organisms seen per oil power field      Bronchial  01-18-25   Commensal manjit consistent with body site  --    Few polymorphonuclear leukocytes seen per low power field  No Squamous epithelial cells seen per low power field  No organisms seen per oil power field                    WBC Count: 9.24 (01-22-25 @ 00:11)  WBC Count: 9.50 (01-21-25 @ 01:52)  WBC Count: 9.76 (01-21-25 @ 01:23)  WBC Count: 10.43 (01-20-25 @ 00:29)  WBC Count: 9.52 (01-19-25 @ 00:21)  WBC Count: 9.61 (01-18-25 @ 00:51)  WBC Count: 11.50 (01-17-25 @ 00:33)  WBC Count: 14.10 (01-16-25 @ 00:41)       Bilirubin Total: 0.5 mg/dL (01-22-25 @ 00:11)  Aspartate Aminotransferase (AST/SGOT): 16 U/L (01-22-25 @ 00:11)  Alanine Aminotransferase (ALT/SGPT): 9 U/L (01-22-25 @ 00:11)  Alkaline Phosphatase: 88 U/L (01-22-25 @ 00:11)  Bilirubin Total: 0.6 mg/dL (01-21-25 @ 01:52)  Aspartate Aminotransferase (AST/SGOT): 16 U/L (01-21-25 @ 01:52)  Alanine Aminotransferase (ALT/SGPT): 9 U/L (01-21-25 @ 01:52)  Alkaline Phosphatase: 90 U/L (01-21-25 @ 01:52)  Bilirubin Total: 0.6 mg/dL (01-21-25 @ 01:23)  Aspartate Aminotransferase (AST/SGOT): 22 U/L (01-21-25 @ 01:23)  Alanine Aminotransferase (ALT/SGPT): 9 U/L (01-21-25 @ 01:23)  Alkaline Phosphatase: 96 U/L (01-21-25 @ 01:23)  Bilirubin Total: 0.5 mg/dL (01-20-25 @ 00:29)  Aspartate Aminotransferase (AST/SGOT): 18 U/L (01-20-25 @ 00:29)  Alanine Aminotransferase (ALT/SGPT): 9 U/L (01-20-25 @ 00:29)  Alkaline Phosphatase: 91 U/L (01-20-25 @ 00:29)  Bilirubin Total: 0.5 mg/dL (01-19-25 @ 00:21)  Aspartate Aminotransferase (AST/SGOT): 20 U/L (01-19-25 @ 00:21)  Alanine Aminotransferase (ALT/SGPT): 10 U/L (01-19-25 @ 00:21)  Alkaline Phosphatase: 81 U/L (01-19-25 @ 00:21)  Bilirubin Total: 0.6 mg/dL (01-18-25 @ 00:51)  Aspartate Aminotransferase (AST/SGOT): 25 U/L (01-18-25 @ 00:51)  Alanine Aminotransferase (ALT/SGPT): 14 U/L (01-18-25 @ 00:51)  Alkaline Phosphatase: 87 U/L (01-18-25 @ 00:51)         Patient is a 54y old  Male who presents with a chief complaint of CAD s/p CABG (22 Jan 2025 07:21)    Being followed by ID for        Interval history:  pt seen with CTU team to inspect sacral wound  pt c/o sacral pain  breathing stable  No other acute events        PAST MEDICAL & SURGICAL HISTORY:  Type 2 diabetes mellitus      Hypertension      End stage renal disease  started HD 2/2019 T, Th, Sat via right chest permacath      Bone spur  right shoulder- hx of - sx done      Anemia      Injury of right wrist  hx of at age 14      History of hyperkalemia  before HD- K-6.2 - to repeat in am of sx, pt had HD today      S/P arthroscopy of right shoulder  2010      History of vascular access device  right chest permacath - 2/2019      H/O right wrist surgery  tendons repair - at age 14      AV fistula      H/O ventral hernia repair      S/P cholecystectomy        Allergies    No Known Allergies    Intolerances      Antimicrobials:    meropenem  IVPB      vancomycin  IVPB      vancomycin  IVPB 1000 milliGRAM(s) IV Intermittent once    MEDICATIONS  (STANDING):  albuterol    0.083% 2.5 milliGRAM(s) Nebulizer every 6 hours  aMIOdarone    Tablet   Oral   aMIOdarone    Tablet 200 milliGRAM(s) Oral daily  ascorbic acid 500 milliGRAM(s) Oral daily  aspirin  chewable 81 milliGRAM(s) Oral daily  atorvastatin 80 milliGRAM(s) Oral at bedtime  bisacodyl Suppository 10 milliGRAM(s) Rectal once  chlorhexidine 0.12% Liquid 15 milliLiter(s) Oral Mucosa every 12 hours  chlorhexidine 2% Cloths 1 Application(s) Topical daily  chlorhexidine 4% Liquid 1 Application(s) Topical <User Schedule>  dextrose 50% Injectable 50 milliLiter(s) IV Push every 15 minutes  epoetin ignacia (EPOGEN) Injectable 00373 Unit(s) IV Push <User Schedule>  gabapentin 100 milliGRAM(s) Oral every 12 hours  heparin   Injectable 5000 Unit(s) SubCutaneous every 8 hours  hydrALAZINE 10 milliGRAM(s) Oral every 8 hours  insulin lispro (ADMELOG) corrective regimen sliding scale   SubCutaneous every 6 hours  meropenem  IVPB      methocarbamol 500 milliGRAM(s) Oral every 8 hours  Nephro-elicia 1 Tablet(s) Oral daily  pantoprazole  Injectable 40 milliGRAM(s) IV Push daily  sodium chloride 0.65% Nasal 1 Spray(s) Both Nostrils every 12 hours  sodium chloride 0.9%. 1000 milliLiter(s) (10 mL/Hr) IV Continuous <Continuous>  sodium chloride 3%  Inhalation 4 milliLiter(s) Inhalation every 6 hours  traZODone 100 milliGRAM(s) Oral at bedtime  vancomycin  IVPB      vancomycin  IVPB 1000 milliGRAM(s) IV Intermittent once      Vital Signs Last 24 Hrs  T(C): 36.8 (01-22-25 @ 08:00), Max: 36.8 (01-21-25 @ 16:00)  T(F): 98.2 (01-22-25 @ 08:00), Max: 98.3 (01-21-25 @ 16:00)  HR: 78 (01-22-25 @ 09:00) (66 - 85)  BP: --  BP(mean): --  RR: 18 (01-22-25 @ 09:00) (14 - 40)  SpO2: 100% (01-22-25 @ 09:00) (98% - 100%)    Physical Exam:    Constitutional well preserved,comfortable,pleasant    HEENT PERRLA EOMI,No pallor or icterus    No oral exudate or erythema    Neck supple no JVD or LN    Chest Good AE,CTA    CVS  S1 S2     Abd soft BS normal No tenderness     Ext No cyanosis clubbing or edema    IV site no erythema tenderness or discharge    butterfly sacral wound with eschar  exquisitely tender R> L    CNS AAO X 3 no focal    Lab Data:                          9.8    9.24  )-----------( 325      ( 22 Jan 2025 00:11 )             31.6       01-22    134[L]  |  96  |  39[H]  ----------------------------<  119[H]  3.8   |  26  |  5.89[H]    Ca    8.4      22 Jan 2025 00:11  Phos  4.6     01-22  Mg     2.4     01-22    TPro  5.6[L]  /  Alb  2.6[L]  /  TBili  0.5  /  DBili  x   /  AST  16  /  ALT  9[L]  /  AlkPhos  88  01-22            Bronchial  01-20-25   Commensal manjit consistent with body site  --    Moderate polymorphonuclear leukocytes per low power field  Rare Squamous epithelial cells per low power field  No organisms seen per oil power field      Bronchial  01-18-25   Commensal manjit consistent with body site  --    Few polymorphonuclear leukocytes seen per low power field  No Squamous epithelial cells seen per low power field  No organisms seen per oil power field        WBC Count: 9.24 (01-22-25 @ 00:11)  WBC Count: 9.50 (01-21-25 @ 01:52)  WBC Count: 9.76 (01-21-25 @ 01:23)  WBC Count: 10.43 (01-20-25 @ 00:29)  WBC Count: 9.52 (01-19-25 @ 00:21)  WBC Count: 9.61 (01-18-25 @ 00:51)  WBC Count: 11.50 (01-17-25 @ 00:33)  WBC Count: 14.10 (01-16-25 @ 00:41)       Bilirubin Total: 0.5 mg/dL (01-22-25 @ 00:11)  Aspartate Aminotransferase (AST/SGOT): 16 U/L (01-22-25 @ 00:11)  Alanine Aminotransferase (ALT/SGPT): 9 U/L (01-22-25 @ 00:11)  Alkaline Phosphatase: 88 U/L (01-22-25 @ 00:11)    Bilirubin Total: 0.6 mg/dL (01-21-25 @ 01:52)  Aspartate Aminotransferase (AST/SGOT): 16 U/L (01-21-25 @ 01:52)  Alanine Aminotransferase (ALT/SGPT): 9 U/L (01-21-25 @ 01:52)  Alkaline Phosphatase: 90 U/L (01-21-25 @ 01:52)    Bilirubin Total: 0.6 mg/dL (01-21-25 @ 01:23)  Aspartate Aminotransferase (AST/SGOT): 22 U/L (01-21-25 @ 01:23)  Alanine Aminotransferase (ALT/SGPT): 9 U/L (01-21-25 @ 01:23)  Alkaline Phosphatase: 96 U/L (01-21-25 @ 01:23)    Aspartate Aminotransferase (AST/SGOT): 25 U/L (01-18-25 @ 00:51)  Alanine Aminotransferase (ALT/SGPT): 14 U/L (01-18-25 @ 00:51)  Alkaline Phosphatase: 87 U/L (01-18-25 @ 00:51)

## 2025-01-22 NOTE — CONSULT NOTE ADULT - SUBJECTIVE AND OBJECTIVE BOX
Plastic Surgery Consult Note  ([phone])    HPI:  54M with PMH of HTN, HLD, ESRD on HD MWF via LUE AVF, ascites (requiring repeat paracenteses q4-6wks), NICM with moderate LV dysfunction w/ EF 35-40%(2023) and CAD s/p atherectomy + SALLIE to D1(4/11/2024) presents to [facility] transferred from [facility]. Patient initially presented to [facility] ED with shortness of breath. Of note he was recently admitted from 09/27-12/02 for acute cholecystitis s/p cholecystectomy. In [facility] ED, pt was hypoxic to 86% on RA, trop 1.7K, EKG no new changes, CXR fluid overload. Admitted for AHRF 2/2 fluid overload. Pt received HD on 12/18, 12/19, 12/20 and 12/22. DAPT was resumed, TTE showed improvement in LVEF to 40-45%. Stress test was abnormal with 1mm inferior STD, reversible ischemia in the inferior wall and fixed defects in the lateral, anterior and apical walls with post stress EF of 24%. Pt was then transferred to [facility] Department for cardiac cath which showed pLAD 70% ISR, mLCx 70%, D1 severe dz. Patient now transferred to [facility] CTS.  Pt s/p CABG 12/30/24.  Pt with sacral pressure wound, plastic surgery asked to evaluate.      PAST MEDICAL & SURGICAL HISTORY:  Type 2 diabetes mellitus      Hypertension      End stage renal disease  started HD 2/2019 T, Th, Sat via right chest permacath      Bone spur  right shoulder- hx of - sx done      Anemia      Injury of right wrist  hx of at age 14      History of hyperkalemia  before HD- K-6.2 - to repeat in am of sx, pt had HD today      S/P arthroscopy of right shoulder  2010      History of vascular access device  right chest permacath - 2/2019      H/O right wrist surgery  tendons repair - at age 14      AV fistula      H/O ventral hernia repair      S/P cholecystectomy          Allergies    No Known Allergies    Intolerances        Home Medications:  aspirin 81 mg oral delayed release tablet: 1 tab(s) orally once a day (23 Dec 2024 16:58)  calcium acetate 667 mg oral tablet: 1 tab(s) orally 3 times a day (23 Dec 2024 16:58)  carvedilol 12.5 mg oral tablet: 1 tab(s) orally every 12 hours (23 Dec 2024 16:56)  clopidogrel 75 mg oral tablet: 1 tab(s) orally once a day (23 Dec 2024 16:56)  furosemide 20 mg oral tablet: 1 tab(s) orally once a day (23 Dec 2024 16:58)  hydrALAZINE 25 mg oral tablet: 1 tab(s) orally 3 times a day (23 Dec 2024 16:58)  lisinopril 40 mg oral tablet: 1 tab(s) orally once a day (23 Dec 2024 16:58)  lovastatin 10 mg oral tablet: 1 tab(s) orally once a day (23 Dec 2024 16:56)  NIFEdipine 90 mg oral tablet, extended release: 1 tab(s) orally once a day (23 Dec 2024 16:58)  Emani-Elicia oral tablet: 1 tab(s) orally once a day (23 Dec 2024 16:58)  traZODone 100 mg oral tablet: 1 tab(s) orally once a day (at bedtime) (23 Dec 2024 16:56)      MEDICATIONS  (STANDING):  albuterol    0.083% 2.5 milliGRAM(s) Nebulizer every 6 hours  aMIOdarone    Tablet   Oral   aMIOdarone    Tablet 200 milliGRAM(s) Oral daily  ascorbic acid 500 milliGRAM(s) Oral daily  aspirin  chewable 81 milliGRAM(s) Oral daily  atorvastatin 80 milliGRAM(s) Oral at bedtime  bisacodyl Suppository 10 milliGRAM(s) Rectal once  chlorhexidine 0.12% Liquid 15 milliLiter(s) Oral Mucosa every 12 hours  chlorhexidine 2% Cloths 1 Application(s) Topical daily  chlorhexidine 4% Liquid 1 Application(s) Topical <User Schedule>  dextrose 50% Injectable 50 milliLiter(s) IV Push every 15 minutes  epoetin ignacia (EPOGEN) Injectable 12678 Unit(s) IV Push <User Schedule>  gabapentin 100 milliGRAM(s) Oral every 12 hours  heparin   Injectable 5000 Unit(s) SubCutaneous every 8 hours  hydrALAZINE 10 milliGRAM(s) Oral every 8 hours  insulin lispro (ADMELOG) corrective regimen sliding scale   SubCutaneous every 6 hours  meropenem  IVPB      methocarbamol 500 milliGRAM(s) Oral every 8 hours  Nephro-elicia 1 Tablet(s) Oral daily  pantoprazole  Injectable 40 milliGRAM(s) IV Push daily  sodium chloride 0.65% Nasal 1 Spray(s) Both Nostrils every 12 hours  sodium chloride 0.9%. 1000 milliLiter(s) (10 mL/Hr) IV Continuous <Continuous>  sodium chloride 3%  Inhalation 4 milliLiter(s) Inhalation every 6 hours  traZODone 100 milliGRAM(s) Oral at bedtime  vancomycin  IVPB          SOCIAL HISTORY:    FAMILY HISTORY:  FH: hypertension  mother, father- alive    FH: type 2 diabetes  mother, father- alive        ___________________________________________  REVIEW OF SYSTEMS:    Constitutional: No fevers, chills, no recent weight loss  ENMT: No changes in hearing, no changes in vision, no sore throat, no cough  Respiratory: No shortness of breath  Cardiovascular: No chest pain, palpitations  Gastrointestinal: No abdominal pain, no diarrhea/constipation  Genitourinary: No dysuria, frequency, or urgency    Extremities: No joint swelling, no limited range of movement  Neurological: No paresthesia  Skin: No rashes    ___________________________________________  OBJECTIVE:  Vital Signs Last 24 Hrs  T(C): 36.8 (22 Jan 2025 12:00), Max: 36.8 (21 Jan 2025 16:00)  T(F): 98.3 (22 Jan 2025 12:00), Max: 98.3 (21 Jan 2025 16:00)  HR: 66 (22 Jan 2025 12:00) (66 - 82)  BP: --  BP(mean): --  RR: 10 (22 Jan 2025 12:00) (10 - 31)  SpO2: 91% (22 Jan 2025 12:00) (91% - 100%)    Parameters below as of 22 Jan 2025 12:00  Patient On (Oxygen Delivery Method): ventilator  O2 Flow (L/min): 15  O2 Concentration (%): 8CAPILLARY BLOOD GLUCOSE  143 (22 Jan 2025 06:00)      POCT Blood Glucose.: 117 mg/dL (22 Jan 2025 13:42)    I&O's Detail    21 Jan 2025 07:01  -  22 Jan 2025 07:00  --------------------------------------------------------  IN:    Enteral Tube Flush: 105 mL    Nepro with Carb Steady: 1050 mL    sodium chloride 0.9%: 120 mL  Total IN: 1275 mL    OUT:  Total OUT: 0 mL    Total NET: 1275 mL      22 Jan 2025 07:01  -  22 Jan 2025 14:34  --------------------------------------------------------  IN:    IV PiggyBack: 350 mL    Nepro with Carb Steady: 350 mL    sodium chloride 0.9%: 35 mL  Total IN: 735 mL    OUT:  Total OUT: 0 mL    Total NET: 735 mL          PHYSICAL EXAM:    On trach, responsive  Back:  Butterfly pattern eschar over sacral area 3 x 3 cm, mildly tender, minimal drainage  MSK:   ____________________________________________  LABS:  CBC Full  -  ( 22 Jan 2025 00:11 )  WBC Count : 9.24 K/uL  RBC Count : 3.43 M/uL  Hemoglobin : 9.8 g/dL  Hematocrit : 31.6 %  Platelet Count - Automated : 325 K/uL  Mean Cell Volume : 92.1 fl  Mean Cell Hemoglobin : 28.6 pg  Mean Cell Hemoglobin Concentration : 31.0 g/dL  Auto Neutrophil # : 7.01 K/uL  Auto Lymphocyte # : 0.50 K/uL  Auto Monocyte # : 0.76 K/uL  Auto Eosinophil # : 0.87 K/uL  Auto Basophil # : 0.04 K/uL  Auto Neutrophil % : 76.0 %  Auto Lymphocyte % : 5.4 %  Auto Monocyte % : 8.2 %  Auto Eosinophil % : 9.4 %  Auto Basophil % : 0.4 %    01-22    134[L]  |  96  |  39[H]  ----------------------------<  119[H]  3.8   |  26  |  5.89[H]    Ca    8.4      22 Jan 2025 00:11  Phos  4.6     01-22  Mg     2.4     01-22    TPro  5.6[L]  /  Alb  2.6[L]  /  TBili  0.5  /  DBili  x   /  AST  16  /  ALT  9[L]  /  AlkPhos  88  01-22    LIVER FUNCTIONS - ( 22 Jan 2025 00:11 )  Alb: 2.6 g/dL / Pro: 5.6 g/dL / ALK PHOS: 88 U/L / ALT: 9 U/L / AST: 16 U/L / GGT: x             Urinalysis Basic - ( 22 Jan 2025 00:11 )    Color: x / Appearance: x / SG: x / pH: x  Gluc: 119 mg/dL / Ketone: x  / Bili: x / Urobili: x   Blood: x / Protein: x / Nitrite: x   Leuk Esterase: x / RBC: x / WBC x   Sq Epi: x / Non Sq Epi: x / Bacteria: x          ____________________________________________  MICRO:    Culture - Bronchial (collected 20 Jan 2025 13:34)  Source: Bronchial  Gram Stain (21 Jan 2025 00:06):    Moderate polymorphonuclear leukocytes per low power field    Rare Squamous epithelial cells per low power field    No organisms seen per oil power field  Final Report (22 Jan 2025 07:44):    Commensal manjit consistent with body site      ____________________________________________  RADIOLOGY:

## 2025-01-22 NOTE — PROGRESS NOTE ADULT - SUBJECTIVE AND OBJECTIVE BOX
[facility]    Dr. Nica Styles     Office [phone] (9 am to 5 pm)  Service: [phone] (5pm to 9am)  Also Available on TEAMS      RENAL PROGRESS NOTE: DATE OF SERVICE 01-22-25 @ 12:14    Patient is a 54y old  Male who presents with a chief complaint of CAD s/p CABG (22 Jan 2025 07:21)      Patient seen and examined at bedside. No chest pain/sob    VITALS:  T(F): 98.2 (01-22-25 @ 08:00), Max: 98.3 (01-21-25 @ 16:00)  HR: 78 (01-22-25 @ 09:00)  BP: --  RR: 18 (01-22-25 @ 09:00)  SpO2: 100% (01-22-25 @ 09:00)  Wt(kg): --    01-21 @ 07:01 - 01-22 @ 07:00  --------------------------------------------------------  IN: 1275 mL / OUT: 0 mL / NET: 1275 mL    01-22 @ 07:01 - 01-22 @ 12:14  --------------------------------------------------------  IN: 55 mL / OUT: 0 mL / NET: 55 mL          PHYSICAL EXAM:  Constitutional: NAD  Neck: No JVD  Respiratory: CTAB, no wheezes, rales or rhonchi  Cardiovascular: S1, S2, RRR  Gastrointestinal: BS+, soft, NT/ND  Extremities: No peripheral edema    Hospital Medications:   MEDICATIONS  (STANDING):  albuterol    0.083% 2.5 milliGRAM(s) Nebulizer every 6 hours  aMIOdarone    Tablet   Oral   aMIOdarone    Tablet 200 milliGRAM(s) Oral daily  ascorbic acid 500 milliGRAM(s) Oral daily  aspirin  chewable 81 milliGRAM(s) Oral daily  atorvastatin 80 milliGRAM(s) Oral at bedtime  bisacodyl Suppository 10 milliGRAM(s) Rectal once  chlorhexidine 0.12% Liquid 15 milliLiter(s) Oral Mucosa every 12 hours  chlorhexidine 2% Cloths 1 Application(s) Topical daily  chlorhexidine 4% Liquid 1 Application(s) Topical <User Schedule>  dextrose 50% Injectable 50 milliLiter(s) IV Push every 15 minutes  epoetin ignacia (EPOGEN) Injectable 69456 Unit(s) IV Push <User Schedule>  gabapentin 100 milliGRAM(s) Oral every 12 hours  heparin   Injectable 5000 Unit(s) SubCutaneous every 8 hours  hydrALAZINE 10 milliGRAM(s) Oral every 8 hours  insulin lispro (ADMELOG) corrective regimen sliding scale   SubCutaneous every 6 hours  meropenem  IVPB      methocarbamol 500 milliGRAM(s) Oral every 8 hours  Nephro-elicia 1 Tablet(s) Oral daily  pantoprazole  Injectable 40 milliGRAM(s) IV Push daily  sodium chloride 0.65% Nasal 1 Spray(s) Both Nostrils every 12 hours  sodium chloride 0.9%. 1000 milliLiter(s) (10 mL/Hr) IV Continuous <Continuous>  sodium chloride 3%  Inhalation 4 milliLiter(s) Inhalation every 6 hours  traZODone 100 milliGRAM(s) Oral at bedtime  vancomycin  IVPB      vancomycin  IVPB 1000 milliGRAM(s) IV Intermittent once      LABS:  01-22    134[L]  |  96  |  39[H]  ----------------------------<  119[H]  3.8   |  26  |  5.89[H]    Ca    8.4      22 Jan 2025 00:11  Phos  4.6     01-22  Mg     2.4     01-22    TPro  5.6[L]  /  Alb  2.6[L]  /  TBili  0.5  /  DBili      /  AST  16  /  ALT  9[L]  /  AlkPhos  88  01-22    Creatinine Trend: 5.89 <--, 4.86 <--, 4.80 <--, 6.64 <--, 5.37 <--, 4.00 <--, 5.09 <--, 3.74 <--    Albumin: 2.6 g/dL (01-22 @ 00:11)  Phosphorus: 4.6 mg/dL (01-22 @ 00:11)                              9.8    9.24  )-----------( 325      ( 22 Jan 2025 00:11 )             31.6     Urine Studies:  Urinalysis - [01-22-25 @ 00:11]      Color  / Appearance  / SG  / pH       Gluc 119 / Ketone   / Bili  / Urobili        Blood  / Protein  / Leuk Est  / Nitrite       RBC  / WBC  / Hyaline  / Gran  / Sq Epi  / Non Sq Epi  / Bacteria       Iron 29, TIBC 143, %sat 20      [12-19-24 @ 05:00]  Ferritin 904      [12-19-24 @ 05:00]  TSH 4.75      [12-24-24 @ 06:50]        RADIOLOGY & ADDITIONAL STUDIES:

## 2025-01-22 NOTE — PROGRESS NOTE ADULT - ASSESSMENT
54M with PMH of HTN, HLD, ESRD on HD MWF via LUE AVF, ascites (requiring repeat paracenteses q4-6wks), NICM with moderate LV dysfunction w/ EF 35-40%() and CAD s/p atherectomy + SALLIE to D1(2024) presents to [facility] transferred from [facility]. Patient initially presented to [facility] ED with shortness of breath. Of note he was recently admitted from - for acute cholecystitis s/p cholecystectomy. In [facility] ED, pt was hypoxic to 86% on RA, trop 1.7K, EKG no new changes, CXR fluid overload. Admitted for AHRF 2/2 fluid overload. Pt received HD on , ,  and . DAPT was resumed, TTE showed improvement in LVEF to 40-45%. Stress test was abnormal with 1mm inferior STD, reversible ischemia in the inferior wall and fixed defects in the lateral, anterior and apical walls with post stress EF of 24%. Pt was then transferred to [facility] for cardiac cath which showed pLAD 70% ISR, mLCx 70%, D1 severe dz. Patient now transferred to [facility] CTS Dr. Rivers for CABG eval. Patient denies any current SOB or chest pain on admission. Otherwise denies headaches, abdominal pain, urinary or bowel changes, fevers, chills, N/V/D, sick contacts.  (24 Dec 2024 05:07)   VSS  CP free   HD via avf  for daily HD pre op- on lovenox 30 qd    HD today continue with present meds. Plan for CABG possibleTVR next week   vss; rsr 55-80; hd today w/ 1 unit prbc; pt to be dialzyed also this  w/ another 1 unit prbc   VSS; RSR 60-80; continue asa/bb/ statin; HD in am - transfuse 1 unit prbc with HD; d/c lovenox after am dose sun    - Pt underwent  C3L free LIMA-LAD; SVG-Diag; SVG-leftPL  Pt given cefuroxime perioperatively   pt extubated   . pt with hypotension and ECHO showing Systolic HF/depressed BIV Function, currently supported with /pressors.  Labs this evening showing transaminitis  1/2 -3 pt hypotensive, febrile and reintubated  Pt pancultured. and started on zosyn-> meropenem and gent  pt found to have Gram negative bacteremia    E coli +ESBL bacteremia    : Afebrile, on dobutmaine but not on pressors, WBC normal. No new culture data. Bleeding around tracheostomy site. Role of antibiotics (IV and nebulized) at this point not clear. CXR   antibiotics d/gladys -- ( january 3-- )   - sacral wound inspected with team - wound care and plastics evaluation    Recommendations  -  team  restarted abs with concern for possible abscess. For plastics input and wound care. consider CT to see depth of wound   - pulmonary toilet- ? pulmonary input . ? bronchomalacia  -pt now with trach-  - local care for sacral lesion  - ascites improved with dialysis and improved cardiac function  - continue to closely trend WBC and temperature  - await sputum culture from bronchoscopy      Marla Champion M.D. ,   please reach via teams   If no answer, or after 5PM/ weekends,  then please call  [phone]    Assessment and plan discussed with the CTU team

## 2025-01-22 NOTE — PROGRESS NOTE ADULT - SUBJECTIVE AND OBJECTIVE BOX
Patient seen and examined at the bedside.    Remains critically ill on continuous ICU monitoring, at risk for life threatening decompensation.  All Labs, data reviewed. Plan of care discussed in length during multi-disciplinary ICU rounds.       Brief Summary:  53 yo M with multiple medical problems including CAD s/p PCI in April 2024 s/p CABG x 3 on 12/30/24  1/2: Intubated for hypoxia & left lung white out s/p awake bronch with removal of copious mucopurulent secretions  1/4: s/p IABP insertion, sepsis/septic shock due to E coli   ESBL pneumonia, collapsed Left Lung, s/p multiple bronch and tracheostomy tube placement 1/18    24 Hour events:  Tolerates Bronch, BAL to follow  Continue PT  iHD two days ago, 1 U of PRBC  PO Vancomycin discontinued     Objective:  Vital Signs Last 24 Hrs  T(C): 36.6 (22 Jan 2025 04:00), Max: 36.9 (21 Jan 2025 12:00)  T(F): 97.9 (22 Jan 2025 04:00), Max: 98.5 (21 Jan 2025 12:00)  HR: 71 (22 Jan 2025 06:00) (63 - 85)  BP: --  BP(mean): --  RR: 18 (22 Jan 2025 06:00) (14 - 40)  SpO2: 100% (22 Jan 2025 06:00) (98% - 100%)    Parameters below as of 22 Jan 2025 05:26  Patient On (Oxygen Delivery Method): tracheostomy collar        Mode: CPAP with PS  FiO2: 40  PEEP: 8  PS: 15  MAP: 14  PIP: 24              Physical Exam:   General: Alert and interactive,   Neurology: Oriented, following commands. ambulates  Respiratory: Breath sounds bilaterally, trach collar  CV: Sinus  Abdominal: Soft, Nontender  Extremities: Warm, well-perfused  Right arm tender, but stable  -------------------------------------------------------------------------------------------------------------------------------    Labs:                        9.8    9.24  )-----------( 325      ( 22 Jan 2025 00:11 )             31.6     01-22    134[L]  |  96  |  39[H]  ----------------------------<  119[H]  3.8   |  26  |  5.89[H]    Ca    8.4      22 Jan 2025 00:11  Phos  4.6     01-22  Mg     2.4     01-22    TPro  5.6[L]  /  Alb  2.6[L]  /  TBili  0.5  /  DBili  x   /  AST  16  /  ALT  9[L]  /  AlkPhos  88  01-22    LIVER FUNCTIONS - ( 22 Jan 2025 00:11 )  Alb: 2.6 g/dL / Pro: 5.6 g/dL / ALK PHOS: 88 U/L / ALT: 9 U/L / AST: 16 U/L / GGT: x             ABG - ( 21 Jan 2025 23:46 )  pH, Arterial: 7.44  pH, Blood: x     /  pCO2: 43    /  pO2: 112   / HCO3: 29    / Base Excess: 4.5   /  SaO2: 99.2        ALL MEDICATIONS   MEDICATIONS  (STANDING):  albuterol    0.083% 2.5 milliGRAM(s) Nebulizer every 6 hours  aMIOdarone    Tablet   Oral   aMIOdarone    Tablet 200 milliGRAM(s) Oral daily  ascorbic acid 500 milliGRAM(s) Oral daily  aspirin  chewable 81 milliGRAM(s) Oral daily  atorvastatin 80 milliGRAM(s) Oral at bedtime  bisacodyl Suppository 10 milliGRAM(s) Rectal once  chlorhexidine 0.12% Liquid 15 milliLiter(s) Oral Mucosa every 12 hours  chlorhexidine 2% Cloths 1 Application(s) Topical daily  chlorhexidine 4% Liquid 1 Application(s) Topical <User Schedule>  dextrose 50% Injectable 50 milliLiter(s) IV Push every 15 minutes  epoetin ignacia (EPOGEN) Injectable 23265 Unit(s) IV Push <User Schedule>  gabapentin 100 milliGRAM(s) Oral every 12 hours  heparin   Injectable 5000 Unit(s) SubCutaneous every 8 hours  hydrALAZINE 10 milliGRAM(s) Oral every 8 hours  insulin lispro (ADMELOG) corrective regimen sliding scale   SubCutaneous every 6 hours  Nephro-elicia 1 Tablet(s) Oral daily  pantoprazole  Injectable 40 milliGRAM(s) IV Push daily  sodium chloride 0.65% Nasal 1 Spray(s) Both Nostrils every 12 hours  sodium chloride 0.9%. 1000 milliLiter(s) (10 mL/Hr) IV Continuous <Continuous>  sodium chloride 3%  Inhalation 4 milliLiter(s) Inhalation every 6 hours  traZODone 100 milliGRAM(s) Oral at bedtime    MEDICATIONS  (PRN):  benzocaine 20% Spray 1 Spray(s) Topical every 6 hours PRN throat pain  lidocaine/prilocaine Cream 1 Application(s) Topical daily PRN pre-HD  sodium chloride 0.9% lock flush 10 milliLiter(s) IV Push every 1 hour PRN Pre/post blood products, medications, blood draw, and to maintain line patency    ------------------------------------------------------------------------------------------------------------------------------  Assessment:  53 yo M s/p CABG x 3 on 12/30/24    Postop acute respiratory failure  Acute blood loss anemia  Ascites    ESRD on iHD   E coli bacteremia 2/2 pneumonia    Plan:  ***Neuro***  Postoperative acute pain control with Gabapentin and prns.  Trazodone nightly  PT ongoing    ***Cardiovascular***  s/p CABG x 3  A fib s/p cardioversion - Amiodarone daily.  Hydralazine for hypertension.  ASA / Statin daily    ***Pulmonary***  Postoperative acute respiratory failure  Tracheostomy 1/18   s/p Bronchoscopy 1/18. 1/20  Tolerates trach collar, keep on a vent at night  Start on 3 Nebs, albuterol    ***GI***  On Tube feeds at goal  Protonix for stress ulcer prophylaxis.   Ascites: Last paracentesis 1/11    ***Renal***  ESRD - last HD 1/17: -2.5L UF (MWF)  Nephro-Elicia for renal support    ***ID***  Sepsis/septic shock due to ESBL E. coli -  Meropenem. inhaled mary  Course completed  Trend leukocytosis   PO Vancomycin discontinued   Sacral wound - continue wound care, may benefit from a VAC    ***Endocrine***  Hyperglycemia - Insulin sliding scale    ***Hematology***  Acute blood loss anemia  CBC, coags  Continue  Epogen.  Heparin SQ for DVT        I, Lillie Kingsley MD, personally performed the services described in this documentation. all medical record entries made by the scribe were at my direction and in my presence. I have reviewed the chart and agree that the record reflects my personal performance and is accurate and complete  Electronically signed:   Lillie Kingsley MD  CT ICU attending     ICU time: ** mins     By signing my name below, I, Sonali Valle, attest that this documentation has been prepared under the direction and in the presence of Lillie Kingsley MD  Electronically signed: Sonali Valle, 01-22-25 @ 06:48             Patient seen and examined at the bedside.    Remains critically ill on continuous ICU monitoring, at risk for life threatening decompensation.  All Labs, data reviewed. Plan of care discussed in length during multi-disciplinary ICU rounds.     Brief Summary:  55 yo M with multiple medical problems including CAD s/p PCI in April 2024 s/p CABG x 3 on 12/30/24  1/2: Intubated for hypoxia & left lung white out s/p awake bronch with removal of copious mucopurulent secretions  1/4: s/p IABP insertion, sepsis/septic shock due to E coli   ESBL pneumonia, collapsed Left Lung, s/p multiple bronch and tracheostomy tube placement 1/18    24 Hour events:  Tolerates Bronch, BAL to follow  Continue PT  DTI, possible abscess on right buttocks    Seen by plastic and colorectal surgical team     Objective:  Vital Signs Last 24 Hrs  T(C): 36.6 (22 Jan 2025 04:00), Max: 36.9 (21 Jan 2025 12:00)  T(F): 97.9 (22 Jan 2025 04:00), Max: 98.5 (21 Jan 2025 12:00)  HR: 71 (22 Jan 2025 06:00) (63 - 85)  BP: --  BP(mean): --  RR: 18 (22 Jan 2025 06:00) (14 - 40)  SpO2: 100% (22 Jan 2025 06:00) (98% - 100%)    Parameters below as of 22 Jan 2025 05:26  Patient On (Oxygen Delivery Method): tracheostomy collar    Mode: CPAP with PS  FiO2: 40  PEEP: 8  PS: 15  MAP: 14  PIP: 24              Physical Exam:   General: Alert and interactive,   Neurology:, following commands. ambulates  Respiratory: Breath sounds bilaterally, trach collar  CV: Sinus  Abdominal: Soft, Nontender  Extremities: Warm, well-perfused    -------------------------------------------------------------------------------------------------------------------------------    Labs:                        9.8    9.24  )-----------( 325      ( 22 Jan 2025 00:11 )             31.6     01-22    134[L]  |  96  |  39[H]  ----------------------------<  119[H]  3.8   |  26  |  5.89[H]    Ca    8.4      22 Jan 2025 00:11  Phos  4.6     01-22  Mg     2.4     01-22    TPro  5.6[L]  /  Alb  2.6[L]  /  TBili  0.5  /  DBili  x   /  AST  16  /  ALT  9[L]  /  AlkPhos  88  01-22    LIVER FUNCTIONS - ( 22 Jan 2025 00:11 )  Alb: 2.6 g/dL / Pro: 5.6 g/dL / ALK PHOS: 88 U/L / ALT: 9 U/L / AST: 16 U/L / GGT: x             ABG - ( 21 Jan 2025 23:46 )  pH, Arterial: 7.44  pH, Blood: x     /  pCO2: 43    /  pO2: 112   / HCO3: 29    / Base Excess: 4.5   /  SaO2: 99.2        ALL MEDICATIONS   MEDICATIONS  (STANDING):  albuterol    0.083% 2.5 milliGRAM(s) Nebulizer every 6 hours  aMIOdarone    Tablet   Oral   aMIOdarone    Tablet 200 milliGRAM(s) Oral daily  ascorbic acid 500 milliGRAM(s) Oral daily  aspirin  chewable 81 milliGRAM(s) Oral daily  atorvastatin 80 milliGRAM(s) Oral at bedtime  bisacodyl Suppository 10 milliGRAM(s) Rectal once  chlorhexidine 0.12% Liquid 15 milliLiter(s) Oral Mucosa every 12 hours  chlorhexidine 2% Cloths 1 Application(s) Topical daily  chlorhexidine 4% Liquid 1 Application(s) Topical <User Schedule>  dextrose 50% Injectable 50 milliLiter(s) IV Push every 15 minutes  epoetin ignacia (EPOGEN) Injectable 21575 Unit(s) IV Push <User Schedule>  gabapentin 100 milliGRAM(s) Oral every 12 hours  heparin   Injectable 5000 Unit(s) SubCutaneous every 8 hours  hydrALAZINE 10 milliGRAM(s) Oral every 8 hours  insulin lispro (ADMELOG) corrective regimen sliding scale   SubCutaneous every 6 hours  Nephro-elicia 1 Tablet(s) Oral daily  pantoprazole  Injectable 40 milliGRAM(s) IV Push daily  sodium chloride 0.65% Nasal 1 Spray(s) Both Nostrils every 12 hours  sodium chloride 0.9%. 1000 milliLiter(s) (10 mL/Hr) IV Continuous <Continuous>  sodium chloride 3%  Inhalation 4 milliLiter(s) Inhalation every 6 hours  traZODone 100 milliGRAM(s) Oral at bedtime    MEDICATIONS  (PRN):  benzocaine 20% Spray 1 Spray(s) Topical every 6 hours PRN throat pain  lidocaine/prilocaine Cream 1 Application(s) Topical daily PRN pre-HD  sodium chloride 0.9% lock flush 10 milliLiter(s) IV Push every 1 hour PRN Pre/post blood products, medications, blood draw, and to maintain line patency    ------------------------------------------------------------------------------------------------------------------------------  Assessment:  55 yo M s/p CABG x 3 on 12/30/24    Postop acute respiratory failure  Acute blood loss anemia  Ascites    ESRD on iHD   E coli bacteremia 2/2 pneumonia    Plan:  ***Neuro***  Postoperative acute pain control with Gabapentin and prns.  Trazodone nightly  PT ongoing    ***Cardiovascular***  s/p CABG x 3  A fib s/p cardioversion - Amiodarone daily.  Hydralazine for hypertension.  ASA / Statin daily    ***Pulmonary***  Postoperative acute respiratory failure  Tracheostomy 1/18   s/p Bronchoscopy 1/18. 1/20, 1/21  Tolerates trach collar, keep on a vent at night  HS nebs,albuterol    ***GI***  On Tube feeds at goal  Protonix for stress ulcer prophylaxis.   Ascites: Last paracentesis 1/11    ***Renal***  ESRD - last HD 1/17: -2.5L UF (MWF)  Nephro-Elicia for renal support    ***ID***    ESBL E. coli -  Meropenem, tobramycin inh course completed  Possible abscess secondary to DTI  Will do Ct scan with IV contrast  Sacral wound - continue wound care, may benefit from a VAC    ***Endocrine***  Hyperglycemia - Insulin sliding scale    ***Hematology***  Acute blood loss anemia  CBC, coags  Continue  Epogen.  Heparin SQ for DVT    I, Lillie Kingsley MD, personally performed the services described in this documentation. all medical record entries made by the scribe were at my direction and in my presence. I have reviewed the chart and agree that the record reflects my personal performance and is accurate and complete  Electronically signed:   Lillie Kingsley MD  CT ICU attending     ICU time:52  mins     By signing my name below, I, Sonali Valle, attest that this documentation has been prepared under the direction and in the presence of Lillie Kingsley MD  Electronically signed: Sonali Valle, 01-22-25 @ 06:48

## 2025-01-23 LAB
ALBUMIN SERPL ELPH-MCNC: 2.6 G/DL — LOW (ref 3.3–5)
ALP SERPL-CCNC: 117 U/L — SIGNIFICANT CHANGE UP (ref 40–120)
ALT FLD-CCNC: 9 U/L — LOW (ref 10–45)
ANION GAP SERPL CALC-SCNC: 13 MMOL/L — SIGNIFICANT CHANGE UP (ref 5–17)
ANISOCYTOSIS BLD QL: SIGNIFICANT CHANGE UP
APTT BLD: 33.5 SEC — SIGNIFICANT CHANGE UP (ref 24.5–35.6)
AST SERPL-CCNC: 22 U/L — SIGNIFICANT CHANGE UP (ref 10–40)
BASOPHILS # BLD AUTO: 0.33 K/UL — HIGH (ref 0–0.2)
BASOPHILS NFR BLD AUTO: 3.5 % — HIGH (ref 0–2)
BILIRUB SERPL-MCNC: 0.6 MG/DL — SIGNIFICANT CHANGE UP (ref 0.2–1.2)
BUN SERPL-MCNC: 30 MG/DL — HIGH (ref 7–23)
CALCIUM SERPL-MCNC: 8.9 MG/DL — SIGNIFICANT CHANGE UP (ref 8.4–10.5)
CO2 SERPL-SCNC: 24 MMOL/L — SIGNIFICANT CHANGE UP (ref 22–31)
CREAT SERPL-MCNC: 4.69 MG/DL — HIGH (ref 0.5–1.3)
DACRYOCYTES BLD QL SMEAR: SLIGHT — SIGNIFICANT CHANGE UP
E FAECIUM DNA BLD POS QL NAA+NON-PROBE: SIGNIFICANT CHANGE UP
EGFR: 14 ML/MIN/1.73M2 — LOW
EGFR: 14 ML/MIN/1.73M2 — LOW
EOSINOPHIL # BLD AUTO: 0.97 K/UL — HIGH (ref 0–0.5)
EOSINOPHIL NFR BLD AUTO: 10.4 % — HIGH (ref 0–6)
GLUCOSE SERPL-MCNC: 109 MG/DL — HIGH (ref 70–99)
GRAM STN FLD: ABNORMAL
HCT VFR BLD CALC: 30.4 % — LOW (ref 39–50)
HGB BLD-MCNC: 9.7 G/DL — LOW (ref 13–17)
INR BLD: 1.39 RATIO — HIGH (ref 0.85–1.16)
LYMPHOCYTES # BLD AUTO: 0 % — LOW (ref 13–44)
LYMPHOCYTES # BLD AUTO: 0 K/UL — LOW (ref 1–3.3)
MACROCYTES BLD QL: SLIGHT — SIGNIFICANT CHANGE UP
MAGNESIUM SERPL-MCNC: 2.2 MG/DL — SIGNIFICANT CHANGE UP (ref 1.6–2.6)
MANUAL SMEAR VERIFICATION: SIGNIFICANT CHANGE UP
MCHC RBC-ENTMCNC: 28.6 PG — SIGNIFICANT CHANGE UP (ref 27–34)
MCHC RBC-ENTMCNC: 31.9 G/DL — LOW (ref 32–36)
MCV RBC AUTO: 89.7 FL — SIGNIFICANT CHANGE UP (ref 80–100)
METHOD TYPE: SIGNIFICANT CHANGE UP
MICROCYTES BLD QL: SLIGHT — SIGNIFICANT CHANGE UP
MONOCYTES # BLD AUTO: 0.81 K/UL — SIGNIFICANT CHANGE UP (ref 0–0.9)
MONOCYTES NFR BLD AUTO: 8.7 % — SIGNIFICANT CHANGE UP (ref 2–14)
NEUTROPHILS # BLD AUTO: 7.14 K/UL — SIGNIFICANT CHANGE UP (ref 1.8–7.4)
OVALOCYTES BLD QL SMEAR: SLIGHT — SIGNIFICANT CHANGE UP
PHOSPHATE SERPL-MCNC: 2.4 MG/DL — LOW (ref 2.5–4.5)
PLAT MORPH BLD: NORMAL — SIGNIFICANT CHANGE UP
PLATELET # BLD AUTO: 314 K/UL — SIGNIFICANT CHANGE UP (ref 150–400)
POLYCHROMASIA BLD QL SMEAR: SLIGHT — SIGNIFICANT CHANGE UP
POTASSIUM SERPL-MCNC: 3.5 MMOL/L — SIGNIFICANT CHANGE UP (ref 3.5–5.3)
POTASSIUM SERPL-SCNC: 3.5 MMOL/L — SIGNIFICANT CHANGE UP (ref 3.5–5.3)
PROTHROM AB SERPL-ACNC: 15.8 SEC — HIGH (ref 9.9–13.4)
RBC # BLD: 3.39 M/UL — LOW (ref 4.2–5.8)
RBC # FLD: 19.2 % — HIGH (ref 10.3–14.5)
SCHISTOCYTES BLD QL AUTO: SLIGHT — SIGNIFICANT CHANGE UP
SODIUM SERPL-SCNC: 134 MMOL/L — LOW (ref 135–145)
SPECIMEN SOURCE: SIGNIFICANT CHANGE UP
TARGETS BLD QL SMEAR: SLIGHT — SIGNIFICANT CHANGE UP
VARIANT LYMPHS # BLD: 0.9 % — SIGNIFICANT CHANGE UP (ref 0–6)
VARIANT LYMPHS NFR BLD MANUAL: 0.9 % — SIGNIFICANT CHANGE UP (ref 0–6)
WBC # BLD: 9.33 K/UL — SIGNIFICANT CHANGE UP (ref 3.8–10.5)
WBC # FLD AUTO: 9.33 K/UL — SIGNIFICANT CHANGE UP (ref 3.8–10.5)

## 2025-01-23 PROCEDURE — 71260 CT THORAX DX C+: CPT | Mod: 26

## 2025-01-23 PROCEDURE — 99233 SBSQ HOSP IP/OBS HIGH 50: CPT

## 2025-01-23 PROCEDURE — G0545: CPT

## 2025-01-23 PROCEDURE — 99291 CRITICAL CARE FIRST HOUR: CPT

## 2025-01-23 PROCEDURE — 74177 CT ABD & PELVIS W/CONTRAST: CPT | Mod: 26

## 2025-01-23 PROCEDURE — 71045 X-RAY EXAM CHEST 1 VIEW: CPT | Mod: 26

## 2025-01-23 RX ORDER — BISACODYL 5 MG
10 TABLET, DELAYED RELEASE (ENTERIC COATED) ORAL ONCE
Refills: 0 | Status: COMPLETED | OUTPATIENT
Start: 2025-01-23 | End: 2025-01-23

## 2025-01-23 RX ORDER — HYDROMORPHONE/SOD CHLOR,ISO/PF 2 MG/10 ML
0.5 SYRINGE (ML) INJECTION ONCE
Refills: 0 | Status: DISCONTINUED | OUTPATIENT
Start: 2025-01-23 | End: 2025-01-23

## 2025-01-23 RX ORDER — ACETAMINOPHEN 500 MG/5ML
1000 LIQUID (ML) ORAL ONCE
Refills: 0 | Status: COMPLETED | OUTPATIENT
Start: 2025-01-23 | End: 2025-01-23

## 2025-01-23 RX ORDER — POLYETHYLENE GLYCOL 3350 17 G/17G
17 POWDER, FOR SOLUTION ORAL
Refills: 0 | Status: DISCONTINUED | OUTPATIENT
Start: 2025-01-23 | End: 2025-02-16

## 2025-01-23 RX ORDER — LINEZOLID 2 MG/ML
INJECTION, SOLUTION INTRAVENOUS
Refills: 0 | Status: DISCONTINUED | OUTPATIENT
Start: 2025-01-23 | End: 2025-02-03

## 2025-01-23 RX ORDER — HYDROMORPHONE/SOD CHLOR,ISO/PF 2 MG/10 ML
0.25 SYRINGE (ML) INJECTION ONCE
Refills: 0 | Status: DISCONTINUED | OUTPATIENT
Start: 2025-01-23 | End: 2025-01-23

## 2025-01-23 RX ORDER — LINEZOLID 2 MG/ML
600 INJECTION, SOLUTION INTRAVENOUS EVERY 12 HOURS
Refills: 0 | Status: DISCONTINUED | OUTPATIENT
Start: 2025-01-23 | End: 2025-02-03

## 2025-01-23 RX ORDER — ALBUMIN (HUMAN) 12.5 G/50ML
250 INJECTION, SOLUTION INTRAVENOUS ONCE
Refills: 0 | Status: DISCONTINUED | OUTPATIENT
Start: 2025-01-23 | End: 2025-01-23

## 2025-01-23 RX ORDER — LINEZOLID 2 MG/ML
600 INJECTION, SOLUTION INTRAVENOUS ONCE
Refills: 0 | Status: COMPLETED | OUTPATIENT
Start: 2025-01-23 | End: 2025-01-23

## 2025-01-23 RX ADMIN — LINEZOLID 300 MILLIGRAM(S): 2 INJECTION, SOLUTION INTRAVENOUS at 14:34

## 2025-01-23 RX ADMIN — Medication 10 MILLIGRAM(S): at 17:24

## 2025-01-23 RX ADMIN — HEPARIN SODIUM 5000 UNIT(S): 1000 INJECTION INTRAVENOUS; SUBCUTANEOUS at 05:10

## 2025-01-23 RX ADMIN — Medication 400 MILLIGRAM(S): at 06:55

## 2025-01-23 RX ADMIN — Medication 500 MILLIGRAM(S): at 13:46

## 2025-01-23 RX ADMIN — Medication 1 SPRAY(S): at 17:25

## 2025-01-23 RX ADMIN — Medication 2.5 MILLIGRAM(S): at 23:08

## 2025-01-23 RX ADMIN — Medication 400 MILLIGRAM(S): at 23:30

## 2025-01-23 RX ADMIN — Medication 10 MILLILITER(S): at 19:00

## 2025-01-23 RX ADMIN — Medication 4 MILLILITER(S): at 17:40

## 2025-01-23 RX ADMIN — Medication 0.5 MILLIGRAM(S): at 07:10

## 2025-01-23 RX ADMIN — HEPARIN SODIUM 5000 UNIT(S): 1000 INJECTION INTRAVENOUS; SUBCUTANEOUS at 21:41

## 2025-01-23 RX ADMIN — Medication 0.5 MILLIGRAM(S): at 09:58

## 2025-01-23 RX ADMIN — MEROPENEM 100 MILLIGRAM(S): 1 INJECTION INTRAVENOUS at 11:35

## 2025-01-23 RX ADMIN — HEPARIN SODIUM 5000 UNIT(S): 1000 INJECTION INTRAVENOUS; SUBCUTANEOUS at 13:10

## 2025-01-23 RX ADMIN — Medication 400 MILLIGRAM(S): at 17:14

## 2025-01-23 RX ADMIN — Medication 1000 MILLIGRAM(S): at 17:41

## 2025-01-23 RX ADMIN — Medication 0.5 MILLIGRAM(S): at 23:30

## 2025-01-23 RX ADMIN — Medication 10 MILLIGRAM(S): at 02:34

## 2025-01-23 RX ADMIN — Medication 15 MILLILITER(S): at 05:07

## 2025-01-23 RX ADMIN — Medication 0.5 MILLIGRAM(S): at 23:45

## 2025-01-23 RX ADMIN — Medication 0.25 MILLIGRAM(S): at 00:55

## 2025-01-23 RX ADMIN — Medication 1000 MILLIGRAM(S): at 07:10

## 2025-01-23 RX ADMIN — Medication 10 MILLIGRAM(S): at 05:11

## 2025-01-23 RX ADMIN — Medication 4 MILLILITER(S): at 05:08

## 2025-01-23 RX ADMIN — POLYETHYLENE GLYCOL 3350 17 GRAM(S): 17 POWDER, FOR SOLUTION ORAL at 17:23

## 2025-01-23 RX ADMIN — Medication 81 MILLIGRAM(S): at 11:35

## 2025-01-23 RX ADMIN — AMIODARONE HYDROCHLORIDE 200 MILLIGRAM(S): 50 INJECTION, SOLUTION INTRAVENOUS at 05:11

## 2025-01-23 RX ADMIN — Medication 0.5 MILLIGRAM(S): at 06:55

## 2025-01-23 RX ADMIN — Medication 0.5 MILLIGRAM(S): at 10:45

## 2025-01-23 RX ADMIN — ATORVASTATIN CALCIUM 80 MILLIGRAM(S): 80 TABLET, FILM COATED ORAL at 21:40

## 2025-01-23 RX ADMIN — Medication 40 MILLIGRAM(S): at 11:35

## 2025-01-23 RX ADMIN — Medication 0.25 MILLIGRAM(S): at 00:40

## 2025-01-23 RX ADMIN — Medication 4 MILLILITER(S): at 23:08

## 2025-01-23 RX ADMIN — Medication 1 APPLICATION(S): at 21:37

## 2025-01-23 RX ADMIN — Medication 2.5 MILLIGRAM(S): at 11:35

## 2025-01-23 RX ADMIN — Medication 0.5 MILLIGRAM(S): at 17:15

## 2025-01-23 RX ADMIN — METHOCARBAMOL 500 MILLIGRAM(S): 500 TABLET, FILM COATED ORAL at 21:40

## 2025-01-23 RX ADMIN — GABAPENTIN 100 MILLIGRAM(S): 400 CAPSULE ORAL at 17:24

## 2025-01-23 RX ADMIN — MIRTAZAPINE 7.5 MILLIGRAM(S): 30 TABLET, FILM COATED ORAL at 11:35

## 2025-01-23 RX ADMIN — Medication 0.5 MILLIGRAM(S): at 17:41

## 2025-01-23 RX ADMIN — Medication 4 MILLILITER(S): at 11:35

## 2025-01-23 RX ADMIN — METHOCARBAMOL 500 MILLIGRAM(S): 500 TABLET, FILM COATED ORAL at 05:11

## 2025-01-23 RX ADMIN — Medication 1 APPLICATION(S): at 05:08

## 2025-01-23 RX ADMIN — Medication 1 TABLET(S): at 11:36

## 2025-01-23 RX ADMIN — METHOCARBAMOL 500 MILLIGRAM(S): 500 TABLET, FILM COATED ORAL at 13:10

## 2025-01-23 RX ADMIN — Medication 1000 MILLIGRAM(S): at 23:45

## 2025-01-23 RX ADMIN — Medication 2.5 MILLIGRAM(S): at 17:38

## 2025-01-23 RX ADMIN — Medication 2.5 MILLIGRAM(S): at 05:08

## 2025-01-23 RX ADMIN — Medication 100 MILLIGRAM(S): at 21:43

## 2025-01-23 NOTE — PROGRESS NOTE ADULT - ASSESSMENT
54M with PMH of HTN, HLD, ESRD on HD MWF via LUE AVF, ascites (requiring repeat paracenteses q4-6wks), NICM with moderate LV dysfunction w/ EF 35-40%(2023) and CAD s/p atherectomy + SALLIE to D1(4/11/2024) presents to [facility] transferred from [facility] for CABG eval  Nephro on [facility] for HD    ESRD on HD   Regular schedule MWF   Access LUE AVF  [facility]  Nephrologist Dr. Bailey  Last HD on 12/24  S/p HD on 12/27 12/29, 12/30 for hyperkalemia and 12/31  2 L PUF On 01/02/2024  However hospital course now complicated by Cardiogenic shock now on multiple pressors,   CRRT initiated 01/03, off since 01/08   S/p PUF on 01/09   HD held on 01/10 due to instability,  S/p HD on 01/17, 1/20  S/p HD on 01/22, will arrange for HD on 01/25, okay to recieve contrast today  Keep MAP>65  Renal Diet  Consent in physical chart    Hyper/Hypokalemia  Monitor K, will change dialysate fluid as needed    HTN  currently on pressure support  monitor bp     CKD MBD  Can send a PTH  Ca and Phos daily    Anemia  CRISTIANE with HD  Transfuse if hgb <7    CAD with multivessel disease  s/p CABG 12/30  CTICU following    Hypoxic Resp failure requiring Trach  Per surgery

## 2025-01-23 NOTE — PROGRESS NOTE ADULT - SUBJECTIVE AND OBJECTIVE BOX
Surgery Progress Note     Overnight events:     Interval/Subjective:  Patient seen and examined.   __________________________________________________________________  Vitals:  T(C): 36.9 (04:00), Max: 36.9 (15:10)  HR: 63 (06:00) (57 - 82)  BP: 155/63 (06:00) (105/59 - 205/79)  RR: 22 (06:00) (10 - 31)  SpO2: 100% (06:00) (91% - 100%)    I & O's:  IN:    Enteral Tube Flush: 50 mL    IV PiggyBack: 450 mL    Nepro with Carb Steady: 1150 mL    sodium chloride 0.9%: 115 mL  Total IN: 1765 mL    OUT:    Other (mL) - Hemodialysis: 2000 mL  Total OUT: 2000 mL        Physical Exam:  General: NAD, resting comfortably in bed  Abdomen: soft, nontender, nondistended      __________________________________________________________________ Surgery Progress Note     Overnight events:     Interval/Subjective:  Patient seen and examined.   __________________________________________________________________  Vitals:  T(C): 36.9 (04:00), Max: 36.9 (15:10)  HR: 63 (06:00) (57 - 82)  BP: 155/63 (06:00) (105/59 - 205/79)  RR: 22 (06:00) (10 - 31)  SpO2: 100% (06:00) (91% - 100%)    I & O's:  IN:    Enteral Tube Flush: 50 mL    IV PiggyBack: 450 mL    Nepro with Carb Steady: 1150 mL    sodium chloride 0.9%: 115 mL  Total IN: 1765 mL    OUT:    Other (mL) - Hemodialysis: 2000 mL  Total OUT: 2000 mL        Physical Exam:  General: NAD, resting comfortably in bed  Abdomen: soft, nontender, nondistended  Rectal exam shows no areas of tenderness/ induration/ fluctuance      __________________________________________________________________

## 2025-01-23 NOTE — PATIENT PROFILE ADULT - FUNCTIONAL ASSESSMENT - DAILY ACTIVITY 2.
Speech Therapy      Visit Type: Evaluation  -  Clinical swallow  Reason for consult: Per RN, patient failed the nursing swallow screen and has been coughing with thin liquids. Patient is 89 year old female admitted with hypernatremia and urinary tract infection.     Relevant History/Co-morbidities: dementia    SUBJECTIVE  Patient was lethargic, briefly waking and then quickly falling back asleep.       - Patient/family goals: unable to state   Pain at onset of session:   Patient does not demonstrate pain behaviors.      OBJECTIVE     Oral Mechanism   Oral Motor Assessment: unable to participate      Diet prior to visit (see the Recommendations section below for up-to-date swallow recommendations): Liquid- Thin and IDDSI 4 Pureed  - Feeding: feeds self    Swallow  Patient positioning: upright in bed  Pretrial vocal quality: weak    Numbers listed with consistency indicate IDDSI diet level.    Thin (0); cup; SLP fed   - Oral phase: impaired, anterior loss   - Pharyngeal phase: multiple swallows noted, immediate cough and vocal quality change    Moderately thick (3); cup   - Oral: WFL   - Pharyngeal phase: multiple swallows noted and vocal quality change    Pureed (4)   - Oral: impaired, slow oral transit   - Pharyngeal phase: multiple swallows noted                  Education:   - Present and not ready to learn: patient  Education provided during session:  - Results of above outlined education: No evidence of learning    ASSESSMENT  Patient will benefit from skilled therapy to address listed impairments and functional limitations.    Interfering components: current medical conditions    Discharge needs based on today's assessment:  - ADL / IADLs (activities / instrumental activities of daily living) requiring support at discharge: compensatory strategies  - Impairments that require further therapy intervention: dysphagia    Patient exhibits suspected oropharyngeal dysphagia. Oral phase was characterized by slow oral  transport and delayed initiation. Patient also exhibited anterior bolus loss of thin liquids. There were moderately thick liquids at the bedside and RN reported patient was receiving thickened liquids overnight and bolus control appeared improved with thickened liquids as compared to thin. Patient utilized multiple swallows per bolus. Provided a single sip of thin liquid, patient swallowed 5 times. There was immediate coughing appreciated with thin liquids and changes in vocal quality with thin and thickened liquids. Additional presentations discontinued as patient was falling asleep.    Discussed with Dr. Roberson, patient remains alerted. Plan to re-assess swallow tomorrow. Recommend NPO with exceptions for medications and ice chips.    PLAN (while hospitalized)  Re-bse     SLP Frequency: 6-7 x per week    SLP Identified Barriers to Discharge: nutrition plan  Interventions:  Patient/family education and reassess swallow function as appropriate      RECOMMENDATIONS     -Diet:          *nothing by mouth except oral meds as needed    -Medication Administration:         *crushed and with puree    -Additional Swallow Recommendations:         *frequent oral care, re-evaluate swallow and consider ice chips    -Speech Reviewed Swallow:         *with patient/family and with clinical caregivers  -Additional recommendations: Patient may have a few ice chips administered under direct nursing supervision to maintain a moistened oral cavity, for therapeutic swallowing purposes and for comfort after oral care has been completed.      GOALS    Long Term Goals: (to be met by time of discharge from hospital)  Patient will manage an oral diet with no sequelae of aspiration in order to achieve adequate nutrition/hydration by mouth.    Patient at End of Session:   Location: in bed  Safety measures: call light within reach and equipment intact  Handoff to: nurse  Documented in the chart in the following areas: Assessment.       Therapy  procedure time and total treatment time can be found documented on the Time Entry flowsheet   4 = No assist / stand by assistance

## 2025-01-23 NOTE — PROGRESS NOTE ADULT - SUBJECTIVE AND OBJECTIVE BOX
Patient is a 54y old  Male who presents with a chief complaint of s/p CABG x 3 (23 Jan 2025 07:06)    Being followed by ID for        Interval history:  No other acute events      ROS:  No cough,SOB,CP  No N/V/D  No abd pain  No urinary complaints  No HA  No joint or limb pain  No other complaints    PAST MEDICAL & SURGICAL HISTORY:  Type 2 diabetes mellitus      Hypertension      End stage renal disease  started HD 2/2019 T, Th, Sat via right chest permacath      Bone spur  right shoulder- hx of - sx done      Anemia      Injury of right wrist  hx of at age 14      History of hyperkalemia  before HD- K-6.2 - to repeat in am of sx, pt had HD today      S/P arthroscopy of right shoulder  2010      History of vascular access device  right chest permacath - 2/2019      H/O right wrist surgery  tendons repair - at age 14      AV fistula      H/O ventral hernia repair      S/P cholecystectomy        Allergies    No Known Allergies    Intolerances      Antimicrobials:    meropenem  IVPB 500 milliGRAM(s) IV Intermittent every 24 hours  meropenem  IVPB      vancomycin  IVPB      vancomycin  IVPB 1000 milliGRAM(s) IV Intermittent every 24 hours    MEDICATIONS  (STANDING):  albuterol    0.083% 2.5 milliGRAM(s) Nebulizer every 6 hours  aMIOdarone    Tablet   Oral   aMIOdarone    Tablet 200 milliGRAM(s) Oral daily  ascorbic acid 500 milliGRAM(s) Oral daily  aspirin  chewable 81 milliGRAM(s) Oral daily  atorvastatin 80 milliGRAM(s) Oral at bedtime  bisacodyl Suppository 10 milliGRAM(s) Rectal once  chlorhexidine 0.12% Liquid 15 milliLiter(s) Oral Mucosa every 12 hours  chlorhexidine 2% Cloths 1 Application(s) Topical daily  chlorhexidine 4% Liquid 1 Application(s) Topical <User Schedule>  dextrose 50% Injectable 50 milliLiter(s) IV Push every 15 minutes  epoetin ignacia (EPOGEN) Injectable 55366 Unit(s) IV Push <User Schedule>  gabapentin 100 milliGRAM(s) Oral every 12 hours  heparin   Injectable 5000 Unit(s) SubCutaneous every 8 hours  insulin lispro (ADMELOG) corrective regimen sliding scale   SubCutaneous every 6 hours  meropenem  IVPB 500 milliGRAM(s) IV Intermittent every 24 hours  meropenem  IVPB      methocarbamol 500 milliGRAM(s) Oral every 8 hours  mirtazapine 7.5 milliGRAM(s) Oral daily  Nephro-elicia 1 Tablet(s) Oral daily  pantoprazole  Injectable 40 milliGRAM(s) IV Push daily  sodium chloride 0.65% Nasal 1 Spray(s) Both Nostrils every 12 hours  sodium chloride 0.9%. 1000 milliLiter(s) (10 mL/Hr) IV Continuous <Continuous>  sodium chloride 3%  Inhalation 4 milliLiter(s) Inhalation every 6 hours  traZODone 100 milliGRAM(s) Oral at bedtime  vancomycin  IVPB      vancomycin  IVPB 1000 milliGRAM(s) IV Intermittent every 24 hours      Vital Signs Last 24 Hrs  T(C): 36.9 (01-23-25 @ 04:00), Max: 36.9 (01-22-25 @ 15:10)  T(F): 98.4 (01-23-25 @ 04:00), Max: 98.5 (01-22-25 @ 15:10)  HR: 75 (01-23-25 @ 08:35) (57 - 82)  BP: 111/44 (01-23-25 @ 07:00) (105/59 - 205/79)  BP(mean): 64 (01-23-25 @ 07:00) (64 - 114)  RR: 22 (01-23-25 @ 07:00) (10 - 31)  SpO2: 100% (01-23-25 @ 08:35) (91% - 100%)    Physical Exam:    Constitutional well preserved,comfortable,pleasant    HEENT PERRLA EOMI,No pallor or icterus    No oral exudate or erythema    Neck supple no JVD or LN    Chest Good AE,CTA    CVS RRR S1 S2 WNl No murmur or rub or gallop    Abd soft BS normal No tenderness no masses    Ext No cyanosis clubbing or edema    IV site no erythema tenderness or discharge    Joints no swelling or LOM    CNS AAO X 3 no focal    Lab Data:                          9.7    9.33  )-----------( 314      ( 23 Jan 2025 00:33 )             30.4       01-23    134[L]  |  97  |  30[H]  ----------------------------<  109[H]  3.5   |  24  |  4.69[H]    Ca    8.9      23 Jan 2025 00:33  Phos  2.4     01-23  Mg     2.2     01-23    TPro  5.8[L]  /  Alb  2.6[L]  /  TBili  0.6  /  DBili  x   /  AST  22  /  ALT  9[L]  /  AlkPhos  117  01-23      Urinalysis Basic - ( 23 Jan 2025 00:33 )    Color: x / Appearance: x / SG: x / pH: x  Gluc: 109 mg/dL / Ketone: x  / Bili: x / Urobili: x   Blood: x / Protein: x / Nitrite: x   Leuk Esterase: x / RBC: x / WBC x   Sq Epi: x / Non Sq Epi: x / Bacteria: x        .Blood BLOOD  01-22-25   Growth in anaerobic bottle: Gram Positive Cocci in Pairs and Chains  Direct identification is available within approximately 3-5  hours either by Blood Panel Multiplexed PCR or Direct  MALDI-TOF. Details: [email or web address]  --    Growth in anaerobic bottle: Gram Positive Cocci in Pairs and Chains      Bronchial  01-20-25   Commensal manjit consistent with body site  --    Moderate polymorphonuclear leukocytes per low power field  Rare Squamous epithelial cells per low power field  No organisms seen per oil power field      Bronchial  01-18-25   Commensal manjit consistent with body site  --    Few polymorphonuclear leukocytes seen per low power field  No Squamous epithelial cells seen per low power field  No organisms seen per oil power field                    WBC Count: 9.33 (01-23-25 @ 00:33)  WBC Count: 9.24 (01-22-25 @ 00:11)  WBC Count: 9.50 (01-21-25 @ 01:52)  WBC Count: 9.76 (01-21-25 @ 01:23)  WBC Count: 10.43 (01-20-25 @ 00:29)  WBC Count: 9.52 (01-19-25 @ 00:21)  WBC Count: 9.61 (01-18-25 @ 00:51)  WBC Count: 11.50 (01-17-25 @ 00:33)       Bilirubin Total: 0.6 mg/dL (01-23-25 @ 00:33)  Aspartate Aminotransferase (AST/SGOT): 22 U/L (01-23-25 @ 00:33)  Alanine Aminotransferase (ALT/SGPT): 9 U/L (01-23-25 @ 00:33)  Alkaline Phosphatase: 117 U/L (01-23-25 @ 00:33)  Bilirubin Total: 0.5 mg/dL (01-22-25 @ 00:11)  Aspartate Aminotransferase (AST/SGOT): 16 U/L (01-22-25 @ 00:11)  Alanine Aminotransferase (ALT/SGPT): 9 U/L (01-22-25 @ 00:11)  Alkaline Phosphatase: 88 U/L (01-22-25 @ 00:11)  Bilirubin Total: 0.6 mg/dL (01-21-25 @ 01:52)  Aspartate Aminotransferase (AST/SGOT): 16 U/L (01-21-25 @ 01:52)  Alanine Aminotransferase (ALT/SGPT): 9 U/L (01-21-25 @ 01:52)  Alkaline Phosphatase: 90 U/L (01-21-25 @ 01:52)  Bilirubin Total: 0.6 mg/dL (01-21-25 @ 01:23)  Aspartate Aminotransferase (AST/SGOT): 22 U/L (01-21-25 @ 01:23)  Alanine Aminotransferase (ALT/SGPT): 9 U/L (01-21-25 @ 01:23)  Alkaline Phosphatase: 96 U/L (01-21-25 @ 01:23)  Bilirubin Total: 0.5 mg/dL (01-20-25 @ 00:29)  Aspartate Aminotransferase (AST/SGOT): 18 U/L (01-20-25 @ 00:29)  Alanine Aminotransferase (ALT/SGPT): 9 U/L (01-20-25 @ 00:29)  Alkaline Phosphatase: 91 U/L (01-20-25 @ 00:29)  Bilirubin Total: 0.5 mg/dL (01-19-25 @ 00:21)  Aspartate Aminotransferase (AST/SGOT): 20 U/L (01-19-25 @ 00:21)  Alanine Aminotransferase (ALT/SGPT): 10 U/L (01-19-25 @ 00:21)  Alkaline Phosphatase: 81 U/L (01-19-25 @ 00:21)         Patient is a 54y old  Male who presents with a chief complaint of s/p CABG x 3 (23 Jan 2025 07:06)    Being followed by ID for        Interval history:  pt found to have VREC bacteremia   Seen by surgery   for imaging later today to look for abscess  respiratory status stable   No other acute events        PAST MEDICAL & SURGICAL HISTORY:  Type 2 diabetes mellitus      Hypertension      End stage renal disease  started HD 2/2019 T, Th, Sat via right chest permacath      Bone spur  right shoulder- hx of - sx done      Anemia      Injury of right wrist  hx of at age 14      History of hyperkalemia  before HD- K-6.2 - to repeat in am of sx, pt had HD today      S/P arthroscopy of right shoulder  2010      History of vascular access device  right chest permacath - 2/2019      H/O right wrist surgery  tendons repair - at age 14      AV fistula      H/O ventral hernia repair      S/P cholecystectomy        Allergies    No Known Allergies    Intolerances      Antimicrobials:    meropenem  IVPB 500 milliGRAM(s) IV Intermittent every 24 hours  meropenem  IVPB      vancomycin  IVPB      vancomycin  IVPB 1000 milliGRAM(s) IV Intermittent every 24 hours    MEDICATIONS  (STANDING):  albuterol    0.083% 2.5 milliGRAM(s) Nebulizer every 6 hours  aMIOdarone    Tablet   Oral   aMIOdarone    Tablet 200 milliGRAM(s) Oral daily  ascorbic acid 500 milliGRAM(s) Oral daily  aspirin  chewable 81 milliGRAM(s) Oral daily  atorvastatin 80 milliGRAM(s) Oral at bedtime  bisacodyl Suppository 10 milliGRAM(s) Rectal once  chlorhexidine 0.12% Liquid 15 milliLiter(s) Oral Mucosa every 12 hours  chlorhexidine 2% Cloths 1 Application(s) Topical daily  chlorhexidine 4% Liquid 1 Application(s) Topical <User Schedule>  dextrose 50% Injectable 50 milliLiter(s) IV Push every 15 minutes  epoetin ignacia (EPOGEN) Injectable 84906 Unit(s) IV Push <User Schedule>  gabapentin 100 milliGRAM(s) Oral every 12 hours  heparin   Injectable 5000 Unit(s) SubCutaneous every 8 hours  insulin lispro (ADMELOG) corrective regimen sliding scale   SubCutaneous every 6 hours  meropenem  IVPB 500 milliGRAM(s) IV Intermittent every 24 hours  meropenem  IVPB      methocarbamol 500 milliGRAM(s) Oral every 8 hours  mirtazapine 7.5 milliGRAM(s) Oral daily  Nephro-elicia 1 Tablet(s) Oral daily  pantoprazole  Injectable 40 milliGRAM(s) IV Push daily  sodium chloride 0.65% Nasal 1 Spray(s) Both Nostrils every 12 hours  sodium chloride 0.9%. 1000 milliLiter(s) (10 mL/Hr) IV Continuous <Continuous>  sodium chloride 3%  Inhalation 4 milliLiter(s) Inhalation every 6 hours  traZODone 100 milliGRAM(s) Oral at bedtime  vancomycin  IVPB      vancomycin  IVPB 1000 milliGRAM(s) IV Intermittent every 24 hours      Vital Signs Last 24 Hrs  T(C): 36.9 (01-23-25 @ 04:00), Max: 36.9 (01-22-25 @ 15:10)  T(F): 98.4 (01-23-25 @ 04:00), Max: 98.5 (01-22-25 @ 15:10)  HR: 75 (01-23-25 @ 08:35) (57 - 82)  BP: 111/44 (01-23-25 @ 07:00) (105/59 - 205/79)  BP(mean): 64 (01-23-25 @ 07:00) (64 - 114)  RR: 22 (01-23-25 @ 07:00) (10 - 31)  SpO2: 100% (01-23-25 @ 08:35) (91% - 100%)    Physical Exam:    Constitutional   pt awake and alert     HEENT PERRLA EOMI,No pallor or icterus    Neck trach     Chest Good AE,CTA    CVS  S1 S2   Abd soft BS normal No tenderness     Ext No cyanosis clubbing or edema    IV site no erythema tenderness or discharge    Joints no swelling or LOM    CNS AAO X 3 no focal    Lab Data:                          9.7    9.33  )-----------( 314      ( 23 Jan 2025 00:33 )             30.4       01-23    134[L]  |  97  |  30[H]  ----------------------------<  109[H]  3.5   |  24  |  4.69[H]    Ca    8.9      23 Jan 2025 00:33  Phos  2.4     01-23  Mg     2.2     01-23    TPro  5.8[L]  /  Alb  2.6[L]  /  TBili  0.6  /  DBili  x   /  AST  22  /  ALT  9[L]  /  AlkPhos  117  01-23      Urinalysis Basic - ( 23 Jan 2025 00:33 )    Color: x / Appearance: x / SG: x / pH: x  Gluc: 109 mg/dL / Ketone: x  / Bili: x / Urobili: x   Blood: x / Protein: x / Nitrite: x   Leuk Esterase: x / RBC: x / WBC x   Sq Epi: x / Non Sq Epi: x / Bacteria: x        .Blood BLOOD  01-22-25   Growth in anaerobic bottle: Gram Positive Cocci in Pairs and Chains  Direct identification is available within approximately 3-5  hours either by Blood Panel Multiplexed PCR or Direct  MALDI-TOF. Details: [email or web address]  --    Growth in anaerobic bottle: Gram Positive Cocci in Pairs and Chains  -  Enterococcus faecium,VRE: Detec (01.22.25 @ 12:20)        Bronchial  01-20-25   Commensal manjit consistent with body site  --    Moderate polymorphonuclear leukocytes per low power field  Rare Squamous epithelial cells per low power field  No organisms seen per oil power field      Bronchial  01-18-25   Commensal manjit consistent with body site  --    Few polymorphonuclear leukocytes seen per low power field  No Squamous epithelial cells seen per low power field  No organisms seen per oil power field      WBC Count: 9.33 (01-23-25 @ 00:33)  WBC Count: 9.24 (01-22-25 @ 00:11)  WBC Count: 9.50 (01-21-25 @ 01:52)  WBC Count: 9.76 (01-21-25 @ 01:23)  WBC Count: 10.43 (01-20-25 @ 00:29)  WBC Count: 9.52 (01-19-25 @ 00:21)  WBC Count: 9.61 (01-18-25 @ 00:51)  WBC Count: 11.50 (01-17-25 @ 00:33)       Bilirubin Total: 0.6 mg/dL (01-23-25 @ 00:33)  Aspartate Aminotransferase (AST/SGOT): 22 U/L (01-23-25 @ 00:33)  Alanine Aminotransferase (ALT/SGPT): 9 U/L (01-23-25 @ 00:33)  Alkaline Phosphatase: 117 U/L (01-23-25 @ 00:33)    Bilirubin Total: 0.5 mg/dL (01-22-25 @ 00:11)  Aspartate Aminotransferase (AST/SGOT): 16 U/L (01-22-25 @ 00:11)  Alanine Aminotransferase (ALT/SGPT): 9 U/L (01-22-25 @ 00:11)  Alkaline Phosphatase: 88 U/L (01-22-25 @ 00:11)           Patient is a 54y old  Male who presents with a chief complaint of s/p CABG x 3 (23 Jan 2025 07:06)    Being followed by ID for        Interval history:  pt found to have VREC bacteremia   Seen by surgery   for imaging later today to look for abscess  respiratory status stable   No other acute events        PAST MEDICAL & SURGICAL HISTORY:  Type 2 diabetes mellitus      Hypertension      End stage renal disease  started HD 2/2019 T, Th, Sat via right chest permacath      Bone spur  right shoulder- hx of - sx done      Anemia      Injury of right wrist  hx of at age 14      History of hyperkalemia  before HD- K-6.2 - to repeat in am of sx, pt had HD today      S/P arthroscopy of right shoulder  2010      History of vascular access device  right chest permacath - 2/2019      H/O right wrist surgery  tendons repair - at age 14      AV fistula      H/O ventral hernia repair      S/P cholecystectomy        Allergies    No Known Allergies    Intolerances      Antimicrobials:    meropenem  IVPB 500 milliGRAM(s) IV Intermittent every 24 hours  meropenem  IVPB      vancomycin  IVPB      vancomycin  IVPB 1000 milliGRAM(s) IV Intermittent every 24 hours    MEDICATIONS  (STANDING):  albuterol    0.083% 2.5 milliGRAM(s) Nebulizer every 6 hours  aMIOdarone    Tablet   Oral   aMIOdarone    Tablet 200 milliGRAM(s) Oral daily  ascorbic acid 500 milliGRAM(s) Oral daily  aspirin  chewable 81 milliGRAM(s) Oral daily  atorvastatin 80 milliGRAM(s) Oral at bedtime  bisacodyl Suppository 10 milliGRAM(s) Rectal once  chlorhexidine 0.12% Liquid 15 milliLiter(s) Oral Mucosa every 12 hours  chlorhexidine 2% Cloths 1 Application(s) Topical daily  chlorhexidine 4% Liquid 1 Application(s) Topical <User Schedule>  dextrose 50% Injectable 50 milliLiter(s) IV Push every 15 minutes  epoetin ignacia (EPOGEN) Injectable 36678 Unit(s) IV Push <User Schedule>  gabapentin 100 milliGRAM(s) Oral every 12 hours  heparin   Injectable 5000 Unit(s) SubCutaneous every 8 hours  insulin lispro (ADMELOG) corrective regimen sliding scale   SubCutaneous every 6 hours  meropenem  IVPB 500 milliGRAM(s) IV Intermittent every 24 hours  meropenem  IVPB      methocarbamol 500 milliGRAM(s) Oral every 8 hours  mirtazapine 7.5 milliGRAM(s) Oral daily  Nephro-elicia 1 Tablet(s) Oral daily  pantoprazole  Injectable 40 milliGRAM(s) IV Push daily  sodium chloride 0.65% Nasal 1 Spray(s) Both Nostrils every 12 hours  sodium chloride 0.9%. 1000 milliLiter(s) (10 mL/Hr) IV Continuous <Continuous>  sodium chloride 3%  Inhalation 4 milliLiter(s) Inhalation every 6 hours  traZODone 100 milliGRAM(s) Oral at bedtime  vancomycin  IVPB      vancomycin  IVPB 1000 milliGRAM(s) IV Intermittent every 24 hours      Vital Signs Last 24 Hrs  T(C): 36.9 (01-23-25 @ 04:00), Max: 36.9 (01-22-25 @ 15:10)  T(F): 98.4 (01-23-25 @ 04:00), Max: 98.5 (01-22-25 @ 15:10)  HR: 75 (01-23-25 @ 08:35) (57 - 82)  BP: 111/44 (01-23-25 @ 07:00) (105/59 - 205/79)  BP(mean): 64 (01-23-25 @ 07:00) (64 - 114)  RR: 22 (01-23-25 @ 07:00) (10 - 31)  SpO2: 100% (01-23-25 @ 08:35) (91% - 100%)    Physical Exam:    Constitutional   pt awake and alert     HEENT PERRLA EOMI,No pallor or icterus    Neck trach     Chest Good AE,CTA    CVS  S1 S2   Abd soft BS normal No tenderness     Ext No cyanosis clubbing or edema    IV site no erythema tenderness or discharge    Joints no swelling or LOM    CNS AAO X 3 no focal    Lab Data:                          9.7    9.33  )-----------( 314      ( 23 Jan 2025 00:33 )             30.4       01-23    134[L]  |  97  |  30[H]  ----------------------------<  109[H]  3.5   |  24  |  4.69[H]    Ca    8.9      23 Jan 2025 00:33  Phos  2.4     01-23  Mg     2.2     01-23    TPro  5.8[L]  /  Alb  2.6[L]  /  TBili  0.6  /  DBili  x   /  AST  22  /  ALT  9[L]  /  AlkPhos  117  01-23              .Blood BLOOD  01-22-25   Growth in anaerobic bottle: Gram Positive Cocci in Pairs and Chains  Direct identification is available within approximately 3-5  hours either by Blood Panel Multiplexed PCR or Direct  MALDI-TOF. Details: [email or web address]  --    Growth in anaerobic bottle: Gram Positive Cocci in Pairs and Chains  -  Enterococcus faecium,VRE: Detec (01.22.25 @ 12:20)        Bronchial  01-20-25   Commensal manjit consistent with body site  --    Moderate polymorphonuclear leukocytes per low power field  Rare Squamous epithelial cells per low power field  No organisms seen per oil power field      Bronchial  01-18-25   Commensal manjit consistent with body site  --    Few polymorphonuclear leukocytes seen per low power field  No Squamous epithelial cells seen per low power field  No organisms seen per oil power field      WBC Count: 9.33 (01-23-25 @ 00:33)  WBC Count: 9.24 (01-22-25 @ 00:11)  WBC Count: 9.50 (01-21-25 @ 01:52)  WBC Count: 9.76 (01-21-25 @ 01:23)  WBC Count: 10.43 (01-20-25 @ 00:29)  WBC Count: 9.52 (01-19-25 @ 00:21)  WBC Count: 9.61 (01-18-25 @ 00:51)  WBC Count: 11.50 (01-17-25 @ 00:33)       Bilirubin Total: 0.6 mg/dL (01-23-25 @ 00:33)  Aspartate Aminotransferase (AST/SGOT): 22 U/L (01-23-25 @ 00:33)  Alanine Aminotransferase (ALT/SGPT): 9 U/L (01-23-25 @ 00:33)  Alkaline Phosphatase: 117 U/L (01-23-25 @ 00:33)    Bilirubin Total: 0.5 mg/dL (01-22-25 @ 00:11)  Aspartate Aminotransferase (AST/SGOT): 16 U/L (01-22-25 @ 00:11)  Alanine Aminotransferase (ALT/SGPT): 9 U/L (01-22-25 @ 00:11)  Alkaline Phosphatase: 88 U/L (01-22-25 @ 00:11)

## 2025-01-23 NOTE — PROGRESS NOTE ADULT - ASSESSMENT
54M s/p CABG x 3 on 12/30/24 > postop c/b acute respiratory failure 2/2 E coli bacteremia 2/2 pneumonia, s/p trach creation 1/16. ACS re-consulted for sacral decubitus wound.    Plan:  - No likely surgical intervention at this time  - C/w wound care recommendations  - appreciate continued care per CTICU     ACS/Trauma Surgery  [phone].

## 2025-01-23 NOTE — PROGRESS NOTE ADULT - SUBJECTIVE AND OBJECTIVE BOX
[facility]    Dr. Nica Styles     Office [phone] (9 am to 5 pm)  Service: [phone] (5pm to 9am)  Also Available on TEAMS      RENAL PROGRESS NOTE: DATE OF SERVICE 01-23-25 @ 10:54    Patient is a 54y old  Male who presents with a chief complaint of s/p CABG x 3 (23 Jan 2025 07:06)      Patient seen and examined at bedside. No chest pain/sob    VITALS:  T(F): 98.5 (01-23-25 @ 08:00), Max: 98.5 (01-22-25 @ 15:10)  HR: 80 (01-23-25 @ 09:00)  BP: 115/49 (01-23-25 @ 08:00)  RR: 17 (01-23-25 @ 09:00)  SpO2: 100% (01-23-25 @ 09:00)  Wt(kg): --    01-22 @ 07:01 - 01-23 @ 07:00  --------------------------------------------------------  IN: 1920 mL / OUT: 2000 mL / NET: -80 mL    01-23 @ 07:01 - 01-23 @ 10:54  --------------------------------------------------------  IN: 55 mL / OUT: 0 mL / NET: 55 mL          PHYSICAL EXAM:  Constitutional: NAD  Neck: No JVD  Respiratory: CTAB, no wheezes, rales or rhonchi  Cardiovascular: S1, S2, RRR  Gastrointestinal: BS+, soft, NT/ND  Extremities: No peripheral edema    Hospital Medications:   MEDICATIONS  (STANDING):  albuterol    0.083% 2.5 milliGRAM(s) Nebulizer every 6 hours  aMIOdarone    Tablet   Oral   aMIOdarone    Tablet 200 milliGRAM(s) Oral daily  ascorbic acid 500 milliGRAM(s) Oral daily  aspirin  chewable 81 milliGRAM(s) Oral daily  atorvastatin 80 milliGRAM(s) Oral at bedtime  bisacodyl Suppository 10 milliGRAM(s) Rectal once  chlorhexidine 0.12% Liquid 15 milliLiter(s) Oral Mucosa every 12 hours  chlorhexidine 2% Cloths 1 Application(s) Topical daily  chlorhexidine 4% Liquid 1 Application(s) Topical <User Schedule>  dextrose 50% Injectable 50 milliLiter(s) IV Push every 15 minutes  epoetin ignacia (EPOGEN) Injectable 26070 Unit(s) IV Push <User Schedule>  gabapentin 100 milliGRAM(s) Oral every 12 hours  heparin   Injectable 5000 Unit(s) SubCutaneous every 8 hours  insulin lispro (ADMELOG) corrective regimen sliding scale   SubCutaneous every 6 hours  linezolid  IVPB      linezolid  IVPB 600 milliGRAM(s) IV Intermittent once  linezolid  IVPB 600 milliGRAM(s) IV Intermittent every 12 hours  meropenem  IVPB 500 milliGRAM(s) IV Intermittent every 24 hours  meropenem  IVPB      methocarbamol 500 milliGRAM(s) Oral every 8 hours  mirtazapine 7.5 milliGRAM(s) Oral daily  Nephro-elicia 1 Tablet(s) Oral daily  pantoprazole  Injectable 40 milliGRAM(s) IV Push daily  sodium chloride 0.65% Nasal 1 Spray(s) Both Nostrils every 12 hours  sodium chloride 0.9%. 1000 milliLiter(s) (10 mL/Hr) IV Continuous <Continuous>  sodium chloride 3%  Inhalation 4 milliLiter(s) Inhalation every 6 hours  traZODone 100 milliGRAM(s) Oral at bedtime      LABS:  01-23    134[L]  |  97  |  30[H]  ----------------------------<  109[H]  3.5   |  24  |  4.69[H]    Ca    8.9      23 Jan 2025 00:33  Phos  2.4     01-23  Mg     2.2     01-23    TPro  5.8[L]  /  Alb  2.6[L]  /  TBili  0.6  /  DBili      /  AST  22  /  ALT  9[L]  /  AlkPhos  117  01-23    Creatinine Trend: 4.69 <--, 5.89 <--, 4.86 <--, 4.80 <--, 6.64 <--, 5.37 <--, 4.00 <--, 5.09 <--    Albumin: 2.6 g/dL (01-23 @ 00:33)  Phosphorus: 2.4 mg/dL (01-23 @ 00:33)                              9.7    9.33  )-----------( 314      ( 23 Jan 2025 00:33 )             30.4     Urine Studies:  Urinalysis - [01-23-25 @ 00:33]      Color  / Appearance  / SG  / pH       Gluc 109 / Ketone   / Bili  / Urobili        Blood  / Protein  / Leuk Est  / Nitrite       RBC  / WBC  / Hyaline  / Gran  / Sq Epi  / Non Sq Epi  / Bacteria       Iron 29, TIBC 143, %sat 20      [12-19-24 @ 05:00]  Ferritin 904      [12-19-24 @ 05:00]  TSH 4.75      [12-24-24 @ 06:50]        RADIOLOGY & ADDITIONAL STUDIES:

## 2025-01-23 NOTE — PROGRESS NOTE ADULT - ASSESSMENT
54M with PMH of HTN, HLD, ESRD on HD MWF via LUE AVF, ascites (requiring repeat paracenteses q4-6wks), NICM with moderate LV dysfunction w/ EF 35-40%() and CAD s/p atherectomy + SALLIE to D1(2024) presents to [facility] transferred from [facility]. Patient initially presented to [facility] ED with shortness of breath. Of note he was recently admitted from - for acute cholecystitis s/p cholecystectomy. In [facility] ED, pt was hypoxic to 86% on RA, trop 1.7K, EKG no new changes, CXR fluid overload. Admitted for AHRF 2/2 fluid overload. Pt received HD on , ,  and . DAPT was resumed, TTE showed improvement in LVEF to 40-45%. Stress test was abnormal with 1mm inferior STD, reversible ischemia in the inferior wall and fixed defects in the lateral, anterior and apical walls with post stress EF of 24%. Pt was then transferred to [facility] for cardiac cath which showed pLAD 70% ISR, mLCx 70%, D1 severe dz. Patient now transferred to [facility] CTS Dr. Rivers for CABG eval. Patient denies any current SOB or chest pain on admission. Otherwise denies headaches, abdominal pain, urinary or bowel changes, fevers, chills, N/V/D, sick contacts.  (24 Dec 2024 05:07)   VSS  CP free   HD via avf  for daily HD pre op- on lovenox 30 qd    HD today continue with present meds. Plan for CABG possibleTVR next week   vss; rsr 55-80; hd today w/ 1 unit prbc; pt to be dialzyed also this  w/ another 1 unit prbc   VSS; RSR 60-80; continue asa/bb/ statin; HD in am - transfuse 1 unit prbc with HD; d/c lovenox after am dose sun    - Pt underwent  C3L free LIMA-LAD; SVG-Diag; SVG-leftPL  Pt given cefuroxime perioperatively   pt extubated   . pt with hypotension and ECHO showing Systolic HF/depressed BIV Function, currently supported with /pressors.  Labs this evening showing transaminitis  1/2 -3 pt hypotensive, febrile and reintubated  Pt pancultured. and started on zosyn-> meropenem and gent  pt found to have Gram negative bacteremia    E coli +ESBL bacteremia    : Afebrile, on dobutmaine but not on pressors, WBC normal. No new culture data. Bleeding around tracheostomy site. Role of antibiotics (IV and nebulized) at this point not clear. CXR   antibiotics d/gladys -- ( january 3-- )   - sacral wound inspected with team - wound care and plastics evaluation    Recommendations  -  team  restarted abs with concern for possible abscess. For plastics input and wound care. For CT to see depth of wound, look for collection, etc..  - positve blood cultures - team added zyvox , please d/w pharmacy as pt is on trazadone- can use Daptomycin as an alternative    - pulmonary toilet- ? pulmonary input . ? bronchomalacia  -pt now with trach-  - local care for sacral lesion, await CT   - ascites improved with dialysis and improved cardiac function  - continue to closely trend WBC and temperature  - await sputum culture from bronchoscopy      Marla Champion M.D. ,   please reach via teams   If no answer, or after 5PM/ weekends,  then please call  [phone]    Assessment and plan discussed with the CTU team     I will be away as of tomorrow. My associates will be covering. Please call [phone] with acute issues, questions.

## 2025-01-23 NOTE — PROGRESS NOTE ADULT - ASSESSMENT
54M with PMH of HTN, HLD, ESRD on HD MWF via LUE AVF, ascites (requiring repeat paracenteses q4-6wks), NICM with moderate LV dysfunction w/ EF 35-40%(2023) and CAD s/p atherectomy + SALLIE to D1(4/11/2024, now s/p CABG x 3 on 12/30/24 w/ post op course complicated by reintubation for hypoxia found to have ESBL pneumonia, collapsed left lung, s/p IABP insertion for septic shock due to E coli, s/p multiple bronch and tracheostomy tube placement found to have a sacral pressure wound. No appreciable perirectal abscess on exam. Plastic surgery and wound care evaluating sacral wound.    Recommendations:  - No acute surgical intervention  - Awaiting CT A/P to r/o deeper abscess  - Care per CTICU    [id number]  54M with PMH of HTN, HLD, ESRD on HD MWF via LUE AVF, ascites (requiring repeat paracenteses q4-6wks), NICM with moderate LV dysfunction w/ EF 35-40%(2023) and CAD s/p atherectomy + SALLIE to D1(4/11/2024, now s/p CABG x 3 on 12/30/24 w/ post op course complicated by reintubation for hypoxia found to have ESBL pneumonia, collapsed left lung, s/p IABP insertion for septic shock due to E coli, s/p multiple bronch and tracheostomy tube placement found to have a sacral pressure wound. No appreciable perirectal abscess on exam. Plastic surgery and wound care evaluating sacral wound.    Recommendations:  - No acute surgical intervention  - Awaiting CT A/P to r/o deeper abscess  - Care per CTICU    [id number]    Addendum: 1/23; 5 PM    CT reviewed - No suggestion of perirectal abscess on the scan. Surgery will sign off presently, kindly re-consult if clinical picture changes.     [id number]

## 2025-01-23 NOTE — PROGRESS NOTE ADULT - ASSESSMENT
54M with PMH of HTN, HLD, ESRD on HD MWF via LUE AVF, ascites (requiring repeat paracenteses q4-6wks), NICM with moderate LV dysfunction w/ EF 35-40%() and CAD s/p atherectomy + SALLIE to D1(2024) presents to [facility] transferred from [facility]. Patient initially presented to [facility] ED with shortness of breath. Of note he was recently admitted from - for acute cholecystitis s/p cholecystectomy. In [facility] ED, pt was hypoxic to 86% on RA, trop 1.7K, EKG no new changes, CXR fluid overload. Admitted for AHRF 2/2 fluid overload. Pt received HD on , ,  and . DAPT was resumed, TTE showed improvement in LVEF to 40-45%. Stress test was abnormal with 1mm inferior STD, reversible ischemia in the inferior wall and fixed defects in the lateral, anterior and apical walls with post stress EF of 24%.     Pt was then transferred to [facility] for cardiac cath which showed pLAD 70% ISR, mLCx 70%, D1 severe dz.     Patient now transferred to [facility] CTS Dr. Rivers for CABG eval.# CAD s/p NSTEMI  Hx CAD s/p PCI LAD   Presented with ADHF elevated troponins  Positive NST1-Cath- pLAD 70% ISR, mLCx 70%, D1 severe dz.   TTE EF 35% Severe TRWas on Plavix-p2y12- 321 been off Plavix since -POD#1-C3L free LIMA-LAD; SVG-Diag; KOD-gbqkSZ-Lsxiasefq on  Sinus rhythm,Amio for AF prophylaxis  -OOB off  Sinus rhythm   -POD#3-On /pressors-EF 25-30% RV failure with transaminitis-Sinus Zqtsqd16/03-POD#4-Was intubated for hypoxia-gradual hypotension on /pressors had IABP inserted this morning  -POD#5-s/p IABP insertion, sepsis/septic shock due to GNR/ECOLI HD cath placed   Bronched yesterday 1/3 - minimal secretions with rust-tinged sputum   ESBL-E Coli bacteremia on meropenam    - POD#7 Atrial Fib ,remains intubated weaning IABP 1:3,off pressors on CRRT  -IABP removed.Remains on vent-Alex 10ppm,off Levo- CVP 10 / MAP 71 / Hct 25.6% / Lactate 1.7-Atrial Fibrillation  -Intubated on Alex 10ppm, gtt, BP better-AF~~90's on Amio-had bronch still febrile Tmax 68816/09-Extubated awake alert on HFNC AF,on Dobutrex-CRRT,Cultures no growth    01/10-OOB on BiPAP Sinus Rhythm on -SR/ MAP 57/ CVP 10/  Hct 26.7 / Lact 1.4  -s/p EDU/DCCV-Sinus rhythm on -SR / MAP 52 / CVP 12/ Hct 25.8 / Lact 0.7-had bronch with BAL Left lung  -Sinus rhythm on -for repeat Bronch-SR / MAP 67 / CVP 11 / Hct 27.4% / Lact 0.8  -s/p Trach on  TTE EF 55%-SR / CVP 2 / MAP 63 / Hct 25.9% / Lactate 0.9  -Awake on Trach collar off  c/o buttock pain had bronch yesterday-BAL-Sinus rhythm  -Sinus rhythm on vent at night-BP stable may need to increase hydrallazine 25 mg q8      # Acute on Chronic Systolic Heart Failure now improved  EF-35% ,severe TR  Had HD post op  GDMT post op  LVEF improved post bypass but now at 23-30%-BiV dysfunction on  now off pressors  -TTE EF 40%,trace effusion  ECG c/w pericarditis-was on colchicine   01/15-TTE EF 55%    Vascular evaluated  AVGraft /?steal causing RV failure-'' Very low suspicion of steal Sd and AVF causing current clinical state. ''   VA Duplex Hemodialysis Access, Left (25 @ 13:35) >   Arterial flow volume of 1301 mL/m, and the venous flow volume of  1492 mL/m are consistent with a normally functioning dialysis access  fistula.      # Ascites  Hx chronic ascites  Has drainage q4-6 weeks  Last drained -4600mL  ? ascites related to severe TR  Had agnbuwzdqdgy-Tboyrca-hl growth   CT Abdomen 01/10-Large volume ascites-consider paracentesis  Monitor      # ESRD  Now off CRRT transition to HD     # Sacral Decubitus  Seen by Plastics and Gen Surgery

## 2025-01-23 NOTE — PROGRESS NOTE ADULT - SUBJECTIVE AND OBJECTIVE BOX
General Surgery Progress Note    54M s/p CABG x 3 on 12/30/24, postop course c/b acute respiratory failure 2/2 E coli bacteremia 2/2 pneumonia. ACS re-consulted for sacral decubitus ulcer.    Subjective: Patient endorsing throbbing pain from sacral wound.    Objective:    Vital Signs Last 24 Hrs  T(C): 36.7 (23 Jan 2025 12:00), Max: 36.9 (23 Jan 2025 04:00)  T(F): 98.1 (23 Jan 2025 12:00), Max: 98.5 (23 Jan 2025 08:00)  HR: 87 (23 Jan 2025 17:41) (57 - 89)  BP: 129/60 (23 Jan 2025 14:00) (111/44 - 205/79)  BP(mean): 87 (23 Jan 2025 14:00) (64 - 114)  RR: 22 (23 Jan 2025 14:00) (14 - 28)  SpO2: 100% (23 Jan 2025 17:41) (99% - 100%)    Parameters below as of 23 Jan 2025 17:41  Patient On (Oxygen Delivery Method): tracheostomy collar      I&O's Detail    22 Jan 2025 07:01  -  23 Jan 2025 07:00  --------------------------------------------------------  IN:    Enteral Tube Flush: 50 mL    IV PiggyBack: 550 mL    Nepro with Carb Steady: 1200 mL    sodium chloride 0.9%: 120 mL  Total IN: 1920 mL    OUT:    Other (mL): 2000 mL  Total OUT: 2000 mL    Total NET: -80 mL      23 Jan 2025 07:01  -  23 Jan 2025 17:50  --------------------------------------------------------  IN:    Enteral Tube Flush: 150 mL    Nepro with Carb Steady: 350 mL    sodium chloride 0.9%: 5 mL  Total IN: 505 mL    OUT:  Total OUT: 0 mL    Total NET: 505 mL      Physical Exam:  General: AAOx3, uncomfortable appearing  Back: ~10 cm x 5 cm superficial sacral wound, wound bed pink and dry w/o discharge, central fibrinous sloughing. No purulent output      Labs:                           9.7    9.33  )-----------( 314      ( 23 Jan 2025 00:33 )             30.4       01-23    134[L]  |  97  |  30[H]  ----------------------------<  109[H]  3.5   |  24  |  4.69[H]    Ca    8.9      23 Jan 2025 00:33  Phos  2.4     01-23  Mg     2.2     01-23    TPro  5.8[L]  /  Alb  2.6[L]  /  TBili  0.6  /  DBili  x   /  AST  22  /  ALT  9[L]  /  AlkPhos  117  01-23        Radiology:    < from: CT Abdomen and Pelvis w/ IV Cont (01.23.25 @ 12:34) >  ABDOMINAL WALL: Postsurgical changes in the anterior abdominal wall with   mesh. Diffuse subcutaneous edema. Nonspecific subcutaneous infiltration   overlying the lower sacrum and coccyx on a background of anasarca.   Questionableill-defined or developing collection versus confluent edema   located posterior and inferior to the coccyx, measures approximately 2.4   x 1.8 cm (3:315). No soft tissue gas.    < end of copied text >    < from: CT Abdomen and Pelvis w/ IV Cont (01.23.25 @ 12:34) >  ABDOMEN AND PELVIS:  *  Moderate to large ascites, mildly decreased since 1/10/2025.   Ill-defined infiltration within the mesentery and omentum with questioned   pelvic peritoneal nodule. Suggest correlation with fluid sampling.  *  Nonspecific subcutaneous infiltration overlying the lower sacrum and   coccyx on a background of anasarca. Ill-defined fluid located posterior   and inferior to the coccyx measuring approximately 2.4 x 1.8 cm, which   may represent confluent edema versus phlegmon or developing collection.  *  Cystic structure in the gallbladder fossa measuring 2.0 cm, possibly   representing a gallbladder remnant or residual cystic duct, with a small   collection not excluded.    < end of copied text >

## 2025-01-23 NOTE — CHART NOTE - NSCHARTNOTEFT_GEN_A_CORE
NUTRITION FOLLOW UP NOTE    PATIENT SEEN FOR: ICU follow up    SOURCE: [x] Patient  [x] Current Medical Record  [] RN  [] Family/support person at bedside  [] Patient unavailable/inappropriate  [] Other:  -Note patient communicates via white board in setting of trach    CHART REVIEWED/EVENTS NOTED.  [] No changes to nutrition care plan to note  [x] Nutrition Status:  -s/p CABG 24  -septic shock  -Hx ESRD on HD, receiving HD sessions in-house  -emergently reintubated 1/15. s/p tracheostomy   -Awaiting CT A/P to r/o deeper abscess at sacral wound. Seen by Wound Care, Plastic Surgery, and Colorectal Surgery    DIET ORDER:   Diet, NPO with Tube Feed:   Tube Feeding Modality: Nasogastric  Nepro with Carb Steady (NEPRORTH)  Total Volume for 24 Hours (mL): 1200  Continuous  Starting Tube Feed Rate {mL per Hour}: 50  Until Goal Tube Feed Rate (mL per Hour): 50  Tube Feed Duration (in Hours): 24  Tube Feed Start Time: 13:00 (25 @ 13:58) [Active]    CURRENT DIET ORDER IS:  [] Appropriate:  [] Inadequate:  [] Other:    NUTRITION INTAKE/PROVISION:  [] PO:  [x] Enteral Nutrition:  EN Order Provides: 200ml formula, 2250kcal, 112g protein, 872ml free water. Meets 28.8kcal/kg and 1.44g/kg protein based on IBW 172lb/78kg.    Current Pump Rate: 50ml/hr  5-Day Average Enteral Provision per RN flowsheet: 1014 mL, 1825 kcals, 82 g protein   [] Parenteral Nutrition:    ANTHROPOMETRICS:  Drug Dosing Weight  Height (cm): 180.3 (30 Dec 2024 12:01)  Weight (kg): 85.1 (30 Dec 2024 12:01)  BMI (kg/m2): 26.2 (30 Dec 2024 12:01)  Weights:   Daily Weight in k.6 (), 81.6 (), Weight in k.7 (), 80 (), 88 (), 83.4 (), 77.7 (), 82 (01-15), 81 (), 81 (), 88.7 (), 83.3 (), 97.6 (01-10), 79.6 (), 93.4 (), 87.9 (), 85 (), 86.2 (), 90 (), 84 (), 77.5 (), 87.8 (), 88.3 (, standing), 85.3 (, standing), 85 (, standing), 79.7 (), 80.9 (, standing), 81.3 (, standing), 82.6 ().  Weight fluctuations likely in setting of fluid shifts, scale inaccuracies, and/or prior Inadequate energy intake.    NUTRITIONALLY PERTINENT MEDICATIONS:  MEDICATIONS  (STANDING):  aMIOdarone    Tablet  aMIOdarone    Tablet  ascorbic acid  atorvastatin  bisacodyl Suppository  dextrose 50% Injectable  insulin lispro (ADMELOG) corrective regimen sliding scale  linezolid  IVPB  linezolid  IVPB  linezolid  IVPB  meropenem  IVPB  meropenem  IVPB  Nephro-elicia  pantoprazole  Injectable  sodium chloride 0.9%.       NUTRITIONALLY PERTINENT LABS:   Na134 mmol/L[L] Glu 109 mg/dL[H] K+ 3.5 mmol/L Cr  4.69 mg/dL[H] BUN 30 mg/dL[H]  Phos 2.4 mg/dL[L]  Alb 2.6 g/dL[L] ALT 9 U/L[L] AST 22 U/L Alkaline Phosphatase 117 U/L    A1C with Estimated Average Glucose Result: 5.6 % (24 @ 06:50)          Finger Sticks:  POCT Blood Glucose.: 118 mg/dL ( @ 05:06)  POCT Blood Glucose.: 99 mg/dL ( @ 17:14)  POCT Blood Glucose.: 117 mg/dL ( @ 13:42)      NUTRITIONALLY PERTINENT MEDICATIONS/LABS:  [x] Reviewed  [x] Relevant notes on medications/labs:  -sliding scale insulin for glycemic control    EDEMA:  [x] Reviewed  [] Relevant notes:    GI/ I&O:  [x] Reviewed  [x] Relevant notes: Patient reports no current nausea or diarrhea. Last BM today loose per flowsheets   [] Other:    SKIN:   [] No pressure injuries documented, per nursing flowsheet  [] Pressure injury previously noted  [x] Change in pressure injury documentation:  -Per Wound Care :  "Sacral/buttocks wound 2/2 skin failure, Rt upper back injury now resolved"  (previously noted as "Sacral region/Bilateral buttocks deep tissue injury, Right upper back deep tissue injury" per Wound Care )  [x] Other: midsternal surgical incision from CABG     ESTIMATED NEEDS:  [] No change:  [x] Updated:  Energy:  2562-9046 kcal/day (30-35 kcal/kg)  Protein:  109-140 g/day (1.4-1.8 g/kg)  Fluid:   ml/day or [x] defer to team  Based on: IBW 172lb/78kg with consideration for HD, skin integrity     NUTRITION DIAGNOSIS:  [x] Prior Dx: Inadequate energy intake, Increased Nutrient Needs (protein-energy, micronutrients)  [] New Dx:    EDUCATION:  [] Yes:  [x] Not appropriate/warranted    NUTRITION CARE PLAN:  1. Diet: Continue Nepro tube feeding as tolerated. Increase goal rate to 60ml/hr x 24hr. At goal provides 1440ml, 2592kcal, 117g protein, 1047ml free water; meets 33.2kcal/kg and 1.5g/kg protein based on IBW 172lb/78kg. Defer free water flushes to medical team.   2. Multivitamin/mineral supplementation: Continue nephro-elicia for wound healing pending no medical contraindications. Defer Vitamin C to medical team as patient is receiving HD.    [] Achieved - Continue current nutrition intervention(s)  [] Current medical condition precludes nutrition intervention at this time.    MONITORING AND EVALUATION:   RD remains available upon request and will follow up per protocol.    Corinne Lima RDN, CDN - available via [place] TEAMS

## 2025-01-23 NOTE — PROGRESS NOTE ADULT - SUBJECTIVE AND OBJECTIVE BOX
MR#[id number]  PATIENT NAME:RAAD CANO    DATE OF SERVICE: 25 @ 06:44  Patient was seen and examined by Solo Quick MD on    25 @ 06:44 .  Interim events noted.Consultant notes ,Labs,Telemetry reviewed by me       HOSPITAL COURSE: HPI:  54M with PMH of HTN, HLD, ESRD on HD MWF via LUE AVF, ascites (requiring repeat paracenteses q4-6wks), NICM with moderate LV dysfunction w/ EF 35-40%() and CAD s/p atherectomy + SALLIE to D1(2024) presents to [facility] transferred from [facility]. Patient initially presented to [facility] ED with shortness of breath. Of note he was recently admitted from - for acute cholecystitis s/p cholecystectomy. In [facility] ED, pt was hypoxic to 86% on RA, trop 1.7K, EKG no new changes, CXR fluid overload. Admitted for AHRF 2/2 fluid overload. Pt received HD on , ,  and . DAPT was resumed, TTE showed improvement in LVEF to 40-45%. Stress test was abnormal with 1mm inferior STD, reversible ischemia in the inferior wall and fixed defects in the lateral, anterior and apical walls with post stress EF of 24%. Pt was then transferred to [facility] for cardiac cath which showed pLAD 70% ISR, mLCx 70%, D1 severe dz. Patient now transferred to [facility] CTS Dr. Rivers for CABG eval. Patient denies any current SOB or chest pain on admission. Otherwise denies headaches, abdominal pain, urinary or bowel changes, fevers, chills, N/V/D, sick contacts.  (24 Dec 2024 05:07)    INTERIM EVENTS:Patient seen at bedside ,interim events noted.   Cardiac cath  pLAD 70% ISR, mLCx 70%, D1 severe dz.   -POD#1-C3L free LIMA-LAD; SVG-Diag; SVG-leftPL Awake OOB extubated Sinus rhythm on Dobutamine gtt  - Was intubated last night for airway protection s/p bronchoscopy This morning had IABP inserted  remains sedated intubated Sinus Rhythm  - s/p IABP insertion, sepsis/septic shock due to GNR/ECOLI -Paracentesis for suspected bacterial peritonitis-no growth  -Was cardioverted yesterday remains in Sinus rhythm-had Bronch earlier L lung mucus-on  On HFNC-40%/30L Trial HD  -OOB remains on HFNC for repeat bronch today-Sinus rhythm On /Vasopressin gtt   -OOB NGT,less dyspneac on  gtt  -s/p Trach-vented on -SR / CVP 2 / MAP 63 / Hct 25.9% / Lactate 0.9  -Awake on Trach collar off  c/o buttock pain had bronch yesterday-BAL-Sinus rhythm  -Awake on nocturnal vent sopport-Sinus rhythm-had been seen by Surgery for sacral wound      PMH -reviewed admission note, no change since admission      MEDICATIONS  (STANDING):  albuterol    0.083% 2.5 milliGRAM(s) Nebulizer every 6 hours  aMIOdarone    Tablet   Oral   aMIOdarone    Tablet 200 milliGRAM(s) Oral daily  ascorbic acid 500 milliGRAM(s) Oral daily  aspirin  chewable 81 milliGRAM(s) Oral daily  atorvastatin 80 milliGRAM(s) Oral at bedtime  bisacodyl Suppository 10 milliGRAM(s) Rectal once  chlorhexidine 0.12% Liquid 15 milliLiter(s) Oral Mucosa every 12 hours  chlorhexidine 2% Cloths 1 Application(s) Topical daily  chlorhexidine 4% Liquid 1 Application(s) Topical <User Schedule>  dextrose 50% Injectable 50 milliLiter(s) IV Push every 15 minutes  epoetin ignacia (EPOGEN) Injectable 24476 Unit(s) IV Push <User Schedule>  gabapentin 100 milliGRAM(s) Oral every 12 hours  heparin   Injectable 5000 Unit(s) SubCutaneous every 8 hours  hydrALAZINE 10 milliGRAM(s) Oral every 8 hours  insulin lispro (ADMELOG) corrective regimen sliding scale   SubCutaneous every 6 hours  meropenem  IVPB 500 milliGRAM(s) IV Intermittent every 24 hours  meropenem  IVPB      methocarbamol 500 milliGRAM(s) Oral every 8 hours  mirtazapine 7.5 milliGRAM(s) Oral daily  Nephro-elicia 1 Tablet(s) Oral daily  pantoprazole  Injectable 40 milliGRAM(s) IV Push daily  sodium chloride 0.65% Nasal 1 Spray(s) Both Nostrils every 12 hours  sodium chloride 0.9%. 1000 milliLiter(s) (10 mL/Hr) IV Continuous <Continuous>  sodium chloride 3%  Inhalation 4 milliLiter(s) Inhalation every 6 hours  traZODone 100 milliGRAM(s) Oral at bedtime  vancomycin  IVPB      vancomycin  IVPB 1000 milliGRAM(s) IV Intermittent every 24 hours    MEDICATIONS  (PRN):  acetaminophen     Tablet .. 650 milliGRAM(s) Oral every 6 hours PRN Mild Pain (1 - 3)  benzocaine 20% Spray 1 Spray(s) Topical every 6 hours PRN throat pain  lidocaine/prilocaine Cream 1 Application(s) Topical daily PRN pre-HD  sodium chloride 0.9% lock flush 10 milliLiter(s) IV Push every 1 hour PRN Pre/post blood products, medications, blood draw, and to maintain line patency            REVIEW OF SYSTEMS:  Constitutional: [ ] fever, [ ]weight loss,  [ x]fatigue [ ]weight gain  Eyes: [ ] visual changes  Respiratory: [ ]shortness of breath;  [ ] cough, [ ]wheezing, [ ]chills, [ ]hemoptysis  Cardiovascular: [ ] chest pain, [ ]palpitations, [ ]dizziness,  [ ]leg swelling[ ]orthopnea[ ]PND  Gastrointestinal: [ ] abdominal pain, [ ]nausea, [ ]vomiting,  [ ]diarrhea [ ]Constipation [ ]Melena  Genitourinary: [ ] dysuria, [ ] hematuria [ ]Vigil  Neurologic: [ ] headaches [ ] tremors[ ]weakness [ ]Paralysis Right[ ] Left[ ]  Skin: [ ] itching, [ ]burning, [ ] rashes  Endocrine: [ ] heat or cold intolerance  Musculoskeletal: [ ] joint pain or swelling; [x ] muscle, back, or extremity pain  Psychiatric: [ ] depression, [ ]anxiety, [ ]mood swings, or [ ]difficulty sleeping  Hematologic: [ ] easy bruising, [ ] bleeding gums    [ ] All remaining systems negative except as per above.   [ ]Unable to obtain.  [x] No change in ROS since admission      Vital Signs Last 24 Hrs  T(C): 36.9 (2025 04:00), Max: 36.9 (2025 15:10)  T(F): 98.4 (2025 04:00), Max: 98.5 (2025 15:10)  HR: 63 (2025 06:00) (57 - 82)  BP: 155/63 (2025 06:00) (105/59 - 205/79)  BP(mean): 90 (2025 06:00) (70 - 114)  RR: 22 (2025 06:00) (10 - 31)  SpO2: 100% (2025 06:00) (91% - 100%)    Parameters below as of 2025 05:20  Patient On (Oxygen Delivery Method): ventilator      I&O's Summary    2025 07:01  -  2025 07:00  --------------------------------------------------------  IN: 1275 mL / OUT: 0 mL / NET: 1275 mL    2025 07:01  -  2025 06:44  --------------------------------------------------------  IN: 1765 mL / OUT: 2000 mL / NET: -235 mL        PHYSICAL EXAM:  General: No acute distress BMI-  HEENT: EOMI, PERRL  Neck: Supple, [ ] JVD[x] Trach  Lungs: Equal air entry bilaterally; [ ] rales [ ] wheezing [ ] rhonchi  Heart: Regular rate and rhythm; [x ] murmur   2/6 [ x] systolic [ ] diastolic [ ] radiation[ ] rubs [ ]  gallops  Abdomen: Nontender, bowel sounds present  Extremities: No clubbing, cyanosis, [ ] edema [ ]Pulses  equal and intact  Nervous system:  Alert & Oriented X3, no focal deficits  Psychiatric: Normal affect  Skin: No rashes or lesions    LABS:      134[L]  |  97  |  30[H]  ----------------------------<  109[H]  3.5   |  24  |  4.69[H]    Ca    8.9      2025 00:33  Phos  2.4       Mg     2.2         TPro  5.8[L]  /  Alb  2.6[L]  /  TBili  0.6  /  DBili  x   /  AST  22  /  ALT  9[L]  /  AlkPhos  117      Creatinine Trend: 4.69<--, 5.89<--, 4.86<--, 4.80<--, 6.64<--, 5.37<--                        9.7    9.33  )-----------( 314      ( 2025 00:33 )             30.4     PT/INR - ( 2025 00:33 )   PT: 15.8 sec;   INR: 1.39 ratio         PTT - ( 2025 00:33 )  PTT:33.5 sec       Xray Chest 1 View- PORTABLE-Urgent (Xray Chest 1 View- PORTABLE-Urgent .) (25 @ 13:36) >  FINDINGS:    2025, 6:21 AM  Tracheostomy. Right IJ CVC with tip overlying right atrium. Enteric tube  courses below the diaphragm with tip collimated out of view. Median  sternotomy wires.  Left upper lung surgical clips.  Mild pulmonary vascular congestion and small left effusion.  Cardiomegaly, stable.  No acute osseous abnormalities.    2025, 12:56 PM  Interval placement of left IJ catheter which terminates over the distal  left subclavian vein.    IMPRESSION:  Left IJ catheter terminates over the distal left subclavian vein.   Remaining support devices as described above.    Findings were discussed with JAX Marquez on 2025 at 2:25PM by Dr. Cuevas   with verbal confirmation.        TTE W or WO Ultrasound Enhancing Agent (25 @ 09:06) >  CONCLUSIONS:      1. Technically difficult image quality.   2. No pericardial effusion seen.     TTE W or WO Ultrasound Enhancing Agent (01.15.25 @ 13:06) >  CONCLUSIONS:      1. Left ventricular systolic function is normal with an ejection fraction visually estimated at 55 %.   2. Small pericardial effusion with evidence of hemodynamic compromise (or echocardiographic evidence of cardiac tamponade): respiratory variation across the mitral valve E wave is not greater than 30%.   3. Bilateral pleural effusion noted.      12 Lead ECG (01.10.25 @ 12:30) >   SINUS RHYTHM WITH 1ST DEGREE A-V BLOCK  LEFT AXIS DEVIATION  NON-SPECIFIC INTRA-VENTRICULAR CONDUCTION BLOCK  MINIMAL VOLTAGE CRITERIA FOR LVH, MAY BE NORMAL VARIANT ( [place] product )  ABNORMAL ECG

## 2025-01-23 NOTE — PROGRESS NOTE ADULT - SUBJECTIVE AND OBJECTIVE BOX
Patient seen and examined at the bedside.    Remains critically ill on continuous ICU monitoring, at risk for life threatening decompensation.  All Labs, data reviewed. Plan of care discussed in length during multi-disciplinary ICU rounds.       Brief Summary:  55 yo M with multiple medical problems including CAD s/p PCI in April 2024 s/p CABG x 3 on 12/30/24  1/2: Intubated for hypoxia & left lung white out s/p awake bronch with removal of copious mucopurulent secretions  1/4: s/p IABP insertion, sepsis/septic shock due to E coli   ESBL pneumonia, collapsed Left Lung, s/p multiple bronch and tracheostomy tube placement 1/18    24 Hour events:  Tolerates Bronch, BAL to follow  Continue PT  iHD two days ago, 1 U of PRBC  PO Vancomycin discontinued     Objective:  Vital Signs Last 24 Hrs  T(C): 36.9 (23 Jan 2025 04:00), Max: 36.9 (22 Jan 2025 15:10)  T(F): 98.4 (23 Jan 2025 04:00), Max: 98.5 (22 Jan 2025 15:10)  HR: 63 (23 Jan 2025 06:00) (57 - 82)  BP: 155/63 (23 Jan 2025 06:00) (105/59 - 205/79)  BP(mean): 90 (23 Jan 2025 06:00) (70 - 114)  RR: 22 (23 Jan 2025 06:00) (10 - 31)  SpO2: 100% (23 Jan 2025 06:00) (91% - 100%)    Parameters below as of 23 Jan 2025 05:20  Patient On (Oxygen Delivery Method): ventilator    Mode: AC/ CMV (Assist Control/ Continuous Mandatory Ventilation)  RR (machine): 22  FiO2: 40  PEEP: 10  ITime: 0.1  MAP: 17  PC: 16  PIP: 27    Physical Exam:  General: Alert and interactive,   Neurology: Oriented, following commands. ambulates  Respiratory: Breath sounds bilaterally, trach collar  CV: Sinus  Abdominal: Soft, Nontender  Extremities: Warm, well-perfused  Right arm tender, but stable  -------------------------------------------------------------------------------------------------------------------------------    Labs:                        9.7    9.33  )-----------( 314      ( 23 Jan 2025 00:33 )             30.4     01-23    134[L]  |  97  |  30[H]  ----------------------------<  109[H]  3.5   |  24  |  4.69[H]    Ca    8.9      23 Jan 2025 00:33  Phos  2.4     01-23  Mg     2.2     01-23    TPro  5.8[L]  /  Alb  2.6[L]  /  TBili  0.6  /  DBili  x   /  AST  22  /  ALT  9[L]  /  AlkPhos  117  01-23    LIVER FUNCTIONS - ( 23 Jan 2025 00:33 )  Alb: 2.6 g/dL / Pro: 5.8 g/dL / ALK PHOS: 117 U/L / ALT: 9 U/L / AST: 22 U/L / GGT: x           PT/INR - ( 23 Jan 2025 00:33 )   PT: 15.8 sec;   INR: 1.39 ratio       PTT - ( 23 Jan 2025 00:33 )  PTT:33.5 sec  ABG - ( 21 Jan 2025 23:46 )  pH, Arterial: 7.44  pH, Blood: x     /  pCO2: 43    /  pO2: 112   / HCO3: 29    / Base Excess: 4.5   /  SaO2: 99.2      ALL MEDICATIONS   MEDICATIONS  (STANDING):  acetaminophen   IVPB .. 1000 milliGRAM(s) IV Intermittent once  albuterol    0.083% 2.5 milliGRAM(s) Nebulizer every 6 hours  aMIOdarone    Tablet   Oral   aMIOdarone    Tablet 200 milliGRAM(s) Oral daily  ascorbic acid 500 milliGRAM(s) Oral daily  aspirin  chewable 81 milliGRAM(s) Oral daily  atorvastatin 80 milliGRAM(s) Oral at bedtime  bisacodyl Suppository 10 milliGRAM(s) Rectal once  chlorhexidine 0.12% Liquid 15 milliLiter(s) Oral Mucosa every 12 hours  chlorhexidine 2% Cloths 1 Application(s) Topical daily  chlorhexidine 4% Liquid 1 Application(s) Topical <User Schedule>  dextrose 50% Injectable 50 milliLiter(s) IV Push every 15 minutes  epoetin ignacia (EPOGEN) Injectable 82690 Unit(s) IV Push <User Schedule>  gabapentin 100 milliGRAM(s) Oral every 12 hours  heparin   Injectable 5000 Unit(s) SubCutaneous every 8 hours  hydrALAZINE 10 milliGRAM(s) Oral every 8 hours  HYDROmorphone  Injectable 0.5 milliGRAM(s) IV Push once  insulin lispro (ADMELOG) corrective regimen sliding scale   SubCutaneous every 6 hours  meropenem  IVPB 500 milliGRAM(s) IV Intermittent every 24 hours  meropenem  IVPB      methocarbamol 500 milliGRAM(s) Oral every 8 hours  mirtazapine 7.5 milliGRAM(s) Oral daily  Nephro-elicia 1 Tablet(s) Oral daily  pantoprazole  Injectable 40 milliGRAM(s) IV Push daily  sodium chloride 0.65% Nasal 1 Spray(s) Both Nostrils every 12 hours  sodium chloride 0.9%. 1000 milliLiter(s) (10 mL/Hr) IV Continuous <Continuous>  sodium chloride 3%  Inhalation 4 milliLiter(s) Inhalation every 6 hours  traZODone 100 milliGRAM(s) Oral at bedtime  vancomycin  IVPB      vancomycin  IVPB 1000 milliGRAM(s) IV Intermittent every 24 hours    MEDICATIONS  (PRN):  acetaminophen     Tablet .. 650 milliGRAM(s) Oral every 6 hours PRN Mild Pain (1 - 3)  benzocaine 20% Spray 1 Spray(s) Topical every 6 hours PRN throat pain  lidocaine/prilocaine Cream 1 Application(s) Topical daily PRN pre-HD  sodium chloride 0.9% lock flush 10 milliLiter(s) IV Push every 1 hour PRN Pre/post blood products, medications, blood draw, and to maintain line patency  ------------------------------------------------------------------------------------------------------------------------------  Assessment:  55 yo M s/p CABG x 3 on 12/30/24    Postop acute respiratory failure  Acute blood loss anemia  Ascites    ESRD on iHD   E coli bacteremia 2/2 pneumonia    Plan:  ***Neuro***  Postoperative acute pain control with Gabapentin and prns.  Trazodone nightly  PT ongoing    ***Cardiovascular***  s/p CABG x 3  A fib s/p cardioversion - Amiodarone daily.  Hydralazine for hypertension.  ASA / Statin daily    ***Pulmonary***  Postoperative acute respiratory failure  Tracheostomy 1/18   s/p Bronchoscopy 1/18. 1/20  Tolerates trach collar, keep on a vent at night  Start on 3 Nebs, albuterol    ***GI***  On Tube feeds at goal  Protonix for stress ulcer prophylaxis.   Ascites: Last paracentesis 1/11    ***Renal***  ESRD - last HD 1/17: -2.5L UF (MWF)  Nephro-Elicia for renal support    ***ID***  Sepsis/septic shock due to ESBL E. coli -  Meropenem. inhaled mary  Course completed  Trend leukocytosis   PO Vancomycin discontinued   Sacral wound - continue wound care, may benefit from a VAC    ***Endocrine***  Hyperglycemia - Insulin sliding scale    ***Hematology***  Acute blood loss anemia  CBC, coags  Continue  Epogen.  Heparin SQ for DVT        I, Lillie Kingsley MD, personally performed the services described in this documentation. All medical record entries made by the scribe were at my direction and in my presence. I have reviewed the chart and agree that the record reflects my personal performance and is accurate and complete  Electronically signed:   Lillie Kingsley MD  CT ICU attending     ICU time: ** mins     By signing my name below, I, Baldemar Hernandez, attest that this documentation has been prepared under the direction and in the presence of Lillie Kingsley MD  Electronically signed: Baldemar Hernandez, 01-23-25 @ 07:06             Patient seen and examined at the bedside.    Remains critically ill on continuous ICU monitoring, at risk for life threatening decompensation.  All Labs, data reviewed. Plan of care discussed in length during multi-disciplinary ICU rounds.       Brief Summary:  55 yo M with multiple medical problems including CAD s/p PCI in April 2024 s/p CABG x 3 on 12/30/24  1/2: Intubated for hypoxia & left lung white out s/p awake bronch with removal of copious mucopurulent secretions  1/4: s/p IABP insertion, sepsis/septic shock due to E coli   ESBL pneumonia, collapsed Left Lung, s/p multiple bronch and tracheostomy tube placement 1/18    24 Hour events:  Blood culture positive for VRI, Line changed  Linezolid started, ID following  CT scan positive for possible abscess formation   Continue PT    Objective:  Vital Signs Last 24 Hrs  T(C): 36.9 (23 Jan 2025 04:00), Max: 36.9 (22 Jan 2025 15:10)  T(F): 98.4 (23 Jan 2025 04:00), Max: 98.5 (22 Jan 2025 15:10)  HR: 63 (23 Jan 2025 06:00) (57 - 82)  BP: 155/63 (23 Jan 2025 06:00) (105/59 - 205/79)  BP(mean): 90 (23 Jan 2025 06:00) (70 - 114)  RR: 22 (23 Jan 2025 06:00) (10 - 31)  SpO2: 100% (23 Jan 2025 06:00) (91% - 100%)    Parameters below as of 23 Jan 2025 05:20  Patient On (Oxygen Delivery Method): ventilator    Mode: AC/ CMV (Assist Control/ Continuous Mandatory Ventilation)  RR (machine): 22  FiO2: 40  PEEP: 10  ITime: 0.1  MAP: 17  PC: 16  PIP: 27    Physical Exam:  General: Alert and interactive,   Neurology: Oriented, following commands. ambulates  Respiratory: Breath sounds bilaterally, trach collar  CV: Sinus  Abdominal: Soft, Nontender  Extremities: Warm, well-perfused  Right arm tender, but stable  -------------------------------------------------------------------------------------------------------------------------------    Labs:                        9.7    9.33  )-----------( 314      ( 23 Jan 2025 00:33 )             30.4     01-23    134[L]  |  97  |  30[H]  ----------------------------<  109[H]  3.5   |  24  |  4.69[H]    Ca    8.9      23 Jan 2025 00:33  Phos  2.4     01-23  Mg     2.2     01-23    TPro  5.8[L]  /  Alb  2.6[L]  /  TBili  0.6  /  DBili  x   /  AST  22  /  ALT  9[L]  /  AlkPhos  117  01-23    LIVER FUNCTIONS - ( 23 Jan 2025 00:33 )  Alb: 2.6 g/dL / Pro: 5.8 g/dL / ALK PHOS: 117 U/L / ALT: 9 U/L / AST: 22 U/L / GGT: x           PT/INR - ( 23 Jan 2025 00:33 )   PT: 15.8 sec;   INR: 1.39 ratio       PTT - ( 23 Jan 2025 00:33 )  PTT:33.5 sec  ABG - ( 21 Jan 2025 23:46 )  pH, Arterial: 7.44  pH, Blood: x     /  pCO2: 43    /  pO2: 112   / HCO3: 29    / Base Excess: 4.5   /  SaO2: 99.2        ALL MEDICATIONS   MEDICATIONS  (STANDING):  acetaminophen   IVPB .. 1000 milliGRAM(s) IV Intermittent once  albuterol    0.083% 2.5 milliGRAM(s) Nebulizer every 6 hours  aMIOdarone    Tablet   Oral   aMIOdarone    Tablet 200 milliGRAM(s) Oral daily  ascorbic acid 500 milliGRAM(s) Oral daily  aspirin  chewable 81 milliGRAM(s) Oral daily  atorvastatin 80 milliGRAM(s) Oral at bedtime  bisacodyl Suppository 10 milliGRAM(s) Rectal once  chlorhexidine 0.12% Liquid 15 milliLiter(s) Oral Mucosa every 12 hours  chlorhexidine 2% Cloths 1 Application(s) Topical daily  chlorhexidine 4% Liquid 1 Application(s) Topical <User Schedule>  dextrose 50% Injectable 50 milliLiter(s) IV Push every 15 minutes  epoetin ignacia (EPOGEN) Injectable 40063 Unit(s) IV Push <User Schedule>  gabapentin 100 milliGRAM(s) Oral every 12 hours  heparin   Injectable 5000 Unit(s) SubCutaneous every 8 hours  hydrALAZINE 10 milliGRAM(s) Oral every 8 hours  HYDROmorphone  Injectable 0.5 milliGRAM(s) IV Push once  insulin lispro (ADMELOG) corrective regimen sliding scale   SubCutaneous every 6 hours  meropenem  IVPB 500 milliGRAM(s) IV Intermittent every 24 hours  meropenem  IVPB      methocarbamol 500 milliGRAM(s) Oral every 8 hours  mirtazapine 7.5 milliGRAM(s) Oral daily  Nephro-elicia 1 Tablet(s) Oral daily  pantoprazole  Injectable 40 milliGRAM(s) IV Push daily  sodium chloride 0.65% Nasal 1 Spray(s) Both Nostrils every 12 hours  sodium chloride 0.9%. 1000 milliLiter(s) (10 mL/Hr) IV Continuous <Continuous>  sodium chloride 3%  Inhalation 4 milliLiter(s) Inhalation every 6 hours  traZODone 100 milliGRAM(s) Oral at bedtime  vancomycin  IVPB      vancomycin  IVPB 1000 milliGRAM(s) IV Intermittent every 24 hours    MEDICATIONS  (PRN):  acetaminophen     Tablet .. 650 milliGRAM(s) Oral every 6 hours PRN Mild Pain (1 - 3)  benzocaine 20% Spray 1 Spray(s) Topical every 6 hours PRN throat pain  lidocaine/prilocaine Cream 1 Application(s) Topical daily PRN pre-HD  sodium chloride 0.9% lock flush 10 milliLiter(s) IV Push every 1 hour PRN Pre/post blood products, medications, blood draw, and to maintain line patency  ------------------------------------------------------------------------------------------------------------------------------  Assessment:  55 yo M s/p CABG x 3 on 12/30/24    Postop acute respiratory failure  Acute blood loss anemia  Ascites    ESRD on iHD   E coli bacteremia 2/2 pneumonia    Plan:  ***Neuro***  Postoperative acute pain control with Gabapentin and prns.  Trazodone nightly  PT ongoing    ***Cardiovascular***  s/p CABG x 3  A fib s/p cardioversion - Amiodarone daily.  Hydralazine for hypertension.  ASA / Statin daily    ***Pulmonary***  Postoperative acute respiratory failure  Tracheostomy 1/18   s/p Bronchoscopy 1/18. 1/20  Tolerates trach collar, keep on a vent at night  Start on 3 Nebs, albuterol    ***GI***  On Tube feeds at goal  Protonix for stress ulcer prophylaxis.   Ascites: Last paracentesis 1/11    ***Renal***  ESRD - last HD 1/17: -2.5L UF (MWF)  Nephro-Elicia for renal support    ***ID***  Sepsis/septic shock due to ESBL E. coli -  Meropenem. inhaled mary  Course completed  Trend leukocytosis   PO Vancomycin discontinued   Sacral wound - continue wound care, may benefit from a VAC    ***Endocrine***  Hyperglycemia - Insulin sliding scale    ***Hematology***  Acute blood loss anemia  CBC, coags  Continue  Epogen.  Heparin SQ for DVT        I, Lillie Kingsley MD, personally performed the services described in this documentation. All medical record entries made by the scribe were at my direction and in my presence. I have reviewed the chart and agree that the record reflects my personal performance and is accurate and complete  Electronically signed:   Lillie Kingsley MD  CT ICU attending     ICU time: ** mins     By signing my name below, I, Baldemar Hernandez, attest that this documentation has been prepared under the direction and in the presence of Lillie Kingsley MD  Electronically signed: Baldemar Hernandez, 01-23-25 @ 07:06             Patient seen and examined at the bedside.    Remains critically ill on continuous ICU monitoring, at risk for life threatening decompensation.  All Labs, data reviewed. Plan of care discussed in length during multi-disciplinary ICU rounds.       Brief Summary:  53 yo M with multiple medical problems including CAD s/p PCI in April 2024 s/p CABG x 3 on 12/30/24  1/2: Intubated for hypoxia & left lung white out s/p awake bronch with removal of copious mucopurulent secretions  1/4: s/p IABP insertion, sepsis/septic shock due to E coli   ESBL pneumonia, collapsed Left Lung, s/p multiple bronch and tracheostomy tube placement 1/18    24 Hour events:  Blood culture positive for VRI, Line changed  Linezolid started, ID following  CT scan positive for possible abscess    Objective:  Vital Signs Last 24 Hrs  T(C): 36.9 (23 Jan 2025 04:00), Max: 36.9 (22 Jan 2025 15:10)  T(F): 98.4 (23 Jan 2025 04:00), Max: 98.5 (22 Jan 2025 15:10)  HR: 63 (23 Jan 2025 06:00) (57 - 82)  BP: 155/63 (23 Jan 2025 06:00) (105/59 - 205/79)  BP(mean): 90 (23 Jan 2025 06:00) (70 - 114)  RR: 22 (23 Jan 2025 06:00) (10 - 31)  SpO2: 100% (23 Jan 2025 06:00) (91% - 100%)    Parameters below as of 23 Jan 2025 05:20  Patient On (Oxygen Delivery Method): ventilator    Mode: AC/ CMV (Assist Control/ Continuous Mandatory Ventilation)  RR (machine): 22  FiO2: 40  PEEP: 10  ITime: 0.1  MAP: 17  PC: 16  PIP: 27    Physical Exam:  General: Alert and interactive,   Neurology: Oriented, following commands. ambulates  Respiratory: Breath sounds bilaterally, trach collar  CV: Sinus  Abdominal: Soft, Nontender  Extremities: Warm, well-perfused  Right arm tender, but stable  -------------------------------------------------------------------------------------------------------------------------------    Labs:                        9.7    9.33  )-----------( 314      ( 23 Jan 2025 00:33 )             30.4     01-23    134[L]  |  97  |  30[H]  ----------------------------<  109[H]  3.5   |  24  |  4.69[H]    Ca    8.9      23 Jan 2025 00:33  Phos  2.4     01-23  Mg     2.2     01-23    TPro  5.8[L]  /  Alb  2.6[L]  /  TBili  0.6  /  DBili  x   /  AST  22  /  ALT  9[L]  /  AlkPhos  117  01-23    LIVER FUNCTIONS - ( 23 Jan 2025 00:33 )  Alb: 2.6 g/dL / Pro: 5.8 g/dL / ALK PHOS: 117 U/L / ALT: 9 U/L / AST: 22 U/L / GGT: x           PT/INR - ( 23 Jan 2025 00:33 )   PT: 15.8 sec;   INR: 1.39 ratio       PTT - ( 23 Jan 2025 00:33 )  PTT:33.5 sec  ABG - ( 21 Jan 2025 23:46 )  pH, Arterial: 7.44  pH, Blood: x     /  pCO2: 43    /  pO2: 112   / HCO3: 29    / Base Excess: 4.5   /  SaO2: 99.2        ALL MEDICATIONS   MEDICATIONS  (STANDING):  acetaminophen   IVPB .. 1000 milliGRAM(s) IV Intermittent once  albuterol    0.083% 2.5 milliGRAM(s) Nebulizer every 6 hours  aMIOdarone    Tablet   Oral   aMIOdarone    Tablet 200 milliGRAM(s) Oral daily  ascorbic acid 500 milliGRAM(s) Oral daily  aspirin  chewable 81 milliGRAM(s) Oral daily  atorvastatin 80 milliGRAM(s) Oral at bedtime  bisacodyl Suppository 10 milliGRAM(s) Rectal once  chlorhexidine 0.12% Liquid 15 milliLiter(s) Oral Mucosa every 12 hours  chlorhexidine 2% Cloths 1 Application(s) Topical daily  chlorhexidine 4% Liquid 1 Application(s) Topical <User Schedule>  dextrose 50% Injectable 50 milliLiter(s) IV Push every 15 minutes  epoetin ignacia (EPOGEN) Injectable 70069 Unit(s) IV Push <User Schedule>  gabapentin 100 milliGRAM(s) Oral every 12 hours  heparin   Injectable 5000 Unit(s) SubCutaneous every 8 hours  hydrALAZINE 10 milliGRAM(s) Oral every 8 hours  HYDROmorphone  Injectable 0.5 milliGRAM(s) IV Push once  insulin lispro (ADMELOG) corrective regimen sliding scale   SubCutaneous every 6 hours  meropenem  IVPB 500 milliGRAM(s) IV Intermittent every 24 hours  meropenem  IVPB      methocarbamol 500 milliGRAM(s) Oral every 8 hours  mirtazapine 7.5 milliGRAM(s) Oral daily  Nephro-elicia 1 Tablet(s) Oral daily  pantoprazole  Injectable 40 milliGRAM(s) IV Push daily  sodium chloride 0.65% Nasal 1 Spray(s) Both Nostrils every 12 hours  sodium chloride 0.9%. 1000 milliLiter(s) (10 mL/Hr) IV Continuous <Continuous>  sodium chloride 3%  Inhalation 4 milliLiter(s) Inhalation every 6 hours  traZODone 100 milliGRAM(s) Oral at bedtime  vancomycin  IVPB      vancomycin  IVPB 1000 milliGRAM(s) IV Intermittent every 24 hours    MEDICATIONS  (PRN):  acetaminophen     Tablet .. 650 milliGRAM(s) Oral every 6 hours PRN Mild Pain (1 - 3)  benzocaine 20% Spray 1 Spray(s) Topical every 6 hours PRN throat pain  lidocaine/prilocaine Cream 1 Application(s) Topical daily PRN pre-HD  sodium chloride 0.9% lock flush 10 milliLiter(s) IV Push every 1 hour PRN Pre/post blood products, medications, blood draw, and to maintain line patency  ------------------------------------------------------------------------------------------------------------------------------  Assessment:  53 yo M s/p CABG x 3 on 12/30/24    Postop acute respiratory failure  Acute blood loss anemia  Ascites    ESRD on iHD   E coli bacteremia 2/2 pneumonia    Plan:  ***Neuro***  Postoperative acute pain control with Gabapentin and prns.  Gabapentin robaxin, Tylenol for pain  PT ongoing    ***Cardiovascular***  s/p CABG x 3  A fib s/p cardioversion - Amiodarone daily.  ASA / Statin daily    ***Pulmonary***  Postoperative acute respiratory failure  Tracheostomy 1/18   s/p Bronchoscopy 1/18. 1/20, 1/21  Left lung PNA, + e coli  Tolerates trach collar, keep on a vent at night  on HT 3% nebs and albuterol    ***GI***  On Tube feeds at goal  Protonix for stress ulcer prophylaxis.   Ascites: Last paracentesis 1/11    ***Renal***  ESRD - last HD 1/17: -2.5L UF (MWF)  Nephro-Elicia for renal support    ***ID***   E. coli PNA -  Meropenem. inhaled mary completed  New abscess  in right buttocks and coccygeal area  Meropenum re-started  Consult surgery for possible I &D  VRE bacteremia, started on linezolid    ***Endocrine***  Hyperglycemia - Insulin sliding scale    ***Hematology***  Acute blood loss anemia  CBC, coags  Continue  Epogen.  Heparin SQ for DVT    I, Lillie Kingsley MD, personally performed the services described in this documentation. All medical record entries made by the scribe were at my direction and in my presence. I have reviewed the chart and agree that the record reflects my personal performance and is accurate and complete  Electronically signed:   Lillie Kingsley MD  CT ICU attending     ICU time: 50 mins     By signing my name below, I, Baldemar Hernandez, attest that this documentation has been prepared under the direction and in the presence of Lillie Kingsley MD  Electronically signed: Baldemar Hernandez, 01-23-25 @ 07:06

## 2025-01-24 LAB
ALBUMIN SERPL ELPH-MCNC: 2.7 G/DL — LOW (ref 3.3–5)
ALP SERPL-CCNC: 120 U/L — SIGNIFICANT CHANGE UP (ref 40–120)
ALT FLD-CCNC: 10 U/L — SIGNIFICANT CHANGE UP (ref 10–45)
AST SERPL-CCNC: 19 U/L — SIGNIFICANT CHANGE UP (ref 10–40)
B PERT IGG+IGM PNL SER: ABNORMAL
BASOPHILS # BLD AUTO: 0.04 K/UL — SIGNIFICANT CHANGE UP (ref 0–0.2)
BASOPHILS NFR BLD AUTO: 0.4 % — SIGNIFICANT CHANGE UP (ref 0–2)
BILIRUB SERPL-MCNC: 0.5 MG/DL — SIGNIFICANT CHANGE UP (ref 0.2–1.2)
BUN SERPL-MCNC: 38 MG/DL — HIGH (ref 7–23)
CALCIUM SERPL-MCNC: 8.4 MG/DL — SIGNIFICANT CHANGE UP (ref 8.4–10.5)
CHLORIDE SERPL-SCNC: 96 MMOL/L — SIGNIFICANT CHANGE UP (ref 96–108)
CO2 SERPL-SCNC: 23 MMOL/L — SIGNIFICANT CHANGE UP (ref 22–31)
COLOR FLD: SIGNIFICANT CHANGE UP
EGFR: 11 ML/MIN/1.73M2 — LOW
EOSINOPHIL # BLD AUTO: 0.9 K/UL — HIGH (ref 0–0.5)
FLUID INTAKE SUBSTANCE CLASS: SIGNIFICANT CHANGE UP
FOLATE SERPL-MCNC: 15.3 NG/ML — SIGNIFICANT CHANGE UP
GLUCOSE SERPL-MCNC: 88 MG/DL — SIGNIFICANT CHANGE UP (ref 70–99)
GRAM STN FLD: SIGNIFICANT CHANGE UP
GRAM STN FLD: SIGNIFICANT CHANGE UP
HCT VFR BLD CALC: 29.6 % — LOW (ref 39–50)
HERPES SIMPLEX VIRUS 1/2 SURVEILLANCE PCR SOURCE: SIGNIFICANT CHANGE UP
HGB BLD-MCNC: 9.4 G/DL — LOW (ref 13–17)
HSV1+2 DNA SPEC QL NAA+PROBE: ABNORMAL
IMM GRANULOCYTES NFR BLD AUTO: 0.8 % — SIGNIFICANT CHANGE UP (ref 0–0.9)
LYMPHOCYTES # BLD AUTO: 6.6 % — LOW (ref 13–44)
MAGNESIUM SERPL-MCNC: 2.2 MG/DL — SIGNIFICANT CHANGE UP (ref 1.6–2.6)
MCHC RBC-ENTMCNC: 28.7 PG — SIGNIFICANT CHANGE UP (ref 27–34)
MCV RBC AUTO: 90.5 FL — SIGNIFICANT CHANGE UP (ref 80–100)
MONOCYTES # BLD AUTO: 0.77 K/UL — SIGNIFICANT CHANGE UP (ref 0–0.9)
MONOCYTES NFR BLD AUTO: 7.6 % — SIGNIFICANT CHANGE UP (ref 2–14)
NEUTROPHILS # BLD AUTO: 7.62 K/UL — HIGH (ref 1.8–7.4)
NEUTROPHILS NFR BLD AUTO: 75.7 % — SIGNIFICANT CHANGE UP (ref 43–77)
NEUTROPHILS-BODY FLUID: SIGNIFICANT CHANGE UP %
NIGHT BLUE STAIN TISS: SIGNIFICANT CHANGE UP
NRBC BLD-RTO: 0 /100 WBCS — SIGNIFICANT CHANGE UP (ref 0–0)
PHOSPHATE SERPL-MCNC: 3.5 MG/DL — SIGNIFICANT CHANGE UP (ref 2.5–4.5)
PLATELET # BLD AUTO: 317 K/UL — SIGNIFICANT CHANGE UP (ref 150–400)
POTASSIUM SERPL-SCNC: 3.9 MMOL/L — SIGNIFICANT CHANGE UP (ref 3.5–5.3)
PROT SERPL-MCNC: 5.6 G/DL — LOW (ref 6–8.3)
RBC # BLD: 3.27 M/UL — LOW (ref 4.2–5.8)
RBC # FLD: 19.6 % — HIGH (ref 10.3–14.5)
RCV VOL RI: 128 CELLS/UL — SIGNIFICANT CHANGE UP
SODIUM SERPL-SCNC: 134 MMOL/L — LOW (ref 135–145)
SPECIMEN SOURCE FLD: SIGNIFICANT CHANGE UP
SPECIMEN SOURCE: SIGNIFICANT CHANGE UP
SPECIMEN SOURCE: SIGNIFICANT CHANGE UP
TOTAL NUCLEATED CELL COUNT, BODY FLUID: 1210 CELLS/UL — SIGNIFICANT CHANGE UP
TUBE TYPE: SIGNIFICANT CHANGE UP
VIT B12 SERPL-MCNC: 1713 PG/ML — HIGH (ref 232–1245)
WBC # BLD: 10.07 K/UL — SIGNIFICANT CHANGE UP (ref 3.8–10.5)

## 2025-01-24 PROCEDURE — 99233 SBSQ HOSP IP/OBS HIGH 50: CPT | Mod: 25

## 2025-01-24 PROCEDURE — 93010 ELECTROCARDIOGRAM REPORT: CPT

## 2025-01-24 PROCEDURE — 99232 SBSQ HOSP IP/OBS MODERATE 35: CPT

## 2025-01-24 PROCEDURE — 71045 X-RAY EXAM CHEST 1 VIEW: CPT | Mod: 26

## 2025-01-24 PROCEDURE — G0545: CPT

## 2025-01-24 PROCEDURE — 31624 DX BRONCHOSCOPE/LAVAGE: CPT

## 2025-01-24 PROCEDURE — 99232 SBSQ HOSP IP/OBS MODERATE 35: CPT | Mod: GC

## 2025-01-24 PROCEDURE — 76705 ECHO EXAM OF ABDOMEN: CPT | Mod: 26

## 2025-01-24 PROCEDURE — 99291 CRITICAL CARE FIRST HOUR: CPT

## 2025-01-24 PROCEDURE — 31645 BRNCHSC W/THER ASPIR 1ST: CPT

## 2025-01-24 RX ORDER — FENTANYL CITRATE-0.9 % NACL/PF 100MCG/2ML
150 SYRINGE (ML) INTRAVENOUS ONCE
Refills: 0 | Status: DISCONTINUED | OUTPATIENT
Start: 2025-01-24 | End: 2025-01-24

## 2025-01-24 RX ORDER — HYDROMORPHONE/SOD CHLOR,ISO/PF 2 MG/10 ML
0.5 SYRINGE (ML) INJECTION ONCE
Refills: 0 | Status: DISCONTINUED | OUTPATIENT
Start: 2025-01-24 | End: 2025-01-24

## 2025-01-24 RX ORDER — FENTANYL CITRATE-0.9 % NACL/PF 100MCG/2ML
25 SYRINGE (ML) INTRAVENOUS ONCE
Refills: 0 | Status: DISCONTINUED | OUTPATIENT
Start: 2025-01-24 | End: 2025-01-24

## 2025-01-24 RX ORDER — ACETYLCYSTEINE 200 MG/ML
4 INHALANT RESPIRATORY (INHALATION) EVERY 8 HOURS
Refills: 0 | Status: DISCONTINUED | OUTPATIENT
Start: 2025-01-24 | End: 2025-01-24

## 2025-01-24 RX ORDER — ROCURONIUM BROMIDE 10 MG/ML
50 INJECTION, SOLUTION INTRAVENOUS ONCE
Refills: 0 | Status: COMPLETED | OUTPATIENT
Start: 2025-01-24 | End: 2025-01-24

## 2025-01-24 RX ORDER — TRAZODONE HCL 100 MG
25 TABLET ORAL ONCE
Refills: 0 | Status: COMPLETED | OUTPATIENT
Start: 2025-01-24 | End: 2025-01-24

## 2025-01-24 RX ORDER — FENTANYL CITRATE-0.9 % NACL/PF 100MCG/2ML
12.5 SYRINGE (ML) INTRAVENOUS ONCE
Refills: 0 | Status: DISCONTINUED | OUTPATIENT
Start: 2025-01-24 | End: 2025-01-24

## 2025-01-24 RX ORDER — PROPOFOL 10 MG/ML
150 INJECTION, EMULSION INTRAVENOUS ONCE
Refills: 0 | Status: COMPLETED | OUTPATIENT
Start: 2025-01-24 | End: 2025-01-24

## 2025-01-24 RX ORDER — AMIKACIN SULFATE 250 MG/ML
430 VIAL (ML) INJECTION ONCE
Refills: 0 | Status: COMPLETED | OUTPATIENT
Start: 2025-01-24 | End: 2025-01-24

## 2025-01-24 RX ORDER — SUGAMMADEX 100 MG/ML
200 INJECTION, SOLUTION INTRAVENOUS ONCE
Refills: 0 | Status: COMPLETED | OUTPATIENT
Start: 2025-01-24 | End: 2025-01-24

## 2025-01-24 RX ORDER — MELATONIN 5 MG
5 TABLET ORAL AT BEDTIME
Refills: 0 | Status: DISCONTINUED | OUTPATIENT
Start: 2025-01-24 | End: 2025-03-19

## 2025-01-24 RX ORDER — FENTANYL CITRATE-0.9 % NACL/PF 100MCG/2ML
200 SYRINGE (ML) INTRAVENOUS ONCE
Refills: 0 | Status: DISCONTINUED | OUTPATIENT
Start: 2025-01-24 | End: 2025-01-24

## 2025-01-24 RX ORDER — ACETAMINOPHEN 500 MG/5ML
1000 LIQUID (ML) ORAL ONCE
Refills: 0 | Status: COMPLETED | OUTPATIENT
Start: 2025-01-24 | End: 2025-01-24

## 2025-01-24 RX ORDER — TRAZODONE HCL 100 MG
50 TABLET ORAL AT BEDTIME
Refills: 0 | Status: DISCONTINUED | OUTPATIENT
Start: 2025-01-24 | End: 2025-02-26

## 2025-01-24 RX ORDER — GLYCOPYRROLATE 0.2 MG/ML
0.2 INJECTION INTRAMUSCULAR; INTRAVENOUS ONCE
Refills: 0 | Status: COMPLETED | OUTPATIENT
Start: 2025-01-24 | End: 2025-01-24

## 2025-01-24 RX ORDER — ACETYLCYSTEINE 200 MG/ML
4 INHALANT RESPIRATORY (INHALATION) EVERY 6 HOURS
Refills: 0 | Status: DISCONTINUED | OUTPATIENT
Start: 2025-01-24 | End: 2025-02-12

## 2025-01-24 RX ORDER — PROPOFOL 10 MG/ML
20 INJECTION, EMULSION INTRAVENOUS
Qty: 500 | Refills: 0 | Status: DISCONTINUED | OUTPATIENT
Start: 2025-01-24 | End: 2025-01-24

## 2025-01-24 RX ORDER — ALBUMIN (HUMAN) 12.5 G/50ML
100 INJECTION, SOLUTION INTRAVENOUS
Refills: 0 | Status: COMPLETED | OUTPATIENT
Start: 2025-01-24 | End: 2025-01-24

## 2025-01-24 RX ORDER — FENTANYL CITRATE-0.9 % NACL/PF 100MCG/2ML
50 SYRINGE (ML) INTRAVENOUS ONCE
Refills: 0 | Status: DISCONTINUED | OUTPATIENT
Start: 2025-01-24 | End: 2025-01-24

## 2025-01-24 RX ADMIN — SUGAMMADEX 200 MILLIGRAM(S): 100 INJECTION, SOLUTION INTRAVENOUS at 11:15

## 2025-01-24 RX ADMIN — METHOCARBAMOL 500 MILLIGRAM(S): 500 TABLET, FILM COATED ORAL at 21:12

## 2025-01-24 RX ADMIN — Medication 81 MILLIGRAM(S): at 12:53

## 2025-01-24 RX ADMIN — Medication 25 MICROGRAM(S): at 09:35

## 2025-01-24 RX ADMIN — MIRTAZAPINE 7.5 MILLIGRAM(S): 30 TABLET, FILM COATED ORAL at 14:32

## 2025-01-24 RX ADMIN — Medication 0.5 MILLIGRAM(S): at 23:45

## 2025-01-24 RX ADMIN — GABAPENTIN 100 MILLIGRAM(S): 400 CAPSULE ORAL at 17:22

## 2025-01-24 RX ADMIN — Medication 1 SPRAY(S): at 17:23

## 2025-01-24 RX ADMIN — GABAPENTIN 100 MILLIGRAM(S): 400 CAPSULE ORAL at 05:34

## 2025-01-24 RX ADMIN — Medication 1000 MILLIGRAM(S): at 20:00

## 2025-01-24 RX ADMIN — Medication 400 MILLIGRAM(S): at 23:45

## 2025-01-24 RX ADMIN — METHOCARBAMOL 500 MILLIGRAM(S): 500 TABLET, FILM COATED ORAL at 13:52

## 2025-01-24 RX ADMIN — Medication 12.5 MICROGRAM(S): at 21:30

## 2025-01-24 RX ADMIN — ATORVASTATIN CALCIUM 80 MILLIGRAM(S): 80 TABLET, FILM COATED ORAL at 21:12

## 2025-01-24 RX ADMIN — Medication 50 MILLIGRAM(S): at 21:12

## 2025-01-24 RX ADMIN — PROPOFOL 150 MILLIGRAM(S): 10 INJECTION, EMULSION INTRAVENOUS at 11:15

## 2025-01-24 RX ADMIN — Medication 10 MILLILITER(S): at 19:00

## 2025-01-24 RX ADMIN — ALBUMIN (HUMAN) 50 MILLILITER(S): 12.5 INJECTION, SOLUTION INTRAVENOUS at 17:24

## 2025-01-24 RX ADMIN — HEPARIN SODIUM 5000 UNIT(S): 1000 INJECTION INTRAVENOUS; SUBCUTANEOUS at 21:12

## 2025-01-24 RX ADMIN — Medication 0.5 MILLIGRAM(S): at 17:58

## 2025-01-24 RX ADMIN — POLYETHYLENE GLYCOL 3350 17 GRAM(S): 17 POWDER, FOR SOLUTION ORAL at 05:35

## 2025-01-24 RX ADMIN — EPOETIN ALFA 10000 UNIT(S): 10000 SOLUTION INTRAVENOUS; SUBCUTANEOUS at 14:45

## 2025-01-24 RX ADMIN — Medication 0.5 MILLIGRAM(S): at 23:30

## 2025-01-24 RX ADMIN — Medication 101.72 MILLIGRAM(S): at 09:38

## 2025-01-24 RX ADMIN — HEPARIN SODIUM 5000 UNIT(S): 1000 INJECTION INTRAVENOUS; SUBCUTANEOUS at 13:52

## 2025-01-24 RX ADMIN — Medication 400 MILLIGRAM(S): at 17:58

## 2025-01-24 RX ADMIN — Medication 1000 MILLIGRAM(S): at 23:45

## 2025-01-24 RX ADMIN — LINEZOLID 300 MILLIGRAM(S): 2 INJECTION, SOLUTION INTRAVENOUS at 01:10

## 2025-01-24 RX ADMIN — ACETYLCYSTEINE 4 MILLILITER(S): 200 INHALANT RESPIRATORY (INHALATION) at 23:39

## 2025-01-24 RX ADMIN — Medication 150 MICROGRAM(S): at 11:15

## 2025-01-24 RX ADMIN — HEPARIN SODIUM 5000 UNIT(S): 1000 INJECTION INTRAVENOUS; SUBCUTANEOUS at 05:36

## 2025-01-24 RX ADMIN — ACETYLCYSTEINE 4 MILLILITER(S): 200 INHALANT RESPIRATORY (INHALATION) at 17:33

## 2025-01-24 RX ADMIN — METHOCARBAMOL 500 MILLIGRAM(S): 500 TABLET, FILM COATED ORAL at 05:34

## 2025-01-24 RX ADMIN — Medication 500 MILLIGRAM(S): at 12:53

## 2025-01-24 RX ADMIN — Medication 12.5 MICROGRAM(S): at 21:15

## 2025-01-24 RX ADMIN — Medication 2.5 MILLIGRAM(S): at 05:03

## 2025-01-24 RX ADMIN — Medication 2.5 MILLIGRAM(S): at 17:32

## 2025-01-24 RX ADMIN — Medication 40 MILLIGRAM(S): at 12:53

## 2025-01-24 RX ADMIN — GLYCOPYRROLATE 0.2 MILLIGRAM(S): 0.2 INJECTION INTRAMUSCULAR; INTRAVENOUS at 12:17

## 2025-01-24 RX ADMIN — Medication 650 MILLIGRAM(S): at 09:27

## 2025-01-24 RX ADMIN — Medication 1 APPLICATION(S): at 21:12

## 2025-01-24 RX ADMIN — Medication 5 MILLIGRAM(S): at 21:13

## 2025-01-24 RX ADMIN — Medication 25 MICROGRAM(S): at 09:50

## 2025-01-24 RX ADMIN — Medication 25 MILLIGRAM(S): at 02:30

## 2025-01-24 RX ADMIN — Medication 1 TABLET(S): at 12:52

## 2025-01-24 RX ADMIN — MEROPENEM 100 MILLIGRAM(S): 1 INJECTION INTRAVENOUS at 13:52

## 2025-01-24 RX ADMIN — ROCURONIUM BROMIDE 50 MILLIGRAM(S): 10 INJECTION, SOLUTION INTRAVENOUS at 11:15

## 2025-01-24 RX ADMIN — Medication 1 APPLICATION(S): at 05:33

## 2025-01-24 RX ADMIN — Medication 150 MICROGRAM(S): at 11:30

## 2025-01-24 RX ADMIN — Medication 2.5 MILLIGRAM(S): at 11:16

## 2025-01-24 RX ADMIN — Medication 15 MILLILITER(S): at 17:23

## 2025-01-24 RX ADMIN — AMIODARONE HYDROCHLORIDE 200 MILLIGRAM(S): 50 INJECTION, SOLUTION INTRAVENOUS at 05:34

## 2025-01-24 RX ADMIN — Medication 0.5 MILLIGRAM(S): at 20:00

## 2025-01-24 RX ADMIN — POLYETHYLENE GLYCOL 3350 17 GRAM(S): 17 POWDER, FOR SOLUTION ORAL at 17:23

## 2025-01-24 RX ADMIN — Medication 2.5 MILLIGRAM(S): at 23:27

## 2025-01-24 RX ADMIN — LINEZOLID 300 MILLIGRAM(S): 2 INJECTION, SOLUTION INTRAVENOUS at 14:31

## 2025-01-24 RX ADMIN — Medication 650 MILLIGRAM(S): at 08:57

## 2025-01-24 NOTE — PROGRESS NOTE ADULT - SUBJECTIVE AND OBJECTIVE BOX
Patient is a 54y old  Male who presents with a chief complaint of s/p CABG x 3 (23 Jan 2025 07:06)    f/u bacteremia    Interval History/ROS:      PAST MEDICAL & SURGICAL HISTORY:  Type 2 diabetes mellitus  Hypertension  ESRD on HD since 2019 - T/Th/Sat  Bone spur, right shoulder- hx of - sx done  Anemia  S/P arthroscopy of right shoulder 2010  H/O right wrist surgery, tendons repair - at age 14  AV fistula  H/O ventral hernia repair  S/P cholecystectomy    Allergies  No Known Allergies    ANTIMICROBIALS:  cefuroxime  IVPB (12/31-1/1)  piperacillin/tazobactam IVPB (1/2-1/3)  vancomycin  IVPB (1/2-1/4, 1/22)  gentamicin   IVPB (1/3 x1)  amikacin  IVPB (1/11, 1/24)  tobramycin for Nebulization (1/11-1/19)  vancomycin    Solution (1/12-1/19)  fluconAZOLE IVPB (1/14-1/15)    active:  linezolid  IVPB 600 every 12 hours (1/23-)  meropenem  IVPB 500 every 24 hours meropenem  IVPB (1/3-1/18; 1/22-))    MEDICATIONS  (STANDING):  acetylcysteine 10%  Inhalation 4 every 8 hours  albuterol    0.083% 2.5 every 6 hours    aMIOdarone    Tablet 200 daily  aspirin  chewable 81 daily  atorvastatin 80 at bedtime  bisacodyl Suppository 10 once  dextrose 50% Injectable 50 every 15 minutes  epoetin ignacia (EPOGEN) Injectable 57941 <User Schedule>  fentaNYL    Injectable 150 once  gabapentin 100 every 12 hours  heparin   Injectable 5000 every 8 hours  insulin lispro (ADMELOG) corrective regimen sliding scale  every 6 hours  methocarbamol 500 every 8 hours  mirtazapine 7.5 daily  pantoprazole  Injectable 40 daily  polyethylene glycol 3350 17 two times a day  propofol Injectable 150 once  rocuronium Injectable 50 once  rocuronium Injectable 50 once  sugammadex Injectable 200 once  traZODone 50 at bedtime    Vital Signs Last 24 Hrs  T(F): 98.2 (01-24-25 @ 08:00), Max: 98.5 (01-23-25 @ 20:00)  HR: 70 (01-24-25 @ 11:28)  BP: 133/75 (01-24-25 @ 10:00)  RR: 15 (01-24-25 @ 10:00)  SpO2: 100% (01-24-25 @ 11:28) (96% - 100%)    Physical Exam:                            9.4    10.07 )-----------( 317      ( 24 Jan 2025 00:26 )             29.6 01-24    134  |  96  |  38  ----------------------------<  88  3.9   |  23  |  5.64  Ca    8.4      24 Jan 2025 00:26Phos  3.5     01-24Mg     2.2     01-24  TPro  5.6  /  Alb  2.7  /  TBili  0.5  /  DBili  x   /  AST  19  /  ALT  10  /  AlkPhos  120  01-24 11/29/2024 Pathology:  Gallbladder   Final Diagnosis   Gallbladder; laparoscopic cholecystectomy:   - Gangrenous cholecystitis PERITONEAL FLUID     11/9/2024 Cytology:  Final Diagnosis   PERITONEAL FLUID   NEGATIVE FOR MALIGNANT CELLS.     MICROBIOLOGY:  Culture - Blood (collected 01-22-25 @ 12:20)  Source: .Blood BLOOD  Gram Stain (01-23-25 @ 08:29):    Growth in anaerobic bottle: Gram Positive Cocci in Pairs and Chains  Preliminary Report (01-23-25 @ 22:48):    Growth in anaerobic bottle: Enterococcus faecium    Direct identification is available within approximately 3-5    hours either by Blood Panel Multiplexed PCR or Direct    MALDI-TOF. Details: [email or web address]  Organism: Blood Culture PCR (01-23-25 @ 09:57)  Organism: Blood Culture PCR (01-23-25 @ 09:57)      Method Type: PCR      -  Enterococcus faecium,VRE: Detec    Culture - Bronchial (collected 01-20-25 @ 13:34)  Source: Bronchial  Gram Stain (01-21-25 @ 00:06):    Moderate polymorphonuclear leukocytes per low power field    Rare Squamous epithelial cells per low power field    No organisms seen per oil power field  Final Report (01-22-25 @ 07:44):    Commensal manjit consistent with body site    Culture - Bronchial (collected 01-18-25 @ 05:00)  Source: Bronchial  Gram Stain (01-18-25 @ 20:00):    Few polymorphonuclear leukocytes seen per low power field    No Squamous epithelial cells seen per low power field    No organisms seen per oil power field  Final Report (01-19-25 @ 22:41):    Commensal manjit consistent with body site    1/11 Bronchial AFB (-)  1/10 Bronchial cx (-)  1/9 BC (-)  1/7 BC (-)  1/5 BC (-)  1/3 Peritoneal Fungal, bacterial cx (-)  1/3 Bronchial AFB (-)  1/3 Bronchial cx (+) Numerous Escherichia coli ESBL  1/2 BC (+) Escherichia coli ESBL  1/2 Sputum cx (+) Escherichia coli ESBL    RADIOLOGY:  Xray Chest 1 View- PORTABLE-Routine (Xray Chest 1 View- PORTABLE-Routine in AM.) (01.24.25 @ 03:01) >  IMPRESSION: Postoperative lines and tubes described above. Cardiomegaly. Small left pleural effusion and/or left basilar atelectasis.    CT Chest w/ IV Cont (01.23.25 @ 12:34) >  IMPRESSION:  CHEST:  *  Near complete consolidation of the left lower lobe with volume loss and partial consolidation of the left upper lobe volume loss, suspect a combination of atelectasis and airspace disease. Groundglass opacities throughout the right lung and within nonconsolidated portions of the left   lung of uncertain etiology, possibly representing pulmonary edema or an infectious/inflammatory process.  *  Small bilateral pleural effusions, increased since 1/10/2025 with new loculation on the left.  *  Increased small to moderate pericardial effusion.  *  Redemonstrated nonocclusive thrombus in the right internal jugular vein, which partially encompasses the right IJ catheter.    CT Abdomen and Pelvis w/ IV Cont (01.23.25 @ 12:34) >  IMPRESSION:  ABDOMEN AND PELVIS:  *  Moderate to large ascites, mildly decreased since 1/10/2025.  Ill-defined infiltration within the mesentery and omentum with questioned pelvic peritoneal nodule. Suggest correlation with fluid sampling.  *  Nonspecific subcutaneous infiltration overlying the lower sacrum and coccyx on a background of anasarca. Ill-defined fluid located posterior and inferior to the coccyx measuring approximately 2.4 x 1.8 cm, which may represent confluent edema versus phlegmon or developing collection.  *  Cystic structure in the gallbladder fossa measuring 2.0 cm, possibly representing a gallbladder remnant or residual cystic duct, with a small collection not excluded.    US Chest (01.20.25 @ 13:46) >  IMPRESSION:  Minimal left diaphragmatic respiratory motion is seen, which is decreased relative to the right.    CT Abdomen and Pelvis w/ IV Cont (01.10.25 @ 15:41) >  IMPRESSION: Scattered consolidation in the left lung most severe in the lower lobe.  Nonocclusive thrombus right internal jugular vein. Large volume ascites.      ECHO:  TTE W or WO Ultrasound Enhancing Agent (01.19.25 @ 09:06) >  CONCLUSIONS:   1. Technically difficult image quality.   2. No pericardial effusion seen.   Patient is a 54y old  Male who presents with a chief complaint of s/p CABG x 3 (23 Jan 2025 07:06)    f/u bacteremia    Interval History/ROS:  awake, on vent via tracheostomy.  no pressors.  for HD.      PAST MEDICAL & SURGICAL HISTORY:  Type 2 diabetes mellitus  Hypertension  ESRD on HD since 2019 - T/Th/Sat  Bone spur, right shoulder- hx of - sx done  Anemia  S/P arthroscopy of right shoulder 2010  H/O right wrist surgery, tendons repair - at age 14  AV fistula  H/O ventral hernia repair  S/P cholecystectomy    Allergies  No Known Allergies    ANTIMICROBIALS:  cefuroxime  IVPB (12/31-1/1)  piperacillin/tazobactam IVPB (1/2-1/3)  vancomycin  IVPB (1/2-1/4, 1/22)  gentamicin   IVPB (1/3 x1)  amikacin  IVPB (1/11, 1/24)  tobramycin for Nebulization (1/11-1/19)  vancomycin    Solution (1/12-1/19)  fluconAZOLE IVPB (1/14-1/15)    active:  linezolid  IVPB 600 every 12 hours (1/23-)  meropenem  IVPB 500 every 24 hours meropenem  IVPB (1/3-1/18; 1/22-)    MEDICATIONS  (STANDING):  acetylcysteine 10%  Inhalation 4 every 8 hours  albuterol    0.083% 2.5 every 6 hours    aMIOdarone    Tablet 200 daily  aspirin  chewable 81 daily  atorvastatin 80 at bedtime  bisacodyl Suppository 10 once  dextrose 50% Injectable 50 every 15 minutes  epoetin ignacia (EPOGEN) Injectable 72742 <User Schedule>  fentaNYL    Injectable 150 once  gabapentin 100 every 12 hours  heparin   Injectable 5000 every 8 hours  insulin lispro (ADMELOG) corrective regimen sliding scale  every 6 hours  methocarbamol 500 every 8 hours  mirtazapine 7.5 daily  pantoprazole  Injectable 40 daily  polyethylene glycol 3350 17 two times a day  propofol Injectable 150 once  rocuronium Injectable 50 once  rocuronium Injectable 50 once  sugammadex Injectable 200 once  traZODone 50 at bedtime    Vital Signs Last 24 Hrs  T(F): 98.2 (01-24-25 @ 08:00), Max: 98.5 (01-23-25 @ 20:00)  HR: 70 (01-24-25 @ 11:28)  BP: 133/75 (01-24-25 @ 10:00)  RR: 15 (01-24-25 @ 10:00)  SpO2: 100% (01-24-25 @ 11:28) (96% - 100%)    PHYSICAL EXAM:  Constitutional: non-toxic  HEAD/EYES: anicteric  ENT:  trach site okay  Cardiovascular:   normal S1, S2  Respiratory:  clear BS bilaterally  GI:  soft, non-tender, normal bowel sounds  :  no blunt  Musculoskeletal:  no synovitis  Neurologic: awake and alert, no focal findings  Skin:  phlebitis rue?  Psychiatric:  awake, alert, appropriate mood                        9.4    10.07 )-----------( 317      ( 24 Jan 2025 00:26 )             29.6 01-24    134  |  96  |  38  ----------------------------<  88  3.9   |  23  |  5.64  Ca    8.4      24 Jan 2025 00:26Phos  3.5     01-24Mg     2.2     01-24  TPro  5.6  /  Alb  2.7  /  TBili  0.5  /  DBili  x   /  AST  19  /  ALT  10  /  AlkPhos  120  01-24 11/29/2024 Pathology:  Gallbladder   Final Diagnosis   Gallbladder; laparoscopic cholecystectomy:   - Gangrenous cholecystitis PERITONEAL FLUID     11/9/2024 Cytology:  Final Diagnosis   PERITONEAL FLUID   NEGATIVE FOR MALIGNANT CELLS.     MICROBIOLOGY:  Culture - Blood (collected 01-22-25 @ 12:20)  Source: .Blood BLOOD  Gram Stain (01-23-25 @ 08:29):    Growth in anaerobic bottle: Gram Positive Cocci in Pairs and Chains  Preliminary Report (01-23-25 @ 22:48):    Growth in anaerobic bottle: Enterococcus faecium    Direct identification is available within approximately 3-5    hours either by Blood Panel Multiplexed PCR or Direct    MALDI-TOF. Details: [email or web address]  Organism: Blood Culture PCR (01-23-25 @ 09:57)  Organism: Blood Culture PCR (01-23-25 @ 09:57)      Method Type: PCR      -  Enterococcus faecium,VRE: Detec    Culture - Bronchial (collected 01-20-25 @ 13:34)  Source: Bronchial  Gram Stain (01-21-25 @ 00:06):    Moderate polymorphonuclear leukocytes per low power field    Rare Squamous epithelial cells per low power field    No organisms seen per oil power field  Final Report (01-22-25 @ 07:44):    Commensal manjit consistent with body site    Culture - Bronchial (collected 01-18-25 @ 05:00)  Source: Bronchial  Gram Stain (01-18-25 @ 20:00):    Few polymorphonuclear leukocytes seen per low power field    No Squamous epithelial cells seen per low power field    No organisms seen per oil power field  Final Report (01-19-25 @ 22:41):    Commensal manjit consistent with body site    1/11 Bronchial AFB (-)  1/10 Bronchial cx (-)  1/9 BC (-)  1/7 BC (-)  1/5 BC (-)  1/3 Peritoneal Fungal, bacterial cx (-)  1/3 Bronchial AFB (-)  1/3 Bronchial cx (+) Numerous Escherichia coli ESBL  1/2 BC (+) Escherichia coli ESBL  1/2 Sputum cx (+) Escherichia coli ESBL    RADIOLOGY:  Xray Chest 1 View- PORTABLE-Routine (Xray Chest 1 View- PORTABLE-Routine in AM.) (01.24.25 @ 03:01) >  IMPRESSION: Postoperative lines and tubes described above. Cardiomegaly. Small left pleural effusion and/or left basilar atelectasis.    CT Chest w/ IV Cont (01.23.25 @ 12:34) >  IMPRESSION:  CHEST:  *  Near complete consolidation of the left lower lobe with volume loss and partial consolidation of the left upper lobe volume loss, suspect a combination of atelectasis and airspace disease. Groundglass opacities throughout the right lung and within nonconsolidated portions of the left   lung of uncertain etiology, possibly representing pulmonary edema or an infectious/inflammatory process.  *  Small bilateral pleural effusions, increased since 1/10/2025 with new loculation on the left.  *  Increased small to moderate pericardial effusion.  *  Redemonstrated nonocclusive thrombus in the right internal jugular vein, which partially encompasses the right IJ catheter.    CT Abdomen and Pelvis w/ IV Cont (01.23.25 @ 12:34) >  IMPRESSION:  ABDOMEN AND PELVIS:  *  Moderate to large ascites, mildly decreased since 1/10/2025.  Ill-defined infiltration within the mesentery and omentum with questioned pelvic peritoneal nodule. Suggest correlation with fluid sampling.  *  Nonspecific subcutaneous infiltration overlying the lower sacrum and coccyx on a background of anasarca. Ill-defined fluid located posterior and inferior to the coccyx measuring approximately 2.4 x 1.8 cm, which may represent confluent edema versus phlegmon or developing collection.  *  Cystic structure in the gallbladder fossa measuring 2.0 cm, possibly representing a gallbladder remnant or residual cystic duct, with a small collection not excluded.    US Chest (01.20.25 @ 13:46) >  IMPRESSION:  Minimal left diaphragmatic respiratory motion is seen, which is decreased relative to the right.    CT Abdomen and Pelvis w/ IV Cont (01.10.25 @ 15:41) >  IMPRESSION: Scattered consolidation in the left lung most severe in the lower lobe.  Nonocclusive thrombus right internal jugular vein. Large volume ascites.      ECHO:  TTE W or WO Ultrasound Enhancing Agent (01.19.25 @ 09:06) >  CONCLUSIONS:   1. Technically difficult image quality.   2. No pericardial effusion seen.

## 2025-01-24 NOTE — PROGRESS NOTE ADULT - SUBJECTIVE AND OBJECTIVE BOX
Patient seen and examined at the bedside.    Remains critically ill on continuous ICU monitoring, at risk for life threatening decompensation.  All Labs, data reviewed. Plan of care discussed in length during multi-disciplinary ICU rounds.       Brief Summary:  53 yo M with multiple medical problems including CAD s/p PCI in April 2024 s/p CABG x 3 on 12/30/24  1/2: Intubated for hypoxia & left lung white out s/p awake bronch with removal of copious mucopurulent secretions  1/4: s/p IABP insertion, sepsis/septic shock due to E coli   ESBL pneumonia, collapsed Left Lung, s/p multiple bronch and tracheostomy tube placement 1/18    24 Hour events:  Blood culture positive for VRI, Line changed  Linezolid started, ID following  CT scan positive for possible abscess    Objective:  Vital Signs Last 24 Hrs  T(C): 36.8 (24 Jan 2025 04:00), Max: 36.9 (23 Jan 2025 08:00)  T(F): 98.3 (24 Jan 2025 04:00), Max: 98.5 (23 Jan 2025 08:00)  HR: 80 (24 Jan 2025 05:18) (57 - 89)  BP: 149/74 (24 Jan 2025 04:00) (102/57 - 158/78)  BP(mean): 106 (24 Jan 2025 04:00) (64 - 112)  RR: 22 (24 Jan 2025 04:00) (14 - 32)  SpO2: 100% (24 Jan 2025 05:18) (97% - 100%)    Parameters below as of 24 Jan 2025 05:18  Patient On (Oxygen Delivery Method): ventilator    Mode: standby  FiO2: 50    Physical Exam:  General: Alert and interactive,   Neurology: Oriented, following commands. ambulates  Respiratory: Breath sounds bilaterally, trach collar  CV: Sinus  Abdominal: Soft, Nontender  Extremities: Warm, well-perfused  Right arm tender, but stable  -------------------------------------------------------------------------------------------------------------------------------    Labs:                        9.4    10.07 )-----------( 317      ( 24 Jan 2025 00:26 )             29.6     01-24    134[L]  |  96  |  38[H]  ----------------------------<  88  3.9   |  23  |  5.64[H]    Ca    8.4      24 Jan 2025 00:26  Phos  3.5     01-24  Mg     2.2     01-24    TPro  5.6[L]  /  Alb  2.7[L]  /  TBili  0.5  /  DBili  x   /  AST  19  /  ALT  10  /  AlkPhos  120  01-24    LIVER FUNCTIONS - ( 24 Jan 2025 00:26 )  Alb: 2.7 g/dL / Pro: 5.6 g/dL / ALK PHOS: 120 U/L / ALT: 10 U/L / AST: 19 U/L / GGT: x           PT/INR - ( 23 Jan 2025 00:33 )   PT: 15.8 sec;   INR: 1.39 ratio       PTT - ( 23 Jan 2025 00:33 )  PTT:33.5 sec    ALL MEDICATIONS   MEDICATIONS  (STANDING):  albuterol    0.083% 2.5 milliGRAM(s) Nebulizer every 6 hours  aMIOdarone    Tablet   Oral   aMIOdarone    Tablet 200 milliGRAM(s) Oral daily  ascorbic acid 500 milliGRAM(s) Oral daily  aspirin  chewable 81 milliGRAM(s) Oral daily  atorvastatin 80 milliGRAM(s) Oral at bedtime  bisacodyl Suppository 10 milliGRAM(s) Rectal once  chlorhexidine 0.12% Liquid 15 milliLiter(s) Oral Mucosa every 12 hours  chlorhexidine 2% Cloths 1 Application(s) Topical daily  chlorhexidine 4% Liquid 1 Application(s) Topical <User Schedule>  dextrose 50% Injectable 50 milliLiter(s) IV Push every 15 minutes  epoetin ignacia (EPOGEN) Injectable 83413 Unit(s) IV Push <User Schedule>  gabapentin 100 milliGRAM(s) Oral every 12 hours  heparin   Injectable 5000 Unit(s) SubCutaneous every 8 hours  insulin lispro (ADMELOG) corrective regimen sliding scale   SubCutaneous every 6 hours  linezolid  IVPB      linezolid  IVPB 600 milliGRAM(s) IV Intermittent every 12 hours  meropenem  IVPB      meropenem  IVPB 500 milliGRAM(s) IV Intermittent every 24 hours  methocarbamol 500 milliGRAM(s) Oral every 8 hours  mirtazapine 7.5 milliGRAM(s) Oral daily  Nephro-elicia 1 Tablet(s) Oral daily  pantoprazole  Injectable 40 milliGRAM(s) IV Push daily  polyethylene glycol 3350 17 Gram(s) Oral two times a day  sodium chloride 0.65% Nasal 1 Spray(s) Both Nostrils every 12 hours  sodium chloride 0.9%. 1000 milliLiter(s) (10 mL/Hr) IV Continuous <Continuous>  traZODone 100 milliGRAM(s) Oral at bedtime    MEDICATIONS  (PRN):  acetaminophen     Tablet .. 650 milliGRAM(s) Oral every 6 hours PRN Mild Pain (1 - 3)  benzocaine 20% Spray 1 Spray(s) Topical every 6 hours PRN throat pain  lidocaine/prilocaine Cream 1 Application(s) Topical daily PRN pre-HD  sodium chloride 0.9% lock flush 10 milliLiter(s) IV Push every 1 hour PRN Pre/post blood products, medications, blood draw, and to maintain line patency  ------------------------------------------------------------------------------------------------------------------------------  Assessment:  53 yo M s/p CABG x 3 on 12/30/24    Postop acute respiratory failure  Acute blood loss anemia  Ascites    ESRD on iHD   E coli bacteremia 2/2 pneumonia    Plan:  ***Neuro***  Postoperative acute pain control with Gabapentin and prns.  Gabapentin robaxin, Tylenol for pain  PT ongoing    ***Cardiovascular***  s/p CABG x 3  A fib s/p cardioversion - Amiodarone daily.  ASA / Statin daily    ***Pulmonary***  Postoperative acute respiratory failure  Tracheostomy 1/18   s/p Bronchoscopy 1/18. 1/20, 1/21  Left lung PNA, + e coli  Tolerates trach collar, keep on a vent at night  on HT 3% nebs and albuterol    ***GI***  On Tube feeds at goal  Protonix for stress ulcer prophylaxis.   Ascites: Last paracentesis 1/11    ***Renal***  ESRD - last HD 1/17: -2.5L UF (MWF)  Nephro-Elicai for renal support    ***ID***   E. coli PNA -  Meropenem. inhaled mary completed  New abscess  in right buttocks and coccygeal area  Meropenum re-started  Consult surgery for possible I &D  VRE bacteremia, started on linezolid    ***Endocrine***  Hyperglycemia - Insulin sliding scale    ***Hematology***  Acute blood loss anemia  CBC, coags  Continue  Epogen.  Heparin SQ for DVT        I, Lillie Kingsley MD, personally performed the services described in this documentation. All medical record entries made by the scribe were at my direction and in my presence. I have reviewed the chart and agree that the record reflects my personal performance and is accurate and complete  Electronically signed:   Lillie Kingsley MD  CT ICU attending     ICU time: ** mins     By signing my name below, I, Baldemar Hernandez, attest that this documentation has been prepared under the direction and in the presence of Lillie Kingsley MD  Electronically signed: Baldemar Hernandez, 01-24-25 @ 06:33             Patient seen and examined at the bedside.    Remains critically ill on continuous ICU monitoring, at risk for life threatening decompensation.  All Labs, data reviewed. Plan of care discussed in length during multi-disciplinary ICU rounds.       Brief Summary:  53 yo M with multiple medical problems including CAD s/p PCI in April 2024 s/p CABG x 3 on 12/30/24  1/2: Intubated for hypoxia & left lung white out s/p awake bronch with removal of copious mucopurulent secretions  1/4: s/p IABP insertion, sepsis/septic shock due to E coli   ESBL pneumonia, collapsed Left Lung, s/p multiple bronch and tracheostomy tube placement 1/18    24 Hour events:  HD today  Bronch today  Continue antibiotics  Decreased Trazadone to 50mg  Change TFs to vital per nutrition consult  Upper quadrant ultrasound to assess ascites    Objective:  Vital Signs Last 24 Hrs  T(C): 36.8 (24 Jan 2025 04:00), Max: 36.9 (23 Jan 2025 08:00)  T(F): 98.3 (24 Jan 2025 04:00), Max: 98.5 (23 Jan 2025 08:00)  HR: 80 (24 Jan 2025 05:18) (57 - 89)  BP: 149/74 (24 Jan 2025 04:00) (102/57 - 158/78)  BP(mean): 106 (24 Jan 2025 04:00) (64 - 112)  RR: 22 (24 Jan 2025 04:00) (14 - 32)  SpO2: 100% (24 Jan 2025 05:18) (97% - 100%)    Parameters below as of 24 Jan 2025 05:18  Patient On (Oxygen Delivery Method): ventilator    Mode: standby  FiO2: 50    Physical Exam:  General: Alert and interactive,   Neurology: Oriented, following commands. ambulates  Respiratory: Breath sounds bilaterally, trach collar  CV: Sinus  Abdominal: Soft, Nontender  Extremities: Warm, well-perfused  Right arm tender, but stable  -------------------------------------------------------------------------------------------------------------------------------    Labs:                        9.4    10.07 )-----------( 317      ( 24 Jan 2025 00:26 )             29.6     01-24    134[L]  |  96  |  38[H]  ----------------------------<  88  3.9   |  23  |  5.64[H]    Ca    8.4      24 Jan 2025 00:26  Phos  3.5     01-24  Mg     2.2     01-24    TPro  5.6[L]  /  Alb  2.7[L]  /  TBili  0.5  /  DBili  x   /  AST  19  /  ALT  10  /  AlkPhos  120  01-24    LIVER FUNCTIONS - ( 24 Jan 2025 00:26 )  Alb: 2.7 g/dL / Pro: 5.6 g/dL / ALK PHOS: 120 U/L / ALT: 10 U/L / AST: 19 U/L / GGT: x           PT/INR - ( 23 Jan 2025 00:33 )   PT: 15.8 sec;   INR: 1.39 ratio       PTT - ( 23 Jan 2025 00:33 )  PTT:33.5 sec    CT Abdomen and Pelvis w/ IV Cont (01.23.25 @ 12:34)  CHEST:  *  Near complete consolidation of the left lower lobe with volume loss   and partial consolidation of the left upper lobe volume loss, suspect a   combination of atelectasis and airspace disease. Groundglass opacities   throughout the right lung and within nonconsolidated portions of the left   lung of uncertain etiology, possibly representing pulmonary edema or an   infectious/inflammatory process.  *  Small bilateral pleural effusions, increased since 1/10/2025 with new   loculation on the left.  *  Increased small to moderate pericardial effusion.  *  Redemonstrated nonocclusive thrombus in the right internal jugular   vein, which partially encompasses the right IJ catheter.    ABDOMEN AND PELVIS:  *  Moderate to large ascites, mildly decreased since 1/10/2025.   Ill-defined infiltration within the mesentery and omentum with questioned   pelvic peritoneal nodule. Suggest correlation with fluid sampling.  *  Nonspecific subcutaneous infiltration overlying the lower sacrum and   coccyx on a background of anasarca. Ill-defined fluid located posterior   and inferior to the coccyx measuring approximately 2.4 x 1.8 cm, which   may represent confluent edema versus phlegmon or developing collection.  *  Cystic structure in the gallbladder fossa measuring 2.0 cm, possibly   representing a gallbladder remnant or residual cystic duct, with a small   collection not excluded.      ALL MEDICATIONS   MEDICATIONS  (STANDING):  albuterol    0.083% 2.5 milliGRAM(s) Nebulizer every 6 hours  aMIOdarone    Tablet   Oral   aMIOdarone    Tablet 200 milliGRAM(s) Oral daily  ascorbic acid 500 milliGRAM(s) Oral daily  aspirin  chewable 81 milliGRAM(s) Oral daily  atorvastatin 80 milliGRAM(s) Oral at bedtime  bisacodyl Suppository 10 milliGRAM(s) Rectal once  chlorhexidine 0.12% Liquid 15 milliLiter(s) Oral Mucosa every 12 hours  chlorhexidine 2% Cloths 1 Application(s) Topical daily  chlorhexidine 4% Liquid 1 Application(s) Topical <User Schedule>  dextrose 50% Injectable 50 milliLiter(s) IV Push every 15 minutes  epoetin ignacia (EPOGEN) Injectable 67632 Unit(s) IV Push <User Schedule>  gabapentin 100 milliGRAM(s) Oral every 12 hours  heparin   Injectable 5000 Unit(s) SubCutaneous every 8 hours  insulin lispro (ADMELOG) corrective regimen sliding scale   SubCutaneous every 6 hours  linezolid  IVPB      linezolid  IVPB 600 milliGRAM(s) IV Intermittent every 12 hours  meropenem  IVPB      meropenem  IVPB 500 milliGRAM(s) IV Intermittent every 24 hours  methocarbamol 500 milliGRAM(s) Oral every 8 hours  mirtazapine 7.5 milliGRAM(s) Oral daily  Nephro-elicia 1 Tablet(s) Oral daily  pantoprazole  Injectable 40 milliGRAM(s) IV Push daily  polyethylene glycol 3350 17 Gram(s) Oral two times a day  sodium chloride 0.65% Nasal 1 Spray(s) Both Nostrils every 12 hours  sodium chloride 0.9%. 1000 milliLiter(s) (10 mL/Hr) IV Continuous <Continuous>  traZODone 100 milliGRAM(s) Oral at bedtime    MEDICATIONS  (PRN):  acetaminophen     Tablet .. 650 milliGRAM(s) Oral every 6 hours PRN Mild Pain (1 - 3)  benzocaine 20% Spray 1 Spray(s) Topical every 6 hours PRN throat pain  lidocaine/prilocaine Cream 1 Application(s) Topical daily PRN pre-HD  sodium chloride 0.9% lock flush 10 milliLiter(s) IV Push every 1 hour PRN Pre/post blood products, medications, blood draw, and to maintain line patency  ------------------------------------------------------------------------------------------------------------------------------  Assessment:  53 yo M s/p CABG x 3 on 12/30/24    Postop acute respiratory failure  Acute blood loss anemia  Ascites    ESRD on iHD   E coli bacteremia 2/2 pneumonia    Plan:  ***Neuro***  Postoperative acute pain control with Gabapentin and prns.  Gabapentin, robaxin, Tylenol for pain  PT ongoing    ***Cardiovascular***  s/p CABG x 3  A fib s/p cardioversion - Amiodarone daily.  ASA / Statin daily    ***Pulmonary***  Postoperative acute respiratory failure  Tracheostomy 1/18   s/p Bronchoscopy 1/18. 1/20, 1/21, 1/24  Left lung PNA, + e coli  Tolerates trach collar, keep on a vent at night  on HT 3% nebs and albuterol    ***GI***  On Tube feeds at goal, change to vital per nutrition consult  Protonix for stress ulcer prophylaxis.   Ascites: Last paracentesis 1/11  Upper quadrant ultrasound to assess ascites    ***Renal***  ESRD - last HD 1/17: -2.5L UF (MWF)  Nephro-Elicia for renal support    ***ID***   E. coli PNA -  Meropenem. inhaled mary completed  New abscess  in right buttocks and coccygeal area  Meropenum re-started  Consult surgery for possible I &D  VRE bacteremia, started on linezolid    ***Endocrine***  Hyperglycemia - Insulin sliding scale    ***Hematology***  Acute blood loss anemia  CBC, coags  Continue  Epogen.  Heparin SQ for DVT        I, Lillie Kingsley MD, personally performed the services described in this documentation. All medical record entries made by the scribe were at my direction and in my presence. I have reviewed the chart and agree that the record reflects my personal performance and is accurate and complete  Electronically signed:   Lillie Kingsley MD  CT ICU attending     ICU time: ** mins     By signing my name below, I, Baldemar Hernandez, attest that this documentation has been prepared under the direction and in the presence of Lillie Kingsley MD  Electronically signed: Baldemar Hernandez, 01-24-25 @ 06:33             Patient seen and examined at the bedside.    Remains critically ill on continuous ICU monitoring, at risk for life threatening decompensation.  All Labs, data reviewed. Plan of care discussed in length during multi-disciplinary ICU rounds.     Brief Summary:  53 yo M with multiple medical problems including CAD s/p PCI in April 2024 s/p CABG x 3 on 12/30/24  1/2: Intubated for hypoxia & left lung white out s/p awake bronch with removal of copious mucopurulent secretions  1/4: s/p IABP insertion, sepsis/septic shock due to E coli   ESBL pneumonia, collapsed Left Lung, s/p multiple bronch and tracheostomy tube placement 1/18    24 Hour events:  HD session  Bronchoscopy , follow BAL  secretion in Left low lobe min  Continue antibiotics  Decreased Trazadone to 50mg  Change TFs to vital per nutrition consult  Upper quadrant ultrasound to assess ascites    Objective:  Vital Signs Last 24 Hrs  T(C): 36.8 (24 Jan 2025 04:00), Max: 36.9 (23 Jan 2025 08:00)  T(F): 98.3 (24 Jan 2025 04:00), Max: 98.5 (23 Jan 2025 08:00)  HR: 80 (24 Jan 2025 05:18) (57 - 89)  BP: 149/74 (24 Jan 2025 04:00) (102/57 - 158/78)  BP(mean): 106 (24 Jan 2025 04:00) (64 - 112)  RR: 22 (24 Jan 2025 04:00) (14 - 32)  SpO2: 100% (24 Jan 2025 05:18) (97% - 100%)    Parameters below as of 24 Jan 2025 05:18  Patient On (Oxygen Delivery Method): ventilator    Mode: standby  FiO2: 50    Physical Exam:  General: Alert and interactive,   Neurology: Oriented, following commands. ambulates  Respiratory: Breath sounds bilaterally, trach collar  CV: Sinus  Abdominal: Soft, Nontender  Extremities: Warm, well-perfused  Right arm tender, but stable  -------------------------------------------------------------------------------------------------------------------------------    Labs:                        9.4    10.07 )-----------( 317      ( 24 Jan 2025 00:26 )             29.6     01-24    134[L]  |  96  |  38[H]  ----------------------------<  88  3.9   |  23  |  5.64[H]    Ca    8.4      24 Jan 2025 00:26  Phos  3.5     01-24  Mg     2.2     01-24    TPro  5.6[L]  /  Alb  2.7[L]  /  TBili  0.5  /  DBili  x   /  AST  19  /  ALT  10  /  AlkPhos  120  01-24    LIVER FUNCTIONS - ( 24 Jan 2025 00:26 )  Alb: 2.7 g/dL / Pro: 5.6 g/dL / ALK PHOS: 120 U/L / ALT: 10 U/L / AST: 19 U/L / GGT: x           PT/INR - ( 23 Jan 2025 00:33 )   PT: 15.8 sec;   INR: 1.39 ratio       PTT - ( 23 Jan 2025 00:33 )  PTT:33.5 sec    CT Abdomen and Pelvis w/ IV Cont (01.23.25 @ 12:34)  CHEST:  *  Near complete consolidation of the left lower lobe with volume loss   and partial consolidation of the left upper lobe volume loss, suspect a   combination of atelectasis and airspace disease. Groundglass opacities   throughout the right lung and within nonconsolidated portions of the left   lung of uncertain etiology, possibly representing pulmonary edema or an   infectious/inflammatory process.  *  Small bilateral pleural effusions, increased since 1/10/2025 with new   loculation on the left.  *  Increased small to moderate pericardial effusion.  *  Redemonstrated nonocclusive thrombus in the right internal jugular   vein, which partially encompasses the right IJ catheter.    ABDOMEN AND PELVIS:  *  Moderate to large ascites, mildly decreased since 1/10/2025.   Ill-defined infiltration within the mesentery and omentum with questioned   pelvic peritoneal nodule. Suggest correlation with fluid sampling.  *  Nonspecific subcutaneous infiltration overlying the lower sacrum and   coccyx on a background of anasarca. Ill-defined fluid located posterior   and inferior to the coccyx measuring approximately 2.4 x 1.8 cm, which   may represent confluent edema versus phlegmon or developing collection.  *  Cystic structure in the gallbladder fossa measuring 2.0 cm, possibly   representing a gallbladder remnant or residual cystic duct, with a small   collection not excluded.      ALL MEDICATIONS   MEDICATIONS  (STANDING):  albuterol    0.083% 2.5 milliGRAM(s) Nebulizer every 6 hours  aMIOdarone    Tablet   Oral   aMIOdarone    Tablet 200 milliGRAM(s) Oral daily  ascorbic acid 500 milliGRAM(s) Oral daily  aspirin  chewable 81 milliGRAM(s) Oral daily  atorvastatin 80 milliGRAM(s) Oral at bedtime  bisacodyl Suppository 10 milliGRAM(s) Rectal once  chlorhexidine 0.12% Liquid 15 milliLiter(s) Oral Mucosa every 12 hours  chlorhexidine 2% Cloths 1 Application(s) Topical daily  chlorhexidine 4% Liquid 1 Application(s) Topical <User Schedule>  dextrose 50% Injectable 50 milliLiter(s) IV Push every 15 minutes  epoetin ignacia (EPOGEN) Injectable 82146 Unit(s) IV Push <User Schedule>  gabapentin 100 milliGRAM(s) Oral every 12 hours  heparin   Injectable 5000 Unit(s) SubCutaneous every 8 hours  insulin lispro (ADMELOG) corrective regimen sliding scale   SubCutaneous every 6 hours  linezolid  IVPB      linezolid  IVPB 600 milliGRAM(s) IV Intermittent every 12 hours  meropenem  IVPB      meropenem  IVPB 500 milliGRAM(s) IV Intermittent every 24 hours  methocarbamol 500 milliGRAM(s) Oral every 8 hours  mirtazapine 7.5 milliGRAM(s) Oral daily  Nephro-elicia 1 Tablet(s) Oral daily  pantoprazole  Injectable 40 milliGRAM(s) IV Push daily  polyethylene glycol 3350 17 Gram(s) Oral two times a day  sodium chloride 0.65% Nasal 1 Spray(s) Both Nostrils every 12 hours  sodium chloride 0.9%. 1000 milliLiter(s) (10 mL/Hr) IV Continuous <Continuous>  traZODone 100 milliGRAM(s) Oral at bedtime    MEDICATIONS  (PRN):  acetaminophen     Tablet .. 650 milliGRAM(s) Oral every 6 hours PRN Mild Pain (1 - 3)  benzocaine 20% Spray 1 Spray(s) Topical every 6 hours PRN throat pain  lidocaine/prilocaine Cream 1 Application(s) Topical daily PRN pre-HD  sodium chloride 0.9% lock flush 10 milliLiter(s) IV Push every 1 hour PRN Pre/post blood products, medications, blood draw, and to maintain line patency  ------------------------------------------------------------------------------------------------------------------------------  Assessment:  53 yo M s/p CABG x 3 on 12/30/24    Postop acute respiratory failure  Acute blood loss anemia  Ascites    ESRD on iHD   E coli bacteremia 2/2 pneumonia    Plan:  ***Neuro***  Postoperative acute pain control with Gabapentin and prns.  Gabapentin, robaxin, Tylenol for pain  PT ongoing    ***Cardiovascular***  s/p CABG x 3  A fib s/p cardioversion - Amiodarone daily.  ASA / Statin daily    ***Pulmonary***  Postoperative acute respiratory failure  Tracheostomy 1/18   s/p Bronchoscopy 1/18. 1/20, 1/21, 1/24  Secretion in Left lung improving  Restart Mucomyst albuterol  Left lung PNA, + e coli  Tolerates trach collar, keep on a vent at night  on HT 3% nebs and albuterol    ***GI***  On Tube feeds at goal, change to vital per nutrition consult  Protonix for stress ulcer prophylaxis.   Ascites: Last paracentesis 1/11  Upper quadrant ultrasound to assess ascites    ***Renal***  ESRD - last HD 1/17: -2.5L UF (MWF)  Nephro-Elicia for renal support    ***ID***   E. coli PNA -  Meropenem. inhaled mary completed  New abscess in right buttocks and coccygeal area  Meropenum re-started, amikacin single dose  Consult surgery for possible I &D  VRE bacteremia,  on linezolid  Monitor cultures    ***Endocrine***  Hyperglycemia - Insulin sliding scale    ***Hematology***  Acute blood loss anemia  CBC, coags  Continue  Epogen.  Heparin SQ for DVT      ILillie MD, personally performed the services described in this documentation. All medical record entries made by the scribe were at my direction and in my presence. I have reviewed the chart and agree that the record reflects my personal performance and is accurate and complete  Electronically signed:   Lillie Kingsley MD  CT ICU attending     ICU time:  50mins     By signing my name below, I, Baldemar Hernandez, attest that this documentation has been prepared under the direction and in the presence of Lillie Kingsley MD  Electronically signed: Baldemar Hernandez, 01-24-25 @ 06:33

## 2025-01-24 NOTE — CONSULT NOTE ADULT - SUBJECTIVE AND OBJECTIVE BOX
CHIEF COMPLAINT: Patient is a 54y old  Male who presents with a chief complaint of s/p CABG x 3 (24 Jan 2025 06:33)    HPI:  54M with PMH of HTN, HLD, ESRD on HD MWF via LUE AVF, ascites (requiring repeat paracenteses q4-6wks), NICM with moderate LV dysfunction w/ EF 35-40%(2023) and CAD s/p atherectomy + SALLIE to D1(4/11/2024) presents to [facility] transferred from [facility]. Patient initially presented to [facility] ED with shortness of breath. Of note he was recently admitted from 09/27-12/02 for acute cholecystitis s/p cholecystectomy. In [facility] ED, pt was hypoxic to 86% on RA, trop 1.7K, EKG no new changes, CXR fluid overload. Admitted for AHRF 2/2 fluid overload. Pt received HD on 12/18, 12/19, 12/20 and 12/22. DAPT was resumed, TTE showed improvement in LVEF to 40-45%. Stress test was abnormal with 1mm inferior STD, reversible ischemia in the inferior wall and fixed defects in the lateral, anterior and apical walls with post stress EF of 24%. Pt was then transferred to [facility] for cardiac cath which showed pLAD 70% ISR, mLCx 70%, D1 severe dz. Patient now transferred to [facility] CTS Dr. Rivers for CABG eval, s/p CABG x 3 on 12/30/24 w/ post op course complicated by reintubation for hypoxia found to have ESBL pneumonia, collapsed left lung, s/p IABP insertion for septic shock due to E coli, s/p multiple bronch and tracheostomy tube placement. c/b a sacral pressure wound. Hepatology was consulted for ascites c/f cirrhosis.     PAST MEDICAL & SURGICAL HISTORY:  Type 2 diabetes mellitus  Hypertension  End stage renal disease  started HD 2/2019 T, Th, Sat via right chest permacath  Bone spur  right shoulder- hx of - sx done  Anemia  Injury of right wrist  hx of at age 14  History of hyperkalemia  before HD- K-6.2 - to repeat in am of sx, pt had HD today  S/P arthroscopy of right shoulder  2010  History of vascular access device  right chest permacath - 2/2019  H/O right wrist surgery  tendons repair - at age 14  AV fistula  H/O ventral hernia repair  S/P cholecystectomy    FAMILY HISTORY:  FH: hypertension  mother, father- alive    FH: type 2 diabetes  mother, father- alive    SOCIAL HISTORY:  Smoking:   Substance Use:   EtOH Use:   Advance Directives:     MEDICATIONS  (STANDING):  albuterol    0.083% 2.5 milliGRAM(s) Nebulizer every 6 hours  aMIOdarone    Tablet   Oral   aMIOdarone    Tablet 200 milliGRAM(s) Oral daily  ascorbic acid 500 milliGRAM(s) Oral daily  aspirin  chewable 81 milliGRAM(s) Oral daily  atorvastatin 80 milliGRAM(s) Oral at bedtime  bisacodyl Suppository 10 milliGRAM(s) Rectal once  chlorhexidine 0.12% Liquid 15 milliLiter(s) Oral Mucosa every 12 hours  chlorhexidine 2% Cloths 1 Application(s) Topical daily  chlorhexidine 4% Liquid 1 Application(s) Topical <User Schedule>  dextrose 50% Injectable 50 milliLiter(s) IV Push every 15 minutes  epoetin ignacia (EPOGEN) Injectable 01355 Unit(s) IV Push <User Schedule>  fentaNYL    Injectable 50 MICROGram(s) IV Push once  gabapentin 100 milliGRAM(s) Oral every 12 hours  heparin   Injectable 5000 Unit(s) SubCutaneous every 8 hours  insulin lispro (ADMELOG) corrective regimen sliding scale   SubCutaneous every 6 hours  linezolid  IVPB      linezolid  IVPB 600 milliGRAM(s) IV Intermittent every 12 hours  meropenem  IVPB 500 milliGRAM(s) IV Intermittent every 24 hours  meropenem  IVPB      methocarbamol 500 milliGRAM(s) Oral every 8 hours  mirtazapine 7.5 milliGRAM(s) Oral daily  Nephro-elicia 1 Tablet(s) Oral daily  pantoprazole  Injectable 40 milliGRAM(s) IV Push daily  polyethylene glycol 3350 17 Gram(s) Oral two times a day  sodium chloride 0.65% Nasal 1 Spray(s) Both Nostrils every 12 hours  sodium chloride 0.9%. 1000 milliLiter(s) (10 mL/Hr) IV Continuous <Continuous>  traZODone 50 milliGRAM(s) Oral at bedtime    MEDICATIONS  (PRN):  acetaminophen     Tablet .. 650 milliGRAM(s) Oral every 6 hours PRN Mild Pain (1 - 3)  benzocaine 20% Spray 1 Spray(s) Topical every 6 hours PRN throat pain  lidocaine/prilocaine Cream 1 Application(s) Topical daily PRN pre-HD  sodium chloride 0.9% lock flush 10 milliLiter(s) IV Push every 1 hour PRN Pre/post blood products, medications, blood draw, and to maintain line patency      Allergies  No Known Allergies  Intolerances    OBJECTIVE:  ICU Vital Signs Last 24 Hrs  T(C): 36.8 (24 Jan 2025 08:00), Max: 36.9 (23 Jan 2025 20:00)  T(F): 98.2 (24 Jan 2025 08:00), Max: 98.5 (23 Jan 2025 20:00)  HR: 83 (24 Jan 2025 10:00) (57 - 91)  BP: 133/75 (24 Jan 2025 10:00) (102/57 - 158/78)  BP(mean): 85 (24 Jan 2025 10:00) (77 - 112)  ABP: --  ABP(mean): --  RR: 15 (24 Jan 2025 10:00) (15 - 45)  SpO2: 100% (24 Jan 2025 10:00) (96% - 100%)    O2 Parameters below as of 24 Jan 2025 08:00  Patient On (Oxygen Delivery Method): tracheostomy collar  O2 Flow (L/min): 40  O2 Concentration (%): 40      Mode: HFTC, FiO2: 40    01-23 @ 07:01 - 01-24 @ 07:00  --------------------------------------------------------  IN: 2230 mL / OUT: 700 mL / NET: 1530 mL    01-24 @ 07:01  - 01-24 @ 10:34  --------------------------------------------------------  IN: 5 mL / OUT: 0 mL / NET: 5 mL      CAPILLARY BLOOD GLUCOSE  98 (24 Jan 2025 05:00)      POCT Blood Glucose.: 98 mg/dL (24 Jan 2025 05:33)      PHYSICAL EXAM:  GENERAL: NAD, sitting up   HEAD:  Atraumatic, normocephalic  EYES: EOMI, PERRLA, conjunctiva and sclera clear  ENT: Moist mucous membranes  NECK: Supple, no JVD  HEART: faint S1, S2, regular rate and rhythm, no murmurs, rubs, or gallops  LUNGS: Unlabored respirations with trache.  Left side breath sound diminished  ABDOMEN: Soft, nontender, nondistended, +BS  EXTREMITIES: 2+ peripheral pulses bilaterally. No clubbing, cyanosis, or edema  NERVOUS SYSTEM:  A&Ox3, no focal deficits   SKIN: No rashes or lesions  LINES:     LABS:                        9.4    10.07 )-----------( 317      ( 24 Jan 2025 00:26 )             29.6     Hgb Trend: 9.4<--, 9.7<--, 9.8<--, 9.5<--, 9.6<--  01-24    134[L]  |  96  |  38[H]  ----------------------------<  88  3.9   |  23  |  5.64[H]    Ca    8.4      24 Jan 2025 00:26  Phos  3.5     01-24  Mg     2.2     01-24    TPro  5.6[L]  /  Alb  2.7[L]  /  TBili  0.5  /  DBili  x   /  AST  19  /  ALT  10  /  AlkPhos  120  01-24    Creatinine Trend: 5.64<--, 4.69<--, 5.89<--, 4.86<--, 4.80<--, 6.64<--  PT/INR - ( 23 Jan 2025 00:33 )   PT: 15.8 sec;   INR: 1.39 ratio         PTT - ( 23 Jan 2025 00:33 )  PTT:33.5 sec  Urinalysis Basic - ( 24 Jan 2025 00:26 )    Color: x / Appearance: x / SG: x / pH: x  Gluc: 88 mg/dL / Ketone: x  / Bili: x / Urobili: x   Blood: x / Protein: x / Nitrite: x   Leuk Esterase: x / RBC: x / WBC x   Sq Epi: x / Non Sq Epi: x / Bacteria: x        Venous Blood Gas:  01-23 @ 00:10  7.58/29/40/27/76.1  VBG Lactate: 1.0  Venous Blood Gas:  01-22 @ 13:45  7.38/50/57/30/87.6  VBG Lactate: --  Venous Blood Gas:  01-22 @ 13:12  7.42/44/97/28/99.4  VBG Lactate: 0.7      MICROBIOLOGY:     RADIOLOGY & ADDITIONAL TESTS:  < from: TTE W or WO Ultrasound Enhancing Agent (01.03.25 @ 11:09) >  CONCLUSIONS:      1. Left ventricular systolic function is moderately to severely decreased with anejection fraction of 35 % by 3D. Global left ventricular hypokinesis.   2. Enlarged right ventricular cavity size and moderately to severely reduced right ventricular systolic function.   3. Severe tricuspid regurgitation.   4. Compared to the transthoracic echocardiogram performed on 1/3/2025, there have been no significant interval changes.    ________________________________________________________________________________________  FINDINGS:     Left Ventricle:  The left ventricular cavity is normal in size. Left ventricular systolic function is moderately to severely decreased with a calculated ejection fraction of 35 % by 3D. There is global left ventricular hypokinesis. There is increased LV mass and concentric hypertrophy.     Right Ventricle:  The right ventricular cavity is enlarged in size and right ventricular systolic function is moderately to severely reduced. Tricuspid annular plane systolic excursion (TAPSE) is 0.8 cm (normal >=1.7 cm).     Aortic Valve:  There is no aortic valve stenosis. There is trace aortic regurgitation.     Mitral Valve:  There is calcification of the mitral valve annulus. There is no mitral valve stenosis. There is trace mitral regurgitation.     Tricuspid Valve:  There is severe tricuspid regurgitation. Estimated pulmonary artery systolic pressure is 37 mmHg.     Pulmonic Valve:  There is mild to moderate pulmonic regurgitation.    < end of copied text >    < from: CT Abdomen and Pelvis w/ IV Cont (01.23.25 @ 12:34) >  FINDINGS:  CHEST:  LUNGS AND LARGE AIRWAYS: Tracheostomy tube. Small amount of debris within   the right mainstem bronchus. Near complete complete consolidation of the   left lower lobe with volume loss. Partial consolidation and volume loss   within the left upper lobe. Consolidative opacities in the posterior   dependent right lower lobe, favor atelectasis. Groundglass opacities   throughout the right lung with mosaic attenuation appearance, as well as   within the nonconsolidated portions of the left lung.  PLEURA: Small left pleural effusion, increased since 1/10/2025, with new   loculation along the posterior lung apex. Small right pleural effusion,   also increased.  VESSELS: Right IJ approach catheter with tip in the right atrium, newly   placed since the prior CT. Again seen is nonocclusive right IJ thrombus,   which partially encompasses the IJ catheter. Atherosclerotic changes.   Coronary artery calcifications and possibly stenting. CABG. Partially   imaged left upper extremity arteriovenous fistula.  HEART: Cardiomegaly. Epicardial lead. Small to moderate pericardial   effusion, increased.  MEDIASTINUM AND CASSANDRA: No significant change in prominent mediastinal   lymph nodes. For reference, a node to the left of the aortic arch   measures 1.6 x 1.1 cm (3:44)  CHEST WALL AND LOWER NECK: Subcutaneous edema. Median sternotomy.   Bilateral gynecomastia.    ABDOMEN AND PELVIS:  LIVER: Mild periportal edema.  BILE DUCTS: Normal caliber.  GALLBLADDER: The patient is status post cholecystectomy. Cystic structure   in the gallbladder fossa and lateral to the cholecystectomy clips   measuring 2.0 x 1.8 cm (3:153, 5:45), possibly reflecting a gallbladder   or cystic duct remnant. Limited comparison to the prior exam, which had   overlying streak artifact in this region.  SPLEEN: Within normal limits.  PANCREAS: Within normal limits.  ADRENALS: Within normal limits.  KIDNEYS/URETERS: Bilateral renal atrophy. No hydronephrosis.    BLADDER: Within normal limits.  REPRODUCTIVE ORGANS: Prostate within normal limits.    BOWEL: Enteric tube with tip in the distal stomach. No bowel obstruction.   Appendix is normal.  PERITONEUM/RETROPERITONEUM: Moderate to large volume of ascites, mildly   decreased since the prior CT. Ill-defined infiltration within the right   upper quadrant mesentery and within the omentum (3:165 and 3:175,   respectively). Question a subcentimeter peritoneal nodule versus debris   versus a prominent vessel in the right posterior dependent pelvis, to the   right of the rectum (3:280).  VESSELS: Atherosclerotic changes. Heavy calcification along the renal   arteries and proximal inferior mesenteric artery.  LYMPH NODES: No lymphadenopathy.  ABDOMINAL WALL: Postsurgical changes in the anterior abdominal wall with   mesh. Diffuse subcutaneous edema. Nonspecific subcutaneous infiltration   overlying the lower sacrum and coccyx on a background of anasarca.   Questionableill-defined or developing collection versus confluent edema   located posterior and inferior to the coccyx, measures approximately 2.4   x 1.8 cm (3:315). No soft tissue gas.  BONES: Degenerative changes.    IMPRESSION:    CHEST:  *  Near complete consolidation of the left lower lobe with volume loss   and partial consolidation of the left upper lobe volume loss, suspect a   combination of atelectasis and airspace disease. Groundglass opacities   throughout the right lung and within nonconsolidated portions of the left   lung of uncertain etiology, possibly representing pulmonary edema or an   infectious/inflammatory process.  *  Small bilateral pleural effusions, increased since 1/10/2025 with new   loculation on the left.  *  Increased small to moderate pericardial effusion.  *  Redemonstrated nonocclusive thrombus in the right internal jugular   vein, which partially encompasses the right IJ catheter.    ABDOMEN AND PELVIS:  *  Moderate to large ascites, mildly decreased since 1/10/2025.   Ill-defined infiltration within the mesentery and omentum with questioned   pelvic peritoneal nodule. Suggest correlation with fluid sampling.  *  Nonspecific subcutaneous infiltration overlying the lower sacrum and   coccyx on a background of anasarca. Ill-defined fluid located posterior   and inferior to the coccyx measuring approximately 2.4 x 1.8 cm, which   may represent confluent edema versus phlegmon or developing collection.  *  Cystic structure in the gallbladder fossa measuring 2.0 cm, possibly   representing a gallbladder remnant or residual cystic duct, with a small   collection not excluded.        < end of copied text >

## 2025-01-24 NOTE — PROCEDURE NOTE - NSBRONCHFINDINGS_GEN_A_CORE_FT
Findings	  Position:   Supine  Intubation site:  Tracheostomy  INDICATION:  Pulmonary toilet/mucous plugging/BAL sampling/airway inspection.  FINDINGS:  Trachea: Clear  Main ema:   Sharp  Left Mainstem Bronchus: Moderate thick clear-yellow sputum  AUGUSTO:  Moderate thick clear-yellow sputum  LLL: Moderate thick clear-yellow sputum  Right Mainstem Bronchus:  Small amount of thick clear-yellow sputum  RUL: Scant clear mucous.  Rt bronchus intermedius: Clear  RML: Clear   RLL: Clear  SPECIMENS: BAL: 30cc in the LLL  Findings	  Position:   Supine  Intubation site:  Tracheostomy  INDICATION:  Pulmonary toilet/mucous plugging/BAL sampling/airway inspection.  FINDINGS:  Trachea: Clear  Main ema:   Sharp  Left Mainstem Bronchus: Moderate thick clear-yellow sputum.   AUGUSTO:  Moderate thick clear-yellow sputum  LLL: Moderate thick clear-yellow sputum. Erythematous and boggy bronchi.   Right Mainstem Bronchus:  Small amount of thick clear-yellow sputum  RUL: Scant clear mucous.  Rt bronchus intermedius: Clear  RML: Clear   RLL: Clear  SPECIMENS: BAL: 30cc in the LLL  Findings	  Position:   Supine  Intubation site:  Tracheostomy  INDICATION:  Pulmonary toilet/mucous plugging/BAL sampling/airway inspection.  FINDINGS:  Trachea: Clear  Main ema:   Sharp  Left Mainstem Bronchus: Moderate thick clear-yellow sputum.   AUGUSTO:  Mild clear-yellow sputum  LLL: Moderate-moderate thick clear-yellow sputum. Erythematous and boggy bronchi.   Right Mainstem Bronchus:  Small amount ofclear-yellow sputum  RUL: Scant clear mucous.  Rt bronchus intermedius: Clear  RML: Clear   RLL: Clear  SPECIMENS: BAL: 30cc in the LLL

## 2025-01-24 NOTE — PROGRESS NOTE ADULT - ASSESSMENT
Assessment  54 year old male with HTN, HLD, ESRD on HD, ascites, CHF, and CAD s/p PCI (4/2024) transferred to [facility] from [facility] for cardiac catheterization due to abnormal stress test. Cath showed multivessel disease now s/p CABG 12/30/24. Course c/b acute on chronic hypoxemic respiratory failure in setting of recurrent mucus plugging on left s/p multiple bronchoscopies.      Recommendations  - C/w  Aggressive pulmonary toilet  - duonebs q6h  - 3% saline nebs q6h  - chest pt   - C/w IPV/Volara  - Bronchoscopy today with improving secretions  - C/w bronch prn for secretion clearance  - c/w abx per CTU/ID  - All airways patent, no indication for airway stenting at this time.     transplant pulm to follow

## 2025-01-24 NOTE — PROGRESS NOTE ADULT - SUBJECTIVE AND OBJECTIVE BOX
Interval Events:  Stable, awake and alert     REVIEW OF SYSTEMS:  Constitutional:   Eyes:  ENT:  CV:  Resp:  GI:  :  MSK:  Integumentary:  Neurological:  Psychiatric:  Endocrine:  Hematologic/Lymphatic:  Allergic/Immunologic:  [x] All other systems negative  [ ] Unable to assess ROS because ________    OBJECTIVE:  ICU Vital Signs Last 24 Hrs  T(C): 36.9 (24 Jan 2025 12:00), Max: 36.9 (23 Jan 2025 20:00)  T(F): 98.5 (24 Jan 2025 12:00), Max: 98.5 (23 Jan 2025 20:00)  HR: 61 (24 Jan 2025 16:15) (57 - 91)  BP: 152/75 (24 Jan 2025 16:15) (102/57 - 162/79)  BP(mean): 110 (24 Jan 2025 15:00) (77 - 112)  ABP: --  ABP(mean): --  RR: 22 (24 Jan 2025 16:15) (15 - 45)  SpO2: 100% (24 Jan 2025 16:15) (95% - 100%)    O2 Parameters below as of 24 Jan 2025 16:15  Patient On (Oxygen Delivery Method): tracheostomy collar  O2 Flow (L/min): 40  O2 Concentration (%): 40      Mode: HFTC, FiO2: 40    01-23 @ 07:01 - 01-24 @ 07:00  --------------------------------------------------------  IN: 2230 mL / OUT: 700 mL / NET: 1530 mL    01-24 @ 07:01 - 01-24 @ 17:39  --------------------------------------------------------  IN: 450 mL / OUT: 2000 mL / NET: -1550 mL      CAPILLARY BLOOD GLUCOSE  98 (24 Jan 2025 05:00)      POCT Blood Glucose.: 89 mg/dL (24 Jan 2025 13:00)      PHYSICAL EXAM:  General: AAOx3  HEENT: EOMI, PERRL  Lymph Nodes: No LAD  Neck: Supple, (+) trach  Respiratory: decreased bs at the bases  Cardiovascular: RRR   Abdomen: soft, NT/ND  Extremities: no c/c/e    HOSPITAL MEDICATIONS:  MEDICATIONS  (STANDING):  acetaminophen   IVPB .. 1000 milliGRAM(s) IV Intermittent once  acetylcysteine 10%  Inhalation 4 milliLiter(s) Inhalation every 8 hours  albumin human 25% IVPB 100 milliLiter(s) IV Intermittent every 1 hour  albuterol    0.083% 2.5 milliGRAM(s) Nebulizer every 6 hours  aMIOdarone    Tablet   Oral   aMIOdarone    Tablet 200 milliGRAM(s) Oral daily  ascorbic acid 500 milliGRAM(s) Oral daily  aspirin  chewable 81 milliGRAM(s) Oral daily  atorvastatin 80 milliGRAM(s) Oral at bedtime  bisacodyl Suppository 10 milliGRAM(s) Rectal once  chlorhexidine 0.12% Liquid 15 milliLiter(s) Oral Mucosa every 12 hours  chlorhexidine 2% Cloths 1 Application(s) Topical daily  chlorhexidine 4% Liquid 1 Application(s) Topical <User Schedule>  dextrose 50% Injectable 50 milliLiter(s) IV Push every 15 minutes  epoetin ignacia (EPOGEN) Injectable 22740 Unit(s) IV Push <User Schedule>  gabapentin 100 milliGRAM(s) Oral every 12 hours  heparin   Injectable 5000 Unit(s) SubCutaneous every 8 hours  HYDROmorphone  Injectable 0.5 milliGRAM(s) IV Push once  insulin lispro (ADMELOG) corrective regimen sliding scale   SubCutaneous every 6 hours  linezolid  IVPB      linezolid  IVPB 600 milliGRAM(s) IV Intermittent every 12 hours  meropenem  IVPB 500 milliGRAM(s) IV Intermittent every 24 hours  meropenem  IVPB      methocarbamol 500 milliGRAM(s) Oral every 8 hours  mirtazapine 7.5 milliGRAM(s) Oral daily  Nephro-elicia 1 Tablet(s) Oral daily  pantoprazole  Injectable 40 milliGRAM(s) IV Push daily  polyethylene glycol 3350 17 Gram(s) Oral two times a day  sodium chloride 0.65% Nasal 1 Spray(s) Both Nostrils every 12 hours  sodium chloride 0.9%. 1000 milliLiter(s) (10 mL/Hr) IV Continuous <Continuous>  traZODone 50 milliGRAM(s) Oral at bedtime    MEDICATIONS  (PRN):  acetaminophen     Tablet .. 650 milliGRAM(s) Oral every 6 hours PRN Mild Pain (1 - 3)  benzocaine 20% Spray 1 Spray(s) Topical every 6 hours PRN throat pain  lidocaine/prilocaine Cream 1 Application(s) Topical daily PRN pre-HD  sodium chloride 0.9% lock flush 10 milliLiter(s) IV Push every 1 hour PRN Pre/post blood products, medications, blood draw, and to maintain line patency      LABS:                        9.4    10.07 )-----------( 317      ( 24 Jan 2025 00:26 )             29.6     01-24    134[L]  |  96  |  38[H]  ----------------------------<  88  3.9   |  23  |  5.64[H]    Ca    8.4      24 Jan 2025 00:26  Phos  3.5     01-24  Mg     2.2     01-24    TPro  5.6[L]  /  Alb  2.7[L]  /  TBili  0.5  /  DBili  x   /  AST  19  /  ALT  10  /  AlkPhos  120  01-24    PT/INR - ( 23 Jan 2025 00:33 )   PT: 15.8 sec;   INR: 1.39 ratio         PTT - ( 23 Jan 2025 00:33 )  PTT:33.5 sec  Urinalysis Basic - ( 24 Jan 2025 00:26 )    Color: x / Appearance: x / SG: x / pH: x  Gluc: 88 mg/dL / Ketone: x  / Bili: x / Urobili: x   Blood: x / Protein: x / Nitrite: x   Leuk Esterase: x / RBC: x / WBC x   Sq Epi: x / Non Sq Epi: x / Bacteria: x        Venous Blood Gas:  01-23 @ 00:10  7.58/29/40/27/76.1  VBG Lactate: 1.0      RADIOLOGY:  [x] Reviewed and interpreted by me

## 2025-01-24 NOTE — PROGRESS NOTE ADULT - SUBJECTIVE AND OBJECTIVE BOX
PATIENT SEEN AND EXAMINED BY JEAN PAUL GABRIEL M.D. ON :- 1/24/25  DATE OF SERVICE:  1/24/25           Interim events noted,Labs ,Radiological studies and Cardiology tests reviewed .    Patient is a 54y old  Male who presents with a chief complaint of MI (24 Jan 2025 10:30)      HPI:  54M with PMH of HTN, HLD, ESRD on HD MWF via LUE AVF, ascites (requiring repeat paracenteses q4-6wks), NICM with moderate LV dysfunction w/ EF 35-40%(2023) and CAD s/p atherectomy + SALLIE to D1(4/11/2024) presents to [facility] transferred from [facility]. Patient initially presented to [facility] ED with shortness of breath. Of note he was recently admitted from 09/27-12/02 for acute cholecystitis s/p cholecystectomy. In [facility] ED, pt was hypoxic to 86% on RA, trop 1.7K, EKG no new changes, CXR fluid overload. Admitted for AHRF 2/2 fluid overload. Pt received HD on 12/18, 12/19, 12/20 and 12/22. DAPT was resumed, TTE showed improvement in LVEF to 40-45%. Stress test was abnormal with 1mm inferior STD, reversible ischemia in the inferior wall and fixed defects in the lateral, anterior and apical walls with post stress EF of 24%. Pt was then transferred to [facility] for cardiac cath which showed pLAD 70% ISR, mLCx 70%, D1 severe dz. Patient now transferred to [facility] CTS Dr. Rivers for CABG eval. Patient denies any current SOB or chest pain on admission. Otherwise denies headaches, abdominal pain, urinary or bowel changes, fevers, chills, N/V/D, sick contacts.  (24 Dec 2024 05:07)      PAST MEDICAL & SURGICAL HISTORY:  Type 2 diabetes mellitus      Hypertension      End stage renal disease  started HD 2/2019 T, Th, Sat via right chest permacath      Bone spur  right shoulder- hx of - sx done      Anemia      Injury of right wrist  hx of at age 14      History of hyperkalemia  before HD- K-6.2 - to repeat in am of sx, pt had HD today      S/P arthroscopy of right shoulder  2010      History of vascular access device  right chest permacath - 2/2019      H/O right wrist surgery  tendons repair - at age 14      AV fistula      H/O ventral hernia repair      S/P cholecystectomy          PREVIOUS DIAGNOSTIC TESTING:      ECHO  FINDINGS:    STRESS  FINDINGS:    CATHETERIZATION  FINDINGS:    MEDICATIONS  (STANDING):  acetylcysteine 10%  Inhalation 4 milliLiter(s) Inhalation every 8 hours  albuterol    0.083% 2.5 milliGRAM(s) Nebulizer every 6 hours  aMIOdarone    Tablet   Oral   aMIOdarone    Tablet 200 milliGRAM(s) Oral daily  ascorbic acid 500 milliGRAM(s) Oral daily  aspirin  chewable 81 milliGRAM(s) Oral daily  atorvastatin 80 milliGRAM(s) Oral at bedtime  bisacodyl Suppository 10 milliGRAM(s) Rectal once  chlorhexidine 0.12% Liquid 15 milliLiter(s) Oral Mucosa every 12 hours  chlorhexidine 2% Cloths 1 Application(s) Topical daily  chlorhexidine 4% Liquid 1 Application(s) Topical <User Schedule>  dextrose 50% Injectable 50 milliLiter(s) IV Push every 15 minutes  epoetin ignacia (EPOGEN) Injectable 58871 Unit(s) IV Push <User Schedule>  gabapentin 100 milliGRAM(s) Oral every 12 hours  heparin   Injectable 5000 Unit(s) SubCutaneous every 8 hours  insulin lispro (ADMELOG) corrective regimen sliding scale   SubCutaneous every 6 hours  linezolid  IVPB      linezolid  IVPB 600 milliGRAM(s) IV Intermittent every 12 hours  melatonin 5 milliGRAM(s) Oral at bedtime  meropenem  IVPB 500 milliGRAM(s) IV Intermittent every 24 hours  meropenem  IVPB      methocarbamol 500 milliGRAM(s) Oral every 8 hours  mirtazapine 7.5 milliGRAM(s) Oral daily  Nephro-elicia 1 Tablet(s) Oral daily  pantoprazole  Injectable 40 milliGRAM(s) IV Push daily  polyethylene glycol 3350 17 Gram(s) Oral two times a day  sodium chloride 0.65% Nasal 1 Spray(s) Both Nostrils every 12 hours  sodium chloride 0.9%. 1000 milliLiter(s) (10 mL/Hr) IV Continuous <Continuous>  traZODone 50 milliGRAM(s) Oral at bedtime    MEDICATIONS  (PRN):  acetaminophen     Tablet .. 650 milliGRAM(s) Oral every 6 hours PRN Mild Pain (1 - 3)  benzocaine 20% Spray 1 Spray(s) Topical every 6 hours PRN throat pain  lidocaine/prilocaine Cream 1 Application(s) Topical daily PRN pre-HD  sodium chloride 0.9% lock flush 10 milliLiter(s) IV Push every 1 hour PRN Pre/post blood products, medications, blood draw, and to maintain line patency      FAMILY HISTORY:  FH: hypertension  mother, father- alive    FH: type 2 diabetes  mother, father- alive        SOCIAL HISTORY:    CIGARETTES:    ALCOHOL:    REVIEW OF SYSTEMS:  CONSTITUTIONAL: No fever, weight loss, or fatigue  EYES: No eye pain, visual disturbances, or discharge  ENMT:  No difficulty hearing, tinnitus, vertigo; No sinus or throat pain  NECK: No pain or stiffness  RESPIRATORY: No cough, wheezing, chills or hemoptysis; No shortness of breath  CARDIOVASCULAR: No chest pain, palpitations, dizziness, or leg swelling  GASTROINTESTINAL: No abdominal or epigastric pain. No nausea, vomiting, or hematemesis; No diarrhea or constipation. No melena or hematochezia.  GENITOURINARY: No dysuria, frequency, hematuria, or incontinence  NEUROLOGICAL: No headaches, memory loss, loss of strength, numbness, or tremors  SKIN: No itching, burning, rashes, or lesions   LYMPH NODES: No enlarged glands  ENDOCRINE: No heat or cold intolerance; No hair loss  MUSCULOSKELETAL: No joint pain or swelling; No muscle, back, or extremity pain  PSYCHIATRIC: No depression, anxiety, mood swings, or difficulty sleeping  HEME/LYMPH: No easy bruising, or bleeding gums  ALLERY AND IMMUNOLOGIC: No hives or eczema    Vital Signs Last 24 Hrs  T(C): 36.4 (24 Jan 2025 20:00), Max: 36.9 (24 Jan 2025 12:00)  T(F): 97.5 (24 Jan 2025 20:00), Max: 98.5 (24 Jan 2025 12:00)  HR: 74 (24 Jan 2025 22:00) (57 - 91)  BP: 159/76 (24 Jan 2025 22:00) (109/61 - 169/82)  BP(mean): 109 (24 Jan 2025 22:00) (80 - 118)  RR: 17 (24 Jan 2025 22:00) (8 - 45)  SpO2: 99% (24 Jan 2025 22:00) (95% - 100%)    Parameters below as of 24 Jan 2025 20:00  Patient On (Oxygen Delivery Method): ventilator, trach hi malik   O2 Flow (L/min): 40  O2 Concentration (%): 40      PHYSICAL EXAM:  GENERAL: NAD, well-groomed, well-developed  HEAD:  Atraumatic, Normocephalic  EYES: EOMI, PERRLA, conjunctiva and sclera clear  ENMT: No tonsillar erythema, exudates, or enlargement; Moist mucous membranes, Good dentition, No lesions  NECK: Supple, No JVD, Normal thyroid  NERVOUS SYSTEM:  Alert & Oriented X3, Good concentration; Motor Strength 5/5 B/L upper and lower extremities; DTRs 2+ intact and symmetric  CHEST/LUNG: Clear to percussion bilaterally; No rales, rhonchi, wheezing, or rubs  HEART: Regular rate and rhythm; No murmurs, rubs, or gallops  ABDOMEN: Soft, Nontender, Nondistended; Bowel sounds present  EXTREMITIES:  2+ Peripheral Pulses, No clubbing, cyanosis, or edema  LYMPH: No lymphadenopathy noted  SKIN: No rashes or lesions      INTERPRETATION OF TELEMETRY:    ECG:    CHADSVASC:     LABS:                        9.4    10.07 )-----------( 317      ( 24 Jan 2025 00:26 )             29.6     01-24    134[L]  |  96  |  38[H]  ----------------------------<  88  3.9   |  23  |  5.64[H]    Ca    8.4      24 Jan 2025 00:26  Phos  3.5     01-24  Mg     2.2     01-24    TPro  5.6[L]  /  Alb  2.7[L]  /  TBili  0.5  /  DBili  x   /  AST  19  /  ALT  10  /  AlkPhos  120  01-24        PT/INR - ( 23 Jan 2025 00:33 )   PT: 15.8 sec;   INR: 1.39 ratio         PTT - ( 23 Jan 2025 00:33 )  PTT:33.5 sec  Urinalysis Basic - ( 24 Jan 2025 00:26 )    Color: x / Appearance: x / SG: x / pH: x  Gluc: 88 mg/dL / Ketone: x  / Bili: x / Urobili: x   Blood: x / Protein: x / Nitrite: x   Leuk Esterase: x / RBC: x / WBC x   Sq Epi: x / Non Sq Epi: x / Bacteria: x      Lipid Panel:   I&O's Summary    23 Jan 2025 07:01  -  24 Jan 2025 07:00  --------------------------------------------------------  IN: 2230 mL / OUT: 700 mL / NET: 1530 mL    24 Jan 2025 07:01  -  24 Jan 2025 22:44  --------------------------------------------------------  IN: 695 mL / OUT: 2000 mL / NET: -1305 mL        RADIOLOGY & ADDITIONAL STUDIES:    < from: TTE W or WO Ultrasound Enhancing Agent (01.19.25 @ 09:06) >    TRANSTHORACIC ECHOCARDIOGRAM REPORT  ________________________________________________________________________________                                      _______       Pt. Name:       ALAINA CANO Study Date:    1/19/2025  MRN:            [id number]       YOB: 1970  Accession #:    [id number]        Age:           54 years  Account#:       [id number]     Gender:        M  Heart Rate:                      Height:        71.00 in (180.34 cm)  Rhythm:Weight:        187.00 lb (84.82 kg)  Blood Pressure: 120/50 mmHg      BSA/BMI:       2.05 m² / 26.08 kg/m²  ________________________________________________________________________________________  Referring Physician:    [id number] Karan Rivers  Interpreting Physician: Bozena Suh MD  Primary Sonographer:    Layla MCLEAN    CPT:               ECHO TTE W/O CON F/U LTD - 93847.m  Indication(s):     Malignant pericardial effusion in diseases classified                     elsewhere - I31.31  Procedure:         Limited transthoracic echocardiogram.  Ordering Location: [facility]  Admission Status:  Inpatient  Study Information: Image quality for this study is technically difficult.    _______________________________________________________________________________________     CONCLUSIONS:      1. Technically difficult image quality.   2. No pericardial effusion seen.    ________________________________________________________________________________________  FINDINGS:     Pericardium:  No pericardial effusion seen.  ________________________________________________________________________________________  Electronically signed on 1/19/2025 at 4:42:24 PM by Bozena Suh MD      *** Final ***    < end of copied text >

## 2025-01-24 NOTE — PROGRESS NOTE ADULT - ASSESSMENT
54M with HTN, HLD, ESRD on HD MWF via LUE AVF, ascites (requiring repeat paracenteses q4-6wks), NICM with moderate LV dysfunction w/ EF 35-40%() and CAD s/p atherectomy + SALLIE to D1 (2024) presents to [facility] 2024 as transfer from [facility] (via [facility]) where he presented  with SOB.   In [facility] ED, pt was hypoxic to 86% on RA, trop 1.7K, EKG no new changes, CXR fluid overload. Admitted for AHRF 2/2 fluid overload. Pt received HD on , ,  and . DAPT was resumed, TTE showed improvement in LVEF to 40-45%. Stress test was abnormal with 1mm inferior STD, reversible ischemia in the inferior wall and fixed defects in the lateral, anterior and apical walls with post stress EF of 24%. Pt was then transferred to [facility] for cardiac cath which showed pLAD 70% ISR, mLCx 70%, D1 severe dz. Patient now transferred to [facility] 2024 for CABG.       - underwent  C3L free LIMA-LAD; SVG-Diag; SVG-leftPL   - extubated   - hypotension and ECHO showing Systolic HF/depressed BIV Function, currently supported with /pressors.  Labs this evening showing transaminitis   - hypotensive, febrile and reintubated  1/3 - cultures (+) E coli +ESBL bacteremia   - underwent tracheostomy   - left lung collapse s/p bronchoscopy   - seen by plastic surgery / colorectal for sacral wound, no surgical intervention, no evidence of fistula, BC sent    Recommendations     54M with HTN, HLD, ESRD on HD MWF via LUE AVF, ascites (requiring repeat paracenteses q4-6wks), NICM with moderate LV dysfunction w/ EF 35-40%() and CAD s/p atherectomy + SALLIE to D1 (2024) presents to [facility] 2024 as transfer from [facility] (via [facility]) where he presented  with SOB.   In [facility] ED, pt was hypoxic to 86% on RA, trop 1.7K, EKG no new changes, CXR fluid overload. Admitted for AHRF 2/2 fluid overload. Pt received HD on , ,  and . DAPT was resumed, TTE showed improvement in LVEF to 40-45%. Stress test was abnormal with 1mm inferior STD, reversible ischemia in the inferior wall and fixed defects in the lateral, anterior and apical walls with post stress EF of 24%. Pt was then transferred to [facility] for cardiac cath which showed pLAD 70% ISR, mLCx 70%, D1 severe dz. Patient now transferred to [facility] 2024 for CABG.       - underwent  C3L free LIMA-LAD; SVG-Diag; SVG-leftPL   - extubated   - hypotension and ECHO showing Systolic HF/depressed BIV Function, currently supported with /pressors.  Labs this evening showing transaminitis   - hypotensive, febrile and reintubated  1/3 - cultures (+) E coli +ESBL bacteremia   - underwent tracheostomy   - left lung collapse s/p bronchoscopy   - seen by plastic surgery / colorectal for sacral wound, no surgical intervention, no evidence of fistula, BC sent    Recommendations  - continue linezolid  - f/u repeat BC, sent   - continue meropenem  - consider sono Right forearm    Please call the ID service [phone] with questions or concerns over the weekend

## 2025-01-24 NOTE — PROGRESS NOTE ADULT - ASSESSMENT
54M with PMH of HTN, HLD, ESRD on HD MWF via LUE AVF, ascites (requiring repeat paracenteses q4-6wks), NICM with moderate LV dysfunction w/ EF 35-40%() and CAD s/p atherectomy + SALLIE to D1(2024) presents to [facility] transferred from [facility]. Patient initially presented to [facility] ED with shortness of breath. Of note he was recently admitted from - for acute cholecystitis s/p cholecystectomy. In [facility] ED, pt was hypoxic to 86% on RA, trop 1.7K, EKG no new changes, CXR fluid overload. Admitted for AHRF 2/2 fluid overload. Pt received HD on , ,  and . DAPT was resumed, TTE showed improvement in LVEF to 40-45%. Stress test was abnormal with 1mm inferior STD, reversible ischemia in the inferior wall and fixed defects in the lateral, anterior and apical walls with post stress EF of 24%.     Pt was then transferred to [facility] for cardiac cath which showed pLAD 70% ISR, mLCx 70%, D1 severe dz.     Patient now transferred to [facility] CTS Dr. Rivers for CABG eval.# CAD s/p NSTEMI  Hx CAD s/p PCI LAD   Presented with ADHF elevated troponins  Positive NST1-Cath- pLAD 70% ISR, mLCx 70%, D1 severe dz.   TTE EF 35% Severe TRWas on Plavix-p2y12- 321 been off Plavix since -POD#1-C3L free LIMA-LAD; SVG-Diag; PSG-vwrdYF-Tycmgbdts on  Sinus rhythm,Amio for AF prophylaxis  -OOB off  Sinus rhythm   -POD#3-On /pressors-EF 25-30% RV failure with transaminitis-Sinus Glqsiv73/03-POD#4-Was intubated for hypoxia-gradual hypotension on /pressors had IABP inserted this morning  -POD#5-s/p IABP insertion, sepsis/septic shock due to GNR/ECOLI HD cath placed   Bronched yesterday 1/3 - minimal secretions with rust-tinged sputum   ESBL-E Coli bacteremia on meropenam    - POD#7 Atrial Fib ,remains intubated weaning IABP 1:3,off pressors on CRRT  -IABP removed.Remains on vent-Alex 10ppm,off Levo- CVP 10 / MAP 71 / Hct 25.6% / Lactate 1.7-Atrial Fibrillation  -Intubated on Alex 10ppm, gtt, BP better-AF~~90's on Amio-had bronch still febrile Tmax 69654/09-Extubated awake alert on HFNC AF,on Dobutrex-CRRT,Cultures no growth    01/10-OOB on BiPAP Sinus Rhythm on -SR/ MAP 57/ CVP 10/  Hct 26.7 / Lact 1.4  -s/p EDU/DCCV-Sinus rhythm on -SR / MAP 52 / CVP 12/ Hct 25.8 / Lact 0.7-had bronch with BAL Left lung  -Sinus rhythm on -for repeat Bronch-SR / MAP 67 / CVP 11 / Hct 27.4% / Lact 0.8  -s/p Trach on  TTE EF 55%-SR / CVP 2 / MAP 63 / Hct 25.9% / Lactate 0.9  -Awake on Trach collar off  c/o buttock pain had bronch yesterday-BAL-Sinus rhythm  -Sinus rhythm on vent at night-BP stable may need to increase hydrallazine 25 mg q8      # Acute on Chronic Systolic Heart Failure now improved  EF-35% ,severe TR  Had HD post op  GDMT post op  LVEF improved post bypass but now at 23-30%-BiV dysfunction on  now off pressors  -TTE EF 40%,trace effusion  ECG c/w pericarditis-was on colchicine   01/15-TTE EF 55%    Vascular evaluated  AVGraft /?steal causing RV failure-'' Very low suspicion of steal Sd and AVF causing current clinical state. ''   VA Duplex Hemodialysis Access, Left (25 @ 13:35) >   Arterial flow volume of 1301 mL/m, and the venous flow volume of  1492 mL/m are consistent with a normally functioning dialysis access  fistula.      # Ascites  Hx chronic ascites  Has drainage q4-6 weeks  Last drained -4600mL  ? ascites related to severe TR  Had wilfgihbyrdw-Vwevhkr-os growth   CT Abdomen 01/10-Large volume ascites-consider paracentesis  Monitor      # ESRD  Now off CRRT transition to HD     # Sacral Decubitus  Seen by Plastics and Gen Surgery

## 2025-01-24 NOTE — CONSULT NOTE ADULT - ASSESSMENT
54M with PMH of HTN, HLD, ESRD on HD MWF via LUE AVF, ascites (requiring repeat paracenteses q4-6wks, f/u Dr. Li at [facility]), CHF w/ EF 35-40%() and CAD s/p atherectomy + SALLIE to D1(2024) initially presented for SOB at OSH with elevated cardiac enzyme, transferred initially to [facility] for cath, then [facility], now s/p CABG x 3 on 24 w/ post op course complicated by reintubation for hypoxia found to have ESBL pneumonia, collapsed left lung, s/p IABP insertion for septic shock due to E coli, s/p multiple bronch and tracheostomy tube placement. c/b a sacral pressure wound. Hepatology was consulted for ascites c/f cirrhosis     #Recurrent Ascites  -likely non cirrhotic portal hypertension 2/2 MI causing CHF s/p cath and CABG, need to r/o cirrhosis  -25, peritoneal fluid SAAG ~1.1, protein >2.5   - at OSH, Echo showed newly depressed EF severe TR EF 35%  - at [facility], LHC showed pLAD 70% ISR, mLCx 70%, D1 severe dz.  - CABG X 3  free LIMA-LAD; SVG-Diag; SVG-left PL, post ECHO EF 45 % with  gtt    -optimize volume status (improving based on imaging) by optimizing CHF medication regime and addressing valvular disease ie TR.   -consider diagnostic para with cytology w/u given CT findings of Ill-defined infiltration within the right upper quadrant mesentery and within the omentum (3:165 and 3:175, respectively) and a questionable subcentimeter peritoneal nodule.  -for further workup of possible cirrhosis, please check PETH level [and preferably ethyl alcohol quantitative urine testing given faster turn around time] and serum/urine drug screening;   -check HAV IgM, HBsAg, HBsAb, HBcAb IgM, HCV Ab [with reflex HCV PCR if positive], HBV PCR, HEV IgM/IgG  -Consider Extended W/u with: HSV 1/2 PCR, VSV PCR   -assess for autoimmune etiology with JONAS [antinuclear antibody], antimitochondrial antibody [AMA], ASMA [anti smooth muscle antibody], anti-LKM [liver kidney microsomal antibody], anti-sLA [soluble liver antigen antibody], and quantitative immunoglobulins [IgG, IgA, IgM].    -check ceruloplasmin  -check alpha-1 antitrypsin AAT    Note is not final until attested 54M with PMH of HTN, HLD, ESRD on HD MWF via LUE AVF, ascites (requiring repeat paracenteses q4-6wks, f/u Dr. Li at [facility]), CHF w/ EF 35-40%() and CAD s/p atherectomy + SALLIE to D1(2024) initially presented for SOB at OSH with elevated cardiac enzyme, transferred initially to [facility] for cath, then [facility], now s/p CABG x 3 on 24 w/ post op course complicated by reintubation for hypoxia found to have ESBL pneumonia, collapsed left lung, s/p IABP insertion for septic shock due to E coli, s/p multiple bronch and tracheostomy tube placement. c/b a sacral pressure wound. Hepatology was consulted for ascites c/f cirrhosis     #Recurrent Ascites  -likely non cirrhotic portal hypertension 2/2 CHF s/p cath and CABG, need to r/o cirrhosis  -25, peritoneal fluid SAAG ~1.1, protein >2.5   - at OSH, Echo showed newly depressed EF severe TR EF 35%  - at [facility], LHC showed pLAD 70% ISR, mLCx 70%, D1 severe dz.  - CABG X 3  free LIMA-LAD; SVG-Diag; SVG-left PL, post ECHO EF 45 % with  gtt    -optimize volume status (improving based on imaging) by optimizing CHF medication regime and addressing valvular disease ie TR.   -consider measuring hepatic venous pressure gradient to further delineate etiologies of portal hypertension  -consider diagnostic para with cytology w/u given CT findings of Ill-defined infiltration within the right upper quadrant mesentery and within the omentum (3:165 and 3:175, respectively) and a questionable subcentimeter peritoneal nodule.  -for further workup of possible cirrhosis, please check PETH level [and preferably ethyl alcohol quantitative urine testing given faster turn around time] and serum/urine drug screening;   -check HAV IgM, HBsAg, HBsAb, HBcAb IgM, HCV Ab [with reflex HCV PCR if positive], HBV PCR, HEV IgM/IgG  -Consider Extended W/u with: HSV 1/2 PCR, VSV PCR   -assess for autoimmune etiology with JONAS [antinuclear antibody], antimitochondrial antibody [AMA], ASMA [anti smooth muscle antibody], anti-LKM [liver kidney microsomal antibody], anti-sLA [soluble liver antigen antibody], and quantitative immunoglobulins [IgG, IgA, IgM].    -check ceruloplasmin  -check alpha-1 antitrypsin AAT    Note is not final until attested

## 2025-01-24 NOTE — CHART NOTE - NSCHARTNOTEFT_GEN_A_CORE
54M s/p CABG x 3 on 12/30/24, postop course c/b acute respiratory failure 2/2 E coli bacteremia 2/2 pneumonia. ACS re-consulted for sacral decubitus ulcer.      Vital Signs Last 24 Hrs  T(C): 36.9 (24 Jan 2025 12:00), Max: 36.9 (23 Jan 2025 20:00)  T(F): 98.5 (24 Jan 2025 12:00), Max: 98.5 (23 Jan 2025 20:00)  HR: 72 (24 Jan 2025 17:42) (57 - 91)  BP: 152/75 (24 Jan 2025 16:15) (102/57 - 162/79)  BP(mean): 110 (24 Jan 2025 15:00) (77 - 112)  RR: 22 (24 Jan 2025 16:15) (15 - 45)  SpO2: 100% (24 Jan 2025 17:42) (95% - 100%)    Parameters below as of 24 Jan 2025 17:42  Patient On (Oxygen Delivery Method): tracheostomy collar      Labs:                        9.4    10.07 )-----------( 317      ( 24 Jan 2025 00:26 )             29.6       01-24    134[L]  |  96  |  38[H]  ----------------------------<  88  3.9   |  23  |  5.64[H]    Ca    8.4      24 Jan 2025 00:26  Phos  3.5     01-24  Mg     2.2     01-24    TPro  5.6[L]  /  Alb  2.7[L]  /  TBili  0.5  /  DBili  x   /  AST  19  /  ALT  10  /  AlkPhos  120  01-24        Physical Exam:  General: AAOx3, uncomfortable appearing  Back: ~10 cm x 5 cm superficial sacral wound, wound bed pink and dry w/o discharge, central fibrinous sloughing. No purulent output        Plan:  - No surgical intervention at this time  - C/w wound care recommendations-- contacted wound care team for continued management of sacral ulcer  - Please re-consult for change in clinical condition  - appreciate continued care per CTICU     ACS/Trauma Surgery  [phone].

## 2025-01-24 NOTE — PROCEDURE NOTE - NSBRONCHPROCDETAILS_GEN_A_CORE_FT
Bronchoscope was inserted through the tracheostomy tube. The tracheostomy tube was inspected and found to be in good position. The entire airway was inspected to the subsegmental level. Therapeutic lavage and aspiration of secretions in the left lower lobe, left mainstem bronchus, and right mainstem bronchus. Bronchoalveolar lavage conducted in the left lower lobe. Bronchoscope withdrawn from the ET tube.

## 2025-01-24 NOTE — CHART NOTE - NSCHARTNOTEFT_GEN_A_CORE
NUTRITION FOLLOW UP NOTE    PATIENT SEEN FOR: consult from PA for Vital tube feed recommendations    SOURCE: [] Patient  [x] Current Medical Record  [] RN  [] Family/support person at bedside  [] Patient unavailable/inappropriate  [x] Other: MYRA Varghese    CHART REVIEWED/EVENTS NOTED.  [] No changes to nutrition care plan to note  [x] Nutrition Status:  -s/p CABG 24  -septic shock  -Hx ESRD on HD, receiving HD sessions in-house  -emergently reintubated 1/15. s/p tracheostomy   -Seen by Hepatology today for recurrent ascites  -Medical team requesting to switch tube feed formula to Vital    DIET ORDER:   Diet, NPO with Tube Feed:   Tube Feeding Modality: Nasogastric  Vital AF (VITALAFRTH)  Total Volume for 24 Hours (mL): 1200  Continuous  Starting Tube Feed Rate {mL per Hour}: 50  Until Goal Tube Feed Rate (mL per Hour): 50  Tube Feed Duration (in Hours): 24  Tube Feed Start Time: 13:00 (25)      CURRENT DIET ORDER IS:  [] Appropriate:  [x] Inadequate: See recommendation below   [] Other:    NUTRITION INTAKE/PROVISION:  [] PO:  [x] Enteral Nutrition: on Nepro until today  EN Order Provides:  1200 ml formula, 1440 kcal, 90 g protein, 973 ml free water; meets 18.5 kcal/kg and 1.15 g protein/kg, based on wt: IBW 172lb/78kg.    Current Pump Rate: off   5-Day Average Enteral Provision per RN flowsheet: 1080 mL, 1944 kcals, 87 g protein (based on Nepro)  [] Parenteral Nutrition:    ANTHROPOMETRICS:  Drug Dosing Weight  Height (cm): 180.3 (30 Dec 2024 12:01)  Weight (kg): 85.1 (30 Dec 2024 12:01)  BMI (kg/m2): 26.2 (30 Dec 2024 12:01)  Weights:   Daily Weight in k.1 (), 85.6 (), 81.6 (), Weight in k.7 (), 80 (), 88 (), 83.4 (), 77.7 (), 82 (01-15), 81 (), 81 (), 88.7 (), 83.3 (), 97.6 (01-10), 79.6 (), 93.4 (), 87.9 (), 85 (), 86.2 (), 90 (), 84 (), 77.5 (), 87.8 (), 88.3 (, standing), 85.3 (, standing), 85 (, standing), 79.7 (), 80.9 (, standing), 81.3 (, standing), 82.6 ().  Weight fluctuations likely in setting of fluid shifts, scale inaccuracies, and/or prior Inadequate energy intake.    NUTRITIONALLY PERTINENT MEDICATIONS:  MEDICATIONS  (STANDING):  albumin human 25% IVPB  aMIOdarone    Tablet  aMIOdarone    Tablet  ascorbic acid  atorvastatin  bisacodyl Suppository  dextrose 50% Injectable  insulin lispro (ADMELOG) corrective regimen sliding scale  linezolid  IVPB  linezolid  IVPB  meropenem  IVPB  meropenem  IVPB  Nephro-elicia  pantoprazole  Injectable  polyethylene glycol 3350  sodium chloride 0.9%.       NUTRITIONALLY PERTINENT LABS:   Na134 mmol/L[L] Glu 88 mg/dL K+ 3.9 mmol/L Cr  5.64 mg/dL[H] BUN 38 mg/dL[H]  Phos 3.5 mg/dL  Alb 2.7 g/dL[L] ALT 10 U/L AST 19 U/L Alkaline Phosphatase 120 U/L    A1C with Estimated Average Glucose Result: 5.6 % (24 @ 06:50)          Finger Sticks:  POCT Blood Glucose.: 89 mg/dL ( @ 13:00)  POCT Blood Glucose.: 98 mg/dL ( @ 05:33)  POCT Blood Glucose.: 126 mg/dL ( @ 18:01)      NUTRITIONALLY PERTINENT MEDICATIONS/LABS:  [x] Reviewed  [x] Relevant notes on medications/labs:  -sliding scale insulin for glycemic control  -hyponatremia  -K and phosphorus WNL    EDEMA:  [x] Reviewed  [x] Relevant notes: 1+ generalized edema per flowsheets     GI/ I&O:  [x] Reviewed  [x] Relevant notes: noted with liquid brown stool overnight per flowsheets; no GI signs/symptoms per most recent RN flowsheet  [] Other:    SKIN:   [] No pressure injuries documented, per nursing flowsheet  [x] Pressure injury previously noted  -Per Wound Care :  "Sacral/buttocks wound 2/2 skin failure, Rt upper back injury now resolved"  (previously noted as "Sacral region/Bilateral buttocks deep tissue injury, Right upper back deep tissue injury" per Wound Care )  [] Change in pressure injury documentation:  [x] Other: midsternal surgical incision from CABG     ESTIMATED NEEDS:  [x] No change:  [] Updated:  Energy:  2612-8691 kcal/day (30-35 kcal/kg)  Protein:  109-140 g/day (1.4-1.8 g/kg)  Fluid:   ml/day or [x] defer to team  Based on: IBW 172lb/78kg with consideration for HD, skin integrity     NUTRITION DIAGNOSIS:  [x] Prior Dx: Inadequate energy intake, Increased Nutrient Needs (protein-energy, micronutrients)  [] New Dx:    EDUCATION:  [] Yes:  [x] Not appropriate/warranted    NUTRITION CARE PLAN:  1. Diet: Change formula to Vital 1.5 (more calorie dense than Vital AF). Recommend goal rate 65ml/hr x 24hr and add Prosource TF Free 1x/day. At goal provides 1560ml, 2430kcal, 120g protein, 1192ml free water; meets 31.2kcal/kg and 1.54g/kg protein based on IBW 172lb/78kg. Defer free water flushes to medical team.   2. Multivitamin/mineral supplementation: Continue nephro-elicia for wound healing pending no medical contraindications. Defer Vitamin C to medical team as patient is receiving HD.    [] Achieved - Continue current nutrition intervention(s)  [] Current medical condition precludes nutrition intervention at this time.    MONITORING AND EVALUATION:   RD remains available upon request and will follow up per protocol.    Corinne Lima RDN, CDN - available via [place] TEAMS

## 2025-01-24 NOTE — PROGRESS NOTE ADULT - ASSESSMENT
54 year old male with HTN, HLD, ESRD on HD, ascites, CHF, and CAD s/p PCI (4/2024) transferred to [facility] from [facility] for cardiac catheterization due to abnormal stress test. Cath showed multivessel disease now s/p CABG 12/30/24. Course c/b acute on chronic hypoxemic respiratory failure in setting of recurrent mucus plugging on left s/p multiple bronchoscopies.     Patient seen and examined at the bedside.  Bronchoscopy performed which showed significant improvement in secretion burden compared to last week.  No evidence of dynamic airway collapse or TBM noted on exam; however, the patient was sedated and paralyzed for the bronch and not breathing spontaneously.  Therefore, it is difficult to assess for TBM at this time.  However, would not pursue stenting at this time as it can cause issues with secretion management as silicone stents have been known to cause increase in secretions.  Would continue to optimize with airway clearance ATC.  Cont antibiotics and follow up BAL results. Plan to repeat dynamic bronchoscopy on Monday to prove absence or presence of TBM.  Can also consider a dynamic CT of the chest in the future as well.  If secretion issues persists can re-evaluate the possibility of a Y-stent but not until the above is performed. Appreciate the consultation and will continue to follow.

## 2025-01-24 NOTE — PROCEDURE NOTE - NSBRONCHCOMMENTS_GEN_A_CORE_FT
Bronchoscopy reveals improving sputum burden as compared to bronchoscopy from last week. There is no indication for airway stenting at this time and all airways are patent. Bronchoscopy reveals improving sputum burden as compared to bronchoscopy from last week.

## 2025-01-24 NOTE — CONSULT NOTE ADULT - SUBJECTIVE AND OBJECTIVE BOX
Interventional Radiology    Evaluate for Procedure: left thoracentesis    HPI: 54M with HTN, HLD, ESRD on HD MWF via LUE AVF, ascites (requiring repeat paracenteses q4-6wks), NICM with moderate LV dysfunction w/ EF 35-40%() and CAD s/p atherectomy + SALLIE to D1 (2024) presents to [facility] 2024 as transfer from [facility] (via [facility]) where he presented  with SOB.   In [facility] ED, pt was hypoxic to 86% on RA, trop 1.7K, EKG no new changes, CXR fluid overload. Admitted for AHRF 2/2 fluid overload. Pt received HD on , ,  and . DAPT was resumed, TTE showed improvement in LVEF to 40-45%. Stress test was abnormal with 1mm inferior STD, reversible ischemia in the inferior wall and fixed defects in the lateral, anterior and apical walls with post stress EF of 24%. Pt was then transferred to [facility] for cardiac cath which showed pLAD 70% ISR, mLCx 70%, D1 severe dz. Patient now transferred to [facility] 2024 for CABG.    Pt now with persistent left small pleural effusion- IR consulted for drainage    Allergies: No Known Allergies    Medications (Abx/Cardiac/Anticoagulation/Blood Products)  amikacin  IVPB: 101.72 mL/Hr IV Intermittent ( @ 09:38)  aMIOdarone    Tablet: 200 milliGRAM(s) Oral ( @ 05:34)  aspirin  chewable: 81 milliGRAM(s) Oral ( @ 12:53)  heparin   Injectable: 5000 Unit(s) SubCutaneous ( @ 13:52)  hydrALAZINE: 10 milliGRAM(s) Oral ( @ 05:11)  hydrALAZINE Injectable: 10 milliGRAM(s) IV Push ( @ 02:34)  linezolid  IVPB: 300 mL/Hr IV Intermittent ( @ 14:34)  linezolid  IVPB: 300 mL/Hr IV Intermittent ( @ 14:31)  meropenem  IVPB: 100 mL/Hr IV Intermittent ( @ 13:52)    Data:  T(C): 36.9  HR: 64  BP: 162/79  RR: 22  SpO2: 100%    -WBC 10.07 / HgB 9.4 / Hct 29.6 / Plt 317  -Na 134 / Cl 96 / BUN 38 / Glucose 88  -K 3.9 / CO2 23 / Cr 5.64  -ALT 10 / Alk Phos 120 / T.Bili 0.5  -INR 1.39 / PTT 33.5    Radiology: CT reviewed    Assessment/Plan: 54M with HTN, HLD, ESRD on HD MWF via LUE AVF, ascites (requiring repeat paracenteses q4-6wks), NICM with moderate LV dysfunction w/ EF 35-40%() and CAD s/p atherectomy + SALLIE to D1 (2024) presents to [facility] 2024 as transfer from [facility] (via [facility]) where he presented  with SOB.   In [facility] ED, pt was hypoxic to 86% on RA, trop 1.7K, EKG no new changes, CXR fluid overload. Admitted for AHRF 2/2 fluid overload. Pt received HD on , ,  and . DAPT was resumed, TTE showed improvement in LVEF to 40-45%. Stress test was abnormal with 1mm inferior STD, reversible ischemia in the inferior wall and fixed defects in the lateral, anterior and apical walls with post stress EF of 24%. Pt was then transferred to [facility] for cardiac cath which showed pLAD 70% ISR, mLCx 70%, D1 severe dz. Patient now transferred to [facility] 2024 for CABG.    Pt now with persistent left small pleural effusion- IR consulted for drainage    - case and imaging reviewed with IR attending Dr. Lewis  - effusion very small with no safe percutaneous window for aspiration or drainage  - recommend medical management  - d/w primary team    Any questions or concerns regarding above please reach out to IR:   -Available on micro[employer] teams  -During working hours (7a-5p): call -[phone]  -Emergent issues after 5pm: page: [phone]  -Non-emergent consults: Please place a [place] order "IR Consult" with an appropriate callback number  -Scheduling questions: [phone]  -Clinic/Outpatient [phone]      Sury Montelongo, [facility]  Available on teams

## 2025-01-25 LAB
-  AMPICILLIN: SIGNIFICANT CHANGE UP
-  DAPTOMYCIN: SIGNIFICANT CHANGE UP
-  GENTAMICIN SYNERGY: SIGNIFICANT CHANGE UP
-  LINEZOLID: SIGNIFICANT CHANGE UP
-  STREPTOMYCIN SYNERGY: SIGNIFICANT CHANGE UP
-  VANCOMYCIN: SIGNIFICANT CHANGE UP
ALBUMIN SERPL ELPH-MCNC: 3 G/DL — LOW (ref 3.3–5)
ALT FLD-CCNC: 15 U/L — SIGNIFICANT CHANGE UP (ref 10–45)
ANION GAP SERPL CALC-SCNC: 13 MMOL/L — SIGNIFICANT CHANGE UP (ref 5–17)
AST SERPL-CCNC: 64 U/L — HIGH (ref 10–40)
BASOPHILS # BLD AUTO: 0.06 K/UL — SIGNIFICANT CHANGE UP (ref 0–0.2)
BASOPHILS NFR BLD AUTO: 0.6 % — SIGNIFICANT CHANGE UP (ref 0–2)
BILIRUB SERPL-MCNC: 0.6 MG/DL — SIGNIFICANT CHANGE UP (ref 0.2–1.2)
BUN SERPL-MCNC: 25 MG/DL — HIGH (ref 7–23)
CALCIUM SERPL-MCNC: 8.6 MG/DL — SIGNIFICANT CHANGE UP (ref 8.4–10.5)
CHLORIDE SERPL-SCNC: 95 MMOL/L — LOW (ref 96–108)
CO2 SERPL-SCNC: 25 MMOL/L — SIGNIFICANT CHANGE UP (ref 22–31)
CREAT SERPL-MCNC: 4.32 MG/DL — HIGH (ref 0.5–1.3)
CULTURE RESULTS: ABNORMAL
EGFR: 15 ML/MIN/1.73M2 — LOW
EGFR: 15 ML/MIN/1.73M2 — LOW
EOSINOPHIL # BLD AUTO: 0.84 K/UL — HIGH (ref 0–0.5)
EOSINOPHIL NFR BLD AUTO: 8.4 % — HIGH (ref 0–6)
GLUCOSE SERPL-MCNC: 92 MG/DL — SIGNIFICANT CHANGE UP (ref 70–99)
HCT VFR BLD CALC: 31.3 % — LOW (ref 39–50)
HGB BLD-MCNC: 9.9 G/DL — LOW (ref 13–17)
IMM GRANULOCYTES NFR BLD AUTO: 0.7 % — SIGNIFICANT CHANGE UP (ref 0–0.9)
LYMPHOCYTES # BLD AUTO: 0.53 K/UL — LOW (ref 1–3.3)
MAGNESIUM SERPL-MCNC: 2.2 MG/DL — SIGNIFICANT CHANGE UP (ref 1.6–2.6)
MCHC RBC-ENTMCNC: 29.4 PG — SIGNIFICANT CHANGE UP (ref 27–34)
MCHC RBC-ENTMCNC: 31.6 G/DL — LOW (ref 32–36)
MCV RBC AUTO: 92.9 FL — SIGNIFICANT CHANGE UP (ref 80–100)
METHOD TYPE: SIGNIFICANT CHANGE UP
MONOCYTES # BLD AUTO: 0.74 K/UL — SIGNIFICANT CHANGE UP (ref 0–0.9)
MONOCYTES NFR BLD AUTO: 7.4 % — SIGNIFICANT CHANGE UP (ref 2–14)
NEUTROPHILS # BLD AUTO: 7.73 K/UL — HIGH (ref 1.8–7.4)
NEUTROPHILS NFR BLD AUTO: 77.6 % — HIGH (ref 43–77)
NRBC # BLD: 0 /100 WBCS — SIGNIFICANT CHANGE UP (ref 0–0)
NRBC BLD-RTO: 0 /100 WBCS — SIGNIFICANT CHANGE UP (ref 0–0)
ORGANISM # SPEC MICROSCOPIC CNT: ABNORMAL
ORGANISM # SPEC MICROSCOPIC CNT: ABNORMAL
PHOSPHATE SERPL-MCNC: 3.7 MG/DL — SIGNIFICANT CHANGE UP (ref 2.5–4.5)
PLATELET # BLD AUTO: 406 K/UL — HIGH (ref 150–400)
POTASSIUM SERPL-MCNC: 5 MMOL/L — SIGNIFICANT CHANGE UP (ref 3.5–5.3)
POTASSIUM SERPL-SCNC: 5 MMOL/L — SIGNIFICANT CHANGE UP (ref 3.5–5.3)
PROT SERPL-MCNC: 6.3 G/DL — SIGNIFICANT CHANGE UP (ref 6–8.3)
RAPID RVP RESULT: SIGNIFICANT CHANGE UP
RBC # BLD: 3.37 M/UL — LOW (ref 4.2–5.8)
RBC # FLD: 19.6 % — HIGH (ref 10.3–14.5)
SARS-COV-2 RNA SPEC QL NAA+PROBE: SIGNIFICANT CHANGE UP
SODIUM SERPL-SCNC: 133 MMOL/L — LOW (ref 135–145)
SPECIMEN SOURCE: SIGNIFICANT CHANGE UP
WBC # BLD: 9.97 K/UL — SIGNIFICANT CHANGE UP (ref 3.8–10.5)
WBC # FLD AUTO: 9.97 K/UL — SIGNIFICANT CHANGE UP (ref 3.8–10.5)

## 2025-01-25 PROCEDURE — 71045 X-RAY EXAM CHEST 1 VIEW: CPT | Mod: 26

## 2025-01-25 PROCEDURE — 99291 CRITICAL CARE FIRST HOUR: CPT

## 2025-01-25 PROCEDURE — 71045 X-RAY EXAM CHEST 1 VIEW: CPT | Mod: 26,77

## 2025-01-25 RX ORDER — HYDROMORPHONE/SOD CHLOR,ISO/PF 2 MG/10 ML
0.25 SYRINGE (ML) INJECTION ONCE
Refills: 0 | Status: DISCONTINUED | OUTPATIENT
Start: 2025-01-25 | End: 2025-01-25

## 2025-01-25 RX ORDER — ACETAMINOPHEN 500 MG/5ML
1000 LIQUID (ML) ORAL ONCE
Refills: 0 | Status: COMPLETED | OUTPATIENT
Start: 2025-01-25 | End: 2025-01-25

## 2025-01-25 RX ORDER — LIDOCAINE HYDROCHLORIDE 20 MG/ML
15 JELLY TOPICAL ONCE
Refills: 0 | Status: COMPLETED | OUTPATIENT
Start: 2025-01-25 | End: 2025-01-25

## 2025-01-25 RX ORDER — LIDOCAINE HCL/PF 10 MG/ML
8 VIAL (ML) INJECTION ONCE
Refills: 0 | Status: COMPLETED | OUTPATIENT
Start: 2025-01-25 | End: 2025-01-25

## 2025-01-25 RX ORDER — HYDROMORPHONE/SOD CHLOR,ISO/PF 2 MG/10 ML
0.5 SYRINGE (ML) INJECTION ONCE
Refills: 0 | Status: DISCONTINUED | OUTPATIENT
Start: 2025-01-25 | End: 2025-01-25

## 2025-01-25 RX ORDER — ACETAMINOPHEN 500 MG/5ML
1000 LIQUID (ML) ORAL ONCE
Refills: 0 | Status: COMPLETED | OUTPATIENT
Start: 2025-01-25 | End: 2025-01-24

## 2025-01-25 RX ORDER — LIDOCAINE HYDROCHLORIDE 20 MG/ML
1 JELLY TOPICAL DAILY
Refills: 0 | Status: DISCONTINUED | OUTPATIENT
Start: 2025-01-25 | End: 2025-01-31

## 2025-01-25 RX ADMIN — ACETYLCYSTEINE 4 MILLILITER(S): 200 INHALANT RESPIRATORY (INHALATION) at 23:32

## 2025-01-25 RX ADMIN — Medication 0.5 MILLIGRAM(S): at 21:00

## 2025-01-25 RX ADMIN — METHOCARBAMOL 500 MILLIGRAM(S): 500 TABLET, FILM COATED ORAL at 21:26

## 2025-01-25 RX ADMIN — Medication 0.5 MILLIGRAM(S): at 12:42

## 2025-01-25 RX ADMIN — POLYETHYLENE GLYCOL 3350 17 GRAM(S): 17 POWDER, FOR SOLUTION ORAL at 17:19

## 2025-01-25 RX ADMIN — Medication 2.5 MILLIGRAM(S): at 23:32

## 2025-01-25 RX ADMIN — Medication 1 TABLET(S): at 11:29

## 2025-01-25 RX ADMIN — POLYETHYLENE GLYCOL 3350 17 GRAM(S): 17 POWDER, FOR SOLUTION ORAL at 05:22

## 2025-01-25 RX ADMIN — MEROPENEM 100 MILLIGRAM(S): 1 INJECTION INTRAVENOUS at 09:36

## 2025-01-25 RX ADMIN — ACETYLCYSTEINE 4 MILLILITER(S): 200 INHALANT RESPIRATORY (INHALATION) at 11:58

## 2025-01-25 RX ADMIN — Medication 0.5 MILLIGRAM(S): at 20:30

## 2025-01-25 RX ADMIN — INSULIN LISPRO 2: 100 INJECTION, SOLUTION INTRAVENOUS; SUBCUTANEOUS at 17:18

## 2025-01-25 RX ADMIN — Medication 1 APPLICATION(S): at 21:27

## 2025-01-25 RX ADMIN — Medication 40 MILLIGRAM(S): at 11:29

## 2025-01-25 RX ADMIN — Medication 15 MILLILITER(S): at 19:38

## 2025-01-25 RX ADMIN — LIDOCAINE HYDROCHLORIDE 15 MILLILITER(S): 20 JELLY TOPICAL at 12:36

## 2025-01-25 RX ADMIN — GABAPENTIN 100 MILLIGRAM(S): 400 CAPSULE ORAL at 05:22

## 2025-01-25 RX ADMIN — ACETYLCYSTEINE 4 MILLILITER(S): 200 INHALANT RESPIRATORY (INHALATION) at 05:44

## 2025-01-25 RX ADMIN — Medication 1 APPLICATION(S): at 05:22

## 2025-01-25 RX ADMIN — Medication 0.5 MILLIGRAM(S): at 04:35

## 2025-01-25 RX ADMIN — HEPARIN SODIUM 5000 UNIT(S): 1000 INJECTION INTRAVENOUS; SUBCUTANEOUS at 05:23

## 2025-01-25 RX ADMIN — Medication 5 MILLIGRAM(S): at 21:27

## 2025-01-25 RX ADMIN — LINEZOLID 300 MILLIGRAM(S): 2 INJECTION, SOLUTION INTRAVENOUS at 00:33

## 2025-01-25 RX ADMIN — GABAPENTIN 100 MILLIGRAM(S): 400 CAPSULE ORAL at 17:21

## 2025-01-25 RX ADMIN — Medication 0.5 MILLIGRAM(S): at 04:20

## 2025-01-25 RX ADMIN — METHOCARBAMOL 500 MILLIGRAM(S): 500 TABLET, FILM COATED ORAL at 14:27

## 2025-01-25 RX ADMIN — AMIODARONE HYDROCHLORIDE 200 MILLIGRAM(S): 50 INJECTION, SOLUTION INTRAVENOUS at 05:22

## 2025-01-25 RX ADMIN — Medication 400 MILLIGRAM(S): at 06:00

## 2025-01-25 RX ADMIN — HEPARIN SODIUM 5000 UNIT(S): 1000 INJECTION INTRAVENOUS; SUBCUTANEOUS at 21:27

## 2025-01-25 RX ADMIN — ATORVASTATIN CALCIUM 80 MILLIGRAM(S): 80 TABLET, FILM COATED ORAL at 21:26

## 2025-01-25 RX ADMIN — Medication 400 MILLIGRAM(S): at 12:33

## 2025-01-25 RX ADMIN — HEPARIN SODIUM 5000 UNIT(S): 1000 INJECTION INTRAVENOUS; SUBCUTANEOUS at 14:00

## 2025-01-25 RX ADMIN — Medication 0.5 MILLIGRAM(S): at 14:37

## 2025-01-25 RX ADMIN — Medication 1 SPRAY(S): at 19:39

## 2025-01-25 RX ADMIN — Medication 15 MILLILITER(S): at 05:23

## 2025-01-25 RX ADMIN — Medication 1000 MILLIGRAM(S): at 06:15

## 2025-01-25 RX ADMIN — Medication 0.25 MILLIGRAM(S): at 18:33

## 2025-01-25 RX ADMIN — METHOCARBAMOL 500 MILLIGRAM(S): 500 TABLET, FILM COATED ORAL at 05:22

## 2025-01-25 RX ADMIN — Medication 0.5 MILLIGRAM(S): at 13:00

## 2025-01-25 RX ADMIN — Medication 2.5 MILLIGRAM(S): at 11:58

## 2025-01-25 RX ADMIN — Medication 50 MILLIGRAM(S): at 21:27

## 2025-01-25 RX ADMIN — Medication 2.5 MILLIGRAM(S): at 05:44

## 2025-01-25 RX ADMIN — Medication 0.25 MILLIGRAM(S): at 18:50

## 2025-01-25 RX ADMIN — Medication 1000 MILLIGRAM(S): at 13:00

## 2025-01-25 RX ADMIN — Medication 0.5 MILLIGRAM(S): at 15:00

## 2025-01-25 RX ADMIN — MIRTAZAPINE 7.5 MILLIGRAM(S): 30 TABLET, FILM COATED ORAL at 11:29

## 2025-01-25 RX ADMIN — Medication 81 MILLIGRAM(S): at 11:28

## 2025-01-25 RX ADMIN — Medication 500 MILLIGRAM(S): at 11:29

## 2025-01-25 RX ADMIN — LINEZOLID 300 MILLIGRAM(S): 2 INJECTION, SOLUTION INTRAVENOUS at 12:36

## 2025-01-25 NOTE — PROGRESS NOTE ADULT - SUBJECTIVE AND OBJECTIVE BOX
Patient seen and examined at the bedside.    Remained critically ill on continuous ICU monitoring.    OBJECTIVE:  Vital Signs Last 24 Hrs  T(C): 36.8 (25 Jan 2025 04:00), Max: 37 (25 Jan 2025 00:00)  T(F): 98.3 (25 Jan 2025 04:00), Max: 98.6 (25 Jan 2025 00:00)  HR: 75 (25 Jan 2025 07:00) (60 - 91)  BP: 137/68 (25 Jan 2025 07:00) (96/54 - 169/82)  BP(mean): 95 (25 Jan 2025 07:00) (72 - 118)  RR: 16 (25 Jan 2025 07:00) (8 - 45)  SpO2: 100% (25 Jan 2025 07:00) (95% - 100%)    Parameters below as of 25 Jan 2025 06:12  Patient On (Oxygen Delivery Method): HFTC  O2 Flow (L/min): 40  O2 Concentration (%): 40      Physical Exam:   General: Trach to vent   Neurology: intact, off sedation - follows commands and moves all 4 extremities. Writes on white board for communication.   Eyes: bilateral pupils equal and reactive   ENT/Neck: +ETT, Neck supple, trachea midline, No JVD. Trach size no ooze, slightly bloody dressings around trach.    Respiratory: Clear bilaterally   CV: S1S2, no murmurs        [x] Sternal dressing         [x] Sinus rhythm [x] TPM  Abdominal: Soft, NT, ND +BS   Extremities: 1-2+ pedal edema noted, + peripheral pulses   Skin: No Rashes. Sacral DTI.                                       Assessment:  53 yo M s/p CABG x 3 on 12/30/24    S/P Tracheostomy 1/16/25  Postop acute respiratory failure  Acute blood loss anemia  Ascites    ESRD on iHD   E coli bacteremia 2/2 pneumonia        Plan:   ***Neuro***  Post operative neuro assessment   Gabapentin, Robaxin, & Tylenol for pain management  Nightly Trazodone, Remeron, Melatonin     ***Cardiovascular***  Invasive hemodynamic monitoring, assess perfusion indices   SR /  Hct 31.3 % / Lactate 1.0  Continuos reassessment of hemodynamics  PO Amiodarone for Afib prophylaxis   [x] DVT ppx w/ SQ Heparin   [x]  ASA [x] Statin   Serial EKG and cardiac enzymes     ***Pulmonary***  [x] HFTC 40L/40%  Post op vent management   Titration of FiO2 and PEEP, follow SpO2, CXR, blood gasses   Continue bronchodilators as tolerated      Mode: HFTC  FiO2: 40              ***GI***  [x] TF's at goal of 60 ccs  [x] Protonix for stress ulcer ppx    ***Renal***  [x] ESRD on HD   Continue to monitor I/Os, BUN/Creatinine.   Replete lytes PRN  Nephro-Lisette & EPOGEN for renal support  Dialysis MWF, Dialysis yesterday. Due for dialysis 1/27/25.     ***ID***  Sepsis/septic shock due to ESBL E. coli -  Meropenem & Zyvox. - inhaled mary d/gladys  Vanco course completed  Sacral decubitus ulcer - wound care following     ***Endocrine***  [x]  DM2 : HbA1c 5.6%                - [x] ISS              - Need tight glycemic control to prevent wound infection.        Patient requires continuous monitoring with bedside rhythm monitoring, pulse oximetry monitoring, and continuous central venous and arterial pressure monitoring; and intermittent blood gas analysis. Care plan discussed with the ICU care team.   Patient remained critical, at risk for life threatening decompensation.    I have spent 30 minutes providing critical care management to this patient.    By signing my name below, I, Gustavo Hudson, attest that this documentation has been prepared under the direction and in the presence of Jimbo Marquez NP   Electronically signed: Evelio Reyes, 01-25-25 @ 07:14    I, Jimbo Marquez, personally performed the services described in this documentation. all medical record entries made by the scribe were at my direction and in my presence. I have reviewed the chart and agree that the record reflects my personal performance and is accurate and complete  Electronically signed: Jimbo Marquez NP  Patient seen and examined at the bedside.    Remained critically ill on continuous ICU monitoring.    OBJECTIVE:  Vital Signs Last 24 Hrs  T(C): 36.8 (25 Jan 2025 04:00), Max: 37 (25 Jan 2025 00:00)  T(F): 98.3 (25 Jan 2025 04:00), Max: 98.6 (25 Jan 2025 00:00)  HR: 75 (25 Jan 2025 07:00) (60 - 91)  BP: 137/68 (25 Jan 2025 07:00) (96/54 - 169/82)  BP(mean): 95 (25 Jan 2025 07:00) (72 - 118)  RR: 16 (25 Jan 2025 07:00) (8 - 45)  SpO2: 100% (25 Jan 2025 07:00) (95% - 100%)    Parameters below as of 25 Jan 2025 06:12  Patient On (Oxygen Delivery Method): HFTC  O2 Flow (L/min): 40  O2 Concentration (%): 40      Physical Exam:   General: Trach to vent   Neurology: intact, off sedation - follows commands and moves all 4 extremities. Writes on white board for communication.   Eyes: bilateral pupils equal and reactive   ENT/Neck: +ETT, Neck supple, trachea midline, No JVD. Trach size no ooze, slightly bloody dressings around trach.    Respiratory: Clear bilaterally   CV: S1S2, no murmurs        [x] Sternal dressing         [x] Sinus rhythm [x] TPM  Abdominal: Soft, NT, ND +BS   Extremities: 1-2+ pedal edema noted, + peripheral pulses   Skin: No Rashes. Sacral DTI.                                       Assessment:  55 yo M s/p CABG x 3 on 12/30/24    S/P Tracheostomy 1/16/25  Postop acute respiratory failure  Acute blood loss anemia  Ascites    ESRD on iHD   E coli bacteremia 2/2 pneumonia        Plan:   ***Neuro***  Post operative neuro assessment   Gabapentin, Robaxin, & Tylenol for pain management  Nightly Trazodone, Remeron, Melatonin     ***Cardiovascular***  Invasive hemodynamic monitoring, assess perfusion indices   SR /  Hct 31.3 % / Lactate 1.0  Continuos reassessment of hemodynamics  PO Amiodarone for Afib prophylaxis   [x] DVT ppx w/ SQ Heparin   [x]  ASA [x] Statin      ***Pulmonary***  [x] HFTC 40L/40% alternating with PSV 2 hours on and off   Post op vent management   Titration of FiO2 and PEEP, follow SpO2, CXR, blood gasses   Continue bronchodilators as tolerated  Left Pleural tube placement 350 cc initially drained     Mode: HFTC  FiO2: 40              ***GI***  [x] TF's at goal of 60 ccs  [x] Protonix for stress ulcer ppx    ***Renal***  [x] ESRD on HD   Continue to monitor I/Os, BUN/Creatinine.   Replete lytes PRN  Nephro-Lisette & EPOGEN for renal support  Dialysis MWF, Dialysis yesterday. Due for dialysis 1/27/25.     ***ID***  Sepsis/septic shock due to ESBL E. coli -  Meropenem    Zyvox  started for VRE bacteremia   Vanco course completed  Sacral decubitus ulcer - wound care following     ***Endocrine***  [x]  DM2 : HbA1c 5.6%                - [x] ISS              - Need tight glycemic control to prevent wound infection.        Patient requires continuous monitoring with bedside rhythm monitoring, pulse oximetry monitoring, and continuous central venous and arterial pressure monitoring; and intermittent blood gas analysis. Care plan discussed with the ICU care team.   Patient remained critical, at risk for life threatening decompensation.    I have spent 50  minutes providing critical care management to this patient.    By signing my name below, I, Gustavo Hudson, attest that this documentation has been prepared under the direction and in the presence of Jimbo Marquez NP   Electronically signed: Evelio Reyes, 01-25-25 @ 07:14    I, Jimbo Marquez, personally performed the services described in this documentation. all medical record entries made by the shubhamibdarlene were at my direction and in my presence. I have reviewed the chart and agree that the record reflects my personal performance and is accurate and complete  Electronically signed: Jimbo Marquez NP

## 2025-01-25 NOTE — PROGRESS NOTE ADULT - ASSESSMENT
54M with PMH of HTN, HLD, ESRD on HD MWF via LUE AVF, ascites (requiring repeat paracenteses q4-6wks), NICM with moderate LV dysfunction w/ EF 35-40%(2023) and CAD s/p atherectomy + SALLIE to D1(4/11/2024) presents to [facility] transferred from [facility] for CABG eval  Nephro on [facility] for HD    ESRD on HD   Regular schedule MWF   Access LUE AVF  [facility]  Nephrologist Dr. Bailey  Last HD on 12/24  S/p HD on 12/27 12/29, 12/30 for hyperkalemia and 12/31  2 L PUF On 01/02/2024  However hospital course now complicated by Cardiogenic shock now on multiple pressors,   CRRT initiated 01/03, off since 01/08   S/p PUF on 01/09   HD held on 01/10 due to instability,  S/p HD on 01/17, 1/20  S/p HD on 01/22,  S/p HD on 01/24  Next HD likely Monday  Keep MAP>65  Renal Diet  Consent in physical chart    Hyper/Hypokalemia  Monitor K, will change dialysate fluid as needed    HTN  currently on pressure support  monitor bp     CKD MBD  Can send a PTH  Ca and Phos daily    Anemia  CRISTIANE with HD  Transfuse if hgb <7    CAD with multivessel disease  s/p CABG 12/30  CTICU following    Hypoxic Resp failure requiring Trach  Per surgery  S/p brinchoscopy, transplant pulm following

## 2025-01-25 NOTE — PROGRESS NOTE ADULT - SUBJECTIVE AND OBJECTIVE BOX
[facility]    Dr. Nica Styles     Office [phone] (9 am to 5 pm)  Service: [phone] (5pm to 9am)  Also Available on TEAMS      RENAL PROGRESS NOTE: DATE OF SERVICE 01-25-25 @ 14:21    Patient is a 54y old  Male who presents with a chief complaint of MI (24 Jan 2025 10:30)      Patient seen and examined at bedside. No chest pain/sob    VITALS:  T(F): 97.8 (01-25-25 @ 12:00), Max: 98.6 (01-25-25 @ 00:00)  HR: 76 (01-25-25 @ 14:00)  BP: 142/83 (01-25-25 @ 14:00)  RR: 20 (01-25-25 @ 14:00)  SpO2: 100% (01-25-25 @ 14:00)  Wt(kg): --    01-24 @ 07:01  -  01-25 @ 07:00  --------------------------------------------------------  IN: 1970 mL / OUT: 2000 mL / NET: -30 mL    01-25 @ 07:01  -  01-25 @ 14:21  --------------------------------------------------------  IN: 785 mL / OUT: 0 mL / NET: 785 mL          PHYSICAL EXAM:  Constitutional: NAD  Neck: No JVD  Respiratory: CTAB, no wheezes, rales or rhonchi  Cardiovascular: S1, S2, RRR  Gastrointestinal: BS+, soft, NT/ND  Extremities: No peripheral edema    Hospital Medications:   MEDICATIONS  (STANDING):  acetylcysteine 10%  Inhalation 4 milliLiter(s) Inhalation every 6 hours  albuterol    0.083% 2.5 milliGRAM(s) Nebulizer every 6 hours  aMIOdarone    Tablet   Oral   aMIOdarone    Tablet 200 milliGRAM(s) Oral daily  ascorbic acid 500 milliGRAM(s) Oral daily  aspirin  chewable 81 milliGRAM(s) Oral daily  atorvastatin 80 milliGRAM(s) Oral at bedtime  bisacodyl Suppository 10 milliGRAM(s) Rectal once  chlorhexidine 0.12% Liquid 15 milliLiter(s) Oral Mucosa every 12 hours  chlorhexidine 2% Cloths 1 Application(s) Topical daily  chlorhexidine 4% Liquid 1 Application(s) Topical <User Schedule>  dextrose 50% Injectable 50 milliLiter(s) IV Push every 15 minutes  epoetin ignacia (EPOGEN) Injectable 84910 Unit(s) IV Push <User Schedule>  gabapentin 100 milliGRAM(s) Oral every 12 hours  heparin   Injectable 5000 Unit(s) SubCutaneous every 8 hours  HYDROmorphone  Injectable 0.5 milliGRAM(s) IV Push once  insulin lispro (ADMELOG) corrective regimen sliding scale   SubCutaneous every 6 hours  lidocaine 2% Injection for Nebulization 8 milliLiter(s) Nebulizer once  linezolid  IVPB      linezolid  IVPB 600 milliGRAM(s) IV Intermittent every 12 hours  melatonin 5 milliGRAM(s) Oral at bedtime  meropenem  IVPB 500 milliGRAM(s) IV Intermittent every 24 hours  meropenem  IVPB      methocarbamol 500 milliGRAM(s) Oral every 8 hours  mirtazapine 7.5 milliGRAM(s) Oral daily  Nephro-elicia 1 Tablet(s) Oral daily  pantoprazole  Injectable 40 milliGRAM(s) IV Push daily  polyethylene glycol 3350 17 Gram(s) Oral two times a day  sodium chloride 0.65% Nasal 1 Spray(s) Both Nostrils every 12 hours  sodium chloride 0.9%. 1000 milliLiter(s) (10 mL/Hr) IV Continuous <Continuous>  traZODone 50 milliGRAM(s) Oral at bedtime      LABS:  01-25    133[L]  |  95[L]  |  25[H]  ----------------------------<  92  5.0   |  25  |  4.32[H]    Ca    8.6      25 Jan 2025 00:32  Phos  3.7     01-25  Mg     2.2     01-25    TPro  6.3  /  Alb  3.0[L]  /  TBili  0.6  /  DBili      /  AST  64[H]  /  ALT  15  /  AlkPhos  114  01-25    Creatinine Trend: 4.32 <--, 5.64 <--, 4.69 <--, 5.89 <--, 4.86 <--, 4.80 <--, 6.64 <--, 5.37 <--    Albumin: 3.0 g/dL (01-25 @ 00:32)  Phosphorus: 3.7 mg/dL (01-25 @ 00:32)                              9.9    9.97  )-----------( 406      ( 25 Jan 2025 00:32 )             31.3     Urine Studies:  Urinalysis - [01-25-25 @ 00:32]      Color  / Appearance  / SG  / pH       Gluc 92 / Ketone   / Bili  / Urobili        Blood  / Protein  / Leuk Est  / Nitrite       RBC  / WBC  / Hyaline  / Gran  / Sq Epi  / Non Sq Epi  / Bacteria       Iron 29, TIBC 143, %sat 20      [12-19-24 @ 05:00]  Ferritin 904      [12-19-24 @ 05:00]  TSH 4.75      [12-24-24 @ 06:50]        RADIOLOGY & ADDITIONAL STUDIES:

## 2025-01-25 NOTE — PROGRESS NOTE ADULT - SUBJECTIVE AND OBJECTIVE BOX
PATIENT SEEN AND EXAMINED BY JEAN PAUL GABRIEL M.D. ON :- 1/25/25  DATE OF SERVICE:    1/25/25         Interim events noted,Labs ,Radiological studies and Cardiology tests reviewed .    Patient is a 54y old  Male who presents with a chief complaint of MI (24 Jan 2025 10:30)      HPI:  54M with PMH of HTN, HLD, ESRD on HD MWF via LUE AVF, ascites (requiring repeat paracenteses q4-6wks), NICM with moderate LV dysfunction w/ EF 35-40%(2023) and CAD s/p atherectomy + SALLIE to D1(4/11/2024) presents to [facility] transferred from [facility]. Patient initially presented to [facility] ED with shortness of breath. Of note he was recently admitted from 09/27-12/02 for acute cholecystitis s/p cholecystectomy. In Critical [facility] hospital ED, pt was hypoxic to 86% on RA, trop 1.7K, EKG no new changes, CXR fluid overload. Admitted for AHRF 2/2 fluid overload. Pt received HD on 12/18, 12/19, 12/20 and 12/22. DAPT was resumed, TTE showed improvement in LVEF to 40-45%. Stress test was abnormal with 1mm inferior STD, reversible ischemia in the inferior wall and fixed defects in the lateral, anterior and apical walls with post stress EF of 24%. Pt was then transferred to [facility] for cardiac cath which showed pLAD 70% ISR, mLCx 70%, D1 severe dz. Patient now transferred to [facility] CTS Dr. Rivers for CABG eval. Patient denies any current SOB or chest pain on admission. Otherwise denies headaches, abdominal pain, urinary or bowel changes, fevers, chills, N/V/D, sick contacts.  (24 Dec 2024 05:07)      PAST MEDICAL & SURGICAL HISTORY:  Type 2 diabetes mellitus      Hypertension      End stage renal disease  started HD 2/2019 T, Th, Sat via right chest permacath      Bone spur  right shoulder- hx of - sx done      Anemia      Injury of right wrist  hx of at age 14      History of hyperkalemia  before HD- K-6.2 - to repeat in am of sx, pt had HD today      S/P arthroscopy of right shoulder  2010      History of vascular access device  right chest permacath - 2/2019      H/O right wrist surgery  tendons repair - at age 14      AV fistula      H/O ventral hernia repair      S/P cholecystectomy          PREVIOUS DIAGNOSTIC TESTING:      ECHO  FINDINGS:    STRESS  FINDINGS:    CATHETERIZATION  FINDINGS:    MEDICATIONS  (STANDING):  acetylcysteine 10%  Inhalation 4 milliLiter(s) Inhalation every 6 hours  albuterol    0.083% 2.5 milliGRAM(s) Nebulizer every 6 hours  aMIOdarone    Tablet   Oral   aMIOdarone    Tablet 200 milliGRAM(s) Oral daily  ascorbic acid 500 milliGRAM(s) Oral daily  aspirin  chewable 81 milliGRAM(s) Oral daily  atorvastatin 80 milliGRAM(s) Oral at bedtime  bisacodyl Suppository 10 milliGRAM(s) Rectal once  chlorhexidine 0.12% Liquid 15 milliLiter(s) Oral Mucosa every 12 hours  chlorhexidine 2% Cloths 1 Application(s) Topical daily  chlorhexidine 4% Liquid 1 Application(s) Topical <User Schedule>  dextrose 50% Injectable 50 milliLiter(s) IV Push every 15 minutes  epoetin ignacia (EPOGEN) Injectable 30540 Unit(s) IV Push <User Schedule>  gabapentin 100 milliGRAM(s) Oral every 12 hours  heparin   Injectable 5000 Unit(s) SubCutaneous every 8 hours  insulin lispro (ADMELOG) corrective regimen sliding scale   SubCutaneous every 6 hours  lidocaine   4% Patch 1 Patch Transdermal daily  linezolid  IVPB      linezolid  IVPB 600 milliGRAM(s) IV Intermittent every 12 hours  melatonin 5 milliGRAM(s) Oral at bedtime  meropenem  IVPB 500 milliGRAM(s) IV Intermittent every 24 hours  meropenem  IVPB      methocarbamol 500 milliGRAM(s) Oral every 8 hours  mirtazapine 7.5 milliGRAM(s) Oral daily  Nephro-elicia 1 Tablet(s) Oral daily  pantoprazole  Injectable 40 milliGRAM(s) IV Push daily  polyethylene glycol 3350 17 Gram(s) Oral two times a day  sodium chloride 0.65% Nasal 1 Spray(s) Both Nostrils every 12 hours  sodium chloride 0.9%. 1000 milliLiter(s) (10 mL/Hr) IV Continuous <Continuous>  traZODone 50 milliGRAM(s) Oral at bedtime    MEDICATIONS  (PRN):  acetaminophen     Tablet .. 650 milliGRAM(s) Oral every 6 hours PRN Mild Pain (1 - 3)  benzocaine 20% Spray 1 Spray(s) Topical every 6 hours PRN throat pain  lidocaine/prilocaine Cream 1 Application(s) Topical daily PRN pre-HD  sodium chloride 0.9% lock flush 10 milliLiter(s) IV Push every 1 hour PRN Pre/post blood products, medications, blood draw, and to maintain line patency      FAMILY HISTORY:  FH: hypertension  mother, father- alive    FH: type 2 diabetes  mother, father- alive        SOCIAL HISTORY:    CIGARETTES:    ALCOHOL:    REVIEW OF SYSTEMS:  CONSTITUTIONAL: No fever, weight loss, or fatigue  EYES: No eye pain, visual disturbances, or discharge  ENMT:  No difficulty hearing, tinnitus, vertigo; No sinus or throat pain  NECK: No pain or stiffness  RESPIRATORY: No cough, wheezing, chills or hemoptysis; No shortness of breath  CARDIOVASCULAR: No chest pain, palpitations, dizziness, or leg swelling  GASTROINTESTINAL: No abdominal or epigastric pain. No nausea, vomiting, or hematemesis; No diarrhea or constipation. No melena or hematochezia.  GENITOURINARY: No dysuria, frequency, hematuria, or incontinence  NEUROLOGICAL: No headaches, memory loss, loss of strength, numbness, or tremors  SKIN: No itching, burning, rashes, or lesions   LYMPH NODES: No enlarged glands  ENDOCRINE: No heat or cold intolerance; No hair loss  MUSCULOSKELETAL: No joint pain or swelling; No muscle, back, or extremity pain  PSYCHIATRIC: No depression, anxiety, mood swings, or difficulty sleeping  HEME/LYMPH: No easy bruising, or bleeding gums  ALLERY AND IMMUNOLOGIC: No hives or eczema    Vital Signs Last 24 Hrs  T(C): 36.8 (25 Jan 2025 20:00), Max: 37 (25 Jan 2025 00:00)  T(F): 98.3 (25 Jan 2025 20:00), Max: 98.6 (25 Jan 2025 00:00)  HR: 78 (25 Jan 2025 22:00) (60 - 81)  BP: 156/72 (25 Jan 2025 22:00) (96/54 - 170/78)  BP(mean): 104 (25 Jan 2025 22:00) (72 - 112)  RR: 17 (25 Jan 2025 22:00) (13 - 23)  SpO2: 92% (25 Jan 2025 22:00) (92% - 100%)    Parameters below as of 25 Jan 2025 20:00  Patient On (Oxygen Delivery Method): tracheostomy collar    O2 Concentration (%): 40      PHYSICAL EXAM:  GENERAL: NAD, well-groomed, well-developed  HEAD:  Atraumatic, Normocephalic  EYES: EOMI, PERRLA, conjunctiva and sclera clear  ENMT: No tonsillar erythema, exudates, or enlargement; Moist mucous membranes, Good dentition, No lesions  NECK: Supple, No JVD, Normal thyroid  NERVOUS SYSTEM:  Alert & Oriented X3, Good concentration; Motor Strength 5/5 B/L upper and lower extremities; DTRs 2+ intact and symmetric  CHEST/LUNG: Clear to percussion bilaterally; No rales, rhonchi, wheezing, or rubs  HEART: Regular rate and rhythm; No murmurs, rubs, or gallops  ABDOMEN: Soft, Nontender, Nondistended; Bowel sounds present  EXTREMITIES:  2+ Peripheral Pulses, No clubbing, cyanosis, or edema  LYMPH: No lymphadenopathy noted  SKIN: No rashes or lesions      INTERPRETATION OF TELEMETRY:    ECG:    CHADSVASC:     LABS:                        9.9    9.97  )-----------( 406      ( 25 Jan 2025 00:32 )             31.3     01-25    133[L]  |  95[L]  |  25[H]  ----------------------------<  92  5.0   |  25  |  4.32[H]    Ca    8.6      25 Jan 2025 00:32  Phos  3.7     01-25  Mg     2.2     01-25    TPro  6.3  /  Alb  3.0[L]  /  TBili  0.6  /  DBili  x   /  AST  64[H]  /  ALT  15  /  AlkPhos  114  01-25          Urinalysis Basic - ( 25 Jan 2025 00:32 )    Color: x / Appearance: x / SG: x / pH: x  Gluc: 92 mg/dL / Ketone: x  / Bili: x / Urobili: x   Blood: x / Protein: x / Nitrite: x   Leuk Esterase: x / RBC: x / WBC x   Sq Epi: x / Non Sq Epi: x / Bacteria: x      Lipid Panel:   I&O's Summary    24 Jan 2025 07:01  -  25 Jan 2025 07:00  --------------------------------------------------------  IN: 1970 mL / OUT: 2000 mL / NET: -30 mL    25 Jan 2025 07:01  -  25 Jan 2025 22:17  --------------------------------------------------------  IN: 1365 mL / OUT: 350 mL / NET: 1015 mL        RADIOLOGY & ADDITIONAL STUDIES:    < from: TTE W or WO Ultrasound Enhancing Agent (01.19.25 @ 09:06) >    TRANSTHORACIC ECHOCARDIOGRAM REPORT  ________________________________________________________________________________                                      _______       Pt. Name:       ALAINA CANO Study Date:    1/19/2025  MRN:            [id number]       YOB: 1970  Accession #:    [id number]        Age:           54 years  Account#:       [id number]     Gender:        M  Heart Rate:                      Height:        71.00 in (180.34 cm)  Rhythm:Weight:        187.00 lb (84.82 kg)  Blood Pressure: 120/50 mmHg      BSA/BMI:       2.05 m² / 26.08 kg/m²  ________________________________________________________________________________________  Referring Physician:    [id number] Karan Rivers  Interpreting Physician: Bozena Suh MD  Primary Sonographer:    Layla MCLEAN    CPT:               ECHO TTE W/O CON F/U LTD - 06004.m  Indication(s):     Malignant pericardial effusion in diseases classified                     elsewhere - I31.31  Procedure:         Limited transthoracic echocardiogram.  Ordering Location: [facility]  Admission Status:  Inpatient  Study Information: Image quality for this study is technically difficult.    _______________________________________________________________________________________     CONCLUSIONS:      1. Technically difficult image quality.   2. No pericardial effusion seen.    ________________________________________________________________________________________  FINDINGS:     Pericardium:  No pericardial effusion seen.  ________________________________________________________________________________________  Electronically signed on 1/19/2025 at 4:42:24 PM by Bozena Suh MD      *** Final ***    < end of copied text >

## 2025-01-25 NOTE — PROGRESS NOTE ADULT - ASSESSMENT
54M with PMH of HTN, HLD, ESRD on HD MWF via LUE AVF, ascites (requiring repeat paracenteses q4-6wks), NICM with moderate LV dysfunction w/ EF 35-40%() and CAD s/p atherectomy + SALLIE to D1(2024) presents to [facility] transferred from [facility]. Patient initially presented to Critical access hospital ED with shortness of breath. Of note he was recently admitted from - for acute cholecystitis s/p cholecystectomy. In Critical access hospital ED, pt was hypoxic to 86% on RA, trop 1.7K, EKG no new changes, CXR fluid overload. Admitted for AHRF 2/2 fluid overload. Pt received HD on , ,  and . DAPT was resumed, TTE showed improvement in LVEF to 40-45%. Stress test was abnormal with 1mm inferior STD, reversible ischemia in the inferior wall and fixed defects in the lateral, anterior and apical walls with post stress EF of 24%.     Pt was then transferred to [facility] for cardiac cath which showed pLAD 70% ISR, mLCx 70%, D1 severe dz.     Patient now transferred to [facility] CTS Dr. Rivers for CABG eval.# CAD s/p NSTEMI  Hx CAD s/p PCI LAD   Presented with ADHF elevated troponins  Positive NST1-Cath- pLAD 70% ISR, mLCx 70%, D1 severe dz.   TTE EF 35% Severe TRWas on Plavix-p2y12- 321 been off Plavix since -POD#1-C3L free LIMA-LAD; SVG-Diag; GYW-undrPK-Xykutivka on  Sinus rhythm,Amio for AF prophylaxis  -OOB off  Sinus rhythm   -POD#3-On /pressors-EF 25-30% RV failure with transaminitis-Sinus Ywedxh30/03-POD#4-Was intubated for hypoxia-gradual hypotension on /pressors had IABP inserted this morning  -POD#5-s/p IABP insertion, sepsis/septic shock due to GNR/ECOLI HD cath placed   Bronched yesterday 1/3 - minimal secretions with rust-tinged sputum   ESBL-E Coli bacteremia on meropenam    - POD#7 Atrial Fib ,remains intubated weaning IABP 1:3,off pressors on CRRT  -IABP removed.Remains on vent-Alex 10ppm,off Levo- CVP 10 / MAP 71 / Hct 25.6% / Lactate 1.7-Atrial Fibrillation  -Intubated on Alex 10ppm, gtt, BP better-AF~~90's on Amio-had bronch still febrile Tmax 45330/09-Extubated awake alert on HFNC AF,on Dobutrex-CRRT,Cultures no growth    01/10-OOB on BiPAP Sinus Rhythm on -SR/ MAP 57/ CVP 10/  Hct 26.7 / Lact 1.4  -s/p EDU/DCCV-Sinus rhythm on -SR / MAP 52 / CVP 12/ Hct 25.8 / Lact 0.7-had bronch with BAL Left lung  -Sinus rhythm on -for repeat Bronch-SR / MAP 67 / CVP 11 / Hct 27.4% / Lact 0.8  -s/p Trach on  TTE EF 55%-SR / CVP 2 / MAP 63 / Hct 25.9% / Lactate 0.9  -Awake on Trach collar off  c/o buttock pain had bronch yesterday-BAL-Sinus rhythm  -Sinus rhythm on vent at night-BP stable may need to increase hydrallazine 25 mg q8      # Acute on Chronic Systolic Heart Failure now improved  EF-35% ,severe TR  Had HD post op  GDMT post op  LVEF improved post bypass but now at 23-30%-BiV dysfunction on  now off pressors  -TTE EF 40%,trace effusion  ECG c/w pericarditis-was on colchicine   01/15-TTE EF 55%    Vascular evaluated  AVGraft /?steal causing RV failure-'' Very low suspicion of steal Sd and AVF causing current clinical state. ''   VA Duplex Hemodialysis Access, Left (25 @ 13:35) >   Arterial flow volume of 1301 mL/m, and the venous flow volume of  1492 mL/m are consistent with a normally functioning dialysis access  fistula.      # Ascites  Hx chronic ascites  Has drainage q4-6 weeks  Last drained -4600mL  ? ascites related to severe TR  Had uwboqejtkzsz-Mebhptf-kv growth   CT Abdomen 01/10-Large volume ascites-consider paracentesis  Monitor      # ESRD  Now off CRRT transition to HD     # Sacral Decubitus  Seen by Plastics and Gen Surgery

## 2025-01-26 LAB
ALBUMIN SERPL ELPH-MCNC: 2.8 G/DL — LOW (ref 3.3–5)
ALP SERPL-CCNC: 129 U/L — HIGH (ref 40–120)
ALT FLD-CCNC: 13 U/L — SIGNIFICANT CHANGE UP (ref 10–45)
ANION GAP SERPL CALC-SCNC: 13 MMOL/L — SIGNIFICANT CHANGE UP (ref 5–17)
AST SERPL-CCNC: 21 U/L — SIGNIFICANT CHANGE UP (ref 10–40)
BASOPHILS # BLD AUTO: 0.08 K/UL — SIGNIFICANT CHANGE UP (ref 0–0.2)
BASOPHILS NFR BLD AUTO: 0.7 % — SIGNIFICANT CHANGE UP (ref 0–2)
BILIRUB SERPL-MCNC: 0.4 MG/DL — SIGNIFICANT CHANGE UP (ref 0.2–1.2)
BUN SERPL-MCNC: 38 MG/DL — HIGH (ref 7–23)
CALCIUM SERPL-MCNC: 8.4 MG/DL — SIGNIFICANT CHANGE UP (ref 8.4–10.5)
CHLORIDE SERPL-SCNC: 94 MMOL/L — LOW (ref 96–108)
CO2 SERPL-SCNC: 26 MMOL/L — SIGNIFICANT CHANGE UP (ref 22–31)
CREAT SERPL-MCNC: 5.22 MG/DL — HIGH (ref 0.5–1.3)
EGFR: 12 ML/MIN/1.73M2 — LOW
EGFR: 12 ML/MIN/1.73M2 — LOW
EOSINOPHIL # BLD AUTO: 0.98 K/UL — HIGH (ref 0–0.5)
EOSINOPHIL NFR BLD AUTO: 8.6 % — HIGH (ref 0–6)
GLUCOSE SERPL-MCNC: 90 MG/DL — SIGNIFICANT CHANGE UP (ref 70–99)
HCT VFR BLD CALC: 32.3 % — LOW (ref 39–50)
HGB BLD-MCNC: 10 G/DL — LOW (ref 13–17)
IMM GRANULOCYTES NFR BLD AUTO: 0.6 % — SIGNIFICANT CHANGE UP (ref 0–0.9)
LYMPHOCYTES # BLD AUTO: 0.7 K/UL — LOW (ref 1–3.3)
LYMPHOCYTES # BLD AUTO: 6.1 % — LOW (ref 13–44)
MAGNESIUM SERPL-MCNC: 2.3 MG/DL — SIGNIFICANT CHANGE UP (ref 1.6–2.6)
MCHC RBC-ENTMCNC: 29.2 PG — SIGNIFICANT CHANGE UP (ref 27–34)
MCHC RBC-ENTMCNC: 31 G/DL — LOW (ref 32–36)
MCV RBC AUTO: 94.4 FL — SIGNIFICANT CHANGE UP (ref 80–100)
MONOCYTES # BLD AUTO: 1.04 K/UL — HIGH (ref 0–0.9)
MONOCYTES NFR BLD AUTO: 9.1 % — SIGNIFICANT CHANGE UP (ref 2–14)
NEUTROPHILS # BLD AUTO: 8.52 K/UL — HIGH (ref 1.8–7.4)
NEUTROPHILS NFR BLD AUTO: 74.9 % — SIGNIFICANT CHANGE UP (ref 43–77)
NRBC # BLD: 0 /100 WBCS — SIGNIFICANT CHANGE UP (ref 0–0)
NRBC BLD-RTO: 0 /100 WBCS — SIGNIFICANT CHANGE UP (ref 0–0)
PLATELET # BLD AUTO: 365 K/UL — SIGNIFICANT CHANGE UP (ref 150–400)
POTASSIUM SERPL-MCNC: 4.1 MMOL/L — SIGNIFICANT CHANGE UP (ref 3.5–5.3)
POTASSIUM SERPL-SCNC: 4.1 MMOL/L — SIGNIFICANT CHANGE UP (ref 3.5–5.3)
PROT SERPL-MCNC: 6.1 G/DL — SIGNIFICANT CHANGE UP (ref 6–8.3)
RBC # BLD: 3.42 M/UL — LOW (ref 4.2–5.8)
RBC # FLD: 19.1 % — HIGH (ref 10.3–14.5)
SODIUM SERPL-SCNC: 133 MMOL/L — LOW (ref 135–145)
WBC # BLD: 11.39 K/UL — HIGH (ref 3.8–10.5)
WBC # FLD AUTO: 11.39 K/UL — HIGH (ref 3.8–10.5)

## 2025-01-26 PROCEDURE — 99291 CRITICAL CARE FIRST HOUR: CPT

## 2025-01-26 PROCEDURE — 71250 CT THORAX DX C-: CPT | Mod: 26

## 2025-01-26 PROCEDURE — 71045 X-RAY EXAM CHEST 1 VIEW: CPT | Mod: 26

## 2025-01-26 PROCEDURE — 93010 ELECTROCARDIOGRAM REPORT: CPT

## 2025-01-26 PROCEDURE — 71045 X-RAY EXAM CHEST 1 VIEW: CPT | Mod: 26,77

## 2025-01-26 RX ORDER — ONDANSETRON HCL/PF 4 MG/2 ML
4 VIAL (ML) INJECTION ONCE
Refills: 0 | Status: COMPLETED | OUTPATIENT
Start: 2025-01-26 | End: 2025-01-26

## 2025-01-26 RX ORDER — DOBUTAMINE 250 MG/20ML
1 INJECTION INTRAVENOUS
Qty: 500 | Refills: 0 | Status: DISCONTINUED | OUTPATIENT
Start: 2025-01-26 | End: 2025-01-27

## 2025-01-26 RX ORDER — HYDROMORPHONE/SOD CHLOR,ISO/PF 2 MG/10 ML
0.5 SYRINGE (ML) INJECTION ONCE
Refills: 0 | Status: DISCONTINUED | OUTPATIENT
Start: 2025-01-26 | End: 2025-01-26

## 2025-01-26 RX ORDER — HYDROMORPHONE/SOD CHLOR,ISO/PF 2 MG/10 ML
0.25 SYRINGE (ML) INJECTION ONCE
Refills: 0 | Status: DISCONTINUED | OUTPATIENT
Start: 2025-01-26 | End: 2025-01-26

## 2025-01-26 RX ORDER — ACETAMINOPHEN 500 MG/5ML
1000 LIQUID (ML) ORAL ONCE
Refills: 0 | Status: COMPLETED | OUTPATIENT
Start: 2025-01-26 | End: 2025-01-26

## 2025-01-26 RX ORDER — BENZOCAINE 220 MG/G
1 SPRAY, METERED PERIODONTAL ONCE
Refills: 0 | Status: COMPLETED | OUTPATIENT
Start: 2025-01-26 | End: 2025-01-26

## 2025-01-26 RX ADMIN — Medication 5 MILLIGRAM(S): at 12:45

## 2025-01-26 RX ADMIN — Medication 2.5 MILLIGRAM(S): at 17:19

## 2025-01-26 RX ADMIN — HEPARIN SODIUM 5000 UNIT(S): 1000 INJECTION INTRAVENOUS; SUBCUTANEOUS at 05:58

## 2025-01-26 RX ADMIN — Medication 1 TABLET(S): at 12:06

## 2025-01-26 RX ADMIN — Medication 0.5 MILLIGRAM(S): at 10:15

## 2025-01-26 RX ADMIN — Medication 1 APPLICATION(S): at 05:59

## 2025-01-26 RX ADMIN — ACETYLCYSTEINE 4 MILLILITER(S): 200 INHALANT RESPIRATORY (INHALATION) at 11:54

## 2025-01-26 RX ADMIN — Medication 2.5 MILLIGRAM(S): at 23:03

## 2025-01-26 RX ADMIN — Medication 1000 MILLIGRAM(S): at 06:45

## 2025-01-26 RX ADMIN — Medication 5 MILLIGRAM(S): at 21:10

## 2025-01-26 RX ADMIN — Medication 40 MILLIGRAM(S): at 12:06

## 2025-01-26 RX ADMIN — Medication 50 MILLIGRAM(S): at 21:11

## 2025-01-26 RX ADMIN — POLYETHYLENE GLYCOL 3350 17 GRAM(S): 17 POWDER, FOR SOLUTION ORAL at 05:59

## 2025-01-26 RX ADMIN — Medication 0.5 MILLIGRAM(S): at 03:00

## 2025-01-26 RX ADMIN — ATORVASTATIN CALCIUM 80 MILLIGRAM(S): 80 TABLET, FILM COATED ORAL at 21:12

## 2025-01-26 RX ADMIN — Medication 2.5 MILLIGRAM(S): at 05:05

## 2025-01-26 RX ADMIN — METHOCARBAMOL 500 MILLIGRAM(S): 500 TABLET, FILM COATED ORAL at 21:11

## 2025-01-26 RX ADMIN — Medication 0.5 MILLIGRAM(S): at 14:15

## 2025-01-26 RX ADMIN — LINEZOLID 300 MILLIGRAM(S): 2 INJECTION, SOLUTION INTRAVENOUS at 00:29

## 2025-01-26 RX ADMIN — HEPARIN SODIUM 5000 UNIT(S): 1000 INJECTION INTRAVENOUS; SUBCUTANEOUS at 21:11

## 2025-01-26 RX ADMIN — METHOCARBAMOL 500 MILLIGRAM(S): 500 TABLET, FILM COATED ORAL at 05:59

## 2025-01-26 RX ADMIN — AMIODARONE HYDROCHLORIDE 200 MILLIGRAM(S): 50 INJECTION, SOLUTION INTRAVENOUS at 05:58

## 2025-01-26 RX ADMIN — Medication 5 MILLIGRAM(S): at 13:10

## 2025-01-26 RX ADMIN — Medication 4 MILLIGRAM(S): at 12:18

## 2025-01-26 RX ADMIN — MEROPENEM 100 MILLIGRAM(S): 1 INJECTION INTRAVENOUS at 09:56

## 2025-01-26 RX ADMIN — Medication 15 MILLILITER(S): at 17:48

## 2025-01-26 RX ADMIN — Medication 400 MILLIGRAM(S): at 06:30

## 2025-01-26 RX ADMIN — Medication 0.25 MILLIGRAM(S): at 21:12

## 2025-01-26 RX ADMIN — LIDOCAINE HYDROCHLORIDE 1 PATCH: 20 JELLY TOPICAL at 12:07

## 2025-01-26 RX ADMIN — Medication 500 MILLIGRAM(S): at 12:06

## 2025-01-26 RX ADMIN — BENZOCAINE 1 SPRAY(S): 220 SPRAY, METERED PERIODONTAL at 16:00

## 2025-01-26 RX ADMIN — METHOCARBAMOL 500 MILLIGRAM(S): 500 TABLET, FILM COATED ORAL at 15:05

## 2025-01-26 RX ADMIN — Medication 0.5 MILLIGRAM(S): at 14:30

## 2025-01-26 RX ADMIN — Medication 650 MILLIGRAM(S): at 12:36

## 2025-01-26 RX ADMIN — GABAPENTIN 100 MILLIGRAM(S): 400 CAPSULE ORAL at 17:48

## 2025-01-26 RX ADMIN — GABAPENTIN 100 MILLIGRAM(S): 400 CAPSULE ORAL at 05:59

## 2025-01-26 RX ADMIN — Medication 1 APPLICATION(S): at 21:12

## 2025-01-26 RX ADMIN — INSULIN LISPRO 6: 100 INJECTION, SOLUTION INTRAVENOUS; SUBCUTANEOUS at 05:58

## 2025-01-26 RX ADMIN — MIRTAZAPINE 7.5 MILLIGRAM(S): 30 TABLET, FILM COATED ORAL at 12:06

## 2025-01-26 RX ADMIN — Medication 0.5 MILLIGRAM(S): at 10:00

## 2025-01-26 RX ADMIN — ACETYLCYSTEINE 4 MILLILITER(S): 200 INHALANT RESPIRATORY (INHALATION) at 05:05

## 2025-01-26 RX ADMIN — Medication 0.25 MILLIGRAM(S): at 21:27

## 2025-01-26 RX ADMIN — HEPARIN SODIUM 5000 UNIT(S): 1000 INJECTION INTRAVENOUS; SUBCUTANEOUS at 16:52

## 2025-01-26 RX ADMIN — Medication 650 MILLIGRAM(S): at 12:06

## 2025-01-26 RX ADMIN — LIDOCAINE HYDROCHLORIDE 1 PATCH: 20 JELLY TOPICAL at 20:05

## 2025-01-26 RX ADMIN — Medication 2.5 MILLIGRAM(S): at 11:53

## 2025-01-26 RX ADMIN — ACETYLCYSTEINE 4 MILLILITER(S): 200 INHALANT RESPIRATORY (INHALATION) at 23:04

## 2025-01-26 RX ADMIN — LINEZOLID 300 MILLIGRAM(S): 2 INJECTION, SOLUTION INTRAVENOUS at 12:07

## 2025-01-26 RX ADMIN — Medication 0.5 MILLIGRAM(S): at 03:15

## 2025-01-26 RX ADMIN — Medication 81 MILLIGRAM(S): at 12:07

## 2025-01-26 NOTE — PROGRESS NOTE ADULT - SUBJECTIVE AND OBJECTIVE BOX
[facility]    Dr. Nica Styles     Office [phone] (9 am to 5 pm)  Service: [phone] (5pm to 9am)  Also Available on TEAMS      RENAL PROGRESS NOTE: DATE OF SERVICE 01-26-25 @ 08:48    Patient is a 54y old  Male who presents with a chief complaint of MI (24 Jan 2025 10:30)      Patient seen and examined at bedside. No chest pain/sob    VITALS:  T(F): 97.7 (01-26-25 @ 08:00), Max: 98.3 (01-25-25 @ 20:00)  HR: 82 (01-26-25 @ 08:00)  BP: 155/72 (01-26-25 @ 08:00)  RR: 21 (01-26-25 @ 08:00)  SpO2: 97% (01-26-25 @ 08:00)  Wt(kg): --    01-25 @ 07:01  -  01-26 @ 07:00  --------------------------------------------------------  IN: 2430 mL / OUT: 350 mL / NET: 2080 mL    01-26 @ 07:01  -  01-26 @ 08:48  --------------------------------------------------------  IN: 65 mL / OUT: 0 mL / NET: 65 mL          PHYSICAL EXAM:  Constitutional: NAD  Neck: No JVD  Respiratory: CTAB, no wheezes, rales or rhonchi  Cardiovascular: S1, S2, RRR  Gastrointestinal: BS+, soft, NT/ND  Extremities: No peripheral edema    Hospital Medications:   MEDICATIONS  (STANDING):  acetylcysteine 10%  Inhalation 4 milliLiter(s) Inhalation every 6 hours  albuterol    0.083% 2.5 milliGRAM(s) Nebulizer every 6 hours  aMIOdarone    Tablet   Oral   aMIOdarone    Tablet 200 milliGRAM(s) Oral daily  ascorbic acid 500 milliGRAM(s) Oral daily  aspirin  chewable 81 milliGRAM(s) Oral daily  atorvastatin 80 milliGRAM(s) Oral at bedtime  bisacodyl Suppository 10 milliGRAM(s) Rectal once  chlorhexidine 0.12% Liquid 15 milliLiter(s) Oral Mucosa every 12 hours  chlorhexidine 2% Cloths 1 Application(s) Topical daily  chlorhexidine 4% Liquid 1 Application(s) Topical <User Schedule>  dextrose 50% Injectable 50 milliLiter(s) IV Push every 15 minutes  epoetin ignacia (EPOGEN) Injectable 42786 Unit(s) IV Push <User Schedule>  gabapentin 100 milliGRAM(s) Oral every 12 hours  heparin   Injectable 5000 Unit(s) SubCutaneous every 8 hours  insulin lispro (ADMELOG) corrective regimen sliding scale   SubCutaneous every 6 hours  lidocaine   4% Patch 1 Patch Transdermal daily  linezolid  IVPB      linezolid  IVPB 600 milliGRAM(s) IV Intermittent every 12 hours  melatonin 5 milliGRAM(s) Oral at bedtime  meropenem  IVPB 500 milliGRAM(s) IV Intermittent every 24 hours  meropenem  IVPB      methocarbamol 500 milliGRAM(s) Oral every 8 hours  mirtazapine 7.5 milliGRAM(s) Oral daily  Nephro-elicia 1 Tablet(s) Oral daily  pantoprazole  Injectable 40 milliGRAM(s) IV Push daily  polyethylene glycol 3350 17 Gram(s) Oral two times a day  sodium chloride 0.65% Nasal 1 Spray(s) Both Nostrils every 12 hours  sodium chloride 0.9%. 1000 milliLiter(s) (10 mL/Hr) IV Continuous <Continuous>  traZODone 50 milliGRAM(s) Oral at bedtime      LABS:  01-26    133[L]  |  94[L]  |  38[H]  ----------------------------<  90  4.1   |  26  |  5.22[H]    Ca    8.4      26 Jan 2025 00:28  Phos  4.7     01-26  Mg     2.3     01-26    TPro  6.1  /  Alb  2.8[L]  /  TBili  0.4  /  DBili      /  AST  21  /  ALT  13  /  AlkPhos  129[H]  01-26    Creatinine Trend: 5.22 <--, 4.32 <--, 5.64 <--, 4.69 <--, 5.89 <--, 4.86 <--, 4.80 <--, 6.64 <--    Albumin: 2.8 g/dL (01-26 @ 00:28)  Phosphorus: 4.7 mg/dL (01-26 @ 00:28)                              10.0   11.39 )-----------( 365      ( 26 Jan 2025 00:27 )             32.3     Urine Studies:  Urinalysis - [01-26-25 @ 00:28]      Color  / Appearance  / SG  / pH       Gluc 90 / Ketone   / Bili  / Urobili        Blood  / Protein  / Leuk Est  / Nitrite       RBC  / WBC  / Hyaline  / Gran  / Sq Epi  / Non Sq Epi  / Bacteria       Iron 29, TIBC 143, %sat 20      [12-19-24 @ 05:00]  Ferritin 904      [12-19-24 @ 05:00]  TSH 4.75      [12-24-24 @ 06:50]        RADIOLOGY & ADDITIONAL STUDIES:

## 2025-01-26 NOTE — PROGRESS NOTE ADULT - SUBJECTIVE AND OBJECTIVE BOX
Patient seen and examined at the bedside.    Remained critically ill on continuous ICU monitoring.    OBJECTIVE:  Vital Signs Last 24 Hrs  T(C): 36.5 (26 Jan 2025 08:00), Max: 36.8 (25 Jan 2025 16:00)  T(F): 97.7 (26 Jan 2025 08:00), Max: 98.3 (25 Jan 2025 20:00)  HR: 80 (26 Jan 2025 09:04) (66 - 82)  BP: 144/71 (26 Jan 2025 09:00) (126/69 - 212/94)  BP(mean): 102 (26 Jan 2025 09:00) (91 - 135)  RR: 19 (26 Jan 2025 09:00) (13 - 23)  SpO2: 97% (26 Jan 2025 09:04) (92% - 100%)    Parameters below as of 26 Jan 2025 08:00  Patient On (Oxygen Delivery Method): HFTC    Physical Exam:   General: Trach to vent   Neurology: intact, off sedation - follows commands and moves all 4 extremities. Writes on white board for communication.   Eyes: bilateral pupils equal and reactive   ENT/Neck: +ETT, Neck supple, trachea midline, No JVD. Trach size no ooze, slightly bloody dressings around trach.    Respiratory: Clear bilaterally   CV: S1S2, no murmurs        [x] Sternal dressing         [x] Sinus rhythm [x] TPM  Abdominal: Soft, NT, ND +BS   Extremities: 1-2+ pedal edema noted, + peripheral pulses   Skin: No Rashes. Sacral DTI.                                       Assessment:  55 yo M s/p CABG x 3 on 12/30/24    S/P Tracheostomy 1/16/25  Postop acute respiratory failure  Acute blood loss anemia  Ascites    ESRD on iHD   E coli bacteremia 2/2 pneumonia        Plan:   ***Neuro***  Post operative neuro assessment   Gabapentin, Robaxin, & Tylenol for pain management  Nightly Trazodone, Remeron, Melatonin     ***Cardiovascular***  Invasive hemodynamic monitoring, assess perfusion indices   SR /  Hct 32.3 % / Lactate 1.0  Continuos reassessment of hemodynamics  PO Amiodarone for Afib prophylaxis   [x] DVT ppx w/ SQ Heparin   [x]  ASA [x] Statin      ***Pulmonary***  [x] HFTC 40L/40% alternating with PSV 2 hours on and off   Post op vent management   Titration of FiO2 and PEEP, follow SpO2, CXR, blood gasses   Continue bronchodilators as tolerated  Left Pleural tube placement 350 cc initially drained       ***GI***  [x] TF's at goal of 60 ccs  [x] Protonix for stress ulcer prophylaxis  Bowel regimen with Miralax     ***Renal***  [x] ESRD on HD   Continue to monitor I/Os, BUN/Creatinine.   Replete lytes PRN  Nephro-Lisette & EPOGEN for renal support  Dialysis MWF, Dialysis yesterday. Due for dialysis 1/27/25.     ***ID***  Sepsis/septic shock due to ESBL E. coli -  Meropenem    Zyvox  started for VRE bacteremia   Vanco course completed  Sacral decubitus ulcer - wound care following     ***Endocrine***  [x]  DM2 : HbA1c 5.6%                - [x] ISS              - Need tight glycemic control to prevent wound infection.            Patient requires continuous monitoring with bedside rhythm monitoring, pulse oximetry monitoring, and continuous central venous and arterial pressure monitoring; and intermittent blood gas analysis. Care plan discussed with the ICU care team.   Patient remained critical, at risk for life threatening decompensation.    I have spent 30 minutes providing critical care management to this patient.    By signing my name below, I, Sonia Sánchez, attest that this documentation has been prepared under the direction and in the presence of MYRA Aranda.   Electronically signed: Evelio Gomez, 01-26-25 @ 09:29    I, Halima Garibay_ , personally performed the services described in this documentation. all medical record entries made by the shubhamibdarlene were at my direction and in my presence. I have reviewed the chart and agree that the record reflects my personal performance and is accurate and complete  Electronically signed: MYRA Aranda.  Patient seen and examined at the bedside.    Remained critically ill on continuous ICU monitoring.    OBJECTIVE:  Vital Signs Last 24 Hrs  T(C): 36.5 (26 Jan 2025 08:00), Max: 36.8 (25 Jan 2025 16:00)  T(F): 97.7 (26 Jan 2025 08:00), Max: 98.3 (25 Jan 2025 20:00)  HR: 80 (26 Jan 2025 09:04) (66 - 82)  BP: 144/71 (26 Jan 2025 09:00) (126/69 - 212/94)  BP(mean): 102 (26 Jan 2025 09:00) (91 - 135)  RR: 19 (26 Jan 2025 09:00) (13 - 23)  SpO2: 97% (26 Jan 2025 09:04) (92% - 100%)    Parameters below as of 26 Jan 2025 08:00  Patient On (Oxygen Delivery Method): HFTC    Physical Exam:   General: oob, in chair, in NAD  Neurology: intact, off sedation - follows commands and moves all 4 extremities. Writes on white board for communication.   Eyes: bilateral pupils equal and reactive   ENT/Neck: +trach, Neck supple, trachea midline, No JVD. Trach dressing C/D/I, + secretions w/ poor cough effort  Respiratory: diminished more on Left side vs Right. No cough/wheeze.  CV: S1S2, no murmurs        [x] Sternal dressing         [x] Sinus rhythm [x] TPM  Abdominal: Soft, NT, ND +BS   Extremities: no pedal edema noted, + peripheral pulses   Skin: No Rashes. Sacral DTI. L sided pigtail secured in place w/ dressing C/D/I                                       Assessment:  53 yo M s/p CABG x 3 on 12/30/24    S/P Tracheostomy 1/16/25  Postop acute respiratory failure  Acute blood loss anemia  Ascites    ESRD on iHD   E coli bacteremia 2/2 pneumonia        Plan:   ***Neuro***  Post operative neuro assessment   Gabapentin, Robaxin, & Tylenol for pain management, additional dilaudid as needed  Nightly Trazodone, Remeron, Melatonin   OOBTC and walk as tolerated    ***Cardiovascular***  Invasive hemodynamic monitoring, assess perfusion indices   SR /  Hct 32.3 % / Lactate 1.0  Continuos reassessment of hemodynamics  PO Amiodarone for Afib prophylaxis   [x] DVT ppx w/ SQ Heparin   [x]  ASA [x] Statin      ***Pulmonary***  [x] TC during day, PA/C vent rest overnight  Post op vent management   Titration of FiO2 and PEEP, follow SpO2, CXR, blood gasses   Continue bronchodilators as tolerated  Left Pleural tube placement 350 cc initially drained   F/U CT Chest today - possible pigtail removal after    ***GI***  [x] TF's at goal of 60 ccs  [x] Protonix for stress ulcer prophylaxis  Bowel regimen with Miralax     ***Renal***  [x] ESRD on HD   Continue to monitor I/Os, BUN/Creatinine.   Replete lytes PRN  Nephro-Lisette & EPOGEN for renal support  Dialysis MWF, Dialysis yesterday. Due for dialysis 1/27/25.     ***ID***  Sepsis/septic shock due to ESBL E. coli -  Meropenem    Zyvox  started for VRE bacteremia   Vanco course completed  Sacral decubitus ulcer - wound care following     ***Endocrine***  [x]  DM2 : HbA1c 5.6%                - [x] ISS              - Need tight glycemic control to prevent wound infection.            Patient requires continuous monitoring with bedside rhythm monitoring, pulse oximetry monitoring, and continuous central venous and arterial pressure monitoring; and intermittent blood gas analysis. Care plan discussed with the ICU care team.   Patient remained critical, at risk for life threatening decompensation.    I have spent 30 minutes providing critical care management to this patient.    By signing my name below, I, Sonia Sánchez, attest that this documentation has been prepared under the direction and in the presence of MYRA Aranda.   Electronically signed: Autumn Gomez, 01-26-25 @ 09:29    I, Halima Garibay_ , personally performed the services described in this documentation. all medical record entries made by the autumn were at my direction and in my presence. I have reviewed the chart and agree that the record reflects my personal performance and is accurate and complete  Electronically signed: MYRA Aranda.

## 2025-01-26 NOTE — PROGRESS NOTE ADULT - ASSESSMENT
54M with PMH of HTN, HLD, ESRD on HD MWF via LUE AVF, ascites (requiring repeat paracenteses q4-6wks), NICM with moderate LV dysfunction w/ EF 35-40%(2023) and CAD s/p atherectomy + SALLIE to D1(4/11/2024) presents to [facility] transferred from [facility] for CABG eval  Nephro on [facility] for HD    ESRD on HD   Regular schedule MWF   Access LUE AVF  [facility]  Nephrologist Dr. Bailey  Last HD on 12/24  S/p HD on 12/27 12/29, 12/30 for hyperkalemia and 12/31  2 L PUF On 01/02/2024  However hospital course now complicated by Cardiogenic shock now on multiple pressors,   CRRT initiated 01/03, off since 01/08   S/p PUF on 01/09   HD held on 01/10 due to instability,  S/p HD on 01/17, 1/20  S/p HD on 01/22,  S/p HD on 01/24  HD tomm   Keep MAP>65  Renal Diet  Consent in physical chart    Hyper/Hypokalemia  Monitor K, will change dialysate fluid as needed    HTN  currently on pressure support  monitor bp     CKD MBD  Can send a PTH  Ca and Phos daily    Anemia  CRISTIANE with HD  Transfuse if hgb <7    CAD with multivessel disease  s/p CABG 12/30  CTICU following    Hypoxic Resp failure requiring Trach  Per surgery  S/p brinchoscopy, transplant pulm following

## 2025-01-26 NOTE — PROGRESS NOTE ADULT - SUBJECTIVE AND OBJECTIVE BOX
MR#[id number]  PATIENT NAME:RAAD CANO    DATE OF SERVICE: 01-26-25 @ 0700  Patient was seen and examined by Solo Quick MD on    01-26-25 @ 0700  Interim events noted.Consultant notes ,Labs,Telemetry reviewed by me       HOSPITAL COURSE: HPI:  54M with PMH of HTN, HLD, ESRD on HD MWF via LUE AVF, ascites (requiring repeat paracenteses q4-6wks), NICM with moderate LV dysfunction w/ EF 35-40%(2023) and CAD s/p atherectomy + SALLIE to D1(4/11/2024) presents to [facility] transferred from [facility]. Patient [facility] ED with shortness of breath. Of note he was recently admitted from 09/27-12/02 for acute cholecystitis s/p cholecystectomy. In [facility] hospital ED, pt was hypoxic to 86% on RA, trop 1.7K, EKG no new changes, CXR fluid overload. Admitted for AHRF 2/2 fluid overload. Pt received HD on 12/18, 12/19, 12/20 and 12/22. DAPT was resumed, TTE showed improvement in LVEF to 40-45%. Stress test was abnormal with 1mm inferior STD, reversible ischemia in the inferior wall and fixed defects in the lateral, anterior and apical walls with post stress EF of 24%. Pt was then transferred to [facility] for cardiac cath which showed pLAD 70% ISR, mLCx 70%, D1 severe dz. Patient now transferred to [facility] CTS Dr. Rivers for CABG eval. Patient denies any current SOB or chest pain on admission. Otherwise denies headaches, abdominal pain, urinary or bowel changes, fevers, chills, N/V/D, sick contacts.  (24 Dec 2024 05:07)      INTERIM EVENTS:Patient seen at bedside ,interim events noted.      PMH -reviewed admission note, no change since admission  HEART FAILURE: Acute[ ]Chronic[ ] Systolic[ ] Diastolic[ ] Combined Systolic and Diastolic[ ]  CAD[ ] CABG[ ] PCI[ ]  DEVICES[ ] PPM[ ] ICD[ ] ILR[ ]  ATRIAL FIBRILLATION[ ] Paroxysmal[ ] Permanent[ ] CHADS2-[  ]  GHASSAN[ ] CKD1[ ] CKD2[ ] CKD3[ ] CKD4[ ] ESRD[ ]  COPD[ ] HTN[ ]   DM[ ] Type1[ ] Type 2[ ]   CVA[ ] Paresis[ ]    AMBULATION: Assisted[ ] Cane/walker[ ] Independent[ ]    MEDICATIONS  (STANDING):  acetylcysteine 10%  Inhalation 4 milliLiter(s) Inhalation every 6 hours  albuterol    0.083% 2.5 milliGRAM(s) Nebulizer every 6 hours  aMIOdarone    Tablet   Oral   aMIOdarone    Tablet 200 milliGRAM(s) Oral daily  ascorbic acid 500 milliGRAM(s) Oral daily  aspirin  chewable 81 milliGRAM(s) Oral daily  atorvastatin 80 milliGRAM(s) Oral at bedtime  bisacodyl Suppository 10 milliGRAM(s) Rectal once  chlorhexidine 0.12% Liquid 15 milliLiter(s) Oral Mucosa every 12 hours  chlorhexidine 2% Cloths 1 Application(s) Topical daily  chlorhexidine 4% Liquid 1 Application(s) Topical <User Schedule>  dextrose 50% Injectable 50 milliLiter(s) IV Push every 15 minutes  epoetin ignacia (EPOGEN) Injectable 09694 Unit(s) IV Push <User Schedule>  gabapentin 100 milliGRAM(s) Oral every 12 hours  heparin   Injectable 5000 Unit(s) SubCutaneous every 8 hours  insulin lispro (ADMELOG) corrective regimen sliding scale   SubCutaneous every 6 hours  insulin regular Infusion 3 Unit(s)/Hr (3 mL/Hr) IV Continuous <Continuous>  lidocaine   4% Patch 1 Patch Transdermal daily  linezolid  IVPB      linezolid  IVPB 600 milliGRAM(s) IV Intermittent every 12 hours  melatonin 5 milliGRAM(s) Oral at bedtime  meropenem  IVPB 500 milliGRAM(s) IV Intermittent every 24 hours  meropenem  IVPB      methocarbamol 500 milliGRAM(s) Oral every 8 hours  mirtazapine 7.5 milliGRAM(s) Oral daily  Nephro-elicia 1 Tablet(s) Oral daily  pantoprazole  Injectable 40 milliGRAM(s) IV Push daily  polyethylene glycol 3350 17 Gram(s) Oral two times a day  sodium chloride 0.65% Nasal 1 Spray(s) Both Nostrils every 12 hours  sodium chloride 0.9%. 1000 milliLiter(s) (10 mL/Hr) IV Continuous <Continuous>  traZODone 50 milliGRAM(s) Oral at bedtime    MEDICATIONS  (PRN):  acetaminophen     Tablet .. 650 milliGRAM(s) Oral every 6 hours PRN Mild Pain (1 - 3)  benzocaine 20% Spray 1 Spray(s) Topical every 6 hours PRN throat pain  lidocaine/prilocaine Cream 1 Application(s) Topical daily PRN pre-HD  sodium chloride 0.9% lock flush 10 milliLiter(s) IV Push every 1 hour PRN Pre/post blood products, medications, blood draw, and to maintain line patency            REVIEW OF SYSTEMS:  Constitutional: [ ] fever, [ ]weight loss,  [ ]fatigue [ ]weight gain  Eyes: [ ] visual changes  Respiratory: [ ]shortness of breath;  [ ] cough, [ ]wheezing, [ ]chills, [ ]hemoptysis  Cardiovascular: [ ] chest pain, [ ]palpitations, [ ]dizziness,  [ ]leg swelling[ ]orthopnea[ ]PND  Gastrointestinal: [ ] abdominal pain, [ ]nausea, [ ]vomiting,  [ ]diarrhea [ ]Constipation [ ]Melena  Genitourinary: [ ] dysuria, [ ] hematuria [ ]Vigil  Neurologic: [ ] headaches [ ] tremors[ ]weakness [ ]Paralysis Right[ ] Left[ ]  Skin: [ ] itching, [ ]burning, [ ] rashes  Endocrine: [ ] heat or cold intolerance  Musculoskeletal: [ ] joint pain or swelling; [ ] muscle, back, or extremity pain  Psychiatric: [ ] depression, [ ]anxiety, [ ]mood swings, or [ ]difficulty sleeping  Hematologic: [ ] easy bruising, [ ] bleeding gums    [ ] All remaining systems negative except as per above.   [ ]Unable to obtain.  [x] No change in ROS since admission      Vital Signs Last 24 Hrs  T(C): 36.8 (26 Jan 2025 20:00), Max: 36.8 (26 Jan 2025 20:00)  T(F): 98.2 (26 Jan 2025 20:00), Max: 98.2 (26 Jan 2025 20:00)  HR: 66 (26 Jan 2025 22:00) (65 - 83)  BP: 116/61 (26 Jan 2025 22:00) (116/61 - 212/94)  BP(mean): 84 (26 Jan 2025 22:00) (84 - 149)  RR: 22 (26 Jan 2025 22:00) (10 - 32)  SpO2: 100% (26 Jan 2025 22:00) (93% - 100%)    Parameters below as of 26 Jan 2025 20:00  Patient On (Oxygen Delivery Method): ventilator    O2 Concentration (%): 40  I&O's Summary    25 Jan 2025 07:01  -  26 Jan 2025 07:00  --------------------------------------------------------  IN: 2430 mL / OUT: 350 mL / NET: 2080 mL    26 Jan 2025 07:01  -  26 Jan 2025 22:31  --------------------------------------------------------  IN: 1110 mL / OUT: 10 mL / NET: 1100 mL        PHYSICAL EXAM:  General: No acute distress BMI-  HEENT: EOMI, PERRL  Neck: Supple, [ ] JVD  Lungs: Equal air entry bilaterally; [ ] rales [ ] wheezing [ ] rhonchi  Heart: Regular rate and rhythm; [x ] murmur   2/6 [ x] systolic [ ] diastolic [ ] radiation[ ] rubs [ ]  gallops  Abdomen: Nontender, bowel sounds present  Extremities: No clubbing, cyanosis, [ ] edema [ ]Pulses  equal and intact  Nervous system:  Alert & Oriented X3, no focal deficits  Psychiatric: Normal affect  Skin: No rashes or lesions    LABS:  01-26    133[L]  |  94[L]  |  38[H]  ----------------------------<  90  4.1   |  26  |  5.22[H]    Ca    8.4      26 Jan 2025 00:28  Phos  4.7     01-26  Mg     2.3     01-26    TPro  6.1  /  Alb  2.8[L]  /  TBili  0.4  /  DBili  x   /  AST  21  /  ALT  13  /  AlkPhos  129[H]  01-26    Creatinine Trend: 5.22<--, 4.32<--, 5.64<--, 4.69<--, 5.89<--, 4.86<--                        10.0   11.39 )-----------( 365      ( 26 Jan 2025 00:27 )             32.3           < from: TTE W or WO Ultrasound Enhancing Agent (01.19.25 @ 09:06) >    TRANSTHORACIC ECHOCARDIOGRAM REPORT  ________________________________________________________________________________                                      _______       Pt. Name:       ALAINA CANO Study Date:    1/19/2025  MRN:            [id number]       YOB: 1970  Accession #:    [id number]        Age:           54 years  Account#:       [id number]     Gender:        M  Heart Rate:                      Height:        71.00 in (180.34 cm)  Rhythm:Weight:        187.00 lb (84.82 kg)  Blood Pressure: 120/50 mmHg      BSA/BMI:       2.05 m² / 26.08 kg/m²  ________________________________________________________________________________________  Referring Physician:    [id number] Karan Rivers  Interpreting Physician: Bozena Suh MD  Primary Sonographer:    Layla MCLEAN    CPT:               ECHO TTE W/O CON F/U LTD - 45471.m  Indication(s):     Malignant pericardial effusion in diseases classified                     elsewhere - I31.31  Procedure:         Limited transthoracic echocardiogram.  Ordering Location: [facility]  Admission Status:  Inpatient  Study Information: Image quality for this study is technically difficult.    _______________________________________________________________________________________     CONCLUSIONS:      1. Technically difficult image quality.   2. No pericardial effusion seen.    ________________________________________________________________________________________  FINDINGS:     Pericardium:  No pericardial effusion seen.  ________________________________________________________________________________________  Electronically signed on 1/19/2025 at 4:42:24 PM by Bozena Suh MD      *** Final ***    < end of copied text >

## 2025-01-26 NOTE — PROGRESS NOTE ADULT - ASSESSMENT
54M with PMH of HTN, HLD, ESRD on HD MWF via LUE AVF, ascites (requiring repeat paracenteses q4-6wks), NICM with moderate LV dysfunction w/ EF 35-40%() and CAD s/p atherectomy + SALLIE to D1(2024) presents to [facility] transferred from [facility]. Patient initially presented to [facility] ED with shortness of breath. Of note he was recently admitted from - for acute cholecystitis s/p cholecystectomy. In [facility] ED, pt was hypoxic to 86% on RA, trop 1.7K, EKG no new changes, CXR fluid overload. Admitted for AHRF 2/2 fluid overload. Pt received HD on , ,  and . DAPT was resumed, TTE showed improvement in LVEF to 40-45%. Stress test was abnormal with 1mm inferior STD, reversible ischemia in the inferior wall and fixed defects in the lateral, anterior and apical walls with post stress EF of 24%.     Pt was then transferred to [facility] for cardiac cath which showed pLAD 70% ISR, mLCx 70%, D1 severe dz.     Patient now transferred to [facility] CTS Dr. Rivers for CABG eval.# CAD s/p NSTEMI  Hx CAD s/p PCI LAD   Presented with ADHF elevated troponins  Positive NST1-Cath- pLAD 70% ISR, mLCx 70%, D1 severe dz.   TTE EF 35% Severe TRWas on Plavix-p2y12- 321 been off Plavix since -POD#1-C3L free LIMA-LAD; SVG-Diag; DCZ-naoeQA-Fjcetitgv on  Sinus rhythm,Amio for AF prophylaxis  -OOB off  Sinus rhythm   -POD#3-On /pressors-EF 25-30% RV failure with transaminitis-Sinus Zzdrun24/03-POD#4-Was intubated for hypoxia-gradual hypotension on /pressors had IABP inserted this morning  -POD#5-s/p IABP insertion, sepsis/septic shock due to GNR/ECOLI HD cath placed   Bronched yesterday 1/3 - minimal secretions with rust-tinged sputum   ESBL-E Coli bacteremia on meropenam    - POD#7 Atrial Fib ,remains intubated weaning IABP 1:3,off pressors on CRRT  -IABP removed.Remains on vent-Alex 10ppm,off Levo- CVP 10 / MAP 71 / Hct 25.6% / Lactate 1.7-Atrial Fibrillation  -Intubated on Alex 10ppm, gtt, BP better-AF~~90's on Amio-had bronch still febrile Tmax 43885/09-Extubated awake alert on HFNC AF,on Dobutrex-CRRT,Cultures no growth    01/10-OOB on BiPAP Sinus Rhythm on -SR/ MAP 57/ CVP 10/  Hct 26.7 / Lact 1.4  -s/p EDU/DCCV-Sinus rhythm on -SR / MAP 52 / CVP 12/ Hct 25.8 / Lact 0.7-had bronch with BAL Left lung  -Sinus rhythm on -for repeat Bronch-SR / MAP 67 / CVP 11 / Hct 27.4% / Lact 0.8  -s/p Trach on  TTE EF 55%-SR / CVP 2 / MAP 63 / Hct 25.9% / Lactate 0.9  -Awake on Trach collar off  c/o buttock pain had bronch yesterday-BAL-Sinus rhythm  -Sinus rhythm on vent at night-BP stable may need to increase hydrallazine 25 mg q8      # Acute on Chronic Systolic Heart Failure now improved  EF-35% ,severe TR  Had HD post op  GDMT post op  LVEF improved post bypass but now at 23-30%-BiV dysfunction on  now off pressors  -TTE EF 40%,trace effusion  ECG c/w pericarditis-was on colchicine   01/15-TTE EF 55%    Vascular evaluated  AVGraft /?steal causing RV failure-'' Very low suspicion of steal Sd and AVF causing current clinical state. ''   VA Duplex Hemodialysis Access, Left (25 @ 13:35) >   Arterial flow volume of 1301 mL/m, and the venous flow volume of  1492 mL/m are consistent with a normally functioning dialysis access  fistula.      # Ascites  Hx chronic ascites  Has drainage q4-6 weeks  Last drained -4600mL  ? ascites related to severe TR  Had vwmdxzlktmph-Urdhwvk-zc growth   CT Abdomen 01/10-Large volume ascites-consider paracentesis  Monitor      # ESRD  Now off CRRT transition to HD     # Sacral Decubitus  Seen by Plastics and Gen Surgery

## 2025-01-27 ENCOUNTER — RESULT REVIEW (OUTPATIENT)
Age: 55
End: 2025-01-27

## 2025-01-27 LAB
-  AMPICILLIN: SIGNIFICANT CHANGE UP
-  ERYTHROMYCIN: SIGNIFICANT CHANGE UP
-  LEVOFLOXACIN: SIGNIFICANT CHANGE UP
-  LINEZOLID: SIGNIFICANT CHANGE UP
-  PENICILLIN: >2 — SIGNIFICANT CHANGE UP
-  RIFAMPIN: SIGNIFICANT CHANGE UP
-  VANCOMYCIN: SIGNIFICANT CHANGE UP
ALBUMIN SERPL ELPH-MCNC: 2.7 G/DL — LOW (ref 3.3–5)
ALP SERPL-CCNC: 125 U/L — HIGH (ref 40–120)
ALT FLD-CCNC: 9 U/L — LOW (ref 10–45)
ANION GAP SERPL CALC-SCNC: 17 MMOL/L — SIGNIFICANT CHANGE UP (ref 5–17)
AST SERPL-CCNC: 17 U/L — SIGNIFICANT CHANGE UP (ref 10–40)
BASOPHILS # BLD AUTO: 0.08 K/UL — SIGNIFICANT CHANGE UP (ref 0–0.2)
BASOPHILS NFR BLD AUTO: 0.8 % — SIGNIFICANT CHANGE UP (ref 0–2)
BILIRUB SERPL-MCNC: 0.5 MG/DL — SIGNIFICANT CHANGE UP (ref 0.2–1.2)
BUN SERPL-MCNC: 42 MG/DL — HIGH (ref 7–23)
CALCIUM SERPL-MCNC: 8.7 MG/DL — SIGNIFICANT CHANGE UP (ref 8.4–10.5)
CHLORIDE SERPL-SCNC: 91 MMOL/L — LOW (ref 96–108)
CO2 SERPL-SCNC: 23 MMOL/L — SIGNIFICANT CHANGE UP (ref 22–31)
CREAT SERPL-MCNC: 6.22 MG/DL — HIGH (ref 0.5–1.3)
CULTURE RESULTS: ABNORMAL
EGFR: 10 ML/MIN/1.73M2 — LOW
EGFR: 10 ML/MIN/1.73M2 — LOW
EOSINOPHIL # BLD AUTO: 0.75 K/UL — HIGH (ref 0–0.5)
EOSINOPHIL NFR BLD AUTO: 7.3 % — HIGH (ref 0–6)
GLUCOSE SERPL-MCNC: 87 MG/DL — SIGNIFICANT CHANGE UP (ref 70–99)
HCT VFR BLD CALC: 30.7 % — LOW (ref 39–50)
HGB BLD-MCNC: 9.6 G/DL — LOW (ref 13–17)
IMM GRANULOCYTES NFR BLD AUTO: 0.6 % — SIGNIFICANT CHANGE UP (ref 0–0.9)
LEGIONELLA DNA SPEC NAA+PROBE: SIGNIFICANT CHANGE UP
LYMPHOCYTES # BLD AUTO: 0.64 K/UL — LOW (ref 1–3.3)
LYMPHOCYTES # BLD AUTO: 6.2 % — LOW (ref 13–44)
MAGNESIUM SERPL-MCNC: 2.2 MG/DL — SIGNIFICANT CHANGE UP (ref 1.6–2.6)
MCHC RBC-ENTMCNC: 28.9 PG — SIGNIFICANT CHANGE UP (ref 27–34)
MCHC RBC-ENTMCNC: 31.3 G/DL — LOW (ref 32–36)
METHOD TYPE: SIGNIFICANT CHANGE UP
MONOCYTES # BLD AUTO: 0.81 K/UL — SIGNIFICANT CHANGE UP (ref 0–0.9)
MONOCYTES NFR BLD AUTO: 7.9 % — SIGNIFICANT CHANGE UP (ref 2–14)
NEUTROPHILS # BLD AUTO: 7.94 K/UL — HIGH (ref 1.8–7.4)
NOCARDIA RT-PCR-BAL: ABNORMAL
NRBC BLD-RTO: 0 /100 WBCS — SIGNIFICANT CHANGE UP (ref 0–0)
ORGANISM # SPEC MICROSCOPIC CNT: ABNORMAL
ORGANISM # SPEC MICROSCOPIC CNT: ABNORMAL
PHOSPHATE SERPL-MCNC: 4.7 MG/DL — HIGH (ref 2.5–4.5)
PLATELET # BLD AUTO: 356 K/UL — SIGNIFICANT CHANGE UP (ref 150–400)
POTASSIUM SERPL-MCNC: 4.2 MMOL/L — SIGNIFICANT CHANGE UP (ref 3.5–5.3)
POTASSIUM SERPL-SCNC: 4.2 MMOL/L — SIGNIFICANT CHANGE UP (ref 3.5–5.3)
PROT SERPL-MCNC: 5.9 G/DL — LOW (ref 6–8.3)
RBC # BLD: 3.32 M/UL — LOW (ref 4.2–5.8)
RBC # FLD: 18.9 % — HIGH (ref 10.3–14.5)
SODIUM SERPL-SCNC: 131 MMOL/L — LOW (ref 135–145)
SPECIMEN SOURCE: SIGNIFICANT CHANGE UP
SPECIMEN SOURCE: SIGNIFICANT CHANGE UP
WBC # BLD: 10.28 K/UL — SIGNIFICANT CHANGE UP (ref 3.8–10.5)
WBC # FLD AUTO: 10.28 K/UL — SIGNIFICANT CHANGE UP (ref 3.8–10.5)

## 2025-01-27 PROCEDURE — 99232 SBSQ HOSP IP/OBS MODERATE 35: CPT

## 2025-01-27 PROCEDURE — 71045 X-RAY EXAM CHEST 1 VIEW: CPT | Mod: 26

## 2025-01-27 PROCEDURE — 99291 CRITICAL CARE FIRST HOUR: CPT

## 2025-01-27 PROCEDURE — 93308 TTE F-UP OR LMTD: CPT | Mod: 26

## 2025-01-27 PROCEDURE — G0545: CPT

## 2025-01-27 RX ORDER — HYDROMORPHONE/SOD CHLOR,ISO/PF 2 MG/10 ML
0.5 SYRINGE (ML) INJECTION ONCE
Refills: 0 | Status: DISCONTINUED | OUTPATIENT
Start: 2025-01-27 | End: 2025-01-27

## 2025-01-27 RX ORDER — DIPHENHYDRAMINE HCL 12.5MG/5ML
25 ELIXIR ORAL ONCE
Refills: 0 | Status: COMPLETED | OUTPATIENT
Start: 2025-01-27 | End: 2025-01-27

## 2025-01-27 RX ORDER — OXYCODONE HYDROCHLORIDE 30 MG/1
5 TABLET ORAL EVERY 6 HOURS
Refills: 0 | Status: DISCONTINUED | OUTPATIENT
Start: 2025-01-27 | End: 2025-01-31

## 2025-01-27 RX ORDER — OXYCODONE HYDROCHLORIDE 30 MG/1
10 TABLET ORAL EVERY 6 HOURS
Refills: 0 | Status: DISCONTINUED | OUTPATIENT
Start: 2025-01-27 | End: 2025-01-31

## 2025-01-27 RX ADMIN — ACETYLCYSTEINE 4 MILLILITER(S): 200 INHALANT RESPIRATORY (INHALATION) at 05:39

## 2025-01-27 RX ADMIN — Medication 650 MILLIGRAM(S): at 20:00

## 2025-01-27 RX ADMIN — ATORVASTATIN CALCIUM 80 MILLIGRAM(S): 80 TABLET, FILM COATED ORAL at 21:16

## 2025-01-27 RX ADMIN — Medication 2.5 MILLIGRAM(S): at 17:36

## 2025-01-27 RX ADMIN — LIDOCAINE AND PRILOCAINE 1 APPLICATION(S): 25; 25 CREAM TOPICAL at 07:47

## 2025-01-27 RX ADMIN — Medication 0.5 MILLIGRAM(S): at 02:45

## 2025-01-27 RX ADMIN — Medication 500 MILLIGRAM(S): at 11:19

## 2025-01-27 RX ADMIN — MIRTAZAPINE 7.5 MILLIGRAM(S): 30 TABLET, FILM COATED ORAL at 11:19

## 2025-01-27 RX ADMIN — EPOETIN ALFA 10000 UNIT(S): 10000 SOLUTION INTRAVENOUS; SUBCUTANEOUS at 09:54

## 2025-01-27 RX ADMIN — LIDOCAINE HYDROCHLORIDE 1 PATCH: 20 JELLY TOPICAL at 00:14

## 2025-01-27 RX ADMIN — Medication 1 SPRAY(S): at 17:34

## 2025-01-27 RX ADMIN — LIDOCAINE HYDROCHLORIDE 1 PATCH: 20 JELLY TOPICAL at 11:19

## 2025-01-27 RX ADMIN — Medication 1 TABLET(S): at 11:19

## 2025-01-27 RX ADMIN — LIDOCAINE HYDROCHLORIDE 1 PATCH: 20 JELLY TOPICAL at 21:34

## 2025-01-27 RX ADMIN — ACETYLCYSTEINE 4 MILLILITER(S): 200 INHALANT RESPIRATORY (INHALATION) at 11:40

## 2025-01-27 RX ADMIN — HEPARIN SODIUM 5000 UNIT(S): 1000 INJECTION INTRAVENOUS; SUBCUTANEOUS at 06:06

## 2025-01-27 RX ADMIN — METHOCARBAMOL 500 MILLIGRAM(S): 500 TABLET, FILM COATED ORAL at 14:05

## 2025-01-27 RX ADMIN — Medication 1 APPLICATION(S): at 21:17

## 2025-01-27 RX ADMIN — Medication 25 MILLIGRAM(S): at 10:33

## 2025-01-27 RX ADMIN — Medication 5 MILLIGRAM(S): at 21:16

## 2025-01-27 RX ADMIN — OXYCODONE HYDROCHLORIDE 5 MILLIGRAM(S): 30 TABLET ORAL at 14:26

## 2025-01-27 RX ADMIN — Medication 0.5 MILLIGRAM(S): at 08:18

## 2025-01-27 RX ADMIN — METHOCARBAMOL 500 MILLIGRAM(S): 500 TABLET, FILM COATED ORAL at 06:06

## 2025-01-27 RX ADMIN — OXYCODONE HYDROCHLORIDE 5 MILLIGRAM(S): 30 TABLET ORAL at 13:56

## 2025-01-27 RX ADMIN — Medication 0.5 MILLIGRAM(S): at 03:00

## 2025-01-27 RX ADMIN — Medication 40 MILLIGRAM(S): at 11:18

## 2025-01-27 RX ADMIN — Medication 0.5 MILLIGRAM(S): at 08:33

## 2025-01-27 RX ADMIN — GABAPENTIN 100 MILLIGRAM(S): 400 CAPSULE ORAL at 06:07

## 2025-01-27 RX ADMIN — Medication 15 MILLILITER(S): at 17:33

## 2025-01-27 RX ADMIN — DOBUTAMINE 2.55 MICROGRAM(S)/KG/MIN: 250 INJECTION INTRAVENOUS at 00:06

## 2025-01-27 RX ADMIN — MEROPENEM 100 MILLIGRAM(S): 1 INJECTION INTRAVENOUS at 14:03

## 2025-01-27 RX ADMIN — HEPARIN SODIUM 5000 UNIT(S): 1000 INJECTION INTRAVENOUS; SUBCUTANEOUS at 21:16

## 2025-01-27 RX ADMIN — ACETYLCYSTEINE 4 MILLILITER(S): 200 INHALANT RESPIRATORY (INHALATION) at 17:36

## 2025-01-27 RX ADMIN — Medication 81 MILLIGRAM(S): at 11:18

## 2025-01-27 RX ADMIN — Medication 2.5 MILLIGRAM(S): at 11:39

## 2025-01-27 RX ADMIN — LINEZOLID 300 MILLIGRAM(S): 2 INJECTION, SOLUTION INTRAVENOUS at 00:20

## 2025-01-27 RX ADMIN — Medication 2.5 MILLIGRAM(S): at 05:39

## 2025-01-27 RX ADMIN — GABAPENTIN 100 MILLIGRAM(S): 400 CAPSULE ORAL at 17:33

## 2025-01-27 RX ADMIN — OXYCODONE HYDROCHLORIDE 10 MILLIGRAM(S): 30 TABLET ORAL at 20:00

## 2025-01-27 RX ADMIN — DOBUTAMINE 2.55 MICROGRAM(S)/KG/MIN: 250 INJECTION INTRAVENOUS at 07:32

## 2025-01-27 RX ADMIN — LIDOCAINE HYDROCHLORIDE 1 PATCH: 20 JELLY TOPICAL at 18:54

## 2025-01-27 RX ADMIN — Medication 50 MILLIGRAM(S): at 21:17

## 2025-01-27 RX ADMIN — LINEZOLID 300 MILLIGRAM(S): 2 INJECTION, SOLUTION INTRAVENOUS at 14:50

## 2025-01-27 RX ADMIN — Medication 1 APPLICATION(S): at 06:06

## 2025-01-27 RX ADMIN — HEPARIN SODIUM 5000 UNIT(S): 1000 INJECTION INTRAVENOUS; SUBCUTANEOUS at 14:26

## 2025-01-27 RX ADMIN — OXYCODONE HYDROCHLORIDE 10 MILLIGRAM(S): 30 TABLET ORAL at 21:00

## 2025-01-27 RX ADMIN — Medication 650 MILLIGRAM(S): at 21:00

## 2025-01-27 RX ADMIN — Medication 108.6 MILLIGRAM(S): at 21:22

## 2025-01-27 RX ADMIN — METHOCARBAMOL 500 MILLIGRAM(S): 500 TABLET, FILM COATED ORAL at 21:16

## 2025-01-27 NOTE — PROGRESS NOTE ADULT - ASSESSMENT
54M with HTN, HLD, ESRD on HD MWF via LUE AVF, ascites (requiring repeat paracenteses q4-6wks), NICM with moderate LV dysfunction w/ EF 35-40%() and CAD s/p atherectomy + SALLIE to D1 (2024) presents to [facility] 2024 as transfer from [facility] (via [facility]) where he presented  with SOB.   In [facility] ED, pt was hypoxic to 86% on RA, trop 1.7K, EKG no new changes, CXR fluid overload. Admitted for AHRF 2/2 fluid overload. Pt received HD on , ,  and . DAPT was resumed, TTE showed improvement in LVEF to 40-45%. Stress test was abnormal with 1mm inferior STD, reversible ischemia in the inferior wall and fixed defects in the lateral, anterior and apical walls with post stress EF of 24%. Pt was then transferred to [facility] for cardiac cath which showed pLAD 70% ISR, mLCx 70%, D1 severe dz. Patient now transferred to [facility] 2024 for CABG.       - underwent  C3L free LIMA-LAD; SVG-Diag; SVG-leftPL   - extubated   - hypotension and ECHO showing Systolic HF/depressed BIV Function, currently supported with /pressors.  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 fluid collection      Marla Champion M.D. ,   please reach via teams   If no answer, or after 5PM/ weekends,  then please call  [phone]    Assessment and plan discussed with the primary team .

## 2025-01-27 NOTE — CHART NOTE - NSCHARTNOTEFT_GEN_A_CORE
HEPATOLOGY:      Initially consulted in the setting of persistent ascites and for work up of possible hepatic etiology. Reviewed w/ Dr. Dbuon, and based on fluid studies, review of hepatic imaging and labs, it is unlikely that ascites is in the setting of underlying cirrhosis. Ascites is likely cardiac in etiology given RV dysfunction on recent TTE.     Recommendations:  - Continue diuretics as per cardiology for likely cardiac ascites  - If patient is being evaluated for heart transplant purposes, can obtain chronic liver disease work up as noted on previous hepatology note  - Definitive diagnosis for etiology of portal hypertension related ascites would require IR guided portal and hepatic pressure measurements.    Hepatology to sign off for now. Please call back if any questions/concerns.    Brad Garner  GI/Hepatology Fellow, PGY-4    MONDAY-FRIDAY 8AM-5PM:  Please message via [employer] or email [email or web address]     On Weekends/Holidays (All day) and Weekdays after 5 PM to 8 AM  For nonurgent consults please email:  Please email [email or web address]  For urgent consults:  Please contact on call GI team. See Amion schedule ([facility]), [employer] paging system ([facility]), or call hospital  ([facility])

## 2025-01-27 NOTE — PROGRESS NOTE ADULT - SUBJECTIVE AND OBJECTIVE BOX
Patient is a 54y old  Male who presents with a chief complaint of MI (24 Jan 2025 10:30)    Being followed by ID for        Interval history:  No other acute events      ROS:  No cough,SOB,CP  No N/V/D  No abd pain  No urinary complaints  No HA  No joint or limb pain  No other complaints    PAST MEDICAL & SURGICAL HISTORY:  Type 2 diabetes mellitus      Hypertension      End stage renal disease  started HD 2/2019 T, Th, Sat via right chest permacath      Bone spur  right shoulder- hx of - sx done      Anemia      Injury of right wrist  hx of at age 14      History of hyperkalemia  before HD- K-6.2 - to repeat in am of sx, pt had HD today      S/P arthroscopy of right shoulder  2010      History of vascular access device  right chest permacath - 2/2019      H/O right wrist surgery  tendons repair - at age 14      AV fistula      H/O ventral hernia repair      S/P cholecystectomy        Allergies    No Known Allergies    Intolerances      Antimicrobials:    acyclovir IVPB 430 milliGRAM(s) IV Intermittent <User Schedule>  linezolid  IVPB      linezolid  IVPB 600 milliGRAM(s) IV Intermittent every 12 hours  meropenem  IVPB 500 milliGRAM(s) IV Intermittent every 24 hours  meropenem  IVPB        MEDICATIONS  (STANDING):  acetylcysteine 10%  Inhalation 4 milliLiter(s) Inhalation every 6 hours  acyclovir IVPB 430 milliGRAM(s) IV Intermittent <User Schedule>  albuterol    0.083% 2.5 milliGRAM(s) Nebulizer every 6 hours  ascorbic acid 500 milliGRAM(s) Oral daily  aspirin  chewable 81 milliGRAM(s) Oral daily  atorvastatin 80 milliGRAM(s) Oral at bedtime  bisacodyl Suppository 10 milliGRAM(s) Rectal once  chlorhexidine 0.12% Liquid 15 milliLiter(s) Oral Mucosa every 12 hours  chlorhexidine 2% Cloths 1 Application(s) Topical daily  chlorhexidine 4% Liquid 1 Application(s) Topical <User Schedule>  dextrose 50% Injectable 50 milliLiter(s) IV Push every 15 minutes  DOBUTamine Infusion 1 MICROgram(s)/kG/Min (2.55 mL/Hr) IV Continuous <Continuous>  epoetin ignacia (EPOGEN) Injectable 99790 Unit(s) IV Push <User Schedule>  gabapentin 100 milliGRAM(s) Oral every 12 hours  heparin   Injectable 5000 Unit(s) SubCutaneous every 8 hours  insulin lispro (ADMELOG) corrective regimen sliding scale   SubCutaneous every 6 hours  lidocaine   4% Patch 1 Patch Transdermal daily  linezolid  IVPB      linezolid  IVPB 600 milliGRAM(s) IV Intermittent every 12 hours  melatonin 5 milliGRAM(s) Oral at bedtime  meropenem  IVPB 500 milliGRAM(s) IV Intermittent every 24 hours  meropenem  IVPB      methocarbamol 500 milliGRAM(s) Oral every 8 hours  mirtazapine 7.5 milliGRAM(s) Oral daily  Nephro-elicia 1 Tablet(s) Oral daily  pantoprazole  Injectable 40 milliGRAM(s) IV Push daily  polyethylene glycol 3350 17 Gram(s) Oral two times a day  sodium chloride 0.65% Nasal 1 Spray(s) Both Nostrils every 12 hours  sodium chloride 0.9%. 1000 milliLiter(s) (10 mL/Hr) IV Continuous <Continuous>  traZODone 50 milliGRAM(s) Oral at bedtime      Vital Signs Last 24 Hrs  T(C): 36.5 (01-27-25 @ 09:25), Max: 37.1 (01-27-25 @ 08:00)  T(F): 97.7 (01-27-25 @ 09:25), Max: 98.8 (01-27-25 @ 08:00)  HR: 73 (01-27-25 @ 10:00) (62 - 79)  BP: 126/66 (01-27-25 @ 10:00) (105/51 - 189/104)  BP(mean): 89 (01-27-25 @ 10:00) (74 - 140)  RR: 22 (01-27-25 @ 10:00) (10 - 36)  SpO2: 100% (01-27-25 @ 10:00) (93% - 100%)    Physical Exam:    Constitutional well preserved,comfortable,pleasant    HEENT PERRLA EOMI,No pallor or icterus    No oral exudate or erythema    Neck supple no JVD or LN    Chest Good AE,CTA    CVS RRR S1 S2 WNl No murmur or rub or gallop    Abd soft BS normal No tenderness no masses    Ext No cyanosis clubbing or edema    IV site no erythema tenderness or discharge    Joints no swelling or LOM    CNS AAO X 3 no focal    Lab Data:                          9.6    10.28 )-----------( 356      ( 27 Jan 2025 00:54 )             30.7       01-27    131[L]  |  91[L]  |  42[H]  ----------------------------<  87  4.2   |  23  |  6.22[H]    Ca    8.7      27 Jan 2025 00:54  Phos  4.7     01-27  Mg     2.2     01-27    TPro  5.9[L]  /  Alb  2.7[L]  /  TBili  0.5  /  DBili  x   /  AST  17  /  ALT  9[L]  /  AlkPhos  125[H]  01-27      Urinalysis Basic - ( 27 Jan 2025 00:54 )    Color: x / Appearance: x / SG: x / pH: x  Gluc: 87 mg/dL / Ketone: x  / Bili: x / Urobili: x   Blood: x / Protein: x / Nitrite: x   Leuk Esterase: x / RBC: x / WBC x   Sq Epi: x / Non Sq Epi: x / Bacteria: x        Tissue  01-26-25 --  --    Few polymorphonuclear leukocytes per low power field  No organisms seen per oil power field      Bronchial  01-24-25   Culture is being performed.  --    No polymorphonuclear leukocytes seen  No organisms seen  by cytocentrifuge      .Blood BLOOD  01-23-25   No growth at 72 Hours  --  --      .Blood BLOOD  01-22-25   Growth in anaerobic bottle: Enterococcus faecium (vancomycin resistant)  Direct identification is available within approximately 3-5  hours either by Blood Panel Multiplexed PCR or Direct  MALDI-TOF. Details: [email or web address]  --  Blood Culture PCR  Enterococcus faecium (vancomycin resistant)      Bronchial  01-20-25   Commensal manjit consistent with body site  --    Moderate polymorphonuclear leukocytes per low power field  Rare Squamous epithelial cells per low power field  No organisms seen per oil power field    Herpes Simplex Virus 1/2 Surveillance, PCR (01.24.25 @ 14:12)    Herpes Simplex Virus 1/2 Surveillance PCR Source: BAL   Herpes Simplex Virus 1/2 Surveillance PCR Result: HSV1 Detected HSV 1/2 and VZV assay is a Real-Time PCR test for the qualitative  detection and differentiation of herpes simplex virus type 1 (HSV1), 2  (HSV2) and varicella-zoster virus (VZV) DNA in samples. The result should  be interpreted with consideration of all clinical and laboratory findings.          WBC Count: 10.28 (01-27-25 @ 00:54)  WBC Count: 11.39 (01-26-25 @ 00:27)  WBC Count: 9.97 (01-25-25 @ 00:32)  WBC Count: 10.07 (01-24-25 @ 00:26)  WBC Count: 9.33 (01-23-25 @ 00:33)  WBC Count: 9.24 (01-22-25 @ 00:11)  WBC Count: 9.50 (01-21-25 @ 01:52)  WBC Count: 9.76 (01-21-25 @ 01:23)       Bilirubin Total: 0.5 mg/dL (01-27-25 @ 00:54)  Aspartate Aminotransferase (AST/SGOT): 17 U/L (01-27-25 @ 00:54)  Alanine Aminotransferase (ALT/SGPT): 9 U/L (01-27-25 @ 00:54)  Alkaline Phosphatase: 125 U/L (01-27-25 @ 00:54)  Bilirubin Total: 0.4 mg/dL (01-26-25 @ 00:28)  Aspartate Aminotransferase (AST/SGOT): 21 U/L (01-26-25 @ 00:28)  Alanine Aminotransferase (ALT/SGPT): 13 U/L (01-26-25 @ 00:28)  Alkaline Phosphatase: 129 U/L (01-26-25 @ 00:28)  Bilirubin Total: 0.6 mg/dL (01-25-25 @ 00:32)  Aspartate Aminotransferase (AST/SGOT): 64 U/L (01-25-25 @ 00:32)  Alanine Aminotransferase (ALT/SGPT): 15 U/L (01-25-25 @ 00:32)  Alkaline Phosphatase: 114 U/L (01-25-25 @ 00:32)  Alanine Aminotransferase (ALT/SGPT): 10 U/L (01-24-25 @ 00:26)  Alkaline Phosphatase: 120 U/L (01-24-25 @ 00:26)  Bilirubin Total: 0.5 mg/dL (01-24-25 @ 00:26)  Aspartate Aminotransferase (AST/SGOT): 19 U/L (01-24-25 @ 00:26)  Bilirubin Total: 0.6 mg/dL (01-23-25 @ 00:33)  Aspartate Aminotransferase (AST/SGOT): 22 U/L (01-23-25 @ 00:33)  Alanine Aminotransferase (ALT/SGPT): 9 U/L (01-23-25 @ 00:33)  Alkaline Phosphatase: 117 U/L (01-23-25 @ 00:33)         Patient is a 54y old  Male who presents with a chief complaint of MI (24 Jan 2025 10:30)    Being followed by ID for        Interval history:  pt c/o buttocks pain  pt c/o right forearm pain when touched   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  No other acute events          PAST MEDICAL & SURGICAL HISTORY:  Type 2 diabetes mellitus      Hypertension      End stage renal disease  started HD 2/2019 T, Th, Sat via right chest permacath      Bone spur  right shoulder- hx of - sx done      Anemia      Injury of right wrist  hx of at age 14      History of hyperkalemia  before HD- K-6.2 - to repeat in am of sx, pt had HD today      S/P arthroscopy of right shoulder  2010      History of vascular access device  right chest permacath - 2/2019      H/O right wrist surgery  tendons repair - at age 14      AV fistula      H/O ventral hernia repair      S/P cholecystectomy        Allergies    No Known Allergies    Intolerances      Antimicrobials:    acyclovir IVPB 430 milliGRAM(s) IV Intermittent <User Schedule>  linezolid  IVPB      linezolid  IVPB 600 milliGRAM(s) IV Intermittent every 12 hours  meropenem  IVPB 500 milliGRAM(s) IV Intermittent every 24 hours  meropenem  IVPB        MEDICATIONS  (STANDING):  acetylcysteine 10%  Inhalation 4 milliLiter(s) Inhalation every 6 hours  acyclovir IVPB 430 milliGRAM(s) IV Intermittent <User Schedule>  albuterol    0.083% 2.5 milliGRAM(s) Nebulizer every 6 hours  ascorbic acid 500 milliGRAM(s) Oral daily  aspirin  chewable 81 milliGRAM(s) Oral daily  atorvastatin 80 milliGRAM(s) Oral at bedtime  bisacodyl Suppository 10 milliGRAM(s) Rectal once  chlorhexidine 0.12% Liquid 15 milliLiter(s) Oral Mucosa every 12 hours  chlorhexidine 2% Cloths 1 Application(s) Topical daily  chlorhexidine 4% Liquid 1 Application(s) Topical <User Schedule>  dextrose 50% Injectable 50 milliLiter(s) IV Push every 15 minutes  DOBUTamine Infusion 1 MICROgram(s)/kG/Min (2.55 mL/Hr) IV Continuous <Continuous>  epoetin ignacia (EPOGEN) Injectable 02589 Unit(s) IV Push <User Schedule>  gabapentin 100 milliGRAM(s) Oral every 12 hours  heparin   Injectable 5000 Unit(s) SubCutaneous every 8 hours  insulin lispro (ADMELOG) corrective regimen sliding scale   SubCutaneous every 6 hours  lidocaine   4% Patch 1 Patch Transdermal daily  linezolid  IVPB      linezolid  IVPB 600 milliGRAM(s) IV Intermittent every 12 hours  melatonin 5 milliGRAM(s) Oral at bedtime  meropenem  IVPB 500 milliGRAM(s) IV Intermittent every 24 hours  meropenem  IVPB      methocarbamol 500 milliGRAM(s) Oral every 8 hours  mirtazapine 7.5 milliGRAM(s) Oral daily  Nephro-elicia 1 Tablet(s) Oral daily  pantoprazole  Injectable 40 milliGRAM(s) IV Push daily  polyethylene glycol 3350 17 Gram(s) Oral two times a day  sodium chloride 0.65% Nasal 1 Spray(s) Both Nostrils every 12 hours  sodium chloride 0.9%. 1000 milliLiter(s) (10 mL/Hr) IV Continuous <Continuous>  traZODone 50 milliGRAM(s) Oral at bedtime      Vital Signs Last 24 Hrs  T(C): 36.5 (01-27-25 @ 09:25), Max: 37.1 (01-27-25 @ 08:00)  T(F): 97.7 (01-27-25 @ 09:25), Max: 98.8 (01-27-25 @ 08:00)  HR: 73 (01-27-25 @ 10:00) (62 - 79)  BP: 126/66 (01-27-25 @ 10:00) (105/51 - 189/104)  BP(mean): 89 (01-27-25 @ 10:00) (74 - 140)  RR: 22 (01-27-25 @ 10:00) (10 - 36)  SpO2: 100% (01-27-25 @ 10:00) (93% - 100%)    Physical Exam:    Constitutional awake    HEENT PERRLA EOMI,No pallor or icterus    No oral exudate or erythema    Neck  trach    Chest G bilateral rhonchi    CVS  S1 S2    Abd soft BS normal No tenderness     Ext  right elbow eschar, tenderness on arm  no erythema or increased warmth    IV site no erythema tenderness or discharge    Joints no swelling or LOM    CNS AAO X 3 no focal    Lab Data:                          9.6    10.28 )-----------( 356      ( 27 Jan 2025 00:54 )             30.7       01-27    131[L]  |  91[L]  |  42[H]  ----------------------------<  87  4.2   |  23  |  6.22[H]    Ca    8.7      27 Jan 2025 00:54  Phos  4.7     01-27  Mg     2.2     01-27    TPro  5.9[L]  /  Alb  2.7[L]  /  TBili  0.5  /  DBili  x   /  AST  17  /  ALT  9[L]  /  AlkPhos  125[H]  01-27          Tissue  01-26-25 --  --    Few polymorphonuclear leukocytes per low power field  No organisms seen per oil power field      Bronchial  01-24-25   Culture is being performed.  --    No polymorphonuclear leukocytes seen  No organisms seen  by cytocentrifuge      .Blood BLOOD  01-23-25   No growth at 72 Hours  --  --      .Blood BLOOD  01-22-25   Growth in anaerobic bottle: Enterococcus faecium (vancomycin resistant)  Direct identification is available within approximately 3-5  hours either by Blood Panel Multiplexed PCR or Direct  MALDI-TOF. Details: [email or web address]  --  Blood Culture PCR  Enterococcus faecium (vancomycin resistant)      Bronchial  01-20-25   Commensal manjit consistent with body site  --    Moderate polymorphonuclear leukocytes per low power field  Rare Squamous epithelial cells per low power field  No organisms seen per oil power field    Herpes Simplex Virus 1/2 Surveillance, PCR (01.24.25 @ 14:12)    Herpes Simplex Virus 1/2 Surveillance PCR Source: BAL   Herpes Simplex Virus 1/2 Surveillance PCR Result: HSV1 Detected HSV 1/2 and VZV assay is a Real-Time PCR test for the qualitative  detection and differentiation of herpes simplex virus type 1 (HSV1), 2  (HSV2) and varicella-zoster virus (VZV) DNA in samples. The result should  be interpreted with consideration of all clinical and laboratory findings.          WBC Count: 10.28 (01-27-25 @ 00:54)  WBC Count: 11.39 (01-26-25 @ 00:27)  WBC Count: 9.97 (01-25-25 @ 00:32)  WBC Count: 10.07 (01-24-25 @ 00:26)  WBC Count: 9.33 (01-23-25 @ 00:33)  WBC Count: 9.24 (01-22-25 @ 00:11)  WBC Count: 9.50 (01-21-25 @ 01:52)  WBC Count: 9.76 (01-21-25 @ 01:23)       Bilirubin Total: 0.5 mg/dL (01-27-25 @ 00:54)  Aspartate Aminotransferase (AST/SGOT): 17 U/L (01-27-25 @ 00:54)  Alanine Aminotransferase (ALT/SGPT): 9 U/L (01-27-25 @ 00:54)  Alkaline Phosphatase: 125 U/L (01-27-25 @ 00:54)        < from: CT Chest No Cont (01.26.25 @ 14:48) >  IMPRESSION:  Decreased consolidation of the left upper and lower lobes.  Decreased partially loculated small left pleural effusion.  Increased small right pleural effusion.  Stable small to moderate pericardial effusion.        < end of copied text >      < from: CT Abdomen and Pelvis w/ IV Cont (01.23.25 @ 12:34) >  CHEST:  *  Near complete consolidation of the left lower lobe with volume loss   and partial consolidation of the left upper lobe volume loss, suspect a   combination of atelectasis and airspace disease. Groundglass opacities   throughout the right lung and within nonconsolidated portions of the left   lung of uncertain etiology, possibly representing pulmonary edema or an   infectious/inflammatory process.  *  Small bilateral pleural effusions, increased since 1/10/2025 with new   loculation on the left.  *  Increased small to moderate pericardial effusion.  *  Redemonstrated nonocclusive thrombus in the right internal jugular   vein, which partially encompasses the right IJ catheter.    ABDOMEN AND PELVIS:  *  Moderate to large ascites, mildly decreased since 1/10/2025.   Ill-defined infiltration within the mesentery and omentum with questioned   pelvic peritoneal nodule. Suggest correlation with fluid sampling.  *  Nonspecific subcutaneous infiltration overlying the lower sacrum and   coccyx on a background of anasarca. Ill-defined fluid located posterior   and inferior to the coccyx measuring approximately 2.4 x 1.8 cm, which   may represent confluent edema versus phlegmon or developing collection.  *  Cystic structure in the gallbladder fossa measuring 2.0 cm, possibly   representing a gallbladder remnant or residual cystic duct, with a small   collection not excluded.        --- End of Report ---      < end of copied text >

## 2025-01-27 NOTE — PROGRESS NOTE ADULT - SUBJECTIVE AND OBJECTIVE BOX
MR#[id number]  PATIENT NAME:RAAD CANO    DATE OF SERVICE: 01-27-25 @ 0930  Patient was seen and examined by Solo Quick MD on    01-27-25 @ 0930  Interim events noted.Consultant notes ,Labs,Telemetry reviewed by me       HOSPITAL COURSE: HPI:  54M with PMH of HTN, HLD, ESRD on HD MWF via LUE AVF, ascites (requiring repeat paracenteses q4-6wks), NICM with moderate LV dysfunction w/ EF 35-40%(2023) and CAD s/p atherectomy + SALLIE to D1(4/11/2024) presents to [facility] transferred from [facility]. Patient initially presented to [facility] ED with shortness of breath. Of note he was recently admitted from 09/27-12/02 for acute cholecystitis s/p cholecystectomy. In [facility] ED, pt was hypoxic to 86% on RA, trop 1.7K, EKG no new changes, CXR fluid overload. Admitted for AHRF 2/2 fluid overload. Pt received HD on 12/18, 12/19, 12/20 and 12/22. DAPT was resumed, TTE showed improvement in LVEF to 40-45%. Stress test was abnormal with 1mm inferior STD, reversible ischemia in the inferior wall and fixed defects in the lateral, anterior and apical walls with post stress EF of 24%. Pt was then transferred to [facility] for cardiac cath which showed pLAD 70% ISR, mLCx 70%, D1 severe dz. Patient now transferred to [facility] CTS Dr. Rivers for CABG eval. Patient denies any current SOB or chest pain on admission. Otherwise denies headaches, abdominal pain, urinary or bowel changes, fevers, chills, N/V/D, sick contacts.  (24 Dec 2024 05:07)      INTERIM EVENTS:Patient seen at bedside ,interim events noted.      PMH -reviewed admission note, no change since admission  HEART FAILURE: Acute[ ]Chronic[ ] Systolic[ ] Diastolic[ ] Combined Systolic and Diastolic[ ]  CAD[ ] CABG[ ] PCI[ ]  DEVICES[ ] PPM[ ] ICD[ ] ILR[ ]  ATRIAL FIBRILLATION[ ] Paroxysmal[ ] Permanent[ ] CHADS2-[  ]  GHASSAN[ ] CKD1[ ] CKD2[ ] CKD3[ ] CKD4[ ] ESRD[ ]  COPD[ ] HTN[ ]   DM[ ] Type1[ ] Type 2[ ]   CVA[ ] Paresis[ ]    AMBULATION: Assisted[ ] Cane/walker[ ] Independent[ ]    MEDICATIONS  (STANDING):  acetylcysteine 10%  Inhalation 4 milliLiter(s) Inhalation every 6 hours  acyclovir IVPB 430 milliGRAM(s) IV Intermittent <User Schedule>  albuterol    0.083% 2.5 milliGRAM(s) Nebulizer every 6 hours  ascorbic acid 500 milliGRAM(s) Oral daily  aspirin  chewable 81 milliGRAM(s) Oral daily  atorvastatin 80 milliGRAM(s) Oral at bedtime  bisacodyl Suppository 10 milliGRAM(s) Rectal once  chlorhexidine 0.12% Liquid 15 milliLiter(s) Oral Mucosa every 12 hours  chlorhexidine 2% Cloths 1 Application(s) Topical daily  chlorhexidine 4% Liquid 1 Application(s) Topical <User Schedule>  dextrose 50% Injectable 50 milliLiter(s) IV Push every 15 minutes  epoetin ignacia (EPOGEN) Injectable 55184 Unit(s) IV Push <User Schedule>  gabapentin 100 milliGRAM(s) Oral every 12 hours  heparin   Injectable 5000 Unit(s) SubCutaneous every 8 hours  insulin lispro (ADMELOG) corrective regimen sliding scale   SubCutaneous every 6 hours  lidocaine   4% Patch 1 Patch Transdermal daily  linezolid  IVPB      linezolid  IVPB 600 milliGRAM(s) IV Intermittent every 12 hours  melatonin 5 milliGRAM(s) Oral at bedtime  meropenem  IVPB 500 milliGRAM(s) IV Intermittent every 24 hours  meropenem  IVPB      methocarbamol 500 milliGRAM(s) Oral every 8 hours  mirtazapine 7.5 milliGRAM(s) Oral daily  Nephro-elicia 1 Tablet(s) Oral daily  pantoprazole  Injectable 40 milliGRAM(s) IV Push daily  polyethylene glycol 3350 17 Gram(s) Oral two times a day  sodium chloride 0.65% Nasal 1 Spray(s) Both Nostrils every 12 hours  sodium chloride 0.9%. 1000 milliLiter(s) (10 mL/Hr) IV Continuous <Continuous>  traZODone 50 milliGRAM(s) Oral at bedtime    MEDICATIONS  (PRN):  acetaminophen     Tablet .. 650 milliGRAM(s) Oral every 6 hours PRN Mild Pain (1 - 3)  benzocaine 20% Spray 1 Spray(s) Topical every 6 hours PRN throat pain  lidocaine/prilocaine Cream 1 Application(s) Topical daily PRN pre-HD  oxyCODONE    IR 5 milliGRAM(s) Oral every 6 hours PRN Moderate Pain (4 - 6)  oxyCODONE    IR 10 milliGRAM(s) Oral every 6 hours PRN Severe Pain (7 - 10)  sodium chloride 0.9% lock flush 10 milliLiter(s) IV Push every 1 hour PRN Pre/post blood products, medications, blood draw, and to maintain line patency            REVIEW OF SYSTEMS:  Constitutional: [ ] fever, [ ]weight loss,  [ ]fatigue [ ]weight gain  Eyes: [ ] visual changes  Respiratory: [ ]shortness of breath;  [ ] cough, [ ]wheezing, [ ]chills, [ ]hemoptysis  Cardiovascular: [ ] chest pain, [ ]palpitations, [ ]dizziness,  [ ]leg swelling[ ]orthopnea[ ]PND  Gastrointestinal: [ ] abdominal pain, [ ]nausea, [ ]vomiting,  [ ]diarrhea [ ]Constipation [ ]Melena  Genitourinary: [ ] dysuria, [ ] hematuria [ ]Vigil  Neurologic: [ ] headaches [ ] tremors[ ]weakness [ ]Paralysis Right[ ] Left[ ]  Skin: [ ] itching, [ ]burning, [ ] rashes  Endocrine: [ ] heat or cold intolerance  Musculoskeletal: [ ] joint pain or swelling; [ ] muscle, back, or extremity pain  Psychiatric: [ ] depression, [ ]anxiety, [ ]mood swings, or [ ]difficulty sleeping  Hematologic: [ ] easy bruising, [ ] bleeding gums    [ ] All remaining systems negative except as per above.   [ ]Unable to obtain.  [x] No change in ROS since admission      Vital Signs Last 24 Hrs  T(C): 36.9 (27 Jan 2025 20:00), Max: 37.1 (27 Jan 2025 08:00)  T(F): 98.4 (27 Jan 2025 20:00), Max: 98.8 (27 Jan 2025 08:00)  HR: 87 (27 Jan 2025 21:00) (62 - 87)  BP: 111/59 (27 Jan 2025 21:00) (105/51 - 173/80)  BP(mean): 79 (27 Jan 2025 21:00) (74 - 115)  RR: 24 (27 Jan 2025 21:00) (15 - 36)  SpO2: 97% (27 Jan 2025 21:00) (90% - 100%)    Parameters below as of 27 Jan 2025 20:00  Patient On (Oxygen Delivery Method): [facility]  O2 Flow (L/min): 40  O2 Concentration (%): 40  I&O's Summary    26 Jan 2025 07:01  -  27 Jan 2025 07:00  --------------------------------------------------------  IN: 2003.4 mL / OUT: 10 mL / NET: 1993.4 mL    27 Jan 2025 07:01  -  27 Jan 2025 21:47  --------------------------------------------------------  IN: 1153.2 mL / OUT: 1000 mL / NET: 153.2 mL        PHYSICAL EXAM:  General: No acute distress BMI-  HEENT: EOMI, PERRL  Neck: Supple, [ ] JVD  Lungs: Equal air entry bilaterally; [ ] rales [ ] wheezing [ ] rhonchi  Heart: Regular rate and rhythm; [x ] murmur   2/6 [ x] systolic [ ] diastolic [ ] radiation[ ] rubs [ ]  gallops  Abdomen: Nontender, bowel sounds present  Extremities: No clubbing, cyanosis, [ ] edema [ ]Pulses  equal and intact  Nervous system:  Alert & Oriented X3, no focal deficits  Psychiatric: Normal affect  Skin: No rashes or lesions    LABS:  01-27    131[L]  |  91[L]  |  42[H]  ----------------------------<  87  4.2   |  23  |  6.22[H]    Ca    8.7      27 Jan 2025 00:54  Phos  4.7     01-27  Mg     2.2     01-27    TPro  5.9[L]  /  Alb  2.7[L]  /  TBili  0.5  /  DBili  x   /  AST  17  /  ALT  9[L]  /  AlkPhos  125[H]  01-27    Creatinine Trend: 6.22<--, 5.22<--, 4.32<--, 5.64<--, 4.69<--, 5.89<--                        9.6    10.28 )-----------( 356      ( 27 Jan 2025 00:54 )             30.7         < from: TTE W or WO Ultrasound Enhancing Agent (01.27.25 @ 12:42) >     CONCLUSIONS:      1. This was a limited study to assess left ventricular systolic function.   2. Left ventricular systolic function is normal with an ejection fraction of 60 % by Michaels's method of disks.   3. Moderately enlarged right ventricular cavity size and mild to moderately reduced right ventricular systolic function.   4. Moderate pericardial effusion noted adjacent to the right atrium.   5. There is no definitive evidence of RA diastolic collapse in the available views. The pericardium proximal to the RV free wall was not well visualized. Due to limited views (as above, and no subcostal views), the study is indeterminate for the presence of echocardiographic signs of tamponade.   6. Left atrium is severely dilated.   7. Compared to the transthoracic echocardiogram performed on 1/19/2025, this was also a limited study. The area proximal to the right atrial free wall is better visualized on the present study and the echofree space most consistent with a pericardial effusion is more evident. There is again visual evidence of right ventricular dysfunction.    < end of copied text >

## 2025-01-27 NOTE — PROGRESS NOTE ADULT - SUBJECTIVE AND OBJECTIVE BOX
[facility]    Dr. Nica Styles     Office [phone] (9 am to 5 pm)  Service: [phone] (5pm to 9am)  Also Available on TEAMS      RENAL PROGRESS NOTE: DATE OF SERVICE 01-27-25 @ 11:37    Patient is a 54y old  Male who presents with a chief complaint of MI (24 Jan 2025 10:30)      Patient seen and examined at bedside. No chest pain/sob    VITALS:  T(F): 97.7 (01-27-25 @ 09:25), Max: 98.8 (01-27-25 @ 08:00)  HR: 73 (01-27-25 @ 10:00)  BP: 126/66 (01-27-25 @ 10:00)  RR: 22 (01-27-25 @ 10:00)  SpO2: 100% (01-27-25 @ 10:00)  Wt(kg): --    01-26 @ 07:01 - 01-27 @ 07:00  --------------------------------------------------------  IN: 2003.4 mL / OUT: 10 mL / NET: 1993.4 mL    01-27 @ 07:01 - 01-27 @ 11:37  --------------------------------------------------------  IN: 187.8 mL / OUT: 0 mL / NET: 187.8 mL          PHYSICAL EXAM:  Constitutional: NAD  Neck: No JVD  Respiratory: CTAB, no wheezes, rales or rhonchi  Cardiovascular: S1, S2, RRR  Gastrointestinal: BS+, soft, NT/ND  Extremities: No peripheral edema    Hospital Medications:   MEDICATIONS  (STANDING):  acetylcysteine 10%  Inhalation 4 milliLiter(s) Inhalation every 6 hours  acyclovir IVPB 430 milliGRAM(s) IV Intermittent <User Schedule>  albuterol    0.083% 2.5 milliGRAM(s) Nebulizer every 6 hours  ascorbic acid 500 milliGRAM(s) Oral daily  aspirin  chewable 81 milliGRAM(s) Oral daily  atorvastatin 80 milliGRAM(s) Oral at bedtime  bisacodyl Suppository 10 milliGRAM(s) Rectal once  chlorhexidine 0.12% Liquid 15 milliLiter(s) Oral Mucosa every 12 hours  chlorhexidine 2% Cloths 1 Application(s) Topical daily  chlorhexidine 4% Liquid 1 Application(s) Topical <User Schedule>  dextrose 50% Injectable 50 milliLiter(s) IV Push every 15 minutes  DOBUTamine Infusion 1 MICROgram(s)/kG/Min (2.55 mL/Hr) IV Continuous <Continuous>  epoetin ignacia (EPOGEN) Injectable 49046 Unit(s) IV Push <User Schedule>  gabapentin 100 milliGRAM(s) Oral every 12 hours  heparin   Injectable 5000 Unit(s) SubCutaneous every 8 hours  insulin lispro (ADMELOG) corrective regimen sliding scale   SubCutaneous every 6 hours  lidocaine   4% Patch 1 Patch Transdermal daily  linezolid  IVPB      linezolid  IVPB 600 milliGRAM(s) IV Intermittent every 12 hours  melatonin 5 milliGRAM(s) Oral at bedtime  meropenem  IVPB      meropenem  IVPB 500 milliGRAM(s) IV Intermittent every 24 hours  methocarbamol 500 milliGRAM(s) Oral every 8 hours  mirtazapine 7.5 milliGRAM(s) Oral daily  Nephro-elicia 1 Tablet(s) Oral daily  pantoprazole  Injectable 40 milliGRAM(s) IV Push daily  polyethylene glycol 3350 17 Gram(s) Oral two times a day  sodium chloride 0.65% Nasal 1 Spray(s) Both Nostrils every 12 hours  sodium chloride 0.9%. 1000 milliLiter(s) (10 mL/Hr) IV Continuous <Continuous>  traZODone 50 milliGRAM(s) Oral at bedtime      LABS:  01-27    131[L]  |  91[L]  |  42[H]  ----------------------------<  87  4.2   |  23  |  6.22[H]    Ca    8.7      27 Jan 2025 00:54  Phos  4.7     01-27  Mg     2.2     01-27    TPro  5.9[L]  /  Alb  2.7[L]  /  TBili  0.5  /  DBili      /  AST  17  /  ALT  9[L]  /  AlkPhos  125[H]  01-27    Creatinine Trend: 6.22 <--, 5.22 <--, 4.32 <--, 5.64 <--, 4.69 <--, 5.89 <--, 4.86 <--, 4.80 <--    Albumin: 2.7 g/dL (01-27 @ 00:54)  Phosphorus: 4.7 mg/dL (01-27 @ 00:54)                              9.6    10.28 )-----------( 356      ( 27 Jan 2025 00:54 )             30.7     Urine Studies:  Urinalysis - [01-27-25 @ 00:54]      Color  / Appearance  / SG  / pH       Gluc 87 / Ketone   / Bili  / Urobili        Blood  / Protein  / Leuk Est  / Nitrite       RBC  / WBC  / Hyaline  / Gran  / Sq Epi  / Non Sq Epi  / Bacteria       Iron 29, TIBC 143, %sat 20      [12-19-24 @ 05:00]  Ferritin 904      [12-19-24 @ 05:00]  TSH 4.75      [12-24-24 @ 06:50]        RADIOLOGY & ADDITIONAL STUDIES:

## 2025-01-27 NOTE — PROGRESS NOTE ADULT - SUBJECTIVE AND OBJECTIVE BOX
Patient seen and examined at the bedside.    Remains critically ill on continuous ICU monitoring.      Brief Summary:  53 yo M with multiple medical problems including CAD s/p PCI in April 2024 s/p CABG x 3 on 12/30/24 1/2: Intubated for hypoxia & left lung white out s/p awake bronch with removal of copious mucopurulent secretions  1/4: s/p IABP insertion, sepsis/septic shock due to E coli   ESBL pneumonia, collapsed Left Lung, s/p multiple bronch and tracheostomy tube placement 1/18    24 Hour events:  HD session  Continue antibiotics  Change TFs to vital per nutrition consult  Upper quadrant ultrasound to assess ascites      Objective:  Vital Signs Last 24 Hrs  T(C): 37.1 (27 Jan 2025 08:00), Max: 37.1 (27 Jan 2025 08:00)  T(F): 98.8 (27 Jan 2025 08:00), Max: 98.8 (27 Jan 2025 08:00)  HR: 76 (27 Jan 2025 07:00) (62 - 83)  BP: 105/51 (27 Jan 2025 07:00) (105/51 - 194/115)  BP(mean): 74 (27 Jan 2025 07:00) (74 - 149)  RR: 16 (27 Jan 2025 07:00) (10 - 32)  SpO2: 100% (27 Jan 2025 07:00) (93% - 100%)    Parameters below as of 27 Jan 2025 08:00  Patient On (Oxygen Delivery Method): trach, high flow  O2 Flow (L/min): 40  O2 Concentration (%): 40    Mode: standby  FiO2: 40              Physical Exam:   General: Alert and interactive,   Neurology: Oriented, following commands. ambulates  Respiratory: Breath sounds bilaterally, trach collar  CV: Sinus  Abdominal: Soft, Nontender  Extremities: Warm, well-perfused  Right arm tender, but stable  -------------------------------------------------------------------------------------------------------------------------------    Labs:                        9.6    10.28 )-----------( 356      ( 27 Jan 2025 00:54 )             30.7     01-27    131[L]  |  91[L]  |  42[H]  ----------------------------<  87  4.2   |  23  |  6.22[H]    Ca    8.7      27 Jan 2025 00:54  Phos  4.7     01-27  Mg     2.2     01-27    TPro  5.9[L]  /  Alb  2.7[L]  /  TBili  0.5  /  DBili  x   /  AST  17  /  ALT  9[L]  /  AlkPhos  125[H]  01-27    LIVER FUNCTIONS - ( 27 Jan 2025 00:54 )  Alb: 2.7 g/dL / Pro: 5.9 g/dL / ALK PHOS: 125 U/L / ALT: 9 U/L / AST: 17 U/L / GGT: x         ------------------------------------------------------------------------------------------------------------------------------  Assessment:  53 yo M s/p CABG x 3 on 12/30/24    Postop acute respiratory failure  Acute blood loss anemia  Ascites    ESRD on iHD   E coli bacteremia 2/2 pneumonia    Plan:  ***Neuro***  Postoperative acute pain control with Gabapentin and prns.  Gabapentin, robaxin, Tylenol for pain  PT ongoing    ***Cardiovascular***  s/p CABG x 3  A fib s/p cardioversion  On Dobutamine   ASA / Statin daily    ***Pulmonary***  Postoperative acute respiratory failure  Tracheostomy 1/18   s/p Bronchoscopy 1/18. 1/20, 1/21, 1/24  Secretion in Left lung improving  Restart Mucomyst albuterol  Left lung PNA, + e coli  Tolerates trach collar, keep on a vent at night  on HT 3% nebs and albuterol    ***GI***  On Tube feeds at goal, change to vital per nutrition consult  Protonix for stress ulcer prophylaxis.   Ascites: Last paracentesis 1/11  Upper quadrant ultrasound to assess ascites    ***Renal***  ESRD - last HD 1/17: -2.5L UF (MWF)  Nephro-Lisette for renal support    ***ID***   E. coli PNA -  Meropenem. inhaled mary completed  New abscess in right buttocks and coccygeal area  On Zyvox   Meropenum re-started, amikacin single dose  Consult surgery for possible I &D  VRE bacteremia,  on linezolid  Monitor cultures    ***Endocrine***  Hyperglycemia - Insulin sliding scale    ***Hematology***  Acute blood loss anemia  CBC, coags  Continue  Epogen.  Heparin SQ for DVT    *** Lines ***  RUE Midline    Care plan discussed with the ICU care team.   Patient remains critical, at risk for life threatening decompensation.    I have spent 30 minutes providing critical care management to this patient.    By signing my name below, I, Sonia Sánchez, attest that this documentation has been prepared under the direction and in the presence of Blaze Finch MD.  Electronically signed: Sonia Sánchez, 01-27-25 @ 07:50    I, Blaze Finch MD,  personally performed the services described in this documentation. all medical record entries made by the scribe were at my direction and in my presence. I have reviewed the chart and agree that the record reflects my personal performance and is accurate and complete  Electronically signed: Blaze Finch MD. Patient seen and examined at the bedside.    Remains critically ill on continuous ICU monitoring.      Brief Summary:  53 yo M with multiple medical problems including CAD s/p PCI in April 2024 s/p CABG x 3 on 12/30/24 1/2: Intubated for hypoxia & left lung white out s/p awake bronch with removal of copious mucopurulent secretions  1/4: s/p IABP insertion, sepsis/septic shock due to E coli   ESBL pneumonia, collapsed Left Lung, s/p multiple bronch   1/18 tracheostomy tube placement   1/22 VRE E. Faecium Bcx  1/24 +HSV PCR BAL      24 Hour events:  Tolerating iHD  Bradycardia overnight 38 - started on dobutamine 1mcg   HFTC intermittently for 12 hour increments       Objective:  Vital Signs Last 24 Hrs  T(C): 37.1 (27 Jan 2025 08:00), Max: 37.1 (27 Jan 2025 08:00)  T(F): 98.8 (27 Jan 2025 08:00), Max: 98.8 (27 Jan 2025 08:00)  HR: 76 (27 Jan 2025 07:00) (62 - 83)  BP: 105/51 (27 Jan 2025 07:00) (105/51 - 194/115)  BP(mean): 74 (27 Jan 2025 07:00) (74 - 149)  RR: 16 (27 Jan 2025 07:00) (10 - 32)  SpO2: 100% (27 Jan 2025 07:00) (93% - 100%)    Parameters below as of 27 Jan 2025 08:00  Patient On (Oxygen Delivery Method): trach, high flow  O2 Flow (L/min): 40  O2 Concentration (%): 40    Mode: standby  FiO2: 40              Physical Exam:   General: Alert and interactive,   Neurology: Oriented, following commands. ambulates  Respiratory: Breath sounds bilaterally, trach collar  CV: Sinus  Abdominal: Soft, Nontender  Extremities: Warm, well-perfused  Right arm tender, but stable  -------------------------------------------------------------------------------------------------------------------------------    Labs:                        9.6    10.28 )-----------( 356      ( 27 Jan 2025 00:54 )             30.7     01-27    131[L]  |  91[L]  |  42[H]  ----------------------------<  87  4.2   |  23  |  6.22[H]    Ca    8.7      27 Jan 2025 00:54  Phos  4.7     01-27  Mg     2.2     01-27    TPro  5.9[L]  /  Alb  2.7[L]  /  TBili  0.5  /  DBili  x   /  AST  17  /  ALT  9[L]  /  AlkPhos  125[H]  01-27    LIVER FUNCTIONS - ( 27 Jan 2025 00:54 )  Alb: 2.7 g/dL / Pro: 5.9 g/dL / ALK PHOS: 125 U/L / ALT: 9 U/L / AST: 17 U/L / GGT: x         ------------------------------------------------------------------------------------------------------------------------------  Assessment:  53 yo M s/p CABG x 3 on 12/30/24    Postop acute respiratory failure  Acute blood loss anemia  Ascites    ESRD on iHD   E coli bacteremia 2/2 pneumonia  VRE E. Faecium   HSV infection  Tracheomalacia     Plan:  ***Neuro***  Postoperative acute pain control with Gabapentin and prns.  Gabapentin, robaxin, Tylenol for pain  PT ongoing    ***Cardiovascular***  s/p CABG x 3  A fib s/p cardioversion  Dobutamine for chronotropy  TTE today  ASA / Statin daily    ***Pulmonary***  Postoperative acute respiratory failure  Tracheostomy 1/18   s/p Bronchoscopy 1/18. 1/20, 1/21, 1/24  Secretion in Left lung improving  Restart Mucomyst albuterol  Left lung PNA, + e coli  Tolerates trach collar, keep on a vent at night  on HT 3% nebs and albuterol    ***GI***  On Tube feeds at goal, change to vital per nutrition consult  Protonix for stress ulcer prophylaxis.   Ascites: Last paracentesis 1/11  Upper quadrant ultrasound to assess ascites    ***Renal***  ESRD - plan for iHD today - (MWF)  Nephro-Lisette for renal support    ***ID***   E. coli PNA -  Meropenem. inhaled mary completed  New abscess in right buttocks and coccygeal area  Meropenem re-started, amikacin single dose  Consult surgery for possible I &D  VRE bacteremia,  on linezolid - repeat cultures   Monitor cultures  1/24 Acyclovir for + HSV in BAL      ***Endocrine***  Hyperglycemia - Insulin sliding scale    ***Hematology***  Acute blood loss anemia  CBC, coags  Continue  Epogen.  Heparin SQ for DVT  Needs full AC s/p cardioversion    *** Lines ***  RUE Midline    Care plan discussed with the ICU care team.   Patient remains critical, at risk for life threatening decompensation.    I have spent 45 minutes providing critical care management to this patient.    By signing my name below, I, Sonia Sánchez, attest that this documentation has been prepared under the direction and in the presence of Blaze Finch MD.  Electronically signed: Sonia Sánchez, 01-27-25 @ 07:50    I, Blaze Finch MD,  personally performed the services described in this documentation. all medical record entries made by the scribe were at my direction and in my presence. I have reviewed the chart and agree that the record reflects my personal performance and is accurate and complete  Electronically signed: Blaze Finch MD.

## 2025-01-27 NOTE — PROGRESS NOTE ADULT - ASSESSMENT
54M with PMH of HTN, HLD, ESRD on HD MWF via LUE AVF, ascites (requiring repeat paracenteses q4-6wks), NICM with moderate LV dysfunction w/ EF 35-40%(2023) and CAD s/p atherectomy + SALLIE to D1(4/11/2024) presents to [facility] transferred from [facility] for CABG eval  Nephro on [facility] for HD    ESRD on HD   Regular schedule MWF   Access LUE AVF  [facility]  Nephrologist Dr. Bailey  Last HD on 12/24  S/p HD on 12/27 12/29, 12/30 for hyperkalemia and 12/31  2 L PUF On 01/02/2024  However hospital course now complicated by Cardiogenic shock now on multiple pressors,   CRRT initiated 01/03, off since 01/08   S/p PUF on 01/09   HD held on 01/10 due to instability,  S/p HD on 01/17, 1/20  S/p HD on 01/22,  S/p HD on 01/24  HD today   Keep MAP>65  Renal Diet  Consent in physical chart    Hyper/Hypokalemia  Monitor K, will change dialysate fluid as needed    HTN  currently on pressure support  monitor bp     CKD MBD  Can send a PTH  Ca and Phos daily    Anemia  CRISTIANE with HD  Transfuse if hgb <7    CAD with multivessel disease  s/p CABG 12/30  CTICU following    Hypoxic Resp failure requiring Trach  Per surgery  S/p brinchoscopy, transplant pulm following

## 2025-01-27 NOTE — PROGRESS NOTE ADULT - ASSESSMENT
54M with PMH of HTN, HLD, ESRD on HD MWF via LUE AVF, ascites (requiring repeat paracenteses q4-6wks), NICM with moderate LV dysfunction w/ EF 35-40%() and CAD s/p atherectomy + SALLIE to D1(2024) presents to [facility] transferred from [facility]. [facility]. Of note he was recently admitted from - for acute cholecystitis s/p cholecystectomy. [facility], pt was hypoxic to 86% on RA, trop 1.7K, EKG no new changes, CXR fluid overload. Admitted for AHRF 2/2 fluid overload. Pt received HD on , ,  and . DAPT was resumed, TTE showed improvement in LVEF to 40-45%. Stress test was abnormal with 1mm inferior STD, reversible ischemia in the inferior wall and fixed defects in the lateral, anterior and apical walls with post stress EF of 24%.     Pt was then transferred to [facility] for cardiac cath which showed pLAD 70% ISR, mLCx 70%, D1 severe dz.     Patient now transferred to [facility] CTS Dr. Rivers for CABG eval.# CAD s/p NSTEMI  Hx CAD s/p PCI LAD   Presented with ADHF elevated troponins  Positive NST1-Cath- pLAD 70% ISR, mLCx 70%, D1 severe dz.   TTE EF 35% Severe TRWas on Plavix-p2y12- 321 been off Plavix since -POD#1-C3L free LIMA-LAD; SVG-Diag; ULJ-utikFB-Ggmmaloey on  Sinus rhythm,Amio for AF prophylaxis  -OOB off  Sinus rhythm   -POD#3-On /pressors-EF 25-30% RV failure with transaminitis-Sinus Gvbhqm82/03-POD#4-Was intubated for hypoxia-gradual hypotension on /pressors had IABP inserted this morning  -POD#5-s/p IABP insertion, sepsis/septic shock due to GNR/ECOLI HD cath placed   Bronched yesterday 1/3 - minimal secretions with rust-tinged sputum   ESBL-E Coli bacteremia on meropenam    - POD#7 Atrial Fib ,remains intubated weaning IABP 1:3,off pressors on CRRT  -IABP removed.Remains on vent-Alex 10ppm,off Levo- CVP 10 / MAP 71 / Hct 25.6% / Lactate 1.7-Atrial Fibrillation  -Intubated on Alex 10ppm, gtt, BP better-AF~~90's on Amio-had bronch still febrile Tmax 89409/09-Extubated awake alert on HFNC AF,on Dobutrex-CRRT,Cultures no growth    01/10-OOB on BiPAP Sinus Rhythm on -SR/ MAP 57/ CVP 10/  Hct 26.7 / Lact 1.4  -s/p EDU/DCCV-Sinus rhythm on -SR / MAP 52 / CVP 12/ Hct 25.8 / Lact 0.7-had bronch with BAL Left lung  -Sinus rhythm on -for repeat Bronch-SR / MAP 67 / CVP 11 / Hct 27.4% / Lact 0.8  -s/p Trach on  TTE EF 55%-SR / CVP 2 / MAP 63 / Hct 25.9% / Lactate 0.9  -Awake on Trach collar off  c/o buttock pain had bronch yesterday-BAL-Sinus rhythm  -Sinus rhythm on vent at night-BP stable may need to increase hydrallazine 25 mg q8      # Acute on Chronic Systolic Heart Failure now improved  EF-35% ,severe TR  Had HD post op  GDMT post op  LVEF improved post bypass but now at 23-30%-BiV dysfunction on  now off pressors  -TTE EF 40%,trace effusion  ECG c/w pericarditis-was on colchicine   01/15-TTE EF 55%    Vascular evaluated  AVGraft /?steal causing RV failure-'' Very low suspicion of steal Sd and AVF causing current clinical state. ''   VA Duplex Hemodialysis Access, Left (25 @ 13:35) >   Arterial flow volume of 1301 mL/m, and the venous flow volume of  1492 mL/m are consistent with a normally functioning dialysis access  fistula.      # Ascites  Hx chronic ascites  Has drainage q4-6 weeks  Last drained -4600mL  ? ascites related to severe TR  Had vnwoozvpzkjx-Kasitis-ul growth   CT Abdomen 01/10-Large volume ascites-consider paracentesis  Monitor      # ESRD  Now off CRRT transition to HD     # Sacral Decubitus  Seen by Plastics and Gen Surgery

## 2025-01-28 LAB
% CD3 - BAL: 71 % — SIGNIFICANT CHANGE UP
% CD4 - BAL: 51 % — SIGNIFICANT CHANGE UP
% CD8 - BAL: 13 % — SIGNIFICANT CHANGE UP
ALBUMIN SERPL ELPH-MCNC: 2.6 G/DL — LOW (ref 3.3–5)
ALP SERPL-CCNC: 116 U/L — SIGNIFICANT CHANGE UP (ref 40–120)
ALT FLD-CCNC: 12 U/L — SIGNIFICANT CHANGE UP (ref 10–45)
ANION GAP SERPL CALC-SCNC: 12 MMOL/L — SIGNIFICANT CHANGE UP (ref 5–17)
AST SERPL-CCNC: 19 U/L — SIGNIFICANT CHANGE UP (ref 10–40)
BASOPHILS # BLD AUTO: 0.05 K/UL — SIGNIFICANT CHANGE UP (ref 0–0.2)
BASOPHILS NFR BLD AUTO: 0.6 % — SIGNIFICANT CHANGE UP (ref 0–2)
BILIRUB SERPL-MCNC: 0.4 MG/DL — SIGNIFICANT CHANGE UP (ref 0.2–1.2)
BUN SERPL-MCNC: 27 MG/DL — HIGH (ref 7–23)
CALCIUM SERPL-MCNC: 8.3 MG/DL — LOW (ref 8.4–10.5)
CD4:CD8 RATIO - BAL: 3.92 RATIO — SIGNIFICANT CHANGE UP
CHLORIDE SERPL-SCNC: 95 MMOL/L — LOW (ref 96–108)
CO2 SERPL-SCNC: 27 MMOL/L — SIGNIFICANT CHANGE UP (ref 22–31)
CREAT SERPL-MCNC: 4.57 MG/DL — HIGH (ref 0.5–1.3)
CULTURE RESULTS: SIGNIFICANT CHANGE UP
EGFR: 14 ML/MIN/1.73M2 — LOW
EOSINOPHIL # BLD AUTO: 0.47 K/UL — SIGNIFICANT CHANGE UP (ref 0–0.5)
EOSINOPHIL NFR BLD AUTO: 5.6 % — SIGNIFICANT CHANGE UP (ref 0–6)
FUNGITELL B-D-GLUCAN,  BRONCHIAL LAVAGE: SIGNIFICANT CHANGE UP
GLUCOSE SERPL-MCNC: 111 MG/DL — HIGH (ref 70–99)
HADV DNA SPEC QL NAA+PROBE: NEGATIVE — SIGNIFICANT CHANGE UP
HCT VFR BLD CALC: 28 % — LOW (ref 39–50)
HGB BLD-MCNC: 8.7 G/DL — LOW (ref 13–17)
IMM GRANULOCYTES NFR BLD AUTO: 0.6 % — SIGNIFICANT CHANGE UP (ref 0–0.9)
LYMPHOCYTES # BLD AUTO: 0.53 K/UL — LOW (ref 1–3.3)
LYMPHOCYTES # BLD AUTO: 6.3 % — LOW (ref 13–44)
M PNEUMO DNA SPEC QL NAA+PROBE: NEGATIVE — SIGNIFICANT CHANGE UP
MAGNESIUM SERPL-MCNC: 2.1 MG/DL — SIGNIFICANT CHANGE UP (ref 1.6–2.6)
MCHC RBC-ENTMCNC: 31.1 G/DL — LOW (ref 32–36)
MCV RBC AUTO: 93.6 FL — SIGNIFICANT CHANGE UP (ref 80–100)
MONOCYTES # BLD AUTO: 0.79 K/UL — SIGNIFICANT CHANGE UP (ref 0–0.9)
MONOCYTES NFR BLD AUTO: 9.4 % — SIGNIFICANT CHANGE UP (ref 2–14)
NEUTROPHILS NFR BLD AUTO: 77.5 % — HIGH (ref 43–77)
NRBC # BLD: 0 /100 WBCS — SIGNIFICANT CHANGE UP (ref 0–0)
NRBC BLD-RTO: 0 /100 WBCS — SIGNIFICANT CHANGE UP (ref 0–0)
P JIROVECII DNA L RESP QL NAA+NON-PROBE: NEGATIVE — SIGNIFICANT CHANGE UP
PHOSPHATE SERPL-MCNC: 3.9 MG/DL — SIGNIFICANT CHANGE UP (ref 2.5–4.5)
PLATELET # BLD AUTO: 322 K/UL — SIGNIFICANT CHANGE UP (ref 150–400)
POTASSIUM SERPL-MCNC: 4.2 MMOL/L — SIGNIFICANT CHANGE UP (ref 3.5–5.3)
POTASSIUM SERPL-SCNC: 4.2 MMOL/L — SIGNIFICANT CHANGE UP (ref 3.5–5.3)
PROT SERPL-MCNC: 5.5 G/DL — LOW (ref 6–8.3)
RBC # BLD: 2.99 M/UL — LOW (ref 4.2–5.8)
RBC # FLD: 19.5 % — HIGH (ref 10.3–14.5)
SODIUM SERPL-SCNC: 134 MMOL/L — LOW (ref 135–145)
SPECIMEN SOURCE: SIGNIFICANT CHANGE UP
WBC # BLD: 8.4 K/UL — SIGNIFICANT CHANGE UP (ref 3.8–10.5)
WBC # FLD AUTO: 8.4 K/UL — SIGNIFICANT CHANGE UP (ref 3.8–10.5)

## 2025-01-28 PROCEDURE — 99291 CRITICAL CARE FIRST HOUR: CPT | Mod: 25

## 2025-01-28 PROCEDURE — 99232 SBSQ HOSP IP/OBS MODERATE 35: CPT

## 2025-01-28 PROCEDURE — 71045 X-RAY EXAM CHEST 1 VIEW: CPT | Mod: 26,77

## 2025-01-28 PROCEDURE — 71045 X-RAY EXAM CHEST 1 VIEW: CPT | Mod: 26

## 2025-01-28 PROCEDURE — G0545: CPT

## 2025-01-28 PROCEDURE — 31645 BRNCHSC W/THER ASPIR 1ST: CPT

## 2025-01-28 PROCEDURE — 99233 SBSQ HOSP IP/OBS HIGH 50: CPT

## 2025-01-28 PROCEDURE — 99292 CRITICAL CARE ADDL 30 MIN: CPT | Mod: FS,25

## 2025-01-28 RX ORDER — MAGNESIUM SULFATE 500 MG/ML
1 SYRINGE (ML) INJECTION ONCE
Refills: 0 | Status: COMPLETED | OUTPATIENT
Start: 2025-01-28 | End: 2025-01-28

## 2025-01-28 RX ORDER — DOBUTAMINE 250 MG/20ML
2 INJECTION INTRAVENOUS
Qty: 500 | Refills: 0 | Status: DISCONTINUED | OUTPATIENT
Start: 2025-01-28 | End: 2025-01-28

## 2025-01-28 RX ORDER — PROPOFOL 10 MG/ML
20 INJECTION, EMULSION INTRAVENOUS ONCE
Refills: 0 | Status: DISCONTINUED | OUTPATIENT
Start: 2025-01-28 | End: 2025-01-28

## 2025-01-28 RX ORDER — FENTANYL CITRATE-0.9 % NACL/PF 100MCG/2ML
50 SYRINGE (ML) INTRAVENOUS ONCE
Refills: 0 | Status: DISCONTINUED | OUTPATIENT
Start: 2025-01-28 | End: 2025-01-28

## 2025-01-28 RX ORDER — FENTANYL CITRATE-0.9 % NACL/PF 100MCG/2ML
25 SYRINGE (ML) INTRAVENOUS ONCE
Refills: 0 | Status: DISCONTINUED | OUTPATIENT
Start: 2025-01-28 | End: 2025-01-28

## 2025-01-28 RX ORDER — PROPOFOL 10 MG/ML
40 INJECTION, EMULSION INTRAVENOUS ONCE
Refills: 0 | Status: COMPLETED | OUTPATIENT
Start: 2025-01-28 | End: 2025-01-28

## 2025-01-28 RX ORDER — THEOPHYLLINE ANHYDROUS 200 MG
100 TABLET, EXTENDED RELEASE 12 HR ORAL
Refills: 0 | Status: DISCONTINUED | OUTPATIENT
Start: 2025-01-28 | End: 2025-01-31

## 2025-01-28 RX ADMIN — Medication 40 MILLIGRAM(S): at 12:54

## 2025-01-28 RX ADMIN — OXYCODONE HYDROCHLORIDE 10 MILLIGRAM(S): 30 TABLET ORAL at 07:00

## 2025-01-28 RX ADMIN — Medication 50 MICROGRAM(S): at 11:00

## 2025-01-28 RX ADMIN — ACETYLCYSTEINE 4 MILLILITER(S): 200 INHALANT RESPIRATORY (INHALATION) at 23:07

## 2025-01-28 RX ADMIN — LIDOCAINE HYDROCHLORIDE 1 PATCH: 20 JELLY TOPICAL at 12:50

## 2025-01-28 RX ADMIN — DOBUTAMINE 5.11 MICROGRAM(S)/KG/MIN: 250 INJECTION INTRAVENOUS at 03:12

## 2025-01-28 RX ADMIN — Medication 2.5 MILLIGRAM(S): at 18:02

## 2025-01-28 RX ADMIN — ACETYLCYSTEINE 4 MILLILITER(S): 200 INHALANT RESPIRATORY (INHALATION) at 11:07

## 2025-01-28 RX ADMIN — GABAPENTIN 100 MILLIGRAM(S): 400 CAPSULE ORAL at 18:19

## 2025-01-28 RX ADMIN — OXYCODONE HYDROCHLORIDE 10 MILLIGRAM(S): 30 TABLET ORAL at 12:55

## 2025-01-28 RX ADMIN — Medication 1 SPRAY(S): at 06:12

## 2025-01-28 RX ADMIN — HEPARIN SODIUM 5000 UNIT(S): 1000 INJECTION INTRAVENOUS; SUBCUTANEOUS at 12:56

## 2025-01-28 RX ADMIN — Medication 650 MILLIGRAM(S): at 12:54

## 2025-01-28 RX ADMIN — Medication 1 APPLICATION(S): at 21:05

## 2025-01-28 RX ADMIN — Medication 50 MILLIGRAM(S): at 21:25

## 2025-01-28 RX ADMIN — HEPARIN SODIUM 5000 UNIT(S): 1000 INJECTION INTRAVENOUS; SUBCUTANEOUS at 06:10

## 2025-01-28 RX ADMIN — Medication 650 MILLIGRAM(S): at 13:24

## 2025-01-28 RX ADMIN — METHOCARBAMOL 500 MILLIGRAM(S): 500 TABLET, FILM COATED ORAL at 21:25

## 2025-01-28 RX ADMIN — Medication 1 SPRAY(S): at 18:20

## 2025-01-28 RX ADMIN — Medication 2.5 MILLIGRAM(S): at 05:37

## 2025-01-28 RX ADMIN — GABAPENTIN 100 MILLIGRAM(S): 400 CAPSULE ORAL at 06:10

## 2025-01-28 RX ADMIN — METHOCARBAMOL 500 MILLIGRAM(S): 500 TABLET, FILM COATED ORAL at 06:13

## 2025-01-28 RX ADMIN — ACETYLCYSTEINE 4 MILLILITER(S): 200 INHALANT RESPIRATORY (INHALATION) at 05:38

## 2025-01-28 RX ADMIN — Medication 108.6 MILLIGRAM(S): at 21:25

## 2025-01-28 RX ADMIN — Medication 2.5 MILLIGRAM(S): at 23:07

## 2025-01-28 RX ADMIN — LIDOCAINE HYDROCHLORIDE 1 PATCH: 20 JELLY TOPICAL at 19:18

## 2025-01-28 RX ADMIN — Medication 10 MILLILITER(S): at 10:21

## 2025-01-28 RX ADMIN — Medication 81 MILLIGRAM(S): at 12:53

## 2025-01-28 RX ADMIN — LINEZOLID 300 MILLIGRAM(S): 2 INJECTION, SOLUTION INTRAVENOUS at 12:54

## 2025-01-28 RX ADMIN — Medication 100 MILLIGRAM(S): at 19:58

## 2025-01-28 RX ADMIN — ATORVASTATIN CALCIUM 80 MILLIGRAM(S): 80 TABLET, FILM COATED ORAL at 21:25

## 2025-01-28 RX ADMIN — OXYCODONE HYDROCHLORIDE 5 MILLIGRAM(S): 30 TABLET ORAL at 15:08

## 2025-01-28 RX ADMIN — MIRTAZAPINE 7.5 MILLIGRAM(S): 30 TABLET, FILM COATED ORAL at 12:54

## 2025-01-28 RX ADMIN — MEROPENEM 100 MILLIGRAM(S): 1 INJECTION INTRAVENOUS at 10:21

## 2025-01-28 RX ADMIN — ACETYLCYSTEINE 4 MILLILITER(S): 200 INHALANT RESPIRATORY (INHALATION) at 18:01

## 2025-01-28 RX ADMIN — Medication 50 MICROGRAM(S): at 11:15

## 2025-01-28 RX ADMIN — LINEZOLID 300 MILLIGRAM(S): 2 INJECTION, SOLUTION INTRAVENOUS at 00:43

## 2025-01-28 RX ADMIN — OXYCODONE HYDROCHLORIDE 10 MILLIGRAM(S): 30 TABLET ORAL at 06:00

## 2025-01-28 RX ADMIN — OXYCODONE HYDROCHLORIDE 5 MILLIGRAM(S): 30 TABLET ORAL at 15:38

## 2025-01-28 RX ADMIN — HEPARIN SODIUM 5000 UNIT(S): 1000 INJECTION INTRAVENOUS; SUBCUTANEOUS at 21:25

## 2025-01-28 RX ADMIN — Medication 1 TABLET(S): at 12:54

## 2025-01-28 RX ADMIN — Medication 650 MILLIGRAM(S): at 06:00

## 2025-01-28 RX ADMIN — OXYCODONE HYDROCHLORIDE 10 MILLIGRAM(S): 30 TABLET ORAL at 13:24

## 2025-01-28 RX ADMIN — METHOCARBAMOL 500 MILLIGRAM(S): 500 TABLET, FILM COATED ORAL at 12:56

## 2025-01-28 RX ADMIN — Medication 2.5 MILLIGRAM(S): at 11:07

## 2025-01-28 RX ADMIN — PROPOFOL 40 MILLIGRAM(S): 10 INJECTION, EMULSION INTRAVENOUS at 11:00

## 2025-01-28 RX ADMIN — Medication 500 MILLIGRAM(S): at 12:53

## 2025-01-28 RX ADMIN — Medication 1 APPLICATION(S): at 06:11

## 2025-01-28 RX ADMIN — Medication 100 GRAM(S): at 16:02

## 2025-01-28 RX ADMIN — Medication 5 MILLIGRAM(S): at 21:25

## 2025-01-28 RX ADMIN — Medication 650 MILLIGRAM(S): at 07:00

## 2025-01-28 NOTE — PROGRESS NOTE ADULT - SUBJECTIVE AND OBJECTIVE BOX
[facility] [place] [facility]   MD DREW VIZCARRA MD SAKIL BHUIYAN, MD LEILA MENG, NP    TEL:  OFFICE: [phone]  From 5pm-7am Answering Service [phone]    -- RENAL FOLLOW UP NOTE ---Date of Service 01-28-25 @ 12:39    Patient is a 54y old  Male who presents with a chief complaint of MI       Patient seen and examined at bedside. No chest pain/sob    VITALS:  T(F): 98.7 (01-28-25 @ 08:00), Max: 98.9 (01-28-25 @ 00:00)  HR: 62 (01-28-25 @ 11:51)  BP: 143/69 (01-28-25 @ 11:00)  RR: 22 (01-28-25 @ 11:00)  SpO2: 97% (01-28-25 @ 11:51)  Wt(kg): --    01-27 @ 07:01 - 01-28 @ 07:00  --------------------------------------------------------  IN: 2133.7 mL / OUT: 1000 mL / NET: 1133.7 mL    01-28 @ 07:01 - 01-28 @ 12:39  --------------------------------------------------------  IN: 60 mL / OUT: 0 mL / NET: 60 mL          PHYSICAL EXAM:  General: NAD  Neck: No JVD  Respiratory: CTAB, no wheezes, rales or rhonchi  Cardiovascular: S1, S2, RRR  Gastrointestinal: BS+, soft, NT/ND; +NGT  Extremities: No peripheral edema    Hospital Medications:   MEDICATIONS  (STANDING):  acetylcysteine 10%  Inhalation 4 milliLiter(s) Inhalation every 6 hours  acyclovir IVPB 430 milliGRAM(s) IV Intermittent <User Schedule>  albuterol    0.083% 2.5 milliGRAM(s) Nebulizer every 6 hours  ascorbic acid 500 milliGRAM(s) Oral daily  aspirin  chewable 81 milliGRAM(s) Oral daily  atorvastatin 80 milliGRAM(s) Oral at bedtime  bisacodyl Suppository 10 milliGRAM(s) Rectal once  chlorhexidine 0.12% Liquid 15 milliLiter(s) Oral Mucosa every 12 hours  chlorhexidine 2% Cloths 1 Application(s) Topical daily  chlorhexidine 4% Liquid 1 Application(s) Topical <User Schedule>  dextrose 50% Injectable 50 milliLiter(s) IV Push every 15 minutes  DOBUTamine Infusion 2 MICROgram(s)/kG/Min (5.11 mL/Hr) IV Continuous <Continuous>  epoetin ignacia (EPOGEN) Injectable 18852 Unit(s) IV Push <User Schedule>  gabapentin 100 milliGRAM(s) Oral every 12 hours  heparin   Injectable 5000 Unit(s) SubCutaneous every 8 hours  insulin lispro (ADMELOG) corrective regimen sliding scale   SubCutaneous every 6 hours  lidocaine   4% Patch 1 Patch Transdermal daily  linezolid  IVPB      linezolid  IVPB 600 milliGRAM(s) IV Intermittent every 12 hours  melatonin 5 milliGRAM(s) Oral at bedtime  meropenem  IVPB 500 milliGRAM(s) IV Intermittent every 24 hours  meropenem  IVPB      methocarbamol 500 milliGRAM(s) Oral every 8 hours  mirtazapine 7.5 milliGRAM(s) Oral daily  Nephro-elicia 1 Tablet(s) Oral daily  pantoprazole  Injectable 40 milliGRAM(s) IV Push daily  polyethylene glycol 3350 17 Gram(s) Oral two times a day  sodium chloride 0.65% Nasal 1 Spray(s) Both Nostrils every 12 hours  sodium chloride 0.9%. 1000 milliLiter(s) (10 mL/Hr) IV Continuous <Continuous>  traZODone 50 milliGRAM(s) Oral at bedtime      LABS:  01-28    134[L]  |  95[L]  |  27[H]  ----------------------------<  111[H]  4.2   |  27  |  4.57[H]    Ca    8.3[L]      28 Jan 2025 00:24  Phos  3.9     01-28  Mg     2.1     01-28    TPro  5.5[L]  /  Alb  2.6[L]  /  TBili  0.4  /  DBili      /  AST  19  /  ALT  12  /  AlkPhos  116  01-28    Creatinine Trend: 4.57 <--, 6.22 <--, 5.22 <--, 4.32 <--, 5.64 <--, 4.69 <--, 5.89 <--    Albumin: 2.6 g/dL (01-28 @ 00:24)  Phosphorus: 3.9 mg/dL (01-28 @ 00:24)                              8.7    8.40  )-----------( 322      ( 28 Jan 2025 00:24 )             28.0     Urine Studies:  Urinalysis - [01-28-25 @ 00:24]      Color  / Appearance  / SG  / pH       Gluc 111 / Ketone   / Bili  / Urobili        Blood  / Protein  / Leuk Est  / Nitrite       RBC  / WBC  / Hyaline  / Gran  / Sq Epi  / Non Sq Epi  / Bacteria       Iron 29, TIBC 143, %sat 20      [12-19-24 @ 05:00]  Ferritin 904      [12-19-24 @ 05:00]  TSH 4.75      [12-24-24 @ 06:50]        RADIOLOGY & ADDITIONAL STUDIES:   [facility] [place] [facility]   MD DREW VIZCARRA MD SAKIL BHUIYAN, MD LEILA MENG, NP    TEL:  OFFICE: [phone]  From 5pm-7am Answering Service [phone]    -- RENAL FOLLOW UP NOTE ---Date of Service 01-28-25 @ 12:39    Patient is a 54y old  Male who presents with a chief complaint of MI       Patient seen and examined at bedside. Patient has a trach in no acute distress.    VITALS:  T(F): 98.7 (01-28-25 @ 08:00), Max: 98.9 (01-28-25 @ 00:00)  HR: 62 (01-28-25 @ 11:51)  BP: 143/69 (01-28-25 @ 11:00)  RR: 22 (01-28-25 @ 11:00)  SpO2: 97% (01-28-25 @ 11:51)  Wt(kg): --    01-27 @ 07:01 - 01-28 @ 07:00  --------------------------------------------------------  IN: 2133.7 mL / OUT: 1000 mL / NET: 1133.7 mL    01-28 @ 07:01 - 01-28 @ 12:39  --------------------------------------------------------  IN: 60 mL / OUT: 0 mL / NET: 60 mL          PHYSICAL EXAM:  General: NAD  Neck: No JVD  Respiratory: CTAB, no wheezes, rales or rhonchi; +trach  Cardiovascular: S1, S2, RRR  Gastrointestinal: BS+, soft, NT/ND; +NGT  Extremities: No peripheral edema    Hospital Medications:   MEDICATIONS  (STANDING):  acetylcysteine 10%  Inhalation 4 milliLiter(s) Inhalation every 6 hours  acyclovir IVPB 430 milliGRAM(s) IV Intermittent <User Schedule>  albuterol    0.083% 2.5 milliGRAM(s) Nebulizer every 6 hours  ascorbic acid 500 milliGRAM(s) Oral daily  aspirin  chewable 81 milliGRAM(s) Oral daily  atorvastatin 80 milliGRAM(s) Oral at bedtime  bisacodyl Suppository 10 milliGRAM(s) Rectal once  chlorhexidine 0.12% Liquid 15 milliLiter(s) Oral Mucosa every 12 hours  chlorhexidine 2% Cloths 1 Application(s) Topical daily  chlorhexidine 4% Liquid 1 Application(s) Topical <User Schedule>  dextrose 50% Injectable 50 milliLiter(s) IV Push every 15 minutes  DOBUTamine Infusion 2 MICROgram(s)/kG/Min (5.11 mL/Hr) IV Continuous <Continuous>  epoetin ignacia (EPOGEN) Injectable 59854 Unit(s) IV Push <User Schedule>  gabapentin 100 milliGRAM(s) Oral every 12 hours  heparin   Injectable 5000 Unit(s) SubCutaneous every 8 hours  insulin lispro (ADMELOG) corrective regimen sliding scale   SubCutaneous every 6 hours  lidocaine   4% Patch 1 Patch Transdermal daily  linezolid  IVPB      linezolid  IVPB 600 milliGRAM(s) IV Intermittent every 12 hours  melatonin 5 milliGRAM(s) Oral at bedtime  meropenem  IVPB 500 milliGRAM(s) IV Intermittent every 24 hours  meropenem  IVPB      methocarbamol 500 milliGRAM(s) Oral every 8 hours  mirtazapine 7.5 milliGRAM(s) Oral daily  Nephro-elicia 1 Tablet(s) Oral daily  pantoprazole  Injectable 40 milliGRAM(s) IV Push daily  polyethylene glycol 3350 17 Gram(s) Oral two times a day  sodium chloride 0.65% Nasal 1 Spray(s) Both Nostrils every 12 hours  sodium chloride 0.9%. 1000 milliLiter(s) (10 mL/Hr) IV Continuous <Continuous>  traZODone 50 milliGRAM(s) Oral at bedtime      LABS:  01-28    134[L]  |  95[L]  |  27[H]  ----------------------------<  111[H]  4.2   |  27  |  4.57[H]    Ca    8.3[L]      28 Jan 2025 00:24  Phos  3.9     01-28  Mg     2.1     01-28    TPro  5.5[L]  /  Alb  2.6[L]  /  TBili  0.4  /  DBili      /  AST  19  /  ALT  12  /  AlkPhos  116  01-28    Creatinine Trend: 4.57 <--, 6.22 <--, 5.22 <--, 4.32 <--, 5.64 <--, 4.69 <--, 5.89 <--    Albumin: 2.6 g/dL (01-28 @ 00:24)  Phosphorus: 3.9 mg/dL (01-28 @ 00:24)                              8.7    8.40  )-----------( 322      ( 28 Jan 2025 00:24 )             28.0     Urine Studies:  Urinalysis - [01-28-25 @ 00:24]      Color  / Appearance  / SG  / pH       Gluc 111 / Ketone   / Bili  / Urobili        Blood  / Protein  / Leuk Est  / Nitrite       RBC  / WBC  / Hyaline  / Gran  / Sq Epi  / Non Sq Epi  / Bacteria       Iron 29, TIBC 143, %sat 20      [12-19-24 @ 05:00]  Ferritin 904      [12-19-24 @ 05:00]  TSH 4.75      [12-24-24 @ 06:50]        RADIOLOGY & ADDITIONAL STUDIES:

## 2025-01-28 NOTE — CONSULT NOTE ADULT - SUBJECTIVE AND OBJECTIVE BOX
HISTORY OF PRESENT ILLNESS:  55 y/o male with PMHx of hypertension, ESRD on HD, CAD (s/p PCI in 4/2024), chronic systolic heart failure, chronic ascites, recent admission for cholecystitis s/p cholecystectomy who was admitted for acute decompensated heart failure and found to have abnormal nuclear stress. LHC showed multi-vessel coronary disease, for which he underwent a CABG (LIMA - LAD | SVG - D1 | SVG - LPL) on 12/30/24. Post-op course complicated by shock requiring IABP + dobutamine, ESBL E. coli bacteremia, new-onset atrial fibrillation (1/6/25) for which pt underwent EDU/DCCV in CTU on 1/10/25. C/b ESBL pneumonia, collapsed Left Lung, s/p multiple bronch and tracheostomy 1/17. EP reconsulted for sinus bradycardia overnight/this AM. Pt awake, indicates he was asleep during the overnight event and was asymptomatic. States he was awake at 0700 and was watching TV, asymptomatic with drop in HR. BP down to ~90s/50s during event and now pt back on dobutamine.     Allergies  No Known Allergies  Intolerances      MEDICATIONS:  aspirin  chewable 81 milliGRAM(s) Oral daily  heparin   Injectable 5000 Unit(s) SubCutaneous every 8 hours  acyclovir IVPB 430 milliGRAM(s) IV Intermittent <User Schedule>  linezolid  IVPB      linezolid  IVPB 600 milliGRAM(s) IV Intermittent every 12 hours  meropenem  IVPB 500 milliGRAM(s) IV Intermittent every 24 hours  meropenem  IVPB      acetylcysteine 10%  Inhalation 4 milliLiter(s) Inhalation every 6 hours  albuterol    0.083% 2.5 milliGRAM(s) Nebulizer every 6 hours  theophylline ER Capsule 100 milliGRAM(s) Oral <User Schedule>  acetaminophen     Tablet .. 650 milliGRAM(s) Oral every 6 hours PRN  gabapentin 100 milliGRAM(s) Oral every 12 hours  melatonin 5 milliGRAM(s) Oral at bedtime  methocarbamol 500 milliGRAM(s) Oral every 8 hours  mirtazapine 7.5 milliGRAM(s) Oral daily  oxyCODONE    IR 5 milliGRAM(s) Oral every 6 hours PRN  oxyCODONE    IR 10 milliGRAM(s) Oral every 6 hours PRN  traZODone 50 milliGRAM(s) Oral at bedtime  bisacodyl Suppository 10 milliGRAM(s) Rectal once  pantoprazole  Injectable 40 milliGRAM(s) IV Push daily  polyethylene glycol 3350 17 Gram(s) Oral two times a day  atorvastatin 80 milliGRAM(s) Oral at bedtime  dextrose 50% Injectable 50 milliLiter(s) IV Push every 15 minutes  insulin lispro (ADMELOG) corrective regimen sliding scale   SubCutaneous every 6 hours  ascorbic acid 500 milliGRAM(s) Oral daily  benzocaine 20% Spray 1 Spray(s) Topical every 6 hours PRN  chlorhexidine 0.12% Liquid 15 milliLiter(s) Oral Mucosa every 12 hours  chlorhexidine 2% Cloths 1 Application(s) Topical daily  chlorhexidine 4% Liquid 1 Application(s) Topical <User Schedule>  epoetin ignacia (EPOGEN) Injectable 00142 Unit(s) IV Push <User Schedule>  lidocaine   4% Patch 1 Patch Transdermal daily  lidocaine/prilocaine Cream 1 Application(s) Topical daily PRN  Nephro-elicia 1 Tablet(s) Oral daily  sodium chloride 0.65% Nasal 1 Spray(s) Both Nostrils every 12 hours  sodium chloride 0.9% lock flush 10 milliLiter(s) IV Push every 1 hour PRN  sodium chloride 0.9%. 1000 milliLiter(s) IV Continuous <Continuous>    PAST MEDICAL & SURGICAL HISTORY:  Type 2 diabetes mellitus      Hypertension      End stage renal disease  started HD 2/2019 T, Th, Sat via right chest permacath      Bone spur  right shoulder- hx of - sx done      Anemia      Injury of right wrist  hx of at age 14      History of hyperkalemia  before HD- K-6.2 - to repeat in am of sx, pt had HD today      S/P arthroscopy of right shoulder  2010      History of vascular access device  right chest permacath - 2/2019      H/O right wrist surgery  tendons repair - at age 14      AV fistula      H/O ventral hernia repair      S/P cholecystectomy          FAMILY HISTORY:  FH: hypertension  mother, father- alive    FH: type 2 diabetes  mother, father- alive      REVIEW OF SYSTEMS:  See HPI. Otherwise, 12 point ROS done and otherwise negative.    PHYSICAL EXAM:  T(C): 36.9 (01-28-25 @ 16:00), Max: 37.3 (01-28-25 @ 12:00)  HR: 80 (01-28-25 @ 16:00) (49 - 93)  BP: 133/63 (01-28-25 @ 16:00) (65/31 - 169/77)  RR: 20 (01-28-25 @ 16:00) (12 - 45)  SpO2: 98% (01-28-25 @ 16:00) (94% - 100%)  Wt(kg): --  I&O's Summary    27 Jan 2025 07:01  -  28 Jan 2025 07:00  --------------------------------------------------------  IN: 2133.7 mL / OUT: 1000 mL / NET: 1133.7 mL    28 Jan 2025 07:01  -  28 Jan 2025 17:28  --------------------------------------------------------  IN: 845 mL / OUT: 0 mL / NET: 845 mL        Appearance: Normal	  HEENT: NC/AT  Cardiovascular: Normal S1 S2  Respiratory: trach  Psychiatry: A & O x 3, Mood & affect appropriate  Neurologic: Non-focal  Extremities: No BLE edema    LABS:	 	    CBC Full  -  ( 28 Jan 2025 00:24 )  WBC Count : 8.40 K/uL  Hemoglobin : 8.7 g/dL  Hematocrit : 28.0 %  Platelet Count - Automated : 322 K/uL  Mean Cell Volume : 93.6 fl  Mean Cell Hemoglobin : 29.1 pg  Mean Cell Hemoglobin Concentration : 31.1 g/dL  Auto Neutrophil # : 6.51 K/uL  Auto Lymphocyte # : 0.53 K/uL  Auto Monocyte # : 0.79 K/uL  Auto Eosinophil # : 0.47 K/uL  Auto Basophil # : 0.05 K/uL  Auto Neutrophil % : 77.5 %  Auto Lymphocyte % : 6.3 %  Auto Monocyte % : 9.4 %  Auto Eosinophil % : 5.6 %  Auto Basophil % : 0.6 %    01-28    134[L]  |  95[L]  |  27[H]  ----------------------------<  111[H]  4.2   |  27  |  4.57[H]  01-27    131[L]  |  91[L]  |  42[H]  ----------------------------<  87  4.2   |  23  |  6.22[H]    Ca    8.3[L]      28 Jan 2025 00:24  Ca    8.7      27 Jan 2025 00:54  Phos  3.9     01-28  Phos  4.7     01-27  Mg     2.1     01-28  Mg     2.2     01-27    TPro  5.5[L]  /  Alb  2.6[L]  /  TBili  0.4  /  DBili  x   /  AST  19  /  ALT  12  /  AlkPhos  116  01-28  TPro  5.9[L]  /  Alb  2.7[L]  /  TBili  0.5  /  DBili  x   /  AST  17  /  ALT  9[L]  /  AlkPhos  125[H]  01-27      proBNP: Pro-Brain Natriuretic Peptide (12.24.24 @ 06:50)    Pro-Brain Natriuretic Peptide: >07783 pg/mL    TSH: Free Thyroxine, Serum (12.24.24 @ 06:50)    Free Thyroxine, Serum: 1.0 ng/dL    CARDIAC MARKERS:Troponin I, High Sensitivity (12.20.24 @ 05:26)    Troponin I, High Sensitivity Result: 1458.2: Serial measurements of high sensitivity troponin I (hs TnI) showing rapid  upward or downward changes suggest acute myocardial injury.  Please note  that a sustained elevation of hsTnI can be caused by renal disease,  chronic heart failure, sepsis, pulmonary embolism and other clinical  conditions. ng/L    < from: CT Chest No Cont (01.26.25 @ 14:48) >    IMPRESSION:  Decreased consolidation of the left upper and lower lobes.  Decreased partially loculated small left pleural effusion.  Increased small right pleural effusion.  Stable small to moderate pericardial effusion.    < end of copied text >    RADIOLOGY: < from: Xray Chest 1 View- PORTABLE-Urgent (Xray Chest 1 View- PORTABLE-Urgent .) (01.28.25 @ 12:49) >    COMPARISON: Chest x-ray 1/26/2025.    FINDINGS:    1/27/2025, 6:16 AM  Tracheostomy. Enteric tube courses below diaphragm with tip collimated   out of view. Median sternotomy.  Small left pleural effusion. Perihilar interstitial opacities. Bibasilar   atelectasis. No focal consolidation or pneumothorax.  The heart size cannot be accurately assessed on this projection.  No acute osseous abnormalities.    1/28/2025, 6:03 AM  No significant interval change    1/28/2025, 12:32 PM  No significant interval change.    IMPRESSION:  Mild pulmonary edema. Small left pleural effusion. Bibasilar atelectasis.    < end of copied text >  PREVIOUS DIAGNOSTIC TESTING:    [ ] Echocardiogram: < from: TTE W or WO Ultrasound Enhancing Agent (01.27.25 @ 12:42) >     CONCLUSIONS:      1. This was a limited study to assess left ventricular systolic function.   2. Left ventricular systolic function is normal with an ejection fraction of 60 % by Michaels's method of disks.   3. Moderately enlarged right ventricular cavity size and mild to moderately reduced right ventricular systolic function.   4. Moderate pericardial effusion noted adjacent to the right atrium.   5. There is no definitive evidence of RA diastolic collapse in the available views. The pericardium proximal to the RV free wall was not well visualized. Due to limited views (as above, and no subcostal views), the study is indeterminate for the presence of echocardiographic signs of tamponade.   6. Left atrium is severely dilated.   7. Compared to the transthoracic echocardiogram performed on 1/19/2025, this was also a limited study. The area proximal to the right atrial free wall is better visualized on the present study and the echofree space most consistent with a pericardial effusion is more evident. There is again visual evidence of right ventricular dysfunction.      < end of copied text >    [ ]  Catheterization: < from: Cardiac Catheterization (12.23.24 @ 17:55) >  Diagnostic Conclusions:   Severe in-stent restenosis in LAD/D1 bifurcation. Severe in-stent  stenosis in mid Cx. Elevated LVEDP 25 mmHg.    Recommendations:   Consider CTS evaluation for possible CABG. Optimize medical therapy  for ischemic heart disease. Intensify anti-anginal  regimen. Aggressive risk factor modifcation.     < end of copied text >

## 2025-01-28 NOTE — PROGRESS NOTE ADULT - ASSESSMENT
54M with PMH of HTN, HLD, ESRD on HD MWF via LUE AVF, ascites (requiring repeat paracenteses q4-6wks), NICM with moderate LV dysfunction w/ EF 35-40%() and CAD s/p atherectomy + SALLIE to D1(2024) presents to [facility] transferred from [facility]. Patient initially presented to [facility] ED with shortness of breath. Of note he was recently admitted from - for acute cholecystitis s/p cholecystectomy. In [facility] ED, pt was hypoxic to 86% on RA, trop 1.7K, EKG no new changes, CXR fluid overload. Admitted for AHRF 2/2 fluid overload. Pt received HD on , ,  and . DAPT was resumed, TTE showed improvement in LVEF to 40-45%. Stress test was abnormal with 1mm inferior STD, reversible ischemia in the inferior wall and fixed defects in the lateral, anterior and apical walls with post stress EF of 24%.     Pt was then transferred to [facility] for cardiac cath which showed pLAD 70% ISR, mLCx 70%, D1 severe dz.     Patient now transferred to [facility] CTS Dr. Rivers for CABG eval.# CAD s/p NSTEMI  Hx CAD s/p PCI LAD   Presented with ADHF elevated troponins  Positive NST1-Cath- pLAD 70% ISR, mLCx 70%, D1 severe dz.   TTE EF 35% Severe TRWas on Plavix-p2y12- 321 been off Plavix since -POD#1-C3L free LIMA-LAD; SVG-Diag; AAP-wvtoLO-Ihcwxfkrh on  Sinus rhythm,Amio for AF prophylaxis  -OOB off  Sinus rhythm   -POD#3-On /pressors-EF 25-30% RV failure with transaminitis-Sinus Owaesc27/03-POD#4-Was intubated for hypoxia-gradual hypotension on /pressors had IABP inserted this morning  -POD#5-s/p IABP insertion, sepsis/septic shock due to GNR/ECOLI HD cath placed   Bronched yesterday 1/3 - minimal secretions with rust-tinged sputum   ESBL-E Coli bacteremia on meropenam    - POD#7 Atrial Fib ,remains intubated weaning IABP 1:3,off pressors on CRRT  -IABP removed.Remains on vent-Alex 10ppm,off Levo- CVP 10 / MAP 71 / Hct 25.6% / Lactate 1.7-Atrial Fibrillation  -Intubated on Alex 10ppm, gtt, BP better-AF~~90's on Amio-had bronch still febrile Tmax 77907/09-Extubated awake alert on HFNC AF,on Dobutrex-CRRT,Cultures no growth    01/10-OOB on BiPAP Sinus Rhythm on -SR/ MAP 57/ CVP 10/  Hct 26.7 / Lact 1.4  -s/p EDU/DCCV-Sinus rhythm on -SR / MAP 52 / CVP 12/ Hct 25.8 / Lact 0.7-had bronch with BAL Left lung  -Sinus rhythm on -for repeat Bronch-SR / MAP 67 / CVP 11 / Hct 27.4% / Lact 0.8  -s/p Trach on  TTE EF 55%-SR / CVP 2 / MAP 63 / Hct 25.9% / Lactate 0.9  -Awake on Trach collar off  c/o buttock pain had bronch yesterday-BAL-Sinus rhythm  -Sinus rhythm on vent at night-BP stable may need to increase hydrallazine 25 mg q8      # Acute on Chronic Systolic Heart Failure now improved  EF-35% ,severe TR  Had HD post op  GDMT post op  LVEF improved post bypass but now at 23-30%-BiV dysfunction on  now off pressors  -TTE EF 40%,trace effusion  ECG c/w pericarditis-was on colchicine   01/15-TTE EF 55%    Vascular evaluated  AVGraft /?steal causing RV failure-'' Very low suspicion of steal Sd and AVF causing current clinical state. ''   VA Duplex Hemodialysis Access, Left (25 @ 13:35) >   Arterial flow volume of 1301 mL/m, and the venous flow volume of  1492 mL/m are consistent with a normally functioning dialysis access  fistula.      # Ascites  Hx chronic ascites  Has drainage q4-6 weeks  Last drained -4600mL  ? ascites related to severe TR  Had bdbvxkyezfsu-Wkboatk-qv growth   CT Abdomen 01/10-Large volume ascites-consider paracentesis  Monitor      # ESRD  Now off CRRT transition to HD     # Sacral Decubitus  Seen by Plastics and Gen Surgery

## 2025-01-28 NOTE — PROGRESS NOTE ADULT - SUBJECTIVE AND OBJECTIVE BOX
Patient is a 54y old  Male who presents with a chief complaint of MI (24 Jan 2025 10:30)      HPI:  54M with PMH of HTN, HLD, ESRD on HD MWF via LUE AVF, ascites (requiring repeat paracenteses q4-6wks), NICM with moderate LV dysfunction w/ EF 35-40%(2023) and CAD s/p atherectomy + SALLIE to D1(4/11/2024) presents to [facility] transferred from [facility]. Patient initially presented to [facility] ED with shortness of breath. Of note he was recently admitted from 09/27-12/02 for acute cholecystitis s/p cholecystectomy. In [facility] ED, pt was hypoxic to 86% on RA, trop 1.7K, EKG no new changes, CXR fluid overload. Admitted for AHRF 2/2 fluid overload. Pt received HD on 12/18, 12/19, 12/20 and 12/22. DAPT was resumed, TTE showed improvement in LVEF to 40-45%. Stress test was abnormal with 1mm inferior STD, reversible ischemia in the inferior wall and fixed defects in the lateral, anterior and apical walls with post stress EF of 24%. Pt was then transferred to [facility] for cardiac cath which showed pLAD 70% ISR, mLCx 70%, D1 severe dz. Patient now transferred to [facility] CTS Dr. Rivers for CABG eval. Patient denies any current SOB or chest pain on admission. Otherwise denies headaches, abdominal pain, urinary or bowel changes, fevers, chills, N/V/D, sick contacts.  (24 Dec 2024 05:07)      PAST MEDICAL & SURGICAL HISTORY:  Type 2 diabetes mellitus      Hypertension      End stage renal disease  started HD 2/2019 T, Th, Sat via right chest permacath      Bone spur  right shoulder- hx of - sx done      Anemia      Injury of right wrist  hx of at age 14      History of hyperkalemia  before HD- K-6.2 - to repeat in am of sx, pt had HD today      S/P arthroscopy of right shoulder  2010      History of vascular access device  right chest permacath - 2/2019      H/O right wrist surgery  tendons repair - at age 14      AV fistula      H/O ventral hernia repair      S/P cholecystectomy          PREVIOUS DIAGNOSTIC TESTING:      ECHO  FINDINGS:    STRESS  FINDINGS:    CATHETERIZATION  FINDINGS:    MEDICATIONS  (STANDING):  acetylcysteine 10%  Inhalation 4 milliLiter(s) Inhalation every 6 hours  acyclovir IVPB 430 milliGRAM(s) IV Intermittent <User Schedule>  albuterol    0.083% 2.5 milliGRAM(s) Nebulizer every 6 hours  ascorbic acid 500 milliGRAM(s) Oral daily  aspirin  chewable 81 milliGRAM(s) Oral daily  atorvastatin 80 milliGRAM(s) Oral at bedtime  bisacodyl Suppository 10 milliGRAM(s) Rectal once  chlorhexidine 0.12% Liquid 15 milliLiter(s) Oral Mucosa every 12 hours  chlorhexidine 2% Cloths 1 Application(s) Topical daily  chlorhexidine 4% Liquid 1 Application(s) Topical <User Schedule>  dextrose 50% Injectable 50 milliLiter(s) IV Push every 15 minutes  epoetin ignacia (EPOGEN) Injectable 73296 Unit(s) IV Push <User Schedule>  gabapentin 100 milliGRAM(s) Oral every 12 hours  heparin   Injectable 5000 Unit(s) SubCutaneous every 8 hours  insulin lispro (ADMELOG) corrective regimen sliding scale   SubCutaneous every 6 hours  lidocaine   4% Patch 1 Patch Transdermal daily  linezolid  IVPB      linezolid  IVPB 600 milliGRAM(s) IV Intermittent every 12 hours  melatonin 5 milliGRAM(s) Oral at bedtime  meropenem  IVPB 500 milliGRAM(s) IV Intermittent every 24 hours  meropenem  IVPB      methocarbamol 500 milliGRAM(s) Oral every 8 hours  mirtazapine 7.5 milliGRAM(s) Oral daily  Nephro-elicia 1 Tablet(s) Oral daily  pantoprazole  Injectable 40 milliGRAM(s) IV Push daily  polyethylene glycol 3350 17 Gram(s) Oral two times a day  sodium chloride 0.65% Nasal 1 Spray(s) Both Nostrils every 12 hours  sodium chloride 0.9%. 1000 milliLiter(s) (10 mL/Hr) IV Continuous <Continuous>  theophylline ER Capsule 100 milliGRAM(s) Oral <User Schedule>  traZODone 50 milliGRAM(s) Oral at bedtime    MEDICATIONS  (PRN):  acetaminophen     Tablet .. 650 milliGRAM(s) Oral every 6 hours PRN Mild Pain (1 - 3)  benzocaine 20% Spray 1 Spray(s) Topical every 6 hours PRN throat pain  lidocaine/prilocaine Cream 1 Application(s) Topical daily PRN pre-HD  oxyCODONE    IR 5 milliGRAM(s) Oral every 6 hours PRN Moderate Pain (4 - 6)  oxyCODONE    IR 10 milliGRAM(s) Oral every 6 hours PRN Severe Pain (7 - 10)  sodium chloride 0.9% lock flush 10 milliLiter(s) IV Push every 1 hour PRN Pre/post blood products, medications, blood draw, and to maintain line patency      FAMILY HISTORY:  FH: hypertension  mother, father- alive    FH: type 2 diabetes  mother, father- alive        SOCIAL HISTORY:    CIGARETTES:    ALCOHOL:    REVIEW OF SYSTEMS:  CONSTITUTIONAL: No fever, weight loss, or fatigue  EYES: No eye pain, visual disturbances, or discharge  ENMT:  No difficulty hearing, tinnitus, vertigo; No sinus or throat pain  NECK: No pain or stiffness  RESPIRATORY: No cough, wheezing, chills or hemoptysis; No shortness of breath  CARDIOVASCULAR: No chest pain, palpitations, dizziness, or leg swelling  GASTROINTESTINAL: No abdominal or epigastric pain. No nausea, vomiting, or hematemesis; No diarrhea or constipation. No melena or hematochezia.  GENITOURINARY: No dysuria, frequency, hematuria, or incontinence  NEUROLOGICAL: No headaches, memory loss, loss of strength, numbness, or tremors  SKIN: No itching, burning, rashes, or lesions   LYMPH NODES: No enlarged glands  ENDOCRINE: No heat or cold intolerance; No hair loss  MUSCULOSKELETAL: No joint pain or swelling; No muscle, back, or extremity pain  PSYCHIATRIC: No depression, anxiety, mood swings, or difficulty sleeping  HEME/LYMPH: No easy bruising, or bleeding gums  ALLERY AND IMMUNOLOGIC: No hives or eczema    Vital Signs Last 24 Hrs  T(C): 36.8 (28 Jan 2025 20:00), Max: 37.3 (28 Jan 2025 12:00)  T(F): 98.2 (28 Jan 2025 20:00), Max: 99.1 (28 Jan 2025 12:00)  HR: 62 (28 Jan 2025 21:00) (49 - 93)  BP: 139/66 (28 Jan 2025 21:00) (65/31 - 178/81)  BP(mean): 95 (28 Jan 2025 21:00) (41 - 117)  RR: 22 (28 Jan 2025 21:00) (12 - 45)  SpO2: 100% (28 Jan 2025 21:00) (94% - 100%)    Parameters below as of 28 Jan 2025 20:00  Patient On (Oxygen Delivery Method): TC  O2 Flow (L/min): 40  O2 Concentration (%): 40      PHYSICAL EXAM:  GENERAL: NAD, well-groomed, well-developed  HEAD:  Atraumatic, Normocephalic  EYES: EOMI, PERRLA, conjunctiva and sclera clear  ENMT: No tonsillar erythema, exudates, or enlargement; Moist mucous membranes, Good dentition, No lesions  NECK: Supple, No JVD, Normal thyroid  NERVOUS SYSTEM:  Alert & Oriented X3, Good concentration; Motor Strength 5/5 B/L upper and lower extremities; DTRs 2+ intact and symmetric  CHEST/LUNG: Clear to percussion bilaterally; No rales, rhonchi, wheezing, or rubs  HEART: Regular rate and rhythm; No murmurs, rubs, or gallops  ABDOMEN: Soft, Nontender, Nondistended; Bowel sounds present  EXTREMITIES:  2+ Peripheral Pulses, No clubbing, cyanosis, or edema  LYMPH: No lymphadenopathy noted  SKIN: No rashes or lesions      INTERPRETATION OF TELEMETRY:    ECG:    CHADSVASC:     LABS:                        8.7    8.40  )-----------( 322      ( 28 Jan 2025 00:24 )             28.0     01-28    134[L]  |  95[L]  |  27[H]  ----------------------------<  111[H]  4.2   |  27  |  4.57[H]    Ca    8.3[L]      28 Jan 2025 00:24  Phos  3.9     01-28  Mg     2.1     01-28    TPro  5.5[L]  /  Alb  2.6[L]  /  TBili  0.4  /  DBili  x   /  AST  19  /  ALT  12  /  AlkPhos  116  01-28          Urinalysis Basic - ( 28 Jan 2025 00:24 )    Color: x / Appearance: x / SG: x / pH: x  Gluc: 111 mg/dL / Ketone: x  / Bili: x / Urobili: x   Blood: x / Protein: x / Nitrite: x   Leuk Esterase: x / RBC: x / WBC x   Sq Epi: x / Non Sq Epi: x / Bacteria: x      Lipid Panel:   I&O's Summary    27 Jan 2025 07:01 - 28 Jan 2025 07:00  --------------------------------------------------------  IN: 2133.7 mL / OUT: 1000 mL / NET: 1133.7 mL    28 Jan 2025 07:01  -  28 Jan 2025 21:46  --------------------------------------------------------  IN: 1210 mL / OUT: 0 mL / NET: 1210 mL        RADIOLOGY & ADDITIONAL STUDIES:    < from: TTE W or WO Ultrasound Enhancing Agent (01.27.25 @ 12:42) >  CONCLUSIONS:      1. This was a limited study to assess left ventricular systolic function.   2. Left ventricular systolic function is normal with an ejection fraction of 60 % by Michaels's method of disks.   3. Moderately enlarged right ventricular cavity size and mild to moderately reduced right ventricular systolic function.   4. Moderate pericardial effusion noted adjacent to the right atrium.   5. There is no definitive evidence of RA diastolic collapse in the available views. The pericardium proximal to the RV free wall was not well visualized. Due to limited views (as above, and no subcostal views), the study is indeterminate for the presence of echocardiographic signs of tamponade.   6. Left atrium is severely dilated.   7. Compared to the transthoracic echocardiogram performed on 1/19/2025, this was also a limited study. The area proximal to the right atrial free wall is better visualized on the present study and the echofree space most consistent with a pericardial effusion is more evident. There is again visual evidence of right ventricular dysfunction.    < end of copied text >

## 2025-01-28 NOTE — PROGRESS NOTE ADULT - ASSESSMENT
54M with HTN, HLD, ESRD on HD MWF via LUE AVF, ascites (requiring repeat paracenteses q4-6wks), NICM with moderate LV dysfunction w/ EF 35-40%() and CAD s/p atherectomy + SALLIE to D1 (2024) presents to [facility] 2024 as transfer from [facility] (via [facility]) where he presented  with SOB.   In [facility] ED, pt was hypoxic to 86% on RA, trop 1.7K, EKG no new changes, CXR fluid overload. Admitted for AHRF 2/2 fluid overload. Pt received HD on , ,  and . DAPT was resumed, TTE showed improvement in LVEF to 40-45%. Stress test was abnormal with 1mm inferior STD, reversible ischemia in the inferior wall and fixed defects in the lateral, anterior and apical walls with post stress EF of 24%. Pt was then transferred to [facility] for cardiac cath which showed pLAD 70% ISR, mLCx 70%, D1 severe dz. Patient now transferred to [facility] 2024 for CABG.       - underwent  C3L free LIMA-LAD; SVG-Diag; SVG-leftPL   - extubated   - hypotension and ECHO showing Systolic HF/depressed BIV Function, currently supported with /pressors.  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 What was tissue culture from? . await surgical follow up   - yeast in bronch. Pt is on broad spectrum coverage . May need to consider fungal prophylaxis at some point - will continue to monitor   - consider sono Right forearm  - acyclovir  ,dosed for renal insufficiency  - hepatology follow up appreciated   - await colorrectal follow up for ? fluid collection      Marla Champion M.D. ,   please reach via teams   If no answer, or after 5PM/ weekends,  then please call  [phone]    Assessment and plan discussed with the primary team .    I will be away tomorrow. My associates will be covering. Please call [phone] with acute issues, questions.

## 2025-01-28 NOTE — CONSULT NOTE ADULT - ASSESSMENT
55 y/o male with PMHx of hypertension, ESRD on HD, CAD (s/p PCI in 4/2024), chronic systolic heart failure, chronic ascites, recent admission for cholecystitis s/p cholecystectomy who was admitted for acute decompensated heart failure and found to have abnormal nuclear stress. LHC showed multi-vessel coronary disease, for which he underwent a CABG (LIMA - LAD | SVG - D1 | SVG - LPL) on 12/30/24. Post-op course complicated by shock requiring IABP + dobutamine, ESBL E. coli bacteremia, new-onset atrial fibrillation (1/6/25) for which pt underwent EDU/DCCV in CTU on 1/10/25. C/b ESBL pneumonia, collapsed Left Lung, s/p multiple bronch and tracheostomy 1/17, + VRE 1/22 E. Faecium Bcx. EP reconsulted for sinus bradycardia overnight/this AM.     #vagally mediated bradycardia  #CAD s/p CABG 12/30/24  #new onset AF  #respiratory failure s/p trach  #Ecoli bacteremia  #ESRD on HD    - tele notes sinus bradycardia down to ~30-40s. review of tele notes p-p prolongation with bradycardia and intermittent junctional rhythm, vagally mediated. Otherwise SR rates ~70-80s.   - Per patient, he was awake for event at 0748 this AM and asleep during event last night. He indicates he was asymptomatic. On discussion with nurse, pt was asleep during event this AM and had to be woken up.   - Pt has PW however not working per CTU?  - His BP decreased ~90s/50s during event and pt was restarted on Dobutamine for chronotropic support.   - Would trial Theophylline 100mg BID and assess rates.   - No indication for PPM at this time.   - Of note, pt is s/p EDU/DCCV on 1/10, currently not on AC. Additionally, mod pericardial effusion noted on TTE 1/27. Would restart AC when OK per CTSx  - Keep on tele. Keep K>4, Mg>2  - Discussed with EP attending and primary team.

## 2025-01-28 NOTE — PROGRESS NOTE ADULT - SUBJECTIVE AND OBJECTIVE BOX
Patient seen and examined at the bedside.    Remains critically ill on continuous ICU monitoring.      Brief Summary:  55 yo M with multiple medical problems including CAD s/p PCI in 2024 s/p CABG x 3 on 24: Intubated for hypoxia & left lung white out s/p awake bronch with removal of copious mucopurulent secretions  : s/p IABP insertion, sepsis/septic shock due to E coli   ESBL pneumonia, collapsed Left Lung, s/p multiple bronch    tracheostomy tube placement    VRE E. Faecium Bcx   +HSV PCR BAL    24 Hour events:  Tolerating iHD  Remains on   HFTC intermittently for 12 hour increments     Objective:  Vital Signs Last 24 Hrs  T(C): 36.9 (2025 04:00), Max: 37.2 (2025 00:00)  T(F): 98.5 (2025 04:00), Max: 98.9 (2025 00:00)  HR: 68 (2025 06:00) (54 - 93)  BP: 137/70 (2025 06:00) (65/31 - 169/77)  BP(mean): 97 (2025 06:00) (41 - 112)  RR: 22 (2025 06:00) (15 - 36)  SpO2: 100% (2025 06:00) (90% - 100%)    Parameters below as of 2025 05:38  Patient On (Oxygen Delivery Method): ventilator    Mode: AC/ CMV (Assist Control/ Continuous Mandatory Ventilation)  RR (machine): 22  FiO2: 40  PEEP: 5  ITime: 1  MAP: 9  PC: 12  PIP: 17    Physical Exam:  General: Alert and interactive,   Neurology: Oriented, following commands. ambulates  Respiratory: Breath sounds bilaterally, trach collar  CV: Sinus  Abdominal: Soft, Nontender  Extremities: Warm, well-perfused  Right arm tender, but stable  -------------------------------------------------------------------------------------------------------------------------------    Labs:                        8.7    8.40  )-----------( 322      ( 2025 00:24 )             28.0         134[L]  |  95[L]  |  27[H]  ----------------------------<  111[H]  4.2   |  27  |  4.57[H]    Ca    8.3[L]      2025 00:24  Phos  3.9       Mg     2.1         TPro  5.5[L]  /  Alb  2.6[L]  /  TBili  0.4  /  DBili  x   /  AST  19  /  ALT  12  /  AlkPhos  116      LIVER FUNCTIONS - ( 2025 00:24 )  Alb: 2.6 g/dL / Pro: 5.5 g/dL / ALK PHOS: 116 U/L / ALT: 12 U/L / AST: 19 U/L / GGT: x         ------------------------------------------------------------------------------------------------------------------------------  Assessment:  55 yo M s/p CABG x 3 on 24    Postop acute respiratory failure  Acute blood loss anemia  Ascites    ESRD on iHD   E coli bacteremia 2/2 pneumonia  VRE E. Faecium   HSV infection  Tracheomalacia     Plan:  ***Neuro***  Postoperative acute pain control with Gabapentin and prns.  Gabapentin, Robaxin, Tylenol, Oxy prn for pain  PT ongoing    ***Cardiovascular***  s/p CABG x 3  A fib s/p cardioversion  Dobutamine for chronotropy  TTE yesterday  ASA / Statin daily    ***Pulmonary***  Postoperative acute respiratory failure  Tracheostomy    s/p Bronchoscopy . , ,   Secretion in Left lung improving  Restart Mucomyst albuterol  Left lung PNA, + e coli  Tolerates trach collar, keep on a vent at night  On HT 3% nebs and albuterol    ***GI***  On Tube feeds at goal, change to vital per nutrition consult  Protonix for stress ulcer prophylaxis.   Ascites: Last paracentesis   Upper quadrant ultrasound to assess ascites    ***Renal***  ESRD - iHD yesterday, plan for iHD tomorrow - (MWF)  Nephro-Lisette for renal support    ***ID***   E. coli PNA -  Meropenem. inhaled mary completed  New abscess in right buttocks and coccygeal area  Meropenem re-started, amikacin single dose  Consult surgery for possible I &D  VRE bacteremia,  on Linezolid - repeat cultures   Monitor cultures   Acyclovir for + HSV in BAL    ***Endocrine***  Hyperglycemia - Insulin sliding scale    ***Hematology***  Acute blood loss anemia  CBC, coags  Continue  Epogen.  Heparin SQ for DVT  Needs full AC s/p cardioversion    *** Lines ***  RUE Midline        Care plan discussed with the ICU care team.   Patient remains critical, at risk for life threatening decompensation.    I have spent 30 minutes providing critical care management to this patient.    By signing my name below, I, Baldemar Hernandez, attest that this documentation has been prepared under the direction and in the presence of Blaze Finch MD.  Electronically signed: Evelio Sevilla 25 @ 06:55    I, Blaze Finch MD,  personally performed the services described in this documentation. All medical record entries made by the scribe were at my direction and in my presence. I have reviewed the chart and agree that the record reflects my personal performance and is accurate and complete  Electronically signed: Blaze Finch MD. Patient seen and examined at the bedside.    Remains critically ill on continuous ICU monitoring.      Brief Summary:  53 yo M with multiple medical problems including CAD s/p PCI in April 2024 s/p CABG x 3 on 12/30/24 1/2: Intubated for hypoxia & left lung white out s/p awake bronch with removal of copious mucopurulent secretions  1/4: s/p IABP insertion, sepsis/septic shock due to E coli   ESBL pneumonia, collapsed Left Lung, s/p multiple bronch   1/18 tracheostomy tube placement   1/22 VRE E. Faecium Bcx  1/24 +HSV PCR BAL    24 Hour events:  Tolerating iHD -1L yesterday  HFTC intermittently for 12 hour increments   Dobutamine restarted for symptomatic bradycardia/junctional and then stopped early morning   TTE yesterday with normal function, ,mod pericardial effusion   1/24 BAL VRE    Objective:  Vital Signs Last 24 Hrs  T(C): 36.9 (28 Jan 2025 04:00), Max: 37.2 (28 Jan 2025 00:00)  T(F): 98.5 (28 Jan 2025 04:00), Max: 98.9 (28 Jan 2025 00:00)  HR: 68 (28 Jan 2025 06:00) (54 - 93)  BP: 137/70 (28 Jan 2025 06:00) (65/31 - 169/77)  BP(mean): 97 (28 Jan 2025 06:00) (41 - 112)  RR: 22 (28 Jan 2025 06:00) (15 - 36)  SpO2: 100% (28 Jan 2025 06:00) (90% - 100%)    Parameters below as of 28 Jan 2025 05:38  Patient On (Oxygen Delivery Method): ventilator    Mode: AC/ CMV (Assist Control/ Continuous Mandatory Ventilation)  RR (machine): 22  FiO2: 40  PEEP: 5  ITime: 1  MAP: 9  PC: 12  PIP: 17    Physical Exam:  General: Alert and interactive,   Neurology: Oriented, following commands. ambulates  Respiratory: Breath sounds bilaterally, trach collar  CV: Sinus  Abdominal: Soft, Nontender  Extremities: Warm, well-perfused  Right arm tender, but stable  -------------------------------------------------------------------------------------------------------------------------------    Labs:                        8.7    8.40  )-----------( 322      ( 28 Jan 2025 00:24 )             28.0     01-28    134[L]  |  95[L]  |  27[H]  ----------------------------<  111[H]  4.2   |  27  |  4.57[H]    Ca    8.3[L]      28 Jan 2025 00:24  Phos  3.9     01-28  Mg     2.1     01-28    TPro  5.5[L]  /  Alb  2.6[L]  /  TBili  0.4  /  DBili  x   /  AST  19  /  ALT  12  /  AlkPhos  116  01-28    LIVER FUNCTIONS - ( 28 Jan 2025 00:24 )  Alb: 2.6 g/dL / Pro: 5.5 g/dL / ALK PHOS: 116 U/L / ALT: 12 U/L / AST: 19 U/L / GGT: x         ------------------------------------------------------------------------------------------------------------------------------  Assessment:  53 yo M s/p CABG x 3 on 12/30/24    Postop acute respiratory failure  Acute blood loss anemia  Ascites    ESRD on iHD   E coli bacteremia 2/2 pneumonia  VRE E. Faecium   HSV infection  Tracheomalacia     Plan:  ***Neuro***  Postoperative acute pain control with Gabapentin and prns.  Gabapentin, Robaxin, Tylenol, Oxy prn for pain  PT ongoing    ***Cardiovascular***  s/p CABG x 3  A fib s/p cardioversion  intermittent bradycardia/junction with hypotension - EP eval   TTE 1/28 LVEF 60%, mod pericardial effusion   ASA / Statin daily    ***Pulmonary***  Postoperative acute respiratory failure  Tracheostomy 1/18   s/p Bronchoscopy 1/18. 1/20, 1/21, 1/24  Secretion in Left lung improving  Restart Mucomyst albuterol  Left lung PNA, + e coli ESBL - cleared but now with VRE   Tolerates trach collar, keep on a vent at night  On HT 3% nebs and albuterol    ***GI***  On Tube feeds at goal, change to vital per nutrition consult  Protonix for stress ulcer prophylaxis.   Ascites: Last paracentesis 1/11    ***Renal***  ESRD - iHD yesterday -1L, plan for iHD tomorrow - (MWF)  Nephro-Lisette for renal support    ***ID***  E. coli PNA -  Meropenem. inhaled tobramycin completed  Meropenem re-started, amikacin single dose  VRE bacteremia, on Linezolid - repeat cultures   Monitor cultures  1/24 VRE BAL  1/24 Acyclovir for + HSV in BAL  1/26 serratia on tissue cx     ***Endocrine***  Hyperglycemia - Insulin sliding scale    ***Hematology***  Acute blood loss anemia  CBC, coags  Continue  Epogen.  Heparin SQ for DVT  Needs full AC s/p cardioversion    *** Lines ***  RUE Midline        Care plan discussed with the ICU care team.   Patient remains critical, at risk for life threatening decompensation.    I have spent 40minutes providing critical care management to this patient.    By signing my name below, I, Baldemar Hernandez, attest that this documentation has been prepared under the direction and in the presence of Blaze Finch MD.  Electronically signed: Evelio Sevilla 01-28-25 @ 06:55    I, Blaze Finch MD,  personally performed the services described in this documentation. All medical record entries made by the scribe were at my direction and in my presence. I have reviewed the chart and agree that the record reflects my personal performance and is accurate and complete  Electronically signed: Blaze Finch MD.

## 2025-01-28 NOTE — PROGRESS NOTE ADULT - SUBJECTIVE AND OBJECTIVE BOX
Patient seen and examined at the bedside.    Remained critically ill on continuous ICU monitoring.    OBJECTIVE:  Vital Signs Last 24 Hrs  T(C): 36.8 (28 Jan 2025 20:00), Max: 37.3 (28 Jan 2025 12:00)  T(F): 98.2 (28 Jan 2025 20:00), Max: 99.1 (28 Jan 2025 12:00)  HR: 62 (28 Jan 2025 21:00) (49 - 93)  BP: 139/66 (28 Jan 2025 21:00) (65/31 - 178/81)  BP(mean): 95 (28 Jan 2025 21:00) (41 - 117)  RR: 22 (28 Jan 2025 21:00) (12 - 45)  SpO2: 100% (28 Jan 2025 21:00) (94% - 100%)    Parameters below as of 28 Jan 2025 20:00  Patient On (Oxygen Delivery Method): HFTC  O2 Flow (L/min): 40  O2 Concentration (%): 40      Physical Exam:   General: Alert and interactive  Neurology: Oriented, following commands. ambulates  Eyes: bilateral pupils equal and reactive   ENT/Neck: Neck supple, trachea midline, No JVD   Respiratory: Clear bilaterally   CV: S1S2, no murmurs        [x] Sinus rhythm, [x] Temporary pacing - standby mode  Abdominal: Soft, NT, ND +BS   Extremities: 1-2+ pedal edema noted, + peripheral pulses   Skin: No Rashes, Hematoma, Ecchymosis                         Assessment:  55 yo M s/p CABG x 3 on 12/30/24    Postop acute respiratory failure  Acute blood loss anemia  Ascites    ESRD on iHD   E coli bacteremia 2/2 pneumonia  VRE E. Faecium   HSV infection  Tracheomalacia     Plan:   ***Neuro***  [x] Nonfocal  Post operative neuro assessment   Gabapentin, Robaxin, Lido patches & PRNs for pain management  Nightly Trazodone, Remeron, Melatonin   OOBTC and walk as tolerated    ***Cardiovascular***  Invasive hemodynamic monitoring, assess perfusion indices   SR / Hct 28.0% / Lactate 1.0  Continuos reassessment of hemodynamics  [x] DVT ppx w/ SQ Heparin   [x]  ASA [x] Statin  Serial EKG and cardiac enzymes     ***Pulmonary***  [x] HFTC - 40L/40%  Titration of FiO2 and PEEP, follow SpO2, CXR, blood gasses     Mode: Vent off  FiO2: 40            ***GI***  [x] TF's at goal of 60 ccs  [x] Protonix for stress ulcer prophylaxis  Bowel regimen with Miralax     ***Renal***  [x] ESRD on HD   Continue to monitor I/Os, BUN/Creatinine.   Replete lytes PRN  Nephro-Lisette & EPOGEN for renal support    ***ID***  Sepsis/septic shock due to ESBL E. coli - Meropenem   Zyvox started for VRE bacteremia   Acyclovir for + HSV in BAL    ***Endocrine***  [x]  DM2 : HbA1c 5.6%                - [x] ISS              - Need tight glycemic control to prevent wound infection.    Patient requires continuous monitoring with bedside rhythm monitoring, pulse oximetry monitoring, and continuous central venous and arterial pressure monitoring; and intermittent blood gas analysis. Care plan discussed with the ICU care team.   Patient remained critical, at risk for life threatening decompensation.    I have spent 65 minutes providing critical care management to this patient.    By signing my name below, I, Gillian Taylor, attest that this documentation has been prepared under the direction and in the presence of Judith Marie NP  Electronically signed: Autunm Dickens, 01-28-25 @ 21:17    I, Judith Marie NP, personally performed the services described in this documentation. all medical record entries made by the autumn were at my direction and in my presence. I have reviewed the chart and agree that the record reflects my personal performance and is accurate and complete  Electronically signed: Judith Marie NP

## 2025-01-28 NOTE — PROCEDURE NOTE - NSBRONCHPROCDETAILS_GEN_A_CORE_FT
Indication: Left loweer Lobe collapse      Preop medication: diprivan    Xray Findings:    Findings:  Bronchoscope inserted through Tracheostomy tube. Trach noted to be in good position. Airway evaluation revealed Sharp Abena. AUGUSTO and LLL evaluation revealed small  amount of secretions lavaged with normal saline & suctioned multiple times RUL, RML, RLL revealed  small amount of secretions.. Bronchoscope then withdrawn from tracheostomy tube..  No  bleeding noted    Specimens: BAL sent

## 2025-01-28 NOTE — PROGRESS NOTE ADULT - SUBJECTIVE AND OBJECTIVE BOX
Patient is a 54y old  Male who presents with a chief complaint of MI (24 Jan 2025 10:30)    Being followed by ID for        Interval history:  No other acute events      ROS:  No cough,SOB,CP  No N/V/D  No abd pain  No urinary complaints  No HA  No joint or limb pain  No other complaints    PAST MEDICAL & SURGICAL HISTORY:  Type 2 diabetes mellitus      Hypertension      End stage renal disease  started HD 2/2019 T, Th, Sat via right chest permacath      Bone spur  right shoulder- hx of - sx done      Anemia      Injury of right wrist  hx of at age 14      History of hyperkalemia  before HD- K-6.2 - to repeat in am of sx, pt had HD today      S/P arthroscopy of right shoulder  2010      History of vascular access device  right chest permacath - 2/2019      H/O right wrist surgery  tendons repair - at age 14      AV fistula      H/O ventral hernia repair      S/P cholecystectomy        Allergies    No Known Allergies    Intolerances      Antimicrobials:    acyclovir IVPB 430 milliGRAM(s) IV Intermittent <User Schedule>  linezolid  IVPB      linezolid  IVPB 600 milliGRAM(s) IV Intermittent every 12 hours  meropenem  IVPB 500 milliGRAM(s) IV Intermittent every 24 hours  meropenem  IVPB        MEDICATIONS  (STANDING):  acetylcysteine 10%  Inhalation 4 milliLiter(s) Inhalation every 6 hours  acyclovir IVPB 430 milliGRAM(s) IV Intermittent <User Schedule>  albuterol    0.083% 2.5 milliGRAM(s) Nebulizer every 6 hours  ascorbic acid 500 milliGRAM(s) Oral daily  aspirin  chewable 81 milliGRAM(s) Oral daily  atorvastatin 80 milliGRAM(s) Oral at bedtime  bisacodyl Suppository 10 milliGRAM(s) Rectal once  chlorhexidine 0.12% Liquid 15 milliLiter(s) Oral Mucosa every 12 hours  chlorhexidine 2% Cloths 1 Application(s) Topical daily  chlorhexidine 4% Liquid 1 Application(s) Topical <User Schedule>  dextrose 50% Injectable 50 milliLiter(s) IV Push every 15 minutes  DOBUTamine Infusion 2 MICROgram(s)/kG/Min (5.11 mL/Hr) IV Continuous <Continuous>  epoetin ignacia (EPOGEN) Injectable 62888 Unit(s) IV Push <User Schedule>  gabapentin 100 milliGRAM(s) Oral every 12 hours  heparin   Injectable 5000 Unit(s) SubCutaneous every 8 hours  insulin lispro (ADMELOG) corrective regimen sliding scale   SubCutaneous every 6 hours  lidocaine   4% Patch 1 Patch Transdermal daily  linezolid  IVPB      linezolid  IVPB 600 milliGRAM(s) IV Intermittent every 12 hours  melatonin 5 milliGRAM(s) Oral at bedtime  meropenem  IVPB 500 milliGRAM(s) IV Intermittent every 24 hours  meropenem  IVPB      methocarbamol 500 milliGRAM(s) Oral every 8 hours  mirtazapine 7.5 milliGRAM(s) Oral daily  Nephro-elicia 1 Tablet(s) Oral daily  pantoprazole  Injectable 40 milliGRAM(s) IV Push daily  polyethylene glycol 3350 17 Gram(s) Oral two times a day  sodium chloride 0.65% Nasal 1 Spray(s) Both Nostrils every 12 hours  sodium chloride 0.9%. 1000 milliLiter(s) (10 mL/Hr) IV Continuous <Continuous>  traZODone 50 milliGRAM(s) Oral at bedtime      Vital Signs Last 24 Hrs  T(C): 37.1 (01-28-25 @ 08:00), Max: 37.2 (01-28-25 @ 00:00)  T(F): 98.7 (01-28-25 @ 08:00), Max: 98.9 (01-28-25 @ 00:00)  HR: 72 (01-28-25 @ 08:40) (49 - 93)  BP: 129/67 (01-28-25 @ 08:00) (65/31 - 169/77)  BP(mean): 92 (01-28-25 @ 08:00) (41 - 112)  RR: 22 (01-28-25 @ 08:00) (15 - 30)  SpO2: 100% (01-28-25 @ 08:40) (90% - 100%)    Physical Exam:    Constitutional well preserved,comfortable,pleasant    HEENT PERRLA EOMI,No pallor or icterus    No oral exudate or erythema    Neck supple no JVD or LN    Chest Good AE,CTA    CVS RRR S1 S2 WNl No murmur or rub or gallop    Abd soft BS normal No tenderness no masses    Ext No cyanosis clubbing or edema    IV site no erythema tenderness or discharge    Joints no swelling or LOM    CNS AAO X 3 no focal    Lab Data:                          8.7    8.40  )-----------( 322      ( 28 Jan 2025 00:24 )             28.0       01-28    134[L]  |  95[L]  |  27[H]  ----------------------------<  111[H]  4.2   |  27  |  4.57[H]    Ca    8.3[L]      28 Jan 2025 00:24  Phos  3.9     01-28  Mg     2.1     01-28    TPro  5.5[L]  /  Alb  2.6[L]  /  TBili  0.4  /  DBili  x   /  AST  19  /  ALT  12  /  AlkPhos  116  01-28      Urinalysis Basic - ( 28 Jan 2025 00:24 )    Color: x / Appearance: x / SG: x / pH: x  Gluc: 111 mg/dL / Ketone: x  / Bili: x / Urobili: x   Blood: x / Protein: x / Nitrite: x   Leuk Esterase: x / RBC: x / WBC x   Sq Epi: x / Non Sq Epi: x / Bacteria: x        Tissue  01-26-25   Rare Serratia marcescens  --    Few polymorphonuclear leukocytes per low power field  No organisms seen per oil power field      Bronchial  01-24-25   Culture is being performed.  --  Enterococcus faecium (vancomycin resistant)      .Blood BLOOD  01-23-25   No growth at 4 days  --  --      .Blood BLOOD  01-22-25   Growth in anaerobic bottle: Enterococcus faecium (vancomycin resistant)  Direct identification is available within approximately 3-5  hours either by Blood Panel Multiplexed PCR or Direct  MALDI-TOF. Details: [email or web address]  --  Blood Culture PCR  Enterococcus faecium (vancomycin resistant)                    WBC Count: 8.40 (01-28-25 @ 00:24)  WBC Count: 10.28 (01-27-25 @ 00:54)  WBC Count: 11.39 (01-26-25 @ 00:27)  WBC Count: 9.97 (01-25-25 @ 00:32)  WBC Count: 10.07 (01-24-25 @ 00:26)  WBC Count: 9.33 (01-23-25 @ 00:33)  WBC Count: 9.24 (01-22-25 @ 00:11)       Bilirubin Total: 0.4 mg/dL (01-28-25 @ 00:24)  Aspartate Aminotransferase (AST/SGOT): 19 U/L (01-28-25 @ 00:24)  Alanine Aminotransferase (ALT/SGPT): 12 U/L (01-28-25 @ 00:24)  Alkaline Phosphatase: 116 U/L (01-28-25 @ 00:24)      < from: CT Chest No Cont (01.26.25 @ 14:48) >    ACC: [id number] EXAM:  CT CHEST   ORDERED BY: CAROLINE CALL     PROCEDURE DATE:  01/26/2025          INTERPRETATION:  CLINICAL INFORMATION: Follow-up loculated left pleural   effusion.    COMPARISON: CT chest 1/23/2025    CONTRAST/COMPLICATIONS:  IV Contrast: NONE  Oral Contrast: NONE  .    PROCEDURE:  CT of the Chest was performed.  Sagittal and coronal reformats were performed.    FINDINGS:    LUNGS AND LARGE AIRWAYS: Tracheostomy in place. Tracheal secretions.   Mosaic attenuation, likely related to air trapping. Partial consolidation   of the left upper and lower lobes lobe, decreased since prior exam.  PLEURA: Increased small right pleural effusion. Decreased partially   loculated small left pleural effusion.  VESSELS: Aortic calcifications. Coronary artery calcifications.  HEART: Cardiomegaly. Stable small to moderate pericardial effusion.   Epicardial lead.  MEDIASTINUM AND CASSANDRA: Prominent mediastinal and supraclavicular lymph   nodes are not significantly changed.  CHEST WALL AND LOWER NECK: Bilateral gynecomastia. Diffuse subcutaneous   edema.  VISUALIZED UPPER ABDOMEN: Enteric tube with tip in the distal stomach.   Cholecystectomy. Bilateral renal atrophy. Moderate to large volume   ascites.  BONES: Degenerative changes. Median sternotomy.    IMPRESSION:  Decreased consolidation of the left upper and lower lobes.  Decreased partially loculated small left pleural effusion.  Increased small right pleural effusion.  Stable small to moderate pericardial effusion.        --- End of Report ---           BINH TAMEZ MD; Resident Radiologist  This document has been electronically signed.  GIULIA GRANT MD; Attending Radiologist  This document has been electronically signed. Jan 26 2025  5:08PM    < end of copied text >   Patient is a 54y old  Male who presents with a chief complaint of MI (24 Jan 2025 10:30)    Being followed by ID for        Interval history:  pt feels that arm is a little better  pt awake watching television  No other acute events          PAST MEDICAL & SURGICAL HISTORY:  Type 2 diabetes mellitus      Hypertension      End stage renal disease  started HD 2/2019 T, Th, Sat via right chest permacath      Bone spur  right shoulder- hx of - sx done      Anemia      Injury of right wrist  hx of at age 14      History of hyperkalemia  before HD- K-6.2 - to repeat in am of sx, pt had HD today      S/P arthroscopy of right shoulder  2010      History of vascular access device  right chest permacath - 2/2019      H/O right wrist surgery  tendons repair - at age 14      AV fistula      H/O ventral hernia repair      S/P cholecystectomy        Allergies    No Known Allergies    Intolerances      Antimicrobials:    acyclovir IVPB 430 milliGRAM(s) IV Intermittent <User Schedule>  linezolid  IVPB      linezolid  IVPB 600 milliGRAM(s) IV Intermittent every 12 hours  meropenem  IVPB 500 milliGRAM(s) IV Intermittent every 24 hours  meropenem  IVPB        MEDICATIONS  (STANDING):  acetylcysteine 10%  Inhalation 4 milliLiter(s) Inhalation every 6 hours  acyclovir IVPB 430 milliGRAM(s) IV Intermittent <User Schedule>  albuterol    0.083% 2.5 milliGRAM(s) Nebulizer every 6 hours  ascorbic acid 500 milliGRAM(s) Oral daily  aspirin  chewable 81 milliGRAM(s) Oral daily  atorvastatin 80 milliGRAM(s) Oral at bedtime  bisacodyl Suppository 10 milliGRAM(s) Rectal once  chlorhexidine 0.12% Liquid 15 milliLiter(s) Oral Mucosa every 12 hours  chlorhexidine 2% Cloths 1 Application(s) Topical daily  chlorhexidine 4% Liquid 1 Application(s) Topical <User Schedule>  dextrose 50% Injectable 50 milliLiter(s) IV Push every 15 minutes  DOBUTamine Infusion 2 MICROgram(s)/kG/Min (5.11 mL/Hr) IV Continuous <Continuous>  epoetin ignacia (EPOGEN) Injectable 33043 Unit(s) IV Push <User Schedule>  gabapentin 100 milliGRAM(s) Oral every 12 hours  heparin   Injectable 5000 Unit(s) SubCutaneous every 8 hours  insulin lispro (ADMELOG) corrective regimen sliding scale   SubCutaneous every 6 hours  lidocaine   4% Patch 1 Patch Transdermal daily  linezolid  IVPB      linezolid  IVPB 600 milliGRAM(s) IV Intermittent every 12 hours  melatonin 5 milliGRAM(s) Oral at bedtime  meropenem  IVPB 500 milliGRAM(s) IV Intermittent every 24 hours  meropenem  IVPB      methocarbamol 500 milliGRAM(s) Oral every 8 hours  mirtazapine 7.5 milliGRAM(s) Oral daily  Nephro-elicia 1 Tablet(s) Oral daily  pantoprazole  Injectable 40 milliGRAM(s) IV Push daily  polyethylene glycol 3350 17 Gram(s) Oral two times a day  sodium chloride 0.65% Nasal 1 Spray(s) Both Nostrils every 12 hours  sodium chloride 0.9%. 1000 milliLiter(s) (10 mL/Hr) IV Continuous <Continuous>  traZODone 50 milliGRAM(s) Oral at bedtime      Vital Signs Last 24 Hrs  T(C): 37.1 (01-28-25 @ 08:00), Max: 37.2 (01-28-25 @ 00:00)  T(F): 98.7 (01-28-25 @ 08:00), Max: 98.9 (01-28-25 @ 00:00)  HR: 72 (01-28-25 @ 08:40) (49 - 93)  BP: 129/67 (01-28-25 @ 08:00) (65/31 - 169/77)  BP(mean): 92 (01-28-25 @ 08:00) (41 - 112)  RR: 22 (01-28-25 @ 08:00) (15 - 30)  SpO2: 100% (01-28-25 @ 08:40) (90% - 100%)    Physical Exam:    Constitutional well preserved,comfortable,pleasant    HEENT PERRLA EOMI,No pallor or icterus    No oral exudate or erythema    Neck trach    Chest Good AE,CTA    CVS  S1 S2     Abd soft BS normal No tenderness     Ext  right elbow lesion dressed, right arm less sensitive     IV site no erythema tenderness or discharge    Joints no swelling or LOM    CNS AAO X 3 no focal    Lab Data:                          8.7    8.40  )-----------( 322      ( 28 Jan 2025 00:24 )             28.0       01-28    134[L]  |  95[L]  |  27[H]  ----------------------------<  111[H]  4.2   |  27  |  4.57[H]    Ca    8.3[L]      28 Jan 2025 00:24  Phos  3.9     01-28  Mg     2.1     01-28    TPro  5.5[L]  /  Alb  2.6[L]  /  TBili  0.4  /  DBili  x   /  AST  19  /  ALT  12  /  AlkPhos  116  01-28          Tissue  01-26-25   Rare Serratia marcescens  --    Few polymorphonuclear leukocytes per low power field  No organisms seen per oil power field      Bronchial  01-24-25   Culture is being performed.  --  Enterococcus faecium (vancomycin resistant)    Culture - Quantitative BAL Immunocompromised (01.24.25 @ 14:12)    Gram Stain:   No polymorphonuclear leukocytes seen  No organisms seen  by cytocentrifuge   -  Vancomycin: R >16   -  Rifampin: R >2   -  Penicillin: >2   -  Linezolid: S <=1   -  Levofloxacin: R >4   -  Erythromycin: R >4   -  Ciprofloxacin: R >2   -  Ampicillin: R >8 Predicts results to ampicillin/sulbactam, amoxacillin-clavulanate and  piperacillin-tazobactam.   Specimen Source: Bronchial   Culture Results:   Numerous Enterococcus faecium (vancomycin resistant)   Organism Identification: Enterococcus faecium (vancomycin resistant)   Organism: Enterococcus faecium (vancomycin resistant)   Method Type: LISA    Culture - Blood (01.22.25 @ 12:20)    -  Gentamicin synergy: S <=500   -  Streptomycin synergy: S <=1000   -  Enterococcus faecium,VRE: Detec   Gram Stain:   Growth in anaerobic bottle: Gram Positive Cocci in Pairs and Chains   -  Vancomycin: R >16   -  Linezolid: S 2   -  Daptomycin: R >4   -  Ampicillin: R >8 Predicts results to ampicillin/sulbactam, amoxacillin-clavulanate and  piperacillin-tazobactam.   Specimen Source: .Blood BLOOD   Organism: Blood Culture PCR   Organism: Enterococcus faecium (vancomycin resistant)   Culture Results:   Growth in anaerobic bottle: Enterococcus faecium (vancomycin resistant)  Direct identification is available within approximately 3-5  hours either by Blood Panel Multiplexed PCR or Direct  MALDI-TOF. Details: [email or web address]   Organism Identification: Blood Culture PCR  Enterococcus faecium (vancomycin resistant)   Method Type: PCR   Method Type: LISA      .Blood BLOOD  01-23-25   No growth at 4 days  --  --      .Blood BLOOD  01-22-25   Growth in anaerobic bottle: Enterococcus faecium (vancomycin resistant)  Direct identification is available within approximately 3-5  hours either by Blood Panel Multiplexed PCR or Direct  MALDI-TOF. Details: [email or web address]  --  Blood Culture PCR  Enterococcus faecium (vancomycin resistant)      Culture - Tissue with Gram Stain (01.26.25 @ 16:55)    Gram Stain:   Few polymorphonuclear leukocytes per low power field  No organisms seen per oil power field   -  Amoxicillin/Clavulanic Acid: R >16/8   -  Ampicillin: R >16 These ampicillin results predict results for amoxicillin   -  Ampicillin/Sulbactam: R >16/8   -  Aztreonam: S <=4   -  Cefazolin: R >16   -  Cefepime: S <=2   -  Cefoxitin: R >16   -  Ceftriaxone: S <=1   -  Ciprofloxacin: S <=0.25   -  Ertapenem: S <=0.5   -  Gentamicin: I 4   -  Levofloxacin: S <=0.5   -  Meropenem: S <=1   -  Piperacillin/Tazobactam: S <=8   -  Tobramycin: R >8   -  Trimethoprim/Sulfamethoxazole: S <=0.5/9.5   Specimen Source: Tissue   Culture Results:   Rare Serratia marcescens   Organism Identification: Serratia marcescens   Organism: Serratia marcescens   Method Type: LISA        WBC Count: 8.40 (01-28-25 @ 00:24)  WBC Count: 10.28 (01-27-25 @ 00:54)  WBC Count: 11.39 (01-26-25 @ 00:27)  WBC Count: 9.97 (01-25-25 @ 00:32)  WBC Count: 10.07 (01-24-25 @ 00:26)  WBC Count: 9.33 (01-23-25 @ 00:33)  WBC Count: 9.24 (01-22-25 @ 00:11)       Bilirubin Total: 0.4 mg/dL (01-28-25 @ 00:24)  Aspartate Aminotransferase (AST/SGOT): 19 U/L (01-28-25 @ 00:24)  Alanine Aminotransferase (ALT/SGPT): 12 U/L (01-28-25 @ 00:24)  Alkaline Phosphatase: 116 U/L (01-28-25 @ 00:24)      < from: CT Chest No Cont (01.26.25 @ 14:48) >    ACC: [id number] EXAM:  CT CHEST   ORDERED BY: CAROLINE CHILDMYA     PROCEDURE DATE:  01/26/2025          INTERPRETATION:  CLINICAL INFORMATION: Follow-up loculated left pleural   effusion.    COMPARISON: CT chest 1/23/2025    CONTRAST/COMPLICATIONS:  IV Contrast: NONE  Oral Contrast: NONE  .    PROCEDURE:  CT of the Chest was performed.  Sagittal and coronal reformats were performed.    FINDINGS:    LUNGS AND LARGE AIRWAYS: Tracheostomy in place. Tracheal secretions.   Mosaic attenuation, likely related to air trapping. Partial consolidation   of the left upper and lower lobes lobe, decreased since prior exam.  PLEURA: Increased small right pleural effusion. Decreased partially   loculated small left pleural effusion.  VESSELS: Aortic calcifications. Coronary artery calcifications.  HEART: Cardiomegaly. Stable small to moderate pericardial effusion.   Epicardial lead.  MEDIASTINUM AND CASSANDRA: Prominent mediastinal and supraclavicular lymph   nodes are not significantly changed.  CHEST WALL AND LOWER NECK: Bilateral gynecomastia. Diffuse subcutaneous   edema.  VISUALIZED UPPER ABDOMEN: Enteric tube with tip in the distal stomach.   Cholecystectomy. Bilateral renal atrophy. Moderate to large volume   ascites.  BONES: Degenerative changes. Median sternotomy.    IMPRESSION:  Decreased consolidation of the left upper and lower lobes.  Decreased partially loculated small left pleural effusion.  Increased small right pleural effusion.  Stable small to moderate pericardial effusion.        --- End of Report ---           BINH TAMEZ MD; Resident Radiologist  This document has been electronically signed.  GIULIA GRANT MD; Attending Radiologist  This document has been electronically signed. Jan 26 2025  5:08PM    < end of copied text >

## 2025-01-28 NOTE — PROGRESS NOTE ADULT - ASSESSMENT
54M with PMH of HTN, HLD, ESRD on HD MWF via LUE AVF, ascites (requiring repeat paracenteses q4-6wks), NICM with moderate LV dysfunction w/ EF 35-40%(2023) and CAD s/p atherectomy + SALLIE to D1(4/11/2024) presents to [facility] transferred from [facility] for CABG eval  Nephro on [facility] for HD    ESRD on HD   Regular schedule MWF   Access LUE AVF  Center [facility]  Nephrologist Dr. Bailey  Last HD on 12/24  S/p HD on 12/27 12/29, 12/30 for hyperkalemia and 12/31  2 L PUF On 01/02/2024  However hospital course now complicated by Cardiogenic shock now on multiple pressors,   CRRT initiated 01/03, off since 01/08   S/p PUF on 01/09   HD held on 01/10 due to instability,  S/p HD on 01/17, 1/20, 1/22, 1/24  s/p HD 1/27 uf 1L  HD tomorrow  Keep MAP>65  Renal Diet  Consent in physical chart    Hyper/Hypokalemia  Monitor K, will change dialysate fluid as needed    HTN  currently on pressure support  monitor bp     CKD MBD  Can send a PTH  Ca and Phos daily    Anemia  CRISTIANE with HD  Transfuse if hgb <7    CAD with multivessel disease  s/p CABG 12/30  CTICU following    Hypoxic Resp failure requiring Trach  Per surgery  S/p bronchoscopy, transplant pulm following

## 2025-01-29 LAB
ALBUMIN SERPL ELPH-MCNC: 2.6 G/DL — LOW (ref 3.3–5)
ALP SERPL-CCNC: 118 U/L — SIGNIFICANT CHANGE UP (ref 40–120)
ALT FLD-CCNC: 11 U/L — SIGNIFICANT CHANGE UP (ref 10–45)
ANION GAP SERPL CALC-SCNC: 14 MMOL/L — SIGNIFICANT CHANGE UP (ref 5–17)
AST SERPL-CCNC: 20 U/L — SIGNIFICANT CHANGE UP (ref 10–40)
BASOPHILS # BLD AUTO: 0.05 K/UL — SIGNIFICANT CHANGE UP (ref 0–0.2)
BASOPHILS NFR BLD AUTO: 0.7 % — SIGNIFICANT CHANGE UP (ref 0–2)
BILIRUB SERPL-MCNC: 0.4 MG/DL — SIGNIFICANT CHANGE UP (ref 0.2–1.2)
BUN SERPL-MCNC: 32 MG/DL — HIGH (ref 7–23)
CALCIUM SERPL-MCNC: 8.5 MG/DL — SIGNIFICANT CHANGE UP (ref 8.4–10.5)
CHLORIDE SERPL-SCNC: 92 MMOL/L — LOW (ref 96–108)
CO2 SERPL-SCNC: 26 MMOL/L — SIGNIFICANT CHANGE UP (ref 22–31)
CREAT SERPL-MCNC: 5.51 MG/DL — HIGH (ref 0.5–1.3)
EGFR: 11 ML/MIN/1.73M2 — LOW
EGFR: 11 ML/MIN/1.73M2 — LOW
EOSINOPHIL # BLD AUTO: 0.61 K/UL — HIGH (ref 0–0.5)
EOSINOPHIL NFR BLD AUTO: 8.3 % — HIGH (ref 0–6)
GRAM STN FLD: ABNORMAL
HCT VFR BLD CALC: 29.5 % — LOW (ref 39–50)
HGB BLD-MCNC: 9.3 G/DL — LOW (ref 13–17)
IMM GRANULOCYTES NFR BLD AUTO: 0.3 % — SIGNIFICANT CHANGE UP (ref 0–0.9)
LYMPHOCYTES # BLD AUTO: 0.6 K/UL — LOW (ref 1–3.3)
LYMPHOCYTES # BLD AUTO: 8.1 % — LOW (ref 13–44)
MAGNESIUM SERPL-MCNC: 2.4 MG/DL — SIGNIFICANT CHANGE UP (ref 1.6–2.6)
MCHC RBC-ENTMCNC: 29.7 PG — SIGNIFICANT CHANGE UP (ref 27–34)
MCHC RBC-ENTMCNC: 31.5 G/DL — LOW (ref 32–36)
MCV RBC AUTO: 94.2 FL — SIGNIFICANT CHANGE UP (ref 80–100)
MONOCYTES # BLD AUTO: 0.92 K/UL — HIGH (ref 0–0.9)
MONOCYTES NFR BLD AUTO: 12.5 % — SIGNIFICANT CHANGE UP (ref 2–14)
NEUTROPHILS # BLD AUTO: 5.17 K/UL — SIGNIFICANT CHANGE UP (ref 1.8–7.4)
NEUTROPHILS NFR BLD AUTO: 70.1 % — SIGNIFICANT CHANGE UP (ref 43–77)
NRBC # BLD: 0 /100 WBCS — SIGNIFICANT CHANGE UP (ref 0–0)
NRBC BLD-RTO: 0 /100 WBCS — SIGNIFICANT CHANGE UP (ref 0–0)
PHOSPHATE SERPL-MCNC: 4.4 MG/DL — SIGNIFICANT CHANGE UP (ref 2.5–4.5)
PLATELET # BLD AUTO: 332 K/UL — SIGNIFICANT CHANGE UP (ref 150–400)
POTASSIUM SERPL-MCNC: 4.2 MMOL/L — SIGNIFICANT CHANGE UP (ref 3.5–5.3)
POTASSIUM SERPL-SCNC: 4.2 MMOL/L — SIGNIFICANT CHANGE UP (ref 3.5–5.3)
PROT SERPL-MCNC: 5.9 G/DL — LOW (ref 6–8.3)
RBC # BLD: 3.13 M/UL — LOW (ref 4.2–5.8)
RBC # FLD: 19.7 % — HIGH (ref 10.3–14.5)
SODIUM SERPL-SCNC: 132 MMOL/L — LOW (ref 135–145)
SPECIMEN SOURCE: SIGNIFICANT CHANGE UP
WBC # BLD: 7.37 K/UL — SIGNIFICANT CHANGE UP (ref 3.8–10.5)
WBC # FLD AUTO: 7.37 K/UL — SIGNIFICANT CHANGE UP (ref 3.8–10.5)

## 2025-01-29 PROCEDURE — 99232 SBSQ HOSP IP/OBS MODERATE 35: CPT

## 2025-01-29 PROCEDURE — 99291 CRITICAL CARE FIRST HOUR: CPT

## 2025-01-29 PROCEDURE — G0545: CPT

## 2025-01-29 PROCEDURE — 99292 CRITICAL CARE ADDL 30 MIN: CPT | Mod: FS

## 2025-01-29 PROCEDURE — 71045 X-RAY EXAM CHEST 1 VIEW: CPT | Mod: 26

## 2025-01-29 RX ORDER — ACETAMINOPHEN 500 MG/5ML
1000 LIQUID (ML) ORAL ONCE
Refills: 0 | Status: COMPLETED | OUTPATIENT
Start: 2025-01-29 | End: 2025-01-29

## 2025-01-29 RX ORDER — DOBUTAMINE 250 MG/20ML
1 INJECTION INTRAVENOUS
Qty: 500 | Refills: 0 | Status: DISCONTINUED | OUTPATIENT
Start: 2025-01-29 | End: 2025-02-01

## 2025-01-29 RX ADMIN — Medication 100 MILLIGRAM(S): at 09:12

## 2025-01-29 RX ADMIN — OXYCODONE HYDROCHLORIDE 10 MILLIGRAM(S): 30 TABLET ORAL at 03:30

## 2025-01-29 RX ADMIN — METHOCARBAMOL 500 MILLIGRAM(S): 500 TABLET, FILM COATED ORAL at 12:15

## 2025-01-29 RX ADMIN — EPOETIN ALFA 10000 UNIT(S): 10000 SOLUTION INTRAVENOUS; SUBCUTANEOUS at 21:15

## 2025-01-29 RX ADMIN — Medication 5 MILLIGRAM(S): at 21:44

## 2025-01-29 RX ADMIN — ATORVASTATIN CALCIUM 80 MILLIGRAM(S): 80 TABLET, FILM COATED ORAL at 21:43

## 2025-01-29 RX ADMIN — Medication 1 APPLICATION(S): at 23:21

## 2025-01-29 RX ADMIN — LINEZOLID 300 MILLIGRAM(S): 2 INJECTION, SOLUTION INTRAVENOUS at 00:12

## 2025-01-29 RX ADMIN — Medication 81 MILLIGRAM(S): at 12:09

## 2025-01-29 RX ADMIN — METHOCARBAMOL 500 MILLIGRAM(S): 500 TABLET, FILM COATED ORAL at 05:24

## 2025-01-29 RX ADMIN — Medication 1 TABLET(S): at 12:09

## 2025-01-29 RX ADMIN — ACETYLCYSTEINE 4 MILLILITER(S): 200 INHALANT RESPIRATORY (INHALATION) at 17:29

## 2025-01-29 RX ADMIN — OXYCODONE HYDROCHLORIDE 5 MILLIGRAM(S): 30 TABLET ORAL at 05:00

## 2025-01-29 RX ADMIN — LIDOCAINE HYDROCHLORIDE 1 PATCH: 20 JELLY TOPICAL at 12:09

## 2025-01-29 RX ADMIN — Medication 1 APPLICATION(S): at 05:58

## 2025-01-29 RX ADMIN — Medication 400 MILLIGRAM(S): at 16:00

## 2025-01-29 RX ADMIN — Medication 40 MILLIGRAM(S): at 12:09

## 2025-01-29 RX ADMIN — Medication 2.5 MILLIGRAM(S): at 05:10

## 2025-01-29 RX ADMIN — ACETYLCYSTEINE 4 MILLILITER(S): 200 INHALANT RESPIRATORY (INHALATION) at 11:53

## 2025-01-29 RX ADMIN — OXYCODONE HYDROCHLORIDE 10 MILLIGRAM(S): 30 TABLET ORAL at 19:30

## 2025-01-29 RX ADMIN — OXYCODONE HYDROCHLORIDE 5 MILLIGRAM(S): 30 TABLET ORAL at 12:15

## 2025-01-29 RX ADMIN — DOBUTAMINE 5.11 MICROGRAM(S)/KG/MIN: 250 INJECTION INTRAVENOUS at 09:13

## 2025-01-29 RX ADMIN — OXYCODONE HYDROCHLORIDE 5 MILLIGRAM(S): 30 TABLET ORAL at 20:19

## 2025-01-29 RX ADMIN — Medication 1000 MILLIGRAM(S): at 16:30

## 2025-01-29 RX ADMIN — Medication 1 SPRAY(S): at 17:34

## 2025-01-29 RX ADMIN — Medication 2.5 MILLIGRAM(S): at 17:29

## 2025-01-29 RX ADMIN — Medication 2.5 MILLIGRAM(S): at 11:53

## 2025-01-29 RX ADMIN — Medication 50 MILLIGRAM(S): at 21:43

## 2025-01-29 RX ADMIN — HEPARIN SODIUM 5000 UNIT(S): 1000 INJECTION INTRAVENOUS; SUBCUTANEOUS at 21:43

## 2025-01-29 RX ADMIN — MEROPENEM 100 MILLIGRAM(S): 1 INJECTION INTRAVENOUS at 13:45

## 2025-01-29 RX ADMIN — MIRTAZAPINE 7.5 MILLIGRAM(S): 30 TABLET, FILM COATED ORAL at 12:09

## 2025-01-29 RX ADMIN — ACETYLCYSTEINE 4 MILLILITER(S): 200 INHALANT RESPIRATORY (INHALATION) at 23:02

## 2025-01-29 RX ADMIN — LIDOCAINE HYDROCHLORIDE 1 PATCH: 20 JELLY TOPICAL at 00:45

## 2025-01-29 RX ADMIN — GABAPENTIN 100 MILLIGRAM(S): 400 CAPSULE ORAL at 05:24

## 2025-01-29 RX ADMIN — OXYCODONE HYDROCHLORIDE 5 MILLIGRAM(S): 30 TABLET ORAL at 05:30

## 2025-01-29 RX ADMIN — HEPARIN SODIUM 5000 UNIT(S): 1000 INJECTION INTRAVENOUS; SUBCUTANEOUS at 05:24

## 2025-01-29 RX ADMIN — Medication 100 MILLIGRAM(S): at 20:20

## 2025-01-29 RX ADMIN — Medication 15 MILLILITER(S): at 05:24

## 2025-01-29 RX ADMIN — Medication 10 MILLIGRAM(S): at 08:29

## 2025-01-29 RX ADMIN — HEPARIN SODIUM 5000 UNIT(S): 1000 INJECTION INTRAVENOUS; SUBCUTANEOUS at 13:42

## 2025-01-29 RX ADMIN — OXYCODONE HYDROCHLORIDE 5 MILLIGRAM(S): 30 TABLET ORAL at 11:45

## 2025-01-29 RX ADMIN — OXYCODONE HYDROCHLORIDE 5 MILLIGRAM(S): 30 TABLET ORAL at 21:19

## 2025-01-29 RX ADMIN — ACETYLCYSTEINE 4 MILLILITER(S): 200 INHALANT RESPIRATORY (INHALATION) at 05:10

## 2025-01-29 RX ADMIN — GABAPENTIN 100 MILLIGRAM(S): 400 CAPSULE ORAL at 17:04

## 2025-01-29 RX ADMIN — OXYCODONE HYDROCHLORIDE 10 MILLIGRAM(S): 30 TABLET ORAL at 18:25

## 2025-01-29 RX ADMIN — Medication 108.6 MILLIGRAM(S): at 21:52

## 2025-01-29 RX ADMIN — Medication 10 MILLIGRAM(S): at 16:20

## 2025-01-29 RX ADMIN — LINEZOLID 300 MILLIGRAM(S): 2 INJECTION, SOLUTION INTRAVENOUS at 12:10

## 2025-01-29 RX ADMIN — OXYCODONE HYDROCHLORIDE 10 MILLIGRAM(S): 30 TABLET ORAL at 11:11

## 2025-01-29 RX ADMIN — OXYCODONE HYDROCHLORIDE 10 MILLIGRAM(S): 30 TABLET ORAL at 03:00

## 2025-01-29 RX ADMIN — DOBUTAMINE 5.11 MICROGRAM(S)/KG/MIN: 250 INJECTION INTRAVENOUS at 06:41

## 2025-01-29 RX ADMIN — OXYCODONE HYDROCHLORIDE 10 MILLIGRAM(S): 30 TABLET ORAL at 10:41

## 2025-01-29 RX ADMIN — METHOCARBAMOL 500 MILLIGRAM(S): 500 TABLET, FILM COATED ORAL at 21:43

## 2025-01-29 RX ADMIN — Medication 2.5 MILLIGRAM(S): at 23:02

## 2025-01-29 RX ADMIN — Medication 500 MILLIGRAM(S): at 12:09

## 2025-01-29 NOTE — PROGRESS NOTE ADULT - SUBJECTIVE AND OBJECTIVE BOX
Patient seen and examined at the bedside.    Remained critically ill on continuous ICU monitoring.    MEDICATIONS  (STANDING):  acetylcysteine 10%  Inhalation 4 milliLiter(s) Inhalation every 6 hours  acyclovir IVPB 430 milliGRAM(s) IV Intermittent <User Schedule>  albuterol    0.083% 2.5 milliGRAM(s) Nebulizer every 6 hours  ascorbic acid 500 milliGRAM(s) Oral daily  aspirin  chewable 81 milliGRAM(s) Oral daily  atorvastatin 80 milliGRAM(s) Oral at bedtime  chlorhexidine 0.12% Liquid 15 milliLiter(s) Oral Mucosa every 12 hours  chlorhexidine 2% Cloths 1 Application(s) Topical daily  chlorhexidine 4% Liquid 1 Application(s) Topical <User Schedule>  dextrose 50% Injectable 50 milliLiter(s) IV Push every 15 minutes  DOBUTamine Infusion 2 MICROgram(s)/kG/Min (5.11 mL/Hr) IV Continuous <Continuous>  epoetin ignacia (EPOGEN) Injectable 47545 Unit(s) IV Push <User Schedule>  gabapentin 100 milliGRAM(s) Oral every 12 hours  heparin   Injectable 5000 Unit(s) SubCutaneous every 8 hours  insulin lispro (ADMELOG) corrective regimen sliding scale   SubCutaneous every 6 hours  lidocaine   4% Patch 1 Patch Transdermal daily  linezolid  IVPB      linezolid  IVPB 600 milliGRAM(s) IV Intermittent every 12 hours  melatonin 5 milliGRAM(s) Oral at bedtime  methocarbamol 500 milliGRAM(s) Oral every 8 hours  mirtazapine 7.5 milliGRAM(s) Oral daily  Nephro-elicia 1 Tablet(s) Oral daily  pantoprazole  Injectable 40 milliGRAM(s) IV Push daily  polyethylene glycol 3350 17 Gram(s) Oral two times a day  sodium chloride 0.65% Nasal 1 Spray(s) Both Nostrils every 12 hours  sodium chloride 0.9%. 1000 milliLiter(s) (10 mL/Hr) IV Continuous <Continuous>  theophylline ER Capsule 100 milliGRAM(s) Oral <User Schedule>  traZODone 50 milliGRAM(s) Oral at bedtime    MEDICATIONS  (PRN):  acetaminophen     Tablet .. 650 milliGRAM(s) Oral every 6 hours PRN Mild Pain (1 - 3)  lidocaine/prilocaine Cream 1 Application(s) Topical daily PRN pre-HD  oxyCODONE    IR 5 milliGRAM(s) Oral every 6 hours PRN Moderate Pain (4 - 6)  oxyCODONE    IR 10 milliGRAM(s) Oral every 6 hours PRN Severe Pain (7 - 10)  sodium chloride 0.9% lock flush 10 milliLiter(s) IV Push every 1 hour PRN Pre/post blood products, medications, blood draw, and to maintain line patency    Vital Signs Last 24 Hrs  T(C): 37 (29 Jan 2025 18:30), Max: 37 (29 Jan 2025 18:30)  T(F): 98.6 (29 Jan 2025 18:30), Max: 98.6 (29 Jan 2025 18:30)  HR: 86 (29 Jan 2025 20:00) (48 - 90)  BP: 175/82 (29 Jan 2025 20:00) (130/64 - 200/90)  BP(mean): 118 (29 Jan 2025 20:00) (91 - 129)  RR: 15 (29 Jan 2025 20:00) (9 - 29)  SpO2: 97% (29 Jan 2025 20:00) (93% - 100%)    Parameters below as of 29 Jan 2025 18:30  Patient On (Oxygen Delivery Method): ventilator    Physical Exam:   General: Alert and interactive,   Neurology: Oriented, following commands. ambulates  Eyes: bilateral pupils equal and reactive   ENT/Neck: Neck supple, trachea midline, No JVD   Respiratory: dec breath sounds at bases  CV: S1S2, +      [x] Sinus rhythm, [x] Temporary pacing - standby mode  Abdominal: Soft, NT, ND +BS   Extremities: 1-2+ pedal edema noted, + peripheral pulses   Skin: No Rashes, Hematoma, Ecchymosis

## 2025-01-29 NOTE — PROGRESS NOTE ADULT - ASSESSMENT
ssessment:  55 yo M s/p CABG x 3 on 12/30/24    Postop acute respiratory failure  Acute blood loss anemia  Ascites    ESRD on iHD   E coli bacteremia 2/2 pneumonia  VRE E. Faecium   HSV infection  Tracheomalacia     Plan:   ***Neuro***  [x] Nonfocal  Post operative neuro assessment   Gabapentin, Robaxin, Lido patches & PRNs for pain management  Nightly Trazodone, Remeron, Melatonin   OOBTC and walk as tolerated    ***Cardiovascular***  Invasive hemodynamic monitoring, assess perfusion indices   SR / Hct 29.5% / Lactate 1.0  Continuos reassessment of hemodynamics  [x] Dobutamine 2 mcgs/kg/min  [x] DVT ppx w/ SQ Heparin   [x]  ASA [x] Statin  Intermittent bradycardia/junction with hypotension - started on Theophyline  Serial EKG and cardiac enzymes     ***Pulmonary***  Bronched yesterday - BAL pending  [x] HFTC - 40L/40%  Titration of FiO2 and PEEP, follow SpO2, CXR, blood gasses     Mode: standby  FiO2: 40     ***Renal***  [x] ESRD on HD     ***ID***  Sepsis/septic shock due to ESBL E. coli - Meropenem   Zyvox started for VRE bacteremia   Acyclovir for + HSV in BAL    Dm2- iss

## 2025-01-29 NOTE — PROGRESS NOTE ADULT - SUBJECTIVE AND OBJECTIVE BOX
[facility]    Dr. Nica Styles     Office [phone] (9 am to 5 pm)  Service: [phone] (5pm to 9am)  Also Available on TEAMS      RENAL PROGRESS NOTE: DATE OF SERVICE 01-29-25 @ 11:20    Patient is a 54y old  Male who presents with a chief complaint of CAD s/p CABG (29 Jan 2025 07:14)      Patient seen and examined at bedside. No chest pain/sob    VITALS:  T(F): 97.4 (01-29-25 @ 07:00), Max: 99.1 (01-28-25 @ 12:00)  HR: 59 (01-29-25 @ 10:00)  BP: 130/64 (01-29-25 @ 10:00)  RR: 12 (01-29-25 @ 10:00)  SpO2: 99% (01-29-25 @ 10:00)  Wt(kg): --    01-28 @ 07:01 - 01-29 @ 07:00  --------------------------------------------------------  IN: 2265.1 mL / OUT: 0 mL / NET: 2265.1 mL    01-29 @ 07:01 - 01-29 @ 11:20  --------------------------------------------------------  IN: 270.3 mL / OUT: 0 mL / NET: 270.3 mL          PHYSICAL EXAM:  Constitutional: NAD  Neck: No JVD  Respiratory: CTAB, no wheezes, rales or rhonchi  Cardiovascular: S1, S2, RRR  Gastrointestinal: BS+, soft, NT/ND  Extremities: No peripheral edema    Hospital Medications:   MEDICATIONS  (STANDING):  acetylcysteine 10%  Inhalation 4 milliLiter(s) Inhalation every 6 hours  acyclovir IVPB 430 milliGRAM(s) IV Intermittent <User Schedule>  albuterol    0.083% 2.5 milliGRAM(s) Nebulizer every 6 hours  ascorbic acid 500 milliGRAM(s) Oral daily  aspirin  chewable 81 milliGRAM(s) Oral daily  atorvastatin 80 milliGRAM(s) Oral at bedtime  bisacodyl Suppository 10 milliGRAM(s) Rectal once  chlorhexidine 0.12% Liquid 15 milliLiter(s) Oral Mucosa every 12 hours  chlorhexidine 2% Cloths 1 Application(s) Topical daily  chlorhexidine 4% Liquid 1 Application(s) Topical <User Schedule>  dextrose 50% Injectable 50 milliLiter(s) IV Push every 15 minutes  DOBUTamine Infusion 2 MICROgram(s)/kG/Min (5.11 mL/Hr) IV Continuous <Continuous>  epoetin ignacia (EPOGEN) Injectable 47949 Unit(s) IV Push <User Schedule>  gabapentin 100 milliGRAM(s) Oral every 12 hours  heparin   Injectable 5000 Unit(s) SubCutaneous every 8 hours  insulin lispro (ADMELOG) corrective regimen sliding scale   SubCutaneous every 6 hours  lidocaine   4% Patch 1 Patch Transdermal daily  linezolid  IVPB      linezolid  IVPB 600 milliGRAM(s) IV Intermittent every 12 hours  melatonin 5 milliGRAM(s) Oral at bedtime  meropenem  IVPB 500 milliGRAM(s) IV Intermittent every 24 hours  meropenem  IVPB      methocarbamol 500 milliGRAM(s) Oral every 8 hours  mirtazapine 7.5 milliGRAM(s) Oral daily  Nephro-elicia 1 Tablet(s) Oral daily  pantoprazole  Injectable 40 milliGRAM(s) IV Push daily  polyethylene glycol 3350 17 Gram(s) Oral two times a day  sodium chloride 0.65% Nasal 1 Spray(s) Both Nostrils every 12 hours  sodium chloride 0.9%. 1000 milliLiter(s) (10 mL/Hr) IV Continuous <Continuous>  theophylline ER Capsule 100 milliGRAM(s) Oral <User Schedule>  traZODone 50 milliGRAM(s) Oral at bedtime      LABS:  01-29    132[L]  |  92[L]  |  32[H]  ----------------------------<  101[H]  4.2   |  26  |  5.51[H]    Ca    8.5      29 Jan 2025 00:58  Phos  4.4     01-29  Mg     2.4     01-29    TPro  5.9[L]  /  Alb  2.6[L]  /  TBili  0.4  /  DBili      /  AST  20  /  ALT  11  /  AlkPhos  118  01-29    Creatinine Trend: 5.51 <--, 4.57 <--, 6.22 <--, 5.22 <--, 4.32 <--, 5.64 <--, 4.69 <--    Albumin: 2.6 g/dL (01-29 @ 00:58)  Phosphorus: 4.4 mg/dL (01-29 @ 00:58)                              9.3    7.37  )-----------( 332      ( 29 Jan 2025 00:58 )             29.5     Urine Studies:  Urinalysis - [01-29-25 @ 00:58]      Color  / Appearance  / SG  / pH       Gluc 101 / Ketone   / Bili  / Urobili        Blood  / Protein  / Leuk Est  / Nitrite       RBC  / WBC  / Hyaline  / Gran  / Sq Epi  / Non Sq Epi  / Bacteria       Iron 29, TIBC 143, %sat 20      [12-19-24 @ 05:00]  Ferritin 904      [12-19-24 @ 05:00]  TSH 4.75      [12-24-24 @ 06:50]        RADIOLOGY & ADDITIONAL STUDIES:

## 2025-01-29 NOTE — PROGRESS NOTE ADULT - SUBJECTIVE AND OBJECTIVE BOX
Patient seen and examined at the bedside.    Remains critically ill on continuous ICU monitoring.      Brief Summary:  53 yo M with multiple medical problems including CAD s/p PCI in April 2024 s/p CABG x 3 on 12/30/24 1/2: Intubated for hypoxia & left lung white out s/p awake bronch with removal of copious mucopurulent secretions  1/4: s/p IABP insertion, sepsis/septic shock due to E coli   ESBL pneumonia, collapsed Left Lung, s/p multiple bronch   1/18 tracheostomy tube placement   1/22 VRE E. Faecium Bcx  1/24 +HSV PCR BAL    24 Hour events:  Tolerating iHD -1L yesterday  HFTC intermittently for 12 hour increments   Dobutamine restarted for symptomatic bradycardia/junctional and then stopped early morning   TTE yesterday with normal function, ,mod pericardial effusion   1/24 BAL VRE      Objective:  Vital Signs Last 24 Hrs  T(C): 36.6 (29 Jan 2025 00:00), Max: 37.3 (28 Jan 2025 12:00)  T(F): 97.9 (29 Jan 2025 00:00), Max: 99.1 (28 Jan 2025 12:00)  HR: 68 (29 Jan 2025 07:00) (48 - 82)  BP: 173/76 (29 Jan 2025 07:00) (123/64 - 189/88)  BP(mean): 109 (29 Jan 2025 07:00) (88 - 126)  RR: 16 (29 Jan 2025 07:00) (9 - 45)  SpO2: 99% (29 Jan 2025 07:00) (94% - 100%)    Parameters below as of 29 Jan 2025 06:09  Patient On (Oxygen Delivery Method): ventilator        Mode: CPAP with PS  FiO2: 40  PEEP: 10  PS: 15  MAP: 9  PIP: 21              Physical Exam:   General: Alert and interactive,   Neurology: Oriented, following commands. ambulates  Respiratory: Breath sounds bilaterally, trach collar  CV: Sinus  Abdominal: Soft, Nontender  Extremities: Warm, well-perfused  Right arm tender, but stable  -------------------------------------------------------------------------------------------------------------------------------    Labs:                        9.3    7.37  )-----------( 332      ( 29 Jan 2025 00:58 )             29.5     01-29    132[L]  |  92[L]  |  32[H]  ----------------------------<  101[H]  4.2   |  26  |  5.51[H]    Ca    8.5      29 Jan 2025 00:58  Phos  4.4     01-29  Mg     2.4     01-29    TPro  5.9[L]  /  Alb  2.6[L]  /  TBili  0.4  /  DBili  x   /  AST  20  /  ALT  11  /  AlkPhos  118  01-29    LIVER FUNCTIONS - ( 29 Jan 2025 00:58 )  Alb: 2.6 g/dL / Pro: 5.9 g/dL / ALK PHOS: 118 U/L / ALT: 11 U/L / AST: 20 U/L / GGT: x           ------------------------------------------------------------------------------------------------------------------------------  Assessment:  53 yo M s/p CABG x 3 on 12/30/24    Postop acute respiratory failure  Acute blood loss anemia  Ascites    ESRD on iHD   E coli bacteremia 2/2 pneumonia  VRE E. Faecium   HSV infection  Tracheomalacia     Plan:  ***Neuro***  Postoperative acute pain control with Gabapentin and prns.  Gabapentin, Robaxin, Tylenol, Oxy prn for pain  PT ongoing    ***Cardiovascular***  s/p CABG x 3  A fib s/p cardioversion  intermittent bradycardia/junction with hypotension - EP eval   On Dobutamine   TTE 1/28 LVEF 60%, mod pericardial effusion   ASA / Statin daily    ***Pulmonary***  Postoperative acute respiratory failure  Tracheostomy 1/18   s/p Bronchoscopy 1/18. 1/20, 1/21, 1/24  Secretion in Left lung improving  Restart Mucomyst albuterol  Left lung PNA, + e coli ESBL - cleared but now with VRE   Tolerates trach collar, keep on a vent at night  On HT 3% nebs and albuterol    ***GI***  On Tube feeds at goal, change to vital per nutrition consult  Protonix for stress ulcer prophylaxis.   Ascites: Last paracentesis 1/11    ***Renal***  ESRD - iHD yesterday -1L, plan for iHD tomorrow - (MWF)  Nephro-Lisette for renal support    ***ID***  E. coli PNA -  Meropenem. inhaled tobramycin completed  Meropenem re-started, amikacin single dose  VRE bacteremia, on Linezolid - repeat cultures   Monitor cultures  1/24 VRE BAL  1/24 Acyclovir for + HSV in BAL  1/26 serratia on tissue cx     ***Endocrine***  Hyperglycemia - Insulin sliding scale    ***Hematology***  Acute blood loss anemia  CBC, coags  Continue  Epogen.  Heparin SQ for DVT  Needs full AC s/p cardioversion    *** Lines ***  RUE Midline    Care plan discussed with the ICU care team.   Patient remains critical, at risk for life threatening decompensation.    I have spent 30 minutes providing critical care management to this patient.    By signing my name below, I, Sonali Valle, attest that this documentation has been prepared under the direction and in the presence of Blaze Finch MD.  Electronically signed: Sonali Valle, 01-29-25 @ 08:04    I, Blaze Finch MD,  personally performed the services described in this documentation. all medical record entries made by the scribe were at my direction and in my presence. I have reviewed the chart and agree that the record reflects my personal performance and is accurate and complete  Electronically signed: Blaze Finch MD. Patient seen and examined at the bedside.    Remains critically ill on continuous ICU monitoring.      Brief Summary:  53 yo M with multiple medical problems including CAD s/p PCI in April 2024 s/p CABG x 3 on 12/30/24 1/2: Intubated for hypoxia & left lung white out s/p awake bronch with removal of copious mucopurulent secretions  1/4: s/p IABP insertion, sepsis/septic shock due to E coli   ESBL pneumonia, collapsed Left Lung, s/p multiple bronch   1/18 tracheostomy tube placement   1/22 VRE E. Faecium Bcx  1/24 +HSV PCR BAL  1/26 tissue cx Serratia   1/27-29 - intermittent bradycardia/junctional episodes with hypotension    24 Hour events:  Bradycardia again overnight despite starting theophyline; dobutamine restarted   bronch yesterday - specimen sent  TC during the day, PC at night       Objective:  Vital Signs Last 24 Hrs  T(C): 36.6 (29 Jan 2025 00:00), Max: 37.3 (28 Jan 2025 12:00)  T(F): 97.9 (29 Jan 2025 00:00), Max: 99.1 (28 Jan 2025 12:00)  HR: 68 (29 Jan 2025 07:00) (48 - 82)  BP: 173/76 (29 Jan 2025 07:00) (123/64 - 189/88)  BP(mean): 109 (29 Jan 2025 07:00) (88 - 126)  RR: 16 (29 Jan 2025 07:00) (9 - 45)  SpO2: 99% (29 Jan 2025 07:00) (94% - 100%)    Parameters below as of 29 Jan 2025 06:09  Patient On (Oxygen Delivery Method): ventilator        Mode: CPAP with PS  FiO2: 40  PEEP: 10  PS: 15  MAP: 9  PIP: 21              Physical Exam:   General: Alert and interactive,   Neurology: Oriented, following commands. ambulates  Respiratory: Breath sounds bilaterally, trach collar  CV: Sinus  Abdominal: Soft, Nontender  Extremities: Warm, well-perfused  Right arm tender, but stable  -------------------------------------------------------------------------------------------------------------------------------    Labs:                        9.3    7.37  )-----------( 332      ( 29 Jan 2025 00:58 )             29.5     01-29    132[L]  |  92[L]  |  32[H]  ----------------------------<  101[H]  4.2   |  26  |  5.51[H]    Ca    8.5      29 Jan 2025 00:58  Phos  4.4     01-29  Mg     2.4     01-29    TPro  5.9[L]  /  Alb  2.6[L]  /  TBili  0.4  /  DBili  x   /  AST  20  /  ALT  11  /  AlkPhos  118  01-29    LIVER FUNCTIONS - ( 29 Jan 2025 00:58 )  Alb: 2.6 g/dL / Pro: 5.9 g/dL / ALK PHOS: 118 U/L / ALT: 11 U/L / AST: 20 U/L / GGT: x           ------------------------------------------------------------------------------------------------------------------------------  Assessment:  53 yo M s/p CABG x 3 on 12/30/24    Postop acute respiratory failure  Acute blood loss anemia  Ascites    ESRD on iHD   E coli bacteremia 2/2 pneumonia  VRE E. Faecium   HSV infection  Tracheomalacia     Plan:  ***Neuro***  Postoperative acute pain control with Gabapentin and prns.  Gabapentin, Robaxin, Tylenol, Oxy prn for pain  PT ongoing    ***Cardiovascular***  s/p CABG x 3  A fib s/p cardioversion  intermittent bradycardia/junction with hypotension - started on theophyline - EP eval   Dobutamine for chronotropy   TTE 1/27 LVEF 60%, mod RA pericardial effusion   ASA / Statin daily    ***Pulmonary***  Postoperative acute respiratory failure  Tracheostomy 1/18   s/p Bronchoscopy 1/18. 1/20, 1/21, 1/24  Secretion in Left lung improving  Restart Mucomyst albuterol  Left lung PNA, + e coli ESBL - cleared but now with VRE   Tolerates trach collar with high flow, keep on a vent at night  On HT 3% nebs and albuterol    ***GI***  On Tube feeds at goal, change to vital per nutrition consult  Protonix for stress ulcer prophylaxis.   Ascites: Last paracentesis 1/11    ***Renal***  ESRD - tolerating iHD (MWF)   Nephro-Lisette for renal support    ***ID***  E. coli PNA -  Meropenem. inhaled tobramycin completed  Meropenem re-started, amikacin single dose  VRE bacteremia, on Linezolid - repeat cultures   Monitor cultures  1/24 VRE BAL   1/24 Acyclovir for + HSV in BAL  1/26 serratia on tissue cx     ***Endocrine***  Hyperglycemia - Insulin sliding scale    ***Hematology***  Acute blood loss anemia  CBC, coags  Continue  Epogen.  Heparin SQ for DVT  Needs full AC s/p cardioversion    *** Lines ***  RUE Midline    Care plan discussed with the ICU care team.   Patient remains critical, at risk for life threatening decompensation.    I have spent 40 minutes providing critical care management to this patient.    By signing my name below, I, Sonali Valle, attest that this documentation has been prepared under the direction and in the presence of Blaze Finch MD.  Electronically signed: Sonali Valle, 01-29-25 @ 08:04    I, Blaze Finch MD,  personally performed the services described in this documentation. all medical record entries made by the scribe were at my direction and in my presence. I have reviewed the chart and agree that the record reflects my personal performance and is accurate and complete  Electronically signed: Blaze Finch MD.

## 2025-01-29 NOTE — PROGRESS NOTE ADULT - ASSESSMENT
53 y/o male with PMHx of hypertension, ESRD on HD, CAD (s/p PCI in 4/2024), chronic systolic heart failure, chronic ascites, recent admission for cholecystitis s/p cholecystectomy who was admitted for acute decompensated heart failure and found to have abnormal nuclear stress. LHC showed multi-vessel coronary disease, for which he underwent a CABG (LIMA - LAD | SVG - D1 | SVG - LPL) on 12/30/24. Post-op course complicated by shock requiring IABP + dobutamine, ESBL E. coli bacteremia, new-onset atrial fibrillation (1/6/25) for which pt underwent DEU/DCCV in CTU on 1/10/25. C/b ESBL pneumonia, collapsed Left Lung, s/p multiple bronch and tracheostomy 1/17, + VRE 1/22 E. Faecium Bcx. EP reconsulted for sinus bradycardia overnight/this AM.     #vagally mediated bradycardia  #CAD s/p CABG 12/30/24  #new onset AF  #respiratory failure s/p trach  #Ecoli bacteremia  #ESRD on HD    - tele notes bradycardia down to ~30s again this AM ~6. Review of tele notes p-p prolongation with bradycardia and intermittent junctional rhythm. Otherwise SR rates ~70-80s.   - Per patient, he was awake this AM however CTU nurse states he was asleep, pt asymptomatic.  - Pt has PW however not working per CTU?  - No BP drop noted with bradycardia today. Restarted on Dobutamine per CTU.   - Continue Theophylline 100mg BID for now..   - Of note, pt is s/p EDU/DCCV on 1/10, currently not on AC. Additionally, mod pericardial effusion noted on TTE 1/27. Would restart AC when OK per CTSx  - Keep on tele. Keep K>4, Mg>2  - Discussed with EP attending and primary team.  53 y/o male with PMHx of hypertension, ESRD on HD, CAD (s/p PCI in 4/2024), chronic systolic heart failure, chronic ascites, recent admission for cholecystitis s/p cholecystectomy who was admitted for acute decompensated heart failure and found to have abnormal nuclear stress. LHC showed multi-vessel coronary disease, for which he underwent a CABG (LIMA - LAD | SVG - D1 | SVG - LPL) on 12/30/24. Post-op course complicated by shock requiring IABP + dobutamine, ESBL E. coli bacteremia, new-onset atrial fibrillation (1/6/25) for which pt underwent EDU/DCCV in CTU on 1/10/25. C/b ESBL pneumonia, collapsed Left Lung, s/p multiple bronch and tracheostomy 1/17, + VRE 1/22 E. Faecium Bcx. EP reconsulted for sinus bradycardia overnight/this AM.     #vagally mediated bradycardia  #CAD s/p CABG 12/30/24  #new onset AF  #respiratory failure s/p trach  #Ecoli bacteremia  #ESRD on HD    - tele notes bradycardia down to ~30s again this AM ~6, pt states he was awake however CTU nurse states he was asleep during event; asymptomatic. Review of tele notes p-p prolongation with bradycardia and intermittent junctional rhythm. Otherwise SR rates ~70-80s. Additional episode ~0945 down to ~40s (BP stable), pt states he was awake but coughing; asymptomatic.  - Pt has PW however not working per CTU?  - No BP drop noted with bradycardia today. Restarted on Dobutamine per CTU, management per CTU.   - Continue Theophylline 100mg BID for now.  - No indication for pacemaker at this time. Would ensure conservative measures taken to prevent episodes from occurring, regular suctioning, managing respiratory issues.   - Of note, pt is s/p EDU/DCCV on 1/10, currently not on AC. Additionally, mod pericardial effusion noted on TTE 1/27. Would restart AC when OK per CTSx  - Keep on tele. Keep K>4, Mg>2  - Discussed with EP attending and primary team.

## 2025-01-29 NOTE — PROGRESS NOTE ADULT - ASSESSMENT
54M with PMH of HTN, HLD, ESRD on HD MWF via LUE AVF, ascites (requiring repeat paracenteses q4-6wks), NICM with moderate LV dysfunction w/ EF 35-40%(2023) and CAD s/p atherectomy + SALLIE to D1(4/11/2024) presents to [facility] transferred from [facility] for CABG eval  Nephro on [facility] for HD    ESRD on HD   Regular schedule MWF   Access LUE AVF  [facility]  Nephrologist Dr. Bailey  Last HD on 12/24  S/p HD on 12/27 12/29, 12/30 for hyperkalemia and 12/31  2 L PUF On 01/02/2024  However hospital course now complicated by Cardiogenic shock now on multiple pressors,   CRRT initiated 01/03, off since 01/08   S/p PUF on 01/09   HD held on 01/10 due to instability,  S/p HD on 01/17, 1/20, 1/22, 1/24  s/p HD 1/27 uf 1L  HD today  Keep MAP>65  Renal Diet  Consent in physical chart    Hyper/Hypokalemia  Monitor K, will change dialysate fluid as needed    HTN  currently on pressure support  monitor bp     CKD MBD  Can send a PTH  Ca and Phos daily    Anemia  CRISTIANE with HD  Transfuse if hgb <7    CAD with multivessel disease  s/p CABG 12/30  CTICU following    Hypoxic Resp failure requiring Trach  Per surgery  S/p bronchoscopy, transplant pulm following

## 2025-01-29 NOTE — PROGRESS NOTE ADULT - ASSESSMENT
54M with PMH of HTN, HLD, ESRD on HD MWF via LUE AVF, ascites (requiring repeat paracenteses q4-6wks), NICM with moderate LV dysfunction w/ EF 35-40%() and CAD s/p atherectomy + SALLIE to D1(2024) presents to [facility] transferred from [facility]. Patient initially presented to [facility] ED with shortness of breath. Of note he was recently admitted from - for acute cholecystitis s/p cholecystectomy. In [facility] ED, pt was hypoxic to 86% on RA, trop 1.7K, EKG no new changes, CXR fluid overload. Admitted for AHRF 2/2 fluid overload. Pt received HD on , ,  and . DAPT was resumed, TTE showed improvement in LVEF to 40-45%. Stress test was abnormal with 1mm inferior STD, reversible ischemia in the inferior wall and fixed defects in the lateral, anterior and apical walls with post stress EF of 24%.     Pt was then transferred to [facility] for cardiac cath which showed pLAD 70% ISR, mLCx 70%, D1 severe dz. Patient now transferred to [facility] CTS Dr. Rivers for CABG eval.# CAD s/p NSTEMI  Hx CAD s/p PCI LAD   Presented with ADHF elevated troponins  Positive NST1-Cath- pLAD 70% ISR, mLCx 70%, D1 severe dz.   TTE EF 35% Severe TRWas on Plavix-p2y12- 321 been off Plavix since -POD#1-C3L free LIMA-LAD; SVG-Diag; FIN-imizPG-Qdpukuefp on  Sinus rhythm,Amio for AF prophylaxis  -OOB off  Sinus rhythm   -POD#3-On /pressors-EF 25-30% RV failure with transaminitis-Sinus Ksvrac62/03-POD#4-Was intubated for hypoxia-gradual hypotension on /pressors had IABP inserted this morning  -POD#5-s/p IABP insertion, sepsis/septic shock due to GNR/ECOLI HD cath placed   Bronched yesterday 1/3 - minimal secretions with rust-tinged sputum   ESBL-E Coli bacteremia on meropenam    - POD#7 Atrial Fib ,remains intubated weaning IABP 1:3,off pressors on CRRT  -IABP removed.Remains on vent-Alex 10ppm,off Levo- CVP 10 / MAP 71 / Hct 25.6% / Lactate 1.7-Atrial Fibrillation  -Intubated on Alex 10ppm, gtt, BP better-AF~~90's on Amio-had bronch still febrile Tmax 46824/09-Extubated awake alert on HFNC AF,on Dobutrex-CRRT,Cultures no growth    01/10-OOB on BiPAP Sinus Rhythm on -SR/ MAP 57/ CVP 10/  Hct 26.7 / Lact 1.4  -s/p EDU/DCCV-Sinus rhythm on -SR / MAP 52 / CVP 12/ Hct 25.8 / Lact 0.7-had bronch with BAL Left lung  -Sinus rhythm on -for repeat Bronch-SR / MAP 67 / CVP 11 / Hct 27.4% / Lact 0.8  -s/p Trach on  TTE EF 55%-SR / CVP 2 / MAP 63 / Hct 25.9% / Lactate 0.9  -Awake on Trach collar off  c/o buttock pain had bronch yesterday-BAL-Sinus rhythm  -Sinus rhythm on vent at night-BP stable may need to increase hydrallazine 25 mg q8  -TTE EF 60% still needs Vent support at night Bradycardic trial of Bryce now back on               # Acute on Chronic Systolic Heart Failure now improved  EF-35% ,severe TR  Had HD post op  GDMT post op  LVEF improved post bypass but now at 23-30%-BiV dysfunction on  now off pressors  -TTE EF 40%,trace effusion  ECG c/w pericarditis-was on colchicine   01/15-TTE EF 55%  -TTE EF 60%     Vascular evaluated  AVGraft /?steal causing RV failure-'' Very low suspicion of steal Sd and AVF causing current clinical state. ''   VA Duplex Hemodialysis Access, Left (25 @ 13:35) >   Arterial flow volume of 1301 mL/m, and the venous flow volume of  1492 mL/m are consistent with a normally functioning dialysis access  fistula.      # Ascites  Hx chronic ascites  Has drainage q4-6 weeks  Last drained -4600mL  ? ascites related to severe TR  Had reaqgksmnsom-Dsqsdsm-va growth   CT Abdomen 01/10-Large volume ascites-consider paracentesis  Monitor      # ESRD  Now off CRRT transition to HD     # Sacral Decubitus  Seen by Plastics and Gen Surgery

## 2025-01-29 NOTE — PROGRESS NOTE ADULT - ASSESSMENT
54M with HTN, HLD, ESRD on HD MWF via LUE AVF, ascites (requiring repeat paracenteses q4-6wks), NICM with moderate LV dysfunction w/ EF 35-40%() and CAD s/p atherectomy + SALLIE to D1 (2024) presents to [facility] 2024 as transfer from [facility] (via [facility]) where he presented  with SOB.   In [facility] ED, pt was hypoxic to 86% on RA, trop 1.7K, EKG no new changes, CXR fluid overload. Admitted for AHRF 2/2 fluid overload. Pt received HD on , ,  and . DAPT was resumed, TTE showed improvement in LVEF to 40-45%. Stress test was abnormal with 1mm inferior STD, reversible ischemia in the inferior wall and fixed defects in the lateral, anterior and apical walls with post stress EF of 24%. Pt was then transferred to [facility] for cardiac cath which showed pLAD 70% ISR, mLCx 70%, D1 severe dz. Patient now transferred to [facility] 2024 for CABG.       - underwent  C3L free LIMA-LAD; SVG-Diag; SVG-leftPL   - extubated   - hypotension and ECHO showing Systolic HF/depressed BIV Function, currently supported with /pressors.  Labs this evening showing transaminitis   - hypotensive, febrile and reintubated  1/3 - cultures (+) E coli +ESBL bacteremia   - underwent tracheostomy   - left lung collapse s/p bronchoscopy   - seen by plastic surgery / colorectal for sacral wound, no surgical intervention, no evidence of fistula, BC sent  - c/o right arm  pain, started on acyclovir for positive HSV PCR     doing a little better tissue culture with serratia; bronch also growing some yeast and VREC    Recommendations  - continue linezolid  - repeat BC  so far negative  - can continue meropenem for now (tissue culture with serratia, not clear what site taken from)  - f/u surgery  - yeast in bronch, not clear if true infection, monitor off antifungal for now  - consider sono Right forearm  - acyclovir, dosed for renal insufficiency  - await colorectal follow up for ? fluid collection / phlegmon

## 2025-01-29 NOTE — PROGRESS NOTE ADULT - SUBJECTIVE AND OBJECTIVE BOX
Patient seen and examined at the bedside.    Remained critically ill on continuous ICU monitoring.    OBJECTIVE:  Vital Signs Last 24 Hrs  T(C): 37 (29 Jan 2025 18:30), Max: 37 (29 Jan 2025 18:30)  T(F): 98.6 (29 Jan 2025 18:30), Max: 98.6 (29 Jan 2025 18:30)  HR: 86 (29 Jan 2025 20:00) (48 - 90)  BP: 175/82 (29 Jan 2025 20:00) (130/64 - 200/90)  BP(mean): 118 (29 Jan 2025 20:00) (91 - 129)  RR: 15 (29 Jan 2025 20:00) (9 - 29)  SpO2: 97% (29 Jan 2025 20:00) (93% - 100%)    Parameters below as of 29 Jan 2025 18:30  Patient On (Oxygen Delivery Method): ventilator    Physical Exam:   General: Alert and interactive,   Neurology: Oriented, following commands. ambulates  Eyes: bilateral pupils equal and reactive   ENT/Neck: Neck supple, trachea midline, No JVD   Respiratory: Clear bilaterally   CV: S1S2, no murmurs        [x] Sinus rhythm, [x] Temporary pacing - standby mode  Abdominal: Soft, NT, ND +BS   Extremities: 1-2+ pedal edema noted, + peripheral pulses   Skin: No Rashes, Hematoma, Ecchymosis            Assessment:  55 yo M s/p CABG x 3 on 12/30/24    Postop acute respiratory failure  Acute blood loss anemia  Ascites    ESRD on iHD   E coli bacteremia 2/2 pneumonia  VRE E. Faecium   HSV infection  Tracheomalacia     Plan:   ***Neuro***  [x] Nonfocal  Post operative neuro assessment   Gabapentin, Robaxin, Lido patches & PRNs for pain management  Nightly Trazodone, Remeron, Melatonin   OOBTC and walk as tolerated    ***Cardiovascular***  Invasive hemodynamic monitoring, assess perfusion indices   SR / Hct 29.5% / Lactate 1.0  Continuos reassessment of hemodynamics  [x] Dobutamine 2 mcgs/kg/min  [x] DVT ppx w/ SQ Heparin   [x]  ASA [x] Statin  Intermittent bradycardia/junction with hypotension - started on Theophyline  Serial EKG and cardiac enzymes     ***Pulmonary***  Bronched yesterday - BAL pending  [x] HFTC - 40L/40%  Titration of FiO2 and PEEP, follow SpO2, CXR, blood gasses     Mode: standby  FiO2: 40            ***GI***  [x] TF's at goal of 40 ccs  [x] Protonix for stress ulcer prophylaxis  Bowel regimen with Miralax     ***Renal***  [x] ESRD on HD   Continue to monitor I/Os, BUN/Creatinine.   Replete lytes PRN  Nephro-Lisette & EPOGEN for renal support    ***ID***  Sepsis/septic shock due to ESBL E. coli - Meropenem   Zyvox started for VRE bacteremia   Acyclovir for + HSV in BAL    ***Endocrine***  [x]  DM2 : HbA1c 5.6%                - [x] ISS              - Need tight glycemic control to prevent wound infection.    Patient requires continuous monitoring with bedside rhythm monitoring, pulse oximetry monitoring, and continuous central venous and arterial pressure monitoring; and intermittent blood gas analysis. Care plan discussed with the ICU care team.   Patient remained critical, at risk for life threatening decompensation.    I have spent 65 minutes providing critical care management to this patient.    By signing my name below, I, Gillian Taylor, attest that this documentation has been prepared under the direction and in the presence of Judith Marie NP  Electronically signed: Evelio Dickens, 01-29-25 @ 20:45    I, Judith Marie NP, personally performed the services described in this documentation. all medical record entries made by the shubhamibadrlene were at my direction and in my presence. I have reviewed the chart and agree that the record reflects my personal performance and is accurate and complete  Electronically signed: Judith Marie NP

## 2025-01-29 NOTE — PROGRESS NOTE ADULT - SUBJECTIVE AND OBJECTIVE BOX
Patient is a 54y old  Male who presents with a chief complaint of s/p CABG x 3 (23 Jan 2025 07:06)    f/u VRE bacteremia / sacral phlegmon    Interval History/ROS:       PAST MEDICAL & SURGICAL HISTORY:  Type 2 diabetes mellitus  Hypertension  ESRD on HD since 2019 - T/Th/Sat  Bone spur, right shoulder- hx of - sx done  Anemia  S/P arthroscopy of right shoulder 2010  H/O right wrist surgery, tendons repair - at age 14  AV fistula  H/O ventral hernia repair  S/P cholecystectomy    Allergies  No Known Allergies    ANTIMICROBIALS:  cefuroxime  IVPB (12/31-1/1)  piperacillin/tazobactam IVPB (1/2-1/3)  vancomycin  IVPB (1/2-1/4, 1/22)  gentamicin   IVPB (1/3 x1)  amikacin  IVPB (1/11, 1/24)  tobramycin for Nebulization (1/11-1/19)  vancomycin    Solution (1/12-1/19)  fluconAZOLE IVPB (1/14-1/15)    active:  acyclovir IVPB 430 <User Schedule>  linezolid  IVPB 600 every 12 hours  meropenem  IVPB 500 every 24 hours    MEDICATIONS  (STANDING):  acetylcysteine 10%  Inhalation 4 every 6 hours  albuterol    0.083% 2.5 every 6 hours  aspirin  chewable 81 daily  atorvastatin 80 at bedtime  bisacodyl Suppository 10 once  dextrose 50% Injectable 50 every 15 minutes  DOBUTamine Infusion 2 <Continuous>  epoetin ignacia (EPOGEN) Injectable 30708 <User Schedule>  gabapentin 100 every 12 hours  heparin   Injectable 5000 every 8 hours  insulin lispro (ADMELOG) corrective regimen sliding scale  every 6 hours  melatonin 5 at bedtime  methocarbamol 500 every 8 hours  mirtazapine 7.5 daily  pantoprazole  Injectable 40 daily  polyethylene glycol 3350 17 two times a day  theophylline ER Capsule 100 <User Schedule>  traZODone 50 at bedtime      Vital Signs Last 24 Hrs  T(F): 97.4 (01-29-25 @ 07:00), Max: 99.1 (01-28-25 @ 12:00)  HR: 59 (01-29-25 @ 10:00)  BP: 130/64 (01-29-25 @ 10:00)  RR: 12 (01-29-25 @ 10:00)  SpO2: 99% (01-29-25 @ 10:00) (94% - 100%)  Wt(kg): --    Physical Exam:                            9.3    7.37  )-----------( 332      ( 29 Jan 2025 00:58 )             29.5 01-29    132  |  92  |  32  ----------------------------<  101  4.2   |  26  |  5.51  Ca    8.5      29 Jan 2025 00:58Phos  4.4     01-29Mg     2.4     01-29  TPro  5.9  /  Alb  2.6  /  TBili  0.4  /  DBili  x   /  AST  20  /  ALT  11  /  AlkPhos  118  01-29    MICROBIOLOGY:  Culture - Bronchial (collected 01-28-25 @ 11:04)  Source: Bronchial  Gram Stain (01-29-25 @ 00:18):    Moderate polymorphonuclear leukocytes per low power field    Numerous Squamous epithelial cells per low power field    Few Yeast like cells per oil power field    Culture - Blood (collected 01-27-25 @ 16:58)  Source: .Blood BLOOD  Preliminary Report (01-28-25 @ 20:01):    No growth at 24 hours    Culture - Blood (collected 01-27-25 @ 16:58)  Source: .Blood BLOOD  Preliminary Report (01-28-25 @ 20:01):    No growth at 24 hours    Culture - Tissue with Gram Stain (collected 01-26-25 @ 16:55)  Source: Tissue  Gram Stain (01-27-25 @ 22:01):    Few polymorphonuclear leukocytes per low power field    No organisms seen per oil power field  Preliminary Report (01-27-25 @ 22:01):    Rare Serratia marcescens  Organism: Serratia marcescens (01-28-25 @ 20:59)  Organism: Serratia marcescens (01-28-25 @ 20:59)      -  Levofloxacin: S <=0.5      -  Tobramycin: R >8      -  Aztreonam: S <=4      -  Gentamicin: I 4      -  Cefazolin: R >16      -  Cefepime: S <=2      -  Piperacillin/Tazobactam: S <=8      -  Ciprofloxacin: S <=0.25      -  Ceftriaxone: S <=1      -  Ampicillin: R >16 These ampicillin results predict results for amoxicillin      Method Type: LISA      -  Meropenem: S <=1      -  Ampicillin/Sulbactam: R >16/8      -  Cefoxitin: R >16      -  Amoxicillin/Clavulanic Acid: R >16/8      -  Trimethoprim/Sulfamethoxazole: S <=0.5/9.5      -  Ertapenem: S <=0.5    Culture - Acid Fast - Bronchial w/Smear (collected 01-24-25 @ 14:12)  Source: Bronchial  Preliminary Report (01-25-25 @ 23:07):    Culture is being performed.    Culture - Fungal, Bronchial (collected 01-24-25 @ 14:12)  Source: Bronchial  Preliminary Report (01-26-25 @ 10:00):    Few Yeast    1/24 Quantitative BAL Immunocompromised (+) Numerous Enterococcus faecium (vancomycin resistant)  1/23 BC (-)  1/22 BC (+) Enterococcus faecium,VRE: Detec  1/20 Bronchial cx (-)  1/11 Bronchial AFB (-)  1/10 Bronchial cx (-)  1/9 BC (-)  1/7 BC (-)  1/5 BC (-)  1/3 Peritoneal Fungal, bacterial cx (-)  1/3 Bronchial AFB (-)  1/3 Bronchial cx (+) Numerous Escherichia coli ESBL  1/2 BC (+) Escherichia coli ESBL  1/2 Sputum cx (+) Escherichia coli ESBL    RADIOLOGY:  Xray Chest 1 View- PORTABLE-Routine (Xray Chest 1 View- PORTABLE-Routine in AM.) (01.29.25 @ 06:17) >  IMPRESSION:  Mild pulmonary edema appears similar.  Support devices as described above.      CT Chest No Cont (01.26.25 @ 14:48) >  IMPRESSION:  Decreased consolidation of the left upper and lower lobes.  Decreased partially loculated small left pleural effusion.  Increased small right pleural effusion.  Stable small to moderate pericardial effusion.    US Abdomen Limited (01.24.25 @ 21:55) >  IMPRESSION:  Cholecystectomy.  Small to moderate volume abdominal ascites with nonspecific fluid in the gallbladder fossa.    CT Chest w/ IV Cont (01.23.25 @ 12:34) >  IMPRESSION:  CHEST:  *  Near complete consolidation of the left lower lobe with volume loss and partial consolidation of the left upper lobe volume loss, suspect a combination of atelectasis and airspace disease. Groundglass opacities throughout the right lung and within nonconsolidated portions of the left   lung of uncertain etiology, possibly representing pulmonary edema or an infectious/inflammatory process.  *  Small bilateral pleural effusions, increased since 1/10/2025 with new loculation on the left.  *  Increased small to moderate pericardial effusion.  *  Redemonstrated nonocclusive thrombus in the right internal jugular vein, which partially encompasses the right IJ catheter.    CT Abdomen and Pelvis w/ IV Cont (01.23.25 @ 12:34) >  IMPRESSION:  ABDOMEN AND PELVIS:  *  Moderate to large ascites, mildly decreased since 1/10/2025.  Ill-defined infiltration within the mesentery and omentum with questioned pelvic peritoneal nodule. Suggest correlation with fluid sampling.  *  Nonspecific subcutaneous infiltration overlying the lower sacrum and coccyx on a background of anasarca. Ill-defined fluid located posterior and inferior to the coccyx measuring approximately 2.4 x 1.8 cm, which may represent confluent edema versus phlegmon or developing collection.  *  Cystic structure in the gallbladder fossa measuring 2.0 cm, possibly representing a gallbladder remnant or residual cystic duct, with a small collection not excluded.    US Chest (01.20.25 @ 13:46) >  IMPRESSION:  Minimal left diaphragmatic respiratory motion is seen, which is decreased relative to the right.    CT Abdomen and Pelvis w/ IV Cont (01.10.25 @ 15:41) >  IMPRESSION: Scattered consolidation in the left lung most severe in the lower lobe.  Nonocclusive thrombus right internal jugular vein. Large volume ascites.      ECHO:  TTE W or WO Ultrasound Enhancing Agent (01.19.25 @ 09:06) >  CONCLUSIONS:   1. Technically difficult image quality.   2. No pericardial effusion seen.   Patient is a 54y old  Male who presents with a chief complaint of s/p CABG x 3 (23 Jan 2025 07:06)    f/u VRE bacteremia / sacral phlegmon    Interval History/ROS:    pain sacrum    PAST MEDICAL & SURGICAL HISTORY:  Type 2 diabetes mellitus  Hypertension  ESRD on HD since 2019 - T/Th/Sat  Bone spur, right shoulder- hx of - sx done  Anemia  S/P arthroscopy of right shoulder 2010  H/O right wrist surgery, tendons repair - at age 14  AV fistula  H/O ventral hernia repair  S/P cholecystectomy    Allergies  No Known Allergies    ANTIMICROBIALS:  cefuroxime  IVPB (12/31-1/1)  piperacillin/tazobactam IVPB (1/2-1/3)  vancomycin  IVPB (1/2-1/4, 1/22)  gentamicin   IVPB (1/3 x1)  amikacin  IVPB (1/11, 1/24)  tobramycin for Nebulization (1/11-1/19)  vancomycin    Solution (1/12-1/19)  fluconAZOLE IVPB (1/14-1/15)    active:  acyclovir IVPB 430 <User Schedule>  linezolid  IVPB 600 every 12 hours  meropenem  IVPB 500 every 24 hours    MEDICATIONS  (STANDING):  acetylcysteine 10%  Inhalation 4 every 6 hours  albuterol    0.083% 2.5 every 6 hours  aspirin  chewable 81 daily  atorvastatin 80 at bedtime  bisacodyl Suppository 10 once  dextrose 50% Injectable 50 every 15 minutes  DOBUTamine Infusion 2 <Continuous>  epoetin ignacia (EPOGEN) Injectable 88617 <User Schedule>  gabapentin 100 every 12 hours  heparin   Injectable 5000 every 8 hours  insulin lispro (ADMELOG) corrective regimen sliding scale  every 6 hours  melatonin 5 at bedtime  methocarbamol 500 every 8 hours  mirtazapine 7.5 daily  pantoprazole  Injectable 40 daily  polyethylene glycol 3350 17 two times a day  theophylline ER Capsule 100 <User Schedule>  traZODone 50 at bedtime    Vital Signs Last 24 Hrs  T(F): 97.4 (01-29-25 @ 07:00), Max: 99.1 (01-28-25 @ 12:00)  HR: 59 (01-29-25 @ 10:00)  BP: 130/64 (01-29-25 @ 10:00)  RR: 12 (01-29-25 @ 10:00)  SpO2: 99% (01-29-25 @ 10:00) (94% - 100%)  Wt(kg): --    Physical Exam:  Constitutional: non-toxic  HEAD/EYES: anicteric  ENT:  trach site okay  Cardiovascular:   normal S1, S2  Respiratory:  clear BS bilaterally  GI:  soft, non-tender, normal bowel sounds  :  no blunt  Musculoskeletal:  no synovitis  Neurologic: awake and alert, no focal findings  Skin:  scabbed lesion RUE  Psychiatric:  awake, alert, appropriate mood                        9.3    7.37  )-----------( 332      ( 29 Jan 2025 00:58 )             29.5 01-29    132  |  92  |  32  ----------------------------<  101  4.2   |  26  |  5.51  Ca    8.5      29 Jan 2025 00:58Phos  4.4     01-29Mg     2.4     01-29  TPro  5.9  /  Alb  2.6  /  TBili  0.4  /  DBili  x   /  AST  20  /  ALT  11  /  AlkPhos  118  01-29    MICROBIOLOGY:  Culture - Bronchial (collected 01-28-25 @ 11:04)  Source: Bronchial  Gram Stain (01-29-25 @ 00:18):    Moderate polymorphonuclear leukocytes per low power field    Numerous Squamous epithelial cells per low power field    Few Yeast like cells per oil power field    Culture - Blood (collected 01-27-25 @ 16:58)  Source: .Blood BLOOD  Preliminary Report (01-28-25 @ 20:01):    No growth at 24 hours    Culture - Blood (collected 01-27-25 @ 16:58)  Source: .Blood BLOOD  Preliminary Report (01-28-25 @ 20:01):    No growth at 24 hours    Culture - Tissue with Gram Stain (collected 01-26-25 @ 16:55)  Source: Tissue  Gram Stain (01-27-25 @ 22:01):    Few polymorphonuclear leukocytes per low power field    No organisms seen per oil power field  Preliminary Report (01-27-25 @ 22:01):    Rare Serratia marcescens  Organism: Serratia marcescens (01-28-25 @ 20:59)  Organism: Serratia marcescens (01-28-25 @ 20:59)      -  Levofloxacin: S <=0.5      -  Tobramycin: R >8      -  Aztreonam: S <=4      -  Gentamicin: I 4      -  Cefazolin: R >16      -  Cefepime: S <=2      -  Piperacillin/Tazobactam: S <=8      -  Ciprofloxacin: S <=0.25      -  Ceftriaxone: S <=1      -  Ampicillin: R >16 These ampicillin results predict results for amoxicillin      Method Type: LISA      -  Meropenem: S <=1      -  Ampicillin/Sulbactam: R >16/8      -  Cefoxitin: R >16      -  Amoxicillin/Clavulanic Acid: R >16/8      -  Trimethoprim/Sulfamethoxazole: S <=0.5/9.5      -  Ertapenem: S <=0.5    Culture - Acid Fast - Bronchial w/Smear (collected 01-24-25 @ 14:12)  Source: Bronchial  Preliminary Report (01-25-25 @ 23:07):    Culture is being performed.    Culture - Fungal, Bronchial (collected 01-24-25 @ 14:12)  Source: Bronchial  Preliminary Report (01-26-25 @ 10:00):    Few Yeast    1/24 Quantitative BAL Immunocompromised (+) Numerous Enterococcus faecium (vancomycin resistant)  1/23 BC (-)  1/22 BC (+) Enterococcus faecium,VRE: Detec  1/20 Bronchial cx (-)  1/11 Bronchial AFB (-)  1/10 Bronchial cx (-)  1/9 BC (-)  1/7 BC (-)  1/5 BC (-)  1/3 Peritoneal Fungal, bacterial cx (-)  1/3 Bronchial AFB (-)  1/3 Bronchial cx (+) Numerous Escherichia coli ESBL  1/2 BC (+) Escherichia coli ESBL  1/2 Sputum cx (+) Escherichia coli ESBL    RADIOLOGY:  Xray Chest 1 View- PORTABLE-Routine (Xray Chest 1 View- PORTABLE-Routine in AM.) (01.29.25 @ 06:17) >  IMPRESSION:  Mild pulmonary edema appears similar.  Support devices as described above.      CT Chest No Cont (01.26.25 @ 14:48) >  IMPRESSION:  Decreased consolidation of the left upper and lower lobes.  Decreased partially loculated small left pleural effusion.  Increased small right pleural effusion.  Stable small to moderate pericardial effusion.    US Abdomen Limited (01.24.25 @ 21:55) >  IMPRESSION:  Cholecystectomy.  Small to moderate volume abdominal ascites with nonspecific fluid in the gallbladder fossa.    CT Chest w/ IV Cont (01.23.25 @ 12:34) >  IMPRESSION:  CHEST:  *  Near complete consolidation of the left lower lobe with volume loss and partial consolidation of the left upper lobe volume loss, suspect a combination of atelectasis and airspace disease. Groundglass opacities throughout the right lung and within nonconsolidated portions of the left   lung of uncertain etiology, possibly representing pulmonary edema or an infectious/inflammatory process.  *  Small bilateral pleural effusions, increased since 1/10/2025 with new loculation on the left.  *  Increased small to moderate pericardial effusion.  *  Redemonstrated nonocclusive thrombus in the right internal jugular vein, which partially encompasses the right IJ catheter.    CT Abdomen and Pelvis w/ IV Cont (01.23.25 @ 12:34) >  IMPRESSION:  ABDOMEN AND PELVIS:  *  Moderate to large ascites, mildly decreased since 1/10/2025.  Ill-defined infiltration within the mesentery and omentum with questioned pelvic peritoneal nodule. Suggest correlation with fluid sampling.  *  Nonspecific subcutaneous infiltration overlying the lower sacrum and coccyx on a background of anasarca. Ill-defined fluid located posterior and inferior to the coccyx measuring approximately 2.4 x 1.8 cm, which may represent confluent edema versus phlegmon or developing collection.  *  Cystic structure in the gallbladder fossa measuring 2.0 cm, possibly representing a gallbladder remnant or residual cystic duct, with a small collection not excluded.    US Chest (01.20.25 @ 13:46) >  IMPRESSION:  Minimal left diaphragmatic respiratory motion is seen, which is decreased relative to the right.    CT Abdomen and Pelvis w/ IV Cont (01.10.25 @ 15:41) >  IMPRESSION: Scattered consolidation in the left lung most severe in the lower lobe.  Nonocclusive thrombus right internal jugular vein. Large volume ascites.      ECHO:  TTE W or WO Ultrasound Enhancing Agent (01.19.25 @ 09:06) >  CONCLUSIONS:   1. Technically difficult image quality.   2. No pericardial effusion seen.

## 2025-01-29 NOTE — PROGRESS NOTE ADULT - SUBJECTIVE AND OBJECTIVE BOX
24H hour events:     MEDICATIONS:  aspirin  chewable 81 milliGRAM(s) Oral daily  DOBUTamine Infusion 2 MICROgram(s)/kG/Min IV Continuous <Continuous>  heparin   Injectable 5000 Unit(s) SubCutaneous every 8 hours    acyclovir IVPB 430 milliGRAM(s) IV Intermittent <User Schedule>  linezolid  IVPB      linezolid  IVPB 600 milliGRAM(s) IV Intermittent every 12 hours  meropenem  IVPB 500 milliGRAM(s) IV Intermittent every 24 hours  meropenem  IVPB        acetylcysteine 10%  Inhalation 4 milliLiter(s) Inhalation every 6 hours  albuterol    0.083% 2.5 milliGRAM(s) Nebulizer every 6 hours  theophylline ER Capsule 100 milliGRAM(s) Oral <User Schedule>    acetaminophen     Tablet .. 650 milliGRAM(s) Oral every 6 hours PRN  gabapentin 100 milliGRAM(s) Oral every 12 hours  melatonin 5 milliGRAM(s) Oral at bedtime  methocarbamol 500 milliGRAM(s) Oral every 8 hours  mirtazapine 7.5 milliGRAM(s) Oral daily  oxyCODONE    IR 5 milliGRAM(s) Oral every 6 hours PRN  oxyCODONE    IR 10 milliGRAM(s) Oral every 6 hours PRN  traZODone 50 milliGRAM(s) Oral at bedtime    bisacodyl Suppository 10 milliGRAM(s) Rectal once  pantoprazole  Injectable 40 milliGRAM(s) IV Push daily  polyethylene glycol 3350 17 Gram(s) Oral two times a day    atorvastatin 80 milliGRAM(s) Oral at bedtime  dextrose 50% Injectable 50 milliLiter(s) IV Push every 15 minutes  insulin lispro (ADMELOG) corrective regimen sliding scale   SubCutaneous every 6 hours    ascorbic acid 500 milliGRAM(s) Oral daily  benzocaine 20% Spray 1 Spray(s) Topical every 6 hours PRN  chlorhexidine 0.12% Liquid 15 milliLiter(s) Oral Mucosa every 12 hours  chlorhexidine 2% Cloths 1 Application(s) Topical daily  chlorhexidine 4% Liquid 1 Application(s) Topical <User Schedule>  epoetin ignacia (EPOGEN) Injectable 43811 Unit(s) IV Push <User Schedule>  lidocaine   4% Patch 1 Patch Transdermal daily  lidocaine/prilocaine Cream 1 Application(s) Topical daily PRN  Nephro-elicia 1 Tablet(s) Oral daily  sodium chloride 0.65% Nasal 1 Spray(s) Both Nostrils every 12 hours  sodium chloride 0.9% lock flush 10 milliLiter(s) IV Push every 1 hour PRN  sodium chloride 0.9%. 1000 milliLiter(s) IV Continuous <Continuous>      REVIEW OF SYSTEMS:  Complete 12point ROS negative.    PHYSICAL EXAM:  T(C): 36.3 (01-29-25 @ 07:00), Max: 37.3 (01-28-25 @ 12:00)  HR: 59 (01-29-25 @ 10:00) (48 - 82)  BP: 130/64 (01-29-25 @ 10:00) (130/64 - 189/88)  RR: 12 (01-29-25 @ 10:00) (9 - 45)  SpO2: 99% (01-29-25 @ 10:00) (94% - 100%)  Wt(kg): --  I&O's Summary    28 Jan 2025 07:01  -  29 Jan 2025 07:00  --------------------------------------------------------  IN: 2265.1 mL / OUT: 0 mL / NET: 2265.1 mL    29 Jan 2025 07:01  -  29 Jan 2025 10:46  --------------------------------------------------------  IN: 270.3 mL / OUT: 0 mL / NET: 270.3 mL        Appearance: Normal	  HEENT:   Normal oral mucosa, PERRL, EOMI	  Lymphatic: No lymphadenopathy  Cardiovascular: Normal S1 S2, No JVD, No murmurs, No edema  Respiratory: Lungs clear to auscultation	  Psychiatry: A & O x 3, Mood & affect appropriate  Gastrointestinal:  Soft, Non-tender, + BS	  Skin: No rashes, No ecchymoses, No cyanosis	  Neurologic: Non-focal  Extremities: Normal range of motion, No clubbing, cyanosis or edema  Vascular: Peripheral pulses palpable 2+ bilaterally        LABS:	 	    CBC Full  -  ( 29 Jan 2025 00:58 )  WBC Count : 7.37 K/uL  Hemoglobin : 9.3 g/dL  Hematocrit : 29.5 %  Platelet Count - Automated : 332 K/uL  Mean Cell Volume : 94.2 fl  Mean Cell Hemoglobin : 29.7 pg  Mean Cell Hemoglobin Concentration : 31.5 g/dL  Auto Neutrophil # : 5.17 K/uL  Auto Lymphocyte # : 0.60 K/uL  Auto Monocyte # : 0.92 K/uL  Auto Eosinophil # : 0.61 K/uL  Auto Basophil # : 0.05 K/uL  Auto Neutrophil % : 70.1 %  Auto Lymphocyte % : 8.1 %  Auto Monocyte % : 12.5 %  Auto Eosinophil % : 8.3 %  Auto Basophil % : 0.7 %    01-29    132[L]  |  92[L]  |  32[H]  ----------------------------<  101[H]  4.2   |  26  |  5.51[H]  01-28    134[L]  |  95[L]  |  27[H]  ----------------------------<  111[H]  4.2   |  27  |  4.57[H]    Ca    8.5      29 Jan 2025 00:58  Ca    8.3[L]      28 Jan 2025 00:24  Phos  4.4     01-29  Phos  3.9     01-28  Mg     2.4     01-29  Mg     2.1     01-28    TPro  5.9[L]  /  Alb  2.6[L]  /  TBili  0.4  /  DBili  x   /  AST  20  /  ALT  11  /  AlkPhos  118  01-29  TPro  5.5[L]  /  Alb  2.6[L]  /  TBili  0.4  /  DBili  x   /  AST  19  /  ALT  12  /  AlkPhos  116  01-28      proBNP:   Lipid Profile:   HgA1c:   TSH:       CARDIAC MARKERS:          TELEMETRY: 	    ECG:  	  RADIOLOGY:  OTHER: 	    PREVIOUS DIAGNOSTIC TESTING:    [ ] Echocardiogram:    [ ]  Catheterization:  [ ] Stress Test:  	  	  ASSESSMENT/PLAN: 	     24H hour events: tele with SR, another james event this AM at 6. Per patient, he was awake and resting however nurse states he was asleep. Pt denies chest pain/dizziness/palpitations.    MEDICATIONS:  aspirin  chewable 81 milliGRAM(s) Oral daily  DOBUTamine Infusion 2 MICROgram(s)/kG/Min IV Continuous <Continuous>  heparin   Injectable 5000 Unit(s) SubCutaneous every 8 hours  acyclovir IVPB 430 milliGRAM(s) IV Intermittent <User Schedule>  linezolid  IVPB      linezolid  IVPB 600 milliGRAM(s) IV Intermittent every 12 hours  meropenem  IVPB 500 milliGRAM(s) IV Intermittent every 24 hours  meropenem  IVPB      acetylcysteine 10%  Inhalation 4 milliLiter(s) Inhalation every 6 hours  albuterol    0.083% 2.5 milliGRAM(s) Nebulizer every 6 hours  theophylline ER Capsule 100 milliGRAM(s) Oral <User Schedule>  acetaminophen     Tablet .. 650 milliGRAM(s) Oral every 6 hours PRN  gabapentin 100 milliGRAM(s) Oral every 12 hours  melatonin 5 milliGRAM(s) Oral at bedtime  methocarbamol 500 milliGRAM(s) Oral every 8 hours  mirtazapine 7.5 milliGRAM(s) Oral daily  oxyCODONE    IR 5 milliGRAM(s) Oral every 6 hours PRN  oxyCODONE    IR 10 milliGRAM(s) Oral every 6 hours PRN  traZODone 50 milliGRAM(s) Oral at bedtime  bisacodyl Suppository 10 milliGRAM(s) Rectal once  pantoprazole  Injectable 40 milliGRAM(s) IV Push daily  polyethylene glycol 3350 17 Gram(s) Oral two times a day  atorvastatin 80 milliGRAM(s) Oral at bedtime  dextrose 50% Injectable 50 milliLiter(s) IV Push every 15 minutes  insulin lispro (ADMELOG) corrective regimen sliding scale   SubCutaneous every 6 hours  ascorbic acid 500 milliGRAM(s) Oral daily  benzocaine 20% Spray 1 Spray(s) Topical every 6 hours PRN  chlorhexidine 0.12% Liquid 15 milliLiter(s) Oral Mucosa every 12 hours  chlorhexidine 2% Cloths 1 Application(s) Topical daily  chlorhexidine 4% Liquid 1 Application(s) Topical <User Schedule>  epoetin ignacia (EPOGEN) Injectable 11751 Unit(s) IV Push <User Schedule>  lidocaine   4% Patch 1 Patch Transdermal daily  lidocaine/prilocaine Cream 1 Application(s) Topical daily PRN  Nephro-elicia 1 Tablet(s) Oral daily  sodium chloride 0.65% Nasal 1 Spray(s) Both Nostrils every 12 hours  sodium chloride 0.9% lock flush 10 milliLiter(s) IV Push every 1 hour PRN  sodium chloride 0.9%. 1000 milliLiter(s) IV Continuous <Continuous>    REVIEW OF SYSTEMS:  Complete 12point ROS negative.    PHYSICAL EXAM:  T(C): 36.3 (01-29-25 @ 07:00), Max: 37.3 (01-28-25 @ 12:00)  HR: 59 (01-29-25 @ 10:00) (48 - 82)  BP: 130/64 (01-29-25 @ 10:00) (130/64 - 189/88)  RR: 12 (01-29-25 @ 10:00) (9 - 45)  SpO2: 99% (01-29-25 @ 10:00) (94% - 100%)  Wt(kg): --  I&O's Summary    28 Jan 2025 07:01  -  29 Jan 2025 07:00  --------------------------------------------------------  IN: 2265.1 mL / OUT: 0 mL / NET: 2265.1 mL    29 Jan 2025 07:01  -  29 Jan 2025 10:46  --------------------------------------------------------  IN: 270.3 mL / OUT: 0 mL / NET: 270.3 mL        Appearance: Normal	  HEENT: NC/AT  Cardiovascular: Normal S1 S2  Respiratory: trach  Psychiatry: A & O x 3, Mood & affect appropriate  Neurologic: Non-focal  Extremities: No BLE edema    LABS:	 	    CBC Full  -  ( 29 Jan 2025 00:58 )  WBC Count : 7.37 K/uL  Hemoglobin : 9.3 g/dL  Hematocrit : 29.5 %  Platelet Count - Automated : 332 K/uL  Mean Cell Volume : 94.2 fl  Mean Cell Hemoglobin : 29.7 pg  Mean Cell Hemoglobin Concentration : 31.5 g/dL  Auto Neutrophil # : 5.17 K/uL  Auto Lymphocyte # : 0.60 K/uL  Auto Monocyte # : 0.92 K/uL  Auto Eosinophil # : 0.61 K/uL  Auto Basophil # : 0.05 K/uL  Auto Neutrophil % : 70.1 %  Auto Lymphocyte % : 8.1 %  Auto Monocyte % : 12.5 %  Auto Eosinophil % : 8.3 %  Auto Basophil % : 0.7 %    01-29    132[L]  |  92[L]  |  32[H]  ----------------------------<  101[H]  4.2   |  26  |  5.51[H]  01-28    134[L]  |  95[L]  |  27[H]  ----------------------------<  111[H]  4.2   |  27  |  4.57[H]    Ca    8.5      29 Jan 2025 00:58  Ca    8.3[L]      28 Jan 2025 00:24  Phos  4.4     01-29  Phos  3.9     01-28  Mg     2.4     01-29  Mg     2.1     01-28    TPro  5.9[L]  /  Alb  2.6[L]  /  TBili  0.4  /  DBili  x   /  AST  20  /  ALT  11  /  AlkPhos  118  01-29  TPro  5.5[L]  /  Alb  2.6[L]  /  TBili  0.4  /  DBili  x   /  AST  19  /  ALT  12  /  AlkPhos  116  01-28        proBNP: Pro-Brain Natriuretic Peptide (12.24.24 @ 06:50)    Pro-Brain Natriuretic Peptide: >46091 pg/mL    TSH: Free Thyroxine, Serum (12.24.24 @ 06:50)    Free Thyroxine, Serum: 1.0 ng/dL    CARDIAC MARKERS:Troponin I, High Sensitivity (12.20.24 @ 05:26)    Troponin I, High Sensitivity Result: 1458.2: Serial measurements of high sensitivity troponin I (hs TnI) showing rapid  upward or downward changes suggest acute myocardial injury.  Please note  that a sustained elevation of hsTnI can be caused by renal disease,  chronic heart failure, sepsis, pulmonary embolism and other clinical  conditions. ng/L    < from: CT Chest No Cont (01.26.25 @ 14:48) >    IMPRESSION:  Decreased consolidation of the left upper and lower lobes.  Decreased partially loculated small left pleural effusion.  Increased small right pleural effusion.  Stable small to moderate pericardial effusion.    < end of copied text >    RADIOLOGY: < from: Xray Chest 1 View- PORTABLE-Urgent (Xray Chest 1 View- PORTABLE-Urgent .) (01.28.25 @ 12:49) >    COMPARISON: Chest x-ray 1/26/2025.    FINDINGS:    1/27/2025, 6:16 AM  Tracheostomy. Enteric tube courses below diaphragm with tip collimated   out of view. Median sternotomy.  Small left pleural effusion. Perihilar interstitial opacities. Bibasilar   atelectasis. No focal consolidation or pneumothorax.  The heart size cannot be accurately assessed on this projection.  No acute osseous abnormalities.    1/28/2025, 6:03 AM  No significant interval change    1/28/2025, 12:32 PM  No significant interval change.    IMPRESSION:  Mild pulmonary edema. Small left pleural effusion. Bibasilar atelectasis.    < end of copied text >  PREVIOUS DIAGNOSTIC TESTING:    [ ] Echocardiogram: < from: TTE W or WO Ultrasound Enhancing Agent (01.27.25 @ 12:42) >     CONCLUSIONS:      1. This was a limited study to assess left ventricular systolic function.   2. Left ventricular systolic function is normal with an ejection fraction of 60 % by Michaels's method of disks.   3. Moderately enlarged right ventricular cavity size and mild to moderately reduced right ventricular systolic function.   4. Moderate pericardial effusion noted adjacent to the right atrium.   5. There is no definitive evidence of RA diastolic collapse in the available views. The pericardium proximal to the RV free wall was not well visualized. Due to limited views (as above, and no subcostal views), the study is indeterminate for the presence of echocardiographic signs of tamponade.   6. Left atrium is severely dilated.   7. Compared to the transthoracic echocardiogram performed on 1/19/2025, this was also a limited study. The area proximal to the right atrial free wall is better visualized on the present study and the echofree space most consistent with a pericardial effusion is more evident. There is again visual evidence of right ventricular dysfunction.      < end of copied text >    [ ]  Catheterization: < from: Cardiac Catheterization (12.23.24 @ 17:55) >  Diagnostic Conclusions:   Severe in-stent restenosis in LAD/D1 bifurcation. Severe in-stent  stenosis in mid Cx. Elevated LVEDP 25 mmHg.    Recommendations:   Consider CTS evaluation for possible CABG. Optimize medical therapy  for ischemic heart disease. Intensify anti-anginal  regimen. Aggressive risk factor modifcation.     < end of copied text >

## 2025-01-29 NOTE — PROGRESS NOTE ADULT - SUBJECTIVE AND OBJECTIVE BOX
MR#[id number]  PATIENT NAME:RAAD CANO    DATE OF SERVICE: 25 @ 07:15  Patient was seen and examined by Solo Quick MD on    25 @ 07:15 .  Interim events noted.Consultant notes ,Labs,Telemetry reviewed by me       HOSPITAL COURSE: HPI:  54M with PMH of HTN, HLD, ESRD on HD MWF via LUE AVF, ascites (requiring repeat paracenteses q4-6wks), NICM with moderate LV dysfunction w/ EF 35-40%() and CAD s/p atherectomy + SALLIE to D1(2024) presents to [facility] transferred from [facility]. Patient initially presented to [facility] ED with shortness of breath. Of note he was recently admitted from - for acute cholecystitis s/p cholecystectomy. In [facility] ED, pt was hypoxic to 86% on RA, trop 1.7K, EKG no new changes, CXR fluid overload. Admitted for AHRF 2/2 fluid overload. Pt received HD on , ,  and . DAPT was resumed, TTE showed improvement in LVEF to 40-45%. Stress test was abnormal with 1mm inferior STD, reversible ischemia in the inferior wall and fixed defects in the lateral, anterior and apical walls with post stress EF of 24%. Pt was then transferred to [facility] for cardiac cath which showed pLAD 70% ISR, mLCx 70%, D1 severe dz. Patient now transferred to [facility] CTS Dr. Rivers for CABG eval. Patient denies any current SOB or chest pain on admission. Otherwise denies headaches, abdominal pain, urinary or bowel changes, fevers, chills, N/V/D, sick contacts.  (24 Dec 2024 05:07)      INTERIM EVENTS:Patient seen at bedside ,interim events noted.   Cardiac cath  pLAD 70% ISR, mLCx 70%, D1 severe dz.   -POD#1-C3L free LIMA-LAD; SVG-Diag; SVG-leftPL Awake OOB extubated Sinus rhythm on Dobutamine gtt  - Was intubated last night for airway protection s/p bronchoscopy This morning had IABP inserted  remains sedated intubated Sinus Rhythm  - s/p IABP insertion, sepsis/septic shock due to GNR/ECOLI -Paracentesis for suspected bacterial peritonitis-no growth  -Awake on nocturnal vent support-Sinus rhythm-had been seen by Surgery for sacral wound  -Awake on Vent at nights noted bradycardic put back on  seen by EP      [facility] -reviewed admission note, no change since admission      MEDICATIONS  (STANDING):  acetylcysteine 10%  Inhalation 4 milliLiter(s) Inhalation every 6 hours  acyclovir IVPB 430 milliGRAM(s) IV Intermittent <User Schedule>  albuterol    0.083% 2.5 milliGRAM(s) Nebulizer every 6 hours  ascorbic acid 500 milliGRAM(s) Oral daily  aspirin  chewable 81 milliGRAM(s) Oral daily  atorvastatin 80 milliGRAM(s) Oral at bedtime  bisacodyl Suppository 10 milliGRAM(s) Rectal once  chlorhexidine 0.12% Liquid 15 milliLiter(s) Oral Mucosa every 12 hours  chlorhexidine 2% Cloths 1 Application(s) Topical daily  chlorhexidine 4% Liquid 1 Application(s) Topical <User Schedule>  dextrose 50% Injectable 50 milliLiter(s) IV Push every 15 minutes  DOBUTamine Infusion 2 MICROgram(s)/kG/Min (5.11 mL/Hr) IV Continuous <Continuous>  epoetin ignacia (EPOGEN) Injectable 70795 Unit(s) IV Push <User Schedule>  gabapentin 100 milliGRAM(s) Oral every 12 hours  heparin   Injectable 5000 Unit(s) SubCutaneous every 8 hours  insulin lispro (ADMELOG) corrective regimen sliding scale   SubCutaneous every 6 hours  lidocaine   4% Patch 1 Patch Transdermal daily  linezolid  IVPB      linezolid  IVPB 600 milliGRAM(s) IV Intermittent every 12 hours  melatonin 5 milliGRAM(s) Oral at bedtime  meropenem  IVPB 500 milliGRAM(s) IV Intermittent every 24 hours  meropenem  IVPB      methocarbamol 500 milliGRAM(s) Oral every 8 hours  mirtazapine 7.5 milliGRAM(s) Oral daily  Nephro-elicia 1 Tablet(s) Oral daily  pantoprazole  Injectable 40 milliGRAM(s) IV Push daily  polyethylene glycol 3350 17 Gram(s) Oral two times a day  sodium chloride 0.65% Nasal 1 Spray(s) Both Nostrils every 12 hours  sodium chloride 0.9%. 1000 milliLiter(s) (10 mL/Hr) IV Continuous <Continuous>  theophylline ER Capsule 100 milliGRAM(s) Oral <User Schedule>  traZODone 50 milliGRAM(s) Oral at bedtime    MEDICATIONS  (PRN):  acetaminophen     Tablet .. 650 milliGRAM(s) Oral every 6 hours PRN Mild Pain (1 - 3)  benzocaine 20% Spray 1 Spray(s) Topical every 6 hours PRN throat pain  lidocaine/prilocaine Cream 1 Application(s) Topical daily PRN pre-HD  oxyCODONE    IR 5 milliGRAM(s) Oral every 6 hours PRN Moderate Pain (4 - 6)  oxyCODONE    IR 10 milliGRAM(s) Oral every 6 hours PRN Severe Pain (7 - 10)  sodium chloride 0.9% lock flush 10 milliLiter(s) IV Push every 1 hour PRN Pre/post blood products, medications, blood draw, and to maintain line patency            REVIEW OF SYSTEMS:  Constitutional: [ ] fever, [ ]weight loss,  [x ]fatigue [ ]weight gain  Eyes: [ ] visual changes  Respiratory: [x ]shortness of breath;  [ x] cough, [ ]wheezing, [ ]chills, [ ]hemoptysis  Cardiovascular: [ ] chest pain, [ ]palpitations, [ ]dizziness,  [ ]leg swelling[ ]orthopnea[ ]PND  Gastrointestinal: [ ] abdominal pain, [ ]nausea, [ ]vomiting,  [ ]diarrhea [ ]Constipation [ ]Melena  Genitourinary: [ ] dysuria, [ ] hematuria [ ]Vigil  Neurologic: [ ] headaches [ ] tremors[ ]weakness [ ]Paralysis Right[ ] Left[ ]  Skin: [ ] itching, [ ]burning, [ ] rashes  Endocrine: [ ] heat or cold intolerance  Musculoskeletal: [ ] joint pain or swelling; [ ] muscle, back, or extremity pain  Psychiatric: [ ] depression, [ ]anxiety, [ ]mood swings, or [ ]difficulty sleeping  Hematologic: [ ] easy bruising, [ ] bleeding gums    [ ] All remaining systems negative except as per above.   [ ]Unable to obtain.  [x] No change in ROS since admission      Vital Signs Last 24 Hrs  T(C): 36.6 (2025 00:00), Max: 37.3 (2025 12:00)  T(F): 97.9 (2025 00:00), Max: 99.1 (2025 12:00)  HR: 68 (2025 07:00) (48 - 82)  BP: 173/76 (2025 07:00) (97/53 - 189/88)  BP(mean): 109 (2025 07:00) (70 - 126)  RR: 16 (2025 07:00) (9 - 45)  SpO2: 99% (2025 07:00) (94% - 100%)    Parameters below as of 2025 06:09  Patient On (Oxygen Delivery Method): ventilator      I&O's Summary    2025 07:01  -  2025 07:00  --------------------------------------------------------  IN: 2265.1 mL / OUT: 0 mL / NET: 2265.1 mL        PHYSICAL EXAM:  General: No acute distress BMI-28  HEENT: EOMI, PERRL  Neck: Supple, [ ] JVD[x] T Collar  Lungs: Equal air entry bilaterally; [ ] rales [ ] wheezing [ ] rhonchi  Heart: Regular rate and rhythm; [x ] murmur   2/6 [ x] systolic [ ] diastolic [ ] radiation[ ] rubs [ ]  gallops  Abdomen: Nontender, bowel sounds present  Extremities: No clubbing, cyanosis, [ ] edema [ ]Pulses  equal and intact  Nervous system:  Alert & Oriented X3, no focal deficits  Psychiatric: Normal affect  Skin: No rashes or lesions    LABS:      132[L]  |  92[L]  |  32[H]  ----------------------------<  101[H]  4.2   |  26  |  5.51[H]    Ca    8.5      2025 00:58  Phos  4.4       Mg     2.4         TPro  5.9[L]  /  Alb  2.6[L]  /  TBili  0.4  /  DBili  x   /  AST  20  /  ALT  11  /  AlkPhos  118      Creatinine Trend: 5.51<--, 4.57<--, 6.22<--, 5.22<--, 4.32<--, 5.64<--                        9.3    7.37  )-----------( 332      ( 2025 00:58 )             29.5          TTE W or WO Ultrasound Enhancing Agent (25 @ 12:42) >  CONCLUSIONS:      1. This was a limited study to assess left ventricular systolic function.   2. Left ventricular systolic function is normal with an ejection fraction of 60 % by Michaels's method of disks.   3. Moderately enlarged right ventricular cavity size and mild to moderately reduced right ventricular systolic function.   4. Moderate pericardial effusion noted adjacent to the right atrium.   5. There is no definitive evidence of RA diastolic collapse in the available views. The pericardium proximal to the RV free wall was not well visualized. Due to limited views (as above, and no subcostal views), the study is indeterminate for the presence of echocardiographic signs of tamponade.   6. Left atrium is severely dilated.   7. Compared to the transthoracic echocardiogram performed on 2025, this was also a limited study. The area proximal to the right atrial free wall is better visualized on the present study and the echofree space most consistent with a pericardial effusion is more evident. There is again visual evidence of right ventricular dysfunction.    Xray Chest 1 View- PORTABLE-Urgent (Xray Chest 1 View- PORTABLE-Urgent .) (25 @ 12:49) >  FINDINGS:    2025, 6:16 AM  Tracheostomy. Enteric tube courses below diaphragm with tip collimated  out of view. Median sternotomy.  Small left pleural effusion. Perihilar interstitial opacities. Bibasilar  atelectasis. No focal consolidation or pneumothorax.  The heart size cannot be accurately assessed on this projection.  No acute osseous abnormalities.    2025, 6:03 AM  No significant interval change    2025, 12:32 PM  No significant interval change.  60%,still needs nocturnal vent support-Br  IMPRESSION:  Mild pulmonary edema. Small left pleural effusion. Bibasilar atelectasis.    12 Lead ECG (25 @ 22:10) >  SINUS BRADYCARDIA  LEFT AXIS DEVIATION  INCOMPLETE RIGHT BUNDLE BRANCH BLOCK  ANTEROLATERAL INFARCT (CITED ON OR BEFORE 2025) , AGE UNDETERMINED  POSSIBLE INFERIOR INFARCT , AGE UNDETERMINED  ABNORMAL ECG

## 2025-01-29 NOTE — CHART NOTE - NSCHARTNOTEFT_GEN_A_CORE
NUTRITION FOLLOW UP NOTE    PATIENT SEEN FOR: follow up     SOURCE: [] Patient  [x] Current Medical Record  [x] Nursing  [] Family/support person at bedside  [x] Patient unavailable/inappropriate  [] Other:    CHART REVIEWED/EVENTS NOTED.  [] No changes to nutrition care plan to note  [x] Nutrition Status:  -s/p CABG x 3 on 24  -: Intubated for hypoxia & left lung white out   - tracheostomy tube placement   -ESRD - tolerating iHD     DIET ORDER:   Diet, NPO with Tube Feed:   Tube Feeding Modality: Nasogastric  Vital 1.5 Jeremias (VITAL1.5RTH)  Total Volume for 24 Hours (mL): 1440  Continuous  Starting Tube Feed Rate {mL per Hour}: 40  Until Goal Tube Feed Rate (mL per Hour): 60  Tube Feed Duration (in Hours): 24  Tube Feed Start Time: 15:28  No Carb Prosource (1pkg = 15gms Protein)     Qty per Day:  1  Banatrol TF     Qty per Day:  2 (25)  Diet, NPO after Midnight:      NPO Start Date: 15-Aquilino-2025,   NPO Start Time: 23:59 (01-15-25)    CURRENT DIET ORDER IS:  [] Appropriate:  [] Inadequate:  [x] Other: see below for recommendations    NUTRITION INTAKE/PROVISION:  [] PO:  [x] Enteral Nutrition:  -->Providing at 100% provision with prosource 2250kcal (29kcal/kg) and 113g protein (1.4g/kg)  [] Parenteral Nutrition:    ANTHROPOMETRICS:  Drug Dosing Weight  Height (cm): 180.3 (30 Dec 2024 12:01)  Weight (kg): 85.1 (30 Dec 2024 12:01)  BMI (kg/m2): 26.2 (30 Dec 2024 12:01)  BSA (m2): 2.05 (30 Dec 2024 12:01)  Weights:   Daily Weight in k.8 (), Weight in k.2 (), Weight in k.7 (), Weight in k.7 (), Weight in k.7 (), Weight in k (), Weight in k.2 ()     MEDICATIONS:  MEDICATIONS  (STANDING):  acetylcysteine 10%  Inhalation 4 milliLiter(s) Inhalation every 6 hours  acyclovir IVPB 430 milliGRAM(s) IV Intermittent <User Schedule>  albuterol    0.083% 2.5 milliGRAM(s) Nebulizer every 6 hours  ascorbic acid 500 milliGRAM(s) Oral daily  aspirin  chewable 81 milliGRAM(s) Oral daily  atorvastatin 80 milliGRAM(s) Oral at bedtime  bisacodyl Suppository 10 milliGRAM(s) Rectal once  chlorhexidine 0.12% Liquid 15 milliLiter(s) Oral Mucosa every 12 hours  chlorhexidine 2% Cloths 1 Application(s) Topical daily  chlorhexidine 4% Liquid 1 Application(s) Topical <User Schedule>  dextrose 50% Injectable 50 milliLiter(s) IV Push every 15 minutes  DOBUTamine Infusion 2 MICROgram(s)/kG/Min (5.11 mL/Hr) IV Continuous <Continuous>  epoetin ignacia (EPOGEN) Injectable 53288 Unit(s) IV Push <User Schedule>  gabapentin 100 milliGRAM(s) Oral every 12 hours  heparin   Injectable 5000 Unit(s) SubCutaneous every 8 hours  insulin lispro (ADMELOG) corrective regimen sliding scale   SubCutaneous every 6 hours  lidocaine   4% Patch 1 Patch Transdermal daily  linezolid  IVPB      linezolid  IVPB 600 milliGRAM(s) IV Intermittent every 12 hours  melatonin 5 milliGRAM(s) Oral at bedtime  meropenem  IVPB 500 milliGRAM(s) IV Intermittent every 24 hours  meropenem  IVPB      methocarbamol 500 milliGRAM(s) Oral every 8 hours  mirtazapine 7.5 milliGRAM(s) Oral daily  Nephro-elicia 1 Tablet(s) Oral daily  pantoprazole  Injectable 40 milliGRAM(s) IV Push daily  polyethylene glycol 3350 17 Gram(s) Oral two times a day  sodium chloride 0.65% Nasal 1 Spray(s) Both Nostrils every 12 hours  sodium chloride 0.9%. 1000 milliLiter(s) (10 mL/Hr) IV Continuous <Continuous>  theophylline ER Capsule 100 milliGRAM(s) Oral <User Schedule>  traZODone 50 milliGRAM(s) Oral at bedtime    MEDICATIONS  (PRN):  acetaminophen     Tablet .. 650 milliGRAM(s) Oral every 6 hours PRN Mild Pain (1 - 3)  benzocaine 20% Spray 1 Spray(s) Topical every 6 hours PRN throat pain  lidocaine/prilocaine Cream 1 Application(s) Topical daily PRN pre-HD  oxyCODONE    IR 5 milliGRAM(s) Oral every 6 hours PRN Moderate Pain (4 - 6)  oxyCODONE    IR 10 milliGRAM(s) Oral every 6 hours PRN Severe Pain (7 - 10)  sodium chloride 0.9% lock flush 10 milliLiter(s) IV Push every 1 hour PRN Pre/post blood products, medications, blood draw, and to maintain line patency    NUTRITIONALLY PERTINENT LABS:   Na132 mmol/L[L] Glu 101 mg/dL[H] K+ 4.2 mmol/L Cr  5.51 mg/dL[H] BUN 32 mg/dL[H]  Phos 4.4 mg/dL  Alb 2.6 g/dL[L] ALT 11 U/L AST 20 U/L Alkaline Phosphatase 118 U/L    A1C with Estimated Average Glucose Result: 5.6 % (24 @ 06:50)    Finger Sticks:  POCT Blood Glucose.: 134 mg/dL ( @ 05:36)  POCT Blood Glucose.: 109 mg/dL ( @ 23:55)  POCT Blood Glucose.: 88 mg/dL ( @ 17:58)  POCT Blood Glucose.: 103 mg/dL ( @ 12:11)      NUTRITIONALLY PERTINENT MEDICATIONS/LABS:  [x] Reviewed  [] Relevant notes on medications/labs:    EDEMA:  [x] Reviewed  [] Relevant notes:    GI/ I&O:  [x] Reviewed  [] Relevant notes:  [] Other:    SKIN:   [] No pressure injuries documented, per nursing flowsheet  [] Pressure injury previously noted  [] Change in pressure injury documentation:  [] Other:    ESTIMATED NEEDS:  Energy:  9149-0709 kcal/day (30-35 kcal/kg)  Protein:  109-140 g/day (1.4-1.8 g/kg)  Fluid:   ml/day or [x] defer to team  Based on: IBW 172lb/78kg with consideration for HD, skin integrity     NUTRITION DIAGNOSIS:  [x] Prior Dx:  Inadequate energy intake, Increased Nutrient Needs (protein-energy, micronutrients)  [] New Dx:    EDUCATION:  [] Yes:  [] Not appropriate/warranted    NUTRITION CARE PLAN:  1. Diet: Continue Vital 1.5 but increase dosage to better align with pt nutritional needs  2. Supplements:  3. Multivitamin/mineral supplementation:  4:     [] Achieved - Continue current nutrition intervention(s)  [] Current medical condition precludes nutrition intervention at this time.    MONITORING AND EVALUATION:   RD remains available upon request and will follow up per protocol.    Kaelyn Zhang MS, RD, CDN / Teams NUTRITION FOLLOW UP NOTE    PATIENT SEEN FOR: follow up     SOURCE: [] Patient  [x] Current Medical Record  [x] Nursing  [] Family/support person at bedside  [x] Patient unavailable/inappropriate  [] Other:    CHART REVIEWED/EVENTS NOTED.  [] No changes to nutrition care plan to note  [x] Nutrition Status:  -s/p CABG x 3 on 24  -: Intubated for hypoxia & left lung white out   - tracheostomy tube placement   -ESRD - tolerating iHD     DIET ORDER:   Diet, NPO with Tube Feed:   Tube Feeding Modality: Nasogastric  Vital 1.5 Jeremias (VITAL1.5RTH)  Total Volume for 24 Hours (mL): 1440  Continuous  Starting Tube Feed Rate {mL per Hour}: 40  Until Goal Tube Feed Rate (mL per Hour): 60  Tube Feed Duration (in Hours): 24  Tube Feed Start Time: 15:28  No Carb Prosource (1pkg = 15gms Protein)     Qty per Day:  1  Banatrol TF     Qty per Day:  2 (25)  Diet, NPO after Midnight:      NPO Start Date: 15-Aquilino-2025,   NPO Start Time: 23:59 (01-15-25)    CURRENT DIET ORDER IS:  [] Appropriate:  [] Inadequate:  [x] Other: see below for recommendations    NUTRITION INTAKE/PROVISION:  [] PO:  [x] Enteral Nutrition:  -->Providing at 100% provision with prosource 2250kcal (29kcal/kg) and 113g protein (1.4g/kg)  -->EN provision x past 5 days on Vital 1.5: ~1614kcal/day (~69% EER) on average per nursing documentation  [] Parenteral Nutrition:    ANTHROPOMETRICS:  Drug Dosing Weight  Height (cm): 180.3 (30 Dec 2024 12:01)  Weight (kg): 85.1 (30 Dec 2024 12:01)  BMI (kg/m2): 26.2 (30 Dec 2024 12:)  BSA (m2): 2.05 (30 Dec 2024 12:01)  Weights:   Daily Weight in k.8 (), Weight in k.2 (), Weight in k.7 (), Weight in k.7 (), Weight in k.7 (), Weight in k (), Weight in k.2 (-), Weight in k.6 (-)   -->Weight fluctuations noted, likely due to fluid shifts and differences in scales.   RD will continue to monitor trends.     MEDICATIONS:  MEDICATIONS  (STANDING):  acetylcysteine 10%  Inhalation 4 milliLiter(s) Inhalation every 6 hours  acyclovir IVPB 430 milliGRAM(s) IV Intermittent <User Schedule>  albuterol    0.083% 2.5 milliGRAM(s) Nebulizer every 6 hours  ascorbic acid 500 milliGRAM(s) Oral daily  aspirin  chewable 81 milliGRAM(s) Oral daily  atorvastatin 80 milliGRAM(s) Oral at bedtime  bisacodyl Suppository 10 milliGRAM(s) Rectal once  chlorhexidine 0.12% Liquid 15 milliLiter(s) Oral Mucosa every 12 hours  chlorhexidine 2% Cloths 1 Application(s) Topical daily  chlorhexidine 4% Liquid 1 Application(s) Topical <User Schedule>  dextrose 50% Injectable 50 milliLiter(s) IV Push every 15 minutes  DOBUTamine Infusion 2 MICROgram(s)/kG/Min (5.11 mL/Hr) IV Continuous <Continuous>  epoetin ignacia (EPOGEN) Injectable 05797 Unit(s) IV Push <User Schedule>  gabapentin 100 milliGRAM(s) Oral every 12 hours  heparin   Injectable 5000 Unit(s) SubCutaneous every 8 hours  insulin lispro (ADMELOG) corrective regimen sliding scale   SubCutaneous every 6 hours  lidocaine   4% Patch 1 Patch Transdermal daily  linezolid  IVPB      linezolid  IVPB 600 milliGRAM(s) IV Intermittent every 12 hours  melatonin 5 milliGRAM(s) Oral at bedtime  meropenem  IVPB 500 milliGRAM(s) IV Intermittent every 24 hours  meropenem  IVPB      methocarbamol 500 milliGRAM(s) Oral every 8 hours  mirtazapine 7.5 milliGRAM(s) Oral daily  Nephro-elicia 1 Tablet(s) Oral daily  pantoprazole  Injectable 40 milliGRAM(s) IV Push daily  polyethylene glycol 3350 17 Gram(s) Oral two times a day  sodium chloride 0.65% Nasal 1 Spray(s) Both Nostrils every 12 hours  sodium chloride 0.9%. 1000 milliLiter(s) (10 mL/Hr) IV Continuous <Continuous>  theophylline ER Capsule 100 milliGRAM(s) Oral <User Schedule>  traZODone 50 milliGRAM(s) Oral at bedtime    MEDICATIONS  (PRN):  acetaminophen     Tablet .. 650 milliGRAM(s) Oral every 6 hours PRN Mild Pain (1 - 3)  benzocaine 20% Spray 1 Spray(s) Topical every 6 hours PRN throat pain  lidocaine/prilocaine Cream 1 Application(s) Topical daily PRN pre-HD  oxyCODONE    IR 5 milliGRAM(s) Oral every 6 hours PRN Moderate Pain (4 - 6)  oxyCODONE    IR 10 milliGRAM(s) Oral every 6 hours PRN Severe Pain (7 - 10)  sodium chloride 0.9% lock flush 10 milliLiter(s) IV Push every 1 hour PRN Pre/post blood products, medications, blood draw, and to maintain line patency    NUTRITIONALLY PERTINENT LABS:   Na132 mmol/L[L] Glu 101 mg/dL[H] K+ 4.2 mmol/L Cr  5.51 mg/dL[H] BUN 32 mg/dL[H]  Phos 4.4 mg/dL  Alb 2.6 g/dL[L] ALT 11 U/L AST 20 U/L Alkaline Phosphatase 118 U/L    A1C with Estimated Average Glucose Result: 5.6 % (24 @ 06:50)    Finger Sticks:  POCT Blood Glucose.: 134 mg/dL ( @ 05:36)  POCT Blood Glucose.: 109 mg/dL ( @ 23:55)  POCT Blood Glucose.: 88 mg/dL ( @ 17:58)  POCT Blood Glucose.: 103 mg/dL ( @ 12:11)    NUTRITIONALLY PERTINENT MEDICATIONS/LABS:  [x] Reviewed  [x] Relevant notes on medications/labs:  -hyponatremia noted  -blood glucose being monitored and insulin regimen noted to maintain glycemic control     EDEMA:  [x] Reviewed  [] Relevant notes:    GI/ I&O:  [x] Reviewed  [] Relevant notes:  [] Other:    SKIN:   [x] No pressure injuries documented, per nursing flowsheet  [] Pressure injury previously noted  [] Change in pressure injury documentation:  [x] Other: mid sternal incision     ESTIMATED NEEDS:  Energy:  8852-4415 kcal/day (30-35 kcal/kg)  Protein:  109-140 g/day (1.4-1.8 g/kg)  Fluid:   ml/day or [x] defer to team  Based on: IBW 172lb/78kg with consideration for HD, skin integrity     NUTRITION DIAGNOSIS:  [x] Prior Dx:  Inadequate energy intake, Increased Nutrient Needs (protein-energy, micronutrients)  [] New Dx:    EDUCATION:  [] Yes:  [x] Not appropriate/warranted    NUTRITION CARE PLAN:  1. Diet: Continue Vital 1.5 but increase dosage to better align with pt nutritional needs. Goal of 65ml/hr x 24hr and add Prosource TF Free 1x/day. At goal provides 1560ml, 2430kcal (31kcal/kg), 121g protein (1.5g/kg), 1192ml free water. defer free water flush to team.   2. continue vitamin/mineral regimen as ordered     [] Achieved - Continue current nutrition intervention(s)  [] Current medical condition precludes nutrition intervention at this time.    MONITORING AND EVALUATION:   RD remains available upon request and will follow up per protocol.    Kaelyn Zhang, MS, RD, CDN / Teams

## 2025-01-30 LAB
ALBUMIN SERPL ELPH-MCNC: 2.7 G/DL — LOW (ref 3.3–5)
ALP SERPL-CCNC: 129 U/L — HIGH (ref 40–120)
ALT FLD-CCNC: 14 U/L — SIGNIFICANT CHANGE UP (ref 10–45)
ANION GAP SERPL CALC-SCNC: 12 MMOL/L — SIGNIFICANT CHANGE UP (ref 5–17)
AST SERPL-CCNC: 21 U/L — SIGNIFICANT CHANGE UP (ref 10–40)
BASOPHILS # BLD AUTO: 0.05 K/UL — SIGNIFICANT CHANGE UP (ref 0–0.2)
BASOPHILS NFR BLD AUTO: 0.6 % — SIGNIFICANT CHANGE UP (ref 0–2)
BILIRUB SERPL-MCNC: 0.5 MG/DL — SIGNIFICANT CHANGE UP (ref 0.2–1.2)
BUN SERPL-MCNC: 22 MG/DL — SIGNIFICANT CHANGE UP (ref 7–23)
CALCIUM SERPL-MCNC: 8.4 MG/DL — SIGNIFICANT CHANGE UP (ref 8.4–10.5)
CHLORIDE SERPL-SCNC: 95 MMOL/L — LOW (ref 96–108)
CO2 SERPL-SCNC: 25 MMOL/L — SIGNIFICANT CHANGE UP (ref 22–31)
CREAT SERPL-MCNC: 3.99 MG/DL — HIGH (ref 0.5–1.3)
CULTURE RESULTS: ABNORMAL
EGFR: 17 ML/MIN/1.73M2 — LOW
EGFR: 17 ML/MIN/1.73M2 — LOW
EOSINOPHIL # BLD AUTO: 0.52 K/UL — HIGH (ref 0–0.5)
EOSINOPHIL NFR BLD AUTO: 5.8 % — SIGNIFICANT CHANGE UP (ref 0–6)
GALACTOMANNAN AG SERPL-ACNC: 0.34 INDEX — SIGNIFICANT CHANGE UP (ref 0–0.49)
GLUCOSE SERPL-MCNC: 84 MG/DL — SIGNIFICANT CHANGE UP (ref 70–99)
HCT VFR BLD CALC: 29.8 % — LOW (ref 39–50)
HHV SPEC CULT: SIGNIFICANT CHANGE UP
HSV1 AG SPEC QL: SIGNIFICANT CHANGE UP
HSV2 AG SPEC QL: SIGNIFICANT CHANGE UP
IMM GRANULOCYTES NFR BLD AUTO: 0.4 % — SIGNIFICANT CHANGE UP (ref 0–0.9)
LYMPHOCYTES # BLD AUTO: 0.52 K/UL — LOW (ref 1–3.3)
LYMPHOCYTES # BLD AUTO: 5.8 % — LOW (ref 13–44)
MAGNESIUM SERPL-MCNC: 2.2 MG/DL — SIGNIFICANT CHANGE UP (ref 1.6–2.6)
MCHC RBC-ENTMCNC: 30.9 G/DL — LOW (ref 32–36)
MCV RBC AUTO: 92.5 FL — SIGNIFICANT CHANGE UP (ref 80–100)
MONOCYTES # BLD AUTO: 1.01 K/UL — HIGH (ref 0–0.9)
MONOCYTES NFR BLD AUTO: 11.3 % — SIGNIFICANT CHANGE UP (ref 2–14)
NEUTROPHILS # BLD AUTO: 6.82 K/UL — SIGNIFICANT CHANGE UP (ref 1.8–7.4)
NEUTROPHILS NFR BLD AUTO: 76.1 % — SIGNIFICANT CHANGE UP (ref 43–77)
NRBC # BLD: 0 /100 WBCS — SIGNIFICANT CHANGE UP (ref 0–0)
NRBC BLD-RTO: 0 /100 WBCS — SIGNIFICANT CHANGE UP (ref 0–0)
PHOSPHATE SERPL-MCNC: 3.4 MG/DL — SIGNIFICANT CHANGE UP (ref 2.5–4.5)
PLATELET # BLD AUTO: 315 K/UL — SIGNIFICANT CHANGE UP (ref 150–400)
POTASSIUM SERPL-MCNC: 4.3 MMOL/L — SIGNIFICANT CHANGE UP (ref 3.5–5.3)
POTASSIUM SERPL-SCNC: 4.3 MMOL/L — SIGNIFICANT CHANGE UP (ref 3.5–5.3)
PROT SERPL-MCNC: 6.2 G/DL — SIGNIFICANT CHANGE UP (ref 6–8.3)
RBC # BLD: 3.22 M/UL — LOW (ref 4.2–5.8)
RBC # FLD: 19.5 % — HIGH (ref 10.3–14.5)
SODIUM SERPL-SCNC: 132 MMOL/L — LOW (ref 135–145)
SPECIMEN SOURCE: SIGNIFICANT CHANGE UP
WBC # BLD: 8.96 K/UL — SIGNIFICANT CHANGE UP (ref 3.8–10.5)

## 2025-01-30 PROCEDURE — 99152 MOD SED SAME PHYS/QHP 5/>YRS: CPT

## 2025-01-30 PROCEDURE — 99232 SBSQ HOSP IP/OBS MODERATE 35: CPT

## 2025-01-30 PROCEDURE — G0545: CPT

## 2025-01-30 PROCEDURE — 31646 BRNCHSC W/THER ASPIR SBSQ: CPT

## 2025-01-30 PROCEDURE — 99231 SBSQ HOSP IP/OBS SF/LOW 25: CPT

## 2025-01-30 PROCEDURE — 71045 X-RAY EXAM CHEST 1 VIEW: CPT | Mod: 26,76

## 2025-01-30 PROCEDURE — 99233 SBSQ HOSP IP/OBS HIGH 50: CPT | Mod: 25

## 2025-01-30 RX ORDER — MIDAZOLAM IN 0.9 % SOD.CHLORID 1 MG/ML
1 PLASTIC BAG, INJECTION (ML) INTRAVENOUS ONCE
Refills: 0 | Status: DISCONTINUED | OUTPATIENT
Start: 2025-01-30 | End: 2025-01-30

## 2025-01-30 RX ORDER — MEROPENEM 1 G/30ML
500 INJECTION INTRAVENOUS EVERY 24 HOURS
Refills: 0 | Status: DISCONTINUED | OUTPATIENT
Start: 2025-01-30 | End: 2025-02-03

## 2025-01-30 RX ORDER — KETAMINE HCL IN 0.9 % NACL 50 MG/5 ML
50 SYRINGE (ML) INTRAVENOUS ONCE
Refills: 0 | Status: DISCONTINUED | OUTPATIENT
Start: 2025-01-30 | End: 2025-01-30

## 2025-01-30 RX ADMIN — Medication 108.6 MILLIGRAM(S): at 21:13

## 2025-01-30 RX ADMIN — Medication 100 MILLIGRAM(S): at 08:45

## 2025-01-30 RX ADMIN — ACETYLCYSTEINE 4 MILLILITER(S): 200 INHALANT RESPIRATORY (INHALATION) at 17:52

## 2025-01-30 RX ADMIN — LIDOCAINE HYDROCHLORIDE 1 PATCH: 20 JELLY TOPICAL at 11:40

## 2025-01-30 RX ADMIN — Medication 1 SPRAY(S): at 17:19

## 2025-01-30 RX ADMIN — OXYCODONE HYDROCHLORIDE 10 MILLIGRAM(S): 30 TABLET ORAL at 06:59

## 2025-01-30 RX ADMIN — OXYCODONE HYDROCHLORIDE 5 MILLIGRAM(S): 30 TABLET ORAL at 23:00

## 2025-01-30 RX ADMIN — POLYETHYLENE GLYCOL 3350 17 GRAM(S): 17 POWDER, FOR SOLUTION ORAL at 05:00

## 2025-01-30 RX ADMIN — ACETYLCYSTEINE 4 MILLILITER(S): 200 INHALANT RESPIRATORY (INHALATION) at 23:04

## 2025-01-30 RX ADMIN — Medication 15 MILLILITER(S): at 05:00

## 2025-01-30 RX ADMIN — Medication 1 MILLIGRAM(S): at 15:41

## 2025-01-30 RX ADMIN — Medication 40 MILLIGRAM(S): at 11:39

## 2025-01-30 RX ADMIN — Medication 2.5 MILLIGRAM(S): at 11:12

## 2025-01-30 RX ADMIN — ACETYLCYSTEINE 4 MILLILITER(S): 200 INHALANT RESPIRATORY (INHALATION) at 11:12

## 2025-01-30 RX ADMIN — LINEZOLID 300 MILLIGRAM(S): 2 INJECTION, SOLUTION INTRAVENOUS at 13:49

## 2025-01-30 RX ADMIN — Medication 650 MILLIGRAM(S): at 09:45

## 2025-01-30 RX ADMIN — LIDOCAINE HYDROCHLORIDE 1 PATCH: 20 JELLY TOPICAL at 23:30

## 2025-01-30 RX ADMIN — MEROPENEM 100 MILLIGRAM(S): 1 INJECTION INTRAVENOUS at 06:46

## 2025-01-30 RX ADMIN — Medication 1 APPLICATION(S): at 05:00

## 2025-01-30 RX ADMIN — Medication 50 MILLIGRAM(S): at 15:36

## 2025-01-30 RX ADMIN — Medication 81 MILLIGRAM(S): at 11:38

## 2025-01-30 RX ADMIN — LINEZOLID 300 MILLIGRAM(S): 2 INJECTION, SOLUTION INTRAVENOUS at 00:22

## 2025-01-30 RX ADMIN — HEPARIN SODIUM 5000 UNIT(S): 1000 INJECTION INTRAVENOUS; SUBCUTANEOUS at 21:13

## 2025-01-30 RX ADMIN — OXYCODONE HYDROCHLORIDE 5 MILLIGRAM(S): 30 TABLET ORAL at 11:30

## 2025-01-30 RX ADMIN — OXYCODONE HYDROCHLORIDE 5 MILLIGRAM(S): 30 TABLET ORAL at 23:30

## 2025-01-30 RX ADMIN — HEPARIN SODIUM 5000 UNIT(S): 1000 INJECTION INTRAVENOUS; SUBCUTANEOUS at 05:00

## 2025-01-30 RX ADMIN — Medication 2.5 MILLIGRAM(S): at 05:10

## 2025-01-30 RX ADMIN — Medication 650 MILLIGRAM(S): at 10:15

## 2025-01-30 RX ADMIN — OXYCODONE HYDROCHLORIDE 10 MILLIGRAM(S): 30 TABLET ORAL at 01:30

## 2025-01-30 RX ADMIN — Medication 2.5 MILLIGRAM(S): at 17:53

## 2025-01-30 RX ADMIN — GABAPENTIN 100 MILLIGRAM(S): 400 CAPSULE ORAL at 05:00

## 2025-01-30 RX ADMIN — OXYCODONE HYDROCHLORIDE 5 MILLIGRAM(S): 30 TABLET ORAL at 05:00

## 2025-01-30 RX ADMIN — MIRTAZAPINE 7.5 MILLIGRAM(S): 30 TABLET, FILM COATED ORAL at 11:39

## 2025-01-30 RX ADMIN — METHOCARBAMOL 500 MILLIGRAM(S): 500 TABLET, FILM COATED ORAL at 13:53

## 2025-01-30 RX ADMIN — OXYCODONE HYDROCHLORIDE 10 MILLIGRAM(S): 30 TABLET ORAL at 07:29

## 2025-01-30 RX ADMIN — Medication 15 MILLILITER(S): at 17:19

## 2025-01-30 RX ADMIN — OXYCODONE HYDROCHLORIDE 10 MILLIGRAM(S): 30 TABLET ORAL at 00:27

## 2025-01-30 RX ADMIN — Medication 500 MILLIGRAM(S): at 11:39

## 2025-01-30 RX ADMIN — METHOCARBAMOL 500 MILLIGRAM(S): 500 TABLET, FILM COATED ORAL at 05:00

## 2025-01-30 RX ADMIN — Medication 5 MILLIGRAM(S): at 21:14

## 2025-01-30 RX ADMIN — OXYCODONE HYDROCHLORIDE 10 MILLIGRAM(S): 30 TABLET ORAL at 14:15

## 2025-01-30 RX ADMIN — METHOCARBAMOL 500 MILLIGRAM(S): 500 TABLET, FILM COATED ORAL at 21:13

## 2025-01-30 RX ADMIN — Medication 100 MILLIGRAM(S): at 20:14

## 2025-01-30 RX ADMIN — Medication 50 MILLIGRAM(S): at 21:13

## 2025-01-30 RX ADMIN — GABAPENTIN 100 MILLIGRAM(S): 400 CAPSULE ORAL at 17:18

## 2025-01-30 RX ADMIN — Medication 1 TABLET(S): at 11:38

## 2025-01-30 RX ADMIN — Medication 2.5 MILLIGRAM(S): at 23:04

## 2025-01-30 RX ADMIN — OXYCODONE HYDROCHLORIDE 10 MILLIGRAM(S): 30 TABLET ORAL at 13:45

## 2025-01-30 RX ADMIN — OXYCODONE HYDROCHLORIDE 5 MILLIGRAM(S): 30 TABLET ORAL at 12:00

## 2025-01-30 RX ADMIN — ACETYLCYSTEINE 4 MILLILITER(S): 200 INHALANT RESPIRATORY (INHALATION) at 05:10

## 2025-01-30 RX ADMIN — LIDOCAINE HYDROCHLORIDE 1 PATCH: 20 JELLY TOPICAL at 19:42

## 2025-01-30 RX ADMIN — OXYCODONE HYDROCHLORIDE 5 MILLIGRAM(S): 30 TABLET ORAL at 05:30

## 2025-01-30 RX ADMIN — ATORVASTATIN CALCIUM 80 MILLIGRAM(S): 80 TABLET, FILM COATED ORAL at 21:13

## 2025-01-30 RX ADMIN — Medication 1 APPLICATION(S): at 21:31

## 2025-01-30 NOTE — PROGRESS NOTE ADULT - SUBJECTIVE AND OBJECTIVE BOX
TRANSPLANT PULMONOLOGY    CHIEF COMPLAINT: Patient is a 54y old  Male who presents with a chief complaint of CAD s/p CABG (30 Jan 2025 06:01)      [x] INITIAL CONSULT, H&P, FAMILY HISTORY and PAST MEDICAL AND SURGICAL HISTORY REVIEWED    OVERNIGHT EVENTS or CHANGES TO HPI: none    ========================REVIEW OF SYSTEMS========================  CONSTITUTIONAL:   CARDIOVASCULAR:  PULMONARY:  [] REMAINING REVIEW OF SYSTEMS NEGATIVE  [x] UNABLE TO OBTAIN REVIEW OF SYSTEMS DUE TO tracheostomy    ========================MEDICATIONS=============================  MEDICATIONS  (STANDING):  acetylcysteine 10%  Inhalation 4 milliLiter(s) Inhalation every 6 hours  acyclovir IVPB 430 milliGRAM(s) IV Intermittent <User Schedule>  albuterol    0.083% 2.5 milliGRAM(s) Nebulizer every 6 hours  ascorbic acid 500 milliGRAM(s) Oral daily  aspirin  chewable 81 milliGRAM(s) Oral daily  atorvastatin 80 milliGRAM(s) Oral at bedtime  chlorhexidine 0.12% Liquid 15 milliLiter(s) Oral Mucosa every 12 hours  chlorhexidine 2% Cloths 1 Application(s) Topical daily  chlorhexidine 4% Liquid 1 Application(s) Topical <User Schedule>  dextrose 50% Injectable 50 milliLiter(s) IV Push every 15 minutes  DOBUTamine Infusion 2 MICROgram(s)/kG/Min (5.11 mL/Hr) IV Continuous <Continuous>  epoetin ignacia (EPOGEN) Injectable 82781 Unit(s) IV Push <User Schedule>  gabapentin 100 milliGRAM(s) Oral every 12 hours  heparin   Injectable 5000 Unit(s) SubCutaneous every 8 hours  insulin lispro (ADMELOG) corrective regimen sliding scale   SubCutaneous every 6 hours  lidocaine   4% Patch 1 Patch Transdermal daily  linezolid  IVPB      linezolid  IVPB 600 milliGRAM(s) IV Intermittent every 12 hours  melatonin 5 milliGRAM(s) Oral at bedtime  meropenem  IVPB 500 milliGRAM(s) IV Intermittent every 24 hours  methocarbamol 500 milliGRAM(s) Oral every 8 hours  mirtazapine 7.5 milliGRAM(s) Oral daily  Nephro-elicia 1 Tablet(s) Oral daily  pantoprazole  Injectable 40 milliGRAM(s) IV Push daily  polyethylene glycol 3350 17 Gram(s) Oral two times a day  sodium chloride 0.65% Nasal 1 Spray(s) Both Nostrils every 12 hours  sodium chloride 0.9%. 1000 milliLiter(s) (10 mL/Hr) IV Continuous <Continuous>  theophylline ER Capsule 100 milliGRAM(s) Oral <User Schedule>  traZODone 50 milliGRAM(s) Oral at bedtime      MEDICATIONS  (PRN):  acetaminophen     Tablet .. 650 milliGRAM(s) Oral every 6 hours PRN Mild Pain (1 - 3)  lidocaine/prilocaine Cream 1 Application(s) Topical daily PRN pre-HD  oxyCODONE    IR 5 milliGRAM(s) Oral every 6 hours PRN Moderate Pain (4 - 6)  oxyCODONE    IR 10 milliGRAM(s) Oral every 6 hours PRN Severe Pain (7 - 10)  sodium chloride 0.9% lock flush 10 milliLiter(s) IV Push every 1 hour PRN Pre/post blood products, medications, blood draw, and to maintain line patency      ========================PHYSICAL EXAM============================    VITALS: ICU Vital Signs Last 24 Hrs  T(C): 37.1 (30 Jan 2025 16:00), Max: 37.2 (30 Jan 2025 08:00)  T(F): 98.7 (30 Jan 2025 16:00), Max: 98.9 (30 Jan 2025 08:00)  HR: 88 (30 Jan 2025 16:00) (64 - 94)  BP: 166/65 (30 Jan 2025 16:00) (126/66 - 181/81)  BP(mean): 108 (30 Jan 2025 16:00) (86 - 118)  ABP: --  ABP(mean): --  RR: 9 (30 Jan 2025 16:00) (9 - 33)  SpO2: 98% (30 Jan 2025 16:00) (92% - 100%)    O2 Parameters below as of 30 Jan 2025 16:00  Patient On (Oxygen Delivery Method): [facility]  O2 Flow (L/min): 40  O2 Concentration (%): 40        INTAKE and OUTPUT: I&O's Summary    29 Jan 2025 07:01  -  30 Jan 2025 07:00  --------------------------------------------------------  IN: 3632.3 mL / OUT: 1800 mL / NET: 1832.3 mL    30 Jan 2025 07:01  -  30 Jan 2025 16:22  --------------------------------------------------------  IN: 495.9 mL / OUT: 0 mL / NET: 495.9 mL        VENTILATOR SETTINGS: Mode: [facility]  FiO2: 40      PHYSICAL EXAM:  General: Awake, alert, oriented X 3.   Respiratory: +tracheostomy connected to HFNC, rhonci noted bilaterally  Cardiovascular: S1 S2 normal. No murmurs  Abdomen: Soft, non-tender, non-distended.  Extremities: Warm to touch. No pedal edema.   Neurological: non focal exam    ========================LABORATORY RESULTS AND IMAGING=============                        9.2    8.96  )-----------( 315      ( 30 Jan 2025 00:24 )             29.8                                                    01-30    132[L]  |  95[L]  |  22  ----------------------------<  84  4.3   |  25  |  3.99[H]    Ca    8.4      30 Jan 2025 00:24  Phos  3.4     01-30  Mg     2.2     01-30    TPro  6.2  /  Alb  2.7[L]  /  TBili  0.5  /  DBili  x   /  AST  21  /  ALT  14  /  AlkPhos  129[H]  01-30          Creatinine Trend: 3.99<--, 5.51<--, 4.57<--, 6.22<--, 5.22<--, 4.32<--        CT CHEST:     [] RADIOLOGY REVIEWED AND INTERPRETED BY ME      THANK YOU FOR ALLOWING US TO PARTICIPATE IN THE CARE OF THIS PATIENT     TRANSPLANT PULMONOLOGY    CHIEF COMPLAINT: Patient is a 54y old  Male who presents with a chief complaint of CAD s/p CABG (30 Jan 2025 06:01)      [x] INITIAL CONSULT, H&P, FAMILY HISTORY and PAST MEDICAL AND SURGICAL HISTORY REVIEWED    OVERNIGHT EVENTS or CHANGES TO HPI: none    ========================REVIEW OF SYSTEMS========================  CONSTITUTIONAL:   CARDIOVASCULAR:  PULMONARY:  [] REMAINING REVIEW OF SYSTEMS NEGATIVE  [x] UNABLE TO OBTAIN REVIEW OF SYSTEMS DUE TO tracheostomy    ========================MEDICATIONS=============================  MEDICATIONS  (STANDING):  acetylcysteine 10%  Inhalation 4 milliLiter(s) Inhalation every 6 hours  acyclovir IVPB 430 milliGRAM(s) IV Intermittent <User Schedule>  albuterol    0.083% 2.5 milliGRAM(s) Nebulizer every 6 hours  ascorbic acid 500 milliGRAM(s) Oral daily  aspirin  chewable 81 milliGRAM(s) Oral daily  atorvastatin 80 milliGRAM(s) Oral at bedtime  chlorhexidine 0.12% Liquid 15 milliLiter(s) Oral Mucosa every 12 hours  chlorhexidine 2% Cloths 1 Application(s) Topical daily  chlorhexidine 4% Liquid 1 Application(s) Topical <User Schedule>  dextrose 50% Injectable 50 milliLiter(s) IV Push every 15 minutes  DOBUTamine Infusion 2 MICROgram(s)/kG/Min (5.11 mL/Hr) IV Continuous <Continuous>  epoetin ignacia (EPOGEN) Injectable 93534 Unit(s) IV Push <User Schedule>  gabapentin 100 milliGRAM(s) Oral every 12 hours  heparin   Injectable 5000 Unit(s) SubCutaneous every 8 hours  insulin lispro (ADMELOG) corrective regimen sliding scale   SubCutaneous every 6 hours  lidocaine   4% Patch 1 Patch Transdermal daily  linezolid  IVPB      linezolid  IVPB 600 milliGRAM(s) IV Intermittent every 12 hours  melatonin 5 milliGRAM(s) Oral at bedtime  meropenem  IVPB 500 milliGRAM(s) IV Intermittent every 24 hours  methocarbamol 500 milliGRAM(s) Oral every 8 hours  mirtazapine 7.5 milliGRAM(s) Oral daily  Nephro-elicia 1 Tablet(s) Oral daily  pantoprazole  Injectable 40 milliGRAM(s) IV Push daily  polyethylene glycol 3350 17 Gram(s) Oral two times a day  sodium chloride 0.65% Nasal 1 Spray(s) Both Nostrils every 12 hours  sodium chloride 0.9%. 1000 milliLiter(s) (10 mL/Hr) IV Continuous <Continuous>  theophylline ER Capsule 100 milliGRAM(s) Oral <User Schedule>  traZODone 50 milliGRAM(s) Oral at bedtime      MEDICATIONS  (PRN):  acetaminophen     Tablet .. 650 milliGRAM(s) Oral every 6 hours PRN Mild Pain (1 - 3)  lidocaine/prilocaine Cream 1 Application(s) Topical daily PRN pre-HD  oxyCODONE    IR 5 milliGRAM(s) Oral every 6 hours PRN Moderate Pain (4 - 6)  oxyCODONE    IR 10 milliGRAM(s) Oral every 6 hours PRN Severe Pain (7 - 10)  sodium chloride 0.9% lock flush 10 milliLiter(s) IV Push every 1 hour PRN Pre/post blood products, medications, blood draw, and to maintain line patency      ========================PHYSICAL EXAM============================    VITALS: ICU Vital Signs Last 24 Hrs  T(C): 37.1 (30 Jan 2025 16:00), Max: 37.2 (30 Jan 2025 08:00)  T(F): 98.7 (30 Jan 2025 16:00), Max: 98.9 (30 Jan 2025 08:00)  HR: 88 (30 Jan 2025 16:00) (64 - 94)  BP: 166/65 (30 Jan 2025 16:00) (126/66 - 181/81)  BP(mean): 108 (30 Jan 2025 16:00) (86 - 118)  ABP: --  ABP(mean): --  RR: 9 (30 Jan 2025 16:00) (9 - 33)  SpO2: 98% (30 Jan 2025 16:00) (92% - 100%)    O2 Parameters below as of 30 Jan 2025 16:00  Patient On (Oxygen Delivery Method): [facility]  O2 Flow (L/min): 40  O2 Concentration (%): 40        INTAKE and OUTPUT: I&O's Summary    29 Jan 2025 07:01  -  30 Jan 2025 07:00  --------------------------------------------------------  IN: 3632.3 mL / OUT: 1800 mL / NET: 1832.3 mL    30 Jan 2025 07:01  -  30 Jan 2025 16:22  --------------------------------------------------------  IN: 495.9 mL / OUT: 0 mL / NET: 495.9 mL        VENTILATOR SETTINGS: Mode: [facility]  FiO2: 40      PHYSICAL EXAM:  General: Awake, alert, oriented X 3.   Respiratory: +tracheostomy connected to HFNC, decreased left sided BS.  Cardiovascular: S1 S2 normal. No murmurs  Abdomen: Soft, non-tender, non-distended.  Extremities: Warm to touch. No pedal edema.   Neurological: non focal exam    ========================LABORATORY RESULTS AND IMAGING=============                        9.2    8.96  )-----------( 315      ( 30 Jan 2025 00:24 )             29.8                                                    01-30    132[L]  |  95[L]  |  22  ----------------------------<  84  4.3   |  25  |  3.99[H]    Ca    8.4      30 Jan 2025 00:24  Phos  3.4     01-30  Mg     2.2     01-30    TPro  6.2  /  Alb  2.7[L]  /  TBili  0.5  /  DBili  x   /  AST  21  /  ALT  14  /  AlkPhos  129[H]  01-30          Creatinine Trend: 3.99<--, 5.51<--, 4.57<--, 6.22<--, 5.22<--, 4.32<--        CT CHEST:     [x] RADIOLOGY REVIEWED AND INTERPRETED BY ME

## 2025-01-30 NOTE — PROGRESS NOTE ADULT - ASSESSMENT
54M with PMH of HTN, HLD, ESRD on HD MWF via LUE AVF, ascites (requiring repeat paracenteses q4-6wks), NICM with moderate LV dysfunction w/ EF 35-40%() and CAD s/p atherectomy + SALLIE to D1(2024) presents to [facility] transferred from [facility]. Patient initially presented to [facility] ED with shortness of breath. Of note he was recently admitted from - for acute cholecystitis s/p cholecystectomy. In [facility] ED, pt was hypoxic to 86% on RA, trop 1.7K, EKG no new changes, CXR fluid overload. Admitted for AHRF 2/2 fluid overload. Pt received HD on , ,  and . DAPT was resumed, TTE showed improvement in LVEF to 40-45%. Stress test was abnormal with 1mm inferior STD, reversible ischemia in the inferior wall and fixed defects in the lateral, anterior and apical walls with post stress EF of 24%.     Pt was then transferred to [facility] for cardiac cath which showed pLAD 70% ISR, mLCx 70%, D1 severe dz. Patient now transferred to [facility] CTS Dr. Rivers for CABG eval.# CAD s/p NSTEMI  Hx CAD s/p PCI LAD   Presented with ADHF elevated troponins  Positive NST1-Cath- pLAD 70% ISR, mLCx 70%, D1 severe dz.   TTE EF 35% Severe TRWas on Plavix-p2y12- 321 been off Plavix since -POD#1-C3L free LIMA-LAD; SVG-Diag; CPP-qpnyWI-Pacvbtuzf on  Sinus rhythm,Amio for AF prophylaxis  -OOB off  Sinus rhythm   -POD#3-On /pressors-EF 25-30% RV failure with transaminitis-Sinus Pcodff83/03-POD#4-Was intubated for hypoxia-gradual hypotension on /pressors had IABP inserted this morning  -POD#5-s/p IABP insertion, sepsis/septic shock due to GNR/ECOLI HD cath placed   Bronched yesterday 1/3 - minimal secretions with rust-tinged sputum   ESBL-E Coli bacteremia on meropenam    - POD#7 Atrial Fib ,remains intubated weaning IABP 1:3,off pressors on CRRT  -IABP removed.Remains on vent-Alex 10ppm,off Levo- CVP 10 / MAP 71 / Hct 25.6% / Lactate 1.7-Atrial Fibrillation  -Intubated on Alex 10ppm, gtt, BP better-AF~~90's on Amio-had bronch still febrile Tmax [id number]/-Extubated awake alert on HFNC AF,on Dobutrex-CRRT,Cultures no growth    01/10-OOB on BiPAP Sinus Rhythm on -SR/ MAP 57/ CVP 10/  Hct 26.7 / Lact 1.4  -s/p EDU/DCCV-Sinus rhythm on -SR / MAP 52 / CVP 12/ Hct 25.8 / Lact 0.7-had bronch with BAL Left lung  -Sinus rhythm on -for repeat Bronch-SR / MAP 67 / CVP 11 / Hct 27.4% / Lact 0.8  -s/p Trach on  TTE EF 55%-SR / CVP 2 / MAP 63 / Hct 25.9% / Lactate 0.9  -Awake on Trach collar off  c/o buttock pain had bronch yesterday-BAL-Sinus rhythm  -Sinus rhythm on vent at night-BP stable may need to increase hydrallazine 25 mg q8  -TTE EF 60% still needs Vent support at night Bradycardic trial of Bryce now back on   -Awake alert Sinus rhythm BP elevated  remains on ,Nocturnal vent support  resume Hydralazine 25mg q8          # Acute on Chronic Systolic Heart Failure now improved  EF-35% ,severe TR  Had HD post op  GDMT post op  LVEF improved post bypass but now at 23-30%-BiV dysfunction on  now off pressors  -TTE EF 40%,trace effusion  ECG c/w pericarditis-was on colchicine   01/15-TTE EF 55%  -TTE EF 60%     Vascular evaluated  AVGraft /?steal causing RV failure-'' Very low suspicion of steal Sd and AVF causing current clinical state. ''   VA Duplex Hemodialysis Access, Left (25 @ 13:35) >   Arterial flow volume of 1301 mL/m, and the venous flow volume of  1492 mL/m are consistent with a normally functioning dialysis access  fistula.      # Ascites  Hx chronic ascites  Has drainage q4-6 weeks  Last drained -4600mL  ? ascites related to severe TR  Had wstspftsavpw-Aqgviiy-vs growth   CT Abdomen 01/10-Large volume ascites-consider paracentesis  Monitor      # ESRD  Now off CRRT transition to HD

## 2025-01-30 NOTE — PROGRESS NOTE ADULT - ATTENDING COMMENTS
Agree with above. 54 year old male with CAD s/p CABG with course c/b acute on chronic hypoxemic respiratory failure and recurrent left sided mucus plugging s/p tracheostomy. Saturating well on HFNC. Mucus plugging persists requiring frequent bronchoscopies for therapeutic aspiration. No evidence of dynamic airway collapse on bronchoscopy. Agree with ongoing pulmonary toilet and antibiotics for pneumonia. Patient seen 1/30/25. Agree with above. 54 year old male with CAD s/p CABG with course c/b acute hypoxemic respiratory failure and recurrent left sided mucus plugging s/p tracheostomy. Saturating well on HFNC. Mucus plugging persists requiring frequent bronchoscopies for therapeutic aspiration. No evidence of dynamic airway collapse on bronchoscopy. Agree with ongoing pulmonary toilet and antibiotics for pneumonia.

## 2025-01-30 NOTE — PROGRESS NOTE ADULT - ASSESSMENT
Assessment and Plan:   Assessment    A/P:  54M with PMH of HTN, HLD, ESRD on HD MWF via LUE AVF, ascites (requiring repeat paracenteses q4-6wks), NICM with moderate LV dysfunction w/ EF 35-40%(2023) and CAD s/p atherectomy + SALLIE to D1(4/11/2024) presents to [facility] transferred from [facility]. Patient initially presented to [facility] ED with shortness of breath. Of note he was recently admitted from 09/27-12/02 for acute cholecystitis s/p cholecystectomy. In [facility] ED, pt was hypoxic to 86% on RA, trop 1.7K, EKG no new changes, CXR fluid overload. Admitted for AHRF 2/2 fluid overload. Pt received HD on 12/18, 12/19, 12/20 and 12/22. DAPT was resumed, TTE showed improvement in LVEF to 40-45%. Stress test was abnormal with 1mm inferior STD, reversible ischemia in the inferior wall and fixed defects in the lateral, anterior and apical walls with post stress EF of 24%. Pt was then transferred to [facility] for cardiac cath which showed pLAD 70% ISR, mLCx 70%, D1 severe dz. Patient now transferred to [facility] CTS Dr. Rivers for CABG eval    Wound Consult requested to assist w/ management of  Sacral region/BL buttocks wound    Recommendations:   Sacral region continue Triad BID and PRN soiling      Moisturize intact skin w/ SWEEN cream BID  Nutrition Consult for optimization in pt w/ increased nutritional needs            encourage high quality protein, dedrick/ prosource, MVI & Vit C to promote wound healing  Continue turning and positioning w/ offloading assistive devices as per protocol       Continue w/ attends under pads and Pericare as per protocol  Waffle Cushion to chair when oob to chair  Continue w/ low air loss pressure redistribution bed surface   Pt will need Group 2 mattress on hospital bed and ROHO cushion for wheel chair upon discharge home  Care as per medicine, will follow w/ you  Upon discharge f/u as outpatient at [facility] [address] [facility] [phone]  Seen and D/w team & RN  Thank you for this consult,  LUISITO Manning-BC, [facility]  [phone]  Nights/ Weekends/ Holidays please call:  General Surgery Consult pager [phone]) for emergencies  Wound PT for multilayer leg wrapping or VAC issues ([phone])

## 2025-01-30 NOTE — PROCEDURE NOTE - NSICDXPROCEDURE_GEN_ALL_CORE_FT
PROCEDURES:  Follow-up bronchoscopy with therapeutic aspiration 31-Jan-2025 20:11:12  Dudley Patino   PROCEDURES:  Bronchoscopy, repeat, with therapeutic tracheobronchial tree aspiration 31-Jan-2025 20:14:48  Dudley Patino

## 2025-01-30 NOTE — PROGRESS NOTE ADULT - SUBJECTIVE AND OBJECTIVE BOX
Patient seen and examined at the bedside.    Remains critically ill on continuous ICU monitoring.      Brief Summary:  55 yo M with multiple medical problems including CAD s/p PCI in April 2024 s/p CABG x 3 on 12/30/24 1/2: Intubated for hypoxia & left lung white out s/p awake bronch with removal of copious mucopurulent secretions  1/4: s/p IABP insertion, sepsis/septic shock due to E coli   ESBL pneumonia, collapsed Left Lung, s/p multiple bronch   1/18 tracheostomy tube placement   1/22 VRE E. Faecium Bcx  1/24 +HSV PCR BAL  1/26 tissue cx Serratia   1/27-29 - intermittent bradycardia/junctional episodes with hypotension    24 Hour events:  Bradycardia again yesterday overnight despite starting Theophyline; Dobutamine restarted   TC during the day, PC at night   HD net -1L yesterday    Objective:  Vital Signs Last 24 Hrs  T(C): 36.6 (30 Jan 2025 04:00), Max: 37 (29 Jan 2025 18:30)  T(F): 97.9 (30 Jan 2025 04:00), Max: 98.6 (29 Jan 2025 18:30)  HR: 86 (30 Jan 2025 06:00) (59 - 94)  BP: 161/75 (30 Jan 2025 06:00) (126/66 - 200/90)  BP(mean): 108 (30 Jan 2025 06:00) (86 - 129)  RR: 23 (30 Jan 2025 06:00) (10 - 29)  SpO2: 97% (30 Jan 2025 06:00) (92% - 100%)    Parameters below as of 30 Jan 2025 05:31  Patient On (Oxygen Delivery Method): HFTC    Mode: standby  FiO2: 40    Physical Exam:  General: Alert and interactive,   Neurology: Oriented, following commands. ambulates  Respiratory: Breath sounds bilaterally, trach collar  CV: Sinus  Abdominal: Soft, Nontender  Extremities: Warm, well-perfused  Right arm tender, but stable  -------------------------------------------------------------------------------------------------------------------------------    Labs:                        9.2    8.96  )-----------( 315      ( 30 Jan 2025 00:24 )             29.8     01-30    132[L]  |  95[L]  |  22  ----------------------------<  84  4.3   |  25  |  3.99[H]    Ca    8.4      30 Jan 2025 00:24  Phos  3.4     01-30  Mg     2.2     01-30    TPro  6.2  /  Alb  2.7[L]  /  TBili  0.5  /  DBili  x   /  AST  21  /  ALT  14  /  AlkPhos  129[H]  01-30    LIVER FUNCTIONS - ( 30 Jan 2025 00:24 )  Alb: 2.7 g/dL / Pro: 6.2 g/dL / ALK PHOS: 129 U/L / ALT: 14 U/L / AST: 21 U/L / GGT: x         ------------------------------------------------------------------------------------------------------------------------------  Assessment:  55 yo M s/p CABG x 3 on 12/30/24    Postop acute respiratory failure  Acute blood loss anemia  Ascites    ESRD on iHD   E coli bacteremia 2/2 pneumonia  VRE E. Faecium   HSV infection  Tracheomalacia     Plan:  ***Neuro***  Postoperative acute pain control with Gabapentin, Robaxin, Tylenol, Oxycodone prn  Trazodone nightly  PT ongoing    ***Cardiovascular***  s/p CABG x 3  A fib s/p cardioversion  Intermittent bradycardia/junction with hypotension - started on Theophyline - EP eval   Dobutamine for chronotropy   TTE 1/27 LVEF 60%, mod RA pericardial effusion   ASA / Statin daily    ***Pulmonary***  Postoperative acute respiratory failure  Tracheostomy 1/18   s/p Bronchoscopy 1/18. 1/20, 1/21, 1/24  Secretion in Left lung improving  Restart Mucomyst albuterol  Left lung PNA, + e coli ESBL - cleared but now with VRE   Tolerates trach collar with high flow, keep on a vent at night  On HT 3% nebs and albuterol    ***GI***  On Tube feeds at goal, change to vital per nutrition consult  Protonix for stress ulcer prophylaxis.   Ascites: Last paracentesis 1/11    ***Renal***  ESRD - tolerating iHD (MWF)   Nephro-Lisette for renal support    ***ID***  E. coli PNA -  Meropenem. inhaled tobramycin completed  Meropenem re-started, amikacin single dose  VRE bacteremia, on Linezolid - repeat cultures   Monitor cultures  1/24 VRE BAL   1/24 Acyclovir for + HSV in BAL  1/26 serratia on tissue cx     ***Endocrine***  Hyperglycemia - Insulin sliding scale    ***Hematology***  Acute blood loss anemia  CBC, coags  Continue  Epogen.  Heparin SQ for DVT  Needs full AC s/p cardioversion      *** Lines ***  RUE Midline        Care plan discussed with the ICU care team.   Patient remains critical, at risk for life threatening decompensation.    I have spent 30 minutes providing critical care management to this patient.    By signing my name below, I, Baldemar Hernandez, attest that this documentation has been prepared under the direction and in the presence of Blaze Finch MD.  Electronically signed: Evelio Sevilla 01-30-25 @ 06:37    I, Blaze Finch MD,  personally performed the services described in this documentation. All medical record entries made by the scribe were at my direction and in my presence. I have reviewed the chart and agree that the record reflects my personal performance and is accurate and complete  Electronically signed: Blaze Finch MD. Patient seen and examined at the bedside.    Remains critically ill on continuous ICU monitoring.      Brief Summary:  53 yo M with multiple medical problems including CAD s/p PCI in April 2024 s/p CABG x 3 on 12/30/24 1/2: Intubated for hypoxia & left lung white out s/p awake bronch with removal of copious mucopurulent secretions  1/4: s/p IABP insertion, sepsis/septic shock due to E coli   ESBL pneumonia, collapsed Left Lung, s/p multiple bronch   1/18 tracheostomy tube placement   1/22 VRE E. Faecium Bcx  1/24 +HSV PCR BAL  1/26 tissue cx from back abscess - Serratia   1/27-29 - intermittent bradycardia/junctional episodes with hypotension    24 Hour events:  TC during the day, PC at night   No acute events   Tolerated iHD for -1L  Did not have recurrent bradyarrhythmia     Objective:  Vital Signs Last 24 Hrs  T(C): 36.6 (30 Jan 2025 04:00), Max: 37 (29 Jan 2025 18:30)  T(F): 97.9 (30 Jan 2025 04:00), Max: 98.6 (29 Jan 2025 18:30)  HR: 86 (30 Jan 2025 06:00) (59 - 94)  BP: 161/75 (30 Jan 2025 06:00) (126/66 - 200/90)  BP(mean): 108 (30 Jan 2025 06:00) (86 - 129)  RR: 23 (30 Jan 2025 06:00) (10 - 29)  SpO2: 97% (30 Jan 2025 06:00) (92% - 100%)    Parameters below as of 30 Jan 2025 05:31  Patient On (Oxygen Delivery Method): HFTC    Mode: standby  FiO2: 40    Physical Exam:  General: Alert and interactive,   Neurology: Oriented, following commands. ambulates  Respiratory: Breath sounds bilaterally, trach collar  CV: Sinus  Abdominal: Soft, Nontender  Extremities: Warm, well-perfused  Back: open abscess/wound to right paralumbar area, indurated, no fluctuance, no crepitus; sacral wound   Right arm tender, but non-infectious appearing  -------------------------------------------------------------------------------------------------------------------------------    Labs:                        9.2    8.96  )-----------( 315      ( 30 Jan 2025 00:24 )             29.8     01-30    132[L]  |  95[L]  |  22  ----------------------------<  84  4.3   |  25  |  3.99[H]    Ca    8.4      30 Jan 2025 00:24  Phos  3.4     01-30  Mg     2.2     01-30    TPro  6.2  /  Alb  2.7[L]  /  TBili  0.5  /  DBili  x   /  AST  21  /  ALT  14  /  AlkPhos  129[H]  01-30    LIVER FUNCTIONS - ( 30 Jan 2025 00:24 )  Alb: 2.7 g/dL / Pro: 6.2 g/dL / ALK PHOS: 129 U/L / ALT: 14 U/L / AST: 21 U/L / GGT: x         ------------------------------------------------------------------------------------------------------------------------------  Assessment:  53 yo M s/p CABG x 3 on 12/30/24    Postop acute respiratory failure  Acute blood loss anemia  Ascites    ESRD on iHD   E coli bacteremia 2/2 pneumonia  VRE E. Faecium   HSV infection  Tracheomalacia     Plan:  ***Neuro***  Postoperative acute pain control with Gabapentin, Robaxin, Tylenol, Oxycodone prn  Trazodone nightly  PT ongoing    ***Cardiovascular***  s/p CABG x 3  A fib s/p cardioversion  Intermittent bradycardia/junction with hypotension - started on Theophyline - EP eval   Dobutamine for chronotropy   TTE 1/27 LVEF 60%, mod RA pericardial effusion   ASA / Statin daily    ***Pulmonary***  Postoperative acute respiratory failure  Tracheostomy 1/18   s/p Bronchoscopy 1/18. 1/20, 1/21, 1/24, 1/28  Mucomyst albuterol  Left lung PNA, + e coli ESBL - cleared but now with VRE   Tolerates trach collar with high flow, keep on a vent at night  On HT 3% nebs and albuterol  Bronchoscopy today by pulmonology    ***GI***  On Tube feeds at goal, change to vital per nutrition consult  Protonix for stress ulcer prophylaxis.   Ascites: Last paracentesis 1/11    ***Renal***  ESRD - tolerating iHD (MWF)   Nephro-Lisette for renal support    ***ID***  Monitor cultures  1/28 BAL - commensal manjit   1/24 VRE BAL - Linezolid   1/24 Acyclovir for + HSV in BAL  1/26 serratia on tissue cx - Meropenem until 2/2  hx of ESBL pneumonia - cleared with meropenem     ***Endocrine***  Hyperglycemia - Insulin sliding scale    ***Hematology***  Acute blood loss anemia  CBC, coags  Continue  Epogen.  Heparin SQ for DVT  Needs full AC s/p cardioversion      *** Lines ***  RUE Midline  RUE PIV    Care plan discussed with the ICU care team.   Patient remains critical, at risk for life threatening decompensation.    I have spent 40 minutes providing critical care management to this patient.    By signing my name below, I, Baldemar Hernandez, attest that this documentation has been prepared under the direction and in the presence of Blaze Finch MD.  Electronically signed: Evelio Sevilla 01-30-25 @ 06:37    I, Blaze Finch MD,  personally performed the services described in this documentation. All medical record entries made by the scribe were at my direction and in my presence. I have reviewed the chart and agree that the record reflects my personal performance and is accurate and complete  Electronically signed: Blaze Finch MD.

## 2025-01-30 NOTE — PROGRESS NOTE ADULT - SUBJECTIVE AND OBJECTIVE BOX
MR#[id number]  PATIENT NAME:RAAD CANO    DATE OF SERVICE: 25 @ 06:02  Patient was seen and examined by Solo Quick MD on    25 @ 06:02 .  Interim events noted.Consultant notes ,Labs,Telemetry reviewed by me       HOSPITAL COURSE: HPI:  54M with PMH of HTN, HLD, ESRD on HD MWF via LUE AVF, ascites (requiring repeat paracenteses q4-6wks), NICM with moderate LV dysfunction w/ EF 35-40%() and CAD s/p atherectomy + SALLIE to D1(2024) presents to [facility] transferred from [facility]. Patient initially presented to [facility] ED with shortness of breath. Of note he was recently admitted from - for acute cholecystitis s/p cholecystectomy. In [facility] ED, pt was hypoxic to 86% on RA, trop 1.7K, EKG no new changes, CXR fluid overload. Admitted for AHRF 2/2 fluid overload. Pt received HD on , ,  and . DAPT was resumed, TTE showed improvement in LVEF to 40-45%. Stress test was abnormal with 1mm inferior STD, reversible ischemia in the inferior wall and fixed defects in the lateral, anterior and apical walls with post stress EF of 24%. Pt was then transferred to [facility] for cardiac cath which showed pLAD 70% ISR, mLCx 70%, D1 severe dz. Patient now transferred to [facility] CTS Dr. Rivers for CABG eval. Patient denies any current SOB or chest pain on admission. Otherwise denies headaches, abdominal pain, urinary or bowel changes, fevers, chills, N/V/D, sick contacts.  (24 Dec 2024 05:07)      INTERIM EVENTS:Patient seen at bedside ,interim events noted.   Cardiac cath  pLAD 70% ISR, mLCx 70%, D1 severe dz.   -POD#1-C3L free LIMA-LAD; SVG-Diag; SVG-leftPL Awake OOB extubated Sinus rhythm on Dobutamine gtt  - Was intubated last night for airway protection s/p bronchoscopy This morning had IABP inserted  remains sedated intubated Sinus Rhythm  - s/p IABP insertion, sepsis/septic shock due to GNR/ECOLI -Paracentesis for suspected bacterial peritonitis-no growth  -Awake on nocturnal vent support-Sinus rhythm-had been seen by Surgery for sacral wound  -Awake on Vent at nights noted bradycardic put back on  seen by EP  -Awake c/o buttock pain remains on Nocturnal Vent on  and Theophyline     PMH -reviewed admission note, no change since admission    MEDICATIONS  (STANDING):  acetylcysteine 10%  Inhalation 4 milliLiter(s) Inhalation every 6 hours  acyclovir IVPB 430 milliGRAM(s) IV Intermittent <User Schedule>  albuterol    0.083% 2.5 milliGRAM(s) Nebulizer every 6 hours  ascorbic acid 500 milliGRAM(s) Oral daily  aspirin  chewable 81 milliGRAM(s) Oral daily  atorvastatin 80 milliGRAM(s) Oral at bedtime  chlorhexidine 0.12% Liquid 15 milliLiter(s) Oral Mucosa every 12 hours  chlorhexidine 2% Cloths 1 Application(s) Topical daily  chlorhexidine 4% Liquid 1 Application(s) Topical <User Schedule>  dextrose 50% Injectable 50 milliLiter(s) IV Push every 15 minutes  DOBUTamine Infusion 2 MICROgram(s)/kG/Min (5.11 mL/Hr) IV Continuous <Continuous>  epoetin ignacia (EPOGEN) Injectable 09224 Unit(s) IV Push <User Schedule>  gabapentin 100 milliGRAM(s) Oral every 12 hours  heparin   Injectable 5000 Unit(s) SubCutaneous every 8 hours  insulin lispro (ADMELOG) corrective regimen sliding scale   SubCutaneous every 6 hours  lidocaine   4% Patch 1 Patch Transdermal daily  linezolid  IVPB      linezolid  IVPB 600 milliGRAM(s) IV Intermittent every 12 hours  melatonin 5 milliGRAM(s) Oral at bedtime  meropenem  IVPB 500 milliGRAM(s) IV Intermittent every 24 hours  methocarbamol 500 milliGRAM(s) Oral every 8 hours  mirtazapine 7.5 milliGRAM(s) Oral daily  Nephro-elicia 1 Tablet(s) Oral daily  pantoprazole  Injectable 40 milliGRAM(s) IV Push daily  polyethylene glycol 3350 17 Gram(s) Oral two times a day  sodium chloride 0.65% Nasal 1 Spray(s) Both Nostrils every 12 hours  sodium chloride 0.9%. 1000 milliLiter(s) (10 mL/Hr) IV Continuous <Continuous>  theophylline ER Capsule 100 milliGRAM(s) Oral <User Schedule>  traZODone 50 milliGRAM(s) Oral at bedtime    MEDICATIONS  (PRN):  acetaminophen     Tablet .. 650 milliGRAM(s) Oral every 6 hours PRN Mild Pain (1 - 3)  lidocaine/prilocaine Cream 1 Application(s) Topical daily PRN pre-HD  oxyCODONE    IR 5 milliGRAM(s) Oral every 6 hours PRN Moderate Pain (4 - 6)  oxyCODONE    IR 10 milliGRAM(s) Oral every 6 hours PRN Severe Pain (7 - 10)  sodium chloride 0.9% lock flush 10 milliLiter(s) IV Push every 1 hour PRN Pre/post blood products, medications, blood draw, and to maintain line patency            REVIEW OF SYSTEMS:  Constitutional: [ ] fever, [ ]weight loss,  [x ]fatigue [ ]weight gain  Eyes: [ ] visual changes  Respiratory: [ ]shortness of breath;  [ ] cough, [ ]wheezing, [ ]chills, [ ]hemoptysis  Cardiovascular: [ ] chest pain, [ ]palpitations, [ ]dizziness,  [ ]leg swelling[ ]orthopnea[ ]PND  Gastrointestinal: [ ] abdominal pain, [ ]nausea, [ ]vomiting,  [ ]diarrhea [ ]Constipation [ ]Melena  Genitourinary: [ ] dysuria, [ ] hematuria [ ]Vigil  Neurologic: [ ] headaches [ ] tremors[ ]weakness [ ]Paralysis Right[ ] Left[ ]  Skin: [ ] itching, [ ]burning, [ ] rashes  Endocrine: [ ] heat or cold intolerance  Musculoskeletal: [ ] joint pain or swelling; [x ] muscle, back, or extremity pain  Psychiatric: [ ] depression, [ ]anxiety, [ ]mood swings, or [ ]difficulty sleeping  Hematologic: [ ] easy bruising, [ ] bleeding gums    [ ] All remaining systems negative except as per above.   [ ]Unable to obtain.  [x] No change in ROS since admission      Vital Signs Last 24 Hrs  T(C): 36.7 (2025 00:00), Max: 37 (2025 18:30)  T(F): 98 (2025 00:00), Max: 98.6 (2025 18:30)  HR: 86 (2025 05:31) (59 - 94)  BP: 130/60 (2025 05:00) (126/66 - 200/90)  BP(mean): 86 (2025 05:00) (86 - 129)  RR: 16 (2025 05:00) (10 - 29)  SpO2: 99% (2025 05:31) (92% - 100%)    Parameters below as of 2025 05:31  Patient On (Oxygen Delivery Method): [facility]      I&O's Summary    2025 07:01  -  2025 07:00  --------------------------------------------------------  IN: 2265.1 mL / OUT: 0 mL / NET: 2265.1 mL    2025 07:01  -  2025 06:02  --------------------------------------------------------  IN: 3632.3 mL / OUT: 1800 mL / NET: 1832.3 mL        PHYSICAL EXAM:  General: No acute distress BMI-24  HEENT: EOMI, PERRL  Neck: Supple, [ ] JVD  Lungs: Equal air entry bilaterally; [ ] rales [ ] wheezing [ ] rhonchi  Heart: Regular rate and rhythm; [x ] murmur   2/6 [ x] systolic [ ] diastolic [ ] radiation[ ] rubs [ ]  gallops  Abdomen: Nontender, bowel sounds present  Extremities: No clubbing, cyanosis, [ ] edema [ ]Pulses  equal and intact  Nervous system:  Alert & Oriented X3, no focal deficits  Psychiatric: Normal affect  Skin: No rashes or lesions    LABS:      132[L]  |  95[L]  |  22  ----------------------------<  84  4.3   |  25  |  3.99[H]    Ca    8.4      2025 00:24  Phos  3.4       Mg     2.2         TPro  6.2  /  Alb  2.7[L]  /  TBili  0.5  /  DBili  x   /  AST  21  /  ALT  14  /  AlkPhos  129[H]      Creatinine Trend: 3.99<--, 5.51<--, 4.57<--, 6.22<--, 5.22<--, 4.32<--                        9.2    8.96  )-----------( 315      ( 2025 00:24 )             29.8        Xray Chest 1 View- PORTABLE-Routine (Xray Chest 1 View- PORTABLE-Routine in AM.) (25 @ 06:17) >  COMPARISON: Chest x-ray 2020.    FINDINGS:    2025, 6:18 PM  Tracheostomy. Enteric tube overlies the stomach. Median sternotomy.  Small left pleural effusion with bilateral perihilar and interstitial  opacities appear similar. Bibasilar atelectasis. No focal consolidation  or pneumothorax.  The heart size cannot be accurately assessed on this projection.  No acute osseous abnormalities.    2025, 5:44 AM  Small left pleural effusion, slightly decreased.    IMPRESSION:  Mild pulmonary edema appears similar.  Support devices as described above.    12 Lead ECG (25 @ 22:10) >  SINUS BRADYCARDIA  LEFT AXIS DEVIATION  INCOMPLETE RIGHT BUNDLE BRANCH BLOCK  ANTEROLATERAL INFARCT (CITED ON OR BEFORE 2025) , AGE UNDETERMINED  POSSIBLE INFERIOR INFARCT , AGE UNDETERMINED  ABNORMAL ECG     TTE W or WO Ultrasound Enhancing Agent (25 @ 12:42) >  CONCLUSIONS:      1. This was a limited study to assess left ventricular systolic function.   2. Left ventricular systolic function is normal with an ejection fraction of 60 % by Michaels's method of disks.   3. Moderately enlarged right ventricular cavity size and mild to moderately reduced right ventricular systolic function.   4. Moderate pericardial effusion noted adjacent to the right atrium.   5. There is no definitive evidence of RA diastolic collapse in the available views. The pericardium proximal to the RV free wall was not well visualized. Due to limited views (as above, and no subcostal views), the study is indeterminate for the presence of echocardiographic signs of tamponade.   6. Left atrium is severely dilated.   7. Compared to the transthoracic echocardiogram performed on 2025, this was also a limited study. The area proximal to the right atrial free wall is better visualized on the present study and the echofree space most consistent with a pericardial effusion is more evident. There is again visual evidence of right ventricular dysfunction.

## 2025-01-30 NOTE — PROGRESS NOTE ADULT - ASSESSMENT
54M with PMH of HTN, HLD, ESRD on HD MWF via LUE AVF, ascites (requiring repeat paracenteses q4-6wks), NICM with moderate LV dysfunction w/ EF 35-40%(2023) and CAD s/p atherectomy + SALLIE to D1(4/11/2024) presents to [facility] transferred from [facility] for CABG eval  Nephro on [facility] for HD    ESRD on HD   Regular schedule MWF   Access LUE AVF  [facility]  Nephrologist Dr. Bailey  S/p HD on 01/29 HD tomm  Keep MAP>65  Renal Diet  Consent in physical chart    Hyper/Hypokalemia  Monitor K, will change dialysate fluid as needed    HTN  currently on pressure support  monitor bp     CKD MBD  Can send a PTH  Ca and Phos daily    Anemia  CRISTIANE with HD  Transfuse if hgb <7    CAD with multivessel disease  s/p CABG 12/30  CTICU following    Hypoxic Resp failure requiring Trach  Per surgery  S/p bronchoscopy, transplant pulm following

## 2025-01-30 NOTE — PROGRESS NOTE ADULT - SUBJECTIVE AND OBJECTIVE BOX
[facility]-- WOUND TEAM -- FOLLOW UP NOTE  --------------------------------------------------------------------------------      24 hour events/subjective:  Afebrile  Last temp 36.3 C  Tolerating po w/o n/v  Patient is mobile, stands for long periods at a time with out assistance  Has writing tablet  Trache  Chart reviewed  Hx of   54M with PMH of HTN, HLD, ESRD on HD MWF via LUE AVF, ascites (requiring repeat paracenteses q4-6wks), NICM with moderate LV dysfunction w/ EF 35-40%(2023) and CAD s/p atherectomy + SALLIE to D1(4/11/2024) presents to [facility] transferred from [facility]. Patient initially presented to [facility] ED with shortness of breath. Of note he was recently admitted from 09/27-12/02 for acute cholecystitis s/p cholecystectomy. In [facility] ED, pt was hypoxic to 86% on RA, trop 1.7K, EKG no new changes, CXR fluid overload. Admitted for AHRF 2/2 fluid overload. Pt received HD on 12/18, 12/19, 12/20 and 12/22. DAPT was resumed, TTE showed improvement in LVEF to 40-45%. Stress test was abnormal with 1mm inferior STD, reversible ischemia in the inferior wall and fixed defects in the lateral, anterior and apical walls with post stress EF of 24%. Pt was then transferred to [facility] for cardiac cath which showed pLAD 70% ISR, mLCx 70%, D1 severe dz. Patient now transferred to [facility] CTS Dr. Rivers for CABG eval.  All questions answered to the expressed satisfaction of patient   dc planning ongoing          Diet:  Diet, NPO with Tube Feed:   Tube Feeding Modality: Nasogastric  Vital 1.5 Jeremias (VITAL1.5RTH)  Total Volume for 24 Hours (mL): 1560  Continuous  Starting Tube Feed Rate mL per Hour: 40  Until Goal Tube Feed Rate (mL per Hour): 65  Tube Feed Duration (in Hours): 24  Tube Feed Start Time: 15:28  No Carb Prosource (1pkg = 15gms Protein)     Qty per Day:  1  Banatrol TF     Qty per Day:  2 (01-29-25 @ 16:23)      ROS: General/ Skin/ Msk/ Neuro/ GI see HPI  all other systems negative      ALLERGIES & MEDICATIONS  --------------------------------------------------------------------------------  Allergies    No Known Allergies    Intolerances          STANDING INPATIENT MEDICATIONS    acetylcysteine 10%  Inhalation 4 milliLiter(s) Inhalation every 6 hours  acyclovir IVPB 430 milliGRAM(s) IV Intermittent <User Schedule>  albuterol    0.083% 2.5 milliGRAM(s) Nebulizer every 6 hours  ascorbic acid 500 milliGRAM(s) Oral daily  aspirin  chewable 81 milliGRAM(s) Oral daily  atorvastatin 80 milliGRAM(s) Oral at bedtime  chlorhexidine 0.12% Liquid 15 milliLiter(s) Oral Mucosa every 12 hours  chlorhexidine 2% Cloths 1 Application(s) Topical daily  chlorhexidine 4% Liquid 1 Application(s) Topical <User Schedule>  dextrose 50% Injectable 50 milliLiter(s) IV Push every 15 minutes  DOBUTamine Infusion 2 MICROgram(s)/kG/Min IV Continuous <Continuous>  epoetin ignacia (EPOGEN) Injectable 97629 Unit(s) IV Push <User Schedule>  gabapentin 100 milliGRAM(s) Oral every 12 hours  heparin   Injectable 5000 Unit(s) SubCutaneous every 8 hours  insulin lispro (ADMELOG) corrective regimen sliding scale   SubCutaneous every 6 hours  lidocaine   4% Patch 1 Patch Transdermal daily  linezolid  IVPB      linezolid  IVPB 600 milliGRAM(s) IV Intermittent every 12 hours  melatonin 5 milliGRAM(s) Oral at bedtime  meropenem  IVPB 500 milliGRAM(s) IV Intermittent every 24 hours  methocarbamol 500 milliGRAM(s) Oral every 8 hours  mirtazapine 7.5 milliGRAM(s) Oral daily  Nephro-elicia 1 Tablet(s) Oral daily  pantoprazole  Injectable 40 milliGRAM(s) IV Push daily  polyethylene glycol 3350 17 Gram(s) Oral two times a day  sodium chloride 0.65% Nasal 1 Spray(s) Both Nostrils every 12 hours  sodium chloride 0.9%. 1000 milliLiter(s) IV Continuous <Continuous>  theophylline ER Capsule 100 milliGRAM(s) Oral <User Schedule>  traZODone 50 milliGRAM(s) Oral at bedtime      PRN INPATIENT MEDICATION  acetaminophen     Tablet .. 650 milliGRAM(s) Oral every 6 hours PRN  lidocaine/prilocaine Cream 1 Application(s) Topical daily PRN  oxyCODONE    IR 5 milliGRAM(s) Oral every 6 hours PRN  oxyCODONE    IR 10 milliGRAM(s) Oral every 6 hours PRN  sodium chloride 0.9% lock flush 10 milliLiter(s) IV Push every 1 hour PRN        VITALS/PHYSICAL EXAM  --------------------------------------------------------------------------------  T(C): 36.3 (01-30-25 @ 12:00), Max: 37.2 (01-30-25 @ 08:00)  HR: 64 (01-30-25 @ 12:00) (64 - 94)  BP: 150/81 (01-30-25 @ 12:00) (126/66 - 200/90)  RR: 23 (01-30-25 @ 12:00) (10 - 33)  SpO2: 100% (01-30-25 @ 12:00) (92% - 100%)  Wt(kg): --        01-29-25 @ 07:01  -  01-30-25 @ 07:00  --------------------------------------------------------  IN: 3632.3 mL / OUT: 1800 mL / NET: 1832.3 mL    01-30-25 @ 07:01  -  01-30-25 @ 13:08  --------------------------------------------------------  IN: 125.5 mL / OUT: 0 mL / NET: 125.5 mL      NAD/ Alert/ standing at table/ thin  WD/ WN/Progressa bed  HEENT:  sclera clear, mucosa moist, throat clear, trachea midline, neck supple  Respiratory: nonlabored w/ equal chest rise  Gastrointestinal: soft NT/ND, (+)NGT  : Deferred   Neurology: verbal, can follow commands  Psych: calm, appropiate  Musculoskeletal:  no deformities/ contractures  Vascular: BLE equally warm, no cyanosis, clubbing, edema nor acute ischemia  Skin:  moist w/ good turgor   sacral region, BL buttocks erosion of tissue with central area dark eschar      mostly stable hard adherent eschar        6.0 cm x 3.0 cm x 0.5 cm. periwound intact, without erythema        scant serosanguinous drainage, there is no increase in warmth         no induration, fluctuance, nor crepitus            LABS/ CULTURES/ RADIOLOGY:                     9.2    8.96  >-----------<  315      [01-30-25 @ 00:24]              29.8     132  |  95  |  22  ----------------------------<  84      [01-30-25 @ 00:24]  4.3   |  25  |  3.99        Ca     8.4     [01-30-25 @ 00:24]      Mg     2.2     [01-30-25 @ 00:24]      Phos  3.4     [01-30-25 @ 00:24]    TPro  6.2  /  Alb  2.7  /  TBili  0.5  /  DBili  x   /  AST  21  /  ALT  14  /  AlkPhos  129  [01-30-25 @ 00:24]              CAPILLARY BLOOD GLUCOSE      POCT Blood Glucose.: 116 mg/dL (30 Jan 2025 11:41)  POCT Blood Glucose.: 134 mg/dL (30 Jan 2025 05:16)  POCT Blood Glucose.: 134 mg/dL (30 Jan 2025 00:57)  POCT Blood Glucose.: 131 mg/dL (29 Jan 2025 17:40)                  Culture - Blood (collected 01-27-25 @ 16:58)  Source: .Blood BLOOD  Preliminary Report (01-29-25 @ 20:01):    No growth at 48 Hours    Culture - Blood (collected 01-27-25 @ 16:58)  Source: .Blood BLOOD  Preliminary Report (01-29-25 @ 20:01):    No growth at 48 Hours          A1C with Estimated Average Glucose Result: 5.6 % (12-24-24 @ 06:50)

## 2025-01-30 NOTE — PROGRESS NOTE ADULT - ASSESSMENT
54M with HTN, HLD, ESRD on HD MWF via LUE AVF, ascites (requiring repeat paracenteses q4-6wks), NICM with moderate LV dysfunction w/ EF 35-40%() and CAD s/p atherectomy + SALLIE to D1 (2024) presents to [facility] 2024 as transfer from [facility] (via [facility]) where he presented  with SOB.   In [facility] ED, pt was hypoxic to 86% on RA, trop 1.7K, EKG no new changes, CXR fluid overload. Admitted for AHRF 2/2 fluid overload. Pt received HD on , ,  and . DAPT was resumed, TTE showed improvement in LVEF to 40-45%. Stress test was abnormal with 1mm inferior STD, reversible ischemia in the inferior wall and fixed defects in the lateral, anterior and apical walls with post stress EF of 24%. Pt was then transferred to [facility] for cardiac cath which showed pLAD 70% ISR, mLCx 70%, D1 severe dz. Patient now transferred to [facility] 2024 for CABG.       - underwent  C3L free LIMA-LAD; SVG-Diag; SVG-leftPL   - extubated   - hypotension and ECHO showing Systolic HF/depressed BIV Function, currently supported with /pressors.  Labs this evening showing transaminitis   - hypotensive, febrile and reintubated  1/3 - cultures (+) E coli +ESBL bacteremia   - underwent tracheostomy   - left lung collapse s/p bronchoscopy   - seen by plastic surgery / colorectal for sacral wound, no surgical intervention, no evidence of fistula, BC sent  - c/o right arm  pain, started on acyclovir for positive HSV PCR     doing a little better tissue culture with serratia; bronch also growing some yeast and VREC   back with nodule with some discharge    Recommendations  - continue linezolid  - repeat BC  so far negative  - can continue meropenem for now (tissue culture with serratia, taken from back)  - f/u surgery- to look at back and decubitus  - yeast in bronch, not clear if true infection, monitor off antifungal for now  - acyclovir, dosed for renal insufficiency  - await colorectal follow up for ? fluid collection / phlegmon    Marla Champion M.D. ,   please reach via teams   If no answer, or after 5PM/ weekends,  then please call  [phone]    Assessment and plan discussed with the primary team .

## 2025-01-30 NOTE — PROGRESS NOTE ADULT - ASSESSMENT
54 year old male with HTN, HLD, ESRD on HD, ascites, CHF, and CAD s/p PCI (4/2024) transferred to [facility] from [facility] for cardiac catheterization due to abnormal stress test. Cath showed multivessel disease now s/p CABG 12/30/24. Course c/b acute on chronic hypoxemic respiratory failure in setting of recurrent mucus plugging on left s/p multiple bronchoscopies.     Patient seen and examined at the bedside. Repeat bronchoscopy performed which showed persistent secretion burden on left side.  No evidence of dynamic airway collapse or TBM noted on exam with patient now awake and cooperative. Would continue to optimize with airway clearance ATC.  Cont antibiotics and follow up BAL results. Appreciate the consultation and will continue to follow.   54 year old male with HTN, HLD, ESRD on HD, ascites, CHF, and CAD s/p PCI (4/2024) transferred to [facility] from [facility] for cardiac catheterization due to abnormal stress test. Cath showed multivessel disease now s/p CABG 12/30/24. Course c/b acute on chronic hypoxemic respiratory failure in setting of recurrent mucus plugging on left s/p multiple bronchoscopies.     Repeat bronchoscopy performed which showed persistent secretion burden on left side.  No significant dynamic airway collapse noted on exam  Continue airway clearance ATC - nebulizers, 3% nebulized hypertonic saline, volera  Antibiotics for pneumonia 54 year old male with HTN, HLD, ESRD on HD, ascites, CHF, and CAD s/p PCI (4/2024) transferred to [facility] from [facility] for cardiac catheterization due to abnormal stress test. Cath showed multivessel disease now s/p CABG 12/30/24. Course c/b acute hypoxemic respiratory failure in setting of recurrent mucus plugging on left s/p multiple bronchoscopies.     Repeat bronchoscopy performed which showed persistent secretion burden on left side.  No significant dynamic airway collapse noted on exam  Continue airway clearance ATC - nebulizers, 3% nebulized hypertonic saline, volera  Antibiotics for pneumonia

## 2025-01-30 NOTE — PROCEDURE NOTE - NSBRONCHPROCDETAILS_GEN_A_CORE_FT
Bronchoscope inserted through tracheostomy to level of mea.  Evaluation for dynamic collapse of proximal airways.  Therapeutic aspiration of secretions from both bronchial trees.  BAL done of LLL with 50 cc of NS.  Bronchoscope removed from tracheostomy.  Bronchoscope inserted through tracheostomy to level of ema.  Evaluation for dynamic collapse of proximal airways.  Therapeutic aspiration of secretions from right and left bronchial tree  BAL done of LLL - approx 50 cc saline instilled  Bronchoscope removed from tracheostomy.

## 2025-01-30 NOTE — PROGRESS NOTE ADULT - SUBJECTIVE AND OBJECTIVE BOX
24H hour events: No acute overnight events. Manav to the 50s overnight. Patient complaining of pain to his buttocks where he appears to have a decubitus ulcer    MEDICATIONS:  aspirin  chewable 81 milliGRAM(s) Oral daily  DOBUTamine Infusion 2 MICROgram(s)/kG/Min IV Continuous <Continuous>  heparin   Injectable 5000 Unit(s) SubCutaneous every 8 hours  acyclovir IVPB 430 milliGRAM(s) IV Intermittent <User Schedule>  linezolid  IVPB      linezolid  IVPB 600 milliGRAM(s) IV Intermittent every 12 hours  meropenem  IVPB 500 milliGRAM(s) IV Intermittent every 24 hours  acetylcysteine 10%  Inhalation 4 milliLiter(s) Inhalation every 6 hours  albuterol    0.083% 2.5 milliGRAM(s) Nebulizer every 6 hours  theophylline ER Capsule 100 milliGRAM(s) Oral <User Schedule  acetaminophen     Tablet .. 650 milliGRAM(s) Oral every 6 hours PRN  gabapentin 100 milliGRAM(s) Oral every 12 hours  melatonin 5 milliGRAM(s) Oral at bedtime  methocarbamol 500 milliGRAM(s) Oral every 8 hours  mirtazapine 7.5 milliGRAM(s) Oral daily  oxyCODONE    IR 5 milliGRAM(s) Oral every 6 hours PRN  oxyCODONE    IR 10 milliGRAM(s) Oral every 6 hours PRN  traZODone 50 milliGRAM(s) Oral at bedtime  pantoprazole  Injectable 40 milliGRAM(s) IV Push daily  polyethylene glycol 3350 17 Gram(s) Oral two times a day  atorvastatin 80 milliGRAM(s) Oral at bedtime  dextrose 50% Injectable 50 milliLiter(s) IV Push every 15 minutes  insulin lispro (ADMELOG) corrective regimen sliding scale   SubCutaneous every 6 hours  ascorbic acid 500 milliGRAM(s) Oral daily  chlorhexidine 0.12% Liquid 15 milliLiter(s) Oral Mucosa every 12 hours  chlorhexidine 2% Cloths 1 Application(s) Topical daily  chlorhexidine 4% Liquid 1 Application(s) Topical <User Schedule>  epoetin ignacia (EPOGEN) Injectable 19111 Unit(s) IV Push <User Schedule>  lidocaine   4% Patch 1 Patch Transdermal daily  lidocaine/prilocaine Cream 1 Application(s) Topical daily PRN  Nephro-elicia 1 Tablet(s) Oral daily  sodium chloride 0.65% Nasal 1 Spray(s) Both Nostrils every 12 hours  sodium chloride 0.9% lock flush 10 milliLiter(s) IV Push every 1 hour PRN  sodium chloride 0.9%. 1000 milliLiter(s) IV Continuous <Continuous>      REVIEW OF SYSTEMS:  See HPI, otherwise ROS negative.    PHYSICAL EXAM:  T(C): 37.2 (01-30-25 @ 08:00), Max: 37.2 (01-30-25 @ 08:00)  HR: 69 (01-30-25 @ 09:24) (64 - 94)  BP: 136/71 (01-30-25 @ 09:00) (126/66 - 200/90)  RR: 10 (01-30-25 @ 09:00) (10 - 29)  SpO2: 100% (01-30-25 @ 09:24) (92% - 100%)  Wt(kg): --  I&O's Summary    29 Jan 2025 07:01  -  30 Jan 2025 07:00  --------------------------------------------------------  IN: 3632.3 mL / OUT: 1800 mL / NET: 1832.3 mL    30 Jan 2025 07:01  -  30 Jan 2025 10:21  --------------------------------------------------------  IN: 10.2 mL / OUT: 0 mL / NET: 10.2 mL      Appearance: Alert. NAD	  HEENT:   NC/AT	  Cardiovascular: +S1S2 RRR no m/g/r  Respiratory: CTA B/L	  Psychiatry: A & O x 3, Mood & affect appropriate  Neurologic: Non-focal  Extremities: No edema       LABS:	 	    CBC Full  -  ( 30 Jan 2025 00:24 )  WBC Count : 8.96 K/uL  Hemoglobin : 9.2 g/dL  Hematocrit : 29.8 %  Platelet Count - Automated : 315 K/uL  Mean Cell Volume : 92.5 fl  Mean Cell Hemoglobin : 28.6 pg  Mean Cell Hemoglobin Concentration : 30.9 g/dL  Auto Neutrophil # : 6.82 K/uL  Auto Lymphocyte # : 0.52 K/uL  Auto Monocyte # : 1.01 K/uL  Auto Eosinophil # : 0.52 K/uL  Auto Basophil # : 0.05 K/uL  Auto Neutrophil % : 76.1 %  Auto Lymphocyte % : 5.8 %  Auto Monocyte % : 11.3 %  Auto Eosinophil % : 5.8 %  Auto Basophil % : 0.6 %    01-30    132[L]  |  95[L]  |  22  ----------------------------<  84  4.3   |  25  |  3.99[H]  01-29    132[L]  |  92[L]  |  32[H]  ----------------------------<  101[H]  4.2   |  26  |  5.51[H]    Ca    8.4      30 Jan 2025 00:24  Ca    8.5      29 Jan 2025 00:58  Phos  3.4     01-30  Phos  4.4     01-29  Mg     2.2     01-30  Mg     2.4     01-29    TPro  6.2  /  Alb  2.7[L]  /  TBili  0.5  /  DBili  x   /  AST  21  /  ALT  14  /  AlkPhos  129[H]  01-30  TPro  5.9[L]  /  Alb  2.6[L]  /  TBili  0.4  /  DBili  x   /  AST  20  /  ALT  11  /  AlkPhos  118  01-29    TELEMETRY: SR 60s  	    ECHO:    TRANSTHORACIC ECHOCARDIOGRAM REPORT  ________________________________________________________________________________                                      _______       Pt. Name:       ALAINA CANO Study Date:    1/27/2025  MRN:            [id number]       YOB: 1970  Accession #:    [id number]        Age:           54 years  Account#:       [id number]     Gender:        M  Heart Rate:     112 bpm          Height:        70.00 in (177.80 cm)  Rhythm:Weight:        188.00 lb (85.28 kg)  Blood Pressure: 146/67 mmHg      BSA/BMI:       2.03 m² / 26.98 kg/m²  ________________________________________________________________________________________  Referring Physician:    [id number] Karan Rivers  Interpreting Physician: Efrain Gabriel  Primary Sonographer:    Yaneli Al RDCS    CPT:               ECHO TTE W/O CON F/U LTD - 75552.m  Indication(s):     Abnormal electrocardiogram ECG EKG - R94.31  Procedure:         Limited transthoracic echocardiogram.  Ordering Location: [facility]  Admission Status:  Inpatient  Study Information: Image quality for this study is adequate.    _______________________________________________________________________________________     CONCLUSIONS:      1. This was a limited study to assess left ventricular systolic function.   2. Left ventricular systolic function is normal with an ejection fraction of 60 % by Michaels's method of disks.   3. Moderately enlarged right ventricular cavity size and mild to moderately reduced right ventricular systolic function.   4. Moderate pericardial effusion noted adjacent to the right atrium.   5. There is no definitive evidence of RA diastolic collapse in the available views. The pericardium proximal to the RV free wall was not well visualized. Due to limited views (as above, and no subcostal views), the study is indeterminate for the presence of echocardiographic signs of tamponade.   6. Left atrium is severely dilated.   7. Compared to the transthoracic echocardiogram performed on 1/19/2025, this was also a limited study. The area proximal to the right atrial free wall is better visualized on the present study and the echofree space most consistent with a pericardial effusion is more evident. There is again visual evidence of right ventricular dysfunction.    ________________________________________________________________________________________  FINDINGS:     Left Ventricle:  The left ventricular cavity is normal in size. Left ventricular wall thickness is mildly increased. Left ventricular systolic function is normal with a calculated ejection fraction of 60 % by the Michaels's biplane method of disks. There are no regional wall motion abnormalities seen.     Right Ventricle:  The right ventricleis not well visualized. The right ventricular cavity is moderately enlarged in size and right ventricular systolic function is mild to moderately reduced.     Left Atrium:  The left atrium is severely dilated with an indexed volume of 52.63 ml/m².     Right Atrium:  The right atrium is mildly dilated with an indexed volume of 37.38 ml/m².     Pericardium:  There is a moderate pericardial effusion noted adjacent to the right atrium. The pericardial effusion measures up to 1.2 cm in the greatest visualized dimension. There is no definitive evidence of RA diastolic collapse in the available views. The pericardium proximal to the RV free wall was not well visualized. Due to limited views (as above, and no subcostal views), the study is indeterminate for the presence of echocardiographic signs of tamponade.  ____________________________________________________________________  QUANTITATIVE DATA:  Left Ventricle Measurements: (Indexed to BSA)     IVSd (2D):   0.9 cm  LVPWd (2D):  1.1 cm  LVIDd (2D):  5.3 cm  LVIDs (2D):  3.2 cm  LV Mass:     191 g  94.2 g/m²  LV Vol d, MOD A2C: 119.0 ml 58.53 ml/m²  LV Vol d, MOD A4C: 108.5 ml 53.37 ml/m²  LV Vol d, MOD BP:  113.5 ml 55.82 ml/m²  LV Vol s, MOD A2C: 42.2 ml  20.76 ml/m²  LV Vol s, MOD A4C: 49.4 ml  24.30 ml/m²  LV Vol s, MOD BP:  45.9 ml  22.59 ml/m²  LVOT SV MOD BP:    67.5 ml  LV EF% MOD BP:     60 %    Aorta Measurements: (Normal range) (Indexed to BSA)     Ao Root d 3.55 cm (3.1 - 3.7 cm) 1.75 cm/m²             Right Ventricle Measurements: Right Atrial Measurements:     RV Base (RVID1): 5.3 cm       RA Vol:       76.00 ml                                RA Vol Index: 37.38 ml/m²    ________________________________________________________________________________________  Electronically signed on 1/27/2025 at 2:41:01 PM by Efrain Gabriel

## 2025-01-30 NOTE — PROGRESS NOTE ADULT - SUBJECTIVE AND OBJECTIVE BOX
[facility]    Dr. Nica Styles     Office [phone] (9 am to 5 pm)  Service: [phone] (5pm to 9am)  Also Available on TEAMS      RENAL PROGRESS NOTE: DATE OF SERVICE 01-30-25 @ 13:08    Patient is a 54y old  Male who presents with a chief complaint of CAD s/p CABG (30 Jan 2025 06:01)      Patient seen and examined at bedside. No chest pain/sob    VITALS:  T(F): 97.3 (01-30-25 @ 12:00), Max: 98.9 (01-30-25 @ 08:00)  HR: 64 (01-30-25 @ 12:00)  BP: 150/81 (01-30-25 @ 12:00)  RR: 23 (01-30-25 @ 12:00)  SpO2: 100% (01-30-25 @ 12:00)  Wt(kg): --    01-29 @ 07:01 - 01-30 @ 07:00  --------------------------------------------------------  IN: 3632.3 mL / OUT: 1800 mL / NET: 1832.3 mL    01-30 @ 07:01 - 01-30 @ 13:08  --------------------------------------------------------  IN: 125.5 mL / OUT: 0 mL / NET: 125.5 mL          PHYSICAL EXAM:  Constitutional: NAD  Neck: No JVD  Respiratory: CTAB, no wheezes, rales or rhonchi  Cardiovascular: S1, S2, RRR  Gastrointestinal: BS+, soft, NT/ND  Extremities: No peripheral edema    Hospital Medications:   MEDICATIONS  (STANDING):  acetylcysteine 10%  Inhalation 4 milliLiter(s) Inhalation every 6 hours  acyclovir IVPB 430 milliGRAM(s) IV Intermittent <User Schedule>  albuterol    0.083% 2.5 milliGRAM(s) Nebulizer every 6 hours  ascorbic acid 500 milliGRAM(s) Oral daily  aspirin  chewable 81 milliGRAM(s) Oral daily  atorvastatin 80 milliGRAM(s) Oral at bedtime  chlorhexidine 0.12% Liquid 15 milliLiter(s) Oral Mucosa every 12 hours  chlorhexidine 2% Cloths 1 Application(s) Topical daily  chlorhexidine 4% Liquid 1 Application(s) Topical <User Schedule>  dextrose 50% Injectable 50 milliLiter(s) IV Push every 15 minutes  DOBUTamine Infusion 2 MICROgram(s)/kG/Min (5.11 mL/Hr) IV Continuous <Continuous>  epoetin ignacia (EPOGEN) Injectable 02741 Unit(s) IV Push <User Schedule>  gabapentin 100 milliGRAM(s) Oral every 12 hours  heparin   Injectable 5000 Unit(s) SubCutaneous every 8 hours  insulin lispro (ADMELOG) corrective regimen sliding scale   SubCutaneous every 6 hours  lidocaine   4% Patch 1 Patch Transdermal daily  linezolid  IVPB      linezolid  IVPB 600 milliGRAM(s) IV Intermittent every 12 hours  melatonin 5 milliGRAM(s) Oral at bedtime  meropenem  IVPB 500 milliGRAM(s) IV Intermittent every 24 hours  methocarbamol 500 milliGRAM(s) Oral every 8 hours  mirtazapine 7.5 milliGRAM(s) Oral daily  Nephro-elicia 1 Tablet(s) Oral daily  pantoprazole  Injectable 40 milliGRAM(s) IV Push daily  polyethylene glycol 3350 17 Gram(s) Oral two times a day  sodium chloride 0.65% Nasal 1 Spray(s) Both Nostrils every 12 hours  sodium chloride 0.9%. 1000 milliLiter(s) (10 mL/Hr) IV Continuous <Continuous>  theophylline ER Capsule 100 milliGRAM(s) Oral <User Schedule>  traZODone 50 milliGRAM(s) Oral at bedtime      LABS:  01-30    132[L]  |  95[L]  |  22  ----------------------------<  84  4.3   |  25  |  3.99[H]    Ca    8.4      30 Jan 2025 00:24  Phos  3.4     01-30  Mg     2.2     01-30    TPro  6.2  /  Alb  2.7[L]  /  TBili  0.5  /  DBili      /  AST  21  /  ALT  14  /  AlkPhos  129[H]  01-30    Creatinine Trend: 3.99 <--, 5.51 <--, 4.57 <--, 6.22 <--, 5.22 <--, 4.32 <--, 5.64 <--    Albumin: 2.7 g/dL (01-30 @ 00:24)  Phosphorus: 3.4 mg/dL (01-30 @ 00:24)                              9.2    8.96  )-----------( 315      ( 30 Jan 2025 00:24 )             29.8     Urine Studies:  Urinalysis - [01-30-25 @ 00:24]      Color  / Appearance  / SG  / pH       Gluc 84 / Ketone   / Bili  / Urobili        Blood  / Protein  / Leuk Est  / Nitrite       RBC  / WBC  / Hyaline  / Gran  / Sq Epi  / Non Sq Epi  / Bacteria       Iron 29, TIBC 143, %sat 20      [12-19-24 @ 05:00]  Ferritin 904      [12-19-24 @ 05:00]  TSH 4.75      [12-24-24 @ 06:50]        RADIOLOGY & ADDITIONAL STUDIES:

## 2025-01-30 NOTE — PROCEDURE NOTE - NSBRONCHCOMMENTS_GEN_A_CORE_FT
No evidence of tracheobronchomalacia  Thick secretions (L >> R) - suctioned. LLL BAL. No evidence of tracheobronchomalacia  Copious secretion on left

## 2025-01-30 NOTE — PROGRESS NOTE ADULT - SUBJECTIVE AND OBJECTIVE BOX
Patient is a 54y old  Male who presents with a chief complaint of CAD s/p CABG (30 Jan 2025 06:01)    Being followed by ID for        Interval history:  No other acute events      ROS:  No cough,SOB,CP  No N/V/D  No abd pain  No urinary complaints  No HA  No joint or limb pain  No other complaints    PAST MEDICAL & SURGICAL HISTORY:  Type 2 diabetes mellitus      Hypertension      End stage renal disease  started HD 2/2019 T, Th, Sat via right chest permacath      Bone spur  right shoulder- hx of - sx done      Anemia      Injury of right wrist  hx of at age 14      History of hyperkalemia  before HD- K-6.2 - to repeat in am of sx, pt had HD today      S/P arthroscopy of right shoulder  2010      History of vascular access device  right chest permacath - 2/2019      H/O right wrist surgery  tendons repair - at age 14      AV fistula      H/O ventral hernia repair      S/P cholecystectomy        Allergies    No Known Allergies    Intolerances      Antimicrobials:    acyclovir IVPB 430 milliGRAM(s) IV Intermittent <User Schedule>  linezolid  IVPB      linezolid  IVPB 600 milliGRAM(s) IV Intermittent every 12 hours  meropenem  IVPB 500 milliGRAM(s) IV Intermittent every 24 hours    MEDICATIONS  (STANDING):  acetylcysteine 10%  Inhalation 4 milliLiter(s) Inhalation every 6 hours  acyclovir IVPB 430 milliGRAM(s) IV Intermittent <User Schedule>  albuterol    0.083% 2.5 milliGRAM(s) Nebulizer every 6 hours  ascorbic acid 500 milliGRAM(s) Oral daily  aspirin  chewable 81 milliGRAM(s) Oral daily  atorvastatin 80 milliGRAM(s) Oral at bedtime  chlorhexidine 0.12% Liquid 15 milliLiter(s) Oral Mucosa every 12 hours  chlorhexidine 2% Cloths 1 Application(s) Topical daily  chlorhexidine 4% Liquid 1 Application(s) Topical <User Schedule>  dextrose 50% Injectable 50 milliLiter(s) IV Push every 15 minutes  DOBUTamine Infusion 2 MICROgram(s)/kG/Min (5.11 mL/Hr) IV Continuous <Continuous>  epoetin ignacia (EPOGEN) Injectable 36503 Unit(s) IV Push <User Schedule>  gabapentin 100 milliGRAM(s) Oral every 12 hours  heparin   Injectable 5000 Unit(s) SubCutaneous every 8 hours  insulin lispro (ADMELOG) corrective regimen sliding scale   SubCutaneous every 6 hours  lidocaine   4% Patch 1 Patch Transdermal daily  linezolid  IVPB      linezolid  IVPB 600 milliGRAM(s) IV Intermittent every 12 hours  melatonin 5 milliGRAM(s) Oral at bedtime  meropenem  IVPB 500 milliGRAM(s) IV Intermittent every 24 hours  methocarbamol 500 milliGRAM(s) Oral every 8 hours  mirtazapine 7.5 milliGRAM(s) Oral daily  Nephro-elicia 1 Tablet(s) Oral daily  pantoprazole  Injectable 40 milliGRAM(s) IV Push daily  polyethylene glycol 3350 17 Gram(s) Oral two times a day  sodium chloride 0.65% Nasal 1 Spray(s) Both Nostrils every 12 hours  sodium chloride 0.9%. 1000 milliLiter(s) (10 mL/Hr) IV Continuous <Continuous>  theophylline ER Capsule 100 milliGRAM(s) Oral <User Schedule>  traZODone 50 milliGRAM(s) Oral at bedtime      Vital Signs Last 24 Hrs  T(C): 36.3 (01-30-25 @ 12:00), Max: 37.2 (01-30-25 @ 08:00)  T(F): 97.3 (01-30-25 @ 12:00), Max: 98.9 (01-30-25 @ 08:00)  HR: 64 (01-30-25 @ 12:00) (64 - 94)  BP: 150/81 (01-30-25 @ 12:00) (126/66 - 200/90)  BP(mean): 110 (01-30-25 @ 12:00) (86 - 129)  RR: 23 (01-30-25 @ 12:00) (10 - 33)  SpO2: 100% (01-30-25 @ 12:00) (92% - 100%)    Physical Exam:    Constitutional well preserved,comfortable,pleasant    HEENT PERRLA EOMI,No pallor or icterus    No oral exudate or erythema    Neck supple no JVD or LN    Chest Good AE,CTA    CVS RRR S1 S2 WNl No murmur or rub or gallop    Abd soft BS normal No tenderness no masses    Ext No cyanosis clubbing or edema    IV site no erythema tenderness or discharge    Joints no swelling or LOM    CNS AAO X 3 no focal    Lab Data:                          9.2    8.96  )-----------( 315      ( 30 Jan 2025 00:24 )             29.8       01-30    132[L]  |  95[L]  |  22  ----------------------------<  84  4.3   |  25  |  3.99[H]    Ca    8.4      30 Jan 2025 00:24  Phos  3.4     01-30  Mg     2.2     01-30    TPro  6.2  /  Alb  2.7[L]  /  TBili  0.5  /  DBili  x   /  AST  21  /  ALT  14  /  AlkPhos  129[H]  01-30      Urinalysis Basic - ( 30 Jan 2025 00:24 )    Color: x / Appearance: x / SG: x / pH: x  Gluc: 84 mg/dL / Ketone: x  / Bili: x / Urobili: x   Blood: x / Protein: x / Nitrite: x   Leuk Esterase: x / RBC: x / WBC x   Sq Epi: x / Non Sq Epi: x / Bacteria: x        Bronchial  01-28-25   Commensal manjit consistent with body site  --    Moderate polymorphonuclear leukocytes per low power field  Numerous Squamous epithelial cells per low power field  Few Yeast like cells per oil power field      .Blood BLOOD  01-27-25   No growth at 48 Hours  --  --      Tissue  01-26-25   Rare Serratia marcescens  --  Serratia marcescens      Bronchial  01-24-25   Culture is being performed.  --  Enterococcus faecium (vancomycin resistant)                    WBC Count: 8.96 (01-30-25 @ 00:24)  WBC Count: 7.37 (01-29-25 @ 00:58)  WBC Count: 8.40 (01-28-25 @ 00:24)  WBC Count: 10.28 (01-27-25 @ 00:54)  WBC Count: 11.39 (01-26-25 @ 00:27)  WBC Count: 9.97 (01-25-25 @ 00:32)  WBC Count: 10.07 (01-24-25 @ 00:26)       Bilirubin Total: 0.5 mg/dL (01-30-25 @ 00:24)  Aspartate Aminotransferase (AST/SGOT): 21 U/L (01-30-25 @ 00:24)  Alanine Aminotransferase (ALT/SGPT): 14 U/L (01-30-25 @ 00:24)  Alkaline Phosphatase: 129 U/L (01-30-25 @ 00:24)  Bilirubin Total: 0.4 mg/dL (01-29-25 @ 00:58)  Aspartate Aminotransferase (AST/SGOT): 20 U/L (01-29-25 @ 00:58)  Alanine Aminotransferase (ALT/SGPT): 11 U/L (01-29-25 @ 00:58)  Alkaline Phosphatase: 118 U/L (01-29-25 @ 00:58)  Bilirubin Total: 0.4 mg/dL (01-28-25 @ 00:24)  Aspartate Aminotransferase (AST/SGOT): 19 U/L (01-28-25 @ 00:24)  Alanine Aminotransferase (ALT/SGPT): 12 U/L (01-28-25 @ 00:24)  Alkaline Phosphatase: 116 U/L (01-28-25 @ 00:24)  Bilirubin Total: 0.5 mg/dL (01-27-25 @ 00:54)  Aspartate Aminotransferase (AST/SGOT): 17 U/L (01-27-25 @ 00:54)  Alanine Aminotransferase (ALT/SGPT): 9 U/L (01-27-25 @ 00:54)  Alkaline Phosphatase: 125 U/L (01-27-25 @ 00:54)  Bilirubin Total: 0.4 mg/dL (01-26-25 @ 00:28)  Aspartate Aminotransferase (AST/SGOT): 21 U/L (01-26-25 @ 00:28)  Alanine Aminotransferase (ALT/SGPT): 13 U/L (01-26-25 @ 00:28)  Alkaline Phosphatase: 129 U/L (01-26-25 @ 00:28)         Patient is a 54y old  Male who presents with a chief complaint of CAD s/p CABG (30 Jan 2025 06:01)    Being followed by ID for        Interval history:  pt remains afebrile  right arm feels better  off loading back side   back lesion noted- old burn per patient  No other acute events          PAST MEDICAL & SURGICAL HISTORY:  Type 2 diabetes mellitus      Hypertension      End stage renal disease  started HD 2/2019 T, Th, Sat via right chest permacath      Bone spur  right shoulder- hx of - sx done      Anemia      Injury of right wrist  hx of at age 14      History of hyperkalemia  before HD- K-6.2 - to repeat in am of sx, pt had HD today      S/P arthroscopy of right shoulder  2010      History of vascular access device  right chest permacath - 2/2019      H/O right wrist surgery  tendons repair - at age 14      AV fistula      H/O ventral hernia repair      S/P cholecystectomy        Allergies    No Known Allergies    Intolerances      Antimicrobials:    acyclovir IVPB 430 milliGRAM(s) IV Intermittent <User Schedule>  linezolid  IVPB      linezolid  IVPB 600 milliGRAM(s) IV Intermittent every 12 hours  meropenem  IVPB 500 milliGRAM(s) IV Intermittent every 24 hours    MEDICATIONS  (STANDING):  acetylcysteine 10%  Inhalation 4 milliLiter(s) Inhalation every 6 hours  acyclovir IVPB 430 milliGRAM(s) IV Intermittent <User Schedule>  albuterol    0.083% 2.5 milliGRAM(s) Nebulizer every 6 hours  ascorbic acid 500 milliGRAM(s) Oral daily  aspirin  chewable 81 milliGRAM(s) Oral daily  atorvastatin 80 milliGRAM(s) Oral at bedtime  chlorhexidine 0.12% Liquid 15 milliLiter(s) Oral Mucosa every 12 hours  chlorhexidine 2% Cloths 1 Application(s) Topical daily  chlorhexidine 4% Liquid 1 Application(s) Topical <User Schedule>  dextrose 50% Injectable 50 milliLiter(s) IV Push every 15 minutes  DOBUTamine Infusion 2 MICROgram(s)/kG/Min (5.11 mL/Hr) IV Continuous <Continuous>  epoetin ignacia (EPOGEN) Injectable 82492 Unit(s) IV Push <User Schedule>  gabapentin 100 milliGRAM(s) Oral every 12 hours  heparin   Injectable 5000 Unit(s) SubCutaneous every 8 hours  insulin lispro (ADMELOG) corrective regimen sliding scale   SubCutaneous every 6 hours  lidocaine   4% Patch 1 Patch Transdermal daily  linezolid  IVPB      linezolid  IVPB 600 milliGRAM(s) IV Intermittent every 12 hours  melatonin 5 milliGRAM(s) Oral at bedtime  meropenem  IVPB 500 milliGRAM(s) IV Intermittent every 24 hours  methocarbamol 500 milliGRAM(s) Oral every 8 hours  mirtazapine 7.5 milliGRAM(s) Oral daily  Nephro-elicia 1 Tablet(s) Oral daily  pantoprazole  Injectable 40 milliGRAM(s) IV Push daily  polyethylene glycol 3350 17 Gram(s) Oral two times a day  sodium chloride 0.65% Nasal 1 Spray(s) Both Nostrils every 12 hours  sodium chloride 0.9%. 1000 milliLiter(s) (10 mL/Hr) IV Continuous <Continuous>  theophylline ER Capsule 100 milliGRAM(s) Oral <User Schedule>  traZODone 50 milliGRAM(s) Oral at bedtime      Vital Signs Last 24 Hrs  T(C): 36.3 (01-30-25 @ 12:00), Max: 37.2 (01-30-25 @ 08:00)  T(F): 97.3 (01-30-25 @ 12:00), Max: 98.9 (01-30-25 @ 08:00)  HR: 64 (01-30-25 @ 12:00) (64 - 94)  BP: 150/81 (01-30-25 @ 12:00) (126/66 - 200/90)  BP(mean): 110 (01-30-25 @ 12:00) (86 - 129)  RR: 23 (01-30-25 @ 12:00) (10 - 33)  SpO2: 100% (01-30-25 @ 12:00) (92% - 100%)    Physical Exam:    Constitutional well preserved,comfortable,pleasant    HEENT PERRLA EOMI,No pallor or icterus    No oral exudate or erythema    Neck trach    Chest Good AE,CTA    CVS  S1 S2     Abd soft BS normal No tenderness    back lesion- raised nodule with dscharge    Ext No cyanosis clubbing or edema    IV site no erythema tenderness or discharge    Joints no swelling or LOM    CNS AAO X 3 no focal    Lab Data:                          9.2    8.96  )-----------( 315      ( 30 Jan 2025 00:24 )             29.8       01-30    132[L]  |  95[L]  |  22  ----------------------------<  84  4.3   |  25  |  3.99[H]    Ca    8.4      30 Jan 2025 00:24  Phos  3.4     01-30  Mg     2.2     01-30    TPro  6.2  /  Alb  2.7[L]  /  TBili  0.5  /  DBili  x   /  AST  21  /  ALT  14  /  AlkPhos  129[H]  01-30          Bronchial  01-28-25   Commensal manjit consistent with body site  --    Moderate polymorphonuclear leukocytes per low power field  Numerous Squamous epithelial cells per low power field  Few Yeast like cells per oil power field      .Blood BLOOD  01-27-25   No growth at 48 Hours  --  --      Tissue  01-26-25   Rare Serratia marcescens  --  Serratia marcescens      Bronchial  01-24-25   Culture is being performed.  --  Enterococcus faecium (vancomycin resistant)      WBC Count: 8.96 (01-30-25 @ 00:24)  WBC Count: 7.37 (01-29-25 @ 00:58)  WBC Count: 8.40 (01-28-25 @ 00:24)  WBC Count: 10.28 (01-27-25 @ 00:54)  WBC Count: 11.39 (01-26-25 @ 00:27)  WBC Count: 9.97 (01-25-25 @ 00:32)  WBC Count: 10.07 (01-24-25 @ 00:26)       Bilirubin Total: 0.5 mg/dL (01-30-25 @ 00:24)  Aspartate Aminotransferase (AST/SGOT): 21 U/L (01-30-25 @ 00:24)  Alanine Aminotransferase (ALT/SGPT): 14 U/L (01-30-25 @ 00:24)  Alkaline Phosphatase: 129 U/L (01-30-25 @ 00:24)      < from: TTE W or WO Ultrasound Enhancing Agent (01.27.25 @ 12:42) >     CONCLUSIONS:      1. This was a limited study to assess left ventricular systolic function.   2. Left ventricular systolic function is normal with an ejection fraction of 60 % by Michaels's method of disks.   3. Moderately enlarged right ventricular cavity size and mild to moderately reduced right ventricular systolic function.   4. Moderate pericardial effusion noted adjacent to the right atrium.   5. There is no definitive evidence of RA diastolic collapse in the available views. The pericardium proximal to the RV free wall was not well visualized. Due to limited views (as above, and no subcostal views), the study is indeterminate for the presence of echocardiographic signs of tamponade.   6. Left atrium is severely dilated.   7. Compared to the transthoracic echocardiogram performed on 1/19/2025, this was also a limited study. The area proximal to the right atrial free wall is better visualized on the present study and the echofree space most consistent with a pericardial effusion is more evident. There is again visual evidence of right ventricular dysfunction.    ________________________________________________________________________________________    < end of copied text >

## 2025-01-30 NOTE — PROCEDURE NOTE - NSBRONCHHISTORY_GEN_A_CORE_FT
54M with CAD s/p CABG (12/30/24), ESRD with postop pneumonia with mucus plugging, E coli bacteremia. CAD s/p CABG  Recurrent left sided mucus plugging

## 2025-01-30 NOTE — PROCEDURE NOTE - NSBRONCHFINDINGS_GEN_A_CORE_FT
Tracheostomy tube in appropriate position.  Copious secretions in left mainstem and LLL bronchi  Edema and erythema of the LMB, LLL  Mild secretions on the right side with normal mucosa  There is no significant (>50%) collapse of the trachea, LMB or RMB with forceful breathing  No active bleeding.  No endobronchial lesions.  Tracheostomy tube in appropriate position.  Copious secretions in left mainstem extending into left lower lobe bronchi  Mild secretions noted on right side  No significant dynamic airway collapse of the trachea or the left and right mainstem bronchi  No active bleeding  No endobronchial lesions

## 2025-01-30 NOTE — PROGRESS NOTE ADULT - ASSESSMENT
55 y/o male with PMHx of hypertension, ESRD on HD, CAD (s/p PCI in 4/2024), chronic systolic heart failure, chronic ascites, recent admission for cholecystitis s/p cholecystectomy who was admitted for acute decompensated heart failure and found to have abnormal nuclear stress. LHC showed multi-vessel coronary disease, for which he underwent a CABG (LIMA - LAD | SVG - D1 | SVG - LPL) on 12/30/24. Post-op course complicated by shock requiring IABP + dobutamine, ESBL E. coli bacteremia, new-onset atrial fibrillation (1/6/25) for which pt underwent EDU/DCCV in CTU on 1/10/25. C/b ESBL pneumonia, collapsed Left Lung, s/p multiple bronch and tracheostomy 1/17, + VRE 1/22 E. Faecium Bcx. EP reconsulted for sinus bradycardia overnight/this AM.     1. vagally mediated bradycardia  2. CAD s/p CABG 12/30/24  3. new onset AF  4. respiratory failure s/p trach  5. Ecoli bacteremia  6. ESRD on HD    - Telemetry overnight with HR in the 50s  - No BP drop noted bradycardia today. Restarted on Dobutamine per CTU, management per CTU.   - Continue Theophylline 100mg BID for now.  - No indication for pacemaker at this time. Would ensure conservative measures taken to prevent episodes from occurring, regular suctioning, managing respiratory issues.   - Of note, pt is s/p EDU/DCCV on 1/10, currently not on AC. Additionally, mod pericardial effusion noted on TTE 1/27. Would restart AC when OK per CTSx  - Keep on tele. Keep K>4, Mg>2    Plan discussed with Dr. Steward   53 y/o male with PMHx of hypertension, ESRD on HD, CAD (s/p PCI in 4/2024), chronic systolic heart failure, chronic ascites, recent admission for cholecystitis s/p cholecystectomy who was admitted for acute decompensated heart failure and found to have abnormal nuclear stress. LHC showed multi-vessel coronary disease, for which he underwent a CABG (LIMA - LAD | SVG - D1 | SVG - LPL) on 12/30/24. Post-op course complicated by shock requiring IABP + dobutamine, ESBL E. coli bacteremia, new-onset atrial fibrillation (1/6/25) for which pt underwent EDU/DCCV in CTU on 1/10/25. C/b ESBL pneumonia, collapsed Left Lung, s/p multiple bronch and tracheostomy 1/17, + VRE 1/22 E. Faecium Bcx. EP reconsulted for sinus bradycardia overnight/this AM.     1. vagally mediated bradycardia  2. CAD s/p CABG 12/30/24  3. new onset AF  4. respiratory failure s/p trach  5. Ecoli bacteremia  6. ESRD on HD    - Telemetry overnight with HR in the 50s  - Continue Theophylline 100mg BID for now.  - No indication for pacemaker at this time. Would ensure conservative measures taken to prevent episodes from occurring, regular suctioning, managing respiratory issues.   - Of note, pt is s/p EDU/DCCV on 1/10, currently not on AC. Additionally, mod pericardial effusion noted on TTE 1/27. Would restart AC when OK per CTSx  - Keep on tele. Keep K>4, Mg>2    No further EP workup indicated at this time. Will sign off  Plan discussed with Dr. Steward

## 2025-01-31 LAB
ALBUMIN SERPL ELPH-MCNC: 2.9 G/DL — LOW (ref 3.3–5)
ALP SERPL-CCNC: 123 U/L — HIGH (ref 40–120)
ALT FLD-CCNC: 10 U/L — SIGNIFICANT CHANGE UP (ref 10–45)
ANION GAP SERPL CALC-SCNC: 14 MMOL/L — SIGNIFICANT CHANGE UP (ref 5–17)
AST SERPL-CCNC: 16 U/L — SIGNIFICANT CHANGE UP (ref 10–40)
BASOPHILS # BLD AUTO: 0.07 K/UL — SIGNIFICANT CHANGE UP (ref 0–0.2)
BASOPHILS NFR BLD AUTO: 0.7 % — SIGNIFICANT CHANGE UP (ref 0–2)
BILIRUB SERPL-MCNC: 0.5 MG/DL — SIGNIFICANT CHANGE UP (ref 0.2–1.2)
BUN SERPL-MCNC: 28 MG/DL — HIGH (ref 7–23)
CALCIUM SERPL-MCNC: 8.9 MG/DL — SIGNIFICANT CHANGE UP (ref 8.4–10.5)
CHLORIDE SERPL-SCNC: 91 MMOL/L — LOW (ref 96–108)
CO2 SERPL-SCNC: 25 MMOL/L — SIGNIFICANT CHANGE UP (ref 22–31)
EGFR: 13 ML/MIN/1.73M2 — LOW
EGFR: 13 ML/MIN/1.73M2 — LOW
EOSINOPHIL # BLD AUTO: 0.7 K/UL — HIGH (ref 0–0.5)
EOSINOPHIL NFR BLD AUTO: 7.4 % — HIGH (ref 0–6)
GLUCOSE SERPL-MCNC: 97 MG/DL — SIGNIFICANT CHANGE UP (ref 70–99)
GRAM STN FLD: ABNORMAL
HCT VFR BLD CALC: 28.7 % — LOW (ref 39–50)
HGB BLD-MCNC: 9 G/DL — LOW (ref 13–17)
IMM GRANULOCYTES NFR BLD AUTO: 0.4 % — SIGNIFICANT CHANGE UP (ref 0–0.9)
LYMPHOCYTES # BLD AUTO: 0.54 K/UL — LOW (ref 1–3.3)
LYMPHOCYTES # BLD AUTO: 5.7 % — LOW (ref 13–44)
MAGNESIUM SERPL-MCNC: 2.2 MG/DL — SIGNIFICANT CHANGE UP (ref 1.6–2.6)
MCHC RBC-ENTMCNC: 29.6 PG — SIGNIFICANT CHANGE UP (ref 27–34)
MCHC RBC-ENTMCNC: 31.4 G/DL — LOW (ref 32–36)
MCV RBC AUTO: 94.4 FL — SIGNIFICANT CHANGE UP (ref 80–100)
MONOCYTES NFR BLD AUTO: 10 % — SIGNIFICANT CHANGE UP (ref 2–14)
NEUTROPHILS # BLD AUTO: 7.11 K/UL — SIGNIFICANT CHANGE UP (ref 1.8–7.4)
NEUTROPHILS NFR BLD AUTO: 75.8 % — SIGNIFICANT CHANGE UP (ref 43–77)
NRBC # BLD: 0 /100 WBCS — SIGNIFICANT CHANGE UP (ref 0–0)
NRBC BLD-RTO: 0 /100 WBCS — SIGNIFICANT CHANGE UP (ref 0–0)
PHOSPHATE SERPL-MCNC: 4.4 MG/DL — SIGNIFICANT CHANGE UP (ref 2.5–4.5)
PLATELET # BLD AUTO: 278 K/UL — SIGNIFICANT CHANGE UP (ref 150–400)
POTASSIUM SERPL-MCNC: 4.8 MMOL/L — SIGNIFICANT CHANGE UP (ref 3.5–5.3)
POTASSIUM SERPL-SCNC: 4.8 MMOL/L — SIGNIFICANT CHANGE UP (ref 3.5–5.3)
PROT SERPL-MCNC: 6 G/DL — SIGNIFICANT CHANGE UP (ref 6–8.3)
RBC # BLD: 3.04 M/UL — LOW (ref 4.2–5.8)
RBC # FLD: 19.7 % — HIGH (ref 10.3–14.5)
SODIUM SERPL-SCNC: 130 MMOL/L — LOW (ref 135–145)
SPECIMEN SOURCE: SIGNIFICANT CHANGE UP
WBC # BLD: 9.4 K/UL — SIGNIFICANT CHANGE UP (ref 3.8–10.5)
WBC # FLD AUTO: 9.4 K/UL — SIGNIFICANT CHANGE UP (ref 3.8–10.5)

## 2025-01-31 PROCEDURE — 99233 SBSQ HOSP IP/OBS HIGH 50: CPT

## 2025-01-31 PROCEDURE — G0545: CPT

## 2025-01-31 PROCEDURE — 99291 CRITICAL CARE FIRST HOUR: CPT

## 2025-01-31 PROCEDURE — 71045 X-RAY EXAM CHEST 1 VIEW: CPT | Mod: 26

## 2025-01-31 PROCEDURE — 99232 SBSQ HOSP IP/OBS MODERATE 35: CPT

## 2025-01-31 PROCEDURE — 99222 1ST HOSP IP/OBS MODERATE 55: CPT

## 2025-01-31 RX ORDER — OXYCODONE HYDROCHLORIDE 30 MG/1
10 TABLET ORAL EVERY 4 HOURS
Refills: 0 | Status: DISCONTINUED | OUTPATIENT
Start: 2025-01-31 | End: 2025-02-03

## 2025-01-31 RX ORDER — PREGABALIN 50 MG/1
50 CAPSULE ORAL EVERY 8 HOURS
Refills: 0 | Status: DISCONTINUED | OUTPATIENT
Start: 2025-01-31 | End: 2025-02-03

## 2025-01-31 RX ORDER — PREGABALIN 50 MG/1
25 CAPSULE ORAL
Refills: 0 | Status: DISCONTINUED | OUTPATIENT
Start: 2025-01-31 | End: 2025-01-31

## 2025-01-31 RX ORDER — OXYCODONE HYDROCHLORIDE 30 MG/1
5 TABLET ORAL EVERY 4 HOURS
Refills: 0 | Status: DISCONTINUED | OUTPATIENT
Start: 2025-01-31 | End: 2025-02-03

## 2025-01-31 RX ORDER — THEOPHYLLINE ANHYDROUS 200 MG
50 TABLET, EXTENDED RELEASE 12 HR ORAL EVERY 6 HOURS
Refills: 0 | Status: DISCONTINUED | OUTPATIENT
Start: 2025-01-31 | End: 2025-02-02

## 2025-01-31 RX ADMIN — INSULIN LISPRO 2: 100 INJECTION, SOLUTION INTRAVENOUS; SUBCUTANEOUS at 18:40

## 2025-01-31 RX ADMIN — Medication 1 TABLET(S): at 12:12

## 2025-01-31 RX ADMIN — Medication 650 MILLIGRAM(S): at 13:00

## 2025-01-31 RX ADMIN — ACETYLCYSTEINE 4 MILLILITER(S): 200 INHALANT RESPIRATORY (INHALATION) at 23:14

## 2025-01-31 RX ADMIN — METHOCARBAMOL 500 MILLIGRAM(S): 500 TABLET, FILM COATED ORAL at 21:22

## 2025-01-31 RX ADMIN — OXYCODONE HYDROCHLORIDE 10 MILLIGRAM(S): 30 TABLET ORAL at 01:30

## 2025-01-31 RX ADMIN — Medication 15 MILLILITER(S): at 18:09

## 2025-01-31 RX ADMIN — PREGABALIN 50 MILLIGRAM(S): 50 CAPSULE ORAL at 21:26

## 2025-01-31 RX ADMIN — Medication 2.5 MILLIGRAM(S): at 23:14

## 2025-01-31 RX ADMIN — Medication 40 MILLIGRAM(S): at 12:12

## 2025-01-31 RX ADMIN — ATORVASTATIN CALCIUM 80 MILLIGRAM(S): 80 TABLET, FILM COATED ORAL at 21:23

## 2025-01-31 RX ADMIN — OXYCODONE HYDROCHLORIDE 5 MILLIGRAM(S): 30 TABLET ORAL at 05:30

## 2025-01-31 RX ADMIN — Medication 15 MILLILITER(S): at 05:27

## 2025-01-31 RX ADMIN — Medication 650 MILLIGRAM(S): at 12:16

## 2025-01-31 RX ADMIN — Medication 1 APPLICATION(S): at 21:27

## 2025-01-31 RX ADMIN — Medication 50 MILLIGRAM(S): at 21:22

## 2025-01-31 RX ADMIN — Medication 50 MILLIGRAM(S): at 23:48

## 2025-01-31 RX ADMIN — Medication 2.5 MILLIGRAM(S): at 05:18

## 2025-01-31 RX ADMIN — ACETYLCYSTEINE 4 MILLILITER(S): 200 INHALANT RESPIRATORY (INHALATION) at 17:34

## 2025-01-31 RX ADMIN — Medication 5 MILLIGRAM(S): at 21:22

## 2025-01-31 RX ADMIN — Medication 2.5 MILLIGRAM(S): at 17:35

## 2025-01-31 RX ADMIN — MEROPENEM 100 MILLIGRAM(S): 1 INJECTION INTRAVENOUS at 05:46

## 2025-01-31 RX ADMIN — Medication 650 MILLIGRAM(S): at 18:19

## 2025-01-31 RX ADMIN — DOBUTAMINE 2.55 MICROGRAM(S)/KG/MIN: 250 INJECTION INTRAVENOUS at 21:22

## 2025-01-31 RX ADMIN — ACETYLCYSTEINE 4 MILLILITER(S): 200 INHALANT RESPIRATORY (INHALATION) at 05:18

## 2025-01-31 RX ADMIN — LINEZOLID 300 MILLIGRAM(S): 2 INJECTION, SOLUTION INTRAVENOUS at 00:54

## 2025-01-31 RX ADMIN — LIDOCAINE AND PRILOCAINE 1 APPLICATION(S): 25; 25 CREAM TOPICAL at 06:54

## 2025-01-31 RX ADMIN — Medication 81 MILLIGRAM(S): at 12:12

## 2025-01-31 RX ADMIN — OXYCODONE HYDROCHLORIDE 10 MILLIGRAM(S): 30 TABLET ORAL at 01:00

## 2025-01-31 RX ADMIN — ACETYLCYSTEINE 4 MILLILITER(S): 200 INHALANT RESPIRATORY (INHALATION) at 11:06

## 2025-01-31 RX ADMIN — MIRTAZAPINE 7.5 MILLIGRAM(S): 30 TABLET, FILM COATED ORAL at 12:12

## 2025-01-31 RX ADMIN — Medication 1 SPRAY(S): at 18:09

## 2025-01-31 RX ADMIN — Medication 1 SPRAY(S): at 05:27

## 2025-01-31 RX ADMIN — Medication 1 APPLICATION(S): at 05:28

## 2025-01-31 RX ADMIN — Medication 500 MILLIGRAM(S): at 12:12

## 2025-01-31 RX ADMIN — EPOETIN ALFA 10000 UNIT(S): 10000 SOLUTION INTRAVENOUS; SUBCUTANEOUS at 14:55

## 2025-01-31 RX ADMIN — HEPARIN SODIUM 5000 UNIT(S): 1000 INJECTION INTRAVENOUS; SUBCUTANEOUS at 21:22

## 2025-01-31 RX ADMIN — METHOCARBAMOL 500 MILLIGRAM(S): 500 TABLET, FILM COATED ORAL at 13:14

## 2025-01-31 RX ADMIN — Medication 50 MILLIGRAM(S): at 18:09

## 2025-01-31 RX ADMIN — OXYCODONE HYDROCHLORIDE 5 MILLIGRAM(S): 30 TABLET ORAL at 06:00

## 2025-01-31 RX ADMIN — HEPARIN SODIUM 5000 UNIT(S): 1000 INJECTION INTRAVENOUS; SUBCUTANEOUS at 13:14

## 2025-01-31 RX ADMIN — GABAPENTIN 100 MILLIGRAM(S): 400 CAPSULE ORAL at 05:27

## 2025-01-31 RX ADMIN — HEPARIN SODIUM 5000 UNIT(S): 1000 INJECTION INTRAVENOUS; SUBCUTANEOUS at 05:27

## 2025-01-31 RX ADMIN — Medication 2.5 MILLIGRAM(S): at 11:06

## 2025-01-31 RX ADMIN — LINEZOLID 300 MILLIGRAM(S): 2 INJECTION, SOLUTION INTRAVENOUS at 13:31

## 2025-01-31 RX ADMIN — Medication 108.6 MILLIGRAM(S): at 21:27

## 2025-01-31 RX ADMIN — METHOCARBAMOL 500 MILLIGRAM(S): 500 TABLET, FILM COATED ORAL at 05:27

## 2025-01-31 RX ADMIN — Medication 50 MILLIGRAM(S): at 12:12

## 2025-01-31 RX ADMIN — Medication 5 MILLIGRAM(S): at 18:08

## 2025-01-31 NOTE — PROGRESS NOTE ADULT - ASSESSMENT
54M with PMH of HTN, HLD, ESRD on HD MWF via LUE AVF, ascites (requiring repeat paracenteses q4-6wks), NICM with moderate LV dysfunction w/ EF 35-40%() and CAD s/p atherectomy + SALLIE to D1(2024) presents to [facility] transferred from [facility]. Patient initially presented to [facility] ED with shortness of breath. Of note he was recently admitted from - for acute cholecystitis s/p cholecystectomy. In [facility] ED, pt was hypoxic to 86% on RA, trop 1.7K, EKG no new changes, CXR fluid overload. Admitted for AHRF 2/2 fluid overload. Pt received HD on , ,  and . DAPT was resumed, TTE showed improvement in LVEF to 40-45%. Stress test was abnormal with 1mm inferior STD, reversible ischemia in the inferior wall and fixed defects in the lateral, anterior and apical walls with post stress EF of 24%.     Pt was then transferred to [facility] for cardiac cath which showed pLAD 70% ISR, mLCx 70%, D1 severe dz. Patient now transferred to [facility] CTS Dr. Rivers for CABG eval.# CAD s/p NSTEMI  Hx CAD s/p PCI LAD   Presented with ADHF elevated troponins  Positive NST1-Cath- pLAD 70% ISR, mLCx 70%, D1 severe dz.   TTE EF 35% Severe TRWas on Plavix-p2y12- 321 been off Plavix since -POD#1-C3L free LIMA-LAD; SVG-Diag; HAY-kdclKW-Pdsxmarih on  Sinus rhythm,Amio for AF prophylaxis  -OOB off  Sinus rhythm   -POD#3-On /pressors-EF 25-30% RV failure with transaminitis-Sinus Rcejat75/03-POD#4-Was intubated for hypoxia-gradual hypotension on /pressors had IABP inserted this morning  -POD#5-s/p IABP insertion, sepsis/septic shock due to GNR/ECOLI HD cath placed   Bronched yesterday 1/3 - minimal secretions with rust-tinged sputum   ESBL-E Coli bacteremia on meropenam    - POD#7 Atrial Fib ,remains intubated weaning IABP 1:3,off pressors on CRRT  -IABP removed.Remains on vent-Alex 10ppm,off Levo- CVP 10 / MAP 71 / Hct 25.6% / Lactate 1.7-Atrial Fibrillation  -Intubated on Alex 10ppm, gtt, BP better-AF~~90's on Amio-had bronch still febrile Tmax 82634/-Extubated awake alert on HFNC AF,on Dobutrex-CRRT,Cultures no growth    01/10-OOB on BiPAP Sinus Rhythm on -SR/ MAP 57/ CVP 10/  Hct 26.7 / Lact 1.4  -s/p EDU/DCCV-Sinus rhythm on -SR / MAP 52 / CVP 12/ Hct 25.8 / Lact 0.7-had bronch with BAL Left lung  -Sinus rhythm on -for repeat Bronch-SR / MAP 67 / CVP 11 / Hct 27.4% / Lact 0.8  -s/p Trach on  TTE EF 55%-SR / CVP 2 / MAP 63 / Hct 25.9% / Lactate 0.9  -Awake on Trach collar off  c/o buttock pain had bronch yesterday-BAL-Sinus rhythm  -Sinus rhythm on vent at night-BP stable may need to increase hydrallazine 25 mg q8  -TTE EF 60% still needs Vent support at night Bradycardic trial of Bryce now back on   -Awake alert Sinus rhythm BP elevated  remains on ,Nocturnal vent support  resume Hydralazine 25mg q8          # Acute on Chronic Systolic Heart Failure now improved  EF-35% ,severe TR  Had HD post op  GDMT post op  LVEF improved post bypass but now at 23-30%-BiV dysfunction on  now off pressors  -TTE EF 40%,trace effusion  ECG c/w pericarditis-was on colchicine   01/15-TTE EF 55%  -TTE EF 60%     Vascular evaluated  AVGraft /?steal causing RV failure-'' Very low suspicion of steal Sd and AVF causing current clinical state. ''   VA Duplex Hemodialysis Access, Left (25 @ 13:35) >   Arterial flow volume of 1301 mL/m, and the venous flow volume of  1492 mL/m are consistent with a normally functioning dialysis access  fistula.      # Ascites  Hx chronic ascites  Has drainage q4-6 weeks  Last drained -4600mL  ? ascites related to severe TR  Had qvzphpxqvdek-Gpwygdh-nc growth   CT Abdomen 01/10-Large volume ascites-consider paracentesis  Monitor      # ESRD  Now off CRRT transition to HD

## 2025-01-31 NOTE — PROGRESS NOTE ADULT - ASSESSMENT
54M with HTN, HLD, ESRD on HD MWF via LUE AVF, ascites (requiring repeat paracenteses q4-6wks), NICM with moderate LV dysfunction w/ EF 35-40%() and CAD s/p atherectomy + SALLIE to D1 (2024) presents to [facility] 2024 as transfer from [facility] (via [facility]) where he presented  with SOB.   In [facility] ED, pt was hypoxic to 86% on RA, trop 1.7K, EKG no new changes, CXR fluid overload. Admitted for AHRF 2/2 fluid overload. Pt received HD on , ,  and . DAPT was resumed, TTE showed improvement in LVEF to 40-45%. Stress test was abnormal with 1mm inferior STD, reversible ischemia in the inferior wall and fixed defects in the lateral, anterior and apical walls with post stress EF of 24%. Pt was then transferred to [facility] for cardiac cath which showed pLAD 70% ISR, mLCx 70%, D1 severe dz. Patient now transferred to [facility] 2024 for CABG.       - underwent  C3L free LIMA-LAD; SVG-Diag; SVG-leftPL   - extubated   - hypotension and ECHO showing Systolic HF/depressed BIV Function, currently supported with /pressors.  Labs this evening showing transaminitis   - hypotensive, febrile and reintubated  1/3 - cultures (+) E coli +ESBL bacteremia   - underwent tracheostomy   - left lung collapse s/p bronchoscopy   - seen by plastic surgery / colorectal for sacral wound, no surgical intervention, no evidence of fistula, BC sent  - c/o right arm  pain, started on acyclovir for positive HSV PCR     doing a little better tissue culture with serratia; bronch also growing some yeast and VREC   back with nodule with some discharge    sacral decubitus less tender, some bloody secretions from trach    Recommendations  - continue linezolid- day # 9 since last positive blood culture () - will likely treat for 14 days ( )) . follow cbc   - repeat BC  so far negative  - can continue meropenem for now (tissue culture with serratia, taken from back)- would treat for 10 days from the positive culture ( )  -->   - f/u surgery- to look at back and decubitus  - yeast in bronch, not clear if true infection, monitor off antifungal for now  - acyclovir, dosed for renal insufficiency  - await colorectal follow up for ? fluid collection / phlegmon    Marla Champion M.D. ,   please reach via teams   If no answer, or after 5PM/ weekends,  then please call  [phone]    Assessment and plan discussed with the primary team .    ID service will be covering over the weekend. Please call for acute issues or questions. [phone]

## 2025-01-31 NOTE — PROGRESS NOTE ADULT - SUBJECTIVE AND OBJECTIVE BOX
Patient seen and examined at the bedside.    Remains critically ill on continuous ICU monitoring.      Brief Summary:  55 yo M with multiple medical problems including CAD s/p PCI in April 2024 s/p CABG x 3 on 12/30/24 1/2: Intubated for hypoxia & left lung white out s/p awake bronch with removal of copious mucopurulent secretions  1/4: s/p IABP insertion, sepsis/septic shock due to E coli   ESBL pneumonia, collapsed Left Lung, s/p multiple bronch   1/18 tracheostomy tube placement   1/22 VRE E. Faecium Bcx  1/24 +HSV PCR BAL  1/26 tissue cx from back abscess - Serratia   1/27-29 - intermittent bradycardia/junctional episodes with hypotension    24 Hour events:  TC during the day, PC at night   No acute events   Did not have recurrent bradyarrhythmia     Objective:  Vital Signs Last 24 Hrs  T(C): 37.2 (31 Jan 2025 04:00), Max: 37.3 (31 Jan 2025 00:00)  T(F): 99 (31 Jan 2025 04:00), Max: 99.1 (31 Jan 2025 00:00)  HR: 87 (31 Jan 2025 06:00) (64 - 92)  BP: 142/65 (31 Jan 2025 06:00) (120/60 - 181/81)  BP(mean): 94 (31 Jan 2025 06:00) (86 - 117)  RR: 12 (31 Jan 2025 06:00) (9 - 33)  SpO2: 100% (31 Jan 2025 06:00) (94% - 100%)    Parameters below as of 31 Jan 2025 05:36  Patient On (Oxygen Delivery Method): tracheostomy collar    Mode: AC/ CMV (Assist Control/ Continuous Mandatory Ventilation)  RR (machine): 22  FiO2: 40  PEEP: 10  ITime: 1  MAP: 15  PC: 12  PIP: 22    Physical Exam:  General: Alert and interactive,   Neurology: Oriented, following commands. ambulates  Respiratory: Breath sounds bilaterally, trach collar  CV: Sinus  Abdominal: Soft, Nontender  Extremities: Warm, well-perfused  Back: open abscess/wound to right paralumbar area, indurated, no fluctuance, no crepitus; sacral wound   Right arm tender, but non-infectious appearing  -------------------------------------------------------------------------------------------------------------------------------    Labs:                        9.0    9.40  )-----------( 278      ( 31 Jan 2025 00:30 )             28.7     01-31    130[L]  |  91[L]  |  28[H]  ----------------------------<  97  4.8   |  25  |  5.05[H]    Ca    8.9      31 Jan 2025 00:30  Phos  4.4     01-31  Mg     2.2     01-31    TPro  6.0  /  Alb  2.9[L]  /  TBili  0.5  /  DBili  x   /  AST  16  /  ALT  10  /  AlkPhos  123[H]  01-31    LIVER FUNCTIONS - ( 31 Jan 2025 00:30 )  Alb: 2.9 g/dL / Pro: 6.0 g/dL / ALK PHOS: 123 U/L / ALT: 10 U/L / AST: 16 U/L / GGT: x         ------------------------------------------------------------------------------------------------------------------------------  Assessment:  55 yo M s/p CABG x 3 on 12/30/24    Postop acute respiratory failure  Acute blood loss anemia  Ascites    ESRD on iHD   E coli bacteremia 2/2 pneumonia  VRE E. Faecium   HSV infection  Tracheomalacia     Plan:  ***Neuro***  Postoperative acute pain control with Gabapentin, Robaxin, Tylenol, Oxycodone prn  Trazodone nightly  PT ongoing    ***Cardiovascular***  s/p CABG x 3  A fib s/p cardioversion  Intermittent bradycardia/junction with hypotension - started on Theophyline - EP eval   Dobutamine for chronotropy   TTE 1/27 LVEF 60%, mod RA pericardial effusion   ASA / Statin daily    ***Pulmonary***  Postoperative acute respiratory failure  Tracheostomy 1/18   s/p Bronchoscopy 1/18. 1/20, 1/21, 1/24, 1/28  Mucomyst albuterol  Left lung PNA, + e coli ESBL - cleared but now with VRE   Tolerates trach collar with high flow, keep on a vent at night  On HT 3% nebs and albuterol  Bronchoscopy yesterday by pulmonology    ***GI***  On Tube feeds at goal, change to vital per nutrition consult  Protonix for stress ulcer prophylaxis.   Ascites: Last paracentesis 1/11    ***Renal***  ESRD - tolerating iHD (MWF)   Nephro-Lisette for renal support    ***ID***  Monitor cultures  1/28 BAL - commensal manjit   1/24 VRE BAL - Linezolid   1/24 Acyclovir for + HSV in BAL  1/26 serratia on tissue cx - Meropenem until 2/2  hx of ESBL pneumonia - cleared with Meropenem     ***Endocrine***  Hyperglycemia - Insulin sliding scale    ***Hematology***  Acute blood loss anemia  CBC, coags  Continue  Epogen.  Heparin SQ for DVT  Needs full AC s/p cardioversion      *** Lines ***  RUE Midline  RUE PIV          Care plan discussed with the ICU care team.   Patient remains critical, at risk for life threatening decompensation.    I have spent 30 minutes providing critical care management to this patient.    By signing my name below, I, Baldemar Hernandez, attest that this documentation has been prepared under the direction and in the presence of Blaze Finch MD.  Electronically signed: Evelio Sevilal 01-31-25 @ 06:43    I, Blaze Finch MD,  personally performed the services described in this documentation. All medical record entries made by the scribe were at my direction and in my presence. I have reviewed the chart and agree that the record reflects my personal performance and is accurate and complete  Electronically signed: Blaze Finch MD. Patient seen and examined at the bedside.    Remains critically ill on continuous ICU monitoring.      Brief Summary:  55 yo M with multiple medical problems including CAD s/p PCI in April 2024 s/p CABG x 3 on 12/30/24 1/2: Intubated for hypoxia & left lung white out s/p awake bronch with removal of copious mucopurulent secretions  1/4: s/p IABP insertion, sepsis/septic shock due to E coli   ESBL pneumonia, collapsed Left Lung, s/p multiple bronch   1/18 tracheostomy tube placement   1/22 VRE E. Faecium Bcx  1/24 +HSV PCR BAL  1/26 tissue cx from back abscess - Serratia   1/27-29 - intermittent bradycardia/junctional episodes with hypotension    24 Hour events:  TC during the day   Did not have recurrent bradyarrhythmia   Remains on dobutamine     Objective:  Vital Signs Last 24 Hrs  T(C): 37.2 (31 Jan 2025 04:00), Max: 37.3 (31 Jan 2025 00:00)  T(F): 99 (31 Jan 2025 04:00), Max: 99.1 (31 Jan 2025 00:00)  HR: 87 (31 Jan 2025 06:00) (64 - 92)  BP: 142/65 (31 Jan 2025 06:00) (120/60 - 181/81)  BP(mean): 94 (31 Jan 2025 06:00) (86 - 117)  RR: 12 (31 Jan 2025 06:00) (9 - 33)  SpO2: 100% (31 Jan 2025 06:00) (94% - 100%)    Parameters below as of 31 Jan 2025 05:36  Patient On (Oxygen Delivery Method): tracheostomy collar    Mode: AC/ CMV (Assist Control/ Continuous Mandatory Ventilation)  RR (machine): 22  FiO2: 40  PEEP: 10  ITime: 1  MAP: 15  PC: 12  PIP: 22    Physical Exam:  General: Alert and interactive,   Neurology: Oriented, following commands. ambulates  Respiratory: Breath sounds bilaterally, trach collar  CV: Sinus  Abdominal: Soft, Nontender  Extremities: Warm, well-perfused  Back: open abscess/wound to right paralumbar area, indurated, no fluctuance, no crepitus; sacral wound   Right arm tender, but non-infectious appearing  -------------------------------------------------------------------------------------------------------------------------------    Labs:                        9.0    9.40  )-----------( 278      ( 31 Jan 2025 00:30 )             28.7     01-31    130[L]  |  91[L]  |  28[H]  ----------------------------<  97  4.8   |  25  |  5.05[H]    Ca    8.9      31 Jan 2025 00:30  Phos  4.4     01-31  Mg     2.2     01-31    TPro  6.0  /  Alb  2.9[L]  /  TBili  0.5  /  DBili  x   /  AST  16  /  ALT  10  /  AlkPhos  123[H]  01-31    LIVER FUNCTIONS - ( 31 Jan 2025 00:30 )  Alb: 2.9 g/dL / Pro: 6.0 g/dL / ALK PHOS: 123 U/L / ALT: 10 U/L / AST: 16 U/L / GGT: x         ------------------------------------------------------------------------------------------------------------------------------  Assessment:  55 yo M s/p CABG x 3 on 12/30/24    Postop acute respiratory failure  Acute blood loss anemia  Ascites    ESRD on iHD   E coli bacteremia 2/2 pneumonia  VRE E. Faecium   HSV infection  Tracheomalacia     Plan:  ***Neuro***  Postoperative acute pain control with Gabapentin, Robaxin, Tylenol, Oxycodone prn  Trazodone nightly  PT ongoing    ***Cardiovascular***  s/p CABG x 3  A fib s/p cardioversion  Intermittent bradycardia/junction with hypotension - started on Theophyline - EP eval   Dobutamine for chronotropy   TTE 1/27 LVEF 60%, mod RA pericardial effusion   ASA / Statin daily    ***Pulmonary***  Postoperative acute respiratory failure  Tracheostomy 1/18   s/p Bronchoscopy 1/18. 1/20, 1/21, 1/24, 1/28  Mucomyst albuterol  Left lung PNA, + e coli ESBL - cleared but now with VRE   Tolerates trach collar with high flow, keep on a vent at night  On HT 3% nebs and albuterol  Bronchoscopy yesterday by pulmonology    ***GI***  On Tube feeds at goal, change to vital per nutrition consult  Protonix for stress ulcer prophylaxis.   Ascites: Last paracentesis 1/11    ***Renal***  ESRD - tolerating iHD (MWF)   Nephro-Lisette for renal support    ***ID***  Monitor cultures  1/28 BAL - commensal manjit   1/24 VRE BAL - Linezolid   1/24 Acyclovir for + HSV in BAL  1/26 serratia on tissue cx - Meropenem until 2/2  hx of ESBL pneumonia - cleared with Meropenem     ***Endocrine***  Hyperglycemia - Insulin sliding scale    ***Hematology***  Acute blood loss anemia  CBC, coags  Continue  Epogen.  Heparin SQ for DVT  Needs full AC s/p cardioversion      *** Lines ***  RUE Midline  RUE PIV          Care plan discussed with the ICU care team.   Patient remains critical, at risk for life threatening decompensation.    I have spent 32 minutes providing critical care management to this patient.    By signing my name below, I, Baldemar Hernandez, attest that this documentation has been prepared under the direction and in the presence of Blaze Finch MD.  Electronically signed: Evelio Sevilla 01-31-25 @ 06:43    I, Blaze Finch MD,  personally performed the services described in this documentation. All medical record entries made by the scribe were at my direction and in my presence. I have reviewed the chart and agree that the record reflects my personal performance and is accurate and complete  Electronically signed: Blaze Finch MD.

## 2025-01-31 NOTE — CONSULT NOTE ADULT - ASSESSMENT
54M with PMH of HTN, HLD, ESRD on HD MWF via LUE AVF, ascites (requiring repeat paracenteses q4-6wks), NICM with moderate LV dysfunction w/ EF 35-40%(2023) and CAD s/p atherectomy + SALLIE to D1(4/11/2024) presents to [facility] transferred from [facility]. Patient initially presented to [facility] ED with shortness of breath. Of note he was recently admitted from 09/27-12/02 for acute cholecystitis s/p cholecystectomy. In [facility] ED, pt was hypoxic to 86% on RA, trop 1.7K, EKG no new changes, CXR fluid overload. Admitted for AHRF 2/2 fluid overload. Pt received HD on 12/18, 12/19, 12/20 and 12/22. DAPT was resumed, TTE showed improvement in LVEF to 40-45%. Stress test was abnormal with 1mm inferior STD, reversible ischemia in the inferior wall and fixed defects in the lateral, anterior and apical walls with post stress EF of 24%. Pt was then transferred to [facility] for cardiac cath which showed pLAD 70% ISR, mLCx 70%, D1 severe dz. Patient now transferred to [facility] CTS Dr. Rivers for CABG eval. Patient denies any current SOB or chest pain on admission.    12/30/24 S/P CABG x 3    1/2: Intubated for hypoxia & left lung white out s/p awake bronch with removal of copious mucopurulent secretions  1/4: S/P IABP insertion, sepsis/septic shock due to E coli, ESBL pneumonia, collapsed Left Lung, s/p multiple bronch   1/18: tracheostomy tube placement   1/22: VRE E. Faecium Bcx  1/24: +HSV PCR BAL  1/26: tissue cx from back abscess - Serratia   1/27-29: intermittent bradycardia/junctional episodes with hypotension    Current out- patient pain regimen: none  Out Patient Pain Management provider: none  [facility] Prescription Monitoring Program: Reference #[id number]    Current Pain Score: 0/10    Pt with sacral pain and burning, states good effect with Oxy.    Continue Oxy IR 5 mg moderate pain and 10 mg severe pain, change to Q 4 hours PRN.  May be helpful to switch to Oxy solution.  Discontinue neurontin and start lyrica 50 mg Q 8 hours- current CrCl is 19.8 and max daily dose is 150 mg.  Continue robaxin 500 mg Q 8 hours.  Continue trazodone 50 mg Q HS.    Monitor for sedation and respiratory depression.  Bowel regimen.  Incentive spirometer.  OOB/ PT per primary team.    Discharge with a narcan rescue kit (naloxone 4 mg/ 0.1 ml nasal spray- 1 spray Q 2-3 minutes alternating between nostrils).  If continued pain management is needed after discharge, can see Dr Paul Spencer [phone].      Minutes spent on total encounter: 60 minutes      [facility]  [phone]

## 2025-01-31 NOTE — CONSULT NOTE ADULT - SUBJECTIVE AND OBJECTIVE BOX
Chief Complaint:  Patient is a 54y old  Male who presents with a chief complaint of CAD s/p CABG (30 Jan 2025 06:01)    HPI:  54M with PMH of HTN, HLD, ESRD on HD MWF via LUE AVF, ascites (requiring repeat paracenteses q4-6wks), NICM with moderate LV dysfunction w/ EF 35-40%(2023) and CAD s/p atherectomy + SALLIE to D1(4/11/2024) presents to [facility] transferred from [facility]. Patient initially presented to [facility] ED with shortness of breath. Of note he was recently admitted from 09/27-12/02 for acute cholecystitis s/p cholecystectomy. In [facility] ED, pt was hypoxic to 86% on RA, trop 1.7K, EKG no new changes, CXR fluid overload. Admitted for AHRF 2/2 fluid overload. Pt received HD on 12/18, 12/19, 12/20 and 12/22. DAPT was resumed, TTE showed improvement in LVEF to 40-45%. Stress test was abnormal with 1mm inferior STD, reversible ischemia in the inferior wall and fixed defects in the lateral, anterior and apical walls with post stress EF of 24%. Pt was then transferred to [facility] for cardiac cath which showed pLAD 70% ISR, mLCx 70%, D1 severe dz. Patient now transferred to [facility] CTS Dr. Rivers for CABG eval. Patient denies any current SOB or chest pain on admission.    12/30/24 S/P CABG x 3    1/2: Intubated for hypoxia & left lung white out s/p awake bronch with removal of copious mucopurulent secretions  1/4: S/P IABP insertion, sepsis/septic shock due to E coli, ESBL pneumonia, collapsed Left Lung, s/p multiple bronch   1/18: tracheostomy tube placement   1/22: VRE E. Faecium Bcx  1/24: +HSV PCR BAL  1/26: tissue cx from back abscess - Serratia   1/27-29: intermittent bradycardia/junctional episodes with hypotension    Current Pain Score: 0/10    Current out- patient pain regimen: none    Out Patient Pain Management provider: none    [facility] Prescription Monitoring Program: Reference #[id number]      PAST MEDICAL & SURGICAL HISTORY:  Type 2 diabetes mellitus      Hypertension      End stage renal disease  started HD 2/2019 T, Th, Sat via right chest permacath      Bone spur  right shoulder- hx of - sx done      Anemia      Injury of right wrist  hx of at age 14      History of hyperkalemia  before HD- K-6.2 - to repeat in am of sx, pt had HD today      S/P arthroscopy of right shoulder  2010      History of vascular access device  right chest permacath - 2/2019      H/O right wrist surgery  tendons repair - at age 14      AV fistula      H/O ventral hernia repair      S/P cholecystectomy        FAMILY HISTORY:  FH: hypertension  mother, father- alive    FH: type 2 diabetes  mother, father- alive      SOCIAL HISTORY:  Tobacco Use: denies  Alcohol Use: denies  Recreational Marijuana: denies  Illicit Drug Use: denies    Opioid Risk Tool (ORT-OUD) Score: low      Allergies    No Known Allergies    Intolerances        REVIEW OF SYSTEMS:  CONSTITUTIONAL: No fever, weight loss, fatigue, falls  NEURO: No headaches, memory loss, tremors, dizziness or blurred vision  RESP: No shortness of breath, cough  CV: No chest pain, palpitations  GI: No abdominal pain, nausea, vomiting, diarrhea, constipation  : No urinary incontinence/retention, dysuria  MSK: No joint pain; No neck or back pain; no upper or lower motor strength weakness   SKIN: No itching, burning, rashes;  + sacral pressure inijury  PSYCHIATRIC: No depression, anxiety, mood swings, or difficulty sleeping      MEDICATIONS  (STANDING):  acetylcysteine 10%  Inhalation 4 milliLiter(s) Inhalation every 6 hours  acyclovir IVPB 430 milliGRAM(s) IV Intermittent <User Schedule>  albuterol    0.083% 2.5 milliGRAM(s) Nebulizer every 6 hours  ascorbic acid 500 milliGRAM(s) Oral daily  aspirin  chewable 81 milliGRAM(s) Oral daily  atorvastatin 80 milliGRAM(s) Oral at bedtime  chlorhexidine 0.12% Liquid 15 milliLiter(s) Oral Mucosa every 12 hours  chlorhexidine 2% Cloths 1 Application(s) Topical daily  chlorhexidine 4% Liquid 1 Application(s) Topical <User Schedule>  dextrose 50% Injectable 50 milliLiter(s) IV Push every 15 minutes  DOBUTamine Infusion 2 MICROgram(s)/kG/Min (5.11 mL/Hr) IV Continuous <Continuous>  epoetin ignacia (EPOGEN) Injectable 39507 Unit(s) IV Push <User Schedule>  heparin   Injectable 5000 Unit(s) SubCutaneous every 8 hours  insulin lispro (ADMELOG) corrective regimen sliding scale   SubCutaneous every 6 hours  lidocaine   4% Patch 1 Patch Transdermal daily  linezolid  IVPB      linezolid  IVPB 600 milliGRAM(s) IV Intermittent every 12 hours  melatonin 5 milliGRAM(s) Oral at bedtime  meropenem  IVPB 500 milliGRAM(s) IV Intermittent every 24 hours  methocarbamol 500 milliGRAM(s) Oral every 8 hours  mirtazapine 7.5 milliGRAM(s) Oral daily  Nephro-elicia 1 Tablet(s) Oral daily  pantoprazole  Injectable 40 milliGRAM(s) IV Push daily  polyethylene glycol 3350 17 Gram(s) Oral two times a day  pregabalin 50 milliGRAM(s) Oral every 8 hours  sodium chloride 0.65% Nasal 1 Spray(s) Both Nostrils every 12 hours  sodium chloride 0.9%. 1000 milliLiter(s) (10 mL/Hr) IV Continuous <Continuous>  theophylline Syrup 50 milliGRAM(s) Oral every 6 hours  traZODone 50 milliGRAM(s) Oral at bedtime    MEDICATIONS  (PRN):  acetaminophen     Tablet .. 650 milliGRAM(s) Oral every 6 hours PRN Mild Pain (1 - 3)  lidocaine/prilocaine Cream 1 Application(s) Topical daily PRN pre-HD  oxyCODONE    IR 5 milliGRAM(s) Oral every 6 hours PRN Moderate Pain (4 - 6)  oxyCODONE    IR 10 milliGRAM(s) Oral every 6 hours PRN Severe Pain (7 - 10)  sodium chloride 0.9% lock flush 10 milliLiter(s) IV Push every 1 hour PRN Pre/post blood products, medications, blood draw, and to maintain line patency      PHYSICAL EXAM  GENERAL: seen at bedside; NAD; well developed, well groomed; no signs of toxicity  HEENT: head atraumatic, normocephalic; anicteric; mouthing words/ writing on dry erase board  NEURO: A + O X 3; good concentration; Cranial Nerves II- XII intact   GI: abdomen soft, non distended; + bowel sounds; TF in progress, utilizing tube for meds  : ESRD on HD  EXTREMITIES/ PV: ANAYA; 2+ peripheral pulses; no cyanosis, edema or clubbing  MUSCULOSKELETAL: motor strength 5/5 B/L UE, LE  SKIN: no rashes, lesions; pressure injury on sacrum/ buttocks  PSYCH: affect normal; good eye contact; no signs of depression or anxiety  Vital Signs:  T(C): 37 (01-31-25 @ 08:00)  HR: 85 (01-31-25 @ 15:17)  BP: 155/72 (01-31-25 @ 15:00)  RR: 18 (01-31-25 @ 15:00)  SpO2: 100% (01-31-25 @ 15:17)    Pertinent labs/radiology:                        9.0    9.40  )-----------( 278      ( 31 Jan 2025 00:30 )             28.7   01-31    130[L]  |  91[L]  |  28[H]  ----------------------------<  97  4.8   |  25  |  5.05[H]    Ca    8.9      31 Jan 2025 00:30  Phos  4.4     01-31  Mg     2.2     01-31    TPro  6.0  /  Alb  2.9[L]  /  TBili  0.5  /  DBili  x   /  AST  16  /  ALT  10  /  AlkPhos  123[H]  01-31

## 2025-01-31 NOTE — PROVIDER CONTACT NOTE (MEDICATION) - SITUATION
Theophyline ER Capsule 100mg due to be given at 2000. Medication is extended release and cannot be crushed for pt. with NPO status.

## 2025-01-31 NOTE — PROGRESS NOTE ADULT - SUBJECTIVE AND OBJECTIVE BOX
[facility]    Dr. Nica Styles     Office [phone] (9 am to 5 pm)  Service: [phone] (5pm to 9am)  Also Available on TEAMS      RENAL PROGRESS NOTE: DATE OF SERVICE 01-31-25 @ 11:55    Patient is a 54y old  Male who presents with a chief complaint of CAD s/p CABG (30 Jan 2025 06:01)      Patient seen and examined at bedside. No chest pain/sob    VITALS:  T(F): 98.6 (01-31-25 @ 08:00), Max: 99.1 (01-31-25 @ 00:00)  HR: 91 (01-31-25 @ 11:03)  BP: 124/67 (01-31-25 @ 11:00)  RR: 20 (01-31-25 @ 11:00)  SpO2: 100% (01-31-25 @ 11:03)  Wt(kg): --    01-30 @ 07:01 - 01-31 @ 07:00  --------------------------------------------------------  IN: 1657.3 mL / OUT: 0 mL / NET: 1657.3 mL    01-31 @ 07:01 - 01-31 @ 11:55  --------------------------------------------------------  IN: 260.4 mL / OUT: 0 mL / NET: 260.4 mL          PHYSICAL EXAM:  Constitutional: NAD  Neck: No JVD  Respiratory: CTAB, no wheezes, rales or rhonchi  Cardiovascular: S1, S2, RRR  Gastrointestinal: BS+, soft, NT/ND  Extremities: No peripheral edema    Hospital Medications:   MEDICATIONS  (STANDING):  acetylcysteine 10%  Inhalation 4 milliLiter(s) Inhalation every 6 hours  acyclovir IVPB 430 milliGRAM(s) IV Intermittent <User Schedule>  albuterol    0.083% 2.5 milliGRAM(s) Nebulizer every 6 hours  ascorbic acid 500 milliGRAM(s) Oral daily  aspirin  chewable 81 milliGRAM(s) Oral daily  atorvastatin 80 milliGRAM(s) Oral at bedtime  chlorhexidine 0.12% Liquid 15 milliLiter(s) Oral Mucosa every 12 hours  chlorhexidine 2% Cloths 1 Application(s) Topical daily  chlorhexidine 4% Liquid 1 Application(s) Topical <User Schedule>  dextrose 50% Injectable 50 milliLiter(s) IV Push every 15 minutes  DOBUTamine Infusion 2 MICROgram(s)/kG/Min (5.11 mL/Hr) IV Continuous <Continuous>  epoetin ignacia (EPOGEN) Injectable 39696 Unit(s) IV Push <User Schedule>  gabapentin 100 milliGRAM(s) Oral every 12 hours  heparin   Injectable 5000 Unit(s) SubCutaneous every 8 hours  insulin lispro (ADMELOG) corrective regimen sliding scale   SubCutaneous every 6 hours  lidocaine   4% Patch 1 Patch Transdermal daily  linezolid  IVPB      linezolid  IVPB 600 milliGRAM(s) IV Intermittent every 12 hours  melatonin 5 milliGRAM(s) Oral at bedtime  meropenem  IVPB 500 milliGRAM(s) IV Intermittent every 24 hours  methocarbamol 500 milliGRAM(s) Oral every 8 hours  mirtazapine 7.5 milliGRAM(s) Oral daily  Nephro-elicia 1 Tablet(s) Oral daily  pantoprazole  Injectable 40 milliGRAM(s) IV Push daily  polyethylene glycol 3350 17 Gram(s) Oral two times a day  sodium chloride 0.65% Nasal 1 Spray(s) Both Nostrils every 12 hours  sodium chloride 0.9%. 1000 milliLiter(s) (10 mL/Hr) IV Continuous <Continuous>  theophylline Syrup 50 milliGRAM(s) Oral every 6 hours  traZODone 50 milliGRAM(s) Oral at bedtime      LABS:  01-31    130[L]  |  91[L]  |  28[H]  ----------------------------<  97  4.8   |  25  |  5.05[H]    Ca    8.9      31 Jan 2025 00:30  Phos  4.4     01-31  Mg     2.2     01-31    TPro  6.0  /  Alb  2.9[L]  /  TBili  0.5  /  DBili      /  AST  16  /  ALT  10  /  AlkPhos  123[H]  01-31    Creatinine Trend: 5.05 <--, 3.99 <--, 5.51 <--, 4.57 <--, 6.22 <--, 5.22 <--, 4.32 <--    Albumin: 2.9 g/dL (01-31 @ 00:30)  Phosphorus: 4.4 mg/dL (01-31 @ 00:30)                              9.0    9.40  )-----------( 278      ( 31 Jan 2025 00:30 )             28.7     Urine Studies:  Urinalysis - [01-31-25 @ 00:30]      Color  / Appearance  / SG  / pH       Gluc 97 / Ketone   / Bili  / Urobili        Blood  / Protein  / Leuk Est  / Nitrite       RBC  / WBC  / Hyaline  / Gran  / Sq Epi  / Non Sq Epi  / Bacteria       Iron 29, TIBC 143, %sat 20      [12-19-24 @ 05:00]  Ferritin 904      [12-19-24 @ 05:00]  TSH 4.75      [12-24-24 @ 06:50]        RADIOLOGY & ADDITIONAL STUDIES:

## 2025-01-31 NOTE — PROGRESS NOTE ADULT - SUBJECTIVE AND OBJECTIVE BOX
MR#[id number]  PATIENT NAME:RAAD CANO    DATE OF SERVICE: 01-31-25 @ 0630  Patient was seen and examined by Solo Quick MD on    01-31-25 @ 0630  Interim events noted.Consultant notes ,Labs,Telemetry reviewed by me       HOSPITAL COURSE: HPI:  54M with PMH of HTN, HLD, ESRD on HD MWF via LUE AVF, ascites (requiring repeat paracenteses q4-6wks), NICM with moderate LV dysfunction w/ EF 35-40%(2023) and CAD s/p atherectomy + SALLIE to D1(4/11/2024) presents to [facility] transferred from [facility]. Patient initially presented to [facility] ED with shortness of breath. Of note he was recently admitted from 09/27-12/02 for acute cholecystitis s/p cholecystectomy. In [facility] ED, pt was hypoxic to 86% on RA, trop 1.7K, EKG no new changes, CXR fluid overload. Admitted for AHRF 2/2 fluid overload. Pt received HD on 12/18, 12/19, 12/20 and 12/22. DAPT was resumed, TTE showed improvement in LVEF to 40-45%. Stress test was abnormal with 1mm inferior STD, reversible ischemia in the inferior wall and fixed defects in the lateral, anterior and apical walls with post stress EF of 24%. Pt was then transferred to [facility] for cardiac cath which showed pLAD 70% ISR, mLCx 70%, D1 severe dz. Patient now transferred to [facility] CTS Dr. Rivers for CABG eval. Patient denies any current SOB or chest pain on admission. Otherwise denies headaches, abdominal pain, urinary or bowel changes, fevers, chills, N/V/D, sick contacts.  (24 Dec 2024 05:07)      INTERIM EVENTS:Patient seen at bedside ,interim events noted.      PMH -reviewed admission note, no change since admission  HEART FAILURE: Acute[ ]Chronic[ ] Systolic[ ] Diastolic[ ] Combined Systolic and Diastolic[ ]  CAD[ ] CABG[ ] PCI[ ]  DEVICES[ ] PPM[ ] ICD[ ] ILR[ ]  ATRIAL FIBRILLATION[ ] Paroxysmal[ ] Permanent[ ] CHADS2-[  ]  GHASSAN[ ] CKD1[ ] CKD2[ ] CKD3[ ] CKD4[ ] ESRD[ ]  COPD[ ] HTN[ ]   DM[ ] Type1[ ] Type 2[ ]   CVA[ ] Paresis[ ]    AMBULATION: Assisted[ ] Cane/walker[ ] Independent[ ]    MEDICATIONS  (STANDING):  acetylcysteine 10%  Inhalation 4 milliLiter(s) Inhalation every 6 hours  albuterol    0.083% 2.5 milliGRAM(s) Nebulizer every 6 hours  ascorbic acid 500 milliGRAM(s) Oral daily  aspirin  chewable 81 milliGRAM(s) Oral daily  atorvastatin 80 milliGRAM(s) Oral at bedtime  chlorhexidine 0.12% Liquid 15 milliLiter(s) Oral Mucosa every 12 hours  chlorhexidine 2% Cloths 1 Application(s) Topical daily  chlorhexidine 4% Liquid 1 Application(s) Topical <User Schedule>  dextrose 50% Injectable 50 milliLiter(s) IV Push every 15 minutes  DOBUTamine Infusion 1 MICROgram(s)/kG/Min (2.55 mL/Hr) IV Continuous <Continuous>  epoetin ignacia (EPOGEN) Injectable 13954 Unit(s) IV Push <User Schedule>  heparin   Injectable 5000 Unit(s) SubCutaneous every 8 hours  insulin lispro (ADMELOG) corrective regimen sliding scale   SubCutaneous every 6 hours  linezolid  IVPB      linezolid  IVPB 600 milliGRAM(s) IV Intermittent every 12 hours  melatonin 5 milliGRAM(s) Oral at bedtime  meropenem  IVPB 500 milliGRAM(s) IV Intermittent every 24 hours  methocarbamol 500 milliGRAM(s) Oral every 8 hours  mirtazapine 7.5 milliGRAM(s) Oral daily  Nephro-elciia 1 Tablet(s) Oral daily  pantoprazole  Injectable 40 milliGRAM(s) IV Push daily  polyethylene glycol 3350 17 Gram(s) Oral two times a day  pregabalin 50 milliGRAM(s) Oral every 8 hours  sodium chloride 0.65% Nasal 1 Spray(s) Both Nostrils every 12 hours  sodium chloride 0.9%. 1000 milliLiter(s) (10 mL/Hr) IV Continuous <Continuous>  theophylline Syrup 50 milliGRAM(s) Oral every 6 hours  traZODone 50 milliGRAM(s) Oral at bedtime    MEDICATIONS  (PRN):  acetaminophen     Tablet .. 650 milliGRAM(s) Oral every 6 hours PRN Mild Pain (1 - 3)  lidocaine/prilocaine Cream 1 Application(s) Topical daily PRN pre-HD  oxyCODONE    IR 5 milliGRAM(s) Oral every 4 hours PRN Moderate Pain (4 - 6)  oxyCODONE    IR 10 milliGRAM(s) Oral every 4 hours PRN Severe Pain (7 - 10)  sodium chloride 0.9% lock flush 10 milliLiter(s) IV Push every 1 hour PRN Pre/post blood products, medications, blood draw, and to maintain line patency            REVIEW OF SYSTEMS:  Constitutional: [ ] fever, [ ]weight loss,  [ ]fatigue [ ]weight gain  Eyes: [ ] visual changes  Respiratory: [ ]shortness of breath;  [ ] cough, [ ]wheezing, [ ]chills, [ ]hemoptysis  Cardiovascular: [ ] chest pain, [ ]palpitations, [ ]dizziness,  [ ]leg swelling[ ]orthopnea[ ]PND  Gastrointestinal: [ ] abdominal pain, [ ]nausea, [ ]vomiting,  [ ]diarrhea [ ]Constipation [ ]Melena  Genitourinary: [ ] dysuria, [ ] hematuria [ ]Vigil  Neurologic: [ ] headaches [ ] tremors[ ]weakness [ ]Paralysis Right[ ] Left[ ]  Skin: [ ] itching, [ ]burning, [ ] rashes  Endocrine: [ ] heat or cold intolerance  Musculoskeletal: [ ] joint pain or swelling; [ ] muscle, back, or extremity pain  Psychiatric: [ ] depression, [ ]anxiety, [ ]mood swings, or [ ]difficulty sleeping  Hematologic: [ ] easy bruising, [ ] bleeding gums    [ ] All remaining systems negative except as per above.   [ ]Unable to obtain.  [x] No change in ROS since admission      Vital Signs Last 24 Hrs  T(C): 36.8 (31 Jan 2025 20:00), Max: 37.3 (31 Jan 2025 00:00)  T(F): 98.3 (31 Jan 2025 20:00), Max: 99.1 (31 Jan 2025 00:00)  HR: 83 (31 Jan 2025 21:00) (65 - 99)  BP: 117/57 (31 Jan 2025 21:00) (117/57 - 181/84)  BP(mean): 82 (31 Jan 2025 21:00) (82 - 111)  RR: 25 (31 Jan 2025 21:00) (12 - 39)  SpO2: 99% (31 Jan 2025 21:00) (95% - 100%)    Parameters below as of 31 Jan 2025 20:00  Patient On (Oxygen Delivery Method): tracheostomy collar, high flow    O2 Concentration (%): 40  I&O's Summary    30 Jan 2025 07:01  -  31 Jan 2025 07:00  --------------------------------------------------------  IN: 1657.3 mL / OUT: 0 mL / NET: 1657.3 mL    31 Jan 2025 07:01  -  31 Jan 2025 21:56  --------------------------------------------------------  IN: 1394 mL / OUT: 1000 mL / NET: 394 mL        PHYSICAL EXAM:  General: No acute distress BMI-  HEENT: EOMI, PERRL  Neck: Supple, [ ] JVD  Lungs: Equal air entry bilaterally; [ ] rales [ ] wheezing [ ] rhonchi  Heart: Regular rate and rhythm; [x ] murmur   2/6 [ x] systolic [ ] diastolic [ ] radiation[ ] rubs [ ]  gallops  Abdomen: Nontender, bowel sounds present  Extremities: No clubbing, cyanosis, [ ] edema [ ]Pulses  equal and intact  Nervous system:  Alert & Oriented X3, no focal deficits  Psychiatric: Normal affect  Skin: No rashes or lesions    LABS:  01-31    130[L]  |  91[L]  |  28[H]  ----------------------------<  97  4.8   |  25  |  5.05[H]    Ca    8.9      31 Jan 2025 00:30  Phos  4.4     01-31  Mg     2.2     01-31    TPro  6.0  /  Alb  2.9[L]  /  TBili  0.5  /  DBili  x   /  AST  16  /  ALT  10  /  AlkPhos  123[H]  01-31    Creatinine Trend: 5.05<--, 3.99<--, 5.51<--, 4.57<--, 6.22<--, 5.22<--                        9.0    9.40  )-----------( 278      ( 31 Jan 2025 00:30 )             28.7       < from: TTE W or WO Ultrasound Enhancing Agent (01.27.25 @ 12:42) >     CONCLUSIONS:      1. This was a limited study to assess left ventricular systolic function.   2. Left ventricular systolic function is normal with an ejection fraction of 60 % by Michaels's method of disks.   3. Moderately enlarged right ventricular cavity size and mild to moderately reduced right ventricular systolic function.   4. Moderate pericardial effusion noted adjacent to the right atrium.   5. There is no definitive evidence of RA diastolic collapse in the available views. The pericardium proximal to the RV free wall was not well visualized. Due to limited views (as above, and no subcostal views), the study is indeterminate for the presence of echocardiographic signs of tamponade.   6. Left atrium is severely dilated.   7. Compared to the transthoracic echocardiogram performed on 1/19/2025, this was also a limited study. The area proximal to the right atrial free wall is better visualized on the present study and the echofree space most consistent with a pericardial effusion is more evident. There is again visual evidence of right ventricular dysfunction.    < end of copied text >

## 2025-01-31 NOTE — PROGRESS NOTE ADULT - ASSESSMENT
54M with PMH of HTN, HLD, ESRD on HD MWF via LUE AVF, ascites (requiring repeat paracenteses q4-6wks), NICM with moderate LV dysfunction w/ EF 35-40%(2023) and CAD s/p atherectomy + SALLIE to D1(4/11/2024) presents to [facility] transferred from [facility] for CABG eval  Nephro on [facility] for HD    ESRD on HD   Regular schedule MWF   Access LUE AVF  Center [facility]  Nephrologist Dr. Bailey  S/p HD on 01/29   HD today   Keep MAP>65  Renal Diet  Consent in physical chart    Hyper/Hypokalemia  Monitor K, will change dialysate fluid as needed    HTN  currently on pressure support  monitor bp     CKD MBD  Can send a PTH  Ca and Phos daily    Anemia  CRISTIANE with HD  Transfuse if hgb <7    CAD with multivessel disease  s/p CABG 12/30  CTICU following    Hypoxic Resp failure requiring Trach  Per surgery  S/p bronchoscopy, pulm following

## 2025-01-31 NOTE — PROGRESS NOTE ADULT - SUBJECTIVE AND OBJECTIVE BOX
Patient is a 54y old  Male who presents with a chief complaint of CAD s/p CABG (30 Jan 2025 06:01)    Being followed by ID for        Interval history:  No other acute events      ROS:  No cough,SOB,CP  No N/V/D  No abd pain  No urinary complaints  No HA  No joint or limb pain  No other complaints    PAST MEDICAL & SURGICAL HISTORY:  Type 2 diabetes mellitus      Hypertension      End stage renal disease  started HD 2/2019 T, Th, Sat via right chest permacath      Bone spur  right shoulder- hx of - sx done      Anemia      Injury of right wrist  hx of at age 14      History of hyperkalemia  before HD- K-6.2 - to repeat in am of sx, pt had HD today      S/P arthroscopy of right shoulder  2010      History of vascular access device  right chest permacath - 2/2019      H/O right wrist surgery  tendons repair - at age 14      AV fistula      H/O ventral hernia repair      S/P cholecystectomy        Allergies    No Known Allergies    Intolerances      Antimicrobials:    acyclovir IVPB 430 milliGRAM(s) IV Intermittent <User Schedule>  linezolid  IVPB      linezolid  IVPB 600 milliGRAM(s) IV Intermittent every 12 hours  meropenem  IVPB 500 milliGRAM(s) IV Intermittent every 24 hours    MEDICATIONS  (STANDING):  acetylcysteine 10%  Inhalation 4 milliLiter(s) Inhalation every 6 hours  acyclovir IVPB 430 milliGRAM(s) IV Intermittent <User Schedule>  albuterol    0.083% 2.5 milliGRAM(s) Nebulizer every 6 hours  ascorbic acid 500 milliGRAM(s) Oral daily  aspirin  chewable 81 milliGRAM(s) Oral daily  atorvastatin 80 milliGRAM(s) Oral at bedtime  chlorhexidine 0.12% Liquid 15 milliLiter(s) Oral Mucosa every 12 hours  chlorhexidine 2% Cloths 1 Application(s) Topical daily  chlorhexidine 4% Liquid 1 Application(s) Topical <User Schedule>  dextrose 50% Injectable 50 milliLiter(s) IV Push every 15 minutes  DOBUTamine Infusion 2 MICROgram(s)/kG/Min (5.11 mL/Hr) IV Continuous <Continuous>  epoetin ignacia (EPOGEN) Injectable 90869 Unit(s) IV Push <User Schedule>  gabapentin 100 milliGRAM(s) Oral every 12 hours  heparin   Injectable 5000 Unit(s) SubCutaneous every 8 hours  insulin lispro (ADMELOG) corrective regimen sliding scale   SubCutaneous every 6 hours  lidocaine   4% Patch 1 Patch Transdermal daily  linezolid  IVPB      linezolid  IVPB 600 milliGRAM(s) IV Intermittent every 12 hours  melatonin 5 milliGRAM(s) Oral at bedtime  meropenem  IVPB 500 milliGRAM(s) IV Intermittent every 24 hours  methocarbamol 500 milliGRAM(s) Oral every 8 hours  mirtazapine 7.5 milliGRAM(s) Oral daily  Nephro-elicia 1 Tablet(s) Oral daily  pantoprazole  Injectable 40 milliGRAM(s) IV Push daily  polyethylene glycol 3350 17 Gram(s) Oral two times a day  sodium chloride 0.65% Nasal 1 Spray(s) Both Nostrils every 12 hours  sodium chloride 0.9%. 1000 milliLiter(s) (10 mL/Hr) IV Continuous <Continuous>  theophylline Syrup 50 milliGRAM(s) Oral every 6 hours  traZODone 50 milliGRAM(s) Oral at bedtime      Vital Signs Last 24 Hrs  T(C): 37 (01-31-25 @ 08:00), Max: 37.3 (01-31-25 @ 00:00)  T(F): 98.6 (01-31-25 @ 08:00), Max: 99.1 (01-31-25 @ 00:00)  HR: 84 (01-31-25 @ 12:00) (64 - 99)  BP: 164/74 (01-31-25 @ 12:00) (120/60 - 181/81)  BP(mean): 107 (01-31-25 @ 12:00) (86 - 116)  RR: 21 (01-31-25 @ 12:00) (9 - 23)  SpO2: 98% (01-31-25 @ 12:00) (95% - 100%)    Physical Exam:    Constitutional well preserved,comfortable,pleasant    HEENT PERRLA EOMI,No pallor or icterus    No oral exudate or erythema    Neck supple no JVD or LN    Chest Good AE,CTA    CVS RRR S1 S2 WNl No murmur or rub or gallop    Abd soft BS normal No tenderness no masses    Ext No cyanosis clubbing or edema    IV site no erythema tenderness or discharge    Joints no swelling or LOM    CNS AAO X 3 no focal    Lab Data:                          9.0    9.40  )-----------( 278      ( 31 Jan 2025 00:30 )             28.7       01-31    130[L]  |  91[L]  |  28[H]  ----------------------------<  97  4.8   |  25  |  5.05[H]    Ca    8.9      31 Jan 2025 00:30  Phos  4.4     01-31  Mg     2.2     01-31    TPro  6.0  /  Alb  2.9[L]  /  TBili  0.5  /  DBili  x   /  AST  16  /  ALT  10  /  AlkPhos  123[H]  01-31            Bronchial  01-30-25 --  --    Few polymorphonuclear leukocytes seen per low power field  Few Squamous epithelial cells seen per low power field  Few Yeast like cells seen per oil power field      Bronchial  01-28-25   Commensal manjit consistent with body site  --    Moderate polymorphonuclear leukocytes per low power field  Numerous Squamous epithelial cells per low power field  Few Yeast like cells per oil power field      .Blood BLOOD  01-27-25   No growth at 72 Hours  --  --      Tissue  01-26-25   Rare Serratia marcescens  --  Serratia marcescens      Bronchial  01-24-25   Culture is being performed.  --  Enterococcus faecium (vancomycin resistant)        WBC Count: 9.40 (01-31-25 @ 00:30)  WBC Count: 8.96 (01-30-25 @ 00:24)  WBC Count: 7.37 (01-29-25 @ 00:58)  WBC Count: 8.40 (01-28-25 @ 00:24)  WBC Count: 10.28 (01-27-25 @ 00:54)  WBC Count: 11.39 (01-26-25 @ 00:27)  WBC Count: 9.97 (01-25-25 @ 00:32)       Bilirubin Total: 0.5 mg/dL (01-31-25 @ 00:30)  Aspartate Aminotransferase (AST/SGOT): 16 U/L (01-31-25 @ 00:30)  Alanine Aminotransferase (ALT/SGPT): 10 U/L (01-31-25 @ 00:30)  Alkaline Phosphatase: 123 U/L (01-31-25 @ 00:30)    Bilirubin Total: 0.5 mg/dL (01-30-25 @ 00:24)  Aspartate Aminotransferase (AST/SGOT): 21 U/L (01-30-25 @ 00:24)  Alanine Aminotransferase (ALT/SGPT): 14 U/L (01-30-25 @ 00:24)  Alkaline Phosphatase: 129 U/L (01-30-25 @ 00:24)    Bilirubin Total: 0.4 mg/dL (01-29-25 @ 00:58)  Aspartate Aminotransferase (AST/SGOT): 20 U/L (01-29-25 @ 00:58)  Alanine Aminotransferase (ALT/SGPT): 11 U/L (01-29-25 @ 00:58)  Alkaline Phosphatase: 118 U/L (01-29-25 @ 00:58)           Patient is a 54y old  Male who presents with a chief complaint of CAD s/p CABG (30 Jan 2025 06:01)    Being followed by ID for        Interval history:  right arm much better  buttocks less tender  some bloody secretions from trach  No other acute events        PAST MEDICAL & SURGICAL HISTORY:  Type 2 diabetes mellitus      Hypertension      End stage renal disease  started HD 2/2019 T, Th, Sat via right chest permacath      Bone spur  right shoulder- hx of - sx done      Anemia      Injury of right wrist  hx of at age 14      History of hyperkalemia  before HD- K-6.2 - to repeat in am of sx, pt had HD today      S/P arthroscopy of right shoulder  2010      History of vascular access device  right chest permacath - 2/2019      H/O right wrist surgery  tendons repair - at age 14      AV fistula      H/O ventral hernia repair      S/P cholecystectomy        Allergies    No Known Allergies    Intolerances      Antimicrobials:    acyclovir IVPB 430 milliGRAM(s) IV Intermittent <User Schedule>  linezolid  IVPB      linezolid  IVPB 600 milliGRAM(s) IV Intermittent every 12 hours  meropenem  IVPB 500 milliGRAM(s) IV Intermittent every 24 hours    MEDICATIONS  (STANDING):  acetylcysteine 10%  Inhalation 4 milliLiter(s) Inhalation every 6 hours  acyclovir IVPB 430 milliGRAM(s) IV Intermittent <User Schedule>  albuterol    0.083% 2.5 milliGRAM(s) Nebulizer every 6 hours  ascorbic acid 500 milliGRAM(s) Oral daily  aspirin  chewable 81 milliGRAM(s) Oral daily  atorvastatin 80 milliGRAM(s) Oral at bedtime  chlorhexidine 0.12% Liquid 15 milliLiter(s) Oral Mucosa every 12 hours  chlorhexidine 2% Cloths 1 Application(s) Topical daily  chlorhexidine 4% Liquid 1 Application(s) Topical <User Schedule>  dextrose 50% Injectable 50 milliLiter(s) IV Push every 15 minutes  DOBUTamine Infusion 2 MICROgram(s)/kG/Min (5.11 mL/Hr) IV Continuous <Continuous>  epoetin ignacia (EPOGEN) Injectable 58496 Unit(s) IV Push <User Schedule>  gabapentin 100 milliGRAM(s) Oral every 12 hours  heparin   Injectable 5000 Unit(s) SubCutaneous every 8 hours  insulin lispro (ADMELOG) corrective regimen sliding scale   SubCutaneous every 6 hours  lidocaine   4% Patch 1 Patch Transdermal daily  linezolid  IVPB      linezolid  IVPB 600 milliGRAM(s) IV Intermittent every 12 hours  melatonin 5 milliGRAM(s) Oral at bedtime  meropenem  IVPB 500 milliGRAM(s) IV Intermittent every 24 hours  methocarbamol 500 milliGRAM(s) Oral every 8 hours  mirtazapine 7.5 milliGRAM(s) Oral daily  Nephro-elicia 1 Tablet(s) Oral daily  pantoprazole  Injectable 40 milliGRAM(s) IV Push daily  polyethylene glycol 3350 17 Gram(s) Oral two times a day  sodium chloride 0.65% Nasal 1 Spray(s) Both Nostrils every 12 hours  sodium chloride 0.9%. 1000 milliLiter(s) (10 mL/Hr) IV Continuous <Continuous>  theophylline Syrup 50 milliGRAM(s) Oral every 6 hours  traZODone 50 milliGRAM(s) Oral at bedtime      Vital Signs Last 24 Hrs  T(C): 37 (01-31-25 @ 08:00), Max: 37.3 (01-31-25 @ 00:00)  T(F): 98.6 (01-31-25 @ 08:00), Max: 99.1 (01-31-25 @ 00:00)  HR: 84 (01-31-25 @ 12:00) (64 - 99)  BP: 164/74 (01-31-25 @ 12:00) (120/60 - 181/81)  BP(mean): 107 (01-31-25 @ 12:00) (86 - 116)  RR: 21 (01-31-25 @ 12:00) (9 - 23)  SpO2: 98% (01-31-25 @ 12:00) (95% - 100%)    Physical Exam:    Constitutional  resting quietly  HEENT PERRLA EOMI,No pallor or icterus    No oral exudate or erythema    Neck trach    Chest Good AE,CTA    CVS  S1 S2     Abd soft BS normal No tenderness     Ext No cyanosis clubbing or edema    back lesion dressed     sacral decubitus with central eschar  no longer exquisitely tender    IV site no erythema tenderness or discharge    Joints no swelling or LOM    CNS AAO X 3 no focal    Lab Data:                          9.0    9.40  )-----------( 278      ( 31 Jan 2025 00:30 )             28.7       01-31    130[L]  |  91[L]  |  28[H]  ----------------------------<  97  4.8   |  25  |  5.05[H]    Ca    8.9      31 Jan 2025 00:30  Phos  4.4     01-31  Mg     2.2     01-31    TPro  6.0  /  Alb  2.9[L]  /  TBili  0.5  /  DBili  x   /  AST  16  /  ALT  10  /  AlkPhos  123[H]  01-31            Bronchial  01-30-25 --  --    Few polymorphonuclear leukocytes seen per low power field  Few Squamous epithelial cells seen per low power field  Few Yeast like cells seen per oil power field      Bronchial  01-28-25   Commensal manjit consistent with body site  --    Moderate polymorphonuclear leukocytes per low power field  Numerous Squamous epithelial cells per low power field  Few Yeast like cells per oil power field      .Blood BLOOD  01-27-25   No growth at 72 Hours  --  --      Tissue  01-26-25   Rare Serratia marcescens  --  Serratia marcescens      Bronchial  01-24-25   Culture is being performed.  --  Enterococcus faecium (vancomycin resistant)    Culture - Blood (01.22.25 @ 12:20)    -  Gentamicin synergy: S <=500   -  Streptomycin synergy: S <=1000   -  Enterococcus faecium,VRE: Detec   -  Ampicillin: R >8 Predicts results to ampicillin/sulbactam, amoxacillin-clavulanate and  piperacillin-tazobactam.   Gram Stain:   Growth in anaerobic bottle: Gram Positive Cocci in Pairs and Chains   -  Vancomycin: R >16   -  Linezolid: S 2   -  Daptomycin: R >4   Specimen Source: .Blood BLOOD   Organism: Blood Culture PCR   Organism: Enterococcus faecium (vancomycin resistant)   Culture Results:   Growth in anaerobic bottle: Enterococcus faecium (vancomycin resistant)  Direct identification is available within approximately 3-5  hours either by Blood Panel Multiplexed PCR or Direct  MALDI-TOF. Details: [email or web address]   Organism Identification: Blood Culture PCR  Enterococcus faecium (vancomycin resistant)   Method Type: LISA   Method Type: PCR        WBC Count: 9.40 (01-31-25 @ 00:30)  WBC Count: 8.96 (01-30-25 @ 00:24)  WBC Count: 7.37 (01-29-25 @ 00:58)  WBC Count: 8.40 (01-28-25 @ 00:24)  WBC Count: 10.28 (01-27-25 @ 00:54)  WBC Count: 11.39 (01-26-25 @ 00:27)  WBC Count: 9.97 (01-25-25 @ 00:32)       Bilirubin Total: 0.5 mg/dL (01-31-25 @ 00:30)  Aspartate Aminotransferase (AST/SGOT): 16 U/L (01-31-25 @ 00:30)  Alanine Aminotransferase (ALT/SGPT): 10 U/L (01-31-25 @ 00:30)  Alkaline Phosphatase: 123 U/L (01-31-25 @ 00:30)    Bilirubin Total: 0.5 mg/dL (01-30-25 @ 00:24)  Aspartate Aminotransferase (AST/SGOT): 21 U/L (01-30-25 @ 00:24)  Alanine Aminotransferase (ALT/SGPT): 14 U/L (01-30-25 @ 00:24)  Alkaline Phosphatase: 129 U/L (01-30-25 @ 00:24)    Bilirubin Total: 0.4 mg/dL (01-29-25 @ 00:58)  Aspartate Aminotransferase (AST/SGOT): 20 U/L (01-29-25 @ 00:58)  Alanine Aminotransferase (ALT/SGPT): 11 U/L (01-29-25 @ 00:58)  Alkaline Phosphatase: 118 U/L (01-29-25 @ 00:58)

## 2025-02-01 LAB
ALBUMIN SERPL ELPH-MCNC: 2.9 G/DL — LOW (ref 3.3–5)
ALP SERPL-CCNC: 121 U/L — HIGH (ref 40–120)
ALT FLD-CCNC: 11 U/L — SIGNIFICANT CHANGE UP (ref 10–45)
ANION GAP SERPL CALC-SCNC: 13 MMOL/L — SIGNIFICANT CHANGE UP (ref 5–17)
ANISOCYTOSIS BLD QL: SIGNIFICANT CHANGE UP
AST SERPL-CCNC: 14 U/L — SIGNIFICANT CHANGE UP (ref 10–40)
BASOPHILS # BLD AUTO: 0 K/UL — SIGNIFICANT CHANGE UP (ref 0–0.2)
BASOPHILS NFR BLD AUTO: 0 % — SIGNIFICANT CHANGE UP (ref 0–2)
BILIRUB SERPL-MCNC: 0.5 MG/DL — SIGNIFICANT CHANGE UP (ref 0.2–1.2)
BUN SERPL-MCNC: 20 MG/DL — SIGNIFICANT CHANGE UP (ref 7–23)
CALCIUM SERPL-MCNC: 8.6 MG/DL — SIGNIFICANT CHANGE UP (ref 8.4–10.5)
CHLORIDE SERPL-SCNC: 93 MMOL/L — LOW (ref 96–108)
CO2 SERPL-SCNC: 26 MMOL/L — SIGNIFICANT CHANGE UP (ref 22–31)
CREAT SERPL-MCNC: 3.78 MG/DL — HIGH (ref 0.5–1.3)
CULTURE RESULTS: ABNORMAL
CULTURE RESULTS: SIGNIFICANT CHANGE UP
CULTURE RESULTS: SIGNIFICANT CHANGE UP
EGFR: 18 ML/MIN/1.73M2 — LOW
EGFR: 18 ML/MIN/1.73M2 — LOW
EOSINOPHIL # BLD AUTO: 0.19 K/UL — SIGNIFICANT CHANGE UP (ref 0–0.5)
EOSINOPHIL NFR BLD AUTO: 1.7 % — SIGNIFICANT CHANGE UP (ref 0–6)
GLUCOSE SERPL-MCNC: 114 MG/DL — HIGH (ref 70–99)
HCT VFR BLD CALC: 27.6 % — LOW (ref 39–50)
LYMPHOCYTES # BLD AUTO: 0.29 K/UL — LOW (ref 1–3.3)
LYMPHOCYTES # BLD AUTO: 2.6 % — LOW (ref 13–44)
MACROCYTES BLD QL: SLIGHT — SIGNIFICANT CHANGE UP
MAGNESIUM SERPL-MCNC: 2 MG/DL — SIGNIFICANT CHANGE UP (ref 1.6–2.6)
MANUAL SMEAR VERIFICATION: SIGNIFICANT CHANGE UP
MCHC RBC-ENTMCNC: 29.7 PG — SIGNIFICANT CHANGE UP (ref 27–34)
MCHC RBC-ENTMCNC: 31.5 G/DL — LOW (ref 32–36)
MCV RBC AUTO: 94.2 FL — SIGNIFICANT CHANGE UP (ref 80–100)
MONOCYTES # BLD AUTO: 0.48 K/UL — SIGNIFICANT CHANGE UP (ref 0–0.9)
NEUTROPHILS # BLD AUTO: 10.06 K/UL — HIGH (ref 1.8–7.4)
NEUTROPHILS NFR BLD AUTO: 91.3 % — HIGH (ref 43–77)
PHOSPHATE SERPL-MCNC: 3.1 MG/DL — SIGNIFICANT CHANGE UP (ref 2.5–4.5)
PLAT MORPH BLD: NORMAL — SIGNIFICANT CHANGE UP
PLATELET # BLD AUTO: 268 K/UL — SIGNIFICANT CHANGE UP (ref 150–400)
POIKILOCYTOSIS BLD QL AUTO: SLIGHT — SIGNIFICANT CHANGE UP
POTASSIUM SERPL-MCNC: 4.3 MMOL/L — SIGNIFICANT CHANGE UP (ref 3.5–5.3)
POTASSIUM SERPL-SCNC: 4.3 MMOL/L — SIGNIFICANT CHANGE UP (ref 3.5–5.3)
PROT SERPL-MCNC: 6 G/DL — SIGNIFICANT CHANGE UP (ref 6–8.3)
RBC # BLD: 2.93 M/UL — LOW (ref 4.2–5.8)
RBC # FLD: 19.7 % — HIGH (ref 10.3–14.5)
SODIUM SERPL-SCNC: 132 MMOL/L — LOW (ref 135–145)
SPECIMEN SOURCE: SIGNIFICANT CHANGE UP
WBC # BLD: 11.02 K/UL — HIGH (ref 3.8–10.5)
WBC # FLD AUTO: 11.02 K/UL — HIGH (ref 3.8–10.5)

## 2025-02-01 PROCEDURE — 31646 BRNCHSC W/THER ASPIR SBSQ: CPT

## 2025-02-01 PROCEDURE — 31645 BRNCHSC W/THER ASPIR 1ST: CPT

## 2025-02-01 PROCEDURE — 11042 DBRDMT SUBQ TIS 1ST 20SQCM/<: CPT | Mod: 79,GC

## 2025-02-01 PROCEDURE — 11045 DBRDMT SUBQ TISS EACH ADDL: CPT | Mod: 79,GC

## 2025-02-01 PROCEDURE — 71045 X-RAY EXAM CHEST 1 VIEW: CPT | Mod: 26,77

## 2025-02-01 PROCEDURE — 99232 SBSQ HOSP IP/OBS MODERATE 35: CPT

## 2025-02-01 PROCEDURE — 99232 SBSQ HOSP IP/OBS MODERATE 35: CPT | Mod: 24,25

## 2025-02-01 PROCEDURE — 99291 CRITICAL CARE FIRST HOUR: CPT | Mod: FS,25

## 2025-02-01 PROCEDURE — 71045 X-RAY EXAM CHEST 1 VIEW: CPT | Mod: 26

## 2025-02-01 PROCEDURE — G0545: CPT

## 2025-02-01 PROCEDURE — 10060 I&D ABSCESS SIMPLE/SINGLE: CPT | Mod: 79,GC

## 2025-02-01 RX ORDER — FENTANYL CITRATE-0.9 % NACL/PF 100MCG/2ML
50 SYRINGE (ML) INTRAVENOUS ONCE
Refills: 0 | Status: DISCONTINUED | OUTPATIENT
Start: 2025-02-01 | End: 2025-02-01

## 2025-02-01 RX ORDER — LIDOCAINE HCL/PF 10 MG/ML
10 VIAL (ML) INJECTION ONCE
Refills: 0 | Status: COMPLETED | OUTPATIENT
Start: 2025-02-01 | End: 2025-02-01

## 2025-02-01 RX ORDER — PROPOFOL 10 MG/ML
20 INJECTION, EMULSION INTRAVENOUS ONCE
Refills: 0 | Status: COMPLETED | OUTPATIENT
Start: 2025-02-01 | End: 2025-02-01

## 2025-02-01 RX ADMIN — Medication 1 SPRAY(S): at 05:08

## 2025-02-01 RX ADMIN — ATORVASTATIN CALCIUM 80 MILLIGRAM(S): 80 TABLET, FILM COATED ORAL at 21:00

## 2025-02-01 RX ADMIN — ACETYLCYSTEINE 4 MILLILITER(S): 200 INHALANT RESPIRATORY (INHALATION) at 23:07

## 2025-02-01 RX ADMIN — Medication 10 MILLILITER(S): at 14:22

## 2025-02-01 RX ADMIN — PREGABALIN 50 MILLIGRAM(S): 50 CAPSULE ORAL at 21:00

## 2025-02-01 RX ADMIN — METHOCARBAMOL 500 MILLIGRAM(S): 500 TABLET, FILM COATED ORAL at 21:00

## 2025-02-01 RX ADMIN — Medication 650 MILLIGRAM(S): at 06:00

## 2025-02-01 RX ADMIN — PREGABALIN 50 MILLIGRAM(S): 50 CAPSULE ORAL at 05:08

## 2025-02-01 RX ADMIN — LINEZOLID 300 MILLIGRAM(S): 2 INJECTION, SOLUTION INTRAVENOUS at 00:12

## 2025-02-01 RX ADMIN — HEPARIN SODIUM 5000 UNIT(S): 1000 INJECTION INTRAVENOUS; SUBCUTANEOUS at 14:27

## 2025-02-01 RX ADMIN — Medication 50 MICROGRAM(S): at 17:00

## 2025-02-01 RX ADMIN — Medication 40 MILLIGRAM(S): at 12:32

## 2025-02-01 RX ADMIN — METHOCARBAMOL 500 MILLIGRAM(S): 500 TABLET, FILM COATED ORAL at 14:27

## 2025-02-01 RX ADMIN — Medication 50 MICROGRAM(S): at 17:15

## 2025-02-01 RX ADMIN — Medication 650 MILLIGRAM(S): at 15:34

## 2025-02-01 RX ADMIN — INSULIN LISPRO 2: 100 INJECTION, SOLUTION INTRAVENOUS; SUBCUTANEOUS at 05:20

## 2025-02-01 RX ADMIN — HEPARIN SODIUM 5000 UNIT(S): 1000 INJECTION INTRAVENOUS; SUBCUTANEOUS at 21:00

## 2025-02-01 RX ADMIN — OXYCODONE HYDROCHLORIDE 5 MILLIGRAM(S): 30 TABLET ORAL at 07:00

## 2025-02-01 RX ADMIN — Medication 2.5 MILLIGRAM(S): at 23:07

## 2025-02-01 RX ADMIN — OXYCODONE HYDROCHLORIDE 5 MILLIGRAM(S): 30 TABLET ORAL at 11:11

## 2025-02-01 RX ADMIN — ACETYLCYSTEINE 4 MILLILITER(S): 200 INHALANT RESPIRATORY (INHALATION) at 05:19

## 2025-02-01 RX ADMIN — Medication 15 MILLILITER(S): at 05:07

## 2025-02-01 RX ADMIN — Medication 81 MILLIGRAM(S): at 12:32

## 2025-02-01 RX ADMIN — OXYCODONE HYDROCHLORIDE 5 MILLIGRAM(S): 30 TABLET ORAL at 10:41

## 2025-02-01 RX ADMIN — POLYETHYLENE GLYCOL 3350 17 GRAM(S): 17 POWDER, FOR SOLUTION ORAL at 17:29

## 2025-02-01 RX ADMIN — Medication 500 MILLIGRAM(S): at 12:32

## 2025-02-01 RX ADMIN — ACETYLCYSTEINE 4 MILLILITER(S): 200 INHALANT RESPIRATORY (INHALATION) at 17:24

## 2025-02-01 RX ADMIN — Medication 5 MILLIGRAM(S): at 21:00

## 2025-02-01 RX ADMIN — Medication 15 MILLILITER(S): at 17:29

## 2025-02-01 RX ADMIN — METHOCARBAMOL 500 MILLIGRAM(S): 500 TABLET, FILM COATED ORAL at 05:07

## 2025-02-01 RX ADMIN — Medication 1 TABLET(S): at 12:32

## 2025-02-01 RX ADMIN — Medication 650 MILLIGRAM(S): at 15:04

## 2025-02-01 RX ADMIN — Medication 2.5 MILLIGRAM(S): at 05:18

## 2025-02-01 RX ADMIN — Medication 50 MILLIGRAM(S): at 21:00

## 2025-02-01 RX ADMIN — Medication 50 MILLIGRAM(S): at 17:53

## 2025-02-01 RX ADMIN — PROPOFOL 20 MILLIGRAM(S): 10 INJECTION, EMULSION INTRAVENOUS at 12:31

## 2025-02-01 RX ADMIN — MEROPENEM 100 MILLIGRAM(S): 1 INJECTION INTRAVENOUS at 05:53

## 2025-02-01 RX ADMIN — Medication 1 APPLICATION(S): at 05:08

## 2025-02-01 RX ADMIN — LINEZOLID 300 MILLIGRAM(S): 2 INJECTION, SOLUTION INTRAVENOUS at 12:32

## 2025-02-01 RX ADMIN — OXYCODONE HYDROCHLORIDE 5 MILLIGRAM(S): 30 TABLET ORAL at 06:30

## 2025-02-01 RX ADMIN — HEPARIN SODIUM 5000 UNIT(S): 1000 INJECTION INTRAVENOUS; SUBCUTANEOUS at 05:07

## 2025-02-01 RX ADMIN — MIRTAZAPINE 7.5 MILLIGRAM(S): 30 TABLET, FILM COATED ORAL at 12:32

## 2025-02-01 RX ADMIN — Medication 2.5 MILLIGRAM(S): at 17:25

## 2025-02-01 RX ADMIN — ACETYLCYSTEINE 4 MILLILITER(S): 200 INHALANT RESPIRATORY (INHALATION) at 11:48

## 2025-02-01 RX ADMIN — PREGABALIN 50 MILLIGRAM(S): 50 CAPSULE ORAL at 14:27

## 2025-02-01 RX ADMIN — Medication 2.5 MILLIGRAM(S): at 11:48

## 2025-02-01 RX ADMIN — Medication 1 APPLICATION(S): at 22:12

## 2025-02-01 RX ADMIN — PROPOFOL 20 MILLIGRAM(S): 10 INJECTION, EMULSION INTRAVENOUS at 14:22

## 2025-02-01 RX ADMIN — Medication 1 SPRAY(S): at 17:29

## 2025-02-01 NOTE — PROCEDURE NOTE - NSBRONCHPROCDETAILS_GEN_A_CORE_FT
Bronchoscope inserted through tracheostomy to level of ema.  Airway inspected to subsegmental level.  Secretions were therapeutically aspirated.  The bronchoscope was subsequently withdrawn.

## 2025-02-01 NOTE — PROCEDURE NOTE - NSBRONCHPREOPMEDS_GEN_ALL_CORE_FT
Ketamine IVP
Diprivan 50 mcg/kg/min
propofol drip, fentanyl, precedex
Ketamine, Versed, Precedex, Lidocaine
Lidocaine 2% 2 cc endotracheal, Propofol IVP

## 2025-02-01 NOTE — PROGRESS NOTE ADULT - SUBJECTIVE AND OBJECTIVE BOX
Patient is a 54y old  Male who presents with a chief complaint of CAD s/p CABG (31 Jan 2025 15:42)    Being followed by ID for        Interval history:  pt c/o buttocks pain  pt seems uncomfortable  back lesion still with discharge   secretions allegedly still blood tinged overnight   No other acute events      PAST MEDICAL & SURGICAL HISTORY:  Type 2 diabetes mellitus      Hypertension      End stage renal disease  started HD 2/2019 T, Th, Sat via right chest permacath      Bone spur  right shoulder- hx of - sx done      Anemia      Injury of right wrist  hx of at age 14      History of hyperkalemia  before HD- K-6.2 - to repeat in am of sx, pt had HD today      S/P arthroscopy of right shoulder  2010      History of vascular access device  right chest permacath - 2/2019      H/O right wrist surgery  tendons repair - at age 14      AV fistula      H/O ventral hernia repair      S/P cholecystectomy        Allergies    No Known Allergies    Intolerances      Antimicrobials:    linezolid  IVPB      linezolid  IVPB 600 milliGRAM(s) IV Intermittent every 12 hours  meropenem  IVPB 500 milliGRAM(s) IV Intermittent every 24 hours    MEDICATIONS  (STANDING):  acetylcysteine 10%  Inhalation 4 milliLiter(s) Inhalation every 6 hours  albuterol    0.083% 2.5 milliGRAM(s) Nebulizer every 6 hours  ascorbic acid 500 milliGRAM(s) Oral daily  aspirin  chewable 81 milliGRAM(s) Oral daily  atorvastatin 80 milliGRAM(s) Oral at bedtime  chlorhexidine 0.12% Liquid 15 milliLiter(s) Oral Mucosa every 12 hours  chlorhexidine 2% Cloths 1 Application(s) Topical daily  chlorhexidine 4% Liquid 1 Application(s) Topical <User Schedule>  dextrose 50% Injectable 50 milliLiter(s) IV Push every 15 minutes  epoetin ignacia (EPOGEN) Injectable 41870 Unit(s) IV Push <User Schedule>  heparin   Injectable 5000 Unit(s) SubCutaneous every 8 hours  insulin lispro (ADMELOG) corrective regimen sliding scale   SubCutaneous every 6 hours  linezolid  IVPB      linezolid  IVPB 600 milliGRAM(s) IV Intermittent every 12 hours  melatonin 5 milliGRAM(s) Oral at bedtime  meropenem  IVPB 500 milliGRAM(s) IV Intermittent every 24 hours  methocarbamol 500 milliGRAM(s) Oral every 8 hours  mirtazapine 7.5 milliGRAM(s) Oral daily  Nephro-elicia 1 Tablet(s) Oral daily  pantoprazole  Injectable 40 milliGRAM(s) IV Push daily  polyethylene glycol 3350 17 Gram(s) Oral two times a day  pregabalin 50 milliGRAM(s) Oral every 8 hours  sodium chloride 0.65% Nasal 1 Spray(s) Both Nostrils every 12 hours  sodium chloride 0.9%. 1000 milliLiter(s) (10 mL/Hr) IV Continuous <Continuous>  theophylline Syrup 50 milliGRAM(s) Oral every 6 hours  traZODone 50 milliGRAM(s) Oral at bedtime      Vital Signs Last 24 Hrs  T(C): 37.2 (02-01-25 @ 04:00), Max: 37.2 (02-01-25 @ 04:00)  T(F): 99 (02-01-25 @ 04:00), Max: 99 (02-01-25 @ 04:00)  HR: 102 (02-01-25 @ 09:00) (65 - 111)  BP: 115/64 (02-01-25 @ 09:00) (95/54 - 181/84)  BP(mean): 83 (02-01-25 @ 09:00) (69 - 110)  RR: 25 (02-01-25 @ 09:00) (12 - 39)  SpO2: 98% (02-01-25 @ 09:00) (94% - 100%)    Physical Exam:    Constitutional well preserved,comfortable,pleasant    HEENT PERRLA EOMI,No pallor or icterus    No oral exudate or erythema    Neck supple no JVD or LN    Chest Good AE,CTA    CVS RRR S1 S2 WNl No murmur or rub or gallop    Abd soft BS normal No tenderness no masses    Ext No cyanosis clubbing or edema    IV site no erythema tenderness or discharge    Joints no swelling or LOM    CNS AAO X 3 no focal    Lab Data:                          8.7    11.02 )-----------( 268      ( 01 Feb 2025 00:42 )             27.6       02-01    132[L]  |  93[L]  |  20  ----------------------------<  114[H]  4.3   |  26  |  3.78[H]    Ca    8.6      01 Feb 2025 00:42  Phos  3.1     02-01  Mg     2.0     02-01    TPro  6.0  /  Alb  2.9[L]  /  TBili  0.5  /  DBili  x   /  AST  14  /  ALT  11  /  AlkPhos  121[H]  02-01          Bronchial  01-30-25   Commensal manjit consistent with body site  --    Few polymorphonuclear leukocytes seen per low power field  Few Squamous epithelial cells seen per low power field  Few Yeast like cells seen per oil power field      Bronchial  01-28-25   Commensal manjit consistent with body site  --    Moderate polymorphonuclear leukocytes per low power field  Numerous Squamous epithelial cells per low power field  Few Yeast like cells per oil power field      .Blood BLOOD  01-27-25   No growth at 4 days  --  --      Tissue  01-26-25   Rare Serratia marcescens  --  Serratia marcescens      Fungitell B-D-Glucan,  Bronchial Lavage (01.24.25 @ 14:12)    Fungitell B-D-Glucan,  Bronchial Lavage:   180 The performance characteristics of the Fungitell assay in BAL have  been determined by [facility]; there are no established  criteria for the interpretation of Fungitell results from BAL fluid.  Research studies have evaluated the use of the Fungitell assay in  BAL in both immunocompromised patients (Mycopathologia (2013)  175:33-41) and acute eosinophilic pneumonia (Chest (2003)  123:[id number]).  The Fungitell Beta-D Glucan assay detects (1,3)- Beta-D-glucan from  the following pathogens: Ilsa spp., Acremonium, Aspergillus spp.,  Coccidioides immitis, Fusarium spp., Histoplasma capsulatum,  Trichosporon spp., Sporothrix schenckii, Saccharomyces cerevisiae,  and Pneumocystis jiroveci.  The Fungitell Beta-D Glucan assay does notdetect certain fungal  species such as the genus Cryptococcus, which produces very low  levels of (1,3)- Beta-D-glucan, nor the Zygomycetes, such as  Absidia, Mucor, and Rhizopus, which are not known to produce (1,3)-  Beta-D-glucan. Studies indicateBlastomyces dermatitidis is usually  not detected due to little (1,3)- Beta-D-glucan produced in the  yeast phase.  If sample result is greater than 500 pg/mL, physician may order a  titer of the sample. Please contact [facility] if you would  like to order a retest of this sample to obtain an actual value.  Samples are held for 1 week after initial testing date.  Validation of bronchial specimens was performed on undiluted  samples. Any dilution that occurs during processing before receipt  at [facility] will affect the quantitation  ____________________________________________________________  Performed at:  [facility]  [address] [place] [address], [address]  [place] [address]  : Ciro Pryor, PhD ALBERTO (ABB)  CLIA # [id number]  FLAG Interpretation: A = Abnormal, H = High, L = Low                  WBC Count: 11.02 (02-01-25 @ 00:42)  WBC Count: 9.40 (01-31-25 @ 00:30)  WBC Count: 8.96 (01-30-25 @ 00:24)  WBC Count: 7.37 (01-29-25 @ 00:58)  WBC Count: 8.40 (01-28-25 @ 00:24)  WBC Count: 10.28 (01-27-25 @ 00:54)  WBC Count: 11.39 (01-26-25 @ 00:27)       Bilirubin Total: 0.5 mg/dL (02-01-25 @ 00:42)  Aspartate Aminotransferase (AST/SGOT): 14 U/L (02-01-25 @ 00:42)  Alanine Aminotransferase (ALT/SGPT): 11 U/L (02-01-25 @ 00:42)  Alkaline Phosphatase: 121 U/L (02-01-25 @ 00:42)    Bilirubin Total: 0.5 mg/dL (01-31-25 @ 00:30)  Aspartate Aminotransferase (AST/SGOT): 16 U/L (01-31-25 @ 00:30)  Alanine Aminotransferase (ALT/SGPT): 10 U/L (01-31-25 @ 00:30)  Alkaline Phosphatase: 123 U/L (01-31-25 @ 00:30)    Bilirubin Total: 0.5 mg/dL (01-30-25 @ 00:24)  Aspartate Aminotransferase (AST/SGOT): 21 U/L (01-30-25 @ 00:24)  Alanine Aminotransferase (ALT/SGPT): 14 U/L (01-30-25 @ 00:24)  Alkaline Phosphatase: 129 U/L (01-30-25 @ 00:24)         Patient is a 54y old  Male who presents with a chief complaint of CAD s/p CABG (31 Jan 2025 15:42)    Being followed by ID for        Interval history:  pt c/o buttocks pain  pt seems uncomfortable  back lesion still with discharge   secretions allegedly still blood tinged overnight   No other acute events      PAST MEDICAL & SURGICAL HISTORY:  Type 2 diabetes mellitus      Hypertension      End stage renal disease  started HD 2/2019 T, Th, Sat via right chest permacath      Bone spur  right shoulder- hx of - sx done      Anemia      Injury of right wrist  hx of at age 14      History of hyperkalemia  before HD- K-6.2 - to repeat in am of sx, pt had HD today      S/P arthroscopy of right shoulder  2010      History of vascular access device  right chest permacath - 2/2019      H/O right wrist surgery  tendons repair - at age 14      AV fistula      H/O ventral hernia repair      S/P cholecystectomy        Allergies    No Known Allergies    Intolerances      Antimicrobials:    linezolid  IVPB      linezolid  IVPB 600 milliGRAM(s) IV Intermittent every 12 hours  meropenem  IVPB 500 milliGRAM(s) IV Intermittent every 24 hours    MEDICATIONS  (STANDING):  acetylcysteine 10%  Inhalation 4 milliLiter(s) Inhalation every 6 hours  albuterol    0.083% 2.5 milliGRAM(s) Nebulizer every 6 hours  ascorbic acid 500 milliGRAM(s) Oral daily  aspirin  chewable 81 milliGRAM(s) Oral daily  atorvastatin 80 milliGRAM(s) Oral at bedtime  chlorhexidine 0.12% Liquid 15 milliLiter(s) Oral Mucosa every 12 hours  chlorhexidine 2% Cloths 1 Application(s) Topical daily  chlorhexidine 4% Liquid 1 Application(s) Topical <User Schedule>  dextrose 50% Injectable 50 milliLiter(s) IV Push every 15 minutes  epoetin ignacia (EPOGEN) Injectable 28484 Unit(s) IV Push <User Schedule>  heparin   Injectable 5000 Unit(s) SubCutaneous every 8 hours  insulin lispro (ADMELOG) corrective regimen sliding scale   SubCutaneous every 6 hours  linezolid  IVPB      linezolid  IVPB 600 milliGRAM(s) IV Intermittent every 12 hours  melatonin 5 milliGRAM(s) Oral at bedtime  meropenem  IVPB 500 milliGRAM(s) IV Intermittent every 24 hours  methocarbamol 500 milliGRAM(s) Oral every 8 hours  mirtazapine 7.5 milliGRAM(s) Oral daily  Nephro-elicia 1 Tablet(s) Oral daily  pantoprazole  Injectable 40 milliGRAM(s) IV Push daily  polyethylene glycol 3350 17 Gram(s) Oral two times a day  pregabalin 50 milliGRAM(s) Oral every 8 hours  sodium chloride 0.65% Nasal 1 Spray(s) Both Nostrils every 12 hours  sodium chloride 0.9%. 1000 milliLiter(s) (10 mL/Hr) IV Continuous <Continuous>  theophylline Syrup 50 milliGRAM(s) Oral every 6 hours  traZODone 50 milliGRAM(s) Oral at bedtime      Vital Signs Last 24 Hrs  T(C): 37.2 (02-01-25 @ 04:00), Max: 37.2 (02-01-25 @ 04:00)  T(F): 99 (02-01-25 @ 04:00), Max: 99 (02-01-25 @ 04:00)  HR: 102 (02-01-25 @ 09:00) (65 - 111)  BP: 115/64 (02-01-25 @ 09:00) (95/54 - 181/84)  BP(mean): 83 (02-01-25 @ 09:00) (69 - 110)  RR: 25 (02-01-25 @ 09:00) (12 - 39)  SpO2: 98% (02-01-25 @ 09:00) (94% - 100%)    Physical Exam:    Constitutional  pt uncomfortable    HEENT PERRLA EOMI,No pallor or icterus    No oral exudate or erythema    Neck trach     Chest Good AE,CTA    CVS  S1 S2     Abd soft BS normal No tenderness    Ext No cyanosis clubbing or edema    IV site no erythema tenderness or discharge    back lesion still with drainage     CNS AAO X 3 no focal    Lab Data:                          8.7    11.02 )-----------( 268      ( 01 Feb 2025 00:42 )             27.6       02-01    132[L]  |  93[L]  |  20  ----------------------------<  114[H]  4.3   |  26  |  3.78[H]    Ca    8.6      01 Feb 2025 00:42  Phos  3.1     02-01  Mg     2.0     02-01    TPro  6.0  /  Alb  2.9[L]  /  TBili  0.5  /  DBili  x   /  AST  14  /  ALT  11  /  AlkPhos  121[H]  02-01          Bronchial  01-30-25   Commensal manjit consistent with body site  --    Few polymorphonuclear leukocytes seen per low power field  Few Squamous epithelial cells seen per low power field  Few Yeast like cells seen per oil power field      Bronchial  01-28-25   Commensal manjit consistent with body site  --    Moderate polymorphonuclear leukocytes per low power field  Numerous Squamous epithelial cells per low power field  Few Yeast like cells per oil power field      .Blood BLOOD  01-27-25   No growth at 4 days  --  --      Tissue  01-26-25   Rare Serratia marcescens  --  Serratia marcescens      Fungitell B-D-Glucan,  Bronchial Lavage (01.24.25 @ 14:12)    Fungitell B-D-Glucan,  Bronchial Lavage:   180 The performance characteristics of the Fungitell assay in BAL have  been determined by [employer]; there are no established  criteria for the interpretation of Fungitell results from BAL fluid.  Research studies have evaluated the use of the Fungitell assay in  BAL in both immunocompromised patients (Mycopathologia (2013)  175:33-41) and acute eosinophilic pneumonia (Chest (2003)  [id number]).  The Fungitell Beta-D Glucan assay detects (1,3)- Beta-D-glucan from  the following pathogens: Ilsa spp., Acremonium, Aspergillus spp.,  Coccidioides immitis, Fusarium spp., Histoplasma capsulatum,  Trichosporon spp., Sporothrix schenckii, Saccharomyces cerevisiae,  and Pneumocystis jiroveci.  The Fungitell Beta-D Glucan assay does notdetect certain fungal  species such as the genus Cryptococcus, which produces very low  levels of (1,3)- Beta-D-glucan, nor the Zygomycetes, such as  Absidia, Mucor, and Rhizopus, which are not known to produce (1,3)-  Beta-D-glucan. Studies indicateBlastomyces dermatitidis is usually  not detected due to little (1,3)- Beta-D-glucan produced in the  yeast phase.  If sample result is greater than 500 pg/mL, physician may order a  titer of the sample. Please contact [facility] if you would  like to order a retest of this sample to obtain an actual value.  Samples are held for 1 week after initial testing date.  Validation of bronchial specimens was performed on undiluted  samples. Any dilution that occurs during processing before receipt  at [facility] will affect the quantitation  ____________________________________________________________  Performed at:  [facility]  [address] [place] [address], [facility] [address]  [place] [address]  : Ciro Pryor, PhD ALBERTO ([facility])  CLIA # [id number]  FLAG Interpretation: A = Abnormal, H = High, L = Low                  WBC Count: 11.02 (02-01-25 @ 00:42)  WBC Count: 9.40 (01-31-25 @ 00:30)  WBC Count: 8.96 (01-30-25 @ 00:24)  WBC Count: 7.37 (01-29-25 @ 00:58)  WBC Count: 8.40 (01-28-25 @ 00:24)  WBC Count: 10.28 (01-27-25 @ 00:54)  WBC Count: 11.39 (01-26-25 @ 00:27)       Bilirubin Total: 0.5 mg/dL (02-01-25 @ 00:42)  Aspartate Aminotransferase (AST/SGOT): 14 U/L (02-01-25 @ 00:42)  Alanine Aminotransferase (ALT/SGPT): 11 U/L (02-01-25 @ 00:42)  Alkaline Phosphatase: 121 U/L (02-01-25 @ 00:42)    Bilirubin Total: 0.5 mg/dL (01-31-25 @ 00:30)  Aspartate Aminotransferase (AST/SGOT): 16 U/L (01-31-25 @ 00:30)  Alanine Aminotransferase (ALT/SGPT): 10 U/L (01-31-25 @ 00:30)  Alkaline Phosphatase: 123 U/L (01-31-25 @ 00:30)    Bilirubin Total: 0.5 mg/dL (01-30-25 @ 00:24)  Aspartate Aminotransferase (AST/SGOT): 21 U/L (01-30-25 @ 00:24)  Alanine Aminotransferase (ALT/SGPT): 14 U/L (01-30-25 @ 00:24)  Alkaline Phosphatase: 129 U/L (01-30-25 @ 00:24)

## 2025-02-01 NOTE — PROCEDURE NOTE - NSICDXPROCEDURE_GEN_ALL_CORE_FT
PROCEDURES:  Bronchoscopy, repeat, with therapeutic tracheobronchial tree aspiration 31-Jan-2025 20:14:48  Dudley Patino

## 2025-02-01 NOTE — CONSULT NOTE ADULT - ASSESSMENT
54M with PMH of HTN, HLD, ESRD on HD MWF via LUE AVF, ascites (requiring repeat paracenteses q4-6wks), NICM with moderate LV dysfunction w/ EF 35-40%(2023) and CAD s/p atherectomy + SALLIE to D1(4/11/2024) presents to [facility] transferred from [facility]. Patient initially presented to [facility] ED with shortness of breath. Surgery consulted for sacral decub and back abscess. Sp successful I&D of back abscess and sacral decub debridement     Plan:   - Follow up cultures  - Status post I&D of back abscess  - Status post bedside debridement of sacral decubitus ulcer     Discussed with Dr. Thomson

## 2025-02-01 NOTE — PROGRESS NOTE ADULT - SUBJECTIVE AND OBJECTIVE BOX
Patient seen and examined at the bedside.    Remained critically ill on continuous ICU monitoring.    OBJECTIVE:  Vital Signs Last 24 Hrs  T(C): 37.2 (01 Feb 2025 04:00), Max: 37.2 (01 Feb 2025 04:00)  T(F): 99 (01 Feb 2025 04:00), Max: 99 (01 Feb 2025 04:00)  HR: 95 (01 Feb 2025 07:00) (65 - 111)  BP: 133/68 (01 Feb 2025 07:00) (95/54 - 181/84)  BP(mean): 95 (01 Feb 2025 07:00) (69 - 110)  RR: 22 (01 Feb 2025 07:00) (12 - 39)  SpO2: 95% (01 Feb 2025 07:00) (94% - 100%)    Parameters below as of 01 Feb 2025 05:54  Patient On (Oxygen Delivery Method): ventilator          Physical Exam:   General: Alert and interactive,   Neurology: Oriented, following commands. ambulates  Eyes: bilateral pupils equal and reactive   ENT/Neck: Neck supple, trachea midline, No JVD   Respiratory: Clear bilaterally   CV: S1S2, no murmurs        [x] Sinus rhythm, [x] Temporary pacing - standby mode  Abdominal: Soft, NT, ND +BS   Extremities: 1-2+ pedal edema noted, + peripheral pulses   Skin: No Rashes, Hematoma, Ecchymosis            Assessment:  55 yo M s/p CABG x 3 on 12/30/24    Postop acute respiratory failure  Acute blood loss anemia  Ascites    ESRD on iHD   E coli bacteremia 2/2 pneumonia  VRE E. Faecium   HSV infection  Tracheomalacia     Plan:   ***Neuro***  [x] Nonfocal  Post operative neuro assessment   Gabapentin, Robaxin, Lido patches & PRNs for pain management  Nightly Trazodone, Remeron, Melatonin   OOBTC and walk as tolerated    ***Cardiovascular***  Invasive hemodynamic monitoring, assess perfusion indices   SR / Hct 27.6% / Lactate 1.0  Continuos reassessment of hemodynamics  [x]  ASA [x] Statin  Intermittent bradycardia/junction with hypotension - started on Theophyline  Serial EKG and cardiac enzymes     ***Pulmonary***  Bronched yesterday - BAL pending  [x] HFTC - 40L/40%  Titration of FiO2 and PEEP, follow SpO2, CXR, blood gasses     Mode: standby  FiO2: 40            ***GI***  [x] TF's at goal of 65 cc's/hr   [x] Protonix for stress ulcer prophylaxis  Bowel regimen with Miralax     ***Renal***  [x] ESRD on HD   Continue to monitor I/Os, BUN/Creatinine.   Replete lytes PRN  Nephro-Lisette & EPOGEN for renal support    ***ID***  Sepsis/septic shock due to ESBL E. coli - Meropenem   Zyvox started for VRE bacteremia     ***Endocrine***  [x]  DM2 : HbA1c 5.6%                - [x] ISS              - Need tight glycemic control to prevent wound infection.        Patient requires continuous monitoring with bedside rhythm monitoring, pulse oximetry monitoring, and continuous central venous and arterial pressure monitoring; and intermittent blood gas analysis. Care plan discussed with the ICU care team.   Patient remained critical, at risk for life threatening decompensation.    I have spent 30 minutes providing critical care management to this patient.    By signing my name below, I, Sonia Sánchez, attest that this documentation has been prepared under the direction and in the presence of Shar Park NP   Electronically signed: Autumn Gomez, 02-01-25 @ 08:05    I, Shar Park , personally performed the services described in this documentation. all medical record entries made by the autumn were at my direction and in my presence. I have reviewed the chart and agree that the record reflects my personal performance and is accurate and complete  Electronically signed: Shar Park NP

## 2025-02-01 NOTE — PROGRESS NOTE ADULT - SUBJECTIVE AND OBJECTIVE BOX
MR#[id number]  PATIENT NAME:-ALAINA CANO    DATE OF SERVICE: 25 @ 07:34  Patient was seen and examined by Solo Quick MD on    25 @ 07:34 .  Interim events noted.Consultant notes ,Labs,Telemetry reviewed by me     Covering for [facility] -Katya Carr, Jody, Phillip Panchal, Jeffery Perez  Office Number: [phone]       HOSPITAL COURSE: HPI:  54M with PMH of HTN, HLD, ESRD on HD MWF via LUE AVF, ascites (requiring repeat paracenteses q4-6wks), NICM with moderate LV dysfunction w/ EF 35-40%() and CAD s/p atherectomy + SALLIE to D1(2024) presents to [facility] transferred from [facility]. Patient initially presented to [facility] ED with shortness of breath. Of note he was recently admitted from - for acute cholecystitis s/p cholecystectomy. In [facility] ED, pt was hypoxic to 86% on RA, trop 1.7K, EKG no new changes, CXR fluid overload. Admitted for AHRF 2/2 fluid overload. Pt received HD on , ,  and . DAPT was resumed, TTE showed improvement in LVEF to 40-45%. Stress test was abnormal with 1mm inferior STD, reversible ischemia in the inferior wall and fixed defects in the lateral, anterior and apical walls with post stress EF of 24%. Pt was then transferred to [facility] for cardiac cath which showed pLAD 70% ISR, mLCx 70%, D1 severe dz. Patient now transferred to [facility] CTS Dr. Rivers for CABG eval. Patient denies any current SOB or chest pain on admission. Otherwise denies headaches, abdominal pain, urinary or bowel changes, fevers, chills, N/V/D, sick contacts.  (24 Dec 2024 05:07)      INTERIM EVENTS:Patient seen at bedside ,interim events noted.   Cardiac cath  pLAD 70% ISR, mLCx 70%, D1 severe dz.   -POD#1-C3L free LIMA-LAD; SVG-Diag; SVG-leftPL Awake OOB extubated Sinus rhythm on Dobutamine gtt  - Was intubated last night for airway protection s/p bronchoscopy This morning had IABP inserted  remains sedated intubated Sinus Rhythm  - s/p IABP insertion, sepsis/septic shock due to GNR/ECOLI -Paracentesis for suspected bacterial peritonitis-no growth  -Awake on nocturnal vent support-Sinus rhythm-had been seen by Surgery for sacral wound  -Awake on Vent at nights noted bradycardic put back on  seen by EP  -Awake c/o buttock pain remains on Nocturnal Vent on  and Theophyline   -Awake AF rvr     PMH -reviewed admission note, no change since admission  MEDICATIONS  (STANDING):  acetylcysteine 10%  Inhalation 4 milliLiter(s) Inhalation every 6 hours  albuterol    0.083% 2.5 milliGRAM(s) Nebulizer every 6 hours  ascorbic acid 500 milliGRAM(s) Oral daily  aspirin  chewable 81 milliGRAM(s) Oral daily  atorvastatin 80 milliGRAM(s) Oral at bedtime  chlorhexidine 0.12% Liquid 15 milliLiter(s) Oral Mucosa every 12 hours  chlorhexidine 2% Cloths 1 Application(s) Topical daily  chlorhexidine 4% Liquid 1 Application(s) Topical <User Schedule>  dextrose 50% Injectable 50 milliLiter(s) IV Push every 15 minutes  epoetin ignacia (EPOGEN) Injectable 35193 Unit(s) IV Push <User Schedule>  heparin   Injectable 5000 Unit(s) SubCutaneous every 8 hours  insulin lispro (ADMELOG) corrective regimen sliding scale   SubCutaneous every 6 hours  linezolid  IVPB      linezolid  IVPB 600 milliGRAM(s) IV Intermittent every 12 hours  melatonin 5 milliGRAM(s) Oral at bedtime  meropenem  IVPB 500 milliGRAM(s) IV Intermittent every 24 hours  methocarbamol 500 milliGRAM(s) Oral every 8 hours  mirtazapine 7.5 milliGRAM(s) Oral daily  Nephro-elicia 1 Tablet(s) Oral daily  pantoprazole  Injectable 40 milliGRAM(s) IV Push daily  polyethylene glycol 3350 17 Gram(s) Oral two times a day  pregabalin 50 milliGRAM(s) Oral every 8 hours  sodium chloride 0.65% Nasal 1 Spray(s) Both Nostrils every 12 hours  sodium chloride 0.9%. 1000 milliLiter(s) (10 mL/Hr) IV Continuous <Continuous>  theophylline Syrup 50 milliGRAM(s) Oral every 6 hours  traZODone 50 milliGRAM(s) Oral at bedtime    MEDICATIONS  (PRN):  acetaminophen     Tablet .. 650 milliGRAM(s) Oral every 6 hours PRN Mild Pain (1 - 3)  lidocaine/prilocaine Cream 1 Application(s) Topical daily PRN pre-HD  oxyCODONE    IR 5 milliGRAM(s) Oral every 4 hours PRN Moderate Pain (4 - 6)  oxyCODONE    IR 10 milliGRAM(s) Oral every 4 hours PRN Severe Pain (7 - 10)  sodium chloride 0.9% lock flush 10 milliLiter(s) IV Push every 1 hour PRN Pre/post blood products, medications, blood draw, and to maintain line patency            REVIEW OF SYSTEMS:  Constitutional: [ ] fever, [ ]weight loss,  [ ]fatigue [ ]weight gain  Eyes: [ ] visual changes  Respiratory: [ ]shortness of breath;  [ ] cough, [ ]wheezing, [ ]chills, [ ]hemoptysis  Cardiovascular: [ ] chest pain, [ ]palpitations, [ ]dizziness,  [ ]leg swelling[ ]orthopnea[ ]PND  Gastrointestinal: [ ] abdominal pain, [ ]nausea, [ ]vomiting,  [ ]diarrhea [ ]Constipation [ ]Melena  Genitourinary: [ ] dysuria, [ ] hematuria [ ]Vigil  Neurologic: [ ] headaches [ ] tremors[ ]weakness [ ]Paralysis Right[ ] Left[ ]  Skin: [ ] itching, [ ]burning, [ ] rashes  Endocrine: [ ] heat or cold intolerance  Musculoskeletal: [ ] joint pain or swelling; [ ] muscle, back, or extremity pain  Psychiatric: [ ] depression, [ ]anxiety, [ ]mood swings, or [ ]difficulty sleeping  Hematologic: [ ] easy bruising, [ ] bleeding gums    [ ] All remaining systems negative except as per above.   [ ]Unable to obtain.  [x] No change in ROS since admission      Vital Signs Last 24 Hrs  T(C): 37.2 (2025 04:00), Max: 37.2 (2025 04:00)  T(F): 99 (2025 04:00), Max: 99 (2025 04:00)  HR: 95 (2025 07:00) (65 - 111)  BP: 133/68 (2025 07:00) (95/54 - 181/84)  BP(mean): 95 (2025 07:00) (69 - 110)  RR: 22 (2025 07:00) (12 - 39)  SpO2: 95% (2025 07:00) (94% - 100%)    Parameters below as of 2025 05:54  Patient On (Oxygen Delivery Method): ventilator      I&O's Summary    2025 07:01  -  2025 07:00  --------------------------------------------------------  IN: 2474.4 mL / OUT: 1000 mL / NET: 1474.4 mL        PHYSICAL EXAM:  General: No acute distress BMI-  HEENT: EOMI, PERRL  Neck: Supple, [ ] JVD  Lungs: Equal air entry bilaterally; [ ] rales [ ] wheezing [ ] rhonchi  Heart: Regular rate and rhythm; [x ] murmur   2/6 [ x] systolic [ ] diastolic [ ] radiation[ ] rubs [ ]  gallops  Abdomen: Nontender, bowel sounds present  Extremities: No clubbing, cyanosis, [ ] edema [ ]Pulses  equal and intact  Nervous system:  Alert & Oriented X3, no focal deficits  Psychiatric: Normal affect  Skin: No rashes or lesions    LABS:      132[L]  |  93[L]  |  20  ----------------------------<  114[H]  4.3   |  26  |  3.78[H]    Ca    8.6      2025 00:42  Phos  3.1       Mg     2.0         TPro  6.0  /  Alb  2.9[L]  /  TBili  0.5  /  DBili  x   /  AST  14  /  ALT  11  /  AlkPhos  121[H]      Creatinine Trend: 3.78<--, 5.05<--, 3.99<--, 5.51<--, 4.57<--, 6.22<--                        8.7    11.02 )-----------( 268      ( 2025 00:42 )             27.6     Xray Chest 1 View- PORTABLE-Routine (Xray Chest 1 View- PORTABLE-Routine in AM.) (25 @ 07:12) >  FINDINGS:    2025, 6:10 AM  Enteric tube courses inferiorly towards the stomach with tip collimated   out of view. Median sternotomy.  Near complete opacification of the left hemithorax, may be due to large   pleural effusion with associated atelectasis. Small right pleural   effusion. No pneumothorax.  The heart size cannot be accurately assessed on this projection.  No acute osseous abnormalities.    2025, 4:30 PM  Increased interstitial opacities in the right lung.    2025, 6:37 AM  Moderate left pleural effusion, slightly decreased. Improving aeration of   the left upper lung.    IMPRESSION:  Moderate left and small right pleural effusion with associated   atelectasis.        12 Lead ECG (25 @ 22:10) >  SINUS BRADYCARDIA  LEFT AXIS DEVIATION  INCOMPLETE RIGHT BUNDLE BRANCH BLOCK  ANTEROLATERAL INFARCT (CITED ON OR BEFORE 2025) , AGE UNDETERMINED  POSSIBLE INFERIOR INFARCT , AGE UNDETERMINED  ABNORMAL ECG     TTE W or WO Ultrasound Enhancing Agent (25 @ 12:42) >  CONCLUSIONS:      1. This was a limited study to assess left ventricular systolic function.   2. Left ventricular systolic function is normal with an ejection fraction of 60 % by Michaels's method of disks.   3. Moderately enlarged right ventricular cavity size and mild to moderately reduced right ventricular systolic function.   4. Moderate pericardial effusion noted adjacent to the right atrium.   5. There is no definitive evidence of RA diastolic collapse in the available views. The pericardium proximal to the RV free wall was not well visualized. Due to limited views (as above, and no subcostal views), the study is indeterminate for the presence of echocardiographic signs of tamponade.   6. Left atrium is severely dilated.   7. Compared to the transthoracic echocardiogram performed on 2025, this was also a limited study. The area proximal to the right atrial free wall is better visualized on the present study and the echofree space most consistent with a pericardial effusion is more evident. There is again visual evidence of right ventricular dysfunction.

## 2025-02-01 NOTE — PROCEDURE NOTE - NSPOSTCAREGUIDE_GEN_A_CORE
Care for catheter as per unit/ICU protocols
Verbal/written post procedure instructions were given to patient/caregiver

## 2025-02-01 NOTE — PROCEDURE NOTE - NSICDXIRPREOP_GEN_A_CORE_FT
PRE-OP DIAGNOSIS:  Mucus plugging of bronchi 12-Jan-2025 12:26:18  Dudley Patino  Acute hypoxic respiratory failure 12-Jan-2025 12:26:48  Dudley Patino  

## 2025-02-01 NOTE — CONSULT NOTE ADULT - SUBJECTIVE AND OBJECTIVE BOX
Surgery Consult Note    HPI:  54M with PMH of HTN, HLD, ESRD on HD MWF via LUE AVF, ascites (requiring repeat paracenteses q4-6wks), NICM with moderate LV dysfunction w/ EF 35-40%(2023) and CAD s/p atherectomy + SALLIE to D1(4/11/2024) presents to [facility] transferred from [facility]. Patient initially presented to [facility] ED with shortness of breath. Of note he was recently admitted from 09/27-12/02 for acute cholecystitis s/p cholecystectomy. In [facility] ED, pt was hypoxic to 86% on RA, trop 1.7K, EKG no new changes, CXR fluid overload. Admitted for AHRF 2/2 fluid overload. Pt received HD on 12/18, 12/19, 12/20 and 12/22. DAPT was resumed, TTE showed improvement in LVEF to 40-45%. Stress test was abnormal with 1mm inferior STD, reversible ischemia in the inferior wall and fixed defects in the lateral, anterior and apical walls with post stress EF of 24%. Pt was then transferred to [facility] for cardiac cath which showed pLAD 70% ISR, mLCx 70%, D1 severe dz. Patient now transferred to [facility] CTS Dr. Rivers for CABG eval. Patient denies any current SOB or chest pain on admission. Otherwise denies headaches, abdominal pain, urinary or bowel changes, fevers, chills, N/V/D, sick contacts.     Surgery consulted for sacral decub and back abscess. Sp successful I&D of back abscess and sacral decub debridement     PAST MEDICAL & SURGICAL HISTORY:  Type 2 diabetes mellitus      Hypertension      End stage renal disease  started HD 2/2019 T, Th, Sat via right chest permacath      Bone spur  right shoulder- hx of - sx done      Anemia      Injury of right wrist  hx of at age 14      History of hyperkalemia  before HD- K-6.2 - to repeat in am of sx, pt had HD today      S/P arthroscopy of right shoulder  2010      History of vascular access device  right chest permacath - 2/2019      H/O right wrist surgery  tendons repair - at age 14      AV fistula      H/O ventral hernia repair      S/P cholecystectomy        Allergies    No Known Allergies    Intolerances      Home Medications:  aspirin 81 mg oral delayed release tablet: 1 tab(s) orally once a day (23 Dec 2024 16:58)  calcium acetate 667 mg oral tablet: 1 tab(s) orally 3 times a day (23 Dec 2024 16:58)  carvedilol 12.5 mg oral tablet: 1 tab(s) orally every 12 hours (23 Dec 2024 16:56)  clopidogrel 75 mg oral tablet: 1 tab(s) orally once a day (23 Dec 2024 16:56)  furosemide 20 mg oral tablet: 1 tab(s) orally once a day (23 Dec 2024 16:58)  hydrALAZINE 25 mg oral tablet: 1 tab(s) orally 3 times a day (23 Dec 2024 16:58)  lisinopril 40 mg oral tablet: 1 tab(s) orally once a day (23 Dec 2024 16:58)  lovastatin 10 mg oral tablet: 1 tab(s) orally once a day (23 Dec 2024 16:56)  NIFEdipine 90 mg oral tablet, extended release: 1 tab(s) orally once a day (23 Dec 2024 16:58)  Emani-Elicia oral tablet: 1 tab(s) orally once a day (23 Dec 2024 16:58)  traZODone 100 mg oral tablet: 1 tab(s) orally once a day (at bedtime) (23 Dec 2024 16:56)    MEDICATIONS  (STANDING):  acetylcysteine 10%  Inhalation 4 milliLiter(s) Inhalation every 6 hours  albuterol    0.083% 2.5 milliGRAM(s) Nebulizer every 6 hours  ascorbic acid 500 milliGRAM(s) Oral daily  aspirin  chewable 81 milliGRAM(s) Oral daily  atorvastatin 80 milliGRAM(s) Oral at bedtime  chlorhexidine 0.12% Liquid 15 milliLiter(s) Oral Mucosa every 12 hours  chlorhexidine 2% Cloths 1 Application(s) Topical daily  chlorhexidine 4% Liquid 1 Application(s) Topical <User Schedule>  dextrose 50% Injectable 50 milliLiter(s) IV Push every 15 minutes  epoetin ignacia (EPOGEN) Injectable 02132 Unit(s) IV Push <User Schedule>  heparin   Injectable 5000 Unit(s) SubCutaneous every 8 hours  insulin lispro (ADMELOG) corrective regimen sliding scale   SubCutaneous every 6 hours  linezolid  IVPB      linezolid  IVPB 600 milliGRAM(s) IV Intermittent every 12 hours  melatonin 5 milliGRAM(s) Oral at bedtime  meropenem  IVPB 500 milliGRAM(s) IV Intermittent every 24 hours  methocarbamol 500 milliGRAM(s) Oral every 8 hours  mirtazapine 7.5 milliGRAM(s) Oral daily  Nephro-elicia 1 Tablet(s) Oral daily  pantoprazole  Injectable 40 milliGRAM(s) IV Push daily  polyethylene glycol 3350 17 Gram(s) Oral two times a day  pregabalin 50 milliGRAM(s) Oral every 8 hours  sodium chloride 0.65% Nasal 1 Spray(s) Both Nostrils every 12 hours  sodium chloride 0.9%. 1000 milliLiter(s) (10 mL/Hr) IV Continuous <Continuous>  theophylline Syrup 50 milliGRAM(s) Oral every 6 hours  traZODone 50 milliGRAM(s) Oral at bedtime      SOCIAL HISTORY:  FAMILY HISTORY:  FH: hypertension  mother, father- alive    FH: type 2 diabetes  mother, father- alive        ___________________________________________  OBJECTIVE:  Vital Signs Last 24 Hrs  T(C): 37.2 (01 Feb 2025 16:00), Max: 37.3 (01 Feb 2025 12:00)  T(F): 98.9 (01 Feb 2025 16:00), Max: 99.2 (01 Feb 2025 12:00)  HR: 98 (01 Feb 2025 20:00) (75 - 111)  BP: 153/76 (01 Feb 2025 20:00) (95/54 - 153/76)  BP(mean): 106 (01 Feb 2025 20:00) (69 - 106)  RR: 18 (01 Feb 2025 20:00) (12 - 30)  SpO2: 90% (01 Feb 2025 20:00) (89% - 100%)    Parameters below as of 01 Feb 2025 20:00  Patient On (Oxygen Delivery Method): HFTC    CAPILLARY BLOOD GLUCOSE  98 (01 Feb 2025 12:00)      POCT Blood Glucose.: 130 mg/dL (01 Feb 2025 17:33)    I&O's Detail    31 Jan 2025 07:01  -  01 Feb 2025 07:00  --------------------------------------------------------  IN:    DOBUTamine: 38.4 mL    DOBUTamine: 26 mL    Enteral Tube Flush: 100 mL    IV PiggyBack: 750 mL    sodium chloride 0.9%: 120 mL    Vital1.5: 1440 mL  Total IN: 2474.4 mL    OUT:    Other (mL): 1000 mL  Total OUT: 1000 mL    Total NET: 1474.4 mL      01 Feb 2025 07:01  -  01 Feb 2025 20:11  --------------------------------------------------------  IN:    IV PiggyBack: 300 mL    sodium chloride 0.9%: 130 mL    Vital1.5: 360 mL  Total IN: 790 mL    OUT:  Total OUT: 0 mL    Total NET: 790 mL        General: Well developed, well nourished, NAD  Neuro: Alert and oriented, no focal deficits, moves all extremities spontaneously  HEENT: NCAT, EOMI, anicteric, mucosa moist  Respiratory: Airway patent, respirations unlabored  CVS: Regular rate and rhythm  Abdomen: Soft, nontender, nondistended  Extremities: No edema, sensation and movement grossly intact  Skin: Warm, dry, appropriate color  Back: small roughly [id number] sacral decub with overlying eschar, right back abscess with purulent fluid draining   ____________________________________________  LABS:  CBC Full  -  ( 01 Feb 2025 00:42 )  WBC Count : 11.02 K/uL  RBC Count : 2.93 M/uL  Hemoglobin : 8.7 g/dL  Hematocrit : 27.6 %  Platelet Count - Automated : 268 K/uL  Mean Cell Volume : 94.2 fl  Mean Cell Hemoglobin : 29.7 pg  Mean Cell Hemoglobin Concentration : 31.5 g/dL  Auto Neutrophil # : 10.06 K/uL  Auto Lymphocyte # : 0.29 K/uL  Auto Monocyte # : 0.48 K/uL  Auto Eosinophil # : 0.19 K/uL  Auto Basophil # : 0.00 K/uL  Auto Neutrophil % : 91.3 %  Auto Lymphocyte % : 2.6 %  Auto Monocyte % : 4.4 %  Auto Eosinophil % : 1.7 %  Auto Basophil % : 0.0 %    02-01    132[L]  |  93[L]  |  20  ----------------------------<  114[H]  4.3   |  26  |  3.78[H]    Ca    8.6      01 Feb 2025 00:42  Phos  3.1     02-01  Mg     2.0     02-01    TPro  6.0  /  Alb  2.9[L]  /  TBili  0.5  /  DBili  x   /  AST  14  /  ALT  11  /  AlkPhos  121[H]  02-01    LIVER FUNCTIONS - ( 01 Feb 2025 00:42 )  Alb: 2.9 g/dL / Pro: 6.0 g/dL / ALK PHOS: 121 U/L / ALT: 11 U/L / AST: 14 U/L / GGT: x             Urinalysis Basic - ( 01 Feb 2025 00:42 )    Color: x / Appearance: x / SG: x / pH: x  Gluc: 114 mg/dL / Ketone: x  / Bili: x / Urobili: x   Blood: x / Protein: x / Nitrite: x   Leuk Esterase: x / RBC: x / WBC x   Sq Epi: x / Non Sq Epi: x / Bacteria: x            ____________________________________________  MICRO:  RECENT CULTURES:  Specimen Source: Bronchial  Date/Time: 01-30 @ 17:29  Culture Results:   Commensal manjit consistent with body site[!]  Gram Stain:   Few polymorphonuclear leukocytes seen per low power field  Few Squamous epithelial cells seen per low power field  Few Yeast like cells seen per oil power field[!]  Organism: --  Specimen Source: Bronchial  Date/Time: 01-28 @ 11:04  Culture Results:   Commensal manjit consistent with body site[!]  Gram Stain:   Moderate polymorphonuclear leukocytes per low power field  Numerous Squamous epithelial cells per low power field  Few Yeast like cells per oil power field[!]  Organism: --    ____________________________________________  RADIOLOGY:

## 2025-02-01 NOTE — PROCEDURE NOTE - NSBRONCHFINDINGS_GEN_A_CORE_FT
Tracheostomy tube in appropriate position.  Improved secretions on left. Distal bronchi patent.  Minimal secretions noted on right side. Distal bronchi patent.

## 2025-02-01 NOTE — PROGRESS NOTE ADULT - ASSESSMENT
54M with HTN, HLD, ESRD on HD MWF via LUE AVF, ascites (requiring repeat paracenteses q4-6wks), NICM with moderate LV dysfunction w/ EF 35-40%() and CAD s/p atherectomy + SALLIE to D1 (2024) presents to [facility] 2024 as transfer from [facility] (via [facility]) where he presented  with SOB.   In [facility] the [facility] ED, pt was hypoxic to 86% on RA, trop 1.7K, EKG no new changes, CXR fluid overload. Admitted for AHRF 2/2 fluid overload. Pt received HD on , ,  and . DAPT was resumed, TTE showed improvement in LVEF to 40-45%. Stress test was abnormal with 1mm inferior STD, reversible ischemia in the inferior wall and fixed defects in the lateral, anterior and apical walls with post stress EF of 24%. Pt was then transferred to [facility] for cardiac cath which showed pLAD 70% ISR, mLCx 70%, D1 severe dz. Patient now transferred to [facility] 2024 for CABG.       - underwent  C3L free LIMA-LAD; SVG-Diag; SVG-leftPL   - extubated   - hypotension and ECHO showing Systolic HF/depressed BIV Function, currently supported with /pressors.  Labs this evening showing transaminitis   - hypotensive, febrile and reintubated  1/3 - cultures (+) E coli +ESBL bacteremia   - underwent tracheostomy   - left lung collapse s/p bronchoscopy   - seen by plastic surgery / colorectal for sacral wound, no surgical intervention, no evidence of fistula, BC sent  - c/o right arm  pain, started on acyclovir for positive HSV PCR     doing a little better tissue culture with serratia; bronch also growing some yeast and VREC   back with nodule with some discharge    sacral decubitus less tender, some bloody secretions from trach  - still with drainage from back lesion    Recommendations  - continue linezolid- day # 10 since last positive blood culture () - will likely treat for 14 days ( )) . follow cbc   - repeat BC  so far negative  - can continue meropenem for now (tissue culture with serratia, taken from back)- would treat for 10 days from the positive culture ( )  -->   - f/u surgery- to look at back lesion  and decubitus  - yeast in bronch, not clear if true infection, monitor off antifungal for now  - acyclovir, dosed for renal insufficiency      Marla Champion M.D. ,   please reach via teams   If no answer, or after 5PM/ weekends,  then please call  [phone]    Assessment and plan discussed with the primary team .    ID service will be covering over the weekend. Please call for acute issues or questions. [phone]

## 2025-02-01 NOTE — PROGRESS NOTE ADULT - ASSESSMENT
54M with PMH of HTN, HLD, ESRD on HD MWF via LUE AVF, ascites (requiring repeat paracenteses q4-6wks), NICM with moderate LV dysfunction w/ EF 35-40%() and CAD s/p atherectomy + SALLIE to D1(2024) presents to [facility] transferred from [facility]. Patient initially presented to [facility] ED with shortness of breath. Of note he was recently admitted from - for acute cholecystitis s/p cholecystectomy. In [facility] ED, pt was hypoxic to 86% on RA, trop 1.7K, EKG no new changes, CXR fluid overload. Admitted for AHRF 2/2 fluid overload. Pt received HD on , ,  and . DAPT was resumed, TTE showed improvement in LVEF to 40-45%. Stress test was abnormal with 1mm inferior STD, reversible ischemia in the inferior wall and fixed defects in the lateral, anterior and apical walls with post stress EF of 24%.     Pt was then transferred to [facility] for cardiac cath which showed pLAD 70% ISR, mLCx 70%, D1 severe dz. Patient now transferred to [facility] CTS Dr. Rivers for CABG eval.# CAD s/p NSTEMI  Hx CAD s/p PCI LAD   Presented with ADHF elevated troponins  Positive NST1-Cath- pLAD 70% ISR, mLCx 70%, D1 severe dz.   TTE EF 35% Severe TRWas on Plavix-p2y12- 321 been off Plavix since -POD#1-C3L free LIMA-LAD; SVG-Diag; UKO-wuywBS-Oltbehikh on  Sinus rhythm,Amio for AF prophylaxis  -OOB off  Sinus rhythm   -POD#3-On /pressors-EF 25-30% RV failure with transaminitis-Sinus Dhxhsr71/03-POD#4-Was intubated for hypoxia-gradual hypotension on /pressors had IABP inserted this morning  -POD#5-s/p IABP insertion, sepsis/septic shock due to GNR/ECOLI HD cath placed   Bronched yesterday 1/3 - minimal secretions with rust-tinged sputum   ESBL-E Coli bacteremia on meropenam    - POD#7 Atrial Fib ,remains intubated weaning IABP 1:3,off pressors on CRRT  -IABP removed.Remains on vent-Alex 10ppm,off Levo- CVP 10 / MAP 71 / Hct 25.6% / Lactate 1.7-Atrial Fibrillation  -Intubated on Alex 10ppm, gtt, BP better-AF~~90's on Amio-had bronch still febrile Tmax [id number]/-Extubated awake alert on HFNC AF,on Dobutrex-CRRT,Cultures no growth    01/10-OOB on BiPAP Sinus Rhythm on -SR/ MAP 57/ CVP 10/  Hct 26.7 / Lact 1.4  -s/p EDU/DCCV-Sinus rhythm on -SR / MAP 52 / CVP 12/ Hct 25.8 / Lact 0.7-had bronch with BAL Left lung  -Sinus rhythm on -for repeat Bronch-SR / MAP 67 / CVP 11 / Hct 27.4% / Lact 0.8  -s/p Trach on  TTE EF 55%-SR / CVP 2 / MAP 63 / Hct 25.9% / Lactate 0.9  -Awake on Trach collar off  c/o buttock pain had bronch yesterday-BAL-Sinus rhythm  -Sinus rhythm on vent at night-BP stable may need to increase hydrallazine 25 mg q8  -TTE EF 60% still needs Vent support at night Bradycardic trial of Bryce now back on   -Awake alert Sinus rhythm BP elevated  remains on ,Nocturnal vent support  resume Hydralazine 25mg q8    -Reverted to AF rvr      # Acute on Chronic Systolic Heart Failure now improved  EF-35% ,severe TR  Had HD post op  GDMT post op  LVEF improved post bypass but now at 23-30%-BiV dysfunction on  now off pressors  -TTE EF 40%,trace effusion  ECG c/w pericarditis-was on colchicine   01/15-TTE EF 55%  -TTE EF 60%     Vascular evaluated  AVGraft /?steal causing RV failure-'' Very low suspicion of steal Sd and AVF causing current clinical state. ''   VA Duplex Hemodialysis Access, Left (25 @ 13:35) >   Arterial flow volume of 1301 mL/m, and the venous flow volume of  1492 mL/m are consistent with a normally functioning dialysis access  fistula.      # Ascites  Hx chronic ascites  Has drainage q4-6 weeks  Last drained -4600mL  ? ascites related to severe TR  Had iosxxymvcmzs-Ixyxwid-ns growth   CT Abdomen 01/10-Large volume ascites-consider paracentesis  Monitor      # ESRD  Now off CRRT transition to HD

## 2025-02-01 NOTE — PROCEDURE NOTE - NSBRONCHPROCDETAILS_GEN_A_CORE_FT
Indication: left lower lobe collapse    Pre-op Dx:     History:    Preop medication:Diprivan    Xray Findings:    Findings:  Bronchoscope inserted through Tracheostomy tube. Trach noted to be in good position. Airway evaluation revealed Sharp Abena. AUGUSTO and LLL evaluation revealed copius amount of secretions lavaged with normal saline & suctioned multiple times airways with copious amount Bloody mucousy secretions suctioned multiple times using bronchoscope. RUL, RML, RLL revealed  small amount of secretions.. Bronchoscope then withdrawn from tracheostomy tube.. Minimal bleeding noted    CxR  pending

## 2025-02-01 NOTE — PROCEDURE NOTE - NSICDXIRPOSTOP_GEN_A_CORE_FT
POST-OP DIAGNOSIS:  Mucus plugging of bronchi 12-Jan-2025 12:26:58  Dudley Patino  Acute hypoxic respiratory failure 12-Jan-2025 12:27:42  Dudley Patino  
POST-OP DIAGNOSIS:  Mucus plugging of bronchi 12-Jan-2025 12:26:58  Dudley Patino  Acute hypoxic respiratory failure 12-Jan-2025 12:27:42  Dudley Patino  
POST-OP DIAGNOSIS:  Mucus plugging of bronchi 12-Jan-2025 12:26:58  Dudley Patino  Acute respiratory failure with hypoxia 12-Jan-2025 12:27:04  Dudley Patino

## 2025-02-02 LAB
ALBUMIN SERPL ELPH-MCNC: 2.8 G/DL — LOW (ref 3.3–5)
ALP SERPL-CCNC: 125 U/L — HIGH (ref 40–120)
ALT FLD-CCNC: 10 U/L — SIGNIFICANT CHANGE UP (ref 10–45)
ANION GAP SERPL CALC-SCNC: 13 MMOL/L — SIGNIFICANT CHANGE UP (ref 5–17)
AST SERPL-CCNC: 20 U/L — SIGNIFICANT CHANGE UP (ref 10–40)
BASOPHILS # BLD AUTO: 0.07 K/UL — SIGNIFICANT CHANGE UP (ref 0–0.2)
BASOPHILS NFR BLD AUTO: 0.7 % — SIGNIFICANT CHANGE UP (ref 0–2)
BILIRUB SERPL-MCNC: 0.5 MG/DL — SIGNIFICANT CHANGE UP (ref 0.2–1.2)
BUN SERPL-MCNC: 30 MG/DL — HIGH (ref 7–23)
CALCIUM SERPL-MCNC: 8.8 MG/DL — SIGNIFICANT CHANGE UP (ref 8.4–10.5)
CHLORIDE SERPL-SCNC: 90 MMOL/L — LOW (ref 96–108)
CO2 SERPL-SCNC: 26 MMOL/L — SIGNIFICANT CHANGE UP (ref 22–31)
CREAT SERPL-MCNC: 4.78 MG/DL — HIGH (ref 0.5–1.3)
EGFR: 14 ML/MIN/1.73M2 — LOW
EGFR: 14 ML/MIN/1.73M2 — LOW
EOSINOPHIL # BLD AUTO: 0.84 K/UL — HIGH (ref 0–0.5)
EOSINOPHIL NFR BLD AUTO: 8.7 % — HIGH (ref 0–6)
GLUCOSE SERPL-MCNC: 107 MG/DL — HIGH (ref 70–99)
HCT VFR BLD CALC: 27.8 % — LOW (ref 39–50)
HGB BLD-MCNC: 8.5 G/DL — LOW (ref 13–17)
IMM GRANULOCYTES NFR BLD AUTO: 0.5 % — SIGNIFICANT CHANGE UP (ref 0–0.9)
LYMPHOCYTES # BLD AUTO: 0.64 K/UL — LOW (ref 1–3.3)
LYMPHOCYTES # BLD AUTO: 6.6 % — LOW (ref 13–44)
MAGNESIUM SERPL-MCNC: 2.1 MG/DL — SIGNIFICANT CHANGE UP (ref 1.6–2.6)
MCHC RBC-ENTMCNC: 29.5 PG — SIGNIFICANT CHANGE UP (ref 27–34)
MCHC RBC-ENTMCNC: 30.6 G/DL — LOW (ref 32–36)
MCV RBC AUTO: 96.5 FL — SIGNIFICANT CHANGE UP (ref 80–100)
MONOCYTES # BLD AUTO: 1.08 K/UL — HIGH (ref 0–0.9)
NEUTROPHILS # BLD AUTO: 7 K/UL — SIGNIFICANT CHANGE UP (ref 1.8–7.4)
NEUTROPHILS NFR BLD AUTO: 72.3 % — SIGNIFICANT CHANGE UP (ref 43–77)
NRBC # BLD: 0 /100 WBCS — SIGNIFICANT CHANGE UP (ref 0–0)
NRBC BLD-RTO: 0 /100 WBCS — SIGNIFICANT CHANGE UP (ref 0–0)
PHOSPHATE SERPL-MCNC: 3.9 MG/DL — SIGNIFICANT CHANGE UP (ref 2.5–4.5)
PLATELET # BLD AUTO: 236 K/UL — SIGNIFICANT CHANGE UP (ref 150–400)
POTASSIUM SERPL-MCNC: 5 MMOL/L — SIGNIFICANT CHANGE UP (ref 3.5–5.3)
POTASSIUM SERPL-SCNC: 5 MMOL/L — SIGNIFICANT CHANGE UP (ref 3.5–5.3)
PROT SERPL-MCNC: 6.4 G/DL — SIGNIFICANT CHANGE UP (ref 6–8.3)
RBC # BLD: 2.88 M/UL — LOW (ref 4.2–5.8)
SODIUM SERPL-SCNC: 129 MMOL/L — LOW (ref 135–145)
WBC # FLD AUTO: 9.68 K/UL — SIGNIFICANT CHANGE UP (ref 3.8–10.5)

## 2025-02-02 PROCEDURE — 71045 X-RAY EXAM CHEST 1 VIEW: CPT | Mod: 26

## 2025-02-02 PROCEDURE — 99291 CRITICAL CARE FIRST HOUR: CPT

## 2025-02-02 RX ADMIN — POLYETHYLENE GLYCOL 3350 17 GRAM(S): 17 POWDER, FOR SOLUTION ORAL at 05:11

## 2025-02-02 RX ADMIN — Medication 1 TABLET(S): at 12:27

## 2025-02-02 RX ADMIN — MEROPENEM 100 MILLIGRAM(S): 1 INJECTION INTRAVENOUS at 05:50

## 2025-02-02 RX ADMIN — MIRTAZAPINE 7.5 MILLIGRAM(S): 30 TABLET, FILM COATED ORAL at 12:27

## 2025-02-02 RX ADMIN — OXYCODONE HYDROCHLORIDE 10 MILLIGRAM(S): 30 TABLET ORAL at 10:21

## 2025-02-02 RX ADMIN — Medication 1 SPRAY(S): at 05:15

## 2025-02-02 RX ADMIN — PREGABALIN 50 MILLIGRAM(S): 50 CAPSULE ORAL at 14:22

## 2025-02-02 RX ADMIN — HEPARIN SODIUM 5000 UNIT(S): 1000 INJECTION INTRAVENOUS; SUBCUTANEOUS at 14:22

## 2025-02-02 RX ADMIN — Medication 500 MILLIGRAM(S): at 12:27

## 2025-02-02 RX ADMIN — INSULIN LISPRO 2: 100 INJECTION, SOLUTION INTRAVENOUS; SUBCUTANEOUS at 23:39

## 2025-02-02 RX ADMIN — Medication 15 MILLILITER(S): at 05:10

## 2025-02-02 RX ADMIN — Medication 1 APPLICATION(S): at 21:00

## 2025-02-02 RX ADMIN — INSULIN LISPRO 2: 100 INJECTION, SOLUTION INTRAVENOUS; SUBCUTANEOUS at 05:58

## 2025-02-02 RX ADMIN — Medication 2.5 MILLIGRAM(S): at 12:05

## 2025-02-02 RX ADMIN — METHOCARBAMOL 500 MILLIGRAM(S): 500 TABLET, FILM COATED ORAL at 05:11

## 2025-02-02 RX ADMIN — Medication 81 MILLIGRAM(S): at 12:27

## 2025-02-02 RX ADMIN — LINEZOLID 300 MILLIGRAM(S): 2 INJECTION, SOLUTION INTRAVENOUS at 00:12

## 2025-02-02 RX ADMIN — PREGABALIN 50 MILLIGRAM(S): 50 CAPSULE ORAL at 05:11

## 2025-02-02 RX ADMIN — OXYCODONE HYDROCHLORIDE 5 MILLIGRAM(S): 30 TABLET ORAL at 00:12

## 2025-02-02 RX ADMIN — Medication 40 MILLIGRAM(S): at 12:26

## 2025-02-02 RX ADMIN — LINEZOLID 300 MILLIGRAM(S): 2 INJECTION, SOLUTION INTRAVENOUS at 12:27

## 2025-02-02 RX ADMIN — Medication 2.5 MILLIGRAM(S): at 05:34

## 2025-02-02 RX ADMIN — ACETYLCYSTEINE 4 MILLILITER(S): 200 INHALANT RESPIRATORY (INHALATION) at 12:09

## 2025-02-02 RX ADMIN — HEPARIN SODIUM 5000 UNIT(S): 1000 INJECTION INTRAVENOUS; SUBCUTANEOUS at 05:11

## 2025-02-02 RX ADMIN — OXYCODONE HYDROCHLORIDE 10 MILLIGRAM(S): 30 TABLET ORAL at 21:00

## 2025-02-02 RX ADMIN — METHOCARBAMOL 500 MILLIGRAM(S): 500 TABLET, FILM COATED ORAL at 14:22

## 2025-02-02 RX ADMIN — Medication 1 SPRAY(S): at 18:23

## 2025-02-02 RX ADMIN — OXYCODONE HYDROCHLORIDE 5 MILLIGRAM(S): 30 TABLET ORAL at 07:18

## 2025-02-02 RX ADMIN — OXYCODONE HYDROCHLORIDE 10 MILLIGRAM(S): 30 TABLET ORAL at 21:30

## 2025-02-02 RX ADMIN — Medication 15 MILLILITER(S): at 18:23

## 2025-02-02 RX ADMIN — HEPARIN SODIUM 5000 UNIT(S): 1000 INJECTION INTRAVENOUS; SUBCUTANEOUS at 21:01

## 2025-02-02 RX ADMIN — Medication 5 MILLIGRAM(S): at 21:00

## 2025-02-02 RX ADMIN — OXYCODONE HYDROCHLORIDE 5 MILLIGRAM(S): 30 TABLET ORAL at 06:48

## 2025-02-02 RX ADMIN — ATORVASTATIN CALCIUM 80 MILLIGRAM(S): 80 TABLET, FILM COATED ORAL at 21:00

## 2025-02-02 RX ADMIN — Medication 50 MILLIGRAM(S): at 21:01

## 2025-02-02 RX ADMIN — Medication 2.5 MILLIGRAM(S): at 23:00

## 2025-02-02 RX ADMIN — ACETYLCYSTEINE 4 MILLILITER(S): 200 INHALANT RESPIRATORY (INHALATION) at 23:00

## 2025-02-02 RX ADMIN — Medication 2.5 MILLIGRAM(S): at 17:30

## 2025-02-02 RX ADMIN — ACETYLCYSTEINE 4 MILLILITER(S): 200 INHALANT RESPIRATORY (INHALATION) at 17:31

## 2025-02-02 RX ADMIN — OXYCODONE HYDROCHLORIDE 10 MILLIGRAM(S): 30 TABLET ORAL at 11:30

## 2025-02-02 RX ADMIN — OXYCODONE HYDROCHLORIDE 5 MILLIGRAM(S): 30 TABLET ORAL at 01:12

## 2025-02-02 RX ADMIN — ACETYLCYSTEINE 4 MILLILITER(S): 200 INHALANT RESPIRATORY (INHALATION) at 05:34

## 2025-02-02 RX ADMIN — METHOCARBAMOL 500 MILLIGRAM(S): 500 TABLET, FILM COATED ORAL at 21:00

## 2025-02-02 RX ADMIN — Medication 1 APPLICATION(S): at 05:11

## 2025-02-02 NOTE — PROVIDER CONTACT NOTE (CHANGE IN STATUS NOTIFICATION) - ASSESSMENT
Patient disconnecting himself from high flow trach collar. Patient disconnecting himself from tube feedings. Patient found to be ambulating about the room. Patient also found to be stepping on his pacemaker wires. Patient re-educated on safety precautions and to use call button several times prior to this instance for similar behaviors. Patient remains alert and oriented to person, place, time and event (via writing board). Patient again reeducated on safety precautions and to call before getting up. Also told that he is not to touch any medical equipment. Patient seemed agreeable. Patient disconnecting himself from high flow trach collar. Patient disconnecting himself from tube feedings. Patient found to be ambulating about the room. Patient also found to be stepping on his pacemaker wires. Patient re-educated on safety precautions and to use call button several times prior to this instance for similar behaviors. Patient remains alert and oriented to person, place, time and event (via writing board). Vitals WDL. No overt signs of medical distress. Toileting offered. Denies pain. Patient again reeducated on safety precautions and to call before getting up. Also told that he is not to touch any medical equipment. Patient seemed agreeable.

## 2025-02-02 NOTE — PROGRESS NOTE ADULT - SUBJECTIVE AND OBJECTIVE BOX
SUBJECTIVE:   Pt seen and examined at bedside. No acute events overnight. Pt laying in bed comfortably. Pt denies nausea, vomiting, chest pain, SOB, fever, chills. Pt denies/endorses passing flatus/BM        Vital Signs Last 24 Hrs  T(C): 37.1 (02 Feb 2025 04:00), Max: 37.3 (01 Feb 2025 12:00)  T(F): 98.8 (02 Feb 2025 04:00), Max: 99.2 (01 Feb 2025 12:00)  HR: 107 (02 Feb 2025 09:07) (75 - 109)  BP: 137/78 (02 Feb 2025 09:00) (100/65 - 179/81)  BP(mean): 99 (02 Feb 2025 09:00) (79 - 115)  RR: 18 (02 Feb 2025 09:00) (13 - 18)  SpO2: 97% (02 Feb 2025 09:07) (89% - 100%)    Parameters below as of 02 Feb 2025 08:00  Patient On (Oxygen Delivery Method): [facility]    O2 Concentration (%): 40      PHYSICAL EXAM:  General: Well developed, well nourished, NAD  Neuro: Alert and oriented, no focal deficits, moves all extremities spontaneously  HEENT: NCAT, EOMI, anicteric, mucosa moist  Respiratory: Airway patent, respirations unlabored  CVS: Regular rate and rhythm  Abdomen: Soft, nontender, nondistended  Extremities: No edema, sensation and movement grossly intact  Skin: Warm, dry, appropriate color  Back: small roughly [id number] sacral decub, open wound        I&O's Summary    01 Feb 2025 07:01  -  02 Feb 2025 07:00  --------------------------------------------------------  IN: 1400 mL / OUT: 0 mL / NET: 1400 mL      I&O's Detail    01 Feb 2025 07:01  -  02 Feb 2025 07:00  --------------------------------------------------------  IN:    IV PiggyBack: 300 mL    sodium chloride 0.9%: 220 mL    Vital1.5: 880 mL  Total IN: 1400 mL    OUT:  Total OUT: 0 mL    Total NET: 1400 mL          MEDICATIONS  (STANDING):  acetylcysteine 10%  Inhalation 4 milliLiter(s) Inhalation every 6 hours  albuterol    0.083% 2.5 milliGRAM(s) Nebulizer every 6 hours  ascorbic acid 500 milliGRAM(s) Oral daily  aspirin  chewable 81 milliGRAM(s) Oral daily  atorvastatin 80 milliGRAM(s) Oral at bedtime  chlorhexidine 0.12% Liquid 15 milliLiter(s) Oral Mucosa every 12 hours  chlorhexidine 2% Cloths 1 Application(s) Topical daily  chlorhexidine 4% Liquid 1 Application(s) Topical <User Schedule>  dextrose 50% Injectable 50 milliLiter(s) IV Push every 15 minutes  epoetin ignacia (EPOGEN) Injectable 41587 Unit(s) IV Push <User Schedule>  heparin   Injectable 5000 Unit(s) SubCutaneous every 8 hours  insulin lispro (ADMELOG) corrective regimen sliding scale   SubCutaneous every 6 hours  linezolid  IVPB      linezolid  IVPB 600 milliGRAM(s) IV Intermittent every 12 hours  melatonin 5 milliGRAM(s) Oral at bedtime  meropenem  IVPB 500 milliGRAM(s) IV Intermittent every 24 hours  methocarbamol 500 milliGRAM(s) Oral every 8 hours  mirtazapine 7.5 milliGRAM(s) Oral daily  Nephro-elicia 1 Tablet(s) Oral daily  pantoprazole  Injectable 40 milliGRAM(s) IV Push daily  polyethylene glycol 3350 17 Gram(s) Oral two times a day  pregabalin 50 milliGRAM(s) Oral every 8 hours  sodium chloride 0.65% Nasal 1 Spray(s) Both Nostrils every 12 hours  sodium chloride 0.9%. 1000 milliLiter(s) (10 mL/Hr) IV Continuous <Continuous>  traZODone 50 milliGRAM(s) Oral at bedtime    MEDICATIONS  (PRN):  acetaminophen     Tablet .. 650 milliGRAM(s) Oral every 6 hours PRN Mild Pain (1 - 3)  lidocaine/prilocaine Cream 1 Application(s) Topical daily PRN pre-HD  oxyCODONE    IR 5 milliGRAM(s) Oral every 4 hours PRN Moderate Pain (4 - 6)  oxyCODONE    IR 10 milliGRAM(s) Oral every 4 hours PRN Severe Pain (7 - 10)  sodium chloride 0.9% lock flush 10 milliLiter(s) IV Push every 1 hour PRN Pre/post blood products, medications, blood draw, and to maintain line patency      LABS:                        8.5    9.68  )-----------( 236      ( 02 Feb 2025 00:21 )             27.8     02-02    129[L]  |  90[L]  |  30[H]  ----------------------------<  107[H]  5.0   |  26  |  4.78[H]    Ca    8.8      02 Feb 2025 00:27  Phos  3.9     02-02  Mg     2.1     02-02    TPro  6.4  /  Alb  2.8[L]  /  TBili  0.5  /  DBili  x   /  AST  20  /  ALT  10  /  AlkPhos  125[H]  02-02      Urinalysis Basic - ( 02 Feb 2025 00:27 )    Color: x / Appearance: x / SG: x / pH: x  Gluc: 107 mg/dL / Ketone: x  / Bili: x / Urobili: x   Blood: x / Protein: x / Nitrite: x   Leuk Esterase: x / RBC: x / WBC x   Sq Epi: x / Non Sq Epi: x / Bacteria: x        RADIOLOGY & ADDITIONAL STUDIES:

## 2025-02-02 NOTE — PROGRESS NOTE ADULT - ASSESSMENT
54M with PMH of HTN, HLD, ESRD on HD MWF via LUE AVF, ascites (requiring repeat paracenteses q4-6wks), NICM with moderate LV dysfunction w/ EF 35-40%(2023) and CAD s/p atherectomy + SALLIE to D1(4/11/2024) presents to [facility] transferred from [facility]. Patient initially presented to [facility] ED with shortness of breath. Surgery consulted for sacral decub and back abscess. Sp successful I&D of back abscess and sacral decub debridement     Plan:   - Follow up cultures  - Status post I&D of back abscess  - Status post bedside debridement of sacral decubitus ulcer   - Wound care contacted, f/u recommendations  - No further surgical interventions indicated, please re-consult as necessary or with change in clinical condition    ACS/Trauma  [id number]

## 2025-02-02 NOTE — PROGRESS NOTE ADULT - SUBJECTIVE AND OBJECTIVE BOX
MR#[id number]  PATIENT NAME:RAAD CANO    DATE OF SERVICE: 25 @ 08:02  Patient was seen and examined by Solo Quick MD on    25 @ 08:02 .  Interim events noted.Consultant notes ,Labs,Telemetry reviewed by me       HOSPITAL COURSE: HPI:  54M with PMH of HTN, HLD, ESRD on HD MWF via LUE AVF, ascites (requiring repeat paracenteses q4-6wks), NICM with moderate LV dysfunction w/ EF 35-40%() and CAD s/p atherectomy + SALLIE to D1(2024) presents to [facility] transferred from [facility]. Patient initially presented to [facility] ED with shortness of breath. Of note he was recently admitted from - for acute cholecystitis s/p cholecystectomy. In [facility] ED, pt was hypoxic to 86% on RA, trop 1.7K, EKG no new changes, CXR fluid overload. Admitted for AHRF 2/2 fluid overload. Pt received HD on , ,  and . DAPT was resumed, TTE showed improvement in LVEF to 40-45%. Stress test was abnormal with 1mm inferior STD, reversible ischemia in the inferior wall and fixed defects in the lateral, anterior and apical walls with post stress EF of 24%. Pt was then transferred to [facility] for cardiac cath which showed pLAD 70% ISR, mLCx 70%, D1 severe dz. Patient now transferred to [facility] CTS Dr. Rivers for CABG eval. Patient denies any current SOB or chest pain on admission. Otherwise denies headaches, abdominal pain, urinary or bowel changes, fevers, chills, N/V/D, sick contacts.  (24 Dec 2024 05:07)      INTERIM EVENTS:Patient seen at bedside ,interim events noted.   Cardiac cath  pLAD 70% ISR, mLCx 70%, D1 severe dz.   -POD#1-C3L free LIMA-LAD; SVG-Diag; SVG-leftPL Awake OOB extubated Sinus rhythm on Dobutamine gtt  - Was intubated last night for airway protection s/p bronchoscopy This morning had IABP inserted  remains sedated intubated Sinus Rhythm  - s/p IABP insertion, sepsis/septic shock due to GNR/ECOLI -Paracentesis for suspected bacterial peritonitis-no growth  -Awake on nocturnal vent support-Sinus rhythm-had been seen by Surgery for sacral wound  -Awake on Vent at nights noted bradycardic put back on  seen by EP  -Awake c/o buttock pain remains on Nocturnal Vent on  and Theophyline   -Awake AF rvr   -Had bronch-s/p I&D of back abscess-s/p bedside debridement of sacral decubitus ulcer           PMH -reviewed admission note, no change since admission      MEDICATIONS  (STANDING):  acetylcysteine 10%  Inhalation 4 milliLiter(s) Inhalation every 6 hours  albuterol    0.083% 2.5 milliGRAM(s) Nebulizer every 6 hours  ascorbic acid 500 milliGRAM(s) Oral daily  aspirin  chewable 81 milliGRAM(s) Oral daily  atorvastatin 80 milliGRAM(s) Oral at bedtime  chlorhexidine 0.12% Liquid 15 milliLiter(s) Oral Mucosa every 12 hours  chlorhexidine 2% Cloths 1 Application(s) Topical daily  chlorhexidine 4% Liquid 1 Application(s) Topical <User Schedule>  dextrose 50% Injectable 50 milliLiter(s) IV Push every 15 minutes  epoetin ignacia (EPOGEN) Injectable 04777 Unit(s) IV Push <User Schedule>  heparin   Injectable 5000 Unit(s) SubCutaneous every 8 hours  insulin lispro (ADMELOG) corrective regimen sliding scale   SubCutaneous every 6 hours  linezolid  IVPB      linezolid  IVPB 600 milliGRAM(s) IV Intermittent every 12 hours  melatonin 5 milliGRAM(s) Oral at bedtime  meropenem  IVPB 500 milliGRAM(s) IV Intermittent every 24 hours  methocarbamol 500 milliGRAM(s) Oral every 8 hours  mirtazapine 7.5 milliGRAM(s) Oral daily  Nephro-elicia 1 Tablet(s) Oral daily  pantoprazole  Injectable 40 milliGRAM(s) IV Push daily  polyethylene glycol 3350 17 Gram(s) Oral two times a day  pregabalin 50 milliGRAM(s) Oral every 8 hours  sodium chloride 0.65% Nasal 1 Spray(s) Both Nostrils every 12 hours  sodium chloride 0.9%. 1000 milliLiter(s) (10 mL/Hr) IV Continuous <Continuous>  traZODone 50 milliGRAM(s) Oral at bedtime    MEDICATIONS  (PRN):  acetaminophen     Tablet .. 650 milliGRAM(s) Oral every 6 hours PRN Mild Pain (1 - 3)  lidocaine/prilocaine Cream 1 Application(s) Topical daily PRN pre-HD  oxyCODONE    IR 5 milliGRAM(s) Oral every 4 hours PRN Moderate Pain (4 - 6)  oxyCODONE    IR 10 milliGRAM(s) Oral every 4 hours PRN Severe Pain (7 - 10)  sodium chloride 0.9% lock flush 10 milliLiter(s) IV Push every 1 hour PRN Pre/post blood products, medications, blood draw, and to maintain line patency            REVIEW OF SYSTEMS:  Constitutional: [ ] fever, [ ]weight loss,  [ ]fatigue [ ]weight gain  Eyes: [ ] visual changes  Respiratory: [ ]shortness of breath;  [ ] cough, [ ]wheezing, [ ]chills, [ ]hemoptysis  Cardiovascular: [ ] chest pain, [ ]palpitations, [ ]dizziness,  [ ]leg swelling[ ]orthopnea[ ]PND  Gastrointestinal: [ ] abdominal pain, [ ]nausea, [ ]vomiting,  [ ]diarrhea [ ]Constipation [ ]Melena  Genitourinary: [ ] dysuria, [ ] hematuria [ ]Vigil  Neurologic: [ ] headaches [ ] tremors[ ]weakness [ ]Paralysis Right[ ] Left[ ]  Skin: [ ] itching, [ ]burning, [ ] rashes  Endocrine: [ ] heat or cold intolerance  Musculoskeletal: [ ] joint pain or swelling; [ ] muscle, back, or extremity pain  Psychiatric: [ ] depression, [ ]anxiety, [ ]mood swings, or [ ]difficulty sleeping  Hematologic: [ ] easy bruising, [ ] bleeding gums    [ ] All remaining systems negative except as per above.   [ ]Unable to obtain.  [x] No change in ROS since admission      Vital Signs Last 24 Hrs  T(C): 37.1 (2025 04:00), Max: 37.3 (2025 12:00)  T(F): 98.8 (2025 04:00), Max: 99.2 (2025 12:00)  HR: 107 (2025 07:00) (75 - 109)  BP: 157/75 (2025 07:00) (105/70 - 179/81)  BP(mean): 108 (2025 07:00) (80 - 115)  RR: 14 (2025 07:00) (14 - 30)  SpO2: 95% (2025 07:00) (89% - 100%)    Parameters below as of 2025 08:00  Patient On (Oxygen Delivery Method): tracheostomy collar    O2 Concentration (%): 40  I&O's Summary    2025 07:01  -  2025 07:00  --------------------------------------------------------  IN: 1400 mL / OUT: 0 mL / NET: 1400 mL        PHYSICAL EXAM:  General: No acute distress BMI-26  HEENT: EOMI, PERRL  Neck: Supple, [ ] JVD  Lungs: Equal air entry bilaterally; [ ] rales [ ] wheezing [ ] rhonchi  Heart: Regular rate and rhythm; [x ] murmur   2/6 [ x] systolic [ ] diastolic [ ] radiation[ ] rubs [ ]  gallops  Abdomen: Nontender, bowel sounds present  Extremities: No clubbing, cyanosis, [ ] edema [ ]Pulses  equal and intact  Nervous system:  Alert & Oriented X3, no focal deficits  Psychiatric: Normal affect  Skin: No rashes or lesions    LABS:      129[L]  |  90[L]  |  30[H]  ----------------------------<  107[H]  5.0   |  26  |  4.78[H]    Ca    8.8      2025 00:27  Phos  3.9       Mg     2.1         TPro  6.4  /  Alb  2.8[L]  /  TBili  0.5  /  DBili  x   /  AST  20  /  ALT  10  /  AlkPhos  125[H]      Creatinine Trend: 4.78<--, 3.78<--, 5.05<--, 3.99<--, 5.51<--, 4.57<--                        8.5    9.68  )-----------( 236      ( 2025 00:21 )             27.8        Xray Chest 1 View- PORTABLE-Urgent (Xray Chest 1 View- PORTABLE-Urgent .) (25 @ 15:19) >  PRIOR STUDIES: 2025    FINDINGS/  IMPRESSION: Cardiomegaly with a globular configuration to the   cardiomediastinal silhouette. This may be due to multichamber enlargement   including the left atrium with splaying of the ema. A pericardial   effusion cannot be excluded. There has been improvement in left-sided   pleural effusion. Echocardiographic correlation is suggested for more   complete evaluation. No pneumothorax. The remainder of the visualized   lung zones are clear. There is again noted a probable feeding tube the   distal tip projecting below the diaphragms and inferior to the   field-of-view.        12 Lead ECG (25 @ 22:10) >  SINUS BRADYCARDIA  LEFT AXIS DEVIATION  INCOMPLETE RIGHT BUNDLE BRANCH BLOCK  ANTEROLATERAL INFARCT (CITED ON OR BEFORE 2025) , AGE UNDETERMINED  POSSIBLE INFERIOR INFARCT , AGE UNDETERMINED  ABNORMAL ECG     TTE W or WO Ultrasound Enhancing Agent (25 @ 12:42) >  CONCLUSIONS:      1. This was a limited study to assess left ventricular systolic function.   2. Left ventricular systolic function is normal with an ejection fraction of 60 % by Michaels's method of disks.   3. Moderately enlarged right ventricular cavity size and mild to moderately reduced right ventricular systolic function.   4. Moderate pericardial effusion noted adjacent to the right atrium.   5. There is no definitive evidence of RA diastolic collapse in the available views. The pericardium proximal to the RV free wall was not well visualized. Due to limited views (as above, and no subcostal views), the study is indeterminate for the presence of echocardiographic signs of tamponade.   6. Left atrium is severely dilated.   7. Compared to the transthoracic echocardiogram performed on 2025, this was also a limited study. The area proximal to the right atrial free wall is better visualized on the present study and the echofree space most consistent with a pericardial effusion is more evident. There is again visual evidence of right ventricular dysfunction.

## 2025-02-02 NOTE — PROGRESS NOTE ADULT - ASSESSMENT
54M with PMH of HTN, HLD, ESRD on HD MWF via LUE AVF, ascites (requiring repeat paracenteses q4-6wks), NICM with moderate LV dysfunction w/ EF 35-40%() and CAD s/p atherectomy + SALLIE to D1(2024) presents to [facility] transferred from [facility]. Patient initially presented to [facility] ED with shortness of breath. Of note he was recently admitted from - for acute cholecystitis s/p cholecystectomy. In [facility] ED, pt was hypoxic to 86% on RA, trop 1.7K, EKG no new changes, CXR fluid overload. Admitted for AHRF 2/2 fluid overload. Pt received HD on , ,  and . DAPT was resumed, TTE showed improvement in LVEF to 40-45%. Stress test was abnormal with 1mm inferior STD, reversible ischemia in the inferior wall and fixed defects in the lateral, anterior and apical walls with post stress EF of 24%.     Pt was then transferred to [facility] for cardiac cath which showed pLAD 70% ISR, mLCx 70%, D1 severe dz. Patient now transferred to [facility] CTS Dr. Rivers for CABG eval.# CAD s/p NSTEMI  Hx CAD s/p PCI LAD   Presented with ADHF elevated troponins  Positive NST1-Cath- pLAD 70% ISR, mLCx 70%, D1 severe dz.   TTE EF 35% Severe TRWas on Plavix-p2y12- 321 been off Plavix since -POD#1-C3L free LIMA-LAD; SVG-Diag; DLC-ltljVQ-Vxhqpxthn on  Sinus rhythm,Amio for AF prophylaxis  -OOB off  Sinus rhythm   -POD#3-On /pressors-EF 25-30% RV failure with transaminitis-Sinus Hvpugz68/03-POD#4-Was intubated for hypoxia-gradual hypotension on /pressors had IABP inserted this morning  -POD#5-s/p IABP insertion, sepsis/septic shock due to GNR/ECOLI HD cath placed   Bronched yesterday 1/3 - minimal secretions with rust-tinged sputum   ESBL-E Coli bacteremia on meropenam    - POD#7 Atrial Fib ,remains intubated weaning IABP 1:3,off pressors on CRRT  -IABP removed.Remains on vent-Alex 10ppm,off Levo- CVP 10 / MAP 71 / Hct 25.6% / Lactate 1.7-Atrial Fibrillation  -Intubated on Alex 10ppm, gtt, BP better-AF~~90's on Amio-had bronch still febrile Tmax 98635/-Extubated awake alert on HFNC AF,on Dobutrex-CRRT,Cultures no growth    01/10-OOB on BiPAP Sinus Rhythm on -SR/ MAP 57/ CVP 10/  Hct 26.7 / Lact 1.4  -s/p EDU/DCCV-Sinus rhythm on -SR / MAP 52 / CVP 12/ Hct 25.8 / Lact 0.7-had bronch with BAL Left lung  -Sinus rhythm on -for repeat Bronch-SR / MAP 67 / CVP 11 / Hct 27.4% / Lact 0.8  -s/p Trach on  TTE EF 55%-SR / CVP 2 / MAP 63 / Hct 25.9% / Lactate 0.9  -Awake on Trach collar off  c/o buttock pain had bronch yesterday-BAL-Sinus rhythm  -Sinus rhythm on vent at night-BP stable may need to increase hydrallazine 25 mg q8  -TTE EF 60% still needs Vent support at night Bradycardic trial of Bryce now back on   -Awake alert Sinus rhythm BP elevated  remains on ,Nocturnal vent support  resume Hydralazine 25mg q8    -Reverted to AF rvr  -s/p BRonc/BAL debridement sacral decub      # Acute on Chronic Systolic Heart Failure now improved  EF-35% ,severe TR  Had HD post op  GDMT post op  LVEF improved post bypass but now at 23-30%-BiV dysfunction on  now off pressors  -TTE EF 40%,trace effusion  ECG c/w pericarditis-was on colchicine   01/15-TTE EF 55%  -TTE EF 60%     Vascular evaluated  AVGraft /?steal causing RV failure-'' Very low suspicion of steal Sd and AVF causing current clinical state. ''   VA Duplex Hemodialysis Access, Left (25 @ 13:35) >   Arterial flow volume of 1301 mL/m, and the venous flow volume of  1492 mL/m are consistent with a normally functioning dialysis access  fistula.      # Ascites  Hx chronic ascites  Has drainage q4-6 weeks  Last drained -4600mL  ? ascites related to severe TR  Had xniqknxfghvl-Wagfwdx-br growth   CT Abdomen 01/10-Large volume ascites-consider paracentesis  Monitor      # ESRD  Now off CRRT transition to HD

## 2025-02-02 NOTE — PROGRESS NOTE ADULT - SUBJECTIVE AND OBJECTIVE BOX
Patient seen and examined at the bedside.    Remained critically ill on continuous ICU monitoring.    HPI:  54M with PMH of HTN, HLD, ESRD on HD MWF via LUE AVF, ascites (requiring repeat paracenteses q4-6wks), NICM with moderate LV dysfunction w/ EF 35-40%(2023) and CAD s/p atherectomy + SALLIE to D1(4/11/2024) presents to [facility] transferred from [facility]. Patient initially presented to [facility] ED with shortness of breath. Of note he was recently admitted from 09/27-12/02 for acute cholecystitis s/p cholecystectomy. In [facility] ED, pt was hypoxic to 86% on RA, trop 1.7K, EKG no new changes, CXR fluid overload. Admitted for AHRF 2/2 fluid overload. Pt received HD on 12/18, 12/19, 12/20 and 12/22. DAPT was resumed, TTE showed improvement in LVEF to 40-45%. Stress test was abnormal with 1mm inferior STD, reversible ischemia in the inferior wall and fixed defects in the lateral, anterior and apical walls with post stress EF of 24%. Pt was then transferred to [facility] for cardiac cath which showed pLAD 70% ISR, mLCx 70%, D1 severe dz. Patient now transferred to [facility] CTS Dr. Rivers for CABG eval. Patient denies any current SOB or chest pain on admission. Otherwise denies headaches, abdominal pain, urinary or bowel changes, fevers, chills, N/V/D, sick contacts.  (24 Dec 2024 05:07)      =================== LABS =========================                        8.5    9.68  )-----------( 236      ( 02 Feb 2025 00:21 )             27.8     02-02    129[L]  |  90[L]  |  30[H]  ----------------------------<  107[H]  5.0   |  26  |  4.78[H]    Ca    8.8      02 Feb 2025 00:27  Phos  3.9     02-02  Mg     2.1     02-02    TPro  6.4  /  Alb  2.8[L]  /  TBili  0.5  /  DBili  x   /  AST  20  /  ALT  10  /  AlkPhos  125[H]  02-02    LIVER FUNCTIONS - ( 02 Feb 2025 00:27 )  Alb: 2.8 g/dL / Pro: 6.4 g/dL / ALK PHOS: 125 U/L / ALT: 10 U/L / AST: 20 U/L / GGT: x               Urinalysis Basic - ( 02 Feb 2025 00:27 )    Color: x / Appearance: x / SG: x / pH: x  Gluc: 107 mg/dL / Ketone: x  / Bili: x / Urobili: x   Blood: x / Protein: x / Nitrite: x   Leuk Esterase: x / RBC: x / WBC x   Sq Epi: x / Non Sq Epi: x / Bacteria: x  ==================================================    OBJECTIVE:  Vital Signs Last 24 Hrs  T(C): 37.1 (02 Feb 2025 04:00), Max: 37.3 (01 Feb 2025 12:00)  T(F): 98.8 (02 Feb 2025 04:00), Max: 99.2 (01 Feb 2025 12:00)  HR: 107 (02 Feb 2025 06:14) (75 - 109)  BP: 146/76 (02 Feb 2025 06:00) (105/70 - 179/81)  BP(mean): 106 (02 Feb 2025 06:00) (80 - 115)  RR: 14 (02 Feb 2025 06:00) (14 - 30)  SpO2: 100% (02 Feb 2025 06:14) (89% - 100%)    Parameters below as of 02 Feb 2025 06:14  Patient On (Oxygen Delivery Method): ventilator      Physical Exam:  General: Alert and interactive,   Neurology: Oriented, following commands. ambulates  Eyes: bilateral pupils equal and reactive   ENT/Neck: Neck supple, trachea midline, No JVD   Respiratory: Clear bilaterally   CV: S1S2, no murmurs        [x] Afib / Sinus tachycardia, [x] Temporary pacing - standby mode  Abdominal: Soft, NT, ND +BS   Extremities: 1-2+ pedal edema noted, + peripheral pulses   Skin: No Rashes, Hematoma, Ecchymosis            Assessment:  53 yo M s/p CABG x 3 on 12/30/24    Postop acute respiratory failure  Acute blood loss anemia  Ascites    ESRD on iHD   E coli bacteremia 2/2 pneumonia  VRE E. Faecium   HSV infection  Tracheomalacia     Plan:   ***Neuro***  [x] Nonfocal  Post operative neuro assessment   Gabapentin, Robaxin, Lido patches & PRNs for pain management  Nightly Trazodone, Remeron, Melatonin, Lyrica  OOBTC and walk as tolerated    ***Cardiovascular***  Invasive hemodynamic monitoring, assess perfusion indices   SR / Hct 27.8% / Lactate 1.0  Continuos reassessment of hemodynamics  [x] VTE ppx with SQ Heparin 5000  [x]  ASA [x] Statin  Intermittent bradycardia/junction with hypotension - continue on Theophyline  Serial EKG and cardiac enzymes     ***Pulmonary***  Bronched yesterday with copious secretions - BAL pending  [x] HFTC - 40L/40%  Titration of FiO2 and PEEP, follow SpO2, CXR, blood gasses   Continue on duonebs    Mode: AC/ CMV (Assist Control/ Continuous Mandatory Ventilation)  RR (machine): 15  FiO2: 40  PEEP: 10  ITime: 1  MAP: 13  PC: 12  PIP: 23            Will plan to wean & extubate once pt is hemodynamically stable and not bleeding      ***GI***  [x] TF's at goal of 65 cc's/hr   [x] Protonix for stress ulcer prophylaxis  Bowel regimen with Miralax   S/p sacral debridement yesterday 2/1    ***Renal***  [x] ESRD on HD - last session 1/31  Continue to monitor I/Os, BUN/Creatinine.   Replete lytes PRN  Nephro-Lisette & EPOGEN for renal support    ***ID***  Sepsis/septic shock due to ESBL E. coli - Meropenem   Zyvox for VRE bacteremia     ***Endocrine***  [x]  DM2 : HbA1c 5.6%                - [x] ISS              - Need tight glycemic control to prevent wound infection.          Patient requires continuous monitoring with bedside rhythm monitoring, pulse oximetry monitoring, and continuous central venous and arterial pressure monitoring; and intermittent blood gas analysis. Care plan discussed with the ICU care team.   Patient remained critical, at risk for life threatening decompensation.    I have spent 30 minutes providing critical care management to this patient.    By signing my name below, I, Gustavo Hudson, attest that this documentation has been prepared under the direction and in the presence of Shar Park NP   Electronically signed: Evelio Reyes, 02-02-25 @ 06:58    I, Shar Park, personally performed the services described in this documentation. all medical record entries made by the shubhamibdarlene were at my direction and in my presence. I have reviewed the chart and agree that the record reflects my personal performance and is accurate and complete  Electronically signed: Shar Park NP  Patient seen and examined at the bedside.    Remained critically ill on continuous ICU monitoring.    HPI:  54M with PMH of HTN, HLD, ESRD on HD MWF via LUE AVF, ascites (requiring repeat paracenteses q4-6wks), NICM with moderate LV dysfunction w/ EF 35-40%(2023) and CAD s/p atherectomy + SALLIE to D1(4/11/2024) presents to [facility] transferred from [facility]. Patient initially presented to [facility] ED with shortness of breath. Of note he was recently admitted from 09/27-12/02 for acute cholecystitis s/p cholecystectomy. In [facility] ED, pt was hypoxic to 86% on RA, trop 1.7K, EKG no new changes, CXR fluid overload. Admitted for AHRF 2/2 fluid overload. Pt received HD on 12/18, 12/19, 12/20 and 12/22. DAPT was resumed, TTE showed improvement in LVEF to 40-45%. Stress test was abnormal with 1mm inferior STD, reversible ischemia in the inferior wall and fixed defects in the lateral, anterior and apical walls with post stress EF of 24%. Pt was then transferred to [facility] for cardiac cath which showed pLAD 70% ISR, mLCx 70%, D1 severe dz. Patient now transferred to [facility] CTS Dr. Rivers for CABG eval. Patient denies any current SOB or chest pain on admission. Otherwise denies headaches, abdominal pain, urinary or bowel changes, fevers, chills, N/V/D, sick contacts.  (24 Dec 2024 05:07)      =================== LABS =========================                        8.5    9.68  )-----------( 236      ( 02 Feb 2025 00:21 )             27.8     02-02    129[L]  |  90[L]  |  30[H]  ----------------------------<  107[H]  5.0   |  26  |  4.78[H]    Ca    8.8      02 Feb 2025 00:27  Phos  3.9     02-02  Mg     2.1     02-02    TPro  6.4  /  Alb  2.8[L]  /  TBili  0.5  /  DBili  x   /  AST  20  /  ALT  10  /  AlkPhos  125[H]  02-02    LIVER FUNCTIONS - ( 02 Feb 2025 00:27 )  Alb: 2.8 g/dL / Pro: 6.4 g/dL / ALK PHOS: 125 U/L / ALT: 10 U/L / AST: 20 U/L / GGT: x               Urinalysis Basic - ( 02 Feb 2025 00:27 )    Color: x / Appearance: x / SG: x / pH: x  Gluc: 107 mg/dL / Ketone: x  / Bili: x / Urobili: x   Blood: x / Protein: x / Nitrite: x   Leuk Esterase: x / RBC: x / WBC x   Sq Epi: x / Non Sq Epi: x / Bacteria: x  ==================================================    OBJECTIVE:  Vital Signs Last 24 Hrs  T(C): 37.1 (02 Feb 2025 04:00), Max: 37.3 (01 Feb 2025 12:00)  T(F): 98.8 (02 Feb 2025 04:00), Max: 99.2 (01 Feb 2025 12:00)  HR: 107 (02 Feb 2025 06:14) (75 - 109)  BP: 146/76 (02 Feb 2025 06:00) (105/70 - 179/81)  BP(mean): 106 (02 Feb 2025 06:00) (80 - 115)  RR: 14 (02 Feb 2025 06:00) (14 - 30)  SpO2: 100% (02 Feb 2025 06:14) (89% - 100%)    Parameters below as of 02 Feb 2025 06:14  Patient On (Oxygen Delivery Method): ventilator      Physical Exam:  General: Alert and interactive,   Neurology: Oriented, following commands. ambulates  Eyes: bilateral pupils equal and reactive   ENT/Neck: Neck supple, trachea midline, No JVD   Respiratory: Clear bilaterally   CV: S1S2, no murmurs        [x] Afib / Sinus tachycardia, [x] Temporary pacing - standby mode  Abdominal: Soft, NT, ND +BS   Extremities: 1-2+ pedal edema noted, + peripheral pulses   Skin: No Rashes, Hematoma, Ecchymosis            Assessment:  55 yo M s/p CABG x 3 on 12/30/24    Postop acute respiratory failure  Acute blood loss anemia  Ascites    ESRD on iHD   E coli bacteremia 2/2 pneumonia  VRE E. Faecium   HSV infection  Tracheomalacia     Plan:   ***Neuro***  [x] Nonfocal  Post operative neuro assessment   Gabapentin, Robaxin, Lido patches & PRNs for pain management  Nightly Trazodone, Remeron, Melatonin, Lyrica  OOBTC and walk as tolerated    ***Cardiovascular***  Invasive hemodynamic monitoring, assess perfusion indices   SR / Hct 27.8% / Lactate 1.0  Continuos reassessment of hemodynamics  [x] VTE ppx with SQ Heparin 5000  [x]  ASA [x] Statin  Intermittent bradycardia/junction with hypotension - continue on Theophyline  Serial EKG and cardiac enzymes     ***Pulmonary***  Bronched yesterday with copious secretions - BAL pending  [x] HFTC - 40L/40%  Titration of FiO2 and PEEP, follow SpO2, CXR, blood gasses   Continue on duonebs    Mode: AC/ CMV (Assist Control/ Continuous Mandatory Ventilation)  RR (machine): 15  FiO2: 40  PEEP: 10  ITime: 1  MAP: 13  PC: 12  PIP: 23     CPAP PSV 10/peep 5       Will plan to wean & extubate once pt is hemodynamically stable and not bleeding      ***GI***  [x] TF's vital at goal of 65 cc's/hr   [x] Protonix for stress ulcer prophylaxis  Bowel regimen with Miralax  S/p sacral debridement yesterday 2/1    ***Renal***  [x] ESRD on HD - last session 1/31  Continue to monitor I/Os, BUN/Creatinine.   Replete lytes PRN  Nephro-Lisette & EPOGEN for renal support    ***ID***  Sepsis/septic shock due to ESBL E. coli - Meropenem   Zyvox for VRE bacteremia     ***Endocrine***  [x]  DM2 : HbA1c 5.6%                - [x] ISS              - Need tight glycemic control to prevent wound infection.          Patient requires continuous monitoring with bedside rhythm monitoring, pulse oximetry monitoring, and continuous central venous and arterial pressure monitoring; and intermittent blood gas analysis. Care plan discussed with the ICU care team.   Patient remained critical, at risk for life threatening decompensation.    I have spent 30 minutes providing critical care management to this patient.    By signing my name below, I, Gustavo Hudson, attest that this documentation has been prepared under the direction and in the presence of Shar Park NP   Electronically signed: Evelio Reyes, 02-02-25 @ 06:58    I, Shar Park, personally performed the services described in this documentation. all medical record entries made by the shubhamibdarlene were at my direction and in my presence. I have reviewed the chart and agree that the record reflects my personal performance and is accurate and complete  Electronically signed: Shar Park NP

## 2025-02-03 ENCOUNTER — RESULT REVIEW (OUTPATIENT)
Age: 55
End: 2025-02-03

## 2025-02-03 LAB
ALBUMIN SERPL ELPH-MCNC: 3 G/DL — LOW (ref 3.3–5)
ALP SERPL-CCNC: 122 U/L — HIGH (ref 40–120)
ALT FLD-CCNC: 10 U/L — SIGNIFICANT CHANGE UP (ref 10–45)
ANION GAP SERPL CALC-SCNC: 13 MMOL/L — SIGNIFICANT CHANGE UP (ref 5–17)
ANION GAP SERPL CALC-SCNC: 13 MMOL/L — SIGNIFICANT CHANGE UP (ref 5–17)
APTT BLD: 37.5 SEC — HIGH (ref 24.5–35.6)
AST SERPL-CCNC: 16 U/L — SIGNIFICANT CHANGE UP (ref 10–40)
BASOPHILS # BLD AUTO: 0.04 K/UL — SIGNIFICANT CHANGE UP (ref 0–0.2)
BASOPHILS NFR BLD AUTO: 0.4 % — SIGNIFICANT CHANGE UP (ref 0–2)
BILIRUB SERPL-MCNC: 0.4 MG/DL — SIGNIFICANT CHANGE UP (ref 0.2–1.2)
BLD GP AB SCN SERPL QL: NEGATIVE — SIGNIFICANT CHANGE UP
BUN SERPL-MCNC: 35 MG/DL — HIGH (ref 7–23)
BUN SERPL-MCNC: 40 MG/DL — HIGH (ref 7–23)
CALCIUM SERPL-MCNC: 9.1 MG/DL — SIGNIFICANT CHANGE UP (ref 8.4–10.5)
CHLORIDE SERPL-SCNC: 87 MMOL/L — LOW (ref 96–108)
CHLORIDE SERPL-SCNC: 89 MMOL/L — LOW (ref 96–108)
CO2 SERPL-SCNC: 24 MMOL/L — SIGNIFICANT CHANGE UP (ref 22–31)
CREAT SERPL-MCNC: 5.38 MG/DL — HIGH (ref 0.5–1.3)
CREAT SERPL-MCNC: 5.74 MG/DL — HIGH (ref 0.5–1.3)
EGFR: 11 ML/MIN/1.73M2 — LOW
EGFR: 11 ML/MIN/1.73M2 — LOW
EGFR: 12 ML/MIN/1.73M2 — LOW
EGFR: 12 ML/MIN/1.73M2 — LOW
EOSINOPHIL # BLD AUTO: 0.64 K/UL — HIGH (ref 0–0.5)
EOSINOPHIL NFR BLD AUTO: 6.4 % — HIGH (ref 0–6)
FIBRINOGEN PPP-MCNC: 314 MG/DL — SIGNIFICANT CHANGE UP (ref 200–445)
GLUCOSE SERPL-MCNC: 157 MG/DL — HIGH (ref 70–99)
GLUCOSE SERPL-MCNC: 96 MG/DL — SIGNIFICANT CHANGE UP (ref 70–99)
HCT VFR BLD CALC: 27.5 % — LOW (ref 39–50)
HGB BLD-MCNC: 8.6 G/DL — LOW (ref 13–17)
IMM GRANULOCYTES NFR BLD AUTO: 0.5 % — SIGNIFICANT CHANGE UP (ref 0–0.9)
INR BLD: 1.18 RATIO — HIGH (ref 0.85–1.16)
LYMPHOCYTES # BLD AUTO: 0.5 K/UL — LOW (ref 1–3.3)
LYMPHOCYTES # BLD AUTO: 5 % — LOW (ref 13–44)
MAGNESIUM SERPL-MCNC: 2.1 MG/DL — SIGNIFICANT CHANGE UP (ref 1.6–2.6)
MCHC RBC-ENTMCNC: 30 PG — SIGNIFICANT CHANGE UP (ref 27–34)
MCHC RBC-ENTMCNC: 31.3 G/DL — LOW (ref 32–36)
MCV RBC AUTO: 95.8 FL — SIGNIFICANT CHANGE UP (ref 80–100)
MONOCYTES # BLD AUTO: 1.05 K/UL — HIGH (ref 0–0.9)
MONOCYTES NFR BLD AUTO: 10.5 % — SIGNIFICANT CHANGE UP (ref 2–14)
NEUTROPHILS # BLD AUTO: 7.74 K/UL — HIGH (ref 1.8–7.4)
NEUTROPHILS NFR BLD AUTO: 77.2 % — HIGH (ref 43–77)
NRBC # BLD: 0 /100 WBCS — SIGNIFICANT CHANGE UP (ref 0–0)
NRBC BLD-RTO: 0 /100 WBCS — SIGNIFICANT CHANGE UP (ref 0–0)
PHOSPHATE SERPL-MCNC: 4.7 MG/DL — HIGH (ref 2.5–4.5)
PLATELET # BLD AUTO: 244 K/UL — SIGNIFICANT CHANGE UP (ref 150–400)
POTASSIUM SERPL-MCNC: 5.5 MMOL/L — HIGH (ref 3.5–5.3)
POTASSIUM SERPL-MCNC: 5.6 MMOL/L — HIGH (ref 3.5–5.3)
POTASSIUM SERPL-MCNC: 5.9 MMOL/L — HIGH (ref 3.5–5.3)
POTASSIUM SERPL-SCNC: 5.2 MMOL/L — SIGNIFICANT CHANGE UP (ref 3.5–5.3)
POTASSIUM SERPL-SCNC: 5.5 MMOL/L — HIGH (ref 3.5–5.3)
POTASSIUM SERPL-SCNC: 5.6 MMOL/L — HIGH (ref 3.5–5.3)
POTASSIUM SERPL-SCNC: 5.9 MMOL/L — HIGH (ref 3.5–5.3)
PROT SERPL-MCNC: 6.7 G/DL — SIGNIFICANT CHANGE UP (ref 6–8.3)
PROTHROM AB SERPL-ACNC: 13.4 SEC — SIGNIFICANT CHANGE UP (ref 9.9–13.4)
RBC # BLD: 2.87 M/UL — LOW (ref 4.2–5.8)
RBC # FLD: 19.9 % — HIGH (ref 10.3–14.5)
RH IG SCN BLD-IMP: POSITIVE — SIGNIFICANT CHANGE UP
SODIUM SERPL-SCNC: 126 MMOL/L — LOW (ref 135–145)
SODIUM SERPL-SCNC: 126 MMOL/L — LOW (ref 135–145)
WBC # BLD: 10.02 K/UL — SIGNIFICANT CHANGE UP (ref 3.8–10.5)
WBC # FLD AUTO: 10.02 K/UL — SIGNIFICANT CHANGE UP (ref 3.8–10.5)

## 2025-02-03 PROCEDURE — G0545: CPT

## 2025-02-03 PROCEDURE — 99291 CRITICAL CARE FIRST HOUR: CPT

## 2025-02-03 PROCEDURE — 71045 X-RAY EXAM CHEST 1 VIEW: CPT | Mod: 26

## 2025-02-03 PROCEDURE — 99232 SBSQ HOSP IP/OBS MODERATE 35: CPT

## 2025-02-03 PROCEDURE — 93308 TTE F-UP OR LMTD: CPT | Mod: 26

## 2025-02-03 PROCEDURE — 76705 ECHO EXAM OF ABDOMEN: CPT | Mod: 26

## 2025-02-03 RX ORDER — CALCIUM GLUCONATE 20 MG/ML
2 INJECTION, SOLUTION INTRAVENOUS ONCE
Refills: 0 | Status: COMPLETED | OUTPATIENT
Start: 2025-02-03 | End: 2025-02-03

## 2025-02-03 RX ORDER — MAGNESIUM SULFATE 500 MG/ML
2 SYRINGE (ML) INJECTION ONCE
Refills: 0 | Status: COMPLETED | OUTPATIENT
Start: 2025-02-03 | End: 2025-02-03

## 2025-02-03 RX ORDER — PREGABALIN 50 MG/1
50 CAPSULE ORAL EVERY 12 HOURS
Refills: 0 | Status: DISCONTINUED | OUTPATIENT
Start: 2025-02-03 | End: 2025-02-04

## 2025-02-03 RX ORDER — DEXTROSE 50 % IN WATER 50 %
25 SYRINGE (ML) INTRAVENOUS ONCE
Refills: 0 | Status: COMPLETED | OUTPATIENT
Start: 2025-02-03 | End: 2025-02-03

## 2025-02-03 RX ORDER — DEXTROSE 50 % IN WATER 50 %
50 SYRINGE (ML) INTRAVENOUS ONCE
Refills: 0 | Status: COMPLETED | OUTPATIENT
Start: 2025-02-03 | End: 2025-02-03

## 2025-02-03 RX ADMIN — Medication 2.5 MILLIGRAM(S): at 17:49

## 2025-02-03 RX ADMIN — INSULIN LISPRO 8: 100 INJECTION, SOLUTION INTRAVENOUS; SUBCUTANEOUS at 05:21

## 2025-02-03 RX ADMIN — Medication 1 APPLICATION(S): at 05:00

## 2025-02-03 RX ADMIN — Medication 1 SPRAY(S): at 05:05

## 2025-02-03 RX ADMIN — Medication 1 TABLET(S): at 13:08

## 2025-02-03 RX ADMIN — Medication 5 UNIT(S): at 01:07

## 2025-02-03 RX ADMIN — OXYCODONE HYDROCHLORIDE 5 MILLIGRAM(S): 30 TABLET ORAL at 13:52

## 2025-02-03 RX ADMIN — Medication 50 MILLILITER(S): at 03:49

## 2025-02-03 RX ADMIN — Medication 10 MILLILITER(S): at 21:02

## 2025-02-03 RX ADMIN — Medication 2.5 MILLIGRAM(S): at 23:19

## 2025-02-03 RX ADMIN — ACETYLCYSTEINE 4 MILLILITER(S): 200 INHALANT RESPIRATORY (INHALATION) at 05:16

## 2025-02-03 RX ADMIN — Medication 25 GRAM(S): at 18:30

## 2025-02-03 RX ADMIN — LIDOCAINE AND PRILOCAINE 1 APPLICATION(S): 25; 25 CREAM TOPICAL at 11:59

## 2025-02-03 RX ADMIN — METHOCARBAMOL 500 MILLIGRAM(S): 500 TABLET, FILM COATED ORAL at 21:02

## 2025-02-03 RX ADMIN — Medication 25 MILLILITER(S): at 03:49

## 2025-02-03 RX ADMIN — OXYCODONE HYDROCHLORIDE 10 MILLIGRAM(S): 30 TABLET ORAL at 05:00

## 2025-02-03 RX ADMIN — Medication 650 MILLIGRAM(S): at 13:52

## 2025-02-03 RX ADMIN — Medication 10 UNIT(S): at 03:48

## 2025-02-03 RX ADMIN — EPOETIN ALFA 10000 UNIT(S): 10000 SOLUTION INTRAVENOUS; SUBCUTANEOUS at 13:36

## 2025-02-03 RX ADMIN — LINEZOLID 300 MILLIGRAM(S): 2 INJECTION, SOLUTION INTRAVENOUS at 00:16

## 2025-02-03 RX ADMIN — HEPARIN SODIUM 5000 UNIT(S): 1000 INJECTION INTRAVENOUS; SUBCUTANEOUS at 05:05

## 2025-02-03 RX ADMIN — Medication 10 UNIT(S): at 12:22

## 2025-02-03 RX ADMIN — Medication 650 MILLIGRAM(S): at 13:22

## 2025-02-03 RX ADMIN — Medication 81 MILLIGRAM(S): at 13:12

## 2025-02-03 RX ADMIN — Medication 2.5 MILLIGRAM(S): at 11:29

## 2025-02-03 RX ADMIN — ACETYLCYSTEINE 4 MILLILITER(S): 200 INHALANT RESPIRATORY (INHALATION) at 23:18

## 2025-02-03 RX ADMIN — METHOCARBAMOL 500 MILLIGRAM(S): 500 TABLET, FILM COATED ORAL at 13:09

## 2025-02-03 RX ADMIN — Medication 50 MILLILITER(S): at 01:06

## 2025-02-03 RX ADMIN — Medication 50 MILLILITER(S): at 12:23

## 2025-02-03 RX ADMIN — HEPARIN SODIUM 5000 UNIT(S): 1000 INJECTION INTRAVENOUS; SUBCUTANEOUS at 13:09

## 2025-02-03 RX ADMIN — HEPARIN SODIUM 5000 UNIT(S): 1000 INJECTION INTRAVENOUS; SUBCUTANEOUS at 21:03

## 2025-02-03 RX ADMIN — CALCIUM GLUCONATE 200 GRAM(S): 20 INJECTION, SOLUTION INTRAVENOUS at 03:49

## 2025-02-03 RX ADMIN — Medication 5 MILLIGRAM(S): at 21:03

## 2025-02-03 RX ADMIN — LINEZOLID 300 MILLIGRAM(S): 2 INJECTION, SOLUTION INTRAVENOUS at 13:08

## 2025-02-03 RX ADMIN — ACETYLCYSTEINE 4 MILLILITER(S): 200 INHALANT RESPIRATORY (INHALATION) at 11:28

## 2025-02-03 RX ADMIN — MIRTAZAPINE 7.5 MILLIGRAM(S): 30 TABLET, FILM COATED ORAL at 13:09

## 2025-02-03 RX ADMIN — OXYCODONE HYDROCHLORIDE 5 MILLIGRAM(S): 30 TABLET ORAL at 13:22

## 2025-02-03 RX ADMIN — MEROPENEM 100 MILLIGRAM(S): 1 INJECTION INTRAVENOUS at 05:05

## 2025-02-03 RX ADMIN — Medication 50 MILLIGRAM(S): at 21:03

## 2025-02-03 RX ADMIN — ACETYLCYSTEINE 4 MILLILITER(S): 200 INHALANT RESPIRATORY (INHALATION) at 17:49

## 2025-02-03 RX ADMIN — Medication 40 MILLIGRAM(S): at 13:08

## 2025-02-03 RX ADMIN — Medication 500 MILLIGRAM(S): at 13:08

## 2025-02-03 RX ADMIN — OXYCODONE HYDROCHLORIDE 10 MILLIGRAM(S): 30 TABLET ORAL at 05:30

## 2025-02-03 RX ADMIN — Medication 15 MILLILITER(S): at 05:05

## 2025-02-03 RX ADMIN — ATORVASTATIN CALCIUM 80 MILLIGRAM(S): 80 TABLET, FILM COATED ORAL at 21:03

## 2025-02-03 RX ADMIN — Medication 2.5 MILLIGRAM(S): at 05:15

## 2025-02-03 RX ADMIN — Medication 25 MILLILITER(S): at 17:20

## 2025-02-03 RX ADMIN — METHOCARBAMOL 500 MILLIGRAM(S): 500 TABLET, FILM COATED ORAL at 05:04

## 2025-02-03 NOTE — PROVIDER CONTACT NOTE (CHANGE IN STATUS NOTIFICATION) - NAME OF MD/NP/PA/DO NOTIFIED:
MYRA Rico & MD Hodges
JAX Park
MD, Orestes; Tracie NICHOLS
JAX Park
MYRA Lilly
Raji Saucedo RN
MD, Evelyn
MD, Tracie

## 2025-02-03 NOTE — PROGRESS NOTE ADULT - SUBJECTIVE AND OBJECTIVE BOX
Patient is a 54y old  Male who presents with a chief complaint of CAD (02 Feb 2025 08:01)    Being followed by ID for        Interval history:  No other acute events      ROS:  No cough,SOB,CP  No N/V/D  No abd pain  No urinary complaints  No HA  No joint or limb pain  No other complaints    PAST MEDICAL & SURGICAL HISTORY:  Type 2 diabetes mellitus      Hypertension      End stage renal disease  started HD 2/2019 T, Th, Sat via right chest permacath      Bone spur  right shoulder- hx of - sx done      Anemia      Injury of right wrist  hx of at age 14      History of hyperkalemia  before HD- K-6.2 - to repeat in am of sx, pt had HD today      S/P arthroscopy of right shoulder  2010      History of vascular access device  right chest permacath - 2/2019      H/O right wrist surgery  tendons repair - at age 14      AV fistula      H/O ventral hernia repair      S/P cholecystectomy        Allergies    No Known Allergies    Intolerances      Antimicrobials:    linezolid  IVPB      linezolid  IVPB 600 milliGRAM(s) IV Intermittent every 12 hours  meropenem  IVPB 500 milliGRAM(s) IV Intermittent every 24 hours    MEDICATIONS  (STANDING):  acetylcysteine 10%  Inhalation 4 milliLiter(s) Inhalation every 6 hours  albuterol    0.083% 2.5 milliGRAM(s) Nebulizer every 6 hours  ascorbic acid 500 milliGRAM(s) Oral daily  aspirin  chewable 81 milliGRAM(s) Oral daily  atorvastatin 80 milliGRAM(s) Oral at bedtime  chlorhexidine 0.12% Liquid 15 milliLiter(s) Oral Mucosa every 12 hours  chlorhexidine 2% Cloths 1 Application(s) Topical daily  chlorhexidine 4% Liquid 1 Application(s) Topical <User Schedule>  dextrose 50% Injectable 50 milliLiter(s) IV Push every 15 minutes  epoetin ignacia (EPOGEN) Injectable 16154 Unit(s) IV Push <User Schedule>  heparin   Injectable 5000 Unit(s) SubCutaneous every 8 hours  insulin lispro (ADMELOG) corrective regimen sliding scale   SubCutaneous every 6 hours  linezolid  IVPB      linezolid  IVPB 600 milliGRAM(s) IV Intermittent every 12 hours  melatonin 5 milliGRAM(s) Oral at bedtime  meropenem  IVPB 500 milliGRAM(s) IV Intermittent every 24 hours  methocarbamol 500 milliGRAM(s) Oral every 8 hours  mirtazapine 7.5 milliGRAM(s) Oral daily  Nephro-elicia 1 Tablet(s) Oral daily  pantoprazole  Injectable 40 milliGRAM(s) IV Push daily  polyethylene glycol 3350 17 Gram(s) Oral two times a day  pregabalin 50 milliGRAM(s) Oral every 12 hours  sodium chloride 0.65% Nasal 1 Spray(s) Both Nostrils every 12 hours  sodium chloride 0.9%. 1000 milliLiter(s) (10 mL/Hr) IV Continuous <Continuous>  traZODone 50 milliGRAM(s) Oral at bedtime      Vital Signs Last 24 Hrs  T(C): 36.9 (02-03-25 @ 08:00), Max: 37.3 (02-02-25 @ 20:00)  T(F): 98.4 (02-03-25 @ 08:00), Max: 99.2 (02-02-25 @ 20:00)  HR: 107 (02-03-25 @ 11:00) (91 - 114)  BP: 130/80 (02-03-25 @ 10:00) (118/76 - 168/85)  BP(mean): 99 (02-03-25 @ 10:00) (90 - 126)  RR: 14 (02-03-25 @ 11:00) (10 - 28)  SpO2: 100% (02-03-25 @ 11:00) (82% - 100%)    Physical Exam:    Constitutional well preserved,comfortable,pleasant    HEENT PERRLA EOMI,No pallor or icterus    No oral exudate or erythema    Neck supple no JVD or LN    Chest Good AE,CTA    CVS RRR S1 S2 WNl No murmur or rub or gallop    Abd soft BS normal No tenderness no masses    Ext No cyanosis clubbing or edema    IV site no erythema tenderness or discharge    Joints no swelling or LOM    CNS AAO X 3 no focal    Lab Data:                          8.6    10.02 )-----------( 244      ( 03 Feb 2025 00:20 )             27.5       02-03    x   |  x   |  x   ----------------------------<  x   5.2   |  x   |  x     Ca    9.0      03 Feb 2025 00:20  Phos  4.7     02-03  Mg     2.1     02-03    TPro  6.7  /  Alb  3.0[L]  /  TBili  0.4  /  DBili  x   /  AST  16  /  ALT  10  /  AlkPhos  122[H]  02-03      Urinalysis Basic - ( 03 Feb 2025 00:20 )    Color: x / Appearance: x / SG: x / pH: x  Gluc: 157 mg/dL / Ketone: x  / Bili: x / Urobili: x   Blood: x / Protein: x / Nitrite: x   Leuk Esterase: x / RBC: x / WBC x   Sq Epi: x / Non Sq Epi: x / Bacteria: x        Bronchial  01-30-25   Commensal manjit consistent with body site  --    Few polymorphonuclear leukocytes seen per low power field  Few Squamous epithelial cells seen per low power field  Few Yeast like cells seen per oil power field      Bronchial  01-28-25   Commensal manjit consistent with body site  --    Moderate polymorphonuclear leukocytes per low power field  Numerous Squamous epithelial cells per low power field  Few Yeast like cells per oil power field      .Blood BLOOD  01-27-25   No growth at 5 days  --  --                    WBC Count: 10.02 (02-03-25 @ 00:20)  WBC Count: 9.68 (02-02-25 @ 00:21)  WBC Count: 11.02 (02-01-25 @ 00:42)  WBC Count: 9.40 (01-31-25 @ 00:30)  WBC Count: 8.96 (01-30-25 @ 00:24)  WBC Count: 7.37 (01-29-25 @ 00:58)  WBC Count: 8.40 (01-28-25 @ 00:24)       Bilirubin Total: 0.4 mg/dL (02-03-25 @ 00:20)  Aspartate Aminotransferase (AST/SGOT): 16 U/L (02-03-25 @ 00:20)  Alanine Aminotransferase (ALT/SGPT): 10 U/L (02-03-25 @ 00:20)  Alkaline Phosphatase: 122 U/L (02-03-25 @ 00:20)  Bilirubin Total: 0.5 mg/dL (02-02-25 @ 00:27)  Aspartate Aminotransferase (AST/SGOT): 20 U/L (02-02-25 @ 00:27)  Alanine Aminotransferase (ALT/SGPT): 10 U/L (02-02-25 @ 00:27)  Alkaline Phosphatase: 125 U/L (02-02-25 @ 00:27)  Bilirubin Total: 0.5 mg/dL (02-01-25 @ 00:42)  Aspartate Aminotransferase (AST/SGOT): 14 U/L (02-01-25 @ 00:42)  Alanine Aminotransferase (ALT/SGPT): 11 U/L (02-01-25 @ 00:42)  Alkaline Phosphatase: 121 U/L (02-01-25 @ 00:42)  Bilirubin Total: 0.5 mg/dL (01-31-25 @ 00:30)  Aspartate Aminotransferase (AST/SGOT): 16 U/L (01-31-25 @ 00:30)  Alanine Aminotransferase (ALT/SGPT): 10 U/L (01-31-25 @ 00:30)  Alkaline Phosphatase: 123 U/L (01-31-25 @ 00:30)  Bilirubin Total: 0.5 mg/dL (01-30-25 @ 00:24)  Aspartate Aminotransferase (AST/SGOT): 21 U/L (01-30-25 @ 00:24)  Alanine Aminotransferase (ALT/SGPT): 14 U/L (01-30-25 @ 00:24)  Alkaline Phosphatase: 129 U/L (01-30-25 @ 00:24)         Patient is a 54y old  Male who presents with a chief complaint of CAD (02 Feb 2025 08:01)    Being followed by ID for        Interval history:  pt feels more shakey today-  No other acute events      ROS:  No cough,SOB,CP  No N/V/D  No abd pain  No urinary complaints  No HA  No joint or limb pain  No other complaints    PAST MEDICAL & SURGICAL HISTORY:  Type 2 diabetes mellitus      Hypertension      End stage renal disease  started HD 2/2019 T, Th, Sat via right chest permacath      Bone spur  right shoulder- hx of - sx done      Anemia      Injury of right wrist  hx of at age 14      History of hyperkalemia  before HD- K-6.2 - to repeat in am of sx, pt had HD today      S/P arthroscopy of right shoulder  2010      History of vascular access device  right chest permacath - 2/2019      H/O right wrist surgery  tendons repair - at age 14      AV fistula      H/O ventral hernia repair      S/P cholecystectomy        Allergies    No Known Allergies    Intolerances      Antimicrobials:    linezolid  IVPB      linezolid  IVPB 600 milliGRAM(s) IV Intermittent every 12 hours  meropenem  IVPB 500 milliGRAM(s) IV Intermittent every 24 hours    MEDICATIONS  (STANDING):  acetylcysteine 10%  Inhalation 4 milliLiter(s) Inhalation every 6 hours  albuterol    0.083% 2.5 milliGRAM(s) Nebulizer every 6 hours  ascorbic acid 500 milliGRAM(s) Oral daily  aspirin  chewable 81 milliGRAM(s) Oral daily  atorvastatin 80 milliGRAM(s) Oral at bedtime  chlorhexidine 0.12% Liquid 15 milliLiter(s) Oral Mucosa every 12 hours  chlorhexidine 2% Cloths 1 Application(s) Topical daily  chlorhexidine 4% Liquid 1 Application(s) Topical <User Schedule>  dextrose 50% Injectable 50 milliLiter(s) IV Push every 15 minutes  epoetin ignacia (EPOGEN) Injectable 30921 Unit(s) IV Push <User Schedule>  heparin   Injectable 5000 Unit(s) SubCutaneous every 8 hours  insulin lispro (ADMELOG) corrective regimen sliding scale   SubCutaneous every 6 hours  linezolid  IVPB      linezolid  IVPB 600 milliGRAM(s) IV Intermittent every 12 hours  melatonin 5 milliGRAM(s) Oral at bedtime  meropenem  IVPB 500 milliGRAM(s) IV Intermittent every 24 hours  methocarbamol 500 milliGRAM(s) Oral every 8 hours  mirtazapine 7.5 milliGRAM(s) Oral daily  Nephro-elicia 1 Tablet(s) Oral daily  pantoprazole  Injectable 40 milliGRAM(s) IV Push daily  polyethylene glycol 3350 17 Gram(s) Oral two times a day  pregabalin 50 milliGRAM(s) Oral every 12 hours  sodium chloride 0.65% Nasal 1 Spray(s) Both Nostrils every 12 hours  sodium chloride 0.9%. 1000 milliLiter(s) (10 mL/Hr) IV Continuous <Continuous>  traZODone 50 milliGRAM(s) Oral at bedtime      Vital Signs Last 24 Hrs  T(C): 36.9 (02-03-25 @ 08:00), Max: 37.3 (02-02-25 @ 20:00)  T(F): 98.4 (02-03-25 @ 08:00), Max: 99.2 (02-02-25 @ 20:00)  HR: 107 (02-03-25 @ 11:00) (91 - 114)  BP: 130/80 (02-03-25 @ 10:00) (118/76 - 168/85)  BP(mean): 99 (02-03-25 @ 10:00) (90 - 126)  RR: 14 (02-03-25 @ 11:00) (10 - 28)  SpO2: 100% (02-03-25 @ 11:00) (82% - 100%)    Physical Exam:    Constitutional  alert  HEENT PERRLA EOMI,No pallor or icterus    No oral exudate or erythema    Neck supple no JVD or LN    Chest Good AE,CTA    CVS  S1 S2    Abd soft BS normal No tenderness    Ext No cyanosis clubbing or edema    IV site no erythema tenderness or discharge    Joints no swelling or LOM    CNS AAO X 3 no focal    Lab Data:                          8.6    10.02 )-----------( 244      ( 03 Feb 2025 00:20 )             27.5       02-03    x   |  x   |  x   ----------------------------<  x   5.2   |  x   |  x     Ca    9.0      03 Feb 2025 00:20  Phos  4.7     02-03  Mg     2.1     02-03    TPro  6.7  /  Alb  3.0[L]  /  TBili  0.4  /  DBili  x   /  AST  16  /  ALT  10  /  AlkPhos  122[H]  02-03            Bronchial  01-30-25   Commensal manjit consistent with body site  --    Few polymorphonuclear leukocytes seen per low power field  Few Squamous epithelial cells seen per low power field  Few Yeast like cells seen per oil power field      Bronchial  01-28-25   Commensal manjit consistent with body site  --    Moderate polymorphonuclear leukocytes per low power field  Numerous Squamous epithelial cells per low power field  Few Yeast like cells per oil power field      .Blood BLOOD  01-27-25   No growth at 5 days  --  --      WBC Count: 10.02 (02-03-25 @ 00:20)  WBC Count: 9.68 (02-02-25 @ 00:21)  WBC Count: 11.02 (02-01-25 @ 00:42)  WBC Count: 9.40 (01-31-25 @ 00:30)  WBC Count: 8.96 (01-30-25 @ 00:24)  WBC Count: 7.37 (01-29-25 @ 00:58)  WBC Count: 8.40 (01-28-25 @ 00:24)       Bilirubin Total: 0.4 mg/dL (02-03-25 @ 00:20)  Aspartate Aminotransferase (AST/SGOT): 16 U/L (02-03-25 @ 00:20)  Alanine Aminotransferase (ALT/SGPT): 10 U/L (02-03-25 @ 00:20)  Alkaline Phosphatase: 122 U/L (02-03-25 @ 00:20)    Bilirubin Total: 0.5 mg/dL (02-02-25 @ 00:27)  Aspartate Aminotransferase (AST/SGOT): 20 U/L (02-02-25 @ 00:27)  Alanine Aminotransferase (ALT/SGPT): 10 U/L (02-02-25 @ 00:27)  Alkaline Phosphatase: 125 U/L (02-02-25 @ 00:27)    Bilirubin Total: 0.5 mg/dL (02-01-25 @ 00:42)  Aspartate Aminotransferase (AST/SGOT): 14 U/L (02-01-25 @ 00:42)  Alanine Aminotransferase (ALT/SGPT): 11 U/L (02-01-25 @ 00:42)  Alkaline Phosphatase: 121 U/L (02-01-25 @ 00:42)  Bilirubin Total: 0.5 mg/dL (01-31-25 @ 00:30)  Aspartate Aminotransferase (AST/SGOT): 16 U/L (01-31-25 @ 00:30)  Alanine Aminotransferase (ALT/SGPT): 10 U/L (01-31-25 @ 00:30)  Alkaline Phosphatase: 123 U/L (01-31-25 @ 00:30)  Bilirubin Total: 0.5 mg/dL (01-30-25 @ 00:24)  Aspartate Aminotransferase (AST/SGOT): 21 U/L (01-30-25 @ 00:24)  Alanine Aminotransferase (ALT/SGPT): 14 U/L (01-30-25 @ 00:24)  Alkaline Phosphatase: 129 U/L (01-30-25 @ 00:24)

## 2025-02-03 NOTE — PROVIDER CONTACT NOTE (CHANGE IN STATUS NOTIFICATION) - BACKGROUND
54 year old male s/p C3L
Patient was on HFNC 50/50 due to signs of O2 saturation decreasing over night on 4LNC.
s/p cardiac Sx
s/p CABG x3 12/30
54 M s/p C3L on 12/30
POD 2 C3L on  and vaso
S/P CABG x3 12/30

## 2025-02-03 NOTE — CHART NOTE - NSCHARTNOTEFT_GEN_A_CORE
NUTRITION FOLLOW UP NOTE    PATIENT SEEN FOR: ICU follow up    SOURCE: [x] Patient  [x] Current Medical Record  [] RN  [] Family/support person at bedside  [] Patient unavailable/inappropriate  [] Other:  -Pt with trach, able to communicate via white board    CHART REVIEWED/EVENTS NOTED.  [] No changes to nutrition care plan to note  [x] Nutrition Status:  -s/p CABG x 3 on 24  -: Intubated for hypoxia & left lung white out   - tracheostomy tube placement. trach collar during the day and vent support at night per flowsheets.  -ESRD - tolerating iHD, on HD PTA.  -s/p I&D and bedside debridement of sacral wound on     DIET ORDER:   Diet, NPO with Tube Feed:   Tube Feeding Modality: Nasogastric  Vital 1.5 Jeremias (VITAL1.5RTH)  Total Volume for 24 Hours (mL): 1560  Continuous  Starting Tube Feed Rate {mL per Hour}: 40  Until Goal Tube Feed Rate (mL per Hour): 65  Tube Feed Duration (in Hours): 24  Tube Feed Start Time: 15:28  No Carb Prosource (1pkg = 15gms Protein)     Qty per Day:  1  Banatrol TF     Qty per Day:  2 (25)      CURRENT DIET ORDER IS:  [] Appropriate:  [] Inadequate:  [x] Other: See recommendation below     NUTRITION INTAKE/PROVISION:  [] PO:  [x] Enteral Nutrition:  -Pt reports tolerating enteral nutrition, eager to eat and reports he can swallow.  EN Order Provides:  xxxx ml formula, xxxx kcal, xx g protein, xxxx ml free water; meets xx kcal/kg and xx g protein/kg, based on wt:    Current Pump Rate: 65 ml/hr at time of RD visit  5-Day Average Enteral Provision per RN flowsheet: 544 mL, 963 kcals, 44 g protein   [] Parenteral Nutrition:    ANTHROPOMETRICS:  Drug Dosing Weight  Height (cm): 180.3 (30 Dec 2024 12:01)  Weight (kg): 85.1 (30 Dec 2024 12:01)  BMI (kg/m2): 26.2 (30 Dec 2024 12:01)  Weights:   Daily Weight in k (-), 86 (-), 86 (), 87.1 (), 88.1 (), 89.8 (), 91.2 (), 97.1 (), 85.6 (), 81.6 (), Weight in k.7 (), 80 (), 88 (), 83.4 (), 77.7 (), 82 (01-15), 81 (), 81 (), 88.7 (), 83.3 (), 97.6 (01-10), 79.6 (), 93.4 (), 87.9 (), 85 (), 86.2 (), 90 (), 84 (), 77.5 (), 87.8 (), 88.3 (, standing), 85.3 (, standing), 85 (, standing), 79.7 (), 80.9 (, standing), 81.3 (, standing), 82.6 ().  Weight fluctuations likely in setting of fluid shifts, scale inaccuracies, and/or prior Inadequate energy intake.    NUTRITIONALLY PERTINENT MEDICATIONS:  MEDICATIONS  (STANDING):  ascorbic acid 500 milliGRAM(s) Oral daily  atorvastatin 80 milliGRAM(s) Oral at bedtime  dextrose 50% Injectable 50 milliLiter(s) IV Push every 15 minutes  insulin lispro (ADMELOG) corrective regimen sliding scale   SubCutaneous every 6 hours  linezolid  IVPB      linezolid  IVPB 600 milliGRAM(s) IV Intermittent every 12 hours  meropenem  IVPB 500 milliGRAM(s) IV Intermittent every 24 hours  methocarbamol 500 milliGRAM(s) Oral every 8 hours  mirtazapine 7.5 milliGRAM(s) Oral daily  Nephro-elicia 1 Tablet(s) Oral daily  pantoprazole  Injectable 40 milliGRAM(s) IV Push daily  polyethylene glycol 3350 17 Gram(s) Oral two times a day  pregabalin 50 milliGRAM(s) Oral every 12 hours  sodium chloride 0.9%. 1000 milliLiter(s) (10 mL/Hr) IV Continuous <Continuous>    MEDICATIONS  (PRN):  acetaminophen     Tablet .. 650 milliGRAM(s) Oral every 6 hours PRN Mild Pain (1 - 3)  lidocaine/prilocaine Cream 1 Application(s) Topical daily PRN pre-HD  oxyCODONE    IR 5 milliGRAM(s) Oral every 4 hours PRN Moderate Pain (4 - 6)  sodium chloride 0.9% lock flush 10 milliLiter(s) IV Push every 1 hour PRN Pre/post blood products, medications, blood draw, and to maintain line patency         NUTRITIONALLY PERTINENT LABS:   Na126 mmol/L[L] Glu 96 mg/dL K+ 5.9 mmol/L[H] Cr  5.74 mg/dL[H] BUN 40 mg/dL[H]  Phos 4.7 mg/dL[H]  Alb 3.0 g/dL[L] ALT 10 U/L AST 16 U/L Alkaline Phosphatase 122 U/L[H]    A1C with Estimated Average Glucose Result: 5.6 % (24 @ 06:50)          Finger Sticks:  POCT Blood Glucose.: 175 mg/dL ( @ 09:59)  POCT Blood Glucose.: 82 mg/dL ( @ 06:32)  POCT Blood Glucose.: 325 mg/dL ( @ 05:18)  POCT Blood Glucose.: 200 mg/dL ( @ 04:05)  POCT Blood Glucose.: 80 mg/dL ( @ 03:39)  POCT Blood Glucose.: 90 mg/dL ( @ 02:55)  POCT Blood Glucose.: 131 mg/dL ( @ 01:42)  POCT Blood Glucose.: 119 mg/dL ( @ 01:06)  POCT Blood Glucose.: 162 mg/dL ( @ 23:38)  POCT Blood Glucose.: 131 mg/dL ( @ 18:25)      NUTRITIONALLY PERTINENT MEDICATIONS/LABS:  [x] Reviewed  [x] Relevant notes on medications/labs:  -insulin for glycemic control  -remeron which can stimulate appetite    EDEMA:  [x] Reviewed  [x] Relevant notes: 1+ generalized edema per flowsheets     GI/ I&O:  [x] Reviewed  [x] Relevant notes: patient reports 1 loose BM today thus far.   [] Other:    SKIN:   [x] No pressure injuries documented, per nursing flowsheet  [] Pressure injury previously noted  [] Change in pressure injury documentation:  [x] Other:  -Per Wound Care : "Sacral/buttocks wound 2/2 skin failure, Rt upper back injury now resolved"  -midsternal surgical incision from CABG     ESTIMATED NEEDS:  [x] No change:  [] Updated:  Energy:  3546-1774 kcal/day (30-35 kcal/kg)  Protein:  109-140 g/day (1.4-1.8 g/kg)  Fluid:   ml/day or [x] defer to team  Based on: IBW 172lb/78kg with consideration for HD, skin integrity     NUTRITION DIAGNOSIS:  [x] Prior Dx:  Inadequate energy intake, Increased Nutrient Needs (protein-energy, micronutrients)  [] New Dx:    EDUCATION:  [x] Yes: Patient reports wanting to eat. RD discussed that po diet / diet texture recommendations deferred to provider and speech language pathologist. Pt made aware RD remains available and will follow up for any additional questions/concerns and per protocol.   [] Not appropriate/warranted    NUTRITION CARE PLAN:  1. Diet:  2. Supplements:  3. Multivitamin/mineral supplementation:  4:     [] Achieved - Continue current nutrition intervention(s)  [] Current medical condition precludes nutrition intervention at this time.    MONITORING AND EVALUATION:   RD remains available upon request and will follow up per protocol.    Corinne Lima RDN, CDN - available via [facility] TE[facility] NUTRITION FOLLOW UP NOTE    PATIENT SEEN FOR: ICU follow up    SOURCE: [x] Patient  [x] Current Medical Record  [] RN  [] Family/support person at bedside  [] Patient unavailable/inappropriate  [] Other:  -Pt with trach, able to communicate via white board    CHART REVIEWED/EVENTS NOTED.  [] No changes to nutrition care plan to note  [x] Nutrition Status:  -s/p CABG x 3 on 24  -: Intubated for hypoxia & left lung white out   - tracheostomy tube placement. trach collar during the day and vent support at night per flowsheets.  -ESRD - tolerating iHD, on HD PTA.  -s/p I&D and bedside debridement of sacral wound on     DIET ORDER:   Diet, NPO with Tube Feed:   Tube Feeding Modality: Nasogastric  Vital 1.5 Jeremias (VITAL1.5RTH)  Total Volume for 24 Hours (mL): 1560  Continuous  Starting Tube Feed Rate {mL per Hour}: 40  Until Goal Tube Feed Rate (mL per Hour): 65  Tube Feed Duration (in Hours): 24  Tube Feed Start Time: 15:28  No Carb Prosource (1pkg = 15gms Protein)     Qty per Day:  1  Banatrol TF     Qty per Day:  2 (25)      CURRENT DIET ORDER IS:  [] Appropriate:  [] Inadequate:  [x] Other: See recommendation below     NUTRITION INTAKE/PROVISION:  [] PO:  [x] Enteral Nutrition:  -Pt reports tolerating enteral nutrition, eager to eat and reports he can swallow.  EN Order + Prosource TF 1x/day Provides: 1560ml, 2430kcal, 120g protein, 1192ml free water; meets 31.2kcal/kg and 1.54g/kg protein based on IBW 172lb/78kg. Defer free water flushes to medical team.     Current Pump Rate: 65 ml/hr at time of RD visit  5-Day Average Enteral Provision per RN flowsheet: 1208 mL, 1812 kcals, 82 g protein   [] Parenteral Nutrition:    ANTHROPOMETRICS:  Drug Dosing Weight  Height (cm): 180.3 (30 Dec 2024 12:01)  Weight (kg): 85.1 (30 Dec 2024 12:01)  BMI (kg/m2): 26.2 (30 Dec 2024 12:01)  Weights:   Daily Weight in k (02-03), 86 (), 86 (), 87.1 (), 88.1 (), 89.8 (), 91.2 (), 97.1 (), 85.6 (), 81.6 (), Weight in k.7 (), 80 (), 88 (), 83.4 (), 77.7 (), 82 (01-15), 81 (), 81 (), 88.7 (), 83.3 (), 97.6 (01-10), 79.6 (), 93.4 (), 87.9 (), 85 (), 86.2 (), 90 (), 84 (), 77.5 (), 87.8 (), 88.3 (, standing), 85.3 (, standing), 85 (, standing), 79.7 (), 80.9 (, standing), 81.3 (, standing), 82.6 ().  Weight fluctuations likely in setting of fluid shifts, scale inaccuracies, and/or prior Inadequate energy intake.    NUTRITIONALLY PERTINENT MEDICATIONS:  MEDICATIONS  (STANDING):  ascorbic acid 500 milliGRAM(s) Oral daily  atorvastatin 80 milliGRAM(s) Oral at bedtime  dextrose 50% Injectable 50 milliLiter(s) IV Push every 15 minutes  insulin lispro (ADMELOG) corrective regimen sliding scale   SubCutaneous every 6 hours  linezolid  IVPB      linezolid  IVPB 600 milliGRAM(s) IV Intermittent every 12 hours  meropenem  IVPB 500 milliGRAM(s) IV Intermittent every 24 hours  methocarbamol 500 milliGRAM(s) Oral every 8 hours  mirtazapine 7.5 milliGRAM(s) Oral daily  Nephro-elicia 1 Tablet(s) Oral daily  pantoprazole  Injectable 40 milliGRAM(s) IV Push daily  polyethylene glycol 3350 17 Gram(s) Oral two times a day  pregabalin 50 milliGRAM(s) Oral every 12 hours  sodium chloride 0.9%. 1000 milliLiter(s) (10 mL/Hr) IV Continuous <Continuous>    MEDICATIONS  (PRN):  acetaminophen     Tablet .. 650 milliGRAM(s) Oral every 6 hours PRN Mild Pain (1 - 3)  lidocaine/prilocaine Cream 1 Application(s) Topical daily PRN pre-HD  oxyCODONE    IR 5 milliGRAM(s) Oral every 4 hours PRN Moderate Pain (4 - 6)  sodium chloride 0.9% lock flush 10 milliLiter(s) IV Push every 1 hour PRN Pre/post blood products, medications, blood draw, and to maintain line patency         NUTRITIONALLY PERTINENT LABS:   Na126 mmol/L[L] Glu 96 mg/dL K+ 5.9 mmol/L[H] Cr  5.74 mg/dL[H] BUN 40 mg/dL[H]  Phos 4.7 mg/dL[H]  Alb 3.0 g/dL[L] ALT 10 U/L AST 16 U/L Alkaline Phosphatase 122 U/L[H]    A1C with Estimated Average Glucose Result: 5.6 % (24 @ 06:50)          Finger Sticks:  POCT Blood Glucose.: 175 mg/dL ( @ 09:59)  POCT Blood Glucose.: 82 mg/dL ( @ 06:32)  POCT Blood Glucose.: 325 mg/dL ( @ 05:18)  POCT Blood Glucose.: 200 mg/dL ( @ 04:05)  POCT Blood Glucose.: 80 mg/dL ( @ 03:39)  POCT Blood Glucose.: 90 mg/dL ( @ 02:55)  POCT Blood Glucose.: 131 mg/dL ( @ 01:42)  POCT Blood Glucose.: 119 mg/dL ( @ 01:06)  POCT Blood Glucose.: 162 mg/dL ( @ 23:38)  POCT Blood Glucose.: 131 mg/dL ( @ 18:25)      NUTRITIONALLY PERTINENT MEDICATIONS/LABS:  [x] Reviewed  [x] Relevant notes on medications/labs:  -insulin for glycemic control  -remeron which can stimulate appetite    EDEMA:  [x] Reviewed  [x] Relevant notes: 1+ generalized edema per flowsheets     GI/ I&O:  [x] Reviewed  [x] Relevant notes: patient reports 1 loose BM today thus far.   [] Other:    SKIN:   [x] No pressure injuries documented, per nursing flowsheet  [] Pressure injury previously noted  [] Change in pressure injury documentation:  [x] Other:  -Per Wound Care : "Sacral/buttocks wound 2/2 skin failure, Rt upper back injury now resolved"  -midsternal surgical incision from CABG     ESTIMATED NEEDS:  [x] No change:  [] Updated:  Energy:  3946-3006 kcal/day (30-35 kcal/kg)  Protein:  109-140 g/day (1.4-1.8 g/kg)  Fluid:   ml/day or [x] defer to team  Based on: IBW 172lb/78kg with consideration for HD, skin integrity     NUTRITION DIAGNOSIS:  [x] Prior Dx:  Inadequate energy intake, Increased Nutrient Needs (protein-energy, micronutrients)  [] New Dx:    EDUCATION:  [x] Yes: Patient reports wanting to eat. RD discussed that po diet / diet texture recommendations deferred to provider and speech language pathologist. Pt made aware RD remains available and will follow up for any additional questions/concerns and per protocol.   [] Not appropriate/warranted    NUTRITION CARE PLAN:  1. Diet: Change formula to Nepro @ 60ml/hr x 24hr. At goal provides 1440ml, 2592kcal, 117g protein, 1047ml free water; meets 33.2kcal/kg and 1.50g/kg protein based on IBW 172lb/78kg. Defer free water flushes to medical team.   2. Supplements: discontinue Prosource TF Free 1x/day. Continue Banatrol 2x/day as indicated.  3. Multivitamin/mineral supplementation: continue nephro-elicia, defer Vitamin C to medial team in setting of HD.    [] Achieved - Continue current nutrition intervention(s)  [] Current medical condition precludes nutrition intervention at this time.    MONITORING AND EVALUATION:   RD remains available upon request and will follow up per protocol.    Corinne Lima RDN, CDN - available via [facility] TE[facility]

## 2025-02-03 NOTE — PROVIDER CONTACT NOTE (CHANGE IN STATUS NOTIFICATION) - SITUATION
pt in atrial fibrillation RVR rate of 120; BP maintained WNL
Patient had 6 beat run of ventricular tachycardia.
pt SpO2 desatting to 80%; increase need for vasoactive medications; pt on BiPAP; obtunded and responding only to vigorous stimulation
Patient went into respiratory distress which lead to cardiac arrest prior to bronch while healthcare team was present in the room
Patient in rapid Afib rate 120s
Patient lethargic, s/p bronch with fentanyl and precedex. Temp 102.2. Increase in pressor requirements, re- added levophed.
Increasing pressor requirements
Patient displaying behaviors that are potentially dangerous to self.

## 2025-02-03 NOTE — PROVIDER CONTACT NOTE (CHANGE IN STATUS NOTIFICATION) - ACTION/TREATMENT ORDERED:
1g magnesium IVPB
None
Blood cx, IV Tylenol, Antibiotics after blood cx, Albumin 250 cc
IABP placed at bedside by Orestes NICHOLS
perform 12-lead EKG and hold dobutamine gtt
pt intubated; Tracie NICHOLS at bedside
2g mag IVPB, 12 lead ECG.
no

## 2025-02-03 NOTE — PROGRESS NOTE ADULT - SUBJECTIVE AND OBJECTIVE BOX
[facility]    Dr. Nica Styles     Office [phone] (9 am to 5 pm)  Service: [phone] (5pm to 9am)  Also Available on TEAMS      RENAL PROGRESS NOTE: DATE OF SERVICE 02-03-25 @ 09:31    Patient is a 54y old  Male who presents with a chief complaint of CAD (02 Feb 2025 08:01)      Patient seen and examined at bedside. No chest pain/sob    VITALS:  T(F): 97.6 (02-03-25 @ 04:00), Max: 99.2 (02-02-25 @ 20:00)  HR: 101 (02-03-25 @ 09:23)  BP: 137/71 (02-03-25 @ 09:00)  RR: 14 (02-03-25 @ 09:23)  SpO2: 100% (02-03-25 @ 09:23)  Wt(kg): --    02-02 @ 07:01  -  02-03 @ 07:00  --------------------------------------------------------  IN: 2600 mL / OUT: 0 mL / NET: 2600 mL    02-03 @ 07:01  -  02-03 @ 09:31  --------------------------------------------------------  IN: 210 mL / OUT: 0 mL / NET: 210 mL          PHYSICAL EXAM:  Constitutional: NAD  Neck: No JVD  Respiratory: CTAB, no wheezes, rales or rhonchi  Cardiovascular: S1, S2, RRR  Gastrointestinal: BS+, soft, NT/ND  Extremities: No peripheral edema    Hospital Medications:   MEDICATIONS  (STANDING):  acetylcysteine 10%  Inhalation 4 milliLiter(s) Inhalation every 6 hours  albuterol    0.083% 2.5 milliGRAM(s) Nebulizer every 6 hours  ascorbic acid 500 milliGRAM(s) Oral daily  aspirin  chewable 81 milliGRAM(s) Oral daily  atorvastatin 80 milliGRAM(s) Oral at bedtime  chlorhexidine 0.12% Liquid 15 milliLiter(s) Oral Mucosa every 12 hours  chlorhexidine 2% Cloths 1 Application(s) Topical daily  chlorhexidine 4% Liquid 1 Application(s) Topical <User Schedule>  dextrose 50% Injectable 50 milliLiter(s) IV Push every 15 minutes  epoetin ignacia (EPOGEN) Injectable 59738 Unit(s) IV Push <User Schedule>  heparin   Injectable 5000 Unit(s) SubCutaneous every 8 hours  insulin lispro (ADMELOG) corrective regimen sliding scale   SubCutaneous every 6 hours  linezolid  IVPB      linezolid  IVPB 600 milliGRAM(s) IV Intermittent every 12 hours  melatonin 5 milliGRAM(s) Oral at bedtime  meropenem  IVPB 500 milliGRAM(s) IV Intermittent every 24 hours  methocarbamol 500 milliGRAM(s) Oral every 8 hours  mirtazapine 7.5 milliGRAM(s) Oral daily  Nephro-elicia 1 Tablet(s) Oral daily  pantoprazole  Injectable 40 milliGRAM(s) IV Push daily  polyethylene glycol 3350 17 Gram(s) Oral two times a day  pregabalin 50 milliGRAM(s) Oral every 8 hours  sodium chloride 0.65% Nasal 1 Spray(s) Both Nostrils every 12 hours  sodium chloride 0.9%. 1000 milliLiter(s) (10 mL/Hr) IV Continuous <Continuous>  traZODone 50 milliGRAM(s) Oral at bedtime      LABS:  02-03    x   |  x   |  x   ----------------------------<  x   5.2   |  x   |  x     Ca    9.0      03 Feb 2025 00:20  Phos  4.7     02-03  Mg     2.1     02-03    TPro  6.7  /  Alb  3.0[L]  /  TBili  0.4  /  DBili      /  AST  16  /  ALT  10  /  AlkPhos  122[H]  02-03    Creatinine Trend: 5.38 <--, 4.78 <--, 3.78 <--, 5.05 <--, 3.99 <--, 5.51 <--, 4.57 <--    Albumin: 3.0 g/dL (02-03 @ 00:20)  Phosphorus: 4.7 mg/dL (02-03 @ 00:20)                              8.6    10.02 )-----------( 244      ( 03 Feb 2025 00:20 )             27.5     Urine Studies:  Urinalysis - [02-03-25 @ 00:20]      Color  / Appearance  / SG  / pH       Gluc 157 / Ketone   / Bili  / Urobili        Blood  / Protein  / Leuk Est  / Nitrite       RBC  / WBC  / Hyaline  / Gran  / Sq Epi  / Non Sq Epi  / Bacteria       Iron 29, TIBC 143, %sat 20      [12-19-24 @ 05:00]  Ferritin 904      [12-19-24 @ 05:00]  TSH 4.75      [12-24-24 @ 06:50]        RADIOLOGY & ADDITIONAL STUDIES:

## 2025-02-03 NOTE — PROGRESS NOTE ADULT - SUBJECTIVE AND OBJECTIVE BOX
Patient seen and examined at the bedside.    Remains critically ill on continuous ICU monitoring, at risk for life threatening decompensation.  All Labs, data reviewed. Plan of care discussed in length during multi-disciplinary ICU rounds.       Brief Summary:  53 yo M with multiple medical problems including CAD s/p PCI in April 2024 s/p CABG x 3 on 12/30/24  1/2: Intubated for hypoxia & left lung white out s/p awake bronch with removal of copious mucopurulent secretions  1/4: s/p IABP insertion, sepsis/septic shock due to E coli   ESBL pneumonia, collapsed Left Lung, s/p multiple bronch   1/18 tracheostomy tube placement   1/22 VRE E. Faecium Bcx  1/24 +HSV PCR BAL  1/26 tissue cx from back abscess - Serratia   1/27-29 - intermittent bradycardia/junctional episodes with hypotension    24 Hour events:  TC during the day   Did not have recurrent bradyarrhythmia       Objective:  Vital Signs Last 24 Hrs  T(C): 36.4 (03 Feb 2025 04:00), Max: 37.3 (02 Feb 2025 20:00)  T(F): 97.6 (03 Feb 2025 04:00), Max: 99.2 (02 Feb 2025 20:00)  HR: 110 (03 Feb 2025 07:00) (91 - 114)  BP: 128/76 (03 Feb 2025 07:00) (118/76 - 168/85)  BP(mean): 95 (03 Feb 2025 07:00) (90 - 126)  RR: 15 (03 Feb 2025 07:00) (10 - 28)  SpO2: 100% (03 Feb 2025 07:00) (82% - 100%)    Parameters below as of 03 Feb 2025 06:00      O2 Concentration (%): 50    Mode: AC/ CMV (Assist Control/ Continuous Mandatory Ventilation)  RR (machine): 22  FiO2: 40  PEEP: 10  ITime: 1  MAP: 11  PC: 12  PIP: 20              Physical Exam:   General: Alert and interactive,   Neurology: Oriented, following commands. ambulates  Respiratory: Breath sounds bilaterally, trach collar  CV: Sinus  Abdominal: Soft, Nontender  Extremities: Warm, well-perfused  Back: open abscess/wound to right paralumbar area, indurated, no fluctuance, no crepitus; sacral wound   Right arm tender, but non-infectious appearing  -------------------------------------------------------------------------------------------------------------------------------    Labs:                        8.6    10.02 )-----------( 244      ( 03 Feb 2025 00:20 )             27.5     02-03    x   |  x   |  x   ----------------------------<  x   5.2   |  x   |  x     Ca    9.0      03 Feb 2025 00:20  Phos  4.7     02-03  Mg     2.1     02-03    TPro  6.7  /  Alb  3.0[L]  /  TBili  0.4  /  DBili  x   /  AST  16  /  ALT  10  /  AlkPhos  122[H]  02-03    LIVER FUNCTIONS - ( 03 Feb 2025 00:20 )  Alb: 3.0 g/dL / Pro: 6.7 g/dL / ALK PHOS: 122 U/L / ALT: 10 U/L / AST: 16 U/L / GGT: x           PT/INR - ( 03 Feb 2025 00:20 )   PT: 13.4 sec;   INR: 1.18 ratio         PTT - ( 03 Feb 2025 00:20 )  PTT:37.5 sec        ALL MEDICATIONS   MEDICATIONS  (STANDING):  acetylcysteine 10%  Inhalation 4 milliLiter(s) Inhalation every 6 hours  albuterol    0.083% 2.5 milliGRAM(s) Nebulizer every 6 hours  ascorbic acid 500 milliGRAM(s) Oral daily  aspirin  chewable 81 milliGRAM(s) Oral daily  atorvastatin 80 milliGRAM(s) Oral at bedtime  chlorhexidine 0.12% Liquid 15 milliLiter(s) Oral Mucosa every 12 hours  chlorhexidine 2% Cloths 1 Application(s) Topical daily  chlorhexidine 4% Liquid 1 Application(s) Topical <User Schedule>  dextrose 50% Injectable 50 milliLiter(s) IV Push every 15 minutes  epoetin ignacia (EPOGEN) Injectable 62665 Unit(s) IV Push <User Schedule>  heparin   Injectable 5000 Unit(s) SubCutaneous every 8 hours  insulin lispro (ADMELOG) corrective regimen sliding scale   SubCutaneous every 6 hours  linezolid  IVPB      linezolid  IVPB 600 milliGRAM(s) IV Intermittent every 12 hours  melatonin 5 milliGRAM(s) Oral at bedtime  meropenem  IVPB 500 milliGRAM(s) IV Intermittent every 24 hours  methocarbamol 500 milliGRAM(s) Oral every 8 hours  mirtazapine 7.5 milliGRAM(s) Oral daily  Nephro-elicia 1 Tablet(s) Oral daily  pantoprazole  Injectable 40 milliGRAM(s) IV Push daily  polyethylene glycol 3350 17 Gram(s) Oral two times a day  pregabalin 50 milliGRAM(s) Oral every 8 hours  sodium chloride 0.65% Nasal 1 Spray(s) Both Nostrils every 12 hours  sodium chloride 0.9%. 1000 milliLiter(s) (10 mL/Hr) IV Continuous <Continuous>  traZODone 50 milliGRAM(s) Oral at bedtime    MEDICATIONS  (PRN):  acetaminophen     Tablet .. 650 milliGRAM(s) Oral every 6 hours PRN Mild Pain (1 - 3)  lidocaine/prilocaine Cream 1 Application(s) Topical daily PRN pre-HD  oxyCODONE    IR 5 milliGRAM(s) Oral every 4 hours PRN Moderate Pain (4 - 6)  oxyCODONE    IR 10 milliGRAM(s) Oral every 4 hours PRN Severe Pain (7 - 10)  sodium chloride 0.9% lock flush 10 milliLiter(s) IV Push every 1 hour PRN Pre/post blood products, medications, blood draw, and to maintain line patency  ------------------------------------------------------------------------------------------------------------------------------  Assessment:  53 yo M s/p CABG x 3 on 12/30/24    Postop acute respiratory failure  Acute blood loss anemia  Ascites    ESRD on iHD   E coli bacteremia 2/2 pneumonia  VRE E. Faecium   HSV infection  Tracheomalacia     Plan:  ***Neuro***  Postoperative acute pain control with Gabapentin, Robaxin, Tylenol, Oxycodone prn  Trazodone nightly  PT ongoing    ***Cardiovascular***  s/p CABG x 3  A fib s/p cardioversion  Intermittent bradycardia/junction with hypotension - started on Theophyline - EP eval   TTE 1/27 LVEF 60%, mod RA pericardial effusion   ASA / Statin daily    ***Pulmonary***  Postoperative acute respiratory failure  Tracheostomy 1/18   s/p Bronchoscopy 1/18. 1/20, 1/21, 1/24, 1/28  Mucomyst albuterol  Left lung PNA, + e coli ESBL - cleared but now with VRE   Tolerates trach collar with high flow, keep on a vent at night  On HT 3% nebs and albuterol  Bronchoscopy yesterday by pulmonology    ***GI***  On Tube feeds at goal, change to vital per nutrition consult  Protonix for stress ulcer prophylaxis.   Ascites: Last paracentesis 1/11    ***Renal***  ESRD - tolerating iHD (MWF)   Nephro-Elicia for renal support    ***ID***  Monitor cultures  1/28 BAL - commensal manjit   1/24 VRE BAL - Linezolid   1/24 Acyclovir for + HSV in BAL  1/26 serratia on tissue cx - Meropenem until 2/2  hx of ESBL pneumonia - cleared with Meropenem     ***Endocrine***  Hyperglycemia - Insulin sliding scale    ***Hematology***  Acute blood loss anemia  CBC, coags  Continue  Epogen.  Heparin SQ for DVT  Needs full AC s/p cardioversion      *** Lines ***  RUE Midline  RUE PIV        I, Stella Hodges MD, personally performed the services described in this documentation. all medical record entries made by the scribe were at my direction and in my presence. I have reviewed the chart and agree that the record reflects my personal performance and is accurate and complete  Electronically signed:   Stella Hodges MD  CT ICU attending     ICU time: ** mins     By signing my name below, I, Sonia Sánchez, attest that this documentation has been prepared under the direction and in the presence of Stella Hodges MD  Electronically signed: Sonia Sánchez, 02-03-25 @ 08:08             Patient seen and examined at the bedside.    Remains critically ill on continuous ICU monitoring, at risk for life threatening decompensation.  All Labs, data reviewed. Plan of care discussed in length during multi-disciplinary ICU rounds.       Brief Summary:  55 yo M with multiple medical problems including CAD s/p PCI in 2024 s/p CABG x 3 on 24  1: Intubated for hypoxia & left lung white out s/p awake bronch with removal of copious mucopurulent secretions  : s/p IABP insertion, sepsis/septic shock due to E coli   ESBL pneumonia, collapsed Left Lung, s/p multiple bronch    tracheostomy tube placement    VRE E. Faecium Bcx   +HSV PCR BAL   tissue cx from back abscess - Serratia   - - intermittent bradycardia/junctional episodes with hypotension    24 Hour events:  TC during the day and PC: 12/10//15//50% at night  overnight Afib w/ HR in 100's, off /theophylline  Did not have recurrent bradyarrhythmia   hyperK and HD today  s/p sacral debridement: f/u wound care and need for Wound Vac  ID recs for EOT for ABTs  not on AC due to moderate pericardial effusion - will get 2decho today to assess pericardial effusion      Objective:  Vital Signs Last 24 Hrs  T(C): 36.4 (2025 04:00), Max: 37.3 (2025 20:00)  T(F): 97.6 (2025 04:00), Max: 99.2 (2025 20:00)  HR: 110 (2025 07:00) (91 - 114)  BP: 128/76 (2025 07:00) (118/76 - 168/85)  BP(mean): 95 (2025 07:00) (90 - 126)  RR: 15 (2025 07:00) (10 - 28)  SpO2: 100% (2025 07:00) (82% - 100%)    Parameters below as of 2025 06:00      O2 Concentration (%): 50    Mode: AC/ CMV (Assist Control/ Continuous Mandatory Ventilation)  RR (machine): 22  FiO2: 40  PEEP: 10  ITime: 1  MAP: 11  PC: 12  PIP: 20              Physical Exam:   General: Alert and interactive,   Neurology: Oriented, following commands. ambulates  Respiratory: Breath sounds bilaterally, trach collar  CV: Sinus  Abdominal: Soft, Nontender  Extremities: Warm, well-perfused  Back: open abscess/wound to right paralumbar area, indurated, no fluctuance, no crepitus; sacral wound   Right arm tender, but non-infectious appearing  -------------------------------------------------------------------------------------------------------------------------------    Labs:                        8.6    10.02 )-----------( 244      ( 2025 00:20 )             27.5     02-03    x   |  x   |  x   ----------------------------<  x   5.2   |  x   |  x     Ca    9.0      2025 00:20  Phos  4.7     02-03  Mg     2.1     02-03    TPro  6.7  /  Alb  3.0[L]  /  TBili  0.4  /  DBili  x   /  AST  16  /  ALT  10  /  AlkPhos  122[H]  02-03    LIVER FUNCTIONS - ( 2025 00:20 )  Alb: 3.0 g/dL / Pro: 6.7 g/dL / ALK PHOS: 122 U/L / ALT: 10 U/L / AST: 16 U/L / GGT: x           PT/INR - ( 2025 00:20 )   PT: 13.4 sec;   INR: 1.18 ratio         PTT - ( 2025 00:20 )  PTT:37.5 sec        ALL MEDICATIONS   MEDICATIONS  (STANDING):  acetylcysteine 10%  Inhalation 4 milliLiter(s) Inhalation every 6 hours  albuterol    0.083% 2.5 milliGRAM(s) Nebulizer every 6 hours  ascorbic acid 500 milliGRAM(s) Oral daily  aspirin  chewable 81 milliGRAM(s) Oral daily  atorvastatin 80 milliGRAM(s) Oral at bedtime  chlorhexidine 0.12% Liquid 15 milliLiter(s) Oral Mucosa every 12 hours  chlorhexidine 2% Cloths 1 Application(s) Topical daily  chlorhexidine 4% Liquid 1 Application(s) Topical <User Schedule>  dextrose 50% Injectable 50 milliLiter(s) IV Push every 15 minutes  epoetin ignacia (EPOGEN) Injectable 43926 Unit(s) IV Push <User Schedule>  heparin   Injectable 5000 Unit(s) SubCutaneous every 8 hours  insulin lispro (ADMELOG) corrective regimen sliding scale   SubCutaneous every 6 hours  linezolid  IVPB      linezolid  IVPB 600 milliGRAM(s) IV Intermittent every 12 hours  melatonin 5 milliGRAM(s) Oral at bedtime  meropenem  IVPB 500 milliGRAM(s) IV Intermittent every 24 hours  methocarbamol 500 milliGRAM(s) Oral every 8 hours  mirtazapine 7.5 milliGRAM(s) Oral daily  Nephro-elicia 1 Tablet(s) Oral daily  pantoprazole  Injectable 40 milliGRAM(s) IV Push daily  polyethylene glycol 3350 17 Gram(s) Oral two times a day  pregabalin 50 milliGRAM(s) Oral every 8 hours  sodium chloride 0.65% Nasal 1 Spray(s) Both Nostrils every 12 hours  sodium chloride 0.9%. 1000 milliLiter(s) (10 mL/Hr) IV Continuous <Continuous>  traZODone 50 milliGRAM(s) Oral at bedtime    MEDICATIONS  (PRN):  acetaminophen     Tablet .. 650 milliGRAM(s) Oral every 6 hours PRN Mild Pain (1 - 3)  lidocaine/prilocaine Cream 1 Application(s) Topical daily PRN pre-HD  oxyCODONE    IR 5 milliGRAM(s) Oral every 4 hours PRN Moderate Pain (4 - 6)  oxyCODONE    IR 10 milliGRAM(s) Oral every 4 hours PRN Severe Pain (7 - 10)  sodium chloride 0.9% lock flush 10 milliLiter(s) IV Push every 1 hour PRN Pre/post blood products, medications, blood draw, and to maintain line patency  ------------------------------------------------------------------------------------------------------------------------------  Assessment:  55 yo M s/p CABG x 3 on 24    Postop acute respiratory failure  Acute blood loss anemia  Ascites    ESRD on iHD   E coli bacteremia  pneumonia  VRE E. Faecium   HSV infection  Tracheomalacia     Plan:  ***Neuro***  Postoperative acute pain control with Gabapentin, Robaxin, Tylenol, Oxycodone prn  Trazodone nightly  PT ongoing    ***Cardiovascular***  s/p CABG x 3  A fib s/p cardioversion  Intermittent bradycardia/junction with hypotension - started on Theophyline - EP eval   TTE  LVEF 60%, mod RA pericardial effusion   ASA / Statin daily  2decho to assess for pericardial effusion    ***Pulmonary***  Postoperative acute respiratory failure  Tracheostomy    s/p Bronchoscopy . , , ,   Mucomyst albuterol  Left lung PNA, + e coli ESBL - cleared but now with VRE   Tolerates trach collar with high flow, keep on a vent at night  On HT 3% nebs and albuterol  Bronchoscopy by IP on  - no concerns for tracheomalacia    ***GI***  On Tube feeds at goal, change to vital per nutrition consult  Protonix for stress ulcer prophylaxis.   Ascites: Last paracentesis     ***Renal***  ESRD - tolerating iHD (MWF)   Nephro-Elicia for renal support  iHD today     ***ID***  Monitor cultures   BAL - commensal manjit    VRE BAL - Linezolid    Acyclovir for + HSV in BAL   serratia on tissue cx and h/o ESBL PNA - Meropenem until     ***Endocrine***  Hyperglycemia - Insulin sliding scale    ***Hematology***  Acute blood loss anemia  CBC, coags  Continue  Epogen.  Heparin SQ for DVT  Needs full AC s/p cardioversion      *** Lines ***  RUE Midline  RUE PIV    Wound:  s/p sacral debridement on        I, Stella Hodges MD, personally performed the services described in this documentation. all medical record entries made by the scribe were at my direction and in my presence. I have reviewed the chart and agree that the record reflects my personal performance and is accurate and complete  Electronically signed:   Stella Hodges MD  CT ICU attending     ICU time: ** mins     By signing my name below, I, Sonia Sánchez, attest that this documentation has been prepared under the direction and in the presence of Stella Hodges MD  Electronically signed: Sonia Sánchez, 25 @ 08:08             Patient seen and examined at the bedside.    Remains critically ill on continuous ICU monitoring, at risk for life threatening decompensation.  All Labs, data reviewed. Plan of care discussed in length during multi-disciplinary ICU rounds.       Brief Summary:  55 yo M with multiple medical problems including CAD s/p PCI in 2024 s/p CABG x 3 on 24  1: Intubated for hypoxia & left lung white out s/p awake bronch with removal of copious mucopurulent secretions  : s/p IABP insertion, sepsis/septic shock due to E coli   ESBL pneumonia, collapsed Left Lung, s/p multiple bronch    tracheostomy tube placement    VRE E. Faecium Bcx   +HSV PCR BAL   tissue cx from back abscess - Serratia   - - intermittent bradycardia/junctional episodes with hypotension    24 Hour events:  TC during the day and PC: 12/10//15//50% at night  overnight Afib w/ HR in 100's, off /theophylline  Did not have recurrent bradyarrhythmia   hyperK and HD today  s/p sacral debridement: f/u wound care and need for Wound Vac  ID recs for EOT for ABTs  not on AC due to moderate pericardial effusion - will get 2decho today to assess pericardial effusion      Objective:  Vital Signs Last 24 Hrs  T(C): 36.4 (2025 04:00), Max: 37.3 (2025 20:00)  T(F): 97.6 (2025 04:00), Max: 99.2 (2025 20:00)  HR: 110 (2025 07:00) (91 - 114)  BP: 128/76 (2025 07:00) (118/76 - 168/85)  BP(mean): 95 (2025 07:00) (90 - 126)  RR: 15 (2025 07:00) (10 - 28)  SpO2: 100% (2025 07:00) (82% - 100%)    Parameters below as of 2025 06:00      O2 Concentration (%): 50    Mode: AC/ CMV (Assist Control/ Continuous Mandatory Ventilation)  RR (machine): 22  FiO2: 40  PEEP: 10  ITime: 1  MAP: 11  PC: 12  PIP: 20              Physical Exam:   General: Alert and interactive,   Neurology: Oriented, following commands. ambulates  Respiratory: Breath sounds bilaterally, trach collar  CV: Sinus  Abdominal: Soft, Nontender  Extremities: Warm, well-perfused  Back: open abscess/wound to right paralumbar area, indurated, no fluctuance, no crepitus; sacral wound   Right arm tender, but non-infectious appearing  -------------------------------------------------------------------------------------------------------------------------------    Labs:                        8.6    10.02 )-----------( 244      ( 2025 00:20 )             27.5     02-03    x   |  x   |  x   ----------------------------<  x   5.2   |  x   |  x     Ca    9.0      2025 00:20  Phos  4.7     02-03  Mg     2.1     02-03    TPro  6.7  /  Alb  3.0[L]  /  TBili  0.4  /  DBili  x   /  AST  16  /  ALT  10  /  AlkPhos  122[H]  02-03    LIVER FUNCTIONS - ( 2025 00:20 )  Alb: 3.0 g/dL / Pro: 6.7 g/dL / ALK PHOS: 122 U/L / ALT: 10 U/L / AST: 16 U/L / GGT: x           PT/INR - ( 2025 00:20 )   PT: 13.4 sec;   INR: 1.18 ratio         PTT - ( 2025 00:20 )  PTT:37.5 sec        ALL MEDICATIONS   MEDICATIONS  (STANDING):  acetylcysteine 10%  Inhalation 4 milliLiter(s) Inhalation every 6 hours  albuterol    0.083% 2.5 milliGRAM(s) Nebulizer every 6 hours  ascorbic acid 500 milliGRAM(s) Oral daily  aspirin  chewable 81 milliGRAM(s) Oral daily  atorvastatin 80 milliGRAM(s) Oral at bedtime  chlorhexidine 0.12% Liquid 15 milliLiter(s) Oral Mucosa every 12 hours  chlorhexidine 2% Cloths 1 Application(s) Topical daily  chlorhexidine 4% Liquid 1 Application(s) Topical <User Schedule>  dextrose 50% Injectable 50 milliLiter(s) IV Push every 15 minutes  epoetin ignacia (EPOGEN) Injectable 30393 Unit(s) IV Push <User Schedule>  heparin   Injectable 5000 Unit(s) SubCutaneous every 8 hours  insulin lispro (ADMELOG) corrective regimen sliding scale   SubCutaneous every 6 hours  linezolid  IVPB      linezolid  IVPB 600 milliGRAM(s) IV Intermittent every 12 hours  melatonin 5 milliGRAM(s) Oral at bedtime  meropenem  IVPB 500 milliGRAM(s) IV Intermittent every 24 hours  methocarbamol 500 milliGRAM(s) Oral every 8 hours  mirtazapine 7.5 milliGRAM(s) Oral daily  Nephro-elicia 1 Tablet(s) Oral daily  pantoprazole  Injectable 40 milliGRAM(s) IV Push daily  polyethylene glycol 3350 17 Gram(s) Oral two times a day  pregabalin 50 milliGRAM(s) Oral every 8 hours  sodium chloride 0.65% Nasal 1 Spray(s) Both Nostrils every 12 hours  sodium chloride 0.9%. 1000 milliLiter(s) (10 mL/Hr) IV Continuous <Continuous>  traZODone 50 milliGRAM(s) Oral at bedtime    MEDICATIONS  (PRN):  acetaminophen     Tablet .. 650 milliGRAM(s) Oral every 6 hours PRN Mild Pain (1 - 3)  lidocaine/prilocaine Cream 1 Application(s) Topical daily PRN pre-HD  oxyCODONE    IR 5 milliGRAM(s) Oral every 4 hours PRN Moderate Pain (4 - 6)  oxyCODONE    IR 10 milliGRAM(s) Oral every 4 hours PRN Severe Pain (7 - 10)  sodium chloride 0.9% lock flush 10 milliLiter(s) IV Push every 1 hour PRN Pre/post blood products, medications, blood draw, and to maintain line patency  ------------------------------------------------------------------------------------------------------------------------------  Assessment:  55 yo M s/p CABG x 3 on 24    Postop acute respiratory failure  Acute blood loss anemia  Ascites    ESRD on iHD   E coli bacteremia  pneumonia  VRE E. Faecium   HSV infection  Tracheomalacia     Plan:  ***Neuro***  Postoperative acute pain control with Gabapentin, Robaxin, Tylenol, Oxycodone prn  Trazodone nightly  PT ongoing    ***Cardiovascular***  s/p CABG x 3  A fib s/p cardioversion  Intermittent bradycardia/junction with hypotension - started on Theophyline - EP eval   TTE  LVEF 60%, mod RA pericardial effusion   ASA / Statin daily  2decho to assess for pericardial effusion    ***Pulmonary***  Postoperative acute respiratory failure  Tracheostomy    s/p Bronchoscopy . , , ,   Mucomyst albuterol  Left lung PNA, + e coli ESBL - cleared but now with VRE   Tolerates trach collar with high flow, keep on a vent at night  On HT 3% nebs and albuterol  Bronchoscopy by IP on  - no concerns for tracheomalacia    ***GI***  On Tube feeds at goal, change to vital per nutrition consult  Protonix for stress ulcer prophylaxis.   Ascites: Last paracentesis     ***Renal***  ESRD - tolerating iHD (MWF)   Nephro-Elicia for renal support  iHD today     ***ID***  Monitor cultures   BAL - commensal manjit    VRE BAL - Linezolid    Acyclovir for + HSV in BAL   serratia on tissue cx and h/o ESBL PNA - Meropenem until     ***Endocrine***  Hyperglycemia - Insulin sliding scale    ***Hematology***  Acute blood loss anemia  CBC, coags  Continue  Epogen.  Heparin SQ for DVT  Needs full AC s/p cardioversion      *** Lines ***  RUE Midline  RUE PIV    Wound:  s/p sacral debridement on        I, Stella Hodges MD, personally performed the services described in this documentation. all medical record entries made by the scribe were at my direction and in my presence. I have reviewed the chart and agree that the record reflects my personal performance and is accurate and complete  Electronically signed:   Stella Hodges MD  CT ICU attending     ICU time:56 mins     By signing my name below, I, Sonia Sánchez, attest that this documentation has been prepared under the direction and in the presence of Stella Hodges MD  Electronically signed: Sonia Sánchez, 25 @ 08:08             Patient seen and examined at the bedside.    Remains critically ill on continuous ICU monitoring, at risk for life threatening decompensation.  All Labs, data reviewed. Plan of care discussed in length during multi-disciplinary ICU rounds.       Brief Summary:  53 yo M with multiple medical problems including CAD s/p PCI in 2024 s/p CABG x 3 on 24  1: Intubated for hypoxia & left lung white out s/p awake bronch with removal of copious mucopurulent secretions  : s/p IABP insertion, sepsis/septic shock due to E coli   ESBL pneumonia, collapsed Left Lung, s/p multiple bronch    tracheostomy tube placement    VRE E. Faecium Bcx   +HSV PCR BAL   tissue cx from back abscess - Serratia   - - intermittent bradycardia/junctional episodes with hypotension    24 Hour events:  TC during the day and PC: 12/10//15//50% at night  overnight Afib w/ HR in 100's, off /theophylline  Did not have recurrent bradyarrhythmia   hyperK and HD today  s/p sacral debridement: f/u wound care and need for Wound Vac  ID recs for EOT for ABTs  not on AC due to moderate pericardial effusion - will get 2decho today to assess pericardial effusion      Objective:  Vital Signs Last 24 Hrs  T(C): 36.4 (2025 04:00), Max: 37.3 (2025 20:00)  T(F): 97.6 (2025 04:00), Max: 99.2 (2025 20:00)  HR: 110 (2025 07:00) (91 - 114)  BP: 128/76 (2025 07:00) (118/76 - 168/85)  BP(mean): 95 (2025 07:00) (90 - 126)  RR: 15 (2025 07:00) (10 - 28)  SpO2: 100% (2025 07:00) (82% - 100%)    Parameters below as of 2025 06:00      O2 Concentration (%): 50    Mode: AC/ CMV (Assist Control/ Continuous Mandatory Ventilation)  RR (machine): 22  FiO2: 40  PEEP: 10  ITime: 1  MAP: 11  PC: 12  PIP: 20              Physical Exam:   General: Alert and interactive,   Neurology: Oriented, following commands. ambulates  Respiratory: Breath sounds bilaterally, trach collar  CV: Sinus  Abdominal: Soft, Nontender  Extremities: Warm, well-perfused  Back: open abscess/wound to right paralumbar area, indurated, no fluctuance, no crepitus; sacral wound   Right arm tender, but non-infectious appearing  -------------------------------------------------------------------------------------------------------------------------------    Labs:                        8.6    10.02 )-----------( 244      ( 2025 00:20 )             27.5     02-03    x   |  x   |  x   ----------------------------<  x   5.2   |  x   |  x     Ca    9.0      2025 00:20  Phos  4.7     02-03  Mg     2.1     02-03    TPro  6.7  /  Alb  3.0[L]  /  TBili  0.4  /  DBili  x   /  AST  16  /  ALT  10  /  AlkPhos  122[H]  02-03    LIVER FUNCTIONS - ( 2025 00:20 )  Alb: 3.0 g/dL / Pro: 6.7 g/dL / ALK PHOS: 122 U/L / ALT: 10 U/L / AST: 16 U/L / GGT: x           PT/INR - ( 2025 00:20 )   PT: 13.4 sec;   INR: 1.18 ratio         PTT - ( 2025 00:20 )  PTT:37.5 sec        ALL MEDICATIONS   MEDICATIONS  (STANDING):  acetylcysteine 10%  Inhalation 4 milliLiter(s) Inhalation every 6 hours  albuterol    0.083% 2.5 milliGRAM(s) Nebulizer every 6 hours  ascorbic acid 500 milliGRAM(s) Oral daily  aspirin  chewable 81 milliGRAM(s) Oral daily  atorvastatin 80 milliGRAM(s) Oral at bedtime  chlorhexidine 0.12% Liquid 15 milliLiter(s) Oral Mucosa every 12 hours  chlorhexidine 2% Cloths 1 Application(s) Topical daily  chlorhexidine 4% Liquid 1 Application(s) Topical <User Schedule>  dextrose 50% Injectable 50 milliLiter(s) IV Push every 15 minutes  epoetin ignacia (EPOGEN) Injectable 72763 Unit(s) IV Push <User Schedule>  heparin   Injectable 5000 Unit(s) SubCutaneous every 8 hours  insulin lispro (ADMELOG) corrective regimen sliding scale   SubCutaneous every 6 hours  linezolid  IVPB      linezolid  IVPB 600 milliGRAM(s) IV Intermittent every 12 hours  melatonin 5 milliGRAM(s) Oral at bedtime  meropenem  IVPB 500 milliGRAM(s) IV Intermittent every 24 hours  methocarbamol 500 milliGRAM(s) Oral every 8 hours  mirtazapine 7.5 milliGRAM(s) Oral daily  Nephro-elicia 1 Tablet(s) Oral daily  pantoprazole  Injectable 40 milliGRAM(s) IV Push daily  polyethylene glycol 3350 17 Gram(s) Oral two times a day  pregabalin 50 milliGRAM(s) Oral every 8 hours  sodium chloride 0.65% Nasal 1 Spray(s) Both Nostrils every 12 hours  sodium chloride 0.9%. 1000 milliLiter(s) (10 mL/Hr) IV Continuous <Continuous>  traZODone 50 milliGRAM(s) Oral at bedtime    MEDICATIONS  (PRN):  acetaminophen     Tablet .. 650 milliGRAM(s) Oral every 6 hours PRN Mild Pain (1 - 3)  lidocaine/prilocaine Cream 1 Application(s) Topical daily PRN pre-HD  oxyCODONE    IR 5 milliGRAM(s) Oral every 4 hours PRN Moderate Pain (4 - 6)  oxyCODONE    IR 10 milliGRAM(s) Oral every 4 hours PRN Severe Pain (7 - 10)  sodium chloride 0.9% lock flush 10 milliLiter(s) IV Push every 1 hour PRN Pre/post blood products, medications, blood draw, and to maintain line patency  ------------------------------------------------------------------------------------------------------------------------------  Assessment:  53 yo M s/p CABG x 3 on 24    Postop acute respiratory failure  Acute blood loss anemia  Ascites    ESRD on iHD   E coli bacteremia  pneumonia  VRE E. Faecium   HSV infection  Tracheomalacia     Plan:  ***Neuro***  Postoperative acute pain control with Gabapentin, Robaxin, Tylenol, Oxycodone prn  Trazodone nightly  PT ongoing    ***Cardiovascular***  s/p CABG x 3  A fib s/p cardioversion  Intermittent bradycardia/junction with hypotension - started on Theophyline - EP eval   TTE  LVEF 60%, mod RA pericardial effusion   ASA / Statin daily  2decho to assess for pericardial effusion    ***Pulmonary***  Postoperative acute respiratory failure  Tracheostomy    s/p Bronchoscopy . , , ,   Mucomyst albuterol  Left lung PNA, + e coli ESBL - cleared but now with VRE   Tolerates trach collar with high flow, keep on a vent at night  On HT 3% nebs and albuterol  Bronchoscopy by IP on  - no concerns for tracheomalacia    ***GI***  On Tube feeds at goal, change to vital per nutrition consult  Protonix for stress ulcer prophylaxis.   Ascites: Last paracentesis     ***Renal***  ESRD - tolerating iHD (MWF)   Nephro-Elicia for renal support  iHD today     ***ID***  Monitor cultures   BAL - commensal manjit    VRE BAL - Linezolid    Acyclovir for + HSV in BAL   serratia on tissue cx and h/o ESBL PNA - Meropenem until     ***Endocrine***  Hyperglycemia - Insulin sliding scale    ***Hematology***  Acute blood loss anemia  CBC, coags  Continue  Epogen.  Heparin SQ for DVT  No AC s/p cardioversion. not on AC due to moderate pericardial effusion      *** Lines ***  RUE Midline  RUE PIV    Wound:  s/p sacral debridement on  . will d/w Wound care for need for wound vac      IStella MD, personally performed the services described in this documentation. all medical record entries made by the scribe were at my direction and in my presence. I have reviewed the chart and agree that the record reflects my personal performance and is accurate and complete  Electronically signed:   Stella Hodges MD  CT ICU attending     ICU time:56 mins     By signing my name below, I, Sonia Sánchez, attest that this documentation has been prepared under the direction and in the presence of Stella Hodges MD  Electronically signed: Sonia Sánchez, 25 @ 08:08

## 2025-02-03 NOTE — PROVIDER CONTACT NOTE (CRITICAL VALUE NOTIFICATION) - TEST AND RESULT REPORTED:
Patient Outreach Department    Patient was seen recently at an [facility].      States his finger is healing well.  Has PCP info.  
BMP potassium is 5.9

## 2025-02-03 NOTE — PROGRESS NOTE ADULT - ASSESSMENT
54M with PMH of HTN, HLD, ESRD on HD MWF via LUE AVF, ascites (requiring repeat paracenteses q4-6wks), NICM with moderate LV dysfunction w/ EF 35-40%(2023) and CAD s/p atherectomy + SALLIE to D1(4/11/2024) presents to [facility] transferred from [facility] for CABG eval  Nephro on [facility] for HD    ESRD on HD   Regular schedule MWF   Access LUE AVF  Center [facility]  Nephrologist Dr. Bailey  S/p HD on 01/29   HD today   Keep MAP>65  Renal Diet  Consent in physical chart    Hyper/Hypokalemia  Monitor K, will change dialysate fluid as needed    HTN  currently on pressure support  monitor bp     CKD MBD  Can send a PTH  Ca and Phos daily    Anemia  CRISTIANE with HD  Transfuse if hgb <7    CAD with multivessel disease  s/p CABG 12/30  CTICU following    Hypoxic Resp failure requiring Trach  Per surgery  S/p bronchoscopy, pulm following

## 2025-02-03 NOTE — PROVIDER CONTACT NOTE (CHANGE IN STATUS NOTIFICATION) - RECOMMENDATIONS
alert provider; possible prep for intubation
prep for mechanical circulatory support
see cardiac arrest sheet.
Blood cx, IV Tylenol, Antibiotics
assess pt
Consider 1:1 sitter.
Consider starting beta-blockers. 12 lead ECG.
assess patient

## 2025-02-03 NOTE — PROGRESS NOTE ADULT - SUBJECTIVE AND OBJECTIVE BOX
MR#[id number]  PATIENT NAME:RAAD CANO    DATE OF SERVICE: 02-03-25 @ 22:32  Patient was seen and examined by Solo Quick MD on    02-03-25 @ 22:32 .  Interim events noted.Consultant notes ,Labs,Telemetry reviewed by me       HOSPITAL COURSE: HPI:  54M with PMH of HTN, HLD, ESRD on HD MWF via LUE AVF, ascites (requiring repeat paracenteses q4-6wks), NICM with moderate LV dysfunction w/ EF 35-40%(2023) and CAD s/p atherectomy + SALLIE to D1(4/11/2024) presents to [facility] transferred from [facility]. Patient initially presented to [facility] ED with shortness of breath. Of note he was recently admitted from 09/27-12/02 for acute cholecystitis s/p cholecystectomy. In [facility] ED, pt was hypoxic to 86% on RA, trop 1.7K, EKG no new changes, CXR fluid overload. Admitted for AHRF 2/2 fluid overload. Pt received HD on 12/18, 12/19, 12/20 and 12/22. DAPT was resumed, TTE showed improvement in LVEF to 40-45%. Stress test was abnormal with 1mm inferior STD, reversible ischemia in the inferior wall and fixed defects in the lateral, anterior and apical walls with post stress EF of 24%. Pt was then transferred to [facility] for cardiac cath which showed pLAD 70% ISR, mLCx 70%, D1 severe dz. Patient now transferred to [facility] CTS Dr. Rivers for CABG eval. Patient denies any current SOB or chest pain on admission. Otherwise denies headaches, abdominal pain, urinary or bowel changes, fevers, chills, N/V/D, sick contacts.  (24 Dec 2024 05:07)      INTERIM EVENTS:Patient seen at bedside ,interim events noted.      PMH -reviewed admission note, no change since admission  HEART FAILURE: Acute[ ]Chronic[ ] Systolic[ ] Diastolic[ ] Combined Systolic and Diastolic[ ]  CAD[ ] CABG[ ] PCI[ ]  DEVICES[ ] PPM[ ] ICD[ ] ILR[ ]  ATRIAL FIBRILLATION[ ] Paroxysmal[ ] Permanent[ ] CHADS2-[  ]  GHASSAN[ ] CKD1[ ] CKD2[ ] CKD3[ ] CKD4[ ] ESRD[ ]  COPD[ ] HTN[ ]   DM[ ] Type1[ ] Type 2[ ]   CVA[ ] Paresis[ ]    AMBULATION: Assisted[ ] Cane/walker[ ] Independent[ ]    MEDICATIONS  (STANDING):  acetylcysteine 10%  Inhalation 4 milliLiter(s) Inhalation every 6 hours  albuterol    0.083% 2.5 milliGRAM(s) Nebulizer every 6 hours  ascorbic acid 500 milliGRAM(s) Oral daily  aspirin  chewable 81 milliGRAM(s) Oral daily  atorvastatin 80 milliGRAM(s) Oral at bedtime  chlorhexidine 0.12% Liquid 15 milliLiter(s) Oral Mucosa every 12 hours  chlorhexidine 2% Cloths 1 Application(s) Topical daily  chlorhexidine 4% Liquid 1 Application(s) Topical <User Schedule>  dextrose 50% Injectable 50 milliLiter(s) IV Push every 15 minutes  epoetin ignacia (EPOGEN) Injectable 13356 Unit(s) IV Push <User Schedule>  heparin   Injectable 5000 Unit(s) SubCutaneous every 8 hours  insulin lispro (ADMELOG) corrective regimen sliding scale   SubCutaneous every 6 hours  melatonin 5 milliGRAM(s) Oral at bedtime  meropenem  IVPB 500 milliGRAM(s) IV Intermittent every 24 hours  methocarbamol 500 milliGRAM(s) Oral every 8 hours  mirtazapine 7.5 milliGRAM(s) Oral daily  Nephro-elicia 1 Tablet(s) Oral daily  pantoprazole  Injectable 40 milliGRAM(s) IV Push daily  polyethylene glycol 3350 17 Gram(s) Oral two times a day  pregabalin 50 milliGRAM(s) Oral every 12 hours  sodium chloride 0.65% Nasal 1 Spray(s) Both Nostrils every 12 hours  sodium chloride 0.9%. 1000 milliLiter(s) (10 mL/Hr) IV Continuous <Continuous>  traZODone 50 milliGRAM(s) Oral at bedtime    MEDICATIONS  (PRN):  acetaminophen     Tablet .. 650 milliGRAM(s) Oral every 6 hours PRN Mild Pain (1 - 3)  lidocaine/prilocaine Cream 1 Application(s) Topical daily PRN pre-HD  oxyCODONE    IR 5 milliGRAM(s) Oral every 4 hours PRN Moderate Pain (4 - 6)  sodium chloride 0.9% lock flush 10 milliLiter(s) IV Push every 1 hour PRN Pre/post blood products, medications, blood draw, and to maintain line patency            REVIEW OF SYSTEMS:  Constitutional: [ ] fever, [ ]weight loss,  [ ]fatigue [ ]weight gain  Eyes: [ ] visual changes  Respiratory: [ ]shortness of breath;  [ ] cough, [ ]wheezing, [ ]chills, [ ]hemoptysis  Cardiovascular: [ ] chest pain, [ ]palpitations, [ ]dizziness,  [ ]leg swelling[ ]orthopnea[ ]PND  Gastrointestinal: [ ] abdominal pain, [ ]nausea, [ ]vomiting,  [ ]diarrhea [ ]Constipation [ ]Melena  Genitourinary: [ ] dysuria, [ ] hematuria [ ]Vigil  Neurologic: [ ] headaches [ ] tremors[ ]weakness [ ]Paralysis Right[ ] Left[ ]  Skin: [ ] itching, [ ]burning, [ ] rashes  Endocrine: [ ] heat or cold intolerance  Musculoskeletal: [ ] joint pain or swelling; [ ] muscle, back, or extremity pain  Psychiatric: [ ] depression, [ ]anxiety, [ ]mood swings, or [ ]difficulty sleeping  Hematologic: [ ] easy bruising, [ ] bleeding gums    [ ] All remaining systems negative except as per above.   [ ]Unable to obtain.  [x] No change in ROS since admission      Vital Signs Last 24 Hrs  T(C): 37.3 (03 Feb 2025 20:00), Max: 37.3 (03 Feb 2025 20:00)  T(F): 99.1 (03 Feb 2025 20:00), Max: 99.1 (03 Feb 2025 20:00)  HR: 112 (03 Feb 2025 22:00) (100 - 121)  BP: 112/67 (03 Feb 2025 22:00) (98/79 - 169/80)  BP(mean): 85 (03 Feb 2025 22:00) (85 - 118)  RR: 26 (03 Feb 2025 22:00) (10 - 42)  SpO2: 98% (03 Feb 2025 22:00) (88% - 100%)    Parameters below as of 03 Feb 2025 21:55  Patient On (Oxygen Delivery Method): pt is on [facility]  O2 Flow (L/min): 40  O2 Concentration (%): 50  I&O's Summary    02 Feb 2025 07:01  -  03 Feb 2025 07:00  --------------------------------------------------------  IN: 2600 mL / OUT: 0 mL / NET: 2600 mL    03 Feb 2025 07:01  -  03 Feb 2025 22:32  --------------------------------------------------------  IN: 1665 mL / OUT: 1000 mL / NET: 665 mL        PHYSICAL EXAM:  General: No acute distress BMI-  HEENT: EOMI, PERRL  Neck: Supple, [ ] JVD  Lungs: Equal air entry bilaterally; [ ] rales [ ] wheezing [ ] rhonchi  Heart: Regular rate and rhythm; [x ] murmur   2/6 [ x] systolic [ ] diastolic [ ] radiation[ ] rubs [ ]  gallops  Abdomen: Nontender, bowel sounds present  Extremities: No clubbing, cyanosis, [ ] edema [ ]Pulses  equal and intact  Nervous system:  Alert & Oriented X3, no focal deficits  Psychiatric: Normal affect  Skin: No rashes or lesions    LABS:  02-03    126[L]  |  87[L]  |  40[H]  ----------------------------<  96  5.9[H]   |  26  |  5.74[H]    Ca    9.1      03 Feb 2025 11:34  Phos  4.7     02-03  Mg     2.1     02-03    TPro  6.7  /  Alb  3.0[L]  /  TBili  0.4  /  DBili  x   /  AST  16  /  ALT  10  /  AlkPhos  122[H]  02-03    Creatinine Trend: 5.74<--, 5.38<--, 4.78<--, 3.78<--, 5.05<--, 3.99<--                        8.6    10.02 )-----------( 244      ( 03 Feb 2025 00:20 )             27.5     PT/INR - ( 03 Feb 2025 00:20 )   PT: 13.4 sec;   INR: 1.18 ratio         PTT - ( 03 Feb 2025 00:20 )  PTT:37.5 sec

## 2025-02-03 NOTE — PROGRESS NOTE ADULT - ASSESSMENT
54M with PMH of HTN, HLD, ESRD on HD MWF via LUE AVF, ascites (requiring repeat paracenteses q4-6wks), NICM with moderate LV dysfunction w/ EF 35-40%() and CAD s/p atherectomy + SALLIE to D1(2024) presents to [facility] transferred from [facility]. Patient initially presented to [facility] ED with shortness of breath. Of note he was recently admitted from - for acute cholecystitis s/p cholecystectomy. In [facility] ED, pt was hypoxic to 86% on RA, trop 1.7K, EKG no new changes, CXR fluid overload. Admitted for AHRF 2/2 fluid overload. Pt received HD on , ,  and . DAPT was resumed, TTE showed improvement in LVEF to 40-45%. Stress test was abnormal with 1mm inferior STD, reversible ischemia in the inferior wall and fixed defects in the lateral, anterior and apical walls with post stress EF of 24%.     Pt was then transferred to [facility] for cardiac cath which showed pLAD 70% ISR, mLCx 70%, D1 severe dz. Patient now transferred to [facility] CTS Dr. Rivers for CABG eval.# CAD s/p NSTEMI  Hx CAD s/p PCI LAD   Presented with ADHF elevated troponins  Positive NST1-Cath- pLAD 70% ISR, mLCx 70%, D1 severe dz.   TTE EF 35% Severe TRWas on Plavix-p2y12- 321 been off Plavix since -POD#1-C3L free LIMA-LAD; SVG-Diag; VMA-podaOZ-Wfhhyuwcg on  Sinus rhythm,Amio for AF prophylaxis  -OOB off  Sinus rhythm   -POD#3-On /pressors-EF 25-30% RV failure with transaminitis-Sinus Hqiwrt64/03-POD#4-Was intubated for hypoxia-gradual hypotension on /pressors had IABP inserted this morning  -POD#5-s/p IABP insertion, sepsis/septic shock due to GNR/ECOLI HD cath placed   Bronched yesterday 1/3 - minimal secretions with rust-tinged sputum   ESBL-E Coli bacteremia on meropenam    - POD#7 Atrial Fib ,remains intubated weaning IABP 1:3,off pressors on CRRT  -IABP removed.Remains on vent-Alex 10ppm,off Levo- CVP 10 / MAP 71 / Hct 25.6% / Lactate 1.7-Atrial Fibrillation  -Intubated on Alex 10ppm, gtt, BP better-AF~~90's on Amio-had bronch still febrile Tmax 15747/-Extubated awake alert on HFNC AF,on Dobutrex-CRRT,Cultures no growth    01/10-OOB on BiPAP Sinus Rhythm on -SR/ MAP 57/ CVP 10/  Hct 26.7 / Lact 1.4  -s/p EDU/DCCV-Sinus rhythm on -SR / MAP 52 / CVP 12/ Hct 25.8 / Lact 0.7-had bronch with BAL Left lung  -Sinus rhythm on -for repeat Bronch-SR / MAP 67 / CVP 11 / Hct 27.4% / Lact 0.8  -s/p Trach on  TTE EF 55%-SR / CVP 2 / MAP 63 / Hct 25.9% / Lactate 0.9  -Awake on Trach collar off  c/o buttock pain had bronch yesterday-BAL-Sinus rhythm  -Sinus rhythm on vent at night-BP stable may need to increase hydrallazine 25 mg q8  -TTE EF 60% still needs Vent support at night Bradycardic trial of Bryce now back on   -Awake alert Sinus rhythm BP elevated  remains on ,Nocturnal vent support  resume Hydralazine 25mg q8    -Reverted to AF rvr  -s/p BRonc/BAL debridement sacral decub      # Acute on Chronic Systolic Heart Failure now improved  EF-35% ,severe TR  Had HD post op  GDMT post op  LVEF improved post bypass but now at 23-30%-BiV dysfunction on  now off pressors  -TTE EF 40%,trace effusion  ECG c/w pericarditis-was on colchicine   01/15-TTE EF 55%  -TTE EF 60%     Vascular evaluated  AVGraft /?steal causing RV failure-'' Very low suspicion of steal Sd and AVF causing current clinical state. ''   VA Duplex Hemodialysis Access, Left (25 @ 13:35) >   Arterial flow volume of 1301 mL/m, and the venous flow volume of  1492 mL/m are consistent with a normally functioning dialysis access  fistula.      # Ascites  Hx chronic ascites  Has drainage q4-6 weeks  Last drained -4600mL  ? ascites related to severe TR  Had ebbetlbpaask-Smczuon-qf growth   CT Abdomen 01/10-Large volume ascites-consider paracentesis  Monitor      # ESRD  Now off CRRT transition to HD

## 2025-02-03 NOTE — PROGRESS NOTE ADULT - ASSESSMENT
54M with HTN, HLD, ESRD on HD MWF via LUE AVF, ascites (requiring repeat paracenteses q4-6wks), NICM with moderate LV dysfunction w/ EF 35-40%() and CAD s/p atherectomy + SALLIE to D1 (2024) presents to [facility] 2024 as transfer from [facility] (via [facility]) where he presented  with SOB.   In [facility] ED, pt was hypoxic to 86% on RA, trop 1.7K, EKG no new changes, CXR fluid overload. Admitted for AHRF 2/2 fluid overload. Pt received HD on , ,  and . DAPT was resumed, TTE showed improvement in LVEF to 40-45%. Stress test was abnormal with 1mm inferior STD, reversible ischemia in the inferior wall and fixed defects in the lateral, anterior and apical walls with post stress EF of 24%. Pt was then transferred to [facility] for cardiac cath which showed pLAD 70% ISR, mLCx 70%, D1 severe dz. Patient now transferred to [facility] 2024 for CABG.       - underwent  C3L free LIMA-LAD; SVG-Diag; SVG-leftPL   - extubated   - hypotension and ECHO showing Systolic HF/depressed BIV Function, currently supported with /pressors.  Labs this evening showing transaminitis   - hypotensive, febrile and reintubated  1/3 - cultures (+) E coli +ESBL bacteremia   - underwent tracheostomy   - left lung collapse s/p bronchoscopy   - seen by plastic surgery / colorectal for sacral wound, no surgical intervention, no evidence of fistula, BC sent  - c/o right arm  pain, started on acyclovir for positive HSV PCR     doing a little better tissue culture with serratia; bronch also growing some yeast and VREC   back with nodule with some discharge    sacral decubitus less tender, some bloody secretions from trach  - still with drainage from back lesion    Recommendations  - continue linezolid- day # 12 since last positive blood culture () -would d/c today  the trazdone dose had been lowered but patient still shakey  - can d/c meropenem  (tissue culture with serratia, taken from back)- would treat for 10 days from the positive culture ( )  -->   - surgical f/u appreciated   - yeast in bronch, not clear if true infection, monitor off antifungal for now  - acyclovir, dosed for renal insufficiency      Marla Champion M.D. ,   please reach via teams   If no answer, or after 5PM/ weekends,  then please call  [phone]    Assessment and plan discussed with the NP

## 2025-02-03 NOTE — PROVIDER CONTACT NOTE (CHANGE IN STATUS NOTIFICATION) - DATE AND TIME:
03-Feb-2025 18:00
02-Feb-2025 14:00
02-Jan-2025 18:45
02-Jan-2025 23:00
28-Jan-2025 14:55
03-Jan-2025 06:00
15-Aquilino-2025 13:00
01-Feb-2025 00:00

## 2025-02-04 LAB
-  AMOXICILLIN/CLAVULANIC ACID: SIGNIFICANT CHANGE UP
-  AMPICILLIN/SULBACTAM: SIGNIFICANT CHANGE UP
-  AMPICILLIN: SIGNIFICANT CHANGE UP
-  AZTREONAM: SIGNIFICANT CHANGE UP
-  CEFAZOLIN: SIGNIFICANT CHANGE UP
-  CEFEPIME: SIGNIFICANT CHANGE UP
-  CEFOXITIN: SIGNIFICANT CHANGE UP
-  CEFTRIAXONE: SIGNIFICANT CHANGE UP
-  CIPROFLOXACIN: SIGNIFICANT CHANGE UP
-  CLINDAMYCIN: SIGNIFICANT CHANGE UP
-  DAPTOMYCIN: SIGNIFICANT CHANGE UP
-  ERTAPENEM: SIGNIFICANT CHANGE UP
-  ERYTHROMYCIN: SIGNIFICANT CHANGE UP
-  GENTAMICIN: SIGNIFICANT CHANGE UP
-  GENTAMICIN: SIGNIFICANT CHANGE UP
-  LEVOFLOXACIN: SIGNIFICANT CHANGE UP
-  LINEZOLID: SIGNIFICANT CHANGE UP
-  MEROPENEM: SIGNIFICANT CHANGE UP
-  OXACILLIN: SIGNIFICANT CHANGE UP
-  PENICILLIN: SIGNIFICANT CHANGE UP
-  PIPERACILLIN/TAZOBACTAM: SIGNIFICANT CHANGE UP
-  TETRACYCLINE: SIGNIFICANT CHANGE UP
-  TOBRAMYCIN: SIGNIFICANT CHANGE UP
-  TRIMETHOPRIM/SULFAMETHOXAZOLE: SIGNIFICANT CHANGE UP
-  TRIMETHOPRIM/SULFAMETHOXAZOLE: SIGNIFICANT CHANGE UP
-  VANCOMYCIN: SIGNIFICANT CHANGE UP
ALBUMIN SERPL ELPH-MCNC: 2.8 G/DL — LOW (ref 3.3–5)
ALBUMIN SERPL ELPH-MCNC: 3 G/DL — LOW (ref 3.3–5)
ALP SERPL-CCNC: 118 U/L — SIGNIFICANT CHANGE UP (ref 40–120)
ALP SERPL-CCNC: 133 U/L — HIGH (ref 40–120)
ALT FLD-CCNC: 9 U/L — LOW (ref 10–45)
ALT FLD-CCNC: 9 U/L — LOW (ref 10–45)
ANION GAP SERPL CALC-SCNC: 13 MMOL/L — SIGNIFICANT CHANGE UP (ref 5–17)
AST SERPL-CCNC: 13 U/L — SIGNIFICANT CHANGE UP (ref 10–40)
AST SERPL-CCNC: 15 U/L — SIGNIFICANT CHANGE UP (ref 10–40)
BASOPHILS # BLD AUTO: 0.04 K/UL — SIGNIFICANT CHANGE UP (ref 0–0.2)
BASOPHILS NFR BLD AUTO: 0.4 % — SIGNIFICANT CHANGE UP (ref 0–2)
BILIRUB SERPL-MCNC: 0.4 MG/DL — SIGNIFICANT CHANGE UP (ref 0.2–1.2)
BILIRUB SERPL-MCNC: 0.4 MG/DL — SIGNIFICANT CHANGE UP (ref 0.2–1.2)
BUN SERPL-MCNC: 26 MG/DL — HIGH (ref 7–23)
BUN SERPL-MCNC: 31 MG/DL — HIGH (ref 7–23)
CALCIUM SERPL-MCNC: 8.7 MG/DL — SIGNIFICANT CHANGE UP (ref 8.4–10.5)
CALCIUM SERPL-MCNC: 8.7 MG/DL — SIGNIFICANT CHANGE UP (ref 8.4–10.5)
CHLORIDE SERPL-SCNC: 91 MMOL/L — LOW (ref 96–108)
CHLORIDE SERPL-SCNC: 91 MMOL/L — LOW (ref 96–108)
CO2 SERPL-SCNC: 26 MMOL/L — SIGNIFICANT CHANGE UP (ref 22–31)
CO2 SERPL-SCNC: 26 MMOL/L — SIGNIFICANT CHANGE UP (ref 22–31)
CREAT SERPL-MCNC: 4.12 MG/DL — HIGH (ref 0.5–1.3)
CREAT SERPL-MCNC: 4.58 MG/DL — HIGH (ref 0.5–1.3)
EGFR: 14 ML/MIN/1.73M2 — LOW
EGFR: 14 ML/MIN/1.73M2 — LOW
EGFR: 16 ML/MIN/1.73M2 — LOW
EGFR: 16 ML/MIN/1.73M2 — LOW
EOSINOPHIL # BLD AUTO: 0.86 K/UL — HIGH (ref 0–0.5)
EOSINOPHIL NFR BLD AUTO: 7.6 % — HIGH (ref 0–6)
GLUCOSE SERPL-MCNC: 180 MG/DL — HIGH (ref 70–99)
GLUCOSE SERPL-MCNC: 93 MG/DL — SIGNIFICANT CHANGE UP (ref 70–99)
HCT VFR BLD CALC: 25.1 % — LOW (ref 39–50)
HGB BLD-MCNC: 8 G/DL — LOW (ref 13–17)
IMM GRANULOCYTES NFR BLD AUTO: 0.4 % — SIGNIFICANT CHANGE UP (ref 0–0.9)
LYMPHOCYTES # BLD AUTO: 0.46 K/UL — LOW (ref 1–3.3)
LYMPHOCYTES # BLD AUTO: 4 % — LOW (ref 13–44)
MAGNESIUM SERPL-MCNC: 2.3 MG/DL — SIGNIFICANT CHANGE UP (ref 1.6–2.6)
MCHC RBC-ENTMCNC: 30.3 PG — SIGNIFICANT CHANGE UP (ref 27–34)
MCHC RBC-ENTMCNC: 31.9 G/DL — LOW (ref 32–36)
MCV RBC AUTO: 95.1 FL — SIGNIFICANT CHANGE UP (ref 80–100)
METHOD TYPE: SIGNIFICANT CHANGE UP
METHOD TYPE: SIGNIFICANT CHANGE UP
MONOCYTES # BLD AUTO: 1.43 K/UL — HIGH (ref 0–0.9)
MONOCYTES NFR BLD AUTO: 12.6 % — SIGNIFICANT CHANGE UP (ref 2–14)
NEUTROPHILS # BLD AUTO: 8.55 K/UL — HIGH (ref 1.8–7.4)
NEUTROPHILS NFR BLD AUTO: 75 % — SIGNIFICANT CHANGE UP (ref 43–77)
NRBC # BLD: 0 /100 WBCS — SIGNIFICANT CHANGE UP (ref 0–0)
NRBC BLD-RTO: 0 /100 WBCS — SIGNIFICANT CHANGE UP (ref 0–0)
PHOSPHATE SERPL-MCNC: 3.3 MG/DL — SIGNIFICANT CHANGE UP (ref 2.5–4.5)
PHOSPHATE SERPL-MCNC: 3.5 MG/DL — SIGNIFICANT CHANGE UP (ref 2.5–4.5)
PLATELET # BLD AUTO: 214 K/UL — SIGNIFICANT CHANGE UP (ref 150–400)
POTASSIUM SERPL-MCNC: 4.9 MMOL/L — SIGNIFICANT CHANGE UP (ref 3.5–5.3)
POTASSIUM SERPL-MCNC: 5.2 MMOL/L — SIGNIFICANT CHANGE UP (ref 3.5–5.3)
POTASSIUM SERPL-SCNC: 4.9 MMOL/L — SIGNIFICANT CHANGE UP (ref 3.5–5.3)
POTASSIUM SERPL-SCNC: 5.2 MMOL/L — SIGNIFICANT CHANGE UP (ref 3.5–5.3)
PROT SERPL-MCNC: 6.3 G/DL — SIGNIFICANT CHANGE UP (ref 6–8.3)
PROT SERPL-MCNC: 6.4 G/DL — SIGNIFICANT CHANGE UP (ref 6–8.3)
RBC # BLD: 2.64 M/UL — LOW (ref 4.2–5.8)
RBC # FLD: 20.5 % — HIGH (ref 10.3–14.5)
SODIUM SERPL-SCNC: 130 MMOL/L — LOW (ref 135–145)
SODIUM SERPL-SCNC: 130 MMOL/L — LOW (ref 135–145)
VIRUS SPEC CULT: SIGNIFICANT CHANGE UP
WBC # BLD: 11.39 K/UL — HIGH (ref 3.8–10.5)
WBC # FLD AUTO: 11.39 K/UL — HIGH (ref 3.8–10.5)

## 2025-02-04 PROCEDURE — G0545: CPT

## 2025-02-04 PROCEDURE — 93010 ELECTROCARDIOGRAM REPORT: CPT

## 2025-02-04 PROCEDURE — 71045 X-RAY EXAM CHEST 1 VIEW: CPT | Mod: 26,76

## 2025-02-04 PROCEDURE — 99291 CRITICAL CARE FIRST HOUR: CPT | Mod: 25

## 2025-02-04 PROCEDURE — 99232 SBSQ HOSP IP/OBS MODERATE 35: CPT

## 2025-02-04 PROCEDURE — 31622 DX BRONCHOSCOPE/WASH: CPT

## 2025-02-04 RX ORDER — FENTANYL CITRATE-0.9 % NACL/PF 100MCG/2ML
50 SYRINGE (ML) INTRAVENOUS ONCE
Refills: 0 | Status: DISCONTINUED | OUTPATIENT
Start: 2025-02-04 | End: 2025-02-04

## 2025-02-04 RX ORDER — METOPROLOL SUCCINATE 50 MG/1
12.5 TABLET, EXTENDED RELEASE ORAL EVERY 12 HOURS
Refills: 0 | Status: DISCONTINUED | OUTPATIENT
Start: 2025-02-04 | End: 2025-02-10

## 2025-02-04 RX ORDER — AMIODARONE HYDROCHLORIDE 50 MG/ML
200 INJECTION, SOLUTION INTRAVENOUS DAILY
Refills: 0 | Status: DISCONTINUED | OUTPATIENT
Start: 2025-02-04 | End: 2025-03-04

## 2025-02-04 RX ORDER — PROPOFOL 10 MG/ML
50 INJECTION, EMULSION INTRAVENOUS ONCE
Refills: 0 | Status: COMPLETED | OUTPATIENT
Start: 2025-02-04 | End: 2025-02-04

## 2025-02-04 RX ORDER — METOPROLOL SUCCINATE 50 MG/1
2.5 TABLET, EXTENDED RELEASE ORAL ONCE
Refills: 0 | Status: COMPLETED | OUTPATIENT
Start: 2025-02-04 | End: 2025-02-04

## 2025-02-04 RX ADMIN — Medication 2.5 MILLIGRAM(S): at 23:14

## 2025-02-04 RX ADMIN — ACETYLCYSTEINE 4 MILLILITER(S): 200 INHALANT RESPIRATORY (INHALATION) at 11:26

## 2025-02-04 RX ADMIN — ATORVASTATIN CALCIUM 80 MILLIGRAM(S): 80 TABLET, FILM COATED ORAL at 21:30

## 2025-02-04 RX ADMIN — Medication 50 MILLIGRAM(S): at 21:30

## 2025-02-04 RX ADMIN — AMIODARONE HYDROCHLORIDE 200 MILLIGRAM(S): 50 INJECTION, SOLUTION INTRAVENOUS at 18:04

## 2025-02-04 RX ADMIN — HEPARIN SODIUM 5000 UNIT(S): 1000 INJECTION INTRAVENOUS; SUBCUTANEOUS at 14:45

## 2025-02-04 RX ADMIN — Medication 2.5 MILLIGRAM(S): at 11:26

## 2025-02-04 RX ADMIN — HEPARIN SODIUM 5000 UNIT(S): 1000 INJECTION INTRAVENOUS; SUBCUTANEOUS at 06:11

## 2025-02-04 RX ADMIN — POLYETHYLENE GLYCOL 3350 17 GRAM(S): 17 POWDER, FOR SOLUTION ORAL at 06:11

## 2025-02-04 RX ADMIN — Medication 1 APPLICATION(S): at 21:34

## 2025-02-04 RX ADMIN — Medication 5 MILLIGRAM(S): at 21:31

## 2025-02-04 RX ADMIN — Medication 1 SPRAY(S): at 17:40

## 2025-02-04 RX ADMIN — METOPROLOL SUCCINATE 2.5 MILLIGRAM(S): 50 TABLET, EXTENDED RELEASE ORAL at 14:46

## 2025-02-04 RX ADMIN — Medication 40 MILLIGRAM(S): at 11:58

## 2025-02-04 RX ADMIN — Medication 1 TABLET(S): at 11:59

## 2025-02-04 RX ADMIN — MIRTAZAPINE 7.5 MILLIGRAM(S): 30 TABLET, FILM COATED ORAL at 11:58

## 2025-02-04 RX ADMIN — Medication 50 MICROGRAM(S): at 10:30

## 2025-02-04 RX ADMIN — ACETYLCYSTEINE 4 MILLILITER(S): 200 INHALANT RESPIRATORY (INHALATION) at 23:14

## 2025-02-04 RX ADMIN — METOPROLOL SUCCINATE 12.5 MILLIGRAM(S): 50 TABLET, EXTENDED RELEASE ORAL at 18:04

## 2025-02-04 RX ADMIN — HEPARIN SODIUM 5000 UNIT(S): 1000 INJECTION INTRAVENOUS; SUBCUTANEOUS at 21:30

## 2025-02-04 RX ADMIN — Medication 81 MILLIGRAM(S): at 11:58

## 2025-02-04 RX ADMIN — Medication 2.5 MILLIGRAM(S): at 05:14

## 2025-02-04 RX ADMIN — INSULIN LISPRO 2: 100 INJECTION, SOLUTION INTRAVENOUS; SUBCUTANEOUS at 12:00

## 2025-02-04 RX ADMIN — PROPOFOL 50 MILLIGRAM(S): 10 INJECTION, EMULSION INTRAVENOUS at 11:23

## 2025-02-04 RX ADMIN — Medication 15 MILLILITER(S): at 17:42

## 2025-02-04 RX ADMIN — Medication 500 MILLIGRAM(S): at 11:58

## 2025-02-04 RX ADMIN — Medication 50 MICROGRAM(S): at 10:00

## 2025-02-04 RX ADMIN — ACETYLCYSTEINE 4 MILLILITER(S): 200 INHALANT RESPIRATORY (INHALATION) at 17:31

## 2025-02-04 RX ADMIN — ACETYLCYSTEINE 4 MILLILITER(S): 200 INHALANT RESPIRATORY (INHALATION) at 05:13

## 2025-02-04 RX ADMIN — METHOCARBAMOL 500 MILLIGRAM(S): 500 TABLET, FILM COATED ORAL at 21:30

## 2025-02-04 RX ADMIN — Medication 50 MICROGRAM(S): at 12:00

## 2025-02-04 RX ADMIN — Medication 2.5 MILLIGRAM(S): at 17:31

## 2025-02-04 RX ADMIN — METHOCARBAMOL 500 MILLIGRAM(S): 500 TABLET, FILM COATED ORAL at 14:45

## 2025-02-04 RX ADMIN — Medication 50 MICROGRAM(S): at 11:30

## 2025-02-04 RX ADMIN — METHOCARBAMOL 500 MILLIGRAM(S): 500 TABLET, FILM COATED ORAL at 06:11

## 2025-02-04 NOTE — PROCEDURE NOTE - NSBRONCHPROCDETAILS_GEN_A_CORE_FT
The disposable bronchoscope was passed through the ETT. The AUGUSTO, lingular and lower lobes was obstructed w/ thick mucous . This was aspirated and irrigated w/ saline and cleaned.   The RLL was noted to have moderate secretions which was irrigated and cleaned.   2nd look was perfiormed and all lobes were clean  At this point, scope was withdrawn  Patient tolerated the procedure well.   f/u CXR post bronch  PC 12/10//22//40% to recruit and [facility] later in he day.   PC at night

## 2025-02-04 NOTE — PROGRESS NOTE ADULT - ASSESSMENT
54M with HTN, HLD, ESRD on HD MWF via LUE AVF, ascites (requiring repeat paracenteses q4-6wks), NICM with moderate LV dysfunction w/ EF 35-40%() and CAD s/p atherectomy + SALLIE to D1 (2024) presents to [facility] 2024 as transfer from [facility] (via [facility]) where he presented  with SOB.   In [facility] ED, pt was hypoxic to 86% on RA, trop 1.7K, EKG no new changes, CXR fluid overload. Admitted for AHRF 2/2 fluid overload. Pt received HD on , ,  and . DAPT was resumed, TTE showed improvement in LVEF to 40-45%. Stress test was abnormal with 1mm inferior STD, reversible ischemia in the inferior wall and fixed defects in the lateral, anterior and apical walls with post stress EF of 24%. Pt was then transferred to [facility] for cardiac cath which showed pLAD 70% ISR, mLCx 70%, D1 severe dz. Patient now transferred to [facility] 2024 for CABG.       - underwent  C3L free LIMA-LAD; SVG-Diag; SVG-leftPL   - extubated   - hypotension and ECHO showing Systolic HF/depressed BIV Function, currently supported with /pressors.  Labs this evening showing transaminitis   - hypotensive, febrile and reintubated  1/3 - cultures (+) E coli +ESBL bacteremia   - underwent tracheostomy   - left lung collapse s/p bronchoscopy   - seen by plastic surgery / colorectal for sacral wound, no surgical intervention, no evidence of fistula, BC sent  - c/o right arm  pain, started on acyclovir for positive HSV PCR     doing a little better tissue culture with serratia; bronch also growing some yeast and VREC   back with nodule with some discharge    sacral decubitus less tender, some bloody secretions from trach  - still with drainage from back lesion    Recommendations  - completed linezolid course  -completed meropenem  - surgical f/u appreciated   - yeast in bronch, not clear if true infection, monitor off antifungal for now  - acyclovir completed  - check cultures from bronch today      Marla Champion M.D. ,   please reach via teams   If no answer, or after 5PM/ weekends,  then please call  [phone]    Assessment and plan discussed with the primary team .

## 2025-02-04 NOTE — PROGRESS NOTE ADULT - SUBJECTIVE AND OBJECTIVE BOX
Patient is a 54y old  Male who presents with a chief complaint of CAD s/p CABG (04 Feb 2025 07:14)    Being followed by ID for        Interval history:  No other acute events      ROS:  No cough,SOB,CP  No N/V/D  No abd pain  No urinary complaints  No HA  No joint or limb pain  No other complaints    PAST MEDICAL & SURGICAL HISTORY:  Type 2 diabetes mellitus      Hypertension      End stage renal disease  started HD 2/2019 T, Th, Sat via right chest permacath      Bone spur  right shoulder- hx of - sx done      Anemia      Injury of right wrist  hx of at age 14      History of hyperkalemia  before HD- K-6.2 - to repeat in am of sx, pt had HD today      S/P arthroscopy of right shoulder  2010      History of vascular access device  right chest permacath - 2/2019      H/O right wrist surgery  tendons repair - at age 14      AV fistula      H/O ventral hernia repair      S/P cholecystectomy        Allergies    No Known Allergies    Intolerances      Antimicrobials:      MEDICATIONS  (STANDING):  acetylcysteine 10%  Inhalation 4 milliLiter(s) Inhalation every 6 hours  albuterol    0.083% 2.5 milliGRAM(s) Nebulizer every 6 hours  ascorbic acid 500 milliGRAM(s) Oral daily  aspirin  chewable 81 milliGRAM(s) Oral daily  atorvastatin 80 milliGRAM(s) Oral at bedtime  chlorhexidine 0.12% Liquid 15 milliLiter(s) Oral Mucosa every 12 hours  chlorhexidine 2% Cloths 1 Application(s) Topical daily  chlorhexidine 4% Liquid 1 Application(s) Topical <User Schedule>  dextrose 50% Injectable 50 milliLiter(s) IV Push every 15 minutes  epoetin ignacia (EPOGEN) Injectable 82007 Unit(s) IV Push <User Schedule>  fentaNYL    Injectable 50 MICROGram(s) IV Push once  heparin   Injectable 5000 Unit(s) SubCutaneous every 8 hours  insulin lispro (ADMELOG) corrective regimen sliding scale   SubCutaneous every 6 hours  melatonin 5 milliGRAM(s) Oral at bedtime  methocarbamol 500 milliGRAM(s) Oral every 8 hours  mirtazapine 7.5 milliGRAM(s) Oral daily  Nephro-elicia 1 Tablet(s) Oral daily  pantoprazole  Injectable 40 milliGRAM(s) IV Push daily  polyethylene glycol 3350 17 Gram(s) Oral two times a day  pregabalin 50 milliGRAM(s) Oral every 12 hours  propofol Injectable 50 milliGRAM(s) IV Push once  sodium chloride 0.65% Nasal 1 Spray(s) Both Nostrils every 12 hours  sodium chloride 0.9%. 1000 milliLiter(s) (10 mL/Hr) IV Continuous <Continuous>  traZODone 50 milliGRAM(s) Oral at bedtime      Vital Signs Last 24 Hrs  T(C): 37.4 (02-04-25 @ 08:00), Max: 37.4 (02-04-25 @ 08:00)  T(F): 99.4 (02-04-25 @ 08:00), Max: 99.4 (02-04-25 @ 08:00)  HR: 122 (02-04-25 @ 08:42) (65 - 122)  BP: 141/80 (02-04-25 @ 08:00) (98/79 - 169/80)  BP(mean): 103 (02-04-25 @ 08:00) (78 - 109)  RR: 22 (02-04-25 @ 08:10) (12 - 42)  SpO2: 99% (02-04-25 @ 08:42) (90% - 100%)    Physical Exam:    Constitutional well preserved,comfortable,pleasant    HEENT PERRLA EOMI,No pallor or icterus    No oral exudate or erythema    Neck supple no JVD or LN    Chest Good AE,CTA    CVS RRR S1 S2 WNl No murmur or rub or gallop    Abd soft BS normal No tenderness no masses    Ext No cyanosis clubbing or edema    IV site no erythema tenderness or discharge    Joints no swelling or LOM    CNS AAO X 3 no focal    Lab Data:                          8.0    11.39 )-----------( 214      ( 04 Feb 2025 00:31 )             25.1       02-04    130[L]  |  91[L]  |  26[H]  ----------------------------<  93  4.9   |  26  |  4.12[H]    Ca    8.7      04 Feb 2025 00:29  Phos  3.5     02-04  Mg     2.3     02-04    TPro  6.3  /  Alb  2.8[L]  /  TBili  0.4  /  DBili  x   /  AST  15  /  ALT  9[L]  /  AlkPhos  118  02-04        Drainage  02-01-25   Growth in fluid media only Staphylococcus aureus  Growth in fluid media only Serratia marcescens  --  --      Bronchial  01-30-25   Commensal manjit consistent with body site  --    Few polymorphonuclear leukocytes seen per low power field  Few Squamous epithelial cells seen per low power field  Few Yeast like cells seen per oil power field      Bronchial  01-28-25   Commensal manjit consistent with body site  --    Moderate polymorphonuclear leukocytes per low power field  Numerous Squamous epithelial cells per low power field  Few Yeast like cells per oil power field                    WBC Count: 11.39 (02-04-25 @ 00:31)  WBC Count: 10.02 (02-03-25 @ 00:20)  WBC Count: 9.68 (02-02-25 @ 00:21)  WBC Count: 11.02 (02-01-25 @ 00:42)  WBC Count: 9.40 (01-31-25 @ 00:30)  WBC Count: 8.96 (01-30-25 @ 00:24)  WBC Count: 7.37 (01-29-25 @ 00:58)       Bilirubin Total: 0.4 mg/dL (02-04-25 @ 00:29)  Aspartate Aminotransferase (AST/SGOT): 15 U/L (02-04-25 @ 00:29)  Alanine Aminotransferase (ALT/SGPT): 9 U/L (02-04-25 @ 00:29)  Alkaline Phosphatase: 118 U/L (02-04-25 @ 00:29)  Bilirubin Total: 0.4 mg/dL (02-03-25 @ 00:20)  Aspartate Aminotransferase (AST/SGOT): 16 U/L (02-03-25 @ 00:20)  Alanine Aminotransferase (ALT/SGPT): 10 U/L (02-03-25 @ 00:20)  Alkaline Phosphatase: 122 U/L (02-03-25 @ 00:20)  Bilirubin Total: 0.5 mg/dL (02-02-25 @ 00:27)  Aspartate Aminotransferase (AST/SGOT): 20 U/L (02-02-25 @ 00:27)  Alanine Aminotransferase (ALT/SGPT): 10 U/L (02-02-25 @ 00:27)  Alkaline Phosphatase: 125 U/L (02-02-25 @ 00:27)  Bilirubin Total: 0.5 mg/dL (02-01-25 @ 00:42)  Aspartate Aminotransferase (AST/SGOT): 14 U/L (02-01-25 @ 00:42)  Alanine Aminotransferase (ALT/SGPT): 11 U/L (02-01-25 @ 00:42)  Alkaline Phosphatase: 121 U/L (02-01-25 @ 00:42)  Bilirubin Total: 0.5 mg/dL (01-31-25 @ 00:30)  Aspartate Aminotransferase (AST/SGOT): 16 U/L (01-31-25 @ 00:30)  Alanine Aminotransferase (ALT/SGPT): 10 U/L (01-31-25 @ 00:30)  Alkaline Phosphatase: 123 U/L (01-31-25 @ 00:30)         Patient is a 54y old  Male who presents with a chief complaint of CAD s/p CABG (04 Feb 2025 07:14)    Being followed by ID for        Interval history:  pt to get a bronchoscopy for left lung collapse  pt awake and alert  now off abs  events reviewed   No other acute events      PAST MEDICAL & SURGICAL HISTORY:  Type 2 diabetes mellitus      Hypertension      End stage renal disease  started HD 2/2019 T, Th, Sat via right chest permacath      Bone spur  right shoulder- hx of - sx done      Anemia      Injury of right wrist  hx of at age 14      History of hyperkalemia  before HD- K-6.2 - to repeat in am of sx, pt had HD today      S/P arthroscopy of right shoulder  2010      History of vascular access device  right chest permacath - 2/2019      H/O right wrist surgery  tendons repair - at age 14      AV fistula      H/O ventral hernia repair      S/P cholecystectomy        Allergies    No Known Allergies    Intolerances      Antimicrobials:      MEDICATIONS  (STANDING):  acetylcysteine 10%  Inhalation 4 milliLiter(s) Inhalation every 6 hours  albuterol    0.083% 2.5 milliGRAM(s) Nebulizer every 6 hours  ascorbic acid 500 milliGRAM(s) Oral daily  aspirin  chewable 81 milliGRAM(s) Oral daily  atorvastatin 80 milliGRAM(s) Oral at bedtime  chlorhexidine 0.12% Liquid 15 milliLiter(s) Oral Mucosa every 12 hours  chlorhexidine 2% Cloths 1 Application(s) Topical daily  chlorhexidine 4% Liquid 1 Application(s) Topical <User Schedule>  dextrose 50% Injectable 50 milliLiter(s) IV Push every 15 minutes  epoetin ignacia (EPOGEN) Injectable 03131 Unit(s) IV Push <User Schedule>  fentaNYL    Injectable 50 MICROGram(s) IV Push once  heparin   Injectable 5000 Unit(s) SubCutaneous every 8 hours  insulin lispro (ADMELOG) corrective regimen sliding scale   SubCutaneous every 6 hours  melatonin 5 milliGRAM(s) Oral at bedtime  methocarbamol 500 milliGRAM(s) Oral every 8 hours  mirtazapine 7.5 milliGRAM(s) Oral daily  Nephro-elicia 1 Tablet(s) Oral daily  pantoprazole  Injectable 40 milliGRAM(s) IV Push daily  polyethylene glycol 3350 17 Gram(s) Oral two times a day  pregabalin 50 milliGRAM(s) Oral every 12 hours  propofol Injectable 50 milliGRAM(s) IV Push once  sodium chloride 0.65% Nasal 1 Spray(s) Both Nostrils every 12 hours  sodium chloride 0.9%. 1000 milliLiter(s) (10 mL/Hr) IV Continuous <Continuous>  traZODone 50 milliGRAM(s) Oral at bedtime      Vital Signs Last 24 Hrs  T(C): 37.4 (02-04-25 @ 08:00), Max: 37.4 (02-04-25 @ 08:00)  T(F): 99.4 (02-04-25 @ 08:00), Max: 99.4 (02-04-25 @ 08:00)  HR: 122 (02-04-25 @ 08:42) (65 - 122)  BP: 141/80 (02-04-25 @ 08:00) (98/79 - 169/80)  BP(mean): 103 (02-04-25 @ 08:00) (78 - 109)  RR: 22 (02-04-25 @ 08:10) (12 - 42)  SpO2: 99% (02-04-25 @ 08:42) (90% - 100%)    Physical Exam:    Constitutional well preserved,comfortable,pleasant    HEENT PERRLA EOMI,No pallor or icterus    No oral exudate or erythema    Neck supple no JVD or LN    Chest Good AE,CTA    CVS  S1 S2     Abd soft BS normal No tenderness     back lesion much improved     Ext No cyanosis clubbing or edema    IV site no erythema tenderness or discharge    Joints no swelling or LOM    CNS AAO X 3 no focal    Lab Data:                          8.0    11.39 )-----------( 214      ( 04 Feb 2025 00:31 )             25.1       02-04    130[L]  |  91[L]  |  26[H]  ----------------------------<  93  4.9   |  26  |  4.12[H]    Ca    8.7      04 Feb 2025 00:29  Phos  3.5     02-04  Mg     2.3     02-04    TPro  6.3  /  Alb  2.8[L]  /  TBili  0.4  /  DBili  x   /  AST  15  /  ALT  9[L]  /  AlkPhos  118  02-04        Drainage  02-01-25   Growth in fluid media only Staphylococcus aureus  Growth in fluid media only Serratia marcescens  --  --      Bronchial  01-30-25   Commensal manjit consistent with body site  --    Few polymorphonuclear leukocytes seen per low power field  Few Squamous epithelial cells seen per low power field  Few Yeast like cells seen per oil power field      Bronchial  01-28-25   Commensal manjit consistent with body site  --    Moderate polymorphonuclear leukocytes per low power field  Numerous Squamous epithelial cells per low power field  Few Yeast like cells per oil power field        WBC Count: 11.39 (02-04-25 @ 00:31)  WBC Count: 10.02 (02-03-25 @ 00:20)  WBC Count: 9.68 (02-02-25 @ 00:21)  WBC Count: 11.02 (02-01-25 @ 00:42)  WBC Count: 9.40 (01-31-25 @ 00:30)  WBC Count: 8.96 (01-30-25 @ 00:24)  WBC Count: 7.37 (01-29-25 @ 00:58)       Bilirubin Total: 0.4 mg/dL (02-04-25 @ 00:29)  Aspartate Aminotransferase (AST/SGOT): 15 U/L (02-04-25 @ 00:29)  Alanine Aminotransferase (ALT/SGPT): 9 U/L (02-04-25 @ 00:29)  Alkaline Phosphatase: 118 U/L (02-04-25 @ 00:29)    Bilirubin Total: 0.4 mg/dL (02-03-25 @ 00:20)  Aspartate Aminotransferase (AST/SGOT): 16 U/L (02-03-25 @ 00:20)  Alanine Aminotransferase (ALT/SGPT): 10 U/L (02-03-25 @ 00:20)  Alkaline Phosphatase: 122 U/L (02-03-25 @ 00:20)

## 2025-02-04 NOTE — PROGRESS NOTE ADULT - SUBJECTIVE AND OBJECTIVE BOX
Patient seen and examined at the bedside.      Brief Summary:  53 yo M with multiple medical problems including CAD s/p PCI in 2024 s/p CABG x 3 on 24  1: Intubated for hypoxia & left lung white out s/p awake bronch with removal of copious mucopurulent secretions  : s/p IABP insertion, sepsis/septic shock due to E coli   ESBL pneumonia, collapsed Left Lung, s/p multiple bronch    tracheostomy tube placement    VRE E. Faecium Bcx   +HSV PCR BAL   tissue cx from back abscess - Serratia   - - intermittent bradycardia/junctional episodes with hypotension  2/3: off , off theophylline. iHD 1L UF  : bronch for Left lung collapse wound vac    24 Hour events:  2decho w/ moderate pericardial effusion - similar as before  US abd: small - moderate ascites - monitor at this time  CXR w/ Left lung collapse s/p bronch today. HFNC during day and PC at nightg  iHD yesterday and 1L HD, MWF  Wound vac placed for sacral wound today    Objective:  Vital Signs Last 24 Hrs  T(C): 37.2 (2025 00:00), Max: 37.3 (2025 20:00)  T(F): 98.9 (2025 00:00), Max: 99.1 (2025 20:00)  HR: 119 (2025 06:00) (65 - 121)  BP: 140/78 (2025 06:00) (98/79 - 169/80)  BP(mean): 101 (2025 06:00) (78 - 109)  RR: 22 (2025 06:00) (12 - 42)  SpO2: 100% (2025 06:00) (95% - 100%)    Parameters below as of 2025 05:35  Patient On (Oxygen Delivery Method): pt is on HFTC  O2 Flow (L/min): 40  O2 Concentration (%): 50    Mode: AC/ CMV (Assist Control/ Continuous Mandatory Ventilation)  RR (machine): 22  FiO2: 40  PEEP: 10  ITime: 1  MAP: 13  PC: 12  PIP: 24    Physical Exam:  General: Alert and interactive,   Neurology: alert awae  Respiratory: dec breath sounds at bases  CV: s1, s2+  Abdominal: Soft, Nontender  Extremities: Warm, well-perfused  Back: open abscess/wound to right paralumbar area, indurated, no fluctuance, no crepitus; sacral wound   Right arm tender, but non-infectious appearing  -------------------------------------------------------------------------------------------------------------------------------    Labs:                        8.0    11.39 )-----------( 214      ( 2025 00:31 )             25.1     02-04    130[L]  |  91[L]  |  26[H]  ----------------------------<  93  4.9   |  26  |  4.12[H]    Ca    8.7      2025 00:29  Phos  3.5     02-04  Mg     2.3     02-04    TPro  6.3  /  Alb  2.8[L]  /  TBili  0.4  /  DBili  x   /  AST  15  /  ALT  9[L]  /  AlkPhos  118  02-04    LIVER FUNCTIONS - ( 2025 00:29 )  Alb: 2.8 g/dL / Pro: 6.3 g/dL / ALK PHOS: 118 U/L / ALT: 9 U/L / AST: 15 U/L / GGT: x           PT/INR - ( 2025 00:20 )   PT: 13.4 sec;   INR: 1.18 ratio       PTT - ( 2025 00:20 )  PTT:37.5 sec    ALL MEDICATIONS   MEDICATIONS  (STANDING):  acetylcysteine 10%  Inhalation 4 milliLiter(s) Inhalation every 6 hours  albuterol    0.083% 2.5 milliGRAM(s) Nebulizer every 6 hours  ascorbic acid 500 milliGRAM(s) Oral daily  aspirin  chewable 81 milliGRAM(s) Oral daily  atorvastatin 80 milliGRAM(s) Oral at bedtime  chlorhexidine 0.12% Liquid 15 milliLiter(s) Oral Mucosa every 12 hours  chlorhexidine 2% Cloths 1 Application(s) Topical daily  chlorhexidine 4% Liquid 1 Application(s) Topical <User Schedule>  dextrose 50% Injectable 50 milliLiter(s) IV Push every 15 minutes  epoetin ignacia (EPOGEN) Injectable 87877 Unit(s) IV Push <User Schedule>  heparin   Injectable 5000 Unit(s) SubCutaneous every 8 hours  insulin lispro (ADMELOG) corrective regimen sliding scale   SubCutaneous every 6 hours  melatonin 5 milliGRAM(s) Oral at bedtime  methocarbamol 500 milliGRAM(s) Oral every 8 hours  mirtazapine 7.5 milliGRAM(s) Oral daily  Nephro-elicia 1 Tablet(s) Oral daily  pantoprazole  Injectable 40 milliGRAM(s) IV Push daily  polyethylene glycol 3350 17 Gram(s) Oral two times a day  pregabalin 50 milliGRAM(s) Oral every 12 hours  sodium chloride 0.65% Nasal 1 Spray(s) Both Nostrils every 12 hours  sodium chloride 0.9%. 1000 milliLiter(s) (10 mL/Hr) IV Continuous <Continuous>  traZODone 50 milliGRAM(s) Oral at bedtime    MEDICATIONS  (PRN):  acetaminophen     Tablet .. 650 milliGRAM(s) Oral every 6 hours PRN Mild Pain (1 - 3)  lidocaine/prilocaine Cream 1 Application(s) Topical daily PRN pre-HD  oxyCODONE    IR 5 milliGRAM(s) Oral every 4 hours PRN Moderate Pain (4 - 6)  sodium chloride 0.9% lock flush 10 milliLiter(s) IV Push every 1 hour PRN Pre/post blood products, medications, blood draw, and to maintain line patency  ------------------------------------------------------------------------------------------------------------------------------  Assessment:  53 yo M s/p CABG x 3 on 24    Postop acute respiratory failure  Acute blood loss anemia  Ascites    ESRD on iHD   E coli bacteremia  pneumonia  VRE E. Faecium   HSV infection  Tracheomalacia     Plan:  ***Neuro***  Postoperative acute pain control with Robaxin, Tylenol, and Oxycodone prn  Trazodone and Melatonin nightly  PT ongoing    ***Cardiovascular***  s/p CABG x 3  A fib s/p cardioversion  Intermittent bradycardia/junction with hypotension - off Theophyline   TTE  LVEF 60%, mod RA pericardial effusion   ASA / Statin daily  2decho to assess for pericardial effusion    ***Pulmonary***  Postoperative acute respiratory failure  Tracheostomy    s/p Bronchoscopy . , , ,   Mucomyst albuterol  Left lung PNA, + e coli ESBL - cleared but now with VRE   Tolerates trach collar with high flow, keep on a vent at night  On HT 3% nebs and albuterol  Bronchoscopy by IP on  - no concerns for tracheomalacia  s/p bronch for  for Left lung collapse    ***GI***  On Tube feeds at goal, change to vital per nutrition consult  Protonix for stress ulcer prophylaxis.   Ascites: Last paracentesis , monitor     ***Renal***  ESRD -HD as scheduled    ***ID***  Monitor cultures   BAL - commensal manjit    VRE BAL - Linezolid    Acyclovir for + HSV in BAL   serratia on tissue cx and h/o ESBL PNA - Meropenem until   monitor off antibiotics    ***Endocrine***  Hyperglycemia - Insulin sliding scale    ***Hematology***  Acute blood loss anemia  CBC, coags  Continue Epogen.  Heparin SQ for DVT  No AC s/p cardioversion. not on AC due to moderate pericardial effusion

## 2025-02-04 NOTE — PROGRESS NOTE ADULT - ASSESSMENT
54M with PMH of HTN, HLD, ESRD on HD MWF via LUE AVF, ascites (requiring repeat paracenteses q4-6wks), NICM with moderate LV dysfunction w/ EF 35-40%() and CAD s/p atherectomy + SALLIE to D1(2024) presents to [facility] transferred from [facility]. Patient [facility] ED with shortness of breath. Of note he was recently admitted from - for acute cholecystitis s/p cholecystectomy. In Critical [facility] hospital ED, pt was hypoxic to 86% on RA, trop 1.7K, EKG no new changes, CXR fluid overload. Admitted for AHRF 2/2 fluid overload. Pt received HD on , ,  and . DAPT was resumed, TTE showed improvement in LVEF to 40-45%. Stress test was abnormal with 1mm inferior STD, reversible ischemia in the inferior wall and fixed defects in the lateral, anterior and apical walls with post stress EF of 24%.     Pt was then transferred to [facility] for cardiac cath which showed pLAD 70% ISR, mLCx 70%, D1 severe dz. Patient now transferred to [facility] CTS Dr. Rivers for CABG eval.# CAD s/p NSTEMI  Hx CAD s/p PCI LAD   Presented with ADHF elevated troponins  Positive NST1-Cath- pLAD 70% ISR, mLCx 70%, D1 severe dz.   TTE EF 35% Severe TRWas on Plavix-p2y12- 321 been off Plavix since -POD#1-C3L free LIMA-LAD; SVG-Diag; NBO-spydKN-Vfxpotdgq on  Sinus rhythm,Amio for AF prophylaxis  -OOB off  Sinus rhythm   -POD#3-On /pressors-EF 25-30% RV failure with transaminitis-Sinus Qqkwxx30/03-POD#4-Was intubated for hypoxia-gradual hypotension on /pressors had IABP inserted this morning  -POD#5-s/p IABP insertion, sepsis/septic shock due to GNR/ECOLI HD cath placed   Bronched yesterday 1/3 - minimal secretions with rust-tinged sputum   ESBL-E Coli bacteremia on meropenam    - POD#7 Atrial Fib ,remains intubated weaning IABP 1:3,off pressors on CRRT  -IABP removed.Remains on vent-Alex 10ppm,off Levo- CVP 10 / MAP 71 / Hct 25.6% / Lactate 1.7-Atrial Fibrillation  -Intubated on Alex 10ppm, gtt, BP better-AF~~90's on Amio-had bronch still febrile Tmax 90373/-Extubated awake alert on HFNC AF,on Dobutrex-CRRT,Cultures no growth    01/10-OOB on BiPAP Sinus Rhythm on -SR/ MAP 57/ CVP 10/  Hct 26.7 / Lact 1.4  -s/p EDU/DCCV-Sinus rhythm on -SR / MAP 52 / CVP 12/ Hct 25.8 / Lact 0.7-had bronch with BAL Left lung  -Sinus rhythm on -for repeat Bronch-SR / MAP 67 / CVP 11 / Hct 27.4% / Lact 0.8  -s/p Trach on  TTE EF 55%-SR / CVP 2 / MAP 63 / Hct 25.9% / Lactate 0.9  -Awake on Trach collar off  c/o buttock pain had bronch yesterday-BAL-Sinus rhythm  -Sinus rhythm on vent at night-BP stable may need to increase hydrallazine 25 mg q8  -TTE EF 60% still needs Vent support at night Bradycardic trial of Bryce now back on   -Awake alert Sinus rhythm BP elevated  remains on ,Nocturnal vent support  resume Hydralazine 25mg q8  -Reverted to AF rvr  -s/p BRonc/BAL debridement sacral decub  -Awake noted AF RVR no further bradyarrhythmias-Start BBlockers      # Acute on Chronic Systolic Heart Failure now improved  EF-35% ,severe TR  Had HD post op  GDMT post op  LVEF improved post bypass but now at 23-30%-BiV dysfunction on  now off pressors  -TTE EF 40%,trace effusion  ECG c/w pericarditis-was on colchicine   01/15-TTE EF 55%  -TTE EF 60%   -Normal LV systolic function Moderate pericardial effusion    Vascular evaluated  AVGraft /?steal causing RV failure-'' Very low suspicion of steal Sd and AVF causing current clinical state. ''   VA Duplex Hemodialysis Access, Left (25 @ 13:35) >   Arterial flow volume of 1301 mL/m, and the venous flow volume of  1492 mL/m are consistent with a normally functioning dialysis access  fistula.      # Ascites  Hx chronic ascites  Has drainage q4-6 weeks  Last drained -4600mL  ? ascites related to severe TR  Had wahtyqehmffm-Zalwivf-oo growth   CT Abdomen 01/10-Large volume ascites-  US abdomen--Small to moderate volume abdominal/pelvic ascites      # ESRD  Now off CRRT transition to HD

## 2025-02-04 NOTE — PROGRESS NOTE ADULT - ASSESSMENT
54M with PMH of HTN, HLD, ESRD on HD MWF via LUE AVF, ascites (requiring repeat paracenteses q4-6wks), NICM with moderate LV dysfunction w/ EF 35-40%(2023) and CAD s/p atherectomy + SALLIE to D1(4/11/2024) presents to [facility] transferred from [facility] for CABG eval  Nephro on [facility] for HD    ESRD on HD   Regular schedule MWF   Access LUE AVF  [facility]  Nephrologist Dr. Bailey  S/p HD on 01/29, 2/3  HD tomorrow   Keep MAP>65  Renal Diet  Consent in physical chart    Hyper/Hypokalemia  Monitor K, will change dialysate fluid as needed    HTN  bp fluctuating; acceptable  monitor bp     CKD MBD  Can send a PTH  Ca and Phos daily    Anemia  CRISTIANE with HD  Transfuse if hgb <7    CAD with multivessel disease  s/p CABG 12/30  CTICU following    Hypoxic Resp failure requiring Trach  Per surgery  S/p bronchoscopy, pulm following

## 2025-02-04 NOTE — PROGRESS NOTE ADULT - SUBJECTIVE AND OBJECTIVE BOX
MR#[id number]  PATIENT NAME:RAAD CANO    DATE OF SERVICE: 25 @ 07:15  Patient was seen and examined by Solo Quick MD on    25 @ 07:15 .  Interim events noted.Consultant notes ,Labs,Telemetry reviewed by me       HOSPITAL COURSE: HPI:  54M with PMH of HTN, HLD, ESRD on HD MWF via LUE AVF, ascites (requiring repeat paracenteses q4-6wks), NICM with moderate LV dysfunction w/ EF 35-40%() and CAD s/p atherectomy + SALLIE to D1(2024) presents to [facility] transferred from [facility]. Patient initially presented to [facility] ED with shortness of breath. Of note he was recently admitted from - for acute cholecystitis s/p cholecystectomy. In [facility] ED, pt was hypoxic to 86% on RA, trop 1.7K, EKG no new changes, CXR fluid overload. Admitted for AHRF 2/2 fluid overload. Pt received HD on , ,  and . DAPT was resumed, TTE showed improvement in LVEF to 40-45%. Stress test was abnormal with 1mm inferior STD, reversible ischemia in the inferior wall and fixed defects in the lateral, anterior and apical walls with post stress EF of 24%. Pt was then transferred to [facility] for cardiac cath which showed pLAD 70% ISR, mLCx 70%, D1 severe dz. Patient now transferred to [facility] CTS Dr. Rivers for CABG eval. Patient denies any current SOB or chest pain on admission. Otherwise denies headaches, abdominal pain, urinary or bowel changes, fevers, chills, N/V/D, sick contacts.  (24 Dec 2024 05:07)        INTERIM EVENTS:Patient seen at bedside ,interim events noted.   Cardiac cath  pLAD 70% ISR, mLCx 70%, D1 severe dz.   -POD#1-C3L free LIMA-LAD; SVG-Diag; SVG-leftPL Awake OOB extubated Sinus rhythm on Dobutamine gtt  - Was intubated last night for airway protection s/p bronchoscopy This morning had IABP inserted  remains sedated intubated Sinus Rhythm  - s/p IABP insertion, sepsis/septic shock due to GNR/ECOLI -Paracentesis for suspected bacterial peritonitis-no growth  -Awake on nocturnal vent support-Sinus rhythm-had been seen by Surgery for sacral wound  -Awake on Vent at nights noted bradycardic put back on  seen by EP  -Awake c/o buttock pain remains on Nocturnal Vent on  and Theophyline   -Awake AF rvr   -Had bronch-s/p I&D of back abscess-s/p bedside debridement of sacral decubitus ulcer   -Awake alert on T collar-TC during the day and PC: 12/10//15/50% at night-interrmittent rapid AF noted            MEDICATIONS  (STANDING):  acetylcysteine 10%  Inhalation 4 milliLiter(s) Inhalation every 6 hours  albuterol    0.083% 2.5 milliGRAM(s) Nebulizer every 6 hours  ascorbic acid 500 milliGRAM(s) Oral daily  aspirin  chewable 81 milliGRAM(s) Oral daily  atorvastatin 80 milliGRAM(s) Oral at bedtime  chlorhexidine 0.12% Liquid 15 milliLiter(s) Oral Mucosa every 12 hours  chlorhexidine 2% Cloths 1 Application(s) Topical daily  chlorhexidine 4% Liquid 1 Application(s) Topical <User Schedule>  dextrose 50% Injectable 50 milliLiter(s) IV Push every 15 minutes  epoetin ignacia (EPOGEN) Injectable 20238 Unit(s) IV Push <User Schedule>  heparin   Injectable 5000 Unit(s) SubCutaneous every 8 hours  insulin lispro (ADMELOG) corrective regimen sliding scale   SubCutaneous every 6 hours  melatonin 5 milliGRAM(s) Oral at bedtime  methocarbamol 500 milliGRAM(s) Oral every 8 hours  mirtazapine 7.5 milliGRAM(s) Oral daily  Nephro-elicia 1 Tablet(s) Oral daily  pantoprazole  Injectable 40 milliGRAM(s) IV Push daily  polyethylene glycol 3350 17 Gram(s) Oral two times a day  pregabalin 50 milliGRAM(s) Oral every 12 hours  sodium chloride 0.65% Nasal 1 Spray(s) Both Nostrils every 12 hours  sodium chloride 0.9%. 1000 milliLiter(s) (10 mL/Hr) IV Continuous <Continuous>  traZODone 50 milliGRAM(s) Oral at bedtime    MEDICATIONS  (PRN):  acetaminophen     Tablet .. 650 milliGRAM(s) Oral every 6 hours PRN Mild Pain (1 - 3)  lidocaine/prilocaine Cream 1 Application(s) Topical daily PRN pre-HD  oxyCODONE    IR 5 milliGRAM(s) Oral every 4 hours PRN Moderate Pain (4 - 6)  sodium chloride 0.9% lock flush 10 milliLiter(s) IV Push every 1 hour PRN Pre/post blood products, medications, blood draw, and to maintain line patency            REVIEW OF SYSTEMS:  Constitutional: [ ] fever, [ ]weight loss,  [x ]fatigue [ ]weight gain  Eyes: [ ] visual changes  Respiratory: [ ]shortness of breath;  [ ] cough, [ ]wheezing, [ ]chills, [ ]hemoptysis  Cardiovascular: [ ] chest pain, [ ]palpitations, [ ]dizziness,  [ ]leg swelling[ ]orthopnea[ ]PND  Gastrointestinal: [ ] abdominal pain, [ ]nausea, [ ]vomiting,  [ ]diarrhea [ ]Constipation [ ]Melena  Genitourinary: [ ] dysuria, [ ] hematuria [ ]Vigil  Neurologic: [ ] headaches [ ] tremors[ ]weakness [ ]Paralysis Right[ ] Left[ ]  Skin: [ ] itching, [ ]burning, [ ] rashes  Endocrine: [ ] heat or cold intolerance  Musculoskeletal: [ ] joint pain or swelling; [ ] muscle, back, or extremity pain  Psychiatric: [ ] depression, [ ]anxiety, [ ]mood swings, or [ ]difficulty sleeping  Hematologic: [ ] easy bruising, [ ] bleeding gums    [ ] All remaining systems negative except as per above.   [ ]Unable to obtain.  [x] No change in ROS since admission      Vital Signs Last 24 Hrs  T(C): 37.2 (2025 00:00), Max: 37.3 (2025 20:00)  T(F): 98.9 (2025 00:00), Max: 99.1 (2025 20:00)  HR: 119 (2025 06:00) (65 - 121)  BP: 140/78 (2025 06:00) (98/79 - 169/80)  BP(mean): 101 (2025 06:00) (78 - 109)  RR: 22 (2025 06:00) (12 - 42)  SpO2: 100% (2025 06:00) (95% - 100%)    Parameters below as of 2025 05:35  Patient On (Oxygen Delivery Method): pt is on HFTC  O2 Flow (L/min): 40  O2 Concentration (%): 50  I&O's Summary    2025 07:01  -  2025 07:00  --------------------------------------------------------  IN: 2395 mL / OUT: 1000 mL / NET: 1395 mL        PHYSICAL EXAM:  General: No acute distress BMI-28  HEENT: EOMI, PERRL  Neck: Supple, [ ] JVD  Lungs: Equal air entry bilaterally; [ ] rales [ ] wheezing [ ] rhonchi  Heart: Regular rate and rhythm; [x ] murmur   2/6 [ x] systolic [ ] diastolic [ ] radiation[ ] rubs [ ]  gallops  Abdomen: Nontender, bowel sounds present  Extremities: No clubbing, cyanosis, [ ] edema [ ]Pulses  equal and intact  Nervous system:  Alert & Oriented X3, no focal deficits  Psychiatric: Normal affect  Skin: No rashes or lesions    LABS:      130[L]  |  91[L]  |  26[H]  ----------------------------<  93  4.9   |  26  |  4.12[H]    Ca    8.7      2025 00:29  Phos  3.5     02-04  Mg     2.3     02-04    TPro  6.3  /  Alb  2.8[L]  /  TBili  0.4  /  DBili  x   /  AST  15  /  ALT  9[L]  /  AlkPhos  118  02-04    Creatinine Trend: 4.12<--, 5.74<--, 5.38<--, 4.78<--, 3.78<--, 5.05<--                        8.0    11.39 )-----------( 214      ( 2025 00:31 )             25.1     PT/INR - ( 2025 00:20 )   PT: 13.4 sec;   INR: 1.18 ratio         PTT - ( 2025 00:20 )  PTT:37.5 sec      US Abdomen Limited (25 @ 16:52) >    IMPRESSION:  Small to moderate volume abdominal/pelvic ascites as above.  Bilateral pleural effusions.       Xray Chest 1 View- PORTABLE-Routine (Xray Chest 1 View- PORTABLE-Routine in AM.) (25 @ 07:03) >  IMPRESSION:  Mild pulmonary edema and small left effusion.       TTE Limited W or WO Ultrasound Enhancing Agent (25 @ 15:31) >  CONCLUSIONS:      1.Left ventricular cavity is normal in size. Left ventricular systolic function is normal.   2. Severely enlarged right ventricular cavity size and reduced right ventricular systolic function.   3. Moderate pericardial effusion noted adjacent to the right atrium and small pericardial effusion noted adjacent to the anterior right ventricle with no echocardiographic evidence of tamponade physiology: evidence of pericardial constriction with septal bounce.   4. The inferior vena cava is dilated measuring 2.30 cm in diameter, (dilated >2.1cm) with abnormal inspiratory collapse (abnormal <50%) consistent with elevated right atrial pressure (~15, range 10-20mmHg).

## 2025-02-04 NOTE — PROGRESS NOTE ADULT - SUBJECTIVE AND OBJECTIVE BOX
Patient seen and examined at the bedside.    Remains critically ill on continuous ICU monitoring, at risk for life threatening decompensation.  All Labs, data reviewed. Plan of care discussed in length during multi-disciplinary ICU rounds.       Brief Summary:  53 yo M with multiple medical problems including CAD s/p PCI in 2024 s/p CABG x 3 on 24  1: Intubated for hypoxia & left lung white out s/p awake bronch with removal of copious mucopurulent secretions  : s/p IABP insertion, sepsis/septic shock due to E coli   ESBL pneumonia, collapsed Left Lung, s/p multiple bronch    tracheostomy tube placement    VRE E. Faecium Bcx   +HSV PCR BAL   tissue cx from back abscess - Serratia   - - intermittent bradycardia/junctional episodes with hypotension    24 Hour events:  TC during the day and PC: 12/10//15//50% at night  Overnight Afib w/ HR in 100's, off /theophylline  Did not have recurrent bradyarrhythmia   HyperK and HD yesterday  s/p sacral debridement: f/u wound care and need for Wound Vac  ID recs for EOT for ABTs  Not on AC due to moderate pericardial effusion - 2decho yesterday to assess pericardial effusion    Objective:  Vital Signs Last 24 Hrs  T(C): 37.2 (2025 00:00), Max: 37.3 (2025 20:00)  T(F): 98.9 (2025 00:00), Max: 99.1 (2025 20:00)  HR: 119 (2025 06:00) (65 - 121)  BP: 140/78 (2025 06:00) (98/79 - 169/80)  BP(mean): 101 (2025 06:00) (78 - 109)  RR: 22 (2025 06:00) (12 - 42)  SpO2: 100% (2025 06:00) (95% - 100%)    Parameters below as of 2025 05:35  Patient On (Oxygen Delivery Method): pt is on HFTC  O2 Flow (L/min): 40  O2 Concentration (%): 50    Mode: AC/ CMV (Assist Control/ Continuous Mandatory Ventilation)  RR (machine): 22  FiO2: 40  PEEP: 10  ITime: 1  MAP: 13  PC: 12  PIP: 24    Physical Exam:  General: Alert and interactive,   Neurology: Oriented, following commands. ambulates  Respiratory: Breath sounds bilaterally, trach collar  CV: Afib  Abdominal: Soft, Nontender  Extremities: Warm, well-perfused  Back: open abscess/wound to right paralumbar area, indurated, no fluctuance, no crepitus; sacral wound   Right arm tender, but non-infectious appearing  -------------------------------------------------------------------------------------------------------------------------------    Labs:                        8.0    11.39 )-----------( 214      ( 2025 00:31 )             25.1     02-04    130[L]  |  91[L]  |  26[H]  ----------------------------<  93  4.9   |  26  |  4.12[H]    Ca    8.7      2025 00:29  Phos  3.5     02-04  Mg     2.3     02-04    TPro  6.3  /  Alb  2.8[L]  /  TBili  0.4  /  DBili  x   /  AST  15  /  ALT  9[L]  /  AlkPhos  118  02-04    LIVER FUNCTIONS - ( 2025 00:29 )  Alb: 2.8 g/dL / Pro: 6.3 g/dL / ALK PHOS: 118 U/L / ALT: 9 U/L / AST: 15 U/L / GGT: x           PT/INR - ( 2025 00:20 )   PT: 13.4 sec;   INR: 1.18 ratio       PTT - ( 2025 00:20 )  PTT:37.5 sec    ALL MEDICATIONS   MEDICATIONS  (STANDING):  acetylcysteine 10%  Inhalation 4 milliLiter(s) Inhalation every 6 hours  albuterol    0.083% 2.5 milliGRAM(s) Nebulizer every 6 hours  ascorbic acid 500 milliGRAM(s) Oral daily  aspirin  chewable 81 milliGRAM(s) Oral daily  atorvastatin 80 milliGRAM(s) Oral at bedtime  chlorhexidine 0.12% Liquid 15 milliLiter(s) Oral Mucosa every 12 hours  chlorhexidine 2% Cloths 1 Application(s) Topical daily  chlorhexidine 4% Liquid 1 Application(s) Topical <User Schedule>  dextrose 50% Injectable 50 milliLiter(s) IV Push every 15 minutes  epoetin ignacia (EPOGEN) Injectable 48080 Unit(s) IV Push <User Schedule>  heparin   Injectable 5000 Unit(s) SubCutaneous every 8 hours  insulin lispro (ADMELOG) corrective regimen sliding scale   SubCutaneous every 6 hours  melatonin 5 milliGRAM(s) Oral at bedtime  methocarbamol 500 milliGRAM(s) Oral every 8 hours  mirtazapine 7.5 milliGRAM(s) Oral daily  Nephro-elicia 1 Tablet(s) Oral daily  pantoprazole  Injectable 40 milliGRAM(s) IV Push daily  polyethylene glycol 3350 17 Gram(s) Oral two times a day  pregabalin 50 milliGRAM(s) Oral every 12 hours  sodium chloride 0.65% Nasal 1 Spray(s) Both Nostrils every 12 hours  sodium chloride 0.9%. 1000 milliLiter(s) (10 mL/Hr) IV Continuous <Continuous>  traZODone 50 milliGRAM(s) Oral at bedtime    MEDICATIONS  (PRN):  acetaminophen     Tablet .. 650 milliGRAM(s) Oral every 6 hours PRN Mild Pain (1 - 3)  lidocaine/prilocaine Cream 1 Application(s) Topical daily PRN pre-HD  oxyCODONE    IR 5 milliGRAM(s) Oral every 4 hours PRN Moderate Pain (4 - 6)  sodium chloride 0.9% lock flush 10 milliLiter(s) IV Push every 1 hour PRN Pre/post blood products, medications, blood draw, and to maintain line patency  ------------------------------------------------------------------------------------------------------------------------------  Assessment:  53 yo M s/p CABG x 3 on 24    Postop acute respiratory failure  Acute blood loss anemia  Ascites    ESRD on iHD   E coli bacteremia  pneumonia  VRE E. Faecium   HSV infection  Tracheomalacia     Plan:  ***Neuro***  Postoperative acute pain control with Robaxin, Tylenol, and Oxycodone prn  Trazodone and Melatonin nightly  PT ongoing    ***Cardiovascular***  s/p CABG x 3  A fib s/p cardioversion  Intermittent bradycardia/junction with hypotension - off Theophyline   TTE  LVEF 60%, mod RA pericardial effusion   ASA / Statin daily  2decho to assess for pericardial effusion    ***Pulmonary***  Postoperative acute respiratory failure  Tracheostomy    s/p Bronchoscopy . , , ,   Mucomyst albuterol  Left lung PNA, + e coli ESBL - cleared but now with VRE   Tolerates trach collar with high flow, keep on a vent at night  On HT 3% nebs and albuterol  Bronchoscopy by IP on  - no concerns for tracheomalacia    ***GI***  On Tube feeds at goal, change to vital per nutrition consult  Protonix for stress ulcer prophylaxis.   Ascites: Last paracentesis     ***Renal***  ESRD - tolerating iHD (MWF)   Nephro-Elicia for renal support  iHD yesterday     ***ID***  Monitor cultures   BAL - commensal manjit    VRE BAL - Linezolid    Acyclovir for + HSV in BAL   serratia on tissue cx and h/o ESBL PNA - Meropenem until     ***Endocrine***  Hyperglycemia - Insulin sliding scale    ***Hematology***  Acute blood loss anemia  CBC, coags  Continue Epogen.  Heparin SQ for DVT  No AC s/p cardioversion. not on AC due to moderate pericardial effusion    Wound:  s/p sacral debridement on  . will d/w Wound care for need for wound vac        I, Stella Hodges MD, personally performed the services described in this documentation. All medical record entries made by the scribe were at my direction and in my presence. I have reviewed the chart and agree that the record reflects my personal performance and is accurate and complete  Electronically signed:   Stella Hodges MD  CT ICU attending     ICU time: ** mins     By signing my name below, I, Baldemar Hernandez, attest that this documentation has been prepared under the direction and in the presence of Stella Hodges MD  Electronically signed: Baldemar Hernandez, 25 @ 07:16             Patient seen and examined at the bedside.    Remains critically ill on continuous ICU monitoring, at risk for life threatening decompensation.  All Labs, data reviewed. Plan of care discussed in length during multi-disciplinary ICU rounds.       Brief Summary:  53 yo M with multiple medical problems including CAD s/p PCI in 2024 s/p CABG x 3 on 24  1: Intubated for hypoxia & left lung white out s/p awake bronch with removal of copious mucopurulent secretions  : s/p IABP insertion, sepsis/septic shock due to E coli   ESBL pneumonia, collapsed Left Lung, s/p multiple bronch    tracheostomy tube placement    VRE E. Faecium Bcx   +HSV PCR BAL   tissue cx from back abscess - Serratia   - - intermittent bradycardia/junctional episodes with hypotension  3: off , off theophylline. iHD 1L UF  : bronch for Left lung collapse wound vac    24 Hour events:  2decho w/ moderate pericardial effusion - similar as before  US abd: small - moderate ascites - monitor at this time  CXR w/ Left lung collapse s/p bronch today. HFNC during day and PC at nightg  iHD yesterday and 1L HD, MWF  Wound vac placed for sacral wound today    Objective:  Vital Signs Last 24 Hrs  T(C): 37.2 (2025 00:00), Max: 37.3 (2025 20:00)  T(F): 98.9 (2025 00:00), Max: 99.1 (2025 20:00)  HR: 119 (2025 06:00) (65 - 121)  BP: 140/78 (2025 06:00) (98/79 - 169/80)  BP(mean): 101 (2025 06:00) (78 - 109)  RR: 22 (2025 06:00) (12 - 42)  SpO2: 100% (2025 06:00) (95% - 100%)    Parameters below as of 2025 05:35  Patient On (Oxygen Delivery Method): pt is on HFTC  O2 Flow (L/min): 40  O2 Concentration (%): 50    Mode: AC/ CMV (Assist Control/ Continuous Mandatory Ventilation)  RR (machine): 22  FiO2: 40  PEEP: 10  ITime: 1  MAP: 13  PC: 12  PIP: 24    Physical Exam:  General: Alert and interactive,   Neurology: Oriented, following commands. ambulates  Respiratory: Breath sounds bilaterally, trach collar  CV: Afib  Abdominal: Soft, Nontender  Extremities: Warm, well-perfused  Back: open abscess/wound to right paralumbar area, indurated, no fluctuance, no crepitus; sacral wound   Right arm tender, but non-infectious appearing  -------------------------------------------------------------------------------------------------------------------------------    Labs:                        8.0    11.39 )-----------( 214      ( 2025 00:31 )             25.1     02-04    130[L]  |  91[L]  |  26[H]  ----------------------------<  93  4.9   |  26  |  4.12[H]    Ca    8.7      2025 00:29  Phos  3.5     02-04  Mg     2.3     02-04    TPro  6.3  /  Alb  2.8[L]  /  TBili  0.4  /  DBili  x   /  AST  15  /  ALT  9[L]  /  AlkPhos  118  02-04    LIVER FUNCTIONS - ( 2025 00:29 )  Alb: 2.8 g/dL / Pro: 6.3 g/dL / ALK PHOS: 118 U/L / ALT: 9 U/L / AST: 15 U/L / GGT: x           PT/INR - ( 2025 00:20 )   PT: 13.4 sec;   INR: 1.18 ratio       PTT - ( 2025 00:20 )  PTT:37.5 sec    ALL MEDICATIONS   MEDICATIONS  (STANDING):  acetylcysteine 10%  Inhalation 4 milliLiter(s) Inhalation every 6 hours  albuterol    0.083% 2.5 milliGRAM(s) Nebulizer every 6 hours  ascorbic acid 500 milliGRAM(s) Oral daily  aspirin  chewable 81 milliGRAM(s) Oral daily  atorvastatin 80 milliGRAM(s) Oral at bedtime  chlorhexidine 0.12% Liquid 15 milliLiter(s) Oral Mucosa every 12 hours  chlorhexidine 2% Cloths 1 Application(s) Topical daily  chlorhexidine 4% Liquid 1 Application(s) Topical <User Schedule>  dextrose 50% Injectable 50 milliLiter(s) IV Push every 15 minutes  epoetin ignacia (EPOGEN) Injectable 40334 Unit(s) IV Push <User Schedule>  heparin   Injectable 5000 Unit(s) SubCutaneous every 8 hours  insulin lispro (ADMELOG) corrective regimen sliding scale   SubCutaneous every 6 hours  melatonin 5 milliGRAM(s) Oral at bedtime  methocarbamol 500 milliGRAM(s) Oral every 8 hours  mirtazapine 7.5 milliGRAM(s) Oral daily  Nephro-elicia 1 Tablet(s) Oral daily  pantoprazole  Injectable 40 milliGRAM(s) IV Push daily  polyethylene glycol 3350 17 Gram(s) Oral two times a day  pregabalin 50 milliGRAM(s) Oral every 12 hours  sodium chloride 0.65% Nasal 1 Spray(s) Both Nostrils every 12 hours  sodium chloride 0.9%. 1000 milliLiter(s) (10 mL/Hr) IV Continuous <Continuous>  traZODone 50 milliGRAM(s) Oral at bedtime    MEDICATIONS  (PRN):  acetaminophen     Tablet .. 650 milliGRAM(s) Oral every 6 hours PRN Mild Pain (1 - 3)  lidocaine/prilocaine Cream 1 Application(s) Topical daily PRN pre-HD  oxyCODONE    IR 5 milliGRAM(s) Oral every 4 hours PRN Moderate Pain (4 - 6)  sodium chloride 0.9% lock flush 10 milliLiter(s) IV Push every 1 hour PRN Pre/post blood products, medications, blood draw, and to maintain line patency  ------------------------------------------------------------------------------------------------------------------------------  Assessment:  53 yo M s/p CABG x 3 on 24    Postop acute respiratory failure  Acute blood loss anemia  Ascites    ESRD on iHD   E coli bacteremia  pneumonia  VRE E. Faecium   HSV infection  Tracheomalacia     Plan:  ***Neuro***  Postoperative acute pain control with Robaxin, Tylenol, and Oxycodone prn  Trazodone and Melatonin nightly  PT ongoing    ***Cardiovascular***  s/p CABG x 3  A fib s/p cardioversion  Intermittent bradycardia/junction with hypotension - off Theophyline   TTE  LVEF 60%, mod RA pericardial effusion   ASA / Statin daily  2decho to assess for pericardial effusion    ***Pulmonary***  Postoperative acute respiratory failure  Tracheostomy    s/p Bronchoscopy . , , ,   Mucomyst albuterol  Left lung PNA, + e coli ESBL - cleared but now with VRE   Tolerates trach collar with high flow, keep on a vent at night  On HT 3% nebs and albuterol  Bronchoscopy by IP on  - no concerns for tracheomalacia  s/p bronch for  for Left lung collapse    ***GI***  On Tube feeds at goal, change to vital per nutrition consult  Protonix for stress ulcer prophylaxis.   Ascites: Last paracentesis , monitor     ***Renal***  ESRD - tolerating iHD (MWF)   Nephro-Elicia for renal support  iHD yesterday ([facility])    ***ID***  Monitor cultures   BAL - commensal manjit    VRE BAL - Linezolid    Acyclovir for + HSV in BAL   serratia on tissue cx and h/o ESBL PNA - Meropenem until   monitor off antibiotics    ***Endocrine***  Hyperglycemia - Insulin sliding scale    ***Hematology***  Acute blood loss anemia  CBC, coags  Continue Epogen.  Heparin SQ for DVT  No AC s/p cardioversion. not on AC due to moderate pericardial effusion    Wound:  s/p sacral debridement on  . wound vac placed on     I, Stella Hodges MD, personally performed the services described in this documentation. All medical record entries made by the scribe were at my direction and in my presence. I have reviewed the chart and agree that the record reflects my personal performance and is accurate and complete  Electronically signed:   Stella Hodges MD  CT ICU attending     ICU time: 55 mins     By signing my name below, I, Baldemar Hernandez, attest that this documentation has been prepared under the direction and in the presence of Stella Hodges MD  Electronically signed: Baldemar Hernandez, 25 @ 07:16

## 2025-02-04 NOTE — PROGRESS NOTE ADULT - SUBJECTIVE AND OBJECTIVE BOX
[facility] NEPHROLOGY [facility]ACT[facility]   MD DREW VIZCARRA MD SAKIL BHUIYAN, MD LEILA MENG, JAX    TEL:  OFFICE: [phone]  From 5pm-7am Answering Service [phone]    -- RENAL FOLLOW UP NOTE ---Date of Service 02-04-25 @ 14:39    Patient is a 54y old  Male who presents with a chief complaint of CAD s/p CABG      Patient seen and examined in icu. Patient with trach, in no acute distress.    VITALS:  T(F): 99 (02-04-25 @ 12:00), Max: 99.4 (02-04-25 @ 08:00)  HR: 91 (02-04-25 @ 14:10)  BP: 120/62 (02-04-25 @ 14:00)  RR: 20 (02-04-25 @ 14:00)  SpO2: 92% (02-04-25 @ 14:10)  Wt(kg): --    02-03 @ 07:01  -  02-04 @ 07:00  --------------------------------------------------------  IN: 2395 mL / OUT: 1000 mL / NET: 1395 mL    02-04 @ 07:01  -  02-04 @ 14:39  --------------------------------------------------------  IN: 390 mL / OUT: 0 mL / NET: 390 mL          PHYSICAL EXAM:  General: NAD  Neck: No JVD  Respiratory: +trach; diminished   Cardiovascular: S1, S2, RRR  Gastrointestinal: BS+, soft, NT/ND  Extremities: No peripheral edema    Hospital Medications:   MEDICATIONS  (STANDING):  acetylcysteine 10%  Inhalation 4 milliLiter(s) Inhalation every 6 hours  albuterol    0.083% 2.5 milliGRAM(s) Nebulizer every 6 hours  ascorbic acid 500 milliGRAM(s) Oral daily  aspirin  chewable 81 milliGRAM(s) Oral daily  atorvastatin 80 milliGRAM(s) Oral at bedtime  chlorhexidine 0.12% Liquid 15 milliLiter(s) Oral Mucosa every 12 hours  chlorhexidine 2% Cloths 1 Application(s) Topical daily  chlorhexidine 4% Liquid 1 Application(s) Topical <User Schedule>  dextrose 50% Injectable 50 milliLiter(s) IV Push every 15 minutes  epoetin ignacia (EPOGEN) Injectable 37397 Unit(s) IV Push <User Schedule>  heparin   Injectable 5000 Unit(s) SubCutaneous every 8 hours  insulin lispro (ADMELOG) corrective regimen sliding scale   SubCutaneous every 6 hours  melatonin 5 milliGRAM(s) Oral at bedtime  methocarbamol 500 milliGRAM(s) Oral every 8 hours  metoprolol tartrate Injectable 2.5 milliGRAM(s) IV Push once  mirtazapine 7.5 milliGRAM(s) Oral daily  Nephro-elicia 1 Tablet(s) Oral daily  pantoprazole  Injectable 40 milliGRAM(s) IV Push daily  polyethylene glycol 3350 17 Gram(s) Oral two times a day  sodium chloride 0.65% Nasal 1 Spray(s) Both Nostrils every 12 hours  sodium chloride 0.9%. 1000 milliLiter(s) (10 mL/Hr) IV Continuous <Continuous>  traZODone 50 milliGRAM(s) Oral at bedtime      LABS:  02-04    130[L]  |  91[L]  |  31[H]  ----------------------------<  180[H]  5.2   |  26  |  4.58[H]    Ca    8.7      04 Feb 2025 12:35  Phos  3.3     02-04  Mg     2.3     02-04    TPro  6.4  /  Alb  3.0[L]  /  TBili  0.4  /  DBili      /  AST  13  /  ALT  9[L]  /  AlkPhos  133[H]  02-04    Creatinine Trend: 4.58 <--, 4.12 <--, 5.74 <--, 5.38 <--, 4.78 <--, 3.78 <--, 5.05 <--, 3.99 <--, 5.51 <--    Phosphorus: 3.3 mg/dL (02-04 @ 12:35)  Albumin: 3.0 g/dL (02-04 @ 12:35)  Albumin: 2.8 g/dL (02-04 @ 00:29)  Phosphorus: 3.5 mg/dL (02-04 @ 00:29)                              8.0    11.39 )-----------( 214      ( 04 Feb 2025 00:31 )             25.1     Urine Studies:  Urinalysis - [02-04-25 @ 12:35]      Color  / Appearance  / SG  / pH       Gluc 180 / Ketone   / Bili  / Urobili        Blood  / Protein  / Leuk Est  / Nitrite       RBC  / WBC  / Hyaline  / Gran  / Sq Epi  / Non Sq Epi  / Bacteria       Iron 29, TIBC 143, %sat 20      [12-19-24 @ 05:00]  Ferritin 904      [12-19-24 @ 05:00]  TSH 4.75      [12-24-24 @ 06:50]        RADIOLOGY & ADDITIONAL STUDIES:

## 2025-02-05 LAB
ALBUMIN SERPL ELPH-MCNC: 2.7 G/DL — LOW (ref 3.3–5)
ALP SERPL-CCNC: 120 U/L — SIGNIFICANT CHANGE UP (ref 40–120)
ALT FLD-CCNC: 7 U/L — LOW (ref 10–45)
ANION GAP SERPL CALC-SCNC: 14 MMOL/L — SIGNIFICANT CHANGE UP (ref 5–17)
AST SERPL-CCNC: 17 U/L — SIGNIFICANT CHANGE UP (ref 10–40)
BASOPHILS # BLD AUTO: 0.05 K/UL — SIGNIFICANT CHANGE UP (ref 0–0.2)
BASOPHILS NFR BLD AUTO: 0.4 % — SIGNIFICANT CHANGE UP (ref 0–2)
BILIRUB SERPL-MCNC: 0.4 MG/DL — SIGNIFICANT CHANGE UP (ref 0.2–1.2)
BUN SERPL-MCNC: 39 MG/DL — HIGH (ref 7–23)
CALCIUM SERPL-MCNC: 8.7 MG/DL — SIGNIFICANT CHANGE UP (ref 8.4–10.5)
CHLORIDE SERPL-SCNC: 91 MMOL/L — LOW (ref 96–108)
CREAT SERPL-MCNC: 5.1 MG/DL — HIGH (ref 0.5–1.3)
EGFR: 13 ML/MIN/1.73M2 — LOW
EGFR: 13 ML/MIN/1.73M2 — LOW
EOSINOPHIL # BLD AUTO: 0.48 K/UL — SIGNIFICANT CHANGE UP (ref 0–0.5)
EOSINOPHIL NFR BLD AUTO: 4.3 % — SIGNIFICANT CHANGE UP (ref 0–6)
GLUCOSE SERPL-MCNC: 148 MG/DL — HIGH (ref 70–99)
HCT VFR BLD CALC: 24.3 % — LOW (ref 39–50)
HGB BLD-MCNC: 7.8 G/DL — LOW (ref 13–17)
IMM GRANULOCYTES NFR BLD AUTO: 0.4 % — SIGNIFICANT CHANGE UP (ref 0–0.9)
LYMPHOCYTES # BLD AUTO: 0.55 K/UL — LOW (ref 1–3.3)
MAGNESIUM SERPL-MCNC: 2.4 MG/DL — SIGNIFICANT CHANGE UP (ref 1.6–2.6)
MCHC RBC-ENTMCNC: 30.4 PG — SIGNIFICANT CHANGE UP (ref 27–34)
MCV RBC AUTO: 94.6 FL — SIGNIFICANT CHANGE UP (ref 80–100)
MONOCYTES # BLD AUTO: 1.35 K/UL — HIGH (ref 0–0.9)
MONOCYTES NFR BLD AUTO: 12.1 % — SIGNIFICANT CHANGE UP (ref 2–14)
NEUTROPHILS # BLD AUTO: 8.64 K/UL — HIGH (ref 1.8–7.4)
NEUTROPHILS NFR BLD AUTO: 77.9 % — HIGH (ref 43–77)
NRBC # BLD: 0 /100 WBCS — SIGNIFICANT CHANGE UP (ref 0–0)
NRBC BLD-RTO: 0 /100 WBCS — SIGNIFICANT CHANGE UP (ref 0–0)
PHOSPHATE SERPL-MCNC: 3.3 MG/DL — SIGNIFICANT CHANGE UP (ref 2.5–4.5)
PLATELET # BLD AUTO: 201 K/UL — SIGNIFICANT CHANGE UP (ref 150–400)
POTASSIUM SERPL-MCNC: 5.3 MMOL/L — SIGNIFICANT CHANGE UP (ref 3.5–5.3)
PROT SERPL-MCNC: 6.1 G/DL — SIGNIFICANT CHANGE UP (ref 6–8.3)
RBC # BLD: 2.57 M/UL — LOW (ref 4.2–5.8)
RBC # FLD: 21 % — HIGH (ref 10.3–14.5)
SODIUM SERPL-SCNC: 129 MMOL/L — LOW (ref 135–145)
WBC # BLD: 11.12 K/UL — HIGH (ref 3.8–10.5)
WBC # FLD AUTO: 11.12 K/UL — HIGH (ref 3.8–10.5)

## 2025-02-05 PROCEDURE — 99232 SBSQ HOSP IP/OBS MODERATE 35: CPT

## 2025-02-05 PROCEDURE — 71045 X-RAY EXAM CHEST 1 VIEW: CPT | Mod: 26

## 2025-02-05 PROCEDURE — G0545: CPT

## 2025-02-05 PROCEDURE — 99291 CRITICAL CARE FIRST HOUR: CPT

## 2025-02-05 RX ORDER — VANCOMYCIN HCL IN 5 % DEXTROSE 1.5G/250ML
1000 PLASTIC BAG, INJECTION (ML) INTRAVENOUS ONCE
Refills: 0 | Status: COMPLETED | OUTPATIENT
Start: 2025-02-05 | End: 2025-02-05

## 2025-02-05 RX ADMIN — HEPARIN SODIUM 5000 UNIT(S): 1000 INJECTION INTRAVENOUS; SUBCUTANEOUS at 21:57

## 2025-02-05 RX ADMIN — Medication 15 MILLILITER(S): at 17:05

## 2025-02-05 RX ADMIN — Medication 1 TABLET(S): at 12:26

## 2025-02-05 RX ADMIN — ATORVASTATIN CALCIUM 80 MILLIGRAM(S): 80 TABLET, FILM COATED ORAL at 21:58

## 2025-02-05 RX ADMIN — INSULIN LISPRO 2: 100 INJECTION, SOLUTION INTRAVENOUS; SUBCUTANEOUS at 01:00

## 2025-02-05 RX ADMIN — INSULIN LISPRO 2: 100 INJECTION, SOLUTION INTRAVENOUS; SUBCUTANEOUS at 12:29

## 2025-02-05 RX ADMIN — Medication 1 SPRAY(S): at 06:20

## 2025-02-05 RX ADMIN — Medication 50 MILLIGRAM(S): at 21:58

## 2025-02-05 RX ADMIN — METHOCARBAMOL 500 MILLIGRAM(S): 500 TABLET, FILM COATED ORAL at 13:27

## 2025-02-05 RX ADMIN — ACETYLCYSTEINE 4 MILLILITER(S): 200 INHALANT RESPIRATORY (INHALATION) at 05:09

## 2025-02-05 RX ADMIN — METOPROLOL SUCCINATE 12.5 MILLIGRAM(S): 50 TABLET, EXTENDED RELEASE ORAL at 06:21

## 2025-02-05 RX ADMIN — MIRTAZAPINE 7.5 MILLIGRAM(S): 30 TABLET, FILM COATED ORAL at 12:26

## 2025-02-05 RX ADMIN — Medication 500 MILLIGRAM(S): at 12:26

## 2025-02-05 RX ADMIN — ACETYLCYSTEINE 4 MILLILITER(S): 200 INHALANT RESPIRATORY (INHALATION) at 17:32

## 2025-02-05 RX ADMIN — Medication 2.5 MILLIGRAM(S): at 11:56

## 2025-02-05 RX ADMIN — Medication 1 APPLICATION(S): at 06:22

## 2025-02-05 RX ADMIN — METOPROLOL SUCCINATE 12.5 MILLIGRAM(S): 50 TABLET, EXTENDED RELEASE ORAL at 18:22

## 2025-02-05 RX ADMIN — Medication 5 MILLIGRAM(S): at 21:58

## 2025-02-05 RX ADMIN — AMIODARONE HYDROCHLORIDE 200 MILLIGRAM(S): 50 INJECTION, SOLUTION INTRAVENOUS at 06:22

## 2025-02-05 RX ADMIN — Medication 15 MILLILITER(S): at 06:21

## 2025-02-05 RX ADMIN — Medication 2.5 MILLIGRAM(S): at 05:09

## 2025-02-05 RX ADMIN — Medication 250 MILLIGRAM(S): at 12:20

## 2025-02-05 RX ADMIN — METHOCARBAMOL 500 MILLIGRAM(S): 500 TABLET, FILM COATED ORAL at 21:58

## 2025-02-05 RX ADMIN — ACETYLCYSTEINE 4 MILLILITER(S): 200 INHALANT RESPIRATORY (INHALATION) at 20:10

## 2025-02-05 RX ADMIN — Medication 40 MILLIGRAM(S): at 12:35

## 2025-02-05 RX ADMIN — Medication 2.5 MILLIGRAM(S): at 20:11

## 2025-02-05 RX ADMIN — METHOCARBAMOL 500 MILLIGRAM(S): 500 TABLET, FILM COATED ORAL at 06:21

## 2025-02-05 RX ADMIN — ACETYLCYSTEINE 4 MILLILITER(S): 200 INHALANT RESPIRATORY (INHALATION) at 11:57

## 2025-02-05 RX ADMIN — HEPARIN SODIUM 5000 UNIT(S): 1000 INJECTION INTRAVENOUS; SUBCUTANEOUS at 13:27

## 2025-02-05 RX ADMIN — Medication 2.5 MILLIGRAM(S): at 17:32

## 2025-02-05 RX ADMIN — Medication 81 MILLIGRAM(S): at 12:26

## 2025-02-05 RX ADMIN — HEPARIN SODIUM 5000 UNIT(S): 1000 INJECTION INTRAVENOUS; SUBCUTANEOUS at 06:20

## 2025-02-05 RX ADMIN — EPOETIN ALFA 10000 UNIT(S): 10000 SOLUTION INTRAVENOUS; SUBCUTANEOUS at 09:59

## 2025-02-05 RX ADMIN — Medication 1 APPLICATION(S): at 21:58

## 2025-02-05 NOTE — PROGRESS NOTE ADULT - SUBJECTIVE AND OBJECTIVE BOX
Patient seen and examined at the bedside.    Remains critically ill on continuous ICU monitoring, at risk for life threatening decompensation.  All Labs, data reviewed. Plan of care discussed in length during multi-disciplinary ICU rounds.       Brief Summary:  53 yo M with multiple medical problems including CAD s/p PCI in 2024 s/p CABG x 3 on 24  1/2: Intubated for hypoxia & left lung white out s/p awake bronch with removal of copious mucopurulent secretions  : s/p IABP insertion, sepsis/septic shock due to E coli   ESBL pneumonia, collapsed Left Lung, s/p multiple bronch    tracheostomy tube placement    VRE E. Faecium Bcx   +HSV PCR BAL   tissue cx from back abscess - Serratia   - - intermittent bradycardia/junctional episodes with hypotension  2/3: off , off theophylline. iHD 1L UF  : bronch for Left lung collapse wound vac    24 Hour events:  2decho w/ moderate pericardial effusion - similar as before  US abd: small - moderate ascites - monitor at this time  CXR w/ Left lung collapse s/p bronch yesterday. HFNC during day and PC at nightg  iHD 2/3 and 1L HD, MWF  Wound vac placed for sacral wound yesterday    Objective:  Vital Signs Last 24 Hrs  T(C): 37.2 (2025 08:00), Max: 37.4 (2025 16:00)  T(F): 98.9 (2025 08:00), Max: 99.4 (2025 16:00)  HR: 80 (2025 07:00) (60 - 123)  BP: 137/83 (2025 07:00) (103/64 - 154/73)  BP(mean): 103 (2025 07:00) (76 - 110)  RR: 18 (2025 07:00) (6 - 30)  SpO2: 93% (2025 07:00) (70% - 100%)    Parameters below as of 2025 05:24  Patient On (Oxygen Delivery Method): HFTC        Mode: standby  FiO2: 40              Physical Exam:   General: Alert and interactive,   Neurology: Oriented, following commands. ambulates  Respiratory: Breath sounds bilaterally, trach collar  CV: Afib  Abdominal: Soft, Nontender  Extremities: Warm, well-perfused  Back: open abscess/wound to right paralumbar area, indurated, no fluctuance, no crepitus; sacral wound   Right arm tender, but non-infectious appearing  -------------------------------------------------------------------------------------------------------------------------------    Labs:                        7.8    11.12 )-----------( 201      ( 2025 00:19 )             24.3     02-05    129[L]  |  91[L]  |  39[H]  ----------------------------<  148[H]  5.3   |  24  |  5.10[H]    Ca    8.7      2025 00:18  Phos  3.3     02-05  Mg     2.4     02-    TPro  6.1  /  Alb  2.7[L]  /  TBili  0.4  /  DBili  x   /  AST  17  /  ALT  7[L]  /  AlkPhos  120  02-05    LIVER FUNCTIONS - ( 2025 00:18 )  Alb: 2.7 g/dL / Pro: 6.1 g/dL / ALK PHOS: 120 U/L / ALT: 7 U/L / AST: 17 U/L / GGT: x               ALL MEDICATIONS   MEDICATIONS  (STANDING):  acetylcysteine 10%  Inhalation 4 milliLiter(s) Inhalation every 6 hours  albuterol    0.083% 2.5 milliGRAM(s) Nebulizer every 6 hours  aMIOdarone    Tablet 200 milliGRAM(s) Oral daily  ascorbic acid 500 milliGRAM(s) Oral daily  aspirin  chewable 81 milliGRAM(s) Oral daily  atorvastatin 80 milliGRAM(s) Oral at bedtime  chlorhexidine 0.12% Liquid 15 milliLiter(s) Oral Mucosa every 12 hours  chlorhexidine 2% Cloths 1 Application(s) Topical daily  chlorhexidine 4% Liquid 1 Application(s) Topical <User Schedule>  dextrose 50% Injectable 50 milliLiter(s) IV Push every 15 minutes  epoetin ignacia (EPOGEN) Injectable 20893 Unit(s) IV Push <User Schedule>  heparin   Injectable 5000 Unit(s) SubCutaneous every 8 hours  insulin lispro (ADMELOG) corrective regimen sliding scale   SubCutaneous every 6 hours  melatonin 5 milliGRAM(s) Oral at bedtime  methocarbamol 500 milliGRAM(s) Oral every 8 hours  metoprolol tartrate 12.5 milliGRAM(s) Oral every 12 hours  mirtazapine 7.5 milliGRAM(s) Oral daily  Nephro-elicia 1 Tablet(s) Oral daily  pantoprazole  Injectable 40 milliGRAM(s) IV Push daily  polyethylene glycol 3350 17 Gram(s) Oral two times a day  sodium chloride 0.65% Nasal 1 Spray(s) Both Nostrils every 12 hours  sodium chloride 0.9%. 1000 milliLiter(s) (10 mL/Hr) IV Continuous <Continuous>  traZODone 50 milliGRAM(s) Oral at bedtime    MEDICATIONS  (PRN):  acetaminophen     Tablet .. 650 milliGRAM(s) Oral every 6 hours PRN Mild Pain (1 - 3)  lidocaine/prilocaine Cream 1 Application(s) Topical daily PRN pre-HD  oxyCODONE    IR 5 milliGRAM(s) Oral every 4 hours PRN Moderate Pain (4 - 6)  sodium chloride 0.9% lock flush 10 milliLiter(s) IV Push every 1 hour PRN Pre/post blood products, medications, blood draw, and to maintain line patency    ------------------------------------------------------------------------------------------------------------------------------  Assessment:  53 yo M s/p CABG x 3 on 24    Postop acute respiratory failure  Acute blood loss anemia  Ascites    ESRD on iHD   E coli bacteremia  pneumonia  VRE E. Faecium   HSV infection  Tracheomalacia     Plan:  ***Neuro***  Postoperative acute pain control with Robaxin, Tylenol, and Oxycodone prn  Trazodone and Melatonin nightly  PT ongoing    ***Cardiovascular***  s/p CABG x 3  A fib s/p cardioversion  Intermittent bradycardia/junction with hypotension - off Theophyline   Titrate Lopressor   TTE  LVEF 60%, mod RA pericardial effusion   Amiodarone for afib prophylaxis   ASA / Statin daily  2decho to assess for pericardial effusion    ***Pulmonary***  Postoperative acute respiratory failure  Tracheostomy    s/p Bronchoscopy . , , ,   Mucomyst albuterol  Left lung PNA, + e coli ESBL - cleared but now with VRE   Tolerates trach collar with high flow, keep on a vent at night  On HT 3% nebs and albuterol  Bronchoscopy by IP on  - no concerns for tracheomalacia  s/p bronch for  for Left lung collapse    ***GI***  On Tube feeds at goal, change to vital per nutrition consult  Protonix for stress ulcer prophylaxis.   Ascites: Last paracentesis , monitor     ***Renal***  ESRD - tolerating iHD (MWF)   Nephro-Elicia for renal support  iHD 2/3 (MWF)    ***ID***  Monitor cultures   BAL - commensal manjit    VRE BAL - Linezolid    Acyclovir for + HSV in BAL   serratia on tissue cx and h/o ESBL PNA - Meropenem until   monitor off antibiotics    ***Endocrine***  Hyperglycemia - Insulin sliding scale    ***Hematology***  Acute blood loss anemia  CBC, coags  Continue Epogen.  Heparin SQ for DVT  No AC s/p cardioversion. not on AC due to moderate pericardial effusion    Wound:  s/p sacral debridement on  . wound vac placed on         IStella MD, personally performed the services described in this documentation. all medical record entries made by the scribe were at my direction and in my presence. I have reviewed the chart and agree that the record reflects my personal performance and is accurate and complete  Electronically signed:   Stella Hodges MD  CT ICU attending     ICU time: ** mins     By signing my name below, I, Sonali Valle, attest that this documentation has been prepared under the direction and in the presence of Stella Hodges MD  Electronically signed: Sonali Valle, 25 @ 08:13             Patient seen and examined at the bedside.    Remains critically ill on continuous ICU monitoring, at risk for life threatening decompensation.  All Labs, data reviewed. Plan of care discussed in length during multi-disciplinary ICU rounds.       Brief Summary:  55 yo M with multiple medical problems including CAD s/p PCI in 2024 s/p CABG x 3 on 24  1: Intubated for hypoxia & left lung white out s/p awake bronch with removal of copious mucopurulent secretions  : s/p IABP insertion, sepsis/septic shock due to E coli   ESBL pneumonia, collapsed Left Lung, s/p multiple bronch    tracheostomy tube placement    VRE E. Faecium Bcx   +HSV PCR BAL   tissue cx from back abscess - Serratia   - - intermittent bradycardia/junctional episodes with hypotension  2/3: off , off theophylline. iHD 1L UF  : bronch for Left lung collapse wound vac, 2decho w/ moderate pericardial effusion - similar as before, US abd: small - moderate ascites - monitor at this time.  Wound vac placed for sacral wounds  : iHD-1L UF    24 Hour events:  CXR w/ Left lung collapse s/p bronch yesterday. HFNC during day and PC at night  iHD today 1L UF removal  PT/OT, OOBC as tolerated  : MRSA and serratia from cyst on the back. continue levaquin + Vanc x 5 days    Objective:  Vital Signs Last 24 Hrs  T(C): 37.2 (2025 08:00), Max: 37.4 (2025 16:00)  T(F): 98.9 (2025 08:00), Max: 99.4 (2025 16:00)  HR: 80 (2025 07:00) (60 - 123)  BP: 137/83 (2025 07:00) (103/64 - 154/73)  BP(mean): 103 (2025 07:00) (76 - 110)  RR: 18 (2025 07:00) (6 - 30)  SpO2: 93% (2025 07:00) (70% - 100%)    Parameters below as of 2025 05:24  Patient On (Oxygen Delivery Method): HFTC        Mode: standby  FiO2: 40              Physical Exam:   General: Alert and interactive,   Neurology: Oriented, following commands. ambulates  Respiratory: Breath sounds bilaterally, trach collar  CV: Afib  Abdominal: Soft, Nontender  Extremities: Warm, well-perfused  Back: open abscess/wound to right paralumbar area, indurated, no fluctuance, no crepitus; sacral wound   Right arm tender, but non-infectious appearing  -------------------------------------------------------------------------------------------------------------------------------    Labs:                        7.8    11.12 )-----------( 201      ( 2025 00:19 )             24.3     02-05    129[L]  |  91[L]  |  39[H]  ----------------------------<  148[H]  5.3   |  24  |  5.10[H]    Ca    8.7      2025 00:18  Phos  3.3     02-05  Mg     2.4     02-05    TPro  6.1  /  Alb  2.7[L]  /  TBili  0.4  /  DBili  x   /  AST  17  /  ALT  7[L]  /  AlkPhos  120  02-05    LIVER FUNCTIONS - ( 2025 00:18 )  Alb: 2.7 g/dL / Pro: 6.1 g/dL / ALK PHOS: 120 U/L / ALT: 7 U/L / AST: 17 U/L / GGT: x               ALL MEDICATIONS   MEDICATIONS  (STANDING):  acetylcysteine 10%  Inhalation 4 milliLiter(s) Inhalation every 6 hours  albuterol    0.083% 2.5 milliGRAM(s) Nebulizer every 6 hours  aMIOdarone    Tablet 200 milliGRAM(s) Oral daily  ascorbic acid 500 milliGRAM(s) Oral daily  aspirin  chewable 81 milliGRAM(s) Oral daily  atorvastatin 80 milliGRAM(s) Oral at bedtime  chlorhexidine 0.12% Liquid 15 milliLiter(s) Oral Mucosa every 12 hours  chlorhexidine 2% Cloths 1 Application(s) Topical daily  chlorhexidine 4% Liquid 1 Application(s) Topical <User Schedule>  dextrose 50% Injectable 50 milliLiter(s) IV Push every 15 minutes  epoetin ignacia (EPOGEN) Injectable 82754 Unit(s) IV Push <User Schedule>  heparin   Injectable 5000 Unit(s) SubCutaneous every 8 hours  insulin lispro (ADMELOG) corrective regimen sliding scale   SubCutaneous every 6 hours  melatonin 5 milliGRAM(s) Oral at bedtime  methocarbamol 500 milliGRAM(s) Oral every 8 hours  metoprolol tartrate 12.5 milliGRAM(s) Oral every 12 hours  mirtazapine 7.5 milliGRAM(s) Oral daily  Nephro-elicia 1 Tablet(s) Oral daily  pantoprazole  Injectable 40 milliGRAM(s) IV Push daily  polyethylene glycol 3350 17 Gram(s) Oral two times a day  sodium chloride 0.65% Nasal 1 Spray(s) Both Nostrils every 12 hours  sodium chloride 0.9%. 1000 milliLiter(s) (10 mL/Hr) IV Continuous <Continuous>  traZODone 50 milliGRAM(s) Oral at bedtime    MEDICATIONS  (PRN):  acetaminophen     Tablet .. 650 milliGRAM(s) Oral every 6 hours PRN Mild Pain (1 - 3)  lidocaine/prilocaine Cream 1 Application(s) Topical daily PRN pre-HD  oxyCODONE    IR 5 milliGRAM(s) Oral every 4 hours PRN Moderate Pain (4 - 6)  sodium chloride 0.9% lock flush 10 milliLiter(s) IV Push every 1 hour PRN Pre/post blood products, medications, blood draw, and to maintain line patency    ------------------------------------------------------------------------------------------------------------------------------  Assessment:  55 yo M s/p CABG x 3 on 24    Postop acute respiratory failure  Acute blood loss anemia  Ascites    ESRD on iHD   E coli bacteremia  pneumonia  VRE E. Faecium   HSV infection  Tracheomalacia     Plan:  ***Neuro***  Postoperative acute pain control with Robaxin, Tylenol, and Oxycodone prn  Trazodone and Melatonin nightly  PT ongoing    ***Cardiovascular***  s/p CABG x 3  A fib s/p cardioversion  Intermittent bradycardia/junction with hypotension - off Theophyline   Titrate Lopressor   TTE  LVEF 60%, mod RA pericardial effusion   Amiodarone for afib prophylaxis   ASA / Statin daily  2decho w/ moderate pericardial effusion unchanged    ***Pulmonary***  Postoperative acute respiratory failure  Tracheostomy    s/p Bronchoscopy . , , ,   Mucomyst albuterol  Left lung PNA, + e coli ESBL - cleared but now with VRE   Tolerates trach collar with high flow, keep on a vent at night  On HT 3% nebs and albuterol  Bronchoscopy by IP on  - no concerns for tracheomalacia  s/p bronch for  for Left lung collapse    ***GI***  On Tube feeds at goal, change to vital per nutrition consult  Protonix for stress ulcer prophylaxis.   Ascites: Last paracentesis , monitor     ***Renal***  ESRD - tolerating iHD (MWF)   Nephro-Elicia for renal support  iHD 2/3 (F)    ***ID***  Monitor cultures   BAL - commensal manjit    VRE BAL - Linezolid    Acyclovir for + HSV in BAL   serratia on tissue cx and h/o ESBL PNA - Meropenem until : MRSA and serratia from cyst on the back. continue levaquin + Vanc x 5 days    ***Endocrine***  Hyperglycemia - Insulin sliding scale    ***Hematology***  Acute blood loss anemia  CBC, coags  Continue Epogen.  Heparin SQ for DVT  No AC s/p cardioversion. not on AC due to moderate pericardial effusion    Wound:  s/p sacral debridement on  . wound vac placed on         I, Stella Hodges MD, personally performed the services described in this documentation. all medical record entries made by the scribe were at my direction and in my presence. I have reviewed the chart and agree that the record reflects my personal performance and is accurate and complete  Electronically signed:   Stella Hodges MD  CT ICU attending     ICU time: 52 mins     By signing my name below, I, Sonali Valle, attest that this documentation has been prepared under the direction and in the presence of Stella Hodges MD  Electronically signed: Sonali Valle, 25 @ 08:13

## 2025-02-05 NOTE — PROGRESS NOTE ADULT - SUBJECTIVE AND OBJECTIVE BOX
[facility]    Dr. Nica Styles     Office [phone] (9 am to 5 pm)  Service: [phone] (5pm to 9am)  Also Available on TEAMS      RENAL PROGRESS NOTE: DATE OF SERVICE 02-05-25 @ 14:06    Patient is a 54y old  Male who presents with a chief complaint of CAD s/p CABG (04 Feb 2025 07:14)      Patient seen and examined at bedside. No chest pain/sob    VITALS:  T(F): 98.4 (02-05-25 @ 12:05), Max: 99.4 (02-04-25 @ 16:00)  HR: 86 (02-05-25 @ 14:01)  BP: 146/72 (02-05-25 @ 13:00)  RR: 22 (02-05-25 @ 14:01)  SpO2: 99% (02-05-25 @ 14:01)  Wt(kg): --    02-04 @ 07:01  -  02-05 @ 07:00  --------------------------------------------------------  IN: 1880 mL / OUT: 0 mL / NET: 1880 mL    02-05 @ 07:01  -  02-05 @ 14:06  --------------------------------------------------------  IN: 820 mL / OUT: 1000 mL / NET: -180 mL          PHYSICAL EXAM:  Constitutional: NAD  Neck: No JVD  Respiratory: CTAB, no wheezes, rales or rhonchi  Cardiovascular: S1, S2, RRR  Gastrointestinal: BS+, soft, NT/ND  Extremities: No peripheral edema    Hospital Medications:   MEDICATIONS  (STANDING):  acetylcysteine 10%  Inhalation 4 milliLiter(s) Inhalation every 6 hours  albuterol    0.083% 2.5 milliGRAM(s) Nebulizer every 6 hours  aMIOdarone    Tablet 200 milliGRAM(s) Oral daily  ascorbic acid 500 milliGRAM(s) Oral daily  aspirin  chewable 81 milliGRAM(s) Oral daily  atorvastatin 80 milliGRAM(s) Oral at bedtime  chlorhexidine 0.12% Liquid 15 milliLiter(s) Oral Mucosa every 12 hours  chlorhexidine 2% Cloths 1 Application(s) Topical daily  chlorhexidine 4% Liquid 1 Application(s) Topical <User Schedule>  dextrose 50% Injectable 50 milliLiter(s) IV Push every 15 minutes  epoetin ignacia (EPOGEN) Injectable 57405 Unit(s) IV Push <User Schedule>  heparin   Injectable 5000 Unit(s) SubCutaneous every 8 hours  insulin lispro (ADMELOG) corrective regimen sliding scale   SubCutaneous every 6 hours  melatonin 5 milliGRAM(s) Oral at bedtime  methocarbamol 500 milliGRAM(s) Oral every 8 hours  metoprolol tartrate 12.5 milliGRAM(s) Oral every 12 hours  mirtazapine 7.5 milliGRAM(s) Oral daily  Nephro-elicia 1 Tablet(s) Oral daily  pantoprazole  Injectable 40 milliGRAM(s) IV Push daily  polyethylene glycol 3350 17 Gram(s) Oral two times a day  sodium chloride 0.65% Nasal 1 Spray(s) Both Nostrils every 12 hours  sodium chloride 0.9%. 1000 milliLiter(s) (10 mL/Hr) IV Continuous <Continuous>  traZODone 50 milliGRAM(s) Oral at bedtime      LABS:  02-05    129[L]  |  91[L]  |  39[H]  ----------------------------<  148[H]  5.3   |  24  |  5.10[H]    Ca    8.7      05 Feb 2025 00:18  Phos  3.3     02-05  Mg     2.4     02-05    TPro  6.1  /  Alb  2.7[L]  /  TBili  0.4  /  DBili      /  AST  17  /  ALT  7[L]  /  AlkPhos  120  02-05    Creatinine Trend: 5.10 <--, 4.58 <--, 4.12 <--, 5.74 <--, 5.38 <--, 4.78 <--, 3.78 <--, 5.05 <--, 3.99 <--    Albumin: 2.7 g/dL (02-05 @ 00:18)  Phosphorus: 3.3 mg/dL (02-05 @ 00:18)                              7.8    11.12 )-----------( 201      ( 05 Feb 2025 00:19 )             24.3     Urine Studies:  Urinalysis - [02-05-25 @ 00:18]      Color  / Appearance  / SG  / pH       Gluc 148 / Ketone   / Bili  / Urobili        Blood  / Protein  / Leuk Est  / Nitrite       RBC  / WBC  / Hyaline  / Gran  / Sq Epi  / Non Sq Epi  / Bacteria       Iron 29, TIBC 143, %sat 20      [12-19-24 @ 05:00]  Ferritin 904      [12-19-24 @ 05:00]  TSH 4.75      [12-24-24 @ 06:50]        RADIOLOGY & ADDITIONAL STUDIES:

## 2025-02-05 NOTE — PROGRESS NOTE ADULT - ASSESSMENT
54M with HTN, HLD, ESRD on HD MWF via LUE AVF, ascites (requiring repeat paracenteses q4-6wks), NICM with moderate LV dysfunction w/ EF 35-40%() and CAD s/p atherectomy + SALLIE to D1 (2024) presents to [facility] 2024 as transfer from [facility] (via [facility]) where he presented  with SOB.   In [facility] ED, pt was hypoxic to 86% on RA, trop 1.7K, EKG no new changes, CXR fluid overload. Admitted for AHRF 2/2 fluid overload. Pt received HD on , ,  and . DAPT was resumed, TTE showed improvement in LVEF to 40-45%. Stress test was abnormal with 1mm inferior STD, reversible ischemia in the inferior wall and fixed defects in the lateral, anterior and apical walls with post stress EF of 24%. Pt was then transferred to [facility] for cardiac cath which showed pLAD 70% ISR, mLCx 70%, D1 severe dz. Patient now transferred to [facility] 2024 for CABG.       - underwent  C3L free LIMA-LAD; SVG-Diag; SVG-leftPL   - extubated   - hypotension and ECHO showing Systolic HF/depressed BIV Function, currently supported with /pressors.  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 spoke to pharm D - can't use bactrim b/o hyperkalemia so will give vanco/levaquin for 3-5 days dosed for HD  - surgical f/u appreciated   - yeast in bronch, not clear if true infection, monitor off antifungal for now  - acyclovir completed  - check cultures from bronch      Marla Champion M.D. ,   please reach via teams   If no answer, or after 5PM/ weekends,  then please call  [phone]    Assessment and plan discussed with the primary team .

## 2025-02-05 NOTE — PROGRESS NOTE ADULT - SUBJECTIVE AND OBJECTIVE BOX
MR#[id number]  PATIENT NAME:RAAD CANO    DATE OF SERVICE: 02-05-25 @ 0930  Patient was seen and examined by Solo Quick MD on    02-05-25 @ 0930  Interim events noted.Consultant notes ,Labs,Telemetry reviewed by me       HOSPITAL COURSE: HPI:  54M with PMH of HTN, HLD, ESRD on HD MWF via LUE AVF, ascites (requiring repeat paracenteses q4-6wks), NICM with moderate LV dysfunction w/ EF 35-40%(2023) and CAD s/p atherectomy + SALLIE to D1(4/11/2024) presents to [facility] transferred from [facility]. Patient initially presented to [facility] ED with shortness of breath. Of note he was recently admitted from 09/27-12/02 for acute cholecystitis s/p cholecystectomy. In Critical [facility] hospital ED, pt was hypoxic to 86% on RA, trop 1.7K, EKG no new changes, CXR fluid overload. Admitted for AHRF 2/2 fluid overload. Pt received HD on 12/18, 12/19, 12/20 and 12/22. DAPT was resumed, TTE showed improvement in LVEF to 40-45%. Stress test was abnormal with 1mm inferior STD, reversible ischemia in the inferior wall and fixed defects in the lateral, anterior and apical walls with post stress EF of 24%. Pt was then transferred to [facility] for cardiac cath which showed pLAD 70% ISR, mLCx 70%, D1 severe dz. Patient now transferred to [facility] CTS Dr. Rivers for CABG eval. Patient denies any current SOB or chest pain on admission. Otherwise denies headaches, abdominal pain, urinary or bowel changes, fevers, chills, N/V/D, sick contacts.  (24 Dec 2024 05:07)      INTERIM EVENTS:Patient seen at bedside ,interim events noted.      PMH -reviewed admission note, no change since admission  HEART FAILURE: Acute[ ]Chronic[ ] Systolic[ ] Diastolic[ ] Combined Systolic and Diastolic[ ]  CAD[ ] CABG[ ] PCI[ ]  DEVICES[ ] PPM[ ] ICD[ ] ILR[ ]  ATRIAL FIBRILLATION[ ] Paroxysmal[ ] Permanent[ ] CHADS2-[  ]  GHASSAN[ ] CKD1[ ] CKD2[ ] CKD3[ ] CKD4[ ] ESRD[ ]  COPD[ ] HTN[ ]   DM[ ] Type1[ ] Type 2[ ]   CVA[ ] Paresis[ ]    AMBULATION: Assisted[ ] Cane/walker[ ] Independent[ ]    MEDICATIONS  (STANDING):  acetylcysteine 10%  Inhalation 4 milliLiter(s) Inhalation every 6 hours  albuterol    0.083% 2.5 milliGRAM(s) Nebulizer every 6 hours  aMIOdarone    Tablet 200 milliGRAM(s) Oral daily  ascorbic acid 500 milliGRAM(s) Oral daily  aspirin  chewable 81 milliGRAM(s) Oral daily  atorvastatin 80 milliGRAM(s) Oral at bedtime  chlorhexidine 0.12% Liquid 15 milliLiter(s) Oral Mucosa every 12 hours  chlorhexidine 2% Cloths 1 Application(s) Topical daily  chlorhexidine 4% Liquid 1 Application(s) Topical <User Schedule>  dextrose 50% Injectable 50 milliLiter(s) IV Push every 15 minutes  epoetin ignacia (EPOGEN) Injectable 46256 Unit(s) IV Push <User Schedule>  heparin   Injectable 5000 Unit(s) SubCutaneous every 8 hours  insulin lispro (ADMELOG) corrective regimen sliding scale   SubCutaneous every 6 hours  melatonin 5 milliGRAM(s) Oral at bedtime  methocarbamol 500 milliGRAM(s) Oral every 8 hours  metoprolol tartrate 12.5 milliGRAM(s) Oral every 12 hours  mirtazapine 7.5 milliGRAM(s) Oral daily  Nephro-elicia 1 Tablet(s) Oral daily  pantoprazole  Injectable 40 milliGRAM(s) IV Push daily  polyethylene glycol 3350 17 Gram(s) Oral two times a day  sodium chloride 0.65% Nasal 1 Spray(s) Both Nostrils every 12 hours  sodium chloride 0.9%. 1000 milliLiter(s) (10 mL/Hr) IV Continuous <Continuous>  traZODone 50 milliGRAM(s) Oral at bedtime    MEDICATIONS  (PRN):  acetaminophen     Tablet .. 650 milliGRAM(s) Oral every 6 hours PRN Mild Pain (1 - 3)  lidocaine/prilocaine Cream 1 Application(s) Topical daily PRN pre-HD  oxyCODONE    IR 5 milliGRAM(s) Oral every 4 hours PRN Moderate Pain (4 - 6)  sodium chloride 0.9% lock flush 10 milliLiter(s) IV Push every 1 hour PRN Pre/post blood products, medications, blood draw, and to maintain line patency            REVIEW OF SYSTEMS:  Constitutional: [ ] fever, [ ]weight loss,  [ ]fatigue [ ]weight gain  Eyes: [ ] visual changes  Respiratory: [ ]shortness of breath;  [ ] cough, [ ]wheezing, [ ]chills, [ ]hemoptysis  Cardiovascular: [ ] chest pain, [ ]palpitations, [ ]dizziness,  [ ]leg swelling[ ]orthopnea[ ]PND  Gastrointestinal: [ ] abdominal pain, [ ]nausea, [ ]vomiting,  [ ]diarrhea [ ]Constipation [ ]Melena  Genitourinary: [ ] dysuria, [ ] hematuria [ ]Vigil  Neurologic: [ ] headaches [ ] tremors[ ]weakness [ ]Paralysis Right[ ] Left[ ]  Skin: [ ] itching, [ ]burning, [ ] rashes  Endocrine: [ ] heat or cold intolerance  Musculoskeletal: [ ] joint pain or swelling; [ ] muscle, back, or extremity pain  Psychiatric: [ ] depression, [ ]anxiety, [ ]mood swings, or [ ]difficulty sleeping  Hematologic: [ ] easy bruising, [ ] bleeding gums    [ ] All remaining systems negative except as per above.   [ ]Unable to obtain.  [x] No change in ROS since admission      Vital Signs Last 24 Hrs  T(C): 37 (05 Feb 2025 20:00), Max: 37.4 (05 Feb 2025 04:00)  T(F): 98.6 (05 Feb 2025 20:00), Max: 99.3 (05 Feb 2025 04:00)  HR: 66 (05 Feb 2025 21:00) (60 - 103)  BP: 123/64 (05 Feb 2025 21:00) (107/58 - 163/73)  BP(mean): 89 (05 Feb 2025 21:00) (77 - 110)  RR: 20 (05 Feb 2025 20:00) (6 - 29)  SpO2: 99% (05 Feb 2025 21:00) (70% - 100%)    Parameters below as of 05 Feb 2025 20:00  Patient On (Oxygen Delivery Method): [facility]  O2 Flow (L/min): 40  O2 Concentration (%): 50  I&O's Summary    04 Feb 2025 07:01  -  05 Feb 2025 07:00  --------------------------------------------------------  IN: 1880 mL / OUT: 0 mL / NET: 1880 mL    05 Feb 2025 07:01  -  05 Feb 2025 22:04  --------------------------------------------------------  IN: 1060 mL / OUT: 1000 mL / NET: 60 mL        PHYSICAL EXAM:  General: No acute distress BMI-  HEENT: EOMI, PERRL  Neck: Supple, [ ] JVD  Lungs: Equal air entry bilaterally; [ ] rales [ ] wheezing [ ] rhonchi  Heart: Regular rate and rhythm; [x ] murmur   2/6 [ x] systolic [ ] diastolic [ ] radiation[ ] rubs [ ]  gallops  Abdomen: Nontender, bowel sounds present  Extremities: No clubbing, cyanosis, [ ] edema [ ]Pulses  equal and intact  Nervous system:  Alert & Oriented X3, no focal deficits  Psychiatric: Normal affect  Skin: No rashes or lesions    LABS:  02-05    129[L]  |  91[L]  |  39[H]  ----------------------------<  148[H]  5.3   |  24  |  5.10[H]    Ca    8.7      05 Feb 2025 00:18  Phos  3.3     02-05  Mg     2.4     02-05    TPro  6.1  /  Alb  2.7[L]  /  TBili  0.4  /  DBili  x   /  AST  17  /  ALT  7[L]  /  AlkPhos  120  02-05    Creatinine Trend: 5.10<--, 4.58<--, 4.12<--, 5.74<--, 5.38<--, 4.78<--                        7.8    11.12 )-----------( 201      ( 05 Feb 2025 00:19 )             24.3

## 2025-02-05 NOTE — PROGRESS NOTE ADULT - SUBJECTIVE AND OBJECTIVE BOX
Patient is a 54y old  Male who presents with a chief complaint of CAD s/p CABG (04 Feb 2025 07:14)    Being followed by ID for        Interval history:  No other acute events      ROS:  No cough,SOB,CP  No N/V/D  No abd pain  No urinary complaints  No HA  No joint or limb pain  No other complaints    PAST MEDICAL & SURGICAL HISTORY:  Type 2 diabetes mellitus      Hypertension      End stage renal disease  started HD 2/2019 T, Th, Sat via right chest permacath      Bone spur  right shoulder- hx of - sx done      Anemia      Injury of right wrist  hx of at age 14      History of hyperkalemia  before HD- K-6.2 - to repeat in am of sx, pt had HD today      S/P arthroscopy of right shoulder  2010      History of vascular access device  right chest permacath - 2/2019      H/O right wrist surgery  tendons repair - at age 14      AV fistula      H/O ventral hernia repair      S/P cholecystectomy        Allergies    No Known Allergies    Intolerances      Antimicrobials:      MEDICATIONS  (STANDING):  acetylcysteine 10%  Inhalation 4 milliLiter(s) Inhalation every 6 hours  albuterol    0.083% 2.5 milliGRAM(s) Nebulizer every 6 hours  aMIOdarone    Tablet 200 milliGRAM(s) Oral daily  ascorbic acid 500 milliGRAM(s) Oral daily  aspirin  chewable 81 milliGRAM(s) Oral daily  atorvastatin 80 milliGRAM(s) Oral at bedtime  chlorhexidine 0.12% Liquid 15 milliLiter(s) Oral Mucosa every 12 hours  chlorhexidine 2% Cloths 1 Application(s) Topical daily  chlorhexidine 4% Liquid 1 Application(s) Topical <User Schedule>  dextrose 50% Injectable 50 milliLiter(s) IV Push every 15 minutes  epoetin ignacia (EPOGEN) Injectable 38662 Unit(s) IV Push <User Schedule>  heparin   Injectable 5000 Unit(s) SubCutaneous every 8 hours  insulin lispro (ADMELOG) corrective regimen sliding scale   SubCutaneous every 6 hours  melatonin 5 milliGRAM(s) Oral at bedtime  methocarbamol 500 milliGRAM(s) Oral every 8 hours  metoprolol tartrate 12.5 milliGRAM(s) Oral every 12 hours  mirtazapine 7.5 milliGRAM(s) Oral daily  Nephro-elicia 1 Tablet(s) Oral daily  pantoprazole  Injectable 40 milliGRAM(s) IV Push daily  polyethylene glycol 3350 17 Gram(s) Oral two times a day  sodium chloride 0.65% Nasal 1 Spray(s) Both Nostrils every 12 hours  sodium chloride 0.9%. 1000 milliLiter(s) (10 mL/Hr) IV Continuous <Continuous>  traZODone 50 milliGRAM(s) Oral at bedtime      Vital Signs Last 24 Hrs  T(C): 37.2 (02-05-25 @ 08:00), Max: 37.4 (02-04-25 @ 16:00)  T(F): 98.9 (02-05-25 @ 08:00), Max: 99.4 (02-04-25 @ 16:00)  HR: 84 (02-05-25 @ 08:00) (60 - 122)  BP: 141/78 (02-05-25 @ 08:00) (103/64 - 154/73)  BP(mean): 101 (02-05-25 @ 08:00) (76 - 110)  RR: 16 (02-05-25 @ 08:00) (6 - 30)  SpO2: 100% (02-05-25 @ 08:00) (70% - 100%)    Physical Exam:    Constitutional well preserved,comfortable,pleasant    HEENT PERRLA EOMI,No pallor or icterus    No oral exudate or erythema    Neck supple no JVD or LN    Chest Good AE,CTA    CVS RRR S1 S2 WNl No murmur or rub or gallop    Abd soft BS normal No tenderness no masses    Ext No cyanosis clubbing or edema    IV site no erythema tenderness or discharge    Joints no swelling or LOM    CNS AAO X 3 no focal    Lab Data:                          7.8    11.12 )-----------( 201      ( 05 Feb 2025 00:19 )             24.3       02-05    129[L]  |  91[L]  |  39[H]  ----------------------------<  148[H]  5.3   |  24  |  5.10[H]    Ca    8.7      05 Feb 2025 00:18  Phos  3.3     02-05  Mg     2.4     02-05    TPro  6.1  /  Alb  2.7[L]  /  TBili  0.4  /  DBili  x   /  AST  17  /  ALT  7[L]  /  AlkPhos  120  02-05      Urinalysis Basic - ( 05 Feb 2025 00:18 )    Color: x / Appearance: x / SG: x / pH: x  Gluc: 148 mg/dL / Ketone: x  / Bili: x / Urobili: x   Blood: x / Protein: x / Nitrite: x   Leuk Esterase: x / RBC: x / WBC x   Sq Epi: x / Non Sq Epi: x / Bacteria: x        Drainage  02-01-25   Growth in fluid media only Staphylococcus aureus  Growth in fluid media only Serratia marcescens  --  Methicillin resistant Staphylococcus aureus  Serratia marcescens      Bronchial  01-30-25   Commensal manjit consistent with body site  --    Few polymorphonuclear leukocytes seen per low power field  Few Squamous epithelial cells seen per low power field  Few Yeast like cells seen per oil power field      Creatinine Trend  5.10 mg/dL (02-05-25 @ 00:18)  4.58 mg/dL (02-04-25 @ 12:35)  4.12 mg/dL (02-04-25 @ 00:29)  5.74 mg/dL (02-03-25 @ 11:34)  5.38 mg/dL (02-03-25 @ 00:20)  4.78 mg/dL (02-02-25 @ 00:27)  3.78 mg/dL (02-01-25 @ 00:42)  5.05 mg/dL (01-31-25 @ 00:30)  3.99 mg/dL (01-30-25 @ 00:24)        WBC Count: 11.12 (02-05-25 @ 00:19)  WBC Count: 11.39 (02-04-25 @ 00:31)  WBC Count: 10.02 (02-03-25 @ 00:20)  WBC Count: 9.68 (02-02-25 @ 00:21)  WBC Count: 11.02 (02-01-25 @ 00:42)  WBC Count: 9.40 (01-31-25 @ 00:30)  WBC Count: 8.96 (01-30-25 @ 00:24)       Bilirubin Total: 0.4 mg/dL (02-05-25 @ 00:18)  Aspartate Aminotransferase (AST/SGOT): 17 U/L (02-05-25 @ 00:18)  Alanine Aminotransferase (ALT/SGPT): 7 U/L (02-05-25 @ 00:18)  Alkaline Phosphatase: 120 U/L (02-05-25 @ 00:18)  Bilirubin Total: 0.4 mg/dL (02-04-25 @ 12:35)  Aspartate Aminotransferase (AST/SGOT): 13 U/L (02-04-25 @ 12:35)  Alanine Aminotransferase (ALT/SGPT): 9 U/L (02-04-25 @ 12:35)  Alkaline Phosphatase: 133 U/L (02-04-25 @ 12:35)  Bilirubin Total: 0.4 mg/dL (02-04-25 @ 00:29)  Aspartate Aminotransferase (AST/SGOT): 15 U/L (02-04-25 @ 00:29)  Alanine Aminotransferase (ALT/SGPT): 9 U/L (02-04-25 @ 00:29)  Alkaline Phosphatase: 118 U/L (02-04-25 @ 00:29)  Bilirubin Total: 0.4 mg/dL (02-03-25 @ 00:20)  Aspartate Aminotransferase (AST/SGOT): 16 U/L (02-03-25 @ 00:20)  Alanine Aminotransferase (ALT/SGPT): 10 U/L (02-03-25 @ 00:20)  Alkaline Phosphatase: 122 U/L (02-03-25 @ 00:20)  Bilirubin Total: 0.5 mg/dL (02-02-25 @ 00:27)  Aspartate Aminotransferase (AST/SGOT): 20 U/L (02-02-25 @ 00:27)  Alanine Aminotransferase (ALT/SGPT): 10 U/L (02-02-25 @ 00:27)  Alkaline Phosphatase: 125 U/L (02-02-25 @ 00:27)  Bilirubin Total: 0.5 mg/dL (02-01-25 @ 00:42)  Aspartate Aminotransferase (AST/SGOT): 14 U/L (02-01-25 @ 00:42)  Alanine Aminotransferase (ALT/SGPT): 11 U/L (02-01-25 @ 00:42)  Alkaline Phosphatase: 121 U/L (02-01-25 @ 00:42)         Patient is a 54y old  Male who presents with a chief complaint of CAD s/p CABG (04 Feb 2025 07:14)    Being followed by ID for        Interval history:  pt resting quietly  sleeping   receiving dialysis   No other acute events          PAST MEDICAL & SURGICAL HISTORY:  Type 2 diabetes mellitus      Hypertension      End stage renal disease  started HD 2/2019 T, Th, Sat via right chest permacath      Bone spur  right shoulder- hx of - sx done      Anemia      Injury of right wrist  hx of at age 14      History of hyperkalemia  before HD- K-6.2 - to repeat in am of sx, pt had HD today      S/P arthroscopy of right shoulder  2010      History of vascular access device  right chest permacath - 2/2019      H/O right wrist surgery  tendons repair - at age 14      AV fistula      H/O ventral hernia repair      S/P cholecystectomy        Allergies    No Known Allergies    Intolerances      Antimicrobials:      MEDICATIONS  (STANDING):  acetylcysteine 10%  Inhalation 4 milliLiter(s) Inhalation every 6 hours  albuterol    0.083% 2.5 milliGRAM(s) Nebulizer every 6 hours  aMIOdarone    Tablet 200 milliGRAM(s) Oral daily  ascorbic acid 500 milliGRAM(s) Oral daily  aspirin  chewable 81 milliGRAM(s) Oral daily  atorvastatin 80 milliGRAM(s) Oral at bedtime  chlorhexidine 0.12% Liquid 15 milliLiter(s) Oral Mucosa every 12 hours  chlorhexidine 2% Cloths 1 Application(s) Topical daily  chlorhexidine 4% Liquid 1 Application(s) Topical <User Schedule>  dextrose 50% Injectable 50 milliLiter(s) IV Push every 15 minutes  epoetin ignacia (EPOGEN) Injectable 79830 Unit(s) IV Push <User Schedule>  heparin   Injectable 5000 Unit(s) SubCutaneous every 8 hours  insulin lispro (ADMELOG) corrective regimen sliding scale   SubCutaneous every 6 hours  melatonin 5 milliGRAM(s) Oral at bedtime  methocarbamol 500 milliGRAM(s) Oral every 8 hours  metoprolol tartrate 12.5 milliGRAM(s) Oral every 12 hours  mirtazapine 7.5 milliGRAM(s) Oral daily  Nephro-elicia 1 Tablet(s) Oral daily  pantoprazole  Injectable 40 milliGRAM(s) IV Push daily  polyethylene glycol 3350 17 Gram(s) Oral two times a day  sodium chloride 0.65% Nasal 1 Spray(s) Both Nostrils every 12 hours  sodium chloride 0.9%. 1000 milliLiter(s) (10 mL/Hr) IV Continuous <Continuous>  traZODone 50 milliGRAM(s) Oral at bedtime      Vital Signs Last 24 Hrs  T(C): 37.2 (02-05-25 @ 08:00), Max: 37.4 (02-04-25 @ 16:00)  T(F): 98.9 (02-05-25 @ 08:00), Max: 99.4 (02-04-25 @ 16:00)  HR: 84 (02-05-25 @ 08:00) (60 - 122)  BP: 141/78 (02-05-25 @ 08:00) (103/64 - 154/73)  BP(mean): 101 (02-05-25 @ 08:00) (76 - 110)  RR: 16 (02-05-25 @ 08:00) (6 - 30)  SpO2: 100% (02-05-25 @ 08:00) (70% - 100%)    Physical Exam:    Constitutional sleeping    Neck trach    Chest Good AE,CTA    CVS  S1 S2     Abd soft     Ext No cyanosis clubbing or edema    IV site no erythema tenderness or discharge    Joints no swelling or LOM        Lab Data:                          7.8    11.12 )-----------( 201      ( 05 Feb 2025 00:19 )             24.3       02-05    129[L]  |  91[L]  |  39[H]  ----------------------------<  148[H]  5.3   |  24  |  5.10[H]    Ca    8.7      05 Feb 2025 00:18  Phos  3.3     02-05  Mg     2.4     02-05    TPro  6.1  /  Alb  2.7[L]  /  TBili  0.4  /  DBili  x   /  AST  17  /  ALT  7[L]  /  AlkPhos  120  02-05          Drainage  02-01-25   Growth in fluid media only Staphylococcus aureus  Growth in fluid media only Serratia marcescens  --  Methicillin resistant Staphylococcus aureus  Serratia marcescens  Culture - Wound Aerobic/Anaerobic (02.01.25 @ 17:33)    -  Piperacillin/Tazobactam: S <=8   -  Rifampin: S <=1 Should not be used as monotherapy   -  Tetracycline: R >8   -  Tobramycin: R >8   -  Trimethoprim/Sulfamethoxazole: S 1/19   -  Trimethoprim/Sulfamethoxazole: S <=0.5/9.5   -  Vancomycin: S 1   -  Amoxicillin/Clavulanic Acid: R >16/8   -  Ampicillin: R >16 These ampicillin results predict results for amoxicillin   -  Ampicillin/Sulbactam: R >16/8   -  Aztreonam: S <=4   -  Cefazolin: R >16   -  Cefepime: S <=2   -  Cefoxitin: R >16   -  Ceftriaxone: S <=1   -  Ciprofloxacin: I 0.5   -  Clindamycin: S 0.5   -  Daptomycin: S 1   -  Ertapenem: S <=0.5   -  Erythromycin: S <=0.25   -  Gentamicin: S <=4 Should not be used as monotherapy   -  Gentamicin: I 4   -  Levofloxacin: S <=0.5   -  Linezolid: S 4   -  Meropenem: S <=1   -  Oxacillin: R >2   -  Penicillin: R >2   Specimen Source: Drainage   Culture Results:   Growth in fluid media only Staphylococcus aureus  Growth in fluid media only Serratia marcescens   Organism Identification: Methicillin resistant Staphylococcus aureus  Serratia marcescens   Organism: Serratia marcescens   Organism: Methicillin resistant Staphylococcus aureus   Method Type: LISA   Method Type: LISA          [facility]  01-30-25   Commensal manjit consistent with body site  --    Few polymorphonuclear leukocytes seen per low power field  Few Squamous epithelial cells seen per low power field  Few Yeast like cells seen per oil power field      < from: 12 Lead ECG (02.04.25 @ 16:00) >    Ventricular Rate 85 BPM    Atrial Rate 85 BPM    QRS Duration 114 ms    Q-T Interval 394 ms    QTC Calculation(Bazett) 468 ms    R Axis -76 degrees    T Axis 123 degrees    Diagnosis Line aflutter wiht variable block  LEFT AXIS DEVIATION  INCOMPLETE RIGHT BUNDLE BRANCH BLOCK  ANTERIOR INFARCT (CITED ON OR BEFORE 04-FEB-2025)  ABNORMAL ECG  WHEN COMPARED WITH ECG OF 04-FEB-2025 16:00, (UNCONFIRMED)  NO SIGNIFICANT CHANGE WAS FOUND  Confirmed by MD TED, SUZANNA ([id number]) on 2/5/2025 2:05:00 PM    < end of copied text >        WBC Count: 11.12 (02-05-25 @ 00:19)  WBC Count: 11.39 (02-04-25 @ 00:31)  WBC Count: 10.02 (02-03-25 @ 00:20)  WBC Count: 9.68 (02-02-25 @ 00:21)  WBC Count: 11.02 (02-01-25 @ 00:42)  WBC Count: 9.40 (01-31-25 @ 00:30)  WBC Count: 8.96 (01-30-25 @ 00:24)       Bilirubin Total: 0.4 mg/dL (02-05-25 @ 00:18)  Aspartate Aminotransferase (AST/SGOT): 17 U/L (02-05-25 @ 00:18)  Alanine Aminotransferase (ALT/SGPT): 7 U/L (02-05-25 @ 00:18)  Alkaline Phosphatase: 120 U/L (02-05-25 @ 00:18)  Bilirubin Total: 0.4 mg/dL (02-04-25 @ 12:35)  Aspartate Aminotransferase (AST/SGOT): 13 U/L (02-04-25 @ 12:35)  Alanine Aminotransferase (ALT/SGPT): 9 U/L (02-04-25 @ 12:35)  Alkaline Phosphatase: 133 U/L (02-04-25 @ 12:35)  Bilirubin Total: 0.4 mg/dL (02-04-25 @ 00:29)  Aspartate Aminotransferase (AST/SGOT): 15 U/L (02-04-25 @ 00:29)  Alanine Aminotransferase (ALT/SGPT): 9 U/L (02-04-25 @ 00:29)  Alkaline Phosphatase: 118 U/L (02-04-25 @ 00:29)  Bilirubin Total: 0.4 mg/dL (02-03-25 @ 00:20)  Aspartate Aminotransferase (AST/SGOT): 16 U/L (02-03-25 @ 00:20)  Alanine Aminotransferase (ALT/SGPT): 10 U/L (02-03-25 @ 00:20)  Alkaline Phosphatase: 122 U/L (02-03-25 @ 00:20)  Bilirubin Total: 0.5 mg/dL (02-02-25 @ 00:27)  Aspartate Aminotransferase (AST/SGOT): 20 U/L (02-02-25 @ 00:27)  Alanine Aminotransferase (ALT/SGPT): 10 U/L (02-02-25 @ 00:27)  Alkaline Phosphatase: 125 U/L (02-02-25 @ 00:27)  Bilirubin Total: 0.5 mg/dL (02-01-25 @ 00:42)  Aspartate Aminotransferase (AST/SGOT): 14 U/L (02-01-25 @ 00:42)  Alanine Aminotransferase (ALT/SGPT): 11 U/L (02-01-25 @ 00:42)  Alkaline Phosphatase: 121 U/L (02-01-25 @ 00:42)

## 2025-02-05 NOTE — PROGRESS NOTE ADULT - ASSESSMENT
54M with PMH of HTN, HLD, ESRD on HD MWF via LUE AVF, ascites (requiring repeat paracenteses q4-6wks), NICM with moderate LV dysfunction w/ EF 35-40%() and CAD s/p atherectomy + SALLIE to D1(2024) presents to [facility] transferred from [facility]. Patient initially presented to [facility] ED with shortness of breath. Of note he was recently admitted from - for acute cholecystitis s/p cholecystectomy. In [facility] ED, pt was hypoxic to 86% on RA, trop 1.7K, EKG no new changes, CXR fluid overload. Admitted for AHRF 2/2 fluid overload. Pt received HD on , ,  and . DAPT was resumed, TTE showed improvement in LVEF to 40-45%. Stress test was abnormal with 1mm inferior STD, reversible ischemia in the inferior wall and fixed defects in the lateral, anterior and apical walls with post stress EF of 24%.     Pt was then transferred to [facility] for cardiac cath which showed pLAD 70% ISR, mLCx 70%, D1 severe dz. Patient now transferred to [facility] CTS Dr. Rivers for CABG eval.# CAD s/p NSTEMI  Hx CAD s/p PCI LAD   Presented with ADHF elevated troponins  Positive NST1-Cath- pLAD 70% ISR, mLCx 70%, D1 severe dz.   TTE EF 35% Severe TRWas on Plavix-p2y12- 321 been off Plavix since -POD#1-C3L free LIMA-LAD; SVG-Diag; WNK-bzdkNL-Kwjbxlpmm on  Sinus rhythm,Amio for AF prophylaxis  -OOB off  Sinus rhythm   -POD#3-On /pressors-EF 25-30% RV failure with transaminitis-Sinus Ioqzfq59/03-POD#4-Was intubated for hypoxia-gradual hypotension on /pressors had IABP inserted this morning  -POD#5-s/p IABP insertion, sepsis/septic shock due to GNR/ECOLI HD cath placed   Bronched yesterday 1/3 - minimal secretions with rust-tinged sputum   ESBL-E Coli bacteremia on meropenam    - POD#7 Atrial Fib ,remains intubated weaning IABP 1:3,off pressors on CRRT  -IABP removed.Remains on vent-Alex 10ppm,off Levo- CVP 10 / MAP 71 / Hct 25.6% / Lactate 1.7-Atrial Fibrillation  -Intubated on Alex 10ppm, gtt, BP better-AF~~90's on Amio-had bronch still febrile Tmax 70184/-Extubated awake alert on HFNC AF,on Dobutrex-CRRT,Cultures no growth    01/10-OOB on BiPAP Sinus Rhythm on -SR/ MAP 57/ CVP 10/  Hct 26.7 / Lact 1.4  -s/p EDU/DCCV-Sinus rhythm on -SR / MAP 52 / CVP 12/ Hct 25.8 / Lact 0.7-had bronch with BAL Left lung  -Sinus rhythm on -for repeat Bronch-SR / MAP 67 / CVP 11 / Hct 27.4% / Lact 0.8  -s/p Trach on  TTE EF 55%-SR / CVP 2 / MAP 63 / Hct 25.9% / Lactate 0.9  -Awake on Trach collar off  c/o buttock pain had bronch yesterday-BAL-Sinus rhythm  -Sinus rhythm on vent at night-BP stable may need to increase hydrallazine 25 mg q8  -TTE EF 60% still needs Vent support at night Bradycardic trial of Bryce now back on   -Awake alert Sinus rhythm BP elevated  remains on ,Nocturnal vent support  resume Hydralazine 25mg q8  -Reverted to AF rvr  -s/p BRonc/BAL debridement sacral decub  -Awake noted AF RVR no further bradyarrhythmias-Start BBlockers      # Acute on Chronic Systolic Heart Failure now improved  EF-35% ,severe TR  Had HD post op  GDMT post op  LVEF improved post bypass but now at 23-30%-BiV dysfunction on  now off pressors  -TTE EF 40%,trace effusion  ECG c/w pericarditis-was on colchicine   01/15-TTE EF 55%  -TTE EF 60%   -Normal LV systolic function Moderate pericardial effusion    Vascular evaluated  AVGraft /?steal causing RV failure-'' Very low suspicion of steal Sd and AVF causing current clinical state. ''   VA Duplex Hemodialysis Access, Left (25 @ 13:35) >   Arterial flow volume of 1301 mL/m, and the venous flow volume of  1492 mL/m are consistent with a normally functioning dialysis access  fistula.      # Ascites  Hx chronic ascites  Has drainage q4-6 weeks  Last drained -4600mL  ? ascites related to severe TR  Had yzwrvnrddkwm-Diskoez-cg growth   CT Abdomen 01/10-Large volume ascites-  US abdomen--Small to moderate volume abdominal/pelvic ascites      # ESRD  Now off CRRT transition to HD

## 2025-02-05 NOTE — PROGRESS NOTE ADULT - ASSESSMENT
54M with PMH of HTN, HLD, ESRD on HD MWF via LUE AVF, ascites (requiring repeat paracenteses q4-6wks), NICM with moderate LV dysfunction w/ EF 35-40%(2023) and CAD s/p atherectomy + SALLIE to D1(4/11/2024) presents to [facility] transferred from [facility] for CABG eval  Nephro on [facility] for HD    ESRD on HD   Regular schedule MWF   Access LUE AVF  [facility]  Nephrologist Dr. Bailey  S/p HD on 01/29, 2/3  HD today per cristin  Keep MAP>65  Renal Diet  Consent in physical chart    Hyper/Hypokalemia  Monitor K, will change dialysate fluid as needed    HTN  bp fluctuating; acceptable  monitor bp     CKD MBD  Can send a PTH  Ca and Phos daily    Anemia  CRISTIANE with HD  Transfuse if hgb <7    CAD with multivessel disease  s/p CABG 12/30  CTICU following    Hypoxic Resp failure requiring Trach  Per surgery  S/p bronchoscopy, pulm following

## 2025-02-06 LAB
ALBUMIN SERPL ELPH-MCNC: 2.7 G/DL — LOW (ref 3.3–5)
ALP SERPL-CCNC: 113 U/L — SIGNIFICANT CHANGE UP (ref 40–120)
ALT FLD-CCNC: 8 U/L — LOW (ref 10–45)
ANISOCYTOSIS BLD QL: SIGNIFICANT CHANGE UP
AST SERPL-CCNC: 16 U/L — SIGNIFICANT CHANGE UP (ref 10–40)
BASOPHILS # BLD AUTO: 0.08 K/UL — SIGNIFICANT CHANGE UP (ref 0–0.2)
BASOPHILS NFR BLD AUTO: 0.9 % — SIGNIFICANT CHANGE UP (ref 0–2)
BILIRUB SERPL-MCNC: 0.4 MG/DL — SIGNIFICANT CHANGE UP (ref 0.2–1.2)
BUN SERPL-MCNC: 31 MG/DL — HIGH (ref 7–23)
CALCIUM SERPL-MCNC: 8.7 MG/DL — SIGNIFICANT CHANGE UP (ref 8.4–10.5)
CHLORIDE SERPL-SCNC: 93 MMOL/L — LOW (ref 96–108)
CO2 SERPL-SCNC: 26 MMOL/L — SIGNIFICANT CHANGE UP (ref 22–31)
CREAT SERPL-MCNC: 4.45 MG/DL — HIGH (ref 0.5–1.3)
CULTURE RESULTS: ABNORMAL
EGFR: 15 ML/MIN/1.73M2 — LOW
EGFR: 15 ML/MIN/1.73M2 — LOW
ELLIPTOCYTES BLD QL SMEAR: SLIGHT — SIGNIFICANT CHANGE UP
EOSINOPHIL # BLD AUTO: 0.41 K/UL — SIGNIFICANT CHANGE UP (ref 0–0.5)
EOSINOPHIL NFR BLD AUTO: 4.4 % — SIGNIFICANT CHANGE UP (ref 0–6)
GIANT PLATELETS BLD QL SMEAR: PRESENT — SIGNIFICANT CHANGE UP
GLUCOSE SERPL-MCNC: 135 MG/DL — HIGH (ref 70–99)
HCT VFR BLD CALC: 24 % — LOW (ref 39–50)
HGB BLD-MCNC: 7.6 G/DL — LOW (ref 13–17)
LYMPHOCYTES # BLD AUTO: 0.25 K/UL — LOW (ref 1–3.3)
LYMPHOCYTES # BLD AUTO: 2.7 % — LOW (ref 13–44)
MACROCYTES BLD QL: SIGNIFICANT CHANGE UP
MAGNESIUM SERPL-MCNC: 2.2 MG/DL — SIGNIFICANT CHANGE UP (ref 1.6–2.6)
MANUAL SMEAR VERIFICATION: SIGNIFICANT CHANGE UP
MCHC RBC-ENTMCNC: 31.7 G/DL — LOW (ref 32–36)
MCV RBC AUTO: 96 FL — SIGNIFICANT CHANGE UP (ref 80–100)
MICROCYTES BLD QL: SLIGHT — SIGNIFICANT CHANGE UP
MONOCYTES # BLD AUTO: 0.9 K/UL — SIGNIFICANT CHANGE UP (ref 0–0.9)
MONOCYTES NFR BLD AUTO: 9.7 % — SIGNIFICANT CHANGE UP (ref 2–14)
NEUTROPHILS # BLD AUTO: 7.64 K/UL — HIGH (ref 1.8–7.4)
NEUTROPHILS NFR BLD AUTO: 82.3 % — HIGH (ref 43–77)
ORGANISM # SPEC MICROSCOPIC CNT: ABNORMAL
OVALOCYTES BLD QL SMEAR: SLIGHT — SIGNIFICANT CHANGE UP
PHOSPHATE SERPL-MCNC: 2.9 MG/DL — SIGNIFICANT CHANGE UP (ref 2.5–4.5)
PLAT MORPH BLD: NORMAL — SIGNIFICANT CHANGE UP
PLATELET # BLD AUTO: 195 K/UL — SIGNIFICANT CHANGE UP (ref 150–400)
POIKILOCYTOSIS BLD QL AUTO: SLIGHT — SIGNIFICANT CHANGE UP
POLYCHROMASIA BLD QL SMEAR: SLIGHT — SIGNIFICANT CHANGE UP
POTASSIUM SERPL-MCNC: 4 MMOL/L — SIGNIFICANT CHANGE UP (ref 3.5–5.3)
POTASSIUM SERPL-SCNC: 4 MMOL/L — SIGNIFICANT CHANGE UP (ref 3.5–5.3)
PROT SERPL-MCNC: 5.9 G/DL — LOW (ref 6–8.3)
RBC # BLD: 2.5 M/UL — LOW (ref 4.2–5.8)
RBC # FLD: 21.4 % — HIGH (ref 10.3–14.5)
SCHISTOCYTES BLD QL AUTO: SLIGHT — SIGNIFICANT CHANGE UP
SODIUM SERPL-SCNC: 131 MMOL/L — LOW (ref 135–145)
SPECIMEN SOURCE: SIGNIFICANT CHANGE UP
VANCOMYCIN TROUGH SERPL-MCNC: 8.9 UG/ML — LOW (ref 10–20)
WBC # BLD: 9.28 K/UL — SIGNIFICANT CHANGE UP (ref 3.8–10.5)
WBC # FLD AUTO: 9.28 K/UL — SIGNIFICANT CHANGE UP (ref 3.8–10.5)

## 2025-02-06 PROCEDURE — 99291 CRITICAL CARE FIRST HOUR: CPT | Mod: 25

## 2025-02-06 PROCEDURE — 99232 SBSQ HOSP IP/OBS MODERATE 35: CPT

## 2025-02-06 PROCEDURE — 71045 X-RAY EXAM CHEST 1 VIEW: CPT | Mod: 26,76

## 2025-02-06 PROCEDURE — G0545: CPT

## 2025-02-06 PROCEDURE — 31622 DX BRONCHOSCOPE/WASH: CPT

## 2025-02-06 RX ORDER — LEVALBUTEROL HYDROCHLORIDE 1.25 MG/3ML
0.63 SOLUTION RESPIRATORY (INHALATION) EVERY 8 HOURS
Refills: 0 | Status: DISCONTINUED | OUTPATIENT
Start: 2025-02-06 | End: 2025-02-12

## 2025-02-06 RX ORDER — VANCOMYCIN HCL IN 5 % DEXTROSE 1.5G/250ML
1000 PLASTIC BAG, INJECTION (ML) INTRAVENOUS EVERY 24 HOURS
Refills: 0 | Status: DISCONTINUED | OUTPATIENT
Start: 2025-02-06 | End: 2025-02-10

## 2025-02-06 RX ORDER — FENTANYL CITRATE-0.9 % NACL/PF 100MCG/2ML
100 SYRINGE (ML) INTRAVENOUS ONCE
Refills: 0 | Status: DISCONTINUED | OUTPATIENT
Start: 2025-02-06 | End: 2025-02-06

## 2025-02-06 RX ORDER — LIDOCAINE HCL/PF 10 MG/ML
8 VIAL (ML) INJECTION ONCE
Refills: 0 | Status: COMPLETED | OUTPATIENT
Start: 2025-02-06 | End: 2025-02-06

## 2025-02-06 RX ORDER — FENTANYL CITRATE-0.9 % NACL/PF 100MCG/2ML
50 SYRINGE (ML) INTRAVENOUS ONCE
Refills: 0 | Status: DISCONTINUED | OUTPATIENT
Start: 2025-02-06 | End: 2025-02-06

## 2025-02-06 RX ORDER — PROPOFOL 10 MG/ML
100 INJECTION, EMULSION INTRAVENOUS ONCE
Refills: 0 | Status: COMPLETED | OUTPATIENT
Start: 2025-02-06 | End: 2025-02-06

## 2025-02-06 RX ADMIN — METHOCARBAMOL 500 MILLIGRAM(S): 500 TABLET, FILM COATED ORAL at 21:18

## 2025-02-06 RX ADMIN — LEVALBUTEROL HYDROCHLORIDE 0.63 MILLIGRAM(S): 1.25 SOLUTION RESPIRATORY (INHALATION) at 22:01

## 2025-02-06 RX ADMIN — MIRTAZAPINE 7.5 MILLIGRAM(S): 30 TABLET, FILM COATED ORAL at 13:46

## 2025-02-06 RX ADMIN — Medication 15 MILLILITER(S): at 06:09

## 2025-02-06 RX ADMIN — Medication 100 MICROGRAM(S): at 11:58

## 2025-02-06 RX ADMIN — Medication 15 MILLILITER(S): at 17:23

## 2025-02-06 RX ADMIN — ACETYLCYSTEINE 4 MILLILITER(S): 200 INHALANT RESPIRATORY (INHALATION) at 05:07

## 2025-02-06 RX ADMIN — METOPROLOL SUCCINATE 12.5 MILLIGRAM(S): 50 TABLET, EXTENDED RELEASE ORAL at 18:05

## 2025-02-06 RX ADMIN — Medication 50 MILLIGRAM(S): at 21:18

## 2025-02-06 RX ADMIN — METHOCARBAMOL 500 MILLIGRAM(S): 500 TABLET, FILM COATED ORAL at 06:09

## 2025-02-06 RX ADMIN — Medication 1 SPRAY(S): at 17:23

## 2025-02-06 RX ADMIN — ACETYLCYSTEINE 4 MILLILITER(S): 200 INHALANT RESPIRATORY (INHALATION) at 17:31

## 2025-02-06 RX ADMIN — Medication 1 APPLICATION(S): at 05:42

## 2025-02-06 RX ADMIN — INSULIN LISPRO 2: 100 INJECTION, SOLUTION INTRAVENOUS; SUBCUTANEOUS at 06:17

## 2025-02-06 RX ADMIN — Medication 250 MILLIGRAM(S): at 18:05

## 2025-02-06 RX ADMIN — INSULIN LISPRO 2: 100 INJECTION, SOLUTION INTRAVENOUS; SUBCUTANEOUS at 18:32

## 2025-02-06 RX ADMIN — Medication 2.5 MILLIGRAM(S): at 11:48

## 2025-02-06 RX ADMIN — Medication 50 MICROGRAM(S): at 10:12

## 2025-02-06 RX ADMIN — Medication 81 MILLIGRAM(S): at 13:46

## 2025-02-06 RX ADMIN — PROPOFOL 100 MILLIGRAM(S): 10 INJECTION, EMULSION INTRAVENOUS at 10:30

## 2025-02-06 RX ADMIN — AMIODARONE HYDROCHLORIDE 200 MILLIGRAM(S): 50 INJECTION, SOLUTION INTRAVENOUS at 06:09

## 2025-02-06 RX ADMIN — ACETYLCYSTEINE 4 MILLILITER(S): 200 INHALANT RESPIRATORY (INHALATION) at 11:47

## 2025-02-06 RX ADMIN — METOPROLOL SUCCINATE 12.5 MILLIGRAM(S): 50 TABLET, EXTENDED RELEASE ORAL at 06:09

## 2025-02-06 RX ADMIN — METHOCARBAMOL 500 MILLIGRAM(S): 500 TABLET, FILM COATED ORAL at 13:46

## 2025-02-06 RX ADMIN — HEPARIN SODIUM 5000 UNIT(S): 1000 INJECTION INTRAVENOUS; SUBCUTANEOUS at 06:09

## 2025-02-06 RX ADMIN — Medication 500 MILLIGRAM(S): at 13:50

## 2025-02-06 RX ADMIN — Medication 50 MICROGRAM(S): at 09:57

## 2025-02-06 RX ADMIN — Medication 5 MILLIGRAM(S): at 21:18

## 2025-02-06 RX ADMIN — Medication 2.5 MILLIGRAM(S): at 05:07

## 2025-02-06 RX ADMIN — Medication 40 MILLIGRAM(S): at 13:46

## 2025-02-06 RX ADMIN — HEPARIN SODIUM 5000 UNIT(S): 1000 INJECTION INTRAVENOUS; SUBCUTANEOUS at 21:18

## 2025-02-06 RX ADMIN — Medication 1 TABLET(S): at 13:45

## 2025-02-06 RX ADMIN — ATORVASTATIN CALCIUM 80 MILLIGRAM(S): 80 TABLET, FILM COATED ORAL at 21:18

## 2025-02-06 RX ADMIN — Medication 100 MICROGRAM(S): at 12:13

## 2025-02-06 RX ADMIN — HEPARIN SODIUM 5000 UNIT(S): 1000 INJECTION INTRAVENOUS; SUBCUTANEOUS at 13:45

## 2025-02-06 RX ADMIN — Medication 1 APPLICATION(S): at 21:15

## 2025-02-06 RX ADMIN — ACETYLCYSTEINE 4 MILLILITER(S): 200 INHALANT RESPIRATORY (INHALATION) at 22:01

## 2025-02-06 NOTE — PROGRESS NOTE ADULT - SUBJECTIVE AND OBJECTIVE BOX
[facility]    Dr. Nica Styles     Office [phone] (9 am to 5 pm)  Service: [phone] (5pm to 9am)  Also Available on TEAMS      RENAL PROGRESS NOTE: DATE OF SERVICE 02-06-25 @ 12:45    Patient is a 54y old  Male who presents with a chief complaint of CAD s/p CABG (04 Feb 2025 07:14)      Patient seen and examined at bedside. No chest pain/sob    VITALS:  T(F): 98.2 (02-06-25 @ 12:00), Max: 98.9 (02-05-25 @ 16:00)  HR: 74 (02-06-25 @ 12:03)  BP: 146/75 (02-06-25 @ 12:00)  RR: 20 (02-06-25 @ 12:00)  SpO2: 99% (02-06-25 @ 12:03)  Wt(kg): --    02-05 @ 07:01  -  02-06 @ 07:00  --------------------------------------------------------  IN: 1480 mL / OUT: 1000 mL / NET: 480 mL    02-06 @ 07:01  -  02-06 @ 12:45  --------------------------------------------------------  IN: 120 mL / OUT: 0 mL / NET: 120 mL          PHYSICAL EXAM:  Constitutional: NAD  Neck: No JVD  Respiratory: CTAB, no wheezes, rales or rhonchi  Cardiovascular: S1, S2, RRR  Gastrointestinal: BS+, soft, NT/ND  Extremities: No peripheral edema    Hospital Medications:   MEDICATIONS  (STANDING):  acetylcysteine 10%  Inhalation 4 milliLiter(s) Inhalation every 6 hours  albuterol    0.083% 2.5 milliGRAM(s) Nebulizer every 6 hours  aMIOdarone    Tablet 200 milliGRAM(s) Oral daily  ascorbic acid 500 milliGRAM(s) Oral daily  aspirin  chewable 81 milliGRAM(s) Oral daily  atorvastatin 80 milliGRAM(s) Oral at bedtime  chlorhexidine 0.12% Liquid 15 milliLiter(s) Oral Mucosa every 12 hours  chlorhexidine 2% Cloths 1 Application(s) Topical daily  chlorhexidine 4% Liquid 1 Application(s) Topical <User Schedule>  dextrose 50% Injectable 50 milliLiter(s) IV Push every 15 minutes  epoetin ignacia (EPOGEN) Injectable 10798 Unit(s) IV Push <User Schedule>  heparin   Injectable 5000 Unit(s) SubCutaneous every 8 hours  insulin lispro (ADMELOG) corrective regimen sliding scale   SubCutaneous every 6 hours  melatonin 5 milliGRAM(s) Oral at bedtime  methocarbamol 500 milliGRAM(s) Oral every 8 hours  metoprolol tartrate 12.5 milliGRAM(s) Oral every 12 hours  mirtazapine 7.5 milliGRAM(s) Oral daily  Nephro-elicia 1 Tablet(s) Oral daily  pantoprazole  Injectable 40 milliGRAM(s) IV Push daily  polyethylene glycol 3350 17 Gram(s) Oral two times a day  sodium chloride 0.65% Nasal 1 Spray(s) Both Nostrils every 12 hours  sodium chloride 0.9%. 1000 milliLiter(s) (10 mL/Hr) IV Continuous <Continuous>  traZODone 50 milliGRAM(s) Oral at bedtime      LABS:  02-06    131[L]  |  93[L]  |  31[H]  ----------------------------<  135[H]  4.0   |  26  |  4.45[H]    Ca    8.7      06 Feb 2025 00:20  Phos  2.9     02-06  Mg     2.2     02-06    TPro  5.9[L]  /  Alb  2.7[L]  /  TBili  0.4  /  DBili      /  AST  16  /  ALT  8[L]  /  AlkPhos  113  02-06    Creatinine Trend: 4.45 <--, 5.10 <--, 4.58 <--, 4.12 <--, 5.74 <--, 5.38 <--, 4.78 <--, 3.78 <--, 5.05 <--    Albumin: 2.7 g/dL (02-06 @ 00:20)  Phosphorus: 2.9 mg/dL (02-06 @ 00:20)                              7.6    9.28  )-----------( 195      ( 06 Feb 2025 00:20 )             24.0     Urine Studies:  Urinalysis - [02-06-25 @ 00:20]      Color  / Appearance  / SG  / pH       Gluc 135 / Ketone   / Bili  / Urobili        Blood  / Protein  / Leuk Est  / Nitrite       RBC  / WBC  / Hyaline  / Gran  / Sq Epi  / Non Sq Epi  / Bacteria       Iron 29, TIBC 143, %sat 20      [12-19-24 @ 05:00]  Ferritin 904      [12-19-24 @ 05:00]  TSH 4.75      [12-24-24 @ 06:50]        RADIOLOGY & ADDITIONAL STUDIES:

## 2025-02-06 NOTE — PROVIDER CONTACT NOTE (OTHER) - BACKGROUND
Patient has been getting serial bronchoscopies given lung status and imaging. Patient with ESRD and received HD MWF via L AVF. Patient has been trached here in the [facility] a #8 Port[place].

## 2025-02-06 NOTE — CHART NOTE - NSCHARTNOTEFT_GEN_A_CORE
54M with PMH of HTN, HLD, ESRD on HD MWF via LUE AVF, ascites (requiring repeat paracenteses q4-6wks), NICM with moderate LV dysfunction w/ EF 35-40%(2023) and CAD s/p atherectomy + SALLIE to D1(4/11/2024) presents to [facility] transferred from [facility]. Patient initially presented to [facility] ED with shortness of breath. Of note he was recently admitted from 09/27-12/02 for acute cholecystitis s/p cholecystectomy. In [facility] ED, pt was hypoxic to 86% on RA, trop 1.7K, EKG no new changes, CXR fluid overload. Admitted for AHRF 2/2 fluid overload. Pt received HD on 12/18, 12/19, 12/20 and 12/22. DAPT was resumed, TTE showed improvement in LVEF to 40-45%. Stress test was abnormal with 1mm inferior STD, reversible ischemia in the inferior wall and fixed defects in the lateral, anterior and apical walls with post stress EF of 24%. Pt was then transferred to [facility] for cardiac cath which showed pLAD 70% ISR, mLCx 70%, D1 severe dz. Patient now transferred to [facility] CTS Dr. Rivers for CABG eval. Patient denies any current SOB or chest pain on admission.    12/30/24 S/P CABG x 3    1/2: Intubated for hypoxia & left lung white out s/p awake bronch with removal of copious mucopurulent secretions  1/4: S/P IABP insertion, sepsis/septic shock due to E coli, ESBL pneumonia, collapsed Left Lung, s/p multiple bronch   1/18: tracheostomy tube placement   1/22: VRE E. Faecium Bcx  1/24: +HSV PCR BAL  1/26: tissue cx from back abscess - Serratia   1/27-29: intermittent bradycardia/junctional episodes with hypotension  2/4: bronch for Left lung collapse wound vac, 2decho w/ moderate pericardial effusion - similar as before, US abd: small - moderate ascites - monitor at this time; wound vac placed for sacral wounds    Current out- patient pain regimen: none  Out Patient Pain Management provider: none  [facility] Prescription Monitoring Program: Reference #[id number]    Current Pain Score: 0/10    Pt with sacral pain and burning.    Continue Oxy IR 5 mg  via TF Q 4 hours PRN.  Lyrica 50 mg Q 8 hours was discontinued on 2/3/25.  Continue robaxin 500 mg Q 8 hours.  Continue trazodone 50 mg Q HS.    Monitor for sedation and respiratory depression.  Bowel regimen.  Incentive spirometer.  OOB/ PT per primary team.    Discharge with a narcan rescue kit (naloxone 4 mg/ 0.1 ml nasal spray- 1 spray Q 2-3 minutes alternating between nostrils).  If continued pain management is needed after discharge, can see Dr Paul Spencer [phone].    Signing off.      [facility]  [phone]

## 2025-02-06 NOTE — PROGRESS NOTE ADULT - SUBJECTIVE AND OBJECTIVE BOX
Patient seen and examined at the bedside.    Remains critically ill on continuous ICU monitoring, at risk for life threatening decompensation.  All Labs, data reviewed. Plan of care discussed in length during multi-disciplinary ICU rounds.       Brief Summary:  53 yo M with multiple medical problems including CAD s/p PCI in 2024 s/p CABG x 3 on 24  1: Intubated for hypoxia & left lung white out s/p awake bronch with removal of copious mucopurulent secretions  : s/p IABP insertion, sepsis/septic shock due to E coli   ESBL pneumonia, collapsed Left Lung, s/p multiple bronch    tracheostomy tube placement    VRE E. Faecium Bcx   +HSV PCR BAL   tissue cx from back abscess - Serratia   - - intermittent bradycardia/junctional episodes with hypotension  2/3: off , off theophylline. iHD 1L UF  : bronch for Left lung collapse wound vac, 2decho w/ moderate pericardial effusion - similar as before, US abd: small - moderate ascites - monitor at this time.  Wound vac placed for sacral wounds  : iHD-1L UF    24 Hour events:  iHD yesterday 1L UF removal  PT/OT, OOBC as tolerated  : MRSA and serratia from cyst on the back. continue levaquin + Vanc x 5 days    Objective:  Vital Signs Last 24 Hrs  T(C): 36.7 (2025 08:00), Max: 37.2 (2025 16:00)  T(F): 98.1 (2025 08:00), Max: 98.9 (2025 16:00)  HR: 102 (2025 07:00) (62 - 103)  BP: 141/72 (2025 07:00) (117/73 - 163/73)  BP(mean): 102 (2025 07:00) (85 - 105)  RR: 22 (2025 07:00) (14 - 29)  SpO2: 95% (2025 07:00) (93% - 100%)    Parameters below as of 2025 08:00  Patient On (Oxygen Delivery Method): ventilator    O2 Concentration (%): 40    Mode: standby  FiO2: 40    Physical Exam:  General: Alert and interactive,   Neurology: Oriented, following commands. ambulates  Respiratory: Breath sounds bilaterally, trach collar  CV: Afib  Abdominal: Soft, Nontender  Extremities: Warm, well-perfused  Back: open abscess/wound to right paralumbar area, indurated, no fluctuance, no crepitus; sacral wound   Right arm tender, but non-infectious appearing  -------------------------------------------------------------------------------------------------------------------------------    Labs:                        7.6    9.28  )-----------( 195      ( 2025 00:20 )             24.0     02-06    131[L]  |  93[L]  |  31[H]  ----------------------------<  135[H]  4.0   |  26  |  4.45[H]    Ca    8.7      2025 00:20  Phos  2.9     02-06  Mg     2.2     02-06    TPro  5.9[L]  /  Alb  2.7[L]  /  TBili  0.4  /  DBili  x   /  AST  16  /  ALT  8[L]  /  AlkPhos  113  02-06    LIVER FUNCTIONS - ( 2025 00:20 )  Alb: 2.7 g/dL / Pro: 5.9 g/dL / ALK PHOS: 113 U/L / ALT: 8 U/L / AST: 16 U/L / GGT: x           ALL MEDICATIONS   MEDICATIONS  (STANDING):  acetylcysteine 10%  Inhalation 4 milliLiter(s) Inhalation every 6 hours  albuterol    0.083% 2.5 milliGRAM(s) Nebulizer every 6 hours  aMIOdarone    Tablet 200 milliGRAM(s) Oral daily  ascorbic acid 500 milliGRAM(s) Oral daily  aspirin  chewable 81 milliGRAM(s) Oral daily  atorvastatin 80 milliGRAM(s) Oral at bedtime  chlorhexidine 0.12% Liquid 15 milliLiter(s) Oral Mucosa every 12 hours  chlorhexidine 2% Cloths 1 Application(s) Topical daily  chlorhexidine 4% Liquid 1 Application(s) Topical <User Schedule>  dextrose 50% Injectable 50 milliLiter(s) IV Push every 15 minutes  epoetin ignacia (EPOGEN) Injectable 93958 Unit(s) IV Push <User Schedule>  heparin   Injectable 5000 Unit(s) SubCutaneous every 8 hours  insulin lispro (ADMELOG) corrective regimen sliding scale   SubCutaneous every 6 hours  melatonin 5 milliGRAM(s) Oral at bedtime  methocarbamol 500 milliGRAM(s) Oral every 8 hours  metoprolol tartrate 12.5 milliGRAM(s) Oral every 12 hours  mirtazapine 7.5 milliGRAM(s) Oral daily  Nephro-elicia 1 Tablet(s) Oral daily  pantoprazole  Injectable 40 milliGRAM(s) IV Push daily  polyethylene glycol 3350 17 Gram(s) Oral two times a day  sodium chloride 0.65% Nasal 1 Spray(s) Both Nostrils every 12 hours  sodium chloride 0.9%. 1000 milliLiter(s) (10 mL/Hr) IV Continuous <Continuous>  traZODone 50 milliGRAM(s) Oral at bedtime    MEDICATIONS  (PRN):  acetaminophen     Tablet .. 650 milliGRAM(s) Oral every 6 hours PRN Mild Pain (1 - 3)  lidocaine/prilocaine Cream 1 Application(s) Topical daily PRN pre-HD  oxyCODONE    IR 5 milliGRAM(s) Oral every 4 hours PRN Moderate Pain (4 - 6)  sodium chloride 0.9% lock flush 10 milliLiter(s) IV Push every 1 hour PRN Pre/post blood products, medications, blood draw, and to maintain line patency  ------------------------------------------------------------------------------------------------------------------------------  Assessment:  53 yo M s/p CABG x 3 on 24    Postop acute respiratory failure  Acute blood loss anemia  Ascites    ESRD on iHD   E coli bacteremia  pneumonia  VRE E. Faecium   HSV infection  Tracheomalacia     Plan:  ***Neuro***  Postoperative acute pain control with Robaxin, Tylenol, and Oxycodone prn  Trazodone and Melatonin nightly  PT ongoing    ***Cardiovascular***  s/p CABG x 3  A fib s/p cardioversion  Intermittent bradycardia/junction with hypotension - off Theophyline   Titrate Lopressor   TTE  LVEF 60%, mod RA pericardial effusion   PO Amiodarone for afib prophylaxis   ASA / Statin daily  2decho w/ moderate pericardial effusion unchanged    ***Pulmonary***  Postoperative acute respiratory failure  Tracheostomy    s/p Bronchoscopy . , , ,   Mucomyst albuterol  Left lung PNA, + e coli ESBL - cleared but now with VRE   Tolerates trach collar with high flow, keep on a vent at night  On HT 3% nebs and albuterol  Bronchoscopy by IP on  - no concerns for tracheomalacia  s/p bronch for  for Left lung collapse    ***GI***  On Tube feeds at goal, change to vital per nutrition consult  Protonix for stress ulcer prophylaxis.   Ascites: Last paracentesis , monitor     ***Renal***  ESRD - tolerating iHD ([facility])   Nephro-Elicia for renal support  iHD  ([facility])    ***ID***  Monitor cultures   BAL - commensal manjit    VRE BAL - Linezolid    Acyclovir for + HSV in BAL   serratia on tissue cx and h/o ESBL PNA - Meropenem until : MRSA and serratia from cyst on the back. continue levaquin + Vanc x 5 days    ***Endocrine***  Hyperglycemia - Insulin sliding scale    ***Hematology***  Acute blood loss anemia  CBC, coags  Continue Epogen.  Heparin SQ for DVT  No AC s/p cardioversion. not on AC due to moderate pericardial effusion    Wound:  s/p sacral debridement on  . wound vac placed on         I, Stella Hodges MD, personally performed the services described in this documentation. All medical record entries made by the scribe were at my direction and in my presence. I have reviewed the chart and agree that the record reflects my personal performance and is accurate and complete  Electronically signed:   Stella Hodges MD  CT ICU attending     ICU time: ** mins     By signing my name below, I, Baldemar Hernandez, attest that this documentation has been prepared under the direction and in the presence of Stella Hodges MD  Electronically signed: Baldemar Hernandez, 25 @ 08:07             Patient seen and examined at the bedside.    Remains critically ill on continuous ICU monitoring, at risk for life threatening decompensation.  All Labs, data reviewed. Plan of care discussed in length during multi-disciplinary ICU rounds.       Brief Summary:  53 yo M with multiple medical problems including CAD s/p PCI in 2024 s/p CABG x 3 on 24  1: Intubated for hypoxia & left lung white out s/p awake bronch with removal of copious mucopurulent secretions  : s/p IABP insertion, sepsis/septic shock due to E coli   ESBL pneumonia, collapsed Left Lung, s/p multiple bronch    tracheostomy tube placement    VRE E. Faecium Bcx   +HSV PCR BAL   tissue cx from back abscess - Serratia   - - intermittent bradycardia/junctional episodes with hypotension  2/3: off , off theophylline. iHD 1L UF  : bronch for Left lung collapse wound vac, 2decho w/ moderate pericardial effusion - similar as before, US abd: small - moderate ascites - monitor at this time.  Wound vac placed for sacral wounds  : iHD-1L UF  : bronch today off positive pressure. : MRSA and serratia from cyst on the back. continue levaquin + Vanc x 5 days    24 Hour events:  iHD yesterday 1L UF removal  PT/OT, OOBC as tolerated  : MRSA and serratia from cyst on the back. continue levaquin + Vanc x 5 days, vanc per level  NPO for bronch today      Objective:  Vital Signs Last 24 Hrs  T(C): 36.7 (2025 08:00), Max: 37.2 (2025 16:00)  T(F): 98.1 (2025 08:00), Max: 98.9 (2025 16:00)  HR: 102 (2025 07:00) (62 - 103)  BP: 141/72 (2025 07:00) (117/73 - 163/73)  BP(mean): 102 (2025 07:00) (85 - 105)  RR: 22 (2025 07:00) (14 - 29)  SpO2: 95% (2025 07:00) (93% - 100%)    Parameters below as of 2025 08:00  Patient On (Oxygen Delivery Method): ventilator    O2 Concentration (%): 40    Mode: standby  FiO2: 40    Physical Exam:  General: Alert and interactive,   Neurology: Oriented, following commands. ambulates  Respiratory: Breath sounds bilaterally, trach collar  CV: Afib  Abdominal: Soft, Nontender  Extremities: Warm, well-perfused  Back: open abscess/wound to right paralumbar area, indurated, no fluctuance, no crepitus; sacral wound   Right arm tender, but non-infectious appearing  -------------------------------------------------------------------------------------------------------------------------------    Labs:                        7.6    9.28  )-----------( 195      ( 2025 00:20 )             24.0     02-06    131[L]  |  93[L]  |  31[H]  ----------------------------<  135[H]  4.0   |  26  |  4.45[H]    Ca    8.7      2025 00:20  Phos  2.9     02-06  Mg     2.2     02-06    TPro  5.9[L]  /  Alb  2.7[L]  /  TBili  0.4  /  DBili  x   /  AST  16  /  ALT  8[L]  /  AlkPhos  113  02-06    LIVER FUNCTIONS - ( 2025 00:20 )  Alb: 2.7 g/dL / Pro: 5.9 g/dL / ALK PHOS: 113 U/L / ALT: 8 U/L / AST: 16 U/L / GGT: x           ALL MEDICATIONS   MEDICATIONS  (STANDING):  acetylcysteine 10%  Inhalation 4 milliLiter(s) Inhalation every 6 hours  albuterol    0.083% 2.5 milliGRAM(s) Nebulizer every 6 hours  aMIOdarone    Tablet 200 milliGRAM(s) Oral daily  ascorbic acid 500 milliGRAM(s) Oral daily  aspirin  chewable 81 milliGRAM(s) Oral daily  atorvastatin 80 milliGRAM(s) Oral at bedtime  chlorhexidine 0.12% Liquid 15 milliLiter(s) Oral Mucosa every 12 hours  chlorhexidine 2% Cloths 1 Application(s) Topical daily  chlorhexidine 4% Liquid 1 Application(s) Topical <User Schedule>  dextrose 50% Injectable 50 milliLiter(s) IV Push every 15 minutes  epoetin ignacia (EPOGEN) Injectable 68244 Unit(s) IV Push <User Schedule>  heparin   Injectable 5000 Unit(s) SubCutaneous every 8 hours  insulin lispro (ADMELOG) corrective regimen sliding scale   SubCutaneous every 6 hours  melatonin 5 milliGRAM(s) Oral at bedtime  methocarbamol 500 milliGRAM(s) Oral every 8 hours  metoprolol tartrate 12.5 milliGRAM(s) Oral every 12 hours  mirtazapine 7.5 milliGRAM(s) Oral daily  Nephro-elicia 1 Tablet(s) Oral daily  pantoprazole  Injectable 40 milliGRAM(s) IV Push daily  polyethylene glycol 3350 17 Gram(s) Oral two times a day  sodium chloride 0.65% Nasal 1 Spray(s) Both Nostrils every 12 hours  sodium chloride 0.9%. 1000 milliLiter(s) (10 mL/Hr) IV Continuous <Continuous>  traZODone 50 milliGRAM(s) Oral at bedtime    MEDICATIONS  (PRN):  acetaminophen     Tablet .. 650 milliGRAM(s) Oral every 6 hours PRN Mild Pain (1 - 3)  lidocaine/prilocaine Cream 1 Application(s) Topical daily PRN pre-HD  oxyCODONE    IR 5 milliGRAM(s) Oral every 4 hours PRN Moderate Pain (4 - 6)  sodium chloride 0.9% lock flush 10 milliLiter(s) IV Push every 1 hour PRN Pre/post blood products, medications, blood draw, and to maintain line patency  ------------------------------------------------------------------------------------------------------------------------------  Assessment:  53 yo M s/p CABG x 3 on 24    Postop acute respiratory failure  Acute blood loss anemia  Ascites    ESRD on iHD   E coli bacteremia  pneumonia  VRE E. Faecium   HSV infection  Tracheomalacia     Plan:  ***Neuro***  Postoperative acute pain control with Robaxin, Tylenol, and Oxycodone prn  Trazodone and Melatonin nightly  PT ongoing    ***Cardiovascular***  s/p CABG x 3  A fib s/p cardioversion  cannot be on AC due to moderate pericardial effusion  tolerating betablocker , PO Amio for Afib  TTE  LVEF 60%, mod RA pericardial effusion   ASA / Statin daily    ***Pulmonary***  Postoperative acute respiratory failure  Tracheostomy    requiring frequent bronchs for left lobe collpase  Mucomyst, duo nebs, HTS to help mobilize secretions  Left lung PNA, + e coli ESBL - cleared but now with VRE   Tolerates trach collar with high flow, keep on a vent at night  Bronchoscopy by IP on  - no concerns for bronchomalacia  repat bronch w/ IP on  - to assess bronchiectasia vs malacia    ***GI***  On Tube feeds at goal  Protonix for stress ulcer prophylaxis.   Ascites: Last paracentesis , monitor repeat US w/ mild-moderate     ***Renal***  ESRD - tolerating iHD (MWF)   Nephro-Elicia for renal support  iHD  ([facility])    ***ID***  Monitor cultures   BAL - commensal manjit    VRE BAL - Linezolid    Acyclovir for + HSV in BAL   serratia on tissue cx and h/o ESBL PNA - Meropenem until : MRSA and serratia from cyst on the back. continue levaquin + Vanc x 5 days    ***Endocrine***  Hyperglycemia - Insulin sliding scale    ***Hematology***  Acute blood loss anemia  CBC, coags  Continue Epogen.  Heparin SQ for DVT  No AC s/p cardioversion. not on AC due to moderate pericardial effusion    Wound:  s/p sacral debridement on  . wound vac placed on         I, Stella Hodges MD, personally performed the services described in this documentation. All medical record entries made by the scribe were at my direction and in my presence. I have reviewed the chart and agree that the record reflects my personal performance and is accurate and complete  Electronically signed:   Stella Hodges MD  CT ICU attending     ICU time: 56 mins     By signing my name below, I, Baldemar Hernandez, attest that this documentation has been prepared under the direction and in the presence of Stella Hodges MD  Electronically signed: Baldemar Hernandez, 25 @ 08:07

## 2025-02-06 NOTE — PROGRESS NOTE ADULT - SUBJECTIVE AND OBJECTIVE BOX
Patient is a 54y old  Male who presents with a chief complaint of CAD s/p CABG (04 Feb 2025 07:14)    Being followed by ID for        Interval history:  No other acute events      ROS:  No cough,SOB,CP  No N/V/D  No abd pain  No urinary complaints  No HA  No joint or limb pain  No other complaints    PAST MEDICAL & SURGICAL HISTORY:  Type 2 diabetes mellitus      Hypertension      End stage renal disease  started HD 2/2019 T, Th, Sat via right chest permacath      Bone spur  right shoulder- hx of - sx done      Anemia      Injury of right wrist  hx of at age 14      History of hyperkalemia  before HD- K-6.2 - to repeat in am of sx, pt had HD today      S/P arthroscopy of right shoulder  2010      History of vascular access device  right chest permacath - 2/2019      H/O right wrist surgery  tendons repair - at age 14      AV fistula      H/O ventral hernia repair      S/P cholecystectomy        Allergies    No Known Allergies    Intolerances      Antimicrobials:      MEDICATIONS  (STANDING):  acetylcysteine 10%  Inhalation 4 milliLiter(s) Inhalation every 6 hours  albuterol    0.083% 2.5 milliGRAM(s) Nebulizer every 6 hours  aMIOdarone    Tablet 200 milliGRAM(s) Oral daily  ascorbic acid 500 milliGRAM(s) Oral daily  aspirin  chewable 81 milliGRAM(s) Oral daily  atorvastatin 80 milliGRAM(s) Oral at bedtime  chlorhexidine 0.12% Liquid 15 milliLiter(s) Oral Mucosa every 12 hours  chlorhexidine 2% Cloths 1 Application(s) Topical daily  chlorhexidine 4% Liquid 1 Application(s) Topical <User Schedule>  dextrose 50% Injectable 50 milliLiter(s) IV Push every 15 minutes  epoetin ignacia (EPOGEN) Injectable 94820 Unit(s) IV Push <User Schedule>  heparin   Injectable 5000 Unit(s) SubCutaneous every 8 hours  insulin lispro (ADMELOG) corrective regimen sliding scale   SubCutaneous every 6 hours  melatonin 5 milliGRAM(s) Oral at bedtime  methocarbamol 500 milliGRAM(s) Oral every 8 hours  metoprolol tartrate 12.5 milliGRAM(s) Oral every 12 hours  mirtazapine 7.5 milliGRAM(s) Oral daily  Nephro-elicia 1 Tablet(s) Oral daily  pantoprazole  Injectable 40 milliGRAM(s) IV Push daily  polyethylene glycol 3350 17 Gram(s) Oral two times a day  sodium chloride 0.65% Nasal 1 Spray(s) Both Nostrils every 12 hours  sodium chloride 0.9%. 1000 milliLiter(s) (10 mL/Hr) IV Continuous <Continuous>  traZODone 50 milliGRAM(s) Oral at bedtime      Vital Signs Last 24 Hrs  T(C): 36.7 (02-06-25 @ 08:00), Max: 37.2 (02-05-25 @ 16:00)  T(F): 98.1 (02-06-25 @ 08:00), Max: 98.9 (02-05-25 @ 16:00)  HR: 87 (02-06-25 @ 08:00) (62 - 103)  BP: 159/89 (02-06-25 @ 08:00) (117/73 - 163/73)  BP(mean): 119 (02-06-25 @ 08:00) (85 - 119)  RR: 17 (02-06-25 @ 08:00) (14 - 29)  SpO2: 97% (02-06-25 @ 08:00) (93% - 100%)    Physical Exam:    Constitutional well preserved,comfortable,pleasant    HEENT PERRLA EOMI,No pallor or icterus    No oral exudate or erythema    Neck supple no JVD or LN    Chest Good AE,CTA    CVS RRR S1 S2 WNl No murmur or rub or gallop    Abd soft BS normal No tenderness no masses    Ext No cyanosis clubbing or edema    IV site no erythema tenderness or discharge    Joints no swelling or LOM    CNS AAO X 3 no focal    Lab Data:                          7.6    9.28  )-----------( 195      ( 06 Feb 2025 00:20 )             24.0       02-06    131[L]  |  93[L]  |  31[H]  ----------------------------<  135[H]  4.0   |  26  |  4.45[H]    Ca    8.7      06 Feb 2025 00:20  Phos  2.9     02-06  Mg     2.2     02-06    TPro  5.9[L]  /  Alb  2.7[L]  /  TBili  0.4  /  DBili  x   /  AST  16  /  ALT  8[L]  /  AlkPhos  113  02-06          Drainage  02-01-25   Growth in fluid media only Staphylococcus aureus  Growth in fluid media only Serratia marcescens  --  Methicillin resistant Staphylococcus aureus  Serratia marcescens      Bronchial  01-30-25   Commensal manjit consistent with body site  --    Few polymorphonuclear leukocytes seen per low power field  Few Squamous epithelial cells seen per low power field  Few Yeast like cells seen per oil power field                    WBC Count: 9.28 (02-06-25 @ 00:20)  WBC Count: 11.12 (02-05-25 @ 00:19)  WBC Count: 11.39 (02-04-25 @ 00:31)  WBC Count: 10.02 (02-03-25 @ 00:20)  WBC Count: 9.68 (02-02-25 @ 00:21)  WBC Count: 11.02 (02-01-25 @ 00:42)  WBC Count: 9.40 (01-31-25 @ 00:30)       Bilirubin Total: 0.4 mg/dL (02-06-25 @ 00:20)  Aspartate Aminotransferase (AST/SGOT): 16 U/L (02-06-25 @ 00:20)  Alanine Aminotransferase (ALT/SGPT): 8 U/L (02-06-25 @ 00:20)  Alkaline Phosphatase: 113 U/L (02-06-25 @ 00:20)  Bilirubin Total: 0.4 mg/dL (02-05-25 @ 00:18)  Aspartate Aminotransferase (AST/SGOT): 17 U/L (02-05-25 @ 00:18)  Alanine Aminotransferase (ALT/SGPT): 7 U/L (02-05-25 @ 00:18)  Alkaline Phosphatase: 120 U/L (02-05-25 @ 00:18)  Bilirubin Total: 0.4 mg/dL (02-04-25 @ 12:35)  Aspartate Aminotransferase (AST/SGOT): 13 U/L (02-04-25 @ 12:35)  Alanine Aminotransferase (ALT/SGPT): 9 U/L (02-04-25 @ 12:35)  Alkaline Phosphatase: 133 U/L (02-04-25 @ 12:35)  Bilirubin Total: 0.4 mg/dL (02-04-25 @ 00:29)  Aspartate Aminotransferase (AST/SGOT): 15 U/L (02-04-25 @ 00:29)  Alanine Aminotransferase (ALT/SGPT): 9 U/L (02-04-25 @ 00:29)  Alkaline Phosphatase: 118 U/L (02-04-25 @ 00:29)  Bilirubin Total: 0.4 mg/dL (02-03-25 @ 00:20)  Aspartate Aminotransferase (AST/SGOT): 16 U/L (02-03-25 @ 00:20)  Alanine Aminotransferase (ALT/SGPT): 10 U/L (02-03-25 @ 00:20)  Alkaline Phosphatase: 122 U/L (02-03-25 @ 00:20)  Bilirubin Total: 0.5 mg/dL (02-02-25 @ 00:27)  Aspartate Aminotransferase (AST/SGOT): 20 U/L (02-02-25 @ 00:27)  Alanine Aminotransferase (ALT/SGPT): 10 U/L (02-02-25 @ 00:27)  Alkaline Phosphatase: 125 U/L (02-02-25 @ 00:27)         Patient is a 54y old  Male who presents with a chief complaint of CAD s/p CABG (04 Feb 2025 07:14)    Being followed by ID for        Interval history:  back lesion much improved  remains afebrile   No other acute events          PAST MEDICAL & SURGICAL HISTORY:  Type 2 diabetes mellitus      Hypertension      End stage renal disease  started HD 2/2019 T, Th, Sat via right chest permacath      Bone spur  right shoulder- hx of - sx done      Anemia      Injury of right wrist  hx of at age 14      History of hyperkalemia  before HD- K-6.2 - to repeat in am of sx, pt had HD today      S/P arthroscopy of right shoulder  2010      History of vascular access device  right chest permacath - 2/2019      H/O right wrist surgery  tendons repair - at age 14      AV fistula      H/O ventral hernia repair      S/P cholecystectomy        Allergies    No Known Allergies    Intolerances      Antimicrobials:      MEDICATIONS  (STANDING):  acetylcysteine 10%  Inhalation 4 milliLiter(s) Inhalation every 6 hours  albuterol    0.083% 2.5 milliGRAM(s) Nebulizer every 6 hours  aMIOdarone    Tablet 200 milliGRAM(s) Oral daily  ascorbic acid 500 milliGRAM(s) Oral daily  aspirin  chewable 81 milliGRAM(s) Oral daily  atorvastatin 80 milliGRAM(s) Oral at bedtime  chlorhexidine 0.12% Liquid 15 milliLiter(s) Oral Mucosa every 12 hours  chlorhexidine 2% Cloths 1 Application(s) Topical daily  chlorhexidine 4% Liquid 1 Application(s) Topical <User Schedule>  dextrose 50% Injectable 50 milliLiter(s) IV Push every 15 minutes  epoetin ignacia (EPOGEN) Injectable 63953 Unit(s) IV Push <User Schedule>  heparin   Injectable 5000 Unit(s) SubCutaneous every 8 hours  insulin lispro (ADMELOG) corrective regimen sliding scale   SubCutaneous every 6 hours  melatonin 5 milliGRAM(s) Oral at bedtime  methocarbamol 500 milliGRAM(s) Oral every 8 hours  metoprolol tartrate 12.5 milliGRAM(s) Oral every 12 hours  mirtazapine 7.5 milliGRAM(s) Oral daily  Nephro-elicia 1 Tablet(s) Oral daily  pantoprazole  Injectable 40 milliGRAM(s) IV Push daily  polyethylene glycol 3350 17 Gram(s) Oral two times a day  sodium chloride 0.65% Nasal 1 Spray(s) Both Nostrils every 12 hours  sodium chloride 0.9%. 1000 milliLiter(s) (10 mL/Hr) IV Continuous <Continuous>  traZODone 50 milliGRAM(s) Oral at bedtime      Vital Signs Last 24 Hrs  T(C): 36.7 (02-06-25 @ 08:00), Max: 37.2 (02-05-25 @ 16:00)  T(F): 98.1 (02-06-25 @ 08:00), Max: 98.9 (02-05-25 @ 16:00)  HR: 87 (02-06-25 @ 08:00) (62 - 103)  BP: 159/89 (02-06-25 @ 08:00) (117/73 - 163/73)  BP(mean): 119 (02-06-25 @ 08:00) (85 - 119)  RR: 17 (02-06-25 @ 08:00) (14 - 29)  SpO2: 97% (02-06-25 @ 08:00) (93% - 100%)    Physical Exam:    Constitutional well preserved,comfortable,pleasant    HEENT PERRLA EOMI,No pallor or icterus    No oral exudate or erythema    Neck supple no JVD or LN    Chest Good AE,CTA    CVS  S1 S2     Abd soft BS normal No tenderness     Ext No cyanosis clubbing or edema    IV site no erythema tenderness or discharge    Joints no swelling or LOM    CNS AAO X 3 no focal    Lab Data:                          7.6    9.28  )-----------( 195      ( 06 Feb 2025 00:20 )             24.0       02-06    131[L]  |  93[L]  |  31[H]  ----------------------------<  135[H]  4.0   |  26  |  4.45[H]    Ca    8.7      06 Feb 2025 00:20  Phos  2.9     02-06  Mg     2.2     02-06    TPro  5.9[L]  /  Alb  2.7[L]  /  TBili  0.4  /  DBili  x   /  AST  16  /  ALT  8[L]  /  AlkPhos  113  02-06          Drainage  02-01-25   Growth in fluid media only Staphylococcus aureus  Growth in fluid media only Serratia marcescens  --  Methicillin resistant Staphylococcus aureus  Serratia marcescens      Bronchial  01-30-25   Commensal manjit consistent with body site  --    Few polymorphonuclear leukocytes seen per low power field  Few Squamous epithelial cells seen per low power field  Few Yeast like cells seen per oil power field        WBC Count: 9.28 (02-06-25 @ 00:20)  WBC Count: 11.12 (02-05-25 @ 00:19)  WBC Count: 11.39 (02-04-25 @ 00:31)  WBC Count: 10.02 (02-03-25 @ 00:20)  WBC Count: 9.68 (02-02-25 @ 00:21)  WBC Count: 11.02 (02-01-25 @ 00:42)  WBC Count: 9.40 (01-31-25 @ 00:30)       Bilirubin Total: 0.4 mg/dL (02-06-25 @ 00:20)  Aspartate Aminotransferase (AST/SGOT): 16 U/L (02-06-25 @ 00:20)  Alanine Aminotransferase (ALT/SGPT): 8 U/L (02-06-25 @ 00:20)  Alkaline Phosphatase: 113 U/L (02-06-25 @ 00:20)  Bilirubin Total: 0.4 mg/dL (02-05-25 @ 00:18)  Aspartate Aminotransferase (AST/SGOT): 17 U/L (02-05-25 @ 00:18)  Alanine Aminotransferase (ALT/SGPT): 7 U/L (02-05-25 @ 00:18)  Alkaline Phosphatase: 120 U/L (02-05-25 @ 00:18)  Bilirubin Total: 0.4 mg/dL (02-04-25 @ 12:35)  7)

## 2025-02-06 NOTE — PROGRESS NOTE ADULT - ASSESSMENT
54M with HTN, HLD, ESRD on HD MWF via LUE AVF, ascites (requiring repeat paracenteses q4-6wks), NICM with moderate LV dysfunction w/ EF 35-40%() and CAD s/p atherectomy + SALLIE to D1 (2024) presents to [facility] 2024 as transfer from [facility] (via [facility]) where he presented  with SOB.   In [facility] ED, pt was hypoxic to 86% on RA, trop 1.7K, EKG no new changes, CXR fluid overload. Admitted for AHRF 2/2 fluid overload. Pt received HD on , ,  and . DAPT was resumed, TTE showed improvement in LVEF to 40-45%. Stress test was abnormal with 1mm inferior STD, reversible ischemia in the inferior wall and fixed defects in the lateral, anterior and apical walls with post stress EF of 24%. Pt was then transferred to [facility] for cardiac cath which showed pLAD 70% ISR, mLCx 70%, D1 severe dz. Patient now transferred to [facility] 2024 for CABG.       - underwent  C3L free LIMA-LAD; SVG-Diag; SVG-leftPL   - extubated   - hypotension and ECHO showing Systolic HF/depressed BIV Function, currently supported with /pressors.  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 spoke to pharm D - can't use bactrim b/o hyperkalemia so will give vanco/levaquin for 3-5 days dosed for HD ( day # 2)   - surgical f/u appreciated   - yeast in bronch, not clear if true infection, monitor off antifungal for now  - acyclovir completed  - check cultures from bronch      Marla Champion M.D. ,   please reach via teams   If no answer, or after 5PM/ weekends,  then please call  [phone]    Assessment and plan discussed with the primary team .

## 2025-02-06 NOTE — PROCEDURE NOTE - NSBRONCHPROCDETAILS_GEN_A_CORE_FT
Bronch was performed through the trach. AUGUSTO, middle and lower lobes irrigated and cleared off secretions. Right U, M, lower lobes were cleaned as well.   moderate secretions in left lobe and scant in right side.   The patient was taken off the mechanical vent and allowed to have spontaneous breaths.   The bronchoscope was passed through the tracheostomy.   Main trachea has 40-50% collapse , Right   Bronch was performed through the trach. AUGUSTO, middle and lower lobes irrigated and cleared off secretions. Right U, M, lower lobes were cleaned as well.   moderate secretions in left lobe and scant in right side.   The patient was taken off the mechanical vent and allowed to have spontaneous breaths.   The bronchoscope was passed through the tracheostomy.   Main trachea has 40-50% collapse , Right  main and left main bronchus has ~upto 50% bronchomalacia. segmental bronchi looks patent.   Will d/w IP to see if patient needs any further intervention.   Will continue to mobilize secretions with nebs, mucocyst, HTS as needed.

## 2025-02-06 NOTE — PROGRESS NOTE ADULT - SUBJECTIVE AND OBJECTIVE BOX
MR#[id number]  PATIENT NAME:RAAD CANO    DATE OF SERVICE: 02-06-25 @0630  Patient was seen and examined by Solo Quick MD on    02-06-25 @ 0630  Interim events noted.Consultant notes ,Labs,Telemetry reviewed by me       HOSPITAL COURSE: HPI:  54M with PMH of HTN, HLD, ESRD on HD MWF via LUE AVF, ascites (requiring repeat paracenteses q4-6wks), NICM with moderate LV dysfunction w/ EF 35-40%(2023) and CAD s/p atherectomy + SALLIE to D1(4/11/2024) presents to [facility] transferred from [facility]. Patient initially presented to [facility] ED with shortness of breath. Of note he was recently admitted from 09/27-12/02 for acute cholecystitis s/p cholecystectomy. In [facility] ED, pt was hypoxic to 86% on RA, trop 1.7K, EKG no new changes, CXR fluid overload. Admitted for AHRF 2/2 fluid overload. Pt received HD on 12/18, 12/19, 12/20 and 12/22. DAPT was resumed, TTE showed improvement in LVEF to 40-45%. Stress test was abnormal with 1mm inferior STD, reversible ischemia in the inferior wall and fixed defects in the lateral, anterior and apical walls with post stress EF of 24%. Pt was then transferred to [facility] for cardiac cath which showed pLAD 70% ISR, mLCx 70%, D1 severe dz. Patient now transferred to [facility] CTS Dr. Rivers for CABG eval. Patient denies any current SOB or chest pain on admission. Otherwise denies headaches, abdominal pain, urinary or bowel changes, fevers, chills, N/V/D, sick contacts.  (24 Dec 2024 05:07)      INTERIM EVENTS:Patient seen at bedside ,interim events noted.      PMH -reviewed admission note, no change since admission  HEART FAILURE: Acute[ ]Chronic[ ] Systolic[ ] Diastolic[ ] Combined Systolic and Diastolic[ ]  CAD[ ] CABG[ ] PCI[ ]  DEVICES[ ] PPM[ ] ICD[ ] ILR[ ]  ATRIAL FIBRILLATION[ ] Paroxysmal[ ] Permanent[ ] CHADS2-[  ]  GHASSAN[ ] CKD1[ ] CKD2[ ] CKD3[ ] CKD4[ ] ESRD[ ]  COPD[ ] HTN[ ]   DM[ ] Type1[ ] Type 2[ ]   CVA[ ] Paresis[ ]    AMBULATION: Assisted[ ] Cane/walker[ ] Independent[ ]    MEDICATIONS  (STANDING):  acetylcysteine 10%  Inhalation 4 milliLiter(s) Inhalation every 6 hours  aMIOdarone    Tablet 200 milliGRAM(s) Oral daily  ascorbic acid 500 milliGRAM(s) Oral daily  aspirin  chewable 81 milliGRAM(s) Oral daily  atorvastatin 80 milliGRAM(s) Oral at bedtime  chlorhexidine 0.12% Liquid 15 milliLiter(s) Oral Mucosa every 12 hours  chlorhexidine 2% Cloths 1 Application(s) Topical daily  chlorhexidine 4% Liquid 1 Application(s) Topical <User Schedule>  dextrose 50% Injectable 50 milliLiter(s) IV Push every 15 minutes  epoetin ignacia (EPOGEN) Injectable 28951 Unit(s) IV Push <User Schedule>  heparin   Injectable 5000 Unit(s) SubCutaneous every 8 hours  insulin lispro (ADMELOG) corrective regimen sliding scale   SubCutaneous every 6 hours  levalbuterol Inhalation 0.63 milliGRAM(s) Inhalation every 8 hours  melatonin 5 milliGRAM(s) Oral at bedtime  methocarbamol 500 milliGRAM(s) Oral every 8 hours  metoprolol tartrate 12.5 milliGRAM(s) Oral every 12 hours  mirtazapine 7.5 milliGRAM(s) Oral daily  Nephro-elicia 1 Tablet(s) Oral daily  pantoprazole  Injectable 40 milliGRAM(s) IV Push daily  polyethylene glycol 3350 17 Gram(s) Oral two times a day  sodium chloride 0.65% Nasal 1 Spray(s) Both Nostrils every 12 hours  sodium chloride 0.9%. 1000 milliLiter(s) (10 mL/Hr) IV Continuous <Continuous>  traZODone 50 milliGRAM(s) Oral at bedtime  vancomycin  IVPB 1000 milliGRAM(s) IV Intermittent every 24 hours    MEDICATIONS  (PRN):  acetaminophen     Tablet .. 650 milliGRAM(s) Oral every 6 hours PRN Mild Pain (1 - 3)  lidocaine/prilocaine Cream 1 Application(s) Topical daily PRN pre-HD  oxyCODONE    IR 5 milliGRAM(s) Oral every 4 hours PRN Moderate Pain (4 - 6)  sodium chloride 0.9% lock flush 10 milliLiter(s) IV Push every 1 hour PRN Pre/post blood products, medications, blood draw, and to maintain line patency            REVIEW OF SYSTEMS:  Constitutional: [ ] fever, [ ]weight loss,  [ ]fatigue [ ]weight gain  Eyes: [ ] visual changes  Respiratory: [ ]shortness of breath;  [ ] cough, [ ]wheezing, [ ]chills, [ ]hemoptysis  Cardiovascular: [ ] chest pain, [ ]palpitations, [ ]dizziness,  [ ]leg swelling[ ]orthopnea[ ]PND  Gastrointestinal: [ ] abdominal pain, [ ]nausea, [ ]vomiting,  [ ]diarrhea [ ]Constipation [ ]Melena  Genitourinary: [ ] dysuria, [ ] hematuria [ ]Vigil  Neurologic: [ ] headaches [ ] tremors[ ]weakness [ ]Paralysis Right[ ] Left[ ]  Skin: [ ] itching, [ ]burning, [ ] rashes  Endocrine: [ ] heat or cold intolerance  Musculoskeletal: [ ] joint pain or swelling; [ ] muscle, back, or extremity pain  Psychiatric: [ ] depression, [ ]anxiety, [ ]mood swings, or [ ]difficulty sleeping  Hematologic: [ ] easy bruising, [ ] bleeding gums    [ ] All remaining systems negative except as per above.   [ ]Unable to obtain.  [x] No change in ROS since admission      Vital Signs Last 24 Hrs  T(C): 36.7 (06 Feb 2025 21:00), Max: 36.8 (06 Feb 2025 12:00)  T(F): 98 (06 Feb 2025 21:00), Max: 98.2 (06 Feb 2025 12:00)  HR: 62 (06 Feb 2025 22:01) (62 - 108)  BP: 130/60 (06 Feb 2025 21:00) (118/65 - 159/89)  BP(mean): 87 (06 Feb 2025 21:00) (86 - 119)  RR: 17 (06 Feb 2025 21:00) (15 - 37)  SpO2: 100% (06 Feb 2025 22:01) (92% - 100%)    Parameters below as of 06 Feb 2025 22:01  Patient On (Oxygen Delivery Method): ventilator      I&O's Summary    05 Feb 2025 07:01  -  06 Feb 2025 07:00  --------------------------------------------------------  IN: 1480 mL / OUT: 1000 mL / NET: 480 mL    06 Feb 2025 07:01  -  06 Feb 2025 22:21  --------------------------------------------------------  IN: 955 mL / OUT: 0 mL / NET: 955 mL        PHYSICAL EXAM:  General: No acute distress BMI-  HEENT: EOMI, PERRL  Neck: Supple, [ ] JVD  Lungs: Equal air entry bilaterally; [ ] rales [ ] wheezing [ ] rhonchi  Heart: Regular rate and rhythm; [x ] murmur   2/6 [ x] systolic [ ] diastolic [ ] radiation[ ] rubs [ ]  gallops  Abdomen: Nontender, bowel sounds present  Extremities: No clubbing, cyanosis, [ ] edema [ ]Pulses  equal and intact  Nervous system:  Alert & Oriented X3, no focal deficits  Psychiatric: Normal affect  Skin: No rashes or lesions    LABS:  02-06    131[L]  |  93[L]  |  31[H]  ----------------------------<  135[H]  4.0   |  26  |  4.45[H]    Ca    8.7      06 Feb 2025 00:20  Phos  2.9     02-06  Mg     2.2     02-06    TPro  5.9[L]  /  Alb  2.7[L]  /  TBili  0.4  /  DBili  x   /  AST  16  /  ALT  8[L]  /  AlkPhos  113  02-06    Creatinine Trend: 4.45<--, 5.10<--, 4.58<--, 4.12<--, 5.74<--, 5.38<--                        7.6    9.28  )-----------( 195      ( 06 Feb 2025 00:20 )             24.0

## 2025-02-06 NOTE — PROGRESS NOTE ADULT - ASSESSMENT
54M with PMH of HTN, HLD, ESRD on HD MWF via LUE AVF, ascites (requiring repeat paracenteses q4-6wks), NICM with moderate LV dysfunction w/ EF 35-40%(2023) and CAD s/p atherectomy + SALLIE to D1(4/11/2024) presents to [facility] transferred from [facility] for CABG eval  Nephro on [facility] for HD    ESRD on HD   Regular schedule MWF   Access LUE AVF  [facility]  Nephrologist Dr. Bailey  S/p HD on 01/29, 2/3 and 02/05  HD tomm  Keep MAP>65  Renal Diet  Consent in physical chart    Hyper/Hypokalemia  Monitor K, will change dialysate fluid as needed    HTN  bp fluctuating; acceptable  monitor bp     CKD MBD  Can send a PTH  Ca and Phos daily    Anemia  CRISTIANE with HD  Transfuse if hgb <7    CAD with multivessel disease  s/p CABG 12/30  CTICU following    Hypoxic Resp failure requiring Trach  Per surgery  S/p bronchoscopy, pulm following

## 2025-02-06 NOTE — PROGRESS NOTE ADULT - ASSESSMENT
54M with PMH of HTN, HLD, ESRD on HD MWF via LUE AVF, ascites (requiring repeat paracenteses q4-6wks), NICM with moderate LV dysfunction w/ EF 35-40%() and CAD s/p atherectomy + SALLIE to D1(2024) presents to [facility] transferred from [facility]. Patient initially presented to [facility] ED with shortness of breath. Of note he was recently admitted from - for acute cholecystitis s/p cholecystectomy. In [facility] ED, pt was hypoxic to 86% on RA, trop 1.7K, EKG no new changes, CXR fluid overload. Admitted for AHRF 2/2 fluid overload. Pt received HD on , ,  and . DAPT was resumed, TTE showed improvement in LVEF to 40-45%. Stress test was abnormal with 1mm inferior STD, reversible ischemia in the inferior wall and fixed defects in the lateral, anterior and apical walls with post stress EF of 24%.     Pt was then transferred to [facility] for cardiac cath which showed pLAD 70% ISR, mLCx 70%, D1 severe dz. Patient now transferred to [facility] CTS Dr. Rivers for CABG eval.# CAD s/p NSTEMI  Hx CAD s/p PCI LAD   Presented with ADHF elevated troponins  Positive NST1-Cath- pLAD 70% ISR, mLCx 70%, D1 severe dz.   TTE EF 35% Severe TRWas on Plavix-p2y12- 321 been off Plavix since -POD#1-C3L free LIMA-LAD; SVG-Diag; QEA-kbbxNY-Bwmijyoja on  Sinus rhythm,Amio for AF prophylaxis  -OOB off  Sinus rhythm   -POD#3-On /pressors-EF 25-30% RV failure with transaminitis-Sinus Qabcvh94/03-POD#4-Was intubated for hypoxia-gradual hypotension on /pressors had IABP inserted this morning  -POD#5-s/p IABP insertion, sepsis/septic shock due to GNR/ECOLI HD cath placed   Bronched yesterday 1/3 - minimal secretions with rust-tinged sputum   ESBL-E Coli bacteremia on meropenam    - POD#7 Atrial Fib ,remains intubated weaning IABP 1:3,off pressors on CRRT  -IABP removed.Remains on vent-Alex 10ppm,off Levo- CVP 10 / MAP 71 / Hct 25.6% / Lactate 1.7-Atrial Fibrillation  -Intubated on Alex 10ppm, gtt, BP better-AF~~90's on Amio-had bronch still febrile Tmax 90600/-Extubated awake alert on HFNC AF,on Dobutrex-CRRT,Cultures no growth    01/10-OOB on BiPAP Sinus Rhythm on -SR/ MAP 57/ CVP 10/  Hct 26.7 / Lact 1.4  -s/p EDU/DCCV-Sinus rhythm on -SR / MAP 52 / CVP 12/ Hct 25.8 / Lact 0.7-had bronch with BAL Left lung  -Sinus rhythm on -for repeat Bronch-SR / MAP 67 / CVP 11 / Hct 27.4% / Lact 0.8  -s/p Trach on  TTE EF 55%-SR / CVP 2 / MAP 63 / Hct 25.9% / Lactate 0.9  -Awake on Trach collar off  c/o buttock pain had bronch yesterday-BAL-Sinus rhythm  -Sinus rhythm on vent at night-BP stable may need to increase hydrallazine 25 mg q8  -TTE EF 60% still needs Vent support at night Bradycardic trial of Bryce now back on   -Awake alert Sinus rhythm BP elevated  remains on ,Nocturnal vent support  resume Hydralazine 25mg q8  -Reverted to AF rvr  -s/p BRonc/BAL debridement sacral decub  -Awake noted AF RVR no further bradyarrhythmias-Start BBlockers      # Acute on Chronic Systolic Heart Failure now improved  EF-35% ,severe TR  Had HD post op  GDMT post op  LVEF improved post bypass but now at 23-30%-BiV dysfunction on  now off pressors  -TTE EF 40%,trace effusion  ECG c/w pericarditis-was on colchicine   01/15-TTE EF 55%  -TTE EF 60%   -Normal LV systolic function Moderate pericardial effusion    Vascular evaluated  AVGraft /?steal causing RV failure-'' Very low suspicion of steal Sd and AVF causing current clinical state. ''   VA Duplex Hemodialysis Access, Left (25 @ 13:35) >   Arterial flow volume of 1301 mL/m, and the venous flow volume of  1492 mL/m are consistent with a normally functioning dialysis access  fistula.      # Ascites  Hx chronic ascites  Has drainage q4-6 weeks  Last drained -4600mL  ? ascites related to severe TR  Had lxddrxzyfekj-Pslvdva-gw growth   CT Abdomen 01/10-Large volume ascites-  US abdomen--Small to moderate volume abdominal/pelvic ascites      # ESRD  Now off CRRT transition to HD

## 2025-02-07 LAB
ALBUMIN SERPL ELPH-MCNC: 2.6 G/DL — LOW (ref 3.3–5)
ALP SERPL-CCNC: 100 U/L — SIGNIFICANT CHANGE UP (ref 40–120)
ALT FLD-CCNC: 11 U/L — SIGNIFICANT CHANGE UP (ref 10–45)
ANION GAP SERPL CALC-SCNC: 12 MMOL/L — SIGNIFICANT CHANGE UP (ref 5–17)
AST SERPL-CCNC: 15 U/L — SIGNIFICANT CHANGE UP (ref 10–40)
BASOPHILS # BLD AUTO: 0.03 K/UL — SIGNIFICANT CHANGE UP (ref 0–0.2)
BASOPHILS NFR BLD AUTO: 0.3 % — SIGNIFICANT CHANGE UP (ref 0–2)
BILIRUB SERPL-MCNC: 0.4 MG/DL — SIGNIFICANT CHANGE UP (ref 0.2–1.2)
BLD GP AB SCN SERPL QL: NEGATIVE — SIGNIFICANT CHANGE UP
BUN SERPL-MCNC: 35 MG/DL — HIGH (ref 7–23)
CHLORIDE SERPL-SCNC: 94 MMOL/L — LOW (ref 96–108)
CO2 SERPL-SCNC: 24 MMOL/L — SIGNIFICANT CHANGE UP (ref 22–31)
CREAT SERPL-MCNC: 4.37 MG/DL — HIGH (ref 0.5–1.3)
EGFR: 15 ML/MIN/1.73M2 — LOW
EOSINOPHIL # BLD AUTO: 0.57 K/UL — HIGH (ref 0–0.5)
EOSINOPHIL NFR BLD AUTO: 6 % — SIGNIFICANT CHANGE UP (ref 0–6)
GLUCOSE SERPL-MCNC: 123 MG/DL — HIGH (ref 70–99)
HCT VFR BLD CALC: 24.3 % — LOW (ref 39–50)
HGB BLD-MCNC: 7.5 G/DL — LOW (ref 13–17)
IMM GRANULOCYTES NFR BLD AUTO: 0.4 % — SIGNIFICANT CHANGE UP (ref 0–0.9)
LYMPHOCYTES # BLD AUTO: 0.45 K/UL — LOW (ref 1–3.3)
LYMPHOCYTES # BLD AUTO: 4.7 % — LOW (ref 13–44)
MAGNESIUM SERPL-MCNC: 2.3 MG/DL — SIGNIFICANT CHANGE UP (ref 1.6–2.6)
MCHC RBC-ENTMCNC: 29.6 PG — SIGNIFICANT CHANGE UP (ref 27–34)
MCHC RBC-ENTMCNC: 30.9 G/DL — LOW (ref 32–36)
MCV RBC AUTO: 96 FL — SIGNIFICANT CHANGE UP (ref 80–100)
MONOCYTES # BLD AUTO: 1.4 K/UL — HIGH (ref 0–0.9)
NEUTROPHILS # BLD AUTO: 6.99 K/UL — SIGNIFICANT CHANGE UP (ref 1.8–7.4)
NEUTROPHILS NFR BLD AUTO: 73.8 % — SIGNIFICANT CHANGE UP (ref 43–77)
NRBC # BLD: 0 /100 WBCS — SIGNIFICANT CHANGE UP (ref 0–0)
NRBC BLD-RTO: 0 /100 WBCS — SIGNIFICANT CHANGE UP (ref 0–0)
PHOSPHATE SERPL-MCNC: 2.8 MG/DL — SIGNIFICANT CHANGE UP (ref 2.5–4.5)
PLATELET # BLD AUTO: 194 K/UL — SIGNIFICANT CHANGE UP (ref 150–400)
POTASSIUM SERPL-MCNC: 4.3 MMOL/L — SIGNIFICANT CHANGE UP (ref 3.5–5.3)
POTASSIUM SERPL-SCNC: 4.3 MMOL/L — SIGNIFICANT CHANGE UP (ref 3.5–5.3)
PROT SERPL-MCNC: 5.9 G/DL — LOW (ref 6–8.3)
RBC # BLD: 2.53 M/UL — LOW (ref 4.2–5.8)
RBC # FLD: 22.3 % — HIGH (ref 10.3–14.5)
RH IG SCN BLD-IMP: POSITIVE — SIGNIFICANT CHANGE UP
SODIUM SERPL-SCNC: 130 MMOL/L — LOW (ref 135–145)
VANCOMYCIN TROUGH SERPL-MCNC: 14.6 UG/ML — SIGNIFICANT CHANGE UP (ref 10–20)
WBC # BLD: 9.48 K/UL — SIGNIFICANT CHANGE UP (ref 3.8–10.5)
WBC # FLD AUTO: 9.48 K/UL — SIGNIFICANT CHANGE UP (ref 3.8–10.5)

## 2025-02-07 PROCEDURE — 99291 CRITICAL CARE FIRST HOUR: CPT

## 2025-02-07 PROCEDURE — 76604 US EXAM CHEST: CPT | Mod: 26

## 2025-02-07 PROCEDURE — 71045 X-RAY EXAM CHEST 1 VIEW: CPT | Mod: 26

## 2025-02-07 PROCEDURE — 99233 SBSQ HOSP IP/OBS HIGH 50: CPT

## 2025-02-07 PROCEDURE — 86077 PHYS BLOOD BANK SERV XMATCH: CPT

## 2025-02-07 PROCEDURE — G0545: CPT

## 2025-02-07 RX ORDER — OXYCODONE HYDROCHLORIDE 30 MG/1
10 TABLET ORAL ONCE
Refills: 0 | Status: DISCONTINUED | OUTPATIENT
Start: 2025-02-07 | End: 2025-02-07

## 2025-02-07 RX ADMIN — OXYCODONE HYDROCHLORIDE 10 MILLIGRAM(S): 30 TABLET ORAL at 20:45

## 2025-02-07 RX ADMIN — Medication 1 APPLICATION(S): at 21:00

## 2025-02-07 RX ADMIN — AMIODARONE HYDROCHLORIDE 200 MILLIGRAM(S): 50 INJECTION, SOLUTION INTRAVENOUS at 06:35

## 2025-02-07 RX ADMIN — Medication 40 MILLIGRAM(S): at 12:19

## 2025-02-07 RX ADMIN — ACETYLCYSTEINE 4 MILLILITER(S): 200 INHALANT RESPIRATORY (INHALATION) at 11:56

## 2025-02-07 RX ADMIN — EPOETIN ALFA 10000 UNIT(S): 10000 SOLUTION INTRAVENOUS; SUBCUTANEOUS at 13:10

## 2025-02-07 RX ADMIN — Medication 15 MILLILITER(S): at 17:29

## 2025-02-07 RX ADMIN — Medication 50 MILLIGRAM(S): at 22:00

## 2025-02-07 RX ADMIN — Medication 500 MILLIGRAM(S): at 12:19

## 2025-02-07 RX ADMIN — Medication 1 TABLET(S): at 12:19

## 2025-02-07 RX ADMIN — Medication 5 MILLIGRAM(S): at 22:00

## 2025-02-07 RX ADMIN — LEVALBUTEROL HYDROCHLORIDE 0.63 MILLIGRAM(S): 1.25 SOLUTION RESPIRATORY (INHALATION) at 21:40

## 2025-02-07 RX ADMIN — INSULIN LISPRO 2: 100 INJECTION, SOLUTION INTRAVENOUS; SUBCUTANEOUS at 18:50

## 2025-02-07 RX ADMIN — METOPROLOL SUCCINATE 12.5 MILLIGRAM(S): 50 TABLET, EXTENDED RELEASE ORAL at 17:29

## 2025-02-07 RX ADMIN — OXYCODONE HYDROCHLORIDE 10 MILLIGRAM(S): 30 TABLET ORAL at 21:15

## 2025-02-07 RX ADMIN — ACETYLCYSTEINE 4 MILLILITER(S): 200 INHALANT RESPIRATORY (INHALATION) at 05:25

## 2025-02-07 RX ADMIN — LEVALBUTEROL HYDROCHLORIDE 0.63 MILLIGRAM(S): 1.25 SOLUTION RESPIRATORY (INHALATION) at 05:25

## 2025-02-07 RX ADMIN — METHOCARBAMOL 500 MILLIGRAM(S): 500 TABLET, FILM COATED ORAL at 15:03

## 2025-02-07 RX ADMIN — METHOCARBAMOL 500 MILLIGRAM(S): 500 TABLET, FILM COATED ORAL at 06:35

## 2025-02-07 RX ADMIN — HEPARIN SODIUM 5000 UNIT(S): 1000 INJECTION INTRAVENOUS; SUBCUTANEOUS at 22:00

## 2025-02-07 RX ADMIN — HEPARIN SODIUM 5000 UNIT(S): 1000 INJECTION INTRAVENOUS; SUBCUTANEOUS at 15:02

## 2025-02-07 RX ADMIN — Medication 15 MILLILITER(S): at 06:35

## 2025-02-07 RX ADMIN — ACETYLCYSTEINE 4 MILLILITER(S): 200 INHALANT RESPIRATORY (INHALATION) at 21:41

## 2025-02-07 RX ADMIN — Medication 81 MILLIGRAM(S): at 12:19

## 2025-02-07 RX ADMIN — ATORVASTATIN CALCIUM 80 MILLIGRAM(S): 80 TABLET, FILM COATED ORAL at 21:00

## 2025-02-07 RX ADMIN — LEVALBUTEROL HYDROCHLORIDE 0.63 MILLIGRAM(S): 1.25 SOLUTION RESPIRATORY (INHALATION) at 11:57

## 2025-02-07 RX ADMIN — METHOCARBAMOL 500 MILLIGRAM(S): 500 TABLET, FILM COATED ORAL at 22:00

## 2025-02-07 RX ADMIN — Medication 250 MILLIGRAM(S): at 19:00

## 2025-02-07 RX ADMIN — MIRTAZAPINE 7.5 MILLIGRAM(S): 30 TABLET, FILM COATED ORAL at 12:19

## 2025-02-07 RX ADMIN — Medication 1 APPLICATION(S): at 06:39

## 2025-02-07 RX ADMIN — Medication 10 MILLILITER(S): at 19:00

## 2025-02-07 RX ADMIN — HEPARIN SODIUM 5000 UNIT(S): 1000 INJECTION INTRAVENOUS; SUBCUTANEOUS at 06:35

## 2025-02-07 NOTE — PROGRESS NOTE ADULT - SUBJECTIVE AND OBJECTIVE BOX
Patient seen and examined at the bedside.    Remains critically ill on continuous ICU monitoring, at risk for life threatening decompensation.  All Labs, data reviewed. Plan of care discussed in length during multi-disciplinary ICU rounds.       Brief Summary:  55 yo M with multiple medical problems including CAD s/p PCI in 2024 s/p CABG x 3 on 24  1: Intubated for hypoxia & left lung white out s/p awake bronch with removal of copious mucopurulent secretions  : s/p IABP insertion, sepsis/septic shock due to E coli   ESBL pneumonia, collapsed Left Lung, s/p multiple bronch    tracheostomy tube placement    VRE E. Faecium Bcx   +HSV PCR BAL   tissue cx from back abscess - Serratia   - - intermittent bradycardia/junctional episodes with hypotension  2/3: off , off theophylline. iHD 1L UF  : bronch for Left lung collapse wound vac, 2decho w/ moderate pericardial effusion - similar as before, US abd: small - moderate ascites - monitor at this time.  Wound vac placed for sacral wounds  : iHD-1L UF  : bronch today off positive pressure. : MRSA and serratia from cyst on the back. continue levaquin + Vanc x 5 days    24 Hour events:  Plan for iHD today  PT/OT, OOBC as tolerated  : MRSA and serratia from cyst on the back. continue levaquin + Vanc x 5 days, vanc per level  Bronch yesterday    Objective:  Vital Signs Last 24 Hrs  T(C): 37.2 (2025 04:00), Max: 37.2 (2025 04:00)  T(F): 99 (2025 04:00), Max: 99 (2025 04:00)  HR: 62 (2025 06:00) (60 - 108)  BP: 123/67 (2025 06:00) (118/65 - 159/89)  BP(mean): 90 (2025 06:00) (85 - 119)  RR: 16 (2025 06:00) (15 - 37)  SpO2: 99% (2025 06:00) (92% - 100%)    Parameters below as of 2025 05:30  Patient On (Oxygen Delivery Method): ventilator    Mode: AC/ CMV (Assist Control/ Continuous Mandatory Ventilation)  RR (machine): 16  FiO2: 40  PEEP: 10  ITime: 1  MAP: 17  PC: 15  PIP: 26    Physical Exam:  General: Alert and interactive,   Neurology: Oriented, following commands. ambulates  Respiratory: Breath sounds bilaterally, trach collar  CV: A flutter  Abdominal: Soft, Nontender  Extremities: Warm, well-perfused  Back: open abscess/wound to right paralumbar area, indurated, no fluctuance, no crepitus; sacral wound   Right arm tender, but non-infectious appearing  -------------------------------------------------------------------------------------------------------------------------------    Labs:                        7.5    9.48  )-----------( 194      ( 2025 00:35 )             24.3     02-07    130[L]  |  94[L]  |  35[H]  ----------------------------<  123[H]  4.3   |  24  |  4.37[H]    Ca    8.9      2025 00:34  Phos  2.8     02-07  Mg     2.3     02-07    TPro  5.9[L]  /  Alb  2.6[L]  /  TBili  0.4  /  DBili  x   /  AST  15  /  ALT  11  /  AlkPhos  100  02-07    LIVER FUNCTIONS - ( 2025 00:34 )  Alb: 2.6 g/dL / Pro: 5.9 g/dL / ALK PHOS: 100 U/L / ALT: 11 U/L / AST: 15 U/L / GGT: x           ALL MEDICATIONS   MEDICATIONS  (STANDING):  acetylcysteine 10%  Inhalation 4 milliLiter(s) Inhalation every 6 hours  aMIOdarone    Tablet 200 milliGRAM(s) Oral daily  ascorbic acid 500 milliGRAM(s) Oral daily  aspirin  chewable 81 milliGRAM(s) Oral daily  atorvastatin 80 milliGRAM(s) Oral at bedtime  chlorhexidine 0.12% Liquid 15 milliLiter(s) Oral Mucosa every 12 hours  chlorhexidine 2% Cloths 1 Application(s) Topical daily  chlorhexidine 4% Liquid 1 Application(s) Topical <User Schedule>  dextrose 50% Injectable 50 milliLiter(s) IV Push every 15 minutes  epoetin ignacia (EPOGEN) Injectable 34706 Unit(s) IV Push <User Schedule>  heparin   Injectable 5000 Unit(s) SubCutaneous every 8 hours  insulin lispro (ADMELOG) corrective regimen sliding scale   SubCutaneous every 6 hours  levalbuterol Inhalation 0.63 milliGRAM(s) Inhalation every 8 hours  levoFLOXacin IVPB 500 milliGRAM(s) IV Intermittent every 48 hours  melatonin 5 milliGRAM(s) Oral at bedtime  methocarbamol 500 milliGRAM(s) Oral every 8 hours  metoprolol tartrate 12.5 milliGRAM(s) Oral every 12 hours  mirtazapine 7.5 milliGRAM(s) Oral daily  Nephro-elicia 1 Tablet(s) Oral daily  pantoprazole  Injectable 40 milliGRAM(s) IV Push daily  polyethylene glycol 3350 17 Gram(s) Oral two times a day  sodium chloride 0.65% Nasal 1 Spray(s) Both Nostrils every 12 hours  sodium chloride 0.9%. 1000 milliLiter(s) (10 mL/Hr) IV Continuous <Continuous>  traZODone 50 milliGRAM(s) Oral at bedtime  vancomycin  IVPB 1000 milliGRAM(s) IV Intermittent every 24 hours    MEDICATIONS  (PRN):  acetaminophen     Tablet .. 650 milliGRAM(s) Oral every 6 hours PRN Mild Pain (1 - 3)  lidocaine/prilocaine Cream 1 Application(s) Topical daily PRN pre-HD  oxyCODONE    IR 5 milliGRAM(s) Oral every 4 hours PRN Moderate Pain (4 - 6)  sodium chloride 0.9% lock flush 10 milliLiter(s) IV Push every 1 hour PRN Pre/post blood products, medications, blood draw, and to maintain line patency  ------------------------------------------------------------------------------------------------------------------------------  Assessment:  55 yo M s/p CABG x 3 on 24    Postop acute respiratory failure  Acute blood loss anemia  Ascites    ESRD on iHD   E coli bacteremia  pneumonia  VRE E. Faecium   HSV infection  Tracheomalacia     Plan:  ***Neuro***  Postoperative acute pain control with Robaxin, Tylenol, and Oxycodone prn  Trazodone and Melatonin nightly  PT ongoing    ***Cardiovascular***  s/p CABG x 3  A fib s/p cardioversion  Cannot be on AC due to moderate pericardial effusion  Tolerating betablocker , PO Amio for Afib  TTE  LVEF 60%, mod RA pericardial effusion   ASA / Statin daily    ***Pulmonary***  Postoperative acute respiratory failure  Tracheostomy    Requiring frequent bronchs for left lobe collpase  Mucomyst, duo nebs, HTS to help mobilize secretions  Left lung PNA, + e coli ESBL - cleared but now with VRE   Tolerates trach collar with high flow, keep on a vent at night  Bronchoscopy by IP on  - no concerns for bronchomalacia  Repeat bronch w/ IP on  - to assess bronchiectasia vs malacia    ***GI***  On Tube feeds at goal  Protonix for stress ulcer prophylaxis.   Ascites: Last paracentesis , monitor repeat US w/ mild-moderate     ***Renal***  ESRD - tolerating iHD ([facility])   Nephro-Elicia for renal support  iHD  ([facility])    ***ID***  Monitor cultures   BAL - commensal manjit    VRE BAL - Linezolid    Acyclovir for + HSV in BAL   serratia on tissue cx and h/o ESBL PNA - Meropenem until : MRSA and serratia from cyst on the back. continue levaquin + Vanc x 5 days    ***Endocrine***  Hyperglycemia - Insulin sliding scale    ***Hematology***  Acute blood loss anemia  CBC, coags  Continue Epogen.  Heparin SQ for DVT  No AC s/p cardioversion. not on AC due to moderate pericardial effusion    Wound:  s/p sacral debridement on  . wound vac placed on         IStella MD, personally performed the services described in this documentation. All medical record entries made by the scribe were at my direction and in my presence. I have reviewed the chart and agree that the record reflects my personal performance and is accurate and complete  Electronically signed:   Stella Hodges MD  CT ICU attending     ICU time: ** mins     By signing my name below, I, Baldemar Hernandez, attest that this documentation has been prepared under the direction and in the presence of Stella Hodges MD  Electronically signed: Baldemar Hernandez, 25 @ 07:01             Patient seen and examined at the bedside.    Remains critically ill on continuous ICU monitoring, at risk for life threatening decompensation.  All Labs, data reviewed. Plan of care discussed in length during multi-disciplinary ICU rounds.       Brief Summary:  53 yo M with multiple medical problems including CAD s/p PCI in 2024 s/p CABG x 3 on 24  1: Intubated for hypoxia & left lung white out s/p awake bronch with removal of copious mucopurulent secretions  : s/p IABP insertion, sepsis/septic shock due to E coli   ESBL pneumonia, collapsed Left Lung, s/p multiple bronch    tracheostomy tube placement    VRE E. Faecium Bcx   +HSV PCR BAL   tissue cx from back abscess - Serratia   - - intermittent bradycardia/junctional episodes with hypotension  2/3: off , off theophylline. iHD 1L UF  : bronch for Left lung collapse wound vac, 2decho w/ moderate pericardial effusion - similar as before, US abd: small - moderate ascites - monitor at this time.  Wound vac placed for sacral wounds  : iHD-1L UF  : bronch today off positive pressure. : MRSA and serratia from cyst on the back. continue levaquin + Vanc x 5 days    24 Hour events:  Plan for iHD today  PT/OT, OOBC as tolerated  Bronch yesterday and clearance  continue ambulation    Objective:  Vital Signs Last 24 Hrs  T(C): 37.2 (2025 04:00), Max: 37.2 (2025 04:00)  T(F): 99 (2025 04:00), Max: 99 (2025 04:00)  HR: 62 (2025 06:00) (60 - 108)  BP: 123/67 (2025 06:00) (118/65 - 159/89)  BP(mean): 90 (2025 06:00) (85 - 119)  RR: 16 (2025 06:00) (15 - 37)  SpO2: 99% (2025 06:00) (92% - 100%)    Parameters below as of 2025 05:30  Patient On (Oxygen Delivery Method): ventilator    Mode: AC/ CMV (Assist Control/ Continuous Mandatory Ventilation)  RR (machine): 16  FiO2: 40  PEEP: 10  ITime: 1  MAP: 17  PC: 15  PIP: 26    Physical Exam:  General: Alert and interactive,   Neurology: Oriented, following commands. ambulates  Respiratory: Breath sounds bilaterally, trach collar  CV: A flutter  Abdominal: Soft, Nontender  Extremities: Warm, well-perfused  -------------------------------------------------------------------------------------------------------------------------------    Labs:                        7.5    9.48  )-----------( 194      ( 2025 00:35 )             24.3     02-07    130[L]  |  94[L]  |  35[H]  ----------------------------<  123[H]  4.3   |  24  |  4.37[H]    Ca    8.9      2025 00:34  Phos  2.8     02-07  Mg     2.3     02-07    TPro  5.9[L]  /  Alb  2.6[L]  /  TBili  0.4  /  DBili  x   /  AST  15  /  ALT  11  /  AlkPhos  100  02-07    LIVER FUNCTIONS - ( 2025 00:34 )  Alb: 2.6 g/dL / Pro: 5.9 g/dL / ALK PHOS: 100 U/L / ALT: 11 U/L / AST: 15 U/L / GGT: x           ALL MEDICATIONS   MEDICATIONS  (STANDING):  acetylcysteine 10%  Inhalation 4 milliLiter(s) Inhalation every 6 hours  aMIOdarone    Tablet 200 milliGRAM(s) Oral daily  ascorbic acid 500 milliGRAM(s) Oral daily  aspirin  chewable 81 milliGRAM(s) Oral daily  atorvastatin 80 milliGRAM(s) Oral at bedtime  chlorhexidine 0.12% Liquid 15 milliLiter(s) Oral Mucosa every 12 hours  chlorhexidine 2% Cloths 1 Application(s) Topical daily  chlorhexidine 4% Liquid 1 Application(s) Topical <User Schedule>  dextrose 50% Injectable 50 milliLiter(s) IV Push every 15 minutes  epoetin ignacia (EPOGEN) Injectable 72068 Unit(s) IV Push <User Schedule>  heparin   Injectable 5000 Unit(s) SubCutaneous every 8 hours  insulin lispro (ADMELOG) corrective regimen sliding scale   SubCutaneous every 6 hours  levalbuterol Inhalation 0.63 milliGRAM(s) Inhalation every 8 hours  levoFLOXacin IVPB 500 milliGRAM(s) IV Intermittent every 48 hours  melatonin 5 milliGRAM(s) Oral at bedtime  methocarbamol 500 milliGRAM(s) Oral every 8 hours  metoprolol tartrate 12.5 milliGRAM(s) Oral every 12 hours  mirtazapine 7.5 milliGRAM(s) Oral daily  Nephro-elicia 1 Tablet(s) Oral daily  pantoprazole  Injectable 40 milliGRAM(s) IV Push daily  polyethylene glycol 3350 17 Gram(s) Oral two times a day  sodium chloride 0.65% Nasal 1 Spray(s) Both Nostrils every 12 hours  sodium chloride 0.9%. 1000 milliLiter(s) (10 mL/Hr) IV Continuous <Continuous>  traZODone 50 milliGRAM(s) Oral at bedtime  vancomycin  IVPB 1000 milliGRAM(s) IV Intermittent every 24 hours    MEDICATIONS  (PRN):  acetaminophen     Tablet .. 650 milliGRAM(s) Oral every 6 hours PRN Mild Pain (1 - 3)  lidocaine/prilocaine Cream 1 Application(s) Topical daily PRN pre-HD  oxyCODONE    IR 5 milliGRAM(s) Oral every 4 hours PRN Moderate Pain (4 - 6)  sodium chloride 0.9% lock flush 10 milliLiter(s) IV Push every 1 hour PRN Pre/post blood products, medications, blood draw, and to maintain line patency  ------------------------------------------------------------------------------------------------------------------------------  Assessment:  53 yo M s/p CABG x 3 on 24    Postop acute respiratory failure  Acute blood loss anemia  Ascites    ESRD on iHD   E coli bacteremia  pneumonia  VRE E. Faecium   HSV infection  Tracheomalacia     Plan:  ***Neuro***  Postoperative acute pain control with Robaxin, Tylenol, and Oxycodone prn  Trazodone and Melatonin nightly  PT ongoing    ***Cardiovascular***  s/p CABG x 3  A fib s/p cardioversion  Cannot be on AC due to moderate pericardial effusion  Tolerating betablocker , PO Amio for Afib  TTE  LVEF 60%, mod RA pericardial effusion   ASA / Statin daily    ***Pulmonary***  Postoperative acute respiratory failure  Tracheostomy    Requiring frequent bronchs for left lobe collpase  Mucomyst, duo nebs, HTS to help mobilize secretions  Left lung PNA, + e coli ESBL - cleared but now with VRE   Tolerates trach collar with high flow, keep on a vent at night  Bronchoscopy by IP on  - no concerns for bronchomalacia  Repeat bronch w/ IP on  - to assess bronchiectasia vs malacia    ***GI***  On Tube feeds at goal  Protonix for stress ulcer prophylaxis.   Ascites: Last paracentesis , monitor repeat US w/ mild-moderate     ***Renal***  ESRD - tolerating iHD ([facility])   Nephro-Elicia for renal support  iHD  ([facility])    ***ID***  Monitor cultures   BAL - commensal manjit    VRE BAL - Linezolid    Acyclovir for + HSV in BAL   serratia on tissue cx and h/o ESBL PNA - Meropenem until : MRSA and serratia from cyst on the back. continue levaquin + Vanc x 5 days    ***Endocrine***  Hyperglycemia - Insulin sliding scale    ***Hematology***  Acute blood loss anemia  CBC, coags  Continue Epogen.  Heparin SQ for DVT  No AC s/p cardioversion. not on AC due to moderate pericardial effusion    Wound:  s/p sacral debridement on  . wound vac placed on  and changed three times a week        I, Stella Hodges MD, personally performed the services described in this documentation. All medical record entries made by the scribe were at my direction and in my presence. I have reviewed the chart and agree that the record reflects my personal performance and is accurate and complete  Electronically signed:   Stella Hodges MD  CT ICU attending     ICU time: 55 mins     By signing my name below, I, Baldemar Hernandez, attest that this documentation has been prepared under the direction and in the presence of Stella Hodges MD  Electronically signed: Baldemar Hernandez, 25 @ 07:01

## 2025-02-07 NOTE — PROGRESS NOTE ADULT - ASSESSMENT
54M with PMH of HTN, HLD, ESRD on HD MWF via LUE AVF, ascites (requiring repeat paracenteses q4-6wks), NICM with moderate LV dysfunction w/ EF 35-40%(2023) and CAD s/p atherectomy + SALLIE to D1(4/11/2024) presents to [facility] transferred from [facility] for CABG eval  Nephro on [facility] for HD    ESRD on HD   Regular schedule MWF   Access LUE AVF  [facility]  Nephrologist Dr. Bailey  S/p HD on 01/29, 2/3 and 02/05  HD today 2 L UF goal  Keep MAP>65  Renal Diet  Consent in physical chart    Hyper/Hypokalemia  Monitor K, will change dialysate fluid as needed    HTN  bp fluctuating; acceptable  monitor bp     CKD MBD  Can send a PTH  Ca and Phos daily    Anemia  CRISTIANE with HD  Transfuse if hgb <7    CAD with multivessel disease  s/p CABG 12/30  CTICU following    Hypoxic Resp failure requiring Trach  Per surgery  S/p bronchoscopy, pulm following

## 2025-02-07 NOTE — CONSULT NOTE ADULT - ASSESSMENT
INCOMPLETE    ASSESSMENT:    IMPRESSION:    RECS:    Diag:    Meds:    Misc and Manage: ASSESSMENT: Neurology consulted for LLE foot droop and paresthesia in 54M with CAD s/p PCI in April 2024, s/p CABG x3 on 12/30/24, prolonged hospital stay. LLE weakness and paresthesia beginning 2 days ago; exam significant for tenderness upon squeezing the left calf w palpable cord, 1+ dorsiflexion of the L ankle, 2+ L toe extension, and paresthesias in distal LLE.     IMPRESSION:  LLE weakness and paresthesia from knee to toes. Presentation appears to peripheral in etiology given the involvement of multiple nerves (muscular and cutaneous aspects of deep peroneal, cutaneous branches of superficial peroneal, cutaneous branches of posterior tibial nerve). Differentials include compression pathology vs deconditioning. Lower concern for central structural abnormality given exam. Additionally, lower concern for inflammatory pathology or demyelinating pathology given asymmetric presentation.     RECS:    Diag:  [] Left lower extremity venous duplex US to evaluate for venous stasis w compression  ***if ultrasonography negative for dvt, can pursue MRI brain to ro stroke, MRI lumbar spine to ro compression, and MRI lumbar plexus L wwo contrast to ro plexopathy    misc and manage:  [] PM&R consult for left ankle brace  [] continue working w PT and OT    Meds:  [] tylenol for pain    *note and signed by neurology resident overseeing student doctor  *case and recs not final until attending attestation

## 2025-02-07 NOTE — CONSULT NOTE ADULT - SUBJECTIVE AND OBJECTIVE BOX
Neurology - Consult Note    -  Spectra: [phone] ([facility]), [phone] ([facility])  -  Information from CTU notes and admission HP  54M with PMH HTN, HLD, ESRD on HD MWF via LUE AVF, ascites (requiring repeat paracenteses q4-6wks), NICM with moderate LV dysfunction and CAD NSTEMI 4/7/2024 s/p atherectomy, IVL, SALLIE, intervention of LAD branch D1 (PTCA atherectomy, atherectomy and SALLIE on 4/11/2024), recently admitted from 09/27-12/02 for acute cholecystitis s/p cholecystectomy presented to OSH ED 12/18/2024 with shortness of breath for 1day. Patient noted weight gain. In Ed was hypoxic, hypertroponemia, and CXR w fluid overload. Patient admitted for suspected NSTEMI/AHRF management. Stress test was abnormal with 1mm inferior STD, reversible ischemia in the inferior wall and fixed defects in the lateral, anterior and apical walls with post stress EF of 24%. Pt was then transferred to [facility] for cardiac cath which showed pLAD 70% ISR, mLCx 70%, D1 severe dz. Patient transferred to [facility] CTS Dr. Rivers for CABG eval.   Hospital stay:  12/23 Cardiac cath  pLAD 70% ISR, mLCx 70%, D1 severe dz.   12/31-POD#1-C3L free LIMA-LAD; SVG-Diag; SVG-leftPL Awake OOB extubated Sinus rhythm on Dobutamine gtt  01/2025-02/2025: hospital stay complicated by multiple extubation failures, ascites necessitating taps, and continued respiratory failure necessitating multiple bronchoscopies  02/04-Awake alert on T collar-TC during the day and PC: 12/10//15//50% at night - intermittent rapid AF noted  02/07-Awake had Bronch 02/06. Patient reported L foot drop to infectious disease onset four days prior. Neurology consulted for foot drop. No head or leg imaging conducted yet.      Review of Systems:  INCOMPLETE   CONSTITUTIONAL: No fevers or chills  EYES AND ENT: No visual changes or no throat pain   NECK: No pain or stiffness  RESPIRATORY: No hemoptysis or shortness of breath  CARDIOVASCULAR: No chest pain or palpitations  GASTROINTESTINAL: No melena or hematochezia  GENITOURINARY: No dysuria or hematuria  NEUROLOGICAL: +As stated in HPI above  SKIN: No itching, burning, rashes, or lesions   All other review of systems is negative unless indicated above.    Allergies:  No Known Allergies      PMHx/PSHx/Family Hx: As above, otherwise see below   Type 2 diabetes mellitus    Hypertension    HLD (hyperlipidemia)    End stage renal disease    Bone spur    Anemia    Injury of right wrist    History of hyperkalemia    No pertinent past medical history        Social Hx:  No current use of tobacco, alcohol, or illicit drugs  Lives with ***    Medications:  MEDICATIONS  (STANDING):  acetylcysteine 10%  Inhalation 4 milliLiter(s) Inhalation every 6 hours  aMIOdarone    Tablet 200 milliGRAM(s) Oral daily  ascorbic acid 500 milliGRAM(s) Oral daily  aspirin  chewable 81 milliGRAM(s) Oral daily  atorvastatin 80 milliGRAM(s) Oral at bedtime  chlorhexidine 0.12% Liquid 15 milliLiter(s) Oral Mucosa every 12 hours  chlorhexidine 2% Cloths 1 Application(s) Topical daily  chlorhexidine 4% Liquid 1 Application(s) Topical <User Schedule>  dextrose 50% Injectable 50 milliLiter(s) IV Push every 15 minutes  epoetin ignacia (EPOGEN) Injectable 63891 Unit(s) IV Push <User Schedule>  heparin   Injectable 5000 Unit(s) SubCutaneous every 8 hours  insulin lispro (ADMELOG) corrective regimen sliding scale   SubCutaneous every 6 hours  levalbuterol Inhalation 0.63 milliGRAM(s) Inhalation every 8 hours  levoFLOXacin IVPB 500 milliGRAM(s) IV Intermittent every 48 hours  melatonin 5 milliGRAM(s) Oral at bedtime  methocarbamol 500 milliGRAM(s) Oral every 8 hours  metoprolol tartrate 12.5 milliGRAM(s) Oral every 12 hours  mirtazapine 7.5 milliGRAM(s) Oral daily  Nephro-elicia 1 Tablet(s) Oral daily  pantoprazole  Injectable 40 milliGRAM(s) IV Push daily  polyethylene glycol 3350 17 Gram(s) Oral two times a day  sodium chloride 0.65% Nasal 1 Spray(s) Both Nostrils every 12 hours  sodium chloride 0.9%. 1000 milliLiter(s) (10 mL/Hr) IV Continuous <Continuous>  traZODone 50 milliGRAM(s) Oral at bedtime  vancomycin  IVPB 1000 milliGRAM(s) IV Intermittent every 24 hours    MEDICATIONS  (PRN):  acetaminophen     Tablet .. 650 milliGRAM(s) Oral every 6 hours PRN Mild Pain (1 - 3)  lidocaine/prilocaine Cream 1 Application(s) Topical daily PRN pre-HD  oxyCODONE    IR 5 milliGRAM(s) Oral every 4 hours PRN Moderate Pain (4 - 6)  sodium chloride 0.9% lock flush 10 milliLiter(s) IV Push every 1 hour PRN Pre/post blood products, medications, blood draw, and to maintain line patency      Vitals:  T(C): 36.8 (02-07-25 @ 14:25), Max: 37.2 (02-07-25 @ 04:00)  HR: 63 (02-07-25 @ 15:49) (60 - 75)  BP: 149/71 (02-07-25 @ 14:25) (109/60 - 154/75)  RR: 15 (02-07-25 @ 14:25) (5 - 23)  SpO2: 100% (02-07-25 @ 15:49) (93% - 100%)    Physical Examination: INCOMPLETE  General - NAD    Neurologic Exam:  Mental status - Awake, Alert, Oriented to person, place, and time. Speech fluent, repetition and naming intact. Follows simple and complex commands. Attention/concentration, recent and remote memory (including registration and recall), and fund of knowledge intact    Cranial nerves - PERRLA, VFF, EOMI, face sensation (V1-V3) intact b/l, facial strength intact without asymmetry b/l, hearing intact b/l, palate with symmetric elevation, trapezius OR sternocleidomastiod 5/5 strength b/l, tongue midline on protrusion with full lateral movement    Motor - Normal bulk and tone throughout. No pronator drift.  Strength testing            Deltoid      Biceps      Triceps     Wrist Extension    Wrist Flexion     Interossei         R            5                 5               5                     5                              5                        5                 5  L             5                 5               5                     5                              5                        5                 5              Hip Flexion    Hip Extension    Knee Flexion    Knee Extension    Dorsiflexion    Plantar Flexion  R              5                           5                       5                           5                            5                          5  L              5                           5                        5                           5                            5                          5    Sensation - Light touch/temperature OR pain/vibration intact throughout    DTR's -             Biceps      Triceps     Brachioradialis      Patellar    Ankle    Toes/plantar response  R             2+             2+                  2+                       2+            2+                 Down  L              2+             2+                 2+                        2+           2+                 Down    Coordination - Finger to Nose and heel to shin intact b/l. No tremors appreciated    Gait and station - Normal casual gait. Romberg (-)    Labs:                        7.5    9.48  )-----------( 194      ( 07 Feb 2025 00:35 )             24.3     02-07    130[L]  |  94[L]  |  35[H]  ----------------------------<  123[H]  4.3   |  24  |  4.37[H]    Ca    8.9      07 Feb 2025 00:34  Phos  2.8     02-07  Mg     2.3     02-07    TPro  5.9[L]  /  Alb  2.6[L]  /  TBili  0.4  /  DBili  x   /  AST  15  /  ALT  11  /  AlkPhos  100  02-07    CAPILLARY BLOOD GLUCOSE  115 (07 Feb 2025 00:00)      POCT Blood Glucose.: 139 mg/dL (07 Feb 2025 12:17)    LIVER FUNCTIONS - ( 07 Feb 2025 00:34 )  Alb: 2.6 g/dL / Pro: 5.9 g/dL / ALK PHOS: 100 U/L / ALT: 11 U/L / AST: 15 U/L / GGT: x               CSF:                  Radiology:     Neurology - Consult Note    -  Spectra: [phone] ([facility]), [phone] ([facility])  -  Information from CTU notes and admission HP  54M RH with PMH HTN, HLD, ESRD on HD MWF via LUE AVF, ascites (requiring repeat paracenteses q4-6wks), NICM with moderate LV dysfunction and CAD NSTEMI 4/7/2024 s/p atherectomy, IVL, SALLIE, intervention of LAD branch D1 (PTCA atherectomy, atherectomy and SALLIE on 4/11/2024), recently admitted from 09/27-12/02 for acute cholecystitis s/p cholecystectomy presented to OSH ED 12/18/2024 with shortness of breath for 1day. Patient noted weight gain. In Ed was hypoxic, hypertroponemia, and CXR w fluid overload. Patient admitted for suspected NSTEMI/AHRF management. Stress test was abnormal with 1mm inferior STD, reversible ischemia in the inferior wall and fixed defects in the lateral, anterior and apical walls with post stress EF of 24%. Pt was then transferred to [facility] for cardiac cath which showed pLAD 70% ISR, mLCx 70%, D1 severe dz. Patient transferred to [facility] CTS Dr. Rivers for CABG eval.   Hospital stay:  12/23 Cardiac cath  pLAD 70% ISR, mLCx 70%, D1 severe dz.   12/31-POD#1-C3L free LIMA-LAD; SVG-Diag; SVG-leftPL Awake OOB extubated Sinus rhythm on Dobutamine gtt  01/2025-02/2025: hospital stay complicated by multiple extubation failures, ascites necessitating taps, and continued respiratory failure necessitating multiple bronchoscopies  02/04-Awake alert on T collar-TC during the day and PC: 12/10//15//50% at night - intermittent rapid AF noted  02/07-Awake had Bronch 02/06. Patient reported L foot drop to infectious disease onset four days prior. Neurology consulted for foot drop. No head or leg imaging conducted yet.    Patient evaluated at bedside by neurology resident. Patient complained of LLE droop at ankle w weakness on dorsiflexion as well numbness in LLE from knee circumferentially distally thru rest of extremity. Onset two days. He reports that the symptoms began after he slept on his right side 2 days ago, after which he had a "Charley horse" in his left hip. Since then, he has been unable to dorsiflex the left ankle, has decreased ability to extend the left hallux, and has numbness below the left knee. He denies pain at rest anywhere in the left lower extremity, but reports significant tenderness when the left calf is squeezed. He reports that he last walked sometime yesterday and has not been using SCDs during this admission. The patient HAS been receiving heparin dvt ppx.      Allergies:  No Known Allergies      PMHx/PSHx/Family Hx: As above, otherwise see below   Type 2 diabetes mellitus    Hypertension    HLD (hyperlipidemia)    End stage renal disease    Bone spur    Anemia    Injury of right wrist    History of hyperkalemia    No pertinent past medical history        Social Hx:  Lives with wife    Medications:  MEDICATIONS  (STANDING):  acetylcysteine 10%  Inhalation 4 milliLiter(s) Inhalation every 6 hours  aMIOdarone    Tablet 200 milliGRAM(s) Oral daily  ascorbic acid 500 milliGRAM(s) Oral daily  aspirin  chewable 81 milliGRAM(s) Oral daily  atorvastatin 80 milliGRAM(s) Oral at bedtime  chlorhexidine 0.12% Liquid 15 milliLiter(s) Oral Mucosa every 12 hours  chlorhexidine 2% Cloths 1 Application(s) Topical daily  chlorhexidine 4% Liquid 1 Application(s) Topical <User Schedule>  dextrose 50% Injectable 50 milliLiter(s) IV Push every 15 minutes  epoetin ignacia (EPOGEN) Injectable 62963 Unit(s) IV Push <User Schedule>  heparin   Injectable 5000 Unit(s) SubCutaneous every 8 hours  insulin lispro (ADMELOG) corrective regimen sliding scale   SubCutaneous every 6 hours  levalbuterol Inhalation 0.63 milliGRAM(s) Inhalation every 8 hours  levoFLOXacin IVPB 500 milliGRAM(s) IV Intermittent every 48 hours  melatonin 5 milliGRAM(s) Oral at bedtime  methocarbamol 500 milliGRAM(s) Oral every 8 hours  metoprolol tartrate 12.5 milliGRAM(s) Oral every 12 hours  mirtazapine 7.5 milliGRAM(s) Oral daily  Nephro-elicia 1 Tablet(s) Oral daily  pantoprazole  Injectable 40 milliGRAM(s) IV Push daily  polyethylene glycol 3350 17 Gram(s) Oral two times a day  sodium chloride 0.65% Nasal 1 Spray(s) Both Nostrils every 12 hours  sodium chloride 0.9%. 1000 milliLiter(s) (10 mL/Hr) IV Continuous <Continuous>  traZODone 50 milliGRAM(s) Oral at bedtime  vancomycin  IVPB 1000 milliGRAM(s) IV Intermittent every 24 hours    MEDICATIONS  (PRN):  acetaminophen     Tablet .. 650 milliGRAM(s) Oral every 6 hours PRN Mild Pain (1 - 3)  lidocaine/prilocaine Cream 1 Application(s) Topical daily PRN pre-HD  oxyCODONE    IR 5 milliGRAM(s) Oral every 4 hours PRN Moderate Pain (4 - 6)  sodium chloride 0.9% lock flush 10 milliLiter(s) IV Push every 1 hour PRN Pre/post blood products, medications, blood draw, and to maintain line patency      Vitals:  T(C): 36.8 (02-07-25 @ 14:25), Max: 37.2 (02-07-25 @ 04:00)  HR: 63 (02-07-25 @ 15:49) (60 - 75)  BP: 149/71 (02-07-25 @ 14:25) (109/60 - 154/75)  RR: 15 (02-07-25 @ 14:25) (5 - 23)  SpO2: 100% (02-07-25 @ 15:49) (93% - 100%)    Physical Examination:   General - NAD  Extremities - Lower extremities with equal circumference measured at the ankle (22cm). Left calf significantly tender when squeezed w palpable cord between medial head of gastrocnemius and soleus. right calf with minimal tenderness when squeezed and wo palpable cord. LLE also w linear scab along superior border of saphenous vein (along superior portion of head of medial body of gastrocnemius). Patient wife reports that she thinks patient surgical access for one of the cath procedures was thru the leg. No edema or cyanosis of the lower extremities. BLE w darkened skin circumferentially around middle tibia distally to remainder of ankle. L hip atraumatic, non tender and wo palpable muscle knots.    Neurologic Exam:  Mental status - Awake, Alert, Oriented to person, place, and time. mute dt tracheostomy tube, but able to communicate coherently by writing on a whiteboard. Follows simple and complex commands. Attention and recent and remote memory were intact.    Cranial nerves - PERRLA, VFF, EOMI, face sensation (V1-V3) intact b/l, facial strength intact without asymmetry b/l, palate with symmetric elevation, trapezius 5/5 strength b/l, tongue midline on protrusion.    Motor - Normal bulk and tone throughout.   Strength testing            Deltoid      Biceps      Triceps     Wrist Extension                    Wrist Flexion             R            5                 5               5                     5                              5                        5                 L             5                 5               5                     5                              5                        5                              Hip Flexion    Hip Extension    Knee Flexion                  Knee Extension    Dorsiflexion    Plantar Flexion  R              5                           5                       5                           5                            5                          5  L              5                           5                        5                           5                            1                          5  *extensor of L hallux 2/5    Sensation -   ---In LLE Decreased light touch, temperature, and vibration from tibial tuberosity distally in complete circumferential distribution thru out including the toes; dorsal and plantar aspects of the foot,; and medial, lateral, and posterior aspects of the calf. Light touch, temperature, and vibration intact above the knee in the LLE.   ---Light touch, temperature, and vibration intact throughout the RLE.     DTR's -             Biceps      Triceps     Brachioradialis               Patellar    Ankle    Toes/plantar response  R             2+             2+                  2+                       2+            2+                 Down  L              2+             2+                 2+                        2+           2+                 Down    Gait and station - Deferred dt fall risk    Labs:                        7.5    9.48  )-----------( 194      ( 07 Feb 2025 00:35 )             24.3     02-07    130[L]  |  94[L]  |  35[H]  ----------------------------<  123[H]  4.3   |  24  |  4.37[H]    Ca    8.9      07 Feb 2025 00:34  Phos  2.8     02-07  Mg     2.3     02-07    TPro  5.9[L]  /  Alb  2.6[L]  /  TBili  0.4  /  DBili  x   /  AST  15  /  ALT  11  /  AlkPhos  100  02-07    CAPILLARY BLOOD GLUCOSE  115 (07 Feb 2025 00:00)      POCT Blood Glucose.: 139 mg/dL (07 Feb 2025 12:17)    LIVER FUNCTIONS - ( 07 Feb 2025 00:34 )  Alb: 2.6 g/dL / Pro: 5.9 g/dL / ALK PHOS: 100 U/L / ALT: 11 U/L / AST: 15 U/L / GGT: x

## 2025-02-07 NOTE — PROGRESS NOTE ADULT - SUBJECTIVE AND OBJECTIVE BOX
MR#[id number]  PATIENT NAME:RAAD CANO    DATE OF SERVICE: 02-07-25 @ 07:01  Patient was seen and examined by Solo Quick MD on    02-07-25 @ 07:01 .  Interim events noted.Consultant notes ,Labs,Telemetry reviewed by me       HOSPITAL COURSE: HPI:  54M with PMH of HTN, HLD, ESRD on HD MWF via LUE AVF, ascites (requiring repeat paracenteses q4-6wks), NICM with moderate LV dysfunction w/ EF 35-40%(2023) and CAD s/p atherectomy + SALLIE to D1(4/11/2024) presents to [facility] transferred from [facility]. Patient initially presented to [facility] ED with shortness of breath. Of note he was recently admitted from 09/27-12/02 for acute cholecystitis s/p cholecystectomy. In [facility] ED, pt was hypoxic to 86% on RA, trop 1.7K, EKG no new changes, CXR fluid overload. Admitted for AHRF 2/2 fluid overload. Pt received HD on 12/18, 12/19, 12/20 and 12/22. DAPT was resumed, TTE showed improvement in LVEF to 40-45%. Stress test was abnormal with 1mm inferior STD, reversible ischemia in the inferior wall and fixed defects in the lateral, anterior and apical walls with post stress EF of 24%. Pt was then transferred to [facility] for cardiac cath which showed pLAD 70% ISR, mLCx 70%, D1 severe dz. Patient now transferred to [facility] CTS Dr. Rivers for CABG eval. Patient denies any current SOB or chest pain on admission. Otherwise denies headaches, abdominal pain, urinary or bowel changes, fevers, chills, N/V/D, sick contacts.  (24 Dec 2024 05:07)      INTERIM EVENTS:Patient seen at bedside ,interim events noted.  12/23 Cardiac cath  pLAD 70% ISR, mLCx 70%, D1 severe dz.   12/31-POD#1-C3L free LIMA-LAD; SVG-Diag; SVG-leftPL Awake OOB extubated Sinus rhythm on Dobutamine gtt  02/04-Awake alert on T collar-TC during the day and PC: 12/10//15//50% at night-interrmittent rapid AF noted  02/07-Awake had Bronch yesterday-          PMH -reviewed admission note, no change since admission  MEDICATIONS  (STANDING):  acetylcysteine 10%  Inhalation 4 milliLiter(s) Inhalation every 6 hours  aMIOdarone    Tablet 200 milliGRAM(s) Oral daily  ascorbic acid 500 milliGRAM(s) Oral daily  aspirin  chewable 81 milliGRAM(s) Oral daily  atorvastatin 80 milliGRAM(s) Oral at bedtime  chlorhexidine 0.12% Liquid 15 milliLiter(s) Oral Mucosa every 12 hours  chlorhexidine 2% Cloths 1 Application(s) Topical daily  chlorhexidine 4% Liquid 1 Application(s) Topical <User Schedule>  dextrose 50% Injectable 50 milliLiter(s) IV Push every 15 minutes  epoetin ignacia (EPOGEN) Injectable 76847 Unit(s) IV Push <User Schedule>  heparin   Injectable 5000 Unit(s) SubCutaneous every 8 hours  insulin lispro (ADMELOG) corrective regimen sliding scale   SubCutaneous every 6 hours  levalbuterol Inhalation 0.63 milliGRAM(s) Inhalation every 8 hours  levoFLOXacin IVPB 500 milliGRAM(s) IV Intermittent every 48 hours  melatonin 5 milliGRAM(s) Oral at bedtime  methocarbamol 500 milliGRAM(s) Oral every 8 hours  metoprolol tartrate 12.5 milliGRAM(s) Oral every 12 hours  mirtazapine 7.5 milliGRAM(s) Oral daily  Nephro-elicia 1 Tablet(s) Oral daily  pantoprazole  Injectable 40 milliGRAM(s) IV Push daily  polyethylene glycol 3350 17 Gram(s) Oral two times a day  sodium chloride 0.65% Nasal 1 Spray(s) Both Nostrils every 12 hours  sodium chloride 0.9%. 1000 milliLiter(s) (10 mL/Hr) IV Continuous <Continuous>  traZODone 50 milliGRAM(s) Oral at bedtime  vancomycin  IVPB 1000 milliGRAM(s) IV Intermittent every 24 hours    MEDICATIONS  (PRN):  acetaminophen     Tablet .. 650 milliGRAM(s) Oral every 6 hours PRN Mild Pain (1 - 3)  lidocaine/prilocaine Cream 1 Application(s) Topical daily PRN pre-HD  oxyCODONE    IR 5 milliGRAM(s) Oral every 4 hours PRN Moderate Pain (4 - 6)  sodium chloride 0.9% lock flush 10 milliLiter(s) IV Push every 1 hour PRN Pre/post blood products, medications, blood draw, and to maintain line patency            REVIEW OF SYSTEMS:  Constitutional: [ ] fever, [ ]weight loss,  [x ]fatigue [ ]weight gain  Eyes: [ ] visual changes  Respiratory: [ ]shortness of breath;  [ ] cough, [ ]wheezing, [ ]chills, [ ]hemoptysis  Cardiovascular: [ ] chest pain, [ ]palpitations, [ ]dizziness,  [ ]leg swelling[ ]orthopnea[ ]PND  Gastrointestinal: [ ] abdominal pain, [ ]nausea, [ ]vomiting,  [ ]diarrhea [ ]Constipation [ ]Melena  Genitourinary: [ ] dysuria, [ ] hematuria [ ]Vigil  Neurologic: [ ] headaches [ ] tremors[ ]weakness [ ]Paralysis Right[ ] Left[ ]  Skin: [ ] itching, [ ]burning, [ ] rashes  Endocrine: [ ] heat or cold intolerance  Musculoskeletal: [ ] joint pain or swelling; [x ] muscle, back, or extremity pain  Psychiatric: [ ] depression, [ ]anxiety, [ ]mood swings, or [ ]difficulty sleeping  Hematologic: [ ] easy bruising, [ ] bleeding gums    [ ] All remaining systems negative except as per above.   [ ]Unable to obtain.  [x] No change in ROS since admission      Vital Signs Last 24 Hrs  T(C): 37.2 (07 Feb 2025 04:00), Max: 37.2 (07 Feb 2025 04:00)  T(F): 99 (07 Feb 2025 04:00), Max: 99 (07 Feb 2025 04:00)  HR: 62 (07 Feb 2025 06:00) (60 - 108)  BP: 123/67 (07 Feb 2025 06:00) (118/65 - 159/89)  BP(mean): 90 (07 Feb 2025 06:00) (85 - 119)  RR: 16 (07 Feb 2025 06:00) (15 - 37)  SpO2: 99% (07 Feb 2025 06:00) (92% - 100%)    Parameters below as of 07 Feb 2025 05:30  Patient On (Oxygen Delivery Method): ventilator      I&O's Summary    06 Feb 2025 07:01  -  07 Feb 2025 07:00  --------------------------------------------------------  IN: 1640 mL / OUT: 0 mL / NET: 1640 mL        PHYSICAL EXAM:  General: No acute distress BMI-28  HEENT: EOMI, PERRL  Neck: Supple, [ ] JVD  Lungs: Equal air entry bilaterally; [ ] rales [ ] wheezing [ ] rhonchi  Heart: Regular rate and rhythm; [x ] murmur   2/6 [ x] systolic [ ] diastolic [ ] radiation[ ] rubs [ ]  gallops  Abdomen: Nontender, bowel sounds present  Extremities: No clubbing, cyanosis, [ ] edema [ ]Pulses  equal and intact  Nervous system:  Alert & Oriented X3, no focal deficits  Psychiatric: Normal affect  Skin: No rashes or lesions    LABS:  02-07    130[L]  |  94[L]  |  35[H]  ----------------------------<  123[H]  4.3   |  24  |  4.37[H]    Ca    8.9      07 Feb 2025 00:34  Phos  2.8     02-07  Mg     2.3     02-07    TPro  5.9[L]  /  Alb  2.6[L]  /  TBili  0.4  /  DBili  x   /  AST  15  /  ALT  11  /  AlkPhos  100  02-07    Creatinine Trend: 4.37<--, 4.45<--, 5.10<--, 4.58<--, 4.12<--, 5.74<--                        7.5    9.48  )-----------( 194      ( 07 Feb 2025 00:35 )             24.3      Xray Chest 1 View- PORTABLE-Urgent (Xray Chest 1 View- PORTABLE-Urgent .) (02.06.25 @ 14:42) >  IMPRESSION:    Tracheostomy noted.  Enteric tube visualized coursing below the diaphragm with tip terminating  out of view.  Bilateral diffuse perihilar opacities which may represent pulmonary edema  or pneumonia.  No pneumothorax detected.     12 Lead ECG (02.04.25 @ 16:00) >   aflutter wiht variable block  LEFT AXIS DEVIATION  INCOMPLETE RIGHT BUNDLE BRANCH BLOCK  ANTERIOR INFARCT (CITED ON OR BEFORE 04-FEB-2025)  ABNORMAL ECG       TTE Limited W or WO Ultrasound Enhancing Agent (02.03.25 @ 15:31) >  CONCLUSIONS:      1.Left ventricular cavity is normal in size. Left ventricular systolic function is normal.   2. Severely enlarged right ventricular cavity size and reduced right ventricular systolic function.   3. Moderate pericardial effusion noted adjacent to the right atrium and small pericardial effusion noted adjacent to the anterior right ventricle with no echocardiographic evidence of tamponade physiology: evidence of pericardial constriction with septal bounce.   4. The inferior vena cava is dilated measuring 2.30 cm in diameter, (dilated >2.1cm) with abnormal inspiratory collapse (abnormal <50%) consistent with elevated right atrial pressure (~15, range 10-20mmHg).

## 2025-02-07 NOTE — PROGRESS NOTE ADULT - SUBJECTIVE AND OBJECTIVE BOX
[facility]    Dr. Nica Styles     Office [phone] (9 am to 5 pm)  Service: [phone] (5pm to 9am)  Also Available on TEAMS      RENAL PROGRESS NOTE: DATE OF SERVICE 02-07-25 @ 14:44    Patient is a 54y old  Male who presents with a chief complaint of CAD s/p CABG (07 Feb 2025 07:00)      Patient seen and examined at bedside. No chest pain/sob    VITALS:  T(F): 98.6 (02-07-25 @ 12:00), Max: 99 (02-07-25 @ 04:00)  HR: 63 (02-07-25 @ 14:00)  BP: 154/75 (02-07-25 @ 14:00)  RR: 13 (02-07-25 @ 14:00)  SpO2: 100% (02-07-25 @ 14:00)  Wt(kg): --    02-06 @ 07:01  -  02-07 @ 07:00  --------------------------------------------------------  IN: 1640 mL / OUT: 0 mL / NET: 1640 mL    02-07 @ 07:01  -  02-07 @ 14:44  --------------------------------------------------------  IN: 420 mL / OUT: 0 mL / NET: 420 mL          PHYSICAL EXAM:  Constitutional: NAD  Neck: No JVD  Respiratory: CTAB, no wheezes, rales or rhonchi  Cardiovascular: S1, S2, RRR  Gastrointestinal: BS+, soft, NT/ND  Extremities: No peripheral edema    Hospital Medications:   MEDICATIONS  (STANDING):  acetylcysteine 10%  Inhalation 4 milliLiter(s) Inhalation every 6 hours  aMIOdarone    Tablet 200 milliGRAM(s) Oral daily  ascorbic acid 500 milliGRAM(s) Oral daily  aspirin  chewable 81 milliGRAM(s) Oral daily  atorvastatin 80 milliGRAM(s) Oral at bedtime  chlorhexidine 0.12% Liquid 15 milliLiter(s) Oral Mucosa every 12 hours  chlorhexidine 2% Cloths 1 Application(s) Topical daily  chlorhexidine 4% Liquid 1 Application(s) Topical <User Schedule>  dextrose 50% Injectable 50 milliLiter(s) IV Push every 15 minutes  epoetin ignacia (EPOGEN) Injectable 59846 Unit(s) IV Push <User Schedule>  heparin   Injectable 5000 Unit(s) SubCutaneous every 8 hours  insulin lispro (ADMELOG) corrective regimen sliding scale   SubCutaneous every 6 hours  levalbuterol Inhalation 0.63 milliGRAM(s) Inhalation every 8 hours  levoFLOXacin IVPB 500 milliGRAM(s) IV Intermittent every 48 hours  melatonin 5 milliGRAM(s) Oral at bedtime  methocarbamol 500 milliGRAM(s) Oral every 8 hours  metoprolol tartrate 12.5 milliGRAM(s) Oral every 12 hours  mirtazapine 7.5 milliGRAM(s) Oral daily  Nephro-elicia 1 Tablet(s) Oral daily  pantoprazole  Injectable 40 milliGRAM(s) IV Push daily  polyethylene glycol 3350 17 Gram(s) Oral two times a day  sodium chloride 0.65% Nasal 1 Spray(s) Both Nostrils every 12 hours  sodium chloride 0.9%. 1000 milliLiter(s) (10 mL/Hr) IV Continuous <Continuous>  traZODone 50 milliGRAM(s) Oral at bedtime  vancomycin  IVPB 1000 milliGRAM(s) IV Intermittent every 24 hours      LABS:  02-07    130[L]  |  94[L]  |  35[H]  ----------------------------<  123[H]  4.3   |  24  |  4.37[H]    Ca    8.9      07 Feb 2025 00:34  Phos  2.8     02-07  Mg     2.3     02-07    TPro  5.9[L]  /  Alb  2.6[L]  /  TBili  0.4  /  DBili      /  AST  15  /  ALT  11  /  AlkPhos  100  02-07    Creatinine Trend: 4.37 <--, 4.45 <--, 5.10 <--, 4.58 <--, 4.12 <--, 5.74 <--, 5.38 <--, 4.78 <--, 3.78 <--    Albumin: 2.6 g/dL (02-07 @ 00:34)  Phosphorus: 2.8 mg/dL (02-07 @ 00:34)                              7.5    9.48  )-----------( 194      ( 07 Feb 2025 00:35 )             24.3     Urine Studies:  Urinalysis - [02-07-25 @ 00:34]      Color  / Appearance  / SG  / pH       Gluc 123 / Ketone   / Bili  / Urobili        Blood  / Protein  / Leuk Est  / Nitrite       RBC  / WBC  / Hyaline  / Gran  / Sq Epi  / Non Sq Epi  / Bacteria       Iron 29, TIBC 143, %sat 20      [12-19-24 @ 05:00]  Ferritin 904      [12-19-24 @ 05:00]  TSH 4.75      [12-24-24 @ 06:50]        RADIOLOGY & ADDITIONAL STUDIES:

## 2025-02-07 NOTE — PROGRESS NOTE ADULT - SUBJECTIVE AND OBJECTIVE BOX
Patient is a 54y old  Male who presents with a chief complaint of CAD s/p CABG (07 Feb 2025 07:00)    Being followed by ID for        Interval history:  No other acute events      ROS:  No cough,SOB,CP  No N/V/D  No abd pain  No urinary complaints  No HA  No joint or limb pain  No other complaints    PAST MEDICAL & SURGICAL HISTORY:  Type 2 diabetes mellitus      Hypertension      End stage renal disease  started HD 2/2019 T, Th, Sat via right chest permacath      Bone spur  right shoulder- hx of - sx done      Anemia      Injury of right wrist  hx of at age 14      History of hyperkalemia  before HD- K-6.2 - to repeat in am of sx, pt had HD today      S/P arthroscopy of right shoulder  2010      History of vascular access device  right chest permacath - 2/2019      H/O right wrist surgery  tendons repair - at age 14      AV fistula      H/O ventral hernia repair      S/P cholecystectomy        Allergies    No Known Allergies    Intolerances      Antimicrobials:    levoFLOXacin IVPB 500 milliGRAM(s) IV Intermittent every 48 hours  vancomycin  IVPB 1000 milliGRAM(s) IV Intermittent every 24 hours    MEDICATIONS  (STANDING):  acetylcysteine 10%  Inhalation 4 milliLiter(s) Inhalation every 6 hours  aMIOdarone    Tablet 200 milliGRAM(s) Oral daily  ascorbic acid 500 milliGRAM(s) Oral daily  aspirin  chewable 81 milliGRAM(s) Oral daily  atorvastatin 80 milliGRAM(s) Oral at bedtime  chlorhexidine 0.12% Liquid 15 milliLiter(s) Oral Mucosa every 12 hours  chlorhexidine 2% Cloths 1 Application(s) Topical daily  chlorhexidine 4% Liquid 1 Application(s) Topical <User Schedule>  dextrose 50% Injectable 50 milliLiter(s) IV Push every 15 minutes  epoetin ignacia (EPOGEN) Injectable 24119 Unit(s) IV Push <User Schedule>  heparin   Injectable 5000 Unit(s) SubCutaneous every 8 hours  insulin lispro (ADMELOG) corrective regimen sliding scale   SubCutaneous every 6 hours  levalbuterol Inhalation 0.63 milliGRAM(s) Inhalation every 8 hours  levoFLOXacin IVPB 500 milliGRAM(s) IV Intermittent every 48 hours  melatonin 5 milliGRAM(s) Oral at bedtime  methocarbamol 500 milliGRAM(s) Oral every 8 hours  metoprolol tartrate 12.5 milliGRAM(s) Oral every 12 hours  mirtazapine 7.5 milliGRAM(s) Oral daily  Nephro-elicia 1 Tablet(s) Oral daily  pantoprazole  Injectable 40 milliGRAM(s) IV Push daily  polyethylene glycol 3350 17 Gram(s) Oral two times a day  sodium chloride 0.65% Nasal 1 Spray(s) Both Nostrils every 12 hours  sodium chloride 0.9%. 1000 milliLiter(s) (10 mL/Hr) IV Continuous <Continuous>  traZODone 50 milliGRAM(s) Oral at bedtime  vancomycin  IVPB 1000 milliGRAM(s) IV Intermittent every 24 hours      Vital Signs Last 24 Hrs  T(C): 36.8 (02-07-25 @ 14:25), Max: 37.2 (02-07-25 @ 04:00)  T(F): 98.3 (02-07-25 @ 14:25), Max: 99 (02-07-25 @ 04:00)  HR: 61 (02-07-25 @ 14:25) (60 - 75)  BP: 149/71 (02-07-25 @ 14:25) (109/60 - 154/75)  BP(mean): 107 (02-07-25 @ 14:00) (80 - 107)  RR: 15 (02-07-25 @ 14:25) (5 - 23)  SpO2: 100% (02-07-25 @ 14:25) (93% - 100%)    Physical Exam:    Constitutional well preserved,comfortable,pleasant    HEENT PERRLA EOMI,No pallor or icterus    No oral exudate or erythema    Neck supple no JVD or LN    Chest Good AE,CTA    CVS RRR S1 S2 WNl No murmur or rub or gallop    Abd soft BS normal No tenderness no masses    Ext No cyanosis clubbing or edema    IV site no erythema tenderness or discharge    Joints no swelling or LOM    CNS AAO X 3 no focal    Lab Data:                          7.5    9.48  )-----------( 194      ( 07 Feb 2025 00:35 )             24.3       02-07    130[L]  |  94[L]  |  35[H]  ----------------------------<  123[H]  4.3   |  24  |  4.37[H]    Ca    8.9      07 Feb 2025 00:34  Phos  2.8     02-07  Mg     2.3     02-07    TPro  5.9[L]  /  Alb  2.6[L]  /  TBili  0.4  /  DBili  x   /  AST  15  /  ALT  11  /  AlkPhos  100  02-07      Urinalysis Basic - ( 07 Feb 2025 00:34 )    Color: x / Appearance: x / SG: x / pH: x  Gluc: 123 mg/dL / Ketone: x  / Bili: x / Urobili: x   Blood: x / Protein: x / Nitrite: x   Leuk Esterase: x / RBC: x / WBC x   Sq Epi: x / Non Sq Epi: x / Bacteria: x        Drainage  02-01-25   Growth in fluid media only Methicillin Resistant Staphylococcus aureus  Growth in fluid media only Serratia marcescens  --  Methicillin resistant Staphylococcus aureus  Serratia marcescens                Vancomycin Level, Trough: 8.9 ug/mL (02-06-25 @ 16:25)      WBC Count: 9.48 (02-07-25 @ 00:35)  WBC Count: 9.28 (02-06-25 @ 00:20)  WBC Count: 11.12 (02-05-25 @ 00:19)  WBC Count: 11.39 (02-04-25 @ 00:31)  WBC Count: 10.02 (02-03-25 @ 00:20)  WBC Count: 9.68 (02-02-25 @ 00:21)  WBC Count: 11.02 (02-01-25 @ 00:42)       Bilirubin Total: 0.4 mg/dL (02-07-25 @ 00:34)  Aspartate Aminotransferase (AST/SGOT): 15 U/L (02-07-25 @ 00:34)  Alanine Aminotransferase (ALT/SGPT): 11 U/L (02-07-25 @ 00:34)  Alkaline Phosphatase: 100 U/L (02-07-25 @ 00:34)  Bilirubin Total: 0.4 mg/dL (02-06-25 @ 00:20)  Aspartate Aminotransferase (AST/SGOT): 16 U/L (02-06-25 @ 00:20)  Alanine Aminotransferase (ALT/SGPT): 8 U/L (02-06-25 @ 00:20)  Alkaline Phosphatase: 113 U/L (02-06-25 @ 00:20)  Alkaline Phosphatase: 120 U/L (02-05-25 @ 00:18)  Bilirubin Total: 0.4 mg/dL (02-05-25 @ 00:18)  Aspartate Aminotransferase (AST/SGOT): 17 U/L (02-05-25 @ 00:18)  Alanine Aminotransferase (ALT/SGPT): 7 U/L (02-05-25 @ 00:18)  Bilirubin Total: 0.4 mg/dL (02-04-25 @ 12:35)  Aspartate Aminotransferase (AST/SGOT): 13 U/L (02-04-25 @ 12:35)  Alanine Aminotransferase (ALT/SGPT): 9 U/L (02-04-25 @ 12:35)  Alkaline Phosphatase: 133 U/L (02-04-25 @ 12:35)  Bilirubin Total: 0.4 mg/dL (02-04-25 @ 00:29)  Aspartate Aminotransferase (AST/SGOT): 15 U/L (02-04-25 @ 00:29)  Alanine Aminotransferase (ALT/SGPT): 9 U/L (02-04-25 @ 00:29)  Alkaline Phosphatase: 118 U/L (02-04-25 @ 00:29)  Bilirubin Total: 0.4 mg/dL (02-03-25 @ 00:20)  Aspartate Aminotransferase (AST/SGOT): 16 U/L (02-03-25 @ 00:20)  Alanine Aminotransferase (ALT/SGPT): 10 U/L (02-03-25 @ 00:20)  Alkaline Phosphatase: 122 U/L (02-03-25 @ 00:20)         Patient is a 54y old  Male who presents with a chief complaint of CAD s/p CABG (07 Feb 2025 07:00)    Being followed by ID for        Interval history:  pt notes left foot drop  pt denies achilles pain-pt believes its been going on for about 4 days  breathing stable  No other acute events      PAST MEDICAL & SURGICAL HISTORY:  Type 2 diabetes mellitus      Hypertension      End stage renal disease  started HD 2/2019 T, Th, Sat via right chest permacath      Bone spur  right shoulder- hx of - sx done      Anemia      Injury of right wrist  hx of at age 14      History of hyperkalemia  before HD- K-6.2 - to repeat in am of sx, pt had HD today      S/P arthroscopy of right shoulder  2010      History of vascular access device  right chest permacath - 2/2019      H/O right wrist surgery  tendons repair - at age 14      AV fistula      H/O ventral hernia repair      S/P cholecystectomy        Allergies    No Known Allergies    Intolerances      Antimicrobials:    levoFLOXacin IVPB 500 milliGRAM(s) IV Intermittent every 48 hours  vancomycin  IVPB 1000 milliGRAM(s) IV Intermittent every 24 hours    MEDICATIONS  (STANDING):  acetylcysteine 10%  Inhalation 4 milliLiter(s) Inhalation every 6 hours  aMIOdarone    Tablet 200 milliGRAM(s) Oral daily  ascorbic acid 500 milliGRAM(s) Oral daily  aspirin  chewable 81 milliGRAM(s) Oral daily  atorvastatin 80 milliGRAM(s) Oral at bedtime  chlorhexidine 0.12% Liquid 15 milliLiter(s) Oral Mucosa every 12 hours  chlorhexidine 2% Cloths 1 Application(s) Topical daily  chlorhexidine 4% Liquid 1 Application(s) Topical <User Schedule>  dextrose 50% Injectable 50 milliLiter(s) IV Push every 15 minutes  epoetin ignacia (EPOGEN) Injectable 31909 Unit(s) IV Push <User Schedule>  heparin   Injectable 5000 Unit(s) SubCutaneous every 8 hours  insulin lispro (ADMELOG) corrective regimen sliding scale   SubCutaneous every 6 hours  levalbuterol Inhalation 0.63 milliGRAM(s) Inhalation every 8 hours  levoFLOXacin IVPB 500 milliGRAM(s) IV Intermittent every 48 hours  melatonin 5 milliGRAM(s) Oral at bedtime  methocarbamol 500 milliGRAM(s) Oral every 8 hours  metoprolol tartrate 12.5 milliGRAM(s) Oral every 12 hours  mirtazapine 7.5 milliGRAM(s) Oral daily  Nephro-elicia 1 Tablet(s) Oral daily  pantoprazole  Injectable 40 milliGRAM(s) IV Push daily  polyethylene glycol 3350 17 Gram(s) Oral two times a day  sodium chloride 0.65% Nasal 1 Spray(s) Both Nostrils every 12 hours  sodium chloride 0.9%. 1000 milliLiter(s) (10 mL/Hr) IV Continuous <Continuous>  traZODone 50 milliGRAM(s) Oral at bedtime  vancomycin  IVPB 1000 milliGRAM(s) IV Intermittent every 24 hours      Vital Signs Last 24 Hrs  T(C): 36.8 (02-07-25 @ 14:25), Max: 37.2 (02-07-25 @ 04:00)  T(F): 98.3 (02-07-25 @ 14:25), Max: 99 (02-07-25 @ 04:00)  HR: 61 (02-07-25 @ 14:25) (60 - 75)  BP: 149/71 (02-07-25 @ 14:25) (109/60 - 154/75)  BP(mean): 107 (02-07-25 @ 14:00) (80 - 107)  RR: 15 (02-07-25 @ 14:25) (5 - 23)  SpO2: 100% (02-07-25 @ 14:25) (93% - 100%)    Physical Exam:    Constitutional well preserved,comfortable,pleasant    HEENT PERRLA EOMI,No pallor or icterus    No oral exudate or erythema    Neck supple no JVD or LN    Chest Good AE,CTA    CVS  S1 S2     Abd soft BS normal No tenderness    Ext lft foot drop     IV site no erythema tenderness or discharge    Joints no swelling or LOM        Lab Data:                          7.5    9.48  )-----------( 194      ( 07 Feb 2025 00:35 )             24.3       02-07    130[L]  |  94[L]  |  35[H]  ----------------------------<  123[H]  4.3   |  24  |  4.37[H]    Ca    8.9      07 Feb 2025 00:34  Phos  2.8     02-07  Mg     2.3     02-07    TPro  5.9[L]  /  Alb  2.6[L]  /  TBili  0.4  /  DBili  x   /  AST  15  /  ALT  11  /  AlkPhos  100  02-07          Drainage  02-01-25   Growth in fluid media only Methicillin Resistant Staphylococcus aureus  Growth in fluid media only Serratia marcescens  --  Methicillin resistant Staphylococcus aureus  Serratia marcescens                Vancomycin Level, Trough: 8.9 ug/mL (02-06-25 @ 16:25)      WBC Count: 9.48 (02-07-25 @ 00:35)  WBC Count: 9.28 (02-06-25 @ 00:20)  WBC Count: 11.12 (02-05-25 @ 00:19)  WBC Count: 11.39 (02-04-25 @ 00:31)  WBC Count: 10.02 (02-03-25 @ 00:20)  WBC Count: 9.68 (02-02-25 @ 00:21)  WBC Count: 11.02 (02-01-25 @ 00:42)       Bilirubin Total: 0.4 mg/dL (02-07-25 @ 00:34)  Aspartate Aminotransferase (AST/SGOT): 15 U/L (02-07-25 @ 00:34)  Alanine Aminotransferase (ALT/SGPT): 11 U/L (02-07-25 @ 00:34)  Alkaline Phosphatase: 100 U/L (02-07-25 @ 00:34)  Bilirubin Total: 0.4 mg/dL (02-06-25 @ 00:20)  Aspartate Aminotransferase (AST/SGOT): 16 U/L (02-06-25 @ 00:20)  Alanine Aminotransferase (ALT/SGPT): 8 U/L (02-06-25 @ 00:20)  Alkaline Phosphatase: 113 U/L (02-06-25 @ 00:20)  Alkaline Phosphatase: 120 U/L (02-05-25 @ 00:18)  Bilirubin Total: 0.4 mg/dL (02-05-25 @ 00:18)  Aspartate Aminotransferase (AST/SGOT): 17 U/L (02-05-25 @ 00:18)  Alanine Aminotransferase (ALT/SGPT): 7 U/L (02-05-25 @ 00:18)  Bilirubin Total: 0.4 mg/dL (02-04-25 @ 12:35)  Aspartate Aminotransferase (AST/SGOT): 13 U/L (02-04-25 @ 12:35)  Alanine Aminotransferase (ALT/SGPT): 9 U/L (02-04-25 @ 12:35)  Alkaline Phosphatase: 133 U/L (02-04-25 @ 12:35)  Bilirubin Total: 0.4 mg/dL (02-04-25 @ 00:29)  Aspartate Aminotransferase (AST/SGOT): 15 U/L (02-04-25 @ 00:29)  Alanine Aminotransferase (ALT/SGPT): 9 U/L (02-04-25 @ 00:29)  Alkaline Phosphatase: 118 U/L (02-04-25 @ 00:29)  Bilirubin Total: 0.4 mg/dL (02-03-25 @ 00:20)  Aspartate Aminotransferase (AST/SGOT): 16 U/L (02-03-25 @ 00:20)  Alanine Aminotransferase (ALT/SGPT): 10 U/L (02-03-25 @ 00:20)  Alkaline Phosphatase: 122 U/L (02-03-25 @ 00:20)         Patient is a 54y old  Male who presents with a chief complaint of CAD s/p CABG (07 Feb 2025 07:00)    Being followed by ID for        Interval history:  pt notes left foot drop and LLE numbness  pt denies achilles pain-pt believes its been going on for about 4 days  breathing stable  No other acute events      PAST MEDICAL & SURGICAL HISTORY:  Type 2 diabetes mellitus      Hypertension      End stage renal disease  started HD 2/2019 T, Th, Sat via right chest permacath      Bone spur  right shoulder- hx of - sx done      Anemia      Injury of right wrist  hx of at age 14      History of hyperkalemia  before HD- K-6.2 - to repeat in am of sx, pt had HD today      S/P arthroscopy of right shoulder  2010      History of vascular access device  right chest permacath - 2/2019      H/O right wrist surgery  tendons repair - at age 14      AV fistula      H/O ventral hernia repair      S/P cholecystectomy        Allergies    No Known Allergies    Intolerances      Antimicrobials:    levoFLOXacin IVPB 500 milliGRAM(s) IV Intermittent every 48 hours  vancomycin  IVPB 1000 milliGRAM(s) IV Intermittent every 24 hours    MEDICATIONS  (STANDING):  acetylcysteine 10%  Inhalation 4 milliLiter(s) Inhalation every 6 hours  aMIOdarone    Tablet 200 milliGRAM(s) Oral daily  ascorbic acid 500 milliGRAM(s) Oral daily  aspirin  chewable 81 milliGRAM(s) Oral daily  atorvastatin 80 milliGRAM(s) Oral at bedtime  chlorhexidine 0.12% Liquid 15 milliLiter(s) Oral Mucosa every 12 hours  chlorhexidine 2% Cloths 1 Application(s) Topical daily  chlorhexidine 4% Liquid 1 Application(s) Topical <User Schedule>  dextrose 50% Injectable 50 milliLiter(s) IV Push every 15 minutes  epoetin ignacia (EPOGEN) Injectable 67125 Unit(s) IV Push <User Schedule>  heparin   Injectable 5000 Unit(s) SubCutaneous every 8 hours  insulin lispro (ADMELOG) corrective regimen sliding scale   SubCutaneous every 6 hours  levalbuterol Inhalation 0.63 milliGRAM(s) Inhalation every 8 hours  levoFLOXacin IVPB 500 milliGRAM(s) IV Intermittent every 48 hours  melatonin 5 milliGRAM(s) Oral at bedtime  methocarbamol 500 milliGRAM(s) Oral every 8 hours  metoprolol tartrate 12.5 milliGRAM(s) Oral every 12 hours  mirtazapine 7.5 milliGRAM(s) Oral daily  Nephro-elicia 1 Tablet(s) Oral daily  pantoprazole  Injectable 40 milliGRAM(s) IV Push daily  polyethylene glycol 3350 17 Gram(s) Oral two times a day  sodium chloride 0.65% Nasal 1 Spray(s) Both Nostrils every 12 hours  sodium chloride 0.9%. 1000 milliLiter(s) (10 mL/Hr) IV Continuous <Continuous>  traZODone 50 milliGRAM(s) Oral at bedtime  vancomycin  IVPB 1000 milliGRAM(s) IV Intermittent every 24 hours      Vital Signs Last 24 Hrs  T(C): 36.8 (02-07-25 @ 14:25), Max: 37.2 (02-07-25 @ 04:00)  T(F): 98.3 (02-07-25 @ 14:25), Max: 99 (02-07-25 @ 04:00)  HR: 61 (02-07-25 @ 14:25) (60 - 75)  BP: 149/71 (02-07-25 @ 14:25) (109/60 - 154/75)  BP(mean): 107 (02-07-25 @ 14:00) (80 - 107)  RR: 15 (02-07-25 @ 14:25) (5 - 23)  SpO2: 100% (02-07-25 @ 14:25) (93% - 100%)    Physical Exam:    Constitutional well preserved,comfortable,pleasant    HEENT PERRLA EOMI,No pallor or icterus    No oral exudate or erythema    Neck supple no JVD or LN    Chest Good AE,CTA    CVS  S1 S2     Abd soft BS normal No tenderness    Ext lft foot drop     IV site no erythema tenderness or discharge    Joints no swelling or LOM        Lab Data:                          7.5    9.48  )-----------( 194      ( 07 Feb 2025 00:35 )             24.3       02-07    130[L]  |  94[L]  |  35[H]  ----------------------------<  123[H]  4.3   |  24  |  4.37[H]    Ca    8.9      07 Feb 2025 00:34  Phos  2.8     02-07  Mg     2.3     02-07    TPro  5.9[L]  /  Alb  2.6[L]  /  TBili  0.4  /  DBili  x   /  AST  15  /  ALT  11  /  AlkPhos  100  02-07          Drainage  02-01-25   Growth in fluid media only Methicillin Resistant Staphylococcus aureus  Growth in fluid media only Serratia marcescens  --  Methicillin resistant Staphylococcus aureus  Serratia marcescens                Vancomycin Level, Trough: 8.9 ug/mL (02-06-25 @ 16:25)      WBC Count: 9.48 (02-07-25 @ 00:35)  WBC Count: 9.28 (02-06-25 @ 00:20)  WBC Count: 11.12 (02-05-25 @ 00:19)  WBC Count: 11.39 (02-04-25 @ 00:31)  WBC Count: 10.02 (02-03-25 @ 00:20)  WBC Count: 9.68 (02-02-25 @ 00:21)  WBC Count: 11.02 (02-01-25 @ 00:42)       Bilirubin Total: 0.4 mg/dL (02-07-25 @ 00:34)  Aspartate Aminotransferase (AST/SGOT): 15 U/L (02-07-25 @ 00:34)  Alanine Aminotransferase (ALT/SGPT): 11 U/L (02-07-25 @ 00:34)  Alkaline Phosphatase: 100 U/L (02-07-25 @ 00:34)  Bilirubin Total: 0.4 mg/dL (02-06-25 @ 00:20)  Aspartate Aminotransferase (AST/SGOT): 16 U/L (02-06-25 @ 00:20)  Alanine Aminotransferase (ALT/SGPT): 8 U/L (02-06-25 @ 00:20)  Alkaline Phosphatase: 113 U/L (02-06-25 @ 00:20)  Alkaline Phosphatase: 120 U/L (02-05-25 @ 00:18)  Bilirubin Total: 0.4 mg/dL (02-05-25 @ 00:18)  Aspartate Aminotransferase (AST/SGOT): 17 U/L (02-05-25 @ 00:18)  Alanine Aminotransferase (ALT/SGPT): 7 U/L (02-05-25 @ 00:18)  Bilirubin Total: 0.4 mg/dL (02-04-25 @ 12:35)  Aspartate Aminotransferase (AST/SGOT): 13 U/L (02-04-25 @ 12:35)  Alanine Aminotransferase (ALT/SGPT): 9 U/L (02-04-25 @ 12:35)  Alkaline Phosphatase: 133 U/L (02-04-25 @ 12:35)  Bilirubin Total: 0.4 mg/dL (02-04-25 @ 00:29)  Aspartate Aminotransferase (AST/SGOT): 15 U/L (02-04-25 @ 00:29)  Alanine Aminotransferase (ALT/SGPT): 9 U/L (02-04-25 @ 00:29)  Alkaline Phosphatase: 118 U/L (02-04-25 @ 00:29)  Bilirubin Total: 0.4 mg/dL (02-03-25 @ 00:20)  Aspartate Aminotransferase (AST/SGOT): 16 U/L (02-03-25 @ 00:20)  Alanine Aminotransferase (ALT/SGPT): 10 U/L (02-03-25 @ 00:20)  Alkaline Phosphatase: 122 U/L (02-03-25 @ 00:20)

## 2025-02-07 NOTE — PROGRESS NOTE ADULT - ASSESSMENT
54M with HTN, HLD, ESRD on HD MWF via LUE AVF, ascites (requiring repeat paracenteses q4-6wks), NICM with moderate LV dysfunction w/ EF 35-40%() and CAD s/p atherectomy + SALLIE to D1 (2024) presents to [facility] 2024 as transfer from [facility] (via [facility]) where he presented  with SOB.   In [facility] ED, pt was hypoxic to 86% on RA, trop 1.7K, EKG no new changes, CXR fluid overload. Admitted for AHRF 2/2 fluid overload. Pt received HD on , ,  and . DAPT was resumed, TTE showed improvement in LVEF to 40-45%. Stress test was abnormal with 1mm inferior STD, reversible ischemia in the inferior wall and fixed defects in the lateral, anterior and apical walls with post stress EF of 24%. Pt was then transferred to [facility] for cardiac cath which showed pLAD 70% ISR, mLCx 70%, D1 severe dz. Patient now transferred to [facility] 2024 for CABG.       - underwent  C3L free LIMA-LAD; SVG-Diag; SVG-leftPL   - extubated   - hypotension and ECHO showing Systolic HF/depressed BIV Function, currently supported with /pressors.  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 spoke to pharm D - can't use bactrim b/o hyperkalemia so will give vanco/levaquin for 3-5 days dosed for HD ( day # 3) - d/c levaquin- I don't believe its an achilles issue but better to stop. back is much better  - surgical f/u appreciated   - yeast in bronch, not clear if true infection, monitor off antifungal for now  - acyclovir completed  - check cultures from bronch  likley one more dose of vancomycin   neuro input for foot drop  - local care for sacral decubitus, more comfortable       Marla Champion M.D. ,   please reach via teams   If no answer, or after 5PM/ weekends,  then please call  [phone]    Assessment and plan discussed with the primary team .    ID service will be covering over the weekend. Please call for acute issues or questions. [phone]

## 2025-02-07 NOTE — PROGRESS NOTE ADULT - ASSESSMENT
54M with PMH of HTN, HLD, ESRD on HD MWF via LUE AVF, ascites (requiring repeat paracenteses q4-6wks), NICM with moderate LV dysfunction w/ EF 35-40%() and CAD s/p atherectomy + SALLIE to D1(2024) presents to [facility] transferred from [facility]. Patient initially presented to [facility] ED with shortness of breath. Of note he was recently admitted from - for acute cholecystitis s/p cholecystectomy. In [facility] hospital ED, pt was hypoxic to 86% on RA, trop 1.7K, EKG no new changes, CXR fluid overload. Admitted for AHRF 2/2 fluid overload. Pt received HD on , ,  and . DAPT was resumed, TTE showed improvement in LVEF to 40-45%. Stress test was abnormal with 1mm inferior STD, reversible ischemia in the inferior wall and fixed defects in the lateral, anterior and apical walls with post stress EF of 24%.     Pt was then transferred to [facility] for cardiac cath which showed pLAD 70% ISR, mLCx 70%, D1 severe dz. Patient now transferred to [facility] CTS Dr. Rivers for CABG eval.# CAD s/p NSTEMI  Hx CAD s/p PCI LAD   Presented with ADHF elevated troponins  Positive NST1-Cath- pLAD 70% ISR, mLCx 70%, D1 severe dz.   TTE EF 35% Severe TRWas on Plavix-p2y12- 321 been off Plavix since -POD#1-C3L free LIMA-LAD; SVG-Diag; RLN-wxyfFB-Jiqtbunvg on  Sinus rhythm,Amio for AF prophylaxis  -OOB off  Sinus rhythm   -POD#3-On /pressors-EF 25-30% RV failure with transaminitis-Sinus Rworap57/03-POD#4-Was intubated for hypoxia-gradual hypotension on /pressors had IABP inserted this morning  -POD#5-s/p IABP insertion, sepsis/septic shock due to GNR/ECOLI HD cath placed   Bronched yesterday 1/3 - minimal secretions with rust-tinged sputum   ESBL-E Coli bacteremia on meropenam    - POD#7 Atrial Fib ,remains intubated weaning IABP 1:3,off pressors on CRRT  -IABP removed.Remains on vent-Alex 10ppm,off Levo- CVP 10 / MAP 71 / Hct 25.6% / Lactate 1.7-Atrial Fibrillation  -Intubated on Alex 10ppm, gtt, BP better-AF~~90's on Amio-had bronch still febrile Tmax 25064/-Extubated awake alert on HFNC AF,on Dobutrex-CRRT,Cultures no growth    01/10-OOB on BiPAP Sinus Rhythm on -SR/ MAP 57/ CVP 10/  Hct 26.7 / Lact 1.4  -s/p EDU/DCCV-Sinus rhythm on -SR / MAP 52 / CVP 12/ Hct 25.8 / Lact 0.7-had bronch with BAL Left lung  -Sinus rhythm on -for repeat Bronch-SR / MAP 67 / CVP 11 / Hct 27.4% / Lact 0.8  -s/p Trach on  TTE EF 55%-SR / CVP 2 / MAP 63 / Hct 25.9% / Lactate 0.9  -Awake on Trach collar off  c/o buttock pain had bronch yesterday-BAL-Sinus rhythm  -Sinus rhythm on vent at night-BP stable may need to increase hydrallazine 25 mg q8  -TTE EF 60% still needs Vent support at night Bradycardic trial of Bryce now back on   -Awake alert Sinus rhythm BP elevated  remains on ,Nocturnal vent support  resume Hydralazine 25mg q8  -Reverted to AF rvr  -s/p BRonc/BAL debridement sacral decub  -Awake noted AF RVR no further bradyarrhythmias-Start BBlockers      # Acute on Chronic Systolic Heart Failure now improved  EF-35% ,severe TR  Had HD post op  GDMT post op  LVEF improved post bypass but now at 23-30%-BiV dysfunction on  now off pressors  -TTE EF 40%,trace effusion  ECG c/w pericarditis-was on colchicine   01/15-TTE EF 55%  -TTE EF 60%   -Normal LV systolic function Moderate pericardial effusion  -On TC-HF and Vent support at nights     Vascular evaluated  AVGraft /?steal causing RV failure-'' Very low suspicion of steal Sd and AVF causing current clinical state. ''   VA Duplex Hemodialysis Access, Left (25 @ 13:35) >   Arterial flow volume of 1301 mL/m, and the venous flow volume of  1492 mL/m are consistent with a normally functioning dialysis access  fistula.      # Ascites  Hx chronic ascites  Has drainage q4-6 weeks  Last drained -4600mL  ? ascites related to severe TR  Had xzfovhfwiofz-Mrhgtbq-lq growth   CT Abdomen 01/10-Large volume ascites-  US abdomen--Small to moderate volume abdominal/pelvic ascites      # ESRD  Now off CRRT transition to HD

## 2025-02-07 NOTE — CONSULT NOTE ADULT - ATTENDING COMMENTS
seen and examined 01-09-25 @ 1250    12/30/2024 - CABG x 3    on meropenem  on heparin infusion  on ASA 81 mg    extubated yesterday after refusing tracheostomy  now on HFNC (40% @ 40 lpm)  seated in chair  trachea palpable with neck in neutral position  no innominate artery palpable    on Vital 1.5 @ 60 mL/hr via NG feeding tube in right nostril      plats - 82    1/8 coags  -INR - 1.4  -ptt - 30.9 sec    CT abd/pelvis w/o contrast (12/27/2024) - entire stomach is in abdomen. moderate volume of ascites (I personally reviewed the images)      acute respiratory failure  -please reconsult if the patient gets reintubated and requires tracheostomy
seen and examined 02-01-25 @ 1550    12/30/2024 - CABG x 3  1/16/2025 - percutaneous tracheostomy (by me)    on ASA    necrotic skin over right upper back abscess  6 x 4 cm unstagable sacral decubitus ulcer    WBC = 11  pats - 268  HCO3 - 26  Cr - 3.7      infected sebaceous cyst of right upper back  -I&D performed  -f/u cultures  -f/u in my office if the cyst recurs for elective excision    sacral decubitus ulcer with concern for infection  -excisional debridement performed and found to be stage 3 with no evidence of infection  -consult wound care team for further care
ASSESSMENT: Neurology consulted for LLE foot droop and paresthesia in 54M with CAD s/p PCI in April 2024, s/p CABG x3 on 12/30/24, prolonged hospital stay. LLE weakness and paresthesia beginning 2 days ago; exam significant for tenderness upon squeezing the left calf w palpable cord, 1+ dorsiflexion of the L ankle, 2+ L toe extension, and paresthesias in distal LLE.     IMPRESSION:  LLE weakness and paresthesia from knee to toes. Presentation appears to peripheral in etiology given the involvement of multiple nerves (muscular and cutaneous aspects of deep peroneal, cutaneous branches of superficial peroneal, cutaneous branches of posterior tibial nerve). Differentials include compression pathology vs deconditioning. Lower concern for central structural abnormality given exam. Additionally, lower concern for inflammatory pathology or demyelinating pathology given asymmetric presentation.   suspect partial sciatic neuropathy vs peroneal neuritis    RECS:    misc and manage:  [] PM&R consult for left ankle brace
the patient is refusing CV at present  He would require a EDU and being on full AC (Heparin has not been therapeutic) prior to CV

## 2025-02-08 LAB
ALBUMIN SERPL ELPH-MCNC: 2.8 G/DL — LOW (ref 3.3–5)
ALP SERPL-CCNC: 107 U/L — SIGNIFICANT CHANGE UP (ref 40–120)
ALT FLD-CCNC: 10 U/L — SIGNIFICANT CHANGE UP (ref 10–45)
ANION GAP SERPL CALC-SCNC: 13 MMOL/L — SIGNIFICANT CHANGE UP (ref 5–17)
AST SERPL-CCNC: 17 U/L — SIGNIFICANT CHANGE UP (ref 10–40)
BASOPHILS # BLD AUTO: 0.02 K/UL — SIGNIFICANT CHANGE UP (ref 0–0.2)
BASOPHILS NFR BLD AUTO: 0.2 % — SIGNIFICANT CHANGE UP (ref 0–2)
BILIRUB SERPL-MCNC: 0.3 MG/DL — SIGNIFICANT CHANGE UP (ref 0.2–1.2)
BUN SERPL-MCNC: 26 MG/DL — HIGH (ref 7–23)
CHLORIDE SERPL-SCNC: 95 MMOL/L — LOW (ref 96–108)
CO2 SERPL-SCNC: 26 MMOL/L — SIGNIFICANT CHANGE UP (ref 22–31)
CREAT SERPL-MCNC: 4.28 MG/DL — HIGH (ref 0.5–1.3)
EGFR: 16 ML/MIN/1.73M2 — LOW
EGFR: 16 ML/MIN/1.73M2 — LOW
EOSINOPHIL # BLD AUTO: 0.42 K/UL — SIGNIFICANT CHANGE UP (ref 0–0.5)
EOSINOPHIL NFR BLD AUTO: 4.3 % — SIGNIFICANT CHANGE UP (ref 0–6)
GLUCOSE SERPL-MCNC: 114 MG/DL — HIGH (ref 70–99)
HCT VFR BLD CALC: 25.3 % — LOW (ref 39–50)
IMM GRANULOCYTES NFR BLD AUTO: 0.6 % — SIGNIFICANT CHANGE UP (ref 0–0.9)
LYMPHOCYTES # BLD AUTO: 4.2 % — LOW (ref 13–44)
MAGNESIUM SERPL-MCNC: 2.1 MG/DL — SIGNIFICANT CHANGE UP (ref 1.6–2.6)
MCHC RBC-ENTMCNC: 30.4 PG — SIGNIFICANT CHANGE UP (ref 27–34)
MCHC RBC-ENTMCNC: 31.2 G/DL — LOW (ref 32–36)
MCV RBC AUTO: 97.3 FL — SIGNIFICANT CHANGE UP (ref 80–100)
MONOCYTES # BLD AUTO: 1.38 K/UL — HIGH (ref 0–0.9)
MONOCYTES NFR BLD AUTO: 14.3 % — HIGH (ref 2–14)
NEUTROPHILS # BLD AUTO: 7.39 K/UL — SIGNIFICANT CHANGE UP (ref 1.8–7.4)
NEUTROPHILS NFR BLD AUTO: 76.4 % — SIGNIFICANT CHANGE UP (ref 43–77)
NRBC # BLD: 0 /100 WBCS — SIGNIFICANT CHANGE UP (ref 0–0)
NRBC BLD-RTO: 0 /100 WBCS — SIGNIFICANT CHANGE UP (ref 0–0)
PHOSPHATE SERPL-MCNC: 2.2 MG/DL — LOW (ref 2.5–4.5)
PLATELET # BLD AUTO: 200 K/UL — SIGNIFICANT CHANGE UP (ref 150–400)
POTASSIUM SERPL-MCNC: 4 MMOL/L — SIGNIFICANT CHANGE UP (ref 3.5–5.3)
POTASSIUM SERPL-SCNC: 4 MMOL/L — SIGNIFICANT CHANGE UP (ref 3.5–5.3)
PROT SERPL-MCNC: 6.1 G/DL — SIGNIFICANT CHANGE UP (ref 6–8.3)
RBC # BLD: 2.6 M/UL — LOW (ref 4.2–5.8)
RBC # FLD: 22.6 % — HIGH (ref 10.3–14.5)
SODIUM SERPL-SCNC: 134 MMOL/L — LOW (ref 135–145)
WBC # FLD AUTO: 9.68 K/UL — SIGNIFICANT CHANGE UP (ref 3.8–10.5)

## 2025-02-08 PROCEDURE — 93971 EXTREMITY STUDY: CPT | Mod: 26,LT

## 2025-02-08 PROCEDURE — 99291 CRITICAL CARE FIRST HOUR: CPT

## 2025-02-08 PROCEDURE — 76705 ECHO EXAM OF ABDOMEN: CPT | Mod: 26

## 2025-02-08 PROCEDURE — 71045 X-RAY EXAM CHEST 1 VIEW: CPT | Mod: 26

## 2025-02-08 RX ORDER — SODIUM PHOSPHATE,DIBASIC DIHYD
15 POWDER (GRAM) MISCELLANEOUS ONCE
Refills: 0 | Status: COMPLETED | OUTPATIENT
Start: 2025-02-08 | End: 2025-02-08

## 2025-02-08 RX ORDER — GABAPENTIN 400 MG/1
100 CAPSULE ORAL ONCE
Refills: 0 | Status: COMPLETED | OUTPATIENT
Start: 2025-02-08 | End: 2025-02-08

## 2025-02-08 RX ORDER — FENTANYL CITRATE-0.9 % NACL/PF 100MCG/2ML
25 SYRINGE (ML) INTRAVENOUS ONCE
Refills: 0 | Status: DISCONTINUED | OUTPATIENT
Start: 2025-02-08 | End: 2025-02-08

## 2025-02-08 RX ORDER — TRAZODONE HCL 100 MG
25 TABLET ORAL ONCE
Refills: 0 | Status: COMPLETED | OUTPATIENT
Start: 2025-02-08 | End: 2025-02-08

## 2025-02-08 RX ADMIN — Medication 650 MILLIGRAM(S): at 10:15

## 2025-02-08 RX ADMIN — ACETYLCYSTEINE 4 MILLILITER(S): 200 INHALANT RESPIRATORY (INHALATION) at 05:37

## 2025-02-08 RX ADMIN — Medication 1 TABLET(S): at 12:12

## 2025-02-08 RX ADMIN — ACETYLCYSTEINE 4 MILLILITER(S): 200 INHALANT RESPIRATORY (INHALATION) at 17:33

## 2025-02-08 RX ADMIN — MIRTAZAPINE 7.5 MILLIGRAM(S): 30 TABLET, FILM COATED ORAL at 12:13

## 2025-02-08 RX ADMIN — ATORVASTATIN CALCIUM 80 MILLIGRAM(S): 80 TABLET, FILM COATED ORAL at 21:36

## 2025-02-08 RX ADMIN — AMIODARONE HYDROCHLORIDE 200 MILLIGRAM(S): 50 INJECTION, SOLUTION INTRAVENOUS at 06:45

## 2025-02-08 RX ADMIN — METOPROLOL SUCCINATE 12.5 MILLIGRAM(S): 50 TABLET, EXTENDED RELEASE ORAL at 06:46

## 2025-02-08 RX ADMIN — Medication 1 APPLICATION(S): at 06:46

## 2025-02-08 RX ADMIN — ACETYLCYSTEINE 4 MILLILITER(S): 200 INHALANT RESPIRATORY (INHALATION) at 11:37

## 2025-02-08 RX ADMIN — METHOCARBAMOL 500 MILLIGRAM(S): 500 TABLET, FILM COATED ORAL at 06:45

## 2025-02-08 RX ADMIN — LEVALBUTEROL HYDROCHLORIDE 0.63 MILLIGRAM(S): 1.25 SOLUTION RESPIRATORY (INHALATION) at 11:37

## 2025-02-08 RX ADMIN — Medication 81 MILLIGRAM(S): at 12:13

## 2025-02-08 RX ADMIN — Medication 63.75 MILLIMOLE(S): at 02:40

## 2025-02-08 RX ADMIN — Medication 40 MILLIGRAM(S): at 12:12

## 2025-02-08 RX ADMIN — HEPARIN SODIUM 5000 UNIT(S): 1000 INJECTION INTRAVENOUS; SUBCUTANEOUS at 21:37

## 2025-02-08 RX ADMIN — LEVALBUTEROL HYDROCHLORIDE 0.63 MILLIGRAM(S): 1.25 SOLUTION RESPIRATORY (INHALATION) at 23:23

## 2025-02-08 RX ADMIN — GABAPENTIN 100 MILLIGRAM(S): 400 CAPSULE ORAL at 16:03

## 2025-02-08 RX ADMIN — ACETYLCYSTEINE 4 MILLILITER(S): 200 INHALANT RESPIRATORY (INHALATION) at 23:23

## 2025-02-08 RX ADMIN — HEPARIN SODIUM 5000 UNIT(S): 1000 INJECTION INTRAVENOUS; SUBCUTANEOUS at 13:41

## 2025-02-08 RX ADMIN — METOPROLOL SUCCINATE 12.5 MILLIGRAM(S): 50 TABLET, EXTENDED RELEASE ORAL at 18:06

## 2025-02-08 RX ADMIN — Medication 650 MILLIGRAM(S): at 11:00

## 2025-02-08 RX ADMIN — Medication 650 MILLIGRAM(S): at 17:00

## 2025-02-08 RX ADMIN — Medication 25 MICROGRAM(S): at 15:00

## 2025-02-08 RX ADMIN — Medication 5 MILLIGRAM(S): at 21:36

## 2025-02-08 RX ADMIN — Medication 50 MILLIGRAM(S): at 21:37

## 2025-02-08 RX ADMIN — Medication 15 MILLILITER(S): at 06:46

## 2025-02-08 RX ADMIN — METHOCARBAMOL 500 MILLIGRAM(S): 500 TABLET, FILM COATED ORAL at 21:36

## 2025-02-08 RX ADMIN — Medication 25 MICROGRAM(S): at 14:00

## 2025-02-08 RX ADMIN — POLYETHYLENE GLYCOL 3350 17 GRAM(S): 17 POWDER, FOR SOLUTION ORAL at 18:35

## 2025-02-08 RX ADMIN — Medication 500 MILLIGRAM(S): at 12:13

## 2025-02-08 RX ADMIN — LEVALBUTEROL HYDROCHLORIDE 0.63 MILLIGRAM(S): 1.25 SOLUTION RESPIRATORY (INHALATION) at 05:36

## 2025-02-08 RX ADMIN — HEPARIN SODIUM 5000 UNIT(S): 1000 INJECTION INTRAVENOUS; SUBCUTANEOUS at 06:46

## 2025-02-08 RX ADMIN — Medication 25 MILLIGRAM(S): at 02:40

## 2025-02-08 RX ADMIN — Medication 650 MILLIGRAM(S): at 15:44

## 2025-02-08 RX ADMIN — INSULIN LISPRO 2: 100 INJECTION, SOLUTION INTRAVENOUS; SUBCUTANEOUS at 23:56

## 2025-02-08 RX ADMIN — INSULIN LISPRO 4: 100 INJECTION, SOLUTION INTRAVENOUS; SUBCUTANEOUS at 06:45

## 2025-02-08 RX ADMIN — Medication 1 APPLICATION(S): at 21:55

## 2025-02-08 RX ADMIN — METHOCARBAMOL 500 MILLIGRAM(S): 500 TABLET, FILM COATED ORAL at 13:33

## 2025-02-08 NOTE — PROGRESS NOTE ADULT - SUBJECTIVE AND OBJECTIVE BOX
Patient seen and examined at the bedside.    Remains critically ill on continuous ICU monitoring.      Brief Summary:  53 yo M with multiple medical problems including CAD s/p PCI in 2024 s/p CABG x 3 on 24: Intubated for hypoxia & left lung white out s/p awake bronch with removal of copious mucopurulent secretions  : s/p IABP insertion, sepsis/septic shock due to E coli   ESBL pneumonia, collapsed Left Lung, s/p multiple bronch    tracheostomy tube placement    VRE E. Faecium Bcx   +HSV PCR BAL   tissue cx from back abscess - Serratia   - - intermittent bradycardia/junctional episodes with hypotension  2/3: off , off theophylline. iHD 1L UF  : bronch for Left lung collapse wound vac, 2decho w/ moderate pericardial effusion - similar as before, US abd: small - moderate ascites - monitor at this time.  Wound vac placed for sacral wounds  : iHD-1L UF  : bronch today off positive pressure. : MRSA and serratia from cyst on the back. continue levaquin + Vanc x 5 days      24 Hour events:      Objective:  Vital Signs Last 24 Hrs  T(C): 37.2 (2025 04:00), Max: 37.2 (2025 20:00)  T(F): 98.9 (2025 04:00), Max: 99 (2025 20:00)  HR: 74 (2025 07:00) (61 - 108)  BP: 148/79 (2025 07:00) (109/60 - 154/75)  BP(mean): 107 (2025 07:00) (80 - 107)  RR: 19 (2025 07:00) (5 - 28)  SpO2: 94% (2025 07:00) (86% - 100%)    Parameters below as of 2025 06:05  Patient On (Oxygen Delivery Method): HFTC        Mode: CPAP with PS  FiO2: 40  PEEP: 8  PS: 12  MAP: 12  PIP: 13              Physical Exam:   General: Alert and interactive   Neurology: Oriented, following commands  Respiratory: Clear bilaterally   CV: Afib  Abdominal: Soft, Nontender  Extremities: Warm, well-perfused  -------------------------------------------------------------------------------------------------------------------------------    Labs:                        7.9    9.68  )-----------( 200      ( 2025 00:39 )             25.3     02-08    134[L]  |  95[L]  |  26[H]  ----------------------------<  114[H]  4.0   |  26  |  4.28[H]    Ca    8.8      2025 00:39  Phos  2.2     02-08  Mg     2.1     02-08    TPro  6.1  /  Alb  2.8[L]  /  TBili  0.3  /  DBili  x   /  AST  17  /  ALT  10  /  AlkPhos  107  0208    LIVER FUNCTIONS - ( 2025 00:39 )  Alb: 2.8 g/dL / Pro: 6.1 g/dL / ALK PHOS: 107 U/L / ALT: 10 U/L / AST: 17 U/L / GGT: x           ------------------------------------------------------------------------------------------------------------------------------  Assessment:  53 yo M s/p CABG x 3 on 24    Postop acute respiratory failure  Acute blood loss anemia  Ascites    ESRD on iHD   E coli bacteremia  pneumonia  VRE E. Faecium   HSV infection  Tracheomalacia     Plan:  ***Neuro***  Postoperative acute pain control with Robaxin and Tylenol  Trazodone and Melatonin nightly  On Remeron  PT ongoing    ***Cardiovascular***  s/p CABG x 3  A fib s/p cardioversion  Cannot be on AC due to moderate pericardial effusion  Tolerating betablocker , PO Amio for Afib  TTE  LVEF 60%, mod RA pericardial effusion   ASA / Statin daily    ***Pulmonary***  Postoperative acute respiratory failure  Tracheostomy    Requiring frequent bronchs for left lobe collpase  Mucomyst, duo nebs, HTS to help mobilize secretions  Left lung PNA, + e coli ESBL - cleared but now with VRE   Tolerates trach collar with high flow, keep on a vent at night  Bronchoscopy by IP on  - no concerns for bronchomalacia  Repeat bronch w/ IP on  - to assess bronchiectasia vs malacia    ***GI***  On Tube feeds at goal  Protonix for stress ulcer prophylaxis.   Bowel regimen  Ascites: Last paracentesis , monitor repeat US w/ mild-moderate     ***Renal***  ESRD - tolerating iHD (MWF)   Nephro-Lisette for renal support  iHD  (MWF)    ***ID***  Monitor cultures   BAL - commensal manjit    VRE BAL - Linezolid    Acyclovir for + HSV in BAL   serratia on tissue cx and h/o ESBL PNA - Meropenem until : MRSA and serratia from cyst on the back. continue levaquin + Vanc x 5 days    ***Endocrine***  Hyperglycemia - Insulin sliding scale    ***Hematology***  Acute blood loss anemia  CBC, coags  Continue Epogen.  Heparin SQ for DVT  No AC s/p cardioversion. not on AC due to moderate pericardial effusion    Wound:  s/p sacral debridement on  . wound vac placed on  and changed three times a week        Patient requires continuous monitoring with bedside rhythm monitoring, pulse oximetry monitoring, and continuous central venous and arterial pressure monitoring; and intermittent blood gas analysis. Care plan discussed with the ICU care team.   Patient remains critical, at risk for life threatening decompensation.    I have spent 30 minutes providing critical care management to this patient.    By signing my name below, I, Angelo Barbosa, attest that this documentation has been prepared under the direction and in the presence of Jen Sierra MD  Electronically signed: Angelo Barbosa, 25 @ 08:10    I, Jen Sierra MD, personally performed the services described in this documentation. all medical record entries made by the scribe were at my direction and in my presence. I have reviewed the chart and agree that the record reflects my personal performance and is accurate and complete  Electronically signed: Jen Sierra MD Patient seen and examined at the bedside.    Remains critically ill on continuous ICU monitoring.      Brief Summary:  55 yo M with multiple medical problems including CAD s/p PCI in 2024 s/p CABG x 3 on 24: Intubated for hypoxia & left lung white out s/p awake bronch with removal of copious mucopurulent secretions  : s/p IABP insertion, sepsis/septic shock due to E coli   ESBL pneumonia, collapsed Left Lung, s/p multiple bronch    tracheostomy tube placement    VRE E. Faecium Bcx   +HSV PCR BAL   tissue cx from back abscess - Serratia   - - intermittent bradycardia/junctional episodes with hypotension  2/3: off , off theophylline. iHD 1L UF  : bronch for Left lung collapse wound vac, 2decho w/ moderate pericardial effusion - similar as before, US abd: small - moderate ascites - monitor at this time.  Wound vac placed for sacral wounds  : iHD-1L UF  : bronch today off positive pressure. : MRSA and serratia from cyst on the back. Plan for Levaquin + Vanc x 5 days      24 Hour events:  New foot drop - Levaquin stopped for tendonitis concern.  Tolerating short trach collar trials.  Rested on vent overnight.  Ambulated.      Objective:  Vital Signs Last 24 Hrs  T(C): 37.2 (2025 04:00), Max: 37.2 (2025 20:00)  T(F): 98.9 (2025 04:00), Max: 99 (2025 20:00)  HR: 74 (2025 07:00) (61 - 108)  BP: 148/79 (2025 07:00) (109/60 - 154/75)  BP(mean): 107 (2025 07:00) (80 - 107)  RR: 19 (2025 07:00) (5 - 28)  SpO2: 94% (2025 07:00) (86% - 100%)    Parameters below as of 2025 06:05  Patient On (Oxygen Delivery Method): [facility]              Physical Exam:   General: Alert and interactive, sitting in chair.   Neurology: Oriented, following commands  Respiratory: Clear bilaterally, Trach site ok.  CV: A fib  Abdominal: Soft, Nontender  Extremities: Warm, well-perfused    -------------------------------------------------------------------------------------------------------------------------------    Labs:                        7.9    9.68  )-----------( 200      ( 2025 00:39 )             25.3     02-08    134[L]  |  95[L]  |  26[H]  ----------------------------<  114[H]  4.0   |  26  |  4.28[H]    Ca    8.8      2025 00:39  Phos  2.2     02-08  Mg     2.1     02-08    TPro  6.1  /  Alb  2.8[L]  /  TBili  0.3  /  DBili  x   /  AST  17  /  ALT  10  /  AlkPhos  107  02-08    LIVER FUNCTIONS - ( 2025 00:39 )  Alb: 2.8 g/dL / Pro: 6.1 g/dL / ALK PHOS: 107 U/L / ALT: 10 U/L / AST: 17 U/L / GGT: x           ------------------------------------------------------------------------------------------------------------------------------  Assessment:  55 yo M s/p CABG x 3 on 24    Postop acute respiratory failure  Acute blood loss anemia  ESRD on iHD   E coli bacteremia  pneumonia  VRE E. Faecium   Bronchomalacia     Plan:  ***Neuro***  Postoperative acute pain control with Robaxin and Tylenol  Trazodone, Remeron, and Melatonin nightly  PT ongoing    ***Cardiovascular***  s/p CABG x 3  A fib - Tolerating beta blocker, PO Amio for Afib  ASA / Statin daily    ***Pulmonary***  Postoperative acute respiratory failure  Tracheostomy    Requiring frequent bronchs for left lobe collpase  Mucomyst, duo nebs, HTS to help mobilize secretions  Left lung PNA, + e coli ESBL - cleared but now with VRE   Tolerates trach collar with high flow, will rest on vent overnight.  Bronchoscopy by IP on  - no intervention planned for bronchomalacia    ***GI***  Tolerating Tube feeds at goal  Protonix for stress ulcer prophylaxis.   Bowel regimen    ***Renal***  ESRD - tolerating iHD   Nephro-Lisette for renal support  iHD  ([facility])    ***ID***  Monitor cultures   BAL - commensal manjit    VRE BAL - Linezolid    Acyclovir for + HSV in BAL   Serratia on tissue cx and h/o ESBL PNA - Meropenem until : MRSA and serratia from cyst on the back. Levaquin + Vanco, but now off Levaquin.    ***Endocrine***  Hyperglycemia - Insulin sliding scale    ***Hematology***  Acute blood loss anemia - no transfusion indication currently.  CBC, coags  Continue Epogen.  Heparin SQ for DVT  No AC s/p cardioversion. Not on AC due to moderate pericardial effusion    Wound:  s/p sacral debridement on  . wound vac placed on  and changed three times a week        Care plan discussed with the ICU care team.   Patient remains critical, at risk for life threatening decompensation.    I have spent 38 minutes providing critical care management to this patient.    By signing my name below, I, Angelo Barbosa, attest that this documentation has been prepared under the direction and in the presence of Jen Sierra MD  Electronically signed: Angelo Barbosa, 25 @ 08:10    I, Jen Sierra MD, personally performed the services described in this documentation. All medical record entries made by the scribe were at my direction and in my presence. I have reviewed the chart and agree that the record reflects my personal performance and is accurate and complete  Electronically signed: Jen Sierra MD

## 2025-02-08 NOTE — PROGRESS NOTE ADULT - ASSESSMENT
54M with PMH of HTN, HLD, ESRD on HD MWF via LUE AVF, ascites (requiring repeat paracenteses q4-6wks), NICM with moderate LV dysfunction w/ EF 35-40%() and CAD s/p atherectomy + SALLIE to D1(2024) presents to [facility] transferred from [facility]. Patient initially presented to [facility] ED with shortness of breath. Of note he was recently admitted from - for acute cholecystitis s/p cholecystectomy. In [facility] ED, pt was hypoxic to 86% on RA, trop 1.7K, EKG no new changes, CXR fluid overload. Admitted for AHRF 2/2 fluid overload. Pt received HD on , ,  and . DAPT was resumed, TTE showed improvement in LVEF to 40-45%. Stress test was abnormal with 1mm inferior STD, reversible ischemia in the inferior wall and fixed defects in the lateral, anterior and apical walls with post stress EF of 24%.     Pt was then transferred to [facility] for cardiac cath which showed pLAD 70% ISR, mLCx 70%, D1 severe dz. Patient now transferred to [facility] CTS Dr. Rivers for CABG eval.# CAD s/p NSTEMI  Hx CAD s/p PCI LAD   Presented with ADHF elevated troponins  Positive NST1-Cath- pLAD 70% ISR, mLCx 70%, D1 severe dz.   TTE EF 35% Severe TRWas on Plavix-p2y12- 321 been off Plavix since -POD#1-C3L free LIMA-LAD; SVG-Diag; ESD-tpkkUY-Anpandruj on  Sinus rhythm,Amio for AF prophylaxis  -OOB off  Sinus rhythm   -POD#3-On /pressors-EF 25-30% RV failure with transaminitis-Sinus Gvutso16/03-POD#4-Was intubated for hypoxia-gradual hypotension on /pressors had IABP inserted this morning  -POD#5-s/p IABP insertion, sepsis/septic shock due to GNR/ECOLI HD cath placed   Bronched yesterday 1/3 - minimal secretions with rust-tinged sputum   ESBL-E Coli bacteremia on meropenam    - POD#7 Atrial Fib ,remains intubated weaning IABP 1:3,off pressors on CRRT  -IABP removed.Remains on vent-Alex 10ppm,off Levo- CVP 10 / MAP 71 / Hct 25.6% / Lactate 1.7-Atrial Fibrillation  -Intubated on Alex 10ppm, gtt, BP better-AF~~90's on Amio-had bronch still febrile Tmax 81412/-Extubated awake alert on HFNC AF,on Dobutrex-CRRT,Cultures no growth    01/10-OOB on BiPAP Sinus Rhythm on -SR/ MAP 57/ CVP 10/  Hct 26.7 / Lact 1.4  -s/p EDU/DCCV-Sinus rhythm on -SR / MAP 52 / CVP 12/ Hct 25.8 / Lact 0.7-had bronch with BAL Left lung  -Sinus rhythm on -for repeat Bronch-SR / MAP 67 / CVP 11 / Hct 27.4% / Lact 0.8  -s/p Trach on  TTE EF 55%-SR / CVP 2 / MAP 63 / Hct 25.9% / Lactate 0.9  -Awake on Trach collar off  c/o buttock pain had bronch yesterday-BAL-Sinus rhythm  -Sinus rhythm on vent at night-BP stable may need to increase hydrallazine 25 mg q8  -TTE EF 60% still needs Vent support at night Bradycardic trial of Bryce now back on   -Awake alert Sinus rhythm BP elevated  remains on ,Nocturnal vent support  resume Hydralazine 25mg q8  -Reverted to AF rvr  -s/p BRonc/BAL debridement sacral decub  -Awake noted AF RVR no further bradyarrhythmias-Start BBlockers      # Acute on Chronic Systolic Heart Failure now improved  EF-35% ,severe TR  Had HD post op  GDMT post op  LVEF improved post bypass but now at 23-30%-BiV dysfunction on  now off pressors  -TTE EF 40%,trace effusion  ECG c/w pericarditis-was on colchicine   01/15-TTE EF 55%  -TTE EF 60%   -Normal LV systolic function Moderate pericardial effusion  -On TC-HF and Vent support at nights   -        Vascular evaluated  AVGraft /?steal causing RV failure-'' Very low suspicion of steal Sd and AVF causing current clinical state. ''   VA Duplex Hemodialysis Access, Left (25 @ 13:35) >   Arterial flow volume of 1301 mL/m, and the venous flow volume of  1492 mL/m are consistent with a normally functioning dialysis access  fistula.      # Ascites  Hx chronic ascites  Has drainage q4-6 weeks  Last drained -4600mL  ? ascites related to severe TR  Had vlrjkqawhgwg-Xngtkkb-xm growth   CT Abdomen 01/10-Large volume ascites-  US abdomen--Small to moderate volume abdominal/pelvic ascites      # ESRD  Now off CRRT transition to HD

## 2025-02-08 NOTE — PROGRESS NOTE ADULT - SUBJECTIVE AND OBJECTIVE BOX
[facility]    Dr. Nica Styles     Office [phone] (9 am to 5 pm)  Service: [phone] (5pm to 9am)  Also Available on TEAMS      RENAL PROGRESS NOTE: DATE OF SERVICE 02-08-25 @ 10:43    Patient is a 54y old  Male who presents with a chief complaint of CAD s/p CABG (08 Feb 2025 06:54)      Patient seen and examined at bedside. No chest pain/sob    VITALS:  T(F): 97.7 (02-08-25 @ 08:00), Max: 99 (02-07-25 @ 20:00)  HR: 61 (02-08-25 @ 09:18)  BP: 102/52 (02-08-25 @ 09:00)  RR: 20 (02-08-25 @ 09:00)  SpO2: 98% (02-08-25 @ 09:18)  Wt(kg): --    02-07 @ 07:01  -  02-08 @ 07:00  --------------------------------------------------------  IN: 2150 mL / OUT: 2000 mL / NET: 150 mL    02-08 @ 07:01  -  02-08 @ 10:43  --------------------------------------------------------  IN: 130 mL / OUT: 0 mL / NET: 130 mL          PHYSICAL EXAM:  Constitutional: NAD  Neck: No JVD  Respiratory: CTAB, no wheezes, rales or rhonchi  Cardiovascular: S1, S2, RRR  Gastrointestinal: BS+, soft, NT/ND  Extremities: No peripheral edema    Hospital Medications:   MEDICATIONS  (STANDING):  acetylcysteine 10%  Inhalation 4 milliLiter(s) Inhalation every 6 hours  aMIOdarone    Tablet 200 milliGRAM(s) Oral daily  ascorbic acid 500 milliGRAM(s) Oral daily  aspirin  chewable 81 milliGRAM(s) Oral daily  atorvastatin 80 milliGRAM(s) Oral at bedtime  chlorhexidine 0.12% Liquid 15 milliLiter(s) Oral Mucosa every 12 hours  chlorhexidine 2% Cloths 1 Application(s) Topical daily  chlorhexidine 4% Liquid 1 Application(s) Topical <User Schedule>  dextrose 50% Injectable 50 milliLiter(s) IV Push every 15 minutes  epoetin ignacia (EPOGEN) Injectable 71614 Unit(s) IV Push <User Schedule>  heparin   Injectable 5000 Unit(s) SubCutaneous every 8 hours  insulin lispro (ADMELOG) corrective regimen sliding scale   SubCutaneous every 6 hours  levalbuterol Inhalation 0.63 milliGRAM(s) Inhalation every 8 hours  melatonin 5 milliGRAM(s) Oral at bedtime  methocarbamol 500 milliGRAM(s) Oral every 8 hours  metoprolol tartrate 12.5 milliGRAM(s) Oral every 12 hours  mirtazapine 7.5 milliGRAM(s) Oral daily  Nephro-elicia 1 Tablet(s) Oral daily  pantoprazole  Injectable 40 milliGRAM(s) IV Push daily  polyethylene glycol 3350 17 Gram(s) Oral two times a day  sodium chloride 0.65% Nasal 1 Spray(s) Both Nostrils every 12 hours  sodium chloride 0.9%. 1000 milliLiter(s) (10 mL/Hr) IV Continuous <Continuous>  traZODone 50 milliGRAM(s) Oral at bedtime  vancomycin  IVPB 1000 milliGRAM(s) IV Intermittent every 24 hours      LABS:  02-08    134[L]  |  95[L]  |  26[H]  ----------------------------<  114[H]  4.0   |  26  |  4.28[H]    Ca    8.8      08 Feb 2025 00:39  Phos  2.2     02-08  Mg     2.1     02-08    TPro  6.1  /  Alb  2.8[L]  /  TBili  0.3  /  DBili      /  AST  17  /  ALT  10  /  AlkPhos  107  02-08    Creatinine Trend: 4.28 <--, 4.37 <--, 4.45 <--, 5.10 <--, 4.58 <--, 4.12 <--, 5.74 <--, 5.38 <--, 4.78 <--    Albumin: 2.8 g/dL (02-08 @ 00:39)  Phosphorus: 2.2 mg/dL (02-08 @ 00:39)                              7.9    9.68  )-----------( 200      ( 08 Feb 2025 00:39 )             25.3     Urine Studies:  Urinalysis - [02-08-25 @ 00:39]      Color  / Appearance  / SG  / pH       Gluc 114 / Ketone   / Bili  / Urobili        Blood  / Protein  / Leuk Est  / Nitrite       RBC  / WBC  / Hyaline  / Gran  / Sq Epi  / Non Sq Epi  / Bacteria       Iron 29, TIBC 143, %sat 20      [12-19-24 @ 05:00]  Ferritin 904      [12-19-24 @ 05:00]  TSH 4.75      [12-24-24 @ 06:50]        RADIOLOGY & ADDITIONAL STUDIES:

## 2025-02-08 NOTE — PROGRESS NOTE ADULT - ASSESSMENT
54M with PMH of HTN, HLD, ESRD on HD MWF via LUE AVF, ascites (requiring repeat paracenteses q4-6wks), NICM with moderate LV dysfunction w/ EF 35-40%(2023) and CAD s/p atherectomy + SALLIE to D1(4/11/2024) presents to [facility] transferred from [facility] for CABG eval  Nephro on [facility] for HD    ESRD on HD   Regular schedule MWF   Access LUE AVF  Center [facility]  Nephrologist Dr. Bailey  S/p HD on 01/29, 2/3 and 02/05  S/p HD on 02/07 cw MWF   Keep MAP>65  Renal Diet  Consent in physical chart    Hyper/Hypokalemia  Monitor K, will change dialysate fluid as needed    HTN  bp fluctuating; acceptable  monitor bp     CKD MBD  Can send a PTH  Ca and Phos daily    Anemia  CRISTIANE with HD  Transfuse if hgb <7    CAD with multivessel disease  s/p CABG 12/30  CTICU following    Hypoxic Resp failure requiring Trach  Per surgery  S/p bronchoscopy, pulm following

## 2025-02-08 NOTE — PROGRESS NOTE ADULT - SUBJECTIVE AND OBJECTIVE BOX
MR#[id number]  PATIENT NAME:RAAD CANO    DATE OF SERVICE: 02-08-25 @ 06:55  Patient was seen and examined by Solo Quick MD on    02-08-25 @ 06:55 .  Interim events noted.Consultant notes ,Labs,Telemetry reviewed by me       HOSPITAL COURSE: HPI:  54M with PMH of HTN, HLD, ESRD on HD MWF via LUE AVF, ascites (requiring repeat paracenteses q4-6wks), NICM with moderate LV dysfunction w/ EF 35-40%(2023) and CAD s/p atherectomy + SALLIE to D1(4/11/2024) presents to [facility] transferred from [facility]. Patient initially presented to [facility] ED with shortness of breath. Of note he was recently admitted from 09/27-12/02 for acute cholecystitis s/p cholecystectomy. In [facility] ED, pt was hypoxic to 86% on RA, trop 1.7K, EKG no new changes, CXR fluid overload. Admitted for AHRF 2/2 fluid overload. Pt received HD on 12/18, 12/19, 12/20 and 12/22. DAPT was resumed, TTE showed improvement in LVEF to 40-45%. Stress test was abnormal with 1mm inferior STD, reversible ischemia in the inferior wall and fixed defects in the lateral, anterior and apical walls with post stress EF of 24%. Pt was then transferred to [facility] for cardiac cath which showed pLAD 70% ISR, mLCx 70%, D1 severe dz. Patient now transferred to [facility] CTS Dr. Rivers for CABG eval. Patient denies any current SOB or chest pain on admission. Otherwise denies headaches, abdominal pain, urinary or bowel changes, fevers, chills, N/V/D, sick contacts.  (24 Dec 2024 05:07)      INTERIM EVENTS:Patient seen at bedside ,interim events noted.    12/23 Cardiac cath  pLAD 70% ISR, mLCx 70%, D1 severe dz.   12/31-POD#1-C3L free LIMA-LAD; SVG-Diag; SVG-leftPL Awake OOB extubated Sinus rhythm on Dobutamine gtt  02/04-Awake alert on T collar-TC during the day and PC: 12/10//15//50% at night-interrmittent rapid AF noted  02/07-Awake had Bronch yesterday-  02/08-Seen by [facility] for LLE weakness appears peripheral neuropathy        PMH -reviewed admission note, no change since admission  HEART FAILURE: Acute[ ]Chronic[ ] Systolic[ ] Diastolic[ ] Combined Systolic and Diastolic[ ]  CAD[ ] CABG[ ] PCI[ ]  DEVICES[ ] PPM[ ] ICD[ ] ILR[ ]  ATRIAL FIBRILLATION[ ] Paroxysmal[ ] Permanent[ ] CHADS2-[  ]  GHASSAN[ ] CKD1[ ] CKD2[ ] CKD3[ ] CKD4[ ] ESRD[ ]  COPD[ ] HTN[ ]   DM[ ] Type1[ ] Type 2[ ]   CVA[ ] Paresis[ ]    AMBULATION: Assisted[ ] Cane/walker[ ] Independent[ ]    MEDICATIONS  (STANDING):  acetylcysteine 10%  Inhalation 4 milliLiter(s) Inhalation every 6 hours  aMIOdarone    Tablet 200 milliGRAM(s) Oral daily  ascorbic acid 500 milliGRAM(s) Oral daily  aspirin  chewable 81 milliGRAM(s) Oral daily  atorvastatin 80 milliGRAM(s) Oral at bedtime  chlorhexidine 0.12% Liquid 15 milliLiter(s) Oral Mucosa every 12 hours  chlorhexidine 2% Cloths 1 Application(s) Topical daily  chlorhexidine 4% Liquid 1 Application(s) Topical <User Schedule>  dextrose 50% Injectable 50 milliLiter(s) IV Push every 15 minutes  epoetin ignacia (EPOGEN) Injectable 99346 Unit(s) IV Push <User Schedule>  heparin   Injectable 5000 Unit(s) SubCutaneous every 8 hours  insulin lispro (ADMELOG) corrective regimen sliding scale   SubCutaneous every 6 hours  levalbuterol Inhalation 0.63 milliGRAM(s) Inhalation every 8 hours  levoFLOXacin IVPB 500 milliGRAM(s) IV Intermittent every 48 hours  melatonin 5 milliGRAM(s) Oral at bedtime  methocarbamol 500 milliGRAM(s) Oral every 8 hours  metoprolol tartrate 12.5 milliGRAM(s) Oral every 12 hours  mirtazapine 7.5 milliGRAM(s) Oral daily  Nephro-elicia 1 Tablet(s) Oral daily  pantoprazole  Injectable 40 milliGRAM(s) IV Push daily  polyethylene glycol 3350 17 Gram(s) Oral two times a day  sodium chloride 0.65% Nasal 1 Spray(s) Both Nostrils every 12 hours  sodium chloride 0.9%. 1000 milliLiter(s) (10 mL/Hr) IV Continuous <Continuous>  traZODone 50 milliGRAM(s) Oral at bedtime  vancomycin  IVPB 1000 milliGRAM(s) IV Intermittent every 24 hours    MEDICATIONS  (PRN):  acetaminophen     Tablet .. 650 milliGRAM(s) Oral every 6 hours PRN Mild Pain (1 - 3)  lidocaine/prilocaine Cream 1 Application(s) Topical daily PRN pre-HD  sodium chloride 0.9% lock flush 10 milliLiter(s) IV Push every 1 hour PRN Pre/post blood products, medications, blood draw, and to maintain line patency            REVIEW OF SYSTEMS:  Constitutional: [ ] fever, [ ]weight loss,  [ ]fatigue [ ]weight gain  Eyes: [ ] visual changes  Respiratory: [ ]shortness of breath;  [ ] cough, [ ]wheezing, [ ]chills, [ ]hemoptysis  Cardiovascular: [ ] chest pain, [ ]palpitations, [ ]dizziness,  [ ]leg swelling[ ]orthopnea[ ]PND  Gastrointestinal: [ ] abdominal pain, [ ]nausea, [ ]vomiting,  [ ]diarrhea [ ]Constipation [ ]Melena  Genitourinary: [ ] dysuria, [ ] hematuria [ ]Vigil  Neurologic: [ ] headaches [ ] tremors[ ]weakness [ ]Paralysis Right[ ] Left[ ]  Skin: [ ] itching, [ ]burning, [ ] rashes  Endocrine: [ ] heat or cold intolerance  Musculoskeletal: [ ] joint pain or swelling; [ ] muscle, back, or extremity pain  Psychiatric: [ ] depression, [ ]anxiety, [ ]mood swings, or [ ]difficulty sleeping  Hematologic: [ ] easy bruising, [ ] bleeding gums    [ ] All remaining systems negative except as per above.   [ ]Unable to obtain.  [x] No change in ROS since admission      Vital Signs Last 24 Hrs  T(C): 37 (08 Feb 2025 00:00), Max: 37.2 (07 Feb 2025 08:00)  T(F): 98.6 (08 Feb 2025 00:00), Max: 99 (07 Feb 2025 20:00)  HR: 75 (08 Feb 2025 06:05) (61 - 108)  BP: 134/77 (08 Feb 2025 04:00) (109/60 - 154/75)  BP(mean): 101 (08 Feb 2025 04:00) (80 - 107)  RR: 17 (08 Feb 2025 04:00) (5 - 28)  SpO2: 94% (08 Feb 2025 06:05) (86% - 100%)    Parameters below as of 08 Feb 2025 06:05  Patient On (Oxygen Delivery Method): [facility]      I&O's Summary    06 Feb 2025 07:01  -  07 Feb 2025 07:00  --------------------------------------------------------  IN: 1640 mL / OUT: 0 mL / NET: 1640 mL    07 Feb 2025 07:01  -  08 Feb 2025 06:55  --------------------------------------------------------  IN: 1990 mL / OUT: 2000 mL / NET: -10 mL        PHYSICAL EXAM:  General: No acute distress BMI-28  HEENT: EOMI, PERRL  Neck: Supple, [ ] JVD  Lungs: Equal air entry bilaterally; [ ] rales [ ] wheezing [ ] rhonchi  Heart: Regular rate and rhythm; [x ] murmur   2/6 [ x] systolic [ ] diastolic [ ] radiation[ ] rubs [ ]  gallops  Abdomen: Nontender, bowel sounds present  Extremities: No clubbing, cyanosis, [ ] edema [ ]Pulses  equal and intact  Nervous system:  Alert & Oriented X3, no focal deficits  Psychiatric: Normal affect  Skin: No rashes or lesions    LABS:  02-08    134[L]  |  95[L]  |  26[H]  ----------------------------<  114[H]  4.0   |  26  |  4.28[H]    Ca    8.8      08 Feb 2025 00:39  Phos  2.2     02-08  Mg     2.1     02-08    TPro  6.1  /  Alb  2.8[L]  /  TBili  0.3  /  DBili  x   /  AST  17  /  ALT  10  /  AlkPhos  107  02-08    Creatinine Trend: 4.28<--, 4.37<--, 4.45<--, 5.10<--, 4.58<--, 4.12<--                        7.9    9.68  )-----------( 200      ( 08 Feb 2025 00:39 )             25.3          Xray Chest 1 View- PORTABLE-Urgent (Xray Chest 1 View- PORTABLE-Urgent .) (02.06.25 @ 14:42) >  IMPRESSION:    Tracheostomy noted.  Enteric tube visualized coursing below the diaphragm with tip terminating  out of view.  Bilateral diffuse perihilar opacities which may represent pulmonary edema  or pneumonia.  No pneumothorax detected.     12 Lead ECG (02.04.25 @ 16:00) >   aflutter wiht variable block  LEFT AXIS DEVIATION  INCOMPLETE RIGHT BUNDLE BRANCH BLOCK  ANTERIOR INFARCT (CITED ON OR BEFORE 04-FEB-2025)  ABNORMAL ECG       TTE Limited W or WO Ultrasound Enhancing Agent (02.03.25 @ 15:31) >  CONCLUSIONS:      1.Left ventricular cavity is normal in size. Left ventricular systolic function is normal.   2. Severely enlarged right ventricular cavity size and reduced right ventricular systolic function.   3. Moderate pericardial effusion noted adjacent to the right atrium and small pericardial effusion noted adjacent to the anterior right ventricle with no echocardiographic evidence of tamponade physiology: evidence of pericardial constriction with septal bounce.   4. The inferior vena cava is dilated measuring 2.30 cm in diameter, (dilated >2.1cm) with abnormal inspiratory collapse (abnormal <50%) consistent with elevated right atrial pressure (~15, range 10-20mmHg).

## 2025-02-09 LAB
ALBUMIN SERPL ELPH-MCNC: 2.9 G/DL — LOW (ref 3.3–5)
ALP SERPL-CCNC: 99 U/L — SIGNIFICANT CHANGE UP (ref 40–120)
ALT FLD-CCNC: 10 U/L — SIGNIFICANT CHANGE UP (ref 10–45)
ANION GAP SERPL CALC-SCNC: 16 MMOL/L — SIGNIFICANT CHANGE UP (ref 5–17)
AST SERPL-CCNC: 18 U/L — SIGNIFICANT CHANGE UP (ref 10–40)
BASOPHILS # BLD AUTO: 0.03 K/UL — SIGNIFICANT CHANGE UP (ref 0–0.2)
BASOPHILS NFR BLD AUTO: 0.3 % — SIGNIFICANT CHANGE UP (ref 0–2)
BILIRUB SERPL-MCNC: 0.4 MG/DL — SIGNIFICANT CHANGE UP (ref 0.2–1.2)
BUN SERPL-MCNC: 32 MG/DL — HIGH (ref 7–23)
CALCIUM SERPL-MCNC: 8.7 MG/DL — SIGNIFICANT CHANGE UP (ref 8.4–10.5)
CHLORIDE SERPL-SCNC: 94 MMOL/L — LOW (ref 96–108)
CO2 SERPL-SCNC: 25 MMOL/L — SIGNIFICANT CHANGE UP (ref 22–31)
CREAT SERPL-MCNC: 5.13 MG/DL — HIGH (ref 0.5–1.3)
EGFR: 12 ML/MIN/1.73M2 — LOW
EGFR: 12 ML/MIN/1.73M2 — LOW
EOSINOPHIL # BLD AUTO: 0.62 K/UL — HIGH (ref 0–0.5)
EOSINOPHIL NFR BLD AUTO: 7.1 % — HIGH (ref 0–6)
GLUCOSE SERPL-MCNC: 170 MG/DL — HIGH (ref 70–99)
HCT VFR BLD CALC: 25.2 % — LOW (ref 39–50)
HGB BLD-MCNC: 8 G/DL — LOW (ref 13–17)
IMM GRANULOCYTES NFR BLD AUTO: 0.6 % — SIGNIFICANT CHANGE UP (ref 0–0.9)
LYMPHOCYTES # BLD AUTO: 0.52 K/UL — LOW (ref 1–3.3)
LYMPHOCYTES # BLD AUTO: 6 % — LOW (ref 13–44)
MAGNESIUM SERPL-MCNC: 2.2 MG/DL — SIGNIFICANT CHANGE UP (ref 1.6–2.6)
MCHC RBC-ENTMCNC: 30.9 PG — SIGNIFICANT CHANGE UP (ref 27–34)
MCHC RBC-ENTMCNC: 31.7 G/DL — LOW (ref 32–36)
MCV RBC AUTO: 97.3 FL — SIGNIFICANT CHANGE UP (ref 80–100)
MONOCYTES # BLD AUTO: 1.21 K/UL — HIGH (ref 0–0.9)
MONOCYTES NFR BLD AUTO: 13.9 % — SIGNIFICANT CHANGE UP (ref 2–14)
NEUTROPHILS # BLD AUTO: 6.28 K/UL — SIGNIFICANT CHANGE UP (ref 1.8–7.4)
NEUTROPHILS NFR BLD AUTO: 72.1 % — SIGNIFICANT CHANGE UP (ref 43–77)
NRBC # BLD: 0 /100 WBCS — SIGNIFICANT CHANGE UP (ref 0–0)
NRBC BLD-RTO: 0 /100 WBCS — SIGNIFICANT CHANGE UP (ref 0–0)
PLATELET # BLD AUTO: 198 K/UL — SIGNIFICANT CHANGE UP (ref 150–400)
POTASSIUM SERPL-MCNC: 4.3 MMOL/L — SIGNIFICANT CHANGE UP (ref 3.5–5.3)
POTASSIUM SERPL-SCNC: 4.3 MMOL/L — SIGNIFICANT CHANGE UP (ref 3.5–5.3)
PROT SERPL-MCNC: 6.3 G/DL — SIGNIFICANT CHANGE UP (ref 6–8.3)
RBC # BLD: 2.59 M/UL — LOW (ref 4.2–5.8)
RBC # FLD: 22.9 % — HIGH (ref 10.3–14.5)
SODIUM SERPL-SCNC: 135 MMOL/L — SIGNIFICANT CHANGE UP (ref 135–145)
VANCOMYCIN TROUGH SERPL-MCNC: 20.9 UG/ML — HIGH (ref 10–20)
WBC # BLD: 8.71 K/UL — SIGNIFICANT CHANGE UP (ref 3.8–10.5)
WBC # FLD AUTO: 8.71 K/UL — SIGNIFICANT CHANGE UP (ref 3.8–10.5)

## 2025-02-09 PROCEDURE — 93308 TTE F-UP OR LMTD: CPT | Mod: 26

## 2025-02-09 PROCEDURE — 99291 CRITICAL CARE FIRST HOUR: CPT

## 2025-02-09 PROCEDURE — 71045 X-RAY EXAM CHEST 1 VIEW: CPT | Mod: 26

## 2025-02-09 RX ORDER — OXYCODONE HYDROCHLORIDE 30 MG/1
5 TABLET ORAL ONCE
Refills: 0 | Status: DISCONTINUED | OUTPATIENT
Start: 2025-02-09 | End: 2025-02-09

## 2025-02-09 RX ORDER — OXYCODONE HYDROCHLORIDE 30 MG/1
10 TABLET ORAL ONCE
Refills: 0 | Status: DISCONTINUED | OUTPATIENT
Start: 2025-02-09 | End: 2025-02-09

## 2025-02-09 RX ORDER — OXYCODONE HYDROCHLORIDE 30 MG/1
20 TABLET ORAL ONCE
Refills: 0 | Status: DISCONTINUED | OUTPATIENT
Start: 2025-02-09 | End: 2025-02-09

## 2025-02-09 RX ADMIN — OXYCODONE HYDROCHLORIDE 10 MILLIGRAM(S): 30 TABLET ORAL at 14:39

## 2025-02-09 RX ADMIN — LEVALBUTEROL HYDROCHLORIDE 0.63 MILLIGRAM(S): 1.25 SOLUTION RESPIRATORY (INHALATION) at 11:36

## 2025-02-09 RX ADMIN — Medication 1 SPRAY(S): at 05:09

## 2025-02-09 RX ADMIN — Medication 1 APPLICATION(S): at 05:23

## 2025-02-09 RX ADMIN — AMIODARONE HYDROCHLORIDE 200 MILLIGRAM(S): 50 INJECTION, SOLUTION INTRAVENOUS at 05:08

## 2025-02-09 RX ADMIN — METOPROLOL SUCCINATE 12.5 MILLIGRAM(S): 50 TABLET, EXTENDED RELEASE ORAL at 05:08

## 2025-02-09 RX ADMIN — Medication 1 APPLICATION(S): at 21:32

## 2025-02-09 RX ADMIN — OXYCODONE HYDROCHLORIDE 5 MILLIGRAM(S): 30 TABLET ORAL at 22:00

## 2025-02-09 RX ADMIN — OXYCODONE HYDROCHLORIDE 10 MILLIGRAM(S): 30 TABLET ORAL at 15:39

## 2025-02-09 RX ADMIN — METHOCARBAMOL 500 MILLIGRAM(S): 500 TABLET, FILM COATED ORAL at 21:29

## 2025-02-09 RX ADMIN — Medication 5 MILLIGRAM(S): at 21:30

## 2025-02-09 RX ADMIN — OXYCODONE HYDROCHLORIDE 10 MILLIGRAM(S): 30 TABLET ORAL at 12:04

## 2025-02-09 RX ADMIN — Medication 81 MILLIGRAM(S): at 12:04

## 2025-02-09 RX ADMIN — HEPARIN SODIUM 5000 UNIT(S): 1000 INJECTION INTRAVENOUS; SUBCUTANEOUS at 05:07

## 2025-02-09 RX ADMIN — ACETYLCYSTEINE 4 MILLILITER(S): 200 INHALANT RESPIRATORY (INHALATION) at 11:37

## 2025-02-09 RX ADMIN — OXYCODONE HYDROCHLORIDE 10 MILLIGRAM(S): 30 TABLET ORAL at 13:00

## 2025-02-09 RX ADMIN — MIRTAZAPINE 7.5 MILLIGRAM(S): 30 TABLET, FILM COATED ORAL at 12:04

## 2025-02-09 RX ADMIN — Medication 650 MILLIGRAM(S): at 22:00

## 2025-02-09 RX ADMIN — METOPROLOL SUCCINATE 12.5 MILLIGRAM(S): 50 TABLET, EXTENDED RELEASE ORAL at 18:18

## 2025-02-09 RX ADMIN — METHOCARBAMOL 500 MILLIGRAM(S): 500 TABLET, FILM COATED ORAL at 13:31

## 2025-02-09 RX ADMIN — METHOCARBAMOL 500 MILLIGRAM(S): 500 TABLET, FILM COATED ORAL at 05:07

## 2025-02-09 RX ADMIN — Medication 10 MILLILITER(S): at 19:00

## 2025-02-09 RX ADMIN — POLYETHYLENE GLYCOL 3350 17 GRAM(S): 17 POWDER, FOR SOLUTION ORAL at 05:08

## 2025-02-09 RX ADMIN — INSULIN LISPRO 2: 100 INJECTION, SOLUTION INTRAVENOUS; SUBCUTANEOUS at 14:10

## 2025-02-09 RX ADMIN — Medication 650 MILLIGRAM(S): at 21:30

## 2025-02-09 RX ADMIN — Medication 40 MILLIGRAM(S): at 12:05

## 2025-02-09 RX ADMIN — HEPARIN SODIUM 5000 UNIT(S): 1000 INJECTION INTRAVENOUS; SUBCUTANEOUS at 13:31

## 2025-02-09 RX ADMIN — LEVALBUTEROL HYDROCHLORIDE 0.63 MILLIGRAM(S): 1.25 SOLUTION RESPIRATORY (INHALATION) at 05:17

## 2025-02-09 RX ADMIN — ACETYLCYSTEINE 4 MILLILITER(S): 200 INHALANT RESPIRATORY (INHALATION) at 05:16

## 2025-02-09 RX ADMIN — ATORVASTATIN CALCIUM 80 MILLIGRAM(S): 80 TABLET, FILM COATED ORAL at 21:30

## 2025-02-09 RX ADMIN — Medication 50 MILLIGRAM(S): at 21:29

## 2025-02-09 RX ADMIN — Medication 1 TABLET(S): at 12:04

## 2025-02-09 RX ADMIN — Medication 500 MILLIGRAM(S): at 12:04

## 2025-02-09 RX ADMIN — OXYCODONE HYDROCHLORIDE 5 MILLIGRAM(S): 30 TABLET ORAL at 21:30

## 2025-02-09 NOTE — PROGRESS NOTE ADULT - SUBJECTIVE AND OBJECTIVE BOX
[id number] CANO    DATE OF SERVICE:02-09-25 @ 2785  PATIENT WAS SEEN AND EXAMINED BY JEAN PAUL GABRIEL MD 02-09-25 @ 0930   INTERIM EVENTS NOTED,LABORATORY AND RADIOLOGICAL RESULTS REVIEWED.  TELEMETRY REWIEVED >    PRESENTING CC:    SUBJ:       PMH -reviewed admission note, no change since admission  Heart failure: acute [ ] chronic [ ] acute or chronic [ ] diastolic [ ] systolic [ ] combined systolic and diastolic[ ]  GHASSAN: ATN[ ] renal medullary necrosis [ ] CKD I [ ]CKDII [ ]CKD III [ ]CKD IV [ ]CKD V [ ]Other pathological lesions [ ]    MEDICATIONS  (STANDING):  acetylcysteine 10%  Inhalation 4 milliLiter(s) Inhalation every 6 hours  aMIOdarone    Tablet 200 milliGRAM(s) Oral daily  ascorbic acid 500 milliGRAM(s) Oral daily  aspirin  chewable 81 milliGRAM(s) Oral daily  atorvastatin 80 milliGRAM(s) Oral at bedtime  chlorhexidine 0.12% Liquid 15 milliLiter(s) Oral Mucosa every 12 hours  chlorhexidine 2% Cloths 1 Application(s) Topical daily  chlorhexidine 4% Liquid 1 Application(s) Topical <User Schedule>  dextrose 50% Injectable 50 milliLiter(s) IV Push every 15 minutes  epoetin ignacia (EPOGEN) Injectable 83805 Unit(s) IV Push <User Schedule>  heparin   Injectable 5000 Unit(s) SubCutaneous every 8 hours  insulin lispro (ADMELOG) corrective regimen sliding scale   SubCutaneous every 6 hours  levalbuterol Inhalation 0.63 milliGRAM(s) Inhalation every 8 hours  melatonin 5 milliGRAM(s) Oral at bedtime  methocarbamol 500 milliGRAM(s) Oral every 8 hours  metoprolol tartrate 12.5 milliGRAM(s) Oral every 12 hours  mirtazapine 7.5 milliGRAM(s) Oral daily  Nephro-elicia 1 Tablet(s) Oral daily  pantoprazole  Injectable 40 milliGRAM(s) IV Push daily  polyethylene glycol 3350 17 Gram(s) Oral two times a day  sodium chloride 0.65% Nasal 1 Spray(s) Both Nostrils every 12 hours  sodium chloride 0.9%. 1000 milliLiter(s) (10 mL/Hr) IV Continuous <Continuous>  traZODone 50 milliGRAM(s) Oral at bedtime  vancomycin  IVPB 1000 milliGRAM(s) IV Intermittent every 24 hours    MEDICATIONS  (PRN):  acetaminophen     Tablet .. 650 milliGRAM(s) Oral every 6 hours PRN Mild Pain (1 - 3)  lidocaine/prilocaine Cream 1 Application(s) Topical daily PRN pre-HD  sodium chloride 0.9% lock flush 10 milliLiter(s) IV Push every 1 hour PRN Pre/post blood products, medications, blood draw, and to maintain line patency              REVIEW OF SYSTEMS:  Constitutional: [ ] fever, [ ]weight loss,  [ ]fatigue  Eyes: [ ] visual changes  Respiratory: [ ]shortness of breath;  [ ] cough, [ ]wheezing, [ ]chills, [ ]hemoptysis  Cardiovascular: [ ] chest pain, [ ]palpitations, [ ]dizziness,  [ ]leg swelling[ ]orthopnea[ ]PND  Gastrointestinal: [ ] abdominal pain, [ ]nausea, [ ]vomiting,  [ ]diarrhea   Genitourinary: [ ] dysuria, [ ] hematuria  Neurologic: [ ] headaches [ ] tremors[ ]weakness  Skin: [ ] itching, [ ]burning, [ ] rashes  Endocrine: [ ] heat or cold intolerance  Musculoskeletal: [ ] joint pain or swelling; [ ] muscle, back, or extremity pain  Psychiatric: [ ] depression, [ ]anxiety, [ ]mood swings, or [ ]difficulty sleeping  Hematologic: [ ] easy bruising, [ ] bleeding gums    [x] All remaining systems negative except as per above.   [ ]Unable to obtain.    Vital Signs Last 24 Hrs  T(C): 36.6 (09 Feb 2025 20:00), Max: 36.9 (09 Feb 2025 04:00)  T(F): 97.8 (09 Feb 2025 20:00), Max: 98.5 (09 Feb 2025 04:00)  HR: 60 (09 Feb 2025 23:00) (57 - 111)  BP: 127/70 (09 Feb 2025 23:00) (117/65 - 165/79)  BP(mean): 94 (09 Feb 2025 23:00) (76 - 120)  RR: 14 (09 Feb 2025 23:00) (12 - 25)  SpO2: 95% (09 Feb 2025 23:00) (95% - 100%)    Parameters below as of 09 Feb 2025 20:00  Patient On (Oxygen Delivery Method): ventilator    O2 Concentration (%): 40  I&O's Summary    08 Feb 2025 07:01  -  09 Feb 2025 07:00  --------------------------------------------------------  IN: 1710 mL / OUT: 0 mL / NET: 1710 mL    09 Feb 2025 07:01  -  09 Feb 2025 23:38  --------------------------------------------------------  IN: 1137 mL / OUT: 0 mL / NET: 1137 mL      Orthostatic VS      PHYSICAL EXAM:  General: No acute distress BMI-  HEENT: EOMI, PERRL  Neck: Supple, [ ] JVD  Lungs: Equal air entry bilaterally; [ ] rales [ ] wheezing [ ] rhonchi  Heart: Regular rate and rhythm; [ ] murmur   /6 [ ] systolic [ ] diastolic [ ] radiation[ ] rubs [ ]  gallops  Abdomen: Nontender, bowel sounds present  Extremities: No clubbing, cyanosis, [ ] edema  Nervous system:  Alert & Oriented X3, no focal deficits  Psychiatric: Normal affect  Skin: No rashes or lesions    LABS:  02-09    135  |  94[L]  |  32[H]  ----------------------------<  170[H]  4.3   |  25  |  5.13[H]    Ca    8.7      09 Feb 2025 00:21  Phos  3.7     02-09  Mg     2.2     02-09    TPro  6.3  /  Alb  2.9[L]  /  TBili  0.4  /  DBili  x   /  AST  18  /  ALT  10  /  AlkPhos  99  02-09    Creatinine Trend: 5.13<--, 4.28<--, 4.37<--, 4.45<--, 5.10<--, 4.58<--                        8.0    8.71  )-----------( 198      ( 09 Feb 2025 00:20 )             25.2       Lipid Panel:   Cardiac Enzymes:

## 2025-02-09 NOTE — PROGRESS NOTE ADULT - ASSESSMENT
54M with PMH of HTN, HLD, ESRD on HD MWF via LUE AVF, ascites (requiring repeat paracenteses q4-6wks), NICM with moderate LV dysfunction w/ EF 35-40%(2023) and CAD s/p atherectomy + SALLIE to D1(4/11/2024) presents to [facility] transferred from [facility] for CABG eval  Nephro on [facility] for HD    ESRD on HD   Regular schedule MWF   Access LUE AVF  Center [facility]  Nephrologist Dr. Bailey  S/p HD on 01/29, 2/3 and 02/05  S/p HD on 02/07 cw MWF due tomm  Will attempt 3 L UF with HD tomm  Keep MAP>65  Renal Diet  Consent in physical chart    Hyper/Hypokalemia  Monitor K, will change dialysate fluid as needed    HTN  bp fluctuating; acceptable  monitor bp     CKD MBD  Can send a PTH  Ca and Phos daily    Anemia  CRISTIANE with HD  Transfuse if hgb <7    CAD with multivessel disease  s/p CABG 12/30  CTICU following    Hypoxic Resp failure requiring Trach  Per surgery  S/p bronchoscopy, pulm following

## 2025-02-09 NOTE — PROGRESS NOTE ADULT - SUBJECTIVE AND OBJECTIVE BOX
Patient seen and examined at the bedside.    Remains critically ill on continuous ICU monitoring.      Brief Summary:  53 yo M with multiple medical problems including CAD s/p PCI in 2024 s/p CABG x 3 on 24: Intubated for hypoxia & left lung white out s/p awake bronch with removal of copious mucopurulent secretions  : s/p IABP insertion, sepsis/septic shock due to E coli   ESBL pneumonia, collapsed Left Lung, s/p multiple bronch    tracheostomy tube placement    VRE E. Faecium Bcx   +HSV PCR BAL   tissue cx from back abscess - Serratia   - - intermittent bradycardia/junctional episodes with hypotension  2/3: off , off theophylline. iHD 1L UF  : bronch for Left lung collapse wound vac, 2decho w/ moderate pericardial effusion - similar as before, US abd: small - moderate ascites - monitor at this time.  Wound vac placed for sacral wounds  : iHD-1L UF  : bronch today off positive pressure. : MRSA and serratia from cyst on the back. Plan for Levaquin + Vanc x 5 days      24 Hour events:  New foot drop - Levaquin stopped for tendonitis concern.  Tolerating short trach collar trials.  Rested on vent overnight.  Ambulated.    Objective:  Vital Signs Last 24 Hrs  T(C): 36.9 (2025 04:00), Max: 37.1 (2025 20:00)  T(F): 98.5 (2025 04:00), Max: 98.7 (2025 20:00)  HR: 60 (2025 06:00) (60 - 88)  BP: 141/79 (2025 06:00) (102/52 - 158/77)  BP(mean): 104 (2025 06:00) (72 - 117)  RR: 17 (2025 06:00) (15 - 29)  SpO2: 96% (2025 06:00) (91% - 100%)    Parameters below as of 2025 05:44  Patient On (Oxygen Delivery Method): ventilator        Mode: CPAP with PS  FiO2: 40  PEEP: 10  PS: 15  MAP: 12  PIP: 22              Physical Exam:   General: Alert and interactive, sitting in chair.   Neurology: Oriented, following commands  Respiratory: Clear bilaterally, Trach site ok.  CV: A fib  Abdominal: Soft, Nontender  Extremities: Warm, well-perfused  -------------------------------------------------------------------------------------------------------------------------------    Labs:                        8.0    8.71  )-----------( 198      ( 2025 00:20 )             25.2     02-    135  |  94[L]  |  32[H]  ----------------------------<  170[H]  4.3   |  25  |  5.13[H]    Ca    8.7      2025 00:21  Phos  3.7     -  Mg     2.2     -    TPro  6.3  /  Alb  2.9[L]  /  TBili  0.4  /  DBili  x   /  AST  18  /  ALT  10  /  AlkPhos  99  02-09    LIVER FUNCTIONS - ( 2025 00:21 )  Alb: 2.9 g/dL / Pro: 6.3 g/dL / ALK PHOS: 99 U/L / ALT: 10 U/L / AST: 18 U/L / GGT: x               ------------------------------------------------------------------------------------------------------------------------------  Assessment:  53 yo M s/p CABG x 3 on 24    Postop acute respiratory failure  Acute blood loss anemia  ESRD on iHD   E coli bacteremia  pneumonia  VRE E. Faecium   Bronchomalacia   Plan:  ***Neuro***  Postoperative acute pain control with Robaxin and Tylenol  Trazodone, Remeron, and Melatonin nightly  PT ongoing    ***Cardiovascular***  s/p CABG x 3  A fib - Tolerating beta blocker, PO Amio for Afib  ASA / Statin daily    ***Pulmonary***  Postoperative acute respiratory failure  Tracheostomy    Requiring frequent bronchs for left lobe collpase  Mucomyst, duo nebs, HTS to help mobilize secretions  Left lung PNA, + e coli ESBL - cleared but now with VRE   Tolerates trach collar with high flow, will rest on vent overnight.  Bronchoscopy by IP on  - no intervention planned for bronchomalacia    ***GI***  Tolerating Tube feeds at goal  Protonix for stress ulcer prophylaxis.   Bowel regimen    ***Renal***  ESRD - tolerating iHD   Nephro-Lisette for renal support  iHD  (MWF)   - US abd: mod ascites    ***ID***  Monitor cultures   BAL - commensal manjit    VRE BAL - Linezolid    Acyclovir for + HSV in BAL   Serratia on tissue cx and h/o ESBL PNA - Meropenem until : MRSA and serratia from cyst on the back. Levaquin + Vanco, but now off Levaquin.    ***Endocrine***  Hyperglycemia - Insulin sliding scale    ***Hematology***  Acute blood loss anemia - no transfusion indication currently.  CBC, coags  Continue Epogen.  Heparin SQ for DVT  No AC s/p cardioversion. Not on AC due to moderate pericardial effusion    Wound:  s/p sacral debridement on  . wound vac placed on  and changed three times a week            Patient requires continuous monitoring with bedside rhythm monitoring, pulse oximetry monitoring, and continuous central venous and arterial pressure monitoring; and intermittent blood gas analysis. Care plan discussed with the ICU care team.   Patient remains critical, at risk for life threatening decompensation.    I have spent 30 minutes providing critical care management to this patient.    By signing my name below, I, Mel Castro, attest that this documentation has been prepared under the direction and in the presence of Jen Sierra MD  Electronically signed: Mel Castro, 25 @ 07:18    I, Jen Sierra MD, personally performed the services described in this documentation. all medical record entries made by the scribe were at my direction and in my presence. I have reviewed the chart and agree that the record reflects my personal performance and is accurate and complete  Electronically signed: Jen Sierra MD Patient seen and examined at the bedside.    Remains critically ill on continuous ICU monitoring.      Brief Summary:  53 yo M with multiple medical problems including CAD s/p PCI in 2024 s/p CABG x 3 on 24: Intubated for hypoxia & left lung white out s/p awake bronch with removal of copious mucopurulent secretions  : s/p IABP insertion, sepsis/septic shock due to E coli   ESBL pneumonia, collapsed Left Lung, s/p multiple bronch    tracheostomy tube placement    VRE E. Faecium Bcx   +HSV PCR BAL   tissue cx from back abscess - Serratia   - - intermittent bradycardia/junctional episodes with hypotension  2/3: off , off theophylline. iHD 1L UF  : bronch for Left lung collapse wound vac, 2decho w/ moderate pericardial effusion - similar as before, US abd: small - moderate ascites - monitor at this time.  Wound vac placed for sacral wounds  : iHD-1L UF  : bronch today off positive pressure. : MRSA and serratia from cyst on the back. Plan for Levaquin + Vanc x 5 days      24 Hour events:  Limited trach collar trials.  Ambulating well.      Objective:  Vital Signs Last 24 Hrs  T(C): 36.9 (2025 04:00), Max: 37.1 (2025 20:00)  T(F): 98.5 (2025 04:00), Max: 98.7 (2025 20:00)  HR: 60 (2025 06:00) (60 - 88)  BP: 141/79 (2025 06:00) (102/52 - 158/77)  BP(mean): 104 (2025 06:00) (72 - 117)  RR: 17 (2025 06:00) (15 - 29)  SpO2: 96% (2025 06:00) (91% - 100%)    Parameters below as of 2025 05:44  Patient On (Oxygen Delivery Method): ventilator    Mode: CPAP with PS  FiO2: 40  PEEP: 10  PS: 15  MAP: 12  PIP: 22              Physical Exam:   General: Alert and interactive  Neurology: Oriented, following commands  Respiratory: Clear bilaterally, Trach site ok.  CV: A fib  Abdominal: Soft, Nontender  Extremities: Warm, well-perfused  -------------------------------------------------------------------------------------------------------------------------------    Labs:                        8.0    8.71  )-----------( 198      ( 2025 00:20 )             25.2     02-    135  |  94[L]  |  32[H]  ----------------------------<  170[H]  4.3   |  25  |  5.13[H]    Ca    8.7      2025 00:21  Phos  3.7     -  Mg     2.2     -    TPro  6.3  /  Alb  2.9[L]  /  TBili  0.4  /  DBili  x   /  AST  18  /  ALT  10  /  AlkPhos  99  02-09    LIVER FUNCTIONS - ( 2025 00:21 )  Alb: 2.9 g/dL / Pro: 6.3 g/dL / ALK PHOS: 99 U/L / ALT: 10 U/L / AST: 18 U/L / GGT: x             ------------------------------------------------------------------------------------------------------------------------------  Assessment:  53 yo M s/p CABG x 3 on 24    Postop acute respiratory failure  Acute blood loss anemia  ESRD on iHD       Plan:  ***Neuro***  Postoperative acute pain control with Robaxin and Tylenol  Trazodone, Remeron, and Melatonin nightly  PT ongoing    ***Cardiovascular***  s/p CABG x 3  A fib - Tolerating beta blocker, PO Amio for Afib  ASA / Statin daily  TTE today to reassess effusion.    ***Pulmonary***  Postoperative acute respiratory failure  Tracheostomy    Mucomyst, normal saline nebs, Xopenex to help mobilize secretions  Left lung PNA, + e coli ESBL - cleared but now with VRE   Trach collar trials ongoing.    ***GI***  Tolerating Tube feeds at goal  Protonix for stress ulcer prophylaxis.   Bowel regimen    ***Renal***  ESRD - tolerating iHD - next HD tomorrow.  Nephro-Lisette for renal support    ***ID***  Monitor cultures   BAL - commensal manjit    VRE BAL - Linezolid    Acyclovir for + HSV in BAL   Serratia on tissue cx and h/o ESBL PNA - Meropenem until : MRSA and serratia from cyst on the back. Levaquin + Vanco, but now off Levaquin.  Remains on Vanco.    ***Endocrine***  Hyperglycemia - Insulin sliding scale    ***Hematology***  Acute blood loss anemia - no transfusion indication currently.  CBC, coags  Continue Epogen.  Heparin SQ for DVT  No AC s/p cardioversion. Not on AC due to moderate pericardial effusion    Wound:  s/p sacral debridement on  . wound vac placed on  and changed three times a week      Care plan discussed with the ICU care team.   Patient remains critical, at risk for life threatening decompensation.    I have spent 40 minutes providing critical care management to this patient.    By signing my name below, I, Mel Catsro, attest that this documentation has been prepared under the direction and in the presence of Jen Sierra MD  Electronically signed: Mel Castro, 25 @ 07:18    I, Jen Sierra MD, personally performed the services described in this documentation. All medical record entries made by the scribe were at my direction and in my presence. I have reviewed the chart and agree that the record reflects my personal performance and is accurate and complete  Electronically signed: Jen Sierra MD

## 2025-02-09 NOTE — PROGRESS NOTE ADULT - ASSESSMENT
54M with PMH of HTN, HLD, ESRD on HD MWF via LUE AVF, ascites (requiring repeat paracenteses q4-6wks), NICM with moderate LV dysfunction w/ EF 35-40%() and CAD s/p atherectomy + SALLIE to D1(2024) presents to [facility] transferred from [facility]. Patient initially presented to [facility] ED with shortness of breath. Of note he was recently admitted from - for acute cholecystitis s/p cholecystectomy. In [facility] ED, pt was hypoxic to 86% on RA, trop 1.7K, EKG no new changes, CXR fluid overload. Admitted for AHRF 2/2 fluid overload. Pt received HD on , ,  and . DAPT was resumed, TTE showed improvement in LVEF to 40-45%. Stress test was abnormal with 1mm inferior STD, reversible ischemia in the inferior wall and fixed defects in the lateral, anterior and apical walls with post stress EF of 24%.     Pt was then transferred to [facility] for cardiac cath which showed pLAD 70% ISR, mLCx 70%, D1 severe dz. Patient now transferred to [facility] CTS Dr. Rivers for CABG eval.# CAD s/p NSTEMI  Hx CAD s/p PCI LAD   Presented with ADHF elevated troponins  Positive NST1-Cath- pLAD 70% ISR, mLCx 70%, D1 severe dz.   TTE EF 35% Severe TRWas on Plavix-p2y12- 321 been off Plavix since -POD#1-C3L free LIMA-LAD; SVG-Diag; HRT-fbhsAE-Ydnewgigl on  Sinus rhythm,Amio for AF prophylaxis  -OOB off  Sinus rhythm   -POD#3-On /pressors-EF 25-30% RV failure with transaminitis-Sinus Fopviq20/03-POD#4-Was intubated for hypoxia-gradual hypotension on /pressors had IABP inserted this morning  -POD#5-s/p IABP insertion, sepsis/septic shock due to GNR/ECOLI HD cath placed   Bronched yesterday 1/3 - minimal secretions with rust-tinged sputum   ESBL-E Coli bacteremia on meropenam    - POD#7 Atrial Fib ,remains intubated weaning IABP 1:3,off pressors on CRRT  -IABP removed.Remains on vent-Alex 10ppm,off Levo- CVP 10 / MAP 71 / Hct 25.6% / Lactate 1.7-Atrial Fibrillation  -Intubated on Alex 10ppm, gtt, BP better-AF~~90's on Amio-had bronch still febrile Tmax 31012/-Extubated awake alert on HFNC AF,on Dobutrex-CRRT,Cultures no growth    01/10-OOB on BiPAP Sinus Rhythm on -SR/ MAP 57/ CVP 10/  Hct 26.7 / Lact 1.4  -s/p EDU/DCCV-Sinus rhythm on -SR / MAP 52 / CVP 12/ Hct 25.8 / Lact 0.7-had bronch with BAL Left lung  -Sinus rhythm on -for repeat Bronch-SR / MAP 67 / CVP 11 / Hct 27.4% / Lact 0.8  -s/p Trach on  TTE EF 55%-SR / CVP 2 / MAP 63 / Hct 25.9% / Lactate 0.9  -Awake on Trach collar off  c/o buttock pain had bronch yesterday-BAL-Sinus rhythm  -Sinus rhythm on vent at night-BP stable may need to increase hydrallazine 25 mg q8  -TTE EF 60% still needs Vent support at night Bradycardic trial of Bryce now back on   -Awake alert Sinus rhythm BP elevated  remains on ,Nocturnal vent support  resume Hydralazine 25mg q8  -Reverted to AF rvr  -s/p BRonc/BAL debridement sacral decub  -Awake noted AF RVR no further bradyarrhythmias-Start BBlockers      # Acute on Chronic Systolic Heart Failure now improved  EF-35% ,severe TR  Had HD post op  GDMT post op  LVEF improved post bypass but now at 23-30%-BiV dysfunction on  now off pressors  -TTE EF 40%,trace effusion  ECG c/w pericarditis-was on colchicine   01/15-TTE EF 55%  -TTE EF 60%   -Normal LV systolic function Moderate pericardial effusion  -On TC-HF and Vent support at nights   -        Vascular evaluated  AVGraft /?steal causing RV failure-'' Very low suspicion of steal Sd and AVF causing current clinical state. ''   VA Duplex Hemodialysis Access, Left (25 @ 13:35) >   Arterial flow volume of 1301 mL/m, and the venous flow volume of  1492 mL/m are consistent with a normally functioning dialysis access  fistula.      # Ascites  Hx chronic ascites  Has drainage q4-6 weeks  Last drained -4600mL  ? ascites related to severe TR  Had nftzlnreouwa-Tgweniw-iv growth   CT Abdomen 01/10-Large volume ascites-  US abdomen--Small to moderate volume abdominal/pelvic ascites      # ESRD  Now off CRRT transition to HD

## 2025-02-09 NOTE — PROGRESS NOTE ADULT - SUBJECTIVE AND OBJECTIVE BOX
[facility]    Dr. Nica Styles     Office [phone] (9 am to 5 pm)  Service: [phone] (5pm to 9am)  Also Available on TEAMS      RENAL PROGRESS NOTE: DATE OF SERVICE 02-09-25 @ 14:09    Patient is a 54y old  Male who presents with a chief complaint of CAD s/p CABG (08 Feb 2025 06:54)      Patient seen and examined at bedside. No chest pain/sob    VITALS:  T(F): 98.5 (02-09-25 @ 04:00), Max: 98.7 (02-08-25 @ 20:00)  HR: 66 (02-09-25 @ 13:00)  BP: 122/58 (02-09-25 @ 13:00)  RR: 17 (02-09-25 @ 13:00)  SpO2: 98% (02-09-25 @ 12:03)  Wt(kg): --    02-08 @ 07:01  -  02-09 @ 07:00  --------------------------------------------------------  IN: 1710 mL / OUT: 0 mL / NET: 1710 mL    02-09 @ 07:01  -  02-09 @ 14:09  --------------------------------------------------------  IN: 325 mL / OUT: 0 mL / NET: 325 mL          PHYSICAL EXAM:  Constitutional: NAD  Neck: No JVD  Respiratory: CTAB, no wheezes, rales or rhonchi  Cardiovascular: S1, S2, RRR  Gastrointestinal: BS+, soft, NT/ND  Extremities: No peripheral edema    Hospital Medications:   MEDICATIONS  (STANDING):  acetylcysteine 10%  Inhalation 4 milliLiter(s) Inhalation every 6 hours  aMIOdarone    Tablet 200 milliGRAM(s) Oral daily  ascorbic acid 500 milliGRAM(s) Oral daily  aspirin  chewable 81 milliGRAM(s) Oral daily  atorvastatin 80 milliGRAM(s) Oral at bedtime  chlorhexidine 0.12% Liquid 15 milliLiter(s) Oral Mucosa every 12 hours  chlorhexidine 2% Cloths 1 Application(s) Topical daily  chlorhexidine 4% Liquid 1 Application(s) Topical <User Schedule>  dextrose 50% Injectable 50 milliLiter(s) IV Push every 15 minutes  epoetin ignacia (EPOGEN) Injectable 53383 Unit(s) IV Push <User Schedule>  heparin   Injectable 5000 Unit(s) SubCutaneous every 8 hours  insulin lispro (ADMELOG) corrective regimen sliding scale   SubCutaneous every 6 hours  levalbuterol Inhalation 0.63 milliGRAM(s) Inhalation every 8 hours  melatonin 5 milliGRAM(s) Oral at bedtime  methocarbamol 500 milliGRAM(s) Oral every 8 hours  metoprolol tartrate 12.5 milliGRAM(s) Oral every 12 hours  mirtazapine 7.5 milliGRAM(s) Oral daily  Nephro-elicia 1 Tablet(s) Oral daily  pantoprazole  Injectable 40 milliGRAM(s) IV Push daily  polyethylene glycol 3350 17 Gram(s) Oral two times a day  sodium chloride 0.65% Nasal 1 Spray(s) Both Nostrils every 12 hours  sodium chloride 0.9%. 1000 milliLiter(s) (10 mL/Hr) IV Continuous <Continuous>  traZODone 50 milliGRAM(s) Oral at bedtime  vancomycin  IVPB 1000 milliGRAM(s) IV Intermittent every 24 hours      LABS:  02-09    135  |  94[L]  |  32[H]  ----------------------------<  170[H]  4.3   |  25  |  5.13[H]    Ca    8.7      09 Feb 2025 00:21  Phos  3.7     02-09  Mg     2.2     02-09    TPro  6.3  /  Alb  2.9[L]  /  TBili  0.4  /  DBili      /  AST  18  /  ALT  10  /  AlkPhos  99  02-09    Creatinine Trend: 5.13 <--, 4.28 <--, 4.37 <--, 4.45 <--, 5.10 <--, 4.58 <--, 4.12 <--, 5.74 <--, 5.38 <--    Albumin: 2.9 g/dL (02-09 @ 00:21)  Phosphorus: 3.7 mg/dL (02-09 @ 00:21)                              8.0    8.71  )-----------( 198      ( 09 Feb 2025 00:20 )             25.2     Urine Studies:  Urinalysis - [02-09-25 @ 00:21]      Color  / Appearance  / SG  / pH       Gluc 170 / Ketone   / Bili  / Urobili        Blood  / Protein  / Leuk Est  / Nitrite       RBC  / WBC  / Hyaline  / Gran  / Sq Epi  / Non Sq Epi  / Bacteria       Iron 29, TIBC 143, %sat 20      [12-19-24 @ 05:00]  Ferritin 904      [12-19-24 @ 05:00]  TSH 4.75      [12-24-24 @ 06:50]        RADIOLOGY & ADDITIONAL STUDIES:

## 2025-02-10 LAB
ALBUMIN SERPL ELPH-MCNC: 2.8 G/DL — LOW (ref 3.3–5)
ALP SERPL-CCNC: 97 U/L — SIGNIFICANT CHANGE UP (ref 40–120)
ALT FLD-CCNC: 11 U/L — SIGNIFICANT CHANGE UP (ref 10–45)
ANION GAP SERPL CALC-SCNC: 16 MMOL/L — SIGNIFICANT CHANGE UP (ref 5–17)
APTT BLD: 34.6 SEC — SIGNIFICANT CHANGE UP (ref 24.5–35.6)
APTT BLD: 52.5 SEC — HIGH (ref 24.5–35.6)
AST SERPL-CCNC: 18 U/L — SIGNIFICANT CHANGE UP (ref 10–40)
BASOPHILS # BLD AUTO: 0.04 K/UL — SIGNIFICANT CHANGE UP (ref 0–0.2)
BUN SERPL-MCNC: 38 MG/DL — HIGH (ref 7–23)
CALCIUM SERPL-MCNC: 8.7 MG/DL — SIGNIFICANT CHANGE UP (ref 8.4–10.5)
CHLORIDE SERPL-SCNC: 94 MMOL/L — LOW (ref 96–108)
CO2 SERPL-SCNC: 25 MMOL/L — SIGNIFICANT CHANGE UP (ref 22–31)
CREAT SERPL-MCNC: 5.93 MG/DL — HIGH (ref 0.5–1.3)
EGFR: 10 ML/MIN/1.73M2 — LOW
EGFR: 10 ML/MIN/1.73M2 — LOW
EOSINOPHIL # BLD AUTO: 0.74 K/UL — HIGH (ref 0–0.5)
EOSINOPHIL NFR BLD AUTO: 8.1 % — HIGH (ref 0–6)
GLUCOSE SERPL-MCNC: 146 MG/DL — HIGH (ref 70–99)
HCT VFR BLD CALC: 25 % — LOW (ref 39–50)
IMM GRANULOCYTES NFR BLD AUTO: 0.7 % — SIGNIFICANT CHANGE UP (ref 0–0.9)
INR BLD: 1.33 RATIO — HIGH (ref 0.85–1.16)
INR BLD: 1.33 RATIO — HIGH (ref 0.85–1.16)
LYMPHOCYTES # BLD AUTO: 0.57 K/UL — LOW (ref 1–3.3)
LYMPHOCYTES # BLD AUTO: 6.3 % — LOW (ref 13–44)
MAGNESIUM SERPL-MCNC: 2.3 MG/DL — SIGNIFICANT CHANGE UP (ref 1.6–2.6)
MCHC RBC-ENTMCNC: 30.2 PG — SIGNIFICANT CHANGE UP (ref 27–34)
MCHC RBC-ENTMCNC: 31.2 G/DL — LOW (ref 32–36)
MCV RBC AUTO: 96.9 FL — SIGNIFICANT CHANGE UP (ref 80–100)
MONOCYTES # BLD AUTO: 0.99 K/UL — HIGH (ref 0–0.9)
MONOCYTES NFR BLD AUTO: 10.9 % — SIGNIFICANT CHANGE UP (ref 2–14)
NEUTROPHILS # BLD AUTO: 6.71 K/UL — SIGNIFICANT CHANGE UP (ref 1.8–7.4)
NEUTROPHILS NFR BLD AUTO: 73.6 % — SIGNIFICANT CHANGE UP (ref 43–77)
NRBC # BLD: 0 /100 WBCS — SIGNIFICANT CHANGE UP (ref 0–0)
NRBC BLD-RTO: 0 /100 WBCS — SIGNIFICANT CHANGE UP (ref 0–0)
PHOSPHATE SERPL-MCNC: 4.4 MG/DL — SIGNIFICANT CHANGE UP (ref 2.5–4.5)
PLATELET # BLD AUTO: 216 K/UL — SIGNIFICANT CHANGE UP (ref 150–400)
POTASSIUM SERPL-MCNC: 5 MMOL/L — SIGNIFICANT CHANGE UP (ref 3.5–5.3)
POTASSIUM SERPL-SCNC: 5 MMOL/L — SIGNIFICANT CHANGE UP (ref 3.5–5.3)
PROT SERPL-MCNC: 6.1 G/DL — SIGNIFICANT CHANGE UP (ref 6–8.3)
PROTHROM AB SERPL-ACNC: 15.1 SEC — HIGH (ref 9.9–13.4)
PROTHROM AB SERPL-ACNC: 15.2 SEC — HIGH (ref 9.9–13.4)
RBC # BLD: 2.58 M/UL — LOW (ref 4.2–5.8)
RBC # FLD: 23.1 % — HIGH (ref 10.3–14.5)
SODIUM SERPL-SCNC: 135 MMOL/L — SIGNIFICANT CHANGE UP (ref 135–145)
WBC # BLD: 9.11 K/UL — SIGNIFICANT CHANGE UP (ref 3.8–10.5)
WBC # FLD AUTO: 9.11 K/UL — SIGNIFICANT CHANGE UP (ref 3.8–10.5)

## 2025-02-10 PROCEDURE — 99291 CRITICAL CARE FIRST HOUR: CPT

## 2025-02-10 PROCEDURE — 99232 SBSQ HOSP IP/OBS MODERATE 35: CPT

## 2025-02-10 PROCEDURE — 99223 1ST HOSP IP/OBS HIGH 75: CPT | Mod: 57

## 2025-02-10 PROCEDURE — G0545: CPT

## 2025-02-10 PROCEDURE — 71045 X-RAY EXAM CHEST 1 VIEW: CPT | Mod: 26

## 2025-02-10 PROCEDURE — 99222 1ST HOSP IP/OBS MODERATE 55: CPT

## 2025-02-10 RX ORDER — ACETAMINOPHEN 500 MG/5ML
1000 LIQUID (ML) ORAL ONCE
Refills: 0 | Status: COMPLETED | OUTPATIENT
Start: 2025-02-10 | End: 2025-02-10

## 2025-02-10 RX ORDER — HEPARIN SODIUM 1000 [USP'U]/ML
500 INJECTION INTRAVENOUS; SUBCUTANEOUS
Qty: 25000 | Refills: 0 | Status: DISCONTINUED | OUTPATIENT
Start: 2025-02-10 | End: 2025-02-11

## 2025-02-10 RX ORDER — OXYCODONE HYDROCHLORIDE 30 MG/1
10 TABLET ORAL ONCE
Refills: 0 | Status: DISCONTINUED | OUTPATIENT
Start: 2025-02-10 | End: 2025-02-10

## 2025-02-10 RX ORDER — OXYCODONE HYDROCHLORIDE 30 MG/1
10 TABLET ORAL EVERY 4 HOURS
Refills: 0 | Status: DISCONTINUED | OUTPATIENT
Start: 2025-02-10 | End: 2025-02-17

## 2025-02-10 RX ORDER — OXYCODONE HYDROCHLORIDE 30 MG/1
5 TABLET ORAL EVERY 4 HOURS
Refills: 0 | Status: DISCONTINUED | OUTPATIENT
Start: 2025-02-10 | End: 2025-02-11

## 2025-02-10 RX ORDER — METOPROLOL SUCCINATE 50 MG/1
12.5 TABLET, EXTENDED RELEASE ORAL
Refills: 0 | Status: DISCONTINUED | OUTPATIENT
Start: 2025-02-10 | End: 2025-02-18

## 2025-02-10 RX ADMIN — LEVALBUTEROL HYDROCHLORIDE 0.63 MILLIGRAM(S): 1.25 SOLUTION RESPIRATORY (INHALATION) at 05:34

## 2025-02-10 RX ADMIN — Medication 1000 MILLIGRAM(S): at 16:30

## 2025-02-10 RX ADMIN — OXYCODONE HYDROCHLORIDE 10 MILLIGRAM(S): 30 TABLET ORAL at 18:25

## 2025-02-10 RX ADMIN — Medication 15 MILLILITER(S): at 17:08

## 2025-02-10 RX ADMIN — OXYCODONE HYDROCHLORIDE 10 MILLIGRAM(S): 30 TABLET ORAL at 14:50

## 2025-02-10 RX ADMIN — ATORVASTATIN CALCIUM 80 MILLIGRAM(S): 80 TABLET, FILM COATED ORAL at 21:44

## 2025-02-10 RX ADMIN — METHOCARBAMOL 500 MILLIGRAM(S): 500 TABLET, FILM COATED ORAL at 05:23

## 2025-02-10 RX ADMIN — HEPARIN SODIUM 5 UNIT(S)/HR: 1000 INJECTION INTRAVENOUS; SUBCUTANEOUS at 11:00

## 2025-02-10 RX ADMIN — Medication 50 MILLIGRAM(S): at 22:37

## 2025-02-10 RX ADMIN — Medication 1 TABLET(S): at 11:15

## 2025-02-10 RX ADMIN — Medication 1 APPLICATION(S): at 21:44

## 2025-02-10 RX ADMIN — OXYCODONE HYDROCHLORIDE 10 MILLIGRAM(S): 30 TABLET ORAL at 18:55

## 2025-02-10 RX ADMIN — OXYCODONE HYDROCHLORIDE 10 MILLIGRAM(S): 30 TABLET ORAL at 06:00

## 2025-02-10 RX ADMIN — MIRTAZAPINE 7.5 MILLIGRAM(S): 30 TABLET, FILM COATED ORAL at 11:16

## 2025-02-10 RX ADMIN — Medication 40 MILLIGRAM(S): at 11:16

## 2025-02-10 RX ADMIN — METHOCARBAMOL 500 MILLIGRAM(S): 500 TABLET, FILM COATED ORAL at 21:44

## 2025-02-10 RX ADMIN — OXYCODONE HYDROCHLORIDE 10 MILLIGRAM(S): 30 TABLET ORAL at 10:09

## 2025-02-10 RX ADMIN — METHOCARBAMOL 500 MILLIGRAM(S): 500 TABLET, FILM COATED ORAL at 14:13

## 2025-02-10 RX ADMIN — ACETYLCYSTEINE 4 MILLILITER(S): 200 INHALANT RESPIRATORY (INHALATION) at 23:44

## 2025-02-10 RX ADMIN — Medication 5 MILLIGRAM(S): at 22:37

## 2025-02-10 RX ADMIN — Medication 400 MILLIGRAM(S): at 05:00

## 2025-02-10 RX ADMIN — ACETYLCYSTEINE 4 MILLILITER(S): 200 INHALANT RESPIRATORY (INHALATION) at 12:25

## 2025-02-10 RX ADMIN — Medication 81 MILLIGRAM(S): at 11:16

## 2025-02-10 RX ADMIN — Medication 1000 MILLIGRAM(S): at 05:15

## 2025-02-10 RX ADMIN — LEVALBUTEROL HYDROCHLORIDE 0.63 MILLIGRAM(S): 1.25 SOLUTION RESPIRATORY (INHALATION) at 03:12

## 2025-02-10 RX ADMIN — HEPARIN SODIUM 5000 UNIT(S): 1000 INJECTION INTRAVENOUS; SUBCUTANEOUS at 05:37

## 2025-02-10 RX ADMIN — AMIODARONE HYDROCHLORIDE 200 MILLIGRAM(S): 50 INJECTION, SOLUTION INTRAVENOUS at 05:22

## 2025-02-10 RX ADMIN — METOPROLOL SUCCINATE 12.5 MILLIGRAM(S): 50 TABLET, EXTENDED RELEASE ORAL at 17:08

## 2025-02-10 RX ADMIN — Medication 500 MILLIGRAM(S): at 11:15

## 2025-02-10 RX ADMIN — OXYCODONE HYDROCHLORIDE 10 MILLIGRAM(S): 30 TABLET ORAL at 15:20

## 2025-02-10 RX ADMIN — Medication 400 MILLIGRAM(S): at 16:00

## 2025-02-10 RX ADMIN — ACETYLCYSTEINE 4 MILLILITER(S): 200 INHALANT RESPIRATORY (INHALATION) at 03:13

## 2025-02-10 RX ADMIN — INSULIN LISPRO 2: 100 INJECTION, SOLUTION INTRAVENOUS; SUBCUTANEOUS at 05:37

## 2025-02-10 RX ADMIN — ACETYLCYSTEINE 4 MILLILITER(S): 200 INHALANT RESPIRATORY (INHALATION) at 05:33

## 2025-02-10 RX ADMIN — OXYCODONE HYDROCHLORIDE 10 MILLIGRAM(S): 30 TABLET ORAL at 09:39

## 2025-02-10 RX ADMIN — EPOETIN ALFA 10000 UNIT(S): 10000 SOLUTION INTRAVENOUS; SUBCUTANEOUS at 12:38

## 2025-02-10 RX ADMIN — OXYCODONE HYDROCHLORIDE 10 MILLIGRAM(S): 30 TABLET ORAL at 06:30

## 2025-02-10 RX ADMIN — LEVALBUTEROL HYDROCHLORIDE 0.63 MILLIGRAM(S): 1.25 SOLUTION RESPIRATORY (INHALATION) at 12:24

## 2025-02-10 RX ADMIN — LEVALBUTEROL HYDROCHLORIDE 0.63 MILLIGRAM(S): 1.25 SOLUTION RESPIRATORY (INHALATION) at 23:44

## 2025-02-10 NOTE — BH CONSULTATION LIAISON ASSESSMENT NOTE - CURRENT MEDICATION
MEDICATIONS  (STANDING):  acetylcysteine 10%  Inhalation 4 milliLiter(s) Inhalation every 6 hours  aMIOdarone    Tablet 200 milliGRAM(s) Oral daily  ascorbic acid 500 milliGRAM(s) Oral daily  aspirin  chewable 81 milliGRAM(s) Oral daily  atorvastatin 80 milliGRAM(s) Oral at bedtime  chlorhexidine 0.12% Liquid 15 milliLiter(s) Oral Mucosa every 12 hours  chlorhexidine 2% Cloths 1 Application(s) Topical daily  chlorhexidine 4% Liquid 1 Application(s) Topical <User Schedule>  dextrose 50% Injectable 50 milliLiter(s) IV Push every 15 minutes  epoetin ignacia (EPOGEN) Injectable 99587 Unit(s) IV Push <User Schedule>  heparin  Infusion 500 Unit(s)/Hr (5 mL/Hr) IV Continuous <Continuous>  insulin lispro (ADMELOG) corrective regimen sliding scale   SubCutaneous every 6 hours  levalbuterol Inhalation 0.63 milliGRAM(s) Inhalation every 8 hours  melatonin 5 milliGRAM(s) Oral at bedtime  methocarbamol 500 milliGRAM(s) Oral every 8 hours  metoprolol tartrate 12.5 milliGRAM(s) Oral <User Schedule>  mirtazapine 7.5 milliGRAM(s) Oral daily  Nephro-elicia 1 Tablet(s) Oral daily  pantoprazole  Injectable 40 milliGRAM(s) IV Push daily  polyethylene glycol 3350 17 Gram(s) Oral two times a day  sodium chloride 0.65% Nasal 1 Spray(s) Both Nostrils every 12 hours  sodium chloride 0.9%. 1000 milliLiter(s) (10 mL/Hr) IV Continuous <Continuous>  traZODone 50 milliGRAM(s) Oral at bedtime  vancomycin  IVPB 1000 milliGRAM(s) IV Intermittent every 24 hours    MEDICATIONS  (PRN):  acetaminophen     Tablet .. 650 milliGRAM(s) Oral every 6 hours PRN Mild Pain (1 - 3)  lidocaine/prilocaine Cream 1 Application(s) Topical daily PRN pre-HD  oxyCODONE    IR 5 milliGRAM(s) Oral every 4 hours PRN Moderate Pain (4 - 6)  oxyCODONE    IR 10 milliGRAM(s) Oral every 4 hours PRN Severe Pain (7 - 10)  sodium chloride 0.9% lock flush 10 milliLiter(s) IV Push every 1 hour PRN Pre/post blood products, medications, blood draw, and to maintain line patency

## 2025-02-10 NOTE — BH CONSULTATION LIAISON ASSESSMENT NOTE - NSBHCHARTREVIEWVS_PSY_A_CORE FT
Vital Signs Last 24 Hrs  T(C): 36.5 (10 Feb 2025 10:55), Max: 37 (10 Feb 2025 00:00)  T(F): 97.7 (10 Feb 2025 10:55), Max: 98.6 (10 Feb 2025 00:00)  HR: 79 (10 Feb 2025 11:00) (56 - 111)  BP: 139/75 (10 Feb 2025 11:00) (106/61 - 164/85)  BP(mean): 99 (10 Feb 2025 11:00) (73 - 120)  RR: 17 (10 Feb 2025 11:00) (10 - 25)  SpO2: 99% (10 Feb 2025 11:00) (95% - 100%)    Parameters below as of 10 Feb 2025 12:00  Patient On (Oxygen Delivery Method): ventilator    O2 Concentration (%): 40

## 2025-02-10 NOTE — PROGRESS NOTE ADULT - ASSESSMENT
54M with PMH of HTN, HLD, ESRD on HD MWF via LUE AVF, ascites (requiring repeat paracenteses q4-6wks), NICM with moderate LV dysfunction w/ EF 35-40%(2023) and CAD s/p atherectomy + SALLIE to D1(4/11/2024) presents to [facility] transferred from [facility] for CABG eval  Nephro on [facility] for HD    ESRD on HD   Regular schedule MWF   Access LUE AVF  Center [facility]  Nephrologist Dr. Bailey  S/p HD on 01/29, 2/3 and 02/05  S/p HD on 02/07 cw  Will attempt 3 L UF with HD today  Keep MAP>65  Renal Diet  Consent in physical chart    Hyper/Hypokalemia  Monitor K, will change dialysate fluid as needed    HTN  bp fluctuating; acceptable  monitor bp     CKD MBD  Can send a PTH  Ca and Phos daily    Anemia  CRISTIANE with HD  Transfuse if hgb <7    CAD with multivessel disease  s/p CABG 12/30  CTICU following    Hypoxic Resp failure requiring Trach  Per surgery  S/p bronchoscopy, pulm following

## 2025-02-10 NOTE — PROGRESS NOTE ADULT - SUBJECTIVE AND OBJECTIVE BOX
Patient is a 54y old  Male who presents with a chief complaint of CAD s/p CABG (10 Feb 2025 06:41)    Being followed by ID for        Interval history:  No other acute events      ROS:  No cough,SOB,CP  No N/V/D  No abd pain  No urinary complaints  No HA  No joint or limb pain  No other complaints    PAST MEDICAL & SURGICAL HISTORY:  Type 2 diabetes mellitus      Hypertension      End stage renal disease  started HD 2/2019 T, Th, Sat via right chest permacath      Bone spur  right shoulder- hx of - sx done      Anemia      Injury of right wrist  hx of at age 14      History of hyperkalemia  before HD- K-6.2 - to repeat in am of sx, pt had HD today      S/P arthroscopy of right shoulder  2010      History of vascular access device  right chest permacath - 2/2019      H/O right wrist surgery  tendons repair - at age 14      AV fistula      H/O ventral hernia repair      S/P cholecystectomy        Allergies    No Known Allergies    Intolerances      Antimicrobials:      MEDICATIONS  (STANDING):  acetylcysteine 10%  Inhalation 4 milliLiter(s) Inhalation every 6 hours  aMIOdarone    Tablet 200 milliGRAM(s) Oral daily  ascorbic acid 500 milliGRAM(s) Oral daily  aspirin  chewable 81 milliGRAM(s) Oral daily  atorvastatin 80 milliGRAM(s) Oral at bedtime  chlorhexidine 0.12% Liquid 15 milliLiter(s) Oral Mucosa every 12 hours  chlorhexidine 2% Cloths 1 Application(s) Topical daily  chlorhexidine 4% Liquid 1 Application(s) Topical <User Schedule>  dextrose 50% Injectable 50 milliLiter(s) IV Push every 15 minutes  epoetin ignacia (EPOGEN) Injectable 83886 Unit(s) IV Push <User Schedule>  heparin  Infusion 500 Unit(s)/Hr (5 mL/Hr) IV Continuous <Continuous>  insulin lispro (ADMELOG) corrective regimen sliding scale   SubCutaneous every 6 hours  levalbuterol Inhalation 0.63 milliGRAM(s) Inhalation every 8 hours  melatonin 5 milliGRAM(s) Oral at bedtime  methocarbamol 500 milliGRAM(s) Oral every 8 hours  metoprolol tartrate 12.5 milliGRAM(s) Oral <User Schedule>  mirtazapine 7.5 milliGRAM(s) Oral daily  Nephro-elicia 1 Tablet(s) Oral daily  pantoprazole  Injectable 40 milliGRAM(s) IV Push daily  polyethylene glycol 3350 17 Gram(s) Oral two times a day  sodium chloride 0.65% Nasal 1 Spray(s) Both Nostrils every 12 hours  sodium chloride 0.9%. 1000 milliLiter(s) (10 mL/Hr) IV Continuous <Continuous>  traZODone 50 milliGRAM(s) Oral at bedtime      Vital Signs Last 24 Hrs  T(C): 36.4 (02-10-25 @ 13:55), Max: 37 (02-10-25 @ 00:00)  T(F): 97.6 (02-10-25 @ 13:55), Max: 98.6 (02-10-25 @ 00:00)  HR: 110 (02-10-25 @ 14:28) (56 - 112)  BP: 145/77 (02-10-25 @ 14:20) (106/61 - 151/108)  BP(mean): 102 (02-10-25 @ 14:20) (73 - 122)  RR: 28 (02-10-25 @ 14:20) (10 - 28)  SpO2: 100% (02-10-25 @ 14:28) (95% - 100%)    Physical Exam:    Constitutional well preserved,comfortable,pleasant    HEENT PERRLA EOMI,No pallor or icterus    No oral exudate or erythema    Neck supple no JVD or LN    Chest Good AE,CTA    CVS RRR S1 S2 WNl No murmur or rub or gallop    Abd soft BS normal No tenderness no masses    Ext No cyanosis clubbing or edema    IV site no erythema tenderness or discharge    Joints no swelling or LOM    CNS AAO X 3 no focal    Lab Data:                          7.8    9.11  )-----------( 216      ( 10 Feb 2025 00:29 )             25.0       02-10    135  |  94[L]  |  38[H]  ----------------------------<  146[H]  5.0   |  25  |  5.93[H]    Ca    8.7      10 Feb 2025 00:29  Phos  4.4     02-10  Mg     2.3     02-10    TPro  6.1  /  Alb  2.8[L]  /  TBili  0.4  /  DBili  x   /  AST  18  /  ALT  11  /  AlkPhos  97  02-10      Urinalysis Basic - ( 10 Feb 2025 00:29 )    Color: x / Appearance: x / SG: x / pH: x  Gluc: 146 mg/dL / Ketone: x  / Bili: x / Urobili: x   Blood: x / Protein: x / Nitrite: x   Leuk Esterase: x / RBC: x / WBC x   Sq Epi: x / Non Sq Epi: x / Bacteria: x                  Vancomycin Level, Trough: 20.9 ug/mL (02-09-25 @ 17:09)  Vancomycin Level, Trough: 24.2 ug/mL (02-08-25 @ 17:02)      WBC Count: 9.11 (02-10-25 @ 00:29)  WBC Count: 8.71 (02-09-25 @ 00:20)  WBC Count: 9.68 (02-08-25 @ 00:39)  WBC Count: 9.48 (02-07-25 @ 00:35)  WBC Count: 9.28 (02-06-25 @ 00:20)  WBC Count: 11.12 (02-05-25 @ 00:19)  WBC Count: 11.39 (02-04-25 @ 00:31)       Bilirubin Total: 0.4 mg/dL (02-10-25 @ 00:29)  Aspartate Aminotransferase (AST/SGOT): 18 U/L (02-10-25 @ 00:29)  Alanine Aminotransferase (ALT/SGPT): 11 U/L (02-10-25 @ 00:29)  Alkaline Phosphatase: 97 U/L (02-10-25 @ 00:29)  Bilirubin Total: 0.4 mg/dL (02-09-25 @ 00:21)  Aspartate Aminotransferase (AST/SGOT): 18 U/L (02-09-25 @ 00:21)  Alanine Aminotransferase (ALT/SGPT): 10 U/L (02-09-25 @ 00:21)  Alkaline Phosphatase: 99 U/L (02-09-25 @ 00:21)  Bilirubin Total: 0.3 mg/dL (02-08-25 @ 00:39)  Aspartate Aminotransferase (AST/SGOT): 17 U/L (02-08-25 @ 00:39)  Alanine Aminotransferase (ALT/SGPT): 10 U/L (02-08-25 @ 00:39)  Alkaline Phosphatase: 107 U/L (02-08-25 @ 00:39)  Bilirubin Total: 0.4 mg/dL (02-07-25 @ 00:34)  Aspartate Aminotransferase (AST/SGOT): 15 U/L (02-07-25 @ 00:34)  Alanine Aminotransferase (ALT/SGPT): 11 U/L (02-07-25 @ 00:34)  Alkaline Phosphatase: 100 U/L (02-07-25 @ 00:34)  Bilirubin Total: 0.4 mg/dL (02-06-25 @ 00:20)  Aspartate Aminotransferase (AST/SGOT): 16 U/L (02-06-25 @ 00:20)  Alanine Aminotransferase (ALT/SGPT): 8 U/L (02-06-25 @ 00:20)  Alkaline Phosphatase: 113 U/L (02-06-25 @ 00:20)         Patient is a 54y old  Male who presents with a chief complaint of CAD s/p CABG (10 Feb 2025 06:41)    Being followed by ID for        Interval history:  pt walked with therapist  uncomfortable in buttocks  breathing stable  No other acute events          PAST MEDICAL & SURGICAL HISTORY:  Type 2 diabetes mellitus      Hypertension      End stage renal disease  started HD 2/2019 T, Th, Sat via right chest permacath      Bone spur  right shoulder- hx of - sx done      Anemia      Injury of right wrist  hx of at age 14      History of hyperkalemia  before HD- K-6.2 - to repeat in am of sx, pt had HD today      S/P arthroscopy of right shoulder  2010      History of vascular access device  right chest permacath - 2/2019      H/O right wrist surgery  tendons repair - at age 14      AV fistula      H/O ventral hernia repair      S/P cholecystectomy        Allergies    No Known Allergies    Intolerances      Antimicrobials:      MEDICATIONS  (STANDING):  acetylcysteine 10%  Inhalation 4 milliLiter(s) Inhalation every 6 hours  aMIOdarone    Tablet 200 milliGRAM(s) Oral daily  ascorbic acid 500 milliGRAM(s) Oral daily  aspirin  chewable 81 milliGRAM(s) Oral daily  atorvastatin 80 milliGRAM(s) Oral at bedtime  chlorhexidine 0.12% Liquid 15 milliLiter(s) Oral Mucosa every 12 hours  chlorhexidine 2% Cloths 1 Application(s) Topical daily  chlorhexidine 4% Liquid 1 Application(s) Topical <User Schedule>  dextrose 50% Injectable 50 milliLiter(s) IV Push every 15 minutes  epoetin ignacia (EPOGEN) Injectable 83348 Unit(s) IV Push <User Schedule>  heparin  Infusion 500 Unit(s)/Hr (5 mL/Hr) IV Continuous <Continuous>  insulin lispro (ADMELOG) corrective regimen sliding scale   SubCutaneous every 6 hours  levalbuterol Inhalation 0.63 milliGRAM(s) Inhalation every 8 hours  melatonin 5 milliGRAM(s) Oral at bedtime  methocarbamol 500 milliGRAM(s) Oral every 8 hours  metoprolol tartrate 12.5 milliGRAM(s) Oral <User Schedule>  mirtazapine 7.5 milliGRAM(s) Oral daily  Nephro-elicia 1 Tablet(s) Oral daily  pantoprazole  Injectable 40 milliGRAM(s) IV Push daily  polyethylene glycol 3350 17 Gram(s) Oral two times a day  sodium chloride 0.65% Nasal 1 Spray(s) Both Nostrils every 12 hours  sodium chloride 0.9%. 1000 milliLiter(s) (10 mL/Hr) IV Continuous <Continuous>  traZODone 50 milliGRAM(s) Oral at bedtime      Vital Signs Last 24 Hrs  T(C): 36.4 (02-10-25 @ 13:55), Max: 37 (02-10-25 @ 00:00)  T(F): 97.6 (02-10-25 @ 13:55), Max: 98.6 (02-10-25 @ 00:00)  HR: 110 (02-10-25 @ 14:28) (56 - 112)  BP: 145/77 (02-10-25 @ 14:20) (106/61 - 151/108)  BP(mean): 102 (02-10-25 @ 14:20) (73 - 122)  RR: 28 (02-10-25 @ 14:20) (10 - 28)  SpO2: 100% (02-10-25 @ 14:28) (95% - 100%)    Physical Exam:    Constitutional wake , alert    Neck trach    Chest Good AE,CTA    CVS  S1 S2   back lesion dried up    Abd soft BS normal No tenderness    Ext No cyanosis clubbing or edema    IV site no erythema tenderness or discharge    Joints no swelling or LOM  vac on buttocks  CNS AAO X 3 left foot drop     Lab Data:                          7.8    9.11  )-----------( 216      ( 10 Feb 2025 00:29 )             25.0       02-10    135  |  94[L]  |  38[H]  ----------------------------<  146[H]  5.0   |  25  |  5.93[H]    Ca    8.7      10 Feb 2025 00:29  Phos  4.4     02-10  Mg     2.3     02-10    TPro  6.1  /  Alb  2.8[L]  /  TBili  0.4  /  DBili  x   /  AST  18  /  ALT  11  /  AlkPhos  97  02-10      Vancomycin Level, Trough: 20.9 ug/mL (02-09-25 @ 17:09)  Vancomycin Level, Trough: 24.2 ug/mL (02-08-25 @ 17:02)      WBC Count: 9.11 (02-10-25 @ 00:29)  WBC Count: 8.71 (02-09-25 @ 00:20)  WBC Count: 9.68 (02-08-25 @ 00:39)  WBC Count: 9.48 (02-07-25 @ 00:35)  WBC Count: 9.28 (02-06-25 @ 00:20)  WBC Count: 11.12 (02-05-25 @ 00:19)  WBC Count: 11.39 (02-04-25 @ 00:31)       Bilirubin Total: 0.4 mg/dL (02-10-25 @ 00:29)  Aspartate Aminotransferase (AST/SGOT): 18 U/L (02-10-25 @ 00:29)  Alanine Aminotransferase (ALT/SGPT): 11 U/L (02-10-25 @ 00:29)  Alkaline Phosphatase: 97 U/L (02-10-25 @ 00:29)    Bilirubin Total: 0.4 mg/dL (02-09-25 @ 00:21)  Aspartate Aminotransferase (AST/SGOT): 18 U/L (02-09-25 @ 00:21)  Alanine Aminotransferase (ALT/SGPT): 10 U/L (02-09-25 @ 00:21)  Alkaline Phosphatase: 99 U/L (02-09-25 @ 00:21)

## 2025-02-10 NOTE — BH CONSULTATION LIAISON ASSESSMENT NOTE - SUMMARY
54 y M , unemployed, domiciled with family, PMHx/ of PMH of HTN, HLD, ESRD on HD MWF via LUE AVF, ascites (requiring repeat paracenteses q4-6wks), NICM with moderate LV dysfunction w/ EF 35-40%(2023) and CAD s/p atherectomy + SALLIE to D1(4/11/2024). Patient has had many medical complications since being admitted. Psychiatry was consulted for feelings of depression stemming from a lack of progress in his current medical conditions. Patient does not exhibit signs of hopelessness or clinical depression but rather appears frustrated and struggling to adjust to the challenges posed by his medical conditions.

## 2025-02-10 NOTE — BH CONSULTATION LIAISON ASSESSMENT NOTE - NSBHCONSULTRECOMMENDOTHER_PSY_A_CORE FT
stop Remeron 7.5mg p.o. at bedtime  adjust trazodone to 200mg p.o. at bedtime, as per patient preference  Please provide appropriate and adequate pain control

## 2025-02-10 NOTE — BH CONSULTATION LIAISON ASSESSMENT NOTE - NSBHCONSULTFOLLOWAFTERCARE_PSY_A_CORE FT
May be referred to a provider in his vicinity. Alternatively, can be referred to [facility] [phone].

## 2025-02-10 NOTE — PROGRESS NOTE ADULT - ASSESSMENT
54M with PMH of HTN, HLD, ESRD on HD MWF via LUE AVF, ascites (requiring repeat paracenteses q4-6wks), NICM with moderate LV dysfunction w/ EF 35-40%() and CAD s/p atherectomy + SALLIE to D1(2024) presents to [facility] transferred from [facility]. Patient initially presented to [facility] ED with shortness of breath. Of note he was recently admitted from - for acute cholecystitis s/p cholecystectomy. In [facility] ED, pt was hypoxic to 86% on RA, trop 1.7K, EKG no new changes, CXR fluid overload. Admitted for AHRF 2/2 fluid overload. Pt received HD on , ,  and . DAPT was resumed, TTE showed improvement in LVEF to 40-45%. Stress test was abnormal with 1mm inferior STD, reversible ischemia in the inferior wall and fixed defects in the lateral, anterior and apical walls with post stress EF of 24%.     Pt was then transferred to [facility] for cardiac cath which showed pLAD 70% ISR, mLCx 70%, D1 severe dz. Patient now transferred to [facility] CTS Dr. Rivers for CABG eval.# CAD s/p NSTEMI  Hx CAD s/p PCI LAD   Presented with ADHF elevated troponins  Positive NST1-Cath- pLAD 70% ISR, mLCx 70%, D1 severe dz.   TTE EF 35% Severe TRWas on Plavix-p2y12- 321 been off Plavix since -POD#1-C3L free LIMA-LAD; SVG-Diag; SEH-ngydXC-Psshzelxu on  Sinus rhythm,Amio for AF prophylaxis  -OOB off  Sinus rhythm   -POD#3-On /pressors-EF 25-30% RV failure with transaminitis-Sinus Pjpxtw59/03-POD#4-Was intubated for hypoxia-gradual hypotension on /pressors had IABP inserted this morning  -POD#5-s/p IABP insertion, sepsis/septic shock due to GNR/ECOLI HD cath placed   Bronched yesterday 1/3 - minimal secretions with rust-tinged sputum   ESBL-E Coli bacteremia on meropenam    - POD#7 Atrial Fib ,remains intubated weaning IABP 1:3,off pressors on CRRT  -IABP removed.Remains on vent-Alex 10ppm,off Levo- CVP 10 / MAP 71 / Hct 25.6% / Lactate 1.7-Atrial Fibrillation  -Intubated on Alex 10ppm, gtt, BP better-AF~~90's on Amio-had bronch still febrile Tmax 35628/-Extubated awake alert on HFNC AF,on Dobutrex-CRRT,Cultures no growth    01/10-OOB on BiPAP Sinus Rhythm on -SR/ MAP 57/ CVP 10/  Hct 26.7 / Lact 1.4  -s/p EDU/DCCV-Sinus rhythm on -SR / MAP 52 / CVP 12/ Hct 25.8 / Lact 0.7-had bronch with BAL Left lung  -Sinus rhythm on -for repeat Bronch-SR / MAP 67 / CVP 11 / Hct 27.4% / Lact 0.8  -s/p Trach on  TTE EF 55%-SR / CVP 2 / MAP 63 / Hct 25.9% / Lactate 0.9  -Awake on Trach collar off  c/o buttock pain had bronch yesterday-BAL-Sinus rhythm  -Sinus rhythm on vent at night-BP stable may need to increase hydrallazine 25 mg q8  -TTE EF 60% still needs Vent support at night Bradycardic trial of Bryce now back on   -Awake alert Sinus rhythm BP elevated  remains on ,Nocturnal vent support  resume Hydralazine 25mg q8  -Reverted to AF rvr  -s/p BRonc/BAL debridement sacral decub  -Awake noted AF RVR no further bradyarrhythmias-Started BBlockers  02/10-Persistent AF tolerating Amio BBlockers Pericardial Effusion decreased consider resuming AC      # Acute on Chronic Systolic Heart Failure now improved  EF-35% ,severe TR  Had HD post op  GDMT post op  LVEF improved post bypass but now at 23-30%-BiV dysfunction on  now off pressors  -TTE EF 40%,trace effusion  ECG c/w pericarditis-was on colchicine   01/15-TTE EF 55%  -TTE EF 60%   -Normal LV systolic function Moderate pericardial effusion  -On TC-HF and Vent support at nights   02/10-Vent HS with T Collar trial Ambulated Remains in AF  Repeat TTE Normal LV Systolic function Small Pericardial effusion decreased from 2/3        Vascular evaluated  AVGraft /?steal causing RV failure-'' Very low suspicion of steal Sd and AVF causing current clinical state. ''   VA Duplex Hemodialysis Access, Left (25 @ 13:35) >   Arterial flow volume of 1301 mL/m, and the venous flow volume of  1492 mL/m are consistent with a normally functioning dialysis access  fistula.      # Ascites  Hx chronic ascites  Has drainage q4-6 weeks  Last drained -4600mL  ? ascites related to severe TR  Had kavtiarfwvmn-Uwthcas-sk growth   CT Abdomen 01/10-Large volume ascites-  US abdomen--Small to moderate volume abdominal/pelvic ascites      # ESRD  Now off CRRT transition to HD

## 2025-02-10 NOTE — BH CONSULTATION LIAISON ASSESSMENT NOTE - NSBHCHARTREVIEWINVESTIGATE_PSY_A_CORE FT
< from: 12 Lead ECG (02.04.25 @ 16:00) >      Ventricular Rate 85 BPM    Atrial Rate 85 BPM    QRS Duration 114 ms    Q-T Interval 394 ms    QTC Calculation(Bazett) 468 ms      < end of copied text >

## 2025-02-10 NOTE — PROGRESS NOTE ADULT - SUBJECTIVE AND OBJECTIVE BOX
[facility]    Dr. Nica Styles     Office [phone] (9 am to 5 pm)  Service: [phone] (5pm to 9am)  Also Available on TEAMS      RENAL PROGRESS NOTE: DATE OF SERVICE 02-10-25 @ 09:30    Patient is a 54y old  Male who presents with a chief complaint of CAD s/p CABG (10 Feb 2025 06:41)      Patient seen and examined at bedside. No chest pain/sob    VITALS:  T(F): 97.2 (02-10-25 @ 08:00), Max: 98.6 (02-10-25 @ 00:00)  HR: 57 (02-10-25 @ 08:00)  BP: 130/65 (02-10-25 @ 08:00)  RR: 11 (02-10-25 @ 08:00)  SpO2: 98% (02-10-25 @ 08:00)  Wt(kg): --    02-09 @ 07:01  -  02-10 @ 07:00  --------------------------------------------------------  IN: 1732 mL / OUT: 0 mL / NET: 1732 mL    02-10 @ 07:01  -  02-10 @ 09:30  --------------------------------------------------------  IN: 125 mL / OUT: 0 mL / NET: 125 mL          PHYSICAL EXAM:  Constitutional: NAD  Neck: No JVD  Respiratory: CTAB, no wheezes, rales or rhonchi  Cardiovascular: S1, S2, RRR  Gastrointestinal: BS+, soft, NT/ND  Extremities: No peripheral edema    Hospital Medications:   MEDICATIONS  (STANDING):  acetylcysteine 10%  Inhalation 4 milliLiter(s) Inhalation every 6 hours  aMIOdarone    Tablet 200 milliGRAM(s) Oral daily  ascorbic acid 500 milliGRAM(s) Oral daily  aspirin  chewable 81 milliGRAM(s) Oral daily  atorvastatin 80 milliGRAM(s) Oral at bedtime  chlorhexidine 0.12% Liquid 15 milliLiter(s) Oral Mucosa every 12 hours  chlorhexidine 2% Cloths 1 Application(s) Topical daily  chlorhexidine 4% Liquid 1 Application(s) Topical <User Schedule>  dextrose 50% Injectable 50 milliLiter(s) IV Push every 15 minutes  epoetin ignacia (EPOGEN) Injectable 56637 Unit(s) IV Push <User Schedule>  heparin   Injectable 5000 Unit(s) SubCutaneous every 8 hours  insulin lispro (ADMELOG) corrective regimen sliding scale   SubCutaneous every 6 hours  levalbuterol Inhalation 0.63 milliGRAM(s) Inhalation every 8 hours  melatonin 5 milliGRAM(s) Oral at bedtime  methocarbamol 500 milliGRAM(s) Oral every 8 hours  metoprolol tartrate 12.5 milliGRAM(s) Oral every 12 hours  mirtazapine 7.5 milliGRAM(s) Oral daily  Nephro-elicia 1 Tablet(s) Oral daily  oxyCODONE    IR 10 milliGRAM(s) Oral once  pantoprazole  Injectable 40 milliGRAM(s) IV Push daily  polyethylene glycol 3350 17 Gram(s) Oral two times a day  sodium chloride 0.65% Nasal 1 Spray(s) Both Nostrils every 12 hours  sodium chloride 0.9%. 1000 milliLiter(s) (10 mL/Hr) IV Continuous <Continuous>  traZODone 50 milliGRAM(s) Oral at bedtime  vancomycin  IVPB 1000 milliGRAM(s) IV Intermittent every 24 hours      LABS:  02-10    135  |  94[L]  |  38[H]  ----------------------------<  146[H]  5.0   |  25  |  5.93[H]    Ca    8.7      10 Feb 2025 00:29  Phos  4.4     02-10  Mg     2.3     02-10    TPro  6.1  /  Alb  2.8[L]  /  TBili  0.4  /  DBili      /  AST  18  /  ALT  11  /  AlkPhos  97  02-10    Creatinine Trend: 5.93 <--, 5.13 <--, 4.28 <--, 4.37 <--, 4.45 <--, 5.10 <--, 4.58 <--, 4.12 <--, 5.74 <--    Albumin: 2.8 g/dL (02-10 @ 00:29)  Phosphorus: 4.4 mg/dL (02-10 @ 00:29)                              7.8    9.11  )-----------( 216      ( 10 Feb 2025 00:29 )             25.0     Urine Studies:  Urinalysis - [02-10-25 @ 00:29]      Color  / Appearance  / SG  / pH       Gluc 146 / Ketone   / Bili  / Urobili        Blood  / Protein  / Leuk Est  / Nitrite       RBC  / WBC  / Hyaline  / Gran  / Sq Epi  / Non Sq Epi  / Bacteria       Iron 29, TIBC 143, %sat 20      [12-19-24 @ 05:00]  Ferritin 904      [12-19-24 @ 05:00]  TSH 4.75      [12-24-24 @ 06:50]        RADIOLOGY & ADDITIONAL STUDIES:

## 2025-02-10 NOTE — BH CONSULTATION LIAISON ASSESSMENT NOTE - NSBHATTESTCOMMENTATTENDFT_PSY_A_CORE
This is a 54-y.o. HM patient, , unemployed, domiciled with family, with no known PPH and with PMH of PMH of HTN, HLD, ESRD on HD MWF via LUE AVF, ascites (requiring repeat paracenteses q4-6wks), NICM with moderate LV dysfunction w/ EF 35-40%(2023) and CAD s/p atherectomy + SALLIE to D1(4/11/2024). Patient has had many medical complications since being admitted. Psychiatry was consulted for feelings of depression stemming from a lack of progress in his current medical conditions.     I have seen and evaluated this patient myself. Chart, labs, meds reviewed. I agree with trainee's assessment and plan. Patient does not exhibit signs of hopelessness or clinical depression but rather appears frustrated and struggling to adjust to the challenges posed by his medical conditions. Please adjust meds as suggested, provide adequate pain control.

## 2025-02-10 NOTE — CONSULT NOTE ADULT - SUBJECTIVE AND OBJECTIVE BOX
Thoracic Surgery Consult  Consulting surgical team: CTS  Consulting attending: Tita NICHOLS/Myra NICHOLS    HPI:  54M with PMH of HTN, HLD, ESRD on HD MWF via LUE AVF, ascites (requiring repeat paracenteses q4-6wks), NICM with moderate LV dysfunction w/ EF 35-40%(2023) and CAD s/p atherectomy + SALLIE to D1(4/11/2024) presents to [facility] transferred from [facility] complaining of SOB s/p LHC showing pLAD 70% ISR, mLCx 70%, D1 severe dz.  Now s/p CABGx3 with Dr. Rivers 12/30. Course complicated by acute respiratory distress and PNA requiring multiple intubations and bronchs for left lower lobe collapse and PNA, s/p perc trach 1/16 by general surgery. Given no progress in left sided ventilation and clearance, evaluation for concern for diaphragmatic paralysis. Thoracic surgery consulted for evaluation for diaphragmatic plication. Bedside M mode ultrasound of left diaphragm attempted but patient non-compliant.       PAST MEDICAL HISTORY:  Type 2 diabetes mellitus    Hypertension    HLD (hyperlipidemia)    End stage renal disease    Bone spur    Anemia    Injury of right wrist    History of hyperkalemia    No pertinent past medical history        PAST SURGICAL HISTORY:  S/P arthroscopy of right shoulder    History of vascular access device    H/O right wrist surgery    AV fistula    H/O ventral hernia repair    S/P cholecystectomy        MEDICATIONS:  acetaminophen     Tablet .. 650 milliGRAM(s) Oral every 6 hours PRN  acetylcysteine 10%  Inhalation 4 milliLiter(s) Inhalation every 6 hours  aMIOdarone    Tablet 200 milliGRAM(s) Oral daily  ascorbic acid 500 milliGRAM(s) Oral daily  aspirin  chewable 81 milliGRAM(s) Oral daily  atorvastatin 80 milliGRAM(s) Oral at bedtime  chlorhexidine 0.12% Liquid 15 milliLiter(s) Oral Mucosa every 12 hours  chlorhexidine 2% Cloths 1 Application(s) Topical daily  chlorhexidine 4% Liquid 1 Application(s) Topical <User Schedule>  dextrose 50% Injectable 50 milliLiter(s) IV Push every 15 minutes  epoetin ignacia (EPOGEN) Injectable 21510 Unit(s) IV Push <User Schedule>  heparin  Infusion 500 Unit(s)/Hr IV Continuous <Continuous>  insulin lispro (ADMELOG) corrective regimen sliding scale   SubCutaneous every 6 hours  levalbuterol Inhalation 0.63 milliGRAM(s) Inhalation every 8 hours  lidocaine/prilocaine Cream 1 Application(s) Topical daily PRN  melatonin 5 milliGRAM(s) Oral at bedtime  methocarbamol 500 milliGRAM(s) Oral every 8 hours  metoprolol tartrate 12.5 milliGRAM(s) Oral <User Schedule>  mirtazapine 7.5 milliGRAM(s) Oral daily  Nephro-elicia 1 Tablet(s) Oral daily  oxyCODONE    IR 5 milliGRAM(s) Oral every 4 hours PRN  oxyCODONE    IR 10 milliGRAM(s) Oral every 4 hours PRN  pantoprazole  Injectable 40 milliGRAM(s) IV Push daily  polyethylene glycol 3350 17 Gram(s) Oral two times a day  sodium chloride 0.65% Nasal 1 Spray(s) Both Nostrils every 12 hours  sodium chloride 0.9% lock flush 10 milliLiter(s) IV Push every 1 hour PRN  sodium chloride 0.9%. 1000 milliLiter(s) IV Continuous <Continuous>  traZODone 50 milliGRAM(s) Oral at bedtime  vancomycin  IVPB 1000 milliGRAM(s) IV Intermittent every 24 hours      ALLERGIES:  No Known Allergies      VITALS & I/Os:  Vital Signs Last 24 Hrs  T(C): 36.2 (10 Feb 2025 08:00), Max: 37 (10 Feb 2025 00:00)  T(F): 97.2 (10 Feb 2025 08:00), Max: 98.6 (10 Feb 2025 00:00)  HR: 57 (10 Feb 2025 10:00) (56 - 111)  BP: 134/72 (10 Feb 2025 10:00) (106/61 - 165/79)  BP(mean): 97 (10 Feb 2025 10:00) (73 - 120)  RR: 14 (10 Feb 2025 10:00) (10 - 25)  SpO2: 100% (10 Feb 2025 10:00) (95% - 100%)    Parameters below as of 10 Feb 2025 08:00  Patient On (Oxygen Delivery Method): ventilator    O2 Concentration (%): 40  Mode: CPAP with PS  FiO2: 40  PEEP: 10  PS: 15  MAP: 11  PIP: 24    I&O's Summary    09 Feb 2025 07:01  -  10 Feb 2025 07:00  --------------------------------------------------------  IN: 1732 mL / OUT: 0 mL / NET: 1732 mL    10 Feb 2025 07:01  -  10 Feb 2025 10:54  --------------------------------------------------------  IN: 235 mL / OUT: 0 mL / NET: 235 mL        PHYSICAL EXAM:  General: No acute distress  Respiratory: Nonlabored, decreased ausculation on b/l bases, trach in place  Cardiovascular: RRR  Abdominal: Soft, nondistended, nontender.   Extremities: Warm    LABS:                        7.8    9.11  )-----------( 216      ( 10 Feb 2025 00:29 )             25.0     02-10    135  |  94[L]  |  38[H]  ----------------------------<  146[H]  5.0   |  25  |  5.93[H]    Ca    8.7      10 Feb 2025 00:29  Phos  4.4     02-10  Mg     2.3     02-10    TPro  6.1  /  Alb  2.8[L]  /  TBili  0.4  /  DBili  x   /  AST  18  /  ALT  11  /  AlkPhos  97  02-10    Lactate:    PT/INR - ( 10 Feb 2025 00:29 )   PT: 15.2 sec;   INR: 1.33 ratio         PTT - ( 10 Feb 2025 00:29 )  PTT:34.6 sec          Urinalysis Basic - ( 10 Feb 2025 00:29 )    Color: x / Appearance: x / SG: x / pH: x  Gluc: 146 mg/dL / Ketone: x  / Bili: x / Urobili: x   Blood: x / Protein: x / Nitrite: x   Leuk Esterase: x / RBC: x / WBC x   Sq Epi: x / Non Sq Epi: x / Bacteria: x        IMAGING:

## 2025-02-10 NOTE — BH CONSULTATION LIAISON ASSESSMENT NOTE - NSBHCHARTREVIEWLAB_PSY_A_CORE FT
7.8    9.11  )-----------( 216      ( 10 Feb 2025 00:29 )             25.0     02-10    135  |  94[L]  |  38[H]  ----------------------------<  146[H]  5.0   |  25  |  5.93[H]    Ca    8.7      10 Feb 2025 00:29  Phos  4.4     02-10  Mg     2.3     02-10    TPro  6.1  /  Alb  2.8[L]  /  TBili  0.4  /  DBili  x   /  AST  18  /  ALT  11  /  AlkPhos  97  02-10    Urinalysis Basic - ( 10 Feb 2025 00:29 )    Color: x / Appearance: x / SG: x / pH: x  Gluc: 146 mg/dL / Ketone: x  / Bili: x / Urobili: x   Blood: x / Protein: x / Nitrite: x   Leuk Esterase: x / RBC: x / WBC x   Sq Epi: x / Non Sq Epi: x / Bacteria: x

## 2025-02-10 NOTE — CONSULT NOTE ADULT - ASSESSMENT
54M with PMH of HTN, HLD, ESRD on HD MWF via LUE AVF, ascites (requiring repeat paracenteses q4-6wks), NICM with moderate LV dysfunction w/ EF 35-40%(2023) and CAD s/p atherectomy + SALLIE to D1(4/11/2024)with severe MVD s/p CABGx3 with Dr. Rivers 12/30. Course complicated by acute respiratory distress and PNA requiring multiple intubations and bronchs for left lower lobe collapse and PNA, s/p perc trach 1/16 by general surgery.     Thoracic surgery consulted for evaluation for diaphragmatic plication. Bedside M mode ultrasound of left diaphragm attempted but patient non-compliant.     Plan:  -Plan to be reviewed and discussed with attending, Dr. Kaylen Johnson MD PGY-4  Thoracic Surgery   [id number] 54M with PMH of HTN, HLD, ESRD on HD MWF via LUE AVF, ascites (requiring repeat paracenteses q4-6wks), NICM with moderate LV dysfunction w/ EF 35-40%(2023) and CAD s/p atherectomy + SALLIE to D1(4/11/2024)with severe MVD s/p CABGx3 with Dr. Rivers 12/30. Course complicated by acute respiratory distress and PNA requiring multiple intubations and bronchs for left lower lobe collapse and PNA, s/p perc trach 1/16 by general surgery.     Thoracic surgery consulted for evaluation for diaphragmatic plication. Bedside M mode ultrasound of left diaphragm attempted but patient non-compliant.     Plan:  - Please obtain formal sniff test to reassess diaphragm function  - will revaluate for candidacy for left diaphragm plication; preoperative pulmonary function depressed so may represent chronic status    Plan discussed with and approved by attending Dr. Kaylen Johnson MD PGY-4  Thoracic Surgery   [id number]

## 2025-02-10 NOTE — PROGRESS NOTE ADULT - ASSESSMENT
54M with HTN, HLD, ESRD on HD MWF via LUE AVF, ascites (requiring repeat paracenteses q4-6wks), NICM with moderate LV dysfunction w/ EF 35-40%() and CAD s/p atherectomy + SALLIE to D1 (2024) presents to [facility] 2024 as transfer from [facility] (via [facility]) where he presented  with SOB.   In [facility] ED, pt was hypoxic to 86% on RA, trop 1.7K, EKG no new changes, CXR fluid overload. Admitted for AHRF 2/2 fluid overload. Pt received HD on , ,  and . DAPT was resumed, TTE showed improvement in LVEF to 40-45%. Stress test was abnormal with 1mm inferior STD, reversible ischemia in the inferior wall and fixed defects in the lateral, anterior and apical walls with post stress EF of 24%. Pt was then transferred to [facility] for cardiac cath which showed pLAD 70% ISR, mLCx 70%, D1 severe dz. Patient now transferred to [facility] 2024 for CABG.       - underwent  C3L free LIMA-LAD; SVG-Diag; SVG-leftPL   - extubated   - hypotension and ECHO showing Systolic HF/depressed BIV Function, currently supported with /pressors.  Labs this evening showing transaminitis   - hypotensive, febrile and reintubated  1/3 - cultures (+) E coli +ESBL bacteremia   - underwent tracheostomy   - left lung collapse s/p bronchoscopy   - seen by plastic surgery / colorectal for sacral wound, no surgical intervention, no evidence of fistula, BC sent  - c/o right arm  pain, started on acyclovir for positive HSV PCR     doing a little better tissue culture with serratia; bronch also growing some yeast and VREC   back with nodule with some discharge    sacral decubitus less tender, some bloody secretions from trach  - still with drainage from back lesion  s/p I & D of lesion and sacrum   noted left foot numness , foot drop   2/10 - completed abs for back- dried up  local care for decubitus    Recommendations  - completed linezolid course  -completed meropenem  completed abs for back   - surgical f/u appreciated   - yeast in bronch, not clear if true infection, monitor off antifungal for now  - acyclovir completed  - check cultures from bronch  neuro input appreciated  - local care for sacral decubitus, more comfortable       Marla Champion M.D. ,   please reach via teams   If no answer, or after 5PM/ weekends,  then please call  [phone]    Assessment and plan discussed with the primary team .                 54M with HTN, HLD, ESRD on HD MWF via LUE AVF, ascites (requiring repeat paracenteses q4-6wks), NICM with moderate LV dysfunction w/ EF 35-40%() and CAD s/p atherectomy + SALLIE to D1 (2024) presents to [facility] 2024 as transfer from [facility] (via [facility]) where he presented  with SOB.   In [facility] ED, pt was hypoxic to 86% on RA, trop 1.7K, EKG no new changes, CXR fluid overload. Admitted for AHRF 2/2 fluid overload. Pt received HD on , ,  and . DAPT was resumed, TTE showed improvement in LVEF to 40-45%. Stress test was abnormal with 1mm inferior STD, reversible ischemia in the inferior wall and fixed defects in the lateral, anterior and apical walls with post stress EF of 24%. Pt was then transferred to [facility] for cardiac cath which showed pLAD 70% ISR, mLCx 70%, D1 severe dz. Patient now transferred to [facility] 2024 for CABG.       - underwent  C3L free LIMA-LAD; SVG-Diag; SVG-leftPL   - extubated   - hypotension and ECHO showing Systolic HF/depressed BIV Function, currently supported with /pressors.  Labs this evening showing transaminitis   - hypotensive, febrile and reintubated  1/3 - cultures (+) E coli +ESBL bacteremia   - underwent tracheostomy   - left lung collapse s/p bronchoscopy   - seen by plastic surgery / colorectal for sacral wound, no surgical intervention, no evidence of fistula, BC sent  - c/o right arm  pain, started on acyclovir for positive HSV PCR     doing a little better tissue culture with serratia; bronch also growing some yeast and VREC   back with nodule with some discharge    sacral decubitus less tender, some bloody secretions from trach  - still with drainage from back lesion  s/p I & D of lesion and sacrum   noted left foot numness , foot drop   2/10 - completed abs for back- dried up  local care for decubitus. thoracic surgery to assess left diaphragm     Recommendations  - completed linezolid course  -completed meropenem  completed abs for back   - surgical f/u appreciated   - yeast in bronch, not clear if true infection, monitor off antifungal for now  - acyclovir completed  - check cultures from bronch  neuro input appreciated  - local care for sacral decubitus, more comfortable       Marla Champion M.D. ,   please reach via teams   If no answer, or after 5PM/ weekends,  then please call  [phone]    Assessment and plan discussed with the primary team .

## 2025-02-10 NOTE — PROGRESS NOTE ADULT - SUBJECTIVE AND OBJECTIVE BOX
Brief history :   55 yo M with multiple medical problems including CAD s/p PCI in 2024 s/p CABG x 3 on 24  1/: Intubated for hypoxia & left lung white out s/p awake bronch with removal of copious mucopurulent secretions  : s/p IABP insertion, sepsis/septic shock due to E coli   ESBL pneumonia, collapsed Left Lung, s/p multiple bronch    tracheostomy tube placement    VRE E. Faecium Bcx   +HSV PCR BAL   tissue cx from back abscess - Serratia   - - intermittent bradycardia/junctional episodes with hypotension  2/3: off , off theophylline. iHD 1L UF  : bronch for Left lung collapse wound vac, 2decho w/ moderate pericardial effusion - similar as before, US abd: small - moderate ascites - monitor at this time.  Wound vac placed for sacral wounds  : iHD-1L UF  : bronch today off positive pressure. : MRSA and serratia from cyst on the back. Plan for Levaquin + Vanc x 5 days      24 Hour events:  Limited trach collar trials.  Ambulating well.      ICU Vital Signs Last 24 Hrs  T(C): 36.2 (02-10-25 @ 08:00), Max: 37 (02-10-25 @ 00:00)  T(F): 97.2 (02-10-25 @ 08:00), Max: 98.6 (02-10-25 @ 00:00)  HR: 57 (02-10-25 @ 08:00) (56 - 111)  BP: 130/65 (02-10-25 @ 08:00) (106/61 - 165/79)  BP(mean): 89 (02-10-25 @ 08:00) (73 - 120)  ABP: --  ABP(mean): --  RR: 11 (02-10-25 @ 08:00) (10 - )  SpO2: 98% (02-10-25 @ 08:00) (95% - 100%)    I&O's Summary    2025 07:01  -  10 Feb 2025 07:00  --------------------------------------------------------  IN: 1732 mL / OUT: 0 mL / NET: 1732 mL    10 Feb 2025 07:01  -  10 Feb 2025 08:39  --------------------------------------------------------  IN: 125 mL / OUT: 0 mL / NET: 125 mL      Mode: CPAP with PS  FiO2: 40  PEEP: 10  PS: 15  MAP: 12  PIP: 24                                7.8    9.11  )-----------( 216      ( 10 Feb 2025 00:29 )             25.0     10 Feb 2025 00:29    135    |  94     |  38     ----------------------------<  146    5.0     |  25     |  5.93     Ca    8.7        10 Feb 2025 00:29  Phos  4.4       10 Feb 2025 00:29  Mg     2.3       10 Feb 2025 00:29    TPro  6.1    /  Alb  2.8    /  TBili  0.4    /  DBili  x      /  AST  18     /  ALT  11     /  AlkPhos  97     10 Feb 2025 00:29    PT/INR - ( 10 Feb 2025 00:29 )   PT: 15.2 sec;   INR: 1.33 ratio         PTT - ( 10 Feb 2025 00:29 )  PTT:34.6 sec      MEDICATIONS  (STANDING):  acetylcysteine 10%  Inhalation 4 milliLiter(s) Inhalation every 6 hours  aMIOdarone    Tablet 200 milliGRAM(s) Oral daily  ascorbic acid 500 milliGRAM(s) Oral daily  aspirin  chewable 81 milliGRAM(s) Oral daily  atorvastatin 80 milliGRAM(s) Oral at bedtime  chlorhexidine 0.12% Liquid 15 milliLiter(s) Oral Mucosa every 12 hours  chlorhexidine 2% Cloths 1 Application(s) Topical daily  chlorhexidine 4% Liquid 1 Application(s) Topical <User Schedule>  dextrose 50% Injectable 50 milliLiter(s) IV Push every 15 minutes  epoetin ignacia (EPOGEN) Injectable 05682 Unit(s) IV Push <User Schedule>  heparin   Injectable 5000 Unit(s) SubCutaneous every 8 hours  insulin lispro (ADMELOG) corrective regimen sliding scale   SubCutaneous every 6 hours  levalbuterol Inhalation 0.63 milliGRAM(s) Inhalation every 8 hours  melatonin 5 milliGRAM(s) Oral at bedtime  methocarbamol 500 milliGRAM(s) Oral every 8 hours  metoprolol tartrate 12.5 milliGRAM(s) Oral every 12 hours  mirtazapine 7.5 milliGRAM(s) Oral daily  Nephro-elicia 1 Tablet(s) Oral daily  pantoprazole  Injectable 40 milliGRAM(s) IV Push daily  polyethylene glycol 3350 17 Gram(s) Oral two times a day  sodium chloride 0.65% Nasal 1 Spray(s) Both Nostrils every 12 hours  sodium chloride 0.9%. 1000 milliLiter(s) IV Continuous <Continuous>  traZODone 50 milliGRAM(s) Oral at bedtime  vancomycin  IVPB 1000 milliGRAM(s) IV Intermittent every 24 hours    Home Medications:  aspirin 81 mg oral delayed release tablet: 1 tab(s) orally once a day (23 Dec 2024 16:58)  calcium acetate 667 mg oral tablet: 1 tab(s) orally 3 times a day (23 Dec 2024 16:58)  carvedilol 12.5 mg oral tablet: 1 tab(s) orally every 12 hours (23 Dec 2024 16:56)  clopidogrel 75 mg oral tablet: 1 tab(s) orally once a day (23 Dec 2024 16:56)  furosemide 20 mg oral tablet: 1 tab(s) orally once a day (23 Dec 2024 16:58)  hydrALAZINE 25 mg oral tablet: 1 tab(s) orally 3 times a day (23 Dec 2024 16:58)  lisinopril 40 mg oral tablet: 1 tab(s) orally once a day (23 Dec 2024 16:58)  lovastatin 10 mg oral tablet: 1 tab(s) orally once a day (23 Dec 2024 16:56)  NIFEdipine 90 mg oral tablet, extended release: 1 tab(s) orally once a day (23 Dec 2024 16:58)  Emani-Elicia oral tablet: 1 tab(s) orally once a day (23 Dec 2024 16:58)  traZODone 100 mg oral tablet: 1 tab(s) orally once a day (at bedtime) (23 Dec 2024 16:56)    PHYSICAL EXAM:  Gen : no acute distress  Neck: No LAD, No JVD  Respiratory: decreased in the bases  Cardiovascular: S1 and S2, RRR, no M/G/R  Gastrointestinal: BS+, soft, NT/ND  Extremities: No peripheral edema  Vascular: 2+ peripheral pulses  Neurological: A/O x 3, no focal deficits  Incision: clean dry/ no sign of infection  Lines: no sign of infection      S/p   Acute post operative respiratory insufficiency  Acute blood loss anemia/Thrombocytopenia  Electrolyte abnormalities - Hypomag/Hypokalcemia  Hypotension   Cardiogenic shock  Post operative pain       Neuro  Neuro assessment  Sedation   Multimodal pain management    CVS   s/p - CABG x 3 on 24  EF- 55-60%  PO Amiodarone for afib prophylaxis   ASA/ Statin   BB for rate control   Chest tube management    Pulm   Ventilator weaning as tolerated   Fast track extubation     GI   On TF's for now   GI proph/Bowel regimen       Monitor UOP  Nephro-elicia   Correct Electrolytes K >4.0-4.5 Mag 2.0-2.5    Endo   A1c  Glycemic control per protocol Glucose <180  Thyroid     Heme   Correct coagulapathy, monitor for bleeding  DVT proph with HepSubQ  Acute blood loss anemia expected post operative loss - IV Fe, Epo    ID   IV Vanco for postop prophylaxis         Care plan discussed with the ICU care team.   Patient remains critical, at risk for life threatening decompensation.    I have spent 30 minutes providing critical care management to this patient.    By signing my name below, I, Sonia Sánchez, attest that this documentation has been prepared under the direction and in the presence of Herminio Mendez MD.   Electronically signed: Sonia Sánchez, 02-10-25 @ 08:39    I, Herminio Mendez, personally performed the services described in this documentation. all medical record entries made by the scribe were at my direction and in my presence. I have reviewed the chart and agree that the record reflects my personal performance and is accurate and complete  Electronically signed: Herminio Mendez MD.    Brief history :   55 yo M with multiple medical problems including CAD s/p PCI in 2024 s/p CABG x 3 on 24  1: Intubated for hypoxia & left lung white out s/p awake bronch with removal of copious mucopurulent secretions  : s/p IABP insertion, sepsis/septic shock due to E coli   ESBL pneumonia, collapsed Left Lung, s/p multiple bronch    tracheostomy tube placement    VRE E. Faecium Bcx   +HSV PCR BAL   tissue cx from back abscess - Serratia   - - intermittent bradycardia/junctional episodes with hypotension  2/3: off , off theophylline. iHD 1L UF  : bronch for Left lung collapse wound vac, 2decho w/ moderate pericardial effusion - similar as before, US abd: small - moderate ascites - monitor at this time.  Wound vac placed for sacral wounds  : iHD-1L UF  : bronch today off positive pressure. : MRSA and serratia from cyst on the back. Plan for Levaquin + Vanc x 5 days   : concern for left food drop   2/10 : no acute issues - CXR shows improving left sided aeration, pt subjectively reports having difficulty while lying flat    ICU Vital Signs Last 24 Hrs  T(C): 36.2 (02-10-25 @ 08:00), Max: 37 (02-10-25 @ 00:00)  T(F): 97.2 (02-10-25 @ 08:00), Max: 98.6 (02-10-25 @ 00:00)  HR: 57 (02-10-25 @ 08:00) (56 - 111)  BP: 130/65 (02-10-25 @ 08:00) (106/61 - 165/79)  BP(mean): 89 (02-10-25 @ 08:00) (73 - 120)  RR: 11 (02-10-25 @ 08:00) (10 - )  SpO2: 98% (02-10-25 @ 08:00) (95% - 100%)    I&O's Summary    2025 07:01  -  10 Feb 2025 07:00  --------------------------------------------------------  IN: 1732 mL / OUT: 0 mL / NET: 1732 mL    10 Feb 2025 07:01  -  10 Feb 2025 08:39  --------------------------------------------------------  IN: 125 mL / OUT: 0 mL / NET: 125 mL      Mode: CPAP with PS  FiO2: 40  PEEP: 10  PS: 15  MAP: 12  PIP: 24                        7.8    9.11  )-----------( 216      ( 10 Feb 2025 00:29 )             25.0     10 Feb 2025 00:29    135    |  94     |  38     ----------------------------<  146    5.0     |  25     |  5.93     Ca    8.7        10 Feb 2025 00:29  Phos  4.4       10 Feb 2025 00:29  Mg     2.3       10 Feb 2025 00:29    TPro  6.1    /  Alb  2.8    /  TBili  0.4    /  DBili  x      /  AST  18     /  ALT  11     /  AlkPhos  97     10 Feb 2025 00:29    PT/INR - ( 10 Feb 2025 00:29 )   PT: 15.2 sec;   INR: 1.33 ratio     PTT - ( 10 Feb 2025 00:29 )  PTT:34.6 sec      MEDICATIONS  (STANDING):  acetylcysteine 10%  Inhalation 4 milliLiter(s) Inhalation every 6 hours  aMIOdarone    Tablet 200 milliGRAM(s) Oral daily  ascorbic acid 500 milliGRAM(s) Oral daily  aspirin  chewable 81 milliGRAM(s) Oral daily  atorvastatin 80 milliGRAM(s) Oral at bedtime  epoetin ignacia (EPOGEN) Injectable 65961 Unit(s) IV Push <User Schedule>  heparin   Injectable 5000 Unit(s) SubCutaneous every 8 hours  insulin lispro (ADMELOG) corrective regimen sliding scale   SubCutaneous every 6 hours  levalbuterol Inhalation 0.63 milliGRAM(s) Inhalation every 8 hours  melatonin 5 milliGRAM(s) Oral at bedtime  methocarbamol 500 milliGRAM(s) Oral every 8 hours  metoprolol tartrate 12.5 milliGRAM(s) Oral every 12 hours  mirtazapine 7.5 milliGRAM(s) Oral daily  Nephro-elicia 1 Tablet(s) Oral daily  pantoprazole  Injectable 40 milliGRAM(s) IV Push daily  polyethylene glycol 3350 17 Gram(s) Oral two times a day  traZODone 50 milliGRAM(s) Oral at bedtime  vancomycin  IVPB 1000 milliGRAM(s) IV Intermittent every 24 hours      PHYSICAL EXAM:  Gen : no acute distress  Neck: + trach   Respiratory: decreased in the bases  Cardiovascular: S1 and S2, RRR, no M/G/R  Gastrointestinal: distended + ascites   Extremities: No peripheral edema  Vascular: 2+ peripheral pulses  Neurological: A/O x 3, Left foot drop when ambulating       S/p CABG  Respiratory failure due to recurrent left lung plugging now s/p trach  vent weaning   ESRD on HD  Post op AFib   History of RV dysfunction   h/o Recurrent ascites   Acute blood loss anemia   Post operative pain   Sacral decub     55 yo with history of  ESRD on HD, chronic RV dysfunction with recurrent ascites, s/p CABG on .  Post op course complicated by sepsis/pneumonia  Now Current active issue is respiratory failure due to left sided mucus plugging and poor cough and inability to manage secretion.     Neuro - pain management   Foot drop - likely peripheral nerve injury     CVS - recovering from CABG - ECHO  LV EF nl, RV remains dilated and hypokinetic (chronic)  AFIB - plan to anticoagulate    Pulm - vent weaning - pt now asking to stay on vent due to feeling SOB when lying flat - has known ascites, discussed draining ascites to see if symptoms improve  Concern diaphragmatic dysfunction - will need Sniff study   tolerating TC when sitting upright     GI - TF  Discussed with team drainage of ascites tmr     - tolerated HD today     Heme - acute on chronic anemia (post op)  FE/EPO  anticoagulation for PAF     Care plan discussed with the ICU care team.   Patient remains critical, at risk for life threatening decompensation.    I have spent 50 minutes providing critical care management to this patient.    By signing my name below, I, Sonia Sánchez, attest that this documentation has been prepared under the direction and in the presence of Herminio Mendez MD.   Electronically signed: Sonia Sánchez, 02-10-25 @ 08:39    I, Herminio Mendez, personally performed the services described in this documentation. all medical record entries made by the scribe were at my direction and in my presence. I have reviewed the chart and agree that the record reflects my personal performance and is accurate and complete  Electronically signed: Herminio Mendez MD.    Brief history :   55 yo M with multiple medical problems including CAD s/p PCI in 2024 s/p CABG x 3 on 24  1: Intubated for hypoxia & left lung white out s/p awake bronch with removal of copious mucopurulent secretions  : s/p IABP insertion, sepsis/septic shock due to E coli   ESBL pneumonia, collapsed Left Lung, s/p multiple bronch    tracheostomy tube placement    VRE E. Faecium Bcx   +HSV PCR BAL   tissue cx from back abscess - Serratia   - - intermittent bradycardia/junctional episodes with hypotension  2/3: off , off theophylline. iHD 1L UF  : bronch for Left lung collapse wound vac, 2decho w/ moderate pericardial effusion - similar as before, US abd: small - moderate ascites - monitor at this time.  Wound vac placed for sacral wounds  : iHD-1L UF  : bronch today off positive pressure. : MRSA and serratia from cyst on the back. Plan for Levaquin + Vanc x 5 days   : concern for left food drop   2/10 : no acute issues - CXR shows improving left sided aeration, pt subjectively reports having difficulty while lying flat    ICU Vital Signs Last 24 Hrs  T(C): 36.2 (02-10-25 @ 08:00), Max: 37 (02-10-25 @ 00:00)  T(F): 97.2 (02-10-25 @ 08:00), Max: 98.6 (02-10-25 @ 00:00)  HR: 57 (02-10-25 @ 08:00) (56 - 111)  BP: 130/65 (02-10-25 @ 08:00) (106/61 - 165/79)  BP(mean): 89 (02-10-25 @ 08:00) (73 - 120)  RR: 11 (02-10-25 @ 08:00) (10 - )  SpO2: 98% (02-10-25 @ 08:00) (95% - 100%)    I&O's Summary    2025 07:01  -  10 Feb 2025 07:00  --------------------------------------------------------  IN: 1732 mL / OUT: 0 mL / NET: 1732 mL    10 Feb 2025 07:01  -  10 Feb 2025 08:39  --------------------------------------------------------  IN: 125 mL / OUT: 0 mL / NET: 125 mL      Mode: CPAP with PS  FiO2: 40  PEEP: 10  PS: 15  MAP: 12  PIP: 24                        7.8    9.11  )-----------( 216      ( 10 Feb 2025 00:29 )             25.0     10 Feb 2025 00:29    135    |  94     |  38     ----------------------------<  146    5.0     |  25     |  5.93     Ca    8.7        10 Feb 2025 00:29  Phos  4.4       10 Feb 2025 00:29  Mg     2.3       10 Feb 2025 00:29    TPro  6.1    /  Alb  2.8    /  TBili  0.4    /  DBili  x      /  AST  18     /  ALT  11     /  AlkPhos  97     10 Feb 2025 00:29    PT/INR - ( 10 Feb 2025 00:29 )   PT: 15.2 sec;   INR: 1.33 ratio     PTT - ( 10 Feb 2025 00:29 )  PTT:34.6 sec      MEDICATIONS  (STANDING):  acetylcysteine 10%  Inhalation 4 milliLiter(s) Inhalation every 6 hours  aMIOdarone    Tablet 200 milliGRAM(s) Oral daily  ascorbic acid 500 milliGRAM(s) Oral daily  aspirin  chewable 81 milliGRAM(s) Oral daily  atorvastatin 80 milliGRAM(s) Oral at bedtime  epoetin ignacia (EPOGEN) Injectable 69052 Unit(s) IV Push <User Schedule>  heparin   Injectable 5000 Unit(s) SubCutaneous every 8 hours  insulin lispro (ADMELOG) corrective regimen sliding scale   SubCutaneous every 6 hours  levalbuterol Inhalation 0.63 milliGRAM(s) Inhalation every 8 hours  melatonin 5 milliGRAM(s) Oral at bedtime  methocarbamol 500 milliGRAM(s) Oral every 8 hours  metoprolol tartrate 12.5 milliGRAM(s) Oral every 12 hours  mirtazapine 7.5 milliGRAM(s) Oral daily  Nephro-elicia 1 Tablet(s) Oral daily  pantoprazole  Injectable 40 milliGRAM(s) IV Push daily  polyethylene glycol 3350 17 Gram(s) Oral two times a day  traZODone 50 milliGRAM(s) Oral at bedtime  vancomycin  IVPB 1000 milliGRAM(s) IV Intermittent every 24 hours      PHYSICAL EXAM:  Gen : no acute distress  Neck: + trach   Respiratory: decreased in the bases  Cardiovascular: S1 and S2, RRR, no M/G/R  Gastrointestinal: distended + ascites   Extremities: No peripheral edema  Vascular: 2+ peripheral pulses  Neurological: A/O x 3, Left foot drop when ambulating       S/p CABG  Respiratory failure due to recurrent left lung plugging now s/p trach  vent weaning   ESRD on HD  Post op AFib   History of RV dysfunction   h/o Recurrent ascites   Acute blood loss anemia   Post operative pain   Sacral decub     55 yo with history of  ESRD on HD, chronic RV dysfunction with recurrent ascites, s/p CABG on .  Post op course complicated by sepsis/pneumonia  Now Current active issue is respiratory failure due to left sided mucus plugging and poor cough and inability to manage secretion.     Neuro - pain management   Foot drop - likely peripheral nerve injury     CVS - recovering from CABG - ECHO  LV EF nl, RV remains dilated and hypokinetic (chronic)  AFIB - plan to anticoagulate    Pulm - vent weaning - pt now asking to stay on vent due to feeling SOB when lying flat - has known ascites, discussed draining ascites to see if symptoms improve  Concern diaphragmatic dysfunction - will need Sniff study   tolerating TC when sitting upright     GI - TF  Discussed with team drainage of ascites tmr     - tolerated HD today     Heme - acute on chronic anemia (post op)  FE/EPO  anticoagulation for PAF     ID completed antibiotics for skin and soft tissue infection    Skin - Sacral decub - wound care, Turning Q2   optimize nutrition      Care plan discussed with the ICU care team.   Patient remains critical, at risk for life threatening decompensation.    I have spent 50 minutes providing critical care management to this patient.    By signing my name below, I, Sonia Sánchez, attest that this documentation has been prepared under the direction and in the presence of Herminio Mendez MD.   Electronically signed: Sonia Sánchez, 02-10-25 @ 08:39    I, Herminio Mendez, personally performed the services described in this documentation. all medical record entries made by the scribe were at my direction and in my presence. I have reviewed the chart and agree that the record reflects my personal performance and is accurate and complete  Electronically signed: Herminio Mendez MD.

## 2025-02-11 LAB
ACANTHOCYTES BLD QL SMEAR: SLIGHT — SIGNIFICANT CHANGE UP
ALBUMIN FLD-MCNC: 1.8 G/DL — SIGNIFICANT CHANGE UP
ALBUMIN SERPL ELPH-MCNC: 2.7 G/DL — LOW (ref 3.3–5)
ALP SERPL-CCNC: 98 U/L — SIGNIFICANT CHANGE UP (ref 40–120)
ALT FLD-CCNC: 9 U/L — LOW (ref 10–45)
ANION GAP SERPL CALC-SCNC: 14 MMOL/L — SIGNIFICANT CHANGE UP (ref 5–17)
ANISOCYTOSIS BLD QL: SIGNIFICANT CHANGE UP
APTT BLD: 33.5 SEC — SIGNIFICANT CHANGE UP (ref 24.5–35.6)
AST SERPL-CCNC: 17 U/L — SIGNIFICANT CHANGE UP (ref 10–40)
B PERT IGG+IGM PNL SER: CLEAR — SIGNIFICANT CHANGE UP
BASOPHILS # BLD AUTO: 0 K/UL — SIGNIFICANT CHANGE UP (ref 0–0.2)
BASOPHILS NFR BLD AUTO: 0 % — SIGNIFICANT CHANGE UP (ref 0–2)
BASOPHILS NFR BLD AUTO: 0.4 % — SIGNIFICANT CHANGE UP (ref 0–2)
BILIRUB SERPL-MCNC: 0.4 MG/DL — SIGNIFICANT CHANGE UP (ref 0.2–1.2)
CALCIUM SERPL-MCNC: 8.6 MG/DL — SIGNIFICANT CHANGE UP (ref 8.4–10.5)
CHLORIDE SERPL-SCNC: 97 MMOL/L — SIGNIFICANT CHANGE UP (ref 96–108)
CO2 SERPL-SCNC: 25 MMOL/L — SIGNIFICANT CHANGE UP (ref 22–31)
COLOR FLD: YELLOW
DACRYOCYTES BLD QL SMEAR: SLIGHT — SIGNIFICANT CHANGE UP
EGFR: 14 ML/MIN/1.73M2 — LOW
EGFR: 14 ML/MIN/1.73M2 — LOW
EOSINOPHIL # BLD AUTO: 0.82 K/UL — HIGH (ref 0–0.5)
EOSINOPHIL # BLD AUTO: 0.82 K/UL — HIGH (ref 0–0.5)
EOSINOPHIL NFR BLD AUTO: 4.8 % — SIGNIFICANT CHANGE UP (ref 0–6)
EOSINOPHIL NFR BLD AUTO: 5.2 % — SIGNIFICANT CHANGE UP (ref 0–6)
FLUBV AG NPH QL: SIGNIFICANT CHANGE UP
FLUID INTAKE SUBSTANCE CLASS: SIGNIFICANT CHANGE UP
GLUCOSE SERPL-MCNC: 140 MG/DL — HIGH (ref 70–99)
GRAM STN FLD: ABNORMAL
GRAM STN FLD: SIGNIFICANT CHANGE UP
HCT VFR BLD CALC: 24.4 % — LOW (ref 39–50)
HCT VFR BLD CALC: 26 % — LOW (ref 39–50)
HGB BLD-MCNC: 7.6 G/DL — LOW (ref 13–17)
HGB BLD-MCNC: 8 G/DL — LOW (ref 13–17)
IMM GRANULOCYTES NFR BLD AUTO: 0.8 % — SIGNIFICANT CHANGE UP (ref 0–0.9)
INR BLD: 1.47 RATIO — HIGH (ref 0.85–1.16)
LDH SERPL L TO P-CCNC: 101 U/L — SIGNIFICANT CHANGE UP
LYMPHOCYTES # BLD AUTO: 0.41 K/UL — LOW (ref 1–3.3)
LYMPHOCYTES # BLD AUTO: 0.45 K/UL — LOW (ref 1–3.3)
LYMPHOCYTES # BLD AUTO: 2.6 % — LOW (ref 13–44)
LYMPHOCYTES # BLD AUTO: 2.7 % — LOW (ref 13–44)
LYMPHOCYTES # FLD: 28 % — SIGNIFICANT CHANGE UP
MACROCYTES BLD QL: SIGNIFICANT CHANGE UP
MAGNESIUM SERPL-MCNC: 2.2 MG/DL — SIGNIFICANT CHANGE UP (ref 1.6–2.6)
MANUAL SMEAR VERIFICATION: SIGNIFICANT CHANGE UP
MCHC RBC-ENTMCNC: 30.5 PG — SIGNIFICANT CHANGE UP (ref 27–34)
MCHC RBC-ENTMCNC: 30.7 PG — SIGNIFICANT CHANGE UP (ref 27–34)
MCHC RBC-ENTMCNC: 30.8 G/DL — LOW (ref 32–36)
MCHC RBC-ENTMCNC: 31.1 G/DL — LOW (ref 32–36)
MCV RBC AUTO: 98 FL — SIGNIFICANT CHANGE UP (ref 80–100)
MCV RBC AUTO: 99.6 FL — SIGNIFICANT CHANGE UP (ref 80–100)
MONOCYTES # BLD AUTO: 1.2 K/UL — HIGH (ref 0–0.9)
MONOCYTES # BLD AUTO: 1.63 K/UL — HIGH (ref 0–0.9)
MONOCYTES NFR BLD AUTO: 10.4 % — SIGNIFICANT CHANGE UP (ref 2–14)
MONOCYTES NFR BLD AUTO: 7.1 % — SIGNIFICANT CHANGE UP (ref 2–14)
MONOS+MACROS # FLD: 60 % — SIGNIFICANT CHANGE UP
NEUTROPHILS # BLD AUTO: 12.83 K/UL — HIGH (ref 1.8–7.4)
NEUTROPHILS # BLD AUTO: 14.29 K/UL — HIGH (ref 1.8–7.4)
NEUTROPHILS NFR BLD AUTO: 81.8 % — HIGH (ref 43–77)
NEUTROPHILS NFR BLD AUTO: 84.2 % — HIGH (ref 43–77)
NEUTROPHILS-BODY FLUID: 12 % — SIGNIFICANT CHANGE UP
NRBC BLD AUTO-RTO: 0 /100 WBCS — SIGNIFICANT CHANGE UP (ref 0–0)
OVALOCYTES BLD QL SMEAR: SLIGHT — SIGNIFICANT CHANGE UP
PHOSPHATE SERPL-MCNC: 3.6 MG/DL — SIGNIFICANT CHANGE UP (ref 2.5–4.5)
PLAT MORPH BLD: ABNORMAL
PLATELET # BLD AUTO: 229 K/UL — SIGNIFICANT CHANGE UP (ref 150–400)
PLATELET # BLD AUTO: 242 K/UL — SIGNIFICANT CHANGE UP (ref 150–400)
POIKILOCYTOSIS BLD QL AUTO: SLIGHT — SIGNIFICANT CHANGE UP
POLYCHROMASIA BLD QL SMEAR: SLIGHT — SIGNIFICANT CHANGE UP
POTASSIUM SERPL-MCNC: 4.7 MMOL/L — SIGNIFICANT CHANGE UP (ref 3.5–5.3)
POTASSIUM SERPL-SCNC: 4.7 MMOL/L — SIGNIFICANT CHANGE UP (ref 3.5–5.3)
PROT SERPL-MCNC: 6 G/DL — SIGNIFICANT CHANGE UP (ref 6–8.3)
PROTHROM AB SERPL-ACNC: 16.7 SEC — HIGH (ref 9.9–13.4)
RBC # BLD: 2.49 M/UL — LOW (ref 4.2–5.8)
RBC # FLD: 22.9 % — HIGH (ref 10.3–14.5)
RBC # FLD: 22.9 % — HIGH (ref 10.3–14.5)
RBC BLD AUTO: ABNORMAL
RCV VOL RI: <2000 CELLS/UL — SIGNIFICANT CHANGE UP
RSV RNA NPH QL NAA+NON-PROBE: SIGNIFICANT CHANGE UP
SARS-COV-2 RNA SPEC QL NAA+PROBE: SIGNIFICANT CHANGE UP
SODIUM SERPL-SCNC: 136 MMOL/L — SIGNIFICANT CHANGE UP (ref 135–145)
SPECIMEN SOURCE: SIGNIFICANT CHANGE UP
SPECIMEN SOURCE: SIGNIFICANT CHANGE UP
TOTAL NUCLEATED CELL COUNT, BODY FLUID: 176 CELLS/UL — SIGNIFICANT CHANGE UP
TUBE TYPE: SIGNIFICANT CHANGE UP
WBC # BLD: 15.69 K/UL — HIGH (ref 3.8–10.5)
WBC # BLD: 16.96 K/UL — HIGH (ref 3.8–10.5)
WBC # FLD AUTO: 15.69 K/UL — HIGH (ref 3.8–10.5)
WBC # FLD AUTO: 16.96 K/UL — HIGH (ref 3.8–10.5)
WBC COUNT.: 164 CELLS/UL — SIGNIFICANT CHANGE UP

## 2025-02-11 PROCEDURE — 99291 CRITICAL CARE FIRST HOUR: CPT | Mod: 25

## 2025-02-11 PROCEDURE — 99232 SBSQ HOSP IP/OBS MODERATE 35: CPT

## 2025-02-11 PROCEDURE — G0545: CPT

## 2025-02-11 PROCEDURE — 71045 X-RAY EXAM CHEST 1 VIEW: CPT | Mod: 26

## 2025-02-11 RX ORDER — HYDROMORPHONE/SOD CHLOR,ISO/PF 2 MG/10 ML
0.5 SYRINGE (ML) INJECTION ONCE
Refills: 0 | Status: DISCONTINUED | OUTPATIENT
Start: 2025-02-11 | End: 2025-02-11

## 2025-02-11 RX ORDER — MAGNESIUM SULFATE 500 MG/ML
2 SYRINGE (ML) INJECTION ONCE
Refills: 0 | Status: COMPLETED | OUTPATIENT
Start: 2025-02-11 | End: 2025-02-11

## 2025-02-11 RX ORDER — FERROUS SULFATE 137(45) MG
300 TABLET, EXTENDED RELEASE ORAL DAILY
Refills: 0 | Status: DISCONTINUED | OUTPATIENT
Start: 2025-02-11 | End: 2025-03-19

## 2025-02-11 RX ORDER — ALBUMIN (HUMAN) 12.5 G/50ML
250 INJECTION, SOLUTION INTRAVENOUS ONCE
Refills: 0 | Status: COMPLETED | OUTPATIENT
Start: 2025-02-11 | End: 2025-02-11

## 2025-02-11 RX ORDER — FERROUS SULFATE 137(45) MG
325 TABLET, EXTENDED RELEASE ORAL DAILY
Refills: 0 | Status: DISCONTINUED | OUTPATIENT
Start: 2025-02-11 | End: 2025-02-11

## 2025-02-11 RX ORDER — ALBUMIN (HUMAN) 12.5 G/50ML
250 INJECTION, SOLUTION INTRAVENOUS ONCE
Refills: 0 | Status: DISCONTINUED | OUTPATIENT
Start: 2025-02-11 | End: 2025-02-14

## 2025-02-11 RX ADMIN — ATORVASTATIN CALCIUM 80 MILLIGRAM(S): 80 TABLET, FILM COATED ORAL at 21:33

## 2025-02-11 RX ADMIN — METHOCARBAMOL 500 MILLIGRAM(S): 500 TABLET, FILM COATED ORAL at 05:32

## 2025-02-11 RX ADMIN — Medication 81 MILLIGRAM(S): at 08:38

## 2025-02-11 RX ADMIN — Medication 1 SPRAY(S): at 17:39

## 2025-02-11 RX ADMIN — ACETYLCYSTEINE 4 MILLILITER(S): 200 INHALANT RESPIRATORY (INHALATION) at 23:07

## 2025-02-11 RX ADMIN — ALBUMIN (HUMAN) 125 MILLILITER(S): 12.5 INJECTION, SOLUTION INTRAVENOUS at 17:27

## 2025-02-11 RX ADMIN — Medication 15 MILLILITER(S): at 17:39

## 2025-02-11 RX ADMIN — LEVALBUTEROL HYDROCHLORIDE 0.63 MILLIGRAM(S): 1.25 SOLUTION RESPIRATORY (INHALATION) at 05:38

## 2025-02-11 RX ADMIN — Medication 1 TABLET(S): at 08:37

## 2025-02-11 RX ADMIN — METOPROLOL SUCCINATE 12.5 MILLIGRAM(S): 50 TABLET, EXTENDED RELEASE ORAL at 08:37

## 2025-02-11 RX ADMIN — MIRTAZAPINE 7.5 MILLIGRAM(S): 30 TABLET, FILM COATED ORAL at 08:38

## 2025-02-11 RX ADMIN — Medication 40 MILLIGRAM(S): at 08:37

## 2025-02-11 RX ADMIN — Medication 1 APPLICATION(S): at 05:36

## 2025-02-11 RX ADMIN — LEVALBUTEROL HYDROCHLORIDE 0.63 MILLIGRAM(S): 1.25 SOLUTION RESPIRATORY (INHALATION) at 11:50

## 2025-02-11 RX ADMIN — METHOCARBAMOL 500 MILLIGRAM(S): 500 TABLET, FILM COATED ORAL at 14:29

## 2025-02-11 RX ADMIN — Medication 5 MILLIGRAM(S): at 21:33

## 2025-02-11 RX ADMIN — LEVALBUTEROL HYDROCHLORIDE 0.63 MILLIGRAM(S): 1.25 SOLUTION RESPIRATORY (INHALATION) at 23:06

## 2025-02-11 RX ADMIN — POLYETHYLENE GLYCOL 3350 17 GRAM(S): 17 POWDER, FOR SOLUTION ORAL at 18:23

## 2025-02-11 RX ADMIN — OXYCODONE HYDROCHLORIDE 5 MILLIGRAM(S): 30 TABLET ORAL at 22:00

## 2025-02-11 RX ADMIN — OXYCODONE HYDROCHLORIDE 10 MILLIGRAM(S): 30 TABLET ORAL at 08:50

## 2025-02-11 RX ADMIN — OXYCODONE HYDROCHLORIDE 10 MILLIGRAM(S): 30 TABLET ORAL at 22:00

## 2025-02-11 RX ADMIN — AMIODARONE HYDROCHLORIDE 200 MILLIGRAM(S): 50 INJECTION, SOLUTION INTRAVENOUS at 05:32

## 2025-02-11 RX ADMIN — Medication 1 SPRAY(S): at 05:32

## 2025-02-11 RX ADMIN — Medication 300 MILLIGRAM(S): at 17:39

## 2025-02-11 RX ADMIN — Medication 0.5 MILLIGRAM(S): at 13:40

## 2025-02-11 RX ADMIN — ALBUMIN (HUMAN) 125 MILLILITER(S): 12.5 INJECTION, SOLUTION INTRAVENOUS at 21:34

## 2025-02-11 RX ADMIN — ACETYLCYSTEINE 4 MILLILITER(S): 200 INHALANT RESPIRATORY (INHALATION) at 17:34

## 2025-02-11 RX ADMIN — ACETYLCYSTEINE 4 MILLILITER(S): 200 INHALANT RESPIRATORY (INHALATION) at 11:50

## 2025-02-11 RX ADMIN — Medication 0.5 MILLIGRAM(S): at 13:25

## 2025-02-11 RX ADMIN — OXYCODONE HYDROCHLORIDE 10 MILLIGRAM(S): 30 TABLET ORAL at 09:20

## 2025-02-11 RX ADMIN — Medication 500 MILLIGRAM(S): at 08:38

## 2025-02-11 RX ADMIN — Medication 50 MILLIGRAM(S): at 21:34

## 2025-02-11 RX ADMIN — METHOCARBAMOL 500 MILLIGRAM(S): 500 TABLET, FILM COATED ORAL at 21:34

## 2025-02-11 RX ADMIN — ACETYLCYSTEINE 4 MILLILITER(S): 200 INHALANT RESPIRATORY (INHALATION) at 05:38

## 2025-02-11 RX ADMIN — Medication 15 MILLILITER(S): at 05:32

## 2025-02-11 RX ADMIN — Medication 25 GRAM(S): at 02:15

## 2025-02-11 RX ADMIN — OXYCODONE HYDROCHLORIDE 10 MILLIGRAM(S): 30 TABLET ORAL at 21:33

## 2025-02-11 RX ADMIN — OXYCODONE HYDROCHLORIDE 5 MILLIGRAM(S): 30 TABLET ORAL at 21:34

## 2025-02-11 NOTE — PROGRESS NOTE ADULT - SUBJECTIVE AND OBJECTIVE BOX
[facility] [place] [facility]   MD DREW VIZCARRA MD SAKIL BHUIYAN, MD MARIA SANTIAGO, JAX    TEL:  OFFICE: [phone]  From 5pm-7am Answering Service [phone]    -- RENAL FOLLOW UP NOTE ---Date of Service 02-11-25 @ 13:56    Patient is a 54y old  Male who presents with a chief complaint of CAD s/p CABG       Patient seen and examined at bedside. No chest pain/sob    VITALS:  T(F): 98.6 (02-11-25 @ 12:50), Max: 99.1 (02-11-25 @ 11:00)  HR: 94 (02-11-25 @ 13:00)  BP: 125/75 (02-11-25 @ 13:00)  RR: 15 (02-11-25 @ 13:00)  SpO2: 95% (02-11-25 @ 13:00)  Wt(kg): --    02-10 @ 07:01  -  02-11 @ 07:00  --------------------------------------------------------  IN: 2085 mL / OUT: 3000 mL / NET: -915 mL    02-11 @ 07:01  -  02-11 @ 13:56  --------------------------------------------------------  IN: 320 mL / OUT: 0 mL / NET: 320 mL          PHYSICAL EXAM:  General: NAD  Neck: No JVD  Respiratory: +trach  Cardiovascular: S1, S2, RRR  Gastrointestinal: distended  Extremities: No peripheral edema    Hospital Medications:   MEDICATIONS  (STANDING):  acetylcysteine 10%  Inhalation 4 milliLiter(s) Inhalation every 6 hours  aMIOdarone    Tablet 200 milliGRAM(s) Oral daily  ascorbic acid 500 milliGRAM(s) Oral daily  aspirin  chewable 81 milliGRAM(s) Oral daily  atorvastatin 80 milliGRAM(s) Oral at bedtime  chlorhexidine 0.12% Liquid 15 milliLiter(s) Oral Mucosa every 12 hours  chlorhexidine 2% Cloths 1 Application(s) Topical daily  chlorhexidine 4% Liquid 1 Application(s) Topical <User Schedule>  dextrose 50% Injectable 50 milliLiter(s) IV Push every 15 minutes  epoetin ignacia (EPOGEN) Injectable 34524 Unit(s) IV Push <User Schedule>  ferrous    sulfate Liquid 300 milliGRAM(s) Enteral Tube daily  insulin lispro (ADMELOG) corrective regimen sliding scale   SubCutaneous every 6 hours  levalbuterol Inhalation 0.63 milliGRAM(s) Inhalation every 8 hours  melatonin 5 milliGRAM(s) Oral at bedtime  methocarbamol 500 milliGRAM(s) Oral every 8 hours  metoprolol tartrate 12.5 milliGRAM(s) Oral <User Schedule>  mirtazapine 7.5 milliGRAM(s) Oral daily  Nephro-elicia 1 Tablet(s) Oral daily  pantoprazole  Injectable 40 milliGRAM(s) IV Push daily  polyethylene glycol 3350 17 Gram(s) Oral two times a day  sodium chloride 0.65% Nasal 1 Spray(s) Both Nostrils every 12 hours  sodium chloride 0.9%. 1000 milliLiter(s) (10 mL/Hr) IV Continuous <Continuous>  traZODone 50 milliGRAM(s) Oral at bedtime      LABS:  02-11    136  |  97  |  32[H]  ----------------------------<  140[H]  4.7   |  25  |  4.69[H]    Ca    8.6      11 Feb 2025 00:32  Phos  3.6     02-11  Mg     2.2     02-11    TPro  6.0  /  Alb  2.7[L]  /  TBili  0.4  /  DBili      /  AST  17  /  ALT  9[L]  /  AlkPhos  98  02-11    Creatinine Trend: 4.69 <--, 5.93 <--, 5.13 <--, 4.28 <--, 4.37 <--, 4.45 <--, 5.10 <--    Albumin: 2.7 g/dL (02-11 @ 00:32)  Phosphorus: 3.6 mg/dL (02-11 @ 00:32)                              7.6    15.69 )-----------( 229      ( 11 Feb 2025 00:32 )             24.4     Urine Studies:  Urinalysis - [02-11-25 @ 00:32]      Color  / Appearance  / SG  / pH       Gluc 140 / Ketone   / Bili  / Urobili        Blood  / Protein  / Leuk Est  / Nitrite       RBC  / WBC  / Hyaline  / Gran  / Sq Epi  / Non Sq Epi  / Bacteria       Iron 29, TIBC 143, %sat 20      [12-19-24 @ 05:00]  Ferritin 904      [12-19-24 @ 05:00]  TSH 4.75      [12-24-24 @ 06:50]        RADIOLOGY & ADDITIONAL STUDIES:

## 2025-02-11 NOTE — CHART NOTE - NSCHARTNOTEFT_GEN_A_CORE
NUTRITION FOLLOW UP NOTE    PATIENT SEEN FOR: ICU follow up    SOURCE: [] Patient  [x] Current Medical Record  [x] RN  [] Family/support person at bedside  [x] Patient unavailable/inappropriate  [] Other:    CHART REVIEWED/EVENTS NOTED.  [] No changes to nutrition care plan to note  [x] Nutrition Status:  -s/p CABG x 3 on 24  -: Intubated for hypoxia & left lung white out   - tracheostomy tube placement.   -ESRD - tolerating iHD, on HD PTA.      DIET ORDER:   Diet, NPO with Tube Feed:   Tube Feeding Modality: Nasogastric  Vital 1.5 Jeremias (VITAL1.5RTH)  Total Volume for 24 Hours (mL): 1440  Continuous  Starting Tube Feed Rate {mL per Hour}: 20  Increase Tube Feed Rate by (mL): 10     Every 4 hours  Until Goal Tube Feed Rate (mL per Hour): 60  Tube Feed Duration (in Hours): 24  Tube Feed Start Time: 17:00 (25)  Diet, NPO:   NPO for Procedure/Test     NPO Start Date: 2025,   NPO Start Time: 04:00 (25)      CURRENT DIET ORDER IS:  [] Appropriate:  [x] Inadequate:  [] Other:    NUTRITION INTAKE/PROVISION:  [] PO:  [x] Enteral Nutrition:  Formula changed from Nepro to Vital 1.5 on , reason unclear - not documented in chart.    EN Order Provides:  1440 ml formula, 2160 kcal, 97 g protein, 1100 ml free water; meets 27.7 kcal/kg and 1.24 g protein/kg, based on IBW 172lb/78kg    Current Pump Rate: 60ml/hr  5-Day Average Enteral Provision per RN flowsheet: 1392 mL, 2088 kcals, 94 g protein   [] Parenteral Nutrition:    ANTHROPOMETRICS:  Drug Dosing Weight  Height (cm): 180.3 (30 Dec 2024 12:01)  Weight (kg): 85.1 (30 Dec 2024 12:01)  BMI (kg/m2): 26.2 (30 Dec 2024 12:01)  Weights:   Daily Weight in k.7 (), 85 (02-10), 88 (02-10), 87.5 (02-10), 89.7 (), 85.9 (), 80.8 (), 87 (), 86 (), 86 (), 87.1 (), 88.1 (), 89.8 (), 91.2 (), 97.1 (), 85.6 (), 81.6 (), Weight in k.7 (), 80 (), 88 (), 83.4 (), 77.7 (), 82 (01-15), 81 (), 81 (), 88.7 (), 83.3 (), 97.6 (01-10), 79.6 (), 93.4 (), 87.9 (), 85 (), 86.2 (), 90 (), 84 (), 77.5 (), 87.8 (), 88.3 (, standing), 85.3 (, standing), 85 (, standing), 79.7 (), 80.9 (, standing), 81.3 (, standing), 82.6 ().  Weight fluctuations likely in setting of fluid shifts and/or scale inaccuracies. RD to continue to monitor weight trends as available/able.     NUTRITIONALLY PERTINENT MEDICATIONS:  MEDICATIONS  (STANDING):  aMIOdarone    Tablet 200 milliGRAM(s) Oral daily  ascorbic acid 500 milliGRAM(s) Oral daily  atorvastatin 80 milliGRAM(s) Oral at bedtime  epoetin ignacia (EPOGEN) Injectable 92150 Unit(s) IV Push <User Schedule>  heparin  Infusion 500 Unit(s)/Hr (5 mL/Hr) IV Continuous <Continuous>  insulin lispro (ADMELOG) corrective regimen sliding scale   SubCutaneous every 6 hours  methocarbamol 500 milliGRAM(s) Oral every 8 hours  metoprolol tartrate 12.5 milliGRAM(s) Oral <User Schedule>  mirtazapine 7.5 milliGRAM(s) Oral daily  Nephro-elicia 1 Tablet(s) Oral daily  pantoprazole  Injectable 40 milliGRAM(s) IV Push daily  polyethylene glycol 3350 17 Gram(s) Oral two times a day  sodium chloride 0.65% Nasal 1 Spray(s) Both Nostrils every 12 hours  sodium chloride 0.9%. 1000 milliLiter(s) (10 mL/Hr) IV Continuous <Continuous>  traZODone 50 milliGRAM(s) Oral at bedtime      NUTRITIONALLY PERTINENT LABS:   Na136 mmol/L Glu 140 mg/dL[H] K+ 4.7 mmol/L Cr  4.69 mg/dL[H] BUN 32 mg/dL[H]  Phos 3.6 mg/dL  Alb 2.7 g/dL[L] ALT 9 U/L[L] AST 17 U/L Alkaline Phosphatase 98 U/L    A1C with Estimated Average Glucose Result: 5.6 % (24 @ 06:50)          Finger Sticks:  POCT Blood Glucose.: 175 mg/dL ( @ 06:40)  POCT Blood Glucose.: 124 mg/dL ( @ 00:16)  POCT Blood Glucose.: 133 mg/dL (02-10 @ 17:56)  POCT Blood Glucose.: 98 mg/dL (02-10 @ 11:37)      NUTRITIONALLY PERTINENT MEDICATIONS/LABS:  [x] Reviewed  [x] Relevant notes on medications/labs:  -insulin for glycemic control  -remeron which can stimulate appetite    EDEMA:  [x] Reviewed  [x] Relevant notes: 1+ generalized edema per flowsheets     GI/ I&O:  [x] Reviewed  [x] Relevant notes: tolerating enteral nutrition per RN. No GI signs and symptoms per flowsheets. Last BM .  [] Other:    SKIN:   [] No pressure injuries documented, per nursing flowsheet  [] Pressure injury previously noted  [x] Change in pressure injury documentation:  -sacrum unstageable pressure injury per 2/10 flowsheets   [x] Other:  -Per Wound Care : "Sacral/buttocks wound 2/2 skin failure, Rt upper back injury now resolved"  -midsternal surgical incision from CABG     ESTIMATED NEEDS:  [x] No change:  [] Updated:  Energy:  9796-1334 kcal/day (30-35 kcal/kg)  Protein:  109-140 g/day (1.4-1.8 g/kg)  Fluid:   ml/day or [x] defer to team  Based on: IBW 172lb/78kg with consideration for HD, skin integrity     NUTRITION DIAGNOSIS:  [x] Prior Dx:  Inadequate energy intake, Increased Nutrient Needs (protein-energy, micronutrients)  [] New Dx:    EDUCATION:  [] Yes:  [x] Not appropriate/warranted    NUTRITION CARE PLAN:  1. Diet: Change enteral nutrition formula to Nepro @ 60ml/hr x 24hr. At goal provides 1440ml, 2592kcal, 117g protein, 1047ml free water; meets 33.2kcal/kg and 1.50g/kg protein based on IBW 172lb/78kg. Defer free water flushes to medical team.   2. Multivitamin/mineral supplementation:  continue nephro-elicia, defer Vitamin C to medial team in setting of HD.    [] Achieved - Continue current nutrition intervention(s)  [] Current medical condition precludes nutrition intervention at this time.    MONITORING AND EVALUATION:   RD remains available upon request and will follow up per protocol.    Corinne Lima RDN, CDN - available via [facility] TE[employer]

## 2025-02-11 NOTE — PROGRESS NOTE ADULT - SUBJECTIVE AND OBJECTIVE BOX
Brief history :   55 yo M with multiple medical problems including CAD s/p PCI in 2024 s/p CABG x 3 on 24  1: Intubated for hypoxia & left lung white out s/p awake bronch with removal of copious mucopurulent secretions  : s/p IABP insertion, sepsis/septic shock due to E coli   ESBL pneumonia, collapsed Left Lung, s/p multiple bronch    tracheostomy tube placement    VRE E. Faecium Bcx   +HSV PCR BAL   tissue cx from back abscess - Serratia   - - intermittent bradycardia/junctional episodes with hypotension  2/3: off , off theophylline. iHD 1L UF  : bronch for Left lung collapse wound vac, 2decho w/ moderate pericardial effusion - similar as before, US abd: small - moderate ascites - monitor at this time.  Wound vac placed for sacral wounds  : iHD-1L UF  : bronch today off positive pressure. : MRSA and serratia from cyst on the back. Plan for Levaquin + Vanc x 5 days   : concern for left food drop   2/10 : no acute issues - CXR shows improving left sided aeration, pt subjectively reports having difficulty while lying flat      ICU Vital Signs Last 24 Hrs  T(C): 37.1 (25 @ 04:00), Max: 37.1 (25 @ 04:00)  T(F): 98.8 (25 @ 04:00), Max: 98.8 (25 @ 04:00)  HR: 93 (25 @ 06:00) (57 - 120)  BP: 124/74 (25 @ 06:00) (81/50 - 159/78)  BP(mean): 95 (25 @ 06:00) (61 - 122)  ABP: --  ABP(mean): --  RR: 36 (25 @ 06:00) (11 - 36)  SpO2: 94% (25 @ 06:00) (94% - 100%)    I&O's Summary    2025 07:01  -  10 Feb 2025 07:00  --------------------------------------------------------  IN: 1732 mL / OUT: 0 mL / NET: 1732 mL    10 Feb 2025 07:01  -  2025 06:52  --------------------------------------------------------  IN: 2085 mL / OUT: 3000 mL / NET: -915 mL    Mode: CPAP with PS  FiO2: 40  PEEP: 10  PS: 15  MAP: 14  PIP: 26                        7.6    15.69 )-----------( 229      ( 2025 00:32 )             24.4     2025 00:32    136    |  97     |  32     ----------------------------<  140    4.7     |  25     |  4.69     Ca    8.6        2025 00:32  Phos  3.6       2025 00:32  Mg     2.2       2025 00:32    TPro  6.0    /  Alb  2.7    /  TBili  0.4    /  DBili  x      /  AST  17     /  ALT  9      /  AlkPhos  98     2025 00:32    PT/INR - ( 2025 00:32 )   PT: 16.7 sec;   INR: 1.47 ratio       PTT - ( 2025 00:32 )  PTT:33.5 sec    MEDICATIONS  (STANDING):  acetylcysteine 10%  Inhalation 4 milliLiter(s) Inhalation every 6 hours  aMIOdarone    Tablet 200 milliGRAM(s) Oral daily  ascorbic acid 500 milliGRAM(s) Oral daily  aspirin  chewable 81 milliGRAM(s) Oral daily  atorvastatin 80 milliGRAM(s) Oral at bedtime  chlorhexidine 0.12% Liquid 15 milliLiter(s) Oral Mucosa every 12 hours  chlorhexidine 2% Cloths 1 Application(s) Topical daily  chlorhexidine 4% Liquid 1 Application(s) Topical <User Schedule>  dextrose 50% Injectable 50 milliLiter(s) IV Push every 15 minutes  epoetin ignacia (EPOGEN) Injectable 03085 Unit(s) IV Push <User Schedule>  heparin  Infusion 500 Unit(s)/Hr IV Continuous <Continuous>  insulin lispro (ADMELOG) corrective regimen sliding scale   SubCutaneous every 6 hours  levalbuterol Inhalation 0.63 milliGRAM(s) Inhalation every 8 hours  melatonin 5 milliGRAM(s) Oral at bedtime  methocarbamol 500 milliGRAM(s) Oral every 8 hours  metoprolol tartrate 12.5 milliGRAM(s) Oral <User Schedule>  mirtazapine 7.5 milliGRAM(s) Oral daily  Nephro-elicia 1 Tablet(s) Oral daily  pantoprazole  Injectable 40 milliGRAM(s) IV Push daily  polyethylene glycol 3350 17 Gram(s) Oral two times a day  sodium chloride 0.65% Nasal 1 Spray(s) Both Nostrils every 12 hours  sodium chloride 0.9%. 1000 milliLiter(s) IV Continuous <Continuous>  traZODone 50 milliGRAM(s) Oral at bedtime    Home Medications:  aspirin 81 mg oral delayed release tablet: 1 tab(s) orally once a day (23 Dec 2024 16:58)  calcium acetate 667 mg oral tablet: 1 tab(s) orally 3 times a day (23 Dec 2024 16:58)  carvedilol 12.5 mg oral tablet: 1 tab(s) orally every 12 hours (23 Dec 2024 16:56)  clopidogrel 75 mg oral tablet: 1 tab(s) orally once a day (23 Dec 2024 16:56)  furosemide 20 mg oral tablet: 1 tab(s) orally once a day (23 Dec 2024 16:58)  hydrALAZINE 25 mg oral tablet: 1 tab(s) orally 3 times a day (23 Dec 2024 16:58)  lisinopril 40 mg oral tablet: 1 tab(s) orally once a day (23 Dec 2024 16:58)  lovastatin 10 mg oral tablet: 1 tab(s) orally once a day (23 Dec 2024 16:56)  NIFEdipine 90 mg oral tablet, extended release: 1 tab(s) orally once a day (23 Dec 2024 16:58)  Emani-Elicia oral tablet: 1 tab(s) orally once a day (23 Dec 2024 16:58)  traZODone 100 mg oral tablet: 1 tab(s) orally once a day (at bedtime) (23 Dec 2024 16:56)    PHYSICAL EXAM:  Gen : no acute distress  Neck: + trach   Respiratory: decreased in the bases  Cardiovascular: S1 and S2, RRR, no M/G/R  Gastrointestinal: distended + ascites   Extremities: No peripheral edema  Vascular: 2+ peripheral pulses  Neurological: A/O x 3, Left foot drop when ambulating       S/p CABG  Respiratory failure due to recurrent left lung plugging now s/p trach  vent weaning   ESRD on HD  Post op AFib   History of RV dysfunction   h/o Recurrent ascites   Acute blood loss anemia   Post operative pain   Sacral decub     55 yo with history of  ESRD on HD, chronic RV dysfunction with recurrent ascites, s/p CABG on .  Post op course complicated by sepsis/pneumonia  Now Current active issue is respiratory failure due to left sided mucus plugging and poor cough and inability to manage secretion.     Neuro - pain management   Foot drop - likely peripheral nerve injury     CVS - recovering from CABG - ECHO  LV EF nl, RV remains dilated and hypokinetic (chronic)  AFIB - PO Amio for afib prophylaxis, BB for rate control    Pulm - vent weaning - pt now asking to stay on vent due to feeling SOB when lying flat - has known ascites, discussed draining ascites to see if symptoms improve  Concern diaphragmatic dysfunction - will need Sniff study   Tolerating TC when sitting upright     GI - TF  Discussed with team drainage of ascites today  GI proph/Bowel regimen     - tolerated HD yesterday     Heme - acute on chronic anemia (post op)  ASA / Statin  FE/EPO  Anticoagulation for PAF     ID - completed antibiotics for skin and soft tissue infection    Skin - Sacral decub - wound care, Turning Q2   optimize nutrition          Critical care time spent    min       Care plan discussed with the ICU care team.   Patient remains critical, at risk for life threatening decompensation.    I have spent 30 minutes providing critical care management to this patient.    By signing my name below, I, Baldemar Hernandez, attest that this documentation has been prepared under the direction and in the presence of Herminio Mendez MD.   Electronically signed: Baldemar Hernandez, 25 @ 06:52    I, Herminio Mendez, personally performed the services described in this documentation. All medical record entries made by the scribe were at my direction and in my presence. I have reviewed the chart and agree that the record reflects my personal performance and is accurate and complete  Electronically signed: Herminio Mendez MD.  Brief history :   55 yo M with multiple medical problems including CAD s/p PCI in 2024 s/p CABG x 3 on 24    1: Intubated for hypoxia & left lung white out s/p awake bronch with removal of copious mucopurulent secretions  : s/p IABP insertion, sepsis/septic shock due to E coli   ESBL pneumonia, collapsed Left Lung, s/p multiple bronch    tracheostomy tube placement    VRE E. Faecium Bcx   +HSV PCR BAL   tissue cx from back abscess - Serratia/MRSA  - - intermittent bradycardia/junctional episodes with hypotension  2/3: off , off theophylline. iHD 1L UF  : bronch for Left lung collapse wound vac, 2decho w/ moderate pericardial effusion - similar as before, US abd: small - moderate ascites - monitor at this time.  Wound vac placed for sacral wounds  : iHD-1L UF  : bronch today off positive pressure   : concern for left food drop   2/10 : no acute issues - CXR shows improving left sided aeration, pt subjectively reports having difficulty while lying flat      ICU Vital Signs Last 24 Hrs  T(C): 37.1 (25 @ 04:00), Max: 37.1 (25 @ 04:00)  T(F): 98.8 (25 @ 04:00), Max: 98.8 (25 @ 04:00)  HR: 93 (25 @ 06:00) (57 - 120)  BP: 124/74 (25 @ 06:00) (81/50 - 159/78)  BP(mean): 95 (25 @ 06:00) (61 - 122)  RR: 36 (25 @ 06:00) (11 - 36)  SpO2: 94% (25 @ 06:00) (94% - 100%)    I&O's Summary    2025 07:01  -  10 Feb 2025 07:00  --------------------------------------------------------  IN: 1732 mL / OUT: 0 mL / NET: 1732 mL    10 Feb 2025 07:01  -  2025 06:52  --------------------------------------------------------  IN: 2085 mL / OUT: 3000 mL / NET: -915 mL    Mode: CPAP with PS  FiO2: 40  PEEP: 10  PS: 15  MAP: 14  PIP: 26                        7.6    15.69 )-----------( 229      ( 2025 00:32 )             24.4     2025 00:32    136    |  97     |  32     ----------------------------<  140    4.7     |  25     |  4.69     Ca    8.6        2025 00:32  Phos  3.6       2025 00:32  Mg     2.2       2025 00:32    TPro  6.0    /  Alb  2.7    /  TBili  0.4    /  DBili  x      /  AST  17     /  ALT  9      /  AlkPhos  98     2025 00:32    PT/INR - ( 2025 00:32 )   PT: 16.7 sec;   INR: 1.47 ratio       PTT - ( 2025 00:32 )  PTT:33.5 sec    MEDICATIONS  (STANDING):  traZODone 50 milliGRAM(s) Oral at bedtime  mirtazapine 7.5 milliGRAM(s) Oral daily    aMIOdarone    Tablet 200 milliGRAM(s) Oral daily  ascorbic acid 500 milliGRAM(s) Oral daily  aspirin  chewable 81 milliGRAM(s) Oral daily  atorvastatin 80 milliGRAM(s) Oral at bedtime  metoprolol tartrate 12.5 milliGRAM(s) Oral <User Schedule>    epoetin ignacia (EPOGEN) Injectable 51788 Unit(s) IV Push <User Schedule>  heparin  Infusion 500 Unit(s)/Hr IV Continuous <Continuous>    insulin lispro (ADMELOG) corrective regimen sliding scale   SubCutaneous every 6 hours    levalbuterol Inhalation 0.63 milliGRAM(s) Inhalation every 8 hours    melatonin 5 milliGRAM(s) Oral at bedtime  methocarbamol 500 milliGRAM(s) Oral every 8 hours    Nephro-elicia 1 Tablet(s) Oral daily  pantoprazole  Injectable 40 milliGRAM(s) IV Push daily  polyethylene glycol 3350 17 Gram(s) Oral two times a day  traZODone 50 milliGRAM(s) Oral at bedtime      PHYSICAL EXAM:  Gen : no acute distress  Neck: + trach   Respiratory: decreased in the bases  Cardiovascular: S1 and S2, RRR, no M/G/R  Gastrointestinal: distended + ascites   Extremities: No peripheral edema  Vascular: 2+ peripheral pulses  Neurological: A/O x 3, Left foot drop when ambulating       S/p CABG  Respiratory failure due to recurrent left lung plugging now s/p trach  vent weaning   ESRD on HD  Post op AFib   History of RV dysfunction   h/o Recurrent ascites   Acute blood loss anemia   Post operative pain   Sacral decub     55 yo with history of  ESRD on HD, chronic RV dysfunction with recurrent ascites, s/p CABG on .  Post op course complicated by sepsis/pneumonia  Now Current active issue is respiratory failure due to left sided mucus plugging and poor cough and inability to manage secretion.     Neuro - pain management   Foot drop - likely peripheral nerve injury   continue PT    CVS - recovering from CABG - ECHO  LV EF nl, RV remains dilated and hypokinetic (chronic)  AFIB - PO Amio for afib prophylaxis, BB for rate control - anticoagulation started    Pulm - vent weaning - pt now asking to stay on vent due to feeling SOB when lying flat - has known ascites, discussed draining ascites to see if symptoms improve  Concern diaphragmatic dysfunction - will need formal Sniff study for diagnosis   Tolerating TC when sitting upright   Wean vent   Decrease PS   consider trach down sizing in the next few day s    GI - TF  Discussed with team drainage of ascites today  GI proph/Bowel regimen     - tolerated HD yesterday     Heme - acute on chronic anemia (post op)  ASA / Statin  FE/EPO  Anticoagulation for PAF     ID - completed antibiotics for skin and soft tissue infection  Leukocytosis - send sputum cultures   repeat CBC in PM     Skin - Sacral decub - wound care, Turning Q2   optimize nutrition          Critical care time spent    min       Care plan discussed with the ICU care team.   Patient remains critical, at risk for life threatening decompensation.    I have spent 30 minutes providing critical care management to this patient.    By signing my name below, I, Baldemar Hernandez, attest that this documentation has been prepared under the direction and in the presence of Herminio Mendez MD.   Electronically signed: Baldemar Hernandez, 25 @ 06:52    I, Herminio Mendez, personally performed the services described in this documentation. All medical record entries made by the scribe were at my direction and in my presence. I have reviewed the chart and agree that the record reflects my personal performance and is accurate and complete  Electronically signed: Herminio Mendez MD.

## 2025-02-11 NOTE — PROGRESS NOTE ADULT - ASSESSMENT
54M with HTN, HLD, ESRD on HD MWF via LUE AVF, ascites (requiring repeat paracenteses q4-6wks), NICM with moderate LV dysfunction w/ EF 35-40%() and CAD s/p atherectomy + SALLIE to D1 (2024) presents to [facility] 2024 as transfer from [facility] (via [facility]) where he presented  with SOB.   In [facility] ED, pt was hypoxic to 86% on RA, trop 1.7K, EKG no new changes, CXR fluid overload. Admitted for AHRF 2/2 fluid overload. Pt received HD on , ,  and . DAPT was resumed, TTE showed improvement in LVEF to 40-45%. Stress test was abnormal with 1mm inferior STD, reversible ischemia in the inferior wall and fixed defects in the lateral, anterior and apical walls with post stress EF of 24%. Pt was then transferred to [facility] for cardiac cath which showed pLAD 70% ISR, mLCx 70%, D1 severe dz. Patient now transferred to [facility] 2024 for CABG.       - underwent  C3L free LIMA-LAD; SVG-Diag; SVG-leftPL   - extubated   - hypotension and ECHO showing Systolic HF/depressed BIV Function, currently supported with /pressors.  Labs this evening showing transaminitis   - hypotensive, febrile and reintubated  1/3 - cultures (+) E coli +ESBL bacteremia   - underwent tracheostomy   - left lung collapse s/p bronchoscopy   - seen by plastic surgery / colorectal for sacral wound, no surgical intervention, no evidence of fistula, BC sent  - c/o right arm  pain, started on acyclovir for positive HSV PCR     doing a little better tissue culture with serratia; bronch also growing some yeast and VREC   back with nodule with some discharge    sacral decubitus less tender, some bloody secretions from trach  - still with drainage from back lesion  s/p I & D of lesion and sacrum   noted left foot numness , foot drop   2/10 - completed abs for back- dried up  local care for decubitus. thoracic surgery to assess left diaphragm    jump in WBC    Recommendations  - completed linezolid course  -completed meropenem  completed abs for back   - surgical f/u appreciated   - yeast in bronch, not clear if true infection, monitor off antifungal for now  - acyclovir completed  - check cultures from bronch  neuro input appreciated  - local care for sacral decubitus, more comfortable     check BC x 2 sets for jump in WBC, if repeat still elevated or any change in status  consider paracentesis ( team is evaluating if necessary)  change lines   check sputum culture       Marla Champion M.D. ,   please reach via teams   If no answer, or after 5PM/ weekends,  then please call  [phone]    Assessment and plan discussed with the primary team .        Marla Champion M.D. ,   please reach via teams   If no answer, or after 5PM/ weekends,  then please call  [phone]    Assessment and plan discussed with the primary team .                 54M with HTN, HLD, ESRD on HD MWF via LUE AVF, ascites (requiring repeat paracenteses q4-6wks), NICM with moderate LV dysfunction w/ EF 35-40%() and CAD s/p atherectomy + SALLIE to D1 (2024) presents to [facility] 2024 as transfer from [facility] (via [facility]) where he presented  with SOB.   In [facility] ED, pt was hypoxic to 86% on RA, trop 1.7K, EKG no new changes, CXR fluid overload. Admitted for AHRF 2/2 fluid overload. Pt received HD on , ,  and . DAPT was resumed, TTE showed improvement in LVEF to 40-45%. Stress test was abnormal with 1mm inferior STD, reversible ischemia in the inferior wall and fixed defects in the lateral, anterior and apical walls with post stress EF of 24%. Pt was then transferred to [facility] for cardiac cath which showed pLAD 70% ISR, mLCx 70%, D1 severe dz. Patient now transferred to [facility] 2024 for CABG.       - underwent  C3L free LIMA-LAD; SVG-Diag; SVG-leftPL   - extubated   - hypotension and ECHO showing Systolic HF/depressed BIV Function, currently supported with /pressors.  Labs this evening showing transaminitis   - hypotensive, febrile and reintubated  1/3 - cultures (+) E coli +ESBL bacteremia   - underwent tracheostomy   - left lung collapse s/p bronchoscopy   - seen by plastic surgery / colorectal for sacral wound, no surgical intervention, no evidence of fistula, BC sent  - c/o right arm  pain, started on acyclovir for positive HSV PCR     doing a little better tissue culture with serratia; bronch also growing some yeast and VREC   back with nodule with some discharge    sacral decubitus less tender, some bloody secretions from trach  - still with drainage from back lesion  s/p I & D of lesion and sacrum   noted left foot numness , foot drop   2/10 - completed abs for back- dried up  local care for decubitus. thoracic surgery to assess left diaphragm    jump in WBC    Recommendations  - completed linezolid course  -completed meropenem  completed abs for back   - surgical f/u appreciated   - yeast in bronch, not clear if true infection, monitor off antifungal for now  - acyclovir completed  - check cultures from bronch  neuro input appreciated  - local care for sacral decubitus, more comfortable     check BC x 2 sets for jump in WBC, if repeat still elevated or any change in status  consider paracentesis ( team is evaluating if necessary)  check CXR- r/o collapse of lung   change lines   check sputum culture       Marla Champion M.D. ,   please reach via teams   If no answer, or after 5PM/ weekends,  then please call  [phone]    Assessment and plan discussed with the primary team .        Marla Champion M.D. ,   please reach via teams   If no answer, or after 5PM/ weekends,  then please call  [phone]    Assessment and plan discussed with the primary team .

## 2025-02-11 NOTE — PROGRESS NOTE ADULT - SUBJECTIVE AND OBJECTIVE BOX
Patient is a 54y old  Male who presents with a chief complaint of CAD s/p CABG (10 Feb 2025 06:41)    Being followed by ID for        Interval history:  although afebrile pt with jump with WBC  no diarrhea  secretions mostly clear   No other acute events        PAST MEDICAL & SURGICAL HISTORY:  Type 2 diabetes mellitus      Hypertension      End stage renal disease  started HD 2/2019 T, Th, Sat via right chest permacath      Bone spur  right shoulder- hx of - sx done      Anemia      Injury of right wrist  hx of at age 14      History of hyperkalemia  before HD- K-6.2 - to repeat in am of sx, pt had HD today      S/P arthroscopy of right shoulder  2010      History of vascular access device  right chest permacath - 2/2019      H/O right wrist surgery  tendons repair - at age 14      AV fistula      H/O ventral hernia repair      S/P cholecystectomy        Allergies    No Known Allergies    Intolerances      Antimicrobials:      MEDICATIONS  (STANDING):  acetylcysteine 10%  Inhalation 4 milliLiter(s) Inhalation every 6 hours  aMIOdarone    Tablet 200 milliGRAM(s) Oral daily  ascorbic acid 500 milliGRAM(s) Oral daily  aspirin  chewable 81 milliGRAM(s) Oral daily  atorvastatin 80 milliGRAM(s) Oral at bedtime  chlorhexidine 0.12% Liquid 15 milliLiter(s) Oral Mucosa every 12 hours  chlorhexidine 2% Cloths 1 Application(s) Topical daily  chlorhexidine 4% Liquid 1 Application(s) Topical <User Schedule>  dextrose 50% Injectable 50 milliLiter(s) IV Push every 15 minutes  epoetin ignacia (EPOGEN) Injectable 11633 Unit(s) IV Push <User Schedule>  insulin lispro (ADMELOG) corrective regimen sliding scale   SubCutaneous every 6 hours  levalbuterol Inhalation 0.63 milliGRAM(s) Inhalation every 8 hours  melatonin 5 milliGRAM(s) Oral at bedtime  methocarbamol 500 milliGRAM(s) Oral every 8 hours  metoprolol tartrate 12.5 milliGRAM(s) Oral <User Schedule>  mirtazapine 7.5 milliGRAM(s) Oral daily  Nephro-elicia 1 Tablet(s) Oral daily  pantoprazole  Injectable 40 milliGRAM(s) IV Push daily  polyethylene glycol 3350 17 Gram(s) Oral two times a day  sodium chloride 0.65% Nasal 1 Spray(s) Both Nostrils every 12 hours  sodium chloride 0.9%. 1000 milliLiter(s) (10 mL/Hr) IV Continuous <Continuous>  traZODone 50 milliGRAM(s) Oral at bedtime      Vital Signs Last 24 Hrs  T(C): 37.2 (02-11-25 @ 07:00), Max: 37.2 (02-11-25 @ 07:00)  T(F): 99 (02-11-25 @ 07:00), Max: 99 (02-11-25 @ 07:00)  HR: 76 (02-11-25 @ 09:00) (57 - 120)  BP: 108/69 (02-11-25 @ 09:00) (81/50 - 159/78)  BP(mean): 84 (02-11-25 @ 09:00) (61 - 122)  RR: 15 (02-11-25 @ 09:00) (12 - 36)  SpO2: 98% (02-11-25 @ 09:00) (93% - 100%)    Physical Exam:    Constitutional well preserved,comfortable,pleasant    HEENT PERRLA EOMI,No pallor or icterus    No oral exudate or erythema    Neck supple no JVD or LN    Chest Good AE,CTA    CVS  S1 S2     Abd distended    Ext No cyanosis clubbing or edema    IV site no erythema tenderness or discharge    Joints no swelling or LOM    CNS AAO X 3 no focal    Lab Data:                          7.6    15.69 )-----------( 229      ( 11 Feb 2025 00:32 )             24.4       02-11    136  |  97  |  32[H]  ----------------------------<  140[H]  4.7   |  25  |  4.69[H]    Ca    8.6      11 Feb 2025 00:32  Phos  3.6     02-11  Mg     2.2     02-11    TPro  6.0  /  Alb  2.7[L]  /  TBili  0.4  /  DBili  x   /  AST  17  /  ALT  9[L]  /  AlkPhos  98  02-11          Vancomycin Level, Trough: 20.9 ug/mL (02-09-25 @ 17:09)      WBC Count: 15.69 (02-11-25 @ 00:32)  WBC Count: 9.11 (02-10-25 @ 00:29)  WBC Count: 8.71 (02-09-25 @ 00:20)  WBC Count: 9.68 (02-08-25 @ 00:39)  WBC Count: 9.48 (02-07-25 @ 00:35)  WBC Count: 9.28 (02-06-25 @ 00:20)  WBC Count: 11.12 (02-05-25 @ 00:19)       Bilirubin Total: 0.4 mg/dL (02-11-25 @ 00:32)  Aspartate Aminotransferase (AST/SGOT): 17 U/L (02-11-25 @ 00:32)  Alanine Aminotransferase (ALT/SGPT): 9 U/L (02-11-25 @ 00:32)  Alkaline Phosphatase: 98 U/L (02-11-25 @ 00:32)    Bilirubin Total: 0.4 mg/dL (02-10-25 @ 00:29)  Aspartate Aminotransferase (AST/SGOT): 18 U/L (02-10-25 @ 00:29)  Alanine Aminotransferase (ALT/SGPT): 11 U/L (02-10-25 @ 00:29)  Alkaline Phosphatase: 97 U/L (02-10-25 @ 00:29)    Bilirubin Total: 0.4 mg/dL (02-09-25 @ 00:21)  Aspartate Aminotransferase (AST/SGOT): 18 U/L (02-09-25 @ 00:21)  Alanine Aminotransferase (ALT/SGPT): 10 U/L (02-09-25 @ 00:21)  Alkaline Phosphatase: 99 U/L (02-09-25 @ 00:21)             Patient is a 54y old  Male who presents with a chief complaint of CAD s/p CABG (10 Feb 2025 06:41)    Being followed by ID for        Interval history:  although afebrile pt with jump with WBC  no diarrhea  secretions mostly clear   buttocks healing   No other acute events        PAST MEDICAL & SURGICAL HISTORY:  Type 2 diabetes mellitus      Hypertension      End stage renal disease  started HD 2/2019 T, Th, Sat via right chest permacath      Bone spur  right shoulder- hx of - sx done      Anemia      Injury of right wrist  hx of at age 14      History of hyperkalemia  before HD- K-6.2 - to repeat in am of sx, pt had HD today      S/P arthroscopy of right shoulder  2010      History of vascular access device  right chest permacath - 2/2019      H/O right wrist surgery  tendons repair - at age 14      AV fistula      H/O ventral hernia repair      S/P cholecystectomy        Allergies    No Known Allergies    Intolerances      Antimicrobials:      MEDICATIONS  (STANDING):  acetylcysteine 10%  Inhalation 4 milliLiter(s) Inhalation every 6 hours  aMIOdarone    Tablet 200 milliGRAM(s) Oral daily  ascorbic acid 500 milliGRAM(s) Oral daily  aspirin  chewable 81 milliGRAM(s) Oral daily  atorvastatin 80 milliGRAM(s) Oral at bedtime  chlorhexidine 0.12% Liquid 15 milliLiter(s) Oral Mucosa every 12 hours  chlorhexidine 2% Cloths 1 Application(s) Topical daily  chlorhexidine 4% Liquid 1 Application(s) Topical <User Schedule>  dextrose 50% Injectable 50 milliLiter(s) IV Push every 15 minutes  epoetin ignacia (EPOGEN) Injectable 21227 Unit(s) IV Push <User Schedule>  insulin lispro (ADMELOG) corrective regimen sliding scale   SubCutaneous every 6 hours  levalbuterol Inhalation 0.63 milliGRAM(s) Inhalation every 8 hours  melatonin 5 milliGRAM(s) Oral at bedtime  methocarbamol 500 milliGRAM(s) Oral every 8 hours  metoprolol tartrate 12.5 milliGRAM(s) Oral <User Schedule>  mirtazapine 7.5 milliGRAM(s) Oral daily  Nephro-elicia 1 Tablet(s) Oral daily  pantoprazole  Injectable 40 milliGRAM(s) IV Push daily  polyethylene glycol 3350 17 Gram(s) Oral two times a day  sodium chloride 0.65% Nasal 1 Spray(s) Both Nostrils every 12 hours  sodium chloride 0.9%. 1000 milliLiter(s) (10 mL/Hr) IV Continuous <Continuous>  traZODone 50 milliGRAM(s) Oral at bedtime      Vital Signs Last 24 Hrs  T(C): 37.2 (02-11-25 @ 07:00), Max: 37.2 (02-11-25 @ 07:00)  T(F): 99 (02-11-25 @ 07:00), Max: 99 (02-11-25 @ 07:00)  HR: 76 (02-11-25 @ 09:00) (57 - 120)  BP: 108/69 (02-11-25 @ 09:00) (81/50 - 159/78)  BP(mean): 84 (02-11-25 @ 09:00) (61 - 122)  RR: 15 (02-11-25 @ 09:00) (12 - 36)  SpO2: 98% (02-11-25 @ 09:00) (93% - 100%)    Physical Exam:    Constitutional well preserved,comfortable,pleasant    HEENT PERRLA EOMI,No pallor or icterus    No oral exudate or erythema    Neck supple no JVD or LN    Chest Good AE,CTA    CVS  S1 S2     Abd distended    Ext No cyanosis clubbing or edema    IV site no erythema tenderness or discharge    Joints no swelling or LOM  back lesion dry  CNS AAO X 3 no focal    Lab Data:                          7.6    15.69 )-----------( 229      ( 11 Feb 2025 00:32 )             24.4       02-11    136  |  97  |  32[H]  ----------------------------<  140[H]  4.7   |  25  |  4.69[H]    Ca    8.6      11 Feb 2025 00:32  Phos  3.6     02-11  Mg     2.2     02-11    TPro  6.0  /  Alb  2.7[L]  /  TBili  0.4  /  DBili  x   /  AST  17  /  ALT  9[L]  /  AlkPhos  98  02-11          Vancomycin Level, Trough: 20.9 ug/mL (02-09-25 @ 17:09)      WBC Count: 15.69 (02-11-25 @ 00:32)  WBC Count: 9.11 (02-10-25 @ 00:29)  WBC Count: 8.71 (02-09-25 @ 00:20)  WBC Count: 9.68 (02-08-25 @ 00:39)  WBC Count: 9.48 (02-07-25 @ 00:35)  WBC Count: 9.28 (02-06-25 @ 00:20)  WBC Count: 11.12 (02-05-25 @ 00:19)       Bilirubin Total: 0.4 mg/dL (02-11-25 @ 00:32)  Aspartate Aminotransferase (AST/SGOT): 17 U/L (02-11-25 @ 00:32)  Alanine Aminotransferase (ALT/SGPT): 9 U/L (02-11-25 @ 00:32)  Alkaline Phosphatase: 98 U/L (02-11-25 @ 00:32)    Bilirubin Total: 0.4 mg/dL (02-10-25 @ 00:29)  Aspartate Aminotransferase (AST/SGOT): 18 U/L (02-10-25 @ 00:29)  Alanine Aminotransferase (ALT/SGPT): 11 U/L (02-10-25 @ 00:29)  Alkaline Phosphatase: 97 U/L (02-10-25 @ 00:29)    Bilirubin Total: 0.4 mg/dL (02-09-25 @ 00:21)  Aspartate Aminotransferase (AST/SGOT): 18 U/L (02-09-25 @ 00:21)  Alanine Aminotransferase (ALT/SGPT): 10 U/L (02-09-25 @ 00:21)  Alkaline Phosphatase: 99 U/L (02-09-25 @ 00:21)

## 2025-02-11 NOTE — PROGRESS NOTE ADULT - ASSESSMENT
54M with PMH of HTN, HLD, ESRD on HD MWF via LUE AVF, ascites (requiring repeat paracenteses q4-6wks), NICM with moderate LV dysfunction w/ EF 35-40%(2023) and CAD s/p atherectomy + SALLIE to D1(4/11/2024) presents to [facility] transferred from [facility] for CABG eval  Nephro on [facility] for HD    ESRD on HD   Regular schedule MWF   Access LUE AVF  Center [facility]  Nephrologist Dr. Bailey  S/p HD on 01/29, 2/3 and 02/05  S/p HD on 02/07   s/p hd 2/10 uf 3L  pUF today   Keep MAP>65  Renal Diet  Consent in physical chart    Hyper/Hypokalemia  Monitor K, will change dialysate fluid as needed    HTN  bp acceptable  monitor bp     CKD MBD  Can send a PTH  Ca and Phos daily    Anemia  CRISTIANE with HD  Transfuse if hgb <7    CAD with multivessel disease  s/p CABG 12/30  CTICU following    Hypoxic Resp failure requiring Trach  Per surgery  S/p bronchoscopy, pulm following

## 2025-02-11 NOTE — PROGRESS NOTE ADULT - ASSESSMENT
54M with PMH of HTN, HLD, ESRD on HD MWF via LUE AVF, ascites (requiring repeat paracenteses q4-6wks), NICM with moderate LV dysfunction w/ EF 35-40%() and CAD s/p atherectomy + SALLIE to D1(2024) presents to [facility] transferred from [facility]. Patient initially presented to [facility] ED with shortness of breath. Of note he was recently admitted from - for acute cholecystitis s/p cholecystectomy. In [facility] ED, pt was hypoxic to 86% on RA, trop 1.7K, EKG no new changes, CXR fluid overload. Admitted for AHRF 2/2 fluid overload. Pt received HD on , ,  and . DAPT was resumed, TTE showed improvement in LVEF to 40-45%. Stress test was abnormal with 1mm inferior STD, reversible ischemia in the inferior wall and fixed defects in the lateral, anterior and apical walls with post stress EF of 24%.     Pt was then transferred to [facility] for cardiac cath which showed pLAD 70% ISR, mLCx 70%, D1 severe dz. Patient now transferred to [facility] CTS Dr. Rivers for CABG eval.# CAD s/p NSTEMI  Hx CAD s/p PCI LAD   Presented with ADHF elevated troponins  Positive NST1-Cath- pLAD 70% ISR, mLCx 70%, D1 severe dz.   TTE EF 35% Severe TRWas on Plavix-[id number] 321 been off Plavix since -POD#1-C3L free LIMA-LAD; SVG-Diag; VQJ-hezrAK-Xkrhaprkz on  Sinus rhythm,Amio for AF prophylaxis  -OOB off  Sinus rhythm   -POD#3-On /pressors-EF 25-30% RV failure with transaminitis-Sinus Hxixbt44/03-POD#4-Was intubated for hypoxia-gradual hypotension on /pressors had IABP inserted this morning  -POD#5-s/p IABP insertion, sepsis/septic shock due to GNR/ECOLI HD cath placed   Bronched yesterday 1/3 - minimal secretions with rust-tinged sputum   ESBL-E Coli bacteremia on meropenam    - POD#7 Atrial Fib ,remains intubated weaning IABP 1:3,off pressors on CRRT  -IABP removed.Remains on vent-Alex 10ppm,off Levo- CVP 10 / MAP 71 / Hct 25.6% / Lactate 1.7-Atrial Fibrillation  -Intubated on Alex 10ppm, gtt, BP better-AF~~90's on Amio-had bronch still febrile Tmax 32807/-Extubated awake alert on HFNC AF,on Dobutrex-CRRT,Cultures no growth    01/10-OOB on BiPAP Sinus Rhythm on -SR/ MAP 57/ CVP 10/  Hct 26.7 / Lact 1.4  -s/p EDU/DCCV-Sinus rhythm on -SR / MAP 52 / CVP 12/ Hct 25.8 / Lact 0.7-had bronch with BAL Left lung  -Sinus rhythm on -for repeat Bronch-SR / MAP 67 / CVP 11 / Hct 27.4% / Lact 0.8  -s/p Trach on  TTE EF 55%-SR / CVP 2 / MAP 63 / Hct 25.9% / Lactate 0.9  -Awake on Trach collar off  c/o buttock pain had bronch yesterday-BAL-Sinus rhythm  -Sinus rhythm on vent at night-BP stable may need to increase hydrallazine 25 mg q8  -TTE EF 60% still needs Vent support at night Bradycardic trial of Bryce now back on   -Awake alert Sinus rhythm BP elevated  remains on ,Nocturnal vent support  resume Hydralazine 25mg q8  -Reverted to AF rvr  -s/p BRonc/BAL debridement sacral decub  -Awake noted AF RVR no further bradyarrhythmias-Started BBlockers  02/10-Persistent AF tolerating Amio BBlockers Pericardial Effusion decreased consider resuming AC      # Acute on Chronic Systolic Heart Failure now improved  EF-35% ,severe TR  Had HD post op  GDMT post op  LVEF improved post bypass but now at 23-30%-BiV dysfunction on  now off pressors  -TTE EF 40%,trace effusion  ECG c/w pericarditis-was on colchicine   01/15-TTE EF 55%  -TTE EF 60%   -Normal LV systolic function Moderate pericardial effusion  -On TC-HF and Vent support at nights   02/10-Vent HS with T Collar trial Ambulated Remains in AF  Repeat TTE Normal LV Systolic function Small Pericardial effusion decreased from 2/3        Vascular evaluated  AVGraft /?steal causing RV failure-'' Very low suspicion of steal Sd and AVF causing current clinical state. ''   VA Duplex Hemodialysis Access, Left (25 @ 13:35) >   Arterial flow volume of 1301 mL/m, and the venous flow volume of  1492 mL/m are consistent with a normally functioning dialysis access  fistula.      # Ascites  Hx chronic ascites  Has drainage q4-6 weeks  Last drained -4600mL  ? ascites related to severe TR  Had sznatfwuihnh-Fslgqvy-lx growth   CT Abdomen 01/10-Large volume ascites-  US abdomen--Small to moderate volume abdominal/pelvic ascites      # ESRD  Now off CRRT transition to HD

## 2025-02-11 NOTE — PROGRESS NOTE ADULT - SUBJECTIVE AND OBJECTIVE BOX
MR#[id number]  PATIENT NAME:RAAD CANO    DATE OF SERVICE: 02-11-25 @ 0930  Patient was seen and examined by Solo Quick MD on    02-11-25 @ 0930  Interim events noted.Consultant notes ,Labs,Telemetry reviewed by me       HOSPITAL COURSE: HPI:  54M with PMH of HTN, HLD, ESRD on HD MWF via LUE AVF, ascites (requiring repeat paracenteses q4-6wks), NICM with moderate LV dysfunction w/ EF 35-40%(2023) and CAD s/p atherectomy + SALLIE to D1(4/11/2024) presents to [facility] transferred from [facility]. Patient initially presented to [facility] ED with shortness of breath. Of note he was recently admitted from 09/27-12/02 for acute cholecystitis s/p cholecystectomy. In [facility] ED, pt was hypoxic to 86% on RA, trop 1.7K, EKG no new changes, CXR fluid overload. Admitted for AHRF 2/2 fluid overload. Pt received HD on 12/18, 12/19, 12/20 and 12/22. DAPT was resumed, TTE showed improvement in LVEF to 40-45%. Stress test was abnormal with 1mm inferior STD, reversible ischemia in the inferior wall and fixed defects in the lateral, anterior and apical walls with post stress EF of 24%. Pt was then transferred to [facility] for cardiac cath which showed pLAD 70% ISR, mLCx 70%, D1 severe dz. Patient now transferred to [facility] CTS Dr. Rivers for CABG eval. Patient denies any current SOB or chest pain on admission. Otherwise denies headaches, abdominal pain, urinary or bowel changes, fevers, chills, N/V/D, sick contacts.  (24 Dec 2024 05:07)      INTERIM EVENTS:Patient seen at bedside ,interim events noted.      PMH -reviewed admission note, no change since admission  HEART FAILURE: Acute[ ]Chronic[ ] Systolic[ ] Diastolic[ ] Combined Systolic and Diastolic[ ]  CAD[ ] CABG[ ] PCI[ ]  DEVICES[ ] PPM[ ] ICD[ ] ILR[ ]  ATRIAL FIBRILLATION[ ] Paroxysmal[ ] Permanent[ ] CHADS2-[  ]  GHASSAN[ ] CKD1[ ] CKD2[ ] CKD3[ ] CKD4[ ] ESRD[ ]  COPD[ ] HTN[ ]   DM[ ] Type1[ ] Type 2[ ]   CVA[ ] Paresis[ ]    AMBULATION: Assisted[ ] Cane/walker[ ] Independent[ ]    MEDICATIONS  (STANDING):  acetylcysteine 10%  Inhalation 4 milliLiter(s) Inhalation every 6 hours  albumin human  5% IVPB 250 milliLiter(s) IV Intermittent once  aMIOdarone    Tablet 200 milliGRAM(s) Oral daily  ascorbic acid 500 milliGRAM(s) Oral daily  aspirin  chewable 81 milliGRAM(s) Oral daily  atorvastatin 80 milliGRAM(s) Oral at bedtime  chlorhexidine 0.12% Liquid 15 milliLiter(s) Oral Mucosa every 12 hours  chlorhexidine 2% Cloths 1 Application(s) Topical daily  chlorhexidine 4% Liquid 1 Application(s) Topical <User Schedule>  dextrose 50% Injectable 50 milliLiter(s) IV Push every 15 minutes  epoetin ignacia (EPOGEN) Injectable 87949 Unit(s) IV Push <User Schedule>  ferrous    sulfate Liquid 300 milliGRAM(s) Enteral Tube daily  insulin lispro (ADMELOG) corrective regimen sliding scale   SubCutaneous every 6 hours  levalbuterol Inhalation 0.63 milliGRAM(s) Inhalation every 8 hours  melatonin 5 milliGRAM(s) Oral at bedtime  methocarbamol 500 milliGRAM(s) Oral every 8 hours  metoprolol tartrate 12.5 milliGRAM(s) Oral <User Schedule>  mirtazapine 7.5 milliGRAM(s) Oral daily  Nephro-elicia 1 Tablet(s) Oral daily  pantoprazole  Injectable 40 milliGRAM(s) IV Push daily  polyethylene glycol 3350 17 Gram(s) Oral two times a day  sodium chloride 0.65% Nasal 1 Spray(s) Both Nostrils every 12 hours  sodium chloride 0.9%. 1000 milliLiter(s) (10 mL/Hr) IV Continuous <Continuous>  traZODone 50 milliGRAM(s) Oral at bedtime    MEDICATIONS  (PRN):  acetaminophen     Tablet .. 650 milliGRAM(s) Oral every 6 hours PRN Mild Pain (1 - 3)  lidocaine/prilocaine Cream 1 Application(s) Topical daily PRN pre-HD  oxyCODONE    IR 5 milliGRAM(s) Oral every 4 hours PRN Moderate Pain (4 - 6)  oxyCODONE    IR 10 milliGRAM(s) Oral every 4 hours PRN Severe Pain (7 - 10)  sodium chloride 0.9% lock flush 10 milliLiter(s) IV Push every 1 hour PRN Pre/post blood products, medications, blood draw, and to maintain line patency            REVIEW OF SYSTEMS:  Constitutional: [ ] fever, [ ]weight loss,  [ ]fatigue [ ]weight gain  Eyes: [ ] visual changes  Respiratory: [ ]shortness of breath;  [ ] cough, [ ]wheezing, [ ]chills, [ ]hemoptysis  Cardiovascular: [ ] chest pain, [ ]palpitations, [ ]dizziness,  [ ]leg swelling[ ]orthopnea[ ]PND  Gastrointestinal: [ ] abdominal pain, [ ]nausea, [ ]vomiting,  [ ]diarrhea [ ]Constipation [ ]Melena  Genitourinary: [ ] dysuria, [ ] hematuria [ ]Vigil  Neurologic: [ ] headaches [ ] tremors[ ]weakness [ ]Paralysis Right[ ] Left[ ]  Skin: [ ] itching, [ ]burning, [ ] rashes  Endocrine: [ ] heat or cold intolerance  Musculoskeletal: [ ] joint pain or swelling; [ ] muscle, back, or extremity pain  Psychiatric: [ ] depression, [ ]anxiety, [ ]mood swings, or [ ]difficulty sleeping  Hematologic: [ ] easy bruising, [ ] bleeding gums    [ ] All remaining systems negative except as per above.   [ ]Unable to obtain.  [x] No change in ROS since admission      Vital Signs Last 24 Hrs  T(C): 36.7 (11 Feb 2025 20:00), Max: 37.3 (11 Feb 2025 11:00)  T(F): 98 (11 Feb 2025 20:00), Max: 99.1 (11 Feb 2025 11:00)  HR: 97 (11 Feb 2025 21:00) (57 - 110)  BP: 145/69 (11 Feb 2025 21:00) (81/50 - 177/74)  BP(mean): 98 (11 Feb 2025 21:00) (61 - 489)  RR: 18 (11 Feb 2025 21:00) (12 - 36)  SpO2: 100% (11 Feb 2025 21:00) (71% - 100%)    Parameters below as of 11 Feb 2025 20:00  Patient On (Oxygen Delivery Method): nasal cannula, high flow  O2 Flow (L/min): 40  O2 Concentration (%): 50  I&O's Summary    10 Feb 2025 07:01  -  11 Feb 2025 07:00  --------------------------------------------------------  IN: 2085 mL / OUT: 3000 mL / NET: -915 mL    11 Feb 2025 07:01  -  11 Feb 2025 21:52  --------------------------------------------------------  IN: 1055 mL / OUT: 4550 mL / NET: -3495 mL        PHYSICAL EXAM:  General: No acute distress BMI-  HEENT: EOMI, PERRL  Neck: Supple, [ ] JVD  Lungs: Equal air entry bilaterally; [ ] rales [ ] wheezing [ ] rhonchi  Heart: Regular rate and rhythm; [x ] murmur   2/6 [ x] systolic [ ] diastolic [ ] radiation[ ] rubs [ ]  gallops  Abdomen: Nontender, bowel sounds present  Extremities: No clubbing, cyanosis, [ ] edema [ ]Pulses  equal and intact  Nervous system:  Alert & Oriented X3, no focal deficits  Psychiatric: Normal affect  Skin: No rashes or lesions    LABS:  02-11    136  |  97  |  32[H]  ----------------------------<  140[H]  4.7   |  25  |  4.69[H]    Ca    8.6      11 Feb 2025 00:32  Phos  3.6     02-11  Mg     2.2     02-11    TPro  6.0  /  Alb  2.7[L]  /  TBili  0.4  /  DBili  x   /  AST  17  /  ALT  9[L]  /  AlkPhos  98  02-11    Creatinine Trend: 4.69<--, 5.93<--, 5.13<--, 4.28<--, 4.37<--, 4.45<--                        8.0    16.96 )-----------( 242      ( 11 Feb 2025 19:34 )             26.0     PT/INR - ( 11 Feb 2025 00:32 )   PT: 16.7 sec;   INR: 1.47 ratio         PTT - ( 11 Feb 2025 00:32 )  PTT:33.5 sec

## 2025-02-12 DIAGNOSIS — J98.6 DISORDERS OF DIAPHRAGM: ICD-10-CM

## 2025-02-12 LAB
ALBUMIN SERPL ELPH-MCNC: 3 G/DL — LOW (ref 3.3–5)
ALBUMIN SERPL ELPH-MCNC: 3.3 G/DL — SIGNIFICANT CHANGE UP (ref 3.3–5)
ALP SERPL-CCNC: 107 U/L — SIGNIFICANT CHANGE UP (ref 40–120)
ALP SERPL-CCNC: 117 U/L — SIGNIFICANT CHANGE UP (ref 40–120)
ALT FLD-CCNC: 10 U/L — SIGNIFICANT CHANGE UP (ref 10–45)
ALT FLD-CCNC: 12 U/L — SIGNIFICANT CHANGE UP (ref 10–45)
ANION GAP SERPL CALC-SCNC: 12 MMOL/L — SIGNIFICANT CHANGE UP (ref 5–17)
ANION GAP SERPL CALC-SCNC: 14 MMOL/L — SIGNIFICANT CHANGE UP (ref 5–17)
ANION GAP SERPL CALC-SCNC: 17 MMOL/L — SIGNIFICANT CHANGE UP (ref 5–17)
APTT BLD: 33.5 SEC — SIGNIFICANT CHANGE UP (ref 24.5–35.6)
APTT BLD: 35.1 SEC — SIGNIFICANT CHANGE UP (ref 24.5–35.6)
AST SERPL-CCNC: 14 U/L — SIGNIFICANT CHANGE UP (ref 10–40)
BASOPHILS # BLD AUTO: 0.03 K/UL — SIGNIFICANT CHANGE UP (ref 0–0.2)
BASOPHILS NFR BLD AUTO: 0.2 % — SIGNIFICANT CHANGE UP (ref 0–2)
BILIRUB SERPL-MCNC: 0.4 MG/DL — SIGNIFICANT CHANGE UP (ref 0.2–1.2)
BILIRUB SERPL-MCNC: 0.5 MG/DL — SIGNIFICANT CHANGE UP (ref 0.2–1.2)
BUN SERPL-MCNC: 41 MG/DL — HIGH (ref 7–23)
BUN SERPL-MCNC: 46 MG/DL — HIGH (ref 7–23)
BUN SERPL-MCNC: 47 MG/DL — HIGH (ref 7–23)
CALCIUM SERPL-MCNC: 8.5 MG/DL — SIGNIFICANT CHANGE UP (ref 8.4–10.5)
CALCIUM SERPL-MCNC: 9.2 MG/DL — SIGNIFICANT CHANGE UP (ref 8.4–10.5)
CHLORIDE SERPL-SCNC: 94 MMOL/L — LOW (ref 96–108)
CHLORIDE SERPL-SCNC: 94 MMOL/L — LOW (ref 96–108)
CHLORIDE SERPL-SCNC: 95 MMOL/L — LOW (ref 96–108)
CO2 SERPL-SCNC: 22 MMOL/L — SIGNIFICANT CHANGE UP (ref 22–31)
CO2 SERPL-SCNC: 25 MMOL/L — SIGNIFICANT CHANGE UP (ref 22–31)
CO2 SERPL-SCNC: 27 MMOL/L — SIGNIFICANT CHANGE UP (ref 22–31)
CREAT SERPL-MCNC: 5.48 MG/DL — HIGH (ref 0.5–1.3)
CREAT SERPL-MCNC: 5.62 MG/DL — HIGH (ref 0.5–1.3)
CREAT SERPL-MCNC: 5.8 MG/DL — HIGH (ref 0.5–1.3)
CULTURE RESULTS: ABNORMAL
EGFR: 11 ML/MIN/1.73M2 — LOW
EGFR: 12 ML/MIN/1.73M2 — LOW
EGFR: 12 ML/MIN/1.73M2 — LOW
EOSINOPHIL # BLD AUTO: 1.05 K/UL — HIGH (ref 0–0.5)
GLUCOSE SERPL-MCNC: 103 MG/DL — HIGH (ref 70–99)
GLUCOSE SERPL-MCNC: 110 MG/DL — HIGH (ref 70–99)
GLUCOSE SERPL-MCNC: 166 MG/DL — HIGH (ref 70–99)
HCT VFR BLD CALC: 24.4 % — LOW (ref 39–50)
HGB BLD-MCNC: 7.5 G/DL — LOW (ref 13–17)
IMM GRANULOCYTES NFR BLD AUTO: 0.9 % — SIGNIFICANT CHANGE UP (ref 0–0.9)
INR BLD: 1.34 RATIO — HIGH (ref 0.85–1.16)
LYMPHOCYTES # BLD AUTO: 0.57 K/UL — LOW (ref 1–3.3)
LYMPHOCYTES # BLD AUTO: 3.4 % — LOW (ref 13–44)
MAGNESIUM SERPL-MCNC: 2.6 MG/DL — SIGNIFICANT CHANGE UP (ref 1.6–2.6)
MCHC RBC-ENTMCNC: 30.1 PG — SIGNIFICANT CHANGE UP (ref 27–34)
MCHC RBC-ENTMCNC: 30.7 G/DL — LOW (ref 32–36)
MCV RBC AUTO: 98 FL — SIGNIFICANT CHANGE UP (ref 80–100)
MONOCYTES # BLD AUTO: 1.24 K/UL — HIGH (ref 0–0.9)
MONOCYTES NFR BLD AUTO: 7.4 % — SIGNIFICANT CHANGE UP (ref 2–14)
NEUTROPHILS # BLD AUTO: 13.8 K/UL — HIGH (ref 1.8–7.4)
NEUTROPHILS NFR BLD AUTO: 81.9 % — HIGH (ref 43–77)
NRBC BLD AUTO-RTO: 0 /100 WBCS — SIGNIFICANT CHANGE UP (ref 0–0)
PHOSPHATE SERPL-MCNC: 4.1 MG/DL — SIGNIFICANT CHANGE UP (ref 2.5–4.5)
PLATELET # BLD AUTO: 237 K/UL — SIGNIFICANT CHANGE UP (ref 150–400)
POTASSIUM SERPL-MCNC: 5.5 MMOL/L — HIGH (ref 3.5–5.3)
POTASSIUM SERPL-MCNC: 5.9 MMOL/L — HIGH (ref 3.5–5.3)
POTASSIUM SERPL-MCNC: 7.2 MMOL/L — CRITICAL HIGH (ref 3.5–5.3)
POTASSIUM SERPL-SCNC: 5.5 MMOL/L — HIGH (ref 3.5–5.3)
POTASSIUM SERPL-SCNC: 5.9 MMOL/L — HIGH (ref 3.5–5.3)
POTASSIUM SERPL-SCNC: 7.2 MMOL/L — CRITICAL HIGH (ref 3.5–5.3)
PROT SERPL-MCNC: 6.4 G/DL — SIGNIFICANT CHANGE UP (ref 6–8.3)
PROT SERPL-MCNC: 7.1 G/DL — SIGNIFICANT CHANGE UP (ref 6–8.3)
PROTHROM AB SERPL-ACNC: 15.4 SEC — HIGH (ref 9.9–13.4)
RBC # BLD: 2.49 M/UL — LOW (ref 4.2–5.8)
RBC # FLD: 22.7 % — HIGH (ref 10.3–14.5)
SODIUM SERPL-SCNC: 133 MMOL/L — LOW (ref 135–145)
SODIUM SERPL-SCNC: 134 MMOL/L — LOW (ref 135–145)
SPECIMEN SOURCE: SIGNIFICANT CHANGE UP
WBC # BLD: 16.84 K/UL — HIGH (ref 3.8–10.5)
WBC # FLD AUTO: 16.84 K/UL — HIGH (ref 3.8–10.5)

## 2025-02-12 PROCEDURE — 76000 FLUOROSCOPY <1 HR PHYS/QHP: CPT | Mod: 26

## 2025-02-12 PROCEDURE — 71045 X-RAY EXAM CHEST 1 VIEW: CPT | Mod: 26

## 2025-02-12 PROCEDURE — 99291 CRITICAL CARE FIRST HOUR: CPT

## 2025-02-12 PROCEDURE — 99232 SBSQ HOSP IP/OBS MODERATE 35: CPT

## 2025-02-12 PROCEDURE — 71045 X-RAY EXAM CHEST 1 VIEW: CPT | Mod: 26,77,76

## 2025-02-12 PROCEDURE — G0545: CPT

## 2025-02-12 PROCEDURE — 99232 SBSQ HOSP IP/OBS MODERATE 35: CPT | Mod: FS

## 2025-02-12 RX ORDER — SODIUM BICARBONATE 1 MEQ/ML
50 SYRINGE (ML) INTRAVENOUS ONCE
Refills: 0 | Status: COMPLETED | OUTPATIENT
Start: 2025-02-12 | End: 2025-02-12

## 2025-02-12 RX ORDER — MEROPENEM 1 G/30ML
500 INJECTION INTRAVENOUS EVERY 24 HOURS
Refills: 0 | Status: DISCONTINUED | OUTPATIENT
Start: 2025-02-12 | End: 2025-02-17

## 2025-02-12 RX ORDER — HEPARIN SODIUM 1000 [USP'U]/ML
500 INJECTION INTRAVENOUS; SUBCUTANEOUS
Qty: 25000 | Refills: 0 | Status: DISCONTINUED | OUTPATIENT
Start: 2025-02-12 | End: 2025-02-14

## 2025-02-12 RX ORDER — CALCIUM GLUCONATE 20 MG/ML
2 INJECTION, SOLUTION INTRAVENOUS ONCE
Refills: 0 | Status: COMPLETED | OUTPATIENT
Start: 2025-02-12 | End: 2025-02-12

## 2025-02-12 RX ADMIN — OXYCODONE HYDROCHLORIDE 10 MILLIGRAM(S): 30 TABLET ORAL at 10:40

## 2025-02-12 RX ADMIN — METHOCARBAMOL 500 MILLIGRAM(S): 500 TABLET, FILM COATED ORAL at 05:14

## 2025-02-12 RX ADMIN — POLYETHYLENE GLYCOL 3350 17 GRAM(S): 17 POWDER, FOR SOLUTION ORAL at 05:14

## 2025-02-12 RX ADMIN — Medication 50 MILLIGRAM(S): at 21:09

## 2025-02-12 RX ADMIN — Medication 50 MILLIEQUIVALENT(S): at 21:03

## 2025-02-12 RX ADMIN — Medication 5 MILLIGRAM(S): at 21:09

## 2025-02-12 RX ADMIN — ATORVASTATIN CALCIUM 80 MILLIGRAM(S): 80 TABLET, FILM COATED ORAL at 21:09

## 2025-02-12 RX ADMIN — CALCIUM GLUCONATE 200 GRAM(S): 20 INJECTION, SOLUTION INTRAVENOUS at 21:03

## 2025-02-12 RX ADMIN — OXYCODONE HYDROCHLORIDE 10 MILLIGRAM(S): 30 TABLET ORAL at 23:12

## 2025-02-12 RX ADMIN — AMIODARONE HYDROCHLORIDE 200 MILLIGRAM(S): 50 INJECTION, SOLUTION INTRAVENOUS at 05:14

## 2025-02-12 RX ADMIN — Medication 81 MILLIGRAM(S): at 12:39

## 2025-02-12 RX ADMIN — OXYCODONE HYDROCHLORIDE 10 MILLIGRAM(S): 30 TABLET ORAL at 11:10

## 2025-02-12 RX ADMIN — MEROPENEM 100 MILLIGRAM(S): 1 INJECTION INTRAVENOUS at 18:43

## 2025-02-12 RX ADMIN — Medication 1 APPLICATION(S): at 21:18

## 2025-02-12 RX ADMIN — Medication 300 MILLIGRAM(S): at 13:03

## 2025-02-12 RX ADMIN — METHOCARBAMOL 500 MILLIGRAM(S): 500 TABLET, FILM COATED ORAL at 14:05

## 2025-02-12 RX ADMIN — HEPARIN SODIUM 5 UNIT(S)/HR: 1000 INJECTION INTRAVENOUS; SUBCUTANEOUS at 12:39

## 2025-02-12 RX ADMIN — Medication 500 MILLIGRAM(S): at 12:38

## 2025-02-12 RX ADMIN — Medication 1 TABLET(S): at 12:38

## 2025-02-12 RX ADMIN — HEPARIN SODIUM 5 UNIT(S)/HR: 1000 INJECTION INTRAVENOUS; SUBCUTANEOUS at 20:01

## 2025-02-12 RX ADMIN — Medication 15 MILLILITER(S): at 19:18

## 2025-02-12 RX ADMIN — METHOCARBAMOL 500 MILLIGRAM(S): 500 TABLET, FILM COATED ORAL at 21:10

## 2025-02-12 RX ADMIN — OXYCODONE HYDROCHLORIDE 10 MILLIGRAM(S): 30 TABLET ORAL at 22:42

## 2025-02-12 RX ADMIN — Medication 40 MILLIGRAM(S): at 12:38

## 2025-02-12 RX ADMIN — METOPROLOL SUCCINATE 12.5 MILLIGRAM(S): 50 TABLET, EXTENDED RELEASE ORAL at 09:09

## 2025-02-12 RX ADMIN — MIRTAZAPINE 7.5 MILLIGRAM(S): 30 TABLET, FILM COATED ORAL at 12:39

## 2025-02-12 RX ADMIN — METOPROLOL SUCCINATE 12.5 MILLIGRAM(S): 50 TABLET, EXTENDED RELEASE ORAL at 21:09

## 2025-02-12 RX ADMIN — LEVALBUTEROL HYDROCHLORIDE 0.63 MILLIGRAM(S): 1.25 SOLUTION RESPIRATORY (INHALATION) at 05:29

## 2025-02-12 RX ADMIN — EPOETIN ALFA 10000 UNIT(S): 10000 SOLUTION INTRAVENOUS; SUBCUTANEOUS at 12:15

## 2025-02-12 RX ADMIN — POLYETHYLENE GLYCOL 3350 17 GRAM(S): 17 POWDER, FOR SOLUTION ORAL at 21:08

## 2025-02-12 RX ADMIN — ACETYLCYSTEINE 4 MILLILITER(S): 200 INHALANT RESPIRATORY (INHALATION) at 05:32

## 2025-02-12 NOTE — PROGRESS NOTE ADULT - SUBJECTIVE AND OBJECTIVE BOX
Patient is a 54y old  Male who presents with a chief complaint of CAD s/p CABG (2025 06:27)      Vital Signs Last 24 Hrs  T(C): 36.7 (25 @ 08:00), Max: 37.3 (25 @ 11:00)  T(F): 98.1 (25 @ 08:00), Max: 99.1 (25 @ 11:00)  HR: 85 (25 @ 08:54) (55 - 110)  BP: 125/62 (25 @ 08:00) (94/52 - 177/74)  RR: 10 (25 @ 08:00) (10 - 29)  SpO2: 98% (25 @ 08:54) (71% - 100%)          Mode: HFTC, FiO2: 50      25 @ 07:01  -  25 @ 07:00  --------------------------------------------------------  IN: 1915 mL / OUT: 4550 mL / NET: -2635 mL      Daily Weight in k.7 (2025 00:00)                          7.5    16.84 )-----------( 237      ( 2025 00:36 )             24.4     134[L]  |  95[L]  |  41[H]  ----------------------------<  166[H]  5.5[H]   |  27  |  5.48[H]      AST  14  /  ALT  10  /  AlkPhos  107            PHYSICAL EXAM:  General: No acute distress  Respiratory: Nonlabored, decreased ausculation on b/l bases, trach in place  Cardiovascular: RRR  Abdominal: Soft, nondistended, nontender.   Extremities: Warm      MEDICATIONS  acetaminophen     Tablet .. 650 milliGRAM(s) Oral every 6 hours PRN  acetylcysteine 10%  Inhalation 4 milliLiter(s) Inhalation every 6 hours  albumin human  5% IVPB 250 milliLiter(s) IV Intermittent once  aMIOdarone    Tablet 200 milliGRAM(s) Oral daily  ascorbic acid 500 milliGRAM(s) Oral daily  aspirin  chewable 81 milliGRAM(s) Oral daily  atorvastatin 80 milliGRAM(s) Oral at bedtime  chlorhexidine 0.12% Liquid 15 milliLiter(s) Oral Mucosa every 12 hours  chlorhexidine 2% Cloths 1 Application(s) Topical daily  chlorhexidine 4% Liquid 1 Application(s) Topical <User Schedule>  dextrose 50% Injectable 50 milliLiter(s) IV Push every 15 minutes  epoetin ignacia (EPOGEN) Injectable 20565 Unit(s) IV Push <User Schedule>  ferrous    sulfate Liquid 300 milliGRAM(s) Enteral Tube daily  insulin lispro (ADMELOG) corrective regimen sliding scale   SubCutaneous every 6 hours  levalbuterol Inhalation 0.63 milliGRAM(s) Inhalation every 8 hours  lidocaine/prilocaine Cream 1 Application(s) Topical daily PRN  melatonin 5 milliGRAM(s) Oral at bedtime  methocarbamol 500 milliGRAM(s) Oral every 8 hours  metoprolol tartrate 12.5 milliGRAM(s) Oral <User Schedule>  mirtazapine 7.5 milliGRAM(s) Oral daily  Nephro-elicia 1 Tablet(s) Oral daily  oxyCODONE    IR 5 milliGRAM(s) Oral every 4 hours PRN  oxyCODONE    IR 10 milliGRAM(s) Oral every 4 hours PRN  pantoprazole  Injectable 40 milliGRAM(s) IV Push daily  polyethylene glycol 3350 17 Gram(s) Oral two times a day  sodium chloride 0.65% Nasal 1 Spray(s) Both Nostrils every 12 hours  sodium chloride 0.9% lock flush 10 milliLiter(s) IV Push every 1 hour PRN  sodium chloride 0.9%. 1000 milliLiter(s) IV Continuous <Continuous>  traZODone 50 milliGRAM(s) Oral at bedtime

## 2025-02-12 NOTE — PROGRESS NOTE ADULT - SUBJECTIVE AND OBJECTIVE BOX
MR#[id number]  PATIENT NAME:RAAD CANO    DATE OF SERVICE: 02-12-25 @ 06:27  Patient was seen and examined by Solo Quick MD on    02-12-25 @ 06:27 .  Interim events noted.Consultant notes ,Labs,Telemetry reviewed by me       HOSPITAL COURSE: HPI:  54M with PMH of HTN, HLD, ESRD on HD MWF via LUE AVF, ascites (requiring repeat paracenteses q4-6wks), NICM with moderate LV dysfunction w/ EF 35-40%(2023) and CAD s/p atherectomy + SALLIE to D1(4/11/2024) presents to [facility] transferred from [facility]. Patient initially presented to [facility] ED with shortness of breath. Of note he was recently admitted from 09/27-12/02 for acute cholecystitis s/p cholecystectomy. In [facility] ED, pt was hypoxic to 86% on RA, trop 1.7K, EKG no new changes, CXR fluid overload. Admitted for AHRF 2/2 fluid overload. Pt received HD on 12/18, 12/19, 12/20 and 12/22. DAPT was resumed, TTE showed improvement in LVEF to 40-45%. Stress test was abnormal with 1mm inferior STD, reversible ischemia in the inferior wall and fixed defects in the lateral, anterior and apical walls with post stress EF of 24%. Pt was then transferred to [facility] for cardiac cath which showed pLAD 70% ISR, mLCx 70%, D1 severe dz. Patient now transferred to [facility] CTS Dr. Rivers for CABG eval. Patient denies any current SOB or chest pain on admission. Otherwise denies headaches, abdominal pain, urinary or bowel changes, fevers, chills, N/V/D, sick contacts.  (24 Dec 2024 05:07)      INTERIM EVENTS:Patient seen at bedside ,interim events noted.    12/23 Cardiac cath  pLAD 70% ISR, mLCx 70%, D1 severe dz.   12/31-POD#1-C3L free LIMA-LAD; SVG-Diag; SVG-leftPL Awake OOB extubated Sinus rhythm on Dobutamine gtt  02/04-Awake alert on T collar-TC during the day and PC: 12/10//15//50% at night-interrmittent rapid AF noted  02/07-Awake had Bronch yesterday-  02/08-Seen by [facility] for LLE weakness appears peripheral neuropathy  02/10-Awake on T Collar trial is ambulsting-AF  02/12-Awake still c/o orthopnea         PMH -reviewed admission note, no change since admission    MEDICATIONS  (STANDING):  acetylcysteine 10%  Inhalation 4 milliLiter(s) Inhalation every 6 hours  albumin human  5% IVPB 250 milliLiter(s) IV Intermittent once  aMIOdarone    Tablet 200 milliGRAM(s) Oral daily  ascorbic acid 500 milliGRAM(s) Oral daily  aspirin  chewable 81 milliGRAM(s) Oral daily  atorvastatin 80 milliGRAM(s) Oral at bedtime  chlorhexidine 0.12% Liquid 15 milliLiter(s) Oral Mucosa every 12 hours  chlorhexidine 2% Cloths 1 Application(s) Topical daily  chlorhexidine 4% Liquid 1 Application(s) Topical <User Schedule>  dextrose 50% Injectable 50 milliLiter(s) IV Push every 15 minutes  epoetin ignacia (EPOGEN) Injectable 40861 Unit(s) IV Push <User Schedule>  ferrous    sulfate Liquid 300 milliGRAM(s) Enteral Tube daily  insulin lispro (ADMELOG) corrective regimen sliding scale   SubCutaneous every 6 hours  levalbuterol Inhalation 0.63 milliGRAM(s) Inhalation every 8 hours  melatonin 5 milliGRAM(s) Oral at bedtime  methocarbamol 500 milliGRAM(s) Oral every 8 hours  metoprolol tartrate 12.5 milliGRAM(s) Oral <User Schedule>  mirtazapine 7.5 milliGRAM(s) Oral daily  Nephro-elicia 1 Tablet(s) Oral daily  pantoprazole  Injectable 40 milliGRAM(s) IV Push daily  polyethylene glycol 3350 17 Gram(s) Oral two times a day  sodium chloride 0.65% Nasal 1 Spray(s) Both Nostrils every 12 hours  sodium chloride 0.9%. 1000 milliLiter(s) (10 mL/Hr) IV Continuous <Continuous>  traZODone 50 milliGRAM(s) Oral at bedtime    MEDICATIONS  (PRN):  acetaminophen     Tablet .. 650 milliGRAM(s) Oral every 6 hours PRN Mild Pain (1 - 3)  lidocaine/prilocaine Cream 1 Application(s) Topical daily PRN pre-HD  oxyCODONE    IR 5 milliGRAM(s) Oral every 4 hours PRN Moderate Pain (4 - 6)  oxyCODONE    IR 10 milliGRAM(s) Oral every 4 hours PRN Severe Pain (7 - 10)  sodium chloride 0.9% lock flush 10 milliLiter(s) IV Push every 1 hour PRN Pre/post blood products, medications, blood draw, and to maintain line patency            REVIEW OF SYSTEMS:  Constitutional: [ ] fever, [ ]weight loss,  [ x]fatigue [ ]weight gain  Eyes: [ ] visual changes  Respiratory: [ ]shortness of breath;  [ ] cough, [ ]wheezing, [ ]chills, [ ]hemoptysis  Cardiovascular: [ ] chest pain, [ ]palpitations, [ ]dizziness,  [ ]leg swelling[ ]orthopnea[ ]PND  Gastrointestinal: [ ] abdominal pain, [ ]nausea, [ ]vomiting,  [ ]diarrhea [ ]Constipation [ ]Melena  Genitourinary: [ ] dysuria, [ ] hematuria [ ]Vigil  Neurologic: [ ] headaches [ ] tremors[ ]weakness [ ]Paralysis Right[ ] Left[ ]  Skin: [ ] itching, [ ]burning, [ ] rashes  Endocrine: [ ] heat or cold intolerance  Musculoskeletal: [ ] joint pain or swelling; [ ] muscle, back, or extremity pain  Psychiatric: [ ] depression, [ ]anxiety, [ ]mood swings, or [ ]difficulty sleeping  Hematologic: [ ] easy bruising, [ ] bleeding gums    [ ] All remaining systems negative except as per above.   [ ]Unable to obtain.  [x] No change in ROS since admission      Vital Signs Last 24 Hrs  T(C): 36.8 (12 Feb 2025 04:00), Max: 37.3 (11 Feb 2025 11:00)  T(F): 98.2 (12 Feb 2025 04:00), Max: 99.1 (11 Feb 2025 11:00)  HR: 55 (12 Feb 2025 06:00) (55 - 110)  BP: 114/62 (12 Feb 2025 06:00) (94/52 - 177/74)  BP(mean): 83 (12 Feb 2025 06:00) (65 - 489)  RR: 11 (12 Feb 2025 06:00) (10 - 29)  SpO2: 100% (12 Feb 2025 06:00) (71% - 100%)    Parameters below as of 12 Feb 2025 05:45  Patient On (Oxygen Delivery Method): ventilator      I&O's Summary    10 Feb 2025 07:01  -  11 Feb 2025 07:00  --------------------------------------------------------  IN: 2085 mL / OUT: 3000 mL / NET: -915 mL    11 Feb 2025 07:01  -  12 Feb 2025 06:27  --------------------------------------------------------  IN: 1855 mL / OUT: 4550 mL / NET: -2695 mL        PHYSICAL EXAM:  General: No acute distress BMI-28  HEENT: EOMI, PERRL  Neck: Supple, [ ] JVD  Lungs: Equal air entry bilaterally; [ ] rales [ ] wheezing [ ] rhonchi  Heart: Regular rate and rhythm; [x ] murmur   2/6 [ x] systolic [ ] diastolic [ ] radiation[ ] rubs [ ]  gallops  Abdomen: Nontender, bowel sounds present  Extremities: No clubbing, cyanosis, [ ] edema [ ]Pulses  equal and intact  Nervous system:  Alert & Oriented X3, no focal deficits  Psychiatric: Normal affect  Skin: No rashes or lesions    LABS:  02-12    134[L]  |  95[L]  |  41[H]  ----------------------------<  166[H]  5.5[H]   |  27  |  5.48[H]    Ca    8.5      12 Feb 2025 00:35  Phos  4.1     02-12  Mg     2.6     02-12    TPro  6.4  /  Alb  3.0[L]  /  TBili  0.4  /  DBili  x   /  AST  14  /  ALT  10  /  AlkPhos  107  02-12    Creatinine Trend: 5.48<--, 4.69<--, 5.93<--, 5.13<--, 4.28<--, 4.37<--                        7.5    16.84 )-----------( 237      ( 12 Feb 2025 00:36 )             24.4     PT/INR - ( 12 Feb 2025 00:36 )   PT: 15.4 sec;   INR: 1.34 ratio         PTT - ( 12 Feb 2025 00:36 )  PTT:35.1 sec        Xray Chest 1 View- PORTABLE-Routine (Xray Chest 1 View- PORTABLE-Routine in AM.) (02.09.25 @ 08:00) >  2/8, 7:29 AM portable chest:  Apical lordotic projection. . When taking into account difference in  technique, there is no significant change.    2/9, 6:08 AM portable chest:  When taking into account difference in technique, there is no significant  change.         TTE Limited W or WO Ultrasound Enhancing Agent (02.09.25 @ 14:29) >  CONCLUSIONS:      1. Left ventricular systolic function is normal with an ejection fraction visually estimated at 55 to 60 %.   2. Enlarged right ventricular cavity size and reduced right ventricular systolic function.   3. Small pericardial effusion with no echocardiographic evidence of tamponade physiology: respiratory variation across the mitral valve E wave is not greater than 30%, respiratory variation across the tricuspid valve E wave is not greater than 60%, no diastolic collapse of right atrium, exceding1/3 of the cardiac cycle and no early diastolic inversion of the right ventricle.   4. Compared to the transthoracic echocardiogram performed on 2/3/2025, the pericardial effusion has decreased in size.

## 2025-02-12 NOTE — PROGRESS NOTE ADULT - PROBLEM SELECTOR PLAN 1
- Please obtain formal sniff test & VQ scan to reassess diaphragm function  - will revaluate for candidacy for left diaphragm plication after testing completed  - Plan discussed with Dr. Abdullahi  - Continue care as per ICU team

## 2025-02-12 NOTE — PROGRESS NOTE ADULT - SUBJECTIVE AND OBJECTIVE BOX
[facility]    Dr. Nica Styles     Office [phone] (9 am to 5 pm)  Service: [phone] (5pm to 9am)  Also Available on TEAMS      RENAL PROGRESS NOTE: DATE OF SERVICE 02-12-25 @ 11:25    Patient is a 54y old  Male who presents with a chief complaint of CAD s/p CABG (12 Feb 2025 06:27)      Patient seen and examined at bedside. No chest pain/sob    VITALS:  T(F): 98.1 (02-12-25 @ 08:00), Max: 98.6 (02-11-25 @ 12:50)  HR: 89 (02-12-25 @ 10:00)  BP: 139/71 (02-12-25 @ 10:00)  RR: 15 (02-12-25 @ 10:00)  SpO2: 95% (02-12-25 @ 10:00)  Wt(kg): --    02-11 @ 07:01  -  02-12 @ 07:00  --------------------------------------------------------  IN: 1915 mL / OUT: 4550 mL / NET: -2635 mL    02-12 @ 07:01  -  02-12 @ 11:25  --------------------------------------------------------  IN: 180 mL / OUT: 0 mL / NET: 180 mL          PHYSICAL EXAM:  Constitutional: NAD  Neck: No JVD  Respiratory: CTAB, no wheezes, rales or rhonchi  Cardiovascular: S1, S2, RRR  Gastrointestinal: BS+, soft, NT/ND  Extremities: No peripheral edema    Hospital Medications:   MEDICATIONS  (STANDING):  albumin human  5% IVPB 250 milliLiter(s) IV Intermittent once  aMIOdarone    Tablet 200 milliGRAM(s) Oral daily  ascorbic acid 500 milliGRAM(s) Oral daily  aspirin  chewable 81 milliGRAM(s) Oral daily  atorvastatin 80 milliGRAM(s) Oral at bedtime  chlorhexidine 0.12% Liquid 15 milliLiter(s) Oral Mucosa every 12 hours  chlorhexidine 2% Cloths 1 Application(s) Topical daily  chlorhexidine 4% Liquid 1 Application(s) Topical <User Schedule>  dextrose 50% Injectable 50 milliLiter(s) IV Push every 15 minutes  epoetin ignacia (EPOGEN) Injectable 27278 Unit(s) IV Push <User Schedule>  ferrous    sulfate Liquid 300 milliGRAM(s) Enteral Tube daily  heparin  Infusion 500 Unit(s)/Hr (5 mL/Hr) IV Continuous <Continuous>  insulin lispro (ADMELOG) corrective regimen sliding scale   SubCutaneous every 6 hours  melatonin 5 milliGRAM(s) Oral at bedtime  methocarbamol 500 milliGRAM(s) Oral every 8 hours  metoprolol tartrate 12.5 milliGRAM(s) Oral <User Schedule>  mirtazapine 7.5 milliGRAM(s) Oral daily  Nephro-elicia 1 Tablet(s) Oral daily  pantoprazole  Injectable 40 milliGRAM(s) IV Push daily  polyethylene glycol 3350 17 Gram(s) Oral two times a day  sodium chloride 0.65% Nasal 1 Spray(s) Both Nostrils every 12 hours  sodium chloride 0.9%. 1000 milliLiter(s) (10 mL/Hr) IV Continuous <Continuous>  traZODone 50 milliGRAM(s) Oral at bedtime      LABS:  02-12    134[L]  |  95[L]  |  41[H]  ----------------------------<  166[H]  5.5[H]   |  27  |  5.48[H]    Ca    8.5      12 Feb 2025 00:35  Phos  4.1     02-12  Mg     2.6     02-12    TPro  6.4  /  Alb  3.0[L]  /  TBili  0.4  /  DBili      /  AST  14  /  ALT  10  /  AlkPhos  107  02-12    Creatinine Trend: 5.48 <--, 4.69 <--, 5.93 <--, 5.13 <--, 4.28 <--, 4.37 <--, 4.45 <--    Albumin: 3.0 g/dL (02-12 @ 00:35)  Phosphorus: 4.1 mg/dL (02-12 @ 00:35)                              7.5    16.84 )-----------( 237      ( 12 Feb 2025 00:36 )             24.4     Urine Studies:  Urinalysis - [02-12-25 @ 00:35]      Color  / Appearance  / SG  / pH       Gluc 166 / Ketone   / Bili  / Urobili        Blood  / Protein  / Leuk Est  / Nitrite       RBC  / WBC  / Hyaline  / Gran  / Sq Epi  / Non Sq Epi  / Bacteria       Iron 29, TIBC 143, %sat 20      [12-19-24 @ 05:00]  Ferritin 904      [12-19-24 @ 05:00]  TSH 4.75      [12-24-24 @ 06:50]        RADIOLOGY & ADDITIONAL STUDIES:

## 2025-02-12 NOTE — PROGRESS NOTE ADULT - ASSESSMENT
54M with PMH of HTN, HLD, ESRD on HD MWF via LUE AVF, ascites (requiring repeat paracenteses q4-6wks), NICM with moderate LV dysfunction w/ EF 35-40%(2023) and CAD s/p atherectomy + SALLIE to D1(4/11/2024) presents to [facility] transferred from [facility] complaining of SOB s/p LHC showing pLAD 70% ISR, mLCx 70%, D1 severe dz.  Now s/p CABGx3 with Dr. Rivers 12/30. Course complicated by acute respiratory distress and PNA requiring multiple intubations and bronchs for left lower lobe collapse and PNA, s/p perc trach 1/16 by general surgery. Given no progress in left sided ventilation and clearance, evaluation for concern for diaphragmatic paralysis. Thoracic surgery consulted for evaluation for diaphragmatic plication. Bedside M mode ultrasound of left diaphragm attempted but patient non-compliant.

## 2025-02-12 NOTE — PROGRESS NOTE ADULT - ASSESSMENT
54M with PMH of HTN, HLD, ESRD on HD MWF via LUE AVF, ascites (requiring repeat paracenteses q4-6wks), NICM with moderate LV dysfunction w/ EF 35-40%(2023) and CAD s/p atherectomy + SALLIE to D1(4/11/2024) presents to [facility] transferred from [facility] for CABG eval  Nephro on [facility] for HD    ESRD on HD   Regular schedule MWF   Access LUE AVF  Center [facility]  Nephrologist Dr. Bailey  S/p HD on 01/29, 2/3 and 02/05  S/p HD on 02/07   s/p hd 2/10 uf 3L  S/p PUF on -2/11 and HD today 02/12  Keep MAP>65  Renal Diet  Consent in physical chart    Hyper/Hypokalemia  Monitor K    HTN  bp acceptable  monitor bp     CKD MBD  Can send a PTH  Ca and Phos daily    Anemia  CRISTIANE with HD  Transfuse if hgb <7    CAD with multivessel disease  s/p CABG 12/30  CTICU following    Hypoxic Resp failure requiring Trach  Per surgery  S/p bronchoscopy, pulm following

## 2025-02-12 NOTE — PROGRESS NOTE ADULT - SUBJECTIVE AND OBJECTIVE BOX
Brief history :   55 yo M with multiple medical problems including CAD s/p PCI in 2024 s/p CABG x 3 on 24    1: Intubated for hypoxia & left lung white out s/p awake bronch with removal of copious mucopurulent secretions  : s/p IABP insertion, sepsis/septic shock due to E coli   ESBL pneumonia, collapsed Left Lung, s/p multiple bronch    tracheostomy tube placement    VRE E. Faecium Bcx   +HSV PCR BAL   tissue cx from back abscess - Serratia/MRSA  - - intermittent bradycardia/junctional episodes with hypotension  2/3: off , off theophylline. iHD 1L UF  : bronch for Left lung collapse wound vac, 2decho w/ moderate pericardial effusion - similar as before, US abd: small - moderate ascites - monitor at this time.  Wound vac placed for sacral wounds  : iHD-1L UF  : bronch today off positive pressure   : concern for left food drop   2/10 : no acute issues - CXR shows improving left sided aeration, pt subjectively reports having difficulty while lying flat      ICU Vital Signs Last 24 Hrs  T(C): 36.8 (25 @ 04:00), Max: 37.3 (25 @ 11:00)  T(F): 98.2 (25 @ 04:00), Max: 99.1 (25 @ 11:00)  HR: 57 (25 @ 07:00) (55 - 110)  BP: 123/61 (25 @ 07:00) (94/52 - 177/74)  BP(mean): 88 (25 @ 07:00) (65 - 489)  ABP: --  ABP(mean): --  RR: 11 (25 @ 07:00) (10 - 29)  SpO2: 100% (25 @ 07:00) (71% - 100%)    I&O's Summary    2025 07:01  -  2025 07:00  --------------------------------------------------------  IN: 1915 mL / OUT: 4550 mL / NET: -2635 mL      Mode: CPAP with PS  FiO2: 40  PEEP: 5  PS: 8  MAP: 7  PIP: 14                                7.5    16.84 )-----------( 237      ( 2025 00:36 )             24.4     2025 00:35    134    |  95     |  41     ----------------------------<  166    5.5     |  27     |  5.48     Ca    8.5        2025 00:35  Phos  4.1       2025 00:35  Mg     2.6       2025 00:35    TPro  6.4    /  Alb  3.0    /  TBili  0.4    /  DBili  x      /  AST  14     /  ALT  10     /  AlkPhos  107    2025 00:35    PT/INR - ( 2025 00:36 )   PT: 15.4 sec;   INR: 1.34 ratio         PTT - ( 2025 00:36 )  PTT:35.1 sec    Culture - Body Fluid with Gram Stain (collected 2025 18:21)  Source: Ascites Fl  Gram Stain (2025 23:44):    polymorphonuclear leukocytes seen    No organisms seen    by cytocentrifuge    Culture - Sputum (collected 2025 10:28)  Source: .Sputum Sputum  Gram Stain (2025 19:15):    Rare polymorphonuclear leukocytes per low power field    No Squamous epithelial cells per low power field    Rare Gram Negative Rods seen per oil power field        MEDICATIONS  (STANDING):  acetylcysteine 10%  Inhalation 4 milliLiter(s) Inhalation every 6 hours  albumin human  5% IVPB 250 milliLiter(s) IV Intermittent once  aMIOdarone    Tablet 200 milliGRAM(s) Oral daily  ascorbic acid 500 milliGRAM(s) Oral daily  aspirin  chewable 81 milliGRAM(s) Oral daily  atorvastatin 80 milliGRAM(s) Oral at bedtime  chlorhexidine 0.12% Liquid 15 milliLiter(s) Oral Mucosa every 12 hours  chlorhexidine 2% Cloths 1 Application(s) Topical daily  chlorhexidine 4% Liquid 1 Application(s) Topical <User Schedule>  dextrose 50% Injectable 50 milliLiter(s) IV Push every 15 minutes  epoetin ignacia (EPOGEN) Injectable 40717 Unit(s) IV Push <User Schedule>  ferrous    sulfate Liquid 300 milliGRAM(s) Enteral Tube daily  insulin lispro (ADMELOG) corrective regimen sliding scale   SubCutaneous every 6 hours  levalbuterol Inhalation 0.63 milliGRAM(s) Inhalation every 8 hours  melatonin 5 milliGRAM(s) Oral at bedtime  methocarbamol 500 milliGRAM(s) Oral every 8 hours  metoprolol tartrate 12.5 milliGRAM(s) Oral <User Schedule>  mirtazapine 7.5 milliGRAM(s) Oral daily  Nephro-elicia 1 Tablet(s) Oral daily  pantoprazole  Injectable 40 milliGRAM(s) IV Push daily  polyethylene glycol 3350 17 Gram(s) Oral two times a day  sodium chloride 0.65% Nasal 1 Spray(s) Both Nostrils every 12 hours  sodium chloride 0.9%. 1000 milliLiter(s) IV Continuous <Continuous>  traZODone 50 milliGRAM(s) Oral at bedtime    Home Medications:  aspirin 81 mg oral delayed release tablet: 1 tab(s) orally once a day (23 Dec 2024 16:58)  calcium acetate 667 mg oral tablet: 1 tab(s) orally 3 times a day (23 Dec 2024 16:58)  carvedilol 12.5 mg oral tablet: 1 tab(s) orally every 12 hours (23 Dec 2024 16:56)  clopidogrel 75 mg oral tablet: 1 tab(s) orally once a day (23 Dec 2024 16:56)  furosemide 20 mg oral tablet: 1 tab(s) orally once a day (23 Dec 2024 16:58)  hydrALAZINE 25 mg oral tablet: 1 tab(s) orally 3 times a day (23 Dec 2024 16:58)  lisinopril 40 mg oral tablet: 1 tab(s) orally once a day (23 Dec 2024 16:58)  lovastatin 10 mg oral tablet: 1 tab(s) orally once a day (23 Dec 2024 16:56)  NIFEdipine 90 mg oral tablet, extended release: 1 tab(s) orally once a day (23 Dec 2024 16:58)  Emani-Elicia oral tablet: 1 tab(s) orally once a day (23 Dec 2024 16:58)  traZODone 100 mg oral tablet: 1 tab(s) orally once a day (at bedtime) (23 Dec 2024 16:56)    PHYSICAL EXAM:  Gen : no acute distress  Neck: + trach   Respiratory: decreased in the bases  Cardiovascular: S1 and S2, RRR, no M/G/R  Gastrointestinal: distended + ascites   Extremities: No peripheral edema  Vascular: 2+ peripheral pulses  Neurological: A/O x 3, Left foot drop when ambulating       S/p CABG  Respiratory failure due to recurrent left lung plugging now s/p trach  vent weaning   ESRD on HD  Post op AFib   History of RV dysfunction   h/o Recurrent ascites   Acute blood loss anemia   Post operative pain   Sacral decub     55 yo with history of  ESRD on HD, chronic RV dysfunction with recurrent ascites, s/p CABG on .  Post op course complicated by sepsis/pneumonia  Now Current active issue is respiratory failure due to left sided mucus plugging and poor cough and inability to manage secretion.     Neuro - pain management   Foot drop - likely peripheral nerve injury   continue PT    CVS - recovering from CABG - ECHO  LV EF nl, RV remains dilated and hypokinetic (chronic)  AFIB - PO Amio for afib prophylaxis, BB for rate control - anticoagulation started    Pulm - vent weaning - pt now asking to stay on vent due to feeling SOB when lying flat - has known ascites, discussed draining ascites to see if symptoms improve  Concern diaphragmatic dysfunction - will need formal Sniff study for diagnosis   Tolerating TC when sitting upright   Wean vent   Decrease PS   consider trach down sizing in the next few day s    GI - TF  Discussed with team drainage of ascites yesteday  GI proph/Bowel regimen     - tolerated HD     Heme - acute on chronic anemia (post op)  ASA / Statin  FE/EPO  Anticoagulation for PAF     ID - completed antibiotics for skin and soft tissue infection  Leukocytosis - send sputum cultures   repeat CBC in PM     Skin - Sacral decub - wound care, Turning Q2   optimize nutrition          Critical care time spent    min       Care plan discussed with the ICU care team.   Patient remains critical, at risk for life threatening decompensation.    I have spent 30 minutes providing critical care management to this patient.    By signing my name below, ISonali, attest that this documentation has been prepared under the direction and in the presence of Herminio Mendez MD.   Electronically signed: Sonali Valle, 25 @ 07:51    I, Herminio Mendez, personally performed the services described in this documentation. all medical record entries made by the scribe were at my direction and in my presence. I have reviewed the chart and agree that the record reflects my personal performance and is accurate and complete  Electronically signed: Herminio Mendez MD.  Brief history :   53 yo M with multiple medical problems including CAD s/p PCI in 2024 s/p CABG x 3 on 24    1: Intubated for hypoxia & left lung white out s/p awake bronch with removal of copious mucopurulent secretions  : s/p IABP insertion, sepsis/septic shock due to E coli   ESBL pneumonia, collapsed Left Lung, s/p multiple bronch    tracheostomy tube placement    VRE E. Faecium Bcx   +HSV PCR BAL   tissue cx from back abscess - Serratia/MRSA  - - intermittent bradycardia/junctional episodes with hypotension  2/3: off , off theophylline. iHD 1L UF  : bronch for Left lung collapse wound vac, 2decho w/ moderate pericardial effusion - similar as before, US abd: small - moderate ascites - monitor at this time.  Wound vac placed for sacral wounds  : iHD-1L UF  : bronch today off positive pressure   : concern for left food drop   2/10 : no acute issues - CXR shows improving left sided aeration, pt subjectively reports having difficulty while lying flat  : s/p Paracentesis 3.5 L removed, leukocytosis   : Ascites fluid PMN < 250 (less likely SBP)       ICU Vital Signs Last 24 Hrs  T(C): 36.8 (25 @ 04:00), Max: 37.3 (25 @ 11:00)  T(F): 98.2 (25 @ 04:00), Max: 99.1 (25 @ 11:00)  HR: 57 (25 @ 07:00) (55 - 110)  BP: 123/61 (25 @ 07:00) (94/52 - 177/74)  BP(mean): 88 (25 @ 07:00) (65 - 489)  RR: 11 (25 @ 07:00) (10 - 29)  SpO2: 100% (25 @ 07:00) (71% - 100%)    I&O's Summary    2025 07:01  -  2025 07:00  --------------------------------------------------------  IN: 1915 mL / OUT: 4550 mL / NET: -2635 mL      Mode: CPAP with PS  FiO2: 40  PEEP: 5  PS: 8  MAP: 7  PIP: 14                        7.5    16.84 )-----------( 237      ( 2025 00:36 )             24.4     2025 00:35    134    |  95     |  41     ----------------------------<  166    5.5     |  27     |  5.48     Ca    8.5        2025 00:35  Phos  4.1       2025 00:35  Mg     2.6       2025 00:35    TPro  6.4    /  Alb  3.0    /  TBili  0.4    /  DBili  x      /  AST  14     /  ALT  10     /  AlkPhos  107    2025 00:35    PT/INR - ( 2025 00:36 )   PT: 15.4 sec;   INR: 1.34 ratio    PTT - ( 2025 00:36 )  PTT:35.1 sec    Culture - Body Fluid with Gram Stain (collected 2025 18:21)  Source: Ascites Fl  Gram Stain (2025 23:44):    polymorphonuclear leukocytes seen    No organisms seen    by cytocentrifuge    Culture - Sputum (collected 2025 10:28)  Source: .Sputum Sputum  Gram Stain (2025 19:15):    Rare polymorphonuclear leukocytes per low power field    No Squamous epithelial cells per low power field    Rare Gram Negative Rods seen per oil power field    MEDICATIONS  (STANDING):  aMIOdarone    Tablet 200 milliGRAM(s) Oral daily  ascorbic acid 500 milliGRAM(s) Oral daily  aspirin  chewable 81 milliGRAM(s) Oral daily  atorvastatin 80 milliGRAM(s) Oral at bedtime  metoprolol tartrate 12.5 milliGRAM(s) Oral <User Schedule>    epoetin ignacia (EPOGEN) Injectable 34110 Unit(s) IV Push <User Schedule>    ferrous    sulfate Liquid 300 milliGRAM(s) Enteral Tube daily    insulin lispro (ADMELOG) corrective regimen sliding scale   SubCutaneous every 6 hours  levalbuterol Inhalation 0.63 milliGRAM(s) Inhalation every 8 hours  melatonin 5 milliGRAM(s) Oral at bedtime    Nephro-elicia 1 Tablet(s) Oral daily  pantoprazole  Injectable 40 milliGRAM(s) IV Push daily  polyethylene glycol 3350 17 Gram(s) Oral two times a day    methocarbamol 500 milliGRAM(s) Oral every 8 hours  traZODone 50 milliGRAM(s) Oral at bedtime  mirtazapine 7.5 milliGRAM(s) Oral daily    PHYSICAL EXAM:  Gen : no acute distress  Neck: + trach   Respiratory: decreased in the bases  Cardiovascular: S1 and S2, RRR, no M/G/R  Gastrointestinal: distended, soft   Extremities: No peripheral edema  Vascular: 2+ peripheral pulses  Neurological: A/O x 3, Left foot drop when ambulating       S/p CABG  Respiratory failure due to recurrent left lung plugging now s/p trach  vent weaning   ESRD on HD  Post op AFib   History of RV dysfunction   h/o Recurrent ascites   Acute blood loss anemia   Post operative pain   Sacral decub   recurrent ascites likely from Chronic RV failure - s/p paracentesis     53 yo with history of  ESRD on HD, chronic RV dysfunction with recurrent ascites, s/p CABG on .  Post op course complicated by sepsis/pneumonia  Now Current active issue is respiratory failure due to left sided mucus plugging and poor cough and inability to manage secretion.     Neuro - pain management   Foot drop - likely peripheral nerve injury   continue PT    CVS - recovering from CABG - ECHO  LV EF nl, RV remains dilated and hypokinetic (chronic)  AFIB - PO Amio for afib prophylaxis, BB for rate control - anticoagulation started  Chronic RV Dysfunction     Pulm - vent weaning - pt now asking to stay on vent due to feeling SOB when lying flat - has known ascites, discussed draining ascites to see if symptoms improve  Concern diaphragmatic dysfunction - will need formal Sniff study for diagnosis   Tolerating TC when sitting upright   Wean vent   Decrease PS   consider trach down sizing in the next few days    GI - TF  Discussed with team drainage of ascites yesterday  GI proph/Bowel regimen     - tolerated HD     Heme - acute on chronic anemia (post op)  ASA / Statin  FE/EPO  Anticoagulation for PAF     ID - completed antibiotics for skin and soft tissue infection  Leukocytosis - send sputum cultures, blood cultures today   Empiric Antibiotic (Andrew)     Skin - Sacral decub - wound care, Turning Q2   optimize nutrition      Critical care time spent 45  min     Care plan discussed with the ICU care team.   Patient remains critical, at risk for life threatening decompensation.    By signing my name below, I, Sonali Valle, attest that this documentation has been prepared under the direction and in the presence of Herminio Mendez MD.   Electronically signed: Sonali Valle, 25 @ 07:51    I, Herminio Mendez, personally performed the services described in this documentation. all medical record entries made by the scribe were at my direction and in my presence. I have reviewed the chart and agree that the record reflects my personal performance and is accurate and complete  Electronically signed: Herminio Mendez MD.

## 2025-02-12 NOTE — PROGRESS NOTE ADULT - ASSESSMENT
54M with HTN, HLD, ESRD on HD MWF via LUE AVF, ascites (requiring repeat paracenteses q4-6wks), NICM with moderate LV dysfunction w/ EF 35-40%() and CAD s/p atherectomy + SALLIE to D1 (2024) presents to [facility]) where he presented  with SOB.   In [facility] ED, pt was hypoxic to 86% on RA, trop 1.7K, EKG no new changes, CXR fluid overload. Admitted for AHRF 2/2 fluid overload. Pt received HD on , ,  and . DAPT was resumed, TTE showed improvement in LVEF to 40-45%. Stress test was abnormal with 1mm inferior STD, reversible ischemia in the inferior wall and fixed defects in the lateral, anterior and apical walls with post stress EF of 24%. Pt was then transferred to [facility] for cardiac cath which showed pLAD 70% ISR, mLCx 70%, D1 severe dz. Patient now transferred to [facility] 2024 for CABG.       - underwent  C3L free LIMA-LAD; SVG-Diag; SVG-leftPL   - extubated   - hypotension and ECHO showing Systolic HF/depressed BIV Function, currently supported with /pressors.  Labs this evening showing transaminitis   - hypotensive, febrile and reintubated  1/3 - cultures (+) E coli +ESBL bacteremia   - underwent tracheostomy   - left lung collapse s/p bronchoscopy   - seen by plastic surgery / colorectal for sacral wound, no surgical intervention, no evidence of fistula, BC sent  - c/o right arm  pain, started on acyclovir for positive HSV PCR     doing a little better tissue culture with serratia; bronch also growing some yeast and VREC   back with nodule with some discharge    sacral decubitus less tender, some bloody secretions from trach  - still with drainage from back lesion  s/p I & D of lesion and sacrum   noted left foot numness , foot drop   2/10 - completed abs for back- dried up  local care for decubitus. thoracic surgery to assess left diaphragm    jump in WBC    Recommendations  - completed linezolid course  -completed meropenem  completed abs for back   - surgical f/u appreciated   - check cultures from bronch  neuro input appreciated  - local care for sacral decubitus, more comfortable     check BC x 2 sets for jump in WBC, if repeat still elevated or any change in status  paracentesis not c/w infection   for further suctioning and evaluation of lung  for empiric meropenem pending cultures  change lines   check sputum culture   ? repeat echo      Marla Champion M.D. ,   please reach via teams   If no answer, or after 5PM/ weekends,  then please call  [phone]    Assessment and plan discussed with the primary team .

## 2025-02-12 NOTE — PROGRESS NOTE ADULT - SUBJECTIVE AND OBJECTIVE BOX
Patient is a 54y old  Male who presents with a chief complaint of CAD s/p CABG (12 Feb 2025 06:27)    Being followed by ID for        Interval history:  No other acute events      ROS:  No cough,SOB,CP  No N/V/D  No abd pain  No urinary complaints  No HA  No joint or limb pain  No other complaints    PAST MEDICAL & SURGICAL HISTORY:  Type 2 diabetes mellitus      Hypertension      End stage renal disease  started HD 2/2019 T, Th, Sat via right chest permacath      Bone spur  right shoulder- hx of - sx done      Anemia      Injury of right wrist  hx of at age 14      History of hyperkalemia  before HD- K-6.2 - to repeat in am of sx, pt had HD today      S/P arthroscopy of right shoulder  2010      History of vascular access device  right chest permacath - 2/2019      H/O right wrist surgery  tendons repair - at age 14      AV fistula      H/O ventral hernia repair      S/P cholecystectomy        Allergies    No Known Allergies    Intolerances      Antimicrobials:      MEDICATIONS  (STANDING):  albumin human  5% IVPB 250 milliLiter(s) IV Intermittent once  aMIOdarone    Tablet 200 milliGRAM(s) Oral daily  ascorbic acid 500 milliGRAM(s) Oral daily  aspirin  chewable 81 milliGRAM(s) Oral daily  atorvastatin 80 milliGRAM(s) Oral at bedtime  chlorhexidine 0.12% Liquid 15 milliLiter(s) Oral Mucosa every 12 hours  chlorhexidine 2% Cloths 1 Application(s) Topical daily  chlorhexidine 4% Liquid 1 Application(s) Topical <User Schedule>  dextrose 50% Injectable 50 milliLiter(s) IV Push every 15 minutes  epoetin ignacia (EPOGEN) Injectable 56033 Unit(s) IV Push <User Schedule>  ferrous    sulfate Liquid 300 milliGRAM(s) Enteral Tube daily  heparin  Infusion 500 Unit(s)/Hr (5 mL/Hr) IV Continuous <Continuous>  insulin lispro (ADMELOG) corrective regimen sliding scale   SubCutaneous every 6 hours  melatonin 5 milliGRAM(s) Oral at bedtime  methocarbamol 500 milliGRAM(s) Oral every 8 hours  metoprolol tartrate 12.5 milliGRAM(s) Oral <User Schedule>  mirtazapine 7.5 milliGRAM(s) Oral daily  Nephro-elicia 1 Tablet(s) Oral daily  pantoprazole  Injectable 40 milliGRAM(s) IV Push daily  polyethylene glycol 3350 17 Gram(s) Oral two times a day  sodium chloride 0.65% Nasal 1 Spray(s) Both Nostrils every 12 hours  sodium chloride 0.9%. 1000 milliLiter(s) (10 mL/Hr) IV Continuous <Continuous>  traZODone 50 milliGRAM(s) Oral at bedtime      Vital Signs Last 24 Hrs  T(C): 36.7 (02-12-25 @ 08:00), Max: 37 (02-11-25 @ 12:50)  T(F): 98.1 (02-12-25 @ 08:00), Max: 98.6 (02-11-25 @ 12:50)  HR: 89 (02-12-25 @ 10:00) (55 - 110)  BP: 139/71 (02-12-25 @ 10:00) (94/52 - 177/74)  BP(mean): 99 (02-12-25 @ 10:00) (65 - 489)  RR: 15 (02-12-25 @ 10:00) (10 - 29)  SpO2: 95% (02-12-25 @ 10:00) (71% - 100%)    Physical Exam:    Constitutional well preserved,comfortable,pleasant    HEENT PERRLA EOMI,No pallor or icterus    No oral exudate or erythema    Neck supple no JVD or LN    Chest Good AE,CTA    CVS RRR S1 S2 WNl No murmur or rub or gallop    Abd soft BS normal No tenderness no masses    Ext No cyanosis clubbing or edema    IV site no erythema tenderness or discharge    Joints no swelling or LOM    CNS AAO X 3 no focal    Lab Data:                          7.5    16.84 )-----------( 237      ( 12 Feb 2025 00:36 )             24.4       02-12    134[L]  |  95[L]  |  41[H]  ----------------------------<  166[H]  5.5[H]   |  27  |  5.48[H]    Ca    8.5      12 Feb 2025 00:35  Phos  4.1     02-12  Mg     2.6     02-12    TPro  6.4  /  Alb  3.0[L]  /  TBili  0.4  /  DBili  x   /  AST  14  /  ALT  10  /  AlkPhos  107  02-12      Ascites Fl  02-11-25 --  --    polymorphonuclear leukocytes seen  No organisms seen  by cytocentrifuge      .Sputum Sputum  02-11-25 --  --    Rare polymorphonuclear leukocytes per low power field  No Squamous epithelial cells per low power field  Rare Gram Negative Rods seen per oil power field      < from: TTE Limited W or WO Ultrasound Enhancing Agent (02.09.25 @ 14:29) >   1. Left ventricular systolic function is normal with an ejection fraction visually estimated at 55 to 60 %.   2. Enlarged right ventricular cavity size and reduced right ventricular systolic function.   3. Small pericardial effusion with no echocardiographic evidence of tamponade physiology: respiratory variation across the mitral valve E wave is not greater than 30%, respiratory variation across the tricuspid valve E wave is not greater than 60%, no diastolic collapse of right atrium, exceding1/3 of the cardiac cycle and no early diastolic inversion of the right ventricle.   4. Compared to the transthoracic echocardiogram performed on 2/3/2025, the pericardial effusion has decreased in size.    < end of copied text >      < from: US Abdomen Limited (02.08.25 @ 14:37) >  IMPRESSION:  1.  Diffuse moderate volume abdominal ascites, increased from 2/3/2025  2.  Incidental pleural effusions    --- End of Report ---    < end of copied text >    Culture - Body Fluid with Gram Stain (02.11.25 @ 18:21)    Gram Stain:   polymorphonuclear leukocytes seen  No organisms seen  by cytocentrifuge   Specimen Source: Ascites Fl    Cell Count, Body Fluid (02.11.25 @ 18:21)    Tube Type: Screw Top   Fluid Type: Paracentesis Fluid   Body Fluid Appearance: Clear   Color - Body Fluid: Yellow   Total Nucleated Cell Count, Body Fluid: 176: Reference Ranges:  Synovial Fluid  Total nucleated cells < 150 cells/uL  Neutrophils <25%  Lymphocytes 10-15%  Monocytes/Macrophages </=70%  Other parameters- No established reference ranges.  Bronchoalveolar lavage  Macrophages >85%  Lymphocytes 10-15%  Neutrophils <3%  Eosinophils <1%  Other parameters- No established reference ranges.  Peritoneal Fluid  WBC Count </=499 cells/uL  RBC Count </=999 cells/uL  Neutrophil % </=24%  Neutrophil # >/= 250 cells/uL critical  Other parameters- No established reference ranges.  Pleural/pericardial fluid/other body fluid types  No established reference ranges. cells/uL   WBC Count.: 164: Differentials performed on hematopoietic cells only. cells/uL   Total RBC Count: <2000 cells/uL   Neutrophils-Body Fluid: 12 %   BF Lymphocytes: 28 %   Monocyte/Macrophage Count - Body Fluid: 60 %   Total Cells Counted, Body Fluid: 176 Cells      Nocardia RT-PCR-BAL (01.24.25 @ 14:12)    Nocardia RT-PCR-BAL: SeeComment **ENDOGENOUS INHIBITION OF PCR WAS DETECTED IN THIS SAMPLE;  THEREFORE THE RESULTS FOR THIS TEST WERE NOT CONCLUSIVE. OTHER PCR  TESTS ORDERED ON THIS SPECIMEN MAY BE PARTIALLY INHIBITED. INTERPRET  RESULTS WITH CAUTION. RECOMMEND SUBMISSION OF A SECOND SPECIMEN.  The Nocardia species assay design includes pathogen sequence  information from 11 Nocardia species described in the literature as  causing human illness (Meggan KUMAR et al., 2012). The target  species belong to the Nocardia genus (N. cyriacigeorgica, N.  asteroides complex, N. nova complex, N. transvalensis, N. wallacei,  N. brasiliensis, N. pseudobrasiliensis, N. africana, N. abscessus,  N. otitidiscaviarum, and N. farcinica). A subset of Nocardia species  may not bedetected by this assay.  The Nocardia assays were tested for cross reactivity against 30  bacterial targets including common causes of respiratory pneumonia.  Cross-reactivity has been observed with Rhodococcus equi and  Crossiella cryophila. Micromonospora saelicesensis, Streptomyces  gardneri, Streptomyces platensis and Saccharothrix genus members  showed cross reactivity in silico (none of these have been reported  to cause human infections but observed only in reports of  environmental sampling).  This test was developed and its performance characteristics  determined by [facility]. It has not been cleared or approved  by the U.S. Food and Drug Administration. Results should be used in  conjunction with clinical findings, and shouldnot form the sole  basis for a diagnosis or treatment decision.  ____________________________________________________________  Performed at:  [facility]  [address] [place] [address], [address]  [place] [address]  : Ciro Pryor, PhD ALBERTO (ABB)  CLIA # [id number]  FLAG Interpretation: A = Abnormal, H = High, L = Low      Lactate Dehydrogenase, Fluid (02.11.25 @ 18:21)    Lactate Dehydrogenase, Fluid: 101: Reference Ranges have NOT been established for analytes in body fluids  because of variability in body fluid composition.  The  has not determined the efficacy of this test when  performed on fluid specimens. The performance characteristics of this  test were determined by [facility] U/L        WBC Count: 16.84 (02-12-25 @ 00:36)  WBC Count: 16.96 (02-11-25 @ 19:34)  WBC Count: 15.69 (02-11-25 @ 00:32)  WBC Count: 9.11 (02-10-25 @ 00:29)  WBC Count: 8.71 (02-09-25 @ 00:20)  WBC Count: 9.68 (02-08-25 @ 00:39)  WBC Count: 9.48 (02-07-25 @ 00:35)  WBC Count: 9.28 (02-06-25 @ 00:20)       Bilirubin Total: 0.4 mg/dL (02-12-25 @ 00:35)  Aspartate Aminotransferase (AST/SGOT): 14 U/L (02-12-25 @ 00:35)  Alanine Aminotransferase (ALT/SGPT): 10 U/L (02-12-25 @ 00:35)  Alkaline Phosphatase: 107 U/L (02-12-25 @ 00:35)    Bilirubin Total: 0.4 mg/dL (02-11-25 @ 00:32)  Aspartate Aminotransferase (AST/SGOT): 17 U/L (02-11-25 @ 00:32)  Alanine Aminotransferase (ALT/SGPT): 9 U/L (02-11-25 @ 00:32)  Alkaline Phosphatase: 98 U/L (02-11-25 @ 00:32)    Bilirubin Total: 0.4 mg/dL (02-10-25 @ 00:29)  Aspartate Aminotransferase (AST/SGOT): 18 U/L (02-10-25 @ 00:29)  Alanine Aminotransferase (ALT/SGPT): 11 U/L (02-10-25 @ 00:29)  Alkaline Phosphatase: 97 U/L (02-10-25 @ 00:29)             Patient is a 54y old  Male who presents with a chief complaint of CAD s/p CABG (12 Feb 2025 06:27)    Being followed by ID for        Interval history:  pt with elevated wbc today  buttocks if feeling better  CXR reviewed with team - large heart silhouette and left sided collapse/atelectasis   No other acute events        PAST MEDICAL & SURGICAL HISTORY:  Type 2 diabetes mellitus      Hypertension      End stage renal disease  started HD 2/2019 T, Th, Sat via right chest permacath      Bone spur  right shoulder- hx of - sx done      Anemia      Injury of right wrist  hx of at age 14      History of hyperkalemia  before HD- K-6.2 - to repeat in am of sx, pt had HD today      S/P arthroscopy of right shoulder  2010      History of vascular access device  right chest permacath - 2/2019      H/O right wrist surgery  tendons repair - at age 14      AV fistula      H/O ventral hernia repair      S/P cholecystectomy        Allergies    No Known Allergies    Intolerances      Antimicrobials:      MEDICATIONS  (STANDING):  albumin human  5% IVPB 250 milliLiter(s) IV Intermittent once  aMIOdarone    Tablet 200 milliGRAM(s) Oral daily  ascorbic acid 500 milliGRAM(s) Oral daily  aspirin  chewable 81 milliGRAM(s) Oral daily  atorvastatin 80 milliGRAM(s) Oral at bedtime  chlorhexidine 0.12% Liquid 15 milliLiter(s) Oral Mucosa every 12 hours  chlorhexidine 2% Cloths 1 Application(s) Topical daily  chlorhexidine 4% Liquid 1 Application(s) Topical <User Schedule>  dextrose 50% Injectable 50 milliLiter(s) IV Push every 15 minutes  epoetin ignacia (EPOGEN) Injectable 58594 Unit(s) IV Push <User Schedule>  ferrous    sulfate Liquid 300 milliGRAM(s) Enteral Tube daily  heparin  Infusion 500 Unit(s)/Hr (5 mL/Hr) IV Continuous <Continuous>  insulin lispro (ADMELOG) corrective regimen sliding scale   SubCutaneous every 6 hours  melatonin 5 milliGRAM(s) Oral at bedtime  methocarbamol 500 milliGRAM(s) Oral every 8 hours  metoprolol tartrate 12.5 milliGRAM(s) Oral <User Schedule>  mirtazapine 7.5 milliGRAM(s) Oral daily  Nephro-elicia 1 Tablet(s) Oral daily  pantoprazole  Injectable 40 milliGRAM(s) IV Push daily  polyethylene glycol 3350 17 Gram(s) Oral two times a day  sodium chloride 0.65% Nasal 1 Spray(s) Both Nostrils every 12 hours  sodium chloride 0.9%. 1000 milliLiter(s) (10 mL/Hr) IV Continuous <Continuous>  traZODone 50 milliGRAM(s) Oral at bedtime      Vital Signs Last 24 Hrs  T(C): 36.7 (02-12-25 @ 08:00), Max: 37 (02-11-25 @ 12:50)  T(F): 98.1 (02-12-25 @ 08:00), Max: 98.6 (02-11-25 @ 12:50)  HR: 89 (02-12-25 @ 10:00) (55 - 110)  BP: 139/71 (02-12-25 @ 10:00) (94/52 - 177/74)  BP(mean): 99 (02-12-25 @ 10:00) (65 - 489)  RR: 15 (02-12-25 @ 10:00) (10 - 29)  SpO2: 95% (02-12-25 @ 10:00) (71% - 100%)    Physical Exam:    Constitutional well preserved,comfortable,pleasant    HEENT PERRLA EOMI,No pallor or icterus    NG tube    Neck supple no JVD or LN    Chest Good AE,CTA    CVS  S1 S2     Abd softly distended     Ext No cyanosis clubbing or edema    IV site no erythema tenderness or discharge    Joints no swelling or LOM    CNS AAO X 3 no focal    Lab Data:                          7.5    16.84 )-----------( 237      ( 12 Feb 2025 00:36 )             24.4       02-12    134[L]  |  95[L]  |  41[H]  ----------------------------<  166[H]  5.5[H]   |  27  |  5.48[H]    Ca    8.5      12 Feb 2025 00:35  Phos  4.1     02-12  Mg     2.6     02-12    TPro  6.4  /  Alb  3.0[L]  /  TBili  0.4  /  DBili  x   /  AST  14  /  ALT  10  /  AlkPhos  107  02-12      Ascites Fl  02-11-25 --  --    polymorphonuclear leukocytes seen  No organisms seen  by cytocentrifuge      .Sputum Sputum  02-11-25 --  --    Rare polymorphonuclear leukocytes per low power field  No Squamous epithelial cells per low power field  Rare Gram Negative Rods seen per oil power field      < from: TTE Limited W or WO Ultrasound Enhancing Agent (02.09.25 @ 14:29) >   1. Left ventricular systolic function is normal with an ejection fraction visually estimated at 55 to 60 %.   2. Enlarged right ventricular cavity size and reduced right ventricular systolic function.   3. Small pericardial effusion with no echocardiographic evidence of tamponade physiology: respiratory variation across the mitral valve E wave is not greater than 30%, respiratory variation across the tricuspid valve E wave is not greater than 60%, no diastolic collapse of right atrium, exceding1/3 of the cardiac cycle and no early diastolic inversion of the right ventricle.   4. Compared to the transthoracic echocardiogram performed on 2/3/2025, the pericardial effusion has decreased in size.    < end of copied text >      < from: US Abdomen Limited (02.08.25 @ 14:37) >  IMPRESSION:  1.  Diffuse moderate volume abdominal ascites, increased from 2/3/2025  2.  Incidental pleural effusions    --- End of Report ---    < end of copied text >    Culture - Body Fluid with Gram Stain (02.11.25 @ 18:21)    Gram Stain:   polymorphonuclear leukocytes seen  No organisms seen  by cytocentrifuge   Specimen Source: Ascites Fl    Cell Count, Body Fluid (02.11.25 @ 18:21)    Tube Type: Screw Top   Fluid Type: Paracentesis Fluid   Body Fluid Appearance: Clear   Color - Body Fluid: Yellow   Total Nucleated Cell Count, Body Fluid: 176: Reference Ranges:  Synovial Fluid  Total nucleated cells < 150 cells/uL  Neutrophils <25%  Lymphocytes 10-15%  Monocytes/Macrophages </=70%  Other parameters- No established reference ranges.  Bronchoalveolar lavage  Macrophages >85%  Lymphocytes 10-15%  Neutrophils <3%  Eosinophils <1%  Other parameters- No established reference ranges.  Peritoneal Fluid  WBC Count </=499 cells/uL  RBC Count </=999 cells/uL  Neutrophil % </=24%  Neutrophil # >/= 250 cells/uL critical  Other parameters- No established reference ranges.  Pleural/pericardial fluid/other body fluid types  No established reference ranges. cells/uL   WBC Count.: 164: Differentials performed on hematopoietic cells only. cells/uL   Total RBC Count: <2000 cells/uL   Neutrophils-Body Fluid: 12 %   BF Lymphocytes: 28 %   Monocyte/Macrophage Count - Body Fluid: 60 %   Total Cells Counted, Body Fluid: 176 Cells      Nocardia RT-PCR-BAL (01.24.25 @ 14:12)    Nocardia RT-PCR-BAL: SeeComment **ENDOGENOUS INHIBITION OF PCR WAS DETECTED IN THIS SAMPLE;  THEREFORE THE RESULTS FOR THIS TEST WERE NOT CONCLUSIVE. OTHER PCR  TESTS ORDERED ON THIS SPECIMEN MAY BE PARTIALLY INHIBITED. INTERPRET  RESULTS WITH CAUTION. RECOMMEND SUBMISSION OF A SECOND SPECIMEN.  The Nocardia species assay design includes pathogen sequence  information from 11 Nocardia species described in the literature as  causing human illness (Meggan KUMAR et al., 2012). The target  species belong to the Nocardia genus (N. cyriacigeorgica, N.  asteroides complex, N. nova complex, N. transvalensis, N. wallacei,  N. brasiliensis, N. pseudobrasiliensis, N. africana, N. abscessus,  N. otitidiscaviarum, and N. farcinica). A subset of Nocardia species  may not bedetected by this assay.  The Nocardia assays were tested for cross reactivity against 30  bacterial targets including common causes of respiratory pneumonia.  Cross-reactivity has been observed with Rhodococcus equi and  Crossiella cryophila. Micromonospora saelicesensis, Streptomyces  gardneri, Streptomyces platensis and Saccharothrix genus members  showed cross reactivity in silico (none of these have been reported  to cause human infections but observed only in reports of  environmental sampling).  This test was developed and its performance characteristics  determined by [facility]. It has not been cleared or approved  by the U.S. Food and Drug Administration. Results should be used in  conjunction with clinical findings, and shouldnot form the sole  basis for a diagnosis or treatment decision.  ____________________________________________________________  Performed at:  [facility]  [address] [place] [address], [address]  [place] [address]  : Ciro Pryor, PhD ALBERTO (ABB)  CLIA # [id number]  FLAG Interpretation: A = Abnormal, H = High, L = Low      Lactate Dehydrogenase, Fluid (02.11.25 @ 18:21)    Lactate Dehydrogenase, Fluid: 101: Reference Ranges have NOT been established for analytes in body fluids  because of variability in body fluid composition.  The  has not determined the efficacy of this test when  performed on fluid specimens. The performance characteristics of this  test were determined by [facility] [employer]. U/L        WBC Count: 16.84 (02-12-25 @ 00:36)  WBC Count: 16.96 (02-11-25 @ 19:34)  WBC Count: 15.69 (02-11-25 @ 00:32)  WBC Count: 9.11 (02-10-25 @ 00:29)  WBC Count: 8.71 (02-09-25 @ 00:20)  WBC Count: 9.68 (02-08-25 @ 00:39)  WBC Count: 9.48 (02-07-25 @ 00:35)  WBC Count: 9.28 (02-06-25 @ 00:20)       Bilirubin Total: 0.4 mg/dL (02-12-25 @ 00:35)  Aspartate Aminotransferase (AST/SGOT): 14 U/L (02-12-25 @ 00:35)  Alanine Aminotransferase (ALT/SGPT): 10 U/L (02-12-25 @ 00:35)  Alkaline Phosphatase: 107 U/L (02-12-25 @ 00:35)    Bilirubin Total: 0.4 mg/dL (02-11-25 @ 00:32)  Aspartate Aminotransferase (AST/SGOT): 17 U/L (02-11-25 @ 00:32)  Alanine Aminotransferase (ALT/SGPT): 9 U/L (02-11-25 @ 00:32)  Alkaline Phosphatase: 98 U/L (02-11-25 @ 00:32)    Bilirubin Total: 0.4 mg/dL (02-10-25 @ 00:29)  Aspartate Aminotransferase (AST/SGOT): 18 U/L (02-10-25 @ 00:29)  Alanine Aminotransferase (ALT/SGPT): 11 U/L (02-10-25 @ 00:29)  Alkaline Phosphatase: 97 U/L (02-10-25 @ 00:29)

## 2025-02-12 NOTE — PROGRESS NOTE ADULT - ASSESSMENT
54M with PMH of HTN, HLD, ESRD on HD MWF via LUE AVF, ascites (requiring repeat paracenteses q4-6wks), NICM with moderate LV dysfunction w/ EF 35-40%() and CAD s/p atherectomy + SALLIE to D1(2024) presents to [facility] transferred from [facility]. Patient initially presented to [facility] access [facility] ED with shortness of breath. Of note he was recently admitted from - for acute cholecystitis s/p cholecystectomy. In Critical access hospital ED, pt was hypoxic to 86% on RA, trop 1.7K, EKG no new changes, CXR fluid overload. Admitted for AHRF 2/2 fluid overload. Pt received HD on , ,  and . DAPT was resumed, TTE showed improvement in LVEF to 40-45%. Stress test was abnormal with 1mm inferior STD, reversible ischemia in the inferior wall and fixed defects in the lateral, anterior and apical walls with post stress EF of 24%.     Pt was then transferred to [facility] for cardiac cath which showed pLAD 70% ISR, mLCx 70%, D1 severe dz. Patient now transferred to [facility] CTS Dr. Rivers for CABG eval.# CAD s/p NSTEMI  Hx CAD s/p PCI LAD   Presented with ADHF elevated troponins  Positive NST1-Cath- pLAD 70% ISR, mLCx 70%, D1 severe dz.   TTE EF 35% Severe TRWas on Plavix-p2y12- 321 been off Plavix since -POD#1-C3L free LIMA-LAD; SVG-Diag; WOL-poqpJQ-Cddgknkay on  Sinus rhythm,Amio for AF prophylaxis  -OOB off  Sinus rhythm   -POD#3-On /pressors-EF 25-30% RV failure with transaminitis-Sinus Lpbive48/03-POD#4-Was intubated for hypoxia-gradual hypotension on /pressors had IABP inserted this morning  -POD#5-s/p IABP insertion, sepsis/septic shock due to GNR/ECOLI HD cath placed   Bronched yesterday 1/3 - minimal secretions with rust-tinged sputum   ESBL-E Coli bacteremia on meropenam    - POD#7 Atrial Fib ,remains intubated weaning IABP 1:3,off pressors on CRRT  -IABP removed.Remains on vent-Alex 10ppm,off Levo- CVP 10 / MAP 71 / Hct 25.6% / Lactate 1.7-Atrial Fibrillation  -Intubated on Alex 10ppm, gtt, BP better-AF~~90's on Amio-had bronch still febrile Tmax 31162/-Extubated awake alert on HFNC AF,on Dobutrex-CRRT,Cultures no growth    01/10-OOB on BiPAP Sinus Rhythm on -SR/ MAP 57/ CVP 10/  Hct 26.7 / Lact 1.4  -s/p EDU/DCCV-Sinus rhythm on -SR / MAP 52 / CVP 12/ Hct 25.8 / Lact 0.7-had bronch with BAL Left lung  -Sinus rhythm on -for repeat Bronch-SR / MAP 67 / CVP 11 / Hct 27.4% / Lact 0.8  -s/p Trach on  TTE EF 55%-SR / CVP 2 / MAP 63 / Hct 25.9% / Lactate 0.9  -Awake on Trach collar off  c/o buttock pain had bronch yesterday-BAL-Sinus rhythm  -Sinus rhythm on vent at night-BP stable may need to increase hydrallazine 25 mg q8  -TTE EF 60% still needs Vent support at night Bradycardic trial of Bryce now back on   -Awake alert Sinus rhythm BP elevated  remains on ,Nocturnal vent support  resume Hydralazine 25mg q8  -Reverted to AF rvr  -s/p BRonc/BAL debridement sacral decub  -Awake noted AF RVR no further bradyarrhythmias-Started BBlockers  02/10-Persistent AF tolerating Amio BBlockers Pericardial Effusion decreased consider resuming AC  -Seen by Thoracic-? Diaphragmatic plication,AF on HD    # Acute on Chronic Systolic Heart Failure now improved  EF-35% ,severe TR  Had HD post op  GDMT post op  LVEF improved post bypass   -TTE EF 40%,trace effusion  ECG c/w pericarditis-was on colchicine   01/15-TTE EF 55%  -TTE EF 60%   -Normal LV systolic function Moderate pericardial effusion  -On TC-HF and Vent support at nights   02/10-Vent HS with T Collar trial Ambulated Remains in AF  Repeat TTE Normal LV Systolic function Small Pericardial effusion decreased from 2/3        Vascular evaluated  AVGraft /?steal causing RV failure-'' Very low suspicion of steal Sd and AVF causing current clinical state. ''   VA Duplex Hemodialysis Access, Left (25 @ 13:35) >   Arterial flow volume of 1301 mL/m, and the venous flow volume of  1492 mL/m are consistent with a normally functioning dialysis access  fistula.      # Ascites  Hx chronic ascites  Has drainage q4-6 weeks  Last drained -4600mL  ? ascites related to severe TR  Had nwhskftfnhnk-Pnkgxex-jc growth   CT Abdomen 01/10-Large volume ascites-  US abdomen--Small to moderate volume abdominal/pelvic ascites      # ESRD  Now off CRRT transition to HD

## 2025-02-13 LAB
ALBUMIN SERPL ELPH-MCNC: 2.7 G/DL — LOW (ref 3.3–5)
ALBUMIN SERPL ELPH-MCNC: 2.7 G/DL — LOW (ref 3.3–5)
ALBUMIN SERPL ELPH-MCNC: 3 G/DL — LOW (ref 3.3–5)
ALBUMIN SERPL ELPH-MCNC: 3.3 G/DL — SIGNIFICANT CHANGE UP (ref 3.3–5)
ALP SERPL-CCNC: 104 U/L — SIGNIFICANT CHANGE UP (ref 40–120)
ALP SERPL-CCNC: 111 U/L — SIGNIFICANT CHANGE UP (ref 40–120)
ALP SERPL-CCNC: 117 U/L — SIGNIFICANT CHANGE UP (ref 40–120)
ALP SERPL-CCNC: 95 U/L — SIGNIFICANT CHANGE UP (ref 40–120)
ALT FLD-CCNC: 10 U/L — SIGNIFICANT CHANGE UP (ref 10–45)
ALT FLD-CCNC: 11 U/L — SIGNIFICANT CHANGE UP (ref 10–45)
ALT FLD-CCNC: 9 U/L — LOW (ref 10–45)
ALT FLD-CCNC: 9 U/L — LOW (ref 10–45)
ANION GAP SERPL CALC-SCNC: 11 MMOL/L — SIGNIFICANT CHANGE UP (ref 5–17)
ANION GAP SERPL CALC-SCNC: 13 MMOL/L — SIGNIFICANT CHANGE UP (ref 5–17)
ANION GAP SERPL CALC-SCNC: 15 MMOL/L — SIGNIFICANT CHANGE UP (ref 5–17)
ANION GAP SERPL CALC-SCNC: 16 MMOL/L — SIGNIFICANT CHANGE UP (ref 5–17)
APTT BLD: 175.9 SEC — CRITICAL HIGH (ref 24.5–35.6)
APTT BLD: 72.1 SEC — HIGH (ref 24.5–35.6)
AST SERPL-CCNC: 15 U/L — SIGNIFICANT CHANGE UP (ref 10–40)
AST SERPL-CCNC: 16 U/L — SIGNIFICANT CHANGE UP (ref 10–40)
AST SERPL-CCNC: 18 U/L — SIGNIFICANT CHANGE UP (ref 10–40)
AST SERPL-CCNC: 19 U/L — SIGNIFICANT CHANGE UP (ref 10–40)
BASOPHILS # BLD AUTO: 0.06 K/UL — SIGNIFICANT CHANGE UP (ref 0–0.2)
BASOPHILS NFR BLD AUTO: 0.4 % — SIGNIFICANT CHANGE UP (ref 0–2)
BILIRUB SERPL-MCNC: 0.4 MG/DL — SIGNIFICANT CHANGE UP (ref 0.2–1.2)
BILIRUB SERPL-MCNC: 0.4 MG/DL — SIGNIFICANT CHANGE UP (ref 0.2–1.2)
BILIRUB SERPL-MCNC: 0.5 MG/DL — SIGNIFICANT CHANGE UP (ref 0.2–1.2)
BILIRUB SERPL-MCNC: 0.5 MG/DL — SIGNIFICANT CHANGE UP (ref 0.2–1.2)
BUN SERPL-MCNC: 20 MG/DL — SIGNIFICANT CHANGE UP (ref 7–23)
BUN SERPL-MCNC: 26 MG/DL — HIGH (ref 7–23)
BUN SERPL-MCNC: 46 MG/DL — HIGH (ref 7–23)
BUN SERPL-MCNC: 48 MG/DL — HIGH (ref 7–23)
CALCIUM SERPL-MCNC: 8.8 MG/DL — SIGNIFICANT CHANGE UP (ref 8.4–10.5)
CALCIUM SERPL-MCNC: 9.1 MG/DL — SIGNIFICANT CHANGE UP (ref 8.4–10.5)
CALCIUM SERPL-MCNC: 9.1 MG/DL — SIGNIFICANT CHANGE UP (ref 8.4–10.5)
CALCIUM SERPL-MCNC: 9.3 MG/DL — SIGNIFICANT CHANGE UP (ref 8.4–10.5)
CHLORIDE SERPL-SCNC: 94 MMOL/L — LOW (ref 96–108)
CHLORIDE SERPL-SCNC: 94 MMOL/L — LOW (ref 96–108)
CHLORIDE SERPL-SCNC: 96 MMOL/L — SIGNIFICANT CHANGE UP (ref 96–108)
CHLORIDE SERPL-SCNC: 96 MMOL/L — SIGNIFICANT CHANGE UP (ref 96–108)
CO2 SERPL-SCNC: 25 MMOL/L — SIGNIFICANT CHANGE UP (ref 22–31)
CO2 SERPL-SCNC: 26 MMOL/L — SIGNIFICANT CHANGE UP (ref 22–31)
CO2 SERPL-SCNC: 28 MMOL/L — SIGNIFICANT CHANGE UP (ref 22–31)
CO2 SERPL-SCNC: 29 MMOL/L — SIGNIFICANT CHANGE UP (ref 22–31)
CREAT SERPL-MCNC: 4.1 MG/DL — HIGH (ref 0.5–1.3)
CREAT SERPL-MCNC: 5.88 MG/DL — HIGH (ref 0.5–1.3)
CREAT SERPL-MCNC: 6.04 MG/DL — HIGH (ref 0.5–1.3)
CREAT SERPL-MCNC: 6.47 MG/DL — HIGH (ref 0.5–1.3)
EGFR: 10 ML/MIN/1.73M2 — LOW
EGFR: 10 ML/MIN/1.73M2 — LOW
EGFR: 11 ML/MIN/1.73M2 — LOW
EGFR: 11 ML/MIN/1.73M2 — LOW
EGFR: 16 ML/MIN/1.73M2 — LOW
EGFR: 9 ML/MIN/1.73M2 — LOW
EGFR: 9 ML/MIN/1.73M2 — LOW
EOSINOPHIL # BLD AUTO: 1.17 K/UL — HIGH (ref 0–0.5)
EOSINOPHIL NFR BLD AUTO: 8 % — HIGH (ref 0–6)
GLUCOSE SERPL-MCNC: 144 MG/DL — HIGH (ref 70–99)
GLUCOSE SERPL-MCNC: 147 MG/DL — HIGH (ref 70–99)
GLUCOSE SERPL-MCNC: 169 MG/DL — HIGH (ref 70–99)
GLUCOSE SERPL-MCNC: 76 MG/DL — SIGNIFICANT CHANGE UP (ref 70–99)
HCT VFR BLD CALC: 25.2 % — LOW (ref 39–50)
HGB BLD-MCNC: 7.9 G/DL — LOW (ref 13–17)
IMM GRANULOCYTES NFR BLD AUTO: 0.7 % — SIGNIFICANT CHANGE UP (ref 0–0.9)
INR BLD: 1.37 RATIO — HIGH (ref 0.85–1.16)
INR BLD: 1.38 RATIO — HIGH (ref 0.85–1.16)
LYMPHOCYTES # BLD AUTO: 0.61 K/UL — LOW (ref 1–3.3)
LYMPHOCYTES # BLD AUTO: 4.2 % — LOW (ref 13–44)
MAGNESIUM SERPL-MCNC: 2.6 MG/DL — SIGNIFICANT CHANGE UP (ref 1.6–2.6)
MAGNESIUM SERPL-MCNC: 2.6 MG/DL — SIGNIFICANT CHANGE UP (ref 1.6–2.6)
MCHC RBC-ENTMCNC: 30.9 PG — SIGNIFICANT CHANGE UP (ref 27–34)
MCHC RBC-ENTMCNC: 31.3 G/DL — LOW (ref 32–36)
MCV RBC AUTO: 98.4 FL — SIGNIFICANT CHANGE UP (ref 80–100)
MONOCYTES # BLD AUTO: 1.1 K/UL — HIGH (ref 0–0.9)
MONOCYTES NFR BLD AUTO: 7.5 % — SIGNIFICANT CHANGE UP (ref 2–14)
NEUTROPHILS # BLD AUTO: 11.55 K/UL — HIGH (ref 1.8–7.4)
NRBC BLD AUTO-RTO: 0 /100 WBCS — SIGNIFICANT CHANGE UP (ref 0–0)
PHOSPHATE SERPL-MCNC: 4.5 MG/DL — SIGNIFICANT CHANGE UP (ref 2.5–4.5)
PHOSPHATE SERPL-MCNC: 4.6 MG/DL — HIGH (ref 2.5–4.5)
PLATELET # BLD AUTO: 259 K/UL — SIGNIFICANT CHANGE UP (ref 150–400)
POTASSIUM SERPL-MCNC: 5 MMOL/L — SIGNIFICANT CHANGE UP (ref 3.5–5.3)
POTASSIUM SERPL-MCNC: 5.7 MMOL/L — HIGH (ref 3.5–5.3)
POTASSIUM SERPL-MCNC: 6 MMOL/L — HIGH (ref 3.5–5.3)
POTASSIUM SERPL-MCNC: 6 MMOL/L — HIGH (ref 3.5–5.3)
POTASSIUM SERPL-SCNC: 5 MMOL/L — SIGNIFICANT CHANGE UP (ref 3.5–5.3)
POTASSIUM SERPL-SCNC: 5.7 MMOL/L — HIGH (ref 3.5–5.3)
POTASSIUM SERPL-SCNC: 6 MMOL/L — HIGH (ref 3.5–5.3)
POTASSIUM SERPL-SCNC: 6 MMOL/L — HIGH (ref 3.5–5.3)
PROT SERPL-MCNC: 5.9 G/DL — LOW (ref 6–8.3)
PROT SERPL-MCNC: 6.2 G/DL — SIGNIFICANT CHANGE UP (ref 6–8.3)
PROT SERPL-MCNC: 6.8 G/DL — SIGNIFICANT CHANGE UP (ref 6–8.3)
PROT SERPL-MCNC: 7.2 G/DL — SIGNIFICANT CHANGE UP (ref 6–8.3)
PROTHROM AB SERPL-ACNC: 15.6 SEC — HIGH (ref 9.9–13.4)
PROTHROM AB SERPL-ACNC: 15.7 SEC — HIGH (ref 9.9–13.4)
RBC # FLD: 22.1 % — HIGH (ref 10.3–14.5)
SODIUM SERPL-SCNC: 135 MMOL/L — SIGNIFICANT CHANGE UP (ref 135–145)
SODIUM SERPL-SCNC: 135 MMOL/L — SIGNIFICANT CHANGE UP (ref 135–145)
SODIUM SERPL-SCNC: 138 MMOL/L — SIGNIFICANT CHANGE UP (ref 135–145)
WBC # BLD: 14.59 K/UL — HIGH (ref 3.8–10.5)
WBC # FLD AUTO: 14.59 K/UL — HIGH (ref 3.8–10.5)

## 2025-02-13 PROCEDURE — 99222 1ST HOSP IP/OBS MODERATE 55: CPT

## 2025-02-13 PROCEDURE — 99233 SBSQ HOSP IP/OBS HIGH 50: CPT | Mod: 57

## 2025-02-13 PROCEDURE — G0545: CPT

## 2025-02-13 PROCEDURE — 99291 CRITICAL CARE FIRST HOUR: CPT

## 2025-02-13 PROCEDURE — 99232 SBSQ HOSP IP/OBS MODERATE 35: CPT

## 2025-02-13 PROCEDURE — 71045 X-RAY EXAM CHEST 1 VIEW: CPT | Mod: 26

## 2025-02-13 RX ORDER — SODIUM ZIRCONIUM CYCLOSILICATE 5 G/5G
10 POWDER, FOR SUSPENSION ORAL ONCE
Refills: 0 | Status: COMPLETED | OUTPATIENT
Start: 2025-02-13 | End: 2025-02-13

## 2025-02-13 RX ORDER — ALBUTEROL SULFATE 2.5 MG/3ML
2.5 VIAL, NEBULIZER (ML) INHALATION ONCE
Refills: 0 | Status: COMPLETED | OUTPATIENT
Start: 2025-02-13 | End: 2025-02-13

## 2025-02-13 RX ORDER — ALBUTEROL SULFATE 2.5 MG/3ML
2.5 VIAL, NEBULIZER (ML) INHALATION
Refills: 0 | Status: DISCONTINUED | OUTPATIENT
Start: 2025-02-13 | End: 2025-02-13

## 2025-02-13 RX ORDER — ALBUTEROL SULFATE 2.5 MG/3ML
2.5 VIAL, NEBULIZER (ML) INHALATION
Refills: 0 | Status: COMPLETED | OUTPATIENT
Start: 2025-02-13 | End: 2025-02-13

## 2025-02-13 RX ORDER — CALCIUM GLUCONATE 20 MG/ML
2 INJECTION, SOLUTION INTRAVENOUS ONCE
Refills: 0 | Status: COMPLETED | OUTPATIENT
Start: 2025-02-13 | End: 2025-02-13

## 2025-02-13 RX ORDER — ALBUTEROL SULFATE 2.5 MG/3ML
10 VIAL, NEBULIZER (ML) INHALATION ONCE
Refills: 0 | Status: DISCONTINUED | OUTPATIENT
Start: 2025-02-13 | End: 2025-02-13

## 2025-02-13 RX ORDER — SODIUM BICARBONATE 1 MEQ/ML
50 SYRINGE (ML) INTRAVENOUS ONCE
Refills: 0 | Status: COMPLETED | OUTPATIENT
Start: 2025-02-13 | End: 2025-02-13

## 2025-02-13 RX ADMIN — Medication 2.5 MILLIGRAM(S): at 04:35

## 2025-02-13 RX ADMIN — Medication 50 MILLIGRAM(S): at 22:34

## 2025-02-13 RX ADMIN — Medication 500 MILLIGRAM(S): at 12:11

## 2025-02-13 RX ADMIN — OXYCODONE HYDROCHLORIDE 10 MILLIGRAM(S): 30 TABLET ORAL at 12:09

## 2025-02-13 RX ADMIN — INSULIN LISPRO 2: 100 INJECTION, SOLUTION INTRAVENOUS; SUBCUTANEOUS at 12:35

## 2025-02-13 RX ADMIN — OXYCODONE HYDROCHLORIDE 10 MILLIGRAM(S): 30 TABLET ORAL at 07:29

## 2025-02-13 RX ADMIN — OXYCODONE HYDROCHLORIDE 10 MILLIGRAM(S): 30 TABLET ORAL at 08:00

## 2025-02-13 RX ADMIN — Medication 40 MILLIGRAM(S): at 12:25

## 2025-02-13 RX ADMIN — Medication 1 APPLICATION(S): at 23:03

## 2025-02-13 RX ADMIN — INSULIN LISPRO 2: 100 INJECTION, SOLUTION INTRAVENOUS; SUBCUTANEOUS at 00:18

## 2025-02-13 RX ADMIN — OXYCODONE HYDROCHLORIDE 10 MILLIGRAM(S): 30 TABLET ORAL at 16:44

## 2025-02-13 RX ADMIN — AMIODARONE HYDROCHLORIDE 200 MILLIGRAM(S): 50 INJECTION, SOLUTION INTRAVENOUS at 05:00

## 2025-02-13 RX ADMIN — ATORVASTATIN CALCIUM 80 MILLIGRAM(S): 80 TABLET, FILM COATED ORAL at 22:34

## 2025-02-13 RX ADMIN — MEROPENEM 100 MILLIGRAM(S): 1 INJECTION INTRAVENOUS at 15:57

## 2025-02-13 RX ADMIN — OXYCODONE HYDROCHLORIDE 10 MILLIGRAM(S): 30 TABLET ORAL at 23:05

## 2025-02-13 RX ADMIN — METHOCARBAMOL 500 MILLIGRAM(S): 500 TABLET, FILM COATED ORAL at 05:00

## 2025-02-13 RX ADMIN — Medication 1 TABLET(S): at 12:11

## 2025-02-13 RX ADMIN — Medication 300 MILLIGRAM(S): at 15:56

## 2025-02-13 RX ADMIN — Medication 2.5 MILLIGRAM(S): at 04:21

## 2025-02-13 RX ADMIN — Medication 10 MILLILITER(S): at 07:40

## 2025-02-13 RX ADMIN — OXYCODONE HYDROCHLORIDE 10 MILLIGRAM(S): 30 TABLET ORAL at 22:34

## 2025-02-13 RX ADMIN — SODIUM ZIRCONIUM CYCLOSILICATE 10 GRAM(S): 5 POWDER, FOR SUSPENSION ORAL at 01:08

## 2025-02-13 RX ADMIN — METHOCARBAMOL 500 MILLIGRAM(S): 500 TABLET, FILM COATED ORAL at 14:09

## 2025-02-13 RX ADMIN — Medication 5 MILLIGRAM(S): at 22:34

## 2025-02-13 RX ADMIN — INSULIN LISPRO 2: 100 INJECTION, SOLUTION INTRAVENOUS; SUBCUTANEOUS at 18:52

## 2025-02-13 RX ADMIN — METOPROLOL SUCCINATE 12.5 MILLIGRAM(S): 50 TABLET, EXTENDED RELEASE ORAL at 18:24

## 2025-02-13 RX ADMIN — POLYETHYLENE GLYCOL 3350 17 GRAM(S): 17 POWDER, FOR SOLUTION ORAL at 05:00

## 2025-02-13 RX ADMIN — Medication 50 MILLIEQUIVALENT(S): at 01:08

## 2025-02-13 RX ADMIN — Medication 1 SPRAY(S): at 18:37

## 2025-02-13 RX ADMIN — METHOCARBAMOL 500 MILLIGRAM(S): 500 TABLET, FILM COATED ORAL at 22:34

## 2025-02-13 RX ADMIN — Medication 81 MILLIGRAM(S): at 12:11

## 2025-02-13 RX ADMIN — METOPROLOL SUCCINATE 12.5 MILLIGRAM(S): 50 TABLET, EXTENDED RELEASE ORAL at 07:29

## 2025-02-13 RX ADMIN — Medication 1 APPLICATION(S): at 05:02

## 2025-02-13 RX ADMIN — MIRTAZAPINE 7.5 MILLIGRAM(S): 30 TABLET, FILM COATED ORAL at 12:11

## 2025-02-13 RX ADMIN — HEPARIN SODIUM 10 UNIT(S)/HR: 1000 INJECTION INTRAVENOUS; SUBCUTANEOUS at 07:39

## 2025-02-13 RX ADMIN — CALCIUM GLUCONATE 200 GRAM(S): 20 INJECTION, SOLUTION INTRAVENOUS at 01:08

## 2025-02-13 NOTE — PROGRESS NOTE ADULT - PROBLEM SELECTOR PLAN 1
- Please obtain VQ scan to reassess diaphragm function  - Sniff exam 2 /12 : left hemidiaphragm dysfunction   - will revaluate for candidacy for left diaphragm plication after testing completed  - Plan discussed with Dr. Abdullahi  - Continue care as per ICU team

## 2025-02-13 NOTE — PROGRESS NOTE ADULT - ASSESSMENT
54M with PMH of HTN, HLD, ESRD on HD MWF via LUE AVF, ascites (requiring repeat paracenteses q4-6wks), NICM with moderate LV dysfunction w/ EF 35-40%(2023) and CAD s/p atherectomy + SALLIE to D1(4/11/2024) presents to [facility] transferred from [facility] for CABG eval  Nephro on [facility] for HD    ESRD on HD   Regular schedule MWF   Access LUE AVF  Center [facility]  Nephrologist Dr. Bailey  S/p HD on 01/29, 2/3 and 02/05  S/p HD on 02/07   s/p hd 2/10 uf 3L  S/p PUF on -2/11 and HD on 02/12  HD again today for Hyperkalemia Overnight and also fluid removal target 2 L   Then regular HD tomm  Keep MAP>65  Renal Diet  Consent in physical chart    Hyper/Hypokalemia  Monitor K    HTN  bp acceptable  monitor bp     CKD MBD  Can send a PTH  Ca and Phos daily    Anemia  CRISTIANE with HD  Transfuse if hgb <7    CAD with multivessel disease  s/p CABG 12/30  CTICU following    Hypoxic Resp failure requiring Trach  Per surgery  S/p bronchoscopy, pulm following

## 2025-02-13 NOTE — PROGRESS NOTE ADULT - SUBJECTIVE AND OBJECTIVE BOX
Patient is a 54y old  Male who presents with a chief complaint of CAD s/p CABG (12 Feb 2025 06:27)    Being followed by ID for        Interval history:  No other acute events      ROS:  No cough,SOB,CP  No N/V/D  No abd pain  No urinary complaints  No HA  No joint or limb pain  No other complaints    PAST MEDICAL & SURGICAL HISTORY:  Type 2 diabetes mellitus      Hypertension      End stage renal disease  started HD 2/2019 T, Th, Sat via right chest permacath      Bone spur  right shoulder- hx of - sx done      Anemia      Injury of right wrist  hx of at age 14      History of hyperkalemia  before HD- K-6.2 - to repeat in am of sx, pt had HD today      S/P arthroscopy of right shoulder  2010      History of vascular access device  right chest permacath - 2/2019      H/O right wrist surgery  tendons repair - at age 14      AV fistula      H/O ventral hernia repair      S/P cholecystectomy        Allergies    No Known Allergies    Intolerances      Antimicrobials:    meropenem  IVPB 500 milliGRAM(s) IV Intermittent every 24 hours    MEDICATIONS  (STANDING):  albumin human  5% IVPB 250 milliLiter(s) IV Intermittent once  aMIOdarone    Tablet 200 milliGRAM(s) Oral daily  ascorbic acid 500 milliGRAM(s) Oral daily  aspirin  chewable 81 milliGRAM(s) Oral daily  atorvastatin 80 milliGRAM(s) Oral at bedtime  chlorhexidine 0.12% Liquid 15 milliLiter(s) Oral Mucosa every 12 hours  chlorhexidine 2% Cloths 1 Application(s) Topical daily  chlorhexidine 4% Liquid 1 Application(s) Topical <User Schedule>  dextrose 50% Injectable 50 milliLiter(s) IV Push every 15 minutes  epoetin ignacia (EPOGEN) Injectable 33722 Unit(s) IV Push <User Schedule>  ferrous    sulfate Liquid 300 milliGRAM(s) Enteral Tube daily  heparin  Infusion 500 Unit(s)/Hr (10 mL/Hr) IV Continuous <Continuous>  insulin lispro (ADMELOG) corrective regimen sliding scale   SubCutaneous every 6 hours  melatonin 5 milliGRAM(s) Oral at bedtime  meropenem  IVPB 500 milliGRAM(s) IV Intermittent every 24 hours  methocarbamol 500 milliGRAM(s) Oral every 8 hours  metoprolol tartrate 12.5 milliGRAM(s) Oral <User Schedule>  mirtazapine 7.5 milliGRAM(s) Oral daily  Nephro-elicia 1 Tablet(s) Oral daily  pantoprazole  Injectable 40 milliGRAM(s) IV Push daily  polyethylene glycol 3350 17 Gram(s) Oral two times a day  sodium chloride 0.65% Nasal 1 Spray(s) Both Nostrils every 12 hours  sodium chloride 0.9%. 1000 milliLiter(s) (10 mL/Hr) IV Continuous <Continuous>  traZODone 50 milliGRAM(s) Oral at bedtime      Vital Signs Last 24 Hrs  T(C): 37 (02-13-25 @ 12:00), Max: 37 (02-12-25 @ 20:00)  T(F): 98.6 (02-13-25 @ 12:00), Max: 98.6 (02-12-25 @ 20:00)  HR: 101 (02-13-25 @ 15:30) (71 - 112)  BP: 147/84 (02-13-25 @ 15:30) (94/50 - 147/84)  BP(mean): 98 (02-13-25 @ 15:30) (67 - 107)  RR: 14 (02-13-25 @ 15:30) (11 - 62)  SpO2: 98% (02-13-25 @ 15:30) (84% - 100%)    Physical Exam:    Constitutional well preserved,comfortable,pleasant    HEENT PERRLA EOMI,No pallor or icterus    No oral exudate or erythema    Neck supple no JVD or LN    Chest Good AE,CTA    CVS RRR S1 S2 WNl No murmur or rub or gallop    Abd soft BS normal No tenderness no masses    Ext No cyanosis clubbing or edema    IV site no erythema tenderness or discharge    Joints no swelling or LOM    CNS AAO X 3 no focal    Lab Data:                          7.9    14.59 )-----------( 259      ( 13 Feb 2025 00:20 )             25.2       02-13    x   |  x   |  20  ----------------------------<  x   x    |  x   |  x     Ca    9.3      13 Feb 2025 13:02  Phos  4.5     02-13  Mg     2.6     02-13    TPro  6.8  /  Alb  3.0[L]  /  TBili  0.4  /  DBili  x   /  AST  16  /  ALT  10  /  AlkPhos  111  02-13      Urinalysis Basic - ( 13 Feb 2025 13:02 )    Color: x / Appearance: x / SG: x / pH: x  Gluc: 169 mg/dL / Ketone: x  / Bili: x / Urobili: x   Blood: x / Protein: x / Nitrite: x   Leuk Esterase: x / RBC: x / WBC x   Sq Epi: x / Non Sq Epi: x / Bacteria: x        Ascites Fl  02-11-25   No growth  --    polymorphonuclear leukocytes seen  No organisms seen  by cytocentrifuge      .Sputum Sputum  02-11-25   Commensal manjit consistent with body site  --    Rare polymorphonuclear leukocytes per low power field  No Squamous epithelial cells per low power field  Rare Gram Negative Rods seen per oil power field                    WBC Count: 14.59 (02-13-25 @ 00:20)  WBC Count: 16.84 (02-12-25 @ 00:36)  WBC Count: 16.96 (02-11-25 @ 19:34)  WBC Count: 15.69 (02-11-25 @ 00:32)  WBC Count: 9.11 (02-10-25 @ 00:29)  WBC Count: 8.71 (02-09-25 @ 00:20)  WBC Count: 9.68 (02-08-25 @ 00:39)  WBC Count: 9.48 (02-07-25 @ 00:35)       Bilirubin Total: 0.4 mg/dL (02-13-25 @ 13:02)  Aspartate Aminotransferase (AST/SGOT): 16 U/L (02-13-25 @ 13:02)  Alanine Aminotransferase (ALT/SGPT): 10 U/L (02-13-25 @ 13:02)  Alkaline Phosphatase: 111 U/L (02-13-25 @ 13:02)  Bilirubin Total: 0.4 mg/dL (02-13-25 @ 04:05)  Aspartate Aminotransferase (AST/SGOT): 15 U/L (02-13-25 @ 04:05)  Alanine Aminotransferase (ALT/SGPT): 9 U/L (02-13-25 @ 04:05)  Alkaline Phosphatase: 95 U/L (02-13-25 @ 04:05)  Bilirubin Total: 0.5 mg/dL (02-13-25 @ 00:20)  Aspartate Aminotransferase (AST/SGOT): 18 U/L (02-13-25 @ 00:20)  Alanine Aminotransferase (ALT/SGPT): 9 U/L (02-13-25 @ 00:20)  Alkaline Phosphatase: 104 U/L (02-13-25 @ 00:20)  Bilirubin Total: 0.5 mg/dL (02-12-25 @ 20:07)  Aspartate Aminotransferase (AST/SGOT): 17 U/L (02-12-25 @ 20:07)  Alanine Aminotransferase (ALT/SGPT): 12 U/L (02-12-25 @ 20:07)  Alkaline Phosphatase: 117 U/L (02-12-25 @ 20:07)  Bilirubin Total: 0.4 mg/dL (02-12-25 @ 00:35)  Aspartate Aminotransferase (AST/SGOT): 14 U/L (02-12-25 @ 00:35)  Alanine Aminotransferase (ALT/SGPT): 10 U/L (02-12-25 @ 00:35)  Alkaline Phosphatase: 107 U/L (02-12-25 @ 00:35)  Bilirubin Total: 0.4 mg/dL (02-11-25 @ 00:32)  Aspartate Aminotransferase (AST/SGOT): 17 U/L (02-11-25 @ 00:32)  Alanine Aminotransferase (ALT/SGPT): 9 U/L (02-11-25 @ 00:32)  Alkaline Phosphatase: 98 U/L (02-11-25 @ 00:32)  Bilirubin Total: 0.4 mg/dL (02-10-25 @ 00:29)  Aspartate Aminotransferase (AST/SGOT): 18 U/L (02-10-25 @ 00:29)  Alanine Aminotransferase (ALT/SGPT): 11 U/L (02-10-25 @ 00:29)  Alkaline Phosphatase: 97 U/L (02-10-25 @ 00:29)  Bilirubin Total: 0.4 mg/dL (02-09-25 @ 00:21)  Aspartate Aminotransferase (AST/SGOT): 18 U/L (02-09-25 @ 00:21)  Alanine Aminotransferase (ALT/SGPT): 10 U/L (02-09-25 @ 00:21)  Alkaline Phosphatase: 99 U/L (02-09-25 @ 00:21)         Patient is a 54y old  Male who presents with a chief complaint of CAD s/p CABG (12 Feb 2025 06:27)    Being followed by ID for        Interval history:  concern about left hemidiaphragm- thoracic surgery is following   pt encouraged to breathe deeply  sacral decubitus less bothersome  No other acute events        PAST MEDICAL & SURGICAL HISTORY:  Type 2 diabetes mellitus      Hypertension      End stage renal disease  started HD 2/2019 T, Th, Sat via right chest permacath      Bone spur  right shoulder- hx of - sx done      Anemia      Injury of right wrist  hx of at age 14      History of hyperkalemia  before HD- K-6.2 - to repeat in am of sx, pt had HD today      S/P arthroscopy of right shoulder  2010      History of vascular access device  right chest permacath - 2/2019      H/O right wrist surgery  tendons repair - at age 14      AV fistula      H/O ventral hernia repair      S/P cholecystectomy        Allergies    No Known Allergies    Intolerances      Antimicrobials:    meropenem  IVPB 500 milliGRAM(s) IV Intermittent every 24 hours    MEDICATIONS  (STANDING):  albumin human  5% IVPB 250 milliLiter(s) IV Intermittent once  aMIOdarone    Tablet 200 milliGRAM(s) Oral daily  ascorbic acid 500 milliGRAM(s) Oral daily  aspirin  chewable 81 milliGRAM(s) Oral daily  atorvastatin 80 milliGRAM(s) Oral at bedtime  chlorhexidine 0.12% Liquid 15 milliLiter(s) Oral Mucosa every 12 hours  chlorhexidine 2% Cloths 1 Application(s) Topical daily  chlorhexidine 4% Liquid 1 Application(s) Topical <User Schedule>  dextrose 50% Injectable 50 milliLiter(s) IV Push every 15 minutes  epoetin ignacia (EPOGEN) Injectable 96890 Unit(s) IV Push <User Schedule>  ferrous    sulfate Liquid 300 milliGRAM(s) Enteral Tube daily  heparin  Infusion 500 Unit(s)/Hr (10 mL/Hr) IV Continuous <Continuous>  insulin lispro (ADMELOG) corrective regimen sliding scale   SubCutaneous every 6 hours  melatonin 5 milliGRAM(s) Oral at bedtime  meropenem  IVPB 500 milliGRAM(s) IV Intermittent every 24 hours  methocarbamol 500 milliGRAM(s) Oral every 8 hours  metoprolol tartrate 12.5 milliGRAM(s) Oral <User Schedule>  mirtazapine 7.5 milliGRAM(s) Oral daily  Nephro-elicia 1 Tablet(s) Oral daily  pantoprazole  Injectable 40 milliGRAM(s) IV Push daily  polyethylene glycol 3350 17 Gram(s) Oral two times a day  sodium chloride 0.65% Nasal 1 Spray(s) Both Nostrils every 12 hours  sodium chloride 0.9%. 1000 milliLiter(s) (10 mL/Hr) IV Continuous <Continuous>  traZODone 50 milliGRAM(s) Oral at bedtime      Vital Signs Last 24 Hrs  T(C): 37 (02-13-25 @ 12:00), Max: 37 (02-12-25 @ 20:00)  T(F): 98.6 (02-13-25 @ 12:00), Max: 98.6 (02-12-25 @ 20:00)  HR: 101 (02-13-25 @ 15:30) (71 - 112)  BP: 147/84 (02-13-25 @ 15:30) (94/50 - 147/84)  BP(mean): 98 (02-13-25 @ 15:30) (67 - 107)  RR: 14 (02-13-25 @ 15:30) (11 - 62)  SpO2: 98% (02-13-25 @ 15:30) (84% - 100%)    Physical Exam:    Constitutional well preserved,comfortable,pleasant    HEENT PERRLA EOMI,No pallor or icterus    No trach    Neck supple no JVD or LN    Chest occasional rhonchi    CVS S1 S2     Abd soft BS normal No tenderness     Ext No cyanosis clubbing or edema    IV site no erythema tenderness or discharge    Joints no swelling or LOM    CNS AAO X 3 no focal    Lab Data:                          7.9    14.59 )-----------( 259      ( 13 Feb 2025 00:20 )             25.2       02-13    x   |  x   |  20  ----------------------------<  x   x    |  x   |  x     Ca    9.3      13 Feb 2025 13:02  Phos  4.5     02-13  Mg     2.6     02-13    TPro  6.8  /  Alb  3.0[L]  /  TBili  0.4  /  DBili  x   /  AST  16  /  ALT  10  /  AlkPhos  111  02-13      Ascites Fl  02-11-25   No growth  --    polymorphonuclear leukocytes seen  No organisms seen  by cytocentrifuge      .Sputum Sputum  02-11-25   Commensal manjit consistent with body site  --    Rare polymorphonuclear leukocytes per low power field  No Squamous epithelial cells per low power field  Rare Gram Negative Rods seen per oil power field      WBC Count: 14.59 (02-13-25 @ 00:20)  WBC Count: 16.84 (02-12-25 @ 00:36)  WBC Count: 16.96 (02-11-25 @ 19:34)  WBC Count: 15.69 (02-11-25 @ 00:32)  WBC Count: 9.11 (02-10-25 @ 00:29)  WBC Count: 8.71 (02-09-25 @ 00:20)  WBC Count: 9.68 (02-08-25 @ 00:39)  WBC Count: 9.48 (02-07-25 @ 00:35)       Bilirubin Total: 0.4 mg/dL (02-13-25 @ 13:02)  Aspartate Aminotransferase (AST/SGOT): 16 U/L (02-13-25 @ 13:02)  Alanine Aminotransferase (ALT/SGPT): 10 U/L (02-13-25 @ 13:02)  Alkaline Phosphatase: 111 U/L (02-13-25 @ 13:02)    Bilirubin Total: 0.4 mg/dL (02-13-25 @ 04:05)  Aspartate Aminotransferase (AST/SGOT): 15 U/L (02-13-25 @ 04:05)  Alanine Aminotransferase (ALT/SGPT): 9 U/L (02-13-25 @ 04:05)  Alkaline Phosphatase: 95 U/L (02-13-25 @ 04:05)    < from: Xray Chest 1 View- PORTABLE-Urgent (Xray Chest 1 View- PORTABLE-Urgent .) (02.13.25 @ 11:35) >    ACC: [id number] EXAM:  XR CHEST PORTABLE ROUTINE 1V   ORDERED BY: COSTA CHIN     ACC: [id number] EXAM:  XR CHEST PORTABLE URGENT 1V   ORDERED BY: AC GREY     ACC: [id number] EXAM:  XR CHEST PORTABLE URGENT 1V   ORDERED BY: NARESH GAO     ACC: [id number] EXAM:  XR CHEST PORTABLE URGENT 1V   ORDERED BY: DIEGO ORTIZ DATE:  02/12/2025          INTERPRETATION:  EXAMINATION: XR CHEST URGENT, XR CHEST URGENT, XR CHEST   URGENT, XR CHEST    CLINICAL INDICATION: post op    TECHNIQUE: Single frontal portable view of the chest from 2/12/2025 8:32   PM, 2/13/2025 5:59 AM, 8:41 AM, 11:01 AM    COMPARISON: Chest x-ray 2/12/2025 3:17 PM.    FINDINGS:  2/12/2025 8:32 PM  Sternotomy. Enteric tube terminates in the stomach. Tracheostomy.  The heart size is not accurately assessed on this projection.  Near complete opacification of the left hemithorax, new since prior exam.  No pneumothorax.    2/13/2025 5:59 AM  Complete opacification left hemithorax with volume loss.    2/13/2025 8:41 AM  No significant change..    2/13/2025 11:01 AM.  Significant interval clearing of the left hemithorax with persistent   opacification of the left lung base.    IMPRESSION:  Complete opacification of the left hemithorax with interval clearing and   persistent opacification of the left lung base, likely improvement in   atelectasis.    --- End of Report ---    < end of copied text >        < from: Xray Fluoro of the Diaphragm (02.12.25 @ 16:13) >    Preliminary  radiograph of the chest demonstrate median sternotomy   wires and an enteric tube terminates in stomach    Regular, deep, and sniffing respirations in the frontal and lateral views   demonstrate decreased motion of the left hemidiaphragm with paradoxical   motion visualized.    IMPRESSION:  Left hemidiaphragm dysfunction    < end of copied text >

## 2025-02-13 NOTE — PROGRESS NOTE ADULT - ASSESSMENT
54M with HTN, HLD, ESRD on HD MWF via LUE AVF, ascites (requiring repeat paracenteses q4-6wks), NICM with moderate LV dysfunction w/ EF 35-40%() and CAD s/p atherectomy + SALLIE to D1 (2024) presents to [facility] 2024 as transfer from [facility] (via [facility]) where he presented  with SOB.   In [facility] ED, pt was hypoxic to 86% on RA, trop 1.7K, EKG no new changes, CXR fluid overload. Admitted for AHRF 2/2 fluid overload. Pt received HD on , ,  and . DAPT was resumed, TTE showed improvement in LVEF to 40-45%. Stress test was abnormal with 1mm inferior STD, reversible ischemia in the inferior wall and fixed defects in the lateral, anterior and apical walls with post stress EF of 24%. Pt was then transferred to [facility] for cardiac cath which showed pLAD 70% ISR, mLCx 70%, D1 severe dz. Patient now transferred to [facility] 2024 for CABG.       - underwent  C3L free LIMA-LAD; SVG-Diag; SVG-leftPL   - extubated   - hypotension and ECHO showing Systolic HF/depressed BIV Function, currently supported with /pressors.  Labs this evening showing transaminitis   - hypotensive, febrile and reintubated  1/3 - cultures (+) E coli +ESBL bacteremia   - underwent tracheostomy   - left lung collapse s/p bronchoscopy   - seen by plastic surgery / colorectal for sacral wound, no surgical intervention, no evidence of fistula, BC sent  - c/o right arm  pain, started on acyclovir for positive HSV PCR     doing a little better tissue culture with serratia; bronch also growing some yeast and VREC   back with nodule with some discharge    sacral decubitus less tender, some bloody secretions from trach  - still with drainage from back lesion  s/p I & D of lesion and sacrum   noted left foot numness , foot drop   2/10 - completed abs for back- dried up  local care for decubitus. thoracic surgery to assess left diaphragm    jump in WBC   WBC slightly less    Recommendations  - completed linezolid course  -completed meropenem  completed abs for back   - surgical f/u appreciated   - check cultures from bronch  neuro input appreciated  - local care for sacral decubitus, more comfortable     checked BC x 2 sets for jump in WBC, if repeat still elevated or any change in status  paracentesis not c/w infection   for further suctioning and evaluation of lung  for empiric meropenem pending cultures  changed lines   checked sputum culture   ? repeat echo      Marla Champion M.D. ,   please reach via teams   If no answer, or after 5PM/ weekends,  then please call  [phone]    Assessment and plan discussed with the primary team .

## 2025-02-13 NOTE — PROGRESS NOTE ADULT - SUBJECTIVE AND OBJECTIVE BOX
Brief history :   55 yo M with multiple medical problems including CAD s/p PCI in 2024 s/p CABG x 3 on 24    1: Intubated for hypoxia & left lung white out s/p awake bronch with removal of copious mucopurulent secretions  : s/p IABP insertion, sepsis/septic shock due to E coli   ESBL pneumonia, collapsed Left Lung, s/p multiple bronch    tracheostomy tube placement    VRE E. Faecium Bcx   +HSV PCR BAL   tissue cx from back abscess - Serratia/MRSA  - - intermittent bradycardia/junctional episodes with hypotension  2/3: off , off theophylline. iHD 1L UF  : bronch for Left lung collapse wound vac, 2decho w/ moderate pericardial effusion - similar as before, US abd: small - moderate ascites - monitor at this time.  Wound vac placed for sacral wounds  : iHD-1L UF  : bronch today off positive pressure   : concern for left food drop   2/10 : no acute issues - CXR shows improving left sided aeration, pt subjectively reports having difficulty while lying flat  : s/p Paracentesis 3.5 L removed, leukocytosis   : Ascites fluid PMN < 250 (less likely SBP)       ICU Vital Signs Last 24 Hrs  T(C): 36.9 (25 @ 04:00), Max: 37 (25 @ 12:05)  T(F): 98.4 (25 @ 04:00), Max: 98.6 (25 @ 12:05)  HR: 100 (25 @ 06:00) (57 - 118)  BP: 94/50 (25 @ 06:00) (94/50 - 163/81)  BP(mean): 67 (25 @ 06:00) (67 - 117)  ABP: --  ABP(mean): --  RR: 13 (25 @ 06:00) (10 - 62)  SpO2: 96% (25 @ 06:00) (78% - 100%)    I&O's Summary    2025 07:01  -  2025 07:00  --------------------------------------------------------  IN: 2219 mL / OUT: 1100 mL / NET: 1119 mL    Mode: CPAP with PS  FiO2: 40  PEEP: 5  PS: 5  ITime: 1  MAP: 7  PIP: 13                        7.9    14.59 )-----------( 259      ( 2025 00:20 )             25.2     2025 04:05    135    |  96     |  48     ----------------------------<  76     5.7     |  28     |  6.04     Ca    9.1        2025 04:05  Phos  4.5       2025 04:05  Mg     2.6       2025 04:05    TPro  5.9    /  Alb  2.7    /  TBili  0.4    /  DBili  x      /  AST  15     /  ALT  9      /  AlkPhos  95     2025 04:05    PT/INR - ( 2025 05:19 )   PT: 15.6 sec;   INR: 1.37 ratio       PTT - ( 2025 05:19 )  PTT:72.1 sec    Culture - Body Fluid with Gram Stain (collected 2025 18:21)  Source: Ascites Fl  Gram Stain (2025 23:44):    polymorphonuclear leukocytes seen    No organisms seen    by cytocentrifuge  Preliminary Report (2025 19:35):    No growth    Culture - Sputum (collected 2025 10:28)  Source: .Sputum Sputum  Gram Stain (2025 19:15):    Rare polymorphonuclear leukocytes per low power field    No Squamous epithelial cells per low power field    Rare Gram Negative Rods seen per oil power field  Final Report (2025 22:55):    Commensal manjit consistent with body site      MEDICATIONS  (STANDING):  albumin human  5% IVPB 250 milliLiter(s) IV Intermittent once  aMIOdarone    Tablet 200 milliGRAM(s) Oral daily  ascorbic acid 500 milliGRAM(s) Oral daily  aspirin  chewable 81 milliGRAM(s) Oral daily  atorvastatin 80 milliGRAM(s) Oral at bedtime  chlorhexidine 0.12% Liquid 15 milliLiter(s) Oral Mucosa every 12 hours  chlorhexidine 2% Cloths 1 Application(s) Topical daily  chlorhexidine 4% Liquid 1 Application(s) Topical <User Schedule>  dextrose 50% Injectable 50 milliLiter(s) IV Push every 15 minutes  epoetin ignacia (EPOGEN) Injectable 04683 Unit(s) IV Push <User Schedule>  ferrous    sulfate Liquid 300 milliGRAM(s) Enteral Tube daily  heparin  Infusion 500 Unit(s)/Hr IV Continuous <Continuous>  insulin lispro (ADMELOG) corrective regimen sliding scale   SubCutaneous every 6 hours  melatonin 5 milliGRAM(s) Oral at bedtime  meropenem  IVPB 500 milliGRAM(s) IV Intermittent every 24 hours  methocarbamol 500 milliGRAM(s) Oral every 8 hours  metoprolol tartrate 12.5 milliGRAM(s) Oral <User Schedule>  mirtazapine 7.5 milliGRAM(s) Oral daily  Nephro-elicia 1 Tablet(s) Oral daily  pantoprazole  Injectable 40 milliGRAM(s) IV Push daily  polyethylene glycol 3350 17 Gram(s) Oral two times a day  sodium chloride 0.65% Nasal 1 Spray(s) Both Nostrils every 12 hours  sodium chloride 0.9%. 1000 milliLiter(s) IV Continuous <Continuous>  traZODone 50 milliGRAM(s) Oral at bedtime    Home Medications:  aspirin 81 mg oral delayed release tablet: 1 tab(s) orally once a day (23 Dec 2024 16:58)  calcium acetate 667 mg oral tablet: 1 tab(s) orally 3 times a day (23 Dec 2024 16:58)  carvedilol 12.5 mg oral tablet: 1 tab(s) orally every 12 hours (23 Dec 2024 16:56)  clopidogrel 75 mg oral tablet: 1 tab(s) orally once a day (23 Dec 2024 16:56)  furosemide 20 mg oral tablet: 1 tab(s) orally once a day (23 Dec 2024 16:58)  hydrALAZINE 25 mg oral tablet: 1 tab(s) orally 3 times a day (23 Dec 2024 16:58)  lisinopril 40 mg oral tablet: 1 tab(s) orally once a day (23 Dec 2024 16:58)  lovastatin 10 mg oral tablet: 1 tab(s) orally once a day (23 Dec 2024 16:56)  NIFEdipine 90 mg oral tablet, extended release: 1 tab(s) orally once a day (23 Dec 2024 16:58)  Emani-Elicia oral tablet: 1 tab(s) orally once a day (23 Dec 2024 16:58)  traZODone 100 mg oral tablet: 1 tab(s) orally once a day (at bedtime) (23 Dec 2024 16:56)    PHYSICAL EXAM:  Gen : no acute distress  Neck: + trach   Respiratory: decreased in the bases  Cardiovascular: S1 and S2, RRR, no M/G/R  Gastrointestinal: distended, soft   Extremities: No peripheral edema  Vascular: 2+ peripheral pulses  Neurological: A/O x 3, Left foot drop when ambulating       S/p CABG  Respiratory failure due to recurrent left lung plugging now s/p trach  vent weaning   ESRD on HD  Post op AFib   History of RV dysfunction   h/o Recurrent ascites   Acute blood loss anemia   Post operative pain   Sacral decub   recurrent ascites likely from Chronic RV failure - s/p paracentesis     55 yo with history of  ESRD on HD, chronic RV dysfunction with recurrent ascites, s/p CABG on .  Post op course complicated by sepsis/pneumonia  Now Current active issue is respiratory failure due to left sided mucus plugging and poor cough and inability to manage secretion.     Neuro - pain management   Foot drop - likely peripheral nerve injury   Continue PT    CVS - recovering from CABG - ECHO  LV EF nl, RV remains dilated and hypokinetic (chronic)  AFIB - PO Amio for afib prophylaxis, BB for rate control - anticoagulation started  Chronic RV Dysfunction     Pulm - vent weaning - pt now asking to stay on vent due to feeling SOB when lying flat - has known ascites, discussed draining ascites to see if symptoms improve  Concern diaphragmatic dysfunction - will need formal Sniff study for diagnosis   Tolerating TC when sitting upright   Wean vent   Decrease PS   Consider trach down sizing in the next few days    GI - TF  Discussed with team drainage of ascites yesterday  GI proph/Bowel regimen     - tolerated HD     Heme - acute on chronic anemia (post op)  ASA / Statin  FE/EPO  Anticoagulation for PAF     ID - completed antibiotics for skin and soft tissue infection  Leukocytosis - sent sputum cultures, blood cultures yesterday   Empiric Antibiotic (Andrew)     Skin - Sacral decub - wound care, Turning Q2   Optimize nutrition          Critical care time spent    min       Care plan discussed with the ICU care team.   Patient remains critical, at risk for life threatening decompensation.    I have spent 30 minutes providing critical care management to this patient.    By signing my name below, I, Baldemar Hernandez, attest that this documentation has been prepared under the direction and in the presence of Herminio Mendez MD.   Electronically signed: Baldemar Hernandez, 25 @ 07:18    I, Herminio Mendez, personally performed the services described in this documentation. All medical record entries made by the scribe were at my direction and in my presence. I have reviewed the chart and agree that the record reflects my personal performance and is accurate and complete  Electronically signed: Herminio Mednez MD.  Brief history :   55 yo M with multiple medical problems including CAD s/p PCI in 2024 s/p CABG x 3 on 24    1: Intubated for hypoxia & left lung white out s/p awake bronch with removal of copious mucopurulent secretions  : s/p IABP insertion, sepsis/septic shock due to E coli   ESBL pneumonia, collapsed Left Lung, s/p multiple bronch    tracheostomy tube placement    VRE E. Faecium Bcx   +HSV PCR BAL   tissue cx from back abscess - Serratia/MRSA  - - intermittent bradycardia/junctional episodes with hypotension  2/3: off , off theophylline. iHD 1L UF  : bronch for Left lung collapse wound vac, 2decho w/ moderate pericardial effusion - similar as before, US abd: small - moderate ascites - monitor at this time.  Wound vac placed for sacral wounds  : iHD-1L UF  : bronch today off positive pressure   : concern for left food drop   2/10 : no acute issues - CXR shows improving left sided aeration, pt subjectively reports having difficulty while lying flat  : s/p Paracentesis 3.5 L removed, leukocytosis   : Ascites fluid PMN < 250 (less likely SBP)   sniff test yesterday showed paradoxical diaphragmatic motion    : mucus plugging but able to clear airway with aggressive pulmonary toileting.     ICU Vital Signs Last 24 Hrs  T(C): 36.9 (25 @ 04:00), Max: 37 (25 @ 12:05)  T(F): 98.4 (25 @ 04:00), Max: 98.6 (25 @ 12:05)  HR: 100 (25 @ 06:00) (57 - 118)  BP: 94/50 (25 @ 06:00) (94/50 - 163/81)  BP(mean): 67 (25 @ 06:00) (67 - 117)  RR: 13 (25 @ 06:00) (10 - 62)  SpO2: 96% (25 @ 06:00) (78% - 100%)    I&O's Summary    2025 07:01  -  2025 07:00  --------------------------------------------------------  IN: 2219 mL / OUT: 1100 mL / NET: 1119 mL    Mode: CPAP with PS  FiO2: 40  PEEP: 5  PS: 5  ITime: 1  MAP: 7  PIP: 13                        7.9    14.59 )-----------( 259      ( 2025 00:20 )             25.2     2025 04:05    135    |  96     |  48     ----------------------------<  76     5.7     |  28     |  6.04     Ca    9.1        2025 04:05  Phos  4.5       2025 04:05  Mg     2.6       2025 04:05    TPro  5.9    /  Alb  2.7    /  TBili  0.4    /  DBili  x      /  AST  15     /  ALT  9      /  AlkPhos  95     2025 04:05    PT/INR - ( 2025 05:19 )   PT: 15.6 sec;   INR: 1.37 ratio    PTT - ( 2025 05:19 )  PTT:72.1 sec    Culture - Body Fluid with Gram Stain (collected 2025 18:21)  Source: Ascites Fl  Gram Stain (2025 23:44):    polymorphonuclear leukocytes seen    No organisms seen    by cytocentrifuge  Preliminary Report (2025 19:35):    No growth    Culture - Sputum (collected 2025 10:28)  Source: .Sputum Sputum  Gram Stain (2025 19:15):    Rare polymorphonuclear leukocytes per low power field    No Squamous epithelial cells per low power field    Rare Gram Negative Rods seen per oil power field  Final Report (2025 22:55):    Commensal manjit consistent with body site    MEDICATIONS  (STANDING):  aMIOdarone    Tablet 200 milliGRAM(s) Oral daily  ascorbic acid 500 milliGRAM(s) Oral daily  aspirin  chewable 81 milliGRAM(s) Oral daily  atorvastatin 80 milliGRAM(s) Oral at bedtime  epoetin ignacia (EPOGEN) Injectable 99958 Unit(s) IV Push <User Schedule>  ferrous    sulfate Liquid 300 milliGRAM(s) Enteral Tube daily  heparin  Infusion 500 Unit(s)/Hr IV Continuous <Continuous>  insulin lispro (ADMELOG) corrective regimen sliding scale   SubCutaneous every 6 hours  melatonin 5 milliGRAM(s) Oral at bedtime  meropenem  IVPB 500 milliGRAM(s) IV Intermittent every 24 hours  methocarbamol 500 milliGRAM(s) Oral every 8 hours  metoprolol tartrate 12.5 milliGRAM(s) Oral <User Schedule>  mirtazapine 7.5 milliGRAM(s) Oral daily  Nephro-elicia 1 Tablet(s) Oral daily  pantoprazole  Injectable 40 milliGRAM(s) IV Push daily  polyethylene glycol 3350 17 Gram(s) Oral two times a day  traZODone 50 milliGRAM(s) Oral at bedtime    PHYSICAL EXAM:  Gen : no acute distress  Neck: + trach   Respiratory: decreased in the bases  Cardiovascular: S1 and S2, RRR, no M/G/R  Gastrointestinal: distended, soft   Extremities: No peripheral edema  Vascular: 2+ peripheral pulses  Neurological: A/O x 3, Left foot drop when ambulating       S/p CABG  Respiratory failure due to recurrent left lung plugging now s/p trach  vent weaning   ESRD on HD  Post op AFib   History of RV dysfunction   h/o Recurrent ascites   Acute blood loss anemia   Post operative pain   Sacral decub   Recurrent ascites likely from Chronic RV failure - s/p paracentesis   Left Diaphragm dysfunction     55 yo with history of  ESRD on HD, chronic RV dysfunction with recurrent ascites, s/p CABG on .  Post op course complicated by sepsis/pneumonia  Now Current active issue is respiratory failure due to left sided mucus plugging and poor cough and inability to manage secretion.   Sniff test  showed paradoxical diaphragm motion.     Neuro - pain management   Foot drop - likely peripheral nerve injury   Continue PT    CVS - recovering from CABG - ECHO  LV EF nl, RV remains dilated and hypokinetic (chronic)  AFIB - PO Amio for afib prophylaxis, BB for rate control - anticoagulation started but held for possible procedure tmr   Chronic RV Dysfunction     Pulm - vent weaning   Tolerating TC when sitting upright   Wean vent   Decrease PS   Being evaluated for possible left diaphragm plication   needs aggressive pulm toileting     GI - TF  Discussed with team drainage of ascites s/p drainage on   GI Proph/Bowel regimen     - tolerated HD   persistent hyperkalemia post HD - AV Fistula study for s/o recirculation     Heme - acute on chronic anemia (post op)  ASA / Statin  FE/EPO  Anticoagulation for PAF     ID - completed antibiotics for skin and soft tissue infection  Leukocytosis - sent sputum cultures, blood cultures yesterday   Empiric Antibiotic (Andrew)     Skin - Sacral decub - wound care, Turning Q2   Optimize nutrition      Critical care time spent  45  min     Care plan discussed with the ICU care team.   Patient remains critical, at risk for life threatening decompensation.    By signing my name below, I, Baldemar Hernandez, attest that this documentation has been prepared under the direction and in the presence of Herminio Mendez MD.   Electronically signed: Baldemar Hernandez, 25 @ 07:18    I, Herminio Mendez, personally performed the services described in this documentation. All medical record entries made by the scribe were at my direction and in my presence. I have reviewed the chart and agree that the record reflects my personal performance and is accurate and complete  Electronically signed: Herminio Mendez MD.

## 2025-02-13 NOTE — PROGRESS NOTE ADULT - SUBJECTIVE AND OBJECTIVE BOX
MR#[id number]  PATIENT NAME:RAAD CANO    DATE OF SERVICE: 02-13-25 @ 0720  Patient was seen and examined by Solo Quick MD on    02-13-25 @ 0730  Interim events noted.Consultant notes ,Labs,Telemetry reviewed by me       HOSPITAL COURSE: HPI:  54M with PMH of HTN, HLD, ESRD on HD MWF via LUE AVF, ascites (requiring repeat paracenteses q4-6wks), NICM with moderate LV dysfunction w/ EF 35-40%(2023) and CAD s/p atherectomy + SALLIE to D1(4/11/2024) presents to [facility] transferred from [facility]. Patient initially presented to [facility] ED with shortness of breath. Of note he was recently admitted from 09/27-12/02 for acute cholecystitis s/p cholecystectomy. In [facility] ED, pt was hypoxic to 86% on RA, trop 1.7K, EKG no new changes, CXR fluid overload. Admitted for AHRF 2/2 fluid overload. Pt received HD on 12/18, 12/19, 12/20 and 12/22. DAPT was resumed, TTE showed improvement in LVEF to 40-45%. Stress test was abnormal with 1mm inferior STD, reversible ischemia in the inferior wall and fixed defects in the lateral, anterior and apical walls with post stress EF of 24%. Pt was then transferred to [facility] for cardiac cath which showed pLAD 70% ISR, mLCx 70%, D1 severe dz. Patient now transferred to [facility] CTS Dr. Rivers for CABG eval. Patient denies any current SOB or chest pain on admission. Otherwise denies headaches, abdominal pain, urinary or bowel changes, fevers, chills, N/V/D, sick contacts.  (24 Dec 2024 05:07)      INTERIM EVENTS:Patient seen at bedside ,interim events noted.      PMH -reviewed admission note, no change since admission  HEART FAILURE: Acute[ ]Chronic[ ] Systolic[ ] Diastolic[ ] Combined Systolic and Diastolic[ ]  CAD[ ] CABG[ ] PCI[ ]  DEVICES[ ] PPM[ ] ICD[ ] ILR[ ]  ATRIAL FIBRILLATION[ ] Paroxysmal[ ] Permanent[ ] CHADS2-[  ]  GHASSAN[ ] CKD1[ ] CKD2[ ] CKD3[ ] CKD4[ ] ESRD[ ]  COPD[ ] HTN[ ]   DM[ ] Type1[ ] Type 2[ ]   CVA[ ] Paresis[ ]    AMBULATION: Assisted[ ] Cane/walker[ ] Independent[ ]    MEDICATIONS  (STANDING):  albumin human  5% IVPB 250 milliLiter(s) IV Intermittent once  aMIOdarone    Tablet 200 milliGRAM(s) Oral daily  ascorbic acid 500 milliGRAM(s) Oral daily  aspirin  chewable 81 milliGRAM(s) Oral daily  atorvastatin 80 milliGRAM(s) Oral at bedtime  chlorhexidine 0.12% Liquid 15 milliLiter(s) Oral Mucosa every 12 hours  chlorhexidine 2% Cloths 1 Application(s) Topical daily  chlorhexidine 4% Liquid 1 Application(s) Topical <User Schedule>  dextrose 50% Injectable 50 milliLiter(s) IV Push every 15 minutes  epoetin ignacia (EPOGEN) Injectable 10480 Unit(s) IV Push <User Schedule>  ferrous    sulfate Liquid 300 milliGRAM(s) Enteral Tube daily  heparin  Infusion 500 Unit(s)/Hr (10 mL/Hr) IV Continuous <Continuous>  insulin lispro (ADMELOG) corrective regimen sliding scale   SubCutaneous every 6 hours  melatonin 5 milliGRAM(s) Oral at bedtime  meropenem  IVPB 500 milliGRAM(s) IV Intermittent every 24 hours  methocarbamol 500 milliGRAM(s) Oral every 8 hours  metoprolol tartrate 12.5 milliGRAM(s) Oral <User Schedule>  mirtazapine 7.5 milliGRAM(s) Oral daily  Nephro-elicia 1 Tablet(s) Oral daily  pantoprazole  Injectable 40 milliGRAM(s) IV Push daily  polyethylene glycol 3350 17 Gram(s) Oral two times a day  sodium chloride 0.65% Nasal 1 Spray(s) Both Nostrils every 12 hours  sodium chloride 0.9%. 1000 milliLiter(s) (10 mL/Hr) IV Continuous <Continuous>  traZODone 50 milliGRAM(s) Oral at bedtime    MEDICATIONS  (PRN):  acetaminophen     Tablet .. 650 milliGRAM(s) Oral every 6 hours PRN Mild Pain (1 - 3)  lidocaine/prilocaine Cream 1 Application(s) Topical daily PRN pre-HD  oxyCODONE    IR 5 milliGRAM(s) Oral every 4 hours PRN Moderate Pain (4 - 6)  oxyCODONE    IR 10 milliGRAM(s) Oral every 4 hours PRN Severe Pain (7 - 10)  sodium chloride 0.9% lock flush 10 milliLiter(s) IV Push every 1 hour PRN Pre/post blood products, medications, blood draw, and to maintain line patency            REVIEW OF SYSTEMS:  Constitutional: [ ] fever, [ ]weight loss,  [ ]fatigue [ ]weight gain  Eyes: [ ] visual changes  Respiratory: [ ]shortness of breath;  [ ] cough, [ ]wheezing, [ ]chills, [ ]hemoptysis  Cardiovascular: [ ] chest pain, [ ]palpitations, [ ]dizziness,  [ ]leg swelling[ ]orthopnea[ ]PND  Gastrointestinal: [ ] abdominal pain, [ ]nausea, [ ]vomiting,  [ ]diarrhea [ ]Constipation [ ]Melena  Genitourinary: [ ] dysuria, [ ] hematuria [ ]Vigil  Neurologic: [ ] headaches [ ] tremors[ ]weakness [ ]Paralysis Right[ ] Left[ ]  Skin: [ ] itching, [ ]burning, [ ] rashes  Endocrine: [ ] heat or cold intolerance  Musculoskeletal: [ ] joint pain or swelling; [ ] muscle, back, or extremity pain  Psychiatric: [ ] depression, [ ]anxiety, [ ]mood swings, or [ ]difficulty sleeping  Hematologic: [ ] easy bruising, [ ] bleeding gums    [ ] All remaining systems negative except as per above.   [ ]Unable to obtain.  [x] No change in ROS since admission      Vital Signs Last 24 Hrs  T(C): 37 (13 Feb 2025 12:00), Max: 37 (12 Feb 2025 20:00)  T(F): 98.6 (13 Feb 2025 12:00), Max: 98.6 (12 Feb 2025 20:00)  HR: 101 (13 Feb 2025 15:00) (71 - 112)  BP: 106/63 (13 Feb 2025 14:00) (94/50 - 143/82)  BP(mean): 76 (13 Feb 2025 14:00) (67 - 107)  RR: 15 (13 Feb 2025 15:00) (11 - 62)  SpO2: 92% (13 Feb 2025 15:00) (78% - 100%)    Parameters below as of 13 Feb 2025 12:30  Patient On (Oxygen Delivery Method): tracheostomy collar  O2 Flow (L/min): 50  O2 Concentration (%): 40  I&O's Summary    12 Feb 2025 07:01  -  13 Feb 2025 07:00  --------------------------------------------------------  IN: 2219 mL / OUT: 1100 mL / NET: 1119 mL    13 Feb 2025 07:01  -  13 Feb 2025 15:19  --------------------------------------------------------  IN: 67 mL / OUT: 0 mL / NET: 67 mL        PHYSICAL EXAM:  General: No acute distress BMI-  HEENT: EOMI, PERRL  Neck: Supple, [ ] JVD  Lungs: Equal air entry bilaterally; [ ] rales [ ] wheezing [ ] rhonchi  Heart: Regular rate and rhythm; [x ] murmur   2/6 [ x] systolic [ ] diastolic [ ] radiation[ ] rubs [ ]  gallops  Abdomen: Nontender, bowel sounds present  Extremities: No clubbing, cyanosis, [ ] edema [ ]Pulses  equal and intact  Nervous system:  Alert & Oriented X3, no focal deficits  Psychiatric: Normal affect  Skin: No rashes or lesions    LABS:  02-13    135  |  94[L]  |  50[H]  ----------------------------<  169[H]  6.0[H]   |  26  |  6.47[H]    Ca    9.3      13 Feb 2025 13:02  Phos  4.5     02-13  Mg     2.6     02-13    TPro  6.8  /  Alb  3.0[L]  /  TBili  0.4  /  DBili  x   /  AST  16  /  ALT  10  /  AlkPhos  111  02-13    Creatinine Trend: 6.47<--, 6.04<--, 5.88<--, 5.80<--, 5.62<--, 5.48<--                        7.9    14.59 )-----------( 259      ( 13 Feb 2025 00:20 )             25.2     PT/INR - ( 13 Feb 2025 05:19 )   PT: 15.6 sec;   INR: 1.37 ratio         PTT - ( 13 Feb 2025 05:19 )  PTT:72.1 sec

## 2025-02-13 NOTE — PROGRESS NOTE ADULT - SUBJECTIVE AND OBJECTIVE BOX
[facility]    Dr. Nica Styles     Office [phone] (9 am to 5 pm)  Service: [phone] (5pm to 9am)  Also Available on TEAMS      RENAL PROGRESS NOTE: DATE OF SERVICE 02-13-25 @ 11:19    Patient is a 54y old  Male who presents with a chief complaint of CAD s/p CABG (12 Feb 2025 06:27)      Patient seen and examined at bedside. No chest pain/sob    VITALS:  T(F): 98.4 (02-13-25 @ 04:00), Max: 98.6 (02-12-25 @ 12:05)  HR: 75 (02-13-25 @ 10:46)  BP: 125/78 (02-13-25 @ 08:00)  RR: 23 (02-13-25 @ 08:00)  SpO2: 96% (02-13-25 @ 10:46)  Wt(kg): --    02-12 @ 07:01  -  02-13 @ 07:00  --------------------------------------------------------  IN: 2219 mL / OUT: 1100 mL / NET: 1119 mL          PHYSICAL EXAM:  Constitutional: NAD  Neck: No JVD  Respiratory: CTAB, no wheezes, rales or rhonchi  Cardiovascular: S1, S2, RRR  Gastrointestinal: BS+, soft, NT/ND  Extremities: No peripheral edema    Hospital Medications:   MEDICATIONS  (STANDING):  albumin human  5% IVPB 250 milliLiter(s) IV Intermittent once  aMIOdarone    Tablet 200 milliGRAM(s) Oral daily  ascorbic acid 500 milliGRAM(s) Oral daily  aspirin  chewable 81 milliGRAM(s) Oral daily  atorvastatin 80 milliGRAM(s) Oral at bedtime  chlorhexidine 0.12% Liquid 15 milliLiter(s) Oral Mucosa every 12 hours  chlorhexidine 2% Cloths 1 Application(s) Topical daily  chlorhexidine 4% Liquid 1 Application(s) Topical <User Schedule>  dextrose 50% Injectable 50 milliLiter(s) IV Push every 15 minutes  epoetin ignacia (EPOGEN) Injectable 35718 Unit(s) IV Push <User Schedule>  ferrous    sulfate Liquid 300 milliGRAM(s) Enteral Tube daily  heparin  Infusion 500 Unit(s)/Hr (10 mL/Hr) IV Continuous <Continuous>  insulin lispro (ADMELOG) corrective regimen sliding scale   SubCutaneous every 6 hours  melatonin 5 milliGRAM(s) Oral at bedtime  meropenem  IVPB 500 milliGRAM(s) IV Intermittent every 24 hours  methocarbamol 500 milliGRAM(s) Oral every 8 hours  metoprolol tartrate 12.5 milliGRAM(s) Oral <User Schedule>  mirtazapine 7.5 milliGRAM(s) Oral daily  Nephro-elicia 1 Tablet(s) Oral daily  pantoprazole  Injectable 40 milliGRAM(s) IV Push daily  polyethylene glycol 3350 17 Gram(s) Oral two times a day  sodium chloride 0.65% Nasal 1 Spray(s) Both Nostrils every 12 hours  sodium chloride 0.9%. 1000 milliLiter(s) (10 mL/Hr) IV Continuous <Continuous>  traZODone 50 milliGRAM(s) Oral at bedtime      LABS:  02-13    135  |  96  |  48[H]  ----------------------------<  76  5.7[H]   |  28  |  6.04[H]    Ca    9.1      13 Feb 2025 04:05  Phos  4.5     02-13  Mg     2.6     02-13    TPro  5.9[L]  /  Alb  2.7[L]  /  TBili  0.4  /  DBili      /  AST  15  /  ALT  9[L]  /  AlkPhos  95  02-13    Creatinine Trend: 6.04 <--, 5.88 <--, 5.80 <--, 5.62 <--, 5.48 <--, 4.69 <--, 5.93 <--, 5.13 <--, 4.28 <--, 4.37 <--    Albumin: 2.7 g/dL (02-13 @ 04:05)  Phosphorus: 4.5 mg/dL (02-13 @ 04:05)  Albumin: 2.7 g/dL (02-13 @ 00:20)  Phosphorus: 4.6 mg/dL (02-13 @ 00:20)  Albumin: 3.3 g/dL (02-12 @ 20:07)                              7.9    14.59 )-----------( 259      ( 13 Feb 2025 00:20 )             25.2     Urine Studies:  Urinalysis - [02-13-25 @ 04:05]      Color  / Appearance  / SG  / pH       Gluc 76 / Ketone   / Bili  / Urobili        Blood  / Protein  / Leuk Est  / Nitrite       RBC  / WBC  / Hyaline  / Gran  / Sq Epi  / Non Sq Epi  / Bacteria       Iron 29, TIBC 143, %sat 20      [12-19-24 @ 05:00]  Ferritin 904      [12-19-24 @ 05:00]  TSH 4.75      [12-24-24 @ 06:50]        RADIOLOGY & ADDITIONAL STUDIES:

## 2025-02-13 NOTE — CONSULT NOTE ADULT - SUBJECTIVE AND OBJECTIVE BOX
Patient is a 54y old  Male who presents with a chief complaint of CAD s/p CABG (12 Feb 2025 06:27)    Admission HPI:  54M with PMH of HTN, HLD, ESRD on HD MWF via LUE AVF, ascites (requiring repeat paracenteses q4-6wks), NICM with moderate LV dysfunction w/ EF 35-40%(2023) and CAD s/p atherectomy + SALLIE to D1(4/11/2024) presents to [facility] transferred from [facility]. Patient initially presented to [facility] ED with shortness of breath. Of note he was recently admitted from 09/27-12/02 for acute cholecystitis s/p cholecystectomy. In [facility] ED, pt was hypoxic to 86% on RA, trop 1.7K, EKG no new changes, CXR fluid overload. Admitted for AHRF 2/2 fluid overload. Pt received HD on 12/18, 12/19, 12/20 and 12/22. DAPT was resumed, TTE showed improvement in LVEF to 40-45%. Stress test was abnormal with 1mm inferior STD, reversible ischemia in the inferior wall and fixed defects in the lateral, anterior and apical walls with post stress EF of 24%. Pt was then transferred to [facility] for cardiac cath which showed pLAD 70% ISR, mLCx 70%, D1 severe dz. Patient now transferred to [facility] CTS Dr. Rivers for CABG eval. Patient denies any current SOB or chest pain on admission. Otherwise denies headaches, abdominal pain, urinary or bowel changes, fevers, chills, N/V/D, sick contacts.  (24 Dec 2024 05:07)    Interval History:  Patient s/p CABG on 12/30.  Post-op with very complicated course, respiratory failure, L Lung collapse, ascites, leukocytosis.  He also developed a L foot drop - evaluated by neurology - likely peripheral nerve- using an AFO.    REVIEW OF SYSTEMS: + poor endurace, + fatigue, + L foot weakness, No chest pain, shortness of breath, nausea, vomiting or diarhea; other ROS neg     PAST MEDICAL & SURGICAL HISTORY  Type 2 diabetes mellitus    Hypertension    HLD (hyperlipidemia)    End stage renal disease    Bone spur    Anemia    Injury of right wrist    History of hyperkalemia    S/P arthroscopy of right shoulder    History of vascular access device    H/O right wrist surgery    AV fistula    H/O ventral hernia repair    S/P cholecystectomy    FUNCTIONAL HISTORY:   Lives w family in home w 5 KIRK  PTA Independent    CURRENT FUNCTIONAL STATUS:  Amb CG, Transfers, CG    FAMILY HISTORY   FH: hypertension  FH: type 2 diabetes    MEDICATIONS   acetaminophen     Tablet .. 650 milliGRAM(s) Oral every 6 hours PRN  albumin human  5% IVPB 250 milliLiter(s) IV Intermittent once  aMIOdarone    Tablet 200 milliGRAM(s) Oral daily  ascorbic acid 500 milliGRAM(s) Oral daily  aspirin  chewable 81 milliGRAM(s) Oral daily  atorvastatin 80 milliGRAM(s) Oral at bedtime  chlorhexidine 0.12% Liquid 15 milliLiter(s) Oral Mucosa every 12 hours  chlorhexidine 2% Cloths 1 Application(s) Topical daily  chlorhexidine 4% Liquid 1 Application(s) Topical <User Schedule>  dextrose 50% Injectable 50 milliLiter(s) IV Push every 15 minutes  epoetin ignacia (EPOGEN) Injectable 33299 Unit(s) IV Push <User Schedule>  ferrous    sulfate Liquid 300 milliGRAM(s) Enteral Tube daily  heparin  Infusion 500 Unit(s)/Hr IV Continuous <Continuous>  insulin lispro (ADMELOG) corrective regimen sliding scale   SubCutaneous every 6 hours  lidocaine/prilocaine Cream 1 Application(s) Topical daily PRN  melatonin 5 milliGRAM(s) Oral at bedtime  meropenem  IVPB 500 milliGRAM(s) IV Intermittent every 24 hours  methocarbamol 500 milliGRAM(s) Oral every 8 hours  metoprolol tartrate 12.5 milliGRAM(s) Oral <User Schedule>  mirtazapine 7.5 milliGRAM(s) Oral daily  Nephro-elicia 1 Tablet(s) Oral daily  oxyCODONE    IR 5 milliGRAM(s) Oral every 4 hours PRN  oxyCODONE    IR 10 milliGRAM(s) Oral every 4 hours PRN  pantoprazole  Injectable 40 milliGRAM(s) IV Push daily  polyethylene glycol 3350 17 Gram(s) Oral two times a day  sodium chloride 0.65% Nasal 1 Spray(s) Both Nostrils every 12 hours  sodium chloride 0.9% lock flush 10 milliLiter(s) IV Push every 1 hour PRN  sodium chloride 0.9%. 1000 milliLiter(s) IV Continuous <Continuous>  traZODone 50 milliGRAM(s) Oral at bedtime    ALLERGIES  No Known Allergies    VITALS  T(C): 36.9 (02-13-25 @ 04:00), Max: 37 (02-12-25 @ 12:05)  HR: 75 (02-13-25 @ 10:46) (71 - 111)  BP: 125/78 (02-13-25 @ 08:00) (94/50 - 157/80)  RR: 23 (02-13-25 @ 08:00) (11 - 62)  SpO2: 96% (02-13-25 @ 10:46) (78% - 100%)  Wt(kg): --    PHYSICAL EXAM  Constitutional - NAD, Comfortable  HEENT - + NGT, EOMI  Neck - Supple  Chest - No distress, no use of accessory muscles for respiration  Cardiovascular -Well perfused  Abdomen - BS+, Soft, NTND  Extremities - Trace edema, No calf tenderness   Neurologic Exam -                 Alert  Cooperative  Motor bl UE 4+/5, RLE 4+/5, LLE HF 4+/5, LLE KE 4/5, L ADF 0/5, L APF 4+/5  Sensation decreased at foot dorsum  Amb w AFO, impaired balance.  Psychiatric - Mood stable, Affect WNL    RECENT LABS/IMAGING  CBC Full  -  ( 13 Feb 2025 00:20 )  WBC Count : 14.59 K/uL  RBC Count : 2.56 M/uL  Hemoglobin : 7.9 g/dL  Hematocrit : 25.2 %  Platelet Count - Automated : 259 K/uL  Mean Cell Volume : 98.4 fl  Mean Cell Hemoglobin : 30.9 pg  Mean Cell Hemoglobin Concentration : 31.3 g/dL  Auto Neutrophil # : x  Auto Lymphocyte # : x  Auto Monocyte # : x  Auto Eosinophil # : x  Auto Basophil # : x  Auto Neutrophil % : x  Auto Lymphocyte % : x  Auto Monocyte % : x  Auto Eosinophil % : x  Auto Basophil % : x    02-13    135  |  96  |  48[H]  ----------------------------<  76  5.7[H]   |  28  |  6.04[H]    Ca    9.1      13 Feb 2025 04:05  Phos  4.5     02-13  Mg     2.6     02-13    TPro  5.9[L]  /  Alb  2.7[L]  /  TBili  0.4  /  DBili  x   /  AST  15  /  ALT  9[L]  /  AlkPhos  95  02-13    Urinalysis Basic - ( 13 Feb 2025 04:05 )    Color: x / Appearance: x / SG: x / pH: x  Gluc: 76 mg/dL / Ketone: x  / Bili: x / Urobili: x   Blood: x / Protein: x / Nitrite: x   Leuk Esterase: x / RBC: x / WBC x   Sq Epi: x / Non Sq Epi: x / Bacteria: x    Impression:  53 yo with functional deficits secondary to diagnosis of debility, L foot drop    Plan:  PT- ROM, Bed Mob, Transfers, Amb w AD and bracing as needed  OT- ADLs, bracing  SLP- Dysphagia eval and treat  Prec- Falls, Cardiac, Pulm  DVT Prophylaxis - On Heparin  Skin- Turn q2 h  Dispo- If cleared on stairs should be able to go home with home PT/OT and use of AFO. Otherwise will need Acute Rehab- patient requires active and ongoing therapeutic interventions of multiple disciplines and can tolerate and benefit from 3 hours of intensive therapies x 2-4wks depending on progress at rehabilitation facility. Can actively participate and benefit from  an intensive rehabilitation program. Requires supervision by a rehabilitation physician and a coordinated interdisciplinary approach to providing rehabilitation.   D/W PT    Total time taken to review relevant records and imaging results, examine patient, write note, and, when applicable, discuss case with patient, family, , resident, medical student and other medical providers:     [  ] 40 minutes (79216)  [X] 55 minutes (22741)  [  ] 75 minutes (08007)    [  ] 25 minutes (98862)  [  ] 35 minutes (39388)  [  ] 50 minutes (49657)

## 2025-02-13 NOTE — PROGRESS NOTE ADULT - SUBJECTIVE AND OBJECTIVE BOX
Patient is a 54y old  Male who presents with a chief complaint of CAD s/p CABG (2025 06:27)      Vital Signs Last 24 Hrs  T(C): 36.9 (25 @ 04:00), Max: 37 (25 @ 12:05)  T(F): 98.4 (25 @ 04:00), Max: 98.6 (25 @ 12:05)  HR: 108 (25 @ 08:00) (71 - 118)  BP: 125/78 (25 @ 08:00) (94/50 - 157/80)  RR: 23 (25 @ 08:00) (11 - 62)  SpO2: 94% (25 @ 08:00) (78% - 100%)          Mode: CPAP with PS, FiO2: 40, PEEP: 5, PS: 5, ITime: 1, MAP: 7, PIP: 13      25 @ 07:01  -  25 @ 07:00  --------------------------------------------------------  IN: 2219 mL / OUT: 1100 mL / NET: 1119 mL        Daily     Daily Weight in k (2025 00:00)                          7.9    14.59 )-----------( 259      ( 2025 00:20 )             25.2         135  |  96  |  48[H]  ----------------------------<  76  5.7[H]   |  28  |  6.04[H]    Ca    9.1      2025 04:05  Phos  4.5       Mg     2.6         TPro  5.9[L]  /  Alb  2.7[L]  /  TBili  0.4  /  DBili  x   /  AST  15  /  ALT  9[L]  /  AlkPhos  95            PHYSICAL EXAM  Neurology: A&Ox3, NAD, no gross deficits  CV : RRR+S1S2  Lungs: , trached   Abdomen: Soft, NT/ND, +BSx4Q  Extremities: B/L no edema, negative calf tenderness, +PP           MEDICATIONS  acetaminophen     Tablet .. 650 milliGRAM(s) Oral every 6 hours PRN  albumin human  5% IVPB 250 milliLiter(s) IV Intermittent once  aMIOdarone    Tablet 200 milliGRAM(s) Oral daily  ascorbic acid 500 milliGRAM(s) Oral daily  aspirin  chewable 81 milliGRAM(s) Oral daily  atorvastatin 80 milliGRAM(s) Oral at bedtime  chlorhexidine 0.12% Liquid 15 milliLiter(s) Oral Mucosa every 12 hours  chlorhexidine 2% Cloths 1 Application(s) Topical daily  chlorhexidine 4% Liquid 1 Application(s) Topical <User Schedule>  dextrose 50% Injectable 50 milliLiter(s) IV Push every 15 minutes  epoetin ignacia (EPOGEN) Injectable 99162 Unit(s) IV Push <User Schedule>  ferrous    sulfate Liquid 300 milliGRAM(s) Enteral Tube daily  heparin  Infusion 500 Unit(s)/Hr IV Continuous <Continuous>  insulin lispro (ADMELOG) corrective regimen sliding scale   SubCutaneous every 6 hours  lidocaine/prilocaine Cream 1 Application(s) Topical daily PRN  melatonin 5 milliGRAM(s) Oral at bedtime  meropenem  IVPB 500 milliGRAM(s) IV Intermittent every 24 hours  methocarbamol 500 milliGRAM(s) Oral every 8 hours  metoprolol tartrate 12.5 milliGRAM(s) Oral <User Schedule>  mirtazapine 7.5 milliGRAM(s) Oral daily  Nephro-elicia 1 Tablet(s) Oral daily  oxyCODONE    IR 5 milliGRAM(s) Oral every 4 hours PRN  oxyCODONE    IR 10 milliGRAM(s) Oral every 4 hours PRN  pantoprazole  Injectable 40 milliGRAM(s) IV Push daily  polyethylene glycol 3350 17 Gram(s) Oral two times a day  sodium chloride 0.65% Nasal 1 Spray(s) Both Nostrils every 12 hours  sodium chloride 0.9% lock flush 10 milliLiter(s) IV Push every 1 hour PRN  sodium chloride 0.9%. 1000 milliLiter(s) IV Continuous <Continuous>  traZODone 50 milliGRAM(s) Oral at bedtime

## 2025-02-13 NOTE — PROGRESS NOTE ADULT - ASSESSMENT
54M with PMH of HTN, HLD, ESRD on HD MWF via LUE AVF, ascites (requiring repeat paracenteses q4-6wks), NICM with moderate LV dysfunction w/ EF 35-40%() and CAD s/p atherectomy + SALLIE to D1(2024) presents to [facility] transferred from [facility]. Patient initially presented to [facility] ED with shortness of breath. Of note he was recently admitted from - for acute cholecystitis s/p cholecystectomy. In [facility] ED, pt was hypoxic to 86% on RA, trop 1.7K, EKG no new changes, CXR fluid overload. Admitted for AHRF 2/2 fluid overload. Pt received HD on , ,  and . DAPT was resumed, TTE showed improvement in LVEF to 40-45%. Stress test was abnormal with 1mm inferior STD, reversible ischemia in the inferior wall and fixed defects in the lateral, anterior and apical walls with post stress EF of 24%.     Pt was then transferred to [facility] for cardiac cath which showed pLAD 70% ISR, mLCx 70%, D1 severe dz. Patient now transferred to [facility] CTS Dr. Rivers for CABG eval.# CAD s/p NSTEMI  Hx CAD s/p PCI LAD   Presented with ADHF elevated troponins  Positive NST1-Cath- pLAD 70% ISR, mLCx 70%, D1 severe dz.   TTE EF 35% Severe TRWas on Plavix-p2y12- 321 been off Plavix since -POD#1-C3L free LIMA-LAD; SVG-Diag; HSI-urbjEK-Lyezajkxx on  Sinus rhythm,Amio for AF prophylaxis  -OOB off  Sinus rhythm   -POD#3-On /pressors-EF 25-30% RV failure with transaminitis-Sinus Jjrltn13/03-POD#4-Was intubated for hypoxia-gradual hypotension on /pressors had IABP inserted this morning  -POD#5-s/p IABP insertion, sepsis/septic shock due to GNR/ECOLI HD cath placed   Bronched yesterday 1/3 - minimal secretions with rust-tinged sputum   ESBL-E Coli bacteremia on meropenam    - POD#7 Atrial Fib ,remains intubated weaning IABP 1:3,off pressors on CRRT  -IABP removed.Remains on vent-Alex 10ppm,off Levo- CVP 10 / MAP 71 / Hct 25.6% / Lactate 1.7-Atrial Fibrillation  -Intubated on Alex 10ppm, gtt, BP better-AF~~90's on Amio-had bronch still febrile Tmax 15615/-Extubated awake alert on HFNC AF,on Dobutrex-CRRT,Cultures no growth    01/10-OOB on BiPAP Sinus Rhythm on -SR/ MAP 57/ CVP 10/  Hct 26.7 / Lact 1.4  -s/p EDU/DCCV-Sinus rhythm on -SR / MAP 52 / CVP 12/ Hct 25.8 / Lact 0.7-had bronch with BAL Left lung  -Sinus rhythm on -for repeat Bronch-SR / MAP 67 / CVP 11 / Hct 27.4% / Lact 0.8  -s/p Trach on  TTE EF 55%-SR / CVP 2 / MAP 63 / Hct 25.9% / Lactate 0.9  -Awake on Trach collar off  c/o buttock pain had bronch yesterday-BAL-Sinus rhythm  -Sinus rhythm on vent at night-BP stable may need to increase hydrallazine 25 mg q8  -TTE EF 60% still needs Vent support at night Bradycardic trial of Bryce now back on   -Awake alert Sinus rhythm BP elevated  remains on ,Nocturnal vent support  resume Hydralazine 25mg q8  -Reverted to AF rvr  -s/p BRonc/BAL debridement sacral decub  -Awake noted AF RVR no further bradyarrhythmias-Started BBlockers  02/10-Persistent AF tolerating Amio BBlockers Pericardial Effusion decreased consider resuming AC  -Seen by Thoracic-? Diaphragmatic plication,AF on HD    # Acute on Chronic Systolic Heart Failure now improved  EF-35% ,severe TR  Had HD post op  GDMT post op  LVEF improved post bypass   -TTE EF 40%,trace effusion  ECG c/w pericarditis-was on colchicine   01/15-TTE EF 55%  -TTE EF 60%   -Normal LV systolic function Moderate pericardial effusion  -On TC-HF and Vent support at nights   02/10-Vent HS with T Collar trial Ambulated Remains in AF  Repeat TTE Normal LV Systolic function Small Pericardial effusion decreased from 2/3        Vascular evaluated  AVGraft /?steal causing RV failure-'' Very low suspicion of steal Sd and AVF causing current clinical state. ''   VA Duplex Hemodialysis Access, Left (25 @ 13:35) >   Arterial flow volume of 1301 mL/m, and the venous flow volume of  1492 mL/m are consistent with a normally functioning dialysis access  fistula.      # Ascites  Hx chronic ascites  Has drainage q4-6 weeks  Last drained -4600mL  ? ascites related to severe TR  Had laoitsexfsjx-Xldricc-cg growth   CT Abdomen 01/10-Large volume ascites-  US abdomen--Small to moderate volume abdominal/pelvic ascites      # ESRD  Now off CRRT transition to HD

## 2025-02-13 NOTE — PRE PROCEDURE NOTE - PRE PROCEDURE EVALUATION
Cardiac Surgery Pre-op Note:  CC: Patient is a 54y old  Male who presents with a chief complaint of CAD s/p CABG (12 Feb 2025 06:27)      Referring Physician:                                                                                             Surgeon: Dr. Abdullahi   Procedure: 2/13 Left robotic VATS Diapharagm plication     Allergies    No Known Allergies    Intolerances      HPI:  54M with PMH of HTN, HLD, ESRD on HD MWF via LUE AVF, ascites (requiring repeat paracenteses q4-6wks), NICM with moderate LV dysfunction w/ EF 35-40%(2023) and CAD s/p atherectomy + SALLIE to D1(4/11/2024) presents to [facility] transferred from [facility]. Patient initially presented to [facility] ED with shortness of breath. Of note he was recently admitted from 09/27-12/02 for acute cholecystitis s/p cholecystectomy. In [facility] ED, pt was hypoxic to 86% on RA, trop 1.7K, EKG no new changes, CXR fluid overload. Admitted for AHRF 2/2 fluid overload. Pt received HD on 12/18, 12/19, 12/20 and 12/22. DAPT was resumed, TTE showed improvement in LVEF to 40-45%. Stress test was abnormal with 1mm inferior STD, reversible ischemia in the inferior wall and fixed defects in the lateral, anterior and apical walls with post stress EF of 24%. Pt was then transferred to [facility] for cardiac cath which showed pLAD 70% ISR, mLCx 70%, D1 severe dz. Patient now transferred to [facility] CTS Dr. Rivers for CABG eval. Patient denies any current SOB or chest pain on admission. Otherwise denies headaches, abdominal pain, urinary or bowel changes, fevers, chills, N/V/D, sick contacts.  (24 Dec 2024 05:07)      PAST MEDICAL & SURGICAL HISTORY:  Type 2 diabetes mellitus      Hypertension      End stage renal disease  started HD 2/2019 T, Th, Sat via right chest permacath      Bone spur  right shoulder- hx of - sx done      Anemia      Injury of right wrist  hx of at age 14      History of hyperkalemia  before HD- K-6.2 - to repeat in am of sx, pt had HD today      S/P arthroscopy of right shoulder  2010      History of vascular access device  right chest permacath - 2/2019      H/O right wrist surgery  tendons repair - at age 14      AV fistula      H/O ventral hernia repair      S/P cholecystectomy          MEDICATIONS  (STANDING):  albumin human  5% IVPB 250 milliLiter(s) IV Intermittent once  aMIOdarone    Tablet 200 milliGRAM(s) Oral daily  ascorbic acid 500 milliGRAM(s) Oral daily  aspirin  chewable 81 milliGRAM(s) Oral daily  atorvastatin 80 milliGRAM(s) Oral at bedtime  chlorhexidine 0.12% Liquid 15 milliLiter(s) Oral Mucosa every 12 hours  chlorhexidine 2% Cloths 1 Application(s) Topical daily  chlorhexidine 4% Liquid 1 Application(s) Topical <User Schedule>  dextrose 50% Injectable 50 milliLiter(s) IV Push every 15 minutes  epoetin ignacia (EPOGEN) Injectable 05029 Unit(s) IV Push <User Schedule>  ferrous    sulfate Liquid 300 milliGRAM(s) Enteral Tube daily  heparin  Infusion 500 Unit(s)/Hr (10 mL/Hr) IV Continuous <Continuous>  insulin lispro (ADMELOG) corrective regimen sliding scale   SubCutaneous every 6 hours  melatonin 5 milliGRAM(s) Oral at bedtime  meropenem  IVPB 500 milliGRAM(s) IV Intermittent every 24 hours  methocarbamol 500 milliGRAM(s) Oral every 8 hours  metoprolol tartrate 12.5 milliGRAM(s) Oral <User Schedule>  mirtazapine 7.5 milliGRAM(s) Oral daily  Nephro-elicia 1 Tablet(s) Oral daily  pantoprazole  Injectable 40 milliGRAM(s) IV Push daily  polyethylene glycol 3350 17 Gram(s) Oral two times a day  sodium chloride 0.65% Nasal 1 Spray(s) Both Nostrils every 12 hours  sodium chloride 0.9%. 1000 milliLiter(s) (10 mL/Hr) IV Continuous <Continuous>  traZODone 50 milliGRAM(s) Oral at bedtime    MEDICATIONS  (PRN):  acetaminophen     Tablet .. 650 milliGRAM(s) Oral every 6 hours PRN Mild Pain (1 - 3)  lidocaine/prilocaine Cream 1 Application(s) Topical daily PRN pre-HD  oxyCODONE    IR 5 milliGRAM(s) Oral every 4 hours PRN Moderate Pain (4 - 6)  oxyCODONE    IR 10 milliGRAM(s) Oral every 4 hours PRN Severe Pain (7 - 10)  sodium chloride 0.9% lock flush 10 milliLiter(s) IV Push every 1 hour PRN Pre/post blood products, medications, blood draw, and to maintain line patency      Labs:                        7.9    14.59 )-----------( 259      ( 13 Feb 2025 00:20 )             25.2     02-13    x   |  x   |  20  ----------------------------<  x   x    |  x   |  x     Ca    9.3      13 Feb 2025 13:02  Phos  4.5     02-13  Mg     2.6     02-13    TPro  6.8  /  Alb  3.0[L]  /  TBili  0.4  /  DBili  x   /  AST  16  /  ALT  10  /  AlkPhos  111  02-13    PT/INR - ( 13 Feb 2025 05:19 )   PT: 15.6 sec;   INR: 1.37 ratio         PTT - ( 13 Feb 2025 05:19 )  PTT:72.1 sec    Blood Type: ABO Interpretation: AB (02-10 @ 00:11)    HGB A1C: 5.6   Prealbumin:   Pro-BNP:   Thyroid Panel:   MRSA:  / MSSA:   Urinalysis Basic - ( 13 Feb 2025 13:02 )    Color: x / Appearance: x / SG: x / pH: x  Gluc: 169 mg/dL / Ketone: x  / Bili: x / Urobili: x   Blood: x / Protein: x / Nitrite: x   Leuk Esterase: x / RBC: x / WBC x   Sq Epi: x / Non Sq Epi: x / Bacteria: x            Gen: WN/WD NAD  Neuro: AAOx3, nonfocal  Pulm: trach/ decreased in bases   CV: RRR, S1S2  Abd: Soft, NT, ND +BS  Ext: No edema, + peripheral pulses      Pt has AICD/PPM [ ] Yes  [x ] No             Brand Name:  Pre-op Beta Blocker ordered within 24 hrs of surgery (CABG ONLY)?  [ ] Yes  [ ] No  If not, Why?  Type & Cross  [ ] Yes  [ ] No  NPO after Midnight [x ] Yes  [ ] No  Pre-op ABX ordered, to be taped on chart:  [ ] Yes  [ ] No     Hibiclens/Peridex ordered [ ] Yes  [ ] No  Intraop on Hold: PRBCs, CXR, EDU [x ]   Consent obtained  [ ] Yes  [ ] No   Cardiac Surgery Pre-op Note:  CC: Patient is a 54y old  Male who presents with a chief complaint of CAD s/p CABG (12 Feb 2025 06:27)      Referring Physician:                                                                                             Surgeon: Dr. Abdullahi   Procedure: 2/13 Left robotic VATS Diapharagm plication     Allergies    No Known Allergies    Intolerances      HPI:  54M with PMH of HTN, HLD, ESRD on HD MWF via LUE AVF, ascites (requiring repeat paracenteses q4-6wks), NICM with moderate LV dysfunction w/ EF 35-40%(2023) and CAD s/p atherectomy + SALLIE to D1(4/11/2024) presents to [facility] transferred from [facility]. Patient initially presented to [facility] ED with shortness of breath. Of note he was recently admitted from 09/27-12/02 for acute cholecystitis s/p cholecystectomy. In [facility] ED, pt was hypoxic to 86% on RA, trop 1.7K, EKG no new changes, CXR fluid overload. Admitted for AHRF 2/2 fluid overload. Pt received HD on 12/18, 12/19, 12/20 and 12/22. DAPT was resumed, TTE showed improvement in LVEF to 40-45%. Stress test was abnormal with 1mm inferior STD, reversible ischemia in the inferior wall and fixed defects in the lateral, anterior and apical walls with post stress EF of 24%. Pt was then transferred to [facility] for cardiac cath which showed pLAD 70% ISR, mLCx 70%, D1 severe dz. Patient now transferred to [facility] CTS Dr. Rivers for CABG eval. Patient denies any current SOB or chest pain on admission. Otherwise denies headaches, abdominal pain, urinary or bowel changes, fevers, chills, N/V/D, sick contacts.  (24 Dec 2024 05:07)      PAST MEDICAL & SURGICAL HISTORY:  Type 2 diabetes mellitus      Hypertension      End stage renal disease  started HD 2/2019 T, Th, Sat via right chest permacath      Bone spur  right shoulder- hx of - sx done      Anemia      Injury of right wrist  hx of at age 14      History of hyperkalemia  before HD- K-6.2 - to repeat in am of sx, pt had HD today      S/P arthroscopy of right shoulder  2010      History of vascular access device  right chest permacath - 2/2019      H/O right wrist surgery  tendons repair - at age 14      AV fistula      H/O ventral hernia repair      S/P cholecystectomy          MEDICATIONS  (STANDING):  albumin human  5% IVPB 250 milliLiter(s) IV Intermittent once  aMIOdarone    Tablet 200 milliGRAM(s) Oral daily  ascorbic acid 500 milliGRAM(s) Oral daily  aspirin  chewable 81 milliGRAM(s) Oral daily  atorvastatin 80 milliGRAM(s) Oral at bedtime  chlorhexidine 0.12% Liquid 15 milliLiter(s) Oral Mucosa every 12 hours  chlorhexidine 2% Cloths 1 Application(s) Topical daily  chlorhexidine 4% Liquid 1 Application(s) Topical <User Schedule>  dextrose 50% Injectable 50 milliLiter(s) IV Push every 15 minutes  epoetin ignacia (EPOGEN) Injectable 97395 Unit(s) IV Push <User Schedule>  ferrous    sulfate Liquid 300 milliGRAM(s) Enteral Tube daily  heparin  Infusion 500 Unit(s)/Hr (10 mL/Hr) IV Continuous <Continuous>  insulin lispro (ADMELOG) corrective regimen sliding scale   SubCutaneous every 6 hours  melatonin 5 milliGRAM(s) Oral at bedtime  meropenem  IVPB 500 milliGRAM(s) IV Intermittent every 24 hours  methocarbamol 500 milliGRAM(s) Oral every 8 hours  metoprolol tartrate 12.5 milliGRAM(s) Oral <User Schedule>  mirtazapine 7.5 milliGRAM(s) Oral daily  Nephro-elicia 1 Tablet(s) Oral daily  pantoprazole  Injectable 40 milliGRAM(s) IV Push daily  polyethylene glycol 3350 17 Gram(s) Oral two times a day  sodium chloride 0.65% Nasal 1 Spray(s) Both Nostrils every 12 hours  sodium chloride 0.9%. 1000 milliLiter(s) (10 mL/Hr) IV Continuous <Continuous>  traZODone 50 milliGRAM(s) Oral at bedtime    MEDICATIONS  (PRN):  acetaminophen     Tablet .. 650 milliGRAM(s) Oral every 6 hours PRN Mild Pain (1 - 3)  lidocaine/prilocaine Cream 1 Application(s) Topical daily PRN pre-HD  oxyCODONE    IR 5 milliGRAM(s) Oral every 4 hours PRN Moderate Pain (4 - 6)  oxyCODONE    IR 10 milliGRAM(s) Oral every 4 hours PRN Severe Pain (7 - 10)  sodium chloride 0.9% lock flush 10 milliLiter(s) IV Push every 1 hour PRN Pre/post blood products, medications, blood draw, and to maintain line patency      Labs:                        7.9    14.59 )-----------( 259      ( 13 Feb 2025 00:20 )             25.2     02-13    x   |  x   |  20  ----------------------------<  x   x    |  x   |  x     Ca    9.3      13 Feb 2025 13:02  Phos  4.5     02-13  Mg     2.6     02-13    TPro  6.8  /  Alb  3.0[L]  /  TBili  0.4  /  DBili  x   /  AST  16  /  ALT  10  /  AlkPhos  111  02-13    PT/INR - ( 13 Feb 2025 05:19 )   PT: 15.6 sec;   INR: 1.37 ratio         PTT - ( 13 Feb 2025 05:19 )  PTT:72.1 sec    Blood Type: ABO Interpretation: AB (02-10 @ 00:11)    HGB A1C: 5.6   Prealbumin:   Pro-BNP:   Thyroid Panel:   MRSA:  / MSSA:   Urinalysis Basic - ( 13 Feb 2025 13:02 )    Color: x / Appearance: x / SG: x / pH: x  Gluc: 169 mg/dL / Ketone: x  / Bili: x / Urobili: x   Blood: x / Protein: x / Nitrite: x   Leuk Esterase: x / RBC: x / WBC x   Sq Epi: x / Non Sq Epi: x / Bacteria: x            Gen: WN/WD NAD  Neuro: AAOx3, nonfocal  Pulm: trach/ decreased in bases   CV: RRR, S1S2  Abd: Soft, NT, ND +BS  Ext: No edema, + peripheral pulses      Pt has AICD/PPM [ ] Yes  [x ] No             Brand Name:  Pre-op Beta Blocker ordered within 24 hrs of surgery (CABG ONLY)?  [ ] Yes  [ ] No  If not, Why?  Type & Cross  [ ] Yes  [ ] No  NPO after Midnight [x ] Yes  [ ] No  Pre-op ABX ordered, to be taped on chart:  [ ] Yes  [ ] No     Hibiclens/Peridex ordered [ ] Yes  [ ] No  Intraop on Hold: PRBCs, CXR, EDU [x ]   Consent obtained  [ ] Yes  [ ] No      cbc, coags, bmp, type and screen needed   NPO at 0400

## 2025-02-13 NOTE — PROGRESS NOTE ADULT - ASSESSMENT
54M with PMH of HTN, HLD, ESRD on HD MWF via LUE AVF, ascites (requiring repeat paracenteses q4-6wks), NICM with moderate LV dysfunction w/ EF 35-40%(2023) and CAD s/p atherectomy + SALLIE to D1(4/11/2024) presents to [facility] transferred from [facility] complaining of SOB s/p LHC showing pLAD 70% ISR, mLCx 70%, D1 severe dz.  Now s/p CABGx3 with Dr. Rivers 12/30. Course complicated by acute respiratory distress and PNA requiring multiple intubations and bronchs for left lower lobe collapse and PNA, s/p perc trach 1/16 by general surgery. Given no progress in left sided ventilation and clearance, evaluation for concern for diaphragmatic paralysis. Thoracic surgery consulted for evaluation for diaphragmatic plication. Bedside M mode ultrasound of left diaphragm attempted but patient non-compliant.

## 2025-02-14 ENCOUNTER — APPOINTMENT (OUTPATIENT)
Dept: THORACIC SURGERY | Facility: HOSPITAL | Age: 55
End: 2025-02-14

## 2025-02-14 LAB
ALBUMIN SERPL ELPH-MCNC: 2.9 G/DL — LOW (ref 3.3–5)
ALP SERPL-CCNC: 103 U/L — SIGNIFICANT CHANGE UP (ref 40–120)
ALT FLD-CCNC: 11 U/L — SIGNIFICANT CHANGE UP (ref 10–45)
ANION GAP SERPL CALC-SCNC: 14 MMOL/L — SIGNIFICANT CHANGE UP (ref 5–17)
APTT BLD: 33.6 SEC — SIGNIFICANT CHANGE UP (ref 24.5–35.6)
APTT BLD: 33.9 SEC — SIGNIFICANT CHANGE UP (ref 24.5–35.6)
APTT BLD: 34.4 SEC — SIGNIFICANT CHANGE UP (ref 24.5–35.6)
AST SERPL-CCNC: 24 U/L — SIGNIFICANT CHANGE UP (ref 10–40)
BILIRUB SERPL-MCNC: 0.4 MG/DL — SIGNIFICANT CHANGE UP (ref 0.2–1.2)
BUN SERPL-MCNC: 27 MG/DL — HIGH (ref 7–23)
BUN SERPL-MCNC: 28 MG/DL — HIGH (ref 7–23)
BUN SERPL-MCNC: 29 MG/DL — HIGH (ref 7–23)
CALCIUM SERPL-MCNC: 8.7 MG/DL — SIGNIFICANT CHANGE UP (ref 8.4–10.5)
CHLORIDE SERPL-SCNC: 97 MMOL/L — SIGNIFICANT CHANGE UP (ref 96–108)
CO2 SERPL-SCNC: 26 MMOL/L — SIGNIFICANT CHANGE UP (ref 22–31)
CREAT SERPL-MCNC: 4.44 MG/DL — HIGH (ref 0.5–1.3)
EGFR: 15 ML/MIN/1.73M2 — LOW
EGFR: 15 ML/MIN/1.73M2 — LOW
GLUCOSE SERPL-MCNC: 98 MG/DL — SIGNIFICANT CHANGE UP (ref 70–99)
HCT VFR BLD CALC: 25.5 % — LOW (ref 39–50)
HGB BLD-MCNC: 7.9 G/DL — LOW (ref 13–17)
INR BLD: 1.37 RATIO — HIGH (ref 0.85–1.16)
MAGNESIUM SERPL-MCNC: 2.3 MG/DL — SIGNIFICANT CHANGE UP (ref 1.6–2.6)
MCHC RBC-ENTMCNC: 30.5 PG — SIGNIFICANT CHANGE UP (ref 27–34)
MCHC RBC-ENTMCNC: 31 G/DL — LOW (ref 32–36)
MCV RBC AUTO: 98.5 FL — SIGNIFICANT CHANGE UP (ref 80–100)
NRBC BLD AUTO-RTO: 0 /100 WBCS — SIGNIFICANT CHANGE UP (ref 0–0)
PHOSPHATE SERPL-MCNC: 3.7 MG/DL — SIGNIFICANT CHANGE UP (ref 2.5–4.5)
PLATELET # BLD AUTO: 261 K/UL — SIGNIFICANT CHANGE UP (ref 150–400)
POTASSIUM SERPL-MCNC: 5.3 MMOL/L — SIGNIFICANT CHANGE UP (ref 3.5–5.3)
POTASSIUM SERPL-MCNC: 5.4 MMOL/L — HIGH (ref 3.5–5.3)
POTASSIUM SERPL-SCNC: 5.3 MMOL/L — SIGNIFICANT CHANGE UP (ref 3.5–5.3)
POTASSIUM SERPL-SCNC: 5.4 MMOL/L — HIGH (ref 3.5–5.3)
PROT SERPL-MCNC: 6.5 G/DL — SIGNIFICANT CHANGE UP (ref 6–8.3)
RBC # BLD: 2.59 M/UL — LOW (ref 4.2–5.8)
RBC # FLD: 22 % — HIGH (ref 10.3–14.5)
SODIUM SERPL-SCNC: 137 MMOL/L — SIGNIFICANT CHANGE UP (ref 135–145)
WBC # BLD: 13.34 K/UL — HIGH (ref 3.8–10.5)
WBC # FLD AUTO: 13.34 K/UL — HIGH (ref 3.8–10.5)

## 2025-02-14 PROCEDURE — 99232 SBSQ HOSP IP/OBS MODERATE 35: CPT | Mod: GC

## 2025-02-14 PROCEDURE — 71045 X-RAY EXAM CHEST 1 VIEW: CPT | Mod: 26

## 2025-02-14 PROCEDURE — G0545: CPT

## 2025-02-14 PROCEDURE — 93990 DOPPLER FLOW TESTING: CPT | Mod: 26

## 2025-02-14 PROCEDURE — 99291 CRITICAL CARE FIRST HOUR: CPT

## 2025-02-14 PROCEDURE — 74018 RADEX ABDOMEN 1 VIEW: CPT | Mod: 26

## 2025-02-14 RX ORDER — MIRTAZAPINE 30 MG/1
7.5 TABLET, FILM COATED ORAL AT BEDTIME
Refills: 0 | Status: DISCONTINUED | OUTPATIENT
Start: 2025-02-14 | End: 2025-03-19

## 2025-02-14 RX ORDER — HEPARIN SODIUM 1000 [USP'U]/ML
500 INJECTION INTRAVENOUS; SUBCUTANEOUS
Qty: 25000 | Refills: 0 | Status: DISCONTINUED | OUTPATIENT
Start: 2025-02-14 | End: 2025-02-25

## 2025-02-14 RX ORDER — ONDANSETRON HCL/PF 4 MG/2 ML
4 VIAL (ML) INJECTION ONCE
Refills: 0 | Status: COMPLETED | OUTPATIENT
Start: 2025-02-14 | End: 2025-02-14

## 2025-02-14 RX ORDER — METOCLOPRAMIDE HCL 10 MG
10 TABLET ORAL ONCE
Refills: 0 | Status: COMPLETED | OUTPATIENT
Start: 2025-02-14 | End: 2025-02-14

## 2025-02-14 RX ORDER — SODIUM ZIRCONIUM CYCLOSILICATE 5 G/5G
10 POWDER, FOR SUSPENSION ORAL
Refills: 0 | Status: DISCONTINUED | OUTPATIENT
Start: 2025-02-14 | End: 2025-03-19

## 2025-02-14 RX ORDER — SODIUM ZIRCONIUM CYCLOSILICATE 5 G/5G
10 POWDER, FOR SUSPENSION ORAL ONCE
Refills: 0 | Status: COMPLETED | OUTPATIENT
Start: 2025-02-14 | End: 2025-02-14

## 2025-02-14 RX ADMIN — Medication 15 MILLILITER(S): at 18:20

## 2025-02-14 RX ADMIN — Medication 40 MILLIGRAM(S): at 12:00

## 2025-02-14 RX ADMIN — Medication 4 MILLIGRAM(S): at 08:28

## 2025-02-14 RX ADMIN — Medication 300 MILLIGRAM(S): at 13:40

## 2025-02-14 RX ADMIN — Medication 50 MILLIGRAM(S): at 21:25

## 2025-02-14 RX ADMIN — Medication 15 MILLILITER(S): at 05:20

## 2025-02-14 RX ADMIN — Medication 10 MILLIGRAM(S): at 09:00

## 2025-02-14 RX ADMIN — METOPROLOL SUCCINATE 12.5 MILLIGRAM(S): 50 TABLET, EXTENDED RELEASE ORAL at 21:26

## 2025-02-14 RX ADMIN — METHOCARBAMOL 500 MILLIGRAM(S): 500 TABLET, FILM COATED ORAL at 13:33

## 2025-02-14 RX ADMIN — Medication 1 APPLICATION(S): at 21:26

## 2025-02-14 RX ADMIN — MIRTAZAPINE 7.5 MILLIGRAM(S): 30 TABLET, FILM COATED ORAL at 21:25

## 2025-02-14 RX ADMIN — OXYCODONE HYDROCHLORIDE 10 MILLIGRAM(S): 30 TABLET ORAL at 12:00

## 2025-02-14 RX ADMIN — METHOCARBAMOL 500 MILLIGRAM(S): 500 TABLET, FILM COATED ORAL at 05:19

## 2025-02-14 RX ADMIN — METHOCARBAMOL 500 MILLIGRAM(S): 500 TABLET, FILM COATED ORAL at 21:25

## 2025-02-14 RX ADMIN — Medication 1 TABLET(S): at 13:40

## 2025-02-14 RX ADMIN — OXYCODONE HYDROCHLORIDE 10 MILLIGRAM(S): 30 TABLET ORAL at 22:29

## 2025-02-14 RX ADMIN — AMIODARONE HYDROCHLORIDE 200 MILLIGRAM(S): 50 INJECTION, SOLUTION INTRAVENOUS at 05:19

## 2025-02-14 RX ADMIN — EPOETIN ALFA 10000 UNIT(S): 10000 SOLUTION INTRAVENOUS; SUBCUTANEOUS at 13:17

## 2025-02-14 RX ADMIN — Medication 500 MILLIGRAM(S): at 13:39

## 2025-02-14 RX ADMIN — Medication 81 MILLIGRAM(S): at 13:39

## 2025-02-14 RX ADMIN — SODIUM ZIRCONIUM CYCLOSILICATE 10 GRAM(S): 5 POWDER, FOR SUSPENSION ORAL at 03:55

## 2025-02-14 RX ADMIN — Medication 5 MILLIGRAM(S): at 21:25

## 2025-02-14 RX ADMIN — MEROPENEM 100 MILLIGRAM(S): 1 INJECTION INTRAVENOUS at 16:16

## 2025-02-14 RX ADMIN — Medication 1 SPRAY(S): at 05:21

## 2025-02-14 RX ADMIN — METOPROLOL SUCCINATE 12.5 MILLIGRAM(S): 50 TABLET, EXTENDED RELEASE ORAL at 09:42

## 2025-02-14 RX ADMIN — ATORVASTATIN CALCIUM 80 MILLIGRAM(S): 80 TABLET, FILM COATED ORAL at 21:25

## 2025-02-14 RX ADMIN — OXYCODONE HYDROCHLORIDE 10 MILLIGRAM(S): 30 TABLET ORAL at 12:30

## 2025-02-14 NOTE — PROGRESS NOTE ADULT - ASSESSMENT
54M with PMH of HTN, HLD, ESRD on HD MWF via LUE AVF, ascites (requiring repeat paracenteses q4-6wks), NICM with moderate LV dysfunction w/ EF 35-40%() and CAD s/p atherectomy + SALLIE to D1(2024) presents to [facility] transferred from [facility]. Patient initially presented to [facility] ED with shortness of breath. Of note he was recently admitted from - for acute cholecystitis s/p cholecystectomy. In [facility] ED, pt was hypoxic to 86% on RA, trop 1.7K, EKG no new changes, CXR fluid overload. Admitted for AHRF 2/2 fluid overload. Pt received HD on , ,  and . DAPT was resumed, TTE showed improvement in LVEF to 40-45%. Stress test was abnormal with 1mm inferior STD, reversible ischemia in the inferior wall and fixed defects in the lateral, anterior and apical walls with post stress EF of 24%.     Pt was then transferred to [facility] for cardiac cath which showed pLAD 70% ISR, mLCx 70%, D1 severe dz. Patient now transferred to [facility] CTS Dr. Rivers for CABG eval.# CAD s/p NSTEMI  Hx CAD s/p PCI LAD   Presented with ADHF elevated troponins  Positive NST1-Cath- pLAD 70% ISR, mLCx 70%, D1 severe dz.   TTE EF 35% Severe TRWas on Plavix-p2y12- 321 been off Plavix since -POD#1-C3L free LIMA-LAD; SVG-Diag; NLJ-ubcgVO-Hilcgluai on  Sinus rhythm,Amio for AF prophylaxis  -OOB off  Sinus rhythm   -POD#3-On /pressors-EF 25-30% RV failure with transaminitis-Sinus Dxwmcs05/03-POD#4-Was intubated for hypoxia-gradual hypotension on /pressors had IABP inserted this morning  -POD#5-s/p IABP insertion, sepsis/septic shock due to GNR/ECOLI HD cath placed   Bronched yesterday 1/3 - minimal secretions with rust-tinged sputum   ESBL-E Coli bacteremia on meropenam    - POD#7 Atrial Fib ,remains intubated weaning IABP 1:3,off pressors on CRRT  -IABP removed.Remains on vent-Alex 10ppm,off Levo- CVP 10 / MAP 71 / Hct 25.6% / Lactate 1.7-Atrial Fibrillation  -Intubated on Alex 10ppm, gtt, BP better-AF~~90's on Amio-had bronch still febrile Tmax 13038/-Extubated awake alert on HFNC AF,on Dobutrex-CRRT,Cultures no growth    01/10-OOB on BiPAP Sinus Rhythm on -SR/ MAP 57/ CVP 10/  Hct 26.7 / Lact 1.4  -s/p EDU/DCCV-Sinus rhythm on -SR / MAP 52 / CVP 12/ Hct 25.8 / Lact 0.7-had bronch with BAL Left lung  -Sinus rhythm on -for repeat Bronch-SR / MAP 67 / CVP 11 / Hct 27.4% / Lact 0.8  -s/p Trach on  TTE EF 55%-SR / CVP 2 / MAP 63 / Hct 25.9% / Lactate 0.9  -Awake on Trach collar off  c/o buttock pain had bronch yesterday-BAL-Sinus rhythm  -Sinus rhythm on vent at night-BP stable may need to increase hydrallazine 25 mg q8  -TTE EF 60% still needs Vent support at night Bradycardic trial of Bryce now back on   -Awake alert Sinus rhythm BP elevated  remains on ,Nocturnal vent support  resume Hydralazine 25mg q8  -Reverted to AF rvr  -s/p BRonc/BAL debridement sacral decub  -Awake noted AF RVR no further bradyarrhythmias-Started BBlockers  02/10-Persistent AF tolerating Amio BBlockers Pericardial Effusion decreased consider resuming AC  -Seen by Thoracic-? Diaphragmatic plication,AF on HD  -Sniff test +      # Acute on Chronic Systolic Heart Failure now improved  EF-35% ,severe TR  Had HD post op  GDMT post op  LVEF improved post bypass   -TTE EF 40%,trace effusion  ECG c/w pericarditis-was on colchicine   01/15-TTE EF 55%  -TTE EF 60%   -Normal LV systolic function Moderate pericardial effusion  -On TC-HF and Vent support at nights   02/10-Vent HS with T Collar trial Ambulated Remains in AF  Repeat TTE Normal LV Systolic function Small Pericardial effusion decreased from 2/3        Vascular evaluated  AVGraft /?steal causing RV failure-'' Very low suspicion of steal Sd and AVF causing current clinical state. ''   VA Duplex Hemodialysis Access, Left (25 @ 13:35) >   Arterial flow volume of 1301 mL/m, and the venous flow volume of  1492 mL/m are consistent with a normally functioning dialysis access  fistula.      # Ascites  Hx chronic ascites  Has drainage q4-6 weeks  Last drained -4600mL  ? ascites related to severe TR  Had nsxazwdayrpu-Nvgtugf-ui growth   CT Abdomen 01/10-Large volume ascites-  US abdomen--Small to moderate volume abdominal/pelvic ascites      # ESRD  Now off CRRT transition to HD          Evelia Gaston(Attending)

## 2025-02-14 NOTE — PROGRESS NOTE ADULT - ASSESSMENT
54M with PMH of HTN, HLD, ESRD on HD MWF via LUE AVF, ascites (requiring repeat paracenteses q4-6wks), NICM with moderate LV dysfunction w/ EF 35-40%(2023) and CAD s/p atherectomy + SALLIE to D1(4/11/2024) presents to [facility] transferred from [facility] for CABG eval  Nephro on [facility] for HD    ESRD on HD   Regular schedule MWF   Access LUE AVF  Center [facility]  Nephrologist Dr. Bailey  S/p HD on 01/29, 2/3 and 02/05  S/p HD on 02/07   s/p hd 2/10 uf 3L  S/p PUF on -2/11 and HD on 02/12  S/p HD on 02/13 for Hyperkalemia   HD today will extend session to 3.5 hours for Hyperkalemia, obtain recirculation study  FU AVG given persistent Hyperkalemia, if abnormal will need vascular eval  Keep MAP>65  Renal Diet  Consent in physical chart    Hyper/Hypokalemia  Monitor K    HTN  bp acceptable  monitor bp     CKD MBD  Can send a PTH  Ca and Phos daily    Anemia  CRISTIANE with HD  Transfuse if hgb <7    CAD with multivessel disease  s/p CABG 12/30  CTICU following    Hypoxic Resp failure requiring Trach  Per surgery  S/p bronchoscopy, pulm following

## 2025-02-14 NOTE — PROGRESS NOTE ADULT - ASSESSMENT
54M with HTN, HLD, ESRD on HD MWF via LUE AVF, ascites (requiring repeat paracenteses q4-6wks), NICM with moderate LV dysfunction w/ EF 35-40%() and CAD s/p atherectomy + SALLIE to D1 (2024) presents to [facility] 2024 as transfer from [facility] (via [facility]) where he presented  with SOB.   In [facility] ED, pt was hypoxic to 86% on RA, trop 1.7K, EKG no new changes, CXR fluid overload. Admitted for AHRF 2/2 fluid overload. Pt received HD on , ,  and . DAPT was resumed, TTE showed improvement in LVEF to 40-45%. Stress test was abnormal with 1mm inferior STD, reversible ischemia in the inferior wall and fixed defects in the lateral, anterior and apical walls with post stress EF of 24%. Pt was then transferred to [facility] for cardiac cath which showed pLAD 70% ISR, mLCx 70%, D1 severe dz. Patient now transferred to [facility] 2024 for CABG.       - underwent  C3L free LIMA-LAD; SVG-Diag; SVG-leftPL   - extubated   - hypotension and ECHO showing Systolic HF/depressed BIV Function, currently supported with /pressors.  Labs this evening showing transaminitis   - hypotensive, febrile and reintubated  1/3 - cultures (+) E coli +ESBL bacteremia   - underwent tracheostomy   - left lung collapse s/p bronchoscopy   - seen by plastic surgery / colorectal for sacral wound, no surgical intervention, no evidence of fistula, BC sent  - c/o right arm  pain, started on acyclovir for positive HSV PCR     doing a little better tissue culture with serratia; bronch also growing some yeast and VREC   back with nodule with some discharge    sacral decubitus less tender, some bloody secretions from trach  - still with drainage from back lesion  s/p I & D of lesion and sacrum   noted left foot numness , foot drop   2/10 - completed abs for back- dried up  local care for decubitus. thoracic surgery to assess left diaphragm    jump in WBC   WBC slightly less    Recommendations  - completed linezolid course  -completed meropenem  completed abs for back   - surgical f/u appreciated   - check cultures from bronch  neuro input appreciated  - local care for sacral decubitus, more comfortable     checked BC x 2 sets for jump in WBC,   paracentesis not c/w infection   for further suctioning and evaluation of lung  on  empiric meropenem pending cultures- day # 3, of empiric 5- 7 day course   changed lines   checked sputum culture   ? repeat echo      Marla Champion M.D. ,   please reach via teams   If no answer, or after 5PM/ weekends,  then please call  [phone]    Assessment and plan discussed with the primary team .    ID service will be covering over the 3 day  weekend. Please call for acute issues or questions. [phone]

## 2025-02-14 NOTE — PROGRESS NOTE ADULT - SUBJECTIVE AND OBJECTIVE BOX
Brief history :   55 yo M with multiple medical problems including CAD s/p PCI in 2024 s/p CABG x 3 on 24    1: Intubated for hypoxia & left lung white out s/p awake bronch with removal of copious mucopurulent secretions  : s/p IABP insertion, sepsis/septic shock due to E coli   ESBL pneumonia, collapsed Left Lung, s/p multiple bronch    tracheostomy tube placement    VRE E. Faecium Bcx   +HSV PCR BAL   tissue cx from back abscess - Serratia/MRSA  - - intermittent bradycardia/junctional episodes with hypotension  2/3: off , off theophylline. iHD 1L UF  : bronch for Left lung collapse wound vac, 2decho w/ moderate pericardial effusion - similar as before, US abd: small - moderate ascites - monitor at this time.  Wound vac placed for sacral wounds  : iHD-1L UF  : bronch today off positive pressure   : concern for left food drop   2/10 : no acute issues - CXR shows improving left sided aeration, pt subjectively reports having difficulty while lying flat  : s/p Paracentesis 3.5 L removed, leukocytosis   : Ascites fluid PMN < 250 (less likely SBP)   sniff test yesterday showed paradoxical diaphragmatic motion    : mucus plugging but able to clear airway with aggressive pulmonary toileting.       ICU Vital Signs Last 24 Hrs  T(C): 36.7 (25 @ 04:00), Max: 37 (25 @ 12:00)  T(F): 98 (25 @ 04:00), Max: 98.6 (25 @ 12:00)  HR: 104 (25 @ 06:00) (75 - 118)  BP: 147/74 (25 @ 06:00) (90/53 - 147/98)  BP(mean): 101 (25 @ 06:00) (64 - 101)  ABP: --  ABP(mean): --  RR: 15 (25 @ 06:00) (11 - 46)  SpO2: 97% (25 @ 06:00) (76% - 100%)    I&O's Summary    2025 07:01  -  2025 07:00  --------------------------------------------------------  IN: 2219 mL / OUT: 1100 mL / NET: 1119 mL    2025 07:01  -  2025 06:38  --------------------------------------------------------  IN: 462 mL / OUT: 2000 mL / NET: -1538 mL    Mode: TC  FiO2: 40                        7.9    13.34 )-----------( 261      ( 2025 00:30 )             25.5     2025 00:30    137    |  97     |  29     ----------------------------<  98     5.4     |  26     |  4.44     Ca    8.7        2025 00:30  Phos  3.7       2025 00:30  Mg     2.3       2025 00:30    TPro  6.5    /  Alb  2.9    /  TBili  0.4    /  DBili  x      /  AST  24     /  ALT  11     /  AlkPhos  103    2025 00:30    PT/INR - ( 2025 05:19 )   PT: 15.6 sec;   INR: 1.37 ratio      PTT - ( 2025 05:19 )  PTT:72.1 sec    Culture - Blood (collected 2025 13:11)  Source: .Blood BLOOD  Preliminary Report (2025 18:01):    No growth at 24 hours    Culture - Body Fluid with Gram Stain (collected 2025 18:21)  Source: Ascites Fl  Gram Stain (2025 23:44):    polymorphonuclear leukocytes seen    No organisms seen    by cytocentrifuge  Preliminary Report (2025 19:35):    No growth    Culture - Sputum (collected 2025 10:28)  Source: .Sputum Sputum  Gram Stain (2025 19:15):    Rare polymorphonuclear leukocytes per low power field    No Squamous epithelial cells per low power field    Rare Gram Negative Rods seen per oil power field  Final Report (2025 22:55):    Commensal manjit consistent with body site      MEDICATIONS  (STANDING):  aMIOdarone    Tablet 200 milliGRAM(s) Oral daily  ascorbic acid 500 milliGRAM(s) Oral daily  aspirin  chewable 81 milliGRAM(s) Oral daily  atorvastatin 80 milliGRAM(s) Oral at bedtime  chlorhexidine 0.12% Liquid 15 milliLiter(s) Oral Mucosa every 12 hours  chlorhexidine 2% Cloths 1 Application(s) Topical daily  chlorhexidine 4% Liquid 1 Application(s) Topical <User Schedule>  dextrose 50% Injectable 50 milliLiter(s) IV Push every 15 minutes  epoetin ignacia (EPOGEN) Injectable 85173 Unit(s) IV Push <User Schedule>  ferrous    sulfate Liquid 300 milliGRAM(s) Enteral Tube daily  insulin lispro (ADMELOG) corrective regimen sliding scale   SubCutaneous every 6 hours  melatonin 5 milliGRAM(s) Oral at bedtime  meropenem  IVPB 500 milliGRAM(s) IV Intermittent every 24 hours  methocarbamol 500 milliGRAM(s) Oral every 8 hours  metoprolol tartrate 12.5 milliGRAM(s) Oral <User Schedule>  mirtazapine 7.5 milliGRAM(s) Oral daily  Nephro-elicia 1 Tablet(s) Oral daily  pantoprazole  Injectable 40 milliGRAM(s) IV Push daily  polyethylene glycol 3350 17 Gram(s) Oral two times a day  sodium chloride 0.65% Nasal 1 Spray(s) Both Nostrils every 12 hours  sodium chloride 0.9%. 1000 milliLiter(s) IV Continuous <Continuous>  traZODone 50 milliGRAM(s) Oral at bedtime    Home Medications:  aspirin 81 mg oral delayed release tablet: 1 tab(s) orally once a day (23 Dec 2024 16:58)  calcium acetate 667 mg oral tablet: 1 tab(s) orally 3 times a day (23 Dec 2024 16:58)  carvedilol 12.5 mg oral tablet: 1 tab(s) orally every 12 hours (23 Dec 2024 16:56)  clopidogrel 75 mg oral tablet: 1 tab(s) orally once a day (23 Dec 2024 16:56)  furosemide 20 mg oral tablet: 1 tab(s) orally once a day (23 Dec 2024 16:58)  hydrALAZINE 25 mg oral tablet: 1 tab(s) orally 3 times a day (23 Dec 2024 16:58)  lisinopril 40 mg oral tablet: 1 tab(s) orally once a day (23 Dec 2024 16:58)  lovastatin 10 mg oral tablet: 1 tab(s) orally once a day (23 Dec 2024 16:56)  NIFEdipine 90 mg oral tablet, extended release: 1 tab(s) orally once a day (23 Dec 2024 16:58)  Emani-Elicia oral tablet: 1 tab(s) orally once a day (23 Dec 2024 16:58)  traZODone 100 mg oral tablet: 1 tab(s) orally once a day (at bedtime) (23 Dec 2024 16:56)    PHYSICAL EXAM:  Gen : no acute distress  Neck: + trach   Respiratory: decreased in the bases  Cardiovascular: S1 and S2, RRR, no M/G/R  Gastrointestinal: distended, soft   Extremities: No peripheral edema  Vascular: 2+ peripheral pulses  Neurological: A/O x 3, Left foot drop when ambulating       S/p CABG  Respiratory failure due to recurrent left lung plugging now s/p trach  vent weaning   ESRD on HD  Post op AFib   History of RV dysfunction   h/o Recurrent ascites   Acute blood loss anemia   Post operative pain   Sacral decub   Recurrent ascites likely from Chronic RV failure - s/p paracentesis   Left Diaphragm dysfunction     55 yo with history of  ESRD on HD, chronic RV dysfunction with recurrent ascites, s/p CABG on .  Post op course complicated by sepsis/pneumonia  Now Current active issue is respiratory failure due to left sided mucus plugging and poor cough and inability to manage secretion.   Sniff test  showed paradoxical diaphragm motion.     Neuro - pain management   Foot drop - likely peripheral nerve injury   Continue PT    CVS - recovering from CABG - ECHO  LV EF nl, RV remains dilated and hypokinetic (chronic)  AFIB - PO Amio for afib prophylaxis, BB for rate control - anticoagulation started but held for possible procedure today  Chronic RV Dysfunction     Pulm - vent weaning   Tolerating TC when sitting upright   Wean vent   Decrease PS   Being evaluated for possible left diaphragm plication   Needs aggressive pulm toileting     GI - TF - held for procedure today  Discussed with team drainage of ascites s/p drainage on   GI Proph/Bowel regimen     - tolerated HD   persistent hyperkalemia post HD - AV Fistula study for s/o recirculation     Heme - acute on chronic anemia (post op)  ASA / Statin  FE/EPO  Anticoagulation for PAF     ID - completed antibiotics for skin and soft tissue infection  Leukocytosis - sent sputum cultures, blood cultures    Empiric Antibiotic (Andrew)     Skin - Sacral decub - wound care, Turning Q2   Optimize nutrition          Critical care time spent    min       Care plan discussed with the ICU care team.   Patient remains critical, at risk for life threatening decompensation.    I have spent 30 minutes providing critical care management to this patient.    By signing my name below, I, Baldemar Hernandez, attest that this documentation has been prepared under the direction and in the presence of Herminio Mendez MD.   Electronically signed: Baldemar Hernandez, 25 @ 06:38    I, Herminio Mendez, personally performed the services described in this documentation. All medical record entries made by the scribe were at my direction and in my presence. I have reviewed the chart and agree that the record reflects my personal performance and is accurate and complete  Electronically signed: Herminio Mendez MD.  Brief history :   53 yo M with multiple medical problems including CAD s/p PCI in 2024 s/p CABG x 3 on 24    1: Intubated for hypoxia & left lung white out s/p awake bronch with removal of copious mucopurulent secretions  : s/p IABP insertion, sepsis/septic shock due to E coli   ESBL pneumonia, collapsed Left Lung, s/p multiple bronch    tracheostomy tube placement    VRE E. Faecium Bcx   +HSV PCR BAL   tissue cx from back abscess - Serratia/MRSA  - - intermittent bradycardia/junctional episodes with hypotension  2/3: off , off theophylline. iHD 1L UF  : bronch for Left lung collapse wound vac, 2decho w/ moderate pericardial effusion - similar as before, US abd: small - moderate ascites - monitor at this time.  Wound vac placed for sacral wounds  : iHD-1L UF  : bronch today off positive pressure   : concern for left food drop   2/10 : no acute issues - CXR shows improving left sided aeration, pt subjectively reports having difficulty while lying flat  : s/p Paracentesis 3.5 L removed, leukocytosis   : Ascites fluid PMN < 250 (less likely SBP)   sniff test yesterday showed paradoxical diaphragmatic motion    : mucus plugging but able to clear airway with aggressive pulmonary toileting.   : no vent requirement overnight, able to mobilize secretion with pulm toileting  hyperkalemia post HD - workup for possible recirculation underway     ICU Vital Signs Last 24 Hrs  T(C): 36.7 (25 @ 04:00), Max: 37 (25 @ 12:00)  T(F): 98 (25 @ 04:00), Max: 98.6 (25 @ 12:00)  HR: 104 (25 @ 06:00) (75 - 118)  BP: 147/74 (25 @ 06:00) (90/53 - 147/98)  BP(mean): 101 (25 @ 06:00) (64 - 101)  RR: 15 (25 @ 06:00) (11 - 46)  SpO2: 97% (25 @ 06:00) (76% - 100%)    I&O's Summary    2025 07:01  -  2025 07:00  --------------------------------------------------------  IN: 2219 mL / OUT: 1100 mL / NET: 1119 mL    2025 07:01  -  2025 06:38  --------------------------------------------------------  IN: 462 mL / OUT: 2000 mL / NET: -1538 mL    Mode: TC  FiO2: 40                        7.9    13.34 )-----------( 261      ( 2025 00:30 )             25.5     2025 00:30    137    |  97     |  29     ----------------------------<  98     5.4     |  26     |  4.44     Ca    8.7        2025 00:30  Phos  3.7       2025 00:30  Mg     2.3       2025 00:30    TPro  6.5    /  Alb  2.9    /  TBili  0.4    /  DBili  x      /  AST  24     /  ALT  11     /  AlkPhos  103    2025 00:30    PT/INR - ( 2025 05:19 )   PT: 15.6 sec;   INR: 1.37 ratio      PTT - ( 2025 05:19 )  PTT:72.1 sec    Culture - Blood (collected 2025 13:11)  Source: .Blood BLOOD  Preliminary Report (2025 18:01):    No growth at 24 hours    Culture - Body Fluid with Gram Stain (collected 2025 18:21)  Source: Ascites Fl  Gram Stain (2025 23:44):    polymorphonuclear leukocytes seen    No organisms seen    by cytocentrifuge  Preliminary Report (2025 19:35):    No growth    Culture - Sputum (collected 2025 10:28)  Source: .Sputum Sputum  Gram Stain (2025 19:15):    Rare polymorphonuclear leukocytes per low power field    No Squamous epithelial cells per low power field    Rare Gram Negative Rods seen per oil power field  Final Report (2025 22:55):    Commensal amnjit consistent with body site      MEDICATIONS  (STANDING):  aMIOdarone    Tablet 200 milliGRAM(s) Oral daily  ascorbic acid 500 milliGRAM(s) Oral daily  aspirin  chewable 81 milliGRAM(s) Oral daily  atorvastatin 80 milliGRAM(s) Oral at bedtime  chlorhexidine 0.12% Liquid 15 milliLiter(s) Oral Mucosa every 12 hours  chlorhexidine 2% Cloths 1 Application(s) Topical daily  chlorhexidine 4% Liquid 1 Application(s) Topical <User Schedule>  dextrose 50% Injectable 50 milliLiter(s) IV Push every 15 minutes  epoetin ignacia (EPOGEN) Injectable 00380 Unit(s) IV Push <User Schedule>  ferrous    sulfate Liquid 300 milliGRAM(s) Enteral Tube daily  insulin lispro (ADMELOG) corrective regimen sliding scale   SubCutaneous every 6 hours  melatonin 5 milliGRAM(s) Oral at bedtime  meropenem  IVPB 500 milliGRAM(s) IV Intermittent every 24 hours  methocarbamol 500 milliGRAM(s) Oral every 8 hours  metoprolol tartrate 12.5 milliGRAM(s) Oral <User Schedule>  mirtazapine 7.5 milliGRAM(s) Oral daily  Nephro-elicia 1 Tablet(s) Oral daily  pantoprazole  Injectable 40 milliGRAM(s) IV Push daily  polyethylene glycol 3350 17 Gram(s) Oral two times a day  sodium chloride 0.65% Nasal 1 Spray(s) Both Nostrils every 12 hours  sodium chloride 0.9%. 1000 milliLiter(s) IV Continuous <Continuous>  traZODone 50 milliGRAM(s) Oral at bedtime    PHYSICAL EXAM:  Gen : no acute distress  Neck: + trach   Respiratory: decreased in the bases  Cardiovascular: S1 and S2, RRR, no M/G/R  Gastrointestinal: distended, soft   Extremities: No peripheral edema  Vascular: 2+ peripheral pulses      S/p CABG  Respiratory failure due to recurrent left lung plugging now s/p trach  vent weaning   ESRD on HD  Post op AFib   History of RV dysfunction   h/o Recurrent ascites   Acute blood loss anemia   Post operative pain   Sacral decub   Recurrent ascites likely from Chronic RV failure - s/p paracentesis   Left Diaphragm dysfunction     53 yo with history of  ESRD on HD, chronic RV dysfunction with recurrent ascites, s/p CABG on .  Post op course complicated by sepsis/pneumonia  Now Current active issue is respiratory failure due to left sided mucus plugging and poor cough and inability to manage secretion.   Sniff test  showed paradoxical diaphragm motion.     Neuro - pain management   Foot drop - likely peripheral nerve injury   Continue PT    CVS - recovering from CABG - ECHO  LV EF nl, RV remains dilated and hypokinetic (chronic)  AFIB - PO Amio for afib prophylaxis, BB for rate control - anticoagulation started but held for possible procedure today  Chronic RV Dysfunction     Pulm - vent weaning   Tolerating TC when sitting upright   Wean vent   Decrease PS   Being evaluated for possible left diaphragm plication   Needs aggressive pulm toileting     GI - TF - held for procedure today  Discussed with team drainage of ascites s/p drainage on   GI Proph/Bowel regimen     - tolerated HD   persistent hyperkalemia post HD - AV Fistula study for s/o recirculation     Heme - acute on chronic anemia (post op)  ASA / Statin  FE/EPO  Anticoagulation for PAF     ID - completed antibiotics for skin and soft tissue infection  Leukocytosis - sent sputum cultures, blood cultures    Empiric Antibiotic (Andrew)     Skin - Sacral decub - wound care, Turning Q2   Optimize nutrition      Critical care time spent    min       Care plan discussed with the ICU care team.   Patient remains critical, at risk for life threatening decompensation.    I have spent 30 minutes providing critical care management to this patient.    By signing my name below, I, Baldemar Hernandez, attest that this documentation has been prepared under the direction and in the presence of Herminio Mendez MD.   Electronically signed: Baldemar Hernandez, 25 @ 06:38    I, Herminio Mendez, personally performed the services described in this documentation. All medical record entries made by the scribe were at my direction and in my presence. I have reviewed the chart and agree that the record reflects my personal performance and is accurate and complete  Electronically signed: Herminio Mendez MD.  Brief history :   53 yo M with multiple medical problems including CAD s/p PCI in 2024 s/p CABG x 3 on 24    1: Intubated for hypoxia & left lung white out s/p awake bronch with removal of copious mucopurulent secretions  : s/p IABP insertion, sepsis/septic shock due to E coli   ESBL pneumonia, collapsed Left Lung, s/p multiple bronch    tracheostomy tube placement    VRE E. Faecium Bcx   +HSV PCR BAL   tissue cx from back abscess - Serratia/MRSA  - - intermittent bradycardia/junctional episodes with hypotension  2/3: off , off theophylline. iHD 1L UF  : bronch for Left lung collapse wound vac, 2decho w/ moderate pericardial effusion - similar as before, US abd: small - moderate ascites - monitor at this time.  Wound vac placed for sacral wounds  : iHD-1L UF  : bronch today off positive pressure   : concern for left food drop   2/10 : no acute issues - CXR shows improving left sided aeration, pt subjectively reports having difficulty while lying flat  : s/p Paracentesis 3.5 L removed, leukocytosis   : Ascites fluid PMN < 250 (less likely SBP)   sniff test yesterday showed paradoxical diaphragmatic motion    : mucus plugging but able to clear airway with aggressive pulmonary toileting.   : no vent requirement overnight, able to mobilize secretion with pulm toileting  hyperkalemia post HD - workup for possible recirculation underway     ICU Vital Signs Last 24 Hrs  T(C): 36.7 (25 @ 04:00), Max: 37 (25 @ 12:00)  T(F): 98 (25 @ 04:00), Max: 98.6 (25 @ 12:00)  HR: 104 (25 @ 06:00) (75 - 118)  BP: 147/74 (25 @ 06:00) (90/53 - 147/98)  BP(mean): 101 (25 @ 06:00) (64 - 101)  RR: 15 (25 @ 06:00) (11 - 46)  SpO2: 97% (25 @ 06:00) (76% - 100%)    I&O's Summary    2025 07:01  -  2025 07:00  --------------------------------------------------------  IN: 2219 mL / OUT: 1100 mL / NET: 1119 mL    2025 07:01  -  2025 06:38  --------------------------------------------------------  IN: 462 mL / OUT: 2000 mL / NET: -1538 mL    Mode: TC  FiO2: 40                        7.9    13.34 )-----------( 261      ( 2025 00:30 )             25.5     2025 00:30    137    |  97     |  29     ----------------------------<  98     5.4     |  26     |  4.44     Ca    8.7        2025 00:30  Phos  3.7       2025 00:30  Mg     2.3       2025 00:30    TPro  6.5    /  Alb  2.9    /  TBili  0.4    /  DBili  x      /  AST  24     /  ALT  11     /  AlkPhos  103    2025 00:30    PT/INR - ( 2025 05:19 )   PT: 15.6 sec;   INR: 1.37 ratio      PTT - ( 2025 05:19 )  PTT:72.1 sec    Culture - Blood (collected 2025 13:11)  Source: .Blood BLOOD  Preliminary Report (2025 18:01):    No growth at 24 hours    Culture - Body Fluid with Gram Stain (collected 2025 18:21)  Source: Ascites Fl  Gram Stain (2025 23:44):    polymorphonuclear leukocytes seen    No organisms seen    by cytocentrifuge  Preliminary Report (2025 19:35):    No growth    Culture - Sputum (collected 2025 10:28)  Source: .Sputum Sputum  Gram Stain (2025 19:15):    Rare polymorphonuclear leukocytes per low power field    No Squamous epithelial cells per low power field    Rare Gram Negative Rods seen per oil power field  Final Report (2025 22:55):    Commensal manjit consistent with body site      MEDICATIONS  (STANDING):  aMIOdarone    Tablet 200 milliGRAM(s) Oral daily  ascorbic acid 500 milliGRAM(s) Oral daily  aspirin  chewable 81 milliGRAM(s) Oral daily  atorvastatin 80 milliGRAM(s) Oral at bedtime  chlorhexidine 0.12% Liquid 15 milliLiter(s) Oral Mucosa every 12 hours  chlorhexidine 2% Cloths 1 Application(s) Topical daily  chlorhexidine 4% Liquid 1 Application(s) Topical <User Schedule>  dextrose 50% Injectable 50 milliLiter(s) IV Push every 15 minutes  epoetin ignacia (EPOGEN) Injectable 99019 Unit(s) IV Push <User Schedule>  ferrous    sulfate Liquid 300 milliGRAM(s) Enteral Tube daily  insulin lispro (ADMELOG) corrective regimen sliding scale   SubCutaneous every 6 hours  melatonin 5 milliGRAM(s) Oral at bedtime  meropenem  IVPB 500 milliGRAM(s) IV Intermittent every 24 hours  methocarbamol 500 milliGRAM(s) Oral every 8 hours  metoprolol tartrate 12.5 milliGRAM(s) Oral <User Schedule>  mirtazapine 7.5 milliGRAM(s) Oral daily  Nephro-elicia 1 Tablet(s) Oral daily  pantoprazole  Injectable 40 milliGRAM(s) IV Push daily  polyethylene glycol 3350 17 Gram(s) Oral two times a day  sodium chloride 0.65% Nasal 1 Spray(s) Both Nostrils every 12 hours  sodium chloride 0.9%. 1000 milliLiter(s) IV Continuous <Continuous>  traZODone 50 milliGRAM(s) Oral at bedtime    PHYSICAL EXAM:  Gen : no acute distress  Neck: + trach   Respiratory: decreased in the bases  Cardiovascular: S1 and S2, RRR, no M/G/R  Gastrointestinal: distended, soft   Extremities: No peripheral edema  Vascular: 2+ peripheral pulses      S/p CABG  Respiratory failure due to recurrent left lung plugging now s/p trach  vent weaning   ESRD on HD  Post op AFib   History of RV dysfunction   h/o Recurrent ascites   Acute blood loss anemia   Post operative pain   Sacral decub   Recurrent ascites likely from Chronic RV failure - s/p paracentesis   Left Diaphragm dysfunction     53 yo with history of  ESRD on HD, chronic RV dysfunction with recurrent ascites, s/p CABG on .  Post op course complicated by sepsis/pneumonia  Now Current active issue is respiratory failure due to left sided mucus plugging and poor cough and inability to manage secretion.   Sniff test  showed paradoxical diaphragm motion.     Neuro - pain management   Foot drop - likely peripheral nerve injury   Continue PT    CVS - recovering from CABG - ECHO  LV EF nl, RV remains dilated and hypokinetic (chronic)  AFIB - PO Amio for afib prophylaxis, BB for rate control - anticoagulation started but held for possible procedure today  Chronic RV Dysfunction     Pulm - tolerated TC overnight  Able to mobilize secretions last Bronch 1 wk ago   Being evaluated for possible left diaphragm plication  - since he is clinically improving will continue current care for now   Needs aggressive pulm toileting   Empiric antibiotics for Tracheobronchitis     GI - TF   s/p Drainage of ascites on  -   GI Proph/Bowel regimen     - tolerated HD   persistent hyperkalemia post HD - AV Fistula study for s/o recirculation   plan for Lokelma on non HD days    Heme - acute on chronic anemia (post op)  ASA / Statin  FE/EPO  Anticoagulation for PAF     ID - completed antibiotics for skin and soft tissue infection  Leukocytosis - sent sputum cultures, blood cultures    Empiric Antibiotic (Andrew)     Skin - Sacral decub - wound care, Turning Q2   Optimize nutrition      Critical care time spent 40  min       Care plan discussed with the ICU care team.   Patient remains critical, at risk for life threatening decompensation.    By signing my name below, I, Baldemar Hernandez, attest that this documentation has been prepared under the direction and in the presence of Herminio Mendez MD.   Electronically signed: Baldemar Hernandez, 25 @ 06:38    I, Herminio Mendez, personally performed the services described in this documentation. All medical record entries made by the scribe were at my direction and in my presence. I have reviewed the chart and agree that the record reflects my personal performance and is accurate and complete  Electronically signed: Herminio Mendez MD.

## 2025-02-14 NOTE — PROGRESS NOTE ADULT - SUBJECTIVE AND OBJECTIVE BOX
[facility]    Dr. Nica Styles     Office [phone] (9 am to 5 pm)  Service: [phone] (5pm to 9am)  Also Available on TEAMS      RENAL PROGRESS NOTE: DATE OF SERVICE 02-14-25 @ 09:10    Patient is a 54y old  Male who presents with a chief complaint of CAD s/p CABG (12 Feb 2025 06:27)      Patient seen and examined at bedside. No chest pain/sob    VITALS:  T(F): 98.2 (02-14-25 @ 08:00), Max: 98.6 (02-13-25 @ 12:00)  HR: 93 (02-14-25 @ 08:00)  BP: 151/79 (02-14-25 @ 08:00)  RR: 25 (02-14-25 @ 08:00)  SpO2: 94% (02-14-25 @ 08:00)  Wt(kg): --    02-13 @ 07:01  -  02-14 @ 07:00  --------------------------------------------------------  IN: 462 mL / OUT: 2000 mL / NET: -1538 mL    02-14 @ 07:01  -  02-14 @ 09:10  --------------------------------------------------------  IN: 0 mL / OUT: 0 mL / NET: 0 mL          PHYSICAL EXAM:  Constitutional: NAD  Neck: No JVD  Respiratory: CTAB, no wheezes, rales or rhonchi  Cardiovascular: S1, S2, RRR  Gastrointestinal: BS+, soft, NT/ND  Extremities: No peripheral edema    Hospital Medications:   MEDICATIONS  (STANDING):  aMIOdarone    Tablet 200 milliGRAM(s) Oral daily  ascorbic acid 500 milliGRAM(s) Oral daily  aspirin  chewable 81 milliGRAM(s) Oral daily  atorvastatin 80 milliGRAM(s) Oral at bedtime  chlorhexidine 0.12% Liquid 15 milliLiter(s) Oral Mucosa every 12 hours  chlorhexidine 2% Cloths 1 Application(s) Topical daily  chlorhexidine 4% Liquid 1 Application(s) Topical <User Schedule>  dextrose 50% Injectable 50 milliLiter(s) IV Push every 15 minutes  epoetin ignacia (EPOGEN) Injectable 49527 Unit(s) IV Push <User Schedule>  ferrous    sulfate Liquid 300 milliGRAM(s) Enteral Tube daily  insulin lispro (ADMELOG) corrective regimen sliding scale   SubCutaneous every 6 hours  melatonin 5 milliGRAM(s) Oral at bedtime  meropenem  IVPB 500 milliGRAM(s) IV Intermittent every 24 hours  methocarbamol 500 milliGRAM(s) Oral every 8 hours  metoclopramide Injectable 10 milliGRAM(s) IV Push once  metoprolol tartrate 12.5 milliGRAM(s) Oral <User Schedule>  mirtazapine 7.5 milliGRAM(s) Oral daily  Nephro-elicia 1 Tablet(s) Oral daily  pantoprazole  Injectable 40 milliGRAM(s) IV Push daily  polyethylene glycol 3350 17 Gram(s) Oral two times a day  sodium chloride 0.65% Nasal 1 Spray(s) Both Nostrils every 12 hours  sodium chloride 0.9%. 1000 milliLiter(s) (10 mL/Hr) IV Continuous <Continuous>  sodium zirconium cyclosilicate 10 Gram(s) Oral <User Schedule>  traZODone 50 milliGRAM(s) Oral at bedtime      LABS:  02-14    x   |  x   |  x   ----------------------------<  x   5.3   |  x   |  x     Ca    8.7      14 Feb 2025 00:30  Phos  3.7     02-14  Mg     2.3     02-14    TPro  6.5  /  Alb  2.9[L]  /  TBili  0.4  /  DBili      /  AST  24  /  ALT  11  /  AlkPhos  103  02-14    Creatinine Trend: 4.44 <--, 4.10 <--, 6.47 <--, 6.04 <--, 5.88 <--, 5.80 <--, 5.62 <--, 5.48 <--, 4.69 <--, 5.93 <--, 5.13 <--, 4.28 <--    Albumin: 2.9 g/dL (02-14 @ 00:30)  Phosphorus: 3.7 mg/dL (02-14 @ 00:30)  Albumin: 3.3 g/dL (02-13 @ 18:35)  Albumin: 3.0 g/dL (02-13 @ 13:02)                              7.9    13.34 )-----------( 261      ( 14 Feb 2025 00:30 )             25.5     Urine Studies:  Urinalysis - [02-14-25 @ 00:30]      Color  / Appearance  / SG  / pH       Gluc 98 / Ketone   / Bili  / Urobili        Blood  / Protein  / Leuk Est  / Nitrite       RBC  / WBC  / Hyaline  / Gran  / Sq Epi  / Non Sq Epi  / Bacteria       Iron 29, TIBC 143, %sat 20      [12-19-24 @ 05:00]  Ferritin 904      [12-19-24 @ 05:00]  TSH 4.75      [12-24-24 @ 06:50]        RADIOLOGY & ADDITIONAL STUDIES:

## 2025-02-14 NOTE — PROGRESS NOTE ADULT - SUBJECTIVE AND OBJECTIVE BOX
MR#[id number]  PATIENT NAME:RAAD CANO    DATE OF SERVICE: 02-14-25 @ 10:57  Patient was seen and examined by Solo Quick MD on    02-14-25 @ 10:57 .  Interim events noted.Consultant notes ,Labs,Telemetry reviewed by me       HOSPITAL COURSE: HPI:  54M with PMH of HTN, HLD, ESRD on HD MWF via LUE AVF, ascites (requiring repeat paracenteses q4-6wks), NICM with moderate LV dysfunction w/ EF 35-40%(2023) and CAD s/p atherectomy + SALLIE to D1(4/11/2024) presents to [facility] transferred from [facility]. Patient initially presented to [facility] ED with shortness of breath. Of note he was recently admitted from 09/27-12/02 for acute cholecystitis s/p cholecystectomy. In [facility] ED, pt was hypoxic to 86% on RA, trop 1.7K, EKG no new changes, CXR fluid overload. Admitted for AHRF 2/2 fluid overload. Pt received HD on 12/18, 12/19, 12/20 and 12/22. DAPT was resumed, TTE showed improvement in LVEF to 40-45%. Stress test was abnormal with 1mm inferior STD, reversible ischemia in the inferior wall and fixed defects in the lateral, anterior and apical walls with post stress EF of 24%. Pt was then transferred to [facility] for cardiac cath which showed pLAD 70% ISR, mLCx 70%, D1 severe dz. Patient now transferred to [facility] CTS Dr. Rivers for CABG eval. Patient denies any current SOB or chest pain on admission. Otherwise denies headaches, abdominal pain, urinary or bowel changes, fevers, chills, N/V/D, sick contacts.  (24 Dec 2024 05:07)      INTERIM EVENTS:Patient seen at bedside ,interim events noted.    12/23 Cardiac cath  pLAD 70% ISR, mLCx 70%, D1 severe dz.   12/31-POD#1-C3L free LIMA-LAD; SVG-Diag; SVG-leftPL Awake OOB extubated Sinus rhythm on Dobutamine gtt  02/04-Awake alert on T collar-TC during the day and PC: 12/10//15//50% at night-interrmittent rapid AF noted  02/07-Awake had Bronch yesterday-  02/08-Seen by [facility] for LLE weakness appears peripheral neuropathy  02/10-Awake on T Collar trial is ambulsting-AF  02/12-Awake still c/o orthopnea   02/14-sniff test  showed paradoxical diaphragmatic motion        PMH -reviewed admission note, no change since admission          MEDICATIONS  (STANDING):  aMIOdarone    Tablet 200 milliGRAM(s) Oral daily  ascorbic acid 500 milliGRAM(s) Oral daily  aspirin  chewable 81 milliGRAM(s) Oral daily  atorvastatin 80 milliGRAM(s) Oral at bedtime  chlorhexidine 0.12% Liquid 15 milliLiter(s) Oral Mucosa every 12 hours  chlorhexidine 2% Cloths 1 Application(s) Topical daily  chlorhexidine 4% Liquid 1 Application(s) Topical <User Schedule>  dextrose 50% Injectable 50 milliLiter(s) IV Push every 15 minutes  epoetin ignacia (EPOGEN) Injectable 16522 Unit(s) IV Push <User Schedule>  ferrous    sulfate Liquid 300 milliGRAM(s) Enteral Tube daily  heparin  Infusion 500 Unit(s)/Hr (5 mL/Hr) IV Continuous <Continuous>  insulin lispro (ADMELOG) corrective regimen sliding scale   SubCutaneous every 6 hours  melatonin 5 milliGRAM(s) Oral at bedtime  meropenem  IVPB 500 milliGRAM(s) IV Intermittent every 24 hours  methocarbamol 500 milliGRAM(s) Oral every 8 hours  metoprolol tartrate 12.5 milliGRAM(s) Oral <User Schedule>  mirtazapine 7.5 milliGRAM(s) Oral at bedtime  Nephro-elicia 1 Tablet(s) Oral daily  pantoprazole  Injectable 40 milliGRAM(s) IV Push daily  polyethylene glycol 3350 17 Gram(s) Oral two times a day  sodium chloride 0.65% Nasal 1 Spray(s) Both Nostrils every 12 hours  sodium chloride 0.9%. 1000 milliLiter(s) (10 mL/Hr) IV Continuous <Continuous>  sodium zirconium cyclosilicate 10 Gram(s) Oral <User Schedule>  traZODone 50 milliGRAM(s) Oral at bedtime    MEDICATIONS  (PRN):  acetaminophen     Tablet .. 650 milliGRAM(s) Oral every 6 hours PRN Mild Pain (1 - 3)  lidocaine/prilocaine Cream 1 Application(s) Topical daily PRN pre-HD  oxyCODONE    IR 5 milliGRAM(s) Oral every 4 hours PRN Moderate Pain (4 - 6)  oxyCODONE    IR 10 milliGRAM(s) Oral every 4 hours PRN Severe Pain (7 - 10)  sodium chloride 0.9% lock flush 10 milliLiter(s) IV Push every 1 hour PRN Pre/post blood products, medications, blood draw, and to maintain line patency            REVIEW OF SYSTEMS:  Constitutional: [ ] fever, [ ]weight loss,  [ ]fatigue [ ]weight gain  Eyes: [ ] visual changes  Respiratory: [ ]shortness of breath;  [ ] cough, [ ]wheezing, [ ]chills, [ ]hemoptysis  Cardiovascular: [ ] chest pain, [ ]palpitations, [ ]dizziness,  [ ]leg swelling[ ]orthopnea[ ]PND  Gastrointestinal: [ ] abdominal pain, [ ]nausea, [ ]vomiting,  [ ]diarrhea [ ]Constipation [ ]Melena  Genitourinary: [ ] dysuria, [ ] hematuria [ ]Vigil  Neurologic: [ ] headaches [ ] tremors[ ]weakness [ ]Paralysis Right[ ] Left[ ]  Skin: [ ] itching, [ ]burning, [ ] rashes  Endocrine: [ ] heat or cold intolerance  Musculoskeletal: [ ] joint pain or swelling; [ ] muscle, back, or extremity pain  Psychiatric: [ ] depression, [ ]anxiety, [ ]mood swings, or [ ]difficulty sleeping  Hematologic: [ ] easy bruising, [ ] bleeding gums    [ ] All remaining systems negative except as per above.   [ ]Unable to obtain.  [x] No change in ROS since admission      Vital Signs Last 24 Hrs  T(C): 36.8 (14 Feb 2025 08:00), Max: 37 (13 Feb 2025 12:00)  T(F): 98.2 (14 Feb 2025 08:00), Max: 98.6 (13 Feb 2025 12:00)  HR: 110 (14 Feb 2025 10:00) (84 - 118)  BP: 146/88 (14 Feb 2025 10:00) (90/53 - 151/79)  BP(mean): 110 (14 Feb 2025 10:00) (64 - 110)  RR: 15 (14 Feb 2025 10:00) (10 - 46)  SpO2: 95% (14 Feb 2025 10:00) (76% - 100%)    Parameters below as of 14 Feb 2025 08:00  Patient On (Oxygen Delivery Method): [facility]   O2 Flow (L/min): 50  O2 Concentration (%): 40  I&O's Summary    13 Feb 2025 07:01  -  14 Feb 2025 07:00  --------------------------------------------------------  IN: 462 mL / OUT: 2000 mL / NET: -1538 mL    14 Feb 2025 07:01  -  14 Feb 2025 10:57  --------------------------------------------------------  IN: 0 mL / OUT: 0 mL / NET: 0 mL        PHYSICAL EXAM:  General: No acute distress BMI-  HEENT: EOMI, PERRL  Neck: Supple, [ ] JVD  Lungs: Equal air entry bilaterally; [ ] rales [ ] wheezing [ ] rhonchi  Heart: Regular rate and rhythm; [x ] murmur   2/6 [ x] systolic [ ] diastolic [ ] radiation[ ] rubs [ ]  gallops  Abdomen: Nontender, bowel sounds present  Extremities: No clubbing, cyanosis, [ ] edema [ ]Pulses  equal and intact  Nervous system:  Alert & Oriented X3, no focal deficits  Psychiatric: Normal affect  Skin: No rashes or lesions    LABS:  02-14    x   |  x   |  x   ----------------------------<  x   5.3   |  x   |  x     Ca    8.7      14 Feb 2025 00:30  Phos  3.7     02-14  Mg     2.3     02-14    TPro  6.5  /  Alb  2.9[L]  /  TBili  0.4  /  DBili  x   /  AST  24  /  ALT  11  /  AlkPhos  103  02-14    Creatinine Trend: 4.44<--, 4.10<--, 6.47<--, 6.04<--, 5.88<--, 5.80<--                        7.9    13.34 )-----------( 261      ( 14 Feb 2025 00:30 )             25.5     PT/INR - ( 14 Feb 2025 08:33 )   PT: 15.6 sec;   INR: 1.37 ratio         PTT - ( 14 Feb 2025 08:33 )  PTT:33.6 sec

## 2025-02-14 NOTE — PROGRESS NOTE ADULT - SUBJECTIVE AND OBJECTIVE BOX
Patient is a 54y old  Male who presents with a chief complaint of CAD s/p CABG (14 Feb 2025 10:57)    Being followed by ID for        Interval history:  No other acute events      ROS:  No cough,SOB,CP  No N/V/D  No abd pain  No urinary complaints  No HA  No joint or limb pain  No other complaints    PAST MEDICAL & SURGICAL HISTORY:  Type 2 diabetes mellitus      Hypertension      End stage renal disease  started HD 2/2019 T, Th, Sat via right chest permacath      Bone spur  right shoulder- hx of - sx done      Anemia      Injury of right wrist  hx of at age 14      History of hyperkalemia  before HD- K-6.2 - to repeat in am of sx, pt had HD today      S/P arthroscopy of right shoulder  2010      History of vascular access device  right chest permacath - 2/2019      H/O right wrist surgery  tendons repair - at age 14      AV fistula      H/O ventral hernia repair      S/P cholecystectomy        Allergies    No Known Allergies    Intolerances      Antimicrobials:    meropenem  IVPB 500 milliGRAM(s) IV Intermittent every 24 hours    MEDICATIONS  (STANDING):  aMIOdarone    Tablet 200 milliGRAM(s) Oral daily  ascorbic acid 500 milliGRAM(s) Oral daily  aspirin  chewable 81 milliGRAM(s) Oral daily  atorvastatin 80 milliGRAM(s) Oral at bedtime  chlorhexidine 0.12% Liquid 15 milliLiter(s) Oral Mucosa every 12 hours  chlorhexidine 2% Cloths 1 Application(s) Topical daily  chlorhexidine 4% Liquid 1 Application(s) Topical <User Schedule>  dextrose 50% Injectable 50 milliLiter(s) IV Push every 15 minutes  epoetin ignacia (EPOGEN) Injectable 33754 Unit(s) IV Push <User Schedule>  ferrous    sulfate Liquid 300 milliGRAM(s) Enteral Tube daily  heparin  Infusion 500 Unit(s)/Hr (5 mL/Hr) IV Continuous <Continuous>  insulin lispro (ADMELOG) corrective regimen sliding scale   SubCutaneous every 6 hours  melatonin 5 milliGRAM(s) Oral at bedtime  meropenem  IVPB 500 milliGRAM(s) IV Intermittent every 24 hours  methocarbamol 500 milliGRAM(s) Oral every 8 hours  metoprolol tartrate 12.5 milliGRAM(s) Oral <User Schedule>  mirtazapine 7.5 milliGRAM(s) Oral at bedtime  Nephro-elicia 1 Tablet(s) Oral daily  pantoprazole  Injectable 40 milliGRAM(s) IV Push daily  polyethylene glycol 3350 17 Gram(s) Oral two times a day  sodium chloride 0.65% Nasal 1 Spray(s) Both Nostrils every 12 hours  sodium chloride 0.9%. 1000 milliLiter(s) (10 mL/Hr) IV Continuous <Continuous>  sodium zirconium cyclosilicate 10 Gram(s) Oral <User Schedule>  traZODone 50 milliGRAM(s) Oral at bedtime      Vital Signs Last 24 Hrs  T(C): 36.8 (02-14-25 @ 08:00), Max: 37 (02-13-25 @ 16:00)  T(F): 98.2 (02-14-25 @ 08:00), Max: 98.6 (02-13-25 @ 16:00)  HR: 109 (02-14-25 @ 11:00) (84 - 118)  BP: 141/80 (02-14-25 @ 11:00) (90/53 - 151/79)  BP(mean): 104 (02-14-25 @ 11:00) (64 - 110)  RR: 18 (02-14-25 @ 11:00) (10 - 45)  SpO2: 92% (02-14-25 @ 11:00) (76% - 100%)    Physical Exam:    Constitutional well preserved,comfortable,pleasant    HEENT PERRLA EOMI,No pallor or icterus    No oral exudate or erythema    Neck supple no JVD or LN    Chest Good AE,CTA    CVS RRR S1 S2 WNl No murmur or rub or gallop    Abd soft BS normal No tenderness no masses    Ext No cyanosis clubbing or edema    IV site no erythema tenderness or discharge    Joints no swelling or LOM    CNS AAO X 3 no focal    Lab Data:                          7.9    13.34 )-----------( 261      ( 14 Feb 2025 00:30 )             25.5       02-14    x   |  x   |  x   ----------------------------<  x   5.3   |  x   |  x     Ca    8.7      14 Feb 2025 00:30  Phos  3.7     02-14  Mg     2.3     02-14    TPro  6.5  /  Alb  2.9[L]  /  TBili  0.4  /  DBili  x   /  AST  24  /  ALT  11  /  AlkPhos  103  02-14      Urinalysis Basic - ( 14 Feb 2025 00:30 )    Color: x / Appearance: x / SG: x / pH: x  Gluc: 98 mg/dL / Ketone: x  / Bili: x / Urobili: x   Blood: x / Protein: x / Nitrite: x   Leuk Esterase: x / RBC: x / WBC x   Sq Epi: x / Non Sq Epi: x / Bacteria: x        .Blood BLOOD  02-12-25   No growth at 24 hours  --  --      Ascites Fl  02-11-25   No growth  --    polymorphonuclear leukocytes seen  No organisms seen  by cytocentrifuge      .Sputum Sputum  02-11-25   Commensal manjit consistent with body site  --    Rare polymorphonuclear leukocytes per low power field  No Squamous epithelial cells per low power field  Rare Gram Negative Rods seen per oil power field                    WBC Count: 13.34 (02-14-25 @ 00:30)  WBC Count: 14.59 (02-13-25 @ 00:20)  WBC Count: 16.84 (02-12-25 @ 00:36)  WBC Count: 16.96 (02-11-25 @ 19:34)  WBC Count: 15.69 (02-11-25 @ 00:32)  WBC Count: 9.11 (02-10-25 @ 00:29)  WBC Count: 8.71 (02-09-25 @ 00:20)  WBC Count: 9.68 (02-08-25 @ 00:39)       Bilirubin Total: 0.4 mg/dL (02-14-25 @ 00:30)  Aspartate Aminotransferase (AST/SGOT): 24 U/L (02-14-25 @ 00:30)  Alanine Aminotransferase (ALT/SGPT): 11 U/L (02-14-25 @ 00:30)  Alkaline Phosphatase: 103 U/L (02-14-25 @ 00:30)  Bilirubin Total: 0.5 mg/dL (02-13-25 @ 18:35)  Aspartate Aminotransferase (AST/SGOT): 19 U/L (02-13-25 @ 18:35)  Alanine Aminotransferase (ALT/SGPT): 11 U/L (02-13-25 @ 18:35)  Alkaline Phosphatase: 117 U/L (02-13-25 @ 18:35)  Bilirubin Total: 0.4 mg/dL (02-13-25 @ 13:02)  Aspartate Aminotransferase (AST/SGOT): 16 U/L (02-13-25 @ 13:02)  Alanine Aminotransferase (ALT/SGPT): 10 U/L (02-13-25 @ 13:02)  Alkaline Phosphatase: 111 U/L (02-13-25 @ 13:02)  Bilirubin Total: 0.4 mg/dL (02-13-25 @ 04:05)  Aspartate Aminotransferase (AST/SGOT): 15 U/L (02-13-25 @ 04:05)  Alanine Aminotransferase (ALT/SGPT): 9 U/L (02-13-25 @ 04:05)  Alkaline Phosphatase: 95 U/L (02-13-25 @ 04:05)  Bilirubin Total: 0.5 mg/dL (02-13-25 @ 00:20)  Aspartate Aminotransferase (AST/SGOT): 18 U/L (02-13-25 @ 00:20)  Alanine Aminotransferase (ALT/SGPT): 9 U/L (02-13-25 @ 00:20)  Alkaline Phosphatase: 104 U/L (02-13-25 @ 00:20)  Bilirubin Total: 0.5 mg/dL (02-12-25 @ 20:07)  Aspartate Aminotransferase (AST/SGOT): 17 U/L (02-12-25 @ 20:07)  Alanine Aminotransferase (ALT/SGPT): 12 U/L (02-12-25 @ 20:07)  Alkaline Phosphatase: 117 U/L (02-12-25 @ 20:07)  Bilirubin Total: 0.4 mg/dL (02-12-25 @ 00:35)  Aspartate Aminotransferase (AST/SGOT): 14 U/L (02-12-25 @ 00:35)  Alanine Aminotransferase (ALT/SGPT): 10 U/L (02-12-25 @ 00:35)  Alkaline Phosphatase: 107 U/L (02-12-25 @ 00:35)  Bilirubin Total: 0.4 mg/dL (02-11-25 @ 00:32)  Aspartate Aminotransferase (AST/SGOT): 17 U/L (02-11-25 @ 00:32)  Alanine Aminotransferase (ALT/SGPT): 9 U/L (02-11-25 @ 00:32)  Alkaline Phosphatase: 98 U/L (02-11-25 @ 00:32)  Bilirubin Total: 0.4 mg/dL (02-10-25 @ 00:29)  Aspartate Aminotransferase (AST/SGOT): 18 U/L (02-10-25 @ 00:29)  Alanine Aminotransferase (ALT/SGPT): 11 U/L (02-10-25 @ 00:29)  Alkaline Phosphatase: 97 U/L (02-10-25 @ 00:29)         Patient is a 54y old  Male who presents with a chief complaint of CAD s/p CABG (14 Feb 2025 10:57)    Being followed by ID for        Interval history:  pt sleeping - did not awaken  receiving hemodialysis   less secretions today  No other acute events        PAST MEDICAL & SURGICAL HISTORY:  Type 2 diabetes mellitus      Hypertension      End stage renal disease  started HD 2/2019 T, Th, Sat via right chest permacath      Bone spur  right shoulder- hx of - sx done      Anemia      Injury of right wrist  hx of at age 14      History of hyperkalemia  before HD- K-6.2 - to repeat in am of sx, pt had HD today      S/P arthroscopy of right shoulder  2010      History of vascular access device  right chest permacath - 2/2019      H/O right wrist surgery  tendons repair - at age 14      AV fistula      H/O ventral hernia repair      S/P cholecystectomy        Allergies    No Known Allergies    Intolerances      Antimicrobials:    meropenem  IVPB 500 milliGRAM(s) IV Intermittent every 24 hours    MEDICATIONS  (STANDING):  aMIOdarone    Tablet 200 milliGRAM(s) Oral daily  ascorbic acid 500 milliGRAM(s) Oral daily  aspirin  chewable 81 milliGRAM(s) Oral daily  atorvastatin 80 milliGRAM(s) Oral at bedtime  chlorhexidine 0.12% Liquid 15 milliLiter(s) Oral Mucosa every 12 hours  chlorhexidine 2% Cloths 1 Application(s) Topical daily  chlorhexidine 4% Liquid 1 Application(s) Topical <User Schedule>  dextrose 50% Injectable 50 milliLiter(s) IV Push every 15 minutes  epoetin ignacia (EPOGEN) Injectable 58002 Unit(s) IV Push <User Schedule>  ferrous    sulfate Liquid 300 milliGRAM(s) Enteral Tube daily  heparin  Infusion 500 Unit(s)/Hr (5 mL/Hr) IV Continuous <Continuous>  insulin lispro (ADMELOG) corrective regimen sliding scale   SubCutaneous every 6 hours  melatonin 5 milliGRAM(s) Oral at bedtime  meropenem  IVPB 500 milliGRAM(s) IV Intermittent every 24 hours  methocarbamol 500 milliGRAM(s) Oral every 8 hours  metoprolol tartrate 12.5 milliGRAM(s) Oral <User Schedule>  mirtazapine 7.5 milliGRAM(s) Oral at bedtime  Nephro-elicia 1 Tablet(s) Oral daily  pantoprazole  Injectable 40 milliGRAM(s) IV Push daily  polyethylene glycol 3350 17 Gram(s) Oral two times a day  sodium chloride 0.65% Nasal 1 Spray(s) Both Nostrils every 12 hours  sodium chloride 0.9%. 1000 milliLiter(s) (10 mL/Hr) IV Continuous <Continuous>  sodium zirconium cyclosilicate 10 Gram(s) Oral <User Schedule>  traZODone 50 milliGRAM(s) Oral at bedtime      Vital Signs Last 24 Hrs  T(C): 36.8 (02-14-25 @ 08:00), Max: 37 (02-13-25 @ 16:00)  T(F): 98.2 (02-14-25 @ 08:00), Max: 98.6 (02-13-25 @ 16:00)  HR: 109 (02-14-25 @ 11:00) (84 - 118)  BP: 141/80 (02-14-25 @ 11:00) (90/53 - 151/79)  BP(mean): 104 (02-14-25 @ 11:00) (64 - 110)  RR: 18 (02-14-25 @ 11:00) (10 - 45)  SpO2: 92% (02-14-25 @ 11:00) (76% - 100%)    Physical Exam:    Constitutional sleepingt    Neck trach    Chest Good AE,CTA    CVS  S1 S2    Abd soft BS normal No tenderness     Ext No cyanosis clubbing or edema    IV site no erythema tenderness or discharge    Joints no swelling or LOM        Lab Data:                          7.9    13.34 )-----------( 261      ( 14 Feb 2025 00:30 )             25.5       02-14    x   |  x   |  x   ----------------------------<  x   5.3   |  x   |  x     Ca    8.7      14 Feb 2025 00:30  Phos  3.7     02-14  Mg     2.3     02-14    TPro  6.5  /  Alb  2.9[L]  /  TBili  0.4  /  DBili  x   /  AST  24  /  ALT  11  /  AlkPhos  103  02-14      .Blood BLOOD  02-12-25   No growth at 24 hours  --  --      Ascites Fl  02-11-25   No growth  --    polymorphonuclear leukocytes seen  No organisms seen  by cytocentrifuge      .Sputum Sputum  02-11-25   Commensal manjit consistent with body site  --    Rare polymorphonuclear leukocytes per low power field  No Squamous epithelial cells per low power field  Rare Gram Negative Rods seen per oil power field        WBC Count: 13.34 (02-14-25 @ 00:30)  WBC Count: 14.59 (02-13-25 @ 00:20)  WBC Count: 16.84 (02-12-25 @ 00:36)  WBC Count: 16.96 (02-11-25 @ 19:34)  WBC Count: 15.69 (02-11-25 @ 00:32)  WBC Count: 9.11 (02-10-25 @ 00:29)  WBC Count: 8.71 (02-09-25 @ 00:20)  WBC Count: 9.68 (02-08-25 @ 00:39)       Bilirubin Total: 0.4 mg/dL (02-14-25 @ 00:30)  Aspartate Aminotransferase (AST/SGOT): 24 U/L (02-14-25 @ 00:30)  Alanine Aminotransferase (ALT/SGPT): 11 U/L (02-14-25 @ 00:30)  Alkaline Phosphatase: 103 U/L (02-14-25 @ 00:30    Bilirubin Total: 0.5 mg/dL (02-13-25 @ 18:35)  Aspartate Aminotransferase (AST/SGOT): 19 U/L (02-13-25 @ 18:35)  Alanine Aminotransferase (ALT/SGPT): 11 U/L (02-13-25 @ 18:35)  Alkaline Phosphatase: 117 U/L (02-13-25 @ 18:35)        < from: Xray Abdomen 1 View PORTABLE -Urgent (Xray Abdomen 1 View PORTABLE -Urgent .) (02.14.25 @ 10:39) >  COMPARISON: No available imaging for comparison.    FINDINGS:  Enteric tube is coiled within the stomach. No kinks are visualized.   Surgical material overlies the abdomen.  Nonobstructive bowel gas pattern.  There is no evidence of intraperitoneal free air on this single supine   radiograph.  The visualized osseous structures demonstrate no acute pathology.    IMPRESSION:  Nonobstructive bowel gas pattern.    --- End of Report ---      < end of copied text >

## 2025-02-15 LAB
ALBUMIN SERPL ELPH-MCNC: 2.7 G/DL — LOW (ref 3.3–5)
ALP SERPL-CCNC: 109 U/L — SIGNIFICANT CHANGE UP (ref 40–120)
ALT FLD-CCNC: 13 U/L — SIGNIFICANT CHANGE UP (ref 10–45)
ANION GAP SERPL CALC-SCNC: 11 MMOL/L — SIGNIFICANT CHANGE UP (ref 5–17)
APTT BLD: 34.7 SEC — SIGNIFICANT CHANGE UP (ref 24.5–35.6)
APTT BLD: 34.9 SEC — SIGNIFICANT CHANGE UP (ref 24.5–35.6)
APTT BLD: 41.3 SEC — HIGH (ref 24.5–35.6)
AST SERPL-CCNC: 39 U/L — SIGNIFICANT CHANGE UP (ref 10–40)
BASOPHILS # BLD AUTO: 0.06 K/UL — SIGNIFICANT CHANGE UP (ref 0–0.2)
BASOPHILS NFR BLD AUTO: 0.6 % — SIGNIFICANT CHANGE UP (ref 0–2)
BILIRUB SERPL-MCNC: 0.4 MG/DL — SIGNIFICANT CHANGE UP (ref 0.2–1.2)
BUN SERPL-MCNC: 19 MG/DL — SIGNIFICANT CHANGE UP (ref 7–23)
BUN SERPL-MCNC: 20 MG/DL — SIGNIFICANT CHANGE UP (ref 7–23)
BUN SERPL-MCNC: 21 MG/DL — SIGNIFICANT CHANGE UP (ref 7–23)
BUN SERPL-MCNC: <4 MG/DL — LOW (ref 7–23)
CALCIUM SERPL-MCNC: 8.8 MG/DL — SIGNIFICANT CHANGE UP (ref 8.4–10.5)
CHLORIDE SERPL-SCNC: 97 MMOL/L — SIGNIFICANT CHANGE UP (ref 96–108)
CO2 SERPL-SCNC: 30 MMOL/L — SIGNIFICANT CHANGE UP (ref 22–31)
CREAT SERPL-MCNC: 3.29 MG/DL — HIGH (ref 0.5–1.3)
EGFR: 21 ML/MIN/1.73M2 — LOW
EGFR: 21 ML/MIN/1.73M2 — LOW
EOSINOPHIL # BLD AUTO: 0.77 K/UL — HIGH (ref 0–0.5)
EOSINOPHIL NFR BLD AUTO: 7.9 % — HIGH (ref 0–6)
GLUCOSE SERPL-MCNC: 163 MG/DL — HIGH (ref 70–99)
HGB BLD-MCNC: 7.9 G/DL — LOW (ref 13–17)
IMM GRANULOCYTES NFR BLD AUTO: 0.6 % — SIGNIFICANT CHANGE UP (ref 0–0.9)
LYMPHOCYTES # BLD AUTO: 0.56 K/UL — LOW (ref 1–3.3)
LYMPHOCYTES # BLD AUTO: 5.8 % — LOW (ref 13–44)
MAGNESIUM SERPL-MCNC: 2.4 MG/DL — SIGNIFICANT CHANGE UP (ref 1.6–2.6)
MCHC RBC-ENTMCNC: 30.6 PG — SIGNIFICANT CHANGE UP (ref 27–34)
MCHC RBC-ENTMCNC: 31 G/DL — LOW (ref 32–36)
MCV RBC AUTO: 98.8 FL — SIGNIFICANT CHANGE UP (ref 80–100)
MONOCYTES # BLD AUTO: 1.06 K/UL — HIGH (ref 0–0.9)
MONOCYTES NFR BLD AUTO: 10.9 % — SIGNIFICANT CHANGE UP (ref 2–14)
NEUTROPHILS NFR BLD AUTO: 74.2 % — SIGNIFICANT CHANGE UP (ref 43–77)
NRBC BLD AUTO-RTO: 0 /100 WBCS — SIGNIFICANT CHANGE UP (ref 0–0)
PHOSPHATE SERPL-MCNC: 3.3 MG/DL — SIGNIFICANT CHANGE UP (ref 2.5–4.5)
PLATELET # BLD AUTO: 352 K/UL — SIGNIFICANT CHANGE UP (ref 150–400)
POTASSIUM SERPL-SCNC: 4.8 MMOL/L — SIGNIFICANT CHANGE UP (ref 3.5–5.3)
PROT SERPL-MCNC: 6.6 G/DL — SIGNIFICANT CHANGE UP (ref 6–8.3)
RBC # BLD: 2.58 M/UL — LOW (ref 4.2–5.8)
RBC # FLD: 21.9 % — HIGH (ref 10.3–14.5)
SODIUM SERPL-SCNC: 138 MMOL/L — SIGNIFICANT CHANGE UP (ref 135–145)
WBC # BLD: 9.72 K/UL — SIGNIFICANT CHANGE UP (ref 3.8–10.5)
WBC # FLD AUTO: 9.72 K/UL — SIGNIFICANT CHANGE UP (ref 3.8–10.5)

## 2025-02-15 PROCEDURE — 71045 X-RAY EXAM CHEST 1 VIEW: CPT | Mod: 26

## 2025-02-15 PROCEDURE — 99291 CRITICAL CARE FIRST HOUR: CPT

## 2025-02-15 RX ADMIN — METHOCARBAMOL 500 MILLIGRAM(S): 500 TABLET, FILM COATED ORAL at 22:02

## 2025-02-15 RX ADMIN — OXYCODONE HYDROCHLORIDE 10 MILLIGRAM(S): 30 TABLET ORAL at 22:30

## 2025-02-15 RX ADMIN — OXYCODONE HYDROCHLORIDE 10 MILLIGRAM(S): 30 TABLET ORAL at 07:59

## 2025-02-15 RX ADMIN — Medication 500 MILLIGRAM(S): at 12:24

## 2025-02-15 RX ADMIN — METHOCARBAMOL 500 MILLIGRAM(S): 500 TABLET, FILM COATED ORAL at 06:05

## 2025-02-15 RX ADMIN — OXYCODONE HYDROCHLORIDE 10 MILLIGRAM(S): 30 TABLET ORAL at 00:00

## 2025-02-15 RX ADMIN — Medication 81 MILLIGRAM(S): at 12:24

## 2025-02-15 RX ADMIN — Medication 5 MILLIGRAM(S): at 22:03

## 2025-02-15 RX ADMIN — ATORVASTATIN CALCIUM 80 MILLIGRAM(S): 80 TABLET, FILM COATED ORAL at 22:03

## 2025-02-15 RX ADMIN — MEROPENEM 100 MILLIGRAM(S): 1 INJECTION INTRAVENOUS at 16:40

## 2025-02-15 RX ADMIN — METOPROLOL SUCCINATE 12.5 MILLIGRAM(S): 50 TABLET, EXTENDED RELEASE ORAL at 17:24

## 2025-02-15 RX ADMIN — Medication 40 MILLIGRAM(S): at 12:25

## 2025-02-15 RX ADMIN — OXYCODONE HYDROCHLORIDE 10 MILLIGRAM(S): 30 TABLET ORAL at 08:29

## 2025-02-15 RX ADMIN — Medication 1 APPLICATION(S): at 22:27

## 2025-02-15 RX ADMIN — METOPROLOL SUCCINATE 12.5 MILLIGRAM(S): 50 TABLET, EXTENDED RELEASE ORAL at 07:59

## 2025-02-15 RX ADMIN — SODIUM ZIRCONIUM CYCLOSILICATE 10 GRAM(S): 5 POWDER, FOR SUSPENSION ORAL at 10:14

## 2025-02-15 RX ADMIN — LIDOCAINE AND PRILOCAINE 1 APPLICATION(S): 25; 25 CREAM TOPICAL at 11:00

## 2025-02-15 RX ADMIN — OXYCODONE HYDROCHLORIDE 10 MILLIGRAM(S): 30 TABLET ORAL at 18:02

## 2025-02-15 RX ADMIN — Medication 1 TABLET(S): at 12:22

## 2025-02-15 RX ADMIN — OXYCODONE HYDROCHLORIDE 10 MILLIGRAM(S): 30 TABLET ORAL at 22:00

## 2025-02-15 RX ADMIN — Medication 15 MILLILITER(S): at 06:05

## 2025-02-15 RX ADMIN — HEPARIN SODIUM 12 UNIT(S)/HR: 1000 INJECTION INTRAVENOUS; SUBCUTANEOUS at 22:27

## 2025-02-15 RX ADMIN — Medication 50 MILLIGRAM(S): at 22:02

## 2025-02-15 RX ADMIN — AMIODARONE HYDROCHLORIDE 200 MILLIGRAM(S): 50 INJECTION, SOLUTION INTRAVENOUS at 06:05

## 2025-02-15 RX ADMIN — OXYCODONE HYDROCHLORIDE 10 MILLIGRAM(S): 30 TABLET ORAL at 18:32

## 2025-02-15 RX ADMIN — METHOCARBAMOL 500 MILLIGRAM(S): 500 TABLET, FILM COATED ORAL at 16:41

## 2025-02-15 RX ADMIN — Medication 300 MILLIGRAM(S): at 12:24

## 2025-02-15 RX ADMIN — INSULIN LISPRO 2: 100 INJECTION, SOLUTION INTRAVENOUS; SUBCUTANEOUS at 00:41

## 2025-02-15 NOTE — PROVIDER CONTACT NOTE (OTHER) - ACTION/TREATMENT ORDERED:
As per MYRA Noe. Wound vac is getting change during day shift. Okay for wound vac to not be seal. No new orders.

## 2025-02-15 NOTE — PROGRESS NOTE ADULT - SUBJECTIVE AND OBJECTIVE BOX
MR#[id number]  PATIENT NAME:RAAD CANO    DATE OF SERVICE: 02-15-25 @ 07:17  Patient was seen and examined by Solo Quick MD on    02-15-25 @ 07:17 .  Interim events noted.Consultant notes ,Labs,Telemetry reviewed by me       HOSPITAL COURSE: HPI:  54M with PMH of HTN, HLD, ESRD on HD MWF via LUE AVF, ascites (requiring repeat paracenteses q4-6wks), NICM with moderate LV dysfunction w/ EF 35-40%(2023) and CAD s/p atherectomy + SALLIE to D1(4/11/2024) presents to [facility] transferred from [facility]. Patient initially [facility] to [facility] ED with shortness of breath. Of note he was recently admitted from 09/27-12/02 for acute cholecystitis s/p cholecystectomy. In [facility] ED, pt was hypoxic to 86% on RA, trop 1.7K, EKG no new changes, CXR fluid overload. Admitted for AHRF 2/2 fluid overload. Pt received HD on 12/18, 12/19, 12/20 and 12/22. DAPT was resumed, TTE showed improvement in LVEF to 40-45%. Stress test was abnormal with 1mm inferior STD, reversible ischemia in the inferior wall and fixed defects in the lateral, anterior and apical walls with post stress EF of 24%. Pt was then transferred to [facility] for cardiac cath which showed pLAD 70% ISR, mLCx 70%, D1 severe dz. Patient now transferred to [facility] CTS Dr. Rivers for CABG eval. Patient denies any current SOB or chest pain on admission. Otherwise denies headaches, abdominal pain, urinary or bowel changes, fevers, chills, N/V/D, sick contacts.  (24 Dec 2024 05:07)      INTERIM EVENTS:Patient seen at bedside ,interim events noted.  12/23 Cardiac cath  pLAD 70% ISR, mLCx 70%, D1 severe dz.   12/31-POD#1-C3L free LIMA-LAD; SVG-Diag; SVG-leftPL Awake OOB extubated Sinus rhythm on Dobutamine gtt  02/04-Awake alert on T collar-TC during the day and PC: 12/10//15//50% at night-interrmittent rapid AF noted  02/07-Awake had Bronch yesterday-  02/08-Seen by [facility] for LLE weakness appears peripheral neuropathy  02/10-Awake on T Collar trial is ambulsting-AF  02/12-Awake still c/o orthopnea   02/14-sniff test  showed paradoxical diaphragmatic motion    02/15-Awake AF     PMH -reviewed admission note, no change since admission    MEDICATIONS  (STANDING):  aMIOdarone    Tablet 200 milliGRAM(s) Oral daily  ascorbic acid 500 milliGRAM(s) Oral daily  aspirin  chewable 81 milliGRAM(s) Oral daily  atorvastatin 80 milliGRAM(s) Oral at bedtime  chlorhexidine 0.12% Liquid 15 milliLiter(s) Oral Mucosa every 12 hours  chlorhexidine 2% Cloths 1 Application(s) Topical daily  chlorhexidine 4% Liquid 1 Application(s) Topical <User Schedule>  dextrose 50% Injectable 50 milliLiter(s) IV Push every 15 minutes  epoetin ignacia (EPOGEN) Injectable 43210 Unit(s) IV Push <User Schedule>  ferrous    sulfate Liquid 300 milliGRAM(s) Enteral Tube daily  heparin  Infusion 500 Unit(s)/Hr (7 mL/Hr) IV Continuous <Continuous>  insulin lispro (ADMELOG) corrective regimen sliding scale   SubCutaneous every 6 hours  melatonin 5 milliGRAM(s) Oral at bedtime  meropenem  IVPB 500 milliGRAM(s) IV Intermittent every 24 hours  methocarbamol 500 milliGRAM(s) Oral every 8 hours  metoprolol tartrate 12.5 milliGRAM(s) Oral <User Schedule>  mirtazapine 7.5 milliGRAM(s) Oral at bedtime  Nephro-elicia 1 Tablet(s) Oral daily  pantoprazole  Injectable 40 milliGRAM(s) IV Push daily  polyethylene glycol 3350 17 Gram(s) Oral two times a day  sodium chloride 0.65% Nasal 1 Spray(s) Both Nostrils every 12 hours  sodium chloride 0.9%. 1000 milliLiter(s) (10 mL/Hr) IV Continuous <Continuous>  sodium zirconium cyclosilicate 10 Gram(s) Oral <User Schedule>  traZODone 50 milliGRAM(s) Oral at bedtime    MEDICATIONS  (PRN):  acetaminophen     Tablet .. 650 milliGRAM(s) Oral every 6 hours PRN Mild Pain (1 - 3)  lidocaine/prilocaine Cream 1 Application(s) Topical daily PRN pre-HD  oxyCODONE    IR 5 milliGRAM(s) Oral every 4 hours PRN Moderate Pain (4 - 6)  oxyCODONE    IR 10 milliGRAM(s) Oral every 4 hours PRN Severe Pain (7 - 10)  sodium chloride 0.9% lock flush 10 milliLiter(s) IV Push every 1 hour PRN Pre/post blood products, medications, blood draw, and to maintain line patency            REVIEW OF SYSTEMS:  Constitutional: [ ] fever, [ ]weight loss,  [ ]fatigue [ ]weight gain  Eyes: [ ] visual changes  Respiratory: [ ]shortness of breath;  [ ] cough, [ ]wheezing, [ ]chills, [ ]hemoptysis  Cardiovascular: [ ] chest pain, [ ]palpitations, [ ]dizziness,  [ ]leg swelling[ ]orthopnea[ ]PND  Gastrointestinal: [ ] abdominal pain, [ ]nausea, [ ]vomiting,  [ ]diarrhea [ ]Constipation [ ]Melena  Genitourinary: [ ] dysuria, [ ] hematuria [ ]Vigil  Neurologic: [ ] headaches [ ] tremors[ ]weakness [ ]Paralysis Right[ ] Left[ ]  Skin: [ ] itching, [ ]burning, [ ] rashes  Endocrine: [ ] heat or cold intolerance  Musculoskeletal: [ ] joint pain or swelling; [ ] muscle, back, or extremity pain  Psychiatric: [ ] depression, [ ]anxiety, [ ]mood swings, or [ ]difficulty sleeping  Hematologic: [ ] easy bruising, [ ] bleeding gums    [ ] All remaining systems negative except as per above.   [ ]Unable to obtain.  [x] No change in ROS since admission      Vital Signs Last 24 Hrs  T(C): 36.6 (15 Feb 2025 04:00), Max: 36.8 (14 Feb 2025 08:00)  T(F): 97.8 (15 Feb 2025 04:00), Max: 98.2 (14 Feb 2025 08:00)  HR: 73 (15 Feb 2025 06:00) (60 - 117)  BP: 122/69 (15 Feb 2025 06:00) (98/52 - 151/79)  BP(mean): 91 (15 Feb 2025 06:00) (72 - 110)  RR: 20 (15 Feb 2025 06:00) (10 - 34)  SpO2: 97% (15 Feb 2025 06:00) (92% - 100%)    Parameters below as of 15 Feb 2025 04:00  Patient On (Oxygen Delivery Method): nasal cannula, high flow  O2 Flow (L/min): 40  O2 Concentration (%): 40  I&O's Summary    14 Feb 2025 07:01  -  15 Feb 2025 07:00  --------------------------------------------------------  IN: 2188 mL / OUT: 2826 mL / NET: -638 mL        PHYSICAL EXAM:  General: No acute distress BMI-24  HEENT: EOMI, PERRL  Neck: Supple, [ ] JVD  Lungs: Equal air entry bilaterally; [ ] rales [ ] wheezing [ ] rhonchi  Heart: Irregular rate and rhythm; [x ] murmur   2/6 [ x] systolic [ ] diastolic [ ] radiation[ ] rubs [ ]  gallops  Abdomen: Nontender, bowel sounds present  Extremities: No clubbing, cyanosis, [ ] edema [ ]Pulses  equal and intact  Nervous system:  Alert & Oriented X3, no focal deficits  Psychiatric: Normal affect  Skin: No rashes or lesions    LABS:  02-15    138  |  97  |  21  ----------------------------<  163[H]  4.8   |  30  |  3.29[H]    Ca    8.8      15 Feb 2025 00:07  Phos  3.3     02-15  Mg     2.4     02-15    TPro  6.6  /  Alb  2.7[L]  /  TBili  0.4  /  DBili  x   /  AST  39  /  ALT  13  /  AlkPhos  109  02-15    Creatinine Trend: 3.29<--, 4.44<--, 4.10<--, 6.47<--, 6.04<--, 5.88<--                        7.9    9.72  )-----------( 352      ( 15 Feb 2025 00:07 )             25.5     PT/INR - ( 14 Feb 2025 08:33 )   PT: 15.6 sec;   INR: 1.37 ratio         PTT - ( 15 Feb 2025 05:45 )  PTT:34.9 sec

## 2025-02-15 NOTE — PROGRESS NOTE ADULT - SUBJECTIVE AND OBJECTIVE BOX
Brief history :   53 yo M with multiple medical problems including CAD s/p PCI in 2024 s/p CABG x 3 on 24    1: Intubated for hypoxia & left lung white out s/p awake bronch with removal of copious mucopurulent secretions  : s/p IABP insertion, sepsis/septic shock due to E coli   ESBL pneumonia, collapsed Left Lung, s/p multiple bronch    tracheostomy tube placement    VRE E. Faecium Bcx   +HSV PCR BAL   tissue cx from back abscess - Serratia/MRSA  - - intermittent bradycardia/junctional episodes with hypotension  2/3: off , off theophylline. iHD 1L UF  : bronch for Left lung collapse wound vac, 2decho w/ moderate pericardial effusion - similar as before, US abd: small - moderate ascites - monitor at this time.  Wound vac placed for sacral wounds  : iHD-1L UF  : bronch today off positive pressure   : concern for left food drop   2/10 : no acute issues - CXR shows improving left sided aeration, pt subjectively reports having difficulty while lying flat  : s/p Paracentesis 3.5 L removed, leukocytosis   : Ascites fluid PMN < 250 (less likely SBP)   sniff test yesterday showed paradoxical diaphragmatic motion    : mucus plugging but able to clear airway with aggressive pulmonary toileting.   : no vent requirement overnight, able to mobilize secretion with pulm toileting  hyperkalemia post HD - workup for possible recirculation underway     ICU Vital Signs Last 24 Hrs  T(C): 36.6 (02-15-25 @ 04:00), Max: 36.8 (25 @ 08:00)  T(F): 97.8 (02-15-25 @ 04:00), Max: 98.2 (25 @ 08:00)  HR: 73 (02-15-25 @ 06:00) (60 - 117)  BP: 122/69 (02-15-25 @ 06:00) (98/52 - 151/79)  BP(mean): 91 (02-15-25 @ 06:00) (72 - 110)  ABP: --  ABP(mean): --  RR: 20 (02-15-25 @ 06:00) (10 - 34)  SpO2: 97% (02-15-25 @ 06:00) (92% - 100%)    I&O's Summary    2025 07:01  -  15 Feb 2025 07:00  --------------------------------------------------------  IN: 2188 mL / OUT: 2826 mL / NET: -638 mL      Mode: standby  FiO2: 40                                7.9    9.72  )-----------( 352      ( 15 Feb 2025 00:07 )             25.5     15 Feb 2025 00:07    138    |  97     |  21     ----------------------------<  163    4.8     |  30     |  3.29     Ca    8.8        15 Feb 2025 00:07  Phos  3.3       15 Feb 2025 00:07  Mg     2.4       15 Feb 2025 00:07    TPro  6.6    /  Alb  2.7    /  TBili  0.4    /  DBili  x      /  AST  39     /  ALT  13     /  AlkPhos  109    15 Feb 2025 00:07    PT/INR - ( 2025 08:33 )   PT: 15.6 sec;   INR: 1.37 ratio         PTT - ( 15 Feb 2025 05:45 )  PTT:34.9 sec    Culture - Blood (collected 2025 13:11)  Source: .Blood BLOOD  Preliminary Report (2025 18:01):    No growth at 48 Hours        MEDICATIONS  (STANDING):  aMIOdarone    Tablet 200 milliGRAM(s) Oral daily  ascorbic acid 500 milliGRAM(s) Oral daily  aspirin  chewable 81 milliGRAM(s) Oral daily  atorvastatin 80 milliGRAM(s) Oral at bedtime  chlorhexidine 0.12% Liquid 15 milliLiter(s) Oral Mucosa every 12 hours  chlorhexidine 2% Cloths 1 Application(s) Topical daily  chlorhexidine 4% Liquid 1 Application(s) Topical <User Schedule>  dextrose 50% Injectable 50 milliLiter(s) IV Push every 15 minutes  epoetin ignacia (EPOGEN) Injectable 53853 Unit(s) IV Push <User Schedule>  ferrous    sulfate Liquid 300 milliGRAM(s) Enteral Tube daily  heparin  Infusion 500 Unit(s)/Hr IV Continuous <Continuous>  insulin lispro (ADMELOG) corrective regimen sliding scale   SubCutaneous every 6 hours  melatonin 5 milliGRAM(s) Oral at bedtime  meropenem  IVPB 500 milliGRAM(s) IV Intermittent every 24 hours  methocarbamol 500 milliGRAM(s) Oral every 8 hours  metoprolol tartrate 12.5 milliGRAM(s) Oral <User Schedule>  mirtazapine 7.5 milliGRAM(s) Oral at bedtime  Nephro-elicia 1 Tablet(s) Oral daily  pantoprazole  Injectable 40 milliGRAM(s) IV Push daily  polyethylene glycol 3350 17 Gram(s) Oral two times a day  sodium chloride 0.65% Nasal 1 Spray(s) Both Nostrils every 12 hours  sodium chloride 0.9%. 1000 milliLiter(s) IV Continuous <Continuous>  sodium zirconium cyclosilicate 10 Gram(s) Oral <User Schedule>  traZODone 50 milliGRAM(s) Oral at bedtime    Home Medications:  aspirin 81 mg oral delayed release tablet: 1 tab(s) orally once a day (23 Dec 2024 16:58)  calcium acetate 667 mg oral tablet: 1 tab(s) orally 3 times a day (23 Dec 2024 16:58)  carvedilol 12.5 mg oral tablet: 1 tab(s) orally every 12 hours (23 Dec 2024 16:56)  clopidogrel 75 mg oral tablet: 1 tab(s) orally once a day (23 Dec 2024 16:56)  furosemide 20 mg oral tablet: 1 tab(s) orally once a day (23 Dec 2024 16:58)  hydrALAZINE 25 mg oral tablet: 1 tab(s) orally 3 times a day (23 Dec 2024 16:58)  lisinopril 40 mg oral tablet: 1 tab(s) orally once a day (23 Dec 2024 16:58)  lovastatin 10 mg oral tablet: 1 tab(s) orally once a day (23 Dec 2024 16:56)  NIFEdipine 90 mg oral tablet, extended release: 1 tab(s) orally once a day (23 Dec 2024 16:58)  Emani-Elicia oral tablet: 1 tab(s) orally once a day (23 Dec 2024 16:58)  traZODone 100 mg oral tablet: 1 tab(s) orally once a day (at bedtime) (23 Dec 2024 16:56)    PHYSICAL EXAM:  Gen : no acute distress  Neck: + trach   Respiratory: decreased in the bases  Cardiovascular: S1 and S2, RRR, no M/G/R  Gastrointestinal: distended, soft   Extremities: No peripheral edema  Vascular: 2+ peripheral pulses      S/p CABG  Respiratory failure due to recurrent left lung plugging now s/p trach  vent weaning   ESRD on HD  Post op AFib   History of RV dysfunction   h/o Recurrent ascites   Acute blood loss anemia   Post operative pain   Sacral decub   Recurrent ascites likely from Chronic RV failure - s/p paracentesis   Left Diaphragm dysfunction       CVS - recovering from CABG - ECHO  LV EF nl, RV remains dilated and hypokinetic (chronic)  AFIB - PO Amio for afib prophylaxis, BB for rate control - anticoagulation started but held for possible procedure today  Chronic RV Dysfunction     Pulm - tolerated TC overnight  Able to mobilize secretions last Bronch 1 wk ago   Being evaluated for possible left diaphragm plication  - since he is clinically improving will continue current care for now   Needs aggressive pulm toileting   Empiric antibiotics for Tracheobronchitis     GI - TF   s/p Drainage of ascites on  -   GI Proph/Bowel regimen     - tolerated HD   persistent hyperkalemia post HD - AV Fistula study for s/o recirculation   plan for Lokelma on non HD days    Heme - acute on chronic anemia (post op)  ASA / Statin  FE/EPO  Anticoagulation for PAF     ID - completed antibiotics for skin and soft tissue infection  Leukocytosis - sent sputum cultures, blood cultures    Empiric Antibiotic (Andrew)     Skin - Sacral decub - wound care, Turning Q2   Optimize nutrition        Care plan discussed with the ICU care team.   Patient remains critical, at risk for life threatening decompensation.    I have spent 30 minutes providing critical care management to this patient.    By signing my name below, I, Sonia Sánchez, attest that this documentation has been prepared under the direction and in the presence of Herminio Mendez MD.   Electronically signed: Sonia Sánchez, 02-15-25 @ 07:44    I, Herminio Menedz, personally performed the services described in this documentation. all medical record entries made by the scribe were at my direction and in my presence. I have reviewed the chart and agree that the record reflects my personal performance and is accurate and complete  Electronically signed: Herminio Mendez MD.    Brief history :   55 yo M with multiple medical problems including CAD s/p PCI in 2024 s/p CABG x 3 on 24    1: Intubated for hypoxia & left lung white out s/p awake bronch with removal of copious mucopurulent secretions  : s/p IABP insertion, sepsis/septic shock due to E coli   ESBL pneumonia, collapsed Left Lung, s/p multiple bronch    tracheostomy tube placement    VRE E. Faecium Bcx   +HSV PCR BAL   tissue cx from back abscess - Serratia/MRSA  - - intermittent bradycardia/junctional episodes with hypotension  2/3: off , off theophylline. iHD 1L UF  : bronch for Left lung collapse wound vac, 2decho w/ moderate pericardial effusion - similar as before, US abd: small - moderate ascites - monitor at this time.  Wound vac placed for sacral wounds  : iHD-1L UF  : bronch today off positive pressure   : concern for left food drop   2/10 : no acute issues - CXR shows improving left sided aeration, pt subjectively reports having difficulty while lying flat  : s/p Paracentesis 3.5 L removed, leukocytosis   : Ascites fluid PMN < 250 (less likely SBP)   sniff test yesterday showed paradoxical diaphragmatic motion    : mucus plugging but able to clear airway with aggressive pulmonary toileting.   : no vent requirement overnight, able to mobilize secretion with pulm toileting  hyperkalemia post HD - workup for possible recirculation underway     ICU Vital Signs Last 24 Hrs  T(C): 36.6 (02-15-25 @ 04:00), Max: 36.8 (25 @ 08:00)  T(F): 97.8 (02-15-25 @ 04:00), Max: 98.2 (25 @ 08:00)  HR: 73 (02-15-25 @ 06:00) (60 - 117)  BP: 122/69 (02-15-25 @ 06:00) (98/52 - 151/79)  BP(mean): 91 (02-15-25 @ 06:00) (72 - 110)  ABP: --  ABP(mean): --  RR: 20 (02-15-25 @ 06:00) (10 - 34)  SpO2: 97% (02-15-25 @ 06:00) (92% - 100%)    I&O's Summary    2025 07:01  -  15 Feb 2025 07:00  --------------------------------------------------------  IN: 2188 mL / OUT: 2826 mL / NET: -638 mL      Mode: standby  FiO2: 40                                7.9    9.72  )-----------( 352      ( 15 Feb 2025 00:07 )             25.5     15 Feb 2025 00:07    138    |  97     |  21     ----------------------------<  163    4.8     |  30     |  3.29     Ca    8.8        15 Feb 2025 00:07  Phos  3.3       15 Feb 2025 00:07  Mg     2.4       15 Feb 2025 00:07    TPro  6.6    /  Alb  2.7    /  TBili  0.4    /  DBili  x      /  AST  39     /  ALT  13     /  AlkPhos  109    15 Feb 2025 00:07    PT/INR - ( 2025 08:33 )   PT: 15.6 sec;   INR: 1.37 ratio         PTT - ( 15 Feb 2025 05:45 )  PTT:34.9 sec    Culture - Blood (collected 2025 13:11)  Source: .Blood BLOOD  Preliminary Report (2025 18:01):    No growth at 48 Hours        MEDICATIONS  (STANDING):  aMIOdarone    Tablet 200 milliGRAM(s) Oral daily  ascorbic acid 500 milliGRAM(s) Oral daily  aspirin  chewable 81 milliGRAM(s) Oral daily  atorvastatin 80 milliGRAM(s) Oral at bedtime  chlorhexidine 0.12% Liquid 15 milliLiter(s) Oral Mucosa every 12 hours  chlorhexidine 2% Cloths 1 Application(s) Topical daily  chlorhexidine 4% Liquid 1 Application(s) Topical <User Schedule>  dextrose 50% Injectable 50 milliLiter(s) IV Push every 15 minutes  epoetin ignacia (EPOGEN) Injectable 44738 Unit(s) IV Push <User Schedule>  ferrous    sulfate Liquid 300 milliGRAM(s) Enteral Tube daily  heparin  Infusion 500 Unit(s)/Hr IV Continuous <Continuous>  insulin lispro (ADMELOG) corrective regimen sliding scale   SubCutaneous every 6 hours  melatonin 5 milliGRAM(s) Oral at bedtime  meropenem  IVPB 500 milliGRAM(s) IV Intermittent every 24 hours  methocarbamol 500 milliGRAM(s) Oral every 8 hours  metoprolol tartrate 12.5 milliGRAM(s) Oral <User Schedule>  mirtazapine 7.5 milliGRAM(s) Oral at bedtime  Nephro-elicia 1 Tablet(s) Oral daily  pantoprazole  Injectable 40 milliGRAM(s) IV Push daily  polyethylene glycol 3350 17 Gram(s) Oral two times a day  sodium chloride 0.65% Nasal 1 Spray(s) Both Nostrils every 12 hours  sodium chloride 0.9%. 1000 milliLiter(s) IV Continuous <Continuous>  sodium zirconium cyclosilicate 10 Gram(s) Oral <User Schedule>  traZODone 50 milliGRAM(s) Oral at bedtime    Home Medications:  aspirin 81 mg oral delayed release tablet: 1 tab(s) orally once a day (23 Dec 2024 16:58)  calcium acetate 667 mg oral tablet: 1 tab(s) orally 3 times a day (23 Dec 2024 16:58)  carvedilol 12.5 mg oral tablet: 1 tab(s) orally every 12 hours (23 Dec 2024 16:56)  clopidogrel 75 mg oral tablet: 1 tab(s) orally once a day (23 Dec 2024 16:56)  furosemide 20 mg oral tablet: 1 tab(s) orally once a day (23 Dec 2024 16:58)  hydrALAZINE 25 mg oral tablet: 1 tab(s) orally 3 times a day (23 Dec 2024 16:58)  lisinopril 40 mg oral tablet: 1 tab(s) orally once a day (23 Dec 2024 16:58)  lovastatin 10 mg oral tablet: 1 tab(s) orally once a day (23 Dec 2024 16:56)  NIFEdipine 90 mg oral tablet, extended release: 1 tab(s) orally once a day (23 Dec 2024 16:58)  Emani-Elicia oral tablet: 1 tab(s) orally once a day (23 Dec 2024 16:58)  traZODone 100 mg oral tablet: 1 tab(s) orally once a day (at bedtime) (23 Dec 2024 16:56)    PHYSICAL EXAM:  Gen : no acute distress  Neck: + trach   Respiratory: decreased in the bases  Cardiovascular: S1 and S2, RRR, no M/G/R  Gastrointestinal: distended, soft   Extremities: No peripheral edema  Vascular: 2+ peripheral pulses      S/p CABG  Respiratory failure due to recurrent left lung plugging now s/p trach  vent weaning   ESRD on HD  Post op AFib   History of RV dysfunction   h/o Recurrent ascites   Acute blood loss anemia   Post operative pain   Sacral decub   Recurrent ascites likely from Chronic RV failure - s/p paracentesis   Left Diaphragm dysfunction       CVS - recovering from CABG - ECHO  LV EF nl, RV remains dilated and hypokinetic (chronic)  AFIB - PO Amio for afib prophylaxis, BB for rate control - anticoagulation started but held for possible procedure today  Chronic RV Dysfunction     Pulm - tolerated TC overnight  Able to mobilize secretions last Bronch 1 wk ago   Being evaluated for possible left diaphragm plication  - since he is clinically improving will continue current conservative care for now  - thoracic team following  Needs aggressive pulm toileting   Empiric antibiotics for Tracheobronchitis     GI - TF   s/p Drainage of ascites on    GI Proph/Bowel regimen     - tolerated HD   persistent hyperkalemia post HD - AV Fistula study for s/o recirculation   plan for Lokelma on non HD days    Heme - acute on chronic anemia (post op)  ASA / Statin  FE/EPO  Anticoagulation for PAF     ID - completed antibiotics for skin and soft tissue infection  Leukocytosis improving   Empiric Antibiotic (Andrew)     Skin - Sacral decub - wound care, Turning Q2   Optimize nutrition        Care plan discussed with the ICU care team.   Patient remains critical, at risk for life threatening decompensation.    I have spent 40 minutes providing critical care management to this patient.    By signing my name below, I, Sonia Sánchez, attest that this documentation has been prepared under the direction and in the presence of Herminio Mendez MD.   Electronically signed: Sonia Sánchez, 02-15-25 @ 07:44    I, Herminio Mendez, personally performed the services described in this documentation. all medical record entries made by the scribe were at my direction and in my presence. I have reviewed the chart and agree that the record reflects my personal performance and is accurate and complete  Electronically signed: Herminio Mendez MD.

## 2025-02-15 NOTE — PROGRESS NOTE ADULT - SUBJECTIVE AND OBJECTIVE BOX
Dr. Yousif  Office [phone] (9 am to 5 pm)  Service: [phone] (5pm to 9am)  Chanell RENTERIA      RENAL PROGRESS NOTE: DATE OF SERVICE 02-15-25 @ 15:26    Patient is a 54y old  Male who presents with a chief complaint of CAD s/p CABG (15 Feb 2025 07:13)      Patient seen and examined at bedside. No chest pain/sob    VITALS:  T(F): 96.7 (02-15-25 @ 12:00), Max: 98 (02-14-25 @ 15:40)  HR: 60 (02-15-25 @ 15:05)  BP: 140/74 (02-15-25 @ 13:00)  RR: 12 (02-15-25 @ 13:00)  SpO2: 100% (02-15-25 @ 15:05)  Wt(kg): --    02-14 @ 07:01  -  02-15 @ 07:00  --------------------------------------------------------  IN: 2188 mL / OUT: 2826 mL / NET: -638 mL    02-15 @ 07:01  -  02-15 @ 15:26  --------------------------------------------------------  IN: 695 mL / OUT: 1000 mL / NET: -305 mL          PHYSICAL EXAM:  Constitutional: NAD  Neck: No JVD  Respiratory: CTAB, no wheezes, rales or rhonchi  Cardiovascular: S1, S2, RRR  Gastrointestinal: BS+, soft, NT/ND  Extremities: No peripheral edema    Hospital Medications:   MEDICATIONS  (STANDING):  aMIOdarone    Tablet 200 milliGRAM(s) Oral daily  ascorbic acid 500 milliGRAM(s) Oral daily  aspirin  chewable 81 milliGRAM(s) Oral daily  atorvastatin 80 milliGRAM(s) Oral at bedtime  chlorhexidine 0.12% Liquid 15 milliLiter(s) Oral Mucosa every 12 hours  chlorhexidine 2% Cloths 1 Application(s) Topical daily  chlorhexidine 4% Liquid 1 Application(s) Topical <User Schedule>  dextrose 50% Injectable 50 milliLiter(s) IV Push every 15 minutes  epoetin ignacia (EPOGEN) Injectable 45066 Unit(s) IV Push <User Schedule>  ferrous    sulfate Liquid 300 milliGRAM(s) Enteral Tube daily  heparin  Infusion 500 Unit(s)/Hr (11 mL/Hr) IV Continuous <Continuous>  insulin lispro (ADMELOG) corrective regimen sliding scale   SubCutaneous every 6 hours  melatonin 5 milliGRAM(s) Oral at bedtime  meropenem  IVPB 500 milliGRAM(s) IV Intermittent every 24 hours  methocarbamol 500 milliGRAM(s) Oral every 8 hours  metoprolol tartrate 12.5 milliGRAM(s) Oral <User Schedule>  mirtazapine 7.5 milliGRAM(s) Oral at bedtime  Nephro-elicia 1 Tablet(s) Oral daily  pantoprazole  Injectable 40 milliGRAM(s) IV Push daily  polyethylene glycol 3350 17 Gram(s) Oral two times a day  sodium chloride 0.65% Nasal 1 Spray(s) Both Nostrils every 12 hours  sodium chloride 0.9%. 1000 milliLiter(s) (10 mL/Hr) IV Continuous <Continuous>  sodium zirconium cyclosilicate 10 Gram(s) Oral <User Schedule>  traZODone 50 milliGRAM(s) Oral at bedtime      LABS:  02-15    x   |  x   |  19  ----------------------------<  x   x    |  x   |  x     Ca    8.8      15 Feb 2025 00:07  Phos  3.3     02-15  Mg     2.4     02-15    TPro  6.6  /  Alb  2.7[L]  /  TBili  0.4  /  DBili      /  AST  39  /  ALT  13  /  AlkPhos  109  02-15    Creatinine Trend: 3.29 <--, 4.44 <--, 4.10 <--, 6.47 <--, 6.04 <--, 5.88 <--, 5.80 <--, 5.62 <--, 5.48 <--, 4.69 <--, 5.93 <--, 5.13 <--    Albumin: 2.7 g/dL (02-15 @ 00:07)  Phosphorus: 3.3 mg/dL (02-15 @ 00:07)                              7.9    9.72  )-----------( 352      ( 15 Feb 2025 00:07 )             25.5     Urine Studies:  Urinalysis - [02-15-25 @ 00:07]      Color  / Appearance  / SG  / pH       Gluc 163 / Ketone   / Bili  / Urobili        Blood  / Protein  / Leuk Est  / Nitrite       RBC  / WBC  / Hyaline  / Gran  / Sq Epi  / Non Sq Epi  / Bacteria       Iron 29, TIBC 143, %sat 20      [12-19-24 @ 05:00]  Ferritin 904      [12-19-24 @ 05:00]  TSH 4.75      [12-24-24 @ 06:50]        RADIOLOGY & ADDITIONAL STUDIES:

## 2025-02-15 NOTE — PROGRESS NOTE ADULT - ASSESSMENT
54M with PMH of HTN, HLD, ESRD on HD MWF via LUE AVF, ascites (requiring repeat paracenteses q4-6wks), NICM with moderate LV dysfunction w/ EF 35-40%() and CAD s/p atherectomy + SALLIE to D1(2024) presents to [facility] transferred from [facility]. Patient initially presented to [facility] ED with shortness of breath. Of note he was recently admitted from - for acute cholecystitis s/p cholecystectomy. In [facility] ED, pt was hypoxic to 86% on RA, trop 1.7K, EKG no new changes, CXR fluid overload. Admitted for AHRF 2/2 fluid overload. Pt received HD on , ,  and . DAPT was resumed, TTE showed improvement in LVEF to 40-45%. Stress test was abnormal with 1mm inferior STD, reversible ischemia in the inferior wall and fixed defects in the lateral, anterior and apical walls with post stress EF of 24%.     Pt was then transferred to [facility] for cardiac cath which showed pLAD 70% ISR, mLCx 70%, D1 severe dz. Patient now transferred to [facility] CTS Dr. Rivers for CABG eval.# CAD s/p NSTEMI  Hx CAD s/p PCI LAD   Presented with ADHF elevated troponins  Positive NST1-Cath- pLAD 70% ISR, mLCx 70%, D1 severe dz.   TTE EF 35% Severe TRWas on Plavix-p2y12- 321 been off Plavix since -POD#1-C3L free LIMA-LAD; SVG-Diag; HJZ-vpkgSB-Duamplgpc on  Sinus rhythm,Amio for AF prophylaxis  -OOB off  Sinus rhythm   -POD#3-On /pressors-EF 25-30% RV failure with transaminitis-Sinus Tyndwf42/03-POD#4-Was intubated for hypoxia-gradual hypotension on /pressors had IABP inserted this morning  -POD#5-s/p IABP insertion, sepsis/septic shock due to GNR/ECOLI HD cath placed   Bronched yesterday 1/3 - minimal secretions with rust-tinged sputum   ESBL-E Coli bacteremia on meropenam    - POD#7 Atrial Fib ,remains intubated weaning IABP 1:3,off pressors on CRRT  -IABP removed.Remains on vent-Alex 10ppm,off Levo- CVP 10 / MAP 71 / Hct 25.6% / Lactate 1.7-Atrial Fibrillation  -Intubated on Alex 10ppm, gtt, BP better-AF~~90's on Amio-had bronch still febrile Tmax 40524/-Extubated awake alert on HFNC AF,on Dobutrex-CRRT,Cultures no growth    01/10-OOB on BiPAP Sinus Rhythm on -SR/ MAP 57/ CVP 10/  Hct 26.7 / Lact 1.4  -s/p EDU/DCCV-Sinus rhythm on -SR / MAP 52 / CVP 12/ Hct 25.8 / Lact 0.7-had bronch with BAL Left lung  -Sinus rhythm on -for repeat Bronch-SR / MAP 67 / CVP 11 / Hct 27.4% / Lact 0.8  -s/p Trach on  TTE EF 55%-SR / CVP 2 / MAP 63 / Hct 25.9% / Lactate 0.9  -Awake on Trach collar off  c/o buttock pain had bronch yesterday-BAL-Sinus rhythm  -Sinus rhythm on vent at night-BP stable may need to increase hydrallazine 25 mg q8  -TTE EF 60% still needs Vent support at night Bradycardic trial of Bryce now back on   -Awake alert Sinus rhythm BP elevated  remains on ,Nocturnal vent support  resume Hydralazine 25mg q8  -Reverted to AF rvr  -s/p BRonc/BAL debridement sacral decub  -Awake noted AF RVR no further bradyarrhythmias-Started BBlockers  02/10-Persistent AF tolerating Amio BBlockers Pericardial Effusion decreased consider resuming AC  -Seen by Thoracic-? Diaphragmatic plication,AF on HD  -Sniff test +      # Acute on Chronic Systolic Heart Failure now improved  EF-35% ,severe TR  Had HD post op  GDMT post op  LVEF improved post bypass   -TTE EF 40%,trace effusion  ECG c/w pericarditis-was on colchicine   01/15-TTE EF 55%  -TTE EF 60%   -Normal LV systolic function Moderate pericardial effusion  -On TC-HF and Vent support at nights   02/10-Vent HS with T Collar trial Ambulated Remains in AF  Repeat TTE Normal LV Systolic function Small Pericardial effusion decreased from 2/3        Vascular evaluated  AVGraft /?steal causing RV failure-'' Very low suspicion of steal Sd and AVF causing current clinical state. ''   VA Duplex Hemodialysis Access, Left (25 @ 13:35) >   Arterial flow volume of 1301 mL/m, and the venous flow volume of  1492 mL/m are consistent with a normally functioning dialysis access  fistula.      # Ascites  Hx chronic ascites  Has drainage q4-6 weeks  Last drained -4600mL  ? ascites related to severe TR  Had wbdpyygqcxrd-Rzswjzh-bp growth   CT Abdomen 01/10-Large volume ascites-  US abdomen--Small to moderate volume abdominal/pelvic ascites      # ESRD  Now off CRRT transition to HD

## 2025-02-15 NOTE — PROGRESS NOTE ADULT - ASSESSMENT
54M with PMH of HTN, HLD, ESRD on HD MWF via LUE AVF, ascites (requiring repeat paracenteses q4-6wks), NICM with moderate LV dysfunction w/ EF 35-40%(2023) and CAD s/p atherectomy + SALLIE to D1(4/11/2024) presents to [facility] transferred from [facility] for CABG eval  Nephro on [facility] for HD    ESRD on HD   Regular schedule MWF   Access LUE AVF  Center [facility]  Nephrologist Dr. Bailey  S/p HD on 01/29, 2/3 and 02/05  S/p HD on 02/07   s/p hd 2/10 uf 3L  S/p PUF on -2/11 and HD on 02/12  S/p HD on 02/13, 02/14 for Hyperkalemia   HD today again for Hyperkalemia, obtain recirculation study  Keep MAP>65  Renal Diet  Consent in physical chart    Hyper/Hypokalemia  Monitor K    HTN  bp acceptable  monitor bp     CKD MBD  Can send a PTH  Ca and Phos daily    Anemia  CRISTIANE with HD  Transfuse if hgb <7    CAD with multivessel disease  s/p CABG 12/30  CTICU following    Hypoxic Resp failure requiring Trach  Per surgery  S/p bronchoscopy, pulm following

## 2025-02-16 LAB
ALBUMIN SERPL ELPH-MCNC: 2.9 G/DL — LOW (ref 3.3–5)
ALP SERPL-CCNC: 101 U/L — SIGNIFICANT CHANGE UP (ref 40–120)
ALT FLD-CCNC: 8 U/L — LOW (ref 10–45)
ANION GAP SERPL CALC-SCNC: 12 MMOL/L — SIGNIFICANT CHANGE UP (ref 5–17)
APTT BLD: 49.4 SEC — HIGH (ref 24.5–35.6)
APTT BLD: 55 SEC — HIGH (ref 24.5–35.6)
APTT BLD: 57.3 SEC — HIGH (ref 24.5–35.6)
AST SERPL-CCNC: 17 U/L — SIGNIFICANT CHANGE UP (ref 10–40)
BUN SERPL-MCNC: 17 MG/DL — SIGNIFICANT CHANGE UP (ref 7–23)
CALCIUM SERPL-MCNC: 8.9 MG/DL — SIGNIFICANT CHANGE UP (ref 8.4–10.5)
CHLORIDE SERPL-SCNC: 97 MMOL/L — SIGNIFICANT CHANGE UP (ref 96–108)
CO2 SERPL-SCNC: 29 MMOL/L — SIGNIFICANT CHANGE UP (ref 22–31)
CREAT SERPL-MCNC: 2.93 MG/DL — HIGH (ref 0.5–1.3)
CULTURE RESULTS: SIGNIFICANT CHANGE UP
EGFR: 24 ML/MIN/1.73M2 — LOW
EGFR: 24 ML/MIN/1.73M2 — LOW
FIBRINOGEN PPP-MCNC: 278 MG/DL — SIGNIFICANT CHANGE UP (ref 200–445)
GLUCOSE SERPL-MCNC: 124 MG/DL — HIGH (ref 70–99)
HCT VFR BLD CALC: 26 % — LOW (ref 39–50)
HGB BLD-MCNC: 8.1 G/DL — LOW (ref 13–17)
INR BLD: 1.2 RATIO — HIGH (ref 0.85–1.16)
INR BLD: 1.21 RATIO — HIGH (ref 0.85–1.16)
INR BLD: 1.21 RATIO — HIGH (ref 0.85–1.16)
MAGNESIUM SERPL-MCNC: 2.3 MG/DL — SIGNIFICANT CHANGE UP (ref 1.6–2.6)
MCHC RBC-ENTMCNC: 31.2 G/DL — LOW (ref 32–36)
MCHC RBC-ENTMCNC: 31.2 PG — SIGNIFICANT CHANGE UP (ref 27–34)
MCV RBC AUTO: 100 FL — SIGNIFICANT CHANGE UP (ref 80–100)
PHOSPHATE SERPL-MCNC: 2.6 MG/DL — SIGNIFICANT CHANGE UP (ref 2.5–4.5)
PLATELET # BLD AUTO: 318 K/UL — SIGNIFICANT CHANGE UP (ref 150–400)
POTASSIUM SERPL-MCNC: 3.6 MMOL/L — SIGNIFICANT CHANGE UP (ref 3.5–5.3)
POTASSIUM SERPL-SCNC: 3.6 MMOL/L — SIGNIFICANT CHANGE UP (ref 3.5–5.3)
PROT SERPL-MCNC: 6.4 G/DL — SIGNIFICANT CHANGE UP (ref 6–8.3)
PROTHROM AB SERPL-ACNC: 13.6 SEC — HIGH (ref 9.9–13.4)
PROTHROM AB SERPL-ACNC: 13.8 SEC — HIGH (ref 9.9–13.4)
PROTHROM AB SERPL-ACNC: 13.9 SEC — HIGH (ref 9.9–13.4)
RBC # BLD: 2.6 M/UL — LOW (ref 4.2–5.8)
RBC # FLD: 21.6 % — HIGH (ref 10.3–14.5)
SODIUM SERPL-SCNC: 138 MMOL/L — SIGNIFICANT CHANGE UP (ref 135–145)
SPECIMEN SOURCE: SIGNIFICANT CHANGE UP
WBC # BLD: 10.07 K/UL — SIGNIFICANT CHANGE UP (ref 3.8–10.5)
WBC # FLD AUTO: 10.07 K/UL — SIGNIFICANT CHANGE UP (ref 3.8–10.5)

## 2025-02-16 PROCEDURE — 99291 CRITICAL CARE FIRST HOUR: CPT

## 2025-02-16 PROCEDURE — 71045 X-RAY EXAM CHEST 1 VIEW: CPT | Mod: 26

## 2025-02-16 RX ORDER — FENTANYL CITRATE-0.9 % NACL/PF 100MCG/2ML
25 SYRINGE (ML) INTRAVENOUS ONCE
Refills: 0 | Status: DISCONTINUED | OUTPATIENT
Start: 2025-02-16 | End: 2025-02-16

## 2025-02-16 RX ADMIN — Medication 15 MILLILITER(S): at 05:14

## 2025-02-16 RX ADMIN — Medication 81 MILLIGRAM(S): at 12:02

## 2025-02-16 RX ADMIN — METOPROLOL SUCCINATE 12.5 MILLIGRAM(S): 50 TABLET, EXTENDED RELEASE ORAL at 07:57

## 2025-02-16 RX ADMIN — MIRTAZAPINE 7.5 MILLIGRAM(S): 30 TABLET, FILM COATED ORAL at 21:05

## 2025-02-16 RX ADMIN — INSULIN LISPRO 2: 100 INJECTION, SOLUTION INTRAVENOUS; SUBCUTANEOUS at 12:18

## 2025-02-16 RX ADMIN — METHOCARBAMOL 500 MILLIGRAM(S): 500 TABLET, FILM COATED ORAL at 21:04

## 2025-02-16 RX ADMIN — Medication 25 MICROGRAM(S): at 12:15

## 2025-02-16 RX ADMIN — METHOCARBAMOL 500 MILLIGRAM(S): 500 TABLET, FILM COATED ORAL at 13:38

## 2025-02-16 RX ADMIN — MEROPENEM 100 MILLIGRAM(S): 1 INJECTION INTRAVENOUS at 13:37

## 2025-02-16 RX ADMIN — Medication 1 SPRAY(S): at 17:58

## 2025-02-16 RX ADMIN — Medication 300 MILLIGRAM(S): at 12:02

## 2025-02-16 RX ADMIN — Medication 40 MILLIGRAM(S): at 12:02

## 2025-02-16 RX ADMIN — Medication 1 APPLICATION(S): at 21:05

## 2025-02-16 RX ADMIN — METHOCARBAMOL 500 MILLIGRAM(S): 500 TABLET, FILM COATED ORAL at 05:20

## 2025-02-16 RX ADMIN — HEPARIN SODIUM 12.5 UNIT(S)/HR: 1000 INJECTION INTRAVENOUS; SUBCUTANEOUS at 21:04

## 2025-02-16 RX ADMIN — Medication 1 APPLICATION(S): at 05:14

## 2025-02-16 RX ADMIN — Medication 500 MILLIGRAM(S): at 12:02

## 2025-02-16 RX ADMIN — OXYCODONE HYDROCHLORIDE 10 MILLIGRAM(S): 30 TABLET ORAL at 08:00

## 2025-02-16 RX ADMIN — Medication 50 MILLIGRAM(S): at 21:04

## 2025-02-16 RX ADMIN — OXYCODONE HYDROCHLORIDE 10 MILLIGRAM(S): 30 TABLET ORAL at 08:30

## 2025-02-16 RX ADMIN — AMIODARONE HYDROCHLORIDE 200 MILLIGRAM(S): 50 INJECTION, SOLUTION INTRAVENOUS at 05:14

## 2025-02-16 RX ADMIN — Medication 25 MICROGRAM(S): at 12:00

## 2025-02-16 RX ADMIN — Medication 1 TABLET(S): at 12:02

## 2025-02-16 RX ADMIN — Medication 5 MILLIGRAM(S): at 21:05

## 2025-02-16 RX ADMIN — ATORVASTATIN CALCIUM 80 MILLIGRAM(S): 80 TABLET, FILM COATED ORAL at 21:05

## 2025-02-16 RX ADMIN — Medication 1 SPRAY(S): at 05:15

## 2025-02-16 NOTE — PROGRESS NOTE ADULT - ASSESSMENT
54M with PMH of HTN, HLD, ESRD on HD MWF via LUE AVF, ascites (requiring repeat paracenteses q4-6wks), NICM with moderate LV dysfunction w/ EF 35-40%() and CAD s/p atherectomy + SALLIE to D1(2024) presents to [facility] transferred from [facility]. Patient initially presented to [facility] ED with shortness of breath. Of note he was recently admitted from - for acute cholecystitis s/p cholecystectomy. In [facility] ED, pt was hypoxic to 86% on RA, trop 1.7K, EKG no new changes, CXR fluid overload. Admitted for AHRF 2/2 fluid overload. Pt received HD on , ,  and . DAPT was resumed, TTE showed improvement in LVEF to 40-45%. Stress test was abnormal with 1mm inferior STD, reversible ischemia in the inferior wall and fixed defects in the lateral, anterior and apical walls with post stress EF of 24%.     Pt was then transferred to [facility] for cardiac cath which showed pLAD 70% ISR, mLCx 70%, D1 severe dz. Patient now transferred to [facility] CTS Dr. Rivers for CABG eval.# CAD s/p NSTEMI  Hx CAD s/p PCI LAD   Presented with ADHF elevated troponins  Positive NST1-Cath- pLAD 70% ISR, mLCx 70%, D1 severe dz.   TTE EF 35% Severe TRWas on Plavix-p2y12- 321 been off Plavix since -POD#1-C3L free LIMA-LAD; SVG-Diag; ANF-xfxvPJ-Egsoqzxys on  Sinus rhythm,Amio for AF prophylaxis  -OOB off  Sinus rhythm   -POD#3-On /pressors-EF 25-30% RV failure with transaminitis-Sinus Kekaay86/03-POD#4-Was intubated for hypoxia-gradual hypotension on /pressors had IABP inserted this morning  -POD#5-s/p IABP insertion, sepsis/septic shock due to GNR/ECOLI HD cath placed   Bronched yesterday 1/3 - minimal secretions with rust-tinged sputum   ESBL-E Coli bacteremia on meropenam    - POD#7 Atrial Fib ,remains intubated weaning IABP 1:3,off pressors on CRRT  -IABP removed.Remains on vent-Alex 10ppm,off Levo- CVP 10 / MAP 71 / Hct 25.6% / Lactate 1.7-Atrial Fibrillation  -Intubated on Alex 10ppm, gtt, BP better-AF~~90's on Amio-had bronch still febrile Tmax 76645/-Extubated awake alert on HFNC AF,on Dobutrex-CRRT,Cultures no growth    01/10-OOB on BiPAP Sinus Rhythm on -SR/ MAP 57/ CVP 10/  Hct 26.7 / Lact 1.4  -s/p EDU/DCCV-Sinus rhythm on -SR / MAP 52 / CVP 12/ Hct 25.8 / Lact 0.7-had bronch with BAL Left lung  -Sinus rhythm on -for repeat Bronch-SR / MAP 67 / CVP 11 / Hct 27.4% / Lact 0.8  -s/p Trach on  TTE EF 55%-SR / CVP 2 / MAP 63 / Hct 25.9% / Lactate 0.9  -Awake on Trach collar off  c/o buttock pain had bronch yesterday-BAL-Sinus rhythm  -Sinus rhythm on vent at night-BP stable may need to increase hydrallazine 25 mg q8  -TTE EF 60% still needs Vent support at night Bradycardic trial of Bryce now back on   -Awake alert Sinus rhythm BP elevated  remains on ,Nocturnal vent support  resume Hydralazine 25mg q8  -Reverted to AF rvr  -s/p BRonc/BAL debridement sacral decub  -Awake noted AF RVR no further bradyarrhythmias-Started BBlockers  02/10-Persistent AF tolerating Amio BBlockers Pericardial Effusion decreased consider resuming AC  -Seen by Thoracic-? Diaphragmatic plication,AF on HD  -Sniff test +  02/15-Awake AF,     # Acute on Chronic Systolic Heart Failure now improved  EF-35% ,severe TR  Had HD post op  GDMT post op  LVEF improved post bypass   -TTE EF 40%,trace effusion  ECG c/w pericarditis-was on colchicine   01/15-TTE EF 55%  -TTE EF 60%   -Normal LV systolic function Moderate pericardial effusion  -On TC-HF and Vent support at nights   02/10-Vent HS with T Collar trial Ambulated Remains in AF  Repeat TTE Normal LV Systolic function Small Pericardial effusion decreased from 2/3        Vascular evaluated  AVGraft /?steal causing RV failure-'' Very low suspicion of steal Sd and AVF causing current clinical state. ''   VA Duplex Hemodialysis Access, Left (25 @ 13:35) >   Arterial flow volume of 1301 mL/m, and the venous flow volume of  1492 mL/m are consistent with a normally functioning dialysis access  fistula.      # Ascites  Hx chronic ascites  Has drainage q4-6 weeks  Last drained -4600mL  ? ascites related to severe TR  Had kkhmnqiutaes-Ppopurt-jx growth   CT Abdomen 01/10-Large volume ascites-  US abdomen--Small to moderate volume abdominal/pelvic ascites      # ESRD  Now off CRRT transition to HD

## 2025-02-16 NOTE — PROGRESS NOTE ADULT - SUBJECTIVE AND OBJECTIVE BOX
Brief history :   53 yo M with multiple medical problems including CAD s/p PCI in 2024 s/p CABG x 3 on 24    1: Intubated for hypoxia & left lung white out s/p awake bronch with removal of copious mucopurulent secretions  : s/p IABP insertion, sepsis/septic shock due to E coli   ESBL pneumonia, collapsed Left Lung, s/p multiple bronch    tracheostomy tube placement    VRE E. Faecium Bcx   +HSV PCR BAL   tissue cx from back abscess - Serratia/MRSA  - - intermittent bradycardia/junctional episodes with hypotension  2/3: off , off theophylline. iHD 1L UF  : bronch for Left lung collapse wound vac, 2decho w/ moderate pericardial effusion - similar as before, US abd: small - moderate ascites - monitor at this time.  Wound vac placed for sacral wounds  : iHD-1L UF  : bronch today off positive pressure   : concern for left food drop   2/10 : no acute issues - CXR shows improving left sided aeration, pt subjectively reports having difficulty while lying flat  : s/p Paracentesis 3.5 L removed, leukocytosis   : Ascites fluid PMN < 250 (less likely SBP)   sniff test yesterday showed paradoxical diaphragmatic motion    : mucus plugging but able to clear airway with aggressive pulmonary toileting.   : no vent requirement overnight, able to mobilize secretion with pulm toileting  hyperkalemia post HD - workup for possible recirculation underway       ICU Vital Signs Last 24 Hrs  T(C): 36.5 (25 @ 04:00), Max: 36.7 (02-15-25 @ 15:05)  T(F): 97.7 (25 @ 04:00), Max: 98.1 (02-15-25 @ 15:05)  HR: 61 (25 @ 06:00) (59 - 101)  BP: 136/77 (25 @ 06:00) (104/50 - 160/77)  BP(mean): 99 (25 @ 06:00) (71 - 110)  ABP: --  ABP(mean): --  RR: 12 (25 @ 06:00) ( - 24)  SpO2: 98% (25 @ 06:00) (91% - 100%)    I&O's Summary    15 Feb 2025 07:01  -  2025 07:00  --------------------------------------------------------  IN: 2191 mL / OUT: 1000 mL / NET: 1191 mL      Mode: off  FiO2: 40                                8.1    10.07 )-----------( 318      ( 2025 00:37 )             26.0     2025 00:36    138    |  97     |  17     ----------------------------<  124    3.6     |  29     |  2.93     Ca    8.9        2025 00:36  Phos  2.6       2025 00:36  Mg     2.3       2025 00:36    TPro  6.4    /  Alb  2.9    /  TBili  0.3    /  DBili  x      /  AST  17     /  ALT  8      /  AlkPhos  101    2025 00:36    PT/INR - ( 2025 03:54 )   PT: 13.9 sec;   INR: 1.21 ratio         PTT - ( 2025 03:54 )  PTT:49.4 sec      MEDICATIONS  (STANDING):  aMIOdarone    Tablet 200 milliGRAM(s) Oral daily  ascorbic acid 500 milliGRAM(s) Oral daily  aspirin  chewable 81 milliGRAM(s) Oral daily  atorvastatin 80 milliGRAM(s) Oral at bedtime  chlorhexidine 0.12% Liquid 15 milliLiter(s) Oral Mucosa every 12 hours  chlorhexidine 2% Cloths 1 Application(s) Topical daily  chlorhexidine 4% Liquid 1 Application(s) Topical <User Schedule>  dextrose 50% Injectable 50 milliLiter(s) IV Push every 15 minutes  epoetin ignacia (EPOGEN) Injectable 97897 Unit(s) IV Push <User Schedule>  ferrous    sulfate Liquid 300 milliGRAM(s) Enteral Tube daily  heparin  Infusion 500 Unit(s)/Hr IV Continuous <Continuous>  insulin lispro (ADMELOG) corrective regimen sliding scale   SubCutaneous every 6 hours  melatonin 5 milliGRAM(s) Oral at bedtime  meropenem  IVPB 500 milliGRAM(s) IV Intermittent every 24 hours  methocarbamol 500 milliGRAM(s) Oral every 8 hours  metoprolol tartrate 12.5 milliGRAM(s) Oral <User Schedule>  mirtazapine 7.5 milliGRAM(s) Oral at bedtime  Nephro-elicia 1 Tablet(s) Oral daily  pantoprazole  Injectable 40 milliGRAM(s) IV Push daily  polyethylene glycol 3350 17 Gram(s) Oral two times a day  sodium chloride 0.65% Nasal 1 Spray(s) Both Nostrils every 12 hours  sodium chloride 0.9%. 1000 milliLiter(s) IV Continuous <Continuous>  sodium zirconium cyclosilicate 10 Gram(s) Oral <User Schedule>  traZODone 50 milliGRAM(s) Oral at bedtime    Home Medications:  aspirin 81 mg oral delayed release tablet: 1 tab(s) orally once a day (23 Dec 2024 16:58)  calcium acetate 667 mg oral tablet: 1 tab(s) orally 3 times a day (23 Dec 2024 16:58)  carvedilol 12.5 mg oral tablet: 1 tab(s) orally every 12 hours (23 Dec 2024 16:56)  clopidogrel 75 mg oral tablet: 1 tab(s) orally once a day (23 Dec 2024 16:56)  furosemide 20 mg oral tablet: 1 tab(s) orally once a day (23 Dec 2024 16:58)  hydrALAZINE 25 mg oral tablet: 1 tab(s) orally 3 times a day (23 Dec 2024 16:58)  lisinopril 40 mg oral tablet: 1 tab(s) orally once a day (23 Dec 2024 16:58)  lovastatin 10 mg oral tablet: 1 tab(s) orally once a day (23 Dec 2024 16:56)  NIFEdipine 90 mg oral tablet, extended release: 1 tab(s) orally once a day (23 Dec 2024 16:58)  Emani-Elicia oral tablet: 1 tab(s) orally once a day (23 Dec 2024 16:58)  traZODone 100 mg oral tablet: 1 tab(s) orally once a day (at bedtime) (23 Dec 2024 16:56)    PHYSICAL EXAM:  Gen : no acute distress  Neck: + trach   Respiratory: decreased in the bases  Cardiovascular: AFib  Gastrointestinal: distended, soft   Extremities: No peripheral edema  Vascular: 2+ peripheral pulses      S/p CABG  Respiratory failure due to recurrent left lung plugging now s/p trach  vent weaning   ESRD on HD  Post op AFib   History of RV dysfunction   h/o Recurrent ascites   Acute blood loss anemia   Post operative pain   Sacral decub   Recurrent ascites likely from Chronic RV failure - s/p paracentesis   Left Diaphragm dysfunction       CVS - recovering from CABG - ECHO  LV EF nl, RV remains dilated and hypokinetic (chronic)  AFIB - PO Amio for afib prophylaxis, BB for rate control - anticoagulation started but held for possible procedure today  Chronic RV Dysfunction   Repeat ECHO next week to eval pericardial effusion on Hep gtt    Pulm - tolerated TC overnight  Able to mobilize secretions last Bronch 1 wk ago   Being evaluated for possible left diaphragm plication  - since he is clinically improving will continue current conservative care for now  - thoracic team following  Needs aggressive pulm toileting   Empiric antibiotics for Tracheobronchitis   Repeat U/S Abd every Monday    GI - TF   s/p Drainage of ascites on    GI Proph/Bowel regimen     - tolerated HD   persistent hyperkalemia post HD - AV Fistula study for s/o recirculation   plan for Lokelma on non HD days  Nephro-Elicia for renal support    Heme - acute on chronic anemia (post op)  ASA / Statin  FE/EPO  Anticoagulation with Heparin gtt for PAF    ID - completed antibiotics for skin and soft tissue infection  Leukocytosis improving   Empiric Antibiotic (Andrew)     Skin - Sacral decub - wound care, Turning Q2   Optimize nutrition          Critical care time spent    min       Care plan discussed with the ICU care team.   Patient remains critical, at risk for life threatening decompensation.    I have spent 30 minutes providing critical care management to this patient.    By signing my name below, I, Mel Castro, attest that this documentation has been prepared under the direction and in the presence of Herminio Mendez MD.   Electronically signed: Mel Castro, 25 @ 07:17    I, Herminio Mendez, personally performed the services described in this documentation. all medical record entries made by the scribe were at my direction and in my presence. I have reviewed the chart and agree that the record reflects my personal performance and is accurate and complete  Electronically signed: Herminio Mendez MD.  Brief history :   53 yo M with multiple medical problems including CAD s/p PCI in 2024 s/p CABG x 3 on 24    1: Intubated for hypoxia & left lung white out s/p awake bronch with removal of copious mucopurulent secretions  : s/p IABP insertion, sepsis/septic shock due to E coli   ESBL pneumonia, collapsed Left Lung, s/p multiple bronch    tracheostomy tube placement    VRE E. Faecium Bcx   +HSV PCR BAL   tissue cx from back abscess - Serratia/MRSA  - - intermittent bradycardia/junctional episodes with hypotension  2/3: off , off theophylline. iHD 1L UF  : bronch for Left lung collapse wound vac, 2decho w/ moderate pericardial effusion - similar as before, US abd: small - moderate ascites - monitor at this time.  Wound vac placed for sacral wounds  : iHD-1L UF  : bronch today off positive pressure   : concern for left food drop   2/10 : no acute issues - CXR shows improving left sided aeration, pt subjectively reports having difficulty while lying flat  : s/p Paracentesis 3.5 L removed, leukocytosis   : Ascites fluid PMN < 250 (less likely SBP)   sniff test yesterday showed paradoxical diaphragmatic motion    : mucus plugging but able to clear airway with aggressive pulmonary toileting.   : no vent requirement overnight, able to mobilize secretion with pulm toileting  hyperkalemia post HD - workup for possible recirculation underway   : On TC X 48 hours - ambulating well, no mucus plugging events      ICU Vital Signs Last 24 Hrs  T(C): 36.5 (25 @ 04:00), Max: 36.7 (02-15-25 @ 15:05)  T(F): 97.7 (25 @ 04:00), Max: 98.1 (02-15-25 @ 15:05)  HR: 61 (25 @ 06:00) (59 - 101)  BP: 136/77 (25 @ 06:00) (104/50 - 160/77)  BP(mean): 99 (02-16-25 @ 06:00) (71 - 110)  RR: 12 (25 @ 06:00) (11 - 24)  SpO2: 98% (25 @ 06:00) (91% - 100%)    I&O's Summary    15 Feb 2025 07:01  -  2025 07:00  --------------------------------------------------------  IN: 2191 mL / OUT: 1000 mL / NET: 1191 mL               8.1    10.07 )-----------( 318      ( 2025 00:37 )             26.0     2025 00:36    138    |  97     |  17     ----------------------------<  124    3.6     |  29     |  2.93     Ca    8.9        2025 00:36  Phos  2.6       2025 00:36  Mg     2.3       2025 00:36    TPro  6.4    /  Alb  2.9    /  TBili  0.3    /  DBili  x      /  AST  17     /  ALT  8      /  AlkPhos  101    2025 00:36    PT/INR - ( 2025 03:54 )   PT: 13.9 sec;   INR: 1.21 ratio    PTT - ( 2025 03:54 )  PTT:49.4 sec      MEDICATIONS  (STANDING):  aMIOdarone    Tablet 200 milliGRAM(s) Oral daily  ascorbic acid 500 milliGRAM(s) Oral daily  aspirin  chewable 81 milliGRAM(s) Oral daily  atorvastatin 80 milliGRAM(s) Oral at bedtime  chlorhexidine 0.12% Liquid 15 milliLiter(s) Oral Mucosa every 12 hours  chlorhexidine 2% Cloths 1 Application(s) Topical daily  chlorhexidine 4% Liquid 1 Application(s) Topical <User Schedule>  dextrose 50% Injectable 50 milliLiter(s) IV Push every 15 minutes  epoetin ignacia (EPOGEN) Injectable 75259 Unit(s) IV Push <User Schedule>  ferrous    sulfate Liquid 300 milliGRAM(s) Enteral Tube daily  heparin  Infusion 500 Unit(s)/Hr IV Continuous <Continuous>  insulin lispro (ADMELOG) corrective regimen sliding scale   SubCutaneous every 6 hours  melatonin 5 milliGRAM(s) Oral at bedtime  meropenem  IVPB 500 milliGRAM(s) IV Intermittent every 24 hours  methocarbamol 500 milliGRAM(s) Oral every 8 hours  metoprolol tartrate 12.5 milliGRAM(s) Oral <User Schedule>  mirtazapine 7.5 milliGRAM(s) Oral at bedtime  Nephro-elicia 1 Tablet(s) Oral daily  pantoprazole  Injectable 40 milliGRAM(s) IV Push daily  polyethylene glycol 3350 17 Gram(s) Oral two times a day  sodium chloride 0.65% Nasal 1 Spray(s) Both Nostrils every 12 hours  sodium chloride 0.9%. 1000 milliLiter(s) IV Continuous <Continuous>  sodium zirconium cyclosilicate 10 Gram(s) Oral <User Schedule>  traZODone 50 milliGRAM(s) Oral at bedtime      PHYSICAL EXAM:  Gen : no acute distress  Neck: + trach   Respiratory: decreased in the bases  Cardiovascular: AFib  Gastrointestinal: distended, soft   Extremities: No peripheral edema  Vascular: 2+ peripheral pulses    S/p CABG  Respiratory failure due to recurrent left lung plugging now s/p trach  vent weaning   ESRD on HD  Post op AFib   History of RV dysfunction   h/o Recurrent ascites   Acute blood loss anemia   Post operative pain   Sacral decub   Recurrent ascites likely from Chronic RV failure - s/p paracentesis   Left Diaphragm dysfunction     CVS - recovering from CABG - ECHO  LV EF nl, RV remains dilated and hypokinetic (chronic)  AFIB - PO Amio for afib prophylaxis, BB for rate control - anticoagulation   Chronic RV Dysfunction   Repeat ECHO next week to eval pericardial effusion on Hep gtt    Pulm - tolerated TC overnight  Able to mobilize secretions last Bronch 1 wk ago   Being evaluated for possible left diaphragm plication  - since he is clinically improving will continue current conservative care for now  - thoracic team following  Needs aggressive pulm toileting   Empiric antibiotics for Tracheobronchitis     GI - TF   s/p Drainage of ascites on  (3.5 L removed)  GI Proph/Bowel regimen     - tolerated HD   Hyperkalemia improved post HD  US studies for concern of recirculation negative    Heme - acute on chronic anemia (post op)  ASA / Statin  FE/EPO  Anticoagulation with Heparin gtt for PAF    ID - completed antibiotics for skin and soft tissue infection  Leukocytosis improving   Empiric Antibiotic (Andrew)     Skin - Sacral decub - wound care, Turning Q2   Optimize nutrition      Critical care time spent 45   min       Care plan discussed with the ICU care team.   Patient remains critical, at risk for life threatening decompensation.    By signing my name below, I, Mel Castro, attest that this documentation has been prepared under the direction and in the presence of Herminio Mendez MD.   Electronically signed: Mel Castro, 25 @ 07:17    I, Herminio Mendez, personally performed the services described in this documentation. all medical record entries made by the scribe were at my direction and in my presence. I have reviewed the chart and agree that the record reflects my personal performance and is accurate and complete  Electronically signed: Herminio Mendez MD.  Brief history :   55 yo M with multiple medical problems including CAD s/p PCI in 2024 s/p CABG x 3 on 24    1: Intubated for hypoxia & left lung white out s/p awake bronch with removal of copious mucopurulent secretions  : s/p IABP insertion, sepsis/septic shock due to E coli   ESBL pneumonia, collapsed Left Lung, s/p multiple bronch    tracheostomy tube placement    VRE E. Faecium Bcx   +HSV PCR BAL   tissue cx from back abscess - Serratia/MRSA  - - intermittent bradycardia/junctional episodes with hypotension  2/3: off , off theophylline. iHD 1L UF  : bronch for Left lung collapse wound vac, 2decho w/ moderate pericardial effusion - similar as before, US abd: small - moderate ascites - monitor at this time.  Wound vac placed for sacral wounds  : iHD-1L UF  : bronch today off positive pressure   : concern for left food drop   2/10 : no acute issues - CXR shows improving left sided aeration, pt subjectively reports having difficulty while lying flat  : s/p Paracentesis 3.5 L removed, leukocytosis   : Ascites fluid PMN < 250 (less likely SBP)   sniff test yesterday showed paradoxical diaphragmatic motion    : mucus plugging but able to clear airway with aggressive pulmonary toileting.   : no vent requirement overnight, able to mobilize secretion with pulm toileting  hyperkalemia post HD - workup for possible recirculation underway   : On TC X 48 hours - ambulating well, no mucus plugging events      ICU Vital Signs Last 24 Hrs  T(C): 36.5 (25 @ 04:00), Max: 36.7 (02-15-25 @ 15:05)  T(F): 97.7 (25 @ 04:00), Max: 98.1 (02-15-25 @ 15:05)  HR: 61 (25 @ 06:00) (59 - 101)  BP: 136/77 (25 @ 06:00) (104/50 - 160/77)  BP(mean): 99 (02-16-25 @ 06:00) (71 - 110)  RR: 12 (25 @ 06:00) (11 - 24)  SpO2: 98% (25 @ 06:00) (91% - 100%)    I&O's Summary    15 Feb 2025 07:01  -  2025 07:00  --------------------------------------------------------  IN: 2191 mL / OUT: 1000 mL / NET: 1191 mL               8.1    10.07 )-----------( 318      ( 2025 00:37 )             26.0     2025 00:36    138    |  97     |  17     ----------------------------<  124    3.6     |  29     |  2.93     Ca    8.9        2025 00:36  Phos  2.6       2025 00:36  Mg     2.3       2025 00:36    TPro  6.4    /  Alb  2.9    /  TBili  0.3    /  DBili  x      /  AST  17     /  ALT  8      /  AlkPhos  101    2025 00:36    PT/INR - ( 2025 03:54 )   PT: 13.9 sec;   INR: 1.21 ratio    PTT - ( 2025 03:54 )  PTT:49.4 sec      MEDICATIONS  (STANDING):  aMIOdarone    Tablet 200 milliGRAM(s) Oral daily  ascorbic acid 500 milliGRAM(s) Oral daily  aspirin  chewable 81 milliGRAM(s) Oral daily  atorvastatin 80 milliGRAM(s) Oral at bedtime  chlorhexidine 0.12% Liquid 15 milliLiter(s) Oral Mucosa every 12 hours  chlorhexidine 2% Cloths 1 Application(s) Topical daily  chlorhexidine 4% Liquid 1 Application(s) Topical <User Schedule>  dextrose 50% Injectable 50 milliLiter(s) IV Push every 15 minutes  epoetin ignacia (EPOGEN) Injectable 75782 Unit(s) IV Push <User Schedule>  ferrous    sulfate Liquid 300 milliGRAM(s) Enteral Tube daily  heparin  Infusion 500 Unit(s)/Hr IV Continuous <Continuous>  insulin lispro (ADMELOG) corrective regimen sliding scale   SubCutaneous every 6 hours  melatonin 5 milliGRAM(s) Oral at bedtime  meropenem  IVPB 500 milliGRAM(s) IV Intermittent every 24 hours  methocarbamol 500 milliGRAM(s) Oral every 8 hours  metoprolol tartrate 12.5 milliGRAM(s) Oral <User Schedule>  mirtazapine 7.5 milliGRAM(s) Oral at bedtime  Nephro-elicia 1 Tablet(s) Oral daily  pantoprazole  Injectable 40 milliGRAM(s) IV Push daily  polyethylene glycol 3350 17 Gram(s) Oral two times a day  sodium chloride 0.65% Nasal 1 Spray(s) Both Nostrils every 12 hours  sodium chloride 0.9%. 1000 milliLiter(s) IV Continuous <Continuous>  sodium zirconium cyclosilicate 10 Gram(s) Oral <User Schedule>  traZODone 50 milliGRAM(s) Oral at bedtime      PHYSICAL EXAM:  Gen : no acute distress  Neck: + trach   Respiratory: decreased in the bases  Cardiovascular: AFib  Gastrointestinal: distended, soft   Extremities: No peripheral edema  Vascular: 2+ peripheral pulses    S/p CABG  Respiratory failure due to recurrent left lung plugging now s/p trach  vent weaning   ESRD on HD  Post op AFib   History of RV dysfunction   h/o Recurrent ascites   Acute blood loss anemia   Post operative pain   Sacral decub   Recurrent ascites likely from Chronic RV failure - s/p paracentesis   Left Diaphragm dysfunction     CVS - recovering from CABG - ECHO  LV EF nl, RV remains dilated and hypokinetic (chronic)  AFIB - PO Amio for afib prophylaxis, BB for rate control - anticoagulation   Chronic RV Dysfunction   Repeat ECHO next week to eval pericardial effusion on Hep gtt    Pulm - tolerated TC overnight  Able to mobilize secretions last Bronch 1 wk ago   Being evaluated for possible left diaphragm plication  - since he is clinically improving will continue current conservative care for now  - thoracic team following  Needs aggressive pulm toileting   Empiric antibiotics for Tracheobronchitis   Plan for trach downsizing (size 7 cuffless trach tmr)     GI - TF   s/p Drainage of ascites on  (3.5 L removed)  GI Proph/Bowel regimen     - tolerated HD   Hyperkalemia improved post HD  US studies for concern of recirculation negative    Heme - acute on chronic anemia (post op)  ASA / Statin  FE/EPO  Anticoagulation with Heparin gtt for PAF    ID - completed antibiotics for skin and soft tissue infection  Leukocytosis improving   Empiric Antibiotic (Andrew)     Skin - Sacral decub - wound care, Turning Q2   Optimize nutrition      Critical care time spent 45   min     Care plan discussed with the ICU care team.   Patient remains critical, at risk for life threatening decompensation.    By signing my name below, I, Mel Castro, attest that this documentation has been prepared under the direction and in the presence of Herminio Mendez MD.   Electronically signed: Mel Castro, 25 @ 07:17    I, Herminio Mendez, personally performed the services described in this documentation. all medical record entries made by the scribe were at my direction and in my presence. I have reviewed the chart and agree that the record reflects my personal performance and is accurate and complete  Electronically signed: Herminio Mendez MD.

## 2025-02-16 NOTE — PROGRESS NOTE ADULT - ASSESSMENT
54M with PMH of HTN, HLD, ESRD on HD MWF via LUE AVF, ascites (requiring repeat paracenteses q4-6wks), NICM with moderate LV dysfunction w/ EF 35-40%(2023) and CAD s/p atherectomy + SALLIE to D1(4/11/2024) presents to [facility] transferred from [facility] for CABG eval  Nephro on [facility] for HD    ESRD on HD   Regular schedule MWF   Access LUE AVF  [facility]  Nephrologist Dr. Bailey  S/p HD on 01/29, 2/3 and 02/05  S/p HD on 02/07   s/p hd 2/10 uf 3L  S/p PUF on -2/11 and HD on 02/12  S/p HD on 02/13, 02/14, 02/15 for Hyperkalemia   HD on Monday  Keep MAP>65  Renal Diet  Consent in physical chart    Hyper/Hypokalemia  Monitor K    HTN  bp acceptable  monitor bp     CKD MBD  Can send a PTH  Ca and Phos daily    Anemia  CRISTIANE with HD  Transfuse if hgb <7    CAD with multivessel disease  s/p CABG 12/30  CTICU following    Hypoxic Resp failure requiring Trach  Per surgery  S/p bronchoscopy, pulm following

## 2025-02-16 NOTE — PROGRESS NOTE ADULT - SUBJECTIVE AND OBJECTIVE BOX
Dr. Yousif  Office [phone] (9 am to 5 pm)  Service: [phone] (5pm to 9am)  Chanell RENTERIA      RENAL PROGRESS NOTE: DATE OF SERVICE 02-16-25 @ 13:39    Patient is a 54y old  Male who presents with a chief complaint of CAD s/p CABG (16 Feb 2025 07:06)      Patient seen and examined at bedside. No chest pain/sob    VITALS:  T(F): 98.6 (02-16-25 @ 12:00), Max: 98.6 (02-16-25 @ 08:00)  HR: 61 (02-16-25 @ 13:00)  BP: 109/56 (02-16-25 @ 13:00)  RR: 43 (02-16-25 @ 13:00)  SpO2: 100% (02-16-25 @ 13:00)  Wt(kg): --    02-15 @ 07:01  -  02-16 @ 07:00  --------------------------------------------------------  IN: 2191 mL / OUT: 1000 mL / NET: 1191 mL    02-16 @ 07:01  -  02-16 @ 13:39  --------------------------------------------------------  IN: 490 mL / OUT: 0 mL / NET: 490 mL          PHYSICAL EXAM:  Constitutional: NAD  Neck: No JVD  Respiratory: CTAB, no wheezes, rales or rhonchi  Cardiovascular: S1, S2, RRR  Gastrointestinal: BS+, soft, NT/ND  Extremities: No peripheral edema    Hospital Medications:   MEDICATIONS  (STANDING):  aMIOdarone    Tablet 200 milliGRAM(s) Oral daily  ascorbic acid 500 milliGRAM(s) Oral daily  aspirin  chewable 81 milliGRAM(s) Oral daily  atorvastatin 80 milliGRAM(s) Oral at bedtime  chlorhexidine 2% Cloths 1 Application(s) Topical daily  chlorhexidine 4% Liquid 1 Application(s) Topical <User Schedule>  dextrose 50% Injectable 50 milliLiter(s) IV Push every 15 minutes  epoetin ignacia (EPOGEN) Injectable 40578 Unit(s) IV Push <User Schedule>  ferrous    sulfate Liquid 300 milliGRAM(s) Enteral Tube daily  heparin  Infusion 500 Unit(s)/Hr (12.5 mL/Hr) IV Continuous <Continuous>  insulin lispro (ADMELOG) corrective regimen sliding scale   SubCutaneous every 6 hours  melatonin 5 milliGRAM(s) Oral at bedtime  meropenem  IVPB 500 milliGRAM(s) IV Intermittent every 24 hours  methocarbamol 500 milliGRAM(s) Oral every 8 hours  metoprolol tartrate 12.5 milliGRAM(s) Oral <User Schedule>  mirtazapine 7.5 milliGRAM(s) Oral at bedtime  Nephro-elicia 1 Tablet(s) Oral daily  pantoprazole  Injectable 40 milliGRAM(s) IV Push daily  sodium chloride 0.65% Nasal 1 Spray(s) Both Nostrils every 12 hours  sodium chloride 0.9%. 1000 milliLiter(s) (10 mL/Hr) IV Continuous <Continuous>  sodium zirconium cyclosilicate 10 Gram(s) Oral <User Schedule>  traZODone 50 milliGRAM(s) Oral at bedtime      LABS:  02-16    138  |  97  |  17  ----------------------------<  124[H]  3.6   |  29  |  2.93[H]    Ca    8.9      16 Feb 2025 00:36  Phos  2.6     02-16  Mg     2.3     02-16    TPro  6.4  /  Alb  2.9[L]  /  TBili  0.3  /  DBili      /  AST  17  /  ALT  8[L]  /  AlkPhos  101  02-16    Creatinine Trend: 2.93 <--, 3.29 <--, 4.44 <--, 4.10 <--, 6.47 <--, 6.04 <--, 5.88 <--, 5.80 <--, 5.62 <--, 5.48 <--, 4.69 <--, 5.93 <--    Albumin: 2.9 g/dL (02-16 @ 00:36)  Phosphorus: 2.6 mg/dL (02-16 @ 00:36)                              8.1    10.07 )-----------( 318      ( 16 Feb 2025 00:37 )             26.0     Urine Studies:  Urinalysis - [02-16-25 @ 00:36]      Color  / Appearance  / SG  / pH       Gluc 124 / Ketone   / Bili  / Urobili        Blood  / Protein  / Leuk Est  / Nitrite       RBC  / WBC  / Hyaline  / Gran  / Sq Epi  / Non Sq Epi  / Bacteria       Iron 29, TIBC 143, %sat 20      [12-19-24 @ 05:00]  Ferritin 904      [12-19-24 @ 05:00]  TSH 4.75      [12-24-24 @ 06:50]        RADIOLOGY & ADDITIONAL STUDIES:

## 2025-02-16 NOTE — PROGRESS NOTE ADULT - SUBJECTIVE AND OBJECTIVE BOX
MR#[id number]  PATIENT NAME:RAAD CANO    DATE OF SERVICE: 02-16-25 @ 07:06  Patient was seen and examined by Solo Quick MD on    02-16-25 @ 07:06 .  Interim events noted.Consultant notes ,Labs,Telemetry reviewed by me       HOSPITAL COURSE: HPI:  54M with PMH of HTN, HLD, ESRD on HD MWF via LUE AVF, ascites (requiring repeat paracenteses q4-6wks), NICM with moderate LV dysfunction w/ EF 35-40%(2023) and CAD s/p atherectomy + SALLIE to D1(4/11/2024) presents to [facility] transferred from [facility]. Patient initially presented to [facility] ED with shortness of breath. Of note he was recently admitted from 09/27-12/02 for acute cholecystitis s/p cholecystectomy. In [facility] ED, pt was hypoxic to 86% on RA, trop 1.7K, EKG no new changes, CXR fluid overload. Admitted for AHRF 2/2 fluid overload. Pt received HD on 12/18, 12/19, 12/20 and 12/22. DAPT was resumed, TTE showed improvement in LVEF to 40-45%. Stress test was abnormal with 1mm inferior STD, reversible ischemia in the inferior wall and fixed defects in the lateral, anterior and apical walls with post stress EF of 24%. Pt was then transferred to [facility] for cardiac cath which showed pLAD 70% ISR, mLCx 70%, D1 severe dz. Patient now transferred to [facility] CTS Dr. Rivers for CABG eval. Patient denies any current SOB or chest pain on admission. Otherwise denies headaches, abdominal pain, urinary or bowel changes, fevers, chills, N/V/D, sick contacts.  (24 Dec 2024 05:07)          INTERIM EVENTS:Patient seen at bedside ,interim events noted.  12/23 Cardiac cath  pLAD 70% ISR, mLCx 70%, D1 severe dz.   12/31-POD#1-C3L free LIMA-LAD; SVG-Diag; SVG-leftPL Awake OOB extubated Sinus rhythm on Dobutamine gtt  02/04-Awake alert on T collar-TC during the day and PC: 12/10//15//50% at night-interrmittent rapid AF noted  02/07-Awake had Bronch yesterday-  02/08-Seen by [facility] for LLE weakness appears peripheral neuropathy  02/10-Awake on T Collar trial is ambulsting-AF  02/12-Awake still c/o orthopnea   02/14-sniff test  showed paradoxical diaphragmatic motion    02/15-Awake AF   02/16-AWake c/o buttock pain AF on T Collar    MEDICATIONS  (STANDING):  aMIOdarone    Tablet 200 milliGRAM(s) Oral daily  ascorbic acid 500 milliGRAM(s) Oral daily  aspirin  chewable 81 milliGRAM(s) Oral daily  atorvastatin 80 milliGRAM(s) Oral at bedtime  chlorhexidine 0.12% Liquid 15 milliLiter(s) Oral Mucosa every 12 hours  chlorhexidine 2% Cloths 1 Application(s) Topical daily  chlorhexidine 4% Liquid 1 Application(s) Topical <User Schedule>  dextrose 50% Injectable 50 milliLiter(s) IV Push every 15 minutes  epoetin ignacia (EPOGEN) Injectable 12611 Unit(s) IV Push <User Schedule>  ferrous    sulfate Liquid 300 milliGRAM(s) Enteral Tube daily  heparin  Infusion 500 Unit(s)/Hr (12.5 mL/Hr) IV Continuous <Continuous>  insulin lispro (ADMELOG) corrective regimen sliding scale   SubCutaneous every 6 hours  melatonin 5 milliGRAM(s) Oral at bedtime  meropenem  IVPB 500 milliGRAM(s) IV Intermittent every 24 hours  methocarbamol 500 milliGRAM(s) Oral every 8 hours  metoprolol tartrate 12.5 milliGRAM(s) Oral <User Schedule>  mirtazapine 7.5 milliGRAM(s) Oral at bedtime  Nephro-elicia 1 Tablet(s) Oral daily  pantoprazole  Injectable 40 milliGRAM(s) IV Push daily  polyethylene glycol 3350 17 Gram(s) Oral two times a day  sodium chloride 0.65% Nasal 1 Spray(s) Both Nostrils every 12 hours  sodium chloride 0.9%. 1000 milliLiter(s) (10 mL/Hr) IV Continuous <Continuous>  sodium zirconium cyclosilicate 10 Gram(s) Oral <User Schedule>  traZODone 50 milliGRAM(s) Oral at bedtime    MEDICATIONS  (PRN):  acetaminophen     Tablet .. 650 milliGRAM(s) Oral every 6 hours PRN Mild Pain (1 - 3)  lidocaine/prilocaine Cream 1 Application(s) Topical daily PRN pre-HD  oxyCODONE    IR 5 milliGRAM(s) Oral every 4 hours PRN Moderate Pain (4 - 6)  oxyCODONE    IR 10 milliGRAM(s) Oral every 4 hours PRN Severe Pain (7 - 10)  sodium chloride 0.9% lock flush 10 milliLiter(s) IV Push every 1 hour PRN Pre/post blood products, medications, blood draw, and to maintain line patency            REVIEW OF SYSTEMS:  Constitutional: [ ] fever, [ ]weight loss,  [ x]fatigue [ ]weight gain  Eyes: [ ] visual changes  Respiratory: [ ]shortness of breath;  [ ] cough, [ ]wheezing, [ ]chills, [ ]hemoptysis  Cardiovascular: [ ] chest pain, [ ]palpitations, [ ]dizziness,  [ ]leg swelling[ ]orthopnea[ ]PND  Gastrointestinal: [ ] abdominal pain, [ ]nausea, [ ]vomiting,  [ ]diarrhea [ ]Constipation [ ]Melena  Genitourinary: [ ] dysuria, [ ] hematuria [ ]Vigil  Neurologic: [ ] headaches [ ] tremors[ ]weakness [ ]Paralysis Right[ ] Left[ ]  Skin: [ ] itching, [ ]burning, [ ] rashes  Endocrine: [ ] heat or cold intolerance  Musculoskeletal: [ ] joint pain or swelling; [x ] muscle, back, or extremity pain  Psychiatric: [ ] depression, [ ]anxiety, [ ]mood swings, or [ ]difficulty sleeping  Hematologic: [ ] easy bruising, [ ] bleeding gums    [ ] All remaining systems negative except as per above.   [ ]Unable to obtain.  [x] No change in ROS since admission      Vital Signs Last 24 Hrs  T(C): 36.5 (16 Feb 2025 04:00), Max: 36.7 (15 Feb 2025 15:05)  T(F): 97.7 (16 Feb 2025 04:00), Max: 98.1 (15 Feb 2025 15:05)  HR: 61 (16 Feb 2025 06:00) (59 - 101)  BP: 136/77 (16 Feb 2025 06:00) (104/50 - 160/77)  BP(mean): 99 (16 Feb 2025 06:00) (71 - 110)  RR: 12 (16 Feb 2025 06:00) (11 - 24)  SpO2: 98% (16 Feb 2025 06:00) (91% - 100%)    Parameters below as of 16 Feb 2025 04:00  Patient On (Oxygen Delivery Method): nasal cannula, high flow  O2 Flow (L/min): 40  O2 Concentration (%): 40  I&O's Summary    15 Feb 2025 07:01  -  16 Feb 2025 07:00  --------------------------------------------------------  IN: 2191 mL / OUT: 1000 mL / NET: 1191 mL        PHYSICAL EXAM:  General: No acute distress BMI-25  HEENT: EOMI, PERRL  Neck: Supple, [ ] JVD  Lungs: Equal air entry bilaterally; [ ] rales [ ] wheezing [ ] rhonchi  Heart: Irregular rate and rhythm; [x ] murmur   2/6 [ x] systolic [ ] diastolic [ ] radiation[ ] rubs [ ]  gallops  Abdomen: Nontender, bowel sounds present  Extremities: No clubbing, cyanosis, [ ] edema [ ]Pulses  equal and intact  Nervous system:  Alert & Oriented X3, no focal deficits  Psychiatric: Normal affect  Skin: No rashes or lesions    LABS:  02-16    138  |  97  |  17  ----------------------------<  124[H]  3.6   |  29  |  2.93[H]    Ca    8.9      16 Feb 2025 00:36  Phos  2.6     02-16  Mg     2.3     02-16    TPro  6.4  /  Alb  2.9[L]  /  TBili  0.3  /  DBili  x   /  AST  17  /  ALT  8[L]  /  AlkPhos  101  02-16    Creatinine Trend: 2.93<--, 3.29<--, 4.44<--, 4.10<--, 6.47<--, 6.04<--                        8.1    10.07 )-----------( 318      ( 16 Feb 2025 00:37 )             26.0     PT/INR - ( 16 Feb 2025 03:54 )   PT: 13.9 sec;   INR: 1.21 ratio         PTT - ( 16 Feb 2025 03:54 )  PTT:49.4 sec    VA Duplex Hemodialysis Access, Left (02.14.25 @ 21:15) >  Inflow artery flow volume: 1154 cc/min  Outflow flow volume: 1669 cc/min    IMPRESSION:  Left upper extremity AV fistula is patent as above.      TTE Limited W or WO Ultrasound Enhancing Agent (02.09.25 @ 14:29) >  CONCLUSIONS:      1. Left ventricular systolic function is normal with an ejection fraction visually estimated at 55 to 60 %.   2. Enlarged right ventricular cavity size and reduced right ventricular systolic function.   3. Small pericardial effusion with no echocardiographic evidence of tamponade physiology: respiratory variation across the mitral valve E wave is not greater than 30%, respiratory variation across the tricuspid valve E wave is not greater than 60%, no diastolic collapse of right atrium, exceding1/3 of the cardiac cycle and no early diastolic inversion of the right ventricle.   4. Compared to the transthoracic echocardiogram performed on 2/3/2025, the pericardial effusion has decreased in size.

## 2025-02-16 NOTE — PROVIDER CONTACT NOTE (OTHER) - BACKGROUND
55 yo M with multiple medical problems including CAD s/p PCI in April 2024 s/p CABG x 3 on 12/30/24.

## 2025-02-17 LAB
ADD ON TEST-SPECIMEN IN LAB: SIGNIFICANT CHANGE UP
ALBUMIN SERPL ELPH-MCNC: 2.8 G/DL — LOW (ref 3.3–5)
ALP SERPL-CCNC: 100 U/L — SIGNIFICANT CHANGE UP (ref 40–120)
ALT FLD-CCNC: 9 U/L — LOW (ref 10–45)
AMYLASE P1 CFR SERPL: 60 U/L — SIGNIFICANT CHANGE UP (ref 25–125)
ANION GAP SERPL CALC-SCNC: 9 MMOL/L — SIGNIFICANT CHANGE UP (ref 5–17)
APTT BLD: 118.5 SEC — HIGH (ref 24.5–35.6)
APTT BLD: 43.2 SEC — HIGH (ref 24.5–35.6)
APTT BLD: 50.8 SEC — HIGH (ref 24.5–35.6)
APTT BLD: 57.2 SEC — HIGH (ref 24.5–35.6)
APTT BLD: 65.1 SEC — HIGH (ref 24.5–35.6)
APTT BLD: >200 SEC — CRITICAL HIGH (ref 24.5–35.6)
AST SERPL-CCNC: 19 U/L — SIGNIFICANT CHANGE UP (ref 10–40)
BASOPHILS # BLD AUTO: 0.05 K/UL — SIGNIFICANT CHANGE UP (ref 0–0.2)
BASOPHILS NFR BLD AUTO: 0.5 % — SIGNIFICANT CHANGE UP (ref 0–2)
BILIRUB SERPL-MCNC: 0.4 MG/DL — SIGNIFICANT CHANGE UP (ref 0.2–1.2)
BUN SERPL-MCNC: 26 MG/DL — HIGH (ref 7–23)
CALCIUM SERPL-MCNC: 8.8 MG/DL — SIGNIFICANT CHANGE UP (ref 8.4–10.5)
CHLORIDE SERPL-SCNC: 99 MMOL/L — SIGNIFICANT CHANGE UP (ref 96–108)
CO2 SERPL-SCNC: 31 MMOL/L — SIGNIFICANT CHANGE UP (ref 22–31)
CREAT SERPL-MCNC: 4.05 MG/DL — HIGH (ref 0.5–1.3)
CULTURE RESULTS: SIGNIFICANT CHANGE UP
EGFR: 17 ML/MIN/1.73M2 — LOW
EGFR: 17 ML/MIN/1.73M2 — LOW
EOSINOPHIL # BLD AUTO: 0.9 K/UL — HIGH (ref 0–0.5)
EOSINOPHIL NFR BLD AUTO: 8.6 % — HIGH (ref 0–6)
GGT SERPL-CCNC: 15 U/L — SIGNIFICANT CHANGE UP (ref 9–50)
GLUCOSE SERPL-MCNC: 131 MG/DL — HIGH (ref 70–99)
HCT VFR BLD CALC: 24.8 % — LOW (ref 39–50)
HGB BLD-MCNC: 7.6 G/DL — LOW (ref 13–17)
IMM GRANULOCYTES NFR BLD AUTO: 0.9 % — SIGNIFICANT CHANGE UP (ref 0–0.9)
INR BLD: 1.19 RATIO — HIGH (ref 0.85–1.16)
INR BLD: 1.22 RATIO — HIGH (ref 0.85–1.16)
INR BLD: 1.26 RATIO — HIGH (ref 0.85–1.16)
LIDOCAIN IGE QN: 75 U/L — HIGH (ref 7–60)
LYMPHOCYTES # BLD AUTO: 0.7 K/UL — LOW (ref 1–3.3)
LYMPHOCYTES # BLD AUTO: 6.7 % — LOW (ref 13–44)
MAGNESIUM SERPL-MCNC: 2.5 MG/DL — SIGNIFICANT CHANGE UP (ref 1.6–2.6)
MCHC RBC-ENTMCNC: 30.8 PG — SIGNIFICANT CHANGE UP (ref 27–34)
MCV RBC AUTO: 100.4 FL — HIGH (ref 80–100)
MONOCYTES # BLD AUTO: 1.01 K/UL — HIGH (ref 0–0.9)
MONOCYTES NFR BLD AUTO: 9.6 % — SIGNIFICANT CHANGE UP (ref 2–14)
NEUTROPHILS # BLD AUTO: 7.77 K/UL — HIGH (ref 1.8–7.4)
NEUTROPHILS NFR BLD AUTO: 73.7 % — SIGNIFICANT CHANGE UP (ref 43–77)
PHOSPHATE SERPL-MCNC: 3.1 MG/DL — SIGNIFICANT CHANGE UP (ref 2.5–4.5)
PLATELET # BLD AUTO: 325 K/UL — SIGNIFICANT CHANGE UP (ref 150–400)
POTASSIUM SERPL-MCNC: 3.9 MMOL/L — SIGNIFICANT CHANGE UP (ref 3.5–5.3)
POTASSIUM SERPL-SCNC: 3.9 MMOL/L — SIGNIFICANT CHANGE UP (ref 3.5–5.3)
PROT SERPL-MCNC: 6.2 G/DL — SIGNIFICANT CHANGE UP (ref 6–8.3)
PROTHROM AB SERPL-ACNC: 14 SEC — HIGH (ref 9.9–13.4)
PROTHROM AB SERPL-ACNC: 14.3 SEC — HIGH (ref 9.9–13.4)
RBC # BLD: 2.47 M/UL — LOW (ref 4.2–5.8)
RBC # FLD: 21.5 % — HIGH (ref 10.3–14.5)
SODIUM SERPL-SCNC: 139 MMOL/L — SIGNIFICANT CHANGE UP (ref 135–145)
WBC # BLD: 10.52 K/UL — HIGH (ref 3.8–10.5)
WBC # FLD AUTO: 10.52 K/UL — HIGH (ref 3.8–10.5)

## 2025-02-17 PROCEDURE — 71045 X-RAY EXAM CHEST 1 VIEW: CPT | Mod: 26,76

## 2025-02-17 PROCEDURE — 99291 CRITICAL CARE FIRST HOUR: CPT

## 2025-02-17 RX ORDER — ONDANSETRON HCL/PF 4 MG/2 ML
4 VIAL (ML) INJECTION ONCE
Refills: 0 | Status: COMPLETED | OUTPATIENT
Start: 2025-02-17 | End: 2025-02-17

## 2025-02-17 RX ADMIN — ATORVASTATIN CALCIUM 80 MILLIGRAM(S): 80 TABLET, FILM COATED ORAL at 21:23

## 2025-02-17 RX ADMIN — METHOCARBAMOL 500 MILLIGRAM(S): 500 TABLET, FILM COATED ORAL at 13:04

## 2025-02-17 RX ADMIN — OXYCODONE HYDROCHLORIDE 10 MILLIGRAM(S): 30 TABLET ORAL at 13:35

## 2025-02-17 RX ADMIN — OXYCODONE HYDROCHLORIDE 10 MILLIGRAM(S): 30 TABLET ORAL at 18:55

## 2025-02-17 RX ADMIN — METOPROLOL SUCCINATE 12.5 MILLIGRAM(S): 50 TABLET, EXTENDED RELEASE ORAL at 16:44

## 2025-02-17 RX ADMIN — Medication 81 MILLIGRAM(S): at 11:30

## 2025-02-17 RX ADMIN — MIRTAZAPINE 7.5 MILLIGRAM(S): 30 TABLET, FILM COATED ORAL at 21:23

## 2025-02-17 RX ADMIN — Medication 1 SPRAY(S): at 17:10

## 2025-02-17 RX ADMIN — METHOCARBAMOL 500 MILLIGRAM(S): 500 TABLET, FILM COATED ORAL at 21:23

## 2025-02-17 RX ADMIN — METOPROLOL SUCCINATE 12.5 MILLIGRAM(S): 50 TABLET, EXTENDED RELEASE ORAL at 08:12

## 2025-02-17 RX ADMIN — Medication 50 MILLIGRAM(S): at 21:23

## 2025-02-17 RX ADMIN — Medication 5 MILLIGRAM(S): at 21:23

## 2025-02-17 RX ADMIN — Medication 300 MILLIGRAM(S): at 11:30

## 2025-02-17 RX ADMIN — Medication 500 MILLIGRAM(S): at 11:30

## 2025-02-17 RX ADMIN — Medication 1 APPLICATION(S): at 05:27

## 2025-02-17 RX ADMIN — Medication 40 MILLIGRAM(S): at 11:29

## 2025-02-17 RX ADMIN — OXYCODONE HYDROCHLORIDE 10 MILLIGRAM(S): 30 TABLET ORAL at 18:28

## 2025-02-17 RX ADMIN — Medication 4 MILLIGRAM(S): at 10:16

## 2025-02-17 RX ADMIN — Medication 1 TABLET(S): at 11:30

## 2025-02-17 RX ADMIN — OXYCODONE HYDROCHLORIDE 10 MILLIGRAM(S): 30 TABLET ORAL at 13:05

## 2025-02-17 NOTE — PROGRESS NOTE ADULT - ASSESSMENT
54M with PMH of HTN, HLD, ESRD on HD MWF via LUE AVF, ascites (requiring repeat paracenteses q4-6wks), NICM with moderate LV dysfunction w/ EF 35-40%() and CAD s/p atherectomy + SALLIE to D1(2024) presents to [facility] transferred from [facility]. Patient initially presented to [facility] ED with shortness of breath. Of note he was recently admitted from - for acute cholecystitis s/p cholecystectomy. In [facility] ED, pt was hypoxic to 86% on RA, trop 1.7K, EKG no new changes, CXR fluid overload. Admitted for AHRF 2/2 fluid overload. Pt received HD on , ,  and . DAPT was resumed, TTE showed improvement in LVEF to 40-45%. Stress test was abnormal with 1mm inferior STD, reversible ischemia in the inferior wall and fixed defects in the lateral, anterior and apical walls with post stress EF of 24%.     Pt was then transferred to [facility] for cardiac cath which showed pLAD 70% ISR, mLCx 70%, D1 severe dz. Patient now transferred to [facility] CTS Dr. Rivers for CABG eval.# CAD s/p NSTEMI  Hx CAD s/p PCI LAD   Presented with ADHF elevated troponins  Positive NST1-Cath- pLAD 70% ISR, mLCx 70%, D1 severe dz.   TTE EF 35% Severe TRWas on Plavix-p2y12- 321 been off Plavix since -POD#1-C3L free LIMA-LAD; SVG-Diag; QYC-wrviLN-Jtxejvulc on  Sinus rhythm,Amio for AF prophylaxis  -OOB off  Sinus rhythm   -POD#3-On /pressors-EF 25-30% RV failure with transaminitis-Sinus Rjabkf60/03-POD#4-Was intubated for hypoxia-gradual hypotension on /pressors had IABP inserted this morning  -POD#5-s/p IABP insertion, sepsis/septic shock due to GNR/ECOLI HD cath placed   Bronched yesterday 1/3 - minimal secretions with rust-tinged sputum   ESBL-E Coli bacteremia on meropenam  - POD#7 Atrial Fib ,remains intubated weaning IABP 1:3,off pressors on CRRT  -IABP removed.Remains on vent-Alex 10ppm,off Levo- CVP 10 / MAP 71 / Hct 25.6% / Lactate 1.7-Atrial Fibrillation  -Intubated on Alex 10ppm, gtt, BP better-AF~~90's on Amio-had bronch still febrile Tmax 09948/-Extubated awake alert on HFNC AF,on Dobutrex-CRRT,Cultures no growth  01/10-OOB on BiPAP Sinus Rhythm on -SR/ MAP 57/ CVP 10/  Hct 26.7 / Lact 1.4  -s/p EDU/DCCV-Sinus rhythm on -SR / MAP 52 / CVP 12/ Hct 25.8 / Lact 0.7-had bronch with BAL Left lung  -Sinus rhythm on -for repeat Bronch-SR / MAP 67 / CVP 11 / Hct 27.4% / Lact 0.8  -s/p Trach on  TTE EF 55%-SR / CVP 2 / MAP 63 / Hct 25.9% / Lactate 0.9  -Awake on Trach collar off  c/o buttock pain had bronch yesterday-BAL-Sinus rhythm  -Sinus rhythm on vent at night-BP stable may need to increase hydrallazine 25 mg q8  -TTE EF 60% still needs Vent support at night Bradycardic trial of Bryce now back on   -Awake alert Sinus rhythm BP elevated  remains on ,Nocturnal vent support  resume Hydralazine 25mg q8  -Reverted to AF rvr  -s/p BRonc/BAL debridement sacral decub  -Awake noted AF RVR no further bradyarrhythmias-Started BBlockers  02/10-Persistent AF tolerating Amio BBlockers Pericardial Effusion decreased consider resuming AC  -Seen by Thoracic-? Diaphragmatic plication,AF on HD  -Sniff test +  02/15-Awake AF,   -Tolerating TC-remains in AF/AFl is ambulating    # Acute on Chronic Systolic Heart Failure now improved  EF-35% ,severe TR  Had HD post op  GDMT post op  LVEF improved post bypass   -TTE EF 40%,trace effusion  ECG c/w pericarditis-was on colchicine   01/15-TTE EF 55%  -TTE EF 60%   -Normal LV systolic function Moderate pericardial effusion  -On TC-HF and Vent support at nights   02/10-Vent HS with T Collar trial Ambulated Remains in AF  Repeat TTE Normal LV Systolic function Small Pericardial effusion decreased from 2/3        Vascular evaluated  AVGraft /?steal causing RV failure-'' Very low suspicion of steal Sd and AVF causing current clinical state. ''   VA Duplex Hemodialysis Access, Left (25 @ 13:35) >   Arterial flow volume of 1301 mL/m, and the venous flow volume of  1492 mL/m are consistent with a normally functioning dialysis access  fistula.      # Ascites  Hx chronic ascites  Has drainage q4-6 weeks  Last drained -4600mL  ? ascites related to severe TR  Had oehfwlrzmniu-Ewnzrhy-dv growth   CT Abdomen 01/10-Large volume ascites-  US abdomen--Small to moderate volume abdominal/pelvic ascites      # ESRD  Now off CRRT transition to HD

## 2025-02-17 NOTE — PROGRESS NOTE ADULT - SUBJECTIVE AND OBJECTIVE BOX
MR#[id number]  PATIENT NAME:RAAD CANO    DATE OF SERVICE: 02-17-25 @ 06:38  Patient was seen and examined by Solo Quick MD on    02-17-25 @ 06:38 .  Interim events noted.Consultant notes ,Labs,Telemetry reviewed by me       HOSPITAL COURSE: HPI:  54M with PMH of HTN, HLD, ESRD on HD MWF via LUE AVF, ascites (requiring repeat paracenteses q4-6wks), NICM with moderate LV dysfunction w/ EF 35-40%(2023) and CAD s/p atherectomy + SALLIE to D1(4/11/2024) presents to [facility] transferred from [facility]. Patient initially presented to [facility] ED with shortness of breath. Of note he was recently admitted from 09/27-12/02 for acute cholecystitis s/p cholecystectomy. In [facility] ED, pt was hypoxic to 86% on RA, trop 1.7K, EKG no new changes, CXR fluid overload. Admitted for AHRF 2/2 fluid overload. Pt received HD on 12/18, 12/19, 12/20 and 12/22. DAPT was resumed, TTE showed improvement in LVEF to 40-45%. Stress test was abnormal with 1mm inferior STD, reversible ischemia in the inferior wall and fixed defects in the lateral, anterior and apical walls with post stress EF of 24%. Pt was then transferred to [facility] for cardiac cath which showed pLAD 70% ISR, mLCx 70%, D1 severe dz. Patient now transferred to [facility] CTS Dr. Rivers for CABG eval. Patient denies any current SOB or chest pain on admission. Otherwise denies headaches, abdominal pain, urinary or bowel changes, fevers, chills, N/V/D, sick contacts.  (24 Dec 2024 05:07)    INTERIM EVENTS:Patient seen at bedside ,interim events noted.  12/23 Cardiac cath  pLAD 70% ISR, mLCx 70%, D1 severe dz.   12/31-POD#1-C3L free LIMA-LAD; SVG-Diag; SVG-leftPL Awake OOB extubated Sinus rhythm on Dobutamine gtt  02/04-Awake alert on T collar-TC during the day and PC: 12/10//15//50% at night-interrmittent rapid AF noted  02/07-Awake had Bronch yesterday-  02/08-Seen by [facility] for LLE weakness appears peripheral neuropathy  02/10-Awake on T Collar trial is ambulsting-AF  02/12-Awake still c/o orthopnea   02/14-sniff test  showed paradoxical diaphragmatic motion    02/15-Awake AF   02/16-AWake c/o buttock pain AF on T Collar  02/17-Awake tolerating T Collar is ambulating,AF      MEDICATIONS  (STANDING):  aMIOdarone    Tablet 200 milliGRAM(s) Oral daily  ascorbic acid 500 milliGRAM(s) Oral daily  aspirin  chewable 81 milliGRAM(s) Oral daily  atorvastatin 80 milliGRAM(s) Oral at bedtime  chlorhexidine 2% Cloths 1 Application(s) Topical daily  chlorhexidine 4% Liquid 1 Application(s) Topical <User Schedule>  dextrose 50% Injectable 50 milliLiter(s) IV Push every 15 minutes  epoetin ignacia (EPOGEN) Injectable 83196 Unit(s) IV Push <User Schedule>  ferrous    sulfate Liquid 300 milliGRAM(s) Enteral Tube daily  heparin  Infusion 500 Unit(s)/Hr (12.5 mL/Hr) IV Continuous <Continuous>  insulin lispro (ADMELOG) corrective regimen sliding scale   SubCutaneous every 6 hours  melatonin 5 milliGRAM(s) Oral at bedtime  meropenem  IVPB 500 milliGRAM(s) IV Intermittent every 24 hours  methocarbamol 500 milliGRAM(s) Oral every 8 hours  metoprolol tartrate 12.5 milliGRAM(s) Oral <User Schedule>  mirtazapine 7.5 milliGRAM(s) Oral at bedtime  Nephro-elicia 1 Tablet(s) Oral daily  pantoprazole  Injectable 40 milliGRAM(s) IV Push daily  sodium chloride 0.65% Nasal 1 Spray(s) Both Nostrils every 12 hours  sodium chloride 0.9%. 1000 milliLiter(s) (10 mL/Hr) IV Continuous <Continuous>  sodium zirconium cyclosilicate 10 Gram(s) Oral <User Schedule>  traZODone 50 milliGRAM(s) Oral at bedtime    MEDICATIONS  (PRN):  acetaminophen     Tablet .. 650 milliGRAM(s) Oral every 6 hours PRN Mild Pain (1 - 3)  lidocaine/prilocaine Cream 1 Application(s) Topical daily PRN pre-HD  oxyCODONE    IR 5 milliGRAM(s) Oral every 4 hours PRN Moderate Pain (4 - 6)  oxyCODONE    IR 10 milliGRAM(s) Oral every 4 hours PRN Severe Pain (7 - 10)  sodium chloride 0.9% lock flush 10 milliLiter(s) IV Push every 1 hour PRN Pre/post blood products, medications, blood draw, and to maintain line patency            REVIEW OF SYSTEMS:  Constitutional: [ ] fever, [ ]weight loss,  [ ]fatigue [ ]weight gain  Eyes: [ ] visual changes  Respiratory: [ ]shortness of breath;  [ ] cough, [ ]wheezing, [ ]chills, [ ]hemoptysis  Cardiovascular: [ ] chest pain, [ ]palpitations, [ ]dizziness,  [ ]leg swelling[ ]orthopnea[ ]PND  Gastrointestinal: [ ] abdominal pain, [ ]nausea, [ ]vomiting,  [ ]diarrhea [ ]Constipation [ ]Melena  Genitourinary: [ ] dysuria, [ ] hematuria [ ]Vigil  Neurologic: [ ] headaches [ ] tremors[ ]weakness [ ]Paralysis Right[ ] Left[ ]  Skin: [ ] itching, [ ]burning, [ ] rashes  Endocrine: [ ] heat or cold intolerance  Musculoskeletal: [ ] joint pain or swelling; [ ] muscle, back, or extremity pain  Psychiatric: [ ] depression, [ ]anxiety, [ ]mood swings, or [ ]difficulty sleeping  Hematologic: [ ] easy bruising, [ ] bleeding gums    [ ] All remaining systems negative except as per above.   [ ]Unable to obtain.  [x] No change in ROS since admission      Vital Signs Last 24 Hrs  T(C): 36.9 (17 Feb 2025 00:00), Max: 37 (16 Feb 2025 08:00)  T(F): 98.4 (17 Feb 2025 00:00), Max: 98.6 (16 Feb 2025 08:00)  HR: 62 (17 Feb 2025 06:00) (60 - 105)  BP: 178/80 (17 Feb 2025 06:00) (103/54 - 178/80)  BP(mean): 115 (17 Feb 2025 06:00) (75 - 115)  RR: 15 (17 Feb 2025 06:00) (12 - 43)  SpO2: 100% (17 Feb 2025 06:00) (95% - 100%)    Parameters below as of 17 Feb 2025 04:00  Patient On (Oxygen Delivery Method): nasal cannula, high flow  O2 Flow (L/min): 30  O2 Concentration (%): 40  I&O's Summary    15 Feb 2025 07:01  -  16 Feb 2025 07:00  --------------------------------------------------------  IN: 2191 mL / OUT: 1000 mL / NET: 1191 mL    16 Feb 2025 07:01  -  17 Feb 2025 06:38  --------------------------------------------------------  IN: 1545 mL / OUT: 0 mL / NET: 1545 mL        PHYSICAL EXAM:  General: No acute distress BMI-24  HEENT: EOMI, PERRL  Neck: Supple, [ ] JVD  Lungs: Equal air entry bilaterally; [ ] rales [ ] wheezing [ ] rhonchi  Heart: Regular rate and rhythm; [x ] murmur   2/6 [ x] systolic [ ] diastolic [ ] radiation[ ] rubs [ ]  gallops  Abdomen: Nontender, bowel sounds present  Extremities: No clubbing, cyanosis, [ ] edema [ ]Pulses  equal and intact  Nervous system:  Alert & Oriented X3, no focal deficits  Psychiatric: Normal affect  Skin: No rashes or lesions    LABS:  02-17    139  |  99  |  26[H]  ----------------------------<  131[H]  3.9   |  31  |  4.05[H]    Ca    8.8      17 Feb 2025 00:52  Phos  3.1     02-17  Mg     2.5     02-17    TPro  6.2  /  Alb  2.8[L]  /  TBili  0.4  /  DBili  x   /  AST  19  /  ALT  9[L]  /  AlkPhos  100  02-17    Creatinine Trend: 4.05<--, 2.93<--, 3.29<--, 4.44<--, 4.10<--, 6.47<--                        7.6    10.52 )-----------( 325      ( 17 Feb 2025 00:51 )             24.8     PT/INR - ( 17 Feb 2025 00:51 )   PT: 14.3 sec;   INR: 1.26 ratio         PTT - ( 17 Feb 2025 00:51 )  PTT:50.8 sec        Xray Chest 1 View- PORTABLE-Routine (Xray Chest 1 View- PORTABLE-Routine in AM.) (02.15.25 @ 07:23) >  IMPRESSION:  Tracheostomy cannula nasogastric tube reidentified in position. Low lung  volumes. Stable cardiomediastinum. Vascular congestion not significantly  changed. No pneumothorax large pleural effusion or other interval change    VA Duplex Hemodialysis Access, Left (02.14.25 @ 21:15) >  Inflow artery flow volume: 1154 cc/min  Outflow flow volume: 1669 cc/min    IMPRESSION:  Left upper extremity AV fistula is patent as above.      TTE Limited W or WO Ultrasound Enhancing Agent (02.09.25 @ 14:29) >  CONCLUSIONS:      1. Left ventricular systolic function is normal with an ejection fraction visually estimated at 55 to 60 %.   2. Enlarged right ventricular cavity size and reduced right ventricular systolic function.   3. Small pericardial effusion with no echocardiographic evidence of tamponade physiology: respiratory variation across the mitral valve E wave is not greater than 30%, respiratory variation across the tricuspid valve E wave is not greater than 60%, no diastolic collapse of right atrium, exceding1/3 of the cardiac cycle and no early diastolic inversion of the right ventricle.   4. Compared to the transthoracic echocardiogram performed on 2/3/2025, the pericardial effusion has decreased in size.

## 2025-02-17 NOTE — PROGRESS NOTE ADULT - ASSESSMENT
54M with PMH of HTN, HLD, ESRD on HD MWF via LUE AVF, ascites (requiring repeat paracenteses q4-6wks), NICM with moderate LV dysfunction w/ EF 35-40%(2023) and CAD s/p atherectomy + SALLIE to D1(4/11/2024) presents to [facility] transferred from [facility] for CABG eval  Nephro on [facility] for HD    ESRD on HD   Regular schedule MWF   Access LUE AVF  Center [facility]  Nephrologist Dr. Bailey  S/p HD on 01/29, 2/3 and 02/05  S/p HD on 02/07   s/p hd 2/10 uf 3L  S/p PUF on -2/11 and HD on 02/12  S/p HD on 02/13, 02/14, 02/15 for Hyperkalemia   HD tomorrow   Off schedule this week, will resume MWF schedule next week  Keep MAP>65  Renal Diet  Consent in physical chart    Hyper/Hypokalemia  Monitor K    HTN  bp fluctuating  monitor bp     CKD MBD  Can send a PTH  Ca and Phos daily    Anemia  CRISTIANE with HD  Transfuse if hgb <7    CAD with multivessel disease  s/p CABG 12/30  CTICU following    Hypoxic Resp failure requiring Trach  Per surgery  S/p bronchoscopy, pulm following

## 2025-02-17 NOTE — PROGRESS NOTE ADULT - SUBJECTIVE AND OBJECTIVE BOX
[facility]   MD DREW VIZCARRA MD MARIA SANTIAGO, NP    TEL:  OFFICE: [phone]  From 5pm-7am Answering Service [phone]    -- RENAL FOLLOW UP NOTE ---Date of Service 02-17-25 @ 14:38    Patient is a 54y old  Male who presents with a chief complaint of CAD s/p CABG      Patient seen and examined at bedside. Patient has trach collar in no acute distress.    VITALS:  T(F): 98.4 (02-17-25 @ 12:00), Max: 98.7 (02-17-25 @ 08:00)  HR: 64 (02-17-25 @ 13:00)  BP: 165/78 (02-17-25 @ 13:00)  RR: 18 (02-17-25 @ 13:00)  SpO2: 99% (02-17-25 @ 13:00)  Wt(kg): --    02-16 @ 07:01  -  02-17 @ 07:00  --------------------------------------------------------  IN: 1545 mL / OUT: 0 mL / NET: 1545 mL    02-17 @ 07:01  -  02-17 @ 14:38  --------------------------------------------------------  IN: 175 mL / OUT: 0 mL / NET: 175 mL          PHYSICAL EXAM:  General: NAD  Neck: No JVD  Respiratory: CTAB, no wheezes, rales or rhonchi; +trach collar  Cardiovascular: S1, S2, RRR  Gastrointestinal: BS+, soft, NT/ND  Extremities: No peripheral edema    Hospital Medications:   MEDICATIONS  (STANDING):  aMIOdarone    Tablet 200 milliGRAM(s) Oral daily  ascorbic acid 500 milliGRAM(s) Oral daily  aspirin  chewable 81 milliGRAM(s) Oral daily  atorvastatin 80 milliGRAM(s) Oral at bedtime  chlorhexidine 2% Cloths 1 Application(s) Topical daily  chlorhexidine 4% Liquid 1 Application(s) Topical <User Schedule>  dextrose 50% Injectable 50 milliLiter(s) IV Push every 15 minutes  epoetin ignacia (EPOGEN) Injectable 11690 Unit(s) IV Push <User Schedule>  ferrous    sulfate Liquid 300 milliGRAM(s) Enteral Tube daily  heparin  Infusion 500 Unit(s)/Hr (12.5 mL/Hr) IV Continuous <Continuous>  insulin lispro (ADMELOG) corrective regimen sliding scale   SubCutaneous every 6 hours  melatonin 5 milliGRAM(s) Oral at bedtime  methocarbamol 500 milliGRAM(s) Oral every 8 hours  metoprolol tartrate 12.5 milliGRAM(s) Oral <User Schedule>  mirtazapine 7.5 milliGRAM(s) Oral at bedtime  Nephro-elicia 1 Tablet(s) Oral daily  pantoprazole  Injectable 40 milliGRAM(s) IV Push daily  sodium chloride 0.65% Nasal 1 Spray(s) Both Nostrils every 12 hours  sodium chloride 0.9%. 1000 milliLiter(s) (10 mL/Hr) IV Continuous <Continuous>  sodium zirconium cyclosilicate 10 Gram(s) Oral <User Schedule>  traZODone 50 milliGRAM(s) Oral at bedtime      LABS:  02-17    139  |  99  |  26[H]  ----------------------------<  131[H]  3.9   |  31  |  4.05[H]    Ca    8.8      17 Feb 2025 00:52  Phos  3.1     02-17  Mg     2.5     02-17    TPro  6.2  /  Alb  2.8[L]  /  TBili  0.4  /  DBili      /  AST  19  /  ALT  9[L]  /  AlkPhos  100  02-17    Creatinine Trend: 4.05 <--, 2.93 <--, 3.29 <--, 4.44 <--, 4.10 <--, 6.47 <--, 6.04 <--, 5.88 <--, 5.80 <--, 5.62 <--, 5.48 <--, 4.69 <--    Albumin: 2.8 g/dL (02-17 @ 00:52)  Phosphorus: 3.1 mg/dL (02-17 @ 00:52)                              7.6    10.52 )-----------( 325      ( 17 Feb 2025 00:51 )             24.8     Urine Studies:  Urinalysis - [02-17-25 @ 00:52]      Color  / Appearance  / SG  / pH       Gluc 131 / Ketone   / Bili  / Urobili        Blood  / Protein  / Leuk Est  / Nitrite       RBC  / WBC  / Hyaline  / Gran  / Sq Epi  / Non Sq Epi  / Bacteria       Iron 29, TIBC 143, %sat 20      [12-19-24 @ 05:00]  Ferritin 904      [12-19-24 @ 05:00]  TSH 4.75      [12-24-24 @ 06:50]        RADIOLOGY & ADDITIONAL STUDIES:

## 2025-02-17 NOTE — PROGRESS NOTE ADULT - SUBJECTIVE AND OBJECTIVE BOX
Patient seen and examined at the bedside.    Remains critically ill on continuous ICU monitoring.      Brief Summary:  55 yo M with multiple medical problems including CAD s/p PCI in 2024 s/p CABG x 3 on 24: Intubated for hypoxia & left lung white out s/p awake bronch with removal of copious mucopurulent secretions  : s/p IABP insertion, sepsis/septic shock due to E coli   ESBL pneumonia, collapsed Left Lung, s/p multiple bronch    tracheostomy tube placement    VRE E. Faecium Bcx   +HSV PCR BAL   tissue cx from back abscess - Serratia   - - intermittent bradycardia/junctional episodes with hypotension  2/3: off , off theophylline. iHD 1L UF  : bronch for Left lung collapse wound vac, 2decho w/ moderate pericardial effusion - similar as before, US abd: small - moderate ascites - monitor at this time.  Wound vac placed for sacral wounds  : iHD-1L UF  : bronch today off positive pressure. : MRSA and serratia from cyst on the back. Plan for Levaquin + Vanc x 5 days      24 Hour events:  On TC X 48 hours - ambulating well, no mucus plugging events          Objective:  Vital Signs Last 24 Hrs  T(C): 36.9 (2025 00:00), Max: 37 (2025 12:00)  T(F): 98.4 (2025 00:00), Max: 98.6 (2025 12:00)  HR: 62 (2025 06:00) (61 - 105)  BP: 178/80 (2025 06:00) (103/54 - 178/80)  BP(mean): 115 (2025 06:00) (75 - 115)  RR: 15 (2025 06:00) (12 - 43)  SpO2: 100% (2025 06:00) (95% - 100%)    Parameters below as of 2025 04:00  Patient On (Oxygen Delivery Method): nasal cannula, high flow  O2 Flow (L/min): 30  O2 Concentration (%): 40                Physical Exam:   General: Alert and interactive  Neurology: Oriented, following commands  Respiratory: Clear bilaterally, Trach site ok.  CV: A fib  Abdominal: Soft, Nontender  Extremities: Warm, well-perfused  -------------------------------------------------------------------------------------------------------------------------------    Labs:                        7.6    10.52 )-----------( 325      ( 2025 00:51 )             24.8     -    139  |  99  |  26[H]  ----------------------------<  131[H]  3.9   |  31  |  4.05[H]    Ca    8.8      2025 00:52  Phos  3.1       Mg     2.5         TPro  6.2  /  Alb  2.8[L]  /  TBili  0.4  /  DBili  x   /  AST  19  /  ALT  9[L]  /  AlkPhos  100  17    LIVER FUNCTIONS - ( 2025 00:52 )  Alb: 2.8 g/dL / Pro: 6.2 g/dL / ALK PHOS: 100 U/L / ALT: 9 U/L / AST: 19 U/L / GGT: x           PT/INR - ( 2025 00:51 )   PT: 14.3 sec;   INR: 1.26 ratio         PTT - ( 2025 00:51 )  PTT:50.8 sec    ------------------------------------------------------------------------------------------------------------------------------  Assessment:  55 yo M s/p CABG x 3 on 24    Postop acute respiratory failure  Acute blood loss anemia  ESRD on iHD       Plan:  ***Neuro***  Postoperative acute pain control with Robaxin and Tylenol  Trazodone, Remeron, and Melatonin nightly  PT ongoing    ***Cardiovascular***  s/p CABG x 3  A fib - Tolerating beta blocker, PO Amio for Afib  ASA / Statin daily  TTE today to reassess effusion.    ***Pulmonary***  Postoperative acute respiratory insufficiency- HFNC   Tracheostomy    Mucomyst, normal saline nebs, Xopenex to help mobilize secretions  Left lung PNA, + e coli ESBL - cleared but now with VRE   Trach collar trials ongoing.    ***GI***  Tolerating Tube feeds at goal  Protonix for stress ulcer prophylaxis.   Bowel regimen    ***Renal***  ESRD - tolerating iHD - next HD tomorrow.  Nephro-Lisette for renal support    ***ID***  Monitor cultures   BAL - commensal manjit    VRE BAL - Linezolid    Acyclovir for + HSV in BAL   Serratia on tissue cx and h/o ESBL PNA - Meropenem remains on   : MRSA and serratia from cyst on the back. Levaquin + Vanco, but now off Levaquin.      ***Endocrine***  Hyperglycemia - Insulin sliding scale    ***Hematology***  Acute blood loss anemia - no transfusion indication currently.  CBC, coags  Continue Epogen.  Heparin SQ for DVT  No AC s/p cardioversion. Not on AC due to moderate pericardial effusion    Wound:  s/p sacral debridement on  . wound vac placed on  and changed three times a week    *** Lines ***  RUE Midline     Care plan discussed with the ICU care team.   Patient remains critical, at risk for life threatening decompensation.    I have spent 30 minutes providing critical care management to this patient.    By signing my name below, I, Sonia Sánchez, attest that this documentation has been prepared under the direction and in the presence of Blaze Finch MD.  Electronically signed: Sonia Sánchez, 25 @ 08:36    I, Blaze Finch MD,  personally performed the services described in this documentation. all medical record entries made by the scribe were at my direction and in my presence. I have reviewed the chart and agree that the record reflects my personal performance and is accurate and complete  Electronically signed: Blaze Finch MD. Patient seen and examined at the bedside.    Remains critically ill on continuous ICU monitoring.      Brief Summary:  55 yo M with multiple medical problems including CAD s/p PCI in 2024 s/p CABG x 3 on 24: Intubated for hypoxia & left lung white out s/p awake bronch with removal of copious mucopurulent secretions  : s/p IABP insertion, sepsis/septic shock due to E coli   ESBL pneumonia, collapsed Left Lung, s/p multiple bronch    tracheostomy tube placement    VRE E. Faecium Bcx   +HSV PCR BAL   tissue cx from back abscess - Serratia   - - intermittent bradycardia/junctional episodes with hypotension  2/3: off , off theophylline. iHD 1L UF  : bronch for Left lung collapse wound vac, 2decho w/ moderate pericardial effusion - similar as before, US abd: small - moderate ascites - monitor at this time.  Wound vac placed for sacral wounds  : iHD-1L UF  : bronch today off positive pressure. : MRSA and serratia from cyst on the back. Plan for Levaquin + Vanc x 5 days   : concern for left food drop   2/10 : no acute issues - CXR shows improving left sided aeration, pt subjectively reports having difficulty while lying flat  : s/p Paracentesis 3.5 L removed, leukocytosis   : Ascites fluid PMN < 250 (less likely SBP)   sniff test yesterday showed paradoxical diaphragmatic motion    : mucus plugging but able to clear airway with aggressive pulmonary toileting.   : no vent requirement overnight, able to mobilize secretion with pulm toileting  hyperkalemia post HD - workup for possible recirculation underway   : On TC X 48 hours - ambulating well, no mucus plugging events    24 Hour events:  On TC ambulating well  CXR with recurrent opacification this morning         Objective:  Vital Signs Last 24 Hrs  T(C): 36.9 (2025 00:00), Max: 37 (2025 12:00)  T(F): 98.4 (2025 00:00), Max: 98.6 (2025 12:00)  HR: 62 (2025 06:00) (61 - 105)  BP: 178/80 (2025 06:00) (103/54 - 178/80)  BP(mean): 115 (2025 06:00) (75 - 115)  RR: 15 (2025 06:00) (12 - 43)  SpO2: 100% (2025 06:00) (95% - 100%)    Parameters below as of 2025 04:00  Patient On (Oxygen Delivery Method): nasal cannula, high flow  O2 Flow (L/min): 30  O2 Concentration (%): 40                Physical Exam:   General: Alert and interactive  Neurology: Oriented, following commands  Respiratory: Clear bilaterally, Trach site ok.  CV: A fib  Abdominal: Soft, Nontender  Extremities: Warm, well-perfused  -------------------------------------------------------------------------------------------------------------------------------    Labs:                        7.6    10.52 )-----------( 325      ( 2025 00:51 )             24.8     02-    139  |  99  |  26[H]  ----------------------------<  131[H]  3.9   |  31  |  4.05[H]    Ca    8.8      2025 00:52  Phos  3.1     -  Mg     2.5     -17    TPro  6.2  /  Alb  2.8[L]  /  TBili  0.4  /  DBili  x   /  AST  19  /  ALT  9[L]  /  AlkPhos  100  02-17    LIVER FUNCTIONS - ( 2025 00:52 )  Alb: 2.8 g/dL / Pro: 6.2 g/dL / ALK PHOS: 100 U/L / ALT: 9 U/L / AST: 19 U/L / GGT: x           PT/INR - ( 2025 00:51 )   PT: 14.3 sec;   INR: 1.26 ratio         PTT - ( 2025 00:51 )  PTT:50.8 sec    ------------------------------------------------------------------------------------------------------------------------------  Assessment:  55 yo M s/p CABG x 3 on 24    Postop acute respiratory failure  Acute blood loss anemia  ESRD on iHD   hx of ESBL   hx of VRE  Serratia infection  MSRA infection   sacral decub     Plan:  ***Neuro***  Postoperative acute pain control with Robaxin and Tylenol  Trazodone, Remeron, and Melatonin nightly  PT ongoing    ***Cardiovascular***  s/p CABG x 3  A fib - Tolerating beta blocker, PO Amio for Afib  ASA / Statin daily  TTE  ECHO  LV EF nl, RV remains dilated and hypokinetic (chronic)    ***Pulmonary***  Postoperative acute respiratory insufficiency- HFTC   Tracheostomy    Mucomyst, normal saline nebs, Xopenex to help mobilize secretions  Left lung PNA, + e coli ESBL - cleared but now with VRE   Trach collar trials ongoing.  Being evaluated for possible left diaphragm plication  - since he is clinically improving will continue current conservative care for now  - thoracic team following  Needs aggressive pulm toileting   Empiric antibiotics for Tracheobronchitis   Plan for trach downsizing (size 7 cuffless trach tmr)     ***GI***  Tolerating Tube feeds at goal  Protonix for stress ulcer prophylaxis.   Bowel regimen    ***Renal***  ESRD - tolerating iHD - plan for iHD today .  Nephro-Lisette for renal support    ***ID***  Monitor cultures   BAL - commensal manjit    VRE BAL - Linezolid    Acyclovir for + HSV in BAL   Serratia on tissue cx and h/o ESBL PNA - Meropenem remains on   : MRSA and serratia from cyst on the back. Levaquin + Vanco, but now off Levaquin.    ***Endocrine***  Hyperglycemia - Insulin sliding scale    ***Hematology***  Acute blood loss anemia - no transfusion indication currently.  CBC, coags  Continue Epogen.  Heparin infusion (AF)     Wound:  s/p sacral debridement on  . wound vac placed on  and changed three times a week    *** Lines ***  RUE Midline     Care plan discussed with the ICU care team.   Patient remains critical, at risk for life threatening decompensation.    I have spent 45 minutes providing critical care management to this patient.    By signing my name below, I, Sonia Sánchez, attest that this documentation has been prepared under the direction and in the presence of Blaze Finch MD.  Electronically signed: Sonia Sánchez, 25 @ 08:36    I, Blaze Finch MD,  personally performed the services described in this documentation. all medical record entries made by the scribe were at my direction and in my presence. I have reviewed the chart and agree that the record reflects my personal performance and is accurate and complete  Electronically signed: Blaze Finch MD.

## 2025-02-18 ENCOUNTER — RESULT REVIEW (OUTPATIENT)
Age: 55
End: 2025-02-18

## 2025-02-18 LAB
ADD ON TEST-SPECIMEN IN LAB: SIGNIFICANT CHANGE UP
ALBUMIN SERPL ELPH-MCNC: 2.9 G/DL — LOW (ref 3.3–5)
ALP SERPL-CCNC: 102 U/L — SIGNIFICANT CHANGE UP (ref 40–120)
ALT FLD-CCNC: 11 U/L — SIGNIFICANT CHANGE UP (ref 10–45)
ANION GAP SERPL CALC-SCNC: 13 MMOL/L — SIGNIFICANT CHANGE UP (ref 5–17)
APTT BLD: 168.2 SEC — CRITICAL HIGH (ref 24.5–35.6)
APTT BLD: 51.1 SEC — HIGH (ref 24.5–35.6)
APTT BLD: 55.4 SEC — HIGH (ref 24.5–35.6)
AST SERPL-CCNC: 18 U/L — SIGNIFICANT CHANGE UP (ref 10–40)
BASOPHILS # BLD AUTO: 0.07 K/UL — SIGNIFICANT CHANGE UP (ref 0–0.2)
BASOPHILS NFR BLD AUTO: 0.6 % — SIGNIFICANT CHANGE UP (ref 0–2)
BILIRUB SERPL-MCNC: 0.4 MG/DL — SIGNIFICANT CHANGE UP (ref 0.2–1.2)
BLD GP AB SCN SERPL QL: NEGATIVE — SIGNIFICANT CHANGE UP
BUN SERPL-MCNC: 33 MG/DL — HIGH (ref 7–23)
CALCIUM SERPL-MCNC: 9 MG/DL — SIGNIFICANT CHANGE UP (ref 8.4–10.5)
CHLORIDE SERPL-SCNC: 97 MMOL/L — SIGNIFICANT CHANGE UP (ref 96–108)
CO2 SERPL-SCNC: 26 MMOL/L — SIGNIFICANT CHANGE UP (ref 22–31)
CREAT SERPL-MCNC: 5.01 MG/DL — HIGH (ref 0.5–1.3)
EGFR: 13 ML/MIN/1.73M2 — LOW
EGFR: 13 ML/MIN/1.73M2 — LOW
EOSINOPHIL # BLD AUTO: 0.84 K/UL — HIGH (ref 0–0.5)
EOSINOPHIL NFR BLD AUTO: 7.5 % — HIGH (ref 0–6)
FIBRINOGEN PPP-MCNC: 259 MG/DL — SIGNIFICANT CHANGE UP (ref 200–445)
GLUCOSE SERPL-MCNC: 105 MG/DL — HIGH (ref 70–99)
GRAM STN FLD: ABNORMAL
HCT VFR BLD CALC: 26.3 % — LOW (ref 39–50)
HGB BLD-MCNC: 8.4 G/DL — LOW (ref 13–17)
IMM GRANULOCYTES NFR BLD AUTO: 1.1 % — HIGH (ref 0–0.9)
INR BLD: 1.23 RATIO — HIGH (ref 0.85–1.16)
LYMPHOCYTES # BLD AUTO: 0.73 K/UL — LOW (ref 1–3.3)
LYMPHOCYTES # BLD AUTO: 6.6 % — LOW (ref 13–44)
MAGNESIUM SERPL-MCNC: 2.6 MG/DL — SIGNIFICANT CHANGE UP (ref 1.6–2.6)
MCHC RBC-ENTMCNC: 31.5 PG — SIGNIFICANT CHANGE UP (ref 27–34)
MCHC RBC-ENTMCNC: 31.9 G/DL — LOW (ref 32–36)
MCV RBC AUTO: 98.5 FL — SIGNIFICANT CHANGE UP (ref 80–100)
MONOCYTES # BLD AUTO: 0.85 K/UL — SIGNIFICANT CHANGE UP (ref 0–0.9)
MONOCYTES NFR BLD AUTO: 7.6 % — SIGNIFICANT CHANGE UP (ref 2–14)
NEUTROPHILS # BLD AUTO: 8.52 K/UL — HIGH (ref 1.8–7.4)
NEUTROPHILS NFR BLD AUTO: 76.6 % — SIGNIFICANT CHANGE UP (ref 43–77)
NRBC BLD AUTO-RTO: 0 /100 WBCS — SIGNIFICANT CHANGE UP (ref 0–0)
PLATELET # BLD AUTO: 345 K/UL — SIGNIFICANT CHANGE UP (ref 150–400)
POTASSIUM SERPL-MCNC: 4.3 MMOL/L — SIGNIFICANT CHANGE UP (ref 3.5–5.3)
POTASSIUM SERPL-SCNC: 4.3 MMOL/L — SIGNIFICANT CHANGE UP (ref 3.5–5.3)
PROT SERPL-MCNC: 6.4 G/DL — SIGNIFICANT CHANGE UP (ref 6–8.3)
PROTHROM AB SERPL-ACNC: 14.1 SEC — HIGH (ref 9.9–13.4)
RBC # BLD: 2.67 M/UL — LOW (ref 4.2–5.8)
RBC # FLD: 21.7 % — HIGH (ref 10.3–14.5)
RH IG SCN BLD-IMP: POSITIVE — SIGNIFICANT CHANGE UP
SODIUM SERPL-SCNC: 136 MMOL/L — SIGNIFICANT CHANGE UP (ref 135–145)
SPECIMEN SOURCE: SIGNIFICANT CHANGE UP
WBC # BLD: 11.13 K/UL — HIGH (ref 3.8–10.5)
WBC # FLD AUTO: 11.13 K/UL — HIGH (ref 3.8–10.5)

## 2025-02-18 PROCEDURE — 99152 MOD SED SAME PHYS/QHP 5/>YRS: CPT

## 2025-02-18 PROCEDURE — 76705 ECHO EXAM OF ABDOMEN: CPT | Mod: 26

## 2025-02-18 PROCEDURE — 99291 CRITICAL CARE FIRST HOUR: CPT | Mod: 25

## 2025-02-18 PROCEDURE — 93308 TTE F-UP OR LMTD: CPT | Mod: 26

## 2025-02-18 PROCEDURE — 71045 X-RAY EXAM CHEST 1 VIEW: CPT | Mod: 26,76

## 2025-02-18 PROCEDURE — 31624 DX BRONCHOSCOPE/LAVAGE: CPT

## 2025-02-18 PROCEDURE — G0545: CPT

## 2025-02-18 PROCEDURE — 99232 SBSQ HOSP IP/OBS MODERATE 35: CPT

## 2025-02-18 RX ORDER — KETAMINE HCL IN 0.9 % NACL 50 MG/5 ML
50 SYRINGE (ML) INTRAVENOUS ONCE
Refills: 0 | Status: DISCONTINUED | OUTPATIENT
Start: 2025-02-18 | End: 2025-02-18

## 2025-02-18 RX ORDER — METOPROLOL SUCCINATE 50 MG/1
12.5 TABLET, EXTENDED RELEASE ORAL ONCE
Refills: 0 | Status: COMPLETED | OUTPATIENT
Start: 2025-02-18 | End: 2025-02-18

## 2025-02-18 RX ORDER — LIDOCAINE HCL/PF 10 MG/ML
10 VIAL (ML) INJECTION ONCE
Refills: 0 | Status: COMPLETED | OUTPATIENT
Start: 2025-02-18 | End: 2025-02-18

## 2025-02-18 RX ORDER — METOPROLOL SUCCINATE 50 MG/1
25 TABLET, EXTENDED RELEASE ORAL
Refills: 0 | Status: DISCONTINUED | OUTPATIENT
Start: 2025-02-18 | End: 2025-03-19

## 2025-02-18 RX ADMIN — METHOCARBAMOL 500 MILLIGRAM(S): 500 TABLET, FILM COATED ORAL at 05:56

## 2025-02-18 RX ADMIN — Medication 300 MILLIGRAM(S): at 11:11

## 2025-02-18 RX ADMIN — Medication 1 APPLICATION(S): at 21:29

## 2025-02-18 RX ADMIN — MIRTAZAPINE 7.5 MILLIGRAM(S): 30 TABLET, FILM COATED ORAL at 21:39

## 2025-02-18 RX ADMIN — Medication 50 MILLIGRAM(S): at 09:24

## 2025-02-18 RX ADMIN — HEPARIN SODIUM 12.5 UNIT(S)/HR: 1000 INJECTION INTRAVENOUS; SUBCUTANEOUS at 21:41

## 2025-02-18 RX ADMIN — Medication 500 MILLIGRAM(S): at 11:11

## 2025-02-18 RX ADMIN — METHOCARBAMOL 500 MILLIGRAM(S): 500 TABLET, FILM COATED ORAL at 21:39

## 2025-02-18 RX ADMIN — Medication 5 MILLIGRAM(S): at 21:39

## 2025-02-18 RX ADMIN — Medication 10 MILLILITER(S): at 09:47

## 2025-02-18 RX ADMIN — Medication 40 MILLIGRAM(S): at 11:11

## 2025-02-18 RX ADMIN — METOPROLOL SUCCINATE 12.5 MILLIGRAM(S): 50 TABLET, EXTENDED RELEASE ORAL at 10:50

## 2025-02-18 RX ADMIN — ATORVASTATIN CALCIUM 80 MILLIGRAM(S): 80 TABLET, FILM COATED ORAL at 21:39

## 2025-02-18 RX ADMIN — Medication 15 MILLILITER(S): at 17:05

## 2025-02-18 RX ADMIN — HEPARIN SODIUM 12.5 UNIT(S)/HR: 1000 INJECTION INTRAVENOUS; SUBCUTANEOUS at 07:41

## 2025-02-18 RX ADMIN — Medication 1 TABLET(S): at 11:11

## 2025-02-18 RX ADMIN — AMIODARONE HYDROCHLORIDE 200 MILLIGRAM(S): 50 INJECTION, SOLUTION INTRAVENOUS at 05:56

## 2025-02-18 RX ADMIN — Medication 50 MILLIGRAM(S): at 21:39

## 2025-02-18 RX ADMIN — METHOCARBAMOL 500 MILLIGRAM(S): 500 TABLET, FILM COATED ORAL at 16:00

## 2025-02-18 RX ADMIN — METOPROLOL SUCCINATE 12.5 MILLIGRAM(S): 50 TABLET, EXTENDED RELEASE ORAL at 07:54

## 2025-02-18 RX ADMIN — Medication 81 MILLIGRAM(S): at 11:10

## 2025-02-18 RX ADMIN — METOPROLOL SUCCINATE 25 MILLIGRAM(S): 50 TABLET, EXTENDED RELEASE ORAL at 20:24

## 2025-02-18 NOTE — PROGRESS NOTE ADULT - ASSESSMENT
54M with PMH of HTN, HLD, ESRD on HD MWF via LUE AVF, ascites (requiring repeat paracenteses q4-6wks), NICM with moderate LV dysfunction w/ EF 35-40%() and CAD s/p atherectomy + SALLIE to D1(2024) presents to [facility] transferred from [facility]. Patient initially presented to [facility] ED with shortness of breath. Of note he was recently admitted from - for acute cholecystitis s/p cholecystectomy. In [facility] ED, pt was hypoxic to 86% on RA, trop 1.7K, EKG no new changes, CXR fluid overload. Admitted for AHRF 2/2 fluid overload. Pt received HD on , ,  and . DAPT was resumed, TTE showed improvement in LVEF to 40-45%. Stress test was abnormal with 1mm inferior STD, reversible ischemia in the inferior wall and fixed defects in the lateral, anterior and apical walls with post stress EF of 24%.     Pt was then transferred to [facility] for cardiac cath which showed pLAD 70% ISR, mLCx 70%, D1 severe dz. Patient now transferred to [facility] CTS Dr. Rivers for CABG eval.# CAD s/p NSTEMI  Hx CAD s/p PCI LAD   Presented with ADHF elevated troponins  Positive NST1-Cath- pLAD 70% ISR, mLCx 70%, D1 severe dz.   TTE EF 35% Severe TRWas on Plavix-p2y12- 321 been off Plavix since -POD#1-C3L free LIMA-LAD; SVG-Diag; XZN-szsoIA-Bsqxqkjun on  Sinus rhythm,Amio for AF prophylaxis  -OOB off  Sinus rhythm   -POD#3-On /pressors-EF 25-30% RV failure with transaminitis-Sinus Kyawuy64/03-POD#4-Was intubated for hypoxia-gradual hypotension on /pressors had IABP inserted this morning  -POD#5-s/p IABP insertion, sepsis/septic shock due to GNR/ECOLI HD cath placed   Bronched yesterday 1/3 - minimal secretions with rust-tinged sputum   ESBL-E Coli bacteremia on meropenam  - POD#7 Atrial Fib ,remains intubated weaning IABP 1:3,off pressors on CRRT  -IABP removed.Remains on vent-Alex 10ppm,off Levo- CVP 10 / MAP 71 / Hct 25.6% / Lactate 1.7-Atrial Fibrillation  -Intubated on Alex 10ppm, gtt, BP better-AF~~90's on Amio-had bronch still febrile Tmax 68219/-Extubated awake alert on HFNC AF,on Dobutrex-CRRT,Cultures no growth  01/10-OOB on BiPAP Sinus Rhythm on -SR/ MAP 57/ CVP 10/  Hct 26.7 / Lact 1.4  -s/p EDU/DCCV-Sinus rhythm on -SR / MAP 52 / CVP 12/ Hct 25.8 / Lact 0.7-had bronch with BAL Left lung  -Sinus rhythm on -for repeat Bronch-SR / MAP 67 / CVP 11 / Hct 27.4% / Lact 0.8  -s/p Trach on  TTE EF 55%-SR / CVP 2 / MAP 63 / Hct 25.9% / Lactate 0.9  -Awake on Trach collar off  c/o buttock pain had bronch yesterday-BAL-Sinus rhythm  -Sinus rhythm on vent at night-BP stable may need to increase hydrallazine 25 mg q8  -TTE EF 60% still needs Vent support at night Bradycardic trial of Bryce now back on   -Awake alert Sinus rhythm BP elevated  remains on ,Nocturnal vent support  resume Hydralazine 25mg q8  -Reverted to AF rvr  -s/p BRonc/BAL debridement sacral decub  -Awake noted AF RVR no further bradyarrhythmias-Started BBlockers  02/10-Persistent AF tolerating Amio BBlockers Pericardial Effusion decreased consider resuming AC  -Seen by Thoracic-? Diaphragmatic plication,AF on HD  -Sniff test +  02/15-Awake AF,   -Tolerating TC-remains in AF/AFl is ambulating  -On HFTC ambulating AFl ~~80;s on heparin gtt        # Acute on Chronic Systolic Heart Failure now improved  EF-35% ,severe TR  Had HD post op  GDMT post op  LVEF improved post bypass   -TTE EF 40%,trace effusion  ECG c/w pericarditis-was on colchicine   01/15-TTE EF 55%  -TTE EF 60%   -Normal LV systolic function Moderate pericardial effusion  -On TC-HF and Vent support at nights   02/10-Vent HS with T Collar trial Ambulated Remains in AF  Repeat TTE Normal LV Systolic function Small Pericardial effusion decreased from 2/3        Vascular evaluated  AVGraft /?steal causing RV failure-'' Very low suspicion of steal Sd and AVF causing current clinical state. ''   VA Duplex Hemodialysis Access, Left (25 @ 13:35) >   Arterial flow volume of 1301 mL/m, and the venous flow volume of  1492 mL/m are consistent with a normally functioning dialysis access  fistula.      # Ascites  Hx chronic ascites  Has drainage q4-6 weeks  Last drained -4600mL  ? ascites related to severe TR  Had ccftbevflmzn-Gefnywu-vd growth   CT Abdomen 01/10-Large volume ascites-  US abdomen--Small to moderate volume abdominal/pelvic ascites      # ESRD  Now off CRRT transition to HD

## 2025-02-18 NOTE — PROCEDURE NOTE - NSBRONCHPROCDETAILS_GEN_A_CORE_FT
Indication: Chest XR findings, L sided lung field       Findings:  Bronchoscope inserted through trach. Trach noted to be in good position. Airway evaluation revealed Sharp Abena. AUGUSTO and LLL evaluation revealed thick yellow/brown secretions, moderate amount. RUL, RML, RLL revealed minimal amount of thin secretions. Bronchoscope then withdrawn from ETT. Minimal bleeding noted. BAL specimen sent.

## 2025-02-18 NOTE — PROCEDURE NOTE - NSPRE-BRON/TUBRISKASSES_GEN_ALL_CORE
I evaluated the patient prior to bronchoscopy procedure for active pulmonary/laryngeal M. tuberculosis disease and the risk and actions taken:    Low risk with routine standard of care measures followed.

## 2025-02-18 NOTE — PROGRESS NOTE ADULT - SUBJECTIVE AND OBJECTIVE BOX
Patient seen and examined at the bedside.    Remains critically ill on continuous ICU monitoring.      Brief Summary:  55 yo M with multiple medical problems including CAD s/p PCI in 2024 s/p CABG x 3 on 24: Intubated for hypoxia & left lung white out s/p awake bronch with removal of copious mucopurulent secretions  : s/p IABP insertion, sepsis/septic shock due to E coli   ESBL pneumonia, collapsed Left Lung, s/p multiple bronch    tracheostomy tube placement    VRE E. Faecium Bcx   +HSV PCR BAL   tissue cx from back abscess - Serratia   - - intermittent bradycardia/junctional episodes with hypotension  2/3: off , off theophylline. iHD 1L UF  : bronch for Left lung collapse wound vac, 2decho w/ moderate pericardial effusion - similar as before, US abd: small - moderate ascites - monitor at this time.  Wound vac placed for sacral wounds  : iHD-1L UF  : bronch today off positive pressure. : MRSA and serratia from cyst on the back. Plan for Levaquin + Vanc x 5 days   : concern for left food drop   2/10 : no acute issues - CXR shows improving left sided aeration, pt subjectively reports having difficulty while lying flat  : s/p Paracentesis 3.5 L removed, leukocytosis   : Ascites fluid PMN < 250 (less likely SBP)   sniff test yesterday showed paradoxical diaphragmatic motion    : mucus plugging but able to clear airway with aggressive pulmonary toileting.   : no vent requirement overnight, able to mobilize secretion with pulm toileting  hyperkalemia post HD - workup for possible recirculation underway   : On TC X 48 hours - ambulating well, no mucus plugging events    24 Hour events:  On TC ambulating well - plan for downsize to size 7 today  Remains in A flutter    Objective:  Vital Signs Last 24 Hrs  T(C): 36.5 (2025 04:00), Max: 37.1 (2025 08:00)  T(F): 97.7 (2025 04:00), Max: 98.7 (2025 08:00)  HR: 109 (2025 06:00) (62 - 109)  BP: 111/62 (2025 06:00) (111/62 - 175/80)  BP(mean): 81 (2025 06:00) (81 - 125)  RR: 32 (2025 06:00) (11 - 32)  SpO2: 95% (2025 06:00) (91% - 99%)    Parameters below as of 2025 04:00  Patient On (Oxygen Delivery Method): [facility]  O2 Flow (L/min): 30  O2 Concentration (%): 40    Physical Exam:  General: Alert and interactive  Neurology: Oriented, following commands  Respiratory: Clear bilaterally, Trach site ok.  CV: A flutter  Abdominal: Soft, Nontender  Extremities: Warm, well-perfused  -------------------------------------------------------------------------------------------------------------------------------    Labs:                        8.4    11.13 )-----------( 345      ( 2025 00:28 )             26.3     02-18    136  |  97  |  33[H]  ----------------------------<  105[H]  4.3   |  26  |  5.01[H]    Ca    9.0      2025 00:29  Phos  3.8     02-18  Mg     2.6     -18    TPro  6.4  /  Alb  2.9[L]  /  TBili  0.4  /  DBili  x   /  AST  18  /  ALT  11  /  AlkPhos  102  02-18    LIVER FUNCTIONS - ( 2025 00:29 )  Alb: 2.9 g/dL / Pro: 6.4 g/dL / ALK PHOS: 102 U/L / ALT: 11 U/L / AST: 18 U/L / GGT: x           PT/INR - ( 2025 04:27 )   PT: 14.1 sec;   INR: 1.23 ratio       PTT - ( 2025 04:27 )  PTT:51.1 sec  ------------------------------------------------------------------------------------------------------------------------------  Assessment:  55 yo M s/p CABG x 3 on 24    Postop acute respiratory failure  Acute blood loss anemia  ESRD on iHD   hx of ESBL   hx of VRE  Serratia infection  MSRA infection   sacral decub       Plan:  ***Neuro***  Postoperative acute pain control with Robaxin and Tylenol  Trazodone, Remeron, and Melatonin nightly  PT ongoing    ***Cardiovascular***  s/p CABG x 3  A flutter - Tolerating beta blocker, PO Amio for Afib  ASA / Statin daily  TTE  ECHO  LV EF nl, RV remains dilated and hypokinetic (chronic)    ***Pulmonary***  Postoperative acute respiratory insufficiency - HFTC   Tracheostomy    Mucomyst, normal saline nebs, Xopenex to help mobilize secretions  Left lung PNA, + e coli ESBL - cleared but now with VRE   Trach collar trials ongoing.  Being evaluated for possible left diaphragm plication  - since he is clinically improving will continue current conservative care for now  - thoracic team following  Needs aggressive pulm toileting   Empiric antibiotics for Tracheobronchitis   Plan for trach downsizing (size 7 cuffless trach today)     ***GI***  Tolerating Tube feeds at goal  Protonix for stress ulcer prophylaxis.   Bowel regimen    ***Renal***  ESRD - tolerating iHD - plan for iHD tmr .  Nephro-Lisette for renal support    ***ID***  Monitor cultures   BAL - commensal manjit    VRE BAL - Linezolid    Acyclovir for + HSV in BAL   Serratia on tissue cx and h/o ESBL PNA - Meropenem remains on   : MRSA and serratia from cyst on the back. Levaquin + Vanco, but now off Levaquin.    ***Endocrine***  Hyperglycemia - ISS    ***Hematology***  Acute blood loss anemia - no transfusion indication currently.  CBC, coags  Continue Epogen.  Heparin infusion (AF)     Wound:  s/p sacral debridement on  . wound vac placed on  and changed three times a week    *** Lines ***  RUE Midline         Care plan discussed with the ICU care team.   Patient remains critical, at risk for life threatening decompensation.    I have spent 30 minutes providing critical care management to this patient.    By signing my name below, I, Baldemar Hernandez, attest that this documentation has been prepared under the direction and in the presence of Blaze Finch MD.  Electronically signed: Evelio Sevilla 25 @ 06:40    I, Blaze Finch MD,  personally performed the services described in this documentation. All medical record entries made by the scribe were at my direction and in my presence. I have reviewed the chart and agree that the record reflects my personal performance and is accurate and complete  Electronically signed: Blaze Finch MD. Patient seen and examined at the bedside.    Remains critically ill on continuous ICU monitoring.      Brief Summary:  55 yo M with multiple medical problems including CAD s/p PCI in 2024 s/p CABG x 3 on 24: Intubated for hypoxia & left lung white out s/p awake bronch with removal of copious mucopurulent secretions  : s/p IABP insertion, sepsis/septic shock due to E coli   ESBL pneumonia, collapsed Left Lung, s/p multiple bronch    tracheostomy tube placement    VRE E. Faecium Bcx   +HSV PCR BAL   tissue cx from back abscess - Serratia   - - intermittent bradycardia/junctional episodes with hypotension  2/3: off , off theophylline. iHD 1L UF  : bronch for Left lung collapse wound vac, 2decho w/ moderate pericardial effusion - similar as before, US abd: small - moderate ascites - monitor at this time.  Wound vac placed for sacral wounds  : iHD-1L UF  : bronch today off positive pressure. : MRSA and serratia from cyst on the back. Plan for Levaquin + Vanc x 5 days   : concern for left food drop   2/10 : no acute issues - CXR shows improving left sided aeration, pt subjectively reports having difficulty while lying flat  : s/p Paracentesis 3.5 L removed, leukocytosis   : Ascites fluid PMN < 250 (less likely SBP)   sniff test yesterday showed paradoxical diaphragmatic motion    : mucus plugging but able to clear airway with aggressive pulmonary toileting.   : no vent requirement overnight, able to mobilize secretion with pulm toileting  hyperkalemia post HD - workup for possible recirculation underway   : On TC X 48 hours - ambulating well, no mucus plugging events    24 Hour events:  On TC ambulating well  Remains in A flutter  overnight HFTC, TC during the day     Objective:  Vital Signs Last 24 Hrs  T(C): 36.5 (2025 04:00), Max: 37.1 (2025 08:00)  T(F): 97.7 (2025 04:00), Max: 98.7 (2025 08:00)  HR: 109 (2025 06:00) (62 - 109)  BP: 111/62 (2025 06:00) (111/62 - 175/80)  BP(mean): 81 (2025 06:00) (81 - 125)  RR: 32 (2025 06:00) (11 - 32)  SpO2: 95% (2025 06:00) (91% - 99%)    Parameters below as of 2025 04:00  Patient On (Oxygen Delivery Method): [facility]  O2 Flow (L/min): 30  O2 Concentration (%): 40    Physical Exam:  General: Alert and interactive  Neurology: Oriented, following commands  Respiratory: Clear bilaterally, Trach site ok.  CV: A flutter  Abdominal: Soft, Nontender  Extremities: Warm, well-perfused  -------------------------------------------------------------------------------------------------------------------------------    Labs:                        8.4    11.13 )-----------( 345      ( 2025 00:28 )             26.3     02-18    136  |  97  |  33[H]  ----------------------------<  105[H]  4.3   |  26  |  5.01[H]    Ca    9.0      2025 00:29  Phos  3.8     02-18  Mg     2.6     02-18    TPro  6.4  /  Alb  2.9[L]  /  TBili  0.4  /  DBili  x   /  AST  18  /  ALT  11  /  AlkPhos  102  02-18    LIVER FUNCTIONS - ( 2025 00:29 )  Alb: 2.9 g/dL / Pro: 6.4 g/dL / ALK PHOS: 102 U/L / ALT: 11 U/L / AST: 18 U/L / GGT: x           PT/INR - ( 2025 04:27 )   PT: 14.1 sec;   INR: 1.23 ratio       PTT - ( 2025 04:27 )  PTT:51.1 sec  ------------------------------------------------------------------------------------------------------------------------------  Assessment:  55 yo M s/p CABG x 3 on 24    Postop acute respiratory failure  Acute blood loss anemia  ESRD on iHD   hx of ESBL   hx of VRE  Serratia infection  MSRA infection   sacral decub       Plan:  ***Neuro***  Postoperative acute pain control with Robaxin and Tylenol  Trazodone, Remeron, and Melatonin nightly  PT ongoing    ***Cardiovascular***  s/p CABG x 3  A flutter - Tolerating beta blocker, PO Amio for Afib  ASA / Statin daily  TTE  ECHO  LV EF nl, RV remains dilated and hypokinetic (chronic)    ***Pulmonary***  Postoperative acute respiratory insufficiency - HFTC   Tracheostomy    Mucomyst, normal saline nebs, Xopenex to help mobilize secretions  Left lung PNA, + e coli ESBL - cleared but now with VRE   Trach collar trials ongoing - HFTC at night, TC during the day   Being evaluated for possible left diaphragm plication  - since he is clinically improving will continue current conservative care for now  - thoracic team following  Needs aggressive pulm toileting   Plan for bronchoscopy today for recurrent AUGUSTO opacification     ***GI***  Tolerating Tube feeds at goal  Protonix for stress ulcer prophylaxis.   Bowel regimen    ***Renal***  ESRD - tolerating iHD   Nephro-Lisette for renal support    ***ID***  Monitor cultures   BAL - commensal manjit    VRE BAL - Linezolid    Acyclovir for + HSV in BAL   Serratia on tissue cx and h/o ESBL PNA - Meropenem remains on   : MRSA and serratia from cyst on the back. Levaquin + Vanco, but now off Levaquin.  Meropenem  -  (leukocytosis - empiric)     ***Endocrine***  Hyperglycemia - ISS    ***Hematology***  Acute blood loss anemia - no transfusion indication currently.  CBC, coags  Continue Epogen.  Heparin infusion (AF)     Wound:  s/p sacral debridement on  . wound vac placed on  and changed three times a week    *** Lines ***  RUE Midline         Care plan discussed with the ICU care team.   Patient remains critical, at risk for life threatening decompensation.    I have spent 36 minutes providing critical care management to this patient.    By signing my name below, I, Baldemar Hernandez, attest that this documentation has been prepared under the direction and in the presence of Blaze Finch MD.  Electronically signed: Evelio Seivlla 25 @ 06:40    I, Blaze Finch MD,  personally performed the services described in this documentation. All medical record entries made by the scribe were at my direction and in my presence. I have reviewed the chart and agree that the record reflects my personal performance and is accurate and complete  Electronically signed: Blaze Finch MD.

## 2025-02-18 NOTE — PROCEDURE NOTE - NSPERFORMEDBY_GEN_A_CORE
Pt was supposed to f/u with cardiology at [facility] and I thought that they were going to move up her appt? I dont manage her blood pressure medications typically her cardiologist does. Maybe we can touch base with the cardiology nurse and see if they have recs-it appears they had thought about titrating imdur and then initiating hydralazine    Nurse line number Monday through Friday 8 AM to 4:30 [phone].      Blaze Noe PA/PA

## 2025-02-18 NOTE — PROGRESS NOTE ADULT - ASSESSMENT
54M with PMH of HTN, HLD, ESRD on HD MWF via LUE AVF, ascites (requiring repeat paracenteses q4-6wks), NICM with moderate LV dysfunction w/ EF 35-40%(2023) and CAD s/p atherectomy + SALLIE to D1(4/11/2024) presents to [facility] transferred from [facility] for CABG eval  Nephro on [facility] for HD    ESRD on HD   Regular schedule MWF   Access LUE AVF  Center [facility]  Nephrologist Dr. Bailey  S/p HD on 01/29, 2/3 and 02/05  S/p HD on 02/07   s/p hd 2/10 uf 3L  S/p PUF on -2/11 and HD on 02/12  S/p HD on 02/13, 02/14, 02/15 for Hyperkalemia   Refusing HD today resuming MWF schedule   HD tomorrow  Keep MAP>65  Renal Diet  Consent in physical chart    Hyper/Hypokalemia  Monitor K    HTN  bp fluctuating  monitor bp     CKD MBD  Can send a PTH  Ca and Phos daily    Anemia  CRISTIANE with HD  Transfuse if hgb <7    CAD with multivessel disease  s/p CABG 12/30  CTICU following    Hypoxic Resp failure requiring Trach  Per surgery  S/p bronchoscopy, pulm following 54M with PMH of HTN, HLD, ESRD on HD MWF via LUE AVF, ascites (requiring repeat paracenteses q4-6wks), NICM with moderate LV dysfunction w/ EF 35-40%(2023) and CAD s/p atherectomy + SALLIE to D1(4/11/2024) presents to [facility] transferred from [facility] for CABG eval  Nephro on [facility] for HD    ESRD on HD   Regular schedule MWF   Access LUE AVF  Center [facility]  Nephrologist Dr. Bailey  S/p HD on 01/29, 2/3 and 02/05  S/p HD on 02/07   s/p hd 2/10 uf 3L  S/p PUF on -2/11 and HD on 02/12  S/p HD on 02/13, 02/14, 02/15 for Hyperkalemia   Refusing HD today asking to resume MWF schedule   HD tomorrow  Keep MAP>65  Renal Diet  Consent in physical chart    Hyper/Hypokalemia  Monitor K    HTN  bp fluctuating  monitor bp     CKD MBD  Can send a PTH  Ca and Phos daily    Anemia  CRISTIANE with HD  Transfuse if hgb <7    CAD with multivessel disease  s/p CABG 12/30  CTICU following    Hypoxic Resp failure requiring Trach  Per surgery  S/p bronchoscopy, pulm following

## 2025-02-18 NOTE — PROGRESS NOTE ADULT - SUBJECTIVE AND OBJECTIVE BOX
Patient 54 Y male evaluated measured for LLE walking and stance  carbon AFO to assist in treatment of left drop foot weakness.  The AFO was requested and discussed with Physical Therapy  to aid in in stability  for patient when out of bed left AFO with protective    AFO socks and walking shoe  measured fitted today by Orthotist from   [facility] [phone]

## 2025-02-18 NOTE — PROCEDURE NOTE - NSSEDATIONDETAIL_GEN_A_CORE
There was continuous monitoring of patient's oxygen saturation, respiratory rate, heart rate, blood pressure, level of consciousness, and physiological status./Oxygen therapy was applied./IV access was obtained.
There was continuous monitoring of patient's oxygen saturation, respiratory rate, heart rate, blood pressure, level of consciousness, and physiological status./Oxygen therapy was applied./IV access was obtained.

## 2025-02-18 NOTE — PROGRESS NOTE ADULT - ASSESSMENT
54M with HTN, HLD, ESRD on HD MWF via LUE AVF, ascites (requiring repeat paracenteses q4-6wks), NICM with moderate LV dysfunction w/ EF 35-40%() and CAD s/p atherectomy + SALLIE to D1 (2024) presents to [facility] 2024 as transfer from [facility] (via [facility]) where he presented  with SOB.   In [facility] ED, pt was hypoxic to 86% on RA, trop 1.7K, EKG no new changes, CXR fluid overload. Admitted for AHRF 2/2 fluid overload. Pt received HD on , ,  and . DAPT was resumed, TTE showed improvement in LVEF to 40-45%. Stress test was abnormal with 1mm inferior STD, reversible ischemia in the inferior wall and fixed defects in the lateral, anterior and apical walls with post stress EF of 24%. Pt was then transferred to [facility] for cardiac cath which showed pLAD 70% ISR, mLCx 70%, D1 severe dz. Patient now transferred to [facility] 2024 for CABG.       - underwent  C3L free LIMA-LAD; SVG-Diag; SVG-leftPL   - extubated   - hypotension and ECHO showing Systolic HF/depressed BIV Function, currently supported with /pressors.  Labs this evening showing transaminitis   - hypotensive, febrile and reintubated  1/3 - cultures (+) E coli +ESBL bacteremia   - underwent tracheostomy   - left lung collapse s/p bronchoscopy   - seen by plastic surgery / colorectal for sacral wound, no surgical intervention, no evidence of fistula, BC sent  - c/o right arm  pain, started on acyclovir for positive HSV PCR     doing a little better tissue culture with serratia; bronch also growing some yeast and VREC   back with nodule with some discharge    sacral decubitus less tender, some bloody secretions from trach  - still with drainage from back lesion  s/p I & D of lesion and sacrum   noted left foot numness , foot drop   2/10 - completed abs for back- dried up  local care for decubitus. thoracic surgery to assess left diaphragm    jump in WBC   WBC slightly less    Recommendations  - completed linezolid course  -completed meropenem  completed abs for back   - surgical f/u appreciated   - check cultures from bronch  neuro input appreciated  - local care for sacral decubitus, more comfortable     checked BC x 2 sets for jump in WBC,   paracentesis not c/w infection   for further suctioning and evaluation of lung  completed meropenem  changed lines   checked sputum culture from bronch   ? repeat echo      Marla Champion M.D. ,   please reach via teams   If no answer, or after 5PM/ weekends,  then please call  [phone]    Assessment and plan discussed with the primary team .

## 2025-02-18 NOTE — PROGRESS NOTE ADULT - SUBJECTIVE AND OBJECTIVE BOX
MR#[id number]  PATIENT NAME:RAAD CANO    DATE OF SERVICE: 02-18-25 @ 06:10  Patient was seen and examined by Solo Quick MD on    02-18-25 @ 06:10 .  Interim events noted.Consultant notes ,Labs,Telemetry reviewed by me       HOSPITAL COURSE: HPI:  54M with PMH of HTN, HLD, ESRD on HD MWF via LUE AVF, ascites (requiring repeat paracenteses q4-6wks), NICM with moderate LV dysfunction w/ EF 35-40%(2023) and CAD s/p atherectomy + SALLIE to D1(4/11/2024) presents to [facility] transferred from [facility]. Patient initially presented to [facility] ED with shortness of breath. Of note he was recently admitted from 09/27-12/02 for acute cholecystitis s/p cholecystectomy. In [facility] ED, pt was hypoxic to 86% on RA, trop 1.7K, EKG no new changes, CXR fluid overload. Admitted for AHRF 2/2 fluid overload. Pt received HD on 12/18, 12/19, 12/20 and 12/22. DAPT was resumed, TTE showed improvement in LVEF to 40-45%. Stress test was abnormal with 1mm inferior STD, reversible ischemia in the inferior wall and fixed defects in the lateral, anterior and apical walls with post stress EF of 24%. Pt was then transferred to [facility] for cardiac cath which showed pLAD 70% ISR, mLCx 70%, D1 severe dz. Patient now transferred to [facility] CTS Dr. Rivers for CABG eval. Patient denies any current SOB or chest pain on admission. Otherwise denies headaches, abdominal pain, urinary or bowel changes, fevers, chills, N/V/D, sick contacts.  (24 Dec 2024 05:07)      INTERIM EVENTS:Patient seen at bedside ,interim events noted.  12/23 Cardiac cath  pLAD 70% ISR, mLCx 70%, D1 severe dz.   12/31-POD#1-C3L free LIMA-LAD; SVG-Diag; SVG-leftPL Awake OOB extubated Sinus rhythm on Dobutamine gtt  02/04-Awake alert on T collar-TC during the day and PC: 12/10//15//50% at night-interrmittent rapid AF noted  02/07-Awake had Bronch yesterday-  02/08-Seen by [facility] for LLE weakness appears peripheral neuropathy  02/10-Awake on T Collar trial is ambulsting-AF  02/12-Awake still c/o orthopnea   02/14-sniff test  showed paradoxical diaphragmatic motion    02/15-Awake AF   02/16-AWake c/o buttock pain AF on T Collar  02/17-Awake tolerating T Collar is ambulating,AF    02/18-Awake on HFTC Atrial Flutter~~80's          MEDICATIONS  (STANDING):  aMIOdarone    Tablet 200 milliGRAM(s) Oral daily  ascorbic acid 500 milliGRAM(s) Oral daily  aspirin  chewable 81 milliGRAM(s) Oral daily  atorvastatin 80 milliGRAM(s) Oral at bedtime  chlorhexidine 2% Cloths 1 Application(s) Topical daily  chlorhexidine 4% Liquid 1 Application(s) Topical <User Schedule>  dextrose 50% Injectable 50 milliLiter(s) IV Push every 15 minutes  epoetin ignacia (EPOGEN) Injectable 97784 Unit(s) IV Push <User Schedule>  ferrous    sulfate Liquid 300 milliGRAM(s) Enteral Tube daily  heparin  Infusion 500 Unit(s)/Hr (12.5 mL/Hr) IV Continuous <Continuous>  insulin lispro (ADMELOG) corrective regimen sliding scale   SubCutaneous every 6 hours  melatonin 5 milliGRAM(s) Oral at bedtime  methocarbamol 500 milliGRAM(s) Oral every 8 hours  metoprolol tartrate 12.5 milliGRAM(s) Oral <User Schedule>  mirtazapine 7.5 milliGRAM(s) Oral at bedtime  Nephro-elicia 1 Tablet(s) Oral daily  pantoprazole  Injectable 40 milliGRAM(s) IV Push daily  sodium chloride 0.65% Nasal 1 Spray(s) Both Nostrils every 12 hours  sodium chloride 0.9%. 1000 milliLiter(s) (10 mL/Hr) IV Continuous <Continuous>  sodium zirconium cyclosilicate 10 Gram(s) Oral <User Schedule>  traZODone 50 milliGRAM(s) Oral at bedtime    MEDICATIONS  (PRN):  acetaminophen     Tablet .. 650 milliGRAM(s) Oral every 6 hours PRN Mild Pain (1 - 3)  lidocaine/prilocaine Cream 1 Application(s) Topical daily PRN pre-HD  sodium chloride 0.9% lock flush 10 milliLiter(s) IV Push every 1 hour PRN Pre/post blood products, medications, blood draw, and to maintain line patency            REVIEW OF SYSTEMS:  Constitutional: [ ] fever, [ ]weight loss,  [ ]fatigue [ ]weight gain  Eyes: [ ] visual changes  Respiratory: [ ]shortness of breath;  [ ] cough, [ ]wheezing, [ ]chills, [ ]hemoptysis  Cardiovascular: [ ] chest pain, [ ]palpitations, [ ]dizziness,  [ ]leg swelling[ ]orthopnea[ ]PND  Gastrointestinal: [ ] abdominal pain, [ ]nausea, [ ]vomiting,  [ ]diarrhea [ ]Constipation [ ]Melena  Genitourinary: [ ] dysuria, [ ] hematuria [ ]Vigil  Neurologic: [ ] headaches [ ] tremors[ ]weakness [ ]Paralysis Right[ ] Left[ ]  Skin: [ ] itching, [ ]burning, [ ] rashes  Endocrine: [ ] heat or cold intolerance  Musculoskeletal: [ ] joint pain or swelling; [ ] muscle, back, or extremity pain  Psychiatric: [ ] depression, [ ]anxiety, [ ]mood swings, or [ ]difficulty sleeping  Hematologic: [ ] easy bruising, [ ] bleeding gums    [ ] All remaining systems negative except as per above.   [ ]Unable to obtain.  [x] No change in ROS since admission      Vital Signs Last 24 Hrs  T(C): 36.5 (18 Feb 2025 04:00), Max: 37.1 (17 Feb 2025 08:00)  T(F): 97.7 (18 Feb 2025 04:00), Max: 98.7 (17 Feb 2025 08:00)  HR: 109 (18 Feb 2025 06:00) (62 - 109)  BP: 111/62 (18 Feb 2025 06:00) (111/62 - 175/80)  BP(mean): 81 (18 Feb 2025 06:00) (81 - 125)  RR: 32 (18 Feb 2025 06:00) (11 - 32)  SpO2: 95% (18 Feb 2025 06:00) (91% - 99%)    Parameters below as of 18 Feb 2025 04:00  Patient On (Oxygen Delivery Method): [facility]  O2 Flow (L/min): 30  O2 Concentration (%): 40  I&O's Summary    16 Feb 2025 07:01  -  17 Feb 2025 07:00  --------------------------------------------------------  IN: 1545 mL / OUT: 0 mL / NET: 1545 mL    17 Feb 2025 07:01  -  18 Feb 2025 06:11  --------------------------------------------------------  IN: 735.5 mL / OUT: 0 mL / NET: 735.5 mL        PHYSICAL EXAM:  General: No acute distress BMI-28  HEENT: EOMI, PERRL  Neck: Supple, [ ] JVD  Lungs: Equal air entry bilaterally; [ ] rales [ ] wheezing [ ] rhonchi  Heart: Regular rate and rhythm; [x ] murmur   2/6 [ x] systolic [ ] diastolic [ ] radiation[ ] rubs [ ]  gallops  Abdomen: Nontender, bowel sounds present  Extremities: No clubbing, cyanosis, [ ] edema [ ]Pulses  equal and intact  Nervous system:  Alert & Oriented X3, no focal deficits  Psychiatric: Normal affect  Skin: No rashes or lesions    LABS:  02-18    136  |  97  |  33[H]  ----------------------------<  105[H]  4.3   |  26  |  5.01[H]    Ca    9.0      18 Feb 2025 00:29  Phos  3.8     02-18  Mg     2.6     02-18    TPro  6.4  /  Alb  2.9[L]  /  TBili  0.4  /  DBili  x   /  AST  18  /  ALT  11  /  AlkPhos  102  02-18    Creatinine Trend: 5.01<--, 4.05<--, 2.93<--, 3.29<--, 4.44<--, 4.10<--                        8.4    11.13 )-----------( 345      ( 18 Feb 2025 00:28 )             26.3     PT/INR - ( 18 Feb 2025 04:27 )   PT: 14.1 sec;   INR: 1.23 ratio         PTT - ( 18 Feb 2025 04:27 )  PTT:51.1 sec          Xray Chest 1 View- PORTABLE-Urgent (Xray Chest 1 View- PORTABLE-Urgent .) (02.17.25 @ 13:20) >  FINDINGS:  Chest x-ray 2/17/2025 at 5:45 AM:    Enteric tube terminates in the stomach. Tracheostomy cannula in position.   Median sternotomy wires.  The heart is enlarged.  Increased opacification the left lung with very little aerated parenchyma  in the left upper lobe, attributed to left pleural effusionand  underlying consolidation/atelectasis. Congestive changes right lung   similar to prior  No acute bony abnormality.    Chest x-ray 2/17/2025 at 12:46 PM:    Slight interval improvement of aeration of left upper lobe. Otherwise no  significant interval change.        TTE Limited W or WO Ultrasound Enhancing Agent (02.09.25 @ 14:29) >  CONCLUSIONS:      1. Left ventricular systolic function is normal with an ejection fraction visually estimated at 55 to 60 %.   2. Enlarged right ventricular cavity size and reduced right ventricular systolic function.   3. Small pericardial effusion with no echocardiographic evidence of tamponade physiology: respiratory variation across the mitral valve E wave is not greater than 30%, respiratory variation across the tricuspid valve E wave is not greater than 60%, no diastolic collapse of right atrium, exceding1/3 of the cardiac cycle and no early diastolic inversion of the right ventricle.   4. Compared to the transthoracic echocardiogram performed on 2/3/2025, the pericardial effusion has decreased in size.

## 2025-02-18 NOTE — CHART NOTE - NSCHARTNOTEFT_GEN_A_CORE
NUTRITION FOLLOW UP NOTE    PATIENT SEEN FOR: ICU follow up    SOURCE: [] Patient  [x] Current Medical Record  [x] RN  [] Family/support person at bedside  [x] Patient unavailable/inappropriate  [] Other:    CHART REVIEWED/EVENTS NOTED.  [] No changes to nutrition care plan to note  [x] Nutrition Status:  -s/p CABG x 3 on 24  -25 tracheostomy placement. tolerating TC  -s/p sacral debridement on  . wound vac placed on   -ESRD - tolerating iHD, on HD PTA.  -s/p bronchoscopy this morning    DIET ORDER:   Diet, NPO with Tube Feed:   Tube Feeding Modality: Nasogastric  Vital AF (VITALAFRTH)  Total Volume for 24 Hours (mL): 1440  Continuous  Starting Tube Feed Rate {mL per Hour}: 20  Increase Tube Feed Rate by (mL): 10     Every 4 hours  Until Goal Tube Feed Rate (mL per Hour): 60  Tube Feed Duration (in Hours): 24  Tube Feed Start Time: 09:00 (25)      CURRENT DIET ORDER IS:  [] Appropriate:  [x] Inadequate: See recommendation below   [] Other:    NUTRITION INTAKE/PROVISION:  [] PO:  [x] Enteral Nutrition:  : Vital 1.5 changed to Nepro. : Nepro changed to Vital AF 1.2.  EN Order Provides:  1440 ml formula, 1728 kcal, 108 g protein, 1168 ml free water; meets 22.2 kcal/kg and 1.38 g protein/kg, based on IBW 172lb/78kg    Current Pump Rate: off, being held for nausea  5-Day Average Enteral Provision per RN flowsheet: 772 ml, 1268 kcal, 61 g protein  [] Parenteral Nutrition:    ANTHROPOMETRICS:  Drug Dosing Weight  Height (cm): 180.3 (30 Dec 2024 12:01)  Weight (kg): 85.1 (30 Dec 2024 12:01)  BMI (kg/m2): 26.2 (30 Dec 2024 12:01)  Weights:   Daily Weight in k (-18), 73 (-17), 80.6 (-16), 80.6 (-15), 84.4 (), 78 (), 74.7 (), 85.7 (), 85 (02-10), 88 (02-10), 87.5 (02-10), 89.7 (), 85.9 (), 80.8 (), 87 (), 86 (), 86 (), 87.1 (), 88.1 (), 89.8 (), 91.2 (), 97.1 (), 85.6 (), 81.6 (), Weight in k.7 (), 80 (), 88 (), 83.4 (), 77.7 (), 82 (01-15), 81 (), 81 (), 88.7 (), 83.3 (), 97.6 (01-10), 79.6 (), 93.4 (), 87.9 (), 85 (), 86.2 (), 90 (), 84 (), 77.5 (), 87.8 (), 88.3 (, standing), 85.3 (, standing), 85 (, standing), 79.7 (), 80.9 (, standing), 81.3 (, standing), 82.6 ().  Weight fluctuations likely in setting of fluid shifts and/or scale inaccuracies. RD to continue to monitor weight trends as available/able.     NUTRITIONALLY PERTINENT MEDICATIONS:  MEDICATIONS  (STANDING):  aMIOdarone    Tablet 200 milliGRAM(s) Oral daily  ascorbic acid 500 milliGRAM(s) Oral daily  atorvastatin 80 milliGRAM(s) Oral at bedtime  dextrose 50% Injectable 50 milliLiter(s) IV Push every 15 minutes  ferrous    sulfate Liquid 300 milliGRAM(s) Enteral Tube daily  insulin lispro (ADMELOG) corrective regimen sliding scale   SubCutaneous every 6 hours  metoprolol tartrate 25 milliGRAM(s) Oral two times a day  mirtazapine 7.5 milliGRAM(s) Oral at bedtime  Nephro-elicia 1 Tablet(s) Oral daily  pantoprazole  Injectable 40 milliGRAM(s) IV Push daily  sodium chloride 0.65% Nasal 1 Spray(s) Both Nostrils every 12 hours  sodium chloride 0.9%. 1000 milliLiter(s) (10 mL/Hr) IV Continuous <Continuous>  sodium zirconium cyclosilicate 10 Gram(s) Oral <User Schedule>       NUTRITIONALLY PERTINENT LABS:   Na136 mmol/L Glu 105 mg/dL[H] K+ 4.3 mmol/L Cr  5.01 mg/dL[H] BUN 33 mg/dL[H]  Phos 3.8 mg/dL  Alb 2.9 g/dL[L] ALT 11 U/L AST 18 U/L Alkaline Phosphatase 102 U/L    A1C with Estimated Average Glucose Result: 5.6 % (24 @ 06:50)          Finger Sticks:  POCT Blood Glucose.: 111 mg/dL ( @ 11:15)  POCT Blood Glucose.: 110 mg/dL ( @ 05:58)  POCT Blood Glucose.: 114 mg/dL ( @ 00:08)  POCT Blood Glucose.: 114 mg/dL ( @ 17:08)      NUTRITIONALLY PERTINENT MEDICATIONS/LABS:  [x] Reviewed  [x] Relevant notes on medications/labs:  -insulin for glycemic control  -remeron which can stimulate appetite  -lokelma 3x/ week    EDEMA:  [x] Reviewed  [x] Relevant notes: No noted edema as per flowsheets.     GI/ I&O:  [x] Reviewed  [x] Relevant notes: nausea. Last BM yesterday x 1 per flowsheets. tolerating enteral nutrition per chart.  [] Other:    SKIN:   [] No pressure injuries documented, per nursing flowsheet  [x] Pressure injury previously noted  -sacrum unstageable pressure injury per flowsheets   [] Change in pressure injury documentation:  [x] Other:  -Per Wound Care : "Sacral/buttocks wound 2/2 skin failure, Rt upper back injury now resolved"  -midsternal surgical incision from CABG     ESTIMATED NEEDS:  [x] No change:  [] Updated:  Energy:  1459-4937 kcal/day (30-35 kcal/kg)  Protein:  109-140 g/day (1.4-1.8 g/kg)  Fluid:   ml/day or [x] defer to team  Based on: IBW 172lb/78kg    NUTRITION DIAGNOSIS:  [x] Prior Dx:  Inadequate energy intake, Increased Nutrient Needs (protein-energy, micronutrients)  [] New Dx:    EDUCATION:  [] Yes:  [x] Not appropriate/warranted    NUTRITION CARE PLAN:  1. Diet: to better meet patients protein-energy needs and as team wants patient on Vital, recommend change formula to Vital 1.5 @ 20ml/hr, increase by 10ml q6hr to goal rate 70ml/hr x 24hr to provide 2520kcal, 113g protein, 1284ml free water; meets 32.2 kcal/kg and 1.45 g/kg protein based on IBW 172lb/78kg. Defer free water flushes to medical team.  2. Multivitamin/mineral supplementation:  continue nephro-elicia, defer Vitamin C to medial team in setting of HD.    [] Achieved - Continue current nutrition intervention(s)  [] Current medical condition precludes nutrition intervention at this time.    MONITORING AND EVALUATION:   RD remains available upon request and will follow up per protocol.    Corinne Lima RDN, CDN - available via [facility] TEAMS

## 2025-02-18 NOTE — PROGRESS NOTE ADULT - SUBJECTIVE AND OBJECTIVE BOX
[facility]   MD DREW VIZCARRA MD MARIA SANTIAGO, NP    TEL:  OFFICE: [phone]  From 5pm-7am Answering Service [phone]    -- RENAL FOLLOW UP NOTE ---Date of Service 02-18-25 @ 15:06    Patient is a 54y old  Male who presents with a chief complaint of CAD s/p CABG       Patient seen and examined at bedside. No chest pain/sob    VITALS:  T(F): 97.7 (02-18-25 @ 12:00), Max: 98.4 (02-17-25 @ 16:00)  HR: 78 (02-18-25 @ 15:00)  BP: 152/74 (02-18-25 @ 14:00)  RR: 22 (02-18-25 @ 15:00)  SpO2: 100% (02-18-25 @ 15:00)  Wt(kg): --    02-17 @ 07:01  -  02-18 @ 07:00  --------------------------------------------------------  IN: 778 mL / OUT: 0 mL / NET: 778 mL    02-18 @ 07:01  -  02-18 @ 15:06  --------------------------------------------------------  IN: 330 mL / OUT: 0 mL / NET: 330 mL          PHYSICAL EXAM:  General: NAD  Neck: No JVD  Respiratory: CTAB, no wheezes, rales or rhonchi; +trach collar  Cardiovascular: S1, S2, RRR  Gastrointestinal: BS+, soft, NT/ND  Extremities: No peripheral edema    Hospital Medications:   MEDICATIONS  (STANDING):  aMIOdarone    Tablet 200 milliGRAM(s) Oral daily  ascorbic acid 500 milliGRAM(s) Oral daily  aspirin  chewable 81 milliGRAM(s) Oral daily  atorvastatin 80 milliGRAM(s) Oral at bedtime  chlorhexidine 0.12% Liquid 15 milliLiter(s) Oral Mucosa every 12 hours  chlorhexidine 2% Cloths 1 Application(s) Topical daily  chlorhexidine 4% Liquid 1 Application(s) Topical <User Schedule>  dextrose 50% Injectable 50 milliLiter(s) IV Push every 15 minutes  epoetin ignacia (EPOGEN) Injectable 62917 Unit(s) IV Push <User Schedule>  ferrous    sulfate Liquid 300 milliGRAM(s) Enteral Tube daily  heparin  Infusion 500 Unit(s)/Hr (12.5 mL/Hr) IV Continuous <Continuous>  insulin lispro (ADMELOG) corrective regimen sliding scale   SubCutaneous every 6 hours  melatonin 5 milliGRAM(s) Oral at bedtime  methocarbamol 500 milliGRAM(s) Oral every 8 hours  metoprolol tartrate 25 milliGRAM(s) Oral two times a day  mirtazapine 7.5 milliGRAM(s) Oral at bedtime  Nephro-elicia 1 Tablet(s) Oral daily  pantoprazole  Injectable 40 milliGRAM(s) IV Push daily  sodium chloride 0.65% Nasal 1 Spray(s) Both Nostrils every 12 hours  sodium chloride 0.9%. 1000 milliLiter(s) (10 mL/Hr) IV Continuous <Continuous>  sodium zirconium cyclosilicate 10 Gram(s) Oral <User Schedule>  traZODone 50 milliGRAM(s) Oral at bedtime      LABS:  02-18    136  |  97  |  33[H]  ----------------------------<  105[H]  4.3   |  26  |  5.01[H]    Ca    9.0      18 Feb 2025 00:29  Phos  3.8     02-18  Mg     2.6     02-18    TPro  6.4  /  Alb  2.9[L]  /  TBili  0.4  /  DBili      /  AST  18  /  ALT  11  /  AlkPhos  102  02-18    Creatinine Trend: 5.01 <--, 4.05 <--, 2.93 <--, 3.29 <--, 4.44 <--, 4.10 <--, 6.47 <--, 6.04 <--, 5.88 <--, 5.80 <--, 5.62 <--, 5.48 <--    Phosphorus: 3.8 mg/dL (02-18 @ 00:29)  Albumin: 2.9 g/dL (02-18 @ 00:29)                              8.4    11.13 )-----------( 345      ( 18 Feb 2025 00:28 )             26.3     Urine Studies:  Urinalysis - [02-18-25 @ 00:29]      Color  / Appearance  / SG  / pH       Gluc 105 / Ketone   / Bili  / Urobili        Blood  / Protein  / Leuk Est  / Nitrite       RBC  / WBC  / Hyaline  / Gran  / Sq Epi  / Non Sq Epi  / Bacteria       Iron 29, TIBC 143, %sat 20      [12-19-24 @ 05:00]  Ferritin 904      [12-19-24 @ 05:00]  TSH 4.75      [12-24-24 @ 06:50]        RADIOLOGY & ADDITIONAL STUDIES:

## 2025-02-18 NOTE — PROGRESS NOTE ADULT - SUBJECTIVE AND OBJECTIVE BOX
Patient is a 54y old  Male who presents with a chief complaint of CAD s/p CABG (18 Feb 2025 06:10)    Being followed by ID for        Interval history:  No other acute events      ROS:  No cough,SOB,CP  No N/V/D  No abd pain  No urinary complaints  No HA  No joint or limb pain  No other complaints    PAST MEDICAL & SURGICAL HISTORY:  Type 2 diabetes mellitus      Hypertension      End stage renal disease  started HD 2/2019 T, Th, Sat via right chest permacath      Bone spur  right shoulder- hx of - sx done      Anemia      Injury of right wrist  hx of at age 14      History of hyperkalemia  before HD- K-6.2 - to repeat in am of sx, pt had HD today      S/P arthroscopy of right shoulder  2010      History of vascular access device  right chest permacath - 2/2019      H/O right wrist surgery  tendons repair - at age 14      AV fistula      H/O ventral hernia repair      S/P cholecystectomy        Allergies    No Known Allergies    Intolerances      Antimicrobials:      MEDICATIONS  (STANDING):  aMIOdarone    Tablet 200 milliGRAM(s) Oral daily  ascorbic acid 500 milliGRAM(s) Oral daily  aspirin  chewable 81 milliGRAM(s) Oral daily  atorvastatin 80 milliGRAM(s) Oral at bedtime  chlorhexidine 2% Cloths 1 Application(s) Topical daily  chlorhexidine 4% Liquid 1 Application(s) Topical <User Schedule>  dextrose 50% Injectable 50 milliLiter(s) IV Push every 15 minutes  epoetin ignacia (EPOGEN) Injectable 70431 Unit(s) IV Push <User Schedule>  ferrous    sulfate Liquid 300 milliGRAM(s) Enteral Tube daily  heparin  Infusion 500 Unit(s)/Hr (12.5 mL/Hr) IV Continuous <Continuous>  insulin lispro (ADMELOG) corrective regimen sliding scale   SubCutaneous every 6 hours  ketamine Injectable 50 milliGRAM(s) IV Push once  lidocaine 2% Injectable 10 milliLiter(s) Local Injection once  melatonin 5 milliGRAM(s) Oral at bedtime  methocarbamol 500 milliGRAM(s) Oral every 8 hours  metoprolol tartrate 12.5 milliGRAM(s) Oral <User Schedule>  mirtazapine 7.5 milliGRAM(s) Oral at bedtime  Nephro-elicia 1 Tablet(s) Oral daily  pantoprazole  Injectable 40 milliGRAM(s) IV Push daily  sodium chloride 0.65% Nasal 1 Spray(s) Both Nostrils every 12 hours  sodium chloride 0.9%. 1000 milliLiter(s) (10 mL/Hr) IV Continuous <Continuous>  sodium zirconium cyclosilicate 10 Gram(s) Oral <User Schedule>  traZODone 50 milliGRAM(s) Oral at bedtime      Vital Signs Last 24 Hrs  T(C): 36.9 (02-18-25 @ 08:00), Max: 36.9 (02-17-25 @ 12:00)  T(F): 98.4 (02-18-25 @ 08:00), Max: 98.4 (02-17-25 @ 12:00)  HR: 92 (02-18-25 @ 08:00) (62 - 109)  BP: 153/82 (02-18-25 @ 08:00) (111/62 - 168/77)  BP(mean): 117 (02-18-25 @ 08:00) (81 - 125)  RR: 14 (02-18-25 @ 08:00) (11 - 32)  SpO2: 99% (02-18-25 @ 08:00) (91% - 99%)    Physical Exam:    Constitutional well preserved,comfortable,pleasant    HEENT PERRLA EOMI,No pallor or icterus    No oral exudate or erythema    Neck supple no JVD or LN    Chest Good AE,CTA    CVS RRR S1 S2 WNl No murmur or rub or gallop    Abd soft BS normal No tenderness no masses    Ext No cyanosis clubbing or edema    IV site no erythema tenderness or discharge    Joints no swelling or LOM    CNS AAO X 3 no focal    Lab Data:                          8.4    11.13 )-----------( 345      ( 18 Feb 2025 00:28 )             26.3       02-18    136  |  97  |  33[H]  ----------------------------<  105[H]  4.3   |  26  |  5.01[H]    Ca    9.0      18 Feb 2025 00:29  Phos  3.8     02-18  Mg     2.6     02-18    TPro  6.4  /  Alb  2.9[L]  /  TBili  0.4  /  DBili  x   /  AST  18  /  ALT  11  /  AlkPhos  102  02-18        .Blood BLOOD  02-12-25   No growth at 5 days  --  --      Ascites Fl  02-11-25   No growth at 5 days  --    polymorphonuclear leukocytes seen  No organisms seen  by cytocentrifuge      .Sputum Sputum  02-11-25   Commensal manjit consistent with body site  --    Rare polymorphonuclear leukocytes per low power field  No Squamous epithelial cells per low power field  Rare Gram Negative Rods seen per oil power field                    WBC Count: 11.13 (02-18-25 @ 00:28)  WBC Count: 10.52 (02-17-25 @ 00:51)  WBC Count: 10.07 (02-16-25 @ 00:37)  WBC Count: 9.72 (02-15-25 @ 00:07)  WBC Count: 13.34 (02-14-25 @ 00:30)  WBC Count: 14.59 (02-13-25 @ 00:20)  WBC Count: 16.84 (02-12-25 @ 00:36)  WBC Count: 16.96 (02-11-25 @ 19:34)       Bilirubin Total: 0.4 mg/dL (02-18-25 @ 00:29)  Aspartate Aminotransferase (AST/SGOT): 18 U/L (02-18-25 @ 00:29)  Alanine Aminotransferase (ALT/SGPT): 11 U/L (02-18-25 @ 00:29)  Alkaline Phosphatase: 102 U/L (02-18-25 @ 00:29)  Bilirubin Total: 0.4 mg/dL (02-17-25 @ 00:52)  Aspartate Aminotransferase (AST/SGOT): 19 U/L (02-17-25 @ 00:52)  Alanine Aminotransferase (ALT/SGPT): 9 U/L (02-17-25 @ 00:52)  Alkaline Phosphatase: 100 U/L (02-17-25 @ 00:52)  Bilirubin Total: 0.3 mg/dL (02-16-25 @ 00:36)  Aspartate Aminotransferase (AST/SGOT): 17 U/L (02-16-25 @ 00:36)  Alanine Aminotransferase (ALT/SGPT): 8 U/L (02-16-25 @ 00:36)  Alkaline Phosphatase: 101 U/L (02-16-25 @ 00:36)  Bilirubin Total: 0.4 mg/dL (02-15-25 @ 00:07)  Aspartate Aminotransferase (AST/SGOT): 39 U/L (02-15-25 @ 00:07)  Alanine Aminotransferase (ALT/SGPT): 13 U/L (02-15-25 @ 00:07)  Alkaline Phosphatase: 109 U/L (02-15-25 @ 00:07)  Bilirubin Total: 0.4 mg/dL (02-14-25 @ 00:30)  Aspartate Aminotransferase (AST/SGOT): 24 U/L (02-14-25 @ 00:30)  Alanine Aminotransferase (ALT/SGPT): 11 U/L (02-14-25 @ 00:30)  Alkaline Phosphatase: 103 U/L (02-14-25 @ 00:30)  Bilirubin Total: 0.5 mg/dL (02-13-25 @ 18:35)  Aspartate Aminotransferase (AST/SGOT): 19 U/L (02-13-25 @ 18:35)  Alanine Aminotransferase (ALT/SGPT): 11 U/L (02-13-25 @ 18:35)  Alkaline Phosphatase: 117 U/L (02-13-25 @ 18:35)  Bilirubin Total: 0.4 mg/dL (02-13-25 @ 13:02)  Aspartate Aminotransferase (AST/SGOT): 16 U/L (02-13-25 @ 13:02)  Alanine Aminotransferase (ALT/SGPT): 10 U/L (02-13-25 @ 13:02)  Alkaline Phosphatase: 111 U/L (02-13-25 @ 13:02)         Patient is a 54y old  Male who presents with a chief complaint of CAD s/p CABG (18 Feb 2025 06:10)    Being followed by ID for        Interval history:  pt getting a bronchoscopy  still with issues with LLL  currently off abs  No other acute events        PAST MEDICAL & SURGICAL HISTORY:  Type 2 diabetes mellitus      Hypertension      End stage renal disease  started HD 2/2019 T, Th, Sat via right chest permacath      Bone spur  right shoulder- hx of - sx done      Anemia      Injury of right wrist  hx of at age 14      History of hyperkalemia  before HD- K-6.2 - to repeat in am of sx, pt had HD today      S/P arthroscopy of right shoulder  2010      History of vascular access device  right chest permacath - 2/2019      H/O right wrist surgery  tendons repair - at age 14      AV fistula      H/O ventral hernia repair      S/P cholecystectomy        Allergies    No Known Allergies    Intolerances      Antimicrobials:      MEDICATIONS  (STANDING):  aMIOdarone    Tablet 200 milliGRAM(s) Oral daily  ascorbic acid 500 milliGRAM(s) Oral daily  aspirin  chewable 81 milliGRAM(s) Oral daily  atorvastatin 80 milliGRAM(s) Oral at bedtime  chlorhexidine 2% Cloths 1 Application(s) Topical daily  chlorhexidine 4% Liquid 1 Application(s) Topical <User Schedule>  dextrose 50% Injectable 50 milliLiter(s) IV Push every 15 minutes  epoetin ignacia (EPOGEN) Injectable 96539 Unit(s) IV Push <User Schedule>  ferrous    sulfate Liquid 300 milliGRAM(s) Enteral Tube daily  heparin  Infusion 500 Unit(s)/Hr (12.5 mL/Hr) IV Continuous <Continuous>  insulin lispro (ADMELOG) corrective regimen sliding scale   SubCutaneous every 6 hours  ketamine Injectable 50 milliGRAM(s) IV Push once  lidocaine 2% Injectable 10 milliLiter(s) Local Injection once  melatonin 5 milliGRAM(s) Oral at bedtime  methocarbamol 500 milliGRAM(s) Oral every 8 hours  metoprolol tartrate 12.5 milliGRAM(s) Oral <User Schedule>  mirtazapine 7.5 milliGRAM(s) Oral at bedtime  Nephro-elicia 1 Tablet(s) Oral daily  pantoprazole  Injectable 40 milliGRAM(s) IV Push daily  sodium chloride 0.65% Nasal 1 Spray(s) Both Nostrils every 12 hours  sodium chloride 0.9%. 1000 milliLiter(s) (10 mL/Hr) IV Continuous <Continuous>  sodium zirconium cyclosilicate 10 Gram(s) Oral <User Schedule>  traZODone 50 milliGRAM(s) Oral at bedtime      Vital Signs Last 24 Hrs  T(C): 36.9 (02-18-25 @ 08:00), Max: 36.9 (02-17-25 @ 12:00)  T(F): 98.4 (02-18-25 @ 08:00), Max: 98.4 (02-17-25 @ 12:00)  HR: 92 (02-18-25 @ 08:00) (62 - 109)  BP: 153/82 (02-18-25 @ 08:00) (111/62 - 168/77)  BP(mean): 117 (02-18-25 @ 08:00) (81 - 125)  RR: 14 (02-18-25 @ 08:00) (11 - 32)  SpO2: 99% (02-18-25 @ 08:00) (91% - 99%)    Physical Exam:    Constitutional getting bronch  HEENT PERRLA EOMI,No pallor or icterus    Neck trach    Chest getting bronch mod secretions    CVS RRR     Abd soft     Ext No cyanosis clubbing or edema        Lab Data:                          8.4    11.13 )-----------( 345      ( 18 Feb 2025 00:28 )             26.3       02-18    136  |  97  |  33[H]  ----------------------------<  105[H]  4.3   |  26  |  5.01[H]    Ca    9.0      18 Feb 2025 00:29  Phos  3.8     02-18  Mg     2.6     02-18    TPro  6.4  /  Alb  2.9[L]  /  TBili  0.4  /  DBili  x   /  AST  18  /  ALT  11  /  AlkPhos  102  02-18        .Blood BLOOD  02-12-25   No growth at 5 days  --  --      Ascites Fl  02-11-25   No growth at 5 days  --    polymorphonuclear leukocytes seen  No organisms seen  by cytocentrifuge      .Sputum Sputum  02-11-25   Commensal manjit consistent with body site  --    Rare polymorphonuclear leukocytes per low power field  No Squamous epithelial cells per low power field  Rare Gram Negative Rods seen per oil power field        WBC Count: 11.13 (02-18-25 @ 00:28)  WBC Count: 10.52 (02-17-25 @ 00:51)  WBC Count: 10.07 (02-16-25 @ 00:37)  WBC Count: 9.72 (02-15-25 @ 00:07)  WBC Count: 13.34 (02-14-25 @ 00:30)  WBC Count: 14.59 (02-13-25 @ 00:20)  WBC Count: 16.84 (02-12-25 @ 00:36)  WBC Count: 16.96 (02-11-25 @ 19:34)       Bilirubin Total: 0.4 mg/dL (02-18-25 @ 00:29)  Aspartate Aminotransferase (AST/SGOT): 18 U/L (02-18-25 @ 00:29)  Alanine Aminotransferase (ALT/SGPT): 11 U/L (02-18-25 @ 00:29)  Alkaline Phosphatase: 102 U/L (02-18-25 @ 00:29)    Bilirubin Total: 0.5 mg/dL (02-13-25 @ 18:35)  Aspartate Aminotransferase (AST/SGOT): 19 U/L (02-13-25 @ 18:35)  Alanine Aminotransferase (ALT/SGPT): 11 U/L (02-13-25 @ 18:35)  Alkaline Phosphatase: 117 U/L (02-13-25 @ 18:35)    Bilirubin Total: 0.4 mg/dL (02-13-25 @ 13:02)  Aspartate Aminotransferase (AST/SGOT): 16 U/L (02-13-25 @ 13:02)  Alanine Aminotransferase (ALT/SGPT): 10 U/L (02-13-25 @ 13:02)  Alkaline Phosphatase: 111 U/L (02-13-25 @ 13:02)        < from: Xray Chest 1 View- PORTABLE-Urgent (Xray Chest 1 View- PORTABLE-Urgent .) (02.17.25 @ 13:20) >    ACC: [id number] EXAM:  XR CHEST PORTABLE ROUTINE 1V   ORDERED BY: DIEGO CHA     ACC: [id number] EXAM:  XR CHEST PORTABLE URGENT 1V   ORDERED BY: CORIE LESLIE     PROCEDURE DATE:  02/17/2025          INTERPRETATION:  EXAMINATION: XR CHEST URGENT, XR CHEST    CLINICAL INDICATION: assess left lung field    TECHNIQUE: Single frontal, portable view of the chest was obtained.    COMPARISON: Chest x-ray 2/16/2025 and CT chest 1/26/2025    FINDINGS:  Chest x-ray 2/17/2025 at 5:45 AM:    Enteric tube terminates in the stomach. Tracheostomy cannula in position.   Median sternotomy wires.  The heart is enlarged.  Increased opacification the left lung with very little aerated parenchyma   in the left upper lobe, attributed to left pleural effusionand   underlying consolidation/atelectasis. Congestive changes right lung   similar to prior  No acute bony abnormality.    Chest x-ray 2/17/2025 at 12:46 PM:    Slight interval improvement of aeration of left upper lobe. Otherwise no   significant interval change.    IMPRESSION:  As above    --- End of Report ---    < end of copied text >   Patient is a 54y old  Male who presents with a chief complaint of CAD s/p CABG (18 Feb 2025 06:10)    Being followed by ID for        Interval history:  pt getting a bronchoscopy  still with issues with LLL  currently off abs  went back after and pt was resting quielty- notes left leg is less numb  No other acute events        PAST MEDICAL & SURGICAL HISTORY:  Type 2 diabetes mellitus      Hypertension      End stage renal disease  started HD 2/2019 T, Th, Sat via right chest permacath      Bone spur  right shoulder- hx of - sx done      Anemia      Injury of right wrist  hx of at age 14      History of hyperkalemia  before HD- K-6.2 - to repeat in am of sx, pt had HD today      S/P arthroscopy of right shoulder  2010      History of vascular access device  right chest permacath - 2/2019      H/O right wrist surgery  tendons repair - at age 14      AV fistula      H/O ventral hernia repair      S/P cholecystectomy        Allergies    No Known Allergies    Intolerances      Antimicrobials:      MEDICATIONS  (STANDING):  aMIOdarone    Tablet 200 milliGRAM(s) Oral daily  ascorbic acid 500 milliGRAM(s) Oral daily  aspirin  chewable 81 milliGRAM(s) Oral daily  atorvastatin 80 milliGRAM(s) Oral at bedtime  chlorhexidine 2% Cloths 1 Application(s) Topical daily  chlorhexidine 4% Liquid 1 Application(s) Topical <User Schedule>  dextrose 50% Injectable 50 milliLiter(s) IV Push every 15 minutes  epoetin ignacia (EPOGEN) Injectable 27405 Unit(s) IV Push <User Schedule>  ferrous    sulfate Liquid 300 milliGRAM(s) Enteral Tube daily  heparin  Infusion 500 Unit(s)/Hr (12.5 mL/Hr) IV Continuous <Continuous>  insulin lispro (ADMELOG) corrective regimen sliding scale   SubCutaneous every 6 hours  ketamine Injectable 50 milliGRAM(s) IV Push once  lidocaine 2% Injectable 10 milliLiter(s) Local Injection once  melatonin 5 milliGRAM(s) Oral at bedtime  methocarbamol 500 milliGRAM(s) Oral every 8 hours  metoprolol tartrate 12.5 milliGRAM(s) Oral <User Schedule>  mirtazapine 7.5 milliGRAM(s) Oral at bedtime  Nephro-elicia 1 Tablet(s) Oral daily  pantoprazole  Injectable 40 milliGRAM(s) IV Push daily  sodium chloride 0.65% Nasal 1 Spray(s) Both Nostrils every 12 hours  sodium chloride 0.9%. 1000 milliLiter(s) (10 mL/Hr) IV Continuous <Continuous>  sodium zirconium cyclosilicate 10 Gram(s) Oral <User Schedule>  traZODone 50 milliGRAM(s) Oral at bedtime      Vital Signs Last 24 Hrs  T(C): 36.9 (02-18-25 @ 08:00), Max: 36.9 (02-17-25 @ 12:00)  T(F): 98.4 (02-18-25 @ 08:00), Max: 98.4 (02-17-25 @ 12:00)  HR: 92 (02-18-25 @ 08:00) (62 - 109)  BP: 153/82 (02-18-25 @ 08:00) (111/62 - 168/77)  BP(mean): 117 (02-18-25 @ 08:00) (81 - 125)  RR: 14 (02-18-25 @ 08:00) (11 - 32)  SpO2: 99% (02-18-25 @ 08:00) (91% - 99%)    Physical Exam:    Constitutional getting bronch  HEENT PERRLA EOMI,No pallor or icterus    Neck trach    Chest occasional rhochi    CVS RRR     Abd soft     Ext No cyanosis clubbing or edema left foot in splin        Lab Data:                          8.4    11.13 )-----------( 345      ( 18 Feb 2025 00:28 )             26.3       02-18    136  |  97  |  33[H]  ----------------------------<  105[H]  4.3   |  26  |  5.01[H]    Ca    9.0      18 Feb 2025 00:29  Phos  3.8     02-18  Mg     2.6     02-18    TPro  6.4  /  Alb  2.9[L]  /  TBili  0.4  /  DBili  x   /  AST  18  /  ALT  11  /  AlkPhos  102  02-18        .Blood BLOOD  02-12-25   No growth at 5 days  --  --      Ascites Fl  02-11-25   No growth at 5 days  --    polymorphonuclear leukocytes seen  No organisms seen  by cytocentrifuge      .Sputum Sputum  02-11-25   Commensal manjit consistent with body site  --    Rare polymorphonuclear leukocytes per low power field  No Squamous epithelial cells per low power field  Rare Gram Negative Rods seen per oil power field        WBC Count: 11.13 (02-18-25 @ 00:28)  WBC Count: 10.52 (02-17-25 @ 00:51)  WBC Count: 10.07 (02-16-25 @ 00:37)  WBC Count: 9.72 (02-15-25 @ 00:07)  WBC Count: 13.34 (02-14-25 @ 00:30)  WBC Count: 14.59 (02-13-25 @ 00:20)  WBC Count: 16.84 (02-12-25 @ 00:36)  WBC Count: 16.96 (02-11-25 @ 19:34)       Bilirubin Total: 0.4 mg/dL (02-18-25 @ 00:29)  Aspartate Aminotransferase (AST/SGOT): 18 U/L (02-18-25 @ 00:29)  Alanine Aminotransferase (ALT/SGPT): 11 U/L (02-18-25 @ 00:29)  Alkaline Phosphatase: 102 U/L (02-18-25 @ 00:29)    Bilirubin Total: 0.5 mg/dL (02-13-25 @ 18:35)  Aspartate Aminotransferase (AST/SGOT): 19 U/L (02-13-25 @ 18:35)  Alanine Aminotransferase (ALT/SGPT): 11 U/L (02-13-25 @ 18:35)  Alkaline Phosphatase: 117 U/L (02-13-25 @ 18:35)    Bilirubin Total: 0.4 mg/dL (02-13-25 @ 13:02)  Aspartate Aminotransferase (AST/SGOT): 16 U/L (02-13-25 @ 13:02)  Alanine Aminotransferase (ALT/SGPT): 10 U/L (02-13-25 @ 13:02)  Alkaline Phosphatase: 111 U/L (02-13-25 @ 13:02)        < from: Xray Chest 1 View- PORTABLE-Urgent (Xray Chest 1 View- PORTABLE-Urgent .) (02.17.25 @ 13:20) >    ACC: [id number] EXAM:  XR CHEST PORTABLE ROUTINE 1V   ORDERED BY: DIEGO CHA     ACC: [id number] EXAM:  XR CHEST PORTABLE URGENT 1V   ORDERED BY: CORIE   MARIAMA     PROCEDURE DATE:  02/17/2025          INTERPRETATION:  EXAMINATION: XR CHEST URGENT, XR CHEST    CLINICAL INDICATION: assess left lung field    TECHNIQUE: Single frontal, portable view of the chest was obtained.    COMPARISON: Chest x-ray 2/16/2025 and CT chest 1/26/2025    FINDINGS:  Chest x-ray 2/17/2025 at 5:45 AM:    Enteric tube terminates in the stomach. Tracheostomy cannula in position.   Median sternotomy wires.  The heart is enlarged.  Increased opacification the left lung with very little aerated parenchyma   in the left upper lobe, attributed to left pleural effusionand   underlying consolidation/atelectasis. Congestive changes right lung   similar to prior  No acute bony abnormality.    Chest x-ray 2/17/2025 at 12:46 PM:    Slight interval improvement of aeration of left upper lobe. Otherwise no   significant interval change.    IMPRESSION:  As above    --- End of Report ---    < end of copied text >

## 2025-02-19 LAB
ALP SERPL-CCNC: 97 U/L — SIGNIFICANT CHANGE UP (ref 40–120)
ALT FLD-CCNC: 9 U/L — LOW (ref 10–45)
APTT BLD: 132 SEC — CRITICAL HIGH (ref 24.5–35.6)
APTT BLD: 46.7 SEC — HIGH (ref 24.5–35.6)
APTT BLD: 66.3 SEC — HIGH (ref 24.5–35.6)
AST SERPL-CCNC: 18 U/L — SIGNIFICANT CHANGE UP (ref 10–40)
BASOPHILS # BLD AUTO: 0.08 K/UL — SIGNIFICANT CHANGE UP (ref 0–0.2)
BASOPHILS NFR BLD AUTO: 0.7 % — SIGNIFICANT CHANGE UP (ref 0–2)
BILIRUB SERPL-MCNC: 0.4 MG/DL — SIGNIFICANT CHANGE UP (ref 0.2–1.2)
BUN SERPL-MCNC: 46 MG/DL — HIGH (ref 7–23)
CALCIUM SERPL-MCNC: 9 MG/DL — SIGNIFICANT CHANGE UP (ref 8.4–10.5)
CHLORIDE SERPL-SCNC: 94 MMOL/L — LOW (ref 96–108)
CO2 SERPL-SCNC: 26 MMOL/L — SIGNIFICANT CHANGE UP (ref 22–31)
CREAT SERPL-MCNC: 6.07 MG/DL — HIGH (ref 0.5–1.3)
CULTURE RESULTS: SIGNIFICANT CHANGE UP
EGFR: 10 ML/MIN/1.73M2 — LOW
EGFR: 10 ML/MIN/1.73M2 — LOW
EOSINOPHIL # BLD AUTO: 0.72 K/UL — HIGH (ref 0–0.5)
EOSINOPHIL NFR BLD AUTO: 6.5 % — HIGH (ref 0–6)
GLUCOSE SERPL-MCNC: 113 MG/DL — HIGH (ref 70–99)
HCT VFR BLD CALC: 26.5 % — LOW (ref 39–50)
HGB BLD-MCNC: 8.2 G/DL — LOW (ref 13–17)
IMM GRANULOCYTES NFR BLD AUTO: 0.9 % — SIGNIFICANT CHANGE UP (ref 0–0.9)
INR BLD: 1.21 RATIO — HIGH (ref 0.85–1.16)
LYMPHOCYTES # BLD AUTO: 0.86 K/UL — LOW (ref 1–3.3)
LYMPHOCYTES # BLD AUTO: 7.7 % — LOW (ref 13–44)
MAGNESIUM SERPL-MCNC: 3 MG/DL — HIGH (ref 1.6–2.6)
MCHC RBC-ENTMCNC: 30.7 PG — SIGNIFICANT CHANGE UP (ref 27–34)
MCHC RBC-ENTMCNC: 30.9 G/DL — LOW (ref 32–36)
MCV RBC AUTO: 99.3 FL — SIGNIFICANT CHANGE UP (ref 80–100)
MONOCYTES # BLD AUTO: 0.87 K/UL — SIGNIFICANT CHANGE UP (ref 0–0.9)
MONOCYTES NFR BLD AUTO: 7.8 % — SIGNIFICANT CHANGE UP (ref 2–14)
NEUTROPHILS # BLD AUTO: 8.5 K/UL — HIGH (ref 1.8–7.4)
NEUTROPHILS NFR BLD AUTO: 76.4 % — SIGNIFICANT CHANGE UP (ref 43–77)
NRBC BLD AUTO-RTO: 0 /100 WBCS — SIGNIFICANT CHANGE UP (ref 0–0)
PHOSPHATE SERPL-MCNC: 4.7 MG/DL — HIGH (ref 2.5–4.5)
PLATELET # BLD AUTO: 314 K/UL — SIGNIFICANT CHANGE UP (ref 150–400)
POTASSIUM SERPL-MCNC: 4.6 MMOL/L — SIGNIFICANT CHANGE UP (ref 3.5–5.3)
POTASSIUM SERPL-SCNC: 4.6 MMOL/L — SIGNIFICANT CHANGE UP (ref 3.5–5.3)
PROT SERPL-MCNC: 6.3 G/DL — SIGNIFICANT CHANGE UP (ref 6–8.3)
PROTHROM AB SERPL-ACNC: 13.8 SEC — HIGH (ref 9.9–13.4)
RBC # BLD: 2.67 M/UL — LOW (ref 4.2–5.8)
RBC # FLD: 21.5 % — HIGH (ref 10.3–14.5)
SODIUM SERPL-SCNC: 136 MMOL/L — SIGNIFICANT CHANGE UP (ref 135–145)
SPECIMEN SOURCE: SIGNIFICANT CHANGE UP
SPECIMEN SOURCE: SIGNIFICANT CHANGE UP
WBC # BLD: 11.13 K/UL — HIGH (ref 3.8–10.5)
WBC # FLD AUTO: 11.13 K/UL — HIGH (ref 3.8–10.5)

## 2025-02-19 PROCEDURE — 71045 X-RAY EXAM CHEST 1 VIEW: CPT | Mod: 26

## 2025-02-19 PROCEDURE — G0545: CPT

## 2025-02-19 PROCEDURE — 99291 CRITICAL CARE FIRST HOUR: CPT

## 2025-02-19 PROCEDURE — 99232 SBSQ HOSP IP/OBS MODERATE 35: CPT

## 2025-02-19 RX ORDER — FENTANYL CITRATE-0.9 % NACL/PF 100MCG/2ML
25 SYRINGE (ML) INTRAVENOUS ONCE
Refills: 0 | Status: DISCONTINUED | OUTPATIENT
Start: 2025-02-19 | End: 2025-02-19

## 2025-02-19 RX ORDER — TRAZODONE HCL 100 MG
25 TABLET ORAL ONCE
Refills: 0 | Status: COMPLETED | OUTPATIENT
Start: 2025-02-19 | End: 2025-02-19

## 2025-02-19 RX ADMIN — AMIODARONE HYDROCHLORIDE 200 MILLIGRAM(S): 50 INJECTION, SOLUTION INTRAVENOUS at 06:11

## 2025-02-19 RX ADMIN — Medication 40 MILLIGRAM(S): at 12:28

## 2025-02-19 RX ADMIN — Medication 25 MICROGRAM(S): at 14:43

## 2025-02-19 RX ADMIN — METHOCARBAMOL 500 MILLIGRAM(S): 500 TABLET, FILM COATED ORAL at 06:12

## 2025-02-19 RX ADMIN — Medication 1 TABLET(S): at 12:28

## 2025-02-19 RX ADMIN — Medication 300 MILLIGRAM(S): at 12:29

## 2025-02-19 RX ADMIN — ATORVASTATIN CALCIUM 80 MILLIGRAM(S): 80 TABLET, FILM COATED ORAL at 22:18

## 2025-02-19 RX ADMIN — HEPARIN SODIUM 12.5 UNIT(S)/HR: 1000 INJECTION INTRAVENOUS; SUBCUTANEOUS at 20:25

## 2025-02-19 RX ADMIN — Medication 81 MILLIGRAM(S): at 12:28

## 2025-02-19 RX ADMIN — Medication 50 MILLIGRAM(S): at 22:40

## 2025-02-19 RX ADMIN — METOPROLOL SUCCINATE 25 MILLIGRAM(S): 50 TABLET, EXTENDED RELEASE ORAL at 06:11

## 2025-02-19 RX ADMIN — HEPARIN SODIUM 12.5 UNIT(S)/HR: 1000 INJECTION INTRAVENOUS; SUBCUTANEOUS at 11:30

## 2025-02-19 RX ADMIN — EPOETIN ALFA 10000 UNIT(S): 10000 SOLUTION INTRAVENOUS; SUBCUTANEOUS at 13:14

## 2025-02-19 RX ADMIN — MIRTAZAPINE 7.5 MILLIGRAM(S): 30 TABLET, FILM COATED ORAL at 22:18

## 2025-02-19 RX ADMIN — Medication 15 MILLILITER(S): at 17:01

## 2025-02-19 RX ADMIN — Medication 500 MILLIGRAM(S): at 12:29

## 2025-02-19 RX ADMIN — Medication 1 APPLICATION(S): at 22:19

## 2025-02-19 RX ADMIN — Medication 25 MILLIGRAM(S): at 22:41

## 2025-02-19 RX ADMIN — Medication 1 APPLICATION(S): at 06:37

## 2025-02-19 RX ADMIN — METOPROLOL SUCCINATE 25 MILLIGRAM(S): 50 TABLET, EXTENDED RELEASE ORAL at 17:01

## 2025-02-19 RX ADMIN — METHOCARBAMOL 500 MILLIGRAM(S): 500 TABLET, FILM COATED ORAL at 17:00

## 2025-02-19 RX ADMIN — HEPARIN SODIUM 13 UNIT(S)/HR: 1000 INJECTION INTRAVENOUS; SUBCUTANEOUS at 08:38

## 2025-02-19 RX ADMIN — Medication 1 SPRAY(S): at 06:12

## 2025-02-19 RX ADMIN — Medication 25 MICROGRAM(S): at 14:28

## 2025-02-19 NOTE — PROGRESS NOTE ADULT - ASSESSMENT
54M with HTN, HLD, ESRD on HD MWF via LUE AVF, ascites (requiring repeat paracenteses q4-6wks), NICM with moderate LV dysfunction w/ EF 35-40%() and CAD s/p atherectomy + SALLIE to D1 (2024) presents to [facility] 2024 as transfer from [facility] (via [facility]) where he presented  with SOB.   In [facility] ED, pt was hypoxic to 86% on RA, trop 1.7K, EKG no new changes, CXR fluid overload. Admitted for AHRF 2/2 fluid overload. Pt received HD on , ,  and . DAPT was resumed, TTE showed improvement in LVEF to 40-45%. Stress test was abnormal with 1mm inferior STD, reversible ischemia in the inferior wall and fixed defects in the lateral, anterior and apical walls with post stress EF of 24%. Pt was then transferred to [facility] for cardiac cath which showed pLAD 70% ISR, mLCx 70%, D1 severe dz. Patient now transferred to [facility] 2024 for CABG.       - underwent  C3L free LIMA-LAD; SVG-Diag; SVG-leftPL   - extubated   - hypotension and ECHO showing Systolic HF/depressed BIV Function, currently supported with /pressors.  Labs this evening showing transaminitis   - hypotensive, febrile and reintubated  1/3 - cultures (+) E coli +ESBL bacteremia   - underwent tracheostomy   - left lung collapse s/p bronchoscopy   - seen by plastic surgery / colorectal for sacral wound, no surgical intervention, no evidence of fistula, BC sent  - c/o right arm  pain, started on acyclovir for positive HSV PCR     doing a little better tissue culture with serratia; bronch also growing some yeast and VREC   back with nodule with some discharge    sacral decubitus less tender, some bloody secretions from trach  - still with drainage from back lesion  s/p I & D of lesion and sacrum   noted left foot numness , foot drop   2/10 - completed abs for back- dried up  local care for decubitus. thoracic surgery to assess left diaphragm    jump in WBC   WBC slightly less    Recommendations  - completed linezolid course  -completed meropenem  completed abs for back   - surgical f/u appreciated   - check cultures from bronch  -neuro input appreciated, leg less numb  - local care for sacral decubitus, more comfortable   - last paracentesis not c/w infection   for further suctioning and evaluation of lung  completed another course of  meropenem -   changed lines   ? repeat echo      Marla Champion M.D. ,   please reach via teams   If no answer, or after 5PM/ weekends,  then please call  [phone]    Assessment and plan discussed with the primary team .

## 2025-02-19 NOTE — PROGRESS NOTE ADULT - ASSESSMENT
54M with PMH of HTN, HLD, ESRD on HD MWF via LUE AVF, ascites (requiring repeat paracenteses q4-6wks), NICM with moderate LV dysfunction w/ EF 35-40%() and CAD s/p atherectomy + SALLIE to D1(2024) presents to [facility] transferred from [facility]. Patient initially presented to [facility] ED with shortness of breath. Of note he was recently admitted from - for acute cholecystitis s/p cholecystectomy. In [facility] ED, pt was hypoxic to 86% on RA, trop 1.7K, EKG no new changes, CXR fluid overload. Admitted for AHRF 2/2 fluid overload. Pt received HD on , ,  and . DAPT was resumed, TTE showed improvement in LVEF to 40-45%. Stress test was abnormal with 1mm inferior STD, reversible ischemia in the inferior wall and fixed defects in the lateral, anterior and apical walls with post stress EF of 24%.     Pt was then transferred to [facility] for cardiac cath which showed pLAD 70% ISR, mLCx 70%, D1 severe dz. Patient now transferred to [facility] CTS Dr. Rivers for CABG eval.# CAD s/p NSTEMI  Hx CAD s/p PCI LAD   Presented with ADHF elevated troponins  Positive NST1-Cath- pLAD 70% ISR, mLCx 70%, D1 severe dz.   TTE EF 35% Severe TRWas on Plavix-[id number] 321 been off Plavix since -POD#1-C3L free LIMA-LAD; SVG-Diag; CER-llhrTG-Hqzgjaqad on  Sinus rhythm,Amio for AF prophylaxis  -Awake noted AF RVR no further bradyarrhythmias-Started BBlockers  02/10-Persistent AF tolerating Amio BBlockers Pericardial Effusion decreased consider resuming AC  -Seen by Thoracic-? Diaphragmatic plication,AF on HD  -Sniff test +  02/15-Awake AF,   -Tolerating TC-remains in AF/AFl is ambulating  -On HFTC ambulating AFl ~~80;s on heparin gtt  -s/p Bronch AFl ~~90's on Vent HS TC daytime        # Acute on Chronic Systolic Heart Failure now improved  EF-35% ,severe TR  Had HD post op  GDMT post op  LVEF improved post bypass   -TTE EF 40%,trace effusion  ECG c/w pericarditis-was on colchicine   01/15-TTE EF 55%  -TTE EF 60%   -Normal LV systolic function Moderate pericardial effusion  -On TC-HF and Vent support at nights   02/10-Vent HS with T Collar trial Ambulated Remains in AF  Repeat TTE Normal LV Systolic function Small Pericardial effusion decreased from 2/3        Vascular evaluated  AVGraft /?steal causing RV failure-'' Very low suspicion of steal Sd and AVF causing current clinical state. ''   VA Duplex Hemodialysis Access, Left (25 @ 13:35) >   Arterial flow volume of 1301 mL/m, and the venous flow volume of  1492 mL/m are consistent with a normally functioning dialysis access  fistula.      # Ascites  Hx chronic ascites  Has drainage q4-6 weeks  Last drained -4600mL  ? ascites related to severe TR  Had owjhorhtjbbg-Kixujjc-ni growth   CT Abdomen 01/10-Large volume ascites-  US abdomen--Small to moderate volume abdominal/pelvic ascites  US abdomen  Moderate ascites    # ESRD  Now off CRRT transition to HD

## 2025-02-19 NOTE — PROGRESS NOTE ADULT - SUBJECTIVE AND OBJECTIVE BOX
Patient is a 54y old  Male who presents with a chief complaint of CAD s/p CABG (19 Feb 2025 06:39)    Being followed by ID for        Interval history:  No other acute events      ROS:  No cough,SOB,CP  No N/V/D  No abd pain  No urinary complaints  No HA  No joint or limb pain  No other complaints    PAST MEDICAL & SURGICAL HISTORY:  Type 2 diabetes mellitus      Hypertension      End stage renal disease  started HD 2/2019 T, Th, Sat via right chest permacath      Bone spur  right shoulder- hx of - sx done      Anemia      Injury of right wrist  hx of at age 14      History of hyperkalemia  before HD- K-6.2 - to repeat in am of sx, pt had HD today      S/P arthroscopy of right shoulder  2010      History of vascular access device  right chest permacath - 2/2019      H/O right wrist surgery  tendons repair - at age 14      AV fistula      H/O ventral hernia repair      S/P cholecystectomy        Allergies    No Known Allergies    Intolerances      Antimicrobials:      MEDICATIONS  (STANDING):  aMIOdarone    Tablet 200 milliGRAM(s) Oral daily  ascorbic acid 500 milliGRAM(s) Oral daily  aspirin  chewable 81 milliGRAM(s) Oral daily  atorvastatin 80 milliGRAM(s) Oral at bedtime  chlorhexidine 0.12% Liquid 15 milliLiter(s) Oral Mucosa every 12 hours  chlorhexidine 2% Cloths 1 Application(s) Topical daily  chlorhexidine 4% Liquid 1 Application(s) Topical <User Schedule>  dextrose 50% Injectable 50 milliLiter(s) IV Push every 15 minutes  epoetin ignacia (EPOGEN) Injectable 06674 Unit(s) IV Push <User Schedule>  ferrous    sulfate Liquid 300 milliGRAM(s) Enteral Tube daily  heparin  Infusion 500 Unit(s)/Hr (12.5 mL/Hr) IV Continuous <Continuous>  insulin lispro (ADMELOG) corrective regimen sliding scale   SubCutaneous every 6 hours  melatonin 5 milliGRAM(s) Oral at bedtime  methocarbamol 500 milliGRAM(s) Oral every 8 hours  metoprolol tartrate 25 milliGRAM(s) Oral two times a day  mirtazapine 7.5 milliGRAM(s) Oral at bedtime  Nephro-elicia 1 Tablet(s) Oral daily  pantoprazole  Injectable 40 milliGRAM(s) IV Push daily  sodium chloride 0.65% Nasal 1 Spray(s) Both Nostrils every 12 hours  sodium chloride 0.9%. 1000 milliLiter(s) (10 mL/Hr) IV Continuous <Continuous>  sodium zirconium cyclosilicate 10 Gram(s) Oral <User Schedule>  traZODone 50 milliGRAM(s) Oral at bedtime      Vital Signs Last 24 Hrs  T(C): 36.2 (02-19-25 @ 10:55), Max: 37.4 (02-18-25 @ 20:00)  T(F): 97.1 (02-19-25 @ 10:55), Max: 99.3 (02-18-25 @ 20:00)  HR: 78 (02-19-25 @ 11:00) (54 - 81)  BP: 151/73 (02-19-25 @ 11:00) (117/61 - 173/82)  BP(mean): 105 (02-19-25 @ 11:00) (84 - 119)  RR: 15 (02-19-25 @ 11:00) (10 - 25)  SpO2: 98% (02-19-25 @ 11:00) (80% - 100%)    Physical Exam:    Constitutional well preserved,comfortable,pleasant    HEENT PERRLA EOMI,No pallor or icterus    No oral exudate or erythema    Neck supple no JVD or LN    Chest Good AE,CTA    CVS RRR S1 S2 WNl No murmur or rub or gallop    Abd soft BS normal No tenderness no masses    Ext No cyanosis clubbing or edema    IV site no erythema tenderness or discharge    Joints no swelling or LOM    CNS AAO X 3 no focal    Lab Data:                          8.2    11.13 )-----------( 314      ( 19 Feb 2025 00:29 )             26.5       02-19    136  |  94[L]  |  46[H]  ----------------------------<  113[H]  4.6   |  26  |  6.07[H]    Ca    9.0      19 Feb 2025 00:32  Phos  4.7     02-19  Mg     3.0     02-19    TPro  6.3  /  Alb  2.7[L]  /  TBili  0.4  /  DBili  x   /  AST  18  /  ALT  9[L]  /  AlkPhos  97  02-19      Urinalysis Basic - ( 19 Feb 2025 00:32 )    Color: x / Appearance: x / SG: x / pH: x  Gluc: 113 mg/dL / Ketone: x  / Bili: x / Urobili: x   Blood: x / Protein: x / Nitrite: x   Leuk Esterase: x / RBC: x / WBC x   Sq Epi: x / Non Sq Epi: x / Bacteria: x        Bronchial  02-18-25   Commensal manjit consistent with body site  --    Few polymorphonuclear leukocytes per low power field  Rare Squamous epithelial cells per low power field  Few Gram Positive Cocci in Pairs and Chains per oil power field  Rare Gram Positive Cocci in Clusters per oil power field      .Blood BLOOD  02-12-25   No growth at 5 days  --  --                    WBC Count: 11.13 (02-19-25 @ 00:29)  WBC Count: 11.13 (02-18-25 @ 00:28)  WBC Count: 10.52 (02-17-25 @ 00:51)  WBC Count: 10.07 (02-16-25 @ 00:37)  WBC Count: 9.72 (02-15-25 @ 00:07)  WBC Count: 13.34 (02-14-25 @ 00:30)  WBC Count: 14.59 (02-13-25 @ 00:20)       Bilirubin Total: 0.4 mg/dL (02-19-25 @ 00:32)  Aspartate Aminotransferase (AST/SGOT): 18 U/L (02-19-25 @ 00:32)  Alanine Aminotransferase (ALT/SGPT): 9 U/L (02-19-25 @ 00:32)  Alkaline Phosphatase: 97 U/L (02-19-25 @ 00:32)  Bilirubin Total: 0.4 mg/dL (02-18-25 @ 00:29)  Aspartate Aminotransferase (AST/SGOT): 18 U/L (02-18-25 @ 00:29)  Alanine Aminotransferase (ALT/SGPT): 11 U/L (02-18-25 @ 00:29)  Alkaline Phosphatase: 102 U/L (02-18-25 @ 00:29)  Bilirubin Total: 0.4 mg/dL (02-17-25 @ 00:52)  Aspartate Aminotransferase (AST/SGOT): 19 U/L (02-17-25 @ 00:52)  Alanine Aminotransferase (ALT/SGPT): 9 U/L (02-17-25 @ 00:52)  Alkaline Phosphatase: 100 U/L (02-17-25 @ 00:52)  Bilirubin Total: 0.3 mg/dL (02-16-25 @ 00:36)  Aspartate Aminotransferase (AST/SGOT): 17 U/L (02-16-25 @ 00:36)  Alanine Aminotransferase (ALT/SGPT): 8 U/L (02-16-25 @ 00:36)  Alkaline Phosphatase: 101 U/L (02-16-25 @ 00:36)  Bilirubin Total: 0.4 mg/dL (02-15-25 @ 00:07)  Aspartate Aminotransferase (AST/SGOT): 39 U/L (02-15-25 @ 00:07)  Alanine Aminotransferase (ALT/SGPT): 13 U/L (02-15-25 @ 00:07)  Alkaline Phosphatase: 109 U/L (02-15-25 @ 00:07)         Patient is a 54y old  Male who presents with a chief complaint of CAD s/p CABG (19 Feb 2025 06:39)    Being followed by ID for        Interval history:  pt resting getting HD  remains afebriel  No other acute events          PAST MEDICAL & SURGICAL HISTORY:  Type 2 diabetes mellitus      Hypertension      End stage renal disease  started HD 2/2019 T, Th, Sat via right chest permacath      Bone spur  right shoulder- hx of - sx done      Anemia      Injury of right wrist  hx of at age 14      History of hyperkalemia  before HD- K-6.2 - to repeat in am of sx, pt had HD today      S/P arthroscopy of right shoulder  2010      History of vascular access device  right chest permacath - 2/2019      H/O right wrist surgery  tendons repair - at age 14      AV fistula      H/O ventral hernia repair      S/P cholecystectomy        Allergies    No Known Allergies    Intolerances      Antimicrobials:      MEDICATIONS  (STANDING):  aMIOdarone    Tablet 200 milliGRAM(s) Oral daily  ascorbic acid 500 milliGRAM(s) Oral daily  aspirin  chewable 81 milliGRAM(s) Oral daily  atorvastatin 80 milliGRAM(s) Oral at bedtime  chlorhexidine 0.12% Liquid 15 milliLiter(s) Oral Mucosa every 12 hours  chlorhexidine 2% Cloths 1 Application(s) Topical daily  chlorhexidine 4% Liquid 1 Application(s) Topical <User Schedule>  dextrose 50% Injectable 50 milliLiter(s) IV Push every 15 minutes  epoetin ignacia (EPOGEN) Injectable 11893 Unit(s) IV Push <User Schedule>  ferrous    sulfate Liquid 300 milliGRAM(s) Enteral Tube daily  heparin  Infusion 500 Unit(s)/Hr (12.5 mL/Hr) IV Continuous <Continuous>  insulin lispro (ADMELOG) corrective regimen sliding scale   SubCutaneous every 6 hours  melatonin 5 milliGRAM(s) Oral at bedtime  methocarbamol 500 milliGRAM(s) Oral every 8 hours  metoprolol tartrate 25 milliGRAM(s) Oral two times a day  mirtazapine 7.5 milliGRAM(s) Oral at bedtime  Nephro-elicia 1 Tablet(s) Oral daily  pantoprazole  Injectable 40 milliGRAM(s) IV Push daily  sodium chloride 0.65% Nasal 1 Spray(s) Both Nostrils every 12 hours  sodium chloride 0.9%. 1000 milliLiter(s) (10 mL/Hr) IV Continuous <Continuous>  sodium zirconium cyclosilicate 10 Gram(s) Oral <User Schedule>  traZODone 50 milliGRAM(s) Oral at bedtime      Vital Signs Last 24 Hrs  T(C): 36.2 (02-19-25 @ 10:55), Max: 37.4 (02-18-25 @ 20:00)  T(F): 97.1 (02-19-25 @ 10:55), Max: 99.3 (02-18-25 @ 20:00)  HR: 78 (02-19-25 @ 11:00) (54 - 81)  BP: 151/73 (02-19-25 @ 11:00) (117/61 - 173/82)  BP(mean): 105 (02-19-25 @ 11:00) (84 - 119)  RR: 15 (02-19-25 @ 11:00) (10 - 25)  SpO2: 98% (02-19-25 @ 11:00) (80% - 100%)    Physical Exam:    Constitutional well preserved,comfortable,pleasant    HEENT PERRLA EOMI,No pallor or icterus    No oral exudate or erythema    Neck supple no JVD or LN    Chest Good AE,CTA    CVS  S1 S2     Abd soft BS normal No tenderness     Ext No cyanosis clubbing or edema    IV site no erythema tenderness or discharge    Joints no swelling or LOM    CNS AAO X 3 no focal    Lab Data:                          8.2    11.13 )-----------( 314      ( 19 Feb 2025 00:29 )             26.5       02-19    136  |  94[L]  |  46[H]  ----------------------------<  113[H]  4.6   |  26  |  6.07[H]    Ca    9.0      19 Feb 2025 00:32  Phos  4.7     02-19  Mg     3.0     02-19    TPro  6.3  /  Alb  2.7[L]  /  TBili  0.4  /  DBili  x   /  AST  18  /  ALT  9[L]  /  AlkPhos  97  02-19          Bronchial  02-18-25   Commensal mnajit consistent with body site  --    Few polymorphonuclear leukocytes per low power field  Rare Squamous epithelial cells per low power field  Few Gram Positive Cocci in Pairs and Chains per oil power field  Rare Gram Positive Cocci in Clusters per oil power field      .Blood BLOOD  02-12-25   No growth at 5 days  --  --      WBC Count: 11.13 (02-19-25 @ 00:29)  WBC Count: 11.13 (02-18-25 @ 00:28)  WBC Count: 10.52 (02-17-25 @ 00:51)  WBC Count: 10.07 (02-16-25 @ 00:37)  WBC Count: 9.72 (02-15-25 @ 00:07)  WBC Count: 13.34 (02-14-25 @ 00:30)  WBC Count: 14.59 (02-13-25 @ 00:20)       Bilirubin Total: 0.4 mg/dL (02-19-25 @ 00:32)  Aspartate Aminotransferase (AST/SGOT): 18 U/L (02-19-25 @ 00:32)  Alanine Aminotransferase (ALT/SGPT): 9 U/L (02-19-25 @ 00:32)  Alkaline Phosphatase: 97 U/L (02-19-25 @ 00:32)    < from: US Abdomen Upper Quadrant Right (02.18.25 @ 14:04) >  INTERPRETATION:  CLINICAL INFORMATION: Upper abdominal pain.    COMPARISON: Limited abdominal ultrasound 2/8/2025    TECHNIQUE: Sonography of the right upper quadrant.    FINDINGS:  Liver: Normal in size and echogenicity. No focal lesions are identified.  Bile ducts: Normal caliber. Common bile duct measures 7 mm.  Gallbladder: Cholecystectomy.  Pancreas: Poorly visualized.  Right kidney: Atrophic 7.8 cm. No hydronephrosis.  Ascites: Moderate volume ascites.  IVC: Visualized portions are within normal limits.  Miscellaneous: Small right pleural effusion.    IMPRESSION:    *  Moderate volume ascites.  *  Small right pleural effusion.    < end of copied text >

## 2025-02-19 NOTE — PROGRESS NOTE ADULT - SUBJECTIVE AND OBJECTIVE BOX
MR#[id number]  PATIENT NAME:RAAD CANO    DATE OF SERVICE: 02-19-25 @ 06:40  Patient was seen and examined by Solo Quick MD on    02-19-25 @ 06:40 .  Interim events noted.Consultant notes ,Labs,Telemetry reviewed by me       HOSPITAL COURSE: HPI:  54M with PMH of HTN, HLD, ESRD on HD MWF via LUE AVF, ascites (requiring repeat paracenteses q4-6wks), NICM with moderate LV dysfunction w/ EF 35-40%(2023) and CAD s/p atherectomy + SALLIE to D1(4/11/2024) presents to [facility] transferred from [facility]. Patient [facility] ED with shortness of breath. Of note he was recently admitted from 09/27-12/02 for acute cholecystitis s/p cholecystectomy. In [facility] ED, pt was hypoxic to 86% on RA, trop 1.7K, EKG no new changes, CXR fluid overload. Admitted for AHRF 2/2 fluid overload. Pt received HD on 12/18, 12/19, 12/20 and 12/22. DAPT was resumed, TTE showed improvement in LVEF to 40-45%. Stress test was abnormal with 1mm inferior STD, reversible ischemia in the inferior wall and fixed defects in the lateral, anterior and apical walls with post stress EF of 24%. Pt was then transferred to [facility] for cardiac cath which showed pLAD 70% ISR, mLCx 70%, D1 severe dz. Patient now transferred to [facility] CTS Dr. Rivers for CABG eval. Patient denies any current SOB or chest pain on admission. Otherwise denies headaches, abdominal pain, urinary or bowel changes, fevers, chills, N/V/D, sick contacts.  (24 Dec 2024 05:07)      INTERIM EVENTS:Patient seen at bedside ,interim events noted.  12/23 Cardiac cath  pLAD 70% ISR, mLCx 70%, D1 severe dz.   12/31-POD#1-C3L free LIMA-LAD; SVG-Diag; SVG-leftPL Awake OOB extubated Sinus rhythm on Dobutamine gtt  02/04-Awake alert on T collar-TC during the day and PC: 12/10//15//50% at night-interrmittent rapid AF noted  02/07-Awake had Bronch yesterday-  02/08-Seen by [facility] for LLE weakness appears peripheral neuropathy  02/10-Awake on T Collar trial is ambulsting-AF  02/12-Awake still c/o orthopnea   02/14-sniff test  showed paradoxical diaphragmatic motion    02/15-Awake AF   02/16-AWake c/o buttock pain AF on T Collar  02/17-Awake tolerating T Collar is ambulating,AF  02/18-Awake on HFTC Atrial Flutter~~80's  02/19 Had Bronch yesterday on vent HS remains in Atrial Flutter          MEDICATIONS  (STANDING):  aMIOdarone    Tablet 200 milliGRAM(s) Oral daily  ascorbic acid 500 milliGRAM(s) Oral daily  aspirin  chewable 81 milliGRAM(s) Oral daily  atorvastatin 80 milliGRAM(s) Oral at bedtime  chlorhexidine 0.12% Liquid 15 milliLiter(s) Oral Mucosa every 12 hours  chlorhexidine 2% Cloths 1 Application(s) Topical daily  chlorhexidine 4% Liquid 1 Application(s) Topical <User Schedule>  dextrose 50% Injectable 50 milliLiter(s) IV Push every 15 minutes  epoetin ignacia (EPOGEN) Injectable 97887 Unit(s) IV Push <User Schedule>  ferrous    sulfate Liquid 300 milliGRAM(s) Enteral Tube daily  heparin  Infusion 500 Unit(s)/Hr (13 mL/Hr) IV Continuous <Continuous>  insulin lispro (ADMELOG) corrective regimen sliding scale   SubCutaneous every 6 hours  melatonin 5 milliGRAM(s) Oral at bedtime  methocarbamol 500 milliGRAM(s) Oral every 8 hours  metoprolol tartrate 25 milliGRAM(s) Oral two times a day  mirtazapine 7.5 milliGRAM(s) Oral at bedtime  Nephro-elicia 1 Tablet(s) Oral daily  pantoprazole  Injectable 40 milliGRAM(s) IV Push daily  sodium chloride 0.65% Nasal 1 Spray(s) Both Nostrils every 12 hours  sodium chloride 0.9%. 1000 milliLiter(s) (10 mL/Hr) IV Continuous <Continuous>  sodium zirconium cyclosilicate 10 Gram(s) Oral <User Schedule>  traZODone 50 milliGRAM(s) Oral at bedtime    MEDICATIONS  (PRN):  acetaminophen     Tablet .. 650 milliGRAM(s) Oral every 6 hours PRN Mild Pain (1 - 3)  lidocaine/prilocaine Cream 1 Application(s) Topical daily PRN pre-HD  sodium chloride 0.9% lock flush 10 milliLiter(s) IV Push every 1 hour PRN Pre/post blood products, medications, blood draw, and to maintain line patency            REVIEW OF SYSTEMS:  Constitutional: [ ] fever, [ ]weight loss,  [ ]fatigue [ ]weight gain  Eyes: [ ] visual changes  Respiratory: [ ]shortness of breath;  [ ] cough, [ ]wheezing, [ ]chills, [ ]hemoptysis  Cardiovascular: [ ] chest pain, [ ]palpitations, [ ]dizziness,  [ ]leg swelling[ ]orthopnea[ ]PND  Gastrointestinal: [ ] abdominal pain, [ ]nausea, [ ]vomiting,  [ ]diarrhea [ ]Constipation [ ]Melena  Genitourinary: [ ] dysuria, [ ] hematuria [ ]Vigil  Neurologic: [ ] headaches [ ] tremors[ ]weakness [ ]Paralysis Right[ ] Left[ ]  Skin: [ ] itching, [ ]burning, [ ] rashes  Endocrine: [ ] heat or cold intolerance  Musculoskeletal: [ ] joint pain or swelling; [ ] muscle, back, or extremity pain  Psychiatric: [ ] depression, [ ]anxiety, [ ]mood swings, or [ ]difficulty sleeping  Hematologic: [ ] easy bruising, [ ] bleeding gums    [ ] All remaining systems negative except as per above.   [ ]Unable to obtain.  [x] No change in ROS since admission      Vital Signs Last 24 Hrs  T(C): 36.6 (19 Feb 2025 04:00), Max: 37.4 (18 Feb 2025 20:00)  T(F): 97.9 (19 Feb 2025 04:00), Max: 99.3 (18 Feb 2025 20:00)  HR: 58 (19 Feb 2025 04:00) (54 - 92)  BP: 158/76 (19 Feb 2025 04:00) (129/66 - 173/82)  BP(mean): 109 (19 Feb 2025 04:00) (92 - 119)  RR: 12 (19 Feb 2025 04:00) (10 - 25)  SpO2: 100% (19 Feb 2025 04:00) (80% - 100%)    Parameters below as of 19 Feb 2025 04:00  Patient On (Oxygen Delivery Method): ventilator    O2 Concentration (%): 40  I&O's Summary    17 Feb 2025 07:01  -  18 Feb 2025 07:00  --------------------------------------------------------  IN: 778 mL / OUT: 0 mL / NET: 778 mL    18 Feb 2025 07:01  -  19 Feb 2025 06:40  --------------------------------------------------------  IN: 966 mL / OUT: 0 mL / NET: 966 mL        PHYSICAL EXAM:  General: No acute distress BMI-28  HEENT: EOMI, PERRL  Neck: Supple, [ ] JVD  Lungs: Equal air entry bilaterally; [ ] rales [ ] wheezing [ ] rhonchi  Heart: Irregular rate and rhythm; [x ] murmur   2/6 [ x] systolic [ ] diastolic [ ] radiation[ ] rubs [ ]  gallops  Abdomen: Nontender, bowel sounds present  Extremities: No clubbing, cyanosis, [ ] edema [ ]Pulses  equal and intact  Nervous system:  Alert & Oriented X3, no focal deficits  Psychiatric: Normal affect  Skin: No rashes or lesions    LABS:  02-19    136  |  94[L]  |  46[H]  ----------------------------<  113[H]  4.6   |  26  |  6.07[H]    Ca    9.0      19 Feb 2025 00:32  Phos  4.7     02-19  Mg     3.0     02-19    TPro  6.3  /  Alb  2.7[L]  /  TBili  0.4  /  DBili  x   /  AST  18  /  ALT  9[L]  /  AlkPhos  97  02-19    Creatinine Trend: 6.07<--, 5.01<--, 4.05<--, 2.93<--, 3.29<--, 4.44<--                        8.2    11.13 )-----------( 314      ( 19 Feb 2025 00:29 )             26.5     PT/INR - ( 19 Feb 2025 00:29 )   PT: 13.8 sec;   INR: 1.21 ratio         PTT - ( 19 Feb 2025 02:26 )  PTT:46.7 sec         TTE Limited W or WO Ultrasound Enhancing Agent (02.18.25 @ 13:54) >  CONCLUSIONS:      1. Left ventricular systolic function is normal.   2. Enlarged right ventricular cavity size and mildly reduced right ventricular systolic function.   3. Left pleural effusion noted.   4. Trace pericardial effusion.       US Abdomen Upper Quadrant Right (02.18.25 @ 14:04) >  IMPRESSION:  *  Moderate volume ascites.  *  Small right pleural effusion.       Xray Chest 1 View- PORTABLE-Urgent (Xray Chest 1 View- PORTABLE-Urgent .) (02.18.25 @ 13:44) >  IMPRESSION:  Mildly improved aeration of the left upper lung field, with near complete  opacification of the left lung likely representing a large pleural effusion and/or atelectasis.

## 2025-02-19 NOTE — PROGRESS NOTE ADULT - ASSESSMENT
54M with PMH of HTN, HLD, ESRD on HD MWF via LUE AVF, ascites (requiring repeat paracenteses q4-6wks), NICM with moderate LV dysfunction w/ EF 35-40%(2023) and CAD s/p atherectomy + SALLIE to D1(4/11/2024) presents to [facility] transferred from [facility] for CABG eval  Nephro on [facility] for HD    ESRD on HD   Nephrologist Dr. Bailey  MWF Schedule / Access:  LUE AVF  Center:  [facility]  Pt refused HD 2/18 as wants to return to MW schedule  Pt s/p HD on 2/19   Keep MAP>65  Renal Diet  HD consent in chart     Hyper/Hypokalemia  Pt intermittently Hyperkalemia  HD as scheduled  now improved  Monitor     HTN  BP fluctuating   Monitor BP     CKD MBD  Check PTH   Monitor Ca , PO4 daily     Anemia  CRISTIANE with HD  Transfuse if hgb <7    CAD with multivessel disease  CTICU following  s/p CABG 12/30    Hypoxic respiratory  failure requiring Trach  Per surgery  S/p bronchoscopy, pulm following X Size Of Lesion In Cm (Optional): 0

## 2025-02-19 NOTE — PROGRESS NOTE ADULT - SUBJECTIVE AND OBJECTIVE BOX
[facility]   MD DREW VIZCARRA MD SAKIL BHUIYAN, MD BRIANA PETITO, NP    TEL:  FROM 9 AM to 5 PM ---OFFICE: [phone]  FROM 5 PM - 9 AM PLEASE CALL ANSWERING SERVICE: [phone]    RENAL PROGRESS NOTE: DATE OF SERVICE 02-19-25 @ 16:12  Patient is a 54y old  Male who presents with a chief complaint of CAD s/p CABG   Patient seen and examined at bedside. No chest pain/sob    VITALS:  T(F): 97 (02-19-25 @ 13:55), Max: 99.3 (02-18-25 @ 20:00)  HR: 55 (02-19-25 @ 15:36)  BP: 135/74 (02-19-25 @ 14:00)  RR: 44 (02-19-25 @ 14:00)  SpO2: 100% (02-19-25 @ 15:36)  Wt(kg): --    02-18 @ 07:01  -  02-19 @ 07:00  --------------------------------------------------------  IN: 1102 mL / OUT: 0 mL / NET: 1102 mL    02-19 @ 07:01  -  02-19 @ 16:12  --------------------------------------------------------  IN: 467 mL / OUT: 1500 mL / NET: -1033 mL      PHYSICAL EXAM:  Constitutional: NAD  Neck: No JVD  Respiratory: CTAB, no wheezes, rales or rhonchi  Cardiovascular: S1, S2, RRR  Gastrointestinal: BS+, soft, NT/ND  Extremities: No peripheral edema    Hospital Medications:   MEDICATIONS  (STANDING):  aMIOdarone    Tablet 200 milliGRAM(s) Oral daily  ascorbic acid 500 milliGRAM(s) Oral daily  aspirin  chewable 81 milliGRAM(s) Oral daily  atorvastatin 80 milliGRAM(s) Oral at bedtime  chlorhexidine 0.12% Liquid 15 milliLiter(s) Oral Mucosa every 12 hours  chlorhexidine 2% Cloths 1 Application(s) Topical daily  chlorhexidine 4% Liquid 1 Application(s) Topical <User Schedule>  dextrose 50% Injectable 50 milliLiter(s) IV Push every 15 minutes  epoetin ignacia (EPOGEN) Injectable 41860 Unit(s) IV Push <User Schedule>  ferrous    sulfate Liquid 300 milliGRAM(s) Enteral Tube daily  heparin  Infusion 500 Unit(s)/Hr (12.5 mL/Hr) IV Continuous <Continuous>  insulin lispro (ADMELOG) corrective regimen sliding scale   SubCutaneous every 6 hours  melatonin 5 milliGRAM(s) Oral at bedtime  methocarbamol 500 milliGRAM(s) Oral every 8 hours  metoprolol tartrate 25 milliGRAM(s) Oral two times a day  mirtazapine 7.5 milliGRAM(s) Oral at bedtime  Nephro-elicia 1 Tablet(s) Oral daily  pantoprazole  Injectable 40 milliGRAM(s) IV Push daily  sodium chloride 0.65% Nasal 1 Spray(s) Both Nostrils every 12 hours  sodium chloride 0.9%. 1000 milliLiter(s) (10 mL/Hr) IV Continuous <Continuous>  sodium zirconium cyclosilicate 10 Gram(s) Oral <User Schedule>  traZODone 50 milliGRAM(s) Oral at bedtime      LABS:  02-19    136  |  94[L]  |  46[H]  ----------------------------<  113[H]  4.6   |  26  |  6.07[H]    Ca    9.0      19 Feb 2025 00:32  Phos  4.7     02-19  Mg     3.0     02-19    TPro  6.3  /  Alb  2.7[L]  /  TBili  0.4  /  DBili      /  AST  18  /  ALT  9[L]  /  AlkPhos  97  02-19    Creatinine Trend: 6.07 <--, 5.01 <--, 4.05 <--, 2.93 <--, 3.29 <--, 4.44 <--, 4.10 <--, 6.47 <--, 6.04 <--, 5.88 <--, 5.80 <--, 5.62 <--    Albumin: 2.7 g/dL (02-19 @ 00:32)  Phosphorus: 4.7 mg/dL (02-19 @ 00:32)                              8.2    11.13 )-----------( 314      ( 19 Feb 2025 00:29 )             26.5     Urine Studies:  Urinalysis - [02-19-25 @ 00:32]      Color  / Appearance  / SG  / pH       Gluc 113 / Ketone   / Bili  / Urobili        Blood  / Protein  / Leuk Est  / Nitrite       RBC  / WBC  / Hyaline  / Gran  / Sq Epi  / Non Sq Epi  / Bacteria       Iron 29, TIBC 143, %sat 20      [12-19-24 @ 05:00]  Ferritin 904      [12-19-24 @ 05:00]  TSH 4.75      [12-24-24 @ 06:50]        RADIOLOGY & ADDITIONAL STUDIES:

## 2025-02-19 NOTE — PROGRESS NOTE ADULT - SUBJECTIVE AND OBJECTIVE BOX
Patient seen and examined at the bedside.    Remains critically ill on continuous ICU monitoring.      Brief Summary:  53 yo M with multiple medical problems including CAD s/p PCI in 2024 s/p CABG x 3 on 24: Intubated for hypoxia & left lung white out s/p awake bronch with removal of copious mucopurulent secretions  : s/p IABP insertion, sepsis/septic shock due to E coli   ESBL pneumonia, collapsed Left Lung, s/p multiple bronch    tracheostomy tube placement    VRE E. Faecium Bcx   +HSV PCR BAL   tissue cx from back abscess - Serratia   - - intermittent bradycardia/junctional episodes with hypotension  2/3: off , off theophylline. iHD 1L UF  : bronch for Left lung collapse wound vac, 2decho w/ moderate pericardial effusion - similar as before, US abd: small - moderate ascites - monitor at this time.  Wound vac placed for sacral wounds  : iHD-1L UF  : bronch today off positive pressure. : MRSA and serratia from cyst on the back. Plan for Levaquin + Vanc x 5 days   : concern for left food drop   2/10 : no acute issues - CXR shows improving left sided aeration, pt subjectively reports having difficulty while lying flat  : s/p Paracentesis 3.5 L removed, leukocytosis   : Ascites fluid PMN < 250 (less likely SBP)   sniff test yesterday showed paradoxical diaphragmatic motion    : mucus plugging but able to clear airway with aggressive pulmonary toileting.   : no vent requirement overnight, able to mobilize secretion with pulm toileting  hyperkalemia post HD - workup for possible recirculation underway   : On TC X 48 hours - ambulating well, no mucus plugging events    24 Hour events:  On TC ambulating well  Remains in A flutter  overnight HFTC, TC during the day         Objective:  Vital Signs Last 24 Hrs  T(C): 36.6 (2025 04:00), Max: 37.4 (2025 20:00)  T(F): 97.9 (2025 04:00), Max: 99.3 (2025 20:00)  HR: 58 (2025 07:00) (54 - 92)  BP: 130/66 (2025 07:00) (129/66 - 173/82)  BP(mean): 93 (2025 07:00) (86 - 119)  RR: 11 (2025 07:00) (10 - 25)  SpO2: 100% (2025 07:00) (80% - 100%)    Parameters below as of 2025 04:00  Patient On (Oxygen Delivery Method): ventilator    O2 Concentration (%): 40    Mode: CPAP with PS  FiO2: 50  PEEP: 8  PS: 8  MAP: 12  PIP: 21              Physical Exam:   General: Alert and interactive  Neurology: Oriented, following commands  Respiratory: Clear bilaterally, Trach site ok.  CV: A flutter  Abdominal: Soft, Nontender  Extremities: Warm, well-perfused  -------------------------------------------------------------------------------------------------------------------------------    Labs:                        8.2    11.13 )-----------( 314      ( 2025 00:29 )             26.5         136  |  94[L]  |  46[H]  ----------------------------<  113[H]  4.6   |  26  |  6.07[H]    Ca    9.0      2025 00:32  Phos  4.7       Mg     3.0         TPro  6.3  /  Alb  2.7[L]  /  TBili  0.4  /  DBili  x   /  AST  18  /  ALT  9[L]  /  AlkPhos  97  19    LIVER FUNCTIONS - ( 2025 00:32 )  Alb: 2.7 g/dL / Pro: 6.3 g/dL / ALK PHOS: 97 U/L / ALT: 9 U/L / AST: 18 U/L / GGT: x           PT/INR - ( 2025 00:29 )   PT: 13.8 sec;   INR: 1.21 ratio         PTT - ( 2025 02:26 )  PTT:46.7 sec    ------------------------------------------------------------------------------------------------------------------------------  Assessment:  53 yo M s/p CABG x 3 on 24    Postop acute respiratory failure  Acute blood loss anemia  ESRD on iHD   hx of ESBL   hx of VRE  Serratia infection  MSRA infection   sacral decub       Plan:  ***Neuro***  Postoperative acute pain control with Robaxin and Tylenol  Trazodone, Remeron, and Melatonin nightly  PT ongoing    ***Cardiovascular***  s/p CABG x 3  A flutter - Tolerating beta blocker, PO Amio for Afib  ASA / Statin daily  TTE  ECHO  LV EF nl, RV remains dilated and hypokinetic (chronic)    ***Pulmonary***  Postoperative acute respiratory insufficiency - HFTC   Tracheostomy    Mucomyst, normal saline nebs, Xopenex to help mobilize secretions  Left lung PNA, + e coli ESBL - cleared but now with VRE   Trach collar trials ongoing - HFTC at night, TC during the day   Being evaluated for possible left diaphragm plication  - since he is clinically improving will continue current conservative care for now  - thoracic team following  Needs aggressive pulm toileting   Plan for bronchoscopy today for recurrent AUGUSTO opacification     ***GI***  Tolerating Tube feeds at goal  Protonix for stress ulcer prophylaxis.   Bowel regimen    ***Renal***  ESRD - tolerating iHD   Nephro-Lisette for renal support    ***ID***  Monitor cultures   BAL - commensal manjit    VRE BAL - Linezolid    Acyclovir for + HSV in BAL   Serratia on tissue cx and h/o ESBL PNA - Meropenem remains on   : MRSA and serratia from cyst on the back. Levaquin + Vanco, completed course  Meropenem  -  (leukocytosis - empiric)     ***Endocrine***  Hyperglycemia - ISS    ***Hematology***  Acute blood loss anemia - no transfusion indication currently.  CBC, coags  Continue Epogen.  Heparin infusion (AF)     Wound:  s/p sacral debridement on  . wound vac placed on  and changed three times a week    *** Lines ***  RUE Midline       Care plan discussed with the ICU care team.   Patient remains critical, at risk for life threatening decompensation.    I have spent 30 minutes providing critical care management to this patient.    By signing my name below, I, Sonali Valle, attest that this documentation has been prepared under the direction and in the presence of Blaze Finch MD.  Electronically signed: Sonali Valle, 25 @ 07:21    I, Blaze Finch MD,  personally performed the services described in this documentation. all medical record entries made by the scribe were at my direction and in my presence. I have reviewed the chart and agree that the record reflects my personal performance and is accurate and complete  Electronically signed: Blaze Finch MD. Patient seen and examined at the bedside.    Remains critically ill on continuous ICU monitoring.      Brief Summary:  55 yo M with multiple medical problems including CAD s/p PCI in 2024 s/p CABG x 3 on 24: Intubated for hypoxia & left lung white out s/p awake bronch with removal of copious mucopurulent secretions  : s/p IABP insertion, sepsis/septic shock due to E coli   ESBL pneumonia, collapsed Left Lung, s/p multiple bronch    tracheostomy tube placement    VRE E. Faecium Bcx   +HSV PCR BAL   tissue cx from back abscess - Serratia   - - intermittent bradycardia/junctional episodes with hypotension  2/3: off , off theophylline. iHD 1L UF  : bronch for Left lung collapse wound vac, 2decho w/ moderate pericardial effusion - similar as before, US abd: small - moderate ascites - monitor at this time.  Wound vac placed for sacral wounds  : iHD-1L UF  : bronch today off positive pressure. : MRSA and serratia from cyst on the back. Plan for Levaquin + Vanc x 5 days   : concern for left food drop   2/10 : no acute issues - CXR shows improving left sided aeration, pt subjectively reports having difficulty while lying flat  : s/p Paracentesis 3.5 L removed, leukocytosis   : Ascites fluid PMN < 250 (less likely SBP)   sniff test yesterday showed paradoxical diaphragmatic motion    : mucus plugging but able to clear airway with aggressive pulmonary toileting.   : no vent requirement overnight, able to mobilize secretion with pulm toileting  hyperkalemia post HD - workup for possible recirculation underway   : On TC X 48 hours - ambulating well, no mucus plugging events    24 Hour events:  On TC ambulating well  Remains in A flutter  overnight HFTC, TC during the day         Objective:  Vital Signs Last 24 Hrs  T(C): 36.6 (2025 04:00), Max: 37.4 (2025 20:00)  T(F): 97.9 (2025 04:00), Max: 99.3 (2025 20:00)  HR: 58 (2025 07:00) (54 - 92)  BP: 130/66 (2025 07:00) (129/66 - 173/82)  BP(mean): 93 (2025 07:00) (86 - 119)  RR: 11 (2025 07:00) (10 - 25)  SpO2: 100% (2025 07:00) (80% - 100%)    Parameters below as of 2025 04:00  Patient On (Oxygen Delivery Method): ventilator    O2 Concentration (%): 40    Mode: CPAP with PS  FiO2: 50  PEEP: 8  PS: 8  MAP: 12  PIP: 21              Physical Exam:   General: Alert and interactive  Neurology: Oriented, following commands  Respiratory: Clear bilaterally, Trach site ok.  CV: A flutter  Abdominal: Soft, Nontender  Extremities: Warm, well-perfused  -------------------------------------------------------------------------------------------------------------------------------    Labs:                        8.2    11.13 )-----------( 314      ( 2025 00:29 )             26.5         136  |  94[L]  |  46[H]  ----------------------------<  113[H]  4.6   |  26  |  6.07[H]    Ca    9.0      2025 00:32  Phos  4.7       Mg     3.0         TPro  6.3  /  Alb  2.7[L]  /  TBili  0.4  /  DBili  x   /  AST  18  /  ALT  9[L]  /  AlkPhos  97  19    LIVER FUNCTIONS - ( 2025 00:32 )  Alb: 2.7 g/dL / Pro: 6.3 g/dL / ALK PHOS: 97 U/L / ALT: 9 U/L / AST: 18 U/L / GGT: x           PT/INR - ( 2025 00:29 )   PT: 13.8 sec;   INR: 1.21 ratio         PTT - ( 2025 02:26 )  PTT:46.7 sec    ------------------------------------------------------------------------------------------------------------------------------  Assessment:  55 yo M s/p CABG x 3 on 24    Postop acute respiratory failure  Acute blood loss anemia  ESRD on iHD   hx of ESBL   hx of VRE  Serratia infection  MSRA infection   sacral decub       Plan:  ***Neuro***  Postoperative acute pain control with Robaxin and Tylenol  Trazodone, Remeron, and Melatonin nightly  PT ongoing    ***Cardiovascular***  s/p CABG x 3  A flutter - Tolerating beta blocker, PO Amio for Afib  ASA / Statin daily  TTE  ECHO : 1. Left ventricular systolic function is normal.   2. Enlarged right ventricular cavity size and mildly reduced right ventricular systolic function.   3. Left pleural effusion noted.   4. Trace pericardial effusion.    ***Pulmonary***  Postoperative acute respiratory insufficiency - HFTC   Tracheostomy    Mucomyst, normal saline nebs, Xopenex to help mobilize secretions  Left lung PNA, + e coli ESBL - cleared but now with VRE   Trach collar trials ongoing - HFTC at night, TC during the day   Being evaluated for possible left diaphragm plication  - since he is clinically improving will continue current conservative care for now  - thoracic team following  Needs aggressive pulm toileting   Plan for bronchoscopy today for recurrent AUGUSTO opacification     ***GI***  Tolerating Tube feeds at goal  Protonix for stress ulcer prophylaxis.   Bowel regimen    ***Renal***  ESRD - tolerating iHD   Nephro-Lisette for renal support    ***ID***  Monitor cultures   BAL - commensal manjit    VRE BAL - Linezolid    Acyclovir for + HSV in BAL   Serratia on tissue cx and h/o ESBL PNA   : MRSA and serratia from cyst on the back. Levaquin + Vanco, completed course  Meropenem  -  (leukocytosis - empiric)     ***Endocrine***  Hyperglycemia - ISS    ***Hematology***  Acute blood loss anemia - no transfusion indication currently.  CBC, coags  Continue Epogen.  Heparin infusion (AF)     Wound:  s/p sacral debridement on  . wound vac placed on  and changed three times a week    *** Lines ***  RUE Midline       Care plan discussed with the ICU care team.   Patient remains critical, at risk for life threatening decompensation.    I have spent 35 minutes providing critical care management to this patient.    By signing my name below, I, Sonali Valle, attest that this documentation has been prepared under the direction and in the presence of Blaze Finch MD.  Electronically signed: Sonali Valle, 25 @ 07:21    I, Blaze Finch MD,  personally performed the services described in this documentation. all medical record entries made by the scribe were at my direction and in my presence. I have reviewed the chart and agree that the record reflects my personal performance and is accurate and complete  Electronically signed: Blaze Finch MD.

## 2025-02-20 LAB
ALBUMIN SERPL ELPH-MCNC: 2.7 G/DL — LOW (ref 3.3–5)
ALP SERPL-CCNC: 90 U/L — SIGNIFICANT CHANGE UP (ref 40–120)
ALT FLD-CCNC: 7 U/L — LOW (ref 10–45)
ANION GAP SERPL CALC-SCNC: 10 MMOL/L — SIGNIFICANT CHANGE UP (ref 5–17)
APTT BLD: 50.4 SEC — HIGH (ref 24.5–35.6)
AST SERPL-CCNC: 15 U/L — SIGNIFICANT CHANGE UP (ref 10–40)
BILIRUB SERPL-MCNC: 0.4 MG/DL — SIGNIFICANT CHANGE UP (ref 0.2–1.2)
BUN SERPL-MCNC: 30 MG/DL — HIGH (ref 7–23)
CALCIUM SERPL-MCNC: 8.5 MG/DL — SIGNIFICANT CHANGE UP (ref 8.4–10.5)
CHLORIDE SERPL-SCNC: 98 MMOL/L — SIGNIFICANT CHANGE UP (ref 96–108)
CO2 SERPL-SCNC: 29 MMOL/L — SIGNIFICANT CHANGE UP (ref 22–31)
CREAT SERPL-MCNC: 4.63 MG/DL — HIGH (ref 0.5–1.3)
CULTURE RESULTS: ABNORMAL
EGFR: 14 ML/MIN/1.73M2 — LOW
EGFR: 14 ML/MIN/1.73M2 — LOW
GLUCOSE SERPL-MCNC: 162 MG/DL — HIGH (ref 70–99)
HCT VFR BLD CALC: 25.4 % — LOW (ref 39–50)
HGB BLD-MCNC: 8 G/DL — LOW (ref 13–17)
INR BLD: 1.24 RATIO — HIGH (ref 0.85–1.16)
MAGNESIUM SERPL-MCNC: 2.6 MG/DL — SIGNIFICANT CHANGE UP (ref 1.6–2.6)
MCHC RBC-ENTMCNC: 30.5 PG — SIGNIFICANT CHANGE UP (ref 27–34)
MCHC RBC-ENTMCNC: 31.5 G/DL — LOW (ref 32–36)
MCV RBC AUTO: 96.9 FL — SIGNIFICANT CHANGE UP (ref 80–100)
NRBC BLD AUTO-RTO: 0 /100 WBCS — SIGNIFICANT CHANGE UP (ref 0–0)
PHOSPHATE SERPL-MCNC: 3.5 MG/DL — SIGNIFICANT CHANGE UP (ref 2.5–4.5)
PLATELET # BLD AUTO: 309 K/UL — SIGNIFICANT CHANGE UP (ref 150–400)
POTASSIUM SERPL-MCNC: 4.1 MMOL/L — SIGNIFICANT CHANGE UP (ref 3.5–5.3)
POTASSIUM SERPL-SCNC: 4.1 MMOL/L — SIGNIFICANT CHANGE UP (ref 3.5–5.3)
PROT SERPL-MCNC: 6.1 G/DL — SIGNIFICANT CHANGE UP (ref 6–8.3)
PROTHROM AB SERPL-ACNC: 14.2 SEC — HIGH (ref 9.9–13.4)
RBC # BLD: 2.62 M/UL — LOW (ref 4.2–5.8)
SODIUM SERPL-SCNC: 137 MMOL/L — SIGNIFICANT CHANGE UP (ref 135–145)
SPECIMEN SOURCE: SIGNIFICANT CHANGE UP
WBC # BLD: 9.86 K/UL — SIGNIFICANT CHANGE UP (ref 3.8–10.5)
WBC # FLD AUTO: 9.86 K/UL — SIGNIFICANT CHANGE UP (ref 3.8–10.5)

## 2025-02-20 PROCEDURE — 99232 SBSQ HOSP IP/OBS MODERATE 35: CPT

## 2025-02-20 PROCEDURE — G0545: CPT

## 2025-02-20 PROCEDURE — 71045 X-RAY EXAM CHEST 1 VIEW: CPT | Mod: 26

## 2025-02-20 RX ORDER — OXYCODONE HYDROCHLORIDE 30 MG/1
10 TABLET ORAL ONCE
Refills: 0 | Status: DISCONTINUED | OUTPATIENT
Start: 2025-02-20 | End: 2025-02-20

## 2025-02-20 RX ADMIN — Medication 81 MILLIGRAM(S): at 11:55

## 2025-02-20 RX ADMIN — HEPARIN SODIUM 12.5 UNIT(S)/HR: 1000 INJECTION INTRAVENOUS; SUBCUTANEOUS at 11:54

## 2025-02-20 RX ADMIN — METHOCARBAMOL 500 MILLIGRAM(S): 500 TABLET, FILM COATED ORAL at 17:51

## 2025-02-20 RX ADMIN — METOPROLOL SUCCINATE 25 MILLIGRAM(S): 50 TABLET, EXTENDED RELEASE ORAL at 06:48

## 2025-02-20 RX ADMIN — Medication 1 APPLICATION(S): at 06:48

## 2025-02-20 RX ADMIN — Medication 15 MILLILITER(S): at 17:51

## 2025-02-20 RX ADMIN — METHOCARBAMOL 500 MILLIGRAM(S): 500 TABLET, FILM COATED ORAL at 09:43

## 2025-02-20 RX ADMIN — Medication 1 APPLICATION(S): at 22:43

## 2025-02-20 RX ADMIN — Medication 1 TABLET(S): at 11:55

## 2025-02-20 RX ADMIN — Medication 15 MILLILITER(S): at 06:48

## 2025-02-20 RX ADMIN — AMIODARONE HYDROCHLORIDE 200 MILLIGRAM(S): 50 INJECTION, SOLUTION INTRAVENOUS at 06:48

## 2025-02-20 RX ADMIN — OXYCODONE HYDROCHLORIDE 10 MILLIGRAM(S): 30 TABLET ORAL at 15:52

## 2025-02-20 RX ADMIN — ATORVASTATIN CALCIUM 80 MILLIGRAM(S): 80 TABLET, FILM COATED ORAL at 21:36

## 2025-02-20 RX ADMIN — MIRTAZAPINE 7.5 MILLIGRAM(S): 30 TABLET, FILM COATED ORAL at 21:35

## 2025-02-20 RX ADMIN — Medication 5 MILLIGRAM(S): at 21:35

## 2025-02-20 RX ADMIN — Medication 300 MILLIGRAM(S): at 11:55

## 2025-02-20 RX ADMIN — Medication 500 MILLIGRAM(S): at 11:55

## 2025-02-20 RX ADMIN — SODIUM ZIRCONIUM CYCLOSILICATE 10 GRAM(S): 5 POWDER, FOR SUSPENSION ORAL at 09:43

## 2025-02-20 RX ADMIN — INSULIN LISPRO 2: 100 INJECTION, SOLUTION INTRAVENOUS; SUBCUTANEOUS at 12:55

## 2025-02-20 RX ADMIN — Medication 50 MILLIGRAM(S): at 21:35

## 2025-02-20 RX ADMIN — OXYCODONE HYDROCHLORIDE 10 MILLIGRAM(S): 30 TABLET ORAL at 15:22

## 2025-02-20 RX ADMIN — Medication 40 MILLIGRAM(S): at 11:55

## 2025-02-20 RX ADMIN — METOPROLOL SUCCINATE 25 MILLIGRAM(S): 50 TABLET, EXTENDED RELEASE ORAL at 17:51

## 2025-02-20 NOTE — PROGRESS NOTE ADULT - SUBJECTIVE AND OBJECTIVE BOX
Patient seen and examined at the bedside.    Remains critically ill on continuous ICU monitoring.      Brief Summary:  53 yo M with multiple medical problems including CAD s/p PCI in 2024 s/p CABG x 3 on 24: Intubated for hypoxia & left lung white out s/p awake bronch with removal of copious mucopurulent secretions  : s/p IABP insertion, sepsis/septic shock due to E coli   ESBL pneumonia, collapsed Left Lung, s/p multiple bronch    tracheostomy tube placement    VRE E. Faecium Bcx   +HSV PCR BAL   tissue cx from back abscess - Serratia   - - intermittent bradycardia/junctional episodes with hypotension  2/3: off , off theophylline. iHD 1L UF  : bronch for Left lung collapse wound vac, 2decho w/ moderate pericardial effusion - similar as before, US abd: small - moderate ascites - monitor at this time.  Wound vac placed for sacral wounds  : iHD-1L UF  : bronch today off positive pressure. : MRSA and serratia from cyst on the back. Plan for Levaquin + Vanc x 5 days   : concern for left food drop   2/10 : no acute issues - CXR shows improving left sided aeration, pt subjectively reports having difficulty while lying flat  : s/p Paracentesis 3.5 L removed, leukocytosis   : Ascites fluid PMN < 250 (less likely SBP)   sniff test yesterday showed paradoxical diaphragmatic motion    : mucus plugging but able to clear airway with aggressive pulmonary toileting.   : no vent requirement overnight, able to mobilize secretion with pulm toileting  hyperkalemia post HD - workup for possible recirculation underway   : On TC X 48 hours - ambulating well, no mucus plugging events    24 Hour events:  On TC ambulating well  Remains in A flutter  Overnight HFTC, TC during the day   HD yesterday for -1.5L  Bronch yesterday    Objective:  Vital Signs Last 24 Hrs  T(C): 36.4 (2025 00:00), Max: 36.4 (2025 16:00)  T(F): 97.5 (2025 00:00), Max: 97.6 (2025 16:00)  HR: 61 (2025 05:40) (55 - 100)  BP: 148/72 (2025 03:00) (117/61 - 164/73)  BP(mean): 103 (2025 03:00) (84 - 105)  RR: 15 (2025 03:00) (11 - 44)  SpO2: 93% (2025 05:40) (79% - 100%)    Parameters below as of 2025 00:00  Patient On (Oxygen Delivery Method): ventilator    O2 Concentration (%): 50    Mode: standby  FiO2: 40    Physical Exam:  General: Alert and interactive  Neurology: Oriented, following commands  Respiratory: Clear bilaterally, Trach site ok.  CV: A flutter  Abdominal: Soft, Nontender  Extremities: Warm, well-perfused  -------------------------------------------------------------------------------------------------------------------------------    Labs:                        8.0    9.86  )-----------( 309      ( 2025 00:21 )             25.4     02-20    137  |  98  |  30[H]  ----------------------------<  162[H]  4.1   |  29  |  4.63[H]    Ca    8.5      2025 00:21  Phos  3.5     02-20  Mg     2.6     02-20    TPro  6.1  /  Alb  2.7[L]  /  TBili  0.4  /  DBili  x   /  AST  15  /  ALT  7[L]  /  AlkPhos  90  02-20    LIVER FUNCTIONS - ( 2025 00:21 )  Alb: 2.7 g/dL / Pro: 6.1 g/dL / ALK PHOS: 90 U/L / ALT: 7 U/L / AST: 15 U/L / GGT: x           PT/INR - ( 2025 00:21 )   PT: 14.2 sec;   INR: 1.24 ratio       PTT - ( 2025 00:21 )  PTT:50.4 sec  ------------------------------------------------------------------------------------------------------------------------------  Assessment:  53 yo M s/p CABG x 3 on 24    Postop acute respiratory failure  Acute blood loss anemia  ESRD on iHD   hx of ESBL   hx of VRE  Serratia infection  MSRA infection   sacral decub       Plan:  ***Neuro***  Postoperative acute pain control with Robaxin and Tylenol  Trazodone, Remeron, and Melatonin nightly  PT ongoing    ***Cardiovascular***  s/p CABG x 3  A flutter - Tolerating beta blocker, PO Amio for Afib  ASA / Statin daily  TTE  ECHO : 1. Left ventricular systolic function is normal.   2. Enlarged right ventricular cavity size and mildly reduced right ventricular systolic function.   3. Left pleural effusion noted.   4. Trace pericardial effusion.    ***Pulmonary***  Postoperative acute respiratory insufficiency - HFTC   Tracheostomy    Mucomyst, normal saline nebs, Xopenex to help mobilize secretions  Left lung PNA, + e coli ESBL - cleared but now with VRE   Trach collar trials ongoing - HFTC at night, TC during the day   Being evaluated for possible left diaphragm plication  - since he is clinically improving will continue current conservative care for now  - thoracic team following  Needs aggressive pulm toileting   Bronchoscopy yesterday for recurrent AUGUSTO opacification     ***GI***  Tolerating Tube feeds at goal  Protonix for stress ulcer prophylaxis.     ***Renal***  ESRD - tolerating iHD  Nephro-Lisette for renal support    ***ID***  Monitor cultures   BAL - commensal manjit    VRE BAL - Linezolid    Acyclovir for + HSV in BAL   Serratia on tissue cx and h/o ESBL PNA   : MRSA and serratia from cyst on the back. Levaquin + Vanco, completed course  Meropenem  -  (leukocytosis - empiric)     ***Endocrine***  Hyperglycemia - ISS    ***Hematology***  Acute blood loss anemia - no transfusion indication currently.  CBC, coags  Continue Epogen.  Heparin infusion (AF)     Wound:  s/p sacral debridement on  . wound vac placed on  and changed three times a week    *** Lines ***  RUE Midline         Care plan discussed with the ICU care team.   Patient remains critical, at risk for life threatening decompensation.    I have spent 30 minutes providing critical care management to this patient.    By signing my name below, I, Baldemar Hernandez, attest that this documentation has been prepared under the direction and in the presence of Blaze Finch MD.  Electronically signed: Evelio Sevilla 25 @ 06:37    I, Blaze Finch MD,  personally performed the services described in this documentation. All medical record entries made by the scribe were at my direction and in my presence. I have reviewed the chart and agree that the record reflects my personal performance and is accurate and complete  Electronically signed: Blaze Finch MD. Patient seen and examined at the bedside.    Remains critically ill on continuous ICU monitoring.      Brief Summary:  53 yo M with multiple medical problems including CAD s/p PCI in 2024 s/p CABG x 3 on 24: Intubated for hypoxia & left lung white out s/p awake bronch with removal of copious mucopurulent secretions  : s/p IABP insertion, sepsis/septic shock due to E coli   ESBL pneumonia, collapsed Left Lung, s/p multiple bronch    tracheostomy tube placement    VRE E. Faecium Bcx   +HSV PCR BAL   tissue cx from back abscess - Serratia   - - intermittent bradycardia/junctional episodes with hypotension  2/3: off , off theophylline. iHD 1L UF  : bronch for Left lung collapse wound vac, 2decho w/ moderate pericardial effusion - similar as before, US abd: small - moderate ascites - monitor at this time.  Wound vac placed for sacral wounds  : iHD-1L UF  : bronch today off positive pressure. : MRSA and serratia from cyst on the back. Plan for Levaquin + Vanc x 5 days   : concern for left food drop   2/10 : no acute issues - CXR shows improving left sided aeration, pt subjectively reports having difficulty while lying flat  : s/p Paracentesis 3.5 L removed, leukocytosis   : Ascites fluid PMN < 250 (less likely SBP)   sniff test yesterday showed paradoxical diaphragmatic motion    : mucus plugging but able to clear airway with aggressive pulmonary toileting.   : no vent requirement overnight, able to mobilize secretion with pulm toileting  hyperkalemia post HD - workup for possible recirculation underway   : On TC X 48 hours - ambulating well, no mucus plugging events    24 Hour events:  On TC ambulating well  Remains in A flutter  Overnight HFTC, TC during the day   HD yesterday for -1.5L  Bronch     Objective:  Vital Signs Last 24 Hrs  T(C): 36.4 (2025 00:00), Max: 36.4 (2025 16:00)  T(F): 97.5 (2025 00:00), Max: 97.6 (2025 16:00)  HR: 61 (2025 05:40) (55 - 100)  BP: 148/72 (2025 03:00) (117/61 - 164/73)  BP(mean): 103 (2025 03:00) (84 - 105)  RR: 15 (2025 03:00) (11 - 44)  SpO2: 93% (2025 05:40) (79% - 100%)    Parameters below as of 2025 00:00  Patient On (Oxygen Delivery Method): ventilator    O2 Concentration (%): 50    Mode: standby  FiO2: 40    Physical Exam:  General: Alert and interactive  Neurology: Oriented, following commands  Respiratory: Clear bilaterally, Trach site ok.  CV: A flutter  Abdominal: Soft, Nontender  Extremities: Warm, well-perfused  -------------------------------------------------------------------------------------------------------------------------------    Labs:                        8.0    9.86  )-----------( 309      ( 2025 00:21 )             25.4     02-20    137  |  98  |  30[H]  ----------------------------<  162[H]  4.1   |  29  |  4.63[H]    Ca    8.5      2025 00:21  Phos  3.5     02-20  Mg     2.6     02-20    TPro  6.1  /  Alb  2.7[L]  /  TBili  0.4  /  DBili  x   /  AST  15  /  ALT  7[L]  /  AlkPhos  90  02-20    LIVER FUNCTIONS - ( 2025 00:21 )  Alb: 2.7 g/dL / Pro: 6.1 g/dL / ALK PHOS: 90 U/L / ALT: 7 U/L / AST: 15 U/L / GGT: x           PT/INR - ( 2025 00:21 )   PT: 14.2 sec;   INR: 1.24 ratio       PTT - ( 2025 00:21 )  PTT:50.4 sec  ------------------------------------------------------------------------------------------------------------------------------  Assessment:  53 yo M s/p CABG x 3 on 24    Postop acute respiratory failure  Acute blood loss anemia  ESRD on iHD   hx of ESBL   hx of VRE  Serratia infection  MSRA infection   sacral decub       Plan:  ***Neuro***  Postoperative acute pain control with Robaxin and Tylenol  Trazodone, Remeron, and Melatonin nightly  PT ongoing    ***Cardiovascular***  s/p CABG x 3  A flutter - Tolerating beta blocker, PO Amio for Afib  ASA / Statin daily  TTE  ECHO : 1. Left ventricular systolic function is normal.   2. Enlarged right ventricular cavity size and mildly reduced right ventricular systolic function.   3. Left pleural effusion noted.   4. Trace pericardial effusion.    ***Pulmonary***  Postoperative acute respiratory insufficiency - HFTC   Tracheostomy    Mucomyst, normal saline nebs, Xopenex to help mobilize secretions  Left lung PNA, + e coli ESBL - cleared but now with VRE   Trach collar trials ongoing - HFTC at night, TC during the day   Being evaluated for possible left diaphragm plication  - since he is clinically improving will continue current conservative care for now  - thoracic team following  Needs aggressive pulm toileting   Bronchoscopy  for recurrent AUGUSTO opacification     ***GI***  Tolerating Tube feeds at goal  Protonix for stress ulcer prophylaxis.     ***Renal***  ESRD - tolerating iHD  Nephro-Lisette for renal support    ***ID***  no active infection - off antibiotics   Monitor cultures   BAL - commensal manjit    VRE BAL - Linezolid    Acyclovir for + HSV in BAL   Serratia on tissue cx and h/o ESBL PNA   : MRSA and serratia from cyst on the back. Levaquin + Vanco, completed course  Meropenem  -  (leukocytosis - empiric)    BAL commensal manjit     ***Endocrine***  Hyperglycemia - ISS    ***Hematology***  Acute blood loss anemia - no transfusion indication currently.  CBC, coags  Continue Epogen.  Heparin infusion (AF)     Wound:  s/p sacral debridement on  . wound vac placed on  and changed three times a week    *** Lines ***  RUE Midline         Care plan discussed with the ICU care team.   Patient remains critical, at risk for life threatening decompensation.    I have spent 32 minutes providing critical care management to this patient.    By signing my name below, I, Baldemar Hernandez, attest that this documentation has been prepared under the direction and in the presence of Blaze Finch MD.  Electronically signed: Evelio Sevilla 25 @ 06:37    I, Blaze Finch MD,  personally performed the services described in this documentation. All medical record entries made by the scribe were at my direction and in my presence. I have reviewed the chart and agree that the record reflects my personal performance and is accurate and complete  Electronically signed: Blaze Finch MD.

## 2025-02-20 NOTE — PROGRESS NOTE ADULT - SUBJECTIVE AND OBJECTIVE BOX
[facility]   MD DREW VIZCARRA MD ANGELA WONG, PA QIAN CHEN, NP    TEL:  OFFICE: [phone]  From 5pm-7am Answering Service [phone]    -- RENAL FOLLOW UP NOTE ---Date of Service 02-20-25 @ 16:07    Patient is a 54y old  Male who presents with a chief complaint of CAD s/p CABG.    Patient seen and examined at bedside, in ICU. No chest pain/SOB.    VITALS:  T(F): 98.1 (02-20-25 @ 12:00), Max: 98.1 (02-20-25 @ 12:00)  HR: 76 (02-20-25 @ 15:33)  BP: 126/63 (02-20-25 @ 13:00)  RR: 16 (02-20-25 @ 13:00)  SpO2: 100% (02-20-25 @ 15:33)  Wt(kg): --    02-19 @ 07:01  -  02-20 @ 07:00  --------------------------------------------------------  IN: 2027 mL / OUT: 1500 mL / NET: 527 mL    02-20 @ 07:01  -  02-20 @ 16:07  --------------------------------------------------------  IN: 895 mL / OUT: 0 mL / NET: 895 mL       PHYSICAL EXAM:  General: NAD  Neck: No JVD  Respiratory: CTAB, no wheezes, rales or rhonchi  Cardiovascular: S1, S2, RRR  Gastrointestinal: BS+, soft, NT/ND  Extremities: No peripheral edema    Hospital Medications:   MEDICATIONS  (STANDING):  aMIOdarone    Tablet 200 milliGRAM(s) Oral daily  ascorbic acid 500 milliGRAM(s) Oral daily  aspirin  chewable 81 milliGRAM(s) Oral daily  atorvastatin 80 milliGRAM(s) Oral at bedtime  chlorhexidine 0.12% Liquid 15 milliLiter(s) Oral Mucosa every 12 hours  chlorhexidine 2% Cloths 1 Application(s) Topical daily  chlorhexidine 4% Liquid 1 Application(s) Topical <User Schedule>  dextrose 50% Injectable 50 milliLiter(s) IV Push every 15 minutes  epoetin ignacia (EPOGEN) Injectable 90704 Unit(s) IV Push <User Schedule>  ferrous    sulfate Liquid 300 milliGRAM(s) Enteral Tube daily  heparin  Infusion 500 Unit(s)/Hr (12.5 mL/Hr) IV Continuous <Continuous>  insulin lispro (ADMELOG) corrective regimen sliding scale   SubCutaneous every 6 hours  melatonin 5 milliGRAM(s) Oral at bedtime  methocarbamol 500 milliGRAM(s) Oral every 8 hours  metoprolol tartrate 25 milliGRAM(s) Oral two times a day  mirtazapine 7.5 milliGRAM(s) Oral at bedtime  Nephro-elicia 1 Tablet(s) Oral daily  pantoprazole  Injectable 40 milliGRAM(s) IV Push daily  sodium chloride 0.65% Nasal 1 Spray(s) Both Nostrils every 12 hours  sodium chloride 0.9%. 1000 milliLiter(s) (10 mL/Hr) IV Continuous <Continuous>  sodium zirconium cyclosilicate 10 Gram(s) Oral <User Schedule>  traZODone 50 milliGRAM(s) Oral at bedtime      LABS:  02-20    137  |  98  |  30[H]  ----------------------------<  162[H]  4.1   |  29  |  4.63[H]    Ca    8.5      20 Feb 2025 00:21  Phos  3.5     02-20  Mg     2.6     02-20    TPro  6.1  /  Alb  2.7[L]  /  TBili  0.4  /  DBili      /  AST  15  /  ALT  7[L]  /  AlkPhos  90  02-20    Creatinine Trend: 4.63 <--, 6.07 <--, 5.01 <--, 4.05 <--, 2.93 <--, 3.29 <--, 4.44 <--, 4.10 <--    Albumin: 2.7 g/dL (02-20 @ 00:21)  Phosphorus: 3.5 mg/dL (02-20 @ 00:21)                          8.0    9.86  )-----------( 309      ( 20 Feb 2025 00:21 )             25.4     Urine Studies:  Urinalysis - [02-20-25 @ 00:21]      Color  / Appearance  / SG  / pH       Gluc 162 / Ketone   / Bili  / Urobili        Blood  / Protein  / Leuk Est  / Nitrite       RBC  / WBC  / Hyaline  / Gran  / Sq Epi  / Non Sq Epi  / Bacteria       Iron 29, TIBC 143, %sat 20      [12-19-24 @ 05:00]  Ferritin 904      [12-19-24 @ 05:00]  TSH 4.75      [12-24-24 @ 06:50]

## 2025-02-20 NOTE — CHART NOTE - NSCHARTNOTEFT_GEN_A_CORE
Patient felt like the AFO was too big and was slipping out of the post op shoe while ambulating. I exchanged the AFO for a smaller size and donned the orthosis on the patient. Patient was satisfied with the fit of the orthosis.   [facility]  [phone]

## 2025-02-20 NOTE — PROGRESS NOTE ADULT - ASSESSMENT
54M with PMH of HTN, HLD, ESRD on HD MWF via LUE AVF, ascites (requiring repeat paracenteses q4-6wks), NICM with moderate LV dysfunction w/ EF 35-40%(2023) and CAD s/p atherectomy + SALLIE to D1(4/11/2024) presents to [facility] transferred from [facility] for CABG eval  Nephro on baord for HD    ESRD on HD   Nephrologist Dr. Bailey  [facility] Schedule / Access:  E AVF  Center: [facility]  Pt refused HD 2/18 as wants to return to [facility] schedule  Pt s/p HD on 2/19  Plan for HD tomorrow.  Keep MAP>65  Renal Diet  HD consent in chart     Hyper/Hypokalemia  Pt intermittently Hyperkalemia  HD as scheduled  now improved  Low K diet.  C/W Lokelma 10gm on nonHD days.  Monitor K.    HTN  BP fluctuating   UF w/ HD as BP permits.  Low sodium diet.  Off antihypertensives.  Monitor BP     CKD MBD  Check PTH   Monitor Ca , PO4 daily     Anemia  CRISTIANE with HD  Repeat iron studies.  Transfuse if hgb <7    CAD with multivessel disease  CTICU following  s/p CABG 12/30    Hypoxic respiratory  failure requiring Trach  Per surgery  S/p bronchoscopy, pulm following

## 2025-02-20 NOTE — PROGRESS NOTE ADULT - ASSESSMENT
54M with HTN, HLD, ESRD on HD MWF via LUE AVF, ascites (requiring repeat paracenteses q4-6wks), NICM with moderate LV dysfunction w/ EF 35-40%() and CAD s/p atherectomy + SALLIE to D1 (2024) presents to [facility] 2024 as transfer from [facility] (via [facility]) where he presented  with SOB.   In [facility] ED, pt was hypoxic to 86% on RA, trop 1.7K, EKG no new changes, CXR fluid overload. Admitted for AHRF 2/2 fluid overload. Pt received HD on , ,  and . DAPT was resumed, TTE showed improvement in LVEF to 40-45%. Stress test was abnormal with 1mm inferior STD, reversible ischemia in the inferior wall and fixed defects in the lateral, anterior and apical walls with post stress EF of 24%. Pt was then transferred to [facility] for cardiac cath which showed pLAD 70% ISR, mLCx 70%, D1 severe dz. Patient now transferred to [facility] 2024 for CABG.       - underwent  C3L free LIMA-LAD; SVG-Diag; SVG-leftPL   - extubated   - hypotension and ECHO showing Systolic HF/depressed BIV Function, currently supported with /pressors.  Labs this evening showing transaminitis   - hypotensive, febrile and reintubated  1/3 - cultures (+) E coli +ESBL bacteremia   - underwent tracheostomy   - left lung collapse s/p bronchoscopy   - seen by plastic surgery / colorectal for sacral wound, no surgical intervention, no evidence of fistula, BC sent  - c/o right arm  pain, started on acyclovir for positive HSV PCR     doing a little better tissue culture with serratia; bronch also growing some yeast and VREC   back with nodule with some discharge    sacral decubitus less tender, some bloody secretions from trach  - still with drainage from back lesion  s/p I & D of lesion and sacrum   noted left foot numbness , foot drop   2/10 - completed abs for back- dried up  local care for decubitus. thoracic surgery to assess left diaphragm    jump in WBC   WBC slightly less   - remains stable off abs    Recommendations  - completed linezolid course  -completed meropenem  completed abs for back   - surgical f/u appreciated   - check cultures from bronch  -neuro input appreciated, leg less numb  - local care for sacral decubitus, more comfortable   - last paracentesis not c/w infection   for further suctioning and evaluation of lung  completed another course of  meropenem -   changed lines    stable off abs  left lung continues to be area of concern, no recent mucus plugging       Marla Champion M.D. ,   please reach via teams   If no answer, or after 5PM/ weekends,  then please call  [phone]    Assessment and plan discussed with the primary team .    ID will follow the patient PRN. Please recontact ID if we can be of further assistance . [phone]

## 2025-02-20 NOTE — PROGRESS NOTE ADULT - SUBJECTIVE AND OBJECTIVE BOX
Patient is a 54y old  Male who presents with a chief complaint of CAD s/p CABG (19 Feb 2025 06:39)    Being followed by ID for        Interval history:  No other acute events      ROS:  No cough,SOB,CP  No N/V/D  No abd pain  No urinary complaints  No HA  No joint or limb pain  No other complaints    PAST MEDICAL & SURGICAL HISTORY:  Type 2 diabetes mellitus      Hypertension      End stage renal disease  started HD 2/2019 T, Th, Sat via right chest permacath      Bone spur  right shoulder- hx of - sx done      Anemia      Injury of right wrist  hx of at age 14      History of hyperkalemia  before HD- K-6.2 - to repeat in am of sx, pt had HD today      S/P arthroscopy of right shoulder  2010      History of vascular access device  right chest permacath - 2/2019      H/O right wrist surgery  tendons repair - at age 14      AV fistula      H/O ventral hernia repair      S/P cholecystectomy        Allergies    No Known Allergies    Intolerances      Antimicrobials:      MEDICATIONS  (STANDING):  aMIOdarone    Tablet 200 milliGRAM(s) Oral daily  ascorbic acid 500 milliGRAM(s) Oral daily  aspirin  chewable 81 milliGRAM(s) Oral daily  atorvastatin 80 milliGRAM(s) Oral at bedtime  chlorhexidine 0.12% Liquid 15 milliLiter(s) Oral Mucosa every 12 hours  chlorhexidine 2% Cloths 1 Application(s) Topical daily  chlorhexidine 4% Liquid 1 Application(s) Topical <User Schedule>  dextrose 50% Injectable 50 milliLiter(s) IV Push every 15 minutes  epoetin ignacia (EPOGEN) Injectable 63943 Unit(s) IV Push <User Schedule>  ferrous    sulfate Liquid 300 milliGRAM(s) Enteral Tube daily  heparin  Infusion 500 Unit(s)/Hr (12.5 mL/Hr) IV Continuous <Continuous>  insulin lispro (ADMELOG) corrective regimen sliding scale   SubCutaneous every 6 hours  melatonin 5 milliGRAM(s) Oral at bedtime  methocarbamol 500 milliGRAM(s) Oral every 8 hours  metoprolol tartrate 25 milliGRAM(s) Oral two times a day  mirtazapine 7.5 milliGRAM(s) Oral at bedtime  Nephro-elicia 1 Tablet(s) Oral daily  pantoprazole  Injectable 40 milliGRAM(s) IV Push daily  sodium chloride 0.65% Nasal 1 Spray(s) Both Nostrils every 12 hours  sodium chloride 0.9%. 1000 milliLiter(s) (10 mL/Hr) IV Continuous <Continuous>  sodium zirconium cyclosilicate 10 Gram(s) Oral <User Schedule>  traZODone 50 milliGRAM(s) Oral at bedtime      Vital Signs Last 24 Hrs  T(C): 36.7 (02-20-25 @ 04:00), Max: 36.7 (02-20-25 @ 04:00)  T(F): 98 (02-20-25 @ 04:00), Max: 98 (02-20-25 @ 04:00)  HR: 60 (02-20-25 @ 08:49) (55 - 100)  BP: 140/68 (02-20-25 @ 08:00) (131/74 - 168/87)  BP(mean): 98 (02-20-25 @ 08:00) (92 - 113)  RR: 16 (02-20-25 @ 08:42) (12 - 44)  SpO2: 94% (02-20-25 @ 08:49) (79% - 100%)    Physical Exam:    Constitutional well preserved,comfortable,pleasant    HEENT PERRLA EOMI,No pallor or icterus    No oral exudate or erythema    Neck supple no JVD or LN    Chest Good AE,CTA    CVS RRR S1 S2 WNl No murmur or rub or gallop    Abd soft BS normal No tenderness no masses    Ext No cyanosis clubbing or edema    IV site no erythema tenderness or discharge    Joints no swelling or LOM    CNS AAO X 3 no focal    Lab Data:                          8.0    9.86  )-----------( 309      ( 20 Feb 2025 00:21 )             25.4       02-20    137  |  98  |  30[H]  ----------------------------<  162[H]  4.1   |  29  |  4.63[H]    Ca    8.5      20 Feb 2025 00:21  Phos  3.5     02-20  Mg     2.6     02-20    TPro  6.1  /  Alb  2.7[L]  /  TBili  0.4  /  DBili  x   /  AST  15  /  ALT  7[L]  /  AlkPhos  90  02-20              Bronchial  02-18-25   Commensal manjit consistent with body site  --    Few polymorphonuclear leukocytes per low power field  Rare Squamous epithelial cells per low power field  Few Gram Positive Cocci in Pairs and Chains per oil power field  Rare Gram Positive Cocci in Clusters per oil power field        WBC Count: 9.86 (02-20-25 @ 00:21)  WBC Count: 11.13 (02-19-25 @ 00:29)  WBC Count: 11.13 (02-18-25 @ 00:28)  WBC Count: 10.52 (02-17-25 @ 00:51)  WBC Count: 10.07 (02-16-25 @ 00:37)  WBC Count: 9.72 (02-15-25 @ 00:07)  WBC Count: 13.34 (02-14-25 @ 00:30)       Bilirubin Total: 0.4 mg/dL (02-20-25 @ 00:21)  Aspartate Aminotransferase (AST/SGOT): 15 U/L (02-20-25 @ 00:21)  Alanine Aminotransferase (ALT/SGPT): 7 U/L (02-20-25 @ 00:21)  Alkaline Phosphatase: 90 U/L (02-20-25 @ 00:21)  Bilirubin Total: 0.4 mg/dL (02-19-25 @ 00:32)  Aspartate Aminotransferase (AST/SGOT): 18 U/L (02-19-25 @ 00:32)  Alanine Aminotransferase (ALT/SGPT): 9 U/L (02-19-25 @ 00:32)  Alkaline Phosphatase: 97 U/L (02-19-25 @ 00:32)  Bilirubin Total: 0.4 mg/dL (02-18-25 @ 00:29)  Aspartate Aminotransferase (AST/SGOT): 18 U/L (02-18-25 @ 00:29)  Alanine Aminotransferase (ALT/SGPT): 11 U/L (02-18-25 @ 00:29)  Alkaline Phosphatase: 102 U/L (02-18-25 @ 00:29)  Bilirubin Total: 0.4 mg/dL (02-17-25 @ 00:52)  Aspartate Aminotransferase (AST/SGOT): 19 U/L (02-17-25 @ 00:52)  Alanine Aminotransferase (ALT/SGPT): 9 U/L (02-17-25 @ 00:52)  Alkaline Phosphatase: 100 U/L (02-17-25 @ 00:52)  Bilirubin Total: 0.3 mg/dL (02-16-25 @ 00:36)  Aspartate Aminotransferase (AST/SGOT): 17 U/L (02-16-25 @ 00:36)  Alanine Aminotransferase (ALT/SGPT): 8 U/L (02-16-25 @ 00:36)  Alkaline Phosphatase: 101 U/L (02-16-25 @ 00:36)         Patient is a 54y old  Male who presents with a chief complaint of CAD s/p CABG (19 Feb 2025 06:39)    Being followed by ID for        Interval history:  pt restig quietly  leg less numb  breathing stable -tolerating trach collar   No other acute events        PAST MEDICAL & SURGICAL HISTORY:  Type 2 diabetes mellitus      Hypertension      End stage renal disease  started HD 2/2019 T, Th, Sat via right chest permacath      Bone spur  right shoulder- hx of - sx done      Anemia      Injury of right wrist  hx of at age 14      History of hyperkalemia  before HD- K-6.2 - to repeat in am of sx, pt had HD today      S/P arthroscopy of right shoulder  2010      History of vascular access device  right chest permacath - 2/2019      H/O right wrist surgery  tendons repair - at age 14      AV fistula      H/O ventral hernia repair      S/P cholecystectomy        Allergies    No Known Allergies    Intolerances      Antimicrobials:      MEDICATIONS  (STANDING):  aMIOdarone    Tablet 200 milliGRAM(s) Oral daily  ascorbic acid 500 milliGRAM(s) Oral daily  aspirin  chewable 81 milliGRAM(s) Oral daily  atorvastatin 80 milliGRAM(s) Oral at bedtime  chlorhexidine 0.12% Liquid 15 milliLiter(s) Oral Mucosa every 12 hours  chlorhexidine 2% Cloths 1 Application(s) Topical daily  chlorhexidine 4% Liquid 1 Application(s) Topical <User Schedule>  dextrose 50% Injectable 50 milliLiter(s) IV Push every 15 minutes  epoetin ignacia (EPOGEN) Injectable 89687 Unit(s) IV Push <User Schedule>  ferrous    sulfate Liquid 300 milliGRAM(s) Enteral Tube daily  heparin  Infusion 500 Unit(s)/Hr (12.5 mL/Hr) IV Continuous <Continuous>  insulin lispro (ADMELOG) corrective regimen sliding scale   SubCutaneous every 6 hours  melatonin 5 milliGRAM(s) Oral at bedtime  methocarbamol 500 milliGRAM(s) Oral every 8 hours  metoprolol tartrate 25 milliGRAM(s) Oral two times a day  mirtazapine 7.5 milliGRAM(s) Oral at bedtime  Nephro-elicia 1 Tablet(s) Oral daily  pantoprazole  Injectable 40 milliGRAM(s) IV Push daily  sodium chloride 0.65% Nasal 1 Spray(s) Both Nostrils every 12 hours  sodium chloride 0.9%. 1000 milliLiter(s) (10 mL/Hr) IV Continuous <Continuous>  sodium zirconium cyclosilicate 10 Gram(s) Oral <User Schedule>  traZODone 50 milliGRAM(s) Oral at bedtime      Vital Signs Last 24 Hrs  T(C): 36.7 (02-20-25 @ 04:00), Max: 36.7 (02-20-25 @ 04:00)  T(F): 98 (02-20-25 @ 04:00), Max: 98 (02-20-25 @ 04:00)  HR: 60 (02-20-25 @ 08:49) (55 - 100)  BP: 140/68 (02-20-25 @ 08:00) (131/74 - 168/87)  BP(mean): 98 (02-20-25 @ 08:00) (92 - 113)  RR: 16 (02-20-25 @ 08:42) (12 - 44)  SpO2: 94% (02-20-25 @ 08:49) (79% - 100%)    Physical Exam:    Constitutional awake    HEENT PERRLA EOMI,No pallor or icterus    No oral exudate or erythema    Neck trach    Chest Good AE,CTA    CVS  S1 S2    Abd soft BS normal No tenderness    Ext No cyanosis clubbing or edema    IV site no erythema tenderness or discharge    Joints no swelling or LOM    CNS AAO X 3   left foot drop    Lab Data:                          8.0    9.86  )-----------( 309      ( 20 Feb 2025 00:21 )             25.4       02-20    137  |  98  |  30[H]  ----------------------------<  162[H]  4.1   |  29  |  4.63[H]    Ca    8.5      20 Feb 2025 00:21  Phos  3.5     02-20  Mg     2.6     02-20    TPro  6.1  /  Alb  2.7[L]  /  TBili  0.4  /  DBili  x   /  AST  15  /  ALT  7[L]  /  AlkPhos  90  02-20      < from: TTE Limited W or WO Ultrasound Enhancing Agent (02.18.25 @ 13:54) >  CONCLUSIONS:      1. Left ventricular systolic function is normal.   2. Enlarged right ventricular cavity size and mildly reduced right ventricular systolic function.   3. Left pleural effusion noted.   4. Trace pericardial effusion.    < end of copied text >          Bronchial  02-18-25   Commensal manjit consistent with body site  --    Few polymorphonuclear leukocytes per low power field  Rare Squamous epithelial cells per low power field  Few Gram Positive Cocci in Pairs and Chains per oil power field  Rare Gram Positive Cocci in Clusters per oil power field        WBC Count: 9.86 (02-20-25 @ 00:21)  WBC Count: 11.13 (02-19-25 @ 00:29)  WBC Count: 11.13 (02-18-25 @ 00:28)  WBC Count: 10.52 (02-17-25 @ 00:51)  WBC Count: 10.07 (02-16-25 @ 00:37)  WBC Count: 9.72 (02-15-25 @ 00:07)  WBC Count: 13.34 (02-14-25 @ 00:30)       Bilirubin Total: 0.4 mg/dL (02-20-25 @ 00:21)  Aspartate Aminotransferase (AST/SGOT): 15 U/L (02-20-25 @ 00:21)  Alanine Aminotransferase (ALT/SGPT): 7 U/L (02-20-25 @ 00:21)  Alkaline Phosphatase: 90 U/L (02-20-25 @ 00:21)    Bilirubin Total: 0.4 mg/dL (02-19-25 @ 00:32)  Aspartate Aminotransferase (AST/SGOT): 18 U/L (02-19-25 @ 00:32)  Alanine Aminotransferase (ALT/SGPT): 9 U/L (02-19-25 @ 00:32)  Alkaline Phosphatase: 97 U/L (02-19-25 @ 00:32)      < from: Xray Chest 1 View- PORTABLE-Urgent (Xray Chest 1 View- PORTABLE-Urgent .) (02.18.25 @ 13:44) >  IMPRESSION:  Mildly improved aeration of the left upper lung field, with near complete   opacification of the left lung likely representing a large pleural   effusion and/or atelectasis.    --- End of Report ---    < end of copied text >

## 2025-02-21 LAB
ALBUMIN SERPL ELPH-MCNC: 2.6 G/DL — LOW (ref 3.3–5)
ALBUMIN SERPL ELPH-MCNC: 2.7 G/DL — LOW (ref 3.3–5)
ALP SERPL-CCNC: 101 U/L — SIGNIFICANT CHANGE UP (ref 40–120)
ALP SERPL-CCNC: 91 U/L — SIGNIFICANT CHANGE UP (ref 40–120)
ALT FLD-CCNC: 10 U/L — SIGNIFICANT CHANGE UP (ref 10–45)
ALT FLD-CCNC: 8 U/L — LOW (ref 10–45)
ANION GAP SERPL CALC-SCNC: 12 MMOL/L — SIGNIFICANT CHANGE UP (ref 5–17)
ANION GAP SERPL CALC-SCNC: 12 MMOL/L — SIGNIFICANT CHANGE UP (ref 5–17)
APTT BLD: 167.3 SEC — CRITICAL HIGH (ref 24.5–35.6)
APTT BLD: 36.7 SEC — HIGH (ref 24.5–35.6)
APTT BLD: 42.3 SEC — HIGH (ref 24.5–35.6)
APTT BLD: 57.2 SEC — HIGH (ref 24.5–35.6)
AST SERPL-CCNC: 16 U/L — SIGNIFICANT CHANGE UP (ref 10–40)
AST SERPL-CCNC: 23 U/L — SIGNIFICANT CHANGE UP (ref 10–40)
BASOPHILS # BLD AUTO: 0.03 K/UL — SIGNIFICANT CHANGE UP (ref 0–0.2)
BASOPHILS # BLD AUTO: 0.04 K/UL — SIGNIFICANT CHANGE UP (ref 0–0.2)
BASOPHILS NFR BLD AUTO: 0.3 % — SIGNIFICANT CHANGE UP (ref 0–2)
BASOPHILS NFR BLD AUTO: 0.4 % — SIGNIFICANT CHANGE UP (ref 0–2)
BILIRUB SERPL-MCNC: 0.4 MG/DL — SIGNIFICANT CHANGE UP (ref 0.2–1.2)
BILIRUB SERPL-MCNC: 0.4 MG/DL — SIGNIFICANT CHANGE UP (ref 0.2–1.2)
BLD GP AB SCN SERPL QL: NEGATIVE — SIGNIFICANT CHANGE UP
BUN SERPL-MCNC: 27 MG/DL — HIGH (ref 7–23)
BUN SERPL-MCNC: 44 MG/DL — HIGH (ref 7–23)
CALCIUM SERPL-MCNC: 8.5 MG/DL — SIGNIFICANT CHANGE UP (ref 8.4–10.5)
CALCIUM SERPL-MCNC: 8.8 MG/DL — SIGNIFICANT CHANGE UP (ref 8.4–10.5)
CHLORIDE SERPL-SCNC: 96 MMOL/L — SIGNIFICANT CHANGE UP (ref 96–108)
CHLORIDE SERPL-SCNC: 96 MMOL/L — SIGNIFICANT CHANGE UP (ref 96–108)
CO2 SERPL-SCNC: 29 MMOL/L — SIGNIFICANT CHANGE UP (ref 22–31)
CO2 SERPL-SCNC: 29 MMOL/L — SIGNIFICANT CHANGE UP (ref 22–31)
CREAT SERPL-MCNC: 3.85 MG/DL — HIGH (ref 0.5–1.3)
CREAT SERPL-MCNC: 5.45 MG/DL — HIGH (ref 0.5–1.3)
EGFR: 12 ML/MIN/1.73M2 — LOW
EGFR: 12 ML/MIN/1.73M2 — LOW
EGFR: 18 ML/MIN/1.73M2 — LOW
EGFR: 18 ML/MIN/1.73M2 — LOW
EOSINOPHIL # BLD AUTO: 0.77 K/UL — HIGH (ref 0–0.5)
EOSINOPHIL # BLD AUTO: 0.85 K/UL — HIGH (ref 0–0.5)
EOSINOPHIL NFR BLD AUTO: 7.7 % — HIGH (ref 0–6)
EOSINOPHIL NFR BLD AUTO: 9.3 % — HIGH (ref 0–6)
FIBRINOGEN PPP-MCNC: 265 MG/DL — SIGNIFICANT CHANGE UP (ref 200–445)
GAS PNL BLDV: SIGNIFICANT CHANGE UP
GLUCOSE SERPL-MCNC: 167 MG/DL — HIGH (ref 70–99)
GLUCOSE SERPL-MCNC: 190 MG/DL — HIGH (ref 70–99)
HCT VFR BLD CALC: 26.4 % — LOW (ref 39–50)
HGB BLD-MCNC: 7.6 G/DL — LOW (ref 13–17)
HGB BLD-MCNC: 8.3 G/DL — LOW (ref 13–17)
IMM GRANULOCYTES NFR BLD AUTO: 0.9 % — SIGNIFICANT CHANGE UP (ref 0–0.9)
IMM GRANULOCYTES NFR BLD AUTO: 0.9 % — SIGNIFICANT CHANGE UP (ref 0–0.9)
INR BLD: 1.2 RATIO — HIGH (ref 0.85–1.16)
INR BLD: 1.27 RATIO — HIGH (ref 0.85–1.16)
LYMPHOCYTES # BLD AUTO: 0.81 K/UL — LOW (ref 1–3.3)
LYMPHOCYTES # BLD AUTO: 0.88 K/UL — LOW (ref 1–3.3)
LYMPHOCYTES # BLD AUTO: 8.8 % — LOW (ref 13–44)
LYMPHOCYTES # BLD AUTO: 8.8 % — LOW (ref 13–44)
MAGNESIUM SERPL-MCNC: 2.4 MG/DL — SIGNIFICANT CHANGE UP (ref 1.6–2.6)
MAGNESIUM SERPL-MCNC: 2.7 MG/DL — HIGH (ref 1.6–2.6)
MCHC RBC-ENTMCNC: 30.6 PG — SIGNIFICANT CHANGE UP (ref 27–34)
MCHC RBC-ENTMCNC: 31.1 G/DL — LOW (ref 32–36)
MCHC RBC-ENTMCNC: 31.1 PG — SIGNIFICANT CHANGE UP (ref 27–34)
MCHC RBC-ENTMCNC: 31.4 G/DL — LOW (ref 32–36)
MCV RBC AUTO: 98.4 FL — SIGNIFICANT CHANGE UP (ref 80–100)
MCV RBC AUTO: 98.9 FL — SIGNIFICANT CHANGE UP (ref 80–100)
MONOCYTES # BLD AUTO: 0.89 K/UL — SIGNIFICANT CHANGE UP (ref 0–0.9)
MONOCYTES NFR BLD AUTO: 13 % — SIGNIFICANT CHANGE UP (ref 2–14)
MONOCYTES NFR BLD AUTO: 8.9 % — SIGNIFICANT CHANGE UP (ref 2–14)
NEUTROPHILS # BLD AUTO: 6.22 K/UL — SIGNIFICANT CHANGE UP (ref 1.8–7.4)
NEUTROPHILS # BLD AUTO: 7.28 K/UL — SIGNIFICANT CHANGE UP (ref 1.8–7.4)
NEUTROPHILS NFR BLD AUTO: 67.7 % — SIGNIFICANT CHANGE UP (ref 43–77)
NEUTROPHILS NFR BLD AUTO: 73.3 % — SIGNIFICANT CHANGE UP (ref 43–77)
NRBC BLD AUTO-RTO: 0 /100 WBCS — SIGNIFICANT CHANGE UP (ref 0–0)
NRBC BLD AUTO-RTO: 0 /100 WBCS — SIGNIFICANT CHANGE UP (ref 0–0)
PHOSPHATE SERPL-MCNC: 3.3 MG/DL — SIGNIFICANT CHANGE UP (ref 2.5–4.5)
PLATELET # BLD AUTO: 281 K/UL — SIGNIFICANT CHANGE UP (ref 150–400)
PLATELET # BLD AUTO: 297 K/UL — SIGNIFICANT CHANGE UP (ref 150–400)
POTASSIUM SERPL-MCNC: 4.3 MMOL/L — SIGNIFICANT CHANGE UP (ref 3.5–5.3)
POTASSIUM SERPL-MCNC: 4.8 MMOL/L — SIGNIFICANT CHANGE UP (ref 3.5–5.3)
POTASSIUM SERPL-SCNC: 4.3 MMOL/L — SIGNIFICANT CHANGE UP (ref 3.5–5.3)
POTASSIUM SERPL-SCNC: 4.8 MMOL/L — SIGNIFICANT CHANGE UP (ref 3.5–5.3)
PROT SERPL-MCNC: 6.1 G/DL — SIGNIFICANT CHANGE UP (ref 6–8.3)
PROT SERPL-MCNC: 6.6 G/DL — SIGNIFICANT CHANGE UP (ref 6–8.3)
PROTHROM AB SERPL-ACNC: 13.8 SEC — HIGH (ref 9.9–13.4)
PROTHROM AB SERPL-ACNC: 14.4 SEC — HIGH (ref 9.9–13.4)
RBC # BLD: 2.67 M/UL — LOW (ref 4.2–5.8)
RBC # FLD: 21.3 % — HIGH (ref 10.3–14.5)
RBC # FLD: 21.7 % — HIGH (ref 10.3–14.5)
RH IG SCN BLD-IMP: POSITIVE — SIGNIFICANT CHANGE UP
SODIUM SERPL-SCNC: 137 MMOL/L — SIGNIFICANT CHANGE UP (ref 135–145)
SODIUM SERPL-SCNC: 137 MMOL/L — SIGNIFICANT CHANGE UP (ref 135–145)
WBC # BLD: 9.18 K/UL — SIGNIFICANT CHANGE UP (ref 3.8–10.5)
WBC # BLD: 9.95 K/UL — SIGNIFICANT CHANGE UP (ref 3.8–10.5)
WBC # FLD AUTO: 9.18 K/UL — SIGNIFICANT CHANGE UP (ref 3.8–10.5)
WBC # FLD AUTO: 9.95 K/UL — SIGNIFICANT CHANGE UP (ref 3.8–10.5)

## 2025-02-21 PROCEDURE — 99291 CRITICAL CARE FIRST HOUR: CPT | Mod: 25

## 2025-02-21 PROCEDURE — 71045 X-RAY EXAM CHEST 1 VIEW: CPT | Mod: 26

## 2025-02-21 PROCEDURE — 36569 INSJ PICC 5 YR+ W/O IMAGING: CPT | Mod: RT

## 2025-02-21 RX ORDER — FENTANYL CITRATE-0.9 % NACL/PF 100MCG/2ML
25 SYRINGE (ML) INTRAVENOUS ONCE
Refills: 0 | Status: DISCONTINUED | OUTPATIENT
Start: 2025-02-21 | End: 2025-02-21

## 2025-02-21 RX ADMIN — Medication 300 MILLIGRAM(S): at 12:21

## 2025-02-21 RX ADMIN — METOPROLOL SUCCINATE 25 MILLIGRAM(S): 50 TABLET, EXTENDED RELEASE ORAL at 17:49

## 2025-02-21 RX ADMIN — Medication 50 MILLIGRAM(S): at 23:03

## 2025-02-21 RX ADMIN — METHOCARBAMOL 500 MILLIGRAM(S): 500 TABLET, FILM COATED ORAL at 09:21

## 2025-02-21 RX ADMIN — EPOETIN ALFA 10000 UNIT(S): 10000 SOLUTION INTRAVENOUS; SUBCUTANEOUS at 13:32

## 2025-02-21 RX ADMIN — Medication 1 APPLICATION(S): at 05:57

## 2025-02-21 RX ADMIN — Medication 5 MILLIGRAM(S): at 23:03

## 2025-02-21 RX ADMIN — Medication 500 MILLIGRAM(S): at 13:23

## 2025-02-21 RX ADMIN — Medication 40 MILLIGRAM(S): at 13:22

## 2025-02-21 RX ADMIN — Medication 1 TABLET(S): at 13:22

## 2025-02-21 RX ADMIN — Medication 1 APPLICATION(S): at 23:03

## 2025-02-21 RX ADMIN — AMIODARONE HYDROCHLORIDE 200 MILLIGRAM(S): 50 INJECTION, SOLUTION INTRAVENOUS at 05:51

## 2025-02-21 RX ADMIN — ATORVASTATIN CALCIUM 80 MILLIGRAM(S): 80 TABLET, FILM COATED ORAL at 23:02

## 2025-02-21 RX ADMIN — INSULIN LISPRO 2: 100 INJECTION, SOLUTION INTRAVENOUS; SUBCUTANEOUS at 13:20

## 2025-02-21 RX ADMIN — Medication 81 MILLIGRAM(S): at 13:22

## 2025-02-21 RX ADMIN — Medication 15 MILLILITER(S): at 17:49

## 2025-02-21 RX ADMIN — HEPARIN SODIUM 13 UNIT(S)/HR: 1000 INJECTION INTRAVENOUS; SUBCUTANEOUS at 09:22

## 2025-02-21 RX ADMIN — METOPROLOL SUCCINATE 25 MILLIGRAM(S): 50 TABLET, EXTENDED RELEASE ORAL at 05:51

## 2025-02-21 RX ADMIN — Medication 1 SPRAY(S): at 05:51

## 2025-02-21 RX ADMIN — MIRTAZAPINE 7.5 MILLIGRAM(S): 30 TABLET, FILM COATED ORAL at 23:03

## 2025-02-21 RX ADMIN — Medication 15 MILLILITER(S): at 05:51

## 2025-02-21 RX ADMIN — Medication 1 SPRAY(S): at 18:54

## 2025-02-21 RX ADMIN — Medication 25 MICROGRAM(S): at 11:10

## 2025-02-21 RX ADMIN — Medication 25 MICROGRAM(S): at 11:30

## 2025-02-21 RX ADMIN — METHOCARBAMOL 500 MILLIGRAM(S): 500 TABLET, FILM COATED ORAL at 01:43

## 2025-02-21 RX ADMIN — METHOCARBAMOL 500 MILLIGRAM(S): 500 TABLET, FILM COATED ORAL at 17:49

## 2025-02-21 NOTE — PROCEDURE NOTE - PRACTITIONER PERFORMING THE TIME OUT
Klein
myself
MYRA Rico
myself
Neno Somers, Blaze Finch
Louis Lilly, PA
Blaze Finch MD
Klein
Shiela Cuellar

## 2025-02-21 NOTE — PROGRESS NOTE ADULT - ASSESSMENT
54M with PMH of HTN, HLD, ESRD on HD MWF via LUE AVF, ascites (requiring repeat paracenteses q4-6wks), NICM with moderate LV dysfunction w/ EF 35-40%(2023) and CAD s/p atherectomy + SALLIE to D1(4/11/2024) presents to [facility] transferred from [facility] for CABG eval  Nephro on baord for HD    ESRD on HD   Nephrologist Dr. Bailey  [facility] Schedule / Access:  E AVF  Center: [facility]  Pt refused HD 2/18 as wants to return to [facility] schedule  Pt s/p HD on 2/19.  HD today.  Keep MAP>65  Renal Diet  HD consent in chart     Hyper/Hypokalemia  Pt intermittently Hyperkalemia  HD as scheduled  now improved  Low K diet.  C/W Lokelma 10gm on nonHD days.  Monitor K.    HTN  BP fluctuating   UF w/ HD as BP permits.  Low sodium diet.  Management per ICU  Monitor BP     CKD MBD  Check PTH   Low PO4 diet.  Not on binder.  Monitor Ca , PO4 daily     Anemia  CRISTIANE with HD  Repeat iron studies.  Transfuse if hgb <7    CAD with multivessel disease  CTICU following  s/p CABG 12/30    Hypoxic respiratory  failure requiring Trach  Per surgery  S/p bronchoscopy, pulm following

## 2025-02-21 NOTE — PROGRESS NOTE ADULT - SUBJECTIVE AND OBJECTIVE BOX
[facility]   MD DREW VIZCARRA MD ANGELA WONG, MYRA HOFF, NP    TEL:  OFFICE: [phone]  From 5pm-7am Answering Service [phone]    -- RENAL FOLLOW UP NOTE ---Date of Service 02-21-25 @ 16:42    Patient is a 54y old  Male who presents with a chief complaint of s/p CABG x3.    Patient seen and examined at bedside, in ICU. No chest pain/SOB.    VITALS:  T(F): 97.6 (02-21-25 @ 16:21), Max: 98.8 (02-20-25 @ 17:00)  HR: 61 (02-21-25 @ 16:21)  BP: 168/81 (02-21-25 @ 16:21)  RR: 16 (02-21-25 @ 16:21)  SpO2: 100% (02-21-25 @ 16:21)  Wt(kg): --    02-20 @ 07:01  -  02-21 @ 07:00  --------------------------------------------------------  IN: 2441 mL / OUT: 0 mL / NET: 2441 mL    02-21 @ 07:01  -  02-21 @ 16:42  --------------------------------------------------------  IN: 817 mL / OUT: 1500 mL / NET: -683 mL      PHYSICAL EXAM:  General: NAD  Neck: No JVD  Respiratory: CTAB, no wheezes, rales or rhonchi.  + trach  Cardiovascular: S1, S2, RRR  Gastrointestinal: BS+, soft, NT/ND  Extremities: No peripheral edema    Hospital Medications:   MEDICATIONS  (STANDING):  aMIOdarone    Tablet 200 milliGRAM(s) Oral daily  ascorbic acid 500 milliGRAM(s) Oral daily  aspirin  chewable 81 milliGRAM(s) Oral daily  atorvastatin 80 milliGRAM(s) Oral at bedtime  chlorhexidine 0.12% Liquid 15 milliLiter(s) Oral Mucosa every 12 hours  chlorhexidine 2% Cloths 1 Application(s) Topical daily  chlorhexidine 4% Liquid 1 Application(s) Topical <User Schedule>  dextrose 50% Injectable 50 milliLiter(s) IV Push every 15 minutes  epoetin ignacia (EPOGEN) Injectable 92573 Unit(s) IV Push <User Schedule>  ferrous    sulfate Liquid 300 milliGRAM(s) Enteral Tube daily  heparin  Infusion 500 Unit(s)/Hr (13 mL/Hr) IV Continuous <Continuous>  insulin lispro (ADMELOG) corrective regimen sliding scale   SubCutaneous every 6 hours  melatonin 5 milliGRAM(s) Oral at bedtime  methocarbamol 500 milliGRAM(s) Oral every 8 hours  metoprolol tartrate 25 milliGRAM(s) Oral two times a day  mirtazapine 7.5 milliGRAM(s) Oral at bedtime  Nephro-elicia 1 Tablet(s) Oral daily  pantoprazole  Injectable 40 milliGRAM(s) IV Push daily  sodium chloride 0.65% Nasal 1 Spray(s) Both Nostrils every 12 hours  sodium chloride 0.9%. 1000 milliLiter(s) (10 mL/Hr) IV Continuous <Continuous>  sodium zirconium cyclosilicate 10 Gram(s) Oral <User Schedule>  traZODone 50 milliGRAM(s) Oral at bedtime      LABS:  02-21    137  |  96  |  44[H]  ----------------------------<  167[H]  4.3   |  29  |  5.45[H]    Ca    8.5      21 Feb 2025 00:36  Phos  3.7     02-21  Mg     2.7     02-21    TPro  6.1  /  Alb  2.6[L]  /  TBili  0.4  /  DBili      /  AST  16  /  ALT  8[L]  /  AlkPhos  91  02-21    Creatinine Trend: 5.45 <--, 4.63 <--, 6.07 <--, 5.01 <--, 4.05 <--, 2.93 <--, 3.29 <--    Albumin: 2.6 g/dL (02-21 @ 00:36)  Phosphorus: 3.7 mg/dL (02-21 @ 00:36)                          7.6    9.18  )-----------( 297      ( 21 Feb 2025 00:38 )             24.4     Urine Studies:  Urinalysis - [02-21-25 @ 00:36]      Color  / Appearance  / SG  / pH       Gluc 167 / Ketone   / Bili  / Urobili        Blood  / Protein  / Leuk Est  / Nitrite       RBC  / WBC  / Hyaline  / Gran  / Sq Epi  / Non Sq Epi  / Bacteria       Iron 29, TIBC 143, %sat 20      [12-19-24 @ 05:00]  Ferritin 904      [12-19-24 @ 05:00]  TSH 4.75      [12-24-24 @ 06:50]

## 2025-02-21 NOTE — PROGRESS NOTE ADULT - SUBJECTIVE AND OBJECTIVE BOX
MR#[id number]  PATIENT NAME:RAAD CANO    DATE OF SERVICE: 02-21-25 @ 07:30  Patient was seen and examined by Solo Quick MD on    02-21-25 @ 07:30 .  Interim events noted.Consultant notes ,Labs,Telemetry reviewed by me       HOSPITAL COURSE: HPI:  54M with PMH of HTN, HLD, ESRD on HD MWF via LUE AVF, ascites (requiring repeat paracenteses q4-6wks), NICM with moderate LV dysfunction w/ EF 35-40%(2023) and CAD s/p atherectomy + SALLIE to D1(4/11/2024) presents to [facility] transferred from [facility]. Patient initially presented to [facility] ED with shortness of breath. Of note he was recently admitted from 09/27-12/02 for acute cholecystitis s/p cholecystectomy. In [facility] ED, pt was hypoxic to 86% on RA, trop 1.7K, EKG no new changes, CXR fluid overload. Admitted for AHRF 2/2 fluid overload. Pt received HD on 12/18, 12/19, 12/20 and 12/22. DAPT was resumed, TTE showed improvement in LVEF to 40-45%. Stress test was abnormal with 1mm inferior STD, reversible ischemia in the inferior wall and fixed defects in the lateral, anterior and apical walls with post stress EF of 24%. Pt was then transferred to [facility] for cardiac cath which showed pLAD 70% ISR, mLCx 70%, D1 severe dz. Patient now transferred to [facility] CTS Dr. Rivers for CABG eval. Patient denies any current SOB or chest pain on admission. Otherwise denies headaches, abdominal pain, urinary or bowel changes, fevers, chills, N/V/D, sick contacts.  (24 Dec 2024 05:07)      INTERIM EVENTS:Patient seen at bedside ,interim events noted.  12/23 Cardiac cath  pLAD 70% ISR, mLCx 70%, D1 severe dz.   12/31-POD#1-C3L free LIMA-LAD; SVG-Diag; SVG-leftPL Awake OOB extubated Sinus rhythm on Dobutamine gtt  02/04-Awake alert on T collar-TC during the day and PC: 12/10//15//50% at night-interrmittent rapid AF noted  02/07-Awake had Bronch yesterday-  02/08-Seen by [facility] for LLE weakness appears peripheral neuropathy  02/10-Awake on T Collar trial is ambulsting-AF  02/12-Awake still c/o orthopnea   02/14-sniff test  showed paradoxical diaphragmatic motion    02/15-Awake AF   02/16-AWake c/o buttock pain AF on T Collar  02/17-Awake tolerating T Collar is ambulating,AF  02/18-Awake on HFTC Atrial Flutter~~80's  02/19 Had Bronch yesterday on vent HS remains in Atrial Flutter  02/21-Awake on HFTC at night Ambulating-Atrial Flutter      MEDICATIONS  (STANDING):  aMIOdarone    Tablet 200 milliGRAM(s) Oral daily  ascorbic acid 500 milliGRAM(s) Oral daily  aspirin  chewable 81 milliGRAM(s) Oral daily  atorvastatin 80 milliGRAM(s) Oral at bedtime  chlorhexidine 0.12% Liquid 15 milliLiter(s) Oral Mucosa every 12 hours  chlorhexidine 2% Cloths 1 Application(s) Topical daily  chlorhexidine 4% Liquid 1 Application(s) Topical <User Schedule>  dextrose 50% Injectable 50 milliLiter(s) IV Push every 15 minutes  epoetin ignacia (EPOGEN) Injectable 85179 Unit(s) IV Push <User Schedule>  ferrous    sulfate Liquid 300 milliGRAM(s) Enteral Tube daily  heparin  Infusion 500 Unit(s)/Hr (13 mL/Hr) IV Continuous <Continuous>  insulin lispro (ADMELOG) corrective regimen sliding scale   SubCutaneous every 6 hours  melatonin 5 milliGRAM(s) Oral at bedtime  methocarbamol 500 milliGRAM(s) Oral every 8 hours  metoprolol tartrate 25 milliGRAM(s) Oral two times a day  mirtazapine 7.5 milliGRAM(s) Oral at bedtime  Nephro-elicia 1 Tablet(s) Oral daily  pantoprazole  Injectable 40 milliGRAM(s) IV Push daily  sodium chloride 0.65% Nasal 1 Spray(s) Both Nostrils every 12 hours  sodium chloride 0.9%. 1000 milliLiter(s) (10 mL/Hr) IV Continuous <Continuous>  sodium zirconium cyclosilicate 10 Gram(s) Oral <User Schedule>  traZODone 50 milliGRAM(s) Oral at bedtime    MEDICATIONS  (PRN):  acetaminophen     Tablet .. 650 milliGRAM(s) Oral every 6 hours PRN Mild Pain (1 - 3)  lidocaine/prilocaine Cream 1 Application(s) Topical daily PRN pre-HD  sodium chloride 0.9% lock flush 10 milliLiter(s) IV Push every 1 hour PRN Pre/post blood products, medications, blood draw, and to maintain line patency            REVIEW OF SYSTEMS:  Constitutional: [ ] fever, [ ]weight loss,  [ ]fatigue [ ]weight gain  Eyes: [ ] visual changes  Respiratory: [ ]shortness of breath;  [ ] cough, [ ]wheezing, [ ]chills, [ ]hemoptysis  Cardiovascular: [ ] chest pain, [ ]palpitations, [ ]dizziness,  [ ]leg swelling[ ]orthopnea[ ]PND  Gastrointestinal: [ ] abdominal pain, [ ]nausea, [ ]vomiting,  [ ]diarrhea [ ]Constipation [ ]Melena  Genitourinary: [ ] dysuria, [ ] hematuria [ ]Vigil  Neurologic: [ ] headaches [ ] tremors[ ]weakness [ ]Paralysis Right[ ] Left[ ]  Skin: [ ] itching, [ ]burning, [ ] rashes  Endocrine: [ ] heat or cold intolerance  Musculoskeletal: [ ] joint pain or swelling; [ ] muscle, back, or extremity pain  Psychiatric: [ ] depression, [ ]anxiety, [ ]mood swings, or [ ]difficulty sleeping  Hematologic: [ ] easy bruising, [ ] bleeding gums    [ ] All remaining systems negative except as per above.   [ ]Unable to obtain.  [x] No change in ROS since admission      Vital Signs Last 24 Hrs  T(C): 37 (21 Feb 2025 04:00), Max: 37.1 (20 Feb 2025 17:00)  T(F): 98.6 (21 Feb 2025 04:00), Max: 98.8 (20 Feb 2025 17:00)  HR: 59 (21 Feb 2025 07:00) (59 - 111)  BP: 159/74 (21 Feb 2025 07:00) (105/53 - 168/74)  BP(mean): 107 (21 Feb 2025 07:00) (76 - 112)  RR: 14 (21 Feb 2025 07:00) (11 - 26)  SpO2: 100% (21 Feb 2025 07:00) (93% - 100%)    Parameters below as of 21 Feb 2025 04:00  Patient On (Oxygen Delivery Method): BiPAP/CPAP    O2 Concentration (%): 50  I&O's Summary    20 Feb 2025 07:01  -  21 Feb 2025 07:00  --------------------------------------------------------  IN: 2441 mL / OUT: 0 mL / NET: 2441 mL        PHYSICAL EXAM:  General: No acute distress BMI-28  HEENT: EOMI, PERRL  Neck: Supple, [ ] JVD  Lungs: Equal air entry bilaterally; [ ] rales [ ] wheezing [ ] rhonchi  Heart: Irregular rate and rhythm; [x ] murmur   2/6 [ x] systolic [ ] diastolic [ ] radiation[ ] rubs [ ]  gallops  Abdomen: Nontender, bowel sounds present  Extremities: No clubbing, cyanosis, [ ] edema [ ]Pulses  equal and intact  Nervous system:  Alert & Oriented X3, no focal deficits  Psychiatric: Normal affect  Skin: No rashes or lesions    LABS:  02-21    137  |  96  |  44[H]  ----------------------------<  167[H]  4.3   |  29  |  5.45[H]    Ca    8.5      21 Feb 2025 00:36  Phos  3.7     02-21  Mg     2.7     02-21    TPro  6.1  /  Alb  2.6[L]  /  TBili  0.4  /  DBili  x   /  AST  16  /  ALT  8[L]  /  AlkPhos  91  02-21    Creatinine Trend: 5.45<--, 4.63<--, 6.07<--, 5.01<--, 4.05<--, 2.93<--                        7.6    9.18  )-----------( 297      ( 21 Feb 2025 00:38 )             24.4     PT/INR - ( 21 Feb 2025 00:38 )   PT: 14.4 sec;   INR: 1.27 ratio         PTT - ( 21 Feb 2025 04:54 )  PTT:42.3 sec     TTE Limited W or WO Ultrasound Enhancing Agent (02.18.25 @ 13:54) >  CONCLUSIONS:      1. Left ventricular systolic function is normal.   2. Enlarged right ventricular cavity size and mildly reduced right ventricular systolic function.   3. Left pleural effusion noted.   4. Trace pericardial effusion.       US Abdomen Upper Quadrant Right (02.18.25 @ 14:04) >  IMPRESSION:  *  Moderate volume ascites.  *  Small right pleural effusion.       Xray Chest 1 View- PORTABLE-Urgent (Xray Chest 1 View- PORTABLE-Urgent .) (02.18.25 @ 13:44) >  IMPRESSION:  Mildly improved aeration of the left upper lung field, with near complete  opacification of the left lung likely representing a large pleural effusion and/or atelectasis.

## 2025-02-21 NOTE — PROGRESS NOTE ADULT - SUBJECTIVE AND OBJECTIVE BOX
Patient seen and examined at the bedside.    Remains critically ill on continuous ICU monitoring, at risk for life threatening decompensation.  All Labs, data reviewed. Plan of care discussed in length during multi-disciplinary ICU rounds.       Brief Summary:  55 yo M with multiple medical problems including CAD s/p PCI in 2024 s/p CABG x 3 on 24  1: Intubated for hypoxia & left lung white out s/p awake bronch with removal of copious mucopurulent secretions  : s/p IABP insertion, sepsis/septic shock due to E coli   ESBL pneumonia, collapsed Left Lung, s/p multiple bronch    tracheostomy tube placement    VRE E. Faecium Bcx   +HSV PCR BAL   tissue cx from back abscess - Serratia   - - intermittent bradycardia/junctional episodes with hypotension  2/3: off , off theophylline. iHD 1L UF  : bronch for Left lung collapse wound vac, 2decho w/ moderate pericardial effusion - similar as before, US abd: small - moderate ascites - monitor at this time.  Wound vac placed for sacral wounds  : iHD-1L UF  : bronch today off positive pressure. : MRSA and serratia from cyst on the back. Plan for Levaquin + Vanc x 5 days   : concern for left food drop   2/10 : no acute issues - CXR shows improving left sided aeration, pt subjectively reports having difficulty while lying flat  : s/p Paracentesis 3.5 L removed, leukocytosis   : Ascites fluid PMN < 250 (less likely SBP)   sniff test yesterday showed paradoxical diaphragmatic motion    : mucus plugging but able to clear airway with aggressive pulmonary toileting.   : no vent requirement overnight, able to mobilize secretion with pulm toileting  hyperkalemia post HD - workup for possible recirculation underway   : On TC X 48 hours - ambulating well, no mucus plugging events    24 Hour events:  On TC ambulating well  Remains in A flutter  Overnight HFTC, TC during the day   HD yesterday for -1.5L  Bronch     Objective:  Vital Signs Last 24 Hrs  T(C): 37.1 (2025 08:00), Max: 37.1 (2025 17:00)  T(F): 98.8 (2025 08:00), Max: 98.8 (2025 17:00)  HR: 59 (2025 10:00) (59 - 111)  BP: 145/67 (2025 10:00) (105/53 - 168/74)  BP(mean): 96 (2025 10:00) (76 - 112)  RR: 13 (2025 10:00) (11 - 26)  SpO2: 100% (2025 10:00) (94% - 100%)    Parameters below as of 2025 09:02  Patient On (Oxygen Delivery Method): [facility]  O2 Flow (L/min): 40  O2 Concentration (%): 40    Mode: standby  FiO2: 40    Physical Exam:  General: Alert and interactive  Neurology: Oriented, following commands  Respiratory: Clear bilaterally, Trach site ok.  CV: A flutter  Abdominal: Soft, Nontender  Extremities: Warm, well-perfused  -------------------------------------------------------------------------------------------------------------------------------    Labs:                        7.6    9.18  )-----------( 297      ( 2025 00:38 )             24.4     02-21    137  |  96  |  44[H]  ----------------------------<  167[H]  4.3   |  29  |  5.45[H]    Ca    8.5      2025 00:36  Phos  3.7     02-21  Mg     2.7     02-21    TPro  6.1  /  Alb  2.6[L]  /  TBili  0.4  /  DBili  x   /  AST  16  /  ALT  8[L]  /  AlkPhos  91  -    LIVER FUNCTIONS - ( 2025 00:36 )  Alb: 2.6 g/dL / Pro: 6.1 g/dL / ALK PHOS: 91 U/L / ALT: 8 U/L / AST: 16 U/L / GGT: x           PT/INR - ( 2025 00:38 )   PT: 14.4 sec;   INR: 1.27 ratio       PTT - ( 2025 04:54 )  PTT:42.3 sec    ALL MEDICATIONS   MEDICATIONS  (STANDING):  aMIOdarone    Tablet 200 milliGRAM(s) Oral daily  ascorbic acid 500 milliGRAM(s) Oral daily  aspirin  chewable 81 milliGRAM(s) Oral daily  atorvastatin 80 milliGRAM(s) Oral at bedtime  chlorhexidine 0.12% Liquid 15 milliLiter(s) Oral Mucosa every 12 hours  chlorhexidine 2% Cloths 1 Application(s) Topical daily  chlorhexidine 4% Liquid 1 Application(s) Topical <User Schedule>  dextrose 50% Injectable 50 milliLiter(s) IV Push every 15 minutes  epoetin ignacia (EPOGEN) Injectable 33808 Unit(s) IV Push <User Schedule>  fentaNYL    Injectable 25 MICROGram(s) IV Push once  ferrous    sulfate Liquid 300 milliGRAM(s) Enteral Tube daily  heparin  Infusion 500 Unit(s)/Hr (13 mL/Hr) IV Continuous <Continuous>  insulin lispro (ADMELOG) corrective regimen sliding scale   SubCutaneous every 6 hours  melatonin 5 milliGRAM(s) Oral at bedtime  methocarbamol 500 milliGRAM(s) Oral every 8 hours  metoprolol tartrate 25 milliGRAM(s) Oral two times a day  mirtazapine 7.5 milliGRAM(s) Oral at bedtime  Nephro-elicia 1 Tablet(s) Oral daily  pantoprazole  Injectable 40 milliGRAM(s) IV Push daily  sodium chloride 0.65% Nasal 1 Spray(s) Both Nostrils every 12 hours  sodium chloride 0.9%. 1000 milliLiter(s) (10 mL/Hr) IV Continuous <Continuous>  sodium zirconium cyclosilicate 10 Gram(s) Oral <User Schedule>  traZODone 50 milliGRAM(s) Oral at bedtime    MEDICATIONS  (PRN):  acetaminophen     Tablet .. 650 milliGRAM(s) Oral every 6 hours PRN Mild Pain (1 - 3)  lidocaine/prilocaine Cream 1 Application(s) Topical daily PRN pre-HD  sodium chloride 0.9% lock flush 10 milliLiter(s) IV Push every 1 hour PRN Pre/post blood products, medications, blood draw, and to maintain line patency  ------------------------------------------------------------------------------------------------------------------------------  Assessment:  55 yo M s/p CABG x 3 on 24    Postop acute respiratory failure  Acute blood loss anemia  ESRD on iHD   hx of ESBL   hx of VRE  Serratia infection  MSRA infection   sacral decub     Plan:  ***Neuro***  Postoperative acute pain control with Robaxin and Tylenol  Trazodone, Remeron, and Melatonin nightly  PT ongoing    ***Cardiovascular***  s/p CABG x 3  A flutter - Tolerating beta blocker, PO Amio for Afib  ASA / Statin daily  TTE  ECHO : 1. Left ventricular systolic function is normal.   2. Enlarged right ventricular cavity size and mildly reduced right ventricular systolic function.   3. Left pleural effusion noted.   4. Trace pericardial effusion.    ***Pulmonary***  Postoperative acute respiratory insufficiency - HFTC   Tracheostomy    Mucomyst, normal saline nebs, Xopenex to help mobilize secretions  Left lung PNA, + e coli ESBL - cleared but now with VRE   Trach collar trials ongoing - HFTC at night, TC during the day   Being evaluated for possible left diaphragm plication  - since he is clinically improving will continue current conservative care for now  - thoracic team following  Needs aggressive pulm toileting   Bronchoscopy  for recurrent AUGUSTO opacification     ***GI***  Tolerating Tube feeds at goal  Protonix for stress ulcer prophylaxis.     ***Renal***  ESRD - tolerating iHD  Nephro-Elicia for renal support    ***ID***  no active infection - off antibiotics   Monitor cultures   BAL - commensal manjit    VRE BAL - Linezolid    Acyclovir for + HSV in BAL   Serratia on tissue cx and h/o ESBL PNA   : MRSA and serratia from cyst on the back. Levaquin + Vanco, completed course  Meropenem  -  (leukocytosis - empiric)    BAL commensal manjit     ***Endocrine***  Hyperglycemia - ISS    ***Hematology***  Acute blood loss anemia - no transfusion indication currently.  CBC, coags  Continue Epogen.  Heparin infusion (AF)     Wound:  s/p sacral debridement on  . wound vac placed on  and changed three times a week        ILillie MD, personally performed the services described in this documentation. All medical record entries made by the scribe were at my direction and in my presence. I have reviewed the chart and agree that the record reflects my personal performance and is accurate and complete  Electronically signed:   Lillie Kingsley MD  CT ICU attending     ICU time: ** mins     By signing my name below, I, Baldemar Hernandez, attest that this documentation has been prepared under the direction and in the presence of Lillie Kingsley MD  Electronically signed: Baldemar Hernandez, 25 @ 11:19             Patient seen and examined at the bedside.    Remains critically ill on continuous ICU monitoring, at risk for life threatening decompensation.  All Labs, data reviewed. Plan of care discussed in length during multi-disciplinary ICU rounds.     Brief Summary:  55 yo M with multiple medical problems including CAD s/p PCI in April 2024 s/p CABG x 3 on 12/30/24  1/2: Intubated for hypoxia & left lung white out s/p awake bronch with removal of copious mucopurulent secretions  1/4: s/p IABP insertion, sepsis/septic shock due to E coli   ESBL pneumonia, collapsed Left Lung, s/p multiple bronch   1/18 tracheostomy tube placement   1/22 VRE E. Faecium Bcx  1/24 +HSV PCR BAL  1/26 tissue cx from back abscess - Serratia   2/11: s/p Paracentesis 3.5 L removed, leukocytosis   2/12: Ascites fluid PMN < 250 (less likely SBP)   sniff test yesterday showed paradoxical diaphragmatic motion      24 Hour events:  On TC ambulating well  Overnight HFTC, TC during the day   iHd today  Bronch 2/18    Objective:  Vital Signs Last 24 Hrs  T(C): 37.1 (21 Feb 2025 08:00), Max: 37.1 (20 Feb 2025 17:00)  T(F): 98.8 (21 Feb 2025 08:00), Max: 98.8 (20 Feb 2025 17:00)  HR: 59 (21 Feb 2025 10:00) (59 - 111)  BP: 145/67 (21 Feb 2025 10:00) (105/53 - 168/74)  BP(mean): 96 (21 Feb 2025 10:00) (76 - 112)  RR: 13 (21 Feb 2025 10:00) (11 - 26)  SpO2: 100% (21 Feb 2025 10:00) (94% - 100%)    Parameters below as of 21 Feb 2025 09:02  Patient On (Oxygen Delivery Method): HFTC  O2 Flow (L/min): 40  O2 Concentration (%): 40    Mode: standby  FiO2: 40    Physical Exam:  General: Alert and interactive  Neurology: ambulates  Respiratory: Clear bilaterally, Trach site ok.  CV: A flutter  Abdominal: Soft, Nontender  Extremities: Warm, well-perfused  -------------------------------------------------------------------------------------------------------------------------------    Labs:                        7.6    9.18  )-----------( 297      ( 21 Feb 2025 00:38 )             24.4     02-21    137  |  96  |  44[H]  ----------------------------<  167[H]  4.3   |  29  |  5.45[H]    Ca    8.5      21 Feb 2025 00:36  Phos  3.7     02-21  Mg     2.7     02-21    TPro  6.1  /  Alb  2.6[L]  /  TBili  0.4  /  DBili  x   /  AST  16  /  ALT  8[L]  /  AlkPhos  91  02-21    LIVER FUNCTIONS - ( 21 Feb 2025 00:36 )  Alb: 2.6 g/dL / Pro: 6.1 g/dL / ALK PHOS: 91 U/L / ALT: 8 U/L / AST: 16 U/L / GGT: x           PT/INR - ( 21 Feb 2025 00:38 )   PT: 14.4 sec;   INR: 1.27 ratio       PTT - ( 21 Feb 2025 04:54 )  PTT:42.3 sec    ALL MEDICATIONS   MEDICATIONS  (STANDING):  aMIOdarone    Tablet 200 milliGRAM(s) Oral daily  ascorbic acid 500 milliGRAM(s) Oral daily  aspirin  chewable 81 milliGRAM(s) Oral daily  atorvastatin 80 milliGRAM(s) Oral at bedtime  chlorhexidine 0.12% Liquid 15 milliLiter(s) Oral Mucosa every 12 hours  chlorhexidine 2% Cloths 1 Application(s) Topical daily  chlorhexidine 4% Liquid 1 Application(s) Topical <User Schedule>  dextrose 50% Injectable 50 milliLiter(s) IV Push every 15 minutes  epoetin ignacia (EPOGEN) Injectable 01726 Unit(s) IV Push <User Schedule>  fentaNYL    Injectable 25 MICROGram(s) IV Push once  ferrous    sulfate Liquid 300 milliGRAM(s) Enteral Tube daily  heparin  Infusion 500 Unit(s)/Hr (13 mL/Hr) IV Continuous <Continuous>  insulin lispro (ADMELOG) corrective regimen sliding scale   SubCutaneous every 6 hours  melatonin 5 milliGRAM(s) Oral at bedtime  methocarbamol 500 milliGRAM(s) Oral every 8 hours  metoprolol tartrate 25 milliGRAM(s) Oral two times a day  mirtazapine 7.5 milliGRAM(s) Oral at bedtime  Nephro-elicia 1 Tablet(s) Oral daily  pantoprazole  Injectable 40 milliGRAM(s) IV Push daily  sodium chloride 0.65% Nasal 1 Spray(s) Both Nostrils every 12 hours  sodium chloride 0.9%. 1000 milliLiter(s) (10 mL/Hr) IV Continuous <Continuous>  sodium zirconium cyclosilicate 10 Gram(s) Oral <User Schedule>  traZODone 50 milliGRAM(s) Oral at bedtime    MEDICATIONS  (PRN):  acetaminophen     Tablet .. 650 milliGRAM(s) Oral every 6 hours PRN Mild Pain (1 - 3)  lidocaine/prilocaine Cream 1 Application(s) Topical daily PRN pre-HD  sodium chloride 0.9% lock flush 10 milliLiter(s) IV Push every 1 hour PRN Pre/post blood products, medications, blood draw, and to maintain line patency  ------------------------------------------------------------------------------------------------------------------------------  Assessment:  55 yo M s/p CABG x 3 on 12/30/24    Postop acute respiratory failure  Acute blood loss anemia  ESRD on iHD   hx of ESBL   hx of VRE  Serratia infection  MSRA infection   sacral decub     Plan:  ***Neuro***  Postoperative acute pain control with Robaxin and Tylenol  Trazodone, Remeron, and Melatonin nightly  PT ongoing    ***Cardiovascular***  s/p CABG x 3  A flutter - Tolerating beta blocker, PO Amio for Afib  ASA / Statin daily  TTE  ECHO 2/18: 1. Left ventricular systolic function is normal.   2. Enlarged right ventricular cavity size and mildly reduced right ventricular systolic function.   3. Left pleural effusion noted.   4. Trace pericardial effusion.    ***Pulmonary***  Acute postoperative respiratory failure  Tracheostomy 1/18   Mucomyst, normal saline nebs, Xopenex to help mobilize secretions  Left lung PNA, + e coli ESBL - cleared but now with VRE   Trach collar trials ongoing - HFTC at night, TC during the day   Being evaluated for possible left diaphragm plication  - since he is clinically improving will continue current conservative care for now  - thoracic team following  Needs aggressive pulm toileting   Bronchoscopy 2/18 for recurrent AUGUSTO opacification     ***GI***  Tolerating Tube feeds at goal  Protonix for stress ulcer prophylaxis.     ***Renal***  ESRD - tolerating iHD  Nephro-Elicia for renal support    ***ID***  no active infection - off antibiotics   Monitor cultures  1/28 BAL - commensal manjit   1/24 VRE BAL - Linezolid   1/24 Acyclovir for + HSV in BAL  1/26 Serratia on tissue cx and h/o ESBL PNA   2/1: MRSA and serratia from cyst on the back. Levaquin + Vanco, completed course  Meropenem 2/12 - 2/17 (leukocytosis - empiric)   2/28 BAL commensal manjit     ***Endocrine***  Hyperglycemia - ISS    ***Hematology***  Acute blood loss anemia - no transfusion indication currently.  CBC, coags  Continue Epogen.  Heparin infusion (AF)     Wound:  s/p sacral debridement on  2/1. wound vac placed on 2/4 and changed three times a week      I, Lillie Kingsley MD, personally performed the services described in this documentation. All medical record entries made by the scribe were at my direction and in my presence. I have reviewed the chart and agree that the record reflects my personal performance and is accurate and complete  Electronically signed:   Lillie Kingsley MD  CT ICU attending     ICU time: 41 mins     By signing my name below, I, Baldemar Hernandez, attest that this documentation has been prepared under the direction and in the presence of Lillie Kingsley MD  Electronically signed: Baldemar Hernandez, 02-21-25 @ 11:19

## 2025-02-21 NOTE — PROCEDURE NOTE - ATTENDING PROVIDER
Dr. Cassidy/Dr. Rivers
Blaze Finch
Dr. Rivers
Dr. Mendez/Dr. Rivers
Stella Hodges
Tita NICHOLS
Klein
Tita
Dr Rivers
Keith Thomson

## 2025-02-21 NOTE — PROGRESS NOTE ADULT - ASSESSMENT
54M with PMH of HTN, HLD, ESRD on HD MWF via LUE AVF, ascites (requiring repeat paracenteses q4-6wks), NICM with moderate LV dysfunction w/ EF 35-40%() and CAD s/p atherectomy + SALLIE to D1(2024) presents to [facility] transferred from [facility]. Patient initially presented to [facility] ED with shortness of breath. Of note he was recently admitted from - for acute cholecystitis s/p cholecystectomy. In [facility] ED, pt was hypoxic to 86% on RA, trop 1.7K, EKG no new changes, CXR fluid overload. Admitted for AHRF 2/2 fluid overload. Pt received HD on , ,  and . DAPT was resumed, TTE showed improvement in LVEF to 40-45%. Stress test was abnormal with 1mm inferior STD, reversible ischemia in the inferior wall and fixed defects in the lateral, anterior and apical walls with post stress EF of 24%.     Pt was then transferred to [facility] for cardiac cath which showed pLAD 70% ISR, mLCx 70%, D1 severe dz. Patient now transferred to [facility] CTS Dr. Rivers for CABG eval.# CAD s/p NSTEMI  Hx CAD s/p PCI LAD   Presented with ADHF elevated troponins  Positive NST1-Cath- pLAD 70% ISR, mLCx 70%, D1 severe dz.   TTE EF 35% Severe TRWas on Plavix-p2y12- 321 been off Plavix since -POD#1-C3L free LIMA-LAD; SVG-Diag; LNR-ubdvNC-Pchcjithn on  Sinus rhythm,Amio for AF prophylaxis  -Awake noted AF RVR no further bradyarrhythmias-Started BBlockers  02/10-Persistent AF tolerating Amio BBlockers Pericardial Effusion decreased consider resuming AC  -Seen by Thoracic-? Diaphragmatic plication,AF on HD  -Sniff test +  02/15-Awake AF,   -Tolerating TC-remains in AF/AFl is ambulating  -On HFTC ambulating AFl ~~80;s on heparin gtt  -s/p Bronch AFl ~~90's on Vent HS TC daytime     Atrial Flutter on TC durong day Ambulating        # Acute on Chronic Systolic Heart Failure now improved  EF-35% ,severe TR  Had HD post op  GDMT post op  LVEF improved post bypass   -TTE EF 40%,trace effusion  ECG c/w pericarditis-was on colchicine   01/15-TTE EF 55%  -TTE EF 60%   -Normal LV systolic function Moderate pericardial effusion  -On TC-HF and Vent support at nights   02/10-Vent HS with T Collar trial Ambulated Remains in AF  Repeat TTE Normal LV Systolic function Small Pericardial effusion decreased from 2/3        Vascular evaluated  AVGraft /?steal causing RV failure-'' Very low suspicion of steal Sd and AVF causing current clinical state. ''   VA Duplex Hemodialysis Access, Left (25 @ 13:35) >   Arterial flow volume of 1301 mL/m, and the venous flow volume of  1492 mL/m are consistent with a normally functioning dialysis access  fistula.      # Ascites  Hx chronic ascites  Has drainage q4-6 weeks  Last drained -4600mL  ? ascites related to severe TR  Had jlcnksbbnywc-Yavyiux-te growth   CT Abdomen 01/10-Large volume ascites-  US abdomen--Small to moderate volume abdominal/pelvic ascites  US abdomen  Moderate ascites    # ESRD  Now off CRRT transition to HD

## 2025-02-21 NOTE — PROCEDURE NOTE - NSPROCDETAILS_GEN_ALL_CORE
guidewire recovered/lumen(s) aspirated and flushed/sterile dressing applied/sterile technique, catheter placed/ultrasound guidance with use of sterile gel and probe cove
guidewire recovered/lumen(s) aspirated and flushed/sterile dressing applied/sterile technique, catheter placed/ultrasound guidance with use of sterile gel and probe cove
patient pre-oxygenated, tube inserted, placement confirmed
location identified, draped/prepped, sterile technique used, needle inserted/introduced/positive blood return obtained via catheter/connected to a pressurized flush line/sutured in place/Seldinger technique/all materials/supplies accounted for at end of procedure
location identified, sterile technique used, catheter introduced, fluid drained/Seldinger technique
location identified, draped/prepped, sterile technique used/sterile dressing applied/sterile technique, catheter placed/ultrasound guidance
guidewire recovered/lumen(s) aspirated and flushed/sterile dressing applied/sterile technique, catheter placed/ultrasound guidance with use of sterile gel and probe cove

## 2025-02-21 NOTE — PROCEDURE NOTE - NSINDICATIONS_GEN_A_CORE
procedural sedation during a procedure
arterial puncture to obtain ABG's
venous access
atrial fibrillation
airway protection
critical patient/monitoring purposes
critical illness/hemodynamic monitoring/venous access
suspected spontaneous bacterial peritonitis
respiratory compromise
Bronchoscopy/procedural sedation during a procedure
dialysis/CRRT
on inotropes/critical illness/hemodynamic monitoring/venous access
abscess
subcutaneous tissue/devitalized or nonviable tissue at the level of:

## 2025-02-21 NOTE — PROCEDURE NOTE - NSSITEPREP_SKIN_A_CORE
chlorhexidine
chlorhexidine/Adherence to aseptic technique: hand hygiene prior to donning barriers (gown, gloves), don cap and mask, sterile drape over patient
chlorhexidine
chlorhexidine
Adherence to aseptic technique: hand hygiene prior to donning barriers (gown, gloves), don cap and mask, sterile drape over patient
chlorhexidine

## 2025-02-22 LAB
APTT BLD: 41.8 SEC — HIGH (ref 24.5–35.6)
APTT BLD: 57.3 SEC — HIGH (ref 24.5–35.6)
APTT BLD: 60.9 SEC — HIGH (ref 24.5–35.6)
APTT BLD: 74.7 SEC — HIGH (ref 24.5–35.6)
APTT BLD: >200 SEC — CRITICAL HIGH (ref 24.5–35.6)
INR BLD: 1.23 RATIO — HIGH (ref 0.85–1.16)
INR BLD: 6.92 RATIO — CRITICAL HIGH (ref 0.85–1.16)
PROTHROM AB SERPL-ACNC: 14 SEC — HIGH (ref 9.9–13.4)
PROTHROM AB SERPL-ACNC: 77.4 SEC — HIGH (ref 9.9–13.4)

## 2025-02-22 PROCEDURE — 99291 CRITICAL CARE FIRST HOUR: CPT

## 2025-02-22 PROCEDURE — 71045 X-RAY EXAM CHEST 1 VIEW: CPT | Mod: 26

## 2025-02-22 RX ADMIN — Medication 1 APPLICATION(S): at 05:17

## 2025-02-22 RX ADMIN — METHOCARBAMOL 500 MILLIGRAM(S): 500 TABLET, FILM COATED ORAL at 17:25

## 2025-02-22 RX ADMIN — Medication 1 TABLET(S): at 11:16

## 2025-02-22 RX ADMIN — Medication 5 MILLIGRAM(S): at 22:05

## 2025-02-22 RX ADMIN — Medication 650 MILLIGRAM(S): at 11:15

## 2025-02-22 RX ADMIN — Medication 1 APPLICATION(S): at 22:50

## 2025-02-22 RX ADMIN — Medication 40 MILLIGRAM(S): at 11:16

## 2025-02-22 RX ADMIN — Medication 50 MILLIGRAM(S): at 22:05

## 2025-02-22 RX ADMIN — Medication 15 MILLILITER(S): at 17:26

## 2025-02-22 RX ADMIN — METOPROLOL SUCCINATE 25 MILLIGRAM(S): 50 TABLET, EXTENDED RELEASE ORAL at 05:18

## 2025-02-22 RX ADMIN — INSULIN LISPRO 2: 100 INJECTION, SOLUTION INTRAVENOUS; SUBCUTANEOUS at 01:02

## 2025-02-22 RX ADMIN — METOPROLOL SUCCINATE 25 MILLIGRAM(S): 50 TABLET, EXTENDED RELEASE ORAL at 17:25

## 2025-02-22 RX ADMIN — Medication 15 MILLILITER(S): at 05:17

## 2025-02-22 RX ADMIN — Medication 300 MILLIGRAM(S): at 11:16

## 2025-02-22 RX ADMIN — Medication 81 MILLIGRAM(S): at 11:16

## 2025-02-22 RX ADMIN — MIRTAZAPINE 7.5 MILLIGRAM(S): 30 TABLET, FILM COATED ORAL at 22:05

## 2025-02-22 RX ADMIN — HEPARIN SODIUM 13 UNIT(S)/HR: 1000 INJECTION INTRAVENOUS; SUBCUTANEOUS at 07:45

## 2025-02-22 RX ADMIN — Medication 500 MILLIGRAM(S): at 11:16

## 2025-02-22 RX ADMIN — METHOCARBAMOL 500 MILLIGRAM(S): 500 TABLET, FILM COATED ORAL at 00:02

## 2025-02-22 RX ADMIN — METHOCARBAMOL 500 MILLIGRAM(S): 500 TABLET, FILM COATED ORAL at 08:59

## 2025-02-22 RX ADMIN — AMIODARONE HYDROCHLORIDE 200 MILLIGRAM(S): 50 INJECTION, SOLUTION INTRAVENOUS at 05:17

## 2025-02-22 RX ADMIN — Medication 650 MILLIGRAM(S): at 11:45

## 2025-02-22 RX ADMIN — SODIUM ZIRCONIUM CYCLOSILICATE 10 GRAM(S): 5 POWDER, FOR SUSPENSION ORAL at 06:08

## 2025-02-22 RX ADMIN — ATORVASTATIN CALCIUM 80 MILLIGRAM(S): 80 TABLET, FILM COATED ORAL at 22:05

## 2025-02-22 NOTE — PROGRESS NOTE ADULT - ASSESSMENT
54M with PMH of HTN, HLD, ESRD on HD MWF via LUE AVF, ascites (requiring repeat paracenteses q4-6wks), NICM with moderate LV dysfunction w/ EF 35-40%(2023) and CAD s/p atherectomy + SALLIE to D1(4/11/2024) presents to [facility] transferred from [facility] for CABG eval  Nephro on board for HD needs.    ESRD on HD   Nephrologist Dr. Bailey  [facility] Schedule / Access:  E AVF  Center: [facility]  Pt refused HD 2/18 as wants to return to [facility] schedule  Pt s/p HD on 2/19 and 2/21.  Plan for HD on 2/24.  Keep MAP>65  Renal Diet  HD consent in chart     Hyper/Hypokalemia  Pt intermittently Hyperkalemia  HD as scheduled  now improved  Low K diet.  C/W Lokelma 10gm on nonHD days.  Monitor K.    HTN  BP fluctuating; controlled.  UF w/ HD as BP permits.  Low sodium diet.  Management per ICU  Monitor BP     CKD MBD  Check PTH   Low PO4 diet.  Not on binder.  Monitor Ca , PO4 daily     Anemia  CRISTIANE with HD  Repeat iron studies.  Transfuse if hgb <7    CAD with multivessel disease  CTICU following  s/p CABG 12/30    Hypoxic respiratory failure requiring Trach  Per surgery  S/p bronchoscopy, pulm following

## 2025-02-22 NOTE — PROGRESS NOTE ADULT - SUBJECTIVE AND OBJECTIVE BOX
[facility]   MD DREW VIZCARRA MD ANGELA WONG, PA QIAN CHEN, NP    TEL:  OFFICE: [phone]  From 5pm-7am Answering Service [phone]    -- RENAL FOLLOW UP NOTE ---Date of Service 02-22-25 @ 14:20    Patient is a 54y old  Male who presents with a chief complaint of CAD s/p CABG.    Patient seen and examined at bedside, in ICU.  No chest pain/SOB.    VITALS:  T(F): 98.2 (02-22-25 @ 12:00), Max: 99 (02-22-25 @ 04:00)  HR: 68 (02-22-25 @ 14:12)  BP: 112/51 (02-22-25 @ 13:00)  RR: 17 (02-22-25 @ 13:00)  SpO2: 94% (02-22-25 @ 14:12)  Wt(kg): --    02-21 @ 07:01  -  02-22 @ 07:00  --------------------------------------------------------  IN: 2149 mL / OUT: 1501 mL / NET: 648 mL    02-22 @ 07:01  -  02-22 @ 14:20  --------------------------------------------------------  IN: 598 mL / OUT: 0 mL / NET: 598 mL      PHYSICAL EXAM:  General: NAD  Neck: No JVD  Respiratory: CTAB, no wheezes, rales or rhonchi.  + trach.  No labored breathing or increased WOB.  Cardiovascular: S1, S2, RRR  Gastrointestinal: BS+, soft, NT/ND  Extremities: No peripheral edema    Hospital Medications:   MEDICATIONS  (STANDING):  aMIOdarone    Tablet 200 milliGRAM(s) Oral daily  ascorbic acid 500 milliGRAM(s) Oral daily  aspirin  chewable 81 milliGRAM(s) Oral daily  atorvastatin 80 milliGRAM(s) Oral at bedtime  chlorhexidine 0.12% Liquid 15 milliLiter(s) Oral Mucosa every 12 hours  chlorhexidine 2% Cloths 1 Application(s) Topical daily  chlorhexidine 4% Liquid 1 Application(s) Topical <User Schedule>  dextrose 50% Injectable 50 milliLiter(s) IV Push every 15 minutes  epoetin ignacia (EPOGEN) Injectable 42522 Unit(s) IV Push <User Schedule>  ferrous    sulfate Liquid 300 milliGRAM(s) Enteral Tube daily  heparin  Infusion 500 Unit(s)/Hr (13 mL/Hr) IV Continuous <Continuous>  insulin lispro (ADMELOG) corrective regimen sliding scale   SubCutaneous every 6 hours  melatonin 5 milliGRAM(s) Oral at bedtime  methocarbamol 500 milliGRAM(s) Oral every 8 hours  metoprolol tartrate 25 milliGRAM(s) Oral two times a day  mirtazapine 7.5 milliGRAM(s) Oral at bedtime  Nephro-elicia 1 Tablet(s) Oral daily  pantoprazole  Injectable 40 milliGRAM(s) IV Push daily  sodium chloride 0.65% Nasal 1 Spray(s) Both Nostrils every 12 hours  sodium chloride 0.9%. 1000 milliLiter(s) (10 mL/Hr) IV Continuous <Continuous>  sodium zirconium cyclosilicate 10 Gram(s) Oral <User Schedule>  traZODone 50 milliGRAM(s) Oral at bedtime      LABS:  02-21    137  |  96  |  27[H]  ----------------------------<  190[H]  4.8   |  29  |  3.85[H]    Ca    8.8      21 Feb 2025 22:02  Phos  3.3     02-21  Mg     2.4     02-21    TPro  6.6  /  Alb  2.7[L]  /  TBili  0.4  /  DBili      /  AST  23  /  ALT  10  /  AlkPhos  101  02-21    Creatinine Trend: 3.85 <--, 5.45 <--, 4.63 <--, 6.07 <--, 5.01 <--, 4.05 <--, 2.93 <--    Albumin: 2.7 g/dL (02-21 @ 22:02)  Phosphorus: 3.3 mg/dL (02-21 @ 22:02)                           8.3    9.95  )-----------( 281      ( 21 Feb 2025 22:02 )             26.4     Urine Studies:  Urinalysis - [02-21-25 @ 22:02]      Color  / Appearance  / SG  / pH       Gluc 190 / Ketone   / Bili  / Urobili        Blood  / Protein  / Leuk Est  / Nitrite       RBC  / WBC  / Hyaline  / Gran  / Sq Epi  / Non Sq Epi  / Bacteria       Iron 29, TIBC 143, %sat 20      [12-19-24 @ 05:00]  Ferritin 904      [12-19-24 @ 05:00]  TSH 4.75      [12-24-24 @ 06:50]

## 2025-02-22 NOTE — PROGRESS NOTE ADULT - ASSESSMENT
54M with PMH of HTN, HLD, ESRD on HD MWF via LUE AVF, ascites (requiring repeat paracenteses q4-6wks), NICM with moderate LV dysfunction w/ EF 35-40%() and CAD s/p atherectomy + SALLIE to D1(2024) presents to [facility] transferred from [facility]. Patient initially presented to [facility] ED with shortness of breath. Of note he was recently admitted from - for acute cholecystitis s/p cholecystectomy. In [facility] ED, pt was hypoxic to 86% on RA, trop 1.7K, EKG no new changes, CXR fluid overload. Admitted for AHRF 2/2 fluid overload. Pt received HD on , ,  and . DAPT was resumed, TTE showed improvement in LVEF to 40-45%. Stress test was abnormal with 1mm inferior STD, reversible ischemia in the inferior wall and fixed defects in the lateral, anterior and apical walls with post stress EF of 24%.     Pt was then transferred to [facility] for cardiac cath which showed pLAD 70% ISR, mLCx 70%, D1 severe dz. Patient now transferred to [facility] CTS Dr. Rivers for CABG eval.# CAD s/p NSTEMI  Hx CAD s/p PCI LAD   Presented with ADHF elevated troponins  Positive NST1-Cath- pLAD 70% ISR, mLCx 70%, D1 severe dz.   TTE EF 35% Severe TRWas on Plavix-p2y12- 321 been off Plavix since -POD#1-C3L free LIMA-LAD; SVG-Diag; DTV-tvdzJQ-Rtikixlat on  Sinus rhythm,Amio for AF prophylaxis  -Awake noted AF RVR no further bradyarrhythmias-Started BBlockers  02/10-Persistent AF tolerating Amio BBlockers Pericardial Effusion decreased consider resuming AC  -Seen by Thoracic-? Diaphragmatic plication,AF on HD  -Sniff test +  02/15-Awake AF,   -Tolerating TC-remains in AF/AFl is ambulating  -On HFTC ambulating AFl ~~80;s on heparin gtt  -s/p Bronch AFl ~~90's on Vent HS TC daytime     Atrial Flutter on TC durong day Ambulating  -Increased congestion on CXR had HD yesterday remains in AFl       # Acute on Chronic Systolic Heart Failure now improved  EF-35% ,severe TR  Had HD post op  GDMT post op  LVEF improved post bypass   -TTE EF 40%,trace effusion  ECG c/w pericarditis-was on colchicine   01/15-TTE EF 55%  -TTE EF 60%   -Normal LV systolic function Moderate pericardial effusion  -On TC-HF and Vent support at nights   02/10-Vent HS with T Collar trial Ambulated Remains in AF  Repeat TTE Normal LV Systolic function Small Pericardial effusion decreased from 2/3        Vascular evaluated  AVGraft /?steal causing RV failure-'' Very low suspicion of steal Sd and AVF causing current clinical state. ''   VA Duplex Hemodialysis Access, Left (25 @ 13:35) >   Arterial flow volume of 1301 mL/m, and the venous flow volume of  1492 mL/m are consistent with a normally functioning dialysis access  fistula.      # Ascites  Hx chronic ascites  Has drainage q4-6 weeks  Last drained -4600mL  ? ascites related to severe TR  Had pcqgegoposyv-Smkklsk-iu growth   CT Abdomen 01/10-Large volume ascites-  US abdomen--Small to moderate volume abdominal/pelvic ascites  US abdomen  Moderate ascites    # ESRD  Now on  HD-MWF

## 2025-02-22 NOTE — PROGRESS NOTE ADULT - SUBJECTIVE AND OBJECTIVE BOX
Patient seen and examined at the bedside.    Remains critically ill on continuous ICU monitoring.      Brief Summary:  55 yo M with multiple medical problems including CAD s/p PCI in 2024 s/p CABG x 3 on 24: Intubated for hypoxia & left lung white out s/p awake bronch with removal of copious mucopurulent secretions  : s/p IABP insertion, sepsis/septic shock due to E coli   ESBL pneumonia, collapsed Left Lung, s/p multiple bronch    tracheostomy tube placement    VRE E. Faecium Bcx   +HSV PCR BAL   tissue cx from back abscess - Serratia   - - intermittent bradycardia/junctional episodes with hypotension  2/3: off , off theophylline. iHD 1L UF  : bronch for Left lung collapse wound vac, 2decho w/ moderate pericardial effusion - similar as before, US abd: small - moderate ascites - monitor at this time.  Wound vac placed for sacral wounds  : iHD-1L UF  : bronch today off positive pressure. : MRSA and serratia from cyst on the back. Plan for Levaquin + Vanc x 5 days   : concern for left food drop   2/10 : no acute issues - CXR shows improving left sided aeration, pt subjectively reports having difficulty while lying flat  : s/p Paracentesis 3.5 L removed, leukocytosis   : Ascites fluid PMN < 250 (less likely SBP)   sniff test yesterday showed paradoxical diaphragmatic motion    : mucus plugging but able to clear airway with aggressive pulmonary toileting.   : no vent requirement overnight, able to mobilize secretion with pulm toileting  hyperkalemia post HD - workup for possible recirculation underway   : On TC X 48 hours - ambulating well, no mucus plugging events    24 Hour events:      Objective:  Vital Signs Last 24 Hrs  T(C): 37 (2025 08:00), Max: 37.2 (2025 00:00)  T(F): 98.6 (2025 08:00), Max: 99 (2025 04:00)  HR: 61 (2025 08:00) (59 - 83)  BP: 133/51 (2025 08:00) (106/42 - 189/91)  BP(mean): 73 (2025 08:00) (61 - 130)  RR: 16 (2025 08:00) (12 - 28)  SpO2: 96% (2025 08:00) (86% - 100%)    Parameters below as of 2025 08:00  Patient On (Oxygen Delivery Method): tracheostomy collar, HF  O2 Flow (L/min): 50  O2 Concentration (%): 50    Mode: CPAP with PS  FiO2: 50  PEEP: 8  PS: 8  MAP: 10  PIP: 16              Physical Exam:   General: Alert and interactive  Neurology: Oriented, following commands  Respiratory: Clear bilaterally, Trach site ok.  CV: AFib  Abdominal: Soft, Nontender  Extremities: Warm, well-perfused  -------------------------------------------------------------------------------------------------------------------------------  Labs:                        8.3    9.95  )-----------( 281      ( 2025 22:02 )             26.4     02-21    137  |  96  |  27[H]  ----------------------------<  190[H]  4.8   |  29  |  3.85[H]    Ca    8.8      2025 22:02  Phos  3.3     02-21  Mg     2.4     02-21    TPro  6.6  /  Alb  2.7[L]  /  TBili  0.4  /  DBili  x   /  AST  23  /  ALT  10  /  AlkPhos  101  02-21    LIVER FUNCTIONS - ( 2025 22:02 )  Alb: 2.7 g/dL / Pro: 6.6 g/dL / ALK PHOS: 101 U/L / ALT: 10 U/L / AST: 23 U/L / GGT: x           PT/INR - ( 2025 22:02 )   PT: 13.8 sec;   INR: 1.20 ratio         PTT - ( 2025 06:16 )  PTT:57.3 sec    ------------------------------------------------------------------------------------------------------------------------------  Assessment:  55 yo M s/p CABG x 3 on 24    Postop acute respiratory failure  Acute blood loss anemia  ESRD on iHD   hx of ESBL   hx of VRE  Serratia infection  MSRA infection   sacral decub     Plan:   ***Neuro***  Postoperative acute pain control with Robaxin and Tylenol  Trazodone, Remeron, and Melatonin nightly  PT ongoing    ***Cardiovascular***  s/p CABG x 3  A flutter - Tolerating beta blocker, PO Amio for rate control  ASA / Statin daily  TTE  ECHO : 1. Left ventricular systolic function is normal.   2. Enlarged right ventricular cavity size and mildly reduced right ventricular systolic function.   3. Left pleural effusion noted.   4. Trace pericardial effusion.    ***Pulmonary***  Postoperative acute respiratory insufficiency - HFTC   Tracheostomy    Mucomyst, normal saline nebs, Xopenex to help mobilize secretions  Left lung PNA, + e coli ESBL - cleared but now with VRE   Trach collar trials ongoing - HFTC at night, TC during the day   Being evaluated for possible left diaphragm plication  - since he is clinically improving will continue current conservative care for now  - thoracic team following  Needs aggressive pulm toileting   Bronchoscopy  for recurrent AUGUSTO opacification     ***GI***  Tolerating Tube feeds at goal  Protonix for stress ulcer prophylaxis.     ***Renal***  ESRD - tolerating iHD  Nephro-Lisette for renal support    ***ID***  No active infection - off antibiotics   Monitor cultures   BAL - commensal manjit    VRE BAL - Linezolid    Acyclovir for + HSV in BAL   Serratia on tissue cx and h/o ESBL PNA   : MRSA and serratia from cyst on the back. Levaquin + Vanco, completed course  Meropenem  -  (leukocytosis - empiric)    BAL commensal manjit     ***Endocrine***  Hyperglycemia - ISS    ***Hematology***  Acute blood loss anemia - no transfusion indication currently.  CBC, coags  Continue Epogen.  Heparin infusion (AF)     Wound:  s/p sacral debridement on  . wound vac placed on  and changed three times a week    *** Lines ***  RUE Midline     Care plan discussed with the ICU care team.   Patient remains critical, at risk for life threatening decompensation.    I have spent 30 minutes providing critical care management to this patient.    By signing my name below, I, Gillian Taylor, attest that this documentation has been prepared under the direction and in the presence of Blaze Finch MD.  Electronically signed: Gillian Taylor, 25 @ 09:14    I, Blaze Finch MD, personally performed the services described in this documentation. all medical record entries made by the scribe were at my direction and in my presence. I have reviewed the chart and agree that the record reflects my personal performance and is accurate and complete  Electronically signed: Blaze Finch MD. Patient seen and examined at the bedside.    Remains critically ill on continuous ICU monitoring.      Brief Summary:  53 yo M with multiple medical problems including CAD s/p PCI in 2024 s/p CABG x 3 on 24: Intubated for hypoxia & left lung white out s/p awake bronch with removal of copious mucopurulent secretions  : s/p IABP insertion, sepsis/septic shock due to E coli   ESBL pneumonia, collapsed Left Lung, s/p multiple bronch    tracheostomy tube placement    VRE E. Faecium Bcx   +HSV PCR BAL   tissue cx from back abscess - Serratia   - - intermittent bradycardia/junctional episodes with hypotension  2/3: off , off theophylline. iHD 1L UF  : bronch for Left lung collapse wound vac, 2decho w/ moderate pericardial effusion - similar as before, US abd: small - moderate ascites - monitor at this time.  Wound vac placed for sacral wounds  : iHD-1L UF  : bronch today off positive pressure. : MRSA and serratia from cyst on the back. Plan for Levaquin + Vanc x 5 days   : concern for left food drop   2/10 : no acute issues - CXR shows improving left sided aeration, pt subjectively reports having difficulty while lying flat  : s/p Paracentesis 3.5 L removed, leukocytosis   : Ascites fluid PMN < 250 (less likely SBP)   sniff test yesterday showed paradoxical diaphragmatic motion    : mucus plugging but able to clear airway with aggressive pulmonary toileting.   : no vent requirement overnight, able to mobilize secretion with pulm toileting  hyperkalemia post HD - workup for possible recirculation underway   : On TC X 48 hours - ambulating well, no mucus plugging events    24 Hour events:  no acute events overnight  tolerated iHD yesterday for -1.5L  new midline placed overnight     Objective:  Vital Signs Last 24 Hrs  T(C): 37 (2025 08:00), Max: 37.2 (2025 00:00)  T(F): 98.6 (2025 08:00), Max: 99 (2025 04:00)  HR: 61 (2025 08:00) (59 - 83)  BP: 133/51 (2025 08:00) (106/42 - 189/91)  BP(mean): 73 (2025 08:00) (61 - 130)  RR: 16 (2025 08:00) (12 - 28)  SpO2: 96% (2025 08:00) (86% - 100%)    Parameters below as of 2025 08:00  Patient On (Oxygen Delivery Method): tracheostomy collar, HF  O2 Flow (L/min): 50  O2 Concentration (%): 50    Mode: CPAP with PS  FiO2: 50  PEEP: 8  PS: 8  MAP: 10  PIP: 16              Physical Exam:   General: Alert and interactive  Neurology: Oriented, following commands  Respiratory: Clear bilaterally, Trach site ok.  CV: AFib  Abdominal: Soft, Nontender  Extremities: Warm, well-perfused  -------------------------------------------------------------------------------------------------------------------------------  Labs:                        8.3    9.95  )-----------( 281      ( 2025 22:02 )             26.4     02-    137  |  96  |  27[H]  ----------------------------<  190[H]  4.8   |  29  |  3.85[H]    Ca    8.8      2025 22:02  Phos  3.3     02-  Mg     2.4     -    TPro  6.6  /  Alb  2.7[L]  /  TBili  0.4  /  DBili  x   /  AST  23  /  ALT  10  /  AlkPhos  101  02-21    LIVER FUNCTIONS - ( 2025 22:02 )  Alb: 2.7 g/dL / Pro: 6.6 g/dL / ALK PHOS: 101 U/L / ALT: 10 U/L / AST: 23 U/L / GGT: x           PT/INR - ( 2025 22:02 )   PT: 13.8 sec;   INR: 1.20 ratio         PTT - ( 2025 06:16 )  PTT:57.3 sec    ------------------------------------------------------------------------------------------------------------------------------  Assessment:  53 yo M s/p CABG x 3 on 24    Postop acute respiratory failure  Acute blood loss anemia  ESRD on iHD   hx of ESBL   hx of VRE  Serratia infection  MSRA infection   sacral decub     Plan:   ***Neuro***  Postoperative acute pain control with Robaxin and Tylenol  Trazodone, Remeron, and Melatonin nightly  PT ongoing    ***Cardiovascular***  s/p CABG x 3  A flutter - Tolerating beta blocker, PO Amio for rate control  ASA / Statin daily  TTE  ECHO : 1. Left ventricular systolic function is normal.   2. Enlarged right ventricular cavity size and mildly reduced right ventricular systolic function.   3. Left pleural effusion noted.   4. Trace pericardial effusion.    ***Pulmonary***  Postoperative acute respiratory insufficiency - HFTC   Tracheostomy    Mucomyst, normal saline nebs, Xopenex to help mobilize secretions  Left lung PNA, + e coli ESBL - cleared but now with VRE   Trach collar trials ongoing - HFTC at night, TC during the day   Being evaluated for possible left diaphragm plication  - since he is clinically improving will continue current conservative care for now  - thoracic team following  Needs aggressive pulm toileting   Bronchoscopy  for recurrent AUGUSTO opacification     ***GI***  Tolerating Tube feeds at goal  Protonix for stress ulcer prophylaxis.     ***Renal***  ESRD - tolerating iHD  Nephro-Lisette for renal support    ***ID***  No active infection - off antibiotics   Monitor cultures   BAL - commensal manjit    VRE BAL - Linezolid    Acyclovir for + HSV in BAL   Serratia on tissue cx and h/o ESBL PNA   : MRSA and serratia from cyst on the back. Levaquin + Vanco, completed course  Meropenem  -  (leukocytosis - empiric)    BAL commensal manjit     ***Endocrine***  Hyperglycemia - ISS    ***Hematology***  Acute blood loss anemia - no transfusion indication currently.  CBC, coags  Continue Epogen.  Heparin infusion (AF)     Wound:  s/p sacral debridement on  . wound vac placed on  and changed three times a week    *** Lines ***  RUE Midline     Care plan discussed with the ICU care team.   Patient remains critical, at risk for life threatening decompensation.    I have spent 35 minutes providing critical care management to this patient.    By signing my name below, I, Gillian Taylor, attest that this documentation has been prepared under the direction and in the presence of Blaze Finch MD.  Electronically signed: Gillian Taylor, 25 @ 09:14    I, Blaze Finch MD, personally performed the services described in this documentation. all medical record entries made by the scribe were at my direction and in my presence. I have reviewed the chart and agree that the record reflects my personal performance and is accurate and complete  Electronically signed: Blaze Finch MD.

## 2025-02-22 NOTE — PROGRESS NOTE ADULT - SUBJECTIVE AND OBJECTIVE BOX
MR#[id number]  PATIENT NAME:RAAD CANO    DATE OF SERVICE: 02-22-25 @ 05:55  Patient was seen and examined by Solo Quick MD on    02-22-25 @ 05:55 .  Interim events noted.Consultant notes ,Labs,Telemetry reviewed by me       HOSPITAL COURSE: HPI:  54M with PMH of HTN, HLD, ESRD on HD MWF via LUE AVF, ascites (requiring repeat paracenteses q4-6wks), NICM with moderate LV dysfunction w/ EF 35-40%(2023) and CAD s/p atherectomy + SALLIE to D1(4/11/2024) presents to [facility] transferred from [facility]. Patient initially presented to [facility] ED with shortness of breath. Of note he was recently admitted from 09/27-12/02 for acute cholecystitis s/p cholecystectomy. In [facility] ED, pt was hypoxic to 86% on RA, trop 1.7K, EKG no new changes, CXR fluid overload. Admitted for AHRF 2/2 fluid overload. Pt received HD on 12/18, 12/19, 12/20 and 12/22. DAPT was resumed, TTE showed improvement in LVEF to 40-45%. Stress test was abnormal with 1mm inferior STD, reversible ischemia in the inferior wall and fixed defects in the lateral, anterior and apical walls with post stress EF of 24%. Pt was then transferred to [facility] for cardiac cath which showed pLAD 70% ISR, mLCx 70%, D1 severe dz. Patient now transferred to [facility] CTS Dr. Rivers for CABG eval. Patient denies any current SOB or chest pain on admission. Otherwise denies headaches, abdominal pain, urinary or bowel changes, fevers, chills, N/V/D, sick contacts.  (24 Dec 2024 05:07)      INTERIM EVENTS:Patient seen at bedside ,interim events noted.  12/23 Cardiac cath  pLAD 70% ISR, mLCx 70%, D1 severe dz.   12/31-POD#1-C3L free LIMA-LAD; SVG-Diag; SVG-leftPL Awake OOB extubated Sinus rhythm on Dobutamine gtt  02/19 Had Bronch yesterday on vent HS remains in Atrial Flutter  02/21-Awake on HFTC at night Ambulating-Atrial Flutter  02/22-Had Midline Remains in Atrial Flutter on TC        MEDICATIONS  (STANDING):  aMIOdarone    Tablet 200 milliGRAM(s) Oral daily  ascorbic acid 500 milliGRAM(s) Oral daily  aspirin  chewable 81 milliGRAM(s) Oral daily  atorvastatin 80 milliGRAM(s) Oral at bedtime  chlorhexidine 0.12% Liquid 15 milliLiter(s) Oral Mucosa every 12 hours  chlorhexidine 2% Cloths 1 Application(s) Topical daily  chlorhexidine 4% Liquid 1 Application(s) Topical <User Schedule>  dextrose 50% Injectable 50 milliLiter(s) IV Push every 15 minutes  epoetin ignacia (EPOGEN) Injectable 23415 Unit(s) IV Push <User Schedule>  ferrous    sulfate Liquid 300 milliGRAM(s) Enteral Tube daily  heparin  Infusion 500 Unit(s)/Hr (13 mL/Hr) IV Continuous <Continuous>  insulin lispro (ADMELOG) corrective regimen sliding scale   SubCutaneous every 6 hours  melatonin 5 milliGRAM(s) Oral at bedtime  methocarbamol 500 milliGRAM(s) Oral every 8 hours  metoprolol tartrate 25 milliGRAM(s) Oral two times a day  mirtazapine 7.5 milliGRAM(s) Oral at bedtime  Nephro-elicia 1 Tablet(s) Oral daily  pantoprazole  Injectable 40 milliGRAM(s) IV Push daily  sodium chloride 0.65% Nasal 1 Spray(s) Both Nostrils every 12 hours  sodium chloride 0.9%. 1000 milliLiter(s) (10 mL/Hr) IV Continuous <Continuous>  sodium zirconium cyclosilicate 10 Gram(s) Oral <User Schedule>  traZODone 50 milliGRAM(s) Oral at bedtime    MEDICATIONS  (PRN):  acetaminophen     Tablet .. 650 milliGRAM(s) Oral every 6 hours PRN Mild Pain (1 - 3)  lidocaine/prilocaine Cream 1 Application(s) Topical daily PRN pre-HD  sodium chloride 0.9% lock flush 10 milliLiter(s) IV Push every 1 hour PRN Pre/post blood products, medications, blood draw, and to maintain line patency            REVIEW OF SYSTEMS:  Constitutional: [ ] fever, [ ]weight loss,  [ ]fatigue [ ]weight gain  Eyes: [ ] visual changes  Respiratory: [ ]shortness of breath;  [ ] cough, [ ]wheezing, [ ]chills, [ ]hemoptysis  Cardiovascular: [ ] chest pain, [ ]palpitations, [ ]dizziness,  [ ]leg swelling[ ]orthopnea[ ]PND  Gastrointestinal: [ ] abdominal pain, [ ]nausea, [ ]vomiting,  [ ]diarrhea [ ]Constipation [ ]Melena  Genitourinary: [ ] dysuria, [ ] hematuria [ ]Vigil  Neurologic: [ ] headaches [ ] tremors[ ]weakness [ ]Paralysis Right[ ] Left[ ]  Skin: [ ] itching, [ ]burning, [ ] rashes  Endocrine: [ ] heat or cold intolerance  Musculoskeletal: [ ] joint pain or swelling; [ ] muscle, back, or extremity pain  Psychiatric: [ ] depression, [ ]anxiety, [ ]mood swings, or [ ]difficulty sleeping  Hematologic: [ ] easy bruising, [ ] bleeding gums    [ ] All remaining systems negative except as per above.   [ ]Unable to obtain.  [x] No change in ROS since admission      Vital Signs Last 24 Hrs  T(C): 37.2 (22 Feb 2025 04:00), Max: 37.2 (22 Feb 2025 00:00)  T(F): 99 (22 Feb 2025 04:00), Max: 99 (22 Feb 2025 04:00)  HR: 61 (22 Feb 2025 05:00) (59 - 83)  BP: 134/62 (22 Feb 2025 05:00) (106/42 - 189/91)  BP(mean): 89 (22 Feb 2025 05:00) (61 - 130)  RR: 13 (22 Feb 2025 05:00) (12 - 28)  SpO2: 98% (22 Feb 2025 05:00) (86% - 100%)    Parameters below as of 22 Feb 2025 04:00  Patient On (Oxygen Delivery Method): BiPAP/CPAP      I&O's Summary    20 Feb 2025 07:01  -  21 Feb 2025 07:00  --------------------------------------------------------  IN: 2441 mL / OUT: 0 mL / NET: 2441 mL    21 Feb 2025 07:01  -  22 Feb 2025 05:55  --------------------------------------------------------  IN: 1953 mL / OUT: 1501 mL / NET: 452 mL        PHYSICAL EXAM:  General: No acute distress BMI-  HEENT: EOMI, PERRL  Neck: Supple, [ ] JVD  Lungs: Equal air entry bilaterally; [ ] rales [ ] wheezing [ ] rhonchi  Heart: Regular rate and rhythm; [x ] murmur   2/6 [ x] systolic [ ] diastolic [ ] radiation[ ] rubs [ ]  gallops  Abdomen: Nontender, bowel sounds present  Extremities: No clubbing, cyanosis, [ ] edema [ ]Pulses  equal and intact  Nervous system:  Alert & Oriented X3, no focal deficits  Psychiatric: Normal affect  Skin: No rashes or lesions    LABS:  02-21    137  |  96  |  27[H]  ----------------------------<  190[H]  4.8   |  29  |  3.85[H]    Ca    8.8      21 Feb 2025 22:02  Phos  3.3     02-21  Mg     2.4     02-21    TPro  6.6  /  Alb  2.7[L]  /  TBili  0.4  /  DBili  x   /  AST  23  /  ALT  10  /  AlkPhos  101  02-21    Creatinine Trend: 3.85<--, 5.45<--, 4.63<--, 6.07<--, 5.01<--, 4.05<--                        8.3    9.95  )-----------( 281      ( 21 Feb 2025 22:02 )             26.4     PT/INR - ( 21 Feb 2025 22:02 )   PT: 13.8 sec;   INR: 1.20 ratio         PTT - ( 22 Feb 2025 05:03 )  PTT:74.7 sec          Xray Chest 1 View- PORTABLE-Routine (Xray Chest 1 View- PORTABLE-Routine in AM.) (02.21.25 @ 07:39) >  COMPARISON STUDY: 2/18/2025    Frontal expiratory view of the chest shows the heart to be similar in  size. Tracheostomy tube and feeding tube remain present.    The lungs show progression of pulmonary congestion with progression of  pleural effusions, left more than right and there is no evidence of  pneumothorax.    Chest one view 2/20/2025 6:12 AM  Compared to the prior study, there is less pulmonary congestion and  smaller pleural effusions.    Chest one view 2/21/2025 6:48 AM  Compared to the prior study, pulmonary congestion and left pleural  effusion have increased.    IMPRESSION:  Congestive changes as noted.       TTE Limited W or WO Ultrasound Enhancing Agent (02.18.25 @ 13:54) >  CONCLUSIONS:      1. Left ventricular systolic function is normal.   2. Enlarged right ventricular cavity size and mildly reduced right ventricular systolic function.   3. Left pleural effusion noted.   4. Trace pericardial effusion.       US Abdomen Upper Quadrant Right (02.18.25 @ 14:04) >  IMPRESSION:  *  Moderate volume ascites.  *  Small right pleural effusion.       Xray Chest 1 View- PORTABLE-Urgent (Xray Chest 1 View- PORTABLE-Urgent .) (02.18.25 @ 13:44) >  IMPRESSION:  Mildly improved aeration of the left upper lung field, with near complete  opacification of the left lung likely representing a large pleural effusion and/or atelectasis.

## 2025-02-23 LAB
ALBUMIN SERPL ELPH-MCNC: 2.7 G/DL — LOW (ref 3.3–5)
ALP SERPL-CCNC: 94 U/L — SIGNIFICANT CHANGE UP (ref 40–120)
ALT FLD-CCNC: 9 U/L — LOW (ref 10–45)
ANION GAP SERPL CALC-SCNC: 13 MMOL/L — SIGNIFICANT CHANGE UP (ref 5–17)
APTT BLD: 38.7 SEC — HIGH (ref 24.5–35.6)
APTT BLD: 38.8 SEC — HIGH (ref 24.5–35.6)
APTT BLD: 41.2 SEC — HIGH (ref 24.5–35.6)
APTT BLD: 45.4 SEC — HIGH (ref 24.5–35.6)
APTT BLD: 47.3 SEC — HIGH (ref 24.5–35.6)
AST SERPL-CCNC: 14 U/L — SIGNIFICANT CHANGE UP (ref 10–40)
BASOPHILS # BLD AUTO: 0.04 K/UL — SIGNIFICANT CHANGE UP (ref 0–0.2)
BASOPHILS NFR BLD AUTO: 0.4 % — SIGNIFICANT CHANGE UP (ref 0–2)
BILIRUB SERPL-MCNC: 0.3 MG/DL — SIGNIFICANT CHANGE UP (ref 0.2–1.2)
BUN SERPL-MCNC: 40 MG/DL — HIGH (ref 7–23)
CHLORIDE SERPL-SCNC: 95 MMOL/L — LOW (ref 96–108)
CO2 SERPL-SCNC: 28 MMOL/L — SIGNIFICANT CHANGE UP (ref 22–31)
CREAT SERPL-MCNC: 4.86 MG/DL — HIGH (ref 0.5–1.3)
EGFR: 13 ML/MIN/1.73M2 — LOW
EGFR: 13 ML/MIN/1.73M2 — LOW
EOSINOPHIL # BLD AUTO: 0.91 K/UL — HIGH (ref 0–0.5)
EOSINOPHIL NFR BLD AUTO: 9.5 % — HIGH (ref 0–6)
HCT VFR BLD CALC: 24.5 % — LOW (ref 39–50)
HGB BLD-MCNC: 7.7 G/DL — LOW (ref 13–17)
IMM GRANULOCYTES NFR BLD AUTO: 0.9 % — SIGNIFICANT CHANGE UP (ref 0–0.9)
INR BLD: 1.22 RATIO — HIGH (ref 0.85–1.16)
INR BLD: 1.22 RATIO — HIGH (ref 0.85–1.16)
LYMPHOCYTES # BLD AUTO: 0.82 K/UL — LOW (ref 1–3.3)
LYMPHOCYTES # BLD AUTO: 8.6 % — LOW (ref 13–44)
MAGNESIUM SERPL-MCNC: 2.6 MG/DL — SIGNIFICANT CHANGE UP (ref 1.6–2.6)
MCHC RBC-ENTMCNC: 31.3 PG — SIGNIFICANT CHANGE UP (ref 27–34)
MCHC RBC-ENTMCNC: 31.4 G/DL — LOW (ref 32–36)
MCV RBC AUTO: 99.6 FL — SIGNIFICANT CHANGE UP (ref 80–100)
MONOCYTES # BLD AUTO: 0.87 K/UL — SIGNIFICANT CHANGE UP (ref 0–0.9)
MONOCYTES NFR BLD AUTO: 9.1 % — SIGNIFICANT CHANGE UP (ref 2–14)
NEUTROPHILS # BLD AUTO: 6.82 K/UL — SIGNIFICANT CHANGE UP (ref 1.8–7.4)
NEUTROPHILS NFR BLD AUTO: 71.5 % — SIGNIFICANT CHANGE UP (ref 43–77)
NRBC BLD AUTO-RTO: 0 /100 WBCS — SIGNIFICANT CHANGE UP (ref 0–0)
PHOSPHATE SERPL-MCNC: 3.7 MG/DL — SIGNIFICANT CHANGE UP (ref 2.5–4.5)
PLATELET # BLD AUTO: 271 K/UL — SIGNIFICANT CHANGE UP (ref 150–400)
POTASSIUM SERPL-MCNC: 4.7 MMOL/L — SIGNIFICANT CHANGE UP (ref 3.5–5.3)
POTASSIUM SERPL-SCNC: 4.7 MMOL/L — SIGNIFICANT CHANGE UP (ref 3.5–5.3)
PROT SERPL-MCNC: 6.3 G/DL — SIGNIFICANT CHANGE UP (ref 6–8.3)
PROTHROM AB SERPL-ACNC: 13.9 SEC — HIGH (ref 9.9–13.4)
PROTHROM AB SERPL-ACNC: 13.9 SEC — HIGH (ref 9.9–13.4)
RBC # BLD: 2.46 M/UL — LOW (ref 4.2–5.8)
RBC # FLD: 22.1 % — HIGH (ref 10.3–14.5)
SODIUM SERPL-SCNC: 136 MMOL/L — SIGNIFICANT CHANGE UP (ref 135–145)
WBC # BLD: 9.55 K/UL — SIGNIFICANT CHANGE UP (ref 3.8–10.5)
WBC # FLD AUTO: 9.55 K/UL — SIGNIFICANT CHANGE UP (ref 3.8–10.5)

## 2025-02-23 PROCEDURE — 99291 CRITICAL CARE FIRST HOUR: CPT

## 2025-02-23 PROCEDURE — 71045 X-RAY EXAM CHEST 1 VIEW: CPT | Mod: 26,77

## 2025-02-23 PROCEDURE — 71045 X-RAY EXAM CHEST 1 VIEW: CPT | Mod: 26

## 2025-02-23 RX ORDER — MAGNESIUM SULFATE 500 MG/ML
1 SYRINGE (ML) INJECTION ONCE
Refills: 0 | Status: DISCONTINUED | OUTPATIENT
Start: 2025-02-23 | End: 2025-02-23

## 2025-02-23 RX ORDER — ACETAMINOPHEN 500 MG/5ML
1000 LIQUID (ML) ORAL ONCE
Refills: 0 | Status: COMPLETED | OUTPATIENT
Start: 2025-02-23 | End: 2025-02-23

## 2025-02-23 RX ORDER — GABAPENTIN 400 MG/1
100 CAPSULE ORAL ONCE
Refills: 0 | Status: COMPLETED | OUTPATIENT
Start: 2025-02-23 | End: 2025-02-23

## 2025-02-23 RX ADMIN — METHOCARBAMOL 500 MILLIGRAM(S): 500 TABLET, FILM COATED ORAL at 10:13

## 2025-02-23 RX ADMIN — INSULIN LISPRO 2: 100 INJECTION, SOLUTION INTRAVENOUS; SUBCUTANEOUS at 05:25

## 2025-02-23 RX ADMIN — Medication 50 MILLIGRAM(S): at 23:09

## 2025-02-23 RX ADMIN — ATORVASTATIN CALCIUM 80 MILLIGRAM(S): 80 TABLET, FILM COATED ORAL at 23:10

## 2025-02-23 RX ADMIN — AMIODARONE HYDROCHLORIDE 200 MILLIGRAM(S): 50 INJECTION, SOLUTION INTRAVENOUS at 05:19

## 2025-02-23 RX ADMIN — Medication 400 MILLIGRAM(S): at 11:38

## 2025-02-23 RX ADMIN — Medication 15 MILLILITER(S): at 05:19

## 2025-02-23 RX ADMIN — Medication 1 SPRAY(S): at 18:13

## 2025-02-23 RX ADMIN — METOPROLOL SUCCINATE 25 MILLIGRAM(S): 50 TABLET, EXTENDED RELEASE ORAL at 05:19

## 2025-02-23 RX ADMIN — Medication 500 MILLIGRAM(S): at 11:38

## 2025-02-23 RX ADMIN — Medication 1 SPRAY(S): at 05:20

## 2025-02-23 RX ADMIN — Medication 5 MILLIGRAM(S): at 23:09

## 2025-02-23 RX ADMIN — GABAPENTIN 100 MILLIGRAM(S): 400 CAPSULE ORAL at 18:41

## 2025-02-23 RX ADMIN — HEPARIN SODIUM 14 UNIT(S)/HR: 1000 INJECTION INTRAVENOUS; SUBCUTANEOUS at 05:19

## 2025-02-23 RX ADMIN — MIRTAZAPINE 7.5 MILLIGRAM(S): 30 TABLET, FILM COATED ORAL at 23:10

## 2025-02-23 RX ADMIN — Medication 81 MILLIGRAM(S): at 11:37

## 2025-02-23 RX ADMIN — Medication 1 TABLET(S): at 11:38

## 2025-02-23 RX ADMIN — METHOCARBAMOL 500 MILLIGRAM(S): 500 TABLET, FILM COATED ORAL at 18:12

## 2025-02-23 RX ADMIN — Medication 40 MILLIGRAM(S): at 11:38

## 2025-02-23 RX ADMIN — METOPROLOL SUCCINATE 25 MILLIGRAM(S): 50 TABLET, EXTENDED RELEASE ORAL at 18:12

## 2025-02-23 RX ADMIN — Medication 300 MILLIGRAM(S): at 11:38

## 2025-02-23 RX ADMIN — Medication 1 APPLICATION(S): at 21:29

## 2025-02-23 RX ADMIN — Medication 15 MILLILITER(S): at 18:12

## 2025-02-23 RX ADMIN — Medication 1000 MILLIGRAM(S): at 12:00

## 2025-02-23 RX ADMIN — HEPARIN SODIUM 14 UNIT(S)/HR: 1000 INJECTION INTRAVENOUS; SUBCUTANEOUS at 07:32

## 2025-02-23 RX ADMIN — Medication 1 APPLICATION(S): at 05:50

## 2025-02-23 RX ADMIN — METHOCARBAMOL 500 MILLIGRAM(S): 500 TABLET, FILM COATED ORAL at 02:50

## 2025-02-23 NOTE — PROGRESS NOTE ADULT - ASSESSMENT
54M with PMH of HTN, HLD, ESRD on HD MWF via LUE AVF, ascites (requiring repeat paracenteses q4-6wks), NICM with moderate LV dysfunction w/ EF 35-40%() and CAD s/p atherectomy + SALLIE to D1(2024) presents to [facility] transferred from [facility]. Patient initially presented to [facility] ED with shortness of breath. Of note he was recently admitted from - for acute cholecystitis s/p cholecystectomy. In [facility] ED, pt was hypoxic to 86% on RA, trop 1.7K, EKG no new changes, CXR fluid overload. Admitted for AHRF 2/2 fluid overload. Pt received HD on , ,  and . DAPT was resumed, TTE showed improvement in LVEF to 40-45%. Stress test was abnormal with 1mm inferior STD, reversible ischemia in the inferior wall and fixed defects in the lateral, anterior and apical walls with post stress EF of 24%.     Pt was then transferred to [facility] for cardiac cath which showed pLAD 70% ISR, mLCx 70%, D1 severe dz. Patient now transferred to [facility] CTS Dr. Rivers for CABG eval.# CAD s/p NSTEMI  Hx CAD s/p PCI LAD   Presented with ADHF elevated troponins  Positive NST1-Cath- pLAD 70% ISR, mLCx 70%, D1 severe dz.   TTE EF 35% Severe TRWas on Plavix-p2y12- 321 been off Plavix since -POD#1-C3L free LIMA-LAD; SVG-Diag; MVP-flocEX-Txhcjmedo on  Sinus rhythm,Amio for AF prophylaxis  -Awake noted AF RVR no further bradyarrhythmias-Started BBlockers  02/10-Persistent AF tolerating Amio BBlockers Pericardial Effusion decreased consider resuming AC  -Seen by Thoracic-? Diaphragmatic plication,AF on HD  -Sniff test +  02/15-Awake AF,   -Tolerating TC-remains in AF/AFl is ambulating  -On HFTC ambulating AFl ~~80;s on heparin gtt  -s/p Bronch AFl ~~90's on Vent HS TC daytime     Atrial Flutter on TC during day Ambulating  -Increased congestion on CXR had HD yesterday remains in AFl   -Atrial Flutter HFTC 40L/30% BP stable       # Acute on Chronic Systolic Heart Failure now improved  EF-35% ,severe TR  Had HD post op  GDMT post op  LVEF improved post bypass   -TTE EF 40%,trace effusion  ECG c/w pericarditis-was on colchicine   01/15-TTE EF 55%  -TTE EF 60%   -Normal LV systolic function Moderate pericardial effusion  -On TC-HF and Vent support at nights   02/10-Vent HS with T Collar trial Ambulated Remains in AF  Repeat TTE Normal LV Systolic function Small Pericardial effusion decreased from 2/3        Vascular evaluated  AVGraft /?steal causing RV failure-'' Very low suspicion of steal Sd and AVF causing current clinical state. ''   VA Duplex Hemodialysis Access, Left (25 @ 13:35) >   Arterial flow volume of 1301 mL/m, and the venous flow volume of  1492 mL/m are consistent with a normally functioning dialysis access  fistula.      # Ascites  Hx chronic ascites  Has drainage q4-6 weeks  Last drained -4600mL  ? ascites related to severe TR  Had dwfbmunrfnlk-Bhaxkvx-fs growth   CT Abdomen 01/10-Large volume ascites-  US abdomen--Small to moderate volume abdominal/pelvic ascites  US abdomen  Moderate ascites    # ESRD  Now on  HD-MWF

## 2025-02-23 NOTE — PROGRESS NOTE ADULT - SUBJECTIVE AND OBJECTIVE BOX
[facility]   MD DREW VIZCARRA MD ANGELA WONG, PA QIAN CHEN, JAX    TEL:  OFFICE: [phone]  From 5pm-7am Answering Service [phone]    -- RENAL FOLLOW UP NOTE ---Date of Service 02-23-25 @ 17:08    Patient is a 54y old  Male who presents with a chief complaint of CAD s/p CABG.    Patient seen and examined at bedside, in ICU. No chest pain/SOB.    VITALS:  T(F): 99 (02-23-25 @ 16:00), Max: 99 (02-23-25 @ 04:00)  HR: 58 (02-23-25 @ 16:00)  BP: 110/51 (02-23-25 @ 16:00)  RR: 16 (02-23-25 @ 16:00)  SpO2: 100% (02-23-25 @ 16:00)  Wt(kg): --    02-22 @ 07:01  -  02-23 @ 07:00  --------------------------------------------------------  IN: 2297 mL / OUT: 0 mL / NET: 2297 mL    02-23 @ 07:01  -  02-23 @ 17:08  --------------------------------------------------------  IN: 762.5 mL / OUT: 0 mL / NET: 762.5 mL      PHYSICAL EXAM:  General: NAD  Neck: No JVD  Respiratory: Diminished RLL.  Cardiovascular: S1, S2, RRR  Gastrointestinal: BS+, soft, NT/ND  Extremities: No peripheral edema    Hospital Medications:   MEDICATIONS  (STANDING):  aMIOdarone    Tablet 200 milliGRAM(s) Oral daily  ascorbic acid 500 milliGRAM(s) Oral daily  aspirin  chewable 81 milliGRAM(s) Oral daily  atorvastatin 80 milliGRAM(s) Oral at bedtime  chlorhexidine 0.12% Liquid 15 milliLiter(s) Oral Mucosa every 12 hours  chlorhexidine 2% Cloths 1 Application(s) Topical daily  chlorhexidine 4% Liquid 1 Application(s) Topical <User Schedule>  dextrose 50% Injectable 50 milliLiter(s) IV Push every 15 minutes  epoetin ignacia (EPOGEN) Injectable 27637 Unit(s) IV Push <User Schedule>  ferrous    sulfate Liquid 300 milliGRAM(s) Enteral Tube daily  heparin  Infusion 500 Unit(s)/Hr (15.5 mL/Hr) IV Continuous <Continuous>  insulin lispro (ADMELOG) corrective regimen sliding scale   SubCutaneous every 6 hours  melatonin 5 milliGRAM(s) Oral at bedtime  methocarbamol 500 milliGRAM(s) Oral every 8 hours  metoprolol tartrate 25 milliGRAM(s) Oral two times a day  mirtazapine 7.5 milliGRAM(s) Oral at bedtime  Nephro-elicia 1 Tablet(s) Oral daily  pantoprazole  Injectable 40 milliGRAM(s) IV Push daily  sodium chloride 0.65% Nasal 1 Spray(s) Both Nostrils every 12 hours  sodium chloride 0.9%. 1000 milliLiter(s) (10 mL/Hr) IV Continuous <Continuous>  sodium zirconium cyclosilicate 10 Gram(s) Oral <User Schedule>  traZODone 50 milliGRAM(s) Oral at bedtime      LABS:  02-23    136  |  95[L]  |  40[H]  ----------------------------<  162[H]  4.7   |  28  |  4.86[H]    Ca    8.9      23 Feb 2025 00:44  Phos  3.7     02-23  Mg     2.6     02-23    TPro  6.3  /  Alb  2.7[L]  /  TBili  0.3  /  DBili      /  AST  14  /  ALT  9[L]  /  AlkPhos  94  02-23    Creatinine Trend: 4.86 <--, 3.85 <--, 5.45 <--, 4.63 <--, 6.07 <--, 5.01 <--, 4.05 <--    Albumin: 2.7 g/dL (02-23 @ 00:44)  Phosphorus: 3.7 mg/dL (02-23 @ 00:44)                          7.7    9.55  )-----------( 271      ( 23 Feb 2025 00:43 )             24.5     Urine Studies:  Urinalysis - [02-23-25 @ 00:44]      Color  / Appearance  / SG  / pH       Gluc 162 / Ketone   / Bili  / Urobili        Blood  / Protein  / Leuk Est  / Nitrite       RBC  / WBC  / Hyaline  / Gran  / Sq Epi  / Non Sq Epi  / Bacteria       Iron 29, TIBC 143, %sat 20      [12-19-24 @ 05:00]  Ferritin 904      [12-19-24 @ 05:00]  TSH 4.75      [12-24-24 @ 06:50]

## 2025-02-23 NOTE — PROGRESS NOTE ADULT - SUBJECTIVE AND OBJECTIVE BOX
Patient seen and examined at the bedside.    Remains critically ill on continuous ICU monitoring.      Brief Summary:  55 yo M with multiple medical problems including CAD s/p PCI in 2024 s/p CABG x 3 on 24: Intubated for hypoxia & left lung white out s/p awake bronch with removal of copious mucopurulent secretions  : s/p IABP insertion, sepsis/septic shock due to E coli   ESBL pneumonia, collapsed Left Lung, s/p multiple bronch    tracheostomy tube placement    VRE E. Faecium Bcx   +HSV PCR BAL   tissue cx from back abscess - Serratia   - - intermittent bradycardia/junctional episodes with hypotension  2/3: off , off theophylline. iHD 1L UF  : bronch for Left lung collapse wound vac, 2decho w/ moderate pericardial effusion - similar as before, US abd: small - moderate ascites - monitor at this time.  Wound vac placed for sacral wounds  : iHD-1L UF  : bronch today off positive pressure. : MRSA and serratia from cyst on the back. Plan for Levaquin + Vanc x 5 days   : concern for left food drop   2/10 : no acute issues - CXR shows improving left sided aeration, pt subjectively reports having difficulty while lying flat  : s/p Paracentesis 3.5 L removed, leukocytosis   : Ascites fluid PMN < 250 (less likely SBP)   sniff test yesterday showed paradoxical diaphragmatic motion    : mucus plugging but able to clear airway with aggressive pulmonary toileting.   : no vent requirement overnight, able to mobilize secretion with pulm toileting  hyperkalemia post HD - workup for possible recirculation underway   : On TC X 48 hours - ambulating well, no mucus plugging events    24 Hour events:    Objective:  Vital Signs Last 24 Hrs  T(C): 37 (2025 08:00), Max: 37.2 (2025 04:00)  T(F): 98.6 (2025 08:00), Max: 99 (2025 04:00)  HR: 84 (2025 08:00) (59 - 108)  BP: 146/57 (2025 08:00) (105/49 - 146/60)  BP(mean): 82 (2025 08:00) (68 - 95)  RR: 18 (2025 08:00) (11 - 35)  SpO2: 94% (2025 08:00) (81% - 100%)    Parameters below as of 2025 08:00  Patient On (Oxygen Delivery Method): HFTC  O2 Flow (L/min): 40  O2 Concentration (%): 30    Mode: High flow trach collar   FiO2: 30            Physical Exam:   General: Alert and interactive  Neurology: Oriented, following commands  Respiratory: Clear bilaterally, Trach site ok.  CV: AFib  Abdominal: Soft, Nontender  Extremities: Warm, well-perfused  -------------------------------------------------------------------------------------------------------------------------------    Labs:                        7.7    9.55  )-----------( 271      ( 2025 00:43 )             24.5     02-23    136  |  95[L]  |  40[H]  ----------------------------<  162[H]  4.7   |  28  |  4.86[H]    Ca    8.9      2025 00:44  Phos  3.7     02-  Mg     2.6     -    TPro  6.3  /  Alb  2.7[L]  /  TBili  0.3  /  DBili  x   /  AST  14  /  ALT  9[L]  /  AlkPhos  94  -23    LIVER FUNCTIONS - ( 2025 00:44 )  Alb: 2.7 g/dL / Pro: 6.3 g/dL / ALK PHOS: 94 U/L / ALT: 9 U/L / AST: 14 U/L / GGT: x           PT/INR - ( 2025 01:49 )   PT: 13.9 sec;   INR: 1.22 ratio         PTT - ( 2025 01:49 )  PTT:38.8 sec    ------------------------------------------------------------------------------------------------------------------------------  Assessment:  55 yo M s/p CABG x 3 on 24    Postop acute respiratory failure  Acute blood loss anemia  ESRD on iHD   hx of ESBL   hx of VRE  Serratia infection  MSRA infection   sacral decub     Plan:   ***Neuro***  Postoperative acute pain control with Robaxin and Tylenol  Trazodone, Remeron, and Melatonin nightly  PT ongoing    ***Cardiovascular***  s/p CABG x 3  A flutter - Tolerating beta blocker, PO Amio for rate control  ASA / Statin daily  TTE  ECHO : 1. Left ventricular systolic function is normal.   2. Enlarged right ventricular cavity size and mildly reduced right ventricular systolic function.   3. Left pleural effusion noted.   4. Trace pericardial effusion.    ***Pulmonary***  Postoperative acute respiratory insufficiency - HFTC   Tracheostomy    Mucomyst, normal saline nebs, Xopenex to help mobilize secretions  Left lung PNA, + e coli ESBL - cleared but now with VRE   Trach collar trials ongoing - HFTC at night, TC during the day   Being evaluated for possible left diaphragm plication  - since he is clinically improving will continue current conservative care for now  - thoracic team following  Needs aggressive pulm toileting   Bronchoscopy  for recurrent AUGUSTO opacification     ***GI***  Tolerating Tube feeds at goal  Protonix for stress ulcer prophylaxis.     ***Renal***  ESRD - tolerating iHD  Nephro-Lisette for renal support    ***ID***  No active infection - off antibiotics   Monitor cultures   BAL - commensal manjit    VRE BAL - Linezolid    Acyclovir for + HSV in BAL   Serratia on tissue cx and h/o ESBL PNA   : MRSA and serratia from cyst on the back. Levaquin + Vanco, completed course  Meropenem  -  (leukocytosis - empiric)    BAL commensal manjit     ***Endocrine***  Hyperglycemia - ISS    ***Hematology***  Acute blood loss anemia - no transfusion indication currently.  CBC, coags  Continue Epogen.  Heparin infusion (AF)     Wound:  s/p sacral debridement on  . wound vac placed on  and changed three times a week    *** Lines ***   RUE Midline           Care plan discussed with the ICU care team.   Patient remains critical, at risk for life threatening decompensation.    I have spent 30 minutes providing critical care management to this patient.    By signing my name below, I, Angelo Barbosa, attest that this documentation has been prepared under the direction and in the presence of Blaze Finch MD.  Electronically signed: Angelo Barbosa, 25 @ 08:40    I, Blaze Finch MD,  personally performed the services described in this documentation. all medical record entries made by the scribe were at my direction and in my presence. I have reviewed the chart and agree that the record reflects my personal performance and is accurate and complete  Electronically signed: Blaze Finch MD. Patient seen and examined at the bedside.    Remains critically ill on continuous ICU monitoring.      Brief Summary:  53 yo M with multiple medical problems including CAD s/p PCI in 2024 s/p CABG x 3 on 24: Intubated for hypoxia & left lung white out s/p awake bronch with removal of copious mucopurulent secretions  : s/p IABP insertion, sepsis/septic shock due to E coli   ESBL pneumonia, collapsed Left Lung, s/p multiple bronch    tracheostomy tube placement    VRE E. Faecium Bcx   +HSV PCR BAL   tissue cx from back abscess - Serratia   - - intermittent bradycardia/junctional episodes with hypotension  2/3: off , off theophylline. iHD 1L UF  : bronch for Left lung collapse wound vac, 2decho w/ moderate pericardial effusion - similar as before, US abd: small - moderate ascites - monitor at this time.  Wound vac placed for sacral wounds  : iHD-1L UF  : bronch today off positive pressure. : MRSA and serratia from cyst on the back. Plan for Levaquin + Vanc x 5 days   : concern for left food drop   2/10 : no acute issues - CXR shows improving left sided aeration, pt subjectively reports having difficulty while lying flat  : s/p Paracentesis 3.5 L removed, leukocytosis   : Ascites fluid PMN < 250 (less likely SBP)   sniff test yesterday showed paradoxical diaphragmatic motion    : mucus plugging but able to clear airway with aggressive pulmonary toileting.   : no vent requirement overnight, able to mobilize secretion with pulm toileting  hyperkalemia post HD - workup for possible recirculation underway   : On TC X 48 hours - ambulating well, no mucus plugging events    24 Hour events:  no acute events overnight  last iHD session   tolerated TC for > 24 hrs     Objective:  Vital Signs Last 24 Hrs  T(C): 37 (2025 08:00), Max: 37.2 (2025 04:00)  T(F): 98.6 (2025 08:00), Max: 99 (2025 04:00)  HR: 84 (2025 08:00) (59 - 108)  BP: 146/57 (2025 08:00) (105/49 - 146/60)  BP(mean): 82 (2025 08:00) (68 - 95)  RR: 18 (2025 08:00) (11 - 35)  SpO2: 94% (2025 08:00) (81% - 100%)    Parameters below as of 2025 08:00  Patient On (Oxygen Delivery Method): HFTC  O2 Flow (L/min): 40  O2 Concentration (%): 30    Mode: High flow trach collar   FiO2: 30            Physical Exam:   General: Alert and interactive  Neurology: Oriented, following commands  Respiratory: Clear bilaterally, Trach site ok.  CV: AFib  Abdominal: Soft, Nontender  Extremities: Warm, well-perfused  -------------------------------------------------------------------------------------------------------------------------------    Labs:                        7.7    9.55  )-----------( 271      ( 2025 00:43 )             24.5     02-    136  |  95[L]  |  40[H]  ----------------------------<  162[H]  4.7   |  28  |  4.86[H]    Ca    8.9      2025 00:44  Phos  3.7     -  Mg     2.6     -    TPro  6.3  /  Alb  2.7[L]  /  TBili  0.3  /  DBili  x   /  AST  14  /  ALT  9[L]  /  AlkPhos  94      LIVER FUNCTIONS - ( 2025 00:44 )  Alb: 2.7 g/dL / Pro: 6.3 g/dL / ALK PHOS: 94 U/L / ALT: 9 U/L / AST: 14 U/L / GGT: x           PT/INR - ( 2025 01:49 )   PT: 13.9 sec;   INR: 1.22 ratio         PTT - ( 2025 01:49 )  PTT:38.8 sec    ------------------------------------------------------------------------------------------------------------------------------  Assessment:  53 yo M s/p CABG x 3 on 24    Postop acute respiratory failure  Acute blood loss anemia  ESRD on iHD   hx of ESBL   hx of VRE  Serratia infection  MSRA infection   sacral decub     Plan:   ***Neuro***  Postoperative acute pain control with Robaxin and Tylenol  Trazodone, Remeron, and Melatonin nightly  PT ongoing    ***Cardiovascular***  s/p CABG x 3  A flutter - Tolerating beta blocker, PO Amio for rate control  ASA / Statin daily  TTE  ECHO : 1. Left ventricular systolic function is normal.   2. Enlarged right ventricular cavity size and mildly reduced right ventricular systolic function.   3. Left pleural effusion noted.   4. Trace pericardial effusion.    ***Pulmonary***  Postoperative acute respiratory insufficiency - HFTC   Tracheostomy    Mucomyst, normal saline nebs, Xopenex to help mobilize secretions  Left lung PNA, + e coli ESBL - cleared but now with VRE   Trach collar trials ongoing - HFTC at night, TC during the day   Being evaluated for possible left diaphragm plication  - since he is clinically improving will continue current conservative care for now  - thoracic team following  Needs aggressive pulm toileting   Bronchoscopy  for recurrent AUGUSTO opacification     ***GI***  Tolerating Tube feeds at goal  Protonix for stress ulcer prophylaxis.     ***Renal***  ESRD - tolerating iHD  Nephro-Lisette for renal support    ***ID***  No active infection - off antibiotics   Monitor cultures   BAL - commensal manjit    VRE BAL - Linezolid    Acyclovir for + HSV in BAL   Serratia on tissue cx and h/o ESBL PNA   : MRSA and serratia from cyst on the back. Levaquin + Vanco, completed course  Meropenem  -  (leukocytosis - empiric)    BAL commensal manjit     ***Endocrine***  Hyperglycemia - ISS    ***Hematology***  Acute blood loss anemia - no transfusion indication currently.  CBC, coags  Continue Epogen.  Heparin infusion (AF)     Wound:  s/p sacral debridement on  . wound vac placed on  and changed three times a week    *** Lines ***   RUE Midline           Care plan discussed with the ICU care team.   Patient remains critical, at risk for life threatening decompensation.    I have spent 35 minutes providing critical care management to this patient.    By signing my name below, I, Angelo Barbosa, attest that this documentation has been prepared under the direction and in the presence of Blaze Finch MD.  Electronically signed: Angelo Barbosa, 25 @ 08:40    I, Blaze Finch MD,  personally performed the services described in this documentation. all medical record entries made by the scribe were at my direction and in my presence. I have reviewed the chart and agree that the record reflects my personal performance and is accurate and complete  Electronically signed: Blaze Finch MD.

## 2025-02-23 NOTE — PROGRESS NOTE ADULT - SUBJECTIVE AND OBJECTIVE BOX
MR#[id number]  PATIENT NAME:RAAD CANO    DATE OF SERVICE: 02-23-25 @ 06:50  Patient was seen and examined by Solo Quick MD on    02-23-25 @ 06:50 .  Interim events noted.Consultant notes ,Labs,Telemetry reviewed by me       HOSPITAL COURSE: HPI:  54M with PMH of HTN, HLD, ESRD on HD MWF via LUE AVF, ascites (requiring repeat paracenteses q4-6wks), NICM with moderate LV dysfunction w/ EF 35-40%(2023) and CAD s/p atherectomy + SALLIE to D1(4/11/2024) presents to [facility] transferred from [facility]. Patient initially presented to [facility] ED with shortness of breath. Of note he was recently admitted from 09/27-12/02 for acute cholecystitis s/p cholecystectomy. In [facility] ED, pt was hypoxic to 86% on RA, trop 1.7K, EKG no new changes, CXR fluid overload. Admitted for AHRF 2/2 fluid overload. Pt received HD on 12/18, 12/19, 12/20 and 12/22. DAPT was resumed, TTE showed improvement in LVEF to 40-45%. Stress test was abnormal with 1mm inferior STD, reversible ischemia in the inferior wall and fixed defects in the lateral, anterior and apical walls with post stress EF of 24%. Pt was then transferred to [facility] for cardiac cath which showed pLAD 70% ISR, mLCx 70%, D1 severe dz. Patient now transferred to [facility] CTS Dr. Rivers for CABG eval. Patient denies any current SOB or chest pain on admission. Otherwise denies headaches, abdominal pain, urinary or bowel changes, fevers, chills, N/V/D, sick contacts.  (24 Dec 2024 05:07)      INTERIM EVENTS:Patient seen at bedside ,interim events noted.  12/23 Cardiac cath  pLAD 70% ISR, mLCx 70%, D1 severe dz.   12/31-POD#1-C3L free LIMA-LAD; SVG-Diag; SVG-leftPL Awake OOB extubated Sinus rhythm on Dobutamine gtt  02/19 Had Bronch yesterday on vent HS remains in Atrial Flutter  02/21-Awake on HFTC at night Ambulating-Atrial Flutter  02/22-Had Midline Remains in Atrial Flutter on TC  02/23-Lying in bed still on NGT feeds on TC-HFTC-40L/30% Atrial Flutter      MEDICATIONS  (STANDING):  aMIOdarone    Tablet 200 milliGRAM(s) Oral daily  ascorbic acid 500 milliGRAM(s) Oral daily  aspirin  chewable 81 milliGRAM(s) Oral daily  atorvastatin 80 milliGRAM(s) Oral at bedtime  chlorhexidine 0.12% Liquid 15 milliLiter(s) Oral Mucosa every 12 hours  chlorhexidine 2% Cloths 1 Application(s) Topical daily  chlorhexidine 4% Liquid 1 Application(s) Topical <User Schedule>  dextrose 50% Injectable 50 milliLiter(s) IV Push every 15 minutes  epoetin ignacia (EPOGEN) Injectable 91501 Unit(s) IV Push <User Schedule>  ferrous    sulfate Liquid 300 milliGRAM(s) Enteral Tube daily  heparin  Infusion 500 Unit(s)/Hr (14 mL/Hr) IV Continuous <Continuous>  insulin lispro (ADMELOG) corrective regimen sliding scale   SubCutaneous every 6 hours  melatonin 5 milliGRAM(s) Oral at bedtime  methocarbamol 500 milliGRAM(s) Oral every 8 hours  metoprolol tartrate 25 milliGRAM(s) Oral two times a day  mirtazapine 7.5 milliGRAM(s) Oral at bedtime  Nephro-elicia 1 Tablet(s) Oral daily  pantoprazole  Injectable 40 milliGRAM(s) IV Push daily  sodium chloride 0.65% Nasal 1 Spray(s) Both Nostrils every 12 hours  sodium chloride 0.9%. 1000 milliLiter(s) (10 mL/Hr) IV Continuous <Continuous>  sodium zirconium cyclosilicate 10 Gram(s) Oral <User Schedule>  traZODone 50 milliGRAM(s) Oral at bedtime    MEDICATIONS  (PRN):  acetaminophen     Tablet .. 650 milliGRAM(s) Oral every 6 hours PRN Mild Pain (1 - 3)  lidocaine/prilocaine Cream 1 Application(s) Topical daily PRN pre-HD  sodium chloride 0.9% lock flush 10 milliLiter(s) IV Push every 1 hour PRN Pre/post blood products, medications, blood draw, and to maintain line patency            REVIEW OF SYSTEMS:  Constitutional: [ ] fever, [ ]weight loss,  [ ]fatigue [ ]weight gain  Eyes: [ ] visual changes  Respiratory: [ ]shortness of breath;  [ ] cough, [ ]wheezing, [ ]chills, [ ]hemoptysis  Cardiovascular: [ ] chest pain, [ ]palpitations, [ ]dizziness,  [ ]leg swelling[ ]orthopnea[ ]PND  Gastrointestinal: [ ] abdominal pain, [ ]nausea, [ ]vomiting,  [ ]diarrhea [ ]Constipation [ ]Melena  Genitourinary: [ ] dysuria, [ ] hematuria [ ]Vigil  Neurologic: [ ] headaches [ ] tremors[ ]weakness [ ]Paralysis Right[ ] Left[ ]  Skin: [ ] itching, [ ]burning, [ ] rashes  Endocrine: [ ] heat or cold intolerance  Musculoskeletal: [ ] joint pain or swelling; [ ] muscle, back, or extremity pain  Psychiatric: [ ] depression, [ ]anxiety, [ ]mood swings, or [ ]difficulty sleeping  Hematologic: [ ] easy bruising, [ ] bleeding gums    [ ] All remaining systems negative except as per above.   [ ]Unable to obtain.  [x] No change in ROS since admission      Vital Signs Last 24 Hrs  T(C): 37.2 (23 Feb 2025 04:00), Max: 37.2 (23 Feb 2025 04:00)  T(F): 99 (23 Feb 2025 04:00), Max: 99 (23 Feb 2025 04:00)  HR: 80 (23 Feb 2025 06:00) (59 - 108)  BP: 132/59 (23 Feb 2025 06:00) (105/49 - 146/60)  BP(mean): 85 (23 Feb 2025 06:00) (68 - 95)  RR: 19 (23 Feb 2025 06:00) (11 - 35)  SpO2: 81% (23 Feb 2025 06:00) (81% - 100%)    Parameters below as of 23 Feb 2025 04:00  Patient On (Oxygen Delivery Method): [facility]  O2 Flow (L/min): 40  O2 Concentration (%): 30  I&O's Summary    21 Feb 2025 07:01  -  22 Feb 2025 07:00  --------------------------------------------------------  IN: 2149 mL / OUT: 1501 mL / NET: 648 mL    22 Feb 2025 07:01  -  23 Feb 2025 06:50  --------------------------------------------------------  IN: 2297 mL / OUT: 0 mL / NET: 2297 mL        PHYSICAL EXAM:  General: No acute distress BMI-24  HEENT: EOMI, PERRL  Neck: Supple, [ ] JVD[x] Trach collar  Lungs: Equal air entry bilaterally; [ ] rales [ ] wheezing [ ] rhonchi  Heart: Regular rate and rhythm; [x ] murmur   2/6 [ x] systolic [ ] diastolic [ ] radiation[ ] rubs [ ]  gallops  Abdomen: Nontender, bowel sounds present  Extremities: No clubbing, cyanosis, [ ] edema [ ]Pulses  equal and intact  Nervous system:  Alert & Oriented X3, no focal deficits  Psychiatric: Normal affect  Skin: No rashes or lesions    LABS:  02-23    136  |  95[L]  |  40[H]  ----------------------------<  162[H]  4.7   |  28  |  4.86[H]    Ca    8.9      23 Feb 2025 00:44  Phos  3.7     02-23  Mg     2.6     02-23    TPro  6.3  /  Alb  2.7[L]  /  TBili  0.3  /  DBili  x   /  AST  14  /  ALT  9[L]  /  AlkPhos  94  02-23    Creatinine Trend: 4.86<--, 3.85<--, 5.45<--, 4.63<--, 6.07<--, 5.01<--                        7.7    9.55  )-----------( 271      ( 23 Feb 2025 00:43 )             24.5     PT/INR - ( 23 Feb 2025 01:49 )   PT: 13.9 sec;   INR: 1.22 ratio         PTT - ( 23 Feb 2025 01:49 )  PTT:38.8 sec    Xray Chest 1 View- PORTABLE-Routine (Xray Chest 1 View- PORTABLE-Routine in AM.) (02.21.25 @ 07:39) >  COMPARISON STUDY: 2/18/2025    Frontal expiratory view of the chest shows the heart to be similar in  size. Tracheostomy tube and feeding tube remain present.    The lungs show progression of pulmonary congestion with progression of  pleural effusions, left more than right and there is no evidence of  pneumothorax.    Chest one view 2/20/2025 6:12 AM  Compared to the prior study, there is less pulmonary congestion and  smaller pleural effusions.    Chest one view 2/21/2025 6:48 AM  Compared to the prior study, pulmonary congestion and left pleural  effusion have increased.    IMPRESSION:  Congestive changes as noted.     TTE Limited W or WO Ultrasound Enhancing Agent (02.18.25 @ 13:54) >  CONCLUSIONS:      1. Left ventricular systolic function is normal.   2. Enlarged right ventricular cavity size and mildly reduced right ventricular systolic function.   3. Left pleural effusion noted.   4. Trace pericardial effusion.       US Abdomen Upper Quadrant Right (02.18.25 @ 14:04) >  IMPRESSION:  *  Moderate volume ascites.  *  Small right pleural effusion.

## 2025-02-23 NOTE — PROGRESS NOTE ADULT - ASSESSMENT
54M with PMH of HTN, HLD, ESRD on HD MWF via LUE AVF, ascites (requiring repeat paracenteses q4-6wks), NICM with moderate LV dysfunction w/ EF 35-40%(2023) and CAD s/p atherectomy + SALLIE to D1(4/11/2024) presents to [facility] transferred from [facility] for CABG eval  Nephro on board for HD needs.    ESRD on HD   Nephrologist Dr. Bailey  [facility] Schedule / Access:  E AVF  Center: [facility]  Pt refused HD 2/18 as wants to return to [facility] schedule  Pt s/p HD on 2/19 and 2/21.  Plan for HD on 2/24.  Keep MAP>65  Renal Diet  HD consent in chart     Hyper/Hypokalemia  Pt intermittently Hyperkalemia  HD as scheduled  now improved  Low K diet.  C/W Lokelma 10gm on nonHD days.  Monitor K.    HTN  BP fluctuating; controlled.  UF w/ HD as BP permits.  Low sodium diet.  Management per ICU  Monitor BP     CKD MBD  Check PTH   Low PO4 diet.  Not on binder.  Monitor Ca , PO4 daily     Anemia  CRISTIANE with HD  Repeat iron studies.  Transfuse if hgb <7    CAD with multivessel disease  CTICU following  s/p CABG 12/30    Hypoxic respiratory failure requiring Trach  Per surgery  S/p bronchoscopy, pulm following

## 2025-02-24 LAB
ALBUMIN SERPL ELPH-MCNC: 3 G/DL — LOW (ref 3.3–5)
ALP SERPL-CCNC: 101 U/L — SIGNIFICANT CHANGE UP (ref 40–120)
ALT FLD-CCNC: 9 U/L — LOW (ref 10–45)
ANION GAP SERPL CALC-SCNC: 13 MMOL/L — SIGNIFICANT CHANGE UP (ref 5–17)
ANISOCYTOSIS BLD QL: SIGNIFICANT CHANGE UP
APTT BLD: 109.5 SEC — HIGH (ref 24.5–35.6)
APTT BLD: 161 SEC — CRITICAL HIGH (ref 24.5–35.6)
APTT BLD: 75.5 SEC — HIGH (ref 24.5–35.6)
APTT BLD: 80.7 SEC — HIGH (ref 24.5–35.6)
APTT BLD: 85.5 SEC — HIGH (ref 24.5–35.6)
APTT BLD: 86 SEC — HIGH (ref 24.5–35.6)
AST SERPL-CCNC: 13 U/L — SIGNIFICANT CHANGE UP (ref 10–40)
BASOPHILS # BLD AUTO: 0.04 K/UL — SIGNIFICANT CHANGE UP (ref 0–0.2)
BASOPHILS NFR BLD AUTO: 0.4 % — SIGNIFICANT CHANGE UP (ref 0–2)
BILIRUB SERPL-MCNC: 0.3 MG/DL — SIGNIFICANT CHANGE UP (ref 0.2–1.2)
CALCIUM SERPL-MCNC: 9.2 MG/DL — SIGNIFICANT CHANGE UP (ref 8.4–10.5)
CHLORIDE SERPL-SCNC: 94 MMOL/L — LOW (ref 96–108)
CO2 SERPL-SCNC: 28 MMOL/L — SIGNIFICANT CHANGE UP (ref 22–31)
CREAT SERPL-MCNC: 5.72 MG/DL — HIGH (ref 0.5–1.3)
EGFR: 11 ML/MIN/1.73M2 — LOW
EOSINOPHIL # BLD AUTO: 1.03 K/UL — HIGH (ref 0–0.5)
GLUCOSE SERPL-MCNC: 173 MG/DL — HIGH (ref 70–99)
HCT VFR BLD CALC: 24.8 % — LOW (ref 39–50)
HGB BLD-MCNC: 7.8 G/DL — LOW (ref 13–17)
IMM GRANULOCYTES NFR BLD AUTO: 0.8 % — SIGNIFICANT CHANGE UP (ref 0–0.9)
INR BLD: 1.22 RATIO — HIGH (ref 0.85–1.16)
INR BLD: 1.24 RATIO — HIGH (ref 0.85–1.16)
LYMPHOCYTES # BLD AUTO: 0.94 K/UL — LOW (ref 1–3.3)
LYMPHOCYTES # BLD AUTO: 8.9 % — LOW (ref 13–44)
MACROCYTES BLD QL: SIGNIFICANT CHANGE UP
MAGNESIUM SERPL-MCNC: 2.7 MG/DL — HIGH (ref 1.6–2.6)
MANUAL SMEAR VERIFICATION: SIGNIFICANT CHANGE UP
MCHC RBC-ENTMCNC: 31.1 PG — SIGNIFICANT CHANGE UP (ref 27–34)
MCHC RBC-ENTMCNC: 31.5 G/DL — LOW (ref 32–36)
MCV RBC AUTO: 98.8 FL — SIGNIFICANT CHANGE UP (ref 80–100)
MONOCYTES # BLD AUTO: 0.85 K/UL — SIGNIFICANT CHANGE UP (ref 0–0.9)
MONOCYTES NFR BLD AUTO: 8.1 % — SIGNIFICANT CHANGE UP (ref 2–14)
NEUTROPHILS # BLD AUTO: 7.59 K/UL — HIGH (ref 1.8–7.4)
NEUTROPHILS NFR BLD AUTO: 72 % — SIGNIFICANT CHANGE UP (ref 43–77)
PHOSPHATE SERPL-MCNC: 4 MG/DL — SIGNIFICANT CHANGE UP (ref 2.5–4.5)
PLAT MORPH BLD: NORMAL — SIGNIFICANT CHANGE UP
PLATELET # BLD AUTO: 131 K/UL — LOW (ref 150–400)
PLATELET COUNT - ESTIMATE: NORMAL — SIGNIFICANT CHANGE UP
POIKILOCYTOSIS BLD QL AUTO: SLIGHT — SIGNIFICANT CHANGE UP
POTASSIUM SERPL-MCNC: 5.4 MMOL/L — HIGH (ref 3.5–5.3)
POTASSIUM SERPL-SCNC: 5.4 MMOL/L — HIGH (ref 3.5–5.3)
PROTHROM AB SERPL-ACNC: 14 SEC — HIGH (ref 9.9–13.4)
PROTHROM AB SERPL-ACNC: 14.2 SEC — HIGH (ref 9.9–13.4)
RBC # BLD: 2.51 M/UL — LOW (ref 4.2–5.8)
RBC # FLD: 22.4 % — HIGH (ref 10.3–14.5)
RBC BLD AUTO: SIGNIFICANT CHANGE UP
SODIUM SERPL-SCNC: 135 MMOL/L — SIGNIFICANT CHANGE UP (ref 135–145)
VARIANT LYMPHS # BLD: 0.9 % — SIGNIFICANT CHANGE UP (ref 0–6)
VARIANT LYMPHS NFR BLD MANUAL: 0.9 % — SIGNIFICANT CHANGE UP (ref 0–6)
WBC # BLD: 10.53 K/UL — HIGH (ref 3.8–10.5)
WBC # FLD AUTO: 10.53 K/UL — HIGH (ref 3.8–10.5)

## 2025-02-24 PROCEDURE — 71045 X-RAY EXAM CHEST 1 VIEW: CPT | Mod: 26

## 2025-02-24 PROCEDURE — 99291 CRITICAL CARE FIRST HOUR: CPT

## 2025-02-24 RX ORDER — GABAPENTIN 400 MG/1
100 CAPSULE ORAL ONCE
Refills: 0 | Status: COMPLETED | OUTPATIENT
Start: 2025-02-24 | End: 2025-02-24

## 2025-02-24 RX ORDER — ONDANSETRON HCL/PF 4 MG/2 ML
4 VIAL (ML) INJECTION ONCE
Refills: 0 | Status: COMPLETED | OUTPATIENT
Start: 2025-02-24 | End: 2025-02-24

## 2025-02-24 RX ORDER — FENTANYL CITRATE-0.9 % NACL/PF 100MCG/2ML
25 SYRINGE (ML) INTRAVENOUS ONCE
Refills: 0 | Status: DISCONTINUED | OUTPATIENT
Start: 2025-02-24 | End: 2025-02-24

## 2025-02-24 RX ORDER — ACETAMINOPHEN 500 MG/5ML
1000 LIQUID (ML) ORAL ONCE
Refills: 0 | Status: COMPLETED | OUTPATIENT
Start: 2025-02-24 | End: 2025-02-24

## 2025-02-24 RX ADMIN — METHOCARBAMOL 500 MILLIGRAM(S): 500 TABLET, FILM COATED ORAL at 22:12

## 2025-02-24 RX ADMIN — ATORVASTATIN CALCIUM 80 MILLIGRAM(S): 80 TABLET, FILM COATED ORAL at 22:12

## 2025-02-24 RX ADMIN — EPOETIN ALFA 10000 UNIT(S): 10000 SOLUTION INTRAVENOUS; SUBCUTANEOUS at 09:30

## 2025-02-24 RX ADMIN — Medication 4 MILLIGRAM(S): at 20:22

## 2025-02-24 RX ADMIN — Medication 1 APPLICATION(S): at 21:39

## 2025-02-24 RX ADMIN — Medication 500 MILLIGRAM(S): at 11:20

## 2025-02-24 RX ADMIN — INSULIN LISPRO 4: 100 INJECTION, SOLUTION INTRAVENOUS; SUBCUTANEOUS at 06:38

## 2025-02-24 RX ADMIN — Medication 400 MILLIGRAM(S): at 11:19

## 2025-02-24 RX ADMIN — Medication 50 MILLIGRAM(S): at 22:12

## 2025-02-24 RX ADMIN — Medication 1000 MILLIGRAM(S): at 12:00

## 2025-02-24 RX ADMIN — Medication 1 TABLET(S): at 11:20

## 2025-02-24 RX ADMIN — METHOCARBAMOL 500 MILLIGRAM(S): 500 TABLET, FILM COATED ORAL at 12:46

## 2025-02-24 RX ADMIN — HEPARIN SODIUM 13 UNIT(S)/HR: 1000 INJECTION INTRAVENOUS; SUBCUTANEOUS at 13:45

## 2025-02-24 RX ADMIN — Medication 81 MILLIGRAM(S): at 11:20

## 2025-02-24 RX ADMIN — MIRTAZAPINE 7.5 MILLIGRAM(S): 30 TABLET, FILM COATED ORAL at 22:12

## 2025-02-24 RX ADMIN — Medication 1000 MILLIGRAM(S): at 01:30

## 2025-02-24 RX ADMIN — INSULIN LISPRO 2: 100 INJECTION, SOLUTION INTRAVENOUS; SUBCUTANEOUS at 11:25

## 2025-02-24 RX ADMIN — HEPARIN SODIUM 14 UNIT(S)/HR: 1000 INJECTION INTRAVENOUS; SUBCUTANEOUS at 08:03

## 2025-02-24 RX ADMIN — AMIODARONE HYDROCHLORIDE 200 MILLIGRAM(S): 50 INJECTION, SOLUTION INTRAVENOUS at 11:19

## 2025-02-24 RX ADMIN — Medication 40 MILLIGRAM(S): at 11:20

## 2025-02-24 RX ADMIN — Medication 300 MILLIGRAM(S): at 11:19

## 2025-02-24 RX ADMIN — METHOCARBAMOL 500 MILLIGRAM(S): 500 TABLET, FILM COATED ORAL at 06:38

## 2025-02-24 RX ADMIN — GABAPENTIN 100 MILLIGRAM(S): 400 CAPSULE ORAL at 01:18

## 2025-02-24 RX ADMIN — Medication 5 MILLIGRAM(S): at 22:12

## 2025-02-24 RX ADMIN — METOPROLOL SUCCINATE 25 MILLIGRAM(S): 50 TABLET, EXTENDED RELEASE ORAL at 17:32

## 2025-02-24 RX ADMIN — Medication 400 MILLIGRAM(S): at 01:00

## 2025-02-24 RX ADMIN — Medication 25 MICROGRAM(S): at 15:23

## 2025-02-24 RX ADMIN — LIDOCAINE AND PRILOCAINE 1 APPLICATION(S): 25; 25 CREAM TOPICAL at 08:30

## 2025-02-24 NOTE — CHART NOTE - NSCHARTNOTEFT_GEN_A_CORE
NUTRITION FOLLOW UP NOTE    PATIENT SEEN FOR: ICU follow up    SOURCE: [] Patient  [x] Current Medical Record  [x] RN  [] Family/support person at bedside  [x] Patient unavailable/inappropriate  [] Other:  -Pt asleep during HD with sleep mask on at time of visit    CHART REVIEWED/EVENTS NOTED.  [x] No changes to nutrition care plan to note  [x] Nutrition Status:  -s/p CABG x 3 on 24  -25 tracheostomy placement. tolerating TC  -s/p sacral debridement on 25. wound vac placed on 25  -ESRD - tolerating iHD, on HD PTA.    DIET ORDER:   Diet, NPO with Tube Feed:   Tube Feeding Modality: Nasogastric  Vital 1.5 Jeremias (VITAL1.5RTH)  Continuous  Starting Tube Feed Rate {mL per Hour}: 10  Increase Tube Feed Rate by (mL): 10     Every hour  Until Goal Tube Feed Rate (mL per Hour): 70  Tube Feed Duration (in Hours): 24  Tube Feed Start Time: 18:30    Start Time: 23:00 (25)      CURRENT DIET ORDER IS:  [x] Appropriate:  [] Inadequate:  [] Other:    NUTRITION INTAKE/PROVISION:  [] PO:  [x] Enteral Nutrition:  EN Order Provides:  1680 ml formula, 2520kcal, 113g protein, 1284ml free water; meets 32.2 kcal/kg and 1.45 g/kg protein based on IBW 172lb/78kg.    Current Pump Rate: 70ml/hr  5-Day Average Enteral Provision per RN flowsheet: 1618 mL, 2427 kcals, 109 g protein   [] Parenteral Nutrition:    ANTHROPOMETRICS:  Drug Dosing Weight  Height (cm): 180.3 (30 Dec 2024 12:01)  Weight (kg): 85.1 (30 Dec 2024 12:01)  BMI (kg/m2): 26.2 (30 Dec 2024 12:01)  Weights:   Daily Weight in k.5 (-), 81.6 (), 81.8 (), 72 (-), 72.8 (-), 78.3 (-), 83 (-), 73 (-17), 80.6 (-16), 80.6 (-15), 84.4 (-14), 78 (13), 74.7 (), 85.7 (), 85 (02-10), 88 (02-10), 85.9 (), 80.8 (), 87 (), 86 (), 91.2 (), 97.1 (), 78.7 (), 88 (), 83.4 (), 77.7 (), 81 (), 88.7 (), 83.3 (), 97.6 (01-10), 79.6 (), 93.4 (), 85 (), 90 (), 84 (), 77.5 (), 87.8 (), 88.3 (, standing), 85.3 (, standing), 85 (, standing), 79.7 (), 80.9 (-, standing), 81.3 (-, standing), 82.6 ().  Weight fluctuations likely in setting of fluid shifts and/or scale inaccuracies. RD to continue to monitor weight trends as available/able.     NUTRITIONALLY PERTINENT MEDICATIONS:  MEDICATIONS  (STANDING):  aMIOdarone    Tablet 200 milliGRAM(s) Oral daily  ascorbic acid 500 milliGRAM(s) Oral daily  atorvastatin 80 milliGRAM(s) Oral at bedtime  dextrose 50% Injectable 50 milliLiter(s) IV Push every 15 minutes  epoetin ignacia (EPOGEN) Injectable 85952 Unit(s) IV Push <User Schedule>  ferrous    sulfate Liquid 300 milliGRAM(s) Enteral Tube daily  insulin lispro (ADMELOG) corrective regimen sliding scale   SubCutaneous every 6 hours  methocarbamol 500 milliGRAM(s) Oral every 8 hours  metoprolol tartrate 25 milliGRAM(s) Oral two times a day  mirtazapine 7.5 milliGRAM(s) Oral at bedtime  Nephro-elicia 1 Tablet(s) Oral daily  pantoprazole  Injectable 40 milliGRAM(s) IV Push daily  sodium zirconium cyclosilicate 10 Gram(s) Oral <User Schedule>  traZODone 50 milliGRAM(s) Oral at bedtime      NUTRITIONALLY PERTINENT LABS:   Na135 mmol/L Glu 173 mg/dL[H] K+ 5.4 mmol/L[H] Cr  5.72 mg/dL[H] BUN 51 mg/dL[H]  Phos 4.0 mg/dL  Alb 3.0 g/dL[L] ALT 9 U/L[L] AST 13 U/L Alkaline Phosphatase 101 U/L    A1C with Estimated Average Glucose Result: 5.6 % (24 @ 06:50)          Finger Sticks:  POCT Blood Glucose.: 151 mg/dL ( @ 11:23)  POCT Blood Glucose.: 208 mg/dL ( @ 06:22)  POCT Blood Glucose.: 200 mg/dL ( @ 17:46)      NUTRITIONALLY PERTINENT MEDICATIONS/LABS:  [x] Reviewed  [x] Relevant notes on medications/labs:  -insulin for glycemic control  -remeron which can stimulate appetite  -lokelma 3x/ week on non-HD days  -elevated K+ today noted, phosphorus WNL    EDEMA:  [x] Reviewed  [x] Relevant notes: No noted edema as per flowsheets.     GI/ I&O:  [x] Reviewed  [x] Relevant notes: no gastrointestinal signs/symptoms per flowsheets and last BM yesterday  [] Other:    SKIN:   [] No pressure injuries documented, per nursing flowsheet  [x] Pressure injury previously noted  -sacrum unstageable pressure injury per flowsheets   [] Change in pressure injury documentation:  [x] Other:  -Per Wound Care : "Sacral/buttocks wound 2/2 skin failure, Rt upper back injury now resolved"  -midsternal surgical incision from CABG     ESTIMATED NEEDS:  [x] No change:  [] Updated:  Energy:  4005-0218 kcal/day (30-35 kcal/kg)  Protein:  109-140 g/day (1.4-1.8 g/kg)  Fluid:   ml/day or [x] defer to team  Based on: IBW 172lb/78kg    NUTRITION DIAGNOSIS:  [x] Prior Dx:  Inadequate energy intake, Increased Nutrient Needs (protein-energy, micronutrients)  [] New Dx:    EDUCATION:  [] Yes:  [x] Not appropriate/warranted    NUTRITION CARE PLAN:  1. Diet: Continue enteral nutrition of Vital 1.5 @ 70ml/hr x 24hr. Provides 1680 ml formula, 2520kcal, 113g protein, 1284ml free water; meets 32.2 kcal/kg and 1.45 g/kg protein based on IBW 172lb/78kg.  2. Multivitamin/mineral supplementation: continue nephro-elicia, defer Vitamin C to medical team in setting of HD.  3. As appropriate, begin to assess for PO diet readiness.    [] Achieved - Continue current nutrition intervention(s)  [] Current medical condition precludes nutrition intervention at this time.    MONITORING AND EVALUATION:   RD remains available upon request and will follow up per protocol.    Corinne Lima RDN, CDN - available via [facility] TEAMS

## 2025-02-24 NOTE — PROGRESS NOTE ADULT - ASSESSMENT
54M with PMH of HTN, HLD, ESRD on HD MWF via LUE AVF, ascites (requiring repeat paracenteses q4-6wks), NICM with moderate LV dysfunction w/ EF 35-40%() and CAD s/p atherectomy + SALLIE to D1(2024) presents to [facility] transferred from [facility]. Patient initially presented to [facility] ED with shortness of breath. Of note he was recently admitted from - for acute cholecystitis s/p cholecystectomy. In [facility] ED, pt was hypoxic to 86% on RA, trop 1.7K, EKG no new changes, CXR fluid overload. Admitted for AHRF 2/2 fluid overload. Pt received HD on , ,  and . DAPT was resumed, TTE showed improvement in LVEF to 40-45%. Stress test was abnormal with 1mm inferior STD, reversible ischemia in the inferior wall and fixed defects in the lateral, anterior and apical walls with post stress EF of 24%.     Pt was then transferred to [facility] for cardiac cath which showed pLAD 70% ISR, mLCx 70%, D1 severe dz. Patient now transferred to [facility] CTS Dr. Rivers for CABG eval.# CAD s/p NSTEMI  Hx CAD s/p PCI LAD   Presented with ADHF elevated troponins  Positive NST1-Cath- pLAD 70% ISR, mLCx 70%, D1 severe dz.   TTE EF 35% Severe TRWas on Plavix-p2y12- 321 been off Plavix since -POD#1-C3L free LIMA-LAD; SVG-Diag; NBF-cdztSN-Dipdtqmjp on  Sinus rhythm,Amio for AF prophylaxis  -Awake noted AF RVR no further bradyarrhythmias-Started BBlockers  02/10-Persistent AF tolerating Amio BBlockers Pericardial Effusion decreased consider resuming AC  -Seen by Thoracic-? Diaphragmatic plication,AF on HD  -Sniff test +  02/15-Awake AF,   -Tolerating TC-remains in AF/AFl is ambulating  -On HFTC ambulating AFl ~~80;s on heparin gtt  -s/p Bronch AFl ~~90's on Vent HS TC daytime   Atrial Flutter on TC during day Ambulating  -Increased congestion on CXR had HD yesterday remains in AFl   -Atrial Flutter HFTC 40L/30% BP stable     -Atrial Flutter tolerating TC for HD MWF-consider DOAC    # Acute on Chronic Systolic Heart Failure now improved  EF-35% ,severe TR  Had HD post op  GDMT post op  LVEF improved post bypass   -TTE EF 40%,trace effusion  ECG c/w pericarditis-was on colchicine   01/15-TTE EF 55%  -TTE EF 60%   -Normal LV systolic function Moderate pericardial effusion  -On TC-HF and Vent support at nights   02/10-Vent HS with T Collar trial Ambulated Remains in AF  Repeat TTE Normal LV Systolic function Small Pericardial effusion decreased from 2/3        Vascular evaluated  AVGraft /?steal causing RV failure-'' Very low suspicion of steal Sd and AVF causing current clinical state. ''   VA Duplex Hemodialysis Access, Left (25 @ 13:35) >   Arterial flow volume of 1301 mL/m, and the venous flow volume of  1492 mL/m are consistent with a normally functioning dialysis access  fistula.      # Ascites  Hx chronic ascites  Has drainage q4-6 weeks  Last drained -4600mL  ? ascites related to severe TR  Had orquwawkpbke-Jevmkrz-vl growth   CT Abdomen 01/10-Large volume ascites-  US abdomen--Small to moderate volume abdominal/pelvic ascites  US abdomen  Moderate ascites    # ESRD  Now on  HD-MWF

## 2025-02-24 NOTE — PROGRESS NOTE ADULT - SUBJECTIVE AND OBJECTIVE BOX
Patient seen and examined at the bedside.    Remains critically ill on continuous ICU monitoring, at risk for life threatening decompensation.  All Labs, data reviewed. Plan of care discussed in length during multi-disciplinary ICU rounds.       Brief Summary:  55 yo M with multiple medical problems including CAD s/p PCI in April 2024 s/p CABG x 3 on 12/30/24  1/2: Intubated for hypoxia & left lung white out s/p awake bronch with removal of copious mucopurulent secretions  1/4: s/p IABP insertion, sepsis/septic shock due to E coli   ESBL pneumonia, collapsed Left Lung, s/p multiple bronch   2/6: bronch today off positive pressure. 2/1: MRSA and serratia from cyst on the back. Plan for Levaquin + Vanc x 5 days  2/8 : concern for left food drop   2/10 : no acute issues - CXR shows improving left sided aeration, pt subjectively reports having difficulty while lying flat  2/11: s/p Paracentesis 3.5 L removed, leukocytosis   2/12: Ascites fluid PMN < 250 (less likely SBP)   sniff test yesterday showed paradoxical diaphragmatic motion    2/13: mucus plugging but able to clear airway with aggressive pulmonary toileting.   2/14: no vent requirement overnight, able to mobilize secretion with pulm toileting  hyperkalemia post HD - workup for possible recirculation underway   2/16: On TC X 48 hours - ambulating well, no mucus plugging events    24 Hour events:  no acute events overnight  last iHD session 2/21  tolerated TC for > 24 hrs     Objective:  Vital Signs Last 24 Hrs  T(C): 36.7 (24 Feb 2025 04:00), Max: 37.2 (23 Feb 2025 16:00)  T(F): 98 (24 Feb 2025 04:00), Max: 99 (23 Feb 2025 16:00)  HR: 57 (24 Feb 2025 08:00) (55 - 106)  BP: 142/54 (24 Feb 2025 08:00) (110/51 - 149/65)  BP(mean): 78 (24 Feb 2025 08:00) (68 - 95)  RR: 13 (24 Feb 2025 08:00) (13 - 38)  SpO2: 96% (24 Feb 2025 08:00) (90% - 100%)    Parameters below as of 24 Feb 2025 08:00  Patient On (Oxygen Delivery Method): High flow Trach Collar   O2 Flow (L/min): 40  O2 Concentration (%): 30    Mode: HFTC  FiO2: 40              Physical Exam:   General: Alert and interactive  Neurology: Oriented, following commands  Respiratory: Clear bilaterally, Trach site ok.  CV: AFib  Abdominal: Soft, Nontender  Extremities: Warm, well-perfused  -------------------------------------------------------------------------------------------------------------------------------    Labs:                        7.8    10.53 )-----------( 131      ( 24 Feb 2025 00:39 )             24.8     02-24    135  |  94[L]  |  51[H]  ----------------------------<  173[H]  5.4[H]   |  28  |  5.72[H]    Ca    9.2      24 Feb 2025 00:39  Phos  4.0     02-24  Mg     2.7     02-24    TPro  6.6  /  Alb  3.0[L]  /  TBili  0.3  /  DBili  x   /  AST  13  /  ALT  9[L]  /  AlkPhos  101  02-24    LIVER FUNCTIONS - ( 24 Feb 2025 00:39 )  Alb: 3.0 g/dL / Pro: 6.6 g/dL / ALK PHOS: 101 U/L / ALT: 9 U/L / AST: 13 U/L / GGT: x           PT/INR - ( 24 Feb 2025 06:29 )   PT: 14.2 sec;   INR: 1.24 ratio         PTT - ( 24 Feb 2025 07:03 )  PTT:85.5 sec        ALL MEDICATIONS   MEDICATIONS  (STANDING):  aMIOdarone    Tablet 200 milliGRAM(s) Oral daily  ascorbic acid 500 milliGRAM(s) Oral daily  aspirin  chewable 81 milliGRAM(s) Oral daily  atorvastatin 80 milliGRAM(s) Oral at bedtime  chlorhexidine 0.12% Liquid 15 milliLiter(s) Oral Mucosa every 12 hours  chlorhexidine 2% Cloths 1 Application(s) Topical daily  chlorhexidine 4% Liquid 1 Application(s) Topical <User Schedule>  dextrose 50% Injectable 50 milliLiter(s) IV Push every 15 minutes  epoetin ignacia (EPOGEN) Injectable 39168 Unit(s) IV Push <User Schedule>  ferrous    sulfate Liquid 300 milliGRAM(s) Enteral Tube daily  heparin  Infusion 500 Unit(s)/Hr (14 mL/Hr) IV Continuous <Continuous>  insulin lispro (ADMELOG) corrective regimen sliding scale   SubCutaneous every 6 hours  melatonin 5 milliGRAM(s) Oral at bedtime  methocarbamol 500 milliGRAM(s) Oral every 8 hours  metoprolol tartrate 25 milliGRAM(s) Oral two times a day  mirtazapine 7.5 milliGRAM(s) Oral at bedtime  Nephro-elicia 1 Tablet(s) Oral daily  pantoprazole  Injectable 40 milliGRAM(s) IV Push daily  sodium chloride 0.65% Nasal 1 Spray(s) Both Nostrils every 12 hours  sodium chloride 0.9%. 1000 milliLiter(s) (10 mL/Hr) IV Continuous <Continuous>  sodium zirconium cyclosilicate 10 Gram(s) Oral <User Schedule>  traZODone 50 milliGRAM(s) Oral at bedtime    MEDICATIONS  (PRN):  acetaminophen     Tablet .. 650 milliGRAM(s) Oral every 6 hours PRN Mild Pain (1 - 3)  lidocaine/prilocaine Cream 1 Application(s) Topical daily PRN pre-HD  sodium chloride 0.9% lock flush 10 milliLiter(s) IV Push every 1 hour PRN Pre/post blood products, medications, blood draw, and to maintain line patency  ------------------------------------------------------------------------------------------------------------------------------  Assessment:  55 yo M s/p CABG x 3 on 12/30/24    Postop acute respiratory failure  Acute blood loss anemia  ESRD on iHD   hx of ESBL   hx of VRE  Serratia infection  MSRA infection   sacral decub     Plan:   ***Neuro***  Postoperative acute pain control with Robaxin and Tylenol  Trazodone, Remeron, and Melatonin nightly  PT ongoing    ***Cardiovascular***  s/p CABG x 3  A flutter - Tolerating beta blocker, PO Amio for rate control  ASA / Statin daily      ***Pulmonary***  Postoperative acute respiratory insufficiency - HFTC   Tracheostomy 1/18   Mucomyst, normal saline nebs, Xopenex to help mobilize secretions  Left lung PNA, + e coli ESBL - cleared but now with VRE   Needs aggressive pulm toileting   Bronchoscopy 2/18 for recurrent AUGUSTO opacification     ***GI***  Tolerating Tube feeds at goal  Protonix for stress ulcer prophylaxis.     ***Renal***  ESRD - tolerating iHD  Nephro-Elicia for renal support    ***ID***  No active infection - off antibiotics   Monitor cultures  1/28 BAL - commensal manjit   1/24 VRE BAL - Linezolid   1/24 Acyclovir for + HSV in BAL  1/26 Serratia on tissue cx and h/o ESBL PNA   2/1: MRSA and serratia from cyst on the back. Levaquin + Vanco, completed course  2/28 BAL commensal manjit     ***Endocrine***  Hyperglycemia - ISS    ***Hematology***  Acute blood loss anemia - no transfusion indication currently.  CBC, coags  Continue Epogen.  Heparin infusion (AF)       I, Stella Hodges MD, personally performed the services described in this documentation. all medical record entries made by the scribe were at my direction and in my presence. I have reviewed the chart and agree that the record reflects my personal performance and is accurate and complete  Electronically signed:   Stella Hodges MD  CT ICU attending     ICU time: ** mins     By signing my name below, I, Sonia Sánchez, attest that this documentation has been prepared under the direction and in the presence of Stella Hodges MD  Electronically signed: Sonia Sánchez, 02-24-25 @ 08:32             Patient seen and examined at the bedside.    Remains critically ill on continuous ICU monitoring, at risk for life threatening decompensation.  All Labs, data reviewed. Plan of care discussed in length during multi-disciplinary ICU rounds.       Brief Summary:  55 yo M with multiple medical problems including CAD s/p PCI in April 2024 s/p CABG x 3 on 12/30/24  1/2: Intubated for hypoxia & left lung white out s/p awake bronch with removal of copious mucopurulent secretions  1/4: s/p IABP insertion, sepsis/septic shock due to E coli   ESBL pneumonia, collapsed Left Lung, s/p multiple bronch   2/6: bronch today off positive pressure. 2/1: MRSA and serratia from cyst on the back. Plan for Levaquin + Vanc x 5 days  2/8 : concern for left food drop   2/10 : no acute issues - CXR shows improving left sided aeration, pt subjectively reports having difficulty while lying flat  2/11: s/p Paracentesis 3.5 L removed, leukocytosis   2/12: Ascites fluid PMN < 250 (less likely SBP)   sniff test yesterday showed paradoxical diaphragmatic motion    2/13: mucus plugging but able to clear airway with aggressive pulmonary toileting.   2/14: no vent requirement overnight, able to mobilize secretion with pulm toileting  hyperkalemia post HD - workup for possible recirculation underway   2/16: On TC X 48 hours - ambulating well, no mucus plugging events  2/22: HFTC x24hrs (40L 30%)    24 Hour events:  no acute events overnight  last iHD session 2/21, iHD today  tolerated TC for > 48hrs  chest PT. ambulate    Objective:  Vital Signs Last 24 Hrs  T(C): 36.7 (24 Feb 2025 04:00), Max: 37.2 (23 Feb 2025 16:00)  T(F): 98 (24 Feb 2025 04:00), Max: 99 (23 Feb 2025 16:00)  HR: 57 (24 Feb 2025 08:00) (55 - 106)  BP: 142/54 (24 Feb 2025 08:00) (110/51 - 149/65)  BP(mean): 78 (24 Feb 2025 08:00) (68 - 95)  RR: 13 (24 Feb 2025 08:00) (13 - 38)  SpO2: 96% (24 Feb 2025 08:00) (90% - 100%)    Parameters below as of 24 Feb 2025 08:00  Patient On (Oxygen Delivery Method): High flow Trach Collar   O2 Flow (L/min): 40  O2 Concentration (%): 30    Mode: HFTC  FiO2: 40              Physical Exam:   General: Alert and interactive  Neurology: Oriented, following commands  Respiratory: Clear bilaterally, Trach site ok.  CV: AFib  Abdominal: Soft, Nontender  Extremities: Warm, well-perfused  -------------------------------------------------------------------------------------------------------------------------------    Labs:                        7.8    10.53 )-----------( 131      ( 24 Feb 2025 00:39 )             24.8     02-24    135  |  94[L]  |  51[H]  ----------------------------<  173[H]  5.4[H]   |  28  |  5.72[H]    Ca    9.2      24 Feb 2025 00:39  Phos  4.0     02-24  Mg     2.7     02-24    TPro  6.6  /  Alb  3.0[L]  /  TBili  0.3  /  DBili  x   /  AST  13  /  ALT  9[L]  /  AlkPhos  101  02-24    LIVER FUNCTIONS - ( 24 Feb 2025 00:39 )  Alb: 3.0 g/dL / Pro: 6.6 g/dL / ALK PHOS: 101 U/L / ALT: 9 U/L / AST: 13 U/L / GGT: x           PT/INR - ( 24 Feb 2025 06:29 )   PT: 14.2 sec;   INR: 1.24 ratio         PTT - ( 24 Feb 2025 07:03 )  PTT:85.5 sec        ALL MEDICATIONS   MEDICATIONS  (STANDING):  aMIOdarone    Tablet 200 milliGRAM(s) Oral daily  ascorbic acid 500 milliGRAM(s) Oral daily  aspirin  chewable 81 milliGRAM(s) Oral daily  atorvastatin 80 milliGRAM(s) Oral at bedtime  chlorhexidine 0.12% Liquid 15 milliLiter(s) Oral Mucosa every 12 hours  chlorhexidine 2% Cloths 1 Application(s) Topical daily  chlorhexidine 4% Liquid 1 Application(s) Topical <User Schedule>  dextrose 50% Injectable 50 milliLiter(s) IV Push every 15 minutes  epoetin ignacia (EPOGEN) Injectable 70090 Unit(s) IV Push <User Schedule>  ferrous    sulfate Liquid 300 milliGRAM(s) Enteral Tube daily  heparin  Infusion 500 Unit(s)/Hr (14 mL/Hr) IV Continuous <Continuous>  insulin lispro (ADMELOG) corrective regimen sliding scale   SubCutaneous every 6 hours  melatonin 5 milliGRAM(s) Oral at bedtime  methocarbamol 500 milliGRAM(s) Oral every 8 hours  metoprolol tartrate 25 milliGRAM(s) Oral two times a day  mirtazapine 7.5 milliGRAM(s) Oral at bedtime  Nephro-elicia 1 Tablet(s) Oral daily  pantoprazole  Injectable 40 milliGRAM(s) IV Push daily  sodium chloride 0.65% Nasal 1 Spray(s) Both Nostrils every 12 hours  sodium chloride 0.9%. 1000 milliLiter(s) (10 mL/Hr) IV Continuous <Continuous>  sodium zirconium cyclosilicate 10 Gram(s) Oral <User Schedule>  traZODone 50 milliGRAM(s) Oral at bedtime    MEDICATIONS  (PRN):  acetaminophen     Tablet .. 650 milliGRAM(s) Oral every 6 hours PRN Mild Pain (1 - 3)  lidocaine/prilocaine Cream 1 Application(s) Topical daily PRN pre-HD  sodium chloride 0.9% lock flush 10 milliLiter(s) IV Push every 1 hour PRN Pre/post blood products, medications, blood draw, and to maintain line patency  ------------------------------------------------------------------------------------------------------------------------------  Assessment:  55 yo M s/p CABG x 3 on 12/30/24    Postop acute respiratory failure  Acute blood loss anemia  ESRD on iHD   hx of ESBL   hx of VRE  Serratia infection  MSRA infection   sacral decub     Plan:   ***Neuro***  Postoperative acute pain control with Robaxin and Tylenol  Trazodone, Remeron, and Melatonin nightly  PT ongoing    ***Cardiovascular***  s/p CABG x 3  A flutter - Tolerating beta blocker, PO Amio for rate control  ASA / Statin daily      ***Pulmonary***  Postoperative acute respiratory insufficiency - HFTC   Tracheostomy 1/18   Mucomyst, normal saline nebs, Xopenex to help mobilize secretions  Left lung PNA, + e coli ESBL - cleared but now with VRE   Needs aggressive pulm toileting   Bronchoscopy 2/18 for recurrent AUGUSTO opacification     ***GI***  Tolerating Tube feeds at goal  Protonix for stress ulcer prophylaxis.     ***Renal***  ESRD - tolerating iHD  Nephro-Elicia for renal support    ***ID***  No active infection - off antibiotics   Monitor cultures  1/28 BAL - commensal manjit   1/24 VRE BAL - Linezolid   1/24 Acyclovir for + HSV in BAL  1/26 Serratia on tissue cx and h/o ESBL PNA   2/1: MRSA and serratia from cyst on the back. Levaquin + Vanco, completed course  2/28 BAL commensal manjit     ***Endocrine***  Hyperglycemia - ISS    ***Hematology***  Acute blood loss anemia - no transfusion indication currently.  CBC, coags  Continue Epogen.  Heparin infusion (AF)       I, Stella Hodges MD, personally performed the services described in this documentation. all medical record entries made by the scribe were at my direction and in my presence. I have reviewed the chart and agree that the record reflects my personal performance and is accurate and complete  Electronically signed:   Stella Hodges MD  CT ICU attending     ICU time: 45 mins     By signing my name below, I, Sonia Sánchez, attest that this documentation has been prepared under the direction and in the presence of Stella Hodges MD  Electronically signed: Sonia Sánchez, 02-24-25 @ 08:32

## 2025-02-24 NOTE — PROGRESS NOTE ADULT - SUBJECTIVE AND OBJECTIVE BOX
MR#[id number]  PATIENT NAME:RAAD CANO    DATE OF SERVICE: 02-24-25 @ 07:12  Patient was seen and examined by Solo Quick MD on    02-24-25 @ 07:12 .  Interim events noted.Consultant notes ,Labs,Telemetry reviewed by me       HOSPITAL COURSE: HPI:  54M with PMH of HTN, HLD, ESRD on HD MWF via LUE AVF, ascites (requiring repeat paracenteses q4-6wks), NICM with moderate LV dysfunction w/ EF 35-40%(2023) and CAD s/p atherectomy + SALLIE to D1(4/11/2024) presents to [facility] transferred from [facility]. Patient initially presented to [facility] ED with shortness of breath. Of note he was recently admitted from 09/27-12/02 for acute cholecystitis s/p cholecystectomy. In [facility] ED, pt was hypoxic to 86% on RA, trop 1.7K, EKG no new changes, CXR fluid overload. Admitted for AHRF 2/2 fluid overload. Pt received HD on 12/18, 12/19, 12/20 and 12/22. DAPT was resumed, TTE showed improvement in LVEF to 40-45%. Stress test was abnormal with 1mm inferior STD, reversible ischemia in the inferior wall and fixed defects in the lateral, anterior and apical walls with post stress EF of 24%. Pt was then transferred to [facility] for cardiac cath which showed pLAD 70% ISR, mLCx 70%, D1 severe dz. Patient now transferred to [facility] CTS Dr. Rivers for CABG eval. Patient denies any current SOB or chest pain on admission. Otherwise denies headaches, abdominal pain, urinary or bowel changes, fevers, chills, N/V/D, sick contacts.  (24 Dec 2024 05:07)      INTERIM EVENTS:Patient seen at bedside ,interim events noted.  12/23 Cardiac cath  pLAD 70% ISR, mLCx 70%, D1 severe dz.   12/31-POD#1-C3L free LIMA-LAD; SVG-Diag; SVG-leftPL Awake OOB extubated Sinus rhythm on Dobutamine gtt  02/19 Had Bronch yesterday on vent HS remains in Atrial Flutter  02/21-Awake on HFTC at night Ambulating-Atrial Flutter  02/22-Had Midline Remains in Atrial Flutter on TC  02/23-Lying in bed still on NGT feeds on TC-HFTC-40L/30% Atrial Flutter  02/24-Awake Tolerating TC Atrial Flutter~~'s  For HD today      PMH -reviewed admission note, no change since admission    MEDICATIONS  (STANDING):  aMIOdarone    Tablet 200 milliGRAM(s) Oral daily  ascorbic acid 500 milliGRAM(s) Oral daily  aspirin  chewable 81 milliGRAM(s) Oral daily  atorvastatin 80 milliGRAM(s) Oral at bedtime  chlorhexidine 0.12% Liquid 15 milliLiter(s) Oral Mucosa every 12 hours  chlorhexidine 2% Cloths 1 Application(s) Topical daily  chlorhexidine 4% Liquid 1 Application(s) Topical <User Schedule>  dextrose 50% Injectable 50 milliLiter(s) IV Push every 15 minutes  epoetin ignacia (EPOGEN) Injectable 13226 Unit(s) IV Push <User Schedule>  ferrous    sulfate Liquid 300 milliGRAM(s) Enteral Tube daily  heparin  Infusion 500 Unit(s)/Hr (15 mL/Hr) IV Continuous <Continuous>  insulin lispro (ADMELOG) corrective regimen sliding scale   SubCutaneous every 6 hours  melatonin 5 milliGRAM(s) Oral at bedtime  methocarbamol 500 milliGRAM(s) Oral every 8 hours  metoprolol tartrate 25 milliGRAM(s) Oral two times a day  mirtazapine 7.5 milliGRAM(s) Oral at bedtime  Nephro-elicia 1 Tablet(s) Oral daily  pantoprazole  Injectable 40 milliGRAM(s) IV Push daily  sodium chloride 0.65% Nasal 1 Spray(s) Both Nostrils every 12 hours  sodium chloride 0.9%. 1000 milliLiter(s) (10 mL/Hr) IV Continuous <Continuous>  sodium zirconium cyclosilicate 10 Gram(s) Oral <User Schedule>  traZODone 50 milliGRAM(s) Oral at bedtime    MEDICATIONS  (PRN):  acetaminophen     Tablet .. 650 milliGRAM(s) Oral every 6 hours PRN Mild Pain (1 - 3)  lidocaine/prilocaine Cream 1 Application(s) Topical daily PRN pre-HD  sodium chloride 0.9% lock flush 10 milliLiter(s) IV Push every 1 hour PRN Pre/post blood products, medications, blood draw, and to maintain line patency            REVIEW OF SYSTEMS:  Constitutional: [ ] fever, [ ]weight loss,  [x ]fatigue [ ]weight gain  Eyes: [ ] visual changes  Respiratory: [ ]shortness of breath;  [ ] cough, [ ]wheezing, [ ]chills, [ ]hemoptysis  Cardiovascular: [ ] chest pain, [ ]palpitations, [ ]dizziness,  [ ]leg swelling[ ]orthopnea[ ]PND  Gastrointestinal: [ ] abdominal pain, [ ]nausea, [ ]vomiting,  [ ]diarrhea [ ]Constipation [ ]Melena  Genitourinary: [ ] dysuria, [ ] hematuria [ ]Vigil  Neurologic: [ ] headaches [ ] tremors[ ]weakness [ ]Paralysis Right[ ] Left[ ]  Skin: [ ] itching, [ ]burning, [ ] rashes  Endocrine: [ ] heat or cold intolerance  Musculoskeletal: [ ] joint pain or swelling; [ ] muscle, back, or extremity pain  Psychiatric: [ ] depression, [ ]anxiety, [ ]mood swings, or [ ]difficulty sleeping  Hematologic: [ ] easy bruising, [ ] bleeding gums    [ ] All remaining systems negative except as per above.   [ ]Unable to obtain.  [x] No change in ROS since admission      Vital Signs Last 24 Hrs  T(C): 36.7 (24 Feb 2025 04:00), Max: 37.2 (23 Feb 2025 16:00)  T(F): 98 (24 Feb 2025 04:00), Max: 99 (23 Feb 2025 16:00)  HR: 57 (24 Feb 2025 07:00) (55 - 106)  BP: 120/54 (24 Feb 2025 07:00) (110/51 - 149/65)  BP(mean): 78 (24 Feb 2025 07:00) (68 - 95)  RR: 16 (24 Feb 2025 07:00) (14 - 38)  SpO2: 97% (24 Feb 2025 07:00) (90% - 100%)    Parameters below as of 24 Feb 2025 04:00  Patient On (Oxygen Delivery Method): [facility]  O2 Flow (L/min): 40  O2 Concentration (%): 30  I&O's Summary    23 Feb 2025 07:01  -  24 Feb 2025 07:00  --------------------------------------------------------  IN: 2073.5 mL / OUT: 0 mL / NET: 2073.5 mL        PHYSICAL EXAM:  General: No acute distress BMI-24  HEENT: EOMI, PERRL  Neck: Supple, [ ] JVD  Lungs: Equal air entry bilaterally; [ ] rales [ ] wheezing [ ] rhonchi  Heart: Irregular rate and rhythm; [x ] murmur   2/6 [ x] systolic [ ] diastolic [ ] radiation[ ] rubs [ ]  gallops  Abdomen: Nontender, bowel sounds present  Extremities: No clubbing, cyanosis, [ ] edema [ ]Pulses  equal and intact  Nervous system:  Alert & Oriented X3, no focal deficits  Psychiatric: Normal affect  Skin: No rashes or lesions    LABS:  02-24    135  |  94[L]  |  51[H]  ----------------------------<  173[H]  5.4[H]   |  28  |  5.72[H]    Ca    9.2      24 Feb 2025 00:39  Phos  4.0     02-24  Mg     2.7     02-24    TPro  6.6  /  Alb  3.0[L]  /  TBili  0.3  /  DBili  x   /  AST  13  /  ALT  9[L]  /  AlkPhos  101  02-24    Creatinine Trend: 5.72<--, 4.86<--, 3.85<--, 5.45<--, 4.63<--, 6.07<--                        7.8    10.53 )-----------( 131      ( 24 Feb 2025 00:39 )             24.8     PT/INR - ( 24 Feb 2025 06:29 )   PT: 14.2 sec;   INR: 1.24 ratio         PTT - ( 24 Feb 2025 06:29 )  PTT:86.0 sec       Xray Chest 1 View- PORTABLE-Urgent (Xray Chest 1 View- PORTABLE-Urgent .) (02.23.25 @ 13:13) >  COMPARISON: Chest x-ray 2/23/2025.    FINDINGS:  Enteric tube with tip looping inside the stomach.  Median sternotomy wires, sternal surgical clips.  The heart is not accurately assessed in this AP projection.  Decreased pulmonary vascular congestion.  Right lower lung opacity adjacent to the cardiophrenic angle.  There is no pneumothorax or large pleural effusion.  No acute bony abnormality.    IMPRESSION:  Pulmonary vascular congestion, decreased.  Right lowerlung opacity adjacent to the cardiophrenic angle.       TTE Limited W or WO Ultrasound Enhancing Agent (02.18.25 @ 13:54) >  CONCLUSIONS:      1. Left ventricular systolic function is normal.   2. Enlarged right ventricular cavity size and mildly reduced right ventricular systolic function.   3. Left pleural effusion noted.   4. Trace pericardial effusion.

## 2025-02-24 NOTE — PROGRESS NOTE ADULT - ASSESSMENT
54M with PMH of HTN, HLD, ESRD on HD MWF via LUE AVF, ascites (requiring repeat paracenteses q4-6wks), NICM with moderate LV dysfunction w/ EF 35-40%(2023) and CAD s/p atherectomy + SALLIE to D1(4/11/2024) presents to [facility] transferred from [facility] for CABG eval  Nephro on board for HD needs.    ESRD on HD   Nephrologist Dr. Bailey  [facility] Schedule / Access:  LUE AVF  Center: [facility]  Pt s/p HD on 2/19 and 2/21.  HD today  Keep MAP>65  Renal Diet  HD consent in chart     Hyper/Hypokalemia  Pt intermittently Hyperkalemia  HD as scheduled  now improved  Low K diet.  C/W Lokelma 10gm on nonHD days.  Monitor K.    HTN  BP fluctuating; controlled.  UF w/ HD as BP permits.  Low sodium diet.  Management per ICU  Monitor BP     CKD MBD  Check PTH   Low PO4 diet.  Not on binder.  Monitor Ca , PO4 daily     Anemia  CRISTIANE with HD  Repeat iron studies.  Transfuse if hgb <7    CAD with multivessel disease  CTICU following  s/p CABG 12/30    Hypoxic respiratory failure requiring Trach  Per surgery  S/p bronchoscopy, pulm following

## 2025-02-24 NOTE — PROGRESS NOTE ADULT - SUBJECTIVE AND OBJECTIVE BOX
[facility]    Dr. Nica Styles     Office [phone] (9 am to 5 pm)  Service: [phone] (5pm to 9am)  Also Available on TEAMS      RENAL PROGRESS NOTE: DATE OF SERVICE 02-24-25 @ 10:31    Patient is a 54y old  Male who presents with a chief complaint of CAD s/p CABG (24 Feb 2025 07:08)      Patient seen and examined at bedside. No chest pain/sob    VITALS:  T(F): 97.2 (02-24-25 @ 09:05), Max: 99 (02-23-25 @ 16:00)  HR: 56 (02-24-25 @ 09:05)  BP: 121/55 (02-24-25 @ 09:05)  RR: 20 (02-24-25 @ 09:05)  SpO2: 97% (02-24-25 @ 09:05)  Wt(kg): --    02-23 @ 07:01  -  02-24 @ 07:00  --------------------------------------------------------  IN: 2143.5 mL / OUT: 0 mL / NET: 2143.5 mL    02-24 @ 07:01  -  02-24 @ 10:31  --------------------------------------------------------  IN: 252 mL / OUT: 0 mL / NET: 252 mL          PHYSICAL EXAM:  Constitutional: NAD  Neck: No JVD  Respiratory: CTAB, no wheezes, rales or rhonchi  Cardiovascular: S1, S2, RRR  Gastrointestinal: BS+, soft, NT/ND  Extremities: No peripheral edema    Hospital Medications:   MEDICATIONS  (STANDING):  acetaminophen   IVPB .. 1000 milliGRAM(s) IV Intermittent once  aMIOdarone    Tablet 200 milliGRAM(s) Oral daily  ascorbic acid 500 milliGRAM(s) Oral daily  aspirin  chewable 81 milliGRAM(s) Oral daily  atorvastatin 80 milliGRAM(s) Oral at bedtime  chlorhexidine 0.12% Liquid 15 milliLiter(s) Oral Mucosa every 12 hours  chlorhexidine 2% Cloths 1 Application(s) Topical daily  chlorhexidine 4% Liquid 1 Application(s) Topical <User Schedule>  dextrose 50% Injectable 50 milliLiter(s) IV Push every 15 minutes  epoetin ignacia (EPOGEN) Injectable 44405 Unit(s) IV Push <User Schedule>  ferrous    sulfate Liquid 300 milliGRAM(s) Enteral Tube daily  heparin  Infusion 500 Unit(s)/Hr (14 mL/Hr) IV Continuous <Continuous>  insulin lispro (ADMELOG) corrective regimen sliding scale   SubCutaneous every 6 hours  melatonin 5 milliGRAM(s) Oral at bedtime  methocarbamol 500 milliGRAM(s) Oral every 8 hours  metoprolol tartrate 25 milliGRAM(s) Oral two times a day  mirtazapine 7.5 milliGRAM(s) Oral at bedtime  Nephro-elicia 1 Tablet(s) Oral daily  pantoprazole  Injectable 40 milliGRAM(s) IV Push daily  sodium chloride 0.65% Nasal 1 Spray(s) Both Nostrils every 12 hours  sodium chloride 0.9%. 1000 milliLiter(s) (10 mL/Hr) IV Continuous <Continuous>  sodium zirconium cyclosilicate 10 Gram(s) Oral <User Schedule>  traZODone 50 milliGRAM(s) Oral at bedtime      LABS:  02-24    135  |  94[L]  |  51[H]  ----------------------------<  173[H]  5.4[H]   |  28  |  5.72[H]    Ca    9.2      24 Feb 2025 00:39  Phos  4.0     02-24  Mg     2.7     02-24    TPro  6.6  /  Alb  3.0[L]  /  TBili  0.3  /  DBili      /  AST  13  /  ALT  9[L]  /  AlkPhos  101  02-24    Creatinine Trend: 5.72 <--, 4.86 <--, 3.85 <--, 5.45 <--, 4.63 <--, 6.07 <--, 5.01 <--    Albumin: 3.0 g/dL (02-24 @ 00:39)  Phosphorus: 4.0 mg/dL (02-24 @ 00:39)                              7.8    10.53 )-----------( 131      ( 24 Feb 2025 00:39 )             24.8     Urine Studies:  Urinalysis - [02-24-25 @ 00:39]      Color  / Appearance  / SG  / pH       Gluc 173 / Ketone   / Bili  / Urobili        Blood  / Protein  / Leuk Est  / Nitrite       RBC  / WBC  / Hyaline  / Gran  / Sq Epi  / Non Sq Epi  / Bacteria       Iron 29, TIBC 143, %sat 20      [12-19-24 @ 05:00]  Ferritin 904      [12-19-24 @ 05:00]  TSH 4.75      [12-24-24 @ 06:50]        RADIOLOGY & ADDITIONAL STUDIES:

## 2025-02-25 LAB
ALBUMIN SERPL ELPH-MCNC: 2.8 G/DL — LOW (ref 3.3–5)
ALP SERPL-CCNC: 94 U/L — SIGNIFICANT CHANGE UP (ref 40–120)
ALT FLD-CCNC: 10 U/L — SIGNIFICANT CHANGE UP (ref 10–45)
AMYLASE P1 CFR SERPL: 51 U/L — SIGNIFICANT CHANGE UP (ref 25–125)
ANION GAP SERPL CALC-SCNC: 12 MMOL/L — SIGNIFICANT CHANGE UP (ref 5–17)
APTT BLD: 45 SEC — HIGH (ref 24.5–35.6)
APTT BLD: 53.5 SEC — HIGH (ref 24.5–35.6)
APTT BLD: 54.6 SEC — HIGH (ref 24.5–35.6)
AST SERPL-CCNC: 14 U/L — SIGNIFICANT CHANGE UP (ref 10–40)
BASOPHILS # BLD AUTO: 0.05 K/UL — SIGNIFICANT CHANGE UP (ref 0–0.2)
BASOPHILS NFR BLD AUTO: 0.5 % — SIGNIFICANT CHANGE UP (ref 0–2)
BILIRUB SERPL-MCNC: 0.3 MG/DL — SIGNIFICANT CHANGE UP (ref 0.2–1.2)
BUN SERPL-MCNC: 34 MG/DL — HIGH (ref 7–23)
CALCIUM SERPL-MCNC: 9.1 MG/DL — SIGNIFICANT CHANGE UP (ref 8.4–10.5)
CHLORIDE SERPL-SCNC: 97 MMOL/L — SIGNIFICANT CHANGE UP (ref 96–108)
CO2 SERPL-SCNC: 27 MMOL/L — SIGNIFICANT CHANGE UP (ref 22–31)
CREAT SERPL-MCNC: 4.17 MG/DL — HIGH (ref 0.5–1.3)
EGFR: 16 ML/MIN/1.73M2 — LOW
EGFR: 16 ML/MIN/1.73M2 — LOW
EOSINOPHIL NFR BLD AUTO: 9.3 % — HIGH (ref 0–6)
GLUCOSE SERPL-MCNC: 111 MG/DL — HIGH (ref 70–99)
HCT VFR BLD CALC: 24.7 % — LOW (ref 39–50)
HGB BLD-MCNC: 7.6 G/DL — LOW (ref 13–17)
IMM GRANULOCYTES NFR BLD AUTO: 0.8 % — SIGNIFICANT CHANGE UP (ref 0–0.9)
LYMPHOCYTES # BLD AUTO: 0.76 K/UL — LOW (ref 1–3.3)
LYMPHOCYTES # BLD AUTO: 7.8 % — LOW (ref 13–44)
MAGNESIUM SERPL-MCNC: 2.4 MG/DL — SIGNIFICANT CHANGE UP (ref 1.6–2.6)
MCHC RBC-ENTMCNC: 30.5 PG — SIGNIFICANT CHANGE UP (ref 27–34)
MCHC RBC-ENTMCNC: 30.8 G/DL — LOW (ref 32–36)
MCV RBC AUTO: 99.2 FL — SIGNIFICANT CHANGE UP (ref 80–100)
MONOCYTES # BLD AUTO: 0.86 K/UL — SIGNIFICANT CHANGE UP (ref 0–0.9)
MONOCYTES NFR BLD AUTO: 8.8 % — SIGNIFICANT CHANGE UP (ref 2–14)
NEUTROPHILS # BLD AUTO: 7.1 K/UL — SIGNIFICANT CHANGE UP (ref 1.8–7.4)
NEUTROPHILS NFR BLD AUTO: 72.8 % — SIGNIFICANT CHANGE UP (ref 43–77)
PHOSPHATE SERPL-MCNC: 3.5 MG/DL — SIGNIFICANT CHANGE UP (ref 2.5–4.5)
PLATELET # BLD AUTO: 286 K/UL — SIGNIFICANT CHANGE UP (ref 150–400)
POTASSIUM SERPL-MCNC: 5 MMOL/L — SIGNIFICANT CHANGE UP (ref 3.5–5.3)
POTASSIUM SERPL-SCNC: 5 MMOL/L — SIGNIFICANT CHANGE UP (ref 3.5–5.3)
PROT SERPL-MCNC: 6.4 G/DL — SIGNIFICANT CHANGE UP (ref 6–8.3)
RBC # BLD: 2.49 M/UL — LOW (ref 4.2–5.8)
RBC # FLD: 22.2 % — HIGH (ref 10.3–14.5)
SODIUM SERPL-SCNC: 136 MMOL/L — SIGNIFICANT CHANGE UP (ref 135–145)
WBC # BLD: 9.76 K/UL — SIGNIFICANT CHANGE UP (ref 3.8–10.5)
WBC # FLD AUTO: 9.76 K/UL — SIGNIFICANT CHANGE UP (ref 3.8–10.5)

## 2025-02-25 PROCEDURE — 99291 CRITICAL CARE FIRST HOUR: CPT

## 2025-02-25 PROCEDURE — 71045 X-RAY EXAM CHEST 1 VIEW: CPT | Mod: 26,76

## 2025-02-25 PROCEDURE — 76705 ECHO EXAM OF ABDOMEN: CPT | Mod: 26

## 2025-02-25 PROCEDURE — 74018 RADEX ABDOMEN 1 VIEW: CPT | Mod: 26

## 2025-02-25 RX ORDER — OXYCODONE HYDROCHLORIDE 30 MG/1
10 TABLET ORAL ONCE
Refills: 0 | Status: DISCONTINUED | OUTPATIENT
Start: 2025-02-25 | End: 2025-02-25

## 2025-02-25 RX ORDER — ONDANSETRON HCL/PF 4 MG/2 ML
4 VIAL (ML) INJECTION ONCE
Refills: 0 | Status: COMPLETED | OUTPATIENT
Start: 2025-02-25 | End: 2025-02-25

## 2025-02-25 RX ORDER — ACETAMINOPHEN 500 MG/5ML
1000 LIQUID (ML) ORAL ONCE
Refills: 0 | Status: COMPLETED | OUTPATIENT
Start: 2025-02-25 | End: 2025-02-25

## 2025-02-25 RX ORDER — HEPARIN SODIUM 1000 [USP'U]/ML
300 INJECTION INTRAVENOUS; SUBCUTANEOUS
Qty: 25000 | Refills: 0 | Status: DISCONTINUED | OUTPATIENT
Start: 2025-02-25 | End: 2025-02-25

## 2025-02-25 RX ORDER — HEPARIN SODIUM 1000 [USP'U]/ML
1400 INJECTION INTRAVENOUS; SUBCUTANEOUS
Qty: 25000 | Refills: 0 | Status: DISCONTINUED | OUTPATIENT
Start: 2025-02-25 | End: 2025-02-26

## 2025-02-25 RX ADMIN — Medication 500 MILLIGRAM(S): at 12:20

## 2025-02-25 RX ADMIN — ATORVASTATIN CALCIUM 80 MILLIGRAM(S): 80 TABLET, FILM COATED ORAL at 21:01

## 2025-02-25 RX ADMIN — Medication 1 APPLICATION(S): at 05:05

## 2025-02-25 RX ADMIN — Medication 1000 MILLIGRAM(S): at 16:20

## 2025-02-25 RX ADMIN — OXYCODONE HYDROCHLORIDE 10 MILLIGRAM(S): 30 TABLET ORAL at 19:22

## 2025-02-25 RX ADMIN — Medication 50 MILLIGRAM(S): at 21:02

## 2025-02-25 RX ADMIN — Medication 1 APPLICATION(S): at 21:24

## 2025-02-25 RX ADMIN — METHOCARBAMOL 500 MILLIGRAM(S): 500 TABLET, FILM COATED ORAL at 16:29

## 2025-02-25 RX ADMIN — AMIODARONE HYDROCHLORIDE 200 MILLIGRAM(S): 50 INJECTION, SOLUTION INTRAVENOUS at 06:00

## 2025-02-25 RX ADMIN — Medication 4 MILLIGRAM(S): at 09:30

## 2025-02-25 RX ADMIN — Medication 5 MILLIGRAM(S): at 21:02

## 2025-02-25 RX ADMIN — Medication 300 MILLIGRAM(S): at 16:29

## 2025-02-25 RX ADMIN — METHOCARBAMOL 500 MILLIGRAM(S): 500 TABLET, FILM COATED ORAL at 06:01

## 2025-02-25 RX ADMIN — HEPARIN SODIUM 14 UNIT(S)/HR: 1000 INJECTION INTRAVENOUS; SUBCUTANEOUS at 21:10

## 2025-02-25 RX ADMIN — HEPARIN SODIUM 14 UNIT(S)/HR: 1000 INJECTION INTRAVENOUS; SUBCUTANEOUS at 16:29

## 2025-02-25 RX ADMIN — Medication 400 MILLIGRAM(S): at 07:07

## 2025-02-25 RX ADMIN — Medication 40 MILLIGRAM(S): at 12:21

## 2025-02-25 RX ADMIN — METHOCARBAMOL 500 MILLIGRAM(S): 500 TABLET, FILM COATED ORAL at 21:01

## 2025-02-25 RX ADMIN — Medication 1 TABLET(S): at 12:20

## 2025-02-25 RX ADMIN — MIRTAZAPINE 7.5 MILLIGRAM(S): 30 TABLET, FILM COATED ORAL at 21:01

## 2025-02-25 RX ADMIN — OXYCODONE HYDROCHLORIDE 10 MILLIGRAM(S): 30 TABLET ORAL at 13:00

## 2025-02-25 RX ADMIN — Medication 81 MILLIGRAM(S): at 12:21

## 2025-02-25 RX ADMIN — OXYCODONE HYDROCHLORIDE 10 MILLIGRAM(S): 30 TABLET ORAL at 18:52

## 2025-02-25 RX ADMIN — METOPROLOL SUCCINATE 25 MILLIGRAM(S): 50 TABLET, EXTENDED RELEASE ORAL at 18:19

## 2025-02-25 RX ADMIN — SODIUM ZIRCONIUM CYCLOSILICATE 10 GRAM(S): 5 POWDER, FOR SUSPENSION ORAL at 06:00

## 2025-02-25 RX ADMIN — METOPROLOL SUCCINATE 25 MILLIGRAM(S): 50 TABLET, EXTENDED RELEASE ORAL at 06:01

## 2025-02-25 RX ADMIN — OXYCODONE HYDROCHLORIDE 10 MILLIGRAM(S): 30 TABLET ORAL at 12:20

## 2025-02-25 NOTE — PROGRESS NOTE ADULT - ASSESSMENT
54M with PMH of HTN, HLD, ESRD on HD MWF via LUE AVF, ascites (requiring repeat paracenteses q4-6wks), NICM with moderate LV dysfunction w/ EF 35-40%(2023) and CAD s/p atherectomy + SALLIE to D1(4/11/2024) presents to [facility] transferred from [facility] for CABG eval  Nephro on board for HD needs.    ESRD on HD   Nephrologist Dr. Bailey  [facility] Schedule / Access:  LUE AVF  Center: [facility]  Pt s/p HD on 2/19 and 2/21.  s/p HD 2/24 uf 1.5L  pt refusing pUF today; HD tomorrow  Keep MAP>65  Renal Diet  HD consent in chart     Hyper/Hypokalemia  Pt intermittently Hyperkalemia  HD as scheduled  now improved  Low K diet.  C/W Lokelma 10gm on nonHD days.  Monitor K.    HTN  BP fluctuating; controlled.  UF w/ HD as BP permits.  Low sodium diet.  Management per ICU  Monitor BP     CKD MBD  Check PTH   Low PO4 diet.  Not on binder.  Monitor Ca , PO4 daily     Anemia  CRISTIANE with HD  Repeat iron studies.  Transfuse if hgb <7    CAD with multivessel disease  CTICU following  s/p CABG 12/30    Hypoxic respiratory failure requiring Trach  Per surgery  S/p bronchoscopy, pulm following

## 2025-02-25 NOTE — PROGRESS NOTE ADULT - SUBJECTIVE AND OBJECTIVE BOX
[facility]   MD DREW VIZCARRA MD SAKIL BHUIYAN, MD MARIA SANTIAGO, JAX    TEL:  OFFICE: [phone]  From 5pm-7am Answering Service [phone]    -- RENAL FOLLOW UP NOTE ---Date of Service 02-25-25 @ 13:27    Patient is a 54y old  Male who presents with a chief complaint of CAD s/p CABG       Patient seen and examined at bedside. Patient has a trach in no apparent distress.    VITALS:  T(F): 98 (02-25-25 @ 12:00), Max: 98.8 (02-25-25 @ 08:00)  HR: 82 (02-25-25 @ 13:00)  BP: 176/80 (02-25-25 @ 12:00)  RR: 13 (02-25-25 @ 13:00)  SpO2: 100% (02-25-25 @ 13:00)  Wt(kg): --    02-24 @ 07:01  -  02-25 @ 07:00  --------------------------------------------------------  IN: 1392 mL / OUT: 1501 mL / NET: -109 mL    02-25 @ 07:01  -  02-25 @ 13:27  --------------------------------------------------------  IN: 144 mL / OUT: 0 mL / NET: 144 mL          PHYSICAL EXAM:  General: NAD  Neck: No JVD  Respiratory: +trach collar  Cardiovascular: S1, S2, RRR  Gastrointestinal: BS+, soft, NT/ND; on tube feeds  Extremities: No peripheral edema      Hospital Medications:   MEDICATIONS  (STANDING):  aMIOdarone    Tablet 200 milliGRAM(s) Oral daily  ascorbic acid 500 milliGRAM(s) Oral daily  aspirin  chewable 81 milliGRAM(s) Oral daily  atorvastatin 80 milliGRAM(s) Oral at bedtime  chlorhexidine 2% Cloths 1 Application(s) Topical daily  chlorhexidine 4% Liquid 1 Application(s) Topical <User Schedule>  dextrose 50% Injectable 50 milliLiter(s) IV Push every 15 minutes  epoetin ignacia (EPOGEN) Injectable 37718 Unit(s) IV Push <User Schedule>  ferrous    sulfate Liquid 300 milliGRAM(s) Enteral Tube daily  heparin  Infusion 1400 Unit(s)/Hr (14 mL/Hr) IV Continuous <Continuous>  insulin lispro (ADMELOG) corrective regimen sliding scale   SubCutaneous every 6 hours  melatonin 5 milliGRAM(s) Oral at bedtime  methocarbamol 500 milliGRAM(s) Oral every 8 hours  metoprolol tartrate 25 milliGRAM(s) Oral two times a day  mirtazapine 7.5 milliGRAM(s) Oral at bedtime  Nephro-elicia 1 Tablet(s) Oral daily  pantoprazole  Injectable 40 milliGRAM(s) IV Push daily  sodium chloride 0.65% Nasal 1 Spray(s) Both Nostrils every 12 hours  sodium chloride 0.9%. 1000 milliLiter(s) (10 mL/Hr) IV Continuous <Continuous>  sodium zirconium cyclosilicate 10 Gram(s) Oral <User Schedule>  traZODone 50 milliGRAM(s) Oral at bedtime      LABS:  02-25    136  |  97  |  34[H]  ----------------------------<  111[H]  5.0   |  27  |  4.17[H]    Ca    9.1      25 Feb 2025 00:53  Phos  3.5     02-25  Mg     2.4     02-25    TPro  6.4  /  Alb  2.8[L]  /  TBili  0.3  /  DBili      /  AST  14  /  ALT  10  /  AlkPhos  94  02-25    Creatinine Trend: 4.17 <--, 5.72 <--, 4.86 <--, 3.85 <--, 5.45 <--, 4.63 <--, 6.07 <--    Albumin: 2.8 g/dL (02-25 @ 00:53)  Phosphorus: 3.5 mg/dL (02-25 @ 00:53)                              7.6    9.76  )-----------( 286      ( 25 Feb 2025 00:53 )             24.7     Urine Studies:  Urinalysis - [02-25-25 @ 00:53]      Color  / Appearance  / SG  / pH       Gluc 111 / Ketone   / Bili  / Urobili        Blood  / Protein  / Leuk Est  / Nitrite       RBC  / WBC  / Hyaline  / Gran  / Sq Epi  / Non Sq Epi  / Bacteria       Iron 29, TIBC 143, %sat 20      [12-19-24 @ 05:00]  Ferritin 904      [12-19-24 @ 05:00]  TSH 4.75      [12-24-24 @ 06:50]        RADIOLOGY & ADDITIONAL STUDIES:

## 2025-02-25 NOTE — PROGRESS NOTE ADULT - NS ATTEND AMEND GEN_ALL_CORE FT
HD TODAY
HD on MONDAY
TAMANNA TOMORROW
Patient discussed in details, S/p HD overnight post CABG, HD today per holiday scheduele
Patient discussed in details, plan as above
as above
HD today
TAMANNA tomorrow
Patient discussed in details, plan as above
Patient discussed in details, plan as above, HD tomm
Transient vagal hypotension/bradycardia without pauses. Brief dip in heart rates to 40s-50s bpm. No indication for pacing. On theophylline.
Transient vagal hypotension/bradycardia without pauses. Brief dip in heart rates to 40s-50s bpm. No indication for pacing. Theophylline started.
refusing HD today-no emergent indication will plan for HD tomorrow. Please call us if anything changes
patient seen - discussed his findings regarding the SNIFF test and plan for OR tomorrow for robotic likely open diaphragm plication. patient expressed concern about "snowball effect" and surgery leading to other issues. I expressed all surgical procedures carry risk but goal was to try and improve his respiratory status. NPO after midnight in anticipation for OR tomorrow if team agreeable.

## 2025-02-25 NOTE — PROGRESS NOTE ADULT - NS ATTEND OPT1 GEN_ALL_CORE

## 2025-02-25 NOTE — PROGRESS NOTE ADULT - SUBJECTIVE AND OBJECTIVE BOX
Patient seen and examined at the bedside.    Remains critically ill on continuous ICU monitoring, at risk for life threatening decompensation.  All Labs, data reviewed. Plan of care discussed in length during multi-disciplinary ICU rounds.       Brief Summary:  55 yo M with multiple medical problems including CAD s/p PCI in April 2024 s/p CABG x 3 on 12/30/24  1/2: Intubated for hypoxia & left lung white out s/p awake bronch with removal of copious mucopurulent secretions  1/4: s/p IABP insertion, sepsis/septic shock due to E coli   ESBL pneumonia, collapsed Left Lung, s/p multiple bronch   2/6: bronch today off positive pressure. 2/1: MRSA and serratia from cyst on the back. Plan for Levaquin + Vanc x 5 days  2/8 : concern for left food drop   2/10 : no acute issues - CXR shows improving left sided aeration, pt subjectively reports having difficulty while lying flat  2/11: s/p Paracentesis 3.5 L removed, leukocytosis   2/12: Ascites fluid PMN < 250 (less likely SBP)   sniff test yesterday showed paradoxical diaphragmatic motion    2/13: mucus plugging but able to clear airway with aggressive pulmonary toileting.   2/14: no vent requirement overnight, able to mobilize secretion with pulm toileting  hyperkalemia post HD - workup for possible recirculation underway   2/16: On TC X 48 hours - ambulating well, no mucus plugging events  2/22: HFTC x24hrs (40L 30%)    24 Hour events:  no acute events overnight  last iHD session 2/24  tolerated TC for > 48hrs  chest PT. ambulate    Objective:  Vital Signs Last 24 Hrs  T(C): 37.1 (25 Feb 2025 00:00), Max: 37.1 (25 Feb 2025 00:00)  T(F): 98.7 (25 Feb 2025 00:00), Max: 98.7 (25 Feb 2025 00:00)  HR: 105 (25 Feb 2025 07:02) (56 - 110)  BP: 145/54 (25 Feb 2025 06:00) (98/57 - 153/65)  BP(mean): 78 (25 Feb 2025 06:00) (62 - 102)  RR: 22 (25 Feb 2025 07:02) (13 - 22)  SpO2: 96% (25 Feb 2025 07:02) (95% - 100%)    Parameters below as of 25 Feb 2025 07:02  Patient On (Oxygen Delivery Method): tracheostomy collar    O2 Concentration (%): 40    Mode: off  FiO2: 50    Physical Exam:  General: Alert and interactive  Neurology: Oriented, following commands  Respiratory: Clear bilaterally, Trach site ok.  CV: AFib  Abdominal: Soft, Nontender  Extremities: Warm, well-perfused  -------------------------------------------------------------------------------------------------------------------------------    Labs:                        7.6    9.76  )-----------( 286      ( 25 Feb 2025 00:53 )             24.7     02-25    136  |  97  |  34[H]  ----------------------------<  111[H]  5.0   |  27  |  4.17[H]    Ca    9.1      25 Feb 2025 00:53  Phos  3.5     02-25  Mg     2.4     02-25    TPro  6.4  /  Alb  2.8[L]  /  TBili  0.3  /  DBili  x   /  AST  14  /  ALT  10  /  AlkPhos  94  02-25    LIVER FUNCTIONS - ( 25 Feb 2025 00:53 )  Alb: 2.8 g/dL / Pro: 6.4 g/dL / ALK PHOS: 94 U/L / ALT: 10 U/L / AST: 14 U/L / GGT: x           PT/INR - ( 24 Feb 2025 06:29 )   PT: 14.2 sec;   INR: 1.24 ratio       PTT - ( 25 Feb 2025 00:54 )  PTT:45.0 sec    ALL MEDICATIONS   MEDICATIONS  (STANDING):  aMIOdarone    Tablet 200 milliGRAM(s) Oral daily  ascorbic acid 500 milliGRAM(s) Oral daily  aspirin  chewable 81 milliGRAM(s) Oral daily  atorvastatin 80 milliGRAM(s) Oral at bedtime  chlorhexidine 2% Cloths 1 Application(s) Topical daily  chlorhexidine 4% Liquid 1 Application(s) Topical <User Schedule>  dextrose 50% Injectable 50 milliLiter(s) IV Push every 15 minutes  epoetin ignacia (EPOGEN) Injectable 07282 Unit(s) IV Push <User Schedule>  ferrous    sulfate Liquid 300 milliGRAM(s) Enteral Tube daily  heparin  Infusion 500 Unit(s)/Hr (14 mL/Hr) IV Continuous <Continuous>  insulin lispro (ADMELOG) corrective regimen sliding scale   SubCutaneous every 6 hours  melatonin 5 milliGRAM(s) Oral at bedtime  methocarbamol 500 milliGRAM(s) Oral every 8 hours  metoprolol tartrate 25 milliGRAM(s) Oral two times a day  mirtazapine 7.5 milliGRAM(s) Oral at bedtime  Nephro-elicia 1 Tablet(s) Oral daily  pantoprazole  Injectable 40 milliGRAM(s) IV Push daily  sodium chloride 0.65% Nasal 1 Spray(s) Both Nostrils every 12 hours  sodium chloride 0.9%. 1000 milliLiter(s) (10 mL/Hr) IV Continuous <Continuous>  sodium zirconium cyclosilicate 10 Gram(s) Oral <User Schedule>  traZODone 50 milliGRAM(s) Oral at bedtime    MEDICATIONS  (PRN):  acetaminophen     Tablet .. 650 milliGRAM(s) Oral every 6 hours PRN Mild Pain (1 - 3)  lidocaine/prilocaine Cream 1 Application(s) Topical daily PRN pre-HD  sodium chloride 0.9% lock flush 10 milliLiter(s) IV Push every 1 hour PRN Pre/post blood products, medications, blood draw, and to maintain line patency  ------------------------------------------------------------------------------------------------------------------------------  Assessment:    55 yo M s/p CABG x 3 on 12/30/24    Postop acute respiratory failure  Acute blood loss anemia  ESRD on iHD   hx of ESBL   hx of VRE  Serratia infection  MSRA infection   sacral decub     Plan:   ***Neuro***  Postoperative acute pain control with Robaxin and Tylenol  Trazodone, Remeron, and Melatonin nightly  PT ongoing    ***Cardiovascular***  s/p CABG x 3  A flutter - Tolerating beta blocker, PO Amio for rate control  ASA / Statin daily    ***Pulmonary***  Postoperative acute respiratory insufficiency - HFTC   Tracheostomy 1/18   Mucomyst, normal saline nebs, Xopenex to help mobilize secretions  Left lung PNA, + e coli ESBL - cleared but now with VRE   Needs aggressive pulm toileting   Bronchoscopy 2/18 for recurrent AUGUSTO opacification     ***GI***  Tolerating Tube feeds at goal  Protonix for stress ulcer prophylaxis.     ***Renal***  ESRD - tolerating iHD  Nephro-Elicia for renal support    ***ID***  No active infection - off antibiotics   Monitor cultures  1/28 BAL - commensal manjit   1/24 VRE BAL - Linezolid   1/24 Acyclovir for + HSV in BAL  1/26 Serratia on tissue cx and h/o ESBL PNA   2/1: MRSA and serratia from cyst on the back. Levaquin + Vanco, completed course  2/28 BAL commensal manjit     ***Endocrine***  Hyperglycemia - ISS    ***Hematology***  Acute blood loss anemia - no transfusion indication currently.  CBC, coags  Continue Epogen.  Heparin infusion (AF)         I, Stella Hodges MD, personally performed the services described in this documentation. All medical record entries made by the scribe were at my direction and in my presence. I have reviewed the chart and agree that the record reflects my personal performance and is accurate and complete  Electronically signed:   Stella Hodges MD  CT ICU attending     ICU time: ** mins     By signing my name below, I, Baldemar Hernandez, attest that this documentation has been prepared under the direction and in the presence of Stella Hodges MD  Electronically signed: Baldemar Hernandez, 02-25-25 @ 07:44             Patient seen and examined at the bedside.    Remains critically ill on continuous ICU monitoring, at risk for life threatening decompensation.  All Labs, data reviewed. Plan of care discussed in length during multi-disciplinary ICU rounds.       Brief Summary:  55 yo M with multiple medical problems including CAD s/p PCI in April 2024 s/p CABG x 3 on 12/30/24  1/2: Intubated for hypoxia & left lung white out s/p awake bronch with removal of copious mucopurulent secretions  1/4: s/p IABP insertion, sepsis/septic shock due to E coli   ESBL pneumonia, collapsed Left Lung, s/p multiple bronch   2/6: bronch today off positive pressure. 2/1: MRSA and serratia from cyst on the back. Plan for Levaquin + Vanc x 5 days  2/8 : concern for left food drop   2/10 : no acute issues - CXR shows improving left sided aeration, pt subjectively reports having difficulty while lying flat  2/11: s/p Paracentesis 3.5 L removed, leukocytosis   2/12: Ascites fluid PMN < 250 (less likely SBP)   sniff test yesterday showed paradoxical diaphragmatic motion    2/13: mucus plugging but able to clear airway with aggressive pulmonary toileting.   2/14: no vent requirement overnight, able to mobilize secretion with pulm toileting  hyperkalemia post HD - workup for possible recirculation underway   2/16: On TC X 48 hours - ambulating well, no mucus plugging events  2/22: HFTC x24hrs (40L 30%)  2/24: iHD 1.5L UF    24 Hour events:  AUGUSTO collapse improved w/ chest PT  last iHD session 2/24 : 1.5L UF  reports nausea, will get Abd US, amylase, lipase, zofran, KUB  iHD today for volume removal  tolerated TC for since 2/22.   chest PT. ambulate    Objective:  Vital Signs Last 24 Hrs  T(C): 37.1 (25 Feb 2025 00:00), Max: 37.1 (25 Feb 2025 00:00)  T(F): 98.7 (25 Feb 2025 00:00), Max: 98.7 (25 Feb 2025 00:00)  HR: 105 (25 Feb 2025 07:02) (56 - 110)  BP: 145/54 (25 Feb 2025 06:00) (98/57 - 153/65)  BP(mean): 78 (25 Feb 2025 06:00) (62 - 102)  RR: 22 (25 Feb 2025 07:02) (13 - 22)  SpO2: 96% (25 Feb 2025 07:02) (95% - 100%)    Parameters below as of 25 Feb 2025 07:02  Patient On (Oxygen Delivery Method): tracheostomy collar    O2 Concentration (%): 40    Mode: off  FiO2: 50    Physical Exam:  General: Alert and interactive  Neurology: Oriented, following commands  Respiratory: Clear bilaterally, Trach site ok.  CV: AFib  Abdominal: Soft, Nontender  Extremities: Warm, well-perfused  -------------------------------------------------------------------------------------------------------------------------------    Labs:                        7.6    9.76  )-----------( 286      ( 25 Feb 2025 00:53 )             24.7     02-25    136  |  97  |  34[H]  ----------------------------<  111[H]  5.0   |  27  |  4.17[H]    Ca    9.1      25 Feb 2025 00:53  Phos  3.5     02-25  Mg     2.4     02-25    TPro  6.4  /  Alb  2.8[L]  /  TBili  0.3  /  DBili  x   /  AST  14  /  ALT  10  /  AlkPhos  94  02-25    LIVER FUNCTIONS - ( 25 Feb 2025 00:53 )  Alb: 2.8 g/dL / Pro: 6.4 g/dL / ALK PHOS: 94 U/L / ALT: 10 U/L / AST: 14 U/L / GGT: x           PT/INR - ( 24 Feb 2025 06:29 )   PT: 14.2 sec;   INR: 1.24 ratio       PTT - ( 25 Feb 2025 00:54 )  PTT:45.0 sec    ALL MEDICATIONS   MEDICATIONS  (STANDING):  aMIOdarone    Tablet 200 milliGRAM(s) Oral daily  ascorbic acid 500 milliGRAM(s) Oral daily  aspirin  chewable 81 milliGRAM(s) Oral daily  atorvastatin 80 milliGRAM(s) Oral at bedtime  chlorhexidine 2% Cloths 1 Application(s) Topical daily  chlorhexidine 4% Liquid 1 Application(s) Topical <User Schedule>  dextrose 50% Injectable 50 milliLiter(s) IV Push every 15 minutes  epoetin ignacia (EPOGEN) Injectable 41321 Unit(s) IV Push <User Schedule>  ferrous    sulfate Liquid 300 milliGRAM(s) Enteral Tube daily  heparin  Infusion 500 Unit(s)/Hr (14 mL/Hr) IV Continuous <Continuous>  insulin lispro (ADMELOG) corrective regimen sliding scale   SubCutaneous every 6 hours  melatonin 5 milliGRAM(s) Oral at bedtime  methocarbamol 500 milliGRAM(s) Oral every 8 hours  metoprolol tartrate 25 milliGRAM(s) Oral two times a day  mirtazapine 7.5 milliGRAM(s) Oral at bedtime  Nephro-elicia 1 Tablet(s) Oral daily  pantoprazole  Injectable 40 milliGRAM(s) IV Push daily  sodium chloride 0.65% Nasal 1 Spray(s) Both Nostrils every 12 hours  sodium chloride 0.9%. 1000 milliLiter(s) (10 mL/Hr) IV Continuous <Continuous>  sodium zirconium cyclosilicate 10 Gram(s) Oral <User Schedule>  traZODone 50 milliGRAM(s) Oral at bedtime    MEDICATIONS  (PRN):  acetaminophen     Tablet .. 650 milliGRAM(s) Oral every 6 hours PRN Mild Pain (1 - 3)  lidocaine/prilocaine Cream 1 Application(s) Topical daily PRN pre-HD  sodium chloride 0.9% lock flush 10 milliLiter(s) IV Push every 1 hour PRN Pre/post blood products, medications, blood draw, and to maintain line patency  ------------------------------------------------------------------------------------------------------------------------------  Assessment:    55 yo M s/p CABG x 3 on 12/30/24    Postop acute respiratory failure  Acute blood loss anemia  ESRD on iHD   hx of ESBL   hx of VRE  Serratia infection  MSRA infection   sacral decub     Plan:   ***Neuro***  Postoperative acute pain control with Robaxin and Tylenol  Trazodone, Remeron, and Melatonin nightly  PT ongoing    ***Cardiovascular***  s/p CABG x 3  A flutter - Tolerating beta blocker, PO Amio for rate control  ASA / Statin daily    ***Pulmonary***  Postoperative acute respiratory insufficiency - HFTC   Tracheostomy 1/18   Mucomyst, normal saline nebs, Xopenex to help mobilize secretions  Left lung PNA, + e coli ESBL - cleared but now with VRE   Needs aggressive pulm toileting   Bronchoscopy 2/18 for recurrent AUGUSTO opacification     ***GI***  Tolerating Tube feeds at goal  Protonix for stress ulcer prophylaxis.     ***Renal***  ESRD - tolerating iHD  Nephro-Elicia for renal support    ***ID***  No active infection - off antibiotics   Monitor cultures  1/28 BAL - commensal manjit   1/24 VRE BAL - Linezolid   1/24 Acyclovir for + HSV in BAL  1/26 Serratia on tissue cx and h/o ESBL PNA   2/1: MRSA and serratia from cyst on the back. Levaquin + Vanco, completed course  2/28 BAL commensal manjit     ***Endocrine***  Hyperglycemia - ISS    ***Hematology***  Acute blood loss anemia - no transfusion indication currently.  CBC, coags  Continue Epogen.  Heparin infusion (AF)         I, Stella Hodges MD, personally performed the services described in this documentation. All medical record entries made by the scribe were at my direction and in my presence. I have reviewed the chart and agree that the record reflects my personal performance and is accurate and complete  Electronically signed:   Stella Hodges MD  CT ICU attending     ICU time: ** mins     By signing my name below, I, Baldemar Hernandez, attest that this documentation has been prepared under the direction and in the presence of Stella Hodges MD  Electronically signed: Baldemar Hernandez, 02-25-25 @ 07:44

## 2025-02-26 LAB
ALBUMIN SERPL ELPH-MCNC: 2.8 G/DL — LOW (ref 3.3–5)
ALT FLD-CCNC: 9 U/L — LOW (ref 10–45)
ANION GAP SERPL CALC-SCNC: 12 MMOL/L — SIGNIFICANT CHANGE UP (ref 5–17)
APTT BLD: 38.4 SEC — HIGH (ref 24.5–35.6)
APTT BLD: 48.7 SEC — HIGH (ref 24.5–35.6)
APTT BLD: 73.6 SEC — HIGH (ref 24.5–35.6)
APTT BLD: 80.6 SEC — HIGH (ref 24.5–35.6)
AST SERPL-CCNC: 13 U/L — SIGNIFICANT CHANGE UP (ref 10–40)
BASOPHILS # BLD AUTO: 0.04 K/UL — SIGNIFICANT CHANGE UP (ref 0–0.2)
BASOPHILS # BLD AUTO: 0.05 K/UL — SIGNIFICANT CHANGE UP (ref 0–0.2)
BASOPHILS NFR BLD AUTO: 0.5 % — SIGNIFICANT CHANGE UP (ref 0–2)
BASOPHILS NFR BLD AUTO: 0.5 % — SIGNIFICANT CHANGE UP (ref 0–2)
BILIRUB SERPL-MCNC: 0.4 MG/DL — SIGNIFICANT CHANGE UP (ref 0.2–1.2)
BUN SERPL-MCNC: 42 MG/DL — HIGH (ref 7–23)
CALCIUM SERPL-MCNC: 9.4 MG/DL — SIGNIFICANT CHANGE UP (ref 8.4–10.5)
CHLORIDE SERPL-SCNC: 95 MMOL/L — LOW (ref 96–108)
CO2 SERPL-SCNC: 27 MMOL/L — SIGNIFICANT CHANGE UP (ref 22–31)
CREAT SERPL-MCNC: 5.17 MG/DL — HIGH (ref 0.5–1.3)
CULTURE RESULTS: SIGNIFICANT CHANGE UP
EGFR: 12 ML/MIN/1.73M2 — LOW
EGFR: 12 ML/MIN/1.73M2 — LOW
EOSINOPHIL # BLD AUTO: 0.93 K/UL — HIGH (ref 0–0.5)
EOSINOPHIL # BLD AUTO: 1.11 K/UL — HIGH (ref 0–0.5)
EOSINOPHIL NFR BLD AUTO: 10.6 % — HIGH (ref 0–6)
EOSINOPHIL NFR BLD AUTO: 11 % — HIGH (ref 0–6)
GLUCOSE SERPL-MCNC: 124 MG/DL — HIGH (ref 70–99)
HCT VFR BLD CALC: 24.2 % — LOW (ref 39–50)
HCT VFR BLD CALC: 24.2 % — LOW (ref 39–50)
HGB BLD-MCNC: 7.6 G/DL — LOW (ref 13–17)
HGB BLD-MCNC: 7.6 G/DL — LOW (ref 13–17)
IMM GRANULOCYTES NFR BLD AUTO: 1.5 % — HIGH (ref 0–0.9)
INR BLD: 1.25 RATIO — HIGH (ref 0.85–1.16)
LYMPHOCYTES # BLD AUTO: 0.67 K/UL — LOW (ref 1–3.3)
LYMPHOCYTES # BLD AUTO: 0.89 K/UL — LOW (ref 1–3.3)
LYMPHOCYTES # BLD AUTO: 7.6 % — LOW (ref 13–44)
LYMPHOCYTES # BLD AUTO: 8.8 % — LOW (ref 13–44)
MAGNESIUM SERPL-MCNC: 2.7 MG/DL — HIGH (ref 1.6–2.6)
MCHC RBC-ENTMCNC: 31.4 G/DL — LOW (ref 32–36)
MCHC RBC-ENTMCNC: 31.4 G/DL — LOW (ref 32–36)
MCHC RBC-ENTMCNC: 31.7 PG — SIGNIFICANT CHANGE UP (ref 27–34)
MCHC RBC-ENTMCNC: 31.7 PG — SIGNIFICANT CHANGE UP (ref 27–34)
MCV RBC AUTO: 100.8 FL — HIGH (ref 80–100)
MCV RBC AUTO: 100.8 FL — HIGH (ref 80–100)
MONOCYTES # BLD AUTO: 1.06 K/UL — HIGH (ref 0–0.9)
MONOCYTES NFR BLD AUTO: 10.5 % — SIGNIFICANT CHANGE UP (ref 2–14)
MONOCYTES NFR BLD AUTO: 10.6 % — SIGNIFICANT CHANGE UP (ref 2–14)
NEUTROPHILS # BLD AUTO: 6.08 K/UL — SIGNIFICANT CHANGE UP (ref 1.8–7.4)
NEUTROPHILS # BLD AUTO: 6.85 K/UL — SIGNIFICANT CHANGE UP (ref 1.8–7.4)
NEUTROPHILS NFR BLD AUTO: 68 % — SIGNIFICANT CHANGE UP (ref 43–77)
NEUTROPHILS NFR BLD AUTO: 69.2 % — SIGNIFICANT CHANGE UP (ref 43–77)
NRBC BLD AUTO-RTO: 0 /100 WBCS — SIGNIFICANT CHANGE UP (ref 0–0)
NRBC BLD AUTO-RTO: 0 /100 WBCS — SIGNIFICANT CHANGE UP (ref 0–0)
PHOSPHATE SERPL-MCNC: 4.6 MG/DL — HIGH (ref 2.5–4.5)
PLATELET # BLD AUTO: 266 K/UL — SIGNIFICANT CHANGE UP (ref 150–400)
PLATELET # BLD AUTO: 272 K/UL — SIGNIFICANT CHANGE UP (ref 150–400)
POTASSIUM SERPL-MCNC: 5.7 MMOL/L — HIGH (ref 3.5–5.3)
POTASSIUM SERPL-SCNC: 5.7 MMOL/L — HIGH (ref 3.5–5.3)
PROT SERPL-MCNC: 6.5 G/DL — SIGNIFICANT CHANGE UP (ref 6–8.3)
PROTHROM AB SERPL-ACNC: 14.1 SEC — HIGH (ref 9.9–13.4)
PROTHROM AB SERPL-ACNC: 14.4 SEC — HIGH (ref 9.9–13.4)
RBC # BLD: 2.4 M/UL — LOW (ref 4.2–5.8)
RBC # BLD: 2.4 M/UL — LOW (ref 4.2–5.8)
RBC # FLD: 21.8 % — HIGH (ref 10.3–14.5)
RBC # FLD: 21.9 % — HIGH (ref 10.3–14.5)
SODIUM SERPL-SCNC: 134 MMOL/L — LOW (ref 135–145)
SPECIMEN SOURCE: SIGNIFICANT CHANGE UP
WBC # BLD: 10.08 K/UL — SIGNIFICANT CHANGE UP (ref 3.8–10.5)
WBC # BLD: 8.78 K/UL — SIGNIFICANT CHANGE UP (ref 3.8–10.5)
WBC # FLD AUTO: 10.08 K/UL — SIGNIFICANT CHANGE UP (ref 3.8–10.5)
WBC # FLD AUTO: 8.78 K/UL — SIGNIFICANT CHANGE UP (ref 3.8–10.5)

## 2025-02-26 PROCEDURE — 71045 X-RAY EXAM CHEST 1 VIEW: CPT | Mod: 26

## 2025-02-26 PROCEDURE — 99291 CRITICAL CARE FIRST HOUR: CPT

## 2025-02-26 RX ORDER — OXYCODONE HYDROCHLORIDE 30 MG/1
10 TABLET ORAL ONCE
Refills: 0 | Status: DISCONTINUED | OUTPATIENT
Start: 2025-02-26 | End: 2025-02-26

## 2025-02-26 RX ORDER — SODIUM ZIRCONIUM CYCLOSILICATE 5 G/5G
10 POWDER, FOR SUSPENSION ORAL ONCE
Refills: 0 | Status: COMPLETED | OUTPATIENT
Start: 2025-02-26 | End: 2025-02-26

## 2025-02-26 RX ORDER — TRAZODONE HCL 100 MG
100 TABLET ORAL AT BEDTIME
Refills: 0 | Status: DISCONTINUED | OUTPATIENT
Start: 2025-02-26 | End: 2025-03-19

## 2025-02-26 RX ORDER — HEPARIN SODIUM 1000 [USP'U]/ML
1300 INJECTION INTRAVENOUS; SUBCUTANEOUS
Qty: 25000 | Refills: 0 | Status: DISCONTINUED | OUTPATIENT
Start: 2025-02-26 | End: 2025-03-06

## 2025-02-26 RX ADMIN — Medication 100 MILLIGRAM(S): at 23:18

## 2025-02-26 RX ADMIN — EPOETIN ALFA 10000 UNIT(S): 10000 SOLUTION INTRAVENOUS; SUBCUTANEOUS at 15:16

## 2025-02-26 RX ADMIN — Medication 5 MILLIGRAM(S): at 23:18

## 2025-02-26 RX ADMIN — AMIODARONE HYDROCHLORIDE 200 MILLIGRAM(S): 50 INJECTION, SOLUTION INTRAVENOUS at 05:57

## 2025-02-26 RX ADMIN — ATORVASTATIN CALCIUM 80 MILLIGRAM(S): 80 TABLET, FILM COATED ORAL at 21:17

## 2025-02-26 RX ADMIN — MIRTAZAPINE 7.5 MILLIGRAM(S): 30 TABLET, FILM COATED ORAL at 21:17

## 2025-02-26 RX ADMIN — METHOCARBAMOL 500 MILLIGRAM(S): 500 TABLET, FILM COATED ORAL at 21:17

## 2025-02-26 RX ADMIN — Medication 1 SPRAY(S): at 18:48

## 2025-02-26 RX ADMIN — METHOCARBAMOL 500 MILLIGRAM(S): 500 TABLET, FILM COATED ORAL at 05:57

## 2025-02-26 RX ADMIN — Medication 1 TABLET(S): at 11:42

## 2025-02-26 RX ADMIN — OXYCODONE HYDROCHLORIDE 10 MILLIGRAM(S): 30 TABLET ORAL at 06:30

## 2025-02-26 RX ADMIN — Medication 1 APPLICATION(S): at 05:58

## 2025-02-26 RX ADMIN — OXYCODONE HYDROCHLORIDE 10 MILLIGRAM(S): 30 TABLET ORAL at 06:00

## 2025-02-26 RX ADMIN — HEPARIN SODIUM 12.5 UNIT(S)/HR: 1000 INJECTION INTRAVENOUS; SUBCUTANEOUS at 10:17

## 2025-02-26 RX ADMIN — Medication 500 MILLIGRAM(S): at 11:41

## 2025-02-26 RX ADMIN — Medication 81 MILLIGRAM(S): at 11:42

## 2025-02-26 RX ADMIN — Medication 1 APPLICATION(S): at 21:18

## 2025-02-26 RX ADMIN — INSULIN LISPRO 4: 100 INJECTION, SOLUTION INTRAVENOUS; SUBCUTANEOUS at 11:42

## 2025-02-26 RX ADMIN — SODIUM ZIRCONIUM CYCLOSILICATE 10 GRAM(S): 5 POWDER, FOR SUSPENSION ORAL at 07:01

## 2025-02-26 RX ADMIN — Medication 300 MILLIGRAM(S): at 11:41

## 2025-02-26 RX ADMIN — OXYCODONE HYDROCHLORIDE 10 MILLIGRAM(S): 30 TABLET ORAL at 14:43

## 2025-02-26 RX ADMIN — Medication 40 MILLIGRAM(S): at 12:16

## 2025-02-26 RX ADMIN — OXYCODONE HYDROCHLORIDE 10 MILLIGRAM(S): 30 TABLET ORAL at 15:15

## 2025-02-26 RX ADMIN — METHOCARBAMOL 500 MILLIGRAM(S): 500 TABLET, FILM COATED ORAL at 14:22

## 2025-02-26 NOTE — PROGRESS NOTE ADULT - ASSESSMENT
54M with PMH of HTN, HLD, ESRD on HD MWF via LUE AVF, ascites (requiring repeat paracenteses q4-6wks), NICM with moderate LV dysfunction w/ EF 35-40%() and CAD s/p atherectomy + SALLIE to D1(2024) presents to [facility] transferred from [facility].  Cardiac cath which showed pLAD 70% ISR, mLCx 70%, D1 severe dz.   Patient now transferred to [facility] CTS Dr. Rivers for CABG eval      # CAD s/p NSTEMI  Hx CAD s/p PCI LAD   Presented with ADHF elevated troponins  Positive NST1-Cath- pLAD 70% ISR, mLCx 70%, D1 severe dz.   TTE EF 35% Severe TRWas on Plavix-p2y12- 321 been off Plavix since -POD#1-C3L free LIMA-LAD; SVG-Diag; ZDI-egqqZC-Udponktar on  Sinus rhythm,Amio for AF prophylaxis   Atrial Flutter on TC during day Ambulating  -Increased congestion on CXR had HD yesterday remains in AFl   -Atrial Flutter HFTC 40L/30% BP stable     -Atrial Flutter tolerating TC for HD MWF-consider DOAC   OOB Abd US moderate 4 quad ascites noted remains in Atrial Flutter~~70's        # Acute on Chronic Systolic Heart Failure now improved  EF-35% ,severe TR  Had HD post op  GDMT post op  LVEF improved post bypass   -TTE EF 40%,trace effusion  ECG c/w pericarditis-was on colchicine   01/15-TTE EF 55%  -TTE EF 60%   -Normal LV systolic function Moderate pericardial effusion  -On TC-HF and Vent support at nights   02/10-Vent HS with T Collar trial Ambulated Remains in AF  Repeat TTE Normal LV Systolic function Small Pericardial effusion decreased from 2/3        Vascular evaluated  AVGraft /?steal causing RV failure-'' Very low suspicion of steal Sd and AVF causing current clinical state. ''   VA Duplex Hemodialysis Access, Left (25 @ 13:35) >   Arterial flow volume of 1301 mL/m, and the venous flow volume of  1492 mL/m are consistent with a normally functioning dialysis access  fistula.      # Ascites  Hx chronic ascites  Has drainage q4-6 weeks  Last drained -4600mL  ? ascites related to severe TR  Had gisnjsdrkvlu-Ghzbcrf-gk growth   CT Abdomen 01/10-Large volume ascites-  US abdomen--Small to moderate volume abdominal/pelvic ascites  US abdomen  Moderate ascites  US Abdomen -Moderate 4 quad ascites    # ESRD  Now on  HD-MWF

## 2025-02-26 NOTE — PROGRESS NOTE ADULT - ASSESSMENT
54M with PMH of HTN, HLD, ESRD on HD MWF via LUE AVF, ascites (requiring repeat paracenteses q4-6wks), NICM with moderate LV dysfunction w/ EF 35-40%(2023) and CAD s/p atherectomy + SALLIE to D1(4/11/2024) presents to [facility] transferred from [facility] for CABG eval  Nephro on board for HD needs.    ESRD on HD   Nephrologist Dr. Bailey  [facility] Schedule / Access:  LUE AVF  Center: [facility]  Pt s/p HD on 2/19 and 2/21.  s/p HD 2/24 uf 1.5L  HD today  Keep MAP>65  Renal Diet  HD consent in chart     Hyper/Hypokalemia  Pt intermittently Hyperkalemia  HD as scheduled  now improved  Low K diet.  C/W Lokelma 10gm on nonHD days.  Monitor K.    HTN  BP fluctuating; controlled.  UF w/ HD as BP permits.  Low sodium diet.  Management per ICU  Monitor BP     CKD MBD  Check PTH   Low PO4 diet.  Not on binder.  Monitor Ca , PO4 daily     Anemia  CRISTIANE with HD  Repeat iron studies.  Transfuse if hgb <7    CAD with multivessel disease  CTICU following  s/p CABG 12/30    Hypoxic respiratory failure requiring Trach  Per surgery  S/p bronchoscopy, pulm following

## 2025-02-26 NOTE — PROGRESS NOTE ADULT - SUBJECTIVE AND OBJECTIVE BOX
[facility]    Dr. Nica Styles     Office [phone] (9 am to 5 pm)  Service: [phone] (5pm to 9am)  Also Available on TEAMS      RENAL PROGRESS NOTE: DATE OF SERVICE 02-26-25 @ 10:12    Patient is a 54y old  Male who presents with a chief complaint of CAD s/p CABG (26 Feb 2025 06:20)      Patient seen and examined at bedside. No chest pain/sob    VITALS:  T(F): 97.8 (02-26-25 @ 08:00), Max: 98.2 (02-26-25 @ 00:00)  HR: 58 (02-26-25 @ 09:00)  BP: 105/51 (02-26-25 @ 09:00)  RR: 14 (02-26-25 @ 09:00)  SpO2: 100% (02-26-25 @ 09:00)  Wt(kg): --    02-25 @ 07:01  -  02-26 @ 07:00  --------------------------------------------------------  IN: 1803.5 mL / OUT: 0 mL / NET: 1803.5 mL    02-26 @ 07:01  -  02-26 @ 10:12  --------------------------------------------------------  IN: 167 mL / OUT: 0 mL / NET: 167 mL          PHYSICAL EXAM:  Constitutional: NAD  Neck: No JVD  Respiratory: CTAB, no wheezes, rales or rhonchi  Cardiovascular: S1, S2, RRR  Gastrointestinal: BS+, soft, NT/ND  Extremities: No peripheral edema    Hospital Medications:   MEDICATIONS  (STANDING):  aMIOdarone    Tablet 200 milliGRAM(s) Oral daily  ascorbic acid 500 milliGRAM(s) Oral daily  aspirin  chewable 81 milliGRAM(s) Oral daily  atorvastatin 80 milliGRAM(s) Oral at bedtime  chlorhexidine 2% Cloths 1 Application(s) Topical daily  chlorhexidine 4% Liquid 1 Application(s) Topical <User Schedule>  dextrose 50% Injectable 50 milliLiter(s) IV Push every 15 minutes  epoetin ignacia (EPOGEN) Injectable 18348 Unit(s) IV Push <User Schedule>  ferrous    sulfate Liquid 300 milliGRAM(s) Enteral Tube daily  heparin  Infusion 1400 Unit(s)/Hr (12.5 mL/Hr) IV Continuous <Continuous>  insulin lispro (ADMELOG) corrective regimen sliding scale   SubCutaneous every 6 hours  melatonin 5 milliGRAM(s) Oral at bedtime  methocarbamol 500 milliGRAM(s) Oral every 8 hours  metoprolol tartrate 25 milliGRAM(s) Oral two times a day  mirtazapine 7.5 milliGRAM(s) Oral at bedtime  Nephro-elicia 1 Tablet(s) Oral daily  pantoprazole  Injectable 40 milliGRAM(s) IV Push daily  sodium chloride 0.65% Nasal 1 Spray(s) Both Nostrils every 12 hours  sodium chloride 0.9%. 1000 milliLiter(s) (10 mL/Hr) IV Continuous <Continuous>  sodium zirconium cyclosilicate 10 Gram(s) Oral <User Schedule>  traZODone 50 milliGRAM(s) Oral at bedtime      LABS:  02-26    134[L]  |  95[L]  |  42[H]  ----------------------------<  124[H]  5.7[H]   |  27  |  5.17[H]    Ca    9.4      26 Feb 2025 00:42  Phos  4.6     02-26  Mg     2.7     02-26    TPro  6.5  /  Alb  2.8[L]  /  TBili  0.4  /  DBili      /  AST  13  /  ALT  9[L]  /  AlkPhos  94  02-26    Creatinine Trend: 5.17 <--, 4.17 <--, 5.72 <--, 4.86 <--, 3.85 <--, 5.45 <--, 4.63 <--    Albumin: 2.8 g/dL (02-26 @ 00:42)  Phosphorus: 4.6 mg/dL (02-26 @ 00:42)                              7.6    10.08 )-----------( 272      ( 26 Feb 2025 00:42 )             24.2     Urine Studies:  Urinalysis - [02-26-25 @ 00:42]      Color  / Appearance  / SG  / pH       Gluc 124 / Ketone   / Bili  / Urobili        Blood  / Protein  / Leuk Est  / Nitrite       RBC  / WBC  / Hyaline  / Gran  / Sq Epi  / Non Sq Epi  / Bacteria       Iron 29, TIBC 143, %sat 20      [12-19-24 @ 05:00]  Ferritin 904      [12-19-24 @ 05:00]  TSH 4.75      [12-24-24 @ 06:50]        RADIOLOGY & ADDITIONAL STUDIES:

## 2025-02-26 NOTE — PROGRESS NOTE ADULT - SUBJECTIVE AND OBJECTIVE BOX
MR#[id number]  PATIENT NAME:RAAD CANO    DATE OF SERVICE: 02-26-25 @ 06:20  Patient was seen and examined by Solo Quick MD on    02-26-25 @ 06:20 .  Interim events noted.Consultant notes ,Labs,Telemetry reviewed by me       HOSPITAL COURSE: HPI:  54M with PMH of HTN, HLD, ESRD on HD MWF via LUE AVF, ascites (requiring repeat paracenteses q4-6wks), NICM with moderate LV dysfunction w/ EF 35-40%(2023) and CAD s/p atherectomy + SALLIE to D1(4/11/2024) presents to [facility] transferred from [facility]. Patient initially presented to [facility] with shortness of breath. Of note he was recently admitted from 09/27-12/02 for acute cholecystitis s/p cholecystectomy. In [facility] ED, pt was hypoxic to 86% on RA, trop 1.7K, EKG no new changes, CXR fluid overload. Admitted for AHRF 2/2 fluid overload. Pt received HD on 12/18, 12/19, 12/20 and 12/22. DAPT was resumed, TTE showed improvement in LVEF to 40-45%. Stress test was abnormal with 1mm inferior STD, reversible ischemia in the inferior wall and fixed defects in the lateral, anterior and apical walls with post stress EF of 24%. Pt was then transferred to [facility] for cardiac cath which showed pLAD 70% ISR, mLCx 70%, D1 severe dz. Patient now transferred to [facility] CTS Dr. Rivers for CABG eval. Patient denies any current SOB or chest pain on admission. Otherwise denies headaches, abdominal pain, urinary or bowel changes, fevers, chills, N/V/D, sick contacts.  (24 Dec 2024 05:07)      INTERIM EVENTS:Patient seen at bedside ,interim events noted.  12/23 Cardiac cath  pLAD 70% ISR, mLCx 70%, D1 severe dz.   12/31-POD#1-C3L free LIMA-LAD; SVG-Diag; SVG-leftPL Awake OOB extubated Sinus rhythm on Dobutamine gtt  02/19 Had Bronch yesterday on vent HS remains in Atrial Flutter  02/21-Awake on HFTC at night Ambulating-Atrial Flutter  02/22-Had Midline Remains in Atrial Flutter on TC  02/23-Lying in bed still on NGT feeds on TC-HFTC-40L/30% Atrial Flutter  02/24-Awake Tolerating TC Atrial Flutter~~'s  For HD today    02/26-OOB on TC not dyspneac remains in AFl on Tube feeds      PMH -reviewed admission note, no change since admission          MEDICATIONS  (STANDING):  aMIOdarone    Tablet 200 milliGRAM(s) Oral daily  ascorbic acid 500 milliGRAM(s) Oral daily  aspirin  chewable 81 milliGRAM(s) Oral daily  atorvastatin 80 milliGRAM(s) Oral at bedtime  chlorhexidine 2% Cloths 1 Application(s) Topical daily  chlorhexidine 4% Liquid 1 Application(s) Topical <User Schedule>  dextrose 50% Injectable 50 milliLiter(s) IV Push every 15 minutes  epoetin ignacia (EPOGEN) Injectable 92996 Unit(s) IV Push <User Schedule>  ferrous    sulfate Liquid 300 milliGRAM(s) Enteral Tube daily  heparin  Infusion 1400 Unit(s)/Hr (13.5 mL/Hr) IV Continuous <Continuous>  insulin lispro (ADMELOG) corrective regimen sliding scale   SubCutaneous every 6 hours  melatonin 5 milliGRAM(s) Oral at bedtime  methocarbamol 500 milliGRAM(s) Oral every 8 hours  metoprolol tartrate 25 milliGRAM(s) Oral two times a day  mirtazapine 7.5 milliGRAM(s) Oral at bedtime  Nephro-elicia 1 Tablet(s) Oral daily  pantoprazole  Injectable 40 milliGRAM(s) IV Push daily  sodium chloride 0.65% Nasal 1 Spray(s) Both Nostrils every 12 hours  sodium chloride 0.9%. 1000 milliLiter(s) (10 mL/Hr) IV Continuous <Continuous>  sodium zirconium cyclosilicate 10 Gram(s) Oral <User Schedule>  sodium zirconium cyclosilicate 10 Gram(s) Oral once  traZODone 50 milliGRAM(s) Oral at bedtime    MEDICATIONS  (PRN):  acetaminophen     Tablet .. 650 milliGRAM(s) Oral every 6 hours PRN Mild Pain (1 - 3)  lidocaine/prilocaine Cream 1 Application(s) Topical daily PRN pre-HD  sodium chloride 0.9% lock flush 10 milliLiter(s) IV Push every 1 hour PRN Pre/post blood products, medications, blood draw, and to maintain line patency            REVIEW OF SYSTEMS:  Constitutional: [ ] fever, [ ]weight loss,  [x ]fatigue [ ]weight gain  Eyes: [ ] visual changes  Respiratory: [ ]shortness of breath;  [ ] cough, [ ]wheezing, [ ]chills, [ ]hemoptysis  Cardiovascular: [ ] chest pain, [ ]palpitations, [ ]dizziness,  [ ]leg swelling[ ]orthopnea[ ]PND  Gastrointestinal: [ ] abdominal pain, [ ]nausea, [ ]vomiting,  [ ]diarrhea [ ]Constipation [ ]Melena  Genitourinary: [ ] dysuria, [ ] hematuria [ ]Vigil  Neurologic: [ ] headaches [ ] tremors[ ]weakness [ ]Paralysis Right[ ] Left[ ]  Skin: [ ] itching, [ ]burning, [ ] rashes  Endocrine: [ ] heat or cold intolerance  Musculoskeletal: [ ] joint pain or swelling; [ ] muscle, back, or extremity pain  Psychiatric: [ ] depression, [ ]anxiety, [ ]mood swings, or [ ]difficulty sleeping  Hematologic: [ ] easy bruising, [ ] bleeding gums    [ ] All remaining systems negative except as per above.   [ ]Unable to obtain.  [x] No change in ROS since admission      Vital Signs Last 24 Hrs  T(C): 36.7 (26 Feb 2025 04:00), Max: 37.1 (25 Feb 2025 08:00)  T(F): 98 (26 Feb 2025 04:00), Max: 98.8 (25 Feb 2025 08:00)  HR: 67 (26 Feb 2025 06:00) (55 - 111)  BP: 114/51 (26 Feb 2025 06:00) (114/51 - 176/80)  BP(mean): 74 (26 Feb 2025 06:00) (74 - 119)  RR: 15 (26 Feb 2025 06:00) (11 - 29)  SpO2: 100% (26 Feb 2025 06:00) (94% - 100%)    Parameters below as of 26 Feb 2025 04:00  Patient On (Oxygen Delivery Method): tracheostomy collar    O2 Concentration (%): 40  I&O's Summary    24 Feb 2025 07:01  -  25 Feb 2025 07:00  --------------------------------------------------------  IN: 1392 mL / OUT: 1501 mL / NET: -109 mL    25 Feb 2025 07:01  -  26 Feb 2025 06:20  --------------------------------------------------------  IN: 1319.5 mL / OUT: 0 mL / NET: 1319.5 mL        PHYSICAL EXAM:  General: No acute distress BMI-24  HEENT: EOMI, PERRL  Neck: Supple, [ ] JVD [x] Trach  Lungs: Equal air entry bilaterally; [ ] rales [ ] wheezing [ ] rhonchi  Heart: Regular rate and rhythm; [x ] murmur   2/6 [ x] systolic [ ] diastolic [ ] radiation[ ] rubs [ ]  gallops  Abdomen: Nontender, bowel sounds present  Extremities: No clubbing, cyanosis, [ ] edema [ ]Pulses  equal and intact  Nervous system:  Alert & Oriented X3, no focal deficits  Psychiatric: Normal affect  Skin: No rashes or lesions    LABS:  02-26    134[L]  |  95[L]  |  42[H]  ----------------------------<  124[H]  5.7[H]   |  27  |  5.17[H]    Ca    9.4      26 Feb 2025 00:42  Phos  4.6     02-26  Mg     2.7     02-26    TPro  6.5  /  Alb  2.8[L]  /  TBili  0.4  /  DBili  x   /  AST  13  /  ALT  9[L]  /  AlkPhos  94  02-26    Creatinine Trend: 5.17<--, 4.17<--, 5.72<--, 4.86<--, 3.85<--, 5.45<--                        7.6    10.08 )-----------( 272      ( 26 Feb 2025 00:42 )             24.2     PT/INR - ( 26 Feb 2025 00:42 )   PT: 14.4 sec;   INR: 1.25 ratio         PTT - ( 26 Feb 2025 00:42 )  PTT:80.6 sec        US Abdomen Limited (02.25.25 @ 15:56) >  IMPRESSION:  Moderate volume ascites seen in all 4 quadrants. Deepest pocket at the  right lower quadrant.    Right pleural effusion.           Xray Chest 1 View- PORTABLE-Urgent (Xray Chest 1 View- PORTABLE-Urgent .) (02.23.25 @ 13:13) >  COMPARISON: Chest x-ray 2/23/2025.    FINDINGS:  Enteric tube with tip looping inside the stomach.  Median sternotomy wires, sternal surgical clips.  The heart is not accurately assessed in this AP projection.  Decreased pulmonary vascular congestion.  Right lower lung opacity adjacent to the cardiophrenic angle.  There is no pneumothorax or large pleural effusion.  No acute bony abnormality.    IMPRESSION:  Pulmonary vascular congestion, decreased.  Right lowerlung opacity adjacent to the cardiophrenic angle.       TTE Limited W or WO Ultrasound Enhancing Agent (02.18.25 @ 13:54) >  CONCLUSIONS:      1. Left ventricular systolic function is normal.   2. Enlarged right ventricular cavity size and mildly reduced right ventricular systolic function.   3. Left pleural effusion noted.   4. Trace pericardial effusion.

## 2025-02-26 NOTE — PROGRESS NOTE ADULT - SUBJECTIVE AND OBJECTIVE BOX
Patient is a 54y old  Male who presents with a chief complaint of CAD s/p CABG (26 Feb 2025 06:20)      Patient seen and examined at bedside. No chest pain/sob    VITALS:  T(F): 97.8 (02-26-25 @ 08:00), Max: 98.2 (02-26-25 @ 00:00)  HR: 58 (02-26-25 @ 09:00)  BP: 105/51 (02-26-25 @ 09:00)  RR: 14 (02-26-25 @ 09:00)  SpO2: 100% (02-26-25 @ 09:00)  Wt(kg): --    02-25 @ 07:01  -  02-26 @ 07:00  --------------------------------------------------------  IN: 1803.5 mL / OUT: 0 mL / NET: 1803.5 mL    02-26 @ 07:01  -  02-26 @ 10:12  --------------------------------------------------------  IN: 167 mL / OUT: 0 mL / NET: 167 mL          PHYSICAL EXAM:  Constitutional: NAD  Neck: No JVD  Respiratory: dec breath sounds at bases  Cardiovascular: S1, S2,+  Gastrointestinal: BS+, soft, NT/ND  Extremities: No peripheral edema    Hospital Medications:   MEDICATIONS  (STANDING):  aMIOdarone    Tablet 200 milliGRAM(s) Oral daily  ascorbic acid 500 milliGRAM(s) Oral daily  aspirin  chewable 81 milliGRAM(s) Oral daily  atorvastatin 80 milliGRAM(s) Oral at bedtime  chlorhexidine 2% Cloths 1 Application(s) Topical daily  chlorhexidine 4% Liquid 1 Application(s) Topical <User Schedule>  dextrose 50% Injectable 50 milliLiter(s) IV Push every 15 minutes  epoetin ignacia (EPOGEN) Injectable 84618 Unit(s) IV Push <User Schedule>  ferrous    sulfate Liquid 300 milliGRAM(s) Enteral Tube daily  heparin  Infusion 1400 Unit(s)/Hr (12.5 mL/Hr) IV Continuous <Continuous>  insulin lispro (ADMELOG) corrective regimen sliding scale   SubCutaneous every 6 hours  melatonin 5 milliGRAM(s) Oral at bedtime  methocarbamol 500 milliGRAM(s) Oral every 8 hours  metoprolol tartrate 25 milliGRAM(s) Oral two times a day  mirtazapine 7.5 milliGRAM(s) Oral at bedtime  Nephro-elicia 1 Tablet(s) Oral daily  pantoprazole  Injectable 40 milliGRAM(s) IV Push daily  sodium chloride 0.65% Nasal 1 Spray(s) Both Nostrils every 12 hours  sodium chloride 0.9%. 1000 milliLiter(s) (10 mL/Hr) IV Continuous <Continuous>  sodium zirconium cyclosilicate 10 Gram(s) Oral <User Schedule>  traZODone 50 milliGRAM(s) Oral at bedtime      LABS:  02-26    134[L]  |  95[L]  |  42[H]  ----------------------------<  124[H]  5.7[H]   |  27  |  5.17[H]    Ca    9.4      26 Feb 2025 00:42  Phos  4.6     02-26  Mg     2.7     02-26    TPro  6.5  /  Alb  2.8[L]  /  TBili  0.4  /  DBili      /  AST  13  /  ALT  9[L]  /  AlkPhos  94  02-26    Creatinine Trend: 5.17 <--, 4.17 <--, 5.72 <--, 4.86 <--, 3.85 <--, 5.45 <--, 4.63 <--    Albumin: 2.8 g/dL (02-26 @ 00:42)  Phosphorus: 4.6 mg/dL (02-26 @ 00:42)                              7.6    10.08 )-----------( 272      ( 26 Feb 2025 00:42 )             24.2     Urine Studies:  Urinalysis - [02-26-25 @ 00:42]      Color  / Appearance  / SG  / pH       Gluc 124 / Ketone   / Bili  / Urobili        Blood  / Protein  / Leuk Est  / Nitrite       RBC  / WBC  / Hyaline  / Gran  / Sq Epi  / Non Sq Epi  / Bacteria       Iron 29, TIBC 143, %sat 20      [12-19-24 @ 05:00]  Ferritin 904      [12-19-24 @ 05:00]  TSH 4.75      [12-24-24 @ 06:50]

## 2025-02-26 NOTE — PROGRESS NOTE ADULT - ASSESSMENT
54M with PMH of HTN, HLD, ESRD on HD MWF via LUE AVF, ascites (requiring repeat paracenteses q4-6wks), NICM with moderate LV dysfunction w/ EF 35-40%() and CAD s/p atherectomy + SALLIE to D1(2024) presents to [facility] transferred from [facility].  Cardiac cath which showed pLAD 70% ISR, mLCx 70%, D1 severe dz.   Patient now transferred to [facility] CTS Dr. Rivers for CABG eval      # CAD s/p NSTEMI  Hx CAD s/p PCI LAD   Presented with ADHF elevated troponins  Positive NST1-Cath- pLAD 70% ISR, mLCx 70%, D1 severe dz.   TTE EF 35% Severe TRWas on Plavix-p2y12- 321 been off Plavix since -POD#1-C3L free LIMA-LAD; SVG-Diag; TYJ-hxwvMI-Inibxwdim on  Sinus rhythm,Amio for AF prophylaxis   Atrial Flutter on TC during day Ambulating  -Increased congestion on CXR had HD yesterday remains in AFl   -Atrial Flutter HFTC 40L/30% BP stable     -Atrial Flutter tolerating TC for HD MWF-consider DOAC   OOB Abd US moderate 4 quad ascites noted remains in Atrial Flutter~~70's        # Acute on Chronic Systolic Heart Failure now improved  EF-35% ,severe TR  Had HD post op  GDMT post op  LVEF improved post bypass   -TTE EF 40%,trace effusion  ECG c/w pericarditis-was on colchicine   01/15-TTE EF 55%  -TTE EF 60%   -Normal LV systolic function Moderate pericardial effusion  -On TC-HF and Vent support at nights   02/10-Vent HS with T Collar trial Ambulated Remains in AF  Repeat TTE Normal LV Systolic function Small Pericardial effusion decreased from 2/3        Vascular evaluated  AVGraft /?steal causing RV failure-'' Very low suspicion of steal Sd and AVF causing current clinical state. ''   VA Duplex Hemodialysis Access, Left (25 @ 13:35) >   Arterial flow volume of 1301 mL/m, and the venous flow volume of  1492 mL/m are consistent with a normally functioning dialysis access  fistula.      # Ascites  Hx chronic ascites  Has drainage q4-6 weeks  Last drained -4600mL  ? ascites related to severe TR  Had dojantzcrmpj-Lvhebqn-rh growth   CT Abdomen 01/10-Large volume ascites-  US abdomen--Small to moderate volume abdominal/pelvic ascites  US abdomen  Moderate ascites  US Abdomen -Moderate 4 quad ascites    # ESRD  Now on  HD-MWF

## 2025-02-26 NOTE — PROGRESS NOTE ADULT - SUBJECTIVE AND OBJECTIVE BOX
Patient seen and examined at the bedside.    Remains critically ill on continuous ICU monitoring, at risk for life threatening decompensation.  All Labs, data reviewed. Plan of care discussed in length during multi-disciplinary ICU rounds.       Brief Summary:  53 yo M with multiple medical problems including CAD s/p PCI in April 2024 s/p CABG x 3 on 12/30/24  1/2: Intubated for hypoxia & left lung white out s/p awake bronch with removal of copious mucopurulent secretions  1/4: s/p IABP insertion, sepsis/septic shock due to E coli   ESBL pneumonia, collapsed Left Lung, s/p multiple bronch   2/6: bronch today off positive pressure. 2/1: MRSA and serratia from cyst on the back. Plan for Levaquin + Vanc x 5 days  2/8 : concern for left food drop   2/10 : no acute issues - CXR shows improving left sided aeration, pt subjectively reports having difficulty while lying flat  2/11: s/p Paracentesis 3.5 L removed, leukocytosis   2/12: Ascites fluid PMN < 250 (less likely SBP)   sniff test yesterday showed paradoxical diaphragmatic motion    2/13: mucus plugging but able to clear airway with aggressive pulmonary toileting.   2/14: no vent requirement overnight, able to mobilize secretion with pulm toileting  hyperkalemia post HD - workup for possible recirculation underway   2/16: On TC X 48 hours - ambulating well, no mucus plugging events  2/22: HFTC x24hrs (40L 30%)  2/24: iHD 1.5L UF    24 Hour events:  AUGUSTO collapse improved w/ chest PT  Last iHD session 2/24 : 1.5L UF  Plan for iHD today  Tolerated TC for since 2/22.   Chest PT. ambulate    Objective:  Vital Signs Last 24 Hrs  T(C): 36.7 (26 Feb 2025 04:00), Max: 37.1 (25 Feb 2025 08:00)  T(F): 98 (26 Feb 2025 04:00), Max: 98.8 (25 Feb 2025 08:00)  HR: 61 (26 Feb 2025 07:00) (55 - 111)  BP: 110/53 (26 Feb 2025 07:00) (110/53 - 176/80)  BP(mean): 67 (26 Feb 2025 07:00) (67 - 119)  RR: 12 (26 Feb 2025 07:00) (11 - 29)  SpO2: 100% (26 Feb 2025 07:00) (94% - 100%)    Parameters below as of 26 Feb 2025 04:00  Patient On (Oxygen Delivery Method): tracheostomy collar    O2 Concentration (%): 40    Physical Exam:  General: Alert and interactive  Neurology: Oriented, following commands  Respiratory: Clear bilaterally, Trach site ok.  CV: AFib  Abdominal: Soft, Nontender  Extremities: Warm, well-perfused  -------------------------------------------------------------------------------------------------------------------------------    Labs:                        7.6    10.08 )-----------( 272      ( 26 Feb 2025 00:42 )             24.2     02-26    134[L]  |  95[L]  |  42[H]  ----------------------------<  124[H]  5.7[H]   |  27  |  5.17[H]    Ca    9.4      26 Feb 2025 00:42  Phos  4.6     02-26  Mg     2.7     02-26    TPro  6.5  /  Alb  2.8[L]  /  TBili  0.4  /  DBili  x   /  AST  13  /  ALT  9[L]  /  AlkPhos  94  02-26    LIVER FUNCTIONS - ( 26 Feb 2025 00:42 )  Alb: 2.8 g/dL / Pro: 6.5 g/dL / ALK PHOS: 94 U/L / ALT: 9 U/L / AST: 13 U/L / GGT: x           PT/INR - ( 26 Feb 2025 06:48 )   PT: 14.1 sec;   INR: 1.23 ratio       PTT - ( 26 Feb 2025 06:48 )  PTT:73.6 sec    ALL MEDICATIONS   MEDICATIONS  (STANDING):  aMIOdarone    Tablet 200 milliGRAM(s) Oral daily  ascorbic acid 500 milliGRAM(s) Oral daily  aspirin  chewable 81 milliGRAM(s) Oral daily  atorvastatin 80 milliGRAM(s) Oral at bedtime  chlorhexidine 2% Cloths 1 Application(s) Topical daily  chlorhexidine 4% Liquid 1 Application(s) Topical <User Schedule>  dextrose 50% Injectable 50 milliLiter(s) IV Push every 15 minutes  epoetin ignacia (EPOGEN) Injectable 95495 Unit(s) IV Push <User Schedule>  ferrous    sulfate Liquid 300 milliGRAM(s) Enteral Tube daily  heparin  Infusion 1400 Unit(s)/Hr (13.5 mL/Hr) IV Continuous <Continuous>  insulin lispro (ADMELOG) corrective regimen sliding scale   SubCutaneous every 6 hours  melatonin 5 milliGRAM(s) Oral at bedtime  methocarbamol 500 milliGRAM(s) Oral every 8 hours  metoprolol tartrate 25 milliGRAM(s) Oral two times a day  mirtazapine 7.5 milliGRAM(s) Oral at bedtime  Nephro-elicia 1 Tablet(s) Oral daily  pantoprazole  Injectable 40 milliGRAM(s) IV Push daily  sodium chloride 0.65% Nasal 1 Spray(s) Both Nostrils every 12 hours  sodium chloride 0.9%. 1000 milliLiter(s) (10 mL/Hr) IV Continuous <Continuous>  sodium zirconium cyclosilicate 10 Gram(s) Oral <User Schedule>  traZODone 50 milliGRAM(s) Oral at bedtime    MEDICATIONS  (PRN):  acetaminophen     Tablet .. 650 milliGRAM(s) Oral every 6 hours PRN Mild Pain (1 - 3)  lidocaine/prilocaine Cream 1 Application(s) Topical daily PRN pre-HD  sodium chloride 0.9% lock flush 10 milliLiter(s) IV Push every 1 hour PRN Pre/post blood products, medications, blood draw, and to maintain line patency  ------------------------------------------------------------------------------------------------------------------------------  Assessment:  53 yo M s/p CABG x 3 on 12/30/24    Postop acute respiratory failure  Acute blood loss anemia  ESRD on iHD   hx of ESBL   hx of VRE  Serratia infection  MSRA infection   sacral decub     Plan:   ***Neuro***  Postoperative acute pain control with Robaxin and Tylenol  Trazodone, Remeron, and Melatonin nightly  PT ongoing    ***Cardiovascular***  s/p CABG x 3  A flutter - Tolerating beta blocker, PO Amio for rate control  ASA / Statin daily    ***Pulmonary***  Postoperative acute respiratory insufficiency - HFTC   Tracheostomy 1/18   Mucomyst, normal saline nebs, Xopenex to help mobilize secretions  Left lung PNA, + e coli ESBL - cleared but now with VRE   Needs aggressive pulm toileting   Bronchoscopy 2/18 for recurrent AUGUSTO opacification     ***GI***  Tolerating Tube feeds at goal  Protonix for stress ulcer prophylaxis.     ***Renal***  ESRD - tolerating iHD  Nephro-Elicia for renal support    ***ID***  No active infection - off antibiotics   Monitor cultures  1/28 BAL - commensal manjit   1/24 VRE BAL - Linezolid   1/24 Acyclovir for + HSV in BAL  1/26 Serratia on tissue cx and h/o ESBL PNA   2/1: MRSA and serratia from cyst on the back. Levaquin + Vanco, completed course  2/28 BAL commensal manjit     ***Endocrine***  Hyperglycemia - ISS    ***Hematology***  Acute blood loss anemia - no transfusion indication currently.  CBC, coags  Continue Epogen.  Heparin infusion for Afib        I, Stella Hodges MD, personally performed the services described in this documentation. All medical record entries made by the scribe were at my direction and in my presence. I have reviewed the chart and agree that the record reflects my personal performance and is accurate and complete  Electronically signed:   Stella Hodges MD  CT ICU attending     ICU time: ** mins     By signing my name below, I, Baldemar Hernandez, attest that this documentation has been prepared under the direction and in the presence of Stella Hodges MD  Electronically signed: Baldemar Hernandez, 02-26-25 @ 07:59             Patient seen and examined at the bedside.    Remains critically ill on continuous ICU monitoring, at risk for life threatening decompensation.  All Labs, data reviewed. Plan of care discussed in length during multi-disciplinary ICU rounds.       Brief Summary:  53 yo M with multiple medical problems including CAD s/p PCI in April 2024 s/p CABG x 3 on 12/30/24  1/2: Intubated for hypoxia & left lung white out s/p awake bronch with removal of copious mucopurulent secretions  1/4: s/p IABP insertion, sepsis/septic shock due to E coli   ESBL pneumonia, collapsed Left Lung, s/p multiple bronch   2/6: bronch today off positive pressure. 2/1: MRSA and serratia from cyst on the back. Plan for Levaquin + Vanc x 5 days  2/8 : concern for left food drop   2/10 : no acute issues - CXR shows improving left sided aeration, pt subjectively reports having difficulty while lying flat  2/11: s/p Paracentesis 3.5 L removed, leukocytosis   2/12: Ascites fluid PMN < 250 (less likely SBP)   sniff test yesterday showed paradoxical diaphragmatic motion    2/13: mucus plugging but able to clear airway with aggressive pulmonary toileting.   2/14: no vent requirement overnight, able to mobilize secretion with pulm toileting  hyperkalemia post HD - workup for possible recirculation underway   2/16: On TC X 48 hours - ambulating well, no mucus plugging events  2/22: HFTC x24hrs (40L 30%)  2/24: iHD 1.5L UF  2/26: iHD    24 Hour events:  AUGUSTO collapse improved w/ chest PT yesterday  Last iHD session 2/24 : 1.5L UF  Plan for iHD today  Tolerated TC for since 2/22.   Chest PT. ambulate    Objective:  Vital Signs Last 24 Hrs  T(C): 36.7 (26 Feb 2025 04:00), Max: 37.1 (25 Feb 2025 08:00)  T(F): 98 (26 Feb 2025 04:00), Max: 98.8 (25 Feb 2025 08:00)  HR: 61 (26 Feb 2025 07:00) (55 - 111)  BP: 110/53 (26 Feb 2025 07:00) (110/53 - 176/80)  BP(mean): 67 (26 Feb 2025 07:00) (67 - 119)  RR: 12 (26 Feb 2025 07:00) (11 - 29)  SpO2: 100% (26 Feb 2025 07:00) (94% - 100%)    Parameters below as of 26 Feb 2025 04:00  Patient On (Oxygen Delivery Method): tracheostomy collar    O2 Concentration (%): 40    Physical Exam:  General: Alert and interactive  Neurology: Oriented, following commands  Respiratory: Clear bilaterally, Trach site ok.  CV: AFib  Abdominal: Soft, Nontender  Extremities: Warm, well-perfused  -------------------------------------------------------------------------------------------------------------------------------    Labs:                        7.6    10.08 )-----------( 272      ( 26 Feb 2025 00:42 )             24.2     02-26    134[L]  |  95[L]  |  42[H]  ----------------------------<  124[H]  5.7[H]   |  27  |  5.17[H]    Ca    9.4      26 Feb 2025 00:42  Phos  4.6     02-26  Mg     2.7     02-26    TPro  6.5  /  Alb  2.8[L]  /  TBili  0.4  /  DBili  x   /  AST  13  /  ALT  9[L]  /  AlkPhos  94  02-26    LIVER FUNCTIONS - ( 26 Feb 2025 00:42 )  Alb: 2.8 g/dL / Pro: 6.5 g/dL / ALK PHOS: 94 U/L / ALT: 9 U/L / AST: 13 U/L / GGT: x           PT/INR - ( 26 Feb 2025 06:48 )   PT: 14.1 sec;   INR: 1.23 ratio       PTT - ( 26 Feb 2025 06:48 )  PTT:73.6 sec    ALL MEDICATIONS   MEDICATIONS  (STANDING):  aMIOdarone    Tablet 200 milliGRAM(s) Oral daily  ascorbic acid 500 milliGRAM(s) Oral daily  aspirin  chewable 81 milliGRAM(s) Oral daily  atorvastatin 80 milliGRAM(s) Oral at bedtime  chlorhexidine 2% Cloths 1 Application(s) Topical daily  chlorhexidine 4% Liquid 1 Application(s) Topical <User Schedule>  dextrose 50% Injectable 50 milliLiter(s) IV Push every 15 minutes  epoetin ignacia (EPOGEN) Injectable 46464 Unit(s) IV Push <User Schedule>  ferrous    sulfate Liquid 300 milliGRAM(s) Enteral Tube daily  heparin  Infusion 1400 Unit(s)/Hr (13.5 mL/Hr) IV Continuous <Continuous>  insulin lispro (ADMELOG) corrective regimen sliding scale   SubCutaneous every 6 hours  melatonin 5 milliGRAM(s) Oral at bedtime  methocarbamol 500 milliGRAM(s) Oral every 8 hours  metoprolol tartrate 25 milliGRAM(s) Oral two times a day  mirtazapine 7.5 milliGRAM(s) Oral at bedtime  Nephro-elicia 1 Tablet(s) Oral daily  pantoprazole  Injectable 40 milliGRAM(s) IV Push daily  sodium chloride 0.65% Nasal 1 Spray(s) Both Nostrils every 12 hours  sodium chloride 0.9%. 1000 milliLiter(s) (10 mL/Hr) IV Continuous <Continuous>  sodium zirconium cyclosilicate 10 Gram(s) Oral <User Schedule>  traZODone 50 milliGRAM(s) Oral at bedtime    MEDICATIONS  (PRN):  acetaminophen     Tablet .. 650 milliGRAM(s) Oral every 6 hours PRN Mild Pain (1 - 3)  lidocaine/prilocaine Cream 1 Application(s) Topical daily PRN pre-HD  sodium chloride 0.9% lock flush 10 milliLiter(s) IV Push every 1 hour PRN Pre/post blood products, medications, blood draw, and to maintain line patency  ------------------------------------------------------------------------------------------------------------------------------  Assessment:  53 yo M s/p CABG x 3 on 12/30/24    Postop acute respiratory failure  Acute blood loss anemia  ESRD on iHD   hx of ESBL   hx of VRE  Serratia infection  MSRA infection   sacral decub     Plan:   ***Neuro***  Postoperative acute pain control with Robaxin and Tylenol  Trazodone, Remeron, and Melatonin nightly  PT ongoing    ***Cardiovascular***  s/p CABG x 3  A flutter - Tolerating beta blocker, PO Amio for rate control  ASA / Statin daily    ***Pulmonary***  Postoperative acute respiratory insufficiency - HFTC   Tracheostomy 1/18   Mucomyst, normal saline nebs, Xopenex to help mobilize secretions  Left lung PNA, + e coli ESBL - cleared but now with VRE   Needs aggressive pulm toileting   Bronchoscopy 2/18 for recurrent AUGUSTO opacification     ***GI***  Tolerating Tube feeds at goal  Protonix for stress ulcer prophylaxis.     ***Renal***  ESRD - tolerating iHD  Nephro-Elicia for renal support    ***ID***  No active infection - off antibiotics   Monitor cultures  1/28 BAL - commensal manjit   1/24 VRE BAL - Linezolid   1/24 Acyclovir for + HSV in BAL  1/26 Serratia on tissue cx and h/o ESBL PNA   2/1: MRSA and serratia from cyst on the back. Levaquin + Vanco, completed course  2/28 BAL commensal manjit     ***Endocrine***  Hyperglycemia - ISS    ***Hematology***  Acute blood loss anemia - no transfusion indication currently.  CBC, coags  Continue Epogen.  Heparin infusion for Afib        IStella MD, personally performed the services described in this documentation. All medical record entries made by the scribe were at my direction and in my presence. I have reviewed the chart and agree that the record reflects my personal performance and is accurate and complete  Electronically signed:   Stella Hodges MD  CT ICU attending     ICU time: 45 mins     By signing my name below, I, Baldemar Hernandez, attest that this documentation has been prepared under the direction and in the presence of Stella Hodges MD  Electronically signed: Baldemar Hernandez, 02-26-25 @ 07:59

## 2025-02-27 LAB
ALBUMIN SERPL ELPH-MCNC: 2.9 G/DL — LOW (ref 3.3–5)
ALP SERPL-CCNC: 101 U/L — SIGNIFICANT CHANGE UP (ref 40–120)
ALT FLD-CCNC: 9 U/L — LOW (ref 10–45)
ANION GAP SERPL CALC-SCNC: 9 MMOL/L — SIGNIFICANT CHANGE UP (ref 5–17)
APTT BLD: 50 SEC — HIGH (ref 24.5–35.6)
AST SERPL-CCNC: 14 U/L — SIGNIFICANT CHANGE UP (ref 10–40)
BILIRUB SERPL-MCNC: 0.3 MG/DL — SIGNIFICANT CHANGE UP (ref 0.2–1.2)
CALCIUM SERPL-MCNC: 9 MG/DL — SIGNIFICANT CHANGE UP (ref 8.4–10.5)
CHLORIDE SERPL-SCNC: 97 MMOL/L — SIGNIFICANT CHANGE UP (ref 96–108)
CO2 SERPL-SCNC: 30 MMOL/L — SIGNIFICANT CHANGE UP (ref 22–31)
CREAT SERPL-MCNC: 3.45 MG/DL — HIGH (ref 0.5–1.3)
EGFR: 20 ML/MIN/1.73M2 — LOW
EGFR: 20 ML/MIN/1.73M2 — LOW
GLUCOSE SERPL-MCNC: 153 MG/DL — HIGH (ref 70–99)
MAGNESIUM SERPL-MCNC: 2.3 MG/DL — SIGNIFICANT CHANGE UP (ref 1.6–2.6)
PHOSPHATE SERPL-MCNC: 3.5 MG/DL — SIGNIFICANT CHANGE UP (ref 2.5–4.5)
POTASSIUM SERPL-MCNC: 4.5 MMOL/L — SIGNIFICANT CHANGE UP (ref 3.5–5.3)
POTASSIUM SERPL-SCNC: 4.5 MMOL/L — SIGNIFICANT CHANGE UP (ref 3.5–5.3)

## 2025-02-27 PROCEDURE — 71045 X-RAY EXAM CHEST 1 VIEW: CPT | Mod: 26,76

## 2025-02-27 PROCEDURE — 31624 DX BRONCHOSCOPE/LAVAGE: CPT

## 2025-02-27 PROCEDURE — 71045 X-RAY EXAM CHEST 1 VIEW: CPT | Mod: 26,77

## 2025-02-27 PROCEDURE — 99291 CRITICAL CARE FIRST HOUR: CPT | Mod: 25

## 2025-02-27 RX ORDER — LIDOCAINE HCL/PF 10 MG/ML
10 VIAL (ML) INJECTION ONCE
Refills: 0 | Status: COMPLETED | OUTPATIENT
Start: 2025-02-27 | End: 2025-02-27

## 2025-02-27 RX ORDER — LIDOCAINE HCL/PF 10 MG/ML
10 VIAL (ML) INJECTION ONCE
Refills: 0 | Status: DISCONTINUED | OUTPATIENT
Start: 2025-02-27 | End: 2025-02-27

## 2025-02-27 RX ORDER — FENTANYL CITRATE-0.9 % NACL/PF 100MCG/2ML
25 SYRINGE (ML) INTRAVENOUS ONCE
Refills: 0 | Status: DISCONTINUED | OUTPATIENT
Start: 2025-02-27 | End: 2025-02-27

## 2025-02-27 RX ORDER — ONDANSETRON HCL/PF 4 MG/2 ML
4 VIAL (ML) INJECTION ONCE
Refills: 0 | Status: COMPLETED | OUTPATIENT
Start: 2025-02-27 | End: 2025-02-27

## 2025-02-27 RX ORDER — LIDOCAINE HYDROCHLORIDE 20 MG/ML
10 JELLY TOPICAL ONCE
Refills: 0 | Status: COMPLETED | OUTPATIENT
Start: 2025-02-27 | End: 2025-02-27

## 2025-02-27 RX ORDER — PROPOFOL 10 MG/ML
50 INJECTION, EMULSION INTRAVENOUS ONCE
Refills: 0 | Status: COMPLETED | OUTPATIENT
Start: 2025-02-27 | End: 2025-02-27

## 2025-02-27 RX ORDER — LIDOCAINE HCL/PF 10 MG/ML
8 VIAL (ML) INJECTION ONCE
Refills: 0 | Status: COMPLETED | OUTPATIENT
Start: 2025-02-27 | End: 2025-02-27

## 2025-02-27 RX ORDER — DEXMEDETOMIDINE HYDROCHLORIDE IN SODIUM CHLORIDE 4 UG/ML
1.5 INJECTION INTRAVENOUS
Qty: 200 | Refills: 0 | Status: DISCONTINUED | OUTPATIENT
Start: 2025-02-27 | End: 2025-02-27

## 2025-02-27 RX ORDER — PROPOFOL 10 MG/ML
20 INJECTION, EMULSION INTRAVENOUS ONCE
Refills: 0 | Status: DISCONTINUED | OUTPATIENT
Start: 2025-02-27 | End: 2025-02-27

## 2025-02-27 RX ADMIN — METHOCARBAMOL 500 MILLIGRAM(S): 500 TABLET, FILM COATED ORAL at 06:31

## 2025-02-27 RX ADMIN — Medication 3 MILLILITER(S): at 18:00

## 2025-02-27 RX ADMIN — Medication 3 MILLILITER(S): at 14:41

## 2025-02-27 RX ADMIN — Medication 5 MILLIGRAM(S): at 21:42

## 2025-02-27 RX ADMIN — SODIUM ZIRCONIUM CYCLOSILICATE 10 GRAM(S): 5 POWDER, FOR SUSPENSION ORAL at 06:31

## 2025-02-27 RX ADMIN — Medication 25 MICROGRAM(S): at 12:15

## 2025-02-27 RX ADMIN — Medication 4 MILLIGRAM(S): at 18:20

## 2025-02-27 RX ADMIN — Medication 40 MILLIGRAM(S): at 11:46

## 2025-02-27 RX ADMIN — PROPOFOL 50 MILLIGRAM(S): 10 INJECTION, EMULSION INTRAVENOUS at 17:30

## 2025-02-27 RX ADMIN — INSULIN LISPRO 2: 100 INJECTION, SOLUTION INTRAVENOUS; SUBCUTANEOUS at 12:01

## 2025-02-27 RX ADMIN — MIRTAZAPINE 7.5 MILLIGRAM(S): 30 TABLET, FILM COATED ORAL at 21:43

## 2025-02-27 RX ADMIN — METOPROLOL SUCCINATE 25 MILLIGRAM(S): 50 TABLET, EXTENDED RELEASE ORAL at 06:31

## 2025-02-27 RX ADMIN — Medication 10 MILLILITER(S): at 12:00

## 2025-02-27 RX ADMIN — Medication 1 APPLICATION(S): at 06:50

## 2025-02-27 RX ADMIN — ATORVASTATIN CALCIUM 80 MILLIGRAM(S): 80 TABLET, FILM COATED ORAL at 21:43

## 2025-02-27 RX ADMIN — METOPROLOL SUCCINATE 25 MILLIGRAM(S): 50 TABLET, EXTENDED RELEASE ORAL at 18:22

## 2025-02-27 RX ADMIN — Medication 1 TABLET(S): at 11:45

## 2025-02-27 RX ADMIN — Medication 3 MILLILITER(S): at 23:14

## 2025-02-27 RX ADMIN — LIDOCAINE HYDROCHLORIDE 10 MILLILITER(S): 20 JELLY TOPICAL at 16:15

## 2025-02-27 RX ADMIN — METHOCARBAMOL 500 MILLIGRAM(S): 500 TABLET, FILM COATED ORAL at 21:43

## 2025-02-27 RX ADMIN — AMIODARONE HYDROCHLORIDE 200 MILLIGRAM(S): 50 INJECTION, SOLUTION INTRAVENOUS at 06:30

## 2025-02-27 RX ADMIN — Medication 25 MICROGRAM(S): at 11:45

## 2025-02-27 RX ADMIN — Medication 300 MILLIGRAM(S): at 11:44

## 2025-02-27 RX ADMIN — Medication 1 SPRAY(S): at 18:22

## 2025-02-27 RX ADMIN — Medication 81 MILLIGRAM(S): at 11:45

## 2025-02-27 RX ADMIN — Medication 100 MILLIGRAM(S): at 23:00

## 2025-02-27 RX ADMIN — Medication 500 MILLIGRAM(S): at 11:45

## 2025-02-27 RX ADMIN — Medication 1 APPLICATION(S): at 22:00

## 2025-02-27 NOTE — PROGRESS NOTE ADULT - ASSESSMENT
54M with PMH of HTN, HLD, ESRD on HD MWF via LUE AVF, ascites (requiring repeat paracenteses q4-6wks), NICM with moderate LV dysfunction w/ EF 35-40%(2023) and CAD s/p atherectomy + SALLIE to D1(4/11/2024) presents to [facility] transferred from [facility] for CABG eval  Nephro on board for HD needs.    ESRD on HD   Nephrologist Dr. Bailey  [facility] Schedule / Access:  LUE AVF  Center: [facility]  Pt s/p HD on 2/19 and 2/21.  s/p HD 2/24 uf 1.5L  S/p HD on 02/26 2 L UF  HD tomm per cristin  Keep MAP>65  Renal Diet  HD consent in chart     Hyper/Hypokalemia  Pt intermittently Hyperkalemia  HD as scheduled  now improved  Low K diet.  C/W Lokelma 10gm on nonHD days.  Monitor K.    HTN  BP fluctuating; controlled.  UF w/ HD as BP permits.  Low sodium diet.  Management per ICU  Monitor BP     CKD MBD  Check PTH   Low PO4 diet.  Not on binder.  Monitor Ca , PO4 daily     Anemia  CRISTIANE with HD  Repeat iron studies.  Transfuse if hgb <7    CAD with multivessel disease  CTICU following  s/p CABG 12/30    Hypoxic respiratory failure requiring Trach  Per surgery  S/p bronchoscopy, pulm following

## 2025-02-27 NOTE — PROGRESS NOTE ADULT - SUBJECTIVE AND OBJECTIVE BOX
MR#[id number]  PATIENT NAME:RAAD CANO    DATE OF SERVICE: 02-27-25 @ 06:30  Patient was seen and examined by Solo Quick MD on    02-27-25 @ 06:30 .  Interim events noted.Consultant notes ,Labs,Telemetry reviewed by me       HOSPITAL COURSE: HPI:  54M with PMH of HTN, HLD, ESRD on HD MWF via LUE AVF, ascites (requiring repeat paracenteses q4-6wks), NICM with moderate LV dysfunction w/ EF 35-40%(2023) and CAD s/p atherectomy + SALLIE to D1(4/11/2024) presents to [facility] transferred from [facility]. Patient initially presented to [facility] ED with shortness of breath. Of note he was recently admitted from 09/27-12/02 for acute cholecystitis s/p cholecystectomy. In [facility] of [facility] ED, pt was hypoxic to 86% on RA, trop 1.7K, EKG no new changes, CXR fluid overload. Admitted for AHRF 2/2 fluid overload. Pt received HD on 12/18, 12/19, 12/20 and 12/22. DAPT was resumed, TTE showed improvement in LVEF to 40-45%. Stress test was abnormal with 1mm inferior STD, reversible ischemia in the inferior wall and fixed defects in the lateral, anterior and apical walls with post stress EF of 24%. Pt was then transferred to [facility] for cardiac cath which showed pLAD 70% ISR, mLCx 70%, D1 severe dz. Patient now transferred to [facility] CTS Dr. Rivers for CABG eval. Patient denies any current SOB or chest pain on admission. Otherwise denies headaches, abdominal pain, urinary or bowel changes, fevers, chills, N/V/D, sick contacts.  (24 Dec 2024 05:07)      INTERIM EVENTS:Patient seen at bedside ,interim events noted.  12/23 Cardiac cath  pLAD 70% ISR, mLCx 70%, D1 severe dz.   12/31-POD#1-C3L free LIMA-LAD; SVG-Diag; SVG-leftPL Awake OOB extubated Sinus rhythm on Dobutamine gtt  02/19 Had Bronch yesterday on vent HS remains in Atrial Flutter  02/21-Awake on HFTC at night Ambulating-Atrial Flutter  02/22-Had Midline Remains in Atrial Flutter on TC  02/23-Lying in bed still on NGT feeds on TC-HFTC-40L/30% Atrial Flutter  02/24-Awake Tolerating TC Atrial Flutter~~'s  For HD today  02/26-OOB on TC not dyspneac remains in AFl on Tube feeds  02/27-Had HD remains in AFl on TC      PMH -reviewed admission note, no change since admission              MEDICATIONS  (STANDING):  aMIOdarone    Tablet 200 milliGRAM(s) Oral daily  ascorbic acid 500 milliGRAM(s) Oral daily  aspirin  chewable 81 milliGRAM(s) Oral daily  atorvastatin 80 milliGRAM(s) Oral at bedtime  chlorhexidine 2% Cloths 1 Application(s) Topical daily  chlorhexidine 4% Liquid 1 Application(s) Topical <User Schedule>  dextrose 50% Injectable 50 milliLiter(s) IV Push every 15 minutes  epoetin ignacia (EPOGEN) Injectable 08846 Unit(s) IV Push <User Schedule>  ferrous    sulfate Liquid 300 milliGRAM(s) Enteral Tube daily  heparin  Infusion 1300 Unit(s)/Hr (13.5 mL/Hr) IV Continuous <Continuous>  insulin lispro (ADMELOG) corrective regimen sliding scale   SubCutaneous every 6 hours  melatonin 5 milliGRAM(s) Oral at bedtime  methocarbamol 500 milliGRAM(s) Oral every 8 hours  metoprolol tartrate 25 milliGRAM(s) Oral two times a day  mirtazapine 7.5 milliGRAM(s) Oral at bedtime  Nephro-elicia 1 Tablet(s) Oral daily  pantoprazole  Injectable 40 milliGRAM(s) IV Push daily  sodium chloride 0.65% Nasal 1 Spray(s) Both Nostrils every 12 hours  sodium chloride 0.9%. 1000 milliLiter(s) (10 mL/Hr) IV Continuous <Continuous>  sodium zirconium cyclosilicate 10 Gram(s) Oral <User Schedule>  traZODone 100 milliGRAM(s) Oral at bedtime    MEDICATIONS  (PRN):  acetaminophen     Tablet .. 650 milliGRAM(s) Oral every 6 hours PRN Mild Pain (1 - 3)  lidocaine/prilocaine Cream 1 Application(s) Topical daily PRN pre-HD  sodium chloride 0.9% lock flush 10 milliLiter(s) IV Push every 1 hour PRN Pre/post blood products, medications, blood draw, and to maintain line patency            REVIEW OF SYSTEMS:  Constitutional: [ ] fever, [ ]weight loss,  [ ]fatigue [ ]weight gain  Eyes: [ ] visual changes  Respiratory: [ ]shortness of breath;  [ ] cough, [ ]wheezing, [ ]chills, [ ]hemoptysis  Cardiovascular: [ ] chest pain, [ ]palpitations, [ ]dizziness,  [ ]leg swelling[ ]orthopnea[ ]PND  Gastrointestinal: [ ] abdominal pain, [ ]nausea, [ ]vomiting,  [ ]diarrhea [ ]Constipation [ ]Melena  Genitourinary: [ ] dysuria, [ ] hematuria [ ]Vigil  Neurologic: [ ] headaches [ ] tremors[ ]weakness [ ]Paralysis Right[ ] Left[ ]  Skin: [ ] itching, [ ]burning, [ ] rashes  Endocrine: [ ] heat or cold intolerance  Musculoskeletal: [ ] joint pain or swelling; [ ] muscle, back, or extremity pain  Psychiatric: [ ] depression, [ ]anxiety, [ ]mood swings, or [ ]difficulty sleeping  Hematologic: [ ] easy bruising, [ ] bleeding gums    [ ] All remaining systems negative except as per above.   [ ]Unable to obtain.  [x] No change in ROS since admission      Vital Signs Last 24 Hrs  T(C): 36.6 (27 Feb 2025 00:00), Max: 37 (26 Feb 2025 12:00)  T(F): 97.9 (27 Feb 2025 00:00), Max: 98.6 (26 Feb 2025 12:00)  HR: 77 (27 Feb 2025 05:00) (54 - 101)  BP: 132/57 (27 Feb 2025 05:00) (101/49 - 162/83)  BP(mean): 82 (27 Feb 2025 05:00) (62 - 118)  RR: 25 (27 Feb 2025 05:00) (10 - 29)  SpO2: 97% (27 Feb 2025 05:00) (92% - 100%)    Parameters below as of 27 Feb 2025 04:00  Patient On (Oxygen Delivery Method): tracheostomy collar    O2 Concentration (%): 40  I&O's Summary    25 Feb 2025 07:01  -  26 Feb 2025 07:00  --------------------------------------------------------  IN: 1803.5 mL / OUT: 0 mL / NET: 1803.5 mL    26 Feb 2025 07:01  -  27 Feb 2025 06:30  --------------------------------------------------------  IN: 2495 mL / OUT: 2600 mL / NET: -105 mL        PHYSICAL EXAM:  General: No acute distress BMI-  HEENT: EOMI, PERRL  Neck: Supple, [ ] JVD  Lungs: Equal air entry bilaterally; [ ] rales [ ] wheezing [ ] rhonchi  Heart: Regular rate and rhythm; [x ] murmur   2/6 [ x] systolic [ ] diastolic [ ] radiation[ ] rubs [ ]  gallops  Abdomen: Nontender, bowel sounds present  Extremities: No clubbing, cyanosis, [ ] edema [ ]Pulses  equal and intact  Nervous system:  Alert & Oriented X3, no focal deficits  Psychiatric: Normal affect  Skin: No rashes or lesions    LABS:  02-26    136  |  97  |  25[H]  ----------------------------<  153[H]  4.5   |  30  |  3.45[H]    Ca    9.0      26 Feb 2025 23:40  Phos  3.5     02-26  Mg     2.3     02-26    TPro  6.7  /  Alb  2.9[L]  /  TBili  0.3  /  DBili  x   /  AST  14  /  ALT  9[L]  /  AlkPhos  101  02-26    Creatinine Trend: 3.45<--, 5.17<--, 4.17<--, 5.72<--, 4.86<--, 3.85<--                        7.6    8.78  )-----------( 266      ( 26 Feb 2025 23:40 )             24.2     PT/INR - ( 26 Feb 2025 06:48 )   PT: 14.1 sec;   INR: 1.23 ratio         PTT - ( 26 Feb 2025 23:40 )  PTT:48.7 sec          US Abdomen Limited (02.25.25 @ 15:56) >  IMPRESSION:  Moderate volume ascites seen in all 4 quadrants. Deepest pocket at the  right lower quadrant.    Right pleural effusion.           Xray Chest 1 View- PORTABLE-Urgent (Xray Chest 1 View- PORTABLE-Urgent .) (02.23.25 @ 13:13) >  COMPARISON: Chest x-ray 2/23/2025.    FINDINGS:  Enteric tube with tip looping inside the stomach.  Median sternotomy wires, sternal surgical clips.  The heart is not accurately assessed in this AP projection.  Decreased pulmonary vascular congestion.  Right lower lung opacity adjacent to the cardiophrenic angle.  There is no pneumothorax or large pleural effusion.  No acute bony abnormality.    IMPRESSION:  Pulmonary vascular congestion, decreased.  Right lowerlung opacity adjacent to the cardiophrenic angle.       TTE Limited W or WO Ultrasound Enhancing Agent (02.18.25 @ 13:54) >  CONCLUSIONS:      1. Left ventricular systolic function is normal.   2. Enlarged right ventricular cavity size and mildly reduced right ventricular systolic function.   3. Left pleural effusion noted.   4. Trace pericardial effusion.

## 2025-02-27 NOTE — PROCEDURE NOTE - NSBRONCHPROCDETAILS_GEN_A_CORE_FT
Preop medication: Dex, Prop    Findings:  Bronchoscope inserted through ETT. ETT noted to be in good position. Airway evaluation revealed Sharp Abena. Secretions in L mainstem bronchus, cleared with patient cough. AUGUSTO, LLL clear following secretion clearance. Bronchoscope then withdrawn from ETT. Minimal bleeding noted    Specimens: BAL sent for culture

## 2025-02-27 NOTE — PROCEDURE NOTE - NSPROCNAME_GEN_A_CORE
Arterial Puncture/Cannulation
Bronchoscopy
General
Bronchoscopy
Bronchoscopy
Paracentesis
Bronchoscopy
Central Line Insertion
Central Line Insertion
Arterial Puncture/Cannulation
Bronchoscopy
Incision & Drainage
Procedural Sedation
Bronchoscopy
General
Debridement
Paracentesis
Tracheal Intubation
Bronchoscopy
Bronchoscopy
Cardioversion
Procedural Sedation
Midline Insertion
Central Line Insertion

## 2025-02-27 NOTE — PROGRESS NOTE ADULT - SUBJECTIVE AND OBJECTIVE BOX
[facility]    Dr. Nica Styles     Office [phone] (9 am to 5 pm)  Service: [phone] (5pm to 9am)  Also Available on TEAMS      RENAL PROGRESS NOTE: DATE OF SERVICE 02-27-25 @ 08:35    Patient is a 54y old  Male who presents with a chief complaint of CAD s/p CABG (27 Feb 2025 06:29)      Patient seen and examined at bedside. No chest pain/sob    VITALS:  T(F): 97.8 (02-27-25 @ 08:00), Max: 98.6 (02-26-25 @ 12:00)  HR: 82 (02-27-25 @ 08:00)  BP: 142/58 (02-27-25 @ 08:00)  RR: 17 (02-27-25 @ 08:00)  SpO2: 97% (02-27-25 @ 08:00)  Wt(kg): --    02-26 @ 07:01  -  02-27 @ 07:00  --------------------------------------------------------  IN: 2495 mL / OUT: 2600 mL / NET: -105 mL          PHYSICAL EXAM:  Constitutional: NAD  Neck: No JVD  Respiratory: CTAB, no wheezes, rales or rhonchi  Cardiovascular: S1, S2, RRR  Gastrointestinal: BS+, soft, NT/ND  Extremities: No peripheral edema    Hospital Medications:   MEDICATIONS  (STANDING):  aMIOdarone    Tablet 200 milliGRAM(s) Oral daily  ascorbic acid 500 milliGRAM(s) Oral daily  aspirin  chewable 81 milliGRAM(s) Oral daily  atorvastatin 80 milliGRAM(s) Oral at bedtime  chlorhexidine 2% Cloths 1 Application(s) Topical daily  chlorhexidine 4% Liquid 1 Application(s) Topical <User Schedule>  dextrose 50% Injectable 50 milliLiter(s) IV Push every 15 minutes  epoetin ignacia (EPOGEN) Injectable 76106 Unit(s) IV Push <User Schedule>  ferrous    sulfate Liquid 300 milliGRAM(s) Enteral Tube daily  heparin  Infusion 1300 Unit(s)/Hr (13.5 mL/Hr) IV Continuous <Continuous>  insulin lispro (ADMELOG) corrective regimen sliding scale   SubCutaneous every 6 hours  melatonin 5 milliGRAM(s) Oral at bedtime  methocarbamol 500 milliGRAM(s) Oral every 8 hours  metoprolol tartrate 25 milliGRAM(s) Oral two times a day  mirtazapine 7.5 milliGRAM(s) Oral at bedtime  Nephro-elicia 1 Tablet(s) Oral daily  pantoprazole  Injectable 40 milliGRAM(s) IV Push daily  sodium chloride 0.65% Nasal 1 Spray(s) Both Nostrils every 12 hours  sodium chloride 0.9%. 1000 milliLiter(s) (10 mL/Hr) IV Continuous <Continuous>  sodium zirconium cyclosilicate 10 Gram(s) Oral <User Schedule>  traZODone 100 milliGRAM(s) Oral at bedtime      LABS:  02-26    136  |  97  |  25[H]  ----------------------------<  153[H]  4.5   |  30  |  3.45[H]    Ca    9.0      26 Feb 2025 23:40  Phos  3.5     02-26  Mg     2.3     02-26    TPro  6.7  /  Alb  2.9[L]  /  TBili  0.3  /  DBili      /  AST  14  /  ALT  9[L]  /  AlkPhos  101  02-26    Creatinine Trend: 3.45 <--, 5.17 <--, 4.17 <--, 5.72 <--, 4.86 <--, 3.85 <--, 5.45 <--    Albumin: 2.9 g/dL (02-26 @ 23:40)  Phosphorus: 3.5 mg/dL (02-26 @ 23:40)                              7.6    8.78  )-----------( 266      ( 26 Feb 2025 23:40 )             24.2     Urine Studies:  Urinalysis - [02-26-25 @ 23:40]      Color  / Appearance  / SG  / pH       Gluc 153 / Ketone   / Bili  / Urobili        Blood  / Protein  / Leuk Est  / Nitrite       RBC  / WBC  / Hyaline  / Gran  / Sq Epi  / Non Sq Epi  / Bacteria       Iron 29, TIBC 143, %sat 20      [12-19-24 @ 05:00]  Ferritin 904      [12-19-24 @ 05:00]  TSH 4.75      [12-24-24 @ 06:50]        RADIOLOGY & ADDITIONAL STUDIES:

## 2025-02-27 NOTE — PROGRESS NOTE ADULT - ASSESSMENT
54M with PMH of HTN, HLD, ESRD on HD MWF via LUE AVF, ascites (requiring repeat paracenteses q4-6wks), NICM with moderate LV dysfunction w/ EF 35-40%() and CAD s/p atherectomy + SALLIE to D1(2024) presents to [facility] transferred from [facility].  Cardiac cath which showed pLAD 70% ISR, mLCx 70%, D1 severe dz.   Patient now transferred to [facility] CTS Dr. Rivers for CABG eval      # CAD s/p NSTEMI  Hx CAD s/p PCI LAD   Presented with ADHF elevated troponins  Positive NST1-Cath- pLAD 70% ISR, mLCx 70%, D1 severe dz.   TTE EF 35% Severe TRWas on Plavix-p2y12- 321 been off Plavix since -POD#1-C3L free LIMA-LAD; SVG-Diag; MFK-yuifDS-Cmffoxaku on  Sinus rhythm,Amio for AF prophylaxis   Atrial Flutter on TC during day Ambulating  -Increased congestion on CXR had HD yesterday remains in AFl   -Atrial Flutter HFTC 40L/30% BP stable     -Atrial Flutter tolerating TC for HD MWF-consider DOAC   OOB Abd US moderate 4 quad ascites noted remains in Atrial Flutter~~70's   TC Atrial Flutter       # Acute on Chronic Systolic Heart Failure now improved  EF-35% ,severe TR  Had HD post op  GDMT post op  LVEF improved post bypass   -TTE EF 40%,trace effusion  ECG c/w pericarditis-was on colchicine   01/15-TTE EF 55%  -TTE EF 60%   -Normal LV systolic function Moderate pericardial effusion  -On TC-HF and Vent support at nights   02/10-Vent HS with T Collar trial Ambulated Remains in AF  Repeat TTE Normal LV Systolic function Small Pericardial effusion decreased from 2/3        Vascular evaluated  AVGraft /?steal causing RV failure-'' Very low suspicion of steal Sd and AVF causing current clinical state. ''   VA Duplex Hemodialysis Access, Left (25 @ 13:35) >   Arterial flow volume of 1301 mL/m, and the venous flow volume of  1492 mL/m are consistent with a normally functioning dialysis access  fistula.      # Ascites  Hx chronic ascites  Has drainage q4-6 weeks  Last drained -4600mL  ? ascites related to severe TR  Had durqkuxksept-Olceuzg-fd growth   CT Abdomen 01/10-Large volume ascites-  US abdomen--Small to moderate volume abdominal/pelvic ascites  US abdomen  Moderate ascites  US Abdomen -Moderate 4 quad ascites    # ESRD  Now on  HD-MWF

## 2025-02-27 NOTE — PROCEDURE NOTE - NSINFORMCONSENT_GEN_A_CORE
Benefits, risks, and possible complications of procedure explained to patient/caregiver who verbalized understanding and gave written consent.
Benefits, risks, and possible complications of procedure explained to patient/caregiver who verbalized understanding and gave written consent.
Patient's wife/Benefits, risks, and possible complications of procedure explained to patient/caregiver who verbalized understanding and gave verbal consent.
This was an emergent procedure.
This was an emergent procedure.
Benefits, risks, and possible complications of procedure explained to patient/caregiver who verbalized understanding and gave verbal consent.
Benefits, risks, and possible complications of procedure explained to patient/caregiver who verbalized understanding and gave verbal consent.
Benefits, risks, and possible complications of procedure explained to patient/caregiver who verbalized understanding and gave written consent.
This was an emergent procedure.
Benefits, risks, and possible complications of procedure explained to patient/caregiver who verbalized understanding and gave verbal consent.
This was an emergent procedure.
Benefits, risks, and possible complications of procedure explained to patient/caregiver who verbalized understanding and gave written consent.
Benefits, risks, and possible complications of procedure explained to patient/caregiver who verbalized understanding and gave verbal consent.
Benefits, risks, and possible complications of procedure explained to patient/caregiver who verbalized understanding and gave verbal consent.
Benefits, risks, and possible complications of procedure explained to patient/caregiver who verbalized understanding and gave written consent.
Benefits, risks, and possible complications of procedure explained to patient/caregiver who verbalized understanding and gave verbal consent.
as witnessed by bedside nurse Deandre/Benefits, risks, and possible complications of procedure explained to patient/caregiver who verbalized understanding and gave verbal consent.
Partner Sofie Figeruoa/Benefits, risks, and possible complications of procedure explained to patient/caregiver who verbalized understanding and gave verbal consent.
Benefits, risks, and possible complications of procedure explained to patient/caregiver who verbalized understanding and gave written consent.
obtain via phone (patient wife)/Benefits, risks, and possible complications of procedure explained to patient/caregiver who verbalized understanding and gave written consent.
Benefits, risks, and possible complications of procedure explained to patient/caregiver who verbalized understanding and gave written consent.
Benefits, risks, and possible complications of procedure explained to patient/caregiver who verbalized understanding and gave verbal consent.
Benefits, risks, and possible complications of procedure explained to patient/caregiver who verbalized understanding and gave verbal consent.
Benefits, risks, and possible complications of procedure explained to patient/caregiver who verbalized understanding and gave written consent.
This was an emergent procedure.

## 2025-02-28 LAB
ALBUMIN SERPL ELPH-MCNC: 2.8 G/DL — LOW (ref 3.3–5)
ALP SERPL-CCNC: 91 U/L — SIGNIFICANT CHANGE UP (ref 40–120)
ALT FLD-CCNC: 9 U/L — LOW (ref 10–45)
ANION GAP SERPL CALC-SCNC: 12 MMOL/L — SIGNIFICANT CHANGE UP (ref 5–17)
AST SERPL-CCNC: 17 U/L — SIGNIFICANT CHANGE UP (ref 10–40)
BASOPHILS # BLD AUTO: 0.04 K/UL — SIGNIFICANT CHANGE UP (ref 0–0.2)
BASOPHILS NFR BLD AUTO: 0.5 % — SIGNIFICANT CHANGE UP (ref 0–2)
BILIRUB SERPL-MCNC: 0.4 MG/DL — SIGNIFICANT CHANGE UP (ref 0.2–1.2)
BUN SERPL-MCNC: 35 MG/DL — HIGH (ref 7–23)
CALCIUM SERPL-MCNC: 9.1 MG/DL — SIGNIFICANT CHANGE UP (ref 8.4–10.5)
CHLORIDE SERPL-SCNC: 95 MMOL/L — LOW (ref 96–108)
CO2 SERPL-SCNC: 29 MMOL/L — SIGNIFICANT CHANGE UP (ref 22–31)
EGFR: 15 ML/MIN/1.73M2 — LOW
EGFR: 15 ML/MIN/1.73M2 — LOW
EOSINOPHIL NFR BLD AUTO: 8.1 % — HIGH (ref 0–6)
FIBRINOGEN PPP-MCNC: 265 MG/DL — SIGNIFICANT CHANGE UP (ref 200–445)
GLUCOSE SERPL-MCNC: 104 MG/DL — HIGH (ref 70–99)
GRAM STN FLD: ABNORMAL
HCT VFR BLD CALC: 24.8 % — LOW (ref 39–50)
HGB BLD-MCNC: 7.9 G/DL — LOW (ref 13–17)
IMM GRANULOCYTES NFR BLD AUTO: 0.8 % — SIGNIFICANT CHANGE UP (ref 0–0.9)
INR BLD: 1.19 RATIO — HIGH (ref 0.85–1.16)
LYMPHOCYTES # BLD AUTO: 0.83 K/UL — LOW (ref 1–3.3)
MAGNESIUM SERPL-MCNC: 2.4 MG/DL — SIGNIFICANT CHANGE UP (ref 1.6–2.6)
MCHC RBC-ENTMCNC: 31.5 PG — SIGNIFICANT CHANGE UP (ref 27–34)
MCHC RBC-ENTMCNC: 31.9 G/DL — LOW (ref 32–36)
MCV RBC AUTO: 98.8 FL — SIGNIFICANT CHANGE UP (ref 80–100)
MONOCYTES # BLD AUTO: 0.9 K/UL — SIGNIFICANT CHANGE UP (ref 0–0.9)
MONOCYTES NFR BLD AUTO: 10.5 % — SIGNIFICANT CHANGE UP (ref 2–14)
NEUTROPHILS # BLD AUTO: 6.05 K/UL — SIGNIFICANT CHANGE UP (ref 1.8–7.4)
NEUTROPHILS NFR BLD AUTO: 70.4 % — SIGNIFICANT CHANGE UP (ref 43–77)
PHOSPHATE SERPL-MCNC: 4.5 MG/DL — SIGNIFICANT CHANGE UP (ref 2.5–4.5)
PLATELET # BLD AUTO: 263 K/UL — SIGNIFICANT CHANGE UP (ref 150–400)
POTASSIUM SERPL-SCNC: 5.4 MMOL/L — HIGH (ref 3.5–5.3)
PROT SERPL-MCNC: 6.6 G/DL — SIGNIFICANT CHANGE UP (ref 6–8.3)
PROTHROM AB SERPL-ACNC: 13.7 SEC — HIGH (ref 9.9–13.4)
RBC # BLD: 2.51 M/UL — LOW (ref 4.2–5.8)
RBC # FLD: 21.5 % — HIGH (ref 10.3–14.5)
SODIUM SERPL-SCNC: 136 MMOL/L — SIGNIFICANT CHANGE UP (ref 135–145)
SPECIMEN SOURCE: SIGNIFICANT CHANGE UP
WBC # BLD: 8.59 K/UL — SIGNIFICANT CHANGE UP (ref 3.8–10.5)
WBC # FLD AUTO: 8.59 K/UL — SIGNIFICANT CHANGE UP (ref 3.8–10.5)

## 2025-02-28 PROCEDURE — 71045 X-RAY EXAM CHEST 1 VIEW: CPT | Mod: 26,76

## 2025-02-28 PROCEDURE — 99291 CRITICAL CARE FIRST HOUR: CPT

## 2025-02-28 RX ORDER — ACETAMINOPHEN 500 MG/5ML
1000 LIQUID (ML) ORAL ONCE
Refills: 0 | Status: COMPLETED | OUTPATIENT
Start: 2025-02-28 | End: 2025-02-28

## 2025-02-28 RX ORDER — METOPROLOL SUCCINATE 50 MG/1
25 TABLET, EXTENDED RELEASE ORAL ONCE
Refills: 0 | Status: COMPLETED | OUTPATIENT
Start: 2025-02-28 | End: 2025-02-28

## 2025-02-28 RX ORDER — OXYCODONE HYDROCHLORIDE 30 MG/1
10 TABLET ORAL ONCE
Refills: 0 | Status: DISCONTINUED | OUTPATIENT
Start: 2025-02-28 | End: 2025-02-28

## 2025-02-28 RX ADMIN — Medication 1000 MILLIGRAM(S): at 17:15

## 2025-02-28 RX ADMIN — Medication 81 MILLIGRAM(S): at 14:41

## 2025-02-28 RX ADMIN — Medication 1 SPRAY(S): at 05:23

## 2025-02-28 RX ADMIN — Medication 3 MILLILITER(S): at 17:48

## 2025-02-28 RX ADMIN — MIRTAZAPINE 7.5 MILLIGRAM(S): 30 TABLET, FILM COATED ORAL at 21:39

## 2025-02-28 RX ADMIN — METHOCARBAMOL 500 MILLIGRAM(S): 500 TABLET, FILM COATED ORAL at 21:39

## 2025-02-28 RX ADMIN — Medication 40 MILLIGRAM(S): at 12:17

## 2025-02-28 RX ADMIN — Medication 100 MILLIGRAM(S): at 21:39

## 2025-02-28 RX ADMIN — OXYCODONE HYDROCHLORIDE 10 MILLIGRAM(S): 30 TABLET ORAL at 23:30

## 2025-02-28 RX ADMIN — METOPROLOL SUCCINATE 25 MILLIGRAM(S): 50 TABLET, EXTENDED RELEASE ORAL at 05:23

## 2025-02-28 RX ADMIN — Medication 1 APPLICATION(S): at 21:45

## 2025-02-28 RX ADMIN — EPOETIN ALFA 10000 UNIT(S): 10000 SOLUTION INTRAVENOUS; SUBCUTANEOUS at 18:48

## 2025-02-28 RX ADMIN — Medication 3 MILLILITER(S): at 23:01

## 2025-02-28 RX ADMIN — OXYCODONE HYDROCHLORIDE 10 MILLIGRAM(S): 30 TABLET ORAL at 09:30

## 2025-02-28 RX ADMIN — OXYCODONE HYDROCHLORIDE 10 MILLIGRAM(S): 30 TABLET ORAL at 09:00

## 2025-02-28 RX ADMIN — Medication 400 MILLIGRAM(S): at 16:45

## 2025-02-28 RX ADMIN — METHOCARBAMOL 500 MILLIGRAM(S): 500 TABLET, FILM COATED ORAL at 05:22

## 2025-02-28 RX ADMIN — AMIODARONE HYDROCHLORIDE 200 MILLIGRAM(S): 50 INJECTION, SOLUTION INTRAVENOUS at 05:23

## 2025-02-28 RX ADMIN — Medication 500 MILLIGRAM(S): at 14:40

## 2025-02-28 RX ADMIN — METHOCARBAMOL 500 MILLIGRAM(S): 500 TABLET, FILM COATED ORAL at 14:41

## 2025-02-28 RX ADMIN — Medication 5 MILLIGRAM(S): at 21:39

## 2025-02-28 RX ADMIN — Medication 1 TABLET(S): at 14:40

## 2025-02-28 RX ADMIN — METOPROLOL SUCCINATE 25 MILLIGRAM(S): 50 TABLET, EXTENDED RELEASE ORAL at 20:18

## 2025-02-28 RX ADMIN — ATORVASTATIN CALCIUM 80 MILLIGRAM(S): 80 TABLET, FILM COATED ORAL at 21:40

## 2025-02-28 RX ADMIN — Medication 3 MILLILITER(S): at 11:31

## 2025-02-28 RX ADMIN — Medication 3 MILLILITER(S): at 05:35

## 2025-02-28 RX ADMIN — Medication 300 MILLIGRAM(S): at 14:40

## 2025-02-28 RX ADMIN — Medication 1 APPLICATION(S): at 05:27

## 2025-02-28 RX ADMIN — OXYCODONE HYDROCHLORIDE 10 MILLIGRAM(S): 30 TABLET ORAL at 22:59

## 2025-02-28 NOTE — DISCHARGE NOTE NURSING/CASE MANAGEMENT/SOCIAL WORK - NSTOBACCONEVERSMOKERY/N_GEN_A
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  No

## 2025-02-28 NOTE — PROGRESS NOTE ADULT - SUBJECTIVE AND OBJECTIVE BOX
MR#[id number]  PATIENT NAME:RAAD CANO    DATE OF SERVICE: 02-28-25 @ 07:17  Patient was seen and examined by Solo Quick MD on    02-28-25 @ 07:17 .  Interim events noted.Consultant notes ,Labs,Telemetry reviewed by me       HOSPITAL COURSE: HPI:  54M with PMH of HTN, HLD, ESRD on HD MWF via LUE AVF, ascites (requiring repeat paracenteses q4-6wks), NICM with moderate LV dysfunction w/ EF 35-40%(2023) and CAD s/p atherectomy + SALLIE to D1(4/11/2024) presents to [facility] transferred from [facility]. Patient initially presented to [facility] ED with shortness of breath. Of note he was recently admitted from 09/27-12/02 for acute cholecystitis s/p cholecystectomy. In [facility] ED, pt was hypoxic to 86% on RA, trop 1.7K, EKG no new changes, CXR fluid overload. Admitted for AHRF 2/2 fluid overload. Pt received HD on 12/18, 12/19, 12/20 and 12/22. DAPT was resumed, TTE showed improvement in LVEF to 40-45%. Stress test was abnormal with 1mm inferior STD, reversible ischemia in the inferior wall and fixed defects in the lateral, anterior and apical walls with post stress EF of 24%. Pt was then transferred to [facility] for cardiac cath which showed pLAD 70% ISR, mLCx 70%, D1 severe dz. Patient now transferred to [facility] CTS Dr. Rivers for CABG eval. Patient denies any current SOB or chest pain on admission. Otherwise denies headaches, abdominal pain, urinary or bowel changes, fevers, chills, N/V/D, sick contacts.  (24 Dec 2024 05:07)      INTERIM EVENTS:Patient seen at bedside ,interim events noted.    12/23 Cardiac cath  pLAD 70% ISR, mLCx 70%, D1 severe dz.   12/31-POD#1-C3L free LIMA-LAD; SVG-Diag; SVG-leftPL Awake OOB extubated Sinus rhythm on Dobutamine gtt  02/19 Had Bronch yesterday on vent HS remains in Atrial Flutter  02/21-Awake on HFTC at night Ambulating-Atrial Flutter  02/22-Had Midline Remains in Atrial Flutter on TC  02/23-Lying in bed still on NGT feeds on TC-HFTC-40L/30% Atrial Flutter  02/24-Awake Tolerating TC Atrial Flutter~~'s  For HD today  02/26-OOB on TC not dyspneac remains in AFl on Tube feeds  02/27-Had HD remains in AFl on TC    02/28 Awake had Bronch Trach downsized to 7 AFl          MEDICATIONS  (STANDING):  aMIOdarone    Tablet 200 milliGRAM(s) Oral daily  ascorbic acid 500 milliGRAM(s) Oral daily  aspirin  chewable 81 milliGRAM(s) Oral daily  atorvastatin 80 milliGRAM(s) Oral at bedtime  chlorhexidine 2% Cloths 1 Application(s) Topical daily  chlorhexidine 4% Liquid 1 Application(s) Topical <User Schedule>  dextrose 50% Injectable 50 milliLiter(s) IV Push every 15 minutes  epoetin ignacia (EPOGEN) Injectable 53368 Unit(s) IV Push <User Schedule>  ferrous    sulfate Liquid 300 milliGRAM(s) Enteral Tube daily  heparin  Infusion 1300 Unit(s)/Hr (13.5 mL/Hr) IV Continuous <Continuous>  insulin lispro (ADMELOG) corrective regimen sliding scale   SubCutaneous every 6 hours  melatonin 5 milliGRAM(s) Oral at bedtime  methocarbamol 500 milliGRAM(s) Oral every 8 hours  metoprolol tartrate 25 milliGRAM(s) Oral two times a day  mirtazapine 7.5 milliGRAM(s) Oral at bedtime  Nephro-elicia 1 Tablet(s) Oral daily  pantoprazole  Injectable 40 milliGRAM(s) IV Push daily  sodium chloride 0.65% Nasal 1 Spray(s) Both Nostrils every 12 hours  sodium chloride 0.9% for Nebulization 3 milliLiter(s) Nebulizer every 6 hours  sodium chloride 0.9%. 1000 milliLiter(s) (10 mL/Hr) IV Continuous <Continuous>  sodium zirconium cyclosilicate 10 Gram(s) Oral <User Schedule>  traZODone 100 milliGRAM(s) Oral at bedtime    MEDICATIONS  (PRN):  acetaminophen     Tablet .. 650 milliGRAM(s) Oral every 6 hours PRN Mild Pain (1 - 3)  lidocaine/prilocaine Cream 1 Application(s) Topical daily PRN pre-HD  sodium chloride 0.9% lock flush 10 milliLiter(s) IV Push every 1 hour PRN Pre/post blood products, medications, blood draw, and to maintain line patency            REVIEW OF SYSTEMS:  Constitutional: [ ] fever, [ ]weight loss,  [ ]fatigue [ ]weight gain  Eyes: [ ] visual changes  Respiratory: [ ]shortness of breath;  [ ] cough, [ ]wheezing, [ ]chills, [ ]hemoptysis  Cardiovascular: [ ] chest pain, [ ]palpitations, [ ]dizziness,  [ ]leg swelling[ ]orthopnea[ ]PND  Gastrointestinal: [ ] abdominal pain, [ ]nausea, [ ]vomiting,  [ ]diarrhea [ ]Constipation [ ]Melena  Genitourinary: [ ] dysuria, [ ] hematuria [ ]Vigil  Neurologic: [ ] headaches [ ] tremors[ ]weakness [ ]Paralysis Right[ ] Left[ ]  Skin: [ ] itching, [ ]burning, [ ] rashes  Endocrine: [ ] heat or cold intolerance  Musculoskeletal: [ ] joint pain or swelling; [ ] muscle, back, or extremity pain  Psychiatric: [ ] depression, [ ]anxiety, [ ]mood swings, or [ ]difficulty sleeping  Hematologic: [ ] easy bruising, [ ] bleeding gums    [ ] All remaining systems negative except as per above.   [ ]Unable to obtain.  [x] No change in ROS since admission      Vital Signs Last 24 Hrs  T(C): 36.6 (28 Feb 2025 04:00), Max: 36.7 (27 Feb 2025 12:00)  T(F): 97.8 (28 Feb 2025 04:00), Max: 98 (27 Feb 2025 12:00)  HR: 72 (28 Feb 2025 07:00) (62 - 100)  BP: 125/53 (28 Feb 2025 07:00) (111/45 - 188/77)  BP(mean): 77 (28 Feb 2025 07:00) (65 - 110)  RR: 21 (28 Feb 2025 07:00) (12 - 28)  SpO2: 99% (28 Feb 2025 07:00) (95% - 100%)    Parameters below as of 28 Feb 2025 05:29  Patient On (Oxygen Delivery Method): tracheostomy collar      I&O's Summary    27 Feb 2025 07:01  -  28 Feb 2025 07:00  --------------------------------------------------------  IN: 330.5 mL / OUT: 0 mL / NET: 330.5 mL        PHYSICAL EXAM:  General: No acute distress BMI-28  HEENT: EOMI, PERRL  Neck: Supple, [ ] JVD[x] Trach  Lungs: Equal air entry bilaterally; [ ] rales [ ] wheezing [ ] rhonchi  Heart: Regular rate and rhythm; [x ] murmur   2/6 [ x] systolic [ ] diastolic [ ] radiation[ ] rubs [ ]  gallops  Abdomen: Nontender, bowel sounds present  Extremities: No clubbing, cyanosis, [ ] edema [ ]Pulses  equal and intact  Nervous system:  Alert & Oriented X3, no focal deficits  Psychiatric: Normal affect  Skin: No rashes or lesions    LABS:  02-28    136  |  95[L]  |  35[H]  ----------------------------<  104[H]  5.4[H]   |  29  |  4.51[H]    Ca    9.1      28 Feb 2025 00:29  Phos  4.5     02-28  Mg     2.4     02-28    TPro  6.6  /  Alb  2.8[L]  /  TBili  0.4  /  DBili  x   /  AST  17  /  ALT  9[L]  /  AlkPhos  91  02-28    Creatinine Trend: 4.51<--, 3.45<--, 5.17<--, 4.17<--, 5.72<--, 4.86<--                        7.9    8.59  )-----------( 263      ( 28 Feb 2025 00:29 )             24.8     PT/INR - ( 28 Feb 2025 00:34 )   PT: 13.7 sec;   INR: 1.19 ratio         PTT - ( 28 Feb 2025 00:34 )  PTT:32.6 sec         Xray Chest 1 View- PORTABLE-Urgent (Xray Chest 1 View- PORTABLE-Urgent .) (02.27.25 @ 07:35) >  COMPARISON: Chest x-ray 2/26/2023).    FINDINGS:  2/26/2025 6:05 AM:  Patient is status post sternotomy. Enteric tube courses over the   diaphragm with tip overlying the stomach.  Midline sternotomy wires.  The heart is unchanged in size. There are perihilar interstitial   opacities. Small left pleural effusion. There is no pneumothorax.    02/27/2025 6:33 AM:.  Worsening opacification the left hemithorax.  There is no pneumothorax.    IMPRESSION:  Worsening opacification of the left hemithorax with minimal residual  aeration of the left upper lobe.      US Abdomen Limited (02.25.25 @ 15:56) >  IMPRESSION:  Moderate volume ascites seen in all 4 quadrants. Deepest pocket at the  right lower quadrant.    Right pleural effusion.     TTE Limited W or WO Ultrasound Enhancing Agent (02.18.25 @ 13:54) >  CONCLUSIONS:      1. Left ventricular systolic function is normal.   2. Enlarged right ventricular cavity size and mildly reduced right ventricular systolic function.   3. Left pleural effusion noted.   4. Trace pericardial effusion.

## 2025-02-28 NOTE — PROGRESS NOTE ADULT - SUBJECTIVE AND OBJECTIVE BOX
Brief History:  53 yo M with multiple medical problems including CAD s/p PCI in 2024 s/p CABG x 3 on 24    1: Intubated for hypoxia & left lung white out s/p awake bronch with removal of copious mucopurulent secretions  : s/p IABP insertion, sepsis/septic shock due to E coli   ESBL pneumonia, collapsed Left Lung, s/p multiple bronch    tracheostomy tube placement    VRE E. Faecium Bcx   +HSV PCR BAL   tissue cx from back abscess - Serratia/MRSA  - - intermittent bradycardia/junctional episodes with hypotension  2/3: off , off theophylline. iHD 1L UF  : bronch for Left lung collapse wound vac, 2decho w/ moderate pericardial effusion - similar as before, US abd: small - moderate ascites - monitor at this time.  Wound vac placed for sacral wounds  : iHD-1L UF  : bronch today off positive pressure   : concern for left food drop   2/10 : no acute issues - CXR shows improving left sided aeration, pt subjectively reports having difficulty while lying flat  : s/p Paracentesis 3.5 L removed, leukocytosis   : Ascites fluid PMN < 250 (less likely SBP)   sniff test yesterday showed paradoxical diaphragmatic motion    : mucus plugging but able to clear airway with aggressive pulmonary toileting.   : no vent requirement overnight, able to mobilize secretion with pulm toileting  hyperkalemia post HD - workup for possible recirculation underway   : On TC X 48 hours - ambulating well, no mucus plugging events  : HFTC x24hrs (40L 30%)  : iHD 1.5L UF  : iHD 2L  : trach downsized to 7, bronch today    ICU Vital Signs Last 24 Hrs  T(C): 36.6 (25 @ 04:00), Max: 36.7 (25 @ 12:00)  T(F): 97.8 (25 @ 04:00), Max: 98 (25 @ 12:00)  HR: 73 (25 @ 05:29) (62 - 100)  BP: 128/59 (25 @ 05:00) (111/45 - 188/77)  BP(mean): 85 (25 @ 05:00) (65 - 110)  ABP: --  ABP(mean): --  RR: 12 (25 @ 05:00) (12 - 28)  SpO2: 100% (25 @ 05:29) (95% - 100%)    I&O's Summary    2025 07:01  -  2025 07:00  --------------------------------------------------------  IN: 2662 mL / OUT: 2600 mL / NET: 62 mL    2025 07:01  -  2025 06:57  --------------------------------------------------------  IN: 330.5 mL / OUT: 0 mL / NET: 330.5 mL                       7.9    8.59  )-----------( 263      ( 2025 00:29 )             24.8     2025 00:29    136    |  95     |  35     ----------------------------<  104    5.4     |  29     |  4.51     Ca    9.1        2025 00:29  Phos  4.5       2025 00:29  Mg     2.4       2025 00:29    TPro  6.6    /  Alb  2.8    /  TBili  0.4    /  DBili  x      /  AST  17     /  ALT  9      /  AlkPhos  91     2025 00:29    PT/INR - ( 2025 00:34 )   PT: 13.7 sec;   INR: 1.19 ratio       PTT - ( 2025 00:34 )  PTT:32.6 sec    MEDICATIONS  (STANDING):  aMIOdarone    Tablet 200 milliGRAM(s) Oral daily  ascorbic acid 500 milliGRAM(s) Oral daily  aspirin  chewable 81 milliGRAM(s) Oral daily  atorvastatin 80 milliGRAM(s) Oral at bedtime  chlorhexidine 2% Cloths 1 Application(s) Topical daily  chlorhexidine 4% Liquid 1 Application(s) Topical <User Schedule>  dextrose 50% Injectable 50 milliLiter(s) IV Push every 15 minutes  epoetin ignacia (EPOGEN) Injectable 91762 Unit(s) IV Push <User Schedule>  ferrous    sulfate Liquid 300 milliGRAM(s) Enteral Tube daily  heparin  Infusion 1300 Unit(s)/Hr IV Continuous <Continuous>  insulin lispro (ADMELOG) corrective regimen sliding scale   SubCutaneous every 6 hours  melatonin 5 milliGRAM(s) Oral at bedtime  methocarbamol 500 milliGRAM(s) Oral every 8 hours  metoprolol tartrate 25 milliGRAM(s) Oral two times a day  mirtazapine 7.5 milliGRAM(s) Oral at bedtime  Nephro-elicia 1 Tablet(s) Oral daily  pantoprazole  Injectable 40 milliGRAM(s) IV Push daily  sodium chloride 0.65% Nasal 1 Spray(s) Both Nostrils every 12 hours  sodium chloride 0.9% for Nebulization 3 milliLiter(s) Nebulizer every 6 hours  sodium chloride 0.9%. 1000 milliLiter(s) IV Continuous <Continuous>  sodium zirconium cyclosilicate 10 Gram(s) Oral <User Schedule>  traZODone 100 milliGRAM(s) Oral at bedtime    Home Medications:  aspirin 81 mg oral delayed release tablet: 1 tab(s) orally once a day (23 Dec 2024 16:58)  calcium acetate 667 mg oral tablet: 1 tab(s) orally 3 times a day (23 Dec 2024 16:58)  carvedilol 12.5 mg oral tablet: 1 tab(s) orally every 12 hours (23 Dec 2024 16:56)  clopidogrel 75 mg oral tablet: 1 tab(s) orally once a day (23 Dec 2024 16:56)  furosemide 20 mg oral tablet: 1 tab(s) orally once a day (23 Dec 2024 16:58)  hydrALAZINE 25 mg oral tablet: 1 tab(s) orally 3 times a day (23 Dec 2024 16:58)  lisinopril 40 mg oral tablet: 1 tab(s) orally once a day (23 Dec 2024 16:58)  lovastatin 10 mg oral tablet: 1 tab(s) orally once a day (23 Dec 2024 16:56)  NIFEdipine 90 mg oral tablet, extended release: 1 tab(s) orally once a day (23 Dec 2024 16:58)  Emani-Elicia oral tablet: 1 tab(s) orally once a day (23 Dec 2024 16:58)  traZODone 100 mg oral tablet: 1 tab(s) orally once a day (at bedtime) (23 Dec 2024 16:56)    PHYSICAL EXAM:  Gen : no acute distress  Neck: + trach   Respiratory: decreased in the bases  Cardiovascular: AFlutter  Gastrointestinal: distended, soft   Extremities: No peripheral edema  Vascular: 2+ peripheral pulses    S/p CABG  Respiratory failure due to recurrent left lung plugging now s/p trach  ESRD on HD  Post op AFib   History of RV dysfunction   h/o Recurrent ascites   Post operative pain   Sacral decub   Recurrent ascites likely from Chronic RV failure - s/p paracentesis   Left Diaphragm dysfunction     s/p CABG in setting of biV  dysfunction.  Recovered from surgery current active issue has been recurrent left lung mucus plugging.  Pt also has left diaphragm dysfunction  s/p trach for secretion management.    Continue current care  Aggressive pulm toileting, chest percussion, suction as needed  Repeat CXR yesterday PM, trach downsized to 7  Continue Trach collar  HD per renal  History of Ascities - abdominal exam stable, no fluid shift or increased girth, no acute indication for paracenthesis  PT, walking.         Critical care time spent    min       Care plan discussed with the ICU care team.   Patient remains critical, at risk for life threatening decompensation.    I have spent 30 minutes providing critical care management to this patient.    By signing my name below, I, Baldemar Hernandez, attest that this documentation has been prepared under the direction and in the presence of Herminio Mendez MD.   Electronically signed: Baldemar Hernandez, 25 @ 06:58    I, Herminio Mendez, personally performed the services described in this documentation. All medical record entries made by the scribe were at my direction and in my presence. I have reviewed the chart and agree that the record reflects my personal performance and is accurate and complete  Electronically signed: Herminio Mendez MD.  Brief History:  55 yo M with multiple medical problems including CAD s/p PCI in 2024 s/p CABG x 3 on 24    1: Intubated for hypoxia & left lung white out s/p awake bronch with removal of copious mucopurulent secretions  : s/p IABP insertion, sepsis/septic shock due to E coli   ESBL pneumonia, collapsed Left Lung, s/p multiple bronch    tracheostomy tube placement    VRE E. Faecium Bcx   +HSV PCR BAL   tissue cx from back abscess - Serratia/MRSA  - - intermittent bradycardia/junctional episodes with hypotension  2/3: off , off theophylline. iHD 1L UF  : bronch for Left lung collapse wound vac, 2decho w/ moderate pericardial effusion - similar as before, US abd: small - moderate ascites - monitor at this time.  Wound vac placed for sacral wounds  : iHD-1L UF  : bronch today off positive pressure   : concern for left food drop   2/10 : no acute issues - CXR shows improving left sided aeration, pt subjectively reports having difficulty while lying flat  : s/p Paracentesis 3.5 L removed, leukocytosis   : Ascites fluid PMN < 250 (less likely SBP)   sniff test yesterday showed paradoxical diaphragmatic motion    : mucus plugging but able to clear airway with aggressive pulmonary toileting.   : no vent requirement overnight, able to mobilize secretion with pulm toileting  hyperkalemia post HD - workup for possible recirculation underway   : On TC X 48 hours - ambulating well, no mucus plugging events  : HFTC x24hrs (40L 30%)  : iHD 1.5L UF  : iHD 2L  : trach downsized to 7, bronch today    ICU Vital Signs Last 24 Hrs  T(C): 36.6 (25 @ 04:00), Max: 36.7 (25 @ 12:00)  T(F): 97.8 (25 @ 04:00), Max: 98 (25 @ 12:00)  HR: 73 (25 @ 05:29) (62 - 100)  BP: 128/59 (25 @ 05:00) (111/45 - 188/77)  BP(mean): 85 (25 @ 05:00) (65 - 110)  RR: 12 (25 @ 05:00) (12 - 28)  SpO2: 100% (25 @ 05:29) (95% - 100%)    I&O's Summary    2025 07:  -  2025 07:00  --------------------------------------------------------  IN: 2662 mL / OUT: 2600 mL / NET: 62 mL    2025 07:01  -  2025 06:57  --------------------------------------------------------  IN: 330.5 mL / OUT: 0 mL / NET: 330.5 mL                       7.9    8.59  )-----------( 263      ( 2025 00:29 )             24.8     2025 00:29    136    |  95     |  35     ----------------------------<  104    5.4     |  29     |  4.51     Ca    9.1        2025 00:29  Phos  4.5       2025 00:29  Mg     2.4       2025 00:29    TPro  6.6    /  Alb  2.8    /  TBili  0.4    /  DBili  x      /  AST  17     /  ALT  9      /  AlkPhos  91     2025 00:29    PT/INR - ( 2025 00:34 )   PT: 13.7 sec;   INR: 1.19 ratio    PTT - ( 2025 00:34 )  PTT:32.6 sec    MEDICATIONS  (STANDING):  aMIOdarone    Tablet 200 milliGRAM(s) Oral daily  ascorbic acid 500 milliGRAM(s) Oral daily  aspirin  chewable 81 milliGRAM(s) Oral daily  atorvastatin 80 milliGRAM(s) Oral at bedtime  epoetin ignacia (EPOGEN) Injectable 82616 Unit(s) IV Push <User Schedule>  ferrous    sulfate Liquid 300 milliGRAM(s) Enteral Tube daily  heparin  Infusion 1300 Unit(s)/Hr IV Continuous <Continuous>  insulin lispro (ADMELOG) corrective regimen sliding scale   SubCutaneous every 6 hours  melatonin 5 milliGRAM(s) Oral at bedtime  methocarbamol 500 milliGRAM(s) Oral every 8 hours  metoprolol tartrate 25 milliGRAM(s) Oral two times a day  mirtazapine 7.5 milliGRAM(s) Oral at bedtime  Nephro-elicia 1 Tablet(s) Oral daily  pantoprazole  Injectable 40 milliGRAM(s) IV Push daily  sodium zirconium cyclosilicate 10 Gram(s) Oral <User Schedule>  traZODone 100 milliGRAM(s) Oral at bedtime      PHYSICAL EXAM:  Gen : no acute distress  Neck: + trach   Respiratory: decreased in the bases  Cardiovascular: AFlutter  Gastrointestinal: distended, soft   Extremities: No peripheral edema  Vascular: 2+ peripheral pulses    S/p CABG  Respiratory failure due to recurrent left lung plugging now s/p trach  ESRD on HD  Post op AFib   History of RV dysfunction   h/o Recurrent ascites   Post operative pain   Sacral decub   Recurrent ascites likely from Chronic RV failure - s/p paracentesis   Left Diaphragm dysfunction     s/p CABG in setting of biV  dysfunction.  Recovered from surgery current active issue has been recurrent left lung mucus plugging.  Pt also has left diaphragm dysfunction  s/p trach for secretion management.    Continue current care  Aggressive pulm toileting, chest percussion, suction as needed  Repeat CXR yesterday PM, trach downsized to 7  Continue Trach collar  HD per renal  History of Ascities - abdominal exam stable, no fluid shift or increased girth, no acute indication for paracenthesis  PT, walking.         Critical care time spent    min       Care plan discussed with the ICU care team.   Patient remains critical, at risk for life threatening decompensation.    I have spent 30 minutes providing critical care management to this patient.    By signing my name below, I, Baldemar Hernandez, attest that this documentation has been prepared under the direction and in the presence of Herminio Mendez MD.   Electronically signed: Baldemar Hernandez, 25 @ 06:58    I, Herminio Mendez, personally performed the services described in this documentation. All medical record entries made by the scribe were at my direction and in my presence. I have reviewed the chart and agree that the record reflects my personal performance and is accurate and complete  Electronically signed: Herminio Mendez MD.  Brief History:  55 yo M with multiple medical problems including CAD s/p PCI in 2024 s/p CABG x 3 on 24    1: Intubated for hypoxia & left lung white out s/p awake bronch with removal of copious mucopurulent secretions  : s/p IABP insertion, sepsis/septic shock due to E coli   ESBL pneumonia, collapsed Left Lung, s/p multiple bronch    tracheostomy tube placement    VRE E. Faecium Bcx   +HSV PCR BAL   tissue cx from back abscess - Serratia/MRSA  - - intermittent bradycardia/junctional episodes with hypotension  2/3: off , off theophylline. iHD 1L UF  : bronch for Left lung collapse wound vac, 2decho w/ moderate pericardial effusion - similar as before, US abd: small - moderate ascites - monitor at this time.  Wound vac placed for sacral wounds  : iHD-1L UF  : bronch today off positive pressure   : concern for left food drop   2/10 : no acute issues - CXR shows improving left sided aeration, pt subjectively reports having difficulty while lying flat  : s/p Paracentesis 3.5 L removed, leukocytosis   : Ascites fluid PMN < 250 (less likely SBP)   sniff test yesterday showed paradoxical diaphragmatic motion    : mucus plugging but able to clear airway with aggressive pulmonary toileting.   : no vent requirement overnight, able to mobilize secretion with pulm toileting  hyperkalemia post HD - workup for possible recirculation underway   : On TC X 48 hours - ambulating well, no mucus plugging events  : HFTC x24hrs (40L 30%)  : iHD 1.5L UF  : iHD 2L  : trach downsized to 7, bronch today    ICU Vital Signs Last 24 Hrs  T(C): 36.6 (25 @ 04:00), Max: 36.7 (25 @ 12:00)  T(F): 97.8 (25 @ 04:00), Max: 98 (25 @ 12:00)  HR: 73 (25 @ 05:29) (62 - 100)  BP: 128/59 (25 @ 05:00) (111/45 - 188/77)  BP(mean): 85 (25 @ 05:00) (65 - 110)  RR: 12 (25 @ 05:00) (12 - 28)  SpO2: 100% (25 @ 05:29) (95% - 100%)    I&O's Summary    2025 07:  -  2025 07:00  --------------------------------------------------------  IN: 2662 mL / OUT: 2600 mL / NET: 62 mL    2025 07:01  -  2025 06:57  --------------------------------------------------------  IN: 330.5 mL / OUT: 0 mL / NET: 330.5 mL                       7.9    8.59  )-----------( 263      ( 2025 00:29 )             24.8     2025 00:29    136    |  95     |  35     ----------------------------<  104    5.4     |  29     |  4.51     Ca    9.1        2025 00:29  Phos  4.5       2025 00:29  Mg     2.4       2025 00:29    TPro  6.6    /  Alb  2.8    /  TBili  0.4    /  DBili  x      /  AST  17     /  ALT  9      /  AlkPhos  91     2025 00:29    PT/INR - ( 2025 00:34 )   PT: 13.7 sec;   INR: 1.19 ratio    PTT - ( 2025 00:34 )  PTT:32.6 sec    MEDICATIONS  (STANDING):  aMIOdarone    Tablet 200 milliGRAM(s) Oral daily  ascorbic acid 500 milliGRAM(s) Oral daily  aspirin  chewable 81 milliGRAM(s) Oral daily  atorvastatin 80 milliGRAM(s) Oral at bedtime  epoetin ignacia (EPOGEN) Injectable 26903 Unit(s) IV Push <User Schedule>  ferrous    sulfate Liquid 300 milliGRAM(s) Enteral Tube daily  heparin  Infusion 1300 Unit(s)/Hr IV Continuous <Continuous>  insulin lispro (ADMELOG) corrective regimen sliding scale   SubCutaneous every 6 hours  melatonin 5 milliGRAM(s) Oral at bedtime  methocarbamol 500 milliGRAM(s) Oral every 8 hours  metoprolol tartrate 25 milliGRAM(s) Oral two times a day  mirtazapine 7.5 milliGRAM(s) Oral at bedtime  Nephro-elicia 1 Tablet(s) Oral daily  pantoprazole  Injectable 40 milliGRAM(s) IV Push daily  sodium zirconium cyclosilicate 10 Gram(s) Oral <User Schedule>  traZODone 100 milliGRAM(s) Oral at bedtime      PHYSICAL EXAM:  Gen : no acute distress  Neck: + trach   Respiratory: decreased in the bases  Cardiovascular: AFlutter  Gastrointestinal: distended, soft   Extremities: No peripheral edema  Vascular: 2+ peripheral pulses    S/p CABG  Respiratory failure due to recurrent left lung plugging now s/p trach  ESRD on HD  Post op AFib   History of RV dysfunction   h/o Recurrent ascites   Post operative pain   Sacral decub   Recurrent ascites likely from Chronic RV failure - s/p paracentesis   Left Diaphragm dysfunction     s/p CABG in setting of biV  dysfunction.  Recovered from surgery current active issue has been recurrent left lung mucus plugging.  Pt also has left diaphragm dysfunction  s/p trach for secretion management.    Continue current care  Aggressive pulm toileting, chest percussion, suction as needed  Repeat CXR  shows improved aeration, trach downsized to 7  Continue Trach collar  HD per renal  History of Ascites - abdominal exam stable, no fluid shift or increased girth, no acute indication for paracenthesis  PT, walking.     Critical care time spent  51  min       Care plan discussed with the ICU care team.   Patient remains critical, at risk for life threatening decompensation.    I have spent 30 minutes providing critical care management to this patient.    By signing my name below, I, Baldemar Hernandez, attest that this documentation has been prepared under the direction and in the presence of Herminio Mendez MD.   Electronically signed: Baldemar Hernandez, 25 @ 06:58    I, Herminio Mendez, personally performed the services described in this documentation. All medical record entries made by the scribe were at my direction and in my presence. I have reviewed the chart and agree that the record reflects my personal performance and is accurate and complete  Electronically signed: Herminio Mendez MD.

## 2025-02-28 NOTE — PROGRESS NOTE ADULT - ASSESSMENT
54M with PMH of HTN, HLD, ESRD on HD MWF via LUE AVF, ascites (requiring repeat paracenteses q4-6wks), NICM with moderate LV dysfunction w/ EF 35-40%() and CAD s/p atherectomy + SALLIE to D1(2024) presents to [facility] transferred from [facility].  Cardiac cath which showed pLAD 70% ISR, mLCx 70%, D1 severe dz.   Patient now transferred to [facility] CTS Dr. Rivers for CABG eval      # CAD s/p NSTEMI  Hx CAD s/p PCI LAD   Presented with ADHF elevated troponins  Positive NST1-Cath- pLAD 70% ISR, mLCx 70%, D1 severe dz.   TTE EF 35% Severe TRWas on Plavix-[id number] been off Plavix since -POD#1-C3L free LIMA-LAD; SVG-Diag; LPH-cofrUF-Zmizwtnaa on  Sinus rhythm,Amio for AF prophylaxis   Atrial Flutter on TC during day Ambulating  -Increased congestion on CXR had HD yesterday remains in AFl   -Atrial Flutter HFTC 40L/30% BP stable     -Atrial Flutter tolerating TC for HD MWF-consider DOAC   OOB Abd US moderate 4 quad ascites noted remains in Atrial Flutter~~70's   TC Atrial Flutter    -s/p Bronch/BAL remains in AFl  Trach downsized 7       # Acute on Chronic Systolic Heart Failure now improved  EF-35% ,severe TR  Had HD post op  GDMT post op  LVEF improved post bypass   -TTE EF 40%,trace effusion  ECG c/w pericarditis-was on colchicine   01/15-TTE EF 55%  -TTE EF 60%   -Normal LV systolic function Moderate pericardial effusion  -On TC-HF and Vent support at nights   02/10-Vent HS with T Collar trial Ambulated Remains in AF  Repeat TTE Normal LV Systolic function Small Pericardial effusion decreased from 2/3        Vascular evaluated  AVGraft /?steal causing RV failure-'' Very low suspicion of steal Sd and AVF causing current clinical state. ''   VA Duplex Hemodialysis Access, Left (25 @ 13:35) >   Arterial flow volume of 1301 mL/m, and the venous flow volume of  1492 mL/m are consistent with a normally functioning dialysis access  fistula.      # Ascites  Hx chronic ascites  Has drainage q4-6 weeks  Last drained -4600mL  ? ascites related to severe TR  Had [id number]-Tndfekw-ib growth   CT Abdomen 01/10-Large volume ascites-  US abdomen--Small to moderate volume abdominal/pelvic ascites  US abdomen  Moderate ascites  US Abdomen -Moderate 4 quad ascites    # ESRD  Now on  HD-MWF

## 2025-02-28 NOTE — CHART NOTE - NSCHARTNOTEFT_GEN_A_CORE
NUTRITION FOLLOW UP NOTE    PATIENT SEEN FOR: ICU Enteral nutrition F/U    SOURCE: [X] Patient  [x] Current Medical Record  [X] RN  [] Family/support person at bedside  [] Patient unavailable/inappropriate  [] Other:    CHART REVIEWED/EVENTS NOTED.  [] No changes to nutrition care plan to note  [X] Nutrition Status:  -s/p CABG x 3 on 24  -25 tracheostomy placement. tolerating TC  -s/p sacral debridement on 25. wound vac placed on 25  -ESRD - tolerating iHD, on HD PTA.  -  reported nausea, ordered abdominal ultrasound  -  s/p bronchoscopy and trach change     DIET ORDER:   Diet, NPO with Tube Feed:   Tube Feeding Modality: Nasogastric  Vital 1.5 Jeremias (VITAL1.5RTH)  Continuous  Starting Tube Feed Rate {mL per Hour}: 10  Increase Tube Feed Rate by (mL): 10     Every hour  Until Goal Tube Feed Rate (mL per Hour): 70  Tube Feed Duration (in Hours): 24  Tube Feed Start Time: 18:30    Start Time: 23:00 (25)      CURRENT DIET ORDER IS:  [] Appropriate:  [] Inadequate:  [X] Other: See below for recommendations.     NUTRITION INTAKE/PROVISION:  [] PO:  [X] Enteral Nutrition:     ENTERAL NUTRITION  EN Order Provides:  1680 ml formula, 2520 kcal, 114 g protein, 1283 ml free water; meets 32 kcal/kg and 1.4 g protein/kg, based on IBW of 172lbs/ 78 kg     Current Pump Rate: 0 ml/hour- per I&Os flowsheet- "hold feeds until AM s/p bronch"   5-Day Average (-) Enteral Provision per RN flowsheet: 1192 mL, 1788 kcals, 81 g protein   EN provision: 70 % EN goal rate provided in past 5 days   - Patient reports nausea/vomiting due to tube feeding.   -  per I&Os nursing flowsheet, "pt refused gradual increase tube feeds per order". "pt c/o nausea, tube feeds held"   -  per I&Os nursing flowsheet "pt vomited. NPO"   - Feeds gradually titrated up over the past 5 days per I&Os nursing flowsheets   [] Parenteral Nutrition:    ANTHROPOMETRICS:  Drug Dosing Weight  Height (cm): 180.3 (30 Dec 2024 12:01)  Weight (kg): 85.1 (30 Dec 2024 12:01)  BMI (kg/m2): 26.2 (30 Dec 2024 12:01)  Weights:   Daily Weight in k (), 71.8 (), 85.3 (),  87.3 (), 73.3 (), 86.9 (), 81 () 81.6 (), 81.8 (), 72 (), 72.8 (), 78.3 (), 83 (), 73 (), 80.6 (), 80.6 (02-15), 84.4 (), 78 (), 74.7 (), 85.7 (), 85 (02-10), 88 (02-10), 85.9 (), 80.8 (), 87 (), 86 (), 91.2 (), 97.1 (), 78.7 (), 88 (), 83.4 (), 77.7 (), 81 (), 88.7 (), 83.3 (), 97.6 (01-10), 79.6 (), 93.4 (), 85 (), 90 (), 84 (), 77.5 (), 87.8 (), 88.3 (, standing), 85.3 (, standing), 85 (, standing), 79.7 (), 80.9 (, standing), 81.3 (, standing), 82.6 ().  Weight fluctuations likely in setting of fluid shifts. RD to continue to monitor weight trends and fluctuations.       NUTRITIONALLY PERTINENT MEDICATIONS:  MEDICATIONS  (STANDING):  aMIOdarone    Tablet 200 milliGRAM(s) Oral daily  ascorbic acid 500 milliGRAM(s) Oral daily  aspirin  chewable 81 milliGRAM(s) Oral daily  atorvastatin 80 milliGRAM(s) Oral at bedtime  chlorhexidine 2% Cloths 1 Application(s) Topical daily  chlorhexidine 4% Liquid 1 Application(s) Topical <User Schedule>  dextrose 50% Injectable 50 milliLiter(s) IV Push every 15 minutes  epoetin ignacia (EPOGEN) Injectable 30775 Unit(s) IV Push <User Schedule>  ferrous    sulfate Liquid 300 milliGRAM(s) Enteral Tube daily  heparin  Infusion 1300 Unit(s)/Hr (13.5 mL/Hr) IV Continuous <Continuous>  insulin lispro (ADMELOG) corrective regimen sliding scale   SubCutaneous every 6 hours  melatonin 5 milliGRAM(s) Oral at bedtime  methocarbamol 500 milliGRAM(s) Oral every 8 hours  metoprolol tartrate 25 milliGRAM(s) Oral two times a day  mirtazapine 7.5 milliGRAM(s) Oral at bedtime  Nephro-elicia 1 Tablet(s) Oral daily  oxyCODONE    IR 10 milliGRAM(s) Oral once  pantoprazole  Injectable 40 milliGRAM(s) IV Push daily  sodium chloride 0.65% Nasal 1 Spray(s) Both Nostrils every 12 hours  sodium chloride 0.9% for Nebulization 3 milliLiter(s) Nebulizer every 6 hours  sodium chloride 0.9%. 1000 milliLiter(s) (10 mL/Hr) IV Continuous <Continuous>  sodium zirconium cyclosilicate 10 Gram(s) Oral <User Schedule>  traZODone 100 milliGRAM(s) Oral at bedtime    MEDICATIONS  (PRN):  acetaminophen     Tablet .. 650 milliGRAM(s) Oral every 6 hours PRN Mild Pain (1 - 3)  lidocaine/prilocaine Cream 1 Application(s) Topical daily PRN pre-HD  sodium chloride 0.9% lock flush 10 milliLiter(s) IV Push every 1 hour PRN Pre/post blood products, medications, blood draw, and to maintain line patency      NUTRITIONALLY PERTINENT LABS:   Na136 mmol/L Glu 104 mg/dL[H] K+ 5.4 mmol/L[H] Cr  4.51 mg/dL[H] BUN 35 mg/dL[H]  Phos 4.5 mg/dL  Alb 2.8 g/dL[L] ALT 9 U/L[L] AST 17 U/L Alkaline Phosphatase 91 U/L    A1C with Estimated Average Glucose Result: 5.6 % (24 @ 06:50)    Finger Sticks:  POCT Blood Glucose.: 101 mg/dL ( @ 05:31)  POCT Blood Glucose.: 115 mg/dL ( @ 00:50)  POCT Blood Glucose.: 111 mg/dL ( @ 18:21)  POCT Blood Glucose.: 194 mg/dL ( @ 12:00)      NUTRITIONALLY PERTINENT MEDICATIONS/LABS:  [x] Reviewed  [X] Relevant notes on medications/labs:    EDEMA:  [x] Reviewed  [X] Relevant notes: No edema per nursing flowsheets.     GI/ I&O:  [x] Reviewed  [X] Relevant notes: Patient reports current nausea. Reports episode of nausea/vomiting last night () and vomiting 5 days ago () patient feels the cause of the nausea/vomiting episodes is related to the tube feeding. Pt made aware the importance of the tube feeding and protocol for diet advancement.   [] Other:    SKIN:   [] No pressure injuries documented, per nursing flowsheet  [] Pressure injury previously noted  [X] Change in pressure injury documentation: Stage IV sacrum pressure injury per nursing flowsheets.   [X] Other:  -Per Wound Care : "Sacral/buttocks wound 2/2 skin failure, Rt upper back injury now resolved"  -midsternal surgical incision from CABG     ESTIMATED NEEDS:  [x] No change:  [] Updated:  Energy:  8684-3089 kcal/day (30-35 kcal/kg)  Protein:  109-140 g/day (1.4-1.8 g/kg)  Fluid:   ml/day or [x] defer to team  Based on: IBW 172lb/78kg    NUTRITION DIAGNOSIS:  [X] Prior Dx: Inadequate energy intake, Increased Nutrient Needs (protein-energy, micronutrients)  [] New Dx:    EDUCATION:  [] Yes:  [X] Not appropriate/warranted    NUTRITION CARE PLAN:  1. Diet: In setting of nausea/vomiting begin Vital 1.5 at 10 ml/hour increasing slowly at 10 ml/hour every 12 hours if patient is tolerating. Once nausea/vomiting resolved resume goal regimen of Vital 1.5 @ 70ml/hr x 24hr. Provides 1680 ml formula, 2520kcal, 114 grams of protein, 1284ml free water; meets 32 kcal/kg and 1.4 g/kg protein based on IBW 172lb/78kg.  - Defer diet advancement to team.   - Defer free water flushes to team.   2. Continue Nephro-elicia, defer vitamin C continuation to team.     MONITORING AND EVALUATION:   RD remains available upon request and will follow up per protocol.    Mckenzie Dudley MS, Dietetic Intern ([employer])   Corinne Lima RDN CDN ([employer]) NUTRITION FOLLOW UP NOTE    PATIENT SEEN FOR: ICU Enteral nutrition follow up     SOURCE: [X] Patient  [x] Current Medical Record  [] RN  [] Family/support person at bedside  [] Patient unavailable/inappropriate  [] Other:    CHART REVIEWED/EVENTS NOTED.  [] No changes to nutrition care plan to note  [X] Nutrition Status:  -s/p CABG x 3 on 24  -25 tracheostomy placement. tolerating TC  -s/p sacral debridement on 25. wound vac placed on 25  -ESRD - tolerating iHD, on HD PTA.  -  s/p bronchoscopy and trach change     DIET ORDER:   Diet, NPO with Tube Feed:   Tube Feeding Modality: Nasogastric  Vital 1.5 Jeremias (VITAL1.5RTH)  Continuous  Starting Tube Feed Rate {mL per Hour}: 10  Increase Tube Feed Rate by (mL): 10     Every hour  Until Goal Tube Feed Rate (mL per Hour): 70  Tube Feed Duration (in Hours): 24  Tube Feed Start Time: 18:30    Start Time: 23:00 (25)      CURRENT DIET ORDER IS:  [X] Appropriate:  [] Inadequate:  [] Other:     NUTRITION INTAKE/PROVISION:  [] PO:  [X] Enteral Nutrition:     ENTERAL NUTRITION  EN Order Provides:  1680 ml formula, 2520 kcal, 114 g protein, 1283 ml free water; meets 32 kcal/kg and 1.4 g protein/kg, based on IBW of 172lbs/ 78 kg     Current Pump Rate: feeds being held s/p bronch  5-Day Average Enteral Provision per RN flowsheet: 1066 mL, 1599 kcals, 72 g protein- meets 68% of lower calorie needs.   7-Day Average Enteral Provision per RN flowsheet: 1241 mL, 1862 kcals, 84 g protein  - meets 80% of lower calorie needs.   - Noted periods of feeds being held and nausea during this time frame.   [] Parenteral Nutrition:    ANTHROPOMETRICS:  Drug Dosing Weight  Height (cm): 180.3 (30 Dec 2024 12:01)  Weight (kg): 85.1 (30 Dec 2024 12:01)  BMI (kg/m2): 26.2 (30 Dec 2024 12:)  Weights:   Daily Weight in k (), 71.8 (), 85.3 (),  87.3 (), 73.3 (), 86.9 (), 81 () 81.6 (), 81.8 (), 72 (), 72.8 (), 78.3 (), 83 (), 73 (), 80.6 (), 80.6 (02-15), 84.4 (), 78 (), 74.7 (), 85.7 (), 85 (02-10), 88 (02-10), 85.9 (), 80.8 (), 87 (), 86 (), 91.2 (), 97.1 (), 78.7 (), 88 (), 83.4 (), 77.7 (), 81 (), 88.7 (), 83.3 (), 97.6 (01-10), 79.6 (), 93.4 (), 85 (), 90 (), 84 (), 77.5 (), 87.8 (), 88.3 (, standing), 85.3 (, standing), 85 (, standing), 79.7 (), 80.9 (, standing), 81.3 (, standing), 82.6 ().  Weight fluctuations likely in setting of fluid shifts. RD to continue to monitor weight trends and fluctuations.       NUTRITIONALLY PERTINENT MEDICATIONS:  MEDICATIONS  (STANDING):  aMIOdarone    Tablet 200 milliGRAM(s) Oral daily  ascorbic acid 500 milliGRAM(s) Oral daily  aspirin  chewable 81 milliGRAM(s) Oral daily  atorvastatin 80 milliGRAM(s) Oral at bedtime  chlorhexidine 2% Cloths 1 Application(s) Topical daily  chlorhexidine 4% Liquid 1 Application(s) Topical <User Schedule>  dextrose 50% Injectable 50 milliLiter(s) IV Push every 15 minutes  epoetin ignacia (EPOGEN) Injectable 62309 Unit(s) IV Push <User Schedule>  ferrous    sulfate Liquid 300 milliGRAM(s) Enteral Tube daily  heparin  Infusion 1300 Unit(s)/Hr (13.5 mL/Hr) IV Continuous <Continuous>  insulin lispro (ADMELOG) corrective regimen sliding scale   SubCutaneous every 6 hours  melatonin 5 milliGRAM(s) Oral at bedtime  methocarbamol 500 milliGRAM(s) Oral every 8 hours  metoprolol tartrate 25 milliGRAM(s) Oral two times a day  mirtazapine 7.5 milliGRAM(s) Oral at bedtime  Nephro-elicia 1 Tablet(s) Oral daily  oxyCODONE    IR 10 milliGRAM(s) Oral once  pantoprazole  Injectable 40 milliGRAM(s) IV Push daily  sodium chloride 0.65% Nasal 1 Spray(s) Both Nostrils every 12 hours  sodium chloride 0.9% for Nebulization 3 milliLiter(s) Nebulizer every 6 hours  sodium chloride 0.9%. 1000 milliLiter(s) (10 mL/Hr) IV Continuous <Continuous>  sodium zirconium cyclosilicate 10 Gram(s) Oral <User Schedule>  traZODone 100 milliGRAM(s) Oral at bedtime    MEDICATIONS  (PRN):  acetaminophen     Tablet .. 650 milliGRAM(s) Oral every 6 hours PRN Mild Pain (1 - 3)  lidocaine/prilocaine Cream 1 Application(s) Topical daily PRN pre-HD  sodium chloride 0.9% lock flush 10 milliLiter(s) IV Push every 1 hour PRN Pre/post blood products, medications, blood draw, and to maintain line patency      NUTRITIONALLY PERTINENT LABS:   Na136 mmol/L Glu 104 mg/dL[H] K+ 5.4 mmol/L[H] Cr  4.51 mg/dL[H] BUN 35 mg/dL[H]  Phos 4.5 mg/dL  Alb 2.8 g/dL[L] ALT 9 U/L[L] AST 17 U/L Alkaline Phosphatase 91 U/L    A1C with Estimated Average Glucose Result: 5.6 % (24 @ 06:50)    Finger Sticks:  POCT Blood Glucose.: 101 mg/dL ( @ 05:31)  POCT Blood Glucose.: 115 mg/dL ( @ 00:50)  POCT Blood Glucose.: 111 mg/dL ( @ 18:21)  POCT Blood Glucose.: 194 mg/dL ( @ 12:00)      NUTRITIONALLY PERTINENT MEDICATIONS/LABS:  [x] Reviewed  [X] Relevant notes on medications/labs:  - insulin for glycemic control  -remeron which can stimulate appetite  -lokelma 3x/ week on non-HD days  - Noted potassium high on ,  and   - Noted phosphorus WNL     EDEMA:  [x] Reviewed  [X] Relevant notes: No edema per nursing flowsheets.     GI/ I&O:  [x] Reviewed  [X] Relevant notes: Patient reports current nausea. Reports episode of nausea/vomiting last night () and vomiting 5 days ago () patient feels the cause of the nausea/vomiting episodes is related to the tube feeding. Pt made aware the importance of the tube feeding and protocol for diet advancement.   [] Other:    SKIN:   [] No pressure injuries documented, per nursing flowsheet  [] Pressure injury previously noted  [X] Change in pressure injury documentation: Stage IV sacrum pressure injury per nursing flowsheets.   [X] Other:  -Per Wound Care : "Sacral/buttocks wound 2/2 skin failure, Rt upper back injury now resolved"  -midsternal surgical incision from CABG     ESTIMATED NEEDS:  [x] No change:  [] Updated:  Energy:  7599-9864 kcal/day (30-35 kcal/kg)  Protein:  109-140 g/day (1.4-1.8 g/kg)  Fluid:   ml/day or [x] defer to team  Based on: IBW 172lb/78kg    NUTRITION DIAGNOSIS:  [X] Prior Dx: Inadequate energy intake, Increased Nutrient Needs (protein-energy, micronutrients)  [] New Dx:    EDUCATION:  [] Yes:  [X] Not appropriate/warranted    NUTRITION CARE PLAN:  1. Diet: In setting of nausea/vomiting begin Vital 1.5 at 10 ml/hour increasing slowly at 10 ml/hour every 12 hours if patient is tolerating. Once nausea/vomiting resolved resume goal regimen of Vital 1.5 @ 70ml/hr x 24hr. Provides 1680 ml formula, 2520kcal, 114 grams of protein, 1284ml free water; meets 32 kcal/kg and 1.4 g/kg protein based on IBW 172lb/78kg.  - Defer diet advancement to team.   - Defer free water flushes to team.   2. Continue Nephro-elicia, defer vitamin C continuation to team.     MONITORING AND EVALUATION:   RD remains available upon request and will follow up per protocol.    Mckenzie Dudley MS, Dietetic Intern ([employer])   Corinne Lima RDN CDN ([employer]) NUTRITION FOLLOW UP NOTE    PATIENT SEEN FOR: ICU Enteral nutrition follow up     SOURCE: [X] Patient  [x] Current Medical Record  [] RN  [] Family/support person at bedside  [] Patient unavailable/inappropriate  [] Other:    CHART REVIEWED/EVENTS NOTED.  [] No changes to nutrition care plan to note  [X] Nutrition Status:  -s/p CABG x 3 on 24  -25 tracheostomy placement. tolerating TC  -s/p sacral debridement on 25. wound vac placed on 25  -ESRD - tolerating iHD, on HD PTA.  -  s/p bronchoscopy and trach change     DIET ORDER:   Diet, NPO with Tube Feed:   Tube Feeding Modality: Nasogastric  Vital 1.5 Jeremias (VITAL1.5RTH)  Continuous  Starting Tube Feed Rate {mL per Hour}: 10  Increase Tube Feed Rate by (mL): 10     Every hour  Until Goal Tube Feed Rate (mL per Hour): 70  Tube Feed Duration (in Hours): 24  Tube Feed Start Time: 18:30    Start Time: 23:00 (25)      CURRENT DIET ORDER IS:  [X] Appropriate:  [] Inadequate:  [] Other:     NUTRITION INTAKE/PROVISION:  [] PO:  [X] Enteral Nutrition:     ENTERAL NUTRITION  EN Order Provides:  1680 ml formula, 2520 kcal, 114 g protein, 1283 ml free water; meets 32 kcal/kg and 1.4 g protein/kg, based on IBW of 172lbs/ 78 kg     Current Pump Rate: feeds being held s/p bronch  5-Day Average Enteral Provision per RN flowsheet: 1066 mL, 1599 kcals, 72 g protein- meets 68% of lower calorie needs.   7-Day Average Enteral Provision per RN flowsheet: 1241 mL, 1862 kcals, 84 g protein  - meets 80% of lower calorie needs.   - Noted periods of feeds being held and nausea during this time frame.   [] Parenteral Nutrition:    ANTHROPOMETRICS:  Drug Dosing Weight  Height (cm): 180.3 (30 Dec 2024 12:01)  Weight (kg): 85.1 (30 Dec 2024 12:01)  BMI (kg/m2): 26.2 (30 Dec 2024 12:)  Weights:   Daily Weight in k (), 71.8 (), 85.3 (),  87.3 (), 73.3 (), 86.9 (), 81 () 81.6 (), 81.8 (), 72 (), 72.8 (), 78.3 (), 83 (), 73 (), 80.6 (), 80.6 (02-15), 84.4 (), 78 (), 74.7 (), 85.7 (), 85 (02-10), 88 (02-10), 85.9 (), 80.8 (), 87 (), 86 (), 91.2 (), 97.1 (), 78.7 (), 88 (), 83.4 (), 77.7 (), 81 (), 88.7 (), 83.3 (), 97.6 (01-10), 79.6 (), 93.4 (), 85 (), 90 (), 84 (), 77.5 (), 87.8 (), 88.3 (, standing), 85.3 (, standing), 85 (, standing), 79.7 (), 80.9 (, standing), 81.3 (, standing), 82.6 ().  Weight fluctuations likely in setting of fluid shifts. RD to continue to monitor weight trends and fluctuations.       NUTRITIONALLY PERTINENT MEDICATIONS:  MEDICATIONS  (STANDING):  aMIOdarone    Tablet 200 milliGRAM(s) Oral daily  ascorbic acid 500 milliGRAM(s) Oral daily  aspirin  chewable 81 milliGRAM(s) Oral daily  atorvastatin 80 milliGRAM(s) Oral at bedtime  chlorhexidine 2% Cloths 1 Application(s) Topical daily  chlorhexidine 4% Liquid 1 Application(s) Topical <User Schedule>  dextrose 50% Injectable 50 milliLiter(s) IV Push every 15 minutes  epoetin ignacia (EPOGEN) Injectable 12148 Unit(s) IV Push <User Schedule>  ferrous    sulfate Liquid 300 milliGRAM(s) Enteral Tube daily  heparin  Infusion 1300 Unit(s)/Hr (13.5 mL/Hr) IV Continuous <Continuous>  insulin lispro (ADMELOG) corrective regimen sliding scale   SubCutaneous every 6 hours  melatonin 5 milliGRAM(s) Oral at bedtime  methocarbamol 500 milliGRAM(s) Oral every 8 hours  metoprolol tartrate 25 milliGRAM(s) Oral two times a day  mirtazapine 7.5 milliGRAM(s) Oral at bedtime  Nephro-elicia 1 Tablet(s) Oral daily  oxyCODONE    IR 10 milliGRAM(s) Oral once  pantoprazole  Injectable 40 milliGRAM(s) IV Push daily  sodium chloride 0.65% Nasal 1 Spray(s) Both Nostrils every 12 hours  sodium chloride 0.9% for Nebulization 3 milliLiter(s) Nebulizer every 6 hours  sodium chloride 0.9%. 1000 milliLiter(s) (10 mL/Hr) IV Continuous <Continuous>  sodium zirconium cyclosilicate 10 Gram(s) Oral <User Schedule>  traZODone 100 milliGRAM(s) Oral at bedtime    MEDICATIONS  (PRN):  acetaminophen     Tablet .. 650 milliGRAM(s) Oral every 6 hours PRN Mild Pain (1 - 3)  lidocaine/prilocaine Cream 1 Application(s) Topical daily PRN pre-HD  sodium chloride 0.9% lock flush 10 milliLiter(s) IV Push every 1 hour PRN Pre/post blood products, medications, blood draw, and to maintain line patency      NUTRITIONALLY PERTINENT LABS:   Na136 mmol/L Glu 104 mg/dL[H] K+ 5.4 mmol/L[H] Cr  4.51 mg/dL[H] BUN 35 mg/dL[H]  Phos 4.5 mg/dL  Alb 2.8 g/dL[L] ALT 9 U/L[L] AST 17 U/L Alkaline Phosphatase 91 U/L    A1C with Estimated Average Glucose Result: 5.6 % (24 @ 06:50)    Finger Sticks:  POCT Blood Glucose.: 101 mg/dL ( @ 05:31)  POCT Blood Glucose.: 115 mg/dL ( @ 00:50)  POCT Blood Glucose.: 111 mg/dL ( @ 18:21)  POCT Blood Glucose.: 194 mg/dL ( @ 12:00)      NUTRITIONALLY PERTINENT MEDICATIONS/LABS:  [x] Reviewed  [X] Relevant notes on medications/labs:  - insulin for glycemic control  -remeron which can stimulate appetite  -lokelma 3x/ week on non-HD days  - Noted potassium high on ,  and   - Noted phosphorus WNL     EDEMA:  [x] Reviewed  [X] Relevant notes: No edema per nursing flowsheets.     GI/ I&O:  [x] Reviewed  [X] Relevant notes: Patient reports current nausea. Reports episode of nausea/vomiting last night () and vomiting 5 days ago () patient feels the cause of the nausea/vomiting episodes is related to the tube feeding. Pt made aware the importance of the tube feeding and protocol for diet advancement.   [] Other:    SKIN:   [] No pressure injuries documented, per nursing flowsheet  [] Pressure injury previously noted  [X] Change in pressure injury documentation: Stage IV sacrum pressure injury per nursing flowsheets.   [X] Other:  -Per Wound Care : "Sacral/buttocks wound 2/2 skin failure, Rt upper back injury now resolved"  -midsternal surgical incision from CABG     ESTIMATED NEEDS:  [x] No change:  [] Updated:  Energy:  2519-5145 kcal/day (30-35 kcal/kg)  Protein:  109-140 g/day (1.4-1.8 g/kg)  Fluid:   ml/day or [x] defer to team  Based on: IBW 172lb/78kg    NUTRITION DIAGNOSIS:  [X] Prior Dx: Inadequate energy intake, Increased Nutrient Needs (protein-energy, micronutrients)  [] New Dx:    EDUCATION:  [] Yes:  [X] Not appropriate/warranted    NUTRITION CARE PLAN:  1. Diet: In setting of nausea/vomiting begin Vital 1.5 at 10 ml/hour increasing slowly at 10 ml/hour every 8 hours if patient is tolerating. Once nausea/vomiting resolved resume goal regimen of Vital 1.5 @ 70ml/hr x 24hr. Provides 1680 ml formula, 2520kcal, 114 grams of protein, 1284ml free water; meets 32 kcal/kg and 1.4 g/kg protein based on IBW 172lb/78kg.  - Defer diet advancement to team.   - Defer free water flushes to team.   2. Continue Nephro-elicia, defer vitamin C continuation to team to promote wound healing.     MONITORING AND EVALUATION:   RD remains available upon request and will follow up per protocol.    Mckenzie Dudley MS, Dietetic Intern ([employer])   Corinne Lima RDN CDN ([employer])

## 2025-02-28 NOTE — PROGRESS NOTE ADULT - ASSESSMENT
54M with PMH of HTN, HLD, ESRD on HD MWF via LUE AVF, ascites (requiring repeat paracenteses q4-6wks), NICM with moderate LV dysfunction w/ EF 35-40%(2023) and CAD s/p atherectomy + SALLIE to D1(4/11/2024) presents to [facility] transferred from [facility] for CABG eval  Nephro on board for HD needs.    ESRD on HD   Nephrologist Dr. Bailey  [facility] Schedule / Access:  LUE AVF  Center: [facility]  Pt s/p HD on 2/19 and 2/21.  s/p HD 2/24 uf 1.5L  S/p HD on 02/26 2 L UF  HD today  Keep MAP>65  Renal Diet  HD consent in chart     Hyper/Hypokalemia  Pt intermittently Hyperkalemia  HD as scheduled  now improved  Low K diet.  C/W Lokelma 10gm on nonHD days.  Monitor K.    HTN  BP fluctuating; controlled.  UF w/ HD as BP permits.  Low sodium diet.  Management per ICU  Monitor BP     CKD MBD  Check PTH   Low PO4 diet.  Not on binder.  Monitor Ca , PO4 daily     Anemia  CRISTIANE with HD  Repeat iron studies.  Transfuse if hgb <7    CAD with multivessel disease  CTICU following  s/p CABG 12/30    Hypoxic respiratory failure requiring Trach  Per surgery  S/p bronchoscopy, pulm following

## 2025-03-01 LAB
ALBUMIN SERPL ELPH-MCNC: 3.3 G/DL — SIGNIFICANT CHANGE UP (ref 3.3–5)
ALP SERPL-CCNC: 106 U/L — SIGNIFICANT CHANGE UP (ref 40–120)
ALT FLD-CCNC: 10 U/L — SIGNIFICANT CHANGE UP (ref 10–45)
ANION GAP SERPL CALC-SCNC: 15 MMOL/L — SIGNIFICANT CHANGE UP (ref 5–17)
APTT BLD: 33.3 SEC — SIGNIFICANT CHANGE UP (ref 24.5–35.6)
APTT BLD: 53.9 SEC — HIGH (ref 24.5–35.6)
APTT BLD: 60.1 SEC — HIGH (ref 24.5–35.6)
APTT BLD: >200 SEC — CRITICAL HIGH (ref 24.5–35.6)
AST SERPL-CCNC: 16 U/L — SIGNIFICANT CHANGE UP (ref 10–40)
BASOPHILS # BLD AUTO: 0.06 K/UL — SIGNIFICANT CHANGE UP (ref 0–0.2)
BASOPHILS NFR BLD AUTO: 0.8 % — SIGNIFICANT CHANGE UP (ref 0–2)
BILIRUB SERPL-MCNC: 0.5 MG/DL — SIGNIFICANT CHANGE UP (ref 0.2–1.2)
BUN SERPL-MCNC: 20 MG/DL — SIGNIFICANT CHANGE UP (ref 7–23)
CALCIUM SERPL-MCNC: 9.4 MG/DL — SIGNIFICANT CHANGE UP (ref 8.4–10.5)
CHLORIDE SERPL-SCNC: 95 MMOL/L — LOW (ref 96–108)
CO2 SERPL-SCNC: 28 MMOL/L — SIGNIFICANT CHANGE UP (ref 22–31)
CREAT SERPL-MCNC: 3.15 MG/DL — HIGH (ref 0.5–1.3)
EGFR: 22 ML/MIN/1.73M2 — LOW
EGFR: 22 ML/MIN/1.73M2 — LOW
EOSINOPHIL # BLD AUTO: 0.57 K/UL — HIGH (ref 0–0.5)
EOSINOPHIL NFR BLD AUTO: 7.7 % — HIGH (ref 0–6)
GLUCOSE SERPL-MCNC: 105 MG/DL — HIGH (ref 70–99)
HCT VFR BLD CALC: 28.9 % — LOW (ref 39–50)
HGB BLD-MCNC: 8.9 G/DL — LOW (ref 13–17)
IMM GRANULOCYTES NFR BLD AUTO: 1.9 % — HIGH (ref 0–0.9)
INR BLD: 1.17 RATIO — HIGH (ref 0.85–1.16)
INR BLD: 1.22 RATIO — HIGH (ref 0.85–1.16)
INR BLD: 1.3 RATIO — HIGH (ref 0.85–1.16)
LYMPHOCYTES # BLD AUTO: 0.51 K/UL — LOW (ref 1–3.3)
LYMPHOCYTES # BLD AUTO: 6.9 % — LOW (ref 13–44)
MAGNESIUM SERPL-MCNC: 2.2 MG/DL — SIGNIFICANT CHANGE UP (ref 1.6–2.6)
MCHC RBC-ENTMCNC: 30.8 G/DL — LOW (ref 32–36)
MCHC RBC-ENTMCNC: 31.2 PG — SIGNIFICANT CHANGE UP (ref 27–34)
MCV RBC AUTO: 101.4 FL — HIGH (ref 80–100)
MONOCYTES # BLD AUTO: 0.9 K/UL — SIGNIFICANT CHANGE UP (ref 0–0.9)
MONOCYTES NFR BLD AUTO: 12.2 % — SIGNIFICANT CHANGE UP (ref 2–14)
NEUTROPHILS # BLD AUTO: 5.19 K/UL — SIGNIFICANT CHANGE UP (ref 1.8–7.4)
NEUTROPHILS NFR BLD AUTO: 70.5 % — SIGNIFICANT CHANGE UP (ref 43–77)
NRBC BLD AUTO-RTO: 0 /100 WBCS — SIGNIFICANT CHANGE UP (ref 0–0)
PHOSPHATE SERPL-MCNC: 3.2 MG/DL — SIGNIFICANT CHANGE UP (ref 2.5–4.5)
PLATELET # BLD AUTO: 304 K/UL — SIGNIFICANT CHANGE UP (ref 150–400)
POTASSIUM SERPL-MCNC: 4.2 MMOL/L — SIGNIFICANT CHANGE UP (ref 3.5–5.3)
POTASSIUM SERPL-SCNC: 4.2 MMOL/L — SIGNIFICANT CHANGE UP (ref 3.5–5.3)
PROT SERPL-MCNC: 7.6 G/DL — SIGNIFICANT CHANGE UP (ref 6–8.3)
PROTHROM AB SERPL-ACNC: 13.3 SEC — SIGNIFICANT CHANGE UP (ref 9.9–13.4)
PROTHROM AB SERPL-ACNC: 14 SEC — HIGH (ref 9.9–13.4)
PROTHROM AB SERPL-ACNC: 14.8 SEC — HIGH (ref 9.9–13.4)
RBC # FLD: 21.3 % — HIGH (ref 10.3–14.5)
SODIUM SERPL-SCNC: 138 MMOL/L — SIGNIFICANT CHANGE UP (ref 135–145)
WBC # BLD: 7.37 K/UL — SIGNIFICANT CHANGE UP (ref 3.8–10.5)
WBC # FLD AUTO: 7.37 K/UL — SIGNIFICANT CHANGE UP (ref 3.8–10.5)

## 2025-03-01 PROCEDURE — 99291 CRITICAL CARE FIRST HOUR: CPT

## 2025-03-01 PROCEDURE — 71045 X-RAY EXAM CHEST 1 VIEW: CPT | Mod: 26

## 2025-03-01 PROCEDURE — 71045 X-RAY EXAM CHEST 1 VIEW: CPT | Mod: 26,77

## 2025-03-01 PROCEDURE — 74018 RADEX ABDOMEN 1 VIEW: CPT | Mod: 26

## 2025-03-01 RX ORDER — ONDANSETRON HCL/PF 4 MG/2 ML
4 VIAL (ML) INJECTION ONCE
Refills: 0 | Status: COMPLETED | OUTPATIENT
Start: 2025-03-01 | End: 2025-03-01

## 2025-03-01 RX ORDER — METOCLOPRAMIDE HCL 10 MG
10 TABLET ORAL ONCE
Refills: 0 | Status: COMPLETED | OUTPATIENT
Start: 2025-03-01 | End: 2025-03-01

## 2025-03-01 RX ORDER — METOCLOPRAMIDE HCL 10 MG
5 TABLET ORAL EVERY 8 HOURS
Refills: 0 | Status: COMPLETED | OUTPATIENT
Start: 2025-03-01 | End: 2025-03-03

## 2025-03-01 RX ORDER — METOPROLOL SUCCINATE 50 MG/1
2.5 TABLET, EXTENDED RELEASE ORAL ONCE
Refills: 0 | Status: COMPLETED | OUTPATIENT
Start: 2025-03-01 | End: 2025-03-01

## 2025-03-01 RX ORDER — MEROPENEM 1 G/30ML
500 INJECTION INTRAVENOUS EVERY 24 HOURS
Refills: 0 | Status: COMPLETED | OUTPATIENT
Start: 2025-03-01 | End: 2025-03-06

## 2025-03-01 RX ADMIN — Medication 4 MILLIGRAM(S): at 13:45

## 2025-03-01 RX ADMIN — HEPARIN SODIUM 14.5 UNIT(S)/HR: 1000 INJECTION INTRAVENOUS; SUBCUTANEOUS at 12:51

## 2025-03-01 RX ADMIN — SODIUM ZIRCONIUM CYCLOSILICATE 10 GRAM(S): 5 POWDER, FOR SUSPENSION ORAL at 06:21

## 2025-03-01 RX ADMIN — METHOCARBAMOL 500 MILLIGRAM(S): 500 TABLET, FILM COATED ORAL at 22:22

## 2025-03-01 RX ADMIN — Medication 3 MILLILITER(S): at 23:14

## 2025-03-01 RX ADMIN — Medication 5 MILLIGRAM(S): at 22:23

## 2025-03-01 RX ADMIN — Medication 1 APPLICATION(S): at 06:21

## 2025-03-01 RX ADMIN — Medication 1 APPLICATION(S): at 22:23

## 2025-03-01 RX ADMIN — Medication 5 MILLIGRAM(S): at 22:22

## 2025-03-01 RX ADMIN — MIRTAZAPINE 7.5 MILLIGRAM(S): 30 TABLET, FILM COATED ORAL at 22:22

## 2025-03-01 RX ADMIN — METOPROLOL SUCCINATE 25 MILLIGRAM(S): 50 TABLET, EXTENDED RELEASE ORAL at 06:28

## 2025-03-01 RX ADMIN — Medication 40 MILLIGRAM(S): at 12:44

## 2025-03-01 RX ADMIN — Medication 3 MILLILITER(S): at 05:10

## 2025-03-01 RX ADMIN — ATORVASTATIN CALCIUM 80 MILLIGRAM(S): 80 TABLET, FILM COATED ORAL at 22:22

## 2025-03-01 RX ADMIN — METOPROLOL SUCCINATE 2.5 MILLIGRAM(S): 50 TABLET, EXTENDED RELEASE ORAL at 19:35

## 2025-03-01 RX ADMIN — Medication 10 MILLIGRAM(S): at 15:20

## 2025-03-01 RX ADMIN — Medication 3 MILLILITER(S): at 12:31

## 2025-03-01 RX ADMIN — METHOCARBAMOL 500 MILLIGRAM(S): 500 TABLET, FILM COATED ORAL at 05:40

## 2025-03-01 RX ADMIN — Medication 100 MILLIGRAM(S): at 22:22

## 2025-03-01 RX ADMIN — AMIODARONE HYDROCHLORIDE 200 MILLIGRAM(S): 50 INJECTION, SOLUTION INTRAVENOUS at 05:40

## 2025-03-01 NOTE — PROVIDER CONTACT NOTE (OTHER) - SITUATION
Patient is noted to be vomited and be feeling nauseous. Patient refusing oral medications and ambulating at this time.

## 2025-03-01 NOTE — PROGRESS NOTE ADULT - ASSESSMENT
54M with PMH of HTN, HLD, ESRD on HD MWF via LUE AVF, ascites (requiring repeat paracenteses q4-6wks), NICM with moderate LV dysfunction w/ EF 35-40%() and CAD s/p atherectomy + SALLIE to D1(2024) presents to [facility] transferred from [facility].  Cardiac cath which showed pLAD 70% ISR, mLCx 70%, D1 severe dz.   Patient now transferred to [facility] CTS Dr. Rivers for CABG eval      # CAD s/p NSTEMI  Hx CAD s/p PCI LAD   Presented with ADHF elevated troponins  Positive NST1-Cath- pLAD 70% ISR, mLCx 70%, D1 severe dz.   TTE EF 35% Severe TRWas on Plavix-p2y12- 321 been off Plavix since -POD#1-C3L free LIMA-LAD; SVG-Diag; LGE-kiaoER-Eyjzchbyp on  Sinus rhythm,Amio for AF prophylaxis   Atrial Flutter on TC during day Ambulating  -Increased congestion on CXR had HD yesterday remains in AFl   -Atrial Flutter HFTC 40L/30% BP stable     -Atrial Flutter tolerating TC for HD MWF-consider DOAC   OOB Abd US moderate 4 quad ascites noted remains in Atrial Flutter~~70's   TC Atrial Flutter  -s/p Bronch/BAL remains in AFl  Trach downsized 7   -Tolerating TC AFl rate controlled On Heparin gtt transition to DOAC      # Acute on Chronic Systolic Heart Failure now improved  EF-35% ,severe TR  Had HD post op  GDMT post op  LVEF improved post bypass   -TTE EF 40%,trace effusion  ECG c/w pericarditis-was on colchicine   01/15-TTE EF 55%  -TTE EF 60%   -Normal LV systolic function Moderate pericardial effusion  -On TC-HF and Vent support at nights   02/10-Vent HS with T Collar trial Ambulated Remains in AF  Repeat TTE Normal LV Systolic function Small Pericardial effusion decreased from 2/3        Vascular evaluated  AVGraft /?steal causing RV failure-'' Very low suspicion of steal Sd and AVF causing current clinical state. ''   VA Duplex Hemodialysis Access, Left (25 @ 13:35) >   Arterial flow volume of 1301 mL/m, and the venous flow volume of  1492 mL/m are consistent with a normally functioning dialysis access  fistula.      # Ascites  Hx chronic ascites  Has drainage q4-6 weeks  Last drained -4600mL  ? ascites related to severe TR  Had jasqrwnzqhcc-Ghysymt-zt growth   CT Abdomen 01/10-Large volume ascites-  US abdomen--Small to moderate volume abdominal/pelvic ascites  US abdomen  Moderate ascites  US Abdomen -Moderate 4 quad ascites    # ESRD  Now on  HD-MWF

## 2025-03-01 NOTE — PROGRESS NOTE ADULT - SUBJECTIVE AND OBJECTIVE BOX
[facility]    Dr. Nica Styles     Office [phone] (9 am to 5 pm)  Service: [phone] (5pm to 9am)  Also Available on TEAMS      RENAL PROGRESS NOTE: DATE OF SERVICE 03-01-25 @ 08:24    Patient is a 54y old  Male who presents with a chief complaint of CAD s/p CABG (01 Mar 2025 07:46)      Patient seen and examined at bedside. No chest pain/sob    VITALS:  T(F): 98.2 (03-01-25 @ 04:00), Max: 98.2 (03-01-25 @ 00:00)  HR: 75 (03-01-25 @ 07:00)  BP: 118/45 (03-01-25 @ 06:00)  RR: 14 (03-01-25 @ 07:00)  SpO2: 100% (03-01-25 @ 07:00)  Wt(kg): --    02-28 @ 07:01  -  03-01 @ 07:00  --------------------------------------------------------  IN: 1194.5 mL / OUT: 2600 mL / NET: -1405.5 mL          PHYSICAL EXAM:  Constitutional: NAD  Neck: No JVD  Respiratory: CTAB, no wheezes, rales or rhonchi  Cardiovascular: S1, S2, RRR  Gastrointestinal: BS+, soft, NT/ND  Extremities: No peripheral edema    Hospital Medications:   MEDICATIONS  (STANDING):  aMIOdarone    Tablet 200 milliGRAM(s) Oral daily  ascorbic acid 500 milliGRAM(s) Oral daily  aspirin  chewable 81 milliGRAM(s) Oral daily  atorvastatin 80 milliGRAM(s) Oral at bedtime  chlorhexidine 2% Cloths 1 Application(s) Topical daily  chlorhexidine 4% Liquid 1 Application(s) Topical <User Schedule>  dextrose 50% Injectable 50 milliLiter(s) IV Push every 15 minutes  epoetin ignacia (EPOGEN) Injectable 04479 Unit(s) IV Push <User Schedule>  ferrous    sulfate Liquid 300 milliGRAM(s) Enteral Tube daily  heparin  Infusion 1300 Unit(s)/Hr (14.5 mL/Hr) IV Continuous <Continuous>  insulin lispro (ADMELOG) corrective regimen sliding scale   SubCutaneous every 6 hours  melatonin 5 milliGRAM(s) Oral at bedtime  methocarbamol 500 milliGRAM(s) Oral every 8 hours  metoprolol tartrate 25 milliGRAM(s) Oral two times a day  mirtazapine 7.5 milliGRAM(s) Oral at bedtime  Nephro-elicia 1 Tablet(s) Oral daily  pantoprazole  Injectable 40 milliGRAM(s) IV Push daily  sodium chloride 0.65% Nasal 1 Spray(s) Both Nostrils every 12 hours  sodium chloride 0.9% for Nebulization 3 milliLiter(s) Nebulizer every 6 hours  sodium chloride 0.9%. 1000 milliLiter(s) (10 mL/Hr) IV Continuous <Continuous>  sodium zirconium cyclosilicate 10 Gram(s) Oral <User Schedule>  traZODone 100 milliGRAM(s) Oral at bedtime      LABS:  03-01    138  |  95[L]  |  20  ----------------------------<  105[H]  4.2   |  28  |  3.15[H]    Ca    9.4      01 Mar 2025 00:40  Phos  3.2     03-01  Mg     2.2     03-01    TPro  7.6  /  Alb  3.3  /  TBili  0.5  /  DBili      /  AST  16  /  ALT  10  /  AlkPhos  106  03-01    Creatinine Trend: 3.15 <--, 4.51 <--, 3.45 <--, 5.17 <--, 4.17 <--, 5.72 <--, 4.86 <--    Albumin: 3.3 g/dL (03-01 @ 00:40)  Phosphorus: 3.2 mg/dL (03-01 @ 00:40)                              8.9    7.37  )-----------( 304      ( 01 Mar 2025 00:38 )             28.9     Urine Studies:  Urinalysis - [03-01-25 @ 00:40]      Color  / Appearance  / SG  / pH       Gluc 105 / Ketone   / Bili  / Urobili        Blood  / Protein  / Leuk Est  / Nitrite       RBC  / WBC  / Hyaline  / Gran  / Sq Epi  / Non Sq Epi  / Bacteria       Iron 29, TIBC 143, %sat 20      [12-19-24 @ 05:00]  Ferritin 904      [12-19-24 @ 05:00]  TSH 4.75      [12-24-24 @ 06:50]        RADIOLOGY & ADDITIONAL STUDIES:

## 2025-03-01 NOTE — PROGRESS NOTE ADULT - ASSESSMENT
54M with PMH of HTN, HLD, ESRD on HD MWF via LUE AVF, ascites (requiring repeat paracenteses q4-6wks), NICM with moderate LV dysfunction w/ EF 35-40%(2023) and CAD s/p atherectomy + SALLIE to D1(4/11/2024) presents to [facility] transferred from [facility] for CABG eval  Nephro on board for HD needs.    ESRD on HD   Nephrologist Dr. Bailey  [facility] Schedule / Access:  LUE AVF  Center: [facility]  Pt s/p HD on 2/19 and 2/21.  s/p HD 2/24 uf 1.5L  S/p HD on 02/26 2 L UF and 02/28 2 L UF again   Next HD likely Monday  Keep MAP>65  Renal Diet  HD consent in chart     Hyper/Hypokalemia  Pt intermittently Hyperkalemia  HD as scheduled  now improved  Low K diet.  C/W Lokelma 10gm on nonHD days.  Monitor K.    HTN  BP fluctuating; controlled.  UF w/ HD as BP permits.  Low sodium diet.  Management per ICU  Monitor BP     CKD MBD  Check PTH   Low PO4 diet.  Not on binder.  Monitor Ca , PO4 daily     Anemia  CRISTIANE with HD  Repeat iron studies.  Transfuse if hgb <7    CAD with multivessel disease  CTICU following  s/p CABG 12/30    Hypoxic respiratory failure requiring Trach  Per surgery  S/p bronchoscopy, pulm following

## 2025-03-01 NOTE — PROVIDER CONTACT NOTE (OTHER) - ACTION/TREATMENT ORDERED:
as per MYRA Barakat tube feeds held , oral medications held,  IVP zofran given. pt continued to feel nauseous as per MYRA Barakat given IVP Reglan continue to hold feed and 6pm medications will reassess

## 2025-03-01 NOTE — PROGRESS NOTE ADULT - SUBJECTIVE AND OBJECTIVE BOX
Patient seen and examined at the bedside.    Remained critically ill on continuous ICU monitoring.    OBJECTIVE:  Vital Signs Last 24 Hrs  T(C): 36.9 (01 Mar 2025 08:00), Max: 36.9 (01 Mar 2025 08:00)  T(F): 98.5 (01 Mar 2025 08:00), Max: 98.5 (01 Mar 2025 08:00)  HR: 76 (01 Mar 2025 09:00) (70 - 112)  BP: 133/54 (01 Mar 2025 08:00) (100/35 - 162/70)  BP(mean): 78 (01 Mar 2025 08:00) (51 - 100)  RR: 13 (01 Mar 2025 09:00) (9 - 28)  SpO2: 100% (01 Mar 2025 09:00) (77% - 100%)    Parameters below as of 01 Mar 2025 08:00  Patient On (Oxygen Delivery Method): tracheostomy collar  O2 Flow (L/min): 7  O2 Concentration (%): 40      Physical Exam:   General: NAD   Neurology: nonfocal   Eyes: bilateral pupils equal and reactive   ENT/Neck: Neck supple, trachea midline, No JVD   Respiratory: Clear bilaterally   CV: S1S2, no murmurs         [x] Sinus rhythm   Abdominal: Soft, NT, ND +BS   Extremities: 1-2+ pedal edema noted, + peripheral pulses   Skin: No Rashes, Hematoma, Ecchymosis                           Assessment:  53 yo M s/p CABG x 3 on 12/30/24    Postop acute respiratory failure  Acute blood loss anemia  ESRD on iHD   hx of ESBL   hx of VRE  Serratia infection  MSRA infection   sacral decub         Plan:   ***Neuro***  [x] Nonfocal   [x] Tylenol for pain management  Melatonin, Trazadone, Remeron nightly  Robaxin   Post operative neuro assessment     ***Cardiovascular***  Invasive hemodynamic monitoring, assess perfusion indices   SR / Hct 28.9 / Lactate 1.0  [x] Beta blocker 25 mgs  [x] Amiodarone for a fib prophylaxis   Continuos reassessment of hemodynamics   [x] Heparin 1300 units/hr  [x] ASA [x] Statin   Serial EKG and cardiac enzymes     ***Pulmonary***  [x] Tracheostomy collar   Titration of FiO2 and PEEP, follow SpO2, CXR, blood gasses     ***GI***   [x] Tube Feeds at goal rate of 70 ml/hr  [x] Protonix       ***Renal***   Continue to monitor I/Os, BUN/Creatinine.   Replete lytes PRN    ***ID***  No active antibiotic coverage      ***Endocrine***  [x] DM2: HbA1c 5.6%             - [x] ISS             - Need tight glycemic control to prevent wound infection.      Patient requires continuous monitoring with bedside rhythm monitoring, pulse oximetry monitoring, and continuous central venous and arterial pressure monitoring; and intermittent blood gas analysis. Care plan discussed with the ICU care team.   Patient remained critical, at risk for life threatening decompensation.    I have spent 30 minutes providing critical care management to this patient.    By signing my name below, I, Angelo Barbosa, attest that this documentation has been prepared under the direction and in the presence of MYRA Ellington   Electronically signed: Evelio Ag, 03-01-25 @ 09:03    I, Yuval Rico, personally performed the services described in this documentation. all medical record entries made by the scribe were at my direction and in my presence. I have reviewed the chart and agree that the record reflects my personal performance and is accurate and complete  Electronically signed: MYRA Ellington  Patient seen and examined at the bedside.    Remained critically ill on continuous ICU monitoring.    OBJECTIVE:  Vital Signs Last 24 Hrs  T(C): 36.9 (01 Mar 2025 08:00), Max: 36.9 (01 Mar 2025 08:00)  T(F): 98.5 (01 Mar 2025 08:00), Max: 98.5 (01 Mar 2025 08:00)  HR: 76 (01 Mar 2025 09:00) (70 - 112)  BP: 133/54 (01 Mar 2025 08:00) (100/35 - 162/70)  BP(mean): 78 (01 Mar 2025 08:00) (51 - 100)  RR: 13 (01 Mar 2025 09:00) (9 - 28)  SpO2: 100% (01 Mar 2025 09:00) (77% - 100%)    Parameters below as of 01 Mar 2025 08:00  Patient On (Oxygen Delivery Method): tracheostomy collar  O2 Flow (L/min): 7  O2 Concentration (%): 40      Physical Exam:   General: NAD, resting in the bed   Neurology: nonfocal, ambulates   Eyes: bilateral pupils equal and reactive   ENT/Neck: Neck supple, trachea midline, No JVD, + trach site--cdi    Respiratory: diminished in LLL    CV: S1S2, no murmurs         [x]afib, rate controlled    Abdominal: Soft, NT, ND +BS, + vomiting    Extremities: No pedal edema noted, + peripheral pulses   Skin: wound vac to sacrum                           Assessment:  53 yo M s/p CABG x 3 on 12/30/24    Postop acute respiratory failure  Acute blood loss anemia  ESRD on iHD   hx of ESBL   hx of VRE  Serratia infection  MSRA infection   sacral decub       Plan:   ***Neuro***  [x] Nonfocal   [x] Tylenol for pain management  Melatonin, Trazadone, Remeron nightly  Robaxin   Post operative neuro assessment     ***Cardiovascular***  SR / Hct 28.9 / Lactate 1.0  [x]Lopressor 25 mg BID  [x] Amiodarone for a fib  [x] Heparin 1450 units/hr  [x] ASA [x] Statin     ***Pulmonary***  [x] Tracheostomy collar   CXR with continued Left sided collapse  Last bronch yesterday   PM CXR with improvement     ***GI***   [x] Tube Feeds at goal rate of 70 ml/hr-->held in setting of N/V, f/up abdominal XR   [x] Protonix         ***Renal***   Continue to monitor I/Os, BUN/Creatinine.   Replete lytes PRN  HD yesterday, next HD session planned for Monday     ***ID***  No active antibiotic coverage      ***Endocrine***  [x] DM2: HbA1c 5.6%             - [x] ISS             - Need tight glycemic control to prevent wound infection.      Patient requires continuous monitoring with bedside rhythm monitoring, pulse oximetry monitoring, and continuous central venous and arterial pressure monitoring; and intermittent blood gas analysis. Care plan discussed with the ICU care team.   Patient remained critical, at risk for life threatening decompensation.    I have spent 35 minutes providing critical care management to this patient.    By signing my name below, I, Angelo Barbosa, attest that this documentation has been prepared under the direction and in the presence of MYRA Ellington   Electronically signed: Evelio Ag, 03-01-25 @ 09:03    IYuval, personally performed the services described in this documentation. all medical record entries made by the shubhamibdarlene were at my direction and in my presence. I have reviewed the chart and agree that the record reflects my personal performance and is accurate and complete  Electronically signed: MYRA Ellington

## 2025-03-01 NOTE — PROGRESS NOTE ADULT - SUBJECTIVE AND OBJECTIVE BOX
MR#[id number]  PATIENT NAME:RAAD CANO    DATE OF SERVICE: 03-01-25 @ 07:46  Patient was seen and examined by Solo Quick MD on    03-01-25 @ 07:46 .  Interim events noted.Consultant notes ,Labs,Telemetry reviewed by me       HOSPITAL COURSE: HPI:  54M with PMH of HTN, HLD, ESRD on HD MWF via LUE AVF, ascites (requiring repeat paracenteses q4-6wks), NICM with moderate LV dysfunction w/ EF 35-40%(2023) and CAD s/p atherectomy + SALLIE to D1(4/11/2024) presents to [facility] transferred from [facility]. Patient initially presented to [facility] ED with shortness of breath. Of note he was recently admitted from 09/27-12/02 for acute cholecystitis s/p cholecystectomy. In [facility] ED, pt was hypoxic to 86% on RA, trop 1.7K, EKG no new changes, CXR fluid overload. Admitted for AHRF 2/2 fluid overload. Pt received HD on 12/18, 12/19, 12/20 and 12/22. DAPT was resumed, TTE showed improvement in LVEF to 40-45%. Stress test was abnormal with 1mm inferior STD, reversible ischemia in the inferior wall and fixed defects in the lateral, anterior and apical walls with post stress EF of 24%. Pt was then transferred to [facility] for cardiac cath which showed pLAD 70% ISR, mLCx 70%, D1 severe dz. Patient now transferred to [facility] CTS Dr. Rivers for CABG eval. Patient denies any current SOB or chest pain on admission. Otherwise denies headaches, abdominal pain, urinary or bowel changes, fevers, chills, N/V/D, sick contacts.  (24 Dec 2024 05:07)      INTERIM EVENTS:Patient seen at bedside ,interim events noted.  12/23 Cardiac cath  pLAD 70% ISR, mLCx 70%, D1 severe dz.   12/31-POD#1-C3L free LIMA-LAD; SVG-Diag; SVG-leftPL Awake OOB extubated Sinus rhythm on Dobutamine gtt  02/19 Had Bronch yesterday on vent HS remains in Atrial Flutter  02/21-Awake on HFTC at night Ambulating-Atrial Flutter  02/22-Had Midline Remains in Atrial Flutter on TC  02/23-Lying in bed still on NGT feeds on TC-HFTC-40L/30% Atrial Flutter  02/24-Awake Tolerating TC Atrial Flutter~~'s  For HD today  02/26-OOB on TC not dyspneac remains in AFl on Tube feeds  02/27-Had HD remains in AFl on TC  02/28 Awake had Bronch Trach downsized to 7 AFl    03/01-Awake Atrial Flutter no dyspnea TC tolerating              MEDICATIONS  (STANDING):  aMIOdarone    Tablet 200 milliGRAM(s) Oral daily  ascorbic acid 500 milliGRAM(s) Oral daily  aspirin  chewable 81 milliGRAM(s) Oral daily  atorvastatin 80 milliGRAM(s) Oral at bedtime  chlorhexidine 2% Cloths 1 Application(s) Topical daily  chlorhexidine 4% Liquid 1 Application(s) Topical <User Schedule>  dextrose 50% Injectable 50 milliLiter(s) IV Push every 15 minutes  epoetin ignacia (EPOGEN) Injectable 83677 Unit(s) IV Push <User Schedule>  ferrous    sulfate Liquid 300 milliGRAM(s) Enteral Tube daily  heparin  Infusion 1300 Unit(s)/Hr (14.5 mL/Hr) IV Continuous <Continuous>  insulin lispro (ADMELOG) corrective regimen sliding scale   SubCutaneous every 6 hours  melatonin 5 milliGRAM(s) Oral at bedtime  methocarbamol 500 milliGRAM(s) Oral every 8 hours  metoprolol tartrate 25 milliGRAM(s) Oral two times a day  mirtazapine 7.5 milliGRAM(s) Oral at bedtime  Nephro-elicia 1 Tablet(s) Oral daily  pantoprazole  Injectable 40 milliGRAM(s) IV Push daily  sodium chloride 0.65% Nasal 1 Spray(s) Both Nostrils every 12 hours  sodium chloride 0.9% for Nebulization 3 milliLiter(s) Nebulizer every 6 hours  sodium chloride 0.9%. 1000 milliLiter(s) (10 mL/Hr) IV Continuous <Continuous>  sodium zirconium cyclosilicate 10 Gram(s) Oral <User Schedule>  traZODone 100 milliGRAM(s) Oral at bedtime    MEDICATIONS  (PRN):  acetaminophen     Tablet .. 650 milliGRAM(s) Oral every 6 hours PRN Mild Pain (1 - 3)  lidocaine/prilocaine Cream 1 Application(s) Topical daily PRN pre-HD  sodium chloride 0.9% lock flush 10 milliLiter(s) IV Push every 1 hour PRN Pre/post blood products, medications, blood draw, and to maintain line patency            REVIEW OF SYSTEMS:  Constitutional: [ ] fever, [ ]weight loss,  [ ]fatigue [ ]weight gain  Eyes: [ ] visual changes  Respiratory: [ ]shortness of breath;  [ ] cough, [ ]wheezing, [ ]chills, [ ]hemoptysis  Cardiovascular: [ ] chest pain, [ ]palpitations, [ ]dizziness,  [ ]leg swelling[ ]orthopnea[ ]PND  Gastrointestinal: [ ] abdominal pain, [ ]nausea, [ ]vomiting,  [ ]diarrhea [ ]Constipation [ ]Melena  Genitourinary: [ ] dysuria, [ ] hematuria [ ]Vigil  Neurologic: [ ] headaches [ ] tremors[ ]weakness [ ]Paralysis Right[ ] Left[ ]  Skin: [ ] itching, [ ]burning, [ ] rashes  Endocrine: [ ] heat or cold intolerance  Musculoskeletal: [ ] joint pain or swelling; [ ] muscle, back, or extremity pain  Psychiatric: [ ] depression, [ ]anxiety, [ ]mood swings, or [ ]difficulty sleeping  Hematologic: [ ] easy bruising, [ ] bleeding gums    [ ] All remaining systems negative except as per above.   [ ]Unable to obtain.  [x] No change in ROS since admission      Vital Signs Last 24 Hrs  T(C): 36.8 (01 Mar 2025 04:00), Max: 36.8 (01 Mar 2025 00:00)  T(F): 98.2 (01 Mar 2025 04:00), Max: 98.2 (01 Mar 2025 00:00)  HR: 75 (01 Mar 2025 07:00) (70 - 112)  BP: 118/45 (01 Mar 2025 06:00) (100/35 - 173/70)  BP(mean): 65 (01 Mar 2025 06:00) (51 - 101)  RR: 14 (01 Mar 2025 07:00) (9 - 28)  SpO2: 100% (01 Mar 2025 07:00) (77% - 100%)    Parameters below as of 01 Mar 2025 05:28  Patient On (Oxygen Delivery Method): tracheostomy collar      I&O's Summary    28 Feb 2025 07:01  -  01 Mar 2025 07:00  --------------------------------------------------------  IN: 1194.5 mL / OUT: 2600 mL / NET: -1405.5 mL        PHYSICAL EXAM:  General: No acute distress BMI-24  HEENT: EOMI, PERRL  Neck: Supple, [ ] JVD[x] Trach  Lungs: Equal air entry bilaterally; [ ] rales [ ] wheezing [ ] rhonchi  Heart: Regular rate and rhythm; [x ] murmur   2/6 [ x] systolic [ ] diastolic [ ] radiation[ ] rubs [ ]  gallops  Abdomen: Nontender, bowel sounds present  Extremities: No clubbing, cyanosis, [ ] edema [ ]Pulses  equal and intact  Nervous system:  Alert & Oriented X3, no focal deficits  Psychiatric: Normal affect  Skin: No rashes or lesions    LABS:  03-01    138  |  95[L]  |  20  ----------------------------<  105[H]  4.2   |  28  |  3.15[H]    Ca    9.4      01 Mar 2025 00:40  Phos  3.2     03-01  Mg     2.2     03-01    TPro  7.6  /  Alb  3.3  /  TBili  0.5  /  DBili  x   /  AST  16  /  ALT  10  /  AlkPhos  106  03-01    Creatinine Trend: 3.15<--, 4.51<--, 3.45<--, 5.17<--, 4.17<--, 5.72<--                        8.9    7.37  )-----------( 304      ( 01 Mar 2025 00:38 )             28.9     PT/INR - ( 01 Mar 2025 00:38 )   PT: 13.3 sec;   INR: 1.17 ratio         PTT - ( 01 Mar 2025 00:38 )  PTT:33.3 sec       Xray Chest 1 View- PORTABLE-Urgent (Xray Chest 1 View- PORTABLE-Urgent .) (02.27.25 @ 07:35) >  COMPARISON: Chest x-ray 2/26/2023).    FINDINGS:  2/26/2025 6:05 AM:  Patient is status post sternotomy. Enteric tube courses over the   diaphragm with tip overlying the stomach.  Midline sternotomy wires.  The heart is unchanged in size. There are perihilar interstitial   opacities. Small left pleural effusion. There is no pneumothorax.    02/27/2025 6:33 AM:.  Worsening opacification the left hemithorax.  There is no pneumothorax.    IMPRESSION:  Worsening opacification of the left hemithorax with minimal residual  aeration of the left upper lobe.      US Abdomen Limited (02.25.25 @ 15:56) >  IMPRESSION:  Moderate volume ascites seen in all 4 quadrants. Deepest pocket at the  right lower quadrant.    Right pleural effusion.     TTE Limited W or WO Ultrasound Enhancing Agent (02.18.25 @ 13:54) >  CONCLUSIONS:      1. Left ventricular systolic function is normal.   2. Enlarged right ventricular cavity size and mildly reduced right ventricular systolic function.   3. Left pleural effusion noted.   4. Trace pericardial effusion.

## 2025-03-02 LAB
-  AMPICILLIN/SULBACTAM: SIGNIFICANT CHANGE UP
-  AMPICILLIN: SIGNIFICANT CHANGE UP
-  CEFAZOLIN: SIGNIFICANT CHANGE UP
-  CEFEPIME: SIGNIFICANT CHANGE UP
-  CEFTRIAXONE: SIGNIFICANT CHANGE UP
-  CIPROFLOXACIN: SIGNIFICANT CHANGE UP
-  ERTAPENEM: SIGNIFICANT CHANGE UP
-  GENTAMICIN: SIGNIFICANT CHANGE UP
-  IMIPENEM: SIGNIFICANT CHANGE UP
-  LEVOFLOXACIN: SIGNIFICANT CHANGE UP
-  MEROPENEM: SIGNIFICANT CHANGE UP
-  TOBRAMYCIN: SIGNIFICANT CHANGE UP
-  TRIMETHOPRIM/SULFAMETHOXAZOLE: SIGNIFICANT CHANGE UP
ALBUMIN SERPL ELPH-MCNC: 2.8 G/DL — LOW (ref 3.3–5)
ALBUMIN SERPL ELPH-MCNC: 3 G/DL — LOW (ref 3.3–5)
ALP SERPL-CCNC: 87 U/L — SIGNIFICANT CHANGE UP (ref 40–120)
ALP SERPL-CCNC: 92 U/L — SIGNIFICANT CHANGE UP (ref 40–120)
ALT FLD-CCNC: 10 U/L — SIGNIFICANT CHANGE UP (ref 10–45)
ALT FLD-CCNC: 12 U/L — SIGNIFICANT CHANGE UP (ref 10–45)
AMYLASE P1 CFR SERPL: 39 U/L — SIGNIFICANT CHANGE UP (ref 25–125)
ANION GAP SERPL CALC-SCNC: 12 MMOL/L — SIGNIFICANT CHANGE UP (ref 5–17)
ANION GAP SERPL CALC-SCNC: 13 MMOL/L — SIGNIFICANT CHANGE UP (ref 5–17)
APTT BLD: 40.1 SEC — HIGH (ref 24.5–35.6)
APTT BLD: 48 SEC — HIGH (ref 24.5–35.6)
APTT BLD: 74.5 SEC — HIGH (ref 24.5–35.6)
APTT BLD: 77.1 SEC — HIGH (ref 24.5–35.6)
APTT BLD: >200 SEC — CRITICAL HIGH (ref 24.5–35.6)
AST SERPL-CCNC: 20 U/L — SIGNIFICANT CHANGE UP (ref 10–40)
AST SERPL-CCNC: 22 U/L — SIGNIFICANT CHANGE UP (ref 10–40)
BILIRUB SERPL-MCNC: 0.4 MG/DL — SIGNIFICANT CHANGE UP (ref 0.2–1.2)
BILIRUB SERPL-MCNC: 0.4 MG/DL — SIGNIFICANT CHANGE UP (ref 0.2–1.2)
BUN SERPL-MCNC: 28 MG/DL — HIGH (ref 7–23)
BUN SERPL-MCNC: 30 MG/DL — HIGH (ref 7–23)
CALCIUM SERPL-MCNC: 9 MG/DL — SIGNIFICANT CHANGE UP (ref 8.4–10.5)
CALCIUM SERPL-MCNC: 9.3 MG/DL — SIGNIFICANT CHANGE UP (ref 8.4–10.5)
CHLORIDE SERPL-SCNC: 93 MMOL/L — LOW (ref 96–108)
CHLORIDE SERPL-SCNC: 95 MMOL/L — LOW (ref 96–108)
CO2 SERPL-SCNC: 27 MMOL/L — SIGNIFICANT CHANGE UP (ref 22–31)
CO2 SERPL-SCNC: 27 MMOL/L — SIGNIFICANT CHANGE UP (ref 22–31)
CREAT SERPL-MCNC: 4.32 MG/DL — HIGH (ref 0.5–1.3)
CREAT SERPL-MCNC: 4.89 MG/DL — HIGH (ref 0.5–1.3)
CULTURE RESULTS: ABNORMAL
EGFR: 13 ML/MIN/1.73M2 — LOW
EGFR: 13 ML/MIN/1.73M2 — LOW
EGFR: 15 ML/MIN/1.73M2 — LOW
EGFR: 15 ML/MIN/1.73M2 — LOW
FIBRINOGEN PPP-MCNC: 254 MG/DL — SIGNIFICANT CHANGE UP (ref 200–445)
FLUAV AG NPH QL: SIGNIFICANT CHANGE UP
FLUBV AG NPH QL: SIGNIFICANT CHANGE UP
GAS PNL BLDA: SIGNIFICANT CHANGE UP
GLUCOSE SERPL-MCNC: 116 MG/DL — HIGH (ref 70–99)
GLUCOSE SERPL-MCNC: 131 MG/DL — HIGH (ref 70–99)
HCT VFR BLD CALC: 25 % — LOW (ref 39–50)
HGB BLD-MCNC: 7.8 G/DL — LOW (ref 13–17)
INR BLD: 1.21 RATIO — HIGH (ref 0.85–1.16)
LIDOCAIN IGE QN: 13 U/L — SIGNIFICANT CHANGE UP (ref 7–60)
MAGNESIUM SERPL-MCNC: 2.4 MG/DL — SIGNIFICANT CHANGE UP (ref 1.6–2.6)
MCHC RBC-ENTMCNC: 31.2 G/DL — LOW (ref 32–36)
MCHC RBC-ENTMCNC: 31.2 PG — SIGNIFICANT CHANGE UP (ref 27–34)
MCV RBC AUTO: 100 FL — SIGNIFICANT CHANGE UP (ref 80–100)
METHOD TYPE: SIGNIFICANT CHANGE UP
NRBC BLD AUTO-RTO: 0 /100 WBCS — SIGNIFICANT CHANGE UP (ref 0–0)
ORGANISM # SPEC MICROSCOPIC CNT: ABNORMAL
ORGANISM # SPEC MICROSCOPIC CNT: ABNORMAL
PHOSPHATE SERPL-MCNC: 4.4 MG/DL — SIGNIFICANT CHANGE UP (ref 2.5–4.5)
PLATELET # BLD AUTO: 274 K/UL — SIGNIFICANT CHANGE UP (ref 150–400)
POTASSIUM SERPL-MCNC: 4.4 MMOL/L — SIGNIFICANT CHANGE UP (ref 3.5–5.3)
POTASSIUM SERPL-MCNC: 4.9 MMOL/L — SIGNIFICANT CHANGE UP (ref 3.5–5.3)
POTASSIUM SERPL-SCNC: 4.9 MMOL/L — SIGNIFICANT CHANGE UP (ref 3.5–5.3)
PROT SERPL-MCNC: 6.5 G/DL — SIGNIFICANT CHANGE UP (ref 6–8.3)
PROT SERPL-MCNC: 6.8 G/DL — SIGNIFICANT CHANGE UP (ref 6–8.3)
PROTHROM AB SERPL-ACNC: 13.9 SEC — HIGH (ref 9.9–13.4)
RBC # BLD: 2.5 M/UL — LOW (ref 4.2–5.8)
RBC # FLD: 20.8 % — HIGH (ref 10.3–14.5)
RSV RNA NPH QL NAA+NON-PROBE: SIGNIFICANT CHANGE UP
SARS-COV-2 RNA SPEC QL NAA+PROBE: SIGNIFICANT CHANGE UP
SODIUM SERPL-SCNC: 132 MMOL/L — LOW (ref 135–145)
SODIUM SERPL-SCNC: 135 MMOL/L — SIGNIFICANT CHANGE UP (ref 135–145)
SPECIMEN SOURCE: SIGNIFICANT CHANGE UP
WBC # BLD: 7.96 K/UL — SIGNIFICANT CHANGE UP (ref 3.8–10.5)
WBC # FLD AUTO: 7.96 K/UL — SIGNIFICANT CHANGE UP (ref 3.8–10.5)

## 2025-03-02 PROCEDURE — 74018 RADEX ABDOMEN 1 VIEW: CPT | Mod: 26

## 2025-03-02 PROCEDURE — 71045 X-RAY EXAM CHEST 1 VIEW: CPT | Mod: 26

## 2025-03-02 PROCEDURE — 99291 CRITICAL CARE FIRST HOUR: CPT

## 2025-03-02 RX ORDER — OXYCODONE HYDROCHLORIDE 30 MG/1
10 TABLET ORAL ONCE
Refills: 0 | Status: DISCONTINUED | OUTPATIENT
Start: 2025-03-02 | End: 2025-03-02

## 2025-03-02 RX ORDER — ONDANSETRON HCL/PF 4 MG/2 ML
4 VIAL (ML) INJECTION ONCE
Refills: 0 | Status: COMPLETED | OUTPATIENT
Start: 2025-03-02 | End: 2025-03-02

## 2025-03-02 RX ADMIN — Medication 5 MILLIGRAM(S): at 22:38

## 2025-03-02 RX ADMIN — Medication 3 MILLILITER(S): at 17:51

## 2025-03-02 RX ADMIN — ATORVASTATIN CALCIUM 80 MILLIGRAM(S): 80 TABLET, FILM COATED ORAL at 22:37

## 2025-03-02 RX ADMIN — Medication 40 MILLIGRAM(S): at 12:29

## 2025-03-02 RX ADMIN — MIRTAZAPINE 7.5 MILLIGRAM(S): 30 TABLET, FILM COATED ORAL at 22:37

## 2025-03-02 RX ADMIN — Medication 5 MILLIGRAM(S): at 05:44

## 2025-03-02 RX ADMIN — Medication 3 MILLILITER(S): at 23:05

## 2025-03-02 RX ADMIN — OXYCODONE HYDROCHLORIDE 10 MILLIGRAM(S): 30 TABLET ORAL at 16:22

## 2025-03-02 RX ADMIN — HEPARIN SODIUM 13 UNIT(S)/HR: 1000 INJECTION INTRAVENOUS; SUBCUTANEOUS at 19:38

## 2025-03-02 RX ADMIN — Medication 100 MILLIGRAM(S): at 22:37

## 2025-03-02 RX ADMIN — OXYCODONE HYDROCHLORIDE 10 MILLIGRAM(S): 30 TABLET ORAL at 16:40

## 2025-03-02 RX ADMIN — METOPROLOL SUCCINATE 25 MILLIGRAM(S): 50 TABLET, EXTENDED RELEASE ORAL at 05:45

## 2025-03-02 RX ADMIN — Medication 3 MILLILITER(S): at 11:34

## 2025-03-02 RX ADMIN — Medication 1 SPRAY(S): at 05:44

## 2025-03-02 RX ADMIN — METHOCARBAMOL 500 MILLIGRAM(S): 500 TABLET, FILM COATED ORAL at 05:45

## 2025-03-02 RX ADMIN — AMIODARONE HYDROCHLORIDE 200 MILLIGRAM(S): 50 INJECTION, SOLUTION INTRAVENOUS at 05:45

## 2025-03-02 RX ADMIN — Medication 5 MILLIGRAM(S): at 13:06

## 2025-03-02 RX ADMIN — Medication 5 MILLIGRAM(S): at 22:37

## 2025-03-02 RX ADMIN — Medication 4 MILLIGRAM(S): at 09:53

## 2025-03-02 RX ADMIN — MEROPENEM 100 MILLIGRAM(S): 1 INJECTION INTRAVENOUS at 00:32

## 2025-03-02 RX ADMIN — METHOCARBAMOL 500 MILLIGRAM(S): 500 TABLET, FILM COATED ORAL at 22:36

## 2025-03-02 RX ADMIN — Medication 1 APPLICATION(S): at 22:38

## 2025-03-02 RX ADMIN — Medication 3 MILLILITER(S): at 05:49

## 2025-03-02 RX ADMIN — MEROPENEM 100 MILLIGRAM(S): 1 INJECTION INTRAVENOUS at 22:36

## 2025-03-02 NOTE — PROGRESS NOTE ADULT - ASSESSMENT
54M with PMH of HTN, HLD, ESRD on HD MWF via LUE AVF, ascites (requiring repeat paracenteses q4-6wks), NICM with moderate LV dysfunction w/ EF 35-40%() and CAD s/p atherectomy + SALLIE to D1(2024) presents to [facility] transferred from [facility].  Cardiac cath which showed pLAD 70% ISR, mLCx 70%, D1 severe dz.   Patient now transferred to [facility] CTS Dr. Rivers for CABG eval      # CAD s/p NSTEMI  Hx CAD s/p PCI LAD   Presented with ADHF elevated troponins  Positive NST1-Cath- pLAD 70% ISR, mLCx 70%, D1 severe dz.   TTE EF 35% Severe TRWas on Plavix-p2y12- 321 been off Plavix since -POD#1-C3L free LIMA-LAD; SVG-Diag; CTO-bkujPB-Hrcokowgj on  Sinus rhythm,Amio for AF prophylaxis   Atrial Flutter on TC during day Ambulating  -Increased congestion on CXR had HD yesterday remains in AFl   -Atrial Flutter HFTC 40L/30% BP stable     -Atrial Flutter tolerating TC for HD MWF-consider DOAC   OOB Abd US moderate 4 quad ascites noted remains in Atrial Flutter~~70's   TC Atrial Flutter  -s/p Bronch/BAL remains in AFl  Trach downsized 7   -Tolerating TC AFl rate controlled On Heparin gtt transition to DOAC      # Acute on Chronic Systolic Heart Failure now improved  EF-35% ,severe TR  Had HD post op  GDMT post op  LVEF improved post bypass   -TTE EF 40%,trace effusion  ECG c/w pericarditis-was on colchicine   01/15-TTE EF 55%  -TTE EF 60%   -Normal LV systolic function Moderate pericardial effusion  -On TC-HF and Vent support at nights   02/10-Vent HS with T Collar trial Ambulated Remains in AF  Repeat TTE Normal LV Systolic function Small Pericardial effusion decreased from 2/3        Vascular evaluated  AVGraft /?steal causing RV failure-'' Very low suspicion of steal Sd and AVF causing current clinical state. ''   VA Duplex Hemodialysis Access, Left (25 @ 13:35) >   Arterial flow volume of 1301 mL/m, and the venous flow volume of  1492 mL/m are consistent with a normally functioning dialysis access  fistula.      # Ascites  Hx chronic ascites  Has drainage q4-6 weeks  Last drained -4600mL  ? ascites related to severe TR  Had yjxhjlalaxke-Hlozjqj-cx growth   CT Abdomen 01/10-Large volume ascites-  US abdomen--Small to moderate volume abdominal/pelvic ascites  US abdomen  Moderate ascites  US Abdomen -Moderate 4 quad ascites    # ESRD  Now on  HD-MWF

## 2025-03-02 NOTE — PROGRESS NOTE ADULT - SUBJECTIVE AND OBJECTIVE BOX
Patient seen and examined at the bedside.    Remained critically ill on continuous ICU monitoring.    OBJECTIVE:  Vital Signs Last 24 Hrs  T(C): 36.3 (02 Mar 2025 08:00), Max: 37.4 (01 Mar 2025 20:00)  T(F): 97.3 (02 Mar 2025 08:00), Max: 99.3 (01 Mar 2025 20:00)  HR: 91 (02 Mar 2025 11:28) (65 - 121)  BP: 133/57 (02 Mar 2025 11:00) (104/46 - 179/79)  BP(mean): 75 (02 Mar 2025 11:00) (57 - 101)  RR: 13 (02 Mar 2025 11:00) (10 - 21)  SpO2: 100% (02 Mar 2025 11:28) (93% - 100%)    Parameters below as of 02 Mar 2025 11:28  Patient On (Oxygen Delivery Method): tracheostomy collar    ============================I/O===========================   I&O's Detail    01 Mar 2025 07:01  -  02 Mar 2025 07:00  --------------------------------------------------------  IN:    Heparin: 336.5 mL    IV PiggyBack: 50 mL    sodium chloride 0.9%: 240 mL    Vital1.5: 210 mL  Total IN: 836.5 mL    OUT:    Stool (mL): 1 mL  Total OUT: 1 mL    Total NET: 835.5 mL      02 Mar 2025 07:01  -  02 Mar 2025 12:19  --------------------------------------------------------  IN:    Heparin: 56 mL    sodium chloride 0.9%: 40 mL    Vital1.5: 10 mL  Total IN: 106 mL    OUT:    Stool (mL): 2 mL  Total OUT: 2 mL    Total NET: 104 mL    ============================ LABS =========================                        7.8    7.96  )-----------( 274      ( 02 Mar 2025 00:43 )             25.0     03-02    135  |  95[L]  |  28[H]  ----------------------------<  116[H]  4.4   |  27  |  4.32[H]    Ca    9.0      02 Mar 2025 00:42  Phos  4.4     03-02  Mg     2.4     03-02    TPro  6.5  /  Alb  2.8[L]  /  TBili  0.4  /  DBili  x   /  AST  20  /  ALT  10  /  AlkPhos  87  03-02    LIVER FUNCTIONS - ( 02 Mar 2025 00:42 )  Alb: 2.8 g/dL / Pro: 6.5 g/dL / ALK PHOS: 87 U/L / ALT: 10 U/L / AST: 20 U/L / GGT: x           PT/INR - ( 02 Mar 2025 00:43 )   PT: 13.9 sec;   INR: 1.21 ratio         PTT - ( 02 Mar 2025 04:38 )  PTT:74.5 sec    Urinalysis Basic - ( 02 Mar 2025 00:42 )    Color: x / Appearance: x / SG: x / pH: x  Gluc: 116 mg/dL / Ketone: x  / Bili: x / Urobili: x   Blood: x / Protein: x / Nitrite: x   Leuk Esterase: x / RBC: x / WBC x   Sq Epi: x / Non Sq Epi: x / Bacteria: x      Physical Exam:  General: NAD, resting in the bed   Neurology: nonfocal, ambulates   Eyes: bilateral pupils equal and reactive   ENT/Neck: Neck supple, trachea midline, No JVD, + trach site--cdi    Respiratory: diminished in LLL    CV: S1S2, no murmurs         [x] Afib, rate controlled    Abdominal: Soft, NT, ND +BS, + vomiting    Extremities: No pedal edema noted, + peripheral pulses   Skin: wound vac to sacrum

## 2025-03-02 NOTE — PROGRESS NOTE ADULT - SUBJECTIVE AND OBJECTIVE BOX
[facility]    Dr. Nica Styles     Office [phone] (9 am to 5 pm)  Service: [phone] (5pm to 9am)  Also Available on TEAMS      RENAL PROGRESS NOTE: DATE OF SERVICE 03-02-25 @ 08:32    Patient is a 54y old  Male who presents with a chief complaint of CAD s/p CABG (01 Mar 2025 07:46)      Patient seen and examined at bedside. No chest pain/sob    VITALS:  T(F): 98.2 (03-02-25 @ 04:00), Max: 99.3 (03-01-25 @ 20:00)  HR: 76 (03-02-25 @ 08:00)  BP: 134/57 (03-02-25 @ 08:00)  RR: 14 (03-02-25 @ 08:00)  SpO2: 100% (03-02-25 @ 08:00)  Wt(kg): --    03-01 @ 07:01  -  03-02 @ 07:00  --------------------------------------------------------  IN: 836.5 mL / OUT: 1 mL / NET: 835.5 mL          PHYSICAL EXAM:  Constitutional: NAD  Neck: No JVD  Respiratory: CTAB, no wheezes, rales or rhonchi  Cardiovascular: S1, S2, RRR  Gastrointestinal: BS+, soft, NT/ND  Extremities: No peripheral edema    Hospital Medications:   MEDICATIONS  (STANDING):  aMIOdarone    Tablet 200 milliGRAM(s) Oral daily  ascorbic acid 500 milliGRAM(s) Oral daily  aspirin  chewable 81 milliGRAM(s) Oral daily  atorvastatin 80 milliGRAM(s) Oral at bedtime  chlorhexidine 2% Cloths 1 Application(s) Topical daily  chlorhexidine 4% Liquid 1 Application(s) Topical <User Schedule>  dextrose 50% Injectable 50 milliLiter(s) IV Push every 15 minutes  epoetin ignacia (EPOGEN) Injectable 87640 Unit(s) IV Push <User Schedule>  ferrous    sulfate Liquid 300 milliGRAM(s) Enteral Tube daily  heparin  Infusion 1300 Unit(s)/Hr (14 mL/Hr) IV Continuous <Continuous>  insulin lispro (ADMELOG) corrective regimen sliding scale   SubCutaneous every 6 hours  melatonin 5 milliGRAM(s) Oral at bedtime  meropenem  IVPB 500 milliGRAM(s) IV Intermittent every 24 hours  methocarbamol 500 milliGRAM(s) Oral every 8 hours  metoclopramide Injectable 5 milliGRAM(s) IV Push every 8 hours  metoprolol tartrate 25 milliGRAM(s) Oral two times a day  mirtazapine 7.5 milliGRAM(s) Oral at bedtime  Nephro-elicia 1 Tablet(s) Oral daily  pantoprazole  Injectable 40 milliGRAM(s) IV Push daily  sodium chloride 0.65% Nasal 1 Spray(s) Both Nostrils every 12 hours  sodium chloride 0.9% for Nebulization 3 milliLiter(s) Nebulizer every 6 hours  sodium chloride 0.9%. 1000 milliLiter(s) (10 mL/Hr) IV Continuous <Continuous>  sodium zirconium cyclosilicate 10 Gram(s) Oral <User Schedule>  traZODone 100 milliGRAM(s) Oral at bedtime      LABS:  03-02    135  |  95[L]  |  28[H]  ----------------------------<  116[H]  4.4   |  27  |  4.32[H]    Ca    9.0      02 Mar 2025 00:42  Phos  4.4     03-02  Mg     2.4     03-02    TPro  6.5  /  Alb  2.8[L]  /  TBili  0.4  /  DBili      /  AST  20  /  ALT  10  /  AlkPhos  87  03-02    Creatinine Trend: 4.32 <--, 3.15 <--, 4.51 <--, 3.45 <--, 5.17 <--, 4.17 <--, 5.72 <--    Albumin: 2.8 g/dL (03-02 @ 00:42)  Phosphorus: 4.4 mg/dL (03-02 @ 00:42)                              7.8    7.96  )-----------( 274      ( 02 Mar 2025 00:43 )             25.0     Urine Studies:  Urinalysis - [03-02-25 @ 00:42]      Color  / Appearance  / SG  / pH       Gluc 116 / Ketone   / Bili  / Urobili        Blood  / Protein  / Leuk Est  / Nitrite       RBC  / WBC  / Hyaline  / Gran  / Sq Epi  / Non Sq Epi  / Bacteria       Iron 29, TIBC 143, %sat 20      [12-19-24 @ 05:00]  Ferritin 904      [12-19-24 @ 05:00]  TSH 4.75      [12-24-24 @ 06:50]        RADIOLOGY & ADDITIONAL STUDIES:

## 2025-03-02 NOTE — SPEECH LANGUAGE PATHOLOGY EVALUATION - SLP GENERAL OBSERVATIONS
Encountered Pt asleep in bed on 40% FiO2 via TC; easily aroused to verbal stimuli. #7 Portex cuffless trach appreciated. Scant clear secretions. Pt able to voice over trach, however, noted to be hypophonic. Utilizes white board at bedside to communicate as needed. Pt is A&Ox3 and able to follow simple commands. Pt endorsing nausea.

## 2025-03-02 NOTE — PROGRESS NOTE ADULT - ASSESSMENT
54M with PMH of HTN, HLD, ESRD on HD MWF via LUE AVF, ascites (requiring repeat paracenteses q4-6wks), NICM with moderate LV dysfunction w/ EF 35-40%() and CAD s/p atherectomy + SALLIE to D1(2024) presents to [facility] transferred from [facility].  Cardiac cath which showed pLAD 70% ISR, mLCx 70%, D1 severe dz.   Patient now transferred to [facility] CTS Dr. Rivers for CABG eval      # CAD s/p NSTEMI  Hx CAD s/p PCI LAD   Presented with ADHF elevated troponins  Positive NST1-Cath- pLAD 70% ISR, mLCx 70%, D1 severe dz.   TTE EF 35% Severe TRWas on Plavix-p2y12- 321 been off Plavix since -POD#1-C3L free LIMA-LAD; SVG-Diag; KBJ-ncjgRA-Vqnovrftb on  Sinus rhythm,Amio for AF prophylaxis   Atrial Flutter on TC during day Ambulating  -Increased congestion on CXR had HD yesterday remains in AFl   -Atrial Flutter HFTC 40L/30% BP stable     -Atrial Flutter tolerating TC for HD MWF-consider DOAC   OOB Abd US moderate 4 quad ascites noted remains in Atrial Flutter~~70's   TC Atrial Flutter  -s/p Bronch/BAL remains in AFl  Trach downsized 7   -Tolerating TC AFl rate controlled On Heparin gtt transition to DOAC      # Acute on Chronic Systolic Heart Failure now improved  EF-35% ,severe TR  Had HD post op  GDMT post op  LVEF improved post bypass   -TTE EF 40%,trace effusion  ECG c/w pericarditis-was on colchicine   01/15-TTE EF 55%  -TTE EF 60%   -Normal LV systolic function Moderate pericardial effusion  -On TC-HF and Vent support at nights   02/10-Vent HS with T Collar trial Ambulated Remains in AF  Repeat TTE Normal LV Systolic function Small Pericardial effusion decreased from 2/3        Vascular evaluated  AVGraft /?steal causing RV failure-'' Very low suspicion of steal Sd and AVF causing current clinical state. ''   VA Duplex Hemodialysis Access, Left (25 @ 13:35) >   Arterial flow volume of 1301 mL/m, and the venous flow volume of  1492 mL/m are consistent with a normally functioning dialysis access  fistula.      # Ascites  Hx chronic ascites  Has drainage q4-6 weeks  Last drained -4600mL  ? ascites related to severe TR  Had bngkiiaqylyl-Ynrjxan-xd growth   CT Abdomen 01/10-Large volume ascites-  US abdomen--Small to moderate volume abdominal/pelvic ascites  US abdomen  Moderate ascites  US Abdomen -Moderate 4 quad ascites    # ESRD  HD as scheduled

## 2025-03-02 NOTE — SPEECH LANGUAGE PATHOLOGY EVALUATION - SLP PERTINENT HISTORY OF CURRENT PROBLEM
54y M with PMHx of HTN, HLD, ESRD on HD MWF via LUE AVF, ascites (requiring repeat paracenteses q4-6wks), NICM with moderate LV dysfunction w/ EF 35-40%(2023) and CAD s/p atherectomy + SALLIE to D1(4/11/2024) presents to [facility] transferred from [facility]. Patient initially presented to [facility] ED with SOB. Of note Pt recently admitted from 09/27-12/02 for acute cholecystitis s/p cholecystectomy. In [facility] ED, pt was hypoxic to 86% on RA, trop 1.7K, EKG no new changes, CXR fluid overload. Admitted for AHRF 2/2 fluid overload. Pt received HD. DAPT was resumed, TTE showed improvement in LVEF to 40-45%. Stress test was abnormal with 1mm inferior STD, reversible ischemia in the inferior wall and fixed defects in the lateral, anterior and apical walls with post stress EF of 24%. Pt was then transferred to [facility] for cardiac cath which showed pLAD 70% ISR, mLCx 70%, D1 severe dz. Transferred to [facility] 12/24 for CABG w/ CTS Dr. Rivers.

## 2025-03-02 NOTE — SWALLOW BEDSIDE ASSESSMENT ADULT - COMMENTS
+ESRD on HD. Multi-CAD s/p C3L on 12/30/24. Extubated (12/31) after x1 day. Post-op, patient developed worsening cardiogenic shock requiring inotropic support and an IABP; reintubated 1/2-1/8. Extubated to HFNC. 1/11: Findings: thick mucoid secretion in Left bronchus, poor cough effort; ...Airway evaluation revealed Sharp Abena. AUGUSTO and LLL evaluation revealed inflamed airway, thick mucoid secretion. RUL, RML, RLL revealed minimal secretion. Post xray shows improved aeration of Left lung. Bedside swallow evaluation and FEES performed. 1/15 Pt w/ respiratory distress and cardiac arrest requiring intubation. S/p trach 1/16. No PMV evaluation/placement per MD. No plans for swallow evaluation, SLP signed off 1/31. +New abscess in right buttocks and coccygeal area, s/p I&D of back abscess and sacral decub 2/1. Wound vac in place. L chest tube placed for pleural effusion. ID following throughout hospital course for multiple infections. EP following for intermittent bradycardia w/ hypotension, dobutabmine re-initiated. CXR w/ L lung collapse s/p bronch 2/4. Neuro consulted for L foot drop and notes "Presentation appears to peripheral in etiology given the involvement of multiple nerves." CTSx consulted due to concern for diaphragmatic paralysis, sniff test yesterday showed paradoxical diaphragmatic motion. +L hemidiaphragm dysfunction. S/p paracentesis 2/11. 2/13 s/p Left robotic VATS Diapharagm plication. S/p trach exchange 2/27. 3/1 Pt nauseous and vomiting    ***Seen for initial bedside swallow evaluation 1/11/25 with c/f pharyngeal dysphagia and mild dysphonia. NPO, with non-oral nutrition/hydration/medications. FEES 1/13 notable for "pooling and penetration of frothy secretions are noted. Secretions are not cleared with use of ice chips. There is reduced airway closure and reduced supra/subglottic sensation contributing to laryngeal penetration and aspiration of purees, thickened liquids and ice chips." Reduced movement of the left vocal cord is noted.

## 2025-03-02 NOTE — PROGRESS NOTE ADULT - ACP WITH SCRIBE
I personally performed the service described in the documentation  recorded by the scribe in my presence, and it accurately and completely records my words and actions.

## 2025-03-02 NOTE — SPEECH LANGUAGE PATHOLOGY EVALUATION - COMMENTS
+ESRD on HD. Multi-CAD s/p C3L on 12/30/24. Extubated (12/31) after x1 day. Post-op, patient developed worsening cardiogenic shock requiring inotropic support and an IABP; reintubated 1/2-1/8. Extubated to HFNC. 1/11: Findings: thick mucoid secretion in Left bronchus, poor cough effort; ...Airway evaluation revealed Sharp Abena. AUGUSTO and LLL evaluation revealed inflamed airway, thick mucoid secretion. RUL, RML, RLL revealed minimal secretion. Post xray shows improved aeration of Left lung. Bedside swallow evaluation and FEES performed. 1/15 Pt w/ respiratory distress and cardiac arrest requiring intubation. S/p trach 1/16. No PMV evaluation/placement per MD. No plans for swallow evaluation, SLP signed off 1/31. +New abscess in right buttocks and coccygeal area, s/p I&D of back abscess and sacral decub 2/1. Wound vac in place. L chest tube placed for pleural effusion. ID following throughout hospital course for multiple infections. EP following for intermittent bradycardia w/ hypotension, dobutabmine re-initiated. CXR w/ L lung collapse s/p bronch 2/4. Neuro consulted for L foot drop and notes "Presentation appears to peripheral in etiology given the involvement of multiple nerves." CTSx consulted due to concern for diaphragmatic paralysis, sniff test yesterday showed paradoxical diaphragmatic motion. +L hemidiaphragm dysfunction. S/p paracentesis 2/11. 2/13 s/p Left robotic VATS Diapharagm plication. S/p trach exchange 2/27. 3/1 Pt nauseous and vomiting    ***Seen for initial bedside swallow evaluation 1/11/25 with c/f pharyngeal dysphagia and mild dysphonia. NPO, with non-oral nutrition/hydration/medications. FEES 1/13 notable for "pooling and penetration of frothy secretions are noted. Secretions are not cleared with use of ice chips. There is reduced airway closure and reduced supra/subglottic sensation contributing to laryngeal penetration and aspiration of purees, thickened liquids and ice chips." Reduced movement of the left vocal cord is noted. WNL when utilizing communication board SLP d/w Pt, covering MARIA ELENA Lowe, MD [place], and NP [place] Hypophonia likely iso trach Pt able to speak over trach, however, noted to be hypophonic likely due to air escape iso trach. Speaks in short phrases and utilizes white board as needed to communicate more complex thoughts/ideas. +white board

## 2025-03-02 NOTE — SPEECH LANGUAGE PATHOLOGY EVALUATION - SLP DIAGNOSIS
54y M w/ complicated hospital course that includes s/p C3L on 12/30, multiple intubations x3, cardiogenic shock requiring IABP, s/p trach 1/16, cardiac arrest, back/sacral abscess s/p debridement, multiple infections, and L hemidiaphragm dysfunction 2/13 s/p L robotic VATS diapharagm plication. Pt presents w/ functional expressive communication skills with use of verbal communication (voicing over trach) and written communication via white board. +hypophonia likely iso trach. Receptive functional communication skills are WNL. Pt is able to follow commands and participate in conversations pertaining to his daily care/plan of care. Pt has no skilled ST needs for functional communication at this time; this service will continue to follow Pt for dysphagia management.

## 2025-03-02 NOTE — PROGRESS NOTE ADULT - ASSESSMENT
54M with PMH of HTN, HLD, ESRD on HD MWF via LUE AVF, ascites (requiring repeat paracenteses q4-6wks), NICM with moderate LV dysfunction w/ EF 35-40%(2023) and CAD s/p atherectomy + SALLIE to D1(4/11/2024) presents to [facility] transferred from [facility] for CABG eval  Nephro on board for HD needs.    ESRD on HD   Nephrologist Dr. Bailey  [facility] Schedule / Access:  LUE AVF  Center: [facility]  Pt s/p HD on 2/19 and 2/21.  s/p HD 2/24 uf 1.5L  S/p HD on 02/26 2 L UF and 02/28 2 L UF again   HD tomm per cristin  Keep MAP>65  Renal Diet  HD consent in chart     Hyper/Hypokalemia  Pt intermittently Hyperkalemia  HD as scheduled  now improved  Low K diet.  C/W Lokelma 10gm on nonHD days.  Monitor K.    HTN  BP fluctuating; controlled.  UF w/ HD as BP permits.  Low sodium diet.  Management per ICU  Monitor BP     CKD MBD  Check PTH   Low PO4 diet.  Not on binder.  Monitor Ca , PO4 daily     Anemia  CRISTIANE with HD  Repeat iron studies.  Transfuse if hgb <7    CAD with multivessel disease  CTICU following  s/p CABG 12/30    Hypoxic respiratory failure requiring Trach  Per surgery  S/p bronchoscopy, pulm following

## 2025-03-02 NOTE — PROGRESS NOTE ADULT - SUBJECTIVE AND OBJECTIVE BOX
MR#[id number]  PATIENT NAME:RAAD CANO    DATE OF SERVICE: 03-02-25 @ 09:05  Patient was seen and examined by Solo Quick MD on    03-02-25 @ 09:05 .  Interim events noted.Consultant notes ,Labs,Telemetry reviewed by me       HOSPITAL COURSE: HPI:  54M with PMH of HTN, HLD, ESRD on HD MWF via LUE AVF, ascites (requiring repeat paracenteses q4-6wks), NICM with moderate LV dysfunction w/ EF 35-40%(2023) and CAD s/p atherectomy + SALLIE to D1(4/11/2024) presents to [facility] transferred from [facility]. Patient initially presented to [facility] ED with shortness of breath. Of note he was recently admitted from 09/27-12/02 for acute cholecystitis s/p cholecystectomy. In [facility] ED, pt was hypoxic to 86% on RA, trop 1.7K, EKG no new changes, CXR fluid overload. Admitted for AHRF 2/2 fluid overload. Pt received HD on 12/18, 12/19, 12/20 and 12/22. DAPT was resumed, TTE showed improvement in LVEF to 40-45%. Stress test was abnormal with 1mm inferior STD, reversible ischemia in the inferior wall and fixed defects in the lateral, anterior and apical walls with post stress EF of 24%. Pt was then transferred to [facility] for cardiac cath which showed pLAD 70% ISR, mLCx 70%, D1 severe dz. Patient now transferred to [facility] CTS Dr. Rivers for CABG eval. Patient denies any current SOB or chest pain on admission. Otherwise denies headaches, abdominal pain, urinary or bowel changes, fevers, chills, N/V/D, sick contacts.  (24 Dec 2024 05:07)      INTERIM EVENTS:Patient seen at bedside ,interim events noted.  12/23 Cardiac cath  pLAD 70% ISR, mLCx 70%, D1 severe dz.   12/31-POD#1-C3L free LIMA-LAD; SVG-Diag; SVG-leftPL Awake OOB extubated Sinus rhythm on Dobutamine gtt  02/19 Had Bronch yesterday on vent HS remains in Atrial Flutter  02/21-Awake on HFTC at night Ambulating-Atrial Flutter  02/22-Had Midline Remains in Atrial Flutter on TC  02/23-Lying in bed still on NGT feeds on TC-HFTC-40L/30% Atrial Flutter  02/24-Awake Tolerating TC Atrial Flutter~~'s  For HD today  02/26-OOB on TC not dyspneac remains in AFl on Tube feeds  02/27-Had HD remains in AFl on TC  02/28 Awake had Bronch Trach downsized to 7 AFl    03/01-Awake Atrial Flutter no dyspnea TC tolerating  03/02-Awake vomitting subsided      MEDICATIONS  (STANDING):  aMIOdarone    Tablet 200 milliGRAM(s) Oral daily  ascorbic acid 500 milliGRAM(s) Oral daily  aspirin  chewable 81 milliGRAM(s) Oral daily  atorvastatin 80 milliGRAM(s) Oral at bedtime  chlorhexidine 2% Cloths 1 Application(s) Topical daily  chlorhexidine 4% Liquid 1 Application(s) Topical <User Schedule>  dextrose 50% Injectable 50 milliLiter(s) IV Push every 15 minutes  epoetin ignacia (EPOGEN) Injectable 07863 Unit(s) IV Push <User Schedule>  ferrous    sulfate Liquid 300 milliGRAM(s) Enteral Tube daily  heparin  Infusion 1300 Unit(s)/Hr (14 mL/Hr) IV Continuous <Continuous>  insulin lispro (ADMELOG) corrective regimen sliding scale   SubCutaneous every 6 hours  melatonin 5 milliGRAM(s) Oral at bedtime  meropenem  IVPB 500 milliGRAM(s) IV Intermittent every 24 hours  methocarbamol 500 milliGRAM(s) Oral every 8 hours  metoclopramide Injectable 5 milliGRAM(s) IV Push every 8 hours  metoprolol tartrate 25 milliGRAM(s) Oral two times a day  mirtazapine 7.5 milliGRAM(s) Oral at bedtime  Nephro-elicia 1 Tablet(s) Oral daily  pantoprazole  Injectable 40 milliGRAM(s) IV Push daily  sodium chloride 0.65% Nasal 1 Spray(s) Both Nostrils every 12 hours  sodium chloride 0.9% for Nebulization 3 milliLiter(s) Nebulizer every 6 hours  sodium chloride 0.9%. 1000 milliLiter(s) (10 mL/Hr) IV Continuous <Continuous>  sodium zirconium cyclosilicate 10 Gram(s) Oral <User Schedule>  traZODone 100 milliGRAM(s) Oral at bedtime    MEDICATIONS  (PRN):  acetaminophen     Tablet .. 650 milliGRAM(s) Oral every 6 hours PRN Mild Pain (1 - 3)  lidocaine/prilocaine Cream 1 Application(s) Topical daily PRN pre-HD  sodium chloride 0.9% lock flush 10 milliLiter(s) IV Push every 1 hour PRN Pre/post blood products, medications, blood draw, and to maintain line patency            REVIEW OF SYSTEMS:  Constitutional: [ ] fever, [ ]weight loss,  [ ]fatigue [ ]weight gain  Eyes: [ ] visual changes  Respiratory: [ ]shortness of breath;  [ ] cough, [ ]wheezing, [ ]chills, [ ]hemoptysis  Cardiovascular: [ ] chest pain, [ ]palpitations, [ ]dizziness,  [ ]leg swelling[ ]orthopnea[ ]PND  Gastrointestinal: [ ] abdominal pain, [ ]nausea, [ ]vomiting,  [ ]diarrhea [ ]Constipation [ ]Melena  Genitourinary: [ ] dysuria, [ ] hematuria [ ]Vigil  Neurologic: [ ] headaches [ ] tremors[ ]weakness [ ]Paralysis Right[ ] Left[ ]  Skin: [ ] itching, [ ]burning, [ ] rashes  Endocrine: [ ] heat or cold intolerance  Musculoskeletal: [ ] joint pain or swelling; [ ] muscle, back, or extremity pain  Psychiatric: [ ] depression, [ ]anxiety, [ ]mood swings, or [ ]difficulty sleeping  Hematologic: [ ] easy bruising, [ ] bleeding gums    [ ] All remaining systems negative except as per above.   [ ]Unable to obtain.  [x] No change in ROS since admission      Vital Signs Last 24 Hrs  T(C): 36.8 (02 Mar 2025 04:00), Max: 37.4 (01 Mar 2025 20:00)  T(F): 98.2 (02 Mar 2025 04:00), Max: 99.3 (01 Mar 2025 20:00)  HR: 76 (02 Mar 2025 08:00) (65 - 121)  BP: 134/57 (02 Mar 2025 08:00) (95/43 - 179/79)  BP(mean): 82 (02 Mar 2025 08:00) (57 - 101)  RR: 14 (02 Mar 2025 08:00) (10 - 23)  SpO2: 100% (02 Mar 2025 08:00) (93% - 100%)    Parameters below as of 02 Mar 2025 08:00  Patient On (Oxygen Delivery Method): tracheostomy collar    O2 Concentration (%): 40  I&O's Summary    01 Mar 2025 07:01  -  02 Mar 2025 07:00  --------------------------------------------------------  IN: 836.5 mL / OUT: 1 mL / NET: 835.5 mL    02 Mar 2025 07:01  -  02 Mar 2025 09:05  --------------------------------------------------------  IN: 34 mL / OUT: 1 mL / NET: 33 mL        PHYSICAL EXAM:  General: No acute distress BMI-24  HEENT: EOMI, PERRL  Neck: Supple, [ ] JVD  Lungs: Equal air entry bilaterally; [ ] rales [ ] wheezing [ ] rhonchi  Heart: Regular rate and rhythm; [x ] murmur   2/6 [ x] systolic [ ] diastolic [ ] radiation[ ] rubs [ ]  gallops  Abdomen: Nontender, bowel sounds present  Extremities: No clubbing, cyanosis, [ ] edema [ ]Pulses  equal and intact  Nervous system:  Alert & Oriented X3, no focal deficits  Psychiatric: Normal affect  Skin: No rashes or lesions    LABS:  03-02    135  |  95[L]  |  28[H]  ----------------------------<  116[H]  4.4   |  27  |  4.32[H]    Ca    9.0      02 Mar 2025 00:42  Phos  4.4     03-02  Mg     2.4     03-02    TPro  6.5  /  Alb  2.8[L]  /  TBili  0.4  /  DBili  x   /  AST  20  /  ALT  10  /  AlkPhos  87  03-02    Creatinine Trend: 4.32<--, 3.15<--, 4.51<--, 3.45<--, 5.17<--, 4.17<--                        7.8    7.96  )-----------( 274      ( 02 Mar 2025 00:43 )             25.0     PT/INR - ( 02 Mar 2025 00:43 )   PT: 13.9 sec;   INR: 1.21 ratio         PTT - ( 02 Mar 2025 04:38 )  PTT:74.5 sec         Xray Chest 1 View- PORTABLE-Urgent (Xray Chest 1 View- PORTABLE-Urgent .) (02.27.25 @ 07:35) >  COMPARISON: Chest x-ray 2/26/2023).    FINDINGS:  2/26/2025 6:05 AM:  Patient is status post sternotomy. Enteric tube courses over the   diaphragm with tip overlying the stomach.  Midline sternotomy wires.  The heart is unchanged in size. There are perihilar interstitial   opacities. Small left pleural effusion. There is no pneumothorax.    02/27/2025 6:33 AM:.  Worsening opacification the left hemithorax.  There is no pneumothorax.    IMPRESSION:  Worsening opacification of the left hemithorax with minimal residual  aeration of the left upper lobe.      US Abdomen Limited (02.25.25 @ 15:56) >  IMPRESSION:  Moderate volume ascites seen in all 4 quadrants. Deepest pocket at the  right lower quadrant.    Right pleural effusion.     TTE Limited W or WO Ultrasound Enhancing Agent (02.18.25 @ 13:54) >  CONCLUSIONS:      1. Left ventricular systolic function is normal.   2. Enlarged right ventricular cavity size and mildly reduced right ventricular systolic function.   3. Left pleural effusion noted.   4. Trace pericardial effusion.

## 2025-03-02 NOTE — SPEECH LANGUAGE PATHOLOGY EVALUATION - SPECIFY REASON(S)
to assess speech/language/cognitive skills and r/o deficits to assess functional communication skills iso trach per team; no PMV evaluation/placement per MD

## 2025-03-02 NOTE — PROGRESS NOTE ADULT - SUBJECTIVE AND OBJECTIVE BOX
Patient seen and examined at the bedside.    Remained critically ill on continuous ICU monitoring.    OBJECTIVE:  Vital Signs Last 24 Hrs  T(C): 36.3 (02 Mar 2025 08:00), Max: 37.4 (01 Mar 2025 20:00)  T(F): 97.3 (02 Mar 2025 08:00), Max: 99.3 (01 Mar 2025 20:00)  HR: 91 (02 Mar 2025 11:28) (65 - 121)  BP: 133/57 (02 Mar 2025 11:00) (104/46 - 179/79)  BP(mean): 75 (02 Mar 2025 11:00) (57 - 101)  RR: 13 (02 Mar 2025 11:00) (10 - 21)  SpO2: 100% (02 Mar 2025 11:28) (93% - 100%)    Parameters below as of 02 Mar 2025 11:28  Patient On (Oxygen Delivery Method): tracheostomy collar    ============================I/O===========================   I&O's Detail    01 Mar 2025 07:01  -  02 Mar 2025 07:00  --------------------------------------------------------  IN:    Heparin: 336.5 mL    IV PiggyBack: 50 mL    sodium chloride 0.9%: 240 mL    Vital1.5: 210 mL  Total IN: 836.5 mL    OUT:    Stool (mL): 1 mL  Total OUT: 1 mL    Total NET: 835.5 mL      02 Mar 2025 07:01  -  02 Mar 2025 12:19  --------------------------------------------------------  IN:    Heparin: 56 mL    sodium chloride 0.9%: 40 mL    Vital1.5: 10 mL  Total IN: 106 mL    OUT:    Stool (mL): 2 mL  Total OUT: 2 mL    Total NET: 104 mL    ============================ LABS =========================                        7.8    7.96  )-----------( 274      ( 02 Mar 2025 00:43 )             25.0     03-02    135  |  95[L]  |  28[H]  ----------------------------<  116[H]  4.4   |  27  |  4.32[H]    Ca    9.0      02 Mar 2025 00:42  Phos  4.4     03-02  Mg     2.4     03-02    TPro  6.5  /  Alb  2.8[L]  /  TBili  0.4  /  DBili  x   /  AST  20  /  ALT  10  /  AlkPhos  87  03-02    LIVER FUNCTIONS - ( 02 Mar 2025 00:42 )  Alb: 2.8 g/dL / Pro: 6.5 g/dL / ALK PHOS: 87 U/L / ALT: 10 U/L / AST: 20 U/L / GGT: x           PT/INR - ( 02 Mar 2025 00:43 )   PT: 13.9 sec;   INR: 1.21 ratio         PTT - ( 02 Mar 2025 04:38 )  PTT:74.5 sec    Urinalysis Basic - ( 02 Mar 2025 00:42 )    Color: x / Appearance: x / SG: x / pH: x  Gluc: 116 mg/dL / Ketone: x  / Bili: x / Urobili: x   Blood: x / Protein: x / Nitrite: x   Leuk Esterase: x / RBC: x / WBC x   Sq Epi: x / Non Sq Epi: x / Bacteria: x      Physical Exam:  General: NAD, resting in the bed   Neurology: nonfocal, ambulates   Eyes: bilateral pupils equal and reactive   ENT/Neck: Neck supple, trachea midline, No JVD, + trach site--cdi    Respiratory: diminished in LLL    CV: S1S2, no murmurs         [x] Afib, rate controlled    Abdominal: Soft, NT, ND +BS, + vomiting    Extremities: No pedal edema noted, + peripheral pulses   Skin: wound vac to sacrum                         Assessment:  55 yo M s/p CABG x 3 on 12/30/24    Postop acute respiratory failure  Acute blood loss anemia  ESRD on iHD   hx of ESBL   hx of VRE  Serratia infection  MSRA infection   sacral decub     Plan:   ***Neuro***  [x] Nonfocal   [x] Pain management with PRNs  Melatonin, Trazadone, Remeron nightly  Robaxin  Post operative neuro assessment     ***Cardiovascular***  Labile hemodynamics large volume resuscitation dual pressor + inotrope  Invasive hemodynamic monitoring, assess perfusion indices   AF / Hct 25.0% / Lactate 1.0  [x] Lopressor 25 mg BID  [x] Amiodarone for a fib  [x] Heparin 1300 units/hr, PTT 50-60  [x] ASA [x] Statin    [x] K> 4 Mg >2   Serial EKG and cardiac enzymes     ***Pulmonary***  [x] Tracheostomy collar   Follow SpO2, CXR, blood gasses  CXR with continued Left sided collapse    ***GI***  [x] NPO with Vital TF at goal rate of 20 ml/hr  [x] Protonix     Reglan for gut motility    ***Renal***  Continue to monitor I/Os, BUN/Creatinine.   Replete lytes PRN  Next HD session planned for Monday   Nephro-elicia for renal support    ***ID***  Meropenem for pneumonia    ***Endocrine***  [x] DM2: HbA1c 5.6%             - [x] ISS             - Need tight glycemic control to prevent wound infection.    Patient requires continuous monitoring with bedside rhythm monitoring, pulse oximetry monitoring, and continuous central venous and arterial pressure monitoring; and intermittent blood gas analysis. Care plan discussed with the ICU care team.   Patient remained critical, at risk for life threatening decompensation.    I have spent 30 minutes providing critical care management to this patient.    By signing my name below, I, Gillian Taylor, attest that this documentation has been prepared under the direction and in the presence of Shar Park NP  Electronically signed: Autumn Dickens, 03-02-25 @ 12:19    I, Shar Park NP, personally performed the services described in this documentation. all medical record entries made by the autumn were at my direction and in my presence. I have reviewed the chart and agree that the record reflects my personal performance and is accurate and complete  Electronically signed: Shar Park NP   Patient seen and examined at the bedside.    Remained critically ill on continuous ICU monitoring.    OBJECTIVE:  Vital Signs Last 24 Hrs  T(C): 36.3 (02 Mar 2025 08:00), Max: 37.4 (01 Mar 2025 20:00)  T(F): 97.3 (02 Mar 2025 08:00), Max: 99.3 (01 Mar 2025 20:00)  HR: 91 (02 Mar 2025 11:28) (65 - 121)  BP: 133/57 (02 Mar 2025 11:00) (104/46 - 179/79)  BP(mean): 75 (02 Mar 2025 11:00) (57 - 101)  RR: 13 (02 Mar 2025 11:00) (10 - 21)  SpO2: 100% (02 Mar 2025 11:28) (93% - 100%)    Parameters below as of 02 Mar 2025 11:28  Patient On (Oxygen Delivery Method): tracheostomy collar    ============================I/O===========================   I&O's Detail    01 Mar 2025 07:01  -  02 Mar 2025 07:00  --------------------------------------------------------  IN:    Heparin: 336.5 mL    IV PiggyBack: 50 mL    sodium chloride 0.9%: 240 mL    Vital1.5: 210 mL  Total IN: 836.5 mL    OUT:    Stool (mL): 1 mL  Total OUT: 1 mL    Total NET: 835.5 mL      02 Mar 2025 07:01  -  02 Mar 2025 12:19  --------------------------------------------------------  IN:    Heparin: 56 mL    sodium chloride 0.9%: 40 mL    Vital1.5: 10 mL  Total IN: 106 mL    OUT:    Stool (mL): 2 mL  Total OUT: 2 mL    Total NET: 104 mL    ============================ LABS =========================                        7.8    7.96  )-----------( 274      ( 02 Mar 2025 00:43 )             25.0     03-02    135  |  95[L]  |  28[H]  ----------------------------<  116[H]  4.4   |  27  |  4.32[H]    Ca    9.0      02 Mar 2025 00:42  Phos  4.4     03-02  Mg     2.4     03-02    TPro  6.5  /  Alb  2.8[L]  /  TBili  0.4  /  DBili  x   /  AST  20  /  ALT  10  /  AlkPhos  87  03-02    LIVER FUNCTIONS - ( 02 Mar 2025 00:42 )  Alb: 2.8 g/dL / Pro: 6.5 g/dL / ALK PHOS: 87 U/L / ALT: 10 U/L / AST: 20 U/L / GGT: x           PT/INR - ( 02 Mar 2025 00:43 )   PT: 13.9 sec;   INR: 1.21 ratio         PTT - ( 02 Mar 2025 04:38 )  PTT:74.5 sec    Urinalysis Basic - ( 02 Mar 2025 00:42 )    Color: x / Appearance: x / SG: x / pH: x  Gluc: 116 mg/dL / Ketone: x  / Bili: x / Urobili: x   Blood: x / Protein: x / Nitrite: x   Leuk Esterase: x / RBC: x / WBC x   Sq Epi: x / Non Sq Epi: x / Bacteria: x      Physical Exam:  General: NAD, resting in the bed   Neurology: nonfocal, ambulates   Eyes: bilateral pupils equal and reactive   ENT/Neck: Neck supple, trachea midline, No JVD, + trach site--cdi    Respiratory: diminished in LLL    CV: S1S2, no murmurs         [x] Afib, rate controlled    Abdominal: Soft, NT, ND +BS, + vomiting    Extremities: No pedal edema noted, + peripheral pulses   Skin: wound vac to sacrum                         Assessment:  55 yo M s/p CABG x 3 on 12/30/24    Postop acute respiratory failure  Acute blood loss anemia  ESRD on iHD   hx of ESBL   hx of VRE  Serratia infection  MSRA infection   sacral decub     Plan:   ***Neuro***  [x] Nonfocal   [x] Pain management with PRNs  Melatonin, Trazadone, Remeron nightly  Robaxin  Post operative neuro assessment     ***Cardiovascular***  Labile hemodynamics large volume resuscitation dual pressor + inotrope  Invasive hemodynamic monitoring, assess perfusion indices   AF / Hct 25.0% / Lactate 1.0  [x] Lopressor 25 mg BID  [x] Amiodarone for a fib  [x] Heparin 1300 units/hr, PTT 50-60  [x] ASA [x] Statin    [x] K> 4 Mg >2   Serial EKG and cardiac enzymes     ***Pulmonary***  [x] Tracheostomy collar   Follow SpO2, CXR,  CXR with continued Left sided collapse, endotracheal suctioning & chest JV    ***GI***  [x] NPO with Vital TF at goal rate of 20 ml/hr  [x] Protonix     Reglan for gut motility  Nausea &  vomiting X2 , oredered Amylase , lipase, portable abd xray/ Rt upper qaundrant Sonogram to r/o Cholecystitis  ***Renal***  Continue to monitor I/Os, BUN/Creatinine.   Replete lytes PRN  Next HD session planned for Monday   Nephro-elicia for renal support    ***ID***  Meropenem for pneumonia    ***Endocrine***  [x] DM2: HbA1c 5.6%             - [x] ISS             - Need tight glycemic control to prevent wound infection.    Patient requires continuous monitoring with bedside rhythm monitoring, pulse oximetry monitoring, and continuous central venous and arterial pressure monitoring; and intermittent blood gas analysis. Care plan discussed with the ICU care team.   Patient remained critical, at risk for life threatening decompensation.    I have spent 50 minutes providing critical care management to this patient.    By signing my name below, IGillian, attest that this documentation has been prepared under the direction and in the presence of Shar Park NP  Electronically signed: Evelio Dickens, 03-02-25 @ 12:19    Shar LEAHY NP, personally performed the services described in this documentation. all medical record entries made by the scribe were at my direction and in my presence. I have reviewed the chart and agree that the record reflects my personal performance and is accurate and complete  Electronically signed: Shar Park NP

## 2025-03-03 LAB
ALBUMIN SERPL ELPH-MCNC: 2.7 G/DL — LOW (ref 3.3–5)
ALP SERPL-CCNC: 82 U/L — SIGNIFICANT CHANGE UP (ref 40–120)
ALT FLD-CCNC: 12 U/L — SIGNIFICANT CHANGE UP (ref 10–45)
ANION GAP SERPL CALC-SCNC: 12 MMOL/L — SIGNIFICANT CHANGE UP (ref 5–17)
APTT BLD: 144.4 SEC — SIGNIFICANT CHANGE UP (ref 24.5–35.6)
APTT BLD: 162 SEC — CRITICAL HIGH (ref 24.5–35.6)
APTT BLD: 49.3 SEC — HIGH (ref 24.5–35.6)
BASOPHILS # BLD AUTO: 0.03 K/UL — SIGNIFICANT CHANGE UP (ref 0–0.2)
BASOPHILS NFR BLD AUTO: 0.4 % — SIGNIFICANT CHANGE UP (ref 0–2)
BILIRUB SERPL-MCNC: 0.3 MG/DL — SIGNIFICANT CHANGE UP (ref 0.2–1.2)
BUN SERPL-MCNC: 33 MG/DL — HIGH (ref 7–23)
CALCIUM SERPL-MCNC: 9 MG/DL — SIGNIFICANT CHANGE UP (ref 8.4–10.5)
CHLORIDE SERPL-SCNC: 95 MMOL/L — LOW (ref 96–108)
CO2 SERPL-SCNC: 27 MMOL/L — SIGNIFICANT CHANGE UP (ref 22–31)
CREAT SERPL-MCNC: 5.47 MG/DL — HIGH (ref 0.5–1.3)
EGFR: 12 ML/MIN/1.73M2 — LOW
EOSINOPHIL # BLD AUTO: 0.72 K/UL — HIGH (ref 0–0.5)
EOSINOPHIL NFR BLD AUTO: 9.1 % — HIGH (ref 0–6)
GLUCOSE SERPL-MCNC: 91 MG/DL — SIGNIFICANT CHANGE UP (ref 70–99)
HCT VFR BLD CALC: 23.5 % — LOW (ref 39–50)
HGB BLD-MCNC: 7.3 G/DL — LOW (ref 13–17)
IMM GRANULOCYTES NFR BLD AUTO: 1.5 % — HIGH (ref 0–0.9)
LYMPHOCYTES # BLD AUTO: 0.91 K/UL — LOW (ref 1–3.3)
LYMPHOCYTES # BLD AUTO: 11.6 % — LOW (ref 13–44)
MCHC RBC-ENTMCNC: 30.7 PG — SIGNIFICANT CHANGE UP (ref 27–34)
MCHC RBC-ENTMCNC: 31.1 G/DL — LOW (ref 32–36)
MCV RBC AUTO: 98.7 FL — SIGNIFICANT CHANGE UP (ref 80–100)
MONOCYTES # BLD AUTO: 1.21 K/UL — HIGH (ref 0–0.9)
MONOCYTES NFR BLD AUTO: 15.4 % — HIGH (ref 2–14)
NEUTROPHILS # BLD AUTO: 4.88 K/UL — SIGNIFICANT CHANGE UP (ref 1.8–7.4)
NEUTROPHILS NFR BLD AUTO: 62 % — SIGNIFICANT CHANGE UP (ref 43–77)
PHOSPHATE SERPL-MCNC: 5 MG/DL — HIGH (ref 2.5–4.5)
PLATELET # BLD AUTO: 236 K/UL — SIGNIFICANT CHANGE UP (ref 150–400)
POTASSIUM SERPL-MCNC: 4.5 MMOL/L — SIGNIFICANT CHANGE UP (ref 3.5–5.3)
POTASSIUM SERPL-SCNC: 4.5 MMOL/L — SIGNIFICANT CHANGE UP (ref 3.5–5.3)
PROT SERPL-MCNC: 6.1 G/DL — SIGNIFICANT CHANGE UP (ref 6–8.3)
RBC # BLD: 2.38 M/UL — LOW (ref 4.2–5.8)
RBC # FLD: 21.2 % — HIGH (ref 10.3–14.5)
SODIUM SERPL-SCNC: 134 MMOL/L — LOW (ref 135–145)
WBC # BLD: 7.87 K/UL — SIGNIFICANT CHANGE UP (ref 3.8–10.5)
WBC # FLD AUTO: 7.87 K/UL — SIGNIFICANT CHANGE UP (ref 3.8–10.5)

## 2025-03-03 PROCEDURE — G0545: CPT

## 2025-03-03 PROCEDURE — 71045 X-RAY EXAM CHEST 1 VIEW: CPT | Mod: 26,76

## 2025-03-03 PROCEDURE — 99291 CRITICAL CARE FIRST HOUR: CPT

## 2025-03-03 PROCEDURE — 99232 SBSQ HOSP IP/OBS MODERATE 35: CPT

## 2025-03-03 RX ORDER — OXYCODONE HYDROCHLORIDE 30 MG/1
10 TABLET ORAL ONCE
Refills: 0 | Status: DISCONTINUED | OUTPATIENT
Start: 2025-03-03 | End: 2025-03-03

## 2025-03-03 RX ORDER — FENTANYL CITRATE-0.9 % NACL/PF 100MCG/2ML
25 SYRINGE (ML) INTRAVENOUS ONCE
Refills: 0 | Status: DISCONTINUED | OUTPATIENT
Start: 2025-03-03 | End: 2025-03-03

## 2025-03-03 RX ORDER — BUDESONIDE 0.25 MG/2ML
0.5 SUSPENSION RESPIRATORY (INHALATION)
Refills: 0 | Status: DISCONTINUED | OUTPATIENT
Start: 2025-03-03 | End: 2025-03-19

## 2025-03-03 RX ADMIN — HEPARIN SODIUM 13.5 UNIT(S)/HR: 1000 INJECTION INTRAVENOUS; SUBCUTANEOUS at 12:33

## 2025-03-03 RX ADMIN — MIRTAZAPINE 7.5 MILLIGRAM(S): 30 TABLET, FILM COATED ORAL at 22:03

## 2025-03-03 RX ADMIN — Medication 1 TABLET(S): at 11:17

## 2025-03-03 RX ADMIN — Medication 3 MILLILITER(S): at 17:40

## 2025-03-03 RX ADMIN — Medication 1 SPRAY(S): at 18:03

## 2025-03-03 RX ADMIN — Medication 3 MILLILITER(S): at 23:27

## 2025-03-03 RX ADMIN — OXYCODONE HYDROCHLORIDE 10 MILLIGRAM(S): 30 TABLET ORAL at 21:27

## 2025-03-03 RX ADMIN — Medication 81 MILLIGRAM(S): at 11:17

## 2025-03-03 RX ADMIN — METOPROLOL SUCCINATE 25 MILLIGRAM(S): 50 TABLET, EXTENDED RELEASE ORAL at 18:02

## 2025-03-03 RX ADMIN — MEROPENEM 100 MILLIGRAM(S): 1 INJECTION INTRAVENOUS at 23:40

## 2025-03-03 RX ADMIN — Medication 40 MILLIGRAM(S): at 11:17

## 2025-03-03 RX ADMIN — Medication 25 MICROGRAM(S): at 12:16

## 2025-03-03 RX ADMIN — Medication 5 MILLIGRAM(S): at 15:17

## 2025-03-03 RX ADMIN — Medication 5 MILLIGRAM(S): at 06:45

## 2025-03-03 RX ADMIN — Medication 5 MILLIGRAM(S): at 23:41

## 2025-03-03 RX ADMIN — Medication 100 MILLIGRAM(S): at 23:41

## 2025-03-03 RX ADMIN — OXYCODONE HYDROCHLORIDE 10 MILLIGRAM(S): 30 TABLET ORAL at 10:07

## 2025-03-03 RX ADMIN — OXYCODONE HYDROCHLORIDE 10 MILLIGRAM(S): 30 TABLET ORAL at 20:53

## 2025-03-03 RX ADMIN — EPOETIN ALFA 10000 UNIT(S): 10000 SOLUTION INTRAVENOUS; SUBCUTANEOUS at 13:34

## 2025-03-03 RX ADMIN — Medication 3 MILLILITER(S): at 11:54

## 2025-03-03 RX ADMIN — METHOCARBAMOL 500 MILLIGRAM(S): 500 TABLET, FILM COATED ORAL at 15:16

## 2025-03-03 RX ADMIN — Medication 500 MILLIGRAM(S): at 11:17

## 2025-03-03 RX ADMIN — METOPROLOL SUCCINATE 25 MILLIGRAM(S): 50 TABLET, EXTENDED RELEASE ORAL at 06:45

## 2025-03-03 RX ADMIN — Medication 300 MILLIGRAM(S): at 11:17

## 2025-03-03 RX ADMIN — Medication 650 MILLIGRAM(S): at 00:00

## 2025-03-03 RX ADMIN — AMIODARONE HYDROCHLORIDE 200 MILLIGRAM(S): 50 INJECTION, SOLUTION INTRAVENOUS at 06:45

## 2025-03-03 RX ADMIN — Medication 1 APPLICATION(S): at 06:44

## 2025-03-03 RX ADMIN — Medication 25 MICROGRAM(S): at 12:31

## 2025-03-03 RX ADMIN — METHOCARBAMOL 500 MILLIGRAM(S): 500 TABLET, FILM COATED ORAL at 22:03

## 2025-03-03 RX ADMIN — Medication 650 MILLIGRAM(S): at 00:30

## 2025-03-03 RX ADMIN — Medication 3 MILLILITER(S): at 05:07

## 2025-03-03 RX ADMIN — ATORVASTATIN CALCIUM 80 MILLIGRAM(S): 80 TABLET, FILM COATED ORAL at 22:02

## 2025-03-03 RX ADMIN — OXYCODONE HYDROCHLORIDE 10 MILLIGRAM(S): 30 TABLET ORAL at 10:22

## 2025-03-03 RX ADMIN — Medication 1 APPLICATION(S): at 22:02

## 2025-03-03 RX ADMIN — BUDESONIDE 0.5 MILLIGRAM(S): 0.25 SUSPENSION RESPIRATORY (INHALATION) at 17:39

## 2025-03-03 NOTE — PROGRESS NOTE ADULT - ASSESSMENT
53 yo M with multiple medical problems including CAD s/p PCI in 2024 s/p CABG x 3 on 24  1: Intubated for hypoxia & left lung white out s/p awake bronch with removal of copious mucopurulent secretions  : s/p IABP insertion, sepsis/septic shock due to E coli   ESBL pneumonia, collapsed Left Lung, s/p multiple bronch    tracheostomy tube placement    VRE E. Faecium Bcx   +HSV PCR BAL   tissue cx from back abscess - Serratia/MRSA  - - intermittent bradycardia/junctional episodes with hypotension  2/3: off , off theophylline. iHD 1L UF  : bronch for Left lung collapse wound vac, 2decho w/ moderate pericardial effusion - similar as before, US abd: small - moderate ascites - monitor at this time.  Wound vac placed for sacral wounds  : iHD-1L UF  : bronch today off positive pressure   : concern for left food drop   2/10 : no acute issues - CXR shows improving left sided aeration, pt subjectively reports having difficulty while lying flat  : s/p Paracentesis 3.5 L removed, leukocytosis   : Ascites fluid PMN < 250 (less likely SBP)   sniff test yesterday showed paradoxical diaphragmatic motion    : mucus plugging but able to clear airway with aggressive pulmonary toileting.   : no vent requirement overnight, able to mobilize secretion with pulm toileting  hyperkalemia post HD - workup for possible recirculation underway   : On TC X 48 hours - ambulating well, no mucus plugging events  : HFTC x24hrs (40L 30%)  : iHD 1.5L UF  : iHD 2L  : trach downsized to 7, bronch  - cultures growing ESBL E Coli       #S/p CABG  #Respiratory failure due to recurrent left lung plugging now s/p trach  #ESRD on HD  #Sacral decub   #Recurrent ascites likely from Chronic RV failure - s/p paracentesis   #Left Diaphragm dysfunction     A/P    #ESBL E COLI  pt with no fever and no leucocytosis but team believes that pts status improved since starting abs  we mutually agreed on  a short course of abs to treat a tracheobronchitis  pt was plugging   Aggressive pulm toileting, chest percussion, suction as needed  Continue Trach collar wean oxygen as tolerated    #Ascites  History of Ascites - abdominal exam stable, no fluid shift or increased girth, no acute indication for paracenthesis    #back lesion  resolved    #decubitus  vac   off loading   improving  local wound care      Marla Champion M.D. ,   please reach via teams   If no answer, or after 5PM/ weekends,  then please call  [phone]    Assessment and plan discussed with the primary team .

## 2025-03-03 NOTE — PROGRESS NOTE ADULT - SUBJECTIVE AND OBJECTIVE BOX
Brief history :   53 yo M with multiple medical problems including CAD s/p PCI in 2024 s/p CABG x 3 on 24  1: Intubated for hypoxia & left lung white out s/p awake bronch with removal of copious mucopurulent secretions  : s/p IABP insertion, sepsis/septic shock due to E coli   ESBL pneumonia, collapsed Left Lung, s/p multiple bronch    tracheostomy tube placement    VRE E. Faecium Bcx   +HSV PCR BAL   tissue cx from back abscess - Serratia/MRSA  - - intermittent bradycardia/junctional episodes with hypotension  2/3: off , off theophylline. iHD 1L UF  : bronch for Left lung collapse wound vac, 2decho w/ moderate pericardial effusion - similar as before, US abd: small - moderate ascites - monitor at this time.  Wound vac placed for sacral wounds  : iHD-1L UF  : bronch today off positive pressure   : concern for left food drop   2/10 : no acute issues - CXR shows improving left sided aeration, pt subjectively reports having difficulty while lying flat  : s/p Paracentesis 3.5 L removed, leukocytosis   : Ascites fluid PMN < 250 (less likely SBP)   sniff test yesterday showed paradoxical diaphragmatic motion    : mucus plugging but able to clear airway with aggressive pulmonary toileting.   : no vent requirement overnight, able to mobilize secretion with pulm toileting  hyperkalemia post HD - workup for possible recirculation underway   : On TC X 48 hours - ambulating well, no mucus plugging events  : HFTC x24hrs (40L 30%)  : iHD 1.5L UF  : iHD 2L  : trach downsized to 7, bronch  - cultures growing ESBL E Coli       ICU Vital Signs Last 24 Hrs  T(C): 36.6 (25 @ 08:00), Max: 36.7 (25 @ 12:00)  T(F): 97.8 (25 @ 08:00), Max: 98 (25 @ 12:00)  HR: 93 (03-03-25 @ 10:00) (57 - 123)  BP: 118/60 (25 @ 09:00) (105/65 - 188/81)  BP(mean): 87 (25 @ 09:00) (67 - 111)  RR: 19 (25 @ 10:00) (9 - 45)  SpO2: 82% (25 @ 10:00) (82% - 100%)    I&O's Summary    02 Mar 2025 07:01  -  03 Mar 2025 07:00  --------------------------------------------------------  IN: 929 mL / OUT: 2 mL / NET: 927 mL    03 Mar 2025 07:01  -  03 Mar 2025 11:16  --------------------------------------------------------  IN: 92 mL / OUT: 1 mL / NET: 91 mL      ABG - ( 02 Mar 2025 14:19 )  pH: 7.44  /  pCO2: 42    /  pO2: 96    / HCO3: 28    / Base Excess: 4.0   /  SaO2: 98.1                   7.3    7.87  )-----------( 236      ( 03 Mar 2025 03:58 )             23.5     03 Mar 2025 03:58    134    |  95     |  33     ----------------------------<  91     4.5     |  27     |  5.47     Ca    9.0        03 Mar 2025 03:58  Phos  5.0       03 Mar 2025 03:58  Mg     2.5       03 Mar 2025 03:58    TPro  6.1    /  Alb  2.7    /  TBili  0.3    /  DBili  x      /  AST  16     /  ALT  12     /  AlkPhos  82     03 Mar 2025 03:58    PT/INR - ( 02 Mar 2025 00:43 )   PT: 13.9 sec;   INR: 1.21 ratio    PTT - ( 03 Mar 2025 03:58 )  PTT:49.3 sec      MEDICATIONS  (STANDING):  aMIOdarone    Tablet 200 milliGRAM(s) Oral daily  ascorbic acid 500 milliGRAM(s) Oral daily  aspirin  chewable 81 milliGRAM(s) Oral daily  atorvastatin 80 milliGRAM(s) Oral at bedtime  chlorhexidine 2% Cloths 1 Application(s) Topical daily  chlorhexidine 4% Liquid 1 Application(s) Topical <User Schedule>  dextrose 50% Injectable 50 milliLiter(s) IV Push every 15 minutes  epoetin ignacia (EPOGEN) Injectable 46355 Unit(s) IV Push <User Schedule>  ferrous    sulfate Liquid 300 milliGRAM(s) Enteral Tube daily  heparin  Infusion 1300 Unit(s)/Hr IV Continuous <Continuous>  insulin lispro (ADMELOG) corrective regimen sliding scale   SubCutaneous every 6 hours  melatonin 5 milliGRAM(s) Oral at bedtime  meropenem  IVPB 500 milliGRAM(s) IV Intermittent every 24 hours  methocarbamol 500 milliGRAM(s) Oral every 8 hours  metoclopramide Injectable 5 milliGRAM(s) IV Push every 8 hours  metoprolol tartrate 25 milliGRAM(s) Oral two times a day  mirtazapine 7.5 milliGRAM(s) Oral at bedtime  Nephro-elicia 1 Tablet(s) Oral daily  pantoprazole  Injectable 40 milliGRAM(s) IV Push daily  sodium chloride 0.65% Nasal 1 Spray(s) Both Nostrils every 12 hours  sodium chloride 0.9% for Nebulization 3 milliLiter(s) Nebulizer every 6 hours  sodium chloride 0.9%. 1000 milliLiter(s) IV Continuous <Continuous>  sodium zirconium cyclosilicate 10 Gram(s) Oral <User Schedule>  traZODone 100 milliGRAM(s) Oral at bedtime    PHYSICAL EXAM:  Gen : no acute distress  Neck: + trach   Respiratory: decreased in the bases  Cardiovascular: AFlutter  Gastrointestinal: distended, soft   Extremities: No peripheral edema  Vascular: 2+ peripheral pulses    S/p CABG  Respiratory failure due to recurrent left lung plugging now s/p trach  ESRD on HD  Post op AFib   History of RV dysfunction   h/o Recurrent ascites   Post operative pain   Sacral decub   Recurrent ascites likely from Chronic RV failure - s/p paracentesis   Left Diaphragm dysfunction     s/p CABG in setting of biV dysfunction.  Recovered from surgery current active issue has been recurrent left lung mucus plugging.  Pt also has left diaphragm dysfunction  s/p trach for secretion management.    Aggressive pulm toileting, chest percussion, suction as needed  Continue Trach collar wean oxygen as tolerated  Short course of antibiotics to cover for tracheobronchitis   HD per renal  History of Ascites - abdominal exam stable, no fluid shift or increased girth, no acute indication for paracenthesis  PT, walking.   minimize labs  Continue Fe/Epo - anemia management  s/p FEES noted to have extensive edema above vocal cords -  Eval for supraglottic edema  - start budesonide nebs, improved phonation, continue speech therapy.       Critical care time spent  51  min

## 2025-03-03 NOTE — SWALLOW FEES ASSESSMENT ADULT - DIAGNOSTIC IMPRESSIONS
54y M w/ complicated hospital course that includes s/p C3L on 12/30, multiple intubations x3, cardiogenic shock requiring IABP, s/p trach 1/16, cardiac arrest, back/sacral abscess s/p debridement, multiple infections, and L hemidiaphragm dysfunction 2/13 s/p L robotic VATS diapharagm plication. Pt presents w/ poor secretion management evidenced by baseline pooling, laryngeal penetration, and aspiration of secretions. Exam c/f b/l vocal cord dysfunction resulting in vocal cords resting at midline; slight abduction noted along the posterior vocal cords during effortful/deep inhalation. PO trials deferred given poor secretion management and c/f vocal cord dysfunction. Pt should remain NPO w/ alternative means of nutrition/hydration/medication. Per MD Mendez, ENT consult for further assessment of the upper airway and laryngeal structures. This service will continue to follow Pt pending ENT recommendations.

## 2025-03-03 NOTE — PROGRESS NOTE ADULT - ASSESSMENT
54M with PMH of HTN, HLD, ESRD on HD MWF via LUE AVF, ascites (requiring repeat paracenteses q4-6wks), NICM with moderate LV dysfunction w/ EF 35-40%() and CAD s/p atherectomy + SALLIE to D1(2024) presents to [facility] transferred from [facility].  Cardiac cath which showed pLAD 70% ISR, mLCx 70%, D1 severe dz.   Patient now transferred to [facility] CTS Dr. Rivers for CABG eval      # CAD s/p NSTEMI  Hx CAD s/p PCI LAD   Presented with ADHF elevated troponins  Positive NST1-Cath- pLAD 70% ISR, mLCx 70%, D1 severe dz.   TTE EF 35% Severe TRWas on Plavix-p2y12- 321 been off Plavix since -POD#1-C3L free LIMA-LAD; SVG-Diag; [id number]-egzfBU-Jzrdcpzll on  Sinus rhythm,Amio for AF prophylaxis   Atrial Flutter on TC during day Ambulating  -Increased congestion on CXR had HD yesterday remains in AFl   -Atrial Flutter HFTC 40L/30% BP stable     -Atrial Flutter tolerating TC for HD MWF-consider DOAC   OOB Abd US moderate 4 quad ascites noted remains in Atrial Flutter~~70's   TC Atrial Flutter  -s/p Bronch/BAL remains in AFl  Trach downsized 7   -Tolerating TC AFl rate controlled On Heparin gtt transition to DOAC      # Acute on Chronic Systolic Heart Failure now improved  EF-35% ,severe TR  Had HD post op  GDMT post op  LVEF improved post bypass   -TTE EF 40%,trace effusion  ECG c/w pericarditis-was on colchicine   01/15-TTE EF 55%  -TTE EF 60%   -Normal LV systolic function Moderate pericardial effusion  -On TC-HF and Vent support at nights   02/10-Vent HS with T Collar trial Ambulated Remains in AF  Repeat TTE Normal LV Systolic function Small Pericardial effusion decreased from 2/3        Vascular evaluated  AVGraft /?steal causing RV failure-'' Very low suspicion of steal Sd and AVF causing current clinical state. ''   VA Duplex Hemodialysis Access, Left (25 @ 13:35) >   Arterial flow volume of 1301 mL/m, and the venous flow volume of  1492 mL/m are consistent with a normally functioning dialysis access  fistula.      # Ascites  Hx chronic ascites  Has drainage q4-6 weeks  Last drained -4600mL  ? ascites related to severe TR  Had luvodicechqm-Jknupmz-zh growth   CT Abdomen 01/10-Large volume ascites-  US abdomen--Small to moderate volume abdominal/pelvic ascites  US abdomen  Moderate ascites  US Abdomen -Moderate 4 quad ascites    # ESRD  Now on  HD-MWF

## 2025-03-03 NOTE — PROGRESS NOTE ADULT - ASSESSMENT
54M with PMH of HTN, HLD, ESRD on HD MWF via LUE AVF, ascites (requiring repeat paracenteses q4-6wks), NICM with moderate LV dysfunction w/ EF 35-40%(2023) and CAD s/p atherectomy + SALLIE to D1(4/11/2024) presents to [facility] transferred from [facility] for CABG eval  Nephro on board for HD needs.    ESRD on HD   Nephrologist Dr. Bailey  [facility] Schedule / Access:  LUE AVF  Center: [facility]  Pt s/p HD on 2/19 and 2/21.  s/p HD 2/24 uf 1.5L  S/p HD on 02/26 2 L UF and 02/28 2 L UF again   HD today per cristin   Keep MAP>65  Renal Diet  HD consent in chart     Hyper/Hypokalemia  Pt intermittently Hyperkalemia  HD as scheduled  now improved  Low K diet.  C/W Lokelma 10gm on nonHD days.  Monitor K.    HTN  BP fluctuating; controlled.  UF w/ HD as BP permits.  Low sodium diet.  Management per ICU  Monitor BP     CKD MBD  Check PTH   Low PO4 diet.  Not on binder.  Monitor Ca , PO4 daily     Anemia  CRISTIANE with HD  Repeat iron studies.  Transfuse if hgb <7    CAD with multivessel disease  CTICU following  s/p CABG 12/30    Hypoxic respiratory failure requiring Trach  Per surgery  S/p bronchoscopy, pulm following

## 2025-03-03 NOTE — SWALLOW FEES ASSESSMENT ADULT - MODE OF PRESENTATION
PO trials withheld given poor secretion management and c/f upper airway patency PO trials withheld given poor secretion management and c/f vocal cord dysfunction

## 2025-03-03 NOTE — SWALLOW FEES ASSESSMENT ADULT - COMMENTS
+ESRD on HD. Multi-CAD s/p C3L on 12/30/24. Extubated (12/31) after x1 day. Post-op, patient developed worsening cardiogenic shock requiring inotropic support and an IABP; reintubated 1/2-1/8. Extubated to HFNC. 1/11: Findings: thick mucoid secretion in Left bronchus, poor cough effort; ...Airway evaluation revealed Sharp Abena. AUGUSTO and LLL evaluation revealed inflamed airway, thick mucoid secretion. RUL, RML, RLL revealed minimal secretion. Post xray shows improved aeration of Left lung. Bedside swallow evaluation and FEES performed. 1/15 Pt w/ respiratory distress and cardiac arrest requiring intubation. S/p trach 1/16. No PMV evaluation/placement per MD. No plans for swallow evaluation, SLP signed off 1/31. +New abscess in right buttocks and coccygeal area, s/p I&D of back abscess and sacral decub 2/1. Wound vac in place. L chest tube placed for pleural effusion. ID following throughout hospital course for multiple infections. EP following for intermittent bradycardia w/ hypotension, dobutabmine re-initiated. CXR w/ L lung collapse s/p bronch 2/4. Neuro consulted for L foot drop and notes "Presentation appears to peripheral in etiology given the involvement of multiple nerves." CTSx consulted due to concern for diaphragmatic paralysis, sniff test yesterday showed paradoxical diaphragmatic motion. +L hemidiaphragm dysfunction. S/p paracentesis 2/11. 2/13 s/p Left robotic VATS Diapharagm plication. S/p trach exchange 2/27. 3/1 Pt nauseous and vomiting    ***Seen for initial bedside swallow evaluation 1/11/25 with c/f pharyngeal dysphagia and mild dysphonia. NPO, with non-oral nutrition/hydration/medications. FEES 1/13 notable for "pooling and penetration of frothy secretions are noted. Secretions are not cleared with use of ice chips. There is reduced airway closure and reduced supra/subglottic sensation contributing to laryngeal penetration and aspiration of purees, thickened liquids and ice chips." Reduced movement of the left vocal cord is noted. +NGT +NGT  At baseline, b/l vocal cords appear to be adducted at midline--slight abduction of posterior vocal cords noted during effortful/deep inhalation; Minimal improvement in vocal cord abduction noted w/ digital occlusion of trach for respiration and phonation--backflow of air noted following brief digital occlusion; Respiratory therapists and MD Mendez called to bedside to assess; PO trials subsequently deferred per MD and ENT consult requested

## 2025-03-03 NOTE — PROGRESS NOTE ADULT - SUBJECTIVE AND OBJECTIVE BOX
Patient is a 54y old  Male who presents with a chief complaint of CAD s/p CABG (02 Mar 2025 09:04)    Being followed by ID for        Interval history:  No other acute events      ROS:  No cough,SOB,CP  No N/V/D  No abd pain  No urinary complaints  No HA  No joint or limb pain  No other complaints    PAST MEDICAL & SURGICAL HISTORY:  Type 2 diabetes mellitus      Hypertension      End stage renal disease  started HD 2/2019 T, Th, Sat via right chest permacath      Bone spur  right shoulder- hx of - sx done      Anemia      Injury of right wrist  hx of at age 14      History of hyperkalemia  before HD- K-6.2 - to repeat in am of sx, pt had HD today      S/P arthroscopy of right shoulder  2010      History of vascular access device  right chest permacath - 2/2019      H/O right wrist surgery  tendons repair - at age 14      AV fistula      H/O ventral hernia repair      S/P cholecystectomy        Allergies    No Known Allergies    Intolerances      Antimicrobials:    meropenem  IVPB 500 milliGRAM(s) IV Intermittent every 24 hours    MEDICATIONS  (STANDING):  aMIOdarone    Tablet 200 milliGRAM(s) Oral daily  ascorbic acid 500 milliGRAM(s) Oral daily  aspirin  chewable 81 milliGRAM(s) Oral daily  atorvastatin 80 milliGRAM(s) Oral at bedtime  chlorhexidine 2% Cloths 1 Application(s) Topical daily  chlorhexidine 4% Liquid 1 Application(s) Topical <User Schedule>  dextrose 50% Injectable 50 milliLiter(s) IV Push every 15 minutes  epoetin ignacia (EPOGEN) Injectable 40354 Unit(s) IV Push <User Schedule>  ferrous    sulfate Liquid 300 milliGRAM(s) Enteral Tube daily  heparin  Infusion 1300 Unit(s)/Hr (13 mL/Hr) IV Continuous <Continuous>  insulin lispro (ADMELOG) corrective regimen sliding scale   SubCutaneous every 6 hours  melatonin 5 milliGRAM(s) Oral at bedtime  meropenem  IVPB 500 milliGRAM(s) IV Intermittent every 24 hours  methocarbamol 500 milliGRAM(s) Oral every 8 hours  metoclopramide Injectable 5 milliGRAM(s) IV Push every 8 hours  metoprolol tartrate 25 milliGRAM(s) Oral two times a day  mirtazapine 7.5 milliGRAM(s) Oral at bedtime  Nephro-elicia 1 Tablet(s) Oral daily  pantoprazole  Injectable 40 milliGRAM(s) IV Push daily  sodium chloride 0.65% Nasal 1 Spray(s) Both Nostrils every 12 hours  sodium chloride 0.9% for Nebulization 3 milliLiter(s) Nebulizer every 6 hours  sodium chloride 0.9%. 1000 milliLiter(s) (10 mL/Hr) IV Continuous <Continuous>  sodium zirconium cyclosilicate 10 Gram(s) Oral <User Schedule>  traZODone 100 milliGRAM(s) Oral at bedtime      Vital Signs Last 24 Hrs  T(C): 36.6 (03-03-25 @ 08:00), Max: 36.7 (03-02-25 @ 12:00)  T(F): 97.8 (03-03-25 @ 08:00), Max: 98 (03-02-25 @ 12:00)  HR: 58 (03-03-25 @ 09:00) (57 - 123)  BP: 118/60 (03-03-25 @ 09:00) (105/65 - 188/81)  BP(mean): 87 (03-03-25 @ 09:00) (67 - 111)  RR: 14 (03-03-25 @ 09:00) (9 - 45)  SpO2: 100% (03-03-25 @ 09:00) (90% - 100%)    Physical Exam:    Constitutional well preserved,comfortable,pleasant    HEENT PERRLA EOMI,No pallor or icterus    No oral exudate or erythema    Neck supple no JVD or LN    Chest Good AE,CTA    CVS RRR S1 S2 WNl No murmur or rub or gallop    Abd soft BS normal No tenderness no masses    Ext No cyanosis clubbing or edema    IV site no erythema tenderness or discharge    Joints no swelling or LOM    CNS AAO X 3 no focal    Lab Data:                          7.3    7.87  )-----------( 236      ( 03 Mar 2025 03:58 )             23.5       03-03    134[L]  |  95[L]  |  33[H]  ----------------------------<  91  4.5   |  27  |  5.47[H]    Ca    9.0      03 Mar 2025 03:58  Phos  5.0     03-03  Mg     2.5     03-03    TPro  6.1  /  Alb  2.7[L]  /  TBili  0.3  /  DBili  x   /  AST  16  /  ALT  12  /  AlkPhos  82  03-03      Urinalysis Basic - ( 03 Mar 2025 03:58 )    Color: x / Appearance: x / SG: x / pH: x  Gluc: 91 mg/dL / Ketone: x  / Bili: x / Urobili: x   Blood: x / Protein: x / Nitrite: x   Leuk Esterase: x / RBC: x / WBC x   Sq Epi: x / Non Sq Epi: x / Bacteria: x        Bronchial Bronchial Lavage  02-28-25   Numerous Escherichia coli ESBL  Commensal manjit consistent with body site  --  Escherichia coli ESBL                    WBC Count: 7.87 (03-03-25 @ 03:58)  WBC Count: 7.96 (03-02-25 @ 00:43)  WBC Count: 7.37 (03-01-25 @ 00:38)  WBC Count: 8.59 (02-28-25 @ 00:29)  WBC Count: 8.78 (02-26-25 @ 23:40)  WBC Count: 10.08 (02-26-25 @ 00:42)  WBC Count: 9.76 (02-25-25 @ 00:53)       Bilirubin Total: 0.3 mg/dL (03-03-25 @ 03:58)  Aspartate Aminotransferase (AST/SGOT): 16 U/L (03-03-25 @ 03:58)  Alanine Aminotransferase (ALT/SGPT): 12 U/L (03-03-25 @ 03:58)  Alkaline Phosphatase: 82 U/L (03-03-25 @ 03:58)  Bilirubin Total: 0.4 mg/dL (03-02-25 @ 14:23)  Aspartate Aminotransferase (AST/SGOT): 22 U/L (03-02-25 @ 14:23)  Alanine Aminotransferase (ALT/SGPT): 12 U/L (03-02-25 @ 14:23)  Alkaline Phosphatase: 92 U/L (03-02-25 @ 14:23)      < from: Xray Abdomen 1 View PORTABLE -Routine (Xray Abdomen 1 View PORTABLE -Routine .) (03.02.25 @ 15:12) >    ACC: [id number] EXAM:  XR ABDOMEN PORTABLE ROUTINE 1V   ORDERED BY: ES URIARTE     PROCEDURE DATE:  03/02/2025          INTERPRETATION:  XR ABDOMEN    INDICATION:  Nausea/ vomiting.    TECHNIQUE:  A supine view of the abdomen and pelvis were obtained.    FINDINGS:  Partially imaged sternotomy wires. Thin epicardial pacing   wires are partially imaged.  A feeding tube is seen in the distal stomach. Mesh overlies the mid   abdomen and pelvis. Clips are seen overlying the pelvis.  There is no evidence of bowel obstruction. There is faint contrast noted   in a nondilated colon.    IMPRESSION:  No evidence of bowel obstruction.    --- End of Report ---            JOSE ANTONIO SHETTY MD; Attending Radiologist  This document has been electronically signed. Mar  2 2025  7:41PM    < end of copied text >    < from: Xray Chest 1 View- PORTABLE-Routine (Xray Chest 1 View- PORTABLE-Routine in AM.) (03.02.25 @ 07:13) >    ACC: [id number] EXAM:  XR CHEST PORTABLE ROUTINE 1V   ORDERED BY: STACY AGUILERA     PROCEDURE DATE:  03/02/2025          INTERPRETATION:  DATE OF STUDY: 3/2/25    PRIOR:None    CLINICAL INDICATION: Postop    TECHNIQUE: AP radiograph of the chest.    FINDINGS:  Tracheostomy tube, sternal wires and enteric tube unchanged.  Heart size is poorly assessed on this projection.  Progression/worsening pulmonary vascular congestive changes noted.  Interval increase in left pleural effusion.    IMPRESSION:    Progression of pulmonary vascular congestion and increasing left pleural   effusion.    --- End of Report ---      < end of copied text >         Patient is a 54y old  Male who presents with a chief complaint of CAD s/p CABG (02 Mar 2025 09:04)    Being followed by ID for        Interval history:  pt tolerating trach collar  pt was restarted on abs for positive sputum culture  buttocks lesion improving  decreased sputum production  pt c/o nausea- blames it on the feeds  No other acute events        PAST MEDICAL & SURGICAL HISTORY:  Type 2 diabetes mellitus      Hypertension      End stage renal disease  started HD 2/2019 T, Th, Sat via right chest permacath      Bone spur  right shoulder- hx of - sx done      Anemia      Injury of right wrist  hx of at age 14      History of hyperkalemia  before HD- K-6.2 - to repeat in am of sx, pt had HD today      S/P arthroscopy of right shoulder  2010      History of vascular access device  right chest permacath - 2/2019      H/O right wrist surgery  tendons repair - at age 14      AV fistula      H/O ventral hernia repair      S/P cholecystectomy        Allergies    No Known Allergies    Intolerances      Antimicrobials:    meropenem  IVPB 500 milliGRAM(s) IV Intermittent every 24 hours    MEDICATIONS  (STANDING):  aMIOdarone    Tablet 200 milliGRAM(s) Oral daily  ascorbic acid 500 milliGRAM(s) Oral daily  aspirin  chewable 81 milliGRAM(s) Oral daily  atorvastatin 80 milliGRAM(s) Oral at bedtime  chlorhexidine 2% Cloths 1 Application(s) Topical daily  chlorhexidine 4% Liquid 1 Application(s) Topical <User Schedule>  dextrose 50% Injectable 50 milliLiter(s) IV Push every 15 minutes  epoetin ignacia (EPOGEN) Injectable 00542 Unit(s) IV Push <User Schedule>  ferrous    sulfate Liquid 300 milliGRAM(s) Enteral Tube daily  heparin  Infusion 1300 Unit(s)/Hr (13 mL/Hr) IV Continuous <Continuous>  insulin lispro (ADMELOG) corrective regimen sliding scale   SubCutaneous every 6 hours  melatonin 5 milliGRAM(s) Oral at bedtime  meropenem  IVPB 500 milliGRAM(s) IV Intermittent every 24 hours  methocarbamol 500 milliGRAM(s) Oral every 8 hours  metoclopramide Injectable 5 milliGRAM(s) IV Push every 8 hours  metoprolol tartrate 25 milliGRAM(s) Oral two times a day  mirtazapine 7.5 milliGRAM(s) Oral at bedtime  Nephro-elicia 1 Tablet(s) Oral daily  pantoprazole  Injectable 40 milliGRAM(s) IV Push daily  sodium chloride 0.65% Nasal 1 Spray(s) Both Nostrils every 12 hours  sodium chloride 0.9% for Nebulization 3 milliLiter(s) Nebulizer every 6 hours  sodium chloride 0.9%. 1000 milliLiter(s) (10 mL/Hr) IV Continuous <Continuous>  sodium zirconium cyclosilicate 10 Gram(s) Oral <User Schedule>  traZODone 100 milliGRAM(s) Oral at bedtime      Vital Signs Last 24 Hrs  T(C): 36.6 (03-03-25 @ 08:00), Max: 36.7 (03-02-25 @ 12:00)  T(F): 97.8 (03-03-25 @ 08:00), Max: 98 (03-02-25 @ 12:00)  HR: 58 (03-03-25 @ 09:00) (57 - 123)  BP: 118/60 (03-03-25 @ 09:00) (105/65 - 188/81)  BP(mean): 87 (03-03-25 @ 09:00) (67 - 111)  RR: 14 (03-03-25 @ 09:00) (9 - 45)  SpO2: 100% (03-03-25 @ 09:00) (90% - 100%)    Physical Exam:    Constitutional well preserved,comfortable,pleasant    HEENT PERRLA EOMI,No pallor or icterus    No oral exudate or erythema    Neck trach collar    Chest Good AE,CTA    CVS  S1 S2     Abd soft BS normal No tenderness     Ext No cyanosis clubbing or edema    vac on buttocks   back lesion healed     IV site no erythema tenderness or discharge    Joints no swelling or LOM    CNS AAO X 3 no focal    Lab Data:                          7.3    7.87  )-----------( 236      ( 03 Mar 2025 03:58 )             23.5       03-03    134[L]  |  95[L]  |  33[H]  ----------------------------<  91  4.5   |  27  |  5.47[H]    Ca    9.0      03 Mar 2025 03:58  Phos  5.0     03-03  Mg     2.5     03-03    TPro  6.1  /  Alb  2.7[L]  /  TBili  0.3  /  DBili  x   /  AST  16  /  ALT  12  /  AlkPhos  82  03-03        Bronchial Bronchial Lavage  02-28-25   Numerous Escherichia coli ESBL  Commensal manjit consistent with body site  --  Escherichia coli ESBL    < from: Xray Chest 1 View- PORTABLE-Urgent (Xray Chest 1 View- PORTABLE-Urgent .) (03.03.25 @ 13:46) >    INTERPRETATION:  CLINICAL INDICATION: Assess left lung    EXAM: Frontal radiographs of the chest.    COMPARISON: Chest radiograph from 3/2/2025    FINDINGS:  3/3/2025 6:00 AM  Tracheostomy. Sternotomy wires. Enteric tube courses to the diaphragm and   crosses midline, likely terminating within the distal stomach/proximal   duodenum.  Hazy bilateral opacities, left greater than right.  Small to moderate left pleural effusion. No pneumothorax  The heart size is enlarged.  No acute osseous abnormality.    3/3/2025 1:14 PM  No interval change.    IMPRESSION:  Unchanged mild pulmonary vascular congestion and left pleural effusion.    < end of copied text >                  WBC Count: 7.87 (03-03-25 @ 03:58)  WBC Count: 7.96 (03-02-25 @ 00:43)  WBC Count: 7.37 (03-01-25 @ 00:38)  WBC Count: 8.59 (02-28-25 @ 00:29)  WBC Count: 8.78 (02-26-25 @ 23:40)  WBC Count: 10.08 (02-26-25 @ 00:42)  WBC Count: 9.76 (02-25-25 @ 00:53)       Bilirubin Total: 0.3 mg/dL (03-03-25 @ 03:58)  Aspartate Aminotransferase (AST/SGOT): 16 U/L (03-03-25 @ 03:58)  Alanine Aminotransferase (ALT/SGPT): 12 U/L (03-03-25 @ 03:58)  Alkaline Phosphatase: 82 U/L (03-03-25 @ 03:58)  Bilirubin Total: 0.4 mg/dL (03-02-25 @ 14:23)  Aspartate Aminotransferase (AST/SGOT): 22 U/L (03-02-25 @ 14:23)  Alanine Aminotransferase (ALT/SGPT): 12 U/L (03-02-25 @ 14:23)  Alkaline Phosphatase: 92 U/L (03-02-25 @ 14:23)      < from: Xray Abdomen 1 View PORTABLE -Routine (Xray Abdomen 1 View PORTABLE -Routine .) (03.02.25 @ 15:12) >    ACC: [id number] EXAM:  XR ABDOMEN PORTABLE ROUTINE 1V   ORDERED BY: ES URIARTE     PROCEDURE DATE:  03/02/2025          INTERPRETATION:  XR ABDOMEN    INDICATION:  Nausea/ vomiting.    TECHNIQUE:  A supine view of the abdomen and pelvis were obtained.    FINDINGS:  Partially imaged sternotomy wires. Thin epicardial pacing   wires are partially imaged.  A feeding tube is seen in the distal stomach. Mesh overlies the mid   abdomen and pelvis. Clips are seen overlying the pelvis.  There is no evidence of bowel obstruction. There is faint contrast noted   in a nondilated colon.    IMPRESSION:  No evidence of bowel obstruction.    --- End of Report ---            JOSE ANTONIO SHETTY MD; Attending Radiologist  This document has been electronically signed. Mar  2 2025  7:41PM    < end of copied text >    < from: Xray Chest 1 View- PORTABLE-Routine (Xray Chest 1 View- PORTABLE-Routine in AM.) (03.02.25 @ 07:13) >    ACC: [id number] EXAM:  XR CHEST PORTABLE ROUTINE 1V   ORDERED BY: STACY AGUILERA     PROCEDURE DATE:  03/02/2025          INTERPRETATION:  DATE OF STUDY: 3/2/25    PRIOR:None    CLINICAL INDICATION: Postop    TECHNIQUE: AP radiograph of the chest.    FINDINGS:  Tracheostomy tube, sternal wires and enteric tube unchanged.  Heart size is poorly assessed on this projection.  Progression/worsening pulmonary vascular congestive changes noted.  Interval increase in left pleural effusion.    IMPRESSION:    Progression of pulmonary vascular congestion and increasing left pleural   effusion.    --- End of Report ---      < end of copied text >

## 2025-03-03 NOTE — SWALLOW FEES ASSESSMENT ADULT - PRELIMINARY ENDOSCOPIC EXAMINATIONS
Baseline pooling of secretions/Baseline penetration of secretions Baseline pooling of secretions/Baseline aspiration of secretions/Baseline penetration of secretions

## 2025-03-03 NOTE — PROGRESS NOTE ADULT - SUBJECTIVE AND OBJECTIVE BOX
MR#[id number]  PATIENT NAME:RAAD CANO    DATE OF SERVICE: 03-03-25 @ 15:04  Patient was seen and examined by Solo Quick MD on    03-03-25 @ 15:04 .  Interim events noted.Consultant notes ,Labs,Telemetry reviewed by me       HOSPITAL COURSE: HPI:  54M with PMH of HTN, HLD, ESRD on HD MWF via LUE AVF, ascites (requiring repeat paracenteses q4-6wks), NICM with moderate LV dysfunction w/ EF 35-40%(2023) and CAD s/p atherectomy + SALLIE to D1(4/11/2024) presents to [facility] transferred from [facility]. Patient initially presented to [facility] ED with shortness of breath. Of note he was recently admitted from 09/27-12/02 for acute cholecystitis s/p cholecystectomy. In [facility] ED, pt was hypoxic to 86% on RA, trop 1.7K, EKG no new changes, CXR fluid overload. Admitted for AHRF 2/2 fluid overload. Pt received HD on 12/18, 12/19, 12/20 and 12/22. DAPT was resumed, TTE showed improvement in LVEF to 40-45%. Stress test was abnormal with 1mm inferior STD, reversible ischemia in the inferior wall and fixed defects in the lateral, anterior and apical walls with post stress EF of 24%. Pt was then transferred to [facility] for cardiac cath which showed pLAD 70% ISR, mLCx 70%, D1 severe dz. Patient now transferred to [facility] CTS Dr. Rivers for CABG eval. Patient denies any current SOB or chest pain on admission. Otherwise denies headaches, abdominal pain, urinary or bowel changes, fevers, chills, N/V/D, sick contacts.  (24 Dec 2024 05:07)      INTERIM EVENTS:Patient seen at bedside ,interim events noted.      PMH -reviewed admission note, no change since admission  HEART FAILURE: Acute[ ]Chronic[ ] Systolic[ ] Diastolic[ ] Combined Systolic and Diastolic[ ]  CAD[ ] CABG[ ] PCI[ ]  DEVICES[ ] PPM[ ] ICD[ ] ILR[ ]  ATRIAL FIBRILLATION[ ] Paroxysmal[ ] Permanent[ ] CHADS2-[  ]  GHASSAN[ ] CKD1[ ] CKD2[ ] CKD3[ ] CKD4[ ] ESRD[ ]  COPD[ ] HTN[ ]   DM[ ] Type1[ ] Type 2[ ]   CVA[ ] Paresis[ ]    AMBULATION: Assisted[ ] Cane/walker[ ] Independent[ ]    MEDICATIONS  (STANDING):  aMIOdarone    Tablet 200 milliGRAM(s) Oral daily  ascorbic acid 500 milliGRAM(s) Oral daily  aspirin  chewable 81 milliGRAM(s) Oral daily  atorvastatin 80 milliGRAM(s) Oral at bedtime  buDESOnide    Inhalation Suspension 0.5 milliGRAM(s) Inhalation two times a day  chlorhexidine 2% Cloths 1 Application(s) Topical daily  chlorhexidine 4% Liquid 1 Application(s) Topical <User Schedule>  dextrose 50% Injectable 50 milliLiter(s) IV Push every 15 minutes  epoetin ignacia (EPOGEN) Injectable 37863 Unit(s) IV Push <User Schedule>  ferrous    sulfate Liquid 300 milliGRAM(s) Enteral Tube daily  heparin  Infusion 1300 Unit(s)/Hr (13.5 mL/Hr) IV Continuous <Continuous>  insulin lispro (ADMELOG) corrective regimen sliding scale   SubCutaneous every 6 hours  melatonin 5 milliGRAM(s) Oral at bedtime  meropenem  IVPB 500 milliGRAM(s) IV Intermittent every 24 hours  methocarbamol 500 milliGRAM(s) Oral every 8 hours  metoclopramide Injectable 5 milliGRAM(s) IV Push every 8 hours  metoprolol tartrate 25 milliGRAM(s) Oral two times a day  mirtazapine 7.5 milliGRAM(s) Oral at bedtime  Nephro-elicia 1 Tablet(s) Oral daily  pantoprazole  Injectable 40 milliGRAM(s) IV Push daily  sodium chloride 0.65% Nasal 1 Spray(s) Both Nostrils every 12 hours  sodium chloride 0.9% for Nebulization 3 milliLiter(s) Nebulizer every 6 hours  sodium chloride 0.9%. 1000 milliLiter(s) (10 mL/Hr) IV Continuous <Continuous>  sodium zirconium cyclosilicate 10 Gram(s) Oral <User Schedule>  traZODone 100 milliGRAM(s) Oral at bedtime    MEDICATIONS  (PRN):  acetaminophen     Tablet .. 650 milliGRAM(s) Oral every 6 hours PRN Mild Pain (1 - 3)  lidocaine/prilocaine Cream 1 Application(s) Topical daily PRN pre-HD  sodium chloride 0.9% lock flush 10 milliLiter(s) IV Push every 1 hour PRN Pre/post blood products, medications, blood draw, and to maintain line patency            REVIEW OF SYSTEMS:  Constitutional: [ ] fever, [ ]weight loss,  [ ]fatigue [ ]weight gain  Eyes: [ ] visual changes  Respiratory: [ ]shortness of breath;  [ ] cough, [ ]wheezing, [ ]chills, [ ]hemoptysis  Cardiovascular: [ ] chest pain, [ ]palpitations, [ ]dizziness,  [ ]leg swelling[ ]orthopnea[ ]PND  Gastrointestinal: [ ] abdominal pain, [ ]nausea, [ ]vomiting,  [ ]diarrhea [ ]Constipation [ ]Melena  Genitourinary: [ ] dysuria, [ ] hematuria [ ]Vigil  Neurologic: [ ] headaches [ ] tremors[ ]weakness [ ]Paralysis Right[ ] Left[ ]  Skin: [ ] itching, [ ]burning, [ ] rashes  Endocrine: [ ] heat or cold intolerance  Musculoskeletal: [ ] joint pain or swelling; [ ] muscle, back, or extremity pain  Psychiatric: [ ] depression, [ ]anxiety, [ ]mood swings, or [ ]difficulty sleeping  Hematologic: [ ] easy bruising, [ ] bleeding gums    [ ] All remaining systems negative except as per above.   [ ]Unable to obtain.  [x] No change in ROS since admission      Vital Signs Last 24 Hrs  T(C): 36.3 (03 Mar 2025 12:00), Max: 36.7 (02 Mar 2025 16:00)  T(F): 97.4 (03 Mar 2025 12:00), Max: 98 (02 Mar 2025 16:00)  HR: 77 (03 Mar 2025 14:00) (57 - 123)  BP: 138/59 (03 Mar 2025 14:00) (101/47 - 160/68)  BP(mean): 85 (03 Mar 2025 14:00) (57 - 98)  RR: 12 (03 Mar 2025 14:00) (9 - 45)  SpO2: 100% (03 Mar 2025 14:00) (82% - 100%)    Parameters below as of 03 Mar 2025 12:30  Patient On (Oxygen Delivery Method): tracheostomy collar    O2 Concentration (%): 40  I&O's Summary    02 Mar 2025 07:01  -  03 Mar 2025 07:00  --------------------------------------------------------  IN: 929 mL / OUT: 2 mL / NET: 927 mL    03 Mar 2025 07:01  -  03 Mar 2025 15:04  --------------------------------------------------------  IN: 226 mL / OUT: 1 mL / NET: 225 mL        PHYSICAL EXAM:  General: No acute distress BMI-  HEENT: EOMI, PERRL  Neck: Supple, [ ] JVD  Lungs: Equal air entry bilaterally; [ ] rales [ ] wheezing [ ] rhonchi  Heart: Regular rate and rhythm; [x ] murmur   2/6 [ x] systolic [ ] diastolic [ ] radiation[ ] rubs [ ]  gallops  Abdomen: Nontender, bowel sounds present  Extremities: No clubbing, cyanosis, [ ] edema [ ]Pulses  equal and intact  Nervous system:  Alert & Oriented X3, no focal deficits  Psychiatric: Normal affect  Skin: No rashes or lesions    LABS:  03-03    134[L]  |  95[L]  |  33[H]  ----------------------------<  91  4.5   |  27  |  5.47[H]    Ca    9.0      03 Mar 2025 03:58  Phos  5.0     03-03  Mg     2.5     03-03    TPro  6.1  /  Alb  2.7[L]  /  TBili  0.3  /  DBili  x   /  AST  16  /  ALT  12  /  AlkPhos  82  03-03    Creatinine Trend: 5.47<--, 4.89<--, 4.32<--, 3.15<--, 4.51<--, 3.45<--                        7.3    7.87  )-----------( 236      ( 03 Mar 2025 03:58 )             23.5     PT/INR - ( 02 Mar 2025 00:43 )   PT: 13.9 sec;   INR: 1.21 ratio         PTT - ( 03 Mar 2025 03:58 )  PTT:49.3 sec

## 2025-03-03 NOTE — CHART NOTE - NSCHARTNOTEFT_GEN_A_CORE
NUTRITION FOLLOW UP NOTE    PATIENT SEEN FOR: ICU follow up - clarification of enteral nutrition formula    SOURCE: [] Patient  [x] Current Medical Record  [x] RN  [] Family/support person at bedside  [x] Patient unavailable/inappropriate  [x] Other: PA    CHART REVIEWED/EVENTS NOTED.  [] No changes to nutrition care plan to note  [x] Nutrition Status:  -s/p CABG x 3 on 24  -25 tracheostomy placement. tolerating tracheostomy collar  -s/p sacral debridement on 25. wound vac placed on 25  -ESRD - tolerating iHD, on HD PTA.  -Ongoing nausea/vomiting. 3/2 abdominal x-ray showed no evidence of bowel obstruction. amylase and lipase came back WDL.  -s/p Swallow Bedside Assessment 3/2 with speech language pathologist recommendations to continue NPO with non-oral nutrition. May undergo FEES for further assessment.    DIET ORDER:   Diet, NPO with Tube Feed:   Tube Feeding Modality: Nasogastric  Isis Parenting Peptide 1.5 ([id number])  Total Volume for 24 Hours (mL): 840  Continuous  Starting Tube Feed Rate {mL per Hour}: 20  Increase Tube Feed Rate by (mL): 10     Every hour  Until Goal Tube Feed Rate (mL per Hour): 70  Tube Feed Duration (in Hours): 12  Tube Feed Start Time: 18:00  Tube Feed Stop Time: 06:00    Start Time: 23:00 (25)      CURRENT DIET ORDER IS:  [x] Appropriate:  [] Inadequate:  [] Other:    NUTRITION INTAKE/PROVISION:  [] PO:  [x] Enteral Nutrition:  -Pt previously ordered for Vital 1.5, on 3/2 formula changed to "[employer]" formula with no caloric density specified.   -RD spoke to PA and recommended order be changed to Isis Parenting Peptide @ 70ml/hr x 24hr  EN Order Provides:  1680 ml formula, 2520kcal, 124g protein, 1284ml free water; meets 32.3 kcal/kg and 1.59 g/kg protein based on IBW 172lb/78kg.    Current Pump Rate: off at time of visit  5-Day Average Enteral Provision per RN flowsheet: 520 ml, 780 kcal   [] Parenteral Nutrition:    ANTHROPOMETRICS:  Drug Dosing Weight  Height (cm): 180.3 (30 Dec 2024 12:01)  Weight (kg): 85.1 (30 Dec 2024 12:01)  BMI (kg/m2): 26.2 (30 Dec 2024 12:01)  Weights:   Daily Weight in k.5 (), 75.4 (), 80 (), 71.8 (), 85.3 (), 82.5 (), 81.6 (), 81.8 (), 72 (), 72.8 (), 78.3 (), 83 (), 73 (), 80.6 (), 84.4 (), 78 (), 74.7 (), 85.7 (), 85.9 (), 80.8 (), 87 (), 86 (), 91.2 (), 97.1 (), 78.7 (), 88 (), 83.4 (), 77.7 (), 81 (), 88.7 (), 83.3 (), 97.6 (01-10), 79.6 (), 93.4 (), 85 (), 90 (), 84 (), 77.5 (), 87.8 (), 88.3 (), 85.3 (), 79.7 (), 81.3 (), 82.6 ().  Weight fluctuations likely in setting of fluid shifts and/or scale inaccuracies, Inadequate energy intake. RD to continue to monitor weight trends as available/able.     NUTRITIONALLY PERTINENT MEDICATIONS:  MEDICATIONS  (STANDING):  aMIOdarone    Tablet 200 milliGRAM(s) Oral daily  ascorbic acid 500 milliGRAM(s) Oral daily  atorvastatin 80 milliGRAM(s) Oral at bedtime  dextrose 50% Injectable 50 milliLiter(s) IV Push every 15 minutes  ferrous    sulfate Liquid 300 milliGRAM(s) Enteral Tube daily  insulin lispro (ADMELOG) corrective regimen sliding scale   SubCutaneous every 6 hours  meropenem  IVPB 500 milliGRAM(s) IV Intermittent every 24 hours  methocarbamol 500 milliGRAM(s) Oral every 8 hours  metoclopramide Injectable 5 milliGRAM(s) IV Push every 8 hours  metoprolol tartrate 25 milliGRAM(s) Oral two times a day  mirtazapine 7.5 milliGRAM(s) Oral at bedtime  Nephro-elicia 1 Tablet(s) Oral daily  pantoprazole  Injectable 40 milliGRAM(s) IV Push daily  sodium chloride 0.9%. 1000 milliLiter(s) (10 mL/Hr) IV Continuous <Continuous>  sodium zirconium cyclosilicate 10 Gram(s) Oral <User Schedule>  traZODone 100 milliGRAM(s) Oral at bedtime       NUTRITIONALLY PERTINENT LABS:   Na134 mmol/L[L] Glu 91 mg/dL K+ 4.5 mmol/L Cr  5.47 mg/dL[H] BUN 33 mg/dL[H]  Phos 5.0 mg/dL[H]  Alb 2.7 g/dL[L] ALT 12 U/L AST 16 U/L Alkaline Phosphatase 82 U/L    A1C with Estimated Average Glucose Result: 5.6 % (24 @ 06:50)          Finger Sticks:  POCT Blood Glucose.: 105 mg/dL ( @ 06:40)  POCT Blood Glucose.: 109 mg/dL ( @ 18:22)  POCT Blood Glucose.: 159 mg/dL ( @ 12:20)      NUTRITIONALLY PERTINENT MEDICATIONS/LABS:  [x] Reviewed  [x] Relevant notes on medications/labs:  -insulin for glycemic control, Finger Sticks acceptable  -remeron which can stimulate appetite  -lokelma 3x/ week on non-HD days  -elevated phosphorus today noted, potassium WNL    EDEMA:  [x] Reviewed  [x] Relevant notes: No noted edema as per flowsheets.     GI/ I&O:  [x] Reviewed  [x] Relevant notes: Nausea/vomiting x a few days, cause still unclear. Per RN patient did not complain of nausea thus far today. Last BM 3/2 per flowsheets.  [] Other:    SKIN:   [] No pressure injuries documented, per nursing flowsheet  [x] Pressure injury previously noted  -per flowsheets : sacrum stage 4 pressure injury   [] Change in pressure injury documentation:  [X] Other:  -Per Wound Care : "Sacral/buttocks wound 2/2 skin failure, Rt upper back injury now resolved"  -Per Wound Care : "Sacral region/Bilateral buttocks wound"  -midsternal surgical incision from CABG     ESTIMATED NEEDS:  [x] No change:  [] Updated:  Energy:  2625-6371 kcal/day (30-35 kcal/kg)  Protein:  109-140 g/day (1.4-1.8 g/kg)  Fluid:   ml/day or [x] defer to team  Based on: IBW 172lb/78kg    NUTRITION DIAGNOSIS:  [X] Prior Dx: Inadequate energy intake, Increased Nutrient Needs (protein-energy, micronutrients)  [] New Dx:    EDUCATION:  [] Yes:  [x] Not appropriate/warranted    NUTRITION CARE PLAN:  1. Diet: Continue (as just updated by PA) order of Isis Parenting Peptide 1.5 @ 70ml/hr x 24hr. Provides 1680 ml formula, 2520kcal, 124g protein, 1284ml free water; meets 32.3 kcal/kg and 1.59 g/kg protein based on IBW 172lb/78kg.  2. Multivitamin/mineral supplementation: continue nephro-elicia to promote wound healing, defer Vitamin C to medical team in setting of HD.  3. Monitor for malnutrition given Inadequate energy intake.    [] Achieved - Continue current nutrition intervention(s)  [] Current medical condition precludes nutrition intervention at this time.    MONITORING AND EVALUATION:   RD remains available upon request and will follow up per protocol.    Corinne Lima RDN, CDN - available via [place] TEAMS

## 2025-03-04 LAB
ALBUMIN SERPL ELPH-MCNC: 3 G/DL — LOW (ref 3.3–5)
ALP SERPL-CCNC: 85 U/L — SIGNIFICANT CHANGE UP (ref 40–120)
ALT FLD-CCNC: 12 U/L — SIGNIFICANT CHANGE UP (ref 10–45)
ANION GAP SERPL CALC-SCNC: 14 MMOL/L — SIGNIFICANT CHANGE UP (ref 5–17)
APTT BLD: 110.8 SEC — HIGH (ref 24.5–35.6)
APTT BLD: 65.8 SEC — HIGH (ref 24.5–35.6)
APTT BLD: 67.5 SEC — HIGH (ref 24.5–35.6)
APTT BLD: 72.9 SEC — HIGH (ref 24.5–35.6)
AST SERPL-CCNC: 22 U/L — SIGNIFICANT CHANGE UP (ref 10–40)
BILIRUB SERPL-MCNC: 0.4 MG/DL — SIGNIFICANT CHANGE UP (ref 0.2–1.2)
BUN SERPL-MCNC: 21 MG/DL — SIGNIFICANT CHANGE UP (ref 7–23)
CHLORIDE SERPL-SCNC: 95 MMOL/L — LOW (ref 96–108)
CO2 SERPL-SCNC: 28 MMOL/L — SIGNIFICANT CHANGE UP (ref 22–31)
CREAT SERPL-MCNC: 3.99 MG/DL — HIGH (ref 0.5–1.3)
EGFR: 17 ML/MIN/1.73M2 — LOW
EGFR: 17 ML/MIN/1.73M2 — LOW
FIBRINOGEN PPP-MCNC: 265 MG/DL — SIGNIFICANT CHANGE UP (ref 200–445)
GLUCOSE SERPL-MCNC: 103 MG/DL — HIGH (ref 70–99)
HCT VFR BLD CALC: 26.4 % — LOW (ref 39–50)
HGB BLD-MCNC: 8.1 G/DL — LOW (ref 13–17)
INR BLD: 1.18 RATIO — HIGH (ref 0.85–1.16)
MAGNESIUM SERPL-MCNC: 2.3 MG/DL — SIGNIFICANT CHANGE UP (ref 1.6–2.6)
MCHC RBC-ENTMCNC: 30.7 G/DL — LOW (ref 32–36)
MCHC RBC-ENTMCNC: 30.7 PG — SIGNIFICANT CHANGE UP (ref 27–34)
NRBC BLD AUTO-RTO: 0 /100 WBCS — SIGNIFICANT CHANGE UP (ref 0–0)
PHOSPHATE SERPL-MCNC: 4 MG/DL — SIGNIFICANT CHANGE UP (ref 2.5–4.5)
PLATELET # BLD AUTO: 269 K/UL — SIGNIFICANT CHANGE UP (ref 150–400)
POTASSIUM SERPL-MCNC: 3.8 MMOL/L — SIGNIFICANT CHANGE UP (ref 3.5–5.3)
POTASSIUM SERPL-SCNC: 3.8 MMOL/L — SIGNIFICANT CHANGE UP (ref 3.5–5.3)
PROT SERPL-MCNC: 6.5 G/DL — SIGNIFICANT CHANGE UP (ref 6–8.3)
PROTHROM AB SERPL-ACNC: 13.4 SEC — SIGNIFICANT CHANGE UP (ref 9.9–13.4)
RBC # BLD: 2.64 M/UL — LOW (ref 4.2–5.8)
RBC # FLD: 20.5 % — HIGH (ref 10.3–14.5)
SODIUM SERPL-SCNC: 137 MMOL/L — SIGNIFICANT CHANGE UP (ref 135–145)
WBC # BLD: 7.42 K/UL — SIGNIFICANT CHANGE UP (ref 3.8–10.5)
WBC # FLD AUTO: 7.42 K/UL — SIGNIFICANT CHANGE UP (ref 3.8–10.5)

## 2025-03-04 PROCEDURE — 71045 X-RAY EXAM CHEST 1 VIEW: CPT | Mod: 26

## 2025-03-04 PROCEDURE — 76700 US EXAM ABDOM COMPLETE: CPT | Mod: 26

## 2025-03-04 PROCEDURE — 99232 SBSQ HOSP IP/OBS MODERATE 35: CPT

## 2025-03-04 PROCEDURE — G0545: CPT

## 2025-03-04 PROCEDURE — 99291 CRITICAL CARE FIRST HOUR: CPT

## 2025-03-04 RX ORDER — OXYCODONE HYDROCHLORIDE 30 MG/1
10 TABLET ORAL ONCE
Refills: 0 | Status: DISCONTINUED | OUTPATIENT
Start: 2025-03-04 | End: 2025-03-04

## 2025-03-04 RX ORDER — GABAPENTIN 400 MG/1
100 CAPSULE ORAL ONCE
Refills: 0 | Status: COMPLETED | OUTPATIENT
Start: 2025-03-04 | End: 2025-03-04

## 2025-03-04 RX ORDER — ACETAMINOPHEN 500 MG/5ML
1000 LIQUID (ML) ORAL ONCE
Refills: 0 | Status: COMPLETED | OUTPATIENT
Start: 2025-03-04 | End: 2025-03-04

## 2025-03-04 RX ADMIN — Medication 500 MILLIGRAM(S): at 11:57

## 2025-03-04 RX ADMIN — GABAPENTIN 100 MILLIGRAM(S): 400 CAPSULE ORAL at 01:39

## 2025-03-04 RX ADMIN — Medication 81 MILLIGRAM(S): at 11:57

## 2025-03-04 RX ADMIN — HEPARIN SODIUM 13.5 UNIT(S)/HR: 1000 INJECTION INTRAVENOUS; SUBCUTANEOUS at 00:55

## 2025-03-04 RX ADMIN — AMIODARONE HYDROCHLORIDE 200 MILLIGRAM(S): 50 INJECTION, SOLUTION INTRAVENOUS at 06:03

## 2025-03-04 RX ADMIN — HEPARIN SODIUM 13 UNIT(S)/HR: 1000 INJECTION INTRAVENOUS; SUBCUTANEOUS at 07:48

## 2025-03-04 RX ADMIN — Medication 400 MILLIGRAM(S): at 21:27

## 2025-03-04 RX ADMIN — BUDESONIDE 0.5 MILLIGRAM(S): 0.25 SUSPENSION RESPIRATORY (INHALATION) at 17:33

## 2025-03-04 RX ADMIN — Medication 3 MILLILITER(S): at 23:17

## 2025-03-04 RX ADMIN — MEROPENEM 100 MILLIGRAM(S): 1 INJECTION INTRAVENOUS at 23:12

## 2025-03-04 RX ADMIN — MIRTAZAPINE 7.5 MILLIGRAM(S): 30 TABLET, FILM COATED ORAL at 21:28

## 2025-03-04 RX ADMIN — METOPROLOL SUCCINATE 25 MILLIGRAM(S): 50 TABLET, EXTENDED RELEASE ORAL at 17:49

## 2025-03-04 RX ADMIN — Medication 5 MILLIGRAM(S): at 23:12

## 2025-03-04 RX ADMIN — SODIUM ZIRCONIUM CYCLOSILICATE 10 GRAM(S): 5 POWDER, FOR SUSPENSION ORAL at 06:03

## 2025-03-04 RX ADMIN — Medication 1 APPLICATION(S): at 21:21

## 2025-03-04 RX ADMIN — Medication 3 MILLILITER(S): at 05:07

## 2025-03-04 RX ADMIN — Medication 1 TABLET(S): at 11:57

## 2025-03-04 RX ADMIN — OXYCODONE HYDROCHLORIDE 10 MILLIGRAM(S): 30 TABLET ORAL at 12:53

## 2025-03-04 RX ADMIN — METHOCARBAMOL 500 MILLIGRAM(S): 500 TABLET, FILM COATED ORAL at 06:03

## 2025-03-04 RX ADMIN — Medication 1 SPRAY(S): at 06:07

## 2025-03-04 RX ADMIN — Medication 1000 MILLIGRAM(S): at 21:42

## 2025-03-04 RX ADMIN — Medication 40 MILLIGRAM(S): at 11:57

## 2025-03-04 RX ADMIN — Medication 300 MILLIGRAM(S): at 11:57

## 2025-03-04 RX ADMIN — Medication 1 APPLICATION(S): at 05:00

## 2025-03-04 RX ADMIN — BUDESONIDE 0.5 MILLIGRAM(S): 0.25 SUSPENSION RESPIRATORY (INHALATION) at 05:07

## 2025-03-04 RX ADMIN — METHOCARBAMOL 500 MILLIGRAM(S): 500 TABLET, FILM COATED ORAL at 14:12

## 2025-03-04 RX ADMIN — ATORVASTATIN CALCIUM 80 MILLIGRAM(S): 80 TABLET, FILM COATED ORAL at 21:28

## 2025-03-04 RX ADMIN — METHOCARBAMOL 500 MILLIGRAM(S): 500 TABLET, FILM COATED ORAL at 21:27

## 2025-03-04 RX ADMIN — Medication 100 MILLIGRAM(S): at 23:12

## 2025-03-04 RX ADMIN — Medication 3 MILLILITER(S): at 17:33

## 2025-03-04 RX ADMIN — OXYCODONE HYDROCHLORIDE 10 MILLIGRAM(S): 30 TABLET ORAL at 12:23

## 2025-03-04 NOTE — PROGRESS NOTE ADULT - SUBJECTIVE AND OBJECTIVE BOX
Brief history :  55 yo M with multiple medical problems including CAD s/p PCI in 2024 s/p CABG x 3 on 24  1: Intubated for hypoxia & left lung white out s/p awake bronch with removal of copious mucopurulent secretions  : s/p IABP insertion, sepsis/septic shock due to E coli   ESBL pneumonia, collapsed Left Lung, s/p multiple bronch    tracheostomy tube placement    VRE E. Faecium Bcx   +HSV PCR BAL   tissue cx from back abscess - Serratia/MRSA  - - intermittent bradycardia/junctional episodes with hypotension  2/3: off , off theophylline. iHD 1L UF  : bronch for Left lung collapse wound vac, 2decho w/ moderate pericardial effusion - similar as before, US abd: small - moderate ascites - monitor at this time.  Wound vac placed for sacral wounds  : iHD-1L UF  : bronch today off positive pressure   : concern for left food drop   2/10 : no acute issues - CXR shows improving left sided aeration, pt subjectively reports having difficulty while lying flat  : s/p Paracentesis 3.5 L removed, leukocytosis   : Ascites fluid PMN < 250 (less likely SBP)   sniff test yesterday showed paradoxical diaphragmatic motion    : mucus plugging but able to clear airway with aggressive pulmonary toileting.   : no vent requirement overnight, able to mobilize secretion with pulm toileting  hyperkalemia post HD - workup for possible recirculation underway   : On TC X 48 hours - ambulating well, no mucus plugging events  : HFTC x24hrs (40L 30%)  : iHD 1.5L UF  : iHD 2L  : trach downsized to 7, bronch  - cultures growing ESBL E Coli     ICU Vital Signs Last 24 Hrs  T(C): 35.9 (25 @ 04:00), Max: 37.1 (25 @ 20:00)  T(F): 96.7 (25 @ 04:00), Max: 98.8 (25 @ 20:00)  HR: 60 (25 @ 06:00) (58 - 107)  BP: 108/45 (25 @ 06:00) (90/55 - 138/59)  BP(mean): 65 (25 @ 06:00) (57 - 87)  ABP: --  ABP(mean): --  RR: 12 (25 @ 06:00) (11 - 23)  SpO2: 100% (25 @ 06:00) (74% - 100%)    I&O's Summary    02 Mar 2025 07:01  -  03 Mar 2025 07:00  --------------------------------------------------------  IN: 929 mL / OUT: 2 mL / NET: 927 mL    03 Mar 2025 07:01  -  04 Mar 2025 06:51  --------------------------------------------------------  IN: 1168.5 mL / OUT: 2001 mL / NET: -832.5 mL    ABG - ( 02 Mar 2025 14:19 )  pH: 7.44  /  pCO2: 42    /  pO2: 96    / HCO3: 28    / Base Excess: 4.0   /  SaO2: 98.1                          8.1    7.42  )-----------( 269      ( 04 Mar 2025 00:29 )             26.4     04 Mar 2025 00:29    137    |  95     |  21     ----------------------------<  103    3.8     |  28     |  3.99     Ca    8.9        04 Mar 2025 00:29  Phos  4.0       04 Mar 2025 00:29  Mg     2.3       04 Mar 2025 00:29    TPro  6.5    /  Alb  3.0    /  TBili  0.4    /  DBili  x      /  AST  22     /  ALT  12     /  AlkPhos  85     04 Mar 2025 00:29    PTT - ( 04 Mar 2025 00:29 )  PTT:67.5 sec    MEDICATIONS  (STANDING):  aMIOdarone    Tablet 200 milliGRAM(s) Oral daily  ascorbic acid 500 milliGRAM(s) Oral daily  aspirin  chewable 81 milliGRAM(s) Oral daily  atorvastatin 80 milliGRAM(s) Oral at bedtime  buDESOnide    Inhalation Suspension 0.5 milliGRAM(s) Inhalation two times a day  chlorhexidine 2% Cloths 1 Application(s) Topical daily  chlorhexidine 4% Liquid 1 Application(s) Topical <User Schedule>  dextrose 50% Injectable 50 milliLiter(s) IV Push every 15 minutes  epoetin ignacia (EPOGEN) Injectable 26785 Unit(s) IV Push <User Schedule>  ferrous    sulfate Liquid 300 milliGRAM(s) Enteral Tube daily  heparin  Infusion 1300 Unit(s)/Hr IV Continuous <Continuous>  insulin lispro (ADMELOG) corrective regimen sliding scale   SubCutaneous every 6 hours  melatonin 5 milliGRAM(s) Oral at bedtime  meropenem  IVPB 500 milliGRAM(s) IV Intermittent every 24 hours  methocarbamol 500 milliGRAM(s) Oral every 8 hours  metoprolol tartrate 25 milliGRAM(s) Oral two times a day  mirtazapine 7.5 milliGRAM(s) Oral at bedtime  Nephro-elicia 1 Tablet(s) Oral daily  pantoprazole  Injectable 40 milliGRAM(s) IV Push daily  sodium chloride 0.65% Nasal 1 Spray(s) Both Nostrils every 12 hours  sodium chloride 0.9% for Nebulization 3 milliLiter(s) Nebulizer every 6 hours  sodium chloride 0.9%. 1000 milliLiter(s) IV Continuous <Continuous>  sodium zirconium cyclosilicate 10 Gram(s) Oral <User Schedule>  traZODone 100 milliGRAM(s) Oral at bedtime    Home Medications:  aspirin 81 mg oral delayed release tablet: 1 tab(s) orally once a day (23 Dec 2024 16:58)  calcium acetate 667 mg oral tablet: 1 tab(s) orally 3 times a day (23 Dec 2024 16:58)  carvedilol 12.5 mg oral tablet: 1 tab(s) orally every 12 hours (23 Dec 2024 16:56)  clopidogrel 75 mg oral tablet: 1 tab(s) orally once a day (23 Dec 2024 16:56)  furosemide 20 mg oral tablet: 1 tab(s) orally once a day (23 Dec 2024 16:58)  hydrALAZINE 25 mg oral tablet: 1 tab(s) orally 3 times a day (23 Dec 2024 16:58)  lisinopril 40 mg oral tablet: 1 tab(s) orally once a day (23 Dec 2024 16:58)  lovastatin 10 mg oral tablet: 1 tab(s) orally once a day (23 Dec 2024 16:56)  NIFEdipine 90 mg oral tablet, extended release: 1 tab(s) orally once a day (23 Dec 2024 16:58)  Emani-Elicia oral tablet: 1 tab(s) orally once a day (23 Dec 2024 16:58)  traZODone 100 mg oral tablet: 1 tab(s) orally once a day (at bedtime) (23 Dec 2024 16:56)    PHYSICAL EXAM:  Gen : no acute distress  Neck: + trach   Respiratory: decreased in the bases  Cardiovascular: AFlutter  Gastrointestinal: distended, soft   Extremities: No peripheral edema  Vascular: 2+ peripheral pulses    S/p CABG  Respiratory failure due to recurrent left lung plugging now s/p trach  ESRD on HD  Post op AFib   History of RV dysfunction   h/o Recurrent ascites   Post operative pain   Sacral decub   Recurrent ascites likely from Chronic RV failure - s/p paracentesis   Left Diaphragm dysfunction     s/p CABG in setting of biV dysfunction.  Recovered from surgery current active issue has been recurrent left lung mucus plugging.  Pt also has left diaphragm dysfunction  s/p trach for secretion management.    Aggressive pulm toileting, chest percussion, suction as needed  Continue Trach collar wean oxygen as tolerated  Short course of antibiotics to cover for tracheobronchitis   HD per renal  History of Ascites - abdominal exam stable, no fluid shift or increased girth, no acute indication for paracenthesis  PT, walking.   Minimize labs  Continue Fe/Epo - anemia management  s/p FEES noted to have extensive edema above vocal cords -  Eval for supraglottic edema  - start budesonide nebs, improved phonation, continue speech therapy.         Critical care time spent    min       Care plan discussed with the ICU care team.   Patient remains critical, at risk for life threatening decompensation.    I have spent 30 minutes providing critical care management to this patient.    By signing my name below, I, Baldemar Hernandez, attest that this documentation has been prepared under the direction and in the presence of Herminio Mendez MD.   Electronically signed: Baldemar Hernandez, 25 @ 06:51    I, Herminio Mendez, personally performed the services described in this documentation. All medical record entries made by the scribe were at my direction and in my presence. I have reviewed the chart and agree that the record reflects my personal performance and is accurate and complete  Electronically signed: Herminio Mendez MD.  Brief history :  55 yo M with multiple medical problems including CAD s/p PCI in 2024 s/p CABG x 3 on 24  1: Intubated for hypoxia & left lung white out s/p awake bronch with removal of copious mucopurulent secretions  : s/p IABP insertion, sepsis/septic shock due to E coli   ESBL pneumonia, collapsed Left Lung, s/p multiple bronch    tracheostomy tube placement    VRE E. Faecium Bcx   +HSV PCR BAL   tissue cx from back abscess - Serratia/MRSA  - - intermittent bradycardia/junctional episodes with hypotension  2/3: off , off theophylline. iHD 1L UF  : bronch for Left lung collapse wound vac, 2decho w/ moderate pericardial effusion - similar as before, US abd: small - moderate ascites - monitor at this time.  Wound vac placed for sacral wounds  : iHD-1L UF  : bronch today off positive pressure   : concern for left food drop   2/10 : no acute issues - CXR shows improving left sided aeration, pt subjectively reports having difficulty while lying flat  : s/p Paracentesis 3.5 L removed, leukocytosis   : Ascites fluid PMN < 250 (less likely SBP)   sniff test yesterday showed paradoxical diaphragmatic motion    : mucus plugging but able to clear airway with aggressive pulmonary toileting.   : no vent requirement overnight, able to mobilize secretion with pulm toileting  hyperkalemia post HD - workup for possible recirculation underway   : On TC X 48 hours - ambulating well, no mucus plugging events  : HFTC x24hrs (40L 30%)  : iHD 1.5L UF  : iHD 2L  : trach downsized to 7, bronch  - cultures growing ESBL E Coli     ICU Vital Signs Last 24 Hrs  T(C): 35.9 (25 @ 04:00), Max: 37.1 (25 @ 20:00)  T(F): 96.7 (25 @ 04:00), Max: 98.8 (25 @ 20:00)  HR: 60 (25 @ 06:00) (58 - 107)  BP: 108/45 (25 @ 06:00) (90/55 - 138/59)  BP(mean): 65 (25 @ 06:00) (57 - 87)  ABP: --  ABP(mean): --  RR: 12 (25 @ 06:00) (11 - 23)  SpO2: 100% (25 @ 06:00) (74% - 100%)    I&O's Summary    02 Mar 2025 07:01  -  03 Mar 2025 07:00  --------------------------------------------------------  IN: 929 mL / OUT: 2 mL / NET: 927 mL    03 Mar 2025 07:01  -  04 Mar 2025 06:51  --------------------------------------------------------  IN: 1168.5 mL / OUT: 2001 mL / NET: -832.5 mL    ABG - ( 02 Mar 2025 14:19 )  pH: 7.44  /  pCO2: 42    /  pO2: 96    / HCO3: 28    / Base Excess: 4.0   /  SaO2: 98.1                          8.1    7.42  )-----------( 269      ( 04 Mar 2025 00:29 )             26.4     04 Mar 2025 00:29    137    |  95     |  21     ----------------------------<  103    3.8     |  28     |  3.99     Ca    8.9        04 Mar 2025 00:29  Phos  4.0       04 Mar 2025 00:29  Mg     2.3       04 Mar 2025 00:29    TPro  6.5    /  Alb  3.0    /  TBili  0.4    /  DBili  x      /  AST  22     /  ALT  12     /  AlkPhos  85     04 Mar 2025 00:29    PTT - ( 04 Mar 2025 00:29 )  PTT:67.5 sec    MEDICATIONS  (STANDING):  aMIOdarone    Tablet 200 milliGRAM(s) Oral daily  ascorbic acid 500 milliGRAM(s) Oral daily  aspirin  chewable 81 milliGRAM(s) Oral daily  atorvastatin 80 milliGRAM(s) Oral at bedtime  buDESOnide    Inhalation Suspension 0.5 milliGRAM(s) Inhalation two times a day  chlorhexidine 2% Cloths 1 Application(s) Topical daily  chlorhexidine 4% Liquid 1 Application(s) Topical <User Schedule>  dextrose 50% Injectable 50 milliLiter(s) IV Push every 15 minutes  epoetin ignacia (EPOGEN) Injectable 81429 Unit(s) IV Push <User Schedule>  ferrous    sulfate Liquid 300 milliGRAM(s) Enteral Tube daily  heparin  Infusion 1300 Unit(s)/Hr IV Continuous <Continuous>  insulin lispro (ADMELOG) corrective regimen sliding scale   SubCutaneous every 6 hours  melatonin 5 milliGRAM(s) Oral at bedtime  meropenem  IVPB 500 milliGRAM(s) IV Intermittent every 24 hours  methocarbamol 500 milliGRAM(s) Oral every 8 hours  metoprolol tartrate 25 milliGRAM(s) Oral two times a day  mirtazapine 7.5 milliGRAM(s) Oral at bedtime  Nephro-elicia 1 Tablet(s) Oral daily  pantoprazole  Injectable 40 milliGRAM(s) IV Push daily  sodium chloride 0.65% Nasal 1 Spray(s) Both Nostrils every 12 hours  sodium chloride 0.9% for Nebulization 3 milliLiter(s) Nebulizer every 6 hours  sodium chloride 0.9%. 1000 milliLiter(s) IV Continuous <Continuous>  sodium zirconium cyclosilicate 10 Gram(s) Oral <User Schedule>  traZODone 100 milliGRAM(s) Oral at bedtime    Home Medications:  aspirin 81 mg oral delayed release tablet: 1 tab(s) orally once a day (23 Dec 2024 16:58)  calcium acetate 667 mg oral tablet: 1 tab(s) orally 3 times a day (23 Dec 2024 16:58)  carvedilol 12.5 mg oral tablet: 1 tab(s) orally every 12 hours (23 Dec 2024 16:56)  clopidogrel 75 mg oral tablet: 1 tab(s) orally once a day (23 Dec 2024 16:56)  furosemide 20 mg oral tablet: 1 tab(s) orally once a day (23 Dec 2024 16:58)  hydrALAZINE 25 mg oral tablet: 1 tab(s) orally 3 times a day (23 Dec 2024 16:58)  lisinopril 40 mg oral tablet: 1 tab(s) orally once a day (23 Dec 2024 16:58)  lovastatin 10 mg oral tablet: 1 tab(s) orally once a day (23 Dec 2024 16:56)  NIFEdipine 90 mg oral tablet, extended release: 1 tab(s) orally once a day (23 Dec 2024 16:58)  Emani-Elicia oral tablet: 1 tab(s) orally once a day (23 Dec 2024 16:58)  traZODone 100 mg oral tablet: 1 tab(s) orally once a day (at bedtime) (23 Dec 2024 16:56)    PHYSICAL EXAM:  Gen : no acute distress  Neck: + trach   Respiratory: decreased in the bases  Cardiovascular: AFlutter  Gastrointestinal: distended, soft   Extremities: No peripheral edema  Vascular: 2+ peripheral pulses    S/p CABG  Respiratory failure due to recurrent left lung plugging now s/p trach  ESRD on HD  Post op AFib   History of RV dysfunction   h/o Recurrent ascites   Post operative pain   Sacral decub   Recurrent ascites likely from Chronic RV failure - s/p paracentesis   Left Diaphragm dysfunction     s/p CABG in setting of biV dysfunction.  Recovered from surgery current active issue has been recurrent left lung mucus plugging.  Pt also has left diaphragm dysfunction  s/p trach for secretion management.    Aggressive pulm toileting, chest percussion, suction as needed  Continue Trach collar wean oxygen as tolerated  Short course of antibiotics to cover for tracheobronchitis   HD per renal  History of Ascites - abdominal exam stable, no fluid shift or increased girth, no acute indication for paracentesis  PT, walking.   Minimize labs  Continue Fe/Epo - anemia management  s/p FEES noted to have extensive edema above vocal cords -  Eval for supraglottic edema  - ENT follow up prior to trach decannulation       Critical care time spent 31 min       Care plan discussed with the ICU care team.   Patient remains critical, at risk for life threatening decompensation.    I have spent 30 minutes providing critical care management to this patient.    By signing my name below, I, Baldemar Hernandez, attest that this documentation has been prepared under the direction and in the presence of Herminio Mendez MD.   Electronically signed: Baldemar Hernandez, 25 @ 06:51    I, Herminio Mendez, personally performed the services described in this documentation. All medical record entries made by the scribe were at my direction and in my presence. I have reviewed the chart and agree that the record reflects my personal performance and is accurate and complete  Electronically signed: Herminio Mendez MD.

## 2025-03-04 NOTE — PROGRESS NOTE ADULT - ASSESSMENT
54M with PMH of HTN, HLD, ESRD on HD MWF via LUE AVF, ascites (requiring repeat paracenteses q4-6wks), NICM with moderate LV dysfunction w/ EF 35-40%(2023) and CAD s/p atherectomy + SALLIE to D1(4/11/2024) presents to [facility] transferred from [facility] for CABG eval  Nephro on board for HD needs.    ESRD on HD   Nephrologist Dr. Bailey  [facility] Schedule / Access:  LUE AVF  Center: [facility]  Pt s/p HD on 2/19 and 2/21.  s/p HD 2/24 uf 1.5L  S/p HD on 02/26 2 L UF and 02/28 2 L UF again   S/p HD on 03/03  HD tomm  Keep MAP>65  Renal Diet  HD consent in chart     Hyper/Hypokalemia  Pt intermittently Hyperkalemia  HD as scheduled  now improved  Low K diet.  C/W Lokelma 10gm on nonHD days.  Monitor K.    HTN  BP fluctuating; controlled.  UF w/ HD as BP permits.  Low sodium diet.  Management per ICU  Monitor BP     CKD MBD  Check PTH   Low PO4 diet.  Not on binder.  Monitor Ca , PO4 daily     Anemia  CRISTIANE with HD  Repeat iron studies.  Transfuse if hgb <7    CAD with multivessel disease  CTICU following  s/p CABG 12/30    Hypoxic respiratory failure requiring Trach  Per surgery  S/p bronchoscopy, pulm following

## 2025-03-04 NOTE — CHART NOTE - NSCHARTNOTEFT_GEN_A_CORE
Chart reviewed and events-to-date noted. FEES 3/3 notable for poor secretion management evidenced by baseline pooling, laryngeal penetration, and aspiration of secretions; unable to clear 2/2 weak cough likely iso trach. B/l vocal cords appeared to be adducted at midline--slight abduction of posterior vocal cords noted during effortful/deep inhalation. Respiratory therapists and MD Mendez called to bedside during FEES to assess. PO trials subsequently deferred per MD and ENT consult requested. Per d/w ENT MYRA Cuevas, limited vocal cord abduction vs. questionable vocal cord paresis--no plan for ENT intervention at this time, re-consult when considering decannulation. No PMV per MD; additionally, Pt is not a candidate for PMV given limited vocal cord abduction vs. vocal cord paresis. Pt is not a candidate for swallow tx given poor secretion management and vocal cord dysfunction. Pt is able to functionally communicate and participate in daily care by communicating verbally (voicing over trach) and utilizing white board as needed. Pt has no skilled ST needs, this service will sign off at this time. Re-consult as needed pending improvement in vocal cord movement and secretion management. SLP d/w MYRA Duarte who is in agreement and will discuss w/ MD.    Pooja Anthony MA, CCC-SLP  ext [phone] or TEAMS as needed

## 2025-03-04 NOTE — PROGRESS NOTE ADULT - ASSESSMENT
54M with PMH of HTN, HLD, ESRD on HD MWF via LUE AVF, ascites (requiring repeat paracenteses q4-6wks), NICM with moderate LV dysfunction w/ EF 35-40%() and CAD s/p atherectomy + SALLIE to D1(2024) presents to [facility] transferred from [facility].  Cardiac cath which showed pLAD 70% ISR, mLCx 70%, D1 severe dz.   Patient now transferred to [facility] CTS Dr. Rivers for CABG eval      # CAD s/p NSTEMI  Hx CAD s/p PCI LAD   Presented with ADHF elevated troponins  Positive NST1-Cath- pLAD 70% ISR, mLCx 70%, D1 severe dz.   TTE EF 35% Severe TRWas on Plavix-p2y12- 321 been off Plavix since -POD#1-C3L free LIMA-LAD; SVG-Diag; VRM-sfdkBM-Qglinkpfm on  Sinus rhythm,Amio for AF prophylaxis   Atrial Flutter on TC during day Ambulating  -Increased congestion on CXR had HD yesterday remains in AFl   -Atrial Flutter HFTC 40L/30% BP stable     -Atrial Flutter tolerating TC for HD MWF-consider DOAC   OOB Abd US moderate 4 quad ascites noted remains in Atrial Flutter~~70's   TC Atrial Flutter  -s/p Bronch/BAL remains in AFl  Trach downsized 7   -Tolerating TC AFl rate controlled On Heparin gtt transition to DOAC      # Acute on Chronic Systolic Heart Failure now improved  EF-35% ,severe TR  Had HD post op  GDMT post op  LVEF improved post bypass   -TTE EF 40%,trace effusion  ECG c/w pericarditis-was on colchicine   01/15-TTE EF 55%  -TTE EF 60%   -Normal LV systolic function Moderate pericardial effusion  -On TC-HF and Vent support at nights   02/10-Vent HS with T Collar trial Ambulated Remains in AF  Repeat TTE Normal LV Systolic function Small Pericardial effusion decreased from 2/3        Vascular evaluated  AVGraft /?steal causing RV failure-'' Very low suspicion of steal Sd and AVF causing current clinical state. ''   VA Duplex Hemodialysis Access, Left (25 @ 13:35) >   Arterial flow volume of 1301 mL/m, and the venous flow volume of  1492 mL/m are consistent with a normally functioning dialysis access  fistula.      # Ascites  Hx chronic ascites  Has drainage q4-6 weeks  Last drained -4600mL  ? ascites related to severe TR  Had ldxxstupokhl-Pzsmbzd-um growth   CT Abdomen 01/10-Large volume ascites-  US abdomen--Small to moderate volume abdominal/pelvic ascites  US abdomen  Moderate ascites  US Abdomen -Moderate 4 quad ascites    # ESRD  Now on  HD-MWF

## 2025-03-04 NOTE — PROGRESS NOTE ADULT - SUBJECTIVE AND OBJECTIVE BOX
[facility]    Dr. Nica Styles     Office [phone] (9 am to 5 pm)  Service: [phone] (5pm to 9am)  Also Available on TEAMS      RENAL PROGRESS NOTE: DATE OF SERVICE 03-04-25 @ 11:08    Patient is a 54y old  Male who presents with a chief complaint of CAD s/p CABG (04 Mar 2025 06:22)      Patient seen and examined at bedside. No chest pain/sob    VITALS:  T(F): 97.4 (03-04-25 @ 08:00), Max: 98.8 (03-03-25 @ 20:00)  HR: 61 (03-04-25 @ 10:00)  BP: 122/53 (03-04-25 @ 10:00)  RR: 12 (03-04-25 @ 10:00)  SpO2: 100% (03-04-25 @ 10:00)  Wt(kg): --    03-03 @ 07:01  -  03-04 @ 07:00  --------------------------------------------------------  IN: 1168.5 mL / OUT: 2001 mL / NET: -832.5 mL    03-04 @ 07:01  -  03-04 @ 11:08  --------------------------------------------------------  IN: 68 mL / OUT: 0 mL / NET: 68 mL          PHYSICAL EXAM:  Constitutional: NAD  Neck: No JVD  Respiratory: CTAB, no wheezes, rales or rhonchi  Cardiovascular: S1, S2, RRR  Gastrointestinal: BS+, soft, NT/ND  Extremities: No peripheral edema    Hospital Medications:   MEDICATIONS  (STANDING):  ascorbic acid 500 milliGRAM(s) Oral daily  aspirin  chewable 81 milliGRAM(s) Oral daily  atorvastatin 80 milliGRAM(s) Oral at bedtime  buDESOnide    Inhalation Suspension 0.5 milliGRAM(s) Inhalation two times a day  chlorhexidine 2% Cloths 1 Application(s) Topical daily  chlorhexidine 4% Liquid 1 Application(s) Topical <User Schedule>  dextrose 50% Injectable 50 milliLiter(s) IV Push every 15 minutes  epoetin ignacia (EPOGEN) Injectable 38029 Unit(s) IV Push <User Schedule>  ferrous    sulfate Liquid 300 milliGRAM(s) Enteral Tube daily  heparin  Infusion 1300 Unit(s)/Hr (13 mL/Hr) IV Continuous <Continuous>  insulin lispro (ADMELOG) corrective regimen sliding scale   SubCutaneous every 6 hours  melatonin 5 milliGRAM(s) Oral at bedtime  meropenem  IVPB 500 milliGRAM(s) IV Intermittent every 24 hours  methocarbamol 500 milliGRAM(s) Oral every 8 hours  metoprolol tartrate 25 milliGRAM(s) Oral two times a day  mirtazapine 7.5 milliGRAM(s) Oral at bedtime  Nephro-elicia 1 Tablet(s) Oral daily  pantoprazole  Injectable 40 milliGRAM(s) IV Push daily  sodium chloride 0.65% Nasal 1 Spray(s) Both Nostrils every 12 hours  sodium chloride 0.9% for Nebulization 3 milliLiter(s) Nebulizer every 6 hours  sodium chloride 0.9%. 1000 milliLiter(s) (10 mL/Hr) IV Continuous <Continuous>  sodium zirconium cyclosilicate 10 Gram(s) Oral <User Schedule>  traZODone 100 milliGRAM(s) Oral at bedtime      LABS:  03-04    137  |  95[L]  |  21  ----------------------------<  103[H]  3.8   |  28  |  3.99[H]    Ca    8.9      04 Mar 2025 00:29  Phos  4.0     03-04  Mg     2.3     03-04    TPro  6.5  /  Alb  3.0[L]  /  TBili  0.4  /  DBili      /  AST  22  /  ALT  12  /  AlkPhos  85  03-04    Creatinine Trend: 3.99 <--, 5.47 <--, 4.89 <--, 4.32 <--, 3.15 <--, 4.51 <--, 3.45 <--, 5.17 <--    Albumin: 3.0 g/dL (03-04 @ 00:29)  Phosphorus: 4.0 mg/dL (03-04 @ 00:29)                              8.1    7.42  )-----------( 269      ( 04 Mar 2025 00:29 )             26.4     Urine Studies:  Urinalysis - [03-04-25 @ 00:29]      Color  / Appearance  / SG  / pH       Gluc 103 / Ketone   / Bili  / Urobili        Blood  / Protein  / Leuk Est  / Nitrite       RBC  / WBC  / Hyaline  / Gran  / Sq Epi  / Non Sq Epi  / Bacteria       Iron 29, TIBC 143, %sat 20      [12-19-24 @ 05:00]  Ferritin 904      [12-19-24 @ 05:00]  TSH 4.75      [12-24-24 @ 06:50]        RADIOLOGY & ADDITIONAL STUDIES:

## 2025-03-04 NOTE — PROGRESS NOTE ADULT - SUBJECTIVE AND OBJECTIVE BOX
MR#[id number]  PATIENT NAME:RAAD CANO    DATE OF SERVICE: 03-04-25 @ 06:22  Patient was seen and examined by Solo Quick MD on    03-04-25 @ 06:22 .  Interim events noted.Consultant notes ,Labs,Telemetry reviewed by me       HOSPITAL COURSE: HPI:  54M with PMH of HTN, HLD, ESRD on HD MWF via LUE AVF, ascites (requiring repeat paracenteses q4-6wks), NICM with moderate LV dysfunction w/ EF 35-40%(2023) and CAD s/p atherectomy + SALLIE to D1(4/11/2024) presents to [facility] transferred from [facility]. Patient initially presented to [facility] ED with shortness of breath. Of note he was recently admitted from 09/27-12/02 for acute cholecystitis s/p cholecystectomy. In [facility] ED, pt was hypoxic to 86% on RA, trop 1.7K, EKG no new changes, CXR fluid overload. Admitted for AHRF 2/2 fluid overload. Pt received HD on 12/18, 12/19, 12/20 and 12/22. DAPT was resumed, TTE showed improvement in LVEF to 40-45%. Stress test was abnormal with 1mm inferior STD, reversible ischemia in the inferior wall and fixed defects in the lateral, anterior and apical walls with post stress EF of 24%. Pt was then transferred to [facility] for cardiac cath which showed pLAD 70% ISR, mLCx 70%, D1 severe dz. Patient now transferred to [facility] CTS Dr. Rivers for CABG eval. Patient denies any current SOB or chest pain on admission. Otherwise denies headaches, abdominal pain, urinary or bowel changes, fevers, chills, N/V/D, sick contacts.  (24 Dec 2024 05:07)      INTERIM EVENTS:Patient seen at bedside ,interim events noted.  12/23 Cardiac cath  pLAD 70% ISR, mLCx 70%, D1 severe dz.   12/31-POD#1-C3L free LIMA-LAD; SVG-Diag; SVG-leftPL Awake OOB extubated Sinus rhythm on Dobutamine gtt  02/19 Had Bronch yesterday on vent HS remains in Atrial Flutter  02/21-Awake on HFTC at night Ambulating-Atrial Flutter  02/22-Had Midline Remains in Atrial Flutter on TC  02/23-Lying in bed still on NGT feeds on TC-HFTC-40L/30% Atrial Flutter  02/24-Awake Tolerating TC Atrial Flutter~~'s  For HD today  02/26-OOB on TC not dyspneac remains in AFl on Tube feeds  02/27-Had HD remains in AFl on TC  02/28 Awake had Bronch Trach downsized to 7 AFl    03/01-Awake Atrial Flutter no dyspnea TC tolerating  03/02-Awake vomitting subsided  03/04-Awake Had FEES still NPO Atrial Flutter       MEDICATIONS  (STANDING):  aMIOdarone    Tablet 200 milliGRAM(s) Oral daily  ascorbic acid 500 milliGRAM(s) Oral daily  aspirin  chewable 81 milliGRAM(s) Oral daily  atorvastatin 80 milliGRAM(s) Oral at bedtime  buDESOnide    Inhalation Suspension 0.5 milliGRAM(s) Inhalation two times a day  chlorhexidine 2% Cloths 1 Application(s) Topical daily  chlorhexidine 4% Liquid 1 Application(s) Topical <User Schedule>  dextrose 50% Injectable 50 milliLiter(s) IV Push every 15 minutes  epoetin ignacia (EPOGEN) Injectable 98974 Unit(s) IV Push <User Schedule>  ferrous    sulfate Liquid 300 milliGRAM(s) Enteral Tube daily  heparin  Infusion 1300 Unit(s)/Hr (13 mL/Hr) IV Continuous <Continuous>  insulin lispro (ADMELOG) corrective regimen sliding scale   SubCutaneous every 6 hours  melatonin 5 milliGRAM(s) Oral at bedtime  meropenem  IVPB 500 milliGRAM(s) IV Intermittent every 24 hours  methocarbamol 500 milliGRAM(s) Oral every 8 hours  metoprolol tartrate 25 milliGRAM(s) Oral two times a day  mirtazapine 7.5 milliGRAM(s) Oral at bedtime  Nephro-elicia 1 Tablet(s) Oral daily  pantoprazole  Injectable 40 milliGRAM(s) IV Push daily  sodium chloride 0.65% Nasal 1 Spray(s) Both Nostrils every 12 hours  sodium chloride 0.9% for Nebulization 3 milliLiter(s) Nebulizer every 6 hours  sodium chloride 0.9%. 1000 milliLiter(s) (10 mL/Hr) IV Continuous <Continuous>  sodium zirconium cyclosilicate 10 Gram(s) Oral <User Schedule>  traZODone 100 milliGRAM(s) Oral at bedtime    MEDICATIONS  (PRN):  acetaminophen     Tablet .. 650 milliGRAM(s) Oral every 6 hours PRN Mild Pain (1 - 3)  lidocaine/prilocaine Cream 1 Application(s) Topical daily PRN pre-HD  sodium chloride 0.9% lock flush 10 milliLiter(s) IV Push every 1 hour PRN Pre/post blood products, medications, blood draw, and to maintain line patency            REVIEW OF SYSTEMS:  Constitutional: [ ] fever, [ ]weight loss,  [ ]fatigue [ ]weight gain  Eyes: [ ] visual changes  Respiratory: [ ]shortness of breath;  [ ] cough, [ ]wheezing, [ ]chills, [ ]hemoptysis  Cardiovascular: [ ] chest pain, [ ]palpitations, [ ]dizziness,  [ ]leg swelling[ ]orthopnea[ ]PND  Gastrointestinal: [ ] abdominal pain, [ ]nausea, [ ]vomiting,  [ ]diarrhea [ ]Constipation [ ]Melena  Genitourinary: [ ] dysuria, [ ] hematuria [ ]Vigil  Neurologic: [ ] headaches [ ] tremors[ ]weakness [ ]Paralysis Right[ ] Left[ ]  Skin: [ ] itching, [ ]burning, [ ] rashes  Endocrine: [ ] heat or cold intolerance  Musculoskeletal: [ ] joint pain or swelling; [ ] muscle, back, or extremity pain  Psychiatric: [ ] depression, [ ]anxiety, [ ]mood swings, or [ ]difficulty sleeping  Hematologic: [ ] easy bruising, [ ] bleeding gums    [ ] All remaining systems negative except as per above.   [ ]Unable to obtain.  [x] No change in ROS since admission      Vital Signs Last 24 Hrs  T(C): 35.9 (04 Mar 2025 04:00), Max: 37.1 (03 Mar 2025 20:00)  T(F): 96.7 (04 Mar 2025 04:00), Max: 98.8 (03 Mar 2025 20:00)  HR: 60 (04 Mar 2025 06:00) (58 - 107)  BP: 108/45 (04 Mar 2025 06:00) (90/55 - 138/59)  BP(mean): 65 (04 Mar 2025 06:00) (57 - 87)  RR: 12 (04 Mar 2025 06:00) (11 - 23)  SpO2: 100% (04 Mar 2025 06:00) (74% - 100%)    Parameters below as of 04 Mar 2025 05:34  Patient On (Oxygen Delivery Method): tracheostomy collar      I&O's Summary    02 Mar 2025 07:01  -  03 Mar 2025 07:00  --------------------------------------------------------  IN: 929 mL / OUT: 2 mL / NET: 927 mL    03 Mar 2025 07:01  -  04 Mar 2025 06:22  --------------------------------------------------------  IN: 1145.5 mL / OUT: 2001 mL / NET: -855.5 mL        PHYSICAL EXAM:  General: No acute distress BMI-28  HEENT: EOMI, PERRL  Neck: Supple, [ ] JVD  Lungs: Equal air entry bilaterally; [ ] rales [ ] wheezing [ ] rhonchi  Heart: Irregular rate and rhythm; [x ] murmur   2/6 [ x] systolic [ ] diastolic [ ] radiation[ ] rubs [ ]  gallops  Abdomen: Nontender, bowel sounds present  Extremities: No clubbing, cyanosis, [ ] edema [ ]Pulses  equal and intact  Nervous system:  Alert & Oriented X3, no focal deficits  Psychiatric: Normal affect  Skin: No rashes or lesions    LABS:  03-04    137  |  95[L]  |  21  ----------------------------<  103[H]  3.8   |  28  |  3.99[H]    Ca    8.9      04 Mar 2025 00:29  Phos  4.0     03-04  Mg     2.3     03-04    TPro  6.5  /  Alb  3.0[L]  /  TBili  0.4  /  DBili  x   /  AST  22  /  ALT  12  /  AlkPhos  85  03-04    Creatinine Trend: 3.99<--, 5.47<--, 4.89<--, 4.32<--, 3.15<--, 4.51<--                        8.1    7.42  )-----------( 269      ( 04 Mar 2025 00:29 )             26.4     PTT - ( 04 Mar 2025 00:29 )  PTT:67.5 sec           Xray Chest 1 View- PORTABLE-Urgent (Xray Chest 1 View- PORTABLE-Urgent .) (02.27.25 @ 07:35) >  COMPARISON: Chest x-ray 2/26/2023).    FINDINGS:  2/26/2025 6:05 AM:  Patient is status post sternotomy. Enteric tube courses over the   diaphragm with tip overlying the stomach.  Midline sternotomy wires.  The heart is unchanged in size. There are perihilar interstitial   opacities. Small left pleural effusion. There is no pneumothorax.    02/27/2025 6:33 AM:.  Worsening opacification the left hemithorax.  There is no pneumothorax.    IMPRESSION:  Worsening opacification of the left hemithorax with minimal residual  aeration of the left upper lobe.      US Abdomen Limited (02.25.25 @ 15:56) >  IMPRESSION:  Moderate volume ascites seen in all 4 quadrants. Deepest pocket at the  right lower quadrant.    Right pleural effusion.     TTE Limited W or WO Ultrasound Enhancing Agent (02.18.25 @ 13:54) >  CONCLUSIONS:      1. Left ventricular systolic function is normal.   2. Enlarged right ventricular cavity size and mildly reduced right ventricular systolic function.   3. Left pleural effusion noted.   4. Trace pericardial effusion.

## 2025-03-04 NOTE — PROGRESS NOTE ADULT - ASSESSMENT
53 yo M with multiple medical problems including CAD s/p PCI in 2024 s/p CABG x 3 on 24  1: Intubated for hypoxia & left lung white out s/p awake bronch with removal of copious mucopurulent secretions  : s/p IABP insertion, sepsis/septic shock due to E coli   ESBL pneumonia, collapsed Left Lung, s/p multiple bronch    tracheostomy tube placement    VRE E. Faecium Bcx   +HSV PCR BAL   tissue cx from back abscess - Serratia/MRSA  - - intermittent bradycardia/junctional episodes with hypotension  2/3: off , off theophylline. iHD 1L UF  : bronch for Left lung collapse wound vac, 2decho w/ moderate pericardial effusion - similar as before, US abd: small - moderate ascites - monitor at this time.  Wound vac placed for sacral wounds  : iHD-1L UF  : bronch today off positive pressure   : concern for left food drop   2/10 : no acute issues - CXR shows improving left sided aeration, pt subjectively reports having difficulty while lying flat  : s/p Paracentesis 3.5 L removed, leukocytosis   : Ascites fluid PMN < 250 (less likely SBP)   sniff test yesterday showed paradoxical diaphragmatic motion    : mucus plugging but able to clear airway with aggressive pulmonary toileting.   : no vent requirement overnight, able to mobilize secretion with pulm toileting  hyperkalemia post HD - workup for possible recirculation underway   : On TC X 48 hours - ambulating well, no mucus plugging events  : HFTC x24hrs (40L 30%)  : iHD 1.5L UF  : iHD 2L  : trach downsized to 7, bronch  - cultures growing ESBL E Coli       #S/p CABG  #Respiratory failure due to recurrent left lung plugging now s/p trach  #ESRD on HD  #Sacral decub   #Recurrent ascites likely from Chronic RV failure - s/p paracentesis   #Left Diaphragm dysfunction     A/P    #ESBL E COLI  pt with no fever and no leucocytosis but team believes that pts status improved since starting abs  we mutually agreed on  a short course of abs to treat a tracheobronchitis  pt was plugging   Aggressive pulm toileting, chest percussion, suction as needed  Continue Trach collar wean oxygen as tolerated    #Ascites  History of Ascites - abdominal exam stable, no fluid shift or increased girth, no acute indication for paracenthesis    #back lesion  resolved    #decubitus  vac   off loading   improving  local wound care      Marla Champion M.D. ,   please reach via teams   If no answer, or after 5PM/ weekends,  then please call  [phone]    Assessment and plan discussed with the primary team .

## 2025-03-04 NOTE — PROGRESS NOTE ADULT - SUBJECTIVE AND OBJECTIVE BOX
Patient is a 54y old  Male who presents with a chief complaint of CAD s/p CABG (04 Mar 2025 06:22)    Being followed by ID for        Interval history:  No other acute events      ROS:  No cough,SOB,CP  No N/V/D  No abd pain  No urinary complaints  No HA  No joint or limb pain  No other complaints    PAST MEDICAL & SURGICAL HISTORY:  Type 2 diabetes mellitus      Hypertension      End stage renal disease  started HD 2/2019 T, Th, Sat via right chest permacath      Bone spur  right shoulder- hx of - sx done      Anemia      Injury of right wrist  hx of at age 14      History of hyperkalemia  before HD- K-6.2 - to repeat in am of sx, pt had HD today      S/P arthroscopy of right shoulder  2010      History of vascular access device  right chest permacath - 2/2019      H/O right wrist surgery  tendons repair - at age 14      AV fistula      H/O ventral hernia repair      S/P cholecystectomy        Allergies    No Known Allergies    Intolerances      Antimicrobials:    meropenem  IVPB 500 milliGRAM(s) IV Intermittent every 24 hours    MEDICATIONS  (STANDING):  ascorbic acid 500 milliGRAM(s) Oral daily  aspirin  chewable 81 milliGRAM(s) Oral daily  atorvastatin 80 milliGRAM(s) Oral at bedtime  buDESOnide    Inhalation Suspension 0.5 milliGRAM(s) Inhalation two times a day  chlorhexidine 2% Cloths 1 Application(s) Topical daily  chlorhexidine 4% Liquid 1 Application(s) Topical <User Schedule>  dextrose 50% Injectable 50 milliLiter(s) IV Push every 15 minutes  epoetin ignacia (EPOGEN) Injectable 56526 Unit(s) IV Push <User Schedule>  ferrous    sulfate Liquid 300 milliGRAM(s) Enteral Tube daily  heparin  Infusion 1300 Unit(s)/Hr (13 mL/Hr) IV Continuous <Continuous>  insulin lispro (ADMELOG) corrective regimen sliding scale   SubCutaneous every 6 hours  melatonin 5 milliGRAM(s) Oral at bedtime  meropenem  IVPB 500 milliGRAM(s) IV Intermittent every 24 hours  methocarbamol 500 milliGRAM(s) Oral every 8 hours  metoprolol tartrate 25 milliGRAM(s) Oral two times a day  mirtazapine 7.5 milliGRAM(s) Oral at bedtime  Nephro-elicia 1 Tablet(s) Oral daily  pantoprazole  Injectable 40 milliGRAM(s) IV Push daily  sodium chloride 0.65% Nasal 1 Spray(s) Both Nostrils every 12 hours  sodium chloride 0.9% for Nebulization 3 milliLiter(s) Nebulizer every 6 hours  sodium chloride 0.9%. 1000 milliLiter(s) (10 mL/Hr) IV Continuous <Continuous>  sodium zirconium cyclosilicate 10 Gram(s) Oral <User Schedule>  traZODone 100 milliGRAM(s) Oral at bedtime      Vital Signs Last 24 Hrs  T(C): 36.3 (03-04-25 @ 08:00), Max: 37.1 (03-03-25 @ 20:00)  T(F): 97.4 (03-04-25 @ 08:00), Max: 98.8 (03-03-25 @ 20:00)  HR: 60 (03-04-25 @ 09:11) (59 - 107)  BP: 112/52 (03-04-25 @ 08:00) (90/55 - 138/59)  BP(mean): 75 (03-04-25 @ 08:00) (57 - 86)  RR: 12 (03-04-25 @ 09:11) (11 - 23)  SpO2: 100% (03-04-25 @ 09:11) (74% - 100%)    Physical Exam:    Constitutional well preserved,comfortable,pleasant    HEENT PERRLA EOMI,No pallor or icterus    No oral exudate or erythema    Neck supple no JVD or LN    Chest Good AE,CTA    CVS RRR S1 S2 WNl No murmur or rub or gallop    Abd soft BS normal No tenderness no masses    Ext No cyanosis clubbing or edema    IV site no erythema tenderness or discharge    Joints no swelling or LOM    CNS AAO X 3 no focal    Lab Data:                          8.1    7.42  )-----------( 269      ( 04 Mar 2025 00:29 )             26.4       03-04    137  |  95[L]  |  21  ----------------------------<  103[H]  3.8   |  28  |  3.99[H]    Ca    8.9      04 Mar 2025 00:29  Phos  4.0     03-04  Mg     2.3     03-04    TPro  6.5  /  Alb  3.0[L]  /  TBili  0.4  /  DBili  x   /  AST  22  /  ALT  12  /  AlkPhos  85  03-04      Urinalysis Basic - ( 04 Mar 2025 00:29 )    Color: x / Appearance: x / SG: x / pH: x  Gluc: 103 mg/dL / Ketone: x  / Bili: x / Urobili: x   Blood: x / Protein: x / Nitrite: x   Leuk Esterase: x / RBC: x / WBC x   Sq Epi: x / Non Sq Epi: x / Bacteria: x        Bronchial Bronchial Lavage  02-28-25   Numerous Escherichia coli ESBL  Commensal manjit consistent with body site  --  Escherichia coli ESBL                    WBC Count: 7.42 (03-04-25 @ 00:29)  WBC Count: 7.87 (03-03-25 @ 03:58)  WBC Count: 7.96 (03-02-25 @ 00:43)  WBC Count: 7.37 (03-01-25 @ 00:38)  WBC Count: 8.59 (02-28-25 @ 00:29)  WBC Count: 8.78 (02-26-25 @ 23:40)  WBC Count: 10.08 (02-26-25 @ 00:42)       Bilirubin Total: 0.4 mg/dL (03-04-25 @ 00:29)  Aspartate Aminotransferase (AST/SGOT): 22 U/L (03-04-25 @ 00:29)  Alanine Aminotransferase (ALT/SGPT): 12 U/L (03-04-25 @ 00:29)  Alkaline Phosphatase: 85 U/L (03-04-25 @ 00:29)  Bilirubin Total: 0.3 mg/dL (03-03-25 @ 03:58)  Aspartate Aminotransferase (AST/SGOT): 16 U/L (03-03-25 @ 03:58)  Alanine Aminotransferase (ALT/SGPT): 12 U/L (03-03-25 @ 03:58)  Alkaline Phosphatase: 82 U/L (03-03-25 @ 03:58)  Bilirubin Total: 0.4 mg/dL (03-02-25 @ 14:23)  Aspartate Aminotransferase (AST/SGOT): 22 U/L (03-02-25 @ 14:23)  Alanine Aminotransferase (ALT/SGPT): 12 U/L (03-02-25 @ 14:23)  Alkaline Phosphatase: 92 U/L (03-02-25 @ 14:23)  Bilirubin Total: 0.4 mg/dL (03-02-25 @ 00:42)  Aspartate Aminotransferase (AST/SGOT): 20 U/L (03-02-25 @ 00:42)  Alanine Aminotransferase (ALT/SGPT): 10 U/L (03-02-25 @ 00:42)  Alkaline Phosphatase: 87 U/L (03-02-25 @ 00:42)  Bilirubin Total: 0.5 mg/dL (03-01-25 @ 00:40)  Aspartate Aminotransferase (AST/SGOT): 16 U/L (03-01-25 @ 00:40)  Alanine Aminotransferase (ALT/SGPT): 10 U/L (03-01-25 @ 00:40)  Alkaline Phosphatase: 106 U/L (03-01-25 @ 00:40)  Bilirubin Total: 0.4 mg/dL (02-28-25 @ 00:29)  Aspartate Aminotransferase (AST/SGOT): 17 U/L (02-28-25 @ 00:29)  Alanine Aminotransferase (ALT/SGPT): 9 U/L (02-28-25 @ 00:29)  Alkaline Phosphatase: 91 U/L (02-28-25 @ 00:29)         Patient is a 54y old  Male who presents with a chief complaint of CAD s/p CABG (04 Mar 2025 06:22)    Being followed by ID for        Interval history:  pt sleeping  s/p FEES noted to have extensive edema above vocal cords -  Eval for supraglottic edema  - ENT follow up prior to trach decannulation           PAST MEDICAL & SURGICAL HISTORY:  Type 2 diabetes mellitus      Hypertension      End stage renal disease  started HD 2/2019 T, Th, Sat via right chest permacath      Bone spur  right shoulder- hx of - sx done      Anemia      Injury of right wrist  hx of at age 14      History of hyperkalemia  before HD- K-6.2 - to repeat in am of sx, pt had HD today      S/P arthroscopy of right shoulder  2010      History of vascular access device  right chest permacath - 2/2019      H/O right wrist surgery  tendons repair - at age 14      AV fistula      H/O ventral hernia repair      S/P cholecystectomy        Allergies    No Known Allergies    Intolerances      Antimicrobials:    meropenem  IVPB 500 milliGRAM(s) IV Intermittent every 24 hours    MEDICATIONS  (STANDING):  ascorbic acid 500 milliGRAM(s) Oral daily  aspirin  chewable 81 milliGRAM(s) Oral daily  atorvastatin 80 milliGRAM(s) Oral at bedtime  buDESOnide    Inhalation Suspension 0.5 milliGRAM(s) Inhalation two times a day  chlorhexidine 2% Cloths 1 Application(s) Topical daily  chlorhexidine 4% Liquid 1 Application(s) Topical <User Schedule>  dextrose 50% Injectable 50 milliLiter(s) IV Push every 15 minutes  epoetin ignacia (EPOGEN) Injectable 68620 Unit(s) IV Push <User Schedule>  ferrous    sulfate Liquid 300 milliGRAM(s) Enteral Tube daily  heparin  Infusion 1300 Unit(s)/Hr (13 mL/Hr) IV Continuous <Continuous>  insulin lispro (ADMELOG) corrective regimen sliding scale   SubCutaneous every 6 hours  melatonin 5 milliGRAM(s) Oral at bedtime  meropenem  IVPB 500 milliGRAM(s) IV Intermittent every 24 hours  methocarbamol 500 milliGRAM(s) Oral every 8 hours  metoprolol tartrate 25 milliGRAM(s) Oral two times a day  mirtazapine 7.5 milliGRAM(s) Oral at bedtime  Nephro-elicia 1 Tablet(s) Oral daily  pantoprazole  Injectable 40 milliGRAM(s) IV Push daily  sodium chloride 0.65% Nasal 1 Spray(s) Both Nostrils every 12 hours  sodium chloride 0.9% for Nebulization 3 milliLiter(s) Nebulizer every 6 hours  sodium chloride 0.9%. 1000 milliLiter(s) (10 mL/Hr) IV Continuous <Continuous>  sodium zirconium cyclosilicate 10 Gram(s) Oral <User Schedule>  traZODone 100 milliGRAM(s) Oral at bedtime      Vital Signs Last 24 Hrs  T(C): 36.3 (03-04-25 @ 08:00), Max: 37.1 (03-03-25 @ 20:00)  T(F): 97.4 (03-04-25 @ 08:00), Max: 98.8 (03-03-25 @ 20:00)  HR: 60 (03-04-25 @ 09:11) (59 - 107)  BP: 112/52 (03-04-25 @ 08:00) (90/55 - 138/59)  BP(mean): 75 (03-04-25 @ 08:00) (57 - 86)  RR: 12 (03-04-25 @ 09:11) (11 - 23)  SpO2: 100% (03-04-25 @ 09:11) (74% - 100%)    Physical Exam:    Constitutional pt sleeping    Neck trach    Chest Good AE,CTA    CVS  S1 S2     Abd soft BS normal No tenderness     Ext No cyanosis clubbing or edema    IV site no erythema tenderness or discharge        Lab Data:                          8.1    7.42  )-----------( 269      ( 04 Mar 2025 00:29 )             26.4       03-04    137  |  95[L]  |  21  ----------------------------<  103[H]  3.8   |  28  |  3.99[H]    Ca    8.9      04 Mar 2025 00:29  Phos  4.0     03-04  Mg     2.3     03-04    TPro  6.5  /  Alb  3.0[L]  /  TBili  0.4  /  DBili  x   /  AST  22  /  ALT  12  /  AlkPhos  85  03-04              Bronchial Bronchial Lavage  02-28-25   Numerous Escherichia coli ESBL  Commensal manjit consistent with body site  --  Escherichia coli ESBL        Culture - Bronchial (02.28.25 @ 08:19)    Gram Stain:   Rare polymorphonuclear leukocytes per low power field  No Squamous epithelial cells per low power field  Few Gram positive cocci in pairs per oil power field   -  Ampicillin: R >16 These ampicillin results predict results for amoxicillin   -  Ampicillin/Sulbactam: R 16/8   -  Aztreonam: R >16   -  Cefazolin: R >16   -  Cefepime: R >16   -  Ceftriaxone: R >32   -  Ciprofloxacin: R >2   -  Ertapenem: S <=0.5   -  Gentamicin: S <=2   -  Imipenem: S <=1   -  Levofloxacin: R >4   -  Meropenem: S <=1   -  Piperacillin/Tazobactam: R <=8   -  Tobramycin: I 4   -  Trimethoprim/Sulfamethoxazole: R >2/38   Specimen Source: Bronchial Bronchial Lavage   Culture Results:   Numerous Escherichia coli ESBL  Commensal manjit consistent with body site   Organism Identification: Escherichia coli ESBL   Organism: Escherichia coli ESBL   Method Type: LISA      WBC Count: 7.42 (03-04-25 @ 00:29)  WBC Count: 7.87 (03-03-25 @ 03:58)  WBC Count: 7.96 (03-02-25 @ 00:43)  WBC Count: 7.37 (03-01-25 @ 00:38)  WBC Count: 8.59 (02-28-25 @ 00:29)  WBC Count: 8.78 (02-26-25 @ 23:40)  WBC Count: 10.08 (02-26-25 @ 00:42)       Bilirubin Total: 0.4 mg/dL (03-04-25 @ 00:29)  Aspartate Aminotransferase (AST/SGOT): 22 U/L (03-04-25 @ 00:29)  Alanine Aminotransferase (ALT/SGPT): 12 U/L (03-04-25 @ 00:29)  Alkaline Phosphatase: 85 U/L (03-04-25 @ 00:29)    < from: Xray Chest 1 View- PORTABLE-Urgent (Xray Chest 1 View- PORTABLE-Urgent .) (03.04.25 @ 11:09) >  IMPRESSION:  Increase in retrocardiac opacity compared to prior the reflect small left   pleural effusion versus atelectasis.    < end of copied text >      < from: US Abdomen Complete (US Abdomen Complete .) (03.04.25 @ 00:26) >  IMPRESSION:  Limited study due to overlying bowel gas, portable technique, overlying   bandages and patient intolerance of the exam.  Specifically, there is   limited visualization of the pancreas and bilateral kidneys.    Moderate volume ascites and right-sided pleural effusion.    < end of copied text >

## 2025-03-05 LAB
ALBUMIN SERPL ELPH-MCNC: 2.9 G/DL — LOW (ref 3.3–5)
ALP SERPL-CCNC: 87 U/L — SIGNIFICANT CHANGE UP (ref 40–120)
ALT FLD-CCNC: 13 U/L — SIGNIFICANT CHANGE UP (ref 10–45)
ANION GAP SERPL CALC-SCNC: 14 MMOL/L — SIGNIFICANT CHANGE UP (ref 5–17)
APTT BLD: 63.2 SEC — HIGH (ref 24.5–35.6)
AST SERPL-CCNC: 18 U/L — SIGNIFICANT CHANGE UP (ref 10–40)
BILIRUB SERPL-MCNC: 0.4 MG/DL — SIGNIFICANT CHANGE UP (ref 0.2–1.2)
BLD GP AB SCN SERPL QL: NEGATIVE — SIGNIFICANT CHANGE UP
BUN SERPL-MCNC: 28 MG/DL — HIGH (ref 7–23)
CALCIUM SERPL-MCNC: 9 MG/DL — SIGNIFICANT CHANGE UP (ref 8.4–10.5)
CHLORIDE SERPL-SCNC: 96 MMOL/L — SIGNIFICANT CHANGE UP (ref 96–108)
EGFR: 13 ML/MIN/1.73M2 — LOW
EGFR: 13 ML/MIN/1.73M2 — LOW
GLUCOSE SERPL-MCNC: 146 MG/DL — HIGH (ref 70–99)
HCT VFR BLD CALC: 25.7 % — LOW (ref 39–50)
HGB BLD-MCNC: 8 G/DL — LOW (ref 13–17)
MAGNESIUM SERPL-MCNC: 2.4 MG/DL — SIGNIFICANT CHANGE UP (ref 1.6–2.6)
MCHC RBC-ENTMCNC: 31.1 G/DL — LOW (ref 32–36)
MCHC RBC-ENTMCNC: 31.5 PG — SIGNIFICANT CHANGE UP (ref 27–34)
MCV RBC AUTO: 101.2 FL — HIGH (ref 80–100)
NRBC BLD AUTO-RTO: 0 /100 WBCS — SIGNIFICANT CHANGE UP (ref 0–0)
PHOSPHATE SERPL-MCNC: 4.4 MG/DL — SIGNIFICANT CHANGE UP (ref 2.5–4.5)
PLATELET # BLD AUTO: 240 K/UL — SIGNIFICANT CHANGE UP (ref 150–400)
POTASSIUM SERPL-MCNC: 3.9 MMOL/L — SIGNIFICANT CHANGE UP (ref 3.5–5.3)
POTASSIUM SERPL-SCNC: 3.9 MMOL/L — SIGNIFICANT CHANGE UP (ref 3.5–5.3)
PROT SERPL-MCNC: 6.4 G/DL — SIGNIFICANT CHANGE UP (ref 6–8.3)
RBC # BLD: 2.54 M/UL — LOW (ref 4.2–5.8)
RBC # FLD: 20.5 % — HIGH (ref 10.3–14.5)
RH IG SCN BLD-IMP: POSITIVE — SIGNIFICANT CHANGE UP
SODIUM SERPL-SCNC: 137 MMOL/L — SIGNIFICANT CHANGE UP (ref 135–145)
WBC # BLD: 7.77 K/UL — SIGNIFICANT CHANGE UP (ref 3.8–10.5)
WBC # FLD AUTO: 7.77 K/UL — SIGNIFICANT CHANGE UP (ref 3.8–10.5)

## 2025-03-05 PROCEDURE — 99291 CRITICAL CARE FIRST HOUR: CPT

## 2025-03-05 PROCEDURE — 99232 SBSQ HOSP IP/OBS MODERATE 35: CPT

## 2025-03-05 PROCEDURE — 71045 X-RAY EXAM CHEST 1 VIEW: CPT | Mod: 26

## 2025-03-05 PROCEDURE — G0545: CPT

## 2025-03-05 RX ORDER — OXYCODONE HYDROCHLORIDE 30 MG/1
10 TABLET ORAL ONCE
Refills: 0 | Status: DISCONTINUED | OUTPATIENT
Start: 2025-03-05 | End: 2025-03-05

## 2025-03-05 RX ORDER — FENTANYL CITRATE-0.9 % NACL/PF 100MCG/2ML
25 SYRINGE (ML) INTRAVENOUS ONCE
Refills: 0 | Status: DISCONTINUED | OUTPATIENT
Start: 2025-03-05 | End: 2025-03-05

## 2025-03-05 RX ORDER — ALBUMIN (HUMAN) 12.5 G/50ML
100 INJECTION, SOLUTION INTRAVENOUS ONCE
Refills: 0 | Status: COMPLETED | OUTPATIENT
Start: 2025-03-05 | End: 2025-03-05

## 2025-03-05 RX ADMIN — Medication 300 MILLIGRAM(S): at 12:27

## 2025-03-05 RX ADMIN — Medication 3 MILLILITER(S): at 06:00

## 2025-03-05 RX ADMIN — Medication 3 MILLILITER(S): at 17:32

## 2025-03-05 RX ADMIN — BUDESONIDE 0.5 MILLIGRAM(S): 0.25 SUSPENSION RESPIRATORY (INHALATION) at 17:32

## 2025-03-05 RX ADMIN — OXYCODONE HYDROCHLORIDE 10 MILLIGRAM(S): 30 TABLET ORAL at 17:50

## 2025-03-05 RX ADMIN — Medication 100 MILLIGRAM(S): at 21:04

## 2025-03-05 RX ADMIN — HEPARIN SODIUM 13 UNIT(S)/HR: 1000 INJECTION INTRAVENOUS; SUBCUTANEOUS at 21:16

## 2025-03-05 RX ADMIN — Medication 25 MICROGRAM(S): at 11:30

## 2025-03-05 RX ADMIN — ATORVASTATIN CALCIUM 80 MILLIGRAM(S): 80 TABLET, FILM COATED ORAL at 21:03

## 2025-03-05 RX ADMIN — Medication 3 MILLILITER(S): at 23:05

## 2025-03-05 RX ADMIN — Medication 25 MICROGRAM(S): at 11:45

## 2025-03-05 RX ADMIN — EPOETIN ALFA 10000 UNIT(S): 10000 SOLUTION INTRAVENOUS; SUBCUTANEOUS at 14:46

## 2025-03-05 RX ADMIN — Medication 1 TABLET(S): at 12:27

## 2025-03-05 RX ADMIN — Medication 500 MILLIGRAM(S): at 12:27

## 2025-03-05 RX ADMIN — Medication 81 MILLIGRAM(S): at 12:27

## 2025-03-05 RX ADMIN — METHOCARBAMOL 500 MILLIGRAM(S): 500 TABLET, FILM COATED ORAL at 21:03

## 2025-03-05 RX ADMIN — Medication 5 MILLIGRAM(S): at 21:04

## 2025-03-05 RX ADMIN — Medication 1 APPLICATION(S): at 21:16

## 2025-03-05 RX ADMIN — Medication 1 APPLICATION(S): at 05:42

## 2025-03-05 RX ADMIN — MIRTAZAPINE 7.5 MILLIGRAM(S): 30 TABLET, FILM COATED ORAL at 21:04

## 2025-03-05 RX ADMIN — METHOCARBAMOL 500 MILLIGRAM(S): 500 TABLET, FILM COATED ORAL at 05:48

## 2025-03-05 RX ADMIN — OXYCODONE HYDROCHLORIDE 10 MILLIGRAM(S): 30 TABLET ORAL at 17:05

## 2025-03-05 RX ADMIN — MEROPENEM 100 MILLIGRAM(S): 1 INJECTION INTRAVENOUS at 23:16

## 2025-03-05 RX ADMIN — INSULIN LISPRO 2: 100 INJECTION, SOLUTION INTRAVENOUS; SUBCUTANEOUS at 00:17

## 2025-03-05 RX ADMIN — Medication 10 MILLILITER(S): at 21:16

## 2025-03-05 RX ADMIN — Medication 40 MILLIGRAM(S): at 12:26

## 2025-03-05 RX ADMIN — Medication 1000 MILLIGRAM(S): at 23:15

## 2025-03-05 RX ADMIN — BUDESONIDE 0.5 MILLIGRAM(S): 0.25 SUSPENSION RESPIRATORY (INHALATION) at 06:00

## 2025-03-05 RX ADMIN — Medication 400 MILLIGRAM(S): at 23:00

## 2025-03-05 NOTE — PROGRESS NOTE ADULT - SUBJECTIVE AND OBJECTIVE BOX
Patient is a 54y old  Male who presents with a chief complaint of CAD s/p CABG (04 Mar 2025 06:22)      HPI:  54M with PMH of HTN, HLD, ESRD on HD MWF via LUE AVF, ascites (requiring repeat paracenteses q4-6wks), NICM with moderate LV dysfunction w/ EF 35-40%(2023) and CAD s/p atherectomy + SALLIE to D1(4/11/2024) presents to [facility] transferred from [facility]. Patient initially presented to [facility] ED with shortness of breath. Of note he was recently admitted from 09/27-12/02 for acute cholecystitis s/p cholecystectomy. In [facility] ED, pt was hypoxic to 86% on RA, trop 1.7K, EKG no new changes, CXR fluid overload. Admitted for AHRF 2/2 fluid overload. Pt received HD on 12/18, 12/19, 12/20 and 12/22. DAPT was resumed, TTE showed improvement in LVEF to 40-45%. Stress test was abnormal with 1mm inferior STD, reversible ischemia in the inferior wall and fixed defects in the lateral, anterior and apical walls with post stress EF of 24%. Pt was then transferred to [facility] for cardiac cath which showed pLAD 70% ISR, mLCx 70%, D1 severe dz. Patient now transferred to [facility] CTS Dr. Rivres for CABG eval. Patient denies any current SOB or chest pain on admission. Otherwise denies headaches, abdominal pain, urinary or bowel changes, fevers, chills, N/V/D, sick contacts.  (24 Dec 2024 05:07)      PAST MEDICAL & SURGICAL HISTORY:  Type 2 diabetes mellitus      Hypertension      End stage renal disease  started HD 2/2019 T, Th, Sat via right chest permacath      Bone spur  right shoulder- hx of - sx done      Anemia      Injury of right wrist  hx of at age 14      History of hyperkalemia  before HD- K-6.2 - to repeat in am of sx, pt had HD today      S/P arthroscopy of right shoulder  2010      History of vascular access device  right chest permacath - 2/2019      H/O right wrist surgery  tendons repair - at age 14      AV fistula      H/O ventral hernia repair      S/P cholecystectomy          PREVIOUS DIAGNOSTIC TESTING:      ECHO  FINDINGS:    STRESS  FINDINGS:    CATHETERIZATION  FINDINGS:    MEDICATIONS  (STANDING):  ascorbic acid 500 milliGRAM(s) Oral daily  aspirin  chewable 81 milliGRAM(s) Oral daily  atorvastatin 80 milliGRAM(s) Oral at bedtime  buDESOnide    Inhalation Suspension 0.5 milliGRAM(s) Inhalation two times a day  chlorhexidine 2% Cloths 1 Application(s) Topical daily  chlorhexidine 4% Liquid 1 Application(s) Topical <User Schedule>  dextrose 50% Injectable 50 milliLiter(s) IV Push every 15 minutes  epoetin ignacia (EPOGEN) Injectable 36361 Unit(s) IV Push <User Schedule>  ferrous    sulfate Liquid 300 milliGRAM(s) Enteral Tube daily  heparin  Infusion 1300 Unit(s)/Hr (13 mL/Hr) IV Continuous <Continuous>  insulin lispro (ADMELOG) corrective regimen sliding scale   SubCutaneous every 6 hours  melatonin 5 milliGRAM(s) Oral at bedtime  meropenem  IVPB 500 milliGRAM(s) IV Intermittent every 24 hours  methocarbamol 500 milliGRAM(s) Oral every 8 hours  metoprolol tartrate 25 milliGRAM(s) Oral two times a day  mirtazapine 7.5 milliGRAM(s) Oral at bedtime  Nephro-elicia 1 Tablet(s) Oral daily  pantoprazole  Injectable 40 milliGRAM(s) IV Push daily  sodium chloride 0.65% Nasal 1 Spray(s) Both Nostrils every 12 hours  sodium chloride 0.9% for Nebulization 3 milliLiter(s) Nebulizer every 6 hours  sodium chloride 0.9%. 1000 milliLiter(s) (10 mL/Hr) IV Continuous <Continuous>  sodium zirconium cyclosilicate 10 Gram(s) Oral <User Schedule>  traZODone 100 milliGRAM(s) Oral at bedtime    MEDICATIONS  (PRN):  acetaminophen     Tablet .. 650 milliGRAM(s) Oral every 6 hours PRN Mild Pain (1 - 3)  lidocaine/prilocaine Cream 1 Application(s) Topical daily PRN pre-HD  sodium chloride 0.9% lock flush 10 milliLiter(s) IV Push every 1 hour PRN Pre/post blood products, medications, blood draw, and to maintain line patency      FAMILY HISTORY:  FH: hypertension  mother, father- alive    FH: type 2 diabetes  mother, father- alive        SOCIAL HISTORY:    CIGARETTES:    ALCOHOL:    REVIEW OF SYSTEMS:  CONSTITUTIONAL: No fever, weight loss, or fatigue  EYES: No eye pain, visual disturbances, or discharge  ENMT:  No difficulty hearing, tinnitus, vertigo; No sinus or throat pain  NECK: No pain or stiffness  RESPIRATORY: No cough, wheezing, chills or hemoptysis; No shortness of breath  CARDIOVASCULAR: No chest pain, palpitations, dizziness, or leg swelling  GASTROINTESTINAL: No abdominal or epigastric pain. No nausea, vomiting, or hematemesis; No diarrhea or constipation. No melena or hematochezia.  GENITOURINARY: No dysuria, frequency, hematuria, or incontinence  NEUROLOGICAL: No headaches, memory loss, loss of strength, numbness, or tremors  SKIN: No itching, burning, rashes, or lesions   LYMPH NODES: No enlarged glands  ENDOCRINE: No heat or cold intolerance; No hair loss  MUSCULOSKELETAL: No joint pain or swelling; No muscle, back, or extremity pain  PSYCHIATRIC: No depression, anxiety, mood swings, or difficulty sleeping  HEME/LYMPH: No easy bruising, or bleeding gums  ALLERY AND IMMUNOLOGIC: No hives or eczema    Vital Signs Last 24 Hrs  T(C): 37.1 (05 Mar 2025 20:00), Max: 37.1 (05 Mar 2025 20:00)  T(F): 98.8 (05 Mar 2025 20:00), Max: 98.8 (05 Mar 2025 20:00)  HR: 108 (05 Mar 2025 20:00) (58 - 112)  BP: 110/49 (05 Mar 2025 20:00) (99/42 - 144/65)  BP(mean): 70 (05 Mar 2025 20:00) (56 - 94)  RR: 16 (05 Mar 2025 20:00) (12 - 32)  SpO2: 79% (05 Mar 2025 20:00) (79% - 100%)    Parameters below as of 05 Mar 2025 17:31  Patient On (Oxygen Delivery Method): tracheostomy collar          PHYSICAL EXAM:  GENERAL: NAD, well-groomed, well-developed  HEAD:  Atraumatic, Normocephalic  EYES: EOMI, PERRLA, conjunctiva and sclera clear  ENMT: No tonsillar erythema, exudates, or enlargement; Moist mucous membranes, Good dentition, No lesions  NECK: Supple, No JVD, Normal thyroid  NERVOUS SYSTEM:  Alert & Oriented X3, Good concentration; Motor Strength 5/5 B/L upper and lower extremities; DTRs 2+ intact and symmetric  CHEST/LUNG: Clear to percussion bilaterally; No rales, rhonchi, wheezing, or rubs  HEART: Regular rate and rhythm; No murmurs, rubs, or gallops  ABDOMEN: Soft, Nontender, Nondistended; Bowel sounds present  EXTREMITIES:  2+ Peripheral Pulses, No clubbing, cyanosis, or edema  LYMPH: No lymphadenopathy noted  SKIN: No rashes or lesions      INTERPRETATION OF TELEMETRY:    ECG:    CHADSVASC:     LABS:                        8.0    7.77  )-----------( 240      ( 05 Mar 2025 00:29 )             25.7     03-05    137  |  96  |  28[H]  ----------------------------<  146[H]  3.9   |  27  |  4.92[H]    Ca    9.0      05 Mar 2025 00:29  Phos  4.4     03-05  Mg     2.4     03-05    TPro  6.4  /  Alb  2.9[L]  /  TBili  0.4  /  DBili  x   /  AST  18  /  ALT  13  /  AlkPhos  87  03-05        PT/INR - ( 04 Mar 2025 14:14 )   PT: 13.4 sec;   INR: 1.18 ratio         PTT - ( 05 Mar 2025 00:29 )  PTT:63.2 sec  Urinalysis Basic - ( 05 Mar 2025 00:29 )    Color: x / Appearance: x / SG: x / pH: x  Gluc: 146 mg/dL / Ketone: x  / Bili: x / Urobili: x   Blood: x / Protein: x / Nitrite: x   Leuk Esterase: x / RBC: x / WBC x   Sq Epi: x / Non Sq Epi: x / Bacteria: x      Lipid Panel:   I&O's Summary    04 Mar 2025 07:01  -  05 Mar 2025 07:00  --------------------------------------------------------  IN: 1095.5 mL / OUT: 0 mL / NET: 1095.5 mL    05 Mar 2025 07:01  -  05 Mar 2025 21:33  --------------------------------------------------------  IN: 290 mL / OUT: 2000 mL / NET: -1710 mL        RADIOLOGY & ADDITIONAL STUDIES:

## 2025-03-05 NOTE — PROGRESS NOTE ADULT - ASSESSMENT
54M with PMH of HTN, HLD, ESRD on HD MWF via LUE AVF, ascites (requiring repeat paracenteses q4-6wks), NICM with moderate LV dysfunction w/ EF 35-40%() and CAD s/p atherectomy + SALLIE to D1(2024) presents to [facility] transferred from [facility].  Cardiac cath which showed pLAD 70% ISR, mLCx 70%, D1 severe dz.   Patient now transferred to [facility] CTS Dr. Rivers for CABG eval      # CAD s/p NSTEMI  Hx CAD s/p PCI LAD   Presented with ADHF elevated troponins  Positive NST1-Cath- pLAD 70% ISR, mLCx 70%, D1 severe dz.   TTE EF 35% Severe TRWas on Plavix-[id number] been off Plavix since -POD#1-C3L free LIMA-LAD; SVG-Diag; ACP-[id number]-Svvkfcgka on  Sinus rhythm,Amio for AF prophylaxis   Atrial Flutter on TC during day Ambulating  -Increased congestion on CXR had HD yesterday remains in AFl   -Atrial Flutter HFTC 40L/30% BP stable     -Atrial Flutter tolerating TC for HD MWF-consider DOAC   OOB Abd US moderate 4 quad ascites noted remains in Atrial Flutter~~70's   TC Atrial Flutter  -s/p Bronch/BAL remains in AFl  Trach downsized 7   -Tolerating TC AFl rate controlled On Heparin gtt transition to DOAC      # Acute on Chronic Systolic Heart Failure now improved  EF-35% ,severe TR  Had HD post op  GDMT post op  LVEF improved post bypass   -TTE EF 40%,trace effusion  ECG c/w pericarditis-was on colchicine   01/15-TTE EF 55%  -TTE EF 60%   -Normal LV systolic function Moderate pericardial effusion  -On TC-HF and Vent support at nights   02/10-Vent HS with T Collar trial Ambulated Remains in AF  Repeat TTE Normal LV Systolic function Small Pericardial effusion decreased from 2/3        Vascular evaluated  AVGraft /?steal causing RV failure-'' Very low suspicion of steal Sd and AVF causing current clinical state. ''   VA Duplex Hemodialysis Access, Left (25 @ 13:35) >   Arterial flow volume of 1301 mL/m, and the venous flow volume of  1492 mL/m are consistent with a normally functioning dialysis access  fistula.      # Ascites  Hx chronic ascites  Has drainage q4-6 weeks  Last drained -4600mL  ? ascites related to severe TR  Had [id number] growth   CT Abdomen 01/10-Large volume ascites-  US abdomen--Small to moderate volume abdominal/pelvic ascites  US abdomen  Moderate ascites  US Abdomen -Moderate 4 quad ascites    # ESRD  Now on  HD-MWF

## 2025-03-05 NOTE — PROGRESS NOTE ADULT - ASSESSMENT
55 yo M with multiple medical problems including CAD s/p PCI in 2024 s/p CABG x 3 on 24  1: Intubated for hypoxia & left lung white out s/p awake bronch with removal of copious mucopurulent secretions  : s/p IABP insertion, sepsis/septic shock due to E coli   ESBL pneumonia, collapsed Left Lung, s/p multiple bronch    tracheostomy tube placement    VRE E. Faecium Bcx   +HSV PCR BAL   tissue cx from back abscess - Serratia/MRSA  - - intermittent bradycardia/junctional episodes with hypotension  2/3: off , off theophylline. iHD 1L UF  : bronch for Left lung collapse wound vac, 2decho w/ moderate pericardial effusion - similar as before, US abd: small - moderate ascites - monitor at this time.  Wound vac placed for sacral wounds  : iHD-1L UF  : bronch today off positive pressure   : concern for left food drop   2/10 : no acute issues - CXR shows improving left sided aeration, pt subjectively reports having difficulty while lying flat  : s/p Paracentesis 3.5 L removed, leukocytosis   : Ascites fluid PMN < 250 (less likely SBP)   sniff test yesterday showed paradoxical diaphragmatic motion    : mucus plugging but able to clear airway with aggressive pulmonary toileting.   : no vent requirement overnight, able to mobilize secretion with pulm toileting  hyperkalemia post HD - workup for possible recirculation underway   : On TC X 48 hours - ambulating well, no mucus plugging events  : HFTC x24hrs (40L 30%)  : iHD 1.5L UF  : iHD 2L  : trach downsized to 7, bronch  - cultures growing ESBL E Coli       #S/p CABG  #Respiratory failure due to recurrent left lung plugging now s/p trach  #ESRD on HD  #Sacral decub   #Recurrent ascites likely from Chronic RV failure - s/p paracentesis   #Left Diaphragm dysfunction     A/P    #ESBL E COLI  pt with no fever and no leucocytosis but team believes that pts status improved since starting abs  we mutually agreed on  a short course of abs to treat a tracheobronchitis  pt was plugging   Aggressive pulm toileting, chest percussion, suction as needed  Continue Trach collar wean oxygen as tolerated  completed short course of ertapenem    #Ascites  History of Ascites - abdominal exam stable, no fluid shift or increased girth, no acute indication for paracenthesis    #back lesion  resolved    #decubitus  vac   off loading   improving  local wound care      Marla Champion M.D. ,   please reach via teams   If no answer, or after 5PM/ weekends,  then please call  [phone]    Assessment and plan discussed with the primary team .    ID will follow the patient PRN. Please recontact ID if we can be of further assistance . [phone]               53 yo M with multiple medical problems including CAD s/p PCI in 2024 s/p CABG x 3 on 24  1: Intubated for hypoxia & left lung white out s/p awake bronch with removal of copious mucopurulent secretions  : s/p IABP insertion, sepsis/septic shock due to E coli   ESBL pneumonia, collapsed Left Lung, s/p multiple bronch    tracheostomy tube placement    VRE E. Faecium Bcx   +HSV PCR BAL   tissue cx from back abscess - Serratia/MRSA  - - intermittent bradycardia/junctional episodes with hypotension  2/3: off , off theophylline. iHD 1L UF  : bronch for Left lung collapse wound vac, 2decho w/ moderate pericardial effusion - similar as before, US abd: small - moderate ascites - monitor at this time.  Wound vac placed for sacral wounds  : iHD-1L UF  : bronch today off positive pressure   : concern for left food drop   2/10 : no acute issues - CXR shows improving left sided aeration, pt subjectively reports having difficulty while lying flat  : s/p Paracentesis 3.5 L removed, leukocytosis   : Ascites fluid PMN < 250 (less likely SBP)   sniff test yesterday showed paradoxical diaphragmatic motion    : mucus plugging but able to clear airway with aggressive pulmonary toileting.   : no vent requirement overnight, able to mobilize secretion with pulm toileting  hyperkalemia post HD - workup for possible recirculation underway   : On TC X 48 hours - ambulating well, no mucus plugging events  : HFTC x24hrs (40L 30%)  : iHD 1.5L UF  : iHD 2L  : trach downsized to 7, bronch  - cultures growing ESBL E Coli       #S/p CABG  #Respiratory failure due to recurrent left lung plugging now s/p trach  #ESRD on HD  #Sacral decub   #Recurrent ascites likely from Chronic RV failure - s/p paracentesis   #Left Diaphragm dysfunction     A/P    #ESBL E COLI  pt with no fever and no leucocytosis but team believes that pts status improved since starting abs  we mutually agreed on  a short course of abs to treat a tracheobronchitis  pt was plugging   Aggressive pulm toileting, chest percussion, suction as needed  Continue Trach collar wean oxygen as tolerated  complete short course of meropenem - today    #Ascites  History of Ascites - abdominal exam stable, no fluid shift or increased girth, no acute indication for paracenthesis    #back lesion  resolved    #decubitus  vac   off loading   improving  local wound care      Marla Champion M.D. ,   please reach via teams   If no answer, or after 5PM/ weekends,  then please call  [phone]    Assessment and plan discussed with the primary team .    ID will follow the patient PRN. Please recontact ID if we can be of further assistance . [phone]

## 2025-03-05 NOTE — PROGRESS NOTE ADULT - SUBJECTIVE AND OBJECTIVE BOX
[facility]    Dr. Nica Styles     Office [phone] (9 am to 5 pm)  Service: [phone] (5pm to 9am)  Also Available on TEAMS      RENAL PROGRESS NOTE: DATE OF SERVICE 03-05-25 @ 08:42    Patient is a 54y old  Male who presents with a chief complaint of CAD s/p CABG (04 Mar 2025 06:22)      Patient seen and examined at bedside. No chest pain/sob    VITALS:  T(F): 96.5 (03-05-25 @ 04:00), Max: 99.7 (03-04-25 @ 20:00)  HR: 59 (03-05-25 @ 08:00)  BP: 113/54 (03-05-25 @ 08:00)  RR: 23 (03-05-25 @ 08:00)  SpO2: 93% (03-05-25 @ 08:00)  Wt(kg): --    03-04 @ 07:01  -  03-05 @ 07:00  --------------------------------------------------------  IN: 1095.5 mL / OUT: 0 mL / NET: 1095.5 mL          PHYSICAL EXAM:  Constitutional: NAD  Neck: No JVD  Respiratory: CTAB, no wheezes, rales or rhonchi  Cardiovascular: S1, S2, RRR  Gastrointestinal: BS+, soft, NT/ND  Extremities: No peripheral edema    Hospital Medications:   MEDICATIONS  (STANDING):  ascorbic acid 500 milliGRAM(s) Oral daily  aspirin  chewable 81 milliGRAM(s) Oral daily  atorvastatin 80 milliGRAM(s) Oral at bedtime  buDESOnide    Inhalation Suspension 0.5 milliGRAM(s) Inhalation two times a day  chlorhexidine 2% Cloths 1 Application(s) Topical daily  chlorhexidine 4% Liquid 1 Application(s) Topical <User Schedule>  dextrose 50% Injectable 50 milliLiter(s) IV Push every 15 minutes  epoetin ignacia (EPOGEN) Injectable 33441 Unit(s) IV Push <User Schedule>  ferrous    sulfate Liquid 300 milliGRAM(s) Enteral Tube daily  heparin  Infusion 1300 Unit(s)/Hr (13 mL/Hr) IV Continuous <Continuous>  insulin lispro (ADMELOG) corrective regimen sliding scale   SubCutaneous every 6 hours  melatonin 5 milliGRAM(s) Oral at bedtime  meropenem  IVPB 500 milliGRAM(s) IV Intermittent every 24 hours  methocarbamol 500 milliGRAM(s) Oral every 8 hours  metoprolol tartrate 25 milliGRAM(s) Oral two times a day  mirtazapine 7.5 milliGRAM(s) Oral at bedtime  Nephro-elicia 1 Tablet(s) Oral daily  pantoprazole  Injectable 40 milliGRAM(s) IV Push daily  sodium chloride 0.65% Nasal 1 Spray(s) Both Nostrils every 12 hours  sodium chloride 0.9% for Nebulization 3 milliLiter(s) Nebulizer every 6 hours  sodium chloride 0.9%. 1000 milliLiter(s) (10 mL/Hr) IV Continuous <Continuous>  sodium zirconium cyclosilicate 10 Gram(s) Oral <User Schedule>  traZODone 100 milliGRAM(s) Oral at bedtime      LABS:  03-05    137  |  96  |  28[H]  ----------------------------<  146[H]  3.9   |  27  |  4.92[H]    Ca    9.0      05 Mar 2025 00:29  Phos  4.4     03-05  Mg     2.4     03-05    TPro  6.4  /  Alb  2.9[L]  /  TBili  0.4  /  DBili      /  AST  18  /  ALT  13  /  AlkPhos  87  03-05    Creatinine Trend: 4.92 <--, 3.99 <--, 5.47 <--, 4.89 <--, 4.32 <--, 3.15 <--, 4.51 <--, 3.45 <--    Phosphorus: 4.4 mg/dL (03-05 @ 00:29)  Albumin: 2.9 g/dL (03-05 @ 00:29)                              8.0    7.77  )-----------( 240      ( 05 Mar 2025 00:29 )             25.7     Urine Studies:  Urinalysis - [03-05-25 @ 00:29]      Color  / Appearance  / SG  / pH       Gluc 146 / Ketone   / Bili  / Urobili        Blood  / Protein  / Leuk Est  / Nitrite       RBC  / WBC  / Hyaline  / Gran  / Sq Epi  / Non Sq Epi  / Bacteria       Iron 29, TIBC 143, %sat 20      [12-19-24 @ 05:00]  Ferritin 904      [12-19-24 @ 05:00]  TSH 4.75      [12-24-24 @ 06:50]        RADIOLOGY & ADDITIONAL STUDIES:

## 2025-03-05 NOTE — PROGRESS NOTE ADULT - SUBJECTIVE AND OBJECTIVE BOX
Patient is a 54y old  Male who presents with a chief complaint of CAD s/p CABG (04 Mar 2025 06:22)    Being followed by ID for        Interval history:  No other acute events      ROS:  No cough,SOB,CP  No N/V/D  No abd pain  No urinary complaints  No HA  No joint or limb pain  No other complaints    PAST MEDICAL & SURGICAL HISTORY:  Type 2 diabetes mellitus      Hypertension      End stage renal disease  started HD 2/2019 T, Th, Sat via right chest permacath      Bone spur  right shoulder- hx of - sx done      Anemia      Injury of right wrist  hx of at age 14      History of hyperkalemia  before HD- K-6.2 - to repeat in am of sx, pt had HD today      S/P arthroscopy of right shoulder  2010      History of vascular access device  right chest permacath - 2/2019      H/O right wrist surgery  tendons repair - at age 14      AV fistula      H/O ventral hernia repair      S/P cholecystectomy        Allergies    No Known Allergies    Intolerances      Antimicrobials:    meropenem  IVPB 500 milliGRAM(s) IV Intermittent every 24 hours    MEDICATIONS  (STANDING):  ascorbic acid 500 milliGRAM(s) Oral daily  aspirin  chewable 81 milliGRAM(s) Oral daily  atorvastatin 80 milliGRAM(s) Oral at bedtime  buDESOnide    Inhalation Suspension 0.5 milliGRAM(s) Inhalation two times a day  chlorhexidine 2% Cloths 1 Application(s) Topical daily  chlorhexidine 4% Liquid 1 Application(s) Topical <User Schedule>  dextrose 50% Injectable 50 milliLiter(s) IV Push every 15 minutes  epoetin ignacia (EPOGEN) Injectable 18402 Unit(s) IV Push <User Schedule>  ferrous    sulfate Liquid 300 milliGRAM(s) Enteral Tube daily  heparin  Infusion 1300 Unit(s)/Hr (13 mL/Hr) IV Continuous <Continuous>  insulin lispro (ADMELOG) corrective regimen sliding scale   SubCutaneous every 6 hours  melatonin 5 milliGRAM(s) Oral at bedtime  meropenem  IVPB 500 milliGRAM(s) IV Intermittent every 24 hours  methocarbamol 500 milliGRAM(s) Oral every 8 hours  metoprolol tartrate 25 milliGRAM(s) Oral two times a day  mirtazapine 7.5 milliGRAM(s) Oral at bedtime  Nephro-elicia 1 Tablet(s) Oral daily  pantoprazole  Injectable 40 milliGRAM(s) IV Push daily  sodium chloride 0.65% Nasal 1 Spray(s) Both Nostrils every 12 hours  sodium chloride 0.9% for Nebulization 3 milliLiter(s) Nebulizer every 6 hours  sodium chloride 0.9%. 1000 milliLiter(s) (10 mL/Hr) IV Continuous <Continuous>  sodium zirconium cyclosilicate 10 Gram(s) Oral <User Schedule>  traZODone 100 milliGRAM(s) Oral at bedtime      Vital Signs Last 24 Hrs  T(C): 35.8 (03-05-25 @ 04:00), Max: 37.6 (03-04-25 @ 20:00)  T(F): 96.5 (03-05-25 @ 04:00), Max: 99.7 (03-04-25 @ 20:00)  HR: 60 (03-05-25 @ 09:01) (58 - 125)  BP: 108/48 (03-05-25 @ 09:00) (92/44 - 144/61)  BP(mean): 69 (03-05-25 @ 09:00) (64 - 89)  RR: 32 (03-05-25 @ 09:01) (12 - 32)  SpO2: 92% (03-05-25 @ 09:01) (84% - 100%)    Physical Exam:    Constitutional well preserved,comfortable,pleasant    HEENT PERRLA EOMI,No pallor or icterus    No oral exudate or erythema    Neck supple no JVD or LN    Chest Good AE,CTA    CVS RRR S1 S2 WNl No murmur or rub or gallop    Abd soft BS normal No tenderness no masses    Ext No cyanosis clubbing or edema    IV site no erythema tenderness or discharge    Joints no swelling or LOM    CNS AAO X 3 no focal    Lab Data:                          8.0    7.77  )-----------( 240      ( 05 Mar 2025 00:29 )             25.7       03-05    137  |  96  |  28[H]  ----------------------------<  146[H]  3.9   |  27  |  4.92[H]    Ca    9.0      05 Mar 2025 00:29  Phos  4.4     03-05  Mg     2.4     03-05    TPro  6.4  /  Alb  2.9[L]  /  TBili  0.4  /  DBili  x   /  AST  18  /  ALT  13  /  AlkPhos  87  03-05      Urinalysis Basic - ( 05 Mar 2025 00:29 )    Color: x / Appearance: x / SG: x / pH: x  Gluc: 146 mg/dL / Ketone: x  / Bili: x / Urobili: x   Blood: x / Protein: x / Nitrite: x   Leuk Esterase: x / RBC: x / WBC x   Sq Epi: x / Non Sq Epi: x / Bacteria: x        Bronchial Bronchial Lavage  02-28-25   Numerous Escherichia coli ESBL  Commensal manjit consistent with body site  --  Escherichia coli ESBL                    WBC Count: 7.77 (03-05-25 @ 00:29)  WBC Count: 7.42 (03-04-25 @ 00:29)  WBC Count: 7.87 (03-03-25 @ 03:58)  WBC Count: 7.96 (03-02-25 @ 00:43)  WBC Count: 7.37 (03-01-25 @ 00:38)  WBC Count: 8.59 (02-28-25 @ 00:29)  WBC Count: 8.78 (02-26-25 @ 23:40)       Bilirubin Total: 0.4 mg/dL (03-05-25 @ 00:29)  Aspartate Aminotransferase (AST/SGOT): 18 U/L (03-05-25 @ 00:29)  Alanine Aminotransferase (ALT/SGPT): 13 U/L (03-05-25 @ 00:29)  Alkaline Phosphatase: 87 U/L (03-05-25 @ 00:29)  Bilirubin Total: 0.4 mg/dL (03-04-25 @ 00:29)  Aspartate Aminotransferase (AST/SGOT): 22 U/L (03-04-25 @ 00:29)  Alanine Aminotransferase (ALT/SGPT): 12 U/L (03-04-25 @ 00:29)  Alkaline Phosphatase: 85 U/L (03-04-25 @ 00:29)  Bilirubin Total: 0.3 mg/dL (03-03-25 @ 03:58)  Aspartate Aminotransferase (AST/SGOT): 16 U/L (03-03-25 @ 03:58)  Alanine Aminotransferase (ALT/SGPT): 12 U/L (03-03-25 @ 03:58)  Alkaline Phosphatase: 82 U/L (03-03-25 @ 03:58)  Bilirubin Total: 0.4 mg/dL (03-02-25 @ 14:23)  Aspartate Aminotransferase (AST/SGOT): 22 U/L (03-02-25 @ 14:23)  Alanine Aminotransferase (ALT/SGPT): 12 U/L (03-02-25 @ 14:23)  Alkaline Phosphatase: 92 U/L (03-02-25 @ 14:23)  Bilirubin Total: 0.4 mg/dL (03-02-25 @ 00:42)  Aspartate Aminotransferase (AST/SGOT): 20 U/L (03-02-25 @ 00:42)  Alanine Aminotransferase (ALT/SGPT): 10 U/L (03-02-25 @ 00:42)  Alkaline Phosphatase: 87 U/L (03-02-25 @ 00:42)  Bilirubin Total: 0.5 mg/dL (03-01-25 @ 00:40)  Aspartate Aminotransferase (AST/SGOT): 16 U/L (03-01-25 @ 00:40)  Alanine Aminotransferase (ALT/SGPT): 10 U/L (03-01-25 @ 00:40)  Alkaline Phosphatase: 106 U/L (03-01-25 @ 00:40)         Patient is a 54y old  Male who presents with a chief complaint of CAD s/p CABG (04 Mar 2025 06:22)    Being followed by ID for        Interval history:  pt completed course of abs  pt resting quietly  doing better  hoping to eat soon  claims walking is better  No other acute events        PAST MEDICAL & SURGICAL HISTORY:  Type 2 diabetes mellitus      Hypertension      End stage renal disease  started HD 2/2019 T, Th, Sat via right chest permacath      Bone spur  right shoulder- hx of - sx done      Anemia      Injury of right wrist  hx of at age 14      History of hyperkalemia  before HD- K-6.2 - to repeat in am of sx, pt had HD today      S/P arthroscopy of right shoulder  2010      History of vascular access device  right chest permacath - 2/2019      H/O right wrist surgery  tendons repair - at age 14      AV fistula      H/O ventral hernia repair      S/P cholecystectomy        Allergies    No Known Allergies    Intolerances      Antimicrobials:    meropenem  IVPB 500 milliGRAM(s) IV Intermittent every 24 hours    MEDICATIONS  (STANDING):  ascorbic acid 500 milliGRAM(s) Oral daily  aspirin  chewable 81 milliGRAM(s) Oral daily  atorvastatin 80 milliGRAM(s) Oral at bedtime  buDESOnide    Inhalation Suspension 0.5 milliGRAM(s) Inhalation two times a day  chlorhexidine 2% Cloths 1 Application(s) Topical daily  chlorhexidine 4% Liquid 1 Application(s) Topical <User Schedule>  dextrose 50% Injectable 50 milliLiter(s) IV Push every 15 minutes  epoetin ignacia (EPOGEN) Injectable 20287 Unit(s) IV Push <User Schedule>  ferrous    sulfate Liquid 300 milliGRAM(s) Enteral Tube daily  heparin  Infusion 1300 Unit(s)/Hr (13 mL/Hr) IV Continuous <Continuous>  insulin lispro (ADMELOG) corrective regimen sliding scale   SubCutaneous every 6 hours  melatonin 5 milliGRAM(s) Oral at bedtime  meropenem  IVPB 500 milliGRAM(s) IV Intermittent every 24 hours  methocarbamol 500 milliGRAM(s) Oral every 8 hours  metoprolol tartrate 25 milliGRAM(s) Oral two times a day  mirtazapine 7.5 milliGRAM(s) Oral at bedtime  Nephro-elicia 1 Tablet(s) Oral daily  pantoprazole  Injectable 40 milliGRAM(s) IV Push daily  sodium chloride 0.65% Nasal 1 Spray(s) Both Nostrils every 12 hours  sodium chloride 0.9% for Nebulization 3 milliLiter(s) Nebulizer every 6 hours  sodium chloride 0.9%. 1000 milliLiter(s) (10 mL/Hr) IV Continuous <Continuous>  sodium zirconium cyclosilicate 10 Gram(s) Oral <User Schedule>  traZODone 100 milliGRAM(s) Oral at bedtime      Vital Signs Last 24 Hrs  T(C): 35.8 (03-05-25 @ 04:00), Max: 37.6 (03-04-25 @ 20:00)  T(F): 96.5 (03-05-25 @ 04:00), Max: 99.7 (03-04-25 @ 20:00)  HR: 60 (03-05-25 @ 09:01) (58 - 125)  BP: 108/48 (03-05-25 @ 09:00) (92/44 - 144/61)  BP(mean): 69 (03-05-25 @ 09:00) (64 - 89)  RR: 32 (03-05-25 @ 09:01) (12 - 32)  SpO2: 92% (03-05-25 @ 09:01) (84% - 100%)    Physical Exam:    Constitutional well preserved,comfortable,pleasant    HEENT PERRLA EOMI,No pallor or icterus    No oral exudate or erythema    Neck trach    Chest Good AE,CTA    CVS  S1 S2     Abd soft BS normal No tenderness    Ext No cyanosis clubbing or edema    IV site no erythema tenderness or discharge    Joints no swelling or LOM    CNS AAO X 3 no focal    Lab Data:                          8.0    7.77  )-----------( 240      ( 05 Mar 2025 00:29 )             25.7       03-05    137  |  96  |  28[H]  ----------------------------<  146[H]  3.9   |  27  |  4.92[H]    Ca    9.0      05 Mar 2025 00:29  Phos  4.4     03-05  Mg     2.4     03-05    TPro  6.4  /  Alb  2.9[L]  /  TBili  0.4  /  DBili  x   /  AST  18  /  ALT  13  /  AlkPhos  87  03-05            Bronchial Bronchial Lavage  02-28-25   Numerous Escherichia coli ESBL  Commensal manjit consistent with body site  --  Escherichia coli ESBL                    WBC Count: 7.77 (03-05-25 @ 00:29)  WBC Count: 7.42 (03-04-25 @ 00:29)  WBC Count: 7.87 (03-03-25 @ 03:58)  WBC Count: 7.96 (03-02-25 @ 00:43)  WBC Count: 7.37 (03-01-25 @ 00:38)  WBC Count: 8.59 (02-28-25 @ 00:29)  WBC Count: 8.78 (02-26-25 @ 23:40)       Bilirubin Total: 0.4 mg/dL (03-05-25 @ 00:29)  Aspartate Aminotransferase (AST/SGOT): 18 U/L (03-05-25 @ 00:29)  Alanine Aminotransferase (ALT/SGPT): 13 U/L (03-05-25 @ 00:29)  Alkaline Phosphatase: 87 U/L (03-05-25 @ 00:29)  Bilirubin Total: 0.4 mg/dL (03-04-25 @ 00:29)  Aspartate Aminotransferase (AST/SGOT): 22 U/L (03-04-25 @ 00:29)  Alanine Aminotransferase (ALT/SGPT): 12 U/L (03-04-25 @ 00:29)  Alkaline Phosphatase: 85 U/L (03-04-25 @ 00:29)  Bilirubin Total: 0.3 mg/dL (03-03-25 @ 03:58)  Aspartate Aminotransferase (AST/SGOT): 16 U/L (03-03-25 @ 03:58)  Alanine Aminotransferase (ALT/SGPT): 12 U/L (03-03-25 @ 03:58)  Alkaline Phosphatase: 82 U/L (03-03-25 @ 03:58)  Bilirubin Total: 0.4 mg/dL (03-02-25 @ 14:23)  Aspartate Aminotransferase (AST/SGOT): 22 U/L (03-02-25 @ 14:23)  Alanine Aminotransferase (ALT/SGPT): 12 U/L (03-02-25 @ 14:23)  Alkaline Phosphatase: 92 U/L (03-02-25 @ 14:23)  Bilirubin Total: 0.4 mg/dL (03-02-25 @ 00:42)  Aspartate Aminotransferase (AST/SGOT): 20 U/L (03-02-25 @ 00:42)  Alanine Aminotransferase (ALT/SGPT): 10 U/L (03-02-25 @ 00:42)  Alkaline Phosphatase: 87 U/L (03-02-25 @ 00:42)  Bilirubin Total: 0.5 mg/dL (03-01-25 @ 00:40)  Aspartate Aminotransferase (AST/SGOT): 16 U/L (03-01-25 @ 00:40)  Alanine Aminotransferase (ALT/SGPT): 10 U/L (03-01-25 @ 00:40)  Alkaline Phosphatase: 106 U/L (03-01-25 @ 00:40)

## 2025-03-05 NOTE — PROGRESS NOTE ADULT - ASSESSMENT
54M with PMH of HTN, HLD, ESRD on HD MWF via LUE AVF, ascites (requiring repeat paracenteses q4-6wks), NICM with moderate LV dysfunction w/ EF 35-40%(2023) and CAD s/p atherectomy + SALLIE to D1(4/11/2024) presents to [facility] transferred from [facility] for CABG eval  Nephro on board for HD needs.    ESRD on HD   Nephrologist Dr. Bailey  [facility] Schedule / Access:  LUE AVF  Center: [facility]  Pt s/p HD on 2/19 and 2/21.  s/p HD 2/24 uf 1.5L  S/p HD on 02/26 2 L UF and 02/28 2 L UF again   S/p HD on 03/03  HD today per cristin  Keep MAP>65  Renal Diet  HD consent in chart     Hyper/Hypokalemia  Pt intermittently Hyperkalemia  HD as scheduled  now improved  Low K diet.  C/W Lokelma 10gm on nonHD days.  Monitor K.    HTN  BP fluctuating; controlled.  UF w/ HD as BP permits.  Low sodium diet.  Management per ICU  Monitor BP     CKD MBD  Check PTH   Low PO4 diet.  Not on binder.  Monitor Ca , PO4 daily     Anemia  CRISTIANE with HD  Repeat iron studies.  Transfuse if hgb <7    CAD with multivessel disease  CTICU following  s/p CABG 12/30    Hypoxic respiratory failure requiring Trach  Per surgery  S/p bronchoscopy, pulm following

## 2025-03-05 NOTE — PROGRESS NOTE ADULT - SUBJECTIVE AND OBJECTIVE BOX
Brief history :   53 yo M with multiple medical problems including CAD s/p PCI in 2024 s/p CABG x 3 on 24  1: Intubated for hypoxia & left lung white out s/p awake bronch with removal of copious mucopurulent secretions  : s/p IABP insertion, sepsis/septic shock due to E coli   ESBL pneumonia, collapsed Left Lung, s/p multiple bronch    tracheostomy tube placement    VRE E. Faecium Bcx   +HSV PCR BAL   tissue cx from back abscess - Serratia/MRSA  - - intermittent bradycardia/junctional episodes with hypotension  2/3: off , off theophylline. iHD 1L UF  : bronch for Left lung collapse wound vac, 2decho w/ moderate pericardial effusion - similar as before, US abd: small - moderate ascites - monitor at this time.  Wound vac placed for sacral wounds  : iHD-1L UF  : bronch today off positive pressure   : concern for left food drop   2/10 : no acute issues - CXR shows improving left sided aeration, pt subjectively reports having difficulty while lying flat  : s/p Paracentesis 3.5 L removed, leukocytosis   : Ascites fluid PMN < 250 (less likely SBP)   sniff test yesterday showed paradoxical diaphragmatic motion    : mucus plugging but able to clear airway with aggressive pulmonary toileting.   : no vent requirement overnight, able to mobilize secretion with pulm toileting  hyperkalemia post HD - workup for possible recirculation underway   : On TC X 48 hours - ambulating well, no mucus plugging events  : HFTC x24hrs (40L 30%)  : iHD 1.5L UF  : iHD 2L  : trach downsized to 7, bronch  - cultures growing ESBL E Coli     ICU Vital Signs Last 24 Hrs  T(C): 35.8 (25 @ 04:00), Max: 37.6 (25 @ 20:00)  T(F): 96.5 (25 @ 04:00), Max: 99.7 (25 @ 20:00)  HR: 59 (25 @ 06:00) (58 - 125)  BP: 116/54 (25 @ 06:00) (92/44 - 144/61)  BP(mean): 78 (25 @ 06:00) (64 - 89)  ABP: --  ABP(mean): --  RR: 19 (25 @ 06:00) (10 - 32)  SpO2: 91% (25 @ 06:00) (84% - 100%)    I&O's Summary    04 Mar 2025 07:01  -  05 Mar 2025 07:00  --------------------------------------------------------  IN: 1095.5 mL / OUT: 0 mL / NET: 1095.5 mL                                    8.0    7.77  )-----------( 240      ( 05 Mar 2025 00:29 )             25.7     05 Mar 2025 00:29    137    |  96     |  28     ----------------------------<  146    3.9     |  27     |  4.92     Ca    9.0        05 Mar 2025 00:29  Phos  4.4       05 Mar 2025 00:29  Mg     2.4       05 Mar 2025 00:29    TPro  6.4    /  Alb  2.9    /  TBili  0.4    /  DBili  x      /  AST  18     /  ALT  13     /  AlkPhos  87     05 Mar 2025 00:29    PT/INR - ( 04 Mar 2025 14:14 )   PT: 13.4 sec;   INR: 1.18 ratio         PTT - ( 05 Mar 2025 00:29 )  PTT:63.2 sec      MEDICATIONS  (STANDING):  ascorbic acid 500 milliGRAM(s) Oral daily  aspirin  chewable 81 milliGRAM(s) Oral daily  atorvastatin 80 milliGRAM(s) Oral at bedtime  buDESOnide    Inhalation Suspension 0.5 milliGRAM(s) Inhalation two times a day  chlorhexidine 2% Cloths 1 Application(s) Topical daily  chlorhexidine 4% Liquid 1 Application(s) Topical <User Schedule>  dextrose 50% Injectable 50 milliLiter(s) IV Push every 15 minutes  epoetin ignacia (EPOGEN) Injectable 14543 Unit(s) IV Push <User Schedule>  ferrous    sulfate Liquid 300 milliGRAM(s) Enteral Tube daily  heparin  Infusion 1300 Unit(s)/Hr IV Continuous <Continuous>  insulin lispro (ADMELOG) corrective regimen sliding scale   SubCutaneous every 6 hours  melatonin 5 milliGRAM(s) Oral at bedtime  meropenem  IVPB 500 milliGRAM(s) IV Intermittent every 24 hours  methocarbamol 500 milliGRAM(s) Oral every 8 hours  metoprolol tartrate 25 milliGRAM(s) Oral two times a day  mirtazapine 7.5 milliGRAM(s) Oral at bedtime  Nephro-elicia 1 Tablet(s) Oral daily  pantoprazole  Injectable 40 milliGRAM(s) IV Push daily  sodium chloride 0.65% Nasal 1 Spray(s) Both Nostrils every 12 hours  sodium chloride 0.9% for Nebulization 3 milliLiter(s) Nebulizer every 6 hours  sodium chloride 0.9%. 1000 milliLiter(s) IV Continuous <Continuous>  sodium zirconium cyclosilicate 10 Gram(s) Oral <User Schedule>  traZODone 100 milliGRAM(s) Oral at bedtime    Home Medications:  aspirin 81 mg oral delayed release tablet: 1 tab(s) orally once a day (23 Dec 2024 16:58)  calcium acetate 667 mg oral tablet: 1 tab(s) orally 3 times a day (23 Dec 2024 16:58)  carvedilol 12.5 mg oral tablet: 1 tab(s) orally every 12 hours (23 Dec 2024 16:56)  clopidogrel 75 mg oral tablet: 1 tab(s) orally once a day (23 Dec 2024 16:56)  furosemide 20 mg oral tablet: 1 tab(s) orally once a day (23 Dec 2024 16:58)  hydrALAZINE 25 mg oral tablet: 1 tab(s) orally 3 times a day (23 Dec 2024 16:58)  lisinopril 40 mg oral tablet: 1 tab(s) orally once a day (23 Dec 2024 16:58)  lovastatin 10 mg oral tablet: 1 tab(s) orally once a day (23 Dec 2024 16:56)  NIFEdipine 90 mg oral tablet, extended release: 1 tab(s) orally once a day (23 Dec 2024 16:58)  Emani-Elicia oral tablet: 1 tab(s) orally once a day (23 Dec 2024 16:58)  traZODone 100 mg oral tablet: 1 tab(s) orally once a day (at bedtime) (23 Dec 2024 16:56)    PHYSICAL EXAM:  Gen : no acute distress  Neck: + trach   Respiratory: decreased in the bases  Cardiovascular: AFlutter  Gastrointestinal: distended, soft   Extremities: No peripheral edema  Vascular: 2+ peripheral pulses    S/p CABG  Respiratory failure due to recurrent left lung plugging now s/p trach  ESRD on HD  Post op AFib   History of RV dysfunction   h/o Recurrent ascites   Post operative pain   Sacral decub   Recurrent ascites likely from Chronic RV failure - s/p paracentesis   Left Diaphragm dysfunction     s/p CABG in setting of biV dysfunction.  Recovered from surgery current active issue has been recurrent left lung mucus plugging.  Pt also has left diaphragm dysfunction  s/p trach for secretion management.    Aggressive pulm toileting, chest percussion, suction as needed  Continue Trach collar wean oxygen as tolerated  Short course of antibiotics to cover for tracheobronchitis   HD per renal  History of Ascites - abdominal exam stable, no fluid shift or increased girth, no acute indication for paracentesis  PT, walking.   Minimize labs  Continue Fe/Epo - anemia management  s/p FEES noted to have extensive edema above vocal cords -  Eval for supraglottic edema  - ENT follow up prior to trach decannulation     Critical care time spent    min       Care plan discussed with the ICU care team.   Patient remains critical, at risk for life threatening decompensation.    I have spent 30 minutes providing critical care management to this patient.    By signing my name below, I, Sonali Valle, attest that this documentation has been prepared under the direction and in the presence of Herminio Mendez MD.   Electronically signed: Sonali Valle, 25 @ 07:06    I, Herminio Mendez, personally performed the services described in this documentation. all medical record entries made by the scribe were at my direction and in my presence. I have reviewed the chart and agree that the record reflects my personal performance and is accurate and complete  Electronically signed: Herminio Mendez MD.  Brief history :   53 yo M with multiple medical problems including CAD s/p PCI in 2024 s/p CABG x 3 on 24  1: Intubated for hypoxia & left lung white out s/p awake bronch with removal of copious mucopurulent secretions  : s/p IABP insertion, sepsis/septic shock due to E coli   ESBL pneumonia, collapsed Left Lung, s/p multiple bronch    tracheostomy tube placement    VRE E. Faecium Bcx   +HSV PCR BAL   tissue cx from back abscess - Serratia/MRSA  - - intermittent bradycardia/junctional episodes with hypotension  2/3: off , off theophylline. iHD 1L UF  : bronch for Left lung collapse wound vac, 2decho w/ moderate pericardial effusion - similar as before, US abd: small - moderate ascites - monitor at this time.  Wound vac placed for sacral wounds  : iHD-1L UF  : bronch today off positive pressure   : concern for left food drop   2/10 : no acute issues - CXR shows improving left sided aeration, pt subjectively reports having difficulty while lying flat  : s/p Paracentesis 3.5 L removed, leukocytosis   : Ascites fluid PMN < 250 (less likely SBP)   sniff test yesterday showed paradoxical diaphragmatic motion    : mucus plugging but able to clear airway with aggressive pulmonary toileting.   : no vent requirement overnight, able to mobilize secretion with pulm toileting  hyperkalemia post HD - workup for possible recirculation underway   : On TC X 48 hours - ambulating well, no mucus plugging events  : HFTC x24hrs (40L 30%)  : iHD 1.5L UF  : iHD 2L  : trach downsized to 7, bronch  - cultures growing ESBL E Coli     ICU Vital Signs Last 24 Hrs  T(C): 35.8 (25 @ 04:00), Max: 37.6 (25 @ 20:00)  T(F): 96.5 (25 @ 04:00), Max: 99.7 (25 @ 20:00)  HR: 59 (25 @ 06:00) (58 - 125)  BP: 116/54 (25 @ 06:00) (92/44 - 144/61)  BP(mean): 78 (25 @ 06:00) (64 - 89)  RR: 19 (25 @ 06:00) (10 - 32)  SpO2: 91% (25 @ 06:00) (84% - 100%)    I&O's Summary    04 Mar 2025 07:01  -  05 Mar 2025 07:00  --------------------------------------------------------  IN: 1095.5 mL / OUT: 0 mL / NET: 1095.5 mL                          8.0    7.77  )-----------( 240      ( 05 Mar 2025 00:29 )             25.7     05 Mar 2025 00:29    137    |  96     |  28     ----------------------------<  146    3.9     |  27     |  4.92     Ca    9.0        05 Mar 2025 00:29  Phos  4.4       05 Mar 2025 00:29  Mg     2.4       05 Mar 2025 00:29    TPro  6.4    /  Alb  2.9    /  TBili  0.4    /  DBili  x      /  AST  18     /  ALT  13     /  AlkPhos  87     05 Mar 2025 00:29    PT/INR - ( 04 Mar 2025 14:14 )   PT: 13.4 sec;   INR: 1.18 ratio         PTT - ( 05 Mar 2025 00:29 )  PTT:63.2 sec      MEDICATIONS  (STANDING):  ascorbic acid 500 milliGRAM(s) Oral daily  aspirin  chewable 81 milliGRAM(s) Oral daily  atorvastatin 80 milliGRAM(s) Oral at bedtime  buDESOnide    Inhalation Suspension 0.5 milliGRAM(s) Inhalation two times a day  chlorhexidine 2% Cloths 1 Application(s) Topical daily  chlorhexidine 4% Liquid 1 Application(s) Topical <User Schedule>  dextrose 50% Injectable 50 milliLiter(s) IV Push every 15 minutes  epoetin ignacia (EPOGEN) Injectable 63618 Unit(s) IV Push <User Schedule>  ferrous    sulfate Liquid 300 milliGRAM(s) Enteral Tube daily  heparin  Infusion 1300 Unit(s)/Hr IV Continuous <Continuous>  insulin lispro (ADMELOG) corrective regimen sliding scale   SubCutaneous every 6 hours  melatonin 5 milliGRAM(s) Oral at bedtime  meropenem  IVPB 500 milliGRAM(s) IV Intermittent every 24 hours  methocarbamol 500 milliGRAM(s) Oral every 8 hours  metoprolol tartrate 25 milliGRAM(s) Oral two times a day  mirtazapine 7.5 milliGRAM(s) Oral at bedtime  Nephro-elicia 1 Tablet(s) Oral daily  pantoprazole  Injectable 40 milliGRAM(s) IV Push daily  sodium chloride 0.65% Nasal 1 Spray(s) Both Nostrils every 12 hours  sodium chloride 0.9% for Nebulization 3 milliLiter(s) Nebulizer every 6 hours  sodium chloride 0.9%. 1000 milliLiter(s) IV Continuous <Continuous>  sodium zirconium cyclosilicate 10 Gram(s) Oral <User Schedule>  traZODone 100 milliGRAM(s) Oral at bedtime    Home Medications:  aspirin 81 mg oral delayed release tablet: 1 tab(s) orally once a day (23 Dec 2024 16:58)  calcium acetate 667 mg oral tablet: 1 tab(s) orally 3 times a day (23 Dec 2024 16:58)  carvedilol 12.5 mg oral tablet: 1 tab(s) orally every 12 hours (23 Dec 2024 16:56)  clopidogrel 75 mg oral tablet: 1 tab(s) orally once a day (23 Dec 2024 16:56)  furosemide 20 mg oral tablet: 1 tab(s) orally once a day (23 Dec 2024 16:58)  hydrALAZINE 25 mg oral tablet: 1 tab(s) orally 3 times a day (23 Dec 2024 16:58)  lisinopril 40 mg oral tablet: 1 tab(s) orally once a day (23 Dec 2024 16:58)  lovastatin 10 mg oral tablet: 1 tab(s) orally once a day (23 Dec 2024 16:56)  NIFEdipine 90 mg oral tablet, extended release: 1 tab(s) orally once a day (23 Dec 2024 16:58)  Emani-Elicia oral tablet: 1 tab(s) orally once a day (23 Dec 2024 16:58)  traZODone 100 mg oral tablet: 1 tab(s) orally once a day (at bedtime) (23 Dec 2024 16:56)    PHYSICAL EXAM:  Gen : no acute distress  Neck: + trach   Respiratory: decreased in the bases  Cardiovascular: AFlutter  Gastrointestinal: distended, soft   Extremities: No peripheral edema  Vascular: 2+ peripheral pulses    S/p CABG  Respiratory failure due to recurrent left lung plugging now s/p trach  ESRD on HD  Post op AFib   History of RV dysfunction   h/o Recurrent ascites   Post operative pain   Sacral decub   Recurrent ascites likely from Chronic RV failure - s/p paracentesis   Left Diaphragm dysfunction     s/p CABG in setting of biV dysfunction.  Recovered from surgery current active issue has been recurrent left lung mucus plugging.  Pt also has left diaphragm dysfunction  s/p trach for secretion management.    Aggressive pulm toileting, chest percussion, suction as needed  Continue Trach collar wean oxygen as tolerated  Completing  antibiotics for tracheobronchitis   HD per renal  History of Ascites - abdominal exam stable, no fluid shift or increased girth, no acute indication for paracentesis  PT, walking.   Minimize labs  Continue Fe/Epo - anemia management  s/p FEES noted to have extensive edema above vocal cords -  Eval for supraglottic edema  - ENT follow up prior to trach decannulation         Care plan discussed with the ICU care team.   Patient remains critical, at risk for life threatening decompensation.    I have spent 30 minutes providing critical care management to this patient.    By signing my name below, I, Sonali Valle, attest that this documentation has been prepared under the direction and in the presence of Herminio Mendez MD.   Electronically signed: Sonali Valle, 25 @ 07:06    I, Herminio Mendez, personally performed the services described in this documentation. all medical record entries made by the scribe were at my direction and in my presence. I have reviewed the chart and agree that the record reflects my personal performance and is accurate and complete  Electronically signed: Herminio Mendez MD.

## 2025-03-05 NOTE — CHART NOTE - NSCHARTNOTEFT_GEN_A_CORE
Nutrition Services:    Verbal consult received for tube feeding. Chart reviewed, events noted. Per [facility], pt requested nocturnal feeds. Currently ordered for [employer] Peptide 1.5 at 70mL/hr x 12hr; regimen as ordered does not meet estimated nutrient needs. Pt failed FEES on 3/3, recommended to remain NPO with non-oral means of nutrition/hydration.    ESTIMATED NEEDS:  [x] No change:  [] Updated:  Energy:  7360-9754 kcal/day (30-35 kcal/kg)  Protein:  109-140 g/day (1.4-1.8 g/kg)  Fluid:   ml/day or [x] defer to team  Based on: IBW 172lb/78kg    Recommendations:  1). Continue [employer] Peptide 1.5 at 70mL/hr, as medically feasible and as within pt's GOC, advance feeds by 10mL/hr q4H or as tolerated to goal 110mL/hr x 14hr - monitoring for tolerance to increased rate. Regimen would provide 1540mL volume, 2369kcal, 114g protein, 1078mL free water. Meets 30kcal/kg, ~1.5g/kg protein based on IBW 78kg.  --> If patient able to tolerate increased rate can change to 12hr feeds: Para[employer] Peptide 1.5 at 125mL/hr over 12hr to provide 1500mL volume, 2307kcal, 111g protein, 1050mL free water. Meets ~30kcal/kg, 1.4g/kg protein based on IBW 78kg.  --> MAINTAIN ASPIRATION RISK PRECAUTIONS.  2). Continue Nephro-Lisette and ascorbic acid as ordered.    RD remains available.     Cyndi Mays MS, RD, CDN, CNSC; available via [employer] Nutrition Services:    Verbal consult received for tube feeding. Chart reviewed, events noted. Per [facility], pt requested nocturnal feeds. Currently ordered for [employer] Peptide 1.5 at 70mL/hr x 12hr; regimen as ordered does not meet estimated nutrient needs. Pt failed FEES on 3/3, recommended to remain NPO with non-oral means of nutrition/hydration.    ESTIMATED NEEDS:  [x] No change:  [] Updated:  Energy:  0167-5879 kcal/day (30-35 kcal/kg)  Protein:  109-140 g/day (1.4-1.8 g/kg)  Fluid:   ml/day or [x] defer to team  Based on: IBW 172lb/78kg    Recommendations:  1). Would be cautious nocturnal feeds in patient with history of reported N/V on enteral feeds. Continue [employer] Peptide 1.5 at 70mL/hr, as medically feasible and as within pt's GOC, advance feeds by 10mL/hr q4H or as tolerated to goal 110mL/hr x 14hr - monitoring for tolerance to increased rate. Regimen would provide 1540mL volume, 2369kcal, 114g protein, 1078mL free water. Meets 30kcal/kg, ~1.5g/kg protein based on IBW 78kg.  --> If patient able to tolerate increased rate can change to 12hr feeds: Amanda [employer] Peptide 1.5 at 125mL/hr over 12hr to provide 1500mL volume, 2307kcal, 111g protein, 1050mL free water. Meets ~30kcal/kg, 1.4g/kg protein based on IBW 78kg.  --> MAINTAIN ASPIRATION RISK PRECAUTIONS.  2). Continue Nephro-Lisette and ascorbic acid as ordered.    RD remains available.     Cyndi Myas MS, RD, CDN, CNSC; available via [employer] Nutrition Services:    Verbal consult received for tube feeding. Chart reviewed, events noted. Per [facility], pt requested nocturnal feeds. Currently ordered for [employer] Peptide 1.5 at 70mL/hr x 12hr; regimen as ordered does not meet estimated nutrient needs. Pt failed FEES on 3/3, recommended to remain NPO with non-oral means of nutrition/hydration.    ESTIMATED NEEDS:  [x] No change:  [] Updated:  Energy:  5713-4466 kcal/day (30-35 kcal/kg)  Protein:  109-140 g/day (1.4-1.8 g/kg)  Fluid:   ml/day or [x] defer to team  Based on: IBW 172lb/78kg    Recommendations:  1). Would be cautious nocturnal feeds in patient with history of reported N/V on enteral feeds. Continue [employer] Peptide 1.5 at 70mL/hr, as medically feasible and as within pt's GOC, may advance feeds by 10mL/hr q4H or as tolerated to goal 110mL/hr x 14hr - monitoring for tolerance to increased rate. Regimen would provide 1540mL volume, 2369kcal, 114g protein, 1078mL free water. Meets 30kcal/kg, ~1.5g/kg protein based on IBW 78kg.  --> If patient able to tolerate increased rate can change to 12hr feeds: Amanda [employer] Peptide 1.5 at 125mL/hr over 12hr to provide 1500mL volume, 2307kcal, 111g protein, 1050mL free water. Meets ~30kcal/kg, 1.4g/kg protein based on IBW 78kg.  --> MAINTAIN ASPIRATION RISK PRECAUTIONS.  2). Continue Nephro-Lisette and ascorbic acid as ordered.    RD remains available.     Cyndi Mays MS, RD, CDN, CNSC; available via [employer] Teams

## 2025-03-06 DIAGNOSIS — L89.90 PRESSURE ULCER OF UNSPECIFIED SITE, UNSPECIFIED STAGE: ICD-10-CM

## 2025-03-06 DIAGNOSIS — R18.8 OTHER ASCITES: ICD-10-CM

## 2025-03-06 DIAGNOSIS — Z95.1 PRESENCE OF AORTOCORONARY BYPASS GRAFT: ICD-10-CM

## 2025-03-06 PROCEDURE — 99233 SBSQ HOSP IP/OBS HIGH 50: CPT

## 2025-03-06 PROCEDURE — 71045 X-RAY EXAM CHEST 1 VIEW: CPT | Mod: 26

## 2025-03-06 RX ORDER — ACETAMINOPHEN 500 MG/5ML
1000 LIQUID (ML) ORAL ONCE
Refills: 0 | Status: COMPLETED | OUTPATIENT
Start: 2025-03-06 | End: 2025-03-05

## 2025-03-06 RX ORDER — APIXABAN 2.5 MG/1
2.5 TABLET, FILM COATED ORAL
Refills: 0 | Status: DISCONTINUED | OUTPATIENT
Start: 2025-03-06 | End: 2025-03-17

## 2025-03-06 RX ORDER — ACETAMINOPHEN 500 MG/5ML
1000 LIQUID (ML) ORAL ONCE
Refills: 0 | Status: COMPLETED | OUTPATIENT
Start: 2025-03-06 | End: 2025-03-06

## 2025-03-06 RX ORDER — ONDANSETRON HCL/PF 4 MG/2 ML
4 VIAL (ML) INJECTION ONCE
Refills: 0 | Status: COMPLETED | OUTPATIENT
Start: 2025-03-06 | End: 2025-03-06

## 2025-03-06 RX ADMIN — Medication 81 MILLIGRAM(S): at 14:41

## 2025-03-06 RX ADMIN — Medication 500 MILLIGRAM(S): at 14:42

## 2025-03-06 RX ADMIN — Medication 3 MILLILITER(S): at 18:15

## 2025-03-06 RX ADMIN — HEPARIN SODIUM 12.5 UNIT(S)/HR: 1000 INJECTION INTRAVENOUS; SUBCUTANEOUS at 09:18

## 2025-03-06 RX ADMIN — Medication 1 APPLICATION(S): at 05:54

## 2025-03-06 RX ADMIN — MEROPENEM 100 MILLIGRAM(S): 1 INJECTION INTRAVENOUS at 23:36

## 2025-03-06 RX ADMIN — Medication 3 MILLILITER(S): at 23:48

## 2025-03-06 RX ADMIN — Medication 1 APPLICATION(S): at 21:00

## 2025-03-06 RX ADMIN — Medication 3 MILLILITER(S): at 05:04

## 2025-03-06 RX ADMIN — METHOCARBAMOL 500 MILLIGRAM(S): 500 TABLET, FILM COATED ORAL at 05:53

## 2025-03-06 RX ADMIN — Medication 5 MILLIGRAM(S): at 22:28

## 2025-03-06 RX ADMIN — Medication 400 MILLIGRAM(S): at 11:07

## 2025-03-06 RX ADMIN — Medication 4 MILLIGRAM(S): at 13:10

## 2025-03-06 RX ADMIN — MIRTAZAPINE 7.5 MILLIGRAM(S): 30 TABLET, FILM COATED ORAL at 22:28

## 2025-03-06 RX ADMIN — Medication 300 MILLIGRAM(S): at 14:42

## 2025-03-06 RX ADMIN — METHOCARBAMOL 500 MILLIGRAM(S): 500 TABLET, FILM COATED ORAL at 14:42

## 2025-03-06 RX ADMIN — BUDESONIDE 0.5 MILLIGRAM(S): 0.25 SUSPENSION RESPIRATORY (INHALATION) at 18:13

## 2025-03-06 RX ADMIN — APIXABAN 2.5 MILLIGRAM(S): 2.5 TABLET, FILM COATED ORAL at 17:21

## 2025-03-06 RX ADMIN — Medication 100 MILLIGRAM(S): at 22:28

## 2025-03-06 RX ADMIN — Medication 1000 MILLIGRAM(S): at 12:00

## 2025-03-06 RX ADMIN — Medication 3 MILLILITER(S): at 11:27

## 2025-03-06 RX ADMIN — ATORVASTATIN CALCIUM 80 MILLIGRAM(S): 80 TABLET, FILM COATED ORAL at 22:28

## 2025-03-06 RX ADMIN — SODIUM ZIRCONIUM CYCLOSILICATE 10 GRAM(S): 5 POWDER, FOR SUSPENSION ORAL at 09:19

## 2025-03-06 RX ADMIN — Medication 1 SPRAY(S): at 05:53

## 2025-03-06 RX ADMIN — BUDESONIDE 0.5 MILLIGRAM(S): 0.25 SUSPENSION RESPIRATORY (INHALATION) at 05:04

## 2025-03-06 RX ADMIN — Medication 1 TABLET(S): at 14:41

## 2025-03-06 RX ADMIN — METOPROLOL SUCCINATE 25 MILLIGRAM(S): 50 TABLET, EXTENDED RELEASE ORAL at 17:21

## 2025-03-06 RX ADMIN — METHOCARBAMOL 500 MILLIGRAM(S): 500 TABLET, FILM COATED ORAL at 22:28

## 2025-03-06 RX ADMIN — Medication 40 MILLIGRAM(S): at 14:41

## 2025-03-06 NOTE — PROGRESS NOTE ADULT - ASSESSMENT
55 yo M with multiple medical problems including CAD s/p PCI in 2024 s/p CABG x 3 on 24    1/: Intubated for hypoxia & left lung white out s/p awake bronch with removal of copious mucopurulent secretions  : s/p IABP insertion, sepsis/septic shock due to E coli   ESBL pneumonia, collapsed Left Lung, s/p multiple bronch    tracheostomy tube placement    VRE E. Faecium Bcx   +HSV PCR BAL   tissue cx from back abscess - Serratia/MRSA  - - intermittent bradycardia/junctional episodes with hypotension  2/3: off , off theophylline. iHD 1L UF  : bronch for Left lung collapse wound vac, 2decho w/ moderate pericardial effusion - similar as before, US abd: small - moderate ascites - monitor at this time.  Wound vac placed for sacral wounds  : iHD-1L UF  : bronch today off positive pressure   : concern for left food drop   2/10 : no acute issues - CXR shows improving left sided aeration, pt subjectively reports having difficulty while lying flat  : s/p Paracentesis 3.5 L removed, leukocytosis   : Ascites fluid PMN < 250 (less likely SBP)   sniff test yesterday showed paradoxical diaphragmatic motion    : mucus plugging but able to clear airway with aggressive pulmonary toileting.   : no vent requirement overnight, able to mobilize secretion with pulm toileting  hyperkalemia post HD - workup for possible recirculation underway   : On TC X 48 hours - ambulating well, no mucus plugging events  : HFTC x24hrs (40L 30%)  : iHD 1.5L UF  : iHD 2L  : trach downsized to 7, bronch  - cultures growing ESBL E Coli, sacral wound vac changed   3/ Tx to SDU, +PW isolated, +sacral wound vac in place, + Tube feeds running, next HD scheduled for tomorrow

## 2025-03-06 NOTE — PROGRESS NOTE ADULT - SUBJECTIVE AND OBJECTIVE BOX
MR#[id number]  PATIENT NAME:RAAD CANO    DATE OF SERVICE: 03-06-25 @ 0930  Patient was seen and examined by Solo Quick MD on    03-06-25 @ 0930  Interim events noted.Consultant notes ,Labs,Telemetry reviewed by me       HOSPITAL COURSE: HPI:  54M with PMH of HTN, HLD, ESRD on HD MWF via LUE AVF, ascites (requiring repeat paracenteses q4-6wks), NICM with moderate LV dysfunction w/ EF 35-40%(2023) and CAD s/p atherectomy + SALLIE to D1(4/11/2024) presents to [facility] transferred from [facility]. Patient initially presented to [facility] ED with shortness of breath. Of note he was recently admitted from 09/27-12/02 for acute cholecystitis s/p cholecystectomy. In [facility] ED, pt was hypoxic to 86% on RA, trop 1.7K, EKG no new changes, CXR fluid overload. Admitted for AHRF 2/2 fluid overload. Pt received HD on 12/18, 12/19, 12/20 and 12/22. DAPT was resumed, TTE showed improvement in LVEF to 40-45%. Stress test was abnormal with 1mm inferior STD, reversible ischemia in the inferior wall and fixed defects in the lateral, anterior and apical walls with post stress EF of 24%. Pt was then transferred to [facility] for cardiac cath which showed pLAD 70% ISR, mLCx 70%, D1 severe dz. Patient now transferred to [facility] CTS Dr. Rivers for CABG eval. Patient denies any current SOB or chest pain on admission. Otherwise denies headaches, abdominal pain, urinary or bowel changes, fevers, chills, N/V/D, sick contacts.  (24 Dec 2024 05:07)      INTERIM EVENTS:Patient seen at bedside ,interim events noted.      PMH -reviewed admission note, no change since admission  HEART FAILURE: Acute[ ]Chronic[ ] Systolic[ ] Diastolic[ ] Combined Systolic and Diastolic[ ]  CAD[ ] CABG[ ] PCI[ ]  DEVICES[ ] PPM[ ] ICD[ ] ILR[ ]  ATRIAL FIBRILLATION[ ] Paroxysmal[ ] Permanent[ ] CHADS2-[  ]  GHASSAN[ ] CKD1[ ] CKD2[ ] CKD3[ ] CKD4[ ] ESRD[ ]  COPD[ ] HTN[ ]   DM[ ] Type1[ ] Type 2[ ]   CVA[ ] Paresis[ ]    AMBULATION: Assisted[ ] Cane/walker[ ] Independent[ ]    MEDICATIONS  (STANDING):  apixaban 2.5 milliGRAM(s) Oral two times a day  ascorbic acid 500 milliGRAM(s) Oral daily  aspirin  chewable 81 milliGRAM(s) Oral daily  atorvastatin 80 milliGRAM(s) Oral at bedtime  buDESOnide    Inhalation Suspension 0.5 milliGRAM(s) Inhalation two times a day  chlorhexidine 2% Cloths 1 Application(s) Topical daily  chlorhexidine 4% Liquid 1 Application(s) Topical <User Schedule>  dextrose 50% Injectable 50 milliLiter(s) IV Push every 15 minutes  epoetin ignacia (EPOGEN) Injectable 47038 Unit(s) IV Push <User Schedule>  ferrous    sulfate Liquid 300 milliGRAM(s) Enteral Tube daily  insulin lispro (ADMELOG) corrective regimen sliding scale   SubCutaneous every 6 hours  melatonin 5 milliGRAM(s) Oral at bedtime  meropenem  IVPB 500 milliGRAM(s) IV Intermittent every 24 hours  methocarbamol 500 milliGRAM(s) Oral every 8 hours  metoprolol tartrate 25 milliGRAM(s) Oral two times a day  mirtazapine 7.5 milliGRAM(s) Oral at bedtime  Nephro-elicia 1 Tablet(s) Oral daily  pantoprazole  Injectable 40 milliGRAM(s) IV Push daily  sodium chloride 0.65% Nasal 1 Spray(s) Both Nostrils every 12 hours  sodium chloride 0.9% for Nebulization 3 milliLiter(s) Nebulizer every 6 hours  sodium chloride 0.9%. 1000 milliLiter(s) (10 mL/Hr) IV Continuous <Continuous>  sodium zirconium cyclosilicate 10 Gram(s) Oral <User Schedule>  traZODone 100 milliGRAM(s) Oral at bedtime    MEDICATIONS  (PRN):  acetaminophen     Tablet .. 650 milliGRAM(s) Oral every 6 hours PRN Mild Pain (1 - 3)  lidocaine/prilocaine Cream 1 Application(s) Topical daily PRN pre-HD  sodium chloride 0.9% lock flush 10 milliLiter(s) IV Push every 1 hour PRN Pre/post blood products, medications, blood draw, and to maintain line patency            REVIEW OF SYSTEMS:  Constitutional: [ ] fever, [ ]weight loss,  [ ]fatigue [ ]weight gain  Eyes: [ ] visual changes  Respiratory: [ ]shortness of breath;  [ ] cough, [ ]wheezing, [ ]chills, [ ]hemoptysis  Cardiovascular: [ ] chest pain, [ ]palpitations, [ ]dizziness,  [ ]leg swelling[ ]orthopnea[ ]PND  Gastrointestinal: [ ] abdominal pain, [ ]nausea, [ ]vomiting,  [ ]diarrhea [ ]Constipation [ ]Melena  Genitourinary: [ ] dysuria, [ ] hematuria [ ]Vigil  Neurologic: [ ] headaches [ ] tremors[ ]weakness [ ]Paralysis Right[ ] Left[ ]  Skin: [ ] itching, [ ]burning, [ ] rashes  Endocrine: [ ] heat or cold intolerance  Musculoskeletal: [ ] joint pain or swelling; [ ] muscle, back, or extremity pain  Psychiatric: [ ] depression, [ ]anxiety, [ ]mood swings, or [ ]difficulty sleeping  Hematologic: [ ] easy bruising, [ ] bleeding gums    [ ] All remaining systems negative except as per above.   [ ]Unable to obtain.  [x] No change in ROS since admission      Vital Signs Last 24 Hrs  T(C): 36.7 (06 Mar 2025 20:00), Max: 36.9 (06 Mar 2025 00:00)  T(F): 98 (06 Mar 2025 20:00), Max: 98.5 (06 Mar 2025 00:00)  HR: 62 (06 Mar 2025 21:04) (60 - 110)  BP: 136/55 (06 Mar 2025 20:36) (100/48 - 137/52)  BP(mean): 79 (06 Mar 2025 20:36) (65 - 88)  RR: 18 (06 Mar 2025 21:04) (13 - 27)  SpO2: 95% (06 Mar 2025 21:04) (82% - 100%)    Parameters below as of 06 Mar 2025 21:04  Patient On (Oxygen Delivery Method): tracheostomy collar    O2 Concentration (%): 40  I&O's Summary    05 Mar 2025 07:01  -  06 Mar 2025 07:00  --------------------------------------------------------  IN: 1142.5 mL / OUT: 2000 mL / NET: -857.5 mL    06 Mar 2025 07:01  -  06 Mar 2025 21:54  --------------------------------------------------------  IN: 355 mL / OUT: 0 mL / NET: 355 mL        PHYSICAL EXAM:  General: No acute distress BMI-  HEENT: EOMI, PERRL  Neck: Supple, [ ] JVD  Lungs: Equal air entry bilaterally; [ ] rales [ ] wheezing [ ] rhonchi  Heart: Regular rate and rhythm; [x ] murmur   2/6 [ x] systolic [ ] diastolic [ ] radiation[ ] rubs [ ]  gallops  Abdomen: Nontender, bowel sounds present  Extremities: No clubbing, cyanosis, [ ] edema [ ]Pulses  equal and intact  Nervous system:  Alert & Oriented X3, no focal deficits  Psychiatric: Normal affect  Skin: No rashes or lesions    LABS:  03-05    137  |  96  |  28[H]  ----------------------------<  146[H]  3.9   |  27  |  4.92[H]    Ca    9.0      05 Mar 2025 00:29  Phos  4.4     03-05  Mg     2.4     03-05    TPro  6.4  /  Alb  2.9[L]  /  TBili  0.4  /  DBili  x   /  AST  18  /  ALT  13  /  AlkPhos  87  03-05    Creatinine Trend: 4.92<--, 3.99<--, 5.47<--, 4.89<--, 4.32<--, 3.15<--                        8.0    7.77  )-----------( 240      ( 05 Mar 2025 00:29 )             25.7     PTT - ( 05 Mar 2025 00:29 )  PTT:63.2 sec

## 2025-03-06 NOTE — PROGRESS NOTE ADULT - PROBLEM SELECTOR PLAN 1
Tx to SDU  + PW isolated  Trach collar in place #7 cuffless  + Tube feeds continuous  consult ENT in AM to re-evaluate  Daily CXR in AM   only AM labs on HD days   Q4 Suction  Eliquis started   + ESBL ECOLI ID following meropenem to complete today 3/6   Cough and deep breathe, Incentive Spirometry Q1h, Aggressive Chest PT, Pulm Toilet  C/W GI prophylaxis  DVT prophylaxis: on eliquis

## 2025-03-06 NOTE — PROGRESS NOTE ADULT - PROBLEM SELECTOR PLAN 1
Tx to SDU  + PW isolated  Trach collar in place #7 cuffless  + Tube feeds continuous  consult ENT in AM to re-evaluate  Daily CXR in AM   only AM labs on HD days   Q4 Suction  Eliquis started   + ESBL ECOLI ID following meropenem to complete today 3/6   Cough and deep breathe, Incentive Spirometry Q1h, Aggressive Chest PT, Pulm Toilet  C/W GI prophylaxis  DVT prophylaxis: on eliquis Tx to SDU  + PW isolated  Trach collar in place #7 cuffless  + Tube feeds continuous  consult ENT in AM to re-evaluate  Daily CXR in AM   only AM labs on HD days   Q4 Suction  Eliquis for Afib/flutter  + ESBL ECOLI ID following meropenem to complete today 3/6   Cough and deep breathe, Incentive Spirometry Q1h, Aggressive Chest PT, Pulm Toilet  C/W GI prophylaxis  DVT prophylaxis: on eliquis

## 2025-03-06 NOTE — PROGRESS NOTE ADULT - SUBJECTIVE AND OBJECTIVE BOX
[facility]    Dr. Nica Styles     Office [phone] (9 am to 5 pm)  Service: [phone] (5pm to 9am)  Also Available on TEAMS      RENAL PROGRESS NOTE: DATE OF SERVICE 03-06-25 @ 10:18    Patient is a 54y old  Male who presents with a chief complaint of CAD s/p CABG (04 Mar 2025 06:22)      Patient seen and examined at bedside. No chest pain/sob    VITALS:  T(F): 98.3 (03-06-25 @ 04:00), Max: 98.8 (03-05-25 @ 20:00)  HR: 61 (03-06-25 @ 09:00)  BP: 116/53 (03-06-25 @ 09:00)  RR: 14 (03-06-25 @ 09:00)  SpO2: 95% (03-06-25 @ 09:00)  Wt(kg): --    03-05 @ 07:01  -  03-06 @ 07:00  --------------------------------------------------------  IN: 1142.5 mL / OUT: 2000 mL / NET: -857.5 mL    03-06 @ 07:01  -  03-06 @ 10:18  --------------------------------------------------------  IN: 52.5 mL / OUT: 0 mL / NET: 52.5 mL          PHYSICAL EXAM:  Constitutional: NAD  Neck: No JVD  Respiratory: CTAB, no wheezes, rales or rhonchi  Cardiovascular: S1, S2, RRR  Gastrointestinal: BS+, soft, NT/ND  Extremities: No peripheral edema    Hospital Medications:   MEDICATIONS  (STANDING):  ascorbic acid 500 milliGRAM(s) Oral daily  aspirin  chewable 81 milliGRAM(s) Oral daily  atorvastatin 80 milliGRAM(s) Oral at bedtime  buDESOnide    Inhalation Suspension 0.5 milliGRAM(s) Inhalation two times a day  chlorhexidine 2% Cloths 1 Application(s) Topical daily  chlorhexidine 4% Liquid 1 Application(s) Topical <User Schedule>  dextrose 50% Injectable 50 milliLiter(s) IV Push every 15 minutes  epoetin ignacia (EPOGEN) Injectable 26989 Unit(s) IV Push <User Schedule>  ferrous    sulfate Liquid 300 milliGRAM(s) Enteral Tube daily  heparin  Infusion 1300 Unit(s)/Hr (12.5 mL/Hr) IV Continuous <Continuous>  insulin lispro (ADMELOG) corrective regimen sliding scale   SubCutaneous every 6 hours  melatonin 5 milliGRAM(s) Oral at bedtime  meropenem  IVPB 500 milliGRAM(s) IV Intermittent every 24 hours  methocarbamol 500 milliGRAM(s) Oral every 8 hours  metoprolol tartrate 25 milliGRAM(s) Oral two times a day  mirtazapine 7.5 milliGRAM(s) Oral at bedtime  Nephro-elicia 1 Tablet(s) Oral daily  pantoprazole  Injectable 40 milliGRAM(s) IV Push daily  sodium chloride 0.65% Nasal 1 Spray(s) Both Nostrils every 12 hours  sodium chloride 0.9% for Nebulization 3 milliLiter(s) Nebulizer every 6 hours  sodium chloride 0.9%. 1000 milliLiter(s) (10 mL/Hr) IV Continuous <Continuous>  sodium zirconium cyclosilicate 10 Gram(s) Oral <User Schedule>  traZODone 100 milliGRAM(s) Oral at bedtime      LABS:  03-05    137  |  96  |  28[H]  ----------------------------<  146[H]  3.9   |  27  |  4.92[H]    Ca    9.0      05 Mar 2025 00:29  Phos  4.4     03-05  Mg     2.4     03-05    TPro  6.4  /  Alb  2.9[L]  /  TBili  0.4  /  DBili      /  AST  18  /  ALT  13  /  AlkPhos  87  03-05    Creatinine Trend: 4.92 <--, 3.99 <--, 5.47 <--, 4.89 <--, 4.32 <--, 3.15 <--, 4.51 <--                                8.0    7.77  )-----------( 240      ( 05 Mar 2025 00:29 )             25.7     Urine Studies:  Urinalysis - [03-05-25 @ 00:29]      Color  / Appearance  / SG  / pH       Gluc 146 / Ketone   / Bili  / Urobili        Blood  / Protein  / Leuk Est  / Nitrite       RBC  / WBC  / Hyaline  / Gran  / Sq Epi  / Non Sq Epi  / Bacteria       Iron 29, TIBC 143, %sat 20      [12-19-24 @ 05:00]  Ferritin 904      [12-19-24 @ 05:00]  TSH 4.75      [12-24-24 @ 06:50]        RADIOLOGY & ADDITIONAL STUDIES:

## 2025-03-06 NOTE — PROGRESS NOTE ADULT - ASSESSMENT
54M with PMH of HTN, HLD, ESRD on HD MWF via LUE AVF, ascites (requiring repeat paracenteses q4-6wks), NICM with moderate LV dysfunction w/ EF 35-40%(2023) and CAD s/p atherectomy + SALLIE to D1(4/11/2024) presents to [facility] transferred from [facility] for CABG eval  Nephro on board for HD needs.    ESRD on HD   Nephrologist Dr. Bailey  [facility] Schedule / Access:  LUE AVF  Center: [facility]  Pt s/p HD on 2/19 and 2/21.  s/p HD 2/24 uf 1.5L  S/p HD on 02/26 2 L UF and 02/28 2 L UF again   S/p HD on 03/03 and 03/5  HD tomm  Keep MAP>65  Renal Diet  HD consent in chart     Hyper/Hypokalemia  Pt intermittently Hyperkalemia  HD as scheduled  now improved  Low K diet.  C/W Lokelma 10gm on nonHD days.  Monitor K.    HTN  BP fluctuating; controlled.  UF w/ HD as BP permits.  Low sodium diet.  Management per ICU  Monitor BP     CKD MBD  Check PTH   Low PO4 diet.  Not on binder.  Monitor Ca , PO4 daily     Anemia  CRISTIANE with HD  Repeat iron studies.  Transfuse if hgb <7    CAD with multivessel disease  CTICU following  s/p CABG 12/30    Hypoxic respiratory failure requiring Trach  Per surgery  S/p bronchoscopy, pulm following

## 2025-03-06 NOTE — PROGRESS NOTE ADULT - SUBJECTIVE AND OBJECTIVE BOX
HPI:  54M with PMH of HTN, HLD, ESRD on HD MWF via LUE AVF, ascites (requiring repeat paracenteses q4-6wks), NICM with moderate LV dysfunction w/ EF 35-40%(2023) and CAD s/p atherectomy + SALLIE to D1(4/11/2024) presents to [facility] transferred from [facility]. [facility]. Of note he was recently admitted from 09/27-12/02 for acute cholecystitis s/p [facility]olecystectomy. [facility] pt was hypoxic to 86% on RA, trop 1.7K, EKG no new changes, CXR fluid overload. Admitted for AHRF 2/2 fluid overload. Pt received HD on 12/18, 12/19, 12/20 and 12/22. DAPT was resumed, TTE showed improvement in LVEF to 40-45%. Stress test was abnormal with 1mm inferior STD, reversible ischemia in the inferior wall and fixed defects in the lateral, anterior and apical walls with post stress EF of 24%. Pt was then transferred to [facility] for cardiac cath which showed pLAD 70% ISR, mLCx 70%, D1 severe dz. Patient now transferred to [facility] CTS Dr. Rivers for CABG eval. Patient denies any current SOB or chest pain on admission. Otherwise denies headaches, abdominal pain, urinary or bowel changes, fevers, chills, N/V/D, sick contacts.  (24 Dec 2024 05:07)    Patient underwent C3L 12/30. Post operatively, patient developed worsening cardiogenic shock requiring inotropic support and an IABP. In addition, he was reintubated 1/02.     Patient seen and examined at the bedside.    Remained critically ill on continuous ICU monitoring.    OBJECTIVE:  Vital Signs Last 24 Hrs  T(C): 36.8 (06 Mar 2025 04:00), Max: 37.1 (05 Mar 2025 20:00)  T(F): 98.3 (06 Mar 2025 04:00), Max: 98.8 (05 Mar 2025 20:00)  HR: 61 (06 Mar 2025 09:00) (60 - 112)  BP: 116/53 (06 Mar 2025 09:00) (99/42 - 144/65)  BP(mean): 76 (06 Mar 2025 09:00) (56 - 94)  RR: 14 (06 Mar 2025 09:00) (12 - 27)  SpO2: 95% (06 Mar 2025 09:00) (79% - 100%)    Parameters below as of 06 Mar 2025 08:58  Patient On (Oxygen Delivery Method): tracheostomy collar    O2 Concentration (%): 40    Physical Exam:   General: NAD   Neurology: nonfocal   Eyes: bilateral pupils equal and reactive   ENT/Neck: Neck supple, trachea midline, No JVD   Respiratory: Clear bilaterally   CV: S1S2, no murmurs        [x] Sternal dressing        [x] A fib/A flutter  Abdominal: Soft, NT, ND +BS  Extremities: 1-2+ pedal edema noted, + peripheral pulses   Skin: No Rashes, Hematoma, Ecchymosis                           Assessment:  54 yr male HTN / HLD / ESRD on HD MWF via LUE AVF / ascites (requiring repeat paracenteses q4-6wks) / NICM with moderate LV dysfunction w/ EF 35-40%(2023) and CAD s/p atherectomy + SALLIE to D1(4/11/2024).  CAD with instent restenosis of LAD with D1 and LCx disease s/p CABG x 3 with free LIMA-LAD, vein to diag and Left     Left ventricular systolic and diastolic dysfunction  Severe tricuspid regurgitation, pulmonary HTN  Acute postoperative respiratory insufficiency  ESRD on iHD  Hyperkalemia  Acute blood loss anemia  Stress hyperglycemia    Plan:   ***Neuro***  Addressed analgesic regimen with Tylenol and Robaxin to optimize function. Melatonin and Trazadone nightly to maintain day and night cycle.    ***Cardiovascular***  Patient admitted for SOB and fluid overload and was found to have CAD s/p CABG x3 on 12/30. Invasive hemodynamic monitoring with a central venous catheter & an A-line were required for the continuous central venous and MAP/BP monitoring to ensure adequate cardiovascular support. Beta blocker for rate control. ASA continued for graft occlusion-thromboembolism prophylaxis. Lipitor was also continued for long term graft patency.     ***Pulmonary***  Respiratory status required supplemental oxygen with tracheostomy collar, nebulizers, close monitoring of breathing pattern and respiratory rate & the following of continuous pulse oximetry for support & to prevent decompensation    ***Heme***  Patient with acute blood loss anemia. Monitor hemoglobin and hematocrit levels. Heparin infusion continued for paroxysmal atrial fibrillation.    ***GI***  Continue tube feeds. Protonix for stress ulcer prophylaxis.    ***Renal***  Patient with end stage renal disease on hemodialysis. Received hemodialysis yesterday as scheduled for net negative 2 L. Optimize renal perfusion with adequate volume resuscitation and continued monitoring of urine output, fluid balance, electrolytes, and BUN/Creatinine.     ***ID***  Afebrile, white blood count within normal limits. Empiric coverage with Meropenem. Continue trending white blood count and monitoring fever curve.    ***Endocrine***  Metabolic stability, stress hyperglycemia required review and adjustment of regular Insulin sliding scale and glycemic regimen while following serial glucose levels to help achieve and maintain euglycemia         Patient requires continuous monitoring with bedside rhythm monitoring, pulse oximetry monitoring, and continuous central venous and arterial pressure monitoring; and intermittent blood gas analysis. Care plan discussed with the ICU care team.   Patient remained critical, at risk for life threatening decompensation.    I have spent 30 minutes providing critical care management to this patient.    By signing my name below, I, Baldemar Hernandez, attest that this documentation has been prepared under the direction and in the presence of Mari Gill MD   Electronically signed: Evelio Sevilla, 03-06-25 @ 09:51    I, Mari Gill, personally performed the services described in this documentation. All medical record entries made by the shubhamibe were at my direction and in my presence. I have reviewed the chart and agree that the record reflects my personal performance and is accurate and complete  Electronically signed: Mari Gill MD  HPI:  54M with PMH of HTN, HLD, ESRD on HD MWF via LUE AVF, ascites (requiring repeat paracenteses q4-6wks), NICM with moderate LV dysfunction w/ EF 35-40%(2023) and CAD s/p atherectomy + SALLIE to D1(4/11/2024) presents to [facility] transferred from [facility]. Patient initially presented to [facility] ED with shortness of breath. Of note he was recently admitted from 09/27-12/02 for acute cholecystitis s/p cholecystectomy. In [facility] ED, pt was hypoxic to 86% on RA, trop 1.7K, EKG no new changes, CXR fluid overload. Admitted for AHRF 2/2 fluid overload. Pt received HD on 12/18, 12/19, 12/20 and 12/22. DAPT was resumed, TTE showed improvement in LVEF to 40-45%. Stress test was abnormal with 1mm inferior STD, reversible ischemia in the inferior wall and fixed defects in the lateral, anterior and apical walls with post stress EF of 24%. Pt was then transferred to [facility] for cardiac cath which showed pLAD 70% ISR, mLCx 70%, D1 severe dz. Patient now transferred to [facility] CTS Dr. Rivers for CABG eval. Patient denies any current SOB or chest pain on admission. Otherwise denies headaches, abdominal pain, urinary or bowel changes, fevers, chills, N/V/D, sick contacts.  (24 Dec 2024 05:07)    Patient underwent C3L 12/30. Post operatively, patient developed worsening cardiogenic shock requiring inotropic support and an IABP. In addition, he was reintubated 1/02. He underwent tracheostomy 1/16 and trach exchange to a cuffless #7 on 2/28.    Patient seen and examined at the bedside.    Remained critically ill on continuous ICU monitoring.    OBJECTIVE:  Vital Signs Last 24 Hrs  T(C): 36.8 (06 Mar 2025 04:00), Max: 37.1 (05 Mar 2025 20:00)  T(F): 98.3 (06 Mar 2025 04:00), Max: 98.8 (05 Mar 2025 20:00)  HR: 61 (06 Mar 2025 09:00) (60 - 112)  BP: 116/53 (06 Mar 2025 09:00) (99/42 - 144/65)  BP(mean): 76 (06 Mar 2025 09:00) (56 - 94)  RR: 14 (06 Mar 2025 09:00) (12 - 27)  SpO2: 95% (06 Mar 2025 09:00) (79% - 100%)    Parameters below as of 06 Mar 2025 08:58  Patient On (Oxygen Delivery Method): tracheostomy collar    O2 Concentration (%): 40    Physical Exam:   General: Thin male, resting in bed   Neurology: nonfocal   Eyes: bilateral pupils equal and reactive   ENT/Neck: Neck supple, trachea midline, No JVD   Respiratory: Clear bilaterally   CV: S1S2, no murmurs        [x] Sternal dressing        [x] A fib/A flutter  Abdominal: Soft, NT, ND +BS  Extremities: trace pedal edema noted, + peripheral pulses   Skin: No Rashes, Hematoma, Ecchymosis                           Assessment:  54 yr male HTN / HLD / ESRD on HD MWF via LUE AVF / ascites (requiring repeat paracenteses q4-6wks) / NICM with moderate LV dysfunction w/ EF 35-40%(2023) and CAD s/p atherectomy + SALLIE to D1(4/11/2024).  CAD with instent restenosis of LAD with D1 and LCx disease s/p CABG x 3 with free LIMA-LAD, vein to diag and Left     Left ventricular systolic and diastolic dysfunction  Severe tricuspid regurgitation, pulmonary HTN  Acute postoperative respiratory failure  ESRD on iHD  Hyperkalemia  Acute blood loss anemia  Stress hyperglycemia    Plan:   ***Neuro***  Addressed analgesic regimen with Tylenol and Robaxin to optimize function. Melatonin and Trazadone nightly to maintain day and night cycle.    ***Cardiovascular***  Patient admitted for SOB and fluid overload and was found to have CAD s/p CABG x3 on 12/30. Continues now on twice daily Lopressor for hypertension, chronic systolic heart failure and rate control of chronic atrial fibrillation. Continuous EKG monitoring with intermittent non invasive blood pressure measurment to ensure adequate cardiovascular support. ASA continued for graft occlusion-thromboembolism prophylaxis. Lipitor was also continued for long term graft patency.     ***Pulmonary***  Respiratory status required supplemental oxygen with tracheostomy collar, nebulizers, close monitoring of breathing pattern and respiratory rate & the following of continuous pulse oximetry for support & to prevent decompensation. He has not required ventilator support for several weeks and tracheostomy downsized to cuffless #7 on 2/28. Patient develops intermittent left lung mucous plugging and collapse requiring ongoing pulmonary toilet and intermittent bronchoscopy. He is proactive with mobilization. Patient will require ENT follow up prior to decannulation due to subglottic edema noted during previous FEES.    ***Heme***  Patient with anemia due to ESRD. On epogen and iron. Monitor hemoglobin and hematocrit levels. Heparin infusion continued for paroxysmal atrial fibrillation and also suffices for DVT prophylaxis in addition to sequential compression devices.    ***GI***  Continue nocturnal enteral feeds to supplement caloric intake. Protonix for stress ulcer prophylaxis.    ***Renal***  Patient with end stage renal disease on hemodialysis. Received hemodialysis yesterday as scheduled for net negative 2 L. Optimize renal perfusion with adequate volume resuscitation and continued monitoring of urine output, fluid balance, electrolytes, and BUN/Creatinine.     ***ID***  Afebrile, white blood count within normal limits. E coli tracheobronchitis by BAL, 5 day course of Meropenem completes today. Continue trending white blood count and monitoring fever curve.    ***Endocrine***  Metabolic stability, stress hyperglycemia required review and adjustment of regular Insulin sliding scale and glycemic regimen while following serial glucose levels to help achieve and maintain euglycemia         Patient requires continuous monitoring with bedside rhythm monitoring and pulse oximetry monitoring. Care plan discussed with the ICU care team.     By signing my name below, I, Baldemar Hernandez, attest that this documentation has been prepared under the direction and in the presence of Mari Gill MD   Electronically signed: Autumn Sevilla, 03-06-25 @ 09:51    I, Mari Gill, personally performed the services described in this documentation. All medical record entries made by the autumn were at my direction and in my presence. I have reviewed the chart and agree that the record reflects my personal performance and is accurate and complete  Electronically signed: Mari Gill MD  HPI:  54M with PMH of HTN, HLD, ESRD on HD MWF via LUE AVF, ascites (requiring repeat paracenteses q4-6wks), NICM with moderate LV dysfunction w/ EF 35-40%(2023) and CAD s/p atherectomy + SALLIE to D1(4/11/2024) presents to [facility] transferred from [facility]. Patient initially presented to [facility] ED with shortness of breath. Of note he was recently admitted from 09/27-12/02 for acute cholecystitis s/p cholecystectomy. In [facility] ED, pt was hypoxic to 86% on RA, trop 1.7K, EKG no new changes, CXR fluid overload. Admitted for AHRF 2/2 fluid overload. Pt received HD on 12/18, 12/19, 12/20 and 12/22. DAPT was resumed, TTE showed improvement in LVEF to 40-45%. Stress test was abnormal with 1mm inferior STD, reversible ischemia in the inferior wall and fixed defects in the lateral, anterior and apical walls with post stress EF of 24%. Pt was then transferred to [facility] for cardiac cath which showed pLAD 70% ISR, mLCx 70%, D1 severe dz. Patient now transferred to [facility] CTS Dr. Rivers for CABG eval. Patient denies any current SOB or chest pain on admission. Otherwise denies headaches, abdominal pain, urinary or bowel changes, fevers, chills, N/V/D, sick contacts.  (24 Dec 2024 05:07)    Patient underwent C3L 12/30. Post operatively, patient developed worsening cardiogenic shock requiring inotropic support and an IABP. In addition, he was reintubated 1/02. He underwent tracheostomy 1/16 and trach exchange to a cuffless #7 on 2/28.    Patient seen and examined at the bedside.    Remained critically ill on continuous ICU monitoring.    OBJECTIVE:  Vital Signs Last 24 Hrs  T(C): 36.8 (06 Mar 2025 04:00), Max: 37.1 (05 Mar 2025 20:00)  T(F): 98.3 (06 Mar 2025 04:00), Max: 98.8 (05 Mar 2025 20:00)  HR: 61 (06 Mar 2025 09:00) (60 - 112)  BP: 116/53 (06 Mar 2025 09:00) (99/42 - 144/65)  BP(mean): 76 (06 Mar 2025 09:00) (56 - 94)  RR: 14 (06 Mar 2025 09:00) (12 - 27)  SpO2: 95% (06 Mar 2025 09:00) (79% - 100%)    Parameters below as of 06 Mar 2025 08:58  Patient On (Oxygen Delivery Method): tracheostomy collar    O2 Concentration (%): 40    Physical Exam:   General: Thin male, resting in bed   Neurology: nonfocal   Eyes: bilateral pupils equal and reactive   ENT/Neck: Neck supple, trachea midline, No JVD   Respiratory: Clear bilaterally   CV: S1S2, no murmurs        [x] Sternal dressing        [x] A fib/A flutter  Abdominal: Soft, NT, ND +BS  Extremities: trace pedal edema noted, + peripheral pulses   Skin: No Rashes, Hematoma, Ecchymosis                           Assessment:  54 yr male HTN / HLD / ESRD on HD MWF via LUE AVF / ascites (requiring repeat paracenteses q4-6wks) / NICM with moderate LV dysfunction w/ EF 35-40%(2023) and CAD s/p atherectomy + SALLIE to D1(4/11/2024).  CAD with instent restenosis of LAD with D1 and LCx disease s/p CABG x 3 with free LIMA-LAD, vein to diag and Left     Left ventricular systolic and diastolic dysfunction  Severe tricuspid regurgitation, pulmonary HTN  Acute postoperative respiratory failure  ESRD on iHD  Hyperkalemia  Acute blood loss anemia  Stress hyperglycemia    Plan:   ***Neuro***  Addressed analgesic regimen with Tylenol and Robaxin to optimize function. Melatonin and Trazadone nightly to maintain day and night cycle.    ***Cardiovascular***  Patient admitted for SOB and fluid overload and was found to have CAD s/p CABG x3 on 12/30. Continues now on twice daily Lopressor for hypertension, chronic systolic heart failure and rate control of chronic atrial fibrillation. Continuous EKG monitoring with intermittent non invasive blood pressure measurment to ensure adequate cardiovascular support. ASA continued for graft occlusion-thromboembolism prophylaxis. Lipitor was also continued for long term graft patency.     ***Pulmonary***  Respiratory status required supplemental oxygen with tracheostomy collar, nebulizers, close monitoring of breathing pattern and respiratory rate & the following of continuous pulse oximetry for support & to prevent decompensation. He has not required ventilator support for several weeks and tracheostomy downsized to cuffless #7 on 2/28. Patient develops intermittent left lung mucous plugging and collapse requiring ongoing pulmonary toilet and intermittent bronchoscopy. He is proactive with mobilization. Patient will require ENT follow up prior to decannulation due to subglottic edema noted during previous FEES.    ***Heme***  Patient with anemia due to ESRD. On epogen and iron. Monitor hemoglobin and hematocrit levels. Heparin infusion continued for paroxysmal atrial fibrillation and also suffices for DVT prophylaxis in addition to sequential compression devices.    ***GI***  Continue nocturnal enteral feeds. Protonix for stress ulcer prophylaxis.    ***Renal***  Patient with end stage renal disease on hemodialysis. Received hemodialysis yesterday as scheduled for net negative 2 L. Optimize renal perfusion with adequate volume resuscitation and continued monitoring of urine output, fluid balance, electrolytes, and BUN/Creatinine.     ***ID***  Afebrile, white blood count within normal limits. E coli tracheobronchitis by BAL, 5 day course of Meropenem completes today. Continue trending white blood count and monitoring fever curve.    ***Endocrine***  Metabolic stability, stress hyperglycemia required review and adjustment of regular Insulin sliding scale and glycemic regimen while following serial glucose levels to help achieve and maintain euglycemia         Patient requires continuous monitoring with bedside rhythm monitoring and pulse oximetry monitoring. Care plan discussed with the ICU care team.     By signing my name below, IBaldemar, attest that this documentation has been prepared under the direction and in the presence of Mari Gill MD   Electronically signed: Evelio Sevilla, 03-06-25 @ 09:51    I, Mari Gill, personally performed the services described in this documentation. All medical record entries made by the shubhamibdarlene were at my direction and in my presence. I have reviewed the chart and agree that the record reflects my personal performance and is accurate and complete  Electronically signed: Mari Gill MD

## 2025-03-06 NOTE — PROGRESS NOTE ADULT - ASSESSMENT
53 yo M with multiple medical problems including CAD s/p PCI in 2024 s/p CABG x 3 on 24    1/: Intubated for hypoxia & left lung white out s/p awake bronch with removal of copious mucopurulent secretions  : s/p IABP insertion, sepsis/septic shock due to E coli   ESBL pneumonia, collapsed Left Lung, s/p multiple bronch    tracheostomy tube placement    VRE E. Faecium Bcx   +HSV PCR BAL   tissue cx from back abscess - Serratia/MRSA  - - intermittent bradycardia/junctional episodes with hypotension  2/3: off , off theophylline. iHD 1L UF  : bronch for Left lung collapse wound vac, 2decho w/ moderate pericardial effusion - similar as before, US abd: small - moderate ascites - monitor at this time.  Wound vac placed for sacral wounds  : iHD-1L UF  : bronch today off positive pressure   : concern for left food drop   2/10 : no acute issues - CXR shows improving left sided aeration, pt subjectively reports having difficulty while lying flat  : s/p Paracentesis 3.5 L removed, leukocytosis   : Ascites fluid PMN < 250 (less likely SBP)   sniff test yesterday showed paradoxical diaphragmatic motion    : mucus plugging but able to clear airway with aggressive pulmonary toileting.   : no vent requirement overnight, able to mobilize secretion with pulm toileting  hyperkalemia post HD - workup for possible recirculation underway   : On TC X 48 hours - ambulating well, no mucus plugging events  : HFTC x24hrs (40L 30%)  : iHD 1.5L UF  : iHD 2L  : trach downsized to 7, bronch  - cultures growing ESBL E Coli, sacral wound vac changed   3/ Tx to SDU, +PW isolated, +sacral wound vac in place, + Tube feeds running, next HD scheduled for tomorrow

## 2025-03-06 NOTE — PROGRESS NOTE ADULT - SUBJECTIVE AND OBJECTIVE BOX
Subjective: Patient seen and examine at bedside, Denies any current SOB, chest pains, headaches, abdominal pain    Telemetry:   Overnight Events: no acute events    Vital Signs Last 24 Hrs  T(C): 36.8 (25 @ 17:00), Max: 37.1 (25 @ 20:00)  T(F): 98.2 (25 @ 17:00), Max: 98.8 (25 @ 20:00)  HR: 70 (25 @ 18:18) (60 - 112)  BP: 123/56 (25 @ 18:00) (100/48 - 144/65)  RR: 16 (25 @ 18:00) (13 - 27)  SpO2: 100% (25 @ 18:18) (79% - 100%)             07:01  -   @ 07:00  --------------------------------------------------------  IN: 1142.5 mL / OUT: 2000 mL / NET: -857.5 mL     @ 07:01  -   @ 18:59  --------------------------------------------------------  IN: 325 mL / OUT: 0 mL / NET: 325 mL     Daily Weight in k.2 (06 Mar 2025 00:00)  Admit Wt: Drug Dosing Weight  Height (cm): 180.3 (30 Dec 2024 12:01)  Weight (kg): 85.1 (30 Dec 2024 12:01)  BMI (kg/m2): 26.2 (30 Dec 2024 12:01)  BSA (m2): 2.05 (30 Dec 2024 12:01)      CAPILLARY BLOOD GLUCOSE  138 (06 Mar 2025 06:00)  114 (06 Mar 2025 00:00)    POCT Blood Glucose.: 107 mg/dL (06 Mar 2025 17:17)  POCT Blood Glucose.: 106 mg/dL (06 Mar 2025 13:34)  POCT Blood Glucose.: 138 mg/dL (06 Mar 2025 06:26)  POCT Blood Glucose.: 114 mg/dL (06 Mar 2025 00:36)          acetaminophen     Tablet .. 650 milliGRAM(s) Oral every 6 hours PRN  apixaban 2.5 milliGRAM(s) Oral two times a day  ascorbic acid 500 milliGRAM(s) Oral daily  aspirin  chewable 81 milliGRAM(s) Oral daily  atorvastatin 80 milliGRAM(s) Oral at bedtime  buDESOnide    Inhalation Suspension 0.5 milliGRAM(s) Inhalation two times a day  chlorhexidine 2% Cloths 1 Application(s) Topical daily  chlorhexidine 4% Liquid 1 Application(s) Topical <User Schedule>  dextrose 50% Injectable 50 milliLiter(s) IV Push every 15 minutes  epoetin ignacia (EPOGEN) Injectable 67165 Unit(s) IV Push <User Schedule>  ferrous    sulfate Liquid 300 milliGRAM(s) Enteral Tube daily  insulin lispro (ADMELOG) corrective regimen sliding scale   SubCutaneous every 6 hours  lidocaine/prilocaine Cream 1 Application(s) Topical daily PRN  melatonin 5 milliGRAM(s) Oral at bedtime  meropenem  IVPB 500 milliGRAM(s) IV Intermittent every 24 hours  methocarbamol 500 milliGRAM(s) Oral every 8 hours  metoprolol tartrate 25 milliGRAM(s) Oral two times a day  mirtazapine 7.5 milliGRAM(s) Oral at bedtime  Nephro-elicia 1 Tablet(s) Oral daily  pantoprazole  Injectable 40 milliGRAM(s) IV Push daily  sodium chloride 0.65% Nasal 1 Spray(s) Both Nostrils every 12 hours  sodium chloride 0.9% for Nebulization 3 milliLiter(s) Nebulizer every 6 hours  sodium chloride 0.9% lock flush 10 milliLiter(s) IV Push every 1 hour PRN  sodium chloride 0.9%. 1000 milliLiter(s) IV Continuous <Continuous>  sodium zirconium cyclosilicate 10 Gram(s) Oral <User Schedule>  traZODone 100 milliGRAM(s) Oral at bedtime                   8.0    7.77  )-----------( 240      ( 05 Mar 2025 00:29 )             25.7     03-05    137  |  96  |  28[H]  ----------------------------<  146[H]  3.9   |  27  |  4.92[H]    Ca    9.0      05 Mar 2025 00:29  Phos  4.4     03-05  Mg     2.4     03-05    TPro  6.4  /  Alb  2.9[L]  /  TBili  0.4  /  DBili  x   /  AST  18  /  ALT  13  /  AlkPhos  87  03-05    PTT - ( 05 Mar 2025 00:29 )  PTT:63.2 sec    PHYSICAL EXAM  General: NAD   HEENT:  NC/AT  Neurology: A&Ox3, NAD  CV : RRR+S1S2  Chest: MSI CDI   Lungs: Respirations non-labored, B/L BS clear  Abdomen: Soft, NT/ND  : HD  Extremities: negative B/L LE edema, negative calf tenderness, +PP B/L       Disposition:  TBD[ x ]     Home[  ]     Rehab[  ]      OR[  ] Date:    Plan of care discussed with Cardiothoracic Team at AM rounds.            Subjective: Patient seen and examine at bedside, Denies any current SOB, chest pains, headaches, abdominal pain    Telemetry: Aflutter 60's  Overnight Events: no acute events    Vital Signs Last 24 Hrs  T(C): 36.8 (25 @ 17:00), Max: 37.1 (25 @ 20:00)  T(F): 98.2 (25 @ 17:00), Max: 98.8 (25 @ 20:00)  HR: 70 (25 @ 18:18) (60 - 112)  BP: 123/56 (25 @ 18:00) (100/48 - 144/65)  RR: 16 (25 @ 18:00) (13 - 27)  SpO2: 100% (25 @ 18:18) (79% - 100%)             07:01  -   @ 07:00  --------------------------------------------------------  IN: 1142.5 mL / OUT: 2000 mL / NET: -857.5 mL     07:01  -   @ 18:59  --------------------------------------------------------  IN: 325 mL / OUT: 0 mL / NET: 325 mL     Daily Weight in k.2 (06 Mar 2025 00:00)  Admit Wt: Drug Dosing Weight  Height (cm): 180.3 (30 Dec 2024 12:01)  Weight (kg): 85.1 (30 Dec 2024 12:01)  BMI (kg/m2): 26.2 (30 Dec 2024 12:01)  BSA (m2): 2.05 (30 Dec 2024 12:01)      CAPILLARY BLOOD GLUCOSE  138 (06 Mar 2025 06:00)  114 (06 Mar 2025 00:00)    POCT Blood Glucose.: 107 mg/dL (06 Mar 2025 17:17)  POCT Blood Glucose.: 106 mg/dL (06 Mar 2025 13:34)  POCT Blood Glucose.: 138 mg/dL (06 Mar 2025 06:26)  POCT Blood Glucose.: 114 mg/dL (06 Mar 2025 00:36)          acetaminophen     Tablet .. 650 milliGRAM(s) Oral every 6 hours PRN  apixaban 2.5 milliGRAM(s) Oral two times a day  ascorbic acid 500 milliGRAM(s) Oral daily  aspirin  chewable 81 milliGRAM(s) Oral daily  atorvastatin 80 milliGRAM(s) Oral at bedtime  buDESOnide    Inhalation Suspension 0.5 milliGRAM(s) Inhalation two times a day  chlorhexidine 2% Cloths 1 Application(s) Topical daily  chlorhexidine 4% Liquid 1 Application(s) Topical <User Schedule>  dextrose 50% Injectable 50 milliLiter(s) IV Push every 15 minutes  epoetin ignacia (EPOGEN) Injectable 14719 Unit(s) IV Push <User Schedule>  ferrous    sulfate Liquid 300 milliGRAM(s) Enteral Tube daily  insulin lispro (ADMELOG) corrective regimen sliding scale   SubCutaneous every 6 hours  lidocaine/prilocaine Cream 1 Application(s) Topical daily PRN  melatonin 5 milliGRAM(s) Oral at bedtime  meropenem  IVPB 500 milliGRAM(s) IV Intermittent every 24 hours  methocarbamol 500 milliGRAM(s) Oral every 8 hours  metoprolol tartrate 25 milliGRAM(s) Oral two times a day  mirtazapine 7.5 milliGRAM(s) Oral at bedtime  Nephro-elicia 1 Tablet(s) Oral daily  pantoprazole  Injectable 40 milliGRAM(s) IV Push daily  sodium chloride 0.65% Nasal 1 Spray(s) Both Nostrils every 12 hours  sodium chloride 0.9% for Nebulization 3 milliLiter(s) Nebulizer every 6 hours  sodium chloride 0.9% lock flush 10 milliLiter(s) IV Push every 1 hour PRN  sodium chloride 0.9%. 1000 milliLiter(s) IV Continuous <Continuous>  sodium zirconium cyclosilicate 10 Gram(s) Oral <User Schedule>  traZODone 100 milliGRAM(s) Oral at bedtime                   8.0    7.77  )-----------( 240      ( 05 Mar 2025 00:29 )             25.7     03-05    137  |  96  |  28[H]  ----------------------------<  146[H]  3.9   |  27  |  4.92[H]    Ca    9.0      05 Mar 2025 00:29  Phos  4.4     03-05  Mg     2.4     03-05    TPro  6.4  /  Alb  2.9[L]  /  TBili  0.4  /  DBili  x   /  AST  18  /  ALT  13  /  AlkPhos  87  03-05    PTT - ( 05 Mar 2025 00:29 )  PTT:63.2 sec    PHYSICAL EXAM  General: NAD   HEENT:  NC/AT  + Trach Collar  Neurology: A&Ox3, NAD  CV : IRR+S1S2  Chest: MSI CDI   Lungs: Respirations non-labored, B/L BS clear  Abdomen: Soft, NT/ND  : HD  Extremities: + sacral wound vac, negative B/L LE edema, negative calf tenderness, +PP B/L, + RUE midline       Disposition:  TBD[ x ]     Home[  ]     Rehab[  ]      OR[  ] Date:    Plan of care discussed with Cardiothoracic Team at AM rounds.

## 2025-03-07 DIAGNOSIS — R13.10 DYSPHAGIA, UNSPECIFIED: ICD-10-CM

## 2025-03-07 DIAGNOSIS — A49.8 OTHER BACTERIAL INFECTIONS OF UNSPECIFIED SITE: ICD-10-CM

## 2025-03-07 LAB
ALBUMIN SERPL ELPH-MCNC: 3 G/DL — LOW (ref 3.3–5)
ALP SERPL-CCNC: 93 U/L — SIGNIFICANT CHANGE UP (ref 40–120)
ALT FLD-CCNC: 10 U/L — SIGNIFICANT CHANGE UP (ref 10–45)
BASOPHILS # BLD AUTO: 0.04 K/UL — SIGNIFICANT CHANGE UP (ref 0–0.2)
BILIRUB SERPL-MCNC: 0.4 MG/DL — SIGNIFICANT CHANGE UP (ref 0.2–1.2)
BUN SERPL-MCNC: 29 MG/DL — HIGH (ref 7–23)
CHLORIDE SERPL-SCNC: 93 MMOL/L — LOW (ref 96–108)
CO2 SERPL-SCNC: 27 MMOL/L — SIGNIFICANT CHANGE UP (ref 22–31)
CREAT SERPL-MCNC: 5.31 MG/DL — HIGH (ref 0.5–1.3)
EGFR: 12 ML/MIN/1.73M2 — LOW
EOSINOPHIL # BLD AUTO: 0.54 K/UL — HIGH (ref 0–0.5)
EOSINOPHIL NFR BLD AUTO: 5.4 % — SIGNIFICANT CHANGE UP (ref 0–6)
GLUCOSE SERPL-MCNC: 108 MG/DL — HIGH (ref 70–99)
HCT VFR BLD CALC: 27.5 % — LOW (ref 39–50)
HGB BLD-MCNC: 8.7 G/DL — LOW (ref 13–17)
IMM GRANULOCYTES NFR BLD AUTO: 1.3 % — HIGH (ref 0–0.9)
LYMPHOCYTES # BLD AUTO: 0.88 K/UL — LOW (ref 1–3.3)
LYMPHOCYTES # BLD AUTO: 8.7 % — LOW (ref 13–44)
MAGNESIUM SERPL-MCNC: 2.5 MG/DL — SIGNIFICANT CHANGE UP (ref 1.6–2.6)
MCHC RBC-ENTMCNC: 31.3 PG — SIGNIFICANT CHANGE UP (ref 27–34)
MCHC RBC-ENTMCNC: 31.6 G/DL — LOW (ref 32–36)
MCV RBC AUTO: 98.9 FL — SIGNIFICANT CHANGE UP (ref 80–100)
MONOCYTES # BLD AUTO: 0.97 K/UL — HIGH (ref 0–0.9)
MONOCYTES NFR BLD AUTO: 9.6 % — SIGNIFICANT CHANGE UP (ref 2–14)
NRBC BLD AUTO-RTO: 0 /100 WBCS — SIGNIFICANT CHANGE UP (ref 0–0)
PLATELET # BLD AUTO: 232 K/UL — SIGNIFICANT CHANGE UP (ref 150–400)
POTASSIUM SERPL-MCNC: 4.1 MMOL/L — SIGNIFICANT CHANGE UP (ref 3.5–5.3)
POTASSIUM SERPL-SCNC: 4.1 MMOL/L — SIGNIFICANT CHANGE UP (ref 3.5–5.3)
PROT SERPL-MCNC: 6.6 G/DL — SIGNIFICANT CHANGE UP (ref 6–8.3)
RBC # BLD: 2.78 M/UL — LOW (ref 4.2–5.8)
RBC # FLD: 19.9 % — HIGH (ref 10.3–14.5)
SODIUM SERPL-SCNC: 135 MMOL/L — SIGNIFICANT CHANGE UP (ref 135–145)
WBC # BLD: 10.06 K/UL — SIGNIFICANT CHANGE UP (ref 3.8–10.5)
WBC # FLD AUTO: 10.06 K/UL — SIGNIFICANT CHANGE UP (ref 3.8–10.5)

## 2025-03-07 PROCEDURE — 71045 X-RAY EXAM CHEST 1 VIEW: CPT | Mod: 26

## 2025-03-07 RX ORDER — LIDOCAINE AND PRILOCAINE 25; 25 MG/G; MG/G
1 CREAM TOPICAL ONCE
Refills: 0 | Status: COMPLETED | OUTPATIENT
Start: 2025-03-07 | End: 2025-03-07

## 2025-03-07 RX ORDER — OXYCODONE HYDROCHLORIDE 30 MG/1
10 TABLET ORAL ONCE
Refills: 0 | Status: DISCONTINUED | OUTPATIENT
Start: 2025-03-07 | End: 2025-03-07

## 2025-03-07 RX ADMIN — Medication 40 MILLIGRAM(S): at 11:38

## 2025-03-07 RX ADMIN — APIXABAN 2.5 MILLIGRAM(S): 2.5 TABLET, FILM COATED ORAL at 09:36

## 2025-03-07 RX ADMIN — Medication 1 SPRAY(S): at 17:22

## 2025-03-07 RX ADMIN — Medication 3 MILLILITER(S): at 13:14

## 2025-03-07 RX ADMIN — Medication 3 MILLILITER(S): at 17:23

## 2025-03-07 RX ADMIN — OXYCODONE HYDROCHLORIDE 10 MILLIGRAM(S): 30 TABLET ORAL at 22:04

## 2025-03-07 RX ADMIN — Medication 650 MILLIGRAM(S): at 09:36

## 2025-03-07 RX ADMIN — METHOCARBAMOL 500 MILLIGRAM(S): 500 TABLET, FILM COATED ORAL at 09:37

## 2025-03-07 RX ADMIN — METOPROLOL SUCCINATE 25 MILLIGRAM(S): 50 TABLET, EXTENDED RELEASE ORAL at 17:22

## 2025-03-07 RX ADMIN — METOPROLOL SUCCINATE 25 MILLIGRAM(S): 50 TABLET, EXTENDED RELEASE ORAL at 09:36

## 2025-03-07 RX ADMIN — LIDOCAINE AND PRILOCAINE 1 APPLICATION(S): 25; 25 CREAM TOPICAL at 11:28

## 2025-03-07 RX ADMIN — BUDESONIDE 0.5 MILLIGRAM(S): 0.25 SUSPENSION RESPIRATORY (INHALATION) at 17:22

## 2025-03-07 RX ADMIN — Medication 300 MILLIGRAM(S): at 12:35

## 2025-03-07 RX ADMIN — Medication 1 TABLET(S): at 11:37

## 2025-03-07 RX ADMIN — EPOETIN ALFA 10000 UNIT(S): 10000 SOLUTION INTRAVENOUS; SUBCUTANEOUS at 14:06

## 2025-03-07 RX ADMIN — METHOCARBAMOL 500 MILLIGRAM(S): 500 TABLET, FILM COATED ORAL at 13:14

## 2025-03-07 RX ADMIN — APIXABAN 2.5 MILLIGRAM(S): 2.5 TABLET, FILM COATED ORAL at 17:22

## 2025-03-07 RX ADMIN — Medication 81 MILLIGRAM(S): at 11:37

## 2025-03-07 RX ADMIN — BUDESONIDE 0.5 MILLIGRAM(S): 0.25 SUSPENSION RESPIRATORY (INHALATION) at 09:36

## 2025-03-07 RX ADMIN — Medication 3 MILLILITER(S): at 09:36

## 2025-03-07 RX ADMIN — Medication 500 MILLIGRAM(S): at 11:37

## 2025-03-07 RX ADMIN — Medication 650 MILLIGRAM(S): at 10:06

## 2025-03-07 RX ADMIN — Medication 1 APPLICATION(S): at 06:55

## 2025-03-07 RX ADMIN — OXYCODONE HYDROCHLORIDE 10 MILLIGRAM(S): 30 TABLET ORAL at 22:34

## 2025-03-07 NOTE — PROGRESS NOTE ADULT - SUBJECTIVE AND OBJECTIVE BOX
[facility]    Dr. Nica Styles     Office [phone] (9 am to 5 pm)  Service: [phone] (5pm to 9am)  Also Available on TEAMS      RENAL PROGRESS NOTE: DATE OF SERVICE 03-07-25 @ 10:54    Patient is a 54y old  Male who presents with a chief complaint of CAD s/p CABG (07 Mar 2025 06:37)      Patient seen and examined at bedside. No chest pain/sob    VITALS:  T(F): 98 (03-06-25 @ 23:21), Max: 98.2 (03-06-25 @ 17:00)  HR: 59 (03-07-25 @ 03:02)  BP: 136/53 (03-06-25 @ 23:21)  RR: 18 (03-07-25 @ 03:02)  SpO2: 100% (03-07-25 @ 03:02)  Wt(kg): --    03-06 @ 07:01  -  03-07 @ 07:00  --------------------------------------------------------  IN: 785 mL / OUT: 1 mL / NET: 784 mL          PHYSICAL EXAM:  Constitutional: NAD  Neck: No JVD  Respiratory: CTAB, no wheezes, rales or rhonchi  Cardiovascular: S1, S2, RRR  Gastrointestinal: BS+, soft, NT/ND  Extremities: No peripheral edema    Hospital Medications:   MEDICATIONS  (STANDING):  apixaban 2.5 milliGRAM(s) Oral two times a day  ascorbic acid 500 milliGRAM(s) Oral daily  aspirin  chewable 81 milliGRAM(s) Oral daily  atorvastatin 80 milliGRAM(s) Oral at bedtime  buDESOnide    Inhalation Suspension 0.5 milliGRAM(s) Inhalation two times a day  chlorhexidine 2% Cloths 1 Application(s) Topical daily  chlorhexidine 4% Liquid 1 Application(s) Topical <User Schedule>  dextrose 50% Injectable 50 milliLiter(s) IV Push every 15 minutes  epoetin ignacia (EPOGEN) Injectable 04663 Unit(s) IV Push <User Schedule>  ferrous    sulfate Liquid 300 milliGRAM(s) Enteral Tube daily  insulin lispro (ADMELOG) corrective regimen sliding scale   SubCutaneous every 6 hours  lidocaine/prilocaine Cream 1 Application(s) Topical once  melatonin 5 milliGRAM(s) Oral at bedtime  methocarbamol 500 milliGRAM(s) Oral every 8 hours  metoprolol tartrate 25 milliGRAM(s) Oral two times a day  mirtazapine 7.5 milliGRAM(s) Oral at bedtime  Nephro-elicia 1 Tablet(s) Oral daily  pantoprazole  Injectable 40 milliGRAM(s) IV Push daily  sodium chloride 0.65% Nasal 1 Spray(s) Both Nostrils every 12 hours  sodium chloride 0.9% for Nebulization 3 milliLiter(s) Nebulizer every 6 hours  sodium chloride 0.9%. 1000 milliLiter(s) (10 mL/Hr) IV Continuous <Continuous>  sodium zirconium cyclosilicate 10 Gram(s) Oral <User Schedule>  traZODone 100 milliGRAM(s) Oral at bedtime      LABS:        Creatinine Trend: 4.92 <--, 3.99 <--, 5.47 <--, 4.89 <--, 4.32 <--, 3.15 <--            Urine Studies:  Urinalysis - [03-05-25 @ 00:29]      Color  / Appearance  / SG  / pH       Gluc 146 / Ketone   / Bili  / Urobili        Blood  / Protein  / Leuk Est  / Nitrite       RBC  / WBC  / Hyaline  / Gran  / Sq Epi  / Non Sq Epi  / Bacteria       Iron 29, TIBC 143, %sat 20      [12-19-24 @ 05:00]  Ferritin 904      [12-19-24 @ 05:00]  TSH 4.75      [12-24-24 @ 06:50]        RADIOLOGY & ADDITIONAL STUDIES:

## 2025-03-07 NOTE — PROGRESS NOTE ADULT - PROBLEM SELECTOR PLAN 2
Renal following   HD, next session 3/7 Renal following   HD Monday / Wednesday / Friday  Continue Epogen 10,000 units IV TIW on Monday / Wednesday / Friday with HD   Continue Ferrous Sulfate 300 via NGT daily   Continue Nephro-Lisette 1 tab PO vis NGT daily

## 2025-03-07 NOTE — PROGRESS NOTE ADULT - ASSESSMENT
53 yo M with multiple medical problems including CAD s/p PCI in 2024 s/p CABG x 3 on 24    1/: Intubated for hypoxia & left lung white out s/p awake bronch with removal of copious mucopurulent secretions  : s/p IABP insertion, sepsis/septic shock due to E coli   ESBL pneumonia, collapsed Left Lung, s/p multiple bronch    tracheostomy tube placement    VRE E. Faecium Bcx   +HSV PCR BAL   tissue cx from back abscess - Serratia/MRSA  - - intermittent bradycardia/junctional episodes with hypotension  2/3: off , off theophylline. iHD 1L UF  : bronch for Left lung collapse wound vac, 2decho w/ moderate pericardial effusion - similar as before, US abd: small - moderate ascites - monitor at this time.  Wound vac placed for sacral wounds  : iHD-1L UF  : bronch today off positive pressure   : concern for left food drop   2/10 : no acute issues - CXR shows improving left sided aeration, pt subjectively reports having difficulty while lying flat  : s/p Paracentesis 3.5 L removed, leukocytosis   : Ascites fluid PMN < 250 (less likely SBP)   sniff test yesterday showed paradoxical diaphragmatic motion    : mucus plugging but able to clear airway with aggressive pulmonary toileting.   : no vent requirement overnight, able to mobilize secretion with pulm toileting  hyperkalemia post HD - workup for possible recirculation underway   : On TC X 48 hours - ambulating well, no mucus plugging events  : HFTC x24hrs (40L 30%)  : iHD 1.5L UF  : iHD 2L  : trach downsized to 7, bronch  - cultures growing ESBL E Coli, sacral wound vac changed   3/ Tx to SDU, +PW isolated, +sacral wound vac in place, + Tube feeds running, next HD scheduled for tomorrow      55 yo M with multiple medical problems including CAD s/p PCI in 2024 s/p CABG x 3 on 24    1: Intubated for hypoxia & left lung white out s/p awake bronch with removal of copious mucopurulent secretions  : s/p IABP insertion, sepsis/septic shock due to E coli   ESBL pneumonia, collapsed Left Lung, s/p multiple bronch    tracheostomy tube placement    VRE E. Faecium Bcx   +HSV PCR BAL   tissue cx from back abscess - Serratia/MRSA  - - intermittent bradycardia/junctional episodes with hypotension  2/3: off , off theophylline. iHD 1L UF  : bronch for Left lung collapse wound vac, 2decho w/ moderate pericardial effusion - similar as before, US abd: small - moderate ascites - monitor at this time.  Wound vac placed for sacral wounds  : iHD-1L UF  : bronch today off positive pressure   : concern for left food drop   2/10 : no acute issues - CXR shows improving left sided aeration, pt subjectively reports having difficulty while lying flat  : s/p Paracentesis 3.5 L removed, leukocytosis   : Ascites fluid PMN < 250 (less likely SBP)   sniff test yesterday showed paradoxical diaphragmatic motion    : mucus plugging but able to clear airway with aggressive pulmonary toileting.   : no vent requirement overnight, able to mobilize secretion with pulm toileting  hyperkalemia post HD - workup for possible recirculation underway   : On TC X 48 hours - ambulating well, no mucus plugging events  : HFTC x24hrs (40L 30%)  : iHD 1.5L UF  : iHD 2L  : trach downsized to 7, bronch  - cultures growing ESBL E Coli, sacral wound vac changed   3/6 Transfer to SDU, +PW isolated, +sacral wound vac in place, + Tube feeds running, next HD scheduled for tomorrow   3/7 VSS; Continue with current medication regimen.  Continue on TC 40% 02.  Plan for HD today.  Nocturnal feeding.  Disposition: Home PT / OT once medically cleared.

## 2025-03-07 NOTE — PROGRESS NOTE ADULT - PROBLEM SELECTOR PLAN 1
Tx to SDU  + PW isolated  Trach collar in place #7 cuffless  + Tube feeds continuous  consult ENT in AM to re-evaluate  Daily CXR in AM   only AM labs on HD days   Q4 Suction  Eliquis for Afib/flutter  + ESBL ECOLI ID following meropenem to complete today 3/6   Cough and deep breathe, Incentive Spirometry Q1h, Aggressive Chest PT, Pulm Toilet  C/W GI prophylaxis  DVT prophylaxis: on eliquis Continue with ASA 81 mg PO Daily.   Continue with Lopressor 25 mg PO BID   Continue with Lipitor 80 mg PO HS  Maintain + PW isolated  Trach collar in place #7 cuffless    Increase activity as tolerated.   Encourage Chest PT / Pulmonary toileting and Incentive spirometry every 1 hour x 10 while awake.   Continue with Protonix 40 mg IV daily for PUD prophylaxis.   Sternal precautions and wound care  Daily Shower   D/C plan home with PT/OT once medically cleared   Plan of care discussed with attending

## 2025-03-07 NOTE — PROGRESS NOTE ADULT - ASSESSMENT
54M with PMH of HTN, HLD, ESRD on HD MWF via LUE AVF, ascites (requiring repeat paracenteses q4-6wks), NICM with moderate LV dysfunction w/ EF 35-40%() and CAD s/p atherectomy + SALLIE to D1(2024) presents to [facility] transferred from [facility].  Cardiac cath which showed pLAD 70% ISR, mLCx 70%, D1 severe dz.   Patient now transferred to [facility] CTS Dr. Rivers for CABG eval      # CAD s/p NSTEMI  Hx CAD s/p PCI LAD   Presented with ADHF elevated troponins  Positive NST1-Cath- pLAD 70% ISR, mLCx 70%, D1 severe dz.   TTE EF 35% Severe TRWas on Plavix-[id number] 321 been off Plavix since -POD#1-C3L free LIMA-LAD; SVG-Diag; ITA-sbkmOO-Fqjldeduo on  Sinus rhythm,Amio for AF prophylaxis   Atrial Flutter on TC during day Ambulating  -Increased congestion on CXR had HD yesterday remains in AFl   -Atrial Flutter HFTC 40L/30% BP stable     -Atrial Flutter tolerating TC for HD MWF-consider DOAC   OOB Abd US moderate 4 quad ascites noted remains in Atrial Flutter~~70's   TC Atrial Flutter  -s/p Bronch/BAL remains in AFl  Trach downsized 7   -Tolerating TC AFl rate controlled On Heparin gtt transition to DOAC  -Tx to SDU on Eliquis Atrial Flutter        # Acute on Chronic Systolic Heart Failure now improved  EF-35% ,severe TR  Had HD post op  GDMT post op  LVEF improved post bypass   -TTE EF 40%,trace effusion  ECG c/w pericarditis-was on colchicine   01/15-TTE EF 55%  -TTE EF 60%   -Normal LV systolic function Moderate pericardial effusion  -On TC-HF and Vent support at nights   02/10-Vent HS with T Collar trial Ambulated Remains in AF  Repeat TTE Normal LV Systolic function Small Pericardial effusion decreased from 2/3        Vascular evaluated  AVGraft /?steal causing RV failure-'' Very low suspicion of steal Sd and AVF causing current clinical state. ''   VA Duplex Hemodialysis Access, Left (25 @ 13:35) >   Arterial flow volume of 1301 mL/m, and the venous flow volume of  1492 mL/m are consistent with a normally functioning dialysis access  fistula.      # Ascites  Hx chronic ascites  Has drainage q4-6 weeks  Last drained -4600mL  ? ascites related to severe TR  Had fbpmecxqiens-Rfeapmb-do growth   CT Abdomen 01/10-Large volume ascites-  US abdomen--Small to moderate volume abdominal/pelvic ascites  US abdomen  Moderate ascites  US Abdomen -Moderate 4 quad ascites    # ESRD  Now on  HD-MWF

## 2025-03-07 NOTE — PROGRESS NOTE ADULT - SUBJECTIVE AND OBJECTIVE BOX
MR#[id number]  PATIENT NAME:RAAD CANO    DATE OF SERVICE: 03-07-25 @ 06:37  Patient was seen and examined by Solo Quick MD on    03-07-25 @ 06:37 .  Interim events noted.Consultant notes ,Labs,Telemetry reviewed by me       HOSPITAL COURSE: HPI:  54M with PMH of HTN, HLD, ESRD on HD MWF via LUE AVF, ascites (requiring repeat paracenteses q4-6wks), NICM with moderate LV dysfunction w/ EF 35-40%(2023) and CAD s/p atherectomy + SALLIE to D1(4/11/2024) presents to [facility] transferred from [facility]. Patient initially presented to [facility] ED with shortness of breath. Of note he was recently admitted from 09/27-12/02 for acute cholecystitis s/p cholecystectomy. In [facility] ED, pt was hypoxic to 86% on RA, trop 1.7K, EKG no new changes, CXR fluid overload. Admitted for AHRF 2/2 fluid overload. Pt received HD on 12/18, 12/19, 12/20 and 12/22. DAPT was resumed, TTE showed improvement in LVEF to 40-45%. Stress test was abnormal with 1mm inferior STD, reversible ischemia in the inferior wall and fixed defects in the lateral, anterior and apical walls with post stress EF of 24%. Pt was then transferred to [facility] for cardiac cath which showed pLAD 70% ISR, mLCx 70%, D1 severe dz. Patient now transferred to [facility] CTS Dr. Rivers for CABG eval. Patient denies any current SOB or chest pain on admission. Otherwise denies headaches, abdominal pain, urinary or bowel changes, fevers, chills, N/V/D, sick contacts.  (24 Dec 2024 05:07)      INTERIM EVENTS:Patient seen at bedside ,interim events noted.  12/23 Cardiac cath  pLAD 70% ISR, mLCx 70%, D1 severe dz.   12/31-POD#1-C3L free LIMA-LAD; SVG-Diag; SVG-leftPL Awake OOB extubated Sinus rhythm on Dobutamine gtt  02/19 Had Bronch yesterday on vent HS remains in Atrial Flutter  02/21-Awake on HFTC at night Ambulating-Atrial Flutter  02/22-Had Midline Remains in Atrial Flutter on TC  02/23-Lying in bed still on NGT feeds on TC-HFTC-40L/30% Atrial Flutter  02/24-Awake Tolerating TC Atrial Flutter~~'s  For HD today  02/26-OOB on TC not dyspneac remains in AFl on Tube feeds  02/27-Had HD remains in AFl on TC  02/28 Awake had Bronch Trach downsized to 7 AFl  03/01-Awake Atrial Flutter no dyspnea TC tolerating  03/02-Awake vomitting subsided  03/04-Awake Had FEES still NPO Atrial Flutter   03/06-Tx to SDU  03/07-Awake Atrial Flutter     PMH -reviewed admission note, no change since admission        MEDICATIONS  (STANDING):  apixaban 2.5 milliGRAM(s) Oral two times a day  ascorbic acid 500 milliGRAM(s) Oral daily  aspirin  chewable 81 milliGRAM(s) Oral daily  atorvastatin 80 milliGRAM(s) Oral at bedtime  buDESOnide    Inhalation Suspension 0.5 milliGRAM(s) Inhalation two times a day  chlorhexidine 2% Cloths 1 Application(s) Topical daily  chlorhexidine 4% Liquid 1 Application(s) Topical <User Schedule>  dextrose 50% Injectable 50 milliLiter(s) IV Push every 15 minutes  epoetin ignacia (EPOGEN) Injectable 08699 Unit(s) IV Push <User Schedule>  ferrous    sulfate Liquid 300 milliGRAM(s) Enteral Tube daily  insulin lispro (ADMELOG) corrective regimen sliding scale   SubCutaneous every 6 hours  melatonin 5 milliGRAM(s) Oral at bedtime  methocarbamol 500 milliGRAM(s) Oral every 8 hours  metoprolol tartrate 25 milliGRAM(s) Oral two times a day  mirtazapine 7.5 milliGRAM(s) Oral at bedtime  Nephro-elicia 1 Tablet(s) Oral daily  pantoprazole  Injectable 40 milliGRAM(s) IV Push daily  sodium chloride 0.65% Nasal 1 Spray(s) Both Nostrils every 12 hours  sodium chloride 0.9% for Nebulization 3 milliLiter(s) Nebulizer every 6 hours  sodium chloride 0.9%. 1000 milliLiter(s) (10 mL/Hr) IV Continuous <Continuous>  sodium zirconium cyclosilicate 10 Gram(s) Oral <User Schedule>  traZODone 100 milliGRAM(s) Oral at bedtime    MEDICATIONS  (PRN):  acetaminophen     Tablet .. 650 milliGRAM(s) Oral every 6 hours PRN Mild Pain (1 - 3)  lidocaine/prilocaine Cream 1 Application(s) Topical daily PRN pre-HD  sodium chloride 0.9% lock flush 10 milliLiter(s) IV Push every 1 hour PRN Pre/post blood products, medications, blood draw, and to maintain line patency            REVIEW OF SYSTEMS:  Constitutional: [ ] fever, [ ]weight loss,  [x ]fatigue [ ]weight gain  Eyes: [ ] visual changes  Respiratory: [ ]shortness of breath;  [ ] cough, [ ]wheezing, [ ]chills, [ ]hemoptysis  Cardiovascular: [ ] chest pain, [ ]palpitations, [ ]dizziness,  [ ]leg swelling[ ]orthopnea[ ]PND  Gastrointestinal: [ ] abdominal pain, [ ]nausea, [ ]vomiting,  [ ]diarrhea [ ]Constipation [ ]Melena  Genitourinary: [ ] dysuria, [ ] hematuria [ ]Vigil  Neurologic: [ ] headaches [ ] tremors[ ]weakness [ ]Paralysis Right[ ] Left[ ]  Skin: [ ] itching, [ ]burning, [ ] rashes  Endocrine: [ ] heat or cold intolerance  Musculoskeletal: [ ] joint pain or swelling; [ ] muscle, back, or extremity pain  Psychiatric: [ ] depression, [ ]anxiety, [ ]mood swings, or [ ]difficulty sleeping  Hematologic: [ ] easy bruising, [ ] bleeding gums    [ ] All remaining systems negative except as per above.   [ ]Unable to obtain.  [x] No change in ROS since admission      Vital Signs Last 24 Hrs  T(C): 36.7 (06 Mar 2025 23:21), Max: 36.8 (06 Mar 2025 17:00)  T(F): 98 (06 Mar 2025 23:21), Max: 98.2 (06 Mar 2025 17:00)  HR: 59 (07 Mar 2025 03:02) (59 - 110)  BP: 136/53 (06 Mar 2025 23:21) (104/46 - 137/52)  BP(mean): 77 (06 Mar 2025 23:21) (66 - 88)  RR: 18 (07 Mar 2025 03:02) (13 - 27)  SpO2: 100% (07 Mar 2025 03:02) (87% - 100%)    Parameters below as of 07 Mar 2025 03:02  Patient On (Oxygen Delivery Method): tracheostomy collar    O2 Concentration (%): 40  I&O's Summary    05 Mar 2025 07:01  -  06 Mar 2025 07:00  --------------------------------------------------------  IN: 1142.5 mL / OUT: 2000 mL / NET: -857.5 mL    06 Mar 2025 07:01  -  07 Mar 2025 06:37  --------------------------------------------------------  IN: 785 mL / OUT: 0 mL / NET: 785 mL        PHYSICAL EXAM:  General: No acute distress BMI-28  HEENT: EOMI, PERRL  Neck: Supple, [ ] JVD  Lungs: Equal air entry bilaterally; [ ] rales [ ] wheezing [ ] rhonchi  Heart: Irregular rate and rhythm; [x ] murmur   2/6 [ x] systolic [ ] diastolic [ ] radiation[ ] rubs [ ]  gallops  Abdomen: Nontender, bowel sounds present  Extremities: No clubbing, cyanosis, [ ] edema [ ]Pulses  equal and intact  Nervous system:  Alert & Oriented X3, no focal deficits  Psychiatric: Normal affect  Skin: No rashes or lesions    LABS:        Creatinine Trend: 4.92<--, 3.99<--, 5.47<--, 4.89<--, 4.32<--, 3.15<--          Xray Chest 1 View- PORTABLE-Urgent (Xray Chest 1 View- PORTABLE-Urgent .) (03.06.25 @ 15:53) >  COMPARISON: Chest x-ray dated 3/5/2025.    FINDINGS:  Heart/Mediastinum: Cardiomediastinal silhouette is normal, unchanged    Lungs/Pleura: Trace left pleural effusion adjacent, passive basilar  atelectasis. No focal consolidation. No pneumothorax. Normal pulmonary  vasculature    Osseous Structures: No acutefindings.    Lines/Tubes: Enteric tube coursing below left hemidiaphragm.    IMPRESSION:  Trace left pleural effusion/adjacent atelectasis, slightly decreased from  prior x-ray.       TTE Limited W or WO Ultrasound Enhancing Agent (02.18.25 @ 13:54) >  CONCLUSIONS:      1. Left ventricular systolic function is normal.   2. Enlarged right ventricular cavity size and mildly reduced right ventricular systolic function.   3. Left pleural effusion noted.   4. Trace pericardial effusion.

## 2025-03-07 NOTE — PROGRESS NOTE ADULT - SUBJECTIVE AND OBJECTIVE BOX
VITAL SIGNS    Subjective: "I'm ok" Denies CP, palpitation, SOB, WEBSTER, HA, dizziness, N/V/D, fever or chills.  No acute event noted overnight.     Telemetry: Aflutter 60-90      Vital Signs Last 24 Hrs  T(C): 36.7 (03-06-25 @ 23:21), Max: 36.8 (03-06-25 @ 17:00)  T(F): 98 (03-06-25 @ 23:21), Max: 98.2 (03-06-25 @ 17:00)  HR: 60 (03-07-25 @ 12:14) (59 - 110)  BP: 115/44 (03-07-25 @ 12:14) (105/46 - 137/52)  RR: 18 (03-07-25 @ 12:14) (14 - 25)  SpO2: 100% (03-07-25 @ 12:14) (87% - 100%)           03-06 @ 07:01  -  03-07 @ 07:00  --------------------------------------------------------  IN: 785 mL / OUT: 1 mL / NET: 784 mL    CAPILLARY BLOOD GLUCOSE    POCT Blood Glucose.: 120 mg/dL (07 Mar 2025 12:29)  POCT Blood Glucose.: 115 mg/dL (07 Mar 2025 06:35)  POCT Blood Glucose.: 119 mg/dL (06 Mar 2025 23:50)  POCT Blood Glucose.: 107 mg/dL (06 Mar 2025 17:17)  POCT Blood Glucose.: 106 mg/dL (06 Mar 2025 13:34)        PHYSICAL EXAM    Neurology: alert and oriented x 3, nonfocal, no gross deficits    HEENT: # 7 cuffless trach --> 40% TC     CV: (+) S1 and S2, No murmurs, rubs, gallops or clicks     Sternal Wound: MSI healed -->CDI, sternum stable; PW --> Isolated     Lungs: B/L scattered rhonchi throughout      Abdomen: soft, nontender, nondistended, positive bowel sounds, (+) Flatus; (+) BM.  NGT --> Clamped      : Bladder non distended                Extremities:  B/L LE (+) 1 edema; negative calf tenderness; (+) 2 DP palpable: LUE AV Fistula (+) Bruit / (+) thrill. Sacral decub with wound VAC --> Negative suction.        acetaminophen Tablet .. 650 milliGRAM(s) Oral every 6 hours PRN  apixaban 2.5 milliGRAM(s) Oral two times a day  ascorbic acid 500 milliGRAM(s) Oral daily  aspirin  chewable 81 milliGRAM(s) Oral daily  atorvastatin 80 milliGRAM(s) Oral at bedtime  buDESOnide    Inhalation Suspension 0.5 milliGRAM(s) Inhalation two times a day  chlorhexidine 2% Cloths 1 Application(s) Topical daily  chlorhexidine 4% Liquid 1 Application(s) Topical <User Schedule>  dextrose 50% Injectable 50 milliLiter(s) IV Push every 15 minutes  epoetin ignacia (EPOGEN) Injectable 89733 Unit(s) IV Push <User Schedule>  ferrous    sulfate Liquid 300 milliGRAM(s) Enteral Tube daily  insulin lispro (ADMELOG) corrective regimen sliding scale   SubCutaneous every 6 hours  lidocaine/prilocaine Cream 1 Application(s) Topical daily PRN  melatonin 5 milliGRAM(s) Oral at bedtime  methocarbamol 500 milliGRAM(s) Oral every 8 hours  metoprolol tartrate 25 milliGRAM(s) Oral two times a day  mirtazapine 7.5 milliGRAM(s) Oral at bedtime  Nephro-elicia 1 Tablet(s) Oral daily  pantoprazole  Injectable 40 milliGRAM(s) IV Push daily  sodium chloride 0.65% Nasal 1 Spray(s) Both Nostrils every 12 hours  sodium chloride 0.9% for Nebulization 3 milliLiter(s) Nebulizer every 6 hours  sodium chloride 0.9% lock flush 10 milliLiter(s) IV Push every 1 hour PRN  sodium chloride 0.9%. 1000 milliLiter(s) IV Continuous <Continuous>  sodium zirconium cyclosilicate 10 Gram(s) Oral <User Schedule>  traZODone 100 milliGRAM(s) Oral at bedtime    Physical Therapy Rec:   Home  [  ]   Home w/ PT  [  ]  Rehab  [ X ]    Discussed with Cardiothoracic Team at AM rounds.     VITAL SIGNS    Subjective: "I'm ok" Denies CP, palpitation, SOB, WEBSTER, HA, dizziness, N/V/D, fever or chills.  No acute event noted overnight.     Telemetry: Aflutter 60-90      Vital Signs Last 24 Hrs  T(C): 36.7 (03-06-25 @ 23:21), Max: 36.8 (03-06-25 @ 17:00)  T(F): 98 (03-06-25 @ 23:21), Max: 98.2 (03-06-25 @ 17:00)  HR: 60 (03-07-25 @ 12:14) (59 - 110)  BP: 115/44 (03-07-25 @ 12:14) (105/46 - 137/52)  RR: 18 (03-07-25 @ 12:14) (14 - 25)  SpO2: 100% (03-07-25 @ 12:14) (87% - 100%)           03-06 @ 07:01  -  03-07 @ 07:00  --------------------------------------------------------  IN: 785 mL / OUT: 1 mL / NET: 784 mL    CAPILLARY BLOOD GLUCOSE    POCT Blood Glucose.: 120 mg/dL (07 Mar 2025 12:29)  POCT Blood Glucose.: 115 mg/dL (07 Mar 2025 06:35)  POCT Blood Glucose.: 119 mg/dL (06 Mar 2025 23:50)  POCT Blood Glucose.: 107 mg/dL (06 Mar 2025 17:17)  POCT Blood Glucose.: 106 mg/dL (06 Mar 2025 13:34)        PHYSICAL EXAM    Neurology: alert and oriented x 3, nonfocal, no gross deficits    HEENT: # 7 cuffless trach --> 40% TC     CV: (+) S1 and S2, No murmurs, rubs, gallops or clicks     Sternal Wound: MSI healed -->CDI, sternum stable; PW --> Isolated     Lungs: B/L scattered rhonchi throughout      Abdomen: soft, nontender, nondistended, positive bowel sounds, (+) Flatus; (+) BM.  NGT --> Clamped      : Bladder non distended                Extremities:  B/L LE (+) 1 edema; negative calf tenderness; (+) 2 DP palpable: LUE AV Fistula (+) Bruit / (+) thrill. Sacral decub with wound VAC --> Negative suction. RUE with midline with occlusive dressing C/D/I. LUE AV fistula (+) bruit / (+) Thrill.         acetaminophen Tablet .. 650 milliGRAM(s) Oral every 6 hours PRN  apixaban 2.5 milliGRAM(s) Oral two times a day  ascorbic acid 500 milliGRAM(s) Oral daily  aspirin  chewable 81 milliGRAM(s) Oral daily  atorvastatin 80 milliGRAM(s) Oral at bedtime  buDESOnide    Inhalation Suspension 0.5 milliGRAM(s) Inhalation two times a day  chlorhexidine 2% Cloths 1 Application(s) Topical daily  chlorhexidine 4% Liquid 1 Application(s) Topical <User Schedule>  dextrose 50% Injectable 50 milliLiter(s) IV Push every 15 minutes  epoetin ignacia (EPOGEN) Injectable 40611 Unit(s) IV Push <User Schedule>  ferrous    sulfate Liquid 300 milliGRAM(s) Enteral Tube daily  insulin lispro (ADMELOG) corrective regimen sliding scale   SubCutaneous every 6 hours  lidocaine/prilocaine Cream 1 Application(s) Topical daily PRN  melatonin 5 milliGRAM(s) Oral at bedtime  methocarbamol 500 milliGRAM(s) Oral every 8 hours  metoprolol tartrate 25 milliGRAM(s) Oral two times a day  mirtazapine 7.5 milliGRAM(s) Oral at bedtime  Nephro-elicia 1 Tablet(s) Oral daily  pantoprazole  Injectable 40 milliGRAM(s) IV Push daily  sodium chloride 0.65% Nasal 1 Spray(s) Both Nostrils every 12 hours  sodium chloride 0.9% for Nebulization 3 milliLiter(s) Nebulizer every 6 hours  sodium chloride 0.9% lock flush 10 milliLiter(s) IV Push every 1 hour PRN  sodium chloride 0.9%. 1000 milliLiter(s) IV Continuous <Continuous>  sodium zirconium cyclosilicate 10 Gram(s) Oral <User Schedule>  traZODone 100 milliGRAM(s) Oral at bedtime    Physical Therapy Rec:   Home  [  ]   Home w/ PT  [  ]  Rehab  [ X ]    Discussed with Cardiothoracic Team at AM rounds.

## 2025-03-07 NOTE — PROGRESS NOTE ADULT - ASSESSMENT
54M with PMH of HTN, HLD, ESRD on HD MWF via LUE AVF, ascites (requiring repeat paracenteses q4-6wks), NICM with moderate LV dysfunction w/ EF 35-40%(2023) and CAD s/p atherectomy + SALLIE to D1(4/11/2024) presents to [facility] transferred from [facility] for CABG eval  Nephro on board for HD needs.    ESRD on HD   Nephrologist Dr. Bailey  [facility] Schedule / Access:  LUE AVF  Center: [facility]  Pt s/p HD on 2/19 and 2/21.  s/p HD 2/24 uf 1.5L  S/p HD on 02/26 2 L UF and 02/28 2 L UF again   S/p HD on 03/03 and 03/5  HD today per cristin   Keep MAP>65  Renal Diet  HD consent in chart     Hyper/Hypokalemia  Pt intermittently Hyperkalemia  HD as scheduled  now improved  Low K diet.  C/W Lokelma 10gm on nonHD days.  Monitor K.    HTN  BP fluctuating; controlled.  UF w/ HD as BP permits.  Low sodium diet.  Management per ICU  Monitor BP     CKD MBD  Check PTH   Low PO4 diet.  Not on binder.  Monitor Ca , PO4 daily     Anemia  CRISTIANE with HD  Repeat iron studies.  Transfuse if hgb <7    CAD with multivessel disease  CTICU following  s/p CABG 12/30    Hypoxic respiratory failure   S/p tracheostomy 01/18 now on trach collar  Per surgery  S/p bronchoscopy, pulm following

## 2025-03-07 NOTE — PROGRESS NOTE ADULT - PROBLEM SELECTOR PLAN 6
NPO on Nocturnal enteral feeding via Nasogastric Tube Amanda Hooker 1.5 @ 70 cc / hr x 12 hours   ENT consult recalled to re-evaluate

## 2025-03-08 PROCEDURE — 71045 X-RAY EXAM CHEST 1 VIEW: CPT | Mod: 26,76

## 2025-03-08 RX ORDER — HYDROMORPHONE/SOD CHLOR,ISO/PF 2 MG/10 ML
1 SYRINGE (ML) INJECTION ONCE
Refills: 0 | Status: DISCONTINUED | OUTPATIENT
Start: 2025-03-08 | End: 2025-03-08

## 2025-03-08 RX ADMIN — Medication 100 MILLIGRAM(S): at 00:28

## 2025-03-08 RX ADMIN — ATORVASTATIN CALCIUM 80 MILLIGRAM(S): 80 TABLET, FILM COATED ORAL at 21:04

## 2025-03-08 RX ADMIN — Medication 3 MILLILITER(S): at 18:15

## 2025-03-08 RX ADMIN — Medication 500 MILLIGRAM(S): at 11:05

## 2025-03-08 RX ADMIN — Medication 5 MILLIGRAM(S): at 00:27

## 2025-03-08 RX ADMIN — Medication 81 MILLIGRAM(S): at 11:05

## 2025-03-08 RX ADMIN — METHOCARBAMOL 500 MILLIGRAM(S): 500 TABLET, FILM COATED ORAL at 11:04

## 2025-03-08 RX ADMIN — Medication 1 TABLET(S): at 11:05

## 2025-03-08 RX ADMIN — Medication 300 MILLIGRAM(S): at 18:14

## 2025-03-08 RX ADMIN — BUDESONIDE 0.5 MILLIGRAM(S): 0.25 SUSPENSION RESPIRATORY (INHALATION) at 18:14

## 2025-03-08 RX ADMIN — APIXABAN 2.5 MILLIGRAM(S): 2.5 TABLET, FILM COATED ORAL at 11:05

## 2025-03-08 RX ADMIN — METOPROLOL SUCCINATE 25 MILLIGRAM(S): 50 TABLET, EXTENDED RELEASE ORAL at 11:05

## 2025-03-08 RX ADMIN — ATORVASTATIN CALCIUM 80 MILLIGRAM(S): 80 TABLET, FILM COATED ORAL at 00:28

## 2025-03-08 RX ADMIN — Medication 40 MILLIGRAM(S): at 11:05

## 2025-03-08 RX ADMIN — Medication 3 MILLILITER(S): at 23:33

## 2025-03-08 RX ADMIN — METOPROLOL SUCCINATE 25 MILLIGRAM(S): 50 TABLET, EXTENDED RELEASE ORAL at 21:04

## 2025-03-08 RX ADMIN — Medication 1 MILLIGRAM(S): at 10:17

## 2025-03-08 RX ADMIN — Medication 1 MILLIGRAM(S): at 10:47

## 2025-03-08 RX ADMIN — APIXABAN 2.5 MILLIGRAM(S): 2.5 TABLET, FILM COATED ORAL at 21:04

## 2025-03-08 RX ADMIN — Medication 1 APPLICATION(S): at 11:07

## 2025-03-08 RX ADMIN — Medication 100 MILLIGRAM(S): at 21:04

## 2025-03-08 RX ADMIN — Medication 1 APPLICATION(S): at 21:05

## 2025-03-08 RX ADMIN — MIRTAZAPINE 7.5 MILLIGRAM(S): 30 TABLET, FILM COATED ORAL at 00:28

## 2025-03-08 RX ADMIN — Medication 3 MILLILITER(S): at 11:06

## 2025-03-08 RX ADMIN — SODIUM ZIRCONIUM CYCLOSILICATE 10 GRAM(S): 5 POWDER, FOR SUSPENSION ORAL at 11:05

## 2025-03-08 RX ADMIN — METHOCARBAMOL 500 MILLIGRAM(S): 500 TABLET, FILM COATED ORAL at 18:14

## 2025-03-08 RX ADMIN — Medication 1 APPLICATION(S): at 01:49

## 2025-03-08 RX ADMIN — METHOCARBAMOL 500 MILLIGRAM(S): 500 TABLET, FILM COATED ORAL at 00:27

## 2025-03-08 RX ADMIN — Medication 5 MILLIGRAM(S): at 21:04

## 2025-03-08 RX ADMIN — Medication 1 SPRAY(S): at 18:14

## 2025-03-08 RX ADMIN — METHOCARBAMOL 500 MILLIGRAM(S): 500 TABLET, FILM COATED ORAL at 21:04

## 2025-03-08 RX ADMIN — MIRTAZAPINE 7.5 MILLIGRAM(S): 30 TABLET, FILM COATED ORAL at 21:04

## 2025-03-08 RX ADMIN — BUDESONIDE 0.5 MILLIGRAM(S): 0.25 SUSPENSION RESPIRATORY (INHALATION) at 11:06

## 2025-03-08 NOTE — PROGRESS NOTE ADULT - SUBJECTIVE AND OBJECTIVE BOX
VITAL SIGNS    Telemetry:  aflutter 60-90  Vital Signs Last 24 Hrs  T(C): 36.9 (03-08-25 @ 07:02), Max: 37.1 (03-07-25 @ 23:10)  T(F): 98.4 (03-08-25 @ 07:02), Max: 98.7 (03-07-25 @ 23:10)  HR: 62 (03-08-25 @ 08:26) (60 - 64)  BP: 149/66 (03-08-25 @ 07:02) (104/40 - 151/63)  RR: 18 (03-08-25 @ 08:26) (18 - 20)  SpO2: 100% (03-08-25 @ 08:26) (94% - 100%)            03-07 @ 07:01  -  03-08 @ 07:00  --------------------------------------------------------  IN: 200 mL / OUT: 2000 mL / NET: -1800 mL       Daily     Daily   Admit Wt: Drug Dosing Weight  Height (cm): 180.3 (30 Dec 2024 12:01)  Weight (kg): 85.1 (30 Dec 2024 12:01)  BMI (kg/m2): 26.2 (30 Dec 2024 12:01)  BSA (m2): 2.05 (30 Dec 2024 12:01)    Bilirubin Total: 0.4 mg/dL (03-07 @ 12:48)    CAPILLARY BLOOD GLUCOSE      POCT Blood Glucose.: 113 mg/dL (08 Mar 2025 07:58)  POCT Blood Glucose.: 110 mg/dL (08 Mar 2025 00:25)  POCT Blood Glucose.: 120 mg/dL (07 Mar 2025 12:29)          MEDICATIONS  acetaminophen     Tablet .. 650 milliGRAM(s) Oral every 6 hours PRN  apixaban 2.5 milliGRAM(s) Oral two times a day  ascorbic acid 500 milliGRAM(s) Oral daily  aspirin  chewable 81 milliGRAM(s) Oral daily  atorvastatin 80 milliGRAM(s) Oral at bedtime  buDESOnide    Inhalation Suspension 0.5 milliGRAM(s) Inhalation two times a day  chlorhexidine 2% Cloths 1 Application(s) Topical daily  chlorhexidine 4% Liquid 1 Application(s) Topical <User Schedule>  dextrose 50% Injectable 50 milliLiter(s) IV Push every 15 minutes  epoetin ignacia (EPOGEN) Injectable 87476 Unit(s) IV Push <User Schedule>  ferrous    sulfate Liquid 300 milliGRAM(s) Enteral Tube daily  insulin lispro (ADMELOG) corrective regimen sliding scale   SubCutaneous every 6 hours  lidocaine/prilocaine Cream 1 Application(s) Topical daily PRN  melatonin 5 milliGRAM(s) Oral at bedtime  methocarbamol 500 milliGRAM(s) Oral every 8 hours  metoprolol tartrate 25 milliGRAM(s) Oral two times a day  mirtazapine 7.5 milliGRAM(s) Oral at bedtime  Nephro-elicia 1 Tablet(s) Oral daily  pantoprazole  Injectable 40 milliGRAM(s) IV Push daily  sodium chloride 0.65% Nasal 1 Spray(s) Both Nostrils every 12 hours  sodium chloride 0.9% for Nebulization 3 milliLiter(s) Nebulizer every 6 hours  sodium chloride 0.9% lock flush 10 milliLiter(s) IV Push every 1 hour PRN  sodium chloride 0.9%. 1000 milliLiter(s) IV Continuous <Continuous>  sodium zirconium cyclosilicate 10 Gram(s) Oral <User Schedule>  traZODone 100 milliGRAM(s) Oral at bedtime      >>> <<<  PHYSICAL EXAM  Subjective: NAD  Neurology: alert and oriented x 3, nonfocal, no gross deficits  CV :s1s2, trach c/c/i  Sternal Wound :  CDI , Stable  Lungs:cta  Abdomen: soft, NT,ND, ( +)BM  :  voiding  Extremities:  -c/c/e     LABS  03-07    135  |  93[L]  |  29[H]  ----------------------------<  108[H]  4.1   |  27  |  5.31[H]    Ca    9.2      07 Mar 2025 12:48  Phos  4.9     03-07  Mg     2.5     03-07    TPro  6.6  /  Alb  3.0[L]  /  TBili  0.4  /  DBili  x   /  AST  20  /  ALT  10  /  AlkPhos  93  03-07                                 8.7    10.06 )-----------( 232      ( 07 Mar 2025 12:48 )             27.5                 PAST MEDICAL & SURGICAL HISTORY:  Type 2 diabetes mellitus      Hypertension      End stage renal disease  started HD 2/2019 T, Th, Sat via right chest permacath      Bone spur  right shoulder- hx of - sx done      Anemia      Injury of right wrist  hx of at age 14      History of hyperkalemia  before HD- K-6.2 - to repeat in am of sx, pt had HD today      S/P arthroscopy of right shoulder  2010      History of vascular access device  right chest permacath - 2/2019      H/O right wrist surgery  tendons repair - at age 14      AV fistula      H/O ventral hernia repair      S/P cholecystectomy

## 2025-03-08 NOTE — PROGRESS NOTE ADULT - ASSESSMENT
54M with PMH of HTN, HLD, ESRD on HD MWF via LUE AVF, ascites (requiring repeat paracenteses q4-6wks), NICM with moderate LV dysfunction w/ EF 35-40%() and CAD s/p atherectomy + SALLIE to D1(2024) presents to [facility] transferred from [facility].  Cardiac cath which showed pLAD 70% ISR, mLCx 70%, D1 severe dz.   Patient now transferred to [facility] CTS Dr. Rivers for CABG eval      # CAD s/p NSTEMI  Hx CAD s/p PCI LAD   Presented with ADHF elevated troponins  Positive NST1-Cath- pLAD 70% ISR, mLCx 70%, D1 severe dz.   TTE EF 35% Severe TRWas on Plavix-p2y12- 321 been off Plavix since -POD#1-C3L free LIMA-LAD; SVG-Diag; YFA-[id number]-Ogqrsysjg on  Sinus rhythm,Amio for AF prophylaxis   Atrial Flutter on TC during day Ambulating  -Increased congestion on CXR had HD yesterday remains in AFl   -Atrial Flutter HFTC 40L/30% BP stable     -Atrial Flutter tolerating TC for HD MWF-consider DOAC   OOB Abd US moderate 4 quad ascites noted remains in Atrial Flutter~~70's   TC Atrial Flutter  -s/p Bronch/BAL remains in AFl  Trach downsized 7   -Tolerating TC AFl rate controlled On Heparin gtt transition to DOAC  -Tx to SDU on Eliquis Atrial Flutter  -VSS Atrial Flutter rate controlled on Eliquis for AC      # Acute on Chronic Systolic Heart Failure now improved  EF-35% ,severe TR  Had HD post op  GDMT post op  LVEF improved post bypass   -TTE EF 40%,trace effusion  ECG c/w pericarditis-was on colchicine   01/15-TTE EF 55%  -TTE EF 60%   -Normal LV systolic function Moderate pericardial effusion  -On TC-HF and Vent support at nights   02/10-Vent HS with T Collar trial Ambulated Remains in AF  Repeat TTE Normal LV Systolic function Small Pericardial effusion decreased from 2/3        Vascular evaluated  AVGraft /?steal causing RV failure-'' Very low suspicion of steal Sd and AVF causing current clinical state. ''   VA Duplex Hemodialysis Access, Left (25 @ 13:35) >   Arterial flow volume of 1301 mL/m, and the venous flow volume of  1492 mL/m are consistent with a normally functioning dialysis access  fistula.      # Ascites  Hx chronic ascites  Has drainage q4-6 weeks  Last drained -4600mL  ? ascites related to severe TR  Had qenjdkqyaudb-Isbzoma-ww growth   CT Abdomen 01/10-Large volume ascites-  US abdomen--Small to moderate volume abdominal/pelvic ascites  US abdomen  Moderate ascites  US Abdomen -Moderate 4 quad ascites    # ESRD  Now on  HD-MWF

## 2025-03-08 NOTE — PROGRESS NOTE ADULT - SUBJECTIVE AND OBJECTIVE BOX
Dr. Yousif  Office [phone] (9 am to 5 pm)  Service: [phone] (5pm to 9am)  Chanell RENTERIA      RENAL PROGRESS NOTE: DATE OF SERVICE 03-08-25 @ 12:22    Patient is a 54y old  Male who presents with a chief complaint of CAD (08 Mar 2025 09:57)      Patient seen and examined at bedside. No chest pain/sob    VITALS:  T(F): 98.4 (03-08-25 @ 07:02), Max: 98.7 (03-07-25 @ 23:10)  HR: 80 (03-08-25 @ 11:02)  BP: 123/58 (03-08-25 @ 11:02)  RR: 18 (03-08-25 @ 11:02)  SpO2: 95% (03-08-25 @ 11:02)  Wt(kg): --    03-07 @ 07:01  -  03-08 @ 07:00  --------------------------------------------------------  IN: 200 mL / OUT: 2000 mL / NET: -1800 mL          PHYSICAL EXAM:  Constitutional: NAD  Neck: No JVD  Respiratory: CTAB, no wheezes, rales or rhonchi  Cardiovascular: S1, S2, RRR  Gastrointestinal: BS+, soft, NT/ND  Extremities: No peripheral edema    Hospital Medications:   MEDICATIONS  (STANDING):  apixaban 2.5 milliGRAM(s) Oral two times a day  ascorbic acid 500 milliGRAM(s) Oral daily  aspirin  chewable 81 milliGRAM(s) Oral daily  atorvastatin 80 milliGRAM(s) Oral at bedtime  buDESOnide    Inhalation Suspension 0.5 milliGRAM(s) Inhalation two times a day  chlorhexidine 2% Cloths 1 Application(s) Topical daily  chlorhexidine 4% Liquid 1 Application(s) Topical <User Schedule>  dextrose 50% Injectable 50 milliLiter(s) IV Push every 15 minutes  epoetin ignacia (EPOGEN) Injectable 31532 Unit(s) IV Push <User Schedule>  ferrous    sulfate Liquid 300 milliGRAM(s) Enteral Tube daily  insulin lispro (ADMELOG) corrective regimen sliding scale   SubCutaneous every 6 hours  melatonin 5 milliGRAM(s) Oral at bedtime  methocarbamol 500 milliGRAM(s) Oral every 8 hours  metoprolol tartrate 25 milliGRAM(s) Oral two times a day  mirtazapine 7.5 milliGRAM(s) Oral at bedtime  Nephro-elicia 1 Tablet(s) Oral daily  pantoprazole  Injectable 40 milliGRAM(s) IV Push daily  sodium chloride 0.65% Nasal 1 Spray(s) Both Nostrils every 12 hours  sodium chloride 0.9% for Nebulization 3 milliLiter(s) Nebulizer every 6 hours  sodium chloride 0.9%. 1000 milliLiter(s) (10 mL/Hr) IV Continuous <Continuous>  sodium zirconium cyclosilicate 10 Gram(s) Oral <User Schedule>  traZODone 100 milliGRAM(s) Oral at bedtime      LABS:  03-07    135  |  93[L]  |  29[H]  ----------------------------<  108[H]  4.1   |  27  |  5.31[H]    Ca    9.2      07 Mar 2025 12:48  Phos  4.9     03-07  Mg     2.5     03-07    TPro  6.6  /  Alb  3.0[L]  /  TBili  0.4  /  DBili      /  AST  20  /  ALT  10  /  AlkPhos  93  03-07    Creatinine Trend: 5.31 <--, 4.92 <--, 3.99 <--, 5.47 <--, 4.89 <--, 4.32 <--    Albumin: 3.0 g/dL (03-07 @ 12:48)  Phosphorus: 4.9 mg/dL (03-07 @ 12:48)                              8.7    10.06 )-----------( 232      ( 07 Mar 2025 12:48 )             27.5     Urine Studies:  Urinalysis - [03-07-25 @ 12:48]      Color  / Appearance  / SG  / pH       Gluc 108 / Ketone   / Bili  / Urobili        Blood  / Protein  / Leuk Est  / Nitrite       RBC  / WBC  / Hyaline  / Gran  / Sq Epi  / Non Sq Epi  / Bacteria       Iron 29, TIBC 143, %sat 20      [12-19-24 @ 05:00]  Ferritin 904      [12-19-24 @ 05:00]  TSH 4.75      [12-24-24 @ 06:50]        RADIOLOGY & ADDITIONAL STUDIES:

## 2025-03-08 NOTE — PROGRESS NOTE ADULT - PROBLEM SELECTOR PLAN 2
Renal following   HD Monday / Wednesday / Friday  Continue Epogen 10,000 units IV TIW on Monday / Wednesday / Friday with HD   Continue Ferrous Sulfate 300 via NGT daily   Continue Nephro-Lisette 1 tab PO vis NGT daily

## 2025-03-08 NOTE — PROGRESS NOTE ADULT - ASSESSMENT
54M with PMH of HTN, HLD, ESRD on HD MWF via LUE AVF, ascites (requiring repeat paracenteses q4-6wks), NICM with moderate LV dysfunction w/ EF 35-40%(2023) and CAD s/p atherectomy + SALLIE to D1(4/11/2024) presents to [facility] transferred from [facility] for CABG eval  Nephro on board for HD needs.    ESRD on HD   Nephrologist Dr. Bailey  [facility] Schedule / Access:  LUE AVF  Center: [facility]  Pt s/p HD on 2/19 and 2/21.  s/p HD 2/24 uf 1.5L  S/p HD on 02/26 2 L UF and 02/28 2 L UF again   S/p HD on 03/03 and 03/5, 03/07  HD on Monday  Still requires 5L O2, etiology unclear, clinically euvolemic. Please consider CXR to see if has pulmonary edema and let me know so I can arrange for more UF-d/w CTNP  Keep MAP>65  Renal Diet  HD consent in chart     Hyper/Hypokalemia  Pt intermittently Hyperkalemia  HD as scheduled  now improved  Low K diet.  C/W Lokelma 10gm on nonHD days.  Monitor K.    HTN  BP fluctuating; controlled.  UF w/ HD as BP permits.  Low sodium diet.  Management per ICU  Monitor BP     CKD MBD  Check PTH   Low PO4 diet.  Not on binder.  Monitor Ca , PO4 daily     Anemia  CRISTIANE with HD  Repeat iron studies.  Transfuse if hgb <7    CAD with multivessel disease  CTICU following  s/p CABG 12/30    Hypoxic respiratory failure   S/p tracheostomy 01/18 now on trach collar  Per surgery  S/p bronchoscopy, pulm following

## 2025-03-08 NOTE — PROGRESS NOTE ADULT - ASSESSMENT
53 yo M with multiple medical problems including CAD s/p PCI in 2024 s/p CABG x 3 on 24    1/: Intubated for hypoxia & left lung white out s/p awake bronch with removal of copious mucopurulent secretions  : s/p IABP insertion, sepsis/septic shock due to E coli   ESBL pneumonia, collapsed Left Lung, s/p multiple bronch    tracheostomy tube placement    VRE E. Faecium Bcx   +HSV PCR BAL   tissue cx from back abscess - Serratia/MRSA  - - intermittent bradycardia/junctional episodes with hypotension  2/3: off , off theophylline. iHD 1L UF  : bronch for Left lung collapse wound vac, 2decho w/ moderate pericardial effusion - similar as before, US abd: small - moderate ascites - monitor at this time.  Wound vac placed for sacral wounds  : iHD-1L UF  : bronch today off positive pressure   : concern for left food drop   2/10 : no acute issues - CXR shows improving left sided aeration, pt subjectively reports having difficulty while lying flat  : s/p Paracentesis 3.5 L removed, leukocytosis   : Ascites fluid PMN < 250 (less likely SBP)   sniff test yesterday showed paradoxical diaphragmatic motion    : mucus plugging but able to clear airway with aggressive pulmonary toileting.   : no vent requirement overnight, able to mobilize secretion with pulm toileting  hyperkalemia post HD - workup for possible recirculation underway   : On TC X 48 hours - ambulating well, no mucus plugging events  : HFTC x24hrs (40L 30%)  : iHD 1.5L UF  : iHD 2L  : trach downsized to 7, bronch  - cultures growing ESBL E Coli, sacral wound vac changed   3/6 Transfer to SDU, +PW isolated, +sacral wound vac in place, + Tube feeds running, next HD scheduled for tomorrow   3/7 VSS; Continue with current medication regimen.  Continue on TC 40% 02.  Plan for HD today.  Nocturnal feeding.  3/8  FEES 3/3 notable for poor secretion management evidenced unable to clear 2/2 weak cough B/l vocal cords appeared to be adducted at midline. ENT eval demonstrates  limited vocal cord abduction vs. questionable vocal cord paresis--no plan for ENT intervention at this time, No PMV . Local sacral wound care by PT.   Disposition: Home PT / OT once medically cleared.        55 yo M with multiple medical problems including CAD s/p PCI in 2024 s/p CABG x 3 on 24    1/: Intubated for hypoxia & left lung white out s/p awake bronch with removal of copious mucopurulent secretions  : s/p IABP insertion, sepsis/septic shock due to E coli   ESBL pneumonia, collapsed Left Lung, s/p multiple bronch    tracheostomy tube placement    VRE E. Faecium Bcx   +HSV PCR BAL   tissue cx from back abscess - Serratia/MRSA  - - intermittent bradycardia/junctional episodes with hypotension  2/3: off , off theophylline. iHD 1L UF  : bronch for Left lung collapse wound vac, 2decho w/ moderate pericardial effusion - similar as before, US abd: small - moderate ascites - monitor at this time.  Wound vac placed for sacral wounds  : iHD-1L UF  : bronch today off positive pressure   : concern for left food drop   2/10 : no acute issues - CXR shows improving left sided aeration, pt subjectively reports having difficulty while lying flat  : s/p Paracentesis 3.5 L removed, leukocytosis   : Ascites fluid PMN < 250 (less likely SBP)   sniff test yesterday showed paradoxical diaphragmatic motion    : mucus plugging but able to clear airway with aggressive pulmonary toileting.   : no vent requirement overnight, able to mobilize secretion with pulm toileting  hyperkalemia post HD - workup for possible recirculation underway   : On TC X 48 hours - ambulating well, no mucus plugging events  : HFTC x24hrs (40L 30%)  : iHD 1.5L UF  : iHD 2L  : trach downsized to 7, bronch  - cultures growing ESBL E Coli, sacral wound vac changed   3/6 Transfer to SDU, +PW isolated, +sacral wound vac in place, + Tube feeds running, next HD scheduled for tomorrow   3/7 VSS; Continue with current medication regimen.  Continue on TC 40% 02.  Plan for HD today.  Nocturnal feeding.  3/8  FEES 3/3 notable for poor secretion management evidenced unable to clear 2/2 weak cough B/l vocal cords appeared to be adducted at midline. ENT eval demonstrates  limited vocal cord abduction vs. questionable vocal cord paresis--no plan for ENT intervention at this time, No PMV . Local sacral wound care by PT.   Disposition: Home PT / OT

## 2025-03-08 NOTE — PROGRESS NOTE ADULT - PROBLEM SELECTOR PLAN 1
Continue with ASA 81 mg PO Daily.   Continue with Lopressor 25 mg PO BID   Continue with Lipitor 80 mg PO HS  Maintain + PW isolated  Trach collar in place #7 cuffless    Increase activity as tolerated.   Encourage Chest PT / Pulmonary toileting and Incentive spirometry every 1 hour x 10 while awake.   Continue with Protonix 40 mg IV daily for PUD prophylaxis.   Sternal precautions and wound care  Daily Shower   D/C plan home with PT/OT once medically cleared   Plan of care discussed with attending

## 2025-03-08 NOTE — PROGRESS NOTE ADULT - SUBJECTIVE AND OBJECTIVE BOX
MR#[id number]  PATIENT NAME:RAAD CANO    DATE OF SERVICE: 03-08-25 @ 07:13  Patient was seen and examined by Solo Quick MD on    03-08-25 @ 07:13 .  Interim events noted.Consultant notes ,Labs,Telemetry reviewed by me       HOSPITAL COURSE: HPI:  54M with PMH of HTN, HLD, ESRD on HD MWF via LUE AVF, ascites (requiring repeat paracenteses q4-6wks), NICM with moderate LV dysfunction w/ EF 35-40%(2023) and CAD s/p atherectomy + SALLIE to D1(4/11/2024) presents to [facility] transferred from [facility].  Cardiac cath which showed pLAD 70% ISR, mLCx 70%, D1 severe dz.   Patient now transferred to [facility] CTS Dr. Rivers for CABG eval.     (24 Dec 2024 05:07)      INTERIM EVENTS:Patient seen at bedside ,interim events noted.  12/23 Cardiac cath  pLAD 70% ISR, mLCx 70%, D1 severe dz.   12/31-POD#1-C3L free LIMA-LAD; SVG-Diag; SVG-leftPL Awake OOB extubated Sinus rhythm on Dobutamine gtt  03/04-Awake Had FEES still NPO Atrial Flutter   03/06-Tx to SDU  03/07-Awake Atrial Flutter   03/08-Awake VSS Atrial Flutter still on NGT      PMH -reviewed admission note, no change since admission    AMBULATION: Assisted[ ] Cane/walker[x ] Independent[ ]    MEDICATIONS  (STANDING):  apixaban 2.5 milliGRAM(s) Oral two times a day  ascorbic acid 500 milliGRAM(s) Oral daily  aspirin  chewable 81 milliGRAM(s) Oral daily  atorvastatin 80 milliGRAM(s) Oral at bedtime  buDESOnide    Inhalation Suspension 0.5 milliGRAM(s) Inhalation two times a day  chlorhexidine 2% Cloths 1 Application(s) Topical daily  chlorhexidine 4% Liquid 1 Application(s) Topical <User Schedule>  dextrose 50% Injectable 50 milliLiter(s) IV Push every 15 minutes  epoetin ignacia (EPOGEN) Injectable 05722 Unit(s) IV Push <User Schedule>  ferrous    sulfate Liquid 300 milliGRAM(s) Enteral Tube daily  insulin lispro (ADMELOG) corrective regimen sliding scale   SubCutaneous every 6 hours  melatonin 5 milliGRAM(s) Oral at bedtime  methocarbamol 500 milliGRAM(s) Oral every 8 hours  metoprolol tartrate 25 milliGRAM(s) Oral two times a day  mirtazapine 7.5 milliGRAM(s) Oral at bedtime  Nephro-elicia 1 Tablet(s) Oral daily  pantoprazole  Injectable 40 milliGRAM(s) IV Push daily  sodium chloride 0.65% Nasal 1 Spray(s) Both Nostrils every 12 hours  sodium chloride 0.9% for Nebulization 3 milliLiter(s) Nebulizer every 6 hours  sodium chloride 0.9%. 1000 milliLiter(s) (10 mL/Hr) IV Continuous <Continuous>  sodium zirconium cyclosilicate 10 Gram(s) Oral <User Schedule>  traZODone 100 milliGRAM(s) Oral at bedtime    MEDICATIONS  (PRN):  acetaminophen     Tablet .. 650 milliGRAM(s) Oral every 6 hours PRN Mild Pain (1 - 3)  lidocaine/prilocaine Cream 1 Application(s) Topical daily PRN pre-HD  sodium chloride 0.9% lock flush 10 milliLiter(s) IV Push every 1 hour PRN Pre/post blood products, medications, blood draw, and to maintain line patency            REVIEW OF SYSTEMS:  Constitutional: [ ] fever, [ ]weight loss,  [ ]fatigue [ ]weight gain  Eyes: [ ] visual changes  Respiratory: [ ]shortness of breath;  [ ] cough, [ ]wheezing, [ ]chills, [ ]hemoptysis  Cardiovascular: [ ] chest pain, [ ]palpitations, [ ]dizziness,  [ ]leg swelling[ ]orthopnea[ ]PND  Gastrointestinal: [ ] abdominal pain, [ ]nausea, [ ]vomiting,  [ ]diarrhea [ ]Constipation [ ]Melena  Genitourinary: [ ] dysuria, [ ] hematuria [ ]Vigil  Neurologic: [ ] headaches [ ] tremors[ ]weakness [ ]Paralysis Right[ ] Left[ ]  Skin: [ ] itching, [ ]burning, [ ] rashes  Endocrine: [ ] heat or cold intolerance  Musculoskeletal: [ ] joint pain or swelling; [ ] muscle, back, or extremity pain  Psychiatric: [ ] depression, [ ]anxiety, [ ]mood swings, or [ ]difficulty sleeping  Hematologic: [ ] easy bruising, [ ] bleeding gums    [ ] All remaining systems negative except as per above.   [ ]Unable to obtain.  [x] No change in ROS since admission      Vital Signs Last 24 Hrs  T(C): 37.1 (07 Mar 2025 23:10), Max: 37.1 (07 Mar 2025 23:10)  T(F): 98.7 (07 Mar 2025 23:10), Max: 98.7 (07 Mar 2025 23:10)  HR: 62 (08 Mar 2025 05:48) (60 - 64)  BP: 108/57 (07 Mar 2025 23:10) (104/40 - 151/63)  BP(mean): 73 (07 Mar 2025 23:10) (58 - 90)  RR: 18 (08 Mar 2025 05:48) (18 - 20)  SpO2: 100% (08 Mar 2025 05:48) (94% - 100%)    Parameters below as of 08 Mar 2025 05:48  Patient On (Oxygen Delivery Method): tracheostomy collar    O2 Concentration (%): 40  I&O's Summary    07 Mar 2025 07:01  -  08 Mar 2025 07:00  --------------------------------------------------------  IN: 200 mL / OUT: 2000 mL / NET: -1800 mL        PHYSICAL EXAM:  General: No acute distress BMI-28  HEENT: EOMI, PERRL  Neck: Supple, [ ] JVD[x] Trach  Lungs: Equal air entry bilaterally; [ ] rales [ ] wheezing [ ] rhonchi  Heart: Regular rate and rhythm; [x ] murmur   2/6 [ x] systolic [ ] diastolic [ ] radiation[ ] rubs [ ]  gallops  Abdomen: Nontender, bowel sounds present  Extremities: No clubbing, cyanosis, [ ] edema [ ]Pulses  equal and intact  Nervous system:  Alert & Oriented X3, no focal deficits  Psychiatric: Normal affect  Skin: No rashes or lesions    LABS:  03-07    135  |  93[L]  |  29[H]  ----------------------------<  108[H]  4.1   |  27  |  5.31[H]    Ca    9.2      07 Mar 2025 12:48  Phos  4.9     03-07  Mg     2.5     03-07    TPro  6.6  /  Alb  3.0[L]  /  TBili  0.4  /  DBili  x   /  AST  20  /  ALT  10  /  AlkPhos  93  03-07    Creatinine Trend: 5.31<--, 4.92<--, 3.99<--, 5.47<--, 4.89<--, 4.32<--                        8.7    10.06 )-----------( 232      ( 07 Mar 2025 12:48 )             27.5      Xray Chest 1 View- PORTABLE-Urgent (Xray Chest 1 View- PORTABLE-Urgent .) (03.06.25 @ 15:53) >  COMPARISON: Chest x-ray dated 3/5/2025.    FINDINGS:  Heart/Mediastinum: Cardiomediastinal silhouette is normal, unchanged    Lungs/Pleura: Trace left pleural effusion adjacent, passive basilar  atelectasis. No focal consolidation. No pneumothorax. Normal pulmonary  vasculature    Osseous Structures: No acutefindings.    Lines/Tubes: Enteric tube coursing below left hemidiaphragm.    IMPRESSION:  Trace left pleural effusion/adjacent atelectasis, slightly decreased from  prior x-ray.           TTE Limited W or WO Ultrasound Enhancing Agent (02.18.25 @ 13:54) >  CONCLUSIONS:      1. Left ventricular systolic function is normal.   2. Enlarged right ventricular cavity size and mildly reduced right ventricular systolic function.   3. Left pleural effusion noted.   4. Trace pericardial effusion.

## 2025-03-09 RX ORDER — ONDANSETRON HCL/PF 4 MG/2 ML
4 VIAL (ML) INJECTION ONCE
Refills: 0 | Status: COMPLETED | OUTPATIENT
Start: 2025-03-09 | End: 2025-03-09

## 2025-03-09 RX ORDER — ONDANSETRON HCL/PF 4 MG/2 ML
4 VIAL (ML) INJECTION ONCE
Refills: 0 | Status: DISCONTINUED | OUTPATIENT
Start: 2025-03-09 | End: 2025-03-09

## 2025-03-09 RX ORDER — HYDROMORPHONE/SOD CHLOR,ISO/PF 2 MG/10 ML
0.5 SYRINGE (ML) INJECTION EVERY 6 HOURS
Refills: 0 | Status: DISCONTINUED | OUTPATIENT
Start: 2025-03-09 | End: 2025-03-14

## 2025-03-09 RX ADMIN — BUDESONIDE 0.5 MILLIGRAM(S): 0.25 SUSPENSION RESPIRATORY (INHALATION) at 05:35

## 2025-03-09 RX ADMIN — APIXABAN 2.5 MILLIGRAM(S): 2.5 TABLET, FILM COATED ORAL at 22:37

## 2025-03-09 RX ADMIN — Medication 300 MILLIGRAM(S): at 13:44

## 2025-03-09 RX ADMIN — Medication 500 MILLIGRAM(S): at 13:43

## 2025-03-09 RX ADMIN — Medication 650 MILLIGRAM(S): at 06:22

## 2025-03-09 RX ADMIN — Medication 650 MILLIGRAM(S): at 05:34

## 2025-03-09 RX ADMIN — METHOCARBAMOL 500 MILLIGRAM(S): 500 TABLET, FILM COATED ORAL at 05:34

## 2025-03-09 RX ADMIN — Medication 1 TABLET(S): at 13:43

## 2025-03-09 RX ADMIN — METHOCARBAMOL 500 MILLIGRAM(S): 500 TABLET, FILM COATED ORAL at 22:33

## 2025-03-09 RX ADMIN — Medication 0.5 MILLIGRAM(S): at 16:53

## 2025-03-09 RX ADMIN — Medication 5 MILLIGRAM(S): at 22:33

## 2025-03-09 RX ADMIN — ATORVASTATIN CALCIUM 80 MILLIGRAM(S): 80 TABLET, FILM COATED ORAL at 22:33

## 2025-03-09 RX ADMIN — Medication 0.5 MILLIGRAM(S): at 17:23

## 2025-03-09 RX ADMIN — Medication 3 MILLILITER(S): at 12:02

## 2025-03-09 RX ADMIN — METOPROLOL SUCCINATE 25 MILLIGRAM(S): 50 TABLET, EXTENDED RELEASE ORAL at 05:34

## 2025-03-09 RX ADMIN — Medication 1 APPLICATION(S): at 10:35

## 2025-03-09 RX ADMIN — Medication 1 SPRAY(S): at 05:31

## 2025-03-09 RX ADMIN — Medication 3 MILLILITER(S): at 05:35

## 2025-03-09 RX ADMIN — Medication 40 MILLIGRAM(S): at 12:02

## 2025-03-09 RX ADMIN — MIRTAZAPINE 7.5 MILLIGRAM(S): 30 TABLET, FILM COATED ORAL at 22:33

## 2025-03-09 RX ADMIN — Medication 81 MILLIGRAM(S): at 13:43

## 2025-03-09 RX ADMIN — BUDESONIDE 0.5 MILLIGRAM(S): 0.25 SUSPENSION RESPIRATORY (INHALATION) at 18:27

## 2025-03-09 RX ADMIN — Medication 3 MILLILITER(S): at 18:27

## 2025-03-09 RX ADMIN — APIXABAN 2.5 MILLIGRAM(S): 2.5 TABLET, FILM COATED ORAL at 05:34

## 2025-03-09 RX ADMIN — Medication 4 MILLIGRAM(S): at 19:26

## 2025-03-09 RX ADMIN — METHOCARBAMOL 500 MILLIGRAM(S): 500 TABLET, FILM COATED ORAL at 13:44

## 2025-03-09 RX ADMIN — Medication 100 MILLIGRAM(S): at 22:33

## 2025-03-09 NOTE — PROGRESS NOTE ADULT - ASSESSMENT
54M with PMH of HTN, HLD, ESRD on HD MWF via LUE AVF, ascites (requiring repeat paracenteses q4-6wks), NICM with moderate LV dysfunction w/ EF 35-40%(2023) and CAD s/p atherectomy + SALLIE to D1(4/11/2024) presents to [facility] transferred from [facility] for CABG eval  Nephro on board for HD needs.    ESRD on HD   Nephrologist Dr. Bailey  [facility] Schedule / Access:  LUE AVF  Center: [facility]  Pt s/p HD on 2/19 and 2/21.  s/p HD 2/24 uf 1.5L  S/p HD on 02/26 2 L UF and 02/28 2 L UF again   S/p HD on 03/03 and 03/5, 03/07  HD on Monday  Still requires 5L O2, etiology unclear, clinically euvolemic. CXR has only stable small pleural effusion. Likely needs chest PT  Keep MAP>65  Renal Diet  HD consent in chart     Hyper/Hypokalemia  Pt intermittently Hyperkalemia  HD as scheduled  now improved  Low K diet.  C/W Lokelma 10gm on nonHD days.  Monitor K.    HTN  BP fluctuating; controlled.  UF w/ HD as BP permits.  Low sodium diet.  Management per ICU  Monitor BP     CKD MBD  Check PTH   Low PO4 diet.  Not on binder.  Monitor Ca , PO4 daily     Anemia  CRISTIANE with HD  Repeat iron studies.  Transfuse if hgb <7    CAD with multivessel disease  CTICU following  s/p CABG 12/30    Hypoxic respiratory failure   S/p tracheostomy 01/18 now on trach collar  Per surgery  S/p bronchoscopy, pulm following

## 2025-03-09 NOTE — PROGRESS NOTE ADULT - ASSESSMENT
53 yo M with multiple medical problems including CAD s/p PCI in 2024 s/p CABG x 3 on 24    1/: Intubated for hypoxia & left lung white out s/p awake bronch with removal of copious mucopurulent secretions  : s/p IABP insertion, sepsis/septic shock due to E coli   ESBL pneumonia, collapsed Left Lung, s/p multiple bronch    tracheostomy tube placement    VRE E. Faecium Bcx   +HSV PCR BAL   tissue cx from back abscess - Serratia/MRSA  - - intermittent bradycardia/junctional episodes with hypotension  2/3: off , off theophylline. iHD 1L UF  : bronch for Left lung collapse wound vac, 2decho w/ moderate pericardial effusion - similar as before, US abd: small - moderate ascites - monitor at this time.  Wound vac placed for sacral wounds  : iHD-1L UF  : bronch today off positive pressure   : concern for left food drop   2/10 : no acute issues - CXR shows improving left sided aeration, pt subjectively reports having difficulty while lying flat  : s/p Paracentesis 3.5 L removed, leukocytosis   : Ascites fluid PMN < 250 (less likely SBP)   sniff test yesterday showed paradoxical diaphragmatic motion    : mucus plugging but able to clear airway with aggressive pulmonary toileting.   : no vent requirement overnight, able to mobilize secretion with pulm toileting  hyperkalemia post HD - workup for possible recirculation underway   : On TC X 48 hours - ambulating well, no mucus plugging events  : HFTC x24hrs (40L 30%)  : iHD 1.5L UF  : iHD 2L  : trach downsized to 7, bronch  - cultures growing ESBL E Coli, sacral wound vac changed   3/6 Transfer to SDU, +PW isolated, +sacral wound vac in place, + Tube feeds running, next HD scheduled for tomorrow   3/7 VSS; Continue with current medication regimen.  Continue on TC 40% 02.  Plan for HD today.  Nocturnal feeding.  3/8  FEES 3/3 notable for poor secretion management evidenced unable to clear 2/2 weak cough B/l vocal cords appeared to be adducted at midline. ENT eval demonstrates  limited vocal cord abduction vs. questionable vocal cord paresis--no plan for ENT intervention at this time, No PMV . Local sacral wound care by PT.   3/9 HD tomorrow, US abd in am Tolerating pm feeds  Disposition: Home PT / OT

## 2025-03-09 NOTE — PROGRESS NOTE ADULT - SUBJECTIVE AND OBJECTIVE BOX
VITAL SIGNS    Telemetry:  aflutter 60-80  Vital Signs Last 24 Hrs  T(C): 36.7 (03-09-25 @ 03:54), Max: 36.8 (03-08-25 @ 15:10)  T(F): 98.1 (03-09-25 @ 03:54), Max: 98.3 (03-08-25 @ 23:12)  HR: 62 (03-09-25 @ 08:54) (61 - 80)  BP: 137/56 (03-08-25 @ 23:12) (123/58 - 141/64)  RR: 18 (03-09-25 @ 08:54) (18 - 20)  SpO2: 98% (03-09-25 @ 08:54) (94% - 100%)            03-08 @ 06:01  -  03-09 @ 07:00  --------------------------------------------------------  IN: 740 mL / OUT: 1 mL / NET: 739 mL    Daily   Admit Wt: Drug Dosing Weight  Height (cm): 180.3 (30 Dec 2024 12:01)  Weight (kg): 85.1 (30 Dec 2024 12:01)  BMI (kg/m2): 26.2 (30 Dec 2024 12:01)  BSA (m2): 2.05 (30 Dec 2024 12:01)      CAPILLARY BLOOD GLUCOSE      POCT Blood Glucose.: 89 mg/dL (08 Mar 2025 23:32)  POCT Blood Glucose.: 100 mg/dL (08 Mar 2025 17:55)  POCT Blood Glucose.: 87 mg/dL (08 Mar 2025 12:03)          MEDICATIONS  acetaminophen     Tablet .. 650 milliGRAM(s) Oral every 6 hours PRN  apixaban 2.5 milliGRAM(s) Oral two times a day  ascorbic acid 500 milliGRAM(s) Oral daily  aspirin  chewable 81 milliGRAM(s) Oral daily  atorvastatin 80 milliGRAM(s) Oral at bedtime  buDESOnide    Inhalation Suspension 0.5 milliGRAM(s) Inhalation two times a day  chlorhexidine 2% Cloths 1 Application(s) Topical daily  chlorhexidine 4% Liquid 1 Application(s) Topical <User Schedule>  dextrose 50% Injectable 50 milliLiter(s) IV Push every 15 minutes  epoetin ignacia (EPOGEN) Injectable 01336 Unit(s) IV Push <User Schedule>  ferrous    sulfate Liquid 300 milliGRAM(s) Enteral Tube daily  insulin lispro (ADMELOG) corrective regimen sliding scale   SubCutaneous every 6 hours  lidocaine/prilocaine Cream 1 Application(s) Topical daily PRN  melatonin 5 milliGRAM(s) Oral at bedtime  methocarbamol 500 milliGRAM(s) Oral every 8 hours  metoprolol tartrate 25 milliGRAM(s) Oral two times a day  mirtazapine 7.5 milliGRAM(s) Oral at bedtime  Nephro-elicia 1 Tablet(s) Oral daily  pantoprazole  Injectable 40 milliGRAM(s) IV Push daily  sodium chloride 0.65% Nasal 1 Spray(s) Both Nostrils every 12 hours  sodium chloride 0.9% for Nebulization 3 milliLiter(s) Nebulizer every 6 hours  sodium chloride 0.9% lock flush 10 milliLiter(s) IV Push every 1 hour PRN  sodium chloride 0.9%. 1000 milliLiter(s) IV Continuous <Continuous>  sodium zirconium cyclosilicate 10 Gram(s) Oral <User Schedule>  traZODone 100 milliGRAM(s) Oral at bedtime      >>> <<<  PHYSICAL EXAM  Subjective: NAD   Neurology: alert and oriented x 3, nonfocal, no gross deficits  CV : s1s2  Sternal Wound :  CDI , Stable  Lungs: cta  Abdomen: soft, NT,ND, (+ )BM  :  voiding  Extremities:  -c/c/e   sacral decubitus    LABS  03-07    135  |  93[L]  |  29[H]  ----------------------------<  108[H]  4.1   |  27  |  5.31[H]    Ca    9.2      07 Mar 2025 12:48  Phos  4.9     03-07  Mg     2.5     03-07    TPro  6.6  /  Alb  3.0[L]  /  TBili  0.4  /  DBili  x   /  AST  20  /  ALT  10  /  AlkPhos  93  03-07                                 8.7    10.06 )-----------( 232      ( 07 Mar 2025 12:48 )             27.5                 PAST MEDICAL & SURGICAL HISTORY:  Type 2 diabetes mellitus      Hypertension      End stage renal disease  started HD 2/2019 T, Th, Sat via right chest permacath      Bone spur  right shoulder- hx of - sx done      Anemia      Injury of right wrist  hx of at age 14      History of hyperkalemia  before HD- K-6.2 - to repeat in am of sx, pt had HD today      S/P arthroscopy of right shoulder  2010      History of vascular access device  right chest permacath - 2/2019      H/O right wrist surgery  tendons repair - at age 14      AV fistula      H/O ventral hernia repair      S/P cholecystectomy

## 2025-03-09 NOTE — PROGRESS NOTE ADULT - SUBJECTIVE AND OBJECTIVE BOX
MR#[id number]  PATIENT NAME:RAAD CANO    DATE OF SERVICE: 03-09-25 @ 07:02  Patient was seen and examined by Solo Quick MD on    03-09-25 @ 07:02 .  Interim events noted.Consultant notes ,Labs,Telemetry reviewed by me       HOSPITAL COURSE: HPI:  54M with PMH of HTN, HLD, ESRD on HD MWF via LUE AVF, ascites (requiring repeat paracenteses q4-6wks), NICM with moderate LV dysfunction w/ EF 35-40%(2023) and CAD s/p atherectomy + SALLIE to D1(4/11/2024) presents to [facility] transferred from [facility].  Cardiac cath which showed pLAD 70% ISR, mLCx 70%, D1 severe dz.   Patient now transferred to [facility] CTS Dr. Rivers for CABG eval.     (24 Dec 2024 05:07)      INTERIM EVENTS:Patient seen at bedside ,interim events noted.  12/23 Cardiac cath  pLAD 70% ISR, mLCx 70%, D1 severe dz.   12/31-POD#1-C3L free LIMA-LAD; SVG-Diag; SVG-leftPL Awake OOB extubated Sinus rhythm on Dobutamine gtt  03/04-Awake Had FEES still NPO Atrial Flutter   03/06-Tx to SDU  03/07-Awake Atrial Flutter   03/08-Awake VSS Atrial Flutter still on NGT  03/09-Awake Atrial Flutter on TC 30% still has poor cough      PMH -reviewed admission note, no change since admission        MEDICATIONS  (STANDING):  apixaban 2.5 milliGRAM(s) Oral two times a day  ascorbic acid 500 milliGRAM(s) Oral daily  aspirin  chewable 81 milliGRAM(s) Oral daily  atorvastatin 80 milliGRAM(s) Oral at bedtime  buDESOnide    Inhalation Suspension 0.5 milliGRAM(s) Inhalation two times a day  chlorhexidine 2% Cloths 1 Application(s) Topical daily  chlorhexidine 4% Liquid 1 Application(s) Topical <User Schedule>  dextrose 50% Injectable 50 milliLiter(s) IV Push every 15 minutes  epoetin ignacia (EPOGEN) Injectable 10199 Unit(s) IV Push <User Schedule>  ferrous    sulfate Liquid 300 milliGRAM(s) Enteral Tube daily  insulin lispro (ADMELOG) corrective regimen sliding scale   SubCutaneous every 6 hours  melatonin 5 milliGRAM(s) Oral at bedtime  methocarbamol 500 milliGRAM(s) Oral every 8 hours  metoprolol tartrate 25 milliGRAM(s) Oral two times a day  mirtazapine 7.5 milliGRAM(s) Oral at bedtime  Nephro-elicia 1 Tablet(s) Oral daily  pantoprazole  Injectable 40 milliGRAM(s) IV Push daily  sodium chloride 0.65% Nasal 1 Spray(s) Both Nostrils every 12 hours  sodium chloride 0.9% for Nebulization 3 milliLiter(s) Nebulizer every 6 hours  sodium chloride 0.9%. 1000 milliLiter(s) (10 mL/Hr) IV Continuous <Continuous>  sodium zirconium cyclosilicate 10 Gram(s) Oral <User Schedule>  traZODone 100 milliGRAM(s) Oral at bedtime    MEDICATIONS  (PRN):  acetaminophen     Tablet .. 650 milliGRAM(s) Oral every 6 hours PRN Mild Pain (1 - 3)  lidocaine/prilocaine Cream 1 Application(s) Topical daily PRN pre-HD  sodium chloride 0.9% lock flush 10 milliLiter(s) IV Push every 1 hour PRN Pre/post blood products, medications, blood draw, and to maintain line patency            REVIEW OF SYSTEMS:  Constitutional: [ ] fever, [ ]weight loss,  [ ]fatigue [ ]weight gain  Eyes: [ ] visual changes  Respiratory: [ ]shortness of breath;  [ ] cough, [ ]wheezing, [ ]chills, [ ]hemoptysis  Cardiovascular: [ ] chest pain, [ ]palpitations, [ ]dizziness,  [ ]leg swelling[ ]orthopnea[ ]PND  Gastrointestinal: [ ] abdominal pain, [ ]nausea, [ ]vomiting,  [ ]diarrhea [ ]Constipation [ ]Melena  Genitourinary: [ ] dysuria, [ ] hematuria [ ]Vigil  Neurologic: [ ] headaches [ ] tremors[ ]weakness [ ]Paralysis Right[ ] Left[ ]  Skin: [ ] itching, [ ]burning, [ ] rashes  Endocrine: [ ] heat or cold intolerance  Musculoskeletal: [ ] joint pain or swelling; [ ] muscle, back, or extremity pain  Psychiatric: [ ] depression, [ ]anxiety, [ ]mood swings, or [ ]difficulty sleeping  Hematologic: [ ] easy bruising, [ ] bleeding gums    [ ] All remaining systems negative except as per above.   [ ]Unable to obtain.  [x] No change in ROS since admission      Vital Signs Last 24 Hrs  T(C): 36.7 (09 Mar 2025 03:54), Max: 36.8 (08 Mar 2025 15:10)  T(F): 98.1 (09 Mar 2025 03:54), Max: 98.3 (08 Mar 2025 23:12)  HR: 64 (09 Mar 2025 05:50) (61 - 80)  BP: 137/56 (08 Mar 2025 23:12) (123/58 - 141/64)  BP(mean): 81 (08 Mar 2025 23:12) (77 - 96)  RR: 18 (09 Mar 2025 03:54) (18 - 20)  SpO2: 96% (09 Mar 2025 05:50) (94% - 100%)    Parameters below as of 09 Mar 2025 05:50  Patient On (Oxygen Delivery Method): tracheostomy collar    O2 Concentration (%): 30  I&O's Summary    08 Mar 2025 06:01  -  09 Mar 2025 07:00  --------------------------------------------------------  IN: 740 mL / OUT: 1 mL / NET: 739 mL        PHYSICAL EXAM:  General: No acute distress BMI-28  HEENT: EOMI, PERRL  Neck: Supple, [ ] JVD  Lungs: Equal air entry bilaterally; [ ] rales [ ] wheezing [ ] rhonchi  Heart: Regular rate and rhythm; [x ] murmur   2/6 [ x] systolic [ ] diastolic [ ] radiation[ ] rubs [ ]  gallops  Abdomen: Nontender, bowel sounds present  Extremities: No clubbing, cyanosis, [ ] edema [ ]Pulses  equal and intact  Nervous system:  Alert & Oriented X3, no focal deficits  Psychiatric: Normal affect  Skin: No rashes or lesions    LABS:  03-07    135  |  93[L]  |  29[H]  ----------------------------<  108[H]  4.1   |  27  |  5.31[H]    Ca    9.2      07 Mar 2025 12:48  Phos  4.9     03-07  Mg     2.5     03-07    TPro  6.6  /  Alb  3.0[L]  /  TBili  0.4  /  DBili  x   /  AST  20  /  ALT  10  /  AlkPhos  93  03-07    Creatinine Trend: 5.31<--, 4.92<--, 3.99<--, 5.47<--, 4.89<--, 4.32<--                        8.7    10.06 )-----------( 232      ( 07 Mar 2025 12:48 )             27.5          Xray Chest 1 View- PORTABLE-Urgent (Xray Chest 1 View- PORTABLE-Urgent .) (03.08.25 @ 13:49) >  FINDINGS:  Enteric tube courses over the diaphragm, the tip is not visualized.  The heart is enlarged.  Small left pleural effusion with adjacent atelectasis.  Interval improvement in pulmonary edema changes.  There is no pneumothorax.  No acute abnormalities in the visualized osseous structures.    IMPRESSION:  Stable small left pleural effusion.  Interval improvement in pulmonary vascular changes.

## 2025-03-09 NOTE — PROGRESS NOTE ADULT - ASSESSMENT
54M with PMH of HTN, HLD, ESRD on HD MWF via LUE AVF, ascites (requiring repeat paracenteses q4-6wks), NICM with moderate LV dysfunction w/ EF 35-40%() and CAD s/p atherectomy + SALLIE to D1(2024) presents to [facility] transferred from [facility].  Cardiac cath which showed pLAD 70% ISR, mLCx 70%, D1 severe dz.   Patient now transferred to [facility] CTS Dr. Rivers for CABG eval      # CAD s/p NSTEMI  Hx CAD s/p PCI LAD   Presented with ADHF elevated troponins  Positive NST1-Cath- pLAD 70% ISR, mLCx 70%, D1 severe dz.   TTE EF 35% Severe TRWas on Plavix-p2y12- 321 been off Plavix since -POD#1-C3L free LIMA-LAD; SVG-Diag; ZZG-qntoKT-Zwwbjlpgf on  Sinus rhythm,Amio for AF prophylaxis   Atrial Flutter on TC during day Ambulating  -Increased congestion on CXR had HD yesterday remains in AFl   -Atrial Flutter HFTC 40L/30% BP stable     -Atrial Flutter tolerating TC for HD MWF-consider DOAC   OOB Abd US moderate 4 quad ascites noted remains in Atrial Flutter~~70's   TC Atrial Flutter  -s/p Bronch/BAL remains in AFl  Trach downsized 7   -Tolerating TC AFl rate controlled On Heparin gtt transition to DOAC  -Tx to SDU on Eliquis Atrial Flutter  -VSS Atrial Flutter rate controlled on Eliquis for AC  -Atrial Flutter ENT follow up Taper O2 requirement on TC 30%    # Acute on Chronic Systolic Heart Failure now improved  EF-35% ,severe TR  Had HD post op  GDMT post op  LVEF improved post bypass   -TTE EF 40%,trace effusion  ECG c/w pericarditis-was on colchicine   01/15-TTE EF 55%  -TTE EF 60%   -Normal LV systolic function Moderate pericardial effusion  -On TC-HF and Vent support at nights   02/10-Vent HS with T Collar trial Ambulated Remains in AF  Repeat TTE Normal LV Systolic function Small Pericardial effusion decreased from 2/3        Vascular evaluated  AVGraft /?steal causing RV failure-'' Very low suspicion of steal Sd and AVF causing current clinical state. ''   VA Duplex Hemodialysis Access, Left (25 @ 13:35) >   Arterial flow volume of 1301 mL/m, and the venous flow volume of  1492 mL/m are consistent with a normally functioning dialysis access  fistula.      # Ascites  Hx chronic ascites  Has drainage q4-6 weeks  Last drained -4600mL  ? ascites related to severe TR  Had kjsllrnjrzka-Bmjazid-mj growth   CT Abdomen 01/10-Large volume ascites-  US abdomen--Small to moderate volume abdominal/pelvic ascites  US abdomen  Moderate ascites  US Abdomen -Moderate 4 quad ascites  Repeat US Abdomen tomorrow    # ESRD  Now on  HD-MWF

## 2025-03-09 NOTE — PROGRESS NOTE ADULT - SUBJECTIVE AND OBJECTIVE BOX
Dr. Yousif  Office [phone] (9 am to 5 pm)  Service: [phone] (5pm to 9am)  Chanell RENTERIA      RENAL PROGRESS NOTE: DATE OF SERVICE 03-09-25 @ 16:19    Patient is a 54y old  Male who presents with a chief complaint of CAD (09 Mar 2025 10:26)      Patient seen and examined at bedside. No chest pain/sob    VITALS:  T(F): 98.1 (03-09-25 @ 15:24), Max: 98.3 (03-08-25 @ 23:12)  HR: 61 (03-09-25 @ 15:24)  BP: 125/57 (03-09-25 @ 15:24)  RR: 19 (03-09-25 @ 15:24)  SpO2: 100% (03-09-25 @ 15:24)  Wt(kg): --    03-08 @ 06:01  -  03-09 @ 07:00  --------------------------------------------------------  IN: 740 mL / OUT: 1 mL / NET: 739 mL          PHYSICAL EXAM:  Constitutional: NAD  Neck: No JVD  Respiratory: CTAB, no wheezes, rales or rhonchi  Cardiovascular: S1, S2, RRR  Gastrointestinal: BS+, soft, NT/ND  Extremities: No peripheral edema    Hospital Medications:   MEDICATIONS  (STANDING):  apixaban 2.5 milliGRAM(s) Oral two times a day  ascorbic acid 500 milliGRAM(s) Oral daily  aspirin  chewable 81 milliGRAM(s) Oral daily  atorvastatin 80 milliGRAM(s) Oral at bedtime  buDESOnide    Inhalation Suspension 0.5 milliGRAM(s) Inhalation two times a day  chlorhexidine 2% Cloths 1 Application(s) Topical daily  chlorhexidine 4% Liquid 1 Application(s) Topical <User Schedule>  dextrose 50% Injectable 50 milliLiter(s) IV Push every 15 minutes  epoetin ignacia (EPOGEN) Injectable 12850 Unit(s) IV Push <User Schedule>  ferrous    sulfate Liquid 300 milliGRAM(s) Enteral Tube daily  insulin lispro (ADMELOG) corrective regimen sliding scale   SubCutaneous every 6 hours  melatonin 5 milliGRAM(s) Oral at bedtime  methocarbamol 500 milliGRAM(s) Oral every 8 hours  metoprolol tartrate 25 milliGRAM(s) Oral two times a day  mirtazapine 7.5 milliGRAM(s) Oral at bedtime  Nephro-elicia 1 Tablet(s) Oral daily  pantoprazole  Injectable 40 milliGRAM(s) IV Push daily  sodium chloride 0.65% Nasal 1 Spray(s) Both Nostrils every 12 hours  sodium chloride 0.9% for Nebulization 3 milliLiter(s) Nebulizer every 6 hours  sodium chloride 0.9%. 1000 milliLiter(s) (10 mL/Hr) IV Continuous <Continuous>  sodium zirconium cyclosilicate 10 Gram(s) Oral <User Schedule>  traZODone 100 milliGRAM(s) Oral at bedtime      LABS:        Creatinine Trend: 5.31 <--, 4.92 <--, 3.99 <--, 5.47 <--            Urine Studies:  Urinalysis - [03-07-25 @ 12:48]      Color  / Appearance  / SG  / pH       Gluc 108 / Ketone   / Bili  / Urobili        Blood  / Protein  / Leuk Est  / Nitrite       RBC  / WBC  / Hyaline  / Gran  / Sq Epi  / Non Sq Epi  / Bacteria       Iron 29, TIBC 143, %sat 20      [12-19-24 @ 05:00]  Ferritin 904      [12-19-24 @ 05:00]  TSH 4.75      [12-24-24 @ 06:50]        RADIOLOGY & ADDITIONAL STUDIES:

## 2025-03-10 DIAGNOSIS — Z93.0 TRACHEOSTOMY STATUS: ICD-10-CM

## 2025-03-10 LAB
ALBUMIN SERPL ELPH-MCNC: 2.8 G/DL — LOW (ref 3.3–5)
ALP SERPL-CCNC: 94 U/L — SIGNIFICANT CHANGE UP (ref 40–120)
ALT FLD-CCNC: 11 U/L — SIGNIFICANT CHANGE UP (ref 10–45)
ANION GAP SERPL CALC-SCNC: 19 MMOL/L — HIGH (ref 5–17)
AST SERPL-CCNC: 21 U/L — SIGNIFICANT CHANGE UP (ref 10–40)
BILIRUB SERPL-MCNC: 0.4 MG/DL — SIGNIFICANT CHANGE UP (ref 0.2–1.2)
BUN SERPL-MCNC: 34 MG/DL — HIGH (ref 7–23)
CHLORIDE SERPL-SCNC: 95 MMOL/L — LOW (ref 96–108)
CO2 SERPL-SCNC: 24 MMOL/L — SIGNIFICANT CHANGE UP (ref 22–31)
CREAT SERPL-MCNC: 6.17 MG/DL — HIGH (ref 0.5–1.3)
EGFR: 10 ML/MIN/1.73M2 — LOW
EGFR: 10 ML/MIN/1.73M2 — LOW
GLUCOSE SERPL-MCNC: 99 MG/DL — SIGNIFICANT CHANGE UP (ref 70–99)
HCT VFR BLD CALC: 30.7 % — LOW (ref 39–50)
HGB BLD-MCNC: 9.8 G/DL — LOW (ref 13–17)
MAGNESIUM SERPL-MCNC: 2.7 MG/DL — HIGH (ref 1.6–2.6)
MCHC RBC-ENTMCNC: 31.3 PG — SIGNIFICANT CHANGE UP (ref 27–34)
MCHC RBC-ENTMCNC: 31.9 G/DL — LOW (ref 32–36)
MCV RBC AUTO: 98.1 FL — SIGNIFICANT CHANGE UP (ref 80–100)
NRBC BLD AUTO-RTO: 0 /100 WBCS — SIGNIFICANT CHANGE UP (ref 0–0)
PHOSPHATE SERPL-MCNC: 5.8 MG/DL — HIGH (ref 2.5–4.5)
PLATELET # BLD AUTO: 251 K/UL — SIGNIFICANT CHANGE UP (ref 150–400)
POTASSIUM SERPL-MCNC: 3.9 MMOL/L — SIGNIFICANT CHANGE UP (ref 3.5–5.3)
POTASSIUM SERPL-SCNC: 3.9 MMOL/L — SIGNIFICANT CHANGE UP (ref 3.5–5.3)
PROT SERPL-MCNC: 6.4 G/DL — SIGNIFICANT CHANGE UP (ref 6–8.3)
RBC # BLD: 3.13 M/UL — LOW (ref 4.2–5.8)
RBC # FLD: 19.7 % — HIGH (ref 10.3–14.5)
SODIUM SERPL-SCNC: 138 MMOL/L — SIGNIFICANT CHANGE UP (ref 135–145)
WBC # BLD: 7.39 K/UL — SIGNIFICANT CHANGE UP (ref 3.8–10.5)
WBC # FLD AUTO: 7.39 K/UL — SIGNIFICANT CHANGE UP (ref 3.8–10.5)

## 2025-03-10 PROCEDURE — 31615 TRCHEOBRNCHSC EST TRACHS INC: CPT

## 2025-03-10 PROCEDURE — 71045 X-RAY EXAM CHEST 1 VIEW: CPT | Mod: 26

## 2025-03-10 PROCEDURE — 99223 1ST HOSP IP/OBS HIGH 75: CPT | Mod: FS,25

## 2025-03-10 PROCEDURE — 76705 ECHO EXAM OF ABDOMEN: CPT | Mod: 26

## 2025-03-10 RX ADMIN — Medication 0.5 MILLIGRAM(S): at 22:11

## 2025-03-10 RX ADMIN — Medication 1 APPLICATION(S): at 09:38

## 2025-03-10 RX ADMIN — Medication 3 MILLILITER(S): at 11:07

## 2025-03-10 RX ADMIN — Medication 0.5 MILLIGRAM(S): at 11:10

## 2025-03-10 RX ADMIN — Medication 0.5 MILLIGRAM(S): at 11:40

## 2025-03-10 RX ADMIN — METHOCARBAMOL 500 MILLIGRAM(S): 500 TABLET, FILM COATED ORAL at 05:51

## 2025-03-10 RX ADMIN — APIXABAN 2.5 MILLIGRAM(S): 2.5 TABLET, FILM COATED ORAL at 05:57

## 2025-03-10 RX ADMIN — METHOCARBAMOL 500 MILLIGRAM(S): 500 TABLET, FILM COATED ORAL at 22:13

## 2025-03-10 RX ADMIN — Medication 0.5 MILLIGRAM(S): at 22:41

## 2025-03-10 RX ADMIN — Medication 1 SPRAY(S): at 09:38

## 2025-03-10 RX ADMIN — EPOETIN ALFA 10000 UNIT(S): 10000 SOLUTION INTRAVENOUS; SUBCUTANEOUS at 12:05

## 2025-03-10 RX ADMIN — ATORVASTATIN CALCIUM 80 MILLIGRAM(S): 80 TABLET, FILM COATED ORAL at 22:13

## 2025-03-10 RX ADMIN — Medication 40 MILLIGRAM(S): at 11:08

## 2025-03-10 RX ADMIN — Medication 3 MILLILITER(S): at 18:13

## 2025-03-10 RX ADMIN — METOPROLOL SUCCINATE 25 MILLIGRAM(S): 50 TABLET, EXTENDED RELEASE ORAL at 22:13

## 2025-03-10 RX ADMIN — LIDOCAINE AND PRILOCAINE 1 APPLICATION(S): 25; 25 CREAM TOPICAL at 11:08

## 2025-03-10 RX ADMIN — Medication 500 MILLIGRAM(S): at 18:14

## 2025-03-10 RX ADMIN — Medication 1 TABLET(S): at 18:12

## 2025-03-10 RX ADMIN — Medication 300 MILLIGRAM(S): at 18:11

## 2025-03-10 RX ADMIN — Medication 81 MILLIGRAM(S): at 18:12

## 2025-03-10 RX ADMIN — Medication 3 MILLILITER(S): at 00:00

## 2025-03-10 RX ADMIN — MIRTAZAPINE 7.5 MILLIGRAM(S): 30 TABLET, FILM COATED ORAL at 22:13

## 2025-03-10 RX ADMIN — BUDESONIDE 0.5 MILLIGRAM(S): 0.25 SUSPENSION RESPIRATORY (INHALATION) at 18:12

## 2025-03-10 RX ADMIN — APIXABAN 2.5 MILLIGRAM(S): 2.5 TABLET, FILM COATED ORAL at 18:12

## 2025-03-10 RX ADMIN — METOPROLOL SUCCINATE 25 MILLIGRAM(S): 50 TABLET, EXTENDED RELEASE ORAL at 09:39

## 2025-03-10 NOTE — PROGRESS NOTE ADULT - SUBJECTIVE AND OBJECTIVE BOX
[facility]    Dr. Nica Styles     Office [phone] (9 am to 5 pm)  Service: [phone] (5pm to 9am)  Also Available on TEAMS      RENAL PROGRESS NOTE: DATE OF SERVICE 03-10-25 @ 11:04    Patient is a 54y old  Male who presents with a chief complaint of CAD (10 Mar 2025 08:40)      Patient seen and examined at bedside. No chest pain/sob    VITALS:  T(F): 97.9 (03-10-25 @ 09:43), Max: 98.2 (03-09-25 @ 23:11)  HR: 64 (03-10-25 @ 09:43)  BP: 134/63 (03-10-25 @ 09:43)  RR: 18 (03-10-25 @ 09:43)  SpO2: 100% (03-10-25 @ 09:43)  Wt(kg): --    03-09 @ 07:01  -  03-10 @ 07:00  --------------------------------------------------------  IN: 430 mL / OUT: 0 mL / NET: 430 mL          PHYSICAL EXAM:  Constitutional: NAD  Neck: No JVD  Respiratory: CTAB, no wheezes, rales or rhonchi  Cardiovascular: S1, S2, RRR  Gastrointestinal: BS+, soft, NT/ND  Extremities: No peripheral edema    Hospital Medications:   MEDICATIONS  (STANDING):  apixaban 2.5 milliGRAM(s) Oral two times a day  ascorbic acid 500 milliGRAM(s) Oral daily  aspirin  chewable 81 milliGRAM(s) Oral daily  atorvastatin 80 milliGRAM(s) Oral at bedtime  buDESOnide    Inhalation Suspension 0.5 milliGRAM(s) Inhalation two times a day  chlorhexidine 2% Cloths 1 Application(s) Topical daily  chlorhexidine 4% Liquid 1 Application(s) Topical <User Schedule>  dextrose 50% Injectable 50 milliLiter(s) IV Push every 15 minutes  epoetin ignacia (EPOGEN) Injectable 18392 Unit(s) IV Push <User Schedule>  ferrous    sulfate Liquid 300 milliGRAM(s) Enteral Tube daily  insulin lispro (ADMELOG) corrective regimen sliding scale   SubCutaneous every 6 hours  melatonin 5 milliGRAM(s) Oral at bedtime  methocarbamol 500 milliGRAM(s) Oral every 8 hours  metoprolol tartrate 25 milliGRAM(s) Oral two times a day  mirtazapine 7.5 milliGRAM(s) Oral at bedtime  Nephro-elicia 1 Tablet(s) Oral daily  pantoprazole  Injectable 40 milliGRAM(s) IV Push daily  sodium chloride 0.65% Nasal 1 Spray(s) Both Nostrils every 12 hours  sodium chloride 0.9% for Nebulization 3 milliLiter(s) Nebulizer every 6 hours  sodium chloride 0.9%. 1000 milliLiter(s) (10 mL/Hr) IV Continuous <Continuous>  sodium zirconium cyclosilicate 10 Gram(s) Oral <User Schedule>  traZODone 100 milliGRAM(s) Oral at bedtime      LABS:        Creatinine Trend: 5.31 <--, 4.92 <--, 3.99 <--            Urine Studies:  Urinalysis - [03-07-25 @ 12:48]      Color  / Appearance  / SG  / pH       Gluc 108 / Ketone   / Bili  / Urobili        Blood  / Protein  / Leuk Est  / Nitrite       RBC  / WBC  / Hyaline  / Gran  / Sq Epi  / Non Sq Epi  / Bacteria       Iron 29, TIBC 143, %sat 20      [12-19-24 @ 05:00]  Ferritin 904      [12-19-24 @ 05:00]  TSH 4.75      [12-24-24 @ 06:50]        RADIOLOGY & ADDITIONAL STUDIES:

## 2025-03-10 NOTE — CONSULT NOTE ADULT - SUBJECTIVE AND OBJECTIVE BOX
CC: trach eval     HPI: 54M with PMH of HTN, HLD, ESRD on HD MWF via LUE AVF, ascites (requiring repeat paracenteses q4-6wks), NICM with moderate LV dysfunction w/ EF 35-40%(2023) and CAD s/p atherectomy + SALLIE to D1(4/11/2024) presents to [facility] transferred from [facility]. Now s/p CABG x3, followed by complicated hospital course. S/p tracheostomy on 1/18, downsized to #7 uncuffed portex on 2/27. Has been tolerating trach collar. ENT called to evaluate for possible decannulation. SLP performed FEES on 3/3 showing aspiration and poor vocal cord motion. Pt denies odynophagia, dysphonia, sob, dyspnea, changes in voice or inability to tolerate secretions.       PAST MEDICAL & SURGICAL HISTORY:  Type 2 diabetes mellitus      Hypertension      End stage renal disease  started HD 2/2019 T, Th, Sat via right chest permacath      Bone spur  right shoulder- hx of - sx done      Anemia      Injury of right wrist  hx of at age 14      History of hyperkalemia  before HD- K-6.2 - to repeat in am of sx, pt had HD today      S/P arthroscopy of right shoulder  2010      History of vascular access device  right chest permacath - 2/2019      H/O right wrist surgery  tendons repair - at age 14      AV fistula      H/O ventral hernia repair      S/P cholecystectomy        Allergies    No Known Allergies    Intolerances      MEDICATIONS  (STANDING):  apixaban 2.5 milliGRAM(s) Oral two times a day  ascorbic acid 500 milliGRAM(s) Oral daily  aspirin  chewable 81 milliGRAM(s) Oral daily  atorvastatin 80 milliGRAM(s) Oral at bedtime  buDESOnide    Inhalation Suspension 0.5 milliGRAM(s) Inhalation two times a day  chlorhexidine 2% Cloths 1 Application(s) Topical daily  chlorhexidine 4% Liquid 1 Application(s) Topical <User Schedule>  dextrose 50% Injectable 50 milliLiter(s) IV Push every 15 minutes  epoetin ignacia (EPOGEN) Injectable 32692 Unit(s) IV Push <User Schedule>  ferrous    sulfate Liquid 300 milliGRAM(s) Enteral Tube daily  insulin lispro (ADMELOG) corrective regimen sliding scale   SubCutaneous every 6 hours  melatonin 5 milliGRAM(s) Oral at bedtime  methocarbamol 500 milliGRAM(s) Oral every 8 hours  metoprolol tartrate 25 milliGRAM(s) Oral two times a day  mirtazapine 7.5 milliGRAM(s) Oral at bedtime  Nephro-elicia 1 Tablet(s) Oral daily  pantoprazole  Injectable 40 milliGRAM(s) IV Push daily  sodium chloride 0.65% Nasal 1 Spray(s) Both Nostrils every 12 hours  sodium chloride 0.9% for Nebulization 3 milliLiter(s) Nebulizer every 6 hours  sodium chloride 0.9%. 1000 milliLiter(s) (10 mL/Hr) IV Continuous <Continuous>  sodium zirconium cyclosilicate 10 Gram(s) Oral <User Schedule>  traZODone 100 milliGRAM(s) Oral at bedtime    MEDICATIONS  (PRN):  acetaminophen     Tablet .. 650 milliGRAM(s) Oral every 6 hours PRN Mild Pain (1 - 3)  HYDROmorphone  Injectable 0.5 milliGRAM(s) IV Push every 6 hours PRN Severe Pain (7 - 10)  lidocaine/prilocaine Cream 1 Application(s) Topical daily PRN pre-HD  sodium chloride 0.9% lock flush 10 milliLiter(s) IV Push every 1 hour PRN Pre/post blood products, medications, blood draw, and to maintain line patency        FAMILY HISTORY:  FH: hypertension, mother, father- alive  FH: type 2 diabetes, mother, father- alive.     Social History:  · Substance use	Yes  · Alcohol use	Occasional, Stopped drinking 10-12 years ago  · Substance use	Denies  · Tobacco use	Former smoker quit 10-12 year ago, smoked about 7 cigarettes per day  · Social History (marital status, living situation, occupation, and sexual history)	Chemo/Rad to chest: Denies   Implantable Devices: Denies  ADLs: Independent   Residence: Lives with family, retired, drove taxi and owed a business      ROS:   ENT: all negative except as noted in HPI   CV: denies palpitations  Pulm: denies SOB, cough, hemoptysis  GI: denies change in appetite, indigestion, n/v  : denies pertinent urinary symptoms, urgency  Neuro: denies numbness/tingling, loss of sensation  Psych: denies anxiety  MS: denies muscle weakness, instability  Heme: denies easy bruising or bleeding  Endo: denies heat/cold intolerance, excessive sweating  Vascular: denies LE edema    Vital Signs Last 24 Hrs  T(C): 36.7 (10 Mar 2025 11:44), Max: 36.8 (09 Mar 2025 23:11)  T(F): 98 (10 Mar 2025 11:44), Max: 98.2 (09 Mar 2025 23:11)  HR: 60 (10 Mar 2025 11:44) (60 - 81)  BP: 120/57 (10 Mar 2025 11:44) (117/49 - 141/60)  BP(mean): 67 (10 Mar 2025 11:44) (67 - 90)  RR: 18 (10 Mar 2025 11:44) (18 - 20)  SpO2: 98% (10 Mar 2025 11:44) (98% - 100%)    Parameters below as of 10 Mar 2025 11:44  Patient On (Oxygen Delivery Method): tracheostomy collar                              9.8    7.39  )-----------( 251      ( 10 Mar 2025 12:41 )             30.7    03-10    138  |  95[L]  |  34[H]  ----------------------------<  99  3.9   |  24  |  6.17[H]    Ca    8.9      10 Mar 2025 12:41  Phos  5.8     03-10  Mg     2.7     03-10    TPro  6.4  /  Alb  2.8[L]  /  TBili  0.4  /  DBili  x   /  AST  21  /  ALT  11  /  AlkPhos  94  03-10       PHYSICAL EXAM:  Gen: NAD  Skin: No rashes, bruises, or lesions  Head: Normocephalic, Atraumatic  Face: no edema, erythema, or fluctuance. Parotid glands soft without mass  Eyes: no scleral injection  Nose: Nares bilaterally patent, no discharge  Mouth: No Stridor / Drooling / Trismus.  Mucosa moist, tongue/uvula midline, oropharynx clear  Neck: #7 uncuffed portex tracheostomy tube in place, secured with velcro tie. Flat, supple, no lymphadenopathy, trachea midline, no masses  Lymphatic: No lymphadenopathy  Resp: breathing easily, no stridor  CV: no peripheral edema/cyanosis  GI: nondistended   Peripheral vascular: no JVD or edema  Neuro: facial nerve intact, no facial droop      Fiberoptic Flexible laryngoscopy:  (Scope #2 used)  Reason for Flexible Laryngoscopy: airway eval     Patient was unable to cooperate with mirror.  +aspiration of secretions, limited vocal cord abduction b/l.   Nasopharynx, oropharynx, and hypopharynx clear, no bleeding. Tongue base, posterior pharyngeal wall, vallecula, epiglottis, and subglottis appear normal. No erythema, edema, masses or lesions. Airway patent, no foreign body visualized. No glottic/supraglottic edema. True vocal cords, arytenoids, vestibular folds, ventricles, pyriform sinuses, and aryepiglottic folds appear normal bilaterally. Vocal cords mobile with good contact b/l.      Tracheoscopy:   Risks and benefits discussed with pt. Then, pt was placed in a supine position with neck extended. #7 portex uncuffed trach tube was removed. Bedside tracheoscopy performed, ema visualized, no purulence, no erythema, no evidence of tracheomalacia. Retroflexed scope to visualize subglottis which appears normal. Pt tolerated the procedure well without complications.

## 2025-03-10 NOTE — PROGRESS NOTE ADULT - ASSESSMENT
53 yo M with multiple medical problems including CAD s/p PCI in 2024 s/p CABG x 3 on 24    1/: Intubated for hypoxia & left lung white out s/p awake bronch with removal of copious mucopurulent secretions  : s/p IABP insertion, sepsis/septic shock due to E coli   ESBL pneumonia, collapsed Left Lung, s/p multiple bronch    tracheostomy tube placement    VRE E. Faecium Bcx   +HSV PCR BAL   tissue cx from back abscess - Serratia/MRSA  - - intermittent bradycardia/junctional episodes with hypotension  2/3: off , off theophylline. iHD 1L UF  : bronch for Left lung collapse wound vac, 2decho w/ moderate pericardial effusion - similar as before, US abd: small - moderate ascites - monitor at this time.  Wound vac placed for sacral wounds  : iHD-1L UF  : bronch today off positive pressure   : concern for left food drop   2/10 : no acute issues - CXR shows improving left sided aeration, pt subjectively reports having difficulty while lying flat  : s/p Paracentesis 3.5 L removed, leukocytosis   : Ascites fluid PMN < 250 (less likely SBP)   sniff test yesterday showed paradoxical diaphragmatic motion    : mucus plugging but able to clear airway with aggressive pulmonary toileting.   : no vent requirement overnight, able to mobilize secretion with pulm toileting  hyperkalemia post HD - workup for possible recirculation underway   : On TC X 48 hours - ambulating well, no mucus plugging events  : HFTC x24hrs (40L 30%)  : iHD 1.5L UF  : iHD 2L  : trach downsized to 7, bronch  - cultures growing ESBL E Coli, sacral wound vac changed   3/6 Transfer to SDU, +PW isolated, +sacral wound vac in place, + Tube feeds running, next HD scheduled for tomorrow   3/7 VSS; Continue with current medication regimen.  Continue on TC 40% 02.  Plan for HD today.  Nocturnal feeding.  3/8  FEES 3/3 notable for poor secretion management evidenced unable to clear 2/2 weak cough B/l vocal cords appeared to be adducted at midline. ENT eval demonstrates  limited vocal cord abduction vs. questionable vocal cord paresis--no plan for ENT intervention at this time, No PMV . Local sacral wound care by PT.   3/9 HD tomorrow, US abd in am Tolerating pm feeds  3/10 US abd and HD today. CXR PA/LAT. Consider ENT to reassess VC paresis feasibility of decannulation   Disposition: Home PT / OT

## 2025-03-10 NOTE — CONSULT NOTE ADULT - PROBLEM SELECTOR RECOMMENDATION 9
- SLP eval, if no aspiration can consider PMV trials  - When aspiration is resolved and tolerating PMV, reconsult for decannulation eval   - Call with questions or concerns

## 2025-03-10 NOTE — PROVIDER CONTACT NOTE (OTHER) - BACKGROUND
Patient is s/p C3L from 12/30. Patient has trach collar 30% 6L. Patient NPO with NGT feedings from 1800-600 daily.

## 2025-03-10 NOTE — PROGRESS NOTE ADULT - ASSESSMENT
54M with PMH of HTN, HLD, ESRD on HD MWF via LUE AVF, ascites (requiring repeat paracenteses q4-6wks), NICM with moderate LV dysfunction w/ EF 35-40%(2023) and CAD s/p atherectomy + SALLIE to D1(4/11/2024) presents to [facility] transferred from [facility] for CABG eval  Nephro on board for HD needs.    ESRD on HD   Nephrologist Dr. Bailey  [facility] Schedule / Access:  LUE AVF  Center: [facility]  Pt s/p HD on 2/19 and 2/21.  s/p HD 2/24 uf 1.5L  S/p HD on 02/26 2 L UF and 02/28 2 L UF again   S/p HD on 03/03 and 03/5, 03/07  HD today target UF 2.5 L   Keep MAP>65  Renal Diet  HD consent in chart     Hyper/Hypokalemia  Pt intermittently Hyperkalemia  HD as scheduled  now improved  Low K diet.  C/W Lokelma 10gm on nonHD days.  Monitor K.    HTN  BP fluctuating; controlled.  UF w/ HD as BP permits.  Low sodium diet.  Management per ICU  Monitor BP     CKD MBD  Check PTH   Low PO4 diet.  Not on binder.  Monitor Ca , PO4 daily     Anemia  CRISTIANE with HD  Repeat iron studies.  Transfuse if hgb <7    CAD with multivessel disease  CTICU following  s/p CABG 12/30    Hypoxic respiratory failure   S/p tracheostomy 01/18 now on trach collar  Per surgery  S/p bronchoscopy, pulm following

## 2025-03-10 NOTE — CONSULT NOTE ADULT - NS ATTEND AMEND GEN_ALL_CORE FT
ENT consulted for airway evaluation for decannulation     54M with PMH of HTN, HLD, ESRD on HD MWF via LUE AVF, ascites (requiring repeat paracenteses q4-6wks), NICM with moderate LV dysfunction w/ EF 35-40%(2023) and CAD s/p atherectomy + SALLIE to D1(4/11/2024) presents to [facility] transferred from [facility]. Now s/p CABG x3, followed by complicated hospital course. S/p tracheostomy on 1/18, downsized to #7 uncuffed portex on 2/27. Has been tolerating trach collar.     SLP performed FEES on 3/3 showing aspiration and poor vocal cord motion.     Laryngoscopy shows aspiration of secretions and limited vocal cord abduction b/l.   #7 portex uncuffed trach tube was removed. Tracheoscopy shows no purulence, no erythema, no evidence of tracheomalacia. Retroflexed scope to visualize subglottis which appears normal. Pt tolerated the procedure well without complications.    Recommend:  Airway Evaluation  - SLP eval, if no aspiration can consider PMV trials  - When aspiration is resolved and tolerating PMV, reconsult for decannulation eval   - Call with questions or concerns
Transient vagal hypotension/bradycardia without pauses. Brief dip in heart rates to 40s-50s bpm. No indication for pacing. Theophylline started.
patient seen - trach/vent dependent  - ascites s/p paracentesis x 2 per notes - nov 4.6L of fluid.   now with questionable diaphragm dysfunction but patient unable to complete SNIFF test for evaluation of paralysis of diaphragm.   on most recent attempt on 2/7 normal respiration minimal movement is seen of the left hemidiaphragm and mild decrease in movement is seen of the right hemidiaphragm. Right hemidiaphragm moved better than left hemidiaphragm  and recommended to repeat when he could complete the exam. also recommend VQ scan for evaluation of function . if diaphragm on left side shows paradoxical movement - may consider plication but if only weakened may find no benefit to plication.   patient also noted to have severe restrictive disease with FEV 40% - may be contributing to his overall respiratory dysfunction.

## 2025-03-10 NOTE — PROGRESS NOTE ADULT - SUBJECTIVE AND OBJECTIVE BOX
MR#[id number]  PATIENT NAME:RAAD CANO    DATE OF SERVICE: 03-10-25 @ 06:52  Patient was seen and examined by Solo Quick MD on    03-10-25 @ 06:52 .  Interim events noted.Consultant notes ,Labs,Telemetry reviewed by me       HOSPITAL COURSE:  54M with PMH of HTN, HLD, ESRD on HD MWF via LUE AVF, ascites (requiring repeat paracenteses q4-6wks), NICM with moderate LV dysfunction w/ EF 35-40%(2023) and CAD s/p atherectomy + SALLIE to D1(4/11/2024) presents to [facility] transferred from [facility].  Cardiac cath which showed pLAD 70% ISR, mLCx 70%, D1 severe dz.   Patient now transferred to [facility] CTS Dr. Rivers for CABG eval.     (24 Dec 2024 05:07)      INTERIM EVENTS:Patient seen at bedside ,interim events noted.  12/23 Cardiac cath  pLAD 70% ISR, mLCx 70%, D1 severe dz.   12/31-POD#1-C3L free LIMA-LAD; SVG-Diag; SVG-leftPL Awake OOB extubated Sinus rhythm on Dobutamine gtt  03/04-Awake Had FEES still NPO Atrial Flutter   03/06-Tx to SDU  03/07-Awake Atrial Flutter   03/08-Awake VSS Atrial Flutter still on NGT  03/09-Awake Atrial Flutter on TC 30% still has poor cough  03/10-Awake lying in bed Atrial Flutter on TC 30% for US Abdomen today      PMH -reviewed admission note, no change since admission      MEDICATIONS  (STANDING):  apixaban 2.5 milliGRAM(s) Oral two times a day  ascorbic acid 500 milliGRAM(s) Oral daily  aspirin  chewable 81 milliGRAM(s) Oral daily  atorvastatin 80 milliGRAM(s) Oral at bedtime  buDESOnide    Inhalation Suspension 0.5 milliGRAM(s) Inhalation two times a day  chlorhexidine 2% Cloths 1 Application(s) Topical daily  chlorhexidine 4% Liquid 1 Application(s) Topical <User Schedule>  dextrose 50% Injectable 50 milliLiter(s) IV Push every 15 minutes  epoetin ignacia (EPOGEN) Injectable 21999 Unit(s) IV Push <User Schedule>  ferrous    sulfate Liquid 300 milliGRAM(s) Enteral Tube daily  insulin lispro (ADMELOG) corrective regimen sliding scale   SubCutaneous every 6 hours  melatonin 5 milliGRAM(s) Oral at bedtime  methocarbamol 500 milliGRAM(s) Oral every 8 hours  metoprolol tartrate 25 milliGRAM(s) Oral two times a day  mirtazapine 7.5 milliGRAM(s) Oral at bedtime  Nephro-elicia 1 Tablet(s) Oral daily  pantoprazole  Injectable 40 milliGRAM(s) IV Push daily  sodium chloride 0.65% Nasal 1 Spray(s) Both Nostrils every 12 hours  sodium chloride 0.9% for Nebulization 3 milliLiter(s) Nebulizer every 6 hours  sodium chloride 0.9%. 1000 milliLiter(s) (10 mL/Hr) IV Continuous <Continuous>  sodium zirconium cyclosilicate 10 Gram(s) Oral <User Schedule>  traZODone 100 milliGRAM(s) Oral at bedtime    MEDICATIONS  (PRN):  acetaminophen     Tablet .. 650 milliGRAM(s) Oral every 6 hours PRN Mild Pain (1 - 3)  HYDROmorphone  Injectable 0.5 milliGRAM(s) IV Push every 6 hours PRN Severe Pain (7 - 10)  lidocaine/prilocaine Cream 1 Application(s) Topical daily PRN pre-HD  sodium chloride 0.9% lock flush 10 milliLiter(s) IV Push every 1 hour PRN Pre/post blood products, medications, blood draw, and to maintain line patency            REVIEW OF SYSTEMS:  Constitutional: [ ] fever, [ ]weight loss,  [ ]fatigue [ ]weight gain  Eyes: [ ] visual changes  Respiratory: [ ]shortness of breath;  [ ] cough, [ ]wheezing, [ ]chills, [ ]hemoptysis  Cardiovascular: [ ] chest pain, [ ]palpitations, [ ]dizziness,  [ ]leg swelling[ ]orthopnea[ ]PND  Gastrointestinal: [ ] abdominal pain, [ ]nausea, [ ]vomiting,  [ ]diarrhea [ ]Constipation [ ]Melena  Genitourinary: [ ] dysuria, [ ] hematuria [ ]Vigil  Neurologic: [ ] headaches [ ] tremors[ ]weakness [ ]Paralysis Right[ ] Left[ ]  Skin: [ ] itching, [ ]burning, [ ] rashes  Endocrine: [ ] heat or cold intolerance  Musculoskeletal: [ ] joint pain or swelling; [ ] muscle, back, or extremity pain  Psychiatric: [ ] depression, [ ]anxiety, [ ]mood swings, or [ ]difficulty sleeping  Hematologic: [ ] easy bruising, [ ] bleeding gums    [ ] All remaining systems negative except as per above.   [ ]Unable to obtain.  [x] No change in ROS since admission      Vital Signs Last 24 Hrs  T(C): 36.8 (09 Mar 2025 23:11), Max: 36.8 (09 Mar 2025 23:11)  T(F): 98.2 (09 Mar 2025 23:11), Max: 98.2 (09 Mar 2025 23:11)  HR: 62 (10 Mar 2025 06:11) (61 - 81)  BP: 117/49 (10 Mar 2025 04:22) (111/47 - 141/60)  BP(mean): 71 (10 Mar 2025 04:22) (68 - 86)  RR: 18 (10 Mar 2025 04:22) (18 - 20)  SpO2: 98% (10 Mar 2025 06:11) (97% - 100%)    Parameters below as of 10 Mar 2025 06:11  Patient On (Oxygen Delivery Method): tracheostomy collar    O2 Concentration (%): 30  I&O's Summary    08 Mar 2025 06:01  -  09 Mar 2025 07:00  --------------------------------------------------------  IN: 740 mL / OUT: 1 mL / NET: 739 mL    09 Mar 2025 07:01  -  10 Mar 2025 06:52  --------------------------------------------------------  IN: 430 mL / OUT: 0 mL / NET: 430 mL        PHYSICAL EXAM:  General: No acute distress BMI-28  HEENT: EOMI, PERRL  Neck: Supple, [ ] JVD  Lungs: Equal air entry bilaterally; [ ] rales [ ] wheezing [ ] rhonchi  Heart: Regular rate and rhythm; [x ] murmur   2/6 [ x] systolic [ ] diastolic [ ] radiation[ ] rubs [ ]  gallops  Abdomen: Nontender, bowel sounds present  Extremities: No clubbing, cyanosis, [ ] edema [ ]Pulses  equal and intact  Nervous system:  Alert & Oriented X3, no focal deficits  Psychiatric: Normal affect  Skin: No rashes or lesions    LABS:        Creatinine Trend: 5.31<--, 4.92<--, 3.99<--, 5.47<--, 4.89<--, 4.32<--         Xray Chest 1 View- PORTABLE-Urgent (Xray Chest 1 View- PORTABLE-Urgent .) (03.08.25 @ 13:49) >  FINDINGS:  Enteric tube courses over the diaphragm, the tip is not visualized.  The heart is enlarged.  Small left pleural effusion with adjacent atelectasis.  Interval improvement in pulmonary edema changes.  There is no pneumothorax.  No acute abnormalities in the visualized osseous structures.    IMPRESSION:  Stable small left pleural effusion.  Interval improvement in pulmonary vascular changes.

## 2025-03-10 NOTE — PROGRESS NOTE ADULT - ASSESSMENT
54M with PMH of HTN, HLD, ESRD on HD MWF via LUE AVF, ascites (requiring repeat paracenteses q4-6wks), NICM with moderate LV dysfunction w/ EF 35-40%() and CAD s/p atherectomy + SALLIE to D1(2024) presents to [facility] transferred from [facility].  Cardiac cath which showed pLAD 70% ISR, mLCx 70%, D1 severe dz.   Patient now transferred to [facility] CTS Dr. Rivers for CABG eval      # CAD s/p NSTEMI  Hx CAD s/p PCI LAD   Presented with ADHF elevated troponins  Positive NST1-Cath- pLAD 70% ISR, mLCx 70%, D1 severe dz.   TTE EF 35% Severe TRWas on Plavix-p2y12- 321 been off Plavix since -POD#1-C3L free LIMA-LAD; SVG-Diag; YSS-dovrAH-Wmmtcbkze on  Sinus rhythm,Amio for AF prophylaxis   Atrial Flutter on TC during day Ambulating  -Increased congestion on CXR had HD yesterday remains in AFl   -Atrial Flutter HFTC 40L/30% BP stable     -Atrial Flutter tolerating TC for HD MWF-consider DOAC   OOB Abd US moderate 4 quad ascites noted remains in Atrial Flutter~~70's   TC Atrial Flutter  -s/p Bronch/BAL remains in AFl  Trach downsized 7   -Tolerating TC AFl rate controlled On Heparin gtt transition to DOAC  -Tx to SDU on Eliquis Atrial Flutter  -VSS Atrial Flutter rate controlled on Eliquis for AC  -Atrial Flutter ENT follow up Taper O2 requirement on TC 30%    03/10-Awake VSS TC 30% AFl rate controlled    # Acute on Chronic Systolic Heart Failure now improved  EF-35% ,severe TR  Had HD post op  GDMT post op  LVEF improved post bypass   -TTE EF 40%,trace effusion  ECG c/w pericarditis-was on colchicine   01/15-TTE EF 55%  -TTE EF 60%   -Normal LV systolic function Moderate pericardial effusion  -On TC-HF and Vent support at nights   02/10-Vent HS with T Collar trial Ambulated Remains in AF  Repeat TTE Normal LV Systolic function Small Pericardial effusion decreased from 2/3        Vascular evaluated  AVGraft /?steal causing RV failure-'' Very low suspicion of steal Sd and AVF causing current clinical state. ''   VA Duplex Hemodialysis Access, Left (25 @ 13:35) >   Arterial flow volume of 1301 mL/m, and the venous flow volume of  1492 mL/m are consistent with a normally functioning dialysis access  fistula.      # Ascites  Hx chronic ascites  Has drainage q4-6 weeks  Last drained -4600mL  ? ascites related to severe TR  Had mxdivjbgwhyr-Oqdqtlq-be growth   CT Abdomen 01/10-Large volume ascites-  US abdomen--Small to moderate volume abdominal/pelvic ascites  US abdomen  Moderate ascites  US Abdomen -Moderate 4 quad ascites  Repeat US Abdomen today        # ESRD  Now on  HD-MWF

## 2025-03-10 NOTE — PROVIDER CONTACT NOTE (OTHER) - ACTION/TREATMENT ORDERED:
I reviewed the H&P, I examined the patient, and there are no changes in the patient's condition.     No interventions at this point as per Sheryl Holder NP.

## 2025-03-10 NOTE — PROGRESS NOTE ADULT - SUBJECTIVE AND OBJECTIVE BOX
VITAL SIGNS    Telemetry:  alan 60-80  Vital Signs Last 24 Hrs  T(C): 36.8 (03-09-25 @ 23:11), Max: 36.8 (03-09-25 @ 23:11)  T(F): 98.2 (03-09-25 @ 23:11), Max: 98.2 (03-09-25 @ 23:11)  HR: 62 (03-10-25 @ 06:11) (61 - 81)  BP: 117/49 (03-10-25 @ 04:22) (111/47 - 141/60)  RR: 18 (03-10-25 @ 04:22) (18 - 20)  SpO2: 98% (03-10-25 @ 06:11) (97% - 100%)            03-09 @ 07:01  -  03-10 @ 07:00  --------------------------------------------------------  IN: 430 mL / OUT: 0 mL / NET: 430 mL       Daily     Daily   Admit Wt: Drug Dosing Weight  Height (cm): 180.3 (30 Dec 2024 12:01)  Weight (kg): 85.1 (30 Dec 2024 12:01)  BMI (kg/m2): 26.2 (30 Dec 2024 12:01)  BSA (m2): 2.05 (30 Dec 2024 12:01)      CAPILLARY BLOOD GLUCOSE      POCT Blood Glucose.: 109 mg/dL (10 Mar 2025 04:23)  POCT Blood Glucose.: 88 mg/dL (09 Mar 2025 23:14)  POCT Blood Glucose.: 98 mg/dL (09 Mar 2025 17:58)  POCT Blood Glucose.: 97 mg/dL (09 Mar 2025 12:16)          MEDICATIONS  acetaminophen     Tablet .. 650 milliGRAM(s) Oral every 6 hours PRN  apixaban 2.5 milliGRAM(s) Oral two times a day  ascorbic acid 500 milliGRAM(s) Oral daily  aspirin  chewable 81 milliGRAM(s) Oral daily  atorvastatin 80 milliGRAM(s) Oral at bedtime  buDESOnide    Inhalation Suspension 0.5 milliGRAM(s) Inhalation two times a day  chlorhexidine 2% Cloths 1 Application(s) Topical daily  chlorhexidine 4% Liquid 1 Application(s) Topical <User Schedule>  dextrose 50% Injectable 50 milliLiter(s) IV Push every 15 minutes  epoetin ignacia (EPOGEN) Injectable 71618 Unit(s) IV Push <User Schedule>  ferrous    sulfate Liquid 300 milliGRAM(s) Enteral Tube daily  HYDROmorphone  Injectable 0.5 milliGRAM(s) IV Push every 6 hours PRN  insulin lispro (ADMELOG) corrective regimen sliding scale   SubCutaneous every 6 hours  lidocaine/prilocaine Cream 1 Application(s) Topical daily PRN  melatonin 5 milliGRAM(s) Oral at bedtime  methocarbamol 500 milliGRAM(s) Oral every 8 hours  metoprolol tartrate 25 milliGRAM(s) Oral two times a day  mirtazapine 7.5 milliGRAM(s) Oral at bedtime  Nephro-elicia 1 Tablet(s) Oral daily  pantoprazole  Injectable 40 milliGRAM(s) IV Push daily  sodium chloride 0.65% Nasal 1 Spray(s) Both Nostrils every 12 hours  sodium chloride 0.9% for Nebulization 3 milliLiter(s) Nebulizer every 6 hours  sodium chloride 0.9% lock flush 10 milliLiter(s) IV Push every 1 hour PRN  sodium chloride 0.9%. 1000 milliLiter(s) IV Continuous <Continuous>  sodium zirconium cyclosilicate 10 Gram(s) Oral <User Schedule>  traZODone 100 milliGRAM(s) Oral at bedtime      >>> <<<  PHYSICAL EXAM  Subjective: NAD  Neurology: alert and oriented x 3, nonfocal, no gross deficits  CV :s1s2 trach secretions  Sternal Wound :  CDI , Stable  Lungs:CTA  Abdomen: soft, NT,ND, (+ )BM  :  voiding  Extremities:  -c/c/e     LABS                             PAST MEDICAL & SURGICAL HISTORY:  Type 2 diabetes mellitus      Hypertension      End stage renal disease  started HD 2/2019 T, Th, Sat via right chest permacath      Bone spur  right shoulder- hx of - sx done      Anemia      Injury of right wrist  hx of at age 14      History of hyperkalemia  before HD- K-6.2 - to repeat in am of sx, pt had HD today      S/P arthroscopy of right shoulder  2010      History of vascular access device  right chest permacath - 2/2019      H/O right wrist surgery  tendons repair - at age 14      AV fistula      H/O ventral hernia repair      S/P cholecystectomy            VITAL SIGNS    Telemetry:  alan 60-80  Vital Signs Last 24 Hrs  T(C): 36.8 (03-09-25 @ 23:11), Max: 36.8 (03-09-25 @ 23:11)  T(F): 98.2 (03-09-25 @ 23:11), Max: 98.2 (03-09-25 @ 23:11)  HR: 62 (03-10-25 @ 06:11) (61 - 81)  BP: 117/49 (03-10-25 @ 04:22) (111/47 - 141/60)  RR: 18 (03-10-25 @ 04:22) (18 - 20)  SpO2: 98% (03-10-25 @ 06:11) (97% - 100%)            03-09 @ 07:01  -  03-10 @ 07:00  --------------------------------------------------------  IN: 430 mL / OUT: 0 mL / NET: 430 mL       Daily     Daily   Admit Wt: Drug Dosing Weight  Height (cm): 180.3 (30 Dec 2024 12:01)  Weight (kg): 85.1 (30 Dec 2024 12:01)  BMI (kg/m2): 26.2 (30 Dec 2024 12:01)  BSA (m2): 2.05 (30 Dec 2024 12:01)      CAPILLARY BLOOD GLUCOSE      POCT Blood Glucose.: 109 mg/dL (10 Mar 2025 04:23)  POCT Blood Glucose.: 88 mg/dL (09 Mar 2025 23:14)  POCT Blood Glucose.: 98 mg/dL (09 Mar 2025 17:58)  POCT Blood Glucose.: 97 mg/dL (09 Mar 2025 12:16)          MEDICATIONS  acetaminophen     Tablet .. 650 milliGRAM(s) Oral every 6 hours PRN  apixaban 2.5 milliGRAM(s) Oral two times a day  ascorbic acid 500 milliGRAM(s) Oral daily  aspirin  chewable 81 milliGRAM(s) Oral daily  atorvastatin 80 milliGRAM(s) Oral at bedtime  buDESOnide    Inhalation Suspension 0.5 milliGRAM(s) Inhalation two times a day  chlorhexidine 2% Cloths 1 Application(s) Topical daily  chlorhexidine 4% Liquid 1 Application(s) Topical <User Schedule>  dextrose 50% Injectable 50 milliLiter(s) IV Push every 15 minutes  epoetin ignacia (EPOGEN) Injectable 07488 Unit(s) IV Push <User Schedule>  ferrous    sulfate Liquid 300 milliGRAM(s) Enteral Tube daily  HYDROmorphone  Injectable 0.5 milliGRAM(s) IV Push every 6 hours PRN  insulin lispro (ADMELOG) corrective regimen sliding scale   SubCutaneous every 6 hours  lidocaine/prilocaine Cream 1 Application(s) Topical daily PRN  melatonin 5 milliGRAM(s) Oral at bedtime  methocarbamol 500 milliGRAM(s) Oral every 8 hours  metoprolol tartrate 25 milliGRAM(s) Oral two times a day  mirtazapine 7.5 milliGRAM(s) Oral at bedtime  Nephro-elicia 1 Tablet(s) Oral daily  pantoprazole  Injectable 40 milliGRAM(s) IV Push daily  sodium chloride 0.65% Nasal 1 Spray(s) Both Nostrils every 12 hours  sodium chloride 0.9% for Nebulization 3 milliLiter(s) Nebulizer every 6 hours  sodium chloride 0.9% lock flush 10 milliLiter(s) IV Push every 1 hour PRN  sodium chloride 0.9%. 1000 milliLiter(s) IV Continuous <Continuous>  sodium zirconium cyclosilicate 10 Gram(s) Oral <User Schedule>  traZODone 100 milliGRAM(s) Oral at bedtime      >>> <<<  PHYSICAL EXAM  Subjective: NAD  Neurology: alert and oriented x 3, nonfocal, no gross deficits  CV :s1s2 trach secretions  Sternal Wound :  CDI , Stable  Lungs:CTA  Abdomen: soft, NT,ND, (+ )BM  sacral decub wound vac intact  Extremities:  -c/c/e LUE AVF    LABS                             PAST MEDICAL & SURGICAL HISTORY:  Type 2 diabetes mellitus      Hypertension      End stage renal disease  started HD 2/2019 T, Th, Sat via right chest permacath      Bone spur  right shoulder- hx of - sx done      Anemia      Injury of right wrist  hx of at age 14      History of hyperkalemia  before HD- K-6.2 - to repeat in am of sx, pt had HD today      S/P arthroscopy of right shoulder  2010      History of vascular access device  right chest permacath - 2/2019      H/O right wrist surgery  tendons repair - at age 14      AV fistula      H/O ventral hernia repair      S/P cholecystectomy

## 2025-03-10 NOTE — CONSULT NOTE ADULT - ASSESSMENT
54M with PMH of HTN, HLD, ESRD on HD MWF via LUE AVF, ascites (requiring repeat paracenteses q4-6wks), NICM with moderate LV dysfunction w/ EF 35-40%(2023) and CAD s/p atherectomy + SALLIE to D1(4/11/2024) presents to [facility] transferred from [facility]. Now s/p CABG x3, followed by complicated hospital course. S/p tracheostomy on 1/18, downsized to #7 uncuffed portex on 2/27. Has been tolerating trach collar. ENT called to evaluate for possible decannulation. SLP performed FEES on 3/3 showing aspiration and poor vocal cord motion. Laryngoscopy performed which showed aspiration of secretions and limited vocal cord abduction b/l. Tracheoscopy performed which was wnl.

## 2025-03-11 RX ADMIN — Medication 5 MILLIGRAM(S): at 21:38

## 2025-03-11 RX ADMIN — Medication 3 MILLILITER(S): at 23:08

## 2025-03-11 RX ADMIN — Medication 5 MILLIGRAM(S): at 00:36

## 2025-03-11 RX ADMIN — APIXABAN 2.5 MILLIGRAM(S): 2.5 TABLET, FILM COATED ORAL at 05:27

## 2025-03-11 RX ADMIN — METOPROLOL SUCCINATE 25 MILLIGRAM(S): 50 TABLET, EXTENDED RELEASE ORAL at 05:26

## 2025-03-11 RX ADMIN — Medication 100 MILLIGRAM(S): at 00:36

## 2025-03-11 RX ADMIN — METHOCARBAMOL 500 MILLIGRAM(S): 500 TABLET, FILM COATED ORAL at 21:38

## 2025-03-11 RX ADMIN — ATORVASTATIN CALCIUM 80 MILLIGRAM(S): 80 TABLET, FILM COATED ORAL at 21:38

## 2025-03-11 RX ADMIN — Medication 1 TABLET(S): at 12:09

## 2025-03-11 RX ADMIN — APIXABAN 2.5 MILLIGRAM(S): 2.5 TABLET, FILM COATED ORAL at 18:59

## 2025-03-11 RX ADMIN — Medication 0.5 MILLIGRAM(S): at 12:57

## 2025-03-11 RX ADMIN — Medication 300 MILLIGRAM(S): at 12:08

## 2025-03-11 RX ADMIN — Medication 0.5 MILLIGRAM(S): at 19:10

## 2025-03-11 RX ADMIN — METOPROLOL SUCCINATE 25 MILLIGRAM(S): 50 TABLET, EXTENDED RELEASE ORAL at 18:59

## 2025-03-11 RX ADMIN — Medication 3 MILLILITER(S): at 18:59

## 2025-03-11 RX ADMIN — Medication 0.5 MILLIGRAM(S): at 13:30

## 2025-03-11 RX ADMIN — BUDESONIDE 0.5 MILLIGRAM(S): 0.25 SUSPENSION RESPIRATORY (INHALATION) at 18:59

## 2025-03-11 RX ADMIN — Medication 81 MILLIGRAM(S): at 12:08

## 2025-03-11 RX ADMIN — Medication 1 SPRAY(S): at 18:58

## 2025-03-11 RX ADMIN — Medication 100 MILLIGRAM(S): at 21:38

## 2025-03-11 RX ADMIN — MIRTAZAPINE 7.5 MILLIGRAM(S): 30 TABLET, FILM COATED ORAL at 21:39

## 2025-03-11 RX ADMIN — Medication 500 MILLIGRAM(S): at 12:09

## 2025-03-11 RX ADMIN — Medication 1 SPRAY(S): at 05:00

## 2025-03-11 RX ADMIN — Medication 40 MILLIGRAM(S): at 12:07

## 2025-03-11 RX ADMIN — Medication 0.5 MILLIGRAM(S): at 19:40

## 2025-03-11 RX ADMIN — Medication 3 MILLILITER(S): at 12:07

## 2025-03-11 NOTE — PROGRESS NOTE ADULT - SUBJECTIVE AND OBJECTIVE BOX
VITAL SIGNS    Telemetry:   torrie 60's    Vital Signs Last 24 Hrs  T(C): 36.9 (03-11-25 @ 09:19), Max: 37.6 (03-11-25 @ 00:06)  T(F): 98.5 (03-11-25 @ 09:19), Max: 99.7 (03-11-25 @ 00:06)  HR: 62 (03-11-25 @ 09:19) (60 - 112)  BP: 141/62 (03-11-25 @ 09:19) (100/64 - 168/71)  RR: 17 (03-11-25 @ 09:19) (17 - 20)  SpO2: 100% (03-11-25 @ 09:19) (90% - 100%)                   03-10 @ 07:01  -  03-11 @ 07:00  --------------------------------------------------------  IN: 690 mL / OUT: 2501 mL / NET: -1811 mL          Daily     Daily             CAPILLARY BLOOD GLUCOSE      POCT Blood Glucose.: 110 mg/dL (11 Mar 2025 00:01)  POCT Blood Glucose.: 74 mg/dL (10 Mar 2025 17:19)  POCT Blood Glucose.: 102 mg/dL (10 Mar 2025 12:28)            Drains:      Pacing Wires        [  ]   Settings:                                  Isolated  [ x ]    Coumadin    [ ] YES          [ x]      NO         eliquis                          PHYSICAL EXAM        Neurology: alert and oriented x 3, nonfocal, no gross deficits  CV : s1 s2 RRR  Sternal Wound :  CDI , Stable  Lungs: UPPER rhonchi, productive cough, clear thick secretions via trach  Abdomen: soft, nontender, nondistended, positive bowel sounds                       :  esrd      Extremities:   trace    edema   /  -   calve tenderness ,    L leg    incisions cdi          acetaminophen     Tablet .. 650 milliGRAM(s) Oral every 6 hours PRN  apixaban 2.5 milliGRAM(s) Oral two times a day  ascorbic acid 500 milliGRAM(s) Oral daily  aspirin  chewable 81 milliGRAM(s) Oral daily  atorvastatin 80 milliGRAM(s) Oral at bedtime  buDESOnide    Inhalation Suspension 0.5 milliGRAM(s) Inhalation two times a day  chlorhexidine 2% Cloths 1 Application(s) Topical daily  chlorhexidine 4% Liquid 1 Application(s) Topical <User Schedule>  dextrose 50% Injectable 50 milliLiter(s) IV Push every 15 minutes  epoetin ignacia (EPOGEN) Injectable 36149 Unit(s) IV Push <User Schedule>  ferrous    sulfate Liquid 300 milliGRAM(s) Enteral Tube daily  HYDROmorphone  Injectable 0.5 milliGRAM(s) IV Push every 6 hours PRN  insulin lispro (ADMELOG) corrective regimen sliding scale   SubCutaneous every 6 hours  lidocaine/prilocaine Cream 1 Application(s) Topical daily PRN  melatonin 5 milliGRAM(s) Oral at bedtime  methocarbamol 500 milliGRAM(s) Oral every 8 hours  metoprolol tartrate 25 milliGRAM(s) Oral two times a day  mirtazapine 7.5 milliGRAM(s) Oral at bedtime  Nephro-elicia 1 Tablet(s) Oral daily  pantoprazole  Injectable 40 milliGRAM(s) IV Push daily  sodium chloride 0.65% Nasal 1 Spray(s) Both Nostrils every 12 hours  sodium chloride 0.9% for Nebulization 3 milliLiter(s) Nebulizer every 6 hours  sodium chloride 0.9% lock flush 10 milliLiter(s) IV Push every 1 hour PRN  sodium chloride 0.9%. 1000 milliLiter(s) IV Continuous <Continuous>  sodium zirconium cyclosilicate 10 Gram(s) Oral <User Schedule>  traZODone 100 milliGRAM(s) Oral at bedtime                    Physical Therapy Rec:   Home  [  ]   Home w/ PT  [  ]  Rehab  [  ]  Discussed with Cardiothoracic Team at AM rounds.

## 2025-03-11 NOTE — PROGRESS NOTE ADULT - SUBJECTIVE AND OBJECTIVE BOX
[facility]    Dr. Nica Styles     Office [phone] (9 am to 5 pm)  Service: [phone] (5pm to 9am)  Also Available on TEAMS      RENAL PROGRESS NOTE: DATE OF SERVICE 03-11-25 @ 11:42    Patient is a 54y old  Male who presents with a chief complaint of CAD (10 Mar 2025 08:40)      Patient seen and examined at bedside. No chest pain/sob    VITALS:  T(F): 97.9 (03-11-25 @ 11:16), Max: 99.7 (03-11-25 @ 00:06)  HR: 64 (03-11-25 @ 11:16)  BP: 124/58 (03-11-25 @ 11:16)  RR: 18 (03-11-25 @ 11:16)  SpO2: 96% (03-11-25 @ 11:16)  Wt(kg): --    03-10 @ 07:01  -  03-11 @ 07:00  --------------------------------------------------------  IN: 690 mL / OUT: 2501 mL / NET: -1811 mL          PHYSICAL EXAM:  Constitutional: NAD  Neck: No JVD  Respiratory: CTAB, no wheezes, rales or rhonchi  Cardiovascular: S1, S2, RRR  Gastrointestinal: BS+, soft, NT/ND  Extremities: No peripheral edema    Hospital Medications:   MEDICATIONS  (STANDING):  apixaban 2.5 milliGRAM(s) Oral two times a day  ascorbic acid 500 milliGRAM(s) Oral daily  aspirin  chewable 81 milliGRAM(s) Oral daily  atorvastatin 80 milliGRAM(s) Oral at bedtime  buDESOnide    Inhalation Suspension 0.5 milliGRAM(s) Inhalation two times a day  chlorhexidine 2% Cloths 1 Application(s) Topical daily  chlorhexidine 4% Liquid 1 Application(s) Topical <User Schedule>  dextrose 50% Injectable 50 milliLiter(s) IV Push every 15 minutes  epoetin ignacia (EPOGEN) Injectable 16697 Unit(s) IV Push <User Schedule>  ferrous    sulfate Liquid 300 milliGRAM(s) Enteral Tube daily  insulin lispro (ADMELOG) corrective regimen sliding scale   SubCutaneous every 6 hours  melatonin 5 milliGRAM(s) Oral at bedtime  methocarbamol 500 milliGRAM(s) Oral every 8 hours  metoprolol tartrate 25 milliGRAM(s) Oral two times a day  mirtazapine 7.5 milliGRAM(s) Oral at bedtime  Nephro-elicia 1 Tablet(s) Oral daily  pantoprazole  Injectable 40 milliGRAM(s) IV Push daily  sodium chloride 0.65% Nasal 1 Spray(s) Both Nostrils every 12 hours  sodium chloride 0.9% for Nebulization 3 milliLiter(s) Nebulizer every 6 hours  sodium chloride 0.9%. 1000 milliLiter(s) (10 mL/Hr) IV Continuous <Continuous>  sodium zirconium cyclosilicate 10 Gram(s) Oral <User Schedule>  traZODone 100 milliGRAM(s) Oral at bedtime      LABS:  03-10    138  |  95[L]  |  34[H]  ----------------------------<  99  3.9   |  24  |  6.17[H]    Ca    8.9      10 Mar 2025 12:41  Phos  5.8     03-10  Mg     2.7     03-10    TPro  6.4  /  Alb  2.8[L]  /  TBili  0.4  /  DBili      /  AST  21  /  ALT  11  /  AlkPhos  94  03-10    Creatinine Trend: 6.17 <--, 5.31 <--, 4.92 <--    Albumin: 2.8 g/dL (03-10 @ 12:41)  Phosphorus: 5.8 mg/dL (03-10 @ 12:41)                              9.8    7.39  )-----------( 251      ( 10 Mar 2025 12:41 )             30.7     Urine Studies:  Urinalysis - [03-10-25 @ 12:41]      Color  / Appearance  / SG  / pH       Gluc 99 / Ketone   / Bili  / Urobili        Blood  / Protein  / Leuk Est  / Nitrite       RBC  / WBC  / Hyaline  / Gran  / Sq Epi  / Non Sq Epi  / Bacteria       Iron 29, TIBC 143, %sat 20      [12-19-24 @ 05:00]  Ferritin 904      [12-19-24 @ 05:00]  TSH 4.75      [12-24-24 @ 06:50]        RADIOLOGY & ADDITIONAL STUDIES:

## 2025-03-11 NOTE — PROGRESS NOTE ADULT - ASSESSMENT
53 yo M with multiple medical problems including CAD s/p PCI in 2024 s/p CABG x 3 on 24    1/: Intubated for hypoxia & left lung white out s/p awake bronch with removal of copious mucopurulent secretions  : s/p IABP insertion, sepsis/septic shock due to E coli   ESBL pneumonia, collapsed Left Lung, s/p multiple bronch    tracheostomy tube placement    VRE E. Faecium Bcx   +HSV PCR BAL   tissue cx from back abscess - Serratia/MRSA  - - intermittent bradycardia/junctional episodes with hypotension  2/3: off , off theophylline. iHD 1L UF  : bronch for Left lung collapse wound vac, 2decho w/ moderate pericardial effusion - similar as before, US abd: small - moderate ascites - monitor at this time.  Wound vac placed for sacral wounds  : iHD-1L UF  : bronch today off positive pressure   : concern for left food drop   2/10 : no acute issues - CXR shows improving left sided aeration, pt subjectively reports having difficulty while lying flat  : s/p Paracentesis 3.5 L removed, leukocytosis   : Ascites fluid PMN < 250 (less likely SBP)   sniff test yesterday showed paradoxical diaphragmatic motion    : mucus plugging but able to clear airway with aggressive pulmonary toileting.   : no vent requirement overnight, able to mobilize secretion with pulm toileting  hyperkalemia post HD - workup for possible recirculation underway   : On TC X 48 hours - ambulating well, no mucus plugging events  : HFTC x24hrs (40L 30%)  : iHD 1.5L UF  : iHD 2L  : trach downsized to 7, bronch  - cultures growing ESBL E Coli, sacral wound vac changed   3/6 Transfer to SDU, +PW isolated, +sacral wound vac in place, + Tube feeds running, next HD scheduled for tomorrow   3/7 VSS; Continue with current medication regimen.  Continue on TC 40% 02.  Plan for HD today.  Nocturnal feeding.  3/8  FEES 3/3 notable for poor secretion management evidenced unable to clear 2/2 weak cough B/l vocal cords appeared to be adducted at midline. ENT eval demonstrates  limited vocal cord abduction vs. questionable vocal cord paresis--no plan for ENT intervention at this time, No PMV . Local sacral wound care by PT.   3/9 HD tomorrow, US abd in am Tolerating pm feeds  3/10 US abd and HD today. CXR PA/LAT. Consider ENT to reassess VC paresis feasibility of decannulation   Disposition: Home PT / OT     3/11  suction prn.  OOB to chair.  Eliquis for a flutter.  ENT note appreciated  Maintain npo, S&S f/u

## 2025-03-11 NOTE — PROGRESS NOTE ADULT - SUBJECTIVE AND OBJECTIVE BOX
MR#[id number]  PATIENT NAME:RAAD CANO    DATE OF SERVICE: 03-11-25 @ 0633  Patient was seen and examined by Solo Quick MD on    03-11-25 @ 0635  Interim events noted.Consultant notes ,Labs,Telemetry reviewed by me       HOSPITAL COURSE: HPI:  54M with PMH of HTN, HLD, ESRD on HD MWF via LUE AVF, ascites (requiring repeat paracenteses q4-6wks), NICM with moderate LV dysfunction w/ EF 35-40%(2023) and CAD s/p atherectomy + SALLIE to D1(4/11/2024) presents to [facility] transferred from [facility]. Patient initially presented to [facility] ED with shortness of breath. Of note he was recently admitted from 09/27-12/02 for acute cholecystitis s/p cholecystectomy. In [facility] ED, pt was hypoxic to 86% on RA, trop 1.7K, EKG no new changes, CXR fluid overload. Admitted for AHRF 2/2 fluid overload. Pt received HD on 12/18, 12/19, 12/20 and 12/22. DAPT was resumed, TTE showed improvement in LVEF to 40-45%. Stress test was abnormal with 1mm inferior STD, reversible ischemia in the inferior wall and fixed defects in the lateral, anterior and apical walls with post stress EF of 24%. Pt was then transferred to [facility] for cardiac cath which showed pLAD 70% ISR, mLCx 70%, D1 severe dz. Patient now transferred to [facility] CTS Dr. Rivers for CABG eval. Patient denies any current SOB or chest pain on admission. Otherwise denies headaches, abdominal pain, urinary or bowel changes, fevers, chills, N/V/D, sick contacts.  (24 Dec 2024 05:07)      INTERIM EVENTS:Patient seen at bedside ,interim events noted.      PMH -reviewed admission note, no change since admission  HEART FAILURE: Acute[ ]Chronic[ ] Systolic[ ] Diastolic[ ] Combined Systolic and Diastolic[ ]  CAD[ ] CABG[ ] PCI[ ]  DEVICES[ ] PPM[ ] ICD[ ] ILR[ ]  ATRIAL FIBRILLATION[ ] Paroxysmal[ ] Permanent[ ] CHADS2-[  ]  GHASSAN[ ] CKD1[ ] CKD2[ ] CKD3[ ] CKD4[ ] ESRD[ ]  COPD[ ] HTN[ ]   DM[ ] Type1[ ] Type 2[ ]   CVA[ ] Paresis[ ]    AMBULATION: Assisted[ ] Cane/walker[ ] Independent[ ]    MEDICATIONS  (STANDING):  apixaban 2.5 milliGRAM(s) Oral two times a day  ascorbic acid 500 milliGRAM(s) Oral daily  aspirin  chewable 81 milliGRAM(s) Oral daily  atorvastatin 80 milliGRAM(s) Oral at bedtime  buDESOnide    Inhalation Suspension 0.5 milliGRAM(s) Inhalation two times a day  chlorhexidine 2% Cloths 1 Application(s) Topical daily  chlorhexidine 4% Liquid 1 Application(s) Topical <User Schedule>  dextrose 50% Injectable 50 milliLiter(s) IV Push every 15 minutes  epoetin ignacia (EPOGEN) Injectable 57896 Unit(s) IV Push <User Schedule>  ferrous    sulfate Liquid 300 milliGRAM(s) Enteral Tube daily  insulin lispro (ADMELOG) corrective regimen sliding scale   SubCutaneous every 6 hours  melatonin 5 milliGRAM(s) Oral at bedtime  methocarbamol 500 milliGRAM(s) Oral every 8 hours  metoprolol tartrate 25 milliGRAM(s) Oral two times a day  mirtazapine 7.5 milliGRAM(s) Oral at bedtime  Nephro-elicia 1 Tablet(s) Oral daily  pantoprazole  Injectable 40 milliGRAM(s) IV Push daily  sodium chloride 0.65% Nasal 1 Spray(s) Both Nostrils every 12 hours  sodium chloride 0.9% for Nebulization 3 milliLiter(s) Nebulizer every 6 hours  sodium chloride 0.9%. 1000 milliLiter(s) (10 mL/Hr) IV Continuous <Continuous>  sodium zirconium cyclosilicate 10 Gram(s) Oral <User Schedule>  traZODone 100 milliGRAM(s) Oral at bedtime    MEDICATIONS  (PRN):  acetaminophen     Tablet .. 650 milliGRAM(s) Oral every 6 hours PRN Mild Pain (1 - 3)  HYDROmorphone  Injectable 0.5 milliGRAM(s) IV Push every 6 hours PRN Severe Pain (7 - 10)  lidocaine/prilocaine Cream 1 Application(s) Topical daily PRN pre-HD  sodium chloride 0.9% lock flush 10 milliLiter(s) IV Push every 1 hour PRN Pre/post blood products, medications, blood draw, and to maintain line patency            REVIEW OF SYSTEMS:  Constitutional: [ ] fever, [ ]weight loss,  [ ]fatigue [ ]weight gain  Eyes: [ ] visual changes  Respiratory: [ ]shortness of breath;  [ ] cough, [ ]wheezing, [ ]chills, [ ]hemoptysis  Cardiovascular: [ ] chest pain, [ ]palpitations, [ ]dizziness,  [ ]leg swelling[ ]orthopnea[ ]PND  Gastrointestinal: [ ] abdominal pain, [ ]nausea, [ ]vomiting,  [ ]diarrhea [ ]Constipation [ ]Melena  Genitourinary: [ ] dysuria, [ ] hematuria [ ]Vigil  Neurologic: [ ] headaches [ ] tremors[ ]weakness [ ]Paralysis Right[ ] Left[ ]  Skin: [ ] itching, [ ]burning, [ ] rashes  Endocrine: [ ] heat or cold intolerance  Musculoskeletal: [ ] joint pain or swelling; [ ] muscle, back, or extremity pain  Psychiatric: [ ] depression, [ ]anxiety, [ ]mood swings, or [ ]difficulty sleeping  Hematologic: [ ] easy bruising, [ ] bleeding gums    [ ] All remaining systems negative except as per above.   [ ]Unable to obtain.  [x] No change in ROS since admission      Vital Signs Last 24 Hrs  T(C): 36 (11 Mar 2025 18:46), Max: 37.6 (11 Mar 2025 00:06)  T(F): 96.8 (11 Mar 2025 18:46), Max: 99.7 (11 Mar 2025 00:06)  HR: 63 (11 Mar 2025 18:46) (62 - 112)  BP: 118/53 (11 Mar 2025 18:46) (114/51 - 168/71)  BP(mean): 76 (11 Mar 2025 18:46) (73 - 102)  RR: 18 (11 Mar 2025 11:16) (17 - 18)  SpO2: 96% (11 Mar 2025 11:16) (90% - 100%)    Parameters below as of 11 Mar 2025 11:16  Patient On (Oxygen Delivery Method): tracheostomy collar      I&O's Summary    10 Mar 2025 07:01  -  11 Mar 2025 07:00  --------------------------------------------------------  IN: 690 mL / OUT: 2501 mL / NET: -1811 mL    11 Mar 2025 07:01  -  11 Mar 2025 19:46  --------------------------------------------------------  IN: 100 mL / OUT: 0 mL / NET: 100 mL        PHYSICAL EXAM:  General: No acute distress BMI-  HEENT: EOMI, PERRL  Neck: Supple, [ ] JVD  Lungs: Equal air entry bilaterally; [ ] rales [ ] wheezing [ ] rhonchi  Heart: Regular rate and rhythm; [x ] murmur   2/6 [ x] systolic [ ] diastolic [ ] radiation[ ] rubs [ ]  gallops  Abdomen: Nontender, bowel sounds present  Extremities: No clubbing, cyanosis, [ ] edema [ ]Pulses  equal and intact  Nervous system:  Alert & Oriented X3, no focal deficits  Psychiatric: Normal affect  Skin: No rashes or lesions    LABS:  03-10    138  |  95[L]  |  34[H]  ----------------------------<  99  3.9   |  24  |  6.17[H]    Ca    8.9      10 Mar 2025 12:41  Phos  5.8     03-10  Mg     2.7     03-10    TPro  6.4  /  Alb  2.8[L]  /  TBili  0.4  /  DBili  x   /  AST  21  /  ALT  11  /  AlkPhos  94  03-10    Creatinine Trend: 6.17<--, 5.31<--, 4.92<--, 3.99<--, 5.47<--, 4.89<--                        9.8    7.39  )-----------( 251      ( 10 Mar 2025 12:41 )             30.7

## 2025-03-11 NOTE — CHART NOTE - NSCHARTNOTEFT_GEN_A_CORE
NUTRITION FOLLOW UP NOTE    PATIENT SEEN FOR: follow up     SOURCE: [] Patient  [x] Current Medical Record  [x] RN  [] Family/support person at bedside  [x] Patient unavailable/inappropriate  [x] Other: NP    CHART REVIEWED/EVENTS NOTED.  [] No changes to nutrition care plan to note  [x] Nutrition Status:  -S/P CABG x 3  -ESRD on dialysis  -3/3 Swallow FEES Assessment Adult Recommendations: "Continue NPO w/ alternative means of nutrition/hydration/medication"    DIET ORDER:   Diet, NPO with Tube Feed:   Tube Feeding Modality: Nasogastric  [employer] Peptide 1.5 (KFPEPT1.5RTH)  Total Volume for 24 Hours (mL): 840  Continuous  Starting Tube Feed Rate {mL per Hour}: 20  Increase Tube Feed Rate by (mL): 10     Every hour  Until Goal Tube Feed Rate (mL per Hour): 70  Tube Feed Duration (in Hours): 12  Tube Feed Start Time: 18:00  Tube Feed Stop Time: 06:00    Start Time: 23:00 (25)    CURRENT DIET ORDER IS:  [] Appropriate:  [x] Inadequate: see below for recommendations  [] Other:    NUTRITION INTAKE/PROVISION:  [] PO:  [x] Enteral Nutrition: providing at 100% provision: 1292kcal and 62g protein. This is 64% EER x 9 days.   [] Parenteral Nutrition:    ANTHROPOMETRICS:  Drug Dosing Weight  Height (cm): 180.3 (30 Dec 2024 12:01)  Weight (kg): 85.1 (30 Dec 2024 12:01)  BMI (kg/m2): 26.2 (30 Dec 2024 12:)  BSA (m2): 2.05 (30 Dec 2024 12:01)  Weights:   Daily Weight in k.2 (148 pounds) () - bedscale and no edema noted, Weight in k.9 (), Weight in k.9 (), Weight in k.9 (), Weight in k.6 (184 pounds) () no edema noted  Pt refusing weights recently  Pt noted with a 36 pound / 19.5% loss since admission.   RD will continue to monitor trends.   BMI using most recent weight 20.7    MEDICATIONS:  MEDICATIONS  (STANDING):  apixaban 2.5 milliGRAM(s) Oral two times a day  ascorbic acid 500 milliGRAM(s) Oral daily  aspirin  chewable 81 milliGRAM(s) Oral daily  atorvastatin 80 milliGRAM(s) Oral at bedtime  buDESOnide    Inhalation Suspension 0.5 milliGRAM(s) Inhalation two times a day  chlorhexidine 2% Cloths 1 Application(s) Topical daily  chlorhexidine 4% Liquid 1 Application(s) Topical <User Schedule>  dextrose 50% Injectable 50 milliLiter(s) IV Push every 15 minutes  epoetin ignacia (EPOGEN) Injectable 31781 Unit(s) IV Push <User Schedule>  ferrous    sulfate Liquid 300 milliGRAM(s) Enteral Tube daily  insulin lispro (ADMELOG) corrective regimen sliding scale   SubCutaneous every 6 hours  melatonin 5 milliGRAM(s) Oral at bedtime  methocarbamol 500 milliGRAM(s) Oral every 8 hours  metoprolol tartrate 25 milliGRAM(s) Oral two times a day  mirtazapine 7.5 milliGRAM(s) Oral at bedtime  Nephro-elicia 1 Tablet(s) Oral daily  pantoprazole  Injectable 40 milliGRAM(s) IV Push daily  sodium chloride 0.65% Nasal 1 Spray(s) Both Nostrils every 12 hours  sodium chloride 0.9% for Nebulization 3 milliLiter(s) Nebulizer every 6 hours  sodium chloride 0.9%. 1000 milliLiter(s) (10 mL/Hr) IV Continuous <Continuous>  sodium zirconium cyclosilicate 10 Gram(s) Oral <User Schedule>  traZODone 100 milliGRAM(s) Oral at bedtime    MEDICATIONS  (PRN):  acetaminophen     Tablet .. 650 milliGRAM(s) Oral every 6 hours PRN Mild Pain (1 - 3)  HYDROmorphone  Injectable 0.5 milliGRAM(s) IV Push every 6 hours PRN Severe Pain (7 - 10)  lidocaine/prilocaine Cream 1 Application(s) Topical daily PRN pre-HD  sodium chloride 0.9% lock flush 10 milliLiter(s) IV Push every 1 hour PRN Pre/post blood products, medications, blood draw, and to maintain line patency    NUTRITIONALLY PERTINENT LABS:  03-10 Na138 mmol/L Glu 99 mg/dL K+ 3.9 mmol/L Cr  6.17 mg/dL[H] BUN 34 mg/dL[H] 03-10 Phos 5.8 mg/dL[H] 03-10 Alb 2.8 g/dL[L]03-10 ALT 11 U/L AST 21 U/L Alkaline Phosphatase 94 U/L    A1C with Estimated Average Glucose Result: 5.6 % (- @ 06:50)    Finger Sticks:  POCT Blood Glucose.: 110 mg/dL ( @ 00:01)  POCT Blood Glucose.: 74 mg/dL (03-10 @ 17:19)  POCT Blood Glucose.: 102 mg/dL (03-10 @ 12:28)    NUTRITIONALLY PERTINENT MEDICATIONS/LABS:  [x] Reviewed  [x] Relevant notes on medications/labs:  -magnesium and phosphorus elevated. noted to be on HD.     EDEMA:  [x] Reviewed  [] Relevant notes:    GI/ I&O:  [x] Reviewed  [] Relevant notes:  [] Other:    SKIN:   per flow sheets:  mid sternal incision   coccyx stage IV    ESTIMATED NEEDS:  Based on: most recent weight 67.2kg  30-35kcal/kg -2352kcal/day  1.3-1.6g/kg 87-108g protein/day  defer fluid needs to team    NUTRITION DIAGNOSIS:  [x] Prior Dx:  Inadequate energy intake,-->advanced as below Increased Nutrient Needs (protein-energy, micronutrients)  [x] New Dx: Moderate acute malnutrition related to not meeting EER as evidenced by weight loss >10% x 3 months, </= 75% EER x >/= 7 days.     EDUCATION:  [] Yes:  [x] Not appropriate/warranted    NUTRITION CARE PLAN:  1. Diet:  2. Supplements:  3. Multivitamin/mineral supplementation:  4:     [] Achieved - Continue current nutrition intervention(s)  [] Current medical condition precludes nutrition intervention at this time.    MONITORING AND EVALUATION:   RD remains available upon request and will follow up per protocol.    Name  Available on [facility] TEAMS NUTRITION FOLLOW UP NOTE    PATIENT SEEN FOR: follow up     SOURCE: [] Patient  [x] Current Medical Record  [x] RN  [] Family/support person at bedside  [x] Patient unavailable/inappropriate  [x] Other: NP    CHART REVIEWED/EVENTS NOTED.  [] No changes to nutrition care plan to note  [x] Nutrition Status:  -S/P CABG x 3  -ESRD on dialysis  -3/3 Swallow FEES Assessment Adult Recommendations: "Continue NPO w/ alternative means of nutrition/hydration/medication"    DIET ORDER:   Diet, NPO with Tube Feed:   Tube Feeding Modality: Nasogastric  [employer] Peptide 1.5 (KFPEPT1.5RTH)  Total Volume for 24 Hours (mL): 840  Continuous  Starting Tube Feed Rate {mL per Hour}: 20  Increase Tube Feed Rate by (mL): 10     Every hour  Until Goal Tube Feed Rate (mL per Hour): 70  Tube Feed Duration (in Hours): 12  Tube Feed Start Time: 18:00  Tube Feed Stop Time: 06:00    Start Time: 23:00 (25)    CURRENT DIET ORDER IS:  [] Appropriate:  [x] Inadequate: see below for recommendations  [] Other:    NUTRITION INTAKE/PROVISION:  [] PO:  [x] Enteral Nutrition: providing at 100% provision: 1292kcal and 62g protein. This is 64% EER x 9 days.   [] Parenteral Nutrition:    ANTHROPOMETRICS:  Drug Dosing Weight  Height (cm): 180.3 (30 Dec 2024 12:01)  Weight (kg): 85.1 (30 Dec 2024 12:01)  BMI (kg/m2): 26.2 (30 Dec 2024 12:)  BSA (m2): 2.05 (30 Dec 2024 12:01)  Weights:   Daily Weight in k.2 (148 pounds) () - bedscale and no edema noted, Weight in k.9 (), Weight in k.9 (), Weight in k.9 (), Weight in k.6 (184 pounds) () no edema noted  Pt refusing weights recently  Pt noted with a 36 pound / 19.5% loss since admission.   RD will continue to monitor trends.   BMI using most recent weight 20.7    MEDICATIONS:  MEDICATIONS  (STANDING):  apixaban 2.5 milliGRAM(s) Oral two times a day  ascorbic acid 500 milliGRAM(s) Oral daily  aspirin  chewable 81 milliGRAM(s) Oral daily  atorvastatin 80 milliGRAM(s) Oral at bedtime  buDESOnide    Inhalation Suspension 0.5 milliGRAM(s) Inhalation two times a day  chlorhexidine 2% Cloths 1 Application(s) Topical daily  chlorhexidine 4% Liquid 1 Application(s) Topical <User Schedule>  dextrose 50% Injectable 50 milliLiter(s) IV Push every 15 minutes  epoetin ignacia (EPOGEN) Injectable 02913 Unit(s) IV Push <User Schedule>  ferrous    sulfate Liquid 300 milliGRAM(s) Enteral Tube daily  insulin lispro (ADMELOG) corrective regimen sliding scale   SubCutaneous every 6 hours  melatonin 5 milliGRAM(s) Oral at bedtime  methocarbamol 500 milliGRAM(s) Oral every 8 hours  metoprolol tartrate 25 milliGRAM(s) Oral two times a day  mirtazapine 7.5 milliGRAM(s) Oral at bedtime  Nephro-elicia 1 Tablet(s) Oral daily  pantoprazole  Injectable 40 milliGRAM(s) IV Push daily  sodium chloride 0.65% Nasal 1 Spray(s) Both Nostrils every 12 hours  sodium chloride 0.9% for Nebulization 3 milliLiter(s) Nebulizer every 6 hours  sodium chloride 0.9%. 1000 milliLiter(s) (10 mL/Hr) IV Continuous <Continuous>  sodium zirconium cyclosilicate 10 Gram(s) Oral <User Schedule>  traZODone 100 milliGRAM(s) Oral at bedtime    MEDICATIONS  (PRN):  acetaminophen     Tablet .. 650 milliGRAM(s) Oral every 6 hours PRN Mild Pain (1 - 3)  HYDROmorphone  Injectable 0.5 milliGRAM(s) IV Push every 6 hours PRN Severe Pain (7 - 10)  lidocaine/prilocaine Cream 1 Application(s) Topical daily PRN pre-HD  sodium chloride 0.9% lock flush 10 milliLiter(s) IV Push every 1 hour PRN Pre/post blood products, medications, blood draw, and to maintain line patency    NUTRITIONALLY PERTINENT LABS:  03-10 Na138 mmol/L Glu 99 mg/dL K+ 3.9 mmol/L Cr  6.17 mg/dL[H] BUN 34 mg/dL[H] 03-10 Phos 5.8 mg/dL[H] -10 Alb 2.8 g/dL[L]03-10 ALT 11 U/L AST 21 U/L Alkaline Phosphatase 94 U/L    A1C with Estimated Average Glucose Result: 5.6 % (- @ 06:50)    Finger Sticks:  POCT Blood Glucose.: 110 mg/dL ( @ 00:01)  POCT Blood Glucose.: 74 mg/dL (03-10 @ 17:19)  POCT Blood Glucose.: 102 mg/dL (03-10 @ 12:28)    NUTRITIONALLY PERTINENT MEDICATIONS/LABS:  [x] Reviewed  [x] Relevant notes on medications/labs:  -magnesium and phosphorus elevated. noted to be on HD.     EDEMA:  [x] Reviewed  [] Relevant notes:    GI/ I&O:  [x] Reviewed  [] Relevant notes:  [] Other:    SKIN:   per flow sheets:  mid sternal incision   coccyx stage IV    ESTIMATED NEEDS:  Based on: most recent weight 67.2kg  35-40kcal/kg 2352-2688kcal/day  1.5-2g/kg 100-134g protein/day  defer fluid needs to team    NUTRITION DIAGNOSIS:  [x] Prior Dx:  Inadequate energy intake,-->advanced as below Increased Nutrient Needs (protein-energy, micronutrients)  [x] New Dx: Moderate acute malnutrition related to not meeting EER as evidenced by weight loss >10% x 3 months, </= 75% EER x >/= 7 days.     EDUCATION:  [] Yes:  [x] Not appropriate/warranted    NUTRITION CARE PLAN:  1. Diet: Pt not meeting nutrient needs on current regimen. needs 24 hours feeds. 70ml/hr x 24 hours will provide a total volume of 1680ml, 2584kcal (38kcal/kg) and 124g protein (1.8g/kg)  -->Defer free water flush to team   -->Maintain strict aspiration precautions   2. Multivitamin/mineral supplementation: continue as ordered to aid in wound healing  3: Nutrition risk placed in chart     [] Achieved - Continue current nutrition intervention(s)  [] Current medical condition precludes nutrition intervention at this time.    MONITORING AND EVALUATION:   RD remains available upon request and will follow up per protocol.    Kaelyn Zhang, MS, RD, CDN / Teams NUTRITION FOLLOW UP NOTE    PATIENT SEEN FOR: follow up     SOURCE: [] Patient  [x] Current Medical Record  [x] RN  [] Family/support person at bedside  [x] Patient unavailable/inappropriate  [x] Other: NP    CHART REVIEWED/EVENTS NOTED.  [] No changes to nutrition care plan to note  [x] Nutrition Status:  -S/P CABG x 3  -ESRD on dialysis  -3/3 Swallow FEES Assessment Adult Recommendations: "Continue NPO w/ alternative means of nutrition/hydration/medication"    DIET ORDER:   Diet, NPO with Tube Feed:   Tube Feeding Modality: Nasogastric  [employer] Peptide 1.5 (KFPEPT1.5RTH)  Total Volume for 24 Hours (mL): 840  Continuous  Starting Tube Feed Rate {mL per Hour}: 20  Increase Tube Feed Rate by (mL): 10     Every hour  Until Goal Tube Feed Rate (mL per Hour): 70  Tube Feed Duration (in Hours): 12  Tube Feed Start Time: 18:00  Tube Feed Stop Time: 06:00    Start Time: 23:00 (25)    CURRENT DIET ORDER IS:  [] Appropriate:  [x] Inadequate: see below for recommendations  [] Other:    NUTRITION INTAKE/PROVISION:  [] PO:  [x] Enteral Nutrition: providing at 100% provision: 1292kcal and 62g protein. This is 64% EER x 9 days.  -->Changed from V[employer] to [employer] in setting or reports nausea/vomiting   [] Parenteral Nutrition:    ANTHROPOMETRICS:  Drug Dosing Weight  Height (cm): 180.3 (30 Dec 2024 12:01)  Weight (kg): 85.1 (30 Dec 2024 12:01)  BMI (kg/m2): 26.2 (30 Dec 2024 12:01)  BSA (m2): 2.05 (30 Dec 2024 12:01)  Weights:   Daily Weight in k.2 (148 pounds) () - bedscale and no edema noted, Weight in k.9 (), Weight in k.9 (), Weight in k.9 (), Weight in k.6 (184 pounds) () no edema noted  Pt refusing weights recently  Pt noted with a 36 pound / 19.5% loss since admission.   RD will continue to monitor trends.   BMI using most recent weight 20.7    MEDICATIONS:  MEDICATIONS  (STANDING):  apixaban 2.5 milliGRAM(s) Oral two times a day  ascorbic acid 500 milliGRAM(s) Oral daily  aspirin  chewable 81 milliGRAM(s) Oral daily  atorvastatin 80 milliGRAM(s) Oral at bedtime  buDESOnide    Inhalation Suspension 0.5 milliGRAM(s) Inhalation two times a day  chlorhexidine 2% Cloths 1 Application(s) Topical daily  chlorhexidine 4% Liquid 1 Application(s) Topical <User Schedule>  dextrose 50% Injectable 50 milliLiter(s) IV Push every 15 minutes  epoetin ignacia (EPOGEN) Injectable 55127 Unit(s) IV Push <User Schedule>  ferrous    sulfate Liquid 300 milliGRAM(s) Enteral Tube daily  insulin lispro (ADMELOG) corrective regimen sliding scale   SubCutaneous every 6 hours  melatonin 5 milliGRAM(s) Oral at bedtime  methocarbamol 500 milliGRAM(s) Oral every 8 hours  metoprolol tartrate 25 milliGRAM(s) Oral two times a day  mirtazapine 7.5 milliGRAM(s) Oral at bedtime  Nephro-elicia 1 Tablet(s) Oral daily  pantoprazole  Injectable 40 milliGRAM(s) IV Push daily  sodium chloride 0.65% Nasal 1 Spray(s) Both Nostrils every 12 hours  sodium chloride 0.9% for Nebulization 3 milliLiter(s) Nebulizer every 6 hours  sodium chloride 0.9%. 1000 milliLiter(s) (10 mL/Hr) IV Continuous <Continuous>  sodium zirconium cyclosilicate 10 Gram(s) Oral <User Schedule>  traZODone 100 milliGRAM(s) Oral at bedtime    MEDICATIONS  (PRN):  acetaminophen     Tablet .. 650 milliGRAM(s) Oral every 6 hours PRN Mild Pain (1 - 3)  HYDROmorphone  Injectable 0.5 milliGRAM(s) IV Push every 6 hours PRN Severe Pain (7 - 10)  lidocaine/prilocaine Cream 1 Application(s) Topical daily PRN pre-HD  sodium chloride 0.9% lock flush 10 milliLiter(s) IV Push every 1 hour PRN Pre/post blood products, medications, blood draw, and to maintain line patency    NUTRITIONALLY PERTINENT LABS:  03-10 Na138 mmol/L Glu 99 mg/dL K+ 3.9 mmol/L Cr  6.17 mg/dL[H] BUN 34 mg/dL[H] 03-10 Phos 5.8 mg/dL[H] 03-10 Alb 2.8 g/dL[L]03-10 ALT 11 U/L AST 21 U/L Alkaline Phosphatase 94 U/L    A1C with Estimated Average Glucose Result: 5.6 % (- @ 06:50)    Finger Sticks:  POCT Blood Glucose.: 110 mg/dL ( @ 00:01)  POCT Blood Glucose.: 74 mg/dL (03-10 @ 17:19)  POCT Blood Glucose.: 102 mg/dL (03-10 @ 12:28)    NUTRITIONALLY PERTINENT MEDICATIONS/LABS:  [x] Reviewed  [x] Relevant notes on medications/labs:  -magnesium and phosphorus elevated. noted to be on HD.     EDEMA:  [x] Reviewed  [] Relevant notes:    GI/ I&O:  [x] Reviewed  [] Relevant notes:  [] Other:    SKIN:   per flow sheets:  mid sternal incision   coccyx stage IV    ESTIMATED NEEDS:  Based on: most recent weight 67.2kg  35-40kcal/kg 2352-2688kcal/day  1.5-2g/kg 100-134g protein/day  defer fluid needs to team    NUTRITION DIAGNOSIS:  [x] Prior Dx:  Inadequate energy intake,-->advanced as below Increased Nutrient Needs (protein-energy, micronutrients)  [x] New Dx: Moderate acute malnutrition related to not meeting EER as evidenced by weight loss >10% x 3 months, </= 75% EER x >/= 7 days.     EDUCATION:  [] Yes:  [x] Not appropriate/warranted    NUTRITION CARE PLAN:  1. Diet: Pt not meeting nutrient needs on current regimen. needs 24 hours feeds. 70ml/hr x 24 hours will provide a total volume of 1680ml, 2584kcal (38kcal/kg) and 124g protein (1.8g/kg)  -->Defer free water flush to team   -->Maintain strict aspiration precautions   2. Multivitamin/mineral supplementation: continue as ordered to aid in wound healing  3: Nutrition risk placed in chart     [] Achieved - Continue current nutrition intervention(s)  [] Current medical condition precludes nutrition intervention at this time.    MONITORING AND EVALUATION:   RD remains available upon request and will follow up per protocol.    Kaelyn Zhang MS, RD, CDN / Teams

## 2025-03-11 NOTE — DIETITIAN NUTRITION RISK NOTIFICATION - TREATMENT: THE FOLLOWING DIET HAS BEEN RECOMMENDED
Diet, NPO with Tube Feed:   Tube Feeding Modality: Nasogastric  [employer] Peptide 1.5 (KFPEPT1.5RTH)  Total Volume for 24 Hours (mL): 840  Continuous  Starting Tube Feed Rate {mL per Hour}: 20  Increase Tube Feed Rate by (mL): 10     Every hour  Until Goal Tube Feed Rate (mL per Hour): 70  Tube Feed Duration (in Hours): 12  Tube Feed Start Time: 18:00  Tube Feed Stop Time: 06:00    Start Time: 23:00 (03-03-25 @ 07:57) [Active]       see nutrition chart note for recommendations

## 2025-03-11 NOTE — PROGRESS NOTE ADULT - ASSESSMENT
54M with PMH of HTN, HLD, ESRD on HD MWF via LUE AVF, ascites (requiring repeat paracenteses q4-6wks), NICM with moderate LV dysfunction w/ EF 35-40%(2023) and CAD s/p atherectomy + SALLIE to D1(4/11/2024) presents to [facility] transferred from [facility] for CABG eval  Nephro on board for HD needs.    ESRD on HD   Nephrologist Dr. Bailey  [facility] Schedule / Access:  LUE AVF  Center: [facility]  Pt s/p HD on 2/19 and 2/21.  s/p HD 2/24 uf 1.5L  S/p HD on 02/26 2 L UF and 02/28 2 L UF again   S/p HD on 03/03 and 03/5, 03/07  S/p HD on 03/11   HD tomm per cristin  Keep MAP>65  Renal Diet  HD consent in chart     Hyper/Hypokalemia  Pt intermittently Hyperkalemia  HD as scheduled  now improved  Low K diet.  C/W Lokelma 10gm on nonHD days.  Monitor K.    HTN  BP fluctuating; controlled.  UF w/ HD as BP permits.  Low sodium diet.  Management per ICU  Monitor BP     CKD MBD  Check PTH   Low PO4 diet.  Not on binder.  Monitor Ca , PO4 daily     Anemia  CRISTIANE with HD  Repeat iron studies.  Transfuse if hgb <7    CAD with multivessel disease  CTICU following  s/p CABG 12/30    Hypoxic respiratory failure   S/p tracheostomy 01/18 now on trach collar  Per surgery  S/p bronchoscopy, pulm following

## 2025-03-12 LAB
ALBUMIN SERPL ELPH-MCNC: 2.7 G/DL — LOW (ref 3.3–5)
ALP SERPL-CCNC: 101 U/L — SIGNIFICANT CHANGE UP (ref 40–120)
ALT FLD-CCNC: 8 U/L — LOW (ref 10–45)
ANION GAP SERPL CALC-SCNC: 16 MMOL/L — SIGNIFICANT CHANGE UP (ref 5–17)
AST SERPL-CCNC: 16 U/L — SIGNIFICANT CHANGE UP (ref 10–40)
BASOPHILS # BLD AUTO: 0.04 K/UL — SIGNIFICANT CHANGE UP (ref 0–0.2)
BASOPHILS NFR BLD AUTO: 0.4 % — SIGNIFICANT CHANGE UP (ref 0–2)
BILIRUB SERPL-MCNC: 0.4 MG/DL — SIGNIFICANT CHANGE UP (ref 0.2–1.2)
BUN SERPL-MCNC: 30 MG/DL — HIGH (ref 7–23)
CHLORIDE SERPL-SCNC: 97 MMOL/L — SIGNIFICANT CHANGE UP (ref 96–108)
CO2 SERPL-SCNC: 25 MMOL/L — SIGNIFICANT CHANGE UP (ref 22–31)
CREAT SERPL-MCNC: 5.55 MG/DL — HIGH (ref 0.5–1.3)
CULTURE RESULTS: SIGNIFICANT CHANGE UP
EGFR: 11 ML/MIN/1.73M2 — LOW
EGFR: 11 ML/MIN/1.73M2 — LOW
EOSINOPHIL # BLD AUTO: 0.77 K/UL — HIGH (ref 0–0.5)
EOSINOPHIL NFR BLD AUTO: 7 % — HIGH (ref 0–6)
GLUCOSE SERPL-MCNC: 126 MG/DL — HIGH (ref 70–99)
HCT VFR BLD CALC: 30.4 % — LOW (ref 39–50)
HGB BLD-MCNC: 9.8 G/DL — LOW (ref 13–17)
IMM GRANULOCYTES NFR BLD AUTO: 1.6 % — HIGH (ref 0–0.9)
LYMPHOCYTES # BLD AUTO: 1.05 K/UL — SIGNIFICANT CHANGE UP (ref 1–3.3)
LYMPHOCYTES # BLD AUTO: 9.5 % — LOW (ref 13–44)
MAGNESIUM SERPL-MCNC: 2.6 MG/DL — SIGNIFICANT CHANGE UP (ref 1.6–2.6)
MCHC RBC-ENTMCNC: 31.9 PG — SIGNIFICANT CHANGE UP (ref 27–34)
MCHC RBC-ENTMCNC: 32.2 G/DL — SIGNIFICANT CHANGE UP (ref 32–36)
MCV RBC AUTO: 99 FL — SIGNIFICANT CHANGE UP (ref 80–100)
MONOCYTES # BLD AUTO: 1.07 K/UL — HIGH (ref 0–0.9)
MONOCYTES NFR BLD AUTO: 9.7 % — SIGNIFICANT CHANGE UP (ref 2–14)
NEUTROPHILS # BLD AUTO: 7.94 K/UL — HIGH (ref 1.8–7.4)
NEUTROPHILS NFR BLD AUTO: 71.8 % — SIGNIFICANT CHANGE UP (ref 43–77)
NRBC BLD AUTO-RTO: 0 /100 WBCS — SIGNIFICANT CHANGE UP (ref 0–0)
PHOSPHATE SERPL-MCNC: 4.5 MG/DL — SIGNIFICANT CHANGE UP (ref 2.5–4.5)
PLATELET # BLD AUTO: 217 K/UL — SIGNIFICANT CHANGE UP (ref 150–400)
POTASSIUM SERPL-MCNC: 4 MMOL/L — SIGNIFICANT CHANGE UP (ref 3.5–5.3)
POTASSIUM SERPL-SCNC: 4 MMOL/L — SIGNIFICANT CHANGE UP (ref 3.5–5.3)
PROT SERPL-MCNC: 6.3 G/DL — SIGNIFICANT CHANGE UP (ref 6–8.3)
RBC # BLD: 3.07 M/UL — LOW (ref 4.2–5.8)
RBC # FLD: 19.5 % — HIGH (ref 10.3–14.5)
SODIUM SERPL-SCNC: 138 MMOL/L — SIGNIFICANT CHANGE UP (ref 135–145)
SPECIMEN SOURCE: SIGNIFICANT CHANGE UP
WBC # BLD: 11.05 K/UL — HIGH (ref 3.8–10.5)
WBC # FLD AUTO: 11.05 K/UL — HIGH (ref 3.8–10.5)

## 2025-03-12 RX ADMIN — Medication 40 MILLIGRAM(S): at 14:24

## 2025-03-12 RX ADMIN — METOPROLOL SUCCINATE 25 MILLIGRAM(S): 50 TABLET, EXTENDED RELEASE ORAL at 18:43

## 2025-03-12 RX ADMIN — Medication 5 MILLIGRAM(S): at 22:14

## 2025-03-12 RX ADMIN — Medication 0.5 MILLIGRAM(S): at 16:04

## 2025-03-12 RX ADMIN — METHOCARBAMOL 500 MILLIGRAM(S): 500 TABLET, FILM COATED ORAL at 05:24

## 2025-03-12 RX ADMIN — Medication 1 TABLET(S): at 13:43

## 2025-03-12 RX ADMIN — Medication 0.5 MILLIGRAM(S): at 08:52

## 2025-03-12 RX ADMIN — ATORVASTATIN CALCIUM 80 MILLIGRAM(S): 80 TABLET, FILM COATED ORAL at 22:14

## 2025-03-12 RX ADMIN — Medication 81 MILLIGRAM(S): at 13:44

## 2025-03-12 RX ADMIN — Medication 1 APPLICATION(S): at 21:46

## 2025-03-12 RX ADMIN — Medication 3 MILLILITER(S): at 18:42

## 2025-03-12 RX ADMIN — Medication 0.5 MILLIGRAM(S): at 15:43

## 2025-03-12 RX ADMIN — APIXABAN 2.5 MILLIGRAM(S): 2.5 TABLET, FILM COATED ORAL at 05:24

## 2025-03-12 RX ADMIN — EPOETIN ALFA 10000 UNIT(S): 10000 SOLUTION INTRAVENOUS; SUBCUTANEOUS at 09:57

## 2025-03-12 RX ADMIN — BUDESONIDE 0.5 MILLIGRAM(S): 0.25 SUSPENSION RESPIRATORY (INHALATION) at 18:43

## 2025-03-12 RX ADMIN — Medication 500 MILLIGRAM(S): at 13:43

## 2025-03-12 RX ADMIN — Medication 100 MILLIGRAM(S): at 22:15

## 2025-03-12 RX ADMIN — METHOCARBAMOL 500 MILLIGRAM(S): 500 TABLET, FILM COATED ORAL at 13:44

## 2025-03-12 RX ADMIN — Medication 300 MILLIGRAM(S): at 13:45

## 2025-03-12 RX ADMIN — MIRTAZAPINE 7.5 MILLIGRAM(S): 30 TABLET, FILM COATED ORAL at 22:14

## 2025-03-12 RX ADMIN — Medication 0.5 MILLIGRAM(S): at 09:22

## 2025-03-12 RX ADMIN — METHOCARBAMOL 500 MILLIGRAM(S): 500 TABLET, FILM COATED ORAL at 22:14

## 2025-03-12 RX ADMIN — METOPROLOL SUCCINATE 25 MILLIGRAM(S): 50 TABLET, EXTENDED RELEASE ORAL at 05:24

## 2025-03-12 RX ADMIN — APIXABAN 2.5 MILLIGRAM(S): 2.5 TABLET, FILM COATED ORAL at 18:43

## 2025-03-12 RX ADMIN — LIDOCAINE AND PRILOCAINE 1 APPLICATION(S): 25; 25 CREAM TOPICAL at 08:20

## 2025-03-12 NOTE — CONSULT NOTE ADULT - CONSULT REASON
Ascites
Medical Co-Management
Mucus plugging
ESRD on HD
GrAM NEGATIVE SEPSIS
L foot drop
Perirectal abscess
back abscess
bradycardia
Evaluate Rehabilitation Needs
painful elongated toenails
sacral wound
tracheostomy
Afib
trach eval
Diaphragmatic Paralysis
Wound Consult
r/o still Sd.
thoracentesis
trach
Diagnotic Paracentesis & Left Chest tube
sacral pain
cad

## 2025-03-12 NOTE — CONSULT NOTE ADULT - ASSESSMENT
Patient seen and evaluated   - toenails debrided x10 using sterile nippers  - all offending ingrowing nail boarders excised   - patient educated on proper foot care and importance of regular examinations   - recommend z flow boots at all times while in bed  - follow up as outpatient for routine care  - podiatry signing off at this time, reconsult as needed

## 2025-03-12 NOTE — PROGRESS NOTE ADULT - ASSESSMENT
54M with PMH of HTN, HLD, ESRD on HD MWF via LUE AVF, ascites (requiring repeat paracenteses q4-6wks), NICM with moderate LV dysfunction w/ EF 35-40%(2023) and CAD s/p atherectomy + SALLIE to D1(4/11/2024) presents to [facility] transferred from [facility] for CABG eval  Nephro on board for HD needs.    ESRD on HD   Nephrologist Dr. Bailey  [facility] Schedule / Access:  LUE AVF  Center: [facility]  Pt s/p HD on 2/19 and 2/21.  s/p HD 2/24 uf 1.5L  S/p HD on 02/26 2 L UF and 02/28 2 L UF again   S/p HD on 03/03 and 03/5, 03/07  S/p HD on 03/11   HD today   Keep MAP>65  Renal Diet  HD consent in chart   Pending labs since 03/10    Hyper/Hypokalemia  Pt intermittently Hyperkalemia  HD as scheduled  now improved  Low K diet.  C/W Lokelma 10gm on nonHD days.  Monitor K.    HTN  BP fluctuating; controlled.  UF w/ HD as BP permits.  Low sodium diet.  Management per ICU  Monitor BP     CKD MBD  Check PTH   Low PO4 diet.  Not on binder.  Monitor Ca , PO4 daily     Anemia  CRISTIANE with HD  Repeat iron studies.  Transfuse if hgb <7    CAD with multivessel disease  CTICU following  s/p CABG 12/30    Hypoxic respiratory failure   S/p tracheostomy 01/18 now on trach collar  Per surgery  S/p bronchoscopy, pulm following

## 2025-03-12 NOTE — CONSULT NOTE ADULT - CONSULT REQUESTED DATE/TIME
03-Jan-2025 19:06
10-Feb-2025 10:53
12-Mar-2025 16:58
24-Dec-2024 12:30
08-Jan-2025 12:51
09-Jan-2025 12:16
13-Feb-2025 11:44
24-Jan-2025 15:42
25-Dec-2024 17:34
07-Feb-2025 17:06
28-Jan-2025 17:27
01-Feb-2025 20:08
03-Jan-2025 12:19
08-Jan-2025 12:25
22-Jan-2025
22-Jan-2025 14:34
16-Jan-2025 13:08
10-Aquilino-2025 15:34
11-Jan-2025 12:21
24-Jan-2025 10:33
31-Jan-2025 15:44
10-Mar-2025 00:00
24-Dec-2024 06:00

## 2025-03-12 NOTE — PROGRESS NOTE ADULT - ASSESSMENT
54M with PMH of HTN, HLD, ESRD on HD MWF via LUE AVF, ascites (requiring repeat paracenteses q4-6wks), NICM with moderate LV dysfunction w/ EF 35-40%() and CAD s/p atherectomy + SALLIE to D1(2024) presents to [facility] transferred from [facility].  Cardiac cath which showed pLAD 70% ISR, mLCx 70%, D1 severe dz.   Patient now transferred to [facility] CTS Dr. Rivers for CABG eval      # CAD s/p NSTEMI  Hx CAD s/p PCI LAD   Presented with ADHF elevated troponins  Positive NST1-Cath- pLAD 70% ISR, mLCx 70%, D1 severe dz.   TTE EF 35% Severe TRWas on Plavix-p2y12- 321 been off Plavix since -POD#1-C3L free LIMA-LAD; SVG-Diag; GPU-upnlPR-Kzrqwmdhc on  Sinus rhythm,Amio for AF prophylaxis   Atrial Flutter on TC during day Ambulating  -Increased congestion on CXR had HD yesterday remains in AFl   -Atrial Flutter HFTC 40L/30% BP stable     -Atrial Flutter tolerating TC for HD MWF-consider DOAC   OOB Abd US moderate 4 quad ascites noted remains in Atrial Flutter~~70's   TC Atrial Flutter  -s/p Bronch/BAL remains in AFl  Trach downsized 7   -Tolerating TC AFl rate controlled On Heparin gtt transition to DOAC  -Tx to SDU on Eliquis Atrial Flutter  -VSS Atrial Flutter rate controlled on Eliquis for AC  -Atrial Flutter ENT follow up Taper O2 requirement on TC 30%  -Awake no dyspnea on TC      # Acute on Chronic Systolic Heart Failure now improved  EF-35% ,severe TR  Had HD post op  GDMT post op  LVEF improved post bypass   -TTE EF 40%,trace effusion  ECG c/w pericarditis-was on colchicine   01/15-TTE EF 55%  -TTE EF 60%   -Normal LV systolic function Moderate pericardial effusion  -On TC-HF and Vent support at nights   02/10-Vent HS with T Collar trial Ambulated Remains in AF  Repeat TTE Normal LV Systolic function Small Pericardial effusion decreased from 2/3        Vascular evaluated  AVGraft /?steal causing RV failure-'' Very low suspicion of steal Sd and AVF causing current clinical state. ''   VA Duplex Hemodialysis Access, Left (25 @ 13:35) >   Arterial flow volume of 1301 mL/m, and the venous flow volume of  1492 mL/m are consistent with a normally functioning dialysis access  fistula.      # Ascites  Hx chronic ascites  Has drainage q4-6 weeks  Last drained -4600mL  ? ascites related to severe TR  Had [id number]-[id number]-qn growth   CT Abdomen 01/10-Large volume ascites-  US abdomen--Small to moderate volume abdominal/pelvic ascites  US abdomen  Moderate ascites  US Abdomen -Moderate 4 quad ascites  US Abdomen Limited 03/10-Moderate ascites throughout abdomen, with large pelvic ascites.    < end of copied text >      # ESRD  Now on  HD-MWF

## 2025-03-12 NOTE — PROGRESS NOTE ADULT - SUBJECTIVE AND OBJECTIVE BOX
[facility]    Dr. Nica Styles     Office [phone] (9 am to 5 pm)  Service: [phone] (5pm to 9am)  Also Available on TEAMS      RENAL PROGRESS NOTE: DATE OF SERVICE 03-12-25 @ 08:55    Patient is a 54y old  Male who presents with a chief complaint of CAD s/p CABG (12 Mar 2025 06:06)      Patient seen and examined at bedside. No chest pain/sob    VITALS:  T(F): 96.8 (03-11-25 @ 18:46), Max: 98.5 (03-11-25 @ 09:19)  HR: 62 (03-12-25 @ 05:23)  BP: 142/62 (03-12-25 @ 05:23)  RR: 18 (03-12-25 @ 05:23)  SpO2: 97% (03-12-25 @ 05:23)  Wt(kg): --    03-11 @ 07:01  -  03-12 @ 07:00  --------------------------------------------------------  IN: 720 mL / OUT: 0 mL / NET: 720 mL          PHYSICAL EXAM:  Constitutional: NAD  Neck: No JVD  Respiratory: CTAB, no wheezes, rales or rhonchi  Cardiovascular: S1, S2, RRR  Gastrointestinal: BS+, soft, NT/ND  Extremities: No peripheral edema    Hospital Medications:   MEDICATIONS  (STANDING):  apixaban 2.5 milliGRAM(s) Oral two times a day  ascorbic acid 500 milliGRAM(s) Oral daily  aspirin  chewable 81 milliGRAM(s) Oral daily  atorvastatin 80 milliGRAM(s) Oral at bedtime  buDESOnide    Inhalation Suspension 0.5 milliGRAM(s) Inhalation two times a day  chlorhexidine 2% Cloths 1 Application(s) Topical daily  chlorhexidine 4% Liquid 1 Application(s) Topical <User Schedule>  dextrose 50% Injectable 50 milliLiter(s) IV Push every 15 minutes  epoetin ignacia (EPOGEN) Injectable 13955 Unit(s) IV Push <User Schedule>  ferrous    sulfate Liquid 300 milliGRAM(s) Enteral Tube daily  insulin lispro (ADMELOG) corrective regimen sliding scale   SubCutaneous every 6 hours  melatonin 5 milliGRAM(s) Oral at bedtime  methocarbamol 500 milliGRAM(s) Oral every 8 hours  metoprolol tartrate 25 milliGRAM(s) Oral two times a day  mirtazapine 7.5 milliGRAM(s) Oral at bedtime  Nephro-elicia 1 Tablet(s) Oral daily  pantoprazole  Injectable 40 milliGRAM(s) IV Push daily  sodium chloride 0.65% Nasal 1 Spray(s) Both Nostrils every 12 hours  sodium chloride 0.9% for Nebulization 3 milliLiter(s) Nebulizer every 6 hours  sodium chloride 0.9%. 1000 milliLiter(s) (10 mL/Hr) IV Continuous <Continuous>  sodium zirconium cyclosilicate 10 Gram(s) Oral <User Schedule>  traZODone 100 milliGRAM(s) Oral at bedtime      LABS:  03-10    138  |  95[L]  |  34[H]  ----------------------------<  99  3.9   |  24  |  6.17[H]    Ca    8.9      10 Mar 2025 12:41  Phos  5.8     03-10  Mg     2.7     03-10    TPro  6.4  /  Alb  2.8[L]  /  TBili  0.4  /  DBili      /  AST  21  /  ALT  11  /  AlkPhos  94  03-10    Creatinine Trend: 6.17 <--, 5.31 <--                                9.8    7.39  )-----------( 251      ( 10 Mar 2025 12:41 )             30.7     Urine Studies:  Urinalysis - [03-10-25 @ 12:41]      Color  / Appearance  / SG  / pH       Gluc 99 / Ketone   / Bili  / Urobili        Blood  / Protein  / Leuk Est  / Nitrite       RBC  / WBC  / Hyaline  / Gran  / Sq Epi  / Non Sq Epi  / Bacteria       Iron 29, TIBC 143, %sat 20      [12-19-24 @ 05:00]  Ferritin 904      [12-19-24 @ 05:00]  TSH 4.75      [12-24-24 @ 06:50]        RADIOLOGY & ADDITIONAL STUDIES:

## 2025-03-12 NOTE — PROGRESS NOTE ADULT - ASSESSMENT
55 yo M with multiple medical problems including CAD s/p PCI in 2024 s/p CABG x 3 on 24    1/: Intubated for hypoxia & left lung white out s/p awake bronch with removal of copious mucopurulent secretions  : s/p IABP insertion, sepsis/septic shock due to E coli   ESBL pneumonia, collapsed Left Lung, s/p multiple bronch    tracheostomy tube placement    VRE E. Faecium Bcx   +HSV PCR BAL   tissue cx from back abscess - Serratia/MRSA  - - intermittent bradycardia/junctional episodes with hypotension  2/3: off , off theophylline. iHD 1L UF  : bronch for Left lung collapse wound vac, 2decho w/ moderate pericardial effusion - similar as before, US abd: small - moderate ascites - monitor at this time.  Wound vac placed for sacral wounds  : iHD-1L UF  : bronch today off positive pressure   : concern for left food drop   2/10 : no acute issues - CXR shows improving left sided aeration, pt subjectively reports having difficulty while lying flat  : s/p Paracentesis 3.5 L removed, leukocytosis   : Ascites fluid PMN < 250 (less likely SBP)   sniff test yesterday showed paradoxical diaphragmatic motion    : mucus plugging but able to clear airway with aggressive pulmonary toileting.   : no vent requirement overnight, able to mobilize secretion with pulm toileting  hyperkalemia post HD - workup for possible recirculation underway   : On TC X 48 hours - ambulating well, no mucus plugging events  : HFTC x24hrs (40L 30%)  : iHD 1.5L UF  : iHD 2L  : trach downsized to 7, bronch  - cultures growing ESBL E Coli, sacral wound vac changed   3/6 Transfer to SDU, +PW isolated, +sacral wound vac in place, + Tube feeds running, next HD scheduled for tomorrow   3/7 VSS; Continue with current medication regimen.  Continue on TC 40% 02.  Plan for HD today.  Nocturnal feeding.  3/8  FEES 3/3 notable for poor secretion management evidenced unable to clear 2/2 weak cough B/l vocal cords appeared to be adducted at midline. ENT eval demonstrates  limited vocal cord abduction vs. questionable vocal cord paresis--no plan for ENT intervention at this time, No PMV . Local sacral wound care by PT.   3/9 HD tomorrow, US abd in am Tolerating pm feeds  3/10 US abd and HD today. CXR PA/LAT. Consider ENT to reassess VC paresis feasibility of decannulation   Disposition: Home PT / OT     3/11  suction prn.  OOB to chair.  Eliquis for a flutter.  ENT note appreciated  Maintain npo, S&S f/u  3/12 The pt is for HD today, plan on trach decannulation after dialysis. Suction x 1 this am for minimal secretions,

## 2025-03-12 NOTE — PROGRESS NOTE ADULT - SUBJECTIVE AND OBJECTIVE BOX
VITAL SIGNS    Telemetry:  aflutter 60    Vital Signs Last 24 Hrs  T(C): 36.5 (25 @ 09:45), Max: 36.5 (25 @ 09:45)  T(F): 97.7 (25 @ 09:45), Max: 97.7 (25 @ 09:45)  HR: 62 (25 @ 09:45) (61 - 81)  BP: 143/62 (25 @ 09:45) (111/51 - 143/62)  RR: 18 (25 @ 09:45) (18 - 18)  SpO2: 98% (25 @ 09:45) (94% - 98%)                    @ 07:01  -   @ 07:00  --------------------------------------------------------  IN: 720 mL / OUT: 0 mL / NET: 720 mL          Daily     Daily Weight in k (12 Mar 2025 09:45)            CAPILLARY BLOOD GLUCOSE      POCT Blood Glucose.: 117 mg/dL (12 Mar 2025 10:58)  POCT Blood Glucose.: 98 mg/dL (11 Mar 2025 23:59)  POCT Blood Glucose.: 118 mg/dL (11 Mar 2025 17:08)            Drains:      Pacing Wires        [  ]   Settings:                                  Isolated  [x  ]    Coumadin    [ ] YES          [x ]      NO  ,       eliquis                          PHYSICAL EXAM        Neurology: alert and oriented x 3, nonfocal, no gross deficits  CV : s1 s2 RRR  Trach site cdi  Sternal Wound :  CDI , Stable  Lungs: cta  Abdomen: soft, nontender, nondistended, positive bowel sounds, last bowel movement +                       :   esrd   Extremities:     trace edema   /  -   calve tenderness ,   , l leg inc cdi, pigmentation changes LE          acetaminophen     Tablet .. 650 milliGRAM(s) Oral every 6 hours PRN  apixaban 2.5 milliGRAM(s) Oral two times a day  ascorbic acid 500 milliGRAM(s) Oral daily  aspirin  chewable 81 milliGRAM(s) Oral daily  atorvastatin 80 milliGRAM(s) Oral at bedtime  buDESOnide    Inhalation Suspension 0.5 milliGRAM(s) Inhalation two times a day  chlorhexidine 2% Cloths 1 Application(s) Topical daily  chlorhexidine 4% Liquid 1 Application(s) Topical <User Schedule>  dextrose 50% Injectable 50 milliLiter(s) IV Push every 15 minutes  epoetin ignacia (EPOGEN) Injectable 39720 Unit(s) IV Push <User Schedule>  ferrous    sulfate Liquid 300 milliGRAM(s) Enteral Tube daily  HYDROmorphone  Injectable 0.5 milliGRAM(s) IV Push every 6 hours PRN  insulin lispro (ADMELOG) corrective regimen sliding scale   SubCutaneous every 6 hours  lidocaine/prilocaine Cream 1 Application(s) Topical daily PRN  melatonin 5 milliGRAM(s) Oral at bedtime  methocarbamol 500 milliGRAM(s) Oral every 8 hours  metoprolol tartrate 25 milliGRAM(s) Oral two times a day  mirtazapine 7.5 milliGRAM(s) Oral at bedtime  Nephro-elicia 1 Tablet(s) Oral daily  pantoprazole  Injectable 40 milliGRAM(s) IV Push daily  sodium chloride 0.65% Nasal 1 Spray(s) Both Nostrils every 12 hours  sodium chloride 0.9% for Nebulization 3 milliLiter(s) Nebulizer every 6 hours  sodium chloride 0.9% lock flush 10 milliLiter(s) IV Push every 1 hour PRN  sodium chloride 0.9%. 1000 milliLiter(s) IV Continuous <Continuous>  sodium zirconium cyclosilicate 10 Gram(s) Oral <User Schedule>  traZODone 100 milliGRAM(s) Oral at bedtime                    Physical Therapy Rec:   Home  [  ]   Home w/ PT  [  ]  Rehab  [  ]  Discussed with Cardiothoracic Team at AM rounds.

## 2025-03-12 NOTE — PROGRESS NOTE ADULT - SUBJECTIVE AND OBJECTIVE BOX
MR#[id number]  PATIENT NAME:RAAD CANO    DATE OF SERVICE: 03-12-25 @ 06:06  Patient was seen and examined by Solo Quick MD on    03-12-25 @ 06:06 .  Interim events noted.Consultant notes ,Labs,Telemetry reviewed by [facility] [id number] with PMH of HTN, HLD, ESRD on HD MWF via LUE AVF, ascites (requiring repeat paracenteses q4-6wks), NICM with moderate LV dysfunction w/ EF 35-40%(2023) and CAD s/p atherectomy + SALLIE to D1(4/11/2024) presents to [facility] transferred [facility].  Cardiac cath which showed pLAD 70% ISR, mLCx 70%, D1 severe dz.   Patient now transferred to [facility] CTS Dr. Rivers for CABG eval.     (24 Dec 2024 05:07)      INTERIM EVENTS:Patient seen at bedside ,interim events noted.  12/23 Cardiac cath  pLAD 70% ISR, mLCx 70%, D1 severe dz.   12/31-POD#1-C3L free LIMA-LAD; SVG-Diag; SVG-leftPL Awake OOB extubated Sinus rhythm on Dobutamine gtt  03/04-Awake Had FEES still NPO Atrial Flutter   03/06-Tx to SDU  03/07-Awake Atrial Flutter   03/08-Awake VSS Atrial Flutter still on NGT  03/09-Awake Atrial Flutter on TC 30% still has poor cough  03/10-Awake lying in bed Atrial Flutter on TC 30% for US Abdomen today  03/12-Still NPO Atrial Flutter no dyspnea seen by ENT        AMBULATION: Assisted[ ] Cane/walker[ ] Independent[ ]    MEDICATIONS  (STANDING):  apixaban 2.5 milliGRAM(s) Oral two times a day  ascorbic acid 500 milliGRAM(s) Oral daily  aspirin  chewable 81 milliGRAM(s) Oral daily  atorvastatin 80 milliGRAM(s) Oral at bedtime  buDESOnide    Inhalation Suspension 0.5 milliGRAM(s) Inhalation two times a day  chlorhexidine 2% Cloths 1 Application(s) Topical daily  chlorhexidine 4% Liquid 1 Application(s) Topical <User Schedule>  dextrose 50% Injectable 50 milliLiter(s) IV Push every 15 minutes  epoetin ignacia (EPOGEN) Injectable 43934 Unit(s) IV Push <User Schedule>  ferrous    sulfate Liquid 300 milliGRAM(s) Enteral Tube daily  insulin lispro (ADMELOG) corrective regimen sliding scale   SubCutaneous every 6 hours  melatonin 5 milliGRAM(s) Oral at bedtime  methocarbamol 500 milliGRAM(s) Oral every 8 hours  metoprolol tartrate 25 milliGRAM(s) Oral two times a day  mirtazapine 7.5 milliGRAM(s) Oral at bedtime  Nephro-elicia 1 Tablet(s) Oral daily  pantoprazole  Injectable 40 milliGRAM(s) IV Push daily  sodium chloride 0.65% Nasal 1 Spray(s) Both Nostrils every 12 hours  sodium chloride 0.9% for Nebulization 3 milliLiter(s) Nebulizer every 6 hours  sodium chloride 0.9%. 1000 milliLiter(s) (10 mL/Hr) IV Continuous <Continuous>  sodium zirconium cyclosilicate 10 Gram(s) Oral <User Schedule>  traZODone 100 milliGRAM(s) Oral at bedtime    MEDICATIONS  (PRN):  acetaminophen     Tablet .. 650 milliGRAM(s) Oral every 6 hours PRN Mild Pain (1 - 3)  HYDROmorphone  Injectable 0.5 milliGRAM(s) IV Push every 6 hours PRN Severe Pain (7 - 10)  lidocaine/prilocaine Cream 1 Application(s) Topical daily PRN pre-HD  sodium chloride 0.9% lock flush 10 milliLiter(s) IV Push every 1 hour PRN Pre/post blood products, medications, blood draw, and to maintain line patency            REVIEW OF SYSTEMS:  Constitutional: [ ] fever, [ ]weight loss,  [ ]fatigue [ ]weight gain  Eyes: [ ] visual changes  Respiratory: [ ]shortness of breath;  [ ] cough, [ ]wheezing, [ ]chills, [ ]hemoptysis  Cardiovascular: [ ] chest pain, [ ]palpitations, [ ]dizziness,  [ ]leg swelling[ ]orthopnea[ ]PND  Gastrointestinal: [ ] abdominal pain, [ ]nausea, [ ]vomiting,  [ ]diarrhea [ ]Constipation [ ]Melena  Genitourinary: [ ] dysuria, [ ] hematuria [ ]Vigil  Neurologic: [ ] headaches [ ] tremors[ ]weakness [ ]Paralysis Right[ ] Left[ ]  Skin: [ ] itching, [ ]burning, [ ] rashes  Endocrine: [ ] heat or cold intolerance  Musculoskeletal: [ ] joint pain or swelling; [ ] muscle, back, or extremity pain  Psychiatric: [ ] depression, [ ]anxiety, [ ]mood swings, or [ ]difficulty sleeping  Hematologic: [ ] easy bruising, [ ] bleeding gums    [ ] All remaining systems negative except as per above.   [ ]Unable to obtain.  [x] No change in ROS since admission      Vital Signs Last 24 Hrs  T(C): 36 (11 Mar 2025 18:46), Max: 36.9 (11 Mar 2025 09:19)  T(F): 96.8 (11 Mar 2025 18:46), Max: 98.5 (11 Mar 2025 09:19)  HR: 62 (12 Mar 2025 05:23) (61 - 81)  BP: 142/62 (12 Mar 2025 05:23) (111/51 - 142/62)  BP(mean): 89 (12 Mar 2025 05:23) (76 - 90)  RR: 18 (12 Mar 2025 05:23) (17 - 18)  SpO2: 97% (12 Mar 2025 05:23) (94% - 100%)    Parameters below as of 12 Mar 2025 05:23  Patient On (Oxygen Delivery Method): tracheostomy collar      I&O's Summary    10 Mar 2025 07:01  -  11 Mar 2025 07:00  --------------------------------------------------------  IN: 690 mL / OUT: 2501 mL / NET: -1811 mL    11 Mar 2025 07:01  -  12 Mar 2025 06:06  --------------------------------------------------------  IN: 720 mL / OUT: 0 mL / NET: 720 mL        PHYSICAL EXAM:  General: No acute distress BMI-25  HEENT: EOMI, PERRL  Neck: Supple, [ ] JVD  Lungs: Equal air entry bilaterally; [ ] rales [ ] wheezing [ ] rhonchi  Heart: Regular rate and rhythm; [x ] murmur   2/6 [ x] systolic [ ] diastolic [ ] radiation[ ] rubs [ ]  gallops  Abdomen: Nontender, bowel sounds present  Extremities: No clubbing, cyanosis, [ ] edema [ ]Pulses  equal and intact  Nervous system:  Alert & Oriented X3, no focal deficits  Psychiatric: Normal affect  Skin: No rashes or lesions    LABS:  03-10    138  |  95[L]  |  34[H]  ----------------------------<  99  3.9   |  24  |  6.17[H]    Ca    8.9      10 Mar 2025 12:41  Phos  5.8     03-10  Mg     2.7     03-10    TPro  6.4  /  Alb  2.8[L]  /  TBili  0.4  /  DBili  x   /  AST  21  /  ALT  11  /  AlkPhos  94  03-10    Creatinine Trend: 6.17<--, 5.31<--, 4.92<--, 3.99<--, 5.47<--, 4.89<--                        9.8    7.39  )-----------( 251      ( 10 Mar 2025 12:41 )             30.7

## 2025-03-12 NOTE — CONSULT NOTE ADULT - CONSULT REQUESTED BY NAME
CTU
ED provider
Tita
CTU
CVICU
Dr. Karan Rivers
Tita
CTICU
CTICU
CTU
Tita
Tita
Tita NICHOLS
Charla Leslie
Dr Karan Rivers
ICU
CTICU
CTICU
CTU
Dr. Kingsley
cardiology
primary
RN

## 2025-03-12 NOTE — CONSULT NOTE ADULT - SUBJECTIVE AND OBJECTIVE BOX
Patient is a 54y old  Male who presents with a chief complaint of CAD s/p CABG (12 Mar 2025 06:06)      HPI:  54M with PMH of HTN, HLD, ESRD on HD MWF via LUE AVF, ascites (requiring repeat paracenteses q4-6wks), NICM with moderate LV dysfunction w/ EF 35-40%(2023) and CAD s/p atherectomy + SALLIE to D1(4/11/2024) presents to [facility] transferred from [facility]. Patient initially presented to [facility] ED with shortness of breath. Of note he was recently admitted from 09/27-12/02 for acute cholecystitis s/p cholecystectomy. In [facility] ED, pt was hypoxic to 86% on RA, trop 1.7K, EKG no new changes, CXR fluid overload. Admitted for AHRF 2/2 fluid overload. Pt received HD on 12/18, 12/19, 12/20 and 12/22. DAPT was resumed, TTE showed improvement in LVEF to 40-45%. Stress test was abnormal with 1mm inferior STD, reversible ischemia in the inferior wall and fixed defects in the lateral, anterior and apical walls with post stress EF of 24%. Pt was then transferred to [facility] for cardiac cath which showed pLAD 70% ISR, mLCx 70%, D1 severe dz. Patient now transferred to [facility] CTS Dr. Rivers for CABG eval. Patient denies any current SOB or chest pain on admission. Otherwise denies headaches, abdominal pain, urinary or bowel changes, fevers, chills, N/V/D, sick contacts.  (24 Dec 2024 05:07)      PAST MEDICAL & SURGICAL HISTORY:  Type 2 diabetes mellitus      Hypertension      End stage renal disease  started HD 2/2019 T, Th, Sat via right chest permacath      Bone spur  right shoulder- hx of - sx done      Anemia      Injury of right wrist  hx of at age 14      History of hyperkalemia  before HD- K-6.2 - to repeat in am of sx, pt had HD today      S/P arthroscopy of right shoulder  2010      History of vascular access device  right chest permacath - 2/2019      H/O right wrist surgery  tendons repair - at age 14      AV fistula      H/O ventral hernia repair      S/P cholecystectomy          MEDICATIONS  (STANDING):  apixaban 2.5 milliGRAM(s) Oral two times a day  ascorbic acid 500 milliGRAM(s) Oral daily  aspirin  chewable 81 milliGRAM(s) Oral daily  atorvastatin 80 milliGRAM(s) Oral at bedtime  buDESOnide    Inhalation Suspension 0.5 milliGRAM(s) Inhalation two times a day  chlorhexidine 2% Cloths 1 Application(s) Topical daily  chlorhexidine 4% Liquid 1 Application(s) Topical <User Schedule>  dextrose 50% Injectable 50 milliLiter(s) IV Push every 15 minutes  epoetin ignacia (EPOGEN) Injectable 54875 Unit(s) IV Push <User Schedule>  ferrous    sulfate Liquid 300 milliGRAM(s) Enteral Tube daily  insulin lispro (ADMELOG) corrective regimen sliding scale   SubCutaneous every 6 hours  melatonin 5 milliGRAM(s) Oral at bedtime  methocarbamol 500 milliGRAM(s) Oral every 8 hours  metoprolol tartrate 25 milliGRAM(s) Oral two times a day  mirtazapine 7.5 milliGRAM(s) Oral at bedtime  Nephro-elicia 1 Tablet(s) Oral daily  pantoprazole  Injectable 40 milliGRAM(s) IV Push daily  sodium chloride 0.65% Nasal 1 Spray(s) Both Nostrils every 12 hours  sodium chloride 0.9% for Nebulization 3 milliLiter(s) Nebulizer every 6 hours  sodium chloride 0.9%. 1000 milliLiter(s) (10 mL/Hr) IV Continuous <Continuous>  sodium zirconium cyclosilicate 10 Gram(s) Oral <User Schedule>  traZODone 100 milliGRAM(s) Oral at bedtime    MEDICATIONS  (PRN):  acetaminophen     Tablet .. 650 milliGRAM(s) Oral every 6 hours PRN Mild Pain (1 - 3)  HYDROmorphone  Injectable 0.5 milliGRAM(s) IV Push every 6 hours PRN Severe Pain (7 - 10)  lidocaine/prilocaine Cream 1 Application(s) Topical daily PRN pre-HD  sodium chloride 0.9% lock flush 10 milliLiter(s) IV Push every 1 hour PRN Pre/post blood products, medications, blood draw, and to maintain line patency      Allergies    No Known Allergies    Intolerances        VITALS:    Vital Signs Last 24 Hrs  T(C): 36.3 (12 Mar 2025 13:15), Max: 36.6 (12 Mar 2025 11:00)  T(F): 97.4 (12 Mar 2025 13:15), Max: 97.9 (12 Mar 2025 11:00)  HR: 120 (12 Mar 2025 16:13) (61 - 120)  BP: 152/74 (12 Mar 2025 13:15) (111/51 - 169/60)  BP(mean): 93 (12 Mar 2025 09:45) (76 - 93)  RR: 18 (12 Mar 2025 13:15) (18 - 18)  SpO2: 99% (12 Mar 2025 16:13) (94% - 100%)    Parameters below as of 12 Mar 2025 16:13  Patient On (Oxygen Delivery Method): room air        LABS:                          9.8    11.05 )-----------( 217      ( 12 Mar 2025 09:58 )             30.4       03-12    138  |  97  |  30[H]  ----------------------------<  126[H]  4.0   |  25  |  5.55[H]    Ca    8.5      12 Mar 2025 09:58  Phos  4.5     03-12  Mg     2.6     03-12    TPro  6.3  /  Alb  2.7[L]  /  TBili  0.4  /  DBili  x   /  AST  16  /  ALT  8[L]  /  AlkPhos  101  03-12      CAPILLARY BLOOD GLUCOSE      POCT Blood Glucose.: 117 mg/dL (12 Mar 2025 10:58)  POCT Blood Glucose.: 98 mg/dL (11 Mar 2025 23:59)  POCT Blood Glucose.: 118 mg/dL (11 Mar 2025 17:08)          LOWER EXTREMITY PHYSICAL EXAM:    Vascular: DP/PT 0/4, B/L, CFT <3 seconds B/L, Temperature gradient WNL, B/L.   Neuro: Epicritic sensation decreased to the level of foot, B/L.  Musculoskeletal/Ortho: pain with palpation of toes bilat foot  Skin: toenails thickened elongated dystrophic with subungual debris x10

## 2025-03-13 PROCEDURE — 71045 X-RAY EXAM CHEST 1 VIEW: CPT | Mod: 26

## 2025-03-13 RX ADMIN — ATORVASTATIN CALCIUM 80 MILLIGRAM(S): 80 TABLET, FILM COATED ORAL at 21:40

## 2025-03-13 RX ADMIN — Medication 1 TABLET(S): at 14:44

## 2025-03-13 RX ADMIN — MIRTAZAPINE 7.5 MILLIGRAM(S): 30 TABLET, FILM COATED ORAL at 21:41

## 2025-03-13 RX ADMIN — Medication 100 MILLIGRAM(S): at 21:44

## 2025-03-13 RX ADMIN — Medication 500 MILLIGRAM(S): at 14:44

## 2025-03-13 RX ADMIN — Medication 0.5 MILLIGRAM(S): at 16:34

## 2025-03-13 RX ADMIN — Medication 5 MILLIGRAM(S): at 21:44

## 2025-03-13 RX ADMIN — SODIUM ZIRCONIUM CYCLOSILICATE 10 GRAM(S): 5 POWDER, FOR SUSPENSION ORAL at 06:06

## 2025-03-13 RX ADMIN — METHOCARBAMOL 500 MILLIGRAM(S): 500 TABLET, FILM COATED ORAL at 06:06

## 2025-03-13 RX ADMIN — METHOCARBAMOL 500 MILLIGRAM(S): 500 TABLET, FILM COATED ORAL at 21:40

## 2025-03-13 RX ADMIN — Medication 0.5 MILLIGRAM(S): at 23:00

## 2025-03-13 RX ADMIN — Medication 81 MILLIGRAM(S): at 14:45

## 2025-03-13 RX ADMIN — APIXABAN 2.5 MILLIGRAM(S): 2.5 TABLET, FILM COATED ORAL at 06:06

## 2025-03-13 RX ADMIN — BUDESONIDE 0.5 MILLIGRAM(S): 0.25 SUSPENSION RESPIRATORY (INHALATION) at 17:48

## 2025-03-13 RX ADMIN — METOPROLOL SUCCINATE 25 MILLIGRAM(S): 50 TABLET, EXTENDED RELEASE ORAL at 17:48

## 2025-03-13 RX ADMIN — Medication 0.5 MILLIGRAM(S): at 22:39

## 2025-03-13 RX ADMIN — APIXABAN 2.5 MILLIGRAM(S): 2.5 TABLET, FILM COATED ORAL at 17:49

## 2025-03-13 RX ADMIN — Medication 3 MILLILITER(S): at 17:48

## 2025-03-13 RX ADMIN — Medication 300 MILLIGRAM(S): at 14:45

## 2025-03-13 RX ADMIN — METOPROLOL SUCCINATE 25 MILLIGRAM(S): 50 TABLET, EXTENDED RELEASE ORAL at 06:06

## 2025-03-13 RX ADMIN — Medication 1 APPLICATION(S): at 12:30

## 2025-03-13 RX ADMIN — METHOCARBAMOL 500 MILLIGRAM(S): 500 TABLET, FILM COATED ORAL at 14:44

## 2025-03-13 RX ADMIN — Medication 1 APPLICATION(S): at 23:00

## 2025-03-13 RX ADMIN — Medication 0.5 MILLIGRAM(S): at 17:04

## 2025-03-13 RX ADMIN — Medication 40 MILLIGRAM(S): at 14:45

## 2025-03-13 RX ADMIN — Medication 1 SPRAY(S): at 06:06

## 2025-03-13 NOTE — PROGRESS NOTE ADULT - SUBJECTIVE AND OBJECTIVE BOX
[facility]    Dr. Nica Styles     Office [phone] (9 am to 5 pm)  Service: [phone] (5pm to 9am)  Also Available on TEAMS      RENAL PROGRESS NOTE: DATE OF SERVICE 03-13-25 @ 09:17    Patient is a 54y old  Male who presents with a chief complaint of CAD s/p CABG (12 Mar 2025 06:06)      Patient seen and examined at bedside. No chest pain/sob    VITALS:  T(F): 98 (03-13-25 @ 06:56), Max: 98.2 (03-12-25 @ 23:27)  HR: 64 (03-13-25 @ 06:56)  BP: 143/58 (03-13-25 @ 06:56)  RR: 18 (03-13-25 @ 06:56)  SpO2: 100% (03-13-25 @ 06:56)  Wt(kg): --    03-12 @ 07:01  -  03-13 @ 07:00  --------------------------------------------------------  IN: 600 mL / OUT: 2000 mL / NET: -1400 mL          PHYSICAL EXAM:  Constitutional: NAD  Neck: No JVD  Respiratory: CTAB, no wheezes, rales or rhonchi  Cardiovascular: S1, S2, RRR  Gastrointestinal: BS+, soft, NT/ND  Extremities: No peripheral edema    Hospital Medications:   MEDICATIONS  (STANDING):  apixaban 2.5 milliGRAM(s) Oral two times a day  ascorbic acid 500 milliGRAM(s) Oral daily  aspirin  chewable 81 milliGRAM(s) Oral daily  atorvastatin 80 milliGRAM(s) Oral at bedtime  buDESOnide    Inhalation Suspension 0.5 milliGRAM(s) Inhalation two times a day  chlorhexidine 2% Cloths 1 Application(s) Topical daily  chlorhexidine 4% Liquid 1 Application(s) Topical <User Schedule>  dextrose 50% Injectable 50 milliLiter(s) IV Push every 15 minutes  epoetin ignacia (EPOGEN) Injectable 43833 Unit(s) IV Push <User Schedule>  ferrous    sulfate Liquid 300 milliGRAM(s) Enteral Tube daily  insulin lispro (ADMELOG) corrective regimen sliding scale   SubCutaneous every 6 hours  melatonin 5 milliGRAM(s) Oral at bedtime  methocarbamol 500 milliGRAM(s) Oral every 8 hours  metoprolol tartrate 25 milliGRAM(s) Oral two times a day  mirtazapine 7.5 milliGRAM(s) Oral at bedtime  Nephro-elicia 1 Tablet(s) Oral daily  pantoprazole  Injectable 40 milliGRAM(s) IV Push daily  sodium chloride 0.65% Nasal 1 Spray(s) Both Nostrils every 12 hours  sodium chloride 0.9% for Nebulization 3 milliLiter(s) Nebulizer every 6 hours  sodium chloride 0.9%. 1000 milliLiter(s) (10 mL/Hr) IV Continuous <Continuous>  sodium zirconium cyclosilicate 10 Gram(s) Oral <User Schedule>  traZODone 100 milliGRAM(s) Oral at bedtime      LABS:  03-12    138  |  97  |  30[H]  ----------------------------<  126[H]  4.0   |  25  |  5.55[H]    Ca    8.5      12 Mar 2025 09:58  Phos  4.5     03-12  Mg     2.6     03-12    TPro  6.3  /  Alb  2.7[L]  /  TBili  0.4  /  DBili      /  AST  16  /  ALT  8[L]  /  AlkPhos  101  03-12    Creatinine Trend: 5.55 <--, 6.17 <--, 5.31 <--    Albumin: 2.7 g/dL (03-12 @ 09:58)  Phosphorus: 4.5 mg/dL (03-12 @ 09:58)                              9.8    11.05 )-----------( 217      ( 12 Mar 2025 09:58 )             30.4     Urine Studies:  Urinalysis - [03-12-25 @ 09:58]      Color  / Appearance  / SG  / pH       Gluc 126 / Ketone   / Bili  / Urobili        Blood  / Protein  / Leuk Est  / Nitrite       RBC  / WBC  / Hyaline  / Gran  / Sq Epi  / Non Sq Epi  / Bacteria       Iron 29, TIBC 143, %sat 20      [12-19-24 @ 05:00]  Ferritin 904      [12-19-24 @ 05:00]  TSH 4.75      [12-24-24 @ 06:50]        RADIOLOGY & ADDITIONAL STUDIES:

## 2025-03-13 NOTE — SWALLOW BEDSIDE ASSESSMENT ADULT - SWALLOW EVAL: ANTICIPATED DISCHARGE DISPOSITION
tbd pending FEES results
D/c plan TBD pending hospital course
Orthopedic
D/c plan TBD pending hospital course

## 2025-03-13 NOTE — PROGRESS NOTE ADULT - ASSESSMENT
ILLNESS EVALUATION                  5/24/2019     ALLERGIES:   Allergen Reactions   • Propoxyphene HIVES and Other (See Comments)   • Morphine Other (See Comments)     Severe headache, dilaudid works best for pain control     Current Outpatient Medications   Medication Sig Dispense Refill   • simvastatin (ZOCOR) 20 MG tablet TAKE 1 TABLET BY MOUTH NIGHTLY 90 tablet 1   • TEMazepam (RESTORIL) 15 MG capsule TAKE 2 CAPSULES BY MOUTH NIGHTLY 60 capsule 0   • clonazePAM (KLONOPIN) 1 MG tablet Take 1 tablet by mouth three times daily as needed. Collaborating/Supervising MD is Dr. Yg Cheema MD 90 tablet 0   • ondansetron (ZOFRAN) 8 MG tablet Take 4 tablets by mouth every 8 hours as needed.     • amitriptyline (ELAVIL) 150 MG tablet Take 1.5 tablets by mouth nightly. 135 tablet 0   • furosemide (LASIX) 20 MG tablet Take 1 tablet by mouth daily. 30 tablet 0   • pantoprazole (PROTONIX) 40 MG tablet TAKE 1 TABLET BY MOUTH DAILY. 90 tablet 4   • hydromorphone powder Powder Patient receives hydromorphone compound via pain pump from Dr. Mor Benoit     • Naloxegol Oxalate (MOVANTIK) 25 MG Tab Take 25 mg by mouth. - Oral     • ondansetron (ZOFRAN) 8 MG tablet Take 8 mg by mouth. - Oral     • albuterol 108 (90 Base) MCG/ACT inhaler Inhale 2 puffs into the lungs.     • calcipotriene-betamethasone dipropionate (TACLONEX) 0.005-0.064 % ointment Apply to the affected area once daily in PM as directed  Collaborating/Supervising MD is Dr. Yg Cheema MD     • carisoprodol (SOMA) 350 MG tablet TAKE 1 TABLET BY MOUTH 3 TIMES DAILY AS NEEDED FOR MUSCLE SPASMS     • SUMAtriptan (IMITREX) 6 MG/0.5ML injection solution Inject 6 mg into the skin.     • sumatriptan (IMITREX) 100 MG tablet TAKE 1 TABLET BY MOUTH AS NEEDED FOR MIGRAINE.     • topiramate (TOPAMAX) 50 MG tablet Take 50 mg by mouth.     • Valacyclovir HCl 1000 MG Tab TAKE 2 TABLETS BY MOUTH AT SYMPTOM ONSET THEN REPEAT 12 HOURS LATER     • doxycycline hyclate (VIBRAMYCIN)  100 MG capsule Take 1 capsule by mouth 2 times daily. Collaborating/Supervising MD is Dr. Yg Cheema MD 20 capsule 0   • ergocalciferol (DRISDOL) 18576 units capsule One tab twice a week for 3 months Collaborating/Supervising MD is Dr. Yg Cheema MD 24 capsule 0     No current facility-administered medications for this visit.      There is no problem list on file for this patient.    SUBJECT  Marissa Wilson is a 58 year old female who presents today, has been seeing [facility] and has lost 30 pounds over the course of 2 months. Since she has lost weight she is off oxygen and feels so much better. Will continue with weight loss efforts.  Needs refill of imitrex for migraines and zofran for nausea during the migraine. Migraines triggered from weather changes.     Review of Systems   Constitutional: Negative.    HENT: Negative.    Eyes: Negative.    Respiratory: Negative.    Cardiovascular: Negative.    Gastrointestinal: Negative.    Endocrine: Negative.    Genitourinary: Negative.    Musculoskeletal: Negative.    Allergic/Immunologic: Negative.    Neurological: Negative.    Hematological: Negative.    All other systems reviewed and are negative.      OBJECTIVE:  Visit Vitals  /72   Pulse 109   Temp 97.2 °F (36.2 °C)   Resp 20   Wt 107 kg (236 lb)   SpO2 94%   BMI 40.51 kg/m²     Physical Exam   Constitutional: She is oriented to person, place, and time. She appears well-developed and well-nourished.   HENT:   Head: Normocephalic.   Right Ear: External ear normal.   Left Ear: External ear normal.   Nose: Nose normal.   Mouth/Throat: Oropharynx is clear and moist.   Eyes: Pupils are equal, round, and reactive to light. Conjunctivae and EOM are normal.   Neck: Normal range of motion. Neck supple.   Cardiovascular: Normal rate, regular rhythm, normal heart sounds and intact distal pulses.   Pulmonary/Chest: Effort normal and breath sounds normal.   Abdominal: Soft. Bowel sounds are  normal.   Musculoskeletal: Normal range of motion.   Neurological: She is oriented to person, place, and time.   Skin: Skin is warm and dry. Capillary refill takes less than 2 seconds.   Nursing note and vitals reviewed.      ASSESSMENT/PLAN:  (G43.111) Intractable migraine with aura with status migrainosus  (primary encounter diagnosis)  Plan: daily allergy medicine  imitrex as needed    R11.0 nausea  Plan: zofran as needed  Instructed to call if the problem worsens or does not improve.  Schedule follow-up: in 3 month(s)       53 yo M with multiple medical problems including CAD s/p PCI in 2024 s/p CABG x 3 on 24    1/: Intubated for hypoxia & left lung white out s/p awake bronch with removal of copious mucopurulent secretions  : s/p IABP insertion, sepsis/septic shock due to E coli   ESBL pneumonia, collapsed Left Lung, s/p multiple bronch    tracheostomy tube placement    VRE E. Faecium Bcx   +HSV PCR BAL   tissue cx from back abscess - Serratia/MRSA  - - intermittent bradycardia/junctional episodes with hypotension  2/3: off , off theophylline. iHD 1L UF  : bronch for Left lung collapse wound vac, 2decho w/ moderate pericardial effusion - similar as before, US abd: small - moderate ascites - monitor at this time.  Wound vac placed for sacral wounds  : iHD-1L UF  : bronch today off positive pressure   : concern for left food drop   2/10 : no acute issues - CXR shows improving left sided aeration, pt subjectively reports having difficulty while lying flat  : s/p Paracentesis 3.5 L removed, leukocytosis   : Ascites fluid PMN < 250 (less likely SBP)   sniff test yesterday showed paradoxical diaphragmatic motion    : mucus plugging but able to clear airway with aggressive pulmonary toileting.   : no vent requirement overnight, able to mobilize secretion with pulm toileting  hyperkalemia post HD - workup for possible recirculation underway   : On TC X 48 hours - ambulating well, no mucus plugging events  : HFTC x24hrs (40L 30%)  : iHD 1.5L UF  : iHD 2L  : trach downsized to 7, bronch  - cultures growing ESBL E Coli, sacral wound vac changed   3/6 Transfer to SDU, +PW isolated, +sacral wound vac in place, + Tube feeds running, next HD scheduled for tomorrow   3/7 VSS; Continue with current medication regimen.  Continue on TC 40% 02.  Plan for HD today.  Nocturnal feeding.  3/8  FEES 3/3 notable for poor secretion management evidenced unable to clear 2/2 weak cough B/l vocal cords appeared to be adducted at midline. ENT eval demonstrates  limited vocal cord abduction vs. questionable vocal cord paresis--no plan for ENT intervention at this time, No PMV . Local sacral wound care by PT.   3/9 HD tomorrow, US abd in am Tolerating pm feeds  3/10 US abd and HD today. CXR PA/LAT. Consider ENT to reassess VC paresis feasibility of decannulation   Disposition: Home PT / OT     3/11  suction prn.  OOB to chair.  Eliquis for a flutter.  ENT note appreciated  Maintain npo, S&S f/u  3/12 The pt is for HD today, plan on trach decannulation after dialysis. Suction x 1 this am for minimal secretions,   3/13 VSS speech an dswall evaluation today labs with HD

## 2025-03-13 NOTE — PROGRESS NOTE ADULT - PROBLEM SELECTOR PLAN 6
NPO on Nocturnal enteral feeding via Nasogastric Tube Amanda Hooker 1.5 @ 70 cc / hr x 12 hours   ENT consult recalled to re-evaluate   speech and swallow eval 3/13

## 2025-03-13 NOTE — PROGRESS NOTE ADULT - SUBJECTIVE AND OBJECTIVE BOX
MR#[id number]  PATIENT NAME:RAAD CANO    DATE OF SERVICE: 03-13-25 @ 0630  Patient was seen and examined by Solo Quick MD on    03-13-25 @ 0630  Interim events noted.Consultant notes ,Labs,Telemetry reviewed by me       HOSPITAL COURSE: HPI:  54M with PMH of HTN, HLD, ESRD on HD MWF via LUE AVF, ascites (requiring repeat paracenteses q4-6wks), NICM with moderate LV dysfunction w/ EF 35-40%(2023) and CAD s/p atherectomy + SALLIE to D1(4/11/2024) presents to [facility] transferred from [facility]. Patient initially presented to [facility] ED with shortness of breath. Of note he was recently admitted from 09/27-12/02 for acute cholecystitis s/p cholecystectomy. In [facility] ED, pt was hypoxic to 86% on RA, trop 1.7K, EKG no new changes, CXR fluid overload. Admitted for AHRF 2/2 fluid overload. Pt received HD on 12/18, 12/19, 12/20 and 12/22. DAPT was resumed, TTE showed improvement in LVEF to 40-45%. Stress test was abnormal with 1mm inferior STD, reversible ischemia in the inferior wall and fixed defects in the lateral, anterior and apical walls with post stress EF of 24%. Pt was then transferred to [facility] for cardiac cath which showed pLAD 70% ISR, mLCx 70%, D1 severe dz. Patient now transferred to [facility] CTS Dr. Rivers for CABG eval. Patient denies any current SOB or chest pain on admission. Otherwise denies headaches, abdominal pain, urinary or bowel changes, fevers, chills, N/V/D, sick contacts.  (24 Dec 2024 05:07)      INTERIM EVENTS:Patient seen at bedside ,interim events noted.      PMH -reviewed admission note, no change since admission  HEART FAILURE: Acute[ ]Chronic[ ] Systolic[ ] Diastolic[ ] Combined Systolic and Diastolic[ ]  CAD[ ] CABG[ ] PCI[ ]  DEVICES[ ] PPM[ ] ICD[ ] ILR[ ]  ATRIAL FIBRILLATION[ ] Paroxysmal[ ] Permanent[ ] CHADS2-[  ]  GHASSAN[ ] CKD1[ ] CKD2[ ] CKD3[ ] CKD4[ ] ESRD[ ]  COPD[ ] HTN[ ]   DM[ ] Type1[ ] Type 2[ ]   CVA[ ] Paresis[ ]    AMBULATION: Assisted[ ] Cane/walker[ ] Independent[ ]    MEDICATIONS  (STANDING):  apixaban 2.5 milliGRAM(s) Oral two times a day  ascorbic acid 500 milliGRAM(s) Oral daily  aspirin  chewable 81 milliGRAM(s) Oral daily  atorvastatin 80 milliGRAM(s) Oral at bedtime  buDESOnide    Inhalation Suspension 0.5 milliGRAM(s) Inhalation two times a day  chlorhexidine 2% Cloths 1 Application(s) Topical daily  chlorhexidine 4% Liquid 1 Application(s) Topical <User Schedule>  dextrose 50% Injectable 50 milliLiter(s) IV Push every 15 minutes  epoetin ignacia (EPOGEN) Injectable 73565 Unit(s) IV Push <User Schedule>  ferrous    sulfate Liquid 300 milliGRAM(s) Enteral Tube daily  insulin lispro (ADMELOG) corrective regimen sliding scale   SubCutaneous every 6 hours  melatonin 5 milliGRAM(s) Oral at bedtime  methocarbamol 500 milliGRAM(s) Oral every 8 hours  metoprolol tartrate 25 milliGRAM(s) Oral two times a day  mirtazapine 7.5 milliGRAM(s) Oral at bedtime  Nephro-elicia 1 Tablet(s) Oral daily  pantoprazole  Injectable 40 milliGRAM(s) IV Push daily  sodium chloride 0.65% Nasal 1 Spray(s) Both Nostrils every 12 hours  sodium chloride 0.9% for Nebulization 3 milliLiter(s) Nebulizer every 6 hours  sodium chloride 0.9%. 1000 milliLiter(s) (10 mL/Hr) IV Continuous <Continuous>  sodium zirconium cyclosilicate 10 Gram(s) Oral <User Schedule>  traZODone 100 milliGRAM(s) Oral at bedtime    MEDICATIONS  (PRN):  acetaminophen     Tablet .. 650 milliGRAM(s) Oral every 6 hours PRN Mild Pain (1 - 3)  HYDROmorphone  Injectable 0.5 milliGRAM(s) IV Push every 6 hours PRN Severe Pain (7 - 10)  lidocaine/prilocaine Cream 1 Application(s) Topical daily PRN pre-HD  sodium chloride 0.9% lock flush 10 milliLiter(s) IV Push every 1 hour PRN Pre/post blood products, medications, blood draw, and to maintain line patency            REVIEW OF SYSTEMS:  Constitutional: [ ] fever, [ ]weight loss,  [ ]fatigue [ ]weight gain  Eyes: [ ] visual changes  Respiratory: [ ]shortness of breath;  [ ] cough, [ ]wheezing, [ ]chills, [ ]hemoptysis  Cardiovascular: [ ] chest pain, [ ]palpitations, [ ]dizziness,  [ ]leg swelling[ ]orthopnea[ ]PND  Gastrointestinal: [ ] abdominal pain, [ ]nausea, [ ]vomiting,  [ ]diarrhea [ ]Constipation [ ]Melena  Genitourinary: [ ] dysuria, [ ] hematuria [ ]Vigil  Neurologic: [ ] headaches [ ] tremors[ ]weakness [ ]Paralysis Right[ ] Left[ ]  Skin: [ ] itching, [ ]burning, [ ] rashes  Endocrine: [ ] heat or cold intolerance  Musculoskeletal: [ ] joint pain or swelling; [ ] muscle, back, or extremity pain  Psychiatric: [ ] depression, [ ]anxiety, [ ]mood swings, or [ ]difficulty sleeping  Hematologic: [ ] easy bruising, [ ] bleeding gums    [ ] All remaining systems negative except as per above.   [ ]Unable to obtain.  [x] No change in ROS since admission      Vital Signs Last 24 Hrs  T(C): 36.7 (13 Mar 2025 19:22), Max: 36.8 (12 Mar 2025 23:27)  T(F): 98.1 (13 Mar 2025 19:22), Max: 98.2 (12 Mar 2025 23:27)  HR: 78 (13 Mar 2025 19:22) (62 - 87)  BP: 106/55 (13 Mar 2025 19:22) (106/55 - 148/67)  BP(mean): 73 (13 Mar 2025 19:22) (73 - 109)  RR: 18 (13 Mar 2025 19:22) (18 - 18)  SpO2: 91% (13 Mar 2025 19:22) (90% - 100%)    Parameters below as of 13 Mar 2025 19:22  Patient On (Oxygen Delivery Method): nasal cannula      I&O's Summary    12 Mar 2025 07:01  -  13 Mar 2025 07:00  --------------------------------------------------------  IN: 600 mL / OUT: 2000 mL / NET: -1400 mL    13 Mar 2025 07:01  -  13 Mar 2025 21:47  --------------------------------------------------------  IN: 240 mL / OUT: 0 mL / NET: 240 mL        PHYSICAL EXAM:  General: No acute distress BMI-  HEENT: EOMI, PERRL  Neck: Supple, [ ] JVD  Lungs: Equal air entry bilaterally; [ ] rales [ ] wheezing [ ] rhonchi  Heart: Regular rate and rhythm; [x ] murmur   2/6 [ x] systolic [ ] diastolic [ ] radiation[ ] rubs [ ]  gallops  Abdomen: Nontender, bowel sounds present  Extremities: No clubbing, cyanosis, [ ] edema [ ]Pulses  equal and intact  Nervous system:  Alert & Oriented X3, no focal deficits  Psychiatric: Normal affect  Skin: No rashes or lesions    LABS:  03-12    138  |  97  |  30[H]  ----------------------------<  126[H]  4.0   |  25  |  5.55[H]    Ca    8.5      12 Mar 2025 09:58  Phos  4.5     03-12  Mg     2.6     03-12    TPro  6.3  /  Alb  2.7[L]  /  TBili  0.4  /  DBili  x   /  AST  16  /  ALT  8[L]  /  AlkPhos  101  03-12    Creatinine Trend: 5.55<--, 6.17<--, 5.31<--, 4.92<--, 3.99<--, 5.47<--                        9.8    11.05 )-----------( 217      ( 12 Mar 2025 09:58 )             30.4

## 2025-03-13 NOTE — SWALLOW BEDSIDE ASSESSMENT ADULT - SWALLOW EVAL: DIAGNOSIS
54y M w/ complicated hospital course that includes s/p C3L on 12/30, multiple intubations x3, cardiogenic shock requiring IABP, s/p trach 1/16, cardiac arrest, back/sacral abscess s/p debridement, multiple infections, L hemidiaphragm dysfunction 2/13 s/p L robotic VATS diapharagm plication, and s/p decannulation 3/12. Oral motor exam is WNL; functional strengeth/ROM. Pt is able to trigger a volitional swallow when prompted, however, c/o pain on L-side of throat when swallowing. PO trials withheld due to ENT reports of aspiration of secretions on laryngoscopy. FEES is recommended to further assess the swallow mechanism and r/o aspiration.
54y M w/ complicated hospital course that includes s/p C3L on 12/30, multiple intubations x3, cardiogenic shock requiring IABP, s/p trach 1/16, cardiac arrest, back/sacral abscess s/p debridement, multiple infections, and L hemidiaphragm dysfunction 2/13 s/p L robotic VATS diapharagm plication. Oral motor exam is WNL; functional strengeth/ROM. PO trials withheld iso trach which places Pt at high risk for silent aspiration. Pt is able to trigger a volitional swallow when prompted. FEES is recommended to further assess the swallow mechanism and r/o aspiration.
55 yo M s/p CABG presents with signs suggestive of a pharyngeal dysphagia and mild dysphonia. Coughing on thin liquids c/f aspiration. Multiple swallows on conservative textures c/f pharyngeal stasis vs laryngeal penetration. Pt c/o hoarse, "scratchy" vocal quality and mild sore throat (2/10 discomfort). Dysphagia, dysphonia, throat discomfort likely due to intubation. FEES is recommended to determine tolerance for least restrictive diet. Ice chips after oral care may be considered for comfort and to prevent further deconditioning in the interim.

## 2025-03-13 NOTE — SWALLOW BEDSIDE ASSESSMENT ADULT - ASPIRATION PRECAUTIONS
for enteral feeds/yes
Strict aspiration and reflux precautions for secretions and tube feeds!/yes
Strict aspiration and reflux precautions for secretions and tube feeds!/yes

## 2025-03-13 NOTE — SWALLOW BEDSIDE ASSESSMENT ADULT - SLP GENERAL OBSERVATIONS
Encountered Pt sitting OOB to chair on 40% FiO2 via TC. #7 Portex cuffless trach appreciated. Scant, clear secretions noted at hub of trach. Pt is alert, verbally responsive (voicing over trach), and able to follow commands for the purpose of this exam. Encountered Pt sitting OOB to chair RA. Stoma site covered w/ gauze. Pt is A&Ox4, verbally responsive w/ adequate vocal volume, and able to follow commands. Intermittent throat clears/cough noted at baseline.

## 2025-03-13 NOTE — SWALLOW BEDSIDE ASSESSMENT ADULT - NS ASR SWALLOW FINDINGS DISCUS
Covering RN Chrissy, MD Kingsley, and NP [place]/Nursing/Patient
MD Kingsley; JAX Vieira; RN/Physician/Nursing/Patient
RN [place] and NP Tarah/Nursing/Patient

## 2025-03-13 NOTE — PROGRESS NOTE ADULT - PROBLEM SELECTOR PLAN 1
Continue with ASA 81 mg PO Daily.   Continue with Lopressor 25 mg PO BID   Continue with Lipitor 80 mg PO HS  Maintain + PW isolated  Decannulated 3/11  Increase activity as tolerated.   Encourage Chest PT / Pulmonary toileting and Incentive spirometry every 1 hour x 10 while awake.   Continue with Protonix 40 mg IV daily for PUD prophylaxis.   Sternal precautions and wound care  Daily Shower   D/C plan home with PT/OT once medically cleared   Plan of care discussed with attending

## 2025-03-13 NOTE — SWALLOW BEDSIDE ASSESSMENT ADULT - SPECIFY REASON(S)
to assess swallow safety/function and r/o dysphagia
to subjectively re-assess the swallow mechanism and r/o dysphagia; Pt decannulated 3/12
to assess the swallow mechanism; r/o dysphagia.

## 2025-03-13 NOTE — SWALLOW BEDSIDE ASSESSMENT ADULT - COMMENTS
+ESRD on HD. Multi-CAD s/p C3L on 12/30/24. Extubated (12/31) after x1 day. Post-op, patient developed worsening cardiogenic shock requiring inotropic support and an IABP; reintubated 1/2-1/8. Extubated to [facility]. Bedside swallow evaluation and FEES performed 1/13. 1/15 Pt w/ respiratory distress and cardiac arrest requiring intubation. S/p trach 1/16. No PMV evaluation/placement per MD. No plans for swallow evaluation, SLP signed off 1/31. +New abscess in right buttocks and coccygeal area, s/p I&D of back abscess and sacral decub 2/1. Wound vac in place. L chest tube placed for pleural effusion. ID following throughout hospital course for multiple infections. EP following for intermittent bradycardia w/ hypotension, dobutabmine re-initiated. CXR w/ L lung collapse s/p bronch 2/4. Neuro consulted for L foot drop and notes "Presentation appears to peripheral in etiology given the involvement of multiple nerves." CTSx consulted due to concern for diaphragmatic paralysis;+L hemidiaphragm dysfunction. S/p paracentesis 2/11. 2/13 s/p Left robotic VATS Diapharagm plication. Per ENT 3/10, +aspiration of secretions, limited vocal cord abduction b/l; tracheoscopy WNL. Pt decannulated 3/12.     ***Seen for initial bedside swallow evaluation 1/11/25 with c/f pharyngeal dysphagia and mild dysphonia. NPO, with non-oral nutrition/hydration/medications. FEES 1/13 notable for "pooling and penetration of frothy secretions are noted. Secretions are not cleared with use of ice chips. There is reduced airway closure and reduced supra/subglottic sensation contributing to laryngeal penetration and aspiration of purees, thickened liquids and ice chips." Reduced movement of the left vocal cord is noted. SLP reconsulted for swallowing. FEES 3/3 notable for aspiration of secretions and b/l vocal cords w/ limited abduction. SLP signed off 3/4 given Pt is not a candidate for PMV or swallow intervention. +ESRD on HD. Multi-CAD s/p C3L on 12/30/24. Extubated (12/31) after x1 day. Post-op, patient developed worsening cardiogenic shock requiring inotropic support and an IABP; reintubated 1/2-1/8. Extubated to [facility]. Bedside swallow evaluation and FEES performed 1/13. 1/15 Pt w/ respiratory distress and cardiac arrest requiring intubation. S/p trach 1/16. No PMV evaluation/placement per MD. No plans for swallow evaluation, SLP signed off 1/31. +New abscess in right buttocks and coccygeal area, s/p I&D of back abscess and sacral decub 2/1. Wound vac in place. L chest tube placed for pleural effusion. ID following throughout hospital course for multiple infections. EP following for intermittent bradycardia w/ hypotension, dobutabmine re-initiated. CXR w/ L lung collapse s/p bronch 2/4. Neuro consulted for L foot drop and notes "Presentation appears to peripheral in etiology given the involvement of multiple nerves." CTSx consulted due to concern for diaphragmatic paralysis;+L hemidiaphragm dysfunction. S/p paracentesis 2/11. 2/13 s/p Left robotic VATS Diaphragm plication. Per ENT 3/10, +aspiration of secretions, limited vocal cord abduction b/l; tracheoscopy WNL. Pt decannulated 3/12.     ***Seen for initial bedside swallow evaluation 1/11/25 with c/f pharyngeal dysphagia and mild dysphonia. NPO, with non-oral nutrition/hydration/medications. FEES 1/13 notable for "pooling and penetration of frothy secretions are noted. Secretions are not cleared with use of ice chips. There is reduced airway closure and reduced supra/subglottic sensation contributing to laryngeal penetration and aspiration of purees, thickened liquids and ice chips." Reduced movement of the left vocal cord is noted. SLP reconsulted for swallowing. FEES 3/3 notable for aspiration of secretions and b/l vocal cords w/ limited abduction. No PO trials administered. SLP signed off 3/4 given Pt is not a candidate for PMV or swallow intervention.

## 2025-03-13 NOTE — SWALLOW BEDSIDE ASSESSMENT ADULT - SWALLOW EVAL: RECOMMENDED DIET
Continue NPO w/ NGT
Continue NPO w/ alternative means of nutrition/hydration/medication
NPO, with non-oral nutrition/hydration/medications. allow ice chips sparingly after oral care

## 2025-03-13 NOTE — PROGRESS NOTE ADULT - SUBJECTIVE AND OBJECTIVE BOX
subjective ' hello"    VITAL SIGNS    Telemetry:  Aflutter    Vital Signs Last 24 Hrs  T(C): 36.7 (25 @ 06:56), Max: 36.8 (25 @ 23:27)  T(F): 98 (25 @ 06:56), Max: 98.2 (25 @ 23:27)  HR: 64 (25 @ 06:56) (62 - 120)  BP: 143/58 (25 @ 06:56) (127/65 - 152/74)  RR: 18 (25 @ 06:56) (18 - 18)  SpO2: 100% (25 @ 06:56) (90% - 100%)            @ 07:01  -   @ 07:00  --------------------------------------------------------  IN: 600 mL / OUT: 2000 mL / NET: -1400 mL  Daily Weight in k (12 Mar 2025 13:15)                            9.8    11.05 )-----------( 217      ( 12 Mar 2025 09:58 )             30.4           138  |  97  |  30[H]  ----------------------------<  126[H]  4.0   |  25  |  5.55[H]    Ca    8.5      12 Mar 2025 09:58  Phos  4.5     12  Mg     2.6         TPro  6.3  /  Alb  2.7[L]  /  TBili  0.4  /  DBili  x   /  AST  16  /  ALT  8[L]  /  AlkPhos  101  12    MEDICATIONS  (STANDING):  apixaban 2.5 milliGRAM(s) Oral two times a day  ascorbic acid 500 milliGRAM(s) Oral daily  aspirin  chewable 81 milliGRAM(s) Oral daily  atorvastatin 80 milliGRAM(s) Oral at bedtime  buDESOnide    Inhalation Suspension 0.5 milliGRAM(s) Inhalation two times a day  chlorhexidine 2% Cloths 1 Application(s) Topical daily  chlorhexidine 4% Liquid 1 Application(s) Topical <User Schedule>  dextrose 50% Injectable 50 milliLiter(s) IV Push every 15 minutes  epoetin ignacia (EPOGEN) Injectable 25335 Unit(s) IV Push <User Schedule>  ferrous    sulfate Liquid 300 milliGRAM(s) Enteral Tube daily  insulin lispro (ADMELOG) corrective regimen sliding scale   SubCutaneous every 6 hours  melatonin 5 milliGRAM(s) Oral at bedtime  methocarbamol 500 milliGRAM(s) Oral every 8 hours  metoprolol tartrate 25 milliGRAM(s) Oral two times a day  mirtazapine 7.5 milliGRAM(s) Oral at bedtime  Nephro-elicia 1 Tablet(s) Oral daily  pantoprazole  Injectable 40 milliGRAM(s) IV Push daily  sodium chloride 0.65% Nasal 1 Spray(s) Both Nostrils every 12 hours  sodium chloride 0.9% for Nebulization 3 milliLiter(s) Nebulizer every 6 hours  sodium chloride 0.9%. 1000 milliLiter(s) (10 mL/Hr) IV Continuous <Continuous>  sodium zirconium cyclosilicate 10 Gram(s) Oral <User Schedule>  traZODone 100 milliGRAM(s) Oral at bedtime    MEDICATIONS  (PRN):  acetaminophen     Tablet .. 650 milliGRAM(s) Oral every 6 hours PRN Mild Pain (1 - 3)  HYDROmorphone  Injectable 0.5 milliGRAM(s) IV Push every 6 hours PRN Severe Pain (7 - 10)  lidocaine/prilocaine Cream 1 Application(s) Topical daily PRN pre-HD  sodium chloride 0.9% lock flush 10 milliLiter(s) IV Push every 1 hour PRN Pre/post blood products, medications, blood draw, and to maintain line patency          CAPILLARY BLOOD GLUCOSE      POCT Blood Glucose.: 94 mg/dL (12 Mar 2025 18:09)               PHYSICAL EXAM    Neurology: alert and oriented x 3, nonfocal, no gross deficits    CV : [id number]    Sternal Wound : FERDINAND sternum  Stable    Lungs: Trach stoma healing qwell B/l easy breath sounds     Abdomen:  Tavares feed soft, nontender, nondistended, positive bowel sounds, + Bm  :   ESRD HD            Extremities:  warm well perfused    equal strength  throughout    B/ll e+ DP no edema no calftenderness                                      Physical Therapy Rec:   Home  [  ]   Home w/ PT  [  ]  Rehab  [ x ]    Discussed with Cardiothoracic Team at AM rounds.

## 2025-03-13 NOTE — PROGRESS NOTE ADULT - ASSESSMENT
54M with PMH of HTN, HLD, ESRD on HD MWF via LUE AVF, ascites (requiring repeat paracenteses q4-6wks), NICM with moderate LV dysfunction w/ EF 35-40%(2023) and CAD s/p atherectomy + SALLIE to D1(4/11/2024) presents to [facility] transferred from [facility] for CABG eval  Nephro on board for HD needs.    ESRD on HD   Nephrologist Dr. Bailey  [facility] Schedule / Access:  LUE AVF  Center: [facility]  Pt s/p HD on 2/19 and 2/21.  s/p HD 2/24 uf 1.5L  S/p HD on 02/26 2 L UF and 02/28 2 L UF again   S/p HD on 03/03 and 03/5, 03/07  S/p HD on 03/10 AND 03/12  HD tomm per cristin  Keep MAP>65  Renal Diet  HD consent in chart   Pending labs since 03/10    Hyper/Hypokalemia  Pt intermittently Hyperkalemia  HD as scheduled  now improved  Low K diet.  C/W Lokelma 10gm on nonHD days.  Monitor K.    HTN  BP fluctuating; controlled.  UF w/ HD as BP permits.  Low sodium diet.  Management per CTICU  Monitor BP     CKD MBD  Check PTH   Low PO4 diet.  Not on binder.  Monitor Ca , PO4 daily     Anemia  CRISTIANE with HD  Repeat iron studies.  Transfuse if hgb <7    CAD with multivessel disease  CTICU following  s/p CABG 12/30    Hypoxic respiratory failure   S/p tracheostomy 01/18 now on trach collar  Per surgery  S/p bronchoscopy, pulm following

## 2025-03-13 NOTE — SWALLOW BEDSIDE ASSESSMENT ADULT - SWALLOW EVAL: SECRETION MANAGEMENT
WNL; scant clear secretions noted at hub of trach
Intermittent throat clears/cough noted at baseline; Pt endorses independently coughing and expectorating secretions (reports clear color)--not observed during this encounter
adequate

## 2025-03-13 NOTE — SWALLOW BEDSIDE ASSESSMENT ADULT - SLP PERTINENT HISTORY OF CURRENT PROBLEM
54y M with PMHx of HTN, HLD, ESRD on HD MWF via LUE AVF, ascites (requiring repeat paracenteses q4-6wks), NICM with moderate LV dysfunction w/ EF 35-40%(2023) and CAD s/p atherectomy + SALLIE to D1(4/11/2024) presents to [facility] transferred from [facility]. Patient initially presented to [facility] ED with SOB. Of note Pt recently admitted from 09/27-12/02 for acute cholecystitis s/p cholecystectomy. In [facility] ED, pt was hypoxic to 86% on RA, trop 1.7K, EKG no new changes, CXR fluid overload. Admitted for AHRF 2/2 fluid overload. Pt received HD. DAPT was resumed, TTE showed improvement in LVEF to 40-45%. Stress test was abnormal with 1mm inferior STD, reversible ischemia in the inferior wall and fixed defects in the lateral, anterior and apical walls with post stress EF of 24%. Pt was then transferred to [facility] for cardiac cath which showed pLAD 70% ISR, mLCx 70%, D1 severe dz. Transferred to [facility] 12/24 for CABG w/ CTS Dr. Rivers.
54M with PMH of HTN, HLD, ESRD on HD MWF via LUE AVF, ascites (requiring repeat paracenteses q4-6wks), NICM with moderate LV dysfunction w/ EF 35-40%(2023) and CAD s/p atherectomy + SALLIE to D1(4/11/2024) presents to [facility] transferred from [facility]. Patient initially presented to [facility] ED with shortness of breath. Of note he was recently admitted from 09/27-12/02 for acute cholecystitis s/p cholecystectomy. In [facility] ED, pt was hypoxic to 86% on RA, trop 1.7K, EKG no new changes, CXR fluid overload. Admitted for AHRF 2/2 fluid overload. Pt received HD on 12/18, 12/19, 12/20 and 12/22. DAPT was resumed, TTE showed improvement in LVEF to 40-45%. Stress test was abnormal with 1mm inferior STD, reversible ischemia in the inferior wall and fixed defects in the lateral, anterior and apical walls with post stress EF of 24%. Pt was then transferred to [facility] for cardiac cath which showed pLAD 70% ISR, mLCx 70%, D1 severe dz. Patient now transferred to [facility] CTS Dr. Rivers for CABG
54y M with PMHx of HTN, HLD, ESRD on HD MWF via LUE AVF, ascites (requiring repeat paracenteses q4-6wks), NICM with moderate LV dysfunction w/ EF 35-40%(2023) and CAD s/p atherectomy + SALLIE to D1(4/11/2024) presents to [facility] transferred from [facility]. Patient initially presented to [facility] ED with SOB. Of note Pt recently admitted from 09/27-12/02 for acute cholecystitis s/p cholecystectomy. At [facility] ED, Pt admitted for AHRF 2/2 fluid overload. Pt received HD. DAPT was resumed, TTE showed improvement in LVEF to 40-45%. Stress test was abnormal with 1mm inferior STD, reversible ischemia in the inferior wall and fixed defects in the lateral, anterior and apical walls with post stress EF of 24%. Pt was then transferred to [facility] for cardiac cath which showed pLAD 70% ISR, mLCx 70%, D1 severe dz. Transferred to [facility] 12/24 for CABG w/ CTS Dr. Rivers.

## 2025-03-13 NOTE — SWALLOW BEDSIDE ASSESSMENT ADULT - ASR SWALLOW RECOMMEND DIAG
to objectively assess the swallow mechanism and r/o aspiration/FEES
to objectively assess the swallow mechanism and r/o aspiration/FEES
FEES

## 2025-03-13 NOTE — PROGRESS NOTE ADULT - ASSESSMENT
53 yo M with multiple medical problems including CAD s/p PCI in 2024 s/p CABG x 3 on 24    1/: Intubated for hypoxia & left lung white out s/p awake bronch with removal of copious mucopurulent secretions  : s/p IABP insertion, sepsis/septic shock due to E coli   ESBL pneumonia, collapsed Left Lung, s/p multiple bronch    tracheostomy tube placement    VRE E. Faecium Bcx   +HSV PCR BAL   tissue cx from back abscess - Serratia/MRSA  - - intermittent bradycardia/junctional episodes with hypotension  2/3: off , off theophylline. iHD 1L UF  : bronch for Left lung collapse wound vac, 2decho w/ moderate pericardial effusion - similar as before, US abd: small - moderate ascites - monitor at this time.  Wound vac placed for sacral wounds  : iHD-1L UF  : bronch today off positive pressure   : concern for left food drop   2/10 : no acute issues - CXR shows improving left sided aeration, pt subjectively reports having difficulty while lying flat  : s/p Paracentesis 3.5 L removed, leukocytosis   : Ascites fluid PMN < 250 (less likely SBP)   sniff test yesterday showed paradoxical diaphragmatic motion    : mucus plugging but able to clear airway with aggressive pulmonary toileting.   : no vent requirement overnight, able to mobilize secretion with pulm toileting  hyperkalemia post HD - workup for possible recirculation underway   : On TC X 48 hours - ambulating well, no mucus plugging events  : HFTC x24hrs (40L 30%)  : iHD 1.5L UF  : iHD 2L  : trach downsized to 7, bronch  - cultures growing ESBL E Coli, sacral wound vac changed   3/6 Transfer to SDU, +PW isolated, +sacral wound vac in place, + Tube feeds running, next HD scheduled for tomorrow   3/7 VSS; Continue with current medication regimen.  Continue on TC 40% 02.  Plan for HD today.  Nocturnal feeding.  3/8  FEES 3/3 notable for poor secretion management evidenced unable to clear 2/2 weak cough B/l vocal cords appeared to be adducted at midline. ENT eval demonstrates  limited vocal cord abduction vs. questionable vocal cord paresis--no plan for ENT intervention at this time, No PMV . Local sacral wound care by PT.   3/9 HD tomorrow, US abd in am Tolerating pm feeds  3/10 US abd and HD today. CXR PA/LAT. Consider ENT to reassess VC paresis feasibility of decannulation   Disposition: Home PT / OT     3/11  suction prn.  OOB to chair.  Eliquis for a flutter.  ENT note appreciated  Maintain npo, S&S f/u  3/12 The pt is for HD today, plan on trach decannulation after dialysis. Suction x 1 this am for minimal secretions,   3/13 VSS speech an dswall evaluation today labs with HD

## 2025-03-13 NOTE — SWALLOW BEDSIDE ASSESSMENT ADULT - SWALLOW EVAL: PATIENT/FAMILY GOALS STATEMENT
Pt endorses L-sided throat pain at baseline which worsens when swallowing "because of feeding tube"; Pt is hopeful to do well on swallow testing and have NGT removed
Pt agreeable to exam
wants fried chicken

## 2025-03-14 LAB
ALP SERPL-CCNC: 106 U/L — SIGNIFICANT CHANGE UP (ref 40–120)
ALT FLD-CCNC: 7 U/L — LOW (ref 10–45)
ANION GAP SERPL CALC-SCNC: 16 MMOL/L — SIGNIFICANT CHANGE UP (ref 5–17)
AST SERPL-CCNC: 16 U/L — SIGNIFICANT CHANGE UP (ref 10–40)
BILIRUB SERPL-MCNC: 0.4 MG/DL — SIGNIFICANT CHANGE UP (ref 0.2–1.2)
BUN SERPL-MCNC: 38 MG/DL — HIGH (ref 7–23)
CALCIUM SERPL-MCNC: 8.7 MG/DL — SIGNIFICANT CHANGE UP (ref 8.4–10.5)
CHLORIDE SERPL-SCNC: 97 MMOL/L — SIGNIFICANT CHANGE UP (ref 96–108)
CO2 SERPL-SCNC: 24 MMOL/L — SIGNIFICANT CHANGE UP (ref 22–31)
CREAT SERPL-MCNC: 5.45 MG/DL — HIGH (ref 0.5–1.3)
EGFR: 12 ML/MIN/1.73M2 — LOW
EGFR: 12 ML/MIN/1.73M2 — LOW
GLUCOSE SERPL-MCNC: 163 MG/DL — HIGH (ref 70–99)
HBV SURFACE AG SER-ACNC: SIGNIFICANT CHANGE UP
HCT VFR BLD CALC: 31.6 % — LOW (ref 39–50)
HGB BLD-MCNC: 10.2 G/DL — LOW (ref 13–17)
MCHC RBC-ENTMCNC: 32.3 G/DL — SIGNIFICANT CHANGE UP (ref 32–36)
MCHC RBC-ENTMCNC: 32.6 PG — SIGNIFICANT CHANGE UP (ref 27–34)
MCV RBC AUTO: 101 FL — HIGH (ref 80–100)
NRBC BLD AUTO-RTO: 0 /100 WBCS — SIGNIFICANT CHANGE UP (ref 0–0)
PLATELET # BLD AUTO: 233 K/UL — SIGNIFICANT CHANGE UP (ref 150–400)
POTASSIUM SERPL-MCNC: 4.3 MMOL/L — SIGNIFICANT CHANGE UP (ref 3.5–5.3)
POTASSIUM SERPL-SCNC: 4.3 MMOL/L — SIGNIFICANT CHANGE UP (ref 3.5–5.3)
PROT SERPL-MCNC: 6.8 G/DL — SIGNIFICANT CHANGE UP (ref 6–8.3)
RBC # BLD: 3.13 M/UL — LOW (ref 4.2–5.8)
RBC # FLD: 18.9 % — HIGH (ref 10.3–14.5)
SODIUM SERPL-SCNC: 137 MMOL/L — SIGNIFICANT CHANGE UP (ref 135–145)
WBC # BLD: 14.82 K/UL — HIGH (ref 3.8–10.5)
WBC # FLD AUTO: 14.82 K/UL — HIGH (ref 3.8–10.5)

## 2025-03-14 PROCEDURE — 71045 X-RAY EXAM CHEST 1 VIEW: CPT | Mod: 26

## 2025-03-14 RX ORDER — INSULIN LISPRO 100 U/ML
INJECTION, SOLUTION INTRAVENOUS; SUBCUTANEOUS
Refills: 0 | Status: DISCONTINUED | OUTPATIENT
Start: 2025-03-14 | End: 2025-03-17

## 2025-03-14 RX ORDER — HYDROMORPHONE/SOD CHLOR,ISO/PF 2 MG/10 ML
0.5 SYRINGE (ML) INJECTION ONCE
Refills: 0 | Status: DISCONTINUED | OUTPATIENT
Start: 2025-03-14 | End: 2025-03-14

## 2025-03-14 RX ADMIN — Medication 0.5 MILLIGRAM(S): at 20:20

## 2025-03-14 RX ADMIN — METOPROLOL SUCCINATE 25 MILLIGRAM(S): 50 TABLET, EXTENDED RELEASE ORAL at 18:57

## 2025-03-14 RX ADMIN — MIRTAZAPINE 7.5 MILLIGRAM(S): 30 TABLET, FILM COATED ORAL at 23:24

## 2025-03-14 RX ADMIN — Medication 3 MILLILITER(S): at 00:00

## 2025-03-14 RX ADMIN — EPOETIN ALFA 10000 UNIT(S): 10000 SOLUTION INTRAVENOUS; SUBCUTANEOUS at 15:54

## 2025-03-14 RX ADMIN — Medication 0.5 MILLIGRAM(S): at 09:01

## 2025-03-14 RX ADMIN — Medication 0.5 MILLIGRAM(S): at 09:16

## 2025-03-14 RX ADMIN — BUDESONIDE 0.5 MILLIGRAM(S): 0.25 SUSPENSION RESPIRATORY (INHALATION) at 05:35

## 2025-03-14 RX ADMIN — Medication 5 MILLIGRAM(S): at 23:25

## 2025-03-14 RX ADMIN — METHOCARBAMOL 500 MILLIGRAM(S): 500 TABLET, FILM COATED ORAL at 05:35

## 2025-03-14 RX ADMIN — Medication 500 MILLIGRAM(S): at 13:32

## 2025-03-14 RX ADMIN — METOPROLOL SUCCINATE 25 MILLIGRAM(S): 50 TABLET, EXTENDED RELEASE ORAL at 05:35

## 2025-03-14 RX ADMIN — APIXABAN 2.5 MILLIGRAM(S): 2.5 TABLET, FILM COATED ORAL at 05:35

## 2025-03-14 RX ADMIN — Medication 650 MILLIGRAM(S): at 23:23

## 2025-03-14 RX ADMIN — Medication 40 MILLIGRAM(S): at 13:31

## 2025-03-14 RX ADMIN — Medication 81 MILLIGRAM(S): at 13:35

## 2025-03-14 RX ADMIN — INSULIN LISPRO 2: 100 INJECTION, SOLUTION INTRAVENOUS; SUBCUTANEOUS at 06:37

## 2025-03-14 RX ADMIN — METHOCARBAMOL 500 MILLIGRAM(S): 500 TABLET, FILM COATED ORAL at 13:50

## 2025-03-14 RX ADMIN — Medication 3 MILLILITER(S): at 13:30

## 2025-03-14 RX ADMIN — APIXABAN 2.5 MILLIGRAM(S): 2.5 TABLET, FILM COATED ORAL at 18:56

## 2025-03-14 RX ADMIN — Medication 1 APPLICATION(S): at 05:36

## 2025-03-14 RX ADMIN — Medication 100 MILLIGRAM(S): at 23:24

## 2025-03-14 RX ADMIN — Medication 3 MILLILITER(S): at 05:36

## 2025-03-14 RX ADMIN — Medication 650 MILLIGRAM(S): at 23:50

## 2025-03-14 RX ADMIN — ATORVASTATIN CALCIUM 80 MILLIGRAM(S): 80 TABLET, FILM COATED ORAL at 23:25

## 2025-03-14 RX ADMIN — Medication 0.5 MILLIGRAM(S): at 19:51

## 2025-03-14 RX ADMIN — Medication 300 MILLIGRAM(S): at 13:35

## 2025-03-14 RX ADMIN — METHOCARBAMOL 500 MILLIGRAM(S): 500 TABLET, FILM COATED ORAL at 23:24

## 2025-03-14 NOTE — SWALLOW FEES ASSESSMENT ADULT - UNSUCCESSFUL STRATEGIES TRIALED DURING PROCEDURE
do not consistently improve airway protection/chin tuck/hard swallow/head turn to the right/head turn to the left/productive volitional cough following clinician cue
chin tuck

## 2025-03-14 NOTE — SWALLOW FEES ASSESSMENT ADULT - DIAGNOSTIC IMPRESSIONS
Patient seen for FEES to determine candidacy for oral diet s/p trach decannulation. Pt presents with a mild pharyngeal dysphagia characterized by incomplete airway closure resulting in laryngeal penetration during the swallow with purees and all liquid consistencies. Penetrated material is not retrieved on initial swallow, but is fully retrieved after secondary swallow with puree and all liquids VIA CUP. Thin liquids via straw result in laryngeal penetration without full retrieval, even after cued throat clear/repeat swallow. Material never observed to reach the level of the VFs across all consistencies and modes of presentation. Amount of penetration and ability to fully retrieve material was worse with NGT in place, but swallow profile improved with progression of PO trials and removal of NGT. Overall, pt's swallow is functional for a regular diet with thin liquids via cup (no straws).

## 2025-03-14 NOTE — SWALLOW FEES ASSESSMENT ADULT - COMMENTS
secretions mobilized with cued cough and swallow and fully cleared with progression of PO trials  vocal folds mobile bilaterally +ESRD on HD. Multi-CAD s/p C3L on 12/30/24. Extubated (12/31) after x1 day. Post-op, patient developed worsening cardiogenic shock requiring inotropic support and an IABP; reintubated 1/2-1/8. Extubated to [facility]. Bedside swallow evaluation and FEES performed 1/13. 1/15 Pt w/ respiratory distress and cardiac arrest requiring intubation. S/p trach 1/16. No PMV evaluation/placement per MD. No plans for swallow evaluation, SLP signed off 1/31. +New abscess in right buttocks and coccygeal area, s/p I&D of back abscess and sacral decub 2/1. Wound vac in place. L chest tube placed for pleural effusion. ID following throughout hospital course for multiple infections. EP following for intermittent bradycardia w/ hypotension, dobutabmine re-initiated. CXR w/ L lung collapse s/p bronch 2/4. Neuro consulted for L foot drop and notes "Presentation appears to peripheral in etiology given the involvement of multiple nerves." CTSx consulted due to concern for diaphragmatic paralysis;+L hemidiaphragm dysfunction. S/p paracentesis 2/11. 2/13 s/p Left robotic VATS Diaphragm plication. Per ENT 3/10, +aspiration of secretions, limited vocal cord abduction b/l; tracheoscopy WNL. Pt decannulated 3/12.     ***Seen for initial bedside swallow evaluation 1/11/25 with c/f pharyngeal dysphagia and mild dysphonia. NPO, with non-oral nutrition/hydration/medications. FEES 1/13 notable for "pooling and penetration of frothy secretions are noted. Secretions are not cleared with use of ice chips. There is reduced airway closure and reduced supra/subglottic sensation contributing to laryngeal penetration and aspiration of purees, thickened liquids and ice chips." Reduced movement of the left vocal cord is noted. SLP reconsulted for swallowing. FEES 3/3 notable for aspiration of secretions and b/l vocal cords w/ limited abduction. No PO trials administered.    New consult for swallow eval 3/13 -> rec FEES to re-assess s/p decannulation

## 2025-03-14 NOTE — SWALLOW FEES ASSESSMENT ADULT - LARYNGEAL PENETRATION DURING SWALLOW - SILENT
suspect over the AE folds and laryngeal surface of the epiglottis; eventually retrieved with cued throat clear and repeat swallows; never descends to the vocal folds; once NGT removed, amount of penetration reduced to trace and fully retrieved with just subsequent swallow (no throat clear)/Mild over the AE folds and laryngeal surface of the epiglottis; deep; not fully retrieved with repeat swallow/Trace suspected over the AE folds and laryngeal surface of the epiglottis; retrieved on secondary swallow/Trace

## 2025-03-14 NOTE — PROGRESS NOTE ADULT - PROVIDER SPECIALTY LIST ADULT
[address]  [address] [facility] [place][[address] [address]  Dept: [phone]  Dept Fax: [phone]    Su Osborn is a 22 y.o. female who presents to the urgent care today for her medical conditions/complaints as notedbelow. Su Osborn is c/o of Rash (on legs - was on back of legs but now spread to front of legs also)      HPI:     50-year-old female patient presents with complaints of vesicular mildly pruritic rash to bilateral thighs. Had similar rash little over a month ago but went away. Feels well, no sore throat no chills or fevers no URI symptoms. Symptoms started about 2 weeks ago. Started same time as started prenatal vitamins so thought that was the cause. She stopped the vitamins but rash continues and is slowly spreading. She has not tried anything over-the-counter. Denies change in beauty products. Rash   This is a new problem. The current episode started 1 to 4 weeks ago. The problem has been gradually worsening since onset. The affected locations include the right upper leg and left upper leg. The rash is characterized by blistering, pain, itchiness and redness. It is unknown if there was an exposure to a precipitant. Pertinent negatives include no anorexia, congestion, cough, facial edema, fatigue, fever, joint pain, rhinorrhea, shortness of breath, sore throat or vomiting. Past treatments include nothing. The treatment provided no relief.        Past Medical History:   Diagnosis Date    Anxiety     Depression         Current Outpatient Medications   Medication Sig Dispense Refill    cephALEXin (KEFLEX) 500 MG capsule Take 1 capsule by mouth 4 times daily for 10 days 40 capsule 0    predniSONE (DELTASONE) 20 MG tablet Take 1 tablet by mouth 2 times daily for 5 days 10 tablet 0    busPIRone (BUSPAR) 10 MG tablet Take 1 tablet by mouth 3 times daily 60 tablet 3    desogestrel-ethinyl estradiol (VIORELE) 0.15-0.02/0.01 MG (21/5) CT Surgery

## 2025-03-14 NOTE — PROGRESS NOTE ADULT - PROBLEM SELECTOR PLAN 6
NPO on Nocturnal enteral feeding via Nasogastric Tube Amanda Hooker 1.5 @ 70 cc / hr x 12 hours   ENT consult recalled to re-evaluate   speech and swallow eval 3/13 ENT consult recalled to re-evaluate   speech and swallow eval 3/13  3/14 FEES today= Passed regular diet

## 2025-03-14 NOTE — SWALLOW FEES ASSESSMENT ADULT - SLP PERTINENT HISTORY OF CURRENT PROBLEM
54y M with PMHx of HTN, HLD, ESRD on HD MWF via LUE AVF, ascites (requiring repeat paracenteses q4-6wks), NICM with moderate LV dysfunction w/ EF 35-40%(2023) and CAD s/p atherectomy + SALLIE to D1(4/11/2024) presents to [facility] transferred from [facility]. Patient initially presented to [facility] ED with SOB. Of note Pt recently admitted from 09/27-12/02 for acute cholecystitis s/p cholecystectomy. In [facility] ED, pt was hypoxic to 86% on RA, trop 1.7K, EKG no new changes, CXR fluid overload. Admitted for AHRF 2/2 fluid overload. Pt received HD. DAPT was resumed, TTE showed improvement in LVEF to 40-45%. Stress test was abnormal with 1mm inferior STD, reversible ischemia in the inferior wall and fixed defects in the lateral, anterior and apical walls with post stress EF of 24%. Pt was then transferred to [facility] for cardiac cath which showed pLAD 70% ISR, mLCx 70%, D1 severe dz. Transferred to [facility] 12/24 for CABG w/ CTS Dr. Rivers.
54M with PMH of HTN, HLD, ESRD on HD MWF via LUE AVF, ascites (requiring repeat paracenteses q4-6wks), NICM with moderate LV dysfunction w/ EF 35-40%(2023) and CAD s/p atherectomy + SALLIE to D1(4/11/2024) presents to [facility] transferred from [facility]. Patient initially presented to [facility] ED with shortness of breath. Of note he was recently admitted from 09/27-12/02 for acute cholecystitis s/p cholecystectomy. In [facility] ED, pt was hypoxic to 86% on RA, trop 1.7K, EKG no new changes, CXR fluid overload. Admitted for AHRF 2/2 fluid overload. Pt received HD on 12/18, 12/19, 12/20 and 12/22. DAPT was resumed, TTE showed improvement in LVEF to 40-45%. Stress test was abnormal with 1mm inferior STD, reversible ischemia in the inferior wall and fixed defects in the lateral, anterior and apical walls with post stress EF of 24%. Pt was then transferred to [facility] for cardiac cath which showed pLAD 70% ISR, mLCx 70%, D1 severe dz. Patient now transferred to [facility] CTS Dr. Rivers for CABG
54y M with PMHx of HTN, HLD, ESRD on HD MWF via LUE AVF, ascites (requiring repeat paracenteses q4-6wks), NICM with moderate LV dysfunction w/ EF 35-40%(2023) and CAD s/p atherectomy + SALLIE to D1(4/11/2024) presents to [facility]. Of note Pt recently admitted from 09/27-12/02 for acute cholecystitis s/p [facility]olecystectomy. [facility] Pt admitted for AHRF 2/2 fluid overload. Pt received HD. DAPT was resumed, TTE showed improvement in LVEF to 40-45%. Stress test was abnormal with 1mm inferior STD, reversible ischemia in the inferior wall and fixed defects in the lateral, anterior and apical walls with post stress EF of 24%. Pt was then transferred to [facility] for cardiac cath which showed pLAD 70% ISR, mLCx 70%, D1 severe dz. Transferred to [facility] 12/24 for CABG w/ CTS Dr. Rivers.

## 2025-03-14 NOTE — SWALLOW FEES ASSESSMENT ADULT - RECOMMENDED CONSISTENCY
NPO, with non-oral nutrition/hydration/medications.
Continue NPO w/ alternative means of nutrition/hydration/medication
regular solids and thin liquids (cup only; no straw)  2 swallows per bite/sip

## 2025-03-14 NOTE — PROGRESS NOTE ADULT - ASSESSMENT
54M with PMH of HTN, HLD, ESRD on HD MWF via LUE AVF, ascites (requiring repeat paracenteses q4-6wks), NICM with moderate LV dysfunction w/ EF 35-40%(2023) and CAD s/p atherectomy + SALLIE to D1(4/11/2024) presents to [facility] transferred from [facility] for CABG eval  Nephro on board for HD needs.    ESRD on HD   Nephrologist Dr. Bailey  [facility] Schedule / Access:  LUE AVF  Center: [facility]  Pt s/p HD on 2/19 and 2/21.  s/p HD 2/24 uf 1.5L  S/p HD on 02/26 2 L UF and 02/28 2 L UF again   S/p HD on 03/03 and 03/5, 03/07  S/p HD on 03/10 AND 03/12  HD today   Keep MAP>65  Renal Diet  HD consent in chart   Pending labs since 03/10    Hyper/Hypokalemia  Pt intermittently Hyperkalemia  HD as scheduled  now improved  Low K diet.  C/W Lokelma 10gm on nonHD days.  Monitor K.    HTN  BP fluctuating; controlled.  UF w/ HD as BP permits.  Low sodium diet.  Management per CTICU  Monitor BP     CKD MBD  Check PTH   Low PO4 diet.  Not on binder.  Monitor Ca , PO4 daily     Anemia  CRISTIANE with HD  Repeat iron studies.  Transfuse if hgb <7    CAD with multivessel disease  CTICU following  s/p CABG 12/30    Hypoxic respiratory failure   S/p tracheostomy 01/18 and decannulated on 03/11  Per surgery  Pulm following

## 2025-03-14 NOTE — SWALLOW FEES ASSESSMENT ADULT - NS SWALLOW FEES REC ASPIR MON
change of breathing pattern/cough/gurgly voice/fever/pneumonia/throat clearing/upper respiratory infection
Monitor for s/s aspiration/laryngeal penetration. If noted:  D/C p.o. intake, provide non-oral nutrition/hydration/meds, and contact this service @ [phone]/change of breathing pattern/fever/pneumonia/upper respiratory infection

## 2025-03-14 NOTE — SWALLOW FEES ASSESSMENT ADULT - ROSENBEK'S PENETRATION ASPIRATION SCALE
(1) no aspiration, material does not enter airway (3 with NGT in place)/(2) material enters airway, remains above the vocal cords, no residue remains (penetration) (2) material enters airway, remains above the vocal cords, no residue remains (penetration)

## 2025-03-14 NOTE — PROGRESS NOTE ADULT - SUBJECTIVE AND OBJECTIVE BOX
MR#[id number]  PATIENT NAME:RAAD CANO    DATE OF SERVICE: 03-14-25 @ 06:12  Patient was seen and examined by Solo Quick MD on    03-14-25 @ 06:12 .  Interim events noted.Consultant notes ,Labs,Telemetry reviewed by me       HOSPITAL COURSE: HPI:  54M with PMH of HTN, HLD, ESRD on HD MWF via LUE AVF, ascites (requiring repeat paracenteses q4-6wks), NICM with moderate LV dysfunction w/ EF 35-40%(2023) and CAD s/p atherectomy + SALLIE to D1(4/11/2024) presents to [facility] transferred from [facility].  Cardiac cath which showed pLAD 70% ISR, mLCx 70%, D1 severe dz.   Patient now transferred to [facility] CTS Dr. Rivers for CABG eval.     (24 Dec 2024 05:07)      INTERIM EVENTS:Patient seen at bedside ,interim events noted.  12/23 Cardiac cath  pLAD 70% ISR, mLCx 70%, D1 severe dz.   12/31-POD#1-C3L free LIMA-LAD; SVG-Diag; SVG-leftPL Awake OOB extubated Sinus rhythm on Dobutamine gtt  03/04-Awake Had FEES still NPO Atrial Flutter   03/06-Tx to SDU  03/07-Awake Atrial Flutter   03/08-Awake VSS Atrial Flutter still on NGT  03/09-Awake Atrial Flutter on TC 30% still has poor cough  03/10-Awake lying in bed Atrial Flutter on TC 30% for US Abdomen today  03/12-Still NPO Atrial Flutter no dyspnea seen by ENT  03/14-Trach removed      AMBULATION: Assisted[ ] Cane/walker[x ] Independent[ ]      MEDICATIONS  (STANDING):  apixaban 2.5 milliGRAM(s) Oral two times a day  ascorbic acid 500 milliGRAM(s) Oral daily  aspirin  chewable 81 milliGRAM(s) Oral daily  atorvastatin 80 milliGRAM(s) Oral at bedtime  buDESOnide    Inhalation Suspension 0.5 milliGRAM(s) Inhalation two times a day  chlorhexidine 2% Cloths 1 Application(s) Topical daily  chlorhexidine 4% Liquid 1 Application(s) Topical <User Schedule>  dextrose 50% Injectable 50 milliLiter(s) IV Push every 15 minutes  epoetin ignacia (EPOGEN) Injectable 52136 Unit(s) IV Push <User Schedule>  ferrous    sulfate Liquid 300 milliGRAM(s) Enteral Tube daily  insulin lispro (ADMELOG) corrective regimen sliding scale   SubCutaneous every 6 hours  melatonin 5 milliGRAM(s) Oral at bedtime  methocarbamol 500 milliGRAM(s) Oral every 8 hours  metoprolol tartrate 25 milliGRAM(s) Oral two times a day  mirtazapine 7.5 milliGRAM(s) Oral at bedtime  Nephro-elicia 1 Tablet(s) Oral daily  pantoprazole  Injectable 40 milliGRAM(s) IV Push daily  sodium chloride 0.65% Nasal 1 Spray(s) Both Nostrils every 12 hours  sodium chloride 0.9% for Nebulization 3 milliLiter(s) Nebulizer every 6 hours  sodium chloride 0.9%. 1000 milliLiter(s) (10 mL/Hr) IV Continuous <Continuous>  sodium zirconium cyclosilicate 10 Gram(s) Oral <User Schedule>  traZODone 100 milliGRAM(s) Oral at bedtime    MEDICATIONS  (PRN):  acetaminophen     Tablet .. 650 milliGRAM(s) Oral every 6 hours PRN Mild Pain (1 - 3)  HYDROmorphone  Injectable 0.5 milliGRAM(s) IV Push every 6 hours PRN Severe Pain (7 - 10)  lidocaine/prilocaine Cream 1 Application(s) Topical daily PRN pre-HD  sodium chloride 0.9% lock flush 10 milliLiter(s) IV Push every 1 hour PRN Pre/post blood products, medications, blood draw, and to maintain line patency            REVIEW OF SYSTEMS:  Constitutional: [ ] fever, [ ]weight loss,  [ ]fatigue [ ]weight gain  Eyes: [ ] visual changes  Respiratory: [ ]shortness of breath;  [ ] cough, [ ]wheezing, [ ]chills, [ ]hemoptysis  Cardiovascular: [ ] chest pain, [ ]palpitations, [ ]dizziness,  [ ]leg swelling[ ]orthopnea[ ]PND  Gastrointestinal: [ ] abdominal pain, [ ]nausea, [ ]vomiting,  [ ]diarrhea [ ]Constipation [ ]Melena  Genitourinary: [ ] dysuria, [ ] hematuria [ ]Vigil  Neurologic: [ ] headaches [ ] tremors[ ]weakness [ ]Paralysis Right[ ] Left[ ]  Skin: [ ] itching, [ ]burning, [ ] rashes  Endocrine: [ ] heat or cold intolerance  Musculoskeletal: [ ] joint pain or swelling; [ ] muscle, back, or extremity pain  Psychiatric: [ ] depression, [ ]anxiety, [ ]mood swings, or [ ]difficulty sleeping  Hematologic: [ ] easy bruising, [ ] bleeding gums    [ ] All remaining systems negative except as per above.   [ ]Unable to obtain.  [x] No change in ROS since admission      Vital Signs Last 24 Hrs  T(C): 36.7 (14 Mar 2025 00:05), Max: 36.7 (13 Mar 2025 06:56)  T(F): 98.1 (14 Mar 2025 00:05), Max: 98.1 (13 Mar 2025 19:22)  HR: 93 (14 Mar 2025 00:05) (64 - 93)  BP: 129/60 (14 Mar 2025 00:05) (106/55 - 143/58)  BP(mean): 73 (13 Mar 2025 19:22) (73 - 90)  RR: 18 (14 Mar 2025 00:05) (18 - 18)  SpO2: 90% (14 Mar 2025 00:05) (90% - 100%)    Parameters below as of 14 Mar 2025 00:05  Patient On (Oxygen Delivery Method): nasal cannula      I&O's Summary    12 Mar 2025 07:01  -  13 Mar 2025 07:00  --------------------------------------------------------  IN: 600 mL / OUT: 2000 mL / NET: -1400 mL    13 Mar 2025 07:01  -  14 Mar 2025 06:12  --------------------------------------------------------  IN: 920 mL / OUT: 0 mL / NET: 920 mL        PHYSICAL EXAM:  General: No acute distress BMI-24  HEENT: EOMI, PERRL  Neck: Supple, [ ] JVD  Lungs: Equal air entry bilaterally; [ ] rales [ ] wheezing [ ] rhonchi  Heart: Regular rate and rhythm; [x ] murmur   2/6 [ x] systolic [ ] diastolic [ ] radiation[ ] rubs [ ]  gallops  Abdomen: Nontender, bowel sounds present  Extremities: No clubbing, cyanosis, [ ] edema [ ]Pulses  equal and intact  Nervous system:  Alert & Oriented X3, no focal deficits  Psychiatric: Normal affect  Skin: No rashes or lesions    LABS:  03-12    138  |  97  |  30[H]  ----------------------------<  126[H]  4.0   |  25  |  5.55[H]    Ca    8.5      12 Mar 2025 09:58  Phos  4.5     03-12  Mg     2.6     03-12    TPro  6.3  /  Alb  2.7[L]  /  TBili  0.4  /  DBili  x   /  AST  16  /  ALT  8[L]  /  AlkPhos  101  03-12    Creatinine Trend: 5.55<--, 6.17<--, 5.31<--, 4.92<--, 3.99<--, 5.47<--                        9.8    11.05 )-----------( 217      ( 12 Mar 2025 09:58 )             30.4           Xray Chest 1 View- PORTABLE-Urgent (Xray Chest 1 View- PORTABLE-Urgent .) (03.13.25 @ 08:14) >  IMPRESSION:  Interval removal of tracheostomy tube.  No focal consolidation.

## 2025-03-14 NOTE — SWALLOW FEES ASSESSMENT ADULT - ASPIRATION PRECAUTIONS
Strict aspiration and reflux precautions for secretions and tube feeds!/yes
for secretions and enteral feeds/yes
yes

## 2025-03-14 NOTE — SWALLOW FEES ASSESSMENT ADULT - ADDITIONAL RECOMMENDATIONS
maintain good oral hygiene  SLP to follow to ensure tolerance of recommended diet
GOAL: Patient will obtain safe and adequate nutrition/hydration/medications via least restrictive means.     diligent oral care

## 2025-03-14 NOTE — PROGRESS NOTE ADULT - ASSESSMENT
55 yo M with multiple medical problems including CAD s/p PCI in 2024 s/p CABG x 3 on 24    1/: Intubated for hypoxia & left lung white out s/p awake bronch with removal of copious mucopurulent secretions  : s/p IABP insertion, sepsis/septic shock due to E coli   ESBL pneumonia, collapsed Left Lung, s/p multiple bronch    tracheostomy tube placement    VRE E. Faecium Bcx   +HSV PCR BAL   tissue cx from back abscess - Serratia/MRSA  - - intermittent bradycardia/junctional episodes with hypotension  2/3: off , off theophylline. iHD 1L UF  : bronch for Left lung collapse wound vac, 2decho w/ moderate pericardial effusion - similar as before, US abd: small - moderate ascites - monitor at this time.  Wound vac placed for sacral wounds  : iHD-1L UF  : bronch today off positive pressure   : concern for left food drop   2/10 : no acute issues - CXR shows improving left sided aeration, pt subjectively reports having difficulty while lying flat  : s/p Paracentesis 3.5 L removed, leukocytosis   : Ascites fluid PMN < 250 (less likely SBP)   sniff test yesterday showed paradoxical diaphragmatic motion    : mucus plugging but able to clear airway with aggressive pulmonary toileting.   : no vent requirement overnight, able to mobilize secretion with pulm toileting  hyperkalemia post HD - workup for possible recirculation underway   : On TC X 48 hours - ambulating well, no mucus plugging events  : HFTC x24hrs (40L 30%)  : iHD 1.5L UF  : iHD 2L  : trach downsized to 7, bronch  - cultures growing ESBL E Coli, sacral wound vac changed   3/6 Transfer to SDU, +PW isolated, +sacral wound vac in place, + Tube feeds running, next HD scheduled for tomorrow   3/7 VSS; Continue with current medication regimen.  Continue on TC 40% 02.  Plan for HD today.  Nocturnal feeding.  3/8  FEES 3/3 notable for poor secretion management evidenced unable to clear 2/2 weak cough B/l vocal cords appeared to be adducted at midline. ENT eval demonstrates  limited vocal cord abduction vs. questionable vocal cord paresis--no plan for ENT intervention at this time, No PMV . Local sacral wound care by PT.   3/9 HD tomorrow, US abd in am Tolerating pm feeds  3/10 US abd and HD today. CXR PA/LAT. Consider ENT to reassess VC paresis feasibility of decannulation   Disposition: Home PT / OT     3/11  suction prn.  OOB to chair.  Eliquis for a flutter.  ENT note appreciated  Maintain npo, S&S f/u  3/12 The pt is for HD today, plan on trach decannulation after dialysis. Suction x 1 this am for minimal secretions,   3/13 VSS speech and swallow evaluation today  labs with HD shower today  3/14 VSS for FEES today  HD today 55 yo M with multiple medical problems including CAD s/p PCI in 2024 s/p CABG x 3 on 24    1/: Intubated for hypoxia & left lung white out s/p awake bronch with removal of copious mucopurulent secretions  : s/p IABP insertion, sepsis/septic shock due to E coli   ESBL pneumonia, collapsed Left Lung, s/p multiple bronch    tracheostomy tube placement    VRE E. Faecium Bcx   +HSV PCR BAL   tissue cx from back abscess - Serratia/MRSA  - - intermittent bradycardia/junctional episodes with hypotension  2/3: off , off theophylline. iHD 1L UF  : bronch for Left lung collapse wound vac, 2decho w/ moderate pericardial effusion - similar as before, US abd: small - moderate ascites - monitor at this time.  Wound vac placed for sacral wounds  : iHD-1L UF  : bronch today off positive pressure   : concern for left food drop   2/10 : no acute issues - CXR shows improving left sided aeration, pt subjectively reports having difficulty while lying flat  : s/p Paracentesis 3.5 L removed, leukocytosis   : Ascites fluid PMN < 250 (less likely SBP)   sniff test yesterday showed paradoxical diaphragmatic motion    : mucus plugging but able to clear airway with aggressive pulmonary toileting.   : no vent requirement overnight, able to mobilize secretion with pulm toileting  hyperkalemia post HD - workup for possible recirculation underway   : On TC X 48 hours - ambulating well, no mucus plugging events  : HFTC x24hrs (40L 30%)  : iHD 1.5L UF  : iHD 2L  : trach downsized to 7, bronch  - cultures growing ESBL E Coli, sacral wound vac changed   3/6 Transfer to SDU, +PW isolated, +sacral wound vac in place, + Tube feeds running, next HD scheduled for tomorrow   3/7 VSS; Continue with current medication regimen.  Continue on TC 40% 02.  Plan for HD today.  Nocturnal feeding.  3/8  FEES 3/3 notable for poor secretion management evidenced unable to clear 2/2 weak cough B/l vocal cords appeared to be adducted at midline. ENT eval demonstrates  limited vocal cord abduction vs. questionable vocal cord paresis--no plan for ENT intervention at this time, No PMV . Local sacral wound care by PT.   3/9 HD tomorrow, US abd in am Tolerating pm feeds  3/10 US abd and HD today. CXR PA/LAT. Consider ENT to reassess VC paresis feasibility of decannulation   Disposition: Home PT / OT     3/11  suction prn.  OOB to chair.  Eliquis for a flutter.  ENT note appreciated  Maintain npo, S&S f/u  3/12 The pt is for HD today, plan on trach decannulation after dialysis. Suction x 1 this am for minimal secretions,   3/13 VSS speech and swallow evaluation today  labs with HD shower today  3/14 VSS for FEES today= Passed regular diet   HD today

## 2025-03-14 NOTE — PROGRESS NOTE ADULT - SUBJECTIVE AND OBJECTIVE BOX
[facility]    Dr. Nica Styles     Office [phone] (9 am to 5 pm)  Service: [phone] (5pm to 9am)  Also Available on TEAMS      RENAL PROGRESS NOTE: DATE OF SERVICE 03-14-25 @ 11:37    Patient is a 54y old  Male who presents with a chief complaint of cad (14 Mar 2025 10:09)      Patient seen and examined at bedside. No chest pain/sob    VITALS:  T(F): 97.9 (03-14-25 @ 07:09), Max: 98.1 (03-13-25 @ 19:22)  HR: 68 (03-14-25 @ 08:00)  BP: 124/55 (03-14-25 @ 07:09)  RR: 18 (03-14-25 @ 07:09)  SpO2: 100% (03-14-25 @ 08:00)  Wt(kg): --    03-13 @ 07:01  -  03-14 @ 07:00  --------------------------------------------------------  IN: 920 mL / OUT: 0 mL / NET: 920 mL          PHYSICAL EXAM:  Constitutional: NAD  Neck: No JVD  Respiratory: CTAB, no wheezes, rales or rhonchi  Cardiovascular: S1, S2, RRR  Gastrointestinal: BS+, soft, NT/ND  Extremities: No peripheral edema    Hospital Medications:   MEDICATIONS  (STANDING):  apixaban 2.5 milliGRAM(s) Oral two times a day  ascorbic acid 500 milliGRAM(s) Oral daily  aspirin  chewable 81 milliGRAM(s) Oral daily  atorvastatin 80 milliGRAM(s) Oral at bedtime  buDESOnide    Inhalation Suspension 0.5 milliGRAM(s) Inhalation two times a day  chlorhexidine 2% Cloths 1 Application(s) Topical daily  chlorhexidine 4% Liquid 1 Application(s) Topical <User Schedule>  dextrose 50% Injectable 50 milliLiter(s) IV Push every 15 minutes  epoetin ignacia (EPOGEN) Injectable 11661 Unit(s) IV Push <User Schedule>  ferrous    sulfate Liquid 300 milliGRAM(s) Enteral Tube daily  insulin lispro (ADMELOG) corrective regimen sliding scale   SubCutaneous every 6 hours  melatonin 5 milliGRAM(s) Oral at bedtime  methocarbamol 500 milliGRAM(s) Oral every 8 hours  metoprolol tartrate 25 milliGRAM(s) Oral two times a day  mirtazapine 7.5 milliGRAM(s) Oral at bedtime  Nephro-elicia 1 Tablet(s) Oral daily  pantoprazole  Injectable 40 milliGRAM(s) IV Push daily  sodium chloride 0.65% Nasal 1 Spray(s) Both Nostrils every 12 hours  sodium chloride 0.9% for Nebulization 3 milliLiter(s) Nebulizer every 6 hours  sodium chloride 0.9%. 1000 milliLiter(s) (10 mL/Hr) IV Continuous <Continuous>  sodium zirconium cyclosilicate 10 Gram(s) Oral <User Schedule>  traZODone 100 milliGRAM(s) Oral at bedtime      LABS:        Creatinine Trend: 5.55 <--, 6.17 <--, 5.31 <--            Urine Studies:  Urinalysis - [03-12-25 @ 09:58]      Color  / Appearance  / SG  / pH       Gluc 126 / Ketone   / Bili  / Urobili        Blood  / Protein  / Leuk Est  / Nitrite       RBC  / WBC  / Hyaline  / Gran  / Sq Epi  / Non Sq Epi  / Bacteria       Iron 29, TIBC 143, %sat 20      [12-19-24 @ 05:00]  Ferritin 904      [12-19-24 @ 05:00]  TSH 4.75      [12-24-24 @ 06:50]        RADIOLOGY & ADDITIONAL STUDIES:

## 2025-03-14 NOTE — SWALLOW FEES ASSESSMENT ADULT - SPECIFY REASON(S)
to objectively assess the swallow mechanism and r/o aspiration
to re-assess the swallow mechanism and r/o dysphagia; Pt decannulated 3/12
to assess the swallow mechanism; r/o dysphagia.

## 2025-03-14 NOTE — SWALLOW FEES ASSESSMENT ADULT - CONSISTENCIES ADMINISTERED
thin liquid ice chip; NP Tarah removed NGT after first couple of PO trials/moderately thick/pureed thin liquid/moderately thick/mildly thick soft & bite-sized/regular solid

## 2025-03-14 NOTE — SWALLOW FEES ASSESSMENT ADULT - SLP GENERAL OBSERVATIONS
Encountered Pt sitting OOB to chair on 40% FiO2 via TC. #7 Portex cuffless trach appreciated. Scant, clear secretions noted at hub of trach. Pt is alert, verbally responsive (voicing over trach), and able to follow commands for the purpose of this exam.
Pt sitting OOB to chair; +2L NC. Stoma site covered w/ gauze. Pt is A&Ox4, verbally responsive w/ adequate vocal volume, and able to follow commands. Intermittent wet vocal quality, but able to resolve with cued throat clear. +NGT at start of exam; NGT removed by NP Tarah during exam given concern for impact on swallow function

## 2025-03-14 NOTE — SWALLOW FEES ASSESSMENT ADULT - THE ABOVE FINDINGS WERE DISCUSSED WITH
MYRA Kuo; RN Yamilka/Nursing/Patient
MD Mendez, MYRA Duarte, ENT MYRA Thakur, and ENT MYRA Mai/Nursing/Patient
NP Tarah/Nursing/Patient

## 2025-03-14 NOTE — PROGRESS NOTE ADULT - SUBJECTIVE AND OBJECTIVE BOX
Subjective " hello My test is today"    VITAL SIGNS    Telemetry: Aflutter      Vital Signs Last 24 Hrs  T(C): 36.6 (25 @ 07:09), Max: 36.7 (25 @ 19:22)  T(F): 97.9 (25 @ 07:09), Max: 98.1 (25 @ 19:22)  HR: 68 (25 @ 08:00) (67 - 93)  BP: 124/55 (25 @ 07:09) (106/55 - 135/63)  RR: 18 (25 @ 07:09) (18 - 18)  SpO2: 100% (25 @ 08:00) (90% - 100%)            07:01  -   @ 07:00  --------------------------------------------------------  IN: 920 mL / OUT: 0 mL / NET: 920 mL     Daily Weight in k.5 (13 Mar 2025 12:20)    MEDICATIONS  (STANDING):  apixaban 2.5 milliGRAM(s) Oral two times a day  ascorbic acid 500 milliGRAM(s) Oral daily  aspirin  chewable 81 milliGRAM(s) Oral daily  atorvastatin 80 milliGRAM(s) Oral at bedtime  buDESOnide    Inhalation Suspension 0.5 milliGRAM(s) Inhalation two times a day  chlorhexidine 2% Cloths 1 Application(s) Topical daily  chlorhexidine 4% Liquid 1 Application(s) Topical <User Schedule>  dextrose 50% Injectable 50 milliLiter(s) IV Push every 15 minutes  epoetin ignacia (EPOGEN) Injectable 30851 Unit(s) IV Push <User Schedule>  ferrous    sulfate Liquid 300 milliGRAM(s) Enteral Tube daily  insulin lispro (ADMELOG) corrective regimen sliding scale   SubCutaneous every 6 hours  melatonin 5 milliGRAM(s) Oral at bedtime  methocarbamol 500 milliGRAM(s) Oral every 8 hours  metoprolol tartrate 25 milliGRAM(s) Oral two times a day  mirtazapine 7.5 milliGRAM(s) Oral at bedtime  Nephro-elicia 1 Tablet(s) Oral daily  pantoprazole  Injectable 40 milliGRAM(s) IV Push daily  sodium chloride 0.65% Nasal 1 Spray(s) Both Nostrils every 12 hours  sodium chloride 0.9% for Nebulization 3 milliLiter(s) Nebulizer every 6 hours  sodium chloride 0.9%. 1000 milliLiter(s) (10 mL/Hr) IV Continuous <Continuous>  sodium zirconium cyclosilicate 10 Gram(s) Oral <User Schedule>  traZODone 100 milliGRAM(s) Oral at bedtime    MEDICATIONS  (PRN):  acetaminophen     Tablet .. 650 milliGRAM(s) Oral every 6 hours PRN Mild Pain (1 - 3)  HYDROmorphone  Injectable 0.5 milliGRAM(s) IV Push every 6 hours PRN Severe Pain (7 - 10)  lidocaine/prilocaine Cream 1 Application(s) Topical daily PRN pre-HD  sodium chloride 0.9% lock flush 10 milliLiter(s) IV Push every 1 hour PRN Pre/post blood products, medications, blood draw, and to maintain line patency          CAPILLARY BLOOD GLUCOSE      POCT Blood Glucose.: 164 mg/dL (14 Mar 2025 06:32)  POCT Blood Glucose.: 143 mg/dL (14 Mar 2025 00:04)  POCT Blood Glucose.: 115 mg/dL (13 Mar 2025 17:43)  POCT Blood Glucose.: 137 mg/dL (13 Mar 2025 13:31)              Pacing Wires        [  ]   Settings:                                  Isolated  [  x]                                PHYSICAL EXAM        Neurology: alert and oriented x 3, nonfocal, no gross deficits    CV : S1S2 RRR    Sternal Wound :  FERDINAND sternum + PW,     Lungs: B/l breath sounds on 2lnc    Abdomen: Keofeed soft, nontender, distended, positive bowel sounds,  +BM 3    :   HD via AVF            Extremities:     warm well perfused equal strength throughout   B/le + Dp  no calf tender ness  sacral vac                                      Physical Therapy Rec:   Home  [  ]   Home w/ PT  [  ]  Rehab  [ x ]    Discussed with Cardiothoracic Team at AM rounds.

## 2025-03-14 NOTE — PROGRESS NOTE ADULT - PROBLEM SELECTOR PLAN 5
+ sacral decub   + wound vac  last vac change 2/27 + sacral decub   + wound vac  last vac change  3/14

## 2025-03-14 NOTE — PROGRESS NOTE ADULT - ASSESSMENT
54M with PMH of HTN, HLD, ESRD on HD MWF via LUE AVF, ascites (requiring repeat paracenteses q4-6wks), NICM with moderate LV dysfunction w/ EF 35-40%() and CAD s/p atherectomy + SALLIE to D1(2024) presents to [facility] transferred from [facility].  Cardiac cath which showed pLAD 70% ISR, mLCx 70%, D1 severe dz.   Patient now transferred to [facility] CTS Dr. Rivers for CABG eval      # CAD s/p NSTEMI  Hx CAD s/p PCI LAD   Presented with ADHF elevated troponins  Positive NST1-Cath- pLAD 70% ISR, mLCx 70%, D1 severe dz.   TTE EF 35% Severe TRWas on Plavix-p2y12- 321 been off Plavix since -POD#1-C3L free LIMA-LAD; SVG-Diag; HNF-wopeHE-Irhnhxhih on  Sinus rhythm,Amio for AF prophylaxis   Atrial Flutter on TC during day Ambulating  -Increased congestion on CXR had HD yesterday remains in AFl   -Atrial Flutter HFTC 40L/30% BP stable     -Atrial Flutter tolerating TC for HD MWF-consider DOAC   OOB Abd US moderate 4 quad ascites noted remains in Atrial Flutter~~70's   TC Atrial Flutter  -s/p Bronch/BAL remains in AFl  Trach downsized 7   -Tolerating TC AFl rate controlled On Heparin gtt transition to DOAC  -Tx to SDU on Eliquis Atrial Flutter  -VSS Atrial Flutter rate controlled on Eliquis for AC  -Atrial Flutter ENT follow up Taper O2 requirement on TC 30%  -Awake no dyspnea on TC  -Trach tube removed still unable PO      # Acute on Chronic Systolic Heart Failure now improved  EF-35% ,severe TR  Had HD post op  GDMT post op  LVEF improved post bypass   -TTE EF 40%,trace effusion  ECG c/w pericarditis-was on colchicine   01/15-TTE EF 55%  01/29-TTE EF 60%   -Normal LV systolic function Moderate pericardial effusion  -On TC-HF and Vent support at nights   02/10-Vent HS with T Collar trial Ambulated Remains in AF  Repeat TTE Normal LV Systolic function Small Pericardial effusion decreased from 2/3        Vascular evaluated  AVGraft /?steal causing RV failure-'' Very low suspicion of steal Sd and AVF causing current clinical state. ''   VA Duplex Hemodialysis Access, Left (25 @ 13:35) >   Arterial flow volume of 1301 mL/m, and the venous flow volume of  1492 mL/m are consistent with a normally functioning dialysis access  fistula.      # Ascites  Hx chronic ascites  Has drainage q4-6 weeks  Last drained -4600mL  ? ascites related to severe TR  Had ricpmmbywkro-Vgkfjkn-hp growth   CT Abdomen 01/10-Large volume ascites-  US abdomen--Small to moderate volume abdominal/pelvic ascites  US abdomen  Moderate ascites  US Abdomen -Moderate 4 quad ascites  US Abdomen Limited 03/10-Moderate ascites throughout abdomen, with large pelvic ascites.      # ESRD  Now on  HD-MWF

## 2025-03-15 PROCEDURE — 71045 X-RAY EXAM CHEST 1 VIEW: CPT | Mod: 26

## 2025-03-15 RX ORDER — HYDROMORPHONE/SOD CHLOR,ISO/PF 2 MG/10 ML
0.5 SYRINGE (ML) INJECTION ONCE
Refills: 0 | Status: DISCONTINUED | OUTPATIENT
Start: 2025-03-15 | End: 2025-03-15

## 2025-03-15 RX ORDER — INSULIN LISPRO 100 U/ML
INJECTION, SOLUTION INTRAVENOUS; SUBCUTANEOUS AT BEDTIME
Refills: 0 | Status: DISCONTINUED | OUTPATIENT
Start: 2025-03-15 | End: 2025-03-17

## 2025-03-15 RX ORDER — DEXTROSE 50 % IN WATER 50 %
25 SYRINGE (ML) INTRAVENOUS ONCE
Refills: 0 | Status: DISCONTINUED | OUTPATIENT
Start: 2025-03-15 | End: 2025-03-19

## 2025-03-15 RX ORDER — OXYCODONE HYDROCHLORIDE 30 MG/1
5 TABLET ORAL EVERY 6 HOURS
Refills: 0 | Status: DISCONTINUED | OUTPATIENT
Start: 2025-03-15 | End: 2025-03-15

## 2025-03-15 RX ORDER — OXYCODONE HYDROCHLORIDE 30 MG/1
10 TABLET ORAL ONCE
Refills: 0 | Status: DISCONTINUED | OUTPATIENT
Start: 2025-03-15 | End: 2025-03-15

## 2025-03-15 RX ORDER — INSULIN LISPRO 100 U/ML
INJECTION, SOLUTION INTRAVENOUS; SUBCUTANEOUS
Refills: 0 | Status: DISCONTINUED | OUTPATIENT
Start: 2025-03-15 | End: 2025-03-19

## 2025-03-15 RX ADMIN — OXYCODONE HYDROCHLORIDE 5 MILLIGRAM(S): 30 TABLET ORAL at 15:18

## 2025-03-15 RX ADMIN — OXYCODONE HYDROCHLORIDE 10 MILLIGRAM(S): 30 TABLET ORAL at 19:35

## 2025-03-15 RX ADMIN — APIXABAN 2.5 MILLIGRAM(S): 2.5 TABLET, FILM COATED ORAL at 17:17

## 2025-03-15 RX ADMIN — OXYCODONE HYDROCHLORIDE 5 MILLIGRAM(S): 30 TABLET ORAL at 16:18

## 2025-03-15 RX ADMIN — Medication 500 MILLIGRAM(S): at 12:16

## 2025-03-15 RX ADMIN — APIXABAN 2.5 MILLIGRAM(S): 2.5 TABLET, FILM COATED ORAL at 06:32

## 2025-03-15 RX ADMIN — OXYCODONE HYDROCHLORIDE 10 MILLIGRAM(S): 30 TABLET ORAL at 20:05

## 2025-03-15 RX ADMIN — METHOCARBAMOL 500 MILLIGRAM(S): 500 TABLET, FILM COATED ORAL at 22:03

## 2025-03-15 RX ADMIN — Medication 81 MILLIGRAM(S): at 12:16

## 2025-03-15 RX ADMIN — Medication 1 APPLICATION(S): at 00:11

## 2025-03-15 RX ADMIN — INSULIN LISPRO 2: 100 INJECTION, SOLUTION INTRAVENOUS; SUBCUTANEOUS at 11:38

## 2025-03-15 RX ADMIN — METOPROLOL SUCCINATE 25 MILLIGRAM(S): 50 TABLET, EXTENDED RELEASE ORAL at 17:16

## 2025-03-15 RX ADMIN — ATORVASTATIN CALCIUM 80 MILLIGRAM(S): 80 TABLET, FILM COATED ORAL at 22:01

## 2025-03-15 RX ADMIN — Medication 0.5 MILLIGRAM(S): at 11:45

## 2025-03-15 RX ADMIN — Medication 100 MILLIGRAM(S): at 23:17

## 2025-03-15 RX ADMIN — MIRTAZAPINE 7.5 MILLIGRAM(S): 30 TABLET, FILM COATED ORAL at 22:01

## 2025-03-15 RX ADMIN — Medication 0.5 MILLIGRAM(S): at 11:29

## 2025-03-15 RX ADMIN — Medication 650 MILLIGRAM(S): at 12:18

## 2025-03-15 RX ADMIN — METHOCARBAMOL 500 MILLIGRAM(S): 500 TABLET, FILM COATED ORAL at 13:08

## 2025-03-15 RX ADMIN — Medication 40 MILLIGRAM(S): at 12:16

## 2025-03-15 RX ADMIN — Medication 5 MILLIGRAM(S): at 23:17

## 2025-03-15 RX ADMIN — SODIUM ZIRCONIUM CYCLOSILICATE 10 GRAM(S): 5 POWDER, FOR SUSPENSION ORAL at 06:33

## 2025-03-15 RX ADMIN — Medication 650 MILLIGRAM(S): at 13:18

## 2025-03-15 RX ADMIN — METOPROLOL SUCCINATE 25 MILLIGRAM(S): 50 TABLET, EXTENDED RELEASE ORAL at 06:32

## 2025-03-15 RX ADMIN — METHOCARBAMOL 500 MILLIGRAM(S): 500 TABLET, FILM COATED ORAL at 06:32

## 2025-03-15 NOTE — PROGRESS NOTE ADULT - ASSESSMENT
54M with PMH of HTN, HLD, ESRD on HD MWF via LUE AVF, ascites (requiring repeat paracenteses q4-6wks), NICM with moderate LV dysfunction w/ EF 35-40%() and CAD s/p atherectomy + SALLIE to D1(2024) presents to [facility] transferred from [facility].  Cardiac cath which showed pLAD 70% ISR, mLCx 70%, D1 severe dz.   Patient now transferred to [facility] CTS Dr. Rivers for CABG eval      # CAD s/p NSTEMI  Hx CAD s/p PCI LAD   Presented with ADHF elevated troponins  Positive NST1-Cath- pLAD 70% ISR, mLCx 70%, D1 severe dz.   TTE EF 35% Severe TRWas on Plavix-p2y12- 321 been off Plavix since -POD#1-C3L free LIMA-LAD; SVG-Diag; JRM-lcgqOO-Aysnkndrh on  Sinus rhythm,Amio for AF prophylaxis   Atrial Flutter on TC during day Ambulating  -Increased congestion on CXR had HD yesterday remains in AFl   -Atrial Flutter HFTC 40L/30% BP stable     -Atrial Flutter tolerating TC for HD MWF-consider DOAC   OOB Abd US moderate 4 quad ascites noted remains in Atrial Flutter~~70's   TC Atrial Flutter  -s/p Bronch/BAL remains in AFl  Trach downsized 7   -Tolerating TC AFl rate controlled On Heparin gtt transition to DOAC  -Tx to SDU on Eliquis Atrial Flutter  -VSS Atrial Flutter rate controlled on Eliquis for AC  -Atrial Flutter ENT follow up Taper O2 requirement on TC 30%  -Awake no dyspnea on TC  -Trach tube removed still unable PO  03/15-Passed FEES -Regular diet      # Acute on Chronic Systolic Heart Failure now improved  EF-35% ,severe TR  Had HD post op  GDMT post op  LVEF improved post bypass   -TTE EF 40%,trace effusion  ECG c/w pericarditis-was on colchicine   01/15-TTE EF 55%  -TTE EF 60%   -Normal LV systolic function Moderate pericardial effusion  -On TC-HF and Vent support at nights   02/10-Vent HS with T Collar trial Ambulated Remains in AF  Repeat TTE Normal LV Systolic function Small Pericardial effusion decreased from 2/3        Vascular evaluated  AVGraft /?steal causing RV failure-'' Very low suspicion of steal Sd and AVF causing current clinical state. ''   VA Duplex Hemodialysis Access, Left (25 @ 13:35) >   Arterial flow volume of 1301 mL/m, and the venous flow volume of  1492 mL/m are consistent with a normally functioning dialysis access  fistula.      # Ascites  Hx chronic ascites  Has drainage q4-6 weeks  Last drained -4600mL  ? ascites related to severe TR  Had bwbcabfmggof-Ggexgid-kv growth   CT Abdomen 01/10-Large volume ascites-  US abdomen--Small to moderate volume abdominal/pelvic ascites  US abdomen  Moderate ascites  US Abdomen -Moderate 4 quad ascites  US Abdomen Limited 03/10-Moderate ascites throughout abdomen, with large pelvic ascites.      # ESRD  Now on  HD-MWF

## 2025-03-15 NOTE — PROGRESS NOTE ADULT - ASSESSMENT
54M with PMH of HTN, HLD, ESRD on HD MWF via LUE AVF, ascites (requiring repeat paracenteses q4-6wks), NICM with moderate LV dysfunction w/ EF 35-40%(2023) and CAD s/p atherectomy + SALLIE to D1(4/11/2024) presents to [facility] transferred from [facility] for CABG eval  Nephro on board for HD needs.    ESRD on HD   Nephrologist Dr. Bailey  [facility] Schedule / Access:  LUE AVF  Center: [facility]  Pt s/p HD on 2/19 and 2/21.  s/p HD 2/24 uf 1.5L  S/p HD on 02/26 2 L UF and 02/28 2 L UF again   S/p HD on 03/03 and 03/5, 03/07  S/p HD on 03/10 AND 03/12  S/p HD on 03/14 1.5 L removed  HD per MWF  Keep MAP>65  Renal Diet  HD consent in chart   Pending labs since 03/10    Hyper/Hypokalemia  Pt intermittently Hyperkalemia  HD as scheduled  now improved  Low K diet.  C/W Lokelma 10gm on nonHD days.  Monitor K.    HTN  BP fluctuating; controlled.  UF w/ HD as BP permits.  Low sodium diet.  Management per CTICU  Monitor BP     CKD MBD  Check PTH   Low PO4 diet.  Not on binder.  Monitor Ca , PO4 daily     Anemia  CRISTIANE with HD  Repeat iron studies.  Transfuse if hgb <7    CAD with multivessel disease  CTICU following  s/p CABG 12/30    Hypoxic respiratory failure   S/p tracheostomy 01/18 and decannulated on 03/11  Per surgery  Pulm following

## 2025-03-15 NOTE — PROGRESS NOTE ADULT - PROBLEM SELECTOR PLAN 2
Renal following   HD Monday / Wednesday / Friday  Continue Epogen 10,000 units IV TIW on Monday / Wednesday / Friday with HD

## 2025-03-15 NOTE — PROGRESS NOTE ADULT - SUBJECTIVE AND OBJECTIVE BOX
MR#[id number]  PATIENT NAME:RAAD CANO    DATE OF SERVICE: 03-15-25 @ 07:54  Patient was seen and examined by Solo Quick MD on    03-15-25 @ 07:54 .  Interim events noted.Consultant notes ,Labs,Telemetry reviewed by me       HOSPITAL COURSE: HPI:  54M with PMH of HTN, HLD, ESRD on HD MWF via LUE AVF, ascites (requiring repeat paracenteses q4-6wks), NICM with moderate LV dysfunction w/ EF 35-40%(2023) and CAD s/p atherectomy + SALLIE to D1(4/11/2024) presents to [facility] transferred from [facility].  Cardiac cath which showed pLAD 70% ISR, mLCx 70%, D1 severe dz.   Patient now transferred to [facility] CTS Dr. Rivers for CABG eval.     (24 Dec 2024 05:07)      INTERIM EVENTS:Patient seen at bedside ,interim events noted.  12/23 Cardiac cath  pLAD 70% ISR, mLCx 70%, D1 severe dz.   12/31-POD#1-C3L free LIMA-LAD; SVG-Diag; SVG-leftPL Awake OOB extubated Sinus rhythm on Dobutamine gtt  03/04-Awake Had FEES still NPO Atrial Flutter   03/06-Tx to SDU  03/07-Awake Atrial Flutter   03/08-Awake VSS Atrial Flutter still on NGT  03/09-Awake Atrial Flutter on TC 30% still has poor cough  03/10-Awake lying in bed Atrial Flutter on TC 30% for US Abdomen today  03/12-Still NPO Atrial Flutter no dyspnea seen by ENT  03/14-Trach removed  03/15-Awake Had FEES-passed for regular diet-AFl     AMBULATION: Assisted[ ] Cane/walker[x ] Independent[ ]          MEDICATIONS  (STANDING):  apixaban 2.5 milliGRAM(s) Oral two times a day  ascorbic acid 500 milliGRAM(s) Oral daily  aspirin  chewable 81 milliGRAM(s) Oral daily  atorvastatin 80 milliGRAM(s) Oral at bedtime  buDESOnide    Inhalation Suspension 0.5 milliGRAM(s) Inhalation two times a day  chlorhexidine 2% Cloths 1 Application(s) Topical daily  chlorhexidine 4% Liquid 1 Application(s) Topical <User Schedule>  dextrose 50% Injectable 50 milliLiter(s) IV Push every 15 minutes  epoetin ignacia (EPOGEN) Injectable 51211 Unit(s) IV Push <User Schedule>  ferrous    sulfate Liquid 300 milliGRAM(s) Enteral Tube daily  insulin lispro (ADMELOG) corrective regimen sliding scale   SubCutaneous three times a day before meals  melatonin 5 milliGRAM(s) Oral at bedtime  methocarbamol 500 milliGRAM(s) Oral every 8 hours  metoprolol tartrate 25 milliGRAM(s) Oral two times a day  mirtazapine 7.5 milliGRAM(s) Oral at bedtime  pantoprazole  Injectable 40 milliGRAM(s) IV Push daily  sodium chloride 0.65% Nasal 1 Spray(s) Both Nostrils every 12 hours  sodium chloride 0.9% for Nebulization 3 milliLiter(s) Nebulizer every 6 hours  sodium chloride 0.9%. 1000 milliLiter(s) (10 mL/Hr) IV Continuous <Continuous>  sodium zirconium cyclosilicate 10 Gram(s) Oral <User Schedule>  traZODone 100 milliGRAM(s) Oral at bedtime    MEDICATIONS  (PRN):  acetaminophen     Tablet .. 650 milliGRAM(s) Oral every 6 hours PRN Mild Pain (1 - 3)  benzocaine/menthol Lozenge 1 Lozenge Oral every 4 hours PRN Sore Throat  lidocaine/prilocaine Cream 1 Application(s) Topical daily PRN pre-HD  sodium chloride 0.9% lock flush 10 milliLiter(s) IV Push every 1 hour PRN Pre/post blood products, medications, blood draw, and to maintain line patency            REVIEW OF SYSTEMS:  Constitutional: [ ] fever, [ ]weight loss,  [ ]fatigue [ ]weight gain  Eyes: [ ] visual changes  Respiratory: [ ]shortness of breath;  [ ] cough, [ ]wheezing, [ ]chills, [ ]hemoptysis  Cardiovascular: [ ] chest pain, [ ]palpitations, [ ]dizziness,  [ ]leg swelling[ ]orthopnea[ ]PND  Gastrointestinal: [ ] abdominal pain, [ ]nausea, [ ]vomiting,  [ ]diarrhea [ ]Constipation [ ]Melena  Genitourinary: [ ] dysuria, [ ] hematuria [ ]Vigil  Neurologic: [ ] headaches [ ] tremors[ ]weakness [ ]Paralysis Right[ ] Left[ ]  Skin: [ ] itching, [ ]burning, [ ] rashes  Endocrine: [ ] heat or cold intolerance  Musculoskeletal: [ ] joint pain or swelling; [ ] muscle, back, or extremity pain  Psychiatric: [ ] depression, [ ]anxiety, [ ]mood swings, or [ ]difficulty sleeping  Hematologic: [ ] easy bruising, [ ] bleeding gums    [ ] All remaining systems negative except as per above.   [ ]Unable to obtain.  [x] No change in ROS since admission      Vital Signs Last 24 Hrs  T(C): 36.5 (15 Mar 2025 06:30), Max: 37.2 (14 Mar 2025 15:15)  T(F): 97.7 (15 Mar 2025 06:30), Max: 98.9 (14 Mar 2025 15:15)  HR: 64 (15 Mar 2025 06:30) (64 - 135)  BP: 134/60 (15 Mar 2025 06:30) (109/53 - 150/61)  BP(mean): 87 (15 Mar 2025 06:30) (76 - 93)  RR: 19 (15 Mar 2025 06:30) (18 - 19)  SpO2: 97% (15 Mar 2025 06:30) (95% - 100%)    Parameters below as of 14 Mar 2025 23:13  Patient On (Oxygen Delivery Method): nasal cannula  O2 Flow (L/min): 2    I&O's Summary    14 Mar 2025 07:01  -  15 Mar 2025 07:00  --------------------------------------------------------  IN: 1450 mL / OUT: 2300 mL / NET: -850 mL        PHYSICAL EXAM:  General: No acute distress BMI-28  HEENT: EOMI, PERRL  Neck: Supple, [ ] JVD  Lungs: Equal air entry bilaterally; [ ] rales [ ] wheezing [ ] rhonchi  Heart: Regular rate and rhythm; [x ] murmur   2/6 [ x] systolic [ ] diastolic [ ] radiation[ ] rubs [ ]  gallops  Abdomen: Nontender, bowel sounds present  Extremities: No clubbing, cyanosis, [ ] edema [ ]Pulses  equal and intact  Nervous system:  Alert & Oriented X3, no focal deficits  Psychiatric: Normal affect  Skin: No rashes or lesions    LABS:  03-14    137  |  97  |  38[H]  ----------------------------<  163[H]  4.3   |  24  |  5.45[H]    Ca    8.7      14 Mar 2025 15:18    TPro  6.8  /  Alb  2.9[L]  /  TBili  0.4  /  DBili  x   /  AST  16  /  ALT  7[L]  /  AlkPhos  106  03-14    Creatinine Trend: 5.45<--, 5.55<--, 6.17<--, 5.31<--, 4.92<--, 3.99<--                        10.2   14.82 )-----------( 233      ( 14 Mar 2025 15:18 )             31.6           Xray Chest 1 View- PORTABLE-Urgent (Xray Chest 1 View- PORTABLE-Urgent .) (03.13.25 @ 08:14) >  IMPRESSION:  Interval removal of tracheostomy tube.  No focal consolidation.

## 2025-03-15 NOTE — PROGRESS NOTE ADULT - ASSESSMENT
53 yo M with multiple medical problems including CAD s/p PCI in 2024 s/p CABG x 3 on 24    1/: Intubated for hypoxia & left lung white out s/p awake bronch with removal of copious mucopurulent secretions  : s/p IABP insertion, sepsis/septic shock due to E coli   ESBL pneumonia, collapsed Left Lung, s/p multiple bronch    tracheostomy tube placement    VRE E. Faecium Bcx   +HSV PCR BAL   tissue cx from back abscess - Serratia/MRSA  - - intermittent bradycardia/junctional episodes with hypotension  2/3: off , off theophylline. iHD 1L UF  : bronch for Left lung collapse wound vac, 2decho w/ moderate pericardial effusion - similar as before, US abd: small - moderate ascites - monitor at this time.  Wound vac placed for sacral wounds  : iHD-1L UF  : bronch today off positive pressure   : concern for left food drop   2/10 : no acute issues - CXR shows improving left sided aeration, pt subjectively reports having difficulty while lying flat  : s/p Paracentesis 3.5 L removed, leukocytosis   : Ascites fluid PMN < 250 (less likely SBP)   sniff test yesterday showed paradoxical diaphragmatic motion    : mucus plugging but able to clear airway with aggressive pulmonary toileting.   : no vent requirement overnight, able to mobilize secretion with pulm toileting  hyperkalemia post HD - workup for possible recirculation underway   : On TC X 48 hours - ambulating well, no mucus plugging events  : HFTC x24hrs (40L 30%)  : iHD 1.5L UF  : iHD 2L  : trach downsized to 7, bronch  - cultures growing ESBL E Coli, sacral wound vac changed   3/6 Transfer to SDU, +PW isolated, +sacral wound vac in place, + Tube feeds running, next HD scheduled for tomorrow   3/7 VSS; Continue with current medication regimen.  Continue on TC 40% 02.  Plan for HD today.  Nocturnal feeding.  3/8  FEES 3/3 notable for poor secretion management evidenced unable to clear 2/2 weak cough B/l vocal cords appeared to be adducted at midline. ENT eval demonstrates  limited vocal cord abduction vs. questionable vocal cord paresis--no plan for ENT intervention at this time, No PMV . Local sacral wound care by PT.   3/9 HD tomorrow, US abd in am Tolerating pm feeds  3/10 US abd and HD today. CXR PA/LAT. Consider ENT to reassess VC paresis feasibility of decannulation   Disposition: Home PT / OT     3/11  suction prn.  OOB to chair.  Eliquis for a flutter.  ENT note appreciated  Maintain npo, S&S f/u  3/12 The pt is for HD today, plan on trach decannulation after dialysis. Suction x 1 this am for minimal secretions,   3/13 VSS speech and swallow evaluation today  labs with HD shower today  3/14 VSS for FEES today= Passed regular diet   HD today  3/15 needs encouragement for OOB to chair --> educated ALL MEAL to be OOB in chair. encouraged increase ambulation as tolerated. MARIA ELENA shahid Chest PT prn.

## 2025-03-15 NOTE — PROGRESS NOTE ADULT - SUBJECTIVE AND OBJECTIVE BOX
SUBJECTIVE: "Hi." Denies acute chest pain, palpitations, or shortness of breath    TELEMETRY:  afib    Vital Signs Last 24 Hrs  T(C): 36.6 (03-15-25 @ 08:28), Max: 37.2 (25 @ 15:15)  T(F): 97.8 (03-15-25 @ 08:28), Max: 98.9 (25 @ 15:15)  HR: 67 (03-15-25 @ 08:28) (64 - 135)  BP: 133/60 (03-15-25 @ 08:28) (109/53 - 150/61)  RR: 18 (03-15-25 @ 08:28) (18 - 19)  SpO2: 99% (03-15-25 @ 08:28) (95% - 100%)           INPUT/OUTPUT:   @ 07:01  -  03-15 @ 07:00  --------------------------------------------------------  IN: 1450 mL / OUT: 2300 mL / NET: -850 mL      LABS:  *labs on HD days per Dr. Rivers           Daily Weight in k (14 Mar 2025 18:45)      CAPILLARY BLOOD GLUCOSE  POCT Blood Glucose.: 99 mg/dL (14 Mar 2025 17:21)      PHYSICAL EXAM:  Neurology: alert and oriented x 3, nonfocal, no gross deficits  CV : S1S2; (+) PW (isolated)  Sternal Wound :  FERDINAND sternum stable  Lungs: nonlabored respirations with no use of accessory muscles  Abdomen: soft, nontender, distended, positive bowel sounds  :   HD via AVF          Extremities:     warm well perfused equal strength throughout ; (+) B/LE LE edema, denies calf tenderness, +peripheral pulses  (+) sacral vac      ACTIVE MEDICATIONS:  acetaminophen     Tablet .. 650 milliGRAM(s) Oral every 6 hours PRN  apixaban 2.5 milliGRAM(s) Oral two times a day  ascorbic acid 500 milliGRAM(s) Oral daily  aspirin  chewable 81 milliGRAM(s) Oral daily  atorvastatin 80 milliGRAM(s) Oral at bedtime  benzocaine/menthol Lozenge 1 Lozenge Oral every 4 hours PRN  buDESOnide    Inhalation Suspension 0.5 milliGRAM(s) Inhalation two times a day  chlorhexidine 2% Cloths 1 Application(s) Topical daily  chlorhexidine 4% Liquid 1 Application(s) Topical <User Schedule>  dextrose 50% Injectable 50 milliLiter(s) IV Push every 15 minutes  epoetin ignacia (EPOGEN) Injectable 62217 Unit(s) IV Push <User Schedule>  ferrous    sulfate Liquid 300 milliGRAM(s) Enteral Tube daily  HYDROmorphone  Injectable 0.5 milliGRAM(s) IV Push once  insulin lispro (ADMELOG) corrective regimen sliding scale   SubCutaneous three times a day before meals  lidocaine/prilocaine Cream 1 Application(s) Topical daily PRN  melatonin 5 milliGRAM(s) Oral at bedtime  methocarbamol 500 milliGRAM(s) Oral every 8 hours  metoprolol tartrate 25 milliGRAM(s) Oral two times a day  mirtazapine 7.5 milliGRAM(s) Oral at bedtime  pantoprazole  Injectable 40 milliGRAM(s) IV Push daily  sodium chloride 0.65% Nasal 1 Spray(s) Both Nostrils every 12 hours  sodium chloride 0.9% for Nebulization 3 milliLiter(s) Nebulizer every 6 hours  sodium chloride 0.9% lock flush 10 milliLiter(s) IV Push every 1 hour PRN  sodium chloride 0.9%. 1000 milliLiter(s) IV Continuous <Continuous>  sodium zirconium cyclosilicate 10 Gram(s) Oral <User Schedule>  traZODone 100 milliGRAM(s) Oral at bedtime    Case discussed in detail with Cardiothoracic Team and Attending Dr. Rivers. Plan below as per discussion.

## 2025-03-15 NOTE — PROGRESS NOTE ADULT - PROBLEM SELECTOR PLAN 6
ENT consult recalled to re-evaluate   speech and swallow eval 3/13  3/14 FEES today= Passed regular diet  all meals OOB in chair

## 2025-03-15 NOTE — PROGRESS NOTE ADULT - SUBJECTIVE AND OBJECTIVE BOX
[facility]    Dr. Nica Styles     Office [phone] (9 am to 5 pm)  Service: [phone] (5pm to 9am)  Also Available on TEAMS      RENAL PROGRESS NOTE: DATE OF SERVICE 03-15-25 @ 12:34    Patient is a 54y old  Male who presents with a chief complaint of CAD s/p CABG (15 Mar 2025 07:54)      Patient seen and examined at bedside. No chest pain/sob    VITALS:  T(F): 97.9 (03-15-25 @ 11:32), Max: 98.9 (03-14-25 @ 15:15)  HR: 66 (03-15-25 @ 11:32)  BP: 137/60 (03-15-25 @ 11:32)  RR: 18 (03-15-25 @ 11:32)  SpO2: 97% (03-15-25 @ 11:32)  Wt(kg): --    03-14 @ 07:01  -  03-15 @ 07:00  --------------------------------------------------------  IN: 1450 mL / OUT: 2300 mL / NET: -850 mL    03-15 @ 07:01  -  03-15 @ 12:34  --------------------------------------------------------  IN: 600 mL / OUT: 0 mL / NET: 600 mL          PHYSICAL EXAM:  Constitutional: NAD  Neck: No JVD  Respiratory: CTAB, no wheezes, rales or rhonchi  Cardiovascular: S1, S2, RRR  Gastrointestinal: BS+, soft, NT/ND  Extremities: No peripheral edema    Hospital Medications:   MEDICATIONS  (STANDING):  apixaban 2.5 milliGRAM(s) Oral two times a day  ascorbic acid 500 milliGRAM(s) Oral daily  aspirin  chewable 81 milliGRAM(s) Oral daily  atorvastatin 80 milliGRAM(s) Oral at bedtime  buDESOnide    Inhalation Suspension 0.5 milliGRAM(s) Inhalation two times a day  chlorhexidine 2% Cloths 1 Application(s) Topical daily  chlorhexidine 4% Liquid 1 Application(s) Topical <User Schedule>  dextrose 50% Injectable 50 milliLiter(s) IV Push every 15 minutes  epoetin ignacia (EPOGEN) Injectable 24226 Unit(s) IV Push <User Schedule>  ferrous    sulfate Liquid 300 milliGRAM(s) Enteral Tube daily  insulin lispro (ADMELOG) corrective regimen sliding scale   SubCutaneous three times a day before meals  melatonin 5 milliGRAM(s) Oral at bedtime  methocarbamol 500 milliGRAM(s) Oral every 8 hours  metoprolol tartrate 25 milliGRAM(s) Oral two times a day  mirtazapine 7.5 milliGRAM(s) Oral at bedtime  pantoprazole  Injectable 40 milliGRAM(s) IV Push daily  sodium chloride 0.65% Nasal 1 Spray(s) Both Nostrils every 12 hours  sodium chloride 0.9% for Nebulization 3 milliLiter(s) Nebulizer every 6 hours  sodium chloride 0.9%. 1000 milliLiter(s) (10 mL/Hr) IV Continuous <Continuous>  sodium zirconium cyclosilicate 10 Gram(s) Oral <User Schedule>  traZODone 100 milliGRAM(s) Oral at bedtime      LABS:  03-14    137  |  97  |  38[H]  ----------------------------<  163[H]  4.3   |  24  |  5.45[H]    Ca    8.7      14 Mar 2025 15:18    TPro  6.8  /  Alb  2.9[L]  /  TBili  0.4  /  DBili      /  AST  16  /  ALT  7[L]  /  AlkPhos  106  03-14    Creatinine Trend: 5.45 <--, 5.55 <--, 6.17 <--    Albumin: 2.9 g/dL (03-14 @ 15:18)                              10.2   14.82 )-----------( 233      ( 14 Mar 2025 15:18 )             31.6     Urine Studies:  Urinalysis - [03-14-25 @ 15:18]      Color  / Appearance  / SG  / pH       Gluc 163 / Ketone   / Bili  / Urobili        Blood  / Protein  / Leuk Est  / Nitrite       RBC  / WBC  / Hyaline  / Gran  / Sq Epi  / Non Sq Epi  / Bacteria       Iron 29, TIBC 143, %sat 20      [12-19-24 @ 05:00]  Ferritin 904      [12-19-24 @ 05:00]  TSH 4.75      [12-24-24 @ 06:50]    HBsAg Nonreact      [03-14-25 @ 15:50]      RADIOLOGY & ADDITIONAL STUDIES:

## 2025-03-16 LAB
HAV IGM SER-ACNC: SIGNIFICANT CHANGE UP
HBV CORE AB SER-ACNC: SIGNIFICANT CHANGE UP
HBV CORE IGM SER-ACNC: SIGNIFICANT CHANGE UP
HBV SURFACE AG SER-ACNC: SIGNIFICANT CHANGE UP
HCV AB S/CO SERPL IA: 0.46 S/CO — SIGNIFICANT CHANGE UP (ref 0–0.79)
HCV AB SERPL-IMP: SIGNIFICANT CHANGE UP

## 2025-03-16 PROCEDURE — 71045 X-RAY EXAM CHEST 1 VIEW: CPT | Mod: 26

## 2025-03-16 RX ORDER — HYDROMORPHONE/SOD CHLOR,ISO/PF 2 MG/10 ML
0.5 SYRINGE (ML) INJECTION ONCE
Refills: 0 | Status: DISCONTINUED | OUTPATIENT
Start: 2025-03-16 | End: 2025-03-16

## 2025-03-16 RX ORDER — OXYCODONE HYDROCHLORIDE 30 MG/1
10 TABLET ORAL ONCE
Refills: 0 | Status: DISCONTINUED | OUTPATIENT
Start: 2025-03-16 | End: 2025-03-16

## 2025-03-16 RX ORDER — OXYCODONE HYDROCHLORIDE 30 MG/1
5 TABLET ORAL EVERY 4 HOURS
Refills: 0 | Status: DISCONTINUED | OUTPATIENT
Start: 2025-03-16 | End: 2025-03-19

## 2025-03-16 RX ORDER — OXYCODONE HYDROCHLORIDE 30 MG/1
10 TABLET ORAL EVERY 6 HOURS
Refills: 0 | Status: DISCONTINUED | OUTPATIENT
Start: 2025-03-16 | End: 2025-03-19

## 2025-03-16 RX ORDER — SIMETHICONE 80 MG
80 TABLET,CHEWABLE ORAL THREE TIMES A DAY
Refills: 0 | Status: DISCONTINUED | OUTPATIENT
Start: 2025-03-16 | End: 2025-03-19

## 2025-03-16 RX ADMIN — Medication 80 MILLIGRAM(S): at 04:30

## 2025-03-16 RX ADMIN — Medication 3 MILLILITER(S): at 00:31

## 2025-03-16 RX ADMIN — Medication 3 MILLILITER(S): at 12:47

## 2025-03-16 RX ADMIN — APIXABAN 2.5 MILLIGRAM(S): 2.5 TABLET, FILM COATED ORAL at 17:23

## 2025-03-16 RX ADMIN — Medication 1 SPRAY(S): at 18:57

## 2025-03-16 RX ADMIN — Medication 3 MILLILITER(S): at 18:40

## 2025-03-16 RX ADMIN — INSULIN LISPRO 2: 100 INJECTION, SOLUTION INTRAVENOUS; SUBCUTANEOUS at 12:43

## 2025-03-16 RX ADMIN — OXYCODONE HYDROCHLORIDE 10 MILLIGRAM(S): 30 TABLET ORAL at 05:00

## 2025-03-16 RX ADMIN — OXYCODONE HYDROCHLORIDE 5 MILLIGRAM(S): 30 TABLET ORAL at 16:14

## 2025-03-16 RX ADMIN — OXYCODONE HYDROCHLORIDE 10 MILLIGRAM(S): 30 TABLET ORAL at 00:30

## 2025-03-16 RX ADMIN — Medication 1 SPRAY(S): at 05:58

## 2025-03-16 RX ADMIN — APIXABAN 2.5 MILLIGRAM(S): 2.5 TABLET, FILM COATED ORAL at 05:58

## 2025-03-16 RX ADMIN — Medication 0.5 MILLIGRAM(S): at 19:59

## 2025-03-16 RX ADMIN — METHOCARBAMOL 500 MILLIGRAM(S): 500 TABLET, FILM COATED ORAL at 22:04

## 2025-03-16 RX ADMIN — Medication 81 MILLIGRAM(S): at 11:04

## 2025-03-16 RX ADMIN — OXYCODONE HYDROCHLORIDE 10 MILLIGRAM(S): 30 TABLET ORAL at 04:30

## 2025-03-16 RX ADMIN — Medication 500 MILLIGRAM(S): at 11:04

## 2025-03-16 RX ADMIN — METHOCARBAMOL 500 MILLIGRAM(S): 500 TABLET, FILM COATED ORAL at 13:22

## 2025-03-16 RX ADMIN — OXYCODONE HYDROCHLORIDE 10 MILLIGRAM(S): 30 TABLET ORAL at 01:00

## 2025-03-16 RX ADMIN — INSULIN LISPRO 2: 100 INJECTION, SOLUTION INTRAVENOUS; SUBCUTANEOUS at 08:09

## 2025-03-16 RX ADMIN — METHOCARBAMOL 500 MILLIGRAM(S): 500 TABLET, FILM COATED ORAL at 05:57

## 2025-03-16 RX ADMIN — Medication 1 APPLICATION(S): at 06:01

## 2025-03-16 RX ADMIN — Medication 0.5 MILLIGRAM(S): at 08:40

## 2025-03-16 RX ADMIN — ATORVASTATIN CALCIUM 80 MILLIGRAM(S): 80 TABLET, FILM COATED ORAL at 22:03

## 2025-03-16 RX ADMIN — METOPROLOL SUCCINATE 25 MILLIGRAM(S): 50 TABLET, EXTENDED RELEASE ORAL at 17:24

## 2025-03-16 RX ADMIN — INSULIN LISPRO 2: 100 INJECTION, SOLUTION INTRAVENOUS; SUBCUTANEOUS at 16:51

## 2025-03-16 RX ADMIN — Medication 0.5 MILLIGRAM(S): at 08:25

## 2025-03-16 RX ADMIN — Medication 0.5 MILLIGRAM(S): at 20:14

## 2025-03-16 RX ADMIN — MIRTAZAPINE 7.5 MILLIGRAM(S): 30 TABLET, FILM COATED ORAL at 22:04

## 2025-03-16 RX ADMIN — METOPROLOL SUCCINATE 25 MILLIGRAM(S): 50 TABLET, EXTENDED RELEASE ORAL at 05:58

## 2025-03-16 RX ADMIN — Medication 300 MILLIGRAM(S): at 11:04

## 2025-03-16 RX ADMIN — OXYCODONE HYDROCHLORIDE 5 MILLIGRAM(S): 30 TABLET ORAL at 16:44

## 2025-03-16 RX ADMIN — BUDESONIDE 0.5 MILLIGRAM(S): 0.25 SUSPENSION RESPIRATORY (INHALATION) at 18:57

## 2025-03-16 NOTE — PROGRESS NOTE ADULT - ASSESSMENT
54M with PMH of HTN, HLD, ESRD on HD MWF via LUE AVF, ascites (requiring repeat paracenteses q4-6wks), NICM with moderate LV dysfunction w/ EF 35-40%() and CAD s/p atherectomy + SALLIE to D1(2024) presents to [facility] transferred from [facility].  Cardiac cath which showed pLAD 70% ISR, mLCx 70%, D1 severe dz.   Patient now transferred to [facility] CTS Dr. Rivers for CABG eval      # CAD s/p NSTEMI  Hx CAD s/p PCI LAD   Presented with ADHF elevated troponins  Positive NST1-Cath- pLAD 70% ISR, mLCx 70%, D1 severe dz.   TTE EF 35% Severe TRWas on Plavix-p2y12- 321 been off Plavix since -POD#1-C3L free LIMA-LAD; SVG-Diag; OOF-udhxHB-Wpogqlect on  Sinus rhythm,Amio for AF prophylaxis   Atrial Flutter on TC during day Ambulating  -Increased congestion on CXR had HD yesterday remains in AFl   -Atrial Flutter HFTC 40L/30% BP stable     -Atrial Flutter tolerating TC for HD MWF-consider DOAC   OOB Abd US moderate 4 quad ascites noted remains in Atrial Flutter~~70's   TC Atrial Flutter  -s/p Bronch/BAL remains in AFl  Trach downsized 7   -Tolerating TC AFl rate controlled On Heparin gtt transition to DOAC  -Tx to SDU on Eliquis Atrial Flutter  -VSS Atrial Flutter rate controlled on Eliquis for AC  -Atrial Flutter ENT follow up Taper O2 requirement on TC 30%  -Awake no dyspnea on TC  -Trach tube removed still unable PO  03/15-Passed FEES -Regular diet   Encotage OOB-Chair ambulate remains in Atrial Flutter rate controlled      # Acute on Chronic Systolic Heart Failure now improved  EF-35% ,severe TR  Had HD post op  GDMT post op  LVEF improved post bypass   -TTE EF 40%,trace effusion  ECG c/w pericarditis-was on colchicine   01/15-TTE EF 55%  -TTE EF 60%   -Normal LV systolic function Moderate pericardial effusion  -On TC-HF and Vent support at nights   02/10-Vent HS with T Collar trial Ambulated Remains in AF  Repeat TTE Normal LV Systolic function Small Pericardial effusion decreased from 2/3        Vascular evaluated  AVGraft /?steal causing RV failure-'' Very low suspicion of steal Sd and AVF causing current clinical state. ''   VA Duplex Hemodialysis Access, Left (25 @ 13:35) >   Arterial flow volume of 1301 mL/m, and the venous flow volume of  1492 mL/m are consistent with a normally functioning dialysis access  fistula.      # Ascites  Hx chronic ascites  Has drainage q4-6 weeks  Last drained -4600mL  ? ascites related to severe TR  Had qvcakpfvegni-Ffcnylu-et growth   CT Abdomen 01/10-Large volume ascites-  US abdomen--Small to moderate volume abdominal/pelvic ascites  US abdomen  Moderate ascites  US Abdomen -Moderate 4 quad ascites  US Abdomen Limited 03/10-Moderate ascites throughout abdomen, with large pelvic ascites.      # ESRD  Now on  HD-MWF

## 2025-03-16 NOTE — PROGRESS NOTE ADULT - ASSESSMENT
54M with PMH of HTN, HLD, ESRD on HD MWF via LUE AVF, ascites (requiring repeat paracenteses q4-6wks), NICM with moderate LV dysfunction w/ EF 35-40%(2023) and CAD s/p atherectomy + SALLIE to D1(4/11/2024) presents to [facility] transferred from [facility] for CABG eval  Nephro on board for HD needs.    ESRD on HD   Nephrologist Dr. Bailey  [facility] Schedule / Access:  LUE AVF  Center: [facility]  Pt s/p HD on 2/19 and 2/21.  s/p HD 2/24 uf 1.5L  S/p HD on 02/26 2 L UF and 02/28 2 L UF again   S/p HD on 03/03 and 03/5, 03/07  S/p HD on 03/10 AND 03/12  S/p HD on 03/14 1.5 L removed  HD TOMM  Keep MAP>65  Renal Diet  HD consent in chart   Pending labs since 03/10    Hyper/Hypokalemia  Pt intermittently Hyperkalemia  HD as scheduled  now improved  Low K diet.  C/W Lokelma 10gm on nonHD days.  Monitor K.    HTN  BP fluctuating; controlled.  UF w/ HD as BP permits.  Low sodium diet.  Management per CTICU  Monitor BP     CKD MBD  Check PTH   Low PO4 diet.  Not on binder.  Monitor Ca , PO4 daily     Anemia  CRISTIANE with HD  Repeat iron studies.  Transfuse if hgb <7    CAD with multivessel disease  CTICU following  s/p CABG 12/30    Hypoxic respiratory failure   S/p tracheostomy 01/18 and decannulated on 03/11  Per surgery  Pulm following

## 2025-03-16 NOTE — PROGRESS NOTE ADULT - SUBJECTIVE AND OBJECTIVE BOX
[facility]    Dr. Nica Styles     Office [phone] (9 am to 5 pm)  Service: [phone] (5pm to 9am)  Also Available on TEAMS      RENAL PROGRESS NOTE: DATE OF SERVICE 03-16-25 @ 12:16    Patient is a 54y old  Male who presents with a chief complaint of CAD s/p CABG (16 Mar 2025 07:35)      Patient seen and examined at bedside. No chest pain/sob    VITALS:  T(F): 98.3 (03-16-25 @ 08:17), Max: 98.7 (03-16-25 @ 06:00)  HR: 66 (03-16-25 @ 10:53)  BP: 109/51 (03-16-25 @ 10:53)  RR: 18 (03-16-25 @ 10:53)  SpO2: 100% (03-16-25 @ 10:53)  Wt(kg): --    03-15 @ 07:01  -  03-16 @ 07:00  --------------------------------------------------------  IN: 900 mL / OUT: 1 mL / NET: 899 mL          PHYSICAL EXAM:  Constitutional: NAD  Neck: No JVD  Respiratory: CTAB, no wheezes, rales or rhonchi  Cardiovascular: S1, S2, RRR  Gastrointestinal: BS+, soft, NT/ND  Extremities: No peripheral edema    Hospital Medications:   MEDICATIONS  (STANDING):  apixaban 2.5 milliGRAM(s) Oral two times a day  ascorbic acid 500 milliGRAM(s) Oral daily  aspirin  chewable 81 milliGRAM(s) Oral daily  atorvastatin 80 milliGRAM(s) Oral at bedtime  buDESOnide    Inhalation Suspension 0.5 milliGRAM(s) Inhalation two times a day  chlorhexidine 4% Liquid 1 Application(s) Topical <User Schedule>  dextrose 50% Injectable 50 milliLiter(s) IV Push every 15 minutes  dextrose 50% Injectable 25 Gram(s) IV Push once  epoetin ignacia (EPOGEN) Injectable 26791 Unit(s) IV Push <User Schedule>  ferrous    sulfate Liquid 300 milliGRAM(s) Enteral Tube daily  insulin lispro (ADMELOG) corrective regimen sliding scale   SubCutaneous three times a day before meals  insulin lispro (ADMELOG) corrective regimen sliding scale   SubCutaneous three times a day before meals  insulin lispro (ADMELOG) corrective regimen sliding scale   SubCutaneous at bedtime  melatonin 5 milliGRAM(s) Oral at bedtime  methocarbamol 500 milliGRAM(s) Oral every 8 hours  metoprolol tartrate 25 milliGRAM(s) Oral two times a day  mirtazapine 7.5 milliGRAM(s) Oral at bedtime  sodium chloride 0.65% Nasal 1 Spray(s) Both Nostrils every 12 hours  sodium chloride 0.9% for Nebulization 3 milliLiter(s) Nebulizer every 6 hours  sodium zirconium cyclosilicate 10 Gram(s) Oral <User Schedule>  traZODone 100 milliGRAM(s) Oral at bedtime      LABS:  03-14    137  |  97  |  38[H]  ----------------------------<  163[H]  4.3   |  24  |  5.45[H]    Ca    8.7      14 Mar 2025 15:18    TPro  6.8  /  Alb  2.9[L]  /  TBili  0.4  /  DBili      /  AST  16  /  ALT  7[L]  /  AlkPhos  106  03-14    Creatinine Trend: 5.45 <--, 5.55 <--, 6.17 <--                                10.2   14.82 )-----------( 233      ( 14 Mar 2025 15:18 )             31.6     Urine Studies:  Urinalysis - [03-14-25 @ 15:18]      Color  / Appearance  / SG  / pH       Gluc 163 / Ketone   / Bili  / Urobili        Blood  / Protein  / Leuk Est  / Nitrite       RBC  / WBC  / Hyaline  / Gran  / Sq Epi  / Non Sq Epi  / Bacteria       Iron 29, TIBC 143, %sat 20      [12-19-24 @ 05:00]  Ferritin 904      [12-19-24 @ 05:00]  TSH 4.75      [12-24-24 @ 06:50]    HBsAg Nonreact      [03-14-25 @ 15:50]      RADIOLOGY & ADDITIONAL STUDIES:

## 2025-03-16 NOTE — PROGRESS NOTE ADULT - SUBJECTIVE AND OBJECTIVE BOX
SUBJECTIVE:  "Hi." Denies acute chest pain, palpitations, or shortness of breath    TELEMETRY:  afib    Vital Signs Last 24 Hrs  T(C): 36.8 (25 @ 08:17), Max: 37.1 (25 @ 06:00)  T(F): 98.3 (25 @ 08:17), Max: 98.7 (25 @ 06:00)  HR: 68 (25 08:17) (66 - 113)  BP: 140/60 (25 @ 08:17) (126/91 - 154/64)  RR: 19 (25 @ 08:17) (16 - 19)  SpO2: 100% (25 @ 08:17) (94% - 100%)           INPUT/OUTPUT:  03-15 @ 07:01  -   @ 07:00  --------------------------------------------------------  IN: 900 mL / OUT: 1 mL / NET: 899 mL      LABS:    137  |  97  |  38[H]  ----------------------------<  163[H]  4.3   |  24  |  5.45[H]  Ca    8.7      14 Mar 2025 15:18  TPro  6.8  /  Alb  2.9[L]  /  TBili  0.4  /  DBili  x   /  AST  16  /  ALT  7[L]  /  AlkPhos  106                        10.2   14.82 )-----------( 233      ( 14 Mar 2025 15:18 )             31.6        Daily Weight in k.2 (16 Mar 2025 08:17)      CAPILLARY BLOOD GLUCOSE  POCT Blood Glucose.: 170 mg/dL (16 Mar 2025 07:35)  POCT Blood Glucose.: 147 mg/dL (15 Mar 2025 21:11)  POCT Blood Glucose.: 175 mg/dL (15 Mar 2025 11:31)      PHYSICAL EXAM:  Neurology: alert and oriented x 3, nonfocal, no gross deficits  CV : S1S2; (+) PW (isolated)  Sternal Wound :  FERDINAND sternum stable  Lungs: nonlabored respirations with no use of accessory muscles  Abdomen: soft, nontender, distended, positive bowel sounds  :   HD via AVF          Extremities:     warm well perfused equal strength throughout ; (+) B/LE LE edema, denies calf tenderness, +peripheral pulses  (+) sacral vac      ACTIVE MEDICATIONS:  acetaminophen     Tablet .. 650 milliGRAM(s) Oral every 6 hours PRN  apixaban 2.5 milliGRAM(s) Oral two times a day  ascorbic acid 500 milliGRAM(s) Oral daily  aspirin  chewable 81 milliGRAM(s) Oral daily  atorvastatin 80 milliGRAM(s) Oral at bedtime  benzocaine/menthol Lozenge 1 Lozenge Oral every 4 hours PRN  buDESOnide    Inhalation Suspension 0.5 milliGRAM(s) Inhalation two times a day  chlorhexidine 4% Liquid 1 Application(s) Topical <User Schedule>  dextrose 50% Injectable 50 milliLiter(s) IV Push every 15 minutes  dextrose 50% Injectable 25 Gram(s) IV Push once  epoetin ingacia (EPOGEN) Injectable 08402 Unit(s) IV Push <User Schedule>  ferrous    sulfate Liquid 300 milliGRAM(s) Enteral Tube daily  insulin lispro (ADMELOG) corrective regimen sliding scale   SubCutaneous three times a day before meals  insulin lispro (ADMELOG) corrective regimen sliding scale   SubCutaneous three times a day before meals  insulin lispro (ADMELOG) corrective regimen sliding scale   SubCutaneous at bedtime  lidocaine/prilocaine Cream 1 Application(s) Topical daily PRN  melatonin 5 milliGRAM(s) Oral at bedtime  methocarbamol 500 milliGRAM(s) Oral every 8 hours  metoprolol tartrate 25 milliGRAM(s) Oral two times a day  mirtazapine 7.5 milliGRAM(s) Oral at bedtime  oxyCODONE    IR 10 milliGRAM(s) Oral every 6 hours PRN  oxyCODONE    IR 5 milliGRAM(s) Oral every 4 hours PRN  simethicone 80 milliGRAM(s) Chew three times a day PRN  sodium chloride 0.65% Nasal 1 Spray(s) Both Nostrils every 12 hours  sodium chloride 0.9% for Nebulization 3 milliLiter(s) Nebulizer every 6 hours  sodium chloride 0.9% lock flush 10 milliLiter(s) IV Push every 1 hour PRN  sodium zirconium cyclosilicate 10 Gram(s) Oral <User Schedule>  traZODone 100 milliGRAM(s) Oral at bedtime    Case discussed in detail with Cardiothoracic Team and on-call Attending Dr. Rivers. Plan below as per discussion. SUBJECTIVE:  "Hi." Denies acute chest pain, palpitations, or shortness of breath    TELEMETRY:  afib    Vital Signs Last 24 Hrs  T(C): 36.8 (25 @ 08:17), Max: 37.1 (25 @ 06:00)  T(F): 98.3 (25 @ 08:17), Max: 98.7 (25 @ 06:00)  HR: 68 (25 @ 08:17) (66 - 113)  BP: 140/60 (25 @ 08:17) (126/91 - 154/64)  RR: 19 (25 @ 08:17) (16 - 19)  SpO2: 100% (25 @ 08:17) (94% - 100%)           INPUT/OUTPUT:  03-15 @ 07:01  -   @ 07:00  --------------------------------------------------------  IN: 900 mL / OUT: 1 mL / NET: 899 mL      LABS:  *Labs on HD days, per Dr. Rivers       Daily Weight in k.2 (16 Mar 2025 08:17)      CAPILLARY BLOOD GLUCOSE  POCT Blood Glucose.: 170 mg/dL (16 Mar 2025 07:35)  POCT Blood Glucose.: 147 mg/dL (15 Mar 2025 21:11)  POCT Blood Glucose.: 175 mg/dL (15 Mar 2025 11:31)      PHYSICAL EXAM:  Neurology: alert and oriented x 3, nonfocal, no gross deficits  CV : S1S2; (+) PW (isolated)  Sternal Wound :  FERDINAND sternum stable  Lungs: nonlabored respirations with no use of accessory muscles  Abdomen: soft, nontender, distended, positive bowel sounds  :   HD via AVF          Extremities:     warm well perfused equal strength throughout ; (+) B/LE LE edema, denies calf tenderness, +peripheral pulses  (+) sacral vac      ACTIVE MEDICATIONS:  acetaminophen     Tablet .. 650 milliGRAM(s) Oral every 6 hours PRN  apixaban 2.5 milliGRAM(s) Oral two times a day  ascorbic acid 500 milliGRAM(s) Oral daily  aspirin  chewable 81 milliGRAM(s) Oral daily  atorvastatin 80 milliGRAM(s) Oral at bedtime  benzocaine/menthol Lozenge 1 Lozenge Oral every 4 hours PRN  buDESOnide    Inhalation Suspension 0.5 milliGRAM(s) Inhalation two times a day  chlorhexidine 4% Liquid 1 Application(s) Topical <User Schedule>  dextrose 50% Injectable 50 milliLiter(s) IV Push every 15 minutes  dextrose 50% Injectable 25 Gram(s) IV Push once  epoetin ignacia (EPOGEN) Injectable 32470 Unit(s) IV Push <User Schedule>  ferrous    sulfate Liquid 300 milliGRAM(s) Enteral Tube daily  insulin lispro (ADMELOG) corrective regimen sliding scale   SubCutaneous three times a day before meals  insulin lispro (ADMELOG) corrective regimen sliding scale   SubCutaneous three times a day before meals  insulin lispro (ADMELOG) corrective regimen sliding scale   SubCutaneous at bedtime  lidocaine/prilocaine Cream 1 Application(s) Topical daily PRN  melatonin 5 milliGRAM(s) Oral at bedtime  methocarbamol 500 milliGRAM(s) Oral every 8 hours  metoprolol tartrate 25 milliGRAM(s) Oral two times a day  mirtazapine 7.5 milliGRAM(s) Oral at bedtime  oxyCODONE    IR 10 milliGRAM(s) Oral every 6 hours PRN  oxyCODONE    IR 5 milliGRAM(s) Oral every 4 hours PRN  simethicone 80 milliGRAM(s) Chew three times a day PRN  sodium chloride 0.65% Nasal 1 Spray(s) Both Nostrils every 12 hours  sodium chloride 0.9% for Nebulization 3 milliLiter(s) Nebulizer every 6 hours  sodium chloride 0.9% lock flush 10 milliLiter(s) IV Push every 1 hour PRN  sodium zirconium cyclosilicate 10 Gram(s) Oral <User Schedule>  traZODone 100 milliGRAM(s) Oral at bedtime    Case discussed in detail with Cardiothoracic Team and on-call Attending Dr. Rivers. Plan below as per discussion.

## 2025-03-16 NOTE — PROGRESS NOTE ADULT - PROBLEM SELECTOR PLAN 1
Continue with ASA 81 mg PO Daily.   Continue with Lopressor 25 mg PO BID   Continue with Lipitor 80 mg PO HS  Maintain + PW isolated  Decannulated 3/11  Increase activity as tolerated.   Encourage Chest PT / Pulmonary toileting and Incentive spirometry every 1 hour x 10 while awake.   Continue with Protonix 40 mg IV daily for PUD prophylaxis.   Sternal precautions and wound care  Daily Shower   D/C plan home with PT/OT once medically cleared   Plan of care discussed with attending Continue with ASA 81 mg PO Daily.   Continue with Lopressor 25 mg PO BID   Continue with Lipitor 80 mg PO HS  Maintain + PW isolated  Decannulated 3/11  Increase activity as tolerated.   Encourage Chest PT / Pulmonary toileting and Incentive spirometry every 1 hour x 10 while awake.   Continue with Protonix 40 mg IV daily for PUD prophylaxis.   Sternal precautions and wound care  Daily Shower   C/W Eliquis 2.5mg BID per Dr. Rivers  D/C plan home with PT/OT once medically cleared   Plan of care discussed with attending

## 2025-03-16 NOTE — PROGRESS NOTE ADULT - SUBJECTIVE AND OBJECTIVE BOX
MR#[id number]  PATIENT NAME:-ALAINA CANO    DATE OF SERVICE: 03-16-25 @ 07:35  Patient was seen and examined by Solo Quick MD on    03-16-25 @ 07:35 .  Interim events noted.Consultant notes ,Labs,Telemetry reviewed by me       HOSPITAL COURSE:   12/23 Cardiac cath  pLAD 70% ISR, mLCx 70%, D1 severe dz.   12/31-POD#1-C3L free LIMA-LAD; SVG-Diag; SVG-leftPL Awake OOB extubated Sinus rhythm on Dobutamine gtt  03/04-Awake Had FEES still NPO Atrial Flutter   03/06-Tx to SDU  03/07-Awake Atrial Flutter   03/08-Awake VSS Atrial Flutter still on NGT  03/09-Awake Atrial Flutter on TC 30% still has poor cough  03/10-Awake lying in bed Atrial Flutter on TC 30% for US Abdomen today  03/12-Still NPO Atrial Flutter no dyspnea seen by ENT  03/14-Trach removed  03/15-Awake Had FEES-passed for regular diet-AFl   03/16-Atrial Flutter Tolerating Diet c/o buttock pain          AMBULATION: Assisted[ ] Cane/walker[x ] Independent[ ]            MEDICATIONS  (STANDING):  apixaban 2.5 milliGRAM(s) Oral two times a day  ascorbic acid 500 milliGRAM(s) Oral daily  aspirin  chewable 81 milliGRAM(s) Oral daily  atorvastatin 80 milliGRAM(s) Oral at bedtime  buDESOnide    Inhalation Suspension 0.5 milliGRAM(s) Inhalation two times a day  chlorhexidine 4% Liquid 1 Application(s) Topical <User Schedule>  dextrose 50% Injectable 50 milliLiter(s) IV Push every 15 minutes  dextrose 50% Injectable 25 Gram(s) IV Push once  epoetin ignacia (EPOGEN) Injectable 56486 Unit(s) IV Push <User Schedule>  ferrous    sulfate Liquid 300 milliGRAM(s) Enteral Tube daily  insulin lispro (ADMELOG) corrective regimen sliding scale   SubCutaneous three times a day before meals  insulin lispro (ADMELOG) corrective regimen sliding scale   SubCutaneous three times a day before meals  insulin lispro (ADMELOG) corrective regimen sliding scale   SubCutaneous at bedtime  melatonin 5 milliGRAM(s) Oral at bedtime  methocarbamol 500 milliGRAM(s) Oral every 8 hours  metoprolol tartrate 25 milliGRAM(s) Oral two times a day  mirtazapine 7.5 milliGRAM(s) Oral at bedtime  sodium chloride 0.65% Nasal 1 Spray(s) Both Nostrils every 12 hours  sodium chloride 0.9% for Nebulization 3 milliLiter(s) Nebulizer every 6 hours  sodium zirconium cyclosilicate 10 Gram(s) Oral <User Schedule>  traZODone 100 milliGRAM(s) Oral at bedtime    MEDICATIONS  (PRN):  acetaminophen     Tablet .. 650 milliGRAM(s) Oral every 6 hours PRN Mild Pain (1 - 3)  benzocaine/menthol Lozenge 1 Lozenge Oral every 4 hours PRN Sore Throat  lidocaine/prilocaine Cream 1 Application(s) Topical daily PRN pre-HD  oxyCODONE    IR 10 milliGRAM(s) Oral every 6 hours PRN Severe Pain (7 - 10)  oxyCODONE    IR 5 milliGRAM(s) Oral every 4 hours PRN Moderate Pain (4 - 6)  simethicone 80 milliGRAM(s) Chew three times a day PRN Gas  sodium chloride 0.9% lock flush 10 milliLiter(s) IV Push every 1 hour PRN Pre/post blood products, medications, blood draw, and to maintain line patency            REVIEW OF SYSTEMS:  Constitutional: [ ] fever, [ ]weight loss,  [ ]fatigue [ ]weight gain  Eyes: [ ] visual changes  Respiratory: [ ]shortness of breath;  [ ] cough, [ ]wheezing, [ ]chills, [ ]hemoptysis  Cardiovascular: [ ] chest pain, [ ]palpitations, [ ]dizziness,  [ ]leg swelling[ ]orthopnea[ ]PND  Gastrointestinal: [ ] abdominal pain, [ ]nausea, [ ]vomiting,  [ ]diarrhea [ ]Constipation [ ]Melena  Genitourinary: [ ] dysuria, [ ] hematuria [ ]Vigil  Neurologic: [ ] headaches [ ] tremors[ ]weakness [ ]Paralysis Right[ ] Left[ ]  Skin: [ ] itching, [ ]burning, [ ] rashes  Endocrine: [ ] heat or cold intolerance  Musculoskeletal: [ ] joint pain or swelling; [ ] muscle, back, or extremity pain  Psychiatric: [ ] depression, [ ]anxiety, [ ]mood swings, or [ ]difficulty sleeping  Hematologic: [ ] easy bruising, [ ] bleeding gums    [ ] All remaining systems negative except as per above.   [ ]Unable to obtain.  [x] No change in ROS since admission      Vital Signs Last 24 Hrs  T(C): 37.1 (16 Mar 2025 06:00), Max: 37.1 (16 Mar 2025 06:00)  T(F): 98.7 (16 Mar 2025 06:00), Max: 98.7 (16 Mar 2025 06:00)  HR: 68 (16 Mar 2025 06:00) (66 - 113)  BP: 135/60 (16 Mar 2025 06:00) (126/91 - 154/64)  BP(mean): 86 (16 Mar 2025 06:00) (86 - 99)  RR: 16 (16 Mar 2025 06:00) (16 - 18)  SpO2: 100% (16 Mar 2025 06:00) (94% - 100%)    Parameters below as of 16 Mar 2025 06:00  Patient On (Oxygen Delivery Method): nasal cannula  O2 Flow (L/min): 2    I&O's Summary    15 Mar 2025 07:01  -  16 Mar 2025 07:00  --------------------------------------------------------  IN: 900 mL / OUT: 1 mL / NET: 899 mL        PHYSICAL EXAM:  General: No acute distress BMI-26  HEENT: EOMI, PERRL  Neck: Supple, [ ] JVD  Lungs: Equal air entry bilaterally; [ ] rales [ ] wheezing [ ] rhonchi  Heart: Irregular rate and rhythm; [x ] murmur   2/6 [ x] systolic [ ] diastolic [ ] radiation[ ] rubs [ ]  gallops  Abdomen: Nontender, bowel sounds present  Extremities: No clubbing, cyanosis, [ ] edema [ ]Pulses  equal and intact  Nervous system:  Alert & Oriented X3, no focal deficits  Psychiatric: Normal affect  Skin: No rashes or lesions    LABS:  03-14    137  |  97  |  38[H]  ----------------------------<  163[H]  4.3   |  24  |  5.45[H]    Ca    8.7      14 Mar 2025 15:18    TPro  6.8  /  Alb  2.9[L]  /  TBili  0.4  /  DBili  x   /  AST  16  /  ALT  7[L]  /  AlkPhos  106  03-14    Creatinine Trend: 5.45<--, 5.55<--, 6.17<--, 5.31<--, 4.92<--, 3.99<--                        10.2   14.82 )-----------( 233      ( 14 Mar 2025 15:18 )             31.6         Xray Chest 1 View- PORTABLE-Urgent (Xray Chest 1 View- PORTABLE-Urgent .) (03.13.25 @ 08:14) >  IMPRESSION:  Interval removal of tracheostomy tube.  No focal consolidation.

## 2025-03-16 NOTE — PROGRESS NOTE ADULT - ASSESSMENT
53 yo M with multiple medical problems including CAD s/p PCI in 2024 s/p CABG x 3 on 24    1/: Intubated for hypoxia & left lung white out s/p awake bronch with removal of copious mucopurulent secretions  : s/p IABP insertion, sepsis/septic shock due to E coli   ESBL pneumonia, collapsed Left Lung, s/p multiple bronch    tracheostomy tube placement    VRE E. Faecium Bcx   +HSV PCR BAL   tissue cx from back abscess - Serratia/MRSA  - - intermittent bradycardia/junctional episodes with hypotension  2/3: off , off theophylline. iHD 1L UF  : bronch for Left lung collapse wound vac, 2decho w/ moderate pericardial effusion - similar as before, US abd: small - moderate ascites - monitor at this time.  Wound vac placed for sacral wounds  : iHD-1L UF  : bronch today off positive pressure   : concern for left food drop   2/10 : no acute issues - CXR shows improving left sided aeration, pt subjectively reports having difficulty while lying flat  : s/p Paracentesis 3.5 L removed, leukocytosis   : Ascites fluid PMN < 250 (less likely SBP)   sniff test yesterday showed paradoxical diaphragmatic motion    : mucus plugging but able to clear airway with aggressive pulmonary toileting.   : no vent requirement overnight, able to mobilize secretion with pulm toileting  hyperkalemia post HD - workup for possible recirculation underway   : On TC X 48 hours - ambulating well, no mucus plugging events  : HFTC x24hrs (40L 30%)  : iHD 1.5L UF  : iHD 2L  : trach downsized to 7, bronch  - cultures growing ESBL E Coli, sacral wound vac changed   3/6 Transfer to SDU, +PW isolated, +sacral wound vac in place, + Tube feeds running, next HD scheduled for tomorrow   3/7 VSS; Continue with current medication regimen.  Continue on TC 40% 02.  Plan for HD today.  Nocturnal feeding.  3/8  FEES 3/3 notable for poor secretion management evidenced unable to clear 2/2 weak cough B/l vocal cords appeared to be adducted at midline. ENT eval demonstrates  limited vocal cord abduction vs. questionable vocal cord paresis--no plan for ENT intervention at this time, No PMV . Local sacral wound care by PT.   3/9 HD tomorrow, US abd in am Tolerating pm feeds  3/10 US abd and HD today. CXR PA/LAT. Consider ENT to reassess VC paresis feasibility of decannulation   Disposition: Home PT / OT     3/11  suction prn.  OOB to chair.  Eliquis for a flutter.  ENT note appreciated  Maintain npo, S&S f/u  3/12 The pt is for HD today, plan on trach decannulation after dialysis. Suction x 1 this am for minimal secretions,   3/13 VSS speech and swallow evaluation today  labs with HD shower today  3/14 VSS for FEES today= Passed regular diet   HD today  3/15 needs encouragement for OOB to chair --> educated ALL MEAL to be OOB in chair. encouraged increase ambulation as tolerated. MARIA ELENA shahid Chest PT prn.  53 yo M with multiple medical problems including CAD s/p PCI in 2024 s/p CABG x 3 on 24    1/: Intubated for hypoxia & left lung white out s/p awake bronch with removal of copious mucopurulent secretions  : s/p IABP insertion, sepsis/septic shock due to E coli   ESBL pneumonia, collapsed Left Lung, s/p multiple bronch    tracheostomy tube placement    VRE E. Faecium Bcx   +HSV PCR BAL   tissue cx from back abscess - Serratia/MRSA  - - intermittent bradycardia/junctional episodes with hypotension  2/3: off , off theophylline. iHD 1L UF  : bronch for Left lung collapse wound vac, 2decho w/ moderate pericardial effusion - similar as before, US abd: small - moderate ascites - monitor at this time.  Wound vac placed for sacral wounds  : iHD-1L UF  : bronch today off positive pressure   : concern for left food drop   2/10 : no acute issues - CXR shows improving left sided aeration, pt subjectively reports having difficulty while lying flat  : s/p Paracentesis 3.5 L removed, leukocytosis   : Ascites fluid PMN < 250 (less likely SBP)   sniff test yesterday showed paradoxical diaphragmatic motion    : mucus plugging but able to clear airway with aggressive pulmonary toileting.   : no vent requirement overnight, able to mobilize secretion with pulm toileting  hyperkalemia post HD - workup for possible recirculation underway   : On TC X 48 hours - ambulating well, no mucus plugging events  : HFTC x24hrs (40L 30%)  : iHD 1.5L UF  : iHD 2L  : trach downsized to 7, bronch  - cultures growing ESBL E Coli, sacral wound vac changed   3/6 Transfer to SDU, +PW isolated, +sacral wound vac in place, + Tube feeds running, next HD scheduled for tomorrow   3/7 VSS; Continue with current medication regimen.  Continue on TC 40% 02.  Plan for HD today.  Nocturnal feeding.  3/8  FEES 3/3 notable for poor secretion management evidenced unable to clear 2/2 weak cough B/l vocal cords appeared to be adducted at midline. ENT eval demonstrates  limited vocal cord abduction vs. questionable vocal cord paresis--no plan for ENT intervention at this time, No PMV . Local sacral wound care by PT.   3/9 HD tomorrow, US abd in am Tolerating pm feeds  3/10 US abd and HD today. CXR PA/LAT. Consider ENT to reassess VC paresis feasibility of decannulation   Disposition: Home PT / OT     3/11  suction prn.  OOB to chair.  Eliquis for a flutter.  ENT note appreciated  Maintain npo, S&S f/u  3/12 The pt is for HD today, plan on trach decannulation after dialysis. Suction x 1 this am for minimal secretions,   3/13 VSS speech and swallow evaluation today  labs with HD shower today  3/14 VSS for FEES today= Passed regular diet   HD today  3/15 needs encouragement for OOB to chair --> educated ALL MEAL to be OOB in chair. encouraged increase ambulation as tolerated. MARIA ELENA shahid Chest PT prn.   3/16 OOB to chair --> tolerating diet; encouraged to increase ambulation as tolerated 55 yo M with multiple medical problems including CAD s/p PCI in 2024 s/p CABG x 3 on 24    1/: Intubated for hypoxia & left lung white out s/p awake bronch with removal of copious mucopurulent secretions  : s/p IABP insertion, sepsis/septic shock due to E coli   ESBL pneumonia, collapsed Left Lung, s/p multiple bronch    tracheostomy tube placement    VRE E. Faecium Bcx   +HSV PCR BAL   tissue cx from back abscess - Serratia/MRSA  - - intermittent bradycardia/junctional episodes with hypotension  2/3: off , off theophylline. iHD 1L UF  : bronch for Left lung collapse wound vac, 2decho w/ moderate pericardial effusion - similar as before, US abd: small - moderate ascites - monitor at this time.  Wound vac placed for sacral wounds  : iHD-1L UF  : bronch today off positive pressure   : concern for left food drop   2/10 : no acute issues - CXR shows improving left sided aeration, pt subjectively reports having difficulty while lying flat  : s/p Paracentesis 3.5 L removed, leukocytosis   : Ascites fluid PMN < 250 (less likely SBP)   sniff test yesterday showed paradoxical diaphragmatic motion    : mucus plugging but able to clear airway with aggressive pulmonary toileting.   : no vent requirement overnight, able to mobilize secretion with pulm toileting  hyperkalemia post HD - workup for possible recirculation underway   : On TC X 48 hours - ambulating well, no mucus plugging events  : HFTC x24hrs (40L 30%)  : iHD 1.5L UF  : iHD 2L  : trach downsized to 7, bronch  - cultures growing ESBL E Coli, sacral wound vac changed   3/6 Transfer to SDU, +PW isolated, +sacral wound vac in place, + Tube feeds running, next HD scheduled for tomorrow   3/7 VSS; Continue with current medication regimen.  Continue on TC 40% 02.  Plan for HD today.  Nocturnal feeding.  3/8  FEES 3/3 notable for poor secretion management evidenced unable to clear 2/2 weak cough B/l vocal cords appeared to be adducted at midline. ENT eval demonstrates  limited vocal cord abduction vs. questionable vocal cord paresis--no plan for ENT intervention at this time, No PMV . Local sacral wound care by PT.   3/9 HD tomorrow, US abd in am Tolerating pm feeds  3/10 US abd and HD today. CXR PA/LAT. Consider ENT to reassess VC paresis feasibility of decannulation   Disposition: Home PT / OT     3/11  suction prn.  OOB to chair.  Eliquis for a flutter.  ENT note appreciated  Maintain npo, S&S f/u  3/12 The pt is for HD today, plan on trach decannulation after dialysis. Suction x 1 this am for minimal secretions,   3/13 VSS speech and swallow evaluation today  labs with HD shower today  3/14 VSS for FEES today= Passed regular diet   HD today  3/15 needs encouragement for OOB to chair --> educated ALL MEAL to be OOB in chair. encouraged increase ambulation as tolerated. MARIA ELENA shahid Chest PT prn.   3/16 OOB to chair --> tolerating diet; encouraged to increase ambulation as tolerated; pending rpt US Abdomen on Monday for eval of ascites. Labs on HD day, HD planned for Monday per Renal.

## 2025-03-17 LAB
ALBUMIN SERPL ELPH-MCNC: 2.8 G/DL — LOW (ref 3.3–5)
ALP SERPL-CCNC: 101 U/L — SIGNIFICANT CHANGE UP (ref 40–120)
ALT FLD-CCNC: 10 U/L — SIGNIFICANT CHANGE UP (ref 10–45)
ANION GAP SERPL CALC-SCNC: 20 MMOL/L — HIGH (ref 5–17)
APTT BLD: 37.5 SEC — HIGH (ref 24.5–35.6)
AST SERPL-CCNC: 15 U/L — SIGNIFICANT CHANGE UP (ref 10–40)
BILIRUB SERPL-MCNC: 0.4 MG/DL — SIGNIFICANT CHANGE UP (ref 0.2–1.2)
BUN SERPL-MCNC: 57 MG/DL — HIGH (ref 7–23)
CALCIUM SERPL-MCNC: 8.9 MG/DL — SIGNIFICANT CHANGE UP (ref 8.4–10.5)
CO2 SERPL-SCNC: 20 MMOL/L — LOW (ref 22–31)
CREAT SERPL-MCNC: 7.06 MG/DL — HIGH (ref 0.5–1.3)
EGFR: 9 ML/MIN/1.73M2 — LOW
EGFR: 9 ML/MIN/1.73M2 — LOW
GLUCOSE SERPL-MCNC: 87 MG/DL — SIGNIFICANT CHANGE UP (ref 70–99)
HCT VFR BLD CALC: 30.5 % — LOW (ref 39–50)
HGB BLD-MCNC: 9.2 G/DL — LOW (ref 13–17)
MCHC RBC-ENTMCNC: 30.2 G/DL — LOW (ref 32–36)
MCHC RBC-ENTMCNC: 30.8 PG — SIGNIFICANT CHANGE UP (ref 27–34)
MCV RBC AUTO: 102 FL — HIGH (ref 80–100)
PHOSPHATE SERPL-MCNC: 4.1 MG/DL — SIGNIFICANT CHANGE UP (ref 2.5–4.5)
PLATELET # BLD AUTO: 225 K/UL — SIGNIFICANT CHANGE UP (ref 150–400)
POTASSIUM SERPL-MCNC: 5.2 MMOL/L — SIGNIFICANT CHANGE UP (ref 3.5–5.3)
POTASSIUM SERPL-SCNC: 5.2 MMOL/L — SIGNIFICANT CHANGE UP (ref 3.5–5.3)
PROT SERPL-MCNC: 7.1 G/DL — SIGNIFICANT CHANGE UP (ref 6–8.3)
PROTHROM AB SERPL-ACNC: 16.6 SEC — HIGH (ref 9.9–13.4)
RBC # BLD: 2.99 M/UL — LOW (ref 4.2–5.8)
RBC # FLD: 18.7 % — HIGH (ref 10.3–14.5)
RH IG SCN BLD-IMP: POSITIVE — SIGNIFICANT CHANGE UP
SODIUM SERPL-SCNC: 140 MMOL/L — SIGNIFICANT CHANGE UP (ref 135–145)
WBC # BLD: 10.87 K/UL — HIGH (ref 3.8–10.5)
WBC # FLD AUTO: 10.87 K/UL — HIGH (ref 3.8–10.5)

## 2025-03-17 RX ORDER — METOPROLOL SUCCINATE 50 MG/1
2.5 TABLET, EXTENDED RELEASE ORAL ONCE
Refills: 0 | Status: COMPLETED | OUTPATIENT
Start: 2025-03-17 | End: 2025-03-17

## 2025-03-17 RX ORDER — INSULIN LISPRO 100 U/ML
INJECTION, SOLUTION INTRAVENOUS; SUBCUTANEOUS AT BEDTIME
Refills: 0 | Status: DISCONTINUED | OUTPATIENT
Start: 2025-03-17 | End: 2025-03-19

## 2025-03-17 RX ADMIN — Medication 300 MILLIGRAM(S): at 11:24

## 2025-03-17 RX ADMIN — METOPROLOL SUCCINATE 25 MILLIGRAM(S): 50 TABLET, EXTENDED RELEASE ORAL at 18:30

## 2025-03-17 RX ADMIN — INSULIN LISPRO 1: 100 INJECTION, SOLUTION INTRAVENOUS; SUBCUTANEOUS at 11:49

## 2025-03-17 RX ADMIN — Medication 100 MILLIGRAM(S): at 23:14

## 2025-03-17 RX ADMIN — Medication 81 MILLIGRAM(S): at 11:23

## 2025-03-17 RX ADMIN — Medication 5 MILLIGRAM(S): at 23:06

## 2025-03-17 RX ADMIN — OXYCODONE HYDROCHLORIDE 10 MILLIGRAM(S): 30 TABLET ORAL at 19:30

## 2025-03-17 RX ADMIN — Medication 3 MILLILITER(S): at 05:59

## 2025-03-17 RX ADMIN — OXYCODONE HYDROCHLORIDE 5 MILLIGRAM(S): 30 TABLET ORAL at 00:22

## 2025-03-17 RX ADMIN — Medication 1 SPRAY(S): at 06:01

## 2025-03-17 RX ADMIN — OXYCODONE HYDROCHLORIDE 5 MILLIGRAM(S): 30 TABLET ORAL at 00:52

## 2025-03-17 RX ADMIN — METHOCARBAMOL 500 MILLIGRAM(S): 500 TABLET, FILM COATED ORAL at 05:58

## 2025-03-17 RX ADMIN — MIRTAZAPINE 7.5 MILLIGRAM(S): 30 TABLET, FILM COATED ORAL at 23:07

## 2025-03-17 RX ADMIN — OXYCODONE HYDROCHLORIDE 10 MILLIGRAM(S): 30 TABLET ORAL at 11:00

## 2025-03-17 RX ADMIN — METOPROLOL SUCCINATE 2.5 MILLIGRAM(S): 50 TABLET, EXTENDED RELEASE ORAL at 11:34

## 2025-03-17 RX ADMIN — EPOETIN ALFA 10000 UNIT(S): 10000 SOLUTION INTRAVENOUS; SUBCUTANEOUS at 17:24

## 2025-03-17 RX ADMIN — METOPROLOL SUCCINATE 25 MILLIGRAM(S): 50 TABLET, EXTENDED RELEASE ORAL at 05:59

## 2025-03-17 RX ADMIN — ATORVASTATIN CALCIUM 80 MILLIGRAM(S): 80 TABLET, FILM COATED ORAL at 23:07

## 2025-03-17 RX ADMIN — Medication 500 MILLIGRAM(S): at 11:22

## 2025-03-17 RX ADMIN — BUDESONIDE 0.5 MILLIGRAM(S): 0.25 SUSPENSION RESPIRATORY (INHALATION) at 05:59

## 2025-03-17 RX ADMIN — APIXABAN 2.5 MILLIGRAM(S): 2.5 TABLET, FILM COATED ORAL at 05:59

## 2025-03-17 RX ADMIN — Medication 1 APPLICATION(S): at 08:14

## 2025-03-17 RX ADMIN — OXYCODONE HYDROCHLORIDE 10 MILLIGRAM(S): 30 TABLET ORAL at 18:30

## 2025-03-17 RX ADMIN — Medication 5 MILLIGRAM(S): at 00:22

## 2025-03-17 RX ADMIN — Medication 100 MILLIGRAM(S): at 00:22

## 2025-03-17 RX ADMIN — OXYCODONE HYDROCHLORIDE 10 MILLIGRAM(S): 30 TABLET ORAL at 10:26

## 2025-03-17 NOTE — PROGRESS NOTE ADULT - SUBJECTIVE AND OBJECTIVE BOX
MR#[id number]  PATIENT NAME:RAAD CANO    DATE OF SERVICE: 03-17-25 @ 07:39  Patient was seen and examined by Solo Quick MD on    03-17-25 @ 07:39 .  Interim events noted.Consultant notes ,Labs,Telemetry reviewed by me       HOSPITAL COURSE: HPI:  54M with PMH of HTN, HLD, ESRD on HD MWF via LUE AVF, ascites (requiring repeat paracenteses q4-6wks), NICM with moderate LV dysfunction w/ EF 35-40%(2023) and CAD s/p atherectomy + SALLIE to D1(4/11/2024) presents to [facility] transferred from [facility].  Cardiac cath which showed pLAD 70% ISR, mLCx 70%, D1 severe dz.   Patient now transferred to [facility] CTS Dr. Rivers for CABG eval.     (24 Dec 2024 05:07)      INTERIM EVENTS:Patient seen at bedside ,interim events noted.  12/23 Cardiac cath  pLAD 70% ISR, mLCx 70%, D1 severe dz.   12/31-POD#1-C3L free LIMA-LAD; SVG-Diag; SVG-leftPL Awake OOB extubated Sinus rhythm on Dobutamine gtt  03/04-Awake Had FEES still NPO Atrial Flutter   03/06-Tx to SDU  03/07-Awake Atrial Flutter   03/08-Awake VSS Atrial Flutter still on NGT  03/09-Awake Atrial Flutter on TC 30% still has poor cough  03/10-Awake lying in bed Atrial Flutter on TC 30% for US Abdomen today  03/12-Still NPO Atrial Flutter no dyspnea seen by ENT  03/14-Trach removed  03/15-Awake Had FEES-passed for regular diet-AFl   03/16-OOB feels well no dyspnea-remains in Atrial Flutter         AMBULATION: Assisted[ ] Cane/walker[x ] Independent[ ]        MEDICATIONS  (STANDING):  apixaban 2.5 milliGRAM(s) Oral two times a day  ascorbic acid 500 milliGRAM(s) Oral daily  aspirin  chewable 81 milliGRAM(s) Oral daily  atorvastatin 80 milliGRAM(s) Oral at bedtime  buDESOnide    Inhalation Suspension 0.5 milliGRAM(s) Inhalation two times a day  chlorhexidine 4% Liquid 1 Application(s) Topical <User Schedule>  epoetin ignacia (EPOGEN) Injectable 99576 Unit(s) IV Push <User Schedule>  ferrous    sulfate Liquid 300 milliGRAM(s) Enteral Tube daily  insulin lispro (ADMELOG) corrective regimen sliding scale   SubCutaneous three times a day before meals  insulin lispro (ADMELOG) corrective regimen sliding scale   SubCutaneous three times a day before meals  insulin lispro (ADMELOG) corrective regimen sliding scale   SubCutaneous at bedtime  melatonin 5 milliGRAM(s) Oral at bedtime  methocarbamol 500 milliGRAM(s) Oral every 8 hours  metoprolol tartrate 25 milliGRAM(s) Oral two times a day  mirtazapine 7.5 milliGRAM(s) Oral at bedtime  sodium chloride 0.65% Nasal 1 Spray(s) Both Nostrils every 12 hours  sodium chloride 0.9% for Nebulization 3 milliLiter(s) Nebulizer every 6 hours  sodium zirconium cyclosilicate 10 Gram(s) Oral <User Schedule>  traZODone 100 milliGRAM(s) Oral at bedtime    MEDICATIONS  (PRN):  acetaminophen     Tablet .. 650 milliGRAM(s) Oral every 6 hours PRN Mild Pain (1 - 3)  benzocaine/menthol Lozenge 1 Lozenge Oral every 4 hours PRN Sore Throat  lidocaine/prilocaine Cream 1 Application(s) Topical daily PRN pre-HD  oxyCODONE    IR 10 milliGRAM(s) Oral every 6 hours PRN Severe Pain (7 - 10)  oxyCODONE    IR 5 milliGRAM(s) Oral every 4 hours PRN Moderate Pain (4 - 6)  simethicone 80 milliGRAM(s) Chew three times a day PRN Gas  sodium chloride 0.9% lock flush 10 milliLiter(s) IV Push every 1 hour PRN Pre/post blood products, medications, blood draw, and to maintain line patency            REVIEW OF SYSTEMS:  Constitutional: [ ] fever, [ ]weight loss,  [x ]fatigue [ ]weight gain  Eyes: [ ] visual changes  Respiratory: [ ]shortness of breath;  [ ] cough, [ ]wheezing, [ ]chills, [ ]hemoptysis  Cardiovascular: [ ] chest pain, [ ]palpitations, [ ]dizziness,  [ ]leg swelling[ ]orthopnea[ ]PND  Gastrointestinal: [ ] abdominal pain, [ ]nausea, [ ]vomiting,  [ ]diarrhea [ ]Constipation [ ]Melena  Genitourinary: [ ] dysuria, [ ] hematuria [ ]Vigil  Neurologic: [ ] headaches [ ] tremors[ ]weakness [ ]Paralysis Right[ ] Left[ ]  Skin: [ ] itching, [ ]burning, [ ] rashes  Endocrine: [ ] heat or cold intolerance  Musculoskeletal: [ ] joint pain or swelling; [ ] muscle, back, or extremity pain  Psychiatric: [ ] depression, [ ]anxiety, [ ]mood swings, or [ ]difficulty sleeping  Hematologic: [ ] easy bruising, [ ] bleeding gums    [ ] All remaining systems negative except as per above.   [ ]Unable to obtain.  [x] No change in ROS since admission      Vital Signs Last 24 Hrs  T(C): 36.5 (16 Mar 2025 23:22), Max: 36.8 (16 Mar 2025 08:17)  T(F): 97.7 (16 Mar 2025 23:22), Max: 98.3 (16 Mar 2025 08:17)  HR: 66 (16 Mar 2025 23:22) (66 - 89)  BP: 97/55 (16 Mar 2025 23:22) (97/55 - 140/60)  BP(mean): 63 (16 Mar 2025 23:22) (63 - 86)  RR: 18 (16 Mar 2025 23:22) (18 - 19)  SpO2: 97% (16 Mar 2025 23:22) (97% - 100%)    Parameters below as of 16 Mar 2025 23:22  Patient On (Oxygen Delivery Method): room air      I&O's Summary    16 Mar 2025 07:01  -  17 Mar 2025 07:00  --------------------------------------------------------  IN: 1060 mL / OUT: 0 mL / NET: 1060 mL        PHYSICAL EXAM:  General: No acute distress BMI-28  HEENT: EOMI, PERRL  Neck: Supple, [ ] JVD  Lungs: Equal air entry bilaterally; [ ] rales [ ] wheezing [ ] rhonchi  Heart: Regular rate and rhythm; [x ] murmur   2/6 [ x] systolic [ ] diastolic [ ] radiation[ ] rubs [ ]  gallops  Abdomen: Nontender, bowel sounds present  Extremities: No clubbing, cyanosis, [ ] edema [ ]Pulses  equal and intact  Nervous system:  Alert & Oriented X3, no focal deficits  Psychiatric: Normal affect  Skin: No rashes or lesions    LABS:        Creatinine Trend: 5.45<--, 5.55<--, 6.17<--, 5.31<--, 4.92<--, 3.99<--

## 2025-03-17 NOTE — PROGRESS NOTE ADULT - SUBJECTIVE AND OBJECTIVE BOX
[facility]    Dr. Nica Styles     Office [phone] (9 am to 5 pm)  Service: [phone] (5pm to 9am)  Also Available on TEAMS      RENAL PROGRESS NOTE: DATE OF SERVICE 03-17-25 @ 10:44    Patient is a 54y old  Male who presents with a chief complaint of CAD S/P CABG (17 Mar 2025 07:38)      Patient seen and examined at bedside. No chest pain/sob    VITALS:  T(F): 98.1 (03-17-25 @ 08:36), Max: 98.1 (03-17-25 @ 08:36)  HR: 86 (03-17-25 @ 08:36)  BP: 130/74 (03-17-25 @ 08:36)  RR: 18 (03-17-25 @ 08:36)  SpO2: 95% (03-17-25 @ 08:36)  Wt(kg): --    03-16 @ 07:01  -  03-17 @ 07:00  --------------------------------------------------------  IN: 1060 mL / OUT: 0 mL / NET: 1060 mL          PHYSICAL EXAM:  Constitutional: NAD  Neck: No JVD  Respiratory: CTAB, no wheezes, rales or rhonchi  Cardiovascular: S1, S2, RRR  Gastrointestinal: BS+, soft, NT/ND  Extremities: No peripheral edema    Hospital Medications:   MEDICATIONS  (STANDING):  ascorbic acid 500 milliGRAM(s) Oral daily  aspirin  chewable 81 milliGRAM(s) Oral daily  atorvastatin 80 milliGRAM(s) Oral at bedtime  buDESOnide    Inhalation Suspension 0.5 milliGRAM(s) Inhalation two times a day  chlorhexidine 4% Liquid 1 Application(s) Topical <User Schedule>  dextrose 50% Injectable 50 milliLiter(s) IV Push every 15 minutes  dextrose 50% Injectable 25 Gram(s) IV Push once  epoetin ignacia (EPOGEN) Injectable 14181 Unit(s) IV Push <User Schedule>  ferrous    sulfate Liquid 300 milliGRAM(s) Enteral Tube daily  insulin lispro (ADMELOG) corrective regimen sliding scale   SubCutaneous three times a day before meals  insulin lispro (ADMELOG) corrective regimen sliding scale   SubCutaneous three times a day before meals  melatonin 5 milliGRAM(s) Oral at bedtime  methocarbamol 500 milliGRAM(s) Oral every 8 hours  metoprolol tartrate 25 milliGRAM(s) Oral two times a day  mirtazapine 7.5 milliGRAM(s) Oral at bedtime  sodium chloride 0.65% Nasal 1 Spray(s) Both Nostrils every 12 hours  sodium chloride 0.9% for Nebulization 3 milliLiter(s) Nebulizer every 6 hours  sodium zirconium cyclosilicate 10 Gram(s) Oral <User Schedule>  traZODone 100 milliGRAM(s) Oral at bedtime      LABS:        Creatinine Trend: 5.45 <--, 5.55 <--, 6.17 <--            Urine Studies:  Urinalysis - [03-14-25 @ 15:18]      Color  / Appearance  / SG  / pH       Gluc 163 / Ketone   / Bili  / Urobili        Blood  / Protein  / Leuk Est  / Nitrite       RBC  / WBC  / Hyaline  / Gran  / Sq Epi  / Non Sq Epi  / Bacteria       Iron 29, TIBC 143, %sat 20      [12-19-24 @ 05:00]  Ferritin 904      [12-19-24 @ 05:00]  TSH 4.75      [12-24-24 @ 06:50]    HBsAg Nonreact      [03-14-25 @ 15:50]  HBcAb Nonreact      [03-14-25 @ 15:50]  HCV 0.46, Nonreact      [03-14-25 @ 15:50]      RADIOLOGY & ADDITIONAL STUDIES:

## 2025-03-17 NOTE — PROGRESS NOTE ADULT - SUBJECTIVE AND OBJECTIVE BOX
VITAL SIGNS    Telemetry:  aflutter 60    Vital Signs Last 24 Hrs  T(C): 36.5 (25 @ 23:22), Max: 36.8 (25 @ 08:17)  T(F): 97.7 (25 @ 23:22), Max: 98.3 (25 @ 08:17)  HR: 66 (25 @ 23:22) (66 - 89)  BP: 97/55 (25 @ 23:22) (97/55 - 140/60)  RR: 18 (25 @ 23:22) (18 - 19)  SpO2: 97% (25 @ 23:22) (97% - 100%)                    @ 07:01  -   @ 07:00  --------------------------------------------------------  IN: 1060 mL / OUT: 0 mL / NET: 1060 mL          Daily     Daily Weight in k.2 (16 Mar 2025 08:17)            CAPILLARY BLOOD GLUCOSE      POCT Blood Glucose.: 137 mg/dL (16 Mar 2025 21:35)  POCT Blood Glucose.: 171 mg/dL (16 Mar 2025 16:36)  POCT Blood Glucose.: 169 mg/dL (16 Mar 2025 11:38)  POCT Blood Glucose.: 170 mg/dL (16 Mar 2025 07:35)            Drains:     MS         [  ] Drainage:                 L Pleural  [  ]  Drainage:                R Pleural  [  ]  Drainage:    Pacing Wires        [  ]   Settings:                                  Isolated  [  ]    Coumadin    [ ] YES          [  x]      NO         eliquis                          PHYSICAL EXAM      Neurology: alert and oriented x 3, nonfocal, no gross deficits  CV : s1 s2 RRR  Trach site cdi  Sternal Wound :  CDI , Stable  Lungs: cta  Abdomen: soft, nontender, nondistended, positive bowel sounds, last bowel movement +                       :   esrd   Extremities:     trace edema   /  -   calve tenderness ,   , l leg inc cdi, pigmentation changes LE            acetaminophen     Tablet .. 650 milliGRAM(s) Oral every 6 hours PRN  apixaban 2.5 milliGRAM(s) Oral two times a day  ascorbic acid 500 milliGRAM(s) Oral daily  aspirin  chewable 81 milliGRAM(s) Oral daily  atorvastatin 80 milliGRAM(s) Oral at bedtime  benzocaine/menthol Lozenge 1 Lozenge Oral every 4 hours PRN  buDESOnide    Inhalation Suspension 0.5 milliGRAM(s) Inhalation two times a day  chlorhexidine 4% Liquid 1 Application(s) Topical <User Schedule>  dextrose 50% Injectable 50 milliLiter(s) IV Push every 15 minutes  dextrose 50% Injectable 25 Gram(s) IV Push once  epoetin ignacia (EPOGEN) Injectable 75386 Unit(s) IV Push <User Schedule>  ferrous    sulfate Liquid 300 milliGRAM(s) Enteral Tube daily  insulin lispro (ADMELOG) corrective regimen sliding scale   SubCutaneous three times a day before meals  insulin lispro (ADMELOG) corrective regimen sliding scale   SubCutaneous three times a day before meals  insulin lispro (ADMELOG) corrective regimen sliding scale   SubCutaneous at bedtime  lidocaine/prilocaine Cream 1 Application(s) Topical daily PRN  melatonin 5 milliGRAM(s) Oral at bedtime  methocarbamol 500 milliGRAM(s) Oral every 8 hours  metoprolol tartrate 25 milliGRAM(s) Oral two times a day  mirtazapine 7.5 milliGRAM(s) Oral at bedtime  oxyCODONE    IR 10 milliGRAM(s) Oral every 6 hours PRN  oxyCODONE    IR 5 milliGRAM(s) Oral every 4 hours PRN  simethicone 80 milliGRAM(s) Chew three times a day PRN  sodium chloride 0.65% Nasal 1 Spray(s) Both Nostrils every 12 hours  sodium chloride 0.9% for Nebulization 3 milliLiter(s) Nebulizer every 6 hours  sodium chloride 0.9% lock flush 10 milliLiter(s) IV Push every 1 hour PRN  sodium zirconium cyclosilicate 10 Gram(s) Oral <User Schedule>  traZODone 100 milliGRAM(s) Oral at bedtime                    Physical Therapy Rec:   Home  [  ]   Home w/ PT  [  ]  Rehab  [  ]  Discussed with Cardiothoracic Team at AM rounds.

## 2025-03-17 NOTE — PROGRESS NOTE ADULT - PROBLEM SELECTOR PLAN 1
Continue with ASA 81 mg PO Daily.   Continue with Lopressor 25 mg PO BID   Continue with Lipitor 80 mg PO HS  Maintain + PW isolated  Decannulated 3/11  Increase activity as tolerated.   Encourage Chest PT / Pulmonary toileting and Incentive spirometry every 1 hour x 10 while awake.   Continue with Protonix 40 mg IV daily for PUD prophylaxis.   Sternal precautions and wound care  Daily Shower   C/W Eliquis 2.5mg BID per Dr. Rivers  D/C plan home with PT/OT once medically cleared   Plan of care discussed with attending

## 2025-03-17 NOTE — PROGRESS NOTE ADULT - ASSESSMENT
54M with PMH of HTN, HLD, ESRD on HD MWF via LUE AVF, ascites (requiring repeat paracenteses q4-6wks), NICM with moderate LV dysfunction w/ EF 35-40%() and CAD s/p atherectomy + SALLIE to D1(2024) presents to [facility] transferred from [facility].  Cardiac cath which showed pLAD 70% ISR, mLCx 70%, D1 severe dz.   Patient now transferred to [facility] CTS Dr. Rivers for CABG eval      # CAD s/p NSTEMI  Hx CAD s/p PCI LAD   Presented with ADHF elevated troponins  Positive NST1-Cath- pLAD 70% ISR, mLCx 70%, D1 severe dz.   TTE EF 35% Severe TRWas on Plavix-p2y12- 321 been off Plavix since -POD#1-C3L free LIMA-LAD; SVG-Diag; SCU-rktyUB-Hmcttelyn on  Sinus rhythm,Amio for AF prophylaxis   Atrial Flutter on TC during day Ambulating  -Increased congestion on CXR had HD yesterday remains in AFl   -Atrial Flutter HFTC 40L/30% BP stable     -Atrial Flutter tolerating TC for HD MWF-consider DOAC   OOB Abd US moderate 4 quad ascites noted remains in Atrial Flutter~~70's   TC Atrial Flutter  -s/p Bronch/BAL remains in AFl  Trach downsized 7   -Tolerating TC AFl rate controlled On Heparin gtt transition to DOAC  -Tx to SDU on Eliquis Atrial Flutter  -VSS Atrial Flutter rate controlled on Eliquis for AC  -Atrial Flutter ENT follow up Taper O2 requirement on TC 30%  -Awake no dyspnea on TC  -Trach tube removed still unable PO  03/15-Passed FEES -Regular diet    -Tolerating diet no dyspnes      # Acute on Chronic Systolic Heart Failure now improved  EF-35% ,severe TR  Had HD post op  GDMT post op  LVEF improved post bypass   -TTE EF 40%,trace effusion  ECG c/w pericarditis-was on colchicine   01/15-TTE EF 55%  -TTE EF 60%   -Normal LV systolic function Moderate pericardial effusion  -On TC-HF and Vent support at nights   02/10-Vent HS with T Collar trial Ambulated Remains in AF  Repeat TTE Normal LV Systolic function Small Pericardial effusion decreased from 2/3        Vascular evaluated  AVGraft /?steal causing RV failure-'' Very low suspicion of steal Sd and AVF causing current clinical state. ''   VA Duplex Hemodialysis Access, Left (25 @ 13:35) >   Arterial flow volume of 1301 mL/m, and the venous flow volume of  1492 mL/m are consistent with a normally functioning dialysis access  fistula.      # Ascites  Hx chronic ascites  Has drainage q4-6 weeks  Last drained -4600mL  ? ascites related to severe TR  Had gytvfonlwova-Cpbasju-wx growth   CT Abdomen 01/10-Large volume ascites-  US abdomen--Small to moderate volume abdominal/pelvic ascites  US abdomen  Moderate ascites  US Abdomen -Moderate 4 quad ascites  US Abdomen Limited 03/10-Moderate ascites throughout abdomen, with large pelvic ascites.      # ESRD  Now on  HD-MWF

## 2025-03-17 NOTE — PROVIDER CONTACT NOTE (OTHER) - BACKGROUND
12/30 S/P CABG X3 . 1/2 Intubated for Hypoxia. 1/4  S/P IABP  insertion- septic shock due to E Coli, Collapsed Left Lung. PMH: HTN, HLD, ESRD  on Hemodialysis 3X week.

## 2025-03-17 NOTE — PROVIDER CONTACT NOTE (OTHER) - DATE AND TIME:
06-Mar-2025 00:00
01-Mar-2025 11:00
15-Feb-2025 18:00
29-Dec-2024 04:49
06-Feb-2025 05:55
10-Mar-2025 17:30
11-Jan-2025 18:00
17-Mar-2025 16:00
17-Mar-2025 19:15
25-Dec-2024 17:30
26-Jan-2025 22:07
02-Jan-2025 17:00
15-Feb-2025 01:00
15-Feb-2025 20:00
17-Mar-2025 18:00
27-Jan-2025 22:57
14-Mar-2025 09:00

## 2025-03-17 NOTE — PROVIDER CONTACT NOTE (OTHER) - SITUATION
Patient Peripheral IV Midline access accidentally came out after patient shower in the bathroom. Patient refused reinsertion of Peripheral IV access.

## 2025-03-17 NOTE — PROVIDER CONTACT NOTE (OTHER) - BACKGROUND
12/30 S/P CABG X3 . 1/2 Intubated for Hypoxia, 1/4 S/P IABP insertion. PMH : HTN, HLD, Vhronic Kidney Disease, End Stage Renal Disease. Hemodialysis 3X/week. CAD

## 2025-03-17 NOTE — PROVIDER CONTACT NOTE (OTHER) - SITUATION
Came in to my shift and got in report that pt midline had to be d/c and pt refused to let staff put in another IVL Received in report that pt midline had to be d/c and pt refused to let staff put in another IVL

## 2025-03-17 NOTE — PROVIDER CONTACT NOTE (OTHER) - BACKGROUND
pt s/p CABG 12/30, trach and NGT d/c 3/12 and 3/14, passed FEES test. pt s/p CABG X 3 12/30, 1/2 intubated for hypoxia, 1/4 s/p IABP insertion, trach and NGT d/c 3/12 and 3/14, passed FEES test.

## 2025-03-17 NOTE — PROVIDER CONTACT NOTE (OTHER) - RECOMMENDATIONS
come to bedside and assess ot
Bloods drawn for BMP and CBC via hemodialysis and sent to Lab
Consider XRay to confirm placement
Pt and wife reeducated on patient's poor respiratory status and failed sp&sw eval and the need to not take anything by mouth. Pt and wife reeducated on aspiration pneumonia and lethal consequences by
Reinsert Peripheral IV via Ultrasound
as per MYRA Barakat hold tube feed, hold oral medication, give IVP zofran.
come to bedside and assess pt
seal wound vac
Provider to be made aware
Review meds and discuss with provider for order change or okay to give verbally.
sputum cx and bronchoscopy, provider to bedside

## 2025-03-17 NOTE — PROGRESS NOTE ADULT - ASSESSMENT
54M with PMH of HTN, HLD, ESRD on HD MWF via LUE AVF, ascites (requiring repeat paracenteses q4-6wks), NICM with moderate LV dysfunction w/ EF 35-40%(2023) and CAD s/p atherectomy + SALLIE to D1(4/11/2024) presents to [facility] transferred from [facility] for CABG eval  Nephro on board for HD needs.    ESRD on HD   Nephrologist Dr. Bailey  [facility] Schedule / Access:  LUE AVF  Center: [facility]  Pt s/p HD on 2/19 and 2/21.  s/p HD 2/24 uf 1.5L  S/p HD on 02/26 2 L UF and 02/28 2 L UF again   S/p HD on 03/03 and 03/5, 03/07  S/p HD on 03/10 AND 03/12  S/p HD on 03/14 1.5 L removed  HD today per schedule    Keep MAP>65  Renal Diet  HD consent in chart   Pending labs since 03/10    Hyper/Hypokalemia  Pt intermittently Hyperkalemia  HD as scheduled  now improved  Low K diet.  C/W Lokelma 10gm on nonHD days.  Monitor K.    HTN  BP fluctuating; controlled.  UF w/ HD as BP permits.  Low sodium diet.  Management per CTICU  Monitor BP     CKD MBD  Check PTH   Low PO4 diet.  Not on binder.  Monitor Ca , PO4 daily     Anemia  CRISTIANE with HD  Repeat iron studies.  Transfuse if hgb <7    CAD with multivessel disease  CTICU following  s/p CABG 12/30    Hypoxic respiratory failure   S/p tracheostomy 01/18 and decannulated on 03/11  Per surgery  Pulm following

## 2025-03-17 NOTE — PROGRESS NOTE ADULT - ASSESSMENT
53 yo M with multiple medical problems including CAD s/p PCI in 2024 s/p CABG x 3 on 24    1/: Intubated for hypoxia & left lung white out s/p awake bronch with removal of copious mucopurulent secretions  : s/p IABP insertion, sepsis/septic shock due to E coli   ESBL pneumonia, collapsed Left Lung, s/p multiple bronch    tracheostomy tube placement    VRE E. Faecium Bcx   +HSV PCR BAL   tissue cx from back abscess - Serratia/MRSA  - - intermittent bradycardia/junctional episodes with hypotension  2/3: off , off theophylline. iHD 1L UF  : bronch for Left lung collapse wound vac, 2decho w/ moderate pericardial effusion - similar as before, US abd: small - moderate ascites - monitor at this time.  Wound vac placed for sacral wounds  : iHD-1L UF  : bronch today off positive pressure   : concern for left food drop   2/10 : no acute issues - CXR shows improving left sided aeration, pt subjectively reports having difficulty while lying flat  : s/p Paracentesis 3.5 L removed, leukocytosis   : Ascites fluid PMN < 250 (less likely SBP)   sniff test yesterday showed paradoxical diaphragmatic motion    : mucus plugging but able to clear airway with aggressive pulmonary toileting.   : no vent requirement overnight, able to mobilize secretion with pulm toileting  hyperkalemia post HD - workup for possible recirculation underway   : On TC X 48 hours - ambulating well, no mucus plugging events  : HFTC x24hrs (40L 30%)  : iHD 1.5L UF  : iHD 2L  : trach downsized to 7, bronch  - cultures growing ESBL E Coli, sacral wound vac changed   3/6 Transfer to SDU, +PW isolated, +sacral wound vac in place, + Tube feeds running, next HD scheduled for tomorrow   3/7 VSS; Continue with current medication regimen.  Continue on TC 40% 02.  Plan for HD today.  Nocturnal feeding.  3/8  FEES 3/3 notable for poor secretion management evidenced unable to clear 2/2 weak cough B/l vocal cords appeared to be adducted at midline. ENT eval demonstrates  limited vocal cord abduction vs. questionable vocal cord paresis--no plan for ENT intervention at this time, No PMV . Local sacral wound care by PT.   3/9 HD tomorrow, US abd in am Tolerating pm feeds  3/10 US abd and HD today. CXR PA/LAT. Consider ENT to reassess VC paresis feasibility of decannulation   Disposition: Home PT / OT     3/11  suction prn.  OOB to chair.  Eliquis for a flutter.  ENT note appreciated  Maintain npo, S&S f/u  3/12 The pt is for HD today, plan on trach decannulation after dialysis. Suction x 1 this am for minimal secretions,   3/13 VSS speech and swallow evaluation today  labs with HD shower today  3/14 VSS for FEES today= Passed regular diet   HD today  3/15 needs encouragement for OOB to chair --> educated ALL MEAL to be OOB in chair. encouraged increase ambulation as tolerated. RN Matteo shahid Chest PT prn.   3/16 OOB to chair --> tolerating diet; encouraged to increase ambulation as tolerated; pending rpt US Abdomen on Monday for eval of ascites. Labs on HD day, HD planned for Monday per Renal.  3/17  Ambulate.  Tolerating a regular diet.  Abd US today r/o ascites   HD today. D/c planning 53 yo M with multiple medical problems including CAD s/p PCI in 2024 s/p CABG x 3 on 24    1/: Intubated for hypoxia & left lung white out s/p awake bronch with removal of copious mucopurulent secretions  : s/p IABP insertion, sepsis/septic shock due to E coli   ESBL pneumonia, collapsed Left Lung, s/p multiple bronch    tracheostomy tube placement    VRE E. Faecium Bcx   +HSV PCR BAL   tissue cx from back abscess - Serratia/MRSA  - - intermittent bradycardia/junctional episodes with hypotension  2/3: off , off theophylline. iHD 1L UF  : bronch for Left lung collapse wound vac, 2decho w/ moderate pericardial effusion - similar as before, US abd: small - moderate ascites - monitor at this time.  Wound vac placed for sacral wounds  : iHD-1L UF  : bronch today off positive pressure   : concern for left food drop   2/10 : no acute issues - CXR shows improving left sided aeration, pt subjectively reports having difficulty while lying flat  : s/p Paracentesis 3.5 L removed, leukocytosis   : Ascites fluid PMN < 250 (less likely SBP)   sniff test yesterday showed paradoxical diaphragmatic motion    : mucus plugging but able to clear airway with aggressive pulmonary toileting.   : no vent requirement overnight, able to mobilize secretion with pulm toileting  hyperkalemia post HD - workup for possible recirculation underway   : On TC X 48 hours - ambulating well, no mucus plugging events  : HFTC x24hrs (40L 30%)  : iHD 1.5L UF  : iHD 2L  : trach downsized to 7, bronch  - cultures growing ESBL E Coli, sacral wound vac changed   3/6 Transfer to SDU, +PW isolated, +sacral wound vac in place, + Tube feeds running, next HD scheduled for tomorrow   3/7 VSS; Continue with current medication regimen.  Continue on TC 40% 02.  Plan for HD today.  Nocturnal feeding.  3/8  FEES 3/3 notable for poor secretion management evidenced unable to clear 2/2 weak cough B/l vocal cords appeared to be adducted at midline. ENT eval demonstrates  limited vocal cord abduction vs. questionable vocal cord paresis--no plan for ENT intervention at this time, No PMV . Local sacral wound care by PT.   3/9 HD tomorrow, US abd in am Tolerating pm feeds  3/10 US abd and HD today. CXR PA/LAT. Consider ENT to reassess VC paresis feasibility of decannulation   Disposition: Home PT / OT     3/11  suction prn.  OOB to chair.  Eliquis for a flutter.  ENT note appreciated  Maintain npo, S&S f/u  3/12 The pt is for HD today, plan on trach decannulation after dialysis. Suction x 1 this am for minimal secretions,   3/13 VSS speech and swallow evaluation today  labs with HD shower today  3/14 VSS for FEES today= Passed regular diet   HD today  3/15 needs encouragement for OOB to chair --> educated ALL MEAL to be OOB in chair. encouraged increase ambulation as tolerated. MARIA ELENA shahid Chest PT prn.   3/16 OOB to chair --> tolerating diet; encouraged to increase ambulation as tolerated; pending rpt US Abdomen on Monday for eval of ascites. Labs on HD day, HD planned for Monday per Renal.  3/17  Ambulate.  Tolerating a regular diet.  Abd US d/c as d/w Dr Rivers HD today. D/c planning  Tx to floor

## 2025-03-17 NOTE — PROVIDER CONTACT NOTE (OTHER) - ACTION/TREATMENT ORDERED:
Explained to patient need for Peripheral IV Access for IV Drugs, Fluids,  and Emergency IV Medications . DENISSE Honeycutt NP spoke to patient , however patient still refused Peripheral IV access.

## 2025-03-17 NOTE — PROVIDER CONTACT NOTE (OTHER) - ASSESSMENT
The patient's wound vac has available at bedside leaking alarm. Attempted to troubleshoot without success.
V/S stable. Denies chest pain/ discomfort. No shortness of breath. No palpitation. Patient on Hemodialysis at bedside.
Other VSS, patient with no complaints or concerns at this point.
Patient stable. Left upper arm precaution due to Left AV Fistula- Access  for Hemodialysis
aox4, VS WDL
Upon morning assessment, patient's NG tube was not secured. Tape was loose. Tube feedings were not in progress (nocturnal only) NG tube secured by this RN with statlock device. Verified placement by instilling air into tubing as requested by NP Tarah.
Patient's wound vac is alarming. Attempted to troubleshoot without success. Contacted on call physical therapist Allison who stated she was unable to see the patient today and the patient would be seen first thing in the morning.
VSS.
pt is safe, no active s/s of distress
Patient received on 2L-6L nc to HFNC 50/50 to 50/80, PO2 remains low on serial ABGs. Patient ambulated, incentive spirometer, multiple rounds of chest PT
Pt A&Ox4. NPO c tube feed status discussed multiple times today as well as results of Sp & Sw and plan for FEEST monday. PT admitted to eating 1 then 2 peanut M&Ms. M&M dye noted on NGT.
pt is asleep, calm, no s/s of distress, has a WNL bp,
Patient NPO for procedure and has a HR of  when awake. Medications reviewed with providers and okay to give. No current order of NPO with the exception of medications. NGT in place for administration; verification of placement checked via air technique.
Patient is noted to be vomited and be feeling nauseous. Patient refusing oral medications at this time. Vital signs within normal limits.
Pt is A&Ox4, pt requested that we do not wake him In the morning until 7AM for medications, lab, and vitals. Pt was educated on the importance of blood pressure medication as he takes carvedilol 12.5 mg in the AM, importance of labs, and vitals. Pt still refusing
alert

## 2025-03-17 NOTE — PROVIDER CONTACT NOTE (OTHER) - ACTION/TREATMENT ORDERED:
Myself and MYRA Jones educated the pt as to why it is important to have IV, access and safety concerns of not having an IV. Pt still refused. RN and PA educated pt on risks of not having an IV during hospitalization. Pt understood, still refused. Will continue to educate pt of risks throughout shift in hopes of placing an IV.

## 2025-03-17 NOTE — PROVIDER CONTACT NOTE (OTHER) - NAME OF MD/NP/PA/DO NOTIFIED:
[facility]- Crispin Honeycutt NP
JAX Marie
MARIAMA LECHUGA
MYRA Rico
JAX SALAZAR
[facility] Pola Jones NP
MD AMADO, JAX GAO
Sheryl Holder, NP
Tarah Harris NP
Cassie RENTERIA
MYRA Garibay
Pola RENTERIA
Tarah Harris, NP
MYRA ACKERMAN
MD Rivas and MYRA Bergman
MYRA Medina
Xavier NICHOLS

## 2025-03-17 NOTE — PROVIDER CONTACT NOTE (OTHER) - REASON
N/C Pt found to be eating Peanut M&Ms
Patient vomiting and nauseous
Pt refusing AM labs, vitals, and medication
PT BRADYCARDIC
pt bradycardic
As per telemetry, patient had 6 beats wide complex Tachycardia
Increase in oxygen requirement
Patient /83
wound vac has a leak
Lab Draws
Wound vac issue
Wound vac issues
sonya to give meds in AM with current NPO order 2/6/25
Patient Refused Peripheral IV access Insertion. Patient states he's" hard stick " for Peripheral IV
unsecured NG tube
Patient refusing to follow instructions, sneaking candy
Pt refused IV access

## 2025-03-18 ENCOUNTER — TRANSCRIPTION ENCOUNTER (OUTPATIENT)
Age: 55
End: 2025-03-18

## 2025-03-18 RX ORDER — APIXABAN 2.5 MG/1
2.5 TABLET, FILM COATED ORAL
Refills: 0 | Status: DISCONTINUED | OUTPATIENT
Start: 2025-03-18 | End: 2025-03-18

## 2025-03-18 RX ORDER — APIXABAN 2.5 MG/1
2.5 TABLET, FILM COATED ORAL
Refills: 0 | Status: DISCONTINUED | OUTPATIENT
Start: 2025-03-19 | End: 2025-03-19

## 2025-03-18 RX ADMIN — ATORVASTATIN CALCIUM 80 MILLIGRAM(S): 80 TABLET, FILM COATED ORAL at 21:05

## 2025-03-18 RX ADMIN — OXYCODONE HYDROCHLORIDE 10 MILLIGRAM(S): 30 TABLET ORAL at 18:53

## 2025-03-18 RX ADMIN — OXYCODONE HYDROCHLORIDE 10 MILLIGRAM(S): 30 TABLET ORAL at 18:10

## 2025-03-18 RX ADMIN — METOPROLOL SUCCINATE 25 MILLIGRAM(S): 50 TABLET, EXTENDED RELEASE ORAL at 18:11

## 2025-03-18 RX ADMIN — Medication 5 MILLIGRAM(S): at 22:06

## 2025-03-18 RX ADMIN — Medication 81 MILLIGRAM(S): at 09:26

## 2025-03-18 RX ADMIN — OXYCODONE HYDROCHLORIDE 10 MILLIGRAM(S): 30 TABLET ORAL at 09:26

## 2025-03-18 RX ADMIN — METOPROLOL SUCCINATE 25 MILLIGRAM(S): 50 TABLET, EXTENDED RELEASE ORAL at 06:29

## 2025-03-18 RX ADMIN — Medication 1 APPLICATION(S): at 06:39

## 2025-03-18 RX ADMIN — Medication 300 MILLIGRAM(S): at 09:29

## 2025-03-18 RX ADMIN — SODIUM ZIRCONIUM CYCLOSILICATE 10 GRAM(S): 5 POWDER, FOR SUSPENSION ORAL at 06:29

## 2025-03-18 RX ADMIN — Medication 500 MILLIGRAM(S): at 09:25

## 2025-03-18 RX ADMIN — OXYCODONE HYDROCHLORIDE 10 MILLIGRAM(S): 30 TABLET ORAL at 00:30

## 2025-03-18 RX ADMIN — OXYCODONE HYDROCHLORIDE 10 MILLIGRAM(S): 30 TABLET ORAL at 10:25

## 2025-03-18 RX ADMIN — METHOCARBAMOL 500 MILLIGRAM(S): 500 TABLET, FILM COATED ORAL at 13:52

## 2025-03-18 RX ADMIN — MIRTAZAPINE 7.5 MILLIGRAM(S): 30 TABLET, FILM COATED ORAL at 21:05

## 2025-03-18 RX ADMIN — METHOCARBAMOL 500 MILLIGRAM(S): 500 TABLET, FILM COATED ORAL at 21:05

## 2025-03-18 RX ADMIN — OXYCODONE HYDROCHLORIDE 10 MILLIGRAM(S): 30 TABLET ORAL at 00:02

## 2025-03-18 RX ADMIN — Medication 100 MILLIGRAM(S): at 22:06

## 2025-03-18 NOTE — PROGRESS NOTE ADULT - SUBJECTIVE AND OBJECTIVE BOX
Patient discussed on morning rounds with Dr. Rivers INCOMPLETE NOTE, PT NOT SEEN YET    Operation / Date: s/p CABG x 3 on 12/30/24    SUBJECTIVE ASSESSMENT:  Patient   Denies CP/SOB/N/V/D/dizziness/cough/fever/chills.       Vital Signs Last 24 Hrs  T(C): 36.7 (18 Mar 2025 06:05), Max: 36.9 (17 Mar 2025 12:00)  T(F): 98.1 (18 Mar 2025 06:05), Max: 98.4 (17 Mar 2025 12:00)  HR: 67 (18 Mar 2025 06:05) (67 - 117)  BP: 119/61 (18 Mar 2025 06:05) (110/60 - 160/91)  BP(mean): 76 (17 Mar 2025 21:08) (76 - 118)  RR: 20 (18 Mar 2025 06:05) (18 - 20)  SpO2: 98% (18 Mar 2025 06:05) (93% - 98%)    Parameters below as of 18 Mar 2025 06:05  Patient On (Oxygen Delivery Method): room air      I&O's Detail    16 Mar 2025 07:01  -  17 Mar 2025 07:00  --------------------------------------------------------  IN:    Oral Fluid: 1060 mL  Total IN: 1060 mL    OUT:  Total OUT: 0 mL    Total NET: 1060 mL      17 Mar 2025 07:01  -  18 Mar 2025 06:47  --------------------------------------------------------  IN:    Oral Fluid: 1220 mL  Total IN: 1220 mL    OUT:    Other (mL): 2000 mL    Stool (mL): 1 mL    Voided (mL): 0 mL  Total OUT: 2001 mL    Total NET: -781 mL      PHYSICAL EXAM:  GEN: NAD, looks comfortable  Psych: Mood appropriate  Neuro: A&Ox3.  No focal deficits.  Moving all extremities.   HEENT: No obvious abnormalities  CV: S1S2, regular, no murmurs appreciated.  No carotid bruits.  No JVD  Lungs: Clear B/L.  No wheezing, rales or rhonchi  ABD: Soft, non-tender, non-distended.  +Bowel sounds  EXT: Warm and well perfused.  No peripheral edema noted  Musculoskeletal: Moving all extremities with normal ROM, no joint swelling  PV: Pedal pulses palpable  Incision Sites: MSI well healed    LABS:                        9.2    10.87 )-----------( 225      ( 17 Mar 2025 14:54 )             30.5     PT/INR - ( 17 Mar 2025 14:54 )   PT: 16.6 sec;   INR: 1.45 ratio         PTT - ( 17 Mar 2025 14:54 )  PTT:37.5 sec    03-17    140  |  100  |  57[H]  ----------------------------<  87  5.2   |  20[L]  |  7.06[H]    Ca    8.9      17 Mar 2025 14:54  Phos  4.1     03-17  Mg     2.4     03-17    TPro  7.1  /  Alb  2.8[L]  /  TBili  0.4  /  DBili  x   /  AST  15  /  ALT  10  /  AlkPhos  101  03-17      Urinalysis Basic - ( 17 Mar 2025 14:54 )    Color: x / Appearance: x / SG: x / pH: x  Gluc: 87 mg/dL / Ketone: x  / Bili: x / Urobili: x   Blood: x / Protein: x / Nitrite: x   Leuk Esterase: x / RBC: x / WBC x   Sq Epi: x / Non Sq Epi: x / Bacteria: x        MEDICATIONS  (STANDING):  ascorbic acid 500 milliGRAM(s) Oral daily  aspirin  chewable 81 milliGRAM(s) Oral daily  atorvastatin 80 milliGRAM(s) Oral at bedtime  buDESOnide    Inhalation Suspension 0.5 milliGRAM(s) Inhalation two times a day  chlorhexidine 4% Liquid 1 Application(s) Topical <User Schedule>  dextrose 50% Injectable 50 milliLiter(s) IV Push every 15 minutes  dextrose 50% Injectable 25 Gram(s) IV Push once  epoetin ignacia (EPOGEN) Injectable 62782 Unit(s) IV Push <User Schedule>  ferrous    sulfate Liquid 300 milliGRAM(s) Enteral Tube daily  insulin lispro (ADMELOG) corrective regimen sliding scale   SubCutaneous three times a day before meals  insulin lispro (ADMELOG) corrective regimen sliding scale   SubCutaneous at bedtime  melatonin 5 milliGRAM(s) Oral at bedtime  methocarbamol 500 milliGRAM(s) Oral every 8 hours  metoprolol tartrate 25 milliGRAM(s) Oral two times a day  mirtazapine 7.5 milliGRAM(s) Oral at bedtime  sodium chloride 0.65% Nasal 1 Spray(s) Both Nostrils every 12 hours  sodium chloride 0.9% for Nebulization 3 milliLiter(s) Nebulizer every 6 hours  sodium zirconium cyclosilicate 10 Gram(s) Oral <User Schedule>  traZODone 100 milliGRAM(s) Oral at bedtime    MEDICATIONS  (PRN):  acetaminophen     Tablet .. 650 milliGRAM(s) Oral every 6 hours PRN Mild Pain (1 - 3)  benzocaine/menthol Lozenge 1 Lozenge Oral every 4 hours PRN Sore Throat  lidocaine/prilocaine Cream 1 Application(s) Topical daily PRN pre-HD  oxyCODONE    IR 10 milliGRAM(s) Oral every 6 hours PRN Severe Pain (7 - 10)  oxyCODONE    IR 5 milliGRAM(s) Oral every 4 hours PRN Moderate Pain (4 - 6)  simethicone 80 milliGRAM(s) Chew three times a day PRN Gas  sodium chloride 0.9% lock flush 10 milliLiter(s) IV Push every 1 hour PRN Pre/post blood products, medications, blood draw, and to maintain line patency        RADIOLOGY & ADDITIONAL TESTS:     Patient discussed on morning rounds with Dr. Rivers     Operation / Date: s/p CABG x 3 on 12/30/24    SUBJECTIVE ASSESSMENT:  Patient is refusing to let me examine him.  His pacing wires were pulled this morning by JAX Honeycutt.  He is now resting and does not want to be examined.  He eventually let me listen to one side of his lungs, and I noticed old tie downs which he adamantly refused to let me remove.  Uses a "foot brace" for his foot drop at night, due to the pain.    Denies CP/SOB/N/V/D/dizziness/cough/fever/chills.       Vital Signs Last 24 Hrs  T(C): 36.7 (18 Mar 2025 06:05), Max: 36.9 (17 Mar 2025 12:00)  T(F): 98.1 (18 Mar 2025 06:05), Max: 98.4 (17 Mar 2025 12:00)  HR: 67 (18 Mar 2025 06:05) (67 - 117)  BP: 119/61 (18 Mar 2025 06:05) (110/60 - 160/91)  BP(mean): 76 (17 Mar 2025 21:08) (76 - 118)  RR: 20 (18 Mar 2025 06:05) (18 - 20)  SpO2: 98% (18 Mar 2025 06:05) (93% - 98%)    Parameters below as of 18 Mar 2025 06:05  Patient On (Oxygen Delivery Method): room air      I&O's Detail    16 Mar 2025 07:01  -  17 Mar 2025 07:00  --------------------------------------------------------  IN:    Oral Fluid: 1060 mL  Total IN: 1060 mL    OUT:  Total OUT: 0 mL    Total NET: 1060 mL      17 Mar 2025 07:01  -  18 Mar 2025 06:47  --------------------------------------------------------  IN:    Oral Fluid: 1220 mL  Total IN: 1220 mL    OUT:    Other (mL): 2000 mL    Stool (mL): 1 mL    Voided (mL): 0 mL  Total OUT: 2001 mL    Total NET: -781 mL      PHYSICAL EXAM:  GEN: NAD, looks sleepy; Lying on his side.  Not compliant with questioning and physical exam.   Psych: Mood appropriate  Neuro: A&Ox3.  No focal deficits.  Moving all extremities.   HEENT: No obvious abnormalities  CV: S1S2, regular, no murmurs appreciated.  No carotid bruits.  No JVD  Lungs: Clear B/L.  No wheezing, rales or rhonchi  ABD: Soft, non-tender, non-distended.  +Bowel sounds  EXT: Warm and well perfused.  No peripheral edema noted  Musculoskeletal: Moving all extremities with normal ROM, no joint swelling  PV: Pedal pulses palpable  Incision Sites: MSI well healed    LABS:                        9.2    10.87 )-----------( 225      ( 17 Mar 2025 14:54 )             30.5     PT/INR - ( 17 Mar 2025 14:54 )   PT: 16.6 sec;   INR: 1.45 ratio         PTT - ( 17 Mar 2025 14:54 )  PTT:37.5 sec    03-17    140  |  100  |  57[H]  ----------------------------<  87  5.2   |  20[L]  |  7.06[H]    Ca    8.9      17 Mar 2025 14:54  Phos  4.1     03-17  Mg     2.4     03-17    TPro  7.1  /  Alb  2.8[L]  /  TBili  0.4  /  DBili  x   /  AST  15  /  ALT  10  /  AlkPhos  101  03-17      Urinalysis Basic - ( 17 Mar 2025 14:54 )    Color: x / Appearance: x / SG: x / pH: x  Gluc: 87 mg/dL / Ketone: x  / Bili: x / Urobili: x   Blood: x / Protein: x / Nitrite: x   Leuk Esterase: x / RBC: x / WBC x   Sq Epi: x / Non Sq Epi: x / Bacteria: x      MEDICATIONS  (STANDING):  ascorbic acid 500 milliGRAM(s) Oral daily  aspirin  chewable 81 milliGRAM(s) Oral daily  atorvastatin 80 milliGRAM(s) Oral at bedtime  buDESOnide    Inhalation Suspension 0.5 milliGRAM(s) Inhalation two times a day  chlorhexidine 4% Liquid 1 Application(s) Topical <User Schedule>  dextrose 50% Injectable 50 milliLiter(s) IV Push every 15 minutes  dextrose 50% Injectable 25 Gram(s) IV Push once  epoetin ignacia (EPOGEN) Injectable 01449 Unit(s) IV Push <User Schedule>  ferrous    sulfate Liquid 300 milliGRAM(s) Enteral Tube daily  insulin lispro (ADMELOG) corrective regimen sliding scale   SubCutaneous three times a day before meals  insulin lispro (ADMELOG) corrective regimen sliding scale   SubCutaneous at bedtime  melatonin 5 milliGRAM(s) Oral at bedtime  methocarbamol 500 milliGRAM(s) Oral every 8 hours  metoprolol tartrate 25 milliGRAM(s) Oral two times a day  mirtazapine 7.5 milliGRAM(s) Oral at bedtime  sodium chloride 0.65% Nasal 1 Spray(s) Both Nostrils every 12 hours  sodium chloride 0.9% for Nebulization 3 milliLiter(s) Nebulizer every 6 hours  sodium zirconium cyclosilicate 10 Gram(s) Oral <User Schedule>  traZODone 100 milliGRAM(s) Oral at bedtime    MEDICATIONS  (PRN):  acetaminophen     Tablet .. 650 milliGRAM(s) Oral every 6 hours PRN Mild Pain (1 - 3)  benzocaine/menthol Lozenge 1 Lozenge Oral every 4 hours PRN Sore Throat  lidocaine/prilocaine Cream 1 Application(s) Topical daily PRN pre-HD  oxyCODONE    IR 10 milliGRAM(s) Oral every 6 hours PRN Severe Pain (7 - 10)  oxyCODONE    IR 5 milliGRAM(s) Oral every 4 hours PRN Moderate Pain (4 - 6)  simethicone 80 milliGRAM(s) Chew three times a day PRN Gas  sodium chloride 0.9% lock flush 10 milliLiter(s) IV Push every 1 hour PRN Pre/post blood products, medications, blood draw, and to maintain line patency        RADIOLOGY & ADDITIONAL TESTS:

## 2025-03-18 NOTE — PROGRESS NOTE ADULT - PROBLEM SELECTOR PLAN 2
Renal following   HD Monday / Wednesday / Friday  Continue Epogen 10,000 units IV TIW on Monday / Wednesday / Friday with HD Renal following   HD Monday / Wednesday / Friday   Continue Epogen 10,000 units IV TIW on Monday / Wednesday / Friday with HD

## 2025-03-18 NOTE — PROGRESS NOTE ADULT - SUBJECTIVE AND OBJECTIVE BOX
[facility]    Dr. Nica Styles     Office [phone] (9 am to 5 pm)  Service: [phone] (5pm to 9am)  Also Available on TEAMS      RENAL PROGRESS NOTE: DATE OF SERVICE 03-18-25 @ 10:45    Patient is a 54y old  Male who presents with a chief complaint of CAD S/P CABG (17 Mar 2025 07:38)      Patient seen and examined at bedside. No chest pain/sob    VITALS:  T(F): 98.1 (03-18-25 @ 06:05), Max: 98.4 (03-17-25 @ 12:00)  HR: 67 (03-18-25 @ 06:05)  BP: 119/61 (03-18-25 @ 06:05)  RR: 20 (03-18-25 @ 06:05)  SpO2: 98% (03-18-25 @ 06:05)  Wt(kg): --    03-17 @ 07:01  -  03-18 @ 07:00  --------------------------------------------------------  IN: 1220 mL / OUT: 2001 mL / NET: -781 mL    03-18 @ 07:01  -  03-18 @ 10:45  --------------------------------------------------------  IN: 300 mL / OUT: 0 mL / NET: 300 mL          PHYSICAL EXAM:  Constitutional: NAD  Neck: No JVD  Respiratory: CTAB, no wheezes, rales or rhonchi  Cardiovascular: S1, S2, RRR  Gastrointestinal: BS+, soft, NT/ND  Extremities: No peripheral edema    Hospital Medications:   MEDICATIONS  (STANDING):  ascorbic acid 500 milliGRAM(s) Oral daily  aspirin  chewable 81 milliGRAM(s) Oral daily  atorvastatin 80 milliGRAM(s) Oral at bedtime  buDESOnide    Inhalation Suspension 0.5 milliGRAM(s) Inhalation two times a day  chlorhexidine 4% Liquid 1 Application(s) Topical <User Schedule>  dextrose 50% Injectable 50 milliLiter(s) IV Push every 15 minutes  dextrose 50% Injectable 25 Gram(s) IV Push once  epoetin ignacia (EPOGEN) Injectable 60482 Unit(s) IV Push <User Schedule>  ferrous    sulfate Liquid 300 milliGRAM(s) Enteral Tube daily  insulin lispro (ADMELOG) corrective regimen sliding scale   SubCutaneous three times a day before meals  insulin lispro (ADMELOG) corrective regimen sliding scale   SubCutaneous at bedtime  melatonin 5 milliGRAM(s) Oral at bedtime  methocarbamol 500 milliGRAM(s) Oral every 8 hours  metoprolol tartrate 25 milliGRAM(s) Oral two times a day  mirtazapine 7.5 milliGRAM(s) Oral at bedtime  sodium chloride 0.65% Nasal 1 Spray(s) Both Nostrils every 12 hours  sodium chloride 0.9% for Nebulization 3 milliLiter(s) Nebulizer every 6 hours  sodium zirconium cyclosilicate 10 Gram(s) Oral <User Schedule>  traZODone 100 milliGRAM(s) Oral at bedtime      LABS:  03-17    140  |  100  |  57[H]  ----------------------------<  87  5.2   |  20[L]  |  7.06[H]    Ca    8.9      17 Mar 2025 14:54  Phos  4.1     03-17  Mg     2.4     03-17    TPro  7.1  /  Alb  2.8[L]  /  TBili  0.4  /  DBili      /  AST  15  /  ALT  10  /  AlkPhos  101  03-17    Creatinine Trend: 7.06 <--, 5.45 <--, 5.55 <--    Albumin: 2.8 g/dL (03-17 @ 14:54)  Phosphorus: 4.1 mg/dL (03-17 @ 14:54)                              9.2    10.87 )-----------( 225      ( 17 Mar 2025 14:54 )             30.5     Urine Studies:  Urinalysis - [03-17-25 @ 14:54]      Color  / Appearance  / SG  / pH       Gluc 87 / Ketone   / Bili  / Urobili        Blood  / Protein  / Leuk Est  / Nitrite       RBC  / WBC  / Hyaline  / Gran  / Sq Epi  / Non Sq Epi  / Bacteria       Iron 29, TIBC 143, %sat 20      [12-19-24 @ 05:00]  Ferritin 904      [12-19-24 @ 05:00]  TSH 4.75      [12-24-24 @ 06:50]    HBsAg Nonreact      [03-14-25 @ 15:50]  HBcAb Nonreact      [03-14-25 @ 15:50]  HCV 0.46, Nonreact      [03-14-25 @ 15:50]      RADIOLOGY & ADDITIONAL STUDIES:

## 2025-03-18 NOTE — PROGRESS NOTE ADULT - ASSESSMENT
53 y/o male with PMHx of HTN, HLD, ESRD on HD MWF via LUE AVF, ascites (requiring repeat paracenteses q4-6wks), NICM with moderate LV dysfunction w/ EF 35-40%() and CAD s/p atherectomy + SALLIE to D1(2024) presents to [facility] transferred from [facility]. Patient initially presented to [facility] ED with shortness of breath. Of note he was recently admitted from 24-24 for acute cholecystitis s/p cholecystectomy. In [facility] ED, pt was hypoxic to 86% on RA, trop 1.7K, EKG no new changes, CXR fluid overload. Admitted for AHRF 2/2 fluid overload. Pt received HD on , ,  and 24. DAPT was resumed, TTE showed improvement in LVEF to 40-45%. Stress test was abnormal with 1mm inferior STD, reversible ischemia in the inferior wall and fixed defects in the lateral, anterior and apical walls with post stress EF of 24%. Pt was then transferred to [facility] for cardiac cath which showed pLAD 70% ISR, mLCx 70%, D1 severe dz. Patient now transferred to [facility] CTS Dr. Rivers for CABG eval. Patient denies any current SOB or chest pain on admission.     24 CABGx3 (LIMA-LAD; SVG-Diag; SVG-leftPL)  1/: Intubated for hypoxia & left lung white out s/p awake bronch with removal of copious mucopurulent secretions  : s/p IABP insertion, sepsis/septic shock due to E coli   ESBL pneumonia, collapsed Left Lung, s/p multiple bronch    tracheostomy tube placement    VRE E. Faecium Bcx   +HSV PCR BAL   tissue cx from back abscess - Serratia/MRSA  - - intermittent bradycardia/junctional episodes with hypotension  2/3: off , off theophylline. iHD 1L UF  : bronch for Left lung collapse wound vac, 2decho w/ moderate pericardial effusion - similar as before, US abd: small - moderate ascites - monitor at this time.  Wound vac placed for sacral wounds  : iHD-1L UF  : bronch today off positive pressure  2/8 : concern for left food drop   2/10 : no acute issues - CXR shows improving left sided aeration, pt subjectively reports having difficulty while lying flat  : s/p Paracentesis 3.5 L removed, leukocytosis   : Ascites fluid PMN < 250 (less likely SBP)   sniff test yesterday showed paradoxical diaphragmatic motion    : mucus plugging but able to clear airway with aggressive pulmonary toileting.   : no vent requirement overnight, able to mobilize secretion with pulm toileting  hyperkalemia post HD - workup for possible recirculation underway   : On TC X 48 hours - ambulating well, no mucus plugging events  : HFTC x24hrs (40L 30%)  : iHD 1.5L UF  : iHD 2L  : trach downsized to 7, bronch  - cultures growing ESBL E Coli, sacral wound vac changed   3/6 Transfer to SDU, +PW isolated, +sacral wound vac in place, + Tube feeds running, next HD scheduled for tomorrow   3/7 VSS; Continue with current medication regimen.  Continue on TC 40% 02.  Plan for HD today.  Nocturnal feeding.  3/8  FEES 3/3 notable for poor secretion management evidenced unable to clear 2/2 weak cough B/l vocal cords appeared to be adducted at midline. ENT eval demonstrates  limited vocal cord abduction vs. questionable vocal cord paresis--no plan for ENT intervention at this time, No PMV . Local sacral wound care by PT.   3/9 HD tomorrow, US abd in am Tolerating pm feeds  3/10 US abd and HD today. CXR PA/LAT. Consider ENT to reassess VC paresis feasibility of decannulation   Disposition: Home PT / OT     3/11  suction prn.  OOB to chair.  Eliquis for a flutter.  ENT note appreciated  Maintain npo, S&S f/u  3/12 The pt is for HD today, plan on trach decannulation after dialysis. Suction x 1 this am for minimal secretions,   3/13 VSS speech and swallow evaluation today  labs with HD shower today  3/14 VSS for FEES today= Passed regular diet   HD today  3/15 needs encouragement for OOB to chair --> educated ALL MEAL to be OOB in chair. encouraged increase ambulation as tolerated. RN Matteo aware Chest PT prn.   3/16 OOB to chair --> tolerating diet; encouraged to increase ambulation as tolerated; pending rpt US Abdomen on Monday for eval of ascites. Labs on HD day, HD planned for Monday per Renal.  3/17  Ambulate.  Tolerating a regular diet.  Abd US d/c as d/w Dr Rivers HD today. D/c planning  Tx to floor  3/18 HD tomorrow.

## 2025-03-18 NOTE — PROGRESS NOTE ADULT - PROBLEM SELECTOR PLAN 1
Continue with ASA 81 mg PO Daily.   Continue with Lopressor 25 mg PO BID   Continue with Lipitor 80 mg PO HS  Maintain + PW isolated - PACING WIRES PULLED 3/18. **Resume Eliquis tomorrow am (3/19/25)  +Tie downs, to come out.    Trach Decannulated 3/11  Increase activity as tolerated.   PO protonix for PUD prophylaxis.   Encouraged Chest PT / Pulmonary toileting and Incentive spirometry every 1 hour x 10 while awake.    Sternal precautions and wound care  Daily Shower   D/C plan home with PT/OT once medically cleared - ? 3/19 or 3/20  Plan of care discussed with attending

## 2025-03-18 NOTE — PROGRESS NOTE ADULT - PROBLEM SELECTOR PLAN 1
Continue with ASA 81 mg PO Daily.   Continue with Lopressor 25 mg PO BID   Continue with Lipitor 80 mg PO HS  Maintain + PW isolated  Decannulated 3/11  Increase activity as tolerated.   Encourage Chest PT / Pulmonary toileting and Incentive spirometry every 1 hour x 10 while awake.   Continue with Protonix 40 mg IV daily for PUD prophylaxis.   Sternal precautions and wound care  Daily Shower   C/W Eliquis 2.5mg BID per Dr. Rivers  D/C plan home with PT/OT once medically cleared   Plan of care discussed with attending Continue with ASA 81 mg PO Daily.   Continue with Lopressor 25 mg PO BID   Continue with Lipitor 80 mg PO HS  Maintain + PW isolated - PACING WIRES PULLED 3/18. **Resume Eliquis tomorrow am (3/19/25)  +Tie downs, to come out.    Trach Decannulated 3/11  Increase activity as tolerated.   PO protonix for PUD prophylaxis.   Encouraged Chest PT / Pulmonary toileting and Incentive spirometry every 1 hour x 10 while awake.    Sternal precautions and wound care  Daily Shower   D/C plan home with PT/OT once medically cleared - ? 3/19 or 3/20  Plan of care discussed with attending

## 2025-03-18 NOTE — DISCHARGE NOTE NURSING/CASE MANAGEMENT/SOCIAL WORK - PATIENT PORTAL LINK FT
You can access the FollowMyHealth Patient Portal offered by [facility] by registering at the following website: [email or web address]. By joining [facility]’s FollowMyHealth portal, you will also be able to view your health information using other applications (apps) compatible with our system.

## 2025-03-18 NOTE — PROGRESS NOTE ADULT - ASSESSMENT
54M with PMH of HTN, HLD, ESRD on HD MWF via LUE AVF, ascites (requiring repeat paracenteses q4-6wks), NICM with moderate LV dysfunction w/ EF 35-40%(2023) and CAD s/p atherectomy + SALLIE to D1(4/11/2024) presents to [facility] transferred from [facility] for CABG eval  Nephro on board for HD needs.    ESRD on HD   Nephrologist Dr. Bailey  [facility] Schedule / Access:  LUE AVF  Center: [facility]  Pt s/p HD on 2/19 and 2/21.  s/p HD 2/24 uf 1.5L  S/p HD on 02/26 2 L UF and 02/28 2 L UF again   S/p HD on 03/03 and 03/5, 03/07  S/p HD on 03/10 AND 03/12  S/p HD on 03/14 1.5 L removed and on 03/17  HD tomm per scheduele   Keep MAP>65  Renal Diet  HD consent in chart   Pending labs since 03/10    Hyper/Hypokalemia  Pt intermittently Hyperkalemia  HD as scheduled  now improved  Low K diet.  C/W Lokelma 10gm on nonHD days.  Monitor K.    HTN  BP fluctuating; controlled.  UF w/ HD as BP permits.  Low sodium diet.  Management per CTICU  Monitor BP     CKD MBD  Check PTH   Low PO4 diet.  Not on binder.  Monitor Ca , PO4 daily     Anemia  CRISTIANE with HD  Repeat iron studies.  Transfuse if hgb <7    CAD with multivessel disease  CTICU following  s/p CABG 12/30    Hypoxic respiratory failure   S/p tracheostomy 01/18 and decannulated on 03/11  Per surgery  Pulm following

## 2025-03-18 NOTE — PROGRESS NOTE ADULT - SUBJECTIVE AND OBJECTIVE BOX
MR#[id number]  PATIENT NAME:RAAD CANO    DATE OF SERVICE: 03-18-25 @ 06:15  Patient was seen and examined by Solo Quick MD on    03-18-25 @ 06:15 .  Interim events noted.Consultant notes ,Labs,Telemetry reviewed by me       HOSPITAL COURSE: HPI:  54M with PMH of HTN, HLD, ESRD on HD MWF via LUE AVF, ascites (requiring repeat paracenteses q4-6wks), NICM with moderate LV dysfunction w/ EF 35-40%(2023) and CAD s/p atherectomy + SALLIE to D1(4/11/2024) presents to [facility] transferred from [facility]. Patient initially presented to [facility] ED with shortness of breath. Of note he was recently admitted from 09/27-12/02 for acute cholecystitis s/p cholecystectomy. In [facility] ED, pt was hypoxic to 86% on RA, trop 1.7K, EKG no new changes, CXR fluid overload. Admitted for AHRF 2/2 fluid overload. Pt received HD on 12/18, 12/19, 12/20 and 12/22. DAPT was resumed, TTE showed improvement in LVEF to 40-45%. Stress test was abnormal with 1mm inferior STD, reversible ischemia in the inferior wall and fixed defects in the lateral, anterior and apical walls with post stress EF of 24%. Pt was then transferred to [facility] for cardiac cath which showed pLAD 70% ISR, mLCx 70%, D1 severe dz. Patient now transferred to [facility] CTS Dr. Rivers for CABG eval. Patient denies any current SOB or chest pain on admission. Otherwise denies headaches, abdominal pain, urinary or bowel changes, fevers, chills, N/V/D, sick contacts.  (24 Dec 2024 05:07)      INTERIM EVENTS:Patient seen at bedside ,interim events noted.      PMH -reviewed admission note, no change since admission  HEART FAILURE: Acute[ ]Chronic[ ] Systolic[ ] Diastolic[ ] Combined Systolic and Diastolic[ ]  CAD[ ] CABG[ ] PCI[ ]  DEVICES[ ] PPM[ ] ICD[ ] ILR[ ]  ATRIAL FIBRILLATION[ ] Paroxysmal[ ] Permanent[ ] CHADS2-[  ]  GHASSAN[ ] CKD1[ ] CKD2[ ] CKD3[ ] CKD4[ ] ESRD[ ]  COPD[ ] HTN[ ]   DM[ ] Type1[ ] Type 2[ ]   CVA[ ] Paresis[ ]    AMBULATION: Assisted[ ] Cane/walker[ ] Independent[ ]    MEDICATIONS  (STANDING):  ascorbic acid 500 milliGRAM(s) Oral daily  aspirin  chewable 81 milliGRAM(s) Oral daily  atorvastatin 80 milliGRAM(s) Oral at bedtime  buDESOnide    Inhalation Suspension 0.5 milliGRAM(s) Inhalation two times a day  chlorhexidine 4% Liquid 1 Application(s) Topical <User Schedule>  dextrose 50% Injectable 50 milliLiter(s) IV Push every 15 minutes  dextrose 50% Injectable 25 Gram(s) IV Push once  epoetin ignacia (EPOGEN) Injectable 42614 Unit(s) IV Push <User Schedule>  ferrous    sulfate Liquid 300 milliGRAM(s) Enteral Tube daily  insulin lispro (ADMELOG) corrective regimen sliding scale   SubCutaneous three times a day before meals  insulin lispro (ADMELOG) corrective regimen sliding scale   SubCutaneous at bedtime  melatonin 5 milliGRAM(s) Oral at bedtime  methocarbamol 500 milliGRAM(s) Oral every 8 hours  metoprolol tartrate 25 milliGRAM(s) Oral two times a day  mirtazapine 7.5 milliGRAM(s) Oral at bedtime  pantoprazole    Tablet 40 milliGRAM(s) Oral before breakfast  sodium chloride 0.65% Nasal 1 Spray(s) Both Nostrils every 12 hours  sodium chloride 0.9% for Nebulization 3 milliLiter(s) Nebulizer every 6 hours  sodium zirconium cyclosilicate 10 Gram(s) Oral <User Schedule>  traZODone 100 milliGRAM(s) Oral at bedtime    MEDICATIONS  (PRN):  acetaminophen     Tablet .. 650 milliGRAM(s) Oral every 6 hours PRN Mild Pain (1 - 3)  benzocaine/menthol Lozenge 1 Lozenge Oral every 4 hours PRN Sore Throat  lidocaine/prilocaine Cream 1 Application(s) Topical daily PRN pre-HD  oxyCODONE    IR 5 milliGRAM(s) Oral every 4 hours PRN Moderate Pain (4 - 6)  oxyCODONE    IR 10 milliGRAM(s) Oral every 6 hours PRN Severe Pain (7 - 10)  simethicone 80 milliGRAM(s) Chew three times a day PRN Gas  sodium chloride 0.9% lock flush 10 milliLiter(s) IV Push every 1 hour PRN Pre/post blood products, medications, blood draw, and to maintain line patency            REVIEW OF SYSTEMS:  Constitutional: [ ] fever, [ ]weight loss,  [ ]fatigue [ ]weight gain  Eyes: [ ] visual changes  Respiratory: [ ]shortness of breath;  [ ] cough, [ ]wheezing, [ ]chills, [ ]hemoptysis  Cardiovascular: [ ] chest pain, [ ]palpitations, [ ]dizziness,  [ ]leg swelling[ ]orthopnea[ ]PND  Gastrointestinal: [ ] abdominal pain, [ ]nausea, [ ]vomiting,  [ ]diarrhea [ ]Constipation [ ]Melena  Genitourinary: [ ] dysuria, [ ] hematuria [ ]Vigil  Neurologic: [ ] headaches [ ] tremors[ ]weakness [ ]Paralysis Right[ ] Left[ ]  Skin: [ ] itching, [ ]burning, [ ] rashes  Endocrine: [ ] heat or cold intolerance  Musculoskeletal: [ ] joint pain or swelling; [ ] muscle, back, or extremity pain  Psychiatric: [ ] depression, [ ]anxiety, [ ]mood swings, or [ ]difficulty sleeping  Hematologic: [ ] easy bruising, [ ] bleeding gums    [ ] All remaining systems negative except as per above.   [ ]Unable to obtain.  [x] No change in ROS since admission      Vital Signs Last 24 Hrs  T(C): 36.8 (18 Mar 2025 13:02), Max: 36.8 (18 Mar 2025 13:02)  T(F): 98.2 (18 Mar 2025 13:02), Max: 98.2 (18 Mar 2025 13:02)  HR: 96 (18 Mar 2025 18:00) (67 - 96)  BP: 114/74 (18 Mar 2025 18:00) (107/62 - 119/61)  BP(mean): 87 (18 Mar 2025 18:00) (77 - 87)  RR: 20 (18 Mar 2025 18:00) (20 - 20)  SpO2: 96% (18 Mar 2025 18:00) (96% - 98%)    Parameters below as of 18 Mar 2025 18:00  Patient On (Oxygen Delivery Method): room air      I&O's Summary    17 Mar 2025 07:01  -  18 Mar 2025 07:00  --------------------------------------------------------  IN: 1220 mL / OUT: 2001 mL / NET: -781 mL    18 Mar 2025 07:01  -  18 Mar 2025 22:15  --------------------------------------------------------  IN: 1020 mL / OUT: 0 mL / NET: 1020 mL        PHYSICAL EXAM:  General: No acute distress BMI-  HEENT: EOMI, PERRL  Neck: Supple, [ ] JVD  Lungs: Equal air entry bilaterally; [ ] rales [ ] wheezing [ ] rhonchi  Heart: Regular rate and rhythm; [x ] murmur   2/6 [ x] systolic [ ] diastolic [ ] radiation[ ] rubs [ ]  gallops  Abdomen: Nontender, bowel sounds present  Extremities: No clubbing, cyanosis, [ ] edema [ ]Pulses  equal and intact  Nervous system:  Alert & Oriented X3, no focal deficits  Psychiatric: Normal affect  Skin: No rashes or lesions    LABS:  03-17    140  |  100  |  57[H]  ----------------------------<  87  5.2   |  20[L]  |  7.06[H]    Ca    8.9      17 Mar 2025 14:54  Phos  4.1     03-17  Mg     2.4     03-17    TPro  7.1  /  Alb  2.8[L]  /  TBili  0.4  /  DBili  x   /  AST  15  /  ALT  10  /  AlkPhos  101  03-17    Creatinine Trend: 7.06<--, 5.45<--, 5.55<--, 6.17<--, 5.31<--, 4.92<--                        9.2    10.87 )-----------( 225      ( 17 Mar 2025 14:54 )             30.5     PT/INR - ( 17 Mar 2025 14:54 )   PT: 16.6 sec;   INR: 1.45 ratio         PTT - ( 17 Mar 2025 14:54 )  PTT:37.5 sec

## 2025-03-18 NOTE — PROGRESS NOTE ADULT - ASSESSMENT
This is a 55 y/o male with multiple medical problems including CAD s/p PCI in 2024 s/p CABG x 3 on 24    1: Intubated for hypoxia & left lung white out s/p awake bronch with removal of copious mucopurulent secretions  : s/p IABP insertion, sepsis/septic shock due to E coli   ESBL pneumonia, collapsed Left Lung, s/p multiple bronch    tracheostomy tube placement    VRE E. Faecium Bcx   +HSV PCR BAL   tissue cx from back abscess - Serratia/MRSA  - - intermittent bradycardia/junctional episodes with hypotension  2/3: off , off theophylline. iHD 1L UF  : bronch for Left lung collapse wound vac, 2decho w/ moderate pericardial effusion - similar as before, US abd: small - moderate ascites - monitor at this time.  Wound vac placed for sacral wounds  : iHD-1L UF  : bronch today off positive pressure   : concern for left food drop   2/10 : no acute issues - CXR shows improving left sided aeration, pt subjectively reports having difficulty while lying flat  : s/p Paracentesis 3.5 L removed, leukocytosis   : Ascites fluid PMN < 250 (less likely SBP)   sniff test yesterday showed paradoxical diaphragmatic motion    : mucus plugging but able to clear airway with aggressive pulmonary toileting.   : no vent requirement overnight, able to mobilize secretion with pulm toileting  hyperkalemia post HD - workup for possible recirculation underway   : On TC X 48 hours - ambulating well, no mucus plugging events  : HFTC x24hrs (40L 30%)  : iHD 1.5L UF  : iHD 2L  : trach downsized to 7, bronch  - cultures growing ESBL E Coli, sacral wound vac changed   3/6 Transfer to SDU, +PW isolated, +sacral wound vac in place, + Tube feeds running, next HD scheduled for tomorrow   3/7 VSS; Continue with current medication regimen.  Continue on TC 40% 02.  Plan for HD today.  Nocturnal feeding.  3/8  FEES 3/3 notable for poor secretion management evidenced unable to clear 2/2 weak cough B/l vocal cords appeared to be adducted at midline. ENT eval demonstrates  limited vocal cord abduction vs. questionable vocal cord paresis--no plan for ENT intervention at this time, No PMV . Local sacral wound care by PT.   3/9 HD tomorrow, US abd in am Tolerating pm feeds  3/10 US abd and HD today. CXR PA/LAT. Consider ENT to reassess VC paresis feasibility of decannulation   Disposition: Home PT / OT     3/11  suction prn.  OOB to chair.  Eliquis for a flutter.  ENT note appreciated  Maintain npo, S&S f/u  3/12 The pt is for HD today, plan on trach decannulation after dialysis. Suction x 1 this am for minimal secretions,   3/13 VSS speech and swallow evaluation today  labs with HD shower today  3/14 VSS for FEES today= Passed regular diet   HD today  3/15 needs encouragement for OOB to chair --> educated ALL MEAL to be OOB in chair. encouraged increase ambulation as tolerated. MARIA ELENA shahid Chest PT prn.   3/16 OOB to chair --> tolerating diet; encouraged to increase ambulation as tolerated; pending rpt US Abdomen on Monday for eval of ascites. Labs on HD day, HD planned for Monday per Renal.  3/17  Ambulate.  Tolerating a regular diet.  Abd US d/c as d/w Dr Rivers HD today. D/c planning  Tx to floor   53 y/o male with PMHx of HTN, HLD, ESRD on HD MWF via LUE AVF, ascites (requiring repeat paracenteses q4-6wks), NICM with moderate LV dysfunction w/ EF 35-40%() and CAD s/p atherectomy + SALLIE to D1(2024) presents to [facility] transferred from [facility]. [facility] with shortness of breath. Of note he was recently admitted from 24-24 for acute cholecystitis s/p cholecystectomy. In [facility] ED, pt was hypoxic to 86% on RA, trop 1.7K, EKG no new changes, CXR fluid overload. Admitted for AHRF 2/2 fluid overload. Pt received HD on , ,  and 24. DAPT was resumed, TTE showed improvement in LVEF to 40-45%. Stress test was abnormal with 1mm inferior STD, reversible ischemia in the inferior wall and fixed defects in the lateral, anterior and apical walls with post stress EF of 24%. Pt was then transferred to [facility] for cardiac cath which showed pLAD 70% ISR, mLCx 70%, D1 severe dz. Patient now transferred to [facility] CTS Dr. Rivers for CABG eval. Patient denies any current SOB or chest pain on admission.     24 CABGx3 (LIMA-LAD; SVG-Diag; SVG-leftPL)  1/: Intubated for hypoxia & left lung white out s/p awake bronch with removal of copious mucopurulent secretions  : s/p IABP insertion, sepsis/septic shock due to E coli   ESBL pneumonia, collapsed Left Lung, s/p multiple bronch    tracheostomy tube placement    VRE E. Faecium Bcx   +HSV PCR BAL   tissue cx from back abscess - Serratia/MRSA  - - intermittent bradycardia/junctional episodes with hypotension  2/3: off , off theophylline. iHD 1L UF  : bronch for Left lung collapse wound vac, 2decho w/ moderate pericardial effusion - similar as before, US abd: small - moderate ascites - monitor at this time.  Wound vac placed for sacral wounds  : iHD-1L UF  : bronch today off positive pressure  2/8 : concern for left food drop   2/10 : no acute issues - CXR shows improving left sided aeration, pt subjectively reports having difficulty while lying flat  : s/p Paracentesis 3.5 L removed, leukocytosis   : Ascites fluid PMN < 250 (less likely SBP)   sniff test yesterday showed paradoxical diaphragmatic motion    : mucus plugging but able to clear airway with aggressive pulmonary toileting.   : no vent requirement overnight, able to mobilize secretion with pulm toileting  hyperkalemia post HD - workup for possible recirculation underway   : On TC X 48 hours - ambulating well, no mucus plugging events  : HFTC x24hrs (40L 30%)  : iHD 1.5L UF  : iHD 2L  : trach downsized to 7, bronch  - cultures growing ESBL E Coli, sacral wound vac changed   3/6 Transfer to SDU, +PW isolated, +sacral wound vac in place, + Tube feeds running, next HD scheduled for tomorrow   3/7 VSS; Continue with current medication regimen.  Continue on TC 40% 02.  Plan for HD today.  Nocturnal feeding.  3/8  FEES 3/3 notable for poor secretion management evidenced unable to clear 2/2 weak cough B/l vocal cords appeared to be adducted at midline. ENT eval demonstrates  limited vocal cord abduction vs. questionable vocal cord paresis--no plan for ENT intervention at this time, No PMV . Local sacral wound care by PT.   3/9 HD tomorrow, US abd in am Tolerating pm feeds  3/10 US abd and HD today. CXR PA/LAT. Consider ENT to reassess VC paresis feasibility of decannulation   Disposition: Home PT / OT     3/11  suction prn.  OOB to chair.  Eliquis for a flutter.  ENT note appreciated  Maintain npo, S&S f/u  3/12 The pt is for HD today, plan on trach decannulation after dialysis. Suction x 1 this am for minimal secretions,   3/13 VSS speech and swallow evaluation today  labs with HD shower today  3/14 VSS for FEES today= Passed regular diet   HD today  3/15 needs encouragement for OOB to chair --> educated ALL MEAL to be OOB in chair. encouraged increase ambulation as tolerated. RN Matteo aware Chest PT prn.   3/16 OOB to chair --> tolerating diet; encouraged to increase ambulation as tolerated; pending rpt US Abdomen on Monday for eval of ascites. Labs on HD day, HD planned for Monday per Renal.  3/17  Ambulate.  Tolerating a regular diet.  Abd US d/c as d/w Dr Rivers HD today. D/c planning  Tx to floor  3/18 HD tomorrow.

## 2025-03-18 NOTE — DISCHARGE NOTE NURSING/CASE MANAGEMENT/SOCIAL WORK - NSDCPEFALRISK_GEN_ALL_CORE
For information on Fall & Injury Prevention, visit: [email or web address] OR  [email or web address] OR  https://www.cdc.gov/steadi/patient.html

## 2025-03-18 NOTE — DISCHARGE NOTE NURSING/CASE MANAGEMENT/SOCIAL WORK - NSDCVIVACCINE_GEN_ALL_CORE_FT
Tdap; 14-Dec-2021 19:18; Roxi Lewis (MARIA ELENA); [employer]; [id number] (Exp. Date: 09-Sep-2023); IntraMuscular; Deltoid Right.; 0.5 milliLiter(s); VIS (VIS Published: 09-May-2013, VIS Presented: 14-Dec-2021);

## 2025-03-18 NOTE — DISCHARGE NOTE NURSING/CASE MANAGEMENT/SOCIAL WORK - FINANCIAL ASSISTANCE
[facility] provides services at a reduced cost to those who are determined to be eligible through [facility]’s financial assistance program. Information regarding [facility]’s financial assistance program can be found by going to [email or web address] or by calling [phone].

## 2025-03-19 ENCOUNTER — TRANSCRIPTION ENCOUNTER (OUTPATIENT)
Age: 55
End: 2025-03-19

## 2025-03-19 VITALS
TEMPERATURE: 98 F | RESPIRATION RATE: 18 BRPM | WEIGHT: 156.09 LBS | DIASTOLIC BLOOD PRESSURE: 69 MMHG | OXYGEN SATURATION: 94 % | HEART RATE: 73 BPM | SYSTOLIC BLOOD PRESSURE: 128 MMHG

## 2025-03-19 LAB
ANION GAP SERPL CALC-SCNC: 19 MMOL/L — HIGH (ref 5–17)
BUN SERPL-MCNC: 60 MG/DL — HIGH (ref 7–23)
CALCIUM SERPL-MCNC: 8.9 MG/DL — SIGNIFICANT CHANGE UP (ref 8.4–10.5)
CO2 SERPL-SCNC: 23 MMOL/L — SIGNIFICANT CHANGE UP (ref 22–31)
CREAT SERPL-MCNC: 6.1 MG/DL — HIGH (ref 0.5–1.3)
EGFR: 10 ML/MIN/1.73M2 — LOW
EGFR: 10 ML/MIN/1.73M2 — LOW
GLUCOSE SERPL-MCNC: 132 MG/DL — HIGH (ref 70–99)
HCT VFR BLD CALC: 29.8 % — LOW (ref 39–50)
HGB BLD-MCNC: 9.3 G/DL — LOW (ref 13–17)
MAGNESIUM SERPL-MCNC: 2.2 MG/DL — SIGNIFICANT CHANGE UP (ref 1.6–2.6)
MCHC RBC-ENTMCNC: 30.9 PG — SIGNIFICANT CHANGE UP (ref 27–34)
MCHC RBC-ENTMCNC: 31.2 G/DL — LOW (ref 32–36)
MCV RBC AUTO: 99 FL — SIGNIFICANT CHANGE UP (ref 80–100)
NRBC BLD AUTO-RTO: 0 /100 WBCS — SIGNIFICANT CHANGE UP (ref 0–0)
PLATELET # BLD AUTO: 219 K/UL — SIGNIFICANT CHANGE UP (ref 150–400)
POTASSIUM SERPL-MCNC: 4.9 MMOL/L — SIGNIFICANT CHANGE UP (ref 3.5–5.3)
POTASSIUM SERPL-SCNC: 4.9 MMOL/L — SIGNIFICANT CHANGE UP (ref 3.5–5.3)
RBC # BLD: 3.01 M/UL — LOW (ref 4.2–5.8)
RBC # FLD: 18.4 % — HIGH (ref 10.3–14.5)
SODIUM SERPL-SCNC: 139 MMOL/L — SIGNIFICANT CHANGE UP (ref 135–145)
WBC # BLD: 10.16 K/UL — SIGNIFICANT CHANGE UP (ref 3.8–10.5)
WBC # FLD AUTO: 10.16 K/UL — SIGNIFICANT CHANGE UP (ref 3.8–10.5)

## 2025-03-19 PROCEDURE — 97116 GAIT TRAINING THERAPY: CPT

## 2025-03-19 PROCEDURE — 93308 TTE F-UP OR LMTD: CPT

## 2025-03-19 PROCEDURE — 82746 ASSAY OF FOLIC ACID SERUM: CPT

## 2025-03-19 PROCEDURE — 80074 ACUTE HEPATITIS PANEL: CPT

## 2025-03-19 PROCEDURE — 74018 RADEX ABDOMEN 1 VIEW: CPT

## 2025-03-19 PROCEDURE — 86850 RBC ANTIBODY SCREEN: CPT

## 2025-03-19 PROCEDURE — 87116 MYCOBACTERIA CULTURE: CPT

## 2025-03-19 PROCEDURE — 86022 PLATELET ANTIBODIES: CPT

## 2025-03-19 PROCEDURE — 83010 ASSAY OF HAPTOGLOBIN QUANT: CPT

## 2025-03-19 PROCEDURE — 99024 POSTOP FOLLOW-UP VISIT: CPT

## 2025-03-19 PROCEDURE — 86965 POOLING BLOOD PLATELETS: CPT

## 2025-03-19 PROCEDURE — 97162 PT EVAL MOD COMPLEX 30 MIN: CPT

## 2025-03-19 PROCEDURE — 87581 M.PNEUMON DNA AMP PROBE: CPT

## 2025-03-19 PROCEDURE — 93880 EXTRACRANIAL BILAT STUDY: CPT

## 2025-03-19 PROCEDURE — 87102 FUNGUS ISOLATION CULTURE: CPT

## 2025-03-19 PROCEDURE — 85576 BLOOD PLATELET AGGREGATION: CPT

## 2025-03-19 PROCEDURE — P9045: CPT

## 2025-03-19 PROCEDURE — 84132 ASSAY OF SERUM POTASSIUM: CPT

## 2025-03-19 PROCEDURE — 82945 GLUCOSE OTHER FLUID: CPT

## 2025-03-19 PROCEDURE — 87798 DETECT AGENT NOS DNA AMP: CPT

## 2025-03-19 PROCEDURE — 31502 CHANGE OF WINDPIPE AIRWAY: CPT

## 2025-03-19 PROCEDURE — 87186 SC STD MICRODIL/AGAR DIL: CPT

## 2025-03-19 PROCEDURE — 83615 LACTATE (LD) (LDH) ENZYME: CPT

## 2025-03-19 PROCEDURE — 87451 POLYVALENT MULT ORG EA AG IA: CPT

## 2025-03-19 PROCEDURE — 82607 VITAMIN B-12: CPT

## 2025-03-19 PROCEDURE — 97760 ORTHOTIC MGMT&TRAING 1ST ENC: CPT

## 2025-03-19 PROCEDURE — 94002 VENT MGMT INPAT INIT DAY: CPT

## 2025-03-19 PROCEDURE — 97597 DBRDMT OPN WND 1ST 20 CM/<: CPT

## 2025-03-19 PROCEDURE — 87640 STAPH A DNA AMP PROBE: CPT

## 2025-03-19 PROCEDURE — 82435 ASSAY OF BLOOD CHLORIDE: CPT

## 2025-03-19 PROCEDURE — P9012: CPT

## 2025-03-19 PROCEDURE — P9037: CPT

## 2025-03-19 PROCEDURE — 93971 EXTREMITY STUDY: CPT

## 2025-03-19 PROCEDURE — 83880 ASSAY OF NATRIURETIC PEPTIDE: CPT

## 2025-03-19 PROCEDURE — 83690 ASSAY OF LIPASE: CPT

## 2025-03-19 PROCEDURE — C1751: CPT

## 2025-03-19 PROCEDURE — 71045 X-RAY EXAM CHEST 1 VIEW: CPT | Mod: 26

## 2025-03-19 PROCEDURE — 92523 SPEECH SOUND LANG COMPREHEN: CPT

## 2025-03-19 PROCEDURE — 94799 UNLISTED PULMONARY SVC/PX: CPT

## 2025-03-19 PROCEDURE — 97530 THERAPEUTIC ACTIVITIES: CPT

## 2025-03-19 PROCEDURE — P9016: CPT

## 2025-03-19 PROCEDURE — 93321 DOPPLER ECHO F-UP/LMTD STD: CPT

## 2025-03-19 PROCEDURE — 97164 PT RE-EVAL EST PLAN CARE: CPT

## 2025-03-19 PROCEDURE — 97535 SELF CARE MNGMENT TRAINING: CPT

## 2025-03-19 PROCEDURE — 94660 CPAP INITIATION&MGMT: CPT

## 2025-03-19 PROCEDURE — 0225U NFCT DS DNA&RNA 21 SARSCOV2: CPT

## 2025-03-19 PROCEDURE — 94640 AIRWAY INHALATION TREATMENT: CPT

## 2025-03-19 PROCEDURE — 81001 URINALYSIS AUTO W/SCOPE: CPT

## 2025-03-19 PROCEDURE — 80202 ASSAY OF VANCOMYCIN: CPT

## 2025-03-19 PROCEDURE — C9399: CPT

## 2025-03-19 PROCEDURE — 71045 X-RAY EXAM CHEST 1 VIEW: CPT

## 2025-03-19 PROCEDURE — 87205 SMEAR GRAM STAIN: CPT

## 2025-03-19 PROCEDURE — 86922 COMPATIBILITY TEST ANTIGLOB: CPT

## 2025-03-19 PROCEDURE — 36430 TRANSFUSION BLD/BLD COMPNT: CPT

## 2025-03-19 PROCEDURE — 85384 FIBRINOGEN ACTIVITY: CPT

## 2025-03-19 PROCEDURE — 87040 BLOOD CULTURE FOR BACTERIA: CPT

## 2025-03-19 PROCEDURE — 82947 ASSAY GLUCOSE BLOOD QUANT: CPT

## 2025-03-19 PROCEDURE — 71260 CT THORAX DX C+: CPT | Mod: MC

## 2025-03-19 PROCEDURE — 31720 CLEARANCE OF AIRWAYS: CPT

## 2025-03-19 PROCEDURE — 83605 ASSAY OF LACTIC ACID: CPT

## 2025-03-19 PROCEDURE — 86704 HEP B CORE ANTIBODY TOTAL: CPT

## 2025-03-19 PROCEDURE — 86880 COOMBS TEST DIRECT: CPT

## 2025-03-19 PROCEDURE — 99261: CPT

## 2025-03-19 PROCEDURE — 36415 COLL VENOUS BLD VENIPUNCTURE: CPT

## 2025-03-19 PROCEDURE — 82330 ASSAY OF CALCIUM: CPT

## 2025-03-19 PROCEDURE — 87305 ASPERGILLUS AG IA: CPT

## 2025-03-19 PROCEDURE — 86901 BLOOD TYPING SEROLOGIC RH(D): CPT

## 2025-03-19 PROCEDURE — 84157 ASSAY OF PROTEIN OTHER: CPT

## 2025-03-19 PROCEDURE — 94003 VENT MGMT INPAT SUBQ DAY: CPT

## 2025-03-19 PROCEDURE — 97605 NEG PRS WND THER DME<=50SQCM: CPT

## 2025-03-19 PROCEDURE — 93320 DOPPLER ECHO COMPLETE: CPT

## 2025-03-19 PROCEDURE — 87150 DNA/RNA AMPLIFIED PROBE: CPT

## 2025-03-19 PROCEDURE — 85520 HEPARIN ASSAY: CPT

## 2025-03-19 PROCEDURE — 92612 ENDOSCOPY SWALLOW (FEES) VID: CPT

## 2025-03-19 PROCEDURE — 76705 ECHO EXAM OF ABDOMEN: CPT

## 2025-03-19 PROCEDURE — 85025 COMPLETE CBC W/AUTO DIFF WBC: CPT

## 2025-03-19 PROCEDURE — 97166 OT EVAL MOD COMPLEX 45 MIN: CPT

## 2025-03-19 PROCEDURE — 84295 ASSAY OF SERUM SODIUM: CPT

## 2025-03-19 PROCEDURE — 74177 CT ABD & PELVIS W/CONTRAST: CPT | Mod: MC

## 2025-03-19 PROCEDURE — 87075 CULTR BACTERIA EXCEPT BLOOD: CPT

## 2025-03-19 PROCEDURE — 87641 MR-STAPH DNA AMP PROBE: CPT

## 2025-03-19 PROCEDURE — 82140 ASSAY OF AMMONIA: CPT

## 2025-03-19 PROCEDURE — 93306 TTE W/DOPPLER COMPLETE: CPT

## 2025-03-19 PROCEDURE — 84145 PROCALCITONIN (PCT): CPT

## 2025-03-19 PROCEDURE — 92610 EVALUATE SWALLOWING FUNCTION: CPT

## 2025-03-19 PROCEDURE — 93312 ECHO TRANSESOPHAGEAL: CPT

## 2025-03-19 PROCEDURE — 85027 COMPLETE CBC AUTOMATED: CPT

## 2025-03-19 PROCEDURE — 87255 GENET VIRUS ISOLATE HSV: CPT

## 2025-03-19 PROCEDURE — 87077 CULTURE AEROBIC IDENTIFY: CPT

## 2025-03-19 PROCEDURE — 85018 HEMOGLOBIN: CPT

## 2025-03-19 PROCEDURE — 73090 X-RAY EXAM OF FOREARM: CPT

## 2025-03-19 PROCEDURE — 84100 ASSAY OF PHOSPHORUS: CPT

## 2025-03-19 PROCEDURE — C8924: CPT

## 2025-03-19 PROCEDURE — 82150 ASSAY OF AMYLASE: CPT

## 2025-03-19 PROCEDURE — 97110 THERAPEUTIC EXERCISES: CPT

## 2025-03-19 PROCEDURE — 86891 AUTOLOGOUS BLOOD OP SALVAGE: CPT

## 2025-03-19 PROCEDURE — 93325 DOPPLER ECHO COLOR FLOW MAPG: CPT

## 2025-03-19 PROCEDURE — 93005 ELECTROCARDIOGRAM TRACING: CPT

## 2025-03-19 PROCEDURE — 87015 SPECIMEN INFECT AGNT CONCNTJ: CPT

## 2025-03-19 PROCEDURE — 94010 BREATHING CAPACITY TEST: CPT

## 2025-03-19 PROCEDURE — 92950 HEART/LUNG RESUSCITATION CPR: CPT

## 2025-03-19 PROCEDURE — 85610 PROTHROMBIN TIME: CPT

## 2025-03-19 PROCEDURE — C1889: CPT

## 2025-03-19 PROCEDURE — 86706 HEP B SURFACE ANTIBODY: CPT

## 2025-03-19 PROCEDURE — 87206 SMEAR FLUORESCENT/ACID STAI: CPT

## 2025-03-19 PROCEDURE — 84520 ASSAY OF UREA NITROGEN: CPT

## 2025-03-19 PROCEDURE — 82962 GLUCOSE BLOOD TEST: CPT

## 2025-03-19 PROCEDURE — 93356 MYOCRD STRAIN IMG SPCKL TRCK: CPT

## 2025-03-19 PROCEDURE — 71250 CT THORAX DX C-: CPT | Mod: MC

## 2025-03-19 PROCEDURE — 83735 ASSAY OF MAGNESIUM: CPT

## 2025-03-19 PROCEDURE — P9047: CPT

## 2025-03-19 PROCEDURE — 82803 BLOOD GASES ANY COMBINATION: CPT

## 2025-03-19 PROCEDURE — 87070 CULTURE OTHR SPECIMN AEROBIC: CPT

## 2025-03-19 PROCEDURE — 76604 US EXAM CHEST: CPT

## 2025-03-19 PROCEDURE — 86870 RBC ANTIBODY IDENTIFICATION: CPT

## 2025-03-19 PROCEDURE — 76700 US EXAM ABDOM COMPLETE: CPT

## 2025-03-19 PROCEDURE — 85396 CLOTTING ASSAY WHOLE BLOOD: CPT

## 2025-03-19 PROCEDURE — 87637 SARSCOV2&INF A&B&RSV AMP PRB: CPT

## 2025-03-19 PROCEDURE — 86860 RBC ANTIBODY ELUTION: CPT

## 2025-03-19 PROCEDURE — C1769: CPT

## 2025-03-19 PROCEDURE — 82977 ASSAY OF GGT: CPT

## 2025-03-19 PROCEDURE — 87252 VIRUS INOCULATION TISSUE: CPT

## 2025-03-19 PROCEDURE — P9100: CPT

## 2025-03-19 PROCEDURE — 85730 THROMBOPLASTIN TIME PARTIAL: CPT

## 2025-03-19 PROCEDURE — 83036 HEMOGLOBIN GLYCOSYLATED A1C: CPT

## 2025-03-19 PROCEDURE — 89055 LEUKOCYTE ASSESSMENT FECAL: CPT

## 2025-03-19 PROCEDURE — 84443 ASSAY THYROID STIM HORMONE: CPT

## 2025-03-19 PROCEDURE — 80053 COMPREHEN METABOLIC PANEL: CPT

## 2025-03-19 PROCEDURE — 76000 FLUOROSCOPY <1 HR PHYS/QHP: CPT

## 2025-03-19 PROCEDURE — 87529 HSV DNA AMP PROBE: CPT

## 2025-03-19 PROCEDURE — 93990 DOPPLER FLOW TESTING: CPT

## 2025-03-19 PROCEDURE — 82042 OTHER SOURCE ALBUMIN QUAN EA: CPT

## 2025-03-19 PROCEDURE — 87449 NOS EACH ORGANISM AG IA: CPT

## 2025-03-19 PROCEDURE — 89051 BODY FLUID CELL COUNT: CPT

## 2025-03-19 PROCEDURE — 84439 ASSAY OF FREE THYROXINE: CPT

## 2025-03-19 PROCEDURE — 86923 COMPATIBILITY TEST ELECTRIC: CPT

## 2025-03-19 PROCEDURE — 87340 HEPATITIS B SURFACE AG IA: CPT

## 2025-03-19 PROCEDURE — 86900 BLOOD TYPING SEROLOGIC ABO: CPT

## 2025-03-19 PROCEDURE — 85014 HEMATOCRIT: CPT

## 2025-03-19 PROCEDURE — 87594 PNEUMCYSTS JIROVECII AMP PRB: CPT

## 2025-03-19 PROCEDURE — 80048 BASIC METABOLIC PNL TOTAL CA: CPT

## 2025-03-19 PROCEDURE — 86356 MONONUCLEAR CELL ANTIGEN: CPT

## 2025-03-19 RX ORDER — MIRTAZAPINE 30 MG/1
1 TABLET, FILM COATED ORAL
Qty: 30 | Refills: 0
Start: 2025-03-19 | End: 2025-04-17

## 2025-03-19 RX ORDER — TRAZODONE HCL 100 MG
1 TABLET ORAL
Qty: 30 | Refills: 0
Start: 2025-03-19 | End: 2025-04-17

## 2025-03-19 RX ORDER — METOPROLOL SUCCINATE 50 MG/1
1 TABLET, EXTENDED RELEASE ORAL
Qty: 60 | Refills: 0
Start: 2025-03-19 | End: 2025-04-17

## 2025-03-19 RX ORDER — APIXABAN 2.5 MG/1
1 TABLET, FILM COATED ORAL
Qty: 60 | Refills: 0
Start: 2025-03-19 | End: 2025-04-17

## 2025-03-19 RX ORDER — ASPIRIN 325 MG
1 TABLET ORAL
Qty: 30 | Refills: 0
Start: 2025-03-19 | End: 2025-04-17

## 2025-03-19 RX ORDER — ATORVASTATIN CALCIUM 80 MG/1
1 TABLET, FILM COATED ORAL
Qty: 30 | Refills: 0
Start: 2025-03-19 | End: 2025-04-17

## 2025-03-19 RX ORDER — OXYCODONE HYDROCHLORIDE 30 MG/1
1 TABLET ORAL
Qty: 20 | Refills: 0
Start: 2025-03-19 | End: 2025-03-23

## 2025-03-19 RX ORDER — SODIUM ZIRCONIUM CYCLOSILICATE 5 G/5G
1 POWDER, FOR SUSPENSION ORAL
Qty: 13 | Refills: 0
Start: 2025-03-19 | End: 2025-04-17

## 2025-03-19 RX ORDER — OXYCODONE HYDROCHLORIDE 30 MG/1
5 TABLET ORAL ONCE
Refills: 0 | Status: DISCONTINUED | OUTPATIENT
Start: 2025-03-19 | End: 2025-03-19

## 2025-03-19 RX ADMIN — EPOETIN ALFA 10000 UNIT(S): 10000 SOLUTION INTRAVENOUS; SUBCUTANEOUS at 10:13

## 2025-03-19 RX ADMIN — OXYCODONE HYDROCHLORIDE 5 MILLIGRAM(S): 30 TABLET ORAL at 08:54

## 2025-03-19 RX ADMIN — METHOCARBAMOL 500 MILLIGRAM(S): 500 TABLET, FILM COATED ORAL at 13:18

## 2025-03-19 RX ADMIN — Medication 40 MILLIGRAM(S): at 05:59

## 2025-03-19 RX ADMIN — OXYCODONE HYDROCHLORIDE 5 MILLIGRAM(S): 30 TABLET ORAL at 08:35

## 2025-03-19 RX ADMIN — OXYCODONE HYDROCHLORIDE 5 MILLIGRAM(S): 30 TABLET ORAL at 09:24

## 2025-03-19 RX ADMIN — OXYCODONE HYDROCHLORIDE 10 MILLIGRAM(S): 30 TABLET ORAL at 13:17

## 2025-03-19 RX ADMIN — APIXABAN 2.5 MILLIGRAM(S): 2.5 TABLET, FILM COATED ORAL at 13:18

## 2025-03-19 RX ADMIN — Medication 500 MILLIGRAM(S): at 13:19

## 2025-03-19 RX ADMIN — METOPROLOL SUCCINATE 25 MILLIGRAM(S): 50 TABLET, EXTENDED RELEASE ORAL at 05:59

## 2025-03-19 RX ADMIN — Medication 81 MILLIGRAM(S): at 13:19

## 2025-03-19 RX ADMIN — OXYCODONE HYDROCHLORIDE 10 MILLIGRAM(S): 30 TABLET ORAL at 13:47

## 2025-03-19 RX ADMIN — METHOCARBAMOL 500 MILLIGRAM(S): 500 TABLET, FILM COATED ORAL at 05:58

## 2025-03-19 RX ADMIN — OXYCODONE HYDROCHLORIDE 5 MILLIGRAM(S): 30 TABLET ORAL at 09:05

## 2025-03-19 NOTE — PROGRESS NOTE ADULT - ASSESSMENT
54M with PMH of HTN, HLD, ESRD on HD MWF via LUE AVF, ascites (requiring repeat paracenteses q4-6wks), NICM with moderate LV dysfunction w/ EF 35-40%() and CAD s/p atherectomy + SALLIE to D1(2024) presents to [facility] transferred from [facility].  Cardiac cath which showed pLAD 70% ISR, mLCx 70%, D1 severe dz.   Patient now transferred to [facility] CTS Dr. Rivers for CABG eval      # CAD s/p NSTEMI  Hx CAD s/p PCI LAD   Presented with ADHF elevated troponins  Positive NST1-Cath- pLAD 70% ISR, mLCx 70%, D1 severe dz.   TTE EF 35% Severe TRWas on Plavix-p2y12- 321 been off Plavix since -POD#1-C3L free LIMA-LAD; SVG-Diag; OPK-rwgsCF-Obfzwryzz on  Sinus rhythm,Amio for AF prophylaxis   Atrial Flutter on TC during day Ambulating  -Increased congestion on CXR had HD yesterday remains in AFl   -Atrial Flutter HFTC 40L/30% BP stable     -Atrial Flutter tolerating TC for HD MWF-consider DOAC   OOB Abd US moderate 4 quad ascites noted remains in Atrial Flutter~~70's   TC Atrial Flutter  -s/p Bronch/BAL remains in AFl  Trach downsized 7   -Tolerating TC AFl rate controlled On Heparin gtt transition to DOAC  -Tx to SDU on Eliquis Atrial Flutter  -VSS Atrial Flutter rate controlled on Eliquis for AC  -Atrial Flutter ENT follow up Taper O2 requirement on TC 30%  -Awake no dyspnea on TC  -Trach tube removed still unable PO  03/15-Passed FEES -Regular diet    -Tolerating diet no dyspnes      # Acute on Chronic Systolic Heart Failure now improved  EF-35% ,severe TR  Had HD post op  GDMT post op  LVEF improved post bypass   -TTE EF 40%,trace effusion  ECG c/w pericarditis-was on colchicine   01/15-TTE EF 55%  -TTE EF 60%   -Normal LV systolic function Moderate pericardial effusion  -On TC-HF and Vent support at nights   02/10-Vent HS with T Collar trial Ambulated Remains in AF  Repeat TTE Normal LV Systolic function Small Pericardial effusion decreased from 2/3        Vascular evaluated  AVGraft /?steal causing RV failure-'' Very low suspicion of steal Sd and AVF causing current clinical state. ''   VA Duplex Hemodialysis Access, Left (25 @ 13:35) >   Arterial flow volume of 1301 mL/m, and the venous flow volume of  1492 mL/m are consistent with a normally functioning dialysis access  fistula.      # Ascites  Hx chronic ascites  Has drainage q4-6 weeks  Last drained -4600mL  ? ascites related to severe TR  Had djqkndtbrbcx-Mqxtcdz-tv growth   CT Abdomen 01/10-Large volume ascites-  US abdomen--Small to moderate volume abdominal/pelvic ascites  US abdomen  Moderate ascites  US Abdomen -Moderate 4 quad ascites  US Abdomen Limited 03/10-Moderate ascites throughout abdomen, with large pelvic ascites.      # ESRD  Now on  HD-MWF      PLAN ON DISCHARGE HOME

## 2025-03-19 NOTE — DISCHARGE NOTE PROVIDER - CARE PROVIDER_API CALL
Karan Rivers  Thoracic and Cardiac Surgery  [address] [place] [address]  Phone: [phone]  Fax: [phone]  Follow Up Time:    Karan Rivers  Thoracic and Cardiac Surgery  [address] [place] [id number]  Phone: [phone]  Fax: [phone]  Follow Up Time:     Solo Quick  Cardiology  [address] [place] [address]  Phone: [phone]  Fax: [phone]  Follow Up Time:

## 2025-03-19 NOTE — PROGRESS NOTE ADULT - ASSESSMENT
54M with PMH of HTN, HLD, ESRD on HD MWF via LUE AVF, ascites (requiring repeat paracenteses q4-6wks), NICM with moderate LV dysfunction w/ EF 35-40%(2023) and CAD s/p atherectomy + SALLIE to D1(4/11/2024) presents to [facility] transferred from [facility] for CABG eval  Nephro on board for HD needs.    ESRD on HD   Nephrologist Dr. Bailey  [facility] Schedule / Access:  LUE AVF  Center: [facility]  Pt s/p HD on 2/19 and 2/21.  s/p HD 2/24 uf 1.5L  S/p HD on 02/26 2 L UF and 02/28 2 L UF again   S/p HD on 03/03 and 03/5, 03/07  S/p HD on 03/10 AND 03/12  S/p HD on 03/14 1.5 L removed and on 03/17  HD today per scheduele  Keep MAP>65  Renal Diet  HD consent in chart   Pending labs since 03/10    Hyper/Hypokalemia  Pt intermittently Hyperkalemia  HD as scheduled  now improved  Low K diet.  C/W Lokelma 10gm on nonHD days.  Monitor K.    HTN  BP fluctuating; controlled.  UF w/ HD as BP permits.  Low sodium diet.  Management per CTICU  Monitor BP     CKD MBD  Check PTH   Low PO4 diet.  Not on binder.  Monitor Ca , PO4 daily     Anemia  CRISTIANE with HD  Repeat iron studies.  Transfuse if hgb <7    CAD with multivessel disease  CTICU following  s/p CABG 12/30    Hypoxic respiratory failure   S/p tracheostomy 01/18 and decannulated on 03/11  Per surgery  Pulm following

## 2025-03-19 NOTE — DISCHARGE NOTE PROVIDER - DETAILS OF MALNUTRITION DIAGNOSIS/DIAGNOSES
This patient has been assessed with a concern for Malnutrition and was treated during this hospitalization for the following Nutrition diagnosis/diagnoses:     -  03/11/2025: Moderate protein-calorie malnutrition

## 2025-03-19 NOTE — PROGRESS NOTE ADULT - PROBLEM SELECTOR PROBLEM 5
Decubitus ulcer

## 2025-03-19 NOTE — DISCHARGE NOTE PROVIDER - NSDCCPCAREPLAN_GEN_ALL_CORE_FT
PRINCIPAL DISCHARGE DIAGNOSIS  Diagnosis: S/P CABG x 3  Assessment and Plan of Treatment: shower daily  weigh yourself daily  continue current prescriptions as ordered  increase activity as tolerated   no added salt; low fat; low cholesterol, low salt renal diet- no concentrated potasssium   dialysis every mon, wed and friday  vac changes to sacrum every mon, wed and fri   follow up with Cardiologist in 1-2 weeks. Call to schedule appointment.  follow up with cardiac surgeon, Dr. Rivers, on March 26th at 3:15 pm; CAll to confirm appointment. [phone]       PRINCIPAL DISCHARGE DIAGNOSIS  Diagnosis: S/P CABG x 3  Assessment and Plan of Treatment: shower daily  weigh yourself daily  continue current prescriptions as ordered  increase activity as tolerated   no added salt; low fat; low cholesterol, low salt renal diet- no concentrated potasssium   dialysis every mon, wed and friday  vac changes to sacrum every mon, wed and fri   follow up with Cardiologist, Dr. Quick, in 1-2 weeks. Call to schedule appointment.  follow up with cardiac surgeon, Dr. Rivers, on March 26th at 3:15 pm; CAll to confirm appointment. [phone]

## 2025-03-19 NOTE — PROGRESS NOTE ADULT - PROBLEM SELECTOR PROBLEM 4
Ascites

## 2025-03-19 NOTE — PROGRESS NOTE ADULT - ASSESSMENT
53 y/o male with PMHx of HTN, HLD, ESRD on HD MWF via LUE AVF, ascites (requiring repeat paracenteses q4-6wks), NICM with moderate LV dysfunction w/ EF 35-40%() and CAD s/p atherectomy + SALLIE to D1(2024) presents to [facility] transferred from [facility]. Patient initially presented to [facility] ED with shortness of breath. Of note he was recently admitted from 24-24 for acute cholecystitis s/p cholecystectomy. In [facility] ED, pt was hypoxic to 86% on RA, trop 1.7K, EKG no new changes, CXR fluid overload. Admitted for AHRF 2/2 fluid overload. Pt received HD on , ,  and 24. DAPT was resumed, TTE showed improvement in LVEF to 40-45%. Stress test was abnormal with 1mm inferior STD, reversible ischemia in the inferior wall and fixed defects in the lateral, anterior and apical walls with post stress EF of 24%. Pt was then transferred to [facility] for cardiac cath which showed pLAD 70% ISR, mLCx 70%, D1 severe dz. Patient now transferred to [facility] CTS Dr. Rivers for CABG eval. Patient denies any current SOB or chest pain on admission.     24 CABGx3 (LIMA-LAD; SVG-Diag; SVG-leftPL)  1/: Intubated for hypoxia & left lung white out s/p awake bronch with removal of copious mucopurulent secretions  : s/p IABP insertion, sepsis/septic shock due to E coli   ESBL pneumonia, collapsed Left Lung, s/p multiple bronch    tracheostomy tube placement    VRE E. Faecium Bcx   +HSV PCR BAL   tissue cx from back abscess - Serratia/MRSA  - - intermittent bradycardia/junctional episodes with hypotension  2/3: off , off theophylline. iHD 1L UF  : bronch for Left lung collapse wound vac, 2decho w/ moderate pericardial effusion - similar as before, US abd: small - moderate ascites - monitor at this time.  Wound vac placed for sacral wounds  : iHD-1L UF  : bronch today off positive pressure  2/8 : concern for left food drop   2/10 : no acute issues - CXR shows improving left sided aeration, pt subjectively reports having difficulty while lying flat  : s/p Paracentesis 3.5 L removed, leukocytosis   : Ascites fluid PMN < 250 (less likely SBP)   sniff test yesterday showed paradoxical diaphragmatic motion    : mucus plugging but able to clear airway with aggressive pulmonary toileting.   : no vent requirement overnight, able to mobilize secretion with pulm toileting  hyperkalemia post HD - workup for possible recirculation underway   : On TC X 48 hours - ambulating well, no mucus plugging events  : HFTC x24hrs (40L 30%)  : iHD 1.5L UF  : iHD 2L  : trach downsized to 7, bronch  - cultures growing ESBL E Coli, sacral wound vac changed   3/6 Transfer to SDU, +PW isolated, +sacral wound vac in place, + Tube feeds running, next HD scheduled for tomorrow   3/7 VSS; Continue with current medication regimen.  Continue on TC 40% 02.  Plan for HD today.  Nocturnal feeding.  3/8  FEES 3/3 notable for poor secretion management evidenced unable to clear 2/2 weak cough B/l vocal cords appeared to be adducted at midline. ENT eval demonstrates  limited vocal cord abduction vs. questionable vocal cord paresis--no plan for ENT intervention at this time, No PMV . Local sacral wound care by PT.   3/9 HD tomorrow, US abd in am Tolerating pm feeds  3/10 US abd and HD today. CXR PA/LAT. Consider ENT to reassess VC paresis feasibility of decannulation   Disposition: Home PT / OT     3/11  suction prn.  OOB to chair.  Eliquis for a flutter.  ENT note appreciated  Maintain npo, S&S f/u  3/12 The pt is for HD today, plan on trach decannulation after dialysis. Suction x 1 this am for minimal secretions,   3/13 VSS speech and swallow evaluation today  labs with HD shower today  3/14 VSS for FEES today= Passed regular diet   HD today  3/15 needs encouragement for OOB to chair --> educated ALL MEAL to be OOB in chair. encouraged increase ambulation as tolerated. RN Matteo aware Chest PT prn.   3/16 OOB to chair --> tolerating diet; encouraged to increase ambulation as tolerated; pending rpt US Abdomen on Monday for eval of ascites. Labs on HD day, HD planned for Monday per Renal.  3/17  Ambulate.  Tolerating a regular diet.  Abd US d/c as d/w Dr Rivers HD today. D/c planning  Tx to floor  3/18 HD tomorrow.

## 2025-03-19 NOTE — PROGRESS NOTE ADULT - PROBLEM SELECTOR PLAN 4
Abdominal US on Mondays?  f/u
Abdominal US on Mondays
Abdominal US on Mondays  f/u
Abdominal US on Mondays  f/u
Abdominal US on Mondays?  f/u
Abdominal US on Mondays
Abdominal US on Mondays
Abdominal US on Mondays  f/u
Abdominal US on Mondays  f/u
Abdominal US on Mondays
Abdominal US on Mondays
Abdominal US on Mondays  f/u
Abdominal US on Mondays  f/u
Abdominal US on Mondays?  f/u
Abdominal US on Mondays  f/u
Abdominal US on Mondays  f/u
Abdominal US on Mondays
Abdominal US on Mondays  f/u

## 2025-03-19 NOTE — PROGRESS NOTE ADULT - PA/NP ONLY VISIT
PA/NP only visit

## 2025-03-19 NOTE — DISCHARGE NOTE PROVIDER - PROVIDER TOKENS
PROVIDER:[TOKEN:[[id number]]] PROVIDER:[TOKEN:[[id number]]],PROVIDER:[TOKEN:[[id number]]]

## 2025-03-19 NOTE — PROGRESS NOTE ADULT - PROBLEM SELECTOR PROBLEM 3
Diabetes

## 2025-03-19 NOTE — PROGRESS NOTE ADULT - SUBJECTIVE AND OBJECTIVE BOX
[facility]    Dr. Nica Styles     Office [phone] (9 am to 5 pm)  Service: [phone] (5pm to 9am)  Also Available on TEAMS      RENAL PROGRESS NOTE: DATE OF SERVICE 03-19-25 @ 11:49    Patient is a 54y old  Male who presents with a chief complaint of CAD s/p CABG (19 Mar 2025 05:56)      Patient seen and examined at bedside. No chest pain/sob    VITALS:  T(F): 98.2 (03-19-25 @ 06:01), Max: 98.2 (03-18-25 @ 13:02)  HR: 92 (03-19-25 @ 06:01)  BP: 116/60 (03-19-25 @ 06:01)  RR: 18 (03-19-25 @ 06:01)  SpO2: 92% (03-19-25 @ 06:01)  Wt(kg): --    03-18 @ 07:01  -  03-19 @ 07:00  --------------------------------------------------------  IN: 1140 mL / OUT: 0 mL / NET: 1140 mL          PHYSICAL EXAM:  Constitutional: NAD  Neck: No JVD  Respiratory: CTAB, no wheezes, rales or rhonchi  Cardiovascular: S1, S2, RRR  Gastrointestinal: BS+, soft, NT/ND  Extremities: No peripheral edema    Hospital Medications:   MEDICATIONS  (STANDING):  apixaban 2.5 milliGRAM(s) Oral <User Schedule>  ascorbic acid 500 milliGRAM(s) Oral daily  aspirin  chewable 81 milliGRAM(s) Oral daily  atorvastatin 80 milliGRAM(s) Oral at bedtime  buDESOnide    Inhalation Suspension 0.5 milliGRAM(s) Inhalation two times a day  chlorhexidine 4% Liquid 1 Application(s) Topical <User Schedule>  dextrose 50% Injectable 25 Gram(s) IV Push once  dextrose 50% Injectable 50 milliLiter(s) IV Push every 15 minutes  epoetin ignacia (EPOGEN) Injectable 42046 Unit(s) IV Push <User Schedule>  ferrous    sulfate Liquid 300 milliGRAM(s) Enteral Tube daily  insulin lispro (ADMELOG) corrective regimen sliding scale   SubCutaneous three times a day before meals  insulin lispro (ADMELOG) corrective regimen sliding scale   SubCutaneous at bedtime  melatonin 5 milliGRAM(s) Oral at bedtime  methocarbamol 500 milliGRAM(s) Oral every 8 hours  metoprolol tartrate 25 milliGRAM(s) Oral two times a day  mirtazapine 7.5 milliGRAM(s) Oral at bedtime  pantoprazole    Tablet 40 milliGRAM(s) Oral before breakfast  sodium chloride 0.65% Nasal 1 Spray(s) Both Nostrils every 12 hours  sodium chloride 0.9% for Nebulization 3 milliLiter(s) Nebulizer every 6 hours  sodium zirconium cyclosilicate 10 Gram(s) Oral <User Schedule>  traZODone 100 milliGRAM(s) Oral at bedtime      LABS:  03-19    139  |  97  |  60[H]  ----------------------------<  132[H]  4.9   |  23  |  6.10[H]    Ca    8.9      19 Mar 2025 10:01  Phos  4.1     03-17  Mg     2.2     03-19    TPro  7.1  /  Alb  2.8[L]  /  TBili  0.4  /  DBili      /  AST  15  /  ALT  10  /  AlkPhos  101  03-17    Creatinine Trend: 6.10 <--, 7.06 <--, 5.45 <--                                9.3    10.16 )-----------( 219      ( 19 Mar 2025 10:01 )             29.8     Urine Studies:  Urinalysis - [03-19-25 @ 10:01]      Color  / Appearance  / SG  / pH       Gluc 132 / Ketone   / Bili  / Urobili        Blood  / Protein  / Leuk Est  / Nitrite       RBC  / WBC  / Hyaline  / Gran  / Sq Epi  / Non Sq Epi  / Bacteria       Iron 29, TIBC 143, %sat 20      [12-19-24 @ 05:00]  Ferritin 904      [12-19-24 @ 05:00]  TSH 4.75      [12-24-24 @ 06:50]    HBsAg Nonreact      [03-14-25 @ 15:50]  HBcAb Nonreact      [03-14-25 @ 15:50]  HCV 0.46, Nonreact      [03-14-25 @ 15:50]      RADIOLOGY & ADDITIONAL STUDIES:

## 2025-03-19 NOTE — PROGRESS NOTE ADULT - TIME-BASED
55
55
56
56
60
65
65
55
55
60
65
60
55
56
60
65
55
60
55
60
40
55
56
56
60
65
55
50
56

## 2025-03-19 NOTE — DISCHARGE NOTE PROVIDER - NSDCMRMEDTOKEN_GEN_ALL_CORE_FT
apixaban 2.5 mg oral tablet: 1 tab(s) orally 2 times a day  aspirin 81 mg oral tablet, chewable: 1 tab(s) orally once a day  atorvastatin 80 mg oral tablet: 1 tab(s) orally once a day (at bedtime)  metoprolol tartrate 25 mg oral tablet: 1 tab(s) orally 2 times a day  mirtazapine 7.5 mg oral tablet: 1 tab(s) orally once a day (at bedtime)  oxyCODONE 5 mg oral tablet: 1 tab(s) orally every 6 hours as needed for  moderate pain MDD: 4  pantoprazole 40 mg oral delayed release tablet: 1 tab(s) orally once a day (before a meal)  Emani-Lisette oral tablet: 1 tab(s) orally once a day  traZODone 100 mg oral tablet: 1 tab(s) orally once a day (at bedtime)  Tylenol 325 mg oral tablet: 2 tab(s) orally every 6 hours as needed for  mild pain   apixaban 2.5 mg oral tablet: 1 tab(s) orally 2 times a day  aspirin 81 mg oral tablet, chewable: 1 tab(s) orally once a day  atorvastatin 80 mg oral tablet: 1 tab(s) orally once a day (at bedtime)  Lokelma 10 g oral powder for reconstitution: 1 packet(s) orally 3 times a week take on tuesdays, thursdays and saturdays  metoprolol tartrate 25 mg oral tablet: 1 tab(s) orally 2 times a day  mirtazapine 7.5 mg oral tablet: 1 tab(s) orally once a day (at bedtime)  oxyCODONE 5 mg oral tablet: 1 tab(s) orally every 6 hours as needed for  moderate pain MDD: 4  pantoprazole 40 mg oral delayed release tablet: 1 tab(s) orally once a day (before a meal)  Emani-Lisette oral tablet: 1 tab(s) orally once a day  traZODone 100 mg oral tablet: 1 tab(s) orally once a day (at bedtime)  Tylenol 325 mg oral tablet: 2 tab(s) orally every 6 hours as needed for  mild pain

## 2025-03-19 NOTE — PROGRESS NOTE ADULT - SUBJECTIVE AND OBJECTIVE BOX
VITAL SIGNS    Telemetry:    Vital Signs Last 24 Hrs  T(C): 36.8 (03-19-25 @ 06:01), Max: 36.8 (03-18-25 @ 13:02)  T(F): 98.2 (03-19-25 @ 06:01), Max: 98.2 (03-18-25 @ 13:02)  HR: 92 (03-19-25 @ 06:01) (68 - 96)  BP: 116/60 (03-19-25 @ 06:01) (107/62 - 116/60)  RR: 18 (03-19-25 @ 06:01) (18 - 20)  SpO2: 92% (03-19-25 @ 06:01) (92% - 96%)            03-18 @ 07:01  -  03-19 @ 07:00  --------------------------------------------------------  IN: 1140 mL / OUT: 0 mL / NET: 1140 mL       Daily     Daily   Admit Wt: Drug Dosing Weight  Height (cm): 180.3 (30 Dec 2024 12:01)  Weight (kg): 85.1 (30 Dec 2024 12:01)  BMI (kg/m2): 26.2 (30 Dec 2024 12:01)  BSA (m2): 2.05 (30 Dec 2024 12:01)      CAPILLARY BLOOD GLUCOSE      POCT Blood Glucose.: 153 mg/dL (18 Mar 2025 16:30)  POCT Blood Glucose.: 128 mg/dL (18 Mar 2025 11:19)          LABS:    03-18 @ 07:01  -  03-19 @ 07:00  --------------------------------------------------------  IN: 1140 mL / OUT: 0 mL / NET: 1140 mL    cret                        9.2    10.87 )-----------( 225      ( 17 Mar 2025 14:54 )             30.5     03-17    140  |  100  |  57[H]  ----------------------------<  87  5.2   |  20[L]  |  7.06[H]    Ca    8.9      17 Mar 2025 14:54  Phos  4.1     03-17  Mg     2.4     03-17    TPro  7.1  /  Alb  2.8[L]  /  TBili  0.4  /  DBili  x   /  AST  15  /  ALT  10  /  AlkPhos  101  03-17    PT/INR - ( 17 Mar 2025 14:54 )   PT: 16.6 sec;   INR: 1.45 ratio         PTT - ( 17 Mar 2025 14:54 )  PTT:37.5 sec    acetaminophen     Tablet .. 650 milliGRAM(s) Oral every 6 hours PRN  apixaban 2.5 milliGRAM(s) Oral <User Schedule>  ascorbic acid 500 milliGRAM(s) Oral daily  aspirin  chewable 81 milliGRAM(s) Oral daily  atorvastatin 80 milliGRAM(s) Oral at bedtime  benzocaine/menthol Lozenge 1 Lozenge Oral every 4 hours PRN  buDESOnide    Inhalation Suspension 0.5 milliGRAM(s) Inhalation two times a day  chlorhexidine 4% Liquid 1 Application(s) Topical <User Schedule>  dextrose 50% Injectable 50 milliLiter(s) IV Push every 15 minutes  dextrose 50% Injectable 25 Gram(s) IV Push once  epoetin ignacia (EPOGEN) Injectable 75569 Unit(s) IV Push <User Schedule>  ferrous    sulfate Liquid 300 milliGRAM(s) Enteral Tube daily  insulin lispro (ADMELOG) corrective regimen sliding scale   SubCutaneous at bedtime  insulin lispro (ADMELOG) corrective regimen sliding scale   SubCutaneous three times a day before meals  lidocaine/prilocaine Cream 1 Application(s) Topical daily PRN  melatonin 5 milliGRAM(s) Oral at bedtime  methocarbamol 500 milliGRAM(s) Oral every 8 hours  metoprolol tartrate 25 milliGRAM(s) Oral two times a day  mirtazapine 7.5 milliGRAM(s) Oral at bedtime  oxyCODONE    IR 5 milliGRAM(s) Oral every 4 hours PRN  oxyCODONE    IR 10 milliGRAM(s) Oral every 6 hours PRN  pantoprazole    Tablet 40 milliGRAM(s) Oral before breakfast  simethicone 80 milliGRAM(s) Chew three times a day PRN  sodium chloride 0.65% Nasal 1 Spray(s) Both Nostrils every 12 hours  sodium chloride 0.9% for Nebulization 3 milliLiter(s) Nebulizer every 6 hours  sodium chloride 0.9% lock flush 10 milliLiter(s) IV Push every 1 hour PRN  sodium zirconium cyclosilicate 10 Gram(s) Oral <User Schedule>  traZODone 100 milliGRAM(s) Oral at bedtime      PHYSICAL EXAM    Subjective: "Hi.   Neurology: alert and oriented x 3, nonfocal, no gross deficits  CV : tele:  RSR  Sternal Wound :  CDI with dressing , Stable  Lungs: clear. RR easy, unlabored   Abdomen: soft, nontender, nondistended, positive bowel sounds, bowel movement   Neg N/V/D   :  pt voiding without difficulty   Extremities:   ANAYA; edema, neg calf tenderness.   PPP bilaterally      PW:  Chest tubes:

## 2025-03-19 NOTE — PROGRESS NOTE ADULT - TIME BILLING
- Review of records, telemetry, vital signs and daily labs.   - General and cardiovascular physical examination.  - Generation of cardiovascular treatment plan and completion of note .  - Coordination of care.      Patient was seen and examined by me on  01/02/2025 ,interim events noted,labs and radiology studies reviewed.  Solo Quick MD,FACC.  [address] [place], [place] [id number].  [phone]  Availabe to call or text on Microsoft TEAMS.
- Review of records, telemetry, vital signs and daily labs.   - General and cardiovascular physical examination.  - Generation of cardiovascular treatment plan and completion of note .  - Coordination of care.      Patient was seen and examined by me on  01/10/2025 ,interim events noted,labs and radiology studies reviewed.  Solo Quick MD,FACC.  2557 [facility]85514.  [phone]  Availabe to call or text on Microsoft TEAMS.
- Review of records, telemetry, vital signs and daily labs.   - General and cardiovascular physical examination.  - Generation of cardiovascular treatment plan and completion of note .  - Coordination of care.      Patient was seen and examined by me on  02/10/2025 ,interim events noted,labs and radiology studies reviewed.  Solo Quick MD,FACC.  7432 [facility]99337.  [phone]  Availabe to call or text on Microsoft TEAMS.
- Review of records, telemetry, vital signs and daily labs.   - General and cardiovascular physical examination.  - Generation of cardiovascular treatment plan and completion of note .  - Coordination of care.      Patient was seen and examined by me on  02/16/2025 ,interim events noted,labs and radiology studies reviewed.  Solo Quick MD,FACC.  [address] [place], [place] [id number].  [phone]  Availabe to call or text on Microsoft TEAMS.
- Review of records, telemetry, vital signs and daily labs.   - General and cardiovascular physical examination.  - Generation of cardiovascular treatment plan and completion of note .  - Coordination of care.      Patient was seen and examined by me on  02/17/2025 ,interim events noted,labs and radiology studies reviewed.  Solo Quick MD,FACC.  [address] [place], [place] [id number].  [phone]  Availabe to call or text on Microsoft TEAMS.
- Review of records, telemetry, vital signs and daily labs.   - General and cardiovascular physical examination.  - Generation of cardiovascular treatment plan and completion of note .  - Coordination of care.      Patient was seen and examined by me on  02/26/2025 ,interim events noted,labs and radiology studies reviewed.  Solo Quick MD,FACC.  6985 [facility]17065.  [phone]  Availabe to call or text on Microsoft TEAMS.
- Review of records, telemetry, vital signs and daily labs.   - General and cardiovascular physical examination.  - Generation of cardiovascular treatment plan and completion of note .  - Coordination of care.      Patient was seen and examined by me on  03/03/2025 ,interim events noted,labs and radiology studies reviewed.  Solo Quick MD,FACC.  [address] [place], [place] [id number].  [phone]  Availabe to call or text on Microsoft TEAMS.
- Review of records, telemetry, vital signs and daily labs.   - General and cardiovascular physical examination.  - Generation of cardiovascular treatment plan and completion of note .  - Coordination of care.      Patient was seen and examined by me on  12/27/2024 ,interim events noted,labs and radiology studies reviewed.  Solo Quick MD,FACC.  0905 [facility]60445.  [phone]  Availabe to call or text on Microsoft TEAMS.
- Review of records, telemetry, vital signs and daily labs.   - General and cardiovascular physical examination.  - Generation of cardiovascular treatment plan and completion of note .  - Coordination of care.      Patient was seen and examined by me on  12/29/2024 ,interim events noted,labs and radiology studies reviewed.  Solo Quick MD,FACC.  8515 [facility]65181.  [phone]  Availabe to call or text on Microsoft TEAMS.
- Review of records, telemetry, vital signs and daily labs.   - General and cardiovascular physical examination.  - Generation of cardiovascular treatment plan and completion of note .  - Coordination of care.      Patient was seen and examined by me on 01/15/2025 ,interim events noted,labs and radiology studies reviewed.  Solo Quick MD,FACC.  [address] [place], [place] [id number].  [phone]  Availabe to call or text on Microsoft TEAMS.
- Review of records, telemetry, vital signs and daily labs.   - General and cardiovascular physical examination.  - Generation of cardiovascular treatment plan and completion of note .  - Coordination of care.      Patient was seen and examined by me on 01/19/2025,interim events noted,labs and radiology studies reviewed.  Solo Quick MD,FACC.  2824 [facility]08718.  [phone]  Availabe to call or text on Microsoft TEAMS.
- Review of records, telemetry, vital signs and daily labs.   - General and cardiovascular physical examination.  - Generation of cardiovascular treatment plan and completion of note .  - Coordination of care.      Patient was seen and examined by me on 03/08/2025 ,interim events noted,labs and radiology studies reviewed.  Solo Quick MD,FACC.  [address] [place], [place] [id number].  [phone]  Availabe to call or text on Microsoft TEAMS.
- Review of records, telemetry, vital signs and daily labs.   - General and cardiovascular physical examination.  - Generation of cardiovascular treatment plan and completion of note .  - Coordination of care.      Patient was seen and examined by me on 03/10/2025 ,interim events noted,labs and radiology studies reviewed.  Solo Quick MD,FACC.  [address] [place], [place] [id number].  [phone]  Availabe to call or text on Microsoft TEAMS.
- Review of records, telemetry, vital signs and daily labs.   - General and cardiovascular physical examination.  - Generation of cardiovascular treatment plan and completion of note .  - Coordination of care.      Patient was seen and examined by me on  01/01/2025 ,interim events noted,labs and radiology studies reviewed.  Solo Quick MD,FACC.  9797 [facility]03133.  [phone]  Availabe to call or text on Microsoft TEAMS.
- Review of records, telemetry, vital signs and daily labs.   - General and cardiovascular physical examination.  - Generation of cardiovascular treatment plan and completion of note .  - Coordination of care.      Patient was seen and examined by me on  01/11/2025 ,interim events noted,labs and radiology studies reviewed.  Solo Quick MD,FACC.  [address] [place], [place] [id number].  [phone]  Availabe to call or text on Microsoft TEAMS.
- Review of records, telemetry, vital signs and daily labs.   - General and cardiovascular physical examination.  - Generation of cardiovascular treatment plan and completion of note .  - Coordination of care.      Patient was seen and examined by me on  01/28/2025 ,interim events noted,labs and radiology studies reviewed.  Solo Quick MD,FACC.  2077 [facility]01855.  [phone]  Availabe to call or text on Microsoft TEAMS.
- Review of records, telemetry, vital signs and daily labs.   - General and cardiovascular physical examination.  - Generation of cardiovascular treatment plan and completion of note .  - Coordination of care.      Patient was seen and examined by me on  01/29/2025 ,interim events noted,labs and radiology studies reviewed.  Solo Quick MD,FACC.  4393 [facility]35321.  [phone]  Availabe to call or text on Microsoft TEAMS.
- Review of records, telemetry, vital signs and daily labs.   - General and cardiovascular physical examination.  - Generation of cardiovascular treatment plan and completion of note .  - Coordination of care.      Patient was seen and examined by me on  02/09/2025 ,interim events noted,labs and radiology studies reviewed.  Solo Quick MD,FACC.  [address] [place], [place] [id number].  [phone]  Availabe to call or text on Microsoft TEAMS.
- Review of records, telemetry, vital signs and daily labs.   - General and cardiovascular physical examination.  - Generation of cardiovascular treatment plan and completion of note .  - Coordination of care.      Patient was seen and examined by me on  02/19/2025 ,interim events noted,labs and radiology studies reviewed.  Solo Quick MD,FACC.  [address] [place], [place] [id number].  [phone]  Availabe to call or text on Microsoft TEAMS.
- Review of records, telemetry, vital signs and daily labs.   - General and cardiovascular physical examination.  - Generation of cardiovascular treatment plan and completion of note .  - Coordination of care.      Patient was seen and examined by me on 01/06/2024 ,interim events noted,labs and radiology studies reviewed.  Solo Quick MD,FACC.  [address] [place], [place] [id number].  [phone]  Availabe to call or text on Microsoft TEAMS.
- Review of records, telemetry, vital signs and daily labs.   - General and cardiovascular physical examination.  - Generation of cardiovascular treatment plan and completion of note .  - Coordination of care.      Patient was seen and examined by me on 01/17/2025,interim events noted,labs and radiology studies reviewed.  Solo Quick MD,FACC.  [address] [place], [place] [id number].  [phone]  Availabe to call or text on Microsoft TEAMS.
- Review of records, telemetry, vital signs and daily labs.   - General and cardiovascular physical examination.  - Generation of cardiovascular treatment plan and completion of note .  - Coordination of care.      Patient was seen and examined by me on 02/04/2025 ,interim events noted,labs and radiology studies reviewed.  Solo Quick MD,FACC.  [address] [place], [place] [id number].  [phone]  Availabe to call or text on Microsoft TEAMS.
- Review of records, telemetry, vital signs and daily labs.   - General and cardiovascular physical examination.  - Generation of cardiovascular treatment plan and completion of note .  - Coordination of care.      Patient was seen and examined by me on 02/13/2025,interim events noted,labs and radiology studies reviewed.  Solo Quick MD,FACC.  [address] [place], [place] [id number].  [phone]  Availabe to call or text on Microsoft TEAMS.
- Review of records, telemetry, vital signs and daily labs.   - General and cardiovascular physical examination.  - Generation of cardiovascular treatment plan and completion of note .  - Coordination of care.      Patient was seen and examined by me on 03/14/2025,interim events noted,labs and radiology studies reviewed.  Solo Quick MD,FACC.  [address] [place], [place] [id number].  [phone]  Availabe to call or text on Microsoft TEAMS.
reviewing chart and coordinating care with primary team/staff, as well as reviewing vitals, radiology, medication list, recent labs, and prior records.
- Review of records, telemetry, vital signs and daily labs.   - General and cardiovascular physical examination.  - Generation of cardiovascular treatment plan and completion of note .  - Coordination of care.      Patient was seen and examined by me on  01/03/2025 ,interim events noted,labs and radiology studies reviewed.  Solo Quick MD,FACC.  [address] [place], [place] [id number].  [phone]  Availabe to call or text on Microsoft TEAMS.
- Review of records, telemetry, vital signs and daily labs.   - General and cardiovascular physical examination.  - Generation of cardiovascular treatment plan and completion of note .  - Coordination of care.      Patient was seen and examined by me on  01/07/2025 ,interim events noted,labs and radiology studies reviewed.  Solo Quick MD,FACC.  [address] [place] [address] [place], [place] [id number].  [phone]  Availabe to call or text on Microsoft TEAMS.
- Review of records, telemetry, vital signs and daily labs.   - General and cardiovascular physical examination.  - Generation of cardiovascular treatment plan and completion of note .  - Coordination of care.      Patient was seen and examined by me on  01/24/2024 ,interim events noted,labs and radiology studies reviewed.  Solo Quick MD,FACC.  [address] [place], [place] [id number].  [phone]  Availabe to call or text on Microsoft TEAMS.
- Review of records, telemetry, vital signs and daily labs.   - General and cardiovascular physical examination.  - Generation of cardiovascular treatment plan and completion of note .  - Coordination of care.      Patient was seen and examined by me on  02/01/2025 ,interim events noted,labs and radiology studies reviewed.  Solo Quick MD,FACC.  [address] [place], [place] [id number].  [phone]  Availabe to call or text on Microsoft TEAMS.
- Review of records, telemetry, vital signs and daily labs.   - General and cardiovascular physical examination.  - Generation of cardiovascular treatment plan and completion of note .  - Coordination of care.      Patient was seen and examined by me on  02/11/2025 ,interim events noted,labs and radiology studies reviewed.  Solo Quick MD,FACC.  [address] [place], [place] [id number].  [phone]  Availabe to call or text on Microsoft TEAMS.
- Review of records, telemetry, vital signs and daily labs.   - General and cardiovascular physical examination.  - Generation of cardiovascular treatment plan and completion of note .  - Coordination of care.      Patient was seen and examined by me on  03/04/2025 ,interim events noted,labs and radiology studies reviewed.  Solo Quick MD,FACC.  [address] [place], [place] [id number].  [phone]  Availabe to call or text on Microsoft TEAMS.
- Review of records, telemetry, vital signs and daily labs.   - General and cardiovascular physical examination.  - Generation of cardiovascular treatment plan and completion of note .  - Coordination of care.      Patient was seen and examined by me on  12/26/24,interim events noted,labs and radiology studies reviewed.  Solo Quick MD,FACC.  [address] [place], [place] [id number].  [phone]  Availabe to call or text on Microsoft TEAMS.
- Review of records, telemetry, vital signs and daily labs.   - General and cardiovascular physical examination.  - Generation of cardiovascular treatment plan and completion of note .  - Coordination of care.      Patient was seen and examined by me on 01/08/2025,interim events noted,labs and radiology studies reviewed.  Solo Quick MD,FACC.  6050 [facility]07090.  [phone]  Availabe to call or text on Microsoft TEAMS. b
- Review of records, telemetry, vital signs and daily labs.   - General and cardiovascular physical examination.  - Generation of cardiovascular treatment plan and completion of note .  - Coordination of care.      Patient was seen and examined by me on 02/02/2025,interim events noted,labs and radiology studies reviewed.  Solo Quick MD,FACC.  [address] [place], [place] [id number].  [phone]  Availabe to call or text on Microsoft TEAMS.
- Review of records, telemetry, vital signs and daily labs.   - General and cardiovascular physical examination.  - Generation of cardiovascular treatment plan and completion of note .  - Coordination of care.      Patient was seen and examined by me on 02/05/2025 ,interim events noted,labs and radiology studies reviewed.  Solo Quick MD,FACC.  [address] [place], [place] [id number].  [phone]  Availabe to call or text on Microsoft TEAMS.
- Review of records, telemetry, vital signs and daily labs.   - General and cardiovascular physical examination.  - Generation of cardiovascular treatment plan and completion of note .  - Coordination of care.      Patient was seen and examined by me on 02/14/2025,interim events noted,labs and radiology studies reviewed.  Solo Quick MD,FACC.  [address] [place], [place] [id number].  [phone]  Availabe to call or text on Microsoft TEAMS.
- Review of records, telemetry, vital signs and daily labs.   - General and cardiovascular physical examination.  - Generation of cardiovascular treatment plan and completion of note .  - Coordination of care.      Patient was seen and examined by me on  01/13/2025 ,interim events noted,labs and radiology studies reviewed.  Solo Quick MD,FACC.  [address] [place], [place] [id number].  [phone]  Availabe to call or text on Microsoft TEAMS.
- Review of records, telemetry, vital signs and daily labs.   - General and cardiovascular physical examination.  - Generation of cardiovascular treatment plan and completion of note .  - Coordination of care.      Patient was seen and examined by me on  01/23/2024 ,interim events noted,labs and radiology studies reviewed.  Solo Quick MD,FACC.  [address] [place], [place] [id number].  [phone]  Availabe to call or text on Microsoft TEAMS.
- Review of records, telemetry, vital signs and daily labs.   - General and cardiovascular physical examination.  - Generation of cardiovascular treatment plan and completion of note .  - Coordination of care.      Patient was seen and examined by me on  02/07/2025 ,interim events noted,labs and radiology studies reviewed.  Solo Quick MD,FACC.  [address] [place], [place] [id number].  [phone]  Availabe to call or text on Microsoft TEAMS.
- Review of records, telemetry, vital signs and daily labs.   - General and cardiovascular physical examination.  - Generation of cardiovascular treatment plan and completion of note .  - Coordination of care.      Patient was seen and examined by me on  03/16/2025 ,interim events noted,labs and radiology studies reviewed.  Solo Quick MD,FACC.  [address] [place], [place] [id number].  [phone]  Availabe to call or text on Microsoft TEAMS.
- Review of records, telemetry, vital signs and daily labs.   - General and cardiovascular physical examination.  - Generation of cardiovascular treatment plan and completion of note .  - Coordination of care.      Patient was seen and examined by me on  12/31/2024 ,interim events noted,labs and radiology studies reviewed.  Solo Quick MD,FACC.  5653 [facility]00423.  [phone]  Availabe to call or text on Microsoft TEAMS.
- Review of records, telemetry, vital signs and daily labs.   - General and cardiovascular physical examination.  - Generation of cardiovascular treatment plan and completion of note .  - Coordination of care.      Patient was seen and examined by me on 01/09/2025 ,interim events noted,labs and radiology studies reviewed.  Solo Quick MD,FACC.  [address] [place], [place] [id number].  [phone]  Availabe to call or text on Microsoft TEAMS.
- Review of records, telemetry, vital signs and daily labs.   - General and cardiovascular physical examination.  - Generation of cardiovascular treatment plan and completion of note .  - Coordination of care.      Patient was seen and examined by me on 01/18/2025,interim events noted,labs and radiology studies reviewed.  Solo Quick MD,FACC.  [address] [place], [place] [id number].  [phone]  Availabe to call or text on Microsoft TEAMS.
- Review of records, telemetry, vital signs and daily labs.   - General and cardiovascular physical examination.  - Generation of cardiovascular treatment plan and completion of note .  - Coordination of care.      Patient was seen and examined by me on 01/20/2025,interim events noted,labs and radiology studies reviewed.  Solo Quick MD,FACC.  [address] [place], [place] [id number].  [phone]  Availabe to call or text on Microsoft TEAMS.
- Review of records, telemetry, vital signs and daily labs.   - General and cardiovascular physical examination.  - Generation of cardiovascular treatment plan and completion of note .  - Coordination of care.      Patient was seen and examined by me on 02/21/2025,interim events noted,labs and radiology studies reviewed.  Solo Quick MD,FACC.  [address] [place], [place] [id number].  [phone]  Availabe to call or text on Microsoft TEAMS.
- Review of records, telemetry, vital signs and daily labs.   - General and cardiovascular physical examination.  - Generation of cardiovascular treatment plan and completion of note .  - Coordination of care.      Patient was seen and examined by me on 02/23/2025,interim events noted,labs and radiology studies reviewed.  Solo Quick MD,FACC.  3066 [facility]95557.  [phone]  Availabe to call or text on Microsoft TEAMS.
- Review of records, telemetry, vital signs and daily labs.   - General and cardiovascular physical examination.  - Generation of cardiovascular treatment plan and completion of note .  - Coordination of care.      Patient was seen and examined by me on 02/24/2025 ,interim events noted,labs and radiology studies reviewed.  Solo Quick MD,FACC.  [address] [place], [place] [id number].  [phone]  Availabe to call or text on Microsoft TEAMS.
- Review of records, telemetry, vital signs and daily labs.   - General and cardiovascular physical examination.  - Generation of cardiovascular treatment plan and completion of note .  - Coordination of care.      Patient was seen and examined by me on 03/07/2025 ,interim events noted,labs and radiology studies reviewed.  Solo Quick MD,FACC.  [address] [place], [place] [id number].  [phone]  Availabe to call or text on Microsoft TEAMS.
- Review of records, telemetry, vital signs and daily labs.   - General and cardiovascular physical examination.  - Generation of cardiovascular treatment plan and completion of note .  - Coordination of care.      Patient was seen and examined by me on 03/15/2025 ,interim events noted,labs and radiology studies reviewed.  Solo Quick MD,FACC.  [address] [place], [place] [id number].  [phone]  Availabe to call or text on Microsoft TEAMS.
- Review of records, telemetry, vital signs and daily labs.   - General and cardiovascular physical examination.  - Generation of cardiovascular treatment plan and completion of note .  - Coordination of care.      Patient was seen and examined by me on 3/5/25,interim events noted,labs and radiology studies reviewed.  Solo Quick MD,FACC.  [address] [place], [place] [id number].  [phone]  Availabe to call or text on Microsoft TEAMS.
- Review of records, telemetry, vital signs and daily labs.   - General and cardiovascular physical examination.  - Generation of cardiovascular treatment plan and completion of note .  - Coordination of care.      Patient was seen and examined by me on  01/04/2025 ,interim events noted,labs and radiology studies reviewed.  Solo Quick MD,FACC.  [address] [place], [place] [id number].  [phone]  Availabe to call or text on Microsoft TEAMS.
- Review of records, telemetry, vital signs and daily labs.   - General and cardiovascular physical examination.  - Generation of cardiovascular treatment plan and completion of note .  - Coordination of care.      Patient was seen and examined by me on  01/25/2025 ,interim events noted,labs and radiology studies reviewed.  Solo Quick MD,FACC.  5671 [facility]67232.  [phone]  Availabe to call or text on Microsoft TEAMS.
- Review of records, telemetry, vital signs and daily labs.   - General and cardiovascular physical examination.  - Generation of cardiovascular treatment plan and completion of note .  - Coordination of care.      Patient was seen and examined by me on  01/26/2025 ,interim events noted,labs and radiology studies reviewed.  Solo Quick MD,FACC.  0632 [facility]73741.  [phone]  Availabe to call or text on Microsoft TEAMS.
- Review of records, telemetry, vital signs and daily labs.   - General and cardiovascular physical examination.  - Generation of cardiovascular treatment plan and completion of note .  - Coordination of care.      Patient was seen and examined by me on  01/31/2025 ,interim events noted,labs and radiology studies reviewed.  Solo Quick MD,FACC.  [address] [place], [place] [id number].  [phone]  Availabe to call or text on Microsoft TEAMS.
- Review of records, telemetry, vital signs and daily labs.   - General and cardiovascular physical examination.  - Generation of cardiovascular treatment plan and completion of note .  - Coordination of care.      Patient was seen and examined by me on  02/08/2025 ,interim events noted,labs and radiology studies reviewed.  Solo Quick MD,FACC.  [address] [place], [place] [id number].  [phone]  Availabe to call or text on [employer] TEAMS.
- Review of records, telemetry, vital signs and daily labs.   - General and cardiovascular physical examination.  - Generation of cardiovascular treatment plan and completion of note .  - Coordination of care.      Patient was seen and examined by me on  02/28/2025 ,interim events noted,labs and radiology studies reviewed.  Solo Quick MD,FACC.  [address] [place], [place] [id number].  [phone]  Availabe to call or text on Microsoft TEAMS.
- Review of records, telemetry, vital signs and daily labs.   - General and cardiovascular physical examination.  - Generation of cardiovascular treatment plan and completion of note .  - Coordination of care.      Patient was seen and examined by me on  03/17/2025 ,interim events noted,labs and radiology studies reviewed.  Solo Quick MD,FACC.  [address] [place], [place] [id number].  [phone]  Availabe to call or text on Microsoft TEAMS.
- Review of records, telemetry, vital signs and daily labs.   - General and cardiovascular physical examination.  - Generation of cardiovascular treatment plan and completion of note .  - Coordination of care.      Patient was seen and examined by me on 03/01/2025,interim events noted,labs and radiology studies reviewed.  Solo Quick MD,FACC.  [address] [place], [place] [id number].  [phone]  Availabe to call or text on Microsoft TEAMS.
- Review of records, telemetry, vital signs and daily labs.   - General and cardiovascular physical examination.  - Generation of cardiovascular treatment plan and completion of note .  - Coordination of care.      Patient was seen and examined by me on 03/12/2025 ,interim events noted,labs and radiology studies reviewed.  Solo Quick MD,FACC.  [address] [place], [place] [id number].  [phone]  Availabe to call or text on Microsoft TEAMS.
conducting Interview, examination, reviewing vitals, laboratory and radiological images; besides coordinating care with primary team and consultants.
- Review of records, telemetry, vital signs and daily labs.   - General and cardiovascular physical examination.  - Generation of cardiovascular treatment plan and completion of note .  - Coordination of care.      Patient was seen and examined by me on  01/05/2025 ,interim events noted,labs and radiology studies reviewed.  Solo Quick MD,FACC.  7194 [facility]21.  [phone]  Availabe to call or text on Microsoft TEAMS.
- Review of records, telemetry, vital signs and daily labs.   - General and cardiovascular physical examination.  - Generation of cardiovascular treatment plan and completion of note .  - Coordination of care.      Patient was seen and examined by me on  01/27/2025 ,interim events noted,labs and radiology studies reviewed.  Solo Quick MD,FACC.  [address] [facility]162.  [phone]  Availabe to call or text on Microsoft TEAMS.
- Review of records, telemetry, vital signs and daily labs.   - General and cardiovascular physical examination.  - Generation of cardiovascular treatment plan and completion of note .  - Coordination of care.      Patient was seen and examined by me on  02.18/2025 ,interim events noted,labs and radiology studies reviewed.  Solo Quick MD,FACC.  [address] [facility]454.  [phone]  Availabe to call or text on Microsoft TEAMS.
- Review of records, telemetry, vital signs and daily labs.   - General and cardiovascular physical examination.  - Generation of cardiovascular treatment plan and completion of note .  - Coordination of care.      Patient was seen and examined by me on  02/15/2025 ,interim events noted,labs and radiology studies reviewed.  Solo Quick MD,FACC.  [address] [place], [place] [id number].  [phone]  Availabe to call or text on Microsoft TEAMS.
- Review of records, telemetry, vital signs and daily labs.   - General and cardiovascular physical examination.  - Generation of cardiovascular treatment plan and completion of note .  - Coordination of care.      Patient was seen and examined by me on  12/30/2024 ,interim events noted,labs and radiology studies reviewed.  Solo Quick MD,FACC.  5168 [facility]78746.  [phone]  Availabe to call or text on Microsoft TEAMS.
- Review of records, telemetry, vital signs and daily labs.   - General and cardiovascular physical examination.  - Generation of cardiovascular treatment plan and completion of note .  - Coordination of care.      Patient was seen and examined by me on 01/16/2025 ,interim events noted,labs and radiology studies reviewed.  Solo Quick MD,FACC.  [address] [place], [place] [id number].  [phone]  Availabe to call or text on Microsoft TEAMS.
- Review of records, telemetry, vital signs and daily labs.   - General and cardiovascular physical examination.  - Generation of cardiovascular treatment plan and completion of note .  - Coordination of care.      Patient was seen and examined by me on 01/22/2025 ,interim events noted,labs and radiology studies reviewed.  Solo Quick MD,FACC.  4545 [facility]130.  [phone]  Availabe to call or text on Microsoft TEAMS.
- Review of records, telemetry, vital signs and daily labs.   - General and cardiovascular physical examination.  - Generation of cardiovascular treatment plan and completion of note .  - Coordination of care.      Patient was seen and examined by me on 02/03/2025,interim events noted,labs and radiology studies reviewed.  Solo Quick MD,FACC.  [address] [place], [place] [id number].  [phone]  Availabe to call or text on Microsoft TEAMS.
- Review of records, telemetry, vital signs and daily labs.   - General and cardiovascular physical examination.  - Generation of cardiovascular treatment plan and completion of note .  - Coordination of care.      Patient was seen and examined by me on 02/06/2025 ,interim events noted,labs and radiology studies reviewed.  Solo Quick MD,FACC.  [address] [place], [place] [id number].  [phone]  Availabe to call or text on Microsoft TEAMS.
- Review of records, telemetry, vital signs and daily labs.   - General and cardiovascular physical examination.  - Generation of cardiovascular treatment plan and completion of note .  - Coordination of care.      Patient was seen and examined by me on 12/28/2024,interim events noted,labs and radiology studies reviewed.  Solo Quick MD,FACC.  8812 [facility]75688.  [phone]  Availabe to call or text on Microsoft TEAMS.
conducting Interview, examination, reviewing vitals, laboratory and radiological images; besides coordinating care with primary team and consultants.
- Review of records, telemetry, vital signs and daily labs.   - General and cardiovascular physical examination.  - Generation of cardiovascular treatment plan and completion of note .  - Coordination of care.      Patient was seen and examined by me on  01/12/2025 ,interim events noted,labs and radiology studies reviewed.  Solo Quick MD,FACC.  [address] [place], [place] [id number].  [phone]  Availabe to call or text on Microsoft TEAMS.
- Review of records, telemetry, vital signs and daily labs.   - General and cardiovascular physical examination.  - Generation of cardiovascular treatment plan and completion of note .  - Coordination of care.      Patient was seen and examined by me on  01/30/2025 ,interim events noted,labs and radiology studies reviewed.  Solo Quick MD,FACC.  2366 [facility]93557.  [phone]  Availabe to call or text on Microsoft TEAMS.
- Review of records, telemetry, vital signs and daily labs.   - General and cardiovascular physical examination.  - Generation of cardiovascular treatment plan and completion of note .  - Coordination of care.      Patient was seen and examined by me on  02/22/2025 ,interim events noted,labs and radiology studies reviewed.  Solo Quick MD,FACC.  4087 [facility].  [phone]  Availabe to call or text on Microsoft TEAMS.
- Review of records, telemetry, vital signs and daily labs.   - General and cardiovascular physical examination.  - Generation of cardiovascular treatment plan and completion of note .  - Coordination of care.      Patient was seen and examined by me on  03/02/2025 ,interim events noted,labs and radiology studies reviewed.  Solo Quick MD,FACC.  [address] [place], [place] [id number].  [phone]  Availabe to call or text on Microsoft TEAMS.
- Review of records, telemetry, vital signs and daily labs.   - General and cardiovascular physical examination.  - Generation of cardiovascular treatment plan and completion of note .  - Coordination of care.      Patient was seen and examined by me on  03/09/2025 ,interim events noted,labs and radiology studies reviewed.  Solo Quick MD,FACC.  [address] [place], [place] [id number].  [phone]  Availabe to call or text on Microsoft TEAMS.
- Review of records, telemetry, vital signs and daily labs.   - General and cardiovascular physical examination.  - Generation of cardiovascular treatment plan and completion of note .  - Coordination of care.      Patient was seen and examined by me on 01/15/2025 ,interim events noted,labs and radiology studies reviewed.  Solo Quick MD,FACC.  [address] [place], [place] [id number].  [phone]  Availabe to call or text on Microsoft TEAMS.
- Review of records, telemetry, vital signs and daily labs.   - General and cardiovascular physical examination.  - Generation of cardiovascular treatment plan and completion of note .  - Coordination of care.      Patient was seen and examined by me on 02/12/2025,interim events noted,labs and radiology studies reviewed.  Solo Quick MD,FACC.  [address] [place], [place] [id number].  [phone]  Availabe to call or text on Microsoft TEAMS.
- Review of records, telemetry, vital signs and daily labs.   - General and cardiovascular physical examination.  - Generation of cardiovascular treatment plan and completion of note .  - Coordination of care.      Patient was seen and examined by me on 02/27/2025 ,interim events noted,labs and radiology studies reviewed.  Solo Quick MD,FACC.  3287 [facility]11255.  [phone]  Availabe to call or text on Microsoft TEAMS.
- Review of records, telemetry, vital signs and daily labs.   - General and cardiovascular physical examination.  - Generation of cardiovascular treatment plan and completion of note .  - Coordination of care.      Patient was seen and examined by me on 03/19/2025,interim events noted,labs and radiology studies reviewed.  Solo Quick MD,FACC.  [address] [place], [place] [id number].  [phone]  Availabe to call or text on Microsoft TEAMS.
- Review of records, telemetry, vital signs and daily labs.   - General and cardiovascular physical examination.  - Generation of cardiovascular treatment plan and completion of note .  - Coordination of care.      Patient was seen and examined by me on  3/18/25,interim events noted,labs and radiology studies reviewed.  Solo Quick MD,FACC.  [address] [place], [place] [id number].  [phone]  Availabe to call or text on Microsoft TEAMS.
chart and data review, clinical assessment, and coordination of care. This excludes any time spent on separate procedures or teaching.
- Review of records, telemetry, vital signs and daily labs.   - General and cardiovascular physical examination.  - Generation of cardiovascular treatment plan and completion of note .  - Coordination of care.      Patient was seen and examined by me on 3/6/25 ,interim events noted,labs and radiology studies reviewed.  Solo Quick MD,FACC.  [address] [place], [place] [id number].  [phone]  Availabe to call or text on Microsoft TEAMS.
- Review of records, telemetry, vital signs and daily labs.   - General and cardiovascular physical examination.  - Generation of cardiovascular treatment plan and completion of note .  - Coordination of care.      Patient was seen and examined by me on 3/11/25 ,interim events noted,labs and radiology studies reviewed.  Solo Quick MD,FACC.  [address] [place], [place] [id number].  [phone]  Availabe to call or text on Microsoft TEAMS.
- Review of records, telemetry, vital signs and daily labs.   - General and cardiovascular physical examination.  - Generation of cardiovascular treatment plan and completion of note .  - Coordination of care.      Patient was seen and examined by me on  3.13.25,interim events noted,labs and radiology studies reviewed.  Solo Quick MD,FACC.  [address] [place], [place] [id number].  [phone]  Availabe to call or text on Microsoft TEAMS.

## 2025-03-19 NOTE — PROGRESS NOTE ADULT - PROBLEM SELECTOR PROBLEM 7
E. coli infect

## 2025-03-19 NOTE — CHART NOTE - NSCHARTNOTEFT_GEN_A_CORE
NUTRITION FOLLOW UP NOTE    PATIENT SEEN FOR: malnutrition follow up     SOURCE: [x] Patient  [x] Current Medical Record  [] RN  [] Family/support person at bedside  [] Patient unavailable/inappropriate  [x] Other: team during interdisciplinary rounds     CHART REVIEWED/EVENTS NOTED.  [] No changes to nutrition care plan to note  [x] Nutrition Status:  -S/P CABG x 3  -ESRD on dialysis  -3/3 Swallow FEES Assessment Adult Recommendations: "Continue NPO w/ alternative means of nutrition/hydration/medication"  -3/14 Swallow FEES Assessment Adult Recommendations: "regular solids and thin liquids (cup only; no straw)"    Diet, Regular:   Supplement Feeding Modality:  Oral  Nepro Cans or Servings Per Day:  1       Frequency:  Two Times a day (25 @ 11:28) [Active]    CURRENT DIET ORDER IS:  [] Appropriate:  [] Inadequate:   [x] Other: see below for recommendations    NUTRITION INTAKE/PROVISION:  [x] PO: adequate. pt does not like nepro, will tell kitchen to d/c   [] Enteral Nutrition:   [] Parenteral Nutrition:    ANTHROPOMETRICS:  Drug Dosing Weight  Height (cm): 180.3 (30 Dec 2024 12:01)  Weight (kg): 85.1 (30 Dec 2024 12:01)  BMI (kg/m2): 26.2 (30 Dec 2024 12:)  BSA (m2): 2.05 (30 Dec 2024 12:01)  Weights:   Daily Weight in k.3 (), Daily Weight in k.2 (148 pounds) () - bedscale and no edema noted, Weight in k.9 (), Weight in k.9 (), Weight in k.9 (), Weight in k.6 (184 pounds) () no edema noted  fluctuations noted, on HD.   RD will continue to monitor trends.     MEDICATIONS  (STANDING):  ascorbic acid  atorvastatin  dextrose 50% Injectable  dextrose 50% Injectable  ferrous    sulfate Liquid  insulin lispro (ADMELOG) corrective regimen sliding scale  insulin lispro (ADMELOG) corrective regimen sliding scale  metoprolol tartrate  pantoprazole    Tablet    Pertinent Labs:   A1C with Estimated Average Glucose Result: 5.6 % (24 @ 06:50)    Finger Sticks:  POCT Blood Glucose.: 121 mg/dL ( @ 07:21)  POCT Blood Glucose.: 153 mg/dL ( @ 16:30)  POCT Blood Glucose.: 128 mg/dL ( @ 11:19)    NUTRITIONALLY PERTINENT MEDICATIONS/LABS:  [x] Reviewed  [x] Relevant notes on medications/labs:  -Hgb A1c within normal limits     EDEMA:  [x] Reviewed  [] Relevant notes:    GI/ I&O:  [x] Reviewed  [] Relevant notes:  [] Other:    SKIN:   per flow sheets:  mid sternal incision   coccyx stage IV    ESTIMATED NEEDS:  no change     NUTRITION DIAGNOSIS:  [x] Prior Dx:  Moderate acute malnutrition, Increased Nutrient Needs (protein-energy, micronutrients)  [] New Dx:     EDUCATION:  [x] Yes: Asked pt if he wanted diet education, declined at this time.   [] Not appropriate/warranted    NUTRITION CARE PLAN:  1. Diet: monitor renal biomarkers for need to add renal restriction to diet. consider d/c nepro due to pt preference and RD to add double protein at breakfast to aid in meeting nutrient needs  2. Multivitamin/mineral supplementation: continue as ordered to aid in wound healing if no contraindications     [] Achieved - Continue current nutrition intervention(s)  [] Current medical condition precludes nutrition intervention at this time.    MONITORING AND EVALUATION:   RD remains available upon request and will follow up per protocol.    Kaelyn Zhang, MS, RD, CDN / Teams

## 2025-03-19 NOTE — DISCHARGE NOTE PROVIDER - HOSPITAL COURSE
55 y/o male with PMHx of HTN, HLD, ESRD on HD MWF via LUE AVF, ascites (requiring repeat paracenteses q4-6wks), NICM with moderate LV dysfunction w/ EF 35-40%() and CAD s/p atherectomy + SALLIE to D1(2024) presents to [facility] transferred from [facility]. Patient initially presented to [facility] ED with shortness of breath. Of note he was recently admitted from 24-24 for acute cholecystitis s/p cholecystectomy. In [facility] ED, pt was hypoxic to 86% on RA, trop 1.7K, EKG no new changes, CXR fluid overload. Admitted for AHRF 2/2 fluid overload. Pt received HD on , ,  and 24. DAPT was resumed, TTE showed improvement in LVEF to 40-45%. Stress test was abnormal with 1mm inferior STD, reversible ischemia in the inferior wall and fixed defects in the lateral, anterior and apical walls with post stress EF of 24%. Pt was then transferred to [facility] for cardiac cath which showed pLAD 70% ISR, mLCx 70%, D1 severe dz. Patient now transferred to [facility] CTS Dr. Rivers for CABG eval. Patient denies any current SOB or chest pain on admission.     24 CABGx3 (LIMA-LAD; SVG-Diag; SVG-leftPL)  1/: Intubated for hypoxia & left lung white out s/p awake bronch with removal of copious mucopurulent secretions  : s/p IABP insertion, sepsis/septic shock due to E coli   ESBL pneumonia, collapsed Left Lung, s/p multiple bronch    tracheostomy tube placement    VRE E. Faecium Bcx   +HSV PCR BAL   tissue cx from back abscess - Serratia/MRSA  - - intermittent bradycardia/junctional episodes with hypotension  2/3: off , off theophylline. iHD 1L UF  : bronch for Left lung collapse wound vac, 2decho w/ moderate pericardial effusion - similar as before, US abd: small - moderate ascites - monitor at this time.  Wound vac placed for sacral wounds  : iHD-1L UF  : bronch today off positive pressure  2/8 : concern for left food drop   2/10 : no acute issues - CXR shows improving left sided aeration, pt subjectively reports having difficulty while lying flat  : s/p Paracentesis 3.5 L removed, leukocytosis   : Ascites fluid PMN < 250 (less likely SBP)   sniff test yesterday showed paradoxical diaphragmatic motion    : mucus plugging but able to clear airway with aggressive pulmonary toileting.   : no vent requirement overnight, able to mobilize secretion with pulm toileting  hyperkalemia post HD - workup for possible recirculation underway   : On TC X 48 hours - ambulating well, no mucus plugging events  : HFTC x24hrs (40L 30%)  : iHD 1.5L UF  : iHD 2L  : trach downsized to 7, bronch  - cultures growing ESBL E Coli, sacral wound vac changed   3/6 Transfer to SDU, +PW isolated, +sacral wound vac in place, + Tube feeds running, next HD scheduled for tomorrow   3/7 VSS; Continue with current medication regimen.  Continue on TC 40% 02.  Plan for HD today.  Nocturnal feeding.  3/8  FEES 3/3 notable for poor secretion management evidenced unable to clear 2/2 weak cough B/l vocal cords appeared to be adducted at midline. ENT eval demonstrates  limited vocal cord abduction vs. questionable vocal cord paresis--no plan for ENT intervention at this time, No PMV . Local sacral wound care by PT.   3/9 HD tomorrow, US abd in am Tolerating pm feeds  3/10 US abd and HD today. CXR PA/LAT. Consider ENT to reassess VC paresis feasibility of decannulation   Disposition: Home PT / OT     3/11  suction prn.  OOB to chair.  Eliquis for a flutter.  ENT note appreciated  Maintain npo, S&S f/u  3/12 The pt is for HD today, plan on trach decannulation after dialysis. Suction x 1 this am for minimal secretions,   3/13 VSS speech and swallow evaluation today  labs with HD shower today  3/14 VSS for FEES today= Passed regular diet   HD today  3/15 needs encouragement for OOB to chair --> educated ALL MEAL to be OOB in chair. encouraged increase ambulation as tolerated. RN Matteo aware Chest PT prn.   3/16 OOB to chair --> tolerating diet; encouraged to increase ambulation as tolerated; pending rpt US Abdomen on Monday for eval of ascites. Labs on HD day, HD planned for Monday per Renal.  3/17  Ambulate.  Tolerating a regular diet.  Abd US d/c as d/w Dr Rivers HD today. D/c planning  Tx to floor  3/18 HD tomorrow.   3/19 VSS; Afib 80's; vac changed today; HD  discharge pt home today as per DR. Rivers

## 2025-03-19 NOTE — PROGRESS NOTE ADULT - SUBJECTIVE AND OBJECTIVE BOX
MR#[id number]  PATIENT NAME:RAAD CANO    DATE OF SERVICE: 03-19-25 @ 05:56  Patient was seen and examined by Solo Quick MD on    03-19-25 @ 05:56 .  Interim events noted.Consultant notes ,Labs,Telemetry reviewed by me       HOSPITAL COURSE: HPI:54M with PMH of HTN, HLD, ESRD on HD MWF via LUE AVF, ascites (requiring repeat paracenteses q4-6wks), NICM with moderate LV dysfunction w/ EF 35-40%(2023) and CAD s/p atherectomy + SALLIE to D1(4/11/2024) presents to [facility] transferred from [facility].  Cardiac cath which showed pLAD 70% ISR, mLCx 70%, D1 severe dz.   Patient now transferred to [facility] CTS Dr. Rivers for CABG eval.     (24 Dec 2024 05:07)      INTERIM EVENTS:Patient seen at bedside ,interim events noted.  12/23 Cardiac cath  pLAD 70% ISR, mLCx 70%, D1 severe dz.   12/31-POD#1-C3L free LIMA-LAD; SVG-Diag; SVG-leftPL Awake OOB extubated Sinus rhythm on Dobutamine gtt  03/04-Awake Had FEES still NPO Atrial Flutter   03/06-Tx to SDU  03/07-Awake Atrial Flutter   03/08-Awake VSS Atrial Flutter still on NGT  03/09-Awake Atrial Flutter on TC 30% still has poor cough  03/10-Awake lying in bed Atrial Flutter on TC 30% for US Abdomen today  03/12-Still NPO Atrial Flutter no dyspnea seen by ENT  03/14-Trach removed  03/15-Awake Had FEES-passed for regular diet-AFl   03/16-OOB feels well no dyspnea-remains in Atrial Flutter   03/19-AWake no dyspnea tolerating diet    AMBULATION: Assisted[ ] Cane/walker[ ] Independent[ x]    MEDICATIONS  (STANDING):  apixaban 2.5 milliGRAM(s) Oral <User Schedule>  ascorbic acid 500 milliGRAM(s) Oral daily  aspirin  chewable 81 milliGRAM(s) Oral daily  atorvastatin 80 milliGRAM(s) Oral at bedtime  buDESOnide    Inhalation Suspension 0.5 milliGRAM(s) Inhalation two times a day  chlorhexidine 4% Liquid 1 Application(s) Topical <User Schedule>  dextrose 50% Injectable 50 milliLiter(s) IV Push every 15 minutes  dextrose 50% Injectable 25 Gram(s) IV Push once  epoetin ignacia (EPOGEN) Injectable 12458 Unit(s) IV Push <User Schedule>  ferrous    sulfate Liquid 300 milliGRAM(s) Enteral Tube daily  insulin lispro (ADMELOG) corrective regimen sliding scale   SubCutaneous three times a day before meals  insulin lispro (ADMELOG) corrective regimen sliding scale   SubCutaneous at bedtime  melatonin 5 milliGRAM(s) Oral at bedtime  methocarbamol 500 milliGRAM(s) Oral every 8 hours  metoprolol tartrate 25 milliGRAM(s) Oral two times a day  mirtazapine 7.5 milliGRAM(s) Oral at bedtime  pantoprazole    Tablet 40 milliGRAM(s) Oral before breakfast  sodium chloride 0.65% Nasal 1 Spray(s) Both Nostrils every 12 hours  sodium chloride 0.9% for Nebulization 3 milliLiter(s) Nebulizer every 6 hours  sodium zirconium cyclosilicate 10 Gram(s) Oral <User Schedule>  traZODone 100 milliGRAM(s) Oral at bedtime    MEDICATIONS  (PRN):  acetaminophen     Tablet .. 650 milliGRAM(s) Oral every 6 hours PRN Mild Pain (1 - 3)  benzocaine/menthol Lozenge 1 Lozenge Oral every 4 hours PRN Sore Throat  lidocaine/prilocaine Cream 1 Application(s) Topical daily PRN pre-HD  oxyCODONE    IR 10 milliGRAM(s) Oral every 6 hours PRN Severe Pain (7 - 10)  oxyCODONE    IR 5 milliGRAM(s) Oral every 4 hours PRN Moderate Pain (4 - 6)  simethicone 80 milliGRAM(s) Chew three times a day PRN Gas  sodium chloride 0.9% lock flush 10 milliLiter(s) IV Push every 1 hour PRN Pre/post blood products, medications, blood draw, and to maintain line patency            REVIEW OF SYSTEMS:  Constitutional: [ ] fever, [ ]weight loss,  [ ]fatigue [ ]weight gain  Eyes: [ ] visual changes  Respiratory: [ ]shortness of breath;  [ ] cough, [ ]wheezing, [ ]chills, [ ]hemoptysis  Cardiovascular: [ ] chest pain, [ ]palpitations, [ ]dizziness,  [ ]leg swelling[ ]orthopnea[ ]PND  Gastrointestinal: [ ] abdominal pain, [ ]nausea, [ ]vomiting,  [ ]diarrhea [ ]Constipation [ ]Melena  Genitourinary: [ ] dysuria, [ ] hematuria [ ]Vigil  Neurologic: [ ] headaches [ ] tremors[ ]weakness [ ]Paralysis Right[ ] Left[ ]  Skin: [ ] itching, [ ]burning, [ ] rashes  Endocrine: [ ] heat or cold intolerance  Musculoskeletal: [ ] joint pain or swelling; [ ] muscle, back, or extremity pain  Psychiatric: [ ] depression, [ ]anxiety, [ ]mood swings, or [ ]difficulty sleeping  Hematologic: [ ] easy bruising, [ ] bleeding gums    [ ] All remaining systems negative except as per above.   [ ]Unable to obtain.  [x] No change in ROS since admission      Vital Signs Last 24 Hrs  T(C): 36.8 (18 Mar 2025 13:02), Max: 36.8 (18 Mar 2025 13:02)  T(F): 98.2 (18 Mar 2025 13:02), Max: 98.2 (18 Mar 2025 13:02)  HR: 96 (18 Mar 2025 18:00) (67 - 96)  BP: 114/74 (18 Mar 2025 18:00) (107/62 - 119/61)  BP(mean): 87 (18 Mar 2025 18:00) (77 - 87)  RR: 20 (18 Mar 2025 18:00) (20 - 20)  SpO2: 96% (18 Mar 2025 18:00) (96% - 98%)    Parameters below as of 18 Mar 2025 18:00  Patient On (Oxygen Delivery Method): room air      I&O's Summary    17 Mar 2025 07:01  -  18 Mar 2025 07:00  --------------------------------------------------------  IN: 1220 mL / OUT: 2001 mL / NET: -781 mL    18 Mar 2025 07:01  -  19 Mar 2025 05:56  --------------------------------------------------------  IN: 1020 mL / OUT: 0 mL / NET: 1020 mL        PHYSICAL EXAM:  General: No acute distress BMI-  HEENT: EOMI, PERRL  Neck: Supple, [ ] JVD  Lungs: Equal air entry bilaterally; [ ] rales [ ] wheezing [ ] rhonchi  Heart: Regular rate and rhythm; [x ] murmur   2/6 [ x] systolic [ ] diastolic [ ] radiation[ ] rubs [ ]  gallops  Abdomen: Nontender, bowel sounds present  Extremities: No clubbing, cyanosis, [ ] edema [ ]Pulses  equal and intact  Nervous system:  Alert & Oriented X3, no focal deficits  Psychiatric: Normal affect  Skin: No rashes or lesions    LABS:  03-17    140  |  100  |  57[H]  ----------------------------<  87  5.2   |  20[L]  |  7.06[H]    Ca    8.9      17 Mar 2025 14:54  Phos  4.1     03-17  Mg     2.4     03-17    TPro  7.1  /  Alb  2.8[L]  /  TBili  0.4  /  DBili  x   /  AST  15  /  ALT  10  /  AlkPhos  101  03-17    Creatinine Trend: 7.06<--, 5.45<--, 5.55<--, 6.17<--, 5.31<--, 4.92<--                        9.2    10.87 )-----------( 225      ( 17 Mar 2025 14:54 )             30.5     PT/INR - ( 17 Mar 2025 14:54 )   PT: 16.6 sec;   INR: 1.45 ratio         PTT - ( 17 Mar 2025 14:54 )  PTT:37.5 sec

## 2025-03-19 NOTE — DISCHARGE NOTE PROVIDER - NSDCPNSUBOBJ_GEN_ALL_CORE
VITAL SIGNS    Telemetry:  afib 80's   Vital Signs Last 24 Hrs  T(C): 36.4 (25 @ 13:10), Max: 36.8 (25 @ 06:01)  T(F): 97.6 (25 @ 13:10), Max: 98.2 (25 @ 06:01)  HR: 73 (25 @ 13:10) (65 - 96)  BP: 128/69 (25 @ 13:10) (114/74 - 131/72)  RR: 18 (25 @ 13:10) (18 - 20)  SpO2: 94% (25 @ 13:10) (92% - 96%)             07:01  -   07:00  --------------------------------------------------------  IN: 1140 mL / OUT: 0 mL / NET: 1140 mL     07:01  -   @ 13:28  --------------------------------------------------------  IN: 1380 mL / OUT: 2400 mL / NET: -1020 mL       Daily     Daily Weight in k.8 (19 Mar 2025 13:10)  Admit Wt: Drug Dosing Weight  Height (cm): 180.3 (30 Dec 2024 12:01)  Weight (kg): 85.1 (30 Dec 2024 12:01)  BMI (kg/m2): 26.2 (30 Dec 2024 12:01)  BSA (m2): 2.05 (30 Dec 2024 12:01)      CAPILLARY BLOOD GLUCOSE      POCT Blood Glucose.: 117 mg/dL (19 Mar 2025 12:36)  POCT Blood Glucose.: 121 mg/dL (19 Mar 2025 07:21)  POCT Blood Glucose.: 153 mg/dL (18 Mar 2025 16:30)          LABS:    03-18 @ 07:01  -   @ 07:00  --------------------------------------------------------  IN: 1140 mL / OUT: 0 mL / NET: 1140 mL     @ 07:01  -   @ 13:28  --------------------------------------------------------  IN: 1380 mL / OUT: 2400 mL / NET: -1020 mL    cret                        9.3    10.16 )-----------( 219      ( 19 Mar 2025 10:01 )             29.8     03    139  |  97  |  60[H]  ----------------------------<  132[H]  4.9   |  23  |  6.10[H]    Ca    8.9      19 Mar 2025 10:01  Phos  4.1     -17  Mg     2.2         TPro  7.1  /  Alb  2.8[L]  /  TBili  0.4  /  DBili  x   /  AST  15  /  ALT  10  /  AlkPhos  101  03-17    PT/INR - ( 17 Mar 2025 14:54 )   PT: 16.6 sec;   INR: 1.45 ratio         PTT - ( 17 Mar 2025 14:54 )  PTT:37.5 sec    acetaminophen     Tablet .. 650 milliGRAM(s) Oral every 6 hours PRN  apixaban 2.5 milliGRAM(s) Oral <User Schedule>  ascorbic acid 500 milliGRAM(s) Oral daily  aspirin  chewable 81 milliGRAM(s) Oral daily  atorvastatin 80 milliGRAM(s) Oral at bedtime  benzocaine/menthol Lozenge 1 Lozenge Oral every 4 hours PRN  buDESOnide    Inhalation Suspension 0.5 milliGRAM(s) Inhalation two times a day  chlorhexidine 4% Liquid 1 Application(s) Topical <User Schedule>  dextrose 50% Injectable 50 milliLiter(s) IV Push every 15 minutes  dextrose 50% Injectable 25 Gram(s) IV Push once  epoetin ignacia (EPOGEN) Injectable 59670 Unit(s) IV Push <User Schedule>  ferrous    sulfate Liquid 300 milliGRAM(s) Enteral Tube daily  insulin lispro (ADMELOG) corrective regimen sliding scale   SubCutaneous three times a day before meals  insulin lispro (ADMELOG) corrective regimen sliding scale   SubCutaneous at bedtime  lidocaine/prilocaine Cream 1 Application(s) Topical daily PRN  melatonin 5 milliGRAM(s) Oral at bedtime  methocarbamol 500 milliGRAM(s) Oral every 8 hours  metoprolol tartrate 25 milliGRAM(s) Oral two times a day  mirtazapine 7.5 milliGRAM(s) Oral at bedtime  oxyCODONE    IR 10 milliGRAM(s) Oral every 6 hours PRN  oxyCODONE    IR 5 milliGRAM(s) Oral every 4 hours PRN  pantoprazole    Tablet 40 milliGRAM(s) Oral before breakfast  simethicone 80 milliGRAM(s) Chew three times a day PRN  sodium chloride 0.65% Nasal 1 Spray(s) Both Nostrils every 12 hours  sodium chloride 0.9% for Nebulization 3 milliLiter(s) Nebulizer every 6 hours  sodium chloride 0.9% lock flush 10 milliLiter(s) IV Push every 1 hour PRN  sodium zirconium cyclosilicate 10 Gram(s) Oral <User Schedule>  traZODone 100 milliGRAM(s) Oral at bedtime      PHYSICAL EXAM    Subjective: "I feel ok. "  Neurology: alert and oriented x 3, nonfocal, no gross deficits  CV : tele:  afib 80's   Sternal Wound :  CDI FERDINAND- sternum stable   Lungs: clear. RR easy, unlabored   Abdomen: soft, nontender, nondistended, positive bowel sounds, + bowel movement   Neg N/V/D   :  HD patient   Extremities:   ANAYA; trace LE edema, neg calf tenderness.   PPP bilaterally ; LUE AV fistula + bruit/ thrill     PW: no  Chest tubes: none         discharge pt home today 3/19 as per DR. Rivers

## 2025-03-19 NOTE — PROGRESS NOTE ADULT - TIME-BASED BILLING (NON-CRITICAL CARE)
Time-based billing (NON-critical care)

## 2025-03-19 NOTE — DISCHARGE NOTE PROVIDER - NSDCFUADDINST_GEN_ALL_CORE_FT
call DR. Rivers for fever > 101  no driving until cleared by DR. Rivers  refer to cardiac surgery do and don't checklist  dialysis every mon, wed and friday  vac changes to sacrum every mon, wed and friday

## 2025-03-19 NOTE — PROGRESS NOTE ADULT - PROBLEM SELECTOR PLAN 7
ID following   Completed meropenem Abx treatment on 3/6

## 2025-03-19 NOTE — PROGRESS NOTE ADULT - PROBLEM SELECTOR PROBLEM 1
S/P CABG x 3
S/P CABG x 3
Elevated hemidiaphragm
Elevated hemidiaphragm
CAD, multiple vessel
S/P CABG x 3
CAD, multiple vessel
S/P CABG x 3

## 2025-03-19 NOTE — DISCHARGE NOTE PROVIDER - NSDCFUSCHEDAPPT_GEN_ALL_CORE_FT
Karan Rivers  [place] [facility]  CTSURG [address]  Scheduled Appointment: 03/26/2025

## 2025-03-19 NOTE — PROGRESS NOTE ADULT - PROBLEM SELECTOR PLAN 3
ISS  monitor finger sticks TID with meals and QHS
A1C 5.6   ISS  monitor finger sticks TID with meals and QHS
ISS  monitor finger sticks TID with meals and QHS
A1C 5.6   ISS  monitor finger sticks TID with meals and QHS
ISS  monitor finger sticks TID with meals and QHS
A1C 5.6   ISS  monitor finger sticks TID with meals and QHS
ISS  monitor finger sticks TID with meals and QHS
ISS  monitor finger sticks TID with meals and QHS
A1C 5.6   ISS  monitor finger sticks TID with meals and QHS
ISS  monitor finger sticks TID with meals and QHS
A1C 5.6   ISS  monitor finger sticks TID with meals and QHS
A1C 5.6   ISS  monitor finger sticks TID with meals and QHS
ISS  monitor finger sticks TID with meals and QHS
A1C 5.6   ISS  monitor finger sticks TID with meals and QHS
ISS  monitor finger sticks TID with meals and QHS

## 2025-03-19 NOTE — PROGRESS NOTE ADULT - PROBLEM SELECTOR PROBLEM 2
ESRD on dialysis

## 2025-03-19 NOTE — PROGRESS NOTE ADULT - SUPERVISING ATTENDING
Dr. Rivers
[place]
Dr. Rivers
Karan Rivers
Tita
Dr. Rivers
Mynor
Dr Rivers
Karan Rivers
Karan Rivers
Tita
Karan Rivers
Dr. Rivers
Dr. Rivers
Tita
Dr. Rivers
Dr. Rivers

## 2025-03-19 NOTE — PROGRESS NOTE ADULT - NUTRITIONAL ASSESSMENT
This patient has been assessed with a concern for Malnutrition and has been determined to have a diagnosis/diagnoses of Moderate protein-calorie malnutrition.    This patient is being managed with:   Diet NPO with Tube Feed-  Tube Feeding Modality: Nasogastric  [employer] Peptide 1.5 (KFPEPT1.5RTH)  Total Volume for 24 Hours (mL): 840  Continuous  Starting Tube Feed Rate {mL per Hour}: 20  Increase Tube Feed Rate by (mL): 10     Every hour  Until Goal Tube Feed Rate (mL per Hour): 70  Tube Feed Duration (in Hours): 12  Tube Feed Start Time: 18:00  Tube Feed Stop Time: 06:00    Start Time: 23:00  Entered: Mar  3 2025  7:58AM  
This patient has been assessed with a concern for Malnutrition and has been determined to have a diagnosis/diagnoses of Moderate protein-calorie malnutrition.    This patient is being managed with:   Diet Regular-  Supplement Feeding Modality:  Oral  Nepro Cans or Servings Per Day:  1       Frequency:  Two Times a day  Entered: Mar 14 2025 11:28AM  
This patient has been assessed with a concern for Malnutrition and has been determined to have a diagnosis/diagnoses of Moderate protein-calorie malnutrition.    This patient is being managed with:   Diet NPO with Tube Feed-  Tube Feeding Modality: Nasogastric  [employer] Peptide 1.5 (KFPEPT1.5RTH)  Total Volume for 24 Hours (mL): 840  Continuous  Starting Tube Feed Rate {mL per Hour}: 20  Increase Tube Feed Rate by (mL): 10     Every hour  Until Goal Tube Feed Rate (mL per Hour): 70  Tube Feed Duration (in Hours): 12  Tube Feed Start Time: 18:00  Tube Feed Stop Time: 06:00    Start Time: 23:00  Entered: Mar  3 2025  7:58AM  
This patient has been assessed with a concern for Malnutrition and has been determined to have a diagnosis/diagnoses of Moderate protein-calorie malnutrition.    This patient is being managed with:   Diet Regular-  Supplement Feeding Modality:  Oral  Nepro Cans or Servings Per Day:  1       Frequency:  Two Times a day  Entered: Mar 14 2025 11:28AM  
This patient has been assessed with a concern for Malnutrition and has been determined to have a diagnosis/diagnoses of Moderate protein-calorie malnutrition.    This patient is being managed with:   Diet NPO with Tube Feed-  Tube Feeding Modality: Nasogastric  A[employer] Peptide 1.5 (KFPEPT1.5RTH)  Total Volume for 24 Hours (mL): 840  Continuous  Starting Tube Feed Rate {mL per Hour}: 20  Increase Tube Feed Rate by (mL): 10     Every hour  Until Goal Tube Feed Rate (mL per Hour): 70  Tube Feed Duration (in Hours): 12  Tube Feed Start Time: 18:00  Tube Feed Stop Time: 06:00    Start Time: 23:00  Entered: Mar  3 2025  7:58AM  
This patient has been assessed with a concern for Malnutrition and has been determined to have a diagnosis/diagnoses of Moderate protein-calorie malnutrition.    This patient is being managed with:   Diet Regular-  Supplement Feeding Modality:  Oral  Nepro Cans or Servings Per Day:  1       Frequency:  Two Times a day  Entered: Mar 14 2025 11:28AM  
This patient has been assessed with a concern for Malnutrition and has been determined to have a diagnosis/diagnoses of Moderate protein-calorie malnutrition.    This patient is being managed with:   Diet Regular-  Supplement Feeding Modality:  Oral  Nepro Cans or Servings Per Day:  1       Frequency:  Two Times a day  Entered: Mar 14 2025 11:28AM

## 2025-03-20 ENCOUNTER — APPOINTMENT (OUTPATIENT)
Dept: CARE COORDINATION | Facility: HOME HEALTH | Age: 55
End: 2025-03-20
Payer: MEDICARE

## 2025-03-20 VITALS
HEART RATE: 67 BPM | OXYGEN SATURATION: 96 % | DIASTOLIC BLOOD PRESSURE: 60 MMHG | SYSTOLIC BLOOD PRESSURE: 132 MMHG | RESPIRATION RATE: 18 BRPM

## 2025-03-20 LAB — HBV SURFACE AB SER-ACNC: REACTIVE — SIGNIFICANT CHANGE UP

## 2025-03-20 PROCEDURE — 99024 POSTOP FOLLOW-UP VISIT: CPT

## 2025-03-20 RX ORDER — ASPIRIN 81 MG/1
81 TABLET ORAL DAILY
Qty: 30 | Refills: 0 | Status: ACTIVE | COMMUNITY

## 2025-03-20 RX ORDER — SODIUM ZIRCONIUM CYCLOSILICATE 10 G/10G
10 POWDER, FOR SUSPENSION ORAL
Refills: 0 | Status: ACTIVE | COMMUNITY

## 2025-03-20 RX ORDER — APIXABAN 2.5 MG/1
2.5 TABLET, FILM COATED ORAL
Qty: 60 | Refills: 0 | Status: ACTIVE | COMMUNITY

## 2025-03-20 RX ORDER — ATORVASTATIN CALCIUM 80 MG/1
80 TABLET, FILM COATED ORAL
Qty: 30 | Refills: 0 | Status: ACTIVE | COMMUNITY

## 2025-03-20 RX ORDER — ACETAMINOPHEN 325 MG/1
TABLET ORAL EVERY 6 HOURS
Refills: 0 | Status: ACTIVE | COMMUNITY

## 2025-03-20 RX ORDER — MIRTAZAPINE 7.5 MG/1
7.5 TABLET, FILM COATED ORAL
Refills: 0 | Status: ACTIVE | COMMUNITY

## 2025-03-20 RX ORDER — PANTOPRAZOLE SODIUM 40 MG/1
40 TABLET, DELAYED RELEASE ORAL DAILY
Qty: 30 | Refills: 0 | Status: ACTIVE | COMMUNITY

## 2025-03-20 RX ORDER — METOPROLOL TARTRATE 25 MG/1
25 TABLET ORAL
Qty: 60 | Refills: 0 | Status: ACTIVE | COMMUNITY

## 2025-03-20 RX ORDER — OXYCODONE 5 MG/1
5 TABLET ORAL EVERY 6 HOURS
Qty: 20 | Refills: 0 | Status: ACTIVE | COMMUNITY

## 2025-03-25 PROBLEM — Z95.1 S/P CABG X 3: Status: ACTIVE | Noted: 2025-03-20

## 2025-03-25 NOTE — CHART NOTE - NSCHARTNOTESELECT_GEN_ALL_CORE
Nutrition Services
Speech & Swallow
Vascular Surgery
chronic pain service/Event Note
post op check
Event Note
Hepatology/Event Note
Nutrition Services
sacral wound sign off
Post-Discharge Note

## 2025-03-25 NOTE — CHART NOTE - NSCHARTNOTEFT_GEN_A_CORE
Post-Discharge Medication Review: Completed	  	  Patient's preferred pharmacy was updated in OMR: [facility] 	  	  Patient contacted to offer medication counseling post-discharge. Medication reconciliation completed. Per patient, medications include:	  	  1.	apixaban 2.5 mg oral tablet 1 tab(s) orally 2 times a day  2.	aspirin 81 mg oral tablet, chewable 1 tab(s) orally once a day  3.	atorvastatin 80 mg oral tablet 1 tab(s) orally once a day (at bedtime)  4.	Lokelma 10 g oral powder for reconstitution 1 packet(s) orally 3 times a week take on tuesdays, thursdays and saturdays  5.	metoprolol tartrate 25 mg oral tablet 1 tab(s) orally 2 times a day  6.	mirtazapine 7.5 mg oral tablet 1 tab(s) orally once a day (at bedtime)  7.	pantoprazole 40 mg oral delayed release tablet 1 tab(s) orally once a day (before a meal)  8.	Emani-Lisette oral tablet 1 tab(s) orally once a day  9.	traZODone 100 mg oral tablet 1 tab(s) orally once a day (at bedtime)  10.	Tylenol 325 mg oral tablet 2 tab(s) orally every 6 hours as needed for mild pain   	  Medications removed from OMR (updated per discussion with patient):	  1.	oxyCODONE 5 mg oral tablet 1 tab(s) orally every 6 hours as needed for moderate pain MDD: 4   	  Medication name, indication, administration, side effect, and monitoring reviewed for new medications during post discharge counseling visit with patient. Patient demonstrated understanding. Counseling offered for all medications.	  	  Patient states taking ibuprofen for pain since discharge; advised patient to utilize acetaminophen instead due to increase risk of bleeding with apixaban and ibuprofen. Provided patient with phone number to schedule follow up appointment with cardiology. 	  	  Belinda Turner, Tania	  Clinical Pharmacy Specialist, Pharmacy Telehealth Team	  Can be reached via MS Teams or [phone]

## 2025-03-26 ENCOUNTER — APPOINTMENT (OUTPATIENT)
Dept: CARDIOTHORACIC SURGERY | Facility: CLINIC | Age: 55
End: 2025-03-26

## 2025-03-27 ENCOUNTER — APPOINTMENT (OUTPATIENT)
Dept: CARDIOTHORACIC SURGERY | Facility: CLINIC | Age: 55
End: 2025-03-27

## 2025-03-31 ENCOUNTER — INPATIENT (INPATIENT)
Facility: HOSPITAL | Age: 55
LOS: 1 days | Discharge: HOME | DRG: 641 | End: 2025-04-02
Attending: THORACIC SURGERY (CARDIOTHORACIC VASCULAR SURGERY) | Admitting: THORACIC SURGERY (CARDIOTHORACIC VASCULAR SURGERY)
Payer: MEDICARE

## 2025-03-31 VITALS
OXYGEN SATURATION: 99 % | HEIGHT: 72 IN | DIASTOLIC BLOOD PRESSURE: 88 MMHG | WEIGHT: 175.93 LBS | HEART RATE: 125 BPM | RESPIRATION RATE: 24 BRPM | TEMPERATURE: 98 F | SYSTOLIC BLOOD PRESSURE: 142 MMHG

## 2025-03-31 DIAGNOSIS — E87.70 FLUID OVERLOAD, UNSPECIFIED: ICD-10-CM

## 2025-03-31 DIAGNOSIS — I77.0 ARTERIOVENOUS FISTULA, ACQUIRED: Chronic | ICD-10-CM

## 2025-03-31 DIAGNOSIS — Z98.890 OTHER SPECIFIED POSTPROCEDURAL STATES: Chronic | ICD-10-CM

## 2025-03-31 DIAGNOSIS — Z90.49 ACQUIRED ABSENCE OF OTHER SPECIFIED PARTS OF DIGESTIVE TRACT: Chronic | ICD-10-CM

## 2025-03-31 LAB
ALBUMIN SERPL ELPH-MCNC: 3.6 G/DL — SIGNIFICANT CHANGE UP (ref 3.3–5)
ALP SERPL-CCNC: 118 U/L — SIGNIFICANT CHANGE UP (ref 40–120)
ALT FLD-CCNC: 18 U/L — SIGNIFICANT CHANGE UP (ref 10–45)
ANION GAP SERPL CALC-SCNC: 18 MMOL/L — HIGH (ref 5–17)
APTT BLD: 37.4 SEC — HIGH (ref 24.5–35.6)
AST SERPL-CCNC: 30 U/L — SIGNIFICANT CHANGE UP (ref 10–40)
BASOPHILS # BLD AUTO: 0.05 K/UL — SIGNIFICANT CHANGE UP (ref 0–0.2)
BASOPHILS NFR BLD AUTO: 0.6 % — SIGNIFICANT CHANGE UP (ref 0–2)
BILIRUB SERPL-MCNC: 0.5 MG/DL — SIGNIFICANT CHANGE UP (ref 0.2–1.2)
BUN SERPL-MCNC: 83 MG/DL — HIGH (ref 7–23)
CALCIUM SERPL-MCNC: 9.3 MG/DL — SIGNIFICANT CHANGE UP (ref 8.4–10.5)
CHLORIDE SERPL-SCNC: 95 MMOL/L — LOW (ref 96–108)
CK MB CFR SERPL CALC: 19.1 NG/ML — HIGH (ref 0–6.7)
CO2 SERPL-SCNC: 24 MMOL/L — SIGNIFICANT CHANGE UP (ref 22–31)
CREAT SERPL-MCNC: 5.94 MG/DL — HIGH (ref 0.5–1.3)
EGFR: 10 ML/MIN/1.73M2 — LOW
EGFR: 10 ML/MIN/1.73M2 — LOW
EOSINOPHIL # BLD AUTO: 0.39 K/UL — SIGNIFICANT CHANGE UP (ref 0–0.5)
EOSINOPHIL NFR BLD AUTO: 4.9 % — SIGNIFICANT CHANGE UP (ref 0–6)
GAS PNL BLDV: SIGNIFICANT CHANGE UP
GLUCOSE SERPL-MCNC: 130 MG/DL — HIGH (ref 70–99)
HCT VFR BLD CALC: 30.1 % — LOW (ref 39–50)
HGB BLD-MCNC: 9.3 G/DL — LOW (ref 13–17)
IMM GRANULOCYTES NFR BLD AUTO: 1.3 % — HIGH (ref 0–0.9)
INR BLD: 1.66 RATIO — HIGH (ref 0.85–1.16)
LYMPHOCYTES # BLD AUTO: 1.15 K/UL — SIGNIFICANT CHANGE UP (ref 1–3.3)
LYMPHOCYTES # BLD AUTO: 14.6 % — SIGNIFICANT CHANGE UP (ref 13–44)
MCHC RBC-ENTMCNC: 30.9 G/DL — LOW (ref 32–36)
MCHC RBC-ENTMCNC: 31.1 PG — SIGNIFICANT CHANGE UP (ref 27–34)
MCV RBC AUTO: 100.7 FL — HIGH (ref 80–100)
MONOCYTES # BLD AUTO: 0.58 K/UL — SIGNIFICANT CHANGE UP (ref 0–0.9)
MONOCYTES NFR BLD AUTO: 7.3 % — SIGNIFICANT CHANGE UP (ref 2–14)
NEUTROPHILS # BLD AUTO: 5.63 K/UL — SIGNIFICANT CHANGE UP (ref 1.8–7.4)
NEUTROPHILS NFR BLD AUTO: 71.3 % — SIGNIFICANT CHANGE UP (ref 43–77)
NRBC BLD AUTO-RTO: 0 /100 WBCS — SIGNIFICANT CHANGE UP (ref 0–0)
NT-PROBNP SERPL-SCNC: HIGH PG/ML (ref 0–300)
PLATELET # BLD AUTO: 252 K/UL — SIGNIFICANT CHANGE UP (ref 150–400)
POTASSIUM SERPL-MCNC: 6 MMOL/L — HIGH (ref 3.5–5.3)
POTASSIUM SERPL-SCNC: 6 MMOL/L — HIGH (ref 3.5–5.3)
PROT SERPL-MCNC: 8.1 G/DL — SIGNIFICANT CHANGE UP (ref 6–8.3)
PROTHROM AB SERPL-ACNC: 19 SEC — HIGH (ref 9.9–13.4)
RBC # BLD: 2.99 M/UL — LOW (ref 4.2–5.8)
RBC # FLD: 17 % — HIGH (ref 10.3–14.5)
SODIUM SERPL-SCNC: 137 MMOL/L — SIGNIFICANT CHANGE UP (ref 135–145)
TROPONIN T, HIGH SENSITIVITY RESULT: 616 NG/L — HIGH (ref 0–51)
WBC # BLD: 7.9 K/UL — SIGNIFICANT CHANGE UP (ref 3.8–10.5)
WBC # FLD AUTO: 7.9 K/UL — SIGNIFICANT CHANGE UP (ref 3.8–10.5)

## 2025-03-31 PROCEDURE — 93010 ELECTROCARDIOGRAM REPORT: CPT

## 2025-03-31 PROCEDURE — 71045 X-RAY EXAM CHEST 1 VIEW: CPT | Mod: 26

## 2025-03-31 PROCEDURE — 99285 EMERGENCY DEPT VISIT HI MDM: CPT

## 2025-03-31 RX ORDER — ACETAMINOPHEN 500 MG/5ML
650 LIQUID (ML) ORAL ONCE
Refills: 0 | Status: COMPLETED | OUTPATIENT
Start: 2025-03-31 | End: 2025-03-31

## 2025-03-31 RX ORDER — LIDOCAINE AND PRILOCAINE 25; 25 MG/G; MG/G
1 CREAM TOPICAL
Refills: 0 | Status: DISCONTINUED | OUTPATIENT
Start: 2025-03-31 | End: 2025-04-02

## 2025-03-31 RX ORDER — SODIUM ZIRCONIUM CYCLOSILICATE 5 G/5G
10 POWDER, FOR SUSPENSION ORAL ONCE
Refills: 0 | Status: COMPLETED | OUTPATIENT
Start: 2025-03-31 | End: 2025-03-31

## 2025-03-31 RX ORDER — ATORVASTATIN CALCIUM 80 MG/1
80 TABLET, FILM COATED ORAL AT BEDTIME
Refills: 0 | Status: DISCONTINUED | OUTPATIENT
Start: 2025-03-31 | End: 2025-04-02

## 2025-03-31 RX ORDER — TRAZODONE HCL 100 MG
100 TABLET ORAL AT BEDTIME
Refills: 0 | Status: DISCONTINUED | OUTPATIENT
Start: 2025-03-31 | End: 2025-04-02

## 2025-03-31 RX ORDER — CALCIUM GLUCONATE 20 MG/ML
2 INJECTION, SOLUTION INTRAVENOUS ONCE
Refills: 0 | Status: COMPLETED | OUTPATIENT
Start: 2025-03-31 | End: 2025-03-31

## 2025-03-31 RX ORDER — METOPROLOL SUCCINATE 50 MG/1
25 TABLET, EXTENDED RELEASE ORAL EVERY 12 HOURS
Refills: 0 | Status: DISCONTINUED | OUTPATIENT
Start: 2025-03-31 | End: 2025-04-01

## 2025-03-31 RX ORDER — DEXTROSE 50 % IN WATER 50 %
50 SYRINGE (ML) INTRAVENOUS ONCE
Refills: 0 | Status: COMPLETED | OUTPATIENT
Start: 2025-03-31 | End: 2025-03-31

## 2025-03-31 RX ADMIN — Medication 50 MILLILITER(S): at 21:24

## 2025-03-31 RX ADMIN — SODIUM ZIRCONIUM CYCLOSILICATE 10 GRAM(S): 5 POWDER, FOR SUSPENSION ORAL at 21:37

## 2025-03-31 RX ADMIN — Medication 5 UNIT(S): at 21:24

## 2025-03-31 RX ADMIN — CALCIUM GLUCONATE 200 GRAM(S): 20 INJECTION, SOLUTION INTRAVENOUS at 21:31

## 2025-03-31 NOTE — ED ADULT NURSE REASSESSMENT NOTE - NS ED NURSE REASSESS COMMENT FT1
At this time, pt is requesting IV access not be obtained now. Pt states he brought his own numbing cream and would like to put that on prior to IV insertion.

## 2025-03-31 NOTE — ED PROVIDER NOTE - CLINICAL SUMMARY MEDICAL DECISION MAKING FREE TEXT BOX
53 y/o male with PMHx of HTN, HLD, ESRD on HD MWF via LUE AVF, ascites (requiring repeat paracenteses q4-6wks), NICM with moderate LV dysfunction w/ EF 35-40%() and CAD s/p atherectomy + SALLIE to D1(2024) recently long admission 2024-3/2025 for AHRF 2/2 fluid overload, underwent CABG w/ Dr alvares c/c/b acute heart failure req IABP and , presents today from HD for abd discomfort and tachycardia. 53 y/o male with PMHx of HTN, HLD, ESRD on HD MWF via LUE AVF, ascites (requiring repeat paracenteses q4-6wks), NICM with moderate LV dysfunction w/ EF 35-40%() and CAD s/p atherectomy + SALLIE to D1(2024) recently long admission 2024-3/2025 for AHRF 2/2 fluid overload, underwent CABG w/ Dr alvares c/c/b acute heart failure req IABP and , presents today from HD for abd discomfort and tachycardia . Reports sig orthopnea, difficulty lying flat due to SOB. Unable to undergo HD today due to SOB and tahycardia. Denies fever chills,Cp.   VS 110s, EKG afib rate 98., no acute ischemic changes. Exam as above   C/f fluid overload, ACS, udnerlying infection. Eval labs, CXR, Bedside POCUS TTE. CTS and nephro conslt. TBA

## 2025-03-31 NOTE — ED ADULT NURSE NOTE - OBJECTIVE STATEMENT
54y Male AOx4 with ESRD HD MWF, DM, HTN presents to the ED c/o SOB. Pt reports he is schedule for paracentesis tomorrow but has been having worsening SOB. Endorses WEBSTER and worsening SOB with laying down, states he feels the need to gasp for air intermittently. States he was due to dialysis today but did not make his appointment.  Placed on cardiac monitor. Denies N/V, fever/chills, SOB, chest pain. Spontaneous/unlabored respirations, speaking in full sentences. Side rails up, bed in lowest position, safety maintained.

## 2025-03-31 NOTE — ED PROCEDURE NOTE - PROCEDURE ADDITIONAL DETAILS
POCUS: Emergency Department Focused Ultrasound performed at patient's bedside.  The complete report will be available in PACS.     Moderate to severe global LV systolic function. Moderate b/l pleural effusions. Partially imaged intra abd free fluid consistent with ascites POCUS: Emergency Department Focused Ultrasound performed at patient's bedside.  The complete report will be available in PACS.     No pericardial effusion. Moderate to severe global LV systolic function. Moderate b/l pleural effusions. Partially imaged intra abd free fluid consistent with ascites

## 2025-03-31 NOTE — ED CLERICAL - NS ED CLERK NOTE PRE-ARRIVAL INFORMATION; ADDITIONAL PRE-ARRIVAL INFORMATION
This patient is enrolled in the Follow Your Heart program and has undergone a cardiac surgery procedure within the last 30 days and has active care navigation.   This patient can be followed up by the care navigation team within 24 hours. To arrange close follow-up or to obtain additional clinical information about this patient, please call the contact number above.   Please call the cardiac surgery team once patient is registered at [phone] for consultation PRIOR to disposition decision.  The patient recently underwent a cardiac surgery procedure and the team can assist in acute medical management.

## 2025-03-31 NOTE — ED PROVIDER NOTE - PROGRESS NOTE DETAILS
ALEX Howard MD PGY-2: Bedside POCUS showed moderately reduced LVEF, cardiothoracic surgery consulted.  Patient missed HD today will need HD for fluid removal, private nephrology consulted. ALEX Howard MD PGY-2: Bedside POCUS showed moderately reduced LVEF, note last TTE 2/18/2025 w/ normal LVEF, cardiothoracic surgery consulted.  Patient missed HD today will need HD for fluid removal, private nephrology consulted.

## 2025-03-31 NOTE — CONSULT NOTE ADULT - ASSESSMENT
55 yo M w/ PMH of ESRD on MWF schedule last HD 3/28, HTN, Anemia, DM2, hyperkalemia presents to the ED with SOB, ascites, tachycardia. Nephrology consulted for dialysis needs.    A/P  ESRD  Center: [facility]  Nephrologist Dr. Bailey  Access: LUE AVF  MWF schedule  Last hd in center 3/28  Consent obtained, witnessed, placed in dialysis unit  HD today   Renal Diet  Monitor bmp     HTN  resume home meds  UF w/ hd as tolerated  monitor bp     Anemia  check cbc  CRISTIANE if hb <11  transfuse to keep hb >8  monitor hb     CKD-MBD  check pth  monitor ca, phos daily     SOB  care as per primary team   53 yo M w/ PMH of ESRD on MWF schedule last HD 3/28, HTN, Anemia, DM2, hyperkalemia presents to the ED with SOB, ascites, tachycardia. Nephrology consulted for dialysis needs.    A/P  ESRD  Center: [facility]  Nephrologist Dr. Bailey  Access: LUE AVF  MWF schedule  Last hd in center 3/28  Consent obtained, witnessed, placed in dialysis unit  HD today   Renal Diet  Monitor bmp     HTN  resume home meds  UF w/ hd as tolerated  monitor bp     Anemia  check cbc  CRISTIANE if hb <11  transfuse to keep hb >8  monitor hb     CKD-MBD  check pth  monitor ca, phos daily     SOB  clinically fluid ovrloaded  HD today  rest care as per primary team

## 2025-03-31 NOTE — ED PROVIDER NOTE - PHYSICAL EXAMINATION
Vital signs reviewed.  CONSTITUTIONAL: uncomfortable appearing   HEAD: Normocephalic; atraumatic  EYES: EOMI, PERRL, no conjunctival injection, no scleral icterus  MOUTH/THROAT:  MMM  NECK: Trachea midline   CV: Normal S1, S2; no audible murmurs  RESP: normal work of breathing; CTAB   ABD: distended, diffuse TTP   : Deferred  MSK/EXT: 2+ pitting edema b/l LE   SKIN: No rashes on exposed skin surfaces  NEURO: Moves all extremities spontaneously with no focal deficits, speech is appropriate  PSYCH: interactive

## 2025-03-31 NOTE — ED ADULT TRIAGE NOTE - CHIEF COMPLAINT QUOTE
SOB, weakness and ascites. Has appt tomorrow for paracentesis however is now gasping for air and having palpitations due to how uncomfortable he is from the fluid. Pt sent from HD because  so they were unable to do HD today. L AVF. Wound vac in place from pressure ulcer in back. 93% RA.

## 2025-03-31 NOTE — ED ADULT NURSE NOTE - NSFALLUNIVINTERV_ED_ALL_ED
Bed/Stretcher in lowest position, wheels locked, appropriate side rails in place/Call bell, personal items and telephone in reach/Instruct patient to call for assistance before getting out of bed/chair/stretcher/Non-slip footwear applied when patient is off stretcher/[place] to call system/Physically safe environment - no spills, clutter or unnecessary equipment/Purposeful proactive rounding/Room/bathroom lighting operational, light cord in reach

## 2025-03-31 NOTE — CONSULT NOTE ADULT - NS ATTEND AMEND GEN_ALL_CORE FT
HD today due to fluid overload  K also is high-planned for HD As soon as patient is in CTU-d/w HD unit and CTU

## 2025-03-31 NOTE — ED PROVIDER NOTE - ATTENDING CONTRIBUTION TO CARE
Attending MD Alfaro:  I have seen and examined this patient and fully participated in the care of this patient as the teaching attending. I personally made/approved the management plan and take responsibility for the patient management.      Mr. Torres presents to the emergency department with worsening shortness of breath and generalized weakness. He reports that he was discharged from this hospital two weeks ago and was able to ambulate well at that time. However, over the last few days, he has experienced significant decline in his functional status, now having difficulty walking even short distances such as to the kitchen. He also reports difficulty getting up from the toilet seat. When lying down, he experiences episodes of severe dyspnea after about 20 minutes, causing him to suddenly wake up gasping for air with a pounding heart that he describes as shaking his body. He denies chest pain associated with these episodes. Mr. Torres reports poor sleep due to these symptoms, only able to sleep for 30-minute intervals before needing to sit up and catch his breath. He states he has about 5 liters of fluid in his abdomen and had a scheduled appointment for paracentesis tomorrow. He denies fever or chills. He reports bright red blood on the toilet paper when wiping after bowel movements but denies black stools. Mr. Torres is on dialysis and states his last session was on Friday (03/28/2025). He was unable to receive dialysis today due to tachycardia. He believes his current medications may be contributing to his symptoms, stating "I got a funny feeling the medication's beating the shit out of me." Mr. Torres reports a history of left lung collapse in the past, which required multiple bronchoscopies.     Primary Care Doctor: Not provided    Cardiologist: Not provided    Oncologist: Not provided    Past Medical History:  - Heart failure  - CAD s/p CABG  - Chronic kidney disease on hemodialysis  - History of left lung collapse  - Hernia    Allergies:    Medication History (incomplete):  - Aspirin 81 mg daily  - Apixaban (Eliquis) 5 mg (dose frequency not specified)    Review of Systems:  - Constitutional: Denies fever, chills. Reports generalized weakness and fatigue.  - Cardiovascular: Reports palpitations. Denies chest pain.  - Respiratory: Reports severe shortness of breath, especially when lying down. History of left lung collapse with recurrent pleural effusions requiring bronchoscopies.  - Gastrointestinal: Reports ascites, estimates 5 liters of fluid in abdomen. Denies nausea or vomiting.  - Genitourinary: Reports decreased urine output, "maybe less than an hour and less once a day."  - Musculoskeletal: Reports difficulty ambulating and getting up from toilet seat due to weakness.  - Integumentary: Reports appearing pale.  - Hematologic: Reports bright red blood on toilet paper after bowel movements.    Patient's vital signs are notable for heart rate 113 otherwise nonactionable.  The patient is sitting at the edge of the stretcher chronically ill-appearing fatigued pale appearing.  On 3 L nasal cannula, appears slightly tachypneic with movements but at rest is not tachypneic.  Extremities are warm and well-perfused.  Nonpitting edema bilateral shins present.  He healed median sternotomy scar.  Heart sounds irregularly irregular.    ECG recorded at 1821 independently interpreted by me , Dr Derek Alfaro,  at 1917 shows atrial fibrillation rate 98 left axis deviation incomplete right bundle branch block Q waves in the inferior leads as well as anterior leads no sidney ST elevation or ST depression.  T wave inversions lead I and lead aVL.    ED POCUS reveals moderate to severe LV systolic dysfunction bilateral pleural effusions plethoric IVC, partially imaged intra-abdominal ascites.    Patient is presenting for evaluation of exertional shortness of breath and fatigue.  Considerations in this patient include but are not fully limited to, acute systolic heart failure, symptomatic anemia, pneumonia, volume overload from renal dysfunction.  Plan will be for lab work chest film telemetry CT surgery consultation given patient well known to their service and just discharged from their service    *The above represents an initial assessment/impression. Please refer to progress notes for potential changes in patient clinical course*

## 2025-04-01 ENCOUNTER — RESULT REVIEW (OUTPATIENT)
Age: 55
End: 2025-04-01

## 2025-04-01 DIAGNOSIS — I10 ESSENTIAL (PRIMARY) HYPERTENSION: ICD-10-CM

## 2025-04-01 DIAGNOSIS — Z95.1 PRESENCE OF AORTOCORONARY BYPASS GRAFT: ICD-10-CM

## 2025-04-01 DIAGNOSIS — I48.91 UNSPECIFIED ATRIAL FIBRILLATION: ICD-10-CM

## 2025-04-01 DIAGNOSIS — N18.6 END STAGE RENAL DISEASE: ICD-10-CM

## 2025-04-01 DIAGNOSIS — R18.8 OTHER ASCITES: ICD-10-CM

## 2025-04-01 DIAGNOSIS — E11.9 TYPE 2 DIABETES MELLITUS WITHOUT COMPLICATIONS: ICD-10-CM

## 2025-04-01 LAB
ALBUMIN SERPL ELPH-MCNC: 3.2 G/DL — LOW (ref 3.3–5)
ALBUMIN SERPL ELPH-MCNC: 3.3 G/DL — SIGNIFICANT CHANGE UP (ref 3.3–5)
ALP SERPL-CCNC: 107 U/L — SIGNIFICANT CHANGE UP (ref 40–120)
ALP SERPL-CCNC: 111 U/L — SIGNIFICANT CHANGE UP (ref 40–120)
ALT FLD-CCNC: 15 U/L — SIGNIFICANT CHANGE UP (ref 10–45)
ALT FLD-CCNC: 17 U/L — SIGNIFICANT CHANGE UP (ref 10–45)
ANION GAP SERPL CALC-SCNC: 14 MMOL/L — SIGNIFICANT CHANGE UP (ref 5–17)
ANION GAP SERPL CALC-SCNC: 18 MMOL/L — HIGH (ref 5–17)
AST SERPL-CCNC: 27 U/L — SIGNIFICANT CHANGE UP (ref 10–40)
AST SERPL-CCNC: 29 U/L — SIGNIFICANT CHANGE UP (ref 10–40)
BILIRUB SERPL-MCNC: 0.4 MG/DL — SIGNIFICANT CHANGE UP (ref 0.2–1.2)
BILIRUB SERPL-MCNC: 0.4 MG/DL — SIGNIFICANT CHANGE UP (ref 0.2–1.2)
BUN SERPL-MCNC: 68 MG/DL — HIGH (ref 7–23)
BUN SERPL-MCNC: 89 MG/DL — HIGH (ref 7–23)
CALCIUM SERPL-MCNC: 9 MG/DL — SIGNIFICANT CHANGE UP (ref 8.4–10.5)
CALCIUM SERPL-MCNC: 9.1 MG/DL — SIGNIFICANT CHANGE UP (ref 8.4–10.5)
CHLORIDE SERPL-SCNC: 95 MMOL/L — LOW (ref 96–108)
CHLORIDE SERPL-SCNC: 96 MMOL/L — SIGNIFICANT CHANGE UP (ref 96–108)
CK MB BLD-MCNC: 12.2 % — HIGH (ref 0–3.5)
CK MB CFR SERPL CALC: 17.5 NG/ML — HIGH (ref 0–6.7)
CK SERPL-CCNC: 143 U/L — SIGNIFICANT CHANGE UP (ref 30–200)
CO2 SERPL-SCNC: 23 MMOL/L — SIGNIFICANT CHANGE UP (ref 22–31)
CO2 SERPL-SCNC: 27 MMOL/L — SIGNIFICANT CHANGE UP (ref 22–31)
CREAT SERPL-MCNC: 4.89 MG/DL — HIGH (ref 0.5–1.3)
CREAT SERPL-MCNC: 6.13 MG/DL — HIGH (ref 0.5–1.3)
EGFR: 10 ML/MIN/1.73M2 — LOW
EGFR: 10 ML/MIN/1.73M2 — LOW
EGFR: 13 ML/MIN/1.73M2 — LOW
EGFR: 13 ML/MIN/1.73M2 — LOW
GLUCOSE SERPL-MCNC: 109 MG/DL — HIGH (ref 70–99)
GLUCOSE SERPL-MCNC: 130 MG/DL — HIGH (ref 70–99)
HCT VFR BLD CALC: 26.5 % — LOW (ref 39–50)
HCT VFR BLD CALC: 29.3 % — LOW (ref 39–50)
HGB BLD-MCNC: 8.4 G/DL — LOW (ref 13–17)
HGB BLD-MCNC: 9.1 G/DL — LOW (ref 13–17)
MCHC RBC-ENTMCNC: 31.1 G/DL — LOW (ref 32–36)
MCHC RBC-ENTMCNC: 31.1 PG — SIGNIFICANT CHANGE UP (ref 27–34)
MCHC RBC-ENTMCNC: 31.3 PG — SIGNIFICANT CHANGE UP (ref 27–34)
MCHC RBC-ENTMCNC: 31.7 G/DL — LOW (ref 32–36)
MCV RBC AUTO: 100.7 FL — HIGH (ref 80–100)
MCV RBC AUTO: 98.1 FL — SIGNIFICANT CHANGE UP (ref 80–100)
NRBC BLD AUTO-RTO: 0 /100 WBCS — SIGNIFICANT CHANGE UP (ref 0–0)
NRBC BLD AUTO-RTO: 0 /100 WBCS — SIGNIFICANT CHANGE UP (ref 0–0)
PLATELET # BLD AUTO: 231 K/UL — SIGNIFICANT CHANGE UP (ref 150–400)
PLATELET # BLD AUTO: 247 K/UL — SIGNIFICANT CHANGE UP (ref 150–400)
POTASSIUM SERPL-MCNC: 4.8 MMOL/L — SIGNIFICANT CHANGE UP (ref 3.5–5.3)
POTASSIUM SERPL-MCNC: 5.6 MMOL/L — HIGH (ref 3.5–5.3)
POTASSIUM SERPL-SCNC: 4.8 MMOL/L — SIGNIFICANT CHANGE UP (ref 3.5–5.3)
POTASSIUM SERPL-SCNC: 5.6 MMOL/L — HIGH (ref 3.5–5.3)
PROT SERPL-MCNC: 7.4 G/DL — SIGNIFICANT CHANGE UP (ref 6–8.3)
PROT SERPL-MCNC: 7.6 G/DL — SIGNIFICANT CHANGE UP (ref 6–8.3)
RBC # BLD: 2.7 M/UL — LOW (ref 4.2–5.8)
RBC # BLD: 2.91 M/UL — LOW (ref 4.2–5.8)
RBC # FLD: 16.9 % — HIGH (ref 10.3–14.5)
RBC # FLD: 17.1 % — HIGH (ref 10.3–14.5)
SODIUM SERPL-SCNC: 136 MMOL/L — SIGNIFICANT CHANGE UP (ref 135–145)
SODIUM SERPL-SCNC: 137 MMOL/L — SIGNIFICANT CHANGE UP (ref 135–145)
TROPONIN T, HIGH SENSITIVITY RESULT: 590 NG/L — HIGH (ref 0–51)
WBC # BLD: 7.03 K/UL — SIGNIFICANT CHANGE UP (ref 3.8–10.5)
WBC # BLD: 7.73 K/UL — SIGNIFICANT CHANGE UP (ref 3.8–10.5)
WBC # FLD AUTO: 7.03 K/UL — SIGNIFICANT CHANGE UP (ref 3.8–10.5)
WBC # FLD AUTO: 7.73 K/UL — SIGNIFICANT CHANGE UP (ref 3.8–10.5)

## 2025-04-01 PROCEDURE — 93308 TTE F-UP OR LMTD: CPT | Mod: 26

## 2025-04-01 PROCEDURE — 93306 TTE W/DOPPLER COMPLETE: CPT | Mod: 26

## 2025-04-01 PROCEDURE — 76705 ECHO EXAM OF ABDOMEN: CPT | Mod: 26

## 2025-04-01 PROCEDURE — 99232 SBSQ HOSP IP/OBS MODERATE 35: CPT | Mod: FS

## 2025-04-01 PROCEDURE — 74018 RADEX ABDOMEN 1 VIEW: CPT | Mod: 26

## 2025-04-01 RX ORDER — INSULIN LISPRO 100 U/ML
INJECTION, SOLUTION INTRAVENOUS; SUBCUTANEOUS
Refills: 0 | Status: DISCONTINUED | OUTPATIENT
Start: 2025-04-01 | End: 2025-04-02

## 2025-04-01 RX ORDER — METOPROLOL SUCCINATE 50 MG/1
25 TABLET, EXTENDED RELEASE ORAL EVERY 12 HOURS
Refills: 0 | Status: DISCONTINUED | OUTPATIENT
Start: 2025-04-01 | End: 2025-04-01

## 2025-04-01 RX ORDER — MIRTAZAPINE 30 MG/1
7.5 TABLET, FILM COATED ORAL AT BEDTIME
Refills: 0 | Status: DISCONTINUED | OUTPATIENT
Start: 2025-04-01 | End: 2025-04-02

## 2025-04-01 RX ORDER — EPOETIN ALFA 10000 [IU]/ML
10000 SOLUTION INTRAVENOUS; SUBCUTANEOUS
Refills: 0 | Status: DISCONTINUED | OUTPATIENT
Start: 2025-04-02 | End: 2025-04-02

## 2025-04-01 RX ORDER — INSULIN LISPRO 100 U/ML
INJECTION, SOLUTION INTRAVENOUS; SUBCUTANEOUS AT BEDTIME
Refills: 0 | Status: DISCONTINUED | OUTPATIENT
Start: 2025-04-01 | End: 2025-04-02

## 2025-04-01 RX ORDER — OXYCODONE HYDROCHLORIDE 30 MG/1
5 TABLET ORAL EVERY 6 HOURS
Refills: 0 | Status: DISCONTINUED | OUTPATIENT
Start: 2025-04-01 | End: 2025-04-02

## 2025-04-01 RX ORDER — METOPROLOL SUCCINATE 50 MG/1
50 TABLET, EXTENDED RELEASE ORAL
Refills: 0 | Status: DISCONTINUED | OUTPATIENT
Start: 2025-04-01 | End: 2025-04-02

## 2025-04-01 RX ADMIN — OXYCODONE HYDROCHLORIDE 5 MILLIGRAM(S): 30 TABLET ORAL at 14:40

## 2025-04-01 RX ADMIN — OXYCODONE HYDROCHLORIDE 5 MILLIGRAM(S): 30 TABLET ORAL at 15:40

## 2025-04-01 RX ADMIN — OXYCODONE HYDROCHLORIDE 5 MILLIGRAM(S): 30 TABLET ORAL at 02:09

## 2025-04-01 RX ADMIN — METOPROLOL SUCCINATE 25 MILLIGRAM(S): 50 TABLET, EXTENDED RELEASE ORAL at 10:10

## 2025-04-01 RX ADMIN — Medication 100 MILLIGRAM(S): at 21:20

## 2025-04-01 RX ADMIN — ATORVASTATIN CALCIUM 80 MILLIGRAM(S): 80 TABLET, FILM COATED ORAL at 21:21

## 2025-04-01 RX ADMIN — INSULIN LISPRO 1: 100 INJECTION, SOLUTION INTRAVENOUS; SUBCUTANEOUS at 16:29

## 2025-04-01 RX ADMIN — OXYCODONE HYDROCHLORIDE 5 MILLIGRAM(S): 30 TABLET ORAL at 08:31

## 2025-04-01 RX ADMIN — METOPROLOL SUCCINATE 25 MILLIGRAM(S): 50 TABLET, EXTENDED RELEASE ORAL at 02:09

## 2025-04-01 RX ADMIN — MIRTAZAPINE 7.5 MILLIGRAM(S): 30 TABLET, FILM COATED ORAL at 21:21

## 2025-04-01 RX ADMIN — METOPROLOL SUCCINATE 50 MILLIGRAM(S): 50 TABLET, EXTENDED RELEASE ORAL at 17:31

## 2025-04-01 RX ADMIN — OXYCODONE HYDROCHLORIDE 5 MILLIGRAM(S): 30 TABLET ORAL at 02:39

## 2025-04-01 RX ADMIN — ATORVASTATIN CALCIUM 80 MILLIGRAM(S): 80 TABLET, FILM COATED ORAL at 00:23

## 2025-04-01 RX ADMIN — OXYCODONE HYDROCHLORIDE 5 MILLIGRAM(S): 30 TABLET ORAL at 21:50

## 2025-04-01 RX ADMIN — OXYCODONE HYDROCHLORIDE 5 MILLIGRAM(S): 30 TABLET ORAL at 21:20

## 2025-04-01 RX ADMIN — OXYCODONE HYDROCHLORIDE 5 MILLIGRAM(S): 30 TABLET ORAL at 09:30

## 2025-04-01 NOTE — PROGRESS NOTE ADULT - PROBLEM SELECTOR PLAN 1
IR consulted for para  NPO @ [facility] for possible intervention   needs full TTE  Abd US ordered   Abd Xray ordered. IR consulted for para>planned for Wednesday  ECHO completed  PT changed VAC this afternoon

## 2025-04-01 NOTE — PHYSICAL THERAPY INITIAL EVALUATION ADULT - ADDITIONAL COMMENTS
Lives with spouse, 2 adult children and brother in a private house with 5 steps to enter. None inside to negotiate.

## 2025-04-01 NOTE — PHYSICAL THERAPY INITIAL EVALUATION ADULT - PERTINENT HX OF CURRENT PROBLEM, REHAB EVAL
Pt is a 53 y/o male adm to [facility] on 3/31/25 with PMHx of HTN, HLD, ESRD on HD MWF via LUE AVF, ascites (requiring repeat paracenteses q4-6wks), NICM with moderate LV dysfunction w/ EF 35-40%() and CAD s/p atherectomy + SALLIE to D1(2024) recently long admission 2024-3/2025 for AHRF 2/2 fluid overload, underwent CABG w/ Dr brown c/c/b acute heart failure req IABP and , presents today from HD for abd discomfort and tachycardia. Reports sig orthopnea, difficulty lying flat due to SOB. Unable to undergo HD today due to SOB and tahycardia. In the ED denies fever chills. EKG in the ED Afib rate 98, no acute ischemic changes. CTS consulted for further management given recent admission. Renal consulted in the ED  HD inpatient.   HC: CXR-Small bilateral effusions with bilateral lower lung field opacities which may represent atelectasis or pneumonia. POCUS-No pericardial effusion. Moderate to severe global LV systolic function. Moderate b/l pleural effusions. Partially imaged intra abd free fluid consistent with ascites. IR consulted for therapeutic paracentesis, scheduled for .

## 2025-04-01 NOTE — PROGRESS NOTE ADULT - PROBLEM SELECTOR PLAN 4
c/w ASA, Statin, BB  + sacral wound vac   pt/wound eval. c/w ASA, Statin, BB  Renal following  Pt refused UF today  Repeat labs this afternoon

## 2025-04-01 NOTE — PROGRESS NOTE ADULT - ASSESSMENT
53 y/o male with PMHx of HTN, HLD, ESRD on HD MWF via LUE AVF, ascites (requiring repeat paracenteses q4-6wks), NICM with moderate LV dysfunction w/ EF 35-40%() and CAD s/p atherectomy + SALLIE to D1(2024) recently long admission 2024-3/2025 for AHRF 2/2 fluid overload, underwent CABG w/ Dr brown c/c/b acute heart failure req IABP and , presents today from HD for abd discomfort and tachycardia. Reports sig orthopnea, difficulty lying flat due to SOB. Unable to undergo HD today due to SOB and tahycardia. In the ED denies fever chills. EKG in the ED Afib rate 98, no acute ischemic changes. CTS consulted for further management given recent admission. Renal consulted in the ED  HD inpatient.    55 y/o male with PMHx of HTN, HLD, ESRD on HD MWF via LUE AVF, ascites (requiring repeat paracenteses q4-6wks), NICM with moderate LV dysfunction w/ EF 35-40%() and CAD s/p atherectomy + SALLIE to D1(2024) recently long admission 2024-3/2025 for AHRF 2/2 fluid overload, underwent CABG w/ Dr kevin branham/c/b acute heart failure req IABP and , presents today from HD for abd discomfort and tachycardia. Reports sig orthopnea, difficulty lying flat due to SOB. Unable to undergo HD today due to SOB and tahycardia. In the ED denies fever chills. EKG in the ED Afib rate 98, no acute ischemic changes. CTS consulted for further management given recent admission. Renal consulted in the ED  HD inpatient. Emergent HD overnite   IR para for ascites scheduled for Wednesday> Pt refusing UF today as per Renal> will repeat labs  PT changed sacral VAC ECHO completed

## 2025-04-01 NOTE — PHYSICAL THERAPY INITIAL EVALUATION ADULT - MODALITIES TREATMENT COMMENTS
Pt with stage 4 pressure injury to sacrum measuring 5.0cmL x 1.5cmW x 2.0cmD; +undermining @ 12:00x1.25

## 2025-04-01 NOTE — PROGRESS NOTE ADULT - ASSESSMENT
55 yo M w/ PMH of ESRD on MWF schedule last HD 3/28, HTN, Anemia, DM2, hyperkalemia presents to the ED with SOB, ascites, tachycardia. Nephrology consulted for dialysis needs.    A/P  ESRD  [facility]: [facility]  Nephrologist Dr. Bailey  Access: LUE AVF  MWF schedule  Last hd in center 3/28  Consent obtained, witnessed, placed in dialysis unit  s/p HD last night UF 1.5L, patient cut tx short  Recommend for pUF today, patient refusing  HD tomorrow  Planned for paracentesis tomorrow  Renal Diet  Monitor bmp     HTN  bp fluctuating  continue current meds  UF w/ hd as tolerated  monitor bp     Anemia  hb below goal  CRISTIANE w/ HD  transfuse to keep hb >8  monitor hb     CKD-MBD  check pth  monitor ca, phos daily     SOB  clinically fluid ovrloaded  s/p HD last night; refusing pUF today  rest care as per primary team

## 2025-04-01 NOTE — H&P ADULT - PROBLEM SELECTOR PLAN 1
Renal Dr. Yousif consulted  HD scheduled for tonight   K+ 6 in the ED lokelma x1 given in the ED  f/u labs

## 2025-04-01 NOTE — PHYSICAL THERAPY INITIAL EVALUATION ADULT - DIAGNOSIS, PT EVAL
Pt with mobility impairments due to wkness, dec balance and dec'd functional endurance. Pt also with integumentary impairment of sacrum

## 2025-04-01 NOTE — H&P ADULT - PROBLEM SELECTOR PLAN 2
IR consulted for para  NPO @ [facility] for possible intervention   needs full TTE  Abd US ordered   Abd Xray ordered

## 2025-04-01 NOTE — H&P ADULT - HISTORY OF PRESENT ILLNESS
55 y/o male with PMHx of HTN, HLD, ESRD on HD MWF via LUE AVF, ascites (requiring repeat paracenteses q4-6wks), NICM with moderate LV dysfunction w/ EF 35-40%() and CAD s/p atherectomy + SALLIE to D1(2024) recently long admission 2024-3/2025 for AHRF 2/2 fluid overload, underwent CABG w/ Dr brown c/c/b acute heart failure req IABP and , presents today from HD for abd discomfort and tachycardia. Reports sig orthopnea, difficulty lying flat due to SOB. Unable to undergo HD today due to SOB and tahycardia. In the ED denies fever chills. EKG in the ED Afib rate 98, no acute ischemic changes. CTS consulted for further management given recent admission. Renal consulted in the ED  HD inpatient.

## 2025-04-01 NOTE — PROGRESS NOTE ADULT - SUBJECTIVE AND OBJECTIVE BOX
Subjective:    Tele:                              T(C): 36.7 (04-01-25 @ 03:57), Max: 36.8 (03-31-25 @ 22:58)  HR: 92 (04-01-25 @ 10:11) (87 - 125)  BP: 158/84 (04-01-25 @ 10:11) (142/88 - 163/98)  ABP: --  ABP(mean): --  RR: 17 (04-01-25 @ 03:57) (17 - 24)  SpO2: 100% (04-01-25 @ 03:57) (98% - 100%)  Wt(kg): --  CVP(mm Hg): --  CO: --  CI: --  PA: --    LVEF:       04-01    136  |  95[L]  |  89[H]  ----------------------------<  109[H]  5.6[H]   |  23  |  6.13[H]    Ca    9.1      01 Apr 2025 01:33    TPro  7.4  /  Alb  3.3  /  TBili  0.4  /  DBili  x   /  AST  29  /  ALT  15  /  AlkPhos  107  04-01                               8.4    7.73  )-----------( 231      ( 01 Apr 2025 01:33 )             26.5        PT/INR - ( 31 Mar 2025 20:20 )   PT: 19.0 sec;   INR: 1.66 ratio         PTT - ( 31 Mar 2025 20:20 )  PTT:37.4 sec    CAPILLARY BLOOD GLUCOSE      POCT Blood Glucose.: 113 mg/dL (01 Apr 2025 11:45)  POCT Blood Glucose.: 97 mg/dL (01 Apr 2025 07:50)  POCT Blood Glucose.: 187 mg/dL (31 Mar 2025 22:08)  POCT Blood Glucose.: 137 mg/dL (31 Mar 2025 21:27)           CXR:      Assessment    Neuro:     Pulm:     CV:    Abd:     Extremities:       MEDICATIONS  (STANDING):  atorvastatin 80 milliGRAM(s) Oral at bedtime  insulin lispro (ADMELOG) corrective regimen sliding scale   SubCutaneous three times a day before meals  insulin lispro (ADMELOG) corrective regimen sliding scale   SubCutaneous at bedtime  lidocaine/prilocaine Cream 1 Application(s) Topical <User Schedule>  metoprolol succinate ER 25 milliGRAM(s) Oral every 12 hours  mirtazapine 7.5 milliGRAM(s) Oral at bedtime  traZODone 100 milliGRAM(s) Oral at bedtime       PAST MEDICAL & SURGICAL HISTORY:  Type 2 diabetes mellitus      Hypertension      End stage renal disease  started HD 2/2019 T, Th, Sat via right chest permacath      Bone spur  right shoulder- hx of - sx done      Anemia      Injury of right wrist  hx of at age 14      History of hyperkalemia  before HD- K-6.2 - to repeat in am of sx, pt had HD today      S/P arthroscopy of right shoulder  2010      History of vascular access device  right chest permacath - 2/2019      H/O right wrist surgery  tendons repair - at age 14      AV fistula      H/O ventral hernia repair      S/P cholecystectomy               Subjective: "Hello"  OOB ambulating in room    Tele:      Af 70                          T(C): 36.7 (04-01-25 @ 03:57), Max: 36.8 (03-31-25 @ 22:58)  HR: 92 (04-01-25 @ 10:11) (87 - 125)  BP: 158/84 (04-01-25 @ 10:11) (142/88 - 163/98)  RR: 17 (04-01-25 @ 03:57) (17 - 24)  SpO2: 100% (04-01-25 @ 03:57) (98% - 100%)        04-01    136  |  95[L]  |  89[H]  ----------------------------<  109[H]  5.6[H]   |  23  |  6.13[H]    Ca    9.1      01 Apr 2025 01:33    TPro  7.4  /  Alb  3.3  /  TBili  0.4  /  DBili  x   /  AST  29  /  ALT  15  /  AlkPhos  107  04-01                               8.4    7.73  )-----------( 231      ( 01 Apr 2025 01:33 )             26.5        PT/INR - ( 31 Mar 2025 20:20 )   PT: 19.0 sec;   INR: 1.66 ratio         PTT - ( 31 Mar 2025 20:20 )  PTT:37.4 sec    CAPILLARY BLOOD GLUCOSE      POCT Blood Glucose.: 113 mg/dL (01 Apr 2025 11:45)  POCT Blood Glucose.: 97 mg/dL (01 Apr 2025 07:50)  POCT Blood Glucose.: 187 mg/dL (31 Mar 2025 22:08)  POCT Blood Glucose.: 137 mg/dL (31 Mar 2025 21:27)           CXR:      Assessment  General: NAD   Neurology: A&Ox3, NAD  CV : Irregular +S1S2  Lungs: Respirations non-labored, B/L BS clear  Abdomen: mild tenderness, moderate distension, +BSx4Q  Extremities: 2+ B/L LE edema, negative calf tenderness, +PP B/L, sacral home would vac in place.         MEDICATIONS  (STANDING):  atorvastatin 80 milliGRAM(s) Oral at bedtime  insulin lispro (ADMELOG) corrective regimen sliding scale   SubCutaneous three times a day before meals  insulin lispro (ADMELOG) corrective regimen sliding scale   SubCutaneous at bedtime  lidocaine/prilocaine Cream 1 Application(s) Topical <User Schedule>  metoprolol succinate ER 25 milliGRAM(s) Oral every 12 hours  mirtazapine 7.5 milliGRAM(s) Oral at bedtime  traZODone 100 milliGRAM(s) Oral at bedtime       PAST MEDICAL & SURGICAL HISTORY:  Type 2 diabetes mellitus      Hypertension      End stage renal disease  started HD 2/2019 T, Th, Sat via right chest permacath      Bone spur  right shoulder- hx of - sx done      Anemia      Injury of right wrist  hx of at age 14      History of hyperkalemia  before HD- K-6.2 - to repeat in am of sx, pt had HD today      S/P arthroscopy of right shoulder  2010      History of vascular access device  right chest permacath - 2/2019      H/O right wrist surgery  tendons repair - at age 14      AV fistula      H/O ventral hernia repair      S/P cholecystectomy

## 2025-04-01 NOTE — PHYSICAL THERAPY INITIAL EVALUATION ADULT - NAME OF DISCHARGE PLANNER
----- Message from William Khanna MD sent at 12/17/2020 12:09 PM CST -----  Labs are normal or normal/typical for a person with Down syndrome except Kidney function remains mildly impaired (common in people with Down syndrome) but stable.  Recommend optimizing kidney function by:  Avoiding use of NSAIDs - e.g. Ibuprofen, aspirin  Drinking plenty of fluids  Avoid/limit alcohol      
Spoke to mom and let her know of all annotations, mom understood and aware.   
Chela, CM

## 2025-04-01 NOTE — H&P ADULT - NSHPPHYSICALEXAM_GEN_ALL_CORE
PHYSICAL EXAM  General: NAD   HEENT:  NC/AT  Neurology: A&Ox3, NAD  CV : Irregular +S1S2  Lungs: Respirations non-labored, B/L BS clear  Abdomen: mild tenderness, moderate distension, +BSx4Q  Extremities: 2+ B/L LE edema, negative calf tenderness, +PP B/L   Musculoskeletal: B/L UE and LE 5/5 strength   Psychiatric: Normal mood, normal affect observed  Skin: Normal exam to inspection and palpation. no bleeding, no hematoma. PHYSICAL EXAM  General: NAD   HEENT:  NC/AT  Neurology: A&Ox3, NAD  CV : Irregular +S1S2  Lungs: Respirations non-labored, B/L BS clear  Abdomen: mild tenderness, moderate distension, +BSx4Q  Extremities: 2+ B/L LE edema, negative calf tenderness, +PP B/L, sacral home would vac in place.   Musculoskeletal: B/L UE and LE 5/5 strength   Psychiatric: Normal mood, normal affect observed  Skin: Normal exam to inspection and palpation. no bleeding, no hematoma.

## 2025-04-01 NOTE — PROGRESS NOTE ADULT - SUBJECTIVE AND OBJECTIVE BOX
[facility] [place] [facility]   MD DREW VIZCARRA MD MARIA SANTIAGO, NP    TEL:  OFFICE: [phone]  From 5pm-7am Answering Service [phone]    -- RENAL FOLLOW UP NOTE ---Date of Service 04-01-25 @ 11:57    Patient is a 54y old  Male who presents with a chief complaint of SOB.      Patient seen and examined at bedside. No chest pain/sob    VITALS:  T(F): 98.1 (04-01-25 @ 03:57), Max: 98.2 (03-31-25 @ 22:58)  HR: 92 (04-01-25 @ 10:11)  BP: 158/84 (04-01-25 @ 10:11)  RR: 17 (04-01-25 @ 03:57)  SpO2: 100% (04-01-25 @ 03:57)  Wt(kg): --    03-31 @ 07:01  -  04-01 @ 07:00  --------------------------------------------------------  IN: 120 mL / OUT: 1500 mL / NET: -1380 mL      Height (cm): 182.9 (03-31 @ 18:18)  Weight (kg): 79.8 (03-31 @ 18:18)  BMI (kg/m2): 23.9 (03-31 @ 18:18)  BSA (m2): 2.02 (03-31 @ 18:18)    PHYSICAL EXAM:  General: NAD  Neck: No JVD  Respiratory: +crackles  Cardiovascular: S1, S2, RRR  Gastrointestinal: distended  Extremities: b/l LE +2 edema    Hospital Medications:   MEDICATIONS  (STANDING):  atorvastatin 80 milliGRAM(s) Oral at bedtime  insulin lispro (ADMELOG) corrective regimen sliding scale   SubCutaneous three times a day before meals  insulin lispro (ADMELOG) corrective regimen sliding scale   SubCutaneous at bedtime  lidocaine/prilocaine Cream 1 Application(s) Topical <User Schedule>  metoprolol succinate ER 25 milliGRAM(s) Oral every 12 hours  mirtazapine 7.5 milliGRAM(s) Oral at bedtime  traZODone 100 milliGRAM(s) Oral at bedtime      LABS:  04-01    136  |  95[L]  |  89[H]  ----------------------------<  109[H]  5.6[H]   |  23  |  6.13[H]    Ca    9.1      01 Apr 2025 01:33    TPro  7.4  /  Alb  3.3  /  TBili  0.4  /  DBili      /  AST  29  /  ALT  15  /  AlkPhos  107  04-01    Creatinine Trend: 6.13 <--, 5.94 <--    Albumin: 3.3 g/dL (04-01 @ 01:33)  Albumin: 3.6 g/dL (03-31 @ 20:20)                              8.4    7.73  )-----------( 231      ( 01 Apr 2025 01:33 )             26.5     Urine Studies:  Urinalysis - [04-01-25 @ 01:33]      Color  / Appearance  / SG  / pH       Gluc 109 / Ketone   / Bili  / Urobili        Blood  / Protein  / Leuk Est  / Nitrite       RBC  / WBC  / Hyaline  / Gran  / Sq Epi  / Non Sq Epi  / Bacteria       Iron 29, TIBC 143, %sat 20      [12-19-24 @ 05:00]  Ferritin 904      [12-19-24 @ 05:00]  TSH 4.75      [12-24-24 @ 06:50]    HBsAb Reactive      [03-19-25 @ 10:01]  HBsAg Nonreact      [03-14-25 @ 15:50]  HBcAb Nonreact      [03-14-25 @ 15:50]  HCV 0.46, Nonreact      [03-14-25 @ 15:50]      RADIOLOGY & ADDITIONAL STUDIES:

## 2025-04-01 NOTE — H&P ADULT - NSHPLABSRESULTS_GEN_ALL_CORE
< from: Xray Chest 1 View- PORTABLE-Urgent (Xray Chest 1 View- PORTABLE-Urgent .) (03.31.25 @ 20:34) >    FINDINGS:  Sternotomy wires.  The heart is enlarged.  Right greater than left lower lung field opacities. Small bilateral   effusions.  There is no pneumothorax.  No acute displaced bony abnormality.    IMPRESSION:  Small bilateral effusions with bilateral lower lung field opacities which   may represent atelectasis or pneumonia.    < end of copied text >    POCUS TTE:   PROCEDURE:   · Procedure Name: Point of Care Ultrasound Cardiac  · Procedure Date/Time: 31-Mar-2025 19:12  · Procedure Performed By: Dr Alfaro  · Procedure was Supervised and/or Assisted?: The procedure was performed independently  · Procedure Additional Details: POCUS: Emergency Department Focused Ultrasound performed at patient's bedside.  The complete report will be available in PACS.     	No pericardial effusion. Moderate to severe global LV systolic function. Moderate b/l pleural effusions. Partially imaged intra abd free fluid consistent with ascites  · CPT Codes: 41524 Echocardiography Transthoracic with Image 2D (Echo/FAST)

## 2025-04-02 ENCOUNTER — TRANSCRIPTION ENCOUNTER (OUTPATIENT)
Age: 55
End: 2025-04-02

## 2025-04-02 ENCOUNTER — APPOINTMENT (OUTPATIENT)
Dept: CARDIOTHORACIC SURGERY | Facility: CLINIC | Age: 55
End: 2025-04-02

## 2025-04-02 ENCOUNTER — RESULT REVIEW (OUTPATIENT)
Age: 55
End: 2025-04-02

## 2025-04-02 VITALS — RESPIRATION RATE: 18 BRPM | OXYGEN SATURATION: 92 %

## 2025-04-02 DIAGNOSIS — Z95.1 PRESENCE OF AORTOCORONARY BYPASS GRAFT: ICD-10-CM

## 2025-04-02 LAB
ALBUMIN FLD-MCNC: 1.7 G/DL — SIGNIFICANT CHANGE UP
ALBUMIN SERPL ELPH-MCNC: 3 G/DL — LOW (ref 3.3–5)
ALP SERPL-CCNC: 102 U/L — SIGNIFICANT CHANGE UP (ref 40–120)
ALT FLD-CCNC: 14 U/L — SIGNIFICANT CHANGE UP (ref 10–45)
ANION GAP SERPL CALC-SCNC: 16 MMOL/L — SIGNIFICANT CHANGE UP (ref 5–17)
APTT BLD: 33.3 SEC — SIGNIFICANT CHANGE UP (ref 24.5–35.6)
AST SERPL-CCNC: 23 U/L — SIGNIFICANT CHANGE UP (ref 10–40)
B PERT IGG+IGM PNL SER: CLEAR — SIGNIFICANT CHANGE UP
BILIRUB SERPL-MCNC: 0.3 MG/DL — SIGNIFICANT CHANGE UP (ref 0.2–1.2)
BUN SERPL-MCNC: 75 MG/DL — HIGH (ref 7–23)
CALCIUM SERPL-MCNC: 8.6 MG/DL — SIGNIFICANT CHANGE UP (ref 8.4–10.5)
CHLORIDE SERPL-SCNC: 95 MMOL/L — LOW (ref 96–108)
CO2 SERPL-SCNC: 24 MMOL/L — SIGNIFICANT CHANGE UP (ref 22–31)
COLOR FLD: YELLOW
CREAT SERPL-MCNC: 5.48 MG/DL — HIGH (ref 0.5–1.3)
EGFR: 12 ML/MIN/1.73M2 — LOW
EGFR: 12 ML/MIN/1.73M2 — LOW
FLUID INTAKE SUBSTANCE CLASS: SIGNIFICANT CHANGE UP
GLUCOSE FLD-MCNC: 74 MG/DL — SIGNIFICANT CHANGE UP
GLUCOSE SERPL-MCNC: 54 MG/DL — CRITICAL LOW (ref 70–99)
GRAM STN FLD: SIGNIFICANT CHANGE UP
HCT VFR BLD CALC: 27.6 % — LOW (ref 39–50)
HGB BLD-MCNC: 8.8 G/DL — LOW (ref 13–17)
INR BLD: 1.47 RATIO — HIGH (ref 0.85–1.16)
LDH SERPL L TO P-CCNC: 106 U/L — SIGNIFICANT CHANGE UP
LYMPHOCYTES # FLD: 43 % — SIGNIFICANT CHANGE UP
MCHC RBC-ENTMCNC: 31.5 PG — SIGNIFICANT CHANGE UP (ref 27–34)
MCHC RBC-ENTMCNC: 31.9 G/DL — LOW (ref 32–36)
MCV RBC AUTO: 98.9 FL — SIGNIFICANT CHANGE UP (ref 80–100)
MESOTHL CELL # FLD: SIGNIFICANT CHANGE UP
MONOS+MACROS # FLD: 52 % — SIGNIFICANT CHANGE UP
NEUTROPHILS-BODY FLUID: 5 % — SIGNIFICANT CHANGE UP
NRBC BLD AUTO-RTO: 0 /100 WBCS — SIGNIFICANT CHANGE UP (ref 0–0)
PLATELET # BLD AUTO: 266 K/UL — SIGNIFICANT CHANGE UP (ref 150–400)
POTASSIUM SERPL-MCNC: 4.8 MMOL/L — SIGNIFICANT CHANGE UP (ref 3.5–5.3)
POTASSIUM SERPL-SCNC: 4.8 MMOL/L — SIGNIFICANT CHANGE UP (ref 3.5–5.3)
PROT FLD-MCNC: 3.5 G/DL — SIGNIFICANT CHANGE UP
PROT SERPL-MCNC: 7 G/DL — SIGNIFICANT CHANGE UP (ref 6–8.3)
PROTHROM AB SERPL-ACNC: 16.8 SEC — HIGH (ref 9.9–13.4)
RBC # BLD: 2.79 M/UL — LOW (ref 4.2–5.8)
RBC # FLD: 16.9 % — HIGH (ref 10.3–14.5)
RCV VOL RI: <2000 CELLS/UL — HIGH
SODIUM SERPL-SCNC: 135 MMOL/L — SIGNIFICANT CHANGE UP (ref 135–145)
SPECIMEN SOURCE: SIGNIFICANT CHANGE UP
TOTAL CELLS COUNTED, BODY FLUID: 336 CELLS — SIGNIFICANT CHANGE UP
TOTAL NUCLEATED CELL COUNT, BODY FLUID: 336 CELLS/UL — SIGNIFICANT CHANGE UP
TUBE TYPE: SIGNIFICANT CHANGE UP
WBC # BLD: 7.05 K/UL — SIGNIFICANT CHANGE UP (ref 3.8–10.5)
WBC # FLD AUTO: 7.05 K/UL — SIGNIFICANT CHANGE UP (ref 3.8–10.5)
WBC COUNT.: 309 CELLS/UL — SIGNIFICANT CHANGE UP

## 2025-04-02 PROCEDURE — 99232 SBSQ HOSP IP/OBS MODERATE 35: CPT | Mod: FS

## 2025-04-02 PROCEDURE — 49083 ABD PARACENTESIS W/IMAGING: CPT

## 2025-04-02 PROCEDURE — 88305 TISSUE EXAM BY PATHOLOGIST: CPT | Mod: 26

## 2025-04-02 PROCEDURE — 88112 CYTOPATH CELL ENHANCE TECH: CPT | Mod: 26

## 2025-04-02 RX ORDER — ACETAMINOPHEN 500 MG/5ML
650 LIQUID (ML) ORAL EVERY 6 HOURS
Refills: 0 | Status: DISCONTINUED | OUTPATIENT
Start: 2025-04-02 | End: 2025-04-02

## 2025-04-02 RX ORDER — ASPIRIN 325 MG
81 TABLET ORAL DAILY
Refills: 0 | Status: DISCONTINUED | OUTPATIENT
Start: 2025-04-02 | End: 2025-04-02

## 2025-04-02 RX ORDER — DEXTROSE 50 % IN WATER 50 %
50 SYRINGE (ML) INTRAVENOUS ONCE
Refills: 0 | Status: COMPLETED | OUTPATIENT
Start: 2025-04-02 | End: 2025-04-02

## 2025-04-02 RX ORDER — ACETAMINOPHEN 500 MG/5ML
2 LIQUID (ML) ORAL
Qty: 0 | Refills: 0 | DISCHARGE

## 2025-04-02 RX ORDER — METOPROLOL SUCCINATE 50 MG/1
25 TABLET, EXTENDED RELEASE ORAL ONCE
Refills: 0 | Status: COMPLETED | OUTPATIENT
Start: 2025-04-02 | End: 2025-04-02

## 2025-04-02 RX ORDER — APIXABAN 2.5 MG/1
2.5 TABLET, FILM COATED ORAL
Refills: 0 | Status: DISCONTINUED | OUTPATIENT
Start: 2025-04-03 | End: 2025-04-02

## 2025-04-02 RX ORDER — METOPROLOL SUCCINATE 50 MG/1
1 TABLET, EXTENDED RELEASE ORAL
Qty: 60 | Refills: 0
Start: 2025-04-02 | End: 2025-05-01

## 2025-04-02 RX ADMIN — METOPROLOL SUCCINATE 50 MILLIGRAM(S): 50 TABLET, EXTENDED RELEASE ORAL at 17:01

## 2025-04-02 RX ADMIN — OXYCODONE HYDROCHLORIDE 5 MILLIGRAM(S): 30 TABLET ORAL at 08:00

## 2025-04-02 RX ADMIN — Medication 50 MILLILITER(S): at 07:29

## 2025-04-02 RX ADMIN — Medication 650 MILLIGRAM(S): at 14:13

## 2025-04-02 RX ADMIN — EPOETIN ALFA 10000 UNIT(S): 10000 SOLUTION INTRAVENOUS; SUBCUTANEOUS at 13:08

## 2025-04-02 RX ADMIN — METOPROLOL SUCCINATE 25 MILLIGRAM(S): 50 TABLET, EXTENDED RELEASE ORAL at 06:25

## 2025-04-02 RX ADMIN — Medication 650 MILLIGRAM(S): at 15:00

## 2025-04-02 RX ADMIN — LIDOCAINE AND PRILOCAINE 1 APPLICATION(S): 25; 25 CREAM TOPICAL at 11:46

## 2025-04-02 RX ADMIN — METOPROLOL SUCCINATE 50 MILLIGRAM(S): 50 TABLET, EXTENDED RELEASE ORAL at 06:07

## 2025-04-02 RX ADMIN — OXYCODONE HYDROCHLORIDE 5 MILLIGRAM(S): 30 TABLET ORAL at 07:13

## 2025-04-02 NOTE — DISCHARGE NOTE PROVIDER - HOSPITAL COURSE
55 y/o male with PMHx of HTN, HLD, ESRD on HD MWF via LUE AVF, ascites (requiring repeat paracenteses q4-6wks), NICM with moderate LV dysfunction w/ EF 35-40%() and CAD s/p atherectomy + SALLIE to D1(2024) recently long admission 2024-3/2025 for AHRF 2/2 fluid overload, underwent CABG w/ Dr brown c/c/b acute heart failure req IABP and , presents today from HD for abd discomfort and tachycardia. Reports sig orthopnea, difficulty lying flat due to SOB. Unable to undergo HD today due to SOB and tahycardia. In the ED denies fever chills. EKG in the ED Afib rate 98, no acute ischemic changes. CTS consulted for further management given recent admission. Renal consulted in the ED  HD inpatient. Emergent HD overnite   IR para for ascites scheduled for Wednesday> Pt refusing UF today as per Renal> will repeat labs  PT changed sacral VAC ECHO completed   Para this am  3500 ml removed  Resume AC toMORROW AM   Structural eval for TR>Dr Parker aware  Renal following>completed HD this afternoon  Pt requests' to be discharged> cleared by Dr Tita Parker consulted /pt given contact info will follow up as OP

## 2025-04-02 NOTE — PROGRESS NOTE ADULT - SUBJECTIVE AND OBJECTIVE BOX
[facility]    Dr. Nica Styles     Office [phone] (9 am to 5 pm)  Service: [phone] (5pm to 9am)  Also Available on TEAMS      RENAL PROGRESS NOTE: DATE OF SERVICE 04-02-25 @ 13:38    Patient is a 54y old  Male who presents with a chief complaint of Fluid overload (02 Apr 2025 10:32)      Patient seen and examined at bedside. No chest pain/sob    VITALS:  T(F): 97.6 (04-02-25 @ 12:45), Max: 98.3 (04-02-25 @ 00:08)  HR: 99 (04-02-25 @ 12:45)  BP: 146/96 (04-02-25 @ 12:45)  RR: 18 (04-02-25 @ 12:45)  SpO2: 96% (04-02-25 @ 12:45)  Wt(kg): --    04-01 @ 07:01  -  04-02 @ 07:00  --------------------------------------------------------  IN: 480 mL / OUT: 0 mL / NET: 480 mL    04-02 @ 07:01  -  04-02 @ 13:38  --------------------------------------------------------  IN: 300 mL / OUT: 0 mL / NET: 300 mL          PHYSICAL EXAM:  Constitutional: NAD  Neck: No JVD  Respiratory: CTAB, no wheezes, rales or rhonchi  Cardiovascular: S1, S2, RRR  Gastrointestinal: BS+, soft, NT/ND  Extremities: No peripheral edema    Hospital Medications:   MEDICATIONS  (STANDING):  atorvastatin 80 milliGRAM(s) Oral at bedtime  epoetin ignacia (EPOGEN) Injectable 15801 Unit(s) IV Push <User Schedule>  insulin lispro (ADMELOG) corrective regimen sliding scale   SubCutaneous three times a day before meals  insulin lispro (ADMELOG) corrective regimen sliding scale   SubCutaneous at bedtime  lidocaine/prilocaine Cream 1 Application(s) Topical <User Schedule>  metoprolol tartrate 50 milliGRAM(s) Oral two times a day  mirtazapine 7.5 milliGRAM(s) Oral at bedtime  traZODone 100 milliGRAM(s) Oral at bedtime      LABS:  04-02    135  |  95[L]  |  75[H]  ----------------------------<  54[LL]  4.8   |  24  |  5.48[H]    Ca    8.6      02 Apr 2025 06:42    TPro  7.0  /  Alb  3.0[L]  /  TBili  0.3  /  DBili      /  AST  23  /  ALT  14  /  AlkPhos  102  04-02    Creatinine Trend: 5.48 <--, 4.89 <--, 6.13 <--, 5.94 <--    Albumin: 3.0 g/dL (04-02 @ 06:42)  Albumin: 3.2 g/dL (04-01 @ 14:47)                              8.8    7.05  )-----------( 266      ( 02 Apr 2025 06:43 )             27.6     Urine Studies:  Urinalysis - [04-02-25 @ 06:42]      Color  / Appearance  / SG  / pH       Gluc 54 / Ketone   / Bili  / Urobili        Blood  / Protein  / Leuk Est  / Nitrite       RBC  / WBC  / Hyaline  / Gran  / Sq Epi  / Non Sq Epi  / Bacteria       Iron 29, TIBC 143, %sat 20      [12-19-24 @ 05:00]  Ferritin 904      [12-19-24 @ 05:00]  TSH 4.75      [12-24-24 @ 06:50]    HBsAb Reactive      [03-19-25 @ 10:01]  HBsAg Nonreact      [03-14-25 @ 15:50]  HBcAb Nonreact      [03-14-25 @ 15:50]  HCV 0.46, Nonreact      [03-14-25 @ 15:50]      RADIOLOGY & ADDITIONAL STUDIES:

## 2025-04-02 NOTE — PROGRESS NOTE ADULT - PROBLEM SELECTOR PLAN 5
: c/w lopressor 50mg Q12  Eliquis Afib : c/w lopressor 50mg Q12  Eliquis Afib resume tonite : c/w lopressor 50mg Q12  Eliquis Afib resume tomorrow

## 2025-04-02 NOTE — PROCEDURE NOTE - PROCEDURE FINDINGS AND DETAILS
Successful paracentesis via LLQ site. Site anesthetized, 6 fr simon michelle introduced under US guidance, 3500cc clear yellow fluid drained to completion. Patient tolerate procedure well. Full report to follow.

## 2025-04-02 NOTE — PROGRESS NOTE ADULT - ASSESSMENT
55 y/o male with PMHx of HTN, HLD, ESRD on HD MWF via LUE AVF, ascites (requiring repeat paracenteses q4-6wks), NICM with moderate LV dysfunction w/ EF 35-40%() and CAD s/p atherectomy + SALLIE to D1(2024) recently long admission 2024-3/2025 for AHRF 2/2 fluid overload, underwent CABG w/ Dr kevin branham/c/b acute heart failure req IABP and , presents today from HD for abd discomfort and tachycardia. Reports sig orthopnea, difficulty lying flat due to SOB. Unable to undergo HD today due to SOB and tahycardia. In the ED denies fever chills. EKG in the ED Afib rate 98, no acute ischemic changes. CTS consulted for further management given recent admission. Renal consulted in the ED  HD inpatient. Emergent HD overnite   IR para for ascites scheduled for Wednesday> Pt refusing UF today as per Renal> will repeat labs  PT changed sacral VAC ECHO completed   Para this am  Resume AVC tonite  Structural eval for TR>Dr Parker aware   53 y/o male with PMHx of HTN, HLD, ESRD on HD MWF via LUE AVF, ascites (requiring repeat paracenteses q4-6wks), NICM with moderate LV dysfunction w/ EF 35-40%() and CAD s/p atherectomy + SALLIE to D1(2024) recently long admission 2024-3/2025 for AHRF 2/2 fluid overload, underwent CABG w/ Dr kevin branham/c/b acute heart failure req IABP and , presents today from HD for abd discomfort and tachycardia. Reports sig orthopnea, difficulty lying flat due to SOB. Unable to undergo HD today due to SOB and tahycardia. In the ED denies fever chills. EKG in the ED Afib rate 98, no acute ischemic changes. CTS consulted for further management given recent admission. Renal consulted in the ED  HD inpatient. Emergent HD overnite   IR para for ascites scheduled for Wednesday> Pt refusing UF today as per Renal> will repeat labs  PT changed sacral VAC ECHO completed   Para this am  3500 ml removed  Resume AVC tonite  Structural eval for TR>Dr Parker aware  Renal following>HD this afternoon   55 y/o male with PMHx of HTN, HLD, ESRD on HD MWF via LUE AVF, ascites (requiring repeat paracenteses q4-6wks), NICM with moderate LV dysfunction w/ EF 35-40%() and CAD s/p atherectomy + SALLIE to D1(2024) recently long admission 2024-3/2025 for AHRF 2/2 fluid overload, underwent CABG w/ Dr kevin branham/c/b acute heart failure req IABP and , presents today from HD for abd discomfort and tachycardia. Reports sig orthopnea, difficulty lying flat due to SOB. Unable to undergo HD today due to SOB and tahycardia. In the ED denies fever chills. EKG in the ED Afib rate 98, no acute ischemic changes. CTS consulted for further management given recent admission. Renal consulted in the ED  HD inpatient. Emergent HD overnite   IR para for ascites scheduled for Wednesday> Pt refusing UF today as per Renal> will repeat labs  PT changed sacral VAC ECHO completed   Para this am  3500 ml removed  Resume AC toMORROW AM   Structural eval for TR>Dr Parker aware  Renal following>HD this afternoon

## 2025-04-02 NOTE — PROGRESS NOTE ADULT - PROBLEM SELECTOR PLAN 2
ISS  monitor finger sticks TID with meals and QHS.
ISS  monitor finger sticks TID with meals and QHS.

## 2025-04-02 NOTE — DISCHARGE NOTE NURSING/CASE MANAGEMENT/SOCIAL WORK - NSDCVIVACCINE_GEN_ALL_CORE_FT
Tdap; 14-Dec-2021 19:18; Roxi Lewis (MARIA ELENA); [employer]; [id number] (Exp. Date: 09-Sep-2023); IntraMuscular; Deltoid Right.; 0.5 milliLiter(s); VIS (VIS Published: 09-May-2013, VIS Presented: 14-Dec-2021);

## 2025-04-02 NOTE — DISCHARGE NOTE PROVIDER - NSDCMRMEDTOKEN_GEN_ALL_CORE_FT
apixaban 2.5 mg oral tablet: 1 tab(s) orally 2 times a day  aspirin 81 mg oral tablet, chewable: 1 tab(s) orally once a day  atorvastatin 80 mg oral tablet: 1 tab(s) orally once a day (at bedtime)  metoprolol tartrate 50 mg oral tablet: 1 tab(s) orally 2 times a day  mirtazapine 7.5 mg oral tablet: 1 tab(s) orally once a day (at bedtime)  pantoprazole 40 mg oral delayed release tablet: 1 tab(s) orally once a day (before a meal)  Emani-Lisette oral tablet: 1 tab(s) orally once a day  traZODone 100 mg oral tablet: 1 tab(s) orally once a day (at bedtime)

## 2025-04-02 NOTE — PROGRESS NOTE ADULT - PROBLEM SELECTOR PLAN 1
IR para this am  ECHO completed  PT changed VAC this afternoon IR para this am> 3500 ml removed  ECHO completed  PT changed VAC 4/1

## 2025-04-02 NOTE — DISCHARGE NOTE PROVIDER - NSDCPNSUBOBJ_GEN_ALL_CORE
Progress Note:   · Provider Specialty  CT Surgery    Reason for Admission:   Reason for Admission:  · Reason for Admission  Fluid overload      · Subjective and Objective:     Subjective:  ?My stomach hurt, that procedure hurts"  Pt ambulating    Tele:     AF 90s                           T(C): 36.8 (25 @ 04:00), Max: 36.8 (25 @ 00:08)  HR: 118 (25 @ 04:00) (78 - 129)  BP: 159/94 (25 @ 06:00) (135/81 - 159/94)  RR: 18 (25 @ 04:00) (18 - 18)  SpO2: 95% (25 @ 04:00) (92% - 95%)            135  |  95[L]  |  75[H]  ----------------------------<  54[LL]  4.8   |  24  |  5.48[H]    Ca    8.6      2025 06:42    TPro  7.0  /  Alb  3.0[L]  /  TBili  0.3  /  DBili  x   /  AST  23  /  ALT  14  /  AlkPhos  102                                 8.8    7.05  )-----------( 266      ( 2025 06:43 )             27.6        PT/INR - ( 2025 06:43 )   PT: 16.8 sec;   INR: 1.47 ratio         PTT - ( 2025 06:43 )  PTT:33.3 sec    CAPILLARY BLOOD GLUCOSE      POCT Blood Glucose.: 117 mg/dL (2025 07:49)  POCT Blood Glucose.: 57 mg/dL (2025 07:20)  POCT Blood Glucose.: 61 mg/dL (2025 07:19)  POCT Blood Glucose.: 106 mg/dL (2025 21:22)  POCT Blood Glucose.: 170 mg/dL (2025 16:26)  POCT Blood Glucose.: 113 mg/dL (2025 11:45)           CXR:      Assessment    General: NAD   Neurology: A&Ox3, NAD  CV : Irregular +S1S2  Lungs: Respirations non-labored, B/L BS clear  Abdomen: mild tenderness, Softer, less distended, +BSx4Q  Extremities: 2+ B/L LE edema, negative calf tenderness, +PP B/L, sacral  wound vac in place.         MEDICATIONS  (STANDING):  atorvastatin 80 milliGRAM(s) Oral at bedtime  epoetin ignacia (EPOGEN) Injectable 50027 Unit(s) IV Push <User Schedule>  insulin lispro (ADMELOG) corrective regimen sliding scale   SubCutaneous three times a day before meals  insulin lispro (ADMELOG) corrective regimen sliding scale   SubCutaneous at bedtime  lidocaine/prilocaine Cream 1 Application(s) Topical <User Schedule>  metoprolol tartrate 50 milliGRAM(s) Oral two times a day  mirtazapine 7.5 milliGRAM(s) Oral at bedtime  traZODone 100 milliGRAM(s) Oral at bedtime       PAST MEDICAL & SURGICAL HISTORY:  Type 2 diabetes mellitus      Hypertension      End stage renal disease  started HD 2019 T, Th, Sat via right chest permacath      Bone spur  right shoulder- hx of - sx done      Anemia      Injury of right wrist  hx of at age 14      History of hyperkalemia  before HD- K-6.2 - to repeat in am of sx, pt had HD today      S/P arthroscopy of right shoulder        History of vascular access device  right chest permacath - 2019      H/O right wrist surgery  tendons repair - at age 14      AV fistula      H/O ventral hernia repair      S/P cholecystectomy        ECHO < from: TTE W or WO Ultrasound Enhancing Agent (25 @ 13:20) >  CONCLUSIONS:      1. Left ventricular systolic function is moderately decreased with an ejection fraction of 42 % by Michaels's method of disks.   2. Basal and mid inferior septum and basal and mid inferior wall are abnormal.   3. Enlarged right ventricular cavity size and reduced right ventricular systolic function.   4. The right atrium is dilated.   5. Mild to moderate mitral regurgitation.   6. Mild aortic regurgitation.   7. Estimated pulmonary artery systolic pressure is 52 mmHg, consistent with moderate pulmonary hypertension.   8. Severe tricuspid regurgitation. Systolic flow reversal in the hepatic vein supports the finding of severe tricuspid regurgitation.   9. The inferior vena cava is dilated measuring 2.20 cm in diameter, (dilated >2.1cm) with normal inspiratory collapse (normal >50%) consistent with mildly elevated right atrial pressure (~8, range 5-10mmHg).  10. Compared to the transthoracic echocardiogram performed on 2025, there have been no significant interval changes however the valvular function was not evaluated on that study.    < end of copied text >            Assessment and Plan:   · Assessment    53 y/o male with PMHx of HTN, HLD, ESRD on HD MWF via LUE AVF, ascites (requiring repeat paracenteses q4-6wks), NICM with moderate LV dysfunction w/ EF 35-40%() and CAD s/p atherectomy + SALLIE to D1(2024) recently long admission 2024-3/2025 for AHRF 2/2 fluid overload, underwent CABG w/ Dr brown c/c/b acute heart failure req IABP and , presents today from HD for abd discomfort and tachycardia. Reports sig orthopnea, difficulty lying flat due to SOB. Unable to undergo HD today due to SOB and tahycardia. In the ED denies fever chills. EKG in the ED Afib rate 98, no acute ischemic changes. CTS consulted for further management given recent admission. Renal consulted in the ED  HD inpatient. Emergent HD overnite   IR para for ascites scheduled for Wednesday> Pt refusing UF today as per Renal> will repeat labs  PT changed sacral VAC ECHO completed   Para this am  3500 ml removed  Resume AC toMORROW AM   Structural eval for TR>Dr Parker aware  Renal following>HD this afternoon        Problem/Plan - 1:  ·  Problem: Ascites.   ·  Plan: IR para this am> 3500 ml removed  ECHO completed  PT changed VAC .    Problem/Plan - 2:  ·  Problem: Type 2 diabetes mellitus.   ·  Plan: ISS  monitor finger sticks TID with meals and QHS.    Problem/Plan - 3:  ·  Problem: Hypertension.   ·  Plan: c/w lopressor 50mg Q12.    Problem/Plan - 4:  ·  Problem: End stage renal disease.   ·  Plan: c/w ASA, Statin, BB  Renal following  Pt refused UF yesterday  HD M/W/F  plan for HD this afternoon.    Problem/Plan - 5:  ·  Problem: Afib.   ·  Plan: : c/w lopressor 50mg Q12  Eliquis Afib resume tomorrow.      Problem/Plan - 6:  ·  Problem: S/P CABG (coronary artery bypass graft).   ·  Plan: c/w ASA, Statin, BB> increased rate control.      Electronic Signatures:  Belinda Ruff (NP)  (Signed 2025 10:51)  	Authored: Progress Note, Reason for Admission, Subjective and Objective, Assessment and Plan      Last Updated: 2025 10:51 by Belinda Ruff ([employer])

## 2025-04-02 NOTE — CONSULT NOTE ADULT - SUBJECTIVE AND OBJECTIVE BOX
Interventional Radiology    Evaluate for Procedure: Paracentesis    HPI: 53 y/o male with PMHx of HTN, HLD, ESRD on HD MWF via LUE AVF, ascites (requiring repeat paracenteses q4-6wks), NICM with moderate LV dysfunction w/ EF 35-40%() and CAD s/p atherectomy + SALLIE to D1(2024) recently long admission 2024-3/2025 for AHRF 2/2 fluid overload, underwent CABG w/ Dr kevin branham/carrol/chantale acute heart failure req IABP and , presents today from HD for abd discomfort and tachycardia. Reports sig orthopnea, difficulty lying flat due to SOB.    IR consulted for therapeutic paracentesis.    Allergies: No Known Allergies    Medications (Abx/Cardiac/Anticoagulation/Blood Products)    metoprolol tartrate: 25 milliGRAM(s) Oral ( @ 02:09)    Data:  182.9  79.8  T(C): 36.7  HR: 87  BP: 157/70  RR: 17  SpO2: 100%    -WBC 7.73 / HgB 8.4 / Hct 26.5 / Plt 231  -Na 136 / Cl 95 / BUN 89 / Glucose 109  -K 5.6 / CO2 23 / Cr 6.13  -ALT 15 / Alk Phos 107 / T.Bili 0.4  -INR 1.66 / PTT 37.4    Radiology: Pending US    Assessment/Plan: 53 y/o male with PMHx of HTN, HLD, ESRD on HD MWF via LUE AVF, ascites (requiring repeat paracenteses q4-6wks), NICM with moderate LV dysfunction w/ EF 35-40%() and CAD s/p atherectomy + SALLIE to D1(2024) recently long admission 2024-3/2025 for AHRF 2/2 fluid overload, underwent CABG w/ Dr kevin branham/carrol/chantale acute heart failure req IABP and , presents today from HD for abd discomfort and tachycardia. Reports sig orthopnea, difficulty lying flat due to SOB.    IR consulted for therapeutic paracentesis.    - given that patient underwent HD today, will plan for large volume paracentesis on 25 pending ultrasound of abdomen (if not ordered, please obtain imaging)  - please place IR procedure order under MY[facility]  - STAT labs in AM (cbc,coags, bmp, T&S)  - please continue to hold Eliquis. May resume 24 hours unless stated in procedure.  - does not need to be NPO  - d/w primary team
[facility]   MD DREW VIZCARRA MD MARIA SANTIAGO, NP        TEL:  OFFICE: [phone]  From 5pm-7am answering service [phone]    --- INITIAL RENAL CONSULT NOTE ---date of service 03-31-25 @ 19:07    HPI:  55 yo M w/ PMH of ESRD on MWF schedule last HD 3/28, HTN, Anemia, DM2, hyperkalemia presents to the ED with SOB, ascites, tachycardia. Nephrology consulted for dialysis needs.      Allergies:  No Known Allergies      PAST MEDICAL & SURGICAL HISTORY:  Type 2 diabetes mellitus      Hypertension      End stage renal disease      Bone spur  right shoulder- hx of - sx done      Anemia      Injury of right wrist  hx of at age 14      History of hyperkalemia  before HD- K-6.2 - to repeat in am of sx, pt had HD today      S/P arthroscopy of right shoulder  2010      History of vascular access device  right chest permacath - 2/2019      H/O right wrist surgery  tendons repair - at age 14      AV fistula      H/O ventral hernia repair      S/P cholecystectomy          Home Medications Reviewed    Hospital Medications:   MEDICATIONS  (STANDING):      SOCIAL HISTORY:  Denies ETOh, Smoking    FAMILY HISTORY:  FH: hypertension  mother, father- alive    FH: type 2 diabetes  mother, father- alive        REVIEW OF SYSTEMS:  CONSTITUTIONAL: No weakness, fevers or chills  EYES/ENT: No visual changes;  No vertigo or throat pain   NECK: No pain or stiffness  RESPIRATORY: as per hpi   CARDIOVASCULAR: as per hpi   GASTROINTESTINAL: as per hpi   GENITOURINARY: No dysuria, frequency, foamy urine, urinary urgency, incontinence or hematuria  NEUROLOGICAL: No numbness or weakness  SKIN: back wound w/ wound vac  VASCULAR: b/l LE edema  All other review of systems is negative unless indicated above.    VITALS:  T(F): 97.9 (03-31-25 @ 18:55), Max: 98 (03-31-25 @ 18:18)  HR: 113 (03-31-25 @ 18:55)  BP: 145/95 (03-31-25 @ 18:55)  RR: 22 (03-31-25 @ 18:55)  SpO2: 98% (03-31-25 @ 18:55)  Wt(kg): --    Height (cm): 182.9 (03-31 @ 18:18)  Weight (kg): 79.8 (03-31 @ 18:18)  BMI (kg/m2): 23.9 (03-31 @ 18:18)  BSA (m2): 2.02 (03-31 @ 18:18)    PHYSICAL EXAM:  General: NAD  HEENT: anicteric sclera, oropharynx clear, MMM  Neck: No JVD  Respiratory: diminished; on nasal cannula  Cardiovascular: S1, S2, RRR; tachycardic  Gastrointestinal: distended  Extremities: b/l LE +2 edema  Neurological: A/O x 3, no focal deficits  Psychiatric: Normal mood, normal affect  : No CVA tenderness. No blunt.   Skin: No rashes  Vascular Access: L AVF    LABS:        Creatinine Trend:     Urine Studies:        RADIOLOGY & ADDITIONAL STUDIES:                
MR:- [id number] :  NAME:SANDRA GONZALESWARD:-    DATE OF SERVICE:25 @ 06:19    Patient was seen,examined and evaluated  by Solo Quick MD qi04-24-10 @ 06:19 .  ER evaluation, Labs and Hospital course was reviewed,    CHIEF COMPLAINT:Ascites    HPI:HPI:  53 y/o male with PMHx of HTN, HLD, ESRD on HD MWF via LUE AVF, ascites (requiring repeat paracenteses q4-6wks), NICM with moderate LV dysfunction w/ EF 35-40%() and CAD s/p atherectomy + SALLIE to D1(2024) recently long admission 2024-3/2025 for AHRF 2/2 fluid overload, underwent CABG w/ Dr kevin branham/carrol/b acute heart failure req IABP and , presents today from HD for abd discomfort and tachycardia. Reports sig orthopnea, difficulty lying flat due to SOB. Unable to undergo HD today due to SOB and tahycardia. In the ED denies fever chills. EKG in the ED Afib rate 98, no acute ischemic changes. CTS consulted for further management given recent admission. Renal consulted in the ED  HD inpatient.  (2025 01:26)        CARDIAC HISTORY:  [ x] CAD [ [PCI [ x] CABG [ ] Prior Cath  [x ] Atrial Fibrillation  Devices[ ] PPM [ ] ICD [ ]ILR  Heart Failure [ ] HFrEF [x ] HFpEF    PAST MEDICAL & SURGICAL HISTORY:  Type 2 diabetes mellitus      Hypertension      End stage renal disease  started HD 2019 T, Th, Sat via right chest permacath      Bone spur  right shoulder- hx of - sx done      Anemia      Injury of right wrist  hx of at age 14      History of hyperkalemia  before HD- K-6.2 - to repeat in am of sx, pt had HD today      S/P arthroscopy of right shoulder        History of vascular access device  right chest permacath - 2019      H/O right wrist surgery  tendons repair - at age 14      AV fistula      H/O ventral hernia repair      S/P cholecystectomy          MEDICATIONS  (STANDING):  atorvastatin 80 milliGRAM(s) Oral at bedtime  epoetin ignacia (EPOGEN) Injectable 87792 Unit(s) IV Push <User Schedule>  insulin lispro (ADMELOG) corrective regimen sliding scale   SubCutaneous three times a day before meals  insulin lispro (ADMELOG) corrective regimen sliding scale   SubCutaneous at bedtime  lidocaine/prilocaine Cream 1 Application(s) Topical <User Schedule>  metoprolol tartrate 50 milliGRAM(s) Oral two times a day  metoprolol tartrate 25 milliGRAM(s) Oral once  mirtazapine 7.5 milliGRAM(s) Oral at bedtime  traZODone 100 milliGRAM(s) Oral at bedtime    MEDICATIONS  (PRN):  oxyCODONE    IR 5 milliGRAM(s) Oral every 6 hours PRN Moderate Pain (4 - 6)      FAMILY HISTORY:  FH: hypertension  mother, father- alive    FH: type 2 diabetes  mother, father- alive      No family history of premature coronary artery disease or sudden cardiac death    SOCIAL HISTORY:  Smoking-[ ] Active  [x ] Former [ ] Non Smoker  Alcohol-[x ] Denies [ ] Social [ ] Daily  Ilicit Drug use-[ x] Denies [ ] Active user    REVIEW OF SYSTEMS:  Constitutional: [ ] fever, [ ]weight loss, [ x]fatigue   Activity [ ] Bedbound,[ ] Ambulates [ ] Unassisted[ ] Cane/Walker [ ] Assistence.  Effort tolerance:[ ] Excellent [ ] Good [ ] Fair [ ] Poor [ ]  Eyes: [ ] visual changes  Respiratory: [ ]shortness of breath;  [ ] cough, [ ]wheezing, [ ]chills, [ ]hemoptysis  Cardiovascular: [ ] chest pain, [ ]palpitations, [ ]dizziness,  [ ]leg swelling[ ]orthopnea [ ]PND  Gastrointestinal: [ ] abdominal pain, [ ]nausea, [ ]vomiting,  [ ]diarrhea,[ ]constipation  Genitourinary: [ ] dysuria, [ ] hematuria  Neurologic: [ ] headaches [ ] tremors[ ] weakness  Skin: [ ] itching, [ ]burning, [ ] rashes  Endocrine: [ ] heat or cold intolerance  Musculoskeletal: [ ] joint pain or swelling; [ ] muscle, back, or extremity pain  Psychiatric: [ ] depression, [ ]anxiety, [ ]mood swings, or [ ]difficulty sleeping  Hematologic: [ ] easy bruising, [ ] bleeding gums       [ x] All others negative	  [ ] Unable to obtain    Vital Signs Last 24 Hrs  T(C): 36.8 (2025 04:00), Max: 36.8 (2025 00:08)  T(F): 98.3 (2025 04:00), Max: 98.3 (2025 00:08)  HR: 118 (2025 04:00) (78 - 129)  BP: 159/94 (2025 06:00) (135/81 - 159/94)  BP(mean): 110 (2025 15:58) (109 - 110)  RR: 18 (2025 04:00) (18 - 18)  SpO2: 95% (2025 04:00) (92% - 95%)    Parameters below as of 2025 04:00  Patient On (Oxygen Delivery Method): room air      I&O's Summary    31 Mar 2025 07:01  -  2025 07:00  --------------------------------------------------------  IN: 120 mL / OUT: 1500 mL / NET: -1380 mL    2025 07:01  -  2025 06:19  --------------------------------------------------------  IN: 480 mL / OUT: 0 mL / NET: 480 mL        PHYSICAL EXAM:  General: No acute distress BMI-28  HEENT: EOMI, PERRL[ ] Icteric  Neck: Supple, [ ] JVD  Lungs: Equal air entry bilaterally; [ ] Rales [ ] Rhonchi [ ] Wheezing  Heart: Regular rate and rhythm;[x ] Murmurs-  2 /6 [x ] Systolic [ ] Diastolic [ ] Radiation,No rubs, or gallops  Abdomen: Nontender, bowel sounds present  Extremities: No clubbing, cyanosis, [ ] edema[ ] Calf tenderness  Nervous system:  Alert & Oriented X3, no focal deficits  Psychiatric: Normal affect  Skin: No rashes or lesions      LABS:      137  |  96  |  68[H]  ----------------------------<  130[H]  4.8   |  27  |  4.89[H]    Ca    9.0      2025 14:47    TPro  7.6  /  Alb  3.2[L]  /  TBili  0.4  /  DBili  x   /  AST  27  /  ALT  17  /  AlkPhos  111  04-    Creatinine Trend: 4.89<--, 6.13<--, 5.94<--, 6.10<--, 7.06<--, 5.45<--                        9.1    7.03  )-----------( 247      ( 2025 14:47 )             29.3     PT/INR - ( 31 Mar 2025 20:20 )   PT: 19.0 sec;   INR: 1.66 ratio         PTT - ( 31 Mar 2025 20:20 )  PTT:37.4 sec    Lipid Panel:   Cardiac Enzymes: CARDIAC MARKERS ( 2025 01:33 )  x     / x     / x     / x     / 17.5 ng/mL  CARDIAC MARKERS ( 31 Mar 2025 20:20 )  x     / x     / x     / x     / 19.1 ng/mL        TTE W or WO Ultrasound Enhancing Agent (25 @ 13:20) >  CONCLUSIONS:      1. Left ventricular systolic function is moderately decreased with an ejection fraction of 42 % by Michaels's method of disks.   2. Basal and mid inferior septum and basal and mid inferior wall are abnormal.   3. Enlarged right ventricular cavity size and reduced right ventricular systolic function.   4. The right atrium is dilated.   5. Mild to moderate mitral regurgitation.   6. Mild aortic regurgitation.   7. Estimated pulmonary artery systolic pressure is 52 mmHg, consistent with moderate pulmonary hypertension.   8. Severe tricuspid regurgitation. Systolic flow reversal in the hepatic vein supports the finding of severe tricuspid regurgitation.   9. The inferior vena cava is dilated measuring 2.20 cm in diameter, (dilated >2.1cm) with normal inspiratory collapse (normal >50%) consistent with mildly elevated right atrial pressure (~8, range 5-10mmHg).  10. Compared to the transthoracic echocardiogram performed on 2025, there have been no significant interval changes however the valvular function was not evaluated on that study.     US Abdomen Limited (25 @ 17:16) >  IMPRESSION:  Abdominal and pelvic ascites as described above.  A right pleural effusion is partiallyimaged.

## 2025-04-02 NOTE — DISCHARGE NOTE PROVIDER - NSDCFUADDAPPT_GEN_ALL_CORE_FT
See Dr Parker 2-4 weeks> pt given contact info  VAC to be changed 2x week  Arranged by case management  TAMANNA  M/W/F

## 2025-04-02 NOTE — PRE-OP CHECKLIST - DNR CLARIFICATION FORM COMPLETED
Left message requesting callback.  Patient and family member would have to come in to fill out involvement of care.  This would have to be done with a witness.  Only other option would be to have a legal Power of .   n/a

## 2025-04-02 NOTE — PRE PROCEDURE NOTE - PRE PROCEDURE EVALUATION
Interventional Radiology    HPI: 53 y/o male with PMHx of HTN, HLD, ESRD on HD MWF via LUE AVF, ascites (requiring repeat paracenteses q4-6wks), NICM with moderate LV dysfunction w/ EF 35-40%() and CAD s/p atherectomy + SALLIE to D1(2024) recently long admission 2024-3/2025 for AHRF 2/2 fluid overload, underwent CABG w/ Dr brown c/c/b acute heart failure req IABP and , presents today from HD for abd discomfort and tachycardia. Reports sig orthopnea, difficulty lying flat due to SOB. Patient presenting to IR for paracentesis.    Allergies: No Known Allergies    Medications (Abx/Cardiac/Anticoagulation/Blood Products)  metoprolol succinate ER: 25 milliGRAM(s) Oral ( @ 10:10)  metoprolol tartrate: 50 milliGRAM(s) Oral ( @ 06:07)  metoprolol tartrate: 25 milliGRAM(s) Oral ( @ 06:25)  metoprolol tartrate: 25 milliGRAM(s) Oral ( @ 02:09)    Data:    T(C): 36.8  HR: 118  BP: 159/94  RR: 18  SpO2: 95%    Exam  General: No acute distress  Chest: Non labored breathing  Abdomen: Distended  Extremities: No swelling, warm    -WBC 7.05 / HgB 8.8 / Hct 27.6 / Plt 266  -Na 135 / Cl 95 / BUN 75 / Glucose 54  -K 4.8 / CO2 24 / Cr 5.48  -ALT 14 / Alk Phos 102 / T.Bili 0.3  -INR1.47    Imaging: Reviewed    Plan: 54y Male presents for paracentesis.    -Risks/Benefits/alternatives explained with the patient and/or healthcare proxy and witnessed informed consent obtained.     --  Derek Cordon NP  Interventional Radiology  Available on [employer] TEAMS / [email or web address]    For EMERGENT inquiries/questions:  IR Pager ([facility]): [phone]    For non-emergent consults/questions:   Please place a sunrise order "Consult- Interventional Radiology" with an appropriate callback number    For questions about scheduling during appropriate work hours, call IR :  [facility]: [phone]    For outpatient IR booking:  [facility]: [phone]

## 2025-04-02 NOTE — PROGRESS NOTE ADULT - PROBLEM SELECTOR PLAN 4
c/w ASA, Statin, BB  Renal following  Pt refused UF yesterday  HD M/W/F c/w ASA, Statin, BB  Renal following  Pt refused UF yesterday  HD M/W/F  plan for HD this afternoon

## 2025-04-02 NOTE — CONSULT NOTE ADULT - TIME BILLING
extra time needed to coordinate care between ER/CTU/HD-took many calls from ER/CTU about the updates
- Review of records, telemetry, vital signs and daily labs.   - General and cardiovascular physical examination.  - Generation of cardiovascular treatment plan and completion of note .  - Coordination of care.      Patient was seen and examined by me on 04/02/2025,interim events noted,labs and radiology studies reviewed.  Solo Quick MD,FACC.  [address] [place], [place] [id number].  [phone]  Availabe to call or text on [employer] TEAMS.

## 2025-04-02 NOTE — PROGRESS NOTE ADULT - SUBJECTIVE AND OBJECTIVE BOX
Subjective:  ?My stomach hurt, that procedure hurts"  Pt ambulating    Tele:     AF 90s                           T(C): 36.8 (04-02-25 @ 04:00), Max: 36.8 (04-02-25 @ 00:08)  HR: 118 (04-02-25 @ 04:00) (78 - 129)  BP: 159/94 (04-02-25 @ 06:00) (135/81 - 159/94)  RR: 18 (04-02-25 @ 04:00) (18 - 18)  SpO2: 95% (04-02-25 @ 04:00) (92% - 95%)        04-02    135  |  95[L]  |  75[H]  ----------------------------<  54[LL]  4.8   |  24  |  5.48[H]    Ca    8.6      02 Apr 2025 06:42    TPro  7.0  /  Alb  3.0[L]  /  TBili  0.3  /  DBili  x   /  AST  23  /  ALT  14  /  AlkPhos  102  04-02                               8.8    7.05  )-----------( 266      ( 02 Apr 2025 06:43 )             27.6        PT/INR - ( 02 Apr 2025 06:43 )   PT: 16.8 sec;   INR: 1.47 ratio         PTT - ( 02 Apr 2025 06:43 )  PTT:33.3 sec    CAPILLARY BLOOD GLUCOSE      POCT Blood Glucose.: 117 mg/dL (02 Apr 2025 07:49)  POCT Blood Glucose.: 57 mg/dL (02 Apr 2025 07:20)  POCT Blood Glucose.: 61 mg/dL (02 Apr 2025 07:19)  POCT Blood Glucose.: 106 mg/dL (01 Apr 2025 21:22)  POCT Blood Glucose.: 170 mg/dL (01 Apr 2025 16:26)  POCT Blood Glucose.: 113 mg/dL (01 Apr 2025 11:45)           CXR:      Assessment    General: NAD   Neurology: A&Ox3, NAD  CV : Irregular +S1S2  Lungs: Respirations non-labored, B/L BS clear  Abdomen: mild tenderness, Softer, less distended, +BSx4Q  Extremities: 2+ B/L LE edema, negative calf tenderness, +PP B/L, sacral  wound vac in place.         MEDICATIONS  (STANDING):  atorvastatin 80 milliGRAM(s) Oral at bedtime  epoetin ignacia (EPOGEN) Injectable 61953 Unit(s) IV Push <User Schedule>  insulin lispro (ADMELOG) corrective regimen sliding scale   SubCutaneous three times a day before meals  insulin lispro (ADMELOG) corrective regimen sliding scale   SubCutaneous at bedtime  lidocaine/prilocaine Cream 1 Application(s) Topical <User Schedule>  metoprolol tartrate 50 milliGRAM(s) Oral two times a day  mirtazapine 7.5 milliGRAM(s) Oral at bedtime  traZODone 100 milliGRAM(s) Oral at bedtime       PAST MEDICAL & SURGICAL HISTORY:  Type 2 diabetes mellitus      Hypertension      End stage renal disease  started HD 2/2019 T, Th, Sat via right chest permacath      Bone spur  right shoulder- hx of - sx done      Anemia      Injury of right wrist  hx of at age 14      History of hyperkalemia  before HD- K-6.2 - to repeat in am of sx, pt had HD today      S/P arthroscopy of right shoulder  2010      History of vascular access device  right chest permacath - 2/2019      H/O right wrist surgery  tendons repair - at age 14      AV fistula      H/O ventral hernia repair      S/P cholecystectomy               Subjective:  ?My stomach hurt, that procedure hurts"  Pt ambulating    Tele:     AF 90s                           T(C): 36.8 (04-02-25 @ 04:00), Max: 36.8 (04-02-25 @ 00:08)  HR: 118 (04-02-25 @ 04:00) (78 - 129)  BP: 159/94 (04-02-25 @ 06:00) (135/81 - 159/94)  RR: 18 (04-02-25 @ 04:00) (18 - 18)  SpO2: 95% (04-02-25 @ 04:00) (92% - 95%)        04-02    135  |  95[L]  |  75[H]  ----------------------------<  54[LL]  4.8   |  24  |  5.48[H]    Ca    8.6      02 Apr 2025 06:42    TPro  7.0  /  Alb  3.0[L]  /  TBili  0.3  /  DBili  x   /  AST  23  /  ALT  14  /  AlkPhos  102  04-02                               8.8    7.05  )-----------( 266      ( 02 Apr 2025 06:43 )             27.6        PT/INR - ( 02 Apr 2025 06:43 )   PT: 16.8 sec;   INR: 1.47 ratio         PTT - ( 02 Apr 2025 06:43 )  PTT:33.3 sec    CAPILLARY BLOOD GLUCOSE      POCT Blood Glucose.: 117 mg/dL (02 Apr 2025 07:49)  POCT Blood Glucose.: 57 mg/dL (02 Apr 2025 07:20)  POCT Blood Glucose.: 61 mg/dL (02 Apr 2025 07:19)  POCT Blood Glucose.: 106 mg/dL (01 Apr 2025 21:22)  POCT Blood Glucose.: 170 mg/dL (01 Apr 2025 16:26)  POCT Blood Glucose.: 113 mg/dL (01 Apr 2025 11:45)           CXR:      Assessment    General: NAD   Neurology: A&Ox3, NAD  CV : Irregular +S1S2  Lungs: Respirations non-labored, B/L BS clear  Abdomen: mild tenderness, Softer, less distended, +BSx4Q  Extremities: 2+ B/L LE edema, negative calf tenderness, +PP B/L, sacral  wound vac in place.         MEDICATIONS  (STANDING):  atorvastatin 80 milliGRAM(s) Oral at bedtime  epoetin ignacia (EPOGEN) Injectable 00301 Unit(s) IV Push <User Schedule>  insulin lispro (ADMELOG) corrective regimen sliding scale   SubCutaneous three times a day before meals  insulin lispro (ADMELOG) corrective regimen sliding scale   SubCutaneous at bedtime  lidocaine/prilocaine Cream 1 Application(s) Topical <User Schedule>  metoprolol tartrate 50 milliGRAM(s) Oral two times a day  mirtazapine 7.5 milliGRAM(s) Oral at bedtime  traZODone 100 milliGRAM(s) Oral at bedtime       PAST MEDICAL & SURGICAL HISTORY:  Type 2 diabetes mellitus      Hypertension      End stage renal disease  started HD 2/2019 T, Th, Sat via right chest permacath      Bone spur  right shoulder- hx of - sx done      Anemia      Injury of right wrist  hx of at age 14      History of hyperkalemia  before HD- K-6.2 - to repeat in am of sx, pt had HD today      S/P arthroscopy of right shoulder  2010      History of vascular access device  right chest permacath - 2/2019      H/O right wrist surgery  tendons repair - at age 14      AV fistula      H/O ventral hernia repair      S/P cholecystectomy        ECHO < from: TTE W or WO Ultrasound Enhancing Agent (04.01.25 @ 13:20) >  CONCLUSIONS:      1. Left ventricular systolic function is moderately decreased with an ejection fraction of 42 % by Michaels's method of disks.   2. Basal and mid inferior septum and basal and mid inferior wall are abnormal.   3. Enlarged right ventricular cavity size and reduced right ventricular systolic function.   4. The right atrium is dilated.   5. Mild to moderate mitral regurgitation.   6. Mild aortic regurgitation.   7. Estimated pulmonary artery systolic pressure is 52 mmHg, consistent with moderate pulmonary hypertension.   8. Severe tricuspid regurgitation. Systolic flow reversal in the hepatic vein supports the finding of severe tricuspid regurgitation.   9. The inferior vena cava is dilated measuring 2.20 cm in diameter, (dilated >2.1cm) with normal inspiratory collapse (normal >50%) consistent with mildly elevated right atrial pressure (~8, range 5-10mmHg).  10. Compared to the transthoracic echocardiogram performed on 2/18/2025, there have been no significant interval changes however the valvular function was not evaluated on that study.    < end of copied text >

## 2025-04-02 NOTE — DISCHARGE NOTE PROVIDER - CARE PROVIDER_API CALL
Mesfin Parker  Interventional Cardiology  [address] [place] [id number]  Phone: [phone]  Fax: [phone]  Follow Up Time: 1 month

## 2025-04-02 NOTE — DISCHARGE NOTE NURSING/CASE MANAGEMENT/SOCIAL WORK - NSDCPEFALRISK_GEN_ALL_CORE
I have reviewed this patient's current medication list and recent laboratory results. At this time, I do not suggest any drug therapy adjustments or additional laboratory monitoring. Thank you,  Abena CUMMINS  Ph. M. S.  6/11/2019 For information on Fall & Injury Prevention, visit: [email or web address] OR  [email or web address] OR  https://www.cdc.gov/steadi/patient.html

## 2025-04-02 NOTE — PROGRESS NOTE ADULT - SUBJECTIVE AND OBJECTIVE BOX
Dr Rivers asked our Structural Heart team to evaluate Mr Torres for severe TR. He is currently undergoing HD at the bedside. I spoke to him, he does not wish to pursue any further TR evaluation at this time, he states he has a ride today and would like to be discharged after his HD is completed. I gave him my card with contact information and recommended he call our office to schedule an appointment in our [facility] for consultation; he is amenable to doing so.

## 2025-04-02 NOTE — DISCHARGE NOTE NURSING/CASE MANAGEMENT/SOCIAL WORK - FINANCIAL ASSISTANCE
[facility] provides services at a reduced cost to those who are determined to be eligible through [facility]’s financial assistance program. Information regarding [facility]’s financial assistance program can be found by going to [email or web address] or by calling [phone].

## 2025-04-02 NOTE — CONSULT NOTE ADULT - ASSESSMENT
53 y/o male with PMHx of HTN, HLD, ESRD on HD MWF via LUE AVF, ascites (requiring repeat paracenteses q4-6wks), NICM with moderate LV dysfunction w/ EF 35-40%() and CAD s/p atherectomy + SALLIE to D1(2024) recently long admission 2024-3/2025 for AHRF 2/2 fluid overload, underwent CABG w/ Dr brown c/c/b acute heart failure req IABP and , presents today from HD for abd discomfort and tachycardia. Reports sig orthopnea, difficulty lying flat due to SOB. Unable to undergo HD today due to SOB and tahycardia. In the ED denies fever chills. EKG in the ED Afib rate 98, no acute ischemic changes. CTS consulted for further management given recent admission. Renal consulted in the ED  HD inpatient. Emergent HD overnite   IR para for ascites scheduled for Wednesday> Pt refusing UF today as per Renal> will repeat labs  PT changed sacral VAC ECHO completed    # Ascites.   CT Abdomen 01/10-Large volume ascites-  US abdomen--Small to moderate volume abdominal/pelvic ascites  US abdomen  Moderate ascites  US Abdomen -Moderate 4 quad ascites  US Abdomen Limited 03/10-Moderate ascites throughout abdomen, with large pelvic ascites.  ·  Plan: IR consulted for para>planned for Wednesday      # # Acute on Chronic Systolic Heart Failure now improved  EF-35% ,severe TRLVEF improved post bypass   -TTE EF 40%,trace uepsusds89/29-TTE EF 60%   -Normal LV systolic function Moderate pericardial effusion  Repeat TTE Normal LV Systolic function Small Pericardial effusion decreased from 2/3      # CAD s/p NSTEMI  Hx CAD s/p PCI LAD -POD#1-C3L free LIMA-LAD; SVG-Diag; SVG-leftPL    # ESRD  Now on  HD-MWF

## 2025-04-02 NOTE — PROGRESS NOTE ADULT - ASSESSMENT
55 yo M w/ PMH of ESRD on MWF schedule last HD 3/28, HTN, Anemia, DM2, hyperkalemia presents to the ED with SOB, ascites, tachycardia. Nephrology consulted for dialysis needs.    A/P  ESRD  Center: [facility]  Nephrologist Dr. Bailey  Access: LUE AVF  MWF schedule  Last hd in center 3/28  Consent obtained, witnessed, placed in dialysis unit  s/p HD 04/01 AM  HD today, S/p paracentesis today 3.5 L removed  Renal Diet  Monitor bmp     HTN  bp fluctuating  continue current meds  UF w/ hd as tolerated  monitor bp     Anemia  hb below goal  CRISTIANE w/ HD  transfuse to keep hb >8  monitor hb     CKD-MBD  check pth  monitor ca, phos daily     SOB  clinically fluid ovrloaded  s/p HD last night; refusing pUF today  rest care as per primary team

## 2025-04-02 NOTE — DISCHARGE NOTE NURSING/CASE MANAGEMENT/SOCIAL WORK - PATIENT PORTAL LINK FT
You can access the FollowMyHealth Patient Portal offered by Health system by registering at the following website: [email or web address]. By joining [employer]’s FollowMyHealth portal, you will also be able to view your health information using other applications (apps) compatible with our system.

## 2025-04-03 ENCOUNTER — NON-APPOINTMENT (OUTPATIENT)
Age: 55
End: 2025-04-03

## 2025-04-04 LAB — NON-GYNECOLOGICAL CYTOLOGY STUDY: SIGNIFICANT CHANGE UP

## 2025-04-07 LAB
CULTURE RESULTS: SIGNIFICANT CHANGE UP
SPECIMEN SOURCE: SIGNIFICANT CHANGE UP

## 2025-04-13 PROCEDURE — 99261: CPT

## 2025-04-13 PROCEDURE — 97162 PT EVAL MOD COMPLEX 30 MIN: CPT

## 2025-04-13 PROCEDURE — 93005 ELECTROCARDIOGRAM TRACING: CPT

## 2025-04-13 PROCEDURE — 82330 ASSAY OF CALCIUM: CPT

## 2025-04-13 PROCEDURE — 82962 GLUCOSE BLOOD TEST: CPT

## 2025-04-13 PROCEDURE — 82945 GLUCOSE OTHER FLUID: CPT

## 2025-04-13 PROCEDURE — C1729: CPT

## 2025-04-13 PROCEDURE — 85730 THROMBOPLASTIN TIME PARTIAL: CPT

## 2025-04-13 PROCEDURE — 82947 ASSAY GLUCOSE BLOOD QUANT: CPT

## 2025-04-13 PROCEDURE — 85610 PROTHROMBIN TIME: CPT

## 2025-04-13 PROCEDURE — 87070 CULTURE OTHR SPECIMN AEROBIC: CPT

## 2025-04-13 PROCEDURE — 84132 ASSAY OF SERUM POTASSIUM: CPT

## 2025-04-13 PROCEDURE — 80053 COMPREHEN METABOLIC PANEL: CPT

## 2025-04-13 PROCEDURE — 83880 ASSAY OF NATRIURETIC PEPTIDE: CPT

## 2025-04-13 PROCEDURE — 82042 OTHER SOURCE ALBUMIN QUAN EA: CPT

## 2025-04-13 PROCEDURE — 85014 HEMATOCRIT: CPT

## 2025-04-13 PROCEDURE — 74018 RADEX ABDOMEN 1 VIEW: CPT

## 2025-04-13 PROCEDURE — 82435 ASSAY OF BLOOD CHLORIDE: CPT

## 2025-04-13 PROCEDURE — 88112 CYTOPATH CELL ENHANCE TECH: CPT

## 2025-04-13 PROCEDURE — 85018 HEMOGLOBIN: CPT

## 2025-04-13 PROCEDURE — 87015 SPECIMEN INFECT AGNT CONCNTJ: CPT

## 2025-04-13 PROCEDURE — 96375 TX/PRO/DX INJ NEW DRUG ADDON: CPT

## 2025-04-13 PROCEDURE — 83605 ASSAY OF LACTIC ACID: CPT

## 2025-04-13 PROCEDURE — 93306 TTE W/DOPPLER COMPLETE: CPT

## 2025-04-13 PROCEDURE — 84157 ASSAY OF PROTEIN OTHER: CPT

## 2025-04-13 PROCEDURE — 96374 THER/PROPH/DIAG INJ IV PUSH: CPT

## 2025-04-13 PROCEDURE — 82553 CREATINE MB FRACTION: CPT

## 2025-04-13 PROCEDURE — 87102 FUNGUS ISOLATION CULTURE: CPT

## 2025-04-13 PROCEDURE — 84295 ASSAY OF SERUM SODIUM: CPT

## 2025-04-13 PROCEDURE — 83615 LACTATE (LD) (LDH) ENZYME: CPT

## 2025-04-13 PROCEDURE — 99285 EMERGENCY DEPT VISIT HI MDM: CPT | Mod: 25

## 2025-04-13 PROCEDURE — 93308 TTE F-UP OR LMTD: CPT

## 2025-04-13 PROCEDURE — 82550 ASSAY OF CK (CPK): CPT

## 2025-04-13 PROCEDURE — 87075 CULTR BACTERIA EXCEPT BLOOD: CPT

## 2025-04-13 PROCEDURE — 85027 COMPLETE CBC AUTOMATED: CPT

## 2025-04-13 PROCEDURE — 87205 SMEAR GRAM STAIN: CPT

## 2025-04-13 PROCEDURE — 82803 BLOOD GASES ANY COMBINATION: CPT

## 2025-04-13 PROCEDURE — 49083 ABD PARACENTESIS W/IMAGING: CPT

## 2025-04-13 PROCEDURE — 76705 ECHO EXAM OF ABDOMEN: CPT

## 2025-04-13 PROCEDURE — 89051 BODY FLUID CELL COUNT: CPT

## 2025-04-13 PROCEDURE — 85025 COMPLETE CBC W/AUTO DIFF WBC: CPT

## 2025-04-13 PROCEDURE — 88305 TISSUE EXAM BY PATHOLOGIST: CPT

## 2025-04-13 PROCEDURE — 36415 COLL VENOUS BLD VENIPUNCTURE: CPT

## 2025-04-13 PROCEDURE — 71045 X-RAY EXAM CHEST 1 VIEW: CPT

## 2025-04-13 PROCEDURE — 84484 ASSAY OF TROPONIN QUANT: CPT

## 2025-04-15 NOTE — CHART NOTE - NSCHARTNOTEFT_GEN_A_CORE
Patient was readmitted with acute on chronic systolic heart failure and hyperkalemia was present in the setting of ESRD requiring dialysis.

## 2025-04-17 ENCOUNTER — APPOINTMENT (OUTPATIENT)
Dept: WOUND CARE | Facility: HOSPITAL | Age: 55
End: 2025-04-17
Payer: MEDICARE

## 2025-04-17 ENCOUNTER — TRANSCRIPTION ENCOUNTER (OUTPATIENT)
Age: 55
End: 2025-04-17

## 2025-04-17 VITALS
DIASTOLIC BLOOD PRESSURE: 78 MMHG | TEMPERATURE: 98.3 F | SYSTOLIC BLOOD PRESSURE: 126 MMHG | HEART RATE: 78 BPM | OXYGEN SATURATION: 97 % | RESPIRATION RATE: 18 BRPM

## 2025-04-17 DIAGNOSIS — L89.154 PRESSURE ULCER OF SACRAL REGION, STAGE 4: ICD-10-CM

## 2025-04-17 DIAGNOSIS — Z95.1 PRESENCE OF AORTOCORONARY BYPASS GRAFT: ICD-10-CM

## 2025-04-17 DIAGNOSIS — I50.9 HEART FAILURE, UNSPECIFIED: ICD-10-CM

## 2025-04-17 PROCEDURE — 11043 DBRDMT MUSC&/FSCA 1ST 20/<: CPT

## 2025-04-17 PROCEDURE — 99214 OFFICE O/P EST MOD 30 MIN: CPT | Mod: 25

## 2025-04-17 RX ORDER — NYSTATIN 100000 [USP'U]/ML
100000 SUSPENSION ORAL 3 TIMES DAILY
Qty: 105 | Refills: 2 | Status: ACTIVE | COMMUNITY
Start: 2025-04-17 | End: 1900-01-01

## 2025-04-17 RX ORDER — LIDOCAINE/TETRACAINE/BENZOCAIN 10-10-20 %
OINTMENT (GRAM) TOPICAL
Qty: 1 | Refills: 3 | Status: ACTIVE | COMMUNITY
Start: 2025-04-17 | End: 1900-01-01

## 2025-05-08 ENCOUNTER — APPOINTMENT (OUTPATIENT)
Dept: WOUND CARE | Facility: HOSPITAL | Age: 55
End: 2025-05-08
Payer: MEDICARE

## 2025-05-08 VITALS
HEART RATE: 97 BPM | OXYGEN SATURATION: 95 % | SYSTOLIC BLOOD PRESSURE: 136 MMHG | RESPIRATION RATE: 18 BRPM | DIASTOLIC BLOOD PRESSURE: 70 MMHG | TEMPERATURE: 98.3 F

## 2025-05-08 DIAGNOSIS — Z95.1 PRESENCE OF AORTOCORONARY BYPASS GRAFT: ICD-10-CM

## 2025-05-08 DIAGNOSIS — L89.154 PRESSURE ULCER OF SACRAL REGION, STAGE 4: ICD-10-CM

## 2025-05-08 PROCEDURE — 99213 OFFICE O/P EST LOW 20 MIN: CPT | Mod: 25

## 2025-05-08 PROCEDURE — 11042 DBRDMT SUBQ TIS 1ST 20SQCM/<: CPT

## 2025-05-30 NOTE — ED PROVIDER NOTE - CADM POA PRESS ULCER
Adderall Pending    Insurance response  Prescription Drug Insurance: [place] Medicaid   Notes: Faxed prior authorization request to [phone] for provider signature. Please have the provider sign and fax to [facility] [phone], and they will complete the PA.     Please update encounter when faxed to pharmacy. Thank you!     
Forms received and given to PCP once signed will fax back  
Office/Clinical staff confirmed the PA/PDL paperwork has been received and will be faxed to the pharmacy once signed.     . It is the responsibility of the filling pharmacy to obtain the prior auth. If the office has questions regarding this PA, please contact the patients pharmacy directly.    The EPA team will close out of this encounter at this time.     
No

## 2025-06-05 ENCOUNTER — APPOINTMENT (OUTPATIENT)
Dept: WOUND CARE | Facility: HOSPITAL | Age: 55
End: 2025-06-05

## 2025-06-05 VITALS
RESPIRATION RATE: 18 BRPM | HEART RATE: 89 BPM | DIASTOLIC BLOOD PRESSURE: 69 MMHG | WEIGHT: 82.98 LBS | BODY MASS INDEX: 11.62 KG/M2 | TEMPERATURE: 98.6 F | SYSTOLIC BLOOD PRESSURE: 122 MMHG | OXYGEN SATURATION: 98 % | HEIGHT: 71 IN

## 2025-06-05 PROCEDURE — 11042 DBRDMT SUBQ TIS 1ST 20SQCM/<: CPT

## 2025-06-05 PROCEDURE — 99213 OFFICE O/P EST LOW 20 MIN: CPT | Mod: 25

## 2025-06-17 ENCOUNTER — APPOINTMENT (OUTPATIENT)
Dept: CARDIOTHORACIC SURGERY | Facility: CLINIC | Age: 55
End: 2025-06-17

## 2025-06-17 VITALS
DIASTOLIC BLOOD PRESSURE: 106 MMHG | RESPIRATION RATE: 20 BRPM | OXYGEN SATURATION: 90 % | HEIGHT: 71 IN | WEIGHT: 189.59 LBS | SYSTOLIC BLOOD PRESSURE: 162 MMHG | HEART RATE: 87 BPM | BODY MASS INDEX: 26.54 KG/M2

## 2025-06-17 PROCEDURE — 99204 OFFICE O/P NEW MOD 45 MIN: CPT

## 2025-06-17 RX ORDER — NIFEDIPINE 30 MG/1
30 TABLET, EXTENDED RELEASE ORAL DAILY
Qty: 90 | Refills: 1 | Status: ACTIVE | COMMUNITY
Start: 2025-06-17 | End: 1900-01-01

## 2025-07-08 ENCOUNTER — APPOINTMENT (OUTPATIENT)
Dept: WOUND CARE | Facility: HOSPITAL | Age: 55
End: 2025-07-08
Payer: MEDICARE

## 2025-07-08 PROBLEM — Z99.2 DIALYSIS PATIENT: Status: RESOLVED | Noted: 2025-07-08 | Resolved: 2025-07-08

## 2025-07-08 PROCEDURE — 99213 OFFICE O/P EST LOW 20 MIN: CPT

## 2025-08-04 PROBLEM — I50.810 CHF WITH RIGHT HEART FAILURE: Status: ACTIVE | Noted: 2025-08-04

## 2025-08-04 PROBLEM — I48.91 ATRIAL FIBRILLATION, UNSPECIFIED TYPE: Status: ACTIVE | Noted: 2025-08-04

## 2025-08-12 ENCOUNTER — RESULT REVIEW (OUTPATIENT)
Age: 55
End: 2025-08-12

## 2025-08-12 ENCOUNTER — OUTPATIENT (OUTPATIENT)
Dept: OUTPATIENT SERVICES | Facility: HOSPITAL | Age: 55
LOS: 1 days | End: 2025-08-12
Payer: MEDICARE

## 2025-08-12 ENCOUNTER — APPOINTMENT (OUTPATIENT)
Dept: CARDIOTHORACIC SURGERY | Facility: CLINIC | Age: 55
End: 2025-08-12

## 2025-08-12 VITALS
OXYGEN SATURATION: 96 % | BODY MASS INDEX: 27.47 KG/M2 | SYSTOLIC BLOOD PRESSURE: 116 MMHG | WEIGHT: 196.21 LBS | DIASTOLIC BLOOD PRESSURE: 54 MMHG | HEART RATE: 71 BPM | RESPIRATION RATE: 16 BRPM | HEIGHT: 71 IN

## 2025-08-12 DIAGNOSIS — R93.1 ABNORMAL FINDINGS ON DIAGNOSTIC IMAGING OF HEART AND CORONARY CIRCULATION: ICD-10-CM

## 2025-08-12 DIAGNOSIS — I10 ESSENTIAL (PRIMARY) HYPERTENSION: ICD-10-CM

## 2025-08-12 DIAGNOSIS — Z98.890 OTHER SPECIFIED POSTPROCEDURAL STATES: Chronic | ICD-10-CM

## 2025-08-12 DIAGNOSIS — I77.0 ARTERIOVENOUS FISTULA, ACQUIRED: Chronic | ICD-10-CM

## 2025-08-12 DIAGNOSIS — I38 ENDOCARDITIS, VALVE UNSPECIFIED: ICD-10-CM

## 2025-08-12 DIAGNOSIS — I25.9 CHRONIC ISCHEMIC HEART DISEASE, UNSPECIFIED: ICD-10-CM

## 2025-08-12 DIAGNOSIS — I48.0 PAROXYSMAL ATRIAL FIBRILLATION: ICD-10-CM

## 2025-08-12 DIAGNOSIS — I07.1 RHEUMATIC TRICUSPID INSUFFICIENCY: ICD-10-CM

## 2025-08-12 DIAGNOSIS — I35.0 NONRHEUMATIC AORTIC (VALVE) STENOSIS: ICD-10-CM

## 2025-08-12 DIAGNOSIS — R06.00 DYSPNEA, UNSPECIFIED: ICD-10-CM

## 2025-08-12 DIAGNOSIS — I73.9 PERIPHERAL VASCULAR DISEASE, UNSPECIFIED: ICD-10-CM

## 2025-08-12 PROCEDURE — 76377 3D RENDER W/INTRP POSTPROCES: CPT | Mod: 26

## 2025-08-12 PROCEDURE — 93000 ELECTROCARDIOGRAM COMPLETE: CPT

## 2025-08-12 PROCEDURE — 76377 3D RENDER W/INTRP POSTPROCES: CPT

## 2025-08-12 PROCEDURE — 93356 MYOCRD STRAIN IMG SPCKL TRCK: CPT

## 2025-08-12 PROCEDURE — 93306 TTE W/DOPPLER COMPLETE: CPT | Mod: 26

## 2025-08-12 PROCEDURE — 99215 OFFICE O/P EST HI 40 MIN: CPT

## 2025-08-12 PROCEDURE — 93306 TTE W/DOPPLER COMPLETE: CPT

## 2025-08-19 ENCOUNTER — OUTPATIENT (OUTPATIENT)
Dept: OUTPATIENT SERVICES | Facility: HOSPITAL | Age: 55
LOS: 1 days | End: 2025-08-19
Payer: MEDICARE

## 2025-08-19 ENCOUNTER — APPOINTMENT (OUTPATIENT)
Dept: CARDIOLOGY | Facility: CLINIC | Age: 55
End: 2025-08-19

## 2025-08-19 DIAGNOSIS — I77.0 ARTERIOVENOUS FISTULA, ACQUIRED: Chronic | ICD-10-CM

## 2025-08-19 DIAGNOSIS — Z90.49 ACQUIRED ABSENCE OF OTHER SPECIFIED PARTS OF DIGESTIVE TRACT: Chronic | ICD-10-CM

## 2025-08-19 DIAGNOSIS — I07.1 RHEUMATIC TRICUSPID INSUFFICIENCY: ICD-10-CM

## 2025-08-19 DIAGNOSIS — Z98.890 OTHER SPECIFIED POSTPROCEDURAL STATES: Chronic | ICD-10-CM

## 2025-08-19 DIAGNOSIS — Z00.00 ENCOUNTER FOR GENERAL ADULT MEDICAL EXAMINATION WITHOUT ABNORMAL FINDINGS: ICD-10-CM

## 2025-08-19 PROCEDURE — 74174 CTA ABD&PLVS W/CONTRAST: CPT

## 2025-08-19 PROCEDURE — 75574 CT ANGIO HRT W/3D IMAGE: CPT

## 2025-08-19 PROCEDURE — 74174 CTA ABD&PLVS W/CONTRAST: CPT | Mod: 26

## 2025-08-19 PROCEDURE — 75574 CT ANGIO HRT W/3D IMAGE: CPT | Mod: 26

## 2025-08-19 PROCEDURE — 71275 CT ANGIOGRAPHY CHEST: CPT

## 2025-08-19 PROCEDURE — 71275 CT ANGIOGRAPHY CHEST: CPT | Mod: 26

## (undated) DEVICE — PACING CABLE (BROWN) A/V TEMP SCREW DOWN 12FT

## (undated) DEVICE — DRAPE 1/2 SHEET 40X57"

## (undated) DEVICE — SUT PROLENE 7-0 4-24" BV-1

## (undated) DEVICE — DRSG DERMABOND PRINEO 60CM

## (undated) DEVICE — VENTING ADAPTER "Y" (RED/BLUE) 7.5"

## (undated) DEVICE — DRSG PREVENA PEEL & PLACE KIT 20CM

## (undated) DEVICE — DRSG STERISTRIPS 0.5 X 4"

## (undated) DEVICE — SUT MONOCRYL 4-0 27" PS-2 UNDYED

## (undated) DEVICE — SYR ASEPTO

## (undated) DEVICE — SUT HISTOACRYL BLUE

## (undated) DEVICE — SUT POLYSORB 2-0 30" GS-21 UNDYED

## (undated) DEVICE — ELCTR CORD FOOTSWITCH 1PLR LAPSCP 10FT

## (undated) DEVICE — DRSG OPSITE 2.5 X 2"

## (undated) DEVICE — SOL NORMOSOL-R PH7.4 1000ML

## (undated) DEVICE — NDL BLUNT 18G LOOP VESSEL MAXI WHITE

## (undated) DEVICE — SUT POLYSORB 0 36" GS-25 UNDYED

## (undated) DEVICE — SUT QUILL MONODERM 3-0 30CM PS-2

## (undated) DEVICE — SUT BIOSYN 4-0 18" P-12

## (undated) DEVICE — TROCAR COVIDIEN VERSAPORT BLADELESS OPTICAL 12MM STANDARD

## (undated) DEVICE — GOWN XXXL

## (undated) DEVICE — TUBING STRYKER PNEUMOCLEAR SMOKE EVACUATION HIGH FLOW

## (undated) DEVICE — SUT PROLENE 6-0 4-30" C-1

## (undated) DEVICE — SUT POLYSORB 2-0 30" GS-26

## (undated) DEVICE — TUBING ATS SUCTION LINE

## (undated) DEVICE — SUT PROLENE 4-0 36" RB-1

## (undated) DEVICE — SUT SOFSILK 0 30" V-20

## (undated) DEVICE — D HELP - CLEARVIEW CLEARIFY SYSTEM

## (undated) DEVICE — SOL IRR POUR H2O 250ML

## (undated) DEVICE — DRAPE TOWEL BLUE 17" X 24"

## (undated) DEVICE — DRAPE INSTRUMENT POUCH 6.75" X 11"

## (undated) DEVICE — CHEST DRAIN PLEUR-EVAC WET/WET ADULT-PEDS SINGLE (QUICK)

## (undated) DEVICE — CONNECTOR STRAIGHT 3/8 X 1/2"

## (undated) DEVICE — AORTIC PUNCH 5MM STANDARD HANDLE

## (undated) DEVICE — SENSOR MYOCARDIAL TEMP 15MM

## (undated) DEVICE — VISITEC 4X4

## (undated) DEVICE — BLADE SCALPEL SAFETYLOCK #10

## (undated) DEVICE — ELCTR BOVIE BLADE 3/4" EXTENDED LENGTH 6"

## (undated) DEVICE — PACK UNIVERSAL CARDIAC

## (undated) DEVICE — DRSG KLING 6"

## (undated) DEVICE — SYS VEIN HARVESTING VIRTUOSAPH PLUS W/ RADIAL

## (undated) DEVICE — DRSG OPSITE 13.75 X 4"

## (undated) DEVICE — TUBING SUCTION 20FT

## (undated) DEVICE — SUT PROLENE 4-0 36" BB

## (undated) DEVICE — CONNECTOR "Y" 1/2 X 3/8 X 3/8"

## (undated) DEVICE — VESSEL LOOP MAXI-RED  0.120" X 16"

## (undated) DEVICE — SUCTION CATH ARGYLE WHISTLE TIP 14FR STRAIGHT PACKED

## (undated) DEVICE — BLADE SCALPEL SAFETYLOCK #11

## (undated) DEVICE — AORTIC PUNCH 4.0MM LONG LENGTH HANDLE

## (undated) DEVICE — ELCTR BOVIE TIP BLADE MEGADYNE E-Z CLEAN 2.5" (SHORT)

## (undated) DEVICE — XI DRAPE COLUMN

## (undated) DEVICE — DRSG ACE BANDAGE 6"

## (undated) DEVICE — XI ARM CLIP APPLIER LARGE

## (undated) DEVICE — ELCTR BOVIE TIP BLADE VALLEYLAB 6.5"

## (undated) DEVICE — PREP DURAPREP 26CC

## (undated) DEVICE — POSITIONER CARDIAC BUMP

## (undated) DEVICE — BLADE SCALPEL SAFETYLOCK #15

## (undated) DEVICE — NDL HYPO SAFE 22G X 1.5" (BLACK)

## (undated) DEVICE — XI ARM PERMANENT CAUTERY HOOK

## (undated) DEVICE — SUT SURGICAL STEEL 6 30" BP-1

## (undated) DEVICE — ELCTR BOVIE PENCIL HANDPIECE ROCKER SWITCH 15FT

## (undated) DEVICE — AORTIC PUNCH 4.0MM STANDARD HANDLE

## (undated) DEVICE — SOL IRR BAG NS 0.9% 3000ML

## (undated) DEVICE — VESSEL LOOP MAXI-BLUE 0.120" X 16"

## (undated) DEVICE — SUT PROLENE 7-0 24" BV175-8 MULTIPASS

## (undated) DEVICE — SOL ANTI FOG (FRED)

## (undated) DEVICE — DRSG TEGADERM 6 X 8"

## (undated) DEVICE — TUBING STRYKEFLOW II SUCTION / IRRIGATOR

## (undated) DEVICE — MEDTRONIC CLEARVIEW BLOWER MISTER KIT W TUBING SET

## (undated) DEVICE — DRAPE XL SHEET 77X98"

## (undated) DEVICE — STEALTH CLAMP INSERT FIBRA/FIBRA 90MM

## (undated) DEVICE — GOWN TRIMAX XXL

## (undated) DEVICE — SUT DOUBLE 6 WIRE STERNAL

## (undated) DEVICE — DRAPE IOBAN 23" X 23"

## (undated) DEVICE — GOWN TRIMAX LG

## (undated) DEVICE — BULLDOG SPRING CLIP 6MM SOFT/SOFT

## (undated) DEVICE — PACK CARDIAC YELLOW

## (undated) DEVICE — SUT PROLENE 3-0 36" SH

## (undated) DEVICE — SPONGE PEANUT AUTO COUNT

## (undated) DEVICE — GLV 8 PROTEXIS (WHITE)

## (undated) DEVICE — WARMING BLANKET DUO-THERM HYPER/HYPOTHERM ADULT

## (undated) DEVICE — SUT PROLENE 6-0 30" BV-1

## (undated) DEVICE — SUT PROLENE 8-0 24" BV175-6

## (undated) DEVICE — POSITIONER FOAM EGG CRATE ULNAR 2PCS (PINK)

## (undated) DEVICE — SYNOVIS VASCULAR PROBE 1.5MM 15CM

## (undated) DEVICE — SUT STAINLESS STEEL 5 18" SCC

## (undated) DEVICE — SAW BLADE STRYKER STERNUM 31MM X 6.27 X .79

## (undated) DEVICE — PACK ROBOTIC LIJ

## (undated) DEVICE — SUT POLYSORB 4-0 30" CVF-23

## (undated) DEVICE — SUT VICRYL 0 27" UR-6

## (undated) DEVICE — SUT PROLENE 7-0 24" BV175-6

## (undated) DEVICE — SUT BLUNT SZ 5

## (undated) DEVICE — DRAIN CHANNEL 32FR ROUND HUBLESS FULL FLUTED

## (undated) DEVICE — DRAPE TOWEL BLUE STICKY

## (undated) DEVICE — TUBING TUR 2 PRONG

## (undated) DEVICE — SUCTION YANKAUER NO CONTROL VENT

## (undated) DEVICE — LAP PAD 18 X 18"

## (undated) DEVICE — DRAPE IOBAN 33" X 23"

## (undated) DEVICE — PUNCH AORTIC/VEIN ROUND 2.7MM

## (undated) DEVICE — CONNECTOR INTERSEPT "Y" 1/4 X 1/4 X 1/4"

## (undated) DEVICE — XI ARM FORCEP FENESTRATED BIPOLAR 8MM

## (undated) DEVICE — DRAPE MAYO STAND 30"

## (undated) DEVICE — SUT TICRON 5 30" KV-40

## (undated) DEVICE — TUBING INSUFFLATION LAP FILTER 10FT

## (undated) DEVICE — TIP METZENBAUM SCISSOR MONOPOLAR ENDOCUT (ORANGE)

## (undated) DEVICE — DRAPE SLUSH / WARMER 44 X 66"

## (undated) DEVICE — FEEDING TUBE NG SUMP 16FR 48"

## (undated) DEVICE — MARKING PEN W RULER

## (undated) DEVICE — NDL COUNTER FOAM AND MAGNET 40-70

## (undated) DEVICE — TUBING CONNECTING 6MM 20FT

## (undated) DEVICE — TOURNIQUET SET 12FR (1 RED, 1 BLUE, 1 SNARE) 7"

## (undated) DEVICE — FOLEY TRAY 16FR 5CC LF LUBRISIL ADVANCE TEMP CLOSED

## (undated) DEVICE — SYR LUER LOK 20CC

## (undated) DEVICE — ELCTR BOVIE TIP BLADE MEGADYNE E-Z CLEAN 6.5" (LONG)

## (undated) DEVICE — SUT SOFSILK 0 30" TIES

## (undated) DEVICE — XI CANNULA SEAL 5MM - 8 MM

## (undated) DEVICE — SUT PROLENE 6-0 4-30" BV-1

## (undated) DEVICE — DRAIN RESERVOIR FOR JACKSON PRATT 100CC CARDINAL

## (undated) DEVICE — FOR-ESU VALLEYLAB T7E14996DX: Type: DURABLE MEDICAL EQUIPMENT

## (undated) DEVICE — SOL IRR POUR NS 0.9% 500ML

## (undated) DEVICE — SUT BOOT STANDARD (ASSORTED) 5 PAIR

## (undated) DEVICE — XI ARM SCISSOR ROUND TIP 8MM

## (undated) DEVICE — DRAPE 3/4 SHEET W REINFORCEMENT 56X77"

## (undated) DEVICE — VENODYNE/SCD SLEEVE CALF MEDIUM

## (undated) DEVICE — XI ARM FORCEP PROGRASP 8MM

## (undated) DEVICE — BEAVER BLADE MINI SHARP ALL ROUND (BLUE)

## (undated) DEVICE — SUMP PERICARDIAL 20FR 1/4" ADULT

## (undated) DEVICE — SUT PROLENE 4-0 36" SH

## (undated) DEVICE — STAPLER SKIN VISI-STAT 35 WIDE

## (undated) DEVICE — XI DRAPE ARM

## (undated) DEVICE — XI OBTURATOR OPTICAL BLADELESS 8MM

## (undated) DEVICE — TUBING TRUWAVE PRESSURE MALE/FEMALE 72"

## (undated) DEVICE — SPECIMEN CONTAINER 100ML

## (undated) DEVICE — VASOVIEW HEMOPRO 2

## (undated) DEVICE — SUT SOFSILK 2 60" TIES

## (undated) DEVICE — PAD NERVE PHRENIC NERVE

## (undated) DEVICE — ENDOCATCH 10MM

## (undated) DEVICE — MULTIPLE PERFUSION SET FEMALE 1 INLET LEG W 4 LEGS 15" (BLUE/RED)

## (undated) DEVICE — DRAIN CHANNEL 19FR ROUND FULL FLUTED

## (undated) DEVICE — CATH IV SAFE INSYTE 24G X 3/4" (YELLOW)

## (undated) DEVICE — XI ARM FORCEP CADIERE 8MM

## (undated) DEVICE — SUT PLEDGET PRE PUNCH 4.8 X 9.5 X 1.5 MM

## (undated) DEVICE — STRYKER PULSE LAVAGE WITH HIGH FLOW TIP

## (undated) DEVICE — STEALTH CLAMP INSERT FIBRA/FIBRA 60MM

## (undated) DEVICE — DRSG STOCKINETTE IMPERVIOUS XL 12 X 48"